# Patient Record
Sex: FEMALE | Race: WHITE | Employment: UNEMPLOYED | URBAN - METROPOLITAN AREA
[De-identification: names, ages, dates, MRNs, and addresses within clinical notes are randomized per-mention and may not be internally consistent; named-entity substitution may affect disease eponyms.]

---

## 2017-02-08 ENCOUNTER — TELEPHONE (OUTPATIENT)
Dept: ENDOCRINOLOGY | Age: 47
End: 2017-02-08

## 2017-02-08 NOTE — TELEPHONE ENCOUNTER
----- Message from Jen Burgos sent at 2/8/2017  2:38 PM EST -----  Regarding: RE: missed appointment  Contact: [phone]  Dr. Dimitri Hayes. Patient accepted the appointment for 02/9/2017 at 11:30am    Thank you. Annelise Leyva  ----- Message -----     From: Triston Ashley MD     Sent: 2/8/2017   2:17 PM       To: Jen Burgos  Subject: RE: missed appointment                           That's fine to put her in either of these spots. ----- Message -----     From: Jen Burgos     Sent: 2/8/2017   2:08 PM       To: Triston Ashley MD  Subject: missed appointment                               2/8/2017      Good afternoon Dr. Dimitri Hayes. Patient is calling in regards to missing  her January 24,2017 appointment due to the passing of her mother. She would like to   reschedule, some one has cancelled for tomorrow at 11:30am and  Friday 02/17/2017 at 2:10pm.  Please advise. Patient contact: [phone]    Thank you.   Annelise Leyva

## 2017-02-14 ENCOUNTER — TELEPHONE (OUTPATIENT)
Dept: ENDOCRINOLOGY | Age: 47
End: 2017-02-14

## 2017-02-14 NOTE — TELEPHONE ENCOUNTER
----- Message from Lennox Citizen sent at 2/14/2017 10:17 AM EST -----  Regarding: RE: Patient Cancelled  Spoke to [place] this morning and rescheduled her for 2/28/17 at 3:30 PM.  (you had an open slot.)   Thank you.      ----- Message -----     From: Vladimir Boggs MD     Sent: 2/9/2017  10:59 AM       To: Lennox Citizen  Subject: RE: Patient Jessica Rivera.  Does she want to reschedule for sometime in the next few weeks. Ok to put her on a Tuesday morning between 8:50-9:30.  ----- Message -----     From: Lennox Citieda     Sent: 2/9/2017  10:53 AM       To: Vladimir Boggs MD  Subject: Patient Brandon Banuelos just called to cancel her appointment. Said she didn't feel like seeing anyone today and she wants to just stay at home in her apartment. ROSIBEL. ............ Laura Bauman

## 2017-02-21 ENCOUNTER — HOSPITAL ENCOUNTER (OUTPATIENT)
Dept: LAB | Age: 47
Discharge: HOME | End: 2017-02-21

## 2017-02-21 PROCEDURE — 83036 HEMOGLOBIN GLYCOSYLATED A1C: CPT | Performed by: NURSE PRACTITIONER

## 2017-02-21 PROCEDURE — 80053 COMPREHEN METABOLIC PANEL: CPT | Performed by: NURSE PRACTITIONER

## 2017-02-21 PROCEDURE — 80061 LIPID PANEL: CPT | Performed by: NURSE PRACTITIONER

## 2017-02-22 LAB
ALBUMIN SERPL BCP-MCNC: 4 G/DL (ref 3.5–5)
ALBUMIN/GLOB SERPL: 1.2 {RATIO} (ref 1.1–2.2)
ALP SERPL-CCNC: 82 U/L (ref 45–117)
ALT SERPL-CCNC: 46 U/L (ref 12–78)
ANION GAP BLD CALC-SCNC: 11 MMOL/L (ref 5–15)
AST SERPL W P-5'-P-CCNC: 76 U/L (ref 15–37)
BILIRUB SERPL-MCNC: 0.7 MG/DL (ref 0.2–1)
BUN SERPL-MCNC: 29 MG/DL (ref 6–20)
BUN/CREAT SERPL: 17 (ref 12–20)
CALCIUM SERPL-MCNC: 9 MG/DL (ref 8.5–10.1)
CHLORIDE SERPL-SCNC: 89 MMOL/L (ref 97–108)
CHOLEST SERPL-MCNC: 154 MG/DL
CO2 SERPL-SCNC: 29 MMOL/L (ref 21–32)
CREAT SERPL-MCNC: 1.66 MG/DL (ref 0.55–1.02)
EST. AVERAGE GLUCOSE BLD GHB EST-MCNC: 223 MG/DL
GLOBULIN SER CALC-MCNC: 3.3 G/DL (ref 2–4)
GLUCOSE SERPL-MCNC: 346 MG/DL (ref 65–100)
HBA1C MFR BLD: 9.4 % (ref 4.2–6.3)
HDLC SERPL-MCNC: 66 MG/DL
HDLC SERPL: 2.3 {RATIO} (ref 0–5)
LDLC SERPL CALC-MCNC: 23.6 MG/DL (ref 0–100)
LIPID PROFILE,FLP: ABNORMAL
POTASSIUM SERPL-SCNC: 5.1 MMOL/L (ref 3.5–5.1)
PROT SERPL-MCNC: 7.3 G/DL (ref 6.4–8.2)
SODIUM SERPL-SCNC: 129 MMOL/L (ref 136–145)
TRIGL SERPL-MCNC: 322 MG/DL (ref ?–150)
VLDLC SERPL CALC-MCNC: 64.4 MG/DL

## 2017-02-28 ENCOUNTER — OFFICE VISIT (OUTPATIENT)
Dept: ENDOCRINOLOGY | Age: 47
End: 2017-02-28

## 2017-02-28 VITALS
HEART RATE: 106 BPM | HEIGHT: 65 IN | DIASTOLIC BLOOD PRESSURE: 85 MMHG | SYSTOLIC BLOOD PRESSURE: 130 MMHG | BODY MASS INDEX: 34.45 KG/M2 | WEIGHT: 206.8 LBS

## 2017-02-28 DIAGNOSIS — E78.5 HYPERLIPIDEMIA LDL GOAL <100: ICD-10-CM

## 2017-02-28 DIAGNOSIS — I10 ESSENTIAL HYPERTENSION, BENIGN: ICD-10-CM

## 2017-02-28 DIAGNOSIS — R79.89 ELEVATED LFTS: ICD-10-CM

## 2017-02-28 NOTE — PROGRESS NOTES
Chief Complaint   Patient presents with    Diabetes     pcp and pharmacy confirmed    Diabetic Foot Exam     due    Other     eye exam is due     History of Present Illness: Cheyanne Johnson is a 40 y.o. female here for follow up of diabetes. Weight down 2 lbs since last visit in 10/16. Currently taking 32 units of lantus in the morning and 6 units of lantus at night for the past month as she was having higher daytime sugars so the higher dose of lantus is helping. Did decrease the evening dose to avoid lows in the morning. May have a reading under 80 in the morning a few times a month. Dosing her novolog 1:8 for carbs at breakfast and lunch and 1:15 for dinner and often will dose after she eats especially after having a recent stomach flu and her stomach has been more unsettled. Has been taking 1:25 for correction for > 150 in the morning and this has worked better to bring her down when she is high. She recently had labs drawn last week that showed her BUN/Cr were elevated and she was called by [facility] and told she needed to see a renal specialist and she was very upset by this. She states she had taken some aleve prior to the lab draw and may have taken a double dose of lasix in the day or 2 before the lab draw as she was having more swelling and also having come off the stomach flu with vomiting and diarrhea, all of these may have contributed to dehydration leading to the abnormal kidney function. She is due to see Dr. Eneida Terrell on Thursday and I told her to have him repeat her labs now that she is feeling better to see if her kidney function has improved. She plans to drink plenty of water today and tomorrow prior to her lab draw to ensure she is not dehydrated. She has been under more stress recently as her mother  of cancer about 3 weeks ago so this has been very hard. She is still going to her counselor every 2 weeks and has found this helpful.   She can no longer get crestor through [facility] now that it's generic but has a 90 day supply of 40 mg tabs at home so I told her to take 1/2 tab daily to use these up over the next 6 months. Current Outpatient Prescriptions   Medication Sig    pantoprazole (PROTONIX) 40 mg tablet Take 40 mg by mouth daily.  venlafaxine-SR (EFFEXOR XR) 150 mg capsule Take  by mouth daily.  gabapentin (NEURONTIN) 300 mg capsule Take 600 mg by mouth three (3) times daily.  furosemide (LASIX) 40 mg tablet Take 1-2 tablets daily as needed for leg swelling    rosuvastatin (CRESTOR) 20 mg tablet Take 20 mg by mouth nightly.  olmesartan-hydrochlorothiazide (BENICAR HCT) 40-12.5 mg per tablet Take 1 Tab by mouth daily.  carvedilol (COREG CR) 10 mg CR capsule Take  by mouth daily (with breakfast).  insulin glargine (LANTUS SOLOSTAR) 100 unit/mL (3 mL) pen by SubCUTAneous route. 32 units before breakfast and 6 units at night    insulin aspart (NOVOLOG FLEXPEN) 100 unit/mL flexpen by SubCUTAneous route. 1:8 for carbs for breakfast and lunch and 1:15 for dinner and 1:50 > 150 for correction--Dose change 11/1/16--updated med list--did not send prescription to the pharmacy    estradiol (ESTRACE) 2 mg tablet Take  by mouth daily.  Insulin Syringe-Needle U-100 0.3 mL 31 x 5/16\" syrg Use up to 4 times daily     No current facility-administered medications for this visit. Allergies   Allergen Reactions    Zocor [Simvastatin] Myalgia    Lisinopril Cough     Review of Systems:  - Eyes: no blurry vision or double vision  - Cardiovascular: no chest pain  - Respiratory: no shortness of breath  - Musculoskeletal: no myalgias  - Neurological: no numbness/tingling in extremities    Physical Examination:  Blood pressure 130/85, pulse (!) 106, height 5' 5\" (1.651 m), weight 206 lb 12.8 oz (93.8 kg).   - General: pleasant, no distress, good eye contact aside from being tearful talking about the death of her mother  - Neck: no carotid bruits  - Cardiovascular: regular, normal rate, nl s1 and s2, no m/r/g,   - Respiratory: clear bilaterally  - Integumentary: no edema,   - Psychiatric: normal mood and affect    Data Reviewed:   Component      Latest Ref Rng & Units 2/21/2017 2/21/2017 2/21/2017          10:35 AM 10:35 AM 10:35 AM   Sodium      136 - 145 mmol/L   129 (L)   Potassium      3.5 - 5.1 mmol/L   5.1   Chloride      97 - 108 mmol/L   89 (L)   CO2      21 - 32 mmol/L   29   Anion gap      5 - 15 mmol/L   11   Glucose      65 - 100 mg/dL   346 (H)   BUN      6 - 20 MG/DL   29 (H)   Creatinine      0.55 - 1.02 MG/DL   1.66 (H)   BUN/Creatinine ratio      12 - 20     17   GFR est AA      >60 ml/min/1.73m2   40 (L)   GFR est non-AA      >60 ml/min/1.73m2   33 (L)   Calcium      8.5 - 10.1 MG/DL   9.0   Bilirubin, total      0.2 - 1.0 MG/DL   0.7   ALT      12 - 78 U/L   46   AST      15 - 37 U/L   76 (H)   Alk. phosphatase      45 - 117 U/L   82   Protein, total      6.4 - 8.2 g/dL   7.3   Albumin      3.5 - 5.0 g/dL   4.0   Globulin      2.0 - 4.0 g/dL   3.3   A-G Ratio      1.1 - 2.2     1.2   Cholesterol, total      <200 MG/DL  154    Triglyceride      <150 MG/DL  322 (H)    HDL Cholesterol      MG/DL  66    LDL, calculated      0 - 100 MG/DL  23.6    VLDL, calculated      MG/DL  64.4    CHOL/HDL Ratio      0 - 5.0    2.3    Hemoglobin A1c, (calculated)      4.2 - 6.3 % 9.4 (H)     Est. 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 As stated in all of my prior notes, she has been unable to work since 8/11/12 due to extreme anxiety leading to widely fluctuating blood sugars. Although previously she had not had any severe end organ damage from her diabetes, she has started to develop worsening neuropathy from her uncontrolled diabetes and this is affecting her fine motor skills in her hands. I still do not think she will be able to return to any full time employment in the future and stand by my decision to support her in pursuit of disability. I will be happy to continue to support the patient with any further appeal of her denial of disability. Overall I think her insulin doses are fine so will keep them the same for now. - cont lantus 32 units in the morning and cont 6 units at night  - cont Novolog 1:8 for carbs for breakfast and lunch and 1:15 for dinner and take 1:50 for correction at lunch and dinner but 1:25 at breakfast > 150  - check bs up to 8 times daily due to fluctuating blood sugars  - foot exam done 2/16  - microalbumin was 69 in 7/10 up to 392 in March 2011 and 621 in May down to 216 in October 2011 and 37.3 in 4/14 and up to 183 in 12/15 and 300 in 9/16  - optho UTD 12/15  - check A1c and cmp at [facility]    2) HTN NOS (401.9): Blood pressure was at goal < 140/90   - cont regimen above     3) Hyperlipidemia (272.4): Given DM, goal LDL is < 100, non-HDL < 130, and TGs < 150. LDL was 146 in January 2010, down to 124 in July and 93 in March 2011 with taking 5 mg of crestor daily. Her crestor was changed to pravastatin by Dr. Babak Joseph because of cost in Feb 2012. LDL 93 in 8/12 but her HDL was high at 138 due to ETOH intake. LDL down to 39 in 11/12 but up to 102 in 3/13. Off pravastatin at this time and previously was on 10 mg daily.  in 1/14. Up to 167 in 4/14 so started lovastatin 20 mg daily but she couldn't afford this.   She has been started on crestor 40 mg daily by [facility] and LDL 64 in 10/14 and 55 in 1/15. Was 37 in 3/16 so dose decreased to 20 mg daily and LDL 23 in 2/17  - cont crestor 20 mg daily (use up 1/2 of 40 mg tabs)  - check lipids at [facility]    4) Elevated LFTs (790.6): I told her previously that her LFTs were elevated likely due to ETOH intake so I advised her to cut back on this and her repeat LFTs were normal in 3/13 and 1/14 and 4/14 and 10/14 and 1/15 and 12/15 and 3/16. AST up to 76 in 2/17 so will follow this. Patient Instructions   1) It's possible your creatinine (kidney test) was elevated due to dehydration from recent stomach flu and also getting over bronchitis and having taken some doses of aleve and also possibly from doubling the lasix prior to the lab draw. Ask Dr. Joe Salter to repeat your test before sending you to a kidney specialist as your levels have been fine in the past.    2) Your Hemoglobin A1c (3 month test of blood sugar) was 9.4% up slightly from 9.1% in 11/16. 3) Use up the 40 mg crestor tabs taking 1/2 tab daily for the next 6 months and then we can determine another plan at that time. 4) Your blood pressure is at goal.        Follow-up Disposition:  Return in about 3 months (around 5/28/2017) for ok to use 9:10 or 9:30 on Tuesday morning.     Copy sent to:  Dr. Nicole Escobar at [facility]

## 2017-02-28 NOTE — PATIENT INSTRUCTIONS
1) It's possible your creatinine (kidney test) was elevated due to dehydration from recent stomach flu and also getting over bronchitis and having taken some doses of aleve and also possibly from doubling the lasix prior to the lab draw. Ask Dr. Sherry Woo to repeat your test before sending you to a kidney specialist as your levels have been fine in the past.    2) Your Hemoglobin A1c (3 month test of blood sugar) was 9.4% up slightly from 9.1% in 11/16. 3) Use up the 40 mg crestor tabs taking 1/2 tab daily for the next 6 months and then we can determine another plan at that time.     4) Your blood pressure is at goal.

## 2017-02-28 NOTE — MR AVS SNAPSHOT
Visit Information Date & Time Provider Department Dept. Phone Encounter #  
 2/28/2017  3:30 PM Reinaldo Pandya, [address] [place] [address] Diabetes and Endocrinology [phone] [id number] Follow-up Instructions Return in about 3 months (around 5/28/2017) for ok to use 9:10 or 9:30 on Tuesday morning. Upcoming Health Maintenance Date Due Pneumococcal 19-64 Medium Risk (1 of 1 - PPSV23) 3/5/1990 DTaP/Tdap/Td series (1 - Tdap) 3/5/1992 INFLUENZA AGE 9 TO ADULT 8/1/2016 EYE EXAM RETINAL OR DILATED Q1 12/9/2016 FOOT EXAM Q1 2/2/2017 HEMOGLOBIN A1C Q6M 8/21/2017 MICROALBUMIN Q1 9/19/2017 LIPID PANEL Q1 2/21/2018 PAP AKA CERVICAL CYTOLOGY 8/15/2019 Allergies as of 2/28/2017  Review Complete On: 2/28/2017 By: Reinaldo Pandya MD  
  
 Severity Noted Reaction Type Reactions Zocor [Simvastatin] Medium 11/20/2009   Intolerance Myalgia Lisinopril  05/26/2011    Cough Current Immunizations  Never Reviewed Name Date Influenza Vaccine Split 10/1/2012 Not reviewed this visit Vitals BP  
  
  
  
  
  
 130/85 Vitals History BMI and BSA Data Body Mass Index Body Surface Area 34.41 kg/m 2 2.07 m 2 Preferred Pharmacy Pharmacy Name Phone [facility] [address] [place] [address], [address] [place], [place] [phone] Your Updated Medication List  
  
   
This list is accurate as of: 2/28/17  4:11 PM.  Always use your most recent med list.  
  
  
  
  
 Stewart Papa HCT 40-12.5 mg per tablet Generic drug:  olmesartan-hydroCHLOROthiazide Take 1 Tab by mouth daily. COREG CR 10 mg CR capsule Generic drug:  carvedilol Take  by mouth daily (with breakfast). EFFEXOR  mg capsule Generic drug:  venlafaxine-SR Take  by mouth daily. estradiol 2 mg tablet Commonly known as:  ESTRACE Take  by mouth daily. furosemide 40 mg tablet Commonly known as:  LASIX Take 1-2 tablets daily as needed for leg swelling  
  
 gabapentin 300 mg capsule Commonly known as:  NEURONTIN Take 600 mg by mouth three (3) times daily. Insulin Syringe-Needle U-100 0.3 mL 31 gauge x 5/16 Syrg Use up to 4 times daily LANTUS SOLOSTAR 100 unit/mL (3 mL) pen Generic drug:  insulin glargine  
by SubCUTAneous route. 32 units before breakfast and 6 units at night NovoLOG Flexpen 100 unit/mL Inpn Generic drug:  insulin aspart  
by SubCUTAneous route. 1:8 for carbs for breakfast and lunch and 1:15 for dinner and 1:50 > 150 for correction--Dose change 11/1/16--updated med list--did not send prescription to the pharmacy PROTONIX 40 mg tablet Generic drug:  pantoprazole Take 40 mg by mouth daily. rosuvastatin 20 mg tablet Commonly known as:  CRESTOR Take 20 mg by mouth nightly. Follow-up Instructions Return in about 3 months (around 5/28/2017) for ok to use 9:10 or 9:30 on Tuesday morning. Patient Instructions 1) It's possible your creatinine (kidney test) was elevated due to dehydration from recent stomach flu and also getting over bronchitis and having taken some doses of aleve and also possibly from doubling the lasix prior to the lab draw. Ask Dr. Clifford Bravo to repeat your test before sending you to a kidney specialist as your levels have been fine in the past. 
 
2) Your Hemoglobin A1c (3 month test of blood sugar) was 9.4% up slightly from 9.1% in 11/16. 3) Use up the 40 mg crestor tabs taking 1/2 tab daily for the next 6 months and then we can determine another plan at that time. 4) Your blood pressure is at goal. 
 
 
 
  
Introducing \[facility]\"" & HEALTH SERVICES! Dear Carole Hackett: 
Thank you for requesting a [employer] account. Our records indicate that you already have an active [employer] account. You can access your account anytime at [email or web address] Did you know that you can access your hospital and ER discharge instructions at any time in Ansible? You can also review all of your test results from your hospital stay or ER visit. Additional Information If you have questions, please visit the Frequently Asked Questions section of the An[employer] website at [email or web address] Remember, Ansible is NOT to be used for urgent needs. For medical emergencies, dial 911. Now available from your iPhone and Android! Please provide this summary of care documentation to your next provider. Your primary care clinician is listed as Landon Medel. If you have any questions after today's visit, please call [phone].

## 2017-03-02 ENCOUNTER — HOSPITAL ENCOUNTER (OUTPATIENT)
Dept: LAB | Age: 47
Discharge: HOME | End: 2017-03-02

## 2017-03-02 LAB
ANION GAP BLD CALC-SCNC: 12 MMOL/L (ref 5–15)
BUN SERPL-MCNC: 19 MG/DL (ref 6–20)
BUN/CREAT SERPL: 12 (ref 12–20)
CALCIUM SERPL-MCNC: 9.2 MG/DL (ref 8.5–10.1)
CHLORIDE SERPL-SCNC: 96 MMOL/L (ref 97–108)
CO2 SERPL-SCNC: 25 MMOL/L (ref 21–32)
CREAT SERPL-MCNC: 1.6 MG/DL (ref 0.55–1.02)
GLUCOSE SERPL-MCNC: 77 MG/DL (ref 65–100)
POTASSIUM SERPL-SCNC: 4.6 MMOL/L (ref 3.5–5.1)
SODIUM SERPL-SCNC: 133 MMOL/L (ref 136–145)

## 2017-03-02 PROCEDURE — 80048 BASIC METABOLIC PNL TOTAL CA: CPT | Performed by: INTERNAL MEDICINE

## 2017-03-13 ENCOUNTER — TELEPHONE (OUTPATIENT)
Dept: ENDOCRINOLOGY | Age: 47
End: 2017-03-13

## 2017-03-13 NOTE — TELEPHONE ENCOUNTER
Patient stated that she had her labs done and she was told that she needs to see a kidney specialist.  She stated that at her last visit with Dr. Rebecca Welch  she was told to call back and let Dr. Rebecca Welch know what her labs showed. She stated that she would like for him to review her labs and give his opinion on what she should do.

## 2017-03-13 NOTE — TELEPHONE ENCOUNTER
----- Message from Mere Alonso sent at 3/13/2017  1:28 PM EDT -----  Regarding: Dr. Max Sebastian  Patient would like a call back at [phone].

## 2017-03-13 NOTE — TELEPHONE ENCOUNTER
Given her creatinine (kidney test) was still high at 1.6 and down only slightly from 1.66, I think it does make sense to see a kidney specialist just to make sure nothing else is going on as I was hopeful her level would have come back down with trying to drink more water but since it's still elevated, for safety, seeing the specialist makes sense at this time.

## 2017-04-06 ENCOUNTER — TELEPHONE (OUTPATIENT)
Dept: ENDOCRINOLOGY | Age: 47
End: 2017-04-06

## 2017-04-06 NOTE — TELEPHONE ENCOUNTER
----- Message from Blanka Coates sent at 4/6/2017  8:32 AM EDT -----  Regarding: Dr. Selene Bruno (P) [phone], advised that pt was admitted into [facility] ICU on 04/05/17 for DKA, pneumonia and other infections. Doctor at hospital stated that she was currently in critical care.

## 2017-04-06 NOTE — TELEPHONE ENCOUNTER
Please call Kye Tavarez and let him know that I appreciate the update and if Jorge Luis Gathers needs anything from me during her hospital stay, please have her or the doctor caring for her call to speak with me.

## 2017-04-12 ENCOUNTER — TELEPHONE (OUTPATIENT)
Dept: ENDOCRINOLOGY | Age: 47
End: 2017-04-12

## 2017-04-12 NOTE — TELEPHONE ENCOUNTER
Claudell Hay called to give an update. He stated that she is still in critical care and still on the ventilator.   (

## 2017-04-25 ENCOUNTER — TELEPHONE (OUTPATIENT)
Dept: ENDOCRINOLOGY | Age: 47
End: 2017-04-25

## 2017-04-25 NOTE — TELEPHONE ENCOUNTER
----- Message from González Rosa MD sent at 4/25/2017  8:53 AM EDT -----  Can you call Michelle Washburn and find out how she is doing?

## 2017-04-25 NOTE — TELEPHONE ENCOUNTER
i spoke with Claudell Hay and he stated that Ms. Arturo Rivera is still in ICU and she is still currently on the ventilator.

## 2017-05-08 ENCOUNTER — TELEPHONE (OUTPATIENT)
Dept: ENDOCRINOLOGY | Age: 47
End: 2017-05-08

## 2017-05-08 NOTE — TELEPHONE ENCOUNTER
----- Message from Tony Jackson sent at 5/8/2017 11:17 AM EDT -----  Regarding: Schedule Question ? Patient called this morning from home (sounding great.)   She already is scheduled to see you on 5/30/17 at 9:10 AM and wants to know if you need to see her sooner than that? Please advise and I will call her. Thank you.

## 2017-05-17 ENCOUNTER — HOSPITAL ENCOUNTER (OUTPATIENT)
Dept: LAB | Age: 47
Discharge: HOME | End: 2017-05-17

## 2017-05-17 LAB
ANION GAP BLD CALC-SCNC: 13 MMOL/L (ref 5–15)
BASOPHILS # BLD AUTO: 0.1 K/UL (ref 0–0.1)
BASOPHILS # BLD: 1 % (ref 0–1)
BUN SERPL-MCNC: 20 MG/DL (ref 6–20)
BUN/CREAT SERPL: 13 (ref 12–20)
CALCIUM SERPL-MCNC: 9.9 MG/DL (ref 8.5–10.1)
CHLORIDE SERPL-SCNC: 92 MMOL/L (ref 97–108)
CO2 SERPL-SCNC: 23 MMOL/L (ref 21–32)
CREAT SERPL-MCNC: 1.49 MG/DL (ref 0.55–1.02)
EOSINOPHIL # BLD: 0.6 K/UL (ref 0–0.4)
EOSINOPHIL NFR BLD: 4 % (ref 0–7)
ERYTHROCYTE [DISTWIDTH] IN BLOOD BY AUTOMATED COUNT: 14.3 % (ref 11.5–14.5)
GLUCOSE SERPL-MCNC: 76 MG/DL (ref 65–100)
HCT VFR BLD AUTO: 35.4 % (ref 35–47)
HGB BLD-MCNC: 11.4 G/DL (ref 11.5–16)
LYMPHOCYTES # BLD AUTO: 35 % (ref 12–49)
LYMPHOCYTES # BLD: 5.2 K/UL (ref 0.8–3.5)
MCH RBC QN AUTO: 31.6 PG (ref 26–34)
MCHC RBC AUTO-ENTMCNC: 32.2 G/DL (ref 30–36.5)
MCV RBC AUTO: 98.1 FL (ref 80–99)
MONOCYTES # BLD: 0.9 K/UL (ref 0–1)
MONOCYTES NFR BLD AUTO: 6 % (ref 5–13)
NEUTS SEG # BLD: 7.8 K/UL (ref 1.8–8)
NEUTS SEG NFR BLD AUTO: 54 % (ref 32–75)
PLATELET # BLD AUTO: 591 K/UL (ref 150–400)
POTASSIUM SERPL-SCNC: 4.9 MMOL/L (ref 3.5–5.1)
RBC # BLD AUTO: 3.61 M/UL (ref 3.8–5.2)
SODIUM SERPL-SCNC: 128 MMOL/L (ref 136–145)
WBC # BLD AUTO: 14.6 K/UL (ref 3.6–11)

## 2017-05-17 PROCEDURE — 80048 BASIC METABOLIC PNL TOTAL CA: CPT | Performed by: INTERNAL MEDICINE

## 2017-05-17 PROCEDURE — 85025 COMPLETE CBC W/AUTO DIFF WBC: CPT | Performed by: INTERNAL MEDICINE

## 2017-05-22 ENCOUNTER — HOSPITAL ENCOUNTER (OUTPATIENT)
Dept: LAB | Age: 47
Discharge: HOME | End: 2017-05-22

## 2017-05-22 LAB
ALBUMIN SERPL BCP-MCNC: 3.7 G/DL (ref 3.5–5)
ALBUMIN/GLOB SERPL: 1.1 {RATIO} (ref 1.1–2.2)
ALP SERPL-CCNC: 87 U/L (ref 45–117)
ALT SERPL-CCNC: 20 U/L (ref 12–78)
ANION GAP BLD CALC-SCNC: 5 MMOL/L (ref 5–15)
AST SERPL W P-5'-P-CCNC: 15 U/L (ref 15–37)
BASOPHILS # BLD AUTO: 0 K/UL (ref 0–0.1)
BASOPHILS # BLD: 0 % (ref 0–1)
BILIRUB SERPL-MCNC: 0.2 MG/DL (ref 0.2–1)
BUN SERPL-MCNC: 10 MG/DL (ref 6–20)
BUN/CREAT SERPL: 11 (ref 12–20)
CALCIUM SERPL-MCNC: 9.6 MG/DL (ref 8.5–10.1)
CHLORIDE SERPL-SCNC: 100 MMOL/L (ref 97–108)
CO2 SERPL-SCNC: 26 MMOL/L (ref 21–32)
CREAT SERPL-MCNC: 0.89 MG/DL (ref 0.55–1.02)
EOSINOPHIL # BLD: 0.5 K/UL (ref 0–0.4)
EOSINOPHIL NFR BLD: 4 % (ref 0–7)
ERYTHROCYTE [DISTWIDTH] IN BLOOD BY AUTOMATED COUNT: 13.9 % (ref 11.5–14.5)
GLOBULIN SER CALC-MCNC: 3.4 G/DL (ref 2–4)
GLUCOSE SERPL-MCNC: 154 MG/DL (ref 65–100)
HCT VFR BLD AUTO: 32.5 % (ref 35–47)
HGB BLD-MCNC: 10.5 G/DL (ref 11.5–16)
LYMPHOCYTES # BLD AUTO: 42 % (ref 12–49)
LYMPHOCYTES # BLD: 5 K/UL (ref 0.8–3.5)
MCH RBC QN AUTO: 31.7 PG (ref 26–34)
MCHC RBC AUTO-ENTMCNC: 32.3 G/DL (ref 30–36.5)
MCV RBC AUTO: 98.2 FL (ref 80–99)
MONOCYTES # BLD: 0.9 K/UL (ref 0–1)
MONOCYTES NFR BLD AUTO: 8 % (ref 5–13)
NEUTS SEG # BLD: 5.6 K/UL (ref 1.8–8)
NEUTS SEG NFR BLD AUTO: 46 % (ref 32–75)
PLATELET # BLD AUTO: 508 K/UL (ref 150–400)
POTASSIUM SERPL-SCNC: 5 MMOL/L (ref 3.5–5.1)
PROT SERPL-MCNC: 7.1 G/DL (ref 6.4–8.2)
RBC # BLD AUTO: 3.31 M/UL (ref 3.8–5.2)
SODIUM SERPL-SCNC: 131 MMOL/L (ref 136–145)
WBC # BLD AUTO: 11.9 K/UL (ref 3.6–11)

## 2017-05-22 PROCEDURE — 80053 COMPREHEN METABOLIC PANEL: CPT | Performed by: NURSE PRACTITIONER

## 2017-05-22 PROCEDURE — 85025 COMPLETE CBC W/AUTO DIFF WBC: CPT | Performed by: NURSE PRACTITIONER

## 2017-05-24 ENCOUNTER — HOSPITAL ENCOUNTER (OUTPATIENT)
Dept: LAB | Age: 47
Discharge: HOME | End: 2017-05-24

## 2017-05-24 PROCEDURE — 87086 URINE CULTURE/COLONY COUNT: CPT | Performed by: NURSE PRACTITIONER

## 2017-05-26 LAB
BACTERIA SPEC CULT: NORMAL
CC UR VC: NORMAL
SERVICE CMNT-IMP: NORMAL

## 2017-05-30 ENCOUNTER — OFFICE VISIT (OUTPATIENT)
Dept: ENDOCRINOLOGY | Age: 47
End: 2017-05-30

## 2017-05-30 VITALS
WEIGHT: 189.1 LBS | HEIGHT: 65 IN | DIASTOLIC BLOOD PRESSURE: 82 MMHG | HEART RATE: 88 BPM | BODY MASS INDEX: 31.5 KG/M2 | SYSTOLIC BLOOD PRESSURE: 150 MMHG

## 2017-05-30 DIAGNOSIS — R79.89 ELEVATED LFTS: ICD-10-CM

## 2017-05-30 DIAGNOSIS — E78.5 HYPERLIPIDEMIA LDL GOAL <100: ICD-10-CM

## 2017-05-30 DIAGNOSIS — I10 ESSENTIAL HYPERTENSION, BENIGN: ICD-10-CM

## 2017-05-30 RX ORDER — METOCLOPRAMIDE 5 MG/1
5 TABLET ORAL
COMMUNITY
End: 2017-09-28

## 2017-05-30 NOTE — PATIENT INSTRUCTIONS
1) Plan on staying at 28 units of lantus and as long as your sugars are staying between  without eating, this is a good dose but if you are starting to rise over 150 without eating, then go up by 2 units every 3-4 days as needed to find the dose that keeps you between  without food. If you are staying over 140 consistently in the morning, then add back 4 units at night. 2) Your blood pressure was up today but you are going to have the PEG tube replaced today. Start monitoring blood pressure about 2-3 times per week at alternating times either in the morning or evening after resting for 5 minutes and sitting upright in a chair with your arm at heart level. Please let Dr. Dmitri Parrish if you are having readings over 140 on the top number or 90 on the bottom number just on the coreg alone as we may need to add back a low dose of benicar.

## 2017-05-30 NOTE — MR AVS SNAPSHOT
Visit Information Date & Time Provider Department Dept. Phone Encounter #  
 5/30/2017  9:10 AM Savanah Wright, [address] [place] [address] Diabetes and Endocrinology [phone] Follow-up Instructions Return for 3 months on a Tuesday morning at 9:10 or 9:30. Upcoming Health Maintenance Date Due Pneumococcal 19-64 Medium Risk (1 of 1 - PPSV23) 3/5/1990 DTaP/Tdap/Td series (1 - Tdap) 3/5/1992 EYE EXAM RETINAL OR DILATED Q1 12/9/2016 FOOT EXAM Q1 2/2/2017 INFLUENZA AGE 9 TO ADULT 8/1/2017 HEMOGLOBIN A1C Q6M 8/21/2017 MICROALBUMIN Q1 9/19/2017 LIPID PANEL Q1 2/21/2018 PAP AKA CERVICAL CYTOLOGY 8/15/2019 Allergies as of 5/30/2017  Review Complete On: 5/30/2017 By: Savanah Wright MD  
  
 Severity Noted Reaction Type Reactions Zocor [Simvastatin] Medium 11/20/2009   Intolerance Myalgia Lisinopril  05/26/2011    Cough Current Immunizations  Never Reviewed Name Date Influenza Vaccine Split 10/1/2012 Not reviewed this visit Vitals BP Pulse Height(growth percentile) Weight(growth percentile) BMI OB Status 150/82 88 5' 5\" (1.651 m) 189 lb 1.6 oz (85.8 kg) 31.47 kg/m2 Having regular periods Smoking Status Current Every Day Smoker Vitals History BMI and BSA Data Body Mass Index Body Surface Area  
 31.47 kg/m 2 1.98 m 2 Preferred Pharmacy Pharmacy Name Phone [address], [address] [place] [address] [phone] Your Updated Medication List  
  
   
This list is accurate as of: 5/30/17  9:58 AM.  Always use your most recent med list.  
  
  
  
  
 COREG CR 10 mg CR capsule Generic drug:  carvedilol Take  by mouth daily (with breakfast). EFFEXOR  mg capsule Generic drug:  venlafaxine-SR Take  by mouth daily. estradiol 2 mg tablet Commonly known as:  ESTRACE Take  by mouth daily. gabapentin 300 mg capsule Commonly known as:  NEURONTIN Take 600 mg by mouth three (3) times daily. Insulin Syringe-Needle U-100 0.3 mL 31 gauge x 5/16 Syrg Use up to 4 times daily LANTUS SOLOSTAR 100 unit/mL (3 mL) Inpn Generic drug:  insulin glargine  
by SubCUTAneous route. 28 units before breakfast and occ 4 units at night NovoLOG Flexpen 100 unit/mL Inpn Generic drug:  insulin aspart  
by SubCUTAneous route. 1:15 for carbs and 1:50 > 150 for correction--Dose change 5/30/17--updated med list--did not send prescription to the pharmacy PROTONIX 40 mg tablet Generic drug:  pantoprazole Take 40 mg by mouth daily. REGLAN 5 mg tablet Generic drug:  metoclopramide HCl Take 5 mg by mouth Before breakfast, lunch, and dinner. rosuvastatin 20 mg tablet Commonly known as:  CRESTOR Take 20 mg by mouth nightly. Follow-up Instructions Return for 3 months on a Tuesday morning at 9:10 or 9:30. Patient Instructions 1) Plan on staying at 28 units of lantus and as long as your sugars are staying between  without eating, this is a good dose but if you are starting to rise over 150 without eating, then go up by 2 units every 3-4 days as needed to find the dose that keeps you between  without food. If you are staying over 140 consistently in the morning, then add back 4 units at night. 2) Your blood pressure was up today but you are going to have the PEG tube replaced today. Start monitoring blood pressure about 2-3 times per week at alternating times either in the morning or evening after resting for 5 minutes and sitting upright in a chair with your arm at heart level. Please let Dr. Milagros Hebert if you are having readings over 140 on the top number or 90 on the bottom number just on the coreg alone as we may need to add back a low dose of benicar. Introducing [facility] & HEALTH SERVICES! Dear Buffy Hurtado: Thank you for requesting a [email or web address] account. Our records indicate that you already have an active [email or web address] account. You can access your account anytime at [email or web address] Did you know that you can access your hospital and ER discharge instructions at any time in [email or web address]? You can also review all of your test results from your hospital stay or ER visit. Additional Information If you have questions, please visit the Frequently Asked Questions section of the [email or web address] website at [email or web address] Remember, [email or web address] is NOT to be used for urgent needs. For medical emergencies, dial 911. Now available from your iPhone and Android! Please provide this summary of care documentation to your next provider. Your primary care clinician is listed as Jef Foster. If you have any questions after today's visit, please call [phone].

## 2017-05-30 NOTE — PROGRESS NOTES
Chief Complaint   Patient presents with    Diabetes     pcp and pharmacy confirmed    Other     consnet form signed to obtain eye report     History of Present Illness: Alley Carroll is a 56 y.o. female here for follow up of diabetes. Weight down 17 lbs since last visit in 2/17. She was admitted to [facility] in the ICU on 4/5/17 for DKA and was very ill for a month and had a PEG tube placed and a trach placed but 2 days after the trach was placed, was able to have this removed and was discharged on 5/4/17. Still has the PEG tube in place but plan is to have this removed today by Dr. Navya Ramos. Was told she had a pocket of fluid in her lungs that became infected and was treated with antibiotics. BP became very low and was taken off lasix and the benicar/hctz. Her BP has been controlled on the coreg alone since discharge but was up today as she is nervous about having the PEG tube removed. Had decreased her lantus down to 20 units in the morning but her sugars started rising and 3 days ago went up to 28 units in the morning only. Occ will take 4 units of lantus at night if her sugar was over 200 in the morning. Has been dosing novolog 1:15 for carbs and sugars are mostly staying under 150. Has still been taking 1/2 tab of crestor daily. Will be following up with a lung and kidney doctor. Has also had more trouble with vaginal yeast infection and this if finally starting to resolve with fluconazole. Has had more trouble with memory after the hospital stay and was told some of this may be permanent due to her prolonged ICU course. Current Outpatient Prescriptions   Medication Sig    metoclopramide HCl (REGLAN) 5 mg tablet Take 5 mg by mouth Before breakfast, lunch, and dinner.  pantoprazole (PROTONIX) 40 mg tablet Take 40 mg by mouth daily.  venlafaxine-SR (EFFEXOR XR) 150 mg capsule Take  by mouth daily.     gabapentin (NEURONTIN) 300 mg capsule Take 600 mg by mouth three (3) times daily.    rosuvastatin (CRESTOR) 20 mg tablet Take 20 mg by mouth nightly.  carvedilol (COREG CR) 10 mg CR capsule Take  by mouth daily (with breakfast).  insulin glargine (LANTUS SOLOSTAR) 100 unit/mL (3 mL) pen by SubCUTAneous route. 28 units before breakfast and occ 4 units at night    insulin aspart (NOVOLOG FLEXPEN) 100 unit/mL flexpen by SubCUTAneous route. 1:15 for carbs and 1:50 > 150 for correction--Dose change 5/30/17--updated med list--did not send prescription to the pharmacy    estradiol (ESTRACE) 2 mg tablet Take  by mouth daily.  Insulin Syringe-Needle U-100 0.3 mL 31 x 5/16\" syrg Use up to 4 times daily     No current facility-administered medications for this visit. Allergies   Allergen Reactions    Zocor [Simvastatin] Myalgia    Lisinopril Cough     Review of Systems:  - Eyes: no blurry vision or double vision  - Cardiovascular: no chest pain  - Respiratory: no shortness of breath  - Musculoskeletal: no myalgias  - Neurological: no numbness/tingling in extremities    Physical Examination:  Blood pressure 150/82, pulse 88, height 5' 5\" (1.651 m), weight 189 lb 1.6 oz (85.8 kg).   - General: pleasant, no distress, good eye contact   - Neck: no carotid bruits  - Cardiovascular: regular, normal rate, nl s1 and s2, no m/r/g,   - Respiratory: clear bilaterally  - Integumentary: no edema,   - Psychiatric: normal mood and affect    Data Reviewed:   Component      Latest Ref Rng & Units 5/22/2017 5/17/2017           1:59 PM 10:09 PM   Sodium      136 - 145 mmol/L 131 (L) 128 (L)   Potassium      3.5 - 5.1 mmol/L 5.0 4.9   Chloride      97 - 108 mmol/L 100 92 (L)   CO2      21 - 32 mmol/L 26 23   Anion gap      5 - 15 mmol/L 5 13   Glucose      65 - 100 mg/dL 154 (H) 76   BUN      6 - 20 MG/DL 10 20   Creatinine      0.55 - 1.02 MG/DL 0.89 1.49 (H)   BUN/Creatinine ratio      12 - 20   11 (L) 13   GFR est AA      >60 ml/min/1.73m2 >60 46 (L)   GFR est non-AA      >60 ml/min/1.73m2 >60 38 (L)   Calcium      8.5 - 10.1 MG/DL 9.6 9.9   Bilirubin, total      0.2 - 1.0 MG/DL 0.2    ALT      12 - 78 U/L 20    AST      15 - 37 U/L 15    Alk. 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 As stated in all of my prior notes, she has been unable to work since 8/11/12 due to extreme anxiety leading to widely fluctuating blood sugars. Although previously she had not had any severe end organ damage from her diabetes, she has started to develop worsening neuropathy from her uncontrolled diabetes and this is affecting her fine motor skills in her hands. I still do not think she will be able to return to any full time employment in the future and stand by my decision to support her in pursuit of disability. I will be happy to continue to support the patient with any further appeal of her denial of disability. Overall I think her insulin doses are fine so will keep them the same for now but gave a titration plan as below.   - cont lantus 28 units in the morning   - cont Novolog 1:15 for carbs and take 1:50 for correction at lunch and dinner but 1:25 at breakfast > 150  - check bs up to 8 times daily due to fluctuating blood sugars  - foot exam done 2/16  - microalbumin was 69 in 7/10 up to 392 in March 2011 and 621 in May down to 216 in October 2011 and 37.3 in 4/14 and up to 183 in 12/15 and 300 in 9/16  - optho UTD 12/15  - check A1c and cmp at [facility]    2) HTN NOS (401.9): Blood pressure was above goal < 140/90 but was anxious about PEG tube removal.  Currently off lasix and benicar/hctz  - cont regimen above     3) Hyperlipidemia (272.4): Given DM, goal LDL is < 100, non-HDL < 130, and TGs < 150. LDL was 146 in January 2010, down to 124 in July and 93 in March 2011 with taking 5 mg of crestor daily. Her crestor was changed to pravastatin by Dr. Michael Chowdary because of cost in Feb 2012. LDL 93 in 8/12 but her HDL was high at 138 due to ETOH intake. LDL down to 39 in 11/12 but up to 102 in 3/13. Off pravastatin at this time and previously was on 10 mg daily.  in 1/14. Up to 167 in 4/14 so started lovastatin 20 mg daily but she couldn't afford this. She has been started on crestor 40 mg daily by [facility] and LDL 64 in 10/14 and 55 in 1/15. Was 37 in 3/16 so dose decreased to 20 mg daily and LDL 23 in 2/17  - cont crestor 20 mg daily (use up 1/2 of 40 mg tabs)  - check lipids at [facility]    4) Elevated LFTs (790.6): I told her previously that her LFTs were elevated likely due to ETOH intake so I advised her to cut back on this and her repeat LFTs were normal in 3/13 and 1/14 and 4/14 and 10/14 and 1/15 and 12/15 and 3/16. AST up to 76 in 2/17 but back to normal in 5/17 so will follow this. Patient Instructions   1) Plan on staying at 28 units of lantus and as long as your sugars are staying between  without eating, this is a good dose but if you are starting to rise over 150 without eating, then go up by 2 units every 3-4 days as needed to find the dose that keeps you between  without food. If you are staying over 140 consistently in the morning, then add back 4 units at night. 2) Your blood pressure was up today but you are going to have the PEG tube replaced today.   Start monitoring blood pressure about 2-3 times per week at alternating times either in the morning or evening after resting for 5 minutes and sitting upright in a chair with your arm at heart level. Please let Dr. Taina Griffin if you are having readings over 140 on the top number or 90 on the bottom number just on the coreg alone as we may need to add back a low dose of benicar. Follow-up Disposition:  Return for 3 months on a Tuesday morning at 9:10 or 9:30.     Copy sent to:  Dr. Clemente Rajan at [facility]  Dr. Nell Kitchen

## 2017-06-12 ENCOUNTER — HOSPITAL ENCOUNTER (OUTPATIENT)
Dept: LAB | Age: 47
Discharge: HOME | End: 2017-06-12

## 2017-06-12 PROCEDURE — 80048 BASIC METABOLIC PNL TOTAL CA: CPT | Performed by: INTERNAL MEDICINE

## 2017-06-13 LAB
ANION GAP BLD CALC-SCNC: 9 MMOL/L (ref 5–15)
BUN SERPL-MCNC: 7 MG/DL (ref 6–20)
BUN/CREAT SERPL: 7 (ref 12–20)
CALCIUM SERPL-MCNC: 8.8 MG/DL (ref 8.5–10.1)
CHLORIDE SERPL-SCNC: 89 MMOL/L (ref 97–108)
CO2 SERPL-SCNC: 28 MMOL/L (ref 21–32)
CREAT SERPL-MCNC: 0.97 MG/DL (ref 0.55–1.02)
GLUCOSE SERPL-MCNC: 369 MG/DL (ref 65–100)
POTASSIUM SERPL-SCNC: 5.3 MMOL/L (ref 3.5–5.1)
SODIUM SERPL-SCNC: 126 MMOL/L (ref 136–145)

## 2017-06-14 ENCOUNTER — TELEPHONE (OUTPATIENT)
Dept: ENDOCRINOLOGY | Age: 47
End: 2017-06-14

## 2017-06-14 NOTE — TELEPHONE ENCOUNTER
Please call her and ask if this letter is to be addressed to the same person as before and if she needs this faxed or if she plans to pick this up.

## 2017-06-14 NOTE — TELEPHONE ENCOUNTER
I received a call on Rekha's desk phone from patient. She stated she needs a letter written to get her food stamps back. She stated Dr. Tomasa Lu writes this letter every 6 months for her. She needs this written ASAP because she is out of food stamps. She can be reached at [phone].

## 2017-06-14 NOTE — TELEPHONE ENCOUNTER
Patient stated the letter goes to the same plance. Patient stated that  she will  the letter and she would like it to be to the attention of :    Mr. Ap Negron

## 2017-07-14 RX ORDER — FUROSEMIDE 20 MG/1
TABLET ORAL DAILY
Status: ON HOLD | COMMUNITY
End: 2017-11-26

## 2017-07-14 RX ORDER — ATORVASTATIN CALCIUM 80 MG/1
40 TABLET, FILM COATED ORAL DAILY
COMMUNITY
End: 2017-09-28 | Stop reason: SDUPTHER

## 2017-08-07 NOTE — TELEPHONE ENCOUNTER
Patient is requesting a refill on her test strips and she also needs a new meter due to hers breaking. She uses [email or web address] [phone].

## 2017-08-08 RX ORDER — BLOOD SUGAR DIAGNOSTIC
STRIP MISCELLANEOUS
Qty: 250 STRIP | Refills: 11 | Status: SHIPPED | OUTPATIENT
Start: 2017-08-08 | End: 2017-11-20

## 2017-08-08 RX ORDER — BLOOD-GLUCOSE METER
EACH MISCELLANEOUS
Qty: 1 EACH | Refills: 0 | Status: SHIPPED | OUTPATIENT
Start: 2017-08-08 | End: 2017-11-20

## 2017-08-08 NOTE — TELEPHONE ENCOUNTER
Pt now has medicaid. She stated that she was told that medicaid will cover  Accu chek.   Patient was informed that it will be sent to [facility] per her request.

## 2017-09-08 RX ORDER — CARVEDILOL PHOSPHATE 10 MG/1
10 CAPSULE, EXTENDED RELEASE ORAL
Qty: 30 CAP | Refills: 11 | Status: SHIPPED | OUTPATIENT
Start: 2017-09-08 | End: 2017-09-28 | Stop reason: ALTCHOICE

## 2017-09-28 ENCOUNTER — OFFICE VISIT (OUTPATIENT)
Dept: ENDOCRINOLOGY | Age: 47
End: 2017-09-28

## 2017-09-28 VITALS
DIASTOLIC BLOOD PRESSURE: 96 MMHG | HEIGHT: 65 IN | BODY MASS INDEX: 30.17 KG/M2 | HEART RATE: 110 BPM | WEIGHT: 181.1 LBS | SYSTOLIC BLOOD PRESSURE: 167 MMHG

## 2017-09-28 DIAGNOSIS — R79.89 ELEVATED LFTS: ICD-10-CM

## 2017-09-28 DIAGNOSIS — E78.5 HYPERLIPIDEMIA LDL GOAL <100: ICD-10-CM

## 2017-09-28 DIAGNOSIS — I10 ESSENTIAL HYPERTENSION, BENIGN: ICD-10-CM

## 2017-09-28 RX ORDER — ATORVASTATIN CALCIUM 40 MG/1
40 TABLET, FILM COATED ORAL DAILY
Qty: 30 TAB | Refills: 11 | Status: SHIPPED | OUTPATIENT
Start: 2017-09-28 | End: 2017-11-26

## 2017-09-28 RX ORDER — CARVEDILOL 12.5 MG/1
12.5 TABLET ORAL 2 TIMES DAILY WITH MEALS
Qty: 60 TAB | Refills: 11 | Status: SHIPPED | OUTPATIENT
Start: 2017-09-28 | End: 2017-11-02 | Stop reason: SDUPTHER

## 2017-09-28 RX ORDER — INSULIN GLARGINE 100 [IU]/ML
INJECTION, SOLUTION SUBCUTANEOUS
Qty: 15 ML | Refills: 11 | Status: ON HOLD | OUTPATIENT
Start: 2017-09-28 | End: 2017-11-26

## 2017-09-28 RX ORDER — GABAPENTIN 300 MG/1
600 CAPSULE ORAL 3 TIMES DAILY
Qty: 180 CAP | Refills: 11 | Status: SHIPPED | OUTPATIENT
Start: 2017-09-28 | End: 2017-11-26

## 2017-09-28 RX ORDER — INSULIN ASPART 100 [IU]/ML
INJECTION, SOLUTION INTRAVENOUS; SUBCUTANEOUS
Qty: 15 ML | Refills: 11 | Status: SHIPPED | OUTPATIENT
Start: 2017-09-28 | End: 2017-12-12 | Stop reason: CLARIF

## 2017-09-28 NOTE — PATIENT INSTRUCTIONS
1) We will use up the extended release coreg by taking one tab with breakfast and dinner and then change to immediate release coreg 12.5 mg twice daily. 2) Decrease the lantus to 18 units in the morning and see if this keeps your morning sugars between . If still dropping overnight more than 2 times in a week, then further decrease to 16 units in the morning. 3) Stay on the same novolog scale for now. 4) Use up the 1/2 tab of 80 mg of lipitor and I sent 40 mg tabs to the pharmacy. 5) I will send you a message through [employer] with your lab results.

## 2017-09-28 NOTE — PROGRESS NOTES
Chief Complaint   Patient presents with    Diabetes     pcp and pharmacy confirmed    Diabetic Foot Exam     due    Other     consent form signed to obtain eye report     History of Present Illness: Alfredo Garcia is a 56 y.o. female here for follow up of diabetes. Weight down 8 lbs since last visit in 5/17. Did have her PEG tube removed in May and hasn't need to use the reglan. Was approved for Medicaid in August and has been using accu-chek for strips. Has been taking 20 units of lantus every morning and dosing novolog as below. Had been taking 28 units of lantus but was having low sugars and has cut all the way down to 20 units as of a week ago but despite this still has evidence of sugars dropping by  points overnight without any novolog and often has been eating a small meal at bedtime to prevent low sugars. Her insurance will not be covering the coreg CR so we'll switch to IR coreg. Will be going back to Dr. Wendy Chung for PCP now that she has insurance. Current Outpatient Prescriptions   Medication Sig    carvedilol (COREG CR) 10 mg CR capsule Take 1 Cap by mouth daily (with breakfast).  ACCU-CHEK NORMA PLUS METER misc Use as directed to test up to 8 times per day. Dx E10.65    ACCU-CHEK NORMA PLUS TEST STRP strip Use as directed to test up to 8 times per day. Dx E10.65    atorvastatin (LIPITOR) 80 mg tablet Take 40 mg by mouth daily.  furosemide (LASIX) 20 mg tablet Take  by mouth daily.  pantoprazole (PROTONIX) 40 mg tablet Take 40 mg by mouth daily.  venlafaxine-SR (EFFEXOR XR) 150 mg capsule Take  by mouth daily.  gabapentin (NEURONTIN) 300 mg capsule Take 600 mg by mouth three (3) times daily.  insulin glargine (LANTUS SOLOSTAR) 100 unit/mL (3 mL) pen by SubCUTAneous route. 20 units before breakfast and occ 4 units at night    insulin aspart (NOVOLOG FLEXPEN) 100 unit/mL flexpen by SubCUTAneous route.  1:15 for carbs and 1:50 > 150 for correction--Dose change 5/30/17--updated med list--did not send prescription to the pharmacy    estradiol (ESTRACE) 2 mg tablet Take  by mouth daily.  Insulin Syringe-Needle U-100 0.3 mL 31 x 5/16\" syrg Use up to 4 times daily     No current facility-administered medications for this visit. Allergies   Allergen Reactions    Zocor [Simvastatin] Myalgia    Lisinopril Cough     Review of Systems:  - Eyes: no blurry vision or double vision  - Cardiovascular: no chest pain  - Respiratory: no shortness of breath  - Musculoskeletal: no myalgias  - Neurological: no numbness/tingling in extremities    Physical Examination:  Blood pressure (!) 167/96, pulse (!) 110, height 5' 5\" (1.651 m), weight 181 lb 1.6 oz (82.1 kg). - General: pleasant, no distress, good eye contact   - Neck: no carotid bruits  - Cardiovascular: regular, normal rate, nl s1 and s2, no m/r/g,   - Respiratory: clear bilaterally  - Integumentary: no edema,   - Psychiatric: normal mood and affect         Diabetic foot exam performed by Micah Akhtar MD     Measurement  Response Nurse Comment Physician Comment   Monofilament  R - reduced sensation with micro filament  L - reduced sensation with micro filament     Pulse DP R - 2+ (normal)  L - 2+ (normal)     Pulse TP R - normal  L - decreased     Structural deformity R - None  L - None     Skin Integrity / Deformity R - Mild - callus  L - Mild - callus        Reviewed by:                 Data Reviewed:   - none new for review    Assessment/Plan:   1) 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 As stated in all of my prior notes, she has been unable to work since 8/11/12 due to extreme anxiety leading to widely fluctuating blood sugars. Although previously she had not had any severe end organ damage from her diabetes, she has started to develop worsening neuropathy from her uncontrolled diabetes and this is affecting her fine motor skills in her hands. I still do not think she will be able to return to any full time employment in the future and stand by my decision to support her in pursuit of disability. I will be happy to continue to support the patient with any further appeal of her denial of disability. Overall I will slightly decrease her lantus to avoid overnight hypoglycemia. - decrease lantus to 18 units in the morning   - cont Novolog 1:15 for carbs and take 1:50 for correction at lunch and dinner but 1:25 at breakfast > 150  - check bs up to 8 times daily due to fluctuating blood sugars  - foot exam done 9/17  - microalbumin was 69 in 7/10 up to 392 in March 2011 and 621 in May down to 216 in October 2011 and 37.3 in 4/14 and up to 183 in 12/15 and 300 in 9/16  - optho UTD 12/15  - check A1c and cmp and microalbumin today    2) HTN NOS (401.9): Blood pressure was above goal < 140/90 so will increase her coreg and change to IR per insurance. Currently off lasix and benicar/hctz  - change to coreg 12.5 mg bid     3) Hyperlipidemia (272.4): Given DM, goal LDL is < 100, non-HDL < 130, and TGs < 150. LDL was 146 in January 2010, down to 124 in July and 93 in March 2011 with taking 5 mg of crestor daily. Her crestor was changed to pravastatin by Dr. Nash Linda because of cost in Feb 2012. LDL 93 in 8/12 but her HDL was high at 138 due to ETOH intake. LDL down to 39 in 11/12 but up to 102 in 3/13. Off pravastatin at this time and previously was on 10 mg daily.  in 1/14.   Up to 167 in 4/14 so started lovastatin 20 mg daily but she couldn't afford this. She has been started on crestor 40 mg daily by [facility] and LDL 64 in 10/14 and 55 in 1/15. Was 37 in 3/16 so dose decreased to 20 mg daily and LDL 23 in 2/17. Was changed to lipitor 1/2 of 80 mg daily when clinic couldn't get crestor any longer  - change to lipitor 40 mg daily  - check lipids today    4) Elevated LFTs (790.6): I told her previously that her LFTs were elevated likely due to ETOH intake so I advised her to cut back on this and her repeat LFTs were normal in 3/13 and 1/14 and 4/14 and 10/14 and 1/15 and 12/15 and 3/16. AST up to 76 in 2/17 but back to normal in 5/17 so will follow this. Patient Instructions   1) We will use up the extended release coreg by taking one tab with breakfast and dinner and then change to immediate release coreg 12.5 mg twice daily. 2) Decrease the lantus to 18 units in the morning and see if this keeps your morning sugars between . If still dropping overnight more than 2 times in a week, then further decrease to 16 units in the morning. 3) Stay on the same novolog scale for now. 4) Use up the 1/2 tab of 80 mg of lipitor and I sent 40 mg tabs to the pharmacy. 5) I will send you a message through [employer] with your lab results. Follow-up Disposition:  Return in about 4 months (around 1/28/2018) for ok to use 12:10.     Copy sent to:  Dr. Lozano Setting  Dr. Aracely Flores    Lab follow up: 10/3/17  Component      Latest Ref Rng & Units 9/28/2017 9/28/2017 9/28/2017 9/28/2017           2:10 PM  2:10 PM  2:10 PM  2:10 PM   Glucose      65 - 99 mg/dL 402 (H)      BUN      6 - 24 mg/dL 10      Creatinine      0.57 - 1.00 mg/dL 0.87      GFR est non-AA      >59 mL/min/1.73 80      GFR est AA      >59 mL/min/1.73 92      BUN/Creatinine ratio      9 - 23 11      Sodium      134 - 144 mmol/L 133 (L)      Potassium      3.5 - 5.2 mmol/L 4.8      Chloride      96 - 106 mmol/L 91 (L)      CO2      18 - 29 mmol/L 21 Calcium      8.7 - 10.2 mg/dL 8.9      Protein, total      6.0 - 8.5 g/dL 6.6      Albumin      3.5 - 5.5 g/dL 4.0      GLOBULIN, TOTAL      1.5 - 4.5 g/dL 2.6      A-G Ratio      1.2 - 2.2 1.5      Bilirubin, total      0.0 - 1.2 mg/dL 0.3      Alk. phosphatase      39 - 117 IU/L 72      AST      0 - 40 IU/L 27      ALT (SGPT)      0 - 32 IU/L 17      Cholesterol, total      100 - 199 mg/dL  133     Triglyceride      0 - 149 mg/dL  122     HDL Cholesterol      >39 mg/dL  72     VLDL, calculated      5 - 40 mg/dL  24     LDL, calculated      0 - 99 mg/dL  37     Creatinine, urine      mg/dL   CANCELED    Microalbumin, urine         CANCELED    Microalbumin/Creat. Ratio         CANCELED    Hemoglobin A1c, (calculated)      4.8 - 5.6 %    7.4 (H)   Estimated average glucose      mg/dL    166     Sent her the following message through [employer]:  Hemoglobin A1c is a 3 month marker of your diabetes control. Goal is less than 7% which means your average blood sugar is less than 150. Your Hemoglobin A1c is 7.4% which means your diabetes is under better control than 9.4% at your last check and is the best A1c you have had in 8 years! Keep up the good work. Continue to work on your diet and exercise and take all your medications as directed.  -------------------------------------------------------------------------------------------------------------------  Total Cholesterol is the total number of cholesterol particles in your blood. Goal is less than 200. Your value is at goal.    Triglycerides are the short term fats in your blood. Goal is less than 150. Your value is at goal.    HDL is the good cholesterol in your blood. Goal is more than 50. Your value is at goal.    LDL is the bad cholesterol in your blood. Goal is less than 100. Your value is at goal.    Continue to follow a low cholesterol diet.   Try to limit the amount of fried foods, fatty foods, butter, gravy, red meat, ice cream, cheese, and eggs in your diet, which are all high in cholesterol. Take all of your medications (lipitor) as directed.  -------------------------------------------------------------------------------------------------------------------  BUN and creatinine are markers of kidney function. Your values are normal.  -------------------------------------------------------------------------------------------------------------------  ALT and AST are markers of liver function. Your values are normal.  -------------------------------------------------------------------------------------------------------------------  It appears your urine specimen was cancelled because there wasn't enough in the container so we'll have to recollect this at your next visit.

## 2017-09-28 NOTE — MR AVS SNAPSHOT
Visit Information Date & Time Provider Department Dept. Phone Encounter #  
 9/28/2017  1:30 PM Sathish Sarabia, [address] [place] [address] Diabetes and Endocrinology [phone] [id number] Follow-up Instructions Return in about 4 months (around 1/28/2018) for ok to use 12:10. Upcoming Health Maintenance Date Due Pneumococcal 19-64 Medium Risk (1 of 1 - PPSV23) 3/5/1990 DTaP/Tdap/Td series (1 - Tdap) 3/5/1992 EYE EXAM RETINAL OR DILATED Q1 12/9/2016 FOOT EXAM Q1 2/2/2017 INFLUENZA AGE 9 TO ADULT 8/1/2017 HEMOGLOBIN A1C Q6M 8/21/2017 MICROALBUMIN Q1 9/19/2017 LIPID PANEL Q1 2/21/2018 PAP AKA CERVICAL CYTOLOGY 8/15/2019 Allergies as of 9/28/2017  Review Complete On: 9/28/2017 By: Sathish Sarabia MD  
  
 Severity Noted Reaction Type Reactions Zocor [Simvastatin] Medium 11/20/2009   Intolerance Myalgia Lisinopril  05/26/2011    Cough Current Immunizations  Never Reviewed Name Date Influenza Vaccine Split 10/1/2012 Not reviewed this visit You Were Diagnosed With   
  
 Codes Comments Uncontrolled type I diabetes mellitus with neuropathy ([facility] [address]    -  Primary ICD-10-CM: E10.40, E10.65 ICD-9-CM: 250.63, 357.2 Hyperlipidemia LDL goal <100     ICD-10-CM: E78.5 ICD-9-CM: 272.4 Essential hypertension, benign     ICD-10-CM: I10 
ICD-9-CM: 401.1 Elevated LFTs     ICD-10-CM: R94.5 ICD-9-CM: 790.6 Vitals BP Pulse Height(growth percentile) Weight(growth percentile) BMI OB Status (!) 167/96 (!) 110 5' 5\" (1.651 m) 181 lb 1.6 oz (82.1 kg) 30.14 kg/m2 Having regular periods Smoking Status Current Every Day Smoker Vitals History BMI and BSA Data Body Mass Index Body Surface Area  
 30.14 kg/m 2 1.94 m 2 Preferred Pharmacy Pharmacy Name Phone Pedro Crispin [address] [place] [address], [address] [place] [address] [phone] Your Updated Medication List  
  
   
 This list is accurate as of: 17  2:09 PM.  Always use your most recent med list.  
  
  
  
  
 [address] Generic drug:  Blood-Glucose Meter Use as directed to test up to 8 times per day. Dx E10.65 ACCU-CHEK NORMA PLUS TEST STRP strip Generic drug:  glucose blood VI test strips Use as directed to test up to 8 times per day. Dx E10.65  
  
 atorvastatin 40 mg tablet Commonly known as:  LIPITOR Take 1 Tab by mouth daily. carvedilol 12.5 mg tablet Commonly known as:  Misty Alstrom Take 1 Tab by mouth two (2) times daily (with meals). EFFEXOR  mg capsule Generic drug:  venlafaxine-SR Take  by mouth daily. gabapentin 300 mg capsule Commonly known as:  NEURONTIN Take 2 Caps by mouth three (3) times daily. glucagon 1 mg injection Commonly known as:  GLUCAGON EMERGENCY KIT (HUMAN) Use as directed  
  
 insulin aspart 100 unit/mL Inpn Commonly known as:  Lissa Guido 1:15 for carbs and 1:50 > 150 for correction. Max 50 units per day--may sub humalog or apidra if preferred  
  
 insulin glargine 100 unit/mL (3 mL) Inpn Commonly known as:  LANTUS SOLOSTAR Inject 18 units before breakfast or as directed up to 50 units per day Insulin Syringe-Needle U-100 0.3 mL 31 gauge x 5/16 Syrg Use up to 4 times daily LASIX 20 mg tablet Generic drug:  furosemide Take  by mouth daily. PROTONIX 40 mg tablet Generic drug:  pantoprazole Take 40 mg by mouth daily. Prescriptions Sent to Pharmacy Refills  
 insulin glargine (LANTUS SOLOSTAR) 100 unit/mL (3 mL) inpn 11 Sig: Inject 18 units before breakfast or as directed up to 50 units per day Class: Normal  
 Pharmacy: [address], [address] [place] [address] Ph #: [phone]  
 insulin aspart (NOVOLOG FLEXPEN) 100 unit/mL inpn 11 Si:15 for carbs and 1:50 > 150 for correction.   Max 50 units per day--may sub humalog or apidra if preferred Class: Normal  
 Pharmacy: [address] Ph #: [phone]  
 glucagon (GLUCAGON EMERGENCY KIT, HUMAN,) 1 mg injection 11 Sig: Use as directed Class: Normal  
 Pharmacy: [address] Ph #: [phone]  
 atorvastatin (LIPITOR) 40 mg tablet 11 Sig: Take 1 Tab by mouth daily. Class: Normal  
 Pharmacy: [address] Ph #: [phone] Route: Oral  
 carvedilol (COREG) 12.5 mg tablet 11 Sig: Take 1 Tab by mouth two (2) times daily (with meals). Class: Normal  
 Pharmacy: [address] Ph #: [phone] Route: Oral  
 gabapentin (NEURONTIN) 300 mg capsule 11 Sig: Take 2 Caps by mouth three (3) times daily. Class: Normal  
 Pharmacy: [address] Ph #: [phone] Route: Oral  
  
We Performed the Following HEMOGLOBIN A1C WITH EAG [84860 CPT(R)] LIPID PANEL [16215 CPT(R)] METABOLIC PANEL, COMPREHENSIVE [97372 CPT(R)] MICROALBUMIN, UR, RAND W/ MICROALBUMIN/CREA RATIO [id number] CPT(R)] DC COLLECTION VENOUS BLOOD,VENIPUNCTURE [id number] CPT(R)] DC HANDLG&/OR CONVEY OF SPEC FOR TR OFFICE TO LAB [36193 CPT(R)] Follow-up Instructions Return in about 4 months (around 1/28/2018) for ok to use 12:10. Patient Instructions 1) We will use up the extended release coreg by taking one tab with breakfast and dinner and then change to immediate release coreg 12.5 mg twice daily. 2) Decrease the lantus to 18 units in the morning and see if this keeps your morning sugars between . If still dropping overnight more than 2 times in a week, then further decrease to 16 units in the morning. 3) Stay on the same novolog scale for now. 4) Use up the 1/2 tab of 80 mg of lipitor and I sent 40 mg tabs to the pharmacy. 5) I will send you a message through Par[employer] with your lab results. Introducing [facility] HE[facility]! Dear Osbaldo Finnegan: 
Thank you for requesting a Par[employer] account. Our records indicate that you already have an active ParAcc[employer] account. You can access your account anytime at [email or web address][email or web address] Did you know that you can access your hospital and ER discharge instructions at any time in ParAccel? You can also review all of your test results from your hospital stay or ER visit. Additional Information If you have questions, please visit the Frequently Asked Questions section of the ParAcc[employer] website at [email or web address] Remember, [employer] is NOT to be used for urgent needs. For medical emergencies, dial 911. Now available from your iPhone and Android! Please provide this summary of care documentation to your next provider. Your primary care clinician is listed as Cornelius Edouard. If you have any questions after today's visit, please call [phone].

## 2017-09-29 ENCOUNTER — TELEPHONE (OUTPATIENT)
Dept: ENDOCRINOLOGY | Age: 47
End: 2017-09-29

## 2017-09-29 RX ORDER — PEN NEEDLE, DIABETIC 30 GX3/16"
NEEDLE, DISPOSABLE MISCELLANEOUS
Qty: 1 PACKAGE | Refills: 11 | Status: SHIPPED | OUTPATIENT
Start: 2017-09-29 | End: 2017-11-20

## 2017-09-29 NOTE — TELEPHONE ENCOUNTER
----- Message from Aneta Blum sent at 9/28/2017  7:11 PM EDT -----  Regarding: Dr. Elizondo Party Telephone  Patient needs the \"needle to put on the top of the pens size 6 and 8\" to be sent to  [facility]. Please call patient back at [phone].

## 2017-09-29 NOTE — TELEPHONE ENCOUNTER
Patient was notified that her pen needles had been sent to her pharmacy. She also stated that Medicaid paid for all her medication.

## 2017-10-02 LAB
ALBUMIN SERPL-MCNC: 4 G/DL (ref 3.5–5.5)
ALBUMIN/CREAT UR: NORMAL
ALBUMIN/GLOB SERPL: 1.5 {RATIO} (ref 1.2–2.2)
ALP SERPL-CCNC: 72 IU/L (ref 39–117)
ALT SERPL-CCNC: 17 IU/L (ref 0–32)
AST SERPL-CCNC: 27 IU/L (ref 0–40)
BILIRUB SERPL-MCNC: 0.3 MG/DL (ref 0–1.2)
BUN SERPL-MCNC: 10 MG/DL (ref 6–24)
BUN/CREAT SERPL: 11 (ref 9–23)
CALCIUM SERPL-MCNC: 8.9 MG/DL (ref 8.7–10.2)
CHLORIDE SERPL-SCNC: 91 MMOL/L (ref 96–106)
CHOLEST SERPL-MCNC: 133 MG/DL (ref 100–199)
CO2 SERPL-SCNC: 21 MMOL/L (ref 18–29)
CREAT SERPL-MCNC: 0.87 MG/DL (ref 0.57–1)
CREAT UR-MCNC: NORMAL MG/DL
EST. AVERAGE GLUCOSE BLD GHB EST-MCNC: 166 MG/DL
GLOBULIN SER CALC-MCNC: 2.6 G/DL (ref 1.5–4.5)
GLUCOSE SERPL-MCNC: 402 MG/DL (ref 65–99)
HBA1C MFR BLD: 7.4 % (ref 4.8–5.6)
HDLC SERPL-MCNC: 72 MG/DL
INTERPRETATION, 910389: NORMAL
LDLC SERPL CALC-MCNC: 37 MG/DL (ref 0–99)
MICROALBUMIN UR-MCNC: NORMAL
POTASSIUM SERPL-SCNC: 4.8 MMOL/L (ref 3.5–5.2)
PROT SERPL-MCNC: 6.6 G/DL (ref 6–8.5)
SODIUM SERPL-SCNC: 133 MMOL/L (ref 134–144)
TRIGL SERPL-MCNC: 122 MG/DL (ref 0–149)
VLDLC SERPL CALC-MCNC: 24 MG/DL (ref 5–40)

## 2017-11-02 ENCOUNTER — TELEPHONE (OUTPATIENT)
Dept: ENDOCRINOLOGY | Age: 47
End: 2017-11-02

## 2017-11-02 RX ORDER — VENLAFAXINE HYDROCHLORIDE 150 MG/1
150 CAPSULE, EXTENDED RELEASE ORAL 2 TIMES DAILY
COMMUNITY
Start: 2017-11-02 | End: 2017-11-26

## 2017-11-02 RX ORDER — ALPRAZOLAM 0.5 MG/1
0.5 TABLET ORAL 4 TIMES DAILY
COMMUNITY
Start: 2017-11-02 | End: 2017-11-26

## 2017-11-02 RX ORDER — CARVEDILOL 12.5 MG/1
12.5 TABLET ORAL DAILY
Qty: 60 TAB | Refills: 11 | Status: ON HOLD | COMMUNITY
Start: 2017-11-02 | End: 2017-11-26

## 2017-11-02 RX ORDER — LOSARTAN POTASSIUM 25 MG/1
25 TABLET ORAL DAILY
COMMUNITY
Start: 2017-11-02 | End: 2017-11-26

## 2017-11-02 NOTE — TELEPHONE ENCOUNTER
Patient said she saw her kidney doctor and she was told she was spilling protein in her urine. Her bp was 143/110 and her pulse was ranging from 103-156. Patient was put on a new blood pressure medication. Losartan 25 mg one tab daily and her Coreg was decreased to one tab in the am only. She stated that she also saw a new psychiatrist and she was put on Xanax . 5 four times a day along with Effexor 150 mg bid. Patient would like to know your opinion on these meds and if you feel its okay for her to take them.

## 2017-11-03 NOTE — TELEPHONE ENCOUNTER
Please let her know I feel all of these change are acceptable and I have updated her med list with these new doses.

## 2017-11-07 NOTE — TELEPHONE ENCOUNTER
Sent her the following message through [address]:    Qiana Swartz is off this week and had tried to call you back on 11/3/17 after you had asked me to review your new meds. I think all of them look appropriate and have updated your med list with the new doses. Let me know if you need anything else.

## 2017-11-08 ENCOUNTER — TELEPHONE (OUTPATIENT)
Dept: ENDOCRINOLOGY | Age: 47
End: 2017-11-08

## 2017-11-08 NOTE — TELEPHONE ENCOUNTER
Spoke with pt to make her aware that per her provider, the change in her medication and dose was appropriate and that he also sent her a Interview message last week in regards to this. Pt then stated that she is concern that she may go into a diabetic coma because she is very depress. Pt's bs is currently 302. Pt started to cry while talking and stated that she will not go to the hospital if her bs  gets too high and she started talking about the death of her mother.

## 2017-11-08 NOTE — TELEPHONE ENCOUNTER
Called and left a vm on pt's cell phone that I received her message today and I'm happy to talk to her more about what's going on. I'm on call this week and if she needs to have the after hours service page me, I'm happy to talk to her tonight but otherwise she can send me a message through [employer] if she needs anything.

## 2017-11-14 ENCOUNTER — OFFICE VISIT (OUTPATIENT)
Dept: INTERNAL MEDICINE CLINIC | Age: 47
End: 2017-11-14

## 2017-11-14 ENCOUNTER — TELEPHONE (OUTPATIENT)
Dept: ENDOCRINOLOGY | Age: 47
End: 2017-11-14

## 2017-11-14 VITALS
BODY MASS INDEX: 28.99 KG/M2 | HEART RATE: 107 BPM | HEIGHT: 65 IN | DIASTOLIC BLOOD PRESSURE: 63 MMHG | WEIGHT: 174 LBS | RESPIRATION RATE: 18 BRPM | SYSTOLIC BLOOD PRESSURE: 100 MMHG | TEMPERATURE: 98.4 F | OXYGEN SATURATION: 99 %

## 2017-11-14 DIAGNOSIS — Z23 ENCOUNTER FOR IMMUNIZATION: ICD-10-CM

## 2017-11-14 DIAGNOSIS — I10 ESSENTIAL HYPERTENSION, BENIGN: ICD-10-CM

## 2017-11-14 DIAGNOSIS — Z72.0 TOBACCO ABUSE: ICD-10-CM

## 2017-11-14 DIAGNOSIS — E78.5 HYPERLIPIDEMIA LDL GOAL <100: ICD-10-CM

## 2017-11-14 DIAGNOSIS — R11.2 NAUSEA AND VOMITING, INTRACTABILITY OF VOMITING NOT SPECIFIED, UNSPECIFIED VOMITING TYPE: ICD-10-CM

## 2017-11-14 DIAGNOSIS — F32.A ANXIETY AND DEPRESSION: Chronic | ICD-10-CM

## 2017-11-14 DIAGNOSIS — E10.69 TYPE 1 DIABETES MELLITUS WITH OTHER SPECIFIED COMPLICATION (HCC): Primary | ICD-10-CM

## 2017-11-14 DIAGNOSIS — F41.9 ANXIETY AND DEPRESSION: Chronic | ICD-10-CM

## 2017-11-14 LAB
LEFT EYE DIABETIC RETINOPATHY: ABNORMAL
LEFT EYE IMAGE QUALITY: ABNORMAL
LEFT EYE MACULAR EDEMA: ABNORMAL
LEFT EYE OTHER RETINOPATHY: ABNORMAL
RESULT: ABNORMAL
RIGHT EYE DIABETIC RETINOPATHY: ABNORMAL
RIGHT EYE IMAGE QUALITY: ABNORMAL
RIGHT EYE MACULAR EDEMA: ABNORMAL
RIGHT EYE OTHER RETINOPATHY: ABNORMAL
SEVERITY: ABNORMAL

## 2017-11-14 RX ORDER — ESOMEPRAZOLE MAGNESIUM 40 MG/1
40 CAPSULE, DELAYED RELEASE ORAL DAILY
Qty: 30 CAP | Refills: 9 | Status: SHIPPED | OUTPATIENT
Start: 2017-11-14 | End: 2017-12-12

## 2017-11-14 RX ORDER — FUROSEMIDE 40 MG/1
40 TABLET ORAL DAILY
COMMUNITY
End: 2017-11-26

## 2017-11-14 RX ORDER — INSULIN LISPRO 100 [IU]/ML
INJECTION, SOLUTION INTRAVENOUS; SUBCUTANEOUS
COMMUNITY
End: 2017-11-26

## 2017-11-14 RX ORDER — IBUPROFEN 200 MG
1 TABLET ORAL EVERY 24 HOURS
Qty: 30 PATCH | Refills: 0 | Status: ON HOLD | OUTPATIENT
Start: 2017-11-14 | End: 2017-11-26

## 2017-11-14 RX ORDER — PROMETHAZINE HYDROCHLORIDE 25 MG/1
25 TABLET ORAL
Qty: 30 TAB | Refills: 3 | Status: ON HOLD | OUTPATIENT
Start: 2017-11-14 | End: 2017-11-26

## 2017-11-14 NOTE — PROGRESS NOTES
Retinal scan results reviewed with patient. Patient verbalized understanding and does not have any questions at this time. Patient given contact information for Dr. Sd Hughes to schedule an appointment.

## 2017-11-14 NOTE — TELEPHONE ENCOUNTER
Patient called to say she took her blood sugar this morning and it was over 600. She checked it later on and it had dropped to 100, which made her feel really bad. She would like a call back to see how she can keep these blood sugars from fluctuating so much. She's not to see Dr. Arabella Ohara again until  1/30/18 at 12:10 PM.      Luis Black can be reached at:   [phone].

## 2017-11-14 NOTE — TELEPHONE ENCOUNTER
When she calls back, ask her if there is some reason she feels her sugars are fluctuating like stress or pain or food or missing doses of medication as her sugars had been running quite well when I saw her recently and I'm trying to figure out what is different.

## 2017-11-14 NOTE — MR AVS SNAPSHOT
Visit Information Date & Time Provider Department Dept. Phone Encounter #  
 11/14/2017  9:30 AM Jacqueline Childs, [address] [id number] Follow-up Instructions Return in about 3 months (around 2/14/2018). Your Appointments 1/30/2018 12:10 PM  
Follow Up with Elfrieda Ahumada, MD [facility] [address]) Appt Note: f/u  dm   ok per provider [phone] [facility] Upcoming Health Maintenance Date Due DTaP/Tdap/Td series (1 - Tdap) 3/5/1992 EYE EXAM RETINAL OR DILATED Q1 12/9/2016 HEMOGLOBIN A1C Q6M 3/28/2018 FOOT EXAM Q1 9/28/2018 MICROALBUMIN Q1 9/28/2018 LIPID PANEL Q1 9/28/2018 PAP AKA CERVICAL CYTOLOGY 8/15/2019 Allergies as of 11/14/2017  Review Complete On: 11/14/2017 By: Arnaldo Sanabria III, DO Severity Noted Reaction Type Reactions Zocor [Simvastatin] Medium 11/20/2009   Intolerance Myalgia Lisinopril  05/26/2011    Cough Current Immunizations  Never Reviewed Name Date Influenza Vaccine Split 10/1/2012 Not reviewed this visit You Were Diagnosed With   
  
 Codes Comments Type 1 diabetes mellitus with other specified complication ([facility])    -  Primary ICD-10-CM: E10.69 ICD-9-CM: 250.81 Tobacco abuse     ICD-10-CM: Z72.0 ICD-9-CM: 305.1 Essential hypertension, benign     ICD-10-CM: I10 
ICD-9-CM: 401.1 Hyperlipidemia LDL goal <100     ICD-10-CM: E78.5 ICD-9-CM: 272.4 Anxiety and depression     ICD-10-CM: F41.8 ICD-9-CM: 300.00, 311 Nausea and vomiting, intractability of vomiting not specified, unspecified vomiting type     ICD-10-CM: R11.2 ICD-9-CM: 787.01 Vitals BP Pulse Temp Resp Height(growth percentile) Weight(growth percentile) 100/63 (BP 1 Location: Right arm, BP Patient Position: Sitting) (!) 107 98.4 °F (36.9 °C) (Oral) 18 5' 4.5\" (1.638 m) 174 lb (78.9 kg) SpO2 BMI OB Status Smoking Status 99% 29.41 kg/m2 Implant Current Every Day Smoker Vitals History BMI and BSA Data Body Mass Index Body Surface Area  
 29.41 kg/m 2 1.89 m 2 Preferred Pharmacy Pharmacy Name Phone [facility] [address] [facility] [phone] Your Updated Medication List  
  
   
This list is accurate as of: 11/14/17 10:40 AM.  Always use your most recent med list.  
  
  
  
  
 [address] Generic drug:  Blood-Glucose Meter Use as directed to test up to 8 times per day. Dx E10.65 ACCU-CHEK NORMA PLUS TEST STRP strip Generic drug:  glucose blood VI test strips Use as directed to test up to 8 times per day. Dx E10.65 ALPRAZolam 0.5 mg tablet Commonly known as:  Laurance Kaska Take 1 Tab by mouth four (4) times daily. Max Daily Amount: 2 mg. atorvastatin 40 mg tablet Commonly known as:  LIPITOR Take 1 Tab by mouth daily. carvedilol 12.5 mg tablet Commonly known as:  Monda Lovings Take 1 Tab by mouth daily. EFFEXOR  mg capsule Generic drug:  venlafaxine-SR Take 1 Cap by mouth two (2) times a day. esomeprazole 40 mg capsule Commonly known as:  NexIUM Take 1 Cap by mouth daily. gabapentin 300 mg capsule Commonly known as:  NEURONTIN Take 2 Caps by mouth three (3) times daily. glucagon 1 mg injection Commonly known as:  GLUCAGON EMERGENCY KIT (HUMAN) Use as directed HumaLOG 100 unit/mL injection Generic drug:  insulin lispro  
by SubCUTAneous route. Sliding scale  
  
 insulin aspart 100 unit/mL Inpn Commonly known as:  Sosa Roberth 1:15 for carbs and 1:50 > 150 for correction. Max 50 units per day--may sub humalog or apidra if preferred  
  
 insulin glargine 100 unit/mL (3 mL) Inpn Commonly known as:  LANTUS SOLOSTAR Inject 18 units before breakfast or as directed up to 50 units per day Insulin Needles (Disposable) 31 gauge x 5/16\" Ndle As directed for insulin pen injections Insulin Syringe-Needle U-100 0.3 mL 31 gauge x 5/16 Syrg Use up to 4 times daily * LASIX 20 mg tablet Generic drug:  furosemide Take  by mouth daily. * LASIX 40 mg tablet Generic drug:  furosemide Take 40 mg by mouth daily. losartan 25 mg tablet Commonly known as:  COZAAR Take 1 Tab by mouth daily. nicotine 21 mg/24 hr  
Commonly known as:  NICODERM CQ  
1 Patch by TransDERmal route every twenty-four (24) hours for 30 days. promethazine 25 mg tablet Commonly known as:  PHENERGAN Take 1 Tab by mouth every six (6) hours as needed for Nausea. PROTONIX 40 mg tablet Generic drug:  pantoprazole Take 40 mg by mouth daily. * Notice: This list has 2 medication(s) that are the same as other medications prescribed for you. Read the directions carefully, and ask your doctor or other care provider to review them with you. Prescriptions Sent to Pharmacy Refills  
 nicotine (NICODERM CQ) 21 mg/24 hr 0 Si Patch by TransDERmal route every twenty-four (24) hours for 30 days. Class: Normal  
 Pharmacy: [address] Ph #: [phone] Route: TransDERmal  
 esomeprazole (NEXIUM) 40 mg capsule 9 Sig: Take 1 Cap by mouth daily. Class: Normal  
 Pharmacy: [address] Ph #: [phone] Route: Oral  
 promethazine (PHENERGAN) 25 mg tablet 3 Sig: Take 1 Tab by mouth every six (6) hours as needed for Nausea. Class: Normal  
 Pharmacy: [address] Ph #: [phone] Route: Oral  
  
We Performed the Following FUNDUS PHOTOGRAPHY [id number] CPT(R)] Follow-up Instructions Return in about 3 months (around 2/14/2018). To-Do List   
 11/14/2017 Imaging:  [facility] Patient Instructions Learning About Diabetes Food Guidelines Your Care Instructions Meal planning is important to manage diabetes. It helps keep your blood sugar at a target level (which you set with your doctor). You don't have to eat special foods. You can eat what your family eats, including sweets once in a while. But you do have to pay attention to how often you eat and how much you eat of certain foods. You may want to work with a dietitian or a certified diabetes educator (CDE) to help you plan meals and snacks. A dietitian or CDE can also help you lose weight if that is one of your goals. What should you know about eating carbs? Managing the amount of carbohydrate (carbs) you eat is an important part of healthy meals when you have diabetes. Carbohydrate is found in many foods. · Learn which foods have carbs. And learn the amounts of carbs in different foods. ¨ Bread, cereal, pasta, and rice have about 15 grams of carbs in a serving. A serving is 1 slice of bread (1 ounce), ½ cup of cooked cereal, or 1/3 cup of cooked pasta or rice. ¨ Fruits have 15 grams of carbs in a serving. A serving is 1 small fresh fruit, such as an apple or orange; ½ of a banana; ½ cup of cooked or canned fruit; ½ cup of fruit juice; 1 cup of melon or raspberries; or 2 tablespoons of dried fruit. ¨ Milk and no-sugar-added yogurt have 15 grams of carbs in a serving. A serving is 1 cup of milk or 2/3 cup of no-sugar-added yogurt. ¨ Starchy vegetables have 15 grams of carbs in a serving. A serving is ½ cup of mashed potatoes or sweet potato; 1 cup winter squash; ½ of a small baked potato; ½ cup of cooked beans; or ½ cup cooked corn or green peas. · Learn how much carbs to eat each day and at each meal. A dietitian or CDE can teach you how to keep track of the amount of carbs you eat.  This is called carbohydrate counting. · If you are not sure how to count carbohydrate grams, use the Plate Method to plan meals. It is a good, quick way to make sure that you have a balanced meal. It also helps you spread carbs throughout the day. ¨ Divide your plate by types of foods. Put non-starchy vegetables on half the plate, meat or other protein food on one-quarter of the plate, and a grain or starchy vegetable in the final quarter of the plate. To this you can add a small piece of fruit and 1 cup of milk or yogurt, depending on how many carbs you are supposed to eat at a meal. 
· Try to eat about the same amount of carbs at each meal. Do not \"save up\" your daily allowance of carbs to eat at one meal. 
· Proteins have very little or no carbs per serving. Examples of proteins are beef, chicken, turkey, fish, eggs, tofu, cheese, cottage cheese, and peanut butter. A serving size of meat is 3 ounces, which is about the size of a deck of cards. Examples of meat substitute serving sizes (equal to 1 ounce of meat) are 1/4 cup of cottage cheese, 1 egg, 1 tablespoon of peanut butter, and ½ cup of tofu. How can you eat out and still eat healthy? · Learn to estimate the serving sizes of foods that have carbohydrate. If you measure food at home, it will be easier to estimate the amount in a serving of restaurant food. · If the meal you order has too much carbohydrate (such as potatoes, corn, or baked beans), ask to have a low-carbohydrate food instead. Ask for a salad or green vegetables. · If you use insulin, check your blood sugar before and after eating out to help you plan how much to eat in the future. · If you eat more carbohydrate at a meal than you had planned, take a walk or do other exercise. This will help lower your blood sugar. What else should you know? · Limit saturated fat, such as the fat from meat and dairy products.  This is a healthy choice because people who have diabetes are at higher risk of heart disease. So choose lean cuts of meat and nonfat or low-fat dairy products. Use olive or canola oil instead of butter or shortening when cooking. · Don't skip meals. Your blood sugar may drop too low if you skip meals and take insulin or certain medicines for diabetes. · Check with your doctor before you drink alcohol. Alcohol can cause your blood sugar to drop too low. Alcohol can also cause a bad reaction if you take certain diabetes medicines. Follow-up care is a key part of your treatment and safety. Be sure to make and go to all appointments, and call your doctor if you are having problems. It's also a good idea to know your test results and keep a list of the medicines you take. Where can you learn more? Go to http://mac-rufus.info/. Enter G411 in the search box to learn more about \"Learning About Diabetes Food Guidelines. \" Current as of: March 13, 2017 Content Version: 11.4 © 7343-6596 HealthiNation. Care instructions adapted under license by [employer] (which disclaims liability or warranty for this information). If you have questions about a medical condition or this instruction, always ask your healthcare professional. [address] any warranty or liability for your use of this information. Gastroparesis: Care Instructions Your Care Instructions When you have gastroparesis, your stomach takes a lot longer to empty. This delay can cause belly pain, bloating, and belching. It also can cause hiccups, heartburn, nausea or vomiting. You may not feel like eating. These symptoms may come and go. They most often occur during and after meals. You may feel full after only a few bites of food. This condition occurs when the nerves to the stomach don't work properly. Diabetes is the most common cause of this nerve damage. Gastroparesis can make it harder to control your blood sugar levels.  But keeping your blood sugar levels under control may help with your symptoms. Parkinson's disease, stroke, and some medicines can also cause this condition. Home treatment can often help. Follow-up care is a key part of your treatment and safety. Be sure to make and go to all appointments, and call your doctor if you are having problems. It's also a good idea to know your test results and keep a list of the medicines you take. How can you care for yourself at home? · Eat several small meals each day rather than three large meals. · Eat foods that are low in fiber and fat. · If your doctor suggests it, take medicines that help the stomach empty more quickly. These are called motility agents. When should you call for help? Call your doctor now or seek immediate medical care if: 
? · You are vomiting. ? · You have new or worse belly pain. ? · You have a fever. ? · You cannot pass stools or gas. ? Watch closely for changes in your health, and be sure to contact your doctor if you have any problems. Where can you learn more? Go to http://mac-rufus.info/. Enter M106 in the search box to learn more about \"Gastroparesis: Care Instructions. \" Current as of: May 12, 2017 Content Version: 11.4 © 9896-0406 Healthwise, Incorporated. Care instructions adapted under license by [employer] (which disclaims liability or warranty for this information). If you have questions about a medical condition or this instruction, always ask your healthcare professional. [place] [address] any warranty or liability for your use of this information. Introducing [facility] & [facility]! Dear Darleen Yi: 
Thank you for requesting a Webflow account. Our records indicate that you already have an active Webflow account. You can access your account anytime at [email or web address] Did you know that you can access your hospital and ER discharge instructions at any time in [email or web address]? You can also review all of your test results from your hospital stay or ER visit. Additional Information If you have questions, please visit the Frequently Asked Questions section of the [email or web address] website at [email or web address] Remember, [email or web address] is NOT to be used for urgent needs. For medical emergencies, dial 911. Now available from your iPhone and Android! Please provide this summary of care documentation to your next provider. Your primary care clinician is listed as Andrew Hill If you have any questions after today's visit, please call [phone].

## 2017-11-14 NOTE — PROGRESS NOTES
Retinal scan done today suggests both eyes have some retinal damage, would recommend having her see eye specialist soon. Referral to dr Tnoi Guajardo.

## 2017-11-14 NOTE — PATIENT INSTRUCTIONS
Learning About Diabetes Food Guidelines  Your Care Instructions    Meal planning is important to manage diabetes. It helps keep your blood sugar at a target level (which you set with your doctor). You don't have to eat special foods. You can eat what your family eats, including sweets once in a while. But you do have to pay attention to how often you eat and how much you eat of certain foods. You may want to work with a dietitian or a certified diabetes educator (CDE) to help you plan meals and snacks. A dietitian or CDE can also help you lose weight if that is one of your goals. What should you know about eating carbs? Managing the amount of carbohydrate (carbs) you eat is an important part of healthy meals when you have diabetes. Carbohydrate is found in many foods. · Learn which foods have carbs. And learn the amounts of carbs in different foods. ¨ Bread, cereal, pasta, and rice have about 15 grams of carbs in a serving. A serving is 1 slice of bread (1 ounce), ½ cup of cooked cereal, or 1/3 cup of cooked pasta or rice. ¨ Fruits have 15 grams of carbs in a serving. A serving is 1 small fresh fruit, such as an apple or orange; ½ of a banana; ½ cup of cooked or canned fruit; ½ cup of fruit juice; 1 cup of melon or raspberries; or 2 tablespoons of dried fruit. ¨ Milk and no-sugar-added yogurt have 15 grams of carbs in a serving. A serving is 1 cup of milk or 2/3 cup of no-sugar-added yogurt. ¨ Starchy vegetables have 15 grams of carbs in a serving. A serving is ½ cup of mashed potatoes or sweet potato; 1 cup winter squash; ½ of a small baked potato; ½ cup of cooked beans; or ½ cup cooked corn or green peas. · Learn how much carbs to eat each day and at each meal. A dietitian or CDE can teach you how to keep track of the amount of carbs you eat. This is called carbohydrate counting. · If you are not sure how to count carbohydrate grams, use the Plate Method to plan meals.  It is a good, quick way to make sure that you have a balanced meal. It also helps you spread carbs throughout the day. ¨ Divide your plate by types of foods. Put non-starchy vegetables on half the plate, meat or other protein food on one-quarter of the plate, and a grain or starchy vegetable in the final quarter of the plate. To this you can add a small piece of fruit and 1 cup of milk or yogurt, depending on how many carbs you are supposed to eat at a meal.  · Try to eat about the same amount of carbs at each meal. Do not \"save up\" your daily allowance of carbs to eat at one meal.  · Proteins have very little or no carbs per serving. Examples of proteins are beef, chicken, turkey, fish, eggs, tofu, cheese, cottage cheese, and peanut butter. A serving size of meat is 3 ounces, which is about the size of a deck of cards. Examples of meat substitute serving sizes (equal to 1 ounce of meat) are 1/4 cup of cottage cheese, 1 egg, 1 tablespoon of peanut butter, and ½ cup of tofu. How can you eat out and still eat healthy? · Learn to estimate the serving sizes of foods that have carbohydrate. If you measure food at home, it will be easier to estimate the amount in a serving of restaurant food. · If the meal you order has too much carbohydrate (such as potatoes, corn, or baked beans), ask to have a low-carbohydrate food instead. Ask for a salad or green vegetables. · If you use insulin, check your blood sugar before and after eating out to help you plan how much to eat in the future. · If you eat more carbohydrate at a meal than you had planned, take a walk or do other exercise. This will help lower your blood sugar. What else should you know? · Limit saturated fat, such as the fat from meat and dairy products. This is a healthy choice because people who have diabetes are at higher risk of heart disease. So choose lean cuts of meat and nonfat or low-fat dairy products.  Use olive or canola oil instead of butter or shortening when cooking. · Don't skip meals. Your blood sugar may drop too low if you skip meals and take insulin or certain medicines for diabetes. · Check with your doctor before you drink alcohol. Alcohol can cause your blood sugar to drop too low. Alcohol can also cause a bad reaction if you take certain diabetes medicines. Follow-up care is a key part of your treatment and safety. Be sure to make and go to all appointments, and call your doctor if you are having problems. It's also a good idea to know your test results and keep a list of the medicines you take. Where can you learn more? Go to http://mac-rufus.info/. Enter L431 in the search box to learn more about \"Learning About Diabetes Food Guidelines. \"  Current as of: March 13, 2017  Content Version: 11.4  © 1331-0110 [employer]. Care instructions adapted under license by [employer] (which disclaims liability or warranty for this information). If you have questions about a medical condition or this instruction, always ask your healthcare professional. [place] [address] any warranty or liability for your use of this information. Gastroparesis: Care Instructions  Your Care Instructions    When you have gastroparesis, your stomach takes a lot longer to empty. This delay can cause belly pain, bloating, and belching. It also can cause hiccups, heartburn, nausea or vomiting. You may not feel like eating. These symptoms may come and go. They most often occur during and after meals. You may feel full after only a few bites of food. This condition occurs when the nerves to the stomach don't work properly. Diabetes is the most common cause of this nerve damage. Gastroparesis can make it harder to control your blood sugar levels. But keeping your blood sugar levels under control may help with your symptoms. Parkinson's disease, stroke, and some medicines can also cause this condition.   Home treatment can often help. Follow-up care is a key part of your treatment and safety. Be sure to make and go to all appointments, and call your doctor if you are having problems. It's also a good idea to know your test results and keep a list of the medicines you take. How can you care for yourself at home? · Eat several small meals each day rather than three large meals. · Eat foods that are low in fiber and fat. · If your doctor suggests it, take medicines that help the stomach empty more quickly. These are called motility agents. When should you call for help? Call your doctor now or seek immediate medical care if:  ? · You are vomiting. ? · You have new or worse belly pain. ? · You have a fever. ? · You cannot pass stools or gas. ? Watch closely for changes in your health, and be sure to contact your doctor if you have any problems. Where can you learn more? Go to http://mac-rufus.info/. Enter M106 in the search box to learn more about \"Gastroparesis: Care Instructions. \"  Current as of: May 12, 2017  Content Version: 11.4  © 8948-4549 [employer]. Care instructions adapted under license by [employer] (which disclaims liability or warranty for this information). If you have questions about a medical condition or this instruction, always ask your healthcare professional. [address] any warranty or liability for your use of this information.

## 2017-11-14 NOTE — PROGRESS NOTES
Renea Aguiar is a 56 y.o. female who presents for evaluation of new pt visit. [facility] so[facility], [facility], dka. Had trach and peg for about 6 weeks. Doing much better now, and has [employer].       ROS:  Constitutional: negative for fevers, chills, anorexia and weight loss  Eyes:   negative for visual disturbance and irritation  ENT:   negative for tinnitus,sore throat,nasal congestion,ear pain,hoarseness  Respiratory:  negative for cough, hemoptysis, dyspnea,wheezing  CV:   negative for chest pain, palpitations, lower extremity edema  GI:   negative for nausea, vomiting, diarrhea, abdominal pain,melena  Genitourinary: negative for frequency, dysuria and hematuria  Musculoskel: negative for myalgias, arthralgias, back pain, muscle weakness, joint pain  Neurological:  negative for headaches, dizziness, focal weakness, numbness  Psychiatric:     ++ for depression or anxiety      Past Medical History:   Diagnosis Date    Achilles tendon rupture     along with torn right patellar/femoral ligament    Arthritis     rt. foot    Chronic pain     abdominal pain    Diabetes (HCC)     IDDM on insulin pump and sensor    DM type 1 (diabetes mellitus, type 1) ([facility] [address]     age 25    Endometriosis     s/p ex-lap 4x    Gastric ulcer     GERD (gastroesophageal reflux disease)     Herpes     Other and unspecified hyperlipidemia     Panic attacks     Psychiatric disorder     anxiety, panic attacks    PTSD (post-traumatic stress disorder)     Unspecified essential hypertension        Past Surgical History:   Procedure Laterality Date    HX GYN      2 d&c's    HX GYN      laparoscopies x3    HX ORTHOPAEDIC  2002    achiles tendon repair,talus repair    HX ORTHOPAEDIC      injections x2 to right shoulder       Family History   Problem Relation Age of Onset    Diabetes Mother      diet controlled    Diabetes Father    [address] [facility] Heart Disease Paternal Uncle      MI in his 46s    Stroke Neg Hx        Social History     Social History    Marital status: SINGLE     Spouse name: N/A    Number of children: N/A    Years of education: N/A     Occupational History    Not on file. Social History Main Topics    Smoking status: Current Every Day Smoker     Packs/day: 1.50     Years: 17.00    Smokeless tobacco: Never Used    Alcohol use Yes      Comment: a beer every few months    Drug use: No    Sexual activity: Yes     Partners: Male     Other Topics Concern    Not on file     Social History Narrative            Visit Vitals    /63 (BP 1 Location: Right arm, BP Patient Position: Sitting)    Pulse (!) 107    Temp 98.4 °F (36.9 °C) (Oral)    Resp 18    Ht 5' 4.5\" (1.638 m)    Wt 174 lb (78.9 kg)    SpO2 99%    BMI 29.41 kg/m2       Physical Examination:   General - Well appearing female  HEENT - PERRL, TM no erythema/opacification, normal nasal turbinates, no oropharyngeal erythema or exudate, MMM  Neck - supple, no bruits, no thyroidomegaly, no lymphadenopathy  Pulm - clear to auscultation bilaterally  Cardio - RRR, normal S1 S2, no murmur  Abd - soft, nontender, no masses, no HSM  Extrem - no edema, +2 distal pulses  Neuro-  No focal deficits, CN intact     Assessment/Plan:    1. N/v--check gastric emptying study for gastroparesis. rx given for phenergan  2. Tobacco abuse--rx given for patches  3.  htn--continue coreg, cozaar  4.  hyperlipids--last LDL 37, on lipitor  5. Uncontrolled dm, type 1--last a1c 7.4. On lantus. Follows with dr Michaelle Nayak  6. Anxiety and depression--follows with psych, dr Robbie Madera. On xanax and effexor thru him  7. Routine adult health maintenance--had mamm with dr Chet Glass. Flu shot, pneumovax 23 and retinal exam all done today.     rtc 3 months        Jose Angel Forman III, DO

## 2017-11-14 NOTE — PROGRESS NOTES
Reviewed record in preparation for visit and have obtained necessary documentation. Identified pt with two pt identifiers(name and ). Chief Complaint   Patient presents with   [address] Maintenance Due   Topic Date Due    Pneumococcal 19-64 Medium Risk (1 of 1 - PPSV23) 1990    DTaP/Tdap/Td series (1 - Tdap) 1992    EYE EXAM RETINAL OR DILATED Q1  2016    Influenza Age 5 to Adult  2017       Ms. Irene Renee has a reminder for a \"due or due soon\" health maintenance. I have asked that she discuss health maintenance topic(s) due with Her  primary care provider. Coordination of Care Questionnaire:  :     1) Have you been to an emergency room, urgent care clinic since your last visit? no   Hospitalized since your last visit? no             2) Have you seen or consulted any other health care providers outside of [address] [place] [address] since your last visit? yes  (Include any pap smears or colon screenings in this section.)    3) Do you have an Advance Directive on file? no    4) Are you interested in receiving information on Advance Directives? NO    Patient is accompanied by self I have received verbal consent from Juliet White to discuss any/all medical information while they are present in the room.

## 2017-11-15 ENCOUNTER — TELEPHONE (OUTPATIENT)
Dept: INTERNAL MEDICINE CLINIC | Age: 47
End: 2017-11-15

## 2017-11-15 NOTE — TELEPHONE ENCOUNTER
----- Message from Naz Boswell sent at 11/14/2017  5:14 PM EST -----  Regarding: Dr. Joanna Butterfield  Please send report from visit pertaining to Retina damage to [facility] to Dr. Rasheeda Dickerson; pt has appointment on Friday, Nov. 17, 2017  Telephone [phone]  Aditya Lloyd [phone]    Best contact  [phone]      Message copied/pasted from [facility]

## 2017-11-20 ENCOUNTER — APPOINTMENT (OUTPATIENT)
Dept: CT IMAGING | Age: 47
DRG: 420 | End: 2017-11-20
Attending: EMERGENCY MEDICINE
Payer: MEDICAID

## 2017-11-20 ENCOUNTER — HOSPITAL ENCOUNTER (INPATIENT)
Age: 47
LOS: 6 days | Discharge: HOME HEALTH | DRG: 420 | End: 2017-11-26
Attending: EMERGENCY MEDICINE | Admitting: INTERNAL MEDICINE
Payer: MEDICAID

## 2017-11-20 ENCOUNTER — APPOINTMENT (OUTPATIENT)
Dept: GENERAL RADIOLOGY | Age: 47
DRG: 420 | End: 2017-11-20
Attending: EMERGENCY MEDICINE
Payer: MEDICAID

## 2017-11-20 DIAGNOSIS — F32.A ANXIETY AND DEPRESSION: Chronic | ICD-10-CM

## 2017-11-20 DIAGNOSIS — N17.9 ACUTE RENAL FAILURE, UNSPECIFIED ACUTE RENAL FAILURE TYPE (HCC): ICD-10-CM

## 2017-11-20 DIAGNOSIS — E78.5 HYPERLIPIDEMIA LDL GOAL <100: ICD-10-CM

## 2017-11-20 DIAGNOSIS — R40.1 STUPOR: ICD-10-CM

## 2017-11-20 DIAGNOSIS — G93.40 ENCEPHALOPATHY ACUTE: ICD-10-CM

## 2017-11-20 DIAGNOSIS — E10.10 DIABETIC KETOACIDOSIS WITHOUT COMA ASSOCIATED WITH TYPE 1 DIABETES MELLITUS (HCC): Primary | ICD-10-CM

## 2017-11-20 DIAGNOSIS — F41.9 ANXIETY AND DEPRESSION: Chronic | ICD-10-CM

## 2017-11-20 DIAGNOSIS — Z72.0 TOBACCO ABUSE: ICD-10-CM

## 2017-11-20 PROBLEM — E11.10 DKA (DIABETIC KETOACIDOSES): Status: ACTIVE | Noted: 2017-11-20

## 2017-11-20 LAB
ADMINISTERED INITIALS, ADMINIT: NORMAL
ALBUMIN SERPL-MCNC: 2.5 G/DL (ref 3.5–5)
ALBUMIN/GLOB SERPL: 0.6 {RATIO} (ref 1.1–2.2)
ALP SERPL-CCNC: 151 U/L (ref 45–117)
ALT SERPL-CCNC: 152 U/L (ref 12–78)
AMMONIA PLAS-SCNC: 22 UMOL/L
AMORPH CRY URNS QL MICRO: ABNORMAL
AMPHET UR QL SCN: NEGATIVE
ANION GAP SERPL CALC-SCNC: 16 MMOL/L (ref 5–15)
ANION GAP SERPL CALC-SCNC: 29 MMOL/L (ref 5–15)
APAP SERPL-MCNC: <2 UG/ML (ref 10–30)
APPEARANCE UR: ABNORMAL
AST SERPL-CCNC: 392 U/L (ref 15–37)
BACTERIA URNS QL MICRO: NEGATIVE /HPF
BARBITURATES UR QL SCN: NEGATIVE
BASE DEFICIT BLDV-SCNC: 21.5 MMOL/L
BASOPHILS # BLD: 0 K/UL
BASOPHILS NFR BLD: 0 %
BDY SITE: ABNORMAL
BENZODIAZ UR QL: POSITIVE
BILIRUB SERPL-MCNC: 0.7 MG/DL (ref 0.2–1)
BILIRUB UR QL CFM: NEGATIVE
BNP SERPL-MCNC: 4924 PG/ML (ref 0–125)
BUN SERPL-MCNC: 48 MG/DL (ref 6–20)
BUN SERPL-MCNC: 63 MG/DL (ref 6–20)
BUN/CREAT SERPL: 29 (ref 12–20)
BUN/CREAT SERPL: 29 (ref 12–20)
CALCIUM SERPL-MCNC: 7.5 MG/DL (ref 8.5–10.1)
CALCIUM SERPL-MCNC: 8.5 MG/DL (ref 8.5–10.1)
CANNABINOIDS UR QL SCN: NEGATIVE
CHLORIDE SERPL-SCNC: 103 MMOL/L (ref 97–108)
CHLORIDE SERPL-SCNC: 85 MMOL/L (ref 97–108)
CO2 SERPL-SCNC: 10 MMOL/L (ref 21–32)
CO2 SERPL-SCNC: 17 MMOL/L (ref 21–32)
COCAINE UR QL SCN: NEGATIVE
COLOR UR: ABNORMAL
CREAT SERPL-MCNC: 1.64 MG/DL (ref 0.55–1.02)
CREAT SERPL-MCNC: 2.16 MG/DL (ref 0.55–1.02)
D50 ADMINISTERED, D50ADM: 0 ML
D50 ORDER, D50ORD: 0 ML
DIFFERENTIAL METHOD BLD: ABNORMAL
DRUG SCRN COMMENT,DRGCM: ABNORMAL
EOSINOPHIL # BLD: 0 K/UL
EOSINOPHIL NFR BLD: 0 %
EPITH CASTS URNS QL MICRO: ABNORMAL /LPF
ERYTHROCYTE [DISTWIDTH] IN BLOOD BY AUTOMATED COUNT: 20.1 % (ref 11.5–14.5)
EST. AVERAGE GLUCOSE BLD GHB EST-MCNC: 209 MG/DL
ETHANOL SERPL-MCNC: <10 MG/DL
GLOBULIN SER CALC-MCNC: 4.3 G/DL (ref 2–4)
GLSCOM COMMENTS: NORMAL
GLUCOSE BLD STRIP.AUTO-MCNC: 106 MG/DL (ref 65–100)
GLUCOSE BLD STRIP.AUTO-MCNC: 117 MG/DL (ref 65–100)
GLUCOSE BLD STRIP.AUTO-MCNC: 141 MG/DL (ref 65–100)
GLUCOSE BLD STRIP.AUTO-MCNC: 147 MG/DL (ref 65–100)
GLUCOSE BLD STRIP.AUTO-MCNC: 172 MG/DL (ref 65–100)
GLUCOSE BLD STRIP.AUTO-MCNC: 186 MG/DL (ref 65–100)
GLUCOSE BLD STRIP.AUTO-MCNC: 265 MG/DL (ref 65–100)
GLUCOSE BLD STRIP.AUTO-MCNC: 459 MG/DL (ref 65–100)
GLUCOSE BLD STRIP.AUTO-MCNC: 463 MG/DL (ref 65–100)
GLUCOSE BLD STRIP.AUTO-MCNC: 471 MG/DL (ref 65–100)
GLUCOSE SERPL-MCNC: 435 MG/DL (ref 65–100)
GLUCOSE SERPL-MCNC: 92 MG/DL (ref 65–100)
GLUCOSE UR STRIP.AUTO-MCNC: 250 MG/DL
GLUCOSE, GLC: 106 MG/DL
GLUCOSE, GLC: 117 MG/DL
GLUCOSE, GLC: 141 MG/DL
GLUCOSE, GLC: 147 MG/DL
GLUCOSE, GLC: 172 MG/DL
GLUCOSE, GLC: 186 MG/DL
GLUCOSE, GLC: 265 MG/DL
GLUCOSE, GLC: 463 MG/DL
GLUCOSE, GLC: 471 MG/DL
HBA1C MFR BLD: 8.9 % (ref 4.2–6.3)
HCO3 BLDV-SCNC: 8 MMOL/L (ref 23–28)
HCT VFR BLD AUTO: 32.3 % (ref 35–47)
HGB BLD-MCNC: 10.5 G/DL (ref 11.5–16)
HGB UR QL STRIP: ABNORMAL
HIGH TARGET, HITG: 250 MG/DL
HYALINE CASTS URNS QL MICRO: ABNORMAL /LPF (ref 0–5)
INSULIN ADMINSTERED, INSADM: 0 UNITS/HOUR
INSULIN ADMINSTERED, INSADM: 0 UNITS/HOUR
INSULIN ADMINSTERED, INSADM: 0.1 UNITS/HOUR
INSULIN ADMINSTERED, INSADM: 0.5 UNITS/HOUR
INSULIN ADMINSTERED, INSADM: 1.6 UNITS/HOUR
INSULIN ADMINSTERED, INSADM: 12.1 UNITS/HOUR
INSULIN ADMINSTERED, INSADM: 3.4 UNITS/HOUR
INSULIN ADMINSTERED, INSADM: 6.2 UNITS/HOUR
INSULIN ADMINSTERED, INSADM: 8.2 UNITS/HOUR
INSULIN ORDER, INSORD: 0 UNITS/HOUR
INSULIN ORDER, INSORD: 0 UNITS/HOUR
INSULIN ORDER, INSORD: 0.1 UNITS/HOUR
INSULIN ORDER, INSORD: 0.5 UNITS/HOUR
INSULIN ORDER, INSORD: 1.6 UNITS/HOUR
INSULIN ORDER, INSORD: 12.1 UNITS/HOUR
INSULIN ORDER, INSORD: 3.4 UNITS/HOUR
INSULIN ORDER, INSORD: 6.2 UNITS/HOUR
INSULIN ORDER, INSORD: 8.2 UNITS/HOUR
KETONES UR QL STRIP.AUTO: 40 MG/DL
LACTATE SERPL-SCNC: 1.8 MMOL/L (ref 0.4–2)
LEUKOCYTE ESTERASE UR QL STRIP.AUTO: NEGATIVE
LOW TARGET, LOT: 150 MG/DL
LYMPHOCYTES # BLD: 2 K/UL
LYMPHOCYTES NFR BLD: 16 %
MAGNESIUM SERPL-MCNC: 2.8 MG/DL (ref 1.6–2.4)
MCH RBC QN AUTO: 32.5 PG (ref 26–34)
MCHC RBC AUTO-ENTMCNC: 32.5 G/DL (ref 30–36.5)
MCV RBC AUTO: 100 FL (ref 80–99)
METAMYELOCYTES NFR BLD MANUAL: 1 %
METHADONE UR QL: NEGATIVE
MINUTES UNTIL NEXT BG, NBG: 60 MIN
MONOCYTES # BLD: 1.1 K/UL
MONOCYTES NFR BLD: 9 %
MULTIPLIER, MUL: 0
MULTIPLIER, MUL: 0.01
MULTIPLIER, MUL: 0.02
MULTIPLIER, MUL: 0.02
MULTIPLIER, MUL: 0.03
MYELOCYTES NFR BLD MANUAL: 2 %
NEUTS BAND NFR BLD MANUAL: 5 %
NEUTS SEG # BLD: 8.7 K/UL
NEUTS SEG NFR BLD: 66 %
NITRITE UR QL STRIP.AUTO: NEGATIVE
NRBC # BLD: 0 K/UL (ref 0–0.01)
NRBC BLD-RTO: 0 PER 100 WBC
OPIATES UR QL: POSITIVE
ORDER INITIALS, ORDINIT: NORMAL
OSMOLALITY SERPL: 312 MOSM/KG H2O
PCO2 BLDV: 29 MMHG (ref 41–51)
PCP UR QL: NEGATIVE
PH BLDV: 7.05 [PH] (ref 7.32–7.42)
PH UR STRIP: 5 [PH] (ref 5–8)
PHOSPHATE SERPL-MCNC: 2.4 MG/DL (ref 2.6–4.7)
PLATELET # BLD AUTO: 296 K/UL (ref 150–400)
PO2 BLDV: 50 MMHG (ref 25–40)
POTASSIUM SERPL-SCNC: 4 MMOL/L (ref 3.5–5.1)
POTASSIUM SERPL-SCNC: 4.7 MMOL/L (ref 3.5–5.1)
PROMYELOCYTES NFR BLD MANUAL: 1 %
PROT SERPL-MCNC: 6.8 G/DL (ref 6.4–8.2)
PROT UR STRIP-MCNC: 30 MG/DL
RBC # BLD AUTO: 3.23 M/UL (ref 3.8–5.2)
RBC #/AREA URNS HPF: ABNORMAL /HPF (ref 0–5)
RBC MORPH BLD: ABNORMAL
RBC MORPH BLD: ABNORMAL
SALICYLATES SERPL-MCNC: 6.4 MG/DL (ref 2.8–20)
SAO2 % BLDV: 69 % (ref 65–88)
SAO2% DEVICE SAO2% SENSOR NAME: ABNORMAL
SERVICE CMNT-IMP: ABNORMAL
SODIUM SERPL-SCNC: 124 MMOL/L (ref 136–145)
SODIUM SERPL-SCNC: 136 MMOL/L (ref 136–145)
SP GR UR REFRACTOMETRY: 1.02 (ref 1–1.03)
SPECIMEN SITE: ABNORMAL
TROPONIN I SERPL-MCNC: <0.04 NG/ML
UROBILINOGEN UR QL STRIP.AUTO: 0.2 EU/DL (ref 0.2–1)
WBC # BLD AUTO: 12.2 K/UL (ref 3.6–11)
WBC MORPH BLD: ABNORMAL
WBC URNS QL MICRO: ABNORMAL /HPF (ref 0–4)

## 2017-11-20 PROCEDURE — 80307 DRUG TEST PRSMV CHEM ANLYZR: CPT | Performed by: INTERNAL MEDICINE

## 2017-11-20 PROCEDURE — 80307 DRUG TEST PRSMV CHEM ANLYZR: CPT | Performed by: EMERGENCY MEDICINE

## 2017-11-20 PROCEDURE — 80048 BASIC METABOLIC PNL TOTAL CA: CPT | Performed by: INTERNAL MEDICINE

## 2017-11-20 PROCEDURE — 77030011943

## 2017-11-20 PROCEDURE — 84100 ASSAY OF PHOSPHORUS: CPT | Performed by: INTERNAL MEDICINE

## 2017-11-20 PROCEDURE — 82140 ASSAY OF AMMONIA: CPT | Performed by: EMERGENCY MEDICINE

## 2017-11-20 PROCEDURE — 87040 BLOOD CULTURE FOR BACTERIA: CPT | Performed by: EMERGENCY MEDICINE

## 2017-11-20 PROCEDURE — 96365 THER/PROPH/DIAG IV INF INIT: CPT

## 2017-11-20 PROCEDURE — 85025 COMPLETE CBC W/AUTO DIFF WBC: CPT | Performed by: EMERGENCY MEDICINE

## 2017-11-20 PROCEDURE — 99285 EMERGENCY DEPT VISIT HI MDM: CPT

## 2017-11-20 PROCEDURE — 83880 ASSAY OF NATRIURETIC PEPTIDE: CPT | Performed by: EMERGENCY MEDICINE

## 2017-11-20 PROCEDURE — 70450 CT HEAD/BRAIN W/O DYE: CPT

## 2017-11-20 PROCEDURE — 83735 ASSAY OF MAGNESIUM: CPT | Performed by: INTERNAL MEDICINE

## 2017-11-20 PROCEDURE — 96375 TX/PRO/DX INJ NEW DRUG ADDON: CPT

## 2017-11-20 PROCEDURE — 83605 ASSAY OF LACTIC ACID: CPT | Performed by: EMERGENCY MEDICINE

## 2017-11-20 PROCEDURE — 82803 BLOOD GASES ANY COMBINATION: CPT | Performed by: EMERGENCY MEDICINE

## 2017-11-20 PROCEDURE — 82962 GLUCOSE BLOOD TEST: CPT

## 2017-11-20 PROCEDURE — 36415 COLL VENOUS BLD VENIPUNCTURE: CPT | Performed by: INTERNAL MEDICINE

## 2017-11-20 PROCEDURE — 74011250636 HC RX REV CODE- 250/636: Performed by: EMERGENCY MEDICINE

## 2017-11-20 PROCEDURE — 84484 ASSAY OF TROPONIN QUANT: CPT | Performed by: EMERGENCY MEDICINE

## 2017-11-20 PROCEDURE — 74011636637 HC RX REV CODE- 636/637: Performed by: EMERGENCY MEDICINE

## 2017-11-20 PROCEDURE — 74011000258 HC RX REV CODE- 258: Performed by: INTERNAL MEDICINE

## 2017-11-20 PROCEDURE — 80053 COMPREHEN METABOLIC PANEL: CPT | Performed by: EMERGENCY MEDICINE

## 2017-11-20 PROCEDURE — 96361 HYDRATE IV INFUSION ADD-ON: CPT

## 2017-11-20 PROCEDURE — 93005 ELECTROCARDIOGRAM TRACING: CPT

## 2017-11-20 PROCEDURE — 83930 ASSAY OF BLOOD OSMOLALITY: CPT | Performed by: EMERGENCY MEDICINE

## 2017-11-20 PROCEDURE — 74011250636 HC RX REV CODE- 250/636: Performed by: INTERNAL MEDICINE

## 2017-11-20 PROCEDURE — 83036 HEMOGLOBIN GLYCOSYLATED A1C: CPT | Performed by: EMERGENCY MEDICINE

## 2017-11-20 PROCEDURE — 96366 THER/PROPH/DIAG IV INF ADDON: CPT

## 2017-11-20 PROCEDURE — 74011000258 HC RX REV CODE- 258: Performed by: EMERGENCY MEDICINE

## 2017-11-20 PROCEDURE — 65610000006 HC RM INTENSIVE CARE

## 2017-11-20 PROCEDURE — 51798 US URINE CAPACITY MEASURE: CPT

## 2017-11-20 PROCEDURE — 71010 XR CHEST PORT: CPT

## 2017-11-20 PROCEDURE — 77030037878 HC DRSG MEPILEX >48IN BORD MOLN -B

## 2017-11-20 PROCEDURE — 81001 URINALYSIS AUTO W/SCOPE: CPT | Performed by: EMERGENCY MEDICINE

## 2017-11-20 RX ORDER — LORAZEPAM 2 MG/ML
1 INJECTION INTRAMUSCULAR ONCE
Status: COMPLETED | OUTPATIENT
Start: 2017-11-20 | End: 2017-11-20

## 2017-11-20 RX ORDER — SODIUM CHLORIDE 9 MG/ML
150 INJECTION, SOLUTION INTRAVENOUS CONTINUOUS
Status: DISCONTINUED | OUTPATIENT
Start: 2017-11-20 | End: 2017-11-20

## 2017-11-20 RX ORDER — VANCOMYCIN 1.75 GRAM/500 ML IN 0.9 % SODIUM CHLORIDE INTRAVENOUS
1750 ONCE
Status: COMPLETED | OUTPATIENT
Start: 2017-11-20 | End: 2017-11-21

## 2017-11-20 RX ORDER — LORAZEPAM 2 MG/ML
1 INJECTION INTRAMUSCULAR
Status: DISCONTINUED | OUTPATIENT
Start: 2017-11-20 | End: 2017-11-20

## 2017-11-20 RX ORDER — MAGNESIUM SULFATE 100 %
4 CRYSTALS MISCELLANEOUS AS NEEDED
Status: DISCONTINUED | OUTPATIENT
Start: 2017-11-20 | End: 2017-11-23 | Stop reason: SDUPTHER

## 2017-11-20 RX ORDER — DEXTROSE 50 % IN WATER (D50W) INTRAVENOUS SYRINGE
12.5-25 AS NEEDED
Status: DISCONTINUED | OUTPATIENT
Start: 2017-11-20 | End: 2017-11-23 | Stop reason: SDUPTHER

## 2017-11-20 RX ORDER — DEXTROSE 50 % IN WATER (D50W) INTRAVENOUS SYRINGE
12.5-25 AS NEEDED
Status: DISCONTINUED | OUTPATIENT
Start: 2017-11-20 | End: 2017-11-20

## 2017-11-20 RX ORDER — ACETAMINOPHEN 650 MG/1
650 SUPPOSITORY RECTAL
Status: DISCONTINUED | OUTPATIENT
Start: 2017-11-20 | End: 2017-11-24

## 2017-11-20 RX ORDER — SODIUM CHLORIDE AND POTASSIUM CHLORIDE .9; .15 G/100ML; G/100ML
SOLUTION INTRAVENOUS ONCE
Status: COMPLETED | OUTPATIENT
Start: 2017-11-20 | End: 2017-11-21

## 2017-11-20 RX ORDER — MUPIROCIN 20 MG/G
OINTMENT TOPICAL 2 TIMES DAILY
Status: DISPENSED | OUTPATIENT
Start: 2017-11-21 | End: 2017-11-25

## 2017-11-20 RX ORDER — MAGNESIUM SULFATE 100 %
4 CRYSTALS MISCELLANEOUS AS NEEDED
Status: DISCONTINUED | OUTPATIENT
Start: 2017-11-20 | End: 2017-11-20

## 2017-11-20 RX ORDER — SODIUM CHLORIDE 0.9 % (FLUSH) 0.9 %
5-10 SYRINGE (ML) INJECTION AS NEEDED
Status: DISCONTINUED | OUTPATIENT
Start: 2017-11-20 | End: 2017-11-26 | Stop reason: HOSPADM

## 2017-11-20 RX ORDER — DEXTROSE MONOHYDRATE AND SODIUM CHLORIDE 5; .9 G/100ML; G/100ML
100 INJECTION, SOLUTION INTRAVENOUS CONTINUOUS
Status: DISCONTINUED | OUTPATIENT
Start: 2017-11-20 | End: 2017-11-22

## 2017-11-20 RX ORDER — ONDANSETRON 2 MG/ML
4 INJECTION INTRAMUSCULAR; INTRAVENOUS
Status: DISCONTINUED | OUTPATIENT
Start: 2017-11-20 | End: 2017-11-26 | Stop reason: HOSPADM

## 2017-11-20 RX ADMIN — SODIUM CHLORIDE 2328 ML: 900 INJECTION, SOLUTION INTRAVENOUS at 12:39

## 2017-11-20 RX ADMIN — PIPERACILLIN SODIUM,TAZOBACTAM SODIUM 3.38 G: 3; .375 INJECTION, POWDER, FOR SOLUTION INTRAVENOUS at 15:26

## 2017-11-20 RX ADMIN — SODIUM CHLORIDE AND POTASSIUM CHLORIDE: 9; 1.49 INJECTION, SOLUTION INTRAVENOUS at 16:40

## 2017-11-20 RX ADMIN — SODIUM CHLORIDE 150 ML/HR: 900 INJECTION, SOLUTION INTRAVENOUS at 17:41

## 2017-11-20 RX ADMIN — SODIUM CHLORIDE 8.2 UNITS/HR: 900 INJECTION, SOLUTION INTRAVENOUS at 14:19

## 2017-11-20 RX ADMIN — DEXTROSE MONOHYDRATE AND SODIUM CHLORIDE 75 ML/HR: 5; .9 INJECTION, SOLUTION INTRAVENOUS at 21:00

## 2017-11-20 RX ADMIN — LORAZEPAM 1 MG: 2 INJECTION INTRAMUSCULAR; INTRAVENOUS at 15:15

## 2017-11-20 RX ADMIN — VANCOMYCIN HYDROCHLORIDE 1750 MG: 10 INJECTION, POWDER, LYOPHILIZED, FOR SOLUTION INTRAVENOUS at 16:12

## 2017-11-20 NOTE — PROGRESS NOTES
[facility] Consult Service Progress NOTE  Pulmonary, Critical Care, and Sleep Medicine    Name: Edson Olsen MRN: [id number]   : 1970 Hospital: [facility]   Date: 2017  Admission Date: 2017     Chart and notes reviewed. Data reviewed. Pt seen on rounds earlier today. I have evaluated and examined the patient. Pt is acutely ill. Reviewed the evaluation and will assist in implementing the plan as outlined by Dr. Fatimah Tracey and team    Hospital Day: 1    I was asked by Cornelius Modi MD to see Edson Olsen in consultation for a chief complaint of Acute encephalopathy    Shahana Garza is a 56 y.o.  female with Type 1 DM who presents with DKA. Patient was dropped off the ER by someone who said that they will just \"park their vehicle\" but they never came back. Patient found with multiple bruises on exam and labs consistent with DKA, DEBORAH and hyponatremia. Patient is not able to provide a history. Stat CT head negative. Pt was very agitated in the ER. Given dose of IV ativan and now somnolent in the CCU, unable to give meaningful ROS. IMPRESSION:   1. Acute metabolic encephalopathy. CT head negative. Likely metabolic- hyperosmolar state  2. Multiple bruises -forensic nurse eval on hold until patient's mental status improves and she can consent to exam.    3. Severe DKA in Type 1, uncontrolled  4. Possible sepsis due to PNA (? Aspiration)  5. DEBORAH  6. Hypotension- if pt does not respond to IVF , will need pressors and look for other etiologies  7. Volume depletion  8. Tobacco use  9. Anxiety and depression  10. Established problem of diabetes is worsening with threat/risk of bodily function  11. Pt is requiring Drug therapy requiring intensive monitoring for toxicity  12. Pt is critically ill and at high risk of end organ dysfunction and failure      RECOMMENDATIONS/PLAN:   1. ICU monitoring  2. continue volume resuscitation.   Follow UOP and Cr  3. IV insulin infusion. 4. Follow K and Phos closely-  5. Zosyn for possible aspiration PNA. 6. Follow up on cultures  7. Urine drug screen- had positive screen in 2015 but no details  8. Pt needs IV fluids with additives and Drug therapy requiring intensive monitoring for toxicity  9. Prescription drug management with home med reconciliation done  10. DVT, SUP prophylaxis. 11. Will be available to assist in medical management while in the CCU pending disposition     My assessment/management was discussed with:  Nursing XX    respiratory therapy Dr. ko      I have provided   30    minutes of critical care time rendering care exclusive of any procedures. During this entire length of time I was immediately available to the patient.      The reason for providing this level of medical care was due to a critical illness that impaired one or more vital organ systems, such that there was a high probability of imminent or life threatening deterioration in the patient's condition. Pt's condition is unstable and unpredictable. This care involved high complexity decision making which includes independently reviewing the patient's past medical records, current laboratory results, medication profiles that were immediately available to me and actual Xray images at the bedside in order to assess, support vital system function, and to treat this degree of vital organ system failure, and to prevent further life threatening deterioration of the patients condition. I was in direct communication with the nursing staff throughout this time. I have personally and independently reviewed the patients interval lab, diagnostic data, radiographs and the reports. I have ordered additional labs to follow the current medical conditions and to monitor treatment responses over the next 24 hours or sooner if needed. I will order additional imaging to follow longitudinal changes found on the most current imaging.      Risk of deterioration: high   [x] High complexity decision making was performed  [x] See my orders for details  Tubes:   Rankin  ICU disposition :Unchanged. Code: Full Code        Hemodynamics:    CO:    CI:    CVP:    SVR:   PAP Systolic:    PAP Diastolic:    PVR:    MB13:        Ventilator Settings:      Mode Rate TV Press PEEP FiO2 PIP Min. Vent                            Vital Signs: Intake/Output: Intake/Output:   Temp (24hrs), Av.6 °F (36.4 °C), Min:97.6 °F (36.4 °C), Max:97.6 °F (36.4 °C)   Visit Vitals    /63    Pulse (!) 113    Temp 97.6 °F (36.4 °C)    Resp 22    Wt 77.6 kg (171 lb)    SpO2 94%    BMI 28.9 kg/m2        Telemetry:    normal sinus rhythm   O2 Device: Room air     Wt Readings from Last 4 Encounters:   17 77.6 kg (171 lb)   17 78.9 kg (174 lb)   17 82.1 kg (181 lb 1.6 oz)   17 85.8 kg (189 lb 1.6 oz)        No intake or output data in the 24 hours ending 17 1710  Last shift:         Last 3 shifts:       Physical Exam:    General: severely ill   HEAD: Normocephalic, without obvious abnormality, atraumatic   EYES: conjuctivae clear. PERRL,  AN Icteric sclerae   NOSE: Nares normal;    THROAT:  mucous membranes dry , poor Oral hygiene; No Thrush; class 3 airway; Tongue midline   Neck: Supple, symmetrical, trachea midline, No accessory mm use, No Stridor/ cuff leak   LYMPH: No abnormally enlarged lymph nodes. Chest: normal   Lungs: normal air entry   Heart: Regular rate and rhythm   Abdomen: soft, non-tender, without masses or organomegaly   :      Rankin   Extremity: negative, clubbing    Neuro: lethargic   Psych: lethargic   Skin: Turgor absent;    Pulses:Bilateral, Radial, 2+   Capillary refill: abnormal:  sluggish capillary refill,    Tubes:   Rankin    DATA:    Interval lab and diagnostic data was reviewed. Interval radiology images were independently viewed and available reports were reviewed.         All lab results for the last 24 hours reviewed. MAR reviewed and pertinent medications noted or modified as needed  MEDS:   Current Facility-Administered Medications   Medication    sodium chloride (NS) flush 5-10 mL    insulin regular (NOVOLIN R, HUMULIN R) 100 Units in 0.9% sodium chloride 100 mL infusion    vancomycin (VANCOCIN) 1750 mg in  ml infusion    0.9% sodium chloride infusion    acetaminophen (TYLENOL) suppository 650 mg    ondansetron (ZOFRAN) injection 4 mg    glucose chewable tablet 16 g    dextrose (D50W) injection syrg 12.5-25 g    glucagon (GLUCAGEN) injection 1 mg    [START ON 11/21/2017] piperacillin-tazobactam (ZOSYN) 3.375 g in 0.9% sodium chloride (MBP/ADV) 100 mL        Labs:    Recent Labs      11/20/17   1206   WBC  12.2*   HGB  10.5*   PLT  296     Recent Labs      11/20/17   1206   NA  124*   K  4.7   CL  85*   CO2  10*   GLU  435*   BUN  63*   CREA  2.16*   CA  8.5   LAC  1.8   ALB  2.5*   SGOT  392*   ALT  152*     No results for input(s): PH, PCO2, PO2, HCO3, FIO2 in the last 72 hours.   Recent Labs      11/20/17   1206   TROIQ  <0.04     No results found for: BNPP, BNP   Lab Results   Component Value Date/Time    Culture result: NO GROWTH 2 DAYS 05/24/2017 04:30 PM    Culture result: NO GROWTH 1 DAY 11/07/2012 06:59 AM    Culture result: NO GROWTH 5 DAYS 11/06/2012 10:10 PM     No results found for: VANCT, CPK  Lab Results   Component Value Date/Time    Color YELLOW/STRAW 11/20/2017 12:34 PM    Appearance CLOUDY 11/20/2017 12:34 PM    pH (UA) 5.0 11/20/2017 12:34 PM    Protein 30 11/20/2017 12:34 PM    Glucose 250 11/20/2017 12:34 PM    Ketone 40 11/20/2017 12:34 PM    Bilirubin NEGATIVE  05/22/2010 05:15 PM    Blood LARGE 11/20/2017 12:34 PM    Urobilinogen 0.2 11/20/2017 12:34 PM    Nitrites NEGATIVE  11/20/2017 12:34 PM    Leukocyte Esterase NEGATIVE  11/20/2017 12:34 PM    WBC 0-4 11/20/2017 12:34 PM    RBC 0-5 11/20/2017 12:34 PM    Bacteria NEGATIVE  11/20/2017 12:34 PM       No results found for: IRON, FE, TIBC, IBCT, PSAT, FERR  Lab Results   Component Value Date/Time    TSH 4.01 11/08/2012 03:20 AM      Lab Results   Component Value Date/Time    Hepatitis B surface Ag <0.10 10/22/2014 11:30 AM       Imaging:      Results from [place] encounter on 11/20/17   XR CHEST PORT   Narrative EXAM:  XR CHEST PORT    INDICATION:  Sepsis    COMPARISON:  11/7/2012    FINDINGS:    A portable AP radiograph of the chest was obtained at 13:56 hours. The patient  is on a cardiac monitor. There is patchy bibasilar airspace disease and mild  vascular congestion. The pattern is more suggestive of pulmonary edema than of  pneumonia, although bilateral pneumonia cannot be excluded. There is no pleural  fluid. The cardiac silhouette is normal in size. The bones and soft tissues  are unremarkable. Impression IMPRESSION:     Bibasilar airspace disease and vascular congestion suggesting pulmonary edema. Different diagnosis of pneumonia. Results from [place] encounter on 11/20/17   CT HEAD WO CONT   Narrative EXAM:  CT HEAD WITHOUT CONTRAST  INDICATION: Trauma. COMPARISON: None. CONTRAST: None. TECHNIQUE: Unenhanced CT of the head was performed using 5 mm images. Brain and  bone windows were generated. Sagittal and coronal reformations were generated. CT dose reduction was achieved through use of a standardized protocol tailored  for this examination and automatic exposure control for dose modulation. CT dose  reduction was achieved through use of a standardized protocol tailored for this  examination and automatic exposure control for dose modulation. FINDINGS:  The ventricles and sulci are normal in size, shape and configuration and  midline. There is no significant white matter disease. There is no  intracranial hemorrhage. There is no extra-axial collection, mass, mass effect  or midline shift. The basilar cisterns are open. No acute infarct is  identified.  The bone windows demonstrate no abnormalities. The visualized  portions of the paranasal sinuses and mastoid air cells are clear. Impression IMPRESSION: Normal unenhanced CT examination of the head.             Brenana Reece MD

## 2017-11-20 NOTE — FORENSIC NURSE
FNE spoke to Rocio Espinosa RN who reports the patient presents in DKA and upon assessment RN noted multiple areas of skin discoloration, the patient unable to answer questions at this time. Unknown how skin discolorations occurred, RN will call FNE back when pt is coherent and able to answer safety questions.

## 2017-11-20 NOTE — ED NOTES
Shannon Emerson from forensics returned called and will follow pt. And asked to contact her after she becomes coherent or after admission.

## 2017-11-20 NOTE — IP AVS SNAPSHOT
[address] [facility] 
[phone] Patient: Derik Rosa MRN: [id number] You are allergic to the following Allergen Reactions Zocor (Simvastatin) Myalgia Lisinopril Cough Recent Documentation Weight Breastfeeding? BMI OB Status Smoking Status 76.6 kg No 28.99 kg/m2 Implant Current Every Day Smoker Unresulted Labs-Please follow up with your PCP about these lab tests Order Current Status CULTURE, BLOOD Preliminary result Emergency Contacts  (Rel.) Home Phone Work Phone Mobile Phone Chelsea Nayak (Friend) [phone] -- -- About your hospitalization You were admitted on:  November 20, 2017 You last received care in the:  [address] You were discharged on:  November 26, 2017 Why you were hospitalized Your primary diagnosis was:  Not on File Your diagnoses also included:  Dka (Diabetic Ketoacidoses) (Hcc), Anxiety And Depression, Essential Hypertension, Benign, Aspiration Pneumonia (Hcc) Providers Seen During Your Hospitalization Provider Specialty Primary office phone Wilton Ruiz DO Emergency Medicine [phone] Vadim Ramirez MD Hospitalist [phone] Your Primary Care Physician (PCP) Primary Care Physician Office Phone Office Fax Angelica CARMEN [phone] Follow-up Information Follow up With Details Comments Contact Info Toni Jackson DO   [address] [facility] [address] [facility] 
[phone] Royal Mckeon MD   [address] [place] [address] 2 [address] [facility] 
[phone] My Medications STOP taking these medications   
 atorvastatin 40 mg tablet Commonly known as:  LIPITOR HumaLOG 100 unit/mL injection Generic drug:  insulin lispro losartan 25 mg tablet Commonly known as:  COZAAR  
   
  
 PROTONIX 40 mg tablet Generic drug:  pantoprazole TAKE these medications as instructed Instructions Each Dose to Equal  
 Morning Noon Evening Bedtime  
 acetaminophen 325 mg tablet Commonly known as:  TYLENOL Your last dose was: Your next dose is: Take 2 Tabs by mouth every six (6) hours as needed. 650 mg  
    
   
   
   
  
 ALPRAZolam 0.5 mg tablet Commonly known as:  Eulalia Hearing Your last dose was: Your next dose is: Take 1 Tab by mouth three (3) times daily as needed for Anxiety. Max Daily Amount: 1.5 mg.  
 0.5 mg  
    
   
   
   
  
 amoxicillin-clavulanate 875-125 mg per tablet Commonly known as:  AUGMENTIN Your last dose was: Your next dose is: Take 1 Tab by mouth every twelve (12) hours for 5 days. 1 Tab  
    
   
   
   
  
 carvedilol 12.5 mg tablet Commonly known as:  Tita Evangeline Your last dose was: Your next dose is: Take 1 Tab by mouth two (2) times daily (with meals). 12.5 mg  
    
   
   
   
  
 chlorpheniramine-HYDROcodone 10-8 mg/5 mL suspension Commonly known as:  Aleks Mikaela Your last dose was: Your next dose is: Take 5 mL by mouth every twelve (12) hours as needed for Cough. Max Daily Amount: 10 mL. 5 mL  
    
   
   
   
  
 esomeprazole 40 mg capsule Commonly known as:  NexIUM Your last dose was: Your next dose is: Take 1 Cap by mouth daily. 40 mg  
    
   
   
   
  
 furosemide 20 mg tablet Commonly known as:  LASIX Your last dose was: Your next dose is: Take 1 Tab by mouth daily. 20 mg  
    
   
   
   
  
 gabapentin 300 mg capsule Commonly known as:  NEURONTIN Your last dose was: Your next dose is: Take 1 Cap by mouth three (3) times daily.   
 300 mg  
    
   
   
 glucagon 1 mg injection Commonly known as:  GLUCAGON EMERGENCY KIT (HUMAN) Your last dose was: Your next dose is:    
   
   
 Use as directed  
     
   
   
   
  
 guaiFENesin 100 mg/5 mL liquid Commonly known as:  ROBITUSSIN Your last dose was: Your next dose is: Take 20 mL by mouth three (3) times daily. 400 mg HYDROcodone-acetaminophen 7.5-325 mg per tablet Commonly known as:  Jose Robert Your last dose was: Your next dose is: Take 1 Tab by mouth every six (6) hours as needed. Max Daily Amount: 4 Tabs. 1 Tab  
    
   
   
   
  
 hydrocortisone 1 % topical cream  
Commonly known as:  CORTAID Your last dose was: Your next dose is:    
   
   
 Apply  to affected area two (2) times a day. use thin layer as directed  
     
   
   
   
  
 insulin aspart 100 unit/mL Inpn Commonly known as:  Yao Ahr Your last dose was: Your next dose is:    
   
   
 1:15 for carbs and 1:50 > 150 for correction. Max 50 units per day--may sub humalog or apidra if preferred  
     
   
   
   
  
 insulin glargine 100 unit/mL (3 mL) Inpn Commonly known as:  LANTUS SOLOSTAR Your last dose was: Your next dose is:    
   
   
 40 Units by SubCUTAneous route daily. 40 Units Insulin Needles (Disposable) 29 gauge x 1/2\" Ndle Commonly known as:  PEN NEEDLE Your last dose was: Your next dose is:    
   
   
 1 Each by Does Not Apply route Multiple. As directed 1 Each  
    
   
   
   
  
 magnesium oxide 400 mg tablet Commonly known as:  MAG-OX Your last dose was: Your next dose is: Take 1 Tab by mouth two (2) times a day. 400 mg  
    
   
   
   
  
 nicotine 21 mg/24 hr  
Commonly known as:  Jose Moreno Your last dose was: Your next dose is: 1 Patch by TransDERmal route every twenty-four (24) hours for 30 days. 1 Patch  
    
   
   
   
  
 potassium chloride 20 mEq tablet Commonly known as:  K-DUR, KLOR-CON Your last dose was: Your next dose is: Take 1 Tab by mouth daily. 20 mEq  
    
   
   
   
  
 promethazine 25 mg tablet Commonly known as:  PHENERGAN Your last dose was: Your next dose is: Take 1 Tab by mouth every six (6) hours as needed for Nausea. 25 mg  
    
   
   
   
  
 venlafaxine-SR 75 mg capsule Commonly known as:  EFFEXOR-XR Your last dose was: Your next dose is: Take 1 Cap by mouth daily (with breakfast). 75 mg Where to Get Your Medications These medications were sent to [address], [address] [facility], [address] [place] [address] Phone:  [phone]  
  insulin glargine 100 unit/mL (3 mL) In Information on where to get these meds will be given to you by the nurse or doctor. ! Ask your nurse or doctor about these medications  
  acetaminophen 325 mg tablet ALPRAZolam 0.5 mg tablet  
 amoxicillin-clavulanate 875-125 mg per tablet  
 carvedilol 12.5 mg tablet  
 chlorpheniramine-HYDROcodone 10-8 mg/5 mL suspension  
 furosemide 20 mg tablet  
 gabapentin 300 mg capsule  
 guaiFENesin 100 mg/5 mL liquid HYDROcodone-acetaminophen 7.5-325 mg per tablet  
 hydrocortisone 1 % topical cream  
 Insulin Needles (Disposable) 29 gauge x 1/2\" Ndle  
 magnesium oxide 400 mg tablet  
 nicotine 21 mg/24 hr  
 potassium chloride 20 mEq tablet  
 promethazine 25 mg tablet  
 venlafaxine-SR 75 mg capsule Discharge Instructions HOSPITALIST DISCHARGE INSTRUCTIONS 
NAME: Marjan Ankit :  1970 MRN:  [id number] Date/Time:  2017 8:20 AM 
 
ADMIT DATE: 2017 DISCHARGE DATE: 2017 DIAGNOSIS:  DKA, DM, HTN, Sepsis, Aspiration Pneumonia, DEBORAH, Hyponatremia, Encephalopathy, Hypokalemia, Hypomagnesemia MEDICATIONS: 
  
 
         As per medication reconciliation · It is important that you take the medication exactly as they are prescribed. · Keep your medication in the bottles provided by the pharmacist and keep a list of the medication names, dosages, and times to be taken in your wallet. · Do not take other medications without consulting your doctor. Pain Management: per above medications What to do at [facility] Recommended diet:  Cardiac Diet and Diabetic Diet Recommended activity: Activity as tolerated and No driving while on analgesics If you experience any of the following symptoms then please call your primary care physician or return to the emergency room if you cannot get hold of your doctor: 
Fever, chills, nausea, vomiting, diarrhea, change in mentation, falling, bleeding, shortness of breath. Follow Up: 
 PCP you are to call and set up an appointment to see them in 2 week. F/U Endocrinology Information obtained by : 
I understand that if any problems occur once I am at home I am to contact my physician. I understand and acknowledge receipt of the instructions indicated above. Physician's or R.N.'s Signature                                                                  Date/Time Patient or Representative Signature                                                          Date/Time Discharge Orders None [facility] Announcement  We are excited to announce that we are making your provider's discharge notes available to you in "[employer]". You will see these notes when they are completed and signed by the physician that discharged you from your recent hospital stay. If you have any questions or concerns about any information you see in "[employer]", please call the Health Information Department where you were seen or reach out to your Primary Care Provider for more information about your plan of care. Introducing \[facility]\"" & HEALTH SERVICES! Dear Solange Levin: 
Thank you for requesting a "[employer]" account. Our records indicate that you already have an active "[employer]" account. You can access your account anytime at [email or web address] Did you know that you can access your hospital and ER discharge instructions at any time in "[employer]"? You can also review all of your test results from your hospital stay or ER visit. Additional Information If you have questions, please visit the Frequently Asked Questions section of the "[employer]" website at [email or web address] Remember, "[employer]" is NOT to be used for urgent needs. For medical emergencies, dial 911. Now available from your iPhone and Android! General Information Please provide this summary of care documentation to your next provider. Patient Signature:  ____________________________________________________________ Date:  ____________________________________________________________  
  
Melissa Hester Provider Signature:  ____________________________________________________________ Date:  ____________________________________________________________

## 2017-11-20 NOTE — H&P
Hospitalist Admission Note    NAME: Juliet White   :  1970   MRN:  [id number]     Date/Time:  2017 4:20 PM    Patient PCP: Singh Kapoor III, DO  ________________________________________________________________________    My assessment of this patient's clinical condition and my plan of care is as follows. Assessment / Plan:    Severe DKA in Type 1, uncontrolled  Possible sepsis due to PNA (? Aspiration)  DEBORAH  Hypotension  Volume depletion  -appropriate for ICU (severe acidosis with AMS)  -continue volume resuscitation. Follow UOP and Cr  -start IV insulin infusion. Follow K and Phos closely  -continue Zosyn for possible aspiration PNA. Follow up on cultures  -check HgA1c    Acute encephalopathy  -due to above. CT head negative. Likely metabolic, re eval in AM    Multiple bruises  -forensic nurse eval on hold until patient's mental status improves and she can consent to exam.          Code Status:  Unable to discuss  Surrogate Decision Maker:  Unknown    DVT Prophylaxis:  SCD. Hold lovenox due to multiple bruising. GI Prophylaxis: not indicated    Baseline:  Unknown     I have provided    65    minutes of critical care time. During this entire length of time I was immediately available to the patient. The reason for providing this level of medical care was due to a critical illness that impaired one or more vital organ systems, such that there was a high probability of imminent or life threatening deterioration in the patient's condition. This care involved high complexity decision making which includes reviewing the patient's past medical records, current laboratory results, and actual Xray films in order to assess, support vital system function, and to treat this degree of vital organ system failure, and to prevent further life threatening deterioration of the patients condition.       I have also discussed this case with the involved ED physician Subjective:   CHIEF COMPLAINT:    Altered mental status    HISTORY OF PRESENT ILLNESS:     Adithya Aldana is a 56 y.o.  female with Type 1 DM who presents with DKA. Patient was dropped off the ER by someone who said that they will just \"park their vehicle\" but they never came back. Patient found with multiple bruises on exam and labs consistent with DKA, DEBORAH and hyponatremia. Patient is not able to provide a history. Stat CT head negative. We were asked to admit for work up and evaluation of the above problems.      Past Medical History:   Diagnosis Date    Achilles tendon rupture     along with torn right patellar/femoral ligament    Arthritis     rt. foot    Chronic pain     abdominal pain    Diabetes (HCC)     IDDM on insulin pump and sensor    DM type 1 (diabetes mellitus, type 1) (HCC)     age 25    Endometriosis     s/p ex-lap 4x    Gastric ulcer     GERD (gastroesophageal reflux disease)     Herpes     Other and unspecified hyperlipidemia     Panic attacks     Psychiatric disorder     anxiety, panic attacks    PTSD (post-traumatic stress disorder)     Unspecified essential hypertension         Past Surgical History:   Procedure Laterality Date    HX GYN      2 d&c's    HX GYN      laparoscopies x3    HX ORTHOPAEDIC  2002    achiles tendon repair,talus repair    HX ORTHOPAEDIC      injections x2 to right shoulder       Social History   Substance Use Topics    Smoking status: Current Every Day Smoker     Packs/day: 1.50     Years: 17.00    Smokeless tobacco: Never Used    Alcohol use Yes      Comment: a beer every few months        Family History   Problem Relation Age of Onset    Diabetes Mother      diet controlled    Diabetes Father     Heart Disease Paternal Uncle      MI in his 46s    Stroke Neg Hx      Allergies   Allergen Reactions    Zocor [Simvastatin] Myalgia    Lisinopril Cough        Prior to Admission medications    Medication Sig Start Date End Date Taking? Authorizing Provider   furosemide (LASIX) 40 mg tablet Take 40 mg by mouth daily. Historical Provider   insulin lispro (HUMALOG) 100 unit/mL injection by SubCUTAneous route. Sliding scale    Historical Provider   nicotine (NICODERM CQ) 21 mg/24 hr 1 Patch by TransDERmal route every twenty-four (24) hours for 30 days. 11/14/17 12/14/17  Lino Gonsalez III, DO   esomeprazole (NEXIUM) 40 mg capsule Take 1 Cap by mouth daily. 11/14/17   Champ Blum III, DO   promethazine (PHENERGAN) 25 mg tablet Take 1 Tab by mouth every six (6) hours as needed for Nausea. 11/14/17   Lino Gonsalez III, DO   venlafaxine-SR (EFFEXOR XR) 150 mg capsule Take 1 Cap by mouth two (2) times a day. 11/2/17   Corey Ashley MD   losartan (COZAAR) 25 mg tablet Take 1 Tab by mouth daily. 11/2/17   Corey Ashley MD   carvedilol (COREG) 12.5 mg tablet Take 1 Tab by mouth daily. 11/2/17   Corey Ashley MD   ALPRAZolam [place]) 0.5 mg tablet Take 1 Tab by mouth four (4) times daily. Max Daily Amount: 2 mg. 11/2/17   Corey Ashley MD   insulin glargine (LANTUS SOLOSTAR) 100 unit/mL (3 mL) inpn Inject 18 units before breakfast or as directed up to 50 units per day  Patient taking differently: Inject 14 units before breakfast or as directed up to 50 units per day 9/28/17   Corey Ashley MD   insulin aspart (NOVOLOG FLEXPEN) 100 unit/mL inpn 1:15 for carbs and 1:50 > 150 for correction. Max 50 units per day--may sub humalog or apidra if preferred 9/28/17   Corey Ashley MD   glucagon (GLUCAGON EMERGENCY KIT, HUMAN,) 1 mg injection Use as directed 9/28/17   Corey Ashley MD   atorvastatin (LIPITOR) 40 mg tablet Take 1 Tab by mouth daily. 9/28/17   Corey Ashley MD   gabapentin (NEURONTIN) 300 mg capsule Take 2 Caps by mouth three (3) times daily. 9/28/17   Corey Ashley MD   furosemide (LASIX) 20 mg tablet Take  by mouth daily.     Historical Provider   pantoprazole (PROTONIX) 40 mg tablet Take 40 mg by mouth daily. Historical Provider       REVIEW OF SYSTEMS:     I am not able to complete the review of systems because: The patient is intubated and sedated   x The patient has altered mental status due to his acute medical problems    The patient has baseline aphasia from prior stroke(s)    The patient has baseline dementia and is not reliable historian    The patient is in acute medical distress and unable to provide information           Objective:   VITALS:    Visit Vitals    BP 96/64    Pulse (!) 130    Temp 97.6 °F (36.4 °C)    Resp 22    Wt 77.6 kg (171 lb)    SpO2 (!) 74%    BMI 28.9 kg/m2       PHYSICAL EXAM:    General:    Lethargic, disoriented, appears stated age. HEENT: Anicteric sclerae, pink conjunctivae  Neck:  Supple, symmetrical,  thyroid: non tender  Lungs:   Diminished BS bases  Chest wall:  No tenderness  No Accessory muscle use. Heart:   Regular  rhythm,  Tachycardic, No  murmur   No edema  Abdomen:   Soft, non-tender. Not distended. Bowel sounds normal  Extremities: No cyanosis. No clubbing,  Skin turgor normal, Capillary refill normal, Radial dial pulse 2+  Skin:     Not pale. Not Jaundiced  (+) ecchymosis over LE (ankles to just above knee), (+) bruises over both buttocks, left lateral thigh, both knees. (+) abrasion / small skin tear right elbow area. Psych:  POOR insight.  + mildly agitated  Neurologic: EOMs intact. No facial asymmetry. Non verbal.  Symmetrical strength, Sensation grossly intact. Awake, does not follow commands.      _______________________________________________________________________  Care Plan discussed with:    Comments   Patient   AMS   Family      RN     Care Manager                    Consultant:      _______________________________________________________________________  Expected  Disposition:   Home with Family x   HH/PT/OT/RN    SNF/LTC    [facility] ________________________________________________________________________  TOTAL TIME:    Minutes    Critical Care Provided   72   Minutes non procedure based      Comments    x Reviewed previous records   >50% of visit spent in counseling and coordination of care  Discussion with patient and/or family and questions answered       Given the patient's current clinical presentation, I have a high level of concern for decompensation if discharged from the ED. Complex decision making was performed which includes reviewing the patient's available past medical records, laboratory results, and Xray films. I have also directly communicated my plan and discussed this case with the involved ED physician.     ____________________________________________________________________  Iesha Kendall MD    Procedures: see electronic medical records for all procedures/Xrays and details which were not copied into this note but were reviewed prior to creation of Plan.     LAB DATA REVIEWED:    Recent Results (from the past 24 hour(s))   GLUCOSE, POC    Collection Time: 11/20/17 11:36 AM   Result Value Ref Range    Glucose (POC) 459 (H) 65 - 100 mg/dL    Performed by David Gonzalez    EKG, 12 LEAD, INITIAL    Collection Time: 11/20/17 12:05 PM   Result Value Ref Range    Ventricular Rate 117 BPM    Atrial Rate 117 BPM    P-R Interval 186 ms    QRS Duration 78 ms    Q-T Interval 324 ms    QTC Calculation (Bezet) 451 ms    Calculated P Axis 49 degrees    Calculated R Axis 20 degrees    Calculated T Axis 50 degrees    Diagnosis       Sinus tachycardia  Possible Left atrial enlargement  Septal infarct , age undetermined  Abnormal ECG  No previous ECGs available     LACTIC ACID    Collection Time: 11/20/17 12:06 PM   Result Value Ref Range    Lactic acid 1.8 0.4 - 2.0 MMOL/L   METABOLIC PANEL, COMPREHENSIVE    Collection Time: 11/20/17 12:06 PM   Result Value Ref Range    Sodium 124 (L) 136 - 145 mmol/L    Potassium 4.7 3.5 - 5.1 mmol/L Chloride 85 (L) 97 - 108 mmol/L    CO2 10 (LL) 21 - 32 mmol/L    Anion gap 29 (H) 5 - 15 mmol/L    Glucose 435 (H) 65 - 100 mg/dL    BUN 63 (H) 6 - 20 MG/DL    Creatinine 2.16 (H) 0.55 - 1.02 MG/DL    BUN/Creatinine ratio 29 (H) 12 - 20      GFR est AA 30 (L) >60 ml/min/1.73m2    GFR est non-AA 25 (L) >60 ml/min/1.73m2    Calcium 8.5 8.5 - 10.1 MG/DL    Bilirubin, total 0.7 0.2 - 1.0 MG/DL    ALT (SGPT) 152 (H) 12 - 78 U/L    AST (SGOT) 392 (H) 15 - 37 U/L    Alk. phosphatase 151 (H) 45 - 117 U/L    Protein, total 6.8 6.4 - 8.2 g/dL    Albumin 2.5 (L) 3.5 - 5.0 g/dL    Globulin 4.3 (H) 2.0 - 4.0 g/dL    A-G Ratio 0.6 (L) 1.1 - 2.2     CBC WITH AUTOMATED DIFF    Collection Time: 11/20/17 12:06 PM   Result Value Ref Range    WBC 12.2 (H) 3.6 - 11.0 K/uL    RBC 3.23 (L) 3.80 - 5.20 M/uL    HGB 10.5 (L) 11.5 - 16.0 g/dL    HCT 32.3 (L) 35.0 - 47.0 %    .0 (H) 80.0 - 99.0 FL    MCH 32.5 26.0 - 34.0 PG    MCHC 32.5 30.0 - 36.5 g/dL    RDW 20.1 (H) 11.5 - 14.5 %    PLATELET 586 429 - 069 K/uL    NEUTROPHILS 66 %    BAND NEUTROPHILS 5 %    LYMPHOCYTES 16 %    MONOCYTES 9 %    EOSINOPHILS 0 %    BASOPHILS 0 %    METAMYELOCYTES 1 %    MYELOCYTES 2 %    PROMYELOCYTES 1 %    ABS. NEUTROPHILS 8.7 K/UL    ABS. LYMPHOCYTES 2.0 K/UL    ABS. MONOCYTES 1.1 K/UL    ABS. EOSINOPHILS 0.0 K/UL    ABS.  BASOPHILS 0.0 K/UL    RBC COMMENTS MACROCYTOSIS  PRESENT        RBC COMMENTS POLYCHROMASIA  PRESENT        WBC COMMENTS TOXIC GRANULATION      DF MANUAL     VENOUS BLOOD GAS    Collection Time: 11/20/17 12:06 PM   Result Value Ref Range    VENOUS PH 7.05 (LL) 7.32 - 7.42      VENOUS PCO2 29 (L) 41 - 51 mmHg    VENOUS PO2 50 (H) 25 - 40 mmHg    VENOUS O2 SATURATION 69 65 - 88 %    VENOUS BICARBONATE 8 (L) 23 - 28 mmol/L    VENOUS BASE DEFICIT 21.5 mmol/L    O2 METHOD ROOM AIR      Sample source VENOUS      SITE OTHER      Critical value read back lady peters RN    ETHYL ALCOHOL    Collection Time: 11/20/17 12:06 PM   Result Value Ref Range    ALCOHOL(ETHYL),SERUM <10 <10 MG/DL   ACETAMINOPHEN    Collection Time: 11/20/17 12:06 PM   Result Value Ref Range    Acetaminophen level <2 (L) 10 - 30 ug/mL   SALICYLATE    Collection Time: 11/20/17 12:06 PM   Result Value Ref Range    Salicylate level 6.4 2.8 - 20.0 MG/DL   TROPONIN I    Collection Time: 11/20/17 12:06 PM   Result Value Ref Range    Troponin-I, Qt. <0.04 <0.05 ng/mL   NUCLEATED RBC    Collection Time: 11/20/17 12:06 PM   Result Value Ref Range    NRBC 0.0 0  WBC    ABSOLUTE NRBC 0.00 0.00 - 0.01 K/uL   URINALYSIS W/ RFLX MICROSCOPIC    Collection Time: 11/20/17 12:34 PM   Result Value Ref Range    Color YELLOW/STRAW      Appearance CLOUDY (A) CLEAR      Specific gravity 1.019 1.003 - 1.030      pH (UA) 5.0 5.0 - 8.0      Protein 30 (A) NEG mg/dL    Glucose 250 (A) NEG mg/dL    Ketone 40 (A) NEG mg/dL    Blood LARGE (A) NEG      Urobilinogen 0.2 0.2 - 1.0 EU/dL    Nitrites NEGATIVE  NEG      Leukocyte Esterase NEGATIVE  NEG      WBC 0-4 0 - 4 /hpf    RBC 0-5 0 - 5 /hpf    Epithelial cells FEW FEW /lpf    Bacteria NEGATIVE  NEG /hpf    Amorphous Crystals FEW (A) NEG      Hyaline cast 0-2 0 - 5 /lpf   BILIRUBIN, CONFIRM    Collection Time: 11/20/17 12:34 PM   Result Value Ref Range    Bilirubin UA, confirm NEGATIVE  NEG     AMMONIA    Collection Time: 11/20/17 12:48 PM   Result Value Ref Range    Ammonia 22 <32 UMOL/L   GLUCOSE, POC    Collection Time: 11/20/17  1:53 PM   Result Value Ref Range    Glucose (POC) 471 (H) 65 - 100 mg/dL    Performed by Miguel Dominguez    Collection Time: 11/20/17  2:18 PM   Result Value Ref Range    Glucose 471 mg/dL    Insulin order 8.2 units/hour    Insulin adminstered 8.2 units/hour    Multiplier 0.020     Low target 150 mg/dL    High target 250 mg/dL    D50 order 0.0 ml    D50 administered 0.00 ml    Minutes until next BG 60 min    Order initials sb     Administered initials RE     GLSCOM Comments     GLUCOSE, POC    Collection Time: 11/20/17  3:21 PM   Result Value Ref Range    Glucose (POC) 463 (H) 65 - 100 mg/dL    Performed by Andrea Dickey    Collection Time: 11/20/17  3:29 PM   Result Value Ref Range    Glucose 463 mg/dL    Insulin order 12.1 units/hour    Insulin adminstered 12.1 units/hour    Multiplier 0.030     Low target 150 mg/dL    High target 250 mg/dL    D50 order 0.0 ml    D50 administered 0.00 ml    Minutes until next BG 60 min    Order initials sb     Administered initials sw     GLSCOM Comments

## 2017-11-20 NOTE — PROGRESS NOTES
Pharmacy Clarification of Prior to Admission Medication Regimen-Follow Up Needed    The patient was unable to participate in interview regarding clarification of the prior to admission medication regimen, due to AMS. Pharmacy attempted to clarify the prior to admission medication regimen for the patient, but was unsuccessful after multiple attempts. The following resources were used to facilitate this attempt:   Patient was unable to answer questions at the time MHT attempted to interview the patient.  Patient did not have any family members in the room at the time MHT attempted to interview the patient.  Patient does not have RX Query. The medication history will need to be re-evaluated at a later time during admission when patient is willing/able to participate or if more information is provided.     Thank you,  Sherice Fernandez CPhT  Medication History Pharmacy Technician

## 2017-11-20 NOTE — ED PROVIDER NOTES
[facility]  EMERGENCY DEPARTMENT HISTORY AND PHYSICAL EXAM       Date of Service: 11/20/2017   Patient Name: Poly Warren   YOB: 1970  Medical Record Number: [id number]    History of Presenting Illness     Chief Complaint   Patient presents with    Back Pain    High Blood Sugar     459        History Provided By:  family    Additional History:   Poly Warren is a 56 y.o. female with PMhx significant for DM, HTN, GERD, anxiety, and PTSD who presents ambulatory to the ED for evaluation of altered mental status and elevated blood glucose. Per nursing, family states pt fell recently and is currently taking pain medication. During hx family is not present in the room and unable to provide further details. Given the pt is altered, she is unable to specify any sx's with applicable location, quality, duration, severity, timing, context, associated sxs, additional context, or modifying factors. Social Hx: + Tobacco, + EtOH, - Illicit Drugs    History is limited at this time secondary to altered mental status.      Primary Care Provider: Sotero Braden III,      Past History     Past Medical History:   Past Medical History:   Diagnosis Date    Achilles tendon rupture     along with torn right patellar/femoral ligament    Arthritis     rt. foot    Chronic pain     abdominal pain    Diabetes (HCC)     IDDM on insulin pump and sensor    DM type 1 (diabetes mellitus, type 1) (HCC)     age 25    Endometriosis     s/p ex-lap 4x    Gastric ulcer     GERD (gastroesophageal reflux disease)     Herpes     Other and unspecified hyperlipidemia     Panic attacks     Psychiatric disorder     anxiety, panic attacks    PTSD (post-traumatic stress disorder)     Unspecified essential hypertension         Past Surgical History:   Past Surgical History:   Procedure Laterality Date    HX GYN      2 d&c's    HX GYN      laparoscopies x3    HX ORTHOPAEDIC  2002 achiles tendon repair,talus repair    HX ORTHOPAEDIC      injections x2 to right shoulder        Family History:   Family History   Problem Relation Age of Onset    Diabetes Mother      diet controlled    Diabetes Father     Heart Disease Paternal Uncle      MI in his 46s    Stroke Neg Hx         Social History:   Social History   Substance Use Topics    Smoking status: Current Every Day Smoker     Packs/day: 1.50     Years: 17.00    Smokeless tobacco: Never Used    Alcohol use Yes      Comment: a beer every few months        Allergies: Allergies   Allergen Reactions    Zocor [Simvastatin] Myalgia    Lisinopril Cough         Review of Systems   Review of Systems   Unable to perform ROS: Mental status change       Physical Exam  Physical Exam   Constitutional:   Pt disheveled and unkempt. Pt having troubling following commands trying to get out of exam bed. HENT:   Head: Normocephalic. Moist mucous membranes  No hematoma  Multiple abrasions to the face   Eyes: Conjunctivae are normal. Right eye exhibits no discharge. Left eye exhibits no discharge. Left eye deviated laterally. Gaze dysconjugate. Neck: Normal range of motion. Neck supple. No tracheal deviation present. Cardiovascular: Regular rhythm and normal heart sounds. Tachycardia present. No murmur heard. Pulmonary/Chest: Effort normal and breath sounds normal. No respiratory distress. She has no wheezes. She has no rales. Abdominal: Soft. Bowel sounds are normal. There is no tenderness. There is no rebound and no guarding. Musculoskeletal: Normal range of motion. She exhibits no edema or tenderness. Bruising to the back and right posterior chest wall   Neurological:   Alert to self   Skin: Skin is warm and dry. No rash noted. No erythema. Bruising to legs   Nursing note and vitals reviewed. Medical Decision Making   I am the first provider for this patient.      I reviewed the vital signs, available nursing notes, past medical history, past surgical history, family history and social history. Old Medical Records: Old medical records. Nursing notes. Provider Notes:     DDx: DKA, intracranial bleed, EtOH, substance abuse    Patient found to be in DKA. She is a febrile, with acute encephalopathy. Her CT head is negative. Her potassium was wnl, and insulin administration and IVF was started. Cxr read as chf vs pna. AP of the chest with sub optimal positioning makes interpretation difficult. Clinical picture is not consistent with CHF. PNA possible but again, this is not completely consistent with pna. Will cover with ABX fof broad infection. Disposition to the hospitalist for further work up and management. At this time will hold off on LP, as cause of encephalopathy likely due to DKA. Obtaining LP at this time would likely be dangerous to patient due to her inability to follow commands, and inability to lay still, we could pose a significant risk to the patient by trying to obtain LP. Critical Care Time:     CRITICAL CARE NOTE :    1:35 PM      IMPENDING DETERIORATION -CNS,Setabolic and Renal  ASSOCIATED RISK FACTORS - Dysrhythmia, Metabolic changes, Dehydration and CNS Decompensation  MANAGEMENT- Bedside Assessment and Supervision of Care  INTERPRETATION -  Blood Gases, ECG and Blood Pressure  INTERVENTIONS - hemodynamic mngmt, Neurologic interventions  and Metobolic interventions  CASE REVIEW - Hospitalist and Nursing  TREATMENT RESPONSE -Stable  PERFORMED BY - Self    NOTES   :    I have spent 55 minutes of critical care time involved in lab review, consultations with specialist, family decision- making, bedside attention and documentation. During this entire length of time I was immediately available to the patient . Nicolás Calabrese DO    ED Course:  11:44 AM  Initial assessment performed. Unable to discuss their presenting problems and plan of care due to altered mental status and no family in the room.  Will continue to monitor pt and will update family if they present. Progress Notes:     CONSULT NOTE:   1:52 PM  Melonie Gaucher, DO spoke with Rachid Pantoja MD   Specialty: Hospitalist  Discussed pt's hx, disposition, and available diagnostic and imaging results. Reviewed care plans. Consultant will evaluate pt for admission. Written by Mirlande Rodriguez ED Scribe, as dictated by Melonie Gaucher, DO.     Diagnostic Study Results     Labs -      Recent Results (from the past 12 hour(s))   GLUCOSE, POC    Collection Time: 11/20/17  7:46 PM   Result Value Ref Range    Glucose (POC) 106 (H) 65 - 100 mg/dL    Performed by Bailee Trotter    Collection Time: 11/20/17  7:46 PM   Result Value Ref Range    Glucose 106 mg/dL    Insulin order 0.5 units/hour    Insulin adminstered 0.5 units/hour    Multiplier 0.010     Low target 150 mg/dL    High target 250 mg/dL    D50 order 0.0 ml    D50 administered 0.00 ml    Minutes until next BG 60 min    Order initials wb     Administered initials wb     GLSCOM Comments     METABOLIC PANEL, BASIC    Collection Time: 11/20/17  8:25 PM   Result Value Ref Range    Sodium 136 136 - 145 mmol/L    Potassium 4.0 3.5 - 5.1 mmol/L    Chloride 103 97 - 108 mmol/L    CO2 17 (L) 21 - 32 mmol/L    Anion gap 16 (H) 5 - 15 mmol/L    Glucose 92 65 - 100 mg/dL    BUN 48 (H) 6 - 20 MG/DL    Creatinine 1.64 (H) 0.55 - 1.02 MG/DL    BUN/Creatinine ratio 29 (H) 12 - 20      GFR est AA 41 (L) >60 ml/min/1.73m2    GFR est non-AA 34 (L) >60 ml/min/1.73m2    Calcium 7.5 (L) 8.5 - 10.1 MG/DL   MAGNESIUM    Collection Time: 11/20/17  8:25 PM   Result Value Ref Range    Magnesium 2.8 (H) 1.6 - 2.4 mg/dL   PHOSPHORUS    Collection Time: 11/20/17  8:25 PM   Result Value Ref Range    Phosphorus 2.4 (L) 2.6 - 4.7 MG/DL   DRUG SCREEN, URINE    Collection Time: 11/20/17  9:06 PM   Result Value Ref Range    AMPHETAMINES NEGATIVE  NEG      BARBITURATES NEGATIVE  NEG      BENZODIAZEPINES POSITIVE (A) NEG      COCAINE NEGATIVE  NEG      METHADONE NEGATIVE  NEG      OPIATES POSITIVE (A) NEG      PCP(PHENCYCLIDINE) NEGATIVE  NEG      THC (TH-CANNABINOL) NEGATIVE  NEG      Drug screen comment (NOTE)    GLUCOSE, POC    Collection Time: 11/20/17  9:09 PM   Result Value Ref Range    Glucose (POC) 117 (H) 65 - 100 mg/dL    Performed by Duong Pineda    Collection Time: 11/20/17  9:10 PM   Result Value Ref Range    Glucose 117 mg/dL    Insulin order 0.0 units/hour    Insulin adminstered 0.0 units/hour    Multiplier 0.000     Low target 150 mg/dL    High target 250 mg/dL    D50 order 0.0 ml    D50 administered 0.00 ml    Minutes until next BG 60 min    Order initials wb     Administered initials wb     GLSCOM Comments     GLUCOSE, POC    Collection Time: 11/20/17 10:15 PM   Result Value Ref Range    Glucose (POC) 147 (H) 65 - 100 mg/dL    Performed by Duong Pineda    Collection Time: 11/20/17 10:16 PM   Result Value Ref Range    Glucose 147 mg/dL    Insulin order 0.0 units/hour    Insulin adminstered 0.0 units/hour    Multiplier 0.000     Low target 150 mg/dL    High target 250 mg/dL    D50 order 0.0 ml    D50 administered 0.00 ml    Minutes until next BG 60 min    Order initials wb     Administered initials wb     GLSCOM Comments     GLUCOSE, POC    Collection Time: 11/20/17 11:20 PM   Result Value Ref Range    Glucose (POC) 186 (H) 65 - 100 mg/dL    Performed by Jesus Alberto Boggs    Collection Time: 11/20/17 11:21 PM   Result Value Ref Range    Glucose 186 mg/dL    Insulin order 0.1 units/hour    Insulin adminstered 0.1 units/hour    Multiplier 0.000     Low target 150 mg/dL    High target 250 mg/dL    D50 order 0.0 ml    D50 administered 0.00 ml    Minutes until next BG 60 min    Order initials wb     Administered initials wb     GLSCOM Comments     GLUCOSE, POC    Collection Time: 11/21/17 12:28 AM   Result Value Ref Range    Glucose (POC) 196 (H) 65 - 100 mg/dL    Performed by Kip Spine    GLUCOSTABILIZER    Collection Time: 11/21/17 12:29 AM   Result Value Ref Range    Glucose 196 mg/dL    Insulin order 0.1 units/hour    Insulin adminstered 0.1 units/hour    Multiplier 0.000     Low target 150 mg/dL    High target 250 mg/dL    D50 order 0.0 ml    D50 administered 0.00 ml    Minutes until next BG 60 min    Order initials wb     Administered initials wb     GLSCOM Comments     GLUCOSE, POC    Collection Time: 11/21/17  1:52 AM   Result Value Ref Range    Glucose (POC) 225 (H) 65 - 100 mg/dL    Performed by Alex Lindo    Collection Time: 11/21/17  1:53 AM   Result Value Ref Range    Glucose 225 mg/dL    Insulin order 0.1 units/hour    Insulin adminstered 0.1 units/hour    Multiplier 0.000     Low target 150 mg/dL    High target 250 mg/dL    D50 order 0.0 ml    D50 administered 0.00 ml    Minutes until next BG 60 min    Order initials wb     Administered initials wb     GLSCOM Comments     GLUCOSE, POC    Collection Time: 11/21/17  3:04 AM   Result Value Ref Range    Glucose (POC) 247 (H) 65 - 100 mg/dL    Performed by Alex Lindo    Collection Time: 11/21/17  3:05 AM   Result Value Ref Range    Glucose 247 mg/dL    Insulin order 0.1 units/hour    Insulin adminstered 0.1 units/hour    Multiplier 0.000     Low target 150 mg/dL    High target 250 mg/dL    D50 order 0.0 ml    D50 administered 0.00 ml    Minutes until next BG 60 min    Order initials wb     Administered initials wb     GLSCOM Comments     GLUCOSE, POC    Collection Time: 11/21/17  5:04 AM   Result Value Ref Range    Glucose (POC) 324 (H) 65 - 100 mg/dL    Performed by Alex Lindo    Collection Time: 11/21/17  5:05 AM   Result Value Ref Range    Glucose 324 mg/dL    Insulin order 2.6 units/hour    Insulin adminstered 2.6 units/hour    Multiplier 0.010     Low target 150 mg/dL    High target 250 mg/dL    D50 order 0.0 ml    D50 administered 0.00 ml Minutes until next BG 60 min    Order initials wb     Administered initials wb     GLSCOM Comments     METABOLIC PANEL, BASIC    Collection Time: 11/21/17  5:18 AM   Result Value Ref Range    Sodium 141 136 - 145 mmol/L    Potassium 3.9 3.5 - 5.1 mmol/L    Chloride 108 97 - 108 mmol/L    CO2 12 (LL) 21 - 32 mmol/L    Anion gap 21 (H) 5 - 15 mmol/L    Glucose 288 (H) 65 - 100 mg/dL    BUN 30 (H) 6 - 20 MG/DL    Creatinine 1.49 (H) 0.55 - 1.02 MG/DL    BUN/Creatinine ratio 20 12 - 20      GFR est AA 46 (L) >60 ml/min/1.73m2    GFR est non-AA 38 (L) >60 ml/min/1.73m2    Calcium 7.9 (L) 8.5 - 10.1 MG/DL   MAGNESIUM    Collection Time: 11/21/17  5:18 AM   Result Value Ref Range    Magnesium 2.2 1.6 - 2.4 mg/dL   PHOSPHORUS    Collection Time: 11/21/17  5:18 AM   Result Value Ref Range    Phosphorus 2.5 (L) 2.6 - 4.7 MG/DL   GLUCOSE, POC    Collection Time: 11/21/17  6:30 AM   Result Value Ref Range    Glucose (POC) 345 (H) 65 - 100 mg/dL    Performed by Chanda Montero    Collection Time: 11/21/17  6:30 AM   Result Value Ref Range    Glucose 345 mg/dL    Insulin order 5.7 units/hour    Insulin adminstered 5.7 units/hour    Multiplier 0.020     Low target 150 mg/dL    High target 250 mg/dL    D50 order 0.0 ml    D50 administered 0.00 ml    Minutes until next BG 60 min    Order initials wb     Administered initials wb     GLSCOM Comments         Radiologic Studies -  The following have been ordered and reviewed:  XR CHEST PORT   Final Result      CT HEAD WO CONT   Final Result        CT Results  (Last 48 hours)               11/20/17 1306  CT HEAD WO CONT Final result    Impression:  IMPRESSION: Normal unenhanced CT examination of the head. Narrative:  EXAM:  CT HEAD WITHOUT CONTRAST   INDICATION: Trauma. COMPARISON: None. CONTRAST: None. TECHNIQUE: Unenhanced CT of the head was performed using 5 mm images. Brain and   bone windows were generated.  Sagittal and coronal reformations were generated. CT dose reduction was achieved through use of a standardized protocol tailored   for this examination and automatic exposure control for dose modulation. CT dose   reduction was achieved through use of a standardized protocol tailored for this   examination and automatic exposure control for dose modulation. FINDINGS:   The ventricles and sulci are normal in size, shape and configuration and   midline. There is no significant white matter disease. There is no   intracranial hemorrhage. There is no extra-axial collection, mass, mass effect   or midline shift. The basilar cisterns are open. No acute infarct is   identified. The bone windows demonstrate no abnormalities. The visualized   portions of the paranasal sinuses and mastoid air cells are clear. Vital Signs-Reviewed the patient's vital signs.    Patient Vitals for the past 12 hrs:   Temp Pulse Resp BP SpO2   11/21/17 0500 - (!) 120 26 145/63 99 %   11/21/17 0400 99.2 °F (37.3 °C) (!) 119 23 134/61 95 %   11/21/17 0300 - (!) 111 20 109/50 -   11/21/17 0100 - (!) 120 22 130/53 97 %   11/21/17 0001 - (!) 129 18 146/62 92 %   11/20/17 2300 - (!) 132 22 152/59 91 %   11/20/17 2200 - (!) 125 25 140/64 (!) 89 %   11/20/17 2100 - (!) 130 24 160/67 96 %   11/20/17 2000 99 °F (37.2 °C) (!) 130 21 145/52 96 %       Medications Given in the ED:  Medications   sodium chloride (NS) flush 5-10 mL (not administered)   insulin regular (NOVOLIN R, HUMULIN R) 100 Units in 0.9% sodium chloride 100 mL infusion (5.7 Units/hr IntraVENous Rate Change 11/21/17 0631)   acetaminophen (TYLENOL) suppository 650 mg (not administered)   ondansetron (ZOFRAN) injection 4 mg (not administered)   glucose chewable tablet 16 g (not administered)   dextrose (D50W) injection syrg 12.5-25 g (not administered)   glucagon (GLUCAGEN) injection 1 mg (not administered)   piperacillin-tazobactam (ZOSYN) 3.375 g in 0.9% sodium chloride (MBP/ADV) 100 mL (3.375 g IntraVENous New Bag 11/21/17 0000)   dextrose 5% and 0.9% NaCl infusion (75 mL/hr IntraVENous New Bag 11/20/17 2100)   mupirocin (BACTROBAN) 2 % ointment ( Both Nostrils Given 11/21/17 0000)   sodium chloride 0.9 % bolus infusion 2,328 mL (0 mL/kg × 77.6 kg IntraVENous IV Completed 11/20/17 1644)   0.9% sodium chloride with KCl 20 mEq/L infusion ( IntraVENous New Bag 11/20/17 1640)   piperacillin-tazobactam (ZOSYN) 3.375 g in 0.9% sodium chloride (MBP/ADV) 100 mL (0 g IntraVENous IV Completed 11/20/17 1610)   vancomycin (VANCOCIN) 1750 mg in  ml infusion (1,750 mg IntraVENous New Bag 11/20/17 1612)   LORazepam (ATIVAN) injection 1 mg (1 mg IntraVENous Given 11/20/17 1515)       Pulse Oximetry Analysis - Normal 99% on RA     Cardiac Monitor:   Rate: 116  Rhythm: Sinus Tachycardia     EKG interpretation: (Preliminary)  1205  Rhythm: sinus tachycardia; and regular . Rate (approx.): 117; Axis: normal; OR interval: normal; QRS interval: 78; QTc: 451; ST/T wave: normal; Other findings: no acute ischemic changes. Written by Keyana Chan ED Scribe, as dictated by Mery Kunz DO    Diagnosis   Clinical Impression:   1. Diabetic ketoacidosis without coma associated with type 1 diabetes mellitus ([facility] [address]    2. Encephalopathy acute    3. Acute renal failure, unspecified acute renal failure type ([facility] [address]         Plan:  1: Admit to hospitalist     Disposition Note:    ADMIT NOTE:  1:52 PM  The patient is being admitted to the hospital by Irene Mcdonald MD.  The results of their tests and reasons for their admission have been discussed with the patient and/or available family. They convey agreement and understanding for the need to be admitted and for their admission diagnosis. _______________________________   Attestations: This note is prepared by Keyana Chan, acting as Scribe for Mery Kunz DO.     Mery Kunz DO: The scribe's documentation has been prepared under my direction and personally reviewed by me in its entirety.  I confirm that the note above accurately reflects all work, treatment, procedures, and medical decision making performed by me.      _______________________________

## 2017-11-20 NOTE — PROGRESS NOTES
1700: Received verbal report from Alex Ro, [address] [place] [address].  8180: Patient arrived on unit, vitals stable. 1800: Assessment complete; altered mental status, drowsy, does not wake up to be able to assess any further, sinus tach, pulses palpable in all 4 extremities, blood pressures stable, lungs course, vitals stable, temp 99.1, does not appear to be in pain, skin intact, bruises noted to legs bilateral and buttocks. Labs sent. Primary Nurse Rm Anderson and ALVARO Hansen performed a dual skin assessment on this patient Impairment noted- see wound doc flow sheet  Joey score is 13  1845: Lab called with abnormal potassium and calcium levels, will re-draw labs. 1900: Gave bedside and verbal shift report to Elizabeth Platt RN.

## 2017-11-20 NOTE — PROGRESS NOTES
Pharmacy Automatic Renal Dosing Protocol - Antimicrobials    Indication for Antimicrobials: PNA     Current Regimen of Each Antimicrobial:  Zosyn 3.375g IV q6h (Start Date ; Day # 1)    Previous Antimicrobial Therapy:  Vanc 1750mg x 1 in ED     Significant Cultures:    Blood pending    Paralysis, amputations, malnutrition: none    Labs:  Recent Labs      17   1206   CREA  2.16*   BUN  63*   WBC  12.2*     Temp (24hrs), Av.6 °F (36.4 °C), Min:97.6 °F (36.4 °C), Max:97.6 °F (36.4 °C)      Creatinine Clearance (mL/min) or Dialysis: ~28  No results found for: PCT    Impression/Plan: Zosyn adjusted to 3.375g IV q8h extended dosing interval per hospital protocol     Pharmacy will follow daily and adjust medications as appropriate for renal function and/or serum levels. Thank you,  Kate Weber, ROYAD          Recommended duration of therapy  [email or web address]/Pharmacy/Clinical%[email or web address]%[email or web address]. docx    Renal Dosing  [email or web address]/Pharmacy/Clinical%20Companion/Renal%[id number]. pdf

## 2017-11-20 NOTE — ED NOTES
TRANSFER - OUT REPORT:    Verbal report given to ALVARO Brooks(name) on Pj Campos  being transferred to CCU(unit) for routine progression of care       Report consisted of patients Situation, Background, Assessment and   Recommendations(SBAR). Information from the following report(s) SBAR, Kardex, ED Summary, [facility] and Recent Results was reviewed with the receiving nurse. Lines:   Peripheral IV 11/20/17 Left Hand (Active)   Site Assessment Clean, dry, & intact 11/20/2017 12:23 PM   Phlebitis Assessment 0 11/20/2017 12:23 PM   Infiltration Assessment 0 11/20/2017 12:23 PM   Dressing Status Clean, dry, & intact 11/20/2017 12:23 PM       Peripheral IV 11/20/17 Left Antecubital (Active)   Site Assessment Clean, dry, & intact 11/20/2017 12:23 PM   Phlebitis Assessment 0 11/20/2017 12:23 PM   Infiltration Assessment 0 11/20/2017 12:23 PM   Dressing Status Clean, dry, & intact 11/20/2017 12:23 PM       Peripheral IV 11/20/17 Right Hand (Active)   Site Assessment Clean, dry, & intact 11/20/2017  3:36 PM   Phlebitis Assessment 0 11/20/2017  3:36 PM   Infiltration Assessment 0 11/20/2017  3:36 PM   Dressing Status Clean, dry, & intact 11/20/2017  3:36 PM   Dressing Type Transparent 11/20/2017  3:36 PM   Hub Color/Line Status Pink;Flushed 11/20/2017  3:36 PM   Action Taken Dressing reinforced 11/20/2017  3:36 PM        Opportunity for questions and clarification was provided.       Patient transported with:   Registered Nurse

## 2017-11-21 LAB
ADMINISTERED INITIALS, ADMINIT: NORMAL
AMMONIA PLAS-SCNC: 35 UMOL/L
ANION GAP SERPL CALC-SCNC: 13 MMOL/L (ref 5–15)
ANION GAP SERPL CALC-SCNC: 13 MMOL/L (ref 5–15)
ANION GAP SERPL CALC-SCNC: 21 MMOL/L (ref 5–15)
BUN SERPL-MCNC: 10 MG/DL (ref 6–20)
BUN SERPL-MCNC: 24 MG/DL (ref 6–20)
BUN SERPL-MCNC: 30 MG/DL (ref 6–20)
BUN/CREAT SERPL: 18 (ref 12–20)
BUN/CREAT SERPL: 20 (ref 12–20)
BUN/CREAT SERPL: 9 (ref 12–20)
CALCIUM SERPL-MCNC: 7.9 MG/DL (ref 8.5–10.1)
CALCIUM SERPL-MCNC: 7.9 MG/DL (ref 8.5–10.1)
CALCIUM SERPL-MCNC: 8.1 MG/DL (ref 8.5–10.1)
CHLORIDE SERPL-SCNC: 107 MMOL/L (ref 97–108)
CHLORIDE SERPL-SCNC: 108 MMOL/L (ref 97–108)
CHLORIDE SERPL-SCNC: 110 MMOL/L (ref 97–108)
CO2 SERPL-SCNC: 12 MMOL/L (ref 21–32)
CO2 SERPL-SCNC: 20 MMOL/L (ref 21–32)
CO2 SERPL-SCNC: 23 MMOL/L (ref 21–32)
CREAT SERPL-MCNC: 1.12 MG/DL (ref 0.55–1.02)
CREAT SERPL-MCNC: 1.33 MG/DL (ref 0.55–1.02)
CREAT SERPL-MCNC: 1.49 MG/DL (ref 0.55–1.02)
D50 ADMINISTERED, D50ADM: 0 ML
D50 ORDER, D50ORD: 0 ML
EST. AVERAGE GLUCOSE BLD GHB EST-MCNC: 206 MG/DL
GLSCOM COMMENTS: NORMAL
GLUCOSE BLD STRIP.AUTO-MCNC: 123 MG/DL (ref 65–100)
GLUCOSE BLD STRIP.AUTO-MCNC: 146 MG/DL (ref 65–100)
GLUCOSE BLD STRIP.AUTO-MCNC: 151 MG/DL (ref 65–100)
GLUCOSE BLD STRIP.AUTO-MCNC: 170 MG/DL (ref 65–100)
GLUCOSE BLD STRIP.AUTO-MCNC: 177 MG/DL (ref 65–100)
GLUCOSE BLD STRIP.AUTO-MCNC: 189 MG/DL (ref 65–100)
GLUCOSE BLD STRIP.AUTO-MCNC: 196 MG/DL (ref 65–100)
GLUCOSE BLD STRIP.AUTO-MCNC: 199 MG/DL (ref 65–100)
GLUCOSE BLD STRIP.AUTO-MCNC: 209 MG/DL (ref 65–100)
GLUCOSE BLD STRIP.AUTO-MCNC: 210 MG/DL (ref 65–100)
GLUCOSE BLD STRIP.AUTO-MCNC: 225 MG/DL (ref 65–100)
GLUCOSE BLD STRIP.AUTO-MCNC: 227 MG/DL (ref 65–100)
GLUCOSE BLD STRIP.AUTO-MCNC: 229 MG/DL (ref 65–100)
GLUCOSE BLD STRIP.AUTO-MCNC: 247 MG/DL (ref 65–100)
GLUCOSE BLD STRIP.AUTO-MCNC: 251 MG/DL (ref 65–100)
GLUCOSE BLD STRIP.AUTO-MCNC: 260 MG/DL (ref 65–100)
GLUCOSE BLD STRIP.AUTO-MCNC: 265 MG/DL (ref 65–100)
GLUCOSE BLD STRIP.AUTO-MCNC: 324 MG/DL (ref 65–100)
GLUCOSE BLD STRIP.AUTO-MCNC: 345 MG/DL (ref 65–100)
GLUCOSE SERPL-MCNC: 172 MG/DL (ref 65–100)
GLUCOSE SERPL-MCNC: 214 MG/DL (ref 65–100)
GLUCOSE SERPL-MCNC: 288 MG/DL (ref 65–100)
GLUCOSE, GLC: 123 MG/DL
GLUCOSE, GLC: 146 MG/DL
GLUCOSE, GLC: 151 MG/DL
GLUCOSE, GLC: 170 MG/DL
GLUCOSE, GLC: 177 MG/DL
GLUCOSE, GLC: 189 MG/DL
GLUCOSE, GLC: 196 MG/DL
GLUCOSE, GLC: 199 MG/DL
GLUCOSE, GLC: 209 MG/DL
GLUCOSE, GLC: 210 MG/DL
GLUCOSE, GLC: 225 MG/DL
GLUCOSE, GLC: 227 MG/DL
GLUCOSE, GLC: 229 MG/DL
GLUCOSE, GLC: 247 MG/DL
GLUCOSE, GLC: 251 MG/DL
GLUCOSE, GLC: 260 MG/DL
GLUCOSE, GLC: 265 MG/DL
GLUCOSE, GLC: 324 MG/DL
GLUCOSE, GLC: 345 MG/DL
HBA1C MFR BLD: 8.8 % (ref 4.2–6.3)
HIGH TARGET, HITG: 250 MG/DL
INSULIN ADMINSTERED, INSADM: 0.1 UNITS/HOUR
INSULIN ADMINSTERED, INSADM: 0.6 UNITS/HOUR
INSULIN ADMINSTERED, INSADM: 0.9 UNITS/HOUR
INSULIN ADMINSTERED, INSADM: 1.2 UNITS/HOUR
INSULIN ADMINSTERED, INSADM: 1.3 UNITS/HOUR
INSULIN ADMINSTERED, INSADM: 1.4 UNITS/HOUR
INSULIN ADMINSTERED, INSADM: 1.5 UNITS/HOUR
INSULIN ADMINSTERED, INSADM: 1.5 UNITS/HOUR
INSULIN ADMINSTERED, INSADM: 1.7 UNITS/HOUR
INSULIN ADMINSTERED, INSADM: 1.7 UNITS/HOUR
INSULIN ADMINSTERED, INSADM: 2 UNITS/HOUR
INSULIN ADMINSTERED, INSADM: 2.6 UNITS/HOUR
INSULIN ADMINSTERED, INSADM: 3.3 UNITS/HOUR
INSULIN ADMINSTERED, INSADM: 3.8 UNITS/HOUR
INSULIN ADMINSTERED, INSADM: 5 UNITS/HOUR
INSULIN ADMINSTERED, INSADM: 5.7 UNITS/HOUR
INSULIN ADMINSTERED, INSADM: 6.2 UNITS/HOUR
INSULIN ORDER, INSORD: 0.1 UNITS/HOUR
INSULIN ORDER, INSORD: 0.6 UNITS/HOUR
INSULIN ORDER, INSORD: 0.9 UNITS/HOUR
INSULIN ORDER, INSORD: 1.2 UNITS/HOUR
INSULIN ORDER, INSORD: 1.3 UNITS/HOUR
INSULIN ORDER, INSORD: 1.4 UNITS/HOUR
INSULIN ORDER, INSORD: 1.5 UNITS/HOUR
INSULIN ORDER, INSORD: 1.5 UNITS/HOUR
INSULIN ORDER, INSORD: 1.7 UNITS/HOUR
INSULIN ORDER, INSORD: 1.7 UNITS/HOUR
INSULIN ORDER, INSORD: 2 UNITS/HOUR
INSULIN ORDER, INSORD: 2.6 UNITS/HOUR
INSULIN ORDER, INSORD: 3.3 UNITS/HOUR
INSULIN ORDER, INSORD: 3.8 UNITS/HOUR
INSULIN ORDER, INSORD: 5 UNITS/HOUR
INSULIN ORDER, INSORD: 5.7 UNITS/HOUR
INSULIN ORDER, INSORD: 6.2 UNITS/HOUR
LOW TARGET, LOT: 150 MG/DL
MAGNESIUM SERPL-MCNC: 1.3 MG/DL (ref 1.6–2.4)
MAGNESIUM SERPL-MCNC: 1.9 MG/DL (ref 1.6–2.4)
MAGNESIUM SERPL-MCNC: 2.2 MG/DL (ref 1.6–2.4)
MINUTES UNTIL NEXT BG, NBG: 120 MIN
MINUTES UNTIL NEXT BG, NBG: 120 MIN
MINUTES UNTIL NEXT BG, NBG: 60 MIN
MULTIPLIER, MUL: 0
MULTIPLIER, MUL: 0.01
MULTIPLIER, MUL: 0.02
MULTIPLIER, MUL: 0.03
ORDER INITIALS, ORDINIT: NORMAL
PHOSPHATE SERPL-MCNC: 0.8 MG/DL (ref 2.6–4.7)
PHOSPHATE SERPL-MCNC: 1.8 MG/DL (ref 2.6–4.7)
PHOSPHATE SERPL-MCNC: 2.5 MG/DL (ref 2.6–4.7)
POTASSIUM SERPL-SCNC: 3.1 MMOL/L (ref 3.5–5.1)
POTASSIUM SERPL-SCNC: 3.5 MMOL/L (ref 3.5–5.1)
POTASSIUM SERPL-SCNC: 3.9 MMOL/L (ref 3.5–5.1)
SERVICE CMNT-IMP: ABNORMAL
SODIUM SERPL-SCNC: 141 MMOL/L (ref 136–145)
SODIUM SERPL-SCNC: 143 MMOL/L (ref 136–145)
SODIUM SERPL-SCNC: 143 MMOL/L (ref 136–145)

## 2017-11-21 PROCEDURE — 83036 HEMOGLOBIN GLYCOSYLATED A1C: CPT | Performed by: INTERNAL MEDICINE

## 2017-11-21 PROCEDURE — 74011250636 HC RX REV CODE- 250/636: Performed by: INTERNAL MEDICINE

## 2017-11-21 PROCEDURE — 83735 ASSAY OF MAGNESIUM: CPT | Performed by: INTERNAL MEDICINE

## 2017-11-21 PROCEDURE — 74011250637 HC RX REV CODE- 250/637: Performed by: INTERNAL MEDICINE

## 2017-11-21 PROCEDURE — 65610000006 HC RM INTENSIVE CARE

## 2017-11-21 PROCEDURE — 74011000258 HC RX REV CODE- 258: Performed by: EMERGENCY MEDICINE

## 2017-11-21 PROCEDURE — 36415 COLL VENOUS BLD VENIPUNCTURE: CPT | Performed by: INTERNAL MEDICINE

## 2017-11-21 PROCEDURE — 80048 BASIC METABOLIC PNL TOTAL CA: CPT | Performed by: INTERNAL MEDICINE

## 2017-11-21 PROCEDURE — 74011000258 HC RX REV CODE- 258: Performed by: INTERNAL MEDICINE

## 2017-11-21 PROCEDURE — 82140 ASSAY OF AMMONIA: CPT | Performed by: INTERNAL MEDICINE

## 2017-11-21 PROCEDURE — 77030032490 HC SLV COMPR SCD KNE COVD -B

## 2017-11-21 PROCEDURE — 74011636637 HC RX REV CODE- 636/637: Performed by: EMERGENCY MEDICINE

## 2017-11-21 PROCEDURE — 84100 ASSAY OF PHOSPHORUS: CPT | Performed by: INTERNAL MEDICINE

## 2017-11-21 PROCEDURE — 74011000250 HC RX REV CODE- 250: Performed by: INTERNAL MEDICINE

## 2017-11-21 PROCEDURE — 82962 GLUCOSE BLOOD TEST: CPT

## 2017-11-21 RX ORDER — IPRATROPIUM BROMIDE AND ALBUTEROL SULFATE 2.5; .5 MG/3ML; MG/3ML
3 SOLUTION RESPIRATORY (INHALATION)
Status: DISCONTINUED | OUTPATIENT
Start: 2017-11-21 | End: 2017-11-22

## 2017-11-21 RX ORDER — IPRATROPIUM BROMIDE AND ALBUTEROL SULFATE 2.5; .5 MG/3ML; MG/3ML
3 SOLUTION RESPIRATORY (INHALATION)
Status: DISCONTINUED | OUTPATIENT
Start: 2017-11-21 | End: 2017-11-21

## 2017-11-21 RX ORDER — POTASSIUM CHLORIDE 7.45 MG/ML
10 INJECTION INTRAVENOUS
Status: DISCONTINUED | OUTPATIENT
Start: 2017-11-21 | End: 2017-11-21

## 2017-11-21 RX ORDER — LABETALOL HYDROCHLORIDE 5 MG/ML
10 INJECTION, SOLUTION INTRAVENOUS
Status: DISCONTINUED | OUTPATIENT
Start: 2017-11-21 | End: 2017-11-26 | Stop reason: HOSPADM

## 2017-11-21 RX ORDER — VANCOMYCIN HYDROCHLORIDE
1250
Status: DISCONTINUED | OUTPATIENT
Start: 2017-11-21 | End: 2017-11-22

## 2017-11-21 RX ORDER — LABETALOL HCL 20 MG/4 ML
10 SYRINGE (ML) INTRAVENOUS
Status: DISCONTINUED | OUTPATIENT
Start: 2017-11-21 | End: 2017-11-21

## 2017-11-21 RX ORDER — POTASSIUM CHLORIDE 7.45 MG/ML
10 INJECTION INTRAVENOUS
Status: COMPLETED | OUTPATIENT
Start: 2017-11-21 | End: 2017-11-21

## 2017-11-21 RX ORDER — LORAZEPAM 2 MG/ML
0.5 INJECTION INTRAMUSCULAR
Status: DISCONTINUED | OUTPATIENT
Start: 2017-11-21 | End: 2017-11-24

## 2017-11-21 RX ORDER — ENOXAPARIN SODIUM 100 MG/ML
40 INJECTION SUBCUTANEOUS EVERY 24 HOURS
Status: DISCONTINUED | OUTPATIENT
Start: 2017-11-21 | End: 2017-11-26 | Stop reason: HOSPADM

## 2017-11-21 RX ADMIN — POTASSIUM CHLORIDE 10 MEQ: 10 INJECTION, SOLUTION INTRAVENOUS at 13:24

## 2017-11-21 RX ADMIN — MUPIROCIN: 20 OINTMENT TOPICAL at 00:00

## 2017-11-21 RX ADMIN — POTASSIUM CHLORIDE 10 MEQ: 10 INJECTION, SOLUTION INTRAVENOUS at 14:32

## 2017-11-21 RX ADMIN — MUPIROCIN: 20 OINTMENT TOPICAL at 10:36

## 2017-11-21 RX ADMIN — POTASSIUM CHLORIDE 10 MEQ: 10 INJECTION, SOLUTION INTRAVENOUS at 12:23

## 2017-11-21 RX ADMIN — PIPERACILLIN SODIUM,TAZOBACTAM SODIUM 3.38 G: 3; .375 INJECTION, POWDER, FOR SOLUTION INTRAVENOUS at 15:33

## 2017-11-21 RX ADMIN — DEXTROSE MONOHYDRATE AND SODIUM CHLORIDE 75 ML/HR: 5; .9 INJECTION, SOLUTION INTRAVENOUS at 11:16

## 2017-11-21 RX ADMIN — VANCOMYCIN HYDROCHLORIDE 1250 MG: 10 INJECTION, POWDER, LYOPHILIZED, FOR SOLUTION INTRAVENOUS at 15:37

## 2017-11-21 RX ADMIN — POTASSIUM CHLORIDE 10 MEQ: 10 INJECTION, SOLUTION INTRAVENOUS at 11:13

## 2017-11-21 RX ADMIN — SODIUM CHLORIDE 1.4 UNITS/HR: 900 INJECTION, SOLUTION INTRAVENOUS at 16:33

## 2017-11-21 RX ADMIN — ENOXAPARIN SODIUM 40 MG: 40 INJECTION SUBCUTANEOUS at 10:56

## 2017-11-21 RX ADMIN — POTASSIUM PHOSPHATE, MONOBASIC AND POTASSIUM PHOSPHATE, DIBASIC: 224; 236 INJECTION, SOLUTION INTRAVENOUS at 12:22

## 2017-11-21 RX ADMIN — SODIUM CHLORIDE 1.7 UNITS/HR: 900 INJECTION, SOLUTION INTRAVENOUS at 11:11

## 2017-11-21 RX ADMIN — PIPERACILLIN SODIUM,TAZOBACTAM SODIUM 3.38 G: 3; .375 INJECTION, POWDER, FOR SOLUTION INTRAVENOUS at 00:00

## 2017-11-21 RX ADMIN — POTASSIUM PHOSPHATE, MONOBASIC AND POTASSIUM PHOSPHATE, DIBASIC: 224; 236 INJECTION, SOLUTION INTRAVENOUS at 17:01

## 2017-11-21 RX ADMIN — PIPERACILLIN SODIUM,TAZOBACTAM SODIUM 3.38 G: 3; .375 INJECTION, POWDER, FOR SOLUTION INTRAVENOUS at 07:47

## 2017-11-21 RX ADMIN — MUPIROCIN: 20 OINTMENT TOPICAL at 17:26

## 2017-11-21 RX ADMIN — LORAZEPAM 0.5 MG: 2 INJECTION INTRAMUSCULAR; INTRAVENOUS at 21:45

## 2017-11-21 NOTE — PROGRESS NOTES
Interdisciplinary team rounds were held on 11/21/2017  with the following team members:Care Management, Diabetes Treatment Specialist, Nursing, Nutrition, Pharmacy, Physical Therapy and Physician. Plan of care discussed. Goal: See MD orders and progress notes for further  interventions and desired outcomes.

## 2017-11-21 NOTE — PROGRESS NOTES
Pharmacy Automatic Renal Dosing Protocol - Antimicrobials    Indication for Antimicrobials: Aspiration PNA     Current Regimen of Each Antimicrobial:  Piperacillin-tazobactam 3.375 gm IV every hours (Started 17; Day #2)  Vancomycin dosed by pharmacy (Started 17; Day #2)    Previous Antimicrobial Therapy:  Vanc 1750mg x 1 in ED     Goal Vancomycin Trough: 15-20 mcg/mL    Significant Cultures:   17 Blood culture = GPC in pairs and clusters in 1/2 (Results pending)  17 Blood culture = No growth x 19 hours (Results pending)    Labs:  Recent Labs      17   0922  17   0518  17   1206   CREA  1.33*  1.49*  1.64*  2.16*   BUN  24*  30*  48*  63*   WBC   --    --    --   12.2*     Temp (24hrs), Av.6 °F (37 °C), Min:97.6 °F (36.4 °C), Max:99.2 °F (37.3 °C)    Creatinine Clearance (mL/min) or Dialysis: Greater than 50 mL/min    No results found for: PCT    Impression/Plan:   - Piperacillin-tazobactam dosed appropriately based on indication and renal function. Continue current regimen. - Vancomycin loading dose of 1750 mg IV once administered on 17 at 1612. Vancomycin maintenance dose of 1250 mg IV every 18 hours ordered to start now. Pharmacy will follow daily and adjust medications as appropriate for renal function and/or serum levels.     Thank you,  Lauren Muñoz, PHARMD

## 2017-11-21 NOTE — PROGRESS NOTES
Hospitalist Progress Note    NAME: Arianna Marrero   :  1970   MRN:  [id number]       Assessment / Plan:  Severe DKA in Type 1, uncontrolled: c/w Insulin drip, anion gap has not closed yet, HbA1C 8.8. C/w IVF. Possible sepsis due to PNA (? Aspiration) c/w IVF and ABX, BC shows GPC, repeat BC on aM. Aspiration PNA: c/w Zosyn, Vancomycin, bronchodilators, check sputum. DEBORAH: improving, monitor, c/w IVF. Hypotension: BP had improved, monitor. Volume depletion: still behind in fluids, c/w IVF and monitor. Acute encephalopathy due to above. CT head negative. Likely metabolic, still confused, monitor, check ammonia. Multiple bruises forensic nurse eval on hold until patient's mental status improves and she can consent to exam.    Hypokalemia: replace and monitor. Surrogate Decision Maker:  Unknown  Baseline:  Unknown   Body mass index is 28.65 kg/(m^2). Code status: Full for now  Prophylaxis: lovenox  Recommended Disposition: SNF/LTC and  PT, OT, RN     Subjective:     Chief Complaint / Reason for Physician Visit  Confused unable to provide a meaningful history. Discussed with RN events overnight. Review of Systems:  Symptom Y/N Comments  Symptom Y/N Comments   Fever/Chills    Chest Pain     Poor Appetite    Edema     Cough    Abdominal Pain     Sputum    Joint Pain     SOB/HERNÁNDEZ    Pruritis/Rash     Nausea/vomit    Tolerating PT/OT     Diarrhea    Tolerating Diet     Constipation    Other       Could NOT obtain due to: Confused     Objective:     VITALS:   Last 24hrs VS reviewed since prior progress note.  Most recent are:  Patient Vitals for the past 24 hrs:   Temp Pulse Resp BP SpO2   17 1400 - (!) 127 26 (!) 147/98 100 %   17 1300 - (!) 121 28 131/84 94 %   17 1200 98.1 °F (36.7 °C) (!) 114 24 169/75 100 %   17 1100 - (!) 125 22 159/63 95 %   17 1000 - (!) 135 23 162/87 96 %   17 0900 - (!) 122 27 153/76 96 %   17 0800 98.5 °F (36.9 °C) (!) 108 25 139/64 95 %   11/21/17 0747 - - - - 94 %   11/21/17 0700 - (!) 111 24 117/59 96 %   11/21/17 0500 - (!) 120 26 145/63 99 %   11/21/17 0400 99.2 °F (37.3 °C) (!) 119 23 134/61 95 %   11/21/17 0300 - (!) 111 20 109/50 -   11/21/17 0100 - (!) 120 22 130/53 97 %   11/21/17 0001 - (!) 129 18 146/62 92 %   11/20/17 2300 - (!) 132 22 152/59 91 %   11/20/17 2200 - (!) 125 25 140/64 (!) 89 %   11/20/17 2100 - (!) 130 24 160/67 96 %   11/20/17 2000 99 °F (37.2 °C) (!) 130 21 145/52 96 %   11/20/17 1800 - (!) 107 22 - 97 %   11/20/17 1728 99.1 °F (37.3 °C) (!) 116 22 118/58 96 %   11/20/17 1645 - (!) 113 22 117/63 94 %   11/20/17 1630 - (!) 122 24 139/76 91 %   11/20/17 1628 97.6 °F (36.4 °C) - - - -   11/20/17 1600 - (!) 119 21 124/63 96 %   11/20/17 1530 - (!) 144 21 (!) 130/97 (!) 69 %   11/20/17 1500 - (!) 135 25 120/63 96 %       Intake/Output Summary (Last 24 hours) at 11/21/17 1447  Last data filed at 11/21/17 1422   Gross per 24 hour   Intake           2220.5 ml   Output             5125 ml   Net          -2904.5 ml        PHYSICAL EXAM:  General: WD, WN. Alert, confused, no acute distress    EENT:  EOMI. Anicteric sclerae. MMM  Resp:  Coarse BS, mild crackles in bases  CV:  Regular  rhythm,  No edema  GI:  Soft, Non distended, Non tender.  +Bowel sounds  Neurologic:  Alert and oriented X 1, normal speech,   Psych:   Poor  insight. Not anxious nor agitated  Skin:  No rashes.   No jaundice    Reviewed most current lab test results and cultures  YES  Reviewed most current radiology test results   YES  Review and summation of old records today    NO  Reviewed patient's current orders and MAR    YES  PMH/SH reviewed - no change compared to H&P  ________________________________________________________________________  Care Plan discussed with:    Comments   Patient y    Family      RN y    Care Manager     Consultant                        Multidiciplinary team rounds were held today with , nursing, pharmacist and clinical coordinator. Patient's plan of care was discussed; medications were reviewed and discharge planning was addressed. ________________________________________________________________________  Total NON critical care TIME:     Minutes    Total CRITICAL CARE TIME Spent: 60  Minutes non procedure based      Comments   >50% of visit spent in counseling and coordination of care y    ________________________________________________________________________  Venkatesh Grajeda MD     Procedures: see electronic medical records for all procedures/Xrays and details which were not copied into this note but were reviewed prior to creation of Plan. LABS:  I reviewed today's most current labs and imaging studies.   Pertinent labs include:  Recent Labs      11/20/17   1206   WBC  12.2*   HGB  10.5*   HCT  32.3*   PLT  296     Recent Labs      11/21/17   0922  11/21/17 0518  11/20/17 2025 11/20/17   1206   NA  143  141  136  124*   K  3.1*  3.9  4.0  4.7   CL  110*  108  103  85*   CO2  20*  12*  17*  10*   GLU  172*  288*  92  435*   BUN  24*  30*  48*  63*   CREA  1.33*  1.49*  1.64*  2.16*   CA  8.1*  7.9*  7.5*  8.5   MG  1.9  2.2  2.8*   --    PHOS  0.8*  2.5*  2.4*   --    ALB   --    --    --   2.5*   TBILI   --    --    --   0.7   SGOT   --    --    --   392*   ALT   --    --    --   152*       Signed: Venkatesh Grajeda MD

## 2017-11-21 NOTE — PROGRESS NOTES
[facility] Consult Service Progress NOTE  Pulmonary, Critical Care, and Sleep Medicine    Name: Corrie Wu MRN: [id number]   : 1970 Hospital: [facility]   Date: 2017  Admission Date: 2017     Chart and notes reviewed. Data reviewed. Pt seen on rounds earlier today. I have evaluated and examined the patient. Pt is acutely ill. Reviewed the evaluation and will assist in implementing the plan as outlined by Dr. Tani Parham and team    Hospital Day: 2    I was asked by Celia Renteria MD to see Corrie Wu in consultation for a chief complaint of Acute encephalopathy    Kamala Leal is a 56 y.o.  female with Type 1 DM who presents with DKA. Patient was dropped off the ER by someone who said that they will just \"park their vehicle\" but they never came back. Patient found with multiple bruises on exam and labs consistent with DKA, DEBORAH and hyponatremia. Patient is not able to provide a history. Stat CT head negative. Pt was very agitated in the ER. Given dose of IV ativan and now somnolent in the CCU, unable to give meaningful ROS.  still hypersonolent but no distress. Patient is drowsy but responds to voice. Does not always follow commands      IMPRESSION:   1. Acute metabolic encephalopathy. CT head negative. Likely metabolic- hyperosmolar state  2. Multiple bruises -forensic nurse eval on hold until patient's mental status improves and she can consent to exam.    3. Urine drug screen noted- had benzos; chronic pain  4. Severe DKA in Type 1, uncontrolled- on insulin drip. GAP has NOT closed yet  5. Possible sepsis due to PNA (? Aspiration)  6. DEBORAH  7. Hypotension- if pt does not respond to IVF , will need pressors and look for other etiologies  8. Volume depletion  9. Tobacco use  10. Anxiety and depression  11. Established problem of diabetes is worsening with threat/risk of bodily function  12.  Pt is requiring Drug therapy requiring intensive monitoring for toxicity  13. Pt is critically ill and at high risk of end organ dysfunction and failure      RECOMMENDATIONS/PLAN:   1. ICU monitoring  2. continue volume resuscitation. Follow UOP and Cr  3. IV insulin infusion until gap closes x 2 BMP  4. Follow K and Phos closely-  5. Zosyn for possible aspiration PNA. 6. Follow up on cultures  7. Urine drug screen- had positive screen in 2015 but no details  8. Pt needs IV fluids with additives and Drug therapy requiring intensive monitoring for toxicity  9. Prescription drug management with home med reconciliation done  10. DVT, SUP prophylaxis. 11. Will be available to assist in medical management while in the CCU pending disposition     My assessment/management was discussed with:  Nursing XX    respiratory therapy Dr. ko      I have provided   30    minutes of critical care time rendering care exclusive of any procedures. During this entire length of time I was immediately available to the patient.      The reason for providing this level of medical care was due to a critical illness that impaired one or more vital organ systems, such that there was a high probability of imminent or life threatening deterioration in the patient's condition. Pt's condition is unstable and unpredictable. This care involved high complexity decision making which includes independently reviewing the patient's past medical records, current laboratory results, medication profiles that were immediately available to me and actual Xray images at the bedside in order to assess, support vital system function, and to treat this degree of vital organ system failure, and to prevent further life threatening deterioration of the patients condition. I was in direct communication with the nursing staff throughout this time. I have personally and independently reviewed the patients interval lab, diagnostic data, radiographs and the reports.     I have ordered additional labs to follow the current medical conditions and to monitor treatment responses over the next 24 hours or sooner if needed. I will order additional imaging to follow longitudinal changes found on the most current imaging. Risk of deterioration: high   [x] High complexity decision making was performed  [x] See my orders for details  Tubes:   Rankin  ICU disposition :Unchanged. Code: Full Code        Hemodynamics:    CO:    CI:    CVP:    SVR:   PAP Systolic:    PAP Diastolic:    PVR:    SH66:        Ventilator Settings:      Mode Rate TV Press PEEP FiO2 PIP Min. Vent                            Vital Signs: Intake/Output: Intake/Output:   Temp (24hrs), Av.5 °F (36.9 °C), Min:97.6 °F (36.4 °C), Max:99.2 °F (37.3 °C)     Visit Vitals    /64    Pulse (!) 108    Temp 98.5 °F (36.9 °C)    Resp 25    Wt 77.6 kg (171 lb)    SpO2 95%    BMI 28.9 kg/m2        Telemetry:    normal sinus rhythm   O2 Device: Nasal cannula  O2 Flow Rate (L/min): 2 l/min  Wt Readings from Last 4 Encounters:   17 77.6 kg (171 lb)   17 78.9 kg (174 lb)   17 82.1 kg (181 lb 1.6 oz)   17 85.8 kg (189 lb 1.6 oz)          Intake/Output Summary (Last 24 hours) at 17 0831  Last data filed at 17 0800   Gross per 24 hour   Intake          1401.05 ml   Output             3100 ml   Net         -1698.95 ml     Last shift:      701 - 1900  In: 180.9 [I.V.:180.9]  Out: -   Last 3 shifts: 1901 -  07  In: 1220.1 [I.V.:1220.1]  Out: 3100 [Urine:3100]     Physical Exam:    General: severely ill WF   HEAD: Normocephalic, without obvious abnormality, atraumatic; ear piercings   EYES: conjuctivae clear. PERRL,  AN Icteric sclerae   NOSE: Nares normal;    THROAT:  mucous membranes dry , poor Oral hygiene; No Thrush; class 3 airway;  Tongue midline   Neck: Supple, symmetrical, trachea midline, No accessory mm use, No Stridor/ cuff leak   LYMPH: No abnormally enlarged lymph nodes.    Chest: normal   Lungs: normal air entry   Heart: Regular rate and rhythm   Abdomen: soft, non-tender, without masses or organomegaly   :      Rankin   Extremity: negative, clubbing    Neuro: lethargic   Psych: lethargic   Skin: Turgor absent;    Pulses:Bilateral, Radial, 2+   Capillary refill: abnormal:  sluggish capillary refill,    Tubes:   Rankin    DATA:    Interval lab and diagnostic data was reviewed. Interval radiology images were independently viewed and available reports were reviewed. All lab results for the last 24 hours reviewed. MAR reviewed and pertinent medications noted or modified as needed  MEDS:   Current Facility-Administered Medications   Medication    sodium chloride (NS) flush 5-10 mL    insulin regular (NOVOLIN R, HUMULIN R) 100 Units in 0.9% sodium chloride 100 mL infusion    acetaminophen (TYLENOL) suppository 650 mg    ondansetron (ZOFRAN) injection 4 mg    glucose chewable tablet 16 g    dextrose (D50W) injection syrg 12.5-25 g    glucagon (GLUCAGEN) injection 1 mg    piperacillin-tazobactam (ZOSYN) 3.375 g in 0.9% sodium chloride (MBP/ADV) 100 mL    dextrose 5% and 0.9% NaCl infusion    mupirocin (BACTROBAN) 2 % ointment        Labs:    Recent Labs      11/20/17   1206   WBC  12.2*   HGB  10.5*   PLT  296     Recent Labs      11/21/17   0518  11/20/17 2025 11/20/17   1206   NA  141  136  124*   K  3.9  4.0  4.7   CL  108  103  85*   CO2  12*  17*  10*   GLU  288*  92  435*   BUN  30*  48*  63*   CREA  1.49*  1.64*  2.16*   CA  7.9*  7.5*  8.5   MG  2.2  2.8*   --    PHOS  2.5*  2.4*   --    LAC   --    --   1.8   ALB   --    --   2.5*   SGOT   --    --   392*   ALT   --    --   152*     No results for input(s): PH, PCO2, PO2, HCO3, FIO2 in the last 72 hours.   Recent Labs      11/20/17   1206   TROIQ  <0.04     No results found for: BNPP, BNP   Lab Results   Component Value Date/Time    Culture result: NO GROWTH AFTER 18 HOURS 11/20/2017 12:06 PM Culture result: NO GROWTH AFTER 19 HOURS 11/20/2017 12:06 PM    Culture result: NO GROWTH 2 DAYS 05/24/2017 04:30 PM     No results found for: VANCT, CPK  Lab Results   Component Value Date/Time    Color YELLOW/STRAW 11/20/2017 12:34 PM    Appearance CLOUDY 11/20/2017 12:34 PM    pH (UA) 5.0 11/20/2017 12:34 PM    Protein 30 11/20/2017 12:34 PM    Glucose 250 11/20/2017 12:34 PM    Ketone 40 11/20/2017 12:34 PM    Bilirubin NEGATIVE  05/22/2010 05:15 PM    Blood LARGE 11/20/2017 12:34 PM    Urobilinogen 0.2 11/20/2017 12:34 PM    Nitrites NEGATIVE  11/20/2017 12:34 PM    Leukocyte Esterase NEGATIVE  11/20/2017 12:34 PM    WBC 0-4 11/20/2017 12:34 PM    RBC 0-5 11/20/2017 12:34 PM    Bacteria NEGATIVE  11/20/2017 12:34 PM       No results found for: IRON, FE, TIBC, IBCT, PSAT, FERR  Lab Results   Component Value Date/Time    TSH 4.01 11/08/2012 03:20 AM      Lab Results   Component Value Date/Time    Hepatitis B surface Ag <0.10 10/22/2014 11:30 AM       Imaging:      Results from Hospital Encounter encounter on 11/20/17   XR CHEST PORT   Narrative EXAM:  XR CHEST PORT    INDICATION:  Sepsis    COMPARISON:  11/7/2012    FINDINGS:    A portable AP radiograph of the chest was obtained at 13:56 hours. The patient  is on a cardiac monitor. There is patchy bibasilar airspace disease and mild  vascular congestion. The pattern is more suggestive of pulmonary edema than of  pneumonia, although bilateral pneumonia cannot be excluded. There is no pleural  fluid. The cardiac silhouette is normal in size. The bones and soft tissues  are unremarkable. Impression IMPRESSION:     Bibasilar airspace disease and vascular congestion suggesting pulmonary edema. Different diagnosis of pneumonia. Results from [place] encounter on 11/20/17   CT HEAD WO CONT   Narrative EXAM:  CT HEAD WITHOUT CONTRAST  INDICATION: Trauma. COMPARISON: None. CONTRAST: None.     TECHNIQUE: Unenhanced CT of the head was performed using 5 mm images. Brain and  bone windows were generated. Sagittal and coronal reformations were generated. CT dose reduction was achieved through use of a standardized protocol tailored  for this examination and automatic exposure control for dose modulation. CT dose  reduction was achieved through use of a standardized protocol tailored for this  examination and automatic exposure control for dose modulation. FINDINGS:  The ventricles and sulci are normal in size, shape and configuration and  midline. There is no significant white matter disease. There is no  intracranial hemorrhage. There is no extra-axial collection, mass, mass effect  or midline shift. The basilar cisterns are open. No acute infarct is  identified. The bone windows demonstrate no abnormalities. The visualized  portions of the paranasal sinuses and mastoid air cells are clear. Impression IMPRESSION: Normal unenhanced CT examination of the head.             Terese Rai MD

## 2017-11-21 NOTE — DIABETES MGMT
DTC Progress Note    Recommendations/ Comments: Pt discussed with rounding team and Dr. Debbi Martins. Plan to continue insulin gtt at this time- anion gap remains elevated. Pt remains encephalopathic and not appropriate for consult. Chart reviewed on Rudi Orozco during Multidisciplinary Rounds. A1c:   Lab Results   Component Value Date/Time    Hemoglobin A1c 8.9 11/20/2017 01:54 PM           Recent Glucose Results:   Lab Results   Component Value Date/Time     (H) 11/21/2017 05:18 AM    GLU 92 11/20/2017 08:25 PM     (H) 11/20/2017 12:06 PM    GLUCPOC 170 (H) 11/21/2017 09:52 AM    GLUCPOC 227 (H) 11/21/2017 08:35 AM    GLUCPOC 265 (H) 11/21/2017 07:30 AM        Lab Results   Component Value Date/Time    Creatinine 1.49 11/21/2017 05:18 AM     Estimated Creatinine Clearance: 48.1 mL/min (based on Cr of 1.49). Active Orders   Diet    DIET NPO        PO intake: No data found. Will continue to follow as needed. Thank you.   Clara Watson, JAIMEN, RN, [address]

## 2017-11-21 NOTE — TELEPHONE ENCOUNTER
Will check in on her tomorrow if still in hospital. Forwarding to Dr Margarette Coles so he'll be aware of hospitalization

## 2017-11-21 NOTE — PROGRESS NOTES
Patient resting at this time. Patient restless off and on and sitter remains at bedside. 1046 Potassium level 3.1. Dr. Mica Guido notified and orders placed for KCL replacement. 1127 Lab called and reported blood culture gram positive cocci in 1 of 2 bottles. Dr. Mica Guido notified and no change in orders at this time. 1146 Lab called to report phosphorus level 0.8.   1147 Dr. Pranav Alcazar notified of phosphorus level of 0.8. Orders placed by Dr. Pranav Alcazar. 1304 JEY Green in to see patient. 1513 Patient restless off and on. Sitter remains at bedside. 1710 Patient restless and trying to get out of bed. Patient re-positioned in bed. Sitter at bedside. 1800 Patient off and on restless. Trying to get up and out of bed. Sitter at bedside. Visitor in to see patient. 1900 report to Marty Merino RN. Sitter remains in with patient.

## 2017-11-21 NOTE — PROGRESS NOTES
Pressure Ulcer Prevention Alert Received for Joey < 14 (moderate risk).        Care Plan/Interventions for Nursin. Complete Joey Pressure Ulcer Risk Scale and use sub scores to identify appropriate interventions. 2. Perform Assessment: skin, changes in LOC, visual cues for pain, monitor skin under medical devices  3. Respond to Reduced Sensory Perception: changes in LOC, check visual cues for pain, float heels, suspension boots, pressure redistribution bed/mattress/chair cushion, turning and reposition approximately every 2 hours (pillows & wedges), pad between skin to skin, turn & reposition  4. Manage Moisture: absorbent under pads, internal / external urinary device, internal /  external fecal device, minimize layers, contain wound drainage, access need for specialty bed, limit adult briefs, maintain skin hydration (lotion/cream), moisture barrier, offer toileting every hour  5. Promote Activity: increase time out of bed, chair cushion, PT/OT evaluation, trapeze to reposition, pressure redistribution bed/mattress/chair  6. Address Reduced Mobility: float heels / suspension boot, HOB 30 degrees or less, pressure redistribution bed/mattress/cushion, PT / OT evaluation, turn and reposition approximately every 2 hours (pillows & wedges)  7. Promote Nutrition: document food / fluid / supplement intake, encourage/assist with meals as needed  8. Reduce Friction and Shear: transferring/repositioning devices (lift/draw sheet), lift team/ patient mobility team, feet elevated on foot rest, minimize layers, foam dressing / transparent film / skin sealants, protective barrier creams and emollients, transfer aides (board, Debi lift, ceiling lift, stand assist), HOB 30 degrees or less, trapeze to reposition.   Wound Care Team

## 2017-11-21 NOTE — PROGRESS NOTES
1900 Verbal report received from [facility] Paged hospitalist concerning changing IV fluids to D5    2000 Assessment complete. Patient is drowsy but responds to voice. Does not always follow commands. Lung sounds are coarse. Bowel sounds are active. Pulses are palpable. PIVx3 Insulin gtt and Normal Saline infusing @ 150mL/hr.    2130 Patient extremely restless. Trying to climb out of bed, unable to reorient. Bladder scanned for >1000. Patient placed on bed pan multiple times, unable to void. Rankin catheter placed. 2215 patient appears to be resting more comfortably now. Urine output of 1450mL    0000 Reassessment complete. No changes from previous. 0400 Reassessment complete. No changes from previous.     0700 bedside verbal report given to Daniel Lee RN

## 2017-11-21 NOTE — TELEPHONE ENCOUNTER
Patient friend Vkias Espinoza called to let Dr. Joseph Lainez know that [facility] was in the [facility]) with dka.

## 2017-11-22 LAB
ADMINISTERED INITIALS, ADMINIT: NORMAL
ANION GAP SERPL CALC-SCNC: 10 MMOL/L (ref 5–15)
ANION GAP SERPL CALC-SCNC: 11 MMOL/L (ref 5–15)
ANION GAP SERPL CALC-SCNC: 13 MMOL/L (ref 5–15)
ATRIAL RATE: 117 BPM
BUN SERPL-MCNC: 6 MG/DL (ref 6–20)
BUN SERPL-MCNC: 6 MG/DL (ref 6–20)
BUN SERPL-MCNC: 8 MG/DL (ref 6–20)
BUN/CREAT SERPL: 6 (ref 12–20)
BUN/CREAT SERPL: 6 (ref 12–20)
BUN/CREAT SERPL: 7 (ref 12–20)
CALCIUM SERPL-MCNC: 7.4 MG/DL (ref 8.5–10.1)
CALCIUM SERPL-MCNC: 7.7 MG/DL (ref 8.5–10.1)
CALCIUM SERPL-MCNC: 7.8 MG/DL (ref 8.5–10.1)
CALCULATED P AXIS, ECG09: 49 DEGREES
CALCULATED R AXIS, ECG10: 20 DEGREES
CALCULATED T AXIS, ECG11: 50 DEGREES
CHLORIDE SERPL-SCNC: 104 MMOL/L (ref 97–108)
CHLORIDE SERPL-SCNC: 107 MMOL/L (ref 97–108)
CHLORIDE SERPL-SCNC: 107 MMOL/L (ref 97–108)
CO2 SERPL-SCNC: 26 MMOL/L (ref 21–32)
CO2 SERPL-SCNC: 26 MMOL/L (ref 21–32)
CO2 SERPL-SCNC: 27 MMOL/L (ref 21–32)
CREAT SERPL-MCNC: 0.99 MG/DL (ref 0.55–1.02)
CREAT SERPL-MCNC: 1.04 MG/DL (ref 0.55–1.02)
CREAT SERPL-MCNC: 1.12 MG/DL (ref 0.55–1.02)
D50 ADMINISTERED, D50ADM: 0 ML
D50 ORDER, D50ORD: 0 ML
DIAGNOSIS, 93000: NORMAL
GLSCOM COMMENTS: NORMAL
GLUCOSE BLD STRIP.AUTO-MCNC: 147 MG/DL (ref 65–100)
GLUCOSE BLD STRIP.AUTO-MCNC: 155 MG/DL (ref 65–100)
GLUCOSE BLD STRIP.AUTO-MCNC: 155 MG/DL (ref 65–100)
GLUCOSE BLD STRIP.AUTO-MCNC: 156 MG/DL (ref 65–100)
GLUCOSE BLD STRIP.AUTO-MCNC: 157 MG/DL (ref 65–100)
GLUCOSE BLD STRIP.AUTO-MCNC: 159 MG/DL (ref 65–100)
GLUCOSE BLD STRIP.AUTO-MCNC: 162 MG/DL (ref 65–100)
GLUCOSE BLD STRIP.AUTO-MCNC: 189 MG/DL (ref 65–100)
GLUCOSE BLD STRIP.AUTO-MCNC: 221 MG/DL (ref 65–100)
GLUCOSE BLD STRIP.AUTO-MCNC: 226 MG/DL (ref 65–100)
GLUCOSE BLD STRIP.AUTO-MCNC: 251 MG/DL (ref 65–100)
GLUCOSE BLD STRIP.AUTO-MCNC: 251 MG/DL (ref 65–100)
GLUCOSE BLD STRIP.AUTO-MCNC: 299 MG/DL (ref 65–100)
GLUCOSE SERPL-MCNC: 137 MG/DL (ref 65–100)
GLUCOSE SERPL-MCNC: 148 MG/DL (ref 65–100)
GLUCOSE SERPL-MCNC: 205 MG/DL (ref 65–100)
GLUCOSE, GLC: 147 MG/DL
GLUCOSE, GLC: 155 MG/DL
GLUCOSE, GLC: 156 MG/DL
GLUCOSE, GLC: 157 MG/DL
GLUCOSE, GLC: 159 MG/DL
GLUCOSE, GLC: 162 MG/DL
GLUCOSE, GLC: 189 MG/DL
GLUCOSE, GLC: 221 MG/DL
GLUCOSE, GLC: 226 MG/DL
GLUCOSE, GLC: 251 MG/DL
HIGH TARGET, HITG: 250 MG/DL
INSULIN ADMINSTERED, INSADM: 1.7 UNITS/HOUR
INSULIN ADMINSTERED, INSADM: 1.9 UNITS/HOUR
INSULIN ADMINSTERED, INSADM: 1.9 UNITS/HOUR
INSULIN ADMINSTERED, INSADM: 2 UNITS/HOUR
INSULIN ADMINSTERED, INSADM: 2 UNITS/HOUR
INSULIN ADMINSTERED, INSADM: 2.9 UNITS/HOUR
INSULIN ADMINSTERED, INSADM: 3.2 UNITS/HOUR
INSULIN ADMINSTERED, INSADM: 3.3 UNITS/HOUR
INSULIN ADMINSTERED, INSADM: 3.9 UNITS/HOUR
INSULIN ADMINSTERED, INSADM: 5.7 UNITS/HOUR
INSULIN ORDER, INSORD: 1.7 UNITS/HOUR
INSULIN ORDER, INSORD: 1.9 UNITS/HOUR
INSULIN ORDER, INSORD: 1.9 UNITS/HOUR
INSULIN ORDER, INSORD: 2 UNITS/HOUR
INSULIN ORDER, INSORD: 2 UNITS/HOUR
INSULIN ORDER, INSORD: 2.9 UNITS/HOUR
INSULIN ORDER, INSORD: 3.2 UNITS/HOUR
INSULIN ORDER, INSORD: 3.3 UNITS/HOUR
INSULIN ORDER, INSORD: 3.9 UNITS/HOUR
INSULIN ORDER, INSORD: 5.7 UNITS/HOUR
LOW TARGET, LOT: 150 MG/DL
MAGNESIUM SERPL-MCNC: 1 MG/DL (ref 1.6–2.4)
MAGNESIUM SERPL-MCNC: 1 MG/DL (ref 1.6–2.4)
MINUTES UNTIL NEXT BG, NBG: 120 MIN
MINUTES UNTIL NEXT BG, NBG: 60 MIN
MULTIPLIER, MUL: 0.02
MULTIPLIER, MUL: 0.03
ORDER INITIALS, ORDINIT: NORMAL
P-R INTERVAL, ECG05: 186 MS
PHOSPHATE SERPL-MCNC: 1.2 MG/DL (ref 2.6–4.7)
PHOSPHATE SERPL-MCNC: 1.2 MG/DL (ref 2.6–4.7)
POTASSIUM SERPL-SCNC: 2.8 MMOL/L (ref 3.5–5.1)
POTASSIUM SERPL-SCNC: 2.9 MMOL/L (ref 3.5–5.1)
POTASSIUM SERPL-SCNC: 3.4 MMOL/L (ref 3.5–5.1)
Q-T INTERVAL, ECG07: 324 MS
QRS DURATION, ECG06: 78 MS
QTC CALCULATION (BEZET), ECG08: 451 MS
SERVICE CMNT-IMP: ABNORMAL
SODIUM SERPL-SCNC: 143 MMOL/L (ref 136–145)
SODIUM SERPL-SCNC: 144 MMOL/L (ref 136–145)
SODIUM SERPL-SCNC: 144 MMOL/L (ref 136–145)
VENTRICULAR RATE, ECG03: 117 BPM

## 2017-11-22 PROCEDURE — 74011636637 HC RX REV CODE- 636/637: Performed by: EMERGENCY MEDICINE

## 2017-11-22 PROCEDURE — 74011636637 HC RX REV CODE- 636/637: Performed by: INTERNAL MEDICINE

## 2017-11-22 PROCEDURE — 80048 BASIC METABOLIC PNL TOTAL CA: CPT | Performed by: INTERNAL MEDICINE

## 2017-11-22 PROCEDURE — 74011250636 HC RX REV CODE- 250/636: Performed by: INTERNAL MEDICINE

## 2017-11-22 PROCEDURE — 36415 COLL VENOUS BLD VENIPUNCTURE: CPT | Performed by: INTERNAL MEDICINE

## 2017-11-22 PROCEDURE — 84100 ASSAY OF PHOSPHORUS: CPT | Performed by: INTERNAL MEDICINE

## 2017-11-22 PROCEDURE — 74011000258 HC RX REV CODE- 258: Performed by: EMERGENCY MEDICINE

## 2017-11-22 PROCEDURE — 87040 BLOOD CULTURE FOR BACTERIA: CPT | Performed by: INTERNAL MEDICINE

## 2017-11-22 PROCEDURE — 83735 ASSAY OF MAGNESIUM: CPT | Performed by: INTERNAL MEDICINE

## 2017-11-22 PROCEDURE — 74011000250 HC RX REV CODE- 250: Performed by: INTERNAL MEDICINE

## 2017-11-22 PROCEDURE — 74011000258 HC RX REV CODE- 258: Performed by: INTERNAL MEDICINE

## 2017-11-22 PROCEDURE — 65610000006 HC RM INTENSIVE CARE

## 2017-11-22 PROCEDURE — 82962 GLUCOSE BLOOD TEST: CPT

## 2017-11-22 RX ORDER — FENTANYL CITRATE 50 UG/ML
25-50 INJECTION, SOLUTION INTRAMUSCULAR; INTRAVENOUS
Status: DISCONTINUED | OUTPATIENT
Start: 2017-11-22 | End: 2017-11-23

## 2017-11-22 RX ORDER — MAGNESIUM SULFATE 100 %
4 CRYSTALS MISCELLANEOUS AS NEEDED
Status: DISCONTINUED | OUTPATIENT
Start: 2017-11-22 | End: 2017-11-26 | Stop reason: HOSPADM

## 2017-11-22 RX ORDER — INSULIN LISPRO 100 [IU]/ML
INJECTION, SOLUTION INTRAVENOUS; SUBCUTANEOUS EVERY 6 HOURS
Status: DISCONTINUED | OUTPATIENT
Start: 2017-11-22 | End: 2017-11-23

## 2017-11-22 RX ORDER — MAGNESIUM SULFATE HEPTAHYDRATE 40 MG/ML
2 INJECTION, SOLUTION INTRAVENOUS ONCE
Status: COMPLETED | OUTPATIENT
Start: 2017-11-22 | End: 2017-11-22

## 2017-11-22 RX ORDER — FENTANYL CITRATE 50 UG/ML
50 INJECTION, SOLUTION INTRAMUSCULAR; INTRAVENOUS ONCE
Status: COMPLETED | OUTPATIENT
Start: 2017-11-22 | End: 2017-11-22

## 2017-11-22 RX ORDER — POTASSIUM CHLORIDE 14.9 MG/ML
10 INJECTION INTRAVENOUS
Status: DISPENSED | OUTPATIENT
Start: 2017-11-22 | End: 2017-11-22

## 2017-11-22 RX ORDER — INSULIN GLARGINE 100 [IU]/ML
18 INJECTION, SOLUTION SUBCUTANEOUS DAILY
Status: DISCONTINUED | OUTPATIENT
Start: 2017-11-22 | End: 2017-11-23

## 2017-11-22 RX ORDER — IPRATROPIUM BROMIDE AND ALBUTEROL SULFATE 2.5; .5 MG/3ML; MG/3ML
3 SOLUTION RESPIRATORY (INHALATION)
Status: DISCONTINUED | OUTPATIENT
Start: 2017-11-22 | End: 2017-11-26 | Stop reason: HOSPADM

## 2017-11-22 RX ORDER — DEXTROSE 50 % IN WATER (D50W) INTRAVENOUS SYRINGE
12.5-25 AS NEEDED
Status: DISCONTINUED | OUTPATIENT
Start: 2017-11-22 | End: 2017-11-26 | Stop reason: HOSPADM

## 2017-11-22 RX ADMIN — POTASSIUM CHLORIDE 10 MEQ: 200 INJECTION, SOLUTION INTRAVENOUS at 14:43

## 2017-11-22 RX ADMIN — SODIUM CHLORIDE 0.5 MCG/KG/HR: 900 INJECTION, SOLUTION INTRAVENOUS at 20:43

## 2017-11-22 RX ADMIN — PIPERACILLIN SODIUM,TAZOBACTAM SODIUM 3.38 G: 3; .375 INJECTION, POWDER, FOR SOLUTION INTRAVENOUS at 01:18

## 2017-11-22 RX ADMIN — SODIUM CHLORIDE 0.2 MCG/KG/HR: 900 INJECTION, SOLUTION INTRAVENOUS at 12:12

## 2017-11-22 RX ADMIN — POTASSIUM CHLORIDE 10 MEQ: 200 INJECTION, SOLUTION INTRAVENOUS at 11:42

## 2017-11-22 RX ADMIN — MAGNESIUM SULFATE HEPTAHYDRATE 2 G: 40 INJECTION, SOLUTION INTRAVENOUS at 11:43

## 2017-11-22 RX ADMIN — MUPIROCIN: 20 OINTMENT TOPICAL at 09:01

## 2017-11-22 RX ADMIN — MAGNESIUM SULFATE HEPTAHYDRATE: 500 INJECTION, SOLUTION INTRAMUSCULAR; INTRAVENOUS at 11:59

## 2017-11-22 RX ADMIN — PIPERACILLIN SODIUM,TAZOBACTAM SODIUM 3.38 G: 3; .375 INJECTION, POWDER, FOR SOLUTION INTRAVENOUS at 07:45

## 2017-11-22 RX ADMIN — FENTANYL CITRATE 50 MCG: 50 INJECTION, SOLUTION INTRAMUSCULAR; INTRAVENOUS at 08:35

## 2017-11-22 RX ADMIN — ONDANSETRON HYDROCHLORIDE 4 MG: 2 INJECTION, SOLUTION INTRAMUSCULAR; INTRAVENOUS at 11:57

## 2017-11-22 RX ADMIN — FENTANYL CITRATE 50 MCG: 50 INJECTION, SOLUTION INTRAMUSCULAR; INTRAVENOUS at 09:39

## 2017-11-22 RX ADMIN — LORAZEPAM 0.5 MG: 2 INJECTION INTRAMUSCULAR; INTRAVENOUS at 03:07

## 2017-11-22 RX ADMIN — ENOXAPARIN SODIUM 40 MG: 40 INJECTION SUBCUTANEOUS at 11:48

## 2017-11-22 RX ADMIN — POTASSIUM CHLORIDE 10 MEQ: 200 INJECTION, SOLUTION INTRAVENOUS at 15:37

## 2017-11-22 RX ADMIN — SODIUM CHLORIDE 2 UNITS/HR: 900 INJECTION, SOLUTION INTRAVENOUS at 10:05

## 2017-11-22 RX ADMIN — PIPERACILLIN SODIUM,TAZOBACTAM SODIUM 3.38 G: 3; .375 INJECTION, POWDER, FOR SOLUTION INTRAVENOUS at 16:49

## 2017-11-22 RX ADMIN — INSULIN GLARGINE 18 UNITS: 100 INJECTION, SOLUTION SUBCUTANEOUS at 11:00

## 2017-11-22 RX ADMIN — FENTANYL CITRATE 50 MCG: 50 INJECTION, SOLUTION INTRAMUSCULAR; INTRAVENOUS at 13:21

## 2017-11-22 RX ADMIN — POTASSIUM CHLORIDE 10 MEQ: 200 INJECTION, SOLUTION INTRAVENOUS at 15:00

## 2017-11-22 RX ADMIN — VANCOMYCIN HYDROCHLORIDE 1250 MG: 10 INJECTION, POWDER, LYOPHILIZED, FOR SOLUTION INTRAVENOUS at 09:01

## 2017-11-22 RX ADMIN — INSULIN LISPRO 5 UNITS: 100 INJECTION, SOLUTION INTRAVENOUS; SUBCUTANEOUS at 19:52

## 2017-11-22 RX ADMIN — POTASSIUM CHLORIDE 10 MEQ: 200 INJECTION, SOLUTION INTRAVENOUS at 13:21

## 2017-11-22 RX ADMIN — SODIUM CHLORIDE 0.4 MCG/KG/HR: 900 INJECTION, SOLUTION INTRAVENOUS at 13:34
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
## 2017-11-22 NOTE — INTERDISCIPLINARY ROUNDS
Interdisciplinary team rounds were held 11/22/2017   with the following team members:Care Management, Diabetes Treatment Specialist, Nursing, Occupational Therapy, Pharmacy, Physical Therapy, Physician and Clinical Coordinator. Plan of care discussed. Goal: See MD orders and progress notes for further  interventions and desired outcomes.
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
## 2017-11-22 NOTE — DIABETES MGMT
DTC Progress Note    Recommendations/ Comments: Pt discussed with rounding team and Dr. Amirah Gray- will discontinue insulin drip per protocol, using Lantus 18 units (last dose from Dr. Gualberto Montgomery her endocrinologist)  Will give Lantus and discontinue insulin drip and dextrose IVF after 2 hours. Patient remains inappropriate for education at this time due to confusion. Chart reviewed on [facility] during Multidisciplinary Rounds. A1c:   Lab Results   Component Value Date/Time    Hemoglobin A1c 8.8 11/21/2017 05:18 AM           Recent Glucose Results:   Lab Results   Component Value Date/Time     (H) 11/22/2017 05:28 AM     (H) 11/22/2017 01:17 AM     (H) 11/21/2017 08:58 PM    GLUCPOC 162 (H) 11/22/2017 10:02 AM    GLUCPOC 156 (H) 11/22/2017 09:13 AM    GLUCPOC 226 (H) 11/22/2017 07:59 AM        Lab Results   Component Value Date/Time    Creatinine 0.99 11/22/2017 05:28 AM     Estimated Creatinine Clearance: 72.1 mL/min (based on Cr of 0.99). Active Orders   Diet    DIET NPO        PO intake: No data found. Will continue to follow as needed. Thank you.         Edu Jiang, [address] [place] [address], [address]  Office:  [phone]
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
## 2017-11-23 LAB
ALBUMIN SERPL-MCNC: 2.2 G/DL (ref 3.5–5)
ALBUMIN/GLOB SERPL: 0.6 {RATIO} (ref 1.1–2.2)
ALP SERPL-CCNC: 103 U/L (ref 45–117)
ALT SERPL-CCNC: 87 U/L (ref 12–78)
ANION GAP SERPL CALC-SCNC: 10 MMOL/L (ref 5–15)
ANION GAP SERPL CALC-SCNC: 12 MMOL/L (ref 5–15)
ANION GAP SERPL CALC-SCNC: 12 MMOL/L (ref 5–15)
ANION GAP SERPL CALC-SCNC: 14 MMOL/L (ref 5–15)
AST SERPL-CCNC: 74 U/L (ref 15–37)
BASOPHILS # BLD: 0 K/UL (ref 0–0.1)
BASOPHILS NFR BLD: 0 % (ref 0–1)
BILIRUB SERPL-MCNC: 0.5 MG/DL (ref 0.2–1)
BUN SERPL-MCNC: 10 MG/DL (ref 6–20)
BUN SERPL-MCNC: 10 MG/DL (ref 6–20)
BUN SERPL-MCNC: 8 MG/DL (ref 6–20)
BUN SERPL-MCNC: 8 MG/DL (ref 6–20)
BUN/CREAT SERPL: 7 (ref 12–20)
BUN/CREAT SERPL: 7 (ref 12–20)
BUN/CREAT SERPL: 8 (ref 12–20)
BUN/CREAT SERPL: 8 (ref 12–20)
CALCIUM SERPL-MCNC: 6.7 MG/DL (ref 8.5–10.1)
CALCIUM SERPL-MCNC: 7.2 MG/DL (ref 8.5–10.1)
CALCIUM SERPL-MCNC: 7.5 MG/DL (ref 8.5–10.1)
CALCIUM SERPL-MCNC: 7.6 MG/DL (ref 8.5–10.1)
CHLORIDE SERPL-SCNC: 105 MMOL/L (ref 97–108)
CHLORIDE SERPL-SCNC: 87 MMOL/L (ref 97–108)
CHLORIDE SERPL-SCNC: 90 MMOL/L (ref 97–108)
CHLORIDE SERPL-SCNC: 95 MMOL/L (ref 97–108)
CO2 SERPL-SCNC: 22 MMOL/L (ref 21–32)
CO2 SERPL-SCNC: 24 MMOL/L (ref 21–32)
CO2 SERPL-SCNC: 25 MMOL/L (ref 21–32)
CO2 SERPL-SCNC: 25 MMOL/L (ref 21–32)
CREAT SERPL-MCNC: 1.05 MG/DL (ref 0.55–1.02)
CREAT SERPL-MCNC: 1.1 MG/DL (ref 0.55–1.02)
CREAT SERPL-MCNC: 1.19 MG/DL (ref 0.55–1.02)
CREAT SERPL-MCNC: 1.42 MG/DL (ref 0.55–1.02)
DIFFERENTIAL METHOD BLD: ABNORMAL
EOSINOPHIL # BLD: 0 K/UL (ref 0–0.4)
EOSINOPHIL NFR BLD: 0 % (ref 0–7)
ERYTHROCYTE [DISTWIDTH] IN BLOOD BY AUTOMATED COUNT: 21 % (ref 11.5–14.5)
GGT SERPL-CCNC: 168 U/L (ref 5–55)
GLOBULIN SER CALC-MCNC: 4 G/DL (ref 2–4)
GLUCOSE BLD STRIP.AUTO-MCNC: 182 MG/DL (ref 65–100)
GLUCOSE BLD STRIP.AUTO-MCNC: 277 MG/DL (ref 65–100)
GLUCOSE BLD STRIP.AUTO-MCNC: 375 MG/DL (ref 65–100)
GLUCOSE BLD STRIP.AUTO-MCNC: 386 MG/DL (ref 65–100)
GLUCOSE BLD STRIP.AUTO-MCNC: 415 MG/DL (ref 65–100)
GLUCOSE BLD STRIP.AUTO-MCNC: 421 MG/DL (ref 65–100)
GLUCOSE BLD STRIP.AUTO-MCNC: 474 MG/DL (ref 65–100)
GLUCOSE SERPL-MCNC: 297 MG/DL (ref 65–100)
GLUCOSE SERPL-MCNC: 346 MG/DL (ref 65–100)
GLUCOSE SERPL-MCNC: 371 MG/DL (ref 65–100)
GLUCOSE SERPL-MCNC: 396 MG/DL (ref 65–100)
HCT VFR BLD AUTO: 30.9 % (ref 35–47)
HGB BLD-MCNC: 10.1 G/DL (ref 11.5–16)
LYMPHOCYTES # BLD: 2.4 K/UL (ref 0.8–3.5)
LYMPHOCYTES NFR BLD: 28 % (ref 12–49)
MAGNESIUM SERPL-MCNC: 1.8 MG/DL (ref 1.6–2.4)
MCH RBC QN AUTO: 32.9 PG (ref 26–34)
MCHC RBC AUTO-ENTMCNC: 32.7 G/DL (ref 30–36.5)
MCV RBC AUTO: 100.7 FL (ref 80–99)
METAMYELOCYTES NFR BLD MANUAL: 7 %
MONOCYTES # BLD: 0.7 K/UL (ref 0–1)
MONOCYTES NFR BLD: 8 % (ref 5–13)
MYELOCYTES NFR BLD MANUAL: 1 %
NEUTS BAND NFR BLD MANUAL: 5 % (ref 0–6)
NEUTS SEG # BLD: 4.8 K/UL (ref 1.8–8)
NEUTS SEG NFR BLD: 51 % (ref 32–75)
PHOSPHATE SERPL-MCNC: 3.2 MG/DL (ref 2.6–4.7)
PLATELET # BLD AUTO: 259 K/UL (ref 150–400)
POTASSIUM SERPL-SCNC: 2.9 MMOL/L (ref 3.5–5.1)
POTASSIUM SERPL-SCNC: 3.1 MMOL/L (ref 3.5–5.1)
POTASSIUM SERPL-SCNC: 3.6 MMOL/L (ref 3.5–5.1)
POTASSIUM SERPL-SCNC: 3.7 MMOL/L (ref 3.5–5.1)
PROT SERPL-MCNC: 6.2 G/DL (ref 6.4–8.2)
RBC # BLD AUTO: 3.07 M/UL (ref 3.8–5.2)
RBC MORPH BLD: ABNORMAL
SERVICE CMNT-IMP: ABNORMAL
SODIUM SERPL-SCNC: 124 MMOL/L (ref 136–145)
SODIUM SERPL-SCNC: 124 MMOL/L (ref 136–145)
SODIUM SERPL-SCNC: 131 MMOL/L (ref 136–145)
SODIUM SERPL-SCNC: 142 MMOL/L (ref 136–145)
TSH SERPL DL<=0.05 MIU/L-ACNC: 4.75 UIU/ML (ref 0.36–3.74)
WBC # BLD AUTO: 8.6 K/UL (ref 3.6–11)

## 2017-11-23 PROCEDURE — 74011250636 HC RX REV CODE- 250/636: Performed by: INTERNAL MEDICINE

## 2017-11-23 PROCEDURE — 74011636637 HC RX REV CODE- 636/637: Performed by: INTERNAL MEDICINE

## 2017-11-23 PROCEDURE — 74011000258 HC RX REV CODE- 258: Performed by: INTERNAL MEDICINE

## 2017-11-23 PROCEDURE — 83735 ASSAY OF MAGNESIUM: CPT | Performed by: INTERNAL MEDICINE

## 2017-11-23 PROCEDURE — 80048 BASIC METABOLIC PNL TOTAL CA: CPT | Performed by: INTERNAL MEDICINE

## 2017-11-23 PROCEDURE — 77030011256 HC DRSG MEPILEX <16IN NO BORD MOLN -A

## 2017-11-23 PROCEDURE — 65610000006 HC RM INTENSIVE CARE

## 2017-11-23 PROCEDURE — 84443 ASSAY THYROID STIM HORMONE: CPT | Performed by: INTERNAL MEDICINE

## 2017-11-23 PROCEDURE — 74011636637 HC RX REV CODE- 636/637: Performed by: EMERGENCY MEDICINE

## 2017-11-23 PROCEDURE — 82962 GLUCOSE BLOOD TEST: CPT

## 2017-11-23 PROCEDURE — 80053 COMPREHEN METABOLIC PANEL: CPT | Performed by: INTERNAL MEDICINE

## 2017-11-23 PROCEDURE — 84100 ASSAY OF PHOSPHORUS: CPT | Performed by: INTERNAL MEDICINE

## 2017-11-23 PROCEDURE — 74011250637 HC RX REV CODE- 250/637: Performed by: EMERGENCY MEDICINE

## 2017-11-23 PROCEDURE — 95816 EEG AWAKE AND DROWSY: CPT | Performed by: PSYCHIATRY & NEUROLOGY

## 2017-11-23 PROCEDURE — 74011250637 HC RX REV CODE- 250/637: Performed by: INTERNAL MEDICINE

## 2017-11-23 PROCEDURE — 36415 COLL VENOUS BLD VENIPUNCTURE: CPT | Performed by: INTERNAL MEDICINE

## 2017-11-23 PROCEDURE — 82977 ASSAY OF GGT: CPT | Performed by: INTERNAL MEDICINE

## 2017-11-23 PROCEDURE — 85025 COMPLETE CBC W/AUTO DIFF WBC: CPT | Performed by: INTERNAL MEDICINE

## 2017-11-23 PROCEDURE — 74011000250 HC RX REV CODE- 250: Performed by: INTERNAL MEDICINE

## 2017-11-23 RX ORDER — PANTOPRAZOLE SODIUM 40 MG/1
40 TABLET, DELAYED RELEASE ORAL
Status: DISCONTINUED | OUTPATIENT
Start: 2017-11-23 | End: 2017-11-26 | Stop reason: HOSPADM

## 2017-11-23 RX ORDER — INSULIN LISPRO 100 [IU]/ML
INJECTION, SOLUTION INTRAVENOUS; SUBCUTANEOUS
Status: DISCONTINUED | OUTPATIENT
Start: 2017-11-23 | End: 2017-11-26 | Stop reason: HOSPADM

## 2017-11-23 RX ORDER — HYDROCODONE BITARTRATE AND ACETAMINOPHEN 5; 325 MG/1; MG/1
1 TABLET ORAL
Status: DISCONTINUED | OUTPATIENT
Start: 2017-11-23 | End: 2017-11-25

## 2017-11-23 RX ORDER — ALPRAZOLAM 0.5 MG/1
0.5 TABLET ORAL 3 TIMES DAILY
Status: DISCONTINUED | OUTPATIENT
Start: 2017-11-23 | End: 2017-11-26 | Stop reason: HOSPADM

## 2017-11-23 RX ORDER — INSULIN LISPRO 100 [IU]/ML
10 INJECTION, SOLUTION INTRAVENOUS; SUBCUTANEOUS ONCE
Status: COMPLETED | OUTPATIENT
Start: 2017-11-23 | End: 2017-11-23

## 2017-11-23 RX ORDER — MAGNESIUM SULFATE HEPTAHYDRATE 40 MG/ML
2 INJECTION, SOLUTION INTRAVENOUS ONCE
Status: DISCONTINUED | OUTPATIENT
Start: 2017-11-23 | End: 2017-11-23

## 2017-11-23 RX ORDER — INSULIN LISPRO 100 [IU]/ML
INJECTION, SOLUTION INTRAVENOUS; SUBCUTANEOUS EVERY 6 HOURS
Status: DISCONTINUED | OUTPATIENT
Start: 2017-11-23 | End: 2017-11-23

## 2017-11-23 RX ORDER — POTASSIUM CHLORIDE 7.45 MG/ML
10 INJECTION INTRAVENOUS ONCE
Status: DISCONTINUED | OUTPATIENT
Start: 2017-11-23 | End: 2017-11-23

## 2017-11-23 RX ORDER — LANOLIN ALCOHOL/MO/W.PET/CERES
400 CREAM (GRAM) TOPICAL DAILY
Status: DISCONTINUED | OUTPATIENT
Start: 2017-11-23 | End: 2017-11-24

## 2017-11-23 RX ORDER — GABAPENTIN 300 MG/1
300 CAPSULE ORAL 3 TIMES DAILY
Status: DISCONTINUED | OUTPATIENT
Start: 2017-11-23 | End: 2017-11-26 | Stop reason: HOSPADM

## 2017-11-23 RX ORDER — MAGNESIUM SULFATE 1 G/100ML
1 INJECTION INTRAVENOUS ONCE
Status: DISCONTINUED | OUTPATIENT
Start: 2017-11-23 | End: 2017-11-23

## 2017-11-23 RX ORDER — MAGNESIUM SULFATE 100 %
4 CRYSTALS MISCELLANEOUS AS NEEDED
Status: DISCONTINUED | OUTPATIENT
Start: 2017-11-23 | End: 2017-11-23 | Stop reason: SDUPTHER

## 2017-11-23 RX ORDER — VENLAFAXINE HYDROCHLORIDE 37.5 MG/1
75 CAPSULE, EXTENDED RELEASE ORAL
Status: DISCONTINUED | OUTPATIENT
Start: 2017-11-23 | End: 2017-11-26 | Stop reason: HOSPADM

## 2017-11-23 RX ORDER — POTASSIUM CHLORIDE 20 MEQ/1
20 TABLET, EXTENDED RELEASE ORAL 2 TIMES DAILY
Status: COMPLETED | OUTPATIENT
Start: 2017-11-23 | End: 2017-11-23

## 2017-11-23 RX ORDER — DEXTROSE 50 % IN WATER (D50W) INTRAVENOUS SYRINGE
12.5-25 AS NEEDED
Status: DISCONTINUED | OUTPATIENT
Start: 2017-11-23 | End: 2017-11-23 | Stop reason: SDUPTHER

## 2017-11-23 RX ORDER — INSULIN GLARGINE 100 [IU]/ML
25 INJECTION, SOLUTION SUBCUTANEOUS DAILY
Status: DISCONTINUED | OUTPATIENT
Start: 2017-11-24 | End: 2017-11-24

## 2017-11-23 RX ORDER — POTASSIUM CHLORIDE 20 MEQ/1
40 TABLET, EXTENDED RELEASE ORAL
Status: COMPLETED | OUTPATIENT
Start: 2017-11-23 | End: 2017-11-23

## 2017-11-23 RX ORDER — INSULIN GLARGINE 100 [IU]/ML
22 INJECTION, SOLUTION SUBCUTANEOUS DAILY
Status: DISCONTINUED | OUTPATIENT
Start: 2017-11-23 | End: 2017-11-23

## 2017-11-23 RX ORDER — FENTANYL CITRATE 50 UG/ML
25 INJECTION, SOLUTION INTRAMUSCULAR; INTRAVENOUS
Status: DISCONTINUED | OUTPATIENT
Start: 2017-11-23 | End: 2017-11-26 | Stop reason: HOSPADM

## 2017-11-23 RX ORDER — INSULIN LISPRO 100 [IU]/ML
5 INJECTION, SOLUTION INTRAVENOUS; SUBCUTANEOUS ONCE
Status: COMPLETED | OUTPATIENT
Start: 2017-11-23 | End: 2017-11-23

## 2017-11-23 RX ADMIN — SODIUM CHLORIDE 0.3 MCG/KG/HR: 900 INJECTION, SOLUTION INTRAVENOUS at 04:15

## 2017-11-23 RX ADMIN — ALPRAZOLAM 0.5 MG: 0.5 TABLET ORAL at 16:55

## 2017-11-23 RX ADMIN — PIPERACILLIN SODIUM,TAZOBACTAM SODIUM 3.38 G: 3; .375 INJECTION, POWDER, FOR SOLUTION INTRAVENOUS at 00:03

## 2017-11-23 RX ADMIN — ALPRAZOLAM 0.5 MG: 0.5 TABLET ORAL at 10:47

## 2017-11-23 RX ADMIN — FENTANYL CITRATE 25 MCG: 50 INJECTION, SOLUTION INTRAMUSCULAR; INTRAVENOUS at 22:22

## 2017-11-23 RX ADMIN — MUPIROCIN: 20 OINTMENT TOPICAL at 18:00

## 2017-11-23 RX ADMIN — INSULIN LISPRO 5 UNITS: 100 INJECTION, SOLUTION INTRAVENOUS; SUBCUTANEOUS at 22:45

## 2017-11-23 RX ADMIN — HYDROCODONE BITARTRATE AND ACETAMINOPHEN 1 TABLET: 5; 325 TABLET ORAL at 15:05

## 2017-11-23 RX ADMIN — MAGNESIUM SULFATE HEPTAHYDRATE: 500 INJECTION, SOLUTION INTRAMUSCULAR; INTRAVENOUS at 06:48

## 2017-11-23 RX ADMIN — MUPIROCIN: 20 OINTMENT TOPICAL at 08:47

## 2017-11-23 RX ADMIN — PIPERACILLIN SODIUM,TAZOBACTAM SODIUM 3.38 G: 3; .375 INJECTION, POWDER, FOR SOLUTION INTRAVENOUS at 23:57

## 2017-11-23 RX ADMIN — HYDROCODONE BITARTRATE AND ACETAMINOPHEN 1 TABLET: 5; 325 TABLET ORAL at 08:44

## 2017-11-23 RX ADMIN — INSULIN LISPRO 9 UNITS: 100 INJECTION, SOLUTION INTRAVENOUS; SUBCUTANEOUS at 10:53

## 2017-11-23 RX ADMIN — INSULIN GLARGINE 22 UNITS: 100 INJECTION, SOLUTION SUBCUTANEOUS at 09:42

## 2017-11-23 RX ADMIN — ENOXAPARIN SODIUM 40 MG: 40 INJECTION SUBCUTANEOUS at 10:47

## 2017-11-23 RX ADMIN — FENTANYL CITRATE 25 MCG: 50 INJECTION, SOLUTION INTRAMUSCULAR; INTRAVENOUS at 13:56

## 2017-11-23 RX ADMIN — HYDROCODONE BITARTRATE AND ACETAMINOPHEN 1 TABLET: 5; 325 TABLET ORAL at 21:09

## 2017-11-23 RX ADMIN — POTASSIUM CHLORIDE 20 MEQ: 20 TABLET, EXTENDED RELEASE ORAL at 18:01

## 2017-11-23 RX ADMIN — GABAPENTIN 300 MG: 300 CAPSULE ORAL at 09:55

## 2017-11-23 RX ADMIN — INSULIN LISPRO 2 UNITS: 100 INJECTION, SOLUTION INTRAVENOUS; SUBCUTANEOUS at 00:03

## 2017-11-23 RX ADMIN — INSULIN LISPRO 5 UNITS: 100 INJECTION, SOLUTION INTRAVENOUS; SUBCUTANEOUS at 06:31

## 2017-11-23 RX ADMIN — POTASSIUM CHLORIDE 20 MEQ: 20 TABLET, EXTENDED RELEASE ORAL at 09:43

## 2017-11-23 RX ADMIN — FENTANYL CITRATE 25 MCG: 50 INJECTION, SOLUTION INTRAMUSCULAR; INTRAVENOUS at 18:01

## 2017-11-23 RX ADMIN — FENTANYL CITRATE 25 MCG: 50 INJECTION, SOLUTION INTRAMUSCULAR; INTRAVENOUS at 09:55

## 2017-11-23 RX ADMIN — INSULIN LISPRO 10 UNITS: 100 INJECTION, SOLUTION INTRAVENOUS; SUBCUTANEOUS at 20:29

## 2017-11-23 RX ADMIN — PIPERACILLIN SODIUM,TAZOBACTAM SODIUM 3.38 G: 3; .375 INJECTION, POWDER, FOR SOLUTION INTRAVENOUS at 08:44

## 2017-11-23 RX ADMIN — PANTOPRAZOLE SODIUM 40 MG: 40 TABLET, DELAYED RELEASE ORAL at 10:47

## 2017-11-23 RX ADMIN — GABAPENTIN 300 MG: 300 CAPSULE ORAL at 22:21

## 2017-11-23 RX ADMIN — PIPERACILLIN SODIUM,TAZOBACTAM SODIUM 3.38 G: 3; .375 INJECTION, POWDER, FOR SOLUTION INTRAVENOUS at 16:54

## 2017-11-23 RX ADMIN — POTASSIUM CHLORIDE 40 MEQ: 20 TABLET, EXTENDED RELEASE ORAL at 22:52

## 2017-11-23 RX ADMIN — VENLAFAXINE HYDROCHLORIDE 75 MG: 37.5 CAPSULE, EXTENDED RELEASE ORAL at 10:47

## 2017-11-23 RX ADMIN — ALPRAZOLAM 0.5 MG: 0.5 TABLET ORAL at 22:21

## 2017-11-23 RX ADMIN — GABAPENTIN 300 MG: 300 CAPSULE ORAL at 16:55

## 2017-11-23 RX ADMIN — Medication 400 MG: at 09:43

## 2017-11-23 NOTE — PROGRESS NOTES
[facility] Consult Service Progress NOTE  Pulmonary, Critical Care, and Sleep Medicine    Name: Kee Dominguez MRN: [id number]   : 1970 Hospital: [facility]   Date: 2017  Admission Date: 2017     Chart and notes reviewed. Data reviewed. Pt seen on rounds earlier today. I have evaluated and examined the patient. Pt is acutely ill. Reviewed the evaluation and will assist in implementing the plan as outlined by Dr. Erica Ahumada and team    Hospital Day: 4    I was asked by Gaudencio Valdovinos MD to see Kee Dominguez in consultation for a chief complaint of Acute encephalopathy    Ofelia Daniel is a 56 y.o.  female with Type 1 DM who presents with DKA. Patient was dropped off the ER by someone who said that they will just \"park their vehicle\" but they never came back. Patient found with multiple bruises on exam and labs consistent with DKA, DEBORAH and hyponatremia. Patient is not able to provide a history. Stat CT head negative. Pt was very agitated in the ER. Given dose of IV ativan and now somnolent in the CCU, unable to give meaningful ROS.  still hypersonolent but no distress. Patient is drowsy but responds to voice. Does not always follow commands. Later in AM agitated, restless and not easily redirected. Resists staff physically. IVprecedex started     off insulin drip. BS still labile. No chest pain. No HA. Claims she fell at home. Has painful legs. Saw Dr. Darek Silva recently? On low dose IV precedex and now conversant. Very weak    IMPRESSION:   1. Acute metabolic encephalopathy. CT head negative. Likely metabolic- hyperosmolar state  2. Multiple bruises -forensic nurse eval on hold until patient's mental status improves and she can consent to exam.  unusual pattern of bruising  3. Urine drug screen noted- had benzos; chronic pain  4. Severe DKA in Type 1, uncontrolled- still labile off insulin drip.  Has peripheral neuropathy- painful  5. Possible sepsis due to PNA (? Aspiration)  6. DEBORAH  7. Hypokalemia  8. hypophosphatemia  9. hypomagnesemia  10. Hypotension- better  11. Volume depletion  12. Tobacco use  13. Anxiety and depression  14. Established problem of diabetes is worsening with threat/risk of bodily function  15. Pt is requiring Drug therapy requiring intensive monitoring for toxicity  16. Pt is critically ill and at high risk of end organ dysfunction and failure      RECOMMENDATIONS/PLAN:   1. ICU monitoring for another day  2. Wean off IV precedex for safety  3. Follow K and Phos closely-  4. Zosyn for possible aspiration PNA. 5. Follow up on cultures  6. PT, OT  7. Pt will need HH for home safety assessment- falls, etc  8. Pt education  9. Pt needs IV fluids with additives and Drug therapy requiring intensive monitoring for toxicity  10. Prescription drug management with home med reconciliation done  11. DVT, SUP prophylaxis. 12. Will be available to assist in medical management while in the CCU pending disposition     My assessment/management was discussed with:  Nursing XX    respiratory therapy Dr.   family      Pt's condition is unstable and unpredictable. This care involved high complexity decision making which includes independently reviewing the patient's past medical records, current laboratory results, medication profiles that were immediately available to me and actual Xray images at the bedside in order to assess, support vital system function, and to treat this degree of vital organ system failure, and to prevent further life threatening deterioration of the patients condition. I was in direct communication with the nursing staff throughout this time. I have personally and independently reviewed the patients interval lab, diagnostic data, radiographs and the reports.     I have ordered additional labs to follow the current medical conditions and to monitor treatment responses over the next 24 hours or sooner if needed. I will order additional imaging to follow longitudinal changes found on the most current imaging. Risk of deterioration: high   [x] High complexity decision making was performed  [x] See my orders for details  Tubes:     ICU disposition :Unchanged. Code: Full Code        Hemodynamics:    CO:    CI:    CVP:    SVR:   PAP Systolic:    PAP Diastolic:    PVR:    HA10:        Ventilator Settings:      Mode Rate TV Press PEEP FiO2 PIP Min. Vent                            Vital Signs: Intake/Output: Intake/Output:   Temp (24hrs), Av.3 °F (36.8 °C), Min:98 °F (36.7 °C), Max:98.5 °F (36.9 °C)     Visit Vitals    /61 (BP 1 Location: Right arm, BP Patient Position: At rest)    Pulse (!) 56    Temp 98.3 °F (36.8 °C)    Resp (!) 31    Wt 75.3 kg (166 lb 0.1 oz)    SpO2 97%    BMI 28.05 kg/m2        Telemetry:    normal sinus rhythm   O2 Device: Room air  O2 Flow Rate (L/min): 2 l/min  Wt Readings from Last 4 Encounters:   17 75.3 kg (166 lb 0.1 oz)   17 78.9 kg (174 lb)   17 82.1 kg (181 lb 1.6 oz)   17 85.8 kg (189 lb 1.6 oz)          Intake/Output Summary (Last 24 hours) at 17 0923  Last data filed at 17 0908   Gross per 24 hour   Intake             3466 ml   Output             2525 ml   Net              941 ml     Last shift:       07 - 1900  In: 1635 [P.O.:1320]  Out: -   Last 3 shifts: 1901 -  07  In: 3338.3 [P.O.:390; I.V.:2948.3]  Out: 4850 [Urine:4850]     Physical Exam:    General: severely ill WF   HEAD: Normocephalic, without obvious abnormality, atraumatic; ear piercings   EYES: conjuctivae clear. AN Icteric sclerae   NOSE: Nares normal;    THROAT:  mucous membranes dry , poor Oral hygiene; No Thrush; class 3 airway; Tongue midline   Neck: Supple, symmetrical, trachea midline, No accessory mm use, No Stridor/ cuff leak   LYMPH: No abnormally enlarged lymph nodes.     Chest: normal   Lungs: normal air entry   Heart: Regular rate and rhythm   Abdomen: soft, non-tender, without masses or organomegaly   :      Rankin   Extremity: negative, clubbing    Neuro: lethargic but easily agitated and resistant to staff, disoriented   Psych: unable to assess   Skin: Turgor absent;    Pulses:Bilateral, Radial, 2+   Capillary refill: abnormal:  sluggish capillary refill,    Tubes:   Rankin    DATA:    Interval lab and diagnostic data was reviewed. Interval radiology images were independently viewed and available reports were reviewed. All lab results for the last 24 hours reviewed.     MAR reviewed and pertinent medications noted or modified as needed  MEDS:   Current Facility-Administered Medications   Medication    insulin glargine (LANTUS) injection 22 Units    HYDROcodone-acetaminophen (NORCO) 5-325 mg per tablet 1 Tab    potassium chloride SR (KLOR-CON 10) tablet 20 mEq    magnesium oxide (MAG-OX) tablet 400 mg    insulin lispro (HUMALOG) injection    albuterol-ipratropium (DUO-NEB) 2.5 MG-0.5 MG/3 ML    dexmedeTOMidine (PRECEDEX) 400 mcg in 0.9% sodium chloride 100 mL infusion    glucose chewable tablet 16 g    dextrose (D50W) injection syrg 12.5-25 g    glucagon (GLUCAGEN) injection 1 mg    enoxaparin (LOVENOX) injection 40 mg    labetalol (NORMODYNE;TRANDATE) injection 10 mg    LORazepam (ATIVAN) injection 0.5 mg    sodium chloride (NS) flush 5-10 mL    acetaminophen (TYLENOL) suppository 650 mg    ondansetron (ZOFRAN) injection 4 mg    piperacillin-tazobactam (ZOSYN) 3.375 g in 0.9% sodium chloride (MBP/ADV) 100 mL    mupirocin (BACTROBAN) 2 % ointment        Labs:    Recent Labs      11/23/17   0505  11/20/17   1206   WBC  8.6  12.2*   HGB  10.1*  10.5*   PLT  259  296     Recent Labs      11/23/17   0645  11/22/17   1055  11/22/17   0528   11/20/17   1206   NA  142  143  144   < >  124*   K  3.6  3.4*  2.8*   < >  4.7   CL  105  104  107   < >  85*   CO2  25  26  27   < >  10*   GLU  297* 148*  137*   < >  435*   BUN  8  6  6   < >  63*   CREA  1.10*  1.04*  0.99   < >  2.16*   CA  7.6*  7.7*  7.4*   < >  8.5   MG  1.8  1.0*  1.0*   < >   --    PHOS  3.2  1.2*  1.2*   < >   --    LAC   --    --    --    --   1.8   ALB  2.2*   --    --    --   2.5*   SGOT  74*   --    --    --   392*   ALT  87*   --    --    --   152*    < > = values in this interval not displayed. No results for input(s): PH, PCO2, PO2, HCO3, FIO2 in the last 72 hours. Recent Labs      11/20/17   1206   TROIQ  <0.04     No results found for: BNPP, BNP   Lab Results   Component Value Date/Time    Culture result: NO GROWTH AFTER 20 HOURS 11/22/2017 09:58 AM    Culture result:  11/20/2017 12:06 PM     STAPHYLOCOCCUS SPECIES, COAGULASE NEGATIVE GROWING IN 1 OF 2 BOTTLES DRAWN ( SITE = L AC) PLATES HELD 3 DAYS. CALL MICROBIOLOGY LAB IF SENSITIVITIES ARE NEEDED.     Culture result: REMAINING BOTTLE(S) HAS/HAVE NO GROWTH SO FAR 11/20/2017 12:06 PM    Culture result: NO GROWTH 3 DAYS 11/20/2017 12:06 PM     No results found for: VANCT, CPK  Lab Results   Component Value Date/Time    Color YELLOW/STRAW 11/20/2017 12:34 PM    Appearance CLOUDY 11/20/2017 12:34 PM    pH (UA) 5.0 11/20/2017 12:34 PM    Protein 30 11/20/2017 12:34 PM    Glucose 250 11/20/2017 12:34 PM    Ketone 40 11/20/2017 12:34 PM    Bilirubin NEGATIVE  05/22/2010 05:15 PM    Blood LARGE 11/20/2017 12:34 PM    Urobilinogen 0.2 11/20/2017 12:34 PM    Nitrites NEGATIVE  11/20/2017 12:34 PM    Leukocyte Esterase NEGATIVE  11/20/2017 12:34 PM    WBC 0-4 11/20/2017 12:34 PM    RBC 0-5 11/20/2017 12:34 PM    Bacteria NEGATIVE  11/20/2017 12:34 PM       No results found for: IRON, FE, TIBC, IBCT, PSAT, FERR  Lab Results   Component Value Date/Time    TSH 4.75 11/23/2017 06:45 AM      Lab Results   Component Value Date/Time    Hepatitis B surface Ag <0.10 10/22/2014 11:30 AM       Imaging:      Results from Hospital Encounter encounter on 11/20/17   XR CHEST PORT   Narrative EXAM: XR CHEST PORT    INDICATION:  Sepsis    COMPARISON:  11/7/2012    FINDINGS:    A portable AP radiograph of the chest was obtained at 13:56 hours. The patient  is on a cardiac monitor. There is patchy bibasilar airspace disease and mild  vascular congestion. The pattern is more suggestive of pulmonary edema than of  pneumonia, although bilateral pneumonia cannot be excluded. There is no pleural  fluid. The cardiac silhouette is normal in size. The bones and soft tissues  are unremarkable. Impression IMPRESSION:     Bibasilar airspace disease and vascular congestion suggesting pulmonary edema. Different diagnosis of pneumonia. Results from [place] encounter on 11/20/17   CT HEAD WO CONT   Narrative EXAM:  CT HEAD WITHOUT CONTRAST  INDICATION: Trauma. COMPARISON: None. CONTRAST: None. TECHNIQUE: Unenhanced CT of the head was performed using 5 mm images. Brain and  bone windows were generated. Sagittal and coronal reformations were generated. CT dose reduction was achieved through use of a standardized protocol tailored  for this examination and automatic exposure control for dose modulation. CT dose  reduction was achieved through use of a standardized protocol tailored for this  examination and automatic exposure control for dose modulation. FINDINGS:  The ventricles and sulci are normal in size, shape and configuration and  midline. There is no significant white matter disease. There is no  intracranial hemorrhage. There is no extra-axial collection, mass, mass effect  or midline shift. The basilar cisterns are open. No acute infarct is  identified. The bone windows demonstrate no abnormalities. The visualized  portions of the paranasal sinuses and mastoid air cells are clear. Impression IMPRESSION: Normal unenhanced CT examination of the head.             Radha Zhao MD

## 2017-11-23 NOTE — PROGRESS NOTES
Chief Complaint: altered mental status    Back to baseline. Does not have a recollection of what happened to her. Denies drinking and other activities that may have led to her altered mentation. She does not know what may have happened. Assesment and Plan    1. Encephalopathy acute   Non focal neuro exam. May have had a seizure she has no meaningful input to what could have happened. MRI, EEG      2.  Diabetic ketoacidosis without coma associated with type 1 diabetes mellitus   Continue hydration/insulin      3. Acute renal failure, unspecified acute renal failure type ([facility] [address]  On fluids      4. PTSD   On Effexor and xanax     5. Tobacco abuse  Advised on the dangers of tobacco abuse  Advised on the benefits of cessation    6.  Diabetic neuropathy  On neurontin      Allergies  Zocor [simvastatin] and Lisinopril     Medications  Current Facility-Administered Medications   Medication Dose Route Frequency    insulin glargine (LANTUS) injection 22 Units  22 Units SubCUTAneous DAILY    HYDROcodone-acetaminophen (NORCO) 5-325 mg per tablet 1 Tab  1 Tab Oral Q6H PRN    potassium chloride (K-DUR, KLOR-CON) SR tablet 20 mEq  20 mEq Oral BID    magnesium oxide (MAG-OX) tablet 400 mg  400 mg Oral DAILY    insulin lispro (HUMALOG) injection   SubCUTAneous AC&HS    fentaNYL citrate (PF) injection 25 mcg  25 mcg IntraVENous Q4H PRN    gabapentin (NEURONTIN) capsule 300 mg  300 mg Oral TID    ALPRAZolam (XANAX) tablet 0.5 mg  0.5 mg Oral TID    pantoprazole (PROTONIX) tablet 40 mg  40 mg Oral ACB    venlafaxine-SR (EFFEXOR-XR) capsule 75 mg  75 mg Oral DAILY WITH BREAKFAST    albuterol-ipratropium (DUO-NEB) 2.5 MG-0.5 MG/3 ML  3 mL Nebulization Q6H PRN    dexmedeTOMidine (PRECEDEX) 400 mcg in 0.9% sodium chloride 100 mL infusion  0.2-0.7 mcg/kg/hr IntraVENous TITRATE    glucose chewable tablet 16 g  4 Tab Oral PRN    dextrose (D50W) injection syrg 12.5-25 g  12.5-25 g IntraVENous PRN    glucagon (GLUCAGEN) injection 1 mg  1 mg IntraMUSCular PRN    enoxaparin (LOVENOX) injection 40 mg  40 mg SubCUTAneous Q24H    labetalol (NORMODYNE;TRANDATE) injection 10 mg  10 mg IntraVENous Q6H PRN    LORazepam (ATIVAN) injection 0.5 mg  0.5 mg IntraVENous Q6H PRN    sodium chloride (NS) flush 5-10 mL  5-10 mL IntraVENous PRN    acetaminophen (TYLENOL) suppository 650 mg  650 mg Rectal Q4H PRN    ondansetron (ZOFRAN) injection 4 mg  4 mg IntraVENous Q4H PRN    piperacillin-tazobactam (ZOSYN) 3.375 g in 0.9% sodium chloride (MBP/ADV) 100 mL  3.375 g IntraVENous Q8H    mupirocin (BACTROBAN) 2 % ointment   Both Nostrils BID        Medical History  Past Medical History:   Diagnosis Date    Achilles tendon rupture     along with torn right patellar/femoral ligament    Arthritis     rt. foot    Chronic pain     abdominal pain    Diabetes (HCC)     IDDM on insulin pump and sensor    DM type 1 (diabetes mellitus, type 1) (HCC)     age 25    Endometriosis     s/p ex-lap 4x    Gastric ulcer     GERD (gastroesophageal reflux disease)     Herpes     Other and unspecified hyperlipidemia     Panic attacks     Psychiatric disorder     anxiety, panic attacks    PTSD (post-traumatic stress disorder)     Unspecified essential hypertension      Review of Systems   Constitutional: Negative for chills and fever. HENT: Negative for ear pain. Eyes: Negative for pain and discharge. Respiratory: Negative for cough and hemoptysis. Cardiovascular: Negative for chest pain and claudication. Gastrointestinal: Negative for constipation and diarrhea. Genitourinary: Negative for flank pain and hematuria. Musculoskeletal: Positive for joint pain and myalgias. Negative for back pain. Skin: Negative for itching and rash. Neurological: Positive for sensory change. Endo/Heme/Allergies: Negative for environmental allergies. Does not bruise/bleed easily. Psychiatric/Behavioral: Negative for depression and hallucinations. Exam:    Visit Vitals    /75    Pulse 71    Temp 98.3 °F (36.8 °C)    Resp 23    Wt 166 lb 0.1 oz (75.3 kg)    SpO2 97%    BMI 28.05 kg/m2      General: Well developed, well nourished. Patient in no apparent distress   Head: Normocephalic, atraumatic, anicteric sclera   Neck Normal ROM, No thyromegally   Lungs:  Clear to auscultation bilaterally, No wheezes or rubs   Cardiac: Regular rate and rhythm with no murmurs. Abd: Bowel sounds were audible. No tenderness on palpation   Ext: No pedal edema   Skin: Supple no rash     NeurologicExam:  Mental Status: Alert and oriented to person place and time   Speech: Fluent no aphasia or dysarthria. Cranial Nerves:  II - XII Inact   Motor:  Full and symmetric strength of upper and lower proximal and distal muscles. Normal bulk and tone. Reflexes:   Deep tendon reflexes 0+/4 and symmetric. Sensory:   Symmetric and intact with no perceived deficits modalities involving small or large fibers. Tremor:   No tremor noted. Cerebellar:  Coordination intact. Neurovascular: No carotid bruits. No JVD           Imaging    CT Results (most recent):    Results from Hospital Encounter encounter on 11/20/17   CT HEAD WO CONT   Narrative EXAM:  CT HEAD WITHOUT CONTRAST  INDICATION: Trauma. COMPARISON: None. CONTRAST: None. TECHNIQUE: Unenhanced CT of the head was performed using 5 mm images. Brain and  bone windows were generated. Sagittal and coronal reformations were generated. CT dose reduction was achieved through use of a standardized protocol tailored  for this examination and automatic exposure control for dose modulation. CT dose  reduction was achieved through use of a standardized protocol tailored for this  examination and automatic exposure control for dose modulation. FINDINGS:  The ventricles and sulci are normal in size, shape and configuration and  midline. There is no significant white matter disease.   There is no  intracranial hemorrhage. There is no extra-axial collection, mass, mass effect  or midline shift. The basilar cisterns are open. No acute infarct is  identified. The bone windows demonstrate no abnormalities. The visualized  portions of the paranasal sinuses and mastoid air cells are clear. Impression IMPRESSION: Normal unenhanced CT examination of the head.             Lab Review    Lab Results   Component Value Date/Time    WBC 8.6 11/23/2017 05:05 AM    HCT 30.9 11/23/2017 05:05 AM    HGB 10.1 11/23/2017 05:05 AM    PLATELET 376 30/46/7720 05:05 AM       Lab Results   Component Value Date/Time    Sodium 131 11/23/2017 09:27 AM    Potassium 3.7 11/23/2017 09:27 AM    Chloride 95 11/23/2017 09:27 AM    CO2 22 11/23/2017 09:27 AM    Glucose 371 11/23/2017 09:27 AM    BUN 8 11/23/2017 09:27 AM    Creatinine 1.05 11/23/2017 09:27 AM    Calcium 6.7 11/23/2017 09:27 AM       Lab Results   Component Value Date/Time    Hemoglobin A1c 8.8 11/21/2017 05:18 AM        Lab Results   Component Value Date/Time    Vitamin B12 461 06/25/2015 09:15 AM    Folate 32.0 06/25/2015 09:15 AM       Lab Results   Component Value Date/Time    Cholesterol, total 133 09/28/2017 02:10 PM    HDL Cholesterol 72 09/28/2017 02:10 PM    LDL,Direct 64 10/22/2014 11:30 AM    LDL, calculated 37 09/28/2017 02:10 PM    VLDL, calculated 24 09/28/2017 02:10 PM    Triglyceride 122 09/28/2017 02:10 PM    CHOL/HDL Ratio 2.3 02/21/2017 10:35 AM

## 2017-11-23 NOTE — CONSULTS
Consults         NEUROLOGY CONSULT    NAME Deep Ba AGE 56 y.o. MRN [id number]  1970     REQUESTING PHYSICIAN: Jeffery Parker MD      CHIEF COMPLAINT:  Altered mental status     This is a 56 y.o. female with a history of uncontrolled type I diabetes. She had been feeling ill for a few days. Her boyfriend dropped her off at the ER at the urging of a long time fatherly figure (ex boyfriend's father). He gives most of the history as she is sedated. She is a long time compliant brittle diabetic and was on an insulin pump at one time.       ASSESSMENT AND PLAN      1. Encephalopathy acute   Non focal neuro exam.   MRI when cooperative and if she does not regain baseline  EEG      2. Diabetic ketoacidosis without coma associated with type 1 diabetes mellitus   Continue hydration/insulin     3. Acute renal failure, unspecified acute renal failure type ([facility] [address]  On fluids     4. PTSD   On precedex    5. Tobacco abuse    6. Diabetic neuropathy    7.  History of alcohol dependence    ALLERGIES:  Zocor [simvastatin] and Lisinopril     Current Facility-Administered Medications   Medication Dose Route Frequency    albuterol-ipratropium (DUO-NEB) 2.5 MG-0.5 MG/3 ML  3 mL Nebulization Q6H PRN    fentaNYL citrate (PF) injection 25-50 mcg  25-50 mcg IntraVENous Q1H PRN    0.45% sodium chloride 1,000 mL with magnesium sulfate 1 g, potassium phosphate 20.01 mmol infusion   IntraVENous CONTINUOUS    insulin glargine (LANTUS) injection 18 Units  18 Units SubCUTAneous DAILY    dexmedeTOMidine (PRECEDEX) 400 mcg in 0.9% sodium chloride 100 mL infusion  0.2-0.7 mcg/kg/hr IntraVENous TITRATE    insulin lispro (HUMALOG) injection   SubCUTAneous Q6H    glucose chewable tablet 16 g  4 Tab Oral PRN    dextrose (D50W) injection syrg 12.5-25 g  12.5-25 g IntraVENous PRN    glucagon (GLUCAGEN) injection 1 mg  1 mg IntraMUSCular PRN    enoxaparin (LOVENOX) injection 40 mg  40 mg SubCUTAneous Q24H    labetalol (NORMODYNE;TRANDATE) injection 10 mg  10 mg IntraVENous Q6H PRN    LORazepam (ATIVAN) injection 0.5 mg  0.5 mg IntraVENous Q6H PRN    sodium chloride (NS) flush 5-10 mL  5-10 mL IntraVENous PRN    insulin regular (NOVOLIN R, HUMULIN R) 100 Units in 0.9% sodium chloride 100 mL infusion  0-50 Units/hr IntraVENous TITRATE    acetaminophen (TYLENOL) suppository 650 mg  650 mg Rectal Q4H PRN    ondansetron (ZOFRAN) injection 4 mg  4 mg IntraVENous Q4H PRN    glucose chewable tablet 16 g  4 Tab Oral PRN    dextrose (D50W) injection syrg 12.5-25 g  12.5-25 g IntraVENous PRN    glucagon (GLUCAGEN) injection 1 mg  1 mg IntraMUSCular PRN    piperacillin-tazobactam (ZOSYN) 3.375 g in 0.9% sodium chloride (MBP/ADV) 100 mL  3.375 g IntraVENous Q8H    mupirocin (BACTROBAN) 2 % ointment   Both Nostrils BID       Past Medical History:   Diagnosis Date    Achilles tendon rupture     along with torn right patellar/femoral ligament    Arthritis     rt. foot    Chronic pain     abdominal pain    Diabetes (HCC)     IDDM on insulin pump and sensor    DM type 1 (diabetes mellitus, type 1) (HCC)     age 25    Endometriosis     s/p ex-lap 4x    Gastric ulcer     GERD (gastroesophageal reflux disease)     Herpes     Other and unspecified hyperlipidemia     Panic attacks     Psychiatric disorder     anxiety, panic attacks    PTSD (post-traumatic stress disorder)     Unspecified essential hypertension        Social History   Substance Use Topics    Smoking status: Current Every Day Smoker     Packs/day: 1.50     Years: 17.00    Smokeless tobacco: Never Used    Alcohol use Yes      Comment: a beer every few months       Family History   Problem Relation Age of Onset    Diabetes Mother      diet controlled    Diabetes Father     Heart Disease Paternal Uncle      MI in his 46s    Stroke Neg Hx      ROS  Unable to obtain because of her mental state        Visit Vitals    /83    Pulse 71    Temp 98.4 °F (36.9 °C)    Resp 25    Wt 166 lb 0.1 oz (75.3 kg)    SpO2 100%    BMI 28.05 kg/m2      General: Looks rough    Head: Normocephalic poor dentition, anicteric sclera   Neck Normal ROM,  No thyromegally   Lungs:  Clear to auscultation bilaterally, No wheezes or rubs   Cardiac: Regular rate and rhythm with no murmurs. Abd: Bowel sounds were audible. No tenderness on palpation   Ext: No pedal edema   Skin: Supple no rash multiple abrasions and tatoos      NeurologicExam:  Mental Status:     Speech: Non verbal follows simple commands. Knows hand but can't identify fingers or orient correctly to left and right sides   Cranial Nerves:  II-XII intact   Motor:  Full and symmetric strength of upper and lower proximal and distal muscles. Normal bulk and tone. Reflexes:   Deep tendon reflexes 0+/4 and symmetric. Sensory:   Responds to touch . Tremor:   No tremor noted. Cerebellar:  Coordination grossly intact. Neurovascular: No carotid bruits. No JVD          REVIEWED IMAGING:        CT:    Results from Hospital Encounter encounter on 11/20/17   CT HEAD WO CONT   Narrative EXAM:  CT HEAD WITHOUT CONTRAST  INDICATION: Trauma. COMPARISON: None. CONTRAST: None. TECHNIQUE: Unenhanced CT of the head was performed using 5 mm images. Brain and  bone windows were generated. Sagittal and coronal reformations were generated. CT dose reduction was achieved through use of a standardized protocol tailored  for this examination and automatic exposure control for dose modulation. CT dose  reduction was achieved through use of a standardized protocol tailored for this  examination and automatic exposure control for dose modulation. FINDINGS:  The ventricles and sulci are normal in size, shape and configuration and  midline. There is no significant white matter disease. There is no  intracranial hemorrhage. There is no extra-axial collection, mass, mass effect  or midline shift. The basilar cisterns are open.   No acute infarct is  identified. The bone windows demonstrate no abnormalities. The visualized  portions of the paranasal sinuses and mastoid air cells are clear. Impression IMPRESSION: Normal unenhanced CT examination of the head.           REVIEWED LABS:  Lab Results   Component Value Date/Time    WBC 12.2 11/20/2017 12:06 PM    HCT 32.3 11/20/2017 12:06 PM    HGB 10.5 11/20/2017 12:06 PM    PLATELET 928 03/65/2005 12:06 PM     Lab Results   Component Value Date/Time    Sodium 143 11/22/2017 10:55 AM    Potassium 3.4 11/22/2017 10:55 AM    Chloride 104 11/22/2017 10:55 AM    CO2 26 11/22/2017 10:55 AM    Glucose 148 11/22/2017 10:55 AM    BUN 6 11/22/2017 10:55 AM    Creatinine 1.04 11/22/2017 10:55 AM    Calcium 7.7 11/22/2017 10:55 AM     Lab Results   Component Value Date/Time    Vitamin B12 461 06/25/2015 09:15 AM    Folate 32.0 06/25/2015 09:15 AM     Lab Results   Component Value Date/Time    LDL,Direct 64 10/22/2014 11:30 AM    LDL, calculated 37 09/28/2017 02:10 PM     Lab Results   Component Value Date/Time    Hemoglobin A1c 8.8 11/21/2017 05:18 AM     No components found for: TROPQUANT  No results found for: ETHEL

## 2017-11-23 NOTE — PROGRESS NOTES
Spiritual Care Assessment/Progress Notes    Evelyne Cisneros [id number]  [id number]    1970  56 y.o.  female    Patient Telephone Number: [phone] (home)   [employer] Affiliation: Temple   Language: English   Extended Emergency Contact Information  Primary Emergency Contact: [address] [place] [address] Phone: [phone]  Relation: Friend   Patient Active Problem List    Diagnosis Date Noted    [facility] 11/20/2017    Anxiety and depression 11/14/2017    Positive blood culture 11/07/2012    Tobacco abuse 11/07/2012    Elevated LFTs 09/06/2012    Abnormal weight gain 12/22/2011    Endometriosis 03/22/2011    Hyperlipidemia LDL goal <100     Uncontrolled type I diabetes mellitus with neuropathy ([facility]     Essential hypertension, benign         Date: 11/23/2017       Level of Methodist/Spiritual Activity:  [x]         Involved in kristy tradition/spiritual practice    []         Not involved in kristy tradition/spiritual practice  []         Spiritually oriented    []         Claims no spiritual orientation    []         seeking spiritual identity  []         Feels alienated from Lutheran practice/tradition  []         Feels angry about Lutheran practice/tradition  [x]         Spirituality/Lutheran tradition may be a resource for coping at this time.   []         Not able to assess due to medical condition    Services Provided Today:  []         crisis intervention    []         reading Scriptures  [x]         spiritual assessment    []         prayer  [x]         empathic listening/emotional support  []         rites and rituals (cite in comments)  []         life review     []         Lutheran support  []         theological development    []         advocacy  []         ethical dialog     []         blessing  []         bereavement support    []         support to family  []         anticipatory grief support   []         help with AMD  []         spiritual guidance    []         meditation      Spiritual Care Needs  []         Emotional Support  []         Spiritual/Restorationist Care  []         Loss/Adjustment  []         Advocacy/Referral                /Ethics  [x]         No needs expressed at               this time  []         Other: (note in               comments)  [address]  []         Follow up visits with               pt/family  []         Provide materials  []         Schedule sacraments  []         Contact Community               Clergy  [x]         Follow up as needed  []         Other: (note in               comments)     Comments: Initial visit with patient in [phone]. Introduced self and explained role of chaplains in the hospital. Provided empathetic listening and supportive presence as patient provided insight in to family dynamics and shared her sadness at not being able to be with her family on Thanksgiving. Spent time discussing how the holidays are not the same without the presence of family and that she has not seen her brother in four years but did not reveal any reasons as to the lack of connection. Patient has no specific spiritual care needs at this time but appreciates  visits and reiterated her loneliness. Advised of  availability and assured of continued support as needed or desired.  LARRY De SouzaDiv.    Paging Service [phone])

## 2017-11-23 NOTE — PROGRESS NOTES
1900 Verbal report received from Dina Oliva RN    2000 Assessment complete. Patient is alert to self and situation. Patient is drowsy but arouses to voice. Pupils are equal and reactive to light. Lung sounds are coarse. Bowel sounds are active. Pulses are palpable. Multiple bruises and abrasions all over. Bed alarm is on and working. Sitter at bedside. PIV x2 with Precedex infusing @ 0.5mcg and 1/2 Normal Saline with 1gm Magnesium and 20mmol of Potassium phosphate infusing @ 75mL/hr. Significant other is at bedside. 0000 Reassessment complete. No changes from prevous    0400 Reassessment complete. Patient is more alert now and able to answer questions appropriately. Is asking for something to drink. When asked patient did remember coming into the hospital.    0500 Precedex gtt decreased to 0.3 mcg. Will continue to monitor.     0700 Verbal report given to Dina Oliva RN

## 2017-11-23 NOTE — PROGRESS NOTES
Hospitalist Progress Note    NAME: Corrie Wu   :  1970   MRN:  [id number]       Assessment / Plan:  Severe DKA in Type 1, uncontrolled: off Insulin drip, anion gap has close, increase dose of Lantus,  HbA1C 8.8. C/w IVF. C/w SSI  Possible sepsis due to PNA (? Aspiration) c/w IVF and ABX, BC shows Staph coagulase negative, most likely contaminant, repeat BC so far negative  Aspiration PNA: c/w Zosyn, D/C Vancomycin, c/w  bronchodilators, check sputum. DEBORAH: monitor, c/w IVF. So far resolving  Hypotension: BP had improved, monitor. Use labetalol prn  Volume depletion: so far resolved, monitor. Acute encephalopathy due to above. CT head negative. Likely metabolic, still confused, monitor,  Ammonia level is OK, Neurology help appreciated, more awake today, sedation been tapered down  Multiple bruises forensic nurse eval on hold until patient's mental status improves and she can consent to exam.    Hypokalemia: replace and monitor. Surrogate Decision Maker:  Unknown  Baseline:  Unknown   Body mass index is 28.05 kg/(m^2). Code status: Full for now  Prophylaxis: lovenox  Recommended Disposition: SNF/LTC and  PT, OT, RN     Subjective:     Chief Complaint / Reason for Physician Visit  \"I feel better\". Discussed with RN events overnight. Review of Systems:  Symptom Y/N Comments  Symptom Y/N Comments   Fever/Chills    Chest Pain     Poor Appetite    Edema     Cough    Abdominal Pain     Sputum    Joint Pain     SOB/HERNÁNDEZ y   Pruritis/Rash     Nausea/vomit    Tolerating PT/OT     Diarrhea    Tolerating Diet y    Constipation    Other       Could NOT obtain due to:      Objective:     VITALS:   Last 24hrs VS reviewed since prior progress note.  Most recent are:  Patient Vitals for the past 24 hrs:   Temp Pulse Resp BP SpO2   17 1000 - 71 23 132/75 -   17 0915 - 73 19 - -   17 0900 - 75 16 131/73 -   17 0800 98.3 °F (36.8 °C) 87 18 154/82 -   17 0400 98.3 °F (36.8 °C) (!) 56 (!) 31 145/61 97 %   11/23/17 0300 - 76 16 152/75 94 %   11/23/17 0200 - 60 (!) 31 140/57 100 %   11/23/17 0100 - 60 25 134/62 98 %   11/23/17 0002 98.5 °F (36.9 °C) (!) 55 24 148/67 100 %   11/22/17 2300 - (!) 57 20 150/71 100 %   11/22/17 2200 - 60 29 140/66 98 %   11/22/17 2100 - 71 25 140/83 100 %   11/22/17 2000 - 61 28 137/61 100 %   11/22/17 1900 - 62 28 134/66 97 %   11/22/17 1800 - 69 23 131/78 99 %   11/22/17 1600 98.4 °F (36.9 °C) 96 15 137/68 (!) 44 %   11/22/17 1500 - 64 24 134/73 (!) 86 %   11/22/17 1400 - 65 30 115/55 97 %   11/22/17 1300 - 99 23 (!) 172/108 98 %       Intake/Output Summary (Last 24 hours) at 11/23/17 1228  Last data filed at 11/23/17 0908   Gross per 24 hour   Intake             3466 ml   Output             2200 ml   Net             1266 ml        PHYSICAL EXAM:  General: WD, WN. Alert, confused, no acute distress    EENT:  EOMI. Anicteric sclerae. MMM  Resp:  Coarse BS, rhonchi  CV:  Regular  rhythm,  No edema  GI:  Soft, Non distended, Non tender.  +Bowel sounds  Neurologic:  Alert and oriented X 3, normal speech,   Psych:   Fair   insight. Not anxious nor agitated  Skin:  No rashes. No jaundice    Reviewed most current lab test results and cultures  YES  Reviewed most current radiology test results   YES  Review and summation of old records today    NO  Reviewed patient's current orders and MAR    YES  PMH/SH reviewed - no change compared to H&P  ________________________________________________________________________  Care Plan discussed with:    Comments   Patient y    Family      RN y    Care Manager     Consultant                        Multidiciplinary team rounds were held today with , nursing, pharmacist and clinical coordinator. Patient's plan of care was discussed; medications were reviewed and discharge planning was addressed.      ________________________________________________________________________  Total NON critical care TIME:  30 Minutes    Total CRITICAL CARE TIME Spent:   Minutes non procedure based      Comments   >50% of visit spent in counseling and coordination of care y    ________________________________________________________________________  Naye Haley MD     Procedures: see electronic medical records for all procedures/Xrays and details which were not copied into this note but were reviewed prior to creation of Plan. LABS:  I reviewed today's most current labs and imaging studies.   Pertinent labs include:  Recent Labs      11/23/17   0505   WBC  8.6   HGB  10.1*   HCT  30.9*   PLT  259     Recent Labs      11/23/17   0927  11/23/17   0645  11/22/17   1055  11/22/17   0528   NA  131*  142  143  144   K  3.7  3.6  3.4*  2.8*   CL  95*  105  104  107   CO2  22  25  26  27   GLU  371*  297*  148*  137*   BUN  8  8  6  6   CREA  1.05*  1.10*  1.04*  0.99   CA  6.7*  7.6*  7.7*  7.4*   MG   --   1.8  1.0*  1.0*   PHOS   --   3.2  1.2*  1.2*   ALB   --   2.2*   --    --    TBILI   --   0.5   --    --    SGOT   --   74*   --    --    ALT   --   87*   --    --        Signed: Naye Haley MD

## 2017-11-24 ENCOUNTER — APPOINTMENT (OUTPATIENT)
Dept: MRI IMAGING | Age: 47
DRG: 420 | End: 2017-11-24
Attending: PSYCHIATRY & NEUROLOGY
Payer: MEDICAID

## 2017-11-24 LAB
ALBUMIN SERPL-MCNC: 2.4 G/DL (ref 3.5–5)
ALBUMIN/GLOB SERPL: 0.7 {RATIO} (ref 1.1–2.2)
ALP SERPL-CCNC: 91 U/L (ref 45–117)
ALT SERPL-CCNC: 74 U/L (ref 12–78)
ANION GAP SERPL CALC-SCNC: 8 MMOL/L (ref 5–15)
AST SERPL-CCNC: 40 U/L (ref 15–37)
BASOPHILS # BLD: 0 K/UL
BASOPHILS NFR BLD: 0 %
BILIRUB SERPL-MCNC: 0.5 MG/DL (ref 0.2–1)
BUN SERPL-MCNC: 9 MG/DL (ref 6–20)
BUN/CREAT SERPL: 8 (ref 12–20)
CALCIUM SERPL-MCNC: 7.3 MG/DL (ref 8.5–10.1)
CHLORIDE SERPL-SCNC: 93 MMOL/L (ref 97–108)
CO2 SERPL-SCNC: 28 MMOL/L (ref 21–32)
CREAT SERPL-MCNC: 1.1 MG/DL (ref 0.55–1.02)
DIFFERENTIAL METHOD BLD: ABNORMAL
EOSINOPHIL # BLD: 0 K/UL
EOSINOPHIL NFR BLD: 0 %
ERYTHROCYTE [DISTWIDTH] IN BLOOD BY AUTOMATED COUNT: 20 % (ref 11.5–14.5)
GLOBULIN SER CALC-MCNC: 3.6 G/DL (ref 2–4)
GLUCOSE BLD STRIP.AUTO-MCNC: 175 MG/DL (ref 65–100)
GLUCOSE BLD STRIP.AUTO-MCNC: 236 MG/DL (ref 65–100)
GLUCOSE BLD STRIP.AUTO-MCNC: 245 MG/DL (ref 65–100)
GLUCOSE BLD STRIP.AUTO-MCNC: 351 MG/DL (ref 65–100)
GLUCOSE SERPL-MCNC: 239 MG/DL (ref 65–100)
HCT VFR BLD AUTO: 32.7 % (ref 35–47)
HGB BLD-MCNC: 11.1 G/DL (ref 11.5–16)
LYMPHOCYTES # BLD: 4 K/UL
LYMPHOCYTES NFR BLD: 33 %
MAGNESIUM SERPL-MCNC: 1.4 MG/DL (ref 1.6–2.4)
MCH RBC QN AUTO: 32.8 PG (ref 26–34)
MCHC RBC AUTO-ENTMCNC: 33.9 G/DL (ref 30–36.5)
MCV RBC AUTO: 96.7 FL (ref 80–99)
METAMYELOCYTES NFR BLD MANUAL: 3 %
MONOCYTES # BLD: 1 K/UL
MONOCYTES NFR BLD: 8 %
MYELOCYTES NFR BLD MANUAL: 3 %
NEUTS BAND NFR BLD MANUAL: 5 %
NEUTS SEG # BLD: 6.4 K/UL
NEUTS SEG NFR BLD: 48 %
PLATELET # BLD AUTO: 264 K/UL (ref 150–400)
POTASSIUM SERPL-SCNC: 3.4 MMOL/L (ref 3.5–5.1)
PROT SERPL-MCNC: 6 G/DL (ref 6.4–8.2)
RBC # BLD AUTO: 3.38 M/UL (ref 3.8–5.2)
RBC MORPH BLD: ABNORMAL
SERVICE CMNT-IMP: ABNORMAL
SODIUM SERPL-SCNC: 129 MMOL/L (ref 136–145)
WBC # BLD AUTO: 12 K/UL (ref 3.6–11)
WBC MORPH BLD: ABNORMAL

## 2017-11-24 PROCEDURE — 70551 MRI BRAIN STEM W/O DYE: CPT

## 2017-11-24 PROCEDURE — 85025 COMPLETE CBC W/AUTO DIFF WBC: CPT | Performed by: INTERNAL MEDICINE

## 2017-11-24 PROCEDURE — 83735 ASSAY OF MAGNESIUM: CPT | Performed by: INTERNAL MEDICINE

## 2017-11-24 PROCEDURE — 74011250636 HC RX REV CODE- 250/636: Performed by: INTERNAL MEDICINE

## 2017-11-24 PROCEDURE — 97116 GAIT TRAINING THERAPY: CPT

## 2017-11-24 PROCEDURE — 93306 TTE W/DOPPLER COMPLETE: CPT

## 2017-11-24 PROCEDURE — 97161 PT EVAL LOW COMPLEX 20 MIN: CPT

## 2017-11-24 PROCEDURE — 36415 COLL VENOUS BLD VENIPUNCTURE: CPT | Performed by: INTERNAL MEDICINE

## 2017-11-24 PROCEDURE — 80053 COMPREHEN METABOLIC PANEL: CPT | Performed by: INTERNAL MEDICINE

## 2017-11-24 PROCEDURE — 74011636637 HC RX REV CODE- 636/637: Performed by: INTERNAL MEDICINE

## 2017-11-24 PROCEDURE — 74011250637 HC RX REV CODE- 250/637: Performed by: INTERNAL MEDICINE

## 2017-11-24 PROCEDURE — 82962 GLUCOSE BLOOD TEST: CPT

## 2017-11-24 PROCEDURE — 74011000258 HC RX REV CODE- 258: Performed by: INTERNAL MEDICINE

## 2017-11-24 PROCEDURE — 65270000015 HC RM PRIVATE ONCOLOGY

## 2017-11-24 RX ORDER — POTASSIUM CHLORIDE 20 MEQ/1
40 TABLET, EXTENDED RELEASE ORAL 2 TIMES DAILY
Status: COMPLETED | OUTPATIENT
Start: 2017-11-24 | End: 2017-11-24

## 2017-11-24 RX ORDER — LANOLIN ALCOHOL/MO/W.PET/CERES
400 CREAM (GRAM) TOPICAL 2 TIMES DAILY
Status: DISCONTINUED | OUTPATIENT
Start: 2017-11-24 | End: 2017-11-26 | Stop reason: HOSPADM

## 2017-11-24 RX ORDER — ACETAMINOPHEN 325 MG/1
650 TABLET ORAL
Status: DISCONTINUED | OUTPATIENT
Start: 2017-11-24 | End: 2017-11-26 | Stop reason: HOSPADM

## 2017-11-24 RX ORDER — INSULIN GLARGINE 100 [IU]/ML
5 INJECTION, SOLUTION SUBCUTANEOUS ONCE
Status: COMPLETED | OUTPATIENT
Start: 2017-11-24 | End: 2017-11-24

## 2017-11-24 RX ORDER — LORAZEPAM 2 MG/ML
1 INJECTION INTRAMUSCULAR
Status: COMPLETED | OUTPATIENT
Start: 2017-11-24 | End: 2017-11-24

## 2017-11-24 RX ORDER — POTASSIUM CHLORIDE 7.45 MG/ML
10 INJECTION INTRAVENOUS ONCE
Status: COMPLETED | OUTPATIENT
Start: 2017-11-24 | End: 2017-11-24

## 2017-11-24 RX ORDER — MAGNESIUM SULFATE HEPTAHYDRATE 40 MG/ML
2 INJECTION, SOLUTION INTRAVENOUS ONCE
Status: COMPLETED | OUTPATIENT
Start: 2017-11-24 | End: 2017-11-24

## 2017-11-24 RX ORDER — INSULIN GLARGINE 100 [IU]/ML
30 INJECTION, SOLUTION SUBCUTANEOUS DAILY
Status: DISCONTINUED | OUTPATIENT
Start: 2017-11-25 | End: 2017-11-25

## 2017-11-24 RX ADMIN — Medication 400 MG: at 09:13

## 2017-11-24 RX ADMIN — INSULIN GLARGINE 25 UNITS: 100 INJECTION, SOLUTION SUBCUTANEOUS at 08:05

## 2017-11-24 RX ADMIN — Medication 400 MG: at 17:32

## 2017-11-24 RX ADMIN — PIPERACILLIN SODIUM,TAZOBACTAM SODIUM 3.38 G: 3; .375 INJECTION, POWDER, FOR SOLUTION INTRAVENOUS at 16:09

## 2017-11-24 RX ADMIN — INSULIN LISPRO 2 UNITS: 100 INJECTION, SOLUTION INTRAVENOUS; SUBCUTANEOUS at 21:10

## 2017-11-24 RX ADMIN — PANTOPRAZOLE SODIUM 40 MG: 40 TABLET, DELAYED RELEASE ORAL at 07:45

## 2017-11-24 RX ADMIN — INSULIN GLARGINE 5 UNITS: 100 INJECTION, SOLUTION SUBCUTANEOUS at 09:57

## 2017-11-24 RX ADMIN — GABAPENTIN 300 MG: 300 CAPSULE ORAL at 16:08

## 2017-11-24 RX ADMIN — VENLAFAXINE HYDROCHLORIDE 75 MG: 37.5 CAPSULE, EXTENDED RELEASE ORAL at 09:12

## 2017-11-24 RX ADMIN — MUPIROCIN: 20 OINTMENT TOPICAL at 09:22

## 2017-11-24 RX ADMIN — HYDROCODONE BITARTRATE AND ACETAMINOPHEN 1 TABLET: 5; 325 TABLET ORAL at 22:19

## 2017-11-24 RX ADMIN — ALPRAZOLAM 0.5 MG: 0.5 TABLET ORAL at 09:12

## 2017-11-24 RX ADMIN — LORAZEPAM 1 MG: 2 INJECTION INTRAMUSCULAR at 14:04

## 2017-11-24 RX ADMIN — FENTANYL CITRATE 25 MCG: 50 INJECTION, SOLUTION INTRAMUSCULAR; INTRAVENOUS at 07:40

## 2017-11-24 RX ADMIN — FENTANYL CITRATE 25 MCG: 50 INJECTION, SOLUTION INTRAMUSCULAR; INTRAVENOUS at 20:21

## 2017-11-24 RX ADMIN — ENOXAPARIN SODIUM 40 MG: 40 INJECTION SUBCUTANEOUS at 12:14

## 2017-11-24 RX ADMIN — PIPERACILLIN SODIUM,TAZOBACTAM SODIUM 3.38 G: 3; .375 INJECTION, POWDER, FOR SOLUTION INTRAVENOUS at 21:10

## 2017-11-24 RX ADMIN — POTASSIUM CHLORIDE 40 MEQ: 20 TABLET, EXTENDED RELEASE ORAL at 17:31

## 2017-11-24 RX ADMIN — FENTANYL CITRATE 25 MCG: 50 INJECTION, SOLUTION INTRAMUSCULAR; INTRAVENOUS at 03:20

## 2017-11-24 RX ADMIN — FENTANYL CITRATE 25 MCG: 50 INJECTION, SOLUTION INTRAMUSCULAR; INTRAVENOUS at 16:43

## 2017-11-24 RX ADMIN — ALPRAZOLAM 0.5 MG: 0.5 TABLET ORAL at 16:08

## 2017-11-24 RX ADMIN — HYDROCODONE BITARTRATE AND ACETAMINOPHEN 1 TABLET: 5; 325 TABLET ORAL at 09:57

## 2017-11-24 RX ADMIN — ALPRAZOLAM 0.5 MG: 0.5 TABLET ORAL at 21:10

## 2017-11-24 RX ADMIN — FENTANYL CITRATE 25 MCG: 50 INJECTION, SOLUTION INTRAMUSCULAR; INTRAVENOUS at 12:12

## 2017-11-24 RX ADMIN — MAGNESIUM SULFATE HEPTAHYDRATE 2 G: 40 INJECTION, SOLUTION INTRAVENOUS at 07:43

## 2017-11-24 RX ADMIN — INSULIN LISPRO 2 UNITS: 100 INJECTION, SOLUTION INTRAVENOUS; SUBCUTANEOUS at 17:31

## 2017-11-24 RX ADMIN — GABAPENTIN 300 MG: 300 CAPSULE ORAL at 09:13

## 2017-11-24 RX ADMIN — INSULIN LISPRO 9 UNITS: 100 INJECTION, SOLUTION INTRAVENOUS; SUBCUTANEOUS at 12:14

## 2017-11-24 RX ADMIN — GABAPENTIN 300 MG: 300 CAPSULE ORAL at 21:10

## 2017-11-24 RX ADMIN — PIPERACILLIN SODIUM,TAZOBACTAM SODIUM 3.38 G: 3; .375 INJECTION, POWDER, FOR SOLUTION INTRAVENOUS at 09:15

## 2017-11-24 RX ADMIN — HYDROCODONE BITARTRATE AND ACETAMINOPHEN 1 TABLET: 5; 325 TABLET ORAL at 16:08

## 2017-11-24 RX ADMIN — INSULIN LISPRO 3 UNITS: 100 INJECTION, SOLUTION INTRAVENOUS; SUBCUTANEOUS at 08:05

## 2017-11-24 RX ADMIN — POTASSIUM CHLORIDE 10 MEQ: 10 INJECTION, SOLUTION INTRAVENOUS at 09:22

## 2017-11-24 RX ADMIN — POTASSIUM CHLORIDE 40 MEQ: 20 TABLET, EXTENDED RELEASE ORAL at 09:12

## 2017-11-24 NOTE — PROGRESS NOTES
Oncology Nursing Communication Tool  6:53 PM  11/24/2017     Bedside shift change report given to ALVARO Penn (incoming nurse) by Elisabet Weir RN (outgoing nurse) on Royal Felty. Report included the following information SBAR and Kardex. Significant changes during shift: dinner blood sugar 175      Issues for physician to address: none         Oncology Shift Note   Admission Date 11/20/2017   Admission Diagnosis DKA (diabetic ketoacidoses) ([facility] [address]   Code Status Full Code   Consults IP CONSULT TO NEUROLOGY      Cardiac Monitoring [] Yes [] No      Purposeful Hourly Rounding [] Yes    Deborah Score Total Score: 4   Deborah score 3 or > [] Bed Alarm [] Avasys [] 1:1 sitter [] Patient refused (Place signed refusal form in chart)      Pain Managed [] Yes [] No    Key Pain Meds     The patient is on no pain meds. Influenza Vaccine Received Flu Vaccine for Current Season (usually Sept-March): Yes           Oxygen needs? [] Room air Oxygen @  []1L    []2L    []3L   []4L    []5L   []6L     Use home O2? [] Yes [] No  Perform O2 challenge test using  smartphrase (.Homeoxygen)      Last bowel movement Last Bowel Movement Date: 11/23/17      Urinary Catheter [REMOVED] Urinary Catheter 11/20/17 2- way-Criteria for Appropriate Use: Strict I/Os     [REMOVED] Urinary Catheter 11/20/17 2- way-Urine Output (mL): 300 ml     LDAs               Peripheral IV 11/22/17 Left Forearm (Active)   Site Assessment Clean, dry, & intact 11/24/2017 10:55 AM   Phlebitis Assessment 0 11/24/2017 10:55 AM   Infiltration Assessment 0 11/24/2017 10:55 AM   Dressing Status Clean, dry, & intact 11/24/2017 10:55 AM   Dressing Type Transparent 11/24/2017 10:55 AM   Hub Color/Line Status Pink; Infusing 11/24/2017 10:55 AM   Action Taken Open ports on tubing capped 11/24/2017  8:00 AM   Alcohol Cap Used No 11/24/2017  8:00 AM                         Readmission Risk Assessment Tool Score Medium Risk            14       Total Score        3 Has Seen PCP in Last 6 Months (Yes=3, No=0)    2 . Living with Significant Other. Assisted Living. LTAC. SNF. or   Rehab    4 Pt. Coverage (Medicare=5 , Medicaid, or Self-Pay=4)    5 Charlson Comorbidity Score (Age + Comorbid Conditions)        Criteria that do not apply:    Patient Length of Stay (>5 days = 3)    IP Visits Last 12 Months (1-3=4, 4=9, >4=11)       Expected Length of Stay 4d 21h   Actual Length of Stay 4          Opportunity for questions and clarifications were given to the incoming nurse. Patient's bed is in low position, side rails x2, door open PRN, call bell within reach and patient not in distress.       Diya Morelos RN

## 2017-11-24 NOTE — PROGRESS NOTES
TRANSFER - OUT REPORT:    Verbal report given to [facility] RN(name) on Juliet White  being transferred to [facility](unit) for routine progression of care       Report consisted of patients Situation, Background, Assessment and   Recommendations(SBAR). Information from the following report(s) SBAR, Kardex, Intake/Output and MAR was reviewed with the receiving nurse. Lines:   Peripheral IV 11/22/17 Left Forearm (Active)   Site Assessment Clean, dry, & intact 11/24/2017  8:00 AM   Phlebitis Assessment 0 11/24/2017  8:00 AM   Infiltration Assessment 0 11/24/2017  8:00 AM   Dressing Status Clean, dry, & intact 11/24/2017  8:00 AM   Dressing Type Tape;Transparent 11/24/2017  8:00 AM   Hub Color/Line Status Pink;Capped 11/24/2017  8:00 AM   Action Taken Open ports on tubing capped 11/24/2017  8:00 AM   Alcohol Cap Used No 11/24/2017  8:00 AM        Opportunity for questions and clarification was provided.       Patient transported with:   Patient-specific medications from Pharmacy  Registered Nurse

## 2017-11-24 NOTE — PROGRESS NOTES
Problem: Falls - Risk of  Goal: *Absence of Falls  Document Deborah Fall Risk and appropriate interventions in the flowsheet.    Outcome: Progressing Towards Goal  Fall Risk Interventions:  Mobility Interventions: Bed/chair exit alarm    Mentation Interventions: Bed/chair exit alarm    Medication Interventions: Patient to call before getting OOB    Elimination Interventions: Bed/chair exit alarm    History of Falls Interventions: Bed/chair exit alarm

## 2017-11-24 NOTE — ROUTINE PROCESS

## 2017-11-24 NOTE — PROGRESS NOTES
physical Therapy EVALUATION/DISCHARGE  Patient: Pj Campos (71 y.o. female)  Date: 11/24/2017  Primary Diagnosis: DKA (diabetic ketoacidoses) ([facility]        Precautions: none    ASSESSMENT :  Based on the objective data described below, the patient presents with good strength and endurance, and fair balance. Pt received safely standing in room. Ambulated in the madsen with CGA and no AD. Pt exhibited some mild LOB during gait, but able to self-correct. She reported recently coming out of MRI and receiving Ativan, so the medication may have affected her balance. Ambulated to the stairs, and she ascended/descended 12 stairs with CGA with rail on R. Exhibited no LOB, no SOB. Noted that pt had decreased stance time on R foot, and she reported that was due to previous R ankle fracture. Ambulated back to room with SBA for grand total 300 feet, and had pt  object from floor with supervision. Able to complete task successfully without LOB. Pt reported that her mobility is consistent with that of her baseline level. Due to this, and her ability to demonstrate mobility with independence, pt is not indicated for therapy at this time. Will complete order. Skilled physical therapy is not indicated at this time. PLAN :  Discharge Recommendations: Outpatient (if balance still needs improvement)  Further Equipment Recommendations for Discharge: none     SUBJECTIVE:   Patient stated I feel tired from the meds.     OBJECTIVE DATA SUMMARY:   HISTORY:    Past Medical History:   Diagnosis Date    Achilles tendon rupture     along with torn right patellar/femoral ligament    Arthritis     rt. foot    Chronic pain     abdominal pain    Diabetes (HCC)     IDDM on insulin pump and sensor    DM type 1 (diabetes mellitus, type 1) ([facility]     age 25    Endometriosis     s/p ex-lap 4x    Gastric ulcer     GERD (gastroesophageal reflux disease)     Herpes     Other and unspecified hyperlipidemia     Panic attacks     Psychiatric disorder     anxiety, panic attacks    PTSD (post-traumatic stress disorder)     Unspecified essential hypertension      Past Surgical History:   Procedure Laterality Date    HX GYN      2 d&c's    HX GYN      laparoscopies x3    HX ORTHOPAEDIC  2002    achiles tendon repair,talus repair    HX ORTHOPAEDIC      injections x2 to right shoulder     Prior Level of Function/Home Situation: independent without AD living with boyfriend in single story house with 3 steps to enter and B rails. Pt still drives, but does not work at this time. Personal factors and/or comorbidities impacting plan of care:     Home Situation  Home Environment: Private residence  # Steps to Enter: 3 (6 in the back)  Rails to Enter: Yes  Hand Rails : Bilateral  One/Two Story Residence: One story  Living Alone: No  Support Systems: Spouse/Significant Other/Partner  Patient Expects to be Discharged to[de-identified] Private residence  Current DME Used/Available at Home: None  Tub or Shower Type: Tub/Shower combination    EXAMINATION/PRESENTATION/DECISION MAKING:   Critical Behavior:  Neurologic State: Alert, Eyes open spontaneously  Orientation Level: Oriented X4  Cognition: Follows commands, Impulsive     Hearing: Auditory  Auditory Impairment: None  Skin:  WDL  Edema: WDL  Range Of Motion:  AROM: Within functional limits                       Strength:    Strength: Within functional limits                    Tone & Sensation:                                  Coordination:     Vision:      Functional Mobility:  Bed Mobility:              Transfers:                             Balance:   Sitting: Intact  Standing: Impaired  Standing - Static: Good; Unsupported  Standing - Dynamic : Fair ( )  Ambulation/Gait Training:  Distance (ft): 300 Feet (ft)  Assistive Device:  (none)  Ambulation - Level of Assistance: Contact guard assistance;Stand-by asssistance (some mild LOB, but pt self-corrected)        Gait Abnormalities: Trunk sway increased Speed/Sheba: Slow                            Stairs:  Number of Stairs Trained: 12  Stairs - Level of Assistance: Contact guard assistance   Rail Use: Right      Therapeutic Exercises:       Functional Measure:  Barthel Index:    Bathin  Bladder: 10  Bowels: 10  Groomin  Dressing: 10  Feeding: 10  Mobility: 15  Stairs: 10  Toilet Use: 10  Transfer (Bed to Chair and Back): 15  Total: 100       Barthel and G-code impairment scale:  Percentage of impairment CH  0% CI  1-19% CJ  20-39% CK  40-59% CL  60-79% CM  80-99% CN  100%   Barthel Score 0-100 100 99-80 79-60 59-40 20-39 1-19   0   Barthel Score 0-20 20 17-19 13-16 9-12 5-8 1-4 0      The Barthel ADL Index: Guidelines  1. The index should be used as a record of what a patient does, not as a record of what a patient could do. 2. The main aim is to establish degree of independence from any help, physical or verbal, however minor and for whatever reason. 3. The need for supervision renders the patient not independent. 4. A patient's performance should be established using the best available evidence. Asking the patient, friends/relatives and nurses are the usual sources, but direct observation and common sense are also important. However direct testing is not needed. 5. Usually the patient's performance over the preceding 24-48 hours is important, but occasionally longer periods will be relevant. 6. Middle categories imply that the patient supplies over 50 per cent of the effort. 7. Use of aids to be independent is allowed. Trista Tomlin., Barthel, D.W. (1221). Functional evaluation: the Barthel Index. [facility] (14)2. FRANTZ Cha, Cl Phelps.Mitchell., Pramod, [address] [place] [address] (). Measuring the change indisability after inpatient rehabilitation; comparison of the responsiveness of the Barthel Index and Functional [place] Measure. Journal of Neurology, Neurosurgery, and Psychiatry, 66(4), 513-679.   LOUISA Pritchard, Gurdeep Acevedo M.A. (2004.) Assessment of post-stroke quality of life in cost-effectiveness studies: The usefulness of the Barthel Index and the EuroQoL-5D. Quality of Life Research, 13, 241-84       G codes: In compliance with CMSs Claims Based Outcome Reporting, the following G-code set was chosen for this patient based on their primary functional limitation being treated: The outcome measure chosen to determine the severity of the functional limitation was the Barthel with a score of 100/100 which was correlated with the impairment scale. ? Mobility - Walking and Moving Around:     - CURRENT STATUS: CH - 0% impaired, limited or restricted    - GOAL STATUS: CH - 0% impaired, limited or restricted    - D/C STATUS:  CH - 0% impaired, limited or restricted          Physical Therapy Evaluation Charge Determination   History Examination Presentation Decision-Making   MEDIUM  Complexity : 1-2 comorbidities / personal factors will impact the outcome/ POC  LOW Complexity : 1-2 Standardized tests and measures addressing body structure, function, activity limitation and / or participation in recreation  LOW Complexity : Stable, uncomplicated  LOW Complexity : FOTO score of       Based on the above components, the patient evaluation is determined to be of the following complexity level: LOW     Pain:Pain Scale 1: Numeric (0 - 10)  Pain Intensity 1: 4  Pain Location 1: Back  Activity Tolerance:   Pt tolerated session well, no adverse effects    Please refer to the flowsheet for vital signs taken during this treatment.   After treatment:   [x]   Patient left in no apparent distress sitting up in chair  []   Patient left in no apparent distress in bed  [x]   Call bell left within reach  [x]   Nursing notified  []   Caregiver present  []   Bed alarm activated    COMMUNICATION/EDUCATION:   Communication/Collaboration:  [x]   Fall prevention education was provided and the patient/caregiver indicated understanding. [x]   Patient/family have participated as able and agree with findings and recommendations. []   Patient is unable to participate in plan of care at this time. Findings and recommendations were discussed with: Occupational Therapist and Registered Nurse    Thank you for this referral.  Delphine Hooper, [facility], [place]   Time Calculation: 23 mins      Regarding student involvement in patient care:  A student participated in this treatment session. Per CMS Medicare statements and APTA guidelines I certify that the following was true:  1. I was present and directly observed the entire session. 2. I made all skilled judgments and clinical decisions regarding care. 3. I am the practitioner responsible for assessment, treatment, and documentation.

## 2017-11-24 NOTE — PROGRESS NOTES
[facility] Consult Service Progress NOTE  Pulmonary, Critical Care, and Sleep Medicine    Name: Cesia Cardoza MRN: [id number]   : 1970 Hospital: Counts include 234 beds at the [facility]   Date: 2017  Admission Date: 2017     Chart and notes reviewed. Data reviewed. Pt seen on rounds earlier today. I have evaluated and examined the patient. Pt is acutely ill. Reviewed the evaluation and will assist in implementing the plan as outlined by Dr. Yeimy Brand and team    Hospital Day: 5    I was asked by Phoenix Barnett MD to see Cesia Cardoza in consultation for a chief complaint of Acute encephalopathy    Nat Robert is a 56 y.o.  female with Type 1 DM who presents with DKA. Patient was dropped off the ER by someone who said that they will just \"park their vehicle\" but they never came back. Patient found with multiple bruises on exam and labs consistent with DKA, DEBORAH and hyponatremia. Patient is not able to provide a history. Stat CT head negative. Pt was very agitated in the ER. Given dose of IV ativan and now somnolent in the CCU, unable to give meaningful ROS.  still hypersonolent but no distress. Patient is drowsy but responds to voice. Does not always follow commands. Later in AM agitated, restless and not easily redirected. Resists staff physically. IVprecedex started     off insulin drip. BS still labile. No chest pain. No HA. Claims she fell at home. Has painful legs. Saw Dr. Eva Bernard recently? On low dose IV precedex and now conversant. Very weak     BS stll labile. No anion gap. Low lytes. Asking for fentanyl prn. IMPRESSION:   1. Acute metabolic encephalopathy. CT head negative. Likely metabolic- hyperosmolar state  2. Multiple bruises -forensic nurse eval on hold until patient's mental status improves and she can consent to exam.  unusual pattern of bruising  3. Urine drug screen noted- had benzos; chronic pain  4.  Severe DKA in Type 1, uncontrolled- still labile off insulin drip. Has peripheral neuropathy- painful; Only on lantus 14 units at home and now will need > 25 units. Pt checks her sugars at least 10 x a day per pt  5. Possible sepsis due to PNA (? Aspiration)  6. DEBORAH  7. Hypokalemia  8. hypophosphatemia  9. hypomagnesemia  10. Hypotension- better  11. Volume depletion  12. Tobacco use  13. Anxiety and depression  14. Established problem of diabetes is worsening with threat/risk of bodily function  15. Pt is requiring Drug therapy requiring intensive monitoring for toxicity  16. Pt is critically ill and at high risk of end organ dysfunction and failure      RECOMMENDATIONS/PLAN:   1. stabel for transfer to floor  2. Asked pt to bring her glucometer to endocriniologist visit and make sure her strips and machine are working  3. Finish course of for possible aspiration PNA. 4. Follow up on cultures  5. PT, OT  6. Pt will need HH for home safety assessment- falls, etc  7. Pt education  8. Pt needs IV fluids with additives and Drug therapy requiring intensive monitoring for toxicity  9. Prescription drug management with home med reconciliation done  10. DVT, SUP prophylaxis. 11. Will be available to assist in medical management while in the CCU pending disposition     My assessment/management was discussed with:  Nursing XX    respiratory therapy Dr.   family      Pt's condition is unstable and unpredictable. This care involved high complexity decision making which includes independently reviewing the patient's past medical records, current laboratory results, medication profiles that were immediately available to me and actual Xray images at the bedside in order to assess, support vital system function, and to treat this degree of vital organ system failure, and to prevent further life threatening deterioration of the patients condition. I was in direct communication with the nursing staff throughout this time.     I have personally and independently reviewed the patients interval lab, diagnostic data, radiographs and the reports. I have ordered additional labs to follow the current medical conditions and to monitor treatment responses over the next 24 hours or sooner if needed. I will order additional imaging to follow longitudinal changes found on the most current imaging. Risk of deterioration: high   [x] High complexity decision making was performed  [x] See my orders for details  Tubes:     ICU disposition :Unchanged. Code: Full Code        Hemodynamics:    CO:    CI:    CVP:    SVR:   PAP Systolic:    PAP Diastolic:    PVR:    OJ61:        Ventilator Settings:      Mode Rate TV Press PEEP FiO2 PIP Min. Vent                            Vital Signs: Intake/Output: Intake/Output:   Temp (24hrs), Av.3 °F (36.8 °C), Min:98 °F (36.7 °C), Max:98.5 °F (36.9 °C)     Visit Vitals    /69 (BP 1 Location: Right arm, BP Patient Position: At rest)    Pulse 87    Temp 98.4 °F (36.9 °C)    Resp 18    Wt 76.6 kg (168 lb 14 oz)    SpO2 97%    Breastfeeding No    BMI 28.54 kg/m2        Telemetry:    normal sinus rhythm   O2 Device: Room air  O2 Flow Rate (L/min): 2 l/min  Wt Readings from Last 4 Encounters:   17 76.6 kg (168 lb 14 oz)   17 76.6 kg (168 lb 14 oz)   17 78.9 kg (174 lb)   17 82.1 kg (181 lb 1.6 oz)          Intake/Output Summary (Last 24 hours) at 17 0901  Last data filed at 17 0800   Gross per 24 hour   Intake          8372.35 ml   Output             2900 ml   Net          5472.35 ml     Last shift:      701 - 1900  In: 50 [I.V.:50]  Out: -   Last 3 shifts: 1901 -  07  In: 63380.7 [P.O.:2910; I.V.:7234.7]  Out: 3450 [Urine:3450]     Physical Exam:    General: severely ill WF   HEAD: Normocephalic, without obvious abnormality, atraumatic; ear piercings   EYES: conjuctivae clear.   AN Icteric sclerae   NOSE: Nares normal;    THROAT:  mucous membranes dry , poor Oral hygiene; No Thrush; class 3 airway; Tongue midline   Neck: Supple, symmetrical, trachea midline, No accessory mm use, No Stridor/ cuff leak   LYMPH: No abnormally enlarged lymph nodes. Chest: normal   Lungs: normal air entry   Heart: Regular rate and rhythm   Abdomen: soft, non-tender, without masses or organomegaly   :      Rankin   Extremity: negative, clubbing    Neuro: lethargic but easily agitated and resistant to staff, disoriented   Psych: unable to assess   Skin: Turgor absent;    Pulses:Bilateral, Radial, 2+   Capillary refill: abnormal:  sluggish capillary refill,    Tubes:   Rankin    DATA:    Interval lab and diagnostic data was reviewed. Interval radiology images were independently viewed and available reports were reviewed. All lab results for the last 24 hours reviewed.     MAR reviewed and pertinent medications noted or modified as needed  MEDS:   Current Facility-Administered Medications   Medication    potassium chloride 10 mEq in 100 ml IVPB    insulin glargine (LANTUS) injection 5 Units    potassium chloride SR (KLOR-CON 10) tablet 40 mEq    magnesium oxide (MAG-OX) tablet 400 mg    [START ON 11/25/2017] insulin glargine (LANTUS) injection 30 Units    acetaminophen (TYLENOL) tablet 650 mg    HYDROcodone-acetaminophen (NORCO) 5-325 mg per tablet 1 Tab    insulin lispro (HUMALOG) injection    fentaNYL citrate (PF) injection 25 mcg    gabapentin (NEURONTIN) capsule 300 mg    ALPRAZolam (XANAX) tablet 0.5 mg    pantoprazole (PROTONIX) tablet 40 mg    venlafaxine-SR (EFFEXOR-XR) capsule 75 mg    albuterol-ipratropium (DUO-NEB) 2.5 MG-0.5 MG/3 ML    glucose chewable tablet 16 g    dextrose (D50W) injection syrg 12.5-25 g    glucagon (GLUCAGEN) injection 1 mg    enoxaparin (LOVENOX) injection 40 mg    labetalol (NORMODYNE;TRANDATE) injection 10 mg    sodium chloride (NS) flush 5-10 mL    ondansetron (ZOFRAN) injection 4 mg    piperacillin-tazobactam (ZOSYN) 3.375 g in 0.9% sodium chloride (MBP/ADV) 100 mL    mupirocin (BACTROBAN) 2 % ointment        Labs:    Recent Labs      11/24/17   0330  11/23/17   0505   WBC  12.0*  8.6   HGB  11.1*  10.1*   PLT  264  259     Recent Labs      11/24/17   0330  11/23/17   2116  11/23/17   1847   11/23/17   0645  11/22/17   1055  11/22/17   0528   NA  129*  124*  124*   < >  142  143  144   K  3.4*  2.9*  3.1*   < >  3.6  3.4*  2.8*   CL  93*  90*  87*   < >  105  104  107   CO2  28  24  25   < >  25  26  27   GLU  239*  346*  396*   < >  297*  148*  137*   BUN  9  10  10   < >  8  6  6   CREA  1.10*  1.42*  1.19*   < >  1.10*  1.04*  0.99   CA  7.3*  7.2*  7.5*   < >  7.6*  7.7*  7.4*   MG  1.4*   --    --    --   1.8  1.0*  1.0*   PHOS   --    --    --    --   3.2  1.2*  1.2*   ALB  2.4*   --    --    --   2.2*   --    --    SGOT  40*   --    --    --   74*   --    --    ALT  74   --    --    --   87*   --    --     < > = values in this interval not displayed. No results for input(s): PH, PCO2, PO2, HCO3, FIO2 in the last 72 hours. No results for input(s): CPK, CKNDX, TROIQ in the last 72 hours. No lab exists for component: CPKMB  No results found for: BNPP, BNP   Lab Results   Component Value Date/Time    Culture result: NO GROWTH 2 DAYS 11/22/2017 09:58 AM    Culture result:  11/20/2017 12:06 PM     STAPHYLOCOCCUS SPECIES, COAGULASE NEGATIVE GROWING IN 1 OF 2 BOTTLES DRAWN ( SITE = L AC) PLATES HELD 3 DAYS. CALL MICROBIOLOGY LAB IF SENSITIVITIES ARE NEEDED.     Culture result: REMAINING BOTTLE(S) HAS/HAVE NO GROWTH SO FAR 11/20/2017 12:06 PM    Culture result: NO GROWTH 4 DAYS 11/20/2017 12:06 PM     No results found for: VANCT, CPK  Lab Results   Component Value Date/Time    Color YELLOW/STRAW 11/20/2017 12:34 PM    Appearance CLOUDY 11/20/2017 12:34 PM    pH (UA) 5.0 11/20/2017 12:34 PM    Protein 30 11/20/2017 12:34 PM    Glucose 250 11/20/2017 12:34 PM    Ketone 40 11/20/2017 12:34 PM    Bilirubin NEGATIVE  05/22/2010 05:15 PM    Blood LARGE 11/20/2017 12:34 PM    Urobilinogen 0.2 11/20/2017 12:34 PM    Nitrites NEGATIVE  11/20/2017 12:34 PM    Leukocyte Esterase NEGATIVE  11/20/2017 12:34 PM    WBC 0-4 11/20/2017 12:34 PM    RBC 0-5 11/20/2017 12:34 PM    Bacteria NEGATIVE  11/20/2017 12:34 PM       No results found for: IRON, FE, TIBC, IBCT, PSAT, FERR  Lab Results   Component Value Date/Time    TSH 4.75 11/23/2017 06:45 AM      Lab Results   Component Value Date/Time    Hepatitis B surface Ag <0.10 10/22/2014 11:30 AM       Imaging:      Results from [place] encounter on 11/20/17   XR CHEST PORT   Narrative EXAM:  XR CHEST PORT    INDICATION:  Sepsis    COMPARISON:  11/7/2012    FINDINGS:    A portable AP radiograph of the chest was obtained at 13:56 hours. The patient  is on a cardiac monitor. There is patchy bibasilar airspace disease and mild  vascular congestion. The pattern is more suggestive of pulmonary edema than of  pneumonia, although bilateral pneumonia cannot be excluded. There is no pleural  fluid. The cardiac silhouette is normal in size. The bones and soft tissues  are unremarkable. Impression IMPRESSION:     Bibasilar airspace disease and vascular congestion suggesting pulmonary edema. Different diagnosis of pneumonia. Results from [place] encounter on 11/20/17   CT HEAD WO CONT   Narrative EXAM:  CT HEAD WITHOUT CONTRAST  INDICATION: Trauma. COMPARISON: None. CONTRAST: None. TECHNIQUE: Unenhanced CT of the head was performed using 5 mm images. Brain and  bone windows were generated. Sagittal and coronal reformations were generated. CT dose reduction was achieved through use of a standardized protocol tailored  for this examination and automatic exposure control for dose modulation. CT dose  reduction was achieved through use of a standardized protocol tailored for this  examination and automatic exposure control for dose modulation.     FINDINGS:  The ventricles and sulci are normal in size, shape and configuration and  midline. There is no significant white matter disease. There is no  intracranial hemorrhage. There is no extra-axial collection, mass, mass effect  or midline shift. The basilar cisterns are open. No acute infarct is  identified. The bone windows demonstrate no abnormalities. The visualized  portions of the paranasal sinuses and mastoid air cells are clear. Impression IMPRESSION: Normal unenhanced CT examination of the head.             Sharri Amado MD

## 2017-11-24 NOTE — PROGRESS NOTES
Bedside and Verbal shift change report given to NIMISHA hBagat RN (oncoming nurse) by Madiha Hall. Gaye Cohen RN (offgoing nurse). Report included the following information SBAR, Kardex, Intake/Output, MAR, Recent Results and Cardiac Rhythm NSR-ST.   2000: Assessment completed. 2054: BMP resulted, Na 124, K 3.1. Patient received 20meq kcl po bid today. Shift report included patient drinking copious amounts of water today. Paged hospitalist to notify. 2104: Notified Dr. Carole Camarillo of above. Orders received to fluid restrict patient to 1200ml/day. Redraw BMP now. 2112: Blood for BMP drawn and sent to lab. 2235: Notified Dr. Florentino Leblanc of redrawn lab results: Na 124, K 2.9. Orders received for fluid restriction of 1200ml FREE WATER. KCL 40meq po now. Also advised hs BG is 386. Received 10 units lispro at 1900 for BG >400. Orders received to give 5units Lispro to cover hs BG.   2350: Patient calls to use restroom. Assisted up to commode to void, then back to bed. No distress noted. VSS. Assessment unchanged. Continues to c/o back pain despite medicating with Fentanyl and Norco as ordered. 0315: Patient assisted up to bathroom to void/have BM. Assisted with chlorhexidine bath, linens changed. Assessment unchanged. C/o back pain, rates 8/10. Medicated with Fentanyl 25mcg IV.   0173: Bedside and Verbal shift change report given to TERI Can RN (oncoming nurse) by NIMISHA Bhagat RN (offgoing nurse).  Report included the following information SBAR, Kardex, Intake/Output, MAR, Recent Results and Cardiac Rhythm NSR-ST.

## 2017-11-24 NOTE — PROGRESS NOTES
Problem: Falls - Risk of  Goal: *Absence of Falls  Document Deborah Fall Risk and appropriate interventions in the flowsheet.    Outcome: Progressing Towards Goal  Fall Risk Interventions:  Mobility Interventions: Assess mobility with egress test, Bed/chair exit alarm, Communicate number of staff needed for ambulation/transfer, OT consult for ADLs, PT Consult for mobility concerns, Patient to call before getting OOB    Mentation Interventions: Increase mobility, Reorient patient, Bed/chair exit alarm, Door open when patient unattended, Evaluate medications/consider consulting pharmacy, Family/sitter at bedside    Medication Interventions: Patient to call before getting OOB, Bed/chair exit alarm, Teach patient to arise slowly    Elimination Interventions: Bed/chair exit alarm, Call light in reach, Patient to call for help with toileting needs, Toileting schedule/hourly rounds    History of Falls Interventions: Bed/chair exit alarm, Door open when patient unattended, Investigate reason for fall, Room close to nurse's station, Consult care management for discharge planning

## 2017-11-24 NOTE — PROGRESS NOTES
Attempted to see pt for PT eval. Noted pt transferred out of ICU to [phone], will continue to follow.

## 2017-11-24 NOTE — PROGRESS NOTES
OT referral received, chart reviewed, and patient screened for OT needs. Spoke with patient who reports being at her independent baseline for ADLs and functional mobility. PT reported that patient was up independently, making her bed up their arrival. PT further reports patient is demonstrating good overall balance. No OT needs indicated at this time. Will complete OT order.

## 2017-11-24 NOTE — PROCEDURES
[address]   EEG       Name:  Jaret Bojorquez   MR#:  [id number]   :  1970   Account #:  [de-identified]    Date of Procedure:  2017   Date of Adm:  2017       EXAM DATE: 2017. EXAM NUMBER: [id number]. INDICATION: Altered mental status    DESCRIPTION OF PROCEDURE: Electrodes were applied in   accordance with the International 10-20 system of electrode   placement. The EEG was reviewed in both bipolar and referential   Montages. DESCRIPTION OF FINDINGS: Background consists of 9-10 Hz   activity. This activity attenuates with eye opening. Photic stimulation   produced a symmetric occipital drive. No seizures or interictal   hallmarks of epilepsy were noted on the study. No sleep was recorded   during this study. IMPRESSION: This is a normal electroencephalogram. No seizures   were noted on this study. Absence of seizures on this study does not exclude the diagnosis of   epilepsy. Clinical correlation is advised.         Burke Martinez MD      [facility] / Cristian Jimenes   D:  2017   11:33   T:  2017   11:56   Job #:  [id number]

## 2017-11-24 NOTE — PROGRESS NOTES
Chief Complaint: altered mental status    Back to baseline. MRI and EEG completed. Assesment and Plan    1. Encephalopathy acute   Non focal neuro exam. May have had a seizure she has no meaningful input to what could have happened. MRI normal   EEG normal   Np further treatment recommendations  Signing off.       2.  Diabetic ketoacidosis without coma associated with type 1 diabetes mellitus   Continue hydration/insulin      3. Acute renal failure, unspecified acute renal failure type ([address]  On fluids      4. PTSD   On Effexor and xanax     5. Tobacco abuse  Advised on the dangers of tobacco abuse  Advised on the benefits of cessation    6.  Diabetic neuropathy  On neurontin      Allergies  Zocor [simvastatin] and Lisinopril     Medications  Current Facility-Administered Medications   Medication Dose Route Frequency    potassium chloride (K-DUR, KLOR-CON) SR tablet 40 mEq  40 mEq Oral BID    magnesium oxide (MAG-OX) tablet 400 mg  400 mg Oral BID    [START ON 11/25/2017] insulin glargine (LANTUS) injection 30 Units  30 Units SubCUTAneous DAILY    acetaminophen (TYLENOL) tablet 650 mg  650 mg Oral Q6H PRN    HYDROcodone-acetaminophen (NORCO) 5-325 mg per tablet 1 Tab  1 Tab Oral Q6H PRN    insulin lispro (HUMALOG) injection   SubCUTAneous AC&HS    fentaNYL citrate (PF) injection 25 mcg  25 mcg IntraVENous Q4H PRN    gabapentin (NEURONTIN) capsule 300 mg  300 mg Oral TID    ALPRAZolam (XANAX) tablet 0.5 mg  0.5 mg Oral TID    pantoprazole (PROTONIX) tablet 40 mg  40 mg Oral ACB    venlafaxine-SR (EFFEXOR-XR) capsule 75 mg  75 mg Oral DAILY WITH BREAKFAST    albuterol-ipratropium (DUO-NEB) 2.5 MG-0.5 MG/3 ML  3 mL Nebulization Q6H PRN    glucose chewable tablet 16 g  4 Tab Oral PRN    dextrose (D50W) injection syrg 12.5-25 g  12.5-25 g IntraVENous PRN    glucagon (GLUCAGEN) injection 1 mg  1 mg IntraMUSCular PRN    enoxaparin (LOVENOX) injection 40 mg  40 mg SubCUTAneous Q24H    labetalol (NORMODYNE;TRANDATE) injection 10 mg  10 mg IntraVENous Q6H PRN    sodium chloride (NS) flush 5-10 mL  5-10 mL IntraVENous PRN    ondansetron (ZOFRAN) injection 4 mg  4 mg IntraVENous Q4H PRN    piperacillin-tazobactam (ZOSYN) 3.375 g in 0.9% sodium chloride (MBP/ADV) 100 mL  3.375 g IntraVENous Q8H    mupirocin (BACTROBAN) 2 % ointment   Both Nostrils BID        Medical History  Past Medical History:   Diagnosis Date    Achilles tendon rupture     along with torn right patellar/femoral ligament    Arthritis     rt. foot    Chronic pain     abdominal pain    Diabetes (HCC)     IDDM on insulin pump and sensor    DM type 1 (diabetes mellitus, type [facility]) [facility]     age 25    Endometriosis     s/p ex-lap 4x    Gastric ulcer     GERD (gastroesophageal reflux disease)     Herpes     Other and unspecified hyperlipidemia     Panic attacks     Psychiatric disorder     anxiety, panic attacks    PTSD (post-traumatic stress disorder)     Unspecified essential hypertension      Review of Systems   Constitutional: Negative for chills and fever. HENT: Negative for ear pain. Eyes: Negative for pain and discharge. Respiratory: Negative for cough and hemoptysis. Cardiovascular: Negative for chest pain and claudication. Gastrointestinal: Negative for constipation and diarrhea. Genitourinary: Negative for flank pain and hematuria. Musculoskeletal: Positive for joint pain and myalgias. Negative for back pain. Skin: Negative for itching and rash. Neurological: Positive for sensory change. Endo/Heme/Allergies: Negative for environmental allergies. Does not bruise/bleed easily. Psychiatric/Behavioral: Negative for depression and hallucinations.          Exam:    Visit Vitals    /68 (BP 1 Location: Right arm, BP Patient Position: At rest)    Pulse 96    Temp 98.8 °F (37.1 °C)    Resp 18    Wt 168 lb 14 oz (76.6 kg)    SpO2 96%    Breastfeeding No    BMI 28.54 kg/m2      General: Well developed, well nourished. Patient in no apparent distress   Head: Normocephalic, atraumatic, anicteric sclera   Neck Normal ROM, No thyromegally   Lungs:  Clear to auscultation bilaterally, No wheezes or rubs   Cardiac: Regular rate and rhythm with no murmurs. Abd: Bowel sounds were audible. No tenderness on palpation   Ext: No pedal edema   Skin: Supple no rash     NeurologicExam:  Mental Status: Alert and oriented to person place and time   Speech: Fluent no aphasia or dysarthria. Cranial Nerves:  II - XII Inact   Motor:  Full and symmetric strength of upper and lower proximal and distal muscles. Normal bulk and tone. Reflexes:   Deep tendon reflexes 0+/4 and symmetric. Sensory:   Symmetric and intact with no perceived deficits modalities involving small or large fibers. Tremor:   No tremor noted. Cerebellar:  Coordination intact. Neurovascular: No carotid bruits. No JVD           Imaging    CT Results (most recent):    Results from Hospital Encounter encounter on 11/20/17   CT HEAD WO CONT   Narrative EXAM:  CT HEAD WITHOUT CONTRAST  INDICATION: Trauma. COMPARISON: None. CONTRAST: None. TECHNIQUE: Unenhanced CT of the head was performed using 5 mm images. Brain and  bone windows were generated. Sagittal and coronal reformations were generated. CT dose reduction was achieved through use of a standardized protocol tailored  for this examination and automatic exposure control for dose modulation. CT dose  reduction was achieved through use of a standardized protocol tailored for this  examination and automatic exposure control for dose modulation. FINDINGS:  The ventricles and sulci are normal in size, shape and configuration and  midline. There is no significant white matter disease. There is no  intracranial hemorrhage. There is no extra-axial collection, mass, mass effect  or midline shift. The basilar cisterns are open. No acute infarct is  identified.  The bone windows demonstrate no abnormalities. The visualized  portions of the paranasal sinuses and mastoid air cells are clear. Impression IMPRESSION: Normal unenhanced CT examination of the head.             Lab Review    Lab Results   Component Value Date/Time    WBC 12.0 11/24/2017 03:30 AM    HCT 32.7 11/24/2017 03:30 AM    HGB 11.1 11/24/2017 03:30 AM    PLATELET 585 96/81/9214 03:30 AM       Lab Results   Component Value Date/Time    Sodium 129 11/24/2017 03:30 AM    Potassium 3.4 11/24/2017 03:30 AM    Chloride 93 11/24/2017 03:30 AM    CO2 28 11/24/2017 03:30 AM    Glucose 239 11/24/2017 03:30 AM    BUN 9 11/24/2017 03:30 AM    Creatinine 1.10 11/24/2017 03:30 AM    Calcium 7.3 11/24/2017 03:30 AM       Lab Results   Component Value Date/Time    Hemoglobin A1c 8.8 11/21/2017 05:18 AM        Lab Results   Component Value Date/Time    Vitamin B12 461 06/25/2015 09:15 AM    Folate 32.0 06/25/2015 09:15 AM       Lab Results   Component Value Date/Time    Cholesterol, total 133 09/28/2017 02:10 PM    HDL Cholesterol 72 09/28/2017 02:10 PM    LDL,Direct 64 10/22/2014 11:30 AM    LDL, calculated 37 09/28/2017 02:10 PM    VLDL, calculated 24 09/28/2017 02:10 PM    Triglyceride 122 09/28/2017 02:10 PM    CHOL/HDL Ratio 2.3 02/21/2017 10:35 AM

## 2017-11-24 NOTE — PROGRESS NOTES
Initial Nutrition Assessment:    INTERVENTIONS/RECOMMENDATIONS:   · Continue consistent carb diet  · Will provide consistent carb diet education before d/c    ASSESSMENT:   Chart reviewed, medically noted for DKA w/ S0BI, Acute metabolic encephalopathy and PMH shown below. Pt reports severe weight loss of 45 lbs (21%) x 6 months. She said it started when she was admitted to [facility] in the ICU on 17 for DKA and was very ill for a month, had a PEG placed (now removed). Recently she has been eating well but still loosing weight, likely due to uncontrolled BG. She notes BG can be in the 400's then drop to the 30's. She would like to consistent carb education asked if I could return later due to a busy morning. DTC is following for insulin management. Past Medical History:   Diagnosis Date    Achilles tendon rupture     along with torn right patellar/femoral ligament    Arthritis     rt. foot    Chronic pain     abdominal pain    Diabetes (HCC)     IDDM on insulin pump and sensor    DM type 1 (diabetes mellitus, type 1) [facility]     age 25    Endometriosis     s/p ex-lap 4x    Gastric ulcer     GERD (gastroesophageal reflux disease)     Herpes     Other and unspecified hyperlipidemia     Panic attacks     Psychiatric disorder     anxiety, panic attacks    PTSD (post-traumatic stress disorder)     Unspecified essential hypertension        Diet Order: Consistent carb  % Eaten:  No data found.     Pertinent Medications: [x]Reviewed: SSI, mag ox, PPI, KCl,   Pertinent Labs: [x]Reviewed: B, K+ 3.4, Mg 1.4  Food Allergies: [x]NKFA  []Other   Last BM:   Edema:        []RUE   []LUE   []RLE   []LLE      Pressure Injury:      [] Stage I   [] Stage II   [] Stage III   [] Stage IV      Wt Readings from Last 30 Encounters:   17 76.6 kg (168 lb 14 oz)   17 76.6 kg (168 lb 14 oz)   17 78.9 kg (174 lb)   17 82.1 kg (181 lb 1.6 oz)   17 85.8 kg (189 lb 1.6 oz)   17 93.8 kg (206 lb 12.8 oz)   10/04/16 94.7 kg (208 lb 12.8 oz)   06/28/16 95.2 kg (209 lb 12.8 oz)   04/12/16 95.3 kg (210 lb 3.2 oz)   02/02/16 96.8 kg (213 lb 8 oz)   12/15/15 103.2 kg (227 lb 9.6 oz)   11/05/15 98.6 kg (217 lb 4.8 oz)   08/27/15 94 kg (207 lb 3.2 oz)   07/30/15 95.9 kg (211 lb 6.4 oz)   06/25/15 92.7 kg (204 lb 6.4 oz)   05/28/15 91 kg (200 lb 9.6 oz)   05/07/15 91 kg (200 lb 9.6 oz)   04/10/15 95.3 kg (210 lb 3.2 oz)   12/23/14 93.8 kg (206 lb 14.4 oz)   10/09/14 98.6 kg (217 lb 4.8 oz)   06/17/14 91.3 kg (201 lb 3.2 oz)   04/09/14 94.4 kg (208 lb 3.2 oz)   01/21/14 106.5 kg (234 lb 14.4 oz)   09/27/13 97.3 kg (214 lb 6.4 oz)   05/08/13 97.1 kg (214 lb)   04/11/13 91.9 kg (202 lb 8 oz)   03/07/13 92.1 kg (203 lb 1.6 oz)   01/10/13 93.1 kg (205 lb 3.2 oz)   12/12/12 93.7 kg (206 lb 9.6 oz)   11/12/12 93.6 kg (206 lb 6.4 oz)       Anthropometrics:   Height:   Weight: 76.6 kg (168 lb 14 oz)   IBW (%IBW):   ( ) UBW (%UBW):   (  %)   Last Weight Metrics:  Weight Loss Metrics 11/23/2017 11/14/2017 9/28/2017 5/30/2017 2/28/2017 10/4/2016 6/28/2016   Today's Wt 168 lb 14 oz 174 lb 181 lb 1.6 oz 189 lb 1.6 oz 206 lb 12.8 oz 208 lb 12.8 oz 209 lb 12.8 oz   BMI 28.54 kg/m2 29.41 kg/m2 30.14 kg/m2 31.47 kg/m2 34.41 kg/m2 34.75 kg/m2 34.91 kg/m2       BMI: Body mass index is 28.54 kg/(m^2). This BMI is indicative of:   []Underweight    []Normal    [x]Overweight    [] Obesity   [] Extreme Obesity (BMI>40)     Estimated Nutrition Needs (Based on):   1800 Kcals/day (BMR: 1390 x 1.3) , 75 g (1 g/kg) Protein  Carbohydrate:  At Least 130 g/day  Fluids: 1800 mL/day (1ml/kcal) or per primary team    NUTRITION DIAGNOSES:   Problem:  Unintended weight loss      Etiology: related to uncontrolled DM     Signs/Symptoms: as evidenced by 11% weight loss in 6 months      NUTRITION INTERVENTIONS:  Meals/Snacks: General/healthful diet         Initial/Brief Nutrition Education: Purpose of nutrition education        GOAL: stabilize BG <180 mg/dL in 2-4 days    LEARNING NEEDS (Diet, Food/Nutrient-Drug Interaction):    [x] None Identified   [] Identified and Education Provided/Documented   [] Identified and Pt declined/was not appropriate     Cultureal, Taoism, OR Ethnic Dietary Needs:    [x] None Identified   [] Identified and Addressed     [x] Interdisciplinary Care Plan Reviewed/Documented    [x] Discharge Planning: Consistent carb diet      MONITORING /EVALUATION:   Behavioral-Environmental Outcomes: Food/nutrition knowledge  Food/Nutrient Intake Outcomes:  Total energy intake, Carbohydrate intake  Physical Signs/Symptoms Outcomes: Weight/weight change, Electrolyte and renal profile, Glucose profile, GI    NUTRITION RISK:    [x] High              [] Moderate           []  Low  []  Minimal/Uncompromised    PT SEEN FOR:    []  MD Consult: []Calorie Count      []Diabetic Diet Education        []Diet Education     []Electrolyte Management     []General Nutrition Management and Supplements     []Management of Tube Feeding     []TPN Recommendations    [x]  RN Referral:  [x]MST score >=2     []Enteral/Parenteral Nutrition PTA     []Pregnant: Gestational DM or Multigestation     []Pressure Ulcer/Wound Care needs        []  Low BMI  []  LOS Referral       Porter Castaneda RDN  Pager [phone]  Weekend Pager [phone]

## 2017-11-24 NOTE — PROGRESS NOTES
0740 patient reporting back pain 7/10 medicated with 25mcg fenanyl IV.     0800  assessment completed patient alert and oriented, moves all extremities. Reports pain is improved but still rates it 6/7 out of 10. States \"it will start to work soon\"     6593 Patient up to chair with standby assist tolerated well. 5693 Patient back to bed for EEG.     0957 Patient medicated with 1 tab norco 5/325 for pain 7/10 in back. Report called to oncology.

## 2017-11-24 NOTE — DIABETES MGMT
DTC Progress Note    Recommendations/ Comments: Consult received for assessment of home management- per MD, patient remains confused- will defer education at this time. Note Lantus being titrated, recommend resuming prandial insulin at this time. Chart reviewed on [facility]  with known Type 1 Diabetes on intensive insulin regimen at home. Note RD to follow for diet education. A1c:   Lab Results   Component Value Date/Time    Hemoglobin A1c 8.8 11/21/2017 05:18 AM           Recent Glucose Results:   Lab Results   Component Value Date/Time     (H) 11/24/2017 03:30 AM     (H) 11/23/2017 09:16 PM     (H) 11/23/2017 06:47 PM    GLUCPOC 351 (H) 11/24/2017 11:49 AM    GLUCPOC 245 (H) 11/24/2017 07:47 AM    GLUCPOC 386 (H) 11/23/2017 10:26 PM        Lab Results   Component Value Date/Time    Creatinine 1.10 11/24/2017 03:30 AM     Estimated Creatinine Clearance: 64.1 mL/min (based on Cr of 1.1). Active Orders   Diet    DIET DIABETIC CONSISTENT CARB Regular        PO intake:   Patient Vitals for the past 72 hrs:   % Diet Eaten   11/24/17 0900 80 %       Will continue to follow as needed. Thank you.         Agapito Chan, [address], [address] [place] [address]  Office:  [phone]
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
## 2017-11-25 ENCOUNTER — APPOINTMENT (OUTPATIENT)
Dept: GENERAL RADIOLOGY | Age: 47
DRG: 420 | End: 2017-11-25
Attending: INTERNAL MEDICINE
Payer: MEDICAID

## 2017-11-25 LAB
ALBUMIN SERPL-MCNC: 2.5 G/DL (ref 3.5–5)
ALBUMIN/GLOB SERPL: 0.7 {RATIO} (ref 1.1–2.2)
ALP SERPL-CCNC: 80 U/L (ref 45–117)
ALT SERPL-CCNC: 60 U/L (ref 12–78)
ANION GAP SERPL CALC-SCNC: 8 MMOL/L (ref 5–15)
AST SERPL-CCNC: 27 U/L (ref 15–37)
BASOPHILS # BLD: 0.1 K/UL
BASOPHILS NFR BLD: 1 %
BILIRUB SERPL-MCNC: 0.4 MG/DL (ref 0.2–1)
BUN SERPL-MCNC: 6 MG/DL (ref 6–20)
BUN/CREAT SERPL: 6 (ref 12–20)
CALCIUM SERPL-MCNC: 8 MG/DL (ref 8.5–10.1)
CHLORIDE SERPL-SCNC: 94 MMOL/L (ref 97–108)
CO2 SERPL-SCNC: 27 MMOL/L (ref 21–32)
CREAT SERPL-MCNC: 1.01 MG/DL (ref 0.55–1.02)
DIFFERENTIAL METHOD BLD: ABNORMAL
EOSINOPHIL # BLD: 0 K/UL
EOSINOPHIL NFR BLD: 0 %
ERYTHROCYTE [DISTWIDTH] IN BLOOD BY AUTOMATED COUNT: 21.1 % (ref 11.5–14.5)
GLOBULIN SER CALC-MCNC: 3.6 G/DL (ref 2–4)
GLUCOSE BLD STRIP.AUTO-MCNC: 106 MG/DL (ref 65–100)
GLUCOSE BLD STRIP.AUTO-MCNC: 227 MG/DL (ref 65–100)
GLUCOSE BLD STRIP.AUTO-MCNC: 312 MG/DL (ref 65–100)
GLUCOSE BLD STRIP.AUTO-MCNC: 342 MG/DL (ref 65–100)
GLUCOSE BLD STRIP.AUTO-MCNC: 344 MG/DL (ref 65–100)
GLUCOSE BLD STRIP.AUTO-MCNC: 347 MG/DL (ref 65–100)
GLUCOSE SERPL-MCNC: 310 MG/DL (ref 65–100)
HCT VFR BLD AUTO: 33.6 % (ref 35–47)
HGB BLD-MCNC: 10.9 G/DL (ref 11.5–16)
LYMPHOCYTES # BLD: 4.1 K/UL
LYMPHOCYTES NFR BLD: 32 %
MAGNESIUM SERPL-MCNC: 1.9 MG/DL (ref 1.6–2.4)
MCH RBC QN AUTO: 32.2 PG (ref 26–34)
MCHC RBC AUTO-ENTMCNC: 32.4 G/DL (ref 30–36.5)
MCV RBC AUTO: 99.1 FL (ref 80–99)
METAMYELOCYTES NFR BLD MANUAL: 1 %
MONOCYTES # BLD: 0.4 K/UL
MONOCYTES NFR BLD: 3 %
MYELOCYTES NFR BLD MANUAL: 1 %
NEUTS BAND NFR BLD MANUAL: 4 %
NEUTS SEG # BLD: 8 K/UL
NEUTS SEG NFR BLD: 58 %
PLATELET # BLD AUTO: 317 K/UL (ref 150–400)
POTASSIUM SERPL-SCNC: 4.2 MMOL/L (ref 3.5–5.1)
PROT SERPL-MCNC: 6.1 G/DL (ref 6.4–8.2)
RBC # BLD AUTO: 3.39 M/UL (ref 3.8–5.2)
RBC MORPH BLD: ABNORMAL
SERVICE CMNT-IMP: ABNORMAL
SODIUM SERPL-SCNC: 129 MMOL/L (ref 136–145)
WBC # BLD AUTO: 12.9 K/UL (ref 3.6–11)
WBC MORPH BLD: ABNORMAL

## 2017-11-25 PROCEDURE — 74011250637 HC RX REV CODE- 250/637: Performed by: INTERNAL MEDICINE

## 2017-11-25 PROCEDURE — 74011000258 HC RX REV CODE- 258: Performed by: INTERNAL MEDICINE

## 2017-11-25 PROCEDURE — 36415 COLL VENOUS BLD VENIPUNCTURE: CPT | Performed by: INTERNAL MEDICINE

## 2017-11-25 PROCEDURE — 74011250636 HC RX REV CODE- 250/636: Performed by: INTERNAL MEDICINE

## 2017-11-25 PROCEDURE — 74011636637 HC RX REV CODE- 636/637: Performed by: INTERNAL MEDICINE

## 2017-11-25 PROCEDURE — 80053 COMPREHEN METABOLIC PANEL: CPT | Performed by: INTERNAL MEDICINE

## 2017-11-25 PROCEDURE — 71010 XR CHEST PORT: CPT

## 2017-11-25 PROCEDURE — 85025 COMPLETE CBC W/AUTO DIFF WBC: CPT | Performed by: INTERNAL MEDICINE

## 2017-11-25 PROCEDURE — 82962 GLUCOSE BLOOD TEST: CPT

## 2017-11-25 PROCEDURE — 83735 ASSAY OF MAGNESIUM: CPT | Performed by: INTERNAL MEDICINE

## 2017-11-25 PROCEDURE — 65270000015 HC RM PRIVATE ONCOLOGY

## 2017-11-25 RX ORDER — INSULIN GLARGINE 100 [IU]/ML
40 INJECTION, SOLUTION SUBCUTANEOUS DAILY
Status: DISCONTINUED | OUTPATIENT
Start: 2017-11-26 | End: 2017-11-26 | Stop reason: HOSPADM

## 2017-11-25 RX ORDER — HYDROCODONE POLISTIREX AND CHLORPHENIRAMINE POLISTIREX 10; 8 MG/5ML; MG/5ML
5 SUSPENSION, EXTENDED RELEASE ORAL
Status: DISCONTINUED | OUTPATIENT
Start: 2017-11-25 | End: 2017-11-26 | Stop reason: HOSPADM

## 2017-11-25 RX ORDER — HYDROCORTISONE 1 %
CREAM (GRAM) TOPICAL 2 TIMES DAILY
Status: DISCONTINUED | OUTPATIENT
Start: 2017-11-25 | End: 2017-11-26 | Stop reason: HOSPADM

## 2017-11-25 RX ORDER — INSULIN GLARGINE 100 [IU]/ML
35 INJECTION, SOLUTION SUBCUTANEOUS DAILY
Status: DISCONTINUED | OUTPATIENT
Start: 2017-11-25 | End: 2017-11-25

## 2017-11-25 RX ORDER — INSULIN GLARGINE 100 [IU]/ML
5 INJECTION, SOLUTION SUBCUTANEOUS ONCE
Status: COMPLETED | OUTPATIENT
Start: 2017-11-25 | End: 2017-11-25

## 2017-11-25 RX ORDER — DIPHENHYDRAMINE HCL 12.5MG/5ML
25 ELIXIR ORAL
Status: DISCONTINUED | OUTPATIENT
Start: 2017-11-25 | End: 2017-11-26 | Stop reason: HOSPADM

## 2017-11-25 RX ORDER — HYDROCODONE BITARTRATE AND ACETAMINOPHEN 7.5; 325 MG/1; MG/1
1 TABLET ORAL
Status: DISCONTINUED | OUTPATIENT
Start: 2017-11-25 | End: 2017-11-26 | Stop reason: HOSPADM

## 2017-11-25 RX ORDER — GUAIFENESIN 100 MG/5ML
100 SOLUTION ORAL
Status: DISCONTINUED | OUTPATIENT
Start: 2017-11-25 | End: 2017-11-26 | Stop reason: HOSPADM

## 2017-11-25 RX ORDER — CARVEDILOL 12.5 MG/1
12.5 TABLET ORAL DAILY
Status: DISCONTINUED | OUTPATIENT
Start: 2017-11-25 | End: 2017-11-26 | Stop reason: HOSPADM

## 2017-11-25 RX ADMIN — ALPRAZOLAM 0.5 MG: 0.5 TABLET ORAL at 15:11

## 2017-11-25 RX ADMIN — ENOXAPARIN SODIUM 40 MG: 40 INJECTION SUBCUTANEOUS at 12:08

## 2017-11-25 RX ADMIN — Medication 400 MG: at 08:05

## 2017-11-25 RX ADMIN — GABAPENTIN 300 MG: 300 CAPSULE ORAL at 08:05

## 2017-11-25 RX ADMIN — CARVEDILOL 12.5 MG: 12.5 TABLET, FILM COATED ORAL at 12:16

## 2017-11-25 RX ADMIN — PIPERACILLIN SODIUM,TAZOBACTAM SODIUM 3.38 G: 3; .375 INJECTION, POWDER, FOR SOLUTION INTRAVENOUS at 15:44

## 2017-11-25 RX ADMIN — ALPRAZOLAM 0.5 MG: 0.5 TABLET ORAL at 21:36

## 2017-11-25 RX ADMIN — GABAPENTIN 300 MG: 300 CAPSULE ORAL at 21:36

## 2017-11-25 RX ADMIN — ALPRAZOLAM 0.5 MG: 0.5 TABLET ORAL at 05:25

## 2017-11-25 RX ADMIN — INSULIN LISPRO 7 UNITS: 100 INJECTION, SOLUTION INTRAVENOUS; SUBCUTANEOUS at 08:05

## 2017-11-25 RX ADMIN — Medication 400 MG: at 18:57

## 2017-11-25 RX ADMIN — HYDROCODONE BITARTRATE AND ACETAMINOPHEN 1 TABLET: 5; 325 TABLET ORAL at 07:45

## 2017-11-25 RX ADMIN — PIPERACILLIN SODIUM,TAZOBACTAM SODIUM 3.38 G: 3; .375 INJECTION, POWDER, FOR SOLUTION INTRAVENOUS at 07:47

## 2017-11-25 RX ADMIN — INSULIN GLARGINE 5 UNITS: 100 INJECTION, SOLUTION SUBCUTANEOUS at 12:10

## 2017-11-25 RX ADMIN — HYDROCODONE POLISTIREX AND CHLORPHENIRAMINE POLISTIREX 5 ML: 10; 8 SUSPENSION, EXTENDED RELEASE ORAL at 15:44

## 2017-11-25 RX ADMIN — GUAIFENESIN 100 MG: 200 SOLUTION ORAL at 00:41

## 2017-11-25 RX ADMIN — FENTANYL CITRATE 25 MCG: 50 INJECTION, SOLUTION INTRAMUSCULAR; INTRAVENOUS at 05:40

## 2017-11-25 RX ADMIN — HYDROCODONE BITARTRATE AND ACETAMINOPHEN 1 TABLET: 7.5; 325 TABLET ORAL at 12:16

## 2017-11-25 RX ADMIN — PANTOPRAZOLE SODIUM 40 MG: 40 TABLET, DELAYED RELEASE ORAL at 07:45

## 2017-11-25 RX ADMIN — FENTANYL CITRATE 25 MCG: 50 INJECTION, SOLUTION INTRAMUSCULAR; INTRAVENOUS at 15:11

## 2017-11-25 RX ADMIN — FENTANYL CITRATE 25 MCG: 50 INJECTION, SOLUTION INTRAMUSCULAR; INTRAVENOUS at 09:28

## 2017-11-25 RX ADMIN — FENTANYL CITRATE 25 MCG: 50 INJECTION, SOLUTION INTRAMUSCULAR; INTRAVENOUS at 18:57

## 2017-11-25 RX ADMIN — INSULIN LISPRO 4 UNITS: 100 INJECTION, SOLUTION INTRAVENOUS; SUBCUTANEOUS at 21:37

## 2017-11-25 RX ADMIN — FENTANYL CITRATE 25 MCG: 50 INJECTION, SOLUTION INTRAMUSCULAR; INTRAVENOUS at 00:41

## 2017-11-25 RX ADMIN — VENLAFAXINE HYDROCHLORIDE 75 MG: 37.5 CAPSULE, EXTENDED RELEASE ORAL at 07:45

## 2017-11-25 RX ADMIN — INSULIN LISPRO 7 UNITS: 100 INJECTION, SOLUTION INTRAVENOUS; SUBCUTANEOUS at 12:09

## 2017-11-25 RX ADMIN — INSULIN GLARGINE 35 UNITS: 100 INJECTION, SOLUTION SUBCUTANEOUS at 08:05

## 2017-11-25 RX ADMIN — GABAPENTIN 300 MG: 300 CAPSULE ORAL at 15:11

## 2017-11-25 RX ADMIN — FENTANYL CITRATE 25 MCG: 50 INJECTION, SOLUTION INTRAMUSCULAR; INTRAVENOUS at 20:36
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
## 2017-11-25 NOTE — PROGRESS NOTES
1900--bedside report received from alfredo Marmolejo family at bedside. Assessment as noted    2100--pt requiring pan medication throughout shift see MAR    2300--dr. malin notified pt requesting cough syrup persistent dry cough (MAR)    0400--pt unable to sleep, hyperactive,even requesting \"theraputc coloring book\", after talking with pt pt takes xanax a total of 4 times a day with bedtime dose being 1mg. Currently receiving 0.5mg TID. There are other discrepancies with home medication versus scheduled hospital medication, pt made list and will discuss with MD this AM.    0630--after pt cleaned room with wipes, RN compelled to give 0900 0.5mg xanax at 0600. And scheduled xanax was cancelled. Pt needs 1mg xanax at bedtime. the above information was also passed on to alfredo beach oncoming rn.    0700--bedside report given to alfredo beach who is assuming care of pt

## 2017-11-25 NOTE — PROGRESS NOTES
Hospitalist Progress Note    NAME: Rudi Orozco   :  1970   MRN:  [id number]       Assessment / Plan:  Severe DKA in Type 1, uncontrolled: off Insulin drip, anion gap has close, increased dose of Lantus to 40 units,  HbA1C 8.8. Off  IVF. C/w SSI  Sepsis POA: due to PNA, no fever, still has leukocytosis, off  IVF and c/w ABX, BC shows Staph coagulase negative, most likely contaminant, repeat BC so far negative, repeat CXR today  Aspiration PNA: c/w Zosyn (last day tomorrow) , D/C Vancomycin, c/w  bronchodilators, check sputum. Repeat CXR today  DEBORAH: monitor, c/w IVF. So far resolved  Hypotension: BP had improved, monitor resume Coreg. Use labetalol prn  Hyponatremia: drinking to much free water, will do fluid restriction  Volume depletion: so far resolved, monitor. Acute encephalopathy due to above. CT head negative. Likely metabolic, still confused, monitor,  Ammonia level is OK, Neurology help appreciated,  MRI is negative, patient seems back to her baseline. Multiple bruises forensic nurse eval on hold until patient's mental status improves and she can consent to exam.    Hypokalemia: replace and monitor. Hypomagnesemia: replace and monitor. Surrogate Decision Maker:  Unknown  Baseline:  Unknown   Body mass index is 28.54 kg/(m^2). Code status: Full   Prophylaxis: lovenox  Recommended Disposition: Home with family    D/c plan likely for tomorrow     Subjective:     Chief Complaint / Reason for Physician Visit  \"I feel better\". Discussed with RN events overnight. Review of Systems:  Symptom Y/N Comments  Symptom Y/N Comments   Fever/Chills    Chest Pain     Poor Appetite    Edema     Cough    Abdominal Pain     Sputum    Joint Pain     SOB/HERNÁNDEZ n   Pruritis/Rash     Nausea/vomit    Tolerating PT/OT     Diarrhea    Tolerating Diet y    Constipation    Other       Could NOT obtain due to:      Objective:     VITALS:   Last 24hrs VS reviewed since prior progress note.  Most recent are:  Patient Vitals for the past 24 hrs:   Temp Pulse Resp BP SpO2   11/25/17 0748 98.6 °F (37 °C) 90 18 135/82 100 %   11/25/17 0700 98.3 °F (36.8 °C) 99 18 (!) 132/93 99 %   11/24/17 2334 98.6 °F (37 °C) 96 18 150/80 97 %   11/24/17 1055 98.8 °F (37.1 °C) 96 18 127/68 96 %       Intake/Output Summary (Last 24 hours) at 11/25/17 1047  Last data filed at 11/25/17 0609   Gross per 24 hour   Intake              640 ml   Output                0 ml   Net              640 ml        PHYSICAL EXAM:  General: WD, WN. Alert, cooperative, no acute distress    EENT:  EOMI. Anicteric sclerae. MMM  Resp:  Coarse BS, rhonchi  CV:  Regular  rhythm,  No edema  GI:  Soft, Non distended, Non tender.  +Bowel sounds  Neurologic:  Alert and oriented X 3, normal speech,   Psych:   Fair   insight. Not anxious nor agitated  Skin:  No rashes. No jaundice    Reviewed most current lab test results and cultures  YES  Reviewed most current radiology test results   YES  Review and summation of old records today    NO  Reviewed patient's current orders and MAR    YES  PMH/SH reviewed - no change compared to H&P  ________________________________________________________________________  Care Plan discussed with:    Comments   Patient y    Family      RN y    Care Manager     Consultant                        Multidiciplinary team rounds were held today with , nursing, pharmacist and clinical coordinator. Patient's plan of care was discussed; medications were reviewed and discharge planning was addressed.      ________________________________________________________________________  Total NON critical care TIME:  30   Minutes    Total CRITICAL CARE TIME Spent:   Minutes non procedure based      Comments   >50% of visit spent in counseling and coordination of care y    ________________________________________________________________________  Diamond Rapp MD     Procedures: see electronic medical records for all procedures/Xrays and details which were not copied into this note but were reviewed prior to creation of Plan. LABS:  I reviewed today's most current labs and imaging studies. Pertinent labs include:  Recent Labs      11/25/17 0534 11/24/17 0330 11/23/17   0505   WBC  12.9*  12.0*  8.6   HGB  10.9*  11.1*  10.1*   HCT  33.6*  32.7*  30.9*   PLT  317  264  259     Recent Labs      11/25/17 0534 11/24/17 0330  11/23/17   2116   11/23/17   0645  11/22/17   1055   NA  129*  129*  124*   < >  142  143   K  4.2  3.4*  2.9*   < >  3.6  3.4*   CL  94*  93*  90*   < >  105  104   CO2  27  28  24   < >  25  26   GLU  310*  239*  346*   < >  297*  148*   BUN  6  9  10   < >  8  6   CREA  1.01  1.10*  1.42*   < >  1.10*  1.04*   CA  8.0*  7.3*  7.2*   < >  7.6*  7.7*   MG  1.9  1.4*   --    --   1.8  1.0*   PHOS   --    --    --    --   3.2  1.2*   ALB  2.5*  2.4*   --    --   2.2*   --    TBILI  0.4  0.5   --    --   0.5   --    SGOT  27  40*   --    --   74*   --    ALT  60  74   --    --   87*   --     < > = values in this interval not displayed.        Signed: Boo Farrar MD

## 2017-11-25 NOTE — PROGRESS NOTES
Problem: Falls - Risk of  Goal: *Absence of Falls  Document Deborah Fall Risk and appropriate interventions in the flowsheet.    Outcome: Progressing Towards Goal  Fall Risk Interventions:  Mobility Interventions: Bed/chair exit alarm    Mentation Interventions: Adequate sleep, hydration, pain control    Medication Interventions: Patient to call before getting OOB    Elimination Interventions: Call light in reach, Patient to call for help with toileting needs    History of Falls Interventions: Door open when patient unattended

## 2017-11-26 ENCOUNTER — HOME HEALTH ADMISSION (OUTPATIENT)
Dept: HOME HEALTH SERVICES | Facility: HOME HEALTH | Age: 47
End: 2017-11-26
Payer: MEDICAID

## 2017-11-26 VITALS
WEIGHT: 168.87 LBS | RESPIRATION RATE: 18 BRPM | DIASTOLIC BLOOD PRESSURE: 86 MMHG | BODY MASS INDEX: 28.99 KG/M2 | SYSTOLIC BLOOD PRESSURE: 140 MMHG | TEMPERATURE: 97.7 F | HEART RATE: 87 BPM | OXYGEN SATURATION: 97 %

## 2017-11-26 PROBLEM — J69.0 ASPIRATION PNEUMONIA (HCC): Status: ACTIVE | Noted: 2017-11-26

## 2017-11-26 LAB
ALBUMIN SERPL-MCNC: 2.5 G/DL (ref 3.5–5)
ALBUMIN/GLOB SERPL: 0.7 {RATIO} (ref 1.1–2.2)
ALP SERPL-CCNC: 79 U/L (ref 45–117)
ALT SERPL-CCNC: 51 U/L (ref 12–78)
ANION GAP SERPL CALC-SCNC: 8 MMOL/L (ref 5–15)
AST SERPL-CCNC: 23 U/L (ref 15–37)
BACTERIA SPEC CULT: ABNORMAL
BACTERIA SPEC CULT: ABNORMAL
BACTERIA SPEC CULT: NORMAL
BASOPHILS # BLD: 0 K/UL (ref 0–0.1)
BASOPHILS NFR BLD: 0 % (ref 0–1)
BILIRUB SERPL-MCNC: 0.2 MG/DL (ref 0.2–1)
BUN SERPL-MCNC: 9 MG/DL (ref 6–20)
BUN/CREAT SERPL: 9 (ref 12–20)
CALCIUM SERPL-MCNC: 8.1 MG/DL (ref 8.5–10.1)
CHLORIDE SERPL-SCNC: 96 MMOL/L (ref 97–108)
CO2 SERPL-SCNC: 26 MMOL/L (ref 21–32)
CREAT SERPL-MCNC: 0.99 MG/DL (ref 0.55–1.02)
DIFFERENTIAL METHOD BLD: ABNORMAL
EOSINOPHIL # BLD: 0 K/UL (ref 0–0.4)
EOSINOPHIL NFR BLD: 0 % (ref 0–7)
ERYTHROCYTE [DISTWIDTH] IN BLOOD BY AUTOMATED COUNT: 21.1 % (ref 11.5–14.5)
GLOBULIN SER CALC-MCNC: 3.6 G/DL (ref 2–4)
GLUCOSE BLD STRIP.AUTO-MCNC: 271 MG/DL (ref 65–100)
GLUCOSE SERPL-MCNC: 274 MG/DL (ref 65–100)
HCT VFR BLD AUTO: 31.1 % (ref 35–47)
HGB BLD-MCNC: 10.1 G/DL (ref 11.5–16)
LYMPHOCYTES # BLD: 4.3 K/UL (ref 0.8–3.5)
LYMPHOCYTES NFR BLD: 33 % (ref 12–49)
MAGNESIUM SERPL-MCNC: 1.8 MG/DL (ref 1.6–2.4)
MCH RBC QN AUTO: 33.2 PG (ref 26–34)
MCHC RBC AUTO-ENTMCNC: 32.5 G/DL (ref 30–36.5)
MCV RBC AUTO: 102.3 FL (ref 80–99)
METAMYELOCYTES NFR BLD MANUAL: 1 %
MONOCYTES # BLD: 0.4 K/UL (ref 0–1)
MONOCYTES NFR BLD: 3 % (ref 5–13)
NEUTS BAND NFR BLD MANUAL: 7 % (ref 0–6)
NEUTS SEG # BLD: 8.1 K/UL (ref 1.8–8)
NEUTS SEG NFR BLD: 56 % (ref 32–75)
PLATELET # BLD AUTO: 197 K/UL (ref 150–400)
POTASSIUM SERPL-SCNC: 4.7 MMOL/L (ref 3.5–5.1)
PROT SERPL-MCNC: 6.1 G/DL (ref 6.4–8.2)
RBC # BLD AUTO: 3.04 M/UL (ref 3.8–5.2)
RBC MORPH BLD: ABNORMAL
RBC MORPH BLD: ABNORMAL
SERVICE CMNT-IMP: ABNORMAL
SERVICE CMNT-IMP: ABNORMAL
SERVICE CMNT-IMP: NORMAL
SODIUM SERPL-SCNC: 130 MMOL/L (ref 136–145)
WBC # BLD AUTO: 12.9 K/UL (ref 3.6–11)
WBC MORPH BLD: ABNORMAL

## 2017-11-26 PROCEDURE — 74011250636 HC RX REV CODE- 250/636: Performed by: INTERNAL MEDICINE

## 2017-11-26 PROCEDURE — 82962 GLUCOSE BLOOD TEST: CPT

## 2017-11-26 PROCEDURE — 83735 ASSAY OF MAGNESIUM: CPT | Performed by: INTERNAL MEDICINE

## 2017-11-26 PROCEDURE — 74011000258 HC RX REV CODE- 258: Performed by: INTERNAL MEDICINE

## 2017-11-26 PROCEDURE — 74011250637 HC RX REV CODE- 250/637: Performed by: INTERNAL MEDICINE

## 2017-11-26 PROCEDURE — 74011636637 HC RX REV CODE- 636/637: Performed by: INTERNAL MEDICINE

## 2017-11-26 PROCEDURE — 36415 COLL VENOUS BLD VENIPUNCTURE: CPT | Performed by: INTERNAL MEDICINE

## 2017-11-26 PROCEDURE — 85025 COMPLETE CBC W/AUTO DIFF WBC: CPT | Performed by: INTERNAL MEDICINE

## 2017-11-26 PROCEDURE — 80053 COMPREHEN METABOLIC PANEL: CPT | Performed by: INTERNAL MEDICINE

## 2017-11-26 RX ORDER — LANOLIN ALCOHOL/MO/W.PET/CERES
400 CREAM (GRAM) TOPICAL 2 TIMES DAILY
Qty: 60 TAB | Refills: 1 | Status: SHIPPED | OUTPATIENT
Start: 2017-11-26 | End: 2018-01-30 | Stop reason: SDUPTHER

## 2017-11-26 RX ORDER — GUAIFENESIN 100 MG/5ML
400 SOLUTION ORAL 3 TIMES DAILY
Qty: 473 ML | Refills: 0 | Status: SHIPPED | OUTPATIENT
Start: 2017-11-26 | End: 2018-01-30

## 2017-11-26 RX ORDER — ALPRAZOLAM 0.5 MG/1
0.5 TABLET ORAL
Qty: 40 TAB | Refills: 0 | Status: SHIPPED | OUTPATIENT
Start: 2017-11-26 | End: 2018-07-24

## 2017-11-26 RX ORDER — HYDROCODONE BITARTRATE AND ACETAMINOPHEN 7.5; 325 MG/1; MG/1
1 TABLET ORAL
Qty: 30 TAB | Refills: 0 | Status: SHIPPED | OUTPATIENT
Start: 2017-11-26 | End: 2017-11-28

## 2017-11-26 RX ORDER — ACETAMINOPHEN 325 MG/1
650 TABLET ORAL
Qty: 40 TAB | Refills: 0 | Status: SHIPPED | OUTPATIENT
Start: 2017-11-26 | End: 2017-12-12

## 2017-11-26 RX ORDER — PROMETHAZINE HYDROCHLORIDE 25 MG/1
25 TABLET ORAL
Qty: 30 TAB | Refills: 0 | Status: SHIPPED | OUTPATIENT
Start: 2017-11-26 | End: 2018-05-11 | Stop reason: ALTCHOICE

## 2017-11-26 RX ORDER — CARVEDILOL 12.5 MG/1
12.5 TABLET ORAL 2 TIMES DAILY WITH MEALS
Qty: 60 TAB | Refills: 1 | Status: SHIPPED | OUTPATIENT
Start: 2017-11-26 | End: 2017-12-12 | Stop reason: SDUPTHER

## 2017-11-26 RX ORDER — INSULIN GLARGINE 100 [IU]/ML
40 INJECTION, SOLUTION SUBCUTANEOUS DAILY
Qty: 15 ML | Refills: 1 | Status: SHIPPED | OUTPATIENT
Start: 2017-11-26 | End: 2017-12-12 | Stop reason: SDUPTHER

## 2017-11-26 RX ORDER — AMOXICILLIN AND CLAVULANATE POTASSIUM 875; 125 MG/1; MG/1
1 TABLET, FILM COATED ORAL EVERY 12 HOURS
Qty: 10 TAB | Refills: 0 | Status: SHIPPED | OUTPATIENT
Start: 2017-11-26 | End: 2017-12-01

## 2017-11-26 RX ORDER — IBUPROFEN 200 MG
1 TABLET ORAL EVERY 24 HOURS
Qty: 30 PATCH | Refills: 0 | Status: SHIPPED | OUTPATIENT
Start: 2017-11-26 | End: 2017-12-26

## 2017-11-26 RX ORDER — INSULIN GLARGINE 100 [IU]/ML
40 INJECTION, SOLUTION SUBCUTANEOUS DAILY
Qty: 15 ML | Refills: 1 | Status: SHIPPED | OUTPATIENT
Start: 2017-11-26 | End: 2017-11-26

## 2017-11-26 RX ORDER — HYDROCODONE POLISTIREX AND CHLORPHENIRAMINE POLISTIREX 10; 8 MG/5ML; MG/5ML
5 SUSPENSION, EXTENDED RELEASE ORAL
Qty: 115 ML | Refills: 0 | Status: SHIPPED | OUTPATIENT
Start: 2017-11-26 | End: 2017-11-28 | Stop reason: ALTCHOICE

## 2017-11-26 RX ORDER — HYDROCORTISONE 1 %
CREAM (GRAM) TOPICAL 2 TIMES DAILY
Qty: 30 G | Refills: 0 | Status: SHIPPED | OUTPATIENT
Start: 2017-11-26 | End: 2018-01-30

## 2017-11-26 RX ORDER — FUROSEMIDE 20 MG/1
20 TABLET ORAL DAILY
Qty: 30 TAB | Refills: 1 | Status: SHIPPED | OUTPATIENT
Start: 2017-11-26 | End: 2017-12-12

## 2017-11-26 RX ORDER — VENLAFAXINE HYDROCHLORIDE 75 MG/1
75 CAPSULE, EXTENDED RELEASE ORAL
Qty: 30 CAP | Refills: 1 | Status: SHIPPED | OUTPATIENT
Start: 2017-11-26 | End: 2017-12-12

## 2017-11-26 RX ORDER — GABAPENTIN 300 MG/1
300 CAPSULE ORAL 3 TIMES DAILY
Qty: 90 CAP | Refills: 1 | Status: SHIPPED | OUTPATIENT
Start: 2017-11-26 | End: 2018-04-24 | Stop reason: SDUPTHER

## 2017-11-26 RX ORDER — POTASSIUM CHLORIDE 20 MEQ/1
20 TABLET, EXTENDED RELEASE ORAL DAILY
Qty: 30 TAB | Refills: 1 | Status: SHIPPED | OUTPATIENT
Start: 2017-11-26 | End: 2018-02-19 | Stop reason: SDUPTHER

## 2017-11-26 RX ORDER — PEN NEEDLE, DIABETIC 29 G X1/2"
1 NEEDLE, DISPOSABLE MISCELLANEOUS
Qty: 100 PEN NEEDLE | Refills: 1 | Status: SHIPPED | OUTPATIENT
Start: 2017-11-26 | End: 2018-01-26 | Stop reason: SDUPTHER

## 2017-11-26 RX ADMIN — HYDROCODONE BITARTRATE AND ACETAMINOPHEN 1 TABLET: 7.5; 325 TABLET ORAL at 07:23

## 2017-11-26 RX ADMIN — INSULIN LISPRO 5 UNITS: 100 INJECTION, SOLUTION INTRAVENOUS; SUBCUTANEOUS at 08:19

## 2017-11-26 RX ADMIN — HYDROCODONE BITARTRATE AND ACETAMINOPHEN 1 TABLET: 7.5; 325 TABLET ORAL at 00:00

## 2017-11-26 RX ADMIN — CARVEDILOL 12.5 MG: 12.5 TABLET, FILM COATED ORAL at 08:18

## 2017-11-26 RX ADMIN — Medication 400 MG: at 08:18

## 2017-11-26 RX ADMIN — INSULIN GLARGINE 40 UNITS: 100 INJECTION, SOLUTION SUBCUTANEOUS at 08:19

## 2017-11-26 RX ADMIN — ALPRAZOLAM 0.5 MG: 0.5 TABLET ORAL at 08:18

## 2017-11-26 RX ADMIN — FENTANYL CITRATE 25 MCG: 50 INJECTION, SOLUTION INTRAMUSCULAR; INTRAVENOUS at 00:43

## 2017-11-26 RX ADMIN — VENLAFAXINE HYDROCHLORIDE 75 MG: 37.5 CAPSULE, EXTENDED RELEASE ORAL at 08:18

## 2017-11-26 RX ADMIN — GABAPENTIN 300 MG: 300 CAPSULE ORAL at 08:17

## 2017-11-26 RX ADMIN — PANTOPRAZOLE SODIUM 40 MG: 40 TABLET, DELAYED RELEASE ORAL at 08:18

## 2017-11-26 RX ADMIN — FENTANYL CITRATE 25 MCG: 50 INJECTION, SOLUTION INTRAMUSCULAR; INTRAVENOUS at 04:49

## 2017-11-26 RX ADMIN — PIPERACILLIN SODIUM,TAZOBACTAM SODIUM 3.38 G: 3; .375 INJECTION, POWDER, FOR SOLUTION INTRAVENOUS at 00:00

## 2017-11-26 NOTE — DISCHARGE INSTRUCTIONS
HOSPITALIST DISCHARGE INSTRUCTIONS  NAME: Noman Bustos   :  1970   MRN:  [id number]     Date/Time:  2017 8:20 AM    ADMIT DATE: 2017     DISCHARGE DATE: 2017     DIAGNOSIS:  DKA, DM, HTN, Sepsis, Aspiration Pneumonia, DEBORAH, Hyponatremia, Encephalopathy, Hypokalemia, Hypomagnesemia    MEDICATIONS:                As per medication reconciliation    · It is important that you take the medication exactly as they are prescribed. · Keep your medication in the bottles provided by the pharmacist and keep a list of the medication names, dosages, and times to be taken in your wallet. · Do not take other medications without consulting your doctor. Pain Management: per above medications    What to do at Home    Recommended diet:  Cardiac Diet and Diabetic Diet    Recommended activity: Activity as tolerated and No driving while on analgesics    If you experience any of the following symptoms then please call your primary care physician or return to the emergency room if you cannot get hold of your doctor:  Fever, chills, nausea, vomiting, diarrhea, change in mentation, falling, bleeding, shortness of breath. Follow Up:   PCP you are to call and set up an appointment to see them in 2 week. F/U Endocrinology      Information obtained by :  I understand that if any problems occur once I am at home I am to contact my physician. I understand and acknowledge receipt of the instructions indicated above.                                                                                                                                            Physician's or R.N.'s Signature                                                                  Date/Time                                                                                                                                              Patient or Representative Signature                                                          Date/Time

## 2017-11-26 NOTE — PROGRESS NOTES
CM sent request to [address] [place] [address] for [facility] services for pt. CM will continue to follow to determine if pt has been accepted for services.      Aranza Hood, MSW, [address]   [phone]

## 2017-11-26 NOTE — FACE TO FACE
Home Health Care Discharge Planning: [facility]  Face to Face Encounter      NAME: Elisabeth Alaniz   :  1970   MRN:  [id number]     Primary Diagnosis: DKA, DM, HTN, Sepsis, Aspiration Pneumonia, DEBORAH, Hyponatremia, Encephalopathy, Hypokalemia, Hypomagnesemia    Date of Face to Face:  2017 8:19 AM                                  Face to Face Encounter findings are related to primary reason for home care:   YES    1. I certify that the patient needs intermittent skilled nursing care, physical therapy and/or speech therapy. I will not be following this patient in the Community and Dr. Carol Pate DO will be responsible for signing the [address] 2. Initial Orders for Care: Physical Therapy and Occupational Therapy    3. I certify that this patient is homebound because of illness or injury, need the aid of supportive devices such as crutches, canes, wheelchairs, and walkers; the use of special transportation; or the assistance of another person in order to leave their place of residence. There exists a normal inability to leave home and leaving home requires a considerable and taxing effort. 4. I certify that this patient is under my care and that I had a Face-to-Face Encounter that meets the physician Face-to-Face Encounter requirements. Document the physical findings from the Face-to-Face Encounter that support the need for skilled services: Has new diagnosis that requires skilled nursing teaching and intervention , Has new medications that requires skilled nursing teaching and monitoring for understanding and compliance , Needs skilled safety assessment and interventions  and Has new finding of weakness and altered mobility that requires skilled physical/occupational and/or speech therapy services for evaluation and interventions.      Cris Pham MD  Discharging Physician  Office: [phone]  Fax:   [phone]

## 2017-11-26 NOTE — DISCHARGE SUMMARY
Hospitalist Discharge Summary     Patient ID:  Kee Dominguez  [id number]  32 y.o.  1970    PCP on record: Fred Meadows III, DO    Admit date: 11/20/2017  Discharge date and time: 11/26/2017      DISCHARGE DIAGNOSIS:    DKA, DM, HTN, Sepsis, Aspiration Pneumonia, DEBORAH, Hyponatremia, Encephalopathy, Hypokalemia, Hypomagnesemia      CONSULTATIONS:  IP CONSULT TO NEUROLOGY    Excerpted HPI from H&P of Gaudencio Valdovinos MD:  Ofelia Daniel is a 56 y.o.  female with Type 1 DM who presents with DKA. Patient was dropped off the ER by someone who said that they will just \"park their vehicle\" but they never came back. Patient found with multiple bruises on exam and labs consistent with DKA, DEBORAH and hyponatremia. Patient is not able to provide a history. Stat CT head negative. We were asked to admit for work up and evaluation of the above problems. ______________________________________________________________________  DISCHARGE SUMMARY/HOSPITAL COURSE:  for full details see H&P, daily progress notes, labs, consult notes. Severe DKA in Type 1, uncontrolled: off Insulin drip, anion gap has close, increased dose of Lantus to 40 units,  HbA1C 8.8. Off  IVF. C/w SSI  Sepsis POA: due to PNA, no fever, still has leukocytosis, off  IVF and c/w ABX, BC shows Staph coagulase negative, most likely contaminant, repeat BC so far negative, repeat CXR today  Aspiration PNA: c/w Zosyn (last day tomorrow) , D/C Vancomycin, c/w  bronchodilators, check sputum. Repeat CXR today  DEBORAH: monitor, c/w IVF. So far resolved  Hypotension: BP had improved, monitor resume Coreg. Use labetalol prn  Hyponatremia: drinking to much free water, will do fluid restriction  Volume depletion: so far resolved, monitor.   Acute encephalopathy due to above.  CT head negative.  Likely metabolic, still confused, monitor,  Ammonia level is OK, Neurology help appreciated,  MRI is negative, patient seems back to her baseline. Multiple bruises forensic nurse eval on hold until patient's mental status improves and she can consent to exam.    Hypokalemia: replace and monitor. Hypomagnesemia: replace and monitor. Surrogate Decision Maker:  Unknown  Baseline:  Unknown   Body mass index is 28.54 kg/(m^2). Code status: Full   Prophylaxis: lovenox  Recommended Disposition: Home with family     D/c Home today and F/U with PCP and Endocrinology as outpatient  _______________________________________________________________________  Patient seen and examined by me on discharge day. Pertinent Findings:  Gen:    Not in distress  Chest: Coarse BS  CVS:   Regular rhythm. No edema  Abd:  Soft, not distended, not tender  Neuro:  Alert, GCS 15    Home Health Care Discharge Planning: [facility]  Face to Face Encounter      NAME: Kee Dominguez   :  1970   MRN:  [id number]     Primary Diagnosis: DKA, DM, HTN, Sepsis, Aspiration Pneumonia, DEBORAH, Hyponatremia, Encephalopathy, Hypokalemia, Hypomagnesemia    Date of Face to Face:  2017 8:19 AM                                  Face to Face Encounter findings are related to primary reason for home care:   YES    1. I certify that the patient needs intermittent skilled nursing care, physical therapy and/or speech therapy. I will not be following this patient in the Community and Dr. Reyes Liberty, DO will be responsible for signing the [address] 2. Initial Orders for Care: Physical Therapy and Occupational Therapy    3. I certify that this patient is homebound because of illness or injury, need the aid of supportive devices such as crutches, canes, wheelchairs, and walkers; the use of special transportation; or the assistance of another person in order to leave their place of residence. There exists a normal inability to leave home and leaving home requires a considerable and taxing effort.      4. I certify that this patient is under my care and that I had a Face-to-Face Encounter that meets the physician Face-to-Face Encounter requirements. Document the physical findings from the Face-to-Face Encounter that support the need for skilled services: Has new diagnosis that requires skilled nursing teaching and intervention , Has new medications that requires skilled nursing teaching and monitoring for understanding and compliance , Needs skilled safety assessment and interventions  and Has new finding of weakness and altered mobility that requires skilled physical/occupational and/or speech therapy services for evaluation and interventions. Jose Wilkes MD  Discharging Physician  Office: [phone]  Fax:   [phone]    _______________________________________________________________________  DISCHARGE MEDICATIONS:   Current Discharge Medication List      START taking these medications    Details   acetaminophen (TYLENOL) 325 mg tablet Take 2 Tabs by mouth every six (6) hours as needed. Qty: 40 Tab, Refills: 0      chlorpheniramine-HYDROcodone (TUSSIONEX) 10-8 mg/5 mL suspension Take 5 mL by mouth every twelve (12) hours as needed for Cough. Max Daily Amount: 10 mL. Qty: 115 mL, Refills: 0      guaiFENesin (ROBITUSSIN) 100 mg/5 mL liquid Take 20 mL by mouth three (3) times daily. Qty: 473 mL, Refills: 0      HYDROcodone-acetaminophen (NORCO) 7.5-325 mg per tablet Take 1 Tab by mouth every six (6) hours as needed. Max Daily Amount: 4 Tabs. Qty: 30 Tab, Refills: 0      hydrocortisone (CORTAID) 1 % topical cream Apply  to affected area two (2) times a day. use thin layer as directed  Qty: 30 g, Refills: 0      magnesium oxide (MAG-OX) 400 mg tablet Take 1 Tab by mouth two (2) times a day. Qty: 60 Tab, Refills: 1      potassium chloride (K-DUR, KLOR-CON) 20 mEq tablet Take 1 Tab by mouth daily.   Qty: 30 Tab, Refills: 1      amoxicillin-clavulanate (AUGMENTIN) 875-125 mg per tablet Take 1 Tab by mouth every twelve (12) hours for 5 days.  Qty: 10 Tab, Refills: 0         CONTINUE these medications which have CHANGED    Details   ALPRAZolam (XANAX) 0.5 mg tablet Take 1 Tab by mouth three (3) times daily as needed for Anxiety. Max Daily Amount: 1.5 mg.  Qty: 40 Tab, Refills: 0      gabapentin (NEURONTIN) 300 mg capsule Take 1 Cap by mouth three (3) times daily. Qty: 90 Cap, Refills: 1      venlafaxine-SR (EFFEXOR-XR) 75 mg capsule Take 1 Cap by mouth daily (with breakfast). Qty: 30 Cap, Refills: 1      promethazine (PHENERGAN) 25 mg tablet Take 1 Tab by mouth every six (6) hours as needed for Nausea. Qty: 30 Tab, Refills: 0      nicotine (NICODERM CQ) 21 mg/24 hr 1 Patch by TransDERmal route every twenty-four (24) hours for 30 days. Qty: 30 Patch, Refills: 0      carvedilol (COREG) 12.5 mg tablet Take 1 Tab by mouth two (2) times daily (with meals). Qty: 60 Tab, Refills: 1      furosemide (LASIX) 20 mg tablet Take 1 Tab by mouth daily. Qty: 30 Tab, Refills: 1      insulin glargine (LANTUS SOLOSTAR) 100 unit/mL (3 mL) inpn 40 Units by SubCUTAneous route daily. Qty: 15 mL, Refills: 1         CONTINUE these medications which have NOT CHANGED    Details   esomeprazole (NEXIUM) 40 mg capsule Take 1 Cap by mouth daily. Qty: 30 Cap, Refills: 9      insulin aspart (NOVOLOG FLEXPEN) 100 unit/mL inpn 1:15 for carbs and 1:50 > 150 for correction.   Max 50 units per day--may sub humalog or apidra if preferred  Qty: 15 mL, Refills: 11      glucagon (GLUCAGON EMERGENCY KIT, HUMAN,) 1 mg injection Use as directed  Qty: 2 Kit, Refills: 11         STOP taking these medications       insulin lispro (HUMALOG) 100 unit/mL injection Comments:   Reason for Stopping:         losartan (COZAAR) 25 mg tablet Comments:   Reason for Stopping:         atorvastatin (LIPITOR) 40 mg tablet Comments:   Reason for Stopping:         pantoprazole (PROTONIX) 40 mg tablet Comments:   Reason for Stopping:               My Recommended Diet, Activity, Wound Care, and follow-up labs are listed in the patient's Discharge Insturctions which I have personally completed and reviewed.     _______________________________________________________________________  DISPOSITION:    Home with Family:    Home with HH/PT/OT/RN: y   SNF/LTC:    CHIDI:    OTHER:        Condition at Discharge:  Stable  _______________________________________________________________________  Follow up with:   PCP : Denis Carnes III, DO  Follow-up Information     Follow up With Details Comments DO Jasmina   [address]  [facility] [place] [facility] [address]  [address] [facility]  [phone]          F/U PCP  F/U Endocrinology      Total time in minutes spent coordinating this discharge (includes going over instructions, follow-up, prescriptions, and preparing report for sign off to her PCP) :  35 minutes    Signed:  Blayne Mccarty MD

## 2017-11-26 NOTE — PROGRESS NOTES
Oncology Nursing Communication Tool  8:06 AM  11/26/2017     Bedside shift change report given to ALVARO Bazan (incoming nurse) by Von Collins (outgoing nurse) on [facility]. Report included the following information SBAR, Kardex, Intake/Output, MAR and Recent Results. Significant changes during shift: none      Issues for physician to address: discharge        Oncology Shift Note   Admission Date 11/20/2017   Admission Diagnosis DKA (diabetic ketoacidoses) ([facility] [address]   Code Status Full Code   Consults IP CONSULT TO NEUROLOGY      Cardiac Monitoring [] Yes [x] No      Purposeful Hourly Rounding [x] Yes    Deborah Score Total Score: 2   Deborah score 3 or > [] Bed Alarm [] Avasys [] 1:1 sitter [] Patient refused (Place signed refusal form in chart)      Pain Managed [] Yes [x] No    Key Pain Meds     The patient is on no pain meds. Influenza Vaccine Received Flu Vaccine for Current Season (usually Sept-March): Yes           Oxygen needs?  [x] Room air Oxygen @  []1L    []2L    []3L   []4L    []5L   []6L     Use home O2? [] Yes [] No  Perform O2 challenge test using  smartphrase (.Homeoxygen)      Last bowel movement Last Bowel Movement Date: 11/24/17      Urinary Catheter [REMOVED] Urinary Catheter 11/20/17 2- way-Criteria for Appropriate Use: Strict I/Os     [REMOVED] Urinary Catheter 11/20/17 2- way-Urine Output (mL): 300 ml     LDAs               Peripheral IV 11/22/17 Left Forearm (Active)   Site Assessment Clean, dry, & intact 11/26/2017  2:30 AM   Phlebitis Assessment 0 11/26/2017  2:30 AM   Infiltration Assessment 0 11/26/2017  2:30 AM   Dressing Status Clean, dry, & intact 11/26/2017  2:30 AM   Dressing Type Tape;Transparent 11/26/2017  2:30 AM   Hub Color/Line Status Pink;Flushed;Patent 11/26/2017  2:30 AM   Action Taken Other (comment) 11/26/2017  2:30 AM   Alcohol Cap Used No 11/24/2017  8:00 AM                         Readmission Risk Assessment Tool Score Medium Risk 17       Total Score        3 Has Seen PCP in Last 6 Months (Yes=3, No=0)    2 . Living with Significant Other. Assisted Living. LTAC. SNF. or   Rehab    3 Patient Length of Stay (>5 days = 3)    4 Pt. Coverage (Medicare=5 , Medicaid, or Self-Pay=4)    5 Charlson Comorbidity Score (Age + Comorbid Conditions)        Criteria that do not apply:    IP Visits Last 12 Months (1-3=4, 4=9, >4=11)       Expected Length of Stay 4d 21h   Actual Length of Stay 6          Opportunity for questions and clarifications were given to the incoming nurse. Patient's bed is in low position, side rails x2, door open PRN, call bell within reach and patient not in distress.       Jose Gloria

## 2017-11-26 NOTE — PROGRESS NOTES
Patient being discharged home. Medications reviewed at bedside. Opportunity for questions and clarification was provided.

## 2017-11-26 NOTE — PROGRESS NOTES
Oncology Nursing Communication Tool  7:55 PM  11/25/2017     Bedside shift change report given to Eryn Jimenes RN (incoming nurse) by Wendy Patton RN (outgoing nurse) on [facility]. Report included the following information SBAR and Kardex. Significant changes during shift: chest xray today. Discharge tomorrow? Issues for physician to address: none         Oncology Shift Note   Admission Date 11/20/2017   Admission Diagnosis DKA (diabetic ketoacidoses) ([facility] [address]   Code Status Full Code   Consults IP CONSULT TO NEUROLOGY      Cardiac Monitoring [] Yes [] No      Purposeful Hourly Rounding [] Yes    Deborah Score Total Score: 2   Deborah score 3 or > [] Bed Alarm [] Avasys [] 1:1 sitter [] Patient refused (Place signed refusal form in chart)      Pain Managed [] Yes [] No    Key Pain Meds     The patient is on no pain meds. Influenza Vaccine Received Flu Vaccine for Current Season (usually Sept-March): Yes           Oxygen needs?  [] Room air Oxygen @  []1L    []2L    []3L   []4L    []5L   []6L     Use home O2? [] Yes [] No  Perform O2 challenge test using  smartphrase (.Homeoxygen)      Last bowel movement Last Bowel Movement Date: 11/23/17      Urinary Catheter [REMOVED] Urinary Catheter 11/20/17 2- way-Criteria for Appropriate Use: Strict I/Os     [REMOVED] Urinary Catheter 11/20/17 2- way-Urine Output (mL): 300 ml     LDAs               Peripheral IV 11/22/17 Left Forearm (Active)   Site Assessment Clean, dry, & intact 11/25/2017  3:11 PM   Phlebitis Assessment 0 11/25/2017  3:11 PM   Infiltration Assessment 0 11/25/2017  3:11 PM   Dressing Status Clean, dry, & intact 11/25/2017  3:11 PM   Dressing Type Transparent 11/25/2017  3:11 PM   Hub Color/Line Status Pink;Capped 11/25/2017  3:11 PM   Action Taken Open ports on tubing capped 11/24/2017  8:00 AM   Alcohol Cap Used No 11/24/2017  8:00 AM                         Readmission Risk Assessment Tool Score Medium Risk            17 Total Score        3 Has Seen PCP in Last 6 Months (Yes=3, No=0)    2 . Living with Significant Other. Assisted Living. LTAC. SNF. or   Rehab    3 Patient Length of Stay (>5 days = 3)    4 Pt. Coverage (Medicare=5 , Medicaid, or Self-Pay=4)    5 Charlson Comorbidity Score (Age + Comorbid Conditions)        Criteria that do not apply:    IP Visits Last 12 Months (1-3=4, 4=9, >4=11)       Expected Length of Stay 4d 21h   Actual Length of Stay 5          Opportunity for questions and clarifications were given to the incoming nurse. Patient's bed is in low position, side rails x2, door open PRN, call bell within reach and patient not in distress.       Charlene Dickerson RN

## 2017-11-27 NOTE — TELEPHONE ENCOUNTER
Called and left her a voicemail letting her know I was away on vacation last week and saw that she was in the hospital and wanted to check on her. Asked her to either call the office tomorrow or send me a message through [employer] if she needs anything.

## 2017-11-28 ENCOUNTER — HOME CARE VISIT (OUTPATIENT)
Dept: HOME HEALTH SERVICES | Facility: HOME HEALTH | Age: 47
End: 2017-11-28
Payer: MEDICAID

## 2017-11-28 ENCOUNTER — HOSPITAL ENCOUNTER (EMERGENCY)
Age: 47
Discharge: HOME | End: 2017-11-28
Attending: EMERGENCY MEDICINE
Payer: MEDICAID

## 2017-11-28 ENCOUNTER — APPOINTMENT (OUTPATIENT)
Dept: GENERAL RADIOLOGY | Age: 47
End: 2017-11-28
Attending: EMERGENCY MEDICINE
Payer: MEDICAID

## 2017-11-28 ENCOUNTER — APPOINTMENT (OUTPATIENT)
Dept: CT IMAGING | Age: 47
End: 2017-11-28
Attending: EMERGENCY MEDICINE
Payer: MEDICAID

## 2017-11-28 VITALS
WEIGHT: 170 LBS | BODY MASS INDEX: 28.32 KG/M2 | HEIGHT: 65 IN | SYSTOLIC BLOOD PRESSURE: 120 MMHG | DIASTOLIC BLOOD PRESSURE: 81 MMHG | RESPIRATION RATE: 14 BRPM | HEART RATE: 93 BPM | TEMPERATURE: 97.7 F | OXYGEN SATURATION: 99 %

## 2017-11-28 DIAGNOSIS — R10.9 LEFT FLANK PAIN: ICD-10-CM

## 2017-11-28 DIAGNOSIS — S93.401A SPRAIN OF RIGHT ANKLE, UNSPECIFIED LIGAMENT, INITIAL ENCOUNTER: ICD-10-CM

## 2017-11-28 DIAGNOSIS — E86.0 DEHYDRATION: ICD-10-CM

## 2017-11-28 DIAGNOSIS — S09.90XA CLOSED HEAD INJURY, INITIAL ENCOUNTER: ICD-10-CM
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
## 2017-11-28 LAB
ALBUMIN SERPL-MCNC: 2.6 G/DL (ref 3.5–5)
ALBUMIN/GLOB SERPL: 0.7 {RATIO} (ref 1.1–2.2)
ALP SERPL-CCNC: 81 U/L (ref 45–117)
ALT SERPL-CCNC: 40 U/L (ref 12–78)
ANION GAP SERPL CALC-SCNC: 6 MMOL/L (ref 5–15)
APPEARANCE UR: CLEAR
AST SERPL-CCNC: 25 U/L (ref 15–37)
BACTERIA SPEC CULT: NORMAL
BASOPHILS # BLD: 0.1 K/UL
BASOPHILS NFR BLD: 1 %
BILIRUB SERPL-MCNC: 0.2 MG/DL (ref 0.2–1)
BILIRUB UR QL: NEGATIVE
BUN SERPL-MCNC: 15 MG/DL (ref 6–20)
BUN/CREAT SERPL: 11 (ref 12–20)
CALCIUM SERPL-MCNC: 8.5 MG/DL (ref 8.5–10.1)
CHLORIDE SERPL-SCNC: 102 MMOL/L (ref 97–108)
CK SERPL-CCNC: 326 U/L (ref 26–192)
CO2 SERPL-SCNC: 26 MMOL/L (ref 21–32)
COLOR UR: NORMAL
CREAT SERPL-MCNC: 1.34 MG/DL (ref 0.55–1.02)
EOSINOPHIL # BLD: 0.3 K/UL
EOSINOPHIL NFR BLD: 2 %
ERYTHROCYTE [DISTWIDTH] IN BLOOD BY AUTOMATED COUNT: 20.5 % (ref 11.5–14.5)
GLOBULIN SER CALC-MCNC: 3.5 G/DL (ref 2–4)
GLUCOSE BLD STRIP.AUTO-MCNC: 126 MG/DL (ref 65–100)
GLUCOSE SERPL-MCNC: 85 MG/DL (ref 65–100)
GLUCOSE UR STRIP.AUTO-MCNC: NEGATIVE MG/DL
HCT VFR BLD AUTO: 30.7 % (ref 35–47)
HGB BLD-MCNC: 10.2 G/DL (ref 11.5–16)
HGB UR QL STRIP: NEGATIVE
INR PPP: 1.1 (ref 0.9–1.1)
KETONES UR QL STRIP.AUTO: NEGATIVE MG/DL
LEUKOCYTE ESTERASE UR QL STRIP.AUTO: NEGATIVE
LIPASE SERPL-CCNC: 70 U/L (ref 73–393)
LYMPHOCYTES # BLD: 3 K/UL
LYMPHOCYTES NFR BLD: 24 %
MAGNESIUM SERPL-MCNC: 1.8 MG/DL (ref 1.6–2.4)
MCH RBC QN AUTO: 33.8 PG (ref 26–34)
MCHC RBC AUTO-ENTMCNC: 33.2 G/DL (ref 30–36.5)
MCV RBC AUTO: 101.7 FL (ref 80–99)
METAMYELOCYTES NFR BLD MANUAL: 1 %
MONOCYTES # BLD: 0.8 K/UL
MONOCYTES NFR BLD: 6 %
NEUTS BAND NFR BLD MANUAL: 6 %
NEUTS SEG # BLD: 8.4 K/UL
NEUTS SEG NFR BLD: 60 %
NITRITE UR QL STRIP.AUTO: NEGATIVE
PH UR STRIP: 5 [PH] (ref 5–8)
PLATELET # BLD AUTO: 436 K/UL (ref 150–400)
POTASSIUM SERPL-SCNC: 4.4 MMOL/L (ref 3.5–5.1)
PROT SERPL-MCNC: 6.1 G/DL (ref 6.4–8.2)
PROT UR STRIP-MCNC: NEGATIVE MG/DL
PROTHROMBIN TIME: 10.6 SEC (ref 9–11.1)
RBC # BLD AUTO: 3.02 M/UL (ref 3.8–5.2)
RBC MORPH BLD: ABNORMAL
SERVICE CMNT-IMP: ABNORMAL
SERVICE CMNT-IMP: NORMAL
SODIUM SERPL-SCNC: 134 MMOL/L (ref 136–145)
SP GR UR REFRACTOMETRY: 1.01 (ref 1–1.03)
TROPONIN I SERPL-MCNC: <0.04 NG/ML
UROBILINOGEN UR QL STRIP.AUTO: 0.2 EU/DL (ref 0.2–1)
WBC # BLD AUTO: 12.7 K/UL (ref 3.6–11)
WBC MORPH BLD: ABNORMAL

## 2017-11-28 PROCEDURE — 83690 ASSAY OF LIPASE: CPT | Performed by: EMERGENCY MEDICINE

## 2017-11-28 PROCEDURE — 85025 COMPLETE CBC W/AUTO DIFF WBC: CPT | Performed by: EMERGENCY MEDICINE

## 2017-11-28 PROCEDURE — 85610 PROTHROMBIN TIME: CPT | Performed by: EMERGENCY MEDICINE

## 2017-11-28 PROCEDURE — 80053 COMPREHEN METABOLIC PANEL: CPT | Performed by: EMERGENCY MEDICINE

## 2017-11-28 PROCEDURE — 36415 COLL VENOUS BLD VENIPUNCTURE: CPT | Performed by: EMERGENCY MEDICINE

## 2017-11-28 PROCEDURE — 82550 ASSAY OF CK (CPK): CPT | Performed by: EMERGENCY MEDICINE

## 2017-11-28 PROCEDURE — 99284 EMERGENCY DEPT VISIT MOD MDM: CPT

## 2017-11-28 PROCEDURE — 96374 THER/PROPH/DIAG INJ IV PUSH: CPT

## 2017-11-28 PROCEDURE — 72100 X-RAY EXAM L-S SPINE 2/3 VWS: CPT

## 2017-11-28 PROCEDURE — 81003 URINALYSIS AUTO W/O SCOPE: CPT | Performed by: EMERGENCY MEDICINE

## 2017-11-28 PROCEDURE — 93005 ELECTROCARDIOGRAM TRACING: CPT

## 2017-11-28 PROCEDURE — 73610 X-RAY EXAM OF ANKLE: CPT

## 2017-11-28 PROCEDURE — 83735 ASSAY OF MAGNESIUM: CPT | Performed by: EMERGENCY MEDICINE

## 2017-11-28 PROCEDURE — 74011250636 HC RX REV CODE- 250/636: Performed by: EMERGENCY MEDICINE

## 2017-11-28 PROCEDURE — 82962 GLUCOSE BLOOD TEST: CPT

## 2017-11-28 PROCEDURE — 96376 TX/PRO/DX INJ SAME DRUG ADON: CPT

## 2017-11-28 PROCEDURE — 96361 HYDRATE IV INFUSION ADD-ON: CPT

## 2017-11-28 PROCEDURE — 73502 X-RAY EXAM HIP UNI 2-3 VIEWS: CPT

## 2017-11-28 PROCEDURE — 96375 TX/PRO/DX INJ NEW DRUG ADDON: CPT

## 2017-11-28 PROCEDURE — 84484 ASSAY OF TROPONIN QUANT: CPT | Performed by: EMERGENCY MEDICINE

## 2017-11-28 PROCEDURE — 70450 CT HEAD/BRAIN W/O DYE: CPT

## 2017-11-28 RX ORDER — MORPHINE SULFATE 2 MG/ML
4 INJECTION, SOLUTION INTRAMUSCULAR; INTRAVENOUS
Status: COMPLETED | OUTPATIENT
Start: 2017-11-28 | End: 2017-11-28

## 2017-11-28 RX ORDER — OXYCODONE AND ACETAMINOPHEN 5; 325 MG/1; MG/1
1 TABLET ORAL
Qty: 20 TAB | Refills: 0 | Status: SHIPPED | OUTPATIENT
Start: 2017-11-28 | End: 2017-12-12

## 2017-11-28 RX ORDER — ONDANSETRON 2 MG/ML
4 INJECTION INTRAMUSCULAR; INTRAVENOUS
Status: COMPLETED | OUTPATIENT
Start: 2017-11-28 | End: 2017-11-28

## 2017-11-28 RX ORDER — HYDROCODONE BITARTRATE AND ACETAMINOPHEN 5; 325 MG/1; MG/1
1 TABLET ORAL
Qty: 20 TAB | Refills: 0 | Status: SHIPPED | OUTPATIENT
Start: 2017-11-28 | End: 2017-11-28

## 2017-11-28 RX ORDER — HYDROMORPHONE HYDROCHLORIDE 2 MG/ML
0.5 INJECTION, SOLUTION INTRAMUSCULAR; INTRAVENOUS; SUBCUTANEOUS
Status: COMPLETED | OUTPATIENT
Start: 2017-11-28 | End: 2017-11-28

## 2017-11-28 RX ADMIN — HYDROMORPHONE HYDROCHLORIDE 0.5 MG: 2 INJECTION, SOLUTION INTRAMUSCULAR; INTRAVENOUS; SUBCUTANEOUS at 21:02

## 2017-11-28 RX ADMIN — ONDANSETRON 4 MG: 2 INJECTION INTRAMUSCULAR; INTRAVENOUS at 18:57

## 2017-11-28 RX ADMIN — SODIUM CHLORIDE 500 ML: 900 INJECTION, SOLUTION INTRAVENOUS at 19:30

## 2017-11-28 RX ADMIN — SODIUM CHLORIDE 500 ML: 900 INJECTION, SOLUTION INTRAVENOUS at 20:36

## 2017-11-28 RX ADMIN — MORPHINE SULFATE 4 MG: 2 INJECTION, SOLUTION INTRAMUSCULAR; INTRAVENOUS at 18:58

## 2017-11-28 RX ADMIN — ONDANSETRON 4 MG: 2 INJECTION INTRAMUSCULAR; INTRAVENOUS at 21:02

## 2017-11-28 NOTE — ED TRIAGE NOTES
Assumed care of pt via wheelchair from triage. Pt reports bilat ankle pain, right shoulder pain, back pain, facial pain, and neck pain following a fall. Pt reports no LOC and felt she just missed the step due to being tired. Pt was just discharged from [facility] ICU on Sunday due to DKA. Pt resting in POC on stretcher with family at bedside. No acute distress noted at this time.

## 2017-11-29 ENCOUNTER — HOME CARE VISIT (OUTPATIENT)
Dept: SCHEDULING | Facility: HOME HEALTH | Age: 47
End: 2017-11-29
Payer: MEDICAID

## 2017-11-29 ENCOUNTER — HOME CARE VISIT (OUTPATIENT)
Dept: HOME HEALTH SERVICES | Facility: HOME HEALTH | Age: 47
End: 2017-11-29
Payer: MEDICAID

## 2017-11-29 LAB
ATRIAL RATE: 88 BPM
CALCULATED P AXIS, ECG09: 51 DEGREES
CALCULATED R AXIS, ECG10: -4 DEGREES
CALCULATED T AXIS, ECG11: 59 DEGREES
DIAGNOSIS, 93000: NORMAL
P-R INTERVAL, ECG05: 162 MS
Q-T INTERVAL, ECG07: 362 MS
QRS DURATION, ECG06: 84 MS
QTC CALCULATION (BEZET), ECG08: 438 MS
VENTRICULAR RATE, ECG03: 88 BPM

## 2017-11-29 NOTE — ED PROVIDER NOTES
EMERGENCY DEPARTMENT HISTORY AND PHYSICAL EXAM      Date: 11/28/2017  Patient Name: Kee Dominguez    History of Presenting Illness     Chief Complaint   Patient presents with   [facility] Fall     GLF on the sidewalk. Just got out ICU Sunday       History Provided By: Patient    HPI: Kee Dominguez, 56 y.o. female with PMHx significant for IDDM, HLD, panic attacks, PTSD, DKA, presents via wheelchair to the ED with cc of sudden onset 8.5/10 L lumbar back pain and 7/10 R ankle pain with swelling with associated 5/10 HA s/p falling up 1 step PTA. Pt reports that she was d/c from the hospital on 11/26/17 for DKA and notes that she has been feeling general weak and fatigued since. She was attempting to walk up a stair into her house today and after placing 1 foot on the stair, she felt weak and fell forward onto her face, causing a small, non-painful abrasion to her nose and her ankle and back pain. Of note, pt had a fall before her admission, causing some mild back pain prior to her fall today. Additionally of note, pt states that she has been SOB today, but she believes it is due to anxiety. She specifically denies increased BLE swelling from baseline, incontinence, abd pain, CP, cough, diarrhea, fever or LOC. PCP: Fred Meadows III, DO    There are no other complaints, changes, or physical findings at this time. Current Outpatient Prescriptions   Medication Sig Dispense Refill    oxyCODONE-acetaminophen (PERCOCET) 5-325 mg per tablet Take 1 Tab by mouth every four (4) hours as needed for Pain. Max Daily Amount: 6 Tabs. 20 Tab 0    acetaminophen (TYLENOL) 325 mg tablet Take 2 Tabs by mouth every six (6) hours as needed. 40 Tab 0    ALPRAZolam (XANAX) 0.5 mg tablet Take 1 Tab by mouth three (3) times daily as needed for Anxiety. Max Daily Amount: 1.5 mg. 40 Tab 0    gabapentin (NEURONTIN) 300 mg capsule Take 1 Cap by mouth three (3) times daily.  90 Cap 1    guaiFENesin (ROBITUSSIN) 100 mg/5 mL liquid Take 20 mL by mouth three (3) times daily. 473 mL 0    hydrocortisone (CORTAID) 1 % topical cream Apply  to affected area two (2) times a day. use thin layer as directed 30 g 0    magnesium oxide (MAG-OX) 400 mg tablet Take 1 Tab by mouth two (2) times a day. 60 Tab 1    venlafaxine-SR (EFFEXOR-XR) 75 mg capsule Take 1 Cap by mouth daily (with breakfast). 30 Cap 1    promethazine (PHENERGAN) 25 mg tablet Take 1 Tab by mouth every six (6) hours as needed for Nausea. 30 Tab 0    nicotine (NICODERM CQ) 21 mg/24 hr 1 Patch by TransDERmal route every twenty-four (24) hours for 30 days. 30 Patch 0    carvedilol (COREG) 12.5 mg tablet Take 1 Tab by mouth two (2) times daily (with meals). 60 Tab 1    furosemide (LASIX) 20 mg tablet Take 1 Tab by mouth daily. 30 Tab 1    potassium chloride (K-DUR, KLOR-CON) 20 mEq tablet Take 1 Tab by mouth daily. 30 Tab 1    amoxicillin-clavulanate (AUGMENTIN) 875-125 mg per tablet Take 1 Tab by mouth every twelve (12) hours for 5 days. 10 Tab 0    insulin glargine (LANTUS SOLOSTAR) 100 unit/mL (3 mL) inpn 40 Units by SubCUTAneous route daily. 15 mL 1    Insulin Needles, Disposable, (PEN NEEDLE) 29 gauge x 1/2\" ndle 1 Each by Does Not Apply route Multiple. As directed 100 Pen Needle 1    esomeprazole (NEXIUM) 40 mg capsule Take 1 Cap by mouth daily. 30 Cap 9    insulin aspart (NOVOLOG FLEXPEN) 100 unit/mL inpn 1:15 for carbs and 1:50 > 150 for correction.   Max 50 units per day--may sub humalog or apidra if preferred 15 mL 11    glucagon (GLUCAGON EMERGENCY KIT, HUMAN,) 1 mg injection Use as directed 2 Kit 11       Past History     Past Medical History:  Past Medical History:   Diagnosis Date    Achilles tendon rupture     along with torn right patellar/femoral ligament    Arthritis     rt. foot    Chronic pain     abdominal pain    Diabetes (HCC)     IDDM on insulin pump and sensor    DM type 1 (diabetes mellitus, type 1) ([facility] [address]     age 25    Endometriosis     s/p ex-lap 4x  Gastric ulcer     GERD (gastroesophageal reflux disease)     Herpes     Other and unspecified hyperlipidemia     Panic attacks     Psychiatric disorder     anxiety, panic attacks    PTSD (post-traumatic stress disorder)     Unspecified essential hypertension        Past Surgical History:  Past Surgical History:   Procedure Laterality Date    HX GYN      2 d&c's    HX GYN      laparoscopies x3    HX ORTHOPAEDIC  2002    achiles tendon repair,talus repair    HX ORTHOPAEDIC      injections x2 to right shoulder       Family History:  Family History   Problem Relation Age of Onset    Diabetes Mother      diet controlled    Diabetes Father     Heart Disease Paternal Uncle      MI in his 46s    Stroke Neg Hx        Social History:  Social History   Substance Use Topics    Smoking status: Former Smoker     Packs/day: 1.50     Years: 17.00     Quit date: 11/28/2017    Smokeless tobacco: Never Used      Comment: Trying to quit    Alcohol use No      Comment: a beer every few months       Allergies: Allergies   Allergen Reactions    Zocor [Simvastatin] Myalgia    Lisinopril Cough         Review of Systems   Review of Systems   Constitutional: Positive for fatigue. Negative for fever. HENT: Negative for congestion. Eyes: Negative for visual disturbance. Respiratory: Positive for shortness of breath. Negative for cough. Cardiovascular: Negative for chest pain and leg swelling (no increase from baseline). Gastrointestinal: Negative for abdominal pain and diarrhea. Endocrine: Negative for heat intolerance. Genitourinary: Negative. No urinary incontinence    Musculoskeletal: Positive for arthralgias (R ankle), back pain (L lumbar) and joint swelling (R ankle). Skin: Positive for wound. Negative for rash. Healing wound, right elbow   Allergic/Immunologic: Negative for immunocompromised state. Neurological: Positive for weakness (generalized) and headaches.  Negative for syncope. Hematological: Does not bruise/bleed easily. Psychiatric/Behavioral: Negative. All other systems reviewed and are negative. Physical Exam   Physical Exam   Constitutional: She is oriented to person, place, and time. She appears well-developed and well-nourished. She appears distressed (mild). HENT:   Head: Normocephalic and atraumatic. Eyes: EOM are normal. Pupils are equal, round, and reactive to light. Neck: Normal range of motion. Neck non-tender    Cardiovascular: Normal rate, regular rhythm and normal heart sounds. Pulmonary/Chest: Effort normal and breath sounds normal. No respiratory distress. Abdominal: Soft. Bowel sounds are normal. She exhibits no mass. There is tenderness (epigastric). Musculoskeletal: She exhibits edema (2+ edema BL feet and ankles). No lumbar tenderness   L lower back tenderness   R ankle ttp  Decreased ROM RLE   Neurological: She is alert and oriented to person, place, and time. Coordination normal.   Decreased sensation to R face and RLE, baseline    Skin: Skin is warm and dry. Abrasion to nose, non-tender    Psychiatric: She has a normal mood and affect. Her behavior is normal.   Nursing note and vitals reviewed.         Diagnostic Study Results     Labs -     Recent Results (from the past 12 hour(s))   GLUCOSE, POC    Collection Time: 11/28/17  3:15 PM   Result Value Ref Range    Glucose (POC) 126 (H) 65 - 100 mg/dL    Performed by Unkown     CBC WITH AUTOMATED DIFF    Collection Time: 11/28/17  6:51 PM   Result Value Ref Range    WBC 12.7 (H) 3.6 - 11.0 K/uL    RBC 3.02 (L) 3.80 - 5.20 M/uL    HGB 10.2 (L) 11.5 - 16.0 g/dL    HCT 30.7 (L) 35.0 - 47.0 %    .7 (H) 80.0 - 99.0 FL    MCH 33.8 26.0 - 34.0 PG    MCHC 33.2 30.0 - 36.5 g/dL    RDW 20.5 (H) 11.5 - 14.5 %    PLATELET 456 (H) 878 - 400 K/uL    NEUTROPHILS 60 %    BAND NEUTROPHILS 6 %    LYMPHOCYTES 24 %    MONOCYTES 6 %    EOSINOPHILS 2 %    BASOPHILS 1 %    METAMYELOCYTES 1 %    ABS. NEUTROPHILS 8.4 K/UL    ABS. LYMPHOCYTES 3.0 K/UL    ABS. MONOCYTES 0.8 K/UL    ABS. EOSINOPHILS 0.3 K/UL    ABS. BASOPHILS 0.1 K/UL    RBC COMMENTS ANISOCYTOSIS  1+        WBC COMMENTS TOXIC GRANULATION     PROTHROMBIN TIME + INR    Collection Time: 11/28/17  6:51 PM   Result Value Ref Range    INR 1.1 0.9 - 1.1      Prothrombin time 10.6 9.0 - 94.7 sec   METABOLIC PANEL, COMPREHENSIVE    Collection Time: 11/28/17  6:51 PM   Result Value Ref Range    Sodium 134 (L) 136 - 145 mmol/L    Potassium 4.4 3.5 - 5.1 mmol/L    Chloride 102 97 - 108 mmol/L    CO2 26 21 - 32 mmol/L    Anion gap 6 5 - 15 mmol/L    Glucose 85 65 - 100 mg/dL    BUN 15 6 - 20 MG/DL    Creatinine 1.34 (H) 0.55 - 1.02 MG/DL    BUN/Creatinine ratio 11 (L) 12 - 20      GFR est AA 52 (L) >60 ml/min/1.73m2    GFR est non-AA 43 (L) >60 ml/min/1.73m2    Calcium 8.5 8.5 - 10.1 MG/DL    Bilirubin, total 0.2 0.2 - 1.0 MG/DL    ALT (SGPT) 40 12 - 78 U/L    AST (SGOT) 25 15 - 37 U/L    Alk.  phosphatase 81 45 - 117 U/L    Protein, total 6.1 (L) 6.4 - 8.2 g/dL    Albumin 2.6 (L) 3.5 - 5.0 g/dL    Globulin 3.5 2.0 - 4.0 g/dL    A-G Ratio 0.7 (L) 1.1 - 2.2     MAGNESIUM    Collection Time: 11/28/17  6:51 PM   Result Value Ref Range    Magnesium 1.8 1.6 - 2.4 mg/dL   URINALYSIS W/ RFLX MICROSCOPIC    Collection Time: 11/28/17  6:51 PM   Result Value Ref Range    Color YELLOW/STRAW      Appearance CLEAR CLEAR      Specific gravity 1.009 1.003 - 1.030      pH (UA) 5.0 5.0 - 8.0      Protein NEGATIVE  NEG mg/dL    Glucose NEGATIVE  NEG mg/dL    Ketone NEGATIVE  NEG mg/dL    Bilirubin NEGATIVE  NEG      Blood NEGATIVE  NEG      Urobilinogen 0.2 0.2 - 1.0 EU/dL    Nitrites NEGATIVE  NEG      Leukocyte Esterase NEGATIVE  NEG     LIPASE    Collection Time: 11/28/17  6:51 PM   Result Value Ref Range    Lipase 70 (L) 73 - 393 U/L   CK    Collection Time: 11/28/17  6:51 PM   Result Value Ref Range     (H) 26 - 192 U/L   TROPONIN I    Collection Time: 11/28/17  6:51 PM   Result Value Ref Range    Troponin-I, Qt. <0.04 <0.05 ng/mL   EKG, 12 LEAD, INITIAL    Collection Time: 11/28/17  7:04 PM   Result Value Ref Range    Ventricular Rate 88 BPM    Atrial Rate 88 BPM    P-R Interval 162 ms    QRS Duration 84 ms    Q-T Interval 362 ms    QTC Calculation (Bezet) 438 ms    Calculated P Axis 51 degrees    Calculated R Axis -4 degrees    Calculated T Axis 59 degrees    Diagnosis       Normal sinus rhythm  Nonspecific T wave abnormality  When compared with ECG of 20-NOV-2017 12:05,  No significant change was found         Radiologic Studies -     INDICATION:  trauma ground-level fall with lower back pain     EXAM: 3 views lumbar spine. No comparisons.     FINDINGS: Alignment is normal. Disc spaces are preserved. No bony destructive  lesions or fractures. There are vascular calcifications. T-shaped IUD overlies  the pelvis     IMPRESSION  IMPRESSION:  1. No acute abnormality       CT Results  (Last 48 hours)               11/28/17 2006  CT HEAD WO CONT Final result    Impression:  IMPRESSION:       No acute intracranial abnormality       Narrative:  EXAM:  CT HEAD WO CONT       INDICATION:   trauma       Ground-level fall today       COMPARISON: November 24, 2017. CONTRAST:  None. TECHNIQUE: Unenhanced CT of the head was performed using 5 mm images. Brain and   bone windows were generated. CT dose reduction was achieved through use of a   standardized protocol tailored for this examination and automatic exposure   control for dose modulation. FINDINGS:   Ventricles and cisterns mildly prominent compatible with the overall degree of   volume loss. There is no significant white matter disease. There is no   intracranial hemorrhage, extra-axial collection, mass, mass effect or midline   shift. The basilar cisterns are open. No acute infarct is identified. The bone   windows demonstrate no abnormalities.  The visualized portions of the paranasal   sinuses and mastoid air cells are clear. Medical Decision Making   I am the first provider for this patient. I reviewed the vital signs, available nursing notes, past medical history, past surgical history, family history and social history. Vital Signs-Reviewed the patient's vital signs. Patient Vitals for the past 12 hrs:   Temp Pulse Resp BP SpO2   11/28/17 2101 97.7 °F (36.5 °C) 93 14 120/81 99 %   11/28/17 1644 - 92 18 104/77 97 %   11/28/17 1516 98 °F (36.7 °C) 100 18 116/55 98 %       EKG interpretation: (Preliminary) 1904  Rhythm: normal sinus rhythm; and regular . Rate (approx.): 88  bpm; Axis: normal; AR interval: normal; QRS interval: normal ; ST/T wave: non specific T wave abnormality; Other findings: no significant change. Written by Jaymemileah Woods, ED Scribe, as dictated by Jocelyn Mcmillan MD.    Records Reviewed: Old Medical Records    Provider Notes (Medical Decision Making):   DDx: sprain, strain, contusion, ICH, UTI, DKA, electrolyte abnormality, dehydration, arrhythmia     ED Course:   Initial assessment performed. The patients presenting problems have been discussed, and they are in agreement with the care plan formulated and outlined with them. I have encouraged them to ask questions as they arise throughout their visit. Disposition:  DISCHARGE NOTE:  9:15 PM  Pt has been reexamined. Pt has no new complaints, changes, or physical findings. Care plan outlined and precautions discussed. All available results reviewed with pt. All medications reviewed with pt. All of pts questions and concerns addressed. Pt agrees to f/u as instructed and agrees to return to ED upon further deterioration. Pt is ready to go home. Written by Rommie Shoulders ED Scribe as dictated by Jocelyn Mcmillan MD    PLAN:  1.    Discharge Medication List as of 11/28/2017  9:10 PM      START taking these medications    Details   HYDROcodone-acetaminophen (NORCO) 5-325 mg per tablet Take 1 Tab by mouth every four (4) hours as needed for Pain. Max Daily Amount: 6 Tabs., Print, Disp-20 Tab, R-0         CONTINUE these medications which have NOT CHANGED    Details   acetaminophen (TYLENOL) 325 mg tablet Take 2 Tabs by mouth every six (6) hours as needed. , Print, Disp-40 Tab, R-0      ALPRAZolam (XANAX) 0.5 mg tablet Take 1 Tab by mouth three (3) times daily as needed for Anxiety. Max Daily Amount: 1.5 mg., Print, Disp-40 Tab, R-0      gabapentin (NEURONTIN) 300 mg capsule Take 1 Cap by mouth three (3) times daily. , Print, Disp-90 Cap, R-1      guaiFENesin (ROBITUSSIN) 100 mg/5 mL liquid Take 20 mL by mouth three (3) times daily. , Print, Disp-473 mL, R-0      hydrocortisone (CORTAID) 1 % topical cream Apply  to affected area two (2) times a day. use thin layer as directed, Print, Disp-30 g, R-0      magnesium oxide (MAG-OX) 400 mg tablet Take 1 Tab by mouth two (2) times a day., Print, Disp-60 Tab, R-1      venlafaxine-SR (EFFEXOR-XR) 75 mg capsule Take 1 Cap by mouth daily (with breakfast). , Print, Disp-30 Cap, R-1      promethazine (PHENERGAN) 25 mg tablet Take 1 Tab by mouth every six (6) hours as needed for Nausea. , Print, Disp-30 Tab, R-0      nicotine (NICODERM CQ) 21 mg/24 hr 1 Patch by TransDERmal route every twenty-four (24) hours for 30 days. , Print, Disp-30 Patch, R-0      carvedilol (COREG) 12.5 mg tablet Take 1 Tab by mouth two (2) times daily (with meals). , Print, Disp-60 Tab, R-1      furosemide (LASIX) 20 mg tablet Take 1 Tab by mouth daily. , Print, Disp-30 Tab, R-1      potassium chloride (K-DUR, KLOR-CON) 20 mEq tablet Take 1 Tab by mouth daily. , Print, Disp-30 Tab, R-1      amoxicillin-clavulanate (AUGMENTIN) 875-125 mg per tablet Take 1 Tab by mouth every twelve (12) hours for 5 days. , Print, Disp-10 Tab, R-0      insulin glargine (LANTUS SOLOSTAR) 100 unit/mL (3 mL) inpn 40 Units by SubCUTAneous route daily. , Normal, Disp-15 mL, R-1      Insulin Needles, Disposable, (PEN NEEDLE) 29 gauge x 1/2\" ndle 1 Each by Does Not Apply route Multiple. As directed, Print, Disp-100 Pen Needle, R-1      esomeprazole (NEXIUM) 40 mg capsule Take 1 Cap by mouth daily. , Normal, Disp-30 Cap, R-9      insulin aspart (NOVOLOG FLEXPEN) 100 unit/mL inpn 1:15 for carbs and 1:50 > 150 for correction. Max 50 units per day--may sub humalog or apidra if preferred, Normal, Disp-15 mL, R-11      glucagon (GLUCAGON EMERGENCY KIT, HUMAN,) 1 mg injection Use as directed, Normal, Disp-2 Kit, R-11         STOP taking these medications       chlorpheniramine-HYDROcodone (TUSSIONEX) 10-8 mg/5 mL suspension Comments:   Reason for Stopping:         HYDROcodone-acetaminophen (Jamil Dolphin) 7.5-325 mg per tablet Comments:   Reason for Stoppin.   Follow-up Information     Follow up With Details Comments [address] III, DO In 2 days As needed [address] [facility]  [phone]      [facility] EMERGENCY DEPT  If symptoms worsen [address] [facility] [address]  [phone]        Return to ED if worse     Diagnosis     Clinical Impression:   1. Fall, initial encounter    2. Dehydration    3. Closed head injury, initial encounter    4. Left flank pain    5. Sprain of right ankle, unspecified ligament, initial encounter        Attestations: This note is prepared by Alma Moyer acting as scribe for MD Jacqueline Perales MD : The scribe's documentation has been prepared under my direction and personally reviewed by me in its entirety. I confirm that the note above accurately reflects all work, treatment, procedures, and medical decision making performed by me.

## 2017-11-29 NOTE — DISCHARGE INSTRUCTIONS
Ankle Sprain: Care Instructions  Your Care Instructions    An ankle sprain can happen when you twist your ankle. The ligaments that support the ankle can get stretched and torn. Often the ankle is swollen and painful. Ankle sprains may take from several weeks to several months to heal. Usually, the more pain and swelling you have, the more severe your ankle sprain is and the longer it will take to heal. You can heal faster and regain strength in your ankle with good home treatment. It is very important to give your ankle time to heal completely, so that you do not easily hurt your ankle again. Follow-up care is a key part of your treatment and safety. Be sure to make and go to all appointments, and call your doctor if you are having problems. It's also a good idea to know your test results and keep a list of the medicines you take. How can you care for yourself at home? · Prop up your foot on pillows as much as possible for the next 3 days. Try to keep your ankle above the level of your heart. This will help reduce the swelling. · Follow your doctor's directions for wearing a splint or elastic bandage. Wrapping the ankle may help reduce or prevent swelling. · Your doctor may give you a splint, a brace, an air stirrup, or another form of ankle support to protect your ankle until it is healed. Wear it as directed while your ankle is healing. Do not remove it unless your doctor tells you to. After your ankle has healed, ask your doctor whether you should wear the brace when you exercise. · Put ice or cold packs on your injured ankle for 10 to 20 minutes at a time. Try to do this every 1 to 2 hours for the next 3 days (when you are awake) or until the swelling goes down. Put a thin cloth between the ice and your skin. · You may need to use crutches until you can walk without pain. If you do use crutches, try to bear some weight on your injured ankle if you can do so without pain.  This helps the ankle heal.  · Take pain medicines exactly as directed. ¨ If the doctor gave you a prescription medicine for pain, take it as prescribed. ¨ If you are not taking a prescription pain medicine, ask your doctor if you can take an over-the-counter medicine. · If you have been given ankle exercises to do at home, do them exactly as instructed. These can promote healing and help prevent lasting weakness. When should you call for help? Call your doctor now or seek immediate medical care if:  ? · Your pain is getting worse. ? · Your swelling is getting worse. ? · Your splint feels too tight or you are unable to loosen it. ? Watch closely for changes in your health, and be sure to contact your doctor if:  ? · You are not getting better after 1 week. Where can you learn more? Go to http://mac-rufus.info/. Enter Z849 in the search box to learn more about \"Ankle Sprain: Care Instructions. \"  Current as of: March 21, 2017  Content Version: 11.4  © 4584-2569 [employer]. Care instructions adapted under license by [employer] (which disclaims liability or warranty for this information). If you have questions about a medical condition or this instruction, always ask your healthcare professional. [place] [address] any warranty or liability for your use of this information. Dehydration: Care Instructions  Your Care Instructions  Dehydration happens when your body loses too much fluid. This might happen when you do not drink enough water or you lose large amounts of fluids from your body because of diarrhea, vomiting, or sweating. Severe dehydration can be life-threatening. Water and minerals called electrolytes help put your body fluids back in balance. Learn the early signs of fluid loss, and drink more fluids to prevent dehydration. Follow-up care is a key part of your treatment and safety.  Be sure to make and go to all appointments, and call your doctor if you are having problems. It's also a good idea to know your test results and keep a list of the medicines you take. How can you care for yourself at home? · To prevent dehydration, drink plenty of fluids, enough so that your urine is light yellow or clear like water. Choose water and other caffeine-free clear liquids until you feel better. If you have kidney, heart, or liver disease and have to limit fluids, talk with your doctor before you increase the amount of fluids you drink. · If you do not feel like eating or drinking, try taking small sips of water, sports drinks, or other rehydration drinks. · Get plenty of rest.  To prevent dehydration  · Add more fluids to your diet and daily routine, unless your doctor has told you not to. · During hot weather, drink more fluids. Drink even more fluids if you exercise a lot. Stay away from drinks with alcohol or caffeine. · Watch for the symptoms of dehydration. These include:  ¨ A dry, sticky mouth. ¨ Dark yellow urine, and not much of it. ¨ Dry and sunken eyes. ¨ Feeling very tired. · Learn what problems can lead to dehydration. These include:  ¨ Diarrhea, fever, and vomiting. ¨ Any illness with a fever, such as pneumonia or the flu. ¨ Activities that cause heavy sweating, such as endurance races and heavy outdoor work in hot or humid weather. ¨ Alcohol or drug abuse or withdrawal.  ¨ Certain medicines, such as cold and allergy pills (antihistamines), diet pills (diuretics), and laxatives. ¨ Certain diseases, such as diabetes, cancer, and heart or kidney disease. When should you call for help? Call 911 anytime you think you may need emergency care. For example, call if:  ? · You passed out (lost consciousness). ?Call your doctor now or seek immediate medical care if:  ? · You are confused and cannot think clearly. ? · You are dizzy or lightheaded, or you feel like you may faint. ? · You have signs of needing more fluids.  You have sunken eyes and a dry mouth, and you pass only a little dark urine. ? · You cannot keep fluids down. ? Watch closely for changes in your health, and be sure to contact your doctor if:  ? · You are not making tears. ? · Your skin is very dry and sags slowly back into place after you pinch it. ? · Your mouth and eyes are very dry. Where can you learn more? Go to http://mac-rufus.info/. Enter X925 in the search box to learn more about \"Dehydration: Care Instructions. \"  Current as of: March 20, 2017  Content Version: 11.4  © 3196-1681 [employer]. Care instructions adapted under license by [employer] (which disclaims liability or warranty for this information). If you have questions about a medical condition or this instruction, always ask your healthcare professional. [address] any warranty or liability for your use of this information. Learning About a Closed Head Injury  What is a closed head injury? A closed head injury happens when your head gets hit hard. The strong force of the blow causes your brain to shake in your skull. This movement can cause the brain to bruise, swell, or tear. Sometimes nerves or blood vessels also get damaged. This can cause bleeding in or around the brain. A concussion is a type of closed head injury. What are the symptoms? If you have a mild concussion, you may have a mild headache or feel \"not quite right. \" These symptoms are common. They usually go away over a few days to 4 weeks. But sometimes after a concussion, you feel like you can't function as well as before the injury. And you have new symptoms. This is called postconcussive syndrome. You may:  · Find it harder to solve problems, think, concentrate, or remember. · Have headaches. · Have changes in your sleep patterns, such as not being able to sleep or sleeping all the time. · Have changes in your personality.   · Not be interested in your usual activities. · Feel angry or anxious without a clear reason. · Lose your sense of taste or smell. · Be dizzy, lightheaded, or unsteady. It may be hard to stand or walk. How is a closed head injury treated? Any person who may have a concussion needs to see a doctor. Some people have to stay in the hospital to be watched. Others can go home safely. If you go home, follow your doctor's instructions. He or she will tell you if you need someone to watch you closely for the next 24 hours or longer. Rest is the best treatment. Get plenty of sleep at night. And try to rest during the day. · Avoid activities that are physically or mentally demanding. These include housework, exercise, and schoolwork. And don't play video games, send text messages, or use the computer. You may need to change your school or work schedule to be able to avoid these activities. · Ask your doctor when it's okay to drive, ride a bike, or operate machinery. · Take an over-the-counter pain medicine, such as acetaminophen (Tylenol), ibuprofen (Advil, Motrin), or naproxen (Aleve). Be safe with medicines. Read and follow all instructions on the label. · Check with your doctor before you use any other medicines for pain. · Do not drink alcohol or use illegal drugs. They can slow recovery. They can also increase your risk of getting a second head injury. Follow-up care is a key part of your treatment and safety. Be sure to make and go to all appointments, and call your doctor if you are having problems. It's also a good idea to know your test results and keep a list of the medicines you take. Where can you learn more? Go to http://mac-rufus.info/. Enter E235 in the search box to learn more about \"Learning About a Closed Head Injury. \"  Current as of: October 14, 2016  Content Version: 11.4  © 5719-9938 [employer].  Care instructions adapted under license by [employer] (which disclaims liability or warranty for this information). If you have questions about a medical condition or this instruction, always ask your healthcare professional. [address] any warranty or liability for your use of this information. Flank Pain: Care Instructions  Your Care Instructions  Flank pain is pain on the side of the back just below the rib cage and above the waist. It can be on one or both sides. Flank pain has many possible causes, including a kidney stone, a urinary tract infection, or back strain. Flank pain may get better on its own. But don't ignore new symptoms, such as fever, nausea and vomiting, urination problems, pain that gets worse, and dizziness. These may be signs of a more serious problem. You may have to have tests or other treatment. Follow-up care is a key part of your treatment and safety. Be sure to make and go to all appointments, and call your doctor if you are having problems. It's also a good idea to know your test results and keep a list of the medicines you take. How can you care for yourself at home? · Rest until you feel better. · Take pain medicines exactly as directed. ¨ If the doctor gave you a prescription medicine for pain, take it as prescribed. ¨ If you are not taking a prescription pain medicine, ask your doctor if you can take an over-the-counter pain medicine, such as acetaminophen (Tylenol), ibuprofen (Advil, Motrin), or naproxen (Aleve). Read and follow all instructions on the label. · If your doctor prescribed antibiotics, take them as directed. Do not stop taking them just because you feel better. You need to take the full course of antibiotics. · To apply heat, put a warm water bottle, a heating pad set on low, or a warm cloth on the painful area. Do not go to sleep with a heating pad on your skin. · To prevent dehydration, drink plenty of fluids, enough so that your urine is light yellow or clear like water.  Choose water and other caffeine-free clear liquids until you feel better. If you have kidney, heart, or liver disease and have to limit fluids, talk with your doctor before you increase the amount of fluids you drink. When should you call for help? Call your doctor now or seek immediate medical care if:  ? · Your flank pain gets worse. ? · You have new symptoms, such as fever, nausea, or vomiting. ? · You have symptoms of a urinary problem. For example:  ¨ You have blood or pus in your urine. ¨ You have chills or body aches. ¨ It hurts to urinate. ¨ You have groin or belly pain. ? Watch closely for changes in your health, and be sure to contact your doctor if you do not get better as expected. Where can you learn more? Go to http://mac-rufus.info/. Enter S191 in the search box to learn more about \"Flank Pain: Care Instructions. \"  Current as of: March 20, 2017  Content Version: 11.4  © 3743-3311 [employer]. Care instructions adapted under license by [employer] (which disclaims liability or warranty for this information). If you have questions about a medical condition or this instruction, always ask your healthcare professional. [address] any warranty or liability for your use of this information.

## 2017-11-29 NOTE — ED NOTES
MD has reviewed discharge instructions with the patient. The patient verbalized understanding. Patient discharged home via wheelchair with family.

## 2017-11-29 NOTE — ED NOTES
Bedside and Verbal shift change report given to Michael Perez (oncoming nurse) by Rafael Saint  (offgoing nurse). Report included the following information SBAR and ED Summary.

## 2017-11-30 ENCOUNTER — HOME CARE VISIT (OUTPATIENT)
Dept: HOME HEALTH SERVICES | Facility: HOME HEALTH | Age: 47
End: 2017-11-30
Payer: MEDICAID

## 2017-12-01 ENCOUNTER — HOME CARE VISIT (OUTPATIENT)
Dept: HOME HEALTH SERVICES | Facility: HOME HEALTH | Age: 47
End: 2017-12-01
Payer: MEDICAID

## 2017-12-05 ENCOUNTER — TELEPHONE (OUTPATIENT)
Dept: INTERNAL MEDICINE CLINIC | Age: 47
End: 2017-12-05

## 2017-12-05 RX ORDER — HYDROCODONE POLISTIREX AND CHLORPHENIRAMINE POLISTIREX 10; 8 MG/5ML; MG/5ML
5 SUSPENSION, EXTENDED RELEASE ORAL
Qty: 120 ML | Refills: 0 | Status: CANCELLED | OUTPATIENT
Start: 2017-12-05

## 2017-12-05 RX ORDER — ACETAMINOPHEN 500 MG
500 TABLET ORAL
Qty: 60 TAB | Refills: 3 | Status: SHIPPED | OUTPATIENT
Start: 2017-12-05 | End: 2018-05-11

## 2017-12-05 RX ORDER — AMOXICILLIN AND CLAVULANATE POTASSIUM 875; 125 MG/1; MG/1
1 TABLET, FILM COATED ORAL 2 TIMES DAILY
Qty: 14 TAB | Refills: 0 | Status: SHIPPED | OUTPATIENT
Start: 2017-12-05 | End: 2017-12-12

## 2017-12-05 NOTE — TELEPHONE ENCOUNTER
Pt request for a call back from the practice. She believes that she may need a to speak with the nurse in regards to her medications. Her best contact umber is [phone].        Message received & copied from [facility]

## 2017-12-06 ENCOUNTER — HOME CARE VISIT (OUTPATIENT)
Dept: SCHEDULING | Facility: HOME HEALTH | Age: 47
End: 2017-12-06
Payer: MEDICAID

## 2017-12-06 VITALS
HEART RATE: 91 BPM | TEMPERATURE: 98.3 F | RESPIRATION RATE: 16 BRPM | DIASTOLIC BLOOD PRESSURE: 69 MMHG | OXYGEN SATURATION: 96 % | SYSTOLIC BLOOD PRESSURE: 117 MMHG

## 2017-12-06 PROCEDURE — 400013 HH SOC

## 2017-12-06 PROCEDURE — G0299 HHS/HOSPICE OF RN EA 15 MIN: HCPCS

## 2017-12-07 NOTE — TELEPHONE ENCOUNTER
[facility] is requesting a callback to verify medication and to get a verbal consent for treatment.  Best contact #: [phone]       Message received & copied from [facility]

## 2017-12-07 NOTE — TELEPHONE ENCOUNTER
Spoke with patient after 2 patient identifiers being note and advised that meds had been sent to pharmacy but mini needed an office visit to be seen. Patient expressed understanding and has no further questions at this time.

## 2017-12-08 ENCOUNTER — HOME CARE VISIT (OUTPATIENT)
Dept: SCHEDULING | Facility: HOME HEALTH | Age: 47
End: 2017-12-08
Payer: MEDICAID

## 2017-12-09 ENCOUNTER — HOSPITAL ENCOUNTER (EMERGENCY)
Age: 47
Discharge: HOME | End: 2017-12-09
Attending: FAMILY MEDICINE

## 2017-12-09 VITALS
RESPIRATION RATE: 20 BRPM | DIASTOLIC BLOOD PRESSURE: 80 MMHG | SYSTOLIC BLOOD PRESSURE: 159 MMHG | WEIGHT: 175 LBS | OXYGEN SATURATION: 96 % | BODY MASS INDEX: 29.16 KG/M2 | HEIGHT: 65 IN | TEMPERATURE: 98 F | HEART RATE: 96 BPM

## 2017-12-09 DIAGNOSIS — S39.012A BACK STRAIN, INITIAL ENCOUNTER: Primary | ICD-10-CM

## 2017-12-09 RX ORDER — METHOCARBAMOL 750 MG/1
750 TABLET, FILM COATED ORAL 4 TIMES DAILY
Qty: 20 TAB | Refills: 0 | Status: SHIPPED | OUTPATIENT
Start: 2017-12-09 | End: 2017-12-12

## 2017-12-09 NOTE — UC PROVIDER NOTE
HPI Comments: Reports she fell onto her right outstretched arm and now has pain on the right side of her upper back. The pain is worse with movement. Patient is a 56 y.o. female presenting with back pain. The history is provided by the patient. Back Pain    The current episode started yesterday. The pain is associated with a fall. The pain is present in the upper back. The quality of the pain is described as stabbing. The pain is moderate. The symptoms are aggravated by twisting. Past Medical History:   Diagnosis Date    Achilles tendon rupture     along with torn right patellar/femoral ligament    Arthritis     rt. foot    Chronic pain     abdominal pain    Diabetes (HCC)     IDDM on insulin pump and sensor    DM type 1 (diabetes mellitus, type 1) ([facility])     age 25    Endometriosis     s/p ex-lap 4x    Gastric ulcer     GERD (gastroesophageal reflux disease)     Herpes     Other and unspecified hyperlipidemia     Panic attacks     Psychiatric disorder     anxiety, panic attacks    PTSD (post-traumatic stress disorder)     Unspecified essential hypertension         Past Surgical History:   Procedure Laterality Date    HX GYN      2 d&c's    HX GYN      laparoscopies x3    HX ORTHOPAEDIC  2002    achiles tendon repair,talus repair    HX ORTHOPAEDIC      injections x2 to right shoulder         Family History   Problem Relation Age of Onset    Diabetes Mother      diet controlled    Diabetes Father     Heart Disease Paternal Uncle      MI in his 46s    Stroke Neg Hx         Social History     Social History    Marital status: SINGLE     Spouse name: N/A    Number of children: N/A    Years of education: N/A     Occupational History    Not on file.      Social History Main Topics    Smoking status: Former Smoker     Packs/day: 1.50     Years: 17.00     Quit date: 11/28/2017    Smokeless tobacco: Never Used      Comment: Trying to quit    Alcohol use No      Comment: a beer every few months    Drug use: No    Sexual activity: Yes     Partners: Male     Other Topics Concern    Not on file     Social History Narrative                ALLERGIES: Zocor [simvastatin] and Lisinopril    Review of Systems   Musculoskeletal: Positive for back pain. Vitals:    12/09/17 1133   BP: 159/80   Pulse: 96   Resp: 20   Temp: 98 °F (36.7 °C)   SpO2: 96%   Weight: 79.4 kg (175 lb)   Height: 5' 5\" (1.651 m)       Physical Exam   Constitutional: She appears well-developed and well-nourished. Musculoskeletal:        Thoracic back: She exhibits tenderness. She exhibits no bony tenderness, no swelling, no edema, no deformity, no laceration and no spasm. Back:    Diffuse tenderness in the circled area. There is no bone tenderness or abnormality of the scapula. Skin: Skin is warm and dry. Psychiatric: She has a normal mood and affect. Her behavior is normal.       MDM     Differential Diagnosis; Clinical Impression; Plan:     Muscle strain. Recommended heat.    Progress:   Patient progress:  Stable      Procedures

## 2017-12-09 NOTE — DISCHARGE INSTRUCTIONS
Back Strain in Children: Care Instructions  Your Care Instructions         Back Strain: Care Instructions  Your Care Instructions    Back strain happens when you overstretch, or pull, a muscle in your back. You may hurt your back in an accident or when you exercise or lift something. Most back pain will get better with rest and time. You can take care of yourself at home to help your back heal.  Follow-up care is a key part of your treatment and safety. Be sure to make and go to all appointments, and call your doctor if you are having problems. It's also a good idea to know your test results and keep a list of the medicines you take. How can you care for yourself at home? · Try to stay as active as you can, but stop or reduce any activity that causes pain. · Put ice or a cold pack on the sore muscle for 10 to 20 minutes at a time to stop swelling. Try this every 1 to 2 hours for 3 days (when you are awake) or until the swelling goes down. Put a thin cloth between the ice pack and your skin. · After 2 or 3 days, apply a heating pad on low or a warm cloth to your back. Some doctors suggest that you go back and forth between hot and cold treatments. · Take pain medicines exactly as directed. ¨ If the doctor gave you a prescription medicine for pain, take it as prescribed. ¨ If you are not taking a prescription pain medicine, ask your doctor if you can take an over-the-counter medicine. · Try sleeping on your side with a pillow between your legs. Or put a pillow under your knees when you lie on your back. These measures can ease pain in your lower back. · Return to your usual level of activity slowly. When should you call for help? Call 911 anytime you think you may need emergency care. For example, call if:  ? · You are unable to move a leg at all. ?Call your doctor now or seek immediate medical care if:  ? · You have new or worse symptoms in your legs, belly, or buttocks.  Symptoms may include:  ¨ Numbness or tingling. ¨ Weakness. ¨ Pain. ? · You lose bladder or bowel control. ? Watch closely for changes in your health, and be sure to contact your doctor if you are not getting better as expected. Where can you learn more? Go to http://mac-rufus.info/. Enter R306 in the search box to learn more about \"Back Strain: Care Instructions. \"  Current as of: March 21, 2017  Content Version: 11.4  © 2812-8320 [employer]. Care instructions adapted under license by [employer] (which disclaims liability or warranty for this information). If you have questions about a medical condition or this instruction, always ask your healthcare professional. [address] any warranty or liability for your use of this information. Back Strain: Care Instructions  Your Care Instructions    Back strain happens when you overstretch, or pull, a muscle in your back. You may hurt your back in an accident or when you exercise or lift something. Most back pain will get better with rest and time. You can take care of yourself at home to help your back heal.  Follow-up care is a key part of your treatment and safety. Be sure to make and go to all appointments, and call your doctor if you are having problems. It's also a good idea to know your test results and keep a list of the medicines you take. How can you care for yourself at home? · Try to stay as active as you can, but stop or reduce any activity that causes pain. · Put ice or a cold pack on the sore muscle for 10 to 20 minutes at a time to stop swelling. Try this every 1 to 2 hours for 3 days (when you are awake) or until the swelling goes down. Put a thin cloth between the ice pack and your skin. · After 2 or 3 days, apply a heating pad on low or a warm cloth to your back. Some doctors suggest that you go back and forth between hot and cold treatments.   · Take pain medicines exactly as directed. ¨ If the doctor gave you a prescription medicine for pain, take it as prescribed. ¨ If you are not taking a prescription pain medicine, ask your doctor if you can take an over-the-counter medicine. · Try sleeping on your side with a pillow between your legs. Or put a pillow under your knees when you lie on your back. These measures can ease pain in your lower back. · Return to your usual level of activity slowly. When should you call for help? Call 911 anytime you think you may need emergency care. For example, call if:  ? · You are unable to move a leg at all. ?Call your doctor now or seek immediate medical care if:  ? · You have new or worse symptoms in your legs, belly, or buttocks. Symptoms may include:  ¨ Numbness or tingling. ¨ Weakness. ¨ Pain. ? · You lose bladder or bowel control. ? Watch closely for changes in your health, and be sure to contact your doctor if you are not getting better as expected. Where can you learn more? Go to http://mac-rufus.info/. Enter M374 in the search box to learn more about \"Back Strain: Care Instructions. \"  Current as of: March 21, 2017  Content Version: 11.4  © 0964-6503 [employer]. Care instructions adapted under license by [employer] (which disclaims liability or warranty for this information). If you have questions about a medical condition or this instruction, always ask your healthcare professional. [place] [address] any warranty or liability for your use of this information. Back strain happens when your child overstretches, or pulls, a muscle in the back. Your child may hurt his or her back in an accident or when he or she exercises or lifts something. Most back pain will get better with rest and time. You can take care of your child at home to help his or her back heal.  Follow-up care is a key part of your child's treatment and safety.  Be sure to make and go to all appointments, and call your doctor if your child is having problems. It's also a good idea to know your child's test results and keep a list of the medicines your child takes. How can you care for your child at home? · Try to keep your child as active as you can, but stop or reduce any activity that causes pain. · Put ice or a cold pack on your child's sore muscle for 10 to 20 minutes at a time to stop swelling. Try this every 1 to 2 hours for 3 days (when your child is awake) or until the swelling goes down. Put a thin cloth between the ice pack and your child's skin. · After 2 or 3 days, apply a warm cloth to your child's back. Some doctors suggest that you go back and forth between hot and cold treatments. · Be safe with medicines. Give pain medicines exactly as directed. ¨ If the doctor gave your child a prescription medicine for pain, give it as prescribed. ¨ If your child is not taking a prescription pain medicine, ask your doctor if your child can take an over-the-counter medicine. · Have your child try sleeping on his or her side with a pillow between the legs. Or put a pillow under your child's knees when your child lies on his or her back. These measures can ease pain in the lower back. · Have your child return to his or her usual level of activity slowly. When should you call for help? Call 911 anytime you think your child may need emergency care. For example, call if:  ? · Your child is unable to move a leg at all. ?Call your doctor now or seek immediate medical care if:  ? · Your child has new or worse symptoms in the legs, belly, or buttocks. Symptoms may include:  ¨ Numbness or tingling. ¨ Weakness. ¨ Pain. ? · Your child loses bladder or bowel control. ? Watch closely for changes in your child's health, and be sure to contact your doctor if:  ? · Your child is not getting better as expected. Where can you learn more? Go to http://faith.info/.   Enter L511 in the search box to learn more about \"Back Strain in Children: Care Instructions. \"  Current as of: March 21, 2017  Content Version: 11.4  © 9115-5242 [employer], [employer]. Care instructions adapted under license by [employer] (which disclaims liability or warranty for this information). If you have questions about a medical condition or this instruction, always ask your healthcare professional. [place] [address] any warranty or liability for your use of this information.

## 2017-12-11 ENCOUNTER — HOSPITAL ENCOUNTER (OUTPATIENT)
Dept: NUCLEAR MEDICINE | Age: 47
Discharge: HOME | End: 2017-12-11
Attending: INTERNAL MEDICINE
Payer: MEDICAID

## 2017-12-11 ENCOUNTER — TELEPHONE (OUTPATIENT)
Dept: ENDOCRINOLOGY | Age: 47
End: 2017-12-11

## 2017-12-11 ENCOUNTER — TELEPHONE (OUTPATIENT)
Dept: INTERNAL MEDICINE CLINIC | Age: 47
End: 2017-12-11

## 2017-12-11 DIAGNOSIS — M25.571 ACUTE RIGHT ANKLE PAIN: ICD-10-CM

## 2017-12-11 DIAGNOSIS — R11.2 NAUSEA AND VOMITING, INTRACTABILITY OF VOMITING NOT SPECIFIED, UNSPECIFIED VOMITING TYPE: ICD-10-CM

## 2017-12-11 DIAGNOSIS — M25.511 ACUTE PAIN OF RIGHT SHOULDER: Primary | ICD-10-CM

## 2017-12-11 PROCEDURE — 78264 GASTRIC EMPTYING IMG STUDY: CPT

## 2017-12-11 RX ORDER — HEATING PAD
EACH MISCELLANEOUS
Qty: 1 EACH | Refills: 0 | Status: SHIPPED | OUTPATIENT
Start: 2017-12-11 | End: 2018-01-30

## 2017-12-11 NOTE — TELEPHONE ENCOUNTER
Patient left a voicemail stating that since she has been home from the hospital she has had episodes of low sugar readings ranging from the 20's to the 50's. She has falling  x3 including yesterday. Patient stated that her insulin was increased to 46 units while in the hospital but she has tapered it down to 42 units. Patient would like an appointment this week because she does not want to go back into DKA and she has been put on several new meds. Patient  feels she needs to be seen by Dr. Eva Bernard for adjustments.
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
## 2017-12-11 NOTE — TELEPHONE ENCOUNTER
See if she can come at 8:50am tomorrow. Have her decrease her lantus to 38 units today until she come tomorrow.

## 2017-12-11 NOTE — TELEPHONE ENCOUNTER
Spoke with patient after 2 patient identifiers being note and advised of referrals that we placed per Dr. Aneudy Valencia and of heating pad being sent to pharmacy. Patient expressed understanding and has no further questions at this time.

## 2017-12-12 ENCOUNTER — TELEPHONE (OUTPATIENT)
Dept: ENDOCRINOLOGY | Age: 47
End: 2017-12-12

## 2017-12-12 ENCOUNTER — OFFICE VISIT (OUTPATIENT)
Dept: ENDOCRINOLOGY | Age: 47
End: 2017-12-12

## 2017-12-12 VITALS
DIASTOLIC BLOOD PRESSURE: 83 MMHG | WEIGHT: 175.2 LBS | SYSTOLIC BLOOD PRESSURE: 151 MMHG | HEART RATE: 99 BPM | HEIGHT: 65 IN | BODY MASS INDEX: 29.19 KG/M2

## 2017-12-12 DIAGNOSIS — E78.5 HYPERLIPIDEMIA LDL GOAL <100: ICD-10-CM

## 2017-12-12 DIAGNOSIS — I10 ESSENTIAL HYPERTENSION, BENIGN: ICD-10-CM

## 2017-12-12 DIAGNOSIS — R79.89 ELEVATED LFTS: ICD-10-CM

## 2017-12-12 RX ORDER — FUROSEMIDE 40 MG/1
TABLET ORAL DAILY
COMMUNITY
End: 2017-12-12

## 2017-12-12 RX ORDER — INSULIN GLARGINE 100 [IU]/ML
30 INJECTION, SOLUTION SUBCUTANEOUS DAILY
Qty: 15 ML | Refills: 1
Start: 2017-12-12 | End: 2018-01-30 | Stop reason: SDUPTHER

## 2017-12-12 RX ORDER — FUROSEMIDE 40 MG/1
TABLET ORAL DAILY
COMMUNITY
End: 2017-12-15 | Stop reason: SDUPTHER

## 2017-12-12 RX ORDER — METOCLOPRAMIDE 5 MG/1
5 TABLET ORAL
COMMUNITY
End: 2017-12-12

## 2017-12-12 RX ORDER — PANTOPRAZOLE SODIUM 40 MG/1
40 TABLET, DELAYED RELEASE ORAL DAILY
COMMUNITY
End: 2018-02-03 | Stop reason: SDUPTHER

## 2017-12-12 RX ORDER — CARVEDILOL 12.5 MG/1
12.5 TABLET ORAL 2 TIMES DAILY
Qty: 60 TAB | Refills: 1 | COMMUNITY
Start: 2017-12-12 | End: 2018-01-30

## 2017-12-12 RX ORDER — VENLAFAXINE HYDROCHLORIDE 150 MG/1
150 CAPSULE, EXTENDED RELEASE ORAL 2 TIMES DAILY
COMMUNITY
End: 2019-05-21

## 2017-12-12 RX ORDER — INSULIN LISPRO 100 [IU]/ML
INJECTION, SOLUTION INTRAVENOUS; SUBCUTANEOUS
Qty: 1 PACKAGE | COMMUNITY
Start: 2017-12-12 | End: 2019-03-13 | Stop reason: ALTCHOICE
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
## 2017-12-12 NOTE — MR AVS SNAPSHOT
Visit Information Date & Time Provider Department Dept. Phone Encounter #  
 12/12/2017  8:50 AM Triston Ashley MD [facility] [id number] Follow-up Instructions Return for as scheduled in January. Your Appointments 12/15/2017 10:30 AM  
HOSPITAL FOLLOW-UP with DO PER [facility]) Appt Note: hospital f/u Diabetic issues-[facility] 0CP 12/04/2017 QDS  
 [facility] [address] [id number]  
[phone]  
  
   
 [facility] [address]  [address] [phone]  
  
    
 1/30/2018 12:10 PM  
Follow Up with Triston Ashley MD  
[address] [facility]) Appt Note: f/u  dm   ok per provider [phone] [address] Upcoming Health Maintenance Date Due DTaP/Tdap/Td series (1 - Tdap) 3/5/1992 HEMOGLOBIN A1C Q6M 5/21/2018 FOOT EXAM Q1 9/28/2018 MICROALBUMIN Q1 9/28/2018 LIPID PANEL Q1 9/28/2018 Pneumococcal 19-64 Highest Risk (2 of 3 - PCV13) 11/14/2018 EYE EXAM RETINAL OR DILATED Q1 11/14/2018 PAP AKA CERVICAL CYTOLOGY 10/9/2020 Allergies as of 12/12/2017  Review Complete On: 12/12/2017 By: Triston Ashley MD  
  
 Severity Noted Reaction Type Reactions Zocor [Simvastatin] Medium 11/20/2009   Intolerance Myalgia Lisinopril  05/26/2011    Cough Current Immunizations  Never Reviewed Name Date Influenza Vaccine (Quad) PF 11/14/2017 Influenza Vaccine Split 10/1/2012 Pneumococcal Polysaccharide (PPSV-23) 11/14/2017 Not reviewed this visit Vitals BP Pulse Height(growth percentile) Weight(growth percentile) BMI OB Status 151/83 99 5' 5\" (1.651 m) 175 lb 3.2 oz (79.5 kg) 29.15 kg/m2 Implant Smoking Status Current Every Day Smoker Vitals History BMI and BSA Data Body Mass Index Body Surface Area  
 29.15 kg/m 2 1.91 m 2 Preferred Pharmacy Pharmacy Name Phone Jocelyne Frye [address] [facility] [phone] Your Updated Medication List  
  
   
This list is accurate as of: 12/12/17  9:28 AM.  Always use your most recent med list.  
  
  
  
  
 acetaminophen 500 mg tablet Commonly known as:  TYLENOL Take 1 Tab by mouth every six (6) hours as needed for Pain. ALPRAZolam 0.5 mg tablet Commonly known as:  Marco Antonio Yuliya Take 1 Tab by mouth three (3) times daily as needed for Anxiety. Max Daily Amount: 1.5 mg.  
  
 carvedilol 12.5 mg tablet Commonly known as:  Guillermo Been Take 12.5 mg by mouth two (2) times a day. EFFEXOR  mg capsule Generic drug:  venlafaxine-SR Take 150 mg by mouth two (2) times a day.  
  
 gabapentin 300 mg capsule Commonly known as:  NEURONTIN Take 1 Cap by mouth three (3) times daily. glucagon 1 mg injection Commonly known as:  GLUCAGON EMERGENCY KIT (HUMAN) Use as directed  
  
 guaiFENesin 100 mg/5 mL liquid Commonly known as:  ROBITUSSIN Take 20 mL by mouth three (3) times daily. Heating Pads Pads Apply to affected area up to 5 times daily no more that 20 minutes each time HumaLOG KwikPen 100 unit/mL kwikpen Generic drug:  insulin lispro 1:15 for carbs and 1:50 > 150 for correction. Max 50 units per day  
  
 hydrocortisone 1 % topical cream  
Commonly known as:  CORTAID Apply  to affected area two (2) times a day. use thin layer as directed  
  
 insulin glargine 100 unit/mL (3 mL) Inpn Commonly known as:  LANTUS SOLOSTAR  
30 Units by SubCUTAneous route daily. Dose change 12/12/17--updated med list--did not send prescription to the pharmacy Insulin Needles (Disposable) 29 gauge x 1/2\" Ndle Commonly known as:  PEN NEEDLE  
 1 Each by Does Not Apply route Multiple. As directed * LASIX 40 mg tablet Generic drug:  furosemide Take  by mouth daily. * furosemide 40 mg tablet Commonly known as:  LASIX Take  by mouth daily. magnesium oxide 400 mg tablet Commonly known as:  MAG-OX Take 1 Tab by mouth two (2) times a day. nicotine 21 mg/24 hr  
Commonly known as:  NICODERM CQ  
1 Patch by TransDERmal route every twenty-four (24) hours for 30 days. potassium chloride 20 mEq tablet Commonly known as:  K-DUR, KLOR-CON Take 1 Tab by mouth daily. promethazine 25 mg tablet Commonly known as:  PHENERGAN Take 1 Tab by mouth every six (6) hours as needed for Nausea. PROTONIX 40 mg tablet Generic drug:  pantoprazole Take 40 mg by mouth daily. * Notice: This list has 2 medication(s) that are the same as other medications prescribed for you. Read the directions carefully, and ask your doctor or other care provider to review them with you. Follow-up Instructions Return for as scheduled in January. To-Do List   
 12/15/2017 To Be Determined Appointment with Eleanor Membreno RN at [place] [address] Patient Instructions 1) Stop the reglan as your gastric empyting study was normal.  
 
2) Increase the coreg back to 1 tab twice daily. 3) Decrease the lantus back to 30 units in the morning starting tomorrow. 4) Take humalog 1 unit for 15 grams of carbs for food. 5) For correction for high sugars over 150, take humalog as below: 
- take 1 unit for every 25 points for breakfast 
- take 1 units for every 50 points for lunch and dinner and bedtime 6) Stop the magnesium after your finish your current supply. 7) Stay off the atorvastatin (lipitor) for now. Introducing [facility]! Dear Rafiq Young: 
Thank you for requesting a [employer] account.   Our records indicate that you already have an active [employer] account. You can access your account anytime at [email or web address] Did you know that you can access your hospital and ER discharge instructions at any time in [employer]? You can also review all of your test results from your hospital stay or ER visit. Additional Information If you have questions, please visit the Frequently Asked Questions section of the [employer] website at [email or web address] Remember, [employer] is NOT to be used for urgent needs. For medical emergencies, dial 911. Now available from your iPhone and Android! Please provide this summary of care documentation to your next provider. Your primary care clinician is listed as Sandra Sanders If you have any questions after today's visit, please call [phone].

## 2017-12-12 NOTE — TELEPHONE ENCOUNTER
Patient stated that she has court Jan 30 at 1:00pm which is the same day she is schedule to see you. She would like to know if she could come at an earlier time.

## 2017-12-12 NOTE — PATIENT INSTRUCTIONS
1) Stop the reglan as your gastric empyting study was normal.     2) Increase the coreg back to 1 tab twice daily. 3) Decrease the lantus back to 30 units in the morning starting tomorrow. 4) Take humalog 1 unit for 15 grams of carbs for food. 5) For correction for high sugars over 150, take humalog as below:  - take 1 unit for every 25 points for breakfast  - take 1 units for every 50 points for lunch and dinner and bedtime    6) Stop the magnesium after your finish your current supply. 7) Stay off the atorvastatin (lipitor) for now.

## 2017-12-13 ENCOUNTER — HOME CARE VISIT (OUTPATIENT)
Dept: SCHEDULING | Facility: HOME HEALTH | Age: 47
End: 2017-12-13
Payer: MEDICAID

## 2017-12-13 VITALS
TEMPERATURE: 97.8 F | DIASTOLIC BLOOD PRESSURE: 83 MMHG | SYSTOLIC BLOOD PRESSURE: 142 MMHG | OXYGEN SATURATION: 95 % | RESPIRATION RATE: 18 BRPM | HEART RATE: 115 BPM

## 2017-12-13 PROCEDURE — G0299 HHS/HOSPICE OF RN EA 15 MIN: HCPCS

## 2017-12-13 NOTE — PROGRESS NOTES
Gastric study results reviewed with patient. Patient verbalized understanding and does not have any questions at this time.

## 2017-12-13 NOTE — TELEPHONE ENCOUNTER
Spoke to patient this morning.    She will come in on 1/30/18 at 8:50 AM.    (Appointment in [place].)

## 2017-12-15 ENCOUNTER — OFFICE VISIT (OUTPATIENT)
Dept: INTERNAL MEDICINE CLINIC | Age: 47
End: 2017-12-15

## 2017-12-15 VITALS
WEIGHT: 188 LBS | HEIGHT: 65 IN | RESPIRATION RATE: 16 BRPM | BODY MASS INDEX: 31.32 KG/M2 | SYSTOLIC BLOOD PRESSURE: 137 MMHG | DIASTOLIC BLOOD PRESSURE: 83 MMHG | TEMPERATURE: 98 F | OXYGEN SATURATION: 97 % | HEART RATE: 100 BPM

## 2017-12-15 DIAGNOSIS — R63.5 ABNORMAL WEIGHT GAIN: ICD-10-CM

## 2017-12-15 DIAGNOSIS — R60.0 BILATERAL LEG EDEMA: ICD-10-CM

## 2017-12-15 DIAGNOSIS — F41.9 ANXIETY AND DEPRESSION: Chronic | ICD-10-CM

## 2017-12-15 DIAGNOSIS — Z72.0 TOBACCO ABUSE: ICD-10-CM

## 2017-12-15 DIAGNOSIS — I10 ESSENTIAL HYPERTENSION: Primary | Chronic | ICD-10-CM

## 2017-12-15 DIAGNOSIS — Z23 ENCOUNTER FOR IMMUNIZATION: ICD-10-CM

## 2017-12-15 DIAGNOSIS — E11.21 TYPE 2 DIABETES MELLITUS WITH NEPHROPATHY (HCC): ICD-10-CM

## 2017-12-15 DIAGNOSIS — F32.A ANXIETY AND DEPRESSION: Chronic | ICD-10-CM

## 2017-12-15 RX ORDER — ATORVASTATIN CALCIUM 40 MG/1
40 TABLET, FILM COATED ORAL DAILY
Qty: 30 TAB | Refills: 11 | Status: SHIPPED | OUTPATIENT
Start: 2017-12-15 | End: 2018-09-26 | Stop reason: SDUPTHER

## 2017-12-15 RX ORDER — FUROSEMIDE 40 MG/1
40 TABLET ORAL DAILY
Qty: 30 TAB | Refills: 11 | Status: SHIPPED | OUTPATIENT
Start: 2017-12-15 | End: 2018-02-19 | Stop reason: SDUPTHER

## 2017-12-15 NOTE — PROGRESS NOTES
Reviewed record in preparation for visit and have obtained necessary documentation. Identified pt with two pt identifiers(name and ). Chief Complaint   Patient presents with   [facility] Follow Up       Health Maintenance Due   Topic Date Due    DTaP/Tdap/Td series (1 - Tdap) 1992       Ms. Josie Saldaña has a reminder for a \"due or due soon\" health maintenance. I have asked that she discuss health maintenance topic(s) due with Her  primary care provider. Coordination of Care Questionnaire:  :     1) Have you been to an emergency room, urgent care clinic since your last visit? yes   Hospitalized since your last visit? no             2) Have you seen or consulted any other health care providers outside of [address] [place] [address] since your last visit? no  (Include any pap smears or colon screenings in this section.)    3) Do you have an Advance Directive on file? no    4) Are you interested in receiving information on Advance Directives? NO    Patient is accompanied by self I have received verbal consent from Cheyanne Johnson to discuss any/all medical information while they are present in the room.

## 2017-12-15 NOTE — PATIENT INSTRUCTIONS
Leg and Ankle Edema: Care Instructions  Your Care Instructions  Swelling in the legs, ankles, and feet is called edema. It is common after you sit or stand for a while. Long plane flights or car rides often cause swelling in the legs and feet. You may also have swelling if you have to stand for long periods of time at your job. Problems with the veins in the legs (varicose veins) and changes in hormones can also cause swelling. Sometimes the swelling in the ankles and feet is caused by a more serious problem, such as heart failure, infection, blood clots, or liver or kidney disease. Follow-up care is a key part of your treatment and safety. Be sure to make and go to all appointments, and call your doctor if you are having problems. It's also a good idea to know your test results and keep a list of the medicines you take. How can you care for yourself at home? · If your doctor gave you medicine, take it as prescribed. Call your doctor if you think you are having a problem with your medicine. · Whenever you are resting, raise your legs up. Try to keep the swollen area higher than the level of your heart. · Take breaks from standing or sitting in one position. ¨ Walk around to increase the blood flow in your lower legs. ¨ Move your feet and ankles often while you stand, or tighten and relax your leg muscles. · Wear support stockings. Put them on in the morning, before swelling gets worse. · Eat a balanced diet. Lose weight if you need to. · Limit the amount of salt (sodium) in your diet. Salt holds fluid in the body and may increase swelling. When should you call for help? Call 911 anytime you think you may need emergency care. For example, call if:  ? · You have symptoms of a blood clot in your lung (called a pulmonary embolism). These may include:  ¨ Sudden chest pain. ¨ Trouble breathing. ¨ Coughing up blood.    ?Call your doctor now or seek immediate medical care if:  ? · You have signs of a blood clot, such as:  ¨ Pain in your calf, back of the knee, thigh, or groin. ¨ Redness and swelling in your leg or groin. ? · You have symptoms of infection, such as:  ¨ Increased pain, swelling, warmth, or redness. ¨ Red streaks or pus. ¨ A fever. ? Watch closely for changes in your health, and be sure to contact your doctor if:  ? · Your swelling is getting worse. ? · You have new or worsening pain in your legs. ? · You do not get better as expected. Where can you learn more? Go to http://mac-rufus.info/. Enter U653 in the search box to learn more about \"Leg and Ankle Edema: Care Instructions. \"  Current as of: March 20, 2017  Content Version: 11.4  © 7004-8659 better.. Care instructions adapted under license by [employer] (which disclaims liability or warranty for this information). If you have questions about a medical condition or this instruction, always ask your healthcare professional. [place] [address] any warranty or liability for your use of this information. Increase lasix to 60 mg every morning. Resume your lipitor each day as well.

## 2017-12-15 NOTE — MR AVS SNAPSHOT
Visit Information Date & Time Provider Department Dept. Phone Encounter #  
 12/15/2017 10:30 AM Ary Hightower, [address] [id number] Follow-up Instructions Return in about 4 weeks (around 1/12/2018). Your Appointments 1/30/2018  8:50 AM  
Follow Up with MD Baljit [facility]) [address] [phone] [address] [place] [address] Upcoming Health Maintenance Date Due DTaP/Tdap/Td series (1 - Tdap) 3/5/1992 HEMOGLOBIN A1C Q6M 5/21/2018 FOOT EXAM Q1 9/28/2018 MICROALBUMIN Q1 9/28/2018 LIPID PANEL Q1 9/28/2018 Pneumococcal 19-64 Highest Risk (2 of 3 - PCV13) 11/14/2018 EYE EXAM RETINAL OR DILATED Q1 11/14/2018 PAP AKA CERVICAL CYTOLOGY 10/9/2020 Allergies as of 12/15/2017  Review Complete On: 12/15/2017 By: Sotero Braden III, DO Severity Noted Reaction Type Reactions Zocor [Simvastatin] Medium 11/20/2009   Intolerance Myalgia Lisinopril  05/26/2011    Cough Current Immunizations  Never Reviewed Name Date Influenza Vaccine (Quad) PF 11/14/2017 Influenza Vaccine Split 10/1/2012 Pneumococcal Polysaccharide (PPSV-23) 11/14/2017 Not reviewed this visit You Were Diagnosed With   
  
 Codes Comments Essential hypertension    -  Primary ICD-10-CM: I10 
ICD-9-CM: 401.9 Tobacco abuse     ICD-10-CM: Z72.0 ICD-9-CM: 305.1 Abnormal weight gain     ICD-10-CM: R63.5 ICD-9-CM: 783.1 Type 2 diabetes mellitus with nephropathy (HCC)     ICD-10-CM: E11.21 
ICD-9-CM: 250.40, 583.81 Anxiety and depression     ICD-10-CM: F41.8 ICD-9-CM: 300.00, 311 Uncontrolled type I diabetes mellitus with neuropathy ([facility] [address]     ICD-10-CM: E10.40, E10.65 ICD-9-CM: 250.63, 357.2 Bilateral leg edema     ICD-10-CM: R60.0 ICD-9-CM: 646. 3 Vitals BP Pulse Temp Resp Height(growth percentile) Weight(growth percentile) 137/83 (BP 1 Location: Left arm, BP Patient Position: Sitting) 100 98 °F (36.7 °C) (Oral) 16 5' 5\" (1.651 m) 188 lb (85.3 kg) SpO2 BMI OB Status Smoking Status 97% 31.28 kg/m2 Implant Current Every Day Smoker Vitals History BMI and BSA Data Body Mass Index Body Surface Area  
 31.28 kg/m 2 1.98 m 2 Preferred Pharmacy Pharmacy Name Phone [facility] [address] [address], [address] [place] [address] [phone] Your Updated Medication List  
  
   
This list is accurate as of: 12/15/17 11:33 AM.  Always use your most recent med list.  
  
  
  
  
 acetaminophen 500 mg tablet Commonly known as:  TYLENOL Take 1 Tab by mouth every six (6) hours as needed for Pain. ALPRAZolam 0.5 mg tablet Commonly known as:  Marjan Sia Take 1 Tab by mouth three (3) times daily as needed for Anxiety. Max Daily Amount: 1.5 mg.  
  
 atorvastatin 40 mg tablet Commonly known as:  LIPITOR Take 1 Tab by mouth daily. carvedilol 12.5 mg tablet Commonly known as:  Camara Servant Take 12.5 mg by mouth two (2) times a day. EFFEXOR  mg capsule Generic drug:  venlafaxine-SR Take 150 mg by mouth two (2) times a day. furosemide 40 mg tablet Commonly known as:  LASIX Take 1 Tab by mouth daily. gabapentin 300 mg capsule Commonly known as:  NEURONTIN Take 1 Cap by mouth three (3) times daily. glucagon 1 mg injection Commonly known as:  GLUCAGON EMERGENCY KIT (HUMAN) Use as directed  
  
 guaiFENesin 100 mg/5 mL liquid Commonly known as:  ROBITUSSIN Take 20 mL by mouth three (3) times daily. Heating Pads Pads Apply to affected area up to 5 times daily no more that 20 minutes each time HumaLOG KwikPen 100 unit/mL kwikpen Generic drug:  insulin lispro 1:15 for carbs and 1:50 > 150 for correction. Max 50 units per day hydrocortisone 1 % topical cream  
Commonly known as:  CORTAID Apply  to affected area two (2) times a day. use thin layer as directed  
  
 insulin glargine 100 unit/mL (3 mL) Inpn Commonly known as:  LANTUS SOLOSTAR  
30 Units by SubCUTAneous route daily. Dose change 12/12/17--updated med list--did not send prescription to the pharmacy Insulin Needles (Disposable) 29 gauge x 1/2\" Ndle Commonly known as:  PEN NEEDLE  
1 Each by Does Not Apply route Multiple. As directed  
  
 magnesium oxide 400 mg tablet Commonly known as:  MAG-OX Take 1 Tab by mouth two (2) times a day. nicotine 21 mg/24 hr  
Commonly known as:  NICODERM CQ  
1 Patch by TransDERmal route every twenty-four (24) hours for 30 days. potassium chloride 20 mEq tablet Commonly known as:  K-DUR, KLOR-CON Take 1 Tab by mouth daily. promethazine 25 mg tablet Commonly known as:  PHENERGAN Take 1 Tab by mouth every six (6) hours as needed for Nausea. PROTONIX 40 mg tablet Generic drug:  pantoprazole Take 40 mg by mouth daily. Prescriptions Sent to Pharmacy Refills  
 atorvastatin (LIPITOR) 40 mg tablet 11 Sig: Take 1 Tab by mouth daily. Class: Normal  
 Pharmacy: [address] Ph #: [phone] Route: Oral  
 furosemide (LASIX) 40 mg tablet 11 Sig: Take 1 Tab by mouth daily. Class: Normal  
 Pharmacy: [address] Ph #: [phone] Route: Oral  
  
Follow-up Instructions Return in about 4 weeks (around 1/12/2018). To-Do List   
 12/16/2017 10:00 AM  
  Appointment with Kamala Momin RN at [facility] 39 Patient Instructions Leg and Ankle Edema: Care Instructions Your Care Instructions Swelling in the legs, ankles, and feet is called edema.  It is common after you sit or stand for a while. Long plane flights or car rides often cause swelling in the legs and feet. You may also have swelling if you have to stand for long periods of time at your job. Problems with the veins in the legs (varicose veins) and changes in hormones can also cause swelling. Sometimes the swelling in the ankles and feet is caused by a more serious problem, such as heart failure, infection, blood clots, or liver or kidney disease. Follow-up care is a key part of your treatment and safety. Be sure to make and go to all appointments, and call your doctor if you are having problems. It's also a good idea to know your test results and keep a list of the medicines you take. How can you care for yourself at home? · If your doctor gave you medicine, take it as prescribed. Call your doctor if you think you are having a problem with your medicine. · Whenever you are resting, raise your legs up. Try to keep the swollen area higher than the level of your heart. · Take breaks from standing or sitting in one position. ¨ Walk around to increase the blood flow in your lower legs. ¨ Move your feet and ankles often while you stand, or tighten and relax your leg muscles. · Wear support stockings. Put them on in the morning, before swelling gets worse. · Eat a balanced diet. Lose weight if you need to. · Limit the amount of salt (sodium) in your diet. Salt holds fluid in the body and may increase swelling. When should you call for help? Call 911 anytime you think you may need emergency care. For example, call if: 
? · You have symptoms of a blood clot in your lung (called a pulmonary embolism). These may include: 
¨ Sudden chest pain. ¨ Trouble breathing. ¨ Coughing up blood. ?Call your doctor now or seek immediate medical care if: 
? · You have signs of a blood clot, such as: 
¨ Pain in your calf, back of the knee, thigh, or groin. ¨ Redness and swelling in your leg or groin. ? · You have symptoms of infection, such as: 
¨ Increased pain, swelling, warmth, or redness. ¨ Red streaks or pus. ¨ A fever. ? Watch closely for changes in your health, and be sure to contact your doctor if: 
? · Your swelling is getting worse. ? · You have new or worsening pain in your legs. ? · You do not get better as expected. Where can you learn more? Go to http://mac-rufus.info/. Enter R095 in the search box to learn more about \"Leg and Ankle Edema: Care Instructions. \" Current as of: March 20, 2017 Content Version: 11.4 © 3436-2572 [employer]. Care instructions adapted under license by [employer] (which disclaims liability or warranty for this information). If you have questions about a medical condition or this instruction, always ask your healthcare professional. [address] any warranty or liability for your use of this information. Increase lasix to 60 mg every morning. Resume your lipitor each day as well. Introducing hospitals & HEALTH SERVICES! Dear Erick Public: 
Thank you for requesting a Pictorious account. Our records indicate that you already have an active Pictorious account. You can access your account anytime at [email or web address] Did you know that you can access your hospital and ER discharge instructions at any time in Pictorious? You can also review all of your test results from your hospital stay or ER visit. Additional Information If you have questions, please visit the Frequently Asked Questions section of the Pictorious website at [email or web address] Remember, Pictorious is NOT to be used for urgent needs. For medical emergencies, dial 911. Now available from your iPhone and Android! Please provide this summary of care documentation to your next provider. Your primary care clinician is listed as Pam Yang  If you have any questions after today's visit, please call [phone].
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
## 2017-12-16 ENCOUNTER — HOME CARE VISIT (OUTPATIENT)
Dept: SCHEDULING | Facility: HOME HEALTH | Age: 47
End: 2017-12-16
Payer: MEDICAID

## 2017-12-18 ENCOUNTER — TELEPHONE (OUTPATIENT)
Dept: INTERNAL MEDICINE CLINIC | Age: 47
End: 2017-12-18

## 2017-12-18 NOTE — TELEPHONE ENCOUNTER
Spoke with patient after 2 patient identifiers being note and advised per Dr. Milton Espinosa to take her xanax for MRI. Patient expressed understanding and has no further questions at this time.

## 2017-12-18 NOTE — TELEPHONE ENCOUNTER
Pt stated she has a MRI on 12/22 and she also stated she is claustrophobic and anxiety.  Pt would like one \"Ativan\" prescribed if possible.  Pt best contact [phone].        Message received & copied from [facility]

## 2017-12-19 ENCOUNTER — TELEPHONE (OUTPATIENT)
Dept: INTERNAL MEDICINE CLINIC | Age: 47
End: 2017-12-19

## 2017-12-19 NOTE — TELEPHONE ENCOUNTER
----- Message from Kasia Vega sent at 12/19/2017  9:58 AM EST -----  Regarding: /telephone  Pt is requesting a call back from Tabitha Whitley in regards to her RX. Best contact [phone].         Message copied/pasted from [facility]

## 2017-12-27 ENCOUNTER — TELEPHONE (OUTPATIENT)
Dept: INTERNAL MEDICINE CLINIC | Age: 47
End: 2017-12-27

## 2017-12-27 NOTE — TELEPHONE ENCOUNTER
Spoke with Rubi Vega and advised and gave verbal [facility]

## 2017-12-27 NOTE — TELEPHONE ENCOUNTER
Margi//[facility] needs a call back to get an Order to [facility] for patient. Please call.  Thank you

## 2018-01-02 ENCOUNTER — HOME CARE VISIT (OUTPATIENT)
Dept: HOME HEALTH SERVICES | Facility: HOME HEALTH | Age: 48
End: 2018-01-02
Payer: MEDICAID

## 2018-01-03 ENCOUNTER — HOME CARE VISIT (OUTPATIENT)
Dept: HOME HEALTH SERVICES | Facility: HOME HEALTH | Age: 48
End: 2018-01-03
Payer: MEDICAID

## 2018-01-10 RX ORDER — LANCETS 33 GAUGE
EACH MISCELLANEOUS
Qty: 250 LANCET | Refills: 11 | Status: SHIPPED | OUTPATIENT
Start: 2018-01-10 | End: 2019-05-21

## 2018-01-10 NOTE — TELEPHONE ENCOUNTER
Patient called to say that Medicaid will now cover her Lancets, so she would like you to contact [facility] for her please. She can be reached at:   [phone].       Jasper [phone]  Aaron Miles

## 2018-01-12 ENCOUNTER — TELEPHONE (OUTPATIENT)
Dept: INTERNAL MEDICINE CLINIC | Age: 48
End: 2018-01-12
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
## 2018-01-18 ENCOUNTER — TELEPHONE (OUTPATIENT)
Dept: ENDOCRINOLOGY | Age: 48
End: 2018-01-18

## 2018-01-18 RX ORDER — ARIPIPRAZOLE 5 MG/1
10 TABLET ORAL DAILY
COMMUNITY
End: 2018-05-11 | Stop reason: DRUGHIGH

## 2018-01-18 RX ORDER — CARVEDILOL 25 MG/1
25 TABLET ORAL 2 TIMES DAILY WITH MEALS
COMMUNITY
End: 2018-02-19 | Stop reason: SDUPTHER

## 2018-01-18 NOTE — TELEPHONE ENCOUNTER
Patient called to give Dr. Manjula Barry an update. She stated that she does not have any protein in her urine , her carvedilol was increased to 25 mg bid and she was put on Abilify 5 mg daily. Patient stated she will bring a copy of her labs to her appt on Jan 30, 2018.

## 2018-01-25 ENCOUNTER — TELEPHONE (OUTPATIENT)
Dept: ENDOCRINOLOGY | Age: 48
End: 2018-01-25

## 2018-01-25 NOTE — TELEPHONE ENCOUNTER
She does not need to do anything different based on one elevated blood sugar and did the right thing to bring her sugar down. She should continue to drink plenty of water and keep taking her insulin as directed and bring her readings to her visit next week.

## 2018-01-25 NOTE — TELEPHONE ENCOUNTER
Patient stated that on yesterday she felt great and her sugar with her sugar being 90 and 120. She stated that she slept a lot yesterday but she still felt good. This morning fasting her sugar ws 92 and she ate a yogurt at 10 am which consisted of 5 gm of carbs. Patient stated that during the course of the day her mouth felt dry so she checked her sugar and it was 432. Patient took 6 units of humalog and currently at 1:30 pm her sugar is 189. Patient would like to know what she should do and if she should have her blood drawn.   She stated she does not want to end of back in the hospital.

## 2018-01-26 ENCOUNTER — TELEPHONE (OUTPATIENT)
Dept: ENDOCRINOLOGY | Age: 48
End: 2018-01-26

## 2018-01-26 RX ORDER — PEN NEEDLE, DIABETIC 29 G X1/2"
1 NEEDLE, DISPOSABLE MISCELLANEOUS
Qty: 100 PEN NEEDLE | Refills: 0 | Status: SHIPPED | OUTPATIENT
Start: 2018-01-26 | End: 2018-03-26 | Stop reason: ALTCHOICE

## 2018-01-26 NOTE — TELEPHONE ENCOUNTER
Patient is requesting a call back. She stated that she knows the reason why her sugars were in the 500s. She can be reached at [phone].

## 2018-01-30 ENCOUNTER — TELEPHONE (OUTPATIENT)
Dept: ENDOCRINOLOGY | Age: 48
End: 2018-01-30

## 2018-01-30 ENCOUNTER — OFFICE VISIT (OUTPATIENT)
Dept: ENDOCRINOLOGY | Age: 48
End: 2018-01-30

## 2018-01-30 VITALS
WEIGHT: 188.4 LBS | DIASTOLIC BLOOD PRESSURE: 78 MMHG | HEART RATE: 103 BPM | SYSTOLIC BLOOD PRESSURE: 138 MMHG | HEIGHT: 65 IN | BODY MASS INDEX: 31.39 KG/M2

## 2018-01-30 DIAGNOSIS — R79.89 ELEVATED LFTS: ICD-10-CM

## 2018-01-30 DIAGNOSIS — E78.5 HYPERLIPIDEMIA LDL GOAL <100: ICD-10-CM

## 2018-01-30 DIAGNOSIS — I10 ESSENTIAL HYPERTENSION: Chronic | ICD-10-CM

## 2018-01-30 RX ORDER — LANOLIN ALCOHOL/MO/W.PET/CERES
800 CREAM (GRAM) TOPICAL 2 TIMES DAILY
COMMUNITY
Start: 2018-01-30 | End: 2019-02-11

## 2018-01-30 RX ORDER — INSULIN GLARGINE 100 [IU]/ML
32 INJECTION, SOLUTION SUBCUTANEOUS DAILY
Qty: 15 ML | Refills: 1
Start: 2018-01-30 | End: 2018-02-19 | Stop reason: SDUPTHER

## 2018-01-30 NOTE — TELEPHONE ENCOUNTER
Patient was just seen in the office today. She is due in court at 1:00pm and is  calling to obtain the \"date that she was taken out of work due to her medical condition. \"     Patient can be reached at: [phone]
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
## 2018-01-30 NOTE — TELEPHONE ENCOUNTER
----- Message from Yuliya Lobato sent at 1/30/2018  3:47 PM EST -----  Regarding: Anna Collado called to let you know that she was put on Disability this afternoon at her court hearing. She just wanted to thank you for all you've done for her all these years. JACQUELINEI. ....... Lizette Hinton
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
## 2018-01-30 NOTE — PATIENT INSTRUCTIONS
1) Stay on lantus 32 units daily for now but if you have another overnight low, I would cut back to 30 units daily. 2) Be sure to take coreg 25 mg twice daily to keep your blood pressure and pulse down. 3) Please go to your local [facility] 3-4 days before your next appointment to have your labs drawn. The order is already in the computer and ask to have your labs drawn under Dr. Maynor Sequeira name.     4) Take your Humalog for correction 1 unit for every 50 points over 150 all day and night   Blood sugar           150-200  1 units      201-250  2 units     251-300  3 units      301-350  4 units      351-400  5 units  401-450  6 units  451-500  7 units  501-550  8 units  Over 550  9 units

## 2018-01-31 NOTE — TELEPHONE ENCOUNTER
From: Tala Cornejo  To: Marjorie Morel MD  Sent: 1/31/2018 9:40 AM EST  Subject: Medication Renewal Request    Original authorizing provider: MD Tala Morales would like a refill of the following medications:  glucagon (GLUCAGON EMERGENCY KIT, HUMAN,) 1 mg injection Marjorie Morel MD]    [facility]: [facility] [address] [place] [address]    Comment:  Arthur HERNANDEZ Please call in protonic 40mg

## 2018-02-01 RX ORDER — PANTOPRAZOLE SODIUM 40 MG/1
40 TABLET, DELAYED RELEASE ORAL DAILY
OUTPATIENT
Start: 2018-02-01

## 2018-02-03 RX ORDER — PANTOPRAZOLE SODIUM 40 MG/1
40 TABLET, DELAYED RELEASE ORAL DAILY
Qty: 30 TAB | Refills: 0 | Status: SHIPPED | OUTPATIENT
Start: 2018-02-03 | End: 2018-02-19 | Stop reason: SDUPTHER

## 2018-02-03 NOTE — TELEPHONE ENCOUNTER
Pt e-mailed me that Dr. Mahendra Isaac would not refill her protonix as she has missed her last 3 appointments. I wrote to her that I would fill this one time for 30 days but she needs to keep her appt with Dr. Mahendra Isaac so he can refill this going froward.

## 2018-02-06 ENCOUNTER — OFFICE VISIT (OUTPATIENT)
Dept: INTERNAL MEDICINE CLINIC | Age: 48
End: 2018-02-06

## 2018-02-06 ENCOUNTER — TELEPHONE (OUTPATIENT)
Dept: INTERNAL MEDICINE CLINIC | Age: 48
End: 2018-02-06

## 2018-02-06 VITALS
RESPIRATION RATE: 16 BRPM | OXYGEN SATURATION: 96 % | BODY MASS INDEX: 32.49 KG/M2 | WEIGHT: 195 LBS | DIASTOLIC BLOOD PRESSURE: 75 MMHG | TEMPERATURE: 98.3 F | HEIGHT: 65 IN | SYSTOLIC BLOOD PRESSURE: 122 MMHG | HEART RATE: 94 BPM

## 2018-02-06 DIAGNOSIS — R05.9 COUGH: Primary | ICD-10-CM

## 2018-02-06 DIAGNOSIS — R06.2 WHEEZE: ICD-10-CM

## 2018-02-06 RX ORDER — ALBUTEROL SULFATE 90 UG/1
1 AEROSOL, METERED RESPIRATORY (INHALATION)
Qty: 1 INHALER | Refills: 1 | Status: SHIPPED | OUTPATIENT
Start: 2018-02-06 | End: 2018-07-06 | Stop reason: SDUPTHER

## 2018-02-06 RX ORDER — CODEINE PHOSPHATE AND GUAIFENESIN 10; 100 MG/5ML; MG/5ML
5 SOLUTION ORAL
Qty: 120 ML | Refills: 0 | Status: SHIPPED | OUTPATIENT
Start: 2018-02-06 | End: 2018-02-19 | Stop reason: ALTCHOICE

## 2018-02-06 RX ORDER — AMOXICILLIN AND CLAVULANATE POTASSIUM 875; 125 MG/1; MG/1
1 TABLET, FILM COATED ORAL EVERY 12 HOURS
Qty: 10 TAB | Refills: 0 | Status: SHIPPED | OUTPATIENT
Start: 2018-02-06 | End: 2018-02-11

## 2018-02-06 NOTE — PROGRESS NOTES
SUBJECTIVE  Ms. Reza Wilson presents today acutely for     Chief Complaint   Patient presents with    URI     pt here today c/o coughing up mucouse (yellow/green), nasal congestion, and rattle in chest; pt states that she cough so much that it causing back pain        Started 6 days ago, with vomiting and diarrhea--frequent watery stools. Now having cough, severe, bringing up green and yellow sputum. Back is hurting from coughing. Has DM and \"my sugar has been running crazy. \"  She has seen it \"320 and above. \"    OBJECTIVE  Visit Vitals    /75 (BP 1 Location: Left arm, BP Patient Position: Sitting)    Pulse 94    Temp 98.3 °F (36.8 °C) (Oral)    Resp 16    Ht 5' 5\" (1.651 m)    Wt 195 lb (88.5 kg)    SpO2 96%    BMI 32.45 kg/m2     Gen: Pleasant, tired appearing 56 y.o.  female in NAD.   HEENT: PERRLA. EOMI. OP moist and pink.  Neck: Supple.  No LAD.  HEART: RRR, No M/G/R.   LUNGS: Some wheezing.   ABDOMEN: S, NT, ND, BS+.   EXTREMITIES: Warm. No C/C/E. SKIN: Warm. Dry. No rashes or other lesions noted. ASSESSMENT   Cough      PLAN  Orders Placed This Encounter    XR CHEST PA LAT     Standing Status:   Future     Standing Expiration Date:   3/6/2019     Order Specific Question:   Reason for Exam     Answer:   cough--rule out pneumonia     Order Specific Question:   Is Patient Pregnant? Answer:   No    amoxicillin-clavulanate (AUGMENTIN) 875-125 mg per tablet     Sig: Take 1 Tab by mouth every twelve (12) hours for 5 days. Dispense:  10 Tab     Refill:  0    guaiFENesin-codeine (ROBITUSSIN AC) 100-10 mg/5 mL solution     Sig: Take 5 mL by mouth three (3) times daily as needed for Cough. Max Daily Amount: 15 mL. Dispense:  120 mL     Refill:  0    guaiFENesin-dextromethorphan SR (MUCINEX DM) 600-30 mg per tablet     Sig: Take 1 Tab by mouth two (2) times a day.      Dispense:  60 Tab     Refill:  0    albuterol (PROVENTIL HFA, VENTOLIN HFA, PROAIR HFA) 90 mcg/actuation inhaler     Sig: Take 1 Puff by inhalation every four (4) hours as needed for Wheezing. Dispense:  1 Inhaler     Refill:  1         I have reviewed with the patient details of the assessment and plan and all questions were answered. Relevant patient education was performed. Follow-up Disposition:  Return if symptoms worsen or fail to improve.
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
## 2018-02-07 NOTE — TELEPHONE ENCOUNTER
Left detailed VM stating the patient and let the patient know that her test result(s) is/are normal per Dr. Mahesh Diaz

## 2018-02-07 NOTE — TELEPHONE ENCOUNTER
Pt returning your call.  Please call at [phone]. Youngrodo Deepikabeny has a 240pm dr appt and will not be available but asks that you leave a detailed message on her VM.        Message received & copied from [facility]

## 2018-02-16 NOTE — PROGRESS NOTES
Per patient, has not scheduled an appointment due to health issues. Patient given contact information again to schedule an appointment with Dr. Jong Kearney.

## 2018-02-19 ENCOUNTER — OFFICE VISIT (OUTPATIENT)
Dept: INTERNAL MEDICINE CLINIC | Age: 48
End: 2018-02-19

## 2018-02-19 ENCOUNTER — TELEPHONE (OUTPATIENT)
Dept: ENDOCRINOLOGY | Age: 48
End: 2018-02-19

## 2018-02-19 VITALS
TEMPERATURE: 98 F | WEIGHT: 197 LBS | HEIGHT: 65 IN | OXYGEN SATURATION: 98 % | RESPIRATION RATE: 16 BRPM | SYSTOLIC BLOOD PRESSURE: 113 MMHG | BODY MASS INDEX: 32.82 KG/M2 | DIASTOLIC BLOOD PRESSURE: 72 MMHG | HEART RATE: 94 BPM

## 2018-02-19 DIAGNOSIS — F32.A ANXIETY AND DEPRESSION: Chronic | ICD-10-CM

## 2018-02-19 DIAGNOSIS — F41.9 ANXIETY AND DEPRESSION: Chronic | ICD-10-CM

## 2018-02-19 DIAGNOSIS — Z72.0 TOBACCO ABUSE: Primary | ICD-10-CM

## 2018-02-19 DIAGNOSIS — R05.9 COUGH: ICD-10-CM

## 2018-02-19 DIAGNOSIS — J40 BRONCHITIS: ICD-10-CM

## 2018-02-19 DIAGNOSIS — I10 ESSENTIAL HYPERTENSION: Chronic | ICD-10-CM

## 2018-02-19 PROBLEM — E11.21 TYPE 2 DIABETES MELLITUS WITH NEPHROPATHY (HCC): Status: RESOLVED | Noted: 2017-12-15 | Resolved: 2018-02-19

## 2018-02-19 RX ORDER — POTASSIUM CHLORIDE 20 MEQ/1
20 TABLET, EXTENDED RELEASE ORAL DAILY
COMMUNITY
Start: 2018-02-19 | End: 2018-10-30

## 2018-02-19 RX ORDER — CARVEDILOL 25 MG/1
12.5 TABLET ORAL 2 TIMES DAILY WITH MEALS
COMMUNITY
Start: 2018-02-19 | End: 2018-07-24

## 2018-02-19 RX ORDER — FUROSEMIDE 40 MG/1
TABLET ORAL
COMMUNITY
Start: 2018-02-19 | End: 2018-08-12 | Stop reason: DRUGHIGH

## 2018-02-19 RX ORDER — METOLAZONE 2.5 MG/1
2.5 TABLET ORAL DAILY
COMMUNITY
Start: 2018-02-19 | End: 2018-08-12

## 2018-02-19 RX ORDER — PANTOPRAZOLE SODIUM 40 MG/1
40 TABLET, DELAYED RELEASE ORAL DAILY
Qty: 30 TAB | Refills: 9 | Status: SHIPPED | OUTPATIENT
Start: 2018-02-19 | End: 2018-04-24

## 2018-02-19 RX ORDER — INSULIN GLARGINE 100 [IU]/ML
34 INJECTION, SOLUTION SUBCUTANEOUS DAILY
Qty: 15 ML | Refills: 1
Start: 2018-02-19 | End: 2018-04-24 | Stop reason: SDUPTHER

## 2018-02-19 RX ORDER — VARENICLINE TARTRATE 25 MG
KIT ORAL
Qty: 1 DOSE PACK | Refills: 0 | Status: SHIPPED | OUTPATIENT
Start: 2018-02-19 | End: 2018-05-11

## 2018-02-19 RX ORDER — CODEINE PHOSPHATE AND GUAIFENESIN 10; 100 MG/5ML; MG/5ML
5 SOLUTION ORAL
Qty: 120 ML | Refills: 0 | Status: CANCELLED | OUTPATIENT
Start: 2018-02-19

## 2018-02-19 NOTE — PATIENT INSTRUCTIONS
Learning About Benefits From Quitting Smoking  How does quitting smoking make you healthier? If you're thinking about quitting smoking, you may have a few reasons to be smoke-free. Your health may be one of them. · When you quit smoking, you lower your risks for cancer, lung disease, heart attack, stroke, blood vessel disease, and blindness from macular degeneration. · When you're smoke-free, you get sick less often, and you heal faster. You are less likely to get colds, flu, bronchitis, and pneumonia. · As a nonsmoker, you may find that your mood is better and you are less stressed. When and how will you feel healthier? Quitting has real health benefits that start from day 1 of being smoke-free. And the longer you stay smoke-free, the healthier you get and the better you feel. The first hours  · After just 20 minutes, your blood pressure and heart rate go down. That means there's less stress on your heart and blood vessels. · Within 12 hours, the level of carbon monoxide in your blood drops back to normal. That makes room for more oxygen. With more oxygen in your body, you may notice that you have more energy than when you smoked. After 2 weeks  · Your lungs start to work better. · Your risk of heart attack starts to drop. After 1 month  · When your lungs are clear, you cough less and breathe deeper, so it's easier to be active. · Your sense of taste and smell return. That means you can enjoy food more than you have since you started smoking. Over the years  · After 1 year, your risk of heart disease is half what it would be if you kept smoking. · After 5 years, your risk of stroke starts to shrink. Within a few years after that, it's about the same as if you'd never smoked. · After 10 years, your risk of dying from lung cancer is cut by about half. And your risk for many other types of cancer is lower too. How would quitting help others in your life?   When you quit smoking, you improve the health of everyone who now breathes in your smoke. · Their heart, lung, and cancer risks drop, much like yours. · They are sick less. For babies and small children, living smoke-free means they're less likely to have ear infections, pneumonia, and bronchitis. · If you're a woman who is or will be pregnant someday, quitting smoking means a healthier . · Children who are close to you are less likely to become adult smokers. Where can you learn more? Go to http://mac-rufus.info/. Enter [id number] [id number] [id number] in the search box to learn more about \"Learning About Benefits From Quitting Smoking. \"  Current as of: 2017  Content Version: 11.4  © 2934-9300 [employer]. Care instructions adapted under license by [employer] (which disclaims liability or warranty for this information). If you have questions about a medical condition or this instruction, always ask your healthcare professional. [place] [address] any warranty or liability for your use of this information. Deciding About Using Medicines To Quit Smoking  Deciding About Using Medicines To Quit Smoking  What are the medicines you can use? Your doctor may prescribe varenicline (Chantix) or bupropion (Zyban). These medicines can help you cope with cravings for tobacco. They are pills that don't contain nicotine. You also can use nicotine replacement products. These do contain nicotine. There are many types. · Gum and lozenges slowly release nicotine into your mouth. · Patches stick to your skin. They slowly release nicotine into your bloodstream.  · An inhaler has a escalera that contains nicotine. You breathe in a puff of nicotine vapor through your mouth and throat. · Nasal spray releases a mist that contains nicotine. What are key points about this decision? · Using medicines can double your chances of quitting smoking. They can ease cravings and withdrawal symptoms.   · Getting counseling along with using medicine can raise your chances of quitting even more. · If you smoke fewer than 5 cigarettes a day, you may not need medicines to help you quit smoking. · These medicines have less nicotine than cigarettes. And by itself, nicotine is not nearly as harmful as smoking. The tars, carbon monoxide, and other toxic chemicals in tobacco cause the harmful effects. · The side effects of nicotine replacement products depend on the type of product. For example, a patch can make your skin red and itchy. Medicines in pill form can make you sick to your stomach. They can also cause dry mouth and trouble sleeping. For most people, the side effects are not bad enough to make them stop using the products. Why might you choose to use medicines to quit smoking? · You have tried on your own to stop smoking, but you were not able to stop. · You smoke more than 5 cigarettes a day. · You want to increase your chances of quitting smoking. · You want to reduce your cravings and withdrawal symptoms. · You feel the benefits of medicine outweigh the side effects. Why might you choose not to use medicine? · You want to try quitting on your own by stopping all at once (\"cold turkey\"). · You want to cut back slowly on the number of cigarettes you smoke. · You smoke fewer than 5 cigarettes a day. · You do not like using medicine. · You feel the side effects of medicines outweigh the benefits. · You are worried about the cost of medicines. Your decision  Thinking about the facts and your feelings can help you make a decision that is right for you. Be sure you understand the benefits and risks of your options, and think about what else you need to do before you make the decision. Where can you learn more? Go to http://mac-rufus.info/. Enter E507 in the search box to learn more about \"Deciding About Using Medicines To Quit Smoking. \"  Current as of: March 20, 2017  Content Version: 11.4  © 0912-9255 [employer]. Care instructions adapted under license by [employer] (which disclaims liability or warranty for this information). If you have questions about a medical condition or this instruction, always ask your healthcare professional. [address] any warranty or liability for your use of this information.

## 2018-02-19 NOTE — TELEPHONE ENCOUNTER
Patient returned Rekha's call and I answered the phone. She states Dr. Celina Holm will be keeping her on lasix 160 mg daily but adding metolazone 2.5 mg as a one time dose and then as needed for fluid. He will have her take KCL 20 meq bid. Also will decrease her coreg to 12.5 mg bid. She states she has been taking 32 units of lantus daily but this morning increased her dose to 34 units. I told her to take a now dose of humalog 4 units and to stay on the 34 units of lantus daily until she loses 5 lbs. she voiced understanding of this plan.

## 2018-02-19 NOTE — TELEPHONE ENCOUNTER
Patient stated that she is returning your call from earlier and that her blood sugar reading at 3pm was 304. She can be reached at [phone].

## 2018-02-19 NOTE — MR AVS SNAPSHOT
[address] [place] [address] 
[phone] Patient: Wes Mcnulty MRN: [id number] Visit Information Date & Time Provider Department Dept. Phone Encounter #  
 2/19/2018  8:00 AM Neli Cheung, [address]  [id number] Follow-up Instructions Return in about 6 months (around 8/19/2018). Your Appointments 3/13/2018  9:10 AM  
Follow Up with MD [facility] [address] [place] [address]) [facility] [phone] [address] [place] [address] Upcoming Health Maintenance Date Due HEMOGLOBIN A1C Q6M 5/21/2018 FOOT EXAM Q1 9/28/2018 MICROALBUMIN Q1 9/28/2018 LIPID PANEL Q1 9/28/2018 Pneumococcal 19-64 Highest Risk (3 of 3 - PCV13) 11/14/2018 EYE EXAM RETINAL OR DILATED Q1 11/14/2018 PAP AKA CERVICAL CYTOLOGY 10/9/2020 DTaP/Tdap/Td series (2 - Td) 12/15/2027 Allergies as of 2/19/2018  Review Complete On: 2/19/2018 By: Jj Sidhu III, DO Severity Noted Reaction Type Reactions Zocor [Simvastatin] Medium 11/20/2009   Intolerance Myalgia Lisinopril  05/26/2011    Cough Current Immunizations  Never Reviewed Name Date Influenza Vaccine (Quad) PF 11/14/2017 Influenza Vaccine Split 10/1/2012 Pneumococcal Polysaccharide (PPSV-23) 11/14/2017 Tdap 12/15/2017 Not reviewed this visit You Were Diagnosed With   
  
 Codes Comments Tobacco abuse    -  Primary ICD-10-CM: Z72.0 ICD-9-CM: 305.1 Cough     ICD-10-CM: R05 ICD-9-CM: 786.2 Essential hypertension     ICD-10-CM: I10 
ICD-9-CM: 401.9 Anxiety and depression     ICD-10-CM: F41.8 ICD-9-CM: 300.00, 311  Uncontrolled type I diabetes mellitus with neuropathy ([facility] [address]     ICD-10-CM: E10.40, E10.65 
 ICD-9-CM: 250.63, 357.2 Bronchitis     ICD-10-CM: J40 ICD-9-CM: 601 Vitals BP Pulse Temp Resp Height(growth percentile) Weight(growth percentile) 113/72 (BP 1 Location: Left arm, BP Patient Position: Sitting) 94 98 °F (36.7 °C) (Oral) 16 5' 5\" (1.651 m) 197 lb (89.4 kg) SpO2 BMI OB Status Smoking Status 98% 32.78 kg/m2 Implant Current Every Day Smoker Vitals History BMI and BSA Data Body Mass Index Body Surface Area 32.78 kg/m 2 2.02 m 2 Preferred Pharmacy Pharmacy Name Phone Kaela Riggins [address] [facility], [address] [place] [address] [phone] Your Updated Medication List  
  
   
This list is accurate as of: 2/19/18  8:34 AM.  Always use your most recent med list.  
  
  
  
  
 ABILIFY 5 mg tablet Generic drug:  ARIPiprazole Take  by mouth daily. acetaminophen 500 mg tablet Commonly known as:  TYLENOL Take 1 Tab by mouth every six (6) hours as needed for Pain. albuterol 90 mcg/actuation inhaler Commonly known as:  PROVENTIL HFA, VENTOLIN HFA, PROAIR HFA Take 1 Puff by inhalation every four (4) hours as needed for Wheezing. ALPRAZolam 0.5 mg tablet Commonly known as:  Olene Shoe Take 1 Tab by mouth three (3) times daily as needed for Anxiety. Max Daily Amount: 1.5 mg.  
  
 atorvastatin 40 mg tablet Commonly known as:  LIPITOR Take 1 Tab by mouth daily. carvedilol 25 mg tablet Commonly known as:  Daryle Rubins Take 25 mg by mouth two (2) times daily (with meals). EFFEXOR  mg capsule Generic drug:  venlafaxine-SR Take 150 mg by mouth two (2) times a day. furosemide 40 mg tablet Commonly known as:  LASIX Take 1 Tab by mouth daily. gabapentin 300 mg capsule Commonly known as:  NEURONTIN Take 1 Cap by mouth three (3) times daily. glucagon 1 mg injection Commonly known as:  GLUCAGON EMERGENCY KIT (HUMAN) Use as directed HumaLOG KwikPen Insulin 100 unit/mL kwikpen Generic drug:  insulin lispro 1:15 for carbs and 1:50 > 150 for correction. Max 50 units per day  
  
 insulin glargine 100 unit/mL (3 mL) Inpn Commonly known as:  LANTUS SOLOSTAR U-100 INSULIN  
32 Units by SubCUTAneous route daily. Dose change 1/30/18--updated med list--did not send prescription to the pharmacy Insulin Needles (Disposable) 29 gauge x 1/2\" Ndle Commonly known as:  PEN NEEDLE  
1 Each by Does Not Apply route Multiple. As directed  
  
 magnesium oxide 400 mg tablet Commonly known as:  MAG-OX Take 1 Tab by mouth daily. One Touch Delica 33 gauge Misc Generic drug:  lancets Test 8 times daily--DX E10.65  
  
 pantoprazole 40 mg tablet Commonly known as:  PROTONIX Take 1 Tab by mouth daily. potassium chloride 20 mEq tablet Commonly known as:  K-DUR, KLOR-CON Take 1 Tab by mouth daily. promethazine 25 mg tablet Commonly known as:  PHENERGAN Take 1 Tab by mouth every six (6) hours as needed for Nausea. varenicline 0.5 mg (11)- 1 mg (42) Dspk Commonly known as:  CHANTIX STARTER DELILAH Take as instructed. Let me know when you need the full dose prescription. Prescriptions Sent to Pharmacy Refills  
 pantoprazole (PROTONIX) 40 mg tablet 9 Sig: Take 1 Tab by mouth daily. Class: Normal  
 Pharmacy: [address] Ph #: [phone] Route: Oral  
 varenicline (CHANTIX STARTER DELILAH) 0.5 mg (11)- 1 mg (42) DsPk 0 Sig: Take as instructed. Let me know when you need the full dose prescription. Class: Normal  
 Pharmacy: [address] Ph #: [phone] Follow-up Instructions Return in about 6 months (around 8/19/2018). To-Do List   
 02/21/2018 8:00 AM  
  Appointment with [address] [place] [address] 3 at [facility] ([phone]) NM GASTRIC STUDY - The patient must not eat or drink anything 4 hours prior to the procedure. FOR [facility] Patients- The patient must not eat or drink anything after midnight prior to the procedure. The Patient will be given eggs and if allergic will be given oatmeal, then scanned to see how the digestive system is working. If patient takes Reglan, it is up to the physician to take or not take on the day of the procedure. [facility] -OATMEAL ONLY  1. Do not schedule an Upper GI or Small Bowel on the same day as this procedure. 2.  Do not schedule an Endoscopy procedure the same day as this procedure. 3.  A patient can only have one Nuclear Medicine test per day (don't schedule Pet Scan and Nuc Med on same day). If patient needs multiple Nuclear Medicine tests, do not schedule on consecutive days - there must be at least one full day in between the tasks. 4.  If U/S requested, should go BEFORE Gastric Emptying Patient Instructions Learning About Benefits From Quitting Smoking How does quitting smoking make you healthier? If you're thinking about quitting smoking, you may have a few reasons to be smoke-free. Your health may be one of them. · When you quit smoking, you lower your risks for cancer, lung disease, heart attack, stroke, blood vessel disease, and blindness from macular degeneration. · When you're smoke-free, you get sick less often, and you heal faster. You are less likely to get colds, flu, bronchitis, and pneumonia. · As a nonsmoker, you may find that your mood is better and you are less stressed. When and how will you feel healthier? Quitting has real health benefits that start from day 1 of being smoke-free. And the longer you stay smoke-free, the healthier you get and the better you feel. The first hours · After just 20 minutes, your blood pressure and heart rate go down. That means there's less stress on your heart and blood vessels. · Within 12 hours, the level of carbon monoxide in your blood drops back to normal. That makes room for more oxygen. With more oxygen in your body, you may notice that you have more energy than when you smoked. After 2 weeks · Your lungs start to work better. · Your risk of heart attack starts to drop. After 1 month · When your lungs are clear, you cough less and breathe deeper, so it's easier to be active. · Your sense of taste and smell return. That means you can enjoy food more than you have since you started smoking. Over the years · After 1 year, your risk of heart disease is half what it would be if you kept smoking. · After 5 years, your risk of stroke starts to shrink. Within a few years after that, it's about the same as if you'd never smoked. · After 10 years, your risk of dying from lung cancer is cut by about half. And your risk for many other types of cancer is lower too. How would quitting help others in your life? When you quit smoking, you improve the health of everyone who now breathes in your smoke. · Their heart, lung, and cancer risks drop, much like yours. · They are sick less. For babies and small children, living smoke-free means they're less likely to have ear infections, pneumonia, and bronchitis. · If you're a woman who is or will be pregnant someday, quitting smoking means a healthier . · Children who are close to you are less likely to become adult smokers. Where can you learn more? Go to http://mac-rufus.info/. Enter [id number] [id number] [id number] 11 in the search box to learn more about \"Learning About Benefits From Quitting Smoking. \" Current as of: 2017 Content Version: 11.4 © 1403-0734 [employer]. Care instructions adapted under license by [employer] (which disclaims liability or warranty for this information).  If you have questions about a medical condition or this instruction, always ask your healthcare professional. [place] [address] any warranty or liability for your use of this information. Deciding About Using Medicines To Quit Smoking Deciding About Using Medicines To Quit Smoking What are the medicines you can use? Your doctor may prescribe varenicline (Chantix) or bupropion (Zyban). These medicines can help you cope with cravings for tobacco. They are pills that don't contain nicotine. You also can use nicotine replacement products. These do contain nicotine. There are many types. · Gum and lozenges slowly release nicotine into your mouth. · Patches stick to your skin. They slowly release nicotine into your bloodstream. 
· An inhaler has a escalera that contains nicotine. You breathe in a puff of nicotine vapor through your mouth and throat. · Nasal spray releases a mist that contains nicotine. What are key points about this decision? · Using medicines can double your chances of quitting smoking. They can ease cravings and withdrawal symptoms. · Getting counseling along with using medicine can raise your chances of quitting even more. · If you smoke fewer than 5 cigarettes a day, you may not need medicines to help you quit smoking. · These medicines have less nicotine than cigarettes. And by itself, nicotine is not nearly as harmful as smoking. The tars, carbon monoxide, and other toxic chemicals in tobacco cause the harmful effects. · The side effects of nicotine replacement products depend on the type of product. For example, a patch can make your skin red and itchy. Medicines in pill form can make you sick to your stomach. They can also cause dry mouth and trouble sleeping. For most people, the side effects are not bad enough to make them stop using the products. Why might you choose to use medicines to quit smoking? · You have tried on your own to stop smoking, but you were not able to stop. · You smoke more than 5 cigarettes a day. · You want to increase your chances of quitting smoking. · You want to reduce your cravings and withdrawal symptoms. · You feel the benefits of medicine outweigh the side effects. Why might you choose not to use medicine? · You want to try quitting on your own by stopping all at once (\"cold turkey\"). · You want to cut back slowly on the number of cigarettes you smoke. · You smoke fewer than 5 cigarettes a day. · You do not like using medicine. · You feel the side effects of medicines outweigh the benefits. · You are worried about the cost of medicines. Your decision Thinking about the facts and your feelings can help you make a decision that is right for you. Be sure you understand the benefits and risks of your options, and think about what else you need to do before you make the decision. Where can you learn more? Go to http://mac-rufus.info/. Enter I491 in the search box to learn more about \"Deciding About Using Medicines To Quit Smoking. \" Current as of: March 20, 2017 Content Version: 11.4 © 1334-0192 Healthwise[employer] Incorporated. Care instructions adapted under license by [employer] (which disclaims liability or warranty for this information). If you have questions about a medical condition or this instruction, always ask your healthcare professional. [address] any warranty or liability for your use of this information. Introducing [address]! Dear Gregoria Milligan: 
Thank you for requesting a [employer] account. Our records indicate that you already have an active [employer] account. You can access your account anytime at [email or web address] Did you know that you can access your hospital and ER discharge instructions at any time in [employer]? You can also review all of your test results from your hospital stay or ER visit. Additional Information If you have questions, please visit the Frequently Asked Questions section of the S[employer] website at [email or web address] Remember, LiquidTalk is NOT to be used for urgent needs. For medical emergencies, dial 911. Now available from your iPhone and Android! Please provide this summary of care documentation to your next provider. Your primary care clinician is listed as Paty Gonzalez If you have any questions after today's visit, please call [phone].

## 2018-02-19 NOTE — TELEPHONE ENCOUNTER
Patient stated that she took 12 units of humalog 30 minutes ago. Patient stated that she ate chicken for breakfast but no lunch. She stated she has not checked her sugar but will call back with the data in 30 minutes. She stated that she took that dose because she was just sick of it. She wanted to know if her diabetes could be causing her fluid retention or if the fluid retention is causing the elevated sugar.

## 2018-02-19 NOTE — TELEPHONE ENCOUNTER
Porter Pak (emily friend) called with Megan Sepulveda in the background to stated that Megan Sepulveda is full of fluid and she was currently on hold with her kidney doctor. He stated that Megan Sepulveda sugar is 440 at this time and she has already took the corrective dose of insulin. He stated that yesterday her sugar was over 500 but dropped to 40 after she had taken the insulin needed to correct it. Porter Pak would like to know if Megan Sepulveda needs to take an extra dose of insujlin.

## 2018-02-19 NOTE — TELEPHONE ENCOUNTER
Please let her know it would be worth waiting a full 2 hours after she took the 12 units so we can see the effect of this dose. It's like the fluid retention is causing the fluctuating sugars and not the other way around.

## 2018-02-19 NOTE — PROGRESS NOTES
Reviewed record in preparation for visit and have obtained necessary documentation. Identified pt with two pt identifiers(name and ). Chief Complaint   Patient presents with    Follow-up       There are no preventive care reminders to display for this patient. Ms. Ta Lane has a reminder for a \"due or due soon\" health maintenance. I have asked that she discuss health maintenance topic(s) due with Her  primary care provider. Coordination of Care Questionnaire:  :     1) Have you been to an emergency room, urgent care clinic since your last visit? no   Hospitalized since your last visit? no             2) Have you seen or consulted any other health care providers outside of [address] [place] [address] since your last visit? yes  (Include any pap smears or colon screenings in this section.)    3) Do you have an Advance Directive on file? no    4) Are you interested in receiving information on Advance Directives? NO    Patient is accompanied by self I have received verbal consent from Phyllis Silver to discuss any/all medical information while they are present in the room.

## 2018-02-19 NOTE — PROGRESS NOTES
Reza Wilson is a 56 y.o. female who presents for evaluation of routine follow up. Last seen by me dec 15, 2017. Since then, missed numerous appts, but did follow up with dr milan here on feb 6 with uri. Got better after augmentin and yary ac. Still smokes, but is interested in starting chantix again. Had taken it before when she lived in [place]. Sugars remain labile. Dad with her today again.       ROS:  Constitutional: negative for fevers, chills, anorexia and weight loss  Eyes:   negative for visual disturbance and irritation  ENT:   negative for tinnitus,sore throat,nasal congestion,ear pain,hoarseness  Respiratory:  negative for  hemoptysis, dyspnea,wheezing.   ++cough  CV:   negative for chest pain, palpitations, lower extremity edema  GI:   negative for nausea, vomiting, diarrhea, abdominal pain,melena  Genitourinary: negative for frequency, dysuria and hematuria  Musculoskel: negative for myalgias, arthralgias, back pain, muscle weakness, joint pain  Neurological:  negative for headaches, dizziness, focal weakness, numbness  Psychiatric:     Negative for depression or anxiety      Past Medical History:   Diagnosis Date    Achilles tendon rupture     along with torn right patellar/femoral ligament    Arthritis     rt. foot    Chronic pain     abdominal pain    Diabetes (HCC)     IDDM on insulin pump and sensor    DM type [facility] mellitus[facility]     age 25    Endometriosis     s/p ex-lap 4x    Gastric ulcer     GERD (gastroesophageal reflux disease)     Herpes     Other and unspecified hyperlipidemia     Panic attacks     Psychiatric disorder     anxiety, panic attacks    PTSD (post-traumatic stress disorder)     Unspecified essential hypertension        Past Surgical History:   Procedure Laterality Date    HX GYN      2 d&c's    HX GYN      laparoscopies x3    HX ORTHOPAEDIC  2002    achiles tendon repair,talus repair    HX ORTHOPAEDIC      injections x2 to right shoulder       Family History   Problem Relation Age of Onset    Diabetes Mother      diet controlled    Diabetes Father     Heart Disease Paternal Uncle      MI in his 46s    Stroke Neg Hx        Social History     Social History    Marital status: SINGLE     Spouse name: N/A    Number of children: N/A    Years of education: N/A     Occupational History    Not on file. Social History Main Topics    Smoking status: Current Every Day Smoker     Packs/day: 1.50     Years: 17.00     Last attempt to quit: 11/28/2017    Smokeless tobacco: Current User      Comment: Trying to quit    Alcohol use No      Comment: a beer every few months    Drug use: No    Sexual activity: Yes     Partners: Male     Other Topics Concern    Not on file     Social History Narrative            Visit Vitals    /72 (BP 1 Location: Left arm, BP Patient Position: Sitting)    Pulse 94    Temp 98 °F (36.7 °C) (Oral)    Resp 16    Ht 5' 5\" (1.651 m)    Wt 197 lb (89.4 kg)    SpO2 98%    BMI 32.78 kg/m2       Physical Examination:   General - Well appearing female  HEENT - PERRL, TM no erythema/opacification, normal nasal turbinates, no oropharyngeal erythema or exudate, MMM  Neck - supple, no bruits, no thyroidomegaly, no lymphadenopathy  Pulm - clear to auscultation bilaterally  Cardio - RRR, normal S1 S2, no murmur  Abd - soft, nontender, no masses, no HSM  Extrem - no edema, +2 distal pulses  Neuro-  No focal deficits, CN intact     Assessment/Plan:    1. Acute bronchitis--resolved, finished augmentin  2. Tobacco abuse--rx given for chantix  3.  gerd--rx for protonix. Has appt upcoming with GI  4. Uncontrolled dm, type 1--on lantus. Follows with dr Lulu Sacks  5.  htn--continue coreg, lasix  6. Anxiety and depression--continue abilify, effexor  7.  hyperlipids--on lipitor    Has eye exam with dr Ansley Leos in march.   rtc 6 months        Nyra Pap III, DO

## 2018-02-19 NOTE — TELEPHONE ENCOUNTER
Based on the scale she should take 6 units of humalog for a sugar of 440, to be safe have her take 4 units of humalog now to help bring down her sugar.

## 2018-02-19 NOTE — TELEPHONE ENCOUNTER
Have her take another dose of humalog 4 units now. Clarify how much lantus she is taking because if her weight is currently higher, she would benefit from going up on her lantus by 2 units from whatever her current dose is and then as her weight come down by at least 5 lbs after losing the fluid weight, she can go back down on the lantus by 2 units.

## 2018-02-21 ENCOUNTER — HOSPITAL ENCOUNTER (OUTPATIENT)
Dept: NUCLEAR MEDICINE | Age: 48
Discharge: HOME | End: 2018-02-21
Attending: INTERNAL MEDICINE
Payer: MEDICAID

## 2018-02-21 DIAGNOSIS — K21.9 GERD (GASTROESOPHAGEAL REFLUX DISEASE): ICD-10-CM

## 2018-02-21 DIAGNOSIS — R68.81 EARLY SATIETY: ICD-10-CM

## 2018-02-21 DIAGNOSIS — R14.0 ABDOMINAL DISTENSION (GASEOUS): ICD-10-CM

## 2018-02-21 PROCEDURE — 78264 GASTRIC EMPTYING IMG STUDY: CPT

## 2018-03-26 RX ORDER — PEN NEEDLE, DIABETIC 31 GX3/16"
NEEDLE, DISPOSABLE MISCELLANEOUS
Qty: 100 PEN NEEDLE | Refills: 11 | Status: SHIPPED | OUTPATIENT
Start: 2018-03-26 | End: 2018-08-16 | Stop reason: SDUPTHER

## 2018-03-26 RX ORDER — PEN NEEDLE, DIABETIC 29 G X1/2"
1 NEEDLE, DISPOSABLE MISCELLANEOUS
Qty: 100 PEN NEEDLE | Refills: 11 | Status: CANCELLED | OUTPATIENT
Start: 2018-03-26

## 2018-04-24 ENCOUNTER — OFFICE VISIT (OUTPATIENT)
Dept: ENDOCRINOLOGY | Age: 48
End: 2018-04-24

## 2018-04-24 VITALS
DIASTOLIC BLOOD PRESSURE: 84 MMHG | HEART RATE: 106 BPM | SYSTOLIC BLOOD PRESSURE: 130 MMHG | BODY MASS INDEX: 31.36 KG/M2 | HEIGHT: 65 IN | WEIGHT: 188.2 LBS

## 2018-04-24 DIAGNOSIS — R79.89 ELEVATED LFTS: ICD-10-CM

## 2018-04-24 DIAGNOSIS — I10 ESSENTIAL HYPERTENSION: Chronic | ICD-10-CM

## 2018-04-24 DIAGNOSIS — E78.5 HYPERLIPIDEMIA LDL GOAL <100: ICD-10-CM

## 2018-04-24 DIAGNOSIS — N17.9 ACUTE RENAL FAILURE, UNSPECIFIED ACUTE RENAL FAILURE TYPE (HCC): ICD-10-CM

## 2018-04-24 PROBLEM — E11.21 TYPE 2 DIABETES WITH NEPHROPATHY (HCC): Status: ACTIVE | Noted: 2018-04-24

## 2018-04-24 LAB — HBA1C MFR BLD HPLC: 8.2 %

## 2018-04-24 RX ORDER — METOCLOPRAMIDE 5 MG/1
5 TABLET ORAL AS NEEDED
COMMUNITY
Start: 2018-04-24 | End: 2018-05-11

## 2018-04-24 RX ORDER — INSULIN GLARGINE 100 [IU]/ML
32 INJECTION, SOLUTION SUBCUTANEOUS DAILY
Qty: 15 ML | Refills: 1
Start: 2018-04-24 | End: 2018-07-24 | Stop reason: SDUPTHER

## 2018-04-24 RX ORDER — ESOMEPRAZOLE MAGNESIUM 40 MG/1
CAPSULE, DELAYED RELEASE ORAL DAILY
COMMUNITY
End: 2018-07-24

## 2018-04-24 RX ORDER — GABAPENTIN 300 MG/1
600 CAPSULE ORAL 3 TIMES DAILY
COMMUNITY
Start: 2018-04-24 | End: 2018-10-08 | Stop reason: SDUPTHER

## 2018-04-24 NOTE — PATIENT INSTRUCTIONS
1) If your sugar is over 150, you can take a dose of humalog based on the scale below:  Blood sugar            151-180  1 units      181-210  2 units     210-240  3 units      241-270  4 units      271-300  5 units  301-330  6 units  331-360  7 units  361-390  8 units  391-420  9 units  421-450  10 units  451-480  11 units  481-510  12 units  Over 510 13 units    At Bedtime, to be safe take 1 unit less than what is on the scale above    2) I will e-mail you with your lab results. 3) If you are having readings under 90 overnight more than 2 times per week, decrease lantus to 30 units. 4) If you notice pitting in your legs, you can take one metolazone 30 minutes before your lasix.

## 2018-04-24 NOTE — MR AVS SNAPSHOT
[address] [facility] [address] [address] [phone] Patient: Víctor Carson MRN: [id number] SAD:[birth year] Visit Information Date & Time Provider Department Dept. Phone Encounter #  
 4/24/2018  9:30 AM Tapan Bolaños, [address] [place] [address] Diabetes and Endocrinology [phone] [id number] Follow-up Instructions Return for 7/24/18 at 9:30am. Upcoming Health Maintenance Date Due  
 MEDICARE YEARLY EXAM 4/12/2018 HEMOGLOBIN A1C Q6M 5/21/2018 FOOT EXAM Q1 9/28/2018 MICROALBUMIN Q1 9/28/2018 LIPID PANEL Q1 9/28/2018 Pneumococcal 19-64 Highest Risk (3 of 3 - PCV13) 11/14/2018 EYE EXAM RETINAL OR DILATED Q1 3/27/2019 PAP AKA CERVICAL CYTOLOGY 10/9/2020 DTaP/Tdap/Td series (2 - Td) 12/15/2027 Allergies as of 4/24/2018  Review Complete On: 4/24/2018 By: Tapan Bolaños MD  
  
 Severity Noted Reaction Type Reactions Zocor [Simvastatin] Medium 11/20/2009   Intolerance Myalgia Lisinopril  05/26/2011    Cough Current Immunizations  Never Reviewed Name Date Influenza Vaccine (Quad) PF 11/14/2017 Influenza Vaccine Split 10/1/2012 Pneumococcal Polysaccharide (PPSV-23) 11/14/2017 Tdap 12/15/2017 Not reviewed this visit You Were Diagnosed With   
  
 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 
ICD-9-CM: 401.9 Hyperlipidemia LDL goal <100     ICD-10-CM: E78.5 ICD-9-CM: 272.4 Acute renal failure, unspecified acute renal failure type ([facility] [address]     ICD-10-CM: N17.9 ICD-9-CM: 079. 9 Vitals BP Pulse Height(growth percentile) Weight(growth percentile) BMI OB Status 130/84 (!) 106 5' 5\" (1.651 m) 188 lb 3.2 oz (85.4 kg) 31.32 kg/m2 Implant Smoking Status Current Every Day Smoker Vitals History BMI and BSA Data Body Mass Index Body Surface Area  
 31.32 kg/m 2 1.98 m 2 Preferred Pharmacy Pharmacy Name Phone [facility] [address], [address] [place] [address] [phone] Your Updated Medication List  
  
   
This list is accurate as of 4/24/18 10:35 AM.  Always use your most recent med list.  
  
  
  
  
 ABILIFY 5 mg tablet Generic drug:  ARIPiprazole Take 10 mg by mouth daily. acetaminophen 500 mg tablet Commonly known as:  TYLENOL Take 1 Tab by mouth every six (6) hours as needed for Pain. albuterol 90 mcg/actuation inhaler Commonly known as:  PROVENTIL HFA, VENTOLIN HFA, PROAIR HFA Take 1 Puff by inhalation every four (4) hours as needed for Wheezing. ALPRAZolam 0.5 mg tablet Commonly known as:  Emani Stai Take 1 Tab by mouth three (3) times daily as needed for Anxiety. Max Daily Amount: 1.5 mg.  
  
 atorvastatin 40 mg tablet Commonly known as:  LIPITOR Take 1 Tab by mouth daily. carvedilol 25 mg tablet Commonly known as:  Sina Guillermo Take 0.5 Tabs by mouth two (2) times daily (with meals). EFFEXOR  mg capsule Generic drug:  venlafaxine-SR Take 150 mg by mouth two (2) times a day. furosemide 40 mg tablet Commonly known as:  LASIX  
2 tablets twice daily--Dose change 2/19/18--updated med list--did not send prescription to the pharmacy  
  
 gabapentin 300 mg capsule Commonly known as:  NEURONTIN Take 2 Caps by mouth three (3) times daily. glucagon 1 mg injection Commonly known as:  GLUCAGON EMERGENCY KIT (HUMAN) Use as directed HumaLOG KwikPen Insulin 100 unit/mL kwikpen Generic drug:  insulin lispro 1:15 for carbs and 1:30 > 150 for correction during the day and > 180 at bedtime. Max 50 units per day  
  
 insulin glargine 100 unit/mL (3 mL) Inpn Commonly known as:  LANTUS SOLOSTAR U-100 INSULIN  
 32 Units by SubCUTAneous route daily. Dose change 4/24/18--updated med list--did not send prescription to the pharmacy Insulin Needles (Disposable) 31 gauge x 3/16\" Ndle Commonly known as:  BD ULTRA-FINE MINI PEN NEEDLE Use as directed up to 4 times daily  
  
 magnesium oxide 400 mg tablet Commonly known as:  MAG-OX Take 1 Tab by mouth daily. metoclopramide HCl 5 mg tablet Commonly known as:  REGLAN Take 1 Tab by mouth as needed. metOLazone 2.5 mg tablet Commonly known as:  Dania Audrey Take 1 Tab by mouth as needed. NexIUM 40 mg capsule Generic drug:  esomeprazole Take  by mouth daily. One Touch Delica 33 gauge Misc Generic drug:  lancets Test 8 times daily--DX E10.65  
  
 potassium chloride 20 mEq tablet Commonly known as:  K-DUR, KLOR-CON Take 1 tab bid  
  
 promethazine 25 mg tablet Commonly known as:  PHENERGAN Take 1 Tab by mouth every six (6) hours as needed for Nausea. varenicline 0.5 mg (11)- 1 mg (42) Dspk Commonly known as:  CHANTIX STARTER DELILAH Take as instructed. Let me know when you need the full dose prescription. We Performed the Following AMB POC HEMOGLOBIN A1C [59702 CPT(R)] CBC W/O DIFF [23990 CPT(R)] LIPID PANEL [30700 CPT(R)] MICROALBUMIN, UR, RAND W/ MICROALB/CREAT RATIO [id number] CPT(R)] RENAL FUNCTION PANEL [90403 CPT(R)] Follow-up Instructions Return for 7/24/18 at 9:30am.  
  
  
Patient Instructions 1) If your sugar is over 150, you can take a dose of humalog based on the scale below: 
Blood sugar 151-180  1 units 181-210  2 units 210-240  3 units 241-270  4 units 271-300  5 units 301-330  6 units 331-360  7 units 361-390  8 units 391-420  9 units 421-450  10 units 451-480  11 units 481-510  12 units Over 510 13 units At Bedtime, to be safe take 1 unit less than what is on the scale above 2) I will e-mail you with your lab results. 3) If you are having readings under 90 overnight more than 2 times per week, decrease lantus to 30 units. 4) If you notice pitting in your legs, you can take one metolazone 30 minutes before your lasix. Introducing [facility]! Dear Mirta Osorio: 
Thank you for requesting a [employer] account. Our records indicate that you already have an active [employer] account. You can access your account anytime at [email or web address] Did you know that you can access your hospital and ER discharge instructions at any time in [employer]? You can also review all of your test results from your hospital stay or ER visit. Additional Information If you have questions, please visit the Frequently Asked Questions section of the [employer] website at [email or web address] Remember, [employer] is NOT to be used for urgent needs. For medical emergencies, dial 911. Now available from your iPhone and Android! Please provide this summary of care documentation to your next provider. Your primary care clinician is listed as Hicks Job If you have any questions after today's visit, please call [phone].

## 2018-04-24 NOTE — PROGRESS NOTES
Chief Complaint   Patient presents with    Diabetes     History of Present Illness: Danyel Farr is a 53 y.o. female here for follow up of diabetes. Weight stable since last visit in 1/18. She was approved for disability and got back pay for 2 years and was able to get section 8 voucher for housing. Her gastric emptying study was normal so she only takes the reglan now as needed. Her protonix was changed to nexium. Has been able to decrease the lantus to 32 units daily for the past 2-3 weeks and prior to this was still on 34 units daily. State she is dosing humalog 1:25 for correction until 3pm and 1:50 after 3 pm and sometimes this is too much during the day and not enough at night. Having a lot of variability in her sugars when checked 8 times per day with some in the 50s and some over 400.  30 day avg is 230. Dr. Deja Betancur asked for a renal panel and CBC and urine microalbumin so will collect these today for him. Was unclear when to take the metolazone so I gave her clarification below based on her edema. Current Outpatient Prescriptions   Medication Sig    esomeprazole (NEXIUM) 40 mg capsule Take  by mouth daily.  gabapentin (NEURONTIN) 300 mg capsule Take 2 Caps by mouth three (3) times daily.  metoclopramide HCl (REGLAN) 5 mg tablet Take 1 Tab by mouth as needed.  insulin glargine (LANTUS SOLOSTAR U-100 INSULIN) 100 unit/mL (3 mL) inpn 32 Units by SubCUTAneous route daily. Dose change 4/24/18--updated med list--did not send prescription to the pharmacy    Insulin Needles, Disposable, (BD INSULIN PEN NEEDLE UF MINI) 31 gauge x 3/16\" ndle Use as directed up to 4 times daily    varenicline (CHANTIX STARTER DELILAH) 0.5 mg (11)- 1 mg (42) DsPk Take as instructed. Let me know when you need the full dose prescription.  carvedilol (COREG) 25 mg tablet Take 0.5 Tabs by mouth two (2) times daily (with meals).     furosemide (LASIX) 40 mg tablet 2 tablets twice daily--Dose change 2/19/18--updated med list--did not send prescription to the pharmacy    potassium chloride (K-DUR, KLOR-CON) 20 mEq tablet Take 1 tab bid    metOLazone (ZAROXOLYN) 2.5 mg tablet Take 1 Tab by mouth as needed.  albuterol (PROVENTIL HFA, VENTOLIN HFA, PROAIR HFA) 90 mcg/actuation inhaler Take 1 Puff by inhalation every four (4) hours as needed for Wheezing.  glucagon (GLUCAGON EMERGENCY KIT, HUMAN,) 1 mg injection Use as directed    magnesium oxide (MAG-OX) 400 mg tablet Take 1 Tab by mouth daily.  ARIPiprazole (ABILIFY) 5 mg tablet Take 10 mg by mouth daily.  ONE TOUCH DELICA 33 gauge misc Test 8 times daily--DX E10.65    atorvastatin (LIPITOR) 40 mg tablet Take 1 Tab by mouth daily.  venlafaxine-SR (EFFEXOR XR) 150 mg capsule Take 150 mg by mouth two (2) times a day.  insulin lispro (HUMALOG KWIKPEN) 100 unit/mL kwikpen 1:15 for carbs and 1:50 > 150 for correction. Max 50 units per day    acetaminophen (TYLENOL) 500 mg tablet Take 1 Tab by mouth every six (6) hours as needed for Pain.  ALPRAZolam (XANAX) 0.5 mg tablet Take 1 Tab by mouth three (3) times daily as needed for Anxiety. Max Daily Amount: 1.5 mg.  promethazine (PHENERGAN) 25 mg tablet Take 1 Tab by mouth every six (6) hours as needed for Nausea. No current facility-administered medications for this visit. Allergies   Allergen Reactions    Zocor [Simvastatin] Myalgia    Lisinopril Cough     Review of Systems:  - Eyes: no blurry vision or double vision  - Cardiovascular: no chest pain  - Respiratory: no shortness of breath  - Musculoskeletal: no myalgias  - Neurological: no numbness/tingling in extremities    Physical Examination:  Blood pressure 130/84, pulse (!) 106, height 5' 5\" (1.651 m), weight 188 lb 3.2 oz (85.4 kg).   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 Her blood sugars are improving but still at risk for hyper and hypoglycemia due to fluctuations in her weight from fluid. Gave her a less aggressive scale to use. - cont lantus 32 units in the morning   - cont humalog 1:15 for carbs and take 1:30 for correction > 150 during the day and > 180 at night  - check bs up to 8 times daily due to fluctuating blood sugars  - foot exam done 9/17  - microalbumin was 69 in 7/10 up to 392 in March 2011 and 621 in May down to 216 in October 2011 and 37.3 in 4/14 and up to 183 in 12/15 and 300 in 9/16  - optho UTD 11/17  - check renal panel and microalbumin today    2) HTN NOS (401.9): Blood pressure was at goal < 140/90. She is currently off losartan since her hospital stay likely due to DEBORAH. - cont coreg back 25 mg bid   - cont lasix 40 mg to 80 mg daily based on fluid status per Dr. Deja Betancur  - cont metolazone 2.5 mg as needed based on edema    3) Hyperlipidemia (272.4): Given DM, goal LDL is < 100, non-HDL < 130, and TGs < 150. LDL was 146 in January 2010, down to 124 in July and 93 in March 2011 with taking 5 mg of crestor daily.   Her crestor was changed to pravastatin by Dr. Austin Srinivasan because of cost in Feb 2012. LDL 93 in 8/12 but her HDL was high at 138 due to ETOH intake. LDL down to 39 in 11/12 but up to 102 in 3/13. Off pravastatin at this time and previously was on 10 mg daily.  in 1/14. Up to 167 in 4/14 so started lovastatin 20 mg daily but she couldn't afford this. She has been started on crestor 40 mg daily by crossover clinic and LDL 64 in 10/14 and 55 in 1/15. Was 37 in 3/16 so dose decreased to 20 mg daily and LDL 23 in 2/17. Was changed to lipitor 1/2 of 80 mg daily when clinic couldn't get crestor any longer and LDL 37 in 9/17. This was stopped during her hospital stay in 11/17 but was restarted by Dr. Ti Garland in 12/17  - cont lipitor 40 mg daily  - check lipids today    4) Elevated LFTs (790.6): I told her previously that her LFTs were elevated likely due to ETOH intake so I advised her to cut back on this and her repeat LFTs were normal in 3/13 and 1/14 and 4/14 and 10/14 and 1/15 and 12/15 and 3/16. AST up to 76 in 2/17 but back to normal in 5/17 and 11/17 so will follow this. 5) Acute renal failure:  - draw renal panel and CBC per Dr. Nicki Lazaro      Patient Instructions   1) If your sugar is over 150, you can take a dose of humalog based on the scale below:  Blood sugar            151-180  1 units      181-210  2 units     210-240  3 units      241-270  4 units      271-300  5 units  301-330  6 units  331-360  7 units  361-390  8 units  391-420  9 units  421-450  10 units  451-480  11 units  481-510  12 units  Over 510 13 units    At Bedtime, to be safe take 1 unit less than what is on the scale above    2) I will e-mail you with your lab results. 3) If you are having readings under 90 overnight more than 2 times per week, decrease lantus to 30 units. 4) If you notice pitting in your legs, you can take one metolazone 30 minutes before your lasix.       Follow-up Disposition:  Return for 7/24/18 at 9:30am.    Copy sent to: Dr. Lilia Romberg Dr. Vila Moors Dr. Claudeen Hung via [facility]  Dr. Obdulio Tolliver    Lab follow up: 4/28/18  Component      Latest Ref Rng & Units 4/24/2018 4/24/2018 4/24/2018 4/24/2018           1:34 PM  1:34 PM  1:34 PM  1:34 PM   Glucose      65 - 99 mg/dL 162 (H)      BUN      6 - 24 mg/dL 22      Creatinine      0.57 - 1.00 mg/dL 1.08 (H)      GFR est non-AA      >59 mL/min/1.73 61      GFR est AA      >59 mL/min/1.73 71      BUN/Creatinine ratio      9 - 23 20      Sodium      134 - 144 mmol/L 133 (L)      Potassium      3.5 - 5.2 mmol/L 4.1      Chloride      96 - 106 mmol/L 86 (L)      CO2      18 - 29 mmol/L 25      Calcium      8.7 - 10.2 mg/dL 9.2      Phosphorus      2.5 - 4.5 mg/dL 5.3 (H)      Albumin      3.5 - 5.5 g/dL 4.4      WBC      3.4 - 10.8 x10E3/uL  8.9     RBC      3.77 - 5.28 x10E6/uL  4.21     HGB      11.1 - 15.9 g/dL  12.8     HCT      34.0 - 46.6 %  40.5     MCV      79 - 97 fL  96     MCH      26.6 - 33.0 pg  30.4     MCHC      31.5 - 35.7 g/dL  31.6     RDW      12.3 - 15.4 %  17.1 (H)     PLATELET      219 - 320 x10E3/uL  333     Cholesterol, total      100 - 199 mg/dL   144    Triglyceride      0 - 149 mg/dL   190 (H)    HDL Cholesterol      >39 mg/dL   64    VLDL, calculated      5 - 40 mg/dL   38    LDL, calculated      0 - 99 mg/dL   42    Creatinine, urine      Not Estab. mg/dL    41.7   Microalbumin, urine      Not Estab. ug/mL    35.7   Microalbumin/Creat. Ratio      0.0 - 30.0 mg/g creat    85.6 (H)     Sent her the following message through [employer]:    Total Cholesterol is the total number of cholesterol particles in your blood. Goal is less than 200. Your value is at goal.    Triglycerides are the short term fats in your blood. Goal is less than 150. Your value is above goal.    HDL is the good cholesterol in your blood. Goal is more than 50. Your value is at goal.    LDL is the bad cholesterol in your blood. Goal is less than 100.   Your value is at goal.    Continue to follow a low cholesterol diet. Try to limit the amount of fried foods, fatty foods, butter, gravy, red meat, ice cream, cheese, and eggs in your diet, which are all high in cholesterol. Take all of your medications (atorvastatin) as directed.  -------------------------------------------------------------------------------------------------------------------  BUN and creatinine are markers of kidney function. Your creatinine is slightly high at 1.08 but improved from 1.34 at last check. -------------------------------------------------------------------------------------------------------------------  Your hemoglobin (red blood cell count) is normal at 12.8 and up from 10.2 in 11/17 when you were in the hospital so you have no evidence of anemia.  -------------------------------------------------------------------------------------------------------------------  Microalbumin/creatinine ratio is a marker of the amount of protein in your urine. Goal is less than 30. Your value is abnormal at 85 but much improved from 300 in 9/16. This indicates that your kidneys are  being being less affected by your diabetes and/or blood pressure than 2 years ago.

## 2018-04-25 ENCOUNTER — TELEPHONE (OUTPATIENT)
Dept: ENDOCRINOLOGY | Age: 48
End: 2018-04-25

## 2018-04-25 LAB
ALBUMIN SERPL-MCNC: 4.4 G/DL (ref 3.5–5.5)
ALBUMIN/CREAT UR: 85.6 MG/G CREAT (ref 0–30)
BUN SERPL-MCNC: 22 MG/DL (ref 6–24)
BUN/CREAT SERPL: 20 (ref 9–23)
CALCIUM SERPL-MCNC: 9.2 MG/DL (ref 8.7–10.2)
CHLORIDE SERPL-SCNC: 86 MMOL/L (ref 96–106)
CHOLEST SERPL-MCNC: 144 MG/DL (ref 100–199)
CO2 SERPL-SCNC: 25 MMOL/L (ref 18–29)
CREAT SERPL-MCNC: 1.08 MG/DL (ref 0.57–1)
CREAT UR-MCNC: 41.7 MG/DL
ERYTHROCYTE [DISTWIDTH] IN BLOOD BY AUTOMATED COUNT: 17.1 % (ref 12.3–15.4)
GFR SERPLBLD CREATININE-BSD FMLA CKD-EPI: 61 ML/MIN/1.73
GFR SERPLBLD CREATININE-BSD FMLA CKD-EPI: 71 ML/MIN/1.73
GLUCOSE SERPL-MCNC: 162 MG/DL (ref 65–99)
HCT VFR BLD AUTO: 40.5 % (ref 34–46.6)
HDLC SERPL-MCNC: 64 MG/DL
HGB BLD-MCNC: 12.8 G/DL (ref 11.1–15.9)
INTERPRETATION, 910389: NORMAL
LDLC SERPL CALC-MCNC: 42 MG/DL (ref 0–99)
MCH RBC QN AUTO: 30.4 PG (ref 26.6–33)
MCHC RBC AUTO-ENTMCNC: 31.6 G/DL (ref 31.5–35.7)
MCV RBC AUTO: 96 FL (ref 79–97)
MICROALBUMIN UR-MCNC: 35.7 UG/ML
PHOSPHATE SERPL-MCNC: 5.3 MG/DL (ref 2.5–4.5)
PLATELET # BLD AUTO: 333 X10E3/UL (ref 150–379)
POTASSIUM SERPL-SCNC: 4.1 MMOL/L (ref 3.5–5.2)
RBC # BLD AUTO: 4.21 X10E6/UL (ref 3.77–5.28)
SODIUM SERPL-SCNC: 133 MMOL/L (ref 134–144)
TRIGL SERPL-MCNC: 190 MG/DL (ref 0–149)
VLDLC SERPL CALC-MCNC: 38 MG/DL (ref 5–40)
WBC # BLD AUTO: 8.9 X10E3/UL (ref 3.4–10.8)

## 2018-04-25 RX ORDER — INSULIN ASPART 100 [IU]/ML
INJECTION, SOLUTION INTRAVENOUS; SUBCUTANEOUS
Qty: 15 ML | Refills: 11 | Status: SHIPPED | OUTPATIENT
Start: 2018-04-25 | End: 2018-07-24

## 2018-04-25 NOTE — TELEPHONE ENCOUNTER
I sent a supply of novolog pens to her pharmacy. Please find out if these went through and let her know that her insurance is no longer covering the humalog without trying novolog first and this will be dosed the same way as the humalog.

## 2018-05-07 ENCOUNTER — TELEPHONE (OUTPATIENT)
Dept: INTERNAL MEDICINE CLINIC | Age: 48
End: 2018-05-07

## 2018-05-07 NOTE — TELEPHONE ENCOUNTER
Pt requesting a call back concerning horse voice, sore throat and stuffiness that has been on going for about a week.  Best contact [phone]       Message received & copied from [facility]

## 2018-05-07 NOTE — TELEPHONE ENCOUNTER
Pt is requesting a call back concerning a sore throat, stated this is the 2nd time trying to get a call back.  Best contact [phone]     Message received & copied from [facility]

## 2018-05-08 NOTE — TELEPHONE ENCOUNTER
----- Message from [employer] sent at 5/7/2018  4:39 PM EDT -----  Regarding: Dr. Sarah Kendall returning a call she received. She stated no message was left so she isn't sure who called but knows it was in regards to her voice. Contact [phone].       Message copied/pasted from [facility]

## 2018-05-08 NOTE — TELEPHONE ENCOUNTER
Spoke with patient after 2 patient identifiers being note and she advised that she has a soar throat, cough, non productive, nasal congestion that seems to be stuck in her head. She has been taking OTC cold and cough meds but they are not working. Wants to know if something can be called in, or if MD needs to see her, I advised I would send to MD for advisement. Patient expressed understanding and has no further questions at this time.

## 2018-05-08 NOTE — TELEPHONE ENCOUNTER
Spoke with patient after 2 patient identifiers being note and advised that she needed to be seen, offered appt on Friday, May 11, 2018 10:30 AM, patient accepted. Patient expressed understanding and has no further questions at this time.

## 2018-05-11 ENCOUNTER — OFFICE VISIT (OUTPATIENT)
Dept: INTERNAL MEDICINE CLINIC | Age: 48
End: 2018-05-11

## 2018-05-11 VITALS
DIASTOLIC BLOOD PRESSURE: 66 MMHG | BODY MASS INDEX: 30.99 KG/M2 | WEIGHT: 186 LBS | OXYGEN SATURATION: 94 % | SYSTOLIC BLOOD PRESSURE: 105 MMHG | HEART RATE: 94 BPM | TEMPERATURE: 97.8 F | RESPIRATION RATE: 16 BRPM | HEIGHT: 65 IN

## 2018-05-11 DIAGNOSIS — J01.10 ACUTE NON-RECURRENT FRONTAL SINUSITIS: Primary | ICD-10-CM

## 2018-05-11 PROBLEM — E11.21 TYPE 2 DIABETES WITH NEPHROPATHY (HCC): Status: RESOLVED | Noted: 2018-04-24 | Resolved: 2018-05-11

## 2018-05-11 PROBLEM — E10.21 DIABETIC NEPHROPATHY ASSOCIATED WITH TYPE 1 DIABETES MELLITUS (HCC): Chronic | Status: ACTIVE | Noted: 2018-05-11

## 2018-05-11 RX ORDER — ACETAMINOPHEN 325 MG/1
650 TABLET ORAL
Qty: 30 TAB | Refills: 0 | Status: SHIPPED | OUTPATIENT
Start: 2018-05-11 | End: 2019-06-20

## 2018-05-11 RX ORDER — TIZANIDINE 4 MG/1
TABLET ORAL
COMMUNITY
Start: 2018-04-23 | End: 2018-07-24

## 2018-05-11 RX ORDER — IBUPROFEN 200 MG
1 TABLET ORAL EVERY 24 HOURS
Qty: 30 PATCH | Refills: 0 | Status: SHIPPED | OUTPATIENT
Start: 2018-05-11 | End: 2018-06-10

## 2018-05-11 RX ORDER — FLUTICASONE PROPIONATE 50 MCG
2 SPRAY, SUSPENSION (ML) NASAL DAILY
Qty: 1 BOTTLE | Refills: 1 | Status: SHIPPED | OUTPATIENT
Start: 2018-05-11 | End: 2018-07-24

## 2018-05-11 RX ORDER — LIDOCAINE 50 MG/G
PATCH TOPICAL
COMMUNITY
Start: 2018-04-25 | End: 2018-07-24

## 2018-05-11 RX ORDER — ARIPIPRAZOLE 10 MG/1
10 TABLET ORAL
COMMUNITY
End: 2018-07-24

## 2018-05-11 RX ORDER — AMOXICILLIN AND CLAVULANATE POTASSIUM 875; 125 MG/1; MG/1
1 TABLET, FILM COATED ORAL EVERY 12 HOURS
Qty: 20 TAB | Refills: 0 | Status: SHIPPED | OUTPATIENT
Start: 2018-05-11 | End: 2018-05-21

## 2018-05-11 NOTE — PROGRESS NOTES
Reviewed record in preparation for visit and have obtained necessary documentation. Identified pt with two pt identifiers(name and ). Chief Complaint   Patient presents with    Hoarse     onset x 1 week    Cough     Non productive cough, when productive thick green mucus    Nasal Congestion     stuffy nose, SOB    Ear Pain     bilateral ear pain       Health Maintenance Due   Topic Date Due    Annual Well Visit  2018       Ms. Lisa Moreno has a reminder for a \"due or due soon\" health maintenance. I have asked that she discuss this further with her primary care provider for follow-up on this health maintenance. Coordination of Care Questionnaire:  :     1) Have you been to an emergency room, urgent care clinic since your last visit? no   Hospitalized since your last visit? no             2) Have you seen or consulted any other health care providers outside of [address] [place] [address] since your last visit? no  (Include any pap smears or colon screenings in this section.)    3) In the event something were to happen to you and you were unable to speak on your behalf, do you have an Advance Directive/ Living Will in place stating your wishes? NO    Do you have an Advance Directive on file? no    4) Are you interested in receiving information on Advance Directives? YES    Patient is accompanied by self I have received verbal consent from Raine Tomlinson to discuss any/all medical information while they are present in the room.

## 2018-05-11 NOTE — PATIENT INSTRUCTIONS
Saline Nasal Washes: Care Instructions  Your Care Instructions  Saline nasal washes help keep the nasal passages open by washing out thick or dried mucus. This simple remedy can help relieve symptoms of allergies, sinusitis, and colds. It also can make the nose feel more comfortable by keeping the mucous membranes moist. You may notice a little burning sensation in your nose the first few times you use the solution, but this usually gets better in a few days. Follow-up care is a key part of your treatment and safety. Be sure to make and go to all appointments, and call your doctor if you are having problems. It's also a good idea to know your test results and keep a list of the medicines you take. How can you care for yourself at home? · You can buy premixed saline solution in a squeeze bottle or other sinus rinse products at a drugstore. Read and follow the instructions on the label. · You also can make your own saline solution by adding 1 teaspoon of salt and 1 teaspoon of baking soda to 2 cups of distilled water. · If you use a homemade solution, pour a small amount into a clean bowl. Using a rubber bulb syringe, squeeze the syringe and place the tip in the salt water. Pull a small amount of the salt water into the syringe by relaxing your hand. · Sit down with your head tilted slightly back. Do not lie down. Put the tip of the bulb syringe or the squeeze bottle a little way into one of your nostrils. Gently drip or squirt a few drops into the nostril. Repeat with the other nostril. Some sneezing and gagging are normal at first.  · Gently blow your nose. · Wipe the syringe or bottle tip clean after each use. · Repeat this 2 or 3 times a day. · Use nasal washes gently if you have nosebleeds often. When should you call for help? Watch closely for changes in your health, and be sure to contact your doctor if:  ? · You often get nosebleeds. ? · You have problems doing the nasal washes.    Where can you learn more? Go to http://mac-rufus.info/. Enter [id number] in the search box to learn more about \"Saline Nasal Washes: Care Instructions. \"  Current as of: May 12, 2017  Content Version: 11.4  © 2047-8739 [employer]. Care instructions adapted under license by [employer] (which disclaims liability or warranty for this information). If you have questions about a medical condition or this instruction, always ask your healthcare professional. [address] any warranty or liability for your use of this information. Sinusitis: Care Instructions  Your Care Instructions    Sinusitis is an infection of the lining of the sinus cavities in your head. Sinusitis often follows a cold. It causes pain and pressure in your head and face. In most cases, sinusitis gets better on its own in 1 to 2 weeks. But some mild symptoms may last for several weeks. Sometimes antibiotics are needed. Follow-up care is a key part of your treatment and safety. Be sure to make and go to all appointments, and call your doctor if you are having problems. It's also a good idea to know your test results and keep a list of the medicines you take. How can you care for yourself at home? · Take an over-the-counter pain medicine, such as acetaminophen (Tylenol), ibuprofen (Advil, Motrin), or naproxen (Aleve). Read and follow all instructions on the label. · If the doctor prescribed antibiotics, take them as directed. Do not stop taking them just because you feel better. You need to take the full course of antibiotics. · Be careful when taking over-the-counter cold or flu medicines and Tylenol at the same time. Many of these medicines have acetaminophen, which is Tylenol. Read the labels to make sure that you are not taking more than the recommended dose. Too much acetaminophen (Tylenol) can be harmful.   · Breathe warm, moist air from a steamy shower, a hot bath, or a sink filled with hot water. Avoid cold, dry air. Using a humidifier in your home may help. Follow the directions for cleaning the machine. · Use saline (saltwater) nasal washes to help keep your nasal passages open and wash out mucus and bacteria. You can buy saline nose drops at a grocery store or drugstore. Or you can make your own at home by adding 1 teaspoon of salt and 1 teaspoon of baking soda to 2 cups of distilled water. If you make your own, fill a bulb syringe with the solution, insert the tip into your nostril, and squeeze gently. [place] Carp your nose. · Put a hot, wet towel or a warm gel pack on your face 3 or 4 times a day for 5 to 10 minutes each time. · Try a decongestant nasal spray like oxymetazoline (Afrin). Do not use it for more than 3 days in a row. Using it for more than 3 days can make your congestion worse. When should you call for help? Call your doctor now or seek immediate medical care if:  ? · You have new or worse swelling or redness in your face or around your eyes. ? · You have a new or higher fever. ? Watch closely for changes in your health, and be sure to contact your doctor if:  ? · You have new or worse facial pain. ? · The mucus from your nose becomes thicker (like pus) or has new blood in it. ? · You are not getting better as expected. Where can you learn more? Go to http://mac-rufus.info/. Enter R042 in the search box to learn more about \"Sinusitis: Care Instructions. \"  Current as of: May 12, 2017  Content Version: 11.4  © 5118-8839 [employer]. Care instructions adapted under license by [employer] (which disclaims liability or warranty for this information). If you have questions about a medical condition or this instruction, always ask your healthcare professional. [address] any warranty or liability for your use of this information.        Stopping Smoking: Care Instructions  Your Care Instructions    Cigarette smokers crave the nicotine in cigarettes. Giving it up is much harder than simply changing a habit. Your body has to stop craving the nicotine. It is hard to quit, but you can do it. There are many tools that people use to quit smoking. You may find that combining tools works best for you. There are several steps to quitting. First you get ready to quit. Then you get support to help you. After that, you learn new skills and behaviors to become a nonsmoker. For many people, a necessary step is getting and using medicine. Your doctor will help you set up the plan that best meets your needs. You may want to attend a smoking cessation program to help you quit smoking. When you choose a program, look for one that has proven success. Ask your doctor for ideas. You will greatly increase your chances of success if you take medicine as well as get counseling or join a cessation program.  Some of the changes you feel when you first quit tobacco are uncomfortable. Your body will miss the nicotine at first, and you may feel short-tempered and grumpy. You may have trouble sleeping or concentrating. Medicine can help you deal with these symptoms. You may struggle with changing your smoking habits and rituals. The last step is the tricky one: Be prepared for the smoking urge to continue for a time. This is a lot to deal with, but keep at it. You will feel better. Follow-up care is a key part of your treatment and safety. Be sure to make and go to all appointments, and call your doctor if you are having problems. It's also a good idea to know your test results and keep a list of the medicines you take. How can you care for yourself at home? · Ask your family, friends, and coworkers for support. You have a better chance of quitting if you have help and support. · Join a support group, such as Nicotine Anonymous, for people who are trying to quit smoking.   · Consider signing up for a smoking cessation program, such as the American [employer] Association's Freedom from Smoking program.  · Set a quit date. Pick your date carefully so that it is not right in the middle of a big deadline or stressful time. Once you quit, do not even take a puff. Get rid of all ashtrays and lighters after your last cigarette. Clean your house and your clothes so that they do not smell of smoke. · Learn how to be a nonsmoker. Think about ways you can avoid those things that make you reach for a cigarette. ¨ Avoid situations that put you at greatest risk for smoking. For some people, it is hard to have a drink with friends without smoking. For others, they might skip a coffee break with coworkers who smoke. ¨ Change your daily routine. Take a different route to work or eat a meal in a different place. · Cut down on stress. Calm yourself or release tension by doing an activity you enjoy, such as reading a book, taking a hot bath, or gardening. · Talk to your doctor or pharmacist about nicotine replacement therapy, which replaces the nicotine in your body. You still get nicotine but you do not use tobacco. Nicotine replacement products help you slowly reduce the amount of nicotine you need. These products come in several forms, many of them available over-the-counter:  ¨ Nicotine patches  ¨ Nicotine gum and lozenges  ¨ Nicotine inhaler  · Ask your doctor about bupropion (Wellbutrin) or varenicline (Chantix), which are prescription medicines. They do not contain nicotine. They help you by reducing withdrawal symptoms, such as stress and anxiety. · Some people find hypnosis, acupuncture, and massage helpful for ending the smoking habit. · Eat a healthy diet and get regular exercise. Having healthy habits will help your body move past its craving for nicotine. · Be prepared to keep trying. Most people are not successful the first few times they try to quit. Do not get mad at yourself if you smoke again.  Make a list of things you learned and think about when you want to try again, such as next week, next month, or next year. Where can you learn more? Go to http://mac-rufus.info/. Enter L457 in the search box to learn more about \"Stopping Smoking: Care Instructions. \"  Current as of: March 20, 2017  Content Version: 11.4  © 7917-7060 [employer]. Care instructions adapted under license by [employer] (which disclaims liability or warranty for this information). If you have questions about a medical condition or this instruction, always ask your healthcare professional. [place] [address] any warranty or liability for your use of this information.

## 2018-05-11 NOTE — MR AVS SNAPSHOT
[address] [place] [address] 
[phone] Patient: Anton Dawn MRN: [id number] PDW:[id number] Visit Information Date & Time Provider Department Dept. Phone Encounter #  
 5/11/2018 10:30 AM Jerry Norman, [address] [facility] [address] [phone] [id number] Follow-up Instructions Return in about 3 months (around 8/11/2018). Your Appointments 7/24/2018  9:30 AM  
Follow Up with MD Tomasz Vazquezmond Diabetes and Endocrinology [facility]) [address] [phone] [address] [place] [address] Upcoming Health Maintenance Date Due  
 MEDICARE YEARLY EXAM 4/12/2018 Influenza Age 5 to Adult 8/1/2018 FOOT EXAM Q1 9/28/2018 HEMOGLOBIN A1C Q6M 10/24/2018 Pneumococcal 19-64 Highest Risk (3 of 3 - PCV13) 11/14/2018 EYE EXAM RETINAL OR DILATED Q1 3/27/2019 MICROALBUMIN Q1 4/24/2019 LIPID PANEL Q1 4/24/2019 PAP AKA CERVICAL CYTOLOGY 10/9/2020 DTaP/Tdap/Td series (2 - Td) 12/15/2027 Allergies as of 5/11/2018  Review Complete On: 5/11/2018 By: Sana Mahoney III, DO Severity Noted Reaction Type Reactions Zocor [Simvastatin] Medium 11/20/2009   Intolerance Myalgia Lisinopril  05/26/2011    Cough Current Immunizations  Never Reviewed Name Date Influenza Vaccine (Quad) PF 11/14/2017 Influenza Vaccine Split 10/1/2012 Pneumococcal Polysaccharide (PPSV-23) 11/14/2017 Tdap 12/15/2017 Not reviewed this visit You Were Diagnosed With   
  
 Codes Comments Acute non-recurrent frontal sinusitis    -  Primary ICD-10-CM: J01.10 ICD-9-CM: 403.2 Vitals BP Pulse Temp Resp Height(growth percentile) Weight(growth percentile)  105/66 (BP 1 Location: Left arm, BP Patient Position: Sitting) 94 97.8 °F (36.6 °C) (Oral) 16 5' 5\" (1.651 m) 186 lb (84.4 kg) SpO2 BMI OB Status Smoking Status 94% 30.95 kg/m2 Implant Current Every Day Smoker BMI and BSA Data Body Mass Index Body Surface Area 30.95 kg/m 2 1.97 m 2 Preferred Pharmacy Pharmacy Name Phone [facility] [address], [address] [facility] [phone] Your Updated Medication List  
  
   
This list is accurate as of 5/11/18 11:01 AM.  Always use your most recent med list.  
  
  
  
  
 acetaminophen 325 mg tablet Commonly known as:  TYLENOL Take 2 Tabs by mouth every six (6) hours as needed for Fever. albuterol 90 mcg/actuation inhaler Commonly known as:  PROVENTIL HFA, VENTOLIN HFA, PROAIR HFA Take 1 Puff by inhalation every four (4) hours as needed for Wheezing. ALPRAZolam 0.5 mg tablet Commonly known as:  Lollie Sumit Take 1 Tab by mouth three (3) times daily as needed for Anxiety. Max Daily Amount: 1.5 mg.  
  
 amoxicillin-clavulanate 875-125 mg per tablet Commonly known as:  AUGMENTIN Take 1 Tab by mouth every twelve (12) hours for 10 days. ARIPiprazole 10 mg tablet Commonly known as:  ABILIFY Take 10 mg by mouth. atorvastatin 40 mg tablet Commonly known as:  LIPITOR Take 1 Tab by mouth daily. carvedilol 25 mg tablet Commonly known as:  Tehama Pennant Take 0.5 Tabs by mouth two (2) times daily (with meals). EFFEXOR  mg capsule Generic drug:  venlafaxine-SR Take 150 mg by mouth two (2) times a day. fluticasone 50 mcg/actuation nasal spray Commonly known as:  Pramod Alejandra 2 Sprays by Both Nostrils route daily. furosemide 40 mg tablet Commonly known as:  LASIX  
2 tablets twice daily--Dose change 2/19/18--updated med list--did not send prescription to the pharmacy  
  
 gabapentin 300 mg capsule Commonly known as:  NEURONTIN Take 2 Caps by mouth three (3) times daily. glucagon 1 mg injection Commonly known as:  GLUCAGON EMERGENCY KIT (HUMAN) Use as directed HumaLOG KwikPen Insulin 100 unit/mL kwikpen Generic drug:  insulin lispro 1:15 for carbs and 1:30 > 150 for correction during the day and > 180 at bedtime. Max 50 units per day  
  
 insulin aspart U-100 100 unit/mL Inpn Commonly known as:  NovoLOG Flexpen U-100 Insulin 1:15 for carbs and 1:30 > 150 for correction during the day and > 180 at bedtime. Max 50 units per day--replaces humalog  
  
 insulin glargine 100 unit/mL (3 mL) Inpn Commonly known as:  LANTUS SOLOSTAR U-100 INSULIN  
32 Units by SubCUTAneous route daily. Dose change 4/24/18--updated med list--did not send prescription to the pharmacy Insulin Needles (Disposable) 31 gauge x 3/16\" Ndle Commonly known as:  BD ULTRA-FINE MINI PEN NEEDLE Use as directed up to 4 times daily LIDODERM 5 % Generic drug:  lidocaine APPLY ONE PATCH DAILY. REMOVE AND DISCARD PATCH WITHIN 12 HOURS OR AS DIRECTED BY MD  
  
 magnesium oxide 400 mg tablet Commonly known as:  MAG-OX Take 1 Tab by mouth daily. metOLazone 2.5 mg tablet Commonly known as:  Iveth Honeoye Take 1 Tab by mouth as needed. NexIUM 40 mg capsule Generic drug:  esomeprazole Take  by mouth daily. nicotine 21 mg/24 hr  
Commonly known as:  NICODERM CQ  
1 Patch by TransDERmal route every twenty-four (24) hours for 30 days. One Touch Delica 33 gauge Misc Generic drug:  lancets Test 8 times daily--DX E10.65  
  
 potassium chloride 20 mEq tablet Commonly known as:  K-DUR, KLOR-CON Take 1 tab bid  
  
 tiZANidine 4 mg tablet Commonly known as:  Yoandy Avilaener TAKE ONE TABLET BY MOUTH EVERY 6 HOURS AS NEEDED FOR MUSCLE SPASM  
  
 varenicline 0.5 mg (11)- 1 mg (42) Dspk Commonly known as:  CHANTIX STARTER DELILAH Take as instructed. Let me know when you need the full dose prescription. Prescriptions Sent to Pharmacy Refills amoxicillin-clavulanate (AUGMENTIN) 875-125 mg per tablet 0 Sig: Take 1 Tab by mouth every twelve (12) hours for 10 days. Class: Normal  
 Pharmacy: [address] Ph #: [phone] Route: Oral  
 nicotine (NICODERM CQ) 21 mg/24 hr 0 Si Patch by TransDERmal route every twenty-four (24) hours for 30 days. Class: Normal  
 Pharmacy: [address] Ph #: [phone] Route: TransDERmal  
 fluticasone (FLONASE) 50 mcg/actuation nasal spray 1 Si Sprays by Both Nostrils route daily. Class: Normal  
 Pharmacy: [address] Ph #: [phone] Route: Both Nostrils  
 acetaminophen (TYLENOL) 325 mg tablet 0 Sig: Take 2 Tabs by mouth every six (6) hours as needed for Fever. Class: Normal  
 Pharmacy: [address] Ph #: [phone] Route: Oral  
  
Follow-up Instructions Return in about 3 months (around 2018). Patient Instructions Saline Nasal Washes: Care Instructions Your Care Instructions Saline nasal washes help keep the nasal passages open by washing out thick or dried mucus. This simple remedy can help relieve symptoms of allergies, sinusitis, and colds. It also can make the nose feel more comfortable by keeping the mucous membranes moist. You may notice a little burning sensation in your nose the first few times you use the solution, but this usually gets better in a few days. Follow-up care is a key part of your treatment and safety. Be sure to make and go to all appointments, and call your doctor if you are having problems. It's also a good idea to know your test results and keep a list of the medicines you take. How can you care for yourself at home?  
· You can buy premixed saline solution in a squeeze bottle or other sinus rinse products at a drugstore. Read and follow the instructions on the label. · You also can make your own saline solution by adding 1 teaspoon of salt and 1 teaspoon of baking soda to 2 cups of distilled water. · If you use a homemade solution, pour a small amount into a clean bowl. Using a rubber bulb syringe, squeeze the syringe and place the tip in the salt water. Pull a small amount of the salt water into the syringe by relaxing your hand. · Sit down with your head tilted slightly back. Do not lie down. Put the tip of the bulb syringe or the squeeze bottle a little way into one of your nostrils. Gently drip or squirt a few drops into the nostril. Repeat with the other nostril. Some sneezing and gagging are normal at first. 
· Gently blow your nose. · Wipe the syringe or bottle tip clean after each use. · Repeat this 2 or 3 times a day. · Use nasal washes gently if you have nosebleeds often. When should you call for help? Watch closely for changes in your health, and be sure to contact your doctor if: 
? · You often get nosebleeds. ? · You have problems doing the nasal washes. Where can you learn more? Go to http://mac-rufus.info/. Enter [id number] in the search box to learn more about \"Saline Nasal Washes: Care Instructions. \" Current as of: May 12, 2017 Content Version: 11.4 © 6914-8318 [employer]. Care instructions adapted under license by [employer] (which disclaims liability or warranty for this information). If you have questions about a medical condition or this instruction, always ask your healthcare professional. [place] [address] any warranty or liability for your use of this information. Sinusitis: Care Instructions Your Care Instructions Sinusitis is an infection of the lining of the sinus cavities in your head. Sinusitis often follows a cold. It causes pain and pressure in your head and face. In most cases, sinusitis gets better on its own in 1 to 2 weeks. But some mild symptoms may last for several weeks. Sometimes antibiotics are needed. Follow-up care is a key part of your treatment and safety. Be sure to make and go to all appointments, and call your doctor if you are having problems. It's also a good idea to know your test results and keep a list of the medicines you take. How can you care for yourself at home? · Take an over-the-counter pain medicine, such as acetaminophen (Tylenol), ibuprofen (Advil, Motrin), or naproxen (Aleve). Read and follow all instructions on the label. · If the doctor prescribed antibiotics, take them as directed. Do not stop taking them just because you feel better. You need to take the full course of antibiotics. · Be careful when taking over-the-counter cold or flu medicines and Tylenol at the same time. Many of these medicines have acetaminophen, which is Tylenol. Read the labels to make sure that you are not taking more than the recommended dose. Too much acetaminophen (Tylenol) can be harmful. · Breathe warm, moist air from a steamy shower, a hot bath, or a sink filled with hot water. Avoid cold, dry air. Using a humidifier in your home may help. Follow the directions for cleaning the machine. · Use saline (saltwater) nasal washes to help keep your nasal passages open and wash out mucus and bacteria. You can buy saline nose drops at a grocery store or drugstore. Or you can make your own at home by adding 1 teaspoon of salt and 1 teaspoon of baking soda to 2 cups of distilled water. If you make your own, fill a bulb syringe with the solution, insert the tip into your nostril, and squeeze gently. Madeline Rummage your nose. · Put a hot, wet towel or a warm gel pack on your face 3 or 4 times a day for 5 to 10 minutes each time. · Try a decongestant nasal spray like oxymetazoline (Afrin).  Do not use it for more than 3 days in a row. Using it for more than 3 days can make your congestion worse. When should you call for help? Call your doctor now or seek immediate medical care if: 
? · You have new or worse swelling or redness in your face or around your eyes. ? · You have a new or higher fever. ? Watch closely for changes in your health, and be sure to contact your doctor if: 
? · You have new or worse facial pain. ? · The mucus from your nose becomes thicker (like pus) or has new blood in it. ? · You are not getting better as expected. Where can you learn more? Go to http://mac-rufus.info/. Enter H068 in the search box to learn more about \"Sinusitis: Care Instructions. \" Current as of: May 12, 2017 Content Version: 11.4 © 1193-8279 [employer]. Care instructions adapted under license by [employer] (which disclaims liability or warranty for this information). If you have questions about a medical condition or this instruction, always ask your healthcare professional. [place] [address] any warranty or liability for your use of this information. Stopping Smoking: Care Instructions Your Care Instructions Cigarette smokers crave the nicotine in cigarettes. Giving it up is much harder than simply changing a habit. Your body has to stop craving the nicotine. It is hard to quit, but you can do it. There are many tools that people use to quit smoking. You may find that combining tools works best for you. There are several steps to quitting. First you get ready to quit. Then you get support to help you. After that, you learn new skills and behaviors to become a nonsmoker. For many people, a necessary step is getting and using medicine. Your doctor will help you set up the plan that best meets your needs. You may want to attend a smoking cessation program to help you quit smoking. When you choose a program, look for one that has proven success. Ask your doctor for ideas. You will greatly increase your chances of success if you take medicine as well as get counseling or join a cessation program. 
Some of the changes you feel when you first quit tobacco are uncomfortable. Your body will miss the nicotine at first, and you may feel short-tempered and grumpy. You may have trouble sleeping or concentrating. Medicine can help you deal with these symptoms. You may struggle with changing your smoking habits and rituals. The last step is the tricky one: Be prepared for the smoking urge to continue for a time. This is a lot to deal with, but keep at it. You will feel better. Follow-up care is a key part of your treatment and safety. Be sure to make and go to all appointments, and call your doctor if you are having problems. It's also a good idea to know your test results and keep a list of the medicines you take. How can you care for yourself at home? · Ask your family, friends, and coworkers for support. You have a better chance of quitting if you have help and support. · Join a support group, such as Nicotine Anonymous, for people who are trying to quit smoking. · Consider signing up for a smoking cessation program, such as the American Lung Association's Freedom from Smoking program. 
· Set a quit date. Pick your date carefully so that it is not right in the middle of a big deadline or stressful time. Once you quit, do not even take a puff. Get rid of all ashtrays and lighters after your last cigarette. Clean your house and your clothes so that they do not smell of smoke. · Learn how to be a nonsmoker. Think about ways you can avoid those things that make you reach for a cigarette. ¨ Avoid situations that put you at greatest risk for smoking. For some people, it is hard to have a drink with friends without smoking. For others, they might skip a coffee break with coworkers who smoke. ¨ Change your daily routine. Take a different route to work or eat a meal in a different place. · Cut down on stress. Calm yourself or release tension by doing an activity you enjoy, such as reading a book, taking a hot bath, or gardening. · Talk to your doctor or pharmacist about nicotine replacement therapy, which replaces the nicotine in your body. You still get nicotine but you do not use tobacco. Nicotine replacement products help you slowly reduce the amount of nicotine you need. These products come in several forms, many of them available over-the-counter: ¨ Nicotine patches ¨ Nicotine gum and lozenges ¨ Nicotine inhaler · Ask your doctor about bupropion (Wellbutrin) or varenicline (Chantix), which are prescription medicines. They do not contain nicotine. They help you by reducing withdrawal symptoms, such as stress and anxiety. · Some people find hypnosis, acupuncture, and massage helpful for ending the smoking habit. · Eat a healthy diet and get regular exercise. Having healthy habits will help your body move past its craving for nicotine. · Be prepared to keep trying. Most people are not successful the first few times they try to quit. Do not get mad at yourself if you smoke again. Make a list of things you learned and think about when you want to try again, such as next week, next month, or next year. Where can you learn more? Go to http://mac-rufus.info/. Enter I475 in the search box to learn more about \"Stopping Smoking: Care Instructions. \" Current as of: March 20, 2017 Content Version: 11.4 © 3995-3419 [employer]. Care instructions adapted under license by [employer] (which disclaims liability or warranty for this information). If you have questions about a medical condition or this instruction, always ask your healthcare professional. [address] any warranty or liability for your use of this information. Introducing [address]! Dear Kathryn Delgado: 
Thank you for requesting a GetSocial account. Our records indicate that you already have an active GetSocial account. You can access your account anytime at [email or web address] Did you know that you can access your hospital and ER discharge instructions at any time in GetSocial? You can also review all of your test results from your hospital stay or ER visit. Additional Information If you have questions, please visit the Frequently Asked Questions section of the GetSocial website at [email or web address] Remember, GetSocial is NOT to be used for urgent needs. For medical emergencies, dial 911. Now available from your iPhone and Android! Please provide this summary of care documentation to your next provider. Your primary care clinician is listed as Huang Valdes If you have any questions after today's visit, please call [phone].

## 2018-05-11 NOTE — PROGRESS NOTES
Rose Arce is a 53 y.o. female who presents for evaluation of sick visit. Last seen by me feb 19, 2018. Been struggling with sinus congestion, cough for past 10 days or so. No f/c. Has photo on her phone of some of her nasal discharge, thick and green. ROS:  Constitutional: negative for fevers, chills, anorexia and weight loss  Eyes:   negative for visual disturbance and irritation  ENT:   negative for tinnitus,sore throat,ear pain,hoarseness.   ++nasal congestion  Respiratory:  negative for  hemoptysis, dyspnea,wheezing.  ++cough  CV:   negative for chest pain, palpitations, lower extremity edema  GI:   negative for nausea, vomiting, diarrhea, abdominal pain,melena  Genitourinary: negative for frequency, dysuria and hematuria  Musculoskel: negative for myalgias, arthralgias, back pain, muscle weakness, joint pain  Neurological:  negative for headaches, dizziness, focal weakness, numbness  Psychiatric:     Negative for depression or anxiety      Past Medical History:   Diagnosis Date    Achilles tendon rupture     along with torn right patellar/femoral ligament    Arthritis     rt. foot    Chronic pain     abdominal pain    Diabetes (HCC)     IDDM on insulin pump and sensor    DM type 1 (diabetes mellitus, type 1) ([facility] [address]     age 25    Endometriosis     s/p ex-lap 4x    Gastric ulcer     GERD (gastroesophageal reflux disease)     Herpes     Other and unspecified hyperlipidemia     Panic attacks     Psychiatric disorder     anxiety, panic attacks    PTSD (post-traumatic stress disorder)     Unspecified essential hypertension        Past Surgical History:   Procedure Laterality Date    HX GYN      2 d&c's    HX GYN      laparoscopies x3    HX ORTHOPAEDIC  2002    achiles tendon repair,talus repair    HX ORTHOPAEDIC      injections x2 to right shoulder       Family History   Problem Relation Age of Onset    Diabetes Mother      diet controlled    Diabetes Father      R foot amupation    Liver Disease Father     Heart Disease Paternal Uncle      MI in his 46s    Stroke Neg Hx        Social History     Social History    Marital status: SINGLE     Spouse name: N/A    Number of children: N/A    Years of education: N/A     Occupational History    Not on file. Social History Main Topics    Smoking status: Current Every Day Smoker     Packs/day: 1.50     Years: 17.00     Types: Cigarettes     Last attempt to quit: 11/28/2017    Smokeless tobacco: Never Used      Comment: Trying to quit    Alcohol use No      Comment: a beer every few months    Drug use: No    Sexual activity: Yes     Partners: Male     Other Topics Concern    Not on file     Social History Narrative            Visit Vitals    /66 (BP 1 Location: Left arm, BP Patient Position: Sitting)    Pulse 94    Temp 97.8 °F (36.6 °C) (Oral)    Resp 16    Ht 5' 5\" (1.651 m)    Wt 186 lb (84.4 kg)    SpO2 94%    BMI 30.95 kg/m2       Physical Examination:   General - Well appearing female  HEENT - PERRL, TM no erythema/opacification, normal nasal turbinates, no oropharyngeal erythema or exudate, MMM  Neck - supple, no bruits, no thyroidomegaly, no lymphadenopathy  Pulm - clear to auscultation bilaterally  Cardio - RRR, normal S1 S2, no murmur  Abd - soft, nontender, no masses, no HSM  Extrem - no edema, +2 distal pulses  Neuro-  No focal deficits, CN intact     Assessment/Plan:    1. Acute sinusitis--rx for augmentin and flonase. 2.  Tobacco abuse--rx for patches  3. Dm, type 1--last a1c down to 8.2, on lantus and novolog  4. Anxiety and depression--improving with abilify, effexor  5.  htn--controlled with coreg.   Not currently on acei/arb  6.  hyperlipids--LDL 42, on lipitor    rtc 3 months        Michael Palacio III, DO

## 2018-05-29 ENCOUNTER — APPOINTMENT (OUTPATIENT)
Dept: GENERAL RADIOLOGY | Age: 48
End: 2018-05-29
Attending: PHYSICIAN ASSISTANT
Payer: MEDICARE

## 2018-05-29 ENCOUNTER — HOSPITAL ENCOUNTER (EMERGENCY)
Age: 48
Discharge: HOME | End: 2018-05-29
Attending: EMERGENCY MEDICINE
Payer: MEDICARE

## 2018-05-29 VITALS
RESPIRATION RATE: 15 BRPM | HEART RATE: 78 BPM | OXYGEN SATURATION: 100 % | TEMPERATURE: 97.4 F | WEIGHT: 186.51 LBS | BODY MASS INDEX: 31.07 KG/M2 | SYSTOLIC BLOOD PRESSURE: 156 MMHG | HEIGHT: 65 IN | DIASTOLIC BLOOD PRESSURE: 83 MMHG

## 2018-05-29 DIAGNOSIS — S89.91XA INJURY OF RIGHT KNEE, INITIAL ENCOUNTER: Primary | ICD-10-CM

## 2018-05-29 DIAGNOSIS — F11.20 NARCOTIC DEPENDENCE (HCC): ICD-10-CM
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
## 2018-05-29 DIAGNOSIS — F13.20 BENZODIAZEPINE DEPENDENCE (HCC): ICD-10-CM

## 2018-05-29 DIAGNOSIS — Z23 NEED FOR TETANUS BOOSTER: ICD-10-CM

## 2018-05-29 DIAGNOSIS — S90.511A ABRASION OF RIGHT ANKLE WITHOUT INFECTION, INITIAL ENCOUNTER: ICD-10-CM

## 2018-05-29 LAB
ANION GAP BLD CALC-SCNC: 14 MMOL/L (ref 10–20)
BUN BLD-MCNC: 24 MG/DL (ref 9–20)
CA-I BLD-MCNC: 1.09 MMOL/L (ref 1.12–1.32)
CHLORIDE BLD-SCNC: 90 MMOL/L (ref 98–107)
CO2 BLD-SCNC: 33 MMOL/L (ref 21–32)
CREAT BLD-MCNC: 1 MG/DL (ref 0.6–1.3)
GLUCOSE BLD-MCNC: 195 MG/DL (ref 65–100)
HCT VFR BLD CALC: 41 % (ref 35–47)
POTASSIUM BLD-SCNC: 4.1 MMOL/L (ref 3.5–5.1)
SERVICE CMNT-IMP: ABNORMAL
SODIUM BLD-SCNC: 132 MMOL/L (ref 136–145)

## 2018-05-29 PROCEDURE — 90471 IMMUNIZATION ADMIN: CPT

## 2018-05-29 PROCEDURE — 74011250636 HC RX REV CODE- 250/636: Performed by: PHYSICIAN ASSISTANT

## 2018-05-29 PROCEDURE — 74011250637 HC RX REV CODE- 250/637: Performed by: PHYSICIAN ASSISTANT

## 2018-05-29 PROCEDURE — L1830 KO IMMOB CANVAS LONG PRE OTS: HCPCS

## 2018-05-29 PROCEDURE — 99284 EMERGENCY DEPT VISIT MOD MDM: CPT

## 2018-05-29 PROCEDURE — 80047 BASIC METABLC PNL IONIZED CA: CPT

## 2018-05-29 PROCEDURE — 90715 TDAP VACCINE 7 YRS/> IM: CPT | Performed by: PHYSICIAN ASSISTANT

## 2018-05-29 PROCEDURE — 73562 X-RAY EXAM OF KNEE 3: CPT

## 2018-05-29 RX ORDER — VARENICLINE TARTRATE 1 MG/1
TABLET, FILM COATED ORAL
Qty: 56 TAB | Refills: 0 | Status: SHIPPED | OUTPATIENT
Start: 2018-05-29 | End: 2018-07-24

## 2018-05-29 RX ORDER — HYDROCODONE BITARTRATE AND ACETAMINOPHEN 10; 325 MG/1; MG/1
1 TABLET ORAL
COMMUNITY
End: 2019-03-01

## 2018-05-29 RX ORDER — HYDROCODONE BITARTRATE AND ACETAMINOPHEN 5; 325 MG/1; MG/1
1 TABLET ORAL
Status: COMPLETED | OUTPATIENT
Start: 2018-05-29 | End: 2018-05-29

## 2018-05-29 RX ADMIN — TETANUS TOXOID, REDUCED DIPHTHERIA TOXOID AND ACELLULAR PERTUSSIS VACCINE, ADSORBED 0.5 ML: 5; 2.5; 8; 8; 2.5 SUSPENSION INTRAMUSCULAR at 10:19

## 2018-05-29 RX ADMIN — HYDROCODONE BITARTRATE AND ACETAMINOPHEN 1 TABLET: 5; 325 TABLET ORAL at 10:19

## 2018-05-29 NOTE — DISCHARGE INSTRUCTIONS
Knee Pain or Injury: Care Instructions  Your Care Instructions    Injuries are a common cause of knee problems. Sudden (acute) injuries may be caused by a direct blow to the knee. They can also be caused by abnormal twisting, bending, or falling on the knee. Pain, bruising, or swelling may be severe, and may start within minutes of the injury. Overuse is another cause of knee pain. Other causes are climbing stairs, kneeling, and other activities that use the knee. Everyday wear and tear, especially as you get older, also can cause knee pain. Rest, along with home treatment, often relieves pain and allows your knee to heal. If you have a serious knee injury, you may need tests and treatment. Follow-up care is a key part of your treatment and safety. Be sure to make and go to all appointments, and call your doctor if you are having problems. It's also a good idea to know your test results and keep a list of the medicines you take. How can you care for yourself at home? · Be safe with medicines. Read and follow all instructions on the label. ¨ If the doctor gave you a prescription medicine for pain, take it as prescribed. ¨ If you are not taking a prescription pain medicine, ask your doctor if you can take an over-the-counter medicine. · Rest and protect your knee. Take a break from any activity that may cause pain. · Put ice or a cold pack on your knee for 10 to 20 minutes at a time. Put a thin cloth between the ice and your skin. · Prop up a sore knee on a pillow when you ice it or anytime you sit or lie down for the next 3 days. Try to keep it above the level of your heart. This will help reduce swelling. · If your knee is not swollen, you can put moist heat, a heating pad, or a warm cloth on your knee. · If your doctor recommends an elastic bandage, sleeve, or other type of support for your knee, wear it as directed.   · Follow your doctor's instructions about how much weight you can put on your leg. Use a cane, crutches, or a walker as instructed. · Follow your doctor's instructions about activity during your healing process. If you can do mild exercise, slowly increase your activity. · Reach and stay at a healthy weight. Extra weight can strain the joints, especially the knees and hips, and make the pain worse. Losing even a few pounds may help. When should you call for help? Call 911 anytime you think you may need emergency care. For example, call if:  ? · You have symptoms of a blood clot in your lung (called a pulmonary embolism). These may include:  ¨ Sudden chest pain. ¨ Trouble breathing. ¨ Coughing up blood. ?Call your doctor now or seek immediate medical care if:  ? · You have severe or increasing pain. ? · Your leg or foot turns cold or changes color. ? · You cannot stand or put weight on your knee. ? · Your knee looks twisted or bent out of shape. ? · You cannot move your knee. ? · You have signs of infection, such as:  ¨ Increased pain, swelling, warmth, or redness. ¨ Red streaks leading from the knee. ¨ Pus draining from a place on your knee. ¨ A fever. ? · You have signs of a blood clot in your leg (called a deep vein thrombosis), such as:  ¨ Pain in your calf, back of the knee, thigh, or groin. ¨ Redness and swelling in your leg or groin. ? Watch closely for changes in your health, and be sure to contact your doctor if:  ? · You have tingling, weakness, or numbness in your knee. ? · You have any new symptoms, such as swelling. ? · You have bruises from a knee injury that last longer than 2 weeks. ? · You do not get better as expected. Where can you learn more? Go to http://mac-rufus.info/. Enter K195 in the search box to learn more about \"Knee Pain or Injury: Care Instructions. \"  Current as of: March 20, 2017  Content Version: 11.4  © 5906-9464 [employer]  Care instructions adapted under license by [employer] (which disclaims liability or warranty for this information). If you have questions about a medical condition or this instruction, always ask your healthcare professional. [address] any warranty or liability for your use of this information. Scrapes (Abrasions): Care Instructions  Your Care Instructions  Scrapes (abrasions) are wounds where your skin has been rubbed or torn off. Most scrapes do not go deep into the skin, but some may remove several layers of skin. Scrapes usually don't bleed much, but they may ooze pinkish fluid. Scrapes on the head or face may appear worse than they are. They may bleed a lot because of the good blood supply to this area. Most scrapes heal well and may not need a bandage. They usually heal within 3 to 7 days. A large, deep scrape may take 1 to 2 weeks or longer to heal. A scab may form on some scrapes. Follow-up care is a key part of your treatment and safety. Be sure to make and go to all appointments, and call your doctor if you are having problems. It's also a good idea to know your test results and keep a list of the medicines you take. How can you care for yourself at home? · If your doctor told you how to care for your wound, follow your doctor's instructions. If you did not get instructions, follow this general advice:  ¨ Wash the scrape with clean water 2 times a day. Don't use hydrogen peroxide or alcohol, which can slow healing. ¨ You may cover the scrape with a thin layer of petroleum jelly, such as Vaseline, and a nonstick bandage. ¨ Apply more petroleum jelly and replace the bandage as needed. · Prop up the injured area on a pillow anytime you sit or lie down during the next 3 days. Try to keep it above the level of your heart. This will help reduce swelling. · Be safe with medicines. Take pain medicines exactly as directed. ¨ If the doctor gave you a prescription medicine for pain, take it as prescribed.   ¨ If you are not taking a prescription pain medicine, ask your doctor if you can take an over-the-counter medicine. When should you call for help? Call your doctor now or seek immediate medical care if:  ? · You have signs of infection, such as:  ¨ Increased pain, swelling, warmth, or redness around the scrape. ¨ Red streaks leading from the scrape. ¨ Pus draining from the scrape. ¨ A fever. ? · The scrape starts to bleed, and blood soaks through the bandage. Oozing small amounts of blood is normal.   ? Watch closely for changes in your health, and be sure to contact your doctor if the scrape is not getting better each day. Where can you learn more? Go to http://mac-rufus.info/. Enter A374 in the search box to learn more about \"Scrapes (Abrasions): Care Instructions. \"  Current as of: March 20, 2017  Content Version: 11.4  © 7789-8767 [employer]. Care instructions adapted under license by [employer] (which disclaims liability or warranty for this information). If you have questions about a medical condition or this instruction, always ask your healthcare professional. [place] [address] any warranty or liability for your use of this information.

## 2018-05-29 NOTE — ED PROVIDER NOTES
EMERGENCY DEPARTMENT HISTORY AND PHYSICAL EXAM      Date: 5/29/2018  Patient Name: Víctor Carson    History of Presenting Illness     Chief Complaint   Patient presents with    Knee Pain     Via w/c w/ father w/ c/o R knee pain after falling on pool steps Sunday morning. Pt denies hitting head/LOC. History Provided By: Patient    HPI: Víctor Carson, 53 y.o. female with PMHx significant for diabetes, presents ambulatory to ED [facility] ED with cc of progressively worsening, severe, medial right knee pain s/p an injury that occurred 3 days ago. She states that her knee pain is exacerbated with ambulation, and she also c/o associated scattered abrasions to her right leg. She states that she was ambulating up a flight of slippery stairs at the pool when she slipped, twisted her right leg, and fell directly onto her right knee. She denies sustaining a head injury or LOC. She reports taking Tylenol with no relief in pain. She states that her tetanus was updated in 2009. She reports a history of a right ankle reconstruction, but she denies prior right knee injuries or having local Orthopedic Surgery follow up. She specifically denies headaches, shoulder pain, back pain, neck pain, or calf pain. Social history significant for: + Tobacco (1.5 PPD), - EtOH, - Illicit Drug Use    There are no other complaints, changes, or physical findings at this time. PCP: Meño Navarro III, DO    Current Outpatient Prescriptions   Medication Sig Dispense Refill    HYDROcodone-acetaminophen (NORCO)  mg tablet Take 1 Tab by mouth.  fluticasone (FLONASE) 50 mcg/actuation nasal spray 2 Sprays by Both Nostrils route daily. 1 Bottle 1    acetaminophen (TYLENOL) 325 mg tablet Take 2 Tabs by mouth every six (6) hours as needed for Fever. 30 Tab 0    insulin aspart U-100 (NOVOLOG FLEXPEN U-100 INSULIN) 100 unit/mL inpn 1:15 for carbs and 1:30 > 150 for correction during the day and > 180 at bedtime.   Max 50 units per day--replaces humalog 15 mL 11    insulin glargine (LANTUS SOLOSTAR U-100 INSULIN) 100 unit/mL (3 mL) inpn 32 Units by SubCUTAneous route daily. Dose change 4/24/18--updated med list--did not send prescription to the pharmacy 15 mL 1    albuterol (PROVENTIL HFA, VENTOLIN HFA, PROAIR HFA) 90 mcg/actuation inhaler Take 1 Puff by inhalation every four (4) hours as needed for Wheezing. 1 Inhaler 1    glucagon (GLUCAGON EMERGENCY KIT, HUMAN,) 1 mg injection Use as directed 2 Kit 11    atorvastatin (LIPITOR) 40 mg tablet Take 1 Tab by mouth daily. 30 Tab 11    ALPRAZolam (XANAX) 0.5 mg tablet Take 1 Tab by mouth three (3) times daily as needed for Anxiety. Max Daily Amount: 1.5 mg. 40 Tab 0    lidocaine (LIDODERM) 5 % APPLY ONE PATCH DAILY. REMOVE AND DISCARD PATCH WITHIN 12 HOURS OR AS DIRECTED BY MD      tiZANidine (ZANAFLEX) 4 mg tablet TAKE ONE TABLET BY MOUTH EVERY 6 HOURS AS NEEDED FOR MUSCLE SPASM      ARIPiprazole (ABILIFY) 10 mg tablet Take 10 mg by mouth.  nicotine (NICODERM CQ) 21 mg/24 hr 1 Patch by TransDERmal route every twenty-four (24) hours for 30 days. 30 Patch 0    esomeprazole (NEXIUM) 40 mg capsule Take  by mouth daily.  gabapentin (NEURONTIN) 300 mg capsule Take 2 Caps by mouth three (3) times daily.  Insulin Needles, Disposable, (BD INSULIN PEN NEEDLE UF MINI) 31 gauge x 3/16\" ndle Use as directed up to 4 times daily 100 Pen Needle 11    carvedilol (COREG) 25 mg tablet Take 0.5 Tabs by mouth two (2) times daily (with meals).  furosemide (LASIX) 40 mg tablet 2 tablets twice daily--Dose change 2/19/18--updated med list--did not send prescription to the pharmacy      potassium chloride (K-DUR, KLOR-CON) 20 mEq tablet Take 1 tab bid      metOLazone (ZAROXOLYN) 2.5 mg tablet Take 1 Tab by mouth as needed.  magnesium oxide (MAG-OX) 400 mg tablet Take 1 Tab by mouth daily.       ONE TOUCH DELICA 33 gauge misc Test 8 times daily--DX E10.65 250 Lancet 11    venlafaxine-SR (EFFEXOR XR) 150 mg capsule Take 150 mg by mouth two (2) times a day.  insulin lispro (HUMALOG KWIKPEN) 100 unit/mL kwikpen 1:15 for carbs and 1:30 > 150 for correction during the day and > 180 at bedtime. Max 50 units per day 1 Package        Past History     Past Medical History:  Past Medical History:   Diagnosis Date    Achilles tendon rupture     along with torn right patellar/femoral ligament    Arthritis     rt. foot    Chronic pain     abdominal pain    Diabetes (HCC)     IDDM on insulin pump and sensor    DM type 1 (diabetes mellitus, type 1) (HCC)     age 25    Endometriosis     s/p ex-lap 4x    Gastric ulcer     GERD (gastroesophageal reflux disease)     Herpes     Other and unspecified hyperlipidemia     Panic attacks     Psychiatric disorder     anxiety, panic attacks    PTSD (post-traumatic stress disorder)     Unspecified essential hypertension        Past Surgical History:  Past Surgical History:   Procedure Laterality Date    HX GYN      2 d&c's    HX GYN      laparoscopies x3    HX ORTHOPAEDIC  2002    achiles tendon repair,talus repair    HX ORTHOPAEDIC      injections x2 to right shoulder       Family History:  Family History   Problem Relation Age of Onset    Diabetes Mother      diet controlled    Diabetes Father      R foot amupation    Liver Disease Father     Heart Disease Paternal Uncle      MI in his 46s    Stroke Neg Hx        Social History:  Social History   Substance Use Topics    Smoking status: Current Every Day Smoker     Packs/day: 1.50     Years: 17.00     Types: Cigarettes     Last attempt to quit: 11/28/2017    Smokeless tobacco: Never Used      Comment: Trying to quit    Alcohol use No      Comment: a beer every few months       Allergies: Allergies   Allergen Reactions    Zocor [Simvastatin] Myalgia    Lisinopril Cough       Review of Systems   Review of Systems   Constitutional: Negative. HENT: Negative. Eyes: Negative.     Respiratory: Negative. Cardiovascular: Negative. Gastrointestinal: Negative. Genitourinary: Negative. Musculoskeletal: Positive for arthralgias (Right knee). Negative for back pain, myalgias and neck pain. Denies shoulder pain   Skin: Positive for wound (Scattered abrasions). Neurological: Negative. Negative for headaches. All other systems reviewed and are negative. Physical Exam   Physical Exam   Constitutional: She is oriented to person, place, and time. She appears well-developed and well-nourished. No distress. 52 yo  female   HENT:   Head: Normocephalic and atraumatic. Eyes: Conjunctivae are normal. Right eye exhibits no discharge. Left eye exhibits no discharge. Neck: Normal range of motion. Neck supple. Cardiovascular: Normal rate, regular rhythm, normal heart sounds and intact distal pulses. No murmur heard. Pulmonary/Chest: Effort normal and breath sounds normal. No respiratory distress. She has no wheezes. Musculoskeletal:   R KNEE: + effusion. Diffuse TTP without point tenderness. No ecchymosis or deformity. Distal NV intact. Cap refill < 2 sec. Pt unable to ambulate. Lymphadenopathy:     She has no cervical adenopathy. Neurological: She is alert and oriented to person, place, and time. Skin: Skin is warm and dry. She is not diaphoretic. Psychiatric: She has a normal mood and affect. Her behavior is normal.   Nursing note and vitals reviewed.       Diagnostic Study Results     Labs -     Recent Results (from the past 12 hour(s))   POC CHEM8    Collection Time: 05/29/18 10:31 AM   Result Value Ref Range    Calcium, ionized (POC) 1.09 (L) 1.12 - 1.32 mmol/L    Sodium (POC) 132 (L) 136 - 145 mmol/L    Potassium (POC) 4.1 3.5 - 5.1 mmol/L    Chloride (POC) 90 (L) 98 - 107 mmol/L    CO2 (POC) 33 (H) 21 - 32 mmol/L    Anion gap (POC) 14 10 - 20 mmol/L    Glucose (POC) 195 (H) 65 - 100 mg/dL    BUN (POC) 24 (H) 9 - 20 mg/dL    Creatinine (POC) 1.0 0.6 - 1.3 mg/dL GFRAA, POC >60 >60 ml/min/1.73m2    GFRNA, POC 59 (L) >60 ml/min/1.73m2    Hematocrit (POC) 41 35.0 - 47.0 %    Comment Comment Not Indicated. Radiologic Studies -    EXAM:  XR KNEE RT 3 V     INDICATION: Right knee pain after twisting injury on Sunday.     COMPARISON: None.     FINDINGS: Three views of the right knee demonstrate no fracture or other acute  osseous or articular abnormality. There is no effusion.     IMPRESSION  IMPRESSION:  No acute abnormality.                    Medical Decision Making   I am the first provider for this patient. I reviewed the vital signs, available nursing notes, past medical history, past surgical history, family history and social history. Vital Signs-Reviewed the patient's vital signs. Patient Vitals for the past 12 hrs:   Temp Pulse Resp BP SpO2   05/29/18 0947 97.4 °F (36.3 °C) 78 15 156/83 100 %       Records Reviewed: Nursing Notes and Old Medical Records    Provider Notes (Medical Decision Making):   DDx: Sprain, strain, fracture, contusion, meniscal injury, tetanus update. ED Course:   Initial assessment performed. The patients presenting problems have been discussed, and they are in agreement with the care plan formulated and outlined with them. I have encouraged them to ask questions as they arise throughout their visit. Progress Note:  10:10 AM  The patient states that one of her medications was recently increased, and she is requesting standard blood work at this time. She states that her Nephrologist wanted blood work ordered. Will order POC blood work. Written by [facility], ED Scribe, as dictated by Mary Li.    Progress Note:  11:22 AM  The patient's  was reviewed. In the past 30 days she has filled:  Norco 10 mg #120 on 5/1; she consistently fills this prescription monthly. Xanax 0.5 mg quantity 150 on 5/23.   Written by [facility], ED Scribe, as dictated by Mary Li.    Progress Note:  11:23 AM  The patient was updated on her negative xray results and blood work. She conveys her understanding. She was recommended to follow up with her PCP for pain control prescription and she was recommended to follow up with her Nephrologist for the accurate blood work that her Nephrologist wanted drawn. Will not be prescribing the patient a controlled substance today. Will order ACE wrap and knee immobilizer. Written by SAUL Clements, as dictated by Kushal Edmond.    Tobacco Counseling  Discussed the risks of smoking and the benefits of smoking cessation as well as the long term sequelae of smoking with the patient. The patient verbalized their understanding. Critical Care Time: 0 minutes    Discharge Note:  11:25 AM  The pt is ready for discharge. The pt's signs, symptoms, diagnosis, and discharge instructions have been discussed and pt has conveyed their understanding. The pt is to follow up as recommended or return to ER should their symptoms worsen. Plan has been discussed and pt is in agreement. PLAN:  1. Current Discharge Medication List      CONTINUE these medications which have NOT CHANGED    Details   HYDROcodone-acetaminophen (NORCO)  mg tablet Take 1 Tab by mouth. fluticasone (FLONASE) 50 mcg/actuation nasal spray 2 Sprays by Both Nostrils route daily. Qty: 1 Bottle, Refills: 1      acetaminophen (TYLENOL) 325 mg tablet Take 2 Tabs by mouth every six (6) hours as needed for Fever. Qty: 30 Tab, Refills: 0      insulin aspart U-100 (NOVOLOG FLEXPEN U-100 INSULIN) 100 unit/mL inpn 1:15 for carbs and 1:30 > 150 for correction during the day and > 180 at bedtime. Max 50 units per day--replaces humalog  Qty: 15 mL, Refills: 11      insulin glargine (LANTUS SOLOSTAR U-100 INSULIN) 100 unit/mL (3 mL) inpn 32 Units by SubCUTAneous route daily.  Dose change 4/24/18--updated med list--did not send prescription to the pharmacy  Qty: 15 mL, Refills: 1      albuterol (PROVENTIL HFA, VENTOLIN HFA, PROAIR HFA) 90 mcg/actuation inhaler Take 1 Puff by inhalation every four (4) hours as needed for Wheezing. Qty: 1 Inhaler, Refills: 1      glucagon (GLUCAGON EMERGENCY KIT, HUMAN,) 1 mg injection Use as directed  Qty: 2 Kit, Refills: 11      atorvastatin (LIPITOR) 40 mg tablet Take 1 Tab by mouth daily. Qty: 30 Tab, Refills: 11      ALPRAZolam (XANAX) 0.5 mg tablet Take 1 Tab by mouth three (3) times daily as needed for Anxiety. Max Daily Amount: 1.5 mg.  Qty: 40 Tab, Refills: 0      lidocaine (LIDODERM) 5 % APPLY ONE PATCH DAILY. REMOVE AND DISCARD PATCH WITHIN 12 HOURS OR AS DIRECTED BY MD      tiZANidine (ZANAFLEX) 4 mg tablet TAKE ONE TABLET BY MOUTH EVERY 6 HOURS AS NEEDED FOR MUSCLE SPASM      ARIPiprazole (ABILIFY) 10 mg tablet Take 10 mg by mouth. nicotine (NICODERM CQ) 21 mg/24 hr 1 Patch by TransDERmal route every twenty-four (24) hours for 30 days. Qty: 30 Patch, Refills: 0      esomeprazole (NEXIUM) 40 mg capsule Take  by mouth daily. gabapentin (NEURONTIN) 300 mg capsule Take 2 Caps by mouth three (3) times daily. Insulin Needles, Disposable, (BD INSULIN PEN NEEDLE UF MINI) 31 gauge x 3/16\" ndle Use as directed up to 4 times daily  Qty: 100 Pen Needle, Refills: 11      carvedilol (COREG) 25 mg tablet Take 0.5 Tabs by mouth two (2) times daily (with meals). furosemide (LASIX) 40 mg tablet 2 tablets twice daily--Dose change 2/19/18--updated med list--did not send prescription to the pharmacy      potassium chloride (K-DUR, KLOR-CON) 20 mEq tablet Take 1 tab bid      metOLazone (ZAROXOLYN) 2.5 mg tablet Take 1 Tab by mouth as needed. magnesium oxide (MAG-OX) 400 mg tablet Take 1 Tab by mouth daily. ONE TOUCH DELICA 33 gauge misc Test 8 times daily--DX E10.65  Qty: 250 Lancet, Refills: 11      venlafaxine-SR (EFFEXOR XR) 150 mg capsule Take 150 mg by mouth two (2) times a day.       insulin lispro (HUMALOG KWIKPEN) 100 unit/mL kwikpen 1:15 for carbs and 1:30 > 150 for correction during the day and > 180 at bedtime. Max 50 units per day  Qty: 1 Package           2. Follow-up Information     Follow up With Details Comments Contact Info    Margi Paredes MD In 2 days  [address]  [address] [facility],[address]  [facility].  [phone]          3. Rest, ice, compress, and elevate  4. Tylenol/Motrin PRN  5. Use crutches and knee immobilizer as discussed      Return to ED if worse     Diagnosis     Clinical Impression:   1. Injury of right knee, initial encounter    2. Abrasion of right ankle without infection, initial encounter    3. Need for tetanus booster    4. Fall, initial encounter    5. Narcotic dependence ([address]    6. Benzodiazepine dependence ([address]        Attestations: This note is prepared by Rosalba Gonzalez, acting as Scribe for [employer] .    The scribe's documentation has been prepared under my direction and personally reviewed by me in its entirety. I confirm that the note above accurately reflects all work, treatment, procedures, and medical decision making performed by me.

## 2018-06-06 NOTE — TELEPHONE ENCOUNTER
RX faxed to [facility]. Confirmation received. Called patient, but did not leave message, unsure of number.

## 2018-06-06 NOTE — TELEPHONE ENCOUNTER
Ptmm requested a call regarding a mouthwash that was prescribed to her. Pt stated she went to  prescription, and it is not available. Pt uses the [facility] on file. Pt best contact [phone].        Message received & copied from [facility]

## 2018-06-06 NOTE — TELEPHONE ENCOUNTER
The patient is requesting a call back in regards to thrush in her mouth.  (W)[phone]       Message received & copied from [facility]

## 2018-06-07 NOTE — TELEPHONE ENCOUNTER
Prudence[facility]. States she needs refill done to them for patient's prescription on \"Magic Mouthwash\" as Vida Lund can not fill the prescription due to be ing a compound prescription. Please call if any questions.  Thank you

## 2018-06-08 RX ORDER — NYSTATIN 100000 [USP'U]/ML
1 SUSPENSION ORAL 4 TIMES DAILY
Qty: 100 ML | Refills: 1 | Status: SHIPPED | OUTPATIENT
Start: 2018-06-08 | End: 2018-07-24

## 2018-07-02 ENCOUNTER — TELEPHONE (OUTPATIENT)
Dept: INTERNAL MEDICINE CLINIC | Age: 48
End: 2018-07-02

## 2018-07-02 NOTE — TELEPHONE ENCOUNTER
Patient has bad cough and congestion.  Has appt Friday, refused the [address] [facility]. Sil Seymour wants sooner if you can do it.  I told her to call and check for cancellations. Rigoberto Bosch wanted you to know what's going on with her. Shauna Levar number is [phone]       Message received & copied from [address] [place] [address]

## 2018-07-03 ENCOUNTER — TELEPHONE (OUTPATIENT)
Dept: INTERNAL MEDICINE CLINIC | Age: 48
End: 2018-07-03

## 2018-07-03 NOTE — TELEPHONE ENCOUNTER
Patient with history of Diabetes states she needs a call back to get an appt sooner than Friday for Bad cough & chest congestion or to see if something can be called in for cough & possibly antibiotic until her Acute appt on Friday, 7/6/18. Please call to advise.  Thank you

## 2018-07-06 ENCOUNTER — OFFICE VISIT (OUTPATIENT)
Dept: INTERNAL MEDICINE CLINIC | Age: 48
End: 2018-07-06

## 2018-07-06 VITALS
DIASTOLIC BLOOD PRESSURE: 67 MMHG | HEIGHT: 65 IN | BODY MASS INDEX: 33.36 KG/M2 | WEIGHT: 200.2 LBS | TEMPERATURE: 99.2 F | RESPIRATION RATE: 16 BRPM | SYSTOLIC BLOOD PRESSURE: 105 MMHG | HEART RATE: 88 BPM | OXYGEN SATURATION: 96 %

## 2018-07-06 DIAGNOSIS — J06.9 ACUTE URI: Primary | ICD-10-CM

## 2018-07-06 DIAGNOSIS — R06.2 WHEEZING: ICD-10-CM

## 2018-07-06 RX ORDER — CODEINE PHOSPHATE AND GUAIFENESIN 10; 100 MG/5ML; MG/5ML
5 SOLUTION ORAL
Qty: 120 ML | Refills: 0 | Status: SHIPPED | OUTPATIENT
Start: 2018-07-06 | End: 2018-07-24

## 2018-07-06 RX ORDER — AMOXICILLIN AND CLAVULANATE POTASSIUM 875; 125 MG/1; MG/1
1 TABLET, FILM COATED ORAL EVERY 12 HOURS
Qty: 14 TAB | Refills: 0 | Status: SHIPPED | OUTPATIENT
Start: 2018-07-06 | End: 2018-07-13

## 2018-07-06 RX ORDER — ALBUTEROL SULFATE 90 UG/1
1 AEROSOL, METERED RESPIRATORY (INHALATION)
Qty: 1 INHALER | Refills: 1 | Status: SHIPPED | OUTPATIENT
Start: 2018-07-06 | End: 2018-10-30

## 2018-07-06 NOTE — MR AVS SNAPSHOT
[address] [facility] 
[phone] Patient: David Robledo MRN: [id number] CNE:[birth year] Visit Information Date & Time Provider Department Dept. Phone Encounter #  
 7/6/2018 10:30 AM Jalyn Martínez, [address] [place] [address],[address] [phone] [id number] Follow-up Instructions Return if symptoms worsen or fail to improve. Follow-up and Disposition History Your Appointments 7/24/2018  9:30 AM  
Follow Up with MD Tomasz [facility] [address] [facility]) [address] [phone] [address] 7/31/2018  9:30 AM  
PHYSICAL PRE OP with Shi Ca III, DO [facility] [address]) Appt Note: my chart physical Sc 07/02/18  
 [facility] [address] [phone]  
[address] [place] [address] [address] [facility] Upcoming Health Maintenance Date Due  
 MEDICARE YEARLY EXAM 4/12/2018 Influenza Age 5 to Adult 8/1/2018 FOOT EXAM Q1 9/28/2018 HEMOGLOBIN A1C Q6M 10/24/2018 Pneumococcal 19-64 Highest Risk (3 of 3 - PCV13) 11/14/2018 EYE EXAM RETINAL OR DILATED Q1 3/27/2019 MICROALBUMIN Q1 4/24/2019 LIPID PANEL Q1 4/24/2019 PAP AKA CERVICAL CYTOLOGY 10/9/2020 DTaP/Tdap/Td series (2 - Td) 5/29/2028 Allergies as of 7/6/2018  Review Complete On: 7/6/2018 By: Jalyn Martínez MD  
  
 Severity Noted Reaction Type Reactions Zocor [Simvastatin] Medium 11/20/2009   Intolerance Myalgia Lisinopril  05/26/2011    Cough Current Immunizations  Never Reviewed Name Date Influenza Vaccine (Quad) PF 11/14/2017 Influenza Vaccine Split 10/1/2012 Pneumococcal Polysaccharide (PPSV-23) 11/14/2017 Tdap 5/29/2018 10:19 AM, 12/15/2017 Not reviewed this visit You Were Diagnosed With   
  
 Codes Comments Acute URI    -  Primary ICD-10-CM: J06.9 ICD-9-CM: 465.9 Wheezing     ICD-10-CM: R06.2 ICD-9-CM: 786.07 Vitals BP Pulse Temp Resp Height(growth percentile) Weight(growth percentile) 105/67 (BP 1 Location: Left arm, BP Patient Position: Sitting) 88 99.2 °F (37.3 °C) (Oral) 16 5' 5\" (1.651 m) 200 lb 3.2 oz (90.8 kg) SpO2 BMI OB Status Smoking Status 96% 33.32 kg/m2 Implant Current Every Day Smoker Vitals History BMI and BSA Data Body Mass Index Body Surface Area  
 33.32 kg/m 2 2.04 m 2 Preferred Pharmacy Pharmacy Name Phone Jenni Cam[facility] [address] , [address] [place] [address] [phone] Your Updated Medication List  
  
   
This list is accurate as of 7/6/18 10:51 AM.  Always use your most recent med list.  
  
  
  
  
 acetaminophen 325 mg tablet Commonly known as:  TYLENOL Take 2 Tabs by mouth every six (6) hours as needed for Fever. albuterol 90 mcg/actuation inhaler Commonly known as:  PROVENTIL HFA, VENTOLIN HFA, PROAIR HFA Take 1 Puff by inhalation every four (4) hours as needed for Wheezing. ALPRAZolam 0.5 mg tablet Commonly known as:  Willaim Selvin Take 1 Tab by mouth three (3) times daily as needed for Anxiety. Max Daily Amount: 1.5 mg.  
  
 amoxicillin-clavulanate 875-125 mg per tablet Commonly known as:  AUGMENTIN Take 1 Tab by mouth every twelve (12) hours for 7 days. ARIPiprazole 10 mg tablet Commonly known as:  ABILIFY Take 10 mg by mouth. atorvastatin 40 mg tablet Commonly known as:  LIPITOR Take 1 Tab by mouth daily. carvedilol 25 mg tablet Commonly known as:  Jeffery Gut Take 0.5 Tabs by mouth two (2) times daily (with meals). CHANTIX CONTINUING MONTH BOX 1 mg tablet Generic drug:  varenicline TAKE ONE TABLET BY MOUTH TWICE A DAY  
  
 EFFEXOR  mg capsule Generic drug:  venlafaxine-SR Take 150 mg by mouth two (2) times a day. fluticasone 50 mcg/actuation nasal spray Commonly known as:  Filbert Chard 2 Sprays by Both Nostrils route daily. furosemide 40 mg tablet Commonly known as:  LASIX  
2 tablets twice daily--Dose change 2/19/18--updated med list--did not send prescription to the pharmacy  
  
 gabapentin 300 mg capsule Commonly known as:  NEURONTIN Take 2 Caps by mouth three (3) times daily. glucagon 1 mg injection Commonly known as:  GLUCAGON EMERGENCY KIT (HUMAN) Use as directed  
  
 guaiFENesin-codeine 100-10 mg/5 mL solution Commonly known as:  ROBAFEN AC Take 5 mL by mouth three (3) times daily as needed for Cough. Max Daily Amount: 15 mL. HumaLOG KwikPen Insulin 100 unit/mL kwikpen Generic drug:  insulin lispro 1:15 for carbs and 1:30 > 150 for correction during the day and > 180 at bedtime. Max 50 units per day  
  
 insulin aspart U-100 100 unit/mL Inpn Commonly known as:  NovoLOG Flexpen U-100 Insulin 1:15 for carbs and 1:30 > 150 for correction during the day and > 180 at bedtime. Max 50 units per day--replaces humalog  
  
 insulin glargine 100 unit/mL (3 mL) Inpn Commonly known as:  LANTUS SOLOSTAR U-100 INSULIN  
32 Units by SubCUTAneous route daily. Dose change 4/24/18--updated med list--did not send prescription to the pharmacy Insulin Needles (Disposable) 31 gauge x 3/16\" Ndle Commonly known as:  BD ULTRA-FINE MINI PEN NEEDLE Use as directed up to 4 times daily LIDODERM 5 % Generic drug:  lidocaine APPLY ONE PATCH DAILY. REMOVE AND DISCARD PATCH WITHIN 12 HOURS OR AS DIRECTED BY MD  
  
 magic mouthwash solution Magic mouth wash  Maalox Lidocaine 2% viscous  Diphenhydramine oral solution   Pharmacy to mix equal portions of ingredients to a total volume as indicated in the dispense amount. 5 ml every 6 hours. magnesium oxide 400 mg tablet Commonly known as:  MAG-OX Take 1 Tab by mouth daily. metOLazone 2.5 mg tablet Commonly known as:  Finas Dirk Take 1 Tab by mouth as needed. NexIUM 40 mg capsule Generic drug:  esomeprazole Take  by mouth daily. NORCO  mg tablet Generic drug:  HYDROcodone-acetaminophen Take 1 Tab by mouth. nystatin 100,000 unit/mL suspension Commonly known as:  MYCOSTATIN Take 5 mL by mouth four (4) times daily. swish and spit One Touch Delica 33 gauge Misc Generic drug:  lancets Test 8 times daily--DX E10.65  
  
 potassium chloride 20 mEq tablet Commonly known as:  K-DUR, KLOR-CON Take 1 tab bid  
  
 tiZANidine 4 mg tablet Commonly known as:  Marekgerda José TAKE ONE TABLET BY MOUTH EVERY 6 HOURS AS NEEDED FOR MUSCLE SPASM Prescriptions Printed Refills  
 amoxicillin-clavulanate (AUGMENTIN) 875-125 mg per tablet 0 Sig: Take 1 Tab by mouth every twelve (12) hours for 7 days. Class: Print Route: Oral  
 guaiFENesin-codeine (ROBAFEN AC) 100-10 mg/5 mL solution 0 Sig: Take 5 mL by mouth three (3) times daily as needed for Cough. Max Daily Amount: 15 mL. Class: Print Route: Oral  
  
Prescriptions Sent to Pharmacy Refills  
 albuterol (PROVENTIL HFA, VENTOLIN HFA, PROAIR HFA) 90 mcg/actuation inhaler 1 Sig: Take 1 Puff by inhalation every four (4) hours as needed for Wheezing. Class: Normal  
 Pharmacy: Lang Carmona [address] [place] [address] #: [phone] Route: Inhalation Follow-up Instructions Return if symptoms worsen or fail to improve. Introducing [facility] & HEALTH SERVICES! Dear [facility] MEDICINE: 
Thank you for requesting a [employer] account. Our records indicate that you already have an active [employer] account. You can access your account anytime at [email or web address] Did you know that you can access your hospital and ER discharge instructions at any time in [email or web address]? You can also review all of your test results from your hospital stay or ER visit. Additional Information If you have questions, please visit the Frequently Asked Questions section of the [email or web address] website at [email or web address] Remember, [email or web address] is NOT to be used for urgent needs. For medical emergencies, dial 911. Now available from your iPhone and Android! Please provide this summary of care documentation to your next provider. Your primary care clinician is listed as Ronald Damon If you have any questions after today's visit, please call [phone].

## 2018-07-06 NOTE — PROGRESS NOTES
SUBJECTIVE  Ms. Edson Olsen presents today acutely for     Chief Complaint   Patient presents with    URI     pt here today c.o coughing up green mucous, congsestion x 1 week       Not taking any OTC cough medicine. She is a smoker, trying to quit. OBJECTIVE  Visit Vitals    /67 (BP 1 Location: Left arm, BP Patient Position: Sitting)    Pulse 88    Temp 99.2 °F (37.3 °C) (Oral)    Resp 16    Ht 5' 5\" (1.651 m)    Wt 200 lb 3.2 oz (90.8 kg)    SpO2 96%    BMI 33.32 kg/m2     Gen: Pleasant 53 y.o.  female in NAD.   HEENT: PERRLA. EOMI. OP moist and pink. +Hoarseness  Neck: Supple.  No LAD.  HEART: RRR, No M/G/R.   LUNGS: CTAB No W/R.   ABDOMEN: S, NT, ND, BS+.   EXTREMITIES: Warm. No C/C/E. SKIN: Warm. Dry. No rashes or other lesions noted. ASSESSMENT / PLAN    ICD-10-CM ICD-9-CM    1. Acute URI J06.9 465.9 amoxicillin-clavulanate (AUGMENTIN) 875-125 mg per tablet      guaiFENesin-codeine (ROBAFEN AC) 100-10 mg/5 mL solution   2. Wheezing R06.2 786.07 albuterol (PROVENTIL HFA, VENTOLIN HFA, PROAIR HFA) 90 mcg/actuation inhaler       I have reviewed with the patient details of the assessment and plan and all questions were answered. Relevant patient education was performed. Follow-up Disposition:  Return if symptoms worsen or fail to improve.

## 2018-07-06 NOTE — PROGRESS NOTES
1. Have you been to the ER, urgent care clinic since your last visit? Hospitalized since your last visit?no    2. Have you seen or consulted any other health care providers outside of the [address] [place] [address] since your last visit? Include any pap smears or colon screening.  no

## 2018-07-24 ENCOUNTER — TELEPHONE (OUTPATIENT)
Dept: ENDOCRINOLOGY | Age: 48
End: 2018-07-24

## 2018-07-24 ENCOUNTER — OFFICE VISIT (OUTPATIENT)
Dept: ENDOCRINOLOGY | Age: 48
End: 2018-07-24

## 2018-07-24 VITALS
WEIGHT: 199.8 LBS | DIASTOLIC BLOOD PRESSURE: 68 MMHG | HEART RATE: 99 BPM | HEIGHT: 65 IN | SYSTOLIC BLOOD PRESSURE: 126 MMHG | BODY MASS INDEX: 33.29 KG/M2

## 2018-07-24 DIAGNOSIS — E78.5 HYPERLIPIDEMIA LDL GOAL <100: ICD-10-CM

## 2018-07-24 DIAGNOSIS — R79.89 ELEVATED LFTS: ICD-10-CM

## 2018-07-24 DIAGNOSIS — I10 ESSENTIAL HYPERTENSION: Chronic | ICD-10-CM

## 2018-07-24 DIAGNOSIS — E10.21 DIABETIC NEPHROPATHY ASSOCIATED WITH TYPE 1 DIABETES MELLITUS (HCC): Primary | Chronic | ICD-10-CM

## 2018-07-24 LAB — HBA1C MFR BLD HPLC: 9 %

## 2018-07-24 RX ORDER — INSULIN GLARGINE 100 [IU]/ML
34 INJECTION, SOLUTION SUBCUTANEOUS DAILY
Qty: 15 ML | Refills: 1
Start: 2018-07-24 | End: 2018-08-16 | Stop reason: SDUPTHER

## 2018-07-24 RX ORDER — CARVEDILOL 12.5 MG/1
12.5 TABLET ORAL DAILY
COMMUNITY
Start: 2018-06-26 | End: 2018-12-17 | Stop reason: SDUPTHER

## 2018-07-24 RX ORDER — FLASH GLUCOSE SENSOR
KIT MISCELLANEOUS
Qty: 3 EACH | Refills: 11 | Status: SHIPPED | OUTPATIENT
Start: 2018-07-24 | End: 2020-03-26

## 2018-07-24 RX ORDER — LORAZEPAM 1 MG/1
1 TABLET ORAL 3 TIMES DAILY
COMMUNITY
Start: 2018-07-18 | End: 2019-06-20

## 2018-07-24 RX ORDER — FENTANYL 100 UG/H
1 PATCH TRANSDERMAL
COMMUNITY
Start: 2018-07-23 | End: 2022-03-10

## 2018-07-24 RX ORDER — ARIPIPRAZOLE 30 MG/1
30 TABLET ORAL DAILY
COMMUNITY
Start: 2018-07-18 | End: 2020-03-26

## 2018-07-24 RX ORDER — INSULIN GLARGINE 100 [IU]/ML
36 INJECTION, SOLUTION SUBCUTANEOUS DAILY
Qty: 15 ML | Refills: 1
Start: 2018-07-24 | End: 2018-07-24 | Stop reason: SDUPTHER

## 2018-07-24 NOTE — MR AVS SNAPSHOT
[address] [facility] [address] [address] [phone] Patient: Arianna Marrero MRN: [id number] HIM:[birth year] Visit Information Date & Time Provider Department Dept. Phone Encounter #  
 7/24/2018  9:30 AM María Han, [address] [facility] [phone] [id number] Follow-up Instructions Return for 10/30/18 at 9:30am.  
  
Your Appointments 7/31/2018  9:30 AM  
PHYSICAL PRE OP with Mat Barr III, DO [facility] [address]) Appt Note: my chart physical Sc 07/02/18  
 [address] [address]  
[address] [place] [address] [address] [place] [address] Upcoming Health Maintenance Date Due  
 MEDICARE YEARLY EXAM 4/12/2018 Influenza Age 5 to Adult 8/1/2018 FOOT EXAM Q1 9/28/2018 HEMOGLOBIN A1C Q6M 10/24/2018 Pneumococcal 19-64 Highest Risk (3 of 3 - PCV13) 11/14/2018 EYE EXAM RETINAL OR DILATED Q1 3/27/2019 MICROALBUMIN Q1 4/24/2019 LIPID PANEL Q1 4/24/2019 PAP AKA CERVICAL CYTOLOGY 10/9/2020 DTaP/Tdap/Td series (2 - Td) 5/29/2028 Allergies as of 7/24/2018  Review Complete On: 7/24/2018 By: María Han MD  
  
 Severity Noted Reaction Type Reactions Zocor [Simvastatin] Medium 11/20/2009   Intolerance Myalgia Lisinopril  05/26/2011    Cough Current Immunizations  Never Reviewed Name Date Influenza Vaccine (Quad) PF 11/14/2017 Influenza Vaccine Split 10/1/2012 Pneumococcal Polysaccharide (PPSV-23) 11/14/2017 Tdap 5/29/2018 10:19 AM, 12/15/2017 Not reviewed this visit You Were Diagnosed With   
  
 Codes Comments Diabetic nephropathy associated with type 1 diabetes mellitus ([facility] [address]    -  Primary ICD-10-CM: E10.21 ICD-9-CM: 250.41, 583.81 Essential hypertension     ICD-10-CM: I10 
ICD-9-CM: 401.9 Hyperlipidemia LDL goal <100     ICD-10-CM: E78.5 ICD-9-CM: 272.4 Elevated LFTs     ICD-10-CM: R79.89 ICD-9-CM: 790.6 Vitals BP Pulse Height(growth percentile) Weight(growth percentile) BMI OB Status 126/68 99 5' 5\" (1.651 m) 199 lb 12.8 oz (90.6 kg) 33.25 kg/m2 Implant Smoking Status Current Every Day Smoker Vitals History BMI and BSA Data Body Mass Index Body Surface Area  
 33.25 kg/m 2 2.04 m 2 Preferred Pharmacy Pharmacy Name Phone [facility] [address] , [address] [facility] [phone] Your Updated Medication List  
  
   
This list is accurate as of 7/24/18 10:27 AM.  Always use your most recent med list.  
  
  
  
  
 acetaminophen 325 mg tablet Commonly known as:  TYLENOL Take 2 Tabs by mouth every six (6) hours as needed for Fever. albuterol 90 mcg/actuation inhaler Commonly known as:  PROVENTIL HFA, VENTOLIN HFA, PROAIR HFA Take 1 Puff by inhalation every four (4) hours as needed for Wheezing. ARIPiprazole 30 mg tablet Commonly known as:  ABILIFY Take 30 mg by mouth daily. atorvastatin 40 mg tablet Commonly known as:  LIPITOR Take 1 Tab by mouth daily. carvedilol 12.5 mg tablet Commonly known as:  Vergia Kapil Take 12.5 mg by mouth daily. EFFEXOR  mg capsule Generic drug:  venlafaxine-SR Take 150 mg by mouth two (2) times a day. fentaNYL 100 mcg/hr PATCH Commonly known as:  DURAGESIC  
1 Patch by TransDERmal route every seventy-two (72) hours. FREESTYLE MARIELA SENSOR Kit Generic drug:  flash glucose sensor Use 1 sensor every 10 days as directed  
  
 furosemide 40 mg tablet Commonly known as:  LASIX  
2 tablets tid daily--Dose change 7/24/18--updated med list--did not send prescription to the pharmacy  
  
 gabapentin 300 mg capsule Commonly known as:  NEURONTIN Take 2 Caps by mouth three (3) times daily. glucagon 1 mg injection Commonly known as:  GLUCAGON EMERGENCY KIT (HUMAN) Use as directed HumaLOG KwikPen Insulin 100 unit/mL kwikpen Generic drug:  insulin lispro 1:15 for carbs and 1:30 > 150 for correction during the day and > 180 at bedtime. Max 50 units per day  
  
 insulin glargine 100 unit/mL (3 mL) Inpn Commonly known as:  LANTUS SOLOSTAR U-100 INSULIN  
36 Units by SubCUTAneous route daily. Dose change 7/24/18--updated med list--did not send prescription to the pharmacy Insulin Needles (Disposable) 31 gauge x 3/16\" Ndle Commonly known as:  BD ULTRA-FINE MINI PEN NEEDLE Use as directed up to 4 times daily LORazepam 1 mg tablet Commonly known as:  ATIVAN Take 1 mg by mouth three (3) times daily. magnesium oxide 400 mg tablet Commonly known as:  MAG-OX Take 800 mg by mouth two (2) times a day. metOLazone 2.5 mg tablet Commonly known as:  Earleen Schlichter Take 2.5 mg by mouth daily. NORCO  mg tablet Generic drug:  HYDROcodone-acetaminophen Take 1 Tab by mouth every eight (8) hours as needed. One Touch Delica 33 gauge Misc Generic drug:  lancets Test 8 times daily--DX E10.65  
  
 potassium chloride 20 mEq tablet Commonly known as:  K-DUR, KLOR-CON Take 20 mEq by mouth daily. Prescriptions Sent to Pharmacy Refills FREESTYLE MARIELA SENSOR kit 11 Sig: Use 1 sensor every 10 days as directed Class: Normal  
 Pharmacy: [address] Ph #: [phone] We Performed the Following AMB POC HEMOGLOBIN A1C [71426 CPT(R)] COLLECTION VENOUS BLOOD,VENIPUNCTURE Q5337440 CPT(R)] METABOLIC PANEL, COMPREHENSIVE [75153 CPT(R)] GA HANDLG&/OR CONVEY OF SPEC FOR TR OFFICE TO LAB [65305 CPT(R)] Follow-up Instructions Return for 10/30/18 at 9:30am.  
  
  
Patient Instructions 1) I learned that with Medicare patients we may need to go through a durable medical equipment (DME) supplier to get the sensors. You can contact Fatemeh Maher Dr at [phone] or Tori Hirsch [phone] to have them fax me a form to get the sensors if they aren't covered at [address]. 2) Your A1c went from 8.2% to 9% so hopefully the sensor will help you have better control with less fluctuation. 3) You can try the freestyle ellen to help monitor your blood sugar more closely. You will apply a new sensor every 10 days and be sure to try and wave the reader over the sensor 3 times a day if possible to know how your sugar is running over the previous 8 hours and the sensor will store up to 90 days of data. Try to check up to 6 times daily before and 2 hours after each meal so you can see the effect of food on your sugar. 4) Decrease the lantus to 34 units starting tomorrow. 5) I will send you a message with the rest of your labs. Introducing [facility] & HEALTH SERVICES! Dear Juliet Morris: 
Thank you for requesting a Ceba[employer] account. Our records indicate that you already have an active [employer] account. You can access your account anytime at [email or web address] Did you know that you can access your hospital and ER discharge instructions at any time in CebaTech? You can also review all of your test results from your hospital stay or ER visit. Additional Information If you have questions, please visit the Frequently Asked Questions section of the Ceba[employer] website at [email or web address] Remember, CebaTech is NOT to be used for urgent needs. For medical emergencies, dial 911. Now available from your iPhone and Android! Please provide this summary of care documentation to your next provider. Your primary care clinician is listed as Fidel Fox If you have any questions after today's visit, please call [phone].

## 2018-07-24 NOTE — TELEPHONE ENCOUNTER
Will Segovia left a voicemail and stated that she hit the wall and knocked her sensor out of her arm. She would like to know if she could obtain a new sensor. .    Dr. Donaldo Guan was notified and patient was informed that she could have a new kit due to we do not have just the sensor. Patient voiced understanding .

## 2018-07-24 NOTE — TELEPHONE ENCOUNTER
Please let her know that when she puts in a new sensor this afternoon, she can still use the original reader and now she will have a back-up reader. She will have to scan the reader over the sensor to start a new 12 hour waiting period before the reader will give her any data for review.

## 2018-07-24 NOTE — PATIENT INSTRUCTIONS
1) I learned that with Medicare patients we may need to go through a durable medical equipment (DME) supplier to get the sensors. You can contact Fatemeh Maher Dr at [phone] or Maya Gonzalez [phone] ext [phone] to have them fax me a form to get the sensors if they aren't covered at [facility]. 2) Your A1c went from 8.2% to 9% so hopefully the sensor will help you have better control with less fluctuation. 3) You can try the freestyle ellen to help monitor your blood sugar more closely. You will apply a new sensor every 10 days and be sure to try and wave the reader over the sensor 3 times a day if possible to know how your sugar is running over the previous 8 hours and the sensor will store up to 90 days of data. Try to check up to 6 times daily before and 2 hours after each meal so you can see the effect of food on your sugar. 4) Decrease the lantus to 34 units starting tomorrow. 5) I will send you a message with the rest of your labs.

## 2018-07-25 LAB
ALBUMIN SERPL-MCNC: 4.6 G/DL (ref 3.5–5.5)
ALBUMIN/GLOB SERPL: 1.8 {RATIO} (ref 1.2–2.2)
ALP SERPL-CCNC: 95 IU/L (ref 39–117)
ALT SERPL-CCNC: 28 IU/L (ref 0–32)
AST SERPL-CCNC: 29 IU/L (ref 0–40)
BILIRUB SERPL-MCNC: <0.2 MG/DL (ref 0–1.2)
BUN SERPL-MCNC: 15 MG/DL (ref 6–24)
BUN/CREAT SERPL: 12 (ref 9–23)
CALCIUM SERPL-MCNC: 9.5 MG/DL (ref 8.7–10.2)
CHLORIDE SERPL-SCNC: 92 MMOL/L (ref 96–106)
CO2 SERPL-SCNC: 24 MMOL/L (ref 20–29)
CREAT SERPL-MCNC: 1.22 MG/DL (ref 0.57–1)
GLOBULIN SER CALC-MCNC: 2.6 G/DL (ref 1.5–4.5)
GLUCOSE SERPL-MCNC: 129 MG/DL (ref 65–99)
INTERPRETATION: NORMAL
POTASSIUM SERPL-SCNC: 4.6 MMOL/L (ref 3.5–5.2)
PROT SERPL-MCNC: 7.2 G/DL (ref 6–8.5)
SODIUM SERPL-SCNC: 135 MMOL/L (ref 134–144)

## 2018-07-30 ENCOUNTER — HOSPITAL ENCOUNTER (EMERGENCY)
Age: 48
Discharge: HOME | End: 2018-07-30
Attending: EMERGENCY MEDICINE
Payer: MEDICARE

## 2018-07-30 ENCOUNTER — APPOINTMENT (OUTPATIENT)
Dept: CT IMAGING | Age: 48
End: 2018-07-30
Attending: EMERGENCY MEDICINE
Payer: MEDICARE

## 2018-07-30 ENCOUNTER — TELEPHONE (OUTPATIENT)
Dept: ENDOCRINOLOGY | Age: 48
End: 2018-07-30

## 2018-07-30 VITALS
RESPIRATION RATE: 16 BRPM | TEMPERATURE: 98.1 F | OXYGEN SATURATION: 90 % | BODY MASS INDEX: 32.65 KG/M2 | SYSTOLIC BLOOD PRESSURE: 96 MMHG | HEIGHT: 65 IN | DIASTOLIC BLOOD PRESSURE: 56 MMHG | WEIGHT: 196 LBS | HEART RATE: 86 BPM

## 2018-07-30 DIAGNOSIS — R73.9 HYPERGLYCEMIA: ICD-10-CM

## 2018-07-30 DIAGNOSIS — R40.0 DROWSINESS: ICD-10-CM

## 2018-07-30 DIAGNOSIS — R47.81 SLURRED SPEECH: ICD-10-CM

## 2018-07-30 DIAGNOSIS — T50.905A MEDICATION SIDE EFFECT, INITIAL ENCOUNTER: Primary | ICD-10-CM

## 2018-07-30 LAB
ALBUMIN SERPL-MCNC: 3.8 G/DL (ref 3.5–5)
ALBUMIN/GLOB SERPL: 1.1 {RATIO} (ref 1.1–2.2)
ALP SERPL-CCNC: 111 U/L (ref 45–117)
ALT SERPL-CCNC: 28 U/L (ref 12–78)
AMMONIA PLAS-SCNC: 13 UMOL/L
AMPHET UR QL SCN: NEGATIVE
ANION GAP SERPL CALC-SCNC: 10 MMOL/L (ref 5–15)
APPEARANCE UR: CLEAR
AST SERPL-CCNC: 49 U/L (ref 15–37)
BACTERIA URNS QL MICRO: NEGATIVE /HPF
BARBITURATES UR QL SCN: NEGATIVE
BASOPHILS # BLD: 0 K/UL (ref 0–0.1)
BASOPHILS NFR BLD: 0 % (ref 0–1)
BENZODIAZ UR QL: NEGATIVE
BILIRUB SERPL-MCNC: 0.5 MG/DL (ref 0.2–1)
BILIRUB UR QL: NEGATIVE
BUN SERPL-MCNC: 29 MG/DL (ref 6–20)
BUN/CREAT SERPL: 19 (ref 12–20)
CALCIUM SERPL-MCNC: 8.7 MG/DL (ref 8.5–10.1)
CANNABINOIDS UR QL SCN: NEGATIVE
CHLORIDE SERPL-SCNC: 80 MMOL/L (ref 97–108)
CO2 SERPL-SCNC: 31 MMOL/L (ref 21–32)
COCAINE UR QL SCN: NEGATIVE
COLOR UR: ABNORMAL
CREAT SERPL-MCNC: 1.53 MG/DL (ref 0.55–1.02)
DIFFERENTIAL METHOD BLD: ABNORMAL
DRUG SCRN COMMENT,DRGCM: NORMAL
EOSINOPHIL # BLD: 0 K/UL (ref 0–0.4)
EOSINOPHIL NFR BLD: 0 % (ref 0–7)
EPITH CASTS URNS QL MICRO: ABNORMAL /LPF
ERYTHROCYTE [DISTWIDTH] IN BLOOD BY AUTOMATED COUNT: 18.8 % (ref 11.5–14.5)
ETHANOL SERPL-MCNC: <10 MG/DL
GLOBULIN SER CALC-MCNC: 3.5 G/DL (ref 2–4)
GLUCOSE BLD STRIP.AUTO-MCNC: 187 MG/DL (ref 65–100)
GLUCOSE BLD STRIP.AUTO-MCNC: 415 MG/DL (ref 65–100)
GLUCOSE SERPL-MCNC: 298 MG/DL (ref 65–100)
GLUCOSE UR STRIP.AUTO-MCNC: 250 MG/DL
HCT VFR BLD AUTO: 33.8 % (ref 35–47)
HGB BLD-MCNC: 11.7 G/DL (ref 11.5–16)
HGB UR QL STRIP: ABNORMAL
HYALINE CASTS URNS QL MICRO: ABNORMAL /LPF (ref 0–5)
IMM GRANULOCYTES # BLD: 0 K/UL (ref 0–0.04)
IMM GRANULOCYTES NFR BLD AUTO: 0 % (ref 0–0.5)
KETONES UR QL STRIP.AUTO: NEGATIVE MG/DL
LEUKOCYTE ESTERASE UR QL STRIP.AUTO: ABNORMAL
LYMPHOCYTES # BLD: 1.4 K/UL (ref 0.8–3.5)
LYMPHOCYTES NFR BLD: 23 % (ref 12–49)
MCH RBC QN AUTO: 29.8 PG (ref 26–34)
MCHC RBC AUTO-ENTMCNC: 34.6 G/DL (ref 30–36.5)
MCV RBC AUTO: 86.2 FL (ref 80–99)
METHADONE UR QL: NEGATIVE
MONOCYTES # BLD: 0.6 K/UL (ref 0–1)
MONOCYTES NFR BLD: 10 % (ref 5–13)
NEUTS SEG # BLD: 4 K/UL (ref 1.8–8)
NEUTS SEG NFR BLD: 67 % (ref 32–75)
NITRITE UR QL STRIP.AUTO: NEGATIVE
NRBC # BLD: 0 K/UL (ref 0–0.01)
NRBC BLD-RTO: 0 PER 100 WBC
OPIATES UR QL: NEGATIVE
PCP UR QL: NEGATIVE
PH UR STRIP: 6 [PH] (ref 5–8)
PLATELET # BLD AUTO: 241 K/UL (ref 150–400)
PMV BLD AUTO: 9.2 FL (ref 8.9–12.9)
POTASSIUM SERPL-SCNC: 3 MMOL/L (ref 3.5–5.1)
PROT SERPL-MCNC: 7.3 G/DL (ref 6.4–8.2)
PROT UR STRIP-MCNC: NEGATIVE MG/DL
RBC # BLD AUTO: 3.92 M/UL (ref 3.8–5.2)
RBC #/AREA URNS HPF: ABNORMAL /HPF (ref 0–5)
SERVICE CMNT-IMP: ABNORMAL
SERVICE CMNT-IMP: ABNORMAL
SODIUM SERPL-SCNC: 121 MMOL/L (ref 136–145)
SP GR UR REFRACTOMETRY: 1.01 (ref 1–1.03)
UA: UC IF INDICATED,UAUC: ABNORMAL
UROBILINOGEN UR QL STRIP.AUTO: 0.2 EU/DL (ref 0.2–1)
WBC # BLD AUTO: 6 K/UL (ref 3.6–11)
WBC URNS QL MICRO: ABNORMAL /HPF (ref 0–4)

## 2018-07-30 PROCEDURE — 80307 DRUG TEST PRSMV CHEM ANLYZR: CPT | Performed by: EMERGENCY MEDICINE

## 2018-07-30 PROCEDURE — 74011636637 HC RX REV CODE- 636/637: Performed by: EMERGENCY MEDICINE

## 2018-07-30 PROCEDURE — 96375 TX/PRO/DX INJ NEW DRUG ADDON: CPT

## 2018-07-30 PROCEDURE — 80053 COMPREHEN METABOLIC PANEL: CPT | Performed by: EMERGENCY MEDICINE

## 2018-07-30 PROCEDURE — 81001 URINALYSIS AUTO W/SCOPE: CPT | Performed by: EMERGENCY MEDICINE

## 2018-07-30 PROCEDURE — 70450 CT HEAD/BRAIN W/O DYE: CPT

## 2018-07-30 PROCEDURE — 82962 GLUCOSE BLOOD TEST: CPT

## 2018-07-30 PROCEDURE — 74011250636 HC RX REV CODE- 250/636: Performed by: EMERGENCY MEDICINE

## 2018-07-30 PROCEDURE — 96374 THER/PROPH/DIAG INJ IV PUSH: CPT

## 2018-07-30 PROCEDURE — 99284 EMERGENCY DEPT VISIT MOD MDM: CPT

## 2018-07-30 PROCEDURE — 82140 ASSAY OF AMMONIA: CPT | Performed by: EMERGENCY MEDICINE

## 2018-07-30 PROCEDURE — 85025 COMPLETE CBC W/AUTO DIFF WBC: CPT | Performed by: EMERGENCY MEDICINE

## 2018-07-30 PROCEDURE — 36415 COLL VENOUS BLD VENIPUNCTURE: CPT | Performed by: EMERGENCY MEDICINE

## 2018-07-30 RX ORDER — METOCLOPRAMIDE 5 MG/1
5 TABLET ORAL
COMMUNITY
End: 2018-10-30

## 2018-07-30 RX ORDER — KETOROLAC TROMETHAMINE 30 MG/ML
15 INJECTION, SOLUTION INTRAMUSCULAR; INTRAVENOUS
Status: COMPLETED | OUTPATIENT
Start: 2018-07-30 | End: 2018-07-30

## 2018-07-30 RX ORDER — LORAZEPAM 1 MG/1
1 TABLET ORAL
Status: DISCONTINUED | OUTPATIENT
Start: 2018-07-30 | End: 2018-07-30

## 2018-07-30 RX ADMIN — INSULIN HUMAN 5 UNITS: 100 INJECTION, SOLUTION PARENTERAL at 10:46

## 2018-07-30 RX ADMIN — KETOROLAC TROMETHAMINE 15 MG: 30 INJECTION, SOLUTION INTRAMUSCULAR at 10:46

## 2018-07-30 NOTE — ED NOTES
MD Lori Lantigua reviewed discharge instructions with the patient. The patient verbalized understanding. Patient discharged home with stable vitals. Patient out of ED via wheelchair with discharge instructions in hand. Opportunity for questions and clarification provided. No further complaints noted at this time.

## 2018-07-30 NOTE — DISCHARGE INSTRUCTIONS
Please take your Ativan TWICE a day instead of 3x a day since that with the gabapentin and depression that is making you drowsy. Side Effects of Medicine: Care Instructions  Your Care Instructions  Medicines are a big part of treatment for many health problems. But all medicines have side effects. Often these are mild problems. They might include a dry mouth or upset stomach. But sometimes medicines can cause dangerous side effects. One example is a bad allergic reaction. The best treatment will depend on what side effects you have. If you have a serious side effect, you may need to stop taking the medicine. You may also need to take another medicine to treat the side effect. If you have a mild side effect, it may go away after you take the medicine for a while. The doctor has checked you carefully, but problems can develop later. If you notice any problems or new symptoms, get medical treatment right away. Follow-up care is a key part of your treatment and safety. Be sure to make and go to all appointments, and call your doctor if you are having problems. It's also a good idea to know your test results and keep a list of the medicines you take. How can you care for yourself at home? · Be safe with medicines. Take your medicines exactly as prescribed. Call your doctor if you think you are having a problem with your medicine. · Call your doctor if side effects bother you and you wonder if you should keep taking a medicine. Your doctor may be able to lower your dose or change your medicine. Do not suddenly quit taking your medicine unless a doctor tells you to. · Make sure your doctor has a list of all the medicines, vitamins, supplements, and herbal remedies you take. Ask about side effects. When should you call for help? Call 911 anytime you think you may need emergency care. For example, call if:  · You have symptoms of a severe allergic reaction.  These may include:  ¨ Sudden raised, red areas (hives) all over your body. ¨ Swelling of the throat, mouth, lips, or tongue. ¨ Trouble breathing. ¨ Passing out (losing consciousness). Or you may feel very lightheaded or suddenly feel weak, confused, or restless. Call your doctor now or seek immediate medical care if:  · You have symptoms of an allergic reaction, such as:  ¨ A rash or hives (raised, red areas on the skin). ¨ Itching. ¨ Swelling. ¨ Belly pain, nausea, or vomiting. Watch closely for changes in your health, and be sure to contact your doctor if:  · You think you are having a new problem with your medicine. · You do not get better as expected. Where can you learn more? Go to [employer]oneDe[employer].be  Enter D152 in the search box to learn more about \"Side Effects of Medicine: Care Instructions. \"   © 1000-4477 [employer], [employer]. Care instructions adapted under license by Sally Ruffin (which disclaims liability or warranty for this information). This care instruction is for use with your licensed healthcare professional. If you have questions about a medical condition or this instruction, always ask your healthcare professional. [place] [address] any warranty or liability for your use of this information. Content Version: 21.5.819757; Current as of: November 20, 2015             Learning About High Blood Sugar  What is high blood sugar? Your body turns the food you eat into glucose (sugar), which it uses for energy. But if your body isn't able to use the sugar right away, it can build up in your blood and lead to high blood sugar. When the amount of sugar in your blood stays too high for too much of the time, you may have diabetes. Diabetes is a disease that can cause serious health problems. The good news is that lifestyle changes may help you get your blood sugar back to normal and avoid or delay diabetes. What causes high blood sugar?   Sugar (glucose) can build up in your blood if you:  · Are overweight. · Have a family history of diabetes. · Take certain medicines, such as steroids. What are the symptoms? Having high blood sugar may not cause any symptoms at all. Or it may make you feel very thirsty or very hungry. You may also urinate more often than usual, have blurry vision, or lose weight without trying. How is high blood sugar treated? You can take steps to lower your blood sugar level if you understand what makes it get higher. Your doctor may want you to learn how to test your blood sugar level at home. Then you can see how illness, stress, or different kinds of food or medicine raise or lower your blood sugar level. Other tests may be needed to see if you have diabetes. How can you prevent high blood sugar? · Watch your weight. If you're overweight, losing just a small amount of weight may help. Reducing fat around your waist is most important. · Limit the amount of calories, sweets, and unhealthy fat you eat. Ask your doctor if a dietitian can help you. A registered dietitian can help you create meal plans that fit your lifestyle. · Get at least 30 minutes of exercise on most days of the week. Exercise helps control your blood sugar. It also helps you maintain a healthy weight. Walking is a good choice. You also may want to do other activities, such as running, swimming, cycling, or playing tennis or team sports. · If your doctor prescribed medicines, take them exactly as prescribed. Call your doctor if you think you are having a problem with your medicine. You will get more details on the specific medicines your doctor prescribes. Follow-up care is a key part of your treatment and safety. Be sure to make and go to all appointments, and call your doctor if you are having problems. It's also a good idea to know your test results and keep a list of the medicines you take. Where can you learn more? Go to http://mac-rufus.info/.   Enter O108 in the search box to learn more about \"Learning About High Blood Sugar. \"  Current as of: December 7, 2017  Content Version: 11.7  © 8877-3691 [employer]. Care instructions adapted under license by [employer] (which disclaims liability or warranty for this information). If you have questions about a medical condition or this instruction, always ask your healthcare professional. [address] any warranty or liability for your use of this information.

## 2018-07-30 NOTE — ED PROVIDER NOTES
EMERGENCY DEPARTMENT HISTORY AND PHYSICAL EXAM      Date: 7/30/2018  Patient Name: Derik Rosa    History of Presenting Illness     Chief Complaint   Patient presents with    High Blood Sugar     Pt arrives via ambulatory c/o slurred speech x Friday with lethargy and increased BS Pt BS today 430 Pt took 32 units Lantus and 10 units corrective PTA     History Provided By: Patient    HPI: Derik Rosa, 53 y.o. female with PMHx significant for DM type I, hyperlipidemia, panic attacks, PTSD, presents ambulatory to the ED with cc of new onset moderate edema of BL extremities, ongoing since 7/26/18. Pt reports slurred speech, diarrhea, HA, lower right sided back pain, elevated blood sugar and decreased urine alongside cc. She notes to have had a GLF in the bathroom on 7/28/18, landing on her back and hitting her head but denies any LOC. Pt endorses to taking 10 units of Humalog and 32 units of Lantus at 4:30 AM today. Per pt, she had DKA in 4/2018 and 11/2017. She states that she is scheduled for an US of her liver due to elevated liver enzymes. Pt denies being on her feet constantly recently. She denies any signs of infection or hx of liver problems. Pt specifically denies any fevers, cough, chills, abdominal pain, CP, or dysuria. Chief Complaint: edema  Duration: 5 Days  Timing:  new onset  Location: BL lower extremities  Quality: N/A  Severity: Moderate  Modifying Factors: denies any modifying factors  Associated Symptoms: slurred speech, diarrhea, HA, lower right side back pain, elevated blood sugar, decreased urine    There are no other complaints, changes, or physical findings at this time. PCP: Craig Calabrese III, DO    Current Outpatient Prescriptions   Medication Sig Dispense Refill    metoclopramide HCl (REGLAN) 5 mg tablet Take 5 mg by mouth Before breakfast, lunch, and dinner.  ARIPiprazole (ABILIFY) 30 mg tablet Take 30 mg by mouth daily.       LORazepam (ATIVAN) 1 mg tablet Take 1 mg by mouth three (3) times daily.  fentaNYL (DURAGESIC) 100 mcg/hr PATCH 1 Patch by TransDERmal route every seventy-two (72) hours.  carvedilol (COREG) 12.5 mg tablet Take 12.5 mg by mouth daily.  FREESTYLE MARIELA SENSOR kit Use 1 sensor every 10 days as directed 3 Each 11    insulin glargine (LANTUS SOLOSTAR U-100 INSULIN) 100 unit/mL (3 mL) inpn 34 Units by SubCUTAneous route daily. Dose change 7/24/18--updated med list--did not send prescription to the pharmacy 15 mL 1    albuterol (PROVENTIL HFA, VENTOLIN HFA, PROAIR HFA) 90 mcg/actuation inhaler Take 1 Puff by inhalation every four (4) hours as needed for Wheezing. 1 Inhaler 1    HYDROcodone-acetaminophen (NORCO)  mg tablet Take 1 Tab by mouth every eight (8) hours as needed.  acetaminophen (TYLENOL) 325 mg tablet Take 2 Tabs by mouth every six (6) hours as needed for Fever. 30 Tab 0    gabapentin (NEURONTIN) 300 mg capsule Take 2 Caps by mouth three (3) times daily.  Insulin Needles, Disposable, (BD INSULIN PEN NEEDLE UF MINI) 31 gauge x 3/16\" ndle Use as directed up to 4 times daily 100 Pen Needle 11    furosemide (LASIX) 40 mg tablet 2 tablets tid daily--Dose change 7/24/18--updated med list--did not send prescription to the pharmacy      potassium chloride (K-DUR, KLOR-CON) 20 mEq tablet Take 20 mEq by mouth daily.  metOLazone (ZAROXOLYN) 2.5 mg tablet Take 2.5 mg by mouth daily.  glucagon (GLUCAGON EMERGENCY KIT, HUMAN,) 1 mg injection Use as directed 2 Kit 11    magnesium oxide (MAG-OX) 400 mg tablet Take 800 mg by mouth two (2) times a day.  atorvastatin (LIPITOR) 40 mg tablet Take 1 Tab by mouth daily. 30 Tab 11    venlafaxine-SR (EFFEXOR XR) 150 mg capsule Take 150 mg by mouth two (2) times a day.  insulin lispro (HUMALOG KWIKPEN) 100 unit/mL kwikpen 1:15 for carbs and 1:30 > 150 for correction during the day and > 180 at bedtime.   Max 50 units per day Gralla 48 33 gauge misc Test 8 times daily--DX E10.65 250 Lancet 11       Past History     Past Medical History:  Past Medical History:   Diagnosis Date    Achilles tendon rupture     along with torn right patellar/femoral ligament    Arthritis     rt. foot    Chronic pain     abdominal pain    Diabetes (HCC)     IDDM on insulin pump and sensor    DM type 1 (diabetes mellitus, type 1) (HCC)     age 25    Endometriosis     s/p ex-lap 4x    Gastric ulcer     GERD (gastroesophageal reflux disease)     Herpes     Other and unspecified hyperlipidemia     Panic attacks     Psychiatric disorder     anxiety, panic attacks    PTSD (post-traumatic stress disorder)     Unspecified essential hypertension        Past Surgical History:  Past Surgical History:   Procedure Laterality Date    HX GYN      2 d&c's    HX GYN      laparoscopies x3    HX ORTHOPAEDIC  2002    achiles tendon repair,talus repair    HX ORTHOPAEDIC      injections x2 to right shoulder       Family History:  Family History   Problem Relation Age of Onset    Diabetes Mother      diet controlled    Diabetes Father      R foot amupation    Liver Disease Father     Heart Disease Paternal Uncle      MI in his 46s    Stroke Neg Hx        Social History:  Social History   Substance Use Topics    Smoking status: Current Every Day Smoker     Packs/day: 1.50     Years: 17.00     Types: Cigarettes     Last attempt to quit: 11/28/2017    Smokeless tobacco: Never Used      Comment: Trying to quit    Alcohol use No      Comment: a beer every few months       Allergies: Allergies   Allergen Reactions    Zocor [Simvastatin] Myalgia    Lisinopril Cough     Review of Systems   Review of Systems   Constitutional: Negative for chills and fever. Respiratory: Negative for cough and shortness of breath. Cardiovascular: Negative for chest pain. Gastrointestinal: Positive for diarrhea. Negative for constipation, nausea and vomiting.    Genitourinary: Positive for difficulty urinating. Musculoskeletal: Positive for back pain (lower R side). (+)BL lower extremities edema   Neurological: Positive for speech difficulty (slurred) and headaches. Negative for weakness and numbness. All other systems reviewed and are negative. Physical Exam   Physical Exam   Constitutional: She is oriented to person, place, and time. She appears well-developed and well-nourished. HENT:   Head: Normocephalic and atraumatic. Eyes: Conjunctivae and EOM are normal.   Neck: Normal range of motion. Neck supple. Cardiovascular: Normal rate and regular rhythm. Pulmonary/Chest: Effort normal and breath sounds normal. No respiratory distress. Abdominal: Soft. She exhibits no distension. There is no tenderness. Musculoskeletal: Normal range of motion. Trace pitting edema BL extremities    Neurological: She is alert and oriented to person, place, and time. Slurred speech  drowsy    Skin: Skin is warm and dry. Psychiatric: She has a normal mood and affect. Nursing note and vitals reviewed. Diagnostic Study Results     Labs -     Recent Results (from the past 12 hour(s))   GLUCOSE, POC    Collection Time: 07/30/18 10:03 AM   Result Value Ref Range    Glucose (POC) 415 (H) 65 - 100 mg/dL    Performed by Tristan Wing (SON)    CBC WITH AUTOMATED DIFF    Collection Time: 07/30/18 10:47 AM   Result Value Ref Range    WBC 6.0 3.6 - 11.0 K/uL    RBC 3.92 3.80 - 5.20 M/uL    HGB 11.7 11.5 - 16.0 g/dL    HCT 33.8 (L) 35.0 - 47.0 %    MCV 86.2 80.0 - 99.0 FL    MCH 29.8 26.0 - 34.0 PG    MCHC 34.6 30.0 - 36.5 g/dL    RDW 18.8 (H) 11.5 - 14.5 %    PLATELET 367 337 - 566 K/uL    MPV 9.2 8.9 - 12.9 FL    NRBC 0.0 0  WBC    ABSOLUTE NRBC 0.00 0.00 - 0.01 K/uL    NEUTROPHILS 67 32 - 75 %    LYMPHOCYTES 23 12 - 49 %    MONOCYTES 10 5 - 13 %    EOSINOPHILS 0 0 - 7 %    BASOPHILS 0 0 - 1 %    IMMATURE GRANULOCYTES 0 0.0 - 0.5 %    ABS. NEUTROPHILS 4.0 1.8 - 8.0 K/UL    ABS. LYMPHOCYTES 1.4 0.8 - 3.5 K/UL    ABS. MONOCYTES 0.6 0.0 - 1.0 K/UL    ABS. EOSINOPHILS 0.0 0.0 - 0.4 K/UL    ABS. BASOPHILS 0.0 0.0 - 0.1 K/UL    ABS. IMM. GRANS. 0.0 0.00 - 0.04 K/UL    DF AUTOMATED     METABOLIC PANEL, COMPREHENSIVE    Collection Time: 07/30/18 10:47 AM   Result Value Ref Range    Sodium 121 (L) 136 - 145 mmol/L    Potassium 3.0 (L) 3.5 - 5.1 mmol/L    Chloride 80 (L) 97 - 108 mmol/L    CO2 31 21 - 32 mmol/L    Anion gap 10 5 - 15 mmol/L    Glucose 298 (H) 65 - 100 mg/dL    BUN 29 (H) 6 - 20 MG/DL    Creatinine 1.53 (H) 0.55 - 1.02 MG/DL    BUN/Creatinine ratio 19 12 - 20      GFR est AA 44 (L) >60 ml/min/1.73m2    GFR est non-AA 36 (L) >60 ml/min/1.73m2    Calcium 8.7 8.5 - 10.1 MG/DL    Bilirubin, total 0.5 0.2 - 1.0 MG/DL    ALT (SGPT) 28 12 - 78 U/L    AST (SGOT) 49 (H) 15 - 37 U/L    Alk.  phosphatase 111 45 - 117 U/L    Protein, total 7.3 6.4 - 8.2 g/dL    Albumin 3.8 3.5 - 5.0 g/dL    Globulin 3.5 2.0 - 4.0 g/dL    A-G Ratio 1.1 1.1 - 2.2     AMMONIA    Collection Time: 07/30/18 10:47 AM   Result Value Ref Range    Ammonia 13 <32 UMOL/L   URINALYSIS W/ REFLEX CULTURE    Collection Time: 07/30/18 10:47 AM   Result Value Ref Range    Color YELLOW/STRAW      Appearance CLEAR CLEAR      Specific gravity 1.008 1.003 - 1.030      pH (UA) 6.0 5.0 - 8.0      Protein NEGATIVE  NEG mg/dL    Glucose 250 (A) NEG mg/dL    Ketone NEGATIVE  NEG mg/dL    Bilirubin NEGATIVE  NEG      Blood TRACE (A) NEG      Urobilinogen 0.2 0.2 - 1.0 EU/dL    Nitrites NEGATIVE  NEG      Leukocyte Esterase TRACE (A) NEG      WBC 0-4 0 - 4 /hpf    RBC 0-5 0 - 5 /hpf    Epithelial cells MODERATE (A) FEW /lpf    Bacteria NEGATIVE  NEG /hpf    UA:UC IF INDICATED CULTURE NOT INDICATED BY UA RESULT CNI      Hyaline cast 0-2 0 - 5 /lpf   DRUG SCREEN, URINE    Collection Time: 07/30/18 10:47 AM   Result Value Ref Range    AMPHETAMINES NEGATIVE  NEG      BARBITURATES NEGATIVE  NEG      BENZODIAZEPINES NEGATIVE  NEG      COCAINE NEGATIVE  NEG      METHADONE NEGATIVE  NEG      OPIATES NEGATIVE  NEG      PCP(PHENCYCLIDINE) NEGATIVE  NEG      THC (TH-CANNABINOL) NEGATIVE  NEG      Drug screen comment (NOTE)    ETHYL ALCOHOL    Collection Time: 07/30/18 10:47 AM   Result Value Ref Range    ALCOHOL(ETHYL),SERUM <10 <10 MG/DL   GLUCOSE, POC    Collection Time: 07/30/18 11:26 AM   Result Value Ref Range    Glucose (POC) 187 (H) 65 - 100 mg/dL    Performed by ASHLI ROBERTS (PCT)        Radiologic Studies -   CT Results  (Last 48 hours)               07/30/18 1027  CT HEAD WO CONT Final result    Impression:  IMPRESSION:       No acute process. Narrative:  INDICATION:   Confusion/delirium, altered LOC, unexplained       EXAM:  HEAD CT WITHOUT CONTRAST       COMPARISON:  November 28, 2017       TECHNIQUE:  Routine noncontrast axial head CT was performed. Sagittal and   coronal reconstructions were generated. CT dose reduction was achieved through use of a standardized protocol tailored   for this examination and automatic exposure control for dose modulation. FINDINGS:       Ventricles:  Midline, no hydrocephalus. Intracranial Hemorrhage:  None. Brain Parenchyma/Brainstem:  Normal for age. Basal Cisterns:  Normal.    Paranasal Sinuses:  Peripheral mucosal thickening/fluid left maxillary sinus   partly visualized. Additional Comments:  N/A. Medical Decision Making   I am the first provider for this patient. I reviewed the vital signs, available nursing notes, past medical history, past surgical history, family history and social history. Vital Signs-Reviewed the patient's vital signs.   Patient Vitals for the past 12 hrs:   Temp Pulse Resp BP SpO2   07/30/18 1146 - 86 16 - 90 %   07/30/18 1130 - 85 15 96/56 98 %   07/30/18 0952 98.1 °F (36.7 °C) 90 14 127/70 94 %     Pulse Oximetry Analysis - 94% on RA    Records Reviewed: Nursing Notes, Old Medical Records and Previous Laboratory Studies    Provider Notes (Medical Decision Making):   Patient presenting with slurred speech, drowsiness and high sugars. DDx: DKA, hyperglycemia, medication toxicity, infection, anemia, electrolyte/metabolic anomoly, hypercapnea, stroke/bleed/mass, dehydration, illicit drug intoxication. Drowsiness likely due to Ativan, gabapentin, Abilify use. Will obtain labwork, UA, and imaging. ED Course:   Initial assessment performed. The patients presenting problems have been discussed, and they are in agreement with the care plan formulated and outlined with them. I have encouraged them to ask questions as they arise throughout their visit. Critical Care Time: 0    Disposition:  Discharge Note:  11:35 PM  The pt is ready for discharge. The pt's signs, symptoms, diagnosis, and discharge instructions have been discussed and pt has conveyed their understanding. The pt is to follow up as recommended or return to ER should their symptoms worsen. Plan has been discussed and pt is in agreement. PLAN:  1. Discharge Medication List as of 7/30/2018 11:35 AM        2. Follow-up Information     Follow up With Details Comments [address], [place] Schedule an appointment as soon as possible for a visit  [address],   About drowsiness [address]  [facility] [address] [place] [address]  [phone]          Return to ED if worse   Diagnosis     Clinical Impression:   1. Medication side effect, initial encounter    2. Drowsiness    3. Slurred speech    4. Hyperglycemia        Attestations: This note is prepared by Dena Alvarez, acting as a Scribe for James Croft MD.    James Croft MD  : The scribe's documentation has been prepared under my direction and personally reviewed by me in its entirety.  I confirm that the notes above accurately reflects all work, treatment, procedures, and medical decision making performed by me.

## 2018-07-30 NOTE — ED NOTES
Patient states Saturday, her boyfriend noticed that she was [place] different. \" including increased thirst, sleep, and slurred speech. Patient states she noticed increased swelling to both legs on Thursday and had a liver ultrasound set for last week, which she was not able to complete and has it rescheduled for next week. She also reports a fall in the bathroom in which she did hit her head, but did not lose consciousness. Patient states her blood sugar was 430 this morning and treated it with 32 units of lantus and 10 units of humalog. Patient on the monitor x3, call bell within reach. Side rails x2. Family at bedside. MD Lori Lantigua at bedside to evaluate patient.

## 2018-08-02 ENCOUNTER — TELEPHONE (OUTPATIENT)
Dept: ENDOCRINOLOGY | Age: 48
End: 2018-08-02

## 2018-08-02 NOTE — TELEPHONE ENCOUNTER
Can you check on her and see how she is feeling since her ER visit on Monday? How are her sugars running? Is she still having any slurred speech?

## 2018-08-02 NOTE — TELEPHONE ENCOUNTER
Patient is currently at [address] [place] [address] since Tuesday due to her calcium and potassium were abnormal.

## 2018-08-03 ENCOUNTER — TELEPHONE (OUTPATIENT)
Dept: ENDOCRINOLOGY | Age: 48
End: 2018-08-03

## 2018-08-03 NOTE — TELEPHONE ENCOUNTER
Patient wanted Dr. Kenneth Fraga to know she is home and she is feeling better. She stated that she will have follow up labs on  tuesday and have the ordering doctor send our office a copy. She stated that decreased her fluid intake.   Patient is aware that Dr. Kenneth Fraga will be out of the office on next week

## 2018-08-12 RX ORDER — FUROSEMIDE 20 MG/1
20 TABLET ORAL DAILY
COMMUNITY
Start: 2018-08-03 | End: 2019-02-27

## 2018-08-16 RX ORDER — PEN NEEDLE, DIABETIC 31 GX3/16"
NEEDLE, DISPOSABLE MISCELLANEOUS
Qty: 200 PEN NEEDLE | Refills: 11 | Status: SHIPPED | OUTPATIENT
Start: 2018-08-16 | End: 2018-10-25 | Stop reason: SDUPTHER

## 2018-08-16 RX ORDER — INSULIN GLARGINE 100 [IU]/ML
30 INJECTION, SOLUTION SUBCUTANEOUS DAILY
Qty: 15 ML | Refills: 1 | COMMUNITY
Start: 2018-08-16 | End: 2018-12-01 | Stop reason: SDUPTHER

## 2018-08-23 ENCOUNTER — OFFICE VISIT (OUTPATIENT)
Dept: INTERNAL MEDICINE CLINIC | Age: 48
End: 2018-08-23

## 2018-08-23 VITALS
DIASTOLIC BLOOD PRESSURE: 78 MMHG | SYSTOLIC BLOOD PRESSURE: 126 MMHG | WEIGHT: 200 LBS | HEIGHT: 65 IN | TEMPERATURE: 98.3 F | OXYGEN SATURATION: 98 % | BODY MASS INDEX: 33.32 KG/M2 | HEART RATE: 103 BPM | RESPIRATION RATE: 18 BRPM

## 2018-08-23 DIAGNOSIS — I10 ESSENTIAL HYPERTENSION: Chronic | ICD-10-CM

## 2018-08-23 DIAGNOSIS — Z00.00 INITIAL MEDICARE ANNUAL WELLNESS VISIT: Primary | ICD-10-CM

## 2018-08-23 DIAGNOSIS — G47.00 INSOMNIA, UNSPECIFIED TYPE: ICD-10-CM

## 2018-08-23 DIAGNOSIS — F32.A ANXIETY AND DEPRESSION: Chronic | ICD-10-CM

## 2018-08-23 DIAGNOSIS — F41.9 ANXIETY AND DEPRESSION: Chronic | ICD-10-CM

## 2018-08-23 DIAGNOSIS — Z72.0 TOBACCO ABUSE: ICD-10-CM

## 2018-08-23 DIAGNOSIS — B35.1 ONYCHOMYCOSIS OF TOENAIL: ICD-10-CM

## 2018-08-23 DIAGNOSIS — E10.21 DIABETIC NEPHROPATHY ASSOCIATED WITH TYPE 1 DIABETES MELLITUS (HCC): Chronic | ICD-10-CM

## 2018-08-23 RX ORDER — ESOMEPRAZOLE MAGNESIUM 40 MG/1
40 CAPSULE, DELAYED RELEASE ORAL DAILY
COMMUNITY
End: 2019-01-25 | Stop reason: SDUPTHER

## 2018-08-23 RX ORDER — GABAPENTIN 600 MG/1
600 TABLET ORAL 3 TIMES DAILY
COMMUNITY
End: 2018-10-08 | Stop reason: SDUPTHER

## 2018-08-23 NOTE — MR AVS SNAPSHOT
[address] [facility] 
[phone] Patient: Cheyanne Johnson MRN: [id number] ZNW:[birth year] Visit Information Date & Time Provider Department Dept. Phone Encounter #  
 8/23/2018  9:30 AM Angela Young, [address] [id number] Follow-up Instructions Return in about 6 months (around 2/23/2019). Your Appointments 10/30/2018  9:30 AM  
Follow Up with MD Tomasz Perezmond [facility] [address]) Appt Note: f/u diabetes ok per Dr. Negin Dang [facility] [address] [phone] [facility] Upcoming Health Maintenance Date Due Influenza Age 5 to Adult 8/1/2018 FOOT EXAM Q1 9/28/2018 Pneumococcal 19-64 Highest Risk (3 of 3 - PCV13) 11/14/2018 HEMOGLOBIN A1C Q6M 1/24/2019 EYE EXAM RETINAL OR DILATED Q1 3/27/2019 MICROALBUMIN Q1 4/24/2019 LIPID PANEL Q1 4/24/2019 MEDICARE YEARLY EXAM 8/24/2019 PAP AKA CERVICAL CYTOLOGY 10/9/2020 DTaP/Tdap/Td series (2 - Td) 5/29/2028 Allergies as of 8/23/2018  Review Complete On: 8/23/2018 By: Arnaldo Sanabria III, DO Severity Noted Reaction Type Reactions Zocor [Simvastatin] Medium 11/20/2009   Intolerance Myalgia Lisinopril  05/26/2011    Cough Current Immunizations  Never Reviewed Name Date Influenza Vaccine (Quad) PF 11/14/2017 Influenza Vaccine Split 10/1/2012 Pneumococcal Polysaccharide (PPSV-23) 11/14/2017 Tdap 5/29/2018 10:19 AM, 12/15/2017 Not reviewed this visit You Were Diagnosed With   
  
 Codes Comments Initial Medicare annual wellness visit    -  Primary ICD-10-CM: Z00.00 ICD-9-CM: V70.0  Uncontrolled type I diabetes mellitus with neuropathy ([facility]     ICD-10-CM: E10.40, E10.65 
 ICD-9-CM: 250.63, 357.2 Essential hypertension     ICD-10-CM: I10 
ICD-9-CM: 401.9 Anxiety and depression     ICD-10-CM: F41.9, F32.9 ICD-9-CM: 300.00, 311 Tobacco abuse     ICD-10-CM: Z72.0 ICD-9-CM: 305.1 Diabetic nephropathy associated with type 1 diabetes mellitus ([facility]     ICD-10-CM: E10.21 ICD-9-CM: 250.41, 583.81 Insomnia, unspecified type     ICD-10-CM: G47.00 ICD-9-CM: 780.52 Onychomycosis of toenail     ICD-10-CM: B35.1 ICD-9-CM: 110.1 Vitals BP Pulse Temp Resp Height(growth percentile) Weight(growth percentile) 126/78 (BP 1 Location: Right arm, BP Patient Position: Sitting) (!) 103 98.3 °F (36.8 °C) (Oral) 18 5' 5\" (1.651 m) 200 lb (90.7 kg) SpO2 BMI OB Status Smoking Status 98% 33.28 kg/m2 Implant Current Some Day Smoker Vitals History BMI and BSA Data Body Mass Index Body Surface Area  
 33.28 kg/m 2 2.04 m 2 Preferred Pharmacy Pharmacy Name Phone [facility] [address] [place] [address], [address] [place] [address] [phone] Your Updated Medication List  
  
   
This list is accurate as of 8/23/18 10:29 AM.  Always use your most recent med list.  
  
  
  
  
 acetaminophen 325 mg tablet Commonly known as:  TYLENOL Take 2 Tabs by mouth every six (6) hours as needed for Fever. albuterol 90 mcg/actuation inhaler Commonly known as:  PROVENTIL HFA, VENTOLIN HFA, PROAIR HFA Take 1 Puff by inhalation every four (4) hours as needed for Wheezing. ARIPiprazole 30 mg tablet Commonly known as:  ABILIFY Take 30 mg by mouth daily. atorvastatin 40 mg tablet Commonly known as:  LIPITOR Take 1 Tab by mouth daily. carvedilol 12.5 mg tablet Commonly known as:  Haskel Scarce Take 12.5 mg by mouth daily. EFFEXOR  mg capsule Generic drug:  venlafaxine-SR Take 150 mg by mouth two (2) times a day. esomeprazole 40 mg capsule Commonly known as:  Vern Baca Take 40 mg by mouth daily. fentaNYL 100 mcg/hr PATCH Commonly known as:  DURAGESIC  
1 Patch by TransDERmal route every seventy-two (72) hours. FREESTYLE MARIELA SENSOR Kit Generic drug:  flash glucose sensor Use 1 sensor every 10 days as directed  
  
 furosemide 20 mg tablet Commonly known as:  LASIX Take 40 mg by mouth two (2) times a day. Take 1 tab daily * gabapentin 600 mg tablet Commonly known as:  NEURONTIN Take 600 mg by mouth three (3) times daily. * gabapentin 300 mg capsule Commonly known as:  NEURONTIN Take 2 Caps by mouth three (3) times daily. glucagon 1 mg injection Commonly known as:  GLUCAGON EMERGENCY KIT (HUMAN) Use as directed HumaLOG KwikPen Insulin 100 unit/mL kwikpen Generic drug:  insulin lispro 1:15 for carbs and 1:30 > 150 for correction during the day and > 180 at bedtime. Max 50 units per day Insulin Needles (Disposable) 31 gauge x 3/16\" Ndle Commonly known as:  BD ULTRA-FINE MINI PEN NEEDLE Use as directed up to 8 times daily LANTUS SOLOSTAR U-100 INSULIN 100 unit/mL (3 mL) Inpn Generic drug:  insulin glargine 32 Units by SubCUTAneous route daily. Dose change 8/16/18--updated med list--did not send prescription to the pharmacy LORazepam 1 mg tablet Commonly known as:  ATIVAN Take 1 mg by mouth three (3) times daily. magnesium oxide 400 mg tablet Commonly known as:  MAG-OX Take 800 mg by mouth two (2) times a day. MIRENA 20 mcg/24 hr (5 years) Iud  
Generic drug:  levonorgestrel 1 Device by IntraUTERine route once. NORCO  mg tablet Generic drug:  HYDROcodone-acetaminophen Take 1 Tab by mouth every eight (8) hours as needed. One Touch Delica 33 gauge Misc Generic drug:  lancets Test 8 times daily--DX E10.65  
  
 potassium chloride 20 mEq tablet Commonly known as:  K-DUR, KLOR-CON Take 20 mEq by mouth two (2) times a day. REGLAN 5 mg tablet Generic drug:  metoclopramide HCl Take 5 mg by mouth Before breakfast, lunch, and dinner. * Notice: This list has 2 medication(s) that are the same as other medications prescribed for you. Read the directions carefully, and ask your doctor or other care provider to review them with you. We Performed the Following CBC WITH AUTOMATED DIFF [17865 CPT(R)] HEMOGLOBIN A1C WITH EAG [72021 CPT(R)] LIPID PANEL [39532 CPT(R)] METABOLIC PANEL, COMPREHENSIVE [40095 CPT(R)] Follow-up Instructions Return in about 6 months (around 2/23/2019). Patient Instructions Medicare Wellness Visit, Female The best way to live healthy is to have a lifestyle where you eat a well-balanced diet, exercise regularly, limit alcohol use, and quit all forms of tobacco/nicotine, if applicable. Regular preventive services are another way to keep healthy. Preventive services (vaccines, screening tests, monitoring & exams) can help personalize your care plan, which helps you manage your own care. Screening tests can find health problems at the earliest stages, when they are easiest to treat. [facility] follows the current, evidence-based guidelines published by the [employer] [place] (USPSTF) when recommending preventive services for our patients. Because we follow these guidelines, sometimes recommendations change over time as research supports it. (For example, mammograms used to be recommended annually. Even though Medicare will still pay for an annual mammogram, the newer guidelines recommend a mammogram every two years for women of average risk.) Of course, you and your doctor may decide to screen more often for some diseases, based on your risk and your health status. Preventive services for you include: - Medicare offers their members a free annual wellness visit, which is time for you and your primary care provider to discuss and plan for your preventive service needs. Take advantage of this benefit every year! 
-All adults over the age of 72 should receive the recommended pneumonia vaccines. Current USPSTF guidelines recommend a series of two vaccines for the best pneumonia protection.  
-All adults should have a flu vaccine yearly and a tetanus vaccine every 10 years. All adults age 61 and older should receive a shingles vaccine once in their lifetime.   
-A bone mass density test is recommended when a woman turns 65 to screen for osteoporosis. This test is only recommended one time, as a screening. Some providers will use this same test as a disease monitoring tool if you already have osteoporosis. -All adults age 38-68 who are overweight should have a diabetes screening test once every three years.  
-Other screening tests and preventive services for persons with diabetes include: an eye exam to screen for diabetic retinopathy, a kidney function test, a foot exam, and stricter control over your cholesterol.  
-Cardiovascular screening for adults with routine risk involves an electrocardiogram (ECG) at intervals determined by your doctor.  
-Colorectal cancer screenings should be done for adults age 54-65 with no increased risk factors for colorectal cancer. There are a number of acceptable methods of screening for this type of cancer. Each test has its own benefits and drawbacks. Discuss with your doctor what is most appropriate for you during your annual wellness visit. The different tests include: colonoscopy (considered the best screening method), a fecal occult blood test, a fecal DNA test, and sigmoidoscopy.  
-Breast cancer screenings are recommended every other year for women of normal risk, age 54-69. 
-Cervical cancer screenings for women over age 72 are only recommended with certain risk factors.  
-All adults born between [facility] should be screened once for Hepatitis C.  
 
 Here is a list of your current Health Maintenance items (your personalized list of preventive services) with a due date: 
Health Maintenance Due Topic Date Due  
 Annual Well Visit  04/12/2018  Flu Vaccine  08/01/2018 Insomnia: Care Instructions Your Care Instructions Insomnia is the inability to sleep well. It is a common problem for most people at some time. Insomnia may make it hard for you to get to sleep, stay asleep, or sleep as long as you need to. This can make you tired and grouchy during the day. It can also make you forgetful, less effective at work, and unhappy. Insomnia can be caused by conditions such as depression or anxiety. Pain can also affect your ability to sleep. When these problems are solved, the insomnia usually clears up. But sometimes bad sleep habits can cause insomnia. If insomnia is affecting your work or your enjoyment of life, you can take steps to improve your sleep. Follow-up care is a key part of your treatment and safety. Be sure to make and go to all appointments, and call your doctor if you are having problems. It's also a good idea to know your test results and keep a list of the medicines you take. How can you care for yourself at home? What to avoid · Do not have drinks with caffeine, such as coffee or black tea, for 8 hours before bed. · Do not smoke or use other types of tobacco near bedtime. Nicotine is a stimulant and can keep you awake. · Avoid drinking alcohol late in the evening, because it can cause you to wake in the middle of the night. · Do not eat a big meal close to bedtime. If you are hungry, eat a light snack. · Do not drink a lot of water close to bedtime, because the need to urinate may wake you up during the night. · Do not read or watch TV in bed. Use the bed only for sleeping and sexual activity. What to try · Go to bed at the same time every night, and wake up at the same time every morning. Do not take naps during the day. · Keep your bedroom quiet, dark, and cool. · Sleep on a comfortable pillow and mattress. · If watching the clock makes you anxious, turn it facing away from you so you cannot see the time. · If you worry when you lie down, start a worry book. Well before bedtime, write down your worries, and then set the book and your concerns aside. · Try meditation or other relaxation techniques before you go to bed. · If you cannot fall asleep, get up and go to another room until you feel sleepy. Do something relaxing. Repeat your bedtime routine before you go to bed again. · Make your house quiet and calm about an hour before bedtime. Turn down the lights, turn off the TV, log off the computer, and turn down the volume on music. This can help you relax after a busy day. When should you call for help? Watch closely for changes in your health, and be sure to contact your doctor if: 
  · Your efforts to improve your sleep do not work.  
  · Your insomnia gets worse.  
  · You have been feeling down, depressed, or hopeless or have lost interest in things that you usually enjoy. Where can you learn more? Go to http://mac-rufus.info/. Enter P513 in the search box to learn more about \"Insomnia: Care Instructions. \" Current as of: October 10, 2017 Content Version: 11.7 © 3056-4011 [employer]. Care instructions adapted under license by Perg[employer] (which disclaims liability or warranty for this information). If you have questions about a medical condition or this instruction, always ask your healthcare professional. [address] any warranty or liability for your use of this information. Learning About Sleeping Well What does sleeping well mean? Sleeping well means getting enough sleep. How much sleep is enough varies among people. The number of hours you sleep is not as important as how you feel when you wake up. If you do not feel refreshed, you probably need more sleep. Another sign of not getting enough sleep is feeling tired during the day. The average total nightly sleep time is 7½ to 8 hours. Healthy adults may need a little more or a little less than this. Why is getting enough sleep important? Getting enough quality sleep is a basic part of good health. When your sleep suffers, your mood and your thoughts can suffer too. You may find yourself feeling more grumpy or stressed. Not getting enough sleep also can lead to serious problems, including injury, accidents, anxiety, and depression. What might cause poor sleeping? Many things can cause sleep problems, including: · Stress. Stress can be caused by fear about a single event, such as giving a speech. Or you may have ongoing stress, such as worry about work or school. · Depression, anxiety, and other mental or emotional conditions. · Changes in your sleep habits or surroundings. This includes changes that happen where you sleep, such as noise, light, or sleeping in a different bed. It also includes changes in your sleep pattern, such as having jet lag or working a late shift. · Health problems, such as pain, breathing problems, and restless legs syndrome. · Lack of regular exercise. How can you help yourself? Here are some tips that may help you sleep more soundly and wake up feeling more refreshed. Your sleeping area · Use your bedroom only for sleeping and sex. A bit of light reading may help you fall asleep. But if it doesn't, do your reading elsewhere in the house. Don't watch TV in bed. · Be sure your bed is big enough to stretch out comfortably, especially if you have a sleep partner. · Keep your bedroom quiet, dark, and cool. Use curtains, blinds, or a sleep mask to block out light. To block out noise, use earplugs, soothing music, or a \"white noise\" machine. Your evening and bedtime routine · Create a relaxing bedtime routine. You might want to take a warm shower or bath, listen to soothing music, or drink a cup of noncaffeinated tea. · Go to bed at the same time every night. And get up at the same time every morning, even if you feel tired. What to avoid · Limit caffeine (coffee, tea, caffeinated sodas) during the day, and don't have any for at least 4 to 6 hours before bedtime. · Don't drink alcohol before bedtime. Alcohol can cause you to wake up more often during the night. · Don't smoke or use tobacco, especially in the evening. Nicotine can keep you awake. · Don't take naps during the day, especially close to bedtime. · Don't lie in bed awake for too long. If you can't fall asleep, or if you wake up in the middle of the night and can't get back to sleep within 15 minutes or so, get out of bed and go to another room until you feel sleepy. · Don't take medicine right before bed that may keep you awake or make you feel hyper or energized. Your doctor can tell you if your medicine may do this and if you can take it earlier in the day. If you can't sleep · Imagine yourself in a peaceful, pleasant scene. Focus on the details and feelings of being in a place that is relaxing. · Get up and do a quiet or boring activity until you feel sleepy. · Don't drink any liquids after 6 p.m. if you wake up often because you have to go to the bathroom. Where can you learn more? Go to http://mac-rufus.info/. Enter U260 in the search box to learn more about \"Learning About Sleeping Well. \" Current as of: December 7, 2017 Content Version: 11.7 © 8953-3083 [employer], Re[employer]. Care instructions adapted under license by Hi[employer] (which disclaims liability or warranty for this information).  If you have questions about a medical condition or this instruction, always ask your healthcare professional. Alondra Alfaro, Incorporated disclaims any warranty or liability for your use of this information. Try melatonin 5 mg at bedtime to help with insomnia. If your liver tests look ok, will send in rx for lamisil. Introducing [facility] SERVICES! Dear Rebeca Poe: 
Thank you for requesting a [employer] account. Our records indicate that you already have an active [employer] account. You can access your account anytime at [email or web address] Did you know that you can access your hospital and ER discharge instructions at any time in [employer]? You can also review all of your test results from your hospital stay or ER visit. Additional Information If you have questions, please visit the Frequently Asked Questions section of the [employer] website at [email or web address] Remember, [employer] is NOT to be used for urgent needs. For medical emergencies, dial 911. Now available from your iPhone and Android! Please provide this summary of care documentation to your next provider. Your primary care clinician is listed as Paty Mercado If you have any questions after today's visit, please call [phone].

## 2018-08-23 NOTE — PROGRESS NOTES
This is an Initial Medicare Annual Wellness Exam (AWV) (Performed 12 months after IPPE or effective date of Medicare Part B enrollment, Once in a lifetime)    I have reviewed the patient's medical history in detail and updated the computerized patient record. History     Past Medical History:   Diagnosis Date    Achilles tendon rupture     along with torn right patellar/femoral ligament    Arthritis     rt. foot    Chronic pain     abdominal pain    Diabetes (HCC)     IDDM on insulin pump and sensor    DM type 1 (diabetes mellitus, type 1) ([facility]     age 25    Endometriosis     s/p ex-lap 4x    Gastric ulcer     GERD (gastroesophageal reflux disease)     Herpes     Other and unspecified hyperlipidemia     Panic attacks     Psychiatric disorder     anxiety, panic attacks    PTSD (post-traumatic stress disorder)     Unspecified essential hypertension       Past Surgical History:   Procedure Laterality Date    HX GYN      2 d&c's    HX GYN      laparoscopies x3    HX ORTHOPAEDIC  2002    achiles tendon repair,talus repair    HX ORTHOPAEDIC      injections x2 to right shoulder     Current Outpatient Prescriptions   Medication Sig Dispense Refill    esomeprazole (NEXIUM) 40 mg capsule Take 40 mg by mouth daily.  gabapentin (NEURONTIN) 600 mg tablet Take 600 mg by mouth three (3) times daily.  levonorgestrel (MIRENA) 20 mcg/24 hr (5 years) IUD 1 Device by IntraUTERine route once.  Insulin Needles, Disposable, (BD ULTRA-FINE MINI PEN NEEDLE) 31 gauge x 3/16\" ndle Use as directed up to 8 times daily 200 Pen Needle 11    insulin glargine (LANTUS SOLOSTAR U-100 INSULIN) 100 unit/mL (3 mL) inpn 32 Units by SubCUTAneous route daily. Dose change 8/16/18--updated med list--did not send prescription to the pharmacy 15 mL 1    furosemide (LASIX) 20 mg tablet Take 40 mg by mouth two (2) times a day.  Take 1 tab daily      metoclopramide HCl (REGLAN) 5 mg tablet Take 5 mg by mouth Before breakfast, lunch, and dinner.  ARIPiprazole (ABILIFY) 30 mg tablet Take 30 mg by mouth daily.  LORazepam (ATIVAN) 1 mg tablet Take 1 mg by mouth three (3) times daily.  fentaNYL (DURAGESIC) 100 mcg/hr PATCH 1 Patch by TransDERmal route every seventy-two (72) hours.  carvedilol (COREG) 12.5 mg tablet Take 12.5 mg by mouth daily.  FREESTYLE MARIELA SENSOR kit Use 1 sensor every 10 days as directed 3 Each 11    albuterol (PROVENTIL HFA, VENTOLIN HFA, PROAIR HFA) 90 mcg/actuation inhaler Take 1 Puff by inhalation every four (4) hours as needed for Wheezing. 1 Inhaler 1    HYDROcodone-acetaminophen (NORCO)  mg tablet Take 1 Tab by mouth every eight (8) hours as needed.  acetaminophen (TYLENOL) 325 mg tablet Take 2 Tabs by mouth every six (6) hours as needed for Fever. 30 Tab 0    potassium chloride (K-DUR, KLOR-CON) 20 mEq tablet Take 20 mEq by mouth two (2) times a day.  magnesium oxide (MAG-OX) 400 mg tablet Take 800 mg by mouth two (2) times a day.  ONE TOUCH DELICA 33 gauge misc Test 8 times daily--DX E10.65 250 Lancet 11    atorvastatin (LIPITOR) 40 mg tablet Take 1 Tab by mouth daily. 30 Tab 11    venlafaxine-SR (EFFEXOR XR) 150 mg capsule Take 150 mg by mouth two (2) times a day.  insulin lispro (HUMALOG KWIKPEN) 100 unit/mL kwikpen 1:15 for carbs and 1:30 > 150 for correction during the day and > 180 at bedtime. Max 50 units per day 1 Package     gabapentin (NEURONTIN) 300 mg capsule Take 2 Caps by mouth three (3) times daily.       glucagon (GLUCAGON EMERGENCY KIT, HUMAN,) 1 mg injection Use as directed 2 Kit 11     Allergies   Allergen Reactions    Zocor [Simvastatin] Myalgia    Lisinopril Cough     Family History   Problem Relation Age of Onset    Diabetes Mother      diet controlled    Diabetes Father      R foot amupation    Liver Disease Father     Heart Disease Paternal Uncle      MI in his 46s    Stroke Neg Hx      Social History   Substance Use Topics    Smoking status: Current Some Day Smoker     Packs/day: 1.50     Years: 17.00     Types: Cigarettes    Smokeless tobacco: Current User      Comment: Trying to quit    Alcohol use No      Comment: a beer every few months     Patient Active Problem List   Diagnosis Code    Hyperlipidemia LDL goal <100 E78.5    Uncontrolled type I diabetes mellitus with neuropathy ([facility] E10.40, E10.65    Endometriosis N80.9    Abnormal weight gain R63.5    Elevated LFTs R79.89    Positive blood culture R78.81    Tobacco abuse Z72.0    Anxiety and depression F41.9, F32.9    D[facility]diabetic k[facility]acidoses) ([facility] E13.10    Aspiration pneumonia ([facility] J69.0    Essential hypertension I10    Diabetic nephropathy associated with type 1 diabetes mellitus ([facility] E10.21       Depression Risk Factor Screening:     PHQ over the last two weeks 11/14/2017   PHQ Not Done Active Diagnosis of Depression or Bipolar Disorder     Alcohol Risk Factor Screening: You do not drink alcohol or very rarely. Functional Ability and Level of Safety:     Hearing Loss  Hearing is good. Activities of Daily Living  The home contains: grab bars  Patient does total self care    Fall Risk  No flowsheet data found. Abuse Screen  Patient is not abused.   Lives with boyfriend of 18 months    Cognitive Screening   Evaluation of Cognitive Function:  Has your family/caregiver stated any concerns about your memory: no  Normal    Patient Care Team   Patient Care Team:  Madelyn Sandoval DO as PCP - General (Internal Medicine)  Edson De Luna MD as Consulting Provider (Endocrinology)  Brian Álvarez MD (Obstetrics & Gynecology)  Woody Ruggiero MD (Sports Medicine)  Favian Loco MD (Nephrology)  Poncho Marques MD (Gastroenterology)    Assessment/Plan   Education and counseling provided:  Are appropriate based on today's review and evaluation  End-of-Life planning (with patient's consent)  Screening Mammography  Screening Pap and pelvic (covered once every 2 years)  Colorectal cancer screening tests    Diagnoses and all orders for this visit:    1.  Initial Medicare annual wellness visit       Health Maintenance Due   Topic Date Due    MEDICARE YEARLY EXAM  04/12/2018    Influenza Age 5 to Adult  08/01/2018

## 2018-08-23 NOTE — PROGRESS NOTES
Rudi Orozco is a 53 y.o. female who presents for evaluation of routine follow up. Last seen by me may 11, 2018. Since then, went to [facility] [place] ed on July 30 with ams, had anish, and hyponatremia. Was d/c to home, but was admitted to j/w by her nephrologist, dr Katia Sutton. Her zaroxolyn was d/c, and she has been doing well since then. Complaints today though of insomnia as well as toenail fungus on both great toes.       ROS:  Constitutional: negative for fevers, chills, anorexia and weight loss  Eyes:   negative for visual disturbance and irritation  ENT:   negative for tinnitus,sore throat,nasal congestion,ear pain,hoarseness  Respiratory:  negative for cough, hemoptysis, dyspnea,wheezing  CV:   negative for chest pain, palpitations, lower extremity edema  GI:   negative for nausea, vomiting, diarrhea, abdominal pain,melena  Genitourinary: negative for frequency, dysuria and hematuria  Musculoskel: negative for myalgias, arthralgias, back pain, muscle weakness, joint pain  Neurological:  negative for headaches, dizziness, focal weakness, numbness  Psychiatric:     Negative for depression or anxiety      Past Medical History:   Diagnosis Date    Achilles tendon rupture     along with torn right patellar/femoral ligament    Arthritis     rt. foot    Chronic pain     abdominal pain    Diabetes (HCC)     IDDM on insulin pump and sensor    DM type 1 (diabetes mellitus, type 1) ([facility] [address]     age 25    Endometriosis     s/p ex-lap 4x    Gastric ulcer     GERD (gastroesophageal reflux disease)     Herpes     Other and unspecified hyperlipidemia     Panic attacks     Psychiatric disorder     anxiety, panic attacks    PTSD (post-traumatic stress disorder)     Unspecified essential hypertension        Past Surgical History:   Procedure Laterality Date    HX GYN      2 d&c's    HX GYN      laparoscopies x3    HX ORTHOPAEDIC  2002    achiles tendon repair,talus repair    HX ORTHOPAEDIC      injections x2 to right shoulder       Family History   Problem Relation Age of Onset    Diabetes Mother      diet controlled    Diabetes Father      R foot amupation    Liver Disease Father     Heart Disease Paternal Uncle      MI in his 46s    Stroke Neg Hx        Social History     Social History    Marital status: SINGLE     Spouse name: N/A    Number of children: N/A    Years of education: N/A     Occupational History    Not on file. Social History Main Topics    Smoking status: Current Some Day Smoker     Packs/day: 1.50     Years: 17.00     Types: Cigarettes    Smokeless tobacco: Current User      Comment: Trying to quit    Alcohol use No      Comment: a beer every few months    Drug use: No    Sexual activity: Yes     Partners: Male     Other Topics Concern    Not on file     Social History Narrative            Visit Vitals    /78 (BP 1 Location: Right arm, BP Patient Position: Sitting)    Pulse (!) 103    Temp 98.3 °F (36.8 °C) (Oral)    Resp 18    Ht 5' 5\" (1.651 m)    Wt 200 lb (90.7 kg)    SpO2 98%    BMI 33.28 kg/m2       Physical Examination:   General - Well appearing female  HEENT - PERRL, TM no erythema/opacification, normal nasal turbinates, no oropharyngeal erythema or exudate, MMM  Neck - supple, no bruits, no thyroidomegaly, no lymphadenopathy  Pulm - clear to auscultation bilaterally  Cardio - RRR, normal S1 S2, no murmur  Abd - soft, nontender, no masses, no HSM  Extrem - no edema, +2 distal pulses  Neuro-  No focal deficits, CN intact     Assessment/Plan:    1. Insomnia--info given on sleep hygiene, suggested melatonin  2. Toenail onychomycosis--if lfts ok, will send in rx for lamisil  3. Anxiety and depression--doing well with abilify, ativan, effexor  4. Chronic pain syndrome--on fentanyl patches  5. Tobacco abuse--encouraged to continue to work on cutting back  6. Uncontrolled dm, type 1--on lantus, check a1c.   Follows with dr Shy Angeles  7.  hyperlipids--check flp, on lipitor  8.  htn--controlled with coreg  9.   Routine adult health maintenance--had mamm with dr Jennifer Dorantes    rtc 6 months        Aviva Ibarra III, DO

## 2018-08-23 NOTE — PROGRESS NOTES
Reviewed record in preparation for visit and have obtained necessary documentation. Identified pt with two pt identifiers(name and ). Chief Complaint   Patient presents with   [facility] Wellness Visit       Health Maintenance Due   Topic Date Due    MEDICARE YEARLY EXAM  2018    Influenza Age 5 to Adult  2018       Ms. Shikha Quigley has a reminder for a \"due or due soon\" health maintenance. I have asked that she discuss health maintenance topic(s) due with Her  primary care provider. Coordination of Care Questionnaire:  :     1) Have you been to an emergency room, urgent care clinic since your last visit? yes  Hospitalized since your last visit? no             2) Have you seen or consulted any other health care providers outside of [address] [place] [address] since your last visit? yes  (Include any pap smears or colon screenings in this section.)    3) Do you have an Advance Directive on file? no    4) Are you interested in receiving information on Advance Directives? NO    Patient is accompanied by self I have received verbal consent from Cecil Mccullough to discuss any/all medical information while they are present in the room.

## 2018-08-23 NOTE — PATIENT INSTRUCTIONS
Medicare Wellness Visit, Female     The best way to live healthy is to have a lifestyle where you eat a well-balanced diet, exercise regularly, limit alcohol use, and quit all forms of tobacco/nicotine, if applicable. Regular preventive services are another way to keep healthy. Preventive services (vaccines, screening tests, monitoring & exams) can help personalize your care plan, which helps you manage your own care. Screening tests can find health problems at the earliest stages, when they are easiest to treat. [facility] follows the current, evidence-based guidelines published by the [facility] ([facility]) when recommending preventive services for our patients. Because we follow these guidelines, sometimes recommendations change over time as research supports it. (For example, mammograms used to be recommended annually. Even though Medicare will still pay for an annual mammogram, the newer guidelines recommend a mammogram every two years for women of average risk.)  Of course, you and your doctor may decide to screen more often for some diseases, based on your risk and your health status. Preventive services for you include:  - Medicare offers their members a free annual wellness visit, which is time for you and your primary care provider to discuss and plan for your preventive service needs. Take advantage of this benefit every year!  -All adults over the age of 72 should receive the recommended pneumonia vaccines. Current USPSTF guidelines recommend a series of two vaccines for the best pneumonia protection.   -All adults should have a flu vaccine yearly and a tetanus vaccine every 10 years. All adults age 61 and older should receive a shingles vaccine once in their lifetime.    -A bone mass density test is recommended when a woman turns 65 to screen for osteoporosis. This test is only recommended one time, as a screening.  Some providers will use this same test as a disease monitoring tool if you already have osteoporosis. -All adults age 38-68 who are overweight should have a diabetes screening test once every three years.   -Other screening tests and preventive services for persons with diabetes include: an eye exam to screen for diabetic retinopathy, a kidney function test, a foot exam, and stricter control over your cholesterol.   -Cardiovascular screening for adults with routine risk involves an electrocardiogram (ECG) at intervals determined by your doctor.   -Colorectal cancer screenings should be done for adults age 54-65 with no increased risk factors for colorectal cancer. There are a number of acceptable methods of screening for this type of cancer. Each test has its own benefits and drawbacks. Discuss with your doctor what is most appropriate for you during your annual wellness visit. The different tests include: colonoscopy (considered the best screening method), a fecal occult blood test, a fecal DNA test, and sigmoidoscopy. -Breast cancer screenings are recommended every other year for women of normal risk, age 54-69.  -Cervical cancer screenings for women over age 72 are only recommended with certain risk factors.   -All adults born between [facility] should be screened once for Hepatitis C. Here is a list of your current Health Maintenance items (your personalized list of preventive services) with a due date:  Health Maintenance Due   Topic Date Due    Annual Well Visit  04/12/2018    Flu Vaccine  08/01/2018            Insomnia: Care Instructions  Your Care Instructions    Insomnia is the inability to sleep well. It is a common problem for most people at some time. Insomnia may make it hard for you to get to sleep, stay asleep, or sleep as long as you need to. This can make you tired and grouchy during the day. It can also make you forgetful, less effective at work, and unhappy. Insomnia can be caused by conditions such as depression or anxiety. Pain can also affect your ability to sleep. When these problems are solved, the insomnia usually clears up. But sometimes bad sleep habits can cause insomnia. If insomnia is affecting your work or your enjoyment of life, you can take steps to improve your sleep. Follow-up care is a key part of your treatment and safety. Be sure to make and go to all appointments, and call your doctor if you are having problems. It's also a good idea to know your test results and keep a list of the medicines you take. How can you care for yourself at home? What to avoid  · Do not have drinks with caffeine, such as coffee or black tea, for 8 hours before bed. · Do not smoke or use other types of tobacco near bedtime. Nicotine is a stimulant and can keep you awake. · Avoid drinking alcohol late in the evening, because it can cause you to wake in the middle of the night. · Do not eat a big meal close to bedtime. If you are hungry, eat a light snack. · Do not drink a lot of water close to bedtime, because the need to urinate may wake you up during the night. · Do not read or watch TV in bed. Use the bed only for sleeping and sexual activity. What to try  · Go to bed at the same time every night, and wake up at the same time every morning. Do not take naps during the day. · Keep your bedroom quiet, dark, and cool. · Sleep on a comfortable pillow and mattress. · If watching the clock makes you anxious, turn it facing away from you so you cannot see the time. · If you worry when you lie down, start a worry book. Well before bedtime, write down your worries, and then set the book and your concerns aside. · Try meditation or other relaxation techniques before you go to bed. · If you cannot fall asleep, get up and go to another room until you feel sleepy. Do something relaxing. Repeat your bedtime routine before you go to bed again. · Make your house quiet and calm about an hour before bedtime.  Turn down the lights, turn off the TV, log off the computer, and turn down the volume on music. This can help you relax after a busy day. When should you call for help? Watch closely for changes in your health, and be sure to contact your doctor if:    · Your efforts to improve your sleep do not work.     · Your insomnia gets worse.     · You have been feeling down, depressed, or hopeless or have lost interest in things that you usually enjoy. Where can you learn more? Go to http://mac-rufus.info/. Enter P513 in the search box to learn more about \"Insomnia: Care Instructions. \"  Current as of: October 10, 2017  Content Version: 11.7  © 7030-1043 [employer]. Care instructions adapted under license by [employer] (which disclaims liability or warranty for this information). If you have questions about a medical condition or this instruction, always ask your healthcare professional. [place] [address] any warranty or liability for your use of this information. Learning About Sleeping Well  What does sleeping well mean? Sleeping well means getting enough sleep. How much sleep is enough varies among people. The number of hours you sleep is not as important as how you feel when you wake up. If you do not feel refreshed, you probably need more sleep. Another sign of not getting enough sleep is feeling tired during the day. The average total nightly sleep time is 7½ to 8 hours. Healthy adults may need a little more or a little less than this. Why is getting enough sleep important? Getting enough quality sleep is a basic part of good health. When your sleep suffers, your mood and your thoughts can suffer too. You may find yourself feeling more grumpy or stressed. Not getting enough sleep also can lead to serious problems, including injury, accidents, anxiety, and depression. What might cause poor sleeping? Many things can cause sleep problems, including:  · Stress.  Stress can be caused by fear about a single event, such as giving a speech. Or you may have ongoing stress, such as worry about work or school. · Depression, anxiety, and other mental or emotional conditions. · Changes in your sleep habits or surroundings. This includes changes that happen where you sleep, such as noise, light, or sleeping in a different bed. It also includes changes in your sleep pattern, such as having jet lag or working a late shift. · Health problems, such as pain, breathing problems, and restless legs syndrome. · Lack of regular exercise. How can you help yourself? Here are some tips that may help you sleep more soundly and wake up feeling more refreshed. Your sleeping area  · Use your bedroom only for sleeping and sex. A bit of light reading may help you fall asleep. But if it doesn't, do your reading elsewhere in the house. Don't watch TV in bed. · Be sure your bed is big enough to stretch out comfortably, especially if you have a sleep partner. · Keep your bedroom quiet, dark, and cool. Use curtains, blinds, or a sleep mask to block out light. To block out noise, use earplugs, soothing music, or a \"white noise\" machine. Your evening and bedtime routine  · Create a relaxing bedtime routine. You might want to take a warm shower or bath, listen to soothing music, or drink a cup of noncaffeinated tea. · Go to bed at the same time every night. And get up at the same time every morning, even if you feel tired. What to avoid  · Limit caffeine (coffee, tea, caffeinated sodas) during the day, and don't have any for at least 4 to 6 hours before bedtime. · Don't drink alcohol before bedtime. Alcohol can cause you to wake up more often during the night. · Don't smoke or use tobacco, especially in the evening. Nicotine can keep you awake. · Don't take naps during the day, especially close to bedtime. · Don't lie in bed awake for too long.  If you can't fall asleep, or if you wake up in the middle of the night and can't get back to sleep within 15 minutes or so, get out of bed and go to another room until you feel sleepy. · Don't take medicine right before bed that may keep you awake or make you feel hyper or energized. Your doctor can tell you if your medicine may do this and if you can take it earlier in the day. If you can't sleep  · Imagine yourself in a peaceful, pleasant scene. Focus on the details and feelings of being in a place that is relaxing. · Get up and do a quiet or boring activity until you feel sleepy. · Don't drink any liquids after 6 p.m. if you wake up often because you have to go to the bathroom. Where can you learn more? Go to http://mac-rufus.info/. Enter H642 in the search box to learn more about \"Learning About Sleeping Well. \"  Current as of: December 7, 2017  Content Version: 11.7  © 8661-8848 [employer]. Care instructions adapted under license by [employer] (which disclaims liability or warranty for this information). If you have questions about a medical condition or this instruction, always ask your healthcare professional. [address] any warranty or liability for your use of this information. Try melatonin 5 mg at bedtime to help with insomnia. If your liver tests look ok, will send in rx for lamisil.

## 2018-08-24 LAB
ALBUMIN SERPL-MCNC: 4.6 G/DL (ref 3.5–5.5)
ALBUMIN/GLOB SERPL: 1.8 {RATIO} (ref 1.2–2.2)
ALP SERPL-CCNC: 96 IU/L (ref 39–117)
ALT SERPL-CCNC: 15 IU/L (ref 0–32)
AST SERPL-CCNC: 19 IU/L (ref 0–40)
BASOPHILS # BLD AUTO: 0 X10E3/UL (ref 0–0.2)
BASOPHILS NFR BLD AUTO: 0 %
BILIRUB SERPL-MCNC: 0.3 MG/DL (ref 0–1.2)
BUN SERPL-MCNC: 15 MG/DL (ref 6–24)
BUN/CREAT SERPL: 13 (ref 9–23)
CALCIUM SERPL-MCNC: 9.7 MG/DL (ref 8.7–10.2)
CHLORIDE SERPL-SCNC: 88 MMOL/L (ref 96–106)
CHOLEST SERPL-MCNC: 166 MG/DL (ref 100–199)
CO2 SERPL-SCNC: 25 MMOL/L (ref 20–29)
CREAT SERPL-MCNC: 1.12 MG/DL (ref 0.57–1)
EOSINOPHIL # BLD AUTO: 0 X10E3/UL (ref 0–0.4)
EOSINOPHIL NFR BLD AUTO: 0 %
ERYTHROCYTE [DISTWIDTH] IN BLOOD BY AUTOMATED COUNT: 18.2 % (ref 12.3–15.4)
EST. AVERAGE GLUCOSE BLD GHB EST-MCNC: 217 MG/DL
GLOBULIN SER CALC-MCNC: 2.5 G/DL (ref 1.5–4.5)
GLUCOSE SERPL-MCNC: 403 MG/DL (ref 65–99)
HBA1C MFR BLD: 9.2 % (ref 4.8–5.6)
HCT VFR BLD AUTO: 39 % (ref 34–46.6)
HDLC SERPL-MCNC: 45 MG/DL
HGB BLD-MCNC: 12.8 G/DL (ref 11.1–15.9)
IMM GRANULOCYTES # BLD: 0 X10E3/UL (ref 0–0.1)
IMM GRANULOCYTES NFR BLD: 0 %
LDLC SERPL CALC-MCNC: 94 MG/DL (ref 0–99)
LYMPHOCYTES # BLD AUTO: 2.5 X10E3/UL (ref 0.7–3.1)
LYMPHOCYTES NFR BLD AUTO: 30 %
MCH RBC QN AUTO: 29.3 PG (ref 26.6–33)
MCHC RBC AUTO-ENTMCNC: 32.8 G/DL (ref 31.5–35.7)
MCV RBC AUTO: 89 FL (ref 79–97)
MONOCYTES # BLD AUTO: 0.6 X10E3/UL (ref 0.1–0.9)
MONOCYTES NFR BLD AUTO: 7 %
NEUTROPHILS # BLD AUTO: 5.4 X10E3/UL (ref 1.4–7)
NEUTROPHILS NFR BLD AUTO: 63 %
PLATELET # BLD AUTO: 383 X10E3/UL (ref 150–379)
POTASSIUM SERPL-SCNC: 5.6 MMOL/L (ref 3.5–5.2)
PROT SERPL-MCNC: 7.1 G/DL (ref 6–8.5)
RBC # BLD AUTO: 4.37 X10E6/UL (ref 3.77–5.28)
SODIUM SERPL-SCNC: 131 MMOL/L (ref 134–144)
TRIGL SERPL-MCNC: 137 MG/DL (ref 0–149)
VLDLC SERPL CALC-MCNC: 27 MG/DL (ref 5–40)
WBC # BLD AUTO: 8.6 X10E3/UL (ref 3.4–10.8)

## 2018-08-24 NOTE — PROGRESS NOTES
Kidney function much improved, but potassium levels are high now. Would stop taking the extra potassium pills. Diabetes remains poorly controlled, a1c 9. 2.

## 2018-08-24 NOTE — PROGRESS NOTES
I have attempted to contact this patient by phone with the following results:  Kidney function much improved, but potassium levels are high now. I recommend stop taking the extra potassium pills. Diabetes remains poorly controlled, a1c 9.2. Results mailed to patient's home.

## 2018-08-29 ENCOUNTER — PATIENT MESSAGE (OUTPATIENT)
Dept: INTERNAL MEDICINE CLINIC | Age: 48
End: 2018-08-29

## 2018-08-29 RX ORDER — NYSTATIN 100000 [USP'U]/ML
1 SUSPENSION ORAL 4 TIMES DAILY
Qty: 1500 ML | Refills: 1 | Status: ON HOLD | OUTPATIENT
Start: 2018-08-29 | End: 2019-07-11

## 2018-08-29 NOTE — TELEPHONE ENCOUNTER
From: Kee Dominguez  To: Fred Meadows III DO  Sent: 8/29/2018 6:01 AM EDT  Subject: Prescription Question    Morning   Can you please call in the Nystatin mouth wash to [facility] on [address].      Thanks  Mago

## 2018-09-26 RX ORDER — ATORVASTATIN CALCIUM 40 MG/1
40 TABLET, FILM COATED ORAL DAILY
Qty: 90 TAB | Refills: 3 | Status: SHIPPED | OUTPATIENT
Start: 2018-09-26 | End: 2019-11-13 | Stop reason: SDUPTHER

## 2018-10-08 RX ORDER — GABAPENTIN 300 MG/1
CAPSULE ORAL
Qty: 180 CAP | Refills: 11 | Status: SHIPPED | OUTPATIENT
Start: 2018-10-08 | End: 2019-10-13 | Stop reason: SDUPTHER

## 2018-10-25 RX ORDER — PEN NEEDLE, DIABETIC 31 GX3/16"
NEEDLE, DISPOSABLE MISCELLANEOUS
Qty: 200 PEN NEEDLE | Refills: 11 | Status: SHIPPED | OUTPATIENT
Start: 2018-10-25 | End: 2020-07-28

## 2018-10-25 RX ORDER — PEN NEEDLE, DIABETIC 29 G X1/2"
NEEDLE, DISPOSABLE MISCELLANEOUS
Refills: 0 | OUTPATIENT
Start: 2018-10-25

## 2018-10-26 RX ORDER — PEN NEEDLE, DIABETIC 29 G X1/2"
NEEDLE, DISPOSABLE MISCELLANEOUS
Refills: 0 | OUTPATIENT
Start: 2018-10-26

## 2018-10-30 ENCOUNTER — OFFICE VISIT (OUTPATIENT)
Dept: ENDOCRINOLOGY | Age: 48
End: 2018-10-30

## 2018-10-30 VITALS
HEIGHT: 65 IN | SYSTOLIC BLOOD PRESSURE: 122 MMHG | BODY MASS INDEX: 32.21 KG/M2 | DIASTOLIC BLOOD PRESSURE: 84 MMHG | HEART RATE: 87 BPM | WEIGHT: 193.3 LBS

## 2018-10-30 DIAGNOSIS — R79.89 ELEVATED LFTS: ICD-10-CM

## 2018-10-30 DIAGNOSIS — I10 ESSENTIAL HYPERTENSION: ICD-10-CM

## 2018-10-30 DIAGNOSIS — E78.5 HYPERLIPIDEMIA LDL GOAL <100: ICD-10-CM

## 2018-10-30 NOTE — PROGRESS NOTES
Chief Complaint Patient presents with  Diabetes  
  pcp and pharmacy confirmed  Diabetic Foot Exam  
  due History of Present Illness: Amy Jamisno is a 53 y.o. female here for follow up of diabetes. Weight down 6 lbs since last visit in 7/18. Did have an admission in 8/18 at [address] [place] [address] for low calcium and potassium and was sent home on K supplements but her K was 5.6 on labs with Dr. Grisel Levy at the end of 8/18 and has been taking off these supplements and her K was back to normal with labs with Dr. Polly Jackson but is still taking some mag supplements. Has had spikes in her blood sugar when her pain is uncontrolled and can be in the 300s or higher. However when her sugar is controlled, it is usually in the 100-200 range. She has enjoyed using the freestyle ellen sensor and this has helped her gain better control of her sugars. Did just decrease her lantus from 36 to 30 units daily as of this morning as she was having more lows the past few days now that her pain is better controlled. she has the following indications to continue treatment with freestyle ellen: 
1) she has type 1 diabetes and is on an intensive insulin regimen with 4 injections of insulin per day 2) she tests her blood sugar up to 8 times per day and makes treatment decisions off her blood sugar readings and her sensor readings 3) she requires adjustments to her insulin doses settings based on her sensor readings 4) she has benefitted from therapeutic continuous glucose monitoring and I recommend that she continue this 5) she is seen in my office every 3 months Current Outpatient Medications Medication Sig  Insulin Needles, Disposable, (BD ULTRA-FINE MINI PEN NEEDLE) 31 gauge x 3/16\" ndle Use as directed up to 8 times daily  gabapentin (NEURONTIN) 300 mg capsule TAKE TWO CAPSULES BY MOUTH THREE TIMES A DAY  atorvastatin (LIPITOR) 40 mg tablet Take 1 Tab by mouth daily.  ONETOUCH ULTRA BLUE TEST STRIP strip USE AS DIRECTED TO TEST UP TO 8 TIMES PER DAY  nystatin (MYCOSTATIN) 100,000 unit/mL suspension Take 5 mL by mouth four (4) times daily. swish and spit  esomeprazole (NEXIUM) 40 mg capsule Take 40 mg by mouth daily.  levonorgestrel (MIRENA) 20 mcg/24 hr (5 years) IUD 1 Device by IntraUTERine route once.  insulin glargine (LANTUS SOLOSTAR U-100 INSULIN) 100 unit/mL (3 mL) inpn 30 Units by SubCUTAneous route daily. Dose change 10/30/18--updated med list--did not send prescription to the pharmacy  furosemide (LASIX) 20 mg tablet Take 40 mg by mouth two (2) times a day.  ARIPiprazole (ABILIFY) 30 mg tablet Take 30 mg by mouth daily.  LORazepam (ATIVAN) 1 mg tablet Take 1 mg by mouth three (3) times daily.  fentaNYL (DURAGESIC) 100 mcg/hr PATCH 1 Patch by TransDERmal route every seventy-two (72) hours.  carvedilol (COREG) 12.5 mg tablet Take 12.5 mg by mouth daily.  FREESTYLE MARIELA SENSOR kit Use 1 sensor every 10 days as directed  HYDROcodone-acetaminophen (NORCO)  mg tablet Take 1 Tab by mouth every eight (8) hours as needed.  acetaminophen (TYLENOL) 325 mg tablet Take 2 Tabs by mouth every six (6) hours as needed for Fever.  glucagon (GLUCAGON EMERGENCY KIT, HUMAN,) 1 mg injection Use as directed  magnesium oxide (MAG-OX) 400 mg tablet Take 800 mg by mouth two (2) times a day.  ONE TOUCH DELICA 33 gauge misc Test 8 times daily--DX E10.65  
 venlafaxine-SR (EFFEXOR XR) 150 mg capsule Take 150 mg by mouth two (2) times a day.  insulin lispro (HUMALOG KWIKPEN) 100 unit/mL kwikpen 1:15 for carbs and 1:30 > 150 for correction during the day and > 180 at bedtime. Max 50 units per day No current facility-administered medications for this visit. Allergies Allergen Reactions  Zocor [Simvastatin] Myalgia  Lisinopril Cough Review of Systems: - Eyes: no blurry vision or double vision - Cardiovascular: no chest pain - Respiratory: no shortness of breath - Musculoskeletal: no myalgias - Neurological: (+) numbness/tingling in extremities Physical Examination: 
Blood pressure 122/84, pulse 87, height 5' 5\" (1.651 m), weight 193 lb 4.8 oz (87.7 kg). - General: pleasant, no distress, good eye contact  
- Neck: no carotid bruits - Cardiovascular: regular, normal rate, nl s1 and s2, no m/r/g,  
- Respiratory: clear bilaterally - Integumentary: 1+ non-pitting edema,  
- Psychiatric: normal mood and affect Diabetic foot exam:  
 
Left Foot: 
 Visual Exam: callous - mild Pulse DP: 2+ (normal) Filament test: reduced sensation Vibratory sensation: diminished Right Foot: 
 Visual Exam: callous - mild Pulse DP: 2+ (normal) Filament test: reduced sensation Vibratory sensation: diminished Data Reviewed:  
Component Latest Ref Rng & Units 8/23/2018 8/23/2018 8/23/2018 11:00 AM 11:00 AM 11:00 AM  
Glucose 65 - 99 mg/dL   403 (H) BUN 
    6 - 24 mg/dL   15 Creatinine 
    0.57 - 1.00 mg/dL   1.12 (H) GFR est non-AA 
    >59 mL/min/1.73   59 (L) GFR est AA 
    >59 mL/min/1.73   68 BUN/Creatinine ratio 9 - 23   13 Sodium 134 - 144 mmol/L   131 (L) Potassium 3.5 - 5.2 mmol/L   5.6 (H) Chloride 96 - 106 mmol/L   88 (L)  
CO2 
    20 - 29 mmol/L   25 Calcium 8.7 - 10.2 mg/dL   9.7 Protein, total 
    6.0 - 8.5 g/dL   7.1 Albumin 3.5 - 5.5 g/dL   4.6 GLOBULIN, TOTAL 
    1.5 - 4.5 g/dL   2.5 A-G Ratio 1.2 - 2.2   1.8 Bilirubin, total 
    0.0 - 1.2 mg/dL   0.3 Alk. phosphatase 39 - 117 IU/L   96 AST 
    0 - 40 IU/L   19  
ALT (SGPT) 0 - 32 IU/L   15 Cholesterol, total 
    100 - 199 mg/dL  166 Triglyceride 0 - 149 mg/dL  137 HDL Cholesterol >39 mg/dL  45 VLDL, calculated 5 - 40 mg/dL  27 LDL, calculated 0 - 99 mg/dL  94 Hemoglobin A1c, (calculated) 4.8 - 5.6 % 9.2 (H) Estimated average glucose 
    mg/dL 217 Assessment/Plan:  
 
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 Her blood sugars are improving with using CGM and hopefully with fine tuning her insulin doses her sugars will continue to improve. - cont lantus 30 units in the morning  
- cont humalog 1:15 for carbs and take 1:30 for correction > 150 during the day and > 180 at night 
- check bs up to 8 times daily due to fluctuating blood sugars 
- cont freestyle ellen CGM 
- foot exam done 10/18 
- microalbumin was 69 in 7/10 up to 392 in March 2011 and 621 in May down to 216 in October 2011 and 37.3 in 4/14 and up to 183 in 12/15 and 300 in 9/16, down to 85 in 4/18 
- optho UTD 11/17 
- check Hgb A1c, cmp, and microalbumin prior to next visit 2) HTN NOS (401.9): Blood pressure was at goal < 140/90. She is currently off losartan since her hospital in 11/17 stay likely due to DEBORAH. - cont coreg 12.5 mg daily 
- cont lasix 20 mg daily per Dr. Dean Kapoor 3) Hyperlipidemia (272.4): Given DM, goal LDL is < 100, non-HDL < 130, and TGs < 150. LDL was 146 in January 2010, down to 124 in July and 93 in March 2011 with taking 5 mg of crestor daily. Her crestor was changed to pravastatin by Dr. Kenisha Leyva because of cost in Feb 2012.   LDL 93 in 8/12 but her HDL was high at 138 due to ETOH intake. LDL down to 39 in 11/12 but up to 102 in 3/13. Off pravastatin at this time and previously was on 10 mg daily.  in 1/14. Up to 167 in 4/14 so started lovastatin 20 mg daily but she couldn't afford this. She has been started on crestor 40 mg daily by crossover clinic and LDL 64 in 10/14 and 55 in 1/15. Was 37 in 3/16 so dose decreased to 20 mg daily and LDL 23 in 2/17. Was changed to lipitor 1/2 of 80 mg daily when clinic couldn't get crestor any longer and LDL 37 in 9/17. This was stopped during her hospital stay in 11/17 but was restarted by Dr. Bhavana Bunn in 12/17 and 42 in 4/18. Up to 94 in 8/18 
- cont lipitor 40 mg daily - check lipids prior to next visit 4) Elevated LFTs (790.6): I told her previously that her LFTs were elevated likely due to ETOH intake so I advised her to cut back on this and her repeat LFTs were normal in 3/13 and 1/14 and 4/14 and 10/14 and 1/15 and 12/15 and 3/16. AST up to 76 in 2/17 but back to normal in 5/17 and 11/17 and 8/18 so will follow this. Patient Instructions 1) Stay on lantus 30 units daily for the next 3-4 days to see if this keeps your sugar closer to 100-140 in the morning. If so, this is the right dose. If you are rising over 140 in the morning without eating at bedtime, then try 32 units in the morning. 2) My hope is with better diabetes control, your nerve pain will improve and I prefer not to change from gabapentin to lyrica at this time. 3) Your blood pressure is well controlled today. Follow-up Disposition: 
Return for 2/7/19 at 1:30pm. 
 
Copy sent to: 
Dr. Al Bunn via [facility] Dr. Dorothea Wayne

## 2018-10-30 NOTE — PATIENT INSTRUCTIONS
1) Stay on lantus 30 units daily for the next 3-4 days to see if this keeps your sugar closer to 100-140 in the morning. If so, this is the right dose. If you are rising over 140 in the morning without eating at bedtime, then try 32 units in the morning. 2) My hope is with better diabetes control, your nerve pain will improve and I prefer not to change from gabapentin to lyrica at this time. 3) Your blood pressure is well controlled today.

## 2018-11-06 ENCOUNTER — OFFICE VISIT (OUTPATIENT)
Dept: INTERNAL MEDICINE CLINIC | Age: 48
End: 2018-11-06

## 2018-11-06 VITALS
BODY MASS INDEX: 31.65 KG/M2 | RESPIRATION RATE: 16 BRPM | TEMPERATURE: 98.8 F | SYSTOLIC BLOOD PRESSURE: 120 MMHG | HEART RATE: 99 BPM | HEIGHT: 65 IN | WEIGHT: 190 LBS | OXYGEN SATURATION: 98 % | DIASTOLIC BLOOD PRESSURE: 77 MMHG

## 2018-11-06 DIAGNOSIS — J06.9 ACUTE URI: ICD-10-CM

## 2018-11-06 RX ORDER — ALBUTEROL SULFATE 90 UG/1
1 AEROSOL, METERED RESPIRATORY (INHALATION)
Qty: 1 INHALER | Refills: 11 | Status: SHIPPED | OUTPATIENT
Start: 2018-11-06 | End: 2019-06-20

## 2018-11-06 RX ORDER — CODEINE PHOSPHATE AND GUAIFENESIN 10; 100 MG/5ML; MG/5ML
5 SOLUTION ORAL
Qty: 120 ML | Refills: 0 | Status: SHIPPED | OUTPATIENT
Start: 2018-11-06 | End: 2019-02-11 | Stop reason: ALTCHOICE

## 2018-11-06 RX ORDER — AMOXICILLIN AND CLAVULANATE POTASSIUM 875; 125 MG/1; MG/1
1 TABLET, FILM COATED ORAL EVERY 12 HOURS
Qty: 14 TAB | Refills: 0 | Status: SHIPPED | OUTPATIENT
Start: 2018-11-06 | End: 2018-11-13

## 2018-11-06 NOTE — PROGRESS NOTES
1. Have you been to the ER, urgent care clinic since your last visit? Hospitalized since your last visit?no  2. Have you seen or consulted any other health care providers outside of the [facility] since your last visit? Include any pap smears or colon screening.  no

## 2018-11-06 NOTE — PROGRESS NOTES
SUBJECTIVE  Ms. Megan cMgee presents today acutely for     Chief Complaint   Patient presents with    URI     pt here today c.o sore throat, coughing up mucous x 4 day        \"The chunks I've been coughing up are just disgusting. \"   Wheezing. Requesting refill of her cough syrup with codeine. OBJECTIVE  Visit Vitals  /77 (BP 1 Location: Left arm, BP Patient Position: Sitting)   Pulse 99   Temp 98.8 °F (37.1 °C) (Oral)   Resp 16   Ht 5' 5\" (1.651 m)   Wt 190 lb (86.2 kg)   SpO2 98%   BMI 31.62 kg/m²     Gen: Pleasant 53 y.o.  female in NAD.   HEENT: PERRLA. EOMI. OP moist and pink.  Neck: Supple.  No LAD.  HEART: RRR, No M/G/R.   LUNGS: Expiratory wheezes noted; coughs frequently.   ABDOMEN: S, NT, ND, BS+.   EXTREMITIES: Warm. ASSESSMENT / PLAN    ICD-10-CM ICD-9-CM   1. Acute URI--Bronchitis, acute J06.9 465.9     Orders Placed This Encounter    guaiFENesin-codeine (ROBAFEN AC) 100-10 mg/5 mL solution     Sig: Take 5 mL by mouth three (3) times daily as needed for Cough. Max Daily Amount: 15 mL. Dispense:  120 mL     Refill:  0    amoxicillin-clavulanate (AUGMENTIN) 875-125 mg per tablet     Sig: Take 1 Tab by mouth every twelve (12) hours for 7 days. Dispense:  14 Tab     Refill:  0    guaiFENesin ER (MUCINEX) 1,200 mg Ta12 ER tablet     Sig: Take 1 Tab by mouth two (2) times a day. Dispense:  60 Tab     Refill:  1    albuterol (PROVENTIL HFA, VENTOLIN HFA, PROAIR HFA) 90 mcg/actuation inhaler     Sig: Take 1 Puff by inhalation every four (4) hours as needed for Wheezing. Dispense:  1 Inhaler     Refill:  11       I have reviewed with the patient details of the assessment and plan and all questions were answered. Relevant patient education was performed. Follow-up Disposition:  Return if symptoms worsen or fail to improve.

## 2018-12-01 RX ORDER — INSULIN GLARGINE 100 [IU]/ML
INJECTION, SOLUTION SUBCUTANEOUS
Qty: 15 ML | Refills: 11 | Status: SHIPPED | OUTPATIENT
Start: 2018-12-01 | End: 2019-05-21

## 2018-12-02 RX ORDER — ESOMEPRAZOLE MAGNESIUM 40 MG/1
CAPSULE, DELAYED RELEASE ORAL
Qty: 30 CAP | Refills: 8 | Status: SHIPPED | OUTPATIENT
Start: 2018-12-02 | End: 2019-10-20 | Stop reason: SDUPTHER

## 2018-12-17 RX ORDER — CARVEDILOL 12.5 MG/1
TABLET ORAL
Qty: 30 TAB | Refills: 11 | Status: SHIPPED | OUTPATIENT
Start: 2018-12-17 | End: 2019-02-15

## 2019-01-23 ENCOUNTER — TELEPHONE (OUTPATIENT)
Dept: ENDOCRINOLOGY | Age: 49
End: 2019-01-23

## 2019-01-23 RX ORDER — LOSARTAN POTASSIUM 25 MG/1
25 TABLET ORAL DAILY
COMMUNITY
Start: 2019-01-23 | End: 2019-05-21

## 2019-01-24 ENCOUNTER — TELEPHONE (OUTPATIENT)
Dept: ENDOCRINOLOGY | Age: 49
End: 2019-01-24

## 2019-01-24 NOTE — TELEPHONE ENCOUNTER
Patient would like for you to call her she stated per VM that she has not been able to get her blood sugar under 300 all day and she feels awful.   She stated if you could call her to see if she needs to go to the ER.  [phone]

## 2019-01-24 NOTE — TELEPHONE ENCOUNTER
Received a page from patient that she has had \"hi\" readings on her meter for the past 8 hours and has taken her normal dose of 34 units of lantus and has taken a total of 53 units of novolog today and last took 18 units about 20 minutes before she called me. She was started on losartan 25 mg daily by Dr. Baron Porter and took her first dose this morning but has not been on any other new meds and has not been in any pain or any stress or have any signs of infection aside from a few sniffles. I asked her if she felt very thirsty or has been urinating a lot and she has not felt too bad and was not slurring her words like she normally does when her sugar is high. I asked her if she could have a bad batch of strips so she checked her sugar on the phone with me with a different meter and her sugar was 215 and then repeated with another meter and it was 218. Therefore I told her that she does not need to go to the ER and get IV insulin and fluid. Instead she should eat some carbs right now and check her sugar over the next 2 hours to avoid having a low sugar given the amount of novolog she has taken. She will eat a bowl of special K now and will give me an update tomorrow over [employer]. she voiced understanding of this plan.

## 2019-01-24 NOTE — TELEPHONE ENCOUNTER
Pt called stated, she just checked her blood sugar and she's getting a reading of under 345. Please give pt when you get a chance @ [phone].

## 2019-01-24 NOTE — TELEPHONE ENCOUNTER
Called and spoke with pt. After she and I spoke on the phone last night she ate the special K and her sugar came down to 100 2 hours later. Then she at 1 more cup of cereal and her sugar was up to 176 at 4 am.  She took her normal dose of lantus 34 units and 1 unit of humalog but then her sugar started climbing back into the 300s and has stayed between 300-400 all day. She has been taking humalog per my scale in 4/18 every 3 hours and has been unable to get her sugar under 300. She states her urine appears orange and is wondering if she could have a UTI. She made an appointment with Dr. Micah Read for tomorrow morning but is wondering if she needs to go to the ER now. I told her to take a one time dose of lantus 6 units in case she does have an infection as she will need more long acting insulin in her system and to try and push plenty of fluids and not eat any carbs today. If her sugars continue to worsen, she will need to go to the ER tonight for evaluation but otherwise she should keep her appointment tomorrow with Dr. Micah Read. If her sugar is over 200 tomorrow morning she can plan on taking 40 units of lantus instead of 34 units. she voiced understanding of this plan.

## 2019-01-25 ENCOUNTER — OFFICE VISIT (OUTPATIENT)
Dept: INTERNAL MEDICINE CLINIC | Age: 49
End: 2019-01-25

## 2019-01-25 ENCOUNTER — HOSPITAL ENCOUNTER (OUTPATIENT)
Dept: LAB | Age: 49
Discharge: HOME | End: 2019-01-25
Payer: MEDICARE

## 2019-01-25 VITALS
WEIGHT: 211 LBS | SYSTOLIC BLOOD PRESSURE: 152 MMHG | BODY MASS INDEX: 35.16 KG/M2 | HEIGHT: 65 IN | OXYGEN SATURATION: 97 % | HEART RATE: 115 BPM | DIASTOLIC BLOOD PRESSURE: 88 MMHG | TEMPERATURE: 97.6 F | RESPIRATION RATE: 16 BRPM

## 2019-01-25 DIAGNOSIS — F32.A ANXIETY AND DEPRESSION: ICD-10-CM

## 2019-01-25 DIAGNOSIS — F11.20 NARCOTIC DEPENDENCE (HCC): ICD-10-CM

## 2019-01-25 DIAGNOSIS — F41.9 ANXIETY AND DEPRESSION: ICD-10-CM

## 2019-01-25 DIAGNOSIS — I10 ESSENTIAL HYPERTENSION: ICD-10-CM

## 2019-01-25 DIAGNOSIS — E10.21 DIABETIC NEPHROPATHY ASSOCIATED WITH TYPE 1 DIABETES MELLITUS (HCC): ICD-10-CM

## 2019-01-25 DIAGNOSIS — Z23 ENCOUNTER FOR IMMUNIZATION: ICD-10-CM

## 2019-01-25 DIAGNOSIS — R30.0 DYSURIA: ICD-10-CM

## 2019-01-25 DIAGNOSIS — Z72.0 TOBACCO ABUSE: ICD-10-CM

## 2019-01-25 PROBLEM — E66.01 SEVERE OBESITY (HCC): Status: ACTIVE | Noted: 2019-01-25

## 2019-01-25 LAB
BILIRUB UR QL STRIP: NEGATIVE
GLUCOSE UR-MCNC: NEGATIVE MG/DL
KETONES P FAST UR STRIP-MCNC: NEGATIVE MG/DL
PH UR STRIP: 5.5 [PH] (ref 4.6–8)
PROT UR QL STRIP: NEGATIVE
SP GR UR STRIP: 1 (ref 1–1.03)
UA UROBILINOGEN AMB POC: NORMAL (ref 0.2–1)
URINALYSIS CLARITY POC: CLEAR
URINALYSIS COLOR POC: YELLOW
URINE BLOOD POC: NEGATIVE
URINE LEUKOCYTES POC: NORMAL
URINE NITRITES POC: NEGATIVE

## 2019-01-25 PROCEDURE — 80061 LIPID PANEL: CPT

## 2019-01-25 PROCEDURE — 87086 URINE CULTURE/COLONY COUNT: CPT

## 2019-01-25 PROCEDURE — 83036 HEMOGLOBIN GLYCOSYLATED A1C: CPT

## 2019-01-25 PROCEDURE — 80053 COMPREHEN METABOLIC PANEL: CPT

## 2019-01-25 PROCEDURE — 36415 COLL VENOUS BLD VENIPUNCTURE: CPT

## 2019-01-25 PROCEDURE — 84443 ASSAY THYROID STIM HORMONE: CPT

## 2019-01-25 RX ORDER — VARENICLINE TARTRATE 25 MG
KIT ORAL
Qty: 1 DOSE PACK | Refills: 0 | Status: SHIPPED | OUTPATIENT
Start: 2019-01-25 | End: 2019-05-21

## 2019-01-25 RX ORDER — VARENICLINE TARTRATE 1 MG/1
1 TABLET, FILM COATED ORAL 2 TIMES DAILY
Qty: 60 TAB | Refills: 3 | Status: SHIPPED | OUTPATIENT
Start: 2019-02-11 | End: 2019-05-21

## 2019-01-25 NOTE — PROGRESS NOTES
Felipe Espinosa is a 53 y.o. female who presents for evaluation of routine follow up. Last seen by me aug 23, 2018. Saw dr milan here nov 6 with uri. Got better with augmentin. Sugars have been elevated past few days, she states ever since she stopped her coreg and started cozaar (done by renal). Complains of some mild chest congestion and dysuria, otherwise doing well. Really likes her new vicki free style glucometer. Also wants to try chantix again to help her stop smoking.       ROS:  Constitutional: negative for fevers, chills, anorexia and weight loss  Eyes:   negative for visual disturbance and irritation  ENT:   negative for tinnitus,sore throat,nasal congestion,ear pain,hoarseness  Respiratory:  negative for cough, hemoptysis, dyspnea,wheezing  CV:   negative for chest pain, palpitations, lower extremity edema  GI:   negative for nausea, vomiting, diarrhea, abdominal pain,melena  Genitourinary: negative for frequency, dysuria and hematuria  Musculoskel: negative for myalgias, arthralgias, back pain, muscle weakness, joint pain  Neurological:  negative for headaches, dizziness, focal weakness, numbness  Psychiatric:     Negative for depression or anxiety      Past Medical History:   Diagnosis Date    Achilles tendon rupture     along with torn right patellar/femoral ligament    Arthritis     rt. foot    Chronic pain     abdominal pain    Diabetes (HCC)     IDDM on insulin pump and sensor    DM type 1 (diabetes mellitus, type 1) ([facility] [address]     age 25    Endometriosis     s/p ex-lap 4x    Gastric ulcer     GERD (gastroesophageal reflux disease)     Herpes     Other and unspecified hyperlipidemia     Panic attacks     Psychiatric disorder     anxiety, panic attacks    PTSD (post-traumatic stress disorder)     Unspecified essential hypertension        Past Surgical History:   Procedure Laterality Date    HX GYN      2 d&c's    HX GYN      laparoscopies x3    HX ORTHOPAEDIC  2002 achiles tendon repair,talus repair    HX ORTHOPAEDIC      injections x2 to right shoulder       Family History   Problem Relation Age of Onset    Diabetes Mother         diet controlled    Diabetes Father         R foot amupation    Liver Disease Father     Heart Disease Paternal Uncle         MI in his 46s    Stroke Neg Hx        Social History     Socioeconomic History    Marital status: SINGLE     Spouse name: Not on file    Number of children: Not on file    Years of education: Not on file    Highest education level: Not on file   Social Needs    Financial resource strain: Not on file    Food insecurity - worry: Not on file    Food insecurity - inability: Not on file   Huan Xiong needs - medical: Not on file   Huan Xiong needs - non-medical: Not on file   Occupational History    Not on file   Tobacco Use    Smoking status: Current Some Day Smoker     Packs/day: 1.50     Years: 17.00     Pack years: 25.50     Types: Cigarettes    Smokeless tobacco: Current User    Tobacco comment: Trying to quit   Substance and Sexual Activity    Alcohol use: No     Comment: a beer every few months    Drug use: No    Sexual activity: Yes     Partners: Male   Other Topics Concern    Not on file   Social History Narrative    Not on file            Visit Vitals  /88 (BP 1 Location: Right arm, BP Patient Position: Sitting)   Pulse (!) 115   Temp 97.6 °F (36.4 °C) (Oral)   Resp 16   Ht 5' 5\" (1.651 m)   Wt 211 lb (95.7 kg)   SpO2 97%   BMI 35.11 kg/m²       Physical Examination:   General - Well appearing female  HEENT - PERRL, TM no erythema/opacification, normal nasal turbinates, no oropharyngeal erythema or exudate, MMM  Neck - supple, no bruits, no thyroidomegaly, no lymphadenopathy  Pulm - clear to auscultation bilaterally  Cardio - RRR, normal S1 S2, no murmur  Abd - soft, nontender, no masses, no HSM  Extrem - no edema, +2 distal pulses  Neuro-  No focal deficits, CN intact Assessment/Plan:    1.  htn and tachycardia--resume coreg. Continue cozaar  2. Tobacco abuse--rx for chantix  3.  hyperlipids--check flp, cmp. On lipitor  4. Anxiety and depression/bipolar--follows with psych, doing well with abilify, effexor, and ativan  5. Chronic pain syndrome--on neurontin, fentanyl patch, norco  6. Dysuria--ua not impressive with trace leuk esterase. Will send for cx  7. Routine adult health maintenance--flu shot given today. Had mamm and pap/pelvic with dr Erika Negron.     rtc 6 months        William Root III, DO

## 2019-01-25 NOTE — PATIENT INSTRUCTIONS

## 2019-01-26 LAB
ALBUMIN SERPL-MCNC: 4.6 G/DL (ref 3.5–5.5)
ALBUMIN/GLOB SERPL: 2.1 {RATIO} (ref 1.2–2.2)
ALP SERPL-CCNC: 105 IU/L (ref 39–117)
ALT SERPL-CCNC: 16 IU/L (ref 0–32)
AST SERPL-CCNC: 25 IU/L (ref 0–40)
BILIRUB SERPL-MCNC: 0.2 MG/DL (ref 0–1.2)
BUN SERPL-MCNC: 28 MG/DL (ref 6–24)
BUN/CREAT SERPL: 24 (ref 9–23)
CALCIUM SERPL-MCNC: 9.4 MG/DL (ref 8.7–10.2)
CHLORIDE SERPL-SCNC: 95 MMOL/L (ref 96–106)
CHOLEST SERPL-MCNC: 155 MG/DL (ref 100–199)
CO2 SERPL-SCNC: 21 MMOL/L (ref 20–29)
CREAT SERPL-MCNC: 1.15 MG/DL (ref 0.57–1)
EST. AVERAGE GLUCOSE BLD GHB EST-MCNC: 240 MG/DL
GLOBULIN SER CALC-MCNC: 2.2 G/DL (ref 1.5–4.5)
GLUCOSE SERPL-MCNC: 123 MG/DL (ref 65–99)
HBA1C MFR BLD: 10 % (ref 4.8–5.6)
HDLC SERPL-MCNC: 45 MG/DL
LDLC SERPL CALC-MCNC: 70 MG/DL (ref 0–99)
POTASSIUM SERPL-SCNC: 4.8 MMOL/L (ref 3.5–5.2)
PROT SERPL-MCNC: 6.8 G/DL (ref 6–8.5)
SODIUM SERPL-SCNC: 136 MMOL/L (ref 134–144)
TRIGL SERPL-MCNC: 199 MG/DL (ref 0–149)
TSH SERPL DL<=0.005 MIU/L-ACNC: 5.45 UIU/ML (ref 0.45–4.5)
VLDLC SERPL CALC-MCNC: 40 MG/DL (ref 5–40)

## 2019-01-27 LAB — BACTERIA UR CULT: NORMAL

## 2019-01-28 NOTE — PROGRESS NOTES
Urine cx with no growth, so no need for any abx. Diabetes needs some more help, a1c up to 10.0 for average sugar of 240. Kidney function improved though, and cholesterol levels look good.

## 2019-01-29 ENCOUNTER — TELEPHONE (OUTPATIENT)
Dept: INTERNAL MEDICINE CLINIC | Age: 49
End: 2019-01-29

## 2019-02-05 NOTE — PERIOP NOTES
[facility]  Ambulatory Surgery Unit  Pre-operative Instructions    Surgery/Procedure Date  Thursday, February 7, 2019            Tentative Arrival Time 0915      1. On the day of your surgery/procedure, please report to the Ambulatory Surgery Unit Registration Desk and sign in at your designated time. The Ambulatory Surgery Unit is located in [facility] ER on the [facility] side of the [facility] across from the [address] [place] [address]. Please have all of your health insurance cards and a photo ID. 2. You must have someone with you to drive you home, as you should not drive a car for 24 hours following anesthesia. Please make arrangements for a responsible adult friend or family member to stay with you for at least the first 24 hours after your surgery. 3. Do not have anything to eat or drink (including water, gum, mints, coffee, juice) after 11:59 PM, Wednesday. This may not apply to medications prescribed by your physician. (Please note below the special instructions with medications to take the morning of surgery, if applicable.)    4. We recommend you do not drink any alcoholic beverages for 24 hours before and after your surgery. 5. Contact your surgeons office for instructions on the following medications: non-steroidal anti-inflammatory drugs (i.e. Advil, Aleve), vitamins, and supplements. (Some surgeons will want you to stop these medications prior to surgery and others may allow you to take them)   **If you are currently taking Plavix, Coumadin, Aspirin and/or other blood-thinning agents, contact your surgeon for instructions. ** Your surgeon will partner with the physician prescribing these medications to determine if it is safe to stop or if you need to continue taking. Please do not stop taking these medications without instructions from your surgeon.     6. In an effort to help prevent surgical site infection, we ask that you shower with an anti-bacterial soap (i.e. Dial/Safeguard, or the soap provided to you at your preadmission testing appointment) for 3 days prior to and on the morning of surgery, using a fresh towel after each shower. (Please begin this process with fresh bed linens.) Do not apply any lotions, powders, or deodorants after the shower on the day of your procedure. If applicable, please do not shave the operative site for 48 hours prior to surgery. 7. Wear comfortable clothes. Wear glasses instead of contacts. Do not bring any jewelry or money (other than copays or fees as instructed). Do not wear make-up, particularly mascara, the morning of your surgery. Do not wear nail polish, particularly if you are having foot /hand surgery. Wear your hair loose or down, no ponytails, buns, walter pins or clips. All body piercings must be removed. 8. You should understand that if you do not follow these instructions your surgery may be cancelled. If your physical condition changes (i.e. fever, cold or flu) please contact your surgeon as soon as possible. 9. It is important that you be on time. If a situation occurs where you may be late, or if you have any questions or problems, please call [phone].    10. Your surgery time may be subject to change. You will receive a phone call the day prior to surgery to confirm your arrival time. 11. Pediatric patients: please bring a change of clothes, diapers, bottle/sippy cup, pacifier, etc.      Special Instructions: Take all medications and inhalers, as prescribed, on the morning of surgery with a sip of water EXCEPT: no diabetic medications day of surgery. Insulin Dependent Diabetic patients: Take your diabetic medications as prescribed the day before surgery. Hold all diabetic medications the day of surgery. If you are scheduled to arrive for surgery after 8:00 AM, and your AM blood sugar is >200, please call Ambulatory Surgery.       I understand a pre-operative phone call will be made to verify my surgery time. In the event that I am not available, I give permission for a message to be left on my answering service and/or with another person?       yes    Preop instructions reviewed  Pt verbalized understanding.      ___________________      ___________________      ________________  (Signature of Patient)          (Witness)                   (Date and Time)

## 2019-02-06 ENCOUNTER — TELEPHONE (OUTPATIENT)
Dept: ENDOCRINOLOGY | Age: 49
End: 2019-02-06

## 2019-02-06 ENCOUNTER — ANESTHESIA EVENT (OUTPATIENT)
Dept: SURGERY | Age: 49
End: 2019-02-06
Payer: MEDICARE

## 2019-02-06 NOTE — TELEPHONE ENCOUNTER
Patient was scheduled to see you tomorrow at 1:30 PM, but had to cancel because she's having surgery. She has trigger finger and the doctor wants to operate tomorrow. She would like to know when she can reschedule? I will be happy to call the patient back with a date and time.

## 2019-02-06 NOTE — PERIOP NOTES
Patient states that friend Joshua Russell then boyfriend once he gets home will be with them for at least 24 hours following today's procedure.

## 2019-02-07 ENCOUNTER — ANESTHESIA (OUTPATIENT)
Dept: SURGERY | Age: 49
End: 2019-02-07
Payer: MEDICARE

## 2019-02-07 ENCOUNTER — HOSPITAL ENCOUNTER (OUTPATIENT)
Age: 49
Setting detail: OUTPATIENT SURGERY
Discharge: HOME | End: 2019-02-07
Attending: ORTHOPAEDIC SURGERY | Admitting: ORTHOPAEDIC SURGERY
Payer: MEDICARE

## 2019-02-07 VITALS
TEMPERATURE: 97.8 F | OXYGEN SATURATION: 99 % | RESPIRATION RATE: 15 BRPM | SYSTOLIC BLOOD PRESSURE: 101 MMHG | HEART RATE: 80 BPM | BODY MASS INDEX: 35.34 KG/M2 | HEIGHT: 64 IN | WEIGHT: 207 LBS | DIASTOLIC BLOOD PRESSURE: 67 MMHG

## 2019-02-07 DIAGNOSIS — M79.609 POSTOPERATIVE PAIN OF EXTREMITY: Primary | ICD-10-CM

## 2019-02-07 DIAGNOSIS — G89.18 POSTOPERATIVE PAIN OF EXTREMITY: Primary | ICD-10-CM

## 2019-02-07 LAB
GLUCOSE BLD STRIP.AUTO-MCNC: 198 MG/DL (ref 65–100)
GLUCOSE BLD STRIP.AUTO-MCNC: 219 MG/DL (ref 65–100)
GLUCOSE BLD STRIP.AUTO-MCNC: 246 MG/DL (ref 65–100)
SERVICE CMNT-IMP: ABNORMAL

## 2019-02-07 PROCEDURE — 76030000000 HC AMB SURG OR TIME 0.5 TO 1: Performed by: ORTHOPAEDIC SURGERY

## 2019-02-07 PROCEDURE — 74011250636 HC RX REV CODE- 250/636: Performed by: ORTHOPAEDIC SURGERY

## 2019-02-07 PROCEDURE — 74011250636 HC RX REV CODE- 250/636

## 2019-02-07 PROCEDURE — 77030000032 HC CUF TRNQT ZIMM -B: Performed by: ORTHOPAEDIC SURGERY

## 2019-02-07 PROCEDURE — 76210000050 HC AMBSU PH II REC 0.5 TO 1 HR: Performed by: ORTHOPAEDIC SURGERY

## 2019-02-07 PROCEDURE — 77030018836 HC SOL IRR NACL ICUM -A: Performed by: ORTHOPAEDIC SURGERY

## 2019-02-07 PROCEDURE — 76060000061 HC AMB SURG ANES 0.5 TO 1 HR: Performed by: ORTHOPAEDIC SURGERY

## 2019-02-07 PROCEDURE — 77030002916 HC SUT ETHLN J&J -A: Performed by: ORTHOPAEDIC SURGERY

## 2019-02-07 PROCEDURE — 74011000250 HC RX REV CODE- 250: Performed by: ORTHOPAEDIC SURGERY

## 2019-02-07 PROCEDURE — 74011250636 HC RX REV CODE- 250/636: Performed by: ANESTHESIOLOGY

## 2019-02-07 PROCEDURE — 82962 GLUCOSE BLOOD TEST: CPT

## 2019-02-07 RX ORDER — MORPHINE SULFATE 10 MG/ML
2 INJECTION, SOLUTION INTRAMUSCULAR; INTRAVENOUS
Status: DISCONTINUED | OUTPATIENT
Start: 2019-02-07 | End: 2019-02-07 | Stop reason: HOSPADM

## 2019-02-07 RX ORDER — DIPHENHYDRAMINE HYDROCHLORIDE 50 MG/ML
12.5 INJECTION, SOLUTION INTRAMUSCULAR; INTRAVENOUS AS NEEDED
Status: DISCONTINUED | OUTPATIENT
Start: 2019-02-07 | End: 2019-02-07 | Stop reason: HOSPADM

## 2019-02-07 RX ORDER — SODIUM CHLORIDE 0.9 % (FLUSH) 0.9 %
5-40 SYRINGE (ML) INJECTION EVERY 8 HOURS
Status: DISCONTINUED | OUTPATIENT
Start: 2019-02-07 | End: 2019-02-07 | Stop reason: HOSPADM

## 2019-02-07 RX ORDER — LIDOCAINE HYDROCHLORIDE 20 MG/ML
INJECTION, SOLUTION EPIDURAL; INFILTRATION; INTRACAUDAL; PERINEURAL AS NEEDED
Status: DISCONTINUED | OUTPATIENT
Start: 2019-02-07 | End: 2019-02-07 | Stop reason: HOSPADM

## 2019-02-07 RX ORDER — HYDROCODONE BITARTRATE AND ACETAMINOPHEN 5; 325 MG/1; MG/1
1 TABLET ORAL
Qty: 20 TAB | Refills: 0 | Status: SHIPPED | OUTPATIENT
Start: 2019-02-07 | End: 2019-03-01

## 2019-02-07 RX ORDER — ONDANSETRON 2 MG/ML
INJECTION INTRAMUSCULAR; INTRAVENOUS AS NEEDED
Status: DISCONTINUED | OUTPATIENT
Start: 2019-02-07 | End: 2019-02-07 | Stop reason: HOSPADM

## 2019-02-07 RX ORDER — FENTANYL CITRATE 50 UG/ML
25 INJECTION, SOLUTION INTRAMUSCULAR; INTRAVENOUS
Status: DISCONTINUED | OUTPATIENT
Start: 2019-02-07 | End: 2019-02-07 | Stop reason: HOSPADM

## 2019-02-07 RX ORDER — LIDOCAINE HYDROCHLORIDE 10 MG/ML
0.1 INJECTION, SOLUTION EPIDURAL; INFILTRATION; INTRACAUDAL; PERINEURAL AS NEEDED
Status: DISCONTINUED | OUTPATIENT
Start: 2019-02-07 | End: 2019-02-07 | Stop reason: HOSPADM

## 2019-02-07 RX ORDER — HYDROMORPHONE HYDROCHLORIDE 1 MG/ML
.2-.5 INJECTION, SOLUTION INTRAMUSCULAR; INTRAVENOUS; SUBCUTANEOUS ONCE
Status: DISCONTINUED | OUTPATIENT
Start: 2019-02-07 | End: 2019-02-07 | Stop reason: HOSPADM

## 2019-02-07 RX ORDER — MIDAZOLAM HYDROCHLORIDE 1 MG/ML
INJECTION, SOLUTION INTRAMUSCULAR; INTRAVENOUS AS NEEDED
Status: DISCONTINUED | OUTPATIENT
Start: 2019-02-07 | End: 2019-02-07 | Stop reason: HOSPADM

## 2019-02-07 RX ORDER — LIDOCAINE HYDROCHLORIDE AND EPINEPHRINE 20; 5 MG/ML; UG/ML
INJECTION, SOLUTION EPIDURAL; INFILTRATION; INTRACAUDAL; PERINEURAL AS NEEDED
Status: DISCONTINUED | OUTPATIENT
Start: 2019-02-07 | End: 2019-02-07 | Stop reason: HOSPADM

## 2019-02-07 RX ORDER — PROPOFOL 10 MG/ML
INJECTION, EMULSION INTRAVENOUS
Status: DISCONTINUED | OUTPATIENT
Start: 2019-02-07 | End: 2019-02-07 | Stop reason: HOSPADM

## 2019-02-07 RX ORDER — SODIUM CHLORIDE, SODIUM LACTATE, POTASSIUM CHLORIDE, CALCIUM CHLORIDE 600; 310; 30; 20 MG/100ML; MG/100ML; MG/100ML; MG/100ML
25 INJECTION, SOLUTION INTRAVENOUS CONTINUOUS
Status: DISCONTINUED | OUTPATIENT
Start: 2019-02-07 | End: 2019-02-07 | Stop reason: HOSPADM

## 2019-02-07 RX ORDER — DEXAMETHASONE SODIUM PHOSPHATE 4 MG/ML
INJECTION, SOLUTION INTRA-ARTICULAR; INTRALESIONAL; INTRAMUSCULAR; INTRAVENOUS; SOFT TISSUE AS NEEDED
Status: DISCONTINUED | OUTPATIENT
Start: 2019-02-07 | End: 2019-02-07 | Stop reason: HOSPADM

## 2019-02-07 RX ORDER — ONDANSETRON 2 MG/ML
4 INJECTION INTRAMUSCULAR; INTRAVENOUS AS NEEDED
Status: DISCONTINUED | OUTPATIENT
Start: 2019-02-07 | End: 2019-02-07 | Stop reason: HOSPADM

## 2019-02-07 RX ORDER — CEFAZOLIN SODIUM/WATER 2 G/20 ML
2 SYRINGE (ML) INTRAVENOUS ONCE
Status: COMPLETED | OUTPATIENT
Start: 2019-02-07 | End: 2019-02-07

## 2019-02-07 RX ORDER — PROPOFOL 10 MG/ML
INJECTION, EMULSION INTRAVENOUS AS NEEDED
Status: DISCONTINUED | OUTPATIENT
Start: 2019-02-07 | End: 2019-02-07 | Stop reason: HOSPADM

## 2019-02-07 RX ORDER — SODIUM CHLORIDE 0.9 % (FLUSH) 0.9 %
5-40 SYRINGE (ML) INJECTION AS NEEDED
Status: DISCONTINUED | OUTPATIENT
Start: 2019-02-07 | End: 2019-02-07 | Stop reason: HOSPADM

## 2019-02-07 RX ORDER — OXYCODONE AND ACETAMINOPHEN 5; 325 MG/1; MG/1
1 TABLET ORAL
Status: DISCONTINUED | OUTPATIENT
Start: 2019-02-07 | End: 2019-02-07 | Stop reason: HOSPADM

## 2019-02-07 RX ORDER — FENTANYL CITRATE 50 UG/ML
INJECTION, SOLUTION INTRAMUSCULAR; INTRAVENOUS AS NEEDED
Status: DISCONTINUED | OUTPATIENT
Start: 2019-02-07 | End: 2019-02-07 | Stop reason: HOSPADM

## 2019-02-07 RX ADMIN — DEXAMETHASONE SODIUM PHOSPHATE 4 MG: 4 INJECTION, SOLUTION INTRA-ARTICULAR; INTRALESIONAL; INTRAMUSCULAR; INTRAVENOUS; SOFT TISSUE at 12:52

## 2019-02-07 RX ADMIN — LIDOCAINE HYDROCHLORIDE 50 MG: 20 INJECTION, SOLUTION EPIDURAL; INFILTRATION; INTRACAUDAL; PERINEURAL at 12:45

## 2019-02-07 RX ADMIN — PROPOFOL 100 MG: 10 INJECTION, EMULSION INTRAVENOUS at 12:46

## 2019-02-07 RX ADMIN — FENTANYL CITRATE 50 MCG: 50 INJECTION, SOLUTION INTRAMUSCULAR; INTRAVENOUS at 12:44

## 2019-02-07 RX ADMIN — ONDANSETRON 4 MG: 2 INJECTION INTRAMUSCULAR; INTRAVENOUS at 12:52

## 2019-02-07 RX ADMIN — MIDAZOLAM HYDROCHLORIDE 2 MG: 1 INJECTION, SOLUTION INTRAMUSCULAR; INTRAVENOUS at 12:38

## 2019-02-07 RX ADMIN — FENTANYL CITRATE 25 MCG: 50 INJECTION, SOLUTION INTRAMUSCULAR; INTRAVENOUS at 13:32

## 2019-02-07 RX ADMIN — SODIUM CHLORIDE, SODIUM LACTATE, POTASSIUM CHLORIDE, AND CALCIUM CHLORIDE 25 ML/HR: 600; 310; 30; 20 INJECTION, SOLUTION INTRAVENOUS at 10:25

## 2019-02-07 RX ADMIN — PROPOFOL 100 MCG/KG/MIN: 10 INJECTION, EMULSION INTRAVENOUS at 12:50

## 2019-02-07 RX ADMIN — MIDAZOLAM HYDROCHLORIDE 2 MG: 1 INJECTION, SOLUTION INTRAMUSCULAR; INTRAVENOUS at 12:41

## 2019-02-07 RX ADMIN — Medication 2 G: at 12:38

## 2019-02-07 NOTE — PERIOP NOTES
Permission received to review discharge instructions and discuss private health information with kimmy Rose. kimmy Rose will be staying with pt for 24 hours.

## 2019-02-07 NOTE — ANESTHESIA PREPROCEDURE EVALUATION
Anesthetic History   No history of anesthetic complications            Review of Systems / Medical History  Patient summary reviewed, nursing notes reviewed and pertinent labs reviewed    Pulmonary          Smoker (1.5 ppd)         Neuro/Psych         Psychiatric history ( PTSD, anxiety, panic attacks)     Cardiovascular    Hypertension                Comments: EF 55-60%   GI/Hepatic/Renal     GERD: well controlled      PUD     Endo/Other    Diabetes    Morbid obesity and arthritis     Other Findings   Comments: Fibromyalgia  Chronic pain/ wears fentanyl patch (not on today)         Physical Exam    Airway  Mallampati: II  TM Distance: 4 - 6 cm  Neck ROM: decreased range of motion   Mouth opening: Normal     Cardiovascular    Rhythm: regular  Rate: normal      Pertinent negatives: No murmur   Dental    Dentition: Upper partial plate, Poor dentition and Bridges     Pulmonary  Breath sounds clear to auscultation               Abdominal  GI exam deferred       Other Findings            Anesthetic Plan    ASA: 3  Anesthesia type: MAC          Induction: Intravenous  Anesthetic plan and risks discussed with: Patient      Pt took insulin for hyperglycemia this am.  Glucose 219 at 1212 pm

## 2019-02-07 NOTE — BRIEF OP NOTE
BRIEF OPERATIVE NOTE    Date of Procedure: 2/7/2019   Preoperative Diagnosis: LEFT MIDDLE FINGER TRIGGER FINGER  Postoperative Diagnosis: LEFT MIDDLE FINGER TRIGGER FINGER    Procedure(s):  RELEASE LEFT MIDDLE FINGER TRIGGER FINGER  Surgeon(s) and Role:     Katherine Rubi MD - Primary         Surgical Assistant: none    Surgical Staff:  Circ-1: Clara Montalvo RN  Scrub Tech-1: Dayanna Vasquez   Event Time In Time Out   Incision Start 1254    Incision Close 1311      Anesthesia: MAC   Estimated Blood Loss: min  Specimens: * No specimens in log *   Findings: tf   Complications: none  Implants: * No implants in log *

## 2019-02-07 NOTE — DISCHARGE INSTRUCTIONS
Discharge Instructions for:    Nessa Carter / [id number] : 1970    Admitted 2019 Discharged: 2019       Postoperative Hand Surgery Instructions      Elevation:  Elevation is one of the most important things to do following your surgery. Not only does it decrease swelling, but it also decreases pain. For hand or wrist surgery, keep the arm in your sling while up and about, with your hand above your elbow. When lying down, keep your arm propped up on a few pillows, so that your fingers are pointing towards the ceiling. Finger Motion:  **It is very important that you begin moving your fingers immediately after surgery, as often as you can, in order to prevent stiffness. Wiggling your fingers is not the same as moving them - moving your fingers involves bending them until they touch the dressing   (if possible). You should use your other hand to gently move the fingers on the operative hand if necessary. Dressings:  Please leave the surgical dressing in place until you are seen in the office for your first post-operative appointment with Dr Keven Molina. The dressing helps to prevent infection and positions the extremity to protect the surgical procedure  that was performed. Bathing and showering are allowed as long as the dressing is kept dry. To keep your dressing dry while bathing, simply place a plastic bag (bread bag, newspaper bag, trash bag or subway sandwich bag) over the dressing and seal it with tape or a rubber band. Medications: You have likely been given a prescription for pain medication. Please follow the instructions closely. It is safe to take up to 600mg of Ibuprofen (Advil or Motrin) along with the pain prescription as long you are not allergic to it, are not on blood thinners, and do not have gastric reflux or an ulcer. However, do not take any additional tylenol/acetaminophen if your prescription contains tylenol.       Note that my policy is to give only one prescription for pain medication at the time of the surgery - if you use it up quickly, then you will have no more pain medication left after only a few days, and I will not give you another prescription. So use your pain medication sparingly, and only when necessary! If you have new or increasing numbness after any numbing medicine wears off (12-24 hours for regional blocks, 3 hours for local), call the office immediately. If you experience nausea, vomiting or itching with the pain medication, please call the office during regular office hours. Refills for pain medication prescriptions ARE NOT given on the weekend or after regular office hours. If antibiotics have been prescribed, please be sure to complete the entire prescription. Post-Operative Appointments:  Dr. Farshad Larios likes to see you back in the office about 10-14 days following your surgery to change the post-operative dressing and remove any necessary sutures or staples. If you have not received a follow up appointment card please contact our office at [phone]. *If you have any questions or concerns or experience any sudden changes in your condition, please call our office at [phone]*    DO NOT TAKE TYLENOL/ACETAMINOPHEN WITH PERCOCET, [address], [address] TAKE NARCOTIC PAIN MEDICATIONS WITH FOOD     Narcotics tend to be constipating, we suggest taking a stool softener such as Colace or Miralax (follow package instructions). DO NOT DRIVE WHILE TAKING NARCOTIC PAIN MEDICATIONS. DO NOT TAKE SLEEPING MEDICATIONS OR ANTIANXIETY MEDICATIONS WHILE TAKING NARCOTIC PAIN MEDICATIONS,  ESPECIALLY THE NIGHT OF ANESTHESIA! CPAP PATIENTS BE SURE TO WEAR MACHINE WHENEVER NAPPING OR SLEEPING! TO PREVENT AN INFECTION      1. [address] YOUR HANDS     To prevent infection, good handwashing is the most important thing you or your caregiver can do.        Wash your hands with soap and water or use the hand  we gave you before you touch any wounds. 2. SHOWER     Use the antibacterial soap we gave you when you take a shower.  Shower with this soap until your wounds are healed.  To reach all areas of your body, you may need someone to help you.  Dont forget to clean your belly button with every shower. 3.  USE CLEAN SHEETS     Use freshly cleaned sheets on your bed after surgery.  To keep the surgery site clean, do not allow pets to sleep with you while your wound is still healing. 4. STOP SMOKING     Stop smoking, or at least cut back on smoking     Smoking slows your healing. 5.  CONTROL YOUR BLOOD SUGAR     High blood sugars slow wound healing.  If you are diabetic, control your blood sugar levels before and after your surgery. DISCHARGE SUMMARY from Nurse    The following personal items collected during your admission are returned to you:   Dental Appliance: Dental Appliances: Uppers, At home  Vision: Visual Aid: None  Hearing Aid:    Jewelry: Jewelry: None  Clothing: Clothing: With patient  Other Valuables: Other Valuables: None  Valuables sent to safe:        PATIENT INSTRUCTIONS:    After General Anesthesia or Intravenous Sedation, for 24 hours or while taking prescription Narcotics:        Someone should be with you for the next 24 hours. For your own safety, a responsible adult must drive you home. · Limit your activities  · Recommended activity: Rest today, up with assistance today. Do not climb stairs or shower unattended for the next 24 hours. · Please start with a soft bland diet and advance as tolerated (no nausea) to regular diet. · If you have a sore throat you should try the following: fluids, warm salt water gargles, or throat lozenges. If it does not improve after several days please follow up with your primary physician.   · Do not drive and operate hazardous machinery  · Do not make important personal or business decisions  · Do  not drink alcoholic beverages  · If you have not urinated within 8 hours after discharge, please contact your surgeon on call. Report the following to your surgeon:  · Excessive pain, swelling, redness or odor of or around the surgical area  · Temperature over 100.5  · Nausea and vomiting lasting longer than 4 hours or if unable to take medications  · Any signs of decreased circulation or nerve impairment to extremity: change in color, persistent  numbness, tingling, coldness or increase pain      · You will receive a Post Operative Call from one of the Recovery Room Nurses on the day after your surgery to check on you. It is very important for us to know how you are recovering after your surgery. If you have an issue or need to speak with someone, please call your surgeon, do not wait for the post operative call. · You may receive an e-mail or letter in the mail from [place] regarding your experience with us in the Ambulatory Surgery Unit. Your feedback is valuable to us and we appreciate your participation in the survey. · If the above instructions are not adequate or you are having problems after your surgery, call the physician at their office number. · We wish you a speedy recovery ? What to do at Home:      *  Please give a list of your current medications to your Primary Care Provider. *  Please update this list whenever your medications are discontinued, doses are      changed, or new medications (including over-the-counter products) are added. *  Please carry medication information at all times in case of emergency situations. These are general instructions for a healthy lifestyle:    No smoking/ No tobacco products/ Avoid exposure to second hand smoke    Surgeon General's Warning:  Quitting smoking now greatly reduces serious risk to your health.     Obesity, smoking, and sedentary lifestyle greatly increases your risk for illness    A healthy diet, regular physical exercise & weight monitoring are important for maintaining a healthy lifestyle    You may be retaining fluid if you have a history of heart failure or if you experience any of the following symptoms:  Weight gain of 3 pounds or more overnight or 5 pounds in a week, increased swelling in our hands or feet or shortness of breath while lying flat in bed. Please call your doctor as soon as you notice any of these symptoms; do not wait until your next office visit. Recognize signs and symptoms of STROKE:    B - Balance  E - Eyes    F-  Face looks uneven  A-  Arms unable to move or move even  S-  Speech slurred or non-existent  T-  Time-call 911 as soon as signs and symptoms begin-DO NOT go       Back to bed or wait to see if you get better-TIME IS BRAIN. If you have not received your influenza and/or pneumococcal vaccine, please follow up with your primary care physician. The discharge information has been reviewed with the patient and caregiver. The patient and caregiver verbalized understanding.

## 2019-02-07 NOTE — PERIOP NOTES
1322 Pt arrived to PACU. Pt c/o back pain and wrist pain. 4/10. Dressing on left wrist CDI. Left fingers warm and pink. 1334 Fentanyl given. 1335 Reviewing discharge instructions with boyfriend. 1345 Pain 3/10. VSS. Sling placed on left arm. Pt meets discharge criteria.

## 2019-02-07 NOTE — ANESTHESIA POSTPROCEDURE EVALUATION
Procedure(s):  RELEASE LEFT MIDDLE FINGER TRIGGER FINGER.     Anesthesia Post Evaluation      Multimodal analgesia: multimodal analgesia used between 6 hours prior to anesthesia start to PACU discharge  Patient location during evaluation: bedside  Patient participation: complete - patient participated  Level of consciousness: awake and alert  Pain score: 3  Airway patency: patent  Anesthetic complications: no  Cardiovascular status: acceptable  Respiratory status: acceptable  Hydration status: acceptable  Post anesthesia nausea and vomiting:  none      Visit Vitals  /67   Pulse 80   Temp 36.6 °C (97.8 °F)   Resp 15   Ht 5' 4\" (1.626 m)   Wt 93.9 kg (207 lb)   SpO2 99%   BMI 35.53 kg/m²

## 2019-02-07 NOTE — OP NOTES
OPERATIVE REPORT       PREOPERATIVE DIAGNOSIS: Left middle finger finger trigger finger  POSTOPERATIVE DIAGNOSIS: Left middle fingerfinger trigger finger  OPERATIVE PROCEDURE: Release Left middle finger trigger finger  SURGEON: Jos Candelaria MD.   ANESTHESIA: Local MAC anesthesia. ESTIMATED BLOOD LOSS: less than 5 ccl. SPECIMENS REMOVED: None. COMPLICATIONS: None. IMPLANTS: None. DESCRIPTION OF PROCEDURE: The patient was brought into the operating room,   placed in supine position and sedation administered. The Left upper   extremity was prepped and draped in the usual manner. After time-out,   approximately 9 ccs of 2% lidocaine with epinephrine were injected in   line with the proposed incision. A longitudinal incision was made in the distal palm, proximal to the appropriate finger. Subcutaneous tissues were carefully divided and the A1 pulley located and divided under direct visualization. The tendons then moved freely   with  flexion and extension of the digit, with no signs of triggering. The wound was   thoroughly irrigated. Closure was performed with 4-0 nylon suture. Xeroform, 4 x 4's, Kerlix and Coban were used as dressing. The patient tolerated the   procedure well, was taken back to the PACU in stable condition.      Lin Guido MD   2/7/2019

## 2019-02-11 ENCOUNTER — OFFICE VISIT (OUTPATIENT)
Dept: ENDOCRINOLOGY | Age: 49
End: 2019-02-11

## 2019-02-11 VITALS
HEART RATE: 92 BPM | DIASTOLIC BLOOD PRESSURE: 80 MMHG | HEIGHT: 64 IN | WEIGHT: 213 LBS | BODY MASS INDEX: 36.37 KG/M2 | SYSTOLIC BLOOD PRESSURE: 133 MMHG

## 2019-02-11 DIAGNOSIS — R79.89 ELEVATED LFTS: ICD-10-CM

## 2019-02-11 DIAGNOSIS — I10 ESSENTIAL HYPERTENSION: ICD-10-CM

## 2019-02-11 DIAGNOSIS — E78.5 HYPERLIPIDEMIA LDL GOAL <100: ICD-10-CM

## 2019-02-11 DIAGNOSIS — R94.6 BORDERLINE ABNORMAL THYROID FUNCTION TEST: ICD-10-CM

## 2019-02-11 RX ORDER — METOCLOPRAMIDE 5 MG/1
TABLET ORAL
COMMUNITY
Start: 2019-02-08 | End: 2019-05-21

## 2019-02-11 RX ORDER — INSULIN GLARGINE 100 [IU]/ML
INJECTION, SOLUTION SUBCUTANEOUS
Qty: 1 PEN | Refills: 0 | Status: SHIPPED | COMMUNITY
Start: 2019-02-11 | End: 2019-05-21

## 2019-02-11 NOTE — PROGRESS NOTES
Chief Complaint   Patient presents with    Diabetes     History of Present Illness: Rose Arce is a 53 y.o. female here for follow up of diabetes. Weight up 20 lbs since last visit in 10/18. She feels a lot of this may be from water weight and has only been taking one of the 20 mg lasix tabs daily but this morning decided to take 2 tabs daily and has already urinated a fair bit this morning including during our office visit. She was seen by Dr. Reshma Ellington on 1/25/19 and was put back on coreg 12.5 mg daily in addition to her losartan as apparently she had come off the coreg some time since last visit. It appears he quintin her TSH and it was slightly high but it doesn't appear this was addressed. From review of her chart, she has had 2 other slightly elevated TSH values over the past few years as listed below. She does have some constipation and fatigue and hair loss and dry skin and cold intolerance along with difficulty managing her weight. She continues to use the freestyle ellen and this is allowing her the ability to better control her blood sugars in addition to checking her sugars 8 times per day over the past 90 days. She is currently taking lantus 32 units daily and novolog for carbs and correction as below. Has many good readings in the 100-180 range but still has many in the 200-400s that are keeping her A1c up. Does have some hypoglycemia down to the 50s at times. She has been in touch with [facility] medical about trying to get back on the Med[employer] pump and I'm happy to support her in this decision. she has the following indications to continue treatment with freestyle ellen:  1) she has type 1 diabetes and is on an intensive insulin regimen with 4 injections of insulin per day for the past 180 days (addended 2/26/19).   2) she tests her blood sugar up to 8 times per day and makes treatment decisions off her blood sugar readings and her sensor readings  3) she requires adjustments to her insulin doses settings based on her sensor readings  4) she has benefitted from therapeutic continuous glucose monitoring and I recommend that she continue this  5) she is seen in my office every 3 months    Current Outpatient Medications   Medication Sig    metoclopramide HCl (REGLAN) 5 mg tablet     HYDROcodone-acetaminophen (NORCO) 5-325 mg per tablet Take 1 Tab by mouth every four (4) hours as needed for Pain. Max Daily Amount: 6 Tabs.  losartan (COZAAR) 25 mg tablet Take 1 Tab by mouth daily.  carvedilol (COREG) 12.5 mg tablet Take 1 tab by mouth daily    esomeprazole (NEXIUM) 40 mg capsule TAKE ONE CAPSULE BY MOUTH DAILY    insulin glargine (LANTUS SOLOSTAR U-100 INSULIN) 100 unit/mL (3 mL) inpn INJECT 30 UNITS BEFORE BREAKFAST OR AS DIRECTED UP TO 50 UNITS PER DAY    Insulin Needles, Disposable, (BD ULTRA-FINE MINI PEN NEEDLE) 31 gauge x 3/16\" ndle Use as directed up to 8 times daily    gabapentin (NEURONTIN) 300 mg capsule TAKE TWO CAPSULES BY MOUTH THREE TIMES A DAY    atorvastatin (LIPITOR) 40 mg tablet Take 1 Tab by mouth daily.  ONETOUCH ULTRA BLUE TEST STRIP strip USE AS DIRECTED TO TEST UP TO 8 TIMES PER DAY    levonorgestrel (MIRENA) 20 mcg/24 hr (5 years) IUD 1 Device by IntraUTERine route once.  furosemide (LASIX) 20 mg tablet Take 20 mg by mouth daily.  ARIPiprazole (ABILIFY) 30 mg tablet Take 30 mg by mouth daily.  LORazepam (ATIVAN) 1 mg tablet Take 1 mg by mouth three (3) times daily.  fentaNYL (DURAGESIC) 100 mcg/hr PATCH 1 Patch by TransDERmal route every seventy-two (72) hours.  FREESTYLE MARIELA SENSOR kit Use 1 sensor every 10 days as directed    HYDROcodone-acetaminophen (NORCO)  mg tablet Take 1 Tab by mouth every eight (8) hours as needed.     glucagon (GLUCAGON EMERGENCY KIT, HUMAN,) 1 mg injection Use as directed    ONE TOUCH DELICA 33 gauge misc Test 8 times daily--DX E10.65    venlafaxine-SR (EFFEXOR XR) 150 mg capsule Take 150 mg by mouth two (2) times a day.  insulin lispro (HUMALOG KWIKPEN) 100 unit/mL kwikpen 1:15 for carbs and 1:30 > 150 for correction during the day and > 180 at bedtime. Max 50 units per day    varenicline (CHANTIX STARTER DELILAH) 0.5 mg (11)- 1 mg (42) DsPk Take as instructed    varenicline (CHANTIX) 1 mg tablet Take 1 Tab by mouth two (2) times a day.  guaiFENesin ER (MUCINEX) 1,200 mg Ta12 ER tablet Take 1 Tab by mouth two (2) times a day.  albuterol (PROVENTIL HFA, VENTOLIN HFA, PROAIR HFA) 90 mcg/actuation inhaler Take 1 Puff by inhalation every four (4) hours as needed for Wheezing.  nystatin (MYCOSTATIN) 100,000 unit/mL suspension Take 5 mL by mouth four (4) times daily. swish and spit    acetaminophen (TYLENOL) 325 mg tablet Take 2 Tabs by mouth every six (6) hours as needed for Fever. No current facility-administered medications for this visit. Allergies   Allergen Reactions    Zocor [Simvastatin] Myalgia    Lisinopril Cough     Review of Systems:  - Eyes: no blurry vision or double vision  - Cardiovascular: no chest pain  - Respiratory: no shortness of breath  - Musculoskeletal: no myalgias  - Neurological: no numbness/tingling in extremities    Physical Examination:  Blood pressure 133/80, pulse 92, height 5' 4\" (1.626 m), weight 213 lb (96.6 kg).   - General: pleasant, no distress, good eye contact   - Neck: no carotid bruits  - Cardiovascular: regular, normal rate, nl s1 and s2, no m/r/g,   - Respiratory: clear bilaterally  - Integumentary: 1+ edema,   - Psychiatric: normal mood and affect    Data Reviewed:   Component      Latest Ref Rng & Units 1/25/2019 1/25/2019 1/25/2019 1/25/2019           9:21 AM  9:21 AM  9:21 AM  9:21 AM   Glucose      65 - 99 mg/dL 123 (H)      BUN      6 - 24 mg/dL 28 (H)      Creatinine      0.57 - 1.00 mg/dL 1.15 (H)      GFR est non-AA      >59 mL/min/1.73 57 (L)      GFR est AA      >59 mL/min/1.73 65      BUN/Creatinine ratio      9 - 23 24 (H)      Sodium      134 - 144 mmol/L 136      Potassium      3.5 - 5.2 mmol/L 4.8      Chloride      96 - 106 mmol/L 95 (L)      CO2      20 - 29 mmol/L 21      Calcium      8.7 - 10.2 mg/dL 9.4      Protein, total      6.0 - 8.5 g/dL 6.8      Albumin      3.5 - 5.5 g/dL 4.6      GLOBULIN, TOTAL      1.5 - 4.5 g/dL 2.2      A-G Ratio      1.2 - 2.2 2.1      Bilirubin, total      0.0 - 1.2 mg/dL 0.2      Alk. phosphatase      39 - 117 IU/L 105      AST      0 - 40 IU/L 25      ALT (SGPT)      0 - 32 IU/L 16      Cholesterol, total      100 - 199 mg/dL   155    Triglyceride      0 - 149 mg/dL   199 (H)    HDL Cholesterol      >39 mg/dL   45    VLDL, calculated      5 - 40 mg/dL   40    LDL, calculated      0 - 99 mg/dL   70    Hemoglobin A1c, (calculated)      4.8 - 5.6 %    10.0 (H)   Estimated average glucose      mg/dL    240   TSH      0.450 - 4.500 uIU/mL  5.450 (H)       Component      Latest Ref Rng & Units 11/23/2017 11/8/2012 12/1/2011 12/1/2011           6:45 AM  3:20 AM  2:07 PM  2:07 PM   T4, Free      0.82 - 1.77 ng/dL    1.00   TSH      0.450 - 4.500 uIU/mL 4.75 (H) 4.01 (H) 2.110        Assessment/Plan:     1) 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): Most recent Hgb A1c was 10% in 1/19 up from 9.2% in 10/18 up from 9% in 7/18 up from 8.2% in 4/18 down from 8.8% in 11/17 up from 7.4% in 9/17 down from 9.4% in 2/17 up from 9.1% in 11/16 down from 10% in 3/16 up from 9.4% in 12/15 up from 9.2% in 9/15 down from 9.7% in 5/15 down from 10.3% in 1/15 up from 8.9% in 10/14 down from 11.1% in 4/14 up from 9.2% in 1/14 up from 8.6% in March 2013 down from 8.9% in November down from 9.7% in Aug 2012 up from 9.2% in October 2011 up from 8.7% in May down from 9.7% in March up from 9.1% in July 2010 down from 10.2% in January down from 13.7% in October 2009. Her blood sugars are still high due to fluctuation in her sugars.   Hopefully with getting back on a pump, we'll be able to improve her control by fine tuning her basal rates. She has benefited from the freestyle [place] and I recommend she continue this. - cont lantus 32 units in the morning   - cont humalog/novolog 1:15 for carbs and take 1:30 for correction > 150 during the day and > 180 at night  - check bs up to 8 times daily due to fluctuating blood sugars  - cont freestyle ellen CGM  - foot exam done 10/18  - microalbumin was 69 in 7/10 up to 392 in March 2011 and 621 in May down to 216 in October 2011 and 37.3 in 4/14 and up to 183 in 12/15 and 300 in 9/16, down to 85 in 4/18  - optho UTD 11/17  - check Hgb A1c, cmp, and microalbumin at next visit  - check fasting bmp and c-peptide tomorrow per Medicare guidelines      2) HTN NOS (401.9): Blood pressure was at goal < 140/90. She is currently back on losartan per Dr. Viola Pérez  - cont coreg 12.5 mg daily  - cont losartan 25 mg daily  - cont lasix 20 mg daily per Dr. Viola Pérez      3) Hyperlipidemia (272.4): Given DM, goal LDL is < 100, non-HDL < 130, and TGs < 150. LDL was 146 in January 2010, down to 124 in July and 93 in March 2011 with taking 5 mg of crestor daily. Her crestor was changed to pravastatin by Dr. Marcello Warner because of cost in Feb 2012. LDL 93 in 8/12 but her HDL was high at 138 due to ETOH intake. LDL down to 39 in 11/12 but up to 102 in 3/13. Off pravastatin at this time and previously was on 10 mg daily.  in 1/14. Up to 167 in 4/14 so started lovastatin 20 mg daily but she couldn't afford this. She has been started on crestor 40 mg daily by crossover clinic and LDL 64 in 10/14 and 55 in 1/15. Was 37 in 3/16 so dose decreased to 20 mg daily and LDL 23 in 2/17. Was changed to lipitor 1/2 of 80 mg daily when clinic couldn't get crestor any longer and LDL 37 in 9/17. This was stopped during her hospital stay in 11/17 but was restarted by Dr. Katheryn Beltran in 12/17 and 42 in 4/18. Up to 94 in 8/18.  Down to 70 in 1/19  - cont lipitor 40 mg daily  - check lipids prior to next visit      4) Elevated LFTs (790.6): I told her previously that her LFTs were elevated likely due to ETOH intake so I advised her to cut back on this and her repeat LFTs were normal in 3/13 and 1/14 and 4/14 and 10/14 and 1/15 and 12/15 and 3/16. AST up to 76 in 2/17 but back to normal in 5/17 and 11/17 and 8/18 and 1/19 so will follow this. 5) Borderline abnormal TFT: TSH 2.1 in 12/11 but up to 4.01 in 11/12 and 4.75 in 11/17 during 2 hospital stays. Most recently up to 5.45 in 1/19 with Dr. Alina Miranda. Does have some mild hypothyroid symptoms so will repeat her labs tomorrow along with thyroid antibody levels to see if she would benefit from starting levothyroxine. - check TSH, Free T4 and thyroid ab level tomorrow    We spent 40 minutes of face to face time together and > 50% of the time was spent in counseling regarding management of all the conditions above. Patient Instructions   1) For questions with the [employer], call Soham N[place]am [place], his number is [phone] ext [phone]   If that does not work try [phone].     2) I will have you get your c-peptide and fasting glucose drawn tomorrow and when I get the results back, I can complete the rest of the pump paperwork to send to [facility]. 3) I will send you a message through [employer] with your lab results. 4) TSH is a thyroid test.  Your level is 5.4 which is high and above goal of 0.5-2.0. This test goes opposite of your thyroid function and suggests you could have mild hypothyroidism (slow metabolism) and therefore may benefit from starting a low dose of levothyroxine. I will repeat this level along with thyroid antibody levels and be in touch if we should start this. If we do start this,  take Levothyroxine either in the morning with just water, at least 30 minutes before breakfast and coffee and all other pills, or take it at bedtime on any empty stomach 2-3 hours after dinner. This must be  from vitamins, calcium, or iron by at least 4 hours.   If you ever do miss a dose of levothyroxine, it's okay to take 2 pills the next day to catch up on your dose. The goal is always to get 7 pills per week and even if you have to take 3-4 pills at a time to get caught up on missed doses, this is better than letting your weekly level fall.                   Follow-up Disposition:  Return for 5/21/19 at 9:30am.    Copy sent to:  Dr. Heydi Brown [place]  Dr. Arianna Dubon via [facility]  Dr. Maykel Cruz    Lab follow up: 2/23/19  Component      Latest Ref Rng & Units 2/18/2019 2/18/2019          11:34 AM 11:34 AM   Glucose      65 - 99 mg/dL 209 (H)    BUN      6 - 24 mg/dL 13    Creatinine      0.57 - 1.00 mg/dL 1.22 (H)    GFR est non-AA      >59 mL/min/1.73 53 (L)    GFR est AA      >59 mL/min/1.73 61    BUN/Creatinine ratio      9 - 23 11    Sodium      134 - 144 mmol/L 133 (L)    Potassium      3.5 - 5.2 mmol/L 4.5    Chloride      96 - 106 mmol/L 94 (L)    CO2      20 - 29 mmol/L 25    Calcium      8.7 - 10.2 mg/dL 9.3    C-Peptide      1.1 - 4.4 ng/mL  0.1 (L)     Component      Latest Ref Rng & Units 2/18/2019 2/18/2019 2/18/2019          11:34 AM 11:34 AM 11:34 AM   Thyroid peroxidase Ab      0 - 34 IU/mL 21     Thyroglobulin Ab      0.0 - 0.9 IU/mL <1.0     TSH      0.450 - 4.500 uIU/mL   1.240   T4, Free      0.82 - 1.77 ng/dL  0.95      Sent her the following message through [employer] (Chapter [employer]):  Your TSH (thyroid test) was back to normal at 1.24 and your thyroid antibody panel was normal so you don't appear to have hypothyroidism for sure and therefore I will hold on starting levothyroxine at this time and instead will follow your level over time to see if you need treatment in the future.  -------------------------------------------------------------------------------------------------------------------  Your c-peptide (marker of insulin production) was low at 0.1 as expected so I will send this value to [facility] medical to try to get you approved for the insulin pump.  -------------------------------------------------------------------------------------------------------------------  BUN and creatinine are markers of kidney function. Your creatinine was slightly higher at 1.22 than 1.15 one month ago but relatively stable.

## 2019-02-11 NOTE — PATIENT INSTRUCTIONS
1) For questions with the Medtr[employer], call Lakhwinder Cagle, his number is [phone] ext [phone]   If that does not work try [phone].     2) I will have you get your c-peptide and fasting glucose drawn tomorrow and when I get the results back, I can complete the rest of the pump paperwork to send to [facility]. 3) I will send you a message through [employer] with your lab results. 4) TSH is a thyroid test.  Your level is 5.4 which is high and above goal of 0.5-2.0. This test goes opposite of your thyroid function and suggests you could have mild hypothyroidism (slow metabolism) and therefore may benefit from starting a low dose of levothyroxine. I will repeat this level along with thyroid antibody levels and be in touch if we should start this. If we do start this,  take Levothyroxine either in the morning with just water, at least 30 minutes before breakfast and coffee and all other pills, or take it at bedtime on any empty stomach 2-3 hours after dinner. This must be  from vitamins, calcium, or iron by at least 4 hours. If you ever do miss a dose of levothyroxine, it's okay to take 2 pills the next day to catch up on your dose. The goal is always to get 7 pills per week and even if you have to take 3-4 pills at a time to get caught up on missed doses, this is better than letting your weekly level fall.

## 2019-02-15 ENCOUNTER — TELEPHONE (OUTPATIENT)
Dept: ENDOCRINOLOGY | Age: 49
End: 2019-02-15

## 2019-02-15 RX ORDER — CARVEDILOL 12.5 MG/1
TABLET ORAL
Qty: 30 TAB | Refills: 11
Start: 2019-02-15 | End: 2019-06-20

## 2019-02-15 NOTE — TELEPHONE ENCOUNTER
Please have her try decreasing the carvedilol (coreg) back to 1/2 tab daily to see if this keeps her blood pressure over 100 on the top number and 60 on the bottom number. I updated her med list but did not send a new prescription to her pharmacy on file that has been filling this med.

## 2019-02-15 NOTE — TELEPHONE ENCOUNTER
I spoke with patient and she stated she purchased a new bp monitor today and her bp today was 90/62. She stated she has had 16 oz of water today and 4 diet drinks. She took her bp meds his morning around 4 am. Should she be concern?

## 2019-02-19 LAB
BUN SERPL-MCNC: 13 MG/DL (ref 6–24)
BUN/CREAT SERPL: 11 (ref 9–23)
C PEPTIDE SERPL-MCNC: 0.1 NG/ML (ref 1.1–4.4)
CALCIUM SERPL-MCNC: 9.3 MG/DL (ref 8.7–10.2)
CHLORIDE SERPL-SCNC: 94 MMOL/L (ref 96–106)
CO2 SERPL-SCNC: 25 MMOL/L (ref 20–29)
CREAT SERPL-MCNC: 1.22 MG/DL (ref 0.57–1)
GLUCOSE SERPL-MCNC: 209 MG/DL (ref 65–99)
INTERPRETATION: NORMAL
POTASSIUM SERPL-SCNC: 4.5 MMOL/L (ref 3.5–5.2)
SODIUM SERPL-SCNC: 133 MMOL/L (ref 134–144)
T4 FREE SERPL-MCNC: 0.95 NG/DL (ref 0.82–1.77)
THYROGLOB AB SERPL-ACNC: <1 IU/ML (ref 0–0.9)
THYROPEROXIDASE AB SERPL-ACNC: 21 IU/ML (ref 0–34)
TSH SERPL DL<=0.005 MIU/L-ACNC: 1.24 UIU/ML (ref 0.45–4.5)

## 2019-02-21 ENCOUNTER — TELEPHONE (OUTPATIENT)
Dept: ENDOCRINOLOGY | Age: 49
End: 2019-02-21

## 2019-02-21 NOTE — TELEPHONE ENCOUNTER
[place] ring rep stopped by the office to see if you had signed the form to see if Ms. Beatrice Cardona  qualifies for the [employer]. He stated that he dropped it off two weeks ago and it only required a signature.

## 2019-02-21 NOTE — TELEPHONE ENCOUNTER
Please call him at [phone] to let him know that patient has decided to pursue the Medtronic pump and this was the reason that I didn't fill out the forms.

## 2019-02-24 ENCOUNTER — HOSPITAL ENCOUNTER (EMERGENCY)
Age: 49
Discharge: HOME | End: 2019-02-24
Attending: EMERGENCY MEDICINE | Admitting: EMERGENCY MEDICINE
Payer: MEDICARE

## 2019-02-24 VITALS
DIASTOLIC BLOOD PRESSURE: 81 MMHG | TEMPERATURE: 98.4 F | RESPIRATION RATE: 17 BRPM | WEIGHT: 214.73 LBS | HEIGHT: 64 IN | HEART RATE: 106 BPM | BODY MASS INDEX: 36.66 KG/M2 | SYSTOLIC BLOOD PRESSURE: 151 MMHG | OXYGEN SATURATION: 97 %

## 2019-02-24 DIAGNOSIS — E10.69 TYPE 1 DIABETES MELLITUS WITH OTHER SPECIFIED COMPLICATION (HCC): ICD-10-CM

## 2019-02-24 DIAGNOSIS — F32.A ANXIETY AND DEPRESSION: Chronic | ICD-10-CM

## 2019-02-24 DIAGNOSIS — S01.311A LACERATION OF RIGHT EAR LOBE, INITIAL ENCOUNTER: Primary | ICD-10-CM

## 2019-02-24 DIAGNOSIS — I10 ESSENTIAL HYPERTENSION: Chronic | ICD-10-CM

## 2019-02-24 DIAGNOSIS — F41.9 ANXIETY AND DEPRESSION: Chronic | ICD-10-CM

## 2019-02-24 DIAGNOSIS — S01.331A COMPLICATION OF RIGHT EAR PIERCING, INITIAL ENCOUNTER: ICD-10-CM

## 2019-02-24 DIAGNOSIS — Z78.9 UP TO DATE WITH TETANUS TOXOID IMMUNIZATION: ICD-10-CM

## 2019-02-24 PROCEDURE — 77030002888 HC SUT CHRMC J&J -A

## 2019-02-24 PROCEDURE — 77030018836 HC SOL IRR NACL ICUM -A

## 2019-02-24 PROCEDURE — 75810000293 HC SIMP/SUPERF WND  RPR

## 2019-02-24 PROCEDURE — 99282 EMERGENCY DEPT VISIT SF MDM: CPT

## 2019-02-24 RX ORDER — LIDOCAINE HYDROCHLORIDE 20 MG/ML
10 INJECTION, SOLUTION EPIDURAL; INFILTRATION; INTRACAUDAL; PERINEURAL
Status: DISCONTINUED | OUTPATIENT
Start: 2019-02-24 | End: 2019-02-24 | Stop reason: HOSPADM

## 2019-02-24 RX ORDER — BUPIVACAINE HYDROCHLORIDE 5 MG/ML
10 INJECTION, SOLUTION EPIDURAL; INTRACAUDAL
Status: DISCONTINUED | OUTPATIENT
Start: 2019-02-24 | End: 2019-02-24 | Stop reason: HOSPADM

## 2019-02-25 ENCOUNTER — TELEPHONE (OUTPATIENT)
Dept: INTERNAL MEDICINE CLINIC | Age: 49
End: 2019-02-25

## 2019-02-25 ENCOUNTER — TELEPHONE (OUTPATIENT)
Dept: ENDOCRINOLOGY | Age: 49
End: 2019-02-25

## 2019-02-25 NOTE — TELEPHONE ENCOUNTER
Spoke with patient after 2 patient identifiers being note and advised per Dr. Lady Briggs The ED did not think she needed any abx. Gave appt with Dr. Jay Bryant on Tuesday, February 26, 2019 11:15 AM, patient accepted  Patient expressed understanding and has no further questions at this time.

## 2019-02-25 NOTE — ED PROVIDER NOTES
EMERGENCY DEPARTMENT HISTORY AND PHYSICAL EXAM      Date: 2/24/2019  Patient Name: Clifford Aguayo    History of Presenting Illness     Chief Complaint   Patient presents with    Laceration     to right ear lobe       History Provided By: Patient    HPI: Clifford Aguayo, 53 y.o. female with PMHx significant for DM, anxiety, panic attacks, and chronic pain, presents ambulatory to the ED with cc of R earlobe tear after her dog got caught on her hoop earrings, and this tore through her lobe. Pt denies any bleeding, but does report 9/10 pain. She states that her ears were pierced \"forever ago when I was a kid. \" She is UTD with tetanus. She denies bleeding, dog bite, fever, chills. PCP: David Eugene,     There are no other complaints, changes, or physical findings at this time. Current Facility-Administered Medications   Medication Dose Route Frequency Provider Last Rate Last Dose    lidocaine (PF) (XYLOCAINE) 20 mg/mL (2 %) injection 200 mg  10 mL IntraDERMal NOW Rosemarie Singh PA        bupivacaine (PF) (MARCAINE) 0.5 % (5 mg/mL) injection 50 mg  10 mL SubCUTAneous NOW Rosemarie Singh, [place]         Current Outpatient Medications   Medication Sig Dispense Refill    carvedilol (COREG) 12.5 mg tablet Take 1/2 tab by mouth daily--Dose change 2/15/19--updated med list--did not send prescription to the pharmacy (Patient taking differently: 6.25 mg. Take 1/2 tab by mouth daily--Dose change 2/15/19--updated med list--did not send prescription to the pharmacy) 30 Tab 11    metoclopramide HCl (REGLAN) 5 mg tablet       insulin glargine (LANTUS SOLOSTAR U-100 INSULIN) 100 unit/mL (3 mL) inpn sample 1 Pen 0    HYDROcodone-acetaminophen (NORCO) 5-325 mg per tablet Take 1 Tab by mouth every four (4) hours as needed for Pain. Max Daily Amount: 6 Tabs.  20 Tab 0    varenicline (CHANTIX STARTER DELILAH) 0.5 mg (11)- 1 mg (42) DsPk Take as instructed 1 Dose Pack 0    varenicline (CHANTIX) 1 mg tablet Take 1 Tab by mouth two (2) times a day. 60 Tab 3    losartan (COZAAR) 25 mg tablet Take 1 Tab by mouth daily.  esomeprazole (NEXIUM) 40 mg capsule TAKE ONE CAPSULE BY MOUTH DAILY 30 Cap 8    insulin glargine (LANTUS SOLOSTAR U-100 INSULIN) 100 unit/mL (3 mL) inpn INJECT 30 UNITS BEFORE BREAKFAST OR AS DIRECTED UP TO 50 UNITS PER DAY 15 mL 11    guaiFENesin ER (MUCINEX) 1,200 mg Ta12 ER tablet Take 1 Tab by mouth two (2) times a day. 60 Tab 1    albuterol (PROVENTIL HFA, VENTOLIN HFA, PROAIR HFA) 90 mcg/actuation inhaler Take 1 Puff by inhalation every four (4) hours as needed for Wheezing. 1 Inhaler 11    Insulin Needles, Disposable, (BD ULTRA-FINE MINI PEN NEEDLE) 31 gauge x 3/16\" ndle Use as directed up to 8 times daily 200 Pen Needle 11    gabapentin (NEURONTIN) 300 mg capsule TAKE TWO CAPSULES BY MOUTH THREE TIMES A  Cap 11    atorvastatin (LIPITOR) 40 mg tablet Take 1 Tab by mouth daily. 90 Tab 3    ONETOUCH ULTRA BLUE TEST STRIP strip USE AS DIRECTED TO TEST UP TO 8 TIMES PER  Strip 11    nystatin (MYCOSTATIN) 100,000 unit/mL suspension Take 5 mL by mouth four (4) times daily. swish and spit 1500 mL 1    levonorgestrel (MIRENA) 20 mcg/24 hr (5 years) IUD 1 Device by IntraUTERine route once.  furosemide (LASIX) 20 mg tablet Take 20 mg by mouth daily.  ARIPiprazole (ABILIFY) 30 mg tablet Take 30 mg by mouth daily.  LORazepam (ATIVAN) 1 mg tablet Take 1 mg by mouth three (3) times daily.  fentaNYL (DURAGESIC) 100 mcg/hr PATCH 1 Patch by TransDERmal route every seventy-two (72) hours.  FREESTYLE MARIELA SENSOR kit Use 1 sensor every 10 days as directed 3 Each 11    HYDROcodone-acetaminophen (NORCO)  mg tablet Take 1 Tab by mouth every eight (8) hours as needed.  acetaminophen (TYLENOL) 325 mg tablet Take 2 Tabs by mouth every six (6) hours as needed for Fever.  30 Tab 0    glucagon (GLUCAGON EMERGENCY KIT, HUMAN,) 1 mg injection Use as directed 2 Kit 11    ONE TOUCH DELICA 33 gauge misc Test 8 times daily--DX E10.65 250 Lancet 11    venlafaxine-SR (EFFEXOR XR) 150 mg capsule Take 150 mg by mouth two (2) times a day.  insulin lispro (HUMALOG KWIKPEN) 100 unit/mL kwikpen 1:15 for carbs and 1:30 > 150 for correction during the day and > 180 at bedtime.   Max 50 units per day 1 Package        Past History     Past Medical History:  Past Medical History:   Diagnosis Date    Achilles tendon rupture     along with torn right patellar/femoral ligament    Arthritis     rt. foot    Chronic pain     abdominal pain    Diabetes (HCC)     IDDM on insulin pump and sensor    DM type 1 (diabetes mellitus, type 1) (HCC)     age 25    Endometriosis     s/p ex-lap 4x    Gastric ulcer     GERD (gastroesophageal reflux disease)     Herpes     Other and unspecified hyperlipidemia     Panic attacks     Psychiatric disorder     anxiety, panic attacks    PTSD (post-traumatic stress disorder)     Unspecified essential hypertension        Past Surgical History:  Past Surgical History:   Procedure Laterality Date    HX COLONOSCOPY      HX GYN      2 d&c's    HX GYN      laparoscopies x5 for endometriosis    HX GYN      mirena in place    HX ORTHOPAEDIC  2002    achiles tendon repair,talus repair    HX ORTHOPAEDIC      injections x2 to right shoulder    HX ORTHOPAEDIC Left 02/07/2019    3rd trigger finger release       Family History:  Family History   Problem Relation Age of Onset    Diabetes Mother         diet controlled    Diabetes Father         R foot amupation    Liver Disease Father     Heart Disease Paternal Uncle         MI in his 46s    Stroke Neg Hx        Social History:  Social History     Tobacco Use    Smoking status: Current Some Day Smoker     Packs/day: 2.00     Years: 17.00     Pack years: 34.00     Types: Cigarettes    Smokeless tobacco: Current User    Tobacco comment: Trying to quit   Substance Use Topics    Alcohol use: No     Comment: a beer every few months    Drug use: No       Allergies: Allergies   Allergen Reactions    Zocor [Simvastatin] Myalgia    Lisinopril Cough         Review of Systems   Review of Systems   Constitutional: Negative. Negative for activity change, appetite change, chills and fever. HENT: Positive for ear pain (R earlobe). Negative for nosebleeds. Respiratory: Negative for cough and shortness of breath. Cardiovascular: Negative for chest pain and palpitations. Gastrointestinal: Negative for nausea and vomiting. Musculoskeletal: Negative for arthralgias and myalgias. Skin: Negative for color change, rash and wound. Neurological: Negative for dizziness and headaches. All other systems reviewed and are negative. Physical Exam   Physical Exam   Constitutional: She is oriented to person, place, and time. She appears well-developed and well-nourished. No distress. 53 y.o.  female in NAD  Communicates appropriately and in full sentences   HENT:   Head: Normocephalic. Left Ear: External ear normal.   Ears:    Small piercing that is torn through to R lobule of ear. No active bleeding. No inner or middle ear trauma. No epistaxis. Eyes: Conjunctivae are normal. Pupils are equal, round, and reactive to light. Right eye exhibits no discharge. Left eye exhibits no discharge. Neck: Normal range of motion. Neck supple. No nuchal rigidity or meningeal signs   Pulmonary/Chest: Effort normal. No respiratory distress. Musculoskeletal: Normal range of motion. She exhibits no deformity. No neurologic, motor, vascular, or compartment embarrassment observed on exam. No focal neurologic deficits. Neurological: She is alert and oriented to person, place, and time. Skin: Skin is warm and dry. No rash noted. She is not diaphoretic. No erythema. Psychiatric: She has a normal mood and affect. Her behavior is normal.   Nursing note and vitals reviewed.         Diagnostic Study Results     No formal testing initiated. Medical Decision Making   I am the first provider for this patient. I reviewed the vital signs, available nursing notes, past medical history, past surgical history, family history and social history. Vital Signs-Reviewed the patient's vital signs. Patient Vitals for the past 12 hrs:   Temp Pulse Resp BP SpO2   02/24/19 1902 98.4 °F (36.9 °C) (!) 106 17 151/81 97 %         Records Reviewed: Nursing Notes and Old Medical Records    Provider Notes (Medical Decision Making):   DDx: laceration, auricle laceration, tetanus update    Pt requested pain medication numerous times. Referred to [facility] and she has a >800 overdose risk score. Will not Rx narcotics. Explained that NSAIDs should suffice for her pain. Urged to follow-up with plastic surgery. ED Course:   Initial assessment performed. The patients presenting problems have been discussed, and they are in agreement with the care plan formulated and outlined with them. I have encouraged them to ask questions as they arise throughout their visit. Progress Note:  7:45 PM  Discussed laceration closure with patient. Offered her plastic surgery referral and follow-up, but she declined and would rather have her wound repaired right now. Will continue to monitor. Procedure Note - Laceration Repair:  Procedure by Ginna Cesar PA-C . Complexity: complex  1.5cm linear laceration to ear  was irrigated copiously with NS under jet lavage, prepped with Chlorprep and draped in a sterile fashion. The area was anesthetized with 2 mLs of  Lidocaine 2% without epinephrine and Marcaine (50/50 mixture)via local infiltration. The wound was explored with the following results: No foreign bodies found. The wound was repaired with One layer suture closure: Skin Layer:  4 sutures placed, stitch type:simple interrupted, suture: 5-0 chromic gut. .  The wound was closed with good hemostasis and approximation.   Sterile dressing applied. Estimated blood loss: none  The procedure took 16-30 minutes, and pt tolerated well. DISCHARGE NOTE:  Karen Brand  results have been reviewed with her. She has been counseled regarding her diagnosis. She verbally conveys understanding and agreement of the signs, symptoms, diagnosis, treatment and prognosis and additionally agrees to follow up as recommended with Dr. Rannie Boeck, DO in 24 - 48 hours. She also agrees with the care-plan and conveys that all of her questions have been answered. I have also put together some discharge instructions for her that include: 1) educational information regarding their diagnosis, 2) how to care for their diagnosis at home, as well a 3) list of reasons why they would want to return to the ED prior to their follow-up appointment, should their condition change. She and/or family's questions have been answered. I have encouraged them to see the official results in [facility]\" or to retrieve the specifics of their results from medical records. PLAN:  1. Return precautions as discussed  2. Follow-up with providers as directed  3. Medications as prescribed    Return to ED if worse     Diagnosis     Clinical Impression:   1. Laceration of right ear lobe, initial encounter    2. Up to date with tetanus toxoid immunization    3. Complication of right ear piercing, initial encounter    4. Essential hypertension    5. Type 1 diabetes mellitus with other specified complication ([facility] [address]    6.  Anxiety and depression        Discharge Medication List as of 2/24/2019  8:09 PM          Follow-up Information     Follow up With Specialties Details Why Silvio Pride DO Internal Medicine Schedule an appointment as soon as possible for a visit in 2 days As needed, If symptoms worsen [address] Surgery, [facility]  Call today  [address]  [address] [facility]              This note will not be viewable in [address]

## 2019-02-25 NOTE — TELEPHONE ENCOUNTER
#[phone]  Pt states she was in ED last night and got 8 stiches in earlobe. She needs an antibiotic as she is diabetic. Her dog ripped her earring right out of her ear. Please call into [facility] on file. Pt asking for a call when the script has been called in so she can go . Thanks.

## 2019-02-25 NOTE — DISCHARGE INSTRUCTIONS
Patient Education   Patient Education     Laceration: After Your Visit to the Emergency Room  Your Care Instructions  A laceration, or cut, is an open wound through the skin. The doctor has cleaned your wound. The care you need depends on the type of cut or wound you have. The doctor may have used stitches, staples, tape (Steri-strips), or skin glue to close the wound. This will stop the bleeding, help the wound heal, and reduce scarring. Take good care of your wound at home to help it heal quickly and reduce your chance of infection. While your wound is healing, avoid any activity that could cause your wound to reopen. Even though you have been released from the emergency room, you still need to watch for any problems. The doctor carefully checked you. But sometimes problems can develop later. If you have new symptoms, or if your symptoms do not get better, return to the emergency room or call your doctor right away. A visit to the emergency room is only one step in your treatment. Even if you feel better, you still need to do what your doctor recommends, such as going to all suggested follow-up appointments and taking medicines exactly as directed. This will help you recover and help prevent future problems. How can you care for yourself at home? · Keep the wound dry for the first 48 hours or as your doctor tells you. · Clean the area with soap and water 2 times a day unless your doctor gives you different instructions. Don't use hydrogen peroxide or alcohol, which can slow healing. ¨ You may cover the wound with a thin layer of antibiotic ointment, such as bacitracin, and a nonstick bandage. ¨ Apply more ointment and replace the bandage as needed. · If your doctor prescribed antibiotics, take them as directed. Do not stop taking them just because you feel better. You need to take the full course of antibiotics. · Take pain medicines exactly as directed.   ¨ If the doctor gave you a prescription medicine for pain, take it as prescribed. ¨ If you are not taking a prescription pain medicine, ask your doctor if you can take an over-the-counter medicine. · Some pain is normal with a wound, but do not ignore pain that is getting worse instead of better. You could have an infection. · Your doctor may have closed your wound with stitches (sutures), staples, Steri-strips, or skin glue. ¨ If you have stitches, your doctor may remove them after several days to 2 weeks. ¨ If you have Steri-strips, leave them on for a week, or until they loosen and come off on their own. ¨ If you have staples, your doctor may remove them after 7 to 14 days. ¨ If your wound was closed with skin glue, the glue will wear off in a few days to 2 weeks. When should you call for help? Return to the emergency room now if:  · You have signs of infection, such as:  ¨ Increased pain, swelling, warmth, or redness around the wound. ¨ Red streaks leading from the wound. ¨ Pus draining from the wound. ¨ Swollen lymph nodes in your neck, armpits, or groin. ¨ A fever. · The wound opens or starts to bleed, and blood soaks through the bandage. A small amount of blood is normal.  Call your doctor today if:  · The wound has not been getting better or looks worse. Where can you learn more? Go to truedash.be  Enter R096 in the search box to learn more about \"Laceration: After Your Visit to the Emergency Room. \"   © 3570-7962 Healthwise, Incorporated. Care instructions adapted under license by [employer] (which disclaims liability or warranty for this information). This care instruction is for use with your licensed healthcare professional. If you have questions about a medical condition or this instruction, always ask your healthcare professional. [place] [address] any warranty or liability for your use of this information. Content Version: 9.4.44192;  Last Revised: September 29, 2010             Wound Care: After Your Visit to the Emergency Room  Your Care Instructions  The care you need depends on the type of wound you have. Taking good care of your wound at home will help it heal quickly and will reduce your chance of infection. Even though you have been released from the emergency room, you still need to watch for any problems. The doctor carefully checked you. But sometimes problems can develop later. If you have new symptoms, or if your symptoms do not get better, return to the emergency room or call your doctor right away. A visit to the emergency room is only one step in your treatment. Even if you feel better, you still need to do what your doctor recommends, such as going to all suggested follow-up appointments and taking medicines exactly as directed. This will help you recover and help prevent future problems. How can you care for yourself at home? · Clean the area with soap and water 2 times a day, or as your doctor tells you. Don't use hydrogen peroxide or alcohol, which can slow healing. ¨ Unless your doctor gives you other directions, cover the wound with a thin layer of antibiotic ointment, such as bacitracin, and a bandage. Do not use an ointment that contains neomycin, because it can irritate the skin. ¨ Apply more ointment and replace the bandage as your doctor tells you. ¨ If the bandage is stuck to a scab, soak it in warm water to soften the scab. This will make the bandage easier to remove. · Ask your doctor if you can take an over-the-counter pain medicine. Do not take two or more pain medicines at the same time unless the doctor told you to. · Some pain is normal with a wound, but do not ignore pain that is getting worse instead of better. You could have an infection. · Your doctor may have closed your wound with stitches (sutures), staples, or skin glue. ¨ If you have stitches, your doctor may remove them after several days to 2 weeks.  Or you may have stitches that dissolve on their kay.  Kacie Mancini If you have staples, your doctor may remove them after 7 to 10 days. ¨ If your wound was closed with skin glue, the glue will wear off in a few days to 2 weeks. When should you call for help? Return to the emergency room now if:  · You have signs of infection, such as:  ¨ Increased pain, swelling, warmth, or redness around the wound. ¨ Red streaks leading from the wound. ¨ Pus draining from the wound. ¨ Swollen lymph nodes in your neck, armpits, or groin. ¨ A fever. · The wound starts to bleed, and blood soaks through the bandage. (Oozing small amounts of blood is normal.)  Call your doctor today if:  · The wound is not getting better each day. Where can you learn more? Go to Roadstruck.be  Enter P907 in the search box to learn more about \"Wound Care: After Your Visit to the Emergency Room. \"   © 6460-9524 Healthwise, Incorporated. Care instructions adapted under license by [employer] (which disclaims liability or warranty for this information). This care instruction is for use with your licensed healthcare professional. If you have questions about a medical condition or this instruction, always ask your healthcare professional. [place] [address] any warranty or liability for your use of this information.   Content Version: 9.3.08562; Last Revised: July 22, 2010

## 2019-02-26 ENCOUNTER — OFFICE VISIT (OUTPATIENT)
Dept: INTERNAL MEDICINE CLINIC | Age: 49
End: 2019-02-26

## 2019-02-26 ENCOUNTER — TELEPHONE (OUTPATIENT)
Dept: ENDOCRINOLOGY | Age: 49
End: 2019-02-26

## 2019-02-26 VITALS
HEIGHT: 64 IN | OXYGEN SATURATION: 97 % | WEIGHT: 213.6 LBS | BODY MASS INDEX: 36.47 KG/M2 | HEART RATE: 86 BPM | RESPIRATION RATE: 20 BRPM | SYSTOLIC BLOOD PRESSURE: 106 MMHG | TEMPERATURE: 98.4 F | DIASTOLIC BLOOD PRESSURE: 59 MMHG

## 2019-02-26 DIAGNOSIS — J06.9 ACUTE URI: ICD-10-CM

## 2019-02-26 DIAGNOSIS — R05.9 COUGH: Primary | ICD-10-CM

## 2019-02-26 RX ORDER — DOXYCYCLINE 100 MG/1
100 CAPSULE ORAL 2 TIMES DAILY
Qty: 14 CAP | Refills: 0 | Status: SHIPPED | OUTPATIENT
Start: 2019-02-26 | End: 2019-03-05

## 2019-02-26 RX ORDER — CODEINE PHOSPHATE AND GUAIFENESIN 10; 100 MG/5ML; MG/5ML
5 SOLUTION ORAL
Qty: 118 ML | Refills: 0 | Status: SHIPPED | OUTPATIENT
Start: 2019-02-26 | End: 2019-03-01

## 2019-02-26 NOTE — TELEPHONE ENCOUNTER
Please call and ask to they just need a letter written with this information or should I addend my last office note that was already faxed to them?

## 2019-02-26 NOTE — TELEPHONE ENCOUNTER
----- Message from [facility] sent at 2/26/2019 10:45 AM EST -----  Regarding: Dr. Cintron [facility] [phone] ext [phone] stated the insurance company needs a note stating pt was  injecting for 3x days for the last 6 months. Needs a duration note?

## 2019-02-27 RX ORDER — FUROSEMIDE 20 MG/1
60 TABLET ORAL DAILY
COMMUNITY
Start: 2019-02-27 | End: 2019-09-17

## 2019-02-27 NOTE — TELEPHONE ENCOUNTER
Faxed addended note to [phone].  Confirmed receipt with Chris desir.

## 2019-03-01 ENCOUNTER — OFFICE VISIT (OUTPATIENT)
Dept: URGENT CARE | Age: 49
End: 2019-03-01

## 2019-03-01 VITALS
WEIGHT: 213 LBS | BODY MASS INDEX: 36.37 KG/M2 | SYSTOLIC BLOOD PRESSURE: 140 MMHG | HEART RATE: 80 BPM | RESPIRATION RATE: 18 BRPM | DIASTOLIC BLOOD PRESSURE: 75 MMHG | TEMPERATURE: 97.8 F | OXYGEN SATURATION: 94 % | HEIGHT: 64 IN

## 2019-03-01 DIAGNOSIS — J98.01 ACUTE BRONCHOSPASM: ICD-10-CM

## 2019-03-01 DIAGNOSIS — R05.9 COUGH: ICD-10-CM

## 2019-03-01 DIAGNOSIS — J40 BRONCHITIS: Primary | ICD-10-CM

## 2019-03-01 RX ORDER — ALBUTEROL SULFATE 0.83 MG/ML
2.5 SOLUTION RESPIRATORY (INHALATION)
Qty: 1 PACKAGE | Refills: 0 | Status: SHIPPED | OUTPATIENT
Start: 2019-03-01 | End: 2019-05-21

## 2019-03-01 RX ORDER — BROMPHENIRAMINE MALEATE, PSEUDOEPHEDRINE HYDROCHLORIDE, AND DEXTROMETHORPHAN HYDROBROMIDE 2; 30; 10 MG/5ML; MG/5ML; MG/5ML
5 SYRUP ORAL
Qty: 118 ML | Refills: 0 | Status: SHIPPED | OUTPATIENT
Start: 2019-03-01 | End: 2019-05-21

## 2019-03-01 RX ORDER — ALBUTEROL SULFATE 0.83 MG/ML
2.5 SOLUTION RESPIRATORY (INHALATION)
Qty: 1 PACKAGE | Refills: 0 | Status: SHIPPED | OUTPATIENT
Start: 2019-03-01 | End: 2019-03-01 | Stop reason: SDUPTHER

## 2019-03-01 RX ORDER — AMOXICILLIN AND CLAVULANATE POTASSIUM 875; 125 MG/1; MG/1
1 TABLET, FILM COATED ORAL 2 TIMES DAILY
Qty: 20 TAB | Refills: 0 | Status: SHIPPED | OUTPATIENT
Start: 2019-03-01 | End: 2019-03-01 | Stop reason: SDUPTHER

## 2019-03-01 RX ORDER — AMOXICILLIN AND CLAVULANATE POTASSIUM 875; 125 MG/1; MG/1
1 TABLET, FILM COATED ORAL 2 TIMES DAILY
Qty: 20 TAB | Refills: 0 | Status: SHIPPED | OUTPATIENT
Start: 2019-03-01 | End: 2019-03-11

## 2019-03-01 RX ORDER — ALBUTEROL SULFATE 0.83 MG/ML
2.5 SOLUTION RESPIRATORY (INHALATION)
Status: COMPLETED | OUTPATIENT
Start: 2019-03-01 | End: 2019-03-01

## 2019-03-01 RX ORDER — NEBULIZER AND COMPRESSOR
1 EACH MISCELLANEOUS
Qty: 1 EACH | Refills: 0
Start: 2019-03-01 | End: 2019-03-01

## 2019-03-01 RX ORDER — BROMPHENIRAMINE MALEATE, PSEUDOEPHEDRINE HYDROCHLORIDE, AND DEXTROMETHORPHAN HYDROBROMIDE 2; 30; 10 MG/5ML; MG/5ML; MG/5ML
5 SYRUP ORAL
Qty: 118 ML | Refills: 0 | Status: SHIPPED | OUTPATIENT
Start: 2019-03-01 | End: 2019-03-01 | Stop reason: SDUPTHER

## 2019-03-01 RX ADMIN — ALBUTEROL SULFATE 2.5 MG: 0.83 SOLUTION RESPIRATORY (INHALATION) at 15:34

## 2019-03-01 NOTE — PROGRESS NOTES
Cough   The history is provided by the patient. This is a new problem. Episode onset: 1 week ago; saw PCP 3 days ago, given doxycycline and robitussin ac without improvement. The problem occurs every few minutes. The problem has been gradually worsening. The cough is non-productive. There has been no fever. Associated symptoms include rhinorrhea, shortness of breath and wheezing. Pertinent negatives include no chest pain, no chills, no sweats, no ear congestion, no ear pain, no headaches, no sore throat, no myalgias, no nausea and no vomiting. She is a smoker. Her past medical history does not include asthma. Past medical history comments: Type 1 DM.         Past Medical History:   Diagnosis Date    Achilles tendon rupture     along with torn right patellar/femoral ligament    Arthritis     rt. foot    Chronic pain     abdominal pain    Diabetes (HCC)     IDDM on insulin pump and sensor    DM type 1 (diabetes mellitus, type 1) ([facility])     age 25    Endometriosis     s/p ex-lap 4x    Gastric ulcer     GERD (gastroesophageal reflux disease)     Herpes     Other and unspecified hyperlipidemia     Panic attacks     Psychiatric disorder     anxiety, panic attacks    PTSD (post-traumatic stress disorder)     Unspecified essential hypertension         Past Surgical History:   Procedure Laterality Date    HX COLONOSCOPY      HX GYN      2 d&c's    HX GYN      laparoscopies x5 for endometriosis    HX GYN      mirena in place    HX ORTHOPAEDIC  2002    achiles tendon repair,talus repair    HX ORTHOPAEDIC      injections x2 to right shoulder    HX ORTHOPAEDIC Left 02/07/2019    3rd trigger finger release         Family History   Problem Relation Age of Onset    Diabetes Mother         diet controlled    Diabetes Father         R foot amupation    Liver Disease Father     Heart Disease Paternal Uncle         MI in his 46s    Stroke Neg Hx         Social History     Socioeconomic History    Marital status: SINGLE     Spouse name: Not on file    Number of children: Not on file    Years of education: Not on file    Highest education level: Not on file   Social Needs    Financial resource strain: Not on file    Food insecurity - worry: Not on file    Food insecurity - inability: Not on file    Transportation needs - medical: Not on file   [employer] needs - non-medical: Not on file   Occupational History    Not on file   Tobacco Use    Smoking status: Current Some Day Smoker     Packs/day: 2.00     Years: 17.00     Pack years: 34.00     Types: Cigarettes    Smokeless tobacco: Current User    Tobacco comment: Trying to quit   Substance and Sexual Activity    Alcohol use: No     Comment: a beer every few months    Drug use: No    Sexual activity: Yes     Partners: Male   Other Topics Concern    Not on file   Social History Narrative    Not on file                ALLERGIES: Zocor [simvastatin] and Lisinopril    Review of Systems   Constitutional: Negative for activity change, appetite change, chills and fever. HENT: Positive for rhinorrhea. Negative for congestion, ear pain, sinus pressure, sinus pain, sore throat and trouble swallowing. Respiratory: Positive for cough, shortness of breath and wheezing. Cardiovascular: Negative for chest pain and palpitations. Gastrointestinal: Negative for nausea and vomiting. Musculoskeletal: Negative for myalgias. Neurological: Negative for dizziness and headaches. Hematological: Negative for adenopathy. Vitals:    03/01/19 1456 03/01/19 1539 03/01/19 1550   BP: 140/75     Pulse: 80     Resp: 18     Temp: 97.8 °F (36.6 °C)     SpO2: 92% 92% 94%   Weight: 213 lb (96.6 kg)     Height: 5' 4\" (1.626 m)         Physical Exam   Constitutional: She appears well-developed and well-nourished. No distress.    HENT:   Right Ear: Tympanic membrane, external ear and ear canal normal.   Left Ear: Tympanic membrane, external ear and ear canal normal. Nose: Nose normal. Right sinus exhibits no maxillary sinus tenderness and no frontal sinus tenderness. Left sinus exhibits no maxillary sinus tenderness and no frontal sinus tenderness. Mouth/Throat: Oropharynx is clear and moist and mucous membranes are normal. No oropharyngeal exudate, posterior oropharyngeal edema, posterior oropharyngeal erythema or tonsillar abscesses. Cardiovascular: Normal rate, regular rhythm and normal heart sounds. Pulmonary/Chest: Effort normal. No respiratory distress. She has no decreased breath sounds. She has wheezes in the right upper field, the right lower field, the left upper field and the left lower field. She has no rhonchi. She has no rales. Lymphadenopathy:     She has no cervical adenopathy. Neurological: She is alert. Skin: She is not diaphoretic. Psychiatric: She has a normal mood and affect. Her behavior is normal. Judgment and thought content normal.   Nursing note and vitals reviewed. MDM    ICD-10-CM ICD-9-CM    1. Bronchitis J40 490    2. Acute bronchospasm J98.01 519.11    3. Cough R05 786.2 XR CHEST PA LAT     Medications Ordered Today   Medications    albuterol (PROVENTIL VENTOLIN) nebulizer solution 2.5 mg     Order Specific Question:   MODE OF DELIVERY     Answer:   Nebulizer    DISCONTD: albuterol (PROVENTIL VENTOLIN) 2.5 mg /3 mL (0.083 %) nebulizer solution     Sig: 3 mL by Nebulization route every four (4) hours as needed for Wheezing or Shortness of Breath. Dispense:  1 Package     Refill:  0    DISCONTD: amoxicillin-clavulanate (AUGMENTIN) 875-125 mg per tablet     Sig: Take 1 Tab by mouth two (2) times a day for 10 days. Dispense:  20 Tab     Refill:  0    DISCONTD: brompheniramine-pseudoeph-DM (BROMFED DM) 2-30-10 mg/5 mL syrup     Sig: Take 5 mL by mouth four (4) times daily as needed for Cough.      Dispense:  118 mL     Refill:  0    DISCONTD: Nebulizer & Compressor machine     Si Each by Does Not Apply route every four (4) hours as needed (cough). Dispense:  1 Each     Refill:  0    albuterol (PROVENTIL VENTOLIN) 2.5 mg /3 mL (0.083 %) nebulizer solution     Sig: 3 mL by Nebulization route every four (4) hours as needed for Wheezing or Shortness of Breath. Dispense:  1 Package     Refill:  0    amoxicillin-clavulanate (AUGMENTIN) 875-125 mg per tablet     Sig: Take 1 Tab by mouth two (2) times a day for 10 days. Dispense:  20 Tab     Refill:  0    brompheniramine-pseudoeph-DM (BROMFED DM) 2-30-10 mg/5 mL syrup     Sig: Take 5 mL by mouth four (4) times daily as needed for Cough. Dispense:  118 mL     Refill:  0     Pt reports improvement s/p neb  Lungs with less wheezing, no decreased BS    Unable to dispense neb machine here due to expense    Pt requested cough syrup with codeine multiple times. Pt on narcotics and BZs. Rx'ed bromfedDM instead    The patient is to follow up with PCP. If signs and symptoms become worse the pt is to go to the ER. The patient is to take medications as prescribed. XR Results (most recent):  Results from Appointment encounter on 03/01/19   XR CHEST PA LAT    Narrative Indication:  cough, low 02 sats     Exam: PA and lateral views of the chest.    Direct comparison is made to prior CXR dated February 2018. Findings: Cardiomediastinal silhouette is within normal limits. Lungs are clear  bilaterally. Pleural spaces are normal. Osseous structures are intact. Impression IMPRESSION: No acute cardiopulmonary disease.              Procedures

## 2019-03-01 NOTE — PATIENT INSTRUCTIONS
Bronchitis: Care Instructions  Your Care Instructions    Bronchitis is inflammation of the bronchial tubes, which carry air to the lungs. The tubes swell and produce mucus, or phlegm. The mucus and inflamed bronchial tubes make you cough. You may have trouble breathing. Most cases of bronchitis are caused by viruses like those that cause colds. Antibiotics usually do not help and they may be harmful. Bronchitis usually develops rapidly and lasts about 2 to 3 weeks in otherwise healthy people. Follow-up care is a key part of your treatment and safety. Be sure to make and go to all appointments, and call your doctor if you are having problems. It's also a good idea to know your test results and keep a list of the medicines you take. How can you care for yourself at home? · Take all medicines exactly as prescribed. Call your doctor if you think you are having a problem with your medicine. · Get some extra rest.  · Take an over-the-counter pain medicine, such as acetaminophen (Tylenol), ibuprofen (Advil, Motrin), or naproxen (Aleve) to reduce fever and relieve body aches. Read and follow all instructions on the label. · Do not take two or more pain medicines at the same time unless the doctor told you to. Many pain medicines have acetaminophen, which is Tylenol. Too much acetaminophen (Tylenol) can be harmful. · Take an over-the-counter cough medicine that contains dextromethorphan to help quiet a dry, hacking cough so that you can sleep. Avoid cough medicines that have more than one active ingredient. Read and follow all instructions on the label. · Breathe moist air from a humidifier, hot shower, or sink filled with hot water. The heat and moisture will thin mucus so you can cough it out. · Do not smoke. Smoking can make bronchitis worse. If you need help quitting, talk to your doctor about stop-smoking programs and medicines. These can increase your chances of quitting for good.   When should you call for help? Call 911 anytime you think you may need emergency care. For example, call if:    · You have severe trouble breathing.    Call your doctor now or seek immediate medical care if:    · You have new or worse trouble breathing.     · You cough up dark brown or bloody mucus (sputum).     · You have a new or higher fever.     · You have a new rash.    Watch closely for changes in your health, and be sure to contact your doctor if:    · You cough more deeply or more often, especially if you notice more mucus or a change in the color of your mucus.     · You are not getting better as expected. Where can you learn more? Go to http://mac-rufus.info/. Enter H333 in the search box to learn more about \"Bronchitis: Care Instructions. \"  Current as of: September 5, 2018  Content Version: 11.9  © 4163-9623 [email or web address]. Care instructions adapted under license by [employer] (which disclaims liability or warranty for this information). If you have questions about a medical condition or this instruction, always ask your healthcare professional. [place] [address] any warranty or liability for your use of this information. Reactive Airway Disease: Care Instructions  Your Care Instructions    Reactive airway disease is a breathing problem that appears as wheezing, a whistling noise in your airways. It may be caused by a viral or bacterial infection, allergies, tobacco smoke, or something else in the environment. When you are around these triggers, your body releases chemicals that make the airways get tight. Reactive airway disease is a lot like asthma. Both can cause wheezing. But asthma is ongoing, while reactive airway disease may occur only now and then. Tests can be done to tell whether you have asthma. You may take the same medicines used to treat asthma. Good home care and follow-up care with your doctor can help you recover.   Follow-up care is a key part of your treatment and safety. Be sure to make and go to all appointments, and call your doctor if you are having problems. It's also a good idea to know your test results and keep a list of the medicines you take. How can you care for yourself at home? · Take your medicines exactly as prescribed. Call your doctor if you think you are having a problem with your medicine. · Do not smoke or allow others to smoke around you. If you need help quitting, talk to your doctor about stop-smoking programs and medicines. These can increase your chances of quitting for good. · If you know what caused your wheezing (such as perfume or the odor of household chemicals), try to avoid it in the future. · Wash your hands several times a day, and consider using hand gels or wipes that contain alcohol. This can prevent colds and other infections. When should you call for help? Call 911 anytime you think you may need emergency care. For example, call if:    · You have severe trouble breathing.    Watch closely for changes in your health, and be sure to contact your doctor if:    · You cough up yellow, dark brown, or bloody mucus.     · You have a fever.     · Your wheezing gets worse. Where can you learn more? Go to http://mac-rufus.info/. Enter O174 in the search box to learn more about \"Reactive Airway Disease: Care Instructions. \"  Current as of: September 5, 2018  Content Version: 11.9  © 7371-1668 [employer]. Care instructions adapted under license by [employer] (which disclaims liability or warranty for this information). If you have questions about a medical condition or this instruction, always ask your healthcare professional. [address] any warranty or liability for your use of this information.

## 2019-03-11 ENCOUNTER — HOSPITAL ENCOUNTER (EMERGENCY)
Age: 49
Discharge: HOME | End: 2019-03-11
Attending: EMERGENCY MEDICINE
Payer: MEDICARE

## 2019-03-11 VITALS
HEART RATE: 84 BPM | BODY MASS INDEX: 35.87 KG/M2 | HEIGHT: 64 IN | WEIGHT: 210.1 LBS | SYSTOLIC BLOOD PRESSURE: 155 MMHG | TEMPERATURE: 98.2 F | OXYGEN SATURATION: 97 % | RESPIRATION RATE: 18 BRPM | DIASTOLIC BLOOD PRESSURE: 95 MMHG

## 2019-03-11 DIAGNOSIS — Z51.89 VISIT FOR WOUND CHECK: Primary | ICD-10-CM

## 2019-03-11 PROCEDURE — 75810000275 HC EMERGENCY DEPT VISIT NO LEVEL OF CARE

## 2019-03-11 NOTE — ED PROVIDER NOTES
EMERGENCY DEPARTMENT HISTORY AND PHYSICAL EXAM      Date: 3/11/2019  Patient Name: Scott William    History of Presenting Illness     Chief Complaint   Patient presents with    Wound Check     Ambulatory c/o healed wound to R ear Pt states her dog ripped her \"hoop earring out\" she came had stitches States stitches are out and states she \"wants it glued back together\"       History Provided By: Patient    HPI: Scott William, 51 y.o. female with PMHx significant for DM, gastric ulcer, GERD, PTSD, presents ambulatory to the ED with cc of wound on right ear. Patient sustained a laceration to her right ear, came into the ED 2 days later and had stitches placed, the stitches subsequently fell out and she still has a defect to the right earlobe. No pain or swelling. No rashes. She was hoping that this deformity could be repaired    There are no other complaints, changes, or physical findings at this time. PCP: Michael King, DO    No current facility-administered medications on file prior to encounter. Current Outpatient Medications on File Prior to Encounter   Medication Sig Dispense Refill    albuterol (PROVENTIL VENTOLIN) 2.5 mg /3 mL (0.083 %) nebulizer solution 3 mL by Nebulization route every four (4) hours as needed for Wheezing or Shortness of Breath. 1 Package 0    amoxicillin-clavulanate (AUGMENTIN) 875-125 mg per tablet Take 1 Tab by mouth two (2) times a day for 10 days. 20 Tab 0    brompheniramine-pseudoeph-DM (BROMFED DM) 2-30-10 mg/5 mL syrup Take 5 mL by mouth four (4) times daily as needed for Cough. 118 mL 0    furosemide (LASIX) 20 mg tablet Take 2 Tabs by mouth two (2) times a day.  Per Dr. Chao Reading change 2/27/19--updated med list--did not send prescription to the pharmacy      glucagon (GLUCAGON EMERGENCY KIT, HUMAN,) 1 mg injection USE A DIRECTED 2 Vial 11    carvedilol (COREG) 12.5 mg tablet Take 1/2 tab by mouth daily--Dose change 2/15/19--updated med list--did not send prescription to the pharmacy (Patient taking differently: 6.25 mg. Take 1/2 tab by mouth daily--Dose change 2/15/19--updated med list--did not send prescription to the pharmacy) 30 Tab 11    metoclopramide HCl (REGLAN) 5 mg tablet       insulin glargine (LANTUS SOLOSTAR U-100 INSULIN) 100 unit/mL (3 mL) inpn sample 1 Pen 0    varenicline (CHANTIX STARTER DELILAH) 0.5 mg (11)- 1 mg (42) DsPk Take as instructed 1 Dose Pack 0    varenicline (CHANTIX) 1 mg tablet Take 1 Tab by mouth two (2) times a day. 60 Tab 3    losartan (COZAAR) 25 mg tablet Take 1 Tab by mouth daily.  esomeprazole (NEXIUM) 40 mg capsule TAKE ONE CAPSULE BY MOUTH DAILY 30 Cap 8    insulin glargine (LANTUS SOLOSTAR U-100 INSULIN) 100 unit/mL (3 mL) inpn INJECT 30 UNITS BEFORE BREAKFAST OR AS DIRECTED UP TO 50 UNITS PER DAY 15 mL 11    albuterol (PROVENTIL HFA, VENTOLIN HFA, PROAIR HFA) 90 mcg/actuation inhaler Take 1 Puff by inhalation every four (4) hours as needed for Wheezing. 1 Inhaler 11    Insulin Needles, Disposable, (BD ULTRA-FINE MINI PEN NEEDLE) 31 gauge x 3/16\" ndle Use as directed up to 8 times daily 200 Pen Needle 11    gabapentin (NEURONTIN) 300 mg capsule TAKE TWO CAPSULES BY MOUTH THREE TIMES A  Cap 11    atorvastatin (LIPITOR) 40 mg tablet Take 1 Tab by mouth daily. 90 Tab 3    ONETOUCH ULTRA BLUE TEST STRIP strip USE AS DIRECTED TO TEST UP TO 8 TIMES PER  Strip 11    nystatin (MYCOSTATIN) 100,000 unit/mL suspension Take 5 mL by mouth four (4) times daily. swish and spit 1500 mL 1    levonorgestrel (MIRENA) 20 mcg/24 hr (5 years) IUD 1 Device by IntraUTERine route once.  ARIPiprazole (ABILIFY) 30 mg tablet Take 30 mg by mouth daily.  LORazepam (ATIVAN) 1 mg tablet Take 1 mg by mouth three (3) times daily.  fentaNYL (DURAGESIC) 100 mcg/hr PATCH 1 Patch by TransDERmal route every seventy-two (72) hours.       FREESTYLE MARIELA SENSOR kit Use 1 sensor every 10 days as directed 3 Each 11    acetaminophen (TYLENOL) 325 mg tablet Take 2 Tabs by mouth every six (6) hours as needed for Fever. 30 Tab 0    ONE TOUCH DELICA 33 gauge misc Test 8 times daily--DX E10.65 250 Lancet 11    venlafaxine-SR (EFFEXOR XR) 150 mg capsule Take 150 mg by mouth two (2) times a day.  insulin lispro (HUMALOG KWIKPEN) 100 unit/mL kwikpen 1:15 for carbs and 1:30 > 150 for correction during the day and > 180 at bedtime.   Max 50 units per day 1 Package        Past History     Past Medical History:  Past Medical History:   Diagnosis Date    Achilles tendon rupture     along with torn right patellar/femoral ligament    Arthritis     rt. foot    Chronic pain     abdominal pain    Diabetes (HCC)     IDDM on insulin pump and sensor    DM type 1 (diabetes mellitus, type 1) ([facility])     age 25    Endometriosis     s/p ex-lap 4x    Gastric ulcer     GERD (gastroesophageal reflux disease)     Herpes     Other and unspecified hyperlipidemia     Panic attacks     Psychiatric disorder     anxiety, panic attacks    PTSD (post-traumatic stress disorder)     Unspecified essential hypertension        Past Surgical History:  Past Surgical History:   Procedure Laterality Date    HX COLONOSCOPY      HX GYN      2 d&c's    HX GYN      laparoscopies x5 for endometriosis    HX GYN      mirena in place    HX ORTHOPAEDIC  2002    achiles tendon repair,talus repair    HX ORTHOPAEDIC      injections x2 to right shoulder    HX ORTHOPAEDIC Left 02/07/2019    3rd trigger finger release       Family History:  Family History   Problem Relation Age of Onset    Diabetes Mother         diet controlled    Diabetes Father         R foot amupation    Liver Disease Father     Heart Disease Paternal Uncle         MI in his 46s    Stroke Neg Hx        Social History:  Social History     Tobacco Use    Smoking status: Current Some Day Smoker     Packs/day: 2.00     Years: 17.00     Pack years: 34.00     Types: Cigarettes    Smokeless tobacco: Current User    Tobacco comment: Trying to quit   Substance Use Topics    Alcohol use: No     Comment: a beer every few months    Drug use: No       Allergies: Allergies   Allergen Reactions    Zocor [Simvastatin] Myalgia    Lisinopril Cough       Review of Systems   Review of Systems   Constitutional: Negative for chills, fatigue and fever. HENT: Negative for congestion, ear pain and rhinorrhea. Eyes: Negative for pain and visual disturbance. Respiratory: Negative for cough and shortness of breath. Cardiovascular: Negative for chest pain and leg swelling. Gastrointestinal: Negative for abdominal pain, diarrhea, nausea and vomiting. Genitourinary: Negative for dysuria and flank pain. Musculoskeletal: Negative for back pain and neck pain. Skin: Negative for rash and wound. Neurological: Negative for dizziness, syncope and headaches. Psychiatric/Behavioral: Negative for self-injury and suicidal ideas. Physical Exam   Physical Exam   Constitutional: She is oriented to person, place, and time. She appears well-developed and well-nourished. HENT:   Normocephalic atraumatic. Right earlobe has at the base, there is no swelling or erythema. The margins of the wound has fully epithelialized. Neck: No tracheal deviation present. Pulmonary/Chest: No respiratory distress. Abdominal: She exhibits no distension. Musculoskeletal: She exhibits no deformity. Neurological: She is alert and oriented to person, place, and time. Skin: Skin is warm. No rash noted. No erythema. GENERAL: alert and oriented, no acute distress  EYES: PEERL, No injection, discharge or icterus. HENT: Mucous membranes pink and moist.  NECK: Supple  LUNGS: Airway patent. Non-labored respirations. Breath sounds clear with good air entry bilaterally. HEART: Regular rate and rhythm. No peripheral edema  ABDOMEN: Non-distended and non-tender, without guarding or rebound.   SKIN:  warm, dry  MSK/ EXTREMITIES: Without swelling, tenderness or deformity, symmetric with normal ROM  NEUROLOGICAL: Alert, oriented    Diagnostic Study Results     Labs - No results found for this or any previous visit (from the past 12 hour(s)). Radiologic Studies -   No orders to display     CT Results  (Last 48 hours)    None        CXR Results  (Last 48 hours)    None        Medical Decision Making   I am the first provider for this patient. I reviewed the vital signs, available nursing notes, past medical history, past surgical history, family history and social history. Vital Signs-Reviewed the patient's vital signs. Patient Vitals for the past 12 hrs:   Temp Pulse Resp BP SpO2   03/11/19 1242 98.2 °F (36.8 °C) 84 18 (!) 155/95 97 %         Records Reviewed: Nursing Notes, Old Medical Records, Previous Radiology Studies and Previous Laboratory Studies    Provider Notes (Medical Decision Making): This wound is not repairable at this time, the wound margins have already fully epithelialized there is no sign of infection. Will refer patient to plastic surgeon. ED Course:   Initial assessment performed. The patients presenting problems have been discussed, and they are in agreement with the care plan formulated and outlined with them. I have encouraged them to ask questions as they arise throughout their visit. Critical Care Time:   none    Disposition:  DISCHARGE NOTE  3:01 PM  The patient has been re-evaluated and is ready for discharge. Reviewed available results with patient. Counseled pt on diagnosis and care plan. Pt has expressed understanding, and all questions have been answered. Pt agrees with plan and agrees to F/U as recommended, or return to the ED if their sxs worsen. Discharge instructions have been provided and explained to the pt, along with reasons to return to the ED. PLAN:  1. Current Discharge Medication List        2.    Follow-up Information     Follow up With Specialties Details Why Contact Info    [facility]  Schedule an appointment as soon as possible for a visit  [address] [facility] [phone]    \[facility] [place] [facility]\"" EMERGENCY DEPT Emergency Medicine  If symptoms worsen [address]  [address] [facility]  [phone]        Return to ED if worse     Diagnosis     Clinical Impression:   1. Visit for wound check        Attestations: This note is prepared by Melissa Cesar, acting as Scribe for [employer]. Olaf Tubbs MD.    [email or web address]. Olaf Tubbs MD: The scribe's documentation has been prepared under my direction and personally reviewed by me in its entirety. I confirm that the note above accurately reflects all work, treatment, procedures, and medical decision making performed by me. This note will not be viewable in [address]

## 2019-03-11 NOTE — ED TRIAGE NOTES
Pt reports she had stitches in right ear where her dog pulled her earring out, reports stitches came out however ear is no together and would like it glued together

## 2019-03-13 ENCOUNTER — TELEPHONE (OUTPATIENT)
Dept: ENDOCRINOLOGY | Age: 49
End: 2019-03-13

## 2019-03-13 RX ORDER — INSULIN LISPRO 100 [IU]/ML
INJECTION, SOLUTION INTRAVENOUS; SUBCUTANEOUS
Qty: 3 VIAL | Refills: 11 | Status: SHIPPED | OUTPATIENT
Start: 2019-03-13 | End: 2019-03-15 | Stop reason: CLARIF

## 2019-03-13 NOTE — TELEPHONE ENCOUNTER
René Briseno,    Can you call [facility] [place] and offer either next Wed afternoon or anytime on Thurs for pump training with Donny Cover and let me know when she's scheduled. Thanks so much!

## 2019-03-13 NOTE — TELEPHONE ENCOUNTER
Spoke to Sophie this afternoon. She would like to come next Wednesday (03-20-19) at 1:30 PM.    Patient is scheduled in [place] with Ricco Ramon for pump training at this time.

## 2019-03-13 NOTE — TELEPHONE ENCOUNTER
----- Message from TONY Cox sent at 3/13/2019 11:45 AM EDT -----  Dear Killian Wheatley,  Yes, I can do the training. Please have the  schedule next Wednesday afternoon or anytime on Thursday. Also, have them confirm day and time with the patient. Thank you for the referral,  Caroline Trevino      ----- Message -----  From: Zo Farmer MD  Sent: 3/11/2019   6:36 PM  To: TONY Cox,    This is one of my long term Type 1 diabetes patients who previously had been on a pump years ago but became homeless and lost her job and insurance and has been on injections the past 9 years. She has been approved for the Medtronic 630G pump and it has now been shipped to her. Would you be able to help her get trained? If so, how should we go about setting up a date and time that works. Thanks for your help with this.     Killian Wheatley

## 2019-03-15 ENCOUNTER — TELEPHONE (OUTPATIENT)
Dept: ENDOCRINOLOGY | Age: 49
End: 2019-03-15

## 2019-03-15 RX ORDER — INSULIN ASPART 100 [IU]/ML
INJECTION, SOLUTION INTRAVENOUS; SUBCUTANEOUS
Qty: 3 VIAL | Refills: 11 | Status: SHIPPED | OUTPATIENT
Start: 2019-03-15 | End: 2019-04-22 | Stop reason: ALTCHOICE

## 2019-03-15 NOTE — TELEPHONE ENCOUNTER
Patient was notified and she would like to know if she can use her novolog. She stated she has 4 vials.

## 2019-03-15 NOTE — TELEPHONE ENCOUNTER
Please call pt to let her know she has an unread message in [address] Sounds like Flavia Richard reached out to you to train next week with Susan Torres is excellent and will be a great resource.  Please let blanquita know if you have questions after you meet with her.  You will need vials of insulin for the pump so I sent humalog vials to your pharmacy to  prior to the visit so bring this next week with your pump supplies.  Take care.

## 2019-03-15 NOTE — TELEPHONE ENCOUNTER
Pt notified of message per Dr. Dannie Parrish and voiced understanding of what was read to her. She stated that the pharmacy informed her that the humalog was not covered. Dr. Dannie Parrish was notified and per him patient was notified that a script for the Novolog will be sent to the pharmacy.

## 2019-03-15 NOTE — TELEPHONE ENCOUNTER
This is fine to use the novolog. I forgot she had these. When they run out, she can then use the humalog vials and if she notices that one insulin works better than the other, then I can try to get her approved for the novolog as I think her insurance prefers humalog.

## 2019-03-20 ENCOUNTER — TELEPHONE (OUTPATIENT)
Dept: ENDOCRINOLOGY | Age: 49
End: 2019-03-20

## 2019-03-20 ENCOUNTER — OFFICE VISIT (OUTPATIENT)
Dept: ENDOCRINOLOGY | Age: 49
End: 2019-03-20

## 2019-03-20 DIAGNOSIS — E10.65 HYPERGLYCEMIA DUE TO TYPE 1 DIABETES MELLITUS (HCC): Primary | ICD-10-CM

## 2019-03-20 RX ORDER — NAPROXEN SODIUM 220 MG
TABLET ORAL
Qty: 100 SYRINGE | Refills: 11 | Status: SHIPPED | OUTPATIENT
Start: 2019-03-20 | End: 2020-07-28

## 2019-03-20 RX ORDER — BLOOD SUGAR DIAGNOSTIC
STRIP MISCELLANEOUS
Qty: 250 STRIP | Refills: 11 | Status: SHIPPED | OUTPATIENT
Start: 2019-03-20 | End: 2020-04-01

## 2019-03-20 NOTE — TELEPHONE ENCOUNTER
Notified by voice mail however I left her a message to call me in regards to the new medicare guidelines.

## 2019-03-20 NOTE — PROGRESS NOTES
Presentation:   Danyel Farr is a 51 y.o. female with Type 1 diabetes who returns for diabetes self-management training related to insulin pump therapy. Treated with intensive insulin injection program more recently; here to switch back to insulin pump therapy. A1c 10%. Last seen by Julian Rogers MD.    Pump settings per progress note:  Basal dose = 32 units daily = 1.3 units/hour  Bolus ratio = 1:15  Correction factor = 1:30  BG targets = 10pm to 6am (170-190) and 6am to 10pm (140-160)    Current Outpatient Medications   Medication Sig    CONTOUR NEXT TEST STRIPS strip Use as directed to test up to 8 x per day,  Uncontrolled Type 1 diabetes with Neuropathy (HCC) E10.40, E10.65    Insulin Syringe-Needle U-100 0.5 mL 31 gauge x 5/16\" syrg Use as directed up to 4 times daily    insulin aspart U-100 (NOVOLOG U-100 INSULIN ASPART) 100 unit/mL injection Use as directed for insulin pump. Max 100 units per day--BILL MEDICARE PART B--DX E10.65--REPLACES HUMALOG    albuterol (PROVENTIL VENTOLIN) 2.5 mg /3 mL (0.083 %) nebulizer solution 3 mL by Nebulization route every four (4) hours as needed for Wheezing or Shortness of Breath.  brompheniramine-pseudoeph-DM (BROMFED DM) 2-30-10 mg/5 mL syrup Take 5 mL by mouth four (4) times daily as needed for Cough.  furosemide (LASIX) 20 mg tablet Take 2 Tabs by mouth two (2) times a day. Per Dr. Brooklyn Mahajan change 2/27/19--updated med list--did not send prescription to the pharmacy    glucagon (GLUCAGON EMERGENCY KIT, HUMAN,) 1 mg injection USE A DIRECTED    carvedilol (COREG) 12.5 mg tablet Take 1/2 tab by mouth daily--Dose change 2/15/19--updated med list--did not send prescription to the pharmacy (Patient taking differently: 6.25 mg.  Take 1/2 tab by mouth daily--Dose change 2/15/19--updated med list--did not send prescription to the pharmacy)    metoclopramide HCl (REGLAN) 5 mg tablet     insulin glargine (LANTUS SOLOSTAR U-100 INSULIN) 100 unit/mL (3 mL) inpn sample    varenicline (CHANTIX STARTER DELILAH) 0.5 mg (11)- 1 mg (42) DsPk Take as instructed    varenicline (CHANTIX) 1 mg tablet Take 1 Tab by mouth two (2) times a day.  losartan (COZAAR) 25 mg tablet Take 1 Tab by mouth daily.  esomeprazole (NEXIUM) 40 mg capsule TAKE ONE CAPSULE BY MOUTH DAILY    insulin glargine (LANTUS SOLOSTAR U-100 INSULIN) 100 unit/mL (3 mL) inpn INJECT 30 UNITS BEFORE BREAKFAST OR AS DIRECTED UP TO 50 UNITS PER DAY    albuterol (PROVENTIL HFA, VENTOLIN HFA, PROAIR HFA) 90 mcg/actuation inhaler Take 1 Puff by inhalation every four (4) hours as needed for Wheezing.  Insulin Needles, Disposable, (BD ULTRA-FINE MINI PEN NEEDLE) 31 gauge x 3/16\" ndle Use as directed up to 8 times daily    gabapentin (NEURONTIN) 300 mg capsule TAKE TWO CAPSULES BY MOUTH THREE TIMES A DAY    atorvastatin (LIPITOR) 40 mg tablet Take 1 Tab by mouth daily.  ONETOUCH ULTRA BLUE TEST STRIP strip USE AS DIRECTED TO TEST UP TO 8 TIMES PER DAY    nystatin (MYCOSTATIN) 100,000 unit/mL suspension Take 5 mL by mouth four (4) times daily. swish and spit    levonorgestrel (MIRENA) 20 mcg/24 hr (5 years) IUD 1 Device by IntraUTERine route once.  ARIPiprazole (ABILIFY) 30 mg tablet Take 30 mg by mouth daily.  LORazepam (ATIVAN) 1 mg tablet Take 1 mg by mouth three (3) times daily.  fentaNYL (DURAGESIC) 100 mcg/hr PATCH 1 Patch by TransDERmal route every seventy-two (72) hours.  FREESTYLE MARIELA SENSOR kit Use 1 sensor every 10 days as directed    acetaminophen (TYLENOL) 325 mg tablet Take 2 Tabs by mouth every six (6) hours as needed for Fever.  ONE TOUCH DELICA 33 gauge misc Test 8 times daily--DX E10.65    venlafaxine-SR (EFFEXOR XR) 150 mg capsule Take 150 mg by mouth two (2) times a day. No current facility-administered medications for this visit. Medication compliance per rooming staff.     Subjective:   Diabetes Self-care Practices  Healthy Eating - Now consuming Moderate carbohydrate meals in multiple small meals. Has forgotten how to carbohydrate count. Being Active - Reports no regular exercise. Has limited exercise recently due to lower extremity edema. Monitoring - She is testing blood glucoses using [place] system. BS at this time is 468. Taking Medications - She is taking prescribed diabetes medications    Objective:   Alert, oriented and in no acute distress     There were no vitals taken for this visit. Diabetes Self-care Practices  Problemsolving - Does use blood glucose readings to make self-management decisions. Participated in constructing diabetes strategy using shared decision making  Healthy Coping - She is not anxious or depressed. Feels stable. Reducing Risks - A1c is not in range. Not on ASA,but is taking BP medications and statin therapy. Barriers to diabetes self-management-Include knowledge regarding new pump (Medtronic 630) and carbohydrate counting    Nursing Care:        Nursing Diagnosis  46947 Deficient Knowledge related to insulin pump therapy via Medtronic 630 pump   Stage of Change  Action   Nursing Intervention Domain  7179 Decision-making Support   Nursing Interventions 1. Basic insulin pump features discussed, including button functions, battery insertion, startup wizard, home screen, main menu, pump unlock, status bar icons, status screens, audio and display options                          2. Basal patterns entered & confirmed                           3. Bolus and sensitivity factor settings entered & confirmed. Delivering bolus using wizard demonstrated. Use of dual/square wave bolus option offered                           4. Blood glucose targets set    5. Reservoir loaded and tubing primed, and infusion set inserted                            6. BG meter connected to insulin pump in order to communicate wirelessly       Evaluation:   Patient is confident in ability to do the followin.    Set insulin pump to deliver ongoing basal insulin requirements  2. Use bolus function at the time of meals address mealtime insulin requirements and correct blood glucoses outside established ranges  3. Test blood glucoses 4-6 times daily  4. Change insulin pump site    Patient has clinic, physician and Clinical Nurse Specialist contact information for follow-up questions & concerns    Plan:   1. Diabetes medication reconciliation per Dennys Isbell MD  2. Patient delivered correction bolus for elevated BS using insulin pump  3. Patient advised to drink water for the next couple hours  4. Patient will change insulin infusion site on Saturday  5. Patient will do a fast this week to confirm basal dosing; send data to Dr. Woody Vasquez  6. Patient will do exercise check (pre- and post-exercise BGs to determine whether she should suspend, snack or neither  7. RTC 14 days for followup training related to dietary practices   8. RTC Dennys Isbell MD per previously scheduled appointment    NOTE: Patient needs tests for new meter system (Contour Next Link) and insulin syringes for use when pump not functioning    Memo Damon DNP, RN, ACNS-BC, BC-ADM, CDE  Adult Health Clinical Nurse Specialist-Diabetes & Endocrine  Phone: [phone]  Fax:  [phone]    3.21.19  Ms Brett Bowman called to share BG data since starting the Medtronic 630 insulin pump:    Wednesday, March 20   630pm   by fingerstick - Ate fajita and bolused 2 units    9pm   by fingerstick - (Qiana system glucose = 20 = panicked so she ate 30 grams of CHO    Thursday, March 21  1am  BG 89 per [place] system = panicked so she ate 30 grams of CHO  330am  BG 90 per [place] system = panicked so she again ate 30 grams of CHO  430am  BG 25 per [place] system = panicked so she ate 30 grams  930am   per [place] system = used pump wizard to correct hyperglycemia  10am  Contacted me.  Shared data and recommendations to John Diaz MD:     A) Use fingerstick BG data to validate settings for now     B) Reduce basal rate to 1 unit per hour; continue daytime fast  1020am Recommendations approved and communicated to physician  630pm  BG came back into range within 2 hours of correction. Patient continued to fast. Every 2 hour BGs in the 140-160     Range. Follow-up data and recommendations communicated to Verna Dash MD who approved the following:     A) Correction factor worked earlier today     B) Break fast for now     C) Validate mealtime bolus ratio of 1:15 using Lean Cuisine meal (clearly defined CHO count)     D) Call with follow-up data    Διαμαντοπούλου Arlene RN, ACNS-BC, [facility]  Clinical Nurse Specialist    3.22.19  Ms Viktoria Nichols called back with data. Thursday, March 21  730pm  BG 94 = Glucose tablets  830pm   - Ate \"Lean Cuisine\" and used bolus wizard (1:15 mealtime bolus)  945pm   but felt low; did NOT treat  1030pm     Friday, March 22  12 mid   = Glucose tablets  330am    6am    8am    11am  BG 79  = Glucose tablets    120pm  Patient has not eaten today. Data and recommendation shared with Dr. Bernard Jacobsen who approves of decrease in basal rate to 0.9 units per hour, and   continuing of 1:15 mealtime dosing and 1:30 correction dosing.     Joyce Hightower DNP, RN, ACNS-BC, BC-[facility]  Clinical Nurse Specialist

## 2019-03-20 NOTE — TELEPHONE ENCOUNTER
Patient has a new meter and needs you to call in a prescription for her test strips and syringes. She is using the Contour Next Meter now. She can be reached at:  [phone].       [place]   [phone]     Syringes & Test Strips for the Contour Next Meter

## 2019-03-20 NOTE — TELEPHONE ENCOUNTER
You can let her know this was taken care of. However, I don't think Medicare will cover her to test 8 times daily due to their new policy but I wrote it this way just to check.

## 2019-04-02 ENCOUNTER — OFFICE VISIT (OUTPATIENT)
Dept: ENDOCRINOLOGY | Age: 49
End: 2019-04-02

## 2019-04-02 DIAGNOSIS — E10.65 HYPERGLYCEMIA DUE TO TYPE 1 DIABETES MELLITUS (HCC): Primary | ICD-10-CM

## 2019-04-02 NOTE — PROGRESS NOTES
Presentation:   Abraham Garcia is a 51 y.o. female with Type 1 diabetes who returns for follow-up of insulin pump training and data interpretation. Patient is specifically interested in renewing her knowledge of carbohydrate foods and portions. Restarted on insulin pump therapy on March 20, 2019 per referral of Omari Ventura MD. Several adjustments in basal settings have been made per approval of Shobha Barbosa MD. Latest adjustment made by patient on Sunday, March 31, 2019; Ms Vinh Nichols increased her basal dosing to 1.2 units/hour at that time. Subjective:   Diabetes Self-care Practices  Healthy Eating - Now consuming Moderate carbohydrate meals 3 times on most days. Trying to use carbohydrate counting principles. Being Active - Reports no regular exercise. Is permitted to exercise again per cardiologist.  Monitoring - She is testing blood glucoses 3-8 times each day; see data download for specifics. Taking Medications - She is taking prescribed diabetes medications    Objective:   Alert, oriented and in no acute distress     There were no vitals taken for this visit. Diabetes Self-care Practices  Problemsolving - Does use blood glucose readings to make self-management decisions. Participated in constructing diabetes strategy using shared decision making  Healthy Coping - She is not anxious or depressed. Feels stable. Reducing Risks - A1c is not in range. Barriers to diabetes self-management-Include knowledge gaps.     Nursing Care:     Nursing Diagnosis  97548 Ineffective Health Management   Stage of Change  Action   Nursing Intervention Domain 4141 Decision-making Support   Nursing Interventions Instructed in carbohydrate foods and portions using food models, with specific focus on vegetables that she has not been using in carbohydrate count  Used examples from usual meal plan to explain information  Encouraged use of measuring cups for near future to confirm usual portions  Examined downloaded insulin pump data for patterns     Evaluation:   Patient is ready, willing, and agrees to renew carb counting knowledge of usual foods. Downloaded data from insulin pump indicate adequacy of current settings. As patient begins to dose for foods that she has not been doing, she may need less basal, as current basal may be covering these foods. Plan:   1. Emply carb counting skills in making decisions regarding mealtime insulin dosing   2. Measure foods for the near future  3. Downloaded pump data shared with Esther Lebron MD for further adjustments in insulin pump settings (none recommended by me)  4. RTC 3 weeks for follow-up with me  5.  RTC previously scheduled appointment with Dr. Baljit Guadalupe, RN, ACNS-BC, BC-ADM, CDE  Adult Health Clinical Nurse Specialist-Diabetes & Endocrine  Phone: [phone]  Fax:  [phone]

## 2019-04-22 ENCOUNTER — TELEPHONE (OUTPATIENT)
Dept: ENDOCRINOLOGY | Age: 49
End: 2019-04-22

## 2019-04-22 RX ORDER — INSULIN LISPRO 100 [IU]/ML
INJECTION, SOLUTION INTRAVENOUS; SUBCUTANEOUS
Qty: 3 VIAL | Refills: 11 | Status: SHIPPED | OUTPATIENT
Start: 2019-04-22 | End: 2020-03-23 | Stop reason: ALTCHOICE

## 2019-04-22 NOTE — TELEPHONE ENCOUNTER
Patient called stating she can't afford to keep paying for her Novolog Insulin, she would like a call back at [phone] to discuss a medication that is more cost efficient for her.

## 2019-04-22 NOTE — TELEPHONE ENCOUNTER
Called patient and got her answering machine with her name on it, so I left her a message letting her know that you sent Humalog vials to her pharmacy and that her insurance should cover it.

## 2019-04-22 NOTE — TELEPHONE ENCOUNTER
Please let her know that I will send humalog vials to her pharmacy as these should work the same as the novolog and should be covered under her Medicare for her pump.

## 2019-05-17 ENCOUNTER — TELEPHONE (OUTPATIENT)
Dept: ENDOCRINOLOGY | Age: 49
End: 2019-05-17

## 2019-05-17 ENCOUNTER — HOSPITAL ENCOUNTER (EMERGENCY)
Age: 49
Discharge: HOME | End: 2019-05-17
Attending: EMERGENCY MEDICINE
Payer: MEDICARE

## 2019-05-17 VITALS
TEMPERATURE: 98.3 F | SYSTOLIC BLOOD PRESSURE: 147 MMHG | HEIGHT: 65 IN | WEIGHT: 197.09 LBS | OXYGEN SATURATION: 100 % | BODY MASS INDEX: 32.84 KG/M2 | HEART RATE: 87 BPM | RESPIRATION RATE: 16 BRPM | DIASTOLIC BLOOD PRESSURE: 84 MMHG

## 2019-05-17 DIAGNOSIS — R73.9 HYPERGLYCEMIA: Primary | ICD-10-CM

## 2019-05-17 LAB
ALBUMIN SERPL-MCNC: 3.8 G/DL (ref 3.5–5)
ALBUMIN/GLOB SERPL: 1 {RATIO} (ref 1.1–2.2)
ALP SERPL-CCNC: 124 U/L (ref 45–117)
ALT SERPL-CCNC: 19 U/L (ref 12–78)
ANION GAP SERPL CALC-SCNC: 8 MMOL/L (ref 5–15)
APPEARANCE UR: CLEAR
AST SERPL-CCNC: 25 U/L (ref 15–37)
BACTERIA URNS QL MICRO: ABNORMAL /HPF
BASOPHILS # BLD: 0 K/UL (ref 0–0.1)
BASOPHILS NFR BLD: 1 % (ref 0–1)
BILIRUB SERPL-MCNC: 0.8 MG/DL (ref 0.2–1)
BILIRUB UR QL CFM: NEGATIVE
BUN SERPL-MCNC: 19 MG/DL (ref 6–20)
BUN/CREAT SERPL: 15 (ref 12–20)
CALCIUM SERPL-MCNC: 8.9 MG/DL (ref 8.5–10.1)
CHLORIDE SERPL-SCNC: 93 MMOL/L (ref 97–108)
CO2 SERPL-SCNC: 28 MMOL/L (ref 21–32)
COLOR UR: ABNORMAL
CREAT SERPL-MCNC: 1.27 MG/DL (ref 0.55–1.02)
DIFFERENTIAL METHOD BLD: ABNORMAL
EOSINOPHIL # BLD: 0 K/UL (ref 0–0.4)
EOSINOPHIL NFR BLD: 0 % (ref 0–7)
EPITH CASTS URNS QL MICRO: ABNORMAL /LPF
ERYTHROCYTE [DISTWIDTH] IN BLOOD BY AUTOMATED COUNT: 15.7 % (ref 11.5–14.5)
GLOBULIN SER CALC-MCNC: 3.9 G/DL (ref 2–4)
GLUCOSE BLD STRIP.AUTO-MCNC: 257 MG/DL (ref 65–100)
GLUCOSE BLD STRIP.AUTO-MCNC: 305 MG/DL (ref 65–100)
GLUCOSE BLD STRIP.AUTO-MCNC: 364 MG/DL (ref 65–100)
GLUCOSE SERPL-MCNC: 280 MG/DL (ref 65–100)
GLUCOSE UR STRIP.AUTO-MCNC: >1000 MG/DL
HCT VFR BLD AUTO: 41.2 % (ref 35–47)
HGB BLD-MCNC: 13.6 G/DL (ref 11.5–16)
HGB UR QL STRIP: NEGATIVE
IMM GRANULOCYTES # BLD AUTO: 0 K/UL (ref 0–0.04)
IMM GRANULOCYTES NFR BLD AUTO: 0 % (ref 0–0.5)
KETONES UR QL STRIP.AUTO: ABNORMAL MG/DL
LEUKOCYTE ESTERASE UR QL STRIP.AUTO: NEGATIVE
LYMPHOCYTES # BLD: 3.1 K/UL (ref 0.8–3.5)
LYMPHOCYTES NFR BLD: 35 % (ref 12–49)
MCH RBC QN AUTO: 28 PG (ref 26–34)
MCHC RBC AUTO-ENTMCNC: 33 G/DL (ref 30–36.5)
MCV RBC AUTO: 84.8 FL (ref 80–99)
MONOCYTES # BLD: 0.7 K/UL (ref 0–1)
MONOCYTES NFR BLD: 7 % (ref 5–13)
NEUTS SEG # BLD: 5.1 K/UL (ref 1.8–8)
NEUTS SEG NFR BLD: 57 % (ref 32–75)
NITRITE UR QL STRIP.AUTO: NEGATIVE
NRBC # BLD: 0 K/UL (ref 0–0.01)
NRBC BLD-RTO: 0 PER 100 WBC
PH UR STRIP: 5.5 [PH] (ref 5–8)
PLATELET # BLD AUTO: 340 K/UL (ref 150–400)
PMV BLD AUTO: 9.5 FL (ref 8.9–12.9)
POTASSIUM SERPL-SCNC: 4.6 MMOL/L (ref 3.5–5.1)
PROT SERPL-MCNC: 7.7 G/DL (ref 6.4–8.2)
PROT UR STRIP-MCNC: NEGATIVE MG/DL
RBC # BLD AUTO: 4.86 M/UL (ref 3.8–5.2)
RBC #/AREA URNS HPF: ABNORMAL /HPF (ref 0–5)
SERVICE CMNT-IMP: ABNORMAL
SODIUM SERPL-SCNC: 129 MMOL/L (ref 136–145)
SP GR UR REFRACTOMETRY: 1.02 (ref 1–1.03)
UA: UC IF INDICATED,UAUC: ABNORMAL
UROBILINOGEN UR QL STRIP.AUTO: 0.2 EU/DL (ref 0.2–1)
WBC # BLD AUTO: 8.9 K/UL (ref 3.6–11)
WBC URNS QL MICRO: ABNORMAL /HPF (ref 0–4)

## 2019-05-17 PROCEDURE — 74011250636 HC RX REV CODE- 250/636: Performed by: EMERGENCY MEDICINE

## 2019-05-17 PROCEDURE — 85025 COMPLETE CBC W/AUTO DIFF WBC: CPT

## 2019-05-17 PROCEDURE — 96361 HYDRATE IV INFUSION ADD-ON: CPT

## 2019-05-17 PROCEDURE — 87086 URINE CULTURE/COLONY COUNT: CPT

## 2019-05-17 PROCEDURE — 82962 GLUCOSE BLOOD TEST: CPT

## 2019-05-17 PROCEDURE — 99284 EMERGENCY DEPT VISIT MOD MDM: CPT

## 2019-05-17 PROCEDURE — 96374 THER/PROPH/DIAG INJ IV PUSH: CPT

## 2019-05-17 PROCEDURE — 81001 URINALYSIS AUTO W/SCOPE: CPT

## 2019-05-17 PROCEDURE — 36415 COLL VENOUS BLD VENIPUNCTURE: CPT

## 2019-05-17 PROCEDURE — 74011636637 HC RX REV CODE- 636/637: Performed by: EMERGENCY MEDICINE

## 2019-05-17 PROCEDURE — 74011250637 HC RX REV CODE- 250/637: Performed by: EMERGENCY MEDICINE

## 2019-05-17 PROCEDURE — 80053 COMPREHEN METABOLIC PANEL: CPT

## 2019-05-17 RX ORDER — ACETAMINOPHEN 325 MG/1
650 TABLET ORAL ONCE
Status: COMPLETED | OUTPATIENT
Start: 2019-05-17 | End: 2019-05-17

## 2019-05-17 RX ADMIN — ACETAMINOPHEN 650 MG: 325 TABLET ORAL at 16:41

## 2019-05-17 RX ADMIN — SODIUM CHLORIDE 1000 ML: 900 INJECTION, SOLUTION INTRAVENOUS at 15:43

## 2019-05-17 RX ADMIN — HUMAN INSULIN 10 UNITS: 100 INJECTION, SOLUTION SUBCUTANEOUS at 16:17

## 2019-05-17 NOTE — ED PROVIDER NOTES
EMERGENCY DEPARTMENT HISTORY AND PHYSICAL EXAM      Date: 5/17/2019  Patient Name: Amy Shaw    History of Presenting Illness     Chief Complaint   Patient presents with    High Blood Sugar     Patient reports blood sugar has been in the 500-600s x 2 days, pt reports her insulin pump fell off 1 hour PTA, states she is followed by Endocrinologist Dr. Oren Sood       History Provided By: Patient    HPI: Amy Shaw, 51 y.o. female with PMHx significant for insulin dependent diabetes, presents to the ED with cc of blood sugar. Patient states that her sugar has been running in the 400-600 range for the last several days. Notes that she changed her insulin pump site multiple times with no relief. States that her pump fell out this morning about an hour before she came to the emergency department and she has not placed in a new site since that time. She notes urinary frequency with some mild dysuria. No cough or cold symptoms. PCP: Parker Moreno, DO    There are no other complaints, changes, or physical findings at this time. Current Outpatient Medications   Medication Sig Dispense Refill    insulin lispro (HUMALOG) 100 unit/mL injection Use as directed for insulin pump. Max 100 units per day--BILL MEDICARE PART B--DX E10.65--REPLACES NOVOLOG 3 Vial 11    CONTOUR NEXT TEST STRIPS strip Use as directed to test up to 8 x per day,  Uncontrolled Type 1 diabetes with Neuropathy (HCC) E10.40, E10.65 250 Strip 11    Insulin Syringe-Needle U-100 0.5 mL 31 gauge x 5/16\" syrg Use as directed up to 4 times daily 100 Syringe 11    albuterol (PROVENTIL VENTOLIN) 2.5 mg /3 mL (0.083 %) nebulizer solution 3 mL by Nebulization route every four (4) hours as needed for Wheezing or Shortness of Breath. 1 Package 0    brompheniramine-pseudoeph-DM (BROMFED DM) 2-30-10 mg/5 mL syrup Take 5 mL by mouth four (4) times daily as needed for Cough.  118 mL 0    furosemide (LASIX) 20 mg tablet Take 2 Tabs by mouth two (2) times a day. Per Dr. Aspen Garcia change 2/27/19--updated med list--did not send prescription to the pharmacy      glucagon (GLUCAGON EMERGENCY KIT, HUMAN,) 1 mg injection USE A DIRECTED 2 Vial 11    carvedilol (COREG) 12.5 mg tablet Take 1/2 tab by mouth daily--Dose change 2/15/19--updated med list--did not send prescription to the pharmacy (Patient taking differently: 6.25 mg. Take 1/2 tab by mouth daily--Dose change 2/15/19--updated med list--did not send prescription to the pharmacy) 30 Tab 11    metoclopramide HCl (REGLAN) 5 mg tablet       insulin glargine (LANTUS SOLOSTAR U-100 INSULIN) 100 unit/mL (3 mL) inpn sample 1 Pen 0    varenicline (CHANTIX STARTER DELILAH) 0.5 mg (11)- 1 mg (42) DsPk Take as instructed 1 Dose Pack 0    varenicline (CHANTIX) 1 mg tablet Take 1 Tab by mouth two (2) times a day. 60 Tab 3    losartan (COZAAR) 25 mg tablet Take 1 Tab by mouth daily.  esomeprazole (NEXIUM) 40 mg capsule TAKE ONE CAPSULE BY MOUTH DAILY 30 Cap 8    insulin glargine (LANTUS SOLOSTAR U-100 INSULIN) 100 unit/mL (3 mL) inpn INJECT 30 UNITS BEFORE BREAKFAST OR AS DIRECTED UP TO 50 UNITS PER DAY 15 mL 11    albuterol (PROVENTIL HFA, VENTOLIN HFA, PROAIR HFA) 90 mcg/actuation inhaler Take 1 Puff by inhalation every four (4) hours as needed for Wheezing. 1 Inhaler 11    Insulin Needles, Disposable, (BD ULTRA-FINE MINI PEN NEEDLE) 31 gauge x 3/16\" ndle Use as directed up to 8 times daily 200 Pen Needle 11    gabapentin (NEURONTIN) 300 mg capsule TAKE TWO CAPSULES BY MOUTH THREE TIMES A  Cap 11    atorvastatin (LIPITOR) 40 mg tablet Take 1 Tab by mouth daily. 90 Tab 3    ONETOUCH ULTRA BLUE TEST STRIP strip USE AS DIRECTED TO TEST UP TO 8 TIMES PER  Strip 11    nystatin (MYCOSTATIN) 100,000 unit/mL suspension Take 5 mL by mouth four (4) times daily. swish and spit 1500 mL 1    levonorgestrel (MIRENA) 20 mcg/24 hr (5 years) IUD 1 Device by IntraUTERine route once.       ARIPiprazole (ABILIFY) 30 mg tablet Take 30 mg by mouth daily.  LORazepam (ATIVAN) 1 mg tablet Take 1 mg by mouth three (3) times daily.  fentaNYL (DURAGESIC) 100 mcg/hr PATCH 1 Patch by TransDERmal route every seventy-two (72) hours.  FREESTYLE MARIELA SENSOR kit Use 1 sensor every 10 days as directed 3 Each 11    acetaminophen (TYLENOL) 325 mg tablet Take 2 Tabs by mouth every six (6) hours as needed for Fever. 30 Tab 0    ONE TOUCH DELICA 33 gauge misc Test 8 times daily--DX E10.65 250 Lancet 11    venlafaxine-SR (EFFEXOR XR) 150 mg capsule Take 150 mg by mouth two (2) times a day.        Past History     Past Medical History:  Past Medical History:   Diagnosis Date    Achilles tendon rupture     along with torn right patellar/femoral ligament    Arthritis     rt. foot    Chronic pain     abdominal pain    Diabetes (HCC)     IDDM on insulin pump and sensor    DM type 1 (diabetes mellitus, type 1) ([facility])     age 25    Endometriosis     s/p ex-lap 4x    Gastric ulcer     GERD (gastroesophageal reflux disease)     Herpes     Other and unspecified hyperlipidemia     Panic attacks     Psychiatric disorder     anxiety, panic attacks    PTSD (post-traumatic stress disorder)     Unspecified essential hypertension      Past Surgical History:  Past Surgical History:   Procedure Laterality Date    HX COLONOSCOPY      HX GYN      2 d&c's    HX GYN      laparoscopies x5 for endometriosis    HX GYN      mirena in place    HX ORTHOPAEDIC  2002    achiles tendon repair,talus repair    HX ORTHOPAEDIC      injections x2 to right shoulder    HX ORTHOPAEDIC Left 02/07/2019    3rd trigger finger release     Family History:  Family History   Problem Relation Age of Onset    Diabetes Mother         diet controlled    Diabetes Father         R foot amupation    Liver Disease Father     Heart Disease Paternal Uncle         MI in his 46s    Stroke Neg Hx      Social History:  Social History Tobacco Use    Smoking status: Current Some Day Smoker     Packs/day: 2.00     Years: 17.00     Pack years: 34.00     Types: Cigarettes    Smokeless tobacco: Current User    Tobacco comment: Trying to quit   Substance Use Topics    Alcohol use: No     Comment: a beer every few months    Drug use: No     Types: OTC, Prescription     Allergies: Allergies   Allergen Reactions    Zocor [Simvastatin] Myalgia    Lisinopril Cough     Review of Systems   Review of Systems   Constitutional: Negative for chills and fever. HENT: Negative for congestion, rhinorrhea and sore throat. Respiratory: Negative for cough and shortness of breath. Cardiovascular: Negative for chest pain. Gastrointestinal: Negative for abdominal pain, nausea and vomiting. Endocrine:        Elevated BG   Genitourinary: Positive for frequency. Negative for dysuria and urgency. Skin: Negative for rash. Neurological: Negative for dizziness, light-headedness and headaches. All other systems reviewed and are negative. Physical Exam   Physical Exam   Constitutional: She is oriented to person, place, and time. She appears well-developed and well-nourished. No distress. HENT:   Head: Normocephalic and atraumatic. Slightly dry mucous membranes   Eyes: Pupils are equal, round, and reactive to light. Conjunctivae and EOM are normal.   Neck: Normal range of motion. Cardiovascular: Normal rate, regular rhythm and intact distal pulses. Pulmonary/Chest: Effort normal and breath sounds normal. No stridor. No respiratory distress. Abdominal: Soft. She exhibits no distension. There is no tenderness. Musculoskeletal: Normal range of motion. Neurological: She is alert and oriented to person, place, and time. Skin: Skin is warm and dry. Psychiatric: Her mood appears anxious. Nursing note and vitals reviewed.     Diagnostic Study Results   Labs -     Recent Results (from the past 12 hour(s))   GLUCOSE, POC    Collection Time: 05/17/19 12:42 PM   Result Value Ref Range    Glucose (POC) 257 (H) 65 - 100 mg/dL    Performed by Heriberto Kyle (PCT)    URINALYSIS W/ REFLEX CULTURE    Collection Time: 05/17/19 12:49 PM   Result Value Ref Range    Color YELLOW/STRAW      Appearance CLEAR CLEAR      Specific gravity 1.020 1.003 - 1.030      pH (UA) 5.5 5.0 - 8.0      Protein NEGATIVE  NEG mg/dL    Glucose >1,000 (A) NEG mg/dL    Ketone TRACE (A) NEG mg/dL    Blood NEGATIVE  NEG      Urobilinogen 0.2 0.2 - 1.0 EU/dL    Nitrites NEGATIVE  NEG      Leukocyte Esterase NEGATIVE  NEG      WBC 5-10 0 - 4 /hpf    RBC 0-5 0 - 5 /hpf    Epithelial cells FEW FEW /lpf    Bacteria 1+ (A) NEG /hpf    UA:UC IF INDICATED URINE CULTURE ORDERED (A) CNI     BILIRUBIN, CONFIRM    Collection Time: 05/17/19 12:49 PM   Result Value Ref Range    Bilirubin UA, confirm NEGATIVE  NEG     CBC WITH AUTOMATED DIFF    Collection Time: 05/17/19  1:02 PM   Result Value Ref Range    WBC 8.9 3.6 - 11.0 K/uL    RBC 4.86 3.80 - 5.20 M/uL    HGB 13.6 11.5 - 16.0 g/dL    HCT 41.2 35.0 - 47.0 %    MCV 84.8 80.0 - 99.0 FL    MCH 28.0 26.0 - 34.0 PG    MCHC 33.0 30.0 - 36.5 g/dL    RDW 15.7 (H) 11.5 - 14.5 %    PLATELET 480 737 - 018 K/uL    MPV 9.5 8.9 - 12.9 FL    NRBC 0.0 0  WBC    ABSOLUTE NRBC 0.00 0.00 - 0.01 K/uL    NEUTROPHILS 57 32 - 75 %    LYMPHOCYTES 35 12 - 49 %    MONOCYTES 7 5 - 13 %    EOSINOPHILS 0 0 - 7 %    BASOPHILS 1 0 - 1 %    IMMATURE GRANULOCYTES 0 0.0 - 0.5 %    ABS. NEUTROPHILS 5.1 1.8 - 8.0 K/UL    ABS. LYMPHOCYTES 3.1 0.8 - 3.5 K/UL    ABS. MONOCYTES 0.7 0.0 - 1.0 K/UL    ABS. EOSINOPHILS 0.0 0.0 - 0.4 K/UL    ABS. BASOPHILS 0.0 0.0 - 0.1 K/UL    ABS. IMM.  GRANS. 0.0 0.00 - 0.04 K/UL    DF AUTOMATED     METABOLIC PANEL, COMPREHENSIVE    Collection Time: 05/17/19  1:02 PM   Result Value Ref Range    Sodium 129 (L) 136 - 145 mmol/L    Potassium 4.6 3.5 - 5.1 mmol/L    Chloride 93 (L) 97 - 108 mmol/L    CO2 28 21 - 32 mmol/L    Anion gap 8 5 - 15 mmol/L Glucose 280 (H) 65 - 100 mg/dL    BUN 19 6 - 20 MG/DL    Creatinine 1.27 (H) 0.55 - 1.02 MG/DL    BUN/Creatinine ratio 15 12 - 20      GFR est AA 54 (L) >60 ml/min/1.73m2    GFR est non-AA 45 (L) >60 ml/min/1.73m2    Calcium 8.9 8.5 - 10.1 MG/DL    Bilirubin, total 0.8 0.2 - 1.0 MG/DL    ALT (SGPT) 19 12 - 78 U/L    AST (SGOT) 25 15 - 37 U/L    Alk. phosphatase 124 (H) 45 - 117 U/L    Protein, total 7.7 6.4 - 8.2 g/dL    Albumin 3.8 3.5 - 5.0 g/dL    Globulin 3.9 2.0 - 4.0 g/dL    A-G Ratio 1.0 (L) 1.1 - 2.2     GLUCOSE, POC    Collection Time: 05/17/19  3:58 PM   Result Value Ref Range    Glucose (POC) 364 (H) 65 - 100 mg/dL    Performed by [address] [place] [address], POC    Collection Time: 05/17/19  5:36 PM   Result Value Ref Range    Glucose (POC) 305 (H) 65 - 100 mg/dL    Performed by Subhash Fontanez (PCT)        Radiologic Studies -   No orders to display     No results found. Medical Decision Making   I am the first provider for this patient. I reviewed the vital signs, available nursing notes, past medical history, past surgical history, family history and social history. Vital Signs-Reviewed the patient's vital signs. Patient Vitals for the past 12 hrs:   Temp Pulse Resp BP SpO2   05/17/19 1240 98.3 °F (36.8 °C) 87 16 147/84 100 %         Records Reviewed: Nursing Notes and Old Medical Records    Provider Notes (Medical Decision Making):   DDX: Hyperglycemia, will rule out DKA with basic lab work and urinalysis. Elevated blood sugar may be secondary to incorrect placement of insulin pump, additionally patient's insulin pump is been off for several hours. Will give fluids and insulin and reevaluate. ED Course:   Initial assessment performed. The patients presenting problems have been discussed, and they are in agreement with the care plan formulated and outlined with them. I have encouraged them to ask questions as they arise throughout their visit. Labs without signs of DKA.   Blood sugar improved with fluids and insulin. Patient advised to follow-up with her endocrinologist.  Return precautions discussed. Critical Care:  None    Disposition:  Discharge Note:  5:54 PM  The patient has been re-evaluated and is ready for discharge. Reviewed available results with patient. Counseled patient on diagnosis and care plan. Patient has expressed understanding, and all questions have been answered. Patient agrees with plan and agrees to follow up as recommended, or to return to the ED if their symptoms worsen. Discharge instructions have been provided and explained to the patient, along with reasons to return to the ED. PLAN:  1. Discharge Medication List as of 5/17/2019  5:55 PM        2. Follow-up Information     Follow up With Specialties Details Why Anjana Laurent, Silvio Harley DO Internal Medicine Schedule an appointment as soon as possible for a visit  [address] [facility]  [address] [place] [address]      Jesica Mccoy MD Endocrinology Schedule an appointment as soon as possible for a visit  [address] [facility] [address] [facility]  [phone]      [facility] EMERGENCY DEPT Emergency Medicine  As needed, If symptoms worsen [address] [place] [address]  [phone]        Return to ED if worse     Diagnosis     Clinical Impression:   1. Hyperglycemia        This note will not be viewable in [employer].

## 2019-05-17 NOTE — ED NOTES
Assumed care of pt from triage. Pt resting in bed, very anxious, upon this nurse walking into room, pt said \" I need insulin\" family member;dad very upset about why we aren't doing insulin right now\".  per pt glucometer.

## 2019-05-17 NOTE — TELEPHONE ENCOUNTER
Patient left a voicemail regarding elevated sugar reading of 500n x2 with leg cramps. I called patient but no answer.       trc x1

## 2019-05-17 NOTE — DISCHARGE INSTRUCTIONS
Patient Education        Learning About High Blood Sugar  What is high blood sugar? Your body turns the food you eat into glucose (sugar), which it uses for energy. But if your body isn't able to use the sugar right away, it can build up in your blood and lead to high blood sugar. When the amount of sugar in your blood stays too high for too much of the time, you may have diabetes. Diabetes is a disease that can cause serious health problems. The good news is that lifestyle changes may help you get your blood sugar back to normal and avoid or delay diabetes. What causes high blood sugar? Sugar (glucose) can build up in your blood if you:  · Are overweight. · Have a family history of diabetes. · Take certain medicines, such as steroids. What are the symptoms? Having high blood sugar may not cause any symptoms at all. Or it may make you feel very thirsty or very hungry. You may also urinate more often than usual, have blurry vision, or lose weight without trying. How is high blood sugar treated? You can take steps to lower your blood sugar level if you understand what makes it get higher. Your doctor may want you to learn how to test your blood sugar level at home. Then you can see how illness, stress, or different kinds of food or medicine raise or lower your blood sugar level. Other tests may be needed to see if you have diabetes. How can you prevent high blood sugar? · Watch your weight. If you're overweight, losing just a small amount of weight may help. Reducing fat around your waist is most important. · Limit the amount of calories, sweets, and unhealthy fat you eat. Ask your doctor if a dietitian can help you. A registered dietitian can help you create meal plans that fit your lifestyle. · Get at least 30 minutes of exercise on most days of the week. Exercise helps control your blood sugar. It also helps you maintain a healthy weight. Walking is a good choice.  You also may want to do other activities, such as running, swimming, cycling, or playing tennis or team sports. · If your doctor prescribed medicines, take them exactly as prescribed. Call your doctor if you think you are having a problem with your medicine. You will get more details on the specific medicines your doctor prescribes. Follow-up care is a key part of your treatment and safety. Be sure to make and go to all appointments, and call your doctor if you are having problems. It's also a good idea to know your test results and keep a list of the medicines you take. Where can you learn more? Go to http://mac-rufus.info/. Enter O108 in the search box to learn more about \"Learning About High Blood Sugar. \"  Current as of: July 25, 2018  Content Version: 11.9  © 9972-5208 [employer]. Care instructions adapted under license by [employer] (which disclaims liability or warranty for this information). If you have questions about a medical condition or this instruction, always ask your healthcare professional. [address] any warranty or liability for your use of this information.

## 2019-05-17 NOTE — TELEPHONE ENCOUNTER
Phil Garcia (friend) called back and stated that Ivan Sanchez sugar was over 300 so he took her to [facility]. I spoke with Ivan Sanchez and she stated that she took extra insulin to bring it down but it was still over 300 so she came to ER.

## 2019-05-17 NOTE — ED NOTES
Pt reports she took 70 units of humalog this morning at 0400  Due to BS was reading high and then 2 hours later it was 496. Last 2 days it has been high. C/O headache now. Denies vomiting but nauseaous.

## 2019-05-19 LAB
BACTERIA SPEC CULT: NORMAL
CC UR VC: NORMAL
SERVICE CMNT-IMP: NORMAL

## 2019-05-20 ENCOUNTER — TELEPHONE (OUTPATIENT)
Dept: ENDOCRINOLOGY | Age: 49
End: 2019-05-20

## 2019-05-20 NOTE — TELEPHONE ENCOUNTER
Patient called to say that insurance is denying her Gabapentin and she would like to ask some questions. She can be reached at:  [phone].

## 2019-05-21 ENCOUNTER — OFFICE VISIT (OUTPATIENT)
Dept: ENDOCRINOLOGY | Age: 49
End: 2019-05-21

## 2019-05-21 VITALS
HEIGHT: 65 IN | SYSTOLIC BLOOD PRESSURE: 132 MMHG | DIASTOLIC BLOOD PRESSURE: 78 MMHG | HEART RATE: 78 BPM | BODY MASS INDEX: 32.69 KG/M2 | WEIGHT: 196.2 LBS

## 2019-05-21 DIAGNOSIS — I10 ESSENTIAL HYPERTENSION: ICD-10-CM

## 2019-05-21 DIAGNOSIS — E78.5 HYPERLIPIDEMIA LDL GOAL <100: ICD-10-CM

## 2019-05-21 DIAGNOSIS — R94.6 BORDERLINE ABNORMAL THYROID FUNCTION TEST: ICD-10-CM

## 2019-05-21 DIAGNOSIS — R79.89 ELEVATED LFTS: ICD-10-CM

## 2019-05-21 LAB — HBA1C MFR BLD HPLC: 10.1 %

## 2019-05-21 RX ORDER — CITALOPRAM 20 MG/1
40 TABLET, FILM COATED ORAL DAILY
COMMUNITY
Start: 2019-05-20 | End: 2022-09-13

## 2019-05-21 RX ORDER — TRAZODONE HYDROCHLORIDE 50 MG/1
100 TABLET ORAL
COMMUNITY
Start: 2019-05-06 | End: 2022-03-10

## 2019-05-21 NOTE — PATIENT INSTRUCTIONS
1) Current settings are as follows:  - basal: 1.5  - Carb ratio: 15  - sensitivity: 20  - target: 12a: 130-150, 6a: 100-130, 10p: 130-150  - active insulin time: 3 hours    2) Give me an update over mychart in 1 week with how this is working as my hope is with more aggressive targets and sensitivity (correction factor) and a lower active insulin time, this will allow you to receive more insulin for high sugars to come back down towards normal.

## 2019-05-21 NOTE — PROGRESS NOTES
Chief Complaint   Patient presents with    Diabetes     pcp and pharmacy confirmed     History of Present Illness: Kee Dominguez is a 51 y.o. female here for follow up of diabetes. Weight down 17 lbs since last visit in 2/19. Current settings are as follows:  - basal: 1.5  - Carb ratio: 15  - sensitivity: 30  - target: 12a: 170-190, 6a: 140-160, 10p: 170-190  - active insulin time: 4 hours    She was started on the Medtronic 630G pump in March and underwent training by Carlos Campbell. She has adjusted up her basal rate from 1.2 in 4/19 to 1.5 as she continues to have a lot of highs in the 300s or higher. She is not able to bring her sugars back down after correcting for highs and sometimes has to give manual injections to bring down her sugars. She went to the ER last week for high sugars and no cause was found and was discharged. She has been trying to exercise 3 times a week. Her swelling is doing much better. States Dr. Rainer Bauman referred her to a cardiologist to make sure nothing is wrong with her heart but she doesn't know whom she'll be seeing. she has the following indications to continue treatment with freestyle ellen:  1) she has type 1 diabetes and is on an intensive insulin regimen with an insulin pump  2) she tests her blood sugar 8 times per day and makes treatment decisions off her blood sugar readings and her sensor readings  3) she requires adjustments to her insulin pump setttings based on her sensor readings  4) she has benefitted from therapeutic continuous glucose monitoring and I recommend that she continue this  5) she is seen in my office every 3 months       Current Outpatient Medications   Medication Sig    citalopram (CELEXA) 20 mg tablet     traZODone (DESYREL) 50 mg tablet     insulin lispro (HUMALOG) 100 unit/mL injection Use as directed for insulin pump.   Max 100 units per day--BILL MEDICARE PART B--DX E10.65--REPLACES NOVOLOG    CONTOUR NEXT TEST STRIPS strip Use as directed to test up to 8 x per day,  Uncontrolled Type 1 diabetes with Neuropathy ([facility] [address] E10.40, E10.65    Insulin Syringe-Needle U-100 0.5 mL 31 gauge x 5/16\" syrg Use as directed up to 4 times daily    furosemide (LASIX) 20 mg tablet Take 2 Tabs by mouth two (2) times a day. Per Dr. Inez Francisco change 2/27/19--updated med list--did not send prescription to the pharmacy    glucagon (GLUCAGON EMERGENCY KIT, HUMAN,) 1 mg injection USE A DIRECTED    carvedilol (COREG) 12.5 mg tablet Take 1/2 tab by mouth daily--Dose change 2/15/19--updated med list--did not send prescription to the pharmacy (Patient taking differently: 6.25 mg. Take 1/2 tab by mouth daily--Dose change 2/15/19--updated med list--did not send prescription to the pharmacy)    esomeprazole (NEXIUM) 40 mg capsule TAKE ONE CAPSULE BY MOUTH DAILY    albuterol (PROVENTIL HFA, VENTOLIN HFA, PROAIR HFA) 90 mcg/actuation inhaler Take 1 Puff by inhalation every four (4) hours as needed for Wheezing.  Insulin Needles, Disposable, (BD ULTRA-FINE MINI PEN NEEDLE) 31 gauge x 3/16\" ndle Use as directed up to 8 times daily    gabapentin (NEURONTIN) 300 mg capsule TAKE TWO CAPSULES BY MOUTH THREE TIMES A DAY    atorvastatin (LIPITOR) 40 mg tablet Take 1 Tab by mouth daily.  nystatin (MYCOSTATIN) 100,000 unit/mL suspension Take 5 mL by mouth four (4) times daily. swish and spit    levonorgestrel (MIRENA) 20 mcg/24 hr (5 years) IUD 1 Device by IntraUTERine route once.  ARIPiprazole (ABILIFY) 30 mg tablet Take 30 mg by mouth daily.  LORazepam (ATIVAN) 1 mg tablet Take 1 mg by mouth three (3) times daily.  fentaNYL (DURAGESIC) 100 mcg/hr PATCH 1 Patch by TransDERmal route every seventy-two (72) hours.  acetaminophen (TYLENOL) 325 mg tablet Take 2 Tabs by mouth every six (6) hours as needed for Fever.  FREESTYLE MARIELA SENSOR kit Use 1 sensor every 10 days as directed     No current facility-administered medications for this visit. Allergies   Allergen Reactions    Zocor [Simvastatin] Myalgia    Lisinopril Cough     Review of Systems:  - Eyes: no blurry vision or double vision  - Cardiovascular: no chest pain  - Respiratory: no shortness of breath  - Musculoskeletal: no myalgias  - Neurological: no numbness/tingling in extremities    Physical Examination:  Blood pressure 132/78, pulse 78, height 5' 5\" (1.651 m), weight 196 lb 3.2 oz (89 kg). - General: pleasant, no distress, good eye contact   - Neck: no carotid bruits  - Cardiovascular: regular, normal rate, nl s1 and s2, no m/r/g,   - Respiratory: clear bilaterally  - Integumentary: no edema,   - Psychiatric: normal mood and affect    Data Reviewed:   Component      Latest Ref Rng & Units 5/21/2019 5/17/2019           9:49 AM  1:02 PM   Sodium      136 - 145 mmol/L  129 (L)   Potassium      3.5 - 5.1 mmol/L  4.6   Chloride      97 - 108 mmol/L  93 (L)   CO2      21 - 32 mmol/L  28   Anion gap      5 - 15 mmol/L  8   Glucose      65 - 100 mg/dL  280 (H)   BUN      6 - 20 MG/DL  19   Creatinine      0.55 - 1.02 MG/DL  1.27 (H)   BUN/Creatinine ratio      12 - 20    15   GFR est AA      >60 ml/min/1.73m2  54 (L)   GFR est non-AA      >60 ml/min/1.73m2  45 (L)   Calcium      8.5 - 10.1 MG/DL  8.9   Bilirubin, total      0.2 - 1.0 MG/DL  0.8   ALT (SGPT)      12 - 78 U/L  19   AST      15 - 37 U/L  25   Alk. 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):  Most recent Hgb A1c was 10.1% in 5/19 up from 10% in 1/19 up from 9.2% in 10/18 up from 9% in 7/18 up from 8.2% in 4/18 down from 8.8% in 11/17 up from 7.4% in 9/17 down from 9.4% in 2/17 up from 9.1% in 11/16 down from 10% in 3/16 up from 9.4% in 12/15 up from 9.2% in 9/15 down from 9.7% in 5/15 down from 10.3% in 1/15 up from 8.9% in 10/14 down from 11.1% in 4/14 up from 9.2% in 1/14 up from 8.6% in March 2013 down from 8.9% in November down from 9.7% in Aug 2012 up from 9.2% in October 2011 up from 8.7% in May down from 9.7% in March up from 9.1% in July 2010 down from 10.2% in January down from 13.7% in October 2009. Her blood sugars are still high due to fluctuation in her sugars so I made adjustments to her target, sensitivity and active insulin time to help bring her sugars down when she is high. She has benefited from the freestyle [place] and I recommend she continue this. - cont pump settings aside from changes below  - check bs 8 times daily due to fluctuating blood sugars  - cont freestyle ellen CGM  - foot exam done 10/18  - microalbumin was 69 in 7/10 up to 392 in March 2011 and 621 in May down to 216 in October 2011 and 37.3 in 4/14 and up to 183 in 12/15 and 300 in 9/16, down to 85 in 4/18  - optho UTD 11/17  - check microalbumin at next visit      2) HTN NOS (401.9): Blood pressure was at goal < 140/90.   - cont coreg 12.5 mg 1/2 tab daily  - cont lasix 20 mg twice daily per Dr. Evy Richards      3) Hyperlipidemia (272.4): Given DM, goal LDL is < 100, non-HDL < 130, and TGs < 150. LDL was 146 in January 2010, down to 124 in July and 93 in March 2011 with taking 5 mg of crestor daily. Her crestor was changed to pravastatin by Dr. Babak Joseph because of cost in Feb 2012. LDL 93 in 8/12 but her HDL was high at 138 due to ETOH intake. LDL down to 39 in 11/12 but up to 102 in 3/13. Off pravastatin at this time and previously was on 10 mg daily.  in 1/14. Up to 167 in 4/14 so started lovastatin 20 mg daily but she couldn't afford this. She has been started on crestor 40 mg daily by crossover clinic and LDL 64 in 10/14 and 55 in 1/15. Was 37 in 3/16 so dose decreased to 20 mg daily and LDL 23 in 2/17.   Was changed to lipitor 1/2 of 80 mg daily when clinic couldn't get crestor any longer and LDL 37 in 9/17. This was stopped during her hospital stay in 11/17 but was restarted by Dr. Jose Bhandari in 12/17 and 42 in 4/18. Up to 94 in 8/18. Down to 79 in 1/19  - cont lipitor 40 mg daily    4) Elevated LFTs (790.6): I told her previously that her LFTs were elevated likely due to ETOH intake so I advised her to cut back on this and her repeat LFTs were normal in 3/13 and 1/14 and 4/14 and 10/14 and 1/15 and 12/15 and 3/16. AST up to 76 in 2/17 but back to normal in 5/17 and 11/17 and 8/18 and 1/19 so will follow this. 5) Borderline abnormal TFT: TSH 2.1 in 12/11 but up to 4.01 in 11/12 and 4.75 in 11/17 during 2 hospital stays. Up to 5.45 in 1/19 with Dr. Jose Bhandari. Repeat TSH 1.24 and FT4 0.95 and TPO ab 21 in 2/19 so held on any treatment.      - check TSH at next visit    Patient Instructions   1) Current settings are as follows:  - basal: 1.5  - Carb ratio: 15  - sensitivity: 20  - target: 12a: 130-150, 6a: 100-130, 10p: 130-150  - active insulin time: 3 hours    2) Give me an update over mychart in 1 week with how this is working as my hope is with more aggressive targets and sensitivity (correction factor) and a lower active insulin time, this will allow you to receive more insulin for high sugars to come back down towards normal.          Follow-up and Dispositions    · Return for 6/20/19 at 2:10pm.               Copy sent to:  Dr. Spence Cheeks Dr. Wallis Epley Dr. Irby Hick via [facility]  Dr. Dedrick Albert

## 2019-05-24 ENCOUNTER — DOCUMENTATION ONLY (OUTPATIENT)
Dept: ENDOCRINOLOGY | Age: 49
End: 2019-05-24

## 2019-05-24 NOTE — TELEPHONE ENCOUNTER
Patient was approved for the gabapentin and has already been notified of the approval by [facility].

## 2019-05-24 NOTE — TELEPHONE ENCOUNTER
I submitted a prior authorization for gabapentin through covermymeds and am currently waiting a response from the system.

## 2019-05-29 ENCOUNTER — TELEPHONE (OUTPATIENT)
Dept: ENDOCRINOLOGY | Age: 49
End: 2019-05-29

## 2019-05-29 NOTE — TELEPHONE ENCOUNTER
Please call pt to let her know she has an unread message in [address] From  Niurka Hazel MD To  Savita Michelle Sent  5/27/2019  9:40 AM   Did you mean to write you were 100 before working out?  If so, have you tried using a temporary basal before workouts to avoid low sugars?  I often recommend using 75-80% of your normal basal rate during the time of workouts to avoid lows.  If you have never done this, you need to go under basal rate setup and make sure the temporary basal type is set for % (not units/hr) and then you can use this to program what % of normal you desire to use and the time for which you want this to run.  I often recommend setting this 30 min before a work out and then cancelling the temporary basal after your workout is done. Emilia Needs me know if this makes sense or not. Previous Messages      ----- Message -----   Martha Pain From: Cody Loya Sent: 5/26/2019  3:16 PM EDT        To: Cisco Bobby MD   Subject: Non-Urgent Medical Question     Dj   I was 100 before working put so i ate. I crashed twice.  Treated then crashed in [place] [employer].  I will let u know how evening goes

## 2019-05-31 NOTE — TELEPHONE ENCOUNTER
I spoke with patient and she stated her sugar crashed to 100 after she worked out. I read the message to her and she stated she will read it again and let Dr. Leonila Mauro know if she has any questions.

## 2019-06-20 ENCOUNTER — OFFICE VISIT (OUTPATIENT)
Dept: ENDOCRINOLOGY | Age: 49
End: 2019-06-20

## 2019-06-20 VITALS
WEIGHT: 209.2 LBS | BODY MASS INDEX: 34.85 KG/M2 | SYSTOLIC BLOOD PRESSURE: 102 MMHG | HEIGHT: 65 IN | DIASTOLIC BLOOD PRESSURE: 62 MMHG | HEART RATE: 77 BPM

## 2019-06-20 DIAGNOSIS — R79.89 ELEVATED LFTS: ICD-10-CM

## 2019-06-20 DIAGNOSIS — I10 ESSENTIAL HYPERTENSION: ICD-10-CM

## 2019-06-20 DIAGNOSIS — E78.5 HYPERLIPIDEMIA LDL GOAL <100: ICD-10-CM

## 2019-06-20 RX ORDER — CARVEDILOL 6.25 MG/1
TABLET ORAL
Status: ON HOLD | COMMUNITY
Start: 2019-06-13 | End: 2019-07-11

## 2019-06-20 RX ORDER — METOLAZONE 5 MG/1
TABLET ORAL
Status: ON HOLD | COMMUNITY
Start: 2019-06-03 | End: 2019-07-11

## 2019-06-20 RX ORDER — LORAZEPAM 2 MG/1
2 TABLET ORAL 3 TIMES DAILY
COMMUNITY
End: 2020-10-06

## 2019-06-20 RX ORDER — LOSARTAN POTASSIUM 25 MG/1
6.25 TABLET ORAL DAILY
COMMUNITY
Start: 2019-05-22 | End: 2019-09-17

## 2019-06-20 NOTE — PATIENT INSTRUCTIONS
1) Current settings are as follows:  - basal: 12a: 1.45, 6a: 1.55, 10p: 1.4  - Carb ratio: 10  - sensitivity: 20  - target: 12a: 130-150, 6a: 100-130, 10p: 130-150  - active insulin time: 3 hours    We made your carb ratio more aggressive going from 15 to 10 to try and help with spikes after eating. Call Kavitha Ahuja today to find out how to connect your meter back to your pump. 2) Keep track of your blood pressure and if you are running under 100 on the top number, let me know.

## 2019-06-20 NOTE — PROGRESS NOTES
Chief Complaint   Patient presents with    Diabetes     pcp and pharmacy confirmed     History of Present Illness: Poly Warren is a 51 y.o. female here for follow up of diabetes. Weight up 13 lbs since last visit in 5/19. Current settings are as follows:  - basal: 12a: 1.45, 6a: 1.55, 10p: 1.4  - Carb ratio: 15   - sensitivity: 20  - target: 12a: 130-150, 6a: 100-130, 10p: 130-150  - active insulin time: 3 hours    she has the following indications to continue treatment with freestyle ellen:  1) she has type 1 diabetes and is on an intensive insulin regimen with an insulin pump  2) she tests her blood sugar 8 times per day and makes treatment decisions off her blood sugar readings and her sensor readings  3) she requires adjustments to her insulin pump setttings based on her sensor readings  4) she has benefitted from therapeutic continuous glucose monitoring and I recommend that she continue this  5) she is seen in my office every 3 months     She was just in [facility] for 5 days and was discharged on 6/13/19 for hyponatremia and fluid overload and her weight was up to 216 lbs. She is now on a 1.5L fluid restriction. She was also given some fluid during her stay due to low blood pressure. She was seen by a heart doctor while in the hospital and he did an ECHO and her heart was pumping normally so the thought is that any fluid shifts are from the kidneys and not from the heart. She is currently on 1/4 of 25 mg tab of losartan and 6.25 mg of coreg per day. She just had labs drawn by Dr. Julian Aleman yesterday that showed her sodium was back to normal at 137 and BUN/Cr were 20/1. 18. Of note, I did explain to her that her labs have showed that she has stage 3 CKD for the past several years but she was not aware of this and became tearful but I told her that if we can stabilize her sugars and blood pressure, my hope is that we can keep her from going onto dialysis in the future.     The only change she made to her pump settings were a new overnight basal of 1.45 at 12a and 1.4 at 10p and her 6a basal is now 1.55. With the changes we made last time, she is now coming down much better after correcting for highs and has multiple times on her meter when her sugar goes from 300 or higher down to 100-130 after correcting. Last night her sugar was 184 at bedtime and she didn't correct and it was 181 this morning which means her overnight basal is likely adequate. She does have spikes after meal likely due to her carb ratio not being correct as she said she often will take 3-5 more units than what the pump recommends. She had to disconnect her pump while in the hospital and ever since then, she can't get it connected to her meter so I spoke with Yuni Nicole who advised me to have patient call her to get this reconnected. She has not been using the freestyle ellen since starting on the pump but I told her that I think she can go back on this but it just doesn't communicate with the pump and she still will need fingersticks to help with boluses. Current Outpatient Medications   Medication Sig    LORazepam (ATIVAN) 2 mg tablet Take 2 mg by mouth three (3) times daily.  citalopram (CELEXA) 20 mg tablet 40 mg.    traZODone (DESYREL) 50 mg tablet     insulin lispro (HUMALOG) 100 unit/mL injection Use as directed for insulin pump. Max 100 units per day--BILL MEDICARE PART B--DX E10.65--REPLACES NOVOLOG    CONTOUR NEXT TEST STRIPS strip Use as directed to test up to 8 x per day,  Uncontrolled Type 1 diabetes with Neuropathy (HCC) E10.40, E10.65    Insulin Syringe-Needle U-100 0.5 mL 31 gauge x 5/16\" syrg Use as directed up to 4 times daily    furosemide (LASIX) 20 mg tablet Take 2 Tabs by mouth two (2) times a day.  Per Dr. Wilbur Haynes change 2/27/19--updated med list--did not send prescription to the pharmacy    glucagon (GLUCAGON EMERGENCY KIT, HUMAN,) 1 mg injection USE A DIRECTED    carvedilol (COREG) 12.5 mg tablet Take 1/2 tab by mouth daily--Dose change 2/15/19--updated med list--did not send prescription to the pharmacy (Patient taking differently: 6.25 mg. Take 1/2 tab by mouth daily--Dose change 2/15/19--updated med list--did not send prescription to the pharmacy)    esomeprazole (NEXIUM) 40 mg capsule TAKE ONE CAPSULE BY MOUTH DAILY    Insulin Needles, Disposable, (BD ULTRA-FINE MINI PEN NEEDLE) 31 gauge x 3/16\" ndle Use as directed up to 8 times daily    atorvastatin (LIPITOR) 40 mg tablet Take 1 Tab by mouth daily.  nystatin (MYCOSTATIN) 100,000 unit/mL suspension Take 5 mL by mouth four (4) times daily. swish and spit    levonorgestrel (MIRENA) 20 mcg/24 hr (5 years) IUD 1 Device by IntraUTERine route once.  ARIPiprazole (ABILIFY) 30 mg tablet Take 30 mg by mouth daily.  fentaNYL (DURAGESIC) 100 mcg/hr PATCH 1 Patch by TransDERmal route every seventy-two (72) hours.  gabapentin (NEURONTIN) 300 mg capsule TAKE TWO CAPSULES BY MOUTH THREE TIMES A DAY    FREESTYLE MARIELA SENSOR kit Use 1 sensor every 10 days as directed     No current facility-administered medications for this visit. Allergies   Allergen Reactions    Zocor [Simvastatin] Myalgia    Lisinopril Cough     Review of Systems:  - Eyes: no blurry vision or double vision  - Cardiovascular: no chest pain  - Respiratory: no shortness of breath  - Musculoskeletal: no myalgias  - Neurological: no numbness/tingling in extremities    Physical Examination:  Blood pressure 102/62, pulse 77, height 5' 5\" (1.651 m), weight 209 lb 3.2 oz (94.9 kg). - General: pleasant, no distress, good eye contact   - Neck: no carotid bruits  - Cardiovascular: regular, normal rate, nl s1 and s2, no m/r/g,   - Respiratory: clear bilaterally  - Integumentary: trace pitting edema,   - Psychiatric: normal mood and affect    Data Reviewed:   - none new for review    Assessment/Plan:     1) 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):  Most recent Hgb A1c was 10.1% in 5/19 up from 10% in 1/19 up from 9.2% in 10/18 up from 9% in 7/18 up from 8.2% in 4/18 down from 8.8% in 11/17 up from 7.4% in 9/17 down from 9.4% in 2/17 up from 9.1% in 11/16 down from 10% in 3/16 up from 9.4% in 12/15 up from 9.2% in 9/15 down from 9.7% in 5/15 down from 10.3% in 1/15 up from 8.9% in 10/14 down from 11.1% in 4/14 up from 9.2% in 1/14 up from 8.6% in March 2013 down from 8.9% in November down from 9.7% in Aug 2012 up from 9.2% in October 2011 up from 8.7% in May down from 9.7% in March up from 9.1% in July 2010 down from 10.2% in January down from 13.7% in October 2009. Her blood sugars are improving but I made her carb ratio more aggressive to help with spikes after eating. She has benefited from the freestyle [place] and I recommend she continue this. - cont pump settings aside from change below  - check bs 8 times daily due to fluctuating blood sugars  - cont freestyle ellen CGM  - foot exam done 10/18  - microalbumin was 69 in 7/10 up to 392 in March 2011 and 621 in May down to 216 in October 2011 and 37.3 in 4/14 and up to 183 in 12/15 and 300 in 9/16, down to 85 in 4/18  - optho UTD 11/17  - check microalbumin at next visit      2) HTN NOS (401.9): Blood pressure was at goal < 140/90 but slightly on the hypotensive side so may end up needing further decrease in coreg.   - cont coreg 6.25 mg daily  - cont losartan 25 mg 1/4 tab daily  - cont lasix 20 mg 2 tabs twice daily per Dr. Palma Restrepo      3) Hyperlipidemia (272.4): Given DM, goal LDL is < 100, non-HDL < 130, and TGs < 150. LDL was 146 in January 2010, down to 124 in July and 93 in March 2011 with taking 5 mg of crestor daily. Her crestor was changed to pravastatin by Dr. Olivia Pretty because of cost in Feb 2012. LDL 93 in 8/12 but her HDL was high at 138 due to ETOH intake. LDL down to 39 in 11/12 but up to 102 in 3/13. Off pravastatin at this time and previously was on 10 mg daily.  in 1/14.   Up to 167 in 4/14 so started lovastatin 20 mg daily but she couldn't afford this. She has been started on crestor 40 mg daily by crossover clinic and LDL 64 in 10/14 and 55 in 1/15. Was 37 in 3/16 so dose decreased to 20 mg daily and LDL 23 in 2/17. Was changed to lipitor 1/2 of 80 mg daily when clinic couldn't get crestor any longer and LDL 37 in 9/17. This was stopped during her hospital stay in 11/17 but was restarted by Dr. Suly Cornelius in 12/17 and 42 in 4/18. Up to 94 in 8/18. Down to 79 in 1/19  - cont lipitor 40 mg daily    4) Elevated LFTs (790.6): I told her previously that her LFTs were elevated likely due to ETOH intake so I advised her to cut back on this and her repeat LFTs were normal in 3/13 and 1/14 and 4/14 and 10/14 and 1/15 and 12/15 and 3/16. AST up to 76 in 2/17 but back to normal in 5/17 and 11/17 and 8/18 and 1/19 so will follow this. 5) Borderline abnormal TFT: TSH 2.1 in 12/11 but up to 4.01 in 11/12 and 4.75 in 11/17 during 2 hospital stays. Up to 5.45 in 1/19 with Dr. Suly Cornelius. Repeat TSH 1.24 and FT4 0.95 and TPO ab 21 in 2/19 so held on any treatment. - check TSH at next visit    We spent 45 minutes of face to face time together and > 50% of the time was spent in counseling regarding management of all the conditions above. Patient Instructions   1) Current settings are as follows:  - basal: 12a: 1.45, 6a: 1.55, 10p: 1.4  - Carb ratio: 10  - sensitivity: 20  - target: 12a: 130-150, 6a: 100-130, 10p: 130-150  - active insulin time: 3 hours    We made your carb ratio more aggressive going from 15 to 10 to try and help with spikes after eating. Call Sonam Garcia today to find out how to connect your meter back to your pump. 2) Keep track of your blood pressure and if you are running under 100 on the top number, let me know.         Follow-up and Dispositions    · Return for 9/17/19 at 9:10am.               Copy sent to:  Eron Cornelius via [facility] care  Dr. Driss Renteria Jose Antonio Hirsch

## 2019-06-28 ENCOUNTER — TELEPHONE (OUTPATIENT)
Dept: ENDOCRINOLOGY | Age: 49
End: 2019-06-28

## 2019-06-28 NOTE — TELEPHONE ENCOUNTER
----- Message from TONY Ye sent at 6/28/2019  4:50 PM EDT -----  Taisha Cardoza,  We couldn't do this successfully over the phone. So I asked Ivan Ok to come by. She is now connected. Namrata Jones    ----- Message -----  From: Ho Edmonds MD  Sent: 6/21/2019   5:26 PM  To: TONY Ye    This is what I read from your note:    Thursday, March 21  1am                   BG 89 per [place] system = panicked so she ate 30 grams of CHO  330am               BG 90 per [place] system = panicked so she again ate 30 grams of CHO  430am               BG 25 per [place] system = panicked so she ate 30 grams  930am                per [place] system = used pump wizard to correct hyperglycemia  10am                 Contacted me. Shared data and recommendations to Kimberly Epperson MD:                                         A) Use fingerstick BG data to validate settings for now                                         B) Reduce basal rate to 1 unit per hour; continue daytime fast      I see where you recommended to use fingerstick BG data to validate settings but not that you told her to stop the [place] too. Is this what you meant when you wrote this? If so, that's fine but I still think she can try to use the ellen to gather data when not checking. Do you think this is reasonable? Were you able to help her get her meter connected back to the pump? Let me know when you have a chance. Thanks,  Taisha Cardoza  ----- Message -----  From: TONY Spring  Sent: 6/21/2019  10:31 AM  To: María Han MD    Dear Taisha Cardoza,  If you would review the follow-up notes to her insulin pump training, you will see that she was \"panicking\" resulting in making rash decisions. I asked her to not do so. Namrata Jones      ----- Message -----  From: Ho Edmonds MD  Sent: 6/20/2019   3:35 PM  To: TONY Ye    To clarify, she told me that you told her to stop using the freestyle ellen when she started her pump. Is that true?   I explained that she can still use the [place] but it doesn't talk to the pump and she would still need to use her contour next meter to sync with her pump to help with boluses and the [place] could give info about what was happening in between fingersticks. I hope she was able to connect with you today about getting her meter connected to her pump. Thanks for all your help with her.     Jay Mead

## 2019-07-10 ENCOUNTER — TELEPHONE (OUTPATIENT)
Dept: ENDOCRINOLOGY | Age: 49
End: 2019-07-10

## 2019-07-10 ENCOUNTER — HOSPITAL ENCOUNTER (INPATIENT)
Age: 49
LOS: 2 days | Discharge: HOME | DRG: 190 | End: 2019-07-12
Attending: EMERGENCY MEDICINE | Admitting: INTERNAL MEDICINE
Payer: MEDICARE

## 2019-07-10 ENCOUNTER — APPOINTMENT (OUTPATIENT)
Dept: CT IMAGING | Age: 49
DRG: 190 | End: 2019-07-10
Attending: EMERGENCY MEDICINE
Payer: MEDICARE

## 2019-07-10 ENCOUNTER — APPOINTMENT (OUTPATIENT)
Dept: GENERAL RADIOLOGY | Age: 49
DRG: 190 | End: 2019-07-10
Attending: EMERGENCY MEDICINE
Payer: MEDICARE

## 2019-07-10 DIAGNOSIS — J01.10 ACUTE NON-RECURRENT FRONTAL SINUSITIS: ICD-10-CM

## 2019-07-10 DIAGNOSIS — J20.9 ACUTE BRONCHITIS, UNSPECIFIED ORGANISM: Primary | ICD-10-CM

## 2019-07-10 DIAGNOSIS — J44.1 ACUTE EXACERBATION OF CHRONIC OBSTRUCTIVE PULMONARY DISEASE (COPD) (HCC): ICD-10-CM

## 2019-07-10 DIAGNOSIS — E87.1 HYPONATREMIA: ICD-10-CM

## 2019-07-10 LAB
ALBUMIN SERPL-MCNC: 4 G/DL (ref 3.5–5)
ALBUMIN/GLOB SERPL: 1.2 {RATIO} (ref 1.1–2.2)
ALP SERPL-CCNC: 104 U/L (ref 45–117)
ALT SERPL-CCNC: 21 U/L (ref 12–78)
ANION GAP SERPL CALC-SCNC: 6 MMOL/L (ref 5–15)
APPEARANCE UR: CLEAR
ARTERIAL PATENCY WRIST A: YES
AST SERPL-CCNC: 16 U/L (ref 15–37)
BACTERIA URNS QL MICRO: NEGATIVE /HPF
BASE EXCESS BLD CALC-SCNC: 3 MMOL/L
BASOPHILS # BLD: 0 K/UL (ref 0–0.1)
BASOPHILS NFR BLD: 0 % (ref 0–1)
BDY SITE: ABNORMAL
BILIRUB SERPL-MCNC: 0.4 MG/DL (ref 0.2–1)
BILIRUB UR QL: NEGATIVE
BUN SERPL-MCNC: 37 MG/DL (ref 6–20)
BUN/CREAT SERPL: 22 (ref 12–20)
CALCIUM SERPL-MCNC: 9 MG/DL (ref 8.5–10.1)
CHLORIDE SERPL-SCNC: 92 MMOL/L (ref 97–108)
CO2 SERPL-SCNC: 32 MMOL/L (ref 21–32)
COLOR UR: NORMAL
CREAT SERPL-MCNC: 1.66 MG/DL (ref 0.55–1.02)
DIFFERENTIAL METHOD BLD: ABNORMAL
EOSINOPHIL # BLD: 0 K/UL (ref 0–0.4)
EOSINOPHIL NFR BLD: 0 % (ref 0–7)
EPITH CASTS URNS QL MICRO: NORMAL /LPF
ERYTHROCYTE [DISTWIDTH] IN BLOOD BY AUTOMATED COUNT: 16.5 % (ref 11.5–14.5)
GAS FLOW.O2 O2 DELIVERY SYS: ABNORMAL L/MIN
GAS FLOW.O2 SETTING OXYMISER: 2 L/M
GLOBULIN SER CALC-MCNC: 3.4 G/DL (ref 2–4)
GLUCOSE BLD STRIP.AUTO-MCNC: 100 MG/DL (ref 65–100)
GLUCOSE BLD STRIP.AUTO-MCNC: 108 MG/DL (ref 65–100)
GLUCOSE BLD STRIP.AUTO-MCNC: 190 MG/DL (ref 65–100)
GLUCOSE BLD STRIP.AUTO-MCNC: 196 MG/DL (ref 65–100)
GLUCOSE BLD STRIP.AUTO-MCNC: 88 MG/DL (ref 65–100)
GLUCOSE SERPL-MCNC: 171 MG/DL (ref 65–100)
GLUCOSE UR STRIP.AUTO-MCNC: NEGATIVE MG/DL
HCO3 BLD-SCNC: 28.6 MMOL/L (ref 22–26)
HCT VFR BLD AUTO: 36.8 % (ref 35–47)
HGB BLD-MCNC: 12.6 G/DL (ref 11.5–16)
HGB UR QL STRIP: NEGATIVE
HYALINE CASTS URNS QL MICRO: NORMAL /LPF (ref 0–5)
IMM GRANULOCYTES # BLD AUTO: 0 K/UL (ref 0–0.04)
IMM GRANULOCYTES NFR BLD AUTO: 0 % (ref 0–0.5)
KETONES UR QL STRIP.AUTO: NEGATIVE MG/DL
LEUKOCYTE ESTERASE UR QL STRIP.AUTO: NEGATIVE
LYMPHOCYTES # BLD: 3.1 K/UL (ref 0.8–3.5)
LYMPHOCYTES NFR BLD: 35 % (ref 12–49)
MCH RBC QN AUTO: 29 PG (ref 26–34)
MCHC RBC AUTO-ENTMCNC: 34.2 G/DL (ref 30–36.5)
MCV RBC AUTO: 84.8 FL (ref 80–99)
MONOCYTES # BLD: 0.7 K/UL (ref 0–1)
MONOCYTES NFR BLD: 8 % (ref 5–13)
NEUTS SEG # BLD: 5 K/UL (ref 1.8–8)
NEUTS SEG NFR BLD: 57 % (ref 32–75)
NITRITE UR QL STRIP.AUTO: NEGATIVE
NRBC # BLD: 0 K/UL (ref 0–0.01)
NRBC BLD-RTO: 0 PER 100 WBC
PCO2 BLD: 49.5 MMHG (ref 35–45)
PH BLD: 7.37 [PH] (ref 7.35–7.45)
PH UR STRIP: 6 [PH] (ref 5–8)
PLATELET # BLD AUTO: 321 K/UL (ref 150–400)
PMV BLD AUTO: 9.2 FL (ref 8.9–12.9)
PO2 BLD: 69 MMHG (ref 80–100)
POTASSIUM SERPL-SCNC: 3.8 MMOL/L (ref 3.5–5.1)
PROT SERPL-MCNC: 7.4 G/DL (ref 6.4–8.2)
PROT UR STRIP-MCNC: NEGATIVE MG/DL
RBC # BLD AUTO: 4.34 M/UL (ref 3.8–5.2)
RBC #/AREA URNS HPF: NORMAL /HPF (ref 0–5)
SAO2 % BLD: 93 % (ref 92–97)
SERVICE CMNT-IMP: ABNORMAL
SERVICE CMNT-IMP: NORMAL
SERVICE CMNT-IMP: NORMAL
SODIUM SERPL-SCNC: 130 MMOL/L (ref 136–145)
SP GR UR REFRACTOMETRY: 1.01 (ref 1–1.03)
SPECIMEN TYPE: ABNORMAL
TOTAL RESP. RATE, ITRR: 18
UA: UC IF INDICATED,UAUC: NORMAL
UROBILINOGEN UR QL STRIP.AUTO: 0.2 EU/DL (ref 0.2–1)
WBC # BLD AUTO: 8.8 K/UL (ref 3.6–11)
WBC URNS QL MICRO: NORMAL /HPF (ref 0–4)

## 2019-07-10 PROCEDURE — 82803 BLOOD GASES ANY COMBINATION: CPT

## 2019-07-10 PROCEDURE — 65270000029 HC RM PRIVATE

## 2019-07-10 PROCEDURE — 71045 X-RAY EXAM CHEST 1 VIEW: CPT

## 2019-07-10 PROCEDURE — 99285 EMERGENCY DEPT VISIT HI MDM: CPT

## 2019-07-10 PROCEDURE — 77030029684 HC NEB SM VOL KT MONA -A

## 2019-07-10 PROCEDURE — 36415 COLL VENOUS BLD VENIPUNCTURE: CPT

## 2019-07-10 PROCEDURE — 82962 GLUCOSE BLOOD TEST: CPT

## 2019-07-10 PROCEDURE — 74011000250 HC RX REV CODE- 250: Performed by: EMERGENCY MEDICINE

## 2019-07-10 PROCEDURE — 65660000000 HC RM CCU STEPDOWN

## 2019-07-10 PROCEDURE — 74011250637 HC RX REV CODE- 250/637: Performed by: EMERGENCY MEDICINE

## 2019-07-10 PROCEDURE — 94640 AIRWAY INHALATION TREATMENT: CPT

## 2019-07-10 PROCEDURE — 96374 THER/PROPH/DIAG INJ IV PUSH: CPT

## 2019-07-10 PROCEDURE — 85025 COMPLETE CBC W/AUTO DIFF WBC: CPT

## 2019-07-10 PROCEDURE — 71250 CT THORAX DX C-: CPT

## 2019-07-10 PROCEDURE — 81001 URINALYSIS AUTO W/SCOPE: CPT

## 2019-07-10 PROCEDURE — 80053 COMPREHEN METABOLIC PANEL: CPT

## 2019-07-10 PROCEDURE — 96361 HYDRATE IV INFUSION ADD-ON: CPT

## 2019-07-10 PROCEDURE — 74011250636 HC RX REV CODE- 250/636: Performed by: EMERGENCY MEDICINE

## 2019-07-10 PROCEDURE — 36600 WITHDRAWAL OF ARTERIAL BLOOD: CPT

## 2019-07-10 RX ORDER — BENZONATATE 100 MG/1
100 CAPSULE ORAL
Qty: 30 CAP | Refills: 0 | Status: SHIPPED | OUTPATIENT
Start: 2019-07-10 | End: 2019-07-11

## 2019-07-10 RX ORDER — ACETAMINOPHEN 325 MG/1
650 TABLET ORAL
Status: DISCONTINUED | OUTPATIENT
Start: 2019-07-10 | End: 2019-07-12 | Stop reason: HOSPADM

## 2019-07-10 RX ORDER — IPRATROPIUM BROMIDE AND ALBUTEROL SULFATE 2.5; .5 MG/3ML; MG/3ML
3 SOLUTION RESPIRATORY (INHALATION)
Status: COMPLETED | OUTPATIENT
Start: 2019-07-10 | End: 2019-07-10

## 2019-07-10 RX ORDER — AMOXICILLIN AND CLAVULANATE POTASSIUM 875; 125 MG/1; MG/1
1 TABLET, FILM COATED ORAL 2 TIMES DAILY
Qty: 20 TAB | Refills: 0 | Status: SHIPPED | OUTPATIENT
Start: 2019-07-10 | End: 2019-07-11 | Stop reason: SDUPTHER

## 2019-07-10 RX ORDER — ONDANSETRON 2 MG/ML
4 INJECTION INTRAMUSCULAR; INTRAVENOUS
Status: DISCONTINUED | OUTPATIENT
Start: 2019-07-10 | End: 2019-07-12 | Stop reason: HOSPADM

## 2019-07-10 RX ORDER — ONDANSETRON 2 MG/ML
4 INJECTION INTRAMUSCULAR; INTRAVENOUS
Status: COMPLETED | OUTPATIENT
Start: 2019-07-10 | End: 2019-07-10

## 2019-07-10 RX ORDER — HYDROCODONE BITARTRATE AND ACETAMINOPHEN 5; 325 MG/1; MG/1
1 TABLET ORAL
Status: COMPLETED | OUTPATIENT
Start: 2019-07-10 | End: 2019-07-10

## 2019-07-10 RX ORDER — OXYCODONE AND ACETAMINOPHEN 5; 325 MG/1; MG/1
1 TABLET ORAL
Qty: 10 TAB | Refills: 0 | Status: SHIPPED | OUTPATIENT
Start: 2019-07-10 | End: 2019-07-13

## 2019-07-10 RX ORDER — CYCLOBENZAPRINE HCL 10 MG
10 TABLET ORAL
Status: COMPLETED | OUTPATIENT
Start: 2019-07-10 | End: 2019-07-10

## 2019-07-10 RX ADMIN — IPRATROPIUM BROMIDE AND ALBUTEROL SULFATE 3 ML: .5; 3 SOLUTION RESPIRATORY (INHALATION) at 19:33

## 2019-07-10 RX ADMIN — CYCLOBENZAPRINE HYDROCHLORIDE 10 MG: 10 TABLET, FILM COATED ORAL at 18:37

## 2019-07-10 RX ADMIN — IPRATROPIUM BROMIDE AND ALBUTEROL SULFATE 3 ML: .5; 3 SOLUTION RESPIRATORY (INHALATION) at 18:37

## 2019-07-10 RX ADMIN — SODIUM CHLORIDE 1000 ML: 900 INJECTION, SOLUTION INTRAVENOUS at 19:33

## 2019-07-10 RX ADMIN — HYDROCODONE BITARTRATE AND ACETAMINOPHEN 1 TABLET: 5; 325 TABLET ORAL at 18:37

## 2019-07-10 RX ADMIN — SODIUM CHLORIDE 500 ML: 900 INJECTION, SOLUTION INTRAVENOUS at 18:37

## 2019-07-10 RX ADMIN — ONDANSETRON 4 MG: 2 INJECTION INTRAMUSCULAR; INTRAVENOUS at 18:37

## 2019-07-10 NOTE — TELEPHONE ENCOUNTER
Called, spoke with Pt. Two pt identifiers confirmed. Pt informed per Dr. Ann Anderson She should change her site immediately and give a manual dose of her humalog 14 units to bring down her sugar and repeat in 2 hours. If her sugar is still over 500, she will need to go to the ER to be evaluated. Pt states she's done that over the course of 2 days and still high blood sugars and sometimes, she's not getting a reading. Pt informed to go to the ER for evaluation as recommended by Dr. Ann Anderson. Pt states she's going to the ER. Pt verbalized understanding of information discussed w/ no further questions at this time.

## 2019-07-10 NOTE — TELEPHONE ENCOUNTER
Call her to find out if she is having any trouble with her pump site. She should change her site immediately and give a manual dose of her humalog 14 units to bring down her sugar and repeat in 2 hours. If her sugar is still over 500, she will need to go to the ER to be evaluated. If it's coming down under 500, then she can treat herself at home with another bolus through the pump.

## 2019-07-11 LAB
ANION GAP SERPL CALC-SCNC: 7 MMOL/L (ref 5–15)
BASOPHILS # BLD: 0 K/UL (ref 0–0.1)
BASOPHILS NFR BLD: 1 % (ref 0–1)
BUN SERPL-MCNC: 30 MG/DL (ref 6–20)
BUN/CREAT SERPL: 23 (ref 12–20)
CALCIUM SERPL-MCNC: 8.5 MG/DL (ref 8.5–10.1)
CHLORIDE SERPL-SCNC: 92 MMOL/L (ref 97–108)
CO2 SERPL-SCNC: 31 MMOL/L (ref 21–32)
CREAT SERPL-MCNC: 1.33 MG/DL (ref 0.55–1.02)
DIFFERENTIAL METHOD BLD: ABNORMAL
EOSINOPHIL # BLD: 0 K/UL (ref 0–0.4)
EOSINOPHIL NFR BLD: 0 % (ref 0–7)
ERYTHROCYTE [DISTWIDTH] IN BLOOD BY AUTOMATED COUNT: 16.4 % (ref 11.5–14.5)
EST. AVERAGE GLUCOSE BLD GHB EST-MCNC: 220 MG/DL
GLUCOSE BLD STRIP.AUTO-MCNC: 121 MG/DL (ref 65–100)
GLUCOSE BLD STRIP.AUTO-MCNC: 388 MG/DL (ref 65–100)
GLUCOSE BLD STRIP.AUTO-MCNC: 433 MG/DL (ref 65–100)
GLUCOSE BLD STRIP.AUTO-MCNC: 477 MG/DL (ref 65–100)
GLUCOSE SERPL-MCNC: 135 MG/DL (ref 65–100)
HBA1C MFR BLD: 9.3 % (ref 4.2–6.3)
HCT VFR BLD AUTO: 35.7 % (ref 35–47)
HGB BLD-MCNC: 12.2 G/DL (ref 11.5–16)
IMM GRANULOCYTES # BLD AUTO: 0 K/UL (ref 0–0.04)
IMM GRANULOCYTES NFR BLD AUTO: 0 % (ref 0–0.5)
LYMPHOCYTES # BLD: 3 K/UL (ref 0.8–3.5)
LYMPHOCYTES NFR BLD: 46 % (ref 12–49)
MCH RBC QN AUTO: 29.1 PG (ref 26–34)
MCHC RBC AUTO-ENTMCNC: 34.2 G/DL (ref 30–36.5)
MCV RBC AUTO: 85.2 FL (ref 80–99)
MONOCYTES # BLD: 0.5 K/UL (ref 0–1)
MONOCYTES NFR BLD: 8 % (ref 5–13)
NEUTS SEG # BLD: 2.9 K/UL (ref 1.8–8)
NEUTS SEG NFR BLD: 45 % (ref 32–75)
NRBC # BLD: 0 K/UL (ref 0–0.01)
NRBC BLD-RTO: 0 PER 100 WBC
PLATELET # BLD AUTO: 260 K/UL (ref 150–400)
PMV BLD AUTO: 9.1 FL (ref 8.9–12.9)
POTASSIUM SERPL-SCNC: 3.8 MMOL/L (ref 3.5–5.1)
RBC # BLD AUTO: 4.19 M/UL (ref 3.8–5.2)
SERVICE CMNT-IMP: ABNORMAL
SODIUM SERPL-SCNC: 130 MMOL/L (ref 136–145)
WBC # BLD AUTO: 6.5 K/UL (ref 3.6–11)

## 2019-07-11 PROCEDURE — 74011636637 HC RX REV CODE- 636/637: Performed by: HOSPITALIST

## 2019-07-11 PROCEDURE — 36415 COLL VENOUS BLD VENIPUNCTURE: CPT

## 2019-07-11 PROCEDURE — 74011250636 HC RX REV CODE- 250/636: Performed by: INTERNAL MEDICINE

## 2019-07-11 PROCEDURE — 77010033678 HC OXYGEN DAILY

## 2019-07-11 PROCEDURE — 65660000000 HC RM CCU STEPDOWN

## 2019-07-11 PROCEDURE — 82962 GLUCOSE BLOOD TEST: CPT

## 2019-07-11 PROCEDURE — 94640 AIRWAY INHALATION TREATMENT: CPT

## 2019-07-11 PROCEDURE — 74011636637 HC RX REV CODE- 636/637: Performed by: INTERNAL MEDICINE

## 2019-07-11 PROCEDURE — 97116 GAIT TRAINING THERAPY: CPT | Performed by: PHYSICAL THERAPIST

## 2019-07-11 PROCEDURE — 74011000250 HC RX REV CODE- 250: Performed by: INTERNAL MEDICINE

## 2019-07-11 PROCEDURE — 80048 BASIC METABOLIC PNL TOTAL CA: CPT

## 2019-07-11 PROCEDURE — 74011000258 HC RX REV CODE- 258: Performed by: INTERNAL MEDICINE

## 2019-07-11 PROCEDURE — 74011000250 HC RX REV CODE- 250: Performed by: HOSPITALIST

## 2019-07-11 PROCEDURE — 94760 N-INVAS EAR/PLS OXIMETRY 1: CPT

## 2019-07-11 PROCEDURE — 74011250637 HC RX REV CODE- 250/637: Performed by: INTERNAL MEDICINE

## 2019-07-11 PROCEDURE — 97161 PT EVAL LOW COMPLEX 20 MIN: CPT | Performed by: PHYSICAL THERAPIST

## 2019-07-11 PROCEDURE — 74011250637 HC RX REV CODE- 250/637: Performed by: HOSPITALIST

## 2019-07-11 PROCEDURE — 85025 COMPLETE CBC W/AUTO DIFF WBC: CPT

## 2019-07-11 PROCEDURE — 83036 HEMOGLOBIN GLYCOSYLATED A1C: CPT

## 2019-07-11 RX ORDER — IPRATROPIUM BROMIDE AND ALBUTEROL SULFATE 2.5; .5 MG/3ML; MG/3ML
3 SOLUTION RESPIRATORY (INHALATION)
Status: DISCONTINUED | OUTPATIENT
Start: 2019-07-11 | End: 2019-07-11

## 2019-07-11 RX ORDER — FLUTICASONE FUROATE AND VILANTEROL 100; 25 UG/1; UG/1
1 POWDER RESPIRATORY (INHALATION) DAILY
Status: DISCONTINUED | OUTPATIENT
Start: 2019-07-12 | End: 2019-07-11

## 2019-07-11 RX ORDER — PANTOPRAZOLE SODIUM 40 MG/1
40 TABLET, DELAYED RELEASE ORAL DAILY
Status: DISCONTINUED | OUTPATIENT
Start: 2019-07-12 | End: 2019-07-12 | Stop reason: HOSPADM

## 2019-07-11 RX ORDER — ALBUTEROL SULFATE 90 UG/1
1-2 AEROSOL, METERED RESPIRATORY (INHALATION)
Qty: 1 INHALER | Refills: 0 | Status: SHIPPED
Start: 2019-07-11 | End: 2019-07-12 | Stop reason: SDUPTHER

## 2019-07-11 RX ORDER — SODIUM CHLORIDE 0.9 % (FLUSH) 0.9 %
5-40 SYRINGE (ML) INJECTION EVERY 8 HOURS
Status: DISCONTINUED | OUTPATIENT
Start: 2019-07-11 | End: 2019-07-12 | Stop reason: HOSPADM

## 2019-07-11 RX ORDER — LORAZEPAM 1 MG/1
2 TABLET ORAL 4 TIMES DAILY
Status: DISCONTINUED | OUTPATIENT
Start: 2019-07-11 | End: 2019-07-12 | Stop reason: HOSPADM

## 2019-07-11 RX ORDER — INSULIN LISPRO 100 [IU]/ML
INJECTION, SOLUTION INTRAVENOUS; SUBCUTANEOUS
Status: DISCONTINUED | OUTPATIENT
Start: 2019-07-11 | End: 2019-07-11

## 2019-07-11 RX ORDER — CEFTRIAXONE 1 G/1
INJECTION, POWDER, FOR SOLUTION INTRAMUSCULAR; INTRAVENOUS
Status: DISPENSED
Start: 2019-07-11 | End: 2019-07-11

## 2019-07-11 RX ORDER — MAGNESIUM SULFATE 100 %
4 CRYSTALS MISCELLANEOUS AS NEEDED
Status: DISCONTINUED | OUTPATIENT
Start: 2019-07-11 | End: 2019-07-12 | Stop reason: HOSPADM

## 2019-07-11 RX ORDER — DEXTROSE MONOHYDRATE 100 MG/ML
125-250 INJECTION, SOLUTION INTRAVENOUS AS NEEDED
Status: DISCONTINUED | OUTPATIENT
Start: 2019-07-11 | End: 2019-07-12 | Stop reason: HOSPADM

## 2019-07-11 RX ORDER — CARVEDILOL 6.25 MG/1
6.25 TABLET ORAL DAILY
Status: DISCONTINUED | OUTPATIENT
Start: 2019-07-11 | End: 2019-07-12 | Stop reason: HOSPADM

## 2019-07-11 RX ORDER — FENTANYL 100 UG/H
1 PATCH TRANSDERMAL
Status: DISCONTINUED | OUTPATIENT
Start: 2019-07-11 | End: 2019-07-12 | Stop reason: HOSPADM

## 2019-07-11 RX ORDER — SODIUM CHLORIDE 0.9 % (FLUSH) 0.9 %
5-40 SYRINGE (ML) INJECTION AS NEEDED
Status: DISCONTINUED | OUTPATIENT
Start: 2019-07-11 | End: 2019-07-12 | Stop reason: HOSPADM

## 2019-07-11 RX ORDER — INSULIN LISPRO 100 [IU]/ML
300 INJECTION, SOLUTION INTRAVENOUS; SUBCUTANEOUS
Status: COMPLETED | OUTPATIENT
Start: 2019-07-11 | End: 2019-07-11

## 2019-07-11 RX ORDER — SODIUM CHLORIDE 900 MG/100ML
INJECTION INTRAVENOUS
Status: DISPENSED
Start: 2019-07-11 | End: 2019-07-11

## 2019-07-11 RX ORDER — ARIPIPRAZOLE 5 MG/1
30 TABLET ORAL DAILY
Status: DISCONTINUED | OUTPATIENT
Start: 2019-07-11 | End: 2019-07-12 | Stop reason: HOSPADM

## 2019-07-11 RX ORDER — IBUPROFEN 200 MG
1 TABLET ORAL DAILY
Status: DISCONTINUED | OUTPATIENT
Start: 2019-07-11 | End: 2019-07-12 | Stop reason: HOSPADM

## 2019-07-11 RX ORDER — AMOXICILLIN AND CLAVULANATE POTASSIUM 875; 125 MG/1; MG/1
1 TABLET, FILM COATED ORAL EVERY 12 HOURS
Qty: 8 TAB | Refills: 0 | Status: SHIPPED | OUTPATIENT
Start: 2019-07-11 | End: 2019-07-15

## 2019-07-11 RX ORDER — ENOXAPARIN SODIUM 100 MG/ML
40 INJECTION SUBCUTANEOUS EVERY 24 HOURS
Status: DISCONTINUED | OUTPATIENT
Start: 2019-07-11 | End: 2019-07-12 | Stop reason: HOSPADM

## 2019-07-11 RX ORDER — IPRATROPIUM BROMIDE AND ALBUTEROL SULFATE 2.5; .5 MG/3ML; MG/3ML
3 SOLUTION RESPIRATORY (INHALATION)
Status: DISCONTINUED | OUTPATIENT
Start: 2019-07-11 | End: 2019-07-12 | Stop reason: HOSPADM

## 2019-07-11 RX ORDER — LOSARTAN POTASSIUM 25 MG/1
12.5 TABLET ORAL DAILY
Status: DISCONTINUED | OUTPATIENT
Start: 2019-07-12 | End: 2019-07-12 | Stop reason: HOSPADM

## 2019-07-11 RX ORDER — HYDROCODONE BITARTRATE AND ACETAMINOPHEN 10; 325 MG/1; MG/1
1 TABLET ORAL
COMMUNITY
End: 2021-01-12

## 2019-07-11 RX ORDER — CITALOPRAM 20 MG/1
40 TABLET, FILM COATED ORAL DAILY
Status: DISCONTINUED | OUTPATIENT
Start: 2019-07-11 | End: 2019-07-12 | Stop reason: HOSPADM

## 2019-07-11 RX ORDER — FUROSEMIDE 20 MG/1
40 TABLET ORAL DAILY
COMMUNITY
End: 2019-08-30 | Stop reason: SDUPTHER

## 2019-07-11 RX ORDER — SODIUM CHLORIDE 9 MG/ML
75 INJECTION, SOLUTION INTRAVENOUS CONTINUOUS
Status: DISCONTINUED | OUTPATIENT
Start: 2019-07-11 | End: 2019-07-12 | Stop reason: HOSPADM

## 2019-07-11 RX ORDER — HYDROCODONE BITARTRATE AND ACETAMINOPHEN 5; 325 MG/1; MG/1
1 TABLET ORAL
Status: DISCONTINUED | OUTPATIENT
Start: 2019-07-11 | End: 2019-07-12 | Stop reason: HOSPADM

## 2019-07-11 RX ORDER — AMOXICILLIN AND CLAVULANATE POTASSIUM 875; 125 MG/1; MG/1
1 TABLET, FILM COATED ORAL EVERY 12 HOURS
Status: DISCONTINUED | OUTPATIENT
Start: 2019-07-11 | End: 2019-07-12 | Stop reason: HOSPADM

## 2019-07-11 RX ORDER — AZITHROMYCIN 100 MG/ML
INJECTION, POWDER, LYOPHILIZED, FOR SOLUTION INTRAVENOUS
Status: DISPENSED
Start: 2019-07-11 | End: 2019-07-11

## 2019-07-11 RX ORDER — TRAZODONE HYDROCHLORIDE 100 MG/1
100 TABLET ORAL
Status: DISCONTINUED | OUTPATIENT
Start: 2019-07-11 | End: 2019-07-12 | Stop reason: HOSPADM

## 2019-07-11 RX ORDER — BISACODYL 5 MG
5 TABLET, DELAYED RELEASE (ENTERIC COATED) ORAL DAILY PRN
Status: DISCONTINUED | OUTPATIENT
Start: 2019-07-11 | End: 2019-07-12 | Stop reason: HOSPADM

## 2019-07-11 RX ORDER — GABAPENTIN 300 MG/1
300 CAPSULE ORAL 3 TIMES DAILY
Status: DISCONTINUED | OUTPATIENT
Start: 2019-07-11 | End: 2019-07-12 | Stop reason: HOSPADM

## 2019-07-11 RX ORDER — CARVEDILOL 25 MG/1
6.25 TABLET ORAL DAILY
COMMUNITY
End: 2019-08-30 | Stop reason: SDUPTHER

## 2019-07-11 RX ADMIN — INSULIN LISPRO 300 UNITS: 100 INJECTION, SOLUTION INTRAVENOUS; SUBCUTANEOUS at 12:26

## 2019-07-11 RX ADMIN — Medication 10 ML: at 02:05

## 2019-07-11 RX ADMIN — TRAZODONE HYDROCHLORIDE 100 MG: 100 TABLET ORAL at 20:57

## 2019-07-11 RX ADMIN — LORAZEPAM 2 MG: 1 TABLET ORAL at 21:40

## 2019-07-11 RX ADMIN — IPRATROPIUM BROMIDE AND ALBUTEROL SULFATE 3 ML: .5; 3 SOLUTION RESPIRATORY (INHALATION) at 02:02

## 2019-07-11 RX ADMIN — SODIUM CHLORIDE 75 ML/HR: 900 INJECTION, SOLUTION INTRAVENOUS at 02:02

## 2019-07-11 RX ADMIN — Medication 10 ML: at 06:29

## 2019-07-11 RX ADMIN — INSULIN LISPRO 10 UNITS: 100 INJECTION, SOLUTION INTRAVENOUS; SUBCUTANEOUS at 09:12

## 2019-07-11 RX ADMIN — IPRATROPIUM BROMIDE AND ALBUTEROL SULFATE 3 ML: .5; 3 SOLUTION RESPIRATORY (INHALATION) at 14:36

## 2019-07-11 RX ADMIN — CARVEDILOL 6.25 MG: 6.25 TABLET, FILM COATED ORAL at 18:00

## 2019-07-11 RX ADMIN — HYDROCODONE BITARTRATE AND ACETAMINOPHEN 1 TABLET: 5; 325 TABLET ORAL at 18:16

## 2019-07-11 RX ADMIN — HYDROCODONE BITARTRATE AND ACETAMINOPHEN 1 TABLET: 5; 325 TABLET ORAL at 14:10

## 2019-07-11 RX ADMIN — AZITHROMYCIN MONOHYDRATE 500 MG: 500 INJECTION, POWDER, LYOPHILIZED, FOR SOLUTION INTRAVENOUS at 02:47

## 2019-07-11 RX ADMIN — GABAPENTIN 300 MG: 300 CAPSULE ORAL at 20:56

## 2019-07-11 RX ADMIN — ENOXAPARIN SODIUM 40 MG: 40 INJECTION SUBCUTANEOUS at 06:28

## 2019-07-11 RX ADMIN — HUMAN INSULIN 14 UNITS: 100 INJECTION, SUSPENSION SUBCUTANEOUS at 14:10

## 2019-07-11 RX ADMIN — HYDROCODONE BITARTRATE AND ACETAMINOPHEN 1 TABLET: 5; 325 TABLET ORAL at 09:43

## 2019-07-11 RX ADMIN — IPRATROPIUM BROMIDE AND ALBUTEROL SULFATE 3 ML: .5; 3 SOLUTION RESPIRATORY (INHALATION) at 05:45

## 2019-07-11 RX ADMIN — Medication 10 ML: at 16:18

## 2019-07-11 RX ADMIN — CEFTRIAXONE 1 G: 1 INJECTION, POWDER, FOR SOLUTION INTRAMUSCULAR; INTRAVENOUS at 02:02

## 2019-07-11 RX ADMIN — HUMAN INSULIN 10 UNITS: 100 INJECTION, SUSPENSION SUBCUTANEOUS at 12:26

## 2019-07-11 RX ADMIN — Medication 10 ML: at 20:58

## 2019-07-11 RX ADMIN — IPRATROPIUM BROMIDE AND ALBUTEROL SULFATE 3 ML: .5; 3 SOLUTION RESPIRATORY (INHALATION) at 20:19

## 2019-07-11 RX ADMIN — ARIPIPRAZOLE 30 MG: 5 TABLET ORAL at 18:16

## 2019-07-11 RX ADMIN — AMOXICILLIN AND CLAVULANATE POTASSIUM 1 TABLET: 875; 125 TABLET, FILM COATED ORAL at 20:36

## 2019-07-11 RX ADMIN — METHYLPREDNISOLONE SODIUM SUCCINATE 20 MG: 40 INJECTION, POWDER, FOR SOLUTION INTRAMUSCULAR; INTRAVENOUS at 06:28

## 2019-07-11 RX ADMIN — CITALOPRAM HYDROBROMIDE 40 MG: 20 TABLET ORAL at 18:00

## 2019-07-11 RX ADMIN — INSULIN LISPRO 10 UNITS: 100 INJECTION, SOLUTION INTRAVENOUS; SUBCUTANEOUS at 11:43

## 2019-07-11 RX ADMIN — SODIUM CHLORIDE 75 ML/HR: 900 INJECTION, SOLUTION INTRAVENOUS at 19:33

## 2019-07-11 RX ADMIN — IPRATROPIUM BROMIDE AND ALBUTEROL SULFATE 3 ML: .5; 3 SOLUTION RESPIRATORY (INHALATION) at 08:35

## 2019-07-11 RX ADMIN — LORAZEPAM 2 MG: 1 TABLET ORAL at 17:59

## 2019-07-11 NOTE — ED NOTES
Patient continues to request medication for pain. States \"now that my oxygen is better can I have more pain medication; ask the magic man if I can have some more medication. \"  Dr. Darell Rawls notified.

## 2019-07-11 NOTE — PROGRESS NOTES
Bedside shift change report GIVEN TO Wilbert Delacruz RN. Report included the following information SBAR. SIGNIFICANT CHANGES DURING SHIFT:    0700  Report received from Rashel Johnson.  Pt connects self to personal insulin pump  1617   Pt blood glucose 388, pt self administered 12.9 units via personal insulin pump         CONCERNS TO ADDRESS WITH MD:             [facility] NURSING NOTE   Admission Date 7/10/2019   Admission Diagnosis COPD exacerbation ([address] [J44.1]   Consults IP CONSULT TO HOSPITALIST      Cardiac Monitoring [x] Yes [] No      Purposeful Hourly Rounding [x] Yes    Deborah Score Total Score: 3   Deborah score 3 or > [x] Bed Alarm [] Avasys [] 1:1 sitter [] Patient refused (Signed refusal form in chart)   Joey Score Joey Score: 20   Joey score 14 or < [] PMT consult [] Wound Care consult    []  Specialty bed  [] Nutrition consult      Influenza Vaccine Received Flu Vaccine for Current Season (usually Sept-March): Not Flu Season           Oxygen needs? [x] Room air Oxygen @  []1L    []2L    []3L   []4L    []5L   []6L via  NC   Chronic home O2 use? [] Yes [x] No  Perform O2 challenge test and document in progress note using [employer])      Last bowel movement        Urinary Catheter             LDAs               Peripheral IV 07/10/19 Left Antecubital (Active)   Site Assessment Clean, dry, & intact 7/11/2019  6:13 AM   Phlebitis Assessment 0 7/11/2019  6:13 AM   Infiltration Assessment 0 7/11/2019  6:13 AM   Dressing Status Clean, dry, & intact 7/11/2019  6:13 AM   Dressing Type Transparent 7/11/2019  6:13 AM   Hub Color/Line Status Pink; Infusing 7/11/2019  6:13 AM   Action Taken Blood drawn 7/10/2019  8:44 PM                         Readmission Risk Assessment Tool Score Medium Risk            18       Total Score        3 Has Seen PCP in Last 6 Months (Yes=3, No=0)    2 . Living with Significant Other. Assisted Living. LTAC. SNF. or   Rehab    5 Pt.  Coverage (Medicare=5 , Medicaid, or Self-Pay=4)    8 Charlson Comorbidity Score (Age + Comorbid Conditions)        Criteria that do not apply:    Patient Length of Stay (>5 days = 3)    IP Visits Last 12 Months (1-3=4, 4=9, >4=11)       Expected Length of Stay - - -   Actual Length of Stay 1

## 2019-07-11 NOTE — ED NOTES
Patient's oxygen saturations noted to drop to 82-85% at rest on room air. Dr. Iveth Daniels notified and patient given another nebulizer treatment. Despite 2nd nebulizer oxygen saturations continue to drop below 87% on room air at times as low as 82% with a good pleth. Ambulated with pulse oximeter per Dr. Hieu Scott request and patient became lightheaded and pulse oximeter dropped to 71% on room air. Patient assisted back to bed and placed on 2 liters per nasal cannula. Dr. Iveth Daniels notified. CTA chest and arterial blood gases ordered. Respiratory therapy notified.

## 2019-07-11 NOTE — PROGRESS NOTES
Problem: Falls - Risk of  Goal: *Absence of Falls  Description  Document Savanah Olivares Fall Risk and appropriate interventions in the flowsheet.   Outcome: Progressing Towards Goal

## 2019-07-11 NOTE — PROGRESS NOTES
Bedside report given to [facility] RN, that included SBAR, recent results, and cardiac rhythm.

## 2019-07-11 NOTE — PROGRESS NOTES
Attempted to call ED for report, called ext [phone] listed for ED bed 22, no answer. Called ED charge phone, line busy.

## 2019-07-11 NOTE — PROGRESS NOTES
Pharmacy Medication Reconciliation      The patient was interviewed regarding current PTA medication list, use and drug allergies. The patient was questioned regarding use of any other inhalers, topical products, over the counter medications, herbal medications, vitamin products or ophthalmic/nasal/otic medication use. Allergy Update: Zocor [simvastatin] and Lisinopril     Source: Patient, [address] [place] [address], Rx Query     Recommendations/Findings: The following amendments were made to the patient's active medication list on file at [facility]:   1) Additions:   Hydrocodone-Acetaminophen  mg     2) Deletions:  Metolazone 5 mg  Nystatin 100,000 unit/mL      3) Changes:   Furosemide 20 mg (Old: 2 tabs BID / New: 3 tabs in the morning and 2 tabs in the afternoon. Trazodone 50 mg (Old: no sig / New: 2 tabs at bedtime)  Lorazepam 2 mg (Old: 1 tab TID / New: 1 tab four times a day)  Citalopram 40 mg (Old: no sig / New: 2 tabs every day)   Losartan 25 mg (Old: 25 mg (1/4) tablet every day / New: Take 6.25 mg every day)     4) Pertinent Pharmacy Findings:   - Patient says she has Carvedilol 25 mg tablets at home but splits them into quarters and takes 6.25 mg once a day. She has another medication, Losartan 25 mg, that she also cuts into quarters. I am concerned that she is getting this confused with her Carvedilol. Rx Query has her receiving 12.5 mg with a quantity dispensed and days supply equal to taking 6.25mg BID (for the latest dispense) and prior dispenses of a similar sig. I confirmed with [address] [place] [address] that she last received 6.25 mg taken twice a day. She has a prescription in the waiting bin for 12.5 mg with directions to take 1 tablet once a day. - Patient is taking Losartan 25 mg. Says she is cutting in quarters. Called the pharmacy to verify with Rx Query and she is supposed to be taking 1 tablet per day. - Patient takes atorvastatin 40 mg in the mornings. - Patient is on an insulin pump.   - Patient is on a Fentanyl 100 mcg/hr patch. She last put a new patch on  and stated that she should have put one on 7/10 in the morning. She is not currently wearing one.        -Clarified PTA med list with the patient and [employer]. PTA medication list was corrected to the following:     Prior to Admission Medications   Prescriptions Last Dose Informant Patient Reported? Taking? ARIPiprazole (ABILIFY) 30 mg tablet 7/10/2019 at 1000 Self Yes Yes   Sig: Take 30 mg by mouth daily. CONTOUR NEXT TEST STRIPS strip  Self No No   Sig: Use as directed to test up to 8 x per day,  Uncontrolled Type 1 diabetes with Neuropathy (HCC) E10.40, E10.65   FREESTYLE MARIELA SENSOR kit  Self No No   Sig: Use 1 sensor every 10 days as directed   HYDROcodone-acetaminophen (NORCO)  mg tablet 7/10/2019 at 1000 Self Yes Yes   Sig: Take 1 Tab by mouth. Insulin Needles, Disposable, (BD ULTRA-FINE MINI PEN NEEDLE) 31 gauge x 3/16\" ndle  Self No No   Sig: Use as directed up to 8 times daily   Insulin Syringe-Needle U-100 0.5 mL 31 gauge x 5/16\" syrg  Self No No   Sig: Use as directed up to 4 times daily   LORazepam (ATIVAN) 2 mg tablet 7/10/2019 at 1000 Self Yes Yes   Sig: Take 2 mg by mouth four (4) times daily. atorvastatin (LIPITOR) 40 mg tablet 7/10/2019 at 1000 Self No Yes   Sig: Take 1 Tab by mouth daily. carvedilol (COREG) 25 mg tablet   Yes Yes   Sig: Take 6.25 mg by mouth daily. citalopram (CELEXA) 20 mg tablet 7/10/2019 at 1000 Self Yes Yes   Sig: Take 40 mg by mouth daily. esomeprazole (NEXIUM) 40 mg capsule 7/10/2019 at 1000 Self No Yes   Sig: TAKE ONE CAPSULE BY MOUTH DAILY   fentaNYL (DURAGESIC) 100 mcg/hr [facility] 2019 Self Yes Yes   Si Patch by TransDERmal route every seventy-two (72) hours. furosemide (LASIX) 20 mg tablet 7/10/2019 at 1000 Self Yes Yes   Sig: Take 60 mg by mouth daily. Patient takes 60 mg in the morning and 40 mg in the afternoon.    furosemide (LASIX) 20 mg tablet  Self Yes Yes   Sig: Take 40 mg by mouth daily. Patient takes 60 mg in the morning and 40 mg in the afternoon. gabapentin (NEURONTIN) 300 mg capsule 7/10/2019 at 1000 Self No Yes   Sig: TAKE TWO CAPSULES BY MOUTH THREE TIMES A DAY   glucagon (GLUCAGON EMERGENCY KIT, HUMAN,) 1 mg injection  Self No Yes   Sig: USE A DIRECTED   insulin lispro (HUMALOG) 100 unit/mL injection  Self No Yes   Sig: Use as directed for insulin pump. Max 100 units per day--BILL MEDICARE PART B--DX E10.65--REPLACES NOVOLOG   insulin pump (PATIENT SUPPLIED) misc  Self Yes Yes   Sig: by SubCUTAneous route as needed. levonorgestrel (MIRENA) 20 mcg/24 hr (5 years) IUD  Self Yes Yes   Si Device by IntraUTERine route once. losartan (COZAAR) 25 mg tablet 7/10/2019 at 1000 Self Yes Yes   Sig: Take 6.25 mg by mouth daily. traZODone (DESYREL) 50 mg tablet 2019 at Bedtime Self Yes Yes   Sig: Take 100 mg by mouth nightly.       Facility-Administered Medications: None          Thank you,  Kim Puga

## 2019-07-11 NOTE — PROGRESS NOTES
Primary Nurse Rosalba Barnett RN and Breana Kennedy RN performed a dual skin assessment on this patient No impairment noted  Joey score is 20

## 2019-07-11 NOTE — PROGRESS NOTES
ADULT PROTOCOL: JET AEROSOL ASSESSMENT    Patient  Samm Salinas     51 y.o.   female     7/11/2019  9:32 AM    Breath Sounds Pre Procedure: Right Breath Sounds: Diminished                               Left Breath Sounds: Diminished    Breath Sounds Post Procedure: Right Breath Sounds: Diminished                                 Left Breath Sounds: Diminished    Breathing pattern: Pre procedure Breathing Pattern: Regular          Post procedure Breathing Pattern: Regular    Heart Rate: Pre procedure Pulse: 93           Post procedure Pulse: 101    Resp Rate: Pre procedure Respirations: 18           Post procedure Respirations: 18      Oxygen: O2 Device: Nasal cannula   2L        SpO2: Pre procedure SpO2: 98 %                 Post procedure SpO2: 91 %      Nebulizer Therapy: Current medications Aerosolized Medications: DuoNeb         Smoking History:  Current    Problem List:   Patient Active Problem List   Diagnosis Code    Hyperlipidemia LDL goal <100 E78.5    Uncontrolled type I diabetes mellitus with neuropathy (HCC) E10.40, E10.65    Endometriosis N80.9    Abnormal weight gain R63.5    Elevated LFTs R94.5    Positive blood culture R78.81    Tobacco abuse Z72.0    Anxiety and depression F41.9, F32.9    DKA (diabetic ketoacidoses) (HCC) E13.10    Aspiration pneumonia (HCC) J69.0    Essential hypertension I10    Diabetic nephropathy associated with type 1 diabetes mellitus (HCC) E10.21    Severe obesity (HCC) E66.01    COPD exacerbation ([facility] [address] [address] J44.1       Respiratory Therapist: RT Tristen

## 2019-07-11 NOTE — CONSULTS
Endocrinology Consult    Asked by Dr. Iliana Tee to see this clinic patient of mine for management of diabetes. She called our office yesterday stating that her sugar was staying over 600 and she couldn't get it to come down despite changing her site twice and using new insulin so I advised her to come to the ER for further evaluation. However, she admits to taking 30 units of insulin via syringe prior to coming to the ER and therefore her blood sugar was down to 196 by the time of arrival in the ER and then dropped to 88. Her pump was stopped while in the ER and she was started on steroid for a presumed COPD exacerbation though she has never been formally diagnosed with COPD. After receiving 20 mg of IV solumedrol last night her sugar was up to 433 this morning as she was not given any long acting insulin while off her pump. She was seen by Corinne Glow from the diabetes treatment center and was restarted on her pump around lunch time and her sugar was 477 at that time. She then had 10 units of NPH ordered every 12 hours to match her steroids and first dose was given at noon and I ordered a one time dose of 14 units of NPH to further cover the steroids as weight based dosing of NPH = 0.24 units/kg for 20 mg of solumedrol. Her sugar was down to 388 by the time I saw her at 4:50pm this afternoon. She was feeling much better and wanted to go home and she has an appointment with Dr. Rebecca El in the morning but Dr. Iliana Tee wanted her to be watched overnight to make sure she was stable. Given she is not currently wheezing, decision was made to stop the steroids and just treat with oral augmentin and nebs as needed so I was in touch with Dr. Iliana Tee about this change and we agreed to stop the additional NPH. Assessment/Plan:  1) Type 1 diabetes uncontrolled: A1c 9.3% on admission.   Unclear exactly what precipitated this episode of severe hyperglycemia but possibly an underlying lung infection such as bronchitis could be to blame. Given she will not be on steroids, will not any additional NPH and can continue her current pump settings as programmed. As long as her sugars are in the 300s or less, I'm comfortable with her being discharged in the morning to see Dr. Aleksandar Weems. I spent 30 minutes of face to face time on her case and > 50% of the time was spent reviewing the chart and coordinating her care with Dr. Andrea Ni and the nurse caring for her. Thanks for the consult. Please don't hesitate to call [phone] with further questions.     Kirill Sharif MD    Copy sent to:  Dr. Blanche Weems

## 2019-07-11 NOTE — DIABETES MGMT
[facility]      DTC Pump and Consult Note    Recommendations/ Comments: Pt is a type 1 DM and must have basal insulin to prevent development of DKA. Pt off insulin pump since approx MN last night. Has received 10 units of correctional insulin this am for . Pt is eating well. Pt was started on steroids this am.  Pt discussed with Dr. Zohreh Fraga and orders received for pt to resume her insulin pump. Humalog insulin for pump ordered from the pharmacy. Insulin pump order set placed as well. Correction scale was discontinued as pt should bolus for all meals and correction doses using her pump. Order also received for NPH 10 units Q12hrs which was linked and timed with her solu-medrol. Discussed above with pt's nurse as well as pt. Discussed with pt that her BG may continue to remain elevated today due to being behind on her basal insulin as well as the addition of the steroids this am.    Current hospital DM medication: humalog correction    If patient needs to be removed from pump for greater than 90 minutes then insulin must be replaced with basal Lantus and Humalog correction scale plus meal bolus or insulin gtt    If patient needs an MRI, CT scan or Xray:   1. Insulin pump must be disconnected at the infusion site and left outside of the         room  2. If using a Sure T or TruSteel infusion set it also must be removed completely. 3. If patient is wearing a continuous glucose monitor (sensor) then it must be             completely removed. Met with pt to discuss admission. Per review of communication with Dr. Sony Valdovinos, pt trouble shooted pump correctly by changing her infusion set and insulin twice, used a new bottle of insulin and gave manual injections. Pt reports she ended up taking a total of 93 units of insulin trying to correct her BG. She used new insulin and denies that the site cannulas were bent.   She is unsure why her BG was so elevated and not responding well to her insulin. She denies illness or sick contacts and reports she was unaware she had COPD (per chart). She also opened a new bottle of test strips. Pt is alternating sites on her abdomen. Denies scar tissue issues. Has had bent cannula issues in the past and reports she returned the infusion sets to [employer]. Provided pt with a new contour next one meter, however, pt declined to set the new meter up with her pump at this time. Consult received for: []          Assessment of home management     []          Outpatient education     [x]          Insulin pump patient     []          Insulin infusion     []          DKA/HHS    Chart reviewed and initial evaluation complete on Andrew Arroyo. Patient is a 51 y.o. female with known TYPE 1 DM on medtronic insulin pump at home. Insulin Pump Settings    Pt on medtronic (Type of Pump) and uses quick set 9mm (Type of infusion set). Date of last site change per patient 7/10/19 . Total 24 hour basal dose = 37.3 units/day    If patient needs to come off of insulin pump therapy Total 24 hour basal dose above should be increased by 20% for multiple daily injections      Basal Rates       0000  = 1.6 units/hr  0600  = 1.55 units/hr  2200  = 1.45 units/hr    Insulin Carb Ratios  1 unit per 10 gram          Insulin Sensitivity Factors  1 unit per 20 mg/dl     Target BG  0000= 130 - 150 mg/dl  0600= 100 - 130 mg/dl  2200= 130 - 150 mg/dl    Active Insulin   3 hours    If no to any of the following the pump should be d/c per hospital policy:  · Patient able to program doctors ordered settings,   yes [x]            No   []                · Patient able to provide pump materials  yes [x]              No   []            · Able to change infusion set and fill a new syringe at least for 3 days ( 2 days if pregnant.   yes [x]              No   []            · Patient will change setting if redness, swelling, blood backing up, pump alerts, \"No Delivery\" alarm, or two or more glucose reading         in a row greater than 250mg/dl    yes [x]              No   []           · Patient able to repeat basal rates [x]              No   []            · Patient able to bolus corrections and meals based on physician orders. yes [x]         No   []                Provided patient with the following: [x]          Diabetes Self Care Guide               []          CHO counting education materials               [x]          Outpatient DTC contact number    A1c:   Lab Results   Component Value Date/Time    Hemoglobin A1c 9.3 (H) 07/11/2019 02:12 AM       Recent Glucose Results:   Lab Results   Component Value Date/Time     (H) 07/11/2019 02:12 AM     (H) 07/10/2019 04:32 PM    GLUCPOC 477 (H) 07/11/2019 11:33 AM    GLUCPOC 433 (H) 07/11/2019 07:20 AM    GLUCPOC 121 (H) 07/11/2019 12:05 AM        Lab Results   Component Value Date/Time    Creatinine 1.33 (H) 07/11/2019 02:12 AM     Estimated Creatinine Clearance: 58.8 mL/min (A) (based on SCr of 1.33 mg/dL (H)). Active Orders   Diet    DIET DIABETIC CONSISTENT CARB Regular        PO intake:   Patient Vitals for the past 72 hrs:   % Diet Eaten   07/11/19 1231 100 %   07/11/19 0943 50 %   07/11/19 0757 0 %       Will continue to follow as needed. Thank you.   Taya Del Toro, JAIMEN, RN, CDE  [facility]        Time spent: 60 min

## 2019-07-11 NOTE — PROGRESS NOTES
Problem: Mobility Impaired (Adult and Pediatric)  Goal: *Acute Goals and Plan of Care (Insert Text)  Note:   PHYSICAL THERAPY EVALUATION/DISCHARGE  Patient: Nelly Pena (37 y.o. female)  Date: 7/11/2019  Primary Diagnosis: COPD exacerbation (HCC) [J44.1]        Precautions: standard     ASSESSMENT :  Based on the objective data described below, the patient presents with good strength, mobility, but slightly decreased activity tolerance. Patient supine in bed upon arrival, agreeable to PT. O2 sats 99% at rest on 1L O2. Independent bed mobility. Good sitting balance EOB. Sit to stand with supervision. Patient ambulated 80' with no assistive device with decreased pace. Good balance observed while ambulating. Patient up in chair after ambulating; sats 91%, ; increase to 95% after 2 minutes. Left patient on 1L O2; RN notified. Prior to admission patient was independent with all mobility and lives with boyfriend. Patient is at baseline for mobility, just somewhat SOB with activity. No further PT recommended at this time. Further skilled acute physical therapy is not indicated at this time.      PLAN :  Discharge Recommendations: None  Further Equipment Recommendations for Discharge: none      SUBJECTIVE:   Patient stated I don't think I need PT.    OBJECTIVE DATA SUMMARY:   HISTORY:    Past Medical History:   Diagnosis Date    Achilles tendon rupture     along with torn right patellar/femoral ligament    Arthritis     rt. foot    Chronic kidney disease     Chronic pain     abdominal pain    Diabetes (HCC)     IDDM on insulin pump and sensor    DM type [facility]di[facility] mellitus[facility]     age 25    Endometriosis     s/p ex-lap 4x    Gastric ulcer     GERD (gastroesophageal reflux disease)     Herpes     Other and unspecified hyperlipidemia     Panic attacks     Psychiatric disorder     anxiety, panic attacks    PTSD (post-traumatic stress disorder)     Unspecified essential hypertension      Past Surgical History:   Procedure Laterality Date    HX COLONOSCOPY      HX GYN      2 d&c's    HX GYN      laparoscopies x5 for endometriosis    HX GYN      mirena in place    HX ORTHOPAEDIC  2002    achiles tendon repair,talus repair    HX ORTHOPAEDIC      injections x2 to right shoulder    HX ORTHOPAEDIC Left 02/07/2019    3rd trigger finger release     Prior Level of Function/Home Situation: Independent with all mobility  Personal factors and/or comorbidities impacting plan of care: none noted    Home Situation  Home Environment: Private residence  One/Two Story Residence: One story  Living Alone: No  Support Systems: Spouse/Significant Other/Partner  Patient Expects to be Discharged to[de-identified] Private residence  Current DME Used/Available at Home: Glucometer    EXAMINATION/PRESENTATION/DECISION MAKING:   Critical Behavior:  Neurologic State: Alert  Orientation Level: Oriented X4  Cognition: Appropriate decision making  Safety/Judgement: Awareness of environment, Insight into deficits  Hearing: Auditory  Auditory Impairment: None  Skin:  intact  Edema: none noted  Range Of Motion:  AROM: Within functional limits           PROM: Within functional limits           Strength:    Strength:  Within functional limits                    Tone & Sensation:   Tone: Normal              Sensation: Intact               Coordination:  Coordination: Within functional limits  Vision:      Functional Mobility:  Bed Mobility:  Rolling: Independent  Supine to Sit: Independent     Scooting: Independent  Transfers:  Sit to Stand: Supervision  Stand to Sit: Independent        Bed to Chair: Supervision              Balance:   Sitting: Intact  Standing: Intact  Ambulation/Gait Training:  Distance (ft): 110 Feet (ft)  Assistive Device: Gait belt  Ambulation - Level of Assistance: Stand-by assistance                       Speed/Sheba: Pace decreased (<100 feet/min)  Step Length: Left shortened;Right shortened Functional Measure:  Barthel Index:    Bathin  Bladder: 10  Bowels: 10  Groomin  Dressing: 10  Feeding: 10  Mobility: 15  Stairs: 5  Toilet Use: 10  Transfer (Bed to Chair and Back): 15  Total: 95/100       Percentage of impairment   0%   1-19%   20-39%   40-59%   60-79%   80-99%   100%   Barthel Score 0-100 100 99-80 79-60 59-40 20-39 1-19   0     The Barthel ADL Index: Guidelines  1. The index should be used as a record of what a patient does, not as a record of what a patient could do. 2. The main aim is to establish degree of independence from any help, physical or verbal, however minor and for whatever reason. 3. The need for supervision renders the patient not independent. 4. A patient's performance should be established using the best available evidence. Asking the patient, friends/relatives and nurses are the usual sources, but direct observation and common sense are also important. However direct testing is not needed. 5. Usually the patient's performance over the preceding 24-48 hours is important, but occasionally longer periods will be relevant. 6. Middle categories imply that the patient supplies over 50 per cent of the effort. 7. Use of aids to be independent is allowed. Theo Ash., Barthel, D.W. (7832). Functional evaluation: the Barthel Index. [address] [facility] (14)2. FRANTZ العلي, Verner Loan., Sharon Oglesby., [place], [address] [place] [address] (). Measuring the change indisability after inpatient rehabilitation; comparison of the responsiveness of the Barthel Index and Functional [place] Measure. Journal of Neurology, Neurosurgery, and Psychiatry, 66(4), 176-435. Jairo Membreno, N.J.A, SCOTT Soares.SULLY, & Diaz Worley MNaseemA. (2004.) Assessment of post-stroke quality of life in cost-effectiveness studies: The usefulness of the Barthel Index and the EuroQoL-5D.  Quality of Life Research, 15, 105-87            Physical Therapy Evaluation Charge Determination   History Examination Presentation Decision-Making   MEDIUM  Complexity : 1-2 comorbidities / personal factors will impact the outcome/ POC  LOW Complexity : 1-2 Standardized tests and measures addressing body structure, function, activity limitation and / or participation in recreation  LOW Complexity : Stable, uncomplicated  LOW Complexity : FOTO score of       Based on the above components, the patient evaluation is determined to be of the following complexity level: LOW     Pain:  Pain Scale 1: Numeric (0 - 10)  Pain Intensity 1: 7  Pain Location 1: Abdomen  Activity Tolerance:   fair  Please refer to the flowsheet for vital signs taken during this treatment. After treatment:   ?   Patient left in no apparent distress sitting up in chair  ? Patient left in no apparent distress in bed  ? Call bell left within reach  ? Nursing notified  ? Caregiver present  ? Bed alarm activated    COMMUNICATION/EDUCATION:   Communication/Collaboration:  ?   Fall prevention education was provided and the patient/caregiver indicated understanding. ? Patient/family have participated as able and agree with findings and recommendations. ?   Patient is unable to participate in plan of care at this time.   Findings and recommendations were discussed with: Registered Nurse    Thank you for this referral.  Baljit Renteria, PT   Time Calculation: 20 mins

## 2019-07-11 NOTE — PROGRESS NOTES
Problem: Falls - Risk of  Goal: *Absence of Falls  Description  Document Mamie Ventura Fall Risk and appropriate interventions in the flowsheet. Outcome: Progressing Towards Goal     Problem: Chronic Obstructive Pulmonary Disease (COPD)  Goal: *Oxygen saturation during activity within specified parameters  Outcome: Progressing Towards Goal  Goal: *Able to remain out of bed as prescribed  Outcome: Progressing Towards Goal  Goal: *Absence of hypoxia  Outcome: Progressing Towards Goal     Problem: Diabetes Self-Management  Goal: *Disease process and treatment process  Description  Define diabetes and identify own type of diabetes; list 3 options for treating diabetes. Outcome: Progressing Towards Goal  Goal: *Incorporating nutritional management into lifestyle  Description  Describe effect of type, amount and timing of food on blood glucose; list 3 methods for planning meals. Outcome: Progressing Towards Goal  Goal: *Incorporating physical activity into lifestyle  Description  State effect of exercise on blood glucose levels. Outcome: Progressing Towards Goal  Goal: *Developing strategies to promote health/change behavior  Description  Define the ABC's of diabetes; identify appropriate screenings, schedule and personal plan for screenings. Outcome: Progressing Towards Goal  Goal: *Using medications safely  Description  State effect of diabetes medications on diabetes; name diabetes medication taking, action and side effects. Outcome: Progressing Towards Goal  Goal: *Monitoring blood glucose, interpreting and using results  Description  Identify recommended blood glucose targets  and personal targets. Outcome: Progressing Towards Goal  Goal: *Prevention, detection, treatment of acute complications  Description  List symptoms of hyper- and hypoglycemia; describe how to treat low blood sugar and actions for lowering  high blood glucose level.   Outcome: Progressing Towards Goal

## 2019-07-11 NOTE — ED NOTES
TRANSFER - OUT REPORT:    Verbal report given to Claudia JOHN(name) on Js Case  being transferred to [address] (unit) for routine progression of care       Report consisted of patients Situation, Background, Assessment and   Recommendations(SBAR). Information from the following report(s) SBAR, Kardex, ED Summary, MAR, Recent Results and Cardiac Rhythm NSR was reviewed with the receiving nurse. Lines:   Peripheral IV 07/10/19 Left Antecubital (Active)   Site Assessment Clean, dry, & intact 7/10/2019  8:44 PM   Phlebitis Assessment 0 7/10/2019  8:44 PM   Infiltration Assessment 0 7/10/2019  8:44 PM   Dressing Status Clean, dry, & intact 7/10/2019  8:44 PM   Dressing Type Transparent;Tape 7/10/2019  8:44 PM   Hub Color/Line Status Pink;Flushed;Patent 7/10/2019  8:44 PM   Action Taken Blood drawn 7/10/2019  8:44 PM        Opportunity for questions and clarification was provided.       Patient transported with:   Oxygen 2 liters per nasal cannula     Tech

## 2019-07-11 NOTE — PROGRESS NOTES
Hospitalist Progress Note    NAME: Alida [facility]   :  1970   MRN:  [id number]       Assessment / Plan:  Acute hypoxemic respiratory failure most likely secondary to COPD exacerbation with acute bronchitis  improved clinically   O2: stable on RA   -stop steroids ( not wheezing)   -cont scheduled jet nebs  -AB: changing IV CTX/zithromax --. Augmentin PO, total 5 days   -start breo elipta for maintenance, refer to pulmonology OP  Need PFTs      DEBORAH POA on CKD stage III / hyponatremia   -baseline Cr ~ 1.2, admission 1.66--> 1.33  -cont fluids overnight  -holding lasix /arb today   -monitor closely. Avoid nephrotoxic drugs, adjust all meds to GFR. -scheduled to see nephrology in am     DM type I   -BS high side due to steroids   -appreciated help of Dr Marine Grimes with diabetes management   Back on insulin pump     Hypertension  -BP improved, starting back on cozaar in am  Holding lasix     Neuropathy, cont Neurontin   Psych disorders/Posttraumatic stress disorder. Continue home medication  History of gastric ulcer. Start patient on Pepcid 20 mg twice daily  Hyperlipidemia, continue Lipitor 40 mg daily  Tobacco abuse, nicotine patch          Code Status: Full   Surrogate Decision Maker:      Baseline: independent   Recommended Disposition: Home w/Family tomorrow in am if stable, she has appointment with nephrology ~ 10 am       Subjective:     Chief Complaint / Reason for Physician Visit: following bronchitis / DM type II   Dyspnea improved   Feeling better  O2: on RA , stable     Discussed with RN events overnight.      Review of Systems:  Symptom Y/N Comments  Symptom Y/N Comments   Fever/Chills n   Chest Pain n    Poor Appetite    Edema     Cough    Abdominal Pain     Sputum    Joint Pain     SOB/HERNÁNDEZ n   Pruritis/Rash     Nausea/vomit    Tolerating PT/OT     Diarrhea    Tolerating Diet     Constipation    Other       Could NOT obtain due to:      Objective:     VITALS:   Last 24hrs VS reviewed since prior progress note. Most recent are:  Patient Vitals for the past 24 hrs:   Temp Pulse Resp BP SpO2   07/11/19 1504 98.2 °F (36.8 °C) 89 17 141/72 95 %   07/11/19 1437     96 %   07/11/19 1135 98.2 °F (36.8 °C) 83 17 132/69 93 %   07/11/19 0835     98 %   07/11/19 0718 98.2 °F (36.8 °C) 94 17 116/50 94 %   07/11/19 0613 97.9 °F (36.6 °C) 92 18 140/82 95 %   07/11/19 0500  74 17 99/47 94 %   07/11/19 0400  75 16 92/58 92 %   07/11/19 0327  77 15  94 %   07/11/19 0324  78 12 91/65 (!) 88 %   07/11/19 0300  79 17 92/54 91 %   07/11/19 0200  63 15  94 %   07/11/19 0100  67   93 %   07/11/19 0000  69   94 %   07/10/19 2300  75   92 %   07/10/19 2130  78  111/66 98 %   07/10/19 2100  76  (!) 112/91 99 %   07/10/19 2030  81  104/61 (!) 86 %   07/10/19 1930    102/62 (!) 88 %   07/10/19 1920     90 %   07/10/19 1900    106/81 99 %   07/10/19 1830    96/63 94 %   07/10/19 1801    108/65 97 %       Intake/Output Summary (Last 24 hours) at 7/11/2019 1724  Last data filed at 7/11/2019 1231  Gross per 24 hour   Intake 3063.75 ml   Output    Net 3063.75 ml        PHYSICAL EXAM:  General: WD, WN. Alert, cooperative, no acute distress    EENT:  EOMI. Anicteric sclerae. MMM  Resp:  CTA bilaterally, no wheezing or rales. No accessory muscle use  CV:  Regular  rhythm,  No edema  GI:  Soft, Non distended, Non tender.  +Bowel sounds  Neurologic:  Alert and oriented X 3, normal speech,   Psych:   Good insight. + very anxious, not agitated  Skin:  No rashes.   No jaundice    Reviewed most current lab test results and cultures  YES  Reviewed most current radiology test results   YES  Review and summation of old records today    NO  Reviewed patient's current orders and MAR    YES  PMH/ reviewed - no change compared to H&P  ________________________________________________________________________  Care Plan discussed with:    Comments   Patient y    Family      RN y    Care Manager Consultant  y Endocrinology                      Multidiciplinary team rounds were held today with , nursing, pharmacist and clinical coordinator. Patient's plan of care was discussed; medications were reviewed and discharge planning was addressed. ________________________________________________________________________  Total NON critical care TIME:  35  Minutes    Total CRITICAL CARE TIME Spent:   Minutes non procedure based      Comments   >50% of visit spent in counseling and coordination of care     ________________________________________________________________________  William Gaspar MD     Procedures: see electronic medical records for all procedures/Xrays and details which were not copied into this note but were reviewed prior to creation of Plan. LABS:  I reviewed today's most current labs and imaging studies.   Pertinent labs include:  Recent Labs     07/11/19  0212 07/10/19  1632   WBC 6.5 8.8   HGB 12.2 12.6   HCT 35.7 36.8    321     Recent Labs     07/11/19  0212 07/10/19  1632   * 130*   K 3.8 3.8   CL 92* 92*   CO2 31 32   * 171*   BUN 30* 37*   CREA 1.33* 1.66*   CA 8.5 9.0   ALB  --  4.0   TBILI  --  0.4   SGOT  --  16   ALT  --  21       Signed: William Gaspar MD

## 2019-07-11 NOTE — ED PROVIDER NOTES
EMERGENCY DEPARTMENT HISTORY AND PHYSICAL EXAM      Date: 7/10/2019  Patient Name: Grey Perez    History of Presenting Illness     Chief Complaint   Patient presents with    High Blood Sugar     BS over 600 for 2 days, patient has insulin pump, in contact with doctor. History Provided By: Patient    HPI: Grey Perez, 51 y.o. female with PMHx significant for type 2 diabetes, chronic smoking history, presents to the ED with cc of severe hyperglycemia, productive cough, sinus congestion, and general malaise malaise over the last 3 to 4 days. Patient reports she is getting blood sugar measurements over 600 over the last 3 days. She called her endocrinologist who told her to come to the ER for further evaluation. The patient reports giving herself 30 units of insulin 2 hours before arrival.  She denies any abdominal pain, nausea, vomiting, diarrhea, or any other associated symptoms. Patient reports her cough is nonproductive worse. No recent change in medications and no recent sick contacts. No other associated symptoms. No other exacerbating or ameliorating factors. There are no other complaints, changes, or physical findings at this time. PCP: Julita Savage, DO    No current facility-administered medications on file prior to encounter. Current Outpatient Medications on File Prior to Encounter   Medication Sig Dispense Refill    LORazepam (ATIVAN) 2 mg tablet Take 2 mg by mouth three (3) times daily.  losartan (COZAAR) 25 mg tablet Take 1/4 tab daily      metOLazone (ZAROXOLYN) 5 mg tablet As needed      carvedilol (COREG) 6.25 mg tablet Take 1 tab daily      citalopram (CELEXA) 20 mg tablet 40 mg.      traZODone (DESYREL) 50 mg tablet       insulin lispro (HUMALOG) 100 unit/mL injection Use as directed for insulin pump.   Max 100 units per day--BILL MEDICARE PART B--DX E10.65--REPLACES NOVOLOG 3 Vial 11    CONTOUR NEXT TEST STRIPS strip Use as directed to test up to 8 x per day,  Uncontrolled Type 1 diabetes with Neuropathy (HCC) E10.40, E10.65 250 Strip 11    Insulin Syringe-Needle U-100 0.5 mL 31 gauge x 5/16\" syrg Use as directed up to 4 times daily 100 Syringe 11    furosemide (LASIX) 20 mg tablet Take 2 Tabs by mouth two (2) times a day. Per Dr. Green Flavors change 2/27/19--updated med list--did not send prescription to the pharmacy      glucagon (GLUCAGON EMERGENCY KIT, HUMAN,) 1 mg injection USE A DIRECTED 2 Vial 11    esomeprazole (NEXIUM) 40 mg capsule TAKE ONE CAPSULE BY MOUTH DAILY 30 Cap 8    Insulin Needles, Disposable, (BD ULTRA-FINE MINI PEN NEEDLE) 31 gauge x 3/16\" ndle Use as directed up to 8 times daily 200 Pen Needle 11    gabapentin (NEURONTIN) 300 mg capsule TAKE TWO CAPSULES BY MOUTH THREE TIMES A  Cap 11    atorvastatin (LIPITOR) 40 mg tablet Take 1 Tab by mouth daily. 90 Tab 3    levonorgestrel (MIRENA) 20 mcg/24 hr (5 years) IUD 1 Device by IntraUTERine route once.  ARIPiprazole (ABILIFY) 30 mg tablet Take 30 mg by mouth daily.  fentaNYL (DURAGESIC) 100 mcg/hr PATCH 1 Patch by TransDERmal route every seventy-two (72) hours.  FREESTYLE MARIELA SENSOR kit Use 1 sensor every 10 days as directed 3 Each 11    nystatin (MYCOSTATIN) 100,000 unit/mL suspension Take 5 mL by mouth four (4) times daily.  swish and spit 1500 mL 1       Past History     Past Medical History:  Past Medical History:   Diagnosis Date    Achilles tendon rupture     along with torn right patellar/femoral ligament    Arthritis     rt. foot    Chronic kidney disease     Chronic pain     abdominal pain    Diabetes (HCC)     IDDM on insulin pump and sensor    DM type 1 (diabetes mellitus, type 1) ([facility] [address]     age 25    Endometriosis     s/p ex-lap 4x    Gastric ulcer     GERD (gastroesophageal reflux disease)     Herpes     Other and unspecified hyperlipidemia     Panic attacks     Psychiatric disorder     anxiety, panic attacks    PTSD (post-traumatic stress disorder)     Unspecified essential hypertension        Past Surgical History:  Past Surgical History:   Procedure Laterality Date    HX COLONOSCOPY      HX GYN      2 d&c's    HX GYN      laparoscopies x5 for endometriosis    HX GYN      mirena in place    HX ORTHOPAEDIC  2002    achiles tendon repair,talus repair    HX ORTHOPAEDIC      injections x2 to right shoulder    HX ORTHOPAEDIC Left 02/07/2019    3rd trigger finger release       Family History:  Family History   Problem Relation Age of Onset    Diabetes Mother         diet controlled    Diabetes Father         R foot amupation    Liver Disease Father     Heart Disease Paternal Uncle         MI in his 46s    Stroke Neg Hx        Social History:  Social History     Tobacco Use    Smoking status: Current Some Day Smoker     Packs/day: 1.00     Years: 17.00     Pack years: 17.00     Types: Cigarettes    Smokeless tobacco: Current User    Tobacco comment: Trying to quit   Substance Use Topics    Alcohol use: No     Comment: a beer every few months    Drug use: No     Types: OTC, Prescription       Allergies: Allergies   Allergen Reactions    Zocor [Simvastatin] Myalgia    Lisinopril Cough         Review of Systems   Review of Systems   Constitutional: Positive for fatigue. Negative for chills, diaphoresis and fever. HENT: Negative for ear pain and sore throat. Eyes: Negative for pain and redness. Respiratory: Positive for cough. Negative for shortness of breath. Cardiovascular: Negative for chest pain and leg swelling. Gastrointestinal: Negative for abdominal pain, diarrhea, nausea and vomiting. Endocrine: Negative for cold intolerance and heat intolerance. Genitourinary: Negative for flank pain and hematuria. Musculoskeletal: Negative for back pain and neck stiffness. Skin: Negative for rash and wound. Neurological: Positive for weakness. Negative for dizziness, syncope and headaches.    All other systems reviewed and are negative. Physical Exam   Physical Exam   Constitutional: She is oriented to person, place, and time. She appears well-developed and well-nourished. HENT:   Head: Normocephalic and atraumatic. Mouth/Throat: Oropharynx is clear and moist. No oropharyngeal exudate. Eyes: Pupils are equal, round, and reactive to light. Conjunctivae and EOM are normal.   Neck: Normal range of motion. Cardiovascular: Normal rate and regular rhythm. No murmur heard. Pulmonary/Chest: Effort normal and breath sounds normal. No respiratory distress. She has no wheezes. Patient has mild end expiratory wheezing in all lung fields. No accessory muscle use and no tachypnea. Patient speaking in full sentences and does not have any apparent shortness of breath. Abdominal: Soft. Bowel sounds are normal. She exhibits no distension. There is no tenderness. Musculoskeletal: Normal range of motion. She exhibits no edema or deformity. Neurological: She is alert and oriented to person, place, and time. Coordination normal.   Skin: Skin is warm and dry. No rash noted. Psychiatric: She has a normal mood and affect. Her behavior is normal.   Nursing note and vitals reviewed.       Diagnostic Study Results     Labs -     Recent Results (from the past 24 hour(s))   GLUCOSE, POC    Collection Time: 07/10/19  4:23 PM   Result Value Ref Range    Glucose (POC) 196 (H) 65 - 100 mg/dL    Performed by [address] [place] [address] Drive, POC    Collection Time: 07/10/19  4:24 PM   Result Value Ref Range    Glucose (POC) 190 (H) 65 - 100 mg/dL    Performed by Umair Teixeira    CBC WITH AUTOMATED DIFF    Collection Time: 07/10/19  4:32 PM   Result Value Ref Range    WBC 8.8 3.6 - 11.0 K/uL    RBC 4.34 3.80 - 5.20 M/uL    HGB 12.6 11.5 - 16.0 g/dL    HCT 36.8 35.0 - 47.0 %    MCV 84.8 80.0 - 99.0 FL    MCH 29.0 26.0 - 34.0 PG    MCHC 34.2 30.0 - 36.5 g/dL    RDW 16.5 (H) 11.5 - 14.5 %    PLATELET 220 533 - 287 K/uL    MPV 9.2 8.9 - 12.9 FL    NRBC 0.0 0  WBC    ABSOLUTE NRBC 0.00 0.00 - 0.01 K/uL    NEUTROPHILS 57 32 - 75 %    LYMPHOCYTES 35 12 - 49 %    MONOCYTES 8 5 - 13 %    EOSINOPHILS 0 0 - 7 %    BASOPHILS 0 0 - 1 %    IMMATURE GRANULOCYTES 0 0.0 - 0.5 %    ABS. NEUTROPHILS 5.0 1.8 - 8.0 K/UL    ABS. LYMPHOCYTES 3.1 0.8 - 3.5 K/UL    ABS. MONOCYTES 0.7 0.0 - 1.0 K/UL    ABS. EOSINOPHILS 0.0 0.0 - 0.4 K/UL    ABS. BASOPHILS 0.0 0.0 - 0.1 K/UL    ABS. IMM. GRANS. 0.0 0.00 - 0.04 K/UL    DF AUTOMATED     METABOLIC PANEL, COMPREHENSIVE    Collection Time: 07/10/19  4:32 PM   Result Value Ref Range    Sodium 130 (L) 136 - 145 mmol/L    Potassium 3.8 3.5 - 5.1 mmol/L    Chloride 92 (L) 97 - 108 mmol/L    CO2 32 21 - 32 mmol/L    Anion gap 6 5 - 15 mmol/L    Glucose 171 (H) 65 - 100 mg/dL    BUN 37 (H) 6 - 20 MG/DL    Creatinine 1.66 (H) 0.55 - 1.02 MG/DL    BUN/Creatinine ratio 22 (H) 12 - 20      GFR est AA 40 (L) >60 ml/min/1.73m2    GFR est non-AA 33 (L) >60 ml/min/1.73m2    Calcium 9.0 8.5 - 10.1 MG/DL    Bilirubin, total 0.4 0.2 - 1.0 MG/DL    ALT (SGPT) 21 12 - 78 U/L    AST (SGOT) 16 15 - 37 U/L    Alk.  phosphatase 104 45 - 117 U/L    Protein, total 7.4 6.4 - 8.2 g/dL    Albumin 4.0 3.5 - 5.0 g/dL    Globulin 3.4 2.0 - 4.0 g/dL    A-G Ratio 1.2 1.1 - 2.2     URINALYSIS W/ REFLEX CULTURE    Collection Time: 07/10/19  4:32 PM   Result Value Ref Range    Color YELLOW/STRAW      Appearance CLEAR CLEAR      Specific gravity 1.013 1.003 - 1.030      pH (UA) 6.0 5.0 - 8.0      Protein NEGATIVE  NEG mg/dL    Glucose NEGATIVE  NEG mg/dL    Ketone NEGATIVE  NEG mg/dL    Bilirubin NEGATIVE  NEG      Blood NEGATIVE  NEG      Urobilinogen 0.2 0.2 - 1.0 EU/dL    Nitrites NEGATIVE  NEG      Leukocyte Esterase NEGATIVE  NEG      WBC 0-4 0 - 4 /hpf    RBC 0-5 0 - 5 /hpf    Epithelial cells FEW FEW /lpf    Bacteria NEGATIVE  NEG /hpf    UA:UC IF INDICATED CULTURE NOT INDICATED BY UA RESULT CNI      Hyaline cast 0-2 0 - 5 /lpf   GLUCOSE, POC Collection Time: 07/10/19  5:38 PM   Result Value Ref Range    Glucose (POC) 88 65 - 100 mg/dL    Performed by [facility], POC    Collection Time: 07/10/19  5:53 PM   Result Value Ref Range    Glucose (POC) 108 (H) 65 - 100 mg/dL    [facility] by [facility], POC    Collection Time: 07/10/19  7:42 PM   Result Value Ref Range    Glucose (POC) 100 65 - 100 mg/dL    Performed by Misty Benson        Radiologic Studies -   XR CHEST PORT   Final Result   Impression:   No acute cardiopulmonary process. CT Results  (Last 48 hours)    None        CXR Results  (Last 48 hours)               07/10/19 1818  XR CHEST PORT Final result    Impression:  Impression:   No acute cardiopulmonary process. Narrative:  Chest AP       History: Cough       Comparison: 3/1/2019       Findings: The lungs are well expanded. No focal consolidation, pleural   effusion, or pneumothorax. The cardiomediastinal silhouette is unremarkable. The visualized osseous structures are unremarkable. Medical Decision Making   I am the first provider for this patient. I reviewed the vital signs, available nursing notes, past medical history, past surgical history, family history and social history. Vital Signs-Reviewed the patient's vital signs. Patient Vitals for the past 24 hrs:   Temp Pulse Resp BP SpO2   07/10/19 1920     90 %   07/10/19 1900    106/81 99 %   07/10/19 1830    96/63 94 %   07/10/19 1801    108/65 97 %   07/10/19 1616 98.4 °F (36.9 °C) 84 18 127/64 95 %       Pulse Oximetry Analysis -95 % on room air    Cardiac Monitor:   Rate: 84 bpm  Rhythm: Normal Sinus Rhythm        Records Reviewed: Nursing Notes and Old Medical Records    Differential Diagnosis:    Patient presents with acute dyspnea. DDx: asthma, copd, pna, pulmonary edema, acute bronchitis, ACS, ptx, pna.  Will obtain EKG, labs, CXR, provide O2 as needed for hypoxia, treat symptomatically and reassess. Will continue to monitor closely in ED. Provider Notes (Medical Decision Making):   Patient is blood sugars are normal and there is no evidence of DKA or HH NK. Patient has a normal chest x-ray. Despite patient feeling better, she has a persistent profound hypoxia during her stay. O2 sats dropped as low as 80% on room air and there were 71% on exertion. This is likely due to her underlying COPD exacerbation that she does not carry this diagnosis. CT scan showed evidence of emphysema but no other findings. Given her hypoxia, hyponatremia, and acute kidney injury will admit her for observation and further management. ED Course:     Initial assessment performed. The patients presenting problems have been discussed, and they are in agreement with the care plan formulated and outlined with them. I have encouraged them to ask questions as they arise throughout their visit. Critical Care Time:     None    Disposition:  Admit Note:  11:05 PM  Pt is being admitted by hospitalist. The results of their tests and reason(s) for their admission have been discussed with pt and/or available family. They convey agreement and understanding for the need to be admitted and for admission diagnosis. PLAN:  1. Current Discharge Medication List      START taking these medications    Details   amoxicillin-clavulanate (AUGMENTIN) 875-125 mg per tablet Take 1 Tab by mouth two (2) times a day. Qty: 20 Tab, Refills: 0      benzonatate (TESSALON PERLES) 100 mg capsule Take 1 Cap by mouth three (3) times daily as needed for Cough for up to 7 days. Qty: 30 Cap, Refills: 0      oxyCODONE-acetaminophen (PERCOCET) 5-325 mg per tablet Take 1 Tab by mouth every eight (8) hours as needed for Pain for up to 3 days. Max Daily Amount: 3 Tabs.  Indications: Pain  Qty: 10 Tab, Refills: 0    Associated Diagnoses: Acute bronchitis, unspecified organism      fluticasone (FLONASE SENSIMIST) 27.5 mcg/actuation nasal spray 2 Sprays by Nasal route daily. Qty: 1 g, Refills: 0           2. Follow-up Information     Follow up With Specialties Details Why Contact Info    Stephen Pepe, DO Internal Medicine In 1 week  [facility]  [phone]      [facility] [address] [facility]  In 1 week  [facility] [address]        Return to ED if worse     Diagnosis     Clinical Impression:   1. Acute bronchitis, unspecified organism    2.  Acute non-recurrent frontal sinusitis

## 2019-07-11 NOTE — PROGRESS NOTES
Endocrinology Progress Note    Had the following staff message exchange with Sary Smith from [facility]:    Thanks so much for your help. I just spoke with Miller Bañuelos the hospitalist and I will see her later this afternoon. I increased her NPH to better match her solumedrol to 24 units every 12 hours and will give a one time dose of 14 units now to make up for only getting 10 units at noon. I spoke with her nurse, Jarvis Pineda, too about this plan and will see her later this afternoon between 3-5pm.    ===View-only below this line===    ----- Message -----  From: Sis Grover, Certified Diabetes Educator  Sent: 7/11/2019   1:35 PM  To: Ricco Alexander MD    Hi Dr. Brita Bence,  Just wanted to let you know that I have seen Ms. Kassandra Damon. No clear reason as to why she was not responding to her insulin. Lots of details in my note if you wish to read it. She was removed from her pump in the ED with no basal insulin administered and therefore was high this am along with due to starting steroids. I have had her resume her pump and also got an order for NPH for the steroids that they ordered and started this am.  Thanks. Please don't hesitate to call [phone] with further questions.     Ricco Alexander MD

## 2019-07-12 VITALS
DIASTOLIC BLOOD PRESSURE: 84 MMHG | TEMPERATURE: 97.8 F | RESPIRATION RATE: 20 BRPM | HEART RATE: 69 BPM | BODY MASS INDEX: 34.75 KG/M2 | HEIGHT: 65 IN | WEIGHT: 208.56 LBS | SYSTOLIC BLOOD PRESSURE: 149 MMHG | OXYGEN SATURATION: 100 %

## 2019-07-12 LAB
ANION GAP SERPL CALC-SCNC: 6 MMOL/L (ref 5–15)
BUN SERPL-MCNC: 22 MG/DL (ref 6–20)
BUN/CREAT SERPL: 21 (ref 12–20)
CALCIUM SERPL-MCNC: 8.4 MG/DL (ref 8.5–10.1)
CHLORIDE SERPL-SCNC: 93 MMOL/L (ref 97–108)
CO2 SERPL-SCNC: 28 MMOL/L (ref 21–32)
CREAT SERPL-MCNC: 1.05 MG/DL (ref 0.55–1.02)
GLUCOSE BLD STRIP.AUTO-MCNC: 330 MG/DL (ref 65–100)
GLUCOSE SERPL-MCNC: 164 MG/DL (ref 65–100)
MAGNESIUM SERPL-MCNC: 1.8 MG/DL (ref 1.6–2.4)
PHOSPHATE SERPL-MCNC: 3.2 MG/DL (ref 2.6–4.7)
POTASSIUM SERPL-SCNC: 3.6 MMOL/L (ref 3.5–5.1)
SERVICE CMNT-IMP: ABNORMAL
SODIUM SERPL-SCNC: 127 MMOL/L (ref 136–145)

## 2019-07-12 PROCEDURE — 84100 ASSAY OF PHOSPHORUS: CPT

## 2019-07-12 PROCEDURE — 74011000250 HC RX REV CODE- 250: Performed by: HOSPITALIST

## 2019-07-12 PROCEDURE — 74011250636 HC RX REV CODE- 250/636: Performed by: INTERNAL MEDICINE

## 2019-07-12 PROCEDURE — 94640 AIRWAY INHALATION TREATMENT: CPT

## 2019-07-12 PROCEDURE — 94760 N-INVAS EAR/PLS OXIMETRY 1: CPT

## 2019-07-12 PROCEDURE — 36415 COLL VENOUS BLD VENIPUNCTURE: CPT

## 2019-07-12 PROCEDURE — 82962 GLUCOSE BLOOD TEST: CPT

## 2019-07-12 PROCEDURE — 83735 ASSAY OF MAGNESIUM: CPT

## 2019-07-12 PROCEDURE — 80048 BASIC METABOLIC PNL TOTAL CA: CPT

## 2019-07-12 PROCEDURE — 74011250637 HC RX REV CODE- 250/637: Performed by: HOSPITALIST

## 2019-07-12 RX ORDER — INSULIN LISPRO 100 [IU]/ML
INJECTION, SOLUTION INTRAVENOUS; SUBCUTANEOUS
Qty: 2 VIAL | Refills: 0 | Status: SHIPPED | COMMUNITY
Start: 2019-07-12 | End: 2019-08-30 | Stop reason: SDUPTHER

## 2019-07-12 RX ORDER — ALBUTEROL SULFATE 90 UG/1
1-2 AEROSOL, METERED RESPIRATORY (INHALATION)
Qty: 1 INHALER | Refills: 0 | Status: SHIPPED | OUTPATIENT
Start: 2019-07-12 | End: 2019-12-09 | Stop reason: SDUPTHER

## 2019-07-12 RX ADMIN — AMOXICILLIN AND CLAVULANATE POTASSIUM 1 TABLET: 875; 125 TABLET, FILM COATED ORAL at 09:02

## 2019-07-12 RX ADMIN — ENOXAPARIN SODIUM 40 MG: 40 INJECTION SUBCUTANEOUS at 06:04

## 2019-07-12 RX ADMIN — Medication 10 ML: at 06:04

## 2019-07-12 RX ADMIN — GABAPENTIN 300 MG: 300 CAPSULE ORAL at 09:02

## 2019-07-12 RX ADMIN — CARVEDILOL 6.25 MG: 6.25 TABLET, FILM COATED ORAL at 09:02

## 2019-07-12 RX ADMIN — LOSARTAN POTASSIUM 12.5 MG: 25 TABLET ORAL at 09:03

## 2019-07-12 RX ADMIN — LORAZEPAM 2 MG: 1 TABLET ORAL at 09:02

## 2019-07-12 RX ADMIN — IPRATROPIUM BROMIDE AND ALBUTEROL SULFATE 3 ML: .5; 3 SOLUTION RESPIRATORY (INHALATION) at 01:35

## 2019-07-12 RX ADMIN — HYDROCODONE BITARTRATE AND ACETAMINOPHEN 1 TABLET: 5; 325 TABLET ORAL at 04:16

## 2019-07-12 RX ADMIN — PANTOPRAZOLE SODIUM 40 MG: 40 TABLET, DELAYED RELEASE ORAL at 09:03

## 2019-07-12 RX ADMIN — HYDROCODONE BITARTRATE AND ACETAMINOPHEN 1 TABLET: 5; 325 TABLET ORAL at 09:02

## 2019-07-12 RX ADMIN — CITALOPRAM HYDROBROMIDE 40 MG: 20 TABLET ORAL at 09:02

## 2019-07-12 NOTE — DISCHARGE INSTRUCTIONS
Patient Discharge Instructions    [facility] / [id number] : 1970    Admitted 7/10/2019 Discharged: 2019         DISCHARGE DIAGNOSIS:      Likely  COPD exacerbation     Dehydration on admission - Lasix was on hold while you were in the hospital                                               Discuss with nephrology how you should use lasix now            Take Home Medications     General drug facts     If you have a very bad allergy, wear an allergy ID at all times. It is important that you take the medication exactly as they are prescribed. Keep your medication in the bottles provided by the pharmacist.  Keep a list of all your drugs (prescription, natural products, vitamins, OTC) with you. Give this list to your doctor. Do not take other medications without consulting your doctor. Do not share your drugs with others and do not take anyone else's drugs. Keep all drugs out of the reach of children and pets. Most drugs may be thrown away in household trash after mixing with coffee grounds or lynette litter and sealing in a plastic bag. Keep a list Call your doctor for help with any side effects. If in the [place] you may also call the FDA at 0-446-IXZ-3786    Talk with the doctor before starting any new drug, including OTC, natural products, or vitamins. What to do at Home    1. Recommended diet: diabetic     2. Recommended activity: Activity as tolerated    3. If you experience any of the following symptoms then please call your primary care physician or return to the emergency room if you cannot get hold of your doctor: fever/chills / dyspnea    4. Stop smocking !!!!!!!!!    5. Bring these papers with you to your follow up appointments.  The papers will help your doctors be sure to continue the care plan from the hospital.      Follow-up with:   PCP: Jessica Reed, DO  Follow-up Information     Follow up With Specialties Details Why Contact Vik Denney III, DO Internal Medicine In 1 week  [address] [facility]  [phone]      [facility]  In 1 week  [facility] [address]           Please call for your own appointment        Information obtained by :  I understand that if any problems occur once I am at home I am to contact my physician. I understand and acknowledge receipt of the instructions indicated above.                                                                                                                                            Physician's or R.N.'s Signature                                                                  Date/Time                                                                                                                                              Patient or Representative Signature                                                          Date/Time

## 2019-07-12 NOTE — DISCHARGE SUMMARY
Hospitalist Discharge Summary     Patient ID:  Liz Holliday  [id number]  12 y.o.  1970  7/10/2019    PCP on record: Ghanshyam Wright DO    Admit date: 7/10/2019  Discharge date and time: 7/12/2019    DISCHARGE DIAGNOSIS:    Acute hypoxemic respiratory failure most likely secondary to COPD exacerbation with acute bronchitis  DEBORAH POA resolved   CKD stage III   hyponatremia   DM type I   Hypertension  Neuropathy  Psych disorders/Posttraumatic stress disorder  History of gastric ulcer  Hyperlipidemia  Tobacco abuse  Code Status: Full       CONSULTATIONS:  IP CONSULT TO HOSPITALIST  IP CONSULT TO ENDOCRINOLOGY    Excerpted HPI from H&P of Todd Powell MD:    HISTORY OF PRESENT ILLNESS:     years old female from home with past medical history significant for DM type II, tobacco abuse, hypertension, hyperlipidemia presents to the hospital for evaluation of worsening shortness of breath associated with dry cough, associated with severe hyperglycemia patient stated she called her endocrinologist and he asked her to come to the ED for further evaluation, patient reports giving herself 30 units of insulin 2-hour before arrival to ED blood work in ED show blood glucose 196, CT chest was done and show no acute abnormality.     We were asked to admit for work up and evaluation of the above problems.            ______________________________________________________________________  DISCHARGE SUMMARY/HOSPITAL COURSE:  for full details see H&P, daily progress notes, labs, consult notes.        Acute hypoxemic respiratory failure most likely secondary to COPD exacerbation with acute bronchitis  doing well, no more dyspnea   O2: stable on RA   -s/p IV  steroids   -DC on albuterol MDI   -AB:  IV CTX/zithromax --> Augmentin PO, total 5 days   - refer to pulmonology OP  Need PFTs       DEBORAH POA on CKD stage III / hyponatremia   -baseline Cr ~ 1.2, admission 1.66--> 1.05 with fluids   Was holding lasix /arb   Staring back on cozaar   -scheduled to see nephrology today     DM type I   -BS ~ 300  -appreciated help of Dr Wiliam Collins with diabetes management   Back on insulin pump     Hypertension  -BP stable  Start back lasix and cozaar   No lasix this am       Neuropathy, cont Neurontin   Psych disorders/Posttraumatic stress disorder. Continue home medication  History of gastric ulcer. Start patient on Pepcid 20 mg twice daily  Hyperlipidemia, continue Lipitor 40 mg daily  Tobacco abuse, nicotine patch            Code Status: Full   Surrogate Decision Maker:       Baseline: independent   Recommended Disposition: Home w/Family       _______________________________________________________________________  Patient seen and examined by me on discharge day. PHYSICAL EXAM:  General:          WD, WN. Alert, cooperative, no acute distress    EENT:              EOMI. Anicteric sclerae. MMM  Resp:               CTA bilaterally, no wheezing or rales. No accessory muscle use  CV:                  Regular  rhythm,  No edema  GI:                   Soft, Non distended, Non tender.  +Bowel sounds  Neurologic:      Alert and oriented X 3, normal speech,   Psych:             Good insight. + very anxious, not agitated  Skin:                No rashes. No jaundice         _______________________________________________________________________  DISCHARGE MEDICATIONS:   Current Discharge Medication List      START taking these medications    Details   albuterol (PROVENTIL HFA, VENTOLIN HFA, PROAIR HFA) 90 mcg/actuation inhaler Take 1-2 Puffs by inhalation every four (4) hours as needed for Wheezing or Shortness of Breath. Qty: 1 Inhaler, Refills: 0      amoxicillin-clavulanate (AUGMENTIN) 875-125 mg per tablet Take 1 Tab by mouth every twelve (12) hours for 4 days. Qty: 8 Tab, Refills: 0      oxyCODONE-acetaminophen (PERCOCET) 5-325 mg per tablet Take 1 Tab by mouth every eight (8) hours as needed for Pain for up to 3 days.  Max Daily Amount: 3 Tabs. Indications: Pain  Qty: 10 Tab, Refills: 0    Associated Diagnoses: Acute bronchitis, unspecified organism         CONTINUE these medications which have NOT CHANGED    Details   insulin pump (PATIENT SUPPLIED) misc by SubCUTAneous route as needed. !! furosemide (LASIX) 20 mg tablet Take 40 mg by mouth daily. Patient takes 60 mg in the morning and 40 mg in the afternoon. HYDROcodone-acetaminophen (NORCO)  mg tablet Take 1 Tab by mouth. carvedilol (COREG) 25 mg tablet Take 6.25 mg by mouth daily. LORazepam (ATIVAN) 2 mg tablet Take 2 mg by mouth four (4) times daily. losartan (COZAAR) 25 mg tablet Take 6.25 mg by mouth daily. citalopram (CELEXA) 20 mg tablet Take 40 mg by mouth daily. traZODone (DESYREL) 50 mg tablet Take 100 mg by mouth nightly. !! insulin lispro (HUMALOG) 100 unit/mL injection Use as directed for insulin pump. Max 100 units per day--BILL MEDICARE PART B--DX E10.65--REPLACES NOVOLOG  Qty: 3 Vial, Refills: 11      !! furosemide (LASIX) 20 mg tablet Take 60 mg by mouth daily. Patient takes 60 mg in the morning and 40 mg in the afternoon. glucagon (GLUCAGON EMERGENCY KIT, HUMAN,) 1 mg injection USE A DIRECTED  Qty: 2 Vial, Refills: 11      esomeprazole (NEXIUM) 40 mg capsule TAKE ONE CAPSULE BY MOUTH DAILY  Qty: 30 Cap, Refills: 8      gabapentin (NEURONTIN) 300 mg capsule TAKE TWO CAPSULES BY MOUTH THREE TIMES A DAY  Qty: 180 Cap, Refills: 11      atorvastatin (LIPITOR) 40 mg tablet Take 1 Tab by mouth daily. Qty: 90 Tab, Refills: 3      levonorgestrel (MIRENA) 20 mcg/24 hr (5 years) IUD 1 Device by IntraUTERine route once. ARIPiprazole (ABILIFY) 30 mg tablet Take 30 mg by mouth daily. fentaNYL (DURAGESIC) 100 mcg/hr PATCH 1 Patch by TransDERmal route every seventy-two (72) hours.       !! insulin lispro (HUMALOG) 100 unit/mL injection sample  Qty: 2 Vial, Refills: 0      CONTOUR NEXT TEST STRIPS strip Use as directed to test up to 8 x per day,  Uncontrolled Type 1 diabetes with Neuropathy (HCC) E10.40, E10.65  Qty: 250 Strip, Refills: 11      Insulin Syringe-Needle U-100 0.5 mL 31 gauge x 5/16\" syrg Use as directed up to 4 times daily  Qty: 100 Syringe, Refills: 11      Insulin Needles, Disposable, (BD ULTRA-FINE MINI PEN NEEDLE) 31 gauge x 3/16\" ndle Use as directed up to 8 times daily  Qty: 200 Pen Needle, Refills: 11      FREESTYLE MARIELA SENSOR kit Use 1 sensor every 10 days as directed  Qty: 3 Each, Refills: 11    Associated Diagnoses: Diabetic nephropathy associated with type 1 diabetes mellitus ([address]       ! ! - Potential duplicate medications found. Please discuss with provider.             Follow-up Information     Follow up With Specialties Details Why Contact Vik Wright, DO Internal Medicine In 1 week  [address] [facility]  [phone]      [facility]  In 1 week  [facility] [id number]        ________________________________________________________________    Risk of deterioration: Low    Condition at Discharge:  Stable  __________________________________________________________________    Disposition  Home with family, no needs    ____________________________________________________________________    Code Status: Full Code  ___________________________________________________________________      Total time in minutes spent coordinating this discharge (includes going over instructions, follow-up, prescriptions, and preparing report for sign off to her PCP) :  > 30 minutes    Signed:  Al Rodríguez MD

## 2019-07-12 NOTE — ROUTINE PROCESS
PCP LANE apt scheduled with Dr. Perry Howell on 8/13/19(earliest avail apt) at 10:30AM. Called practice and left voicemail requesting assistance with receiving earlier appointment.   Apt added to AVS.

## 2019-07-12 NOTE — PROGRESS NOTES
LANE plan: Pt will discharge home today, transported by her boyfriend in a personal vehicle. Pt will receive a visit from [address] [place] [address] on 7/14/19. Pt is scheduled to follow-up with his PCP on 8/13/19. Pt will need to schedule an appt with her Pulmonologist in 1-2 weeks. Pt verbalized understanding of the plan. No further concerns indicated at this time. Care Management Interventions  PCP Verified by CM: Yes  Mode of Transport at Discharge: Other (see comment)(boyfriend)  Transition of Care Consult (CM Consult):  Other(Dispatch Health and f/u appts)  MyChart Signup: No  Discharge Durable Medical Equipment: No  Physical Therapy Consult: Yes  Occupational Therapy Consult: No  Speech Therapy Consult: No  Current Support Network: Lives with Caregiver(Lives with BF @  [phone] [facility] ,[address] [place] [address])  Confirm Follow Up Transport: Self  Plan discussed with Pt/Family/Caregiver: Yes  Discharge Location  Discharge Placement[place] [address] [place] [address], [facility]  Care Manager  [phone]

## 2019-07-12 NOTE — PROGRESS NOTES
Patient discharged. Pt given follow-up instructions, medications list and prescriptions. Pt able to demonstrate understanding of discharge instructions. Pt IV's and Telemonitor removed. Pt left with all personal belongings. Pt escorted off unit via wheelchair by volunteer.

## 2019-07-12 NOTE — PROGRESS NOTES
Bedside and Verbal shift [facility] Report included the following information SBAR, Kardex and Recent Results. Bedside and Verbal shift change report GIVEN TO Constantino Brennan RN. Report included the following information SBAR, Kardex and Recent Results. SIGNIFICANT CHANGES DURING SHIFT:       CONCERNS TO ADDRESS WITH MD:            [facility] NURSING NOTE   Admission Date 7/10/2019   Admission Diagnosis COPD exacerbation ([address] [J44.1]   Consults IP CONSULT TO HOSPITALIST  IP CONSULT TO ENDOCRINOLOGY      Cardiac Monitoring [x] Yes [] No      Purposeful Hourly Rounding [x] Yes    Deborah Score Total Score: 2   Deborah score 3 or > [] Bed Alarm [] Avasys [] 1:1 sitter [] Patient refused (Signed refusal form in chart)   Joey Score Joey Score: 20   Joey score 14 or < [] PMT consult [] Wound Care consult    []  Specialty bed  [] Nutrition consult      Influenza Vaccine Received Flu Vaccine for Current Season (usually Sept-March): Not Flu Season           Oxygen needs? [x] Room air Oxygen @  []1L    []2L    []3L   []4L    []5L   []6L via  NC   Chronic home O2 use?  [] Yes [x] No  Perform O2 challenge test and document in progress note using smartphHuiyuane (.Homeoxygen)      Last bowel movement Last Bowel Movement Date: 07/10/19      Urinary Catheter             LDAs               Peripheral IV 07/10/19 Left Antecubital (Active)   Site Assessment Clean, dry, & intact 7/12/2019  2:09 AM   Phlebitis Assessment 0 7/12/2019  2:09 AM   Infiltration Assessment 0 7/12/2019  2:09 AM   Dressing Status Clean, dry, & intact 7/12/2019  2:09 AM   Dressing Type Transparent 7/12/2019  2:09 AM   Hub Color/Line Status Infusing;Pink 7/12/2019  2:09 AM   Action Taken Blood drawn 7/10/2019  8:44 PM       Subcutaneous Insulin Infusion Device 07/11/19 (Active)   Site Assessment Clean, dry, & intact 7/11/2019  7:59 PM   Date of Last Site Change 07/11/19 7/11/2019  7:59 PM   Date of Last Set Change 07/11/19 7/11/2019 4:00 PM   Dressing Status Clean, dry, & intact 7/11/2019  7:59 PM   Dressing Type Transparent 7/11/2019  7:59 PM   Hub Color/Line Status Other (comment) 7/11/2019  7:59 PM   Patient able to care for pump? Yes 7/11/2019  7:59 PM   Was pump agreement signed? Yes 7/11/2019  7:59 PM   Patient reported basal rate  1.6 7/12/2019  4:29 AM   Usual carb ratio (per pt report) 4.5 units  7/12/2019  4:29 AM   Usual correction factor (per pt report) 3.5 units  7/12/2019  4:29 AM                         Readmission Risk Assessment Tool Score Medium Risk            18       Total Score        3 Has Seen PCP in Last 6 Months (Yes=3, No=0)    2 . Living with Significant Other. Assisted Living. LTAC. SNF. or   Rehab    5 Pt.  Coverage (Medicare=5 , Medicaid, or Self-Pay=4)    8 Charlson Comorbidity Score (Age + Comorbid Conditions)        Criteria that do not apply:    Patient Length of Stay (>5 days = 3)    IP Visits Last 12 Months (1-3=4, 4=9, >4=11)       Expected Length of Stay 3d 19h   Actual Length of Stay 2

## 2019-07-12 NOTE — PROGRESS NOTES
Bedside shift change report GIVEN TO , RN. Report included the following information SBAR. SIGNIFICANT CHANGES DURING SHIFT:    0700   Received report from Dominga Jiang, [address] MD:            [facility] NURSING NOTE   Admission Date 7/10/2019   Admission Diagnosis COPD exacerbation ([address] [J44.1]   Consults IP CONSULT TO HOSPITALIST  IP CONSULT TO ENDOCRINOLOGY      Cardiac Monitoring [x] Yes [] No      Purposeful Hourly Rounding [x] Yes    Deborah Score Total Score: 1   Deborah score 3 or > [] Bed Alarm [] Avasys [] 1:1 sitter [] Patient refused (Signed refusal form in chart)   Joey Score Joey Score: 22   Joey score 14 or < [] PMT consult [] Wound Care consult    []  Specialty bed  [] Nutrition consult      Influenza Vaccine Received Flu Vaccine for Current Season (usually Sept-March): Not Flu Season           Oxygen needs? [x] Room air Oxygen @  []1L    []2L    []3L   []4L    []5L   []6L via  NC   Chronic home O2 use? [] Yes [] No  Perform O2 challenge test and document in progress note using smartphrase (.Homeoxygen)      Last bowel movement Last Bowel Movement Date: 07/10/19      Urinary Catheter             LDAs               Peripheral IV 07/10/19 Left Antecubital (Active)   Site Assessment Clean, dry, & intact 7/12/2019  7:30 AM   Phlebitis Assessment 0 7/12/2019  7:30 AM   Infiltration Assessment 0 7/12/2019  7:30 AM   Dressing Status Clean, dry, & intact 7/12/2019  7:30 AM   Dressing Type Transparent 7/12/2019  7:30 AM   Hub Color/Line Status Pink; Infusing 7/12/2019  7:30 AM   Action Taken Blood drawn 7/10/2019  8:44 PM       Subcutaneous Insulin Infusion Device 07/11/19 (Active)   Site Assessment Clean, dry, & intact 7/12/2019  7:30 AM   Date of Last Site Change 07/11/19 7/12/2019  7:30 AM   Date of Last Set Change 07/11/19 7/11/2019  4:00 PM   Dressing Status Clean, dry, & intact 7/11/2019  7:59 PM   Dressing Type Transparent 7/11/2019  7:59 PM   Hub Color/Line Status Other (comment) 7/11/2019  7:59 PM   Patient able to care for pump? Yes 7/11/2019  7:59 PM   Was pump agreement signed? Yes 7/12/2019  7:30 AM   Patient reported basal rate  1.6 7/12/2019  4:29 AM   Usual carb ratio (per pt report) 4.5 units  7/12/2019  4:29 AM   Usual correction factor (per pt report) 3.5 units  7/12/2019  4:29 AM                         Readmission Risk Assessment Tool Score Medium Risk            18       Total Score        3 Has Seen PCP in Last 6 Months (Yes=3, No=0)    2 . Living with Significant Other. Assisted Living. LTAC. SNF. or   Rehab    5 Pt.  Coverage (Medicare=5 , Medicaid, or Self-Pay=4)    8 Charlson Comorbidity Score (Age + Comorbid Conditions)        Criteria that do not apply:    Patient Length of Stay (>5 days = 3)    IP Visits Last 12 Months (1-3=4, 4=9, >4=11)       Expected Length of Stay 3d 19h   Actual Length of Stay 2

## 2019-07-12 NOTE — PROGRESS NOTES
Endocrinology Progress Note    Came by to see patient this morning and despite not receiving any steroids overnight, her pilo from 164 on her bmp at 1am to 330 this morning and she states she did not eat anything in the middle of the night. She is feeling well this morning and ready for discharge to go see Dr. Gemini Machado at 10am.  I provided her with 2 sample vials of humalog as she had thrown out 2 vials in case they were bad insulin. I showed her how to use a temporary basal of 130% and to run this for 24 hours for the next 5 days while finishing a course of oral augmentin and she feels comfortable resetting this every morning the next few days. She is stable for discharge from my perspective and I have tiger texted Dr. Charles Benton about this. Data reviewed:    Component      Latest Ref Rng & Units 7/12/2019           1:56 AM   Sodium      136 - 145 mmol/L 127 (L)   Potassium      3.5 - 5.1 mmol/L 3.6   Chloride      97 - 108 mmol/L 93 (L)   CO2      21 - 32 mmol/L 28   Anion gap      5 - 15 mmol/L 6   Glucose      65 - 100 mg/dL 164 (H)   BUN      6 - 20 MG/DL 22 (H)   Creatinine      0.55 - 1.02 MG/DL 1.05 (H)   BUN/Creatinine ratio      12 - 20   21 (H)   GFR est AA      >60 ml/min/1.73m2 >60   GFR est non-AA      >60 ml/min/1.73m2 56 (L)   Calcium      8.5 - 10.1 MG/DL 8.4 (L)     Assessment/Plan:  1) Type 1 diabetes uncontrolled: A1c 9.3% on admission. Unclear exactly what precipitated this episode of severe hyperglycemia but possibly an underlying lung infection such as bronchitis could be to blame. The temporary basal of 130% should work well to control hyperglycemia while on antibiotics.   - cont current pump settings aside from temp basal of 130% while on abs     I spent 15 minutes of face to face time on her case and > 50% of the time was spent reviewing the chart and coordinating her care with Dr. Charles Betnon and the nurse caring for her.     Please don't hesitate to call [phone] with further questions.  General MD Zain     Copy sent to:  Dr. Lauren Almeida

## 2019-07-12 NOTE — PROGRESS NOTES
Reason for Admission:   COPD                  RRAT Score:  18                Do you (patient/family) have any concerns for transition/discharge? No              Plan for utilizing home health:  No     Current Advanced Directive/Advance Care Plan:  Full code            Transition of Care Plan:     Home with PCP,nephrology and pulmonology follow up    Care Management Interventions  PCP Verified by CM:  Yes  Mode of Transport at Discharge: Self  Discharge Durable Medical Equipment: No(very independent.)  Physical Therapy Consult: Yes  Current Support Network: Lives with Caregiver(Lives with BF @ [address] [place] [address] ,[address] [place] [address])  Confirm Follow Up Transport: Friends  Plan discussed with Pt/Family/Caregiver: Yes  Discharge Location  Discharge Placement: [address] [place] [address]  ED Case Manager   Fpy [phone]

## 2019-07-12 NOTE — PROGRESS NOTES
Problem: Falls - Risk of  Goal: *Absence of Falls  Description  Document Sendy Mendosa Fall Risk and appropriate interventions in the flowsheet. Outcome: Resolved/Met     Problem: Chronic Obstructive Pulmonary Disease (COPD)  Goal: *Oxygen saturation during activity within specified parameters  Outcome: Resolved/Met  Goal: *Able to remain out of bed as prescribed  Outcome: Resolved/Met  Goal: *Absence of hypoxia  Outcome: Resolved/Met  Goal: *Optimize nutritional status  Outcome: Resolved/Met     Problem: Diabetes Self-Management  Goal: *Disease process and treatment process  Description  Define diabetes and identify own type of diabetes; list 3 options for treating diabetes. Outcome: Resolved/Met  Goal: *Incorporating nutritional management into lifestyle  Description  Describe effect of type, amount and timing of food on blood glucose; list 3 methods for planning meals. Outcome: Resolved/Met  Goal: *Incorporating physical activity into lifestyle  Description  State effect of exercise on blood glucose levels. Outcome: Resolved/Met  Goal: *Developing strategies to promote health/change behavior  Description  Define the ABC's of diabetes; identify appropriate screenings, schedule and personal plan for screenings. Outcome: Resolved/Met  Goal: *Using medications safely  Description  State effect of diabetes medications on diabetes; name diabetes medication taking, action and side effects. Outcome: Resolved/Met  Goal: *Monitoring blood glucose, interpreting and using results  Description  Identify recommended blood glucose targets  and personal targets. Outcome: Resolved/Met  Goal: *Prevention, detection, treatment of acute complications  Description  List symptoms of hyper- and hypoglycemia; describe how to treat low blood sugar and actions for lowering  high blood glucose level.   Outcome: Resolved/Met  Goal: *Prevention, detection and treatment of chronic complications  Description  Define the natural course of diabetes and describe the relationship of blood glucose levels to long term complications of diabetes.   Outcome: Resolved/Met  Goal: *Developing strategies to address psychosocial issues  Description  Describe feelings about living with diabetes; identify support needed and support network  Outcome: Resolved/Met  Goal: *Insulin pump training  Outcome: Resolved/Met  Goal: *Sick day guidelines  Outcome: Resolved/Met  Goal: *Patient Specific Goal (EDIT GOAL, INSERT TEXT)  Outcome: Resolved/Met     Problem: Patient Education: Go to Patient Education Activity  Goal: Patient/Family Education  Outcome: Resolved/Met

## 2019-07-12 NOTE — PROGRESS NOTES
Hospitalist Progress Note    NAME: Fco Schroeder   :  1970   MRN:  [id number]       Assessment / Plan:  Acute hypoxemic respiratory failure most likely secondary to COPD exacerbation with acute bronchitis  doing well, no more dyspnea   O2: stable on RA   -s/p IV  steroids   -DC on albuterol MDI   -AB:  IV CTX/zithromax --> Augmentin PO, total 5 days   - refer to pulmonology OP  Need PFTs      DEBORAH POA on CKD stage III / hyponatremia   -baseline Cr ~ 1.2, admission 1.66--> 1.05 with fluids   Was holding lasix /arb   Staring back on cozaar   -scheduled to see nephrology today     DM type I   -BS ~ 300  -appreciated help of Dr Roselia Sloan with diabetes management   Back on insulin pump     Hypertension  -BP stable  Start back lasix and cozaar   No lasix this am      Neuropathy, cont Neurontin   Psych disorders/Posttraumatic stress disorder. Continue home medication  History of gastric ulcer. Start patient on Pepcid 20 mg twice daily  Hyperlipidemia, continue Lipitor 40 mg daily  Tobacco abuse, nicotine patch          Code Status: Full   Surrogate Decision Maker:      Baseline: independent   Recommended Disposition: Home w/Family      Subjective:     Chief Complaint / Reason for Physician Visit: following bronchitis / DM type II   Doing well   BS 300s   O2: on RA , stable     Discussed with RN events overnight. Review of Systems:  Symptom Y/N Comments  Symptom Y/N Comments   Fever/Chills n   Chest Pain n    Poor Appetite    Edema     Cough    Abdominal Pain     Sputum    Joint Pain     SOB/HERNÁNDEZ n   Pruritis/Rash     Nausea/vomit    Tolerating PT/OT     Diarrhea    Tolerating Diet     Constipation    Other       Could NOT obtain due to:      Objective:     VITALS:   Last 24hrs VS reviewed since prior progress note.  Most recent are:  Patient Vitals for the past 24 hrs:   Temp Pulse Resp BP SpO2   19 0705 97.8 °F (36.6 °C) 69 20 149/84 100 %   19 0315 98.3 °F (36.8 °C) 68 16 130/59 100 % 07/11/19 2254 97.9 °F (36.6 °C) 74 16 119/58 92 %   07/11/19 1944 98.1 °F (36.7 °C) 67 16 159/81 98 %   07/11/19 1504 98.2 °F (36.8 °C) 89 17 141/72 95 %   07/11/19 1437     96 %   07/11/19 1135 98.2 °F (36.8 °C) 83 17 132/69 93 %   07/11/19 0835     98 %       Intake/Output Summary (Last 24 hours) at 7/12/2019 0821  Last data filed at 7/12/2019 0730  Gross per 24 hour   Intake 3066.25 ml   Output    Net 3066.25 ml        PHYSICAL EXAM:  General: WD, WN. Alert, cooperative, no acute distress    EENT:  EOMI. Anicteric sclerae. MMM  Resp:  CTA bilaterally, no wheezing or rales. No accessory muscle use  CV:  Regular  rhythm,  No edema  GI:  Soft, Non distended, Non tender.  +Bowel sounds  Neurologic:  Alert and oriented X 3, normal speech,   Psych:   Good insight. + very anxious, not agitated  Skin:  No rashes. No jaundice    Reviewed most current lab test results and cultures  YES  Reviewed most current radiology test results   YES  Review and summation of old records today    NO  Reviewed patient's current orders and MAR    YES  PMH/ reviewed - no change compared to H&P  ________________________________________________________________________  Care Plan discussed with:    Comments   Patient y    Family      RN y    Care Manager     Consultant  y Endocrinology                      Multidiciplinary team rounds were held today with , nursing, pharmacist and clinical coordinator. Patient's plan of care was discussed; medications were reviewed and discharge planning was addressed.      ________________________________________________________________________  Total NON critical care TIME:  35  Minutes    Total CRITICAL CARE TIME Spent:   Minutes non procedure based      Comments   >50% of visit spent in counseling and coordination of care y Dc coordination    ________________________________________________________________________  Dorina Vargas MD     Procedures: see electronic medical records for all procedures/Xrays and details which were not copied into this note but were reviewed prior to creation of Plan. LABS:  I reviewed today's most current labs and imaging studies.   Pertinent labs include:  Recent Labs     07/11/19  0212 07/10/19  1632   WBC 6.5 8.8   HGB 12.2 12.6   HCT 35.7 36.8    321     Recent Labs     07/12/19  0156 07/11/19  0212 07/10/19  1632   * 130* 130*   K 3.6 3.8 3.8   CL 93* 92* 92*   CO2 28 31 32   * 135* 171*   BUN 22* 30* 37*   CREA 1.05* 1.33* 1.66*   CA 8.4* 8.5 9.0   MG 1.8  --   --    PHOS 3.2  --   --    ALB  --   --  4.0   TBILI  --   --  0.4   SGOT  --   --  16   ALT  --   --  21       Signed: Dionne Rico MD

## 2019-07-15 ENCOUNTER — PATIENT OUTREACH (OUTPATIENT)
Dept: INTERNAL MEDICINE CLINIC | Age: 49
End: 2019-07-15

## 2019-07-15 NOTE — PROGRESS NOTES
Hospital Discharge Follow-Up      Date/Time:  7/15/2019 9:38 AM    Patient was admitted to [facility] on 7/10/19 and discharged on 7/12/19 for c/o worsening shortness of breath with associated dry cough. The physician discharge summary was available at the time of outreach. Patient was contacted within 1 business days of discharge. Top Challenges reviewed with the provider   - sodium level at discharge 127(L)    - per IP CM documentation, patient to schedule f/u appt with her Pulmonologist in 1-2 weeks ([facility]?), needs for PFTs. No Pulmonologist is noted to be listed on Care Team; unclear as to whether patient was previously followed by Pulmonology in Outpatient Setting, will need to clarify this with patient. - Hgb a1c 9.3%. Chart routed to Ambulatory CDE MIQUEL, Socorro Nguyen, as referral.    - Per [employer], multiple unsuccessful patient outreach attempts made over the past 2 days in order to schedule post-acute visit (tel # [phone]); [employer] has not been able to successfully reach patient to schedule/complete post-acute visit. NN provided Gerry Nunez with additional listed contact information (numerous telephone numbers) for patient. - per hospital discharge summary, start patient on Pepcid 20 mg twice daily for history of gastric ulcer; note that pepcid is not listed on hospital discharge medication list.    - Per IP team, patient scheduled to see nephrology 7/12/19 - pt to f/u with nephrology regarding how to take lasix (dehydrated on admission, lasix held during admission and resumed at discharge); no nephrologist listed on current care team, will need to f/u with patient regarding Nephrology Provider and update care team accordingly.      - Tobacco Use    - needs LANE appt with PCP scheduled for earlier date; currently has office visit scheduled for 8/13/19 for hospital follow-up     Method of communication with provider :chart routing    Inpatient RRAT score: 25  Was this a readmission? no   Patient stated reason for the readmission: n/a    Nurse Navigator (NN) attempted to contact the patient by telephone to perform post hospital discharge assessment; lvm requesting a return phone call to this NN. Disease Specific:   COPD    Summary of patient's top problems:  1. Acute hypoxemic respiratory failure most likely secondary to COPD exacerbation with acute bronchitis: portable chest xray completed 7/10, Chest CT completed 7/10 - no acute abnormality. stable on room air. WBC remained within normal limits throughout this admission - result of 8.8 on admit and 6.5 prior to discharge. s/p IV  steroids. Antibiotics - IV rocephin and IV zithromax this admission; discharged on oral Augmentin, total 5 days   Discharged on albuterol MDI     No IP Pulmonology consult noted. To be referred to Pulmonology as outpatient; needs PFTs    Current Active Smoker/ Tobacco Abuse. 2. DEBORAH, CKD Stage 3: baseline Cr ~ 1.2. Creatinine on admission 1.66, improved to 1.05 (result at discharge) with fluids . Sodium level 130(L) on admit, trended down to 127 (L) at discharge. Was holding lasix /arb   Lasix and Cozaar resumed. Per IP team, patient scheduled to see nephrology 7/12/19    3. DM Type 1, uncontrolled: Endocrinology consulted. Patient followed by Dr. Tino Box as outpatient; most recent office visit 6/20/19. Reported blood glucose level >600 mg/dL 7/9; per Dr. Tino Box, cause of this episode of severe hyperglycemia unclear, however is possibly related to underlying acute lung infection. Hgb a1c 9.3%; compare to previous result 5/21/19 of 10.1%(H). On insulin pump. Chart routed to Ambulatory CDE NN (by this NN), Sharifa Miranda, as referral.    Home Health orders at discharge: [address]: n/a  Date of initial visit: [facility] ordered/company: None  Durable Medical Equipment received: n/a    Barriers to care?  NN inability to reach patient today    Advance Care Planning:   Does patient have an Advance Directive:  not on file     Medication(s):   New Medications at Discharge: albuterol hfa, augmentin, percocet  Changed Medications at Discharge: none  Discontinued Medications at Discharge: none      [facility] follow up appointment(s):   Future Appointments   Date Time Provider Wei Jean   8/13/2019 10:30 AM [place], [address]   9/17/2019  9:10 AM Vivi Carter MD RDE [address]      Non-[facility] follow up appointment(s): Pulmonology in 1-2 weeks-pt to schedule  [facility]:  per IP CM documentation, pt to receive visit from [address] [place] [address] on 7/14/19       Goals     None

## 2019-07-16 ENCOUNTER — PATIENT OUTREACH (OUTPATIENT)
Dept: INTERNAL MEDICINE CLINIC | Age: 49
End: 2019-07-16

## 2019-07-16 NOTE — PROGRESS NOTES
Hospital Discharge Follow-Up      Date/Time:  2019 2:20 PM      Method of communication with provider :chart routing    Nurse Navigator (NN) contacted the patient by telephone to perform post hospital discharge assessment. Verified name and  with patient as identifiers. Provided introduction to self, and explanation of the Nurse Navigator role. Patient reports [facility] post-acute visit is scheduled for today. Patient requests that NN contact her in approximately 10-15 minutes due to blood glucose level of 32 mg/dL and reports that she is taking intervention to raise her blood sugar; patient has not contacted EMS and refuses to contact EMS, states, \"No, I don't need to. \"    2019  2:45 PM  NN telephone outreach to patient; two patient identifiers verified. Patient reports blood glucose is now 134 mg/dL; treated hypoglycemia with 8 oz fruit juice, cookies, also ate four peanut butter crackers. Patient reports [facility] Team is currently at her residence to perform post-acute follow-up; patient requests NN outreach on 19. [facility] follow up appointment(s):   Future Appointments   Date Time Provider Wei Jean   2019 10:30 AM Elidia Gonsalezjudd Gallo   2019  9:10 AM Celia Sarah MD RDE [address]  patient reports [facility] visit being completed today, 19.

## 2019-07-16 NOTE — PROGRESS NOTES
Was asked by [facility] nurse to see pt for diabetes. It is noted that [facility]. Pt stated she did not really see the need for diabetes education as she sees Dr. Leticia Mike who goes over meds. Pt stated she eats healthy and exercises. Advised pt that this certified diabetes educator would have more time to spend with her and that before or after her appt with Dr. Rigoberto Atwood would be a good time. Pt stated the nurse she talked to this morning is calling her back in the morning. LANE NN notified to contact this certified diabetes educator of pt's LANE appt date.      Hospital Problem List     COPD exacerbation [facility])     Care Timeline     07/10   Admitted from ED 2346   07/12   Discharged [id number]     Discharge:  Home or Self Care     Message   Received: Yesterday   Message Contents   ALVARO Pennington RN             Referral-a1c 9.3%

## 2019-07-17 ENCOUNTER — PATIENT OUTREACH (OUTPATIENT)
Dept: INTERNAL MEDICINE CLINIC | Age: 49
End: 2019-07-17

## 2019-07-17 NOTE — PROGRESS NOTES
NN attempted patient outreach in order to notify of scheduled LANE appt with PCP 7/18/19 at 3:30 pm; lvm requesting a return phone call to this NN.     Future Appointments:  Future Appointments   Date Time Provider Wei Jean   7/18/2019  3:30 PM Elidia Gonsalez   8/13/2019 10:30 AM Elidia Gonsalez   9/17/2019  9:10 AM Rosie Rice MD RDE [place] [address]        Last Appointment With Me:  Visit date not found     Last Appointment My Department:  2/26/2019

## 2019-07-17 NOTE — PROGRESS NOTES
NN outreach to patient today in order to perform post-hospital discharge assessment and medication reconciliation; two patient identifiers verified. Patient is not currently at home (resides with her boyfriend, however also has her own apartment-she is currently at her apartment and plans to return home to boyfriend's residence this evening); requests NN outreach on 7/18/19. Patient reports [facility] post-acute visit completed on 7/16/19.

## 2019-07-18 ENCOUNTER — PATIENT OUTREACH (OUTPATIENT)
Dept: INTERNAL MEDICINE CLINIC | Age: 49
End: 2019-07-18

## 2019-07-18 ENCOUNTER — OFFICE VISIT (OUTPATIENT)
Dept: INTERNAL MEDICINE CLINIC | Age: 49
End: 2019-07-18

## 2019-07-18 VITALS
OXYGEN SATURATION: 95 % | BODY MASS INDEX: 36.65 KG/M2 | RESPIRATION RATE: 20 BRPM | HEIGHT: 65 IN | TEMPERATURE: 98.7 F | WEIGHT: 220 LBS | SYSTOLIC BLOOD PRESSURE: 146 MMHG | DIASTOLIC BLOOD PRESSURE: 77 MMHG | HEART RATE: 79 BPM

## 2019-07-18 DIAGNOSIS — I10 ESSENTIAL HYPERTENSION: ICD-10-CM

## 2019-07-18 DIAGNOSIS — J44.1 COPD EXACERBATION (HCC): Primary | ICD-10-CM

## 2019-07-18 DIAGNOSIS — E66.01 SEVERE OBESITY (HCC): ICD-10-CM

## 2019-07-18 DIAGNOSIS — F41.9 ANXIETY AND DEPRESSION: ICD-10-CM

## 2019-07-18 DIAGNOSIS — F32.A ANXIETY AND DEPRESSION: ICD-10-CM

## 2019-07-18 DIAGNOSIS — E10.21 DIABETIC NEPHROPATHY ASSOCIATED WITH TYPE 1 DIABETES MELLITUS (HCC): ICD-10-CM

## 2019-07-18 DIAGNOSIS — Z72.0 TOBACCO ABUSE: ICD-10-CM

## 2019-07-18 DIAGNOSIS — F11.20 NARCOTIC DEPENDENCE (HCC): ICD-10-CM

## 2019-07-18 RX ORDER — IBUPROFEN 200 MG
1 TABLET ORAL EVERY 24 HOURS
Qty: 30 PATCH | Refills: 0 | Status: SHIPPED | OUTPATIENT
Start: 2019-07-18 | End: 2019-08-17

## 2019-07-18 NOTE — PROGRESS NOTES
Js Case is a 51 y.o. female who presents for evaluation of transition of care. Last seen by me jan 25, 2019. [facility], as well as anish and uncontrolled dm, type 1. Doing better now, though still smokes. Has appt with pulmonary next week. Friend with her today.       ROS:  Constitutional: negative for fevers, chills, anorexia and weight loss  Eyes:   negative for visual disturbance and irritation  ENT:   negative for tinnitus,sore throat,nasal congestion,ear pain,hoarseness  Respiratory:  negative for cough, hemoptysis, dyspnea,wheezing  CV:   negative for chest pain, palpitations, lower extremity edema  GI:   negative for nausea, vomiting, diarrhea, abdominal pain,melena  Genitourinary: negative for frequency, dysuria and hematuria  Musculoskel: negative for myalgias, arthralgias, back pain, muscle weakness, joint pain  Neurological:  negative for headaches, dizziness, focal weakness, numbness  Psychiatric:     Negative for depression or anxiety      Past Medical History:   Diagnosis Date    Achilles tendon rupture     along with torn right patellar/femoral ligament    Arthritis     rt. foot    Chronic kidney disease     Chronic pain     abdominal pain    Diabetes (HCC)     IDDM on insulin pump and sensor    DM type 1 (diabetes mellitus, type 1) ([facility] [address]     age 25    Endometriosis     s/p ex-lap 4x    Gastric ulcer     GERD (gastroesophageal reflux disease)     Herpes     Other and unspecified hyperlipidemia     Panic attacks     Psychiatric disorder     anxiety, panic attacks    PTSD (post-traumatic stress disorder)     Unspecified essential hypertension        Past Surgical History:   Procedure Laterality Date    HX COLONOSCOPY      HX GYN      2 d&c's    HX GYN      laparoscopies x5 for endometriosis    HX GYN      mirena in place    HX ORTHOPAEDIC  2002    achiles tendon repair,talus repair    HX ORTHOPAEDIC      injections x2 to right shoulder  HX ORTHOPAEDIC Left 02/07/2019    3rd trigger finger release       Family History   Problem Relation Age of Onset    Diabetes Mother         diet controlled    Diabetes Father         R foot amupation    Liver Disease Father     Heart Disease Paternal Uncle         MI in his 46s    Stroke Neg Hx        Social History     Socioeconomic History    Marital status: SINGLE     Spouse name: Not on file    Number of children: Not on file    Years of education: Not on file    Highest education level: Not on file   Occupational History    Not on file   Social Needs    Financial resource strain: Not on file    Food insecurity:     Worry: Not on file     Inability: Not on file    Transportation needs:     Medical: Not on file     Non-medical: Not on file   Tobacco Use    Smoking status: Current Some Day Smoker     Packs/day: 0.75     Years: 17.00     Pack years: 12.75     Types: Cigarettes    Smokeless tobacco: Current User    Tobacco comment: Trying to quit   Substance and Sexual Activity    Alcohol use: Yes     Comment: a beer every few months    Drug use: No     Types: OTC, Prescription    Sexual activity: Yes     Partners: Male   Lifestyle    Physical activity:     Days per week: Not on file     Minutes per session: Not on file    Stress: Not on file   Relationships    Social connections:     Talks on phone: Not on file     Gets together: Not on file     Attends Denominational service: Not on file     Active member of club or organization: Not on file     Attends meetings of clubs or organizations: Not on file     Relationship status: Not on file    Intimate partner violence:     Fear of current or ex partner: Not on file     Emotionally abused: Not on file     Physically abused: Not on file     Forced sexual activity: Not on file   Other Topics Concern    Not on file   Social History Narrative    Not on file            Visit Vitals  /77 (BP 1 Location: Right arm, BP Patient Position: Sitting) Pulse 79   Temp 98.7 °F (37.1 °C) (Oral)   Resp 20   Ht 5' 5\" (1.651 m)   Wt 220 lb (99.8 kg)   SpO2 95%   BMI 36.61 kg/m²       Physical Examination:   General - Well appearing female  HEENT - PERRL, TM no erythema/opacification, normal nasal turbinates, no oropharyngeal erythema or exudate, MMM  Neck - supple, no bruits, no thyroidomegaly, no lymphadenopathy  Pulm - clear to auscultation bilaterally--good air flow  Cardio - RRR, normal S1 S2, no murmur  Abd - soft, nontender, no masses, no HSM  Extrem - no edema, +2 distal pulses  Neuro-  No focal deficits, CN intact     Assessment/Plan:    1. Copd exac--no need for prednisone. Continue augmentin. rx for anoro. Has appt with pulm next week. 2.  htn--continue coreg, cozaar, lasix  3.  hyperlipids--on lipitor  4. Tobacco abuse--rx for nicotine patches  5. Dm, type 1--on insulin pump. Last a1c 9.3. Follows with dr Ansley Bolanos  6. Bipolar--on abilify, celexa, ativan  7.   Chronic pain--on norco, neurontin, fentanyl patch    rtc 6 months        Sd Dinh III, DO

## 2019-07-18 NOTE — PROGRESS NOTES
NN outreach to patient today in order to perform post-hospital discharge assessment and medication reconciliation; two patient identifiers verified. Patient is not currently at home, therefore unable to complete post-discharge assessment and med rec at this time. Notified patient of scheduled LANE appt with PCP for today; patient accepts this appointment. Patient is advised to bring all of her current medications, both prescription and OTC, to scheduled appointment today. NN will f/u with patient next week. Goals Addressed                 This Visit's Progress       Post Hospitalization     Prevent complications post hospitalization. 7/17/19  Patient reports [facility] post-acute visit completed on 7/16/19 7/18/2019  - Notified patient of scheduled LANE appt with PCP for today; patient will attend appointment.             Future Appointments:  Future Appointments   Date Time Provider Wei Jean   7/18/2019  3:30 PM Elidia Gonsalez   8/13/2019 10:30 AM Elidia Gonsalez   9/17/2019  9:10 AM Bruna Rogers MD RDE [place] [address]        Last Appointment With Me:  Visit date not found     Last Appointment My Department:  2/26/2019

## 2019-07-18 NOTE — PROGRESS NOTES
Reviewed record in preparation for visit and have obtained necessary documentation. Identified pt with two pt identifiers(name and ). Chief Complaint   Patient presents with   [facility] Follow Up     DM, SOB       Health Maintenance Due   Topic Date Due    COLONOSCOPY  2019    MICROALBUMIN Q1  2019       Ms. Kassandra Damon has a reminder for a \"due or due soon\" health maintenance. I have asked that she discuss health maintenance topic(s) due with Her  primary care provider. Coordination of Care Questionnaire:  :     1) Have you been to an emergency room, urgent care clinic since your last visit? no   Hospitalized since your last visit? yes             2) Have you seen or consulted any other health care providers outside of [address] [place] [address] since your last visit? no  (Include any pap smears or colon screenings in this section.)    3) Do you have an Advance Directive on file? no    4) Are you interested in receiving information on Advance Directives? NO    Patient is accompanied by self I have received verbal consent from Kiersten Morejon to discuss any/all medical information while they are present in the room.

## 2019-07-18 NOTE — PROGRESS NOTES
Pharmacy Progress Note - Medication/Device Education     S/O: Ms. Fco Schroeder is a 51 y.o., was referred by Cherelle Ramirez DO for medication/device teaching today. Medication/Product:  Gayatri Jacome     - Hx of using albuterol HFA. Past Medical History:   Diagnosis Date    Achilles tendon rupture     along with torn right patellar/femoral ligament    Arthritis     rt. foot    Chronic kidney disease     Chronic pain     abdominal pain    Diabetes (HCC)     IDDM on insulin pump and sensor    DM type 1 ([facility] mellitus, [facility] 1) ([facility]     age 25    Endometriosis     s/p ex-lap 4x    Gastric ulcer     GERD (gastroesophageal reflux disease)     Herpes     Other and unspecified hyperlipidemia     Panic attacks     Psychiatric disorder     anxiety, panic attacks    PTSD (post-traumatic stress disorder)     Unspecified essential hypertension      Allergies   Allergen Reactions    Zocor [Simvastatin] Myalgia    Lisinopril Cough       Current Outpatient Medications   Medication Sig    umeclidinium-vilanterol (ANORO ELLIPTA) 62.5-25 mcg/actuation inhaler Take 1 Puff by inhalation daily.  nicotine (NICODERM CQ) 14 mg/24 hr patch 1 Patch by TransDERmal route every twenty-four (24) hours for 30 days.  umeclidinium-vilanterol (ANORO ELLIPTA) 62.5-25 mcg/actuation inhaler Take 1 Puff by inhalation daily.  albuterol (PROVENTIL HFA, VENTOLIN HFA, PROAIR HFA) 90 mcg/actuation inhaler Take 1-2 Puffs by inhalation every four (4) hours as needed for Wheezing or Shortness of Breath.  insulin pump (PATIENT SUPPLIED) misc by SubCUTAneous route as needed.  HYDROcodone-acetaminophen (NORCO)  mg tablet Take 1 Tab by mouth.  carvedilol (COREG) 25 mg tablet Take 6.25 mg by mouth daily.  LORazepam (ATIVAN) 2 mg tablet Take 2 mg by mouth four (4) times daily.  losartan (COZAAR) 25 mg tablet Take 6.25 mg by mouth daily.     citalopram (CELEXA) 20 mg tablet Take 40 mg by mouth daily.  traZODone (DESYREL) 50 mg tablet Take 100 mg by mouth nightly.  insulin lispro (HUMALOG) 100 unit/mL injection Use as directed for insulin pump. Max 100 units per day--BILL MEDICARE PART B--DX E10.65--REPLACES NOVOLOG    CONTOUR NEXT TEST STRIPS strip Use as directed to test up to 8 x per day,  Uncontrolled Type 1 diabetes with Neuropathy (HCC) E10.40, E10.65    Insulin Syringe-Needle U-100 0.5 mL 31 gauge x 5/16\" syrg Use as directed up to 4 times daily    glucagon (GLUCAGON EMERGENCY KIT, HUMAN,) 1 mg injection USE A DIRECTED    esomeprazole (NEXIUM) 40 mg capsule TAKE ONE CAPSULE BY MOUTH DAILY    Insulin Needles, Disposable, (BD ULTRA-FINE MINI PEN NEEDLE) 31 gauge x 3/16\" ndle Use as directed up to 8 times daily    gabapentin (NEURONTIN) 300 mg capsule TAKE TWO CAPSULES BY MOUTH THREE TIMES A DAY    atorvastatin (LIPITOR) 40 mg tablet Take 1 Tab by mouth daily.  levonorgestrel (MIRENA) 20 mcg/24 hr (5 years) IUD 1 Device by IntraUTERine route once.  ARIPiprazole (ABILIFY) 30 mg tablet Take 30 mg by mouth daily.  fentaNYL (DURAGESIC) 100 mcg/hr PATCH 1 Patch by TransDERmal route every seventy-two (72) hours.  FREESTYLE MARIELA SENSOR kit Use 1 sensor every 10 days as directed    insulin lispro (HUMALOG) 100 unit/mL injection sample    furosemide (LASIX) 20 mg tablet Take 40 mg by mouth daily. Patient takes 60 mg in the morning and 40 mg in the afternoon.  furosemide (LASIX) 20 mg tablet Take 60 mg by mouth daily. Patient takes 60 mg in the morning and 40 mg in the afternoon. No current facility-administered medications for this visit. A/P:  - Reviewed difference between Anoro vs albuterol inhalers. - Reviewed product's side effects.   - Discussed medication's priming, administration techniques,storage, and disposal.   - Patient confirmed understanding using the teach back method. She gave her first dose in the office.      Resources/samples provided:   - Anoro Ellipta x 1    Patient verbalized understanding of the information presented and all of the patients questions were answered. AVS was handed to the patient. Patient advised to call the office with any additional questions or concerns. Notifications of recommendations will be sent to Dr. Catherine Cristobal DO for review.     Thank you for the consult,  Leslie Armendariz, TjD, CDE

## 2019-07-19 ENCOUNTER — DOCUMENTATION ONLY (OUTPATIENT)
Dept: INTERNAL MEDICINE CLINIC | Age: 49
End: 2019-07-19

## 2019-07-19 ENCOUNTER — TELEPHONE (OUTPATIENT)
Dept: INTERNAL MEDICINE CLINIC | Age: 49
End: 2019-07-19

## 2019-07-24 ENCOUNTER — PATIENT OUTREACH (OUTPATIENT)
Dept: INTERNAL MEDICINE CLINIC | Age: 49
End: 2019-07-24

## 2019-07-26 NOTE — PROGRESS NOTES
NN attempted patient outreach today in order to perform LANE follow-up; lvm requesting a return phone call to this NN. Goals Addressed                 This Visit's Progress       Post Hospitalization     Prevent complications post hospitalization. 7/17/19  Patient reports [facility] post-acute visit completed on 7/16/19 7/18/2019  - Notified patient of scheduled LANE appt with PCP for today; patient will attend appointment. 7/24/2019  - attended LANE appt with PCP 7/18/19; new order for anoro ellipta, nicotine patch, f/u with PCP in 6 months for routine care. Was also seen by Ambulatory PharmD during office visit and education on proper use of anoro ellipta. 7/26/2019  - per [facility], patient attended office visit with Dr. Christie Yoder on 7/22/19; no future f/u scheduled at this time. Patient to f/u in 4 months. - need to f/u with patient regarding outpatient nephrology follow-up; per IP Team notes, patient was scheduled to f/u with [facility] on 7/12/19, however NN was unable to find documentation of OP Nephrology Provider that follows patient.           Future Appointments:  Future Appointments   Date Time Provider Wei Jean   8/13/2019 10:30 AM [facility] [address]   9/17/2019  9:10 AM Rohan Mei MD [facility] [address]        Last Appointment With Me:  Visit date not found     Last Appointment My Department:  7/18/2019

## 2019-08-08 ENCOUNTER — PATIENT OUTREACH (OUTPATIENT)
Dept: INTERNAL MEDICINE CLINIC | Age: 49
End: 2019-08-08

## 2019-08-08 NOTE — PROGRESS NOTES
NN attempted patient outreach today in order to perform LANE follow-up; lvm requesting a return phone call to this NN.

## 2019-08-13 ENCOUNTER — PATIENT OUTREACH (OUTPATIENT)
Dept: INTERNAL MEDICINE CLINIC | Age: 49
End: 2019-08-13

## 2019-08-13 NOTE — PROGRESS NOTES
NN attempted patient outreach today in order to perform LANE follow-up; lvm requesting a return phone call to this NN.       No patient response to most recent NN telephonic outreach attempts; NN will perform one additional outreach attempt in approximately two weeks, if no patient response then will resolve Episode. Goals Addressed                 This Visit's Progress       Post Hospitalization     Prevent complications post hospitalization. On track     7/17/19  Patient reports [facility] visit completed on 7/16/19 7/18/2019  - Notified patient of scheduled LANE appt with PCP for today; patient will attend appointment. 7/24/2019  - attended LANE appt with PCP 7/18/19; new order for anoro ellipta, nicotine patch, f/u with PCP in 6 months for routine care. Was also seen by Ambulatory PharmD during office visit and education on proper use of anoro ellipta. 7/26/2019  - per [facility], patient attended office visit with Dr. Cata Reno on 7/22/19; no future f/u scheduled at this time. Patient to f/u in 4 months. - need to f/u with patient regarding outpatient nephrology follow-up; per IP Team notes, patient was scheduled to f/u with [facility] on 7/12/19, however NN was unable to find documentation of OP Nephrology Provider that follows patient. 8/13/2019  - To the best of this NN's knowledge, this patient had no additional Inpatient Hospital Admissions during 30 day LANE period following admission to [facility] 7/10/19-7/12/19.   - LANE period has ended. Goal Completed.                       Future Appointments:  Future Appointments   Date Time Provider Wei Jean   9/17/2019  9:10 AM Erika Diaz MD [facility] [address]        Last Appointment With Me:  Visit date not found     Last Appointment My Department:  7/18/2019

## 2019-08-21 ENCOUNTER — APPOINTMENT (OUTPATIENT)
Dept: GENERAL RADIOLOGY | Age: 49
DRG: 917 | End: 2019-08-21
Attending: EMERGENCY MEDICINE
Payer: MEDICARE

## 2019-08-21 ENCOUNTER — HOSPITAL ENCOUNTER (INPATIENT)
Age: 49
LOS: 2 days | Discharge: HOME | DRG: 917 | End: 2019-08-23
Attending: EMERGENCY MEDICINE | Admitting: INTERNAL MEDICINE
Payer: MEDICARE

## 2019-08-21 DIAGNOSIS — N17.9 AKI (ACUTE KIDNEY INJURY) (HCC): Primary | ICD-10-CM

## 2019-08-21 DIAGNOSIS — T40.601A OPIATE OVERDOSE, ACCIDENTAL OR UNINTENTIONAL, INITIAL ENCOUNTER (HCC): ICD-10-CM

## 2019-08-21 PROBLEM — T50.901A DRUG OVERDOSE: Status: ACTIVE | Noted: 2019-08-21

## 2019-08-21 PROBLEM — R41.82 CHANGE IN MENTAL STATUS: Status: ACTIVE | Noted: 2019-08-21

## 2019-08-21 LAB
ALBUMIN SERPL-MCNC: 4 G/DL (ref 3.5–5)
ALBUMIN/GLOB SERPL: 1.2 {RATIO} (ref 1.1–2.2)
ALP SERPL-CCNC: 103 U/L (ref 45–117)
ALT SERPL-CCNC: 28 U/L (ref 12–78)
AMPHET UR QL SCN: NEGATIVE
ANION GAP SERPL CALC-SCNC: 7 MMOL/L (ref 5–15)
ARTERIAL PATENCY WRIST A: ABNORMAL
AST SERPL-CCNC: 58 U/L (ref 15–37)
BARBITURATES UR QL SCN: NEGATIVE
BASE EXCESS BLDV CALC-SCNC: 5 MMOL/L
BASOPHILS # BLD: 0 K/UL (ref 0–0.1)
BASOPHILS NFR BLD: 0 % (ref 0–1)
BDY SITE: ABNORMAL
BENZODIAZ UR QL: NEGATIVE
BILIRUB SERPL-MCNC: 0.5 MG/DL (ref 0.2–1)
BUN SERPL-MCNC: 53 MG/DL (ref 6–20)
BUN/CREAT SERPL: 23 (ref 12–20)
CALCIUM SERPL-MCNC: 9.2 MG/DL (ref 8.5–10.1)
CANNABINOIDS UR QL SCN: NEGATIVE
CHLORIDE SERPL-SCNC: 96 MMOL/L (ref 97–108)
CO2 SERPL-SCNC: 28 MMOL/L (ref 21–32)
COCAINE UR QL SCN: NEGATIVE
CREAT SERPL-MCNC: 2.34 MG/DL (ref 0.55–1.02)
DIFFERENTIAL METHOD BLD: ABNORMAL
DRUG SCRN COMMENT,DRGCM: ABNORMAL
EOSINOPHIL # BLD: 0.1 K/UL (ref 0–0.4)
EOSINOPHIL NFR BLD: 1 % (ref 0–7)
ERYTHROCYTE [DISTWIDTH] IN BLOOD BY AUTOMATED COUNT: 15.9 % (ref 11.5–14.5)
GAS FLOW.O2 O2 DELIVERY SYS: ABNORMAL L/MIN
GAS FLOW.O2 SETTING OXYMISER: 4 L/M
GLOBULIN SER CALC-MCNC: 3.4 G/DL (ref 2–4)
GLUCOSE BLD STRIP.AUTO-MCNC: 157 MG/DL (ref 65–100)
GLUCOSE SERPL-MCNC: 144 MG/DL (ref 65–100)
HCG UR QL: NEGATIVE
HCO3 BLDV-SCNC: 30.8 MMOL/L (ref 23–28)
HCT VFR BLD AUTO: 35.5 % (ref 35–47)
HGB BLD-MCNC: 11.8 G/DL (ref 11.5–16)
IMM GRANULOCYTES # BLD AUTO: 0 K/UL (ref 0–0.04)
IMM GRANULOCYTES NFR BLD AUTO: 0 % (ref 0–0.5)
LYMPHOCYTES # BLD: 2.8 K/UL (ref 0.8–3.5)
LYMPHOCYTES NFR BLD: 26 % (ref 12–49)
MCH RBC QN AUTO: 28.9 PG (ref 26–34)
MCHC RBC AUTO-ENTMCNC: 33.2 G/DL (ref 30–36.5)
MCV RBC AUTO: 87 FL (ref 80–99)
METHADONE UR QL: NEGATIVE
MONOCYTES # BLD: 1 K/UL (ref 0–1)
MONOCYTES NFR BLD: 9 % (ref 5–13)
NEUTS SEG # BLD: 7 K/UL (ref 1.8–8)
NEUTS SEG NFR BLD: 64 % (ref 32–75)
NRBC # BLD: 0 K/UL (ref 0–0.01)
NRBC BLD-RTO: 0 PER 100 WBC
OPIATES UR QL: POSITIVE
PCO2 BLDV: 55.9 MMHG (ref 41–51)
PCP UR QL: NEGATIVE
PH BLDV: 7.35 [PH] (ref 7.32–7.42)
PLATELET # BLD AUTO: 301 K/UL (ref 150–400)
PMV BLD AUTO: 9.7 FL (ref 8.9–12.9)
PO2 BLDV: 56 MMHG (ref 25–40)
POTASSIUM SERPL-SCNC: 3.9 MMOL/L (ref 3.5–5.1)
PROT SERPL-MCNC: 7.4 G/DL (ref 6.4–8.2)
RBC # BLD AUTO: 4.08 M/UL (ref 3.8–5.2)
SAO2 % BLDV: 86 % (ref 65–88)
SERVICE CMNT-IMP: ABNORMAL
SODIUM SERPL-SCNC: 131 MMOL/L (ref 136–145)
SPECIMEN TYPE: ABNORMAL
TOTAL RESP. RATE, ITRR: 20
WBC # BLD AUTO: 10.9 K/UL (ref 3.6–11)

## 2019-08-21 PROCEDURE — 99285 EMERGENCY DEPT VISIT HI MDM: CPT

## 2019-08-21 PROCEDURE — 82803 BLOOD GASES ANY COMBINATION: CPT

## 2019-08-21 PROCEDURE — 96374 THER/PROPH/DIAG INJ IV PUSH: CPT

## 2019-08-21 PROCEDURE — 65270000029 HC RM PRIVATE

## 2019-08-21 PROCEDURE — 85025 COMPLETE CBC W/AUTO DIFF WBC: CPT

## 2019-08-21 PROCEDURE — 81025 URINE PREGNANCY TEST: CPT

## 2019-08-21 PROCEDURE — 71046 X-RAY EXAM CHEST 2 VIEWS: CPT

## 2019-08-21 PROCEDURE — 93005 ELECTROCARDIOGRAM TRACING: CPT

## 2019-08-21 PROCEDURE — 82962 GLUCOSE BLOOD TEST: CPT

## 2019-08-21 PROCEDURE — 80053 COMPREHEN METABOLIC PANEL: CPT

## 2019-08-21 PROCEDURE — 96375 TX/PRO/DX INJ NEW DRUG ADDON: CPT

## 2019-08-21 PROCEDURE — 80307 DRUG TEST PRSMV CHEM ANLYZR: CPT

## 2019-08-21 PROCEDURE — 74011250636 HC RX REV CODE- 250/636: Performed by: EMERGENCY MEDICINE

## 2019-08-21 PROCEDURE — 36415 COLL VENOUS BLD VENIPUNCTURE: CPT

## 2019-08-21 RX ORDER — NALOXONE HYDROCHLORIDE 1 MG/ML
0.4 INJECTION INTRAMUSCULAR; INTRAVENOUS; SUBCUTANEOUS
Status: COMPLETED | OUTPATIENT
Start: 2019-08-21 | End: 2019-08-21

## 2019-08-21 RX ADMIN — SODIUM CHLORIDE 1000 ML: 900 INJECTION, SOLUTION INTRAVENOUS at 22:38

## 2019-08-21 RX ADMIN — SODIUM CHLORIDE 1000 ML: 900 INJECTION, SOLUTION INTRAVENOUS at 18:02

## 2019-08-21 RX ADMIN — METHYLPREDNISOLONE SODIUM SUCCINATE 125 MG: 125 INJECTION, POWDER, FOR SOLUTION INTRAMUSCULAR; INTRAVENOUS at 18:05

## 2019-08-21 RX ADMIN — NALOXONE HYDROCHLORIDE 0.4 MG: 1 INJECTION PARENTERAL at 22:37

## 2019-08-21 NOTE — ED NOTES
Assumed care of pt - significant other remains at bedside for comfort and care     Pt remains sleepy - easily aroused with voice yet fall quickly falls sleep - pt able to speak in full sentences with eye closed    (L) sided lip swelling noted at this time     Dorothea DICKINSON made aware of persistent mental state - no further orders at this time    Pt remains unable to provide a urine sample at this time - pt remains with pure wick in place     Dorothea DICKINSON reports no need to straight cath the pt at this time     Pt remains on 4 L NC - mild sleep apnea noted at this time;

## 2019-08-21 NOTE — ED TRIAGE NOTES
Pt arrives from home [facility] with c/o lip and leg swelling that began this morning. Reports taking keto supplements 2 days ago. Pt has a fentanyl patch she placed 2 days ago. Pt took a bid \"swig\" of bendadryl prior to arrival and is very lethargic. Room air O2 is 87-89. Placed on 4L O2 via NC, O2 now 93%. Audible wheezing noted.   Pt states she still takes Lisinopril

## 2019-08-21 NOTE — ED PROVIDER NOTES
EMERGENCY DEPARTMENT HISTORY AND PHYSICAL EXAM      Date: 8/21/2019  Patient Name: Terese Nunez  Patient Age and Sex: 51 y.o. female    History of Presenting Illness     Chief Complaint   Patient presents with    Lip Swelling    Ankle swelling    Wheezing       History Provided By: Patient    HPI: Terese Nunez, 51 y.o. female with history of type 1 diabetes complains of upper lip swelling today, unknown cause, also a couple days of bilateral ankle swelling. She thinks that her symptoms are secondary to taking a ketogenic diet supplement. She is also having acute on chronic wheezing and cough. States she stopped smoking a month ago. No fevers or sputum. No chest pain. Of note patient states she \"chugged\" liquid Benadryl prior to ED arrival to treat the swollen lip. Examining her purse it appears that she may have drank approximately 250 mg of Benadryl. She denies other drug use or SI. Location: Wheezing and upper lip swelling  Quality:    Mild  Severity: Mild  Duration: Hours  Timing:    Constant  Context: Ketogenic diet supplement  Modifying factors: Benadryl without relief  Associated symptoms: Wheezing, leg swelling    Pt denies any other alleviating or exacerbating factors. There are no other complaints, changes or physical findings at this time.      Past Medical History:   Diagnosis Date    Achilles tendon rupture     along with torn right patellar/femoral ligament    Arthritis     rt. foot    Chronic kidney disease     Chronic pain     abdominal pain    Diabetes (HCC)     IDDM on insulin pump and sensor    DM type 1 (diabetes mellitus, type 1) ([facility])     age 25    Endometriosis     s/p ex-lap 4x    Gastric ulcer     GERD (gastroesophageal reflux disease)     Herpes     Other and unspecified hyperlipidemia     Panic attacks     Psychiatric disorder     anxiety, panic attacks    PTSD (post-traumatic stress disorder)     Unspecified essential hypertension      Past Surgical History:   Procedure Laterality Date    HX COLONOSCOPY      HX GYN      2 d&c's    HX GYN      laparoscopies x5 for endometriosis    HX GYN      mirena in place    HX ORTHOPAEDIC  2002    achiles tendon repair,talus repair    HX ORTHOPAEDIC      injections x2 to right shoulder    HX ORTHOPAEDIC Left 02/07/2019    3rd trigger finger release       PCP: Arturo Anguiano DO    Past History   Past Medical History:  Past Medical History:   Diagnosis Date    Achilles tendon rupture     along with torn right patellar/femoral ligament    Arthritis     rt. foot    Chronic kidney disease     Chronic pain     abdominal pain    Diabetes (HCC)     IDDM on insulin pump and sensor    DM type 1 (diabetes mellitus, type 1) ([facility] [address]     age 25   25 \[facility]"" Endometriosis     s/p ex-lap 4x    Gastric ulcer     GERD (gastroesophageal reflux disease)     Herpes     Other and unspecified hyperlipidemia     Panic attacks     Psychiatric disorder     anxiety, panic attacks    PTSD (post-traumatic stress disorder)     Unspecified essential hypertension        Past Surgical History:  Past Surgical History:   Procedure Laterality Date    HX COLONOSCOPY      HX GYN      2 d&c's    HX GYN      laparoscopies x5 for endometriosis    HX GYN      mirena in place    HX ORTHOPAEDIC  2002    achiles tendon repair,talus repair    HX ORTHOPAEDIC      injections x2 to right shoulder    HX ORTHOPAEDIC Left 02/07/2019    3rd trigger finger release       Family History:  Family History   Problem Relation Age of Onset    Diabetes Mother         diet controlled    Diabetes Father         R foot amupation    Liver Disease Father     Heart Disease Paternal Uncle         MI in his 46s    Stroke Neg Hx        Social History:  Social History     Tobacco Use    Smoking status: Current Some Day Smoker     Packs/day: 0.75     Years: 17.00     Pack years: 12.75     Types: Cigarettes    Smokeless tobacco: Current User    Tobacco comment: Trying to quit   Substance Use Topics    Alcohol use: Yes     Comment: a beer every few months    Drug use: No     Types: OTC, Prescription       Allergies: Allergies   Allergen Reactions    Zocor [Simvastatin] Myalgia    Lisinopril Cough       Current Medications:  No current facility-administered medications on file prior to encounter. Current Outpatient Medications on File Prior to Encounter   Medication Sig Dispense Refill    umeclidinium-vilanterol (ANORO ELLIPTA) 62.5-25 mcg/actuation inhaler Take 1 Puff by inhalation daily. 1 Inhaler 0    umeclidinium-vilanterol (ANORO ELLIPTA) 62.5-25 mcg/actuation inhaler Take 1 Puff by inhalation daily. 1 Inhaler 11    insulin lispro (HUMALOG) 100 unit/mL injection sample 2 Vial 0    albuterol (PROVENTIL HFA, VENTOLIN HFA, PROAIR HFA) 90 mcg/actuation inhaler Take 1-2 Puffs by inhalation every four (4) hours as needed for Wheezing or Shortness of Breath. 1 Inhaler 0    insulin pump (PATIENT SUPPLIED) misc by SubCUTAneous route as needed.  furosemide (LASIX) 20 mg tablet Take 40 mg by mouth daily. Patient takes 60 mg in the morning and 40 mg in the afternoon.  HYDROcodone-acetaminophen (NORCO)  mg tablet Take 1 Tab by mouth.  carvedilol (COREG) 25 mg tablet Take 6.25 mg by mouth daily.  LORazepam (ATIVAN) 2 mg tablet Take 2 mg by mouth four (4) times daily.  losartan (COZAAR) 25 mg tablet Take 6.25 mg by mouth daily.  citalopram (CELEXA) 20 mg tablet Take 40 mg by mouth daily.  traZODone (DESYREL) 50 mg tablet Take 100 mg by mouth nightly.  insulin lispro (HUMALOG) 100 unit/mL injection Use as directed for insulin pump.   Max 100 units per day--BILL MEDICARE PART B--DX E10.65--REPLACES NOVOLOG 3 Vial 11    CONTOUR NEXT TEST STRIPS strip Use as directed to test up to 8 x per day,  Uncontrolled Type 1 diabetes with Neuropathy (HCC) E10.40, E10.65 250 Strip 11    Insulin Syringe-Needle U-100 0.5 mL 31 gauge x 5/16\" syrg Use as directed up to 4 times daily 100 Syringe 11    furosemide (LASIX) 20 mg tablet Take 60 mg by mouth daily. Patient takes 60 mg in the morning and 40 mg in the afternoon.  glucagon (GLUCAGON EMERGENCY KIT, HUMAN,) 1 mg injection USE A DIRECTED 2 Vial 11    esomeprazole (NEXIUM) 40 mg capsule TAKE ONE CAPSULE BY MOUTH DAILY 30 Cap 8    Insulin Needles, Disposable, (BD ULTRA-FINE MINI PEN NEEDLE) 31 gauge x 3/16\" ndle Use as directed up to 8 times daily 200 Pen Needle 11    gabapentin (NEURONTIN) 300 mg capsule TAKE TWO CAPSULES BY MOUTH THREE TIMES A  Cap 11    atorvastatin (LIPITOR) 40 mg tablet Take 1 Tab by mouth daily. 90 Tab 3    levonorgestrel (MIRENA) 20 mcg/24 hr (5 years) IUD 1 Device by IntraUTERine route once.  ARIPiprazole (ABILIFY) 30 mg tablet Take 30 mg by mouth daily.  fentaNYL (DURAGESIC) 100 mcg/hr PATCH 1 Patch by TransDERmal route every seventy-two (72) hours.  FREESTYLE MARIELA SENSOR kit Use 1 sensor every 10 days as directed 3 Each 11       Review of Systems   Review of Systems   Constitutional: Negative for fever. HENT: Positive for facial swelling. Respiratory: Positive for wheezing. Cardiovascular: Positive for leg swelling. All other systems reviewed and are negative. Physical Exam   Vital Signs  Patient Vitals for the past 24 hrs:   Temp Pulse Resp BP SpO2   08/21/19 2300  92 (!) 37 152/52 95 %   08/21/19 2230 98.4 °F (36.9 °C) 67 8 119/73 93 %   08/21/19 2000 98.4 °F (36.9 °C) 66 9 108/64 93 %   08/21/19 1945  66 12 114/64 94 %   08/21/19 1832     93 %   08/21/19 1815    93/49 95 %   08/21/19 1800    94/52 95 %   08/21/19 1648 98.5 °F (36.9 °C) 89 16 97/49 (!) 89 %       Physical Exam   Constitutional: She is oriented to person, place, and time. She appears well-developed and well-nourished. She appears lethargic. No distress.    Lethargic, very drowsy, arousable to voice with difficulty   HENT:   Head: Normocephalic and atraumatic. Mouth/Throat:       Eyes: Conjunctivae are normal. Right eye exhibits no discharge. Left eye exhibits no discharge. Neck: Normal range of motion. Neck supple. Cardiovascular: Normal rate, regular rhythm and normal heart sounds. No murmur heard. Pulmonary/Chest: Effort normal. No respiratory distress. She has wheezes ( Diffuse). Abdominal: Soft. She exhibits no distension. There is no tenderness. Musculoskeletal: Normal range of motion. She exhibits no deformity. Neurological: She is oriented to person, place, and time. She appears lethargic. Skin: Skin is warm. No rash noted. She is diaphoretic. Psychiatric: Her behavior is normal. Thought content normal. Her affect is blunt. Her speech is slurred. Cognition and memory are impaired. Blunted, slowed, slurred speech, seems intoxicated   Nursing note and vitals reviewed. Diagnostic Study Results   Labs  Recent Results (from the past 24 hour(s))   CBC WITH AUTOMATED DIFF    Collection Time: 08/21/19  5:38 PM   Result Value Ref Range    WBC 10.9 3.6 - 11.0 K/uL    RBC 4.08 3.80 - 5.20 M/uL    HGB 11.8 11.5 - 16.0 g/dL    HCT 35.5 35.0 - 47.0 %    MCV 87.0 80.0 - 99.0 FL    MCH 28.9 26.0 - 34.0 PG    MCHC 33.2 30.0 - 36.5 g/dL    RDW 15.9 (H) 11.5 - 14.5 %    PLATELET 994 780 - 456 K/uL    MPV 9.7 8.9 - 12.9 FL    NRBC 0.0 0  WBC    ABSOLUTE NRBC 0.00 0.00 - 0.01 K/uL    NEUTROPHILS 64 32 - 75 %    LYMPHOCYTES 26 12 - 49 %    MONOCYTES 9 5 - 13 %    EOSINOPHILS 1 0 - 7 %    BASOPHILS 0 0 - 1 %    IMMATURE GRANULOCYTES 0 0.0 - 0.5 %    ABS. NEUTROPHILS 7.0 1.8 - 8.0 K/UL    ABS. LYMPHOCYTES 2.8 0.8 - 3.5 K/UL    ABS. MONOCYTES 1.0 0.0 - 1.0 K/UL    ABS. EOSINOPHILS 0.1 0.0 - 0.4 K/UL    ABS. BASOPHILS 0.0 0.0 - 0.1 K/UL    ABS. IMM.  GRANS. 0.0 0.00 - 0.04 K/UL    DF AUTOMATED     METABOLIC PANEL, COMPREHENSIVE    Collection Time: 08/21/19  5:38 PM   Result Value Ref Range    Sodium 131 (L) 136 - 145 mmol/L    Potassium 3.9 3.5 - 5.1 mmol/L    Chloride 96 (L) 97 - 108 mmol/L    CO2 28 21 - 32 mmol/L    Anion gap 7 5 - 15 mmol/L    Glucose 144 (H) 65 - 100 mg/dL    BUN 53 (H) 6 - 20 MG/DL    Creatinine 2.34 (H) 0.55 - 1.02 MG/DL    BUN/Creatinine ratio 23 (H) 12 - 20      GFR est AA 27 (L) >60 ml/min/1.73m2    GFR est non-AA 22 (L) >60 ml/min/1.73m2    Calcium 9.2 8.5 - 10.1 MG/DL    Bilirubin, total 0.5 0.2 - 1.0 MG/DL    ALT (SGPT) 28 12 - 78 U/L    AST (SGOT) 58 (H) 15 - 37 U/L    Alk. phosphatase 103 45 - 117 U/L    Protein, total 7.4 6.4 - 8.2 g/dL    Albumin 4.0 3.5 - 5.0 g/dL    Globulin 3.4 2.0 - 4.0 g/dL    A-G Ratio 1.2 1.1 - 2.2     POC VENOUS BLOOD GAS    Collection Time: 08/21/19  5:38 PM   Result Value Ref Range    Device: NASAL CANNULA      Flow rate (POC) 4 L/M    pH, venous (POC) 7.349 7.32 - 7.42      pCO2, venous (POC) 55.9 (H) 41 - 51 MMHG    pO2, venous (POC) 56 (H) 25 - 40 mmHg    HCO3, venous (POC) 30.8 (H) 23.0 - 28.0 MMOL/L    sO2, venous (POC) 86 65 - 88 %    Base excess, venous (POC) 5 mmol/L    Allens test (POC) N/A      Total resp.  rate 20      Site OTHER      Specimen type (POC) VENOUS BLOOD     EKG, 12 LEAD, INITIAL    Collection Time: 08/21/19  6:03 PM   Result Value Ref Range    Ventricular Rate 72 BPM    Atrial Rate 72 BPM    P-R Interval 176 ms    QRS Duration 86 ms    Q-T Interval 418 ms    QTC Calculation (Bezet) 457 ms    Calculated P Axis 45 degrees    Calculated R Axis -3 degrees    Calculated T Axis 22 degrees    Diagnosis       Normal sinus rhythm  Normal ECG  When compared with ECG of 28-NOV-2017 19:04,  No significant change was found     GLUCOSE, POC    Collection Time: 08/21/19  6:07 PM   Result Value Ref Range    Glucose (POC) 157 (H) 65 - 100 mg/dL    Performed by Saranya Mancini    HCG URINE, QL    Collection Time: 08/21/19 10:33 PM   Result Value Ref Range    HCG urine, QL NEGATIVE  NEG     DRUG SCREEN, URINE    Collection Time: 08/21/19 10:33 PM   Result Value Ref Range    AMPHETAMINES NEGATIVE  NEG      BARBITURATES NEGATIVE  NEG      BENZODIAZEPINES NEGATIVE  NEG      COCAINE NEGATIVE  NEG      METHADONE NEGATIVE  NEG      OPIATES POSITIVE (A) NEG      PCP(PHENCYCLIDINE) NEGATIVE  NEG      THC (TH-CANNABINOL) NEGATIVE  NEG      Drug screen comment (NOTE)        Radiologic Studies  XR CHEST PA LAT   Final Result   IMPRESSION: No acute process           CT Results  (Last 48 hours)    None        CXR Results  (Last 48 hours)               08/21/19 1842  XR CHEST PA LAT Final result    Impression:  IMPRESSION: No acute process           Narrative:  INDICATION:  wheezing        COMPARISON: July 10       FINDINGS: PA and lateral views of the chest demonstrate a stable   cardiomediastinal silhouette and no focal airspace disease. There is chronic   prominence of the pulmonary interstitium. The visualized osseous structures are   unremarkable. Procedures     EKG  Date/Time: 8/21/2019 6:33 PM  Performed by: Mike Latham MD  Authorized by: Mike Latham MD     ECG reviewed by ED Physician in the absence of a cardiologist: yes    Interpretation:     Interpretation: non-specific    Rate:     ECG rate assessment: normal    Rhythm:     Rhythm: sinus rhythm    Ectopy:     Ectopy: none    QRS:     QRS axis:  Normal  ST segments:     ST segments:  Normal  T waves:     T waves: normal          Medical Decision Making     Provider Notes (Medical Decision Making):   45-year-old female with lethargy in the setting of taking large amount of Benadryl, also presenting diaphoretic, with slurred speech. Diffuse wheezing. The cause of her symptoms is unclear, does not appear to be acute significant allergic reaction. Her presentation is more consistent with intoxication with Benadryl and/or other substances. We will give site Medrol for wheezing, fluids, reevaluate. Patricia Brennan MD  6:29 PM    Patient became increasingly somnolent, difficult to arouse, apneic.   She was administered Narcan 0.4 mg with good effect, became much more alert appeared to have mild acute opioid withdrawal.  She is currently requiring 2 to 3 L of oxygen, requires further monitoring. Admit to medicine. Elijah Leung MD      Consult required? No      Medications Administered During ED Course:  Medications   methylPREDNISolone (PF) (Solu-MEDROL) injection 125 mg (125 mg IntraVENous Given 8/21/19 1805)   sodium chloride 0.9 % bolus infusion 1,000 mL (0 mL IntraVENous IV Completed 8/21/19 1949)   naloxone [facility]) injection 0.4 mg (0.4 mg IntraVENous Given 8/21/19 2237)   sodium chloride 0.9 % bolus infusion 1,000 mL (1,000 mL IntraVENous New Bag 8/21/19 2238)          Diagnosis     Disposition:      Clinical Impression:   1. DEBORAH (acute kidney injury) ([facility] [address]    2. Opiate overdose, accidental or unintentional, initial encounter [facility])        Attestation:  I personally performed the services described in this documentation on this date 8/21/2019 for patient Dinah Orozco. Melissa Wong MD        I was the first provider for this patient on this visit. To the best of my ability I reviewed relevant prior medical records, electrocardiograms, laboratories, and radiologic studies. The patient's presenting problems were discussed, and the patient was in agreement with the care plan formulated and outlined with them. Please note that this dictation was completed with Dragon voice recognition software. Quite often unanticipated grammatical, syntax, homophones, and other interpretive errors are inadvertently transcribed by the computer software. Please disregard these errors and excuse any errors that have escaped final proofreading.

## 2019-08-22 ENCOUNTER — APPOINTMENT (OUTPATIENT)
Dept: ULTRASOUND IMAGING | Age: 49
DRG: 917 | End: 2019-08-22
Attending: INTERNAL MEDICINE
Payer: MEDICARE

## 2019-08-22 PROBLEM — N17.9 AKI (ACUTE KIDNEY INJURY) (HCC): Status: ACTIVE | Noted: 2019-08-22

## 2019-08-22 PROBLEM — E66.01 SEVERE OBESITY (HCC): Chronic | Status: ACTIVE | Noted: 2019-01-25

## 2019-08-22 LAB
ANION GAP SERPL CALC-SCNC: 10 MMOL/L (ref 5–15)
APAP SERPL-MCNC: 2 UG/ML (ref 10–30)
APPEARANCE UR: ABNORMAL
ATRIAL RATE: 72 BPM
BACTERIA URNS QL MICRO: NEGATIVE /HPF
BASOPHILS # BLD: 0 K/UL (ref 0–0.1)
BASOPHILS NFR BLD: 0 % (ref 0–1)
BILIRUB UR QL: NEGATIVE
BUN SERPL-MCNC: 43 MG/DL (ref 6–20)
BUN/CREAT SERPL: 25 (ref 12–20)
CALCIUM SERPL-MCNC: 8.5 MG/DL (ref 8.5–10.1)
CALCULATED P AXIS, ECG09: 45 DEGREES
CALCULATED R AXIS, ECG10: -3 DEGREES
CALCULATED T AXIS, ECG11: 22 DEGREES
CHLORIDE SERPL-SCNC: 100 MMOL/L (ref 97–108)
CO2 SERPL-SCNC: 24 MMOL/L (ref 21–32)
COLOR UR: ABNORMAL
CREAT SERPL-MCNC: 1.71 MG/DL (ref 0.55–1.02)
DIAGNOSIS, 93000: NORMAL
DIFFERENTIAL METHOD BLD: ABNORMAL
EOSINOPHIL # BLD: 0 K/UL (ref 0–0.4)
EOSINOPHIL NFR BLD: 0 % (ref 0–7)
EPITH CASTS URNS QL MICRO: ABNORMAL /LPF
ERYTHROCYTE [DISTWIDTH] IN BLOOD BY AUTOMATED COUNT: 15.8 % (ref 11.5–14.5)
GLUCOSE BLD STRIP.AUTO-MCNC: 190 MG/DL (ref 65–100)
GLUCOSE BLD STRIP.AUTO-MCNC: 283 MG/DL (ref 65–100)
GLUCOSE BLD STRIP.AUTO-MCNC: 291 MG/DL (ref 65–100)
GLUCOSE BLD STRIP.AUTO-MCNC: 319 MG/DL (ref 65–100)
GLUCOSE SERPL-MCNC: 259 MG/DL (ref 65–100)
GLUCOSE UR STRIP.AUTO-MCNC: NEGATIVE MG/DL
HCT VFR BLD AUTO: 35.6 % (ref 35–47)
HGB BLD-MCNC: 11.9 G/DL (ref 11.5–16)
HGB UR QL STRIP: NEGATIVE
IMM GRANULOCYTES # BLD AUTO: 0 K/UL (ref 0–0.04)
IMM GRANULOCYTES NFR BLD AUTO: 1 % (ref 0–0.5)
KETONES UR QL STRIP.AUTO: NEGATIVE MG/DL
LEUKOCYTE ESTERASE UR QL STRIP.AUTO: NEGATIVE
LYMPHOCYTES # BLD: 1.2 K/UL (ref 0.8–3.5)
LYMPHOCYTES NFR BLD: 14 % (ref 12–49)
MCH RBC QN AUTO: 29.5 PG (ref 26–34)
MCHC RBC AUTO-ENTMCNC: 33.4 G/DL (ref 30–36.5)
MCV RBC AUTO: 88.3 FL (ref 80–99)
MONOCYTES # BLD: 0.1 K/UL (ref 0–1)
MONOCYTES NFR BLD: 1 % (ref 5–13)
NEUTS SEG # BLD: 7 K/UL (ref 1.8–8)
NEUTS SEG NFR BLD: 84 % (ref 32–75)
NITRITE UR QL STRIP.AUTO: NEGATIVE
NRBC # BLD: 0 K/UL (ref 0–0.01)
NRBC BLD-RTO: 0 PER 100 WBC
P-R INTERVAL, ECG05: 176 MS
PH UR STRIP: 5.5 [PH] (ref 5–8)
PLATELET # BLD AUTO: 276 K/UL (ref 150–400)
PMV BLD AUTO: 9.6 FL (ref 8.9–12.9)
POTASSIUM SERPL-SCNC: 4.6 MMOL/L (ref 3.5–5.1)
PROT UR STRIP-MCNC: NEGATIVE MG/DL
Q-T INTERVAL, ECG07: 418 MS
QRS DURATION, ECG06: 86 MS
QTC CALCULATION (BEZET), ECG08: 457 MS
RBC # BLD AUTO: 4.03 M/UL (ref 3.8–5.2)
RBC #/AREA URNS HPF: ABNORMAL /HPF (ref 0–5)
SERVICE CMNT-IMP: ABNORMAL
SODIUM SERPL-SCNC: 134 MMOL/L (ref 136–145)
SP GR UR REFRACTOMETRY: 1.01 (ref 1–1.03)
UROBILINOGEN UR QL STRIP.AUTO: 0.2 EU/DL (ref 0.2–1)
VENTRICULAR RATE, ECG03: 72 BPM
WBC # BLD AUTO: 8.3 K/UL (ref 3.6–11)
WBC URNS QL MICRO: ABNORMAL /HPF (ref 0–4)

## 2019-08-22 PROCEDURE — 85025 COMPLETE CBC W/AUTO DIFF WBC: CPT

## 2019-08-22 PROCEDURE — 80048 BASIC METABOLIC PNL TOTAL CA: CPT

## 2019-08-22 PROCEDURE — 65660000000 HC RM CCU STEPDOWN

## 2019-08-22 PROCEDURE — 74011250636 HC RX REV CODE- 250/636: Performed by: INTERNAL MEDICINE

## 2019-08-22 PROCEDURE — 74011000250 HC RX REV CODE- 250: Performed by: INTERNAL MEDICINE

## 2019-08-22 PROCEDURE — 94760 N-INVAS EAR/PLS OXIMETRY 1: CPT

## 2019-08-22 PROCEDURE — 81001 URINALYSIS AUTO W/SCOPE: CPT

## 2019-08-22 PROCEDURE — 94640 AIRWAY INHALATION TREATMENT: CPT

## 2019-08-22 PROCEDURE — 36415 COLL VENOUS BLD VENIPUNCTURE: CPT

## 2019-08-22 PROCEDURE — 76700 US EXAM ABDOM COMPLETE: CPT

## 2019-08-22 PROCEDURE — 80307 DRUG TEST PRSMV CHEM ANLYZR: CPT

## 2019-08-22 PROCEDURE — 74011636637 HC RX REV CODE- 636/637: Performed by: INTERNAL MEDICINE

## 2019-08-22 PROCEDURE — 82962 GLUCOSE BLOOD TEST: CPT

## 2019-08-22 PROCEDURE — 74011250637 HC RX REV CODE- 250/637: Performed by: INTERNAL MEDICINE

## 2019-08-22 PROCEDURE — 77010033678 HC OXYGEN DAILY

## 2019-08-22 RX ORDER — NALOXONE HYDROCHLORIDE 0.4 MG/ML
0.4 INJECTION, SOLUTION INTRAMUSCULAR; INTRAVENOUS; SUBCUTANEOUS AS NEEDED
Status: DISCONTINUED | OUTPATIENT
Start: 2019-08-22 | End: 2019-08-23 | Stop reason: HOSPADM

## 2019-08-22 RX ORDER — HYDROCODONE BITARTRATE AND ACETAMINOPHEN 10; 325 MG/1; MG/1
1 TABLET ORAL
Status: DISCONTINUED | OUTPATIENT
Start: 2019-08-22 | End: 2019-08-23 | Stop reason: HOSPADM

## 2019-08-22 RX ORDER — MAGNESIUM SULFATE 100 %
4 CRYSTALS MISCELLANEOUS AS NEEDED
Status: DISCONTINUED | OUTPATIENT
Start: 2019-08-22 | End: 2019-08-23

## 2019-08-22 RX ORDER — INSULIN LISPRO 100 [IU]/ML
INJECTION, SOLUTION INTRAVENOUS; SUBCUTANEOUS
Status: DISCONTINUED | OUTPATIENT
Start: 2019-08-22 | End: 2019-08-23

## 2019-08-22 RX ORDER — SODIUM CHLORIDE 0.9 % (FLUSH) 0.9 %
5-40 SYRINGE (ML) INJECTION AS NEEDED
Status: DISCONTINUED | OUTPATIENT
Start: 2019-08-22 | End: 2019-08-23 | Stop reason: HOSPADM

## 2019-08-22 RX ORDER — GABAPENTIN 300 MG/1
300 CAPSULE ORAL 3 TIMES DAILY
Status: DISCONTINUED | OUTPATIENT
Start: 2019-08-22 | End: 2019-08-23 | Stop reason: HOSPADM

## 2019-08-22 RX ORDER — INSULIN LISPRO 100 [IU]/ML
INJECTION, SOLUTION INTRAVENOUS; SUBCUTANEOUS
Status: DISCONTINUED | OUTPATIENT
Start: 2019-08-22 | End: 2019-08-22

## 2019-08-22 RX ORDER — ACETAMINOPHEN 325 MG/1
650 TABLET ORAL
Status: DISCONTINUED | OUTPATIENT
Start: 2019-08-22 | End: 2019-08-23 | Stop reason: HOSPADM

## 2019-08-22 RX ORDER — INSULIN GLARGINE 100 [IU]/ML
30 INJECTION, SOLUTION SUBCUTANEOUS DAILY
Status: DISCONTINUED | OUTPATIENT
Start: 2019-08-22 | End: 2019-08-22

## 2019-08-22 RX ORDER — PANTOPRAZOLE SODIUM 40 MG/1
40 TABLET, DELAYED RELEASE ORAL DAILY
Status: DISCONTINUED | OUTPATIENT
Start: 2019-08-22 | End: 2019-08-23 | Stop reason: HOSPADM

## 2019-08-22 RX ORDER — ONDANSETRON 2 MG/ML
4 INJECTION INTRAMUSCULAR; INTRAVENOUS
Status: DISCONTINUED | OUTPATIENT
Start: 2019-08-22 | End: 2019-08-23 | Stop reason: HOSPADM

## 2019-08-22 RX ORDER — BISACODYL 5 MG
5 TABLET, DELAYED RELEASE (ENTERIC COATED) ORAL DAILY PRN
Status: DISCONTINUED | OUTPATIENT
Start: 2019-08-22 | End: 2019-08-23 | Stop reason: HOSPADM

## 2019-08-22 RX ORDER — SODIUM CHLORIDE 9 MG/ML
100 INJECTION, SOLUTION INTRAVENOUS CONTINUOUS
Status: DISCONTINUED | OUTPATIENT
Start: 2019-08-22 | End: 2019-08-23 | Stop reason: HOSPADM

## 2019-08-22 RX ORDER — ATORVASTATIN CALCIUM 40 MG/1
40 TABLET, FILM COATED ORAL DAILY
Status: DISCONTINUED | OUTPATIENT
Start: 2019-08-22 | End: 2019-08-23 | Stop reason: HOSPADM

## 2019-08-22 RX ORDER — ARIPIPRAZOLE 5 MG/1
30 TABLET ORAL DAILY
Status: DISCONTINUED | OUTPATIENT
Start: 2019-08-22 | End: 2019-08-23 | Stop reason: HOSPADM

## 2019-08-22 RX ORDER — INSULIN LISPRO 100 [IU]/ML
10 INJECTION, SOLUTION INTRAVENOUS; SUBCUTANEOUS
Status: DISCONTINUED | OUTPATIENT
Start: 2019-08-22 | End: 2019-08-23

## 2019-08-22 RX ORDER — INSULIN GLARGINE 100 [IU]/ML
40 INJECTION, SOLUTION SUBCUTANEOUS DAILY
Status: DISCONTINUED | OUTPATIENT
Start: 2019-08-23 | End: 2019-08-23

## 2019-08-22 RX ORDER — HEPARIN SODIUM 5000 [USP'U]/ML
5000 INJECTION, SOLUTION INTRAVENOUS; SUBCUTANEOUS EVERY 8 HOURS
Status: DISCONTINUED | OUTPATIENT
Start: 2019-08-22 | End: 2019-08-23 | Stop reason: HOSPADM

## 2019-08-22 RX ORDER — SODIUM CHLORIDE 0.9 % (FLUSH) 0.9 %
5-40 SYRINGE (ML) INJECTION EVERY 8 HOURS
Status: DISCONTINUED | OUTPATIENT
Start: 2019-08-22 | End: 2019-08-23 | Stop reason: HOSPADM

## 2019-08-22 RX ORDER — CITALOPRAM 20 MG/1
40 TABLET, FILM COATED ORAL DAILY
Status: DISCONTINUED | OUTPATIENT
Start: 2019-08-22 | End: 2019-08-23 | Stop reason: HOSPADM

## 2019-08-22 RX ORDER — LORAZEPAM 1 MG/1
2 TABLET ORAL 4 TIMES DAILY
Status: DISCONTINUED | OUTPATIENT
Start: 2019-08-22 | End: 2019-08-23 | Stop reason: HOSPADM

## 2019-08-22 RX ORDER — IPRATROPIUM BROMIDE AND ALBUTEROL SULFATE 2.5; .5 MG/3ML; MG/3ML
3 SOLUTION RESPIRATORY (INHALATION)
Status: DISCONTINUED | OUTPATIENT
Start: 2019-08-22 | End: 2019-08-23

## 2019-08-22 RX ORDER — CARVEDILOL 6.25 MG/1
6.25 TABLET ORAL DAILY
Status: DISCONTINUED | OUTPATIENT
Start: 2019-08-22 | End: 2019-08-23 | Stop reason: HOSPADM

## 2019-08-22 RX ORDER — DEXTROSE MONOHYDRATE 100 MG/ML
125-250 INJECTION, SOLUTION INTRAVENOUS AS NEEDED
Status: DISCONTINUED | OUTPATIENT
Start: 2019-08-22 | End: 2019-08-23 | Stop reason: HOSPADM

## 2019-08-22 RX ORDER — INSULIN GLARGINE 100 [IU]/ML
10 INJECTION, SOLUTION SUBCUTANEOUS ONCE
Status: COMPLETED | OUTPATIENT
Start: 2019-08-22 | End: 2019-08-22

## 2019-08-22 RX ADMIN — LORAZEPAM 2 MG: 1 TABLET ORAL at 11:42

## 2019-08-22 RX ADMIN — SODIUM CHLORIDE 100 ML/HR: 900 INJECTION, SOLUTION INTRAVENOUS at 16:20

## 2019-08-22 RX ADMIN — HUMAN INSULIN 40 UNITS: 100 INJECTION, SUSPENSION SUBCUTANEOUS at 21:45

## 2019-08-22 RX ADMIN — Medication 10 ML: at 13:43

## 2019-08-22 RX ADMIN — GABAPENTIN 300 MG: 300 CAPSULE ORAL at 21:43

## 2019-08-22 RX ADMIN — Medication 10 ML: at 21:37

## 2019-08-22 RX ADMIN — HEPARIN SODIUM 5000 UNITS: 5000 INJECTION INTRAVENOUS; SUBCUTANEOUS at 09:14

## 2019-08-22 RX ADMIN — IPRATROPIUM BROMIDE AND ALBUTEROL SULFATE 3 ML: .5; 3 SOLUTION RESPIRATORY (INHALATION) at 19:15

## 2019-08-22 RX ADMIN — HYDROCODONE BITARTRATE AND ACETAMINOPHEN 1 TABLET: 10; 325 TABLET ORAL at 21:43

## 2019-08-22 RX ADMIN — HYDROCODONE BITARTRATE AND ACETAMINOPHEN 1 TABLET: 10; 325 TABLET ORAL at 16:10

## 2019-08-22 RX ADMIN — HEPARIN SODIUM 5000 UNITS: 5000 INJECTION INTRAVENOUS; SUBCUTANEOUS at 02:55

## 2019-08-22 RX ADMIN — IPRATROPIUM BROMIDE AND ALBUTEROL SULFATE 3 ML: .5; 3 SOLUTION RESPIRATORY (INHALATION) at 07:50

## 2019-08-22 RX ADMIN — INSULIN LISPRO 2 UNITS: 100 INJECTION, SOLUTION INTRAVENOUS; SUBCUTANEOUS at 21:46

## 2019-08-22 RX ADMIN — INSULIN LISPRO 6 UNITS: 100 INJECTION, SOLUTION INTRAVENOUS; SUBCUTANEOUS at 16:39

## 2019-08-22 RX ADMIN — SODIUM CHLORIDE 100 ML/HR: 900 INJECTION, SOLUTION INTRAVENOUS at 04:11

## 2019-08-22 RX ADMIN — INSULIN GLARGINE 10 UNITS: 100 INJECTION, SOLUTION SUBCUTANEOUS at 18:24

## 2019-08-22 RX ADMIN — PANTOPRAZOLE SODIUM 40 MG: 40 TABLET, DELAYED RELEASE ORAL at 12:00

## 2019-08-22 RX ADMIN — Medication 10 ML: at 04:12

## 2019-08-22 RX ADMIN — HEPARIN SODIUM 5000 UNITS: 5000 INJECTION INTRAVENOUS; SUBCUTANEOUS at 16:10

## 2019-08-22 RX ADMIN — INSULIN LISPRO 8 UNITS: 100 INJECTION, SOLUTION INTRAVENOUS; SUBCUTANEOUS at 11:41

## 2019-08-22 RX ADMIN — INSULIN LISPRO 6 UNITS: 100 INJECTION, SOLUTION INTRAVENOUS; SUBCUTANEOUS at 08:06

## 2019-08-22 RX ADMIN — ATORVASTATIN CALCIUM 40 MG: 40 TABLET, FILM COATED ORAL at 10:52

## 2019-08-22 RX ADMIN — METHYLPREDNISOLONE SODIUM SUCCINATE 40 MG: 40 INJECTION, POWDER, FOR SOLUTION INTRAMUSCULAR; INTRAVENOUS at 13:42

## 2019-08-22 RX ADMIN — GABAPENTIN 300 MG: 300 CAPSULE ORAL at 16:10

## 2019-08-22 RX ADMIN — LORAZEPAM 2 MG: 1 TABLET ORAL at 21:43

## 2019-08-22 RX ADMIN — IPRATROPIUM BROMIDE AND ALBUTEROL SULFATE 3 ML: .5; 3 SOLUTION RESPIRATORY (INHALATION) at 15:36

## 2019-08-22 RX ADMIN — INSULIN GLARGINE 30 UNITS: 100 INJECTION, SOLUTION SUBCUTANEOUS at 09:16

## 2019-08-22 RX ADMIN — HYDROCODONE BITARTRATE AND ACETAMINOPHEN 1 TABLET: 10; 325 TABLET ORAL at 11:42

## 2019-08-22 RX ADMIN — ARIPIPRAZOLE 30 MG: 5 TABLET ORAL at 11:45

## 2019-08-22 RX ADMIN — HUMAN INSULIN 40 UNITS: 100 INJECTION, SUSPENSION SUBCUTANEOUS at 13:42

## 2019-08-22 RX ADMIN — CITALOPRAM HYDROBROMIDE 40 MG: 20 TABLET ORAL at 11:42

## 2019-08-22 RX ADMIN — HUMAN INSULIN 40 UNITS: 100 INJECTION, SUSPENSION SUBCUTANEOUS at 09:15

## 2019-08-22 RX ADMIN — METHYLPREDNISOLONE SODIUM SUCCINATE 40 MG: 40 INJECTION, POWDER, FOR SOLUTION INTRAMUSCULAR; INTRAVENOUS at 06:20

## 2019-08-22 RX ADMIN — IPRATROPIUM BROMIDE AND ALBUTEROL SULFATE 3 ML: .5; 3 SOLUTION RESPIRATORY (INHALATION) at 12:28

## 2019-08-22 RX ADMIN — CARVEDILOL 6.25 MG: 6.25 TABLET, FILM COATED ORAL at 10:52

## 2019-08-22 RX ADMIN — INSULIN LISPRO 10 UNITS: 100 INJECTION, SOLUTION INTRAVENOUS; SUBCUTANEOUS at 18:24

## 2019-08-22 RX ADMIN — IPRATROPIUM BROMIDE AND ALBUTEROL SULFATE 3 ML: .5; 3 SOLUTION RESPIRATORY (INHALATION) at 05:41

## 2019-08-22 RX ADMIN — LORAZEPAM 2 MG: 1 TABLET ORAL at 17:35

## 2019-08-22 RX ADMIN — METHYLPREDNISOLONE SODIUM SUCCINATE 40 MG: 40 INJECTION, POWDER, FOR SOLUTION INTRAMUSCULAR; INTRAVENOUS at 21:35

## 2019-08-22 NOTE — ROUTINE PROCESS
* No surgery found *  * No surgery found *  Bedside and Verbal shift change report given to [address] Geovanna Bianchi (oncoming nurse) by [employer] (offgoing nurse). Report included the following information SBAR, Kardex, Intake/Output and MAR. Zone Phone:   [phone]      Significant changes during shift:  Admitted to unit, pt went down for abdominal ultrasound.     Patient Information    Terese Nunez  51 y.o.  8/21/2019  4:34 PM by Jacqueline Phelps MD. Terese Nunez was admitted from Home    Problem List    Patient Active Problem List    Diagnosis Date Noted    Drug overdose 08/21/2019    Change in mental status 08/21/2019    COPD exacerbation ([address] 07/10/2019    Severe obesity ([address] 01/25/2019    Diabetic nephropathy associated with type 1 diabetes mellitus ([address] 05/11/2018    Essential hypertension 12/15/2017    Aspiration pneumonia ([address] 11/26/2017    DKA (diabetic ketoacidoses) ([address] 11/20/2017    Anxiety and depression 11/14/2017    Positive blood culture 11/07/2012    Tobacco abuse 11/07/2012    Elevated LFTs 09/06/2012    Abnormal weight gain 12/22/2011    Endometriosis 03/22/2011    Hyperlipidemia LDL goal <100     Uncontrolled type I diabetes mellitus with neuropathy (HCC)      Past Medical History:   Diagnosis Date    Achilles tendon rupture     along with torn right patellar/femoral ligament    Arthritis     rt. foot    Chronic kidney disease     Chronic pain     abdominal pain    Diabetes (HCC)     IDDM on insulin pump and sensor    DM type 1 (diabetes mellitus, type 1) ([address]     age 25    Endometriosis     s/p ex-lap 4x    Gastric ulcer     GERD (gastroesophageal reflux disease)     Herpes     Other and unspecified hyperlipidemia     Panic attacks     Psychiatric disorder     anxiety, panic attacks    PTSD (post-traumatic stress disorder)     Unspecified essential hypertension          Core Measures:    CVA: No No  CHF:No No  PNA:No No        Activity Status:    OOB to Chair Yes  Ambulated this shift No   Bed Rest No    Supplemental O2: (If Applicable)    On room air        LINES AND DRAINS:    PIV    DVT prophylaxis:    DVT prophylaxis Med- Yes  DVT prophylaxis SCD or KRIS- No     Wounds: (If Applicable)    Wounds- No    Location     Patient Safety:    Falls Score Total Score: 3  Safety Level_______  Bed Alarm On? No  Sitter?  No    Plan for upcoming shift: Nephrogy consult          Discharge Plan: No TBD    Active Consults:  IP CONSULT TO HOSPITALIST  IP CONSULT TO NEPHROLOGY

## 2019-08-22 NOTE — PROGRESS NOTES
Endocrinology Progress Note    Received a notification through [facility] that this clinic patient of mine was admitted for drug overdose and reviewed her chart and saw that she was started on IV solumedrol. She is a type 1 diabetic and has no basal insulin ordered nor any NPH ordered to cover the steroids. As a result, she will likely go into DKA if these are not ordered as her sugar this morning was already 291. I will kindly ask that Dr. Solange Alcantar place an order for an inpatient endocrine consult and I will be happy to manage her diabetes while she is admitted. I have already ordered lantus 30 units for basal insulin and started NPH 40 units every 8 hours to match the solumedrol 40 mg IV every 8 hour dose and made her correctional humalog scale more aggressive. I will be by to see her later today. I spoke with her nurse, Stacey Wolf, about this plan. Please don't hesitate to call [phone] with further questions.     Yvette Freitas MD

## 2019-08-22 NOTE — PROGRESS NOTES
ADULT PROTOCOL: JET AEROSOL  REASSESSMENT    Patient  Andrei Gray     51 y.o.   female     8/22/2019  11:14 AM    Breath Sounds Pre Procedure: Right Breath Sounds: Diminished                               Left Breath Sounds: Diminished    Breath Sounds Post Procedure:                                        Breathing pattern: Pre procedure Breathing Pattern: Regular          Post procedure Breathing Pattern: Regular    Heart Rate: Pre procedure Pulse: 75           Post procedure Pulse: 81    Resp Rate: Pre procedure Respirations: 20           Post procedure Respirations: 17    Oxygen: O2 Device: Nasal cannula   4     Changed: NO    SpO2: Pre procedure SpO2: 98 %   with oxygen              Post procedure SpO2: 95 %  with oxygen    Nebulizer Therapy: Current medications Aerosolized Medications: DuoNeb      Changed: NO    Smoking History: Current Some Day Smoker    Problem List:   Patient Active Problem List   Diagnosis Code    Hyperlipidemia LDL goal <100 E78.5    Uncontrolled type I diabetes mellitus with neuropathy ([facility]) E10.40, E10.65    Endometriosis N80.9    Abnormal weight gain R63.5    Elevated LFTs R94.5    Positive blood culture R78.81    Tobacco abuse Z72.0    Anxiety and depression F41.9, F32.9    DKA (diabetic ketoacidoses) ([facility]) E13.10    Aspiration pneumonia ([facility]) J69.0    Essential hypertension I10    Diabetic nephropathy associated with type 1 diabetes mellitus ([facility]) E10.21    Severe obesity ([facility]) E66.01    COPD exacerbation ([facility]) J44.1    Drug overdose T50.901A    Change in mental status R41.82       Respiratory Therapist: Arsalan Lynn RT

## 2019-08-22 NOTE — PROGRESS NOTES
Problem: Falls - Risk of  Goal: *Absence of Falls  Description  Document Margrett Bernheim Fall Risk and appropriate interventions in the flowsheet.   Note:   Fall Risk Interventions:  Mobility Interventions: Bed/chair exit alarm, Patient to call before getting OOB         Medication Interventions: Bed/chair exit alarm, Patient to call before getting OOB, Teach patient to arise slowly    Elimination Interventions: Bed/chair exit alarm, Call light in reach, Toileting schedule/hourly rounds

## 2019-08-22 NOTE — H&P
Hospitalist Admission Note    NAME: Andrew Arroyo   :  1970   MRN:  [id number]     Date/Time:  2019 23:47 PM    Patient PCP: Shahram Landry, DO  ______________________________________________________________________  Given the patient's current clinical presentation, I have a high level of concern for decompensation if discharged from the emergency department. Complex decision making was performed, which includes reviewing the patient's available past medical records, laboratory results, and x-ray films. My assessment of this patient's clinical condition and my plan of care is as follows. Assessment / Plan:    Change mental status most likely secondary to metabolic encephalopathy secondary to acute renal failure superimposed drug overdose Benadryl and possible opioid  Admit patient to telemetry  Nephrology consultation  Follow-up urine analysis  Renal ultrasound  start patient on IV fluid    Upper lip is swelling and bilateral ankle swelling rule out allergic reaction  Start patient on IV Solu-Medrol  Monitoring closely    DM  Start patient sliding scale of insulin    Hypertension  Continue home antihypertensive medication    GERD  Continue PPI    Psych  disorders  Hold psych medication because of change mental    Hyperlipidemia  Continue Lipitor 40 mg daily    COPD exacerbation   -start Solumedrol   -Duoneb nebulizer       Code Status: Full   Surrogate Decision Maker:    DVT Prophylaxis: Heparin   GI Prophylaxis: not indicated    Baseline:  Independent       Subjective:   CHIEF COMPLAINT: SOB     HISTORY OF PRESENT ILLNESS:     51years old female from home with past medical history significant for DM, hypertension, hyperlipidemia, Crohn's disease, chronic pain presented to the hospital for evaluation of upper lip swelling associated with bilateral ankle swelling started today associated with change mental status, patient stated she took Benadryl because of lip swelling initially she thought she has some allergic reaction, blood work in ED show worsening kidney function creatinine 2.34 BUN 53, drug screen was done it was positive for opioid, patient mental status improved after Narcan was given. We were asked to admit for work up and evaluation of the above problems.      Past Medical History:   Diagnosis Date    Achilles tendon rupture     along with torn right patellar/femoral ligament    Arthritis     rt. foot    Chronic kidney disease     Chronic pain     abdominal pain    Diabetes (HCC)     IDDM on insulin pump and sensor    DM type 1 (diabetes mellitus, type 1) ([facility] [address]     age 25    Endometriosis     s/p ex-lap 4x    Gastric ulcer     GERD (gastroesophageal reflux disease)     Herpes     Other and unspecified hyperlipidemia     Panic attacks     Psychiatric disorder     anxiety, panic attacks    PTSD (post-traumatic stress disorder)     Unspecified essential hypertension         Past Surgical History:   Procedure Laterality Date    HX COLONOSCOPY      HX GYN      2 d&c's    HX GYN      laparoscopies x5 for endometriosis    HX GYN      mirena in place    HX ORTHOPAEDIC  2002    achiles tendon repair,talus repair    HX ORTHOPAEDIC      injections x2 to right shoulder    HX ORTHOPAEDIC Left 02/07/2019    3rd trigger finger release       Social History     Tobacco Use    Smoking status: Current Some Day Smoker     Packs/day: 0.75     Years: 17.00     Pack years: 12.75     Types: Cigarettes    Smokeless tobacco: Current User    Tobacco comment: Trying to quit   Substance Use Topics    Alcohol use: Yes     Comment: a beer every few months        Family History   Problem Relation Age of Onset    Diabetes Mother         diet controlled    Diabetes Father         R foot amupation    Liver Disease Father     Heart Disease Paternal Uncle         MI in his 46s    Stroke Neg Hx      Allergies   Allergen Reactions    Zocor [Simvastatin] Myalgia    Lisinopril Cough        Prior to Admission medications    Medication Sig Start Date End Date Taking? Authorizing Provider   umeclidinium-vilanterol (ANORO ELLIPTA) 62.5-25 mcg/actuation inhaler Take 1 Puff by inhalation daily. 7/18/19   Lino Gonsalez III, DO   umeclidinium-vilanterol (ANORO ELLIPTA) 62.5-25 mcg/actuation inhaler Take 1 Puff by inhalation daily. 7/18/19   Arielle CARMEN III, DO   insulin lispro (HUMALOG) 100 unit/mL injection sample 7/12/19   Pedro Luis Faustin MD   albuterol (PROVENTIL HFA, VENTOLIN HFA, PROAIR HFA) 90 mcg/actuation inhaler Take 1-2 Puffs by inhalation every four (4) hours as needed for Wheezing or Shortness of Breath. 7/12/19   Cooper Randall MD   insulin pump (PATIENT SUPPLIED) misc by SubCUTAneous route as needed. Provider, Historical   furosemide (LASIX) 20 mg tablet Take 40 mg by mouth daily. Patient takes 60 mg in the morning and 40 mg in the afternoon. Provider, Historical   HYDROcodone-acetaminophen (NORCO)  mg tablet Take 1 Tab by mouth. Provider, Historical   carvedilol (COREG) 25 mg tablet Take 6.25 mg by mouth daily. Provider, Historical   LORazepam (ATIVAN) 2 mg tablet Take 2 mg by mouth four (4) times daily. Provider, Historical   losartan (COZAAR) 25 mg tablet Take 6.25 mg by mouth daily. 5/22/19   Provider, Historical   citalopram (CELEXA) 20 mg tablet Take 40 mg by mouth daily. 5/20/19   Provider, Historical   traZODone (DESYREL) 50 mg tablet Take 100 mg by mouth nightly. 5/6/19   Provider, Historical   insulin lispro (HUMALOG) 100 unit/mL injection Use as directed for insulin pump.   Max 100 units per day--BILL MEDICARE PART B--DX E10.65--REPLACES NOVOLOG 4/22/19   Pedro Luis Faustin MD   CONTOUR NEXT TEST STRIPS strip Use as directed to test up to 8 x per day,  Uncontrolled Type 1 diabetes with Neuropathy ([facility]) E10.40, E10.65 3/20/19   Pedro Luis Faustin MD   Insulin Syringe-Needle U-100 0.5 mL 31 gauge x 5/16\" syrg Use as directed up to 4 times daily 3/20/19   Cm Vega MD   furosemide (LASIX) 20 mg tablet Take 60 mg by mouth daily. Patient takes 60 mg in the morning and 40 mg in the afternoon. 2/27/19   Cm Vega MD   glucagon (GLUCAGON EMERGENCY KIT, HUMAN,) 1 mg injection USE A DIRECTED 2/25/19   Cm Vega MD   esomeprazole (NEXIUM) 40 mg capsule TAKE ONE CAPSULE BY MOUTH DAILY 12/2/18   [employer] III, DO   Insulin Needles, Disposable, (BD ULTRA-FINE MINI PEN NEEDLE) 31 gauge x 3/16\" ndle Use as directed up to 8 times daily 10/25/18   Cm Vega MD   gabapentin (NEURONTIN) 300 mg capsule TAKE TWO CAPSULES BY MOUTH THREE TIMES A DAY 10/8/18   Cm Vega MD   atorvastatin (LIPITOR) 40 mg tablet Take 1 Tab by mouth daily. 9/26/18   [employer] III, DO   levonorgestrel (MIRENA) 20 mcg/24 hr (5 years) IUD 1 Device by IntraUTERine route once. Provider, Historical   ARIPiprazole (ABILIFY) 30 mg tablet Take 30 mg by mouth daily. 7/18/18   Provider, Historical   fentaNYL (DURAGESIC) 100 mcg/hr PATCH 1 Patch by TransDERmal route every seventy-two (72) hours. 7/23/18   Provider, Historical   FREESTYLE MARIELA SENSOR kit Use 1 sensor every 10 days as directed 7/24/18   Cm Vega MD       REVIEW OF SYSTEMS:     I am not able to complete the review of systems because:    The patient is intubated and sedated    The patient has altered mental status due to his acute medical problems    The patient has baseline aphasia from prior stroke(s)    The patient has baseline dementia and is not reliable historian    The patient is in acute medical distress and unable to provide information           Total of 12 systems reviewed as follows:       POSITIVE= underlined text  Negative = text not underlined  General:  fever, chills, sweats, generalized weakness, weight loss/gain,      loss of appetite   Eyes:    blurred vision, eye pain, loss of vision, double vision  ENT:    rhinorrhea, pharyngitis   Respiratory:   cough, sputum production, SOB, HERNÁNDEZ, wheezing, pleuritic pain   Cardiology:   chest pain, palpitations, orthopnea, PND, edema, syncope   Gastrointestinal:  abdominal pain , N/V, diarrhea, dysphagia, constipation, bleeding   Genitourinary:  frequency, urgency, dysuria, hematuria, incontinence   Muskuloskeletal :  arthralgia, myalgia, back pain  Hematology:  easy bruising, nose or gum bleeding, lymphadenopathy   Dermatological: rash, ulceration, pruritis, color change / jaundice  Endocrine:   hot flashes or polydipsia   Neurological:  headache, dizziness, confusion, focal weakness, paresthesia,     Speech difficulties, memory loss, gait difficulty  Psychological: Feelings of anxiety, depression, agitation    Objective:   VITALS:    Visit Vitals  /64   Pulse 81   Temp 98.7 °F (37.1 °C)   Resp 18   Ht 5' 5\" (1.651 m)   Wt 98.4 kg (217 lb)   SpO2 96%   BMI 36.11 kg/m²       PHYSICAL EXAM:    General:    Alert, cooperative, no distress, appears stated age. HEENT: Atraumatic, anicteric sclerae, pink conjunctivae     No oral ulcers, mucosa moist, throat clear, dentition fair ,upper lip swelling   Neck:  Supple, symmetrical,  thyroid: non tender  Lungs:   Clear to auscultation bilaterally. No Wheezing or Rhonchi. No rales. Chest wall:  No tenderness  No Accessory muscle use. Heart:   Regular  rhythm,  No  murmur   B/l  edema  Abdomen:   Soft, non-tender. Not distended. Bowel sounds normal  Extremities: No cyanosis. No clubbing,      Skin turgor normal, Capillary refill normal, Radial dial pulse 2+  Skin:     Not pale. Not Jaundiced  No rashes   Psych:  Good insight. Not depressed. Not anxious or agitated. Neurologic: EOMs intact. No facial asymmetry. No aphasia or slurred speech. Symmetrical strength, Sensation grossly intact.  Alert and oriented X 2-3    _______________________________________________________________________  Care Plan discussed with:    Comments   Patient y    Family      RN y    Care Manager                    Consultant:      _______________________________________________________________________  Expected  Disposition:   Home with Family y   HH/PT/OT/RN    SNF/LTC    CHIDI    ________________________________________________________________________  TOTAL TIME:  61  Minutes    Critical Care Provided     Minutes non procedure based      Comments    y Reviewed previous records   >50% of visit spent in counseling and coordination of care y Discussion with patient and/or family and questions answered       ________________________________________________________________________  Signed: Cole Vann MD    Procedures: see electronic medical records for all procedures/Xrays and details which were not copied into this note but were reviewed prior to creation of Plan. LAB DATA REVIEWED:    Recent Results (from the past 24 hour(s))   CBC WITH AUTOMATED DIFF    Collection Time: 08/21/19  5:38 PM   Result Value Ref Range    WBC 10.9 3.6 - 11.0 K/uL    RBC 4.08 3.80 - 5.20 M/uL    HGB 11.8 11.5 - 16.0 g/dL    HCT 35.5 35.0 - 47.0 %    MCV 87.0 80.0 - 99.0 FL    MCH 28.9 26.0 - 34.0 PG    MCHC 33.2 30.0 - 36.5 g/dL    RDW 15.9 (H) 11.5 - 14.5 %    PLATELET 981 635 - 594 K/uL    MPV 9.7 8.9 - 12.9 FL    NRBC 0.0 0  WBC    ABSOLUTE NRBC 0.00 0.00 - 0.01 K/uL    NEUTROPHILS 64 32 - 75 %    LYMPHOCYTES 26 12 - 49 %    MONOCYTES 9 5 - 13 %    EOSINOPHILS 1 0 - 7 %    BASOPHILS 0 0 - 1 %    IMMATURE GRANULOCYTES 0 0.0 - 0.5 %    ABS. NEUTROPHILS 7.0 1.8 - 8.0 K/UL    ABS. LYMPHOCYTES 2.8 0.8 - 3.5 K/UL    ABS. MONOCYTES 1.0 0.0 - 1.0 K/UL    ABS. EOSINOPHILS 0.1 0.0 - 0.4 K/UL    ABS. BASOPHILS 0.0 0.0 - 0.1 K/UL    ABS. IMM.  GRANS. 0.0 0.00 - 0.04 K/UL    DF AUTOMATED     METABOLIC PANEL, COMPREHENSIVE    Collection Time: 08/21/19  5:38 PM   Result Value Ref Range    Sodium 131 (L) 136 - 145 mmol/L    Potassium 3.9 3.5 - 5.1 mmol/L    Chloride 96 (L) 97 - 108 mmol/L    CO2 28 21 - 32 mmol/L    Anion gap 7 5 - 15 mmol/L    Glucose 144 (H) 65 - 100 mg/dL    BUN 53 (H) 6 - 20 MG/DL    Creatinine 2.34 (H) 0.55 - 1.02 MG/DL    BUN/Creatinine ratio 23 (H) 12 - 20      GFR est AA 27 (L) >60 ml/min/1.73m2    GFR est non-AA 22 (L) >60 ml/min/1.73m2    Calcium 9.2 8.5 - 10.1 MG/DL    Bilirubin, total 0.5 0.2 - 1.0 MG/DL    ALT (SGPT) 28 12 - 78 U/L    AST (SGOT) 58 (H) 15 - 37 U/L    Alk. phosphatase 103 45 - 117 U/L    Protein, total 7.4 6.4 - 8.2 g/dL    Albumin 4.0 3.5 - 5.0 g/dL    Globulin 3.4 2.0 - 4.0 g/dL    A-G Ratio 1.2 1.1 - 2.2     POC VENOUS BLOOD GAS    Collection Time: 08/21/19  5:38 PM   Result Value Ref Range    Device: NASAL CANNULA      Flow rate (POC) 4 L/M    pH, venous (POC) 7.349 7.32 - 7.42      pCO2, venous (POC) 55.9 (H) 41 - 51 MMHG    pO2, venous (POC) 56 (H) 25 - 40 mmHg    HCO3, venous (POC) 30.8 (H) 23.0 - 28.0 MMOL/L    sO2, venous (POC) 86 65 - 88 %    Base excess, venous (POC) 5 mmol/L    Allens test (POC) N/A      Total resp.  rate 20      Site OTHER      Specimen type (POC) VENOUS BLOOD     EKG, 12 LEAD, INITIAL    Collection Time: 08/21/19  6:03 PM   Result Value Ref Range    Ventricular Rate 72 BPM    Atrial Rate 72 BPM    P-R Interval 176 ms    QRS Duration 86 ms    Q-T Interval 418 ms    QTC Calculation (Bezet) 457 ms    Calculated P Axis 45 degrees    Calculated R Axis -3 degrees    Calculated T Axis 22 degrees    Diagnosis       Normal sinus rhythm  Normal ECG  When compared with ECG of 28-NOV-2017 19:04,  No significant change was found     GLUCOSE, POC    Collection Time: 08/21/19  6:07 PM   Result Value Ref Range    Glucose (POC) 157 (H) 65 - 100 mg/dL    Performed by Naima Gonsalez    HCG URINE, QL    Collection Time: 08/21/19 10:33 PM   Result Value Ref Range    HCG urine, QL NEGATIVE  NEG     DRUG SCREEN, URINE    Collection Time: 08/21/19 10:33 PM   Result Value Ref Range    AMPHETAMINES NEGATIVE  NEG BARBITURATES NEGATIVE  NEG      BENZODIAZEPINES NEGATIVE  NEG      COCAINE NEGATIVE  NEG      METHADONE NEGATIVE  NEG      OPIATES POSITIVE (A) NEG      PCP(PHENCYCLIDINE) NEGATIVE  NEG      THC (TH-CANNABINOL) NEGATIVE  NEG      Drug screen comment (NOTE)    URINALYSIS W/MICROSCOPIC    Collection Time: 08/22/19 12:29 AM   Result Value Ref Range    Color YELLOW/STRAW      Appearance TURBID (A) CLEAR      Specific gravity 1.014 1.003 - 1.030      pH (UA) 5.5 5.0 - 8.0      Protein NEGATIVE  NEG mg/dL    Glucose NEGATIVE  NEG mg/dL    Ketone NEGATIVE  NEG mg/dL    Bilirubin NEGATIVE  NEG      Blood NEGATIVE  NEG      Urobilinogen 0.2 0.2 - 1.0 EU/dL    Nitrites NEGATIVE  NEG      Leukocyte Esterase NEGATIVE  NEG      WBC 0-4 0 - 4 /hpf    RBC 0-5 0 - 5 /hpf    Epithelial cells FEW FEW /lpf    Bacteria NEGATIVE  NEG /hpf   ACETAMINOPHEN    Collection Time: 08/22/19 12:29 AM   Result Value Ref Range    Acetaminophen level 2 (L) 10 - 30 ug/mL   METABOLIC PANEL, BASIC    Collection Time: 08/22/19  3:04 AM   Result Value Ref Range    Sodium 134 (L) 136 - 145 mmol/L    Potassium 4.6 3.5 - 5.1 mmol/L    Chloride 100 97 - 108 mmol/L    CO2 24 21 - 32 mmol/L    Anion gap 10 5 - 15 mmol/L    Glucose 259 (H) 65 - 100 mg/dL    BUN 43 (H) 6 - 20 MG/DL    Creatinine 1.71 (H) 0.55 - 1.02 MG/DL    BUN/Creatinine ratio 25 (H) 12 - 20      GFR est AA 39 (L) >60 ml/min/1.73m2    GFR est non-AA 32 (L) >60 ml/min/1.73m2    Calcium 8.5 8.5 - 10.1 MG/DL   CBC WITH AUTOMATED DIFF    Collection Time: 08/22/19  3:04 AM   Result Value Ref Range    WBC 8.3 3.6 - 11.0 K/uL    RBC 4.03 3.80 - 5.20 M/uL    HGB 11.9 11.5 - 16.0 g/dL    HCT 35.6 35.0 - 47.0 %    MCV 88.3 80.0 - 99.0 FL    MCH 29.5 26.0 - 34.0 PG    MCHC 33.4 30.0 - 36.5 g/dL    RDW 15.8 (H) 11.5 - 14.5 %    PLATELET 138 589 - 249 K/uL    MPV 9.6 8.9 - 12.9 FL    NRBC 0.0 0  WBC    ABSOLUTE NRBC 0.00 0.00 - 0.01 K/uL    NEUTROPHILS 84 (H) 32 - 75 %    LYMPHOCYTES 14 12 - 49 % MONOCYTES 1 (L) 5 - 13 %    EOSINOPHILS 0 0 - 7 %    BASOPHILS 0 0 - 1 %    IMMATURE GRANULOCYTES 1 (H) 0.0 - 0.5 %    ABS. NEUTROPHILS 7.0 1.8 - 8.0 K/UL    ABS. LYMPHOCYTES 1.2 0.8 - 3.5 K/UL    ABS. MONOCYTES 0.1 0.0 - 1.0 K/UL    ABS. EOSINOPHILS 0.0 0.0 - 0.4 K/UL    ABS. BASOPHILS 0.0 0.0 - 0.1 K/UL    ABS. IMM.  GRANS. 0.0 0.00 - 0.04 K/UL    DF AUTOMATED

## 2019-08-22 NOTE — PROGRESS NOTES
Problem: Falls - Risk of  Goal: *Absence of Falls  Description  Document Margrett Bernheim Fall Risk and appropriate interventions in the flowsheet. 8/22/2019 0512 by Antonella Nash RN  Outcome: Progressing Towards Goal  Note:   Fall Risk Interventions:  Mobility Interventions: Patient to call before getting OOB         Medication Interventions: Teach patient to arise slowly, Patient to call before getting OOB    Elimination Interventions: Call light in reach           8/22/2019 0511 by Antonella Nash RN  Outcome: Progressing Towards Goal  Note:   Fall Risk Interventions:  Mobility Interventions: Patient to call before getting OOB         Medication Interventions: Teach patient to arise slowly, Patient to call before getting OOB    Elimination Interventions: Call light in reach              Problem: Patient Education: Go to Patient Education Activity  Goal: Patient/Family Education  Outcome: Progressing Towards Goal     Problem: Tobacco Use  Goal: *Tobacco use abstinence  8/22/2019 0512 by Antonella Nash RN  Outcome: Progressing Towards Goal  8/22/2019 0511 by Antonella Nash RN  Outcome: Progressing Towards Goal  Goal: *Knowledge of tobacco use cessation methods  8/22/2019 0512 by Antonella Nash RN  Outcome: Progressing Towards Goal  8/22/2019 0511 by Antonella Nash RN  Outcome: Progressing Towards Goal     Problem: General Medical Care Plan  Goal: *Vital signs within specified parameters  8/22/2019 0512 by Antonella Nash RN  Outcome: Progressing Towards Goal  8/22/2019 0511 by Antonella Nash RN  Outcome: Progressing Towards Goal  Goal: *Labs within defined limits  Outcome: Progressing Towards Goal  Goal: *Optimal pain control at patient's stated goal  Outcome: Progressing Towards Goal     Problem: Diabetes Self-Management  Goal: *Disease process and treatment process  Description  Define diabetes and identify own type of diabetes; list 3 options for treating diabetes.   Outcome: Progressing Towards Goal  Goal: *Incorporating nutritional management into lifestyle  Description  Describe effect of type, amount and timing of food on blood glucose; list 3 methods for planning meals.   Outcome: Progressing Towards Goal     Problem: Patient Education: Go to Patient Education Activity  Goal: Patient/Family Education  Outcome: Progressing Towards Goal

## 2019-08-22 NOTE — PROGRESS NOTES
* No surgery found *  * No surgery found *  Bedside and Verbal shift change report given to Cielo ASHTON  (oncoming nurse) by Trent Sepulveda R.N (offgoing nurse).  Report included the following information SBAR, Kardex, Intake/Output and MAR.     Zone Phone:   [phone]        Significant changes during shift:  NONE     Patient Information     Orlin Dalton  51 y.o.  8/21/2019  4:34 PM by Mateo Villalta MD. Orlin Dalton was admitted from Home     Problem List          Patient Active Problem List     Diagnosis Date Noted    Drug overdose 08/21/2019    Change in mental status 08/21/2019    COPD exacerbation ([address] 07/10/2019    Severe obesity ([address] 01/25/2019    Diabetic nephropathy associated with type 1 diabetes mellitus ([address] 05/11/2018    Essential hypertension 12/15/2017    Aspiration pneumonia ([address] 11/26/2017    DKA (diabetic ketoacidoses) ([address] 11/20/2017    Anxiety and depression 11/14/2017    Positive blood culture 11/07/2012    Tobacco abuse 11/07/2012    Elevated LFTs 09/06/2012    Abnormal weight gain 12/22/2011    Endometriosis 03/22/2011    Hyperlipidemia LDL goal <100      Uncontrolled type I diabetes mellitus with neuropathy ([address]             Past Medical History:   Diagnosis Date    Achilles tendon rupture       along with torn right patellar/femoral ligament    Arthritis       rt. foot    Chronic kidney disease      Chronic pain       abdominal pain    Diabetes ([address]       IDDM on insulin pump and sensor    DM type 1 (diabetes mellitus, type 1) ([address]       age 25   24 [facility] Endometriosis       s/p ex-lap 4x    Gastric ulcer      GERD (gastroesophageal reflux disease)      Herpes      Other and unspecified hyperlipidemia      Panic attacks      Psychiatric disorder       anxiety, panic attacks    PTSD (post-traumatic stress disorder)      Unspecified essential hypertension              Core Measures:     CVA: No No  CHF:No No  PNA:No No           Activity Status:     OOB to Chair Yes  Ambulated this shift YES  Bed Rest No     Supplemental O2: (If Applicable)     On room air          LINES AND DRAINS:     PIV     DVT prophylaxis:     DVT prophylaxis Med- Yes  DVT prophylaxis SCD or KRIS- No      Wounds: (If Applicable)     Wounds- No     Location      Patient Safety:     Falls Score Total Score: 3  Safety Level_______  Bed Alarm On? No  Sitter?  No     Plan for upcoming shift: pain assessment              Discharge Plan: No TBD     Active Consults:  IP CONSULT TO HOSPITALIST  IP CONSULT TO NEPHROLOGY

## 2019-08-22 NOTE — CONSULTS
Consultation Note    NAME: Orlin Risk   :  1970   MRN:  [id number]     Date/Time:  2019 7:24 PM    I have been asked to see this patient by Dr. Pavan Van  for advice/opinion re: ANISH. Assessment :    Plan:  ANISH on CKD stage 3  Albuminuria   Mild hyponatremia  Drug overdose (opioid and benadryl)  DM1  AMS Baseline creatinine ~ 1.3, now 2.3 to 1.7; bland urine; Nml kidneys on US    Hypotension on admission    I think prerenal azotemia on underlying DM nephropathy           Subjective:   CHIEF COMPLAINT:  anish    HISTORY OF PRESENT ILLNESS:     [facility] is a 51 y.o.   female who has a history of the below. Admitted with AMS (she had taken a weight loss supplement for a couple of days prior to admission). She then developed swelling and took \"half\" a bottle of benadryl. Her father is on HD from DM. She denies nsaids. Decreased appetite and intake this week. No decreased urination or dysuria. No hematuria. No foamy urine. No DM retinopathy. + DM neuropathy.     Past Medical History:   Diagnosis Date    Achilles tendon rupture     along with torn right patellar/femoral ligament    Arthritis     rt. foot    Chronic kidney disease     Chronic pain     abdominal pain    Diabetes (HCC)     IDDM on insulin pump and sensor    DM type 1 (diabetes mellitus, type 1) ([facility] [place] [address]     age 25    Endometriosis     s/p ex-lap 4x    Gastric ulcer     GERD (gastroesophageal reflux disease)     Herpes     Other and unspecified hyperlipidemia     Panic attacks     Psychiatric disorder     anxiety, panic attacks    PTSD (post-traumatic stress disorder)     Unspecified essential hypertension       Past Surgical History:   Procedure Laterality Date    HX COLONOSCOPY      HX GYN      2 d&c's    HX GYN      laparoscopies x5 for endometriosis    HX GYN      mirena in place    HX ORTHOPAEDIC      achiles tendon repair,talus repair    HX ORTHOPAEDIC      injections x2 to right shoulder    HX ORTHOPAEDIC Left 02/07/2019    3rd trigger finger release     Social History     Tobacco Use    Smoking status: Current Some Day Smoker     Packs/day: 0.75     Years: 17.00     Pack years: 12.75     Types: Cigarettes    Smokeless tobacco: Current User    Tobacco comment: Trying to quit   Substance Use Topics    Alcohol use: Yes     Comment: a beer every few months      Family History   Problem Relation Age of Onset    Diabetes Mother         diet controlled    Diabetes Father         R foot amupation    Liver Disease Father     Heart Disease Paternal Uncle         MI in his 46s    Stroke Neg Hx       Allergies   Allergen Reactions    Zocor [Simvastatin] Myalgia    Lisinopril Cough      Prior to Admission medications    Medication Sig Start Date End Date Taking? Authorizing Provider   umeclidinium-vilanterol (ANORO ELLIPTA) 62.5-25 mcg/actuation inhaler Take 1 Puff by inhalation daily. 7/18/19   Lino Gonsalez III, DO   umeclidinium-vilanterol (ANORO ELLIPTA) 62.5-25 mcg/actuation inhaler Take 1 Puff by inhalation daily. 7/18/19   Saniya CARMEN III, DO   insulin lispro (HUMALOG) 100 unit/mL injection sample 7/12/19   Bianka Bellday, MD   albuterol (PROVENTIL HFA, VENTOLIN HFA, PROAIR HFA) 90 mcg/actuation inhaler Take 1-2 Puffs by inhalation every four (4) hours as needed for Wheezing or Shortness of Breath. 7/12/19   Charo Alonso MD   insulin pump (PATIENT SUPPLIED) misc by SubCUTAneous route as needed. Provider, Historical   furosemide (LASIX) 20 mg tablet Take 40 mg by mouth daily. Patient takes 60 mg in the morning and 40 mg in the afternoon. Provider, Historical   HYDROcodone-acetaminophen (NORCO)  mg tablet Take 1 Tab by mouth. Provider, Historical   carvedilol (COREG) 25 mg tablet Take 6.25 mg by mouth daily. Provider, Historical   LORazepam (ATIVAN) 2 mg tablet Take 2 mg by mouth four (4) times daily.     Provider, Historical   losartan (COZAAR) 25 mg tablet Take 6.25 mg by mouth daily. 5/22/19   Provider, Historical   citalopram (CELEXA) 20 mg tablet Take 40 mg by mouth daily. 5/20/19   Provider, Historical   traZODone (DESYREL) 50 mg tablet Take 100 mg by mouth nightly. 5/6/19   Provider, Historical   insulin lispro (HUMALOG) 100 unit/mL injection Use as directed for insulin pump. Max 100 units per day--BILL MEDICARE PART B--DX E10.65--REPLACES NOVOLOG 4/22/19   Glo Lowe MD   CONTOUR NEXT TEST STRIPS strip Use as directed to test up to 8 x per day,  Uncontrolled Type 1 diabetes with Neuropathy ([facility] [address] E10.40, E10.65 3/20/19   Glo Lowe MD   Insulin Syringe-Needle U-100 0.5 mL 31 gauge x 5/16\" syrg Use as directed up to 4 times daily 3/20/19   Glo Lowe MD   furosemide (LASIX) 20 mg tablet Take 60 mg by mouth daily. Patient takes 60 mg in the morning and 40 mg in the afternoon. 2/27/19   Glo Lowe MD   glucagon (GLUCAGON EMERGENCY KIT, HUMAN,) 1 mg injection USE A DIRECTED 2/25/19   Glo Lowe MD   esomeprazole (NEXIUM) 40 mg capsule TAKE ONE CAPSULE BY MOUTH DAILY 12/2/18   Francine CARMEN III, DO   Insulin Needles, Disposable, (BD ULTRA-FINE MINI PEN NEEDLE) 31 gauge x 3/16\" ndle Use as directed up to 8 times daily 10/25/18   Glo Lowe MD   gabapentin (NEURONTIN) 300 mg capsule TAKE TWO CAPSULES BY MOUTH THREE TIMES A DAY 10/8/18   Glo Lowe MD   atorvastatin (LIPITOR) 40 mg tablet Take 1 Tab by mouth daily. 9/26/18   Francine Huynh III, DO   levonorgestrel (MIRENA) 20 mcg/24 hr (5 years) IUD 1 Device by IntraUTERine route once. Provider, Historical   ARIPiprazole (ABILIFY) 30 mg tablet Take 30 mg by mouth daily. 7/18/18   Provider, Historical   fentaNYL (DURAGESIC) 100 mcg/hr PATCH 1 Patch by TransDERmal route every seventy-two (72) hours.  7/23/18   Provider, Historical   FREESTYLE MARIELA SENSOR kit Use 1 sensor every 10 days as directed 7/24/18   Glo Lowe MD     REVIEW OF SYSTEMS: []  Unable to obtain reliable ROS due to  [] mental status  [] sedated   [] intubated   [x] Total of 12 systems reviewed as follows:  Constitutional: negative fever, negative chills, negative weight loss  Eyes:   negative visual changes  ENT:   negative sore throat, tongue or lip swelling  Respiratory:  negative cough, negative dyspnea  Cards:  negative for chest pain, palpitations,+ lower extremity edema  GI:   negative for nausea, vomiting, diarrhea, and abdominal pain  :  negative for frequency, dysuria  Integument:  negative for rash and pruritus  Heme:  negative for easy bruising and gum/nose bleeding  Musculoskel: negative for myalgias,  back pain and muscle weakness  Neuro:  negative for headaches, dizziness, vertigo  Psych:  negative for feelings of anxiety, depression   Travel?: none    Objective:   VITALS:    Visit Vitals  /72 (BP 1 Location: Left arm, BP Patient Position: Sitting)   Pulse 68   Temp 98.2 °F (36.8 °C)   Resp 20   Ht 5' 5\" (1.651 m)   Wt 98.4 kg (217 lb)   SpO2 94%   Breastfeeding?  No   BMI 36.11 kg/m²     PHYSICAL EXAM:  Gen:  []  WD []  WN  [] cachectic []  thin [x]  obese []  disheveled             []  ill apearing  []   Critical  []   Chronic    [x]  No acute distress    HEENT:   [x] NC/AT/PERRLA/EOMI    [] pink conjunctivae      [] pale conjunctivae                  PERRL  [] yes  [] no      [] moist mucosa    [] dry mucosa    hearing intact to voice [x] yes  [] No                 NECK:   supple [x] yes  [] no        masses [] yes  [] No               thyroid  []  non tender  []  tender    RESP:   [x] CTA bilaterally/no wheezing/rhonchi/rales/crackles    [] rhonchi bilaterally - no dullness  [] wheezing   [] rhonchi   [] crackles     use of accessory muscles [] yes [] no    CARD:   [x]  regular rate and rhythm/No murmurs/rubs/gallops    murmur  [] yes ()  [] no      Rubs  [] yes  [] no       Gallops [] yes  [] no    Rate []  regular  []  irregular        carotid bruits  [] Right []  Left                 LE edema [x] yes  [] no           JVP  []  yes   []  no    ABD:    [x] soft/non distended/non tender/+bowel sounds/no HSM    []  Rigid    tenderness [] yes [] no   Liver enlargement  []  yes []  no                Spleen enlargement  []  yes []  no     distended []  yes [] no     bowel sound  [] hypoactive   [] hyperactive    LYMPH:    Neck []  yes [x]  no       Axillae []  yes []  no    SKIN:   Rashes []  yes   [x]  no    Ulcers []  yes   []  no               [] tight to palpitation    skin turgor []  good  [] poor  [] decreased               Cyanosis/clubbing []  yes []  no    NEUR:   [x] cranial nerves II-XII grossly intact       [] Cranial nerves deficit                 []  facial droop    []  slurred speech   [] aphasic     [] Strength normal     []  weakness  []  LUE  []   RUE/ []  LLE  []   RLE    follows commands  [x]  yes []  no           PSYCH:   insight [] poor [x] good   Alert and Oriented to  [x] person  [x] place  [x]  time                    [] depressed [] anxious [] agitated  [] lethargic [] stuporous  [] sedated     LAB DATA REVIEWED:    Recent Labs     08/22/19  0304 08/21/19  1738   WBC 8.3 10.9   HGB 11.9 11.8   HCT 35.6 35.5    301     Recent Labs     08/22/19  0304 08/21/19  1738   * 131*   K 4.6 3.9    96*   CO2 24 28   BUN 43* 53*   CREA 1.71* 2.34*   * 144*   CA 8.5 9.2     Recent Labs     08/21/19  1738   SGOT 58*   ALT 28      TBILI 0.5   ALB 4.0   GLOB 3.4     No results for input(s): INR, PTP, APTT in the last 72 hours. No lab exists for component: INREXT   No results for input(s): FE, TIBC, PSAT, FERR in the last 72 hours. No results for input(s): PH, PCO2, PO2 in the last 72 hours. No results for input(s): CPK, CKMB in the last 72 hours.     No lab exists for component: TROPONINI  Lab Results   Component Value Date/Time    Glucose (POC) 283 (H) 08/22/2019 04:08 PM    Glucose (POC) 319 (H) 08/22/2019 11:34 AM    Glucose (POC) 291 (H) 08/22/2019 06:19 AM    Glucose (POC) 157 (H) 08/21/2019 06:07 PM    Glucose (POC) 330 (H) 07/12/2019 07:14 AM       Procedures: see electronic medical records for all procedures/Xrays and details which were not copied into this note but were reviewed prior to creation of Plan.    ________________________________________________________________________       ___________________________________________________  Consulting Physician:  Willard Gold MD

## 2019-08-22 NOTE — PROGRESS NOTES
ADULT PROTOCOL: JET AEROSOL ASSESSMENT    Patient  Tiffany Landin     51 y.o.   female     8/22/2019  5:56 AM    Breath Sounds Pre Procedure:  Right breath sounds: Clear; diminshed                                Left breath sounds: Clear; diminshed    Breath Sounds Post Procedure:  Right breath sounds: Clear; diminshed                                  Left breath sounds: Clear; diminished    Breathing pattern: Pre procedure Breathing Pattern: Regular          Post procedure Breathing Pattern: Regular    Heart Rate: Pre procedure Pulse: 80           Post procedure Pulse: 81    Resp Rate: Pre procedure Respirations: 17           Post procedure Respirations: 17      Oxygen: O2 Device: Nasal cannula   4LPM     Changed: NO    SpO2: Pre procedure SpO2: 96 %  WITH oxygen              Post procedure SpO2: 95 % WITH oxygen    Nebulizer Therapy: Current medications Aerosolized Medications: DuoNeb      Changed: NO      Problem List:   Patient Active Problem List   Diagnosis Code    Hyperlipidemia LDL goal <100 E78.5    Uncontrolled type I diabetes mellitus with neuropathy ([facility]) E10.40, E10.65    Endometriosis N80.9    Abnormal weight gain R63.5    Elevated LFTs R94.5    Positive blood culture R78.81    Tobacco abuse Z72.0    Anxiety and depression F41.9, F32.9    DKA (diabetic ketoacidoses) ([facility]) E13.10    Aspiration pneumonia ([facility]) J69.0    Essential hypertension I10    Diabetic nephropathy associated with type 1 diabetes mellitus ([facility]) E10.21    Severe obesity ([facility]) E66.01    COPD exacerbation ([facility]) J44.1    Drug overdose T50.901A    Change in mental status R41.82       Respiratory Therapist: Radha Rothman

## 2019-08-22 NOTE — PROGRESS NOTES
Hospitalist Progress Note    NAME: Fco Schroeder   :  1970   MRN:  [id number]       Assessment / Plan:  Acute Metabolic Encephalopathy POA - resolved today AM  Likely due to Benadryl overuse/overdose + Chronic Pain & Psych meds + DEBORAH POA- Cr down to 1.7 (2.3 POA)  Renal USG= negative for hydro    Cont IVF  BMP in AM  Nephrology consulted in ER  Resume some home Psych & pain meds today    Angioedema POA- ?allergic reaction to OTC Weight loss medication as per pt  Cont IV Solu-Medrol for now- taper quickly with response as pt is Type 1 Dm- will cause hyperglycemia  Monitoring closely    DM type 1 on insulin pump at home  IP endocrine consulted Dr Roselia Sloan for help manage BS on steroids    Hypertension  Continue home antihypertensive medication    GERD  Continue PPI    Psych  disorders  Resume some psych medications now as pt close to baseline MS now    Hyperlipidemia  Continue Lipitor 40 mg daily     COPD exacerbation - mild  Cont on Solumedrol for now  -Duoneb nebulizer         Code Status: Full   Surrogate Decision Maker:     DVT Prophylaxis: Heparin   GI Prophylaxis: not indicated     Baseline: Independent       30.0 - 39.9 Obese / Body mass index is 36.11 kg/m². Weight loss recommended    Recommended Disposition: Home w/Family in 24 - 48 hrs when stable     Subjective:     Chief Complaint / Reason for Physician Visit:F/U AMS/drowsiness, Drug (benadryl) overdose, DEBORAH  \"I am much better, want my Ativan & lortab\". Discussed with RN events overnight. Review of Systems:  Symptom Y/N Comments  Symptom Y/N Comments   Fever/Chills n   Chest Pain n    Poor Appetite n   Edema n    Cough n   Abdominal Pain n    Sputum n   Joint Pain n    SOB/HERNÁNDEZ n   Pruritis/Rash     Nausea/vomit    Tolerating PT/OT y    Diarrhea n   Tolerating Diet y    Constipation    Other       Could NOT obtain due to:      Objective:     VITALS:   Last 24hrs VS reviewed since prior progress note.  Most recent are:  Patient Vitals for the past 24 hrs:   Temp Pulse Resp BP SpO2   08/22/19 1537     93 %   08/22/19 1229     95 %   08/22/19 1132 98.1 °F (36.7 °C) 80 18 158/83 93 %   08/22/19 0811 97.9 °F (36.6 °C) 80 18 137/68 91 %   08/22/19 0750     98 %   08/22/19 0543     96 %   08/22/19 0413 98.5 °F (36.9 °C) 78 20 120/68 95 %   08/22/19 0245 98.7 °F (37.1 °C) 81 18 121/64 96 %   08/22/19 0200  78 11  93 %   08/21/19 2330 98.4 °F (36.9 °C) 97 24 123/82 93 %   08/21/19 2300  92 (!) 37 152/52 95 %   08/21/19 2230 98.4 °F (36.9 °C) 67 8 119/73 93 %   08/21/19 2000 98.4 °F (36.9 °C) 66 9 108/64 93 %   08/21/19 1945  66 12 114/64 94 %   08/21/19 1832     93 %   08/21/19 1815    93/49 95 %   08/21/19 1800    94/52 95 %   08/21/19 1648 98.5 °F (36.9 °C) 89 16 97/49 (!) 89 %       Intake/Output Summary (Last 24 hours) at 8/22/2019 1540  Last data filed at 8/22/2019 0020  Gross per 24 hour   Intake 2000 ml   Output 550 ml   Net 1450 ml        PHYSICAL EXAM:  General: WD, WN. Alert, cooperative, no acute distress, Morbidly obese +    EENT:  EOMI. Anicteric sclerae. MMM, Mild Lip swelling  +  Resp:  CTA bilaterally, no wheezing or rales. No accessory muscle use  CV:  Regular  rhythm,  B/L LE tense edema noted +  GI:  Soft, Non distended, Non tender.  +Bowel sounds  Neurologic:  Alert and oriented X 3, normal speech,   Psych:   Good insight. Not anxious nor agitated  Skin:  No rashes.   No jaundice    Reviewed most current lab test results and cultures  YES  Reviewed most current radiology test results   YES  Review and summation of old records today    NO  Reviewed patient's current orders and MAR    YES  PMH/SH reviewed - no change compared to H&P  ________________________________________________________________________  Care Plan discussed with:    Comments   Patient x    Family      RN x    Care Manager x    Consultant                       x Multidiciplinary team rounds were held today with , nursing, pharmacist and clinical coordinator. Patient's plan of care was discussed; medications were reviewed and discharge planning was addressed. ________________________________________________________________________  Total NON critical care TIME:  36   Minutes    Total CRITICAL CARE TIME Spent:   Minutes non procedure based      Comments   >50% of visit spent in counseling and coordination of care     ________________________________________________________________________  Blank Billingsley MD     Procedures: see electronic medical records for all procedures/Xrays and details which were not copied into this note but were reviewed prior to creation of Plan. LABS:  I reviewed today's most current labs and imaging studies.   Pertinent labs include:  Recent Labs     08/22/19  0304 08/21/19  1738   WBC 8.3 10.9   HGB 11.9 11.8   HCT 35.6 35.5    301     Recent Labs     08/22/19  0304 08/21/19  1738   * 131*   K 4.6 3.9    96*   CO2 24 28   * 144*   BUN 43* 53*   CREA 1.71* 2.34*   CA 8.5 9.2   ALB  --  4.0   TBILI  --  0.5   SGOT  --  58*   ALT  --  28       Signed: Blank Billingsley MD

## 2019-08-22 NOTE — PROGRESS NOTES
Nephrology consult was called earlier today by Davi Huddleston ( the  ), pt  has not been seen yet. Follow up call was done to office but only answering machine.

## 2019-08-22 NOTE — ED NOTES
TRANSFER - OUT REPORT:    Verbal report given to [facility] RN (name) on Kendra Phillips  being transferred to Neuro tele (unit) for routine progression of care       Report consisted of patients Situation, Background, Assessment and   Recommendations(SBAR). Information from the following report(s) SBAR, Kardex, ED Summary, Intake/Output and MAR was reviewed with the receiving nurse. Lines:   Peripheral IV 08/21/19 Right Hand (Active)   Site Assessment Clean, dry, & intact 8/21/2019  6:31 PM   Phlebitis Assessment 0 8/21/2019  6:31 PM   Infiltration Assessment 0 8/21/2019  6:31 PM   Dressing Status Clean, dry, & intact 8/21/2019  6:31 PM        Opportunity for questions and clarification was provided.       Patient transported with:   Monitor

## 2019-08-22 NOTE — ED NOTES
Pt presenting with general tremors and shaking - applied warm blankets     Remains A/Ox4 - remains with 4 L NC at this time    Harleen Ricks MD made aware

## 2019-08-23 ENCOUNTER — TELEPHONE (OUTPATIENT)
Dept: INTERNAL MEDICINE CLINIC | Age: 49
End: 2019-08-23

## 2019-08-23 VITALS
WEIGHT: 217 LBS | HEIGHT: 65 IN | RESPIRATION RATE: 18 BRPM | SYSTOLIC BLOOD PRESSURE: 149 MMHG | BODY MASS INDEX: 36.15 KG/M2 | TEMPERATURE: 98.2 F | OXYGEN SATURATION: 95 % | HEART RATE: 59 BPM | DIASTOLIC BLOOD PRESSURE: 72 MMHG

## 2019-08-23 LAB
ANION GAP SERPL CALC-SCNC: 6 MMOL/L (ref 5–15)
BASOPHILS # BLD: 0 K/UL (ref 0–0.1)
BASOPHILS NFR BLD: 0 % (ref 0–1)
BUN SERPL-MCNC: 29 MG/DL (ref 6–20)
BUN/CREAT SERPL: 23 (ref 12–20)
CALCIUM SERPL-MCNC: 8.2 MG/DL (ref 8.5–10.1)
CHLORIDE SERPL-SCNC: 100 MMOL/L (ref 97–108)
CK SERPL-CCNC: 1604 U/L (ref 26–192)
CO2 SERPL-SCNC: 25 MMOL/L (ref 21–32)
CREAT SERPL-MCNC: 1.25 MG/DL (ref 0.55–1.02)
DIFFERENTIAL METHOD BLD: ABNORMAL
EOSINOPHIL # BLD: 0 K/UL (ref 0–0.4)
EOSINOPHIL NFR BLD: 0 % (ref 0–7)
ERYTHROCYTE [DISTWIDTH] IN BLOOD BY AUTOMATED COUNT: 15.9 % (ref 11.5–14.5)
GLUCOSE BLD STRIP.AUTO-MCNC: 270 MG/DL (ref 65–100)
GLUCOSE BLD STRIP.AUTO-MCNC: 322 MG/DL (ref 65–100)
GLUCOSE SERPL-MCNC: 302 MG/DL (ref 65–100)
HCT VFR BLD AUTO: 33.3 % (ref 35–47)
HGB BLD-MCNC: 11 G/DL (ref 11.5–16)
IMM GRANULOCYTES # BLD AUTO: 0 K/UL (ref 0–0.04)
IMM GRANULOCYTES NFR BLD AUTO: 0 % (ref 0–0.5)
LYMPHOCYTES # BLD: 1.1 K/UL (ref 0.8–3.5)
LYMPHOCYTES NFR BLD: 11 % (ref 12–49)
MCH RBC QN AUTO: 29.4 PG (ref 26–34)
MCHC RBC AUTO-ENTMCNC: 33 G/DL (ref 30–36.5)
MCV RBC AUTO: 89 FL (ref 80–99)
MONOCYTES # BLD: 0.4 K/UL (ref 0–1)
MONOCYTES NFR BLD: 4 % (ref 5–13)
NEUTS SEG # BLD: 8.7 K/UL (ref 1.8–8)
NEUTS SEG NFR BLD: 85 % (ref 32–75)
NRBC # BLD: 0 K/UL (ref 0–0.01)
NRBC BLD-RTO: 0 PER 100 WBC
PLATELET # BLD AUTO: 268 K/UL (ref 150–400)
PMV BLD AUTO: 9.7 FL (ref 8.9–12.9)
POTASSIUM SERPL-SCNC: 4.6 MMOL/L (ref 3.5–5.1)
RBC # BLD AUTO: 3.74 M/UL (ref 3.8–5.2)
SERVICE CMNT-IMP: ABNORMAL
SERVICE CMNT-IMP: ABNORMAL
SODIUM SERPL-SCNC: 131 MMOL/L (ref 136–145)
WBC # BLD AUTO: 10.2 K/UL (ref 3.6–11)

## 2019-08-23 PROCEDURE — 74011250636 HC RX REV CODE- 250/636: Performed by: INTERNAL MEDICINE

## 2019-08-23 PROCEDURE — 74011250637 HC RX REV CODE- 250/637: Performed by: INTERNAL MEDICINE

## 2019-08-23 PROCEDURE — 74011636637 HC RX REV CODE- 636/637: Performed by: INTERNAL MEDICINE

## 2019-08-23 PROCEDURE — 36415 COLL VENOUS BLD VENIPUNCTURE: CPT

## 2019-08-23 PROCEDURE — 94760 N-INVAS EAR/PLS OXIMETRY 1: CPT

## 2019-08-23 PROCEDURE — 94640 AIRWAY INHALATION TREATMENT: CPT

## 2019-08-23 PROCEDURE — 85025 COMPLETE CBC W/AUTO DIFF WBC: CPT

## 2019-08-23 PROCEDURE — 80048 BASIC METABOLIC PNL TOTAL CA: CPT

## 2019-08-23 PROCEDURE — 74011000250 HC RX REV CODE- 250: Performed by: INTERNAL MEDICINE

## 2019-08-23 PROCEDURE — 82550 ASSAY OF CK (CPK): CPT

## 2019-08-23 PROCEDURE — 82962 GLUCOSE BLOOD TEST: CPT

## 2019-08-23 RX ORDER — IPRATROPIUM BROMIDE AND ALBUTEROL SULFATE 2.5; .5 MG/3ML; MG/3ML
3 SOLUTION RESPIRATORY (INHALATION)
Status: DISCONTINUED | OUTPATIENT
Start: 2019-08-23 | End: 2019-08-23 | Stop reason: HOSPADM

## 2019-08-23 RX ORDER — INSULIN LISPRO 100 [IU]/ML
8 INJECTION, SOLUTION INTRAVENOUS; SUBCUTANEOUS ONCE
Status: COMPLETED | OUTPATIENT
Start: 2019-08-23 | End: 2019-08-23

## 2019-08-23 RX ORDER — MAGNESIUM SULFATE 100 %
4 CRYSTALS MISCELLANEOUS AS NEEDED
Status: DISCONTINUED | OUTPATIENT
Start: 2019-08-23 | End: 2019-08-23 | Stop reason: HOSPADM

## 2019-08-23 RX ORDER — DEXTROSE MONOHYDRATE 100 MG/ML
125-250 INJECTION, SOLUTION INTRAVENOUS AS NEEDED
Status: DISCONTINUED | OUTPATIENT
Start: 2019-08-23 | End: 2019-08-23 | Stop reason: HOSPADM

## 2019-08-23 RX ADMIN — LORAZEPAM 2 MG: 1 TABLET ORAL at 08:18

## 2019-08-23 RX ADMIN — PANTOPRAZOLE SODIUM 40 MG: 40 TABLET, DELAYED RELEASE ORAL at 08:17

## 2019-08-23 RX ADMIN — ARIPIPRAZOLE 30 MG: 5 TABLET ORAL at 08:27

## 2019-08-23 RX ADMIN — HYDROCODONE BITARTRATE AND ACETAMINOPHEN 1 TABLET: 10; 325 TABLET ORAL at 06:29

## 2019-08-23 RX ADMIN — HEPARIN SODIUM 5000 UNITS: 5000 INJECTION INTRAVENOUS; SUBCUTANEOUS at 00:23

## 2019-08-23 RX ADMIN — GABAPENTIN 300 MG: 300 CAPSULE ORAL at 08:18

## 2019-08-23 RX ADMIN — IPRATROPIUM BROMIDE AND ALBUTEROL SULFATE 3 ML: .5; 3 SOLUTION RESPIRATORY (INHALATION) at 08:09

## 2019-08-23 RX ADMIN — Medication 10 ML: at 02:45

## 2019-08-23 RX ADMIN — ATORVASTATIN CALCIUM 40 MG: 40 TABLET, FILM COATED ORAL at 08:17

## 2019-08-23 RX ADMIN — CARVEDILOL 6.25 MG: 6.25 TABLET, FILM COATED ORAL at 08:17

## 2019-08-23 RX ADMIN — SODIUM CHLORIDE 100 ML/HR: 900 INJECTION, SOLUTION INTRAVENOUS at 03:33

## 2019-08-23 RX ADMIN — INSULIN LISPRO 8 UNITS: 100 INJECTION, SOLUTION INTRAVENOUS; SUBCUTANEOUS at 04:23

## 2019-08-23 RX ADMIN — CITALOPRAM HYDROBROMIDE 40 MG: 20 TABLET ORAL at 08:18

## 2019-08-23 NOTE — TELEPHONE ENCOUNTER
Called, spoke to pt. Two identifiers confirmed. LANE scheduled for 8/30 @ 330 with Dr. Nando Iqbal.   Pt verbalized understanding of information discussed w/ no further questions at this time.

## 2019-08-23 NOTE — PROGRESS NOTES
CM was unable to meet with patient prior to her discharge. She was seen walking independently in the hallways and conversing with hospital staff - A&O x 3. Patient was picked up by her sister per staff. Patient has follow-up appt w/ Endocrinology and PCP information on AVS.    No 2nd IM needed as first letter received within past 48 hours.     Calderon Hinds RN, BSN, [address] [place] [address]     [phone]

## 2019-08-23 NOTE — CONSULTS
Endocrinology Consult    Asked by Dr. Nivia Muhammad to see this clinic patient of mine for management of diabetes. She states she was doing well from a diabetes standpoint on her insulin pump prior to admission. She decided to try an over the counter weight loss supplement called \"keto diet\" and took a dose on Tuesday. She started to develop swelling in her feet and this progressed to swelling of her lips and face over the next 24 hours and yesterday she took a large dose of liquid benadryl to try and help with the symptoms. She started to develop altered mental status and was taken to the ER yesterday and became more lethargic in the ER and was given narcan which woke her up. She was given a dose of IV solumedrol 125 mg at 6pm and then changed to IV solumedrol 40 mg every 8 hours x 3 doses. I placed an order for lantus 30 units daily along with NPH 40 units every 8 hours to be given along with the solumedrol and despite this her sugars ran 319 by lunch and 283 by dinner. She has had improvement in her swelling of her lips and face and legs with starting the steroids and has been eating better and had a nearly full plate of dinner of potatoes and peas and corn and pudding when I saw her at dinner time. Assessment/Plan:  1) Type 1 diabetes uncontrolled: her last A1c was 9.3% in 7/19. She was stable on her insulin pump prior to admission so will plan on resuming this upon discharge. I gave her a dose of 10 units of humalog to cover her dinner along with an additional 10 units of lantus and her sugar at bedtime is down to 190. Hopefully with coming off the steroids, it should be easier to control her diabetes tomorrow and her order for NPH has been discontinued now that she will no longer be on steroids. - increase lantus to 40 units daily  - cont humalog 10 units before meals  - cont humalog 2:50 > 150 for correction    Thanks for the consult. Please don't hesitate to call [phone] with further questions.     I spent 30 minutes of face to face time on her case and > 50% of the time was spent reviewing the chart and coordinating her care with the nurse caring for her.     Alvin Scott MD    Copy sent to:  Dr. Ansley Lopez [place]

## 2019-08-23 NOTE — PROGRESS NOTES
Patient requested that I call and get an insulin order. She has been checking her blood glucose using personal glucometer through the night, and stated it had been rising steadily and was now 309 per her personal glucometer. Stated she would take her own insulin if necessary. 1) Counseled her to not take her personal insulin without physician order due to risk of overdose  2) rechecked using hospital glucometer:   3) paged Physician on call for endocrinologist.  Dr Asha Barbosa responded. Reviewed HS level (190) and current blood glucose level (322) and sliding scale order. Dr Asha Barbosa ordered a one-time dose of 8 units lispro for current . Repeated order for confirmation and entered.

## 2019-08-23 NOTE — TELEPHONE ENCOUNTER
Fe TIAN Bailee   Phone Number: [phone]             Pt states she was d/s from [facility] on today and would like to make a follow up appt. However, there are no available appts.        Message received & copied from [facility]

## 2019-08-23 NOTE — DISCHARGE INSTRUCTIONS
[employer] Activation    Thank you for requesting access to [employer]. Please follow the instructions below to securely access and download your online medical record. [employer] allows you to send messages to your doctor, view your test results, renew your prescriptions, schedule appointments, and more. How Do I Sign Up? 1. In your internet browser, go to [email or web address]  2. Click on the First Time User? Click Here link in the Sign In box. You will be redirect to the New Member Sign Up page. 3. Enter your [employer] Access Code exactly as it appears below. You will not need to use this code after youve completed the sign-up process. If you do not sign up before the expiration date, you must request a new code. [employer] Access Code: Activation code not generated  Current [employer] Status: Active (This is the date your [employer] access code will )    4. Enter the last four digits of your Social Security Number (xxxx) and Date of Birth (mm/dd/yyyy) as indicated and click Submit. You will be taken to the next sign-up page. 5. Create a [employer] ID. This will be your [employer] login ID and cannot be changed, so think of one that is secure and easy to remember. 6. Create a [employer] password. You can change your password at any time. 7. Enter your Password Reset Question and Answer. This can be used at a later time if you forget your password. 8. Enter your e-mail address. You will receive e-mail notification when new information is available in [address] 9. Click Sign Up. You can now view and download portions of your medical record. 10. Click the Download Summary menu link to download a portable copy of your medical information. Additional Information    If you have questions, please visit the Frequently Asked Questions section of the [employer] website at [email or web address] Remember, [employer] is NOT to be used for urgent needs. For medical emergencies, dial 911. HOSPITALIST DISCHARGE INSTRUCTIONS    NAME: Starr Forte   :  1970   MRN:  [id number]     Date/Time:  2019 9:11 AM    ADMIT DATE: 2019     DISCHARGE DATE: 2019     DISCHARGE DIAGNOSIS:  Acute Metabolic Encephalopathy POA - resolved now, back to baseline  Likely due to Benadryl overuse/overdose + Chronic Pain & Psych meds  + DEBORAH POA- Cr down to 1.2 today, resolved  Angioedema POA- ?allergic reaction to OTC Weight loss medication as per pt, resolved now  DM type 1 on insulin pump at home  Hypertension  Psych  disorders  Chronic Pain syndrome    Active Problems:    Uncontrolled type I diabetes mellitus with neuropathy (HCC) ()      Overview: age 25      Severe obesity ([facility] (2019)      Drug overdose (2019)      Change in mental status (2019)      DEBORAH (acute kidney injury) ([facility] (2019)         MEDICATIONS:  As per medication reconciliation  list  · It is important that you take the medication exactly as they are prescribed. · Keep your medication in the bottles provided by the pharmacist and keep a list of the medication names, dosages, and times to be taken in your wallet. · Do not take other medications without consulting your doctor. Pain Management: per above medications    What to do at Home    Recommended diet:  Cardiac Diet, Diabetic Diet and Low fat, Low cholesterol    Recommended activity: Activity as tolerated    If you have questions regarding the hospital related prescriptions or hospital related issues please call [facility] . If you experience any of the following symptoms then please call your primary care physician or return to the emergency room if you cannot get hold of your doctor:  Fever, chills, nausea, vomiting, diarrhea, change in mentation, falling, bleeding, shortness of breath,     Follow Up:  Dr. Carpio Patient, DO  you are to call and set up an appointment to see them in 7-10 days.   Dr Dominick Rivers (endocrine doctor) in office as arranged    Information obtained by :  I understand that if any problems occur once I am at home I am to contact my physician. I understand and acknowledge receipt of the instructions indicated above.                                                                                                                                            Physician's or R.N.'s Signature                                                                  Date/Time                                                                                                                                              Patient or Representative Signature                                                          Date/Time

## 2019-08-23 NOTE — PROGRESS NOTES
Discharge instruction explained and given to pt. No new meds was ordered  by M.D. Dr. France Licea accessed pt's insulin pump this am.pt. left without complaints.

## 2019-08-23 NOTE — PROGRESS NOTES
ADULT PROTOCOL: JET AEROSOL  REASSESSMENT    Patient  Alix Sandhoff     51 y.o.   female     8/23/2019  9:39 AM    Breath Sounds Pre Procedure: Right Breath Sounds: Clear                               Left Breath Sounds: Clear    Breath Sounds Post Procedure: Right Breath Sounds: Clear                                 Left Breath Sounds: Clear    Breathing pattern: Pre procedure Breathing Pattern: Regular          Post procedure Breathing Pattern: Regular    Heart Rate: Pre procedure Pulse: 71           Post procedure Pulse: 76    Resp Rate: Pre procedure Respirations: 20           Post procedure Respirations: 20            Oxygen: O2 Device: Room air        Changed: NO    SpO2: Pre procedure SpO2: 95 %   without oxygen              Post procedure SpO2: 96 %  without oxygen    Nebulizer Therapy: Current medications Aerosolized Medications: DuoNeb      Changed: YES    Smoking History: current smoker    Problem List:   Patient Active Problem List   Diagnosis Code    Hyperlipidemia LDL goal <100 E78.5    Uncontrolled type I diabetes mellitus with neuropathy (HCC) E10.40, E10.65    Endometriosis N80.9    Abnormal weight gain R63.5    Elevated LFTs R94.5    Positive blood culture R78.81    Tobacco abuse Z72.0    Anxiety and depression F41.9, F32.9    DKA (diabetic ketoacidoses) ([facility]) E13.10    Aspiration pneumonia ([facility]) J69.0    Essential hypertension I10    Diabetic nephropathy associated with type 1 diabetes mellitus ([facility]) E10.21    Severe obesity ([facility]) E66.01    COPD exacerbation ([facility]) J44.1    Drug overdose T50.901A    Change in mental status R41.82    DEB[facility]AH (acute kidney injury) ([facility] N17.9       Respiratory Therapist: Marilyn Fisher, RT

## 2019-08-23 NOTE — DISCHARGE SUMMARY
Hospitalist Discharge Summary     Patient ID:  Js Case  [id number]  59 y.o.  1970  8/21/2019    PCP on record: Juan C Duran DO    Admit date: 8/21/2019  Discharge date and time: 8/23/2019    DISCHARGE DIAGNOSIS:    Acute Metabolic Encephalopathy POA - resolved now, back to baseline  Likely due to Benadryl overuse/overdose + Chronic Pain & Psych meds  + DEBORAH POA- Cr down to 1.2 today, resolved  Angioedema POA- ?allergic reaction to OTC Weight loss medication as per pt, resolved now  DM type 1 on insulin pump at home  Hypertension  Psych  disorders  Chronic Pain syndrome    CONSULTATIONS:  IP CONSULT TO NEPHROLOGY  IP CONSULT TO ENDOCRINOLOGY    Excerpted HPI from H&P of Nadya Starkey MD:  \"52 years old female from home with past medical history significant for DM, hypertension, hyperlipidemia, Crohn's disease, chronic pain presented to the hospital for evaluation of upper lip swelling associated with bilateral ankle swelling started today associated with change mental status, patient stated she took Benadryl because of lip swelling initially she thought she has some allergic reaction, blood work in ED show worsening kidney function creatinine 2.34 BUN 53, drug screen was done it was positive for opioid, patient mental status improved after Narcan was given.     We were asked to admit for work up and evaluation of the above problems. \"    ______________________________________________________________________  DISCHARGE SUMMARY/HOSPITAL COURSE:  for full details see H&P, daily progress notes, labs, consult notes.      Acute Metabolic Encephalopathy POA - resolved today AM  Likely due to Benadryl overuse/overdose + Chronic Pain & Psych meds + DEBORAH POA- Cr down to 1.7 (2.3 POA)  Renal USG= negative for hydro     Cont IVF  BMP in AM  Nephrology consulted in ER  Resume some home Psych & pain meds today     Angioedema POA- ?allergic reaction to OTC Weight loss medication as per pt  Cont IV Solu-Medrol for now- taper quickly with response as pt is Type 1 Dm- will cause hyperglycemia  Monitoring closely    DM type 1 on insulin pump at home  IP endocrine consulted Dr Roselia Sloan for help manage BS on steroids    Hypertension  Continue home antihypertensive medication    GERD  Continue PPI    Psych  disorders  Resume some psych medications now as pt close to baseline MS now    Hyperlipidemia  Continue Lipitor 40 mg daily     COPD exacerbation - mild  Cont on Solumedrol for now  -Duoneb nebulizer         Code Status: Full   Surrogate Decision Maker:     DVT Prophylaxis: Heparin   GI Prophylaxis: not indicated           _______________________________________________________________________  Patient seen and examined by me on discharge day. Pertinent Findings:  Gen:    Not in distress, Obese +  HEENT: Lips not swollen today +  Chest: Clear lungs  CVS:   Regular rhythm. No edema  Abd:  Soft, not distended, not tender  Neuro:  Alert, oriented x 3, MS at baseline  _______________________________________________________________________  DISCHARGE MEDICATIONS:   Current Discharge Medication List      CONTINUE these medications which have NOT CHANGED    Details   !! umeclidinium-vilanterol (ANORO ELLIPTA) 62.5-25 mcg/actuation inhaler Take 1 Puff by inhalation daily. Qty: 1 Inhaler, Refills: 0      !! umeclidinium-vilanterol (ANORO ELLIPTA) 62.5-25 mcg/actuation inhaler Take 1 Puff by inhalation daily. Qty: 1 Inhaler, Refills: 11      !! insulin lispro (HUMALOG) 100 unit/mL injection sample  Qty: 2 Vial, Refills: 0      albuterol (PROVENTIL HFA, VENTOLIN HFA, PROAIR HFA) 90 mcg/actuation inhaler Take 1-2 Puffs by inhalation every four (4) hours as needed for Wheezing or Shortness of Breath. Qty: 1 Inhaler, Refills: 0      insulin pump (PATIENT SUPPLIED) misc by SubCUTAneous route as needed. !! furosemide (LASIX) 20 mg tablet Take 40 mg by mouth daily.  Patient takes 60 mg in the morning and 40 mg in the afternoon. HYDROcodone-acetaminophen (NORCO)  mg tablet Take 1 Tab by mouth. carvedilol (COREG) 25 mg tablet Take 6.25 mg by mouth daily. LORazepam (ATIVAN) 2 mg tablet Take 2 mg by mouth four (4) times daily. losartan (COZAAR) 25 mg tablet Take 6.25 mg by mouth daily. citalopram (CELEXA) 20 mg tablet Take 40 mg by mouth daily. traZODone (DESYREL) 50 mg tablet Take 100 mg by mouth nightly. !! insulin lispro (HUMALOG) 100 unit/mL injection Use as directed for insulin pump. Max 100 units per day--BILL MEDICARE PART B--DX E10.65--REPLACES NOVOLOG  Qty: 3 Vial, Refills: 11      CONTOUR NEXT TEST STRIPS strip Use as directed to test up to 8 x per day,  Uncontrolled Type 1 diabetes with Neuropathy (HCC) E10.40, E10.65  Qty: 250 Strip, Refills: 11      Insulin Syringe-Needle U-100 0.5 mL 31 gauge x 5/16\" syrg Use as directed up to 4 times daily  Qty: 100 Syringe, Refills: 11      !! furosemide (LASIX) 20 mg tablet Take 60 mg by mouth daily. Patient takes 60 mg in the morning and 40 mg in the afternoon. glucagon (GLUCAGON EMERGENCY KIT, HUMAN,) 1 mg injection USE A DIRECTED  Qty: 2 Vial, Refills: 11      esomeprazole (NEXIUM) 40 mg capsule TAKE ONE CAPSULE BY MOUTH DAILY  Qty: 30 Cap, Refills: 8      Insulin Needles, Disposable, (BD ULTRA-FINE MINI PEN NEEDLE) 31 gauge x 3/16\" ndle Use as directed up to 8 times daily  Qty: 200 Pen Needle, Refills: 11      gabapentin (NEURONTIN) 300 mg capsule TAKE TWO CAPSULES BY MOUTH THREE TIMES A DAY  Qty: 180 Cap, Refills: 11      atorvastatin (LIPITOR) 40 mg tablet Take 1 Tab by mouth daily. Qty: 90 Tab, Refills: 3      levonorgestrel (MIRENA) 20 mcg/24 hr (5 years) IUD 1 Device by IntraUTERine route once. ARIPiprazole (ABILIFY) 30 mg tablet Take 30 mg by mouth daily. fentaNYL (DURAGESIC) 100 mcg/hr PATCH 1 Patch by TransDERmal route every seventy-two (72) hours.       FREESTYLE MARIELA SENSOR kit Use 1 sensor every 10 days as directed  Qty: 3 Each, Refills: 11    Associated Diagnoses: Diabetic nephropathy associated with type 1 diabetes mellitus ([address]       ! ! - Potential duplicate medications found. Please discuss with provider. Patient Follow Up Instructions:    Activity: Activity as tolerated  Diet: Cardiac Diet, Diabetic Diet and Low fat, Low cholesterol    Follow-up with Dr Rao Cho (endocrine) as arranged next week  Follow-up Information     Follow up With Specialties Details [address], Silvio Harley DO Internal Medicine   [address]  [address]  [address] [phone]          ________________________________________________________________    Risk of deterioration: Low    Condition at Discharge:  Stable  __________________________________________________________________    Disposition  Home with family, no needs    ____________________________________________________________________    Code Status: Full Code  ___________________________________________________________________      Total time in minutes spent coordinating this discharge (includes going over instructions, follow-up, prescriptions, and preparing report for sign off to her PCP) :  36 minutes    Signed:  Avis Armendariz MD

## 2019-08-23 NOTE — TELEPHONE ENCOUNTER
Pt discharging from [address] today, 8-23-19 pt needs a LANE in 7-10 days. Please call to schedule. Thanks.

## 2019-08-23 NOTE — ROUTINE PROCESS
* No surgery found *  * No surgery found *  Bedside and Verbal shift change report given to [address] (oncoming nurse) by Amado Haynes RN (offgoing nurse). Report included the following information SBAR, Kardex, MAR and Recent Results. Zone Phone:   [phone]      Significant changes during shift:  See note about blood glucose check during night.           Patient Information    Norah Santana  51 y.o.  8/21/2019  4:34 PM by Kevin Strong MD. Norah Santana was admitted from Home    Problem List    Patient Active Problem List    Diagnosis Date Noted    DEBORAH (acute kidney injury) ([facility] 08/22/2019    Drug overdose 08/21/2019    Change in mental status 08/21/2019    COPD exacerbation ([address] 07/10/2019    Severe obesity ([address] 01/25/2019    Diabetic nephropathy associated with type 1 diabetes mellitus ([address] 05/11/2018    Essential hypertension 12/15/2017    Aspiration pneumonia ([address] 11/26/2017    DKA (diabetic ketoacidoses) ([address] 11/20/2017    Anxiety and depression 11/14/2017    Positive blood culture 11/07/2012    Tobacco abuse 11/07/2012    Elevated LFTs 09/06/2012    Abnormal weight gain 12/22/2011    Endometriosis 03/22/2011    Hyperlipidemia LDL goal <100     Uncontrolled type I diabetes mellitus with neuropathy (HCC)      Past Medical History:   Diagnosis Date    Achilles tendon rupture     along with torn right patellar/femoral ligament    Arthritis     rt. foot    Chronic kidney disease     Chronic pain     abdominal pain    Diabetes (HCC)     IDDM on insulin pump and sensor    DM type 1 (diabetes mellitus, type 1) ([address]     age 25    Endometriosis     s/p ex-lap 4x    Gastric ulcer     GERD (gastroesophageal reflux disease)     Herpes     Other and unspecified hyperlipidemia     Panic attacks     Psychiatric disorder     anxiety, panic attacks    PTSD (post-traumatic stress disorder)     Unspecified essential hypertension          Core Measures:    CVA: No Not applicable  CHF:No Not applicable  PNA:No Not applicable    Activity Status:    OOB to Chair Yes  Ambulated this shift Yes   Bed Rest No    Supplemental O2: (If Applicable)    NC No  NRB No  Venti-mask No  On room air Liters/min      LINES AND DRAINS:    PIV    DVT prophylaxis:    DVT prophylaxis Med- Yes  DVT prophylaxis SCD or KRIS- No     Wounds: (If Applicable)    Wounds- No    Location none    Patient Safety:    Falls Score Total Score: 1  Safety Level_______  Bed Alarm On? No  Sitter?  No    Plan for upcoming shift: possible discharge        Discharge Plan: No no cm note    Active Consults:  IP CONSULT TO NEPHROLOGY  IP CONSULT TO ENDOCRINOLOGY

## 2019-08-25 NOTE — PROGRESS NOTES
Spiritual Care Partner Volunteer visited patient in Neuro unit on August 23, 2019.       Documented on August 25 by:    DYLON Morel, [address] [facility],   KIANNA FLORES [facility] Paging Service  [phone])

## 2019-08-26 ENCOUNTER — PATIENT OUTREACH (OUTPATIENT)
Dept: INTERNAL MEDICINE CLINIC | Age: 49
End: 2019-08-26

## 2019-08-26 NOTE — PROGRESS NOTES
Hospital Discharge Follow-Up      Date/Time:  8/26/2019 9:14 AM    Patient was admitted to [facility] on 8/21/19 and discharged on 8/23/19 for c/o upper lip swelling, bilateral ankle edema. The physician discharge summary was available at the time of outreach. Patient was contacted within 1 business days of discharge. Top Challenges reviewed with the provider   Hospital Discharge Diet: Cardiac Diet, Diabetic Diet and Low fat, Low cholesterol      - IP CM was unable to meet with patient prior to hospital discharge    - to f/u with Dr. Cami Catalan (Endocrinology) next week following discharge; currently has appt scheduled for 9/17/19    - has previously scheduled LANE appt scheduled with Dr. Richard Gamble 8/30/19     Method of communication with provider :chart routing    Inpatient RRAT score: 21  Was this a readmission? no   Patient stated reason for the readmission: n/a      Nurse Navigator (NN) attempted to contact the patient by telephone to perform post hospital discharge assessment; lvm requesting a return phone call to this NN. Decalog message sent to patient to notify of NN telephonic outreach attempt and with SHORTY MAC appointment reminder. Disease Specific:   COPD    Summary of patient's top problems:  1. Acute Metabolic Encephalopathy: likely due to benadryl overuse/overdose, chronic pain and psych meds, DEBORAH. Reported to ED Team that she \"chugged\" liquid Benadryl prior to ED arrival for treatment of lip swelling; ED Staff examined patient's purse and estimated that patient drank approximately 250 mg of Benadryl.  resolved, back to baseline    2. DEBORAH :Nephrology consulted. Creatinine 2.34(H) on admission; trended down to 1.25(H) at discharge. resolved    3. Angioedema: Upper Lip Swelling. Possible allergic reaction to OTC Weight loss medication (Ketogenic Diet Supplement) as per pt.  received IV Solu-Medrol this admission. Resolved.     4. COPD exacerbation - mild: received IV solu-medrol this admission, duo-neb nebulizer. Pulmonology not consulted. 5. DM type 1 on insulin pump at home: diagnosed at age 25. Endocrinology consulted this admission for assistance with managing blood glucose while pt receiving steroids. Most recent hemoglobin a1c 7/11/19 was 9.3% which is improved from previous result 5/21/19 of 10.1%. Pt followed by Dr. Jam Wright in outpatient setting for management of Type 1 DM. Pt to f/u with Dr. Jam Wright next week following hospital discharge, however per chart review, appointment is scheduled for 9/17/19.   [facility] Xenapto orders at discharge: [address]: n/a  Date of initial visit: [facility] ordered/company: none  Durable Medical Equipment received: n/a    Barriers to care? NN unable to reach patient today. Advance Care Planning:   Does patient have an Advance Directive:  not on file     Medication(s):   New Medications at Discharge: none  Changed Medications at Discharge: none  Discontinued Medications at Discharge: none        [address] follow up appointment(s):   Future Appointments   Date Time Provider Wei Jean   8/30/2019  3:30 PM Wallace, [address]   9/17/2019  9:10 AM Karina Keller MD RDE [facility]      Non-BS follow up appointment(s): none  [facility]  no indication in IP documentation to suggest that [facility] referral for post-acute visit was made at hospital discharge. NN unable to reach patient today to offer [facility].

## 2019-08-27 ENCOUNTER — PATIENT OUTREACH (OUTPATIENT)
Dept: INTERNAL MEDICINE CLINIC | Age: 49
End: 2019-08-27

## 2019-08-27 NOTE — PROGRESS NOTES
Nurse Navigator (NN) made second attempt to contact the patient by telephone to perform post hospital discharge assessment; lvm requesting a return phone call to this NN.

## 2019-08-28 ENCOUNTER — TELEPHONE (OUTPATIENT)
Dept: ENDOCRINOLOGY | Age: 49
End: 2019-08-28

## 2019-08-28 NOTE — TELEPHONE ENCOUNTER
Patient called and stated that she was going to the gym however she felt dizzy so she stopped at a store to check her blood pressure. The first time it was checked it was 88/52 and the second time it was check 73/48  Patient stated she is taking 1 carvedilol daily and1 losartan daily since her release from the hospital. She stated she did not have the meds with her however she thinks her dosage is 6.25. She stated she has been taking this dose since her discharge from the hospital.  I spoke with Dr. Sony Valdovinos and per him she is suppose to be taking 1/4 of the  25 mg tablet. I spoke with patient again and she stated that she has not been taking 1/4 of the tablet but yet the whole pill  which is 25 mg   She stated she did not realize it was a different dose. I informed her per Dr. Sony Valdovinos to drink plenty of water today   And to be sure she only take a 1/4 of the carvedilol and the losartan tablet on tomorrow. Patient voiced understanding and stated she will purchase a pill cutter.   Patient reminded that the directions are on the pill body and to call us back with an update tomorrow

## 2019-08-29 ENCOUNTER — TELEPHONE (OUTPATIENT)
Dept: ENDOCRINOLOGY | Age: 49
End: 2019-08-29

## 2019-08-29 NOTE — TELEPHONE ENCOUNTER
Patient called, just to let you know her blood pressure is much better. She appreciated everything that was done so quickly to help her out. ROSIBEL. .. Isabel Mcdonald

## 2019-08-30 ENCOUNTER — TELEPHONE (OUTPATIENT)
Dept: INTERNAL MEDICINE CLINIC | Age: 49
End: 2019-08-30

## 2019-08-30 ENCOUNTER — OFFICE VISIT (OUTPATIENT)
Dept: INTERNAL MEDICINE CLINIC | Age: 49
End: 2019-08-30

## 2019-08-30 ENCOUNTER — PATIENT OUTREACH (OUTPATIENT)
Dept: INTERNAL MEDICINE CLINIC | Age: 49
End: 2019-08-30

## 2019-08-30 VITALS
HEART RATE: 81 BPM | DIASTOLIC BLOOD PRESSURE: 72 MMHG | HEIGHT: 65 IN | SYSTOLIC BLOOD PRESSURE: 116 MMHG | RESPIRATION RATE: 18 BRPM | OXYGEN SATURATION: 96 % | BODY MASS INDEX: 36.79 KG/M2 | WEIGHT: 220.8 LBS

## 2019-08-30 DIAGNOSIS — F31.9 BIPOLAR 1 DISORDER (HCC): ICD-10-CM

## 2019-08-30 DIAGNOSIS — Z00.00 MEDICARE ANNUAL WELLNESS VISIT, SUBSEQUENT: ICD-10-CM

## 2019-08-30 DIAGNOSIS — G47.33 OSA (OBSTRUCTIVE SLEEP APNEA): ICD-10-CM

## 2019-08-30 DIAGNOSIS — F11.20 NARCOTIC DEPENDENCE (HCC): ICD-10-CM

## 2019-08-30 DIAGNOSIS — N17.9 AKI (ACUTE KIDNEY INJURY) (HCC): ICD-10-CM

## 2019-08-30 DIAGNOSIS — E10.21 DIABETIC NEPHROPATHY ASSOCIATED WITH TYPE 1 DIABETES MELLITUS (HCC): ICD-10-CM

## 2019-08-30 DIAGNOSIS — Z72.0 TOBACCO ABUSE: ICD-10-CM

## 2019-08-30 DIAGNOSIS — I10 ESSENTIAL HYPERTENSION: ICD-10-CM

## 2019-08-30 DIAGNOSIS — T78.3XXS ANGIOEDEMA, SEQUELA: Primary | ICD-10-CM

## 2019-08-30 RX ORDER — CARVEDILOL 6.25 MG/1
6.25 TABLET ORAL DAILY
Qty: 90 TAB | Refills: 3 | Status: SHIPPED | OUTPATIENT
Start: 2019-08-30 | End: 2019-12-03 | Stop reason: SDUPTHER

## 2019-08-30 RX ORDER — CARVEDILOL 6.25 MG/1
6.25 TABLET ORAL DAILY
COMMUNITY
End: 2019-08-30 | Stop reason: SDUPTHER

## 2019-08-30 NOTE — PATIENT INSTRUCTIONS
Office Policies    Phone calls/patient messages:            Please allow up to 24 hours for someone in the office to contact you about your call or message. Be mindful your provider may be out of the office or your message may require further review. We encourage you to use [employer] for your messages as this is a faster, more efficient way to communicate with our office                         Medication Refills:            Prescription medications require 48-72 business hours to process. We encourage you to use [employer] for your refills. For controlled medications: Please allow 72 business hours to process. Certain medications may require you to  a written prescription at our office. NO narcotic/controlled medications will be prescribed after 4pm Monday through Friday or on weekends              Form/Paperwork Completion:            Please note a $25 fee may incur for all paperwork for completed by our providers. We ask that you allow 7-10 business days. Pre-payment is due prior to picking up/faxing the completed form. You may also download your forms to [employer] to have your doctor print off. Medicare Wellness Visit, Female     The best way to live healthy is to have a lifestyle where you eat a well-balanced diet, exercise regularly, limit alcohol use, and quit all forms of tobacco/nicotine, if applicable. Regular preventive services are another way to keep healthy. Preventive services (vaccines, screening tests, monitoring & exams) can help personalize your care plan, which helps you manage your own care. Screening tests can find health problems at the earliest stages, when they are easiest to treat. [facility] follows the current, evidence-based guidelines published by the [facility] [place] (USPSTF) when recommending preventive services for our patients.  Because we follow these guidelines, sometimes recommendations change over time as research supports it. (For example, mammograms used to be recommended annually. Even though Medicare will still pay for an annual mammogram, the newer guidelines recommend a mammogram every two years for women of average risk.)  Of course, you and your doctor may decide to screen more often for some diseases, based on your risk and your health status. Preventive services for you include:  - Medicare offers their members a free annual wellness visit, which is time for you and your primary care provider to discuss and plan for your preventive service needs. Take advantage of this benefit every year!  -All adults over the age of 72 should receive the recommended pneumonia vaccines. Current USPSTF guidelines recommend a series of two vaccines for the best pneumonia protection.   -All adults should have a flu vaccine yearly and a tetanus vaccine every 10 years. All adults age 61 and older should receive a shingles vaccine once in their lifetime.    -A bone mass density test is recommended when a woman turns 65 to screen for osteoporosis. This test is only recommended one time, as a screening. Some providers will use this same test as a disease monitoring tool if you already have osteoporosis. -All adults age 38-68 who are overweight should have a diabetes screening test once every three years.   -Other screening tests and preventive services for persons with diabetes include: an eye exam to screen for diabetic retinopathy, a kidney function test, a foot exam, and stricter control over your cholesterol.   -Cardiovascular screening for adults with routine risk involves an electrocardiogram (ECG) at intervals determined by your doctor.   -Colorectal cancer screenings should be done for adults age 54-65 with no increased risk factors for colorectal cancer. There are a number of acceptable methods of screening for this type of cancer. Each test has its own benefits and drawbacks.  Discuss with your doctor what is most appropriate for you during your annual wellness visit. The different tests include: colonoscopy (considered the best screening method), a fecal occult blood test, a fecal DNA test, and sigmoidoscopy. -Breast cancer screenings are recommended every other year for women of normal risk, age 54-69.  -Cervical cancer screenings for women over age 72 are only recommended with certain risk factors.   -All adults born between [facility][1] should be screened once for Hepatitis C. Here is a list of your current Health Maintenance items (your personalized list of preventive services) with a due date:  Health Maintenance Due   Topic Date Due    Colonoscopy  02/28/2019    Albumin Urine Test  04/24/2019    Flu Vaccine  08/01/2019    Annual Well Visit  08/24/2019       Don't take any more HydroSlim. Make an appt to see dr Chalo Epps for colonoscopy.

## 2019-08-30 NOTE — PROGRESS NOTES
Fco Schroeder is a 51 y.o. female who presents for evaluation of transition of care and awv. Last seen by me July 18, 2019 in transition of care for copd exac. Was inpt [facility] aug 21-23 with encephalopathy, due to unintentional benadryl overdose. Was taking a weight loss medicine called HydroSlim from [facility], and she developed hives from it, which prompted her to take excess amount of benadryl. Symptoms all resolved by time she was d/c from hospital.  Back to her baseline now. Saw pulmonary at some time, and they suggested sleep study, but did not refer her to anybody nor schedule one for her at their office. Friend is with her again today.       ROS:  Constitutional: negative for fevers, chills, anorexia and weight loss  Eyes:   negative for visual disturbance and irritation  ENT:   negative for tinnitus,sore throat,nasal congestion,ear pain,hoarseness  Respiratory:  negative for cough, hemoptysis, dyspnea,wheezing  CV:   negative for chest pain, palpitations, lower extremity edema  GI:   negative for nausea, vomiting, diarrhea, abdominal pain,melena  Genitourinary: negative for frequency, dysuria and hematuria  Musculoskel: negative for myalgias, arthralgias, back pain, muscle weakness, joint pain  Neurological:  negative for headaches, dizziness, focal weakness, numbness  Psychiatric:     Negative for depression or anxiety      Past Medical History:   Diagnosis Date    Achilles tendon rupture     along with torn right patellar/femoral ligament    Arthritis     rt. foot    Chronic kidney disease     Chronic pain     abdominal pain    Diabetes (HCC)     IDDM on insulin pump and sensor    DM type [facility] mellitus, type [facility]) ([facility]     age 25    Endometriosis     s/p ex-lap 4x    Gastric ulcer     GERD (gastroesophageal reflux disease)     Herpes     Other and unspecified hyperlipidemia     Panic attacks     Psychiatric disorder     anxiety, panic attacks    PTSD (post-traumatic stress disorder)     Unspecified essential hypertension        Past Surgical History:   Procedure Laterality Date    HX COLONOSCOPY      HX GYN      2 d&c's    HX GYN      laparoscopies x5 for endometriosis    HX GYN      mirena in place    HX ORTHOPAEDIC  2002    achiles tendon repair,talus repair    HX ORTHOPAEDIC      injections x2 to right shoulder    HX ORTHOPAEDIC Left 02/07/2019    3rd trigger finger release       Family History   Problem Relation Age of Onset    Diabetes Mother         diet controlled    Diabetes Father         R foot amupation    Liver Disease Father     Heart Disease Paternal Uncle         MI in his 46s    Stroke Neg Hx        Social History     Socioeconomic History    Marital status: SINGLE     Spouse name: Not on file    Number of children: Not on file    Years of education: Not on file    Highest education level: Not on file   Occupational History    Not on file   Social Needs    Financial resource strain: Not on file    Food insecurity:     Worry: Not on file     Inability: Not on file    Transportation needs:     Medical: Not on file     Non-medical: Not on file   Tobacco Use    Smoking status: Current Some Day Smoker     Packs/day: 0.75     Years: 17.00     Pack years: 12.75     Types: Cigarettes    Smokeless tobacco: Current User    Tobacco comment: Trying to quit   Substance and Sexual Activity    Alcohol use: Yes     Comment: a beer every few months    Drug use: No     Types: OTC, Prescription    Sexual activity: Yes     Partners: Male   Lifestyle    Physical activity:     Days per week: Not on file     Minutes per session: Not on file    Stress: Not on file   Relationships    Social connections:     Talks on phone: Not on file     Gets together: Not on file     Attends [employer] service: Not on file     Active member of club or organization: Not on file     Attends meetings of clubs or organizations: Not on file     Relationship status: Not on file   [address] [place] [address] Intimate partner violence:     Fear of current or ex partner: Not on file     Emotionally abused: Not on file     Physically abused: Not on file     Forced sexual activity: Not on file   Other Topics Concern    Not on file   Social History Narrative    Not on file            Visit Vitals  /72 (BP 1 Location: Right arm, BP Patient Position: Sitting)   Pulse 81   Resp 18   Ht 5' 5\" (1.651 m)   Wt 220 lb 12.8 oz (100.2 kg)   SpO2 96%   BMI 36.74 kg/m²       Physical Examination:   General - Well appearing female  HEENT - PERRL, TM no erythema/opacification, normal nasal turbinates, no oropharyngeal erythema or exudate, MMM  Neck - supple, no bruits, no thyroidomegaly, no lymphadenopathy  Pulm - clear to auscultation bilaterally  Cardio - RRR, normal S1 S2, no murmur  Abd - soft, nontender, no masses, no HSM  Extrem - no edema, +2 distal pulses  Neuro-  No focal deficits, CN intact     Assessment/Plan:    1. Metabolic encephalopathy, from unintentional benadryl overdose--resolved  2. Hives--resolved, from allergy to HydroSlim that she got from [employer]  3.  charo--referral to sleep team, dr Derrell Mendoza  4. Copd--doing well with anoro  5.  gerd--continue nexium  6.  htn--on lower doses now of coreg and cozaar. On 6.25 mg daily of both  7.  hyperlipids--on lipitor  8. Uncontrolled dm, type 1--on insulin pump. Follows with dr eJsus Kunz. Last a1c 9.3  9. Bipolar--continue abilify, celexa  10. Chronic pain syndrome--on fentanyl, norco, and neurontin  11. Tobacco abuse--down to 2 cigarettes per day. rtc 6 months. Reminded to schedule colonoscopy with dr Lois Byers, who sent her a certified letter. Red Catinadler III, DO            This is the Subsequent Medicare Annual Wellness Exam, performed 12 months or more after the Initial AWV or the last Subsequent AWV    I have reviewed the patient's medical history in detail and updated the computerized patient record.      History     Past Medical History:   Diagnosis Date    Achilles tendon rupture     along with torn right patellar/femoral ligament    Arthritis     rt. foot    Chronic kidney disease     Chronic pain     abdominal pain    Diabetes (HCC)     IDDM on insulin pump and sensor    DM type 1 (diabetes mellitus, type 1) ([facility]     age 25    Endometriosis     s/p ex-lap 4x    Gastric ulcer     GERD (gastroesophageal reflux disease)     Herpes     Other and unspecified hyperlipidemia     Panic attacks     Psychiatric disorder     anxiety, panic attacks    PTSD (post-traumatic stress disorder)     Unspecified essential hypertension       Past Surgical History:   Procedure Laterality Date    HX COLONOSCOPY      HX GYN      2 d&c's    HX GYN      laparoscopies x5 for endometriosis    HX GYN      mirena in place    HX ORTHOPAEDIC  2002    achiles tendon repair,talus repair    HX ORTHOPAEDIC      injections x2 to right shoulder    HX ORTHOPAEDIC Left 02/07/2019    3rd trigger finger release     Current Outpatient Medications   Medication Sig Dispense Refill    carvedilol (COREG) 6.25 mg tablet Take 6.25 mg by mouth daily.  umeclidinium-vilanterol (ANORO ELLIPTA) 62.5-25 mcg/actuation inhaler Take 1 Puff by inhalation daily. 1 Inhaler 11    albuterol (PROVENTIL HFA, VENTOLIN HFA, PROAIR HFA) 90 mcg/actuation inhaler Take 1-2 Puffs by inhalation every four (4) hours as needed for Wheezing or Shortness of Breath. 1 Inhaler 0    insulin pump (PATIENT SUPPLIED) misc by SubCUTAneous route as needed.  HYDROcodone-acetaminophen (NORCO)  mg tablet Take 1 Tab by mouth.  LORazepam (ATIVAN) 2 mg tablet Take 2 mg by mouth four (4) times daily.  losartan (COZAAR) 25 mg tablet Take 6.25 mg by mouth daily.  citalopram (CELEXA) 20 mg tablet Take 40 mg by mouth daily.  traZODone (DESYREL) 50 mg tablet Take 100 mg by mouth nightly.       insulin lispro (HUMALOG) 100 unit/mL injection Use as directed for insulin pump.  Max 100 units per day--BILL MEDICARE PART B--DX E10.65--REPLACES NOVOLOG 3 Vial 11    CONTOUR NEXT TEST STRIPS strip Use as directed to test up to 8 x per day,  Uncontrolled Type 1 diabetes with Neuropathy (HCC) E10.40, E10.65 250 Strip 11    Insulin Syringe-Needle U-100 0.5 mL 31 gauge x 5/16\" syrg Use as directed up to 4 times daily 100 Syringe 11    glucagon (GLUCAGON EMERGENCY KIT, HUMAN,) 1 mg injection USE A DIRECTED 2 Vial 11    esomeprazole (NEXIUM) 40 mg capsule TAKE ONE CAPSULE BY MOUTH DAILY 30 Cap 8    Insulin Needles, Disposable, (BD ULTRA-FINE MINI PEN NEEDLE) 31 gauge x 3/16\" ndle Use as directed up to 8 times daily 200 Pen Needle 11    gabapentin (NEURONTIN) 300 mg capsule TAKE TWO CAPSULES BY MOUTH THREE TIMES A  Cap 11    atorvastatin (LIPITOR) 40 mg tablet Take 1 Tab by mouth daily. 90 Tab 3    levonorgestrel (MIRENA) 20 mcg/24 hr (5 years) IUD 1 Device by IntraUTERine route once.  ARIPiprazole (ABILIFY) 30 mg tablet Take 30 mg by mouth daily.  fentaNYL (DURAGESIC) 100 mcg/hr PATCH 1 Patch by TransDERmal route every seventy-two (72) hours.  umeclidinium-vilanterol (ANORO ELLIPTA) 62.5-25 mcg/actuation inhaler Take 1 Puff by inhalation daily. 1 Inhaler 0    insulin lispro (HUMALOG) 100 unit/mL injection sample 2 Vial 0    furosemide (LASIX) 20 mg tablet Take 40 mg by mouth daily. Patient takes 60 mg in the morning and 40 mg in the afternoon.  carvedilol (COREG) 25 mg tablet Take 6.25 mg by mouth daily.  furosemide (LASIX) 20 mg tablet Take 60 mg by mouth daily. Patient takes 60 mg in the morning and 40 mg in the afternoon.       FREESTYLE MARIELA SENSOR kit Use 1 sensor every 10 days as directed 3 Each 11     Allergies   Allergen Reactions    Zocor [Simvastatin] Myalgia    Lisinopril Cough     Family History   Problem Relation Age of Onset    Diabetes Mother         diet controlled    Diabetes Father         R foot amupation    Liver Disease Father     Heart Disease Paternal Uncle         MI in his 46s    Stroke Neg Hx      Social History     Tobacco Use    Smoking status: Current Some Day Smoker     Packs/day: 0.75     Years: 17.00     Pack years: 12.75     Types: Cigarettes    Smokeless tobacco: Current User    Tobacco comment: Trying to quit   Substance Use Topics    Alcohol use: Yes     Comment: a beer every few months     Patient Active Problem List   Diagnosis Code    Hyperlipidemia LDL goal <100 E78.5    Uncontrolled type I diabetes mellitus with neuropathy ([facility] E10.40, E10.65    Endometriosis N80.9    Abnormal weight gain R63.5    Elevated LFTs R94.5    Positive blood culture R78.81    Tobacco abuse Z72.0    Anxiety and depression F41.9, F32.9    DKA (diabetic ketoacidoses) ([facility]) E13.10    Aspiration pneumonia ([facility] J69.0    Essential hypertension I10    Diabetic nephropathy associated with type 1 diabetes mellitus ([facility]) E10.21    Severe obesity ([facility]) E66.01    COPD exacerbation ([facility] J44.1    Drug overdose T50.901A    Change in mental status R41.82    DEBORAH (acute kidney injury) ([facility] N17.9       Depression Risk Factor Screening:     3 most recent PHQ Screens 11/14/2017   PHQ Not Done Active Diagnosis of Depression or Bipolar Disorder     Alcohol Risk Factor Screening: You do not drink alcohol or very rarely. Functional Ability and Level of Safety:   Hearing Loss  Hearing is good. Activities of Daily Living  The home contains: no safety equipment. Patient does total self care    Fall Risk  No flowsheet data found. Abuse Screen  Patient is not abused. Lives alone. Cognitive Screening   Evaluation of Cognitive Function:  Has your family/caregiver stated any concerns about your memory: yes. 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   Normal    Patient Care Team   Patient Care Team:  Balaji Frances DO as PCP - General (Internal Medicine)  Tae Trevizo MD as Consulting Provider (Endocrinology)  Emma Sewell MD (Obstetrics & Gynecology)  Melani Florence MD (Sports Medicine)  Liseth Pittman MD (Nephrology)  Deepti Lopez MD (Gastroenterology)  Nadya Lindsay RN as Ambulatory Care Navigator    Assessment/Plan   Education and counseling provided:  Are appropriate based on today's review and evaluation  End-of-Life planning (with patient's consent)  Pneumococcal Vaccine  Colorectal cancer screening tests    Diagnoses and all orders for this visit:    1.  Medicare annual wellness visit, subsequent        Health Maintenance Due   Topic Date Due    COLONOSCOPY  02/28/2019    MICROALBUMIN Q1  04/24/2019    Influenza Age 5 to Adult  08/01/2019    MEDICARE YEARLY EXAM  08/24/2019

## 2019-08-30 NOTE — PROGRESS NOTES
NN attempted patient outreach today in order to perform LANE follow-up; lvm requesting a return phone call to this NN.     Has LANE appt scheduled with PCP today at 3:30 pm.    Future Appointments:  Future Appointments   Date Time Provider Wei Jean   8/30/2019  3:30 PM Wallace, [address]   9/17/2019  9:10 AM Fernando Aguirre MD [facility] [address]        Last Appointment With Me:  Visit date not found     Last Appointment My Department:  7/18/2019

## 2019-08-30 NOTE — PROGRESS NOTES
Reviewed record in preparation for visit and have obtained necessary documentation. Identified pt with two pt identifiers(name and ). Chief Complaint   Patient presents with   [facility] Follow Up       Health Maintenance Due   Topic Date Due    COLONOSCOPY  2019    MICROALBUMIN Q1  2019    Influenza Age 5 to Adult  2019    [address] [place] [address]  2019       Ms. Judy Ulrich has a reminder for a \"due or due soon\" health maintenance. I have asked that she discuss health maintenance topic(s) due with Her  primary care provider. Coordination of Care Questionnaire:  :     1) Have you been to an emergency room, urgent care clinic since your last visit? yes   Hospitalized since your last visit? no             2) Have you seen or consulted any other health care providers outside of [address] [place] [address] since your last visit? no  (Include any pap smears or colon screenings in this section.)    3) Do you have an Advance Directive on file? no    4) Are you interested in receiving information on Advance Directives? NO    Patient is accompanied by self I have received verbal consent from Clifford Bowden to discuss any/all medical information while they are present in the room.

## 2019-08-30 NOTE — TELEPHONE ENCOUNTER
Patient states she's returning your call. Patient states she will be here for her 3:30 appt today.  Thank you

## 2019-09-02 RX ORDER — FLUTICASONE PROPIONATE 50 MCG
SPRAY, SUSPENSION (ML) NASAL
Qty: 16 G | Refills: 3 | Status: SHIPPED | OUTPATIENT
Start: 2019-09-02 | End: 2020-09-10 | Stop reason: SDUPTHER

## 2019-09-05 ENCOUNTER — PATIENT OUTREACH (OUTPATIENT)
Dept: INTERNAL MEDICINE CLINIC | Age: 49
End: 2019-09-05

## 2019-09-05 NOTE — PROGRESS NOTES
Attended LANE appt with PCP 8/30/19 ; referral to sleep medicine ordered,  advised to f/u with PCP in 6 months.

## 2019-09-17 ENCOUNTER — TELEPHONE (OUTPATIENT)
Dept: ENDOCRINOLOGY | Age: 49
End: 2019-09-17

## 2019-09-17 ENCOUNTER — OFFICE VISIT (OUTPATIENT)
Dept: ENDOCRINOLOGY | Age: 49
End: 2019-09-17

## 2019-09-17 VITALS
BODY MASS INDEX: 35.52 KG/M2 | SYSTOLIC BLOOD PRESSURE: 155 MMHG | HEIGHT: 65 IN | DIASTOLIC BLOOD PRESSURE: 72 MMHG | HEART RATE: 69 BPM | WEIGHT: 213.2 LBS

## 2019-09-17 DIAGNOSIS — R79.89 ELEVATED LFTS: ICD-10-CM

## 2019-09-17 DIAGNOSIS — E78.5 HYPERLIPIDEMIA LDL GOAL <100: ICD-10-CM

## 2019-09-17 DIAGNOSIS — I10 ESSENTIAL HYPERTENSION: ICD-10-CM

## 2019-09-17 RX ORDER — LOSARTAN POTASSIUM 25 MG/1
25 TABLET ORAL DAILY
COMMUNITY
Start: 2019-09-17 | End: 2019-12-17

## 2019-09-17 NOTE — PROGRESS NOTES
Chief Complaint   Patient presents with    Diabetes     pcp and pharmacy confirmed    Other     patient checked her sugar on her machine and it was 494     History of Present Illness: Aguila Simmons is a 51 y.o. female here for follow up of diabetes. Weight up 4 lbs since last visit in 6/19. Current settings are as follows:  - basal: 12a: 1.45, 6a: 1.55 (was at 1.25 today), 10p: 1.4 (was at 1.2 today  - Carb ratio: 10  - sensitivity: 20  - target: 12a: 130-150, 6a: 100-130, 10p: 130-150  - active insulin time: 3 hours    It appears her basal rates at 6am and 10p were decreased sometime since last visit as she states she was having more low sugars when she was working out more so I resent them to where they were previously. Over the past week, has not been working out and having sugars consistently in the 300-400s or higher despite bolusing as directed. Has had some new vaginal spotting and contacted Dr. Philipp Dior about this. Due to see Dr. Elizabeth Rangel later today and has not been on any lasix aside from one dose 2 days ago. It's also unclear what her losartan dose is and how much she is taking as she states she hasn't taken any the past few days but is taking 6.25 mg of coreg daily. Still checking 8 times per day over the past 90 days.     she has the following indications to continue treatment with freestyle ellen:  1) she has type 1 diabetes and is on an intensive insulin regimen with an insulin pump  2) she tests her blood sugar 8 times per day and makes treatment decisions off her blood sugar readings and her sensor readings  3) she requires adjustments to her insulin pump setttings based on her sensor readings  4) she has benefitted from therapeutic continuous glucose monitoring and I recommend that she continue this  5) she is seen in my office every 3 months     Current Outpatient Medications   Medication Sig    fluticasone propionate (FLONASE) 50 mcg/actuation nasal spray SPRAY TWO SPRAYS IN EACH NOSTRIL ONCE DAILY    carvedilol (COREG) 6.25 mg tablet Take 1 Tab by mouth daily.  umeclidinium-vilanterol (ANORO ELLIPTA) 62.5-25 mcg/actuation inhaler Take 1 Puff by inhalation daily.  albuterol (PROVENTIL HFA, VENTOLIN HFA, PROAIR HFA) 90 mcg/actuation inhaler Take 1-2 Puffs by inhalation every four (4) hours as needed for Wheezing or Shortness of Breath.  insulin pump (PATIENT SUPPLIED) misc by SubCUTAneous route as needed.  HYDROcodone-acetaminophen (NORCO)  mg tablet Take 1 Tab by mouth.  LORazepam (ATIVAN) 2 mg tablet Take 2 mg by mouth four (4) times daily.  losartan (COZAAR) 25 mg tablet Take 6.25 mg by mouth daily.  citalopram (CELEXA) 20 mg tablet Take 40 mg by mouth daily.  traZODone (DESYREL) 50 mg tablet Take 100 mg by mouth nightly.  insulin lispro (HUMALOG) 100 unit/mL injection Use as directed for insulin pump. Max 100 units per day--BILL MEDICARE PART B--DX E10.65--REPLACES NOVOLOG    CONTOUR NEXT TEST STRIPS strip Use as directed to test up to 8 x per day,  Uncontrolled Type 1 diabetes with Neuropathy (HCC) E10.40, E10.65    Insulin Syringe-Needle U-100 0.5 mL 31 gauge x 5/16\" syrg Use as directed up to 4 times daily    glucagon (GLUCAGON EMERGENCY KIT, HUMAN,) 1 mg injection USE A DIRECTED    esomeprazole (NEXIUM) 40 mg capsule TAKE ONE CAPSULE BY MOUTH DAILY    Insulin Needles, Disposable, (BD ULTRA-FINE MINI PEN NEEDLE) 31 gauge x 3/16\" ndle Use as directed up to 8 times daily    gabapentin (NEURONTIN) 300 mg capsule TAKE TWO CAPSULES BY MOUTH THREE TIMES A DAY    atorvastatin (LIPITOR) 40 mg tablet Take 1 Tab by mouth daily.  levonorgestrel (MIRENA) 20 mcg/24 hr (5 years) IUD 1 Device by IntraUTERine route once.  ARIPiprazole (ABILIFY) 30 mg tablet Take 30 mg by mouth daily.  fentaNYL (DURAGESIC) 100 mcg/hr PATCH 1 Patch by TransDERmal route every seventy-two (72) hours.  furosemide (LASIX) 20 mg tablet Take 60 mg by mouth daily.  Patient takes 61 mg in the morning and 40 mg in the afternoon.  [employer]ingSTYLE MARIELA SENSOR kit Use 1 sensor every 10 days as directed     No current facility-administered medications for this visit. Allergies   Allergen Reactions    Zocor [Simvastatin] Myalgia    Lisinopril Cough     Review of Systems:  - Eyes: no blurry vision or double vision  - Cardiovascular: no chest pain  - Respiratory: no shortness of breath  - Musculoskeletal: no myalgias  - Neurological: no numbness/tingling in extremities    Physical Examination:  Blood pressure 155/72, pulse 69, height 5' 5\" (1.651 m), weight 213 lb 3.2 oz (96.7 kg). - General: pleasant, no distress, good eye contact   - Neck: no carotid bruits  - Cardiovascular: regular, normal rate, nl s1 and s2, no m/r/g,   - Respiratory: clear bilaterally  - Integumentary: 1+ non-pitting edema,   - Psychiatric: normal mood and affect    Diabetic foot exam:     Left Foot:   Visual Exam: callous - mild   Pulse DP: 2+ (normal)   Filament test: reduced sensation    Vibratory sensation: diminished      Right Foot:   Visual Exam: callous - mild   Pulse DP: 2+ (normal)   Filament test: reduced sensation    Vibratory sensation: diminished        Data Reviewed:   - none new for review    Assessment/Plan:     1) 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):  Most recent Hgb A1c was 9.3% in 7/19 down from 10.1% in 5/19 up from 10% in 1/19 up from 9.2% in 10/18 up from 9% in 7/18 up from 8.2% in 4/18 down from 8.8% in 11/17 up from 7.4% in 9/17 down from 9.4% in 2/17 up from 9.1% in 11/16 down from 10% in 3/16 up from 9.4% in 12/15 up from 9.2% in 9/15 down from 9.7% in 5/15 down from 10.3% in 1/15 up from 8.9% in 10/14 down from 11.1% in 4/14 up from 9.2% in 1/14 up from 8.6% in March 2013 down from 8.9% in November down from 9.7% in Aug 2012 up from 9.2% in October 2011 up from 8.7% in May down from 9.7% in March up from 9.1% in July 2010 down from 10.2% in January down from 13.7% in October 2009.  Her blood sugars are higher due to lowering her basal rate too much so reset this today. She has benefited from the freestyle Bennett and I recommend she continue this.  - go back to old pump settings  - check bs 8 times daily due to fluctuating blood sugars  - cont freestyle ellen CGM  - foot exam done 10/18  - microalbumin was 69 in 7/10 up to 392 in March 2011 and 621 in May down to 216 in October 2011 and 37.3 in 4/14 and up to 183 in 12/15 and 300 in 9/16, down to 85 in 4/18  - optho UTD 11/17  - check A1c and cmp and microalbumin today at Dr. Evelin Bourne office      2) HTN NOS (401.9): Blood pressure was above goal < 140/90 but not taking losartan currently and need to clarify her dose. - cont coreg 6.25 mg daily  - was previously on losartan 25 mg 1/4 tab daily  - off lasix as of today      3) Hyperlipidemia (272.4): Given DM, goal LDL is < 100, non-HDL < 130, and TGs < 150. LDL was 146 in January 2010, down to 124 in July and 93 in March 2011 with taking 5 mg of crestor daily. Her crestor was changed to pravastatin by Dr. Shawanda Yeboah because of cost in Feb 2012. LDL 93 in 8/12 but her HDL was high at 138 due to ETOH intake. LDL down to 39 in 11/12 but up to 102 in 3/13. Off pravastatin at this time and previously was on 10 mg daily.  in 1/14. Up to 167 in 4/14 so started lovastatin 20 mg daily but she couldn't afford this. She has been started on crestor 40 mg daily by crossover clinic and LDL 64 in 10/14 and 55 in 1/15. Was 37 in 3/16 so dose decreased to 20 mg daily and LDL 23 in 2/17. Was changed to lipitor 1/2 of 80 mg daily when clinic couldn't get crestor any longer and LDL 37 in 9/17. This was stopped during her hospital stay in 11/17 but was restarted by Dr. Adrian Nolan in 12/17 and 42 in 4/18. Up to 94 in 8/18.  Down to 79 in 1/19  - cont lipitor 40 mg daily  - check lipids today    4) Elevated LFTs (790.6): I told her previously that her LFTs were elevated likely due to ETOH intake so I advised her to cut back on this and her repeat LFTs were normal in 3/13 and 1/14 and 4/14 and 10/14 and 1/15 and 12/15 and 3/16. AST up to 76 in 2/17 but back to normal in 5/17 and 11/17 and 8/18 and 1/19 so will follow this. 5) Borderline abnormal TFT: TSH 2.1 in 12/11 but up to 4.01 in 11/12 and 4.75 in 11/17 during 2 hospital stays. Up to 5.45 in 1/19 with Dr. Tristan Gayle. Repeat TSH 1.24 and FT4 0.95 and TPO ab 21 in 2/19 so held on any treatment. - check TSH today    Patient Instructions   1) I reprogrammed your pump basal rates back to where they were last time to help with highs during the day. 2) Let me know what dose of losartan you have and if Dr. Dorita Medina wants to change any of you meds. 3) It's fine to use the temporary basal for exercise to avoid lows. 4) Take the lab slip I gave you today to Dr. Dorita Medina and she can combine this with whatever she needs.     Follow-up and Dispositions    · Return for 12/17/19 at 9:10am.               Copy sent to:  Dr. Dorita Gayle via [facility]  Dr. Dharmesh Giang    Addendum: 9/28/19  Received labs from Dr. Dorita Medina:  - Hgb A1c 10.6%  - lipids: total 160 ,  HDL 44, , LDL 88  - ALT 25, AST 24  - BUN/Cr 16/1.25  - TSH 3.36  - vitamin D 61  - microalbumin 10.2

## 2019-09-17 NOTE — TELEPHONE ENCOUNTER
Patient stated that the medication she takes is Losartan 25 mg 1 po every day . She stated that currently her sugar is 488 so she just took 20 units of Novolog.

## 2019-09-17 NOTE — TELEPHONE ENCOUNTER
----- Message from Jessica Hill sent at 9/17/2019 10:55 AM EDT -----  Regarding: Dr Berhane Vaz Message/    Caller's first and last name:      Reason for call:  Would like a return call back from Elizabeth Tavares, no further information was given when asked, said Elizabteh Tavares would [place] [facility] she was just seen today at the office.      Callback required yes/no and why:yes      Best contact number(s):[phone]      Details to clarify the request:      Jessica Hill

## 2019-09-17 NOTE — TELEPHONE ENCOUNTER
Let her know that hopefully the longer her basal rate runs, it will bring her sugar down and she should drink at least 32 oz of water today. It was fine to take the extra novolog now. We made a note of the dose of her losartan.

## 2019-09-17 NOTE — PATIENT INSTRUCTIONS
1) I reprogrammed your pump basal rates back to where they were last time to help with highs during the day. 2) Let me know what dose of losartan you have and if Dr. Clint Alcantar wants to change any of you meds. 3) It's fine to use the temporary basal for exercise to avoid lows. 4) Take the lab slip I gave you today to Dr. Clint Alcantar and she can combine this with whatever she needs.

## 2019-09-18 ENCOUNTER — PATIENT OUTREACH (OUTPATIENT)
Dept: INTERNAL MEDICINE CLINIC | Age: 49
End: 2019-09-18

## 2019-09-18 ENCOUNTER — HOSPITAL ENCOUNTER (EMERGENCY)
Age: 49
Discharge: HOME | End: 2019-09-18
Attending: EMERGENCY MEDICINE
Payer: MEDICARE

## 2019-09-18 ENCOUNTER — TELEPHONE (OUTPATIENT)
Dept: ENDOCRINOLOGY | Age: 49
End: 2019-09-18

## 2019-09-18 VITALS
OXYGEN SATURATION: 97 % | HEART RATE: 64 BPM | RESPIRATION RATE: 13 BRPM | DIASTOLIC BLOOD PRESSURE: 67 MMHG | SYSTOLIC BLOOD PRESSURE: 100 MMHG

## 2019-09-18 DIAGNOSIS — R73.9 HYPERGLYCEMIA: Primary | ICD-10-CM

## 2019-09-18 LAB
ALBUMIN SERPL-MCNC: 3.8 G/DL (ref 3.5–5)
ALBUMIN/GLOB SERPL: 1 {RATIO} (ref 1.1–2.2)
ALP SERPL-CCNC: 112 U/L (ref 45–117)
ALT SERPL-CCNC: 33 U/L (ref 12–78)
ANION GAP SERPL CALC-SCNC: 9 MMOL/L (ref 5–15)
APPEARANCE UR: CLEAR
AST SERPL-CCNC: 22 U/L (ref 15–37)
BACTERIA URNS QL MICRO: NEGATIVE /HPF
BASOPHILS # BLD: 0 K/UL (ref 0–0.1)
BASOPHILS NFR BLD: 1 % (ref 0–1)
BILIRUB SERPL-MCNC: 0.4 MG/DL (ref 0.2–1)
BILIRUB UR QL: NEGATIVE
BUN SERPL-MCNC: 26 MG/DL (ref 6–20)
BUN/CREAT SERPL: 16 (ref 12–20)
CALCIUM SERPL-MCNC: 8.6 MG/DL (ref 8.5–10.1)
CHLORIDE SERPL-SCNC: 98 MMOL/L (ref 97–108)
CO2 SERPL-SCNC: 27 MMOL/L (ref 21–32)
COLOR UR: ABNORMAL
CREAT SERPL-MCNC: 1.66 MG/DL (ref 0.55–1.02)
CREATININE, EXTERNAL: 1.25
DIFFERENTIAL METHOD BLD: ABNORMAL
EOSINOPHIL # BLD: 0 K/UL (ref 0–0.4)
EOSINOPHIL NFR BLD: 0 % (ref 0–7)
EPITH CASTS URNS QL MICRO: ABNORMAL /LPF
ERYTHROCYTE [DISTWIDTH] IN BLOOD BY AUTOMATED COUNT: 14.9 % (ref 11.5–14.5)
GLOBULIN SER CALC-MCNC: 3.8 G/DL (ref 2–4)
GLUCOSE BLD STRIP.AUTO-MCNC: 165 MG/DL (ref 65–100)
GLUCOSE BLD STRIP.AUTO-MCNC: 249 MG/DL (ref 65–100)
GLUCOSE BLD STRIP.AUTO-MCNC: 334 MG/DL (ref 65–100)
GLUCOSE SERPL-MCNC: 335 MG/DL (ref 65–100)
GLUCOSE UR STRIP.AUTO-MCNC: >1000 MG/DL
HBA1C MFR BLD HPLC: 10.6 %
HCT VFR BLD AUTO: 37.7 % (ref 35–47)
HGB BLD-MCNC: 12.4 G/DL (ref 11.5–16)
HGB UR QL STRIP: NEGATIVE
HYALINE CASTS URNS QL MICRO: ABNORMAL /LPF (ref 0–5)
IMM GRANULOCYTES # BLD AUTO: 0 K/UL (ref 0–0.04)
IMM GRANULOCYTES NFR BLD AUTO: 0 % (ref 0–0.5)
KETONES UR QL STRIP.AUTO: NEGATIVE MG/DL
LDL-C, EXTERNAL: 88
LEUKOCYTE ESTERASE UR QL STRIP.AUTO: NEGATIVE
LYMPHOCYTES # BLD: 2.5 K/UL (ref 0.8–3.5)
LYMPHOCYTES NFR BLD: 30 % (ref 12–49)
MCH RBC QN AUTO: 29.7 PG (ref 26–34)
MCHC RBC AUTO-ENTMCNC: 32.9 G/DL (ref 30–36.5)
MCV RBC AUTO: 90.4 FL (ref 80–99)
MICROALBUMIN UR TEST STR-MCNC: 10.2 MG/DL
MONOCYTES # BLD: 0.6 K/UL (ref 0–1)
MONOCYTES NFR BLD: 7 % (ref 5–13)
NEUTS SEG # BLD: 5.3 K/UL (ref 1.8–8)
NEUTS SEG NFR BLD: 62 % (ref 32–75)
NITRITE UR QL STRIP.AUTO: NEGATIVE
NRBC # BLD: 0 K/UL (ref 0–0.01)
NRBC BLD-RTO: 0 PER 100 WBC
PH UR STRIP: 5 [PH] (ref 5–8)
PLATELET # BLD AUTO: 347 K/UL (ref 150–400)
PMV BLD AUTO: 9.7 FL (ref 8.9–12.9)
POTASSIUM SERPL-SCNC: 4.3 MMOL/L (ref 3.5–5.1)
PROT SERPL-MCNC: 7.6 G/DL (ref 6.4–8.2)
PROT UR STRIP-MCNC: NEGATIVE MG/DL
RBC # BLD AUTO: 4.17 M/UL (ref 3.8–5.2)
RBC #/AREA URNS HPF: ABNORMAL /HPF (ref 0–5)
SERVICE CMNT-IMP: ABNORMAL
SODIUM SERPL-SCNC: 134 MMOL/L (ref 136–145)
SP GR UR REFRACTOMETRY: 1.01 (ref 1–1.03)
UA: UC IF INDICATED,UAUC: ABNORMAL
UROBILINOGEN UR QL STRIP.AUTO: 0.2 EU/DL (ref 0.2–1)
WBC # BLD AUTO: 8.5 K/UL (ref 3.6–11)
WBC URNS QL MICRO: ABNORMAL /HPF (ref 0–4)

## 2019-09-18 PROCEDURE — 80053 COMPREHEN METABOLIC PANEL: CPT

## 2019-09-18 PROCEDURE — 85025 COMPLETE CBC W/AUTO DIFF WBC: CPT

## 2019-09-18 PROCEDURE — 36415 COLL VENOUS BLD VENIPUNCTURE: CPT

## 2019-09-18 PROCEDURE — 74011250636 HC RX REV CODE- 250/636: Performed by: EMERGENCY MEDICINE

## 2019-09-18 PROCEDURE — 81001 URINALYSIS AUTO W/SCOPE: CPT

## 2019-09-18 PROCEDURE — 82962 GLUCOSE BLOOD TEST: CPT

## 2019-09-18 PROCEDURE — 99284 EMERGENCY DEPT VISIT MOD MDM: CPT

## 2019-09-18 RX ADMIN — SODIUM CHLORIDE 1000 ML: 900 INJECTION, SOLUTION INTRAVENOUS at 13:05

## 2019-09-18 NOTE — DISCHARGE INSTRUCTIONS
Patient Education        Learning About High Blood Sugar  What is high blood sugar? Your body turns the food you eat into glucose (sugar), which it uses for energy. But if your body isn't able to use the sugar right away, it can build up in your blood and lead to high blood sugar. When the amount of sugar in your blood stays too high for too much of the time, you may have diabetes. Diabetes is a disease that can cause serious health problems. The good news is that lifestyle changes may help you get your blood sugar back to normal and avoid or delay diabetes. What causes high blood sugar? Sugar (glucose) can build up in your blood if you:  · Are overweight. · Have a family history of diabetes. · Take certain medicines, such as steroids. What are the symptoms? Having high blood sugar may not cause any symptoms at all. Or it may make you feel very thirsty or very hungry. You may also urinate more often than usual, have blurry vision, or lose weight without trying. How is high blood sugar treated? You can take steps to lower your blood sugar level if you understand what makes it get higher. Your doctor may want you to learn how to test your blood sugar level at home. Then you can see how illness, stress, or different kinds of food or medicine raise or lower your blood sugar level. Other tests may be needed to see if you have diabetes. How can you prevent high blood sugar? · Watch your weight. If you're overweight, losing just a small amount of weight may help. Reducing fat around your waist is most important. · Limit the amount of calories, sweets, and unhealthy fat you eat. Ask your doctor if a dietitian can help you. A registered dietitian can help you create meal plans that fit your lifestyle. · Get at least 30 minutes of exercise on most days of the week. Exercise helps control your blood sugar. It also helps you maintain a healthy weight. Walking is a good choice.  You also may want to do other activities, such as running, swimming, cycling, or playing tennis or team sports. · If your doctor prescribed medicines, take them exactly as prescribed. Call your doctor if you think you are having a problem with your medicine. You will get more details on the specific medicines your doctor prescribes. Follow-up care is a key part of your treatment and safety. Be sure to make and go to all appointments, and call your doctor if you are having problems. It's also a good idea to know your test results and keep a list of the medicines you take. Where can you learn more? Go to http://mac-rufus.info/. Enter O108 in the search box to learn more about \"Learning About High Blood Sugar. \"  Current as of: July 25, 2018  Content Version: 12.1  © 0338-6409 [employer]. Care instructions adapted under license by [employer] (which disclaims liability or warranty for this information). If you have questions about a medical condition or this instruction, always ask your healthcare professional. [address] any warranty or liability for your use of this information.

## 2019-09-18 NOTE — TELEPHONE ENCOUNTER
I called patient x 1 with no answer. I informed Dr. Brita Bence of the elvated sugar and per him patient is to go to ER. I called patient back and informed her per Dr. Brita Bence that she needed to go to.the  ER. Patient voiced understanding and stated she will be going to [facility].

## 2019-09-18 NOTE — PROGRESS NOTES
Goals Addressed                 This Visit's Progress       Post Hospitalization     Attends follow-up appointments as directed. 9/18/2019  - Attended LANE appt with PCP 8/30/19 ; referral to sleep medicine ordered,  advised to f/u with PCP in 6 months. - office visit with Dr. Marine Grimes (Endocrinology) 9/17/19; insulin pump reprogrammed to old setting by Dr. Marine Grimes to help with blood sugar highs during the day, check bs 8 times daily due to fluctuating blood sugars, f/u with Dr. Marine Grimes 12/17/19       Prevent complications post hospitalization. 7/17/19  Patient reports [facility] post-acute visit completed on 7/16/19 7/18/2019  - Notified patient of scheduled LANE appt with PCP for today; patient will attend appointment. 7/24/2019  - attended LANE appt with PCP 7/18/19; new order for anoro ellipta, nicotine patch, f/u with PCP in 6 months for routine care. Was also seen by Ambulatory PharmD during office visit and education on proper use of anoro ellipta. 7/26/2019  - per [facility], patient attended office visit with Dr. Brigido Cheng on 7/22/19; no future f/u scheduled at this time. Patient to f/u in 4 months. - need to f/u with patient regarding outpatient nephrology follow-up; per IP Team notes, patient was scheduled to f/u with OP Nephrology on 7/12/19, however NN was unable to find documentation of OP Nephrology Provider that follows patient. 8/13/2019  - To the best of this NN's knowledge, this patient had no additional Inpatient Hospital Admissions during 30 day LANE period following [id number].   - LANE period has ended. Goal Completed.     9/18/2019  - [id number]  - NN has been unable to reach patient post-discharge despite multiple outreach attempts  - unidentified male individual answered the telephone during NN patient outreach today (individual states, \"I'm just a technician that happened to be here\") and informed NN that patient is currently being evaluated by EMS for transport to ED due to high blood sugar >600 mg/dL, phone call was then disconnected on other end of the line                Future Appointments:  Future Appointments   Date Time Provider Wei Miranda   12/17/2019  9:10 AM Odessa Garcia MD [facility] [address] With Me:  Visit date not found     Last Appointment My Department:  8/30/2019

## 2019-09-18 NOTE — ED NOTES
Dr Mayer Paget reviewed discharge instructions with the patient. The patient verbalized understanding. Patient discharged home in stable condition, ambulatory with family. Patient discharged with discharge papers and prescriptions in hand. Patient awake and alert, stable gait.

## 2019-09-18 NOTE — ED PROVIDER NOTES
EMERGENCY DEPARTMENT HISTORY AND PHYSICAL EXAM      Date: 9/18/2019  Patient Name: Alix Sandhoff    History of Presenting Illness     Chief Complaint   Patient presents with    High Blood Sugar     Arrives via EMS for BS > 600 per home meter EMS got 428 Pt states she gave herself 25 units with new Insulin Pump site and 30 units Novolog SQ prior to EMS arrival Pt states she has been having BS issues x 4 days Saw Endocrinology yesterday who \"tweeked\" her Insulin Pump       History Provided By: Patient    HPI: Alix Sandhoff, 51 y.o. female with PMHx as noted below presents the emergency department with chief complaint of hyperglycemia. Patient notes that she woke up this morning and her glucometer was reading greater than 600. When EMS arrived there fingerstick glucose was 428. Patient does note that since then she changed her insulin pump site and had given herself 25 units through the pump as well as 30 units of NovoLog subq. patient does note that she has had very difficult to control blood sugars have been worse lately. She was seen by her endocrinologist yesterday. Otherwise she has no complaints at this time and feels fine. She has had no chest pain, cough, shortness of breath, fevers, runny nose, sore throat, abdominal pain, dysuria, nausea, vomiting. PCP: Catherine Cristobal, DO    Current Outpatient Medications   Medication Sig Dispense Refill    losartan (COZAAR) 25 mg tablet Take 1 Tab by mouth daily.  fluticasone propionate (FLONASE) 50 mcg/actuation nasal spray SPRAY TWO SPRAYS IN EACH NOSTRIL ONCE DAILY 16 g 3    carvedilol (COREG) 6.25 mg tablet Take 1 Tab by mouth daily. 90 Tab 3    umeclidinium-vilanterol (ANORO ELLIPTA) 62.5-25 mcg/actuation inhaler Take 1 Puff by inhalation daily. 1 Inhaler 11    albuterol (PROVENTIL HFA, VENTOLIN HFA, PROAIR HFA) 90 mcg/actuation inhaler Take 1-2 Puffs by inhalation every four (4) hours as needed for Wheezing or Shortness of Breath. 1 Inhaler 0    insulin pump (PATIENT SUPPLIED) [facility] by SubCUTAneous route as needed.  HYDROcodone-acetaminophen (NORCO)  mg tablet Take 1 Tab by mouth.  LORazepam (ATIVAN) 2 mg tablet Take 2 mg by mouth four (4) times daily.  citalopram (CELEXA) 20 mg tablet Take 40 mg by mouth daily.  traZODone (DESYREL) 50 mg tablet Take 100 mg by mouth nightly.  insulin lispro (HUMALOG) 100 unit/mL injection Use as directed for insulin pump. Max 100 units per day--BILL MEDICARE PART B--DX E10.65--REPLACES NOVOLOG 3 Vial 11    CONTOUR NEXT TEST STRIPS strip Use as directed to test up to 8 x per day,  Uncontrolled Type 1 diabetes with Neuropathy (HCC) E10.40, E10.65 250 Strip 11    Insulin Syringe-Needle U-100 0.5 mL 31 gauge x 5/16\" syrg Use as directed up to 4 times daily 100 Syringe 11    glucagon (GLUCAGON EMERGENCY KIT, HUMAN,) 1 mg injection USE A DIRECTED 2 Vial 11    esomeprazole (NEXIUM) 40 mg capsule TAKE ONE CAPSULE BY MOUTH DAILY 30 Cap 8    Insulin Needles, Disposable, (BD ULTRA-FINE MINI PEN NEEDLE) 31 gauge x 3/16\" ndle Use as directed up to 8 times daily 200 Pen Needle 11    gabapentin (NEURONTIN) 300 mg capsule TAKE TWO CAPSULES BY MOUTH THREE TIMES A  Cap 11    atorvastatin (LIPITOR) 40 mg tablet Take 1 Tab by mouth daily. 90 Tab 3    levonorgestrel (MIRENA) 20 mcg/24 hr (5 years) IUD 1 Device by IntraUTERine route once.  ARIPiprazole (ABILIFY) 30 mg tablet Take 30 mg by mouth daily.  fentaNYL (DURAGESIC) 100 mcg/hr PATCH 1 Patch by TransDERmal route every seventy-two (72) hours.       FREESTYLE MARIELA SENSOR kit Use 1 sensor every 10 days as directed 3 Each 11       Past History     Past Medical History:  Past Medical History:   Diagnosis Date    Achilles tendon rupture     along with torn right patellar/femoral ligament    Arthritis     rt. foot    Chronic kidney disease     Chronic pain     abdominal pain    Diabetes (HCC)     IDDM on insulin pump and sensor    DM type 1 (diabetes mellitus, type 1) ([facility] [address]     age 25    Endometriosis     s/p ex-lap 4x    Gastric ulcer     GERD (gastroesophageal reflux disease)     Herpes     Other and unspecified hyperlipidemia     Panic attacks     Psychiatric disorder     anxiety, panic attacks    PTSD (post-traumatic stress disorder)     Unspecified essential hypertension        Past Surgical History:  Past Surgical History:   Procedure Laterality Date    HX COLONOSCOPY      HX GYN      2 d&c's    HX GYN      laparoscopies x5 for endometriosis    HX GYN      mirena in place    HX ORTHOPAEDIC  2002    achiles tendon repair,talus repair    HX ORTHOPAEDIC      injections x2 to right shoulder    HX ORTHOPAEDIC Left 02/07/2019    3rd trigger finger release       Family History:  Family History   Problem Relation Age of Onset    Diabetes Mother         diet controlled    Diabetes Father         R foot amupation    Liver Disease Father     Heart Disease Paternal Uncle         MI in his 46s    Stroke Neg Hx        Social History:  Social History     Tobacco Use    Smoking status: Current Some Day Smoker     Packs/day: 0.75     Years: 17.00     Pack years: 12.75     Types: Cigarettes    Smokeless tobacco: Current User    Tobacco comment: Trying to quit   Substance Use Topics    Alcohol use: Yes     Comment: a beer every few months    Drug use: No     Types: OTC, Prescription       Allergies: Allergies   Allergen Reactions    Zocor [Simvastatin] Myalgia    Lisinopril Cough         Review of Systems   Review of Systems  Constitutional: Negative for fever, chills, and fatigue. HENT: Negative for congestion, sore throat, rhinorrhea, sneezing and neck stiffness   Eyes: Negative for discharge and redness.    Respiratory: Negative for  shortness of breath, wheezing   Cardiovascular: Negative for chest pain, palpitations   Gastrointestinal: Negative for nausea, vomiting, abdominal pain, constipation, diarrhea and blood in stool. Genitourinary: Negative for dysuria, hematuria, flank pain, decreased urine volume, discharge,   Musculoskeletal: Negative for myalgias or joint pain . Skin: Negative for rash or lesions . Neurological: Negative weakness, light-headedness, numbness and headaches. Physical Exam   Physical Exam    GENERAL: alert and oriented, no acute distress  EYES: PEERL, No injection, discharge or icterus. ENT: Mucous membranes pink and moist.  NECK: Supple  LUNGS: Airway patent. Non-labored respirations. Breath sounds clear with good air entry bilaterally. HEART: Regular rate and rhythm. No peripheral edema  ABDOMEN: Non-distended and non-tender, without guarding or rebound. SKIN:  warm, dry  MSK/EXTREMITIES: Without swelling, tenderness or deformity, symmetric with normal ROM  NEUROLOGICAL: Alert, oriented      Diagnostic Study Results     Labs -  Reviewed    Radiologic Studies -   No orders to display     CT Results  (Last 48 hours)    None        CXR Results  (Last 48 hours)    None            Medical Decision Making   I am the first provider for this patient. I reviewed the vital signs, available nursing notes, past medical history, past surgical history, family history and social history. Vital Signs-Reviewed the patient's vital signs. Records Reviewed: Nursing Notes and Old Medical Records    Provider Notes (Medical Decision Making): On presentation, the patient is well appearing, in no acute distress with normal vital signs. Based on my history and exam the differential diagnosis for this patient includes hyperglycemia, DKA, HHS, dehydration, infection, ACS. Work-up was reassuring in the emergency department and the patient has remained asymptomatic. Blood sugars appear to have normalized on several repeat.   There is nothing in her history or exam that would suggest an etiology for her hyperglycemia, wonder if may be secondary to pump misplacement seeing as her blood glucose seems to have normalized after changing the pump site. Patient is stable for discharge at this time but should discuss this with her endocrinologist before the end of the week. Patient is in agreement with this plan and is ready for discharge. ED Course:   Initial assessment performed. The patients presenting problems have been discussed, and they are in agreement with the care plan formulated and outlined with them. I have encouraged them to ask questions as they arise throughout their visit. PROGRESS NOTE:  The patient has been re-evaluated and is ready for discharge. Reviewed available results with patient and have counseled them on diagnosis and care plan. They have expressed understanding, and all their questions have been answered. They agree with plan and agree to have pt F/U as recommended, or return to the ED if their sxs worsen. Discharge instructions have been provided and explained to them, along with reasons to have pt return to the ED. The patient is amenable to discharge so will discharge pt at this time    Eitan Cobos MD        Disposition:  home    PLAN:  1. Discharge Medication List as of 9/18/2019  2:26 PM        2. Follow-up Information     Follow up With Specialties Details Why Silvio Pride DO Internal Medicine Schedule an appointment as soon as possible for a visit in 2 days  [address] [facility]  [phone]      Call your endocrinologist tomorrow for further recommendations            Return to ED if worse     Diagnosis     Clinical Impression:   1.  Hyperglycemia

## 2019-09-19 ENCOUNTER — TELEPHONE (OUTPATIENT)
Dept: ENDOCRINOLOGY | Age: 49
End: 2019-09-19

## 2019-09-19 NOTE — TELEPHONE ENCOUNTER
Pt notified of message per Dr. Michell Tyson and voiced understanding of what was read to her. She stated that she will check her sugar with a different batch and will let us know.

## 2019-09-19 NOTE — TELEPHONE ENCOUNTER
Pt notified of message per Dr. Mateo Mitchell and voiced understanding of what was read to her.  She wanted Dr Mateo Mitchell to know that she did increease her bas

## 2019-09-19 NOTE — TELEPHONE ENCOUNTER
9/19/2019  11:12 AM        Ms. Anne Ramos called stating that she was released this morning from the hospital , please call when you have a moment.           Thanks

## 2019-09-19 NOTE — TELEPHONE ENCOUNTER
Do you mean 0.1 units/hr as 1 unit/hr is a very large increase? I still think the temp basal of 130% will be a good plan for the next 24 hours and she can give us an update tomorrow if this has not brought her sugars back down under 200.

## 2019-09-19 NOTE — TELEPHONE ENCOUNTER
Pt notified of message per Dr. Mg Fernandez and voiced understanding of what was read to her.  She also stated that at 6 am this morning she did increase each basal rate by 1 unit. (FYI)

## 2019-09-19 NOTE — TELEPHONE ENCOUNTER
Is she sure her strips are accurate as she has this happen one other time when her meter was reading high and it turns out her strips were bad as her sugar was down under 200 by the time she was discharged yesterday?

## 2019-09-19 NOTE — TELEPHONE ENCOUNTER
I spoke with patient and she stated that her sugar this morning fasting was 598. Patient has changed her site x 2 and currently her blood sugar is 392. She has drank 6 bottles of 16 oz  Water. Please advise.

## 2019-09-19 NOTE — TELEPHONE ENCOUNTER
I don't know her sugar is running so high as she was seen in the ER and did not have an infection. I recommend running a temporary basal of 130% for the next 24 hours to see if this brings down her sugar and continue to drink plenty of water.

## 2019-10-10 ENCOUNTER — PATIENT OUTREACH (OUTPATIENT)
Dept: INTERNAL MEDICINE CLINIC | Age: 49
End: 2019-10-10

## 2019-10-10 NOTE — PROGRESS NOTES
Patient has graduated from the Transitions of Care Coordination  program due to NN inability to reach patient. Goals Addressed                 This Visit's Progress       Post Hospitalization     COMPLETED: Attends follow-up appointments as directed. 9/18/2019  - Attended LANE appt with PCP 8/30/19 ; referral to sleep medicine ordered,  advised to f/u with PCP in 6 months. - office visit with Dr. Yahaira Gill (Endocrinology) 9/17/19; insulin pump reprogrammed to old setting by Dr. Yahaira Gill to help with blood sugar highs during the day, check bs 8 times daily due to fluctuating blood sugars, f/u with Dr. Yahaira Gill 12/17/19       COMPLETED: Prevent complications post hospitalization. 7/17/19  Patient reports [facility] post-acute visit completed on 7/16/19 7/18/2019  - Notified patient of scheduled LANE appt with PCP for today; patient will attend appointment. 7/24/2019  - attended LANE appt with PCP 7/18/19; new order for anoro ellipta, nicotine patch, f/u with PCP in 6 months for routine care. Was also seen by Ambulatory PharmD during office visit and education on proper use of anoro ellipta. 7/26/2019  - per [facility], patient attended office visit with Dr. Micki Dickey on 7/22/19; no future f/u scheduled at this time. Patient to f/u in 4 months. - need to f/u with patient regarding outpatient nephrology follow-up; per IP Team notes, patient was scheduled to f/u with OP Nephrology on 7/12/19, however NN was unable to find documentation of OP Nephrology Provider that follows patient. 8/13/2019  - To the best of this NN's knowledge, this patient had no additional Inpatient Hospital Admissions during 30 day LANE period following admission to [facility] 7/10/19-7/12/19.   - LANE period has ended. Goal Completed.     9/18/2019  - pt was admitted to [facility] 8/21-8/23  - NN has been unable to reach patient post-discharge despite multiple outreach attempts  - unidentified male individual answered the telephone during NN patient outreach today (individual states, \"I'm just a technician that happened to be here\") and informed NN that patient is currently being evaluated by EMS for transport to ED due to high blood sugar >600 mg/dL, phone call was then disconnected on other end of the line    10/10/2019  - [facility] Visit 9/18/19 is noted for c/o high blood sugar. ED Diagnosis of hyperglycemia; per ED documentation, patient blood sugar upon EMS arrival 428 mg/dL, blood glucose at ED arrival 334 m/dL and ED discharge 165 mg/dL; insulin pump site changed (? During ED visit), no new medication orders/changes noted. Patient discharged to home and advised to f/u with Endocrinologist and PCP  - no office visit ED f/u with PCP and/or Endocrinologist is noted post-ED visit; endocrinology telephone encounter dated 9/19/19 is noted (see encounter for details)  - NN unable to reach patient today  - Episode resolved at this time due to NN inability to reach patient (see previous NN outreach encounters for pt outreach attempt history)  - To the best of this NN's knowledge, this patient had no additional Inpatient Hospital Admissions during 30 day LANE period following [facility].   - LANE period has ended. Goal Completed.                     Future Appointments:  Future Appointments   Date Time Provider Wei Jean   12/17/2019  9:10 AM Tiana Ashley MD [facility] [address]        Last Appointment With Me:  Visit date not found     Last Appointment My Department:  8/30/2019

## 2019-10-14 RX ORDER — GABAPENTIN 300 MG/1
CAPSULE ORAL
Qty: 180 CAP | Refills: 5 | Status: SHIPPED | OUTPATIENT
Start: 2019-10-14 | End: 2020-01-17

## 2019-10-15 ENCOUNTER — PATIENT OUTREACH (OUTPATIENT)
Dept: INTERNAL MEDICINE CLINIC | Age: 49
End: 2019-10-15

## 2019-10-15 NOTE — PROGRESS NOTES
Pt was referred for diabetes self management education in July and did not schedule an appt. Left message today at [phone] asking pt to call Kay  back at [phone] to schedule an appt for diabetes self management education. Also left message at [phone]. Pt has several phone numbers listed, some of which are not in service or pt not available. It is noted that pt sees Dr. Seferino Perez for diabetes on a regular basis.        Future Appointments   Date Time Provider Wei Jean   12/17/2019  9:10 AM Timothy Painter MD [facility] [address] [facility]      Last Appointment My Department:  8/30/2019

## 2019-10-28 ENCOUNTER — TELEPHONE (OUTPATIENT)
Dept: INTERNAL MEDICINE CLINIC | Age: 49
End: 2019-10-28

## 2019-10-28 NOTE — TELEPHONE ENCOUNTER
----- Message from Natali Garcia sent at 10/28/2019 12:34 PM EDT -----  Regarding: Dr. Conner Comes telephone   Appointment not available    Caller's first and last name and relationship to patient (if not the patient):      Best contact number:  [phone]    Preferred date and time:    asap   Scheduled appointment date and time:      Reason for appointment:  Cold and bronchial     Details to clarify the request:      Zahida Black      Copy/paste envera

## 2019-10-28 NOTE — TELEPHONE ENCOUNTER
Called, spoke to pt. Two identifiers confirmed. Appointment scheduled for 11/1 @ [phone] with Dr. Leo Clemons. Pt verbalized understanding of information discussed w/ no further questions at this time.

## 2019-11-01 ENCOUNTER — OFFICE VISIT (OUTPATIENT)
Dept: INTERNAL MEDICINE CLINIC | Age: 49
End: 2019-11-01

## 2019-11-01 VITALS
SYSTOLIC BLOOD PRESSURE: 139 MMHG | RESPIRATION RATE: 14 BRPM | OXYGEN SATURATION: 95 % | HEIGHT: 65 IN | WEIGHT: 233.2 LBS | HEART RATE: 80 BPM | BODY MASS INDEX: 38.85 KG/M2 | DIASTOLIC BLOOD PRESSURE: 61 MMHG | TEMPERATURE: 99 F

## 2019-11-01 DIAGNOSIS — R06.2 WHEEZING: Primary | ICD-10-CM

## 2019-11-01 DIAGNOSIS — J20.9 ACUTE BRONCHITIS, UNSPECIFIED ORGANISM: Primary | ICD-10-CM

## 2019-11-01 DIAGNOSIS — J20.9 ACUTE BRONCHITIS, UNSPECIFIED ORGANISM: ICD-10-CM

## 2019-11-01 RX ORDER — NEBULIZER AND COMPRESSOR
EACH MISCELLANEOUS
Qty: 1 EACH | Refills: 0 | Status: SHIPPED | OUTPATIENT
Start: 2019-11-01 | End: 2020-10-06

## 2019-11-01 RX ORDER — IPRATROPIUM BROMIDE AND ALBUTEROL SULFATE 2.5; .5 MG/3ML; MG/3ML
3 SOLUTION RESPIRATORY (INHALATION)
Qty: 30 NEBULE | Refills: 1 | Status: SHIPPED | OUTPATIENT
Start: 2019-11-01 | End: 2020-10-06

## 2019-11-01 RX ORDER — CODEINE PHOSPHATE AND GUAIFENESIN 10; 100 MG/5ML; MG/5ML
5 SOLUTION ORAL
Qty: 118 ML | Refills: 1 | Status: SHIPPED | OUTPATIENT
Start: 2019-11-01 | End: 2019-11-04

## 2019-11-01 RX ORDER — DOXYCYCLINE 100 MG/1
100 CAPSULE ORAL 2 TIMES DAILY
Qty: 14 CAP | Refills: 0 | Status: SHIPPED | OUTPATIENT
Start: 2019-11-01 | End: 2019-11-08

## 2019-11-01 NOTE — PROGRESS NOTES
1. Have you been to the ER, urgent care clinic since your last visit? Hospitalized since your last visit?no  2. Have you seen or consulted any other health care providers outside of [facility] since your last visit? Include any pap smears or colon screening.  no

## 2019-11-01 NOTE — PROGRESS NOTES
SUBJECTIVE  Ms. Diya Becker presents today acutely for     Chief Complaint   Patient presents with    URI     pt here today c.o wheezing, productive cough, chest tightness x 3 days      Sometimes shc brings up green \"glue-like\" sputum. Sick for 3-4 days. She has an albuterol inhaler at home. OBJECTIVE  Visit Vitals  /61 (BP 1 Location: Left arm, BP Patient Position: Sitting)   Pulse 80   Temp 99 °F (37.2 °C) (Oral)   Resp 14   Ht 5' 5\" (1.651 m)   Wt 233 lb 3.2 oz (105.8 kg)   SpO2 95%   BMI 38.81 kg/m²     Gen: Pleasant 51 y.o.  female in NAD.   HEENT: PERRLA. EOMI. OP moist and pink.  Neck: Supple.  No LAD.  HEART: RRR, No M/G/R.   LUNGS: Coughs frequently. Exp wheezes noted.   ABDOMEN: S, NT, ND, BS+.   EXTREMITIES: Warm. No C/C/E.     ASSESSMENT / PLAN    ICD-10-CM ICD-9-CM    1. Wheezing R06.2 786.07 Nebulizer & Compressor machine      albuterol-ipratropium (DUO-NEB) 2.5 mg-0.5 mg/3 ml nebu   2. Acute bronchitis, unspecified organism J20.9 466.0 guaiFENesin-codeine (ROBAFEN AC) 100-10 mg/5 mL solution      doxycycline (VIBRAMYCIN) 100 mg capsule      XR CHEST PA LAT       I have reviewed with the patient details of the assessment and plan and all questions were answered. Relevant patient education was performed. Follow-up and Dispositions    · Return if symptoms worsen or fail to improve.

## 2019-11-01 NOTE — PATIENT INSTRUCTIONS
Office Policies    Phone calls/patient messages:            Please allow up to 24 hours for someone in the office to contact you about your call or message. Be mindful your provider may be out of the office or your message may require further review. We encourage you to use [employer] for your messages as this is a faster, more efficient way to communicate with our office                         Medication Refills:            Prescription medications require 48-72 business hours to process. We encourage you to use [employer] for your refills. For controlled medications: Please allow 72 business hours to process. Certain medications may require you to  a written prescription at our office. NO narcotic/controlled medications will be prescribed after 4pm Monday through Friday or on weekends              Form/Paperwork Completion:            Please note a $25 fee may incur for all paperwork for completed by our providers. We ask that you allow 7-10 business days. Pre-payment is due prior to picking up/faxing the completed form. You may also download your forms to [employer] to have your doctor print off.

## 2019-11-11 ENCOUNTER — APPOINTMENT (OUTPATIENT)
Dept: GENERAL RADIOLOGY | Age: 49
End: 2019-11-11
Attending: EMERGENCY MEDICINE
Payer: MEDICARE

## 2019-11-11 ENCOUNTER — HOSPITAL ENCOUNTER (EMERGENCY)
Age: 49
Discharge: HOME | End: 2019-11-11
Attending: EMERGENCY MEDICINE
Payer: MEDICARE

## 2019-11-11 VITALS
WEIGHT: 230 LBS | OXYGEN SATURATION: 96 % | SYSTOLIC BLOOD PRESSURE: 112 MMHG | DIASTOLIC BLOOD PRESSURE: 76 MMHG | HEIGHT: 65 IN | TEMPERATURE: 98.4 F | RESPIRATION RATE: 19 BRPM | BODY MASS INDEX: 38.32 KG/M2 | HEART RATE: 71 BPM

## 2019-11-11 DIAGNOSIS — R06.02 SOB (SHORTNESS OF BREATH): ICD-10-CM

## 2019-11-11 DIAGNOSIS — J81.0 ACUTE PULMONARY EDEMA (HCC): Primary | ICD-10-CM

## 2019-11-11 LAB
ALBUMIN SERPL-MCNC: 3.5 G/DL (ref 3.5–5)
ALBUMIN/GLOB SERPL: 1.1 {RATIO} (ref 1.1–2.2)
ALP SERPL-CCNC: 84 U/L (ref 45–117)
ALT SERPL-CCNC: 21 U/L (ref 12–78)
ANION GAP SERPL CALC-SCNC: 4 MMOL/L (ref 5–15)
AST SERPL-CCNC: 17 U/L (ref 15–37)
BASOPHILS # BLD: 0 K/UL (ref 0–0.1)
BASOPHILS NFR BLD: 0 % (ref 0–1)
BILIRUB SERPL-MCNC: 0.3 MG/DL (ref 0.2–1)
BNP SERPL-MCNC: 70 PG/ML
BUN SERPL-MCNC: 24 MG/DL (ref 6–20)
BUN/CREAT SERPL: 18 (ref 12–20)
CALCIUM SERPL-MCNC: 8.6 MG/DL (ref 8.5–10.1)
CHLORIDE SERPL-SCNC: 99 MMOL/L (ref 97–108)
CO2 SERPL-SCNC: 32 MMOL/L (ref 21–32)
CREAT SERPL-MCNC: 1.3 MG/DL (ref 0.55–1.02)
DIFFERENTIAL METHOD BLD: NORMAL
EOSINOPHIL # BLD: 0.2 K/UL (ref 0–0.4)
EOSINOPHIL NFR BLD: 2 % (ref 0–7)
ERYTHROCYTE [DISTWIDTH] IN BLOOD BY AUTOMATED COUNT: 14 % (ref 11.5–14.5)
GLOBULIN SER CALC-MCNC: 3.3 G/DL (ref 2–4)
GLUCOSE SERPL-MCNC: 130 MG/DL (ref 65–100)
HCT VFR BLD AUTO: 38 % (ref 35–47)
HGB BLD-MCNC: 12.4 G/DL (ref 11.5–16)
IMM GRANULOCYTES # BLD AUTO: 0 K/UL (ref 0–0.04)
IMM GRANULOCYTES NFR BLD AUTO: 0 % (ref 0–0.5)
LYMPHOCYTES # BLD: 3 K/UL (ref 0.8–3.5)
LYMPHOCYTES NFR BLD: 33 % (ref 12–49)
MCH RBC QN AUTO: 30.2 PG (ref 26–34)
MCHC RBC AUTO-ENTMCNC: 32.6 G/DL (ref 30–36.5)
MCV RBC AUTO: 92.5 FL (ref 80–99)
MONOCYTES # BLD: 0.9 K/UL (ref 0–1)
MONOCYTES NFR BLD: 10 % (ref 5–13)
NEUTS SEG # BLD: 4.9 K/UL (ref 1.8–8)
NEUTS SEG NFR BLD: 55 % (ref 32–75)
NRBC # BLD: 0 K/UL (ref 0–0.01)
NRBC BLD-RTO: 0 PER 100 WBC
PLATELET # BLD AUTO: 295 K/UL (ref 150–400)
PMV BLD AUTO: 9.3 FL (ref 8.9–12.9)
POTASSIUM SERPL-SCNC: 4.3 MMOL/L (ref 3.5–5.1)
PROT SERPL-MCNC: 6.8 G/DL (ref 6.4–8.2)
RBC # BLD AUTO: 4.11 M/UL (ref 3.8–5.2)
SODIUM SERPL-SCNC: 135 MMOL/L (ref 136–145)
TROPONIN I SERPL-MCNC: <0.05 NG/ML
WBC # BLD AUTO: 9 K/UL (ref 3.6–11)

## 2019-11-11 PROCEDURE — 93005 ELECTROCARDIOGRAM TRACING: CPT

## 2019-11-11 PROCEDURE — 80053 COMPREHEN METABOLIC PANEL: CPT

## 2019-11-11 PROCEDURE — 74011250637 HC RX REV CODE- 250/637: Performed by: EMERGENCY MEDICINE

## 2019-11-11 PROCEDURE — 36415 COLL VENOUS BLD VENIPUNCTURE: CPT

## 2019-11-11 PROCEDURE — 96374 THER/PROPH/DIAG INJ IV PUSH: CPT

## 2019-11-11 PROCEDURE — 74011250636 HC RX REV CODE- 250/636: Performed by: EMERGENCY MEDICINE

## 2019-11-11 PROCEDURE — 84484 ASSAY OF TROPONIN QUANT: CPT

## 2019-11-11 PROCEDURE — 99285 EMERGENCY DEPT VISIT HI MDM: CPT

## 2019-11-11 PROCEDURE — 96376 TX/PRO/DX INJ SAME DRUG ADON: CPT

## 2019-11-11 PROCEDURE — 83880 ASSAY OF NATRIURETIC PEPTIDE: CPT

## 2019-11-11 PROCEDURE — 85025 COMPLETE CBC W/AUTO DIFF WBC: CPT

## 2019-11-11 PROCEDURE — 71045 X-RAY EXAM CHEST 1 VIEW: CPT

## 2019-11-11 RX ORDER — FUROSEMIDE 10 MG/ML
40 INJECTION INTRAMUSCULAR; INTRAVENOUS
Status: COMPLETED | OUTPATIENT
Start: 2019-11-11 | End: 2019-11-11

## 2019-11-11 RX ORDER — HYDROCODONE BITARTRATE AND ACETAMINOPHEN 7.5; 325 MG/1; MG/1
1 TABLET ORAL
Status: COMPLETED | OUTPATIENT
Start: 2019-11-11 | End: 2019-11-11

## 2019-11-11 RX ADMIN — FUROSEMIDE 40 MG: 10 INJECTION, SOLUTION INTRAMUSCULAR; INTRAVENOUS at 18:24

## 2019-11-11 RX ADMIN — FUROSEMIDE 40 MG: 10 INJECTION, SOLUTION INTRAMUSCULAR; INTRAVENOUS at 17:01

## 2019-11-11 RX ADMIN — HYDROCODONE BITARTRATE AND ACETAMINOPHEN 1 TABLET: 7.5; 325 TABLET ORAL at 16:25

## 2019-11-11 NOTE — ED PROVIDER NOTES
EMERGENCY DEPARTMENT HISTORY AND PHYSICAL EXAM      Date: 11/11/2019  Patient Name: Susan Healy    History of Presenting Illness     Chief Complaint   Patient presents with    Shortness of Breath       History Provided By: Patient    HPI: Susan Healy, 51 y.o. female with PMHx significant for CKD, CHF, diabetes, endometriosis, GERD, who presents with a chief complaint of shortness of breath and weight gain. Patient states that in the last 3 days she has gained 15 pounds of \"fluid. \"  She states for the last 2 days she has doubled her Lasix dose at home but this does not seem to be helping. States she is feeling more short of breath but does admit that she was recently diagnosed with bronchitis and is just recently finished her antibiotics. Has some ongoing cough with some mild chest pain associated with cough. No nausea, vomiting, diarrhea, dysuria. Does have some chronic pain related to her endometriosis which is not changed from her baseline. States that she called her nephrologist who told her to come to the ER. PCP: Ronald Jaimes, DO    There are no other complaints, changes, or physical findings at this time. Current Outpatient Medications   Medication Sig Dispense Refill    Nebulizer & Compressor machine Use q4 hours as needed for wheezing 1 Each 0    albuterol-ipratropium (DUO-NEB) 2.5 mg-0.5 mg/3 ml nebu 3 mL by Nebulization route every four (4) hours as needed (wheezing). 30 Nebule 1    esomeprazole (NEXIUM) 40 mg capsule TAKE ONE CAPSULE BY MOUTH DAILY 30 Cap 11    gabapentin (NEURONTIN) 300 mg capsule TAKE TWO CAPSULES BY MOUTH THREE TIMES A  Cap 5    losartan (COZAAR) 25 mg tablet Take 1 Tab by mouth daily.  fluticasone propionate (FLONASE) 50 mcg/actuation nasal spray SPRAY TWO SPRAYS IN EACH NOSTRIL ONCE DAILY 16 g 3    carvedilol (COREG) 6.25 mg tablet Take 1 Tab by mouth daily.  90 Tab 3    umeclidinium-vilanterol (ANORO ELLIPTA) 62.5-25 mcg/actuation inhaler Take 1 Puff by inhalation daily. 1 Inhaler 11    albuterol (PROVENTIL HFA, VENTOLIN HFA, PROAIR HFA) 90 mcg/actuation inhaler Take 1-2 Puffs by inhalation every four (4) hours as needed for Wheezing or Shortness of Breath. 1 Inhaler 0    insulin pump (PATIENT SUPPLIED) misc by SubCUTAneous route as needed.  HYDROcodone-acetaminophen (NORCO)  mg tablet Take 1 Tab by mouth.  LORazepam (ATIVAN) 2 mg tablet Take 2 mg by mouth four (4) times daily.  citalopram (CELEXA) 20 mg tablet Take 40 mg by mouth daily.  traZODone (DESYREL) 50 mg tablet Take 100 mg by mouth nightly.  insulin lispro (HUMALOG) 100 unit/mL injection Use as directed for insulin pump. Max 100 units per day--BILL MEDICARE PART B--DX E10.65--REPLACES NOVOLOG 3 Vial 11    CONTOUR NEXT TEST STRIPS strip Use as directed to test up to 8 x per day,  Uncontrolled Type 1 diabetes with Neuropathy (HCC) E10.40, E10.65 250 Strip 11    Insulin Syringe-Needle U-100 0.5 mL 31 gauge x 5/16\" syrg Use as directed up to 4 times daily 100 Syringe 11    glucagon (GLUCAGON EMERGENCY KIT, HUMAN,) 1 mg injection USE A DIRECTED 2 Vial 11    Insulin Needles, Disposable, (BD ULTRA-FINE MINI PEN NEEDLE) 31 gauge x 3/16\" ndle Use as directed up to 8 times daily 200 Pen Needle 11    atorvastatin (LIPITOR) 40 mg tablet Take 1 Tab by mouth daily. 90 Tab 3    levonorgestrel (MIRENA) 20 mcg/24 hr (5 years) IUD 1 Device by IntraUTERine route once.  ARIPiprazole (ABILIFY) 30 mg tablet Take 30 mg by mouth daily.  fentaNYL (DURAGESIC) 100 mcg/hr PATCH 1 Patch by TransDERmal route every seventy-two (72) hours.       FREESTYLE MARIELA SENSOR kit Use 1 sensor every 10 days as directed 3 Each 11     Past History     Past Medical History:  Past Medical History:   Diagnosis Date    Achilles tendon rupture     along with torn right patellar/femoral ligament    Arthritis     rt. foot    Chronic kidney disease     Chronic pain abdominal pain    Diabetes (HCC)     IDDM on insulin pump and sensor    DM type 1 (diabetes mellitus, type 1) ([facility] [address]     age 25    Endometriosis     s/p ex-lap 4x    Gastric ulcer     GERD (gastroesophageal reflux disease)     Herpes     Other and unspecified hyperlipidemia     Panic attacks     Psychiatric disorder     anxiety, panic attacks    PTSD (post-traumatic stress disorder)     Unspecified essential hypertension      Past Surgical History:  Past Surgical History:   Procedure Laterality Date    HX COLONOSCOPY      HX GYN      2 d&c's    HX GYN      laparoscopies x5 for endometriosis    HX GYN      mirena in place    HX ORTHOPAEDIC  2002    achiles tendon repair,talus repair    HX ORTHOPAEDIC      injections x2 to right shoulder    HX ORTHOPAEDIC Left 02/07/2019    3rd trigger finger release     Family History:  Family History   Problem Relation Age of Onset    Diabetes Mother         diet controlled    Diabetes Father         R foot amupation    Liver Disease Father     Heart Disease Paternal Uncle         MI in his 46s    Stroke Neg Hx      Social History:  Social History     Tobacco Use    Smoking status: Current Some Day Smoker     Packs/day: 0.75     Years: 28.00     Pack years: 21.00     Types: Cigarettes    Smokeless tobacco: Current User    Tobacco comment: Trying to quit   Substance Use Topics    Alcohol use: Yes     Comment: a beer every few months    Drug use: No     Types: OTC, Prescription     Allergies: Allergies   Allergen Reactions    Zocor [Simvastatin] Myalgia    Lisinopril Cough     Review of Systems   Review of Systems   Constitutional: Positive for unexpected weight change. Negative for chills and fever. HENT: Negative for congestion, rhinorrhea and sore throat. Respiratory: Positive for cough and shortness of breath. Cardiovascular: Negative for chest pain. Gastrointestinal: Negative for abdominal pain, nausea and vomiting.    Genitourinary: Negative for dysuria and urgency. Skin: Negative for rash. Neurological: Negative for dizziness, light-headedness and headaches. All other systems reviewed and are negative. Physical Exam   Physical Exam   Constitutional: She is oriented to person, place, and time. She appears well-developed and well-nourished. No distress. HENT:   Head: Normocephalic and atraumatic. Eyes: Pupils are equal, round, and reactive to light. Conjunctivae and EOM are normal.   Neck: Normal range of motion. Cardiovascular: Normal rate, regular rhythm and intact distal pulses. Pulmonary/Chest: Effort normal and breath sounds normal. No stridor. No respiratory distress. Speaks in complete sentences   Abdominal: Soft. She exhibits no distension. There is no tenderness. Musculoskeletal: Normal range of motion. She exhibits edema (2+ pitting b/l LE). Neurological: She is alert and oriented to person, place, and time. Skin: Skin is warm and dry. Psychiatric: She has a normal mood and affect. Nursing note and vitals reviewed. Diagnostic Study Results   Labs -     Recent Results (from the past 12 hour(s))   CBC WITH AUTOMATED DIFF    Collection Time: 11/11/19  3:52 PM   Result Value Ref Range    WBC 9.0 3.6 - 11.0 K/uL    RBC 4.11 3.80 - 5.20 M/uL    HGB 12.4 11.5 - 16.0 g/dL    HCT 38.0 35.0 - 47.0 %    MCV 92.5 80.0 - 99.0 FL    MCH 30.2 26.0 - 34.0 PG    MCHC 32.6 30.0 - 36.5 g/dL    RDW 14.0 11.5 - 14.5 %    PLATELET 290 619 - 839 K/uL    MPV 9.3 8.9 - 12.9 FL    NRBC 0.0 0  WBC    ABSOLUTE NRBC 0.00 0.00 - 0.01 K/uL    NEUTROPHILS 55 32 - 75 %    LYMPHOCYTES 33 12 - 49 %    MONOCYTES 10 5 - 13 %    EOSINOPHILS 2 0 - 7 %    BASOPHILS 0 0 - 1 %    IMMATURE GRANULOCYTES 0 0.0 - 0.5 %    ABS. NEUTROPHILS 4.9 1.8 - 8.0 K/UL    ABS. LYMPHOCYTES 3.0 0.8 - 3.5 K/UL    ABS. MONOCYTES 0.9 0.0 - 1.0 K/UL    ABS. EOSINOPHILS 0.2 0.0 - 0.4 K/UL    ABS. BASOPHILS 0.0 0.0 - 0.1 K/UL    ABS. IMM.  GRANS. 0.0 0.00 - 0.04 K/UL    DF AUTOMATED     METABOLIC PANEL, COMPREHENSIVE    Collection Time: 11/11/19  3:52 PM   Result Value Ref Range    Sodium 135 (L) 136 - 145 mmol/L    Potassium 4.3 3.5 - 5.1 mmol/L    Chloride 99 97 - 108 mmol/L    CO2 32 21 - 32 mmol/L    Anion gap 4 (L) 5 - 15 mmol/L    Glucose 130 (H) 65 - 100 mg/dL    BUN 24 (H) 6 - 20 MG/DL    Creatinine 1.30 (H) 0.55 - 1.02 MG/DL    BUN/Creatinine ratio 18 12 - 20      GFR est AA 53 (L) >60 ml/min/1.73m2    GFR est non-AA 44 (L) >60 ml/min/1.73m2    Calcium 8.6 8.5 - 10.1 MG/DL    Bilirubin, total 0.3 0.2 - 1.0 MG/DL    ALT (SGPT) 21 12 - 78 U/L    AST (SGOT) 17 15 - 37 U/L    Alk. phosphatase 84 45 - 117 U/L    Protein, total 6.8 6.4 - 8.2 g/dL    Albumin 3.5 3.5 - 5.0 g/dL    Globulin 3.3 2.0 - 4.0 g/dL    A-G Ratio 1.1 1.1 - 2.2     NT-PRO BNP    Collection Time: 11/11/19  3:52 PM   Result Value Ref Range    NT pro-BNP 70 <125 PG/ML   TROPONIN I    Collection Time: 11/11/19  3:52 PM   Result Value Ref Range    Troponin-I, Qt. <0.05 <0.05 ng/mL   EKG, 12 LEAD, INITIAL    Collection Time: 11/11/19  3:58 PM   Result Value Ref Range    Ventricular Rate 76 BPM    Atrial Rate 76 BPM    P-R Interval 182 ms    QRS Duration 74 ms    Q-T Interval 386 ms    QTC Calculation (Bezet) 434 ms    Calculated P Axis 52 degrees    Calculated R Axis 55 degrees    Calculated T Axis 64 degrees    Diagnosis       Normal sinus rhythm  Septal infarct , age undetermined  When compared with ECG of 21-AUG-2019 18:03,  Septal infarct is now present         Radiologic Studies -   XR CHEST PORT   Final Result   IMPRESSION: Diffuse bilateral increased interstitial markings. Xr Chest Port    Result Date: 11/11/2019  IMPRESSION: Diffuse bilateral increased interstitial markings. Medical Decision Making   I am the first provider for this patient. I reviewed the vital signs, available nursing notes, past medical history, past surgical history, family history and social history.     Vital Signs-Reviewed the patient's vital signs. Patient Vitals for the past 12 hrs:   Temp Pulse Resp BP SpO2   11/11/19 1900    112/76 96 %   11/11/19 1830  71  117/74 95 %   11/11/19 1800  74  117/74 94 %   11/11/19 1730    143/74 91 %   11/11/19 1700  75 19 120/82 94 %   11/11/19 1603     93 %   11/11/19 1600  84 19 110/72 91 %   11/11/19 1538 98.4 °F (36.9 °C) 81 19 134/72 96 %       Pulse Oximetry Analysis - 96% on RA    Cardiac Monitor:   Rate: 74 bpm  Rhythm: Normal Sinus Rhythm      ED EKG interpretation:  Rhythm: normal sinus rhythm; and regular . Rate (approx.): 76; Axis: normal; P wave: normal; QRS interval: normal ; ST/T wave: normal; Other findings: normal. This EKG was interpreted by TERI Cage MD,ED Provider. Records Reviewed: Nursing Notes and Old Medical Records    Provider Notes (Medical Decision Making):   Ddx: CHF, pneumonia, URI, worsening renal failure, ACS    Plan to check basic labs, troponin, BNP, chest x-ray, EKG, will give a dose of diuretic. Of note, patient is not hypoxic on room air here and is not conversationally dyspneic. ED Course:   Initial assessment performed. The patients presenting problems have been discussed, and they are in agreement with the care plan formulated and outlined with them. I have encouraged them to ask questions as they arise throughout their visit. ED Course as of Nov 11 2235 Mon Nov 11, 2019   1905 Patient feeling much better after diuresis. Was never hypoxic. Stable for d/c with outpatient follow up with nephrology and PCP    [JUAN FRANCISCO]      ED Course User Index  Sagar Vanessa MD       Critical Care:  none    Disposition:  Discharge Note:  7:08 PM  The patient has been re-evaluated and is ready for discharge. Reviewed available results with patient. Counseled patient on diagnosis and care plan. Patient has expressed understanding, and all questions have been answered.  Patient agrees with plan and agrees to follow up as recommended, or to return to the ED if their symptoms worsen. Discharge instructions have been provided and explained to the patient, along with reasons to return to the ED. PLAN:  1. Discharge Medication List as of 11/11/2019  7:08 PM        2. Follow-up Information     Follow up With Specialties Details Why Silvio Pride, DO Internal Medicine Schedule an appointment as soon as possible for a visit  [address] [facility]  [phone]      your nephrologist  Schedule an appointment as soon as possible for a visit      \[facility]\"" EMERGENCY DEPT Emergency Medicine  As needed, If symptoms worsen [address]  [address] [facility]  [phone]        Return to ED if worse     Diagnosis     Clinical Impression:   1. Acute pulmonary edema (HCC)    2. SOB (shortness of breath)        This note will not be viewable in [employer]. Please note that this dictation was completed with [employer]ek[employer], the computer voice recognition software. Quite often unanticipated grammatical, syntax, homophones, and other interpretive errors are inadvertently transcribed by the computer software. Please disregard these errors.   Please excuse any errors that have escaped final proofreading

## 2019-11-11 NOTE — ED TRIAGE NOTES
Pt BIB EMS for shortness of breath. Pt states that she has been lethargic the last 4 days and retaining fluid. She states she is SOB even at rest. Pt is in no apparent distress, placed on monitor and VSS. Pt changed into gown.  MD at bedside

## 2019-11-12 LAB
ATRIAL RATE: 76 BPM
CALCULATED P AXIS, ECG09: 52 DEGREES
CALCULATED R AXIS, ECG10: 55 DEGREES
CALCULATED T AXIS, ECG11: 64 DEGREES
DIAGNOSIS, 93000: NORMAL
P-R INTERVAL, ECG05: 182 MS
Q-T INTERVAL, ECG07: 386 MS
QRS DURATION, ECG06: 74 MS
QTC CALCULATION (BEZET), ECG08: 434 MS
VENTRICULAR RATE, ECG03: 76 BPM

## 2019-11-12 NOTE — DISCHARGE INSTRUCTIONS

## 2019-11-12 NOTE — ED NOTES
Pt discharged at this time. VSS at time of discharge. PIV removed with no complications. Discharge instructions reviewed with pt, pt verbalized understanding at this time and denies any questions. Pt dressed and ambulated out of department with family.  All care provided within pt best interest.

## 2019-11-22 ENCOUNTER — TELEPHONE (OUTPATIENT)
Dept: ENDOCRINOLOGY | Age: 49
End: 2019-11-22

## 2019-11-22 ENCOUNTER — HOSPITAL ENCOUNTER (EMERGENCY)
Age: 49
Discharge: HOME | End: 2019-11-22
Attending: EMERGENCY MEDICINE
Payer: MEDICARE

## 2019-11-22 ENCOUNTER — APPOINTMENT (OUTPATIENT)
Dept: GENERAL RADIOLOGY | Age: 49
End: 2019-11-22
Attending: EMERGENCY MEDICINE
Payer: MEDICARE

## 2019-11-22 VITALS
HEART RATE: 70 BPM | RESPIRATION RATE: 18 BRPM | SYSTOLIC BLOOD PRESSURE: 138 MMHG | DIASTOLIC BLOOD PRESSURE: 70 MMHG | WEIGHT: 236.11 LBS | OXYGEN SATURATION: 93 % | TEMPERATURE: 98.6 F | HEIGHT: 64 IN | BODY MASS INDEX: 40.31 KG/M2

## 2019-11-22 DIAGNOSIS — J44.1 COPD EXACERBATION (HCC): Primary | ICD-10-CM

## 2019-11-22 DIAGNOSIS — R73.9 HYPERGLYCEMIA: ICD-10-CM

## 2019-11-22 LAB
ALBUMIN SERPL-MCNC: 3.7 G/DL (ref 3.5–5)
ALBUMIN/GLOB SERPL: 1.2 {RATIO} (ref 1.1–2.2)
ALP SERPL-CCNC: 105 U/L (ref 45–117)
ALT SERPL-CCNC: 19 U/L (ref 12–78)
ANION GAP SERPL CALC-SCNC: 6 MMOL/L (ref 5–15)
APPEARANCE UR: CLEAR
AST SERPL-CCNC: 20 U/L (ref 15–37)
ATRIAL RATE: 91 BPM
BACTERIA URNS QL MICRO: NEGATIVE /HPF
BASOPHILS # BLD: 0 K/UL (ref 0–0.1)
BASOPHILS NFR BLD: 0 % (ref 0–1)
BILIRUB SERPL-MCNC: 0.3 MG/DL (ref 0.2–1)
BILIRUB UR QL: NEGATIVE
BNP SERPL-MCNC: 101 PG/ML
BUN SERPL-MCNC: 34 MG/DL (ref 6–20)
BUN/CREAT SERPL: 23 (ref 12–20)
CALCIUM SERPL-MCNC: 8.9 MG/DL (ref 8.5–10.1)
CALCULATED P AXIS, ECG09: 59 DEGREES
CALCULATED R AXIS, ECG10: -12 DEGREES
CALCULATED T AXIS, ECG11: 53 DEGREES
CHLORIDE SERPL-SCNC: 90 MMOL/L (ref 97–108)
CK MB CFR SERPL CALC: 1.1 % (ref 0–2.5)
CK MB SERPL-MCNC: 6.2 NG/ML (ref 5–25)
CK SERPL-CCNC: 576 U/L (ref 26–192)
CO2 SERPL-SCNC: 30 MMOL/L (ref 21–32)
COLOR UR: ABNORMAL
CREAT SERPL-MCNC: 1.48 MG/DL (ref 0.55–1.02)
DIAGNOSIS, 93000: NORMAL
DIFFERENTIAL METHOD BLD: NORMAL
EOSINOPHIL # BLD: 0.1 K/UL (ref 0–0.4)
EOSINOPHIL NFR BLD: 1 % (ref 0–7)
EPITH CASTS URNS QL MICRO: ABNORMAL /LPF
ERYTHROCYTE [DISTWIDTH] IN BLOOD BY AUTOMATED COUNT: 13.7 % (ref 11.5–14.5)
GLOBULIN SER CALC-MCNC: 3.2 G/DL (ref 2–4)
GLUCOSE BLD STRIP.AUTO-MCNC: 295 MG/DL (ref 65–100)
GLUCOSE SERPL-MCNC: 473 MG/DL (ref 65–100)
GLUCOSE UR STRIP.AUTO-MCNC: 500 MG/DL
HCT VFR BLD AUTO: 35.1 % (ref 35–47)
HGB BLD-MCNC: 11.5 G/DL (ref 11.5–16)
HGB UR QL STRIP: NEGATIVE
HYALINE CASTS URNS QL MICRO: ABNORMAL /LPF (ref 0–5)
IMM GRANULOCYTES # BLD AUTO: 0 K/UL (ref 0–0.04)
IMM GRANULOCYTES NFR BLD AUTO: 0 % (ref 0–0.5)
KETONES UR QL STRIP.AUTO: NEGATIVE MG/DL
LEUKOCYTE ESTERASE UR QL STRIP.AUTO: NEGATIVE
LYMPHOCYTES # BLD: 2 K/UL (ref 0.8–3.5)
LYMPHOCYTES NFR BLD: 25 % (ref 12–49)
MCH RBC QN AUTO: 28.8 PG (ref 26–34)
MCHC RBC AUTO-ENTMCNC: 32.8 G/DL (ref 30–36.5)
MCV RBC AUTO: 87.8 FL (ref 80–99)
MONOCYTES # BLD: 0.7 K/UL (ref 0–1)
MONOCYTES NFR BLD: 8 % (ref 5–13)
NEUTS SEG # BLD: 5.3 K/UL (ref 1.8–8)
NEUTS SEG NFR BLD: 66 % (ref 32–75)
NITRITE UR QL STRIP.AUTO: NEGATIVE
NRBC # BLD: 0 K/UL (ref 0–0.01)
NRBC BLD-RTO: 0 PER 100 WBC
P-R INTERVAL, ECG05: 180 MS
PH UR STRIP: 6 [PH] (ref 5–8)
PLATELET # BLD AUTO: 278 K/UL (ref 150–400)
PMV BLD AUTO: 10 FL (ref 8.9–12.9)
POTASSIUM SERPL-SCNC: 3.9 MMOL/L (ref 3.5–5.1)
PROT SERPL-MCNC: 6.9 G/DL (ref 6.4–8.2)
PROT UR STRIP-MCNC: NEGATIVE MG/DL
Q-T INTERVAL, ECG07: 388 MS
QRS DURATION, ECG06: 82 MS
QTC CALCULATION (BEZET), ECG08: 477 MS
RBC # BLD AUTO: 4 M/UL (ref 3.8–5.2)
RBC #/AREA URNS HPF: ABNORMAL /HPF (ref 0–5)
SERVICE CMNT-IMP: ABNORMAL
SODIUM SERPL-SCNC: 126 MMOL/L (ref 136–145)
SP GR UR REFRACTOMETRY: 1.01 (ref 1–1.03)
TROPONIN I SERPL-MCNC: <0.05 NG/ML
UROBILINOGEN UR QL STRIP.AUTO: 0.2 EU/DL (ref 0.2–1)
VENTRICULAR RATE, ECG03: 91 BPM
WBC # BLD AUTO: 8.1 K/UL (ref 3.6–11)
WBC URNS QL MICRO: ABNORMAL /HPF (ref 0–4)

## 2019-11-22 PROCEDURE — 80053 COMPREHEN METABOLIC PANEL: CPT

## 2019-11-22 PROCEDURE — 82550 ASSAY OF CK (CPK): CPT

## 2019-11-22 PROCEDURE — 81001 URINALYSIS AUTO W/SCOPE: CPT

## 2019-11-22 PROCEDURE — 74011250636 HC RX REV CODE- 250/636: Performed by: STUDENT IN AN ORGANIZED HEALTH CARE EDUCATION/TRAINING PROGRAM

## 2019-11-22 PROCEDURE — 82553 CREATINE MB FRACTION: CPT

## 2019-11-22 PROCEDURE — 99284 EMERGENCY DEPT VISIT MOD MDM: CPT

## 2019-11-22 PROCEDURE — 82962 GLUCOSE BLOOD TEST: CPT

## 2019-11-22 PROCEDURE — 77030029684 HC NEB SM VOL KT MONA -A

## 2019-11-22 PROCEDURE — 83880 ASSAY OF NATRIURETIC PEPTIDE: CPT

## 2019-11-22 PROCEDURE — 74011250636 HC RX REV CODE- 250/636: Performed by: EMERGENCY MEDICINE

## 2019-11-22 PROCEDURE — 96374 THER/PROPH/DIAG INJ IV PUSH: CPT

## 2019-11-22 PROCEDURE — 93005 ELECTROCARDIOGRAM TRACING: CPT

## 2019-11-22 PROCEDURE — 94640 AIRWAY INHALATION TREATMENT: CPT

## 2019-11-22 PROCEDURE — 74011000250 HC RX REV CODE- 250: Performed by: STUDENT IN AN ORGANIZED HEALTH CARE EDUCATION/TRAINING PROGRAM

## 2019-11-22 PROCEDURE — 36415 COLL VENOUS BLD VENIPUNCTURE: CPT

## 2019-11-22 PROCEDURE — 77010033678 HC OXYGEN DAILY

## 2019-11-22 PROCEDURE — 96375 TX/PRO/DX INJ NEW DRUG ADDON: CPT

## 2019-11-22 PROCEDURE — 71046 X-RAY EXAM CHEST 2 VIEWS: CPT

## 2019-11-22 PROCEDURE — 84484 ASSAY OF TROPONIN QUANT: CPT

## 2019-11-22 PROCEDURE — 85025 COMPLETE CBC W/AUTO DIFF WBC: CPT

## 2019-11-22 RX ORDER — IPRATROPIUM BROMIDE AND ALBUTEROL SULFATE 2.5; .5 MG/3ML; MG/3ML
3 SOLUTION RESPIRATORY (INHALATION) 3 TIMES DAILY
Status: DISCONTINUED | OUTPATIENT
Start: 2019-11-22 | End: 2019-11-22 | Stop reason: HOSPADM

## 2019-11-22 RX ORDER — FUROSEMIDE 10 MG/ML
60 INJECTION INTRAMUSCULAR; INTRAVENOUS
Status: COMPLETED | OUTPATIENT
Start: 2019-11-22 | End: 2019-11-22

## 2019-11-22 RX ORDER — IPRATROPIUM BROMIDE AND ALBUTEROL SULFATE 2.5; .5 MG/3ML; MG/3ML
3 SOLUTION RESPIRATORY (INHALATION)
Status: COMPLETED | OUTPATIENT
Start: 2019-11-22 | End: 2019-11-22

## 2019-11-22 RX ORDER — PREDNISONE 20 MG/1
40 TABLET ORAL DAILY
Qty: 10 TAB | Refills: 0 | Status: SHIPPED | OUTPATIENT
Start: 2019-11-22 | End: 2019-11-27

## 2019-11-22 RX ADMIN — FUROSEMIDE 60 MG: 10 INJECTION, SOLUTION INTRAMUSCULAR; INTRAVENOUS at 14:33

## 2019-11-22 RX ADMIN — METHYLPREDNISOLONE SODIUM SUCCINATE 125 MG: 125 INJECTION, POWDER, FOR SOLUTION INTRAMUSCULAR; INTRAVENOUS at 14:45

## 2019-11-22 RX ADMIN — IPRATROPIUM BROMIDE AND ALBUTEROL SULFATE 3 ML: .5; 3 SOLUTION RESPIRATORY (INHALATION) at 14:37

## 2019-11-22 RX ADMIN — IPRATROPIUM BROMIDE AND ALBUTEROL SULFATE 3 ML: .5; 3 SOLUTION RESPIRATORY (INHALATION) at 14:27

## 2019-11-22 RX ADMIN — IPRATROPIUM BROMIDE AND ALBUTEROL SULFATE 3 ML: .5; 3 SOLUTION RESPIRATORY (INHALATION) at 14:18

## 2019-11-22 NOTE — ED NOTES
Assumed care of patient from [address] [place] [address]. At this time. Patient on monitor x2. In no apparent distress.

## 2019-11-22 NOTE — DISCHARGE INSTRUCTIONS

## 2019-11-22 NOTE — PROGRESS NOTES
ADULT PROTOCOL: JET AEROSOL ASSESSMENT    Patient  Caroline Moyer     51 y.o.   female     11/22/2019  2:38 PM    Breath Sounds Pre Procedure: Right Breath Sounds: Diminished                               Left Breath Sounds: Diminished    Breath Sounds Post Procedure: Right Breath Sounds: Diminished                                 Left Breath Sounds: Diminished    Breathing pattern: Pre procedure Breathing Pattern: Regular          Post procedure Breathing Pattern: Regular    Heart Rate: Pre procedure Pulse: 78           Post procedure Pulse: 76    Resp Rate: Pre procedure Respirations: 18           Post procedure Respirations: 18      Oxygen: O2 Device: Nasal cannula   3L         SpO2: Pre procedure SpO2: 93 %                 Post procedure SpO2: 96 %      Nebulizer Therapy: Current medications Aerosolized Medications: DuoNeb          Smoking History:  Current    Problem List:   Patient Active Problem List   Diagnosis Code    Hyperlipidemia LDL goal <100 E78.5    Uncontrolled type I diabetes mellitus with neuropathy ([facility]) E10.40, E10.65    Endometriosis N80.9    Abnormal weight gain R63.5    Elevated LFTs R94.5    Positive blood culture R78.81    Tobacco abuse Z72.0    Anxiety and depression F41.9, F32.9    DKA (diabetic ketoacidoses) ([facility]) E11.10    Aspiration pneumonia ([facility]) J69.0    Essential hypertension I10    Diabetic nephropathy associated with type 1 diabetes mellitus ([facility]) E10.21    Severe obesity ([facility]) E66.01    COPD exacerbation ([facility]) J44.1    Drug overdose T50.901A    Change in mental status R41.82    DEBORAH (acute kidney injury) [facility]) N17.9       Respiratory Therapist: Jennifer Bernard V RT

## 2019-11-22 NOTE — ED NOTES
Cherie Riley has reviewed discharge instructions with the patient. The patient verbalized understanding. Patient ambulatory out of ED with papers.

## 2019-11-22 NOTE — ED NOTES
Pt. Requesting pain medication at this time. Spoke with Dr. Iglesia Hutchinson who is aware. No new orders received.

## 2019-11-22 NOTE — TELEPHONE ENCOUNTER
Pt called to let you know that she's on the way to the hospital here at [facility]. She stated shes' holding 20lbs of fluids. She stated Dr. Dorian Beavers sent her.

## 2019-11-22 NOTE — ED NOTES
Pt sitting in wheelchair and O2 provided while pt is waiting for a room assignment. No obvious distress noted. Pt speaking in clear sentences without increased WOB.

## 2019-11-22 NOTE — ED PROVIDER NOTES
EMERGENCY DEPARTMENT HISTORY AND PHYSICAL EXAM          Date: 11/22/2019  Patient Name: Sanjay Greenberg  Attending of Record: Nayeli King    History of Presenting Illness     Chief Complaint   Patient presents with    Shortness of Breath     Ambulatory into the ED with c/o SOB and fluid retention-reports a 20 lb weight gain over the week. History Provided By: Patient    HPI: Sanjay Greenberg is a 51 y.o. female, pmhx COPD, DM1, CKD, who presents to the ED c/o dyspnea. She's been feeling more wheeziness, dyspnea, and chest tightness for several days. She has been using her home inhalers with some, but not full relief. Denies cough, fever, recent illness, and sick contacts. She continues to smoke 1 pack of cigarettes daily. She is also complaining of worsening peripheral edema over the last several days. She has been seen for the same complaint by her nephrologist who started her on lasix, which she has been taking as prescribed. She denies any chest pain, palpitations, or syncope. PCP: Chele Grider,     There are no other complaints, changes, or physical findings at this time. Current Facility-Administered Medications   Medication Dose Route Frequency Provider Last Rate Last Dose    albuterol-ipratropium (DUO-NEB) 2.5 MG-0.5 MG/3 ML  3 mL Nebulization TID Joss Rashid MD   3 mL at 11/22/19 0307     Current Outpatient Medications   Medication Sig Dispense Refill    predniSONE (DELTASONE) 20 mg tablet Take 40 mg by mouth daily for 5 days. With Breakfast  Indications: worsening chronic obstructive pulmonary disease 10 Tab 0    atorvastatin (LIPITOR) 40 mg tablet TAKE ONE TABLET BY MOUTH DAILY 90 Tab 3    Nebulizer & Compressor machine Use q4 hours as needed for wheezing 1 Each 0    albuterol-ipratropium (DUO-NEB) 2.5 mg-0.5 mg/3 ml nebu 3 mL by Nebulization route every four (4) hours as needed (wheezing).  30 Nebule 1    esomeprazole (NEXIUM) 40 mg capsule TAKE ONE CAPSULE BY MOUTH DAILY 30 Cap 11    gabapentin (NEURONTIN) 300 mg capsule TAKE TWO CAPSULES BY MOUTH THREE TIMES A  Cap 5    losartan (COZAAR) 25 mg tablet Take 1 Tab by mouth daily.  fluticasone propionate (FLONASE) 50 mcg/actuation nasal spray SPRAY TWO SPRAYS IN EACH NOSTRIL ONCE DAILY 16 g 3    carvedilol (COREG) 6.25 mg tablet Take 1 Tab by mouth daily. 90 Tab 3    umeclidinium-vilanterol (ANORO ELLIPTA) 62.5-25 mcg/actuation inhaler Take 1 Puff by inhalation daily. 1 Inhaler 11    albuterol (PROVENTIL HFA, VENTOLIN HFA, PROAIR HFA) 90 mcg/actuation inhaler Take 1-2 Puffs by inhalation every four (4) hours as needed for Wheezing or Shortness of Breath. 1 Inhaler 0    insulin pump (PATIENT SUPPLIED) misc by SubCUTAneous route as needed.  HYDROcodone-acetaminophen (NORCO)  mg tablet Take 1 Tab by mouth.  LORazepam (ATIVAN) 2 mg tablet Take 2 mg by mouth four (4) times daily.  citalopram (CELEXA) 20 mg tablet Take 40 mg by mouth daily.  traZODone (DESYREL) 50 mg tablet Take 100 mg by mouth nightly.  insulin lispro (HUMALOG) 100 unit/mL injection Use as directed for insulin pump. Max 100 units per day--BILL MEDICARE PART B--DX E10.65--REPLACES NOVOLOG 3 Vial 11    CONTOUR NEXT TEST STRIPS strip Use as directed to test up to 8 x per day,  Uncontrolled Type 1 diabetes with Neuropathy (HCC) E10.40, E10.65 250 Strip 11    Insulin Syringe-Needle U-100 0.5 mL 31 gauge x 5/16\" syrg Use as directed up to 4 times daily 100 Syringe 11    glucagon (GLUCAGON EMERGENCY KIT, HUMAN,) 1 mg injection USE A DIRECTED 2 Vial 11    Insulin Needles, Disposable, (BD ULTRA-FINE MINI PEN NEEDLE) 31 gauge x 3/16\" ndle Use as directed up to 8 times daily 200 Pen Needle 11    levonorgestrel (MIRENA) 20 mcg/24 hr (5 years) IUD 1 Device by IntraUTERine route once.  ARIPiprazole (ABILIFY) 30 mg tablet Take 30 mg by mouth daily.       fentaNYL (DURAGESIC) 100 mcg/hr PATCH 1 Patch by TransDERmal route every seventy-two (72) hours.  FREESTYLE MARIELA SENSOR kit Use 1 sensor every 10 days as directed 3 Each 11       Past History     Past Medical History:  Past Medical History:   Diagnosis Date    Achilles tendon rupture     along with torn right patellar/femoral ligament    Arthritis     rt. foot    Chronic kidney disease     Chronic pain     abdominal pain    Diabetes (HCC)     IDDM on insulin pump and sensor    DM type 1 (diabetes mellitus, type 1) (HCC)     age 25    Endometriosis     s/p ex-lap 4x    Gastric ulcer     GERD (gastroesophageal reflux disease)     Herpes     Other and unspecified hyperlipidemia     Panic attacks     Psychiatric disorder     anxiety, panic attacks    PTSD (post-traumatic stress disorder)     Unspecified essential hypertension        Past Surgical History:  Past Surgical History:   Procedure Laterality Date    HX COLONOSCOPY      HX GYN      2 d&c's    HX GYN      laparoscopies x5 for endometriosis    HX GYN      mirena in place    HX ORTHOPAEDIC  2002    achiles tendon repair,talus repair    HX ORTHOPAEDIC      injections x2 to right shoulder    HX ORTHOPAEDIC Left 02/07/2019    3rd trigger finger release       Family History:  Family History   Problem Relation Age of Onset    Diabetes Mother         diet controlled    Diabetes Father         R foot amupation    Liver Disease Father     Heart Disease Paternal Uncle         MI in his 46s    Stroke Neg Hx        Social History:  Social History     Tobacco Use    Smoking status: Current Some Day Smoker     Packs/day: 0.75     Years: 28.00     Pack years: 21.00     Types: Cigarettes    Smokeless tobacco: Current User    Tobacco comment: Trying to quit   Substance Use Topics    Alcohol use: Yes     Comment: a beer every few months    Drug use: No     Types: OTC, Prescription       Allergies:   Allergies   Allergen Reactions    Metolazone Other (comments)     Caused hyponatremia    Zocor [Simvastatin] Myalgia    Lisinopril Cough         Review of Systems   Review of Systems   Constitutional: Negative for chills and fever. HENT: Negative for sore throat and trouble swallowing. Eyes: Negative for visual disturbance. Respiratory: Positive for chest tightness, shortness of breath and wheezing. Negative for cough. Cardiovascular: Positive for leg swelling. Negative for chest pain and palpitations. Gastrointestinal: Negative for abdominal distention, abdominal pain, diarrhea, nausea and vomiting. Endocrine: Negative for polydipsia and polyuria. Genitourinary: Negative for dysuria and hematuria. Musculoskeletal: Negative for arthralgias and myalgias. Skin: Negative for rash. Neurological: Negative for dizziness, syncope, weakness and headaches. Physical Exam   Physical Exam  Vitals signs and nursing note reviewed. Constitutional:       Appearance: She is obese. HENT:      Head: Normocephalic. Mouth/Throat:      Mouth: Mucous membranes are moist.   Cardiovascular:      Rate and Rhythm: Normal rate and regular rhythm. Pulses: Normal pulses. Pulmonary:      Effort: No respiratory distress. Breath sounds: Examination of the right-upper field reveals wheezing. Examination of the left-upper field reveals wheezing. Examination of the right-middle field reveals decreased breath sounds and wheezing. Examination of the left-middle field reveals decreased breath sounds and wheezing. Examination of the right-lower field reveals decreased breath sounds and wheezing. Examination of the left-lower field reveals decreased breath sounds and wheezing. Decreased breath sounds and wheezing present. Chest:      Chest wall: No tenderness. Abdominal:      Palpations: Abdomen is soft. Tenderness: There is no tenderness. Musculoskeletal: Normal range of motion. Right lower leg: Edema present. Left lower leg: Edema present.    Skin:     General: Skin is warm and dry.   Neurological:      Mental Status: She is alert and oriented to person, place, and time.    Psychiatric:         Mood and Affect: Mood normal.         Behavior: Behavior normal.         Diagnostic Study Results     Labs -     Recent Results (from the past 12 hour(s))   EKG, 12 LEAD, INITIAL    Collection Time: 11/22/19 12:22 PM   Result Value Ref Range    Ventricular Rate 91 BPM    Atrial Rate 91 BPM    P-R Interval 180 ms    QRS Duration 82 ms    Q-T Interval 388 ms    QTC Calculation (Bezet) 477 ms    Calculated P Axis 59 degrees    Calculated R Axis -12 degrees    Calculated T Axis 53 degrees    Diagnosis       Normal sinus rhythm  Possible Left atrial enlargement  When compared with ECG of 11-NOV-2019 15:58,  Questionable change in QRS axis     URINALYSIS W/MICROSCOPIC    Collection Time: 11/22/19  1:01 PM   Result Value Ref Range    Color YELLOW/STRAW      Appearance CLEAR CLEAR      Specific gravity 1.011 1.003 - 1.030      pH (UA) 6.0 5.0 - 8.0      Protein NEGATIVE  NEG mg/dL    Glucose 500 (A) NEG mg/dL    Ketone NEGATIVE  NEG mg/dL    Bilirubin NEGATIVE  NEG      Blood NEGATIVE  NEG      Urobilinogen 0.2 0.2 - 1.0 EU/dL    Nitrites NEGATIVE  NEG      Leukocyte Esterase NEGATIVE  NEG      WBC 0-4 0 - 4 /hpf    RBC 0-5 0 - 5 /hpf    Epithelial cells FEW FEW /lpf    Bacteria NEGATIVE  NEG /hpf    Hyaline cast 0-2 0 - 5 /lpf   CBC WITH AUTOMATED DIFF    Collection Time: 11/22/19  1:49 PM   Result Value Ref Range    WBC 8.1 3.6 - 11.0 K/uL    RBC 4.00 3.80 - 5.20 M/uL    HGB 11.5 11.5 - 16.0 g/dL    HCT 35.1 35.0 - 47.0 %    MCV 87.8 80.0 - 99.0 FL    MCH 28.8 26.0 - 34.0 PG    MCHC 32.8 30.0 - 36.5 g/dL    RDW 13.7 11.5 - 14.5 %    PLATELET 586 687 - 068 K/uL    MPV 10.0 8.9 - 12.9 FL    NRBC 0.0 0  WBC    ABSOLUTE NRBC 0.00 0.00 - 0.01 K/uL    NEUTROPHILS 66 32 - 75 %    LYMPHOCYTES 25 12 - 49 %    MONOCYTES 8 5 - 13 %    EOSINOPHILS 1 0 - 7 %    BASOPHILS 0 0 - 1 %    IMMATURE GRANULOCYTES 0 0.0 - 0.5 %    ABS. NEUTROPHILS 5.3 1.8 - 8.0 K/UL    ABS. LYMPHOCYTES 2.0 0.8 - 3.5 K/UL    ABS. MONOCYTES 0.7 0.0 - 1.0 K/UL    ABS. EOSINOPHILS 0.1 0.0 - 0.4 K/UL    ABS. BASOPHILS 0.0 0.0 - 0.1 K/UL    ABS. IMM. GRANS. 0.0 0.00 - 0.04 K/UL    DF AUTOMATED     TROPONIN I    Collection Time: 11/22/19  1:53 PM   Result Value Ref Range    Troponin-I, Qt. <0.05 <0.05 ng/mL   NT-PRO BNP    Collection Time: 11/22/19  1:53 PM   Result Value Ref Range    NT pro- <125 PG/ML   CK W/ REFLX CKMB    Collection Time: 11/22/19  1:53 PM   Result Value Ref Range     (H) 26 - 900 U/L   METABOLIC PANEL, COMPREHENSIVE    Collection Time: 11/22/19  1:53 PM   Result Value Ref Range    Sodium 126 (L) 136 - 145 mmol/L    Potassium 3.9 3.5 - 5.1 mmol/L    Chloride 90 (L) 97 - 108 mmol/L    CO2 30 21 - 32 mmol/L    Anion gap 6 5 - 15 mmol/L    Glucose 473 (H) 65 - 100 mg/dL    BUN 34 (H) 6 - 20 MG/DL    Creatinine 1.48 (H) 0.55 - 1.02 MG/DL    BUN/Creatinine ratio 23 (H) 12 - 20      GFR est AA 46 (L) >60 ml/min/1.73m2    GFR est non-AA 38 (L) >60 ml/min/1.73m2    Calcium 8.9 8.5 - 10.1 MG/DL    Bilirubin, total 0.3 0.2 - 1.0 MG/DL    ALT (SGPT) 19 12 - 78 U/L    AST (SGOT) 20 15 - 37 U/L    Alk. phosphatase 105 45 - 117 U/L    Protein, total 6.9 6.4 - 8.2 g/dL    Albumin 3.7 3.5 - 5.0 g/dL    Globulin 3.2 2.0 - 4.0 g/dL    A-G Ratio 1.2 1.1 - 2.2     CK-MB,QUANT. Collection Time: 11/22/19  1:53 PM   Result Value Ref Range    CK - MB 6.2 (H) <3.6 NG/ML    CK-MB Index 1.1 0.0 - 2.5         Radiologic Studies -   XR CHEST PA LAT   Final Result   IMPRESSION: No acute changes. CT Results  (Last 48 hours)    None        CXR Results  (Last 48 hours)               11/22/19 1314  XR CHEST PA LAT Final result    Impression:  IMPRESSION: No acute changes. Narrative:  Clinical indication: Shortness of breath. Frontal and lateral views of the chest obtained, comparison November 11, 2019.    Heart size is stable and normal. Mild interstitial prominence is unchanged. There is no focal consolidation. Next                   Medical Decision Making   I am the first provider for this patient. I reviewed the vital signs, available nursing notes, past medical history, past surgical history, family history and social history. Vital Signs-Reviewed the patient's vital signs. Patient Vitals for the past 12 hrs:   Temp Pulse Resp BP SpO2   11/22/19 1437     93 %   11/22/19 1433  70      11/22/19 1427     96 %   11/22/19 1418     93 %   11/22/19 1300     97 %   11/22/19 1217 98.6 °F (37 °C) 99 18 138/70 (!) 86 %       Records Reviewed: Nursing Notes    Provider Notes (Medical Decision Making):     DDx: COPD exacerbation, lymphedema, hyperglycemia, PNA    Patient w/COPD presents with dyspnea, wheezing, and lower extremity edema. Pulmonary exam and CXR consistent with COPD exacerbation. Will give DuoNebs, methylprednisolone and reassess. Lower extremity edema chronic and likely represents lymphedema. Will treat with diuresis here and recommend soon follow-up with her nephrologist.    ED Course:   Initial assessment performed. The patients presenting problems have been discussed, and they are in agreement with the care plan formulated and outlined with them. I have encouraged them to ask questions as they arise throughout their visit. 3:40 PM  Reassessed, has been doing well off supplemental O2, sats in the mid 90's with markedly increased breath sounds and less wheezing. 60 mg IV lasix given for peripheral edema. Hyponatremia likely pseudohyponatremia given hyperglycemia - corrects to 135. Patient dosed herself 22 units insulin via her pump, recheck was 295. Will d/c home with prednisone. EKG interpretation: (Preliminary)  Rhythm: normal sinus rhythm.  Rate (approx.): 91 bpm; Axis: normal; Normal TX, QRS, QTc intervals; ST/T wave: normal Other findings: normal.    Progress note:    Pt noted to be feeling better, ready for discharge. Updated pt and/or family on all final lab and imaging findings. Will follow up as instructed. All questions have been answered, pt voiced understanding and agreement with plan. Specific return precautions provided as well as instructions to return to the ED should sx worsen at any time. Vital signs stable for discharge. Diagnosis     Clinical Impression:   1. COPD exacerbation ([address]    2. Hyperglycemia          Disposition:  home    PLAN:  1. Current Discharge Medication List      START taking these medications    Details   predniSONE (DELTASONE) 20 mg tablet Take 40 mg by mouth daily for 5 days. With Breakfast  Indications: worsening chronic obstructive pulmonary disease  Qty: 10 Tab, Refills: 0           2.    Follow-up Information     Follow up With Specialties Details Why Contact Info    Amanda Sullivan MD Nephrology In 1 week  [address] [place] [address] [address]      Adithya Suero DO Internal Medicine In 2 weeks  [address]  [facility] [address]  [address] [id number]          Return to ED if worse         Afshan Bosch MD  PGY-2 Emergency Medicine

## 2019-11-22 NOTE — TELEPHONE ENCOUNTER
Called and spoke with pt. I logged into the computer and saw that she was discharged after receiving IV solumedrol 125 mg and nebs and IV 60 mg of lasix. She said her breathing is doing better but she is still having a lot of swelling. She changed her infusion site while in the ER and gave a dose of 22 units for her sugar of 473 on her cmp and her sugar was down to 295 by the time she was discharged and was down to 79 while on the phone with me. She plans on filling the prednisone 20 mg tabs to take 2 tabs daily x 5 days starting tomorrow that was given by the ER. I advised her to run a temporary basal for 15 hours after taking her dose of prednisone of 130% each day while on the prednisone and if her sugar is over 300, she can use 140%. I told her my partner, Dr. Geraldene Hamman, is covering for me while I'm away until Friday 11/29/19. she voiced understanding of this plan.

## 2019-11-22 NOTE — ED NOTES
Pt. Presents to ED today for complaints of increasing SOB and BLE swelling over the past week. Pt. Placed on 2L nasal cannula in triage. Pt. Alert and oriented x4. Pt. Placed in position of comfort with call bell in reach.

## 2019-12-03 RX ORDER — CARVEDILOL 6.25 MG/1
TABLET ORAL
Qty: 90 TAB | Refills: 3 | Status: SHIPPED | OUTPATIENT
Start: 2019-12-03 | End: 2019-12-09 | Stop reason: SDUPTHER

## 2019-12-09 RX ORDER — CARVEDILOL 6.25 MG/1
6.25 TABLET ORAL DAILY
Qty: 90 TAB | Refills: 3 | Status: SHIPPED | OUTPATIENT
Start: 2019-12-09 | End: 2019-12-12 | Stop reason: SDUPTHER

## 2019-12-12 RX ORDER — CARVEDILOL 12.5 MG/1
TABLET ORAL
Qty: 13 TAB | Refills: 10 | OUTPATIENT
Start: 2019-12-12

## 2019-12-12 RX ORDER — CARVEDILOL 6.25 MG/1
6.25 TABLET ORAL DAILY
Qty: 90 TAB | Refills: 3 | Status: SHIPPED | OUTPATIENT
Start: 2019-12-12 | End: 2021-02-27 | Stop reason: SDUPTHER

## 2019-12-17 ENCOUNTER — OFFICE VISIT (OUTPATIENT)
Dept: ENDOCRINOLOGY | Age: 49
End: 2019-12-17

## 2019-12-17 VITALS
HEIGHT: 64 IN | OXYGEN SATURATION: 96 % | HEART RATE: 62 BPM | RESPIRATION RATE: 16 BRPM | SYSTOLIC BLOOD PRESSURE: 102 MMHG | WEIGHT: 220 LBS | DIASTOLIC BLOOD PRESSURE: 55 MMHG | BODY MASS INDEX: 37.56 KG/M2

## 2019-12-17 DIAGNOSIS — R79.89 ELEVATED LFTS: ICD-10-CM

## 2019-12-17 DIAGNOSIS — E78.5 HYPERLIPIDEMIA LDL GOAL <100: ICD-10-CM

## 2019-12-17 DIAGNOSIS — I10 ESSENTIAL HYPERTENSION: ICD-10-CM

## 2019-12-17 PROBLEM — N18.30 CKD (CHRONIC KIDNEY DISEASE) STAGE 3, GFR 30-59 ML/MIN (HCC): Status: ACTIVE | Noted: 2019-12-17

## 2019-12-17 LAB — HBA1C MFR BLD HPLC: 9.5 %

## 2019-12-17 RX ORDER — FUROSEMIDE 40 MG/1
80 TABLET ORAL 2 TIMES DAILY
COMMUNITY
End: 2020-07-26 | Stop reason: ALTCHOICE

## 2019-12-17 NOTE — PROGRESS NOTES
Lab Results   Component Value Date/Time    Hemoglobin A1c 9.3 (H) 07/11/2019 02:12 AM    Hemoglobin A1c 10.0 (H) 01/25/2019 09:21 AM    Hemoglobin A1c 9.2 (H) 08/23/2018 11:00 AM    Hemoglobin A1c, External 10.6 09/18/2019

## 2019-12-17 NOTE — PATIENT INSTRUCTIONS
1) Please call to make an appointment with the eye doctor sometime in the next few months. You last saw Dr. Cota Median [phone]. 
 
2) Your A1c is 9.5% down from 10.8%. 3) Current settings are as follows: 
- basal: 12a: 1.55, 4:30a: 1.65, 10p: 1.55 
- Carb ratio: 10 
- sensitivity: 20 
- target: 12a: 130-150, 6a: 100-130, 10p: 130-150 
- active insulin time: 3 hours I started your morning basal at 4:30a to see if this will bring your fasting sugar between . If after a week, you are still waking up over 140, then change the 4:30a basal to 1.7. 
 
4) I will send you a message through [employer] with your lab results.

## 2019-12-17 NOTE — PROGRESS NOTES
Chief Complaint   Patient presents with    Diabetes     Pharamcy verified: [facility]     History of Present Illness: Saida Garland is a 51 y.o. female here for follow up of diabetes. Weight up 7 lbs since last visit in 9/19 but is down 16 lbs from ER visit on 11/22/19 when she received IV lasix and steroids. Took the prednisone for about 3 days and even though she ran a temp basal of 130% was still having sugars in the 400-500s so she stopped and sugars came down. Has stopped using the freestyle ellen several months ago as it got to be too expensive to pay for this and the pump supplies. Will be starting a Medicare Part D plan in January. Testing 8-10 times per day. Current settings are as follows:  - basal: 12a: 1.55, 6a: 1.65, 10p: 1.5  - Carb ratio: 10  - sensitivity: 20  - target: 12a: 130-150, 6a: 100-130, 10p: 130-150  - active insulin time: 3 hours    Fasting sugar was 140 this morning but most are over 130 and usually in the 150-170s with some up to the 190s but not usually over 200. Has wide fluctuations from the 40s to 400s. At times has had lows where she bolused and then did not end up eating the whole meal so has started trying to bolus during the meal or after the meal.  She did go back to Dr. Terrilyn Saint for kidney care. She is not currently on losartan and is just on coreg for blood pressure. Was put back on lasix and is taking 80 mg bid and her swelling has been better on this and her ankles are less swollen and has lost weight on this. Has been wearing compression hose everyday to help with swelling. States she has not been taking the atorvastatin as she felt she was on too many pills and just restarted yesterday prior to this visit. Current Outpatient Medications   Medication Sig    furosemide (LASIX) 40 mg tablet Take 80 mg by mouth two (2) times a day.  carvedilol (COREG) 6.25 mg tablet Take 1 Tab by mouth daily.  Delete the 12.5 mg dose from profile    albuterol (PROVENTIL HFA, VENTOLIN HFA, PROAIR HFA) 90 mcg/actuation inhaler Take 1-2 Puffs by inhalation every four (4) hours as needed for Wheezing or Shortness of Breath.  atorvastatin (LIPITOR) 40 mg tablet TAKE ONE TABLET BY MOUTH DAILY    Nebulizer & Compressor machine Use q4 hours as needed for wheezing    albuterol-ipratropium (DUO-NEB) 2.5 mg-0.5 mg/3 ml nebu 3 mL by Nebulization route every four (4) hours as needed (wheezing).  esomeprazole (NEXIUM) 40 mg capsule TAKE ONE CAPSULE BY MOUTH DAILY    gabapentin (NEURONTIN) 300 mg capsule TAKE TWO CAPSULES BY MOUTH THREE TIMES A DAY    fluticasone propionate (FLONASE) 50 mcg/actuation nasal spray SPRAY TWO SPRAYS IN EACH NOSTRIL ONCE DAILY    umeclidinium-vilanterol (ANORO ELLIPTA) 62.5-25 mcg/actuation inhaler Take 1 Puff by inhalation daily.  insulin pump (PATIENT SUPPLIED) misc by SubCUTAneous route as needed.  HYDROcodone-acetaminophen (NORCO)  mg tablet Take 1 Tab by mouth.  LORazepam (ATIVAN) 2 mg tablet Take 2 mg by mouth four (4) times daily.  citalopram (CELEXA) 20 mg tablet Take 40 mg by mouth daily.  traZODone (DESYREL) 50 mg tablet Take 100 mg by mouth nightly.  insulin lispro (HUMALOG) 100 unit/mL injection Use as directed for insulin pump. Max 100 units per day--BILL MEDICARE PART B--DX E10.65--REPLACES NOVOLOG    CONTOUR NEXT TEST STRIPS strip Use as directed to test up to 8 x per day,  Uncontrolled Type 1 diabetes with Neuropathy (HCC) E10.40, E10.65    Insulin Syringe-Needle U-100 0.5 mL 31 gauge x 5/16\" syrg Use as directed up to 4 times daily    glucagon (GLUCAGON EMERGENCY KIT, HUMAN,) 1 mg injection USE A DIRECTED    levonorgestrel (MIRENA) 20 mcg/24 hr (5 years) IUD 1 Device by IntraUTERine route once.  ARIPiprazole (ABILIFY) 30 mg tablet Take 30 mg by mouth daily.  fentaNYL (DURAGESIC) 100 mcg/hr PATCH 1 Patch by TransDERmal route every seventy-two (72) hours.     Insulin Needles, Disposable, (BD ULTRA-FINE MINI PEN NEEDLE) 31 gauge x 3/16\" ndle Use as directed up to 8 times daily    FREESTYLE MARIELA SENSOR kit Use 1 sensor every 10 days as directed     No current facility-administered medications for this visit. Allergies   Allergen Reactions    Metolazone Other (comments)     Caused hyponatremia    Zocor [Simvastatin] Myalgia    Lisinopril Cough     Review of Systems:  - Eyes: no blurry vision or double vision  - Cardiovascular: no chest pain  - Respiratory: no shortness of breath  - Musculoskeletal: no myalgias  - Neurological: no numbness/tingling in extremities    Physical Examination:  Blood pressure 102/55, pulse 62, resp. rate 16, height 5' 4\" (1.626 m), weight 220 lb (99.8 kg), SpO2 96 %. 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): Most recent Hgb A1c was 9.5% in 12/19 down from 10.6% in 9/19 up from 9.3% in 7/19 down from 10.1% in 5/19 up from 10% in 1/19 up from 9.2% in 10/18 up from 9% in 7/18 up from 8.2% in 4/18 down from 8.8% in 11/17 up from 7.4% in 9/17 down from 9.4% in 2/17 up from 9.1% in 11/16 down from 10% in 3/16 up from 9.4% in 12/15 up from 9.2% in 9/15 down from 9.7% in 5/15 down from 10.3% in 1/15 up from 8.9% in 10/14 down from 11.1% in 4/14 up from 9.2% in 1/14 up from 8.6% in March 2013 down from 8.9% in November down from 9.7% in Aug 2012 up from 9.2% in October 2011 up from 8.7% in May down from 9.7% in March up from 9.1% in July 2010 down from 10.2% in January down from 13.7% in October 2009.   Her blood sugars are improving but started her morning basal rate earlier to help keep her fasting sugars down. - cont current pump settings aside from change below  - check bs 8 times daily due to fluctuating blood sugars  - foot exam done 9/19  - microalbumin was 69 in 7/10 up to 392 in March 2011 and 621 in May down to 216 in October 2011 and 37.3 in 4/14 and up to 183 in 12/15 and 300 in 9/16, down to 85 in 4/18 and 10.2 in 9/19  - optho UTD 3/18--due now  - check bmp and microalbumin today       2) HTN NOS (401.9): Blood pressure was at goal < 140/90 but not taking losartan currently so will repeat microalbumin to see if she should go back on this in place of coreg   - cont coreg 6.25 mg daily  - was previously on losartan 25 mg daily  - cont lasix 80 mg bid      3) Hyperlipidemia (272.4): Given DM, goal LDL is < 100, non-HDL < 130, and TGs < 150. LDL was 146 in January 2010, down to 124 in July and 93 in March 2011 with taking 5 mg of crestor daily. Her crestor was changed to pravastatin by Dr. Floridalma Chavez because of cost in Feb 2012. LDL 93 in 8/12 but her HDL was high at 138 due to ETOH intake. LDL down to 39 in 11/12 but up to 102 in 3/13. Off pravastatin at this time and previously was on 10 mg daily.  in 1/14. Up to 167 in 4/14 so started lovastatin 20 mg daily but she couldn't afford this. She has been started on crestor 40 mg daily by crossover clinic and LDL 64 in 10/14 and 55 in 1/15. Was 37 in 3/16 so dose decreased to 20 mg daily and LDL 23 in 2/17. Was changed to lipitor 1/2 of 80 mg daily when clinic couldn't get crestor any longer and LDL 37 in 9/17. This was stopped during her hospital stay in 11/17 but was restarted by Dr. Kayley Reyna in 12/17 and 42 in 4/18. Up to 94 in 8/18.  Down to 79 in 1/19 and 88 in 9/19  - cont lipitor 40 mg daily  - check lipids today    4) Elevated LFTs (790.6): I told her previously that her LFTs were elevated likely due to ETOH intake so I advised her to cut back on this and her repeat LFTs were normal in 3/13 and 1/14 and 4/14 and 10/14 and 1/15 and 12/15 and 3/16. AST up to 76 in 2/17 but back to normal in 5/17 and 11/17 and 8/18 and 1/19 so will follow this. 5) Borderline abnormal TFT: TSH 2.1 in 12/11 but up to 4.01 in 11/12 and 4.75 in 11/17 during 2 hospital stays. Up to 5.45 in 1/19 with Dr. Darling Haynes. Repeat TSH 1.24 and FT4 0.95 and TPO ab 21 in 2/19 so held on any treatment. TSH up to 3.36 in 9/19  - check TSH and free T4 today    We spent 40 minutes of face to face time together and > 50% of the time was spent in counseling regarding management of all the conditions above. Patient Instructions   1) Please call to make an appointment with the eye doctor sometime in the next few months. You last saw Dr. Branden Yuan [phone].    2) Your A1c is 9.5% down from 10.8%. 3) Current settings are as follows:  - basal: 12a: 1.55, 4:30a: 1.65, 10p: 1.55  - Carb ratio: 10  - sensitivity: 20  - target: 12a: 130-150, 6a: 100-130, 10p: 130-150  - active insulin time: 3 hours    I started your morning basal at 4:30a to see if this will bring your fasting sugar between . If after a week, you are still waking up over 140, then change the 4:30a basal to 1.7.    4) I will send you a message through [employer] with your lab results.             Follow-up and Dispositions    · Return for 3/24/20 at 9:30am.               Copy sent to:  Dr. Joseph Haynes via [facility]    Lab follow up: 12/21/19  Component      Latest Ref Rng & Units 12/17/2019 12/17/2019 12/17/2019 12/17/2019          10:05 AM 10:05 AM 10:05 AM 10:05 AM   Glucose      65 - 99 mg/dL  202 (H)     BUN      6 - 24 mg/dL  22     Creatinine      0.57 - 1.00 mg/dL  1.49 (H)     GFR est non-AA      >59 mL/min/1.73  41 (L)     GFR est AA      >59 mL/min/1.73  48 (L)     BUN/Creatinine ratio      9 - 23  15     Sodium      134 - 144 mmol/L  137     Potassium      3.5 - 5.2 mmol/L  4.2     Chloride      96 - 106 mmol/L  95 (L) CO2      20 - 29 mmol/L  26     Calcium      8.7 - 10.2 mg/dL  9.0     Cholesterol, total      100 - 199 mg/dL 143      Triglyceride      0 - 149 mg/dL 143      HDL Cholesterol      >39 mg/dL 36 (L)      VLDL, calculated      5 - 40 mg/dL 29      LDL, calculated      0 - 99 mg/dL 78      TSH      0.450 - 4.500 uIU/mL    2.610   T4, Free      0.82 - 1.77 ng/dL   1.18      Component      Latest Ref Rng & Units 12/17/2019          10:05 AM   Creatinine, urine      Not Estab. mg/dL 218.6   Microalbumin, urine      Not Estab. ug/mL 8.4   Microalbumin/Creat. Ratio      0.0 - 30.0 mg/g creat 3.8     Sent her the following message through [employer]:    Total Cholesterol is the total number of cholesterol particles in your blood. Goal is less than 200. Triglycerides are the short term fats in your blood. Goal is less than 150. HDL is the good cholesterol in your blood. Goal is more than 50 if you are a woman and 40 if you are a man. LDL is the bad cholesterol in your blood. Goal is less than 100 unless you have heart disease and then goal is under 70. Continue to follow a low cholesterol diet. Try to limit the amount of fried foods, fatty foods, butter, gravy, red meat, ice cream, cheese, and eggs in your diet, which are all high in cholesterol.  -------------------------------------------------------------------------------------------------------------------  BUN and creatinine are markers of kidney function. Your creatinine is abnormal but stable  -------------------------------------------------------------------------------------------------------------------  Microalbumin/creatinine ratio is a marker of the amount of protein in your urine. Goal is less than 30. Your value is normal and down from 85 last year.  This indicates that your kidneys are being less affected by your uncontrolled diabetes and/or blood pressure.  -------------------------------------------------------------------------------------------------------------------  TSH is a thyroid test.  Your level is normal and improved from 3.36 in 9/19 so you don't have any problem with your thyroid function at this time that needs further evaluation or treatment.

## 2019-12-18 LAB
ALBUMIN/CREAT UR: 3.8 MG/G CREAT (ref 0–30)
BUN SERPL-MCNC: 22 MG/DL (ref 6–24)
BUN/CREAT SERPL: 15 (ref 9–23)
CALCIUM SERPL-MCNC: 9 MG/DL (ref 8.7–10.2)
CHLORIDE SERPL-SCNC: 95 MMOL/L (ref 96–106)
CHOLEST SERPL-MCNC: 143 MG/DL (ref 100–199)
CO2 SERPL-SCNC: 26 MMOL/L (ref 20–29)
CREAT SERPL-MCNC: 1.49 MG/DL (ref 0.57–1)
CREAT UR-MCNC: 218.6 MG/DL
GLUCOSE SERPL-MCNC: 202 MG/DL (ref 65–99)
HDLC SERPL-MCNC: 36 MG/DL
INTERPRETATION, 910389: NORMAL
INTERPRETATION: NORMAL
LDLC SERPL CALC-MCNC: 78 MG/DL (ref 0–99)
MICROALBUMIN UR-MCNC: 8.4 UG/ML
PDF IMAGE, 910387: NORMAL
POTASSIUM SERPL-SCNC: 4.2 MMOL/L (ref 3.5–5.2)
SODIUM SERPL-SCNC: 137 MMOL/L (ref 134–144)
T4 FREE SERPL-MCNC: 1.18 NG/DL (ref 0.82–1.77)
TRIGL SERPL-MCNC: 143 MG/DL (ref 0–149)
TSH SERPL DL<=0.005 MIU/L-ACNC: 2.61 UIU/ML (ref 0.45–4.5)
VLDLC SERPL CALC-MCNC: 29 MG/DL (ref 5–40)

## 2019-12-29 ENCOUNTER — TELEPHONE (OUTPATIENT)
Dept: ENDOCRINOLOGY | Age: 49
End: 2019-12-29

## 2019-12-29 RX ORDER — CARVEDILOL 12.5 MG/1
TABLET ORAL
Qty: 13 TAB | Refills: 10 | OUTPATIENT
Start: 2019-12-29

## 2019-12-30 NOTE — TELEPHONE ENCOUNTER
Please call [facility] to have them stop sending refill requests for the carvedilol 12.5 mg tabs. I sent the 6.25 mg tabs on 12/12/19 and wrote to delete the 12.5 mg dose from her profile.

## 2019-12-30 NOTE — TELEPHONE ENCOUNTER
Robert Ambrosio with [facility] was notified and voiced understanding. The Carvedilol 12.5 will be deleted from her profile per Dr. Mumtaz Asencio.

## 2020-01-16 ENCOUNTER — TELEPHONE (OUTPATIENT)
Dept: ENDOCRINOLOGY | Age: 50
End: 2020-01-16

## 2020-01-16 NOTE — TELEPHONE ENCOUNTER
----- Message from Nathalie Apgar sent at 2020  2:27 PM EST -----  Regarding: FW: Dr Eneida Spring    ----- Message -----  From: Shari Muller  Sent: 2020   1:56 PM EST  To: [facility]  Subject: Dr Eneida Spring                             General Message/Vendor Calls    Caller's first and last name: [facility] [employer]      Reason for call: [facility] is indicating that the order for insulin pump and testing supplies have been rejected by Medicare, due to the pt's  and Name being hand written on the fax that was returned. They are requesting that to be re-faxed with the  and Name dated and initialed individually.       Callback required yes/no and why:      Best contact number(s):[phone]      Details to clarify the request:      Amirah Bullard

## 2020-01-16 NOTE — TELEPHONE ENCOUNTER
I was not aware that [facility] had sent a request for a new form to be filled out on 12/20/19 as this was scanned to her chart but never given to me. [facility] refaxed the request today so I completed the updated order and will fax back today.

## 2020-01-17 RX ORDER — GABAPENTIN 300 MG/1
CAPSULE ORAL
Qty: 180 CAP | Refills: 5 | Status: SHIPPED | OUTPATIENT
Start: 2020-01-17 | End: 2020-07-18

## 2020-01-30 ENCOUNTER — TELEPHONE (OUTPATIENT)
Dept: ENDOCRINOLOGY | Age: 50
End: 2020-01-30

## 2020-01-30 RX ORDER — CARVEDILOL 12.5 MG/1
TABLET ORAL
Qty: 13 TAB | Refills: 10 | OUTPATIENT
Start: 2020-01-30

## 2020-01-30 NOTE — TELEPHONE ENCOUNTER
Please call the pharmacy and have them stop sending refill requests for the coreg 12.5 mg tabs. I have already discontinued this and changed her to the 6.25 mg tabs and wrote \"delete the 12.5 mg dose from profile\" on the prescription that was sent on 12/12/19.

## 2020-01-30 NOTE — TELEPHONE ENCOUNTER
Aiden Dent pharmacist was notified of the message noted per Dr. Deidre Schaffer and voiced understanding.

## 2020-02-03 ENCOUNTER — TELEPHONE (OUTPATIENT)
Dept: ENDOCRINOLOGY | Age: 50
End: 2020-02-03

## 2020-02-03 NOTE — TELEPHONE ENCOUNTER
Per Dr. Negin Dang patient was notified that he responded to her message through [address] [place] [address] [address] regarding her pump. Patient voiced understanding.

## 2020-02-03 NOTE — TELEPHONE ENCOUNTER
----- Message from Karla Hobbs sent at 2/3/2020  3:52 PM EST -----  Regarding: Dr. Latia Ambrosio Message/Vendor Calls    Caller's first and last name:      Reason for call: Pt is requesting for the nurse to give her a call to about her insulin intake       Callback required yes/no and why:Y      Best contact number(s):[phone]      Details to clarify the request:      Karla Hobbs

## 2020-02-06 ENCOUNTER — TELEPHONE (OUTPATIENT)
Dept: ENDOCRINOLOGY | Age: 50
End: 2020-02-06

## 2020-02-06 NOTE — TELEPHONE ENCOUNTER
Called to help patient set up her pump over the phone but she was unable to do this. I asked if she can come tomorrow at 11:30am and she will do so. She will give herself 35 units of Lantus tonight. Juju,  Please put her on th books for 11:30am on Friday 2/7/20. Thanks.

## 2020-02-06 NOTE — TELEPHONE ENCOUNTER
----- Message from Harper Meehan sent at 2020  5:37 PM EST -----  Regarding: RE: Non-Urgent Medical Question  Contact: [phone]  I need help  ----- Message -----  From: Mariangel Magdaleno MD  Sent: 2020  8:38 AM EST  To: Harper Meehan  Subject: RE: Non-Urgent Medical Question  These are your current pump settings:    - basal: 12a: 1.55, 4:30a: 1.65, 10p: 1.55  - Carb ratio: 10  - sensitivity: 20  - target: 12a: 130-150, 6a: 100-130, 10p: 130-150  - active insulin time: 3 hours    I don't have the ability to walk through all the steps over the portal.  Just go into the basal set up to plug in these basal rates and bolus set up to plug in the rest of the information. If you need me to sit down with you personally, I can try to do this tomorrow. Just be in touch if this is needed. ----- Message -----     From: Harper Meehan     Sent: 2020  5:09 AM EST       To: Mariangel Magdaleno MD  Subject: RE: Non-Urgent Medical Question    Horace HERNANDEZ  My dad  from complications of diabetes. Can you please help me set my pump up. Im a bit scatter brain n just sad. If you cld just reply with steps to set up everything again i sure wld appreciate it.   Priti Weinstein  Thanks

## 2020-02-07 ENCOUNTER — OFFICE VISIT (OUTPATIENT)
Dept: ENDOCRINOLOGY | Age: 50
End: 2020-02-07

## 2020-02-07 VITALS
HEART RATE: 83 BPM | WEIGHT: 226.2 LBS | BODY MASS INDEX: 38.62 KG/M2 | DIASTOLIC BLOOD PRESSURE: 68 MMHG | SYSTOLIC BLOOD PRESSURE: 132 MMHG | HEIGHT: 64 IN

## 2020-02-07 DIAGNOSIS — R94.6 BORDERLINE ABNORMAL THYROID FUNCTION TEST: ICD-10-CM

## 2020-02-07 DIAGNOSIS — E78.5 HYPERLIPIDEMIA LDL GOAL <100: ICD-10-CM

## 2020-02-07 DIAGNOSIS — I10 ESSENTIAL HYPERTENSION: ICD-10-CM

## 2020-02-07 DIAGNOSIS — R79.89 ELEVATED LFTS: ICD-10-CM

## 2020-02-07 RX ORDER — INSULIN ASPART 100 [IU]/ML
INJECTION, SOLUTION INTRAVENOUS; SUBCUTANEOUS
COMMUNITY
Start: 2020-02-02 | End: 2020-03-23

## 2020-02-07 NOTE — PROGRESS NOTES
Chief Complaint   Patient presents with    Diabetes     PCP and Pharmacy verified     History of Present Illness: Mike Bunn is a 51 y.o. female here for follow up of diabetes. Weight up 6 lbs since last visit in 12/19. Current settings are as follows:  - basal: 12a: 1.55, 4:30a: 1.65, 10p: 1.55  - Carb ratio: 10  - sensitivity: 20  - target: 12a: 130-150, 6a: 100-130, 10p: 130-150  - active insulin time: 3 hours    Her medtronic pump broke this week so she has been using lantus and novolog and was unable to program her settings over the phone so I had her come in today to set up her pump. Her readings have been doing better with fasting sugars in the  range. Current Outpatient Medications   Medication Sig    NOVOLOG U-100 INSULIN ASPART 100 unit/mL injection     gabapentin (NEURONTIN) 300 mg capsule TAKE TWO CAPSULES BY MOUTH THREE TIMES A DAY    furosemide (LASIX) 40 mg tablet Take 80 mg by mouth two (2) times a day.  carvedilol (COREG) 6.25 mg tablet Take 1 Tab by mouth daily. Delete the 12.5 mg dose from profile    atorvastatin (LIPITOR) 40 mg tablet TAKE ONE TABLET BY MOUTH DAILY    esomeprazole (NEXIUM) 40 mg capsule TAKE ONE CAPSULE BY MOUTH DAILY    fluticasone propionate (FLONASE) 50 mcg/actuation nasal spray SPRAY TWO SPRAYS IN EACH NOSTRIL ONCE DAILY    umeclidinium-vilanterol (ANORO ELLIPTA) 62.5-25 mcg/actuation inhaler Take 1 Puff by inhalation daily.  insulin pump (PATIENT SUPPLIED) misc by SubCUTAneous route as needed.  HYDROcodone-acetaminophen (NORCO)  mg tablet Take 1 Tab by mouth.  LORazepam (ATIVAN) 2 mg tablet Take 2 mg by mouth four (4) times daily.  citalopram (CELEXA) 20 mg tablet Take 40 mg by mouth daily.  traZODone (DESYREL) 50 mg tablet Take 100 mg by mouth nightly.     CONTOUR NEXT TEST STRIPS strip Use as directed to test up to 8 x per day,  Uncontrolled Type 1 diabetes with Neuropathy (HCC) E10.40, E10.65    Insulin Syringe-Needle U-100 0.5 mL 31 gauge x 5/16\" syrg Use as directed up to 4 times daily    Insulin Needles, Disposable, (BD ULTRA-FINE MINI PEN NEEDLE) 31 gauge x 3/16\" ndle Use as directed up to 8 times daily    levonorgestrel (MIRENA) 20 mcg/24 hr (5 years) IUD 1 Device by IntraUTERine route once.  ARIPiprazole (ABILIFY) 30 mg tablet Take 30 mg by mouth daily.  fentaNYL (DURAGESIC) 100 mcg/hr PATCH 1 Patch by TransDERmal route every seventy-two (72) hours.  albuterol (PROVENTIL HFA, VENTOLIN HFA, PROAIR HFA) 90 mcg/actuation inhaler Take 1-2 Puffs by inhalation every four (4) hours as needed for Wheezing or Shortness of Breath.  Nebulizer & Compressor machine Use q4 hours as needed for wheezing    albuterol-ipratropium (DUO-NEB) 2.5 mg-0.5 mg/3 ml nebu 3 mL by Nebulization route every four (4) hours as needed (wheezing).  insulin lispro (HUMALOG) 100 unit/mL injection Use as directed for insulin pump. Max 100 units per day--BILL MEDICARE PART B--DX E10.65--REPLACES NOVOLOG    glucagon (GLUCAGON EMERGENCY KIT, HUMAN,) 1 mg injection USE A DIRECTED    FREESTYLE MARIELA SENSOR kit Use 1 sensor every 10 days as directed     No current facility-administered medications for this visit. Allergies   Allergen Reactions    Metolazone Other (comments)     Caused hyponatremia    Zocor [Simvastatin] Myalgia    Lisinopril Cough     Review of Systems: Not asked today    Physical Examination:  Blood pressure 132/68, pulse 83, height 5' 4\" (1.626 m), weight 226 lb 3.2 oz (102.6 kg). 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):  Most recent Hgb A1c was 9.5% in 12/19 down from 10.6% in 9/19 up from 9.3% in 7/19 down from 10.1% in 5/19 up from 10% in 1/19 up from 9.2% in 10/18 up from 9% in 7/18 up from 8.2% in 4/18 down from 8.8% in 11/17 up from 7.4% in 9/17 down from 9.4% in 2/17 up from 9.1% in 11/16 down from 10% in 3/16 up from 9.4% in 12/15 up from 9.2% in 9/15 down from 9.7% in 5/15 down from 10.3% in 1/15 up from 8.9% in 10/14 down from 11.1% in 4/14 up from 9.2% in 1/14 up from 8.6% in March 2013 down from 8.9% in November down from 9.7% in Aug 2012 up from 9.2% in October 2011 up from 8.7% in May down from 9.7% in March up from 9.1% in July 2010 down from 10.2% in January down from 13.7% in October 2009. Her blood sugars are doing better on the higher fasting basal rate so did not make any changes today and simply reprogrammed her new pump with her old settings. We spent 15 minutes of face to face time together and > 50% of the time was spent in counseling regarding management of her diabetes and reprogramming her pump. - cont current pump settings aside from change below  - check bs 8 times daily due to fluctuating blood sugars  - foot exam done 9/19  - microalbumin was 69 in 7/10 up to 392 in March 2011 and 621 in May down to 216 in October 2011 and 37.3 in 4/14 and up to 183 in 12/15 and 300 in 9/16, down to 85 in 4/18 and 10.2 in 9/19 and 3.8 in 12/19  - optho UTD 3/18--due now  - check POC A1c at next visit       2) HTN NOS (401.9): Blood pressure was at goal < 140/90  - cont coreg 6.25 mg daily  - was previously on losartan 25 mg daily  - cont lasix 80 mg bid      3) Hyperlipidemia (272.4): Given DM, goal LDL is < 100, non-HDL < 130, and TGs < 150. LDL was 146 in January 2010, down to 124 in July and 93 in March 2011 with taking 5 mg of crestor daily. Her crestor was changed to pravastatin by Dr. Sonja oBrden because of cost in Feb 2012. LDL 93 in 8/12 but her HDL was high at 138 due to ETOH intake. LDL down to 39 in 11/12 but up to 102 in 3/13. Off pravastatin at this time and previously was on 10 mg daily.  in 1/14. Up to 167 in 4/14 so started lovastatin 20 mg daily but she couldn't afford this.   She has been started on crestor 40 mg daily by crossover clinic and LDL 64 in 10/14 and 55 in 1/15. Was 37 in 3/16 so dose decreased to 20 mg daily and LDL 23 in 2/17. Was changed to lipitor 1/2 of 80 mg daily when clinic couldn't get crestor any longer and LDL 37 in 9/17. This was stopped during her hospital stay in 11/17 but was restarted by Dr. Braeden Martinez in 12/17 and 42 in 4/18. Up to 94 in 8/18. Down to 79 in 1/19 and 88 in 9/19 and 78 in 12/19  - cont lipitor 40 mg daily      4) Elevated LFTs (790.6): I told her previously that her LFTs were elevated likely due to ETOH intake so I advised her to cut back on this and her repeat LFTs were normal in 3/13 and 1/14 and 4/14 and 10/14 and 1/15 and 12/15 and 3/16. AST up to 76 in 2/17 but back to normal in 5/17 and has been normal ever since so will follow this. 5) Borderline abnormal TFT: TSH 2.1 in 12/11 but up to 4.01 in 11/12 and 4.75 in 11/17 during 2 hospital stays. Up to 5.45 in 1/19 with Dr. Braeden Martinez. Repeat TSH 1.24 and FT4 0.95 and TPO ab 21 in 2/19 so held on any treatment. TSH up to 3.36 in 9/19 but down to 2.61 in 12/19  - check TSH in the fall 2020    Patient Instructions   1)  Current settings are as follows:  - basal: 12a: 1.55, 4:30a: 1.65, 10p: 1.55  - Carb ratio: 10  - sensitivity: 20  - target: 12a: 130-150, 6a: 100-130, 10p: 130-150  - active insulin time: 3 hours      Follow-up and Dispositions    · Return for as scheduled in March.                Copy sent to:  Dr. Marimar Martinez via [facility]

## 2020-02-07 NOTE — PATIENT INSTRUCTIONS
1)  Current settings are as follows:  - basal: 12a: 1.55, 4:30a: 1.65, 10p: 1.55  - Carb ratio: 10  - sensitivity: 20  - target: 12a: 130-150, 6a: 100-130, 10p: 130-150  - active insulin time: 3 hours

## 2020-02-09 ENCOUNTER — TELEPHONE (OUTPATIENT)
Dept: ENDOCRINOLOGY | Age: 50
End: 2020-02-09

## 2020-02-13 NOTE — PROGRESS NOTES
Chief Complaint   Patient presents with    Diabetes     pcp and pharmacy confirmed     History of Present Illness: Marjan Pham is a 53 y.o. female here for follow up of diabetes. Weight up 11 lbs since last visit in 4/18. I had sent a supply of novolog to the pharmacy but her pharmacy keeps filling the humalog at $3 so she has remained on this. She increased her lantus to 36 units daily about 2-3 weeks ago and has been following the correction scale but sometimes will go under 90 when correcting for a high suggesting this dose of lantus is too much. Dr. Ti Weber has been adjusting her diuretic regimen and currently is taking metolazone daily and lasix 2 tabs tid. Had repeat labs in his office last week that showed BUN/Cr of 29/1.35 and Mag 2.3 and Phos 4.7 and potassium 4.6. Dr. Tigre Arthur changed her to fentanyl patch yesterday for pelvic pain and this morning her pain was much better. His plan is to eventually do a hysterectomy but wants her medical conditions more stable. She has been checking her sugar 8 times per day and still has a lot of fluctuation in her levels between 54-\"hi\" with the average of 244 over the past 30 days. she has the following indications to begin treatment with freestyle ellen:  1) she has type 1 diabetes and is on an intensive insulin regimen with 4 injections of insulin per day  2) she tests her blood sugar up to 8 times per day and makes treatment decisions off her blood sugar readings and will make decisions off her sensor readings  3) she requires adjustments to her insulin doses settings based on her sensor readings  4) she will benefit from therapeutic continuous glucose monitoring and I recommend that she begin this  5) she is seen in my office every 3 months      Current Outpatient Prescriptions   Medication Sig    ARIPiprazole (ABILIFY) 30 mg tablet Take 30 mg by mouth daily.  LORazepam (ATIVAN) 1 mg tablet Take 1 mg by mouth three (3) times daily.     fentaNYL (DURAGESIC) 100 mcg/hr PATCH 1 Patch by TransDERmal route every seventy-two (72) hours.  carvedilol (COREG) 12.5 mg tablet Take 12.5 mg by mouth daily.  insulin glargine (LANTUS SOLOSTAR U-100 INSULIN) 100 unit/mL (3 mL) inpn 36 Units by SubCUTAneous route daily. Dose change 7/24/18--updated med list--did not send prescription to the pharmacy    albuterol (PROVENTIL HFA, VENTOLIN HFA, PROAIR HFA) 90 mcg/actuation inhaler Take 1 Puff by inhalation every four (4) hours as needed for Wheezing.  HYDROcodone-acetaminophen (NORCO)  mg tablet Take 1 Tab by mouth every eight (8) hours as needed.  acetaminophen (TYLENOL) 325 mg tablet Take 2 Tabs by mouth every six (6) hours as needed for Fever.  gabapentin (NEURONTIN) 300 mg capsule Take 2 Caps by mouth three (3) times daily.  Insulin Needles, Disposable, (BD INSULIN PEN NEEDLE UF MINI) 31 gauge x 3/16\" ndle Use as directed up to 4 times daily    furosemide (LASIX) 40 mg tablet 2 tablets tid daily--Dose change 7/24/18--updated med list--did not send prescription to the pharmacy    potassium chloride (K-DUR, KLOR-CON) 20 mEq tablet Take 20 mEq by mouth daily.  metOLazone (ZAROXOLYN) 2.5 mg tablet Take 2.5 mg by mouth daily.  glucagon (GLUCAGON EMERGENCY KIT, HUMAN,) 1 mg injection Use as directed    magnesium oxide (MAG-OX) 400 mg tablet Take 800 mg by mouth two (2) times a day.  ONE TOUCH DELICA 33 gauge misc Test 8 times daily--DX E10.65    atorvastatin (LIPITOR) 40 mg tablet Take 1 Tab by mouth daily.  venlafaxine-SR (EFFEXOR XR) 150 mg capsule Take 150 mg by mouth two (2) times a day.  insulin lispro (HUMALOG KWIKPEN) 100 unit/mL kwikpen 1:15 for carbs and 1:30 > 150 for correction during the day and > 180 at bedtime. Max 50 units per day     No current facility-administered medications for this visit.       Allergies   Allergen Reactions    Zocor [Simvastatin] Myalgia    Lisinopril Cough     Review of Systems:  - Eyes: no blurry vision or double vision  - Cardiovascular: no chest pain  - Respiratory: no shortness of breath  - Musculoskeletal: no myalgias  - Neurological: no numbness/tingling in extremities    Physical Examination:  Blood pressure 126/68, pulse 99, height 5' 5\" (1.651 m), weight 199 lb 12.8 oz (90.6 kg). 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 Her blood sugars are improving but still at risk for hyper and hypoglycemia due to fluctuations in her weight from fluid. I feel she will benefit from CGM and started her on the freestyle ellen in the office today. We spent 40 minutes of face to face time together and > 50% of the time was spent in counseling regarding management of her diabetes and starting CGM.   - decrease lantus to 34 units in the morning   - cont humalog 1:15 for carbs and take 1:30 for correction > 150 during the day and > 180 at night  - check bs up to 8 times daily due to fluctuating blood sugars  - foot exam done 9/17  - microalbumin was 69 in 7/10 up to 392 in March 2011 and 621 in May down to 216 in October 2011 and 37.3 in 4/14 and up to 183 in 12/15 and 300 in 9/16, down to 85 in 4/18  - optho UTD 11/17  - check cmp today    2) HTN NOS (401.9): Blood pressure was at goal < 140/90. She is currently off losartan since her hospital in 11/17 stay likely due to DEBORAH. - cont coreg 12.5 mg daily  - cont lasix 80 tid per Dr. Sterling Mcpherson  - cont metolazone 2.5 mg daily    3) Hyperlipidemia (272.4): Given DM, goal LDL is < 100, non-HDL < 130, and TGs < 150. LDL was 146 in January 2010, down to 124 in July and 93 in March 2011 with taking 5 mg of crestor daily. Her crestor was changed to pravastatin by Dr. Luis Willard because of cost in Feb 2012. LDL 93 in 8/12 but her HDL was high at 138 due to ETOH intake. LDL down to 39 in 11/12 but up to 102 in 3/13. Off pravastatin at this time and previously was on 10 mg daily.  in 1/14. Up to 167 in 4/14 so started lovastatin 20 mg daily but she couldn't afford this. She has been started on crestor 40 mg daily by crossover clinic and LDL 64 in 10/14 and 55 in 1/15. Was 37 in 3/16 so dose decreased to 20 mg daily and LDL 23 in 2/17. Was changed to lipitor 1/2 of 80 mg daily when clinic couldn't get crestor any longer and LDL 37 in 9/17. This was stopped during her hospital stay in 11/17 but was restarted by Dr. Irina Yan in 12/17 and 42 in 4/18  - cont lipitor 40 mg daily  - check lipids in the future    4) Elevated LFTs (790.6): I told her previously that her LFTs were elevated likely due to ETOH intake so I advised her to cut back on this and her repeat LFTs were normal in 3/13 and 1/14 and 4/14 and 10/14 and 1/15 and 12/15 and 3/16. AST up to 76 in 2/17 but back to normal in 5/17 and 11/17 so will follow this. Patient Instructions   1) I learned that with Medicare patients we may need to go through a durable medical equipment (DME) supplier to get the sensors. You can contact Fatemeh Maher Dr at [phone] or Ghazala Campo [phone] ext [phone] to have them fax me a form to get the sensors if they aren't covered at [facility].     2) Your A1c went from 8.2% to 9% so hopefully the sensor will help you have better control with less fluctuation. 3) You can try the freestyle ellen to help monitor your blood sugar more closely. You will apply a new sensor every 10 days and be sure to try and wave the reader over the sensor 3 times a day if possible to know how your sugar is running over the previous 8 hours and the sensor will store up to 90 days of data. Try to check up to 6 times daily before and 2 hours after each meal so you can see the effect of food on your sugar. 4) Decrease the lantus to 34 units starting tomorrow. 5) I will send you a message with the rest of your labs. Follow-up Disposition:  Return for 10/30/18 at 9:30am.    Copy sent to:  Dr. Jony Dong via [facility]  Dr. [facility]    Lab follow up: 7/25/18  Component      Latest Ref Rng & Units 7/24/2018          10:28 AM   Glucose      65 - 99 mg/dL 129 (H)   BUN      6 - 24 mg/dL 15   Creatinine      0.57 - 1.00 mg/dL 1.22 (H)   GFR est non-AA      >59 mL/min/1.73 53 (L)   GFR est AA      >59 mL/min/1.73 61   BUN/Creatinine ratio      9 - 23 12   Sodium      134 - 144 mmol/L 135   Potassium      3.5 - 5.2 mmol/L 4.6   Chloride      96 - 106 mmol/L 92 (L)   CO2      20 - 29 mmol/L 24   Calcium      8.7 - 10.2 mg/dL 9.5   Protein, total      6.0 - 8.5 g/dL 7.2   Albumin      3.5 - 5.5 g/dL 4.6   GLOBULIN, TOTAL      1.5 - 4.5 g/dL 2.6   A-G Ratio      1.2 - 2.2 1.8   Bilirubin, total      0.0 - 1.2 mg/dL <0.2   Alk. phosphatase      39 - 117 IU/L 95   AST      0 - 40 IU/L 29   ALT (SGPT)      0 - 32 IU/L 28     Sent her the following message through [email or web address]:  BUN and creatinine are markers of kidney function. Your BUN is back to normal at 15 and down from 29 with Dr. Ronel Bee.   Your creatinine is abnormal at 1.22 but down from 1.35 so it appears you are less dehydrated this week than last and can stay on your current doses of fluid pills for now.  -------------------------------------------------------------------------------------------------------------------  ALT and AST are markers of liver function.   Your values are normal. Billing Type: Third-Party Bill

## 2020-02-17 RX ORDER — CARVEDILOL 12.5 MG/1
TABLET ORAL
Qty: 13 TAB | Refills: 10 | OUTPATIENT
Start: 2020-02-17

## 2020-03-17 ENCOUNTER — HOSPITAL ENCOUNTER (OUTPATIENT)
Dept: NON INVASIVE DIAGNOSTICS | Age: 50
Discharge: HOME | End: 2020-03-17
Attending: INTERNAL MEDICINE
Payer: MEDICARE

## 2020-03-17 ENCOUNTER — TELEPHONE (OUTPATIENT)
Dept: ENDOCRINOLOGY | Age: 50
End: 2020-03-17

## 2020-03-17 ENCOUNTER — HOSPITAL ENCOUNTER (OUTPATIENT)
Dept: CT IMAGING | Age: 50
Discharge: HOME | End: 2020-03-17
Attending: INTERNAL MEDICINE
Payer: MEDICARE

## 2020-03-17 ENCOUNTER — HOSPITAL ENCOUNTER (EMERGENCY)
Age: 50
Discharge: HOME | End: 2020-03-17
Attending: EMERGENCY MEDICINE
Payer: MEDICARE

## 2020-03-17 VITALS
HEIGHT: 65 IN | TEMPERATURE: 98 F | DIASTOLIC BLOOD PRESSURE: 79 MMHG | BODY MASS INDEX: 33.32 KG/M2 | OXYGEN SATURATION: 93 % | RESPIRATION RATE: 20 BRPM | SYSTOLIC BLOOD PRESSURE: 143 MMHG | WEIGHT: 200 LBS | HEART RATE: 92 BPM

## 2020-03-17 DIAGNOSIS — R09.02 HYPOXIA: ICD-10-CM

## 2020-03-17 DIAGNOSIS — R05.9 COUGH: ICD-10-CM

## 2020-03-17 DIAGNOSIS — F17.200 SMOKER: ICD-10-CM

## 2020-03-17 DIAGNOSIS — J41.1 MUCOPURULENT CHRONIC BRONCHITIS (HCC): Primary | ICD-10-CM

## 2020-03-17 DIAGNOSIS — R06.02 SHORTNESS OF BREATH: ICD-10-CM

## 2020-03-17 DIAGNOSIS — R06.00 DYSPNEA, UNSPECIFIED TYPE: ICD-10-CM

## 2020-03-17 LAB
AV VELOCITY RATIO: 0.63
CREAT BLD-MCNC: 1.1 MG/DL (ref 0.6–1.3)
ECHO AV AREA PEAK VELOCITY: 2.1 CM2
ECHO AV AREA/BSA PEAK VELOCITY: 1 CM2/M2
ECHO AV CUSP MM: 1.91 CM
ECHO AV PEAK GRADIENT: 12.2 MMHG
ECHO AV PEAK VELOCITY: 174.58 CM/S
ECHO EST RA PRESSURE: 10 MMHG
ECHO LA AREA 4C: 24.7 CM2
ECHO LA MAJOR AXIS: 3.99 CM
ECHO LA VOL 2C: 100.29 ML (ref 22–52)
ECHO LA VOL 4C: 77.57 ML (ref 22–52)
ECHO LA VOL BP: 91.56 ML (ref 22–52)
ECHO LV E' LATERAL VELOCITY: 11.23 CM/S
ECHO LV E' SEPTAL VELOCITY: 8.94 CM/S
ECHO LV EDV TEICHHOLZ: 0.61 ML
ECHO LV ESV TEICHHOLZ: 0.19 ML
ECHO LV INTERNAL DIMENSION DIASTOLIC: 4.63 CM (ref 3.9–5.3)
ECHO LV INTERNAL DIMENSION SYSTOLIC: 2.82 CM
ECHO LV IVSD: 1.39 CM (ref 0.6–0.9)
ECHO LV IVSS: 2.09 CM
ECHO LV MASS 2D: 307.6 G (ref 67–162)
ECHO LV POSTERIOR WALL DIASTOLIC: 1.39 CM (ref 0.6–0.9)
ECHO LV POSTERIOR WALL SYSTOLIC: 1.94 CM
ECHO LVOT DIAM: 2.07 CM
ECHO LVOT PEAK GRADIENT: 4.8 MMHG
ECHO LVOT PEAK VELOCITY: 109.94 CM/S
ECHO MV A VELOCITY: 101.85 CM/S
ECHO MV E DECELERATION TIME (DT): 206 MS
ECHO MV E VELOCITY: 126.63 CM/S
ECHO MV E/A RATIO: 1.24
ECHO MV E/E' LATERAL: 11.28
ECHO MV E/E' RATIO (AVERAGED): 12.72
ECHO MV E/E' SEPTAL: 14.16
ECHO MV REGURGITANT PEAK GRADIENT: 54.3 MMHG
ECHO MV REGURGITANT PEAK VELOCITY: 368.39 CM/S
ECHO PULMONARY ARTERY SYSTOLIC PRESSURE (PASP): 41.3 MMHG
ECHO PV MAX VELOCITY: 119.15 CM/S
ECHO PV PEAK GRADIENT: 5.7 MMHG
ECHO RA AREA 4C: 16.11 CM2
ECHO RIGHT VENTRICULAR SYSTOLIC PRESSURE (RVSP): 41.3 MMHG
ECHO RV INTERNAL DIMENSION: 4.43 CM
ECHO TV REGURGITANT MAX VELOCITY: 279.6 CM/S
ECHO TV REGURGITANT PEAK GRADIENT: 31.3 MMHG
GLUCOSE BLD STRIP.AUTO-MCNC: 141 MG/DL (ref 65–100)
LVFS 2D: 39.1 %
LVSV (TEICH): 32.02 ML
MV DEC SLOPE: 6.21
SERVICE CMNT-IMP: ABNORMAL

## 2020-03-17 PROCEDURE — 74011636320 HC RX REV CODE- 636/320: Performed by: INTERNAL MEDICINE

## 2020-03-17 PROCEDURE — 71275 CT ANGIOGRAPHY CHEST: CPT

## 2020-03-17 PROCEDURE — 99284 EMERGENCY DEPT VISIT MOD MDM: CPT

## 2020-03-17 PROCEDURE — 82565 ASSAY OF CREATININE: CPT

## 2020-03-17 PROCEDURE — 82962 GLUCOSE BLOOD TEST: CPT

## 2020-03-17 PROCEDURE — 96374 THER/PROPH/DIAG INJ IV PUSH: CPT

## 2020-03-17 PROCEDURE — 93005 ELECTROCARDIOGRAM TRACING: CPT

## 2020-03-17 RX ORDER — SODIUM CHLORIDE 0.9 % (FLUSH) 0.9 %
10 SYRINGE (ML) INJECTION
Status: COMPLETED | OUTPATIENT
Start: 2020-03-17 | End: 2020-03-17

## 2020-03-17 RX ORDER — HYDROCODONE POLISTIREX AND CHLORPHENIRAMINE POLISTIREX 10; 8 MG/5ML; MG/5ML
5 SUSPENSION, EXTENDED RELEASE ORAL
Qty: 60 ML | Refills: 0 | Status: SHIPPED | OUTPATIENT
Start: 2020-03-17 | End: 2020-03-20

## 2020-03-17 RX ORDER — DOXYCYCLINE HYCLATE 100 MG
100 TABLET ORAL 2 TIMES DAILY
Qty: 14 TAB | Refills: 0 | Status: SHIPPED | OUTPATIENT
Start: 2020-03-17 | End: 2020-03-24

## 2020-03-17 RX ORDER — PREDNISONE 10 MG/1
TABLET ORAL
Qty: 21 TAB | Refills: 0 | Status: SHIPPED | OUTPATIENT
Start: 2020-03-17 | End: 2020-03-26

## 2020-03-17 RX ADMIN — IOPAMIDOL 100 ML: 755 INJECTION, SOLUTION INTRAVENOUS at 09:47

## 2020-03-17 RX ADMIN — Medication 10 ML: at 09:47

## 2020-03-17 NOTE — ED PROVIDER NOTES
EMERGENCY DEPARTMENT HISTORY AND PHYSICAL EXAM      Date: 3/17/2020  Patient Name: Derik Rosa    History of Presenting Illness     Chief Complaint   Patient presents with    Shortness of Breath     chronic SOB, increase in sx's last night, reports that she has recently seen Pulmonology and was sent for CTA chest and ECHO this morning - no PE noted; EMS state's that pt's sat's were at 68% when they arrived;    Low Blood Sugar     pt also states that she was having issues with her insulin pump - BG read at 585 - was told by MD to give herself 22 units followed by 13 units of regular insulin; lat BG check en route by EMS at 157       History Provided By: Patient    HPI: Derik Rosa, 53 y.o. female  With past medical history of chronic shortness of breath being evaluated as an outpatient by pulmonology, diabetes type 1, and panic attacks presenting today with shortness of breath. The patient actually had a CT scan of her chest this morning and this showed no evidence of pulmonary embolus or pneumonia. She had a echo as well which showed no significant abnormality. She reports that she continues having shortness of breath. She also notes that her pump was having issues and her blood glucose was around 585. She called her primary doctor who told her to take 22 units followed by 13 units of regular insulin. Her blood glucose has responded well to this. The patient denies any severe shortness of breath, no fevers, and her cough is nonproductive. No exacerbating or alleviating factors. There are no other complaints, changes, or physical findings at this time.     PCP: Conrado Gonzáles, DO    Current Facility-Administered Medications on File Prior to Encounter   Medication Dose Route Frequency Provider Last Rate Last Dose    [COMPLETED] iopamidoL (ISOVUE-370) 76 % injection 100 mL  100 mL IntraVENous RAD ONCE Lacho Rajan MD   100 mL at 03/17/20 7107    [COMPLETED] sodium chloride (NS) flush 10 mL  10 mL IntraVENous RAD ONCE Cuco Pisano MD   10 mL at 03/17/20 1181     Current Outpatient Medications on File Prior to Encounter   Medication Sig Dispense Refill    NOVOLOG U-100 INSULIN ASPART 100 unit/mL injection       gabapentin (NEURONTIN) 300 mg capsule TAKE TWO CAPSULES BY MOUTH THREE TIMES A  Cap 5    furosemide (LASIX) 40 mg tablet Take 80 mg by mouth two (2) times a day.  carvedilol (COREG) 6.25 mg tablet Take 1 Tab by mouth daily. Delete the 12.5 mg dose from profile 90 Tab 3    albuterol (PROVENTIL HFA, VENTOLIN HFA, PROAIR HFA) 90 mcg/actuation inhaler Take 1-2 Puffs by inhalation every four (4) hours as needed for Wheezing or Shortness of Breath. 1 Inhaler 3    atorvastatin (LIPITOR) 40 mg tablet TAKE ONE TABLET BY MOUTH DAILY 90 Tab 3    Nebulizer & Compressor machine Use q4 hours as needed for wheezing 1 Each 0    albuterol-ipratropium (DUO-NEB) 2.5 mg-0.5 mg/3 ml nebu 3 mL by Nebulization route every four (4) hours as needed (wheezing). 30 Nebule 1    esomeprazole (NEXIUM) 40 mg capsule TAKE ONE CAPSULE BY MOUTH DAILY 30 Cap 11    fluticasone propionate (FLONASE) 50 mcg/actuation nasal spray SPRAY TWO SPRAYS IN EACH NOSTRIL ONCE DAILY 16 g 3    insulin pump (PATIENT SUPPLIED) misc by SubCUTAneous route as needed.  HYDROcodone-acetaminophen (NORCO)  mg tablet Take 1 Tab by mouth.  LORazepam (ATIVAN) 2 mg tablet Take 2 mg by mouth four (4) times daily.  citalopram (CELEXA) 20 mg tablet Take 40 mg by mouth daily.  traZODone (DESYREL) 50 mg tablet Take 100 mg by mouth nightly.  insulin lispro (HUMALOG) 100 unit/mL injection Use as directed for insulin pump.   Max 100 units per day--BILL MEDICARE PART B--DX E10.65--REPLACES NOVOLOG 3 Vial 11    CONTOUR NEXT TEST STRIPS strip Use as directed to test up to 8 x per day,  Uncontrolled Type 1 diabetes with Neuropathy (HCC) E10.40, E10.65 250 Strip 11    Insulin Syringe-Needle U-100 0.5 mL 31 gauge x 5/16\" syrg Use as directed up to 4 times daily 100 Syringe 11    Insulin Needles, Disposable, (BD ULTRA-FINE MINI PEN NEEDLE) 31 gauge x 3/16\" ndle Use as directed up to 8 times daily 200 Pen Needle 11    levonorgestrel (MIRENA) 20 mcg/24 hr (5 years) IUD 1 Device by IntraUTERine route once.  ARIPiprazole (ABILIFY) 30 mg tablet Take 30 mg by mouth daily.  fentaNYL (DURAGESIC) 100 mcg/hr PATCH 1 Patch by TransDERmal route every seventy-two (72) hours.  FREESTYLE MARIELA SENSOR kit Use 1 sensor every 10 days as directed 3 Each 11    [DISCONTINUED] umeclidinium-vilanterol (ANORO ELLIPTA) 62.5-25 mcg/actuation inhaler Take 1 Puff by inhalation daily.  1 Inhaler 11    glucagon (GLUCAGON EMERGENCY KIT, HUMAN,) 1 mg injection USE A DIRECTED 2 Vial 11       Past History     Past Medical History:  Past Medical History:   Diagnosis Date    Achilles tendon rupture     along with torn right patellar/femoral ligament    Arthritis     rt. foot    Chronic kidney disease     Chronic pain     abdominal pain    Diabetes (HCC)     IDDM on insulin pump and sensor    DM type 1 (diabetes mellitus, type 1) ([address]     age 25    Endometriosis     s/p ex-lap 4x    Gastric ulcer     GERD (gastroesophageal reflux disease)     Herpes     Other and unspecified hyperlipidemia     Panic attacks     Psychiatric disorder     anxiety, panic attacks    PTSD (post-traumatic stress disorder)     Unspecified essential hypertension        Past Surgical History:  Past Surgical History:   Procedure Laterality Date    HX COLONOSCOPY      HX GYN      2 d&c's    HX GYN      laparoscopies x5 for endometriosis    HX GYN      mirena in place    HX ORTHOPAEDIC  2002    achiles tendon repair,talus repair    HX ORTHOPAEDIC      injections x2 to right shoulder    HX ORTHOPAEDIC Left 02/07/2019    3rd trigger finger release       Family History:  Family History   Problem Relation Age of Onset    Diabetes Mother diet controlled    Diabetes Father         R foot amupation    Liver Disease Father     Heart Disease Paternal Uncle         MI in his 46s    Stroke Neg Hx        Social History:  Social History     Tobacco Use    Smoking status: Current Every Day Smoker     Packs/day: 1.00     Years: 28.00     Pack years: 28.00     Types: Cigarettes    Smokeless tobacco: Current User    Tobacco comment: Trying to quit   Substance Use Topics    Alcohol use: Not Currently     Comment: a beer every few months    Drug use: No     Types: OTC, Prescription       Allergies: Allergies   Allergen Reactions    Metolazone Other (comments)     Caused hyponatremia    Zocor [Simvastatin] Myalgia    Lisinopril Cough         Review of Systems   Constitutional: No  fever  Skin: No  rash  HEENT: No  nasal congestion  Resp: + cough  CV: No chest pain  GI: No vomiting  : No dysuria  MSK: No joint pain  Neuro: No numbness  Psych: No suicidal      Physical Exam     Patient Vitals for the past 12 hrs:   Temp Pulse Resp BP SpO2   03/17/20 1833 98 °F (36.7 °C) 92 20 143/79 93 %     General: alert, No acute distress  Eyes: EOMI, normal conjunctiva  ENT: moist mucous membranes. Neck: Active, full ROM of neck. Skin: No rashes. no jaundice              Lungs: Equal chest expansion. no respiratory distress. clear to auscultation bilaterally No accessory muscle usage  Heart: regular rate     no peripheral edema   2+ radial pulses and DPs bilaterally  Abd:  non distended soft, nontender. No rebound tenderness. No guarding  Back: Full ROM  MSK: Full, active ROM in all 4 extremities.    Neuro: Person, Place, Time and Situation; normal speech;   Psych: Cooperative with exam; Appropriate mood and affect             Diagnostic Study Results     Labs -     Recent Results (from the past 12 hour(s))   CREATININE, POC    Collection Time: 03/17/20  9:30 AM   Result Value Ref Range    Creatinine (POC) 1.1 0.6 - 1.3 mg/dL    GFRAA, POC >60 >60 ml/min/1.73m2 GFRNA, POC 53 (L) >60 ml/min/1.73m2   ECHO ADULT COMPLETE    Collection Time: 03/17/20 11:05 AM   Result Value Ref Range    LA Volume 91.56 22 - 52 mL    Right Atrial Area 4C 16.11 cm2    LV E' Lateral Velocity 11.23 cm/s    LV E' Septal Velocity 8.94 cm/s    Aortic Valve Systolic Peak Velocity 327.70 cm/s    Aortic Valve Area by Continuity of Peak Velocity 2.1 cm2    AoV PG 12.2 mmHg    LVIDd 4.63 3.9 - 5.3 cm    LVPWd 1.39 (A) 0.6 - 0.9 cm    LVIDs 2.82 cm    IVSd 1.39 (A) 0.6 - 0.9 cm    IVSs 2.09 cm    LVOT d 2.07 cm    LVOT Peak Velocity 109.94 cm/s    LVOT Peak Gradient 4.8 mmHg    MV A Young 101.85 cm/s    MV E Young 126.63 cm/s    MV E/A 1.24     RVIDd 4.43 cm    LA Vol 4C 77.57 (A) 22 - 52 mL    LA Vol 2C 100.29 (A) 22 - 52 mL    LA Area 4C 24.7 cm2    LV Mass .6 (A) 67 - 162 g    LVPWs 1.94 cm    E/E' lateral 11.28     E/E' septal 14.16     RVSP 41.3 mmHg    E/E' ratio (averaged) 12.72     Est. RA Pressure 10.0 mmHg    Mitral Regurgitant Peak Velocity 368.39 cm/s    Mitral Valve E Wave Deceleration Time 206.0 ms    Left Atrium Major Axis 3.99 cm    Triscuspid Valve Regurgitation Peak Gradient 31.3 mmHg    Pulmonic Valve Max Velocity 119.15 cm/s    TR Max Velocity 279.60 cm/s    PASP 41.3 mmHg    MR Peak Gradient 54.3 mmHg    BHAVIK/BSA Pk Young 1.0 cm2/m2    AV Cusp 1.91 cm    Left Ventricular Fractional Shortening by 2D 36.32154354 %    Mitral Valve Deceleration [place] 4.632063550401     AV Velocity Ratio 0.63     Left Ventricular End Diastolic Volume by Teichholz Method 7.90256724059 mL    Left Ventricular End Systolic Volume by Teichholz Method 4.75340323274 mL    Left Ventricular Stroke Volume by Teichholz Method 49.645745402 mL    PV peak gradient 5.7 mmHg   GLUCOSE, POC    Collection Time: 03/17/20  6:42 PM   Result Value Ref Range    Glucose (POC) 141 (H) 65 - 100 mg/dL    Performed by [facility]        Radiologic Studies -   No orders to display     CT Results  (Last 48 hours) 03/17/20 0947  CTA [facility] CONT Final result    Impression:  IMPRESSION:    1. No definite pulmonary emboli identified. 2. There is scarring in the right middle lobe. 3. There are small subpleural cystic changes predominantly upper lobes   consistent with emphysema. 4. There is borderline enlargement of the main pulmonary artery. 5. There is a small amount of mucus in the trachea and right mainstem bronchus. Narrative:  INDICATION:   Hypoxia       COMPARISON: None. TECHNIQUE:    Precontrast  images were obtained to localize the volume for acquisition. Multislice helical CT arteriography was performed from the diaphragm to the   thoracic inlet during uneventful rapid bolus intravenous administration of 100   mL of Isovue-370. Lung and soft tissue windows were generated. Post processing   was performed and coronal reformatted images were also generated. 3 D imaging   was performed. CT dose reduction was achieved through use of a standardized   protocol tailored for this examination and automatic exposure control for dose   modulation. FINDINGS:   Lungs are clear of an acute process. There are small subpleural cystic changes   predominantly in the upper lobes consistent with emphysema. There is scarring in   the right middle lobe. A small amount of mucus noted in the trachea and right   bronchus. No definite pulmonary embolus is identified. The main pulmonary artery is   borderline enlarged measuring 3.3 cm. There is no mediastinal or hilar adenopathy or mass. The aorta enhances normally   without evidence of aneurysm or dissection. The visualized portions of the upper abdominal organs are normal.               CXR Results  (Last 48 hours)    None          Medical Decision Making   I am the first provider for this patient.     I reviewed the vital signs, available nursing notes, past medical history, past surgical history, family history and social history. Records Reviewed: She had a CT angiography of the chest this morning that showed no evidence of pulmonary embolus    Provider Notes (Medical Decision Making):     Differential Diagnosis: Bronchitis, COPD exacerbation, PE, pneumonia hyperglycemia    I reviewed the vital signs, available nursing notes, past medical history, past surgical history, family history and social history and old medical records. On my interpretation, Laboratory workup is significant for glucose of 141  On my interpretation of the radiology studies of the chest reveals no evidence of pulmonary embolus, main pulmonary artery is slightly enlarged, no evidence of infiltrative pneumonia    Management/ED course: Patient presents today with continued shortness of breath. She is undergoing outpatient evaluation, and actually already had a CT scan this morning. On exam she is in no acute distress, has no significant abnormal lung sounds, and normal oxygen saturations on room air. I reviewed the that she had earlier today, and shows no evidence of pneumonia or pulmonary embolus. I do not feel that she needs further aggressive evaluation. Afebrile. Patient will be treated for bronchitis with steroids, antibiotics, and she is attempting to quit smoking with patches at home. We discussed the importance of this and the fact that this is likely causing worsening of her symptoms. Ultimately patient is discharged with return precautions. Cherelle Chavez MD, am the attending of record for this patient encounter. TOBACCO COUNSELING:  Upon evaluation, pt expressed that they are a current tobacco user. For approximately 3 minutes, pt has been counseled on the dangers of smoking and was encouraged to quit as soon as possible in order to decrease further risks to their health. Pt has conveyed their understanding of the risks involved should they continue to use tobacco products. Dispo: Discharged.  The patient has been re-evaluated and is ready for discharge. Reviewed available results with patient. Counseled patient on diagnosis and care plan. Patient has expressed understanding, and all questions have been answered. Patient agrees with plan and agrees to follow up as recommended, or to return to the ED if their symptoms worsen. Discharge instructions have been provided and explained to the patient, along with reasons to return to the ED. PLAN:  Current Discharge Medication List      START taking these medications    Details   chlorpheniramine-HYDROcodone (Tussionex Pennkinetic ER) 10-8 mg/5 mL suspension Take 5 mL by mouth every twelve (12) hours as needed for Cough for up to 3 days. Max Daily Amount: 10 mL. Qty: 60 mL, Refills: 0    Associated Diagnoses: Mucopurulent chronic bronchitis (HCC)      doxycycline (VIBRA-TABS) 100 mg tablet Take 1 Tab by mouth two (2) times a day for 7 days. Qty: 14 Tab, Refills: 0      predniSONE (STERAPRED DS) 10 mg dose pack As per instructions on dosepack  Qty: 21 Tab, Refills: 0           2. Follow-up Information     Follow up With Specialties Details Why Anjana Laurent, Silvio Harley DO Internal Medicine Schedule an appointment as soon as possible for a visit in 2 days As needed [address] [facility] [address]. [phone]          3. Return to ED if worse       Diagnosis     Clinical Impression:   1. Mucopurulent chronic bronchitis ([address]    2. Smoker    3. Cough    4. Dyspnea, unspecified type        Attestations:    Melany Padilla MD    Please note that this dictation was completed with [employer] the [employer] voice recognition software. Quite often unanticipated grammatical, syntax, homophones, and other interpretive errors are inadvertently transcribed by the computer software. Please disregard these errors. Please excuse any errors that have escaped final proofreading. Thank you.

## 2020-03-17 NOTE — TELEPHONE ENCOUNTER
Patient called and stated that she had an xray,eeg and her pulse ox was 86 when she was there. She stated that since she has been home her sugar is currently 558. She stated she gave her self a bolus and changed the site and it is still over 500. Dr. Dimitri Hayes was notified and per him she is to give her self a now bolus of 12 units with a syringe and to make sure she drinks at least 32 oz of water today. Patient was notified and voiced understanding.

## 2020-03-17 NOTE — TELEPHONE ENCOUNTER
Patient called back and stated that she checked her pulse SpO2 and it was 86. Per Dr. Deidre Schaffer if her sugar does not go down under 350 within the next two hours and her pulse SpO2 remain at 86 then she is to go to the ER. Craig Villalpando Patient voiced understanding.

## 2020-03-17 NOTE — TELEPHONE ENCOUNTER
----- Message from Corie Gosselin sent at 3/17/2020  5:03 PM EDT -----  Regarding: Dr Emanuel Croft Message/Vendor Calls    Caller's first and last name:      Reason for call: pt is requesting a call from [facility].  Pt indicates that she just got off the phone with her      Callback required yes/no and why:      Best contact number(s):[phone]      Details to clarify the request:      Corie Gosselin

## 2020-03-18 ENCOUNTER — TELEPHONE (OUTPATIENT)
Dept: ENDOCRINOLOGY | Age: 50
End: 2020-03-18

## 2020-03-18 LAB
ATRIAL RATE: 84 BPM
CALCULATED P AXIS, ECG09: 58 DEGREES
CALCULATED R AXIS, ECG10: -9 DEGREES
CALCULATED T AXIS, ECG11: 50 DEGREES
DIAGNOSIS, 93000: NORMAL
P-R INTERVAL, ECG05: 166 MS
Q-T INTERVAL, ECG07: 376 MS
QRS DURATION, ECG06: 72 MS
QTC CALCULATION (BEZET), ECG08: 444 MS
VENTRICULAR RATE, ECG03: 84 BPM

## 2020-03-18 NOTE — TELEPHONE ENCOUNTER
Patient stated that she was seen in the ER last night and was dx with bronchitis. She stated she was prescribed Prednisone 10 mg dose pack,amoxicillin and cough syrup. She stated this morning her sugar was 350 when she called not 450 and currently it is 253. Patient stated she will be picking up the dose pack to day and she would like to know how to do her bolus while on the steroid?

## 2020-03-18 NOTE — TELEPHONE ENCOUNTER
Please have her run a temporary basal of 140% while on the steroid dose back for the duration of the time she is on this to keep her sugars down.

## 2020-03-18 NOTE — TELEPHONE ENCOUNTER
Pt notified of message per Dr. Margi Mauro and voiced understanding of what was read to her. She stated she took the steroid and currently her sugar is 444 so she will increase her basal now.

## 2020-03-18 NOTE — TELEPHONE ENCOUNTER
----- Message from Suellen Lyle sent at 3/18/2020  8:42 AM EDT -----  Regarding: Dr. Nadine Meyer: [phone]  Caller's first and last name: n/a  Reason for call: Pt wanted to inform her pcp of her readings. Callback required yes/no and why: Yes, if needed. Best contact number(s): [phone]  Details to clarify the request:  Pt wanted to inform that her sugar levels were at 450, her pulse ox is 86 and her hear rate is 102.

## 2020-03-19 ENCOUNTER — PATIENT OUTREACH (OUTPATIENT)
Dept: CARDIOLOGY CLINIC | Age: 50
End: 2020-03-19

## 2020-03-19 NOTE — PROGRESS NOTES
COVID-19 Screening Initial Follow-up Note    Patient contacted regarding COVID-19  risk. Care Transition Nurse/ Ambulatory Care Manager contacted the patient by telephone to perform post discharge assessment. Verified name and  with patient as identifiers. Provided introduction to self, and explanation of the CTN/ACM role, and reason for call due to risk factors for infection and/or exposure to COVID-19. Symptoms reviewed with patient who verbalized the following symptoms:   Fever no    Fatigue no   Pain or aching joints no  Cough no  Shortness of breath no    Confusion or unusual change in mental status no    Chills or shaking no    Sweating no    Fast heart rate no    Fast breathing no    Dizziness/lightheadedness no    Less urine output no    Cold, clammy, and pale skin no  Low body temperature no         Patient has following risk factors of: DM and ED visit. CTN/ACM reviewed discharge instructions, medical action plan and red flags such as increased shortness of breath, increasing fever and signs of decompensation with patient who verbalized understanding. Discussed exposure protocols and quarantine with CDC Guidelines What to do if you are sick with coronavirus disease 2019 Patient who was given an opportunity for questions and concerns. The patient agrees to contact the [facility] exposure line, local health department and PCP office for questions related to their healthcare. CTN provided contact information for future reference. Reviewed and educated patient on any new and changed medications related to discharge diagnosis     Pt reports that she lives across the [place] for [facility] and practices frequent hand washing and states understanding to limit outside exposure. Pt also states that she practices social distancing. I provided the access line number to patient as well as DH. Pt states appreciation for call and that I may call in 1 week.     Plan for follow-up call in 5-7 days based on severity of symptoms and risk factors

## 2020-03-20 ENCOUNTER — TELEPHONE (OUTPATIENT)
Dept: ENDOCRINOLOGY | Age: 50
End: 2020-03-20

## 2020-03-20 NOTE — TELEPHONE ENCOUNTER
Form from [address] [place] [address] was faxed on 3/19/20, confirmation received and put in urgent tray to scan.

## 2020-03-20 NOTE — TELEPHONE ENCOUNTER
Have her take a manual dose of 20 units of her insulin via a syringe and then increase the temp basal to 160% over the next few days until her steroid pack is done.

## 2020-03-20 NOTE — TELEPHONE ENCOUNTER
Patient called and stated that she increased her basal to 150 this morning and currenly her sugar is 511. Patient stated she is drinking a lot of water as directed. and would like to know what she should do?

## 2020-03-23 ENCOUNTER — TELEPHONE (OUTPATIENT)
Dept: ENDOCRINOLOGY | Age: 50
End: 2020-03-23

## 2020-03-23 RX ORDER — INSULIN ASPART 100 [IU]/ML
INJECTION, SOLUTION INTRAVENOUS; SUBCUTANEOUS
Qty: 30 ML | Refills: 11 | Status: SHIPPED | OUTPATIENT
Start: 2020-03-23 | End: 2020-05-05 | Stop reason: SDUPTHER

## 2020-03-23 NOTE — TELEPHONE ENCOUNTER
----- Message from Rodriog Mcrae sent at 3/23/2020 11:06 AM EDT -----  Regarding: Prescription Question  Contact: [phone]  Horace Vora out of [place].   Please call asap to pharm

## 2020-03-26 ENCOUNTER — VIRTUAL VISIT (OUTPATIENT)
Dept: ENDOCRINOLOGY | Age: 50
End: 2020-03-26

## 2020-03-26 DIAGNOSIS — R94.6 BORDERLINE ABNORMAL THYROID FUNCTION TEST: ICD-10-CM

## 2020-03-26 DIAGNOSIS — R79.89 ELEVATED LFTS: ICD-10-CM

## 2020-03-26 DIAGNOSIS — E78.5 HYPERLIPIDEMIA LDL GOAL <100: ICD-10-CM

## 2020-03-26 DIAGNOSIS — I10 ESSENTIAL HYPERTENSION: ICD-10-CM

## 2020-03-26 NOTE — PATIENT INSTRUCTIONS
1) We will keep your pump settings the same for now. 2) Please come for a follow up visit on 6/30/20 at 9:10am in our [place] office. 3) Please try to see if you can have Roovyn josé miguel ready for our next visit if we are still doing virtual visits. Thanks.

## 2020-03-26 NOTE — PROGRESS NOTES
Chief Complaint   Patient presents with    Diabetes     pcp and pharmacy confirmed    Other     PATIENT COULD NOT GET  [address] TO WORK. ... PLEASE CALL. .. [phone]    Other     patient tried to open the doxy but the phone sent her a messaage that stated the browser is not supported     **THIS IS A VIRTUAL VISIT VIA A TELEPHONE ENCOUNTER. PATIENT AGREED TO HAVE THEIR CARE DELIVERED OVER THE PHONE IN PLACE OF THEIR REGULARLY SCHEDULED OFFICE VISIT**      History of Present Illness: Evin Padilla is a 53 y.o. female here for follow up of diabetes. Current settings are as follows:  - basal: 12a: 1.55, 4:30a: 1.65, 10p: 1.55  - Carb ratio: 10  - sensitivity: 20  - target: 12a: 130-150, 6a: 100-130, 10p: 130-150  - active insulin time: 3 hours    She was seen in the ER on 3/17/20 for bronchitis and was given abx and a steroid pack and her sugars went to the 400-500s. I had her run a temp basal on her pump and she needed as much as 160% on her temp basal.  With this rate, it came down to the 200-300s. She is now off these meds and sugars are back in the 80-130s. Compliant with rest of meds below. Current Outpatient Medications   Medication Sig    insulin aspart U-100 (NovoLOG U-100 Insulin aspart) 100 unit/mL injection USE AS DIRECTED FOR INSULIN PUMP.  UNITS PER DAY. DX E11.65    carvedilol (COREG) 6.25 mg tablet Take 1 Tab by mouth daily. Delete the 12.5 mg dose from profile    albuterol (PROVENTIL HFA, VENTOLIN HFA, PROAIR HFA) 90 mcg/actuation inhaler Take 1-2 Puffs by inhalation every four (4) hours as needed for Wheezing or Shortness of Breath.  atorvastatin (LIPITOR) 40 mg tablet TAKE ONE TABLET BY MOUTH DAILY    Nebulizer & Compressor machine Use q4 hours as needed for wheezing    albuterol-ipratropium (DUO-NEB) 2.5 mg-0.5 mg/3 ml nebu 3 mL by Nebulization route every four (4) hours as needed (wheezing).     esomeprazole (NEXIUM) 40 mg capsule TAKE ONE CAPSULE BY MOUTH DAILY  fluticasone propionate (FLONASE) 50 mcg/actuation nasal spray SPRAY TWO SPRAYS IN EACH NOSTRIL ONCE DAILY    insulin pump (PATIENT SUPPLIED) misc by SubCUTAneous route as needed.  HYDROcodone-acetaminophen (NORCO)  mg tablet Take 1 Tab by mouth.  LORazepam (ATIVAN) 2 mg tablet Take 2 mg by mouth four (4) times daily.  citalopram (CELEXA) 20 mg tablet Take 40 mg by mouth daily.  traZODone (DESYREL) 50 mg tablet Take 100 mg by mouth nightly.  CONTOUR NEXT TEST STRIPS strip Use as directed to test up to 8 x per day,  Uncontrolled Type 1 diabetes with Neuropathy (HCC) E10.40, E10.65    Insulin Syringe-Needle U-100 0.5 mL 31 gauge x 5/16\" syrg Use as directed up to 4 times daily    glucagon (GLUCAGON EMERGENCY KIT, HUMAN,) 1 mg injection USE A DIRECTED    Insulin Needles, Disposable, (BD ULTRA-FINE MINI PEN NEEDLE) 31 gauge x 3/16\" ndle Use as directed up to 8 times daily    levonorgestrel (MIRENA) 20 mcg/24 hr (5 years) IUD 1 Device by IntraUTERine route once.  fentaNYL (DURAGESIC) 100 mcg/hr PATCH 1 Patch by TransDERmal route every seventy-two (72) hours.  gabapentin (NEURONTIN) 300 mg capsule TAKE TWO CAPSULES BY MOUTH THREE TIMES A DAY    furosemide (LASIX) 40 mg tablet Take 80 mg by mouth two (2) times a day. No current facility-administered medications for this visit. Allergies   Allergen Reactions    Metolazone Other (comments)     Caused hyponatremia    Zocor [Simvastatin] Myalgia    Lisinopril Cough     Review of Systems: Not asked today    Physical Examination: NOT PERFORMED DUE TO THIS BEING A VIRTUAL VISIT    Data Reviewed:   - none new for review    Assessment/Plan:     1) 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):  Most recent Hgb A1c was 9.5% in 12/19 down from 10.6% in 9/19 up from 9.3% in 7/19 down from 10.1% in 5/19 up from 10% in 1/19 up from 9.2% in 10/18 up from 9% in 7/18 up from 8.2% in 4/18 down from 8.8% in 11/17 up from 7.4% in 9/17 down from 9.4% in 2/17 up from 9.1% in 11/16 down from 10% in 3/16 up from 9.4% in 12/15 up from 9.2% in 9/15 down from 9.7% in 5/15 down from 10.3% in 1/15 up from 8.9% in 10/14 down from 11.1% in 4/14 up from 9.2% in 1/14 up from 8.6% in March 2013 down from 8.9% in November down from 9.7% in Aug 2012 up from 9.2% in October 2011 up from 8.7% in May down from 9.7% in March up from 9.1% in July 2010 down from 10.2% in January down from 13.7% in October 2009. Her blood sugars were much higher on the abx and steroids but have come down off these so won't make any changes at this time. - cont current pump settings   - check bs 8 times daily due to fluctuating blood sugars  - foot exam done 9/19  - microalbumin was 69 in 7/10 up to 392 in March 2011 and 621 in May down to 216 in October 2011 and 37.3 in 4/14 and up to 183 in 12/15 and 300 in 9/16, down to 85 in 4/18 and 10.2 in 9/19 and 3.8 in 12/19  - optho UTD 3/18--due now  - check POC A1c at next visit       2) HTN NOS (401.9): Blood pressure was at goal < 140/90 at last visit  - cont coreg 6.25 mg daily  - was previously on losartan 25 mg daily  - cont lasix 80 mg bid      3) Hyperlipidemia (272.4): Given DM, goal LDL is < 100, non-HDL < 130, and TGs < 150. LDL was 146 in January 2010, down to 124 in July and 93 in March 2011 with taking 5 mg of crestor daily. Her crestor was changed to pravastatin by Dr. Olivia Pretty because of cost in Feb 2012. LDL 93 in 8/12 but her HDL was high at 138 due to ETOH intake. LDL down to 39 in 11/12 but up to 102 in 3/13. Off pravastatin at this time and previously was on 10 mg daily.  in 1/14. Up to 167 in 4/14 so started lovastatin 20 mg daily but she couldn't afford this. She has been started on crestor 40 mg daily by crossover clinic and LDL 64 in 10/14 and 55 in 1/15. Was 37 in 3/16 so dose decreased to 20 mg daily and LDL 23 in 2/17.   Was changed to lipitor 1/2 of 80 mg daily when clinic couldn't get crestor any longer and LDL 37 in 9/17. This was stopped during her hospital stay in 11/17 but was restarted by Dr. Forman Him in 12/17 and 42 in 4/18. Up to 94 in 8/18. Down to 79 in 1/19 and 88 in 9/19 and 78 in 12/19  - cont lipitor 40 mg daily      4) Elevated LFTs (790.6): I told her previously that her LFTs were elevated likely due to ETOH intake so I advised her to cut back on this and her repeat LFTs were normal in 3/13 and 1/14 and 4/14 and 10/14 and 1/15 and 12/15 and 3/16. AST up to 76 in 2/17 but back to normal in 5/17 and has been normal ever since so will follow this. 5) Borderline abnormal TFT: TSH 2.1 in 12/11 but up to 4.01 in 11/12 and 4.75 in 11/17 during 2 hospital stays. Up to 5.45 in 1/19 with Dr. Dasia Tomlin. Repeat TSH 1.24 and FT4 0.95 and TPO ab 21 in 2/19 so held on any treatment. TSH up to 3.36 in 9/19 but down to 2.61 in 12/19  - check TSH in the fall 2020    Patient Instructions   1) We will keep your pump settings the same for now. 2) Please come for a follow up visit on 6/30/20 at 9:10am in our [place] office. 3) Please try to see if you can have [employer] josé miguel ready for our next visit if we are still doing virtual visits. Thanks. Follow-up and Dispositions    · Return for 6/30/20 at 9:10am.       We spent 10 minutes of total time together during this virtual visit with a telephone encounter.   All questions were answered and a copy of the instructions were sent to her via [employer]    Copy sent to:  Dr. Megan Forman Him via [email or web address]

## 2020-04-01 RX ORDER — BLOOD SUGAR DIAGNOSTIC
STRIP MISCELLANEOUS
Qty: 250 STRIP | Refills: 11 | Status: SHIPPED | OUTPATIENT
Start: 2020-04-01 | End: 2020-10-06 | Stop reason: SDUPTHER

## 2020-04-02 ENCOUNTER — PATIENT OUTREACH (OUTPATIENT)
Dept: CARDIOLOGY CLINIC | Age: 50
End: 2020-04-02

## 2020-05-06 RX ORDER — INSULIN ASPART 100 [IU]/ML
INJECTION, SOLUTION INTRAVENOUS; SUBCUTANEOUS
Qty: 30 ML | Refills: 11 | Status: SHIPPED | OUTPATIENT
Start: 2020-05-06 | End: 2020-09-11 | Stop reason: CLARIF

## 2020-06-02 LAB — CREATININE, EXTERNAL: 1.09

## 2020-06-25 RX ORDER — SPIRONOLACTONE 25 MG/1
TABLET ORAL DAILY
COMMUNITY
End: 2021-01-12

## 2020-07-01 LAB — EF %, EXTERNAL: NORMAL

## 2020-07-07 ENCOUNTER — VIRTUAL VISIT (OUTPATIENT)
Dept: ENDOCRINOLOGY | Age: 50
End: 2020-07-07

## 2020-07-07 DIAGNOSIS — I10 ESSENTIAL HYPERTENSION: ICD-10-CM

## 2020-07-07 DIAGNOSIS — R94.6 BORDERLINE ABNORMAL THYROID FUNCTION TEST: ICD-10-CM

## 2020-07-07 DIAGNOSIS — E78.5 HYPERLIPIDEMIA LDL GOAL <100: ICD-10-CM

## 2020-07-07 DIAGNOSIS — R79.89 ELEVATED LFTS: ICD-10-CM

## 2020-07-07 NOTE — PROGRESS NOTES
Chief Complaint   Patient presents with    Diabetes     pcp and pharmacy confirmed       **THIS IS A VIRTUAL VISIT VIA A VIDEO SYNCHRONOUS DISCUSSION. PATIENT AGREED TO HAVE THEIR CARE DELIVERED OVER A DOXY. ME VIDEO VISIT IN PLACE OF THEIR REGULARLY SCHEDULED OFFICE VISIT**    History of Present Illness: Chula Kirby is a 53 y.o. female here for follow up of diabetes. Current settings are as follows:  - basal: 12a: 1.55, 4:30a: 1.65, 10p: 1.55  - Carb ratio: 10  - sensitivity: 20  - target: 12a: 130-150, 6a: 100-130, 10p: 130-150  - active insulin time: 3 hours    Still checking 8 times per day over the past 90 days. Fasting sugars are in the 130-160s and during the day can be in the 170s to low 200s. The only time it spikes is with pizza. No severe lows under 50 and just a few in the 60-70s but no pattern to them. Her BP has been controlled. Had scotty added by Dr. Ying Davis to help with her edema. This is in addition to her lasix. Just had a bubble study that shows a PFO and will be sent to a structural heart specialist.  Also will be having a doppler tomorrow to rule out DVT. Compliant with rest of meds below. she has the following indications to restart treatment with freestyle ellen:  1) she has type 1 diabetes and is on an intensive insulin regimen with an insulin pump  2) she tests her blood sugar 8 times per day and makes treatment decisions off her blood sugar readings and her sensor readings  3) she requires adjustments to her insulin pump setttings based on her sensor readings  4) she has benefitted from therapeutic continuous glucose monitoring in the past and I recommend that she restart this  5) she is seen in my office every 3 months         Current Outpatient Medications   Medication Sig    spironolactone (ALDACTONE) 25 mg tablet Take  by mouth daily.  insulin aspart U-100 (NovoLOG U-100 Insulin aspart) 100 unit/mL injection USE AS DIRECTED FOR INSULIN PUMP.  UNITS PER DAY. DX E11.65    glucagon (Glucagon Emergency Kit, human,) 1 mg injection USE AS DIRECTED    Contour Next Test Strips strip USE AS DIRECTED TO TEST UP TO 8 TIMES PER DAY--DX E10.65    gabapentin (NEURONTIN) 300 mg capsule TAKE TWO CAPSULES BY MOUTH THREE TIMES A DAY    furosemide (LASIX) 40 mg tablet Take 80 mg by mouth two (2) times a day.  carvedilol (COREG) 6.25 mg tablet Take 1 Tab by mouth daily. Delete the 12.5 mg dose from profile    albuterol (PROVENTIL HFA, VENTOLIN HFA, PROAIR HFA) 90 mcg/actuation inhaler Take 1-2 Puffs by inhalation every four (4) hours as needed for Wheezing or Shortness of Breath.  atorvastatin (LIPITOR) 40 mg tablet TAKE ONE TABLET BY MOUTH DAILY    Nebulizer & Compressor machine Use q4 hours as needed for wheezing    albuterol-ipratropium (DUO-NEB) 2.5 mg-0.5 mg/3 ml nebu 3 mL by Nebulization route every four (4) hours as needed (wheezing).  esomeprazole (NEXIUM) 40 mg capsule TAKE ONE CAPSULE BY MOUTH DAILY    fluticasone propionate (FLONASE) 50 mcg/actuation nasal spray SPRAY TWO SPRAYS IN EACH NOSTRIL ONCE DAILY    insulin pump (PATIENT SUPPLIED) misc by SubCUTAneous route as needed.  HYDROcodone-acetaminophen (NORCO)  mg tablet Take 1 Tab by mouth.  LORazepam (ATIVAN) 2 mg tablet Take 2 mg by mouth three (3) times daily.  citalopram (CELEXA) 20 mg tablet Take 40 mg by mouth daily.  traZODone (DESYREL) 50 mg tablet Take 100 mg by mouth nightly.  Insulin Syringe-Needle U-100 0.5 mL 31 gauge x 5/16\" syrg Use as directed up to 4 times daily    Insulin Needles, Disposable, (BD ULTRA-FINE MINI PEN NEEDLE) 31 gauge x 3/16\" ndle Use as directed up to 8 times daily    levonorgestrel (MIRENA) 20 mcg/24 hr (5 years) IUD 1 Device by IntraUTERine route once.  fentaNYL (DURAGESIC) 100 mcg/hr PATCH 1 Patch by TransDERmal route every seventy-two (72) hours. No current facility-administered medications for this visit.       Allergies   Allergen Reactions  Metolazone Other (comments)     Caused hyponatremia    Zocor [Simvastatin] Myalgia    Lisinopril Cough     Review of Systems: PER HPI    Physical Examination:  - GENERAL: NCAT, Appears well nourished   - EYES: EOMI, non-icteric, no proptosis   - Ear/Nose/Throat: NCAT, no visible inflammation or masses   - CARDIOVASCULAR: no cyanosis, no visible JVD   - RESPIRATORY: respiratory effort normal without any distress or labored breathing   - MUSCULOSKELETAL: Normal ROM of neck and upper extremities observed   - SKIN: No rash on face  - NEUROLOGIC:  No facial asymmetry (Cranial nerve 7 motor function), No gaze palsy   - PSYCHIATRIC: Normal affect, Normal insight and judgement       Data Reviewed:   - none new for review    Assessment/Plan:     1) 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): Most recent Hgb A1c was 9.5% in 12/19 down from 10.6% in 9/19 up from 9.3% in 7/19 down from 10.1% in 5/19 up from 10% in 1/19 up from 9.2% in 10/18 up from 9% in 7/18 up from 8.2% in 4/18 down from 8.8% in 11/17 up from 7.4% in 9/17 down from 9.4% in 2/17 up from 9.1% in 11/16 down from 10% in 3/16 up from 9.4% in 12/15 up from 9.2% in 9/15 down from 9.7% in 5/15 down from 10.3% in 1/15 up from 8.9% in 10/14 down from 11.1% in 4/14 up from 9.2% in 1/14 up from 8.6% in March 2013 down from 8.9% in November down from 9.7% in Aug 2012 up from 9.2% in October 2011 up from 8.7% in May down from 9.7% in March up from 9.1% in July 2010 down from 10.2% in January down from 13.7% in October 2009. Her blood sugars sound fairly well controlled and don't see any trends that warrant changes to her settings.   - cont current pump settings   - check bs 8 times daily due to fluctuating blood sugars  - foot exam done 9/19  - microalbumin was 69 in 7/10 up to 392 in March 2011 and 621 in May down to 216 in October 2011 and 37.3 in 4/14 and up to 183 in 12/15 and 300 in 9/16, down to 85 in 4/18 and 10.2 in 9/19 and 3.8 in 12/19  - [facility] 3/18--due now  - check A1c and cmp now  - check POC A1c at next visit       2) HTN NOS (401.9): Blood pressure was at goal < 140/90 at last visit  - cont current regimen      3) Hyperlipidemia (272.4): Given DM, goal LDL is < 100, non-HDL < 130, and TGs < 150. LDL was 146 in January 2010, down to 124 in July and 93 in March 2011 with taking 5 mg of crestor daily. Her crestor was changed to pravastatin by Dr. Nalini Avila because of cost in Feb 2012. LDL 93 in 8/12 but her HDL was high at 138 due to ETOH intake. LDL down to 39 in 11/12 but up to 102 in 3/13. Off pravastatin at this time and previously was on 10 mg daily.  in 1/14. Up to 167 in 4/14 so started lovastatin 20 mg daily but she couldn't afford this. She has been started on crestor 40 mg daily by crossover clinic and LDL 64 in 10/14 and 55 in 1/15. Was 37 in 3/16 so dose decreased to 20 mg daily and LDL 23 in 2/17. Was changed to lipitor 1/2 of 80 mg daily when clinic couldn't get crestor any longer and LDL 37 in 9/17. This was stopped during her hospital stay in 11/17 but was restarted by Dr. Mario Sicard in 12/17 and 42 in 4/18. Up to 94 in 8/18. Down to 79 in 1/19 and 88 in 9/19 and 78 in 12/19  - cont lipitor 40 mg daily  - check lipids now      4) Elevated LFTs (790.6): I told her previously that her LFTs were elevated likely due to ETOH intake so I advised her to cut back on this and her repeat LFTs were normal in 3/13 and 1/14 and 4/14 and 10/14 and 1/15 and 12/15 and 3/16. AST up to 76 in 2/17 but back to normal in 5/17 and has been normal ever since so will follow this. 5) Borderline abnormal TFT: TSH 2.1 in 12/11 but up to 4.01 in 11/12 and 4.75 in 11/17 during 2 hospital stays. Up to 5.45 in 1/19 with Dr. Mario Sicard. Repeat TSH 1.24 and FT4 0.95 and TPO ab 21 in 2/19 so held on any treatment.    TSH up to 3.36 in 9/19 but down to 2.61 in 12/19  - check TSH now      Patient Instructions   1) Go and have your labs drawn now using the order I put in the lab [employer] system. I have been recommending making an appointment online or over the phone for Josue Srinivasan and all my patients said this has worked out very well for them. Please fast for your labs. Don't eat anything after midnight. However, drink at least 6-8 oz of water the morning of the lab draw to avoid dehydration and to allow for the tech to find your vein easier. 2)  I will send you a message through [employer] with your lab results. 3) Please come for a follow up visit on 10/6/20 at 9:10am in our [place] office. Follow-up and Dispositions    · Return for 10/6/20 at 9:10am.               Copy sent to:  Dr. Memo Mcqueen via [facility]  Dr. Pamela Dickson    Lab follow up: 7/16/20  Component      Latest Ref Rng & Units 7/14/2020 7/14/2020 7/14/2020 7/14/2020           9:28 AM  9:28 AM  9:28 AM  9:28 AM   Glucose      65 - 99 mg/dL  211 (H)     BUN      6 - 24 mg/dL  14     Creatinine      0.57 - 1.00 mg/dL  0.94     GFR est non-AA      >59 mL/min/1.73  71     GFR est AA      >59 mL/min/1.73  82     BUN/Creatinine ratio      9 - 23  15     Sodium      134 - 144 mmol/L  135     Potassium      3.5 - 5.2 mmol/L  5.0     Chloride      96 - 106 mmol/L  97     CO2      20 - 29 mmol/L  23     Calcium      8.7 - 10.2 mg/dL  9.3     Protein, total      6.0 - 8.5 g/dL  6.6     Albumin      3.8 - 4.8 g/dL  4.2     GLOBULIN, TOTAL      1.5 - 4.5 g/dL  2.4     A-G Ratio      1.2 - 2.2  1.8     Bilirubin, total      0.0 - 1.2 mg/dL  0.3     Alk.  phosphatase      39 - 117 IU/L  105     AST      0 - 40 IU/L  22     ALT      0 - 32 IU/L  20     Cholesterol, total      100 - 199 mg/dL 125      Triglyceride      0 - 149 mg/dL 130      HDL Cholesterol      >39 mg/dL 38 (L)      VLDL, calculated      5 - 40 mg/dL 26      LDL, calculated      0 - 99 mg/dL 61      Hemoglobin A1c, (calculated)      4.8 - 5.6 %   9.4 (H)    Estimated average glucose mg/dL   223    TSH      0.450 - 4.500 uIU/mL    1.590     Sent her the following message through [employer]:  Hemoglobin A1c is a 3 month marker of your diabetes control. Goal is less than 7%. Yours is 9.4% which is slightly better than 9.5% in 12/19. Continue to work on your diet and exercise and take all your medications as directed.  -------------------------------------------------------------------------------------------------------------------  Total Cholesterol is the total number of cholesterol particles in your blood. Goal is less than 200. Triglycerides are the short term fats in your blood. Goal is less than 150. HDL is the good cholesterol in your blood. Goal is more than 50 if you are a woman and 40 if you are a man but I'm not concerned as your total cholesterol is so low. LDL is the bad cholesterol in your blood. Goal is less than 100 unless you have heart disease and then goal is under 70. Continue to follow a low cholesterol diet. Try to limit the amount of fried foods, fatty foods, butter, gravy, red meat, ice cream, cheese, and eggs in your diet, which are all high in cholesterol.  -------------------------------------------------------------------------------------------------------------------  BUN and creatinine are markers of kidney function. Your values are normal.  -------------------------------------------------------------------------------------------------------------------  ALT and AST are markers of liver function. Your values are normal.  -------------------------------------------------------------------------------------------------------------------  TSH is a thyroid test.  Your level is normal so you don't have any problem with your thyroid function at this time that needs further evaluation or treatment.

## 2020-07-07 NOTE — PATIENT INSTRUCTIONS
1) Go and have your labs drawn now using the order I put in the lab [employer] system. I have been recommending making an appointment online or over the phone for Josue Srinivasan and all my patients said this has worked out very well for them. Please fast for your labs. Don't eat anything after midnight. However, drink at least 6-8 oz of water the morning of the lab draw to avoid dehydration and to allow for the tech to find your vein easier. 2)  I will send you a message through [employer] with your lab results. 3) Please come for a follow up visit on 10/6/20 at 9:10am in our [place] office.

## 2020-07-14 LAB — CREATININE, EXTERNAL: 1.13

## 2020-07-15 LAB
ALBUMIN SERPL-MCNC: 4.2 G/DL (ref 3.8–4.8)
ALBUMIN/GLOB SERPL: 1.8 {RATIO} (ref 1.2–2.2)
ALP SERPL-CCNC: 105 IU/L (ref 39–117)
ALT SERPL-CCNC: 20 IU/L (ref 0–32)
AST SERPL-CCNC: 22 IU/L (ref 0–40)
BILIRUB SERPL-MCNC: 0.3 MG/DL (ref 0–1.2)
BUN SERPL-MCNC: 14 MG/DL (ref 6–24)
BUN/CREAT SERPL: 15 (ref 9–23)
CALCIUM SERPL-MCNC: 9.3 MG/DL (ref 8.7–10.2)
CHLORIDE SERPL-SCNC: 97 MMOL/L (ref 96–106)
CHOLEST SERPL-MCNC: 125 MG/DL (ref 100–199)
CO2 SERPL-SCNC: 23 MMOL/L (ref 20–29)
CREAT SERPL-MCNC: 0.94 MG/DL (ref 0.57–1)
EST. AVERAGE GLUCOSE BLD GHB EST-MCNC: 223 MG/DL
GLOBULIN SER CALC-MCNC: 2.4 G/DL (ref 1.5–4.5)
GLUCOSE SERPL-MCNC: 211 MG/DL (ref 65–99)
HBA1C MFR BLD: 9.4 % (ref 4.8–5.6)
HDLC SERPL-MCNC: 38 MG/DL
INTERPRETATION, 910389: NORMAL
LDLC SERPL CALC-MCNC: 61 MG/DL (ref 0–99)
Lab: NORMAL
POTASSIUM SERPL-SCNC: 5 MMOL/L (ref 3.5–5.2)
PROT SERPL-MCNC: 6.6 G/DL (ref 6–8.5)
SODIUM SERPL-SCNC: 135 MMOL/L (ref 134–144)
TRIGL SERPL-MCNC: 130 MG/DL (ref 0–149)
TSH SERPL DL<=0.005 MIU/L-ACNC: 1.59 UIU/ML (ref 0.45–4.5)
VLDLC SERPL CALC-MCNC: 26 MG/DL (ref 5–40)

## 2020-07-18 RX ORDER — GABAPENTIN 300 MG/1
CAPSULE ORAL
Qty: 180 CAP | Refills: 5 | Status: SHIPPED | OUTPATIENT
Start: 2020-07-18 | End: 2021-01-19 | Stop reason: CLARIF

## 2020-07-26 ENCOUNTER — TELEPHONE (OUTPATIENT)
Dept: ENDOCRINOLOGY | Age: 50
End: 2020-07-26

## 2020-07-26 RX ORDER — BUMETANIDE 2 MG/1
2 TABLET ORAL 2 TIMES DAILY
COMMUNITY
Start: 2020-07-26

## 2020-07-26 NOTE — TELEPHONE ENCOUNTER
Received a page from pt. She notes that she saw her cardiologist,  on Friday and he sent her to the ED at [facility] for swelling, redness and \"weaping\" in her leg. She was admitted, diagnosed with cellulitis and discharged home today. She called because she was unclear of what cellulitis was and how to prevent it from occurring again. I explained to her what cellulitis was and that it was very important to keep her BGs under a tight control to help reduce the risk for recurrence. She notes that she will was recently diagnosed with PFO and \"poor veins in my legs\". On 7/31/20 she will be having an evaluation to see if she is a candidate for catheter repair of her PFO of \"if they are going to have to crack open my chest\". She thanked me for the information and ended the call.

## 2020-07-28 RX ORDER — PEN NEEDLE, DIABETIC 31 GX3/16"
NEEDLE, DISPOSABLE MISCELLANEOUS
Qty: 200 PEN NEEDLE | Refills: 11 | Status: SHIPPED | OUTPATIENT
Start: 2020-07-28 | End: 2021-01-19 | Stop reason: CLARIF

## 2020-07-28 RX ORDER — NAPROXEN SODIUM 220 MG
TABLET ORAL
Qty: 100 SYRINGE | Refills: 11 | Status: SHIPPED | OUTPATIENT
Start: 2020-07-28 | End: 2021-01-19 | Stop reason: CLARIF

## 2020-07-30 ENCOUNTER — TELEPHONE (OUTPATIENT)
Dept: ENDOCRINOLOGY | Age: 50
End: 2020-07-30

## 2020-07-30 NOTE — PROGRESS NOTES
Patient paged this Thursday evening noting she will be having an endoscopy tomorrow and wants to notify me she will run her basal at 50% through the procedure. Reviewing her chart, she is noted for an A1c  of 9.4% while she reports sugars mostly over 200 where she is comfortable. In light of this I suggested running at 75% of her typical basal, however she is worried about her sugar getting low enough to make her uncomfortable during th e procedure. This will be ok at her 50% temporarily however she may need to work on getting comfortable with lower average sugars, which will certainly take some time. Welcome to call with concerns.

## 2020-08-15 RX ORDER — CLOPIDOGREL BISULFATE 75 MG/1
75 TABLET ORAL DAILY
COMMUNITY
Start: 2020-08-15 | End: 2021-07-28

## 2020-08-29 NOTE — LETTER
8/31/2020 Juan [address] Mckeon [address] [facility] [address]   [address] [id number] Dear Ms. Kalie Sheldon, Med Refill Appointment Needed: We have received a medication renewal request for you but have been unable to reach you by phone to discuss this further. Without office visits and appropriate monitoring, your healthcare provider cannot be sure that the medication they are prescribing is treating your conditions effectively. Please contact the office at the number above to schedule a follow up visit. Sincerely, Savannah Powers III, DO Be aware that although these visits are important to keeping you well, your visit may be subject to fees such as co-pays, deductibles, etc.  Please contact your insurance carrier for your specific plan details if you are unsure.

## 2020-08-30 RX ORDER — ATORVASTATIN CALCIUM 40 MG/1
TABLET, FILM COATED ORAL
Qty: 90 TAB | Refills: 2 | OUTPATIENT
Start: 2020-08-30

## 2020-09-10 RX ORDER — PROMETHAZINE HYDROCHLORIDE 25 MG/1
TABLET ORAL
Qty: 30 TAB | Refills: 2 | Status: SHIPPED | OUTPATIENT
Start: 2020-09-10 | End: 2022-04-13 | Stop reason: ALTCHOICE

## 2020-09-10 RX ORDER — FLUTICASONE PROPIONATE 50 MCG
2 SPRAY, SUSPENSION (ML) NASAL
Qty: 16 G | Refills: 3 | Status: SHIPPED | OUTPATIENT
Start: 2020-09-10

## 2020-09-10 NOTE — TELEPHONE ENCOUNTER
Future Appointments:  Future Appointments   Date Time Provider Wei Jean   10/6/2020  9:10 AM Praveena Finney MD [facility] [address] [facility]        Last Appointment With Me:  Visit date not found     Requested Prescriptions     Pending Prescriptions Disp Refills    fluticasone propionate (FLONASE) 50 mcg/actuation nasal spray 16 g 3

## 2020-09-11 RX ORDER — INSULIN LISPRO 100 [IU]/ML
INJECTION, SOLUTION INTRAVENOUS; SUBCUTANEOUS
Qty: 3 VIAL | Refills: 11 | Status: SHIPPED | OUTPATIENT
Start: 2020-09-11 | End: 2020-10-06

## 2020-09-14 ENCOUNTER — TELEPHONE (OUTPATIENT)
Dept: ENDOCRINOLOGY | Age: 50
End: 2020-09-14

## 2020-09-14 NOTE — TELEPHONE ENCOUNTER
----- Message from Nichole Burdick sent at 9/14/2020 12:52 PM EDT -----  Regarding: RE: Prescription Question  Contact: [phone]  NELLA ledesma is covering

## 2020-09-14 NOTE — TELEPHONE ENCOUNTER
Called and spoke with the pharmacist at [facility], who told me that novolog does not appear to be covered and she does have my prescription for humalog vials and has the dx code to run through her DME portion under part B and is pretty sure this should go through but is having trouble with her system right now. I gave her my cell phone to call me back if she has any trouble but otherwise if it goes through, she will notify the patient when it's ready for . I spoke with Adryan Love and gave her this update and she is currently using her pump at a 50% basal to make it last longer and knows to expect a call from the pharmacy this afternoon.

## 2020-09-15 ENCOUNTER — TELEPHONE (OUTPATIENT)
Dept: ENDOCRINOLOGY | Age: 50
End: 2020-09-15

## 2020-09-15 NOTE — TELEPHONE ENCOUNTER
Called and spoke with pt at 4:35pm today. She states after her venous procedure yesterday on her legs she started developing more pain and contacted the doctor who did the procedure and he was concerned she could be having an allergic reaction and called in a medrol dose pack and keflex. She took her first dose of medrol last night and then took another dose this morning. She never had any redness or swelling or itching of her legs to suggest an allergic reaction and I advised her to stop the medrol immediately. She had been running a 50% temp basal until she was able to get a bottle of insulin from [facility] today and then increased her temp basal to 190%. I told her that she likely developed such high sugars due to the 2 doses of steroids and having less basal insulin on board and now she had become dehydrated. I advised her to drink as much water as possible tonight to flush out her system and then check her sugar again at 6pm and every 3 hours after that at 9pm and midnight and give another dose of correction. she voiced understanding of this plan.

## 2020-09-15 NOTE — TELEPHONE ENCOUNTER
Pt called stating she was given some steroids and now her BS is running at 646. Pt used 17.5 bolus on her pump but she is still at 600. Pt states pump is running at 190%. Pt states her ketones are >160. Pt states she does not feel well. Please call [phone].

## 2020-09-16 ENCOUNTER — HOSPITAL ENCOUNTER (EMERGENCY)
Age: 50
Discharge: HOME | End: 2020-09-16
Attending: STUDENT IN AN ORGANIZED HEALTH CARE EDUCATION/TRAINING PROGRAM
Payer: MEDICARE

## 2020-09-16 ENCOUNTER — TELEPHONE (OUTPATIENT)
Dept: ENDOCRINOLOGY | Age: 50
End: 2020-09-16

## 2020-09-16 ENCOUNTER — APPOINTMENT (OUTPATIENT)
Dept: GENERAL RADIOLOGY | Age: 50
End: 2020-09-16
Attending: STUDENT IN AN ORGANIZED HEALTH CARE EDUCATION/TRAINING PROGRAM
Payer: MEDICARE

## 2020-09-16 VITALS
DIASTOLIC BLOOD PRESSURE: 53 MMHG | TEMPERATURE: 98.4 F | RESPIRATION RATE: 21 BRPM | WEIGHT: 212 LBS | OXYGEN SATURATION: 91 % | SYSTOLIC BLOOD PRESSURE: 107 MMHG | HEART RATE: 74 BPM | HEIGHT: 64 IN | BODY MASS INDEX: 36.19 KG/M2

## 2020-09-16 DIAGNOSIS — R73.9 HYPERGLYCEMIA: Primary | ICD-10-CM

## 2020-09-16 LAB
ALBUMIN SERPL-MCNC: 3.4 G/DL (ref 3.5–5)
ALBUMIN/GLOB SERPL: 1 {RATIO} (ref 1.1–2.2)
ALP SERPL-CCNC: 114 U/L (ref 45–117)
ALT SERPL-CCNC: 21 U/L (ref 12–78)
ANION GAP SERPL CALC-SCNC: 5 MMOL/L (ref 5–15)
APPEARANCE UR: CLEAR
ARTERIAL PATENCY WRIST A: ABNORMAL
AST SERPL-CCNC: 15 U/L (ref 15–37)
B-OH-BUTYR SERPL-SCNC: 0.13 MMOL/L
BASE EXCESS BLD CALC-SCNC: 3 MMOL/L
BASOPHILS # BLD: 0.1 K/UL (ref 0–0.1)
BASOPHILS NFR BLD: 1 % (ref 0–1)
BDY SITE: ABNORMAL
BILIRUB SERPL-MCNC: 0.4 MG/DL (ref 0.2–1)
BILIRUB UR QL: NEGATIVE
BUN SERPL-MCNC: 33 MG/DL (ref 6–20)
BUN/CREAT SERPL: 17 (ref 12–20)
CA-I BLD-SCNC: 1.14 MMOL/L (ref 1.12–1.32)
CALCIUM SERPL-MCNC: 8.7 MG/DL (ref 8.5–10.1)
CHLORIDE SERPL-SCNC: 91 MMOL/L (ref 97–108)
CO2 SERPL-SCNC: 32 MMOL/L (ref 21–32)
COLOR UR: ABNORMAL
CREAT SERPL-MCNC: 1.95 MG/DL (ref 0.55–1.02)
DIFFERENTIAL METHOD BLD: ABNORMAL
EOSINOPHIL # BLD: 0.1 K/UL (ref 0–0.4)
EOSINOPHIL NFR BLD: 1 % (ref 0–7)
ERYTHROCYTE [DISTWIDTH] IN BLOOD BY AUTOMATED COUNT: 14.8 % (ref 11.5–14.5)
GAS FLOW.O2 O2 DELIVERY SYS: ABNORMAL L/MIN
GLOBULIN SER CALC-MCNC: 3.5 G/DL (ref 2–4)
GLUCOSE BLD STRIP.AUTO-MCNC: 193 MG/DL (ref 65–100)
GLUCOSE BLD STRIP.AUTO-MCNC: 513 MG/DL (ref 65–100)
GLUCOSE SERPL-MCNC: 447 MG/DL (ref 65–100)
GLUCOSE UR STRIP.AUTO-MCNC: >1000 MG/DL
HCO3 BLD-SCNC: 28.2 MMOL/L (ref 22–26)
HCT VFR BLD AUTO: 35.3 % (ref 35–47)
HGB BLD-MCNC: 11.8 G/DL (ref 11.5–16)
HGB UR QL STRIP: NEGATIVE
IMM GRANULOCYTES # BLD AUTO: 0.1 K/UL (ref 0–0.04)
IMM GRANULOCYTES NFR BLD AUTO: 1 % (ref 0–0.5)
KETONES UR QL STRIP.AUTO: NEGATIVE MG/DL
LEUKOCYTE ESTERASE UR QL STRIP.AUTO: NEGATIVE
LYMPHOCYTES # BLD: 3.4 K/UL (ref 0.8–3.5)
LYMPHOCYTES NFR BLD: 30 % (ref 12–49)
MAGNESIUM SERPL-MCNC: 2.1 MG/DL (ref 1.6–2.4)
MCH RBC QN AUTO: 28.6 PG (ref 26–34)
MCHC RBC AUTO-ENTMCNC: 33.4 G/DL (ref 30–36.5)
MCV RBC AUTO: 85.7 FL (ref 80–99)
MONOCYTES # BLD: 0.7 K/UL (ref 0–1)
MONOCYTES NFR BLD: 6 % (ref 5–13)
NEUTS SEG # BLD: 6.9 K/UL (ref 1.8–8)
NEUTS SEG NFR BLD: 61 % (ref 32–75)
NITRITE UR QL STRIP.AUTO: NEGATIVE
NRBC # BLD: 0 K/UL (ref 0–0.01)
NRBC BLD-RTO: 0 PER 100 WBC
PCO2 BLD: 47.4 MMHG (ref 35–45)
PH BLD: 7.38 [PH] (ref 7.35–7.45)
PH UR STRIP: 5 [PH] (ref 5–8)
PHOSPHATE SERPL-MCNC: 2.9 MG/DL (ref 2.6–4.7)
PLATELET # BLD AUTO: 300 K/UL (ref 150–400)
PMV BLD AUTO: 9.8 FL (ref 8.9–12.9)
PO2 BLD: 38 MMHG (ref 80–100)
POTASSIUM SERPL-SCNC: 3.9 MMOL/L (ref 3.5–5.1)
PROT SERPL-MCNC: 6.9 G/DL (ref 6.4–8.2)
PROT UR STRIP-MCNC: NEGATIVE MG/DL
RBC # BLD AUTO: 4.12 M/UL (ref 3.8–5.2)
SAO2 % BLD: 71 % (ref 92–97)
SERVICE CMNT-IMP: ABNORMAL
SERVICE CMNT-IMP: ABNORMAL
SODIUM SERPL-SCNC: 128 MMOL/L (ref 136–145)
SP GR UR REFRACTOMETRY: 1.02 (ref 1–1.03)
SPECIMEN TYPE: ABNORMAL
TOTAL RESP. RATE, ITRR: 23
UROBILINOGEN UR QL STRIP.AUTO: 0.2 EU/DL (ref 0.2–1)
WBC # BLD AUTO: 11.2 K/UL (ref 3.6–11)

## 2020-09-16 PROCEDURE — 82962 GLUCOSE BLOOD TEST: CPT

## 2020-09-16 PROCEDURE — 85025 COMPLETE CBC W/AUTO DIFF WBC: CPT

## 2020-09-16 PROCEDURE — 71045 X-RAY EXAM CHEST 1 VIEW: CPT

## 2020-09-16 PROCEDURE — 83735 ASSAY OF MAGNESIUM: CPT

## 2020-09-16 PROCEDURE — 81003 URINALYSIS AUTO W/O SCOPE: CPT

## 2020-09-16 PROCEDURE — 74011250637 HC RX REV CODE- 250/637: Performed by: STUDENT IN AN ORGANIZED HEALTH CARE EDUCATION/TRAINING PROGRAM

## 2020-09-16 PROCEDURE — 74011250636 HC RX REV CODE- 250/636: Performed by: STUDENT IN AN ORGANIZED HEALTH CARE EDUCATION/TRAINING PROGRAM

## 2020-09-16 PROCEDURE — 93005 ELECTROCARDIOGRAM TRACING: CPT

## 2020-09-16 PROCEDURE — 84100 ASSAY OF PHOSPHORUS: CPT

## 2020-09-16 PROCEDURE — 80053 COMPREHEN METABOLIC PANEL: CPT

## 2020-09-16 PROCEDURE — 74011636637 HC RX REV CODE- 636/637: Performed by: STUDENT IN AN ORGANIZED HEALTH CARE EDUCATION/TRAINING PROGRAM

## 2020-09-16 PROCEDURE — 82803 BLOOD GASES ANY COMBINATION: CPT

## 2020-09-16 PROCEDURE — 36415 COLL VENOUS BLD VENIPUNCTURE: CPT

## 2020-09-16 PROCEDURE — 99285 EMERGENCY DEPT VISIT HI MDM: CPT

## 2020-09-16 PROCEDURE — 82010 KETONE BODYS QUAN: CPT

## 2020-09-16 RX ORDER — HYDROCODONE BITARTRATE AND ACETAMINOPHEN 5; 325 MG/1; MG/1
1 TABLET ORAL
Status: COMPLETED | OUTPATIENT
Start: 2020-09-16 | End: 2020-09-16

## 2020-09-16 RX ADMIN — HYDROCODONE BITARTRATE AND ACETAMINOPHEN 1 TABLET: 5; 325 TABLET ORAL at 18:40

## 2020-09-16 RX ADMIN — SODIUM CHLORIDE 1000 ML: 900 INJECTION, SOLUTION INTRAVENOUS at 18:00

## 2020-09-16 RX ADMIN — HUMAN INSULIN 6 UNITS: 100 INJECTION, SOLUTION SUBCUTANEOUS at 19:12

## 2020-09-16 NOTE — TELEPHONE ENCOUNTER
Called and spoke with and she stated that she spoke with Dr Sudarshan Dorado and was told that if her bs stayed over 600 to go to the ER. Pt stated that she's going go to the Er but she is going to wait until 5. Due to how the pt sounded while on the phone, she was encouraged not to wait until 5, but go to the ER now. Pt agreed and said that she felt horrible. Pt was then encouraged not drive alone and if she did not have anyone to drive her to the ER, to call 911. Pt voiced understanding and said that she was call 911 now.

## 2020-09-16 NOTE — TELEPHONE ENCOUNTER
----- Message from Rob Galicia sent at 9/16/2020  3:59 PM EDT -----  Regarding: Dr Solange Ahuja 1/Escalated Issue      Caller's first and last name and relationship (if not the patient):      Best contact number(s):[phone]      What are the symptoms:sugar levels running over 600, have a foggy feeling and frequent urination      Transfer successful - yes/no (include outcome):no, no answer      Transfer declined - yes/no (include reason):no, no answer       Was caller advised to seek appropriate level of care - yes/no: yes      Details to clarify the request:        Rob Galicia

## 2020-09-17 ENCOUNTER — PATIENT OUTREACH (OUTPATIENT)
Dept: CASE MANAGEMENT | Age: 50
End: 2020-09-17

## 2020-09-17 LAB
ATRIAL RATE: 76 BPM
CALCULATED P AXIS, ECG09: 50 DEGREES
CALCULATED R AXIS, ECG10: -5 DEGREES
CALCULATED T AXIS, ECG11: 38 DEGREES
DIAGNOSIS, 93000: NORMAL
P-R INTERVAL, ECG05: 162 MS
Q-T INTERVAL, ECG07: 376 MS
QRS DURATION, ECG06: 82 MS
QTC CALCULATION (BEZET), ECG08: 423 MS
VENTRICULAR RATE, ECG03: 76 BPM

## 2020-09-17 NOTE — ED PROVIDER NOTES
EMERGENCY DEPARTMENT HISTORY AND PHYSICAL EXAM      Date: 9/16/2020  Patient Name: Carlos Krishna    History of Presenting Illness     Chief Complaint   Patient presents with    High Blood Sugar     per EMS          HPI: Carlos Krishna, 53 y.o. female presents to the ED with cc of hyperglycemia. She has a history of type 1 diabetes, on insulin pump. She was recently put on steroids after a vascular procedure of her right lower extremity, and this has caused her sugars to be she states greater than 600 at home for the past 2 days. She reports nausea, no vomiting, no abdominal pain. No fevers, cough or congestion, no dysuria or hematuria. She denies any chest pain or shortness of breath, otherwise has been feeling well other than being very thirsty and peeing more than usual.          There are no other complaints, changes, or physical findings at this time. PCP: Fredis Montejo, DO    No current facility-administered medications on file prior to encounter. Current Outpatient Medications on File Prior to Encounter   Medication Sig Dispense Refill    insulin lispro (HUMALOG) 100 unit/mL injection USE AS DIRECTED FOR INSULIN PUMP.  UNITS PER DAY. DX E11.65 --replaces novolog 3 Vial 11    promethazine (PHENERGAN) 25 mg tablet TAKE ONE TABLET BY MOUTH EVERY 6 HOURS AS NEEDED FOR NAUSEA 30 Tab 2    fluticasone propionate (FLONASE) 50 mcg/actuation nasal spray 2 Sprays by Both Nostrils route daily as needed for Rhinitis. 16 g 3    clopidogreL (Plavix) 75 mg tab Take 1 Tab by mouth daily.  Insulin Syringe-Needle U-100 (BD Insulin Syringe Ultra-Fine) 0.5 mL 31 gauge x 5/16\" syrg USE AS DIRECTED UP TO 4 TIMES DAILY 100 Syringe 11    Insulin Needles, Disposable, (BD Ultra-Fine Mini Pen Needle) 31 gauge x 3/16\" ndle USE AS DIRECTED UP TO 8 TIMES DAILY 200 Pen Needle 11    bumetanide (BUMEX) 1 mg tablet Take 1 Tab by mouth two (2) times a day.       gabapentin (NEURONTIN) 300 mg capsule TAKE TWO CAPSULES BY MOUTH THREE TIMES A  Cap 5    spironolactone (ALDACTONE) 25 mg tablet Take  by mouth daily.  glucagon (Glucagon Emergency Kit, human,) 1 mg injection USE AS DIRECTED 2 Vial 11    Contour Next Test Strips strip USE AS DIRECTED TO TEST UP TO 8 TIMES PER DAY--DX E10.65 250 Strip 11    carvedilol (COREG) 6.25 mg tablet Take 1 Tab by mouth daily. Delete the 12.5 mg dose from profile 90 Tab 3    albuterol (PROVENTIL HFA, VENTOLIN HFA, PROAIR HFA) 90 mcg/actuation inhaler Take 1-2 Puffs by inhalation every four (4) hours as needed for Wheezing or Shortness of Breath. 1 Inhaler 3    atorvastatin (LIPITOR) 40 mg tablet TAKE ONE TABLET BY MOUTH DAILY 90 Tab 3    Nebulizer & Compressor machine Use q4 hours as needed for wheezing 1 Each 0    albuterol-ipratropium (DUO-NEB) 2.5 mg-0.5 mg/3 ml nebu 3 mL by Nebulization route every four (4) hours as needed (wheezing). 30 Nebule 1    esomeprazole (NEXIUM) 40 mg capsule TAKE ONE CAPSULE BY MOUTH DAILY 30 Cap 11    insulin pump (PATIENT SUPPLIED) misc by SubCUTAneous route as needed.  HYDROcodone-acetaminophen (NORCO)  mg tablet Take 1 Tab by mouth.  LORazepam (ATIVAN) 2 mg tablet Take 2 mg by mouth three (3) times daily.  citalopram (CELEXA) 20 mg tablet Take 40 mg by mouth daily.  traZODone (DESYREL) 50 mg tablet Take 100 mg by mouth nightly.  levonorgestrel (MIRENA) 20 mcg/24 hr (5 years) IUD 1 Device by IntraUTERine route once.  fentaNYL (DURAGESIC) 100 mcg/hr PATCH 1 Patch by TransDERmal route every seventy-two (72) hours.          Past History     Past Medical History:  Past Medical History:   Diagnosis Date    Achilles tendon rupture     along with torn right patellar/femoral ligament    Arthritis     rt. foot    Chronic kidney disease     Chronic pain     abdominal pain    Diabetes (HCC)     IDDM on insulin pump and sensor    DM [facility]     age 26    Endometriosis     s/p ex-lap 4x    Gastric ulcer     GERD (gastroesophageal reflux disease)     Herpes     Other and unspecified hyperlipidemia     Panic attacks     Psychiatric disorder     anxiety, panic attacks    PTSD (post-traumatic stress disorder)     Unspecified essential hypertension        Past Surgical History:  Past Surgical History:   Procedure Laterality Date    HX COLONOSCOPY      HX GYN      2 d&c's    HX GYN      laparoscopies x5 for endometriosis    HX GYN      mirena in place    HX ORTHOPAEDIC  2002    achiles tendon repair,talus repair    HX ORTHOPAEDIC      injections x2 to right shoulder    HX ORTHOPAEDIC Left 02/07/2019    3rd trigger finger release       Family History:  Family History   Problem Relation Age of Onset    Diabetes Mother         diet controlled    Diabetes Father         R foot amupation    Liver Disease Father     Heart Disease Paternal Uncle         MI in his 46s    Stroke Neg Hx        Social History:  Social History     Tobacco Use    Smoking status: Current Every Day Smoker     Packs/day: 1.00     Years: 28.00     Pack years: 28.00     Types: Cigarettes    Smokeless tobacco: Current User    Tobacco comment: Trying to quit   Substance Use Topics    Alcohol use: Not Currently     Comment: a beer every few months    Drug use: No     Types: OTC, Prescription       Allergies: Allergies   Allergen Reactions    Metolazone Other (comments)     Caused hyponatremia    Zocor [Simvastatin] Myalgia    Lisinopril Cough         Review of Systems   no fever  no eye pain  no ear pain  no shortness of breath  no chest pain  no abdominal pain  no dysuria  Reports postsurgical right leg pain  no rash  no lymphadenopathy  no weight loss    Physical Exam   Physical Exam  Constitutional:       Appearance: Normal appearance. She is obese. HENT:      Head: Normocephalic and atraumatic.       Nose: Nose normal.      Mouth/Throat:      Comments: Mucous membranes slightly tacky  Eyes:      Extraocular Movements: Extraocular movements intact. Pupils: Pupils are equal, round, and reactive to light. Neck:      Musculoskeletal: Neck supple. Cardiovascular:      Rate and Rhythm: Normal rate and regular rhythm. Pulses: Normal pulses. Heart sounds: Normal heart sounds. Pulmonary:      Breath sounds: Normal breath sounds. Abdominal:      General: Abdomen is flat. Tenderness: There is no abdominal tenderness. Musculoskeletal:      Comments: Well-healing very small surgical incision of the right lower extremity, no erythema, induration, warmth or significant swelling   Neurological:      General: No focal deficit present. Mental Status: She is alert and oriented to person, place, and time. Psychiatric:         Mood and Affect: Mood normal.         Diagnostic Study Results     Labs -     Recent Results (from the past 24 hour(s))   GLUCOSE, POC    Collection Time: 09/16/20  5:48 PM   Result Value Ref Range    Glucose (POC) 513 (H) 65 - 100 mg/dL    Performed by Yolande Peterson    CBC WITH AUTOMATED DIFF    Collection Time: 09/16/20  5:50 PM   Result Value Ref Range    WBC 11.2 (H) 3.6 - 11.0 K/uL    RBC 4.12 3.80 - 5.20 M/uL    HGB 11.8 11.5 - 16.0 g/dL    HCT 35.3 35.0 - 47.0 %    MCV 85.7 80.0 - 99.0 FL    MCH 28.6 26.0 - 34.0 PG    MCHC 33.4 30.0 - 36.5 g/dL    RDW 14.8 (H) 11.5 - 14.5 %    PLATELET 914 229 - 687 K/uL    MPV 9.8 8.9 - 12.9 FL    NRBC 0.0 0  WBC    ABSOLUTE NRBC 0.00 0.00 - 0.01 K/uL    NEUTROPHILS 61 32 - 75 %    LYMPHOCYTES 30 12 - 49 %    MONOCYTES 6 5 - 13 %    EOSINOPHILS 1 0 - 7 %    BASOPHILS 1 0 - 1 %    IMMATURE GRANULOCYTES 1 (H) 0.0 - 0.5 %    ABS. NEUTROPHILS 6.9 1.8 - 8.0 K/UL    ABS. LYMPHOCYTES 3.4 0.8 - 3.5 K/UL    ABS. MONOCYTES 0.7 0.0 - 1.0 K/UL    ABS. EOSINOPHILS 0.1 0.0 - 0.4 K/UL    ABS. BASOPHILS 0.1 0.0 - 0.1 K/UL    ABS. IMM.  GRANS. 0.1 (H) 0.00 - 0.04 K/UL    DF AUTOMATED     METABOLIC PANEL, COMPREHENSIVE    Collection Time: 09/16/20  5:50 PM   Result Value Ref Range    Sodium 128 (L) 136 - 145 mmol/L    Potassium 3.9 3.5 - 5.1 mmol/L    Chloride 91 (L) 97 - 108 mmol/L    CO2 32 21 - 32 mmol/L    Anion gap 5 5 - 15 mmol/L    Glucose 447 (H) 65 - 100 mg/dL    BUN 33 (H) 6 - 20 MG/DL    Creatinine 1.95 (H) 0.55 - 1.02 MG/DL    BUN/Creatinine ratio 17 12 - 20      GFR est AA 33 (L) >60 ml/min/1.73m2    GFR est non-AA 27 (L) >60 ml/min/1.73m2    Calcium 8.7 8.5 - 10.1 MG/DL    Bilirubin, total 0.4 0.2 - 1.0 MG/DL    ALT (SGPT) 21 12 - 78 U/L    AST (SGOT) 15 15 - 37 U/L    Alk. phosphatase 114 45 - 117 U/L    Protein, total 6.9 6.4 - 8.2 g/dL    Albumin 3.4 (L) 3.5 - 5.0 g/dL    Globulin 3.5 2.0 - 4.0 g/dL    A-G Ratio 1.0 (L) 1.1 - 2.2     BETA-HYDROXYBUTYRATE    Collection Time: 09/16/20  5:50 PM   Result Value Ref Range    B-hydroxybutyrate 0.13 <0.40 mmol/L   MAGNESIUM    Collection Time: 09/16/20  5:50 PM   Result Value Ref Range    Magnesium 2.1 1.6 - 2.4 mg/dL   PHOSPHORUS    Collection Time: 09/16/20  5:50 PM   Result Value Ref Range    Phosphorus 2.9 2.6 - 4.7 MG/DL   POC EG7    Collection Time: 09/16/20  6:15 PM   Result Value Ref Range    Calcium, ionized (POC) 1.14 1.12 - 1.32 mmol/L    pH (POC) 7.38 7.35 - 7.45      pCO2 (POC) 47.4 (H) 35.0 - 45.0 MMHG    pO2 (POC) 38 (LL) 80 - 100 MMHG    HCO3 (POC) 28.2 (H) 22 - 26 MMOL/L    Base excess (POC) 3 mmol/L    sO2 (POC) 71 (L) 92 - 97 %    Site OTHER      Device: ROOM AIR      Allens test (POC) N/A      Specimen type (POC) VENOUS BLOOD      Total resp.  rate 23     URINALYSIS W/ RFLX MICROSCOPIC    Collection Time: 09/16/20  6:37 PM   Result Value Ref Range    Color YELLOW/STRAW      Appearance CLEAR CLEAR      Specific gravity 1.021 1.003 - 1.030      pH (UA) 5.0 5.0 - 8.0      Protein Negative NEG mg/dL    Glucose >1,000 (A) NEG mg/dL    Ketone Negative NEG mg/dL    Bilirubin Negative NEG      Blood Negative NEG      Urobilinogen 0.2 0.2 - 1.0 EU/dL    Nitrites Negative NEG      Leukocyte Esterase Negative NEG     GLUCOSE, POC    Collection Time: 09/16/20  8:40 PM   Result Value Ref Range    Glucose (POC) 193 (H) 65 - 100 mg/dL    Performed by Manuel BUCK        Radiologic Studies -   XR CHEST PORT   Final Result   IMPRESSION: No acute cardiopulmonary disease. CT Results  (Last 48 hours)    None        CXR Results  (Last 48 hours)               09/16/20 1941  XR CHEST PORT Final result    Impression:  IMPRESSION: No acute cardiopulmonary disease. Narrative:  INDICATION: High blood sugar, hypoxia. Portable AP semiupright view of the chest.       Direct comparison made to prior chest x-ray dated November 2019. Cardiomediastinal silhouette is stable. Lungs are clear bilaterally. Pleural   spaces are normal. Osseous structures are intact. Medical Decision Making   I am the first provider for this patient. I reviewed the vital signs, available nursing notes, past medical history, past surgical history, family history and social history. Vital Signs-Reviewed the patient's vital signs. Patient Vitals for the past 24 hrs:   Temp Pulse Resp BP SpO2   09/16/20 2055  74 21 (!) 107/53 91 %   09/16/20 1940  76 17 (!) 108/50 92 %   09/16/20 1745 98.4 °F (36.9 °C) 75 16 (!) 109/55 95 %         Provider Notes (Medical Decision Making):   43-year-old female presenting with hyperglycemia. Known history of type 1 diabetes, this is likely in the setting of steroid use recently. She denies any infectious symptoms, and does seem to be compliant with her insulin. No signs of any infection on exam, she is slightly dehydrated appearing, otherwise nontoxic-appearing. Abdominal exam benign. Concern for possible DKA, electrolyte/metabolic abnormalities in the setting of this. Also likely dehydration. UA is negative for ketones, negative for UTI. VBG does not show any acidosis with pH normal at 7.38.   Bicarb is not low at 28.2. Beta hydroxybutyrate is not elevated. ED Course:     Initial assessment performed. The patients presenting problems have been discussed, and they are in agreement with the care plan formulated and outlined with them. I have encouraged them to ask questions as they arise throughout their visit. No anion gap acidosis on basic metabolic panel, she is hyperglycemic, insulin and fluids are given. On reevaluation, patient is resting comfortably and states that they feel improved. Glucose is now 198. Patient is counseled on supportive care and return precautions. Will return to the ED for any worsening hyperglycemia, any fevers, cough, pain. Will followup with primary care doctor within 3-5 days. Critical Care Time:         Disposition:  Home    PLAN:  1. Discharge Medication List as of 9/16/2020  8:34 PM        2.    Follow-up Information     Follow up With Specialties Details Why Contact Vik Umaña, DO Internal Medicine Call in 2 days  [address] [facility]  [phone]      [facility] EMERGENCY DEPT Emergency Medicine  As needed, If symptoms worsen [address] [place] [address] Drive  [address] [facility]  [phone]        Return to ED if worse     Diagnosis     Clinical Impression: Acute hyperglycemia and type 1 diabetes secondary to steroid use

## 2020-09-17 NOTE — ED NOTES
Patient reported her BG to be 202 per her home glucometer; POC Accucheck 192; notified provider of glucose readings; okay to feed with boxed meal and discharge home.

## 2020-09-17 NOTE — PROGRESS NOTES
9/21/20 Patient resolved from Transition of Care episode on 9/21/20. ACM/CTN was unsuccessful at contacting this patient today. Patient has not had any additional ED or hospital visits. No further outreach scheduled with this CTN/ACM. Episode of Care resolved. Patient has this CTN/ACM contact information if future needs arise. [facility]  9/17/20 ACM attempted to contact patient at numbers provided-left message for call back. FELYB

## 2020-09-17 NOTE — ED NOTES
Patient given printed discharge instructions reviewed by the MD. Patient understands instructions/follow up recommendations. Patient ambulated out of ED in the company of [employer].

## 2020-09-24 RX ORDER — INSULIN ASPART 100 [IU]/ML
INJECTION, SOLUTION INTRAVENOUS; SUBCUTANEOUS
Qty: 30 ML | Refills: 11 | Status: SHIPPED | OUTPATIENT
Start: 2020-09-24 | End: 2021-07-28

## 2020-10-01 ENCOUNTER — TELEPHONE (OUTPATIENT)
Dept: INTERNAL MEDICINE CLINIC | Age: 50
End: 2020-10-01

## 2020-10-01 NOTE — TELEPHONE ENCOUNTER
----- Message from Patrice Grande sent at 10/1/2020  3:11 PM EDT -----  Regarding: Dr. Kvng Whitt first and last name: Edith Grayson  Reason for call: Pt is requesting a call back to get an antibiotic and cough syrup for sinus infection. Callback required yes/no and why: yes  Best contact number(s): [phone]  Details to clarify the request: Pt uses [facility] on [facility]. Phone # is [phone].

## 2020-10-06 ENCOUNTER — VIRTUAL VISIT (OUTPATIENT)
Dept: ENDOCRINOLOGY | Age: 50
End: 2020-10-06
Payer: MEDICARE

## 2020-10-06 DIAGNOSIS — E78.5 HYPERLIPIDEMIA LDL GOAL <100: ICD-10-CM

## 2020-10-06 DIAGNOSIS — I10 ESSENTIAL HYPERTENSION: ICD-10-CM

## 2020-10-06 DIAGNOSIS — R79.89 ELEVATED LFTS: ICD-10-CM

## 2020-10-06 DIAGNOSIS — R94.6 BORDERLINE ABNORMAL THYROID FUNCTION TEST: ICD-10-CM

## 2020-10-06 PROCEDURE — 99214 OFFICE O/P EST MOD 30 MIN: CPT | Performed by: INTERNAL MEDICINE

## 2020-10-06 RX ORDER — BLOOD SUGAR DIAGNOSTIC
STRIP MISCELLANEOUS
Qty: 250 STRIP | Refills: 11 | Status: SHIPPED | OUTPATIENT
Start: 2020-10-06 | End: 2021-01-19 | Stop reason: CLARIF

## 2020-10-06 RX ORDER — ALPRAZOLAM 1 MG/1
1 TABLET ORAL
COMMUNITY
Start: 2020-09-25

## 2020-10-06 NOTE — PROGRESS NOTES
Chief Complaint   Patient presents with    Diabetes     pcp and pharmacy confirmed    Other     [phone] doxy       **THIS IS A VIRTUAL VISIT VIA A VIDEO SYNCHRONOUS DISCUSSION. PATIENT AGREED TO HAVE THEIR CARE DELIVERED OVER A [facility]. ME VIDEO VISIT IN PLACE OF THEIR REGULARLY SCHEDULED OFFICE VISIT**    History of Present Illness: Raymond Lynn is a 53 y.o. female here for follow up of diabetes. Current settings are as follows:  - basal: 12a: 1.55, 4:30a: 1.65, 10p: 1.55  - Carb ratio: 10  - sensitivity: 20  - target: 12a: 130-150, 6a: 100-130, 10p: 130-150  - active insulin time: 3 hours    she has the following indications to continue treatment with freestyle ellen:  1) she has type 1 diabetes and is on an intensive insulin regimen with an insulin pump  2) she tests her blood sugar 8 times per day and makes treatment decisions off her blood sugar readings and her sensor readings  3) she requires adjustments to her insulin pump setttings based on her sensor readings  4) she has benefitted from therapeutic continuous glucose monitoring and I recommend that she continue this  5) she is seen in my office every 3 months     With being back on the freestyle ellen she has been able to keep her sugars under 200 aside from one value over the past few weeks. She did have severe hyperglycemia requiring an ER visit earlier this month after getting a medrol pack from her cardiologist after having pain in her leg after her vein closure procedure but with stopping the steroids, her sugars came back down. Yesterday had another procedure on her leg veins that went well and she ran a temp basal of 80% and this kept her sugar at 120 after the procedure. She is breathing much better after closure of her PFO this summer and no longer needs oxygen nor nebs.       Current Outpatient Medications   Medication Sig    Contour Next Test Strips strip USE AS DIRECTED TO TEST UP TO 8 TIMES PER DAY--DX E10.65    ALPLEORAZozoey Sears) 1 mg tablet three (3) times daily as needed.  insulin aspart U-100 (NovoLOG U-100 Insulin aspart) 100 unit/mL injection USE AS DIRECTED FOR INSULIN PUMP.  UNITS PER DAY. DX E11.65    promethazine (PHENERGAN) 25 mg tablet TAKE ONE TABLET BY MOUTH EVERY 6 HOURS AS NEEDED FOR NAUSEA    fluticasone propionate (FLONASE) 50 mcg/actuation nasal spray 2 Sprays by Both Nostrils route daily as needed for Rhinitis.  clopidogreL (Plavix) 75 mg tab Take 1 Tab by mouth daily.  Insulin Syringe-Needle U-100 (BD Insulin Syringe Ultra-Fine) 0.5 mL 31 gauge x 5/16\" syrg USE AS DIRECTED UP TO 4 TIMES DAILY    Insulin Needles, Disposable, (BD Ultra-Fine Mini Pen Needle) 31 gauge x 3/16\" ndle USE AS DIRECTED UP TO 8 TIMES DAILY    bumetanide (BUMEX) 1 mg tablet Take 1 Tab by mouth two (2) times a day.  gabapentin (NEURONTIN) 300 mg capsule TAKE TWO CAPSULES BY MOUTH THREE TIMES A DAY    spironolactone (ALDACTONE) 25 mg tablet Take  by mouth daily.  glucagon (Glucagon Emergency Kit, human,) 1 mg injection USE AS DIRECTED    carvedilol (COREG) 6.25 mg tablet Take 1 Tab by mouth daily. Delete the 12.5 mg dose from profile    atorvastatin (LIPITOR) 40 mg tablet TAKE ONE TABLET BY MOUTH DAILY    esomeprazole (NEXIUM) 40 mg capsule TAKE ONE CAPSULE BY MOUTH DAILY    insulin pump (PATIENT SUPPLIED) misc by SubCUTAneous route as needed.  citalopram (CELEXA) 20 mg tablet Take 40 mg by mouth daily.  traZODone (DESYREL) 50 mg tablet Take 100 mg by mouth nightly.  levonorgestrel (MIRENA) 20 mcg/24 hr (5 years) IUD 1 Device by IntraUTERine route once.  fentaNYL (DURAGESIC) 100 mcg/hr PATCH 1 Patch by TransDERmal route every seventy-two (72) hours.  HYDROcodone-acetaminophen (NORCO)  mg tablet Take 1 Tab by mouth. No current facility-administered medications for this visit.       Allergies   Allergen Reactions    Metolazone Other (comments)     Caused hyponatremia    Zocor [Simvastatin] Myalgia  Lisinopril Cough     Review of Systems: PER HPI    Physical Examination:  - GENERAL: NCAT, Appears well nourished   - EYES: EOMI, non-icteric, no proptosis   - Ear/Nose/Throat: NCAT, no visible inflammation or masses   - CARDIOVASCULAR: no cyanosis, no visible JVD   - RESPIRATORY: respiratory effort normal without any distress or labored breathing   - MUSCULOSKELETAL: Normal ROM of neck and upper extremities observed   - SKIN: No rash on face  - NEUROLOGIC:  No facial asymmetry (Cranial nerve 7 motor function), No gaze palsy   - PSYCHIATRIC: Normal affect, Normal insight and judgement       Data Reviewed:   - none new for review    Assessment/Plan:     1) 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): Most recent Hgb A1c was 9.4% in 7/20 down from 9.5% in 12/19 down from 10.6% in 9/19 up from 9.3% in 7/19 down from 10.1% in 5/19 up from 10% in 1/19 up from 9.2% in 10/18 up from 9% in 7/18 up from 8.2% in 4/18 down from 8.8% in 11/17 up from 7.4% in 9/17 down from 9.4% in 2/17 up from 9.1% in 11/16 down from 10% in 3/16 up from 9.4% in 12/15 up from 9.2% in 9/15 down from 9.7% in 5/15 down from 10.3% in 1/15 up from 8.9% in 10/14 down from 11.1% in 4/14 up from 9.2% in 1/14 up from 8.6% in March 2013 down from 8.9% in November down from 9.7% in Aug 2012 up from 9.2% in October 2011 up from 8.7% in May down from 9.7% in March up from 9.1% in July 2010 down from 10.2% in January down from 13.7% in October 2009. Her blood sugars sound well controlled and don't see any trends that warrant changes to her settings.   - cont current pump settings   - check bs 8 times daily due to fluctuating blood sugars  - foot exam done 9/19  - microalbumin was 69 in 7/10 up to 392 in March 2011 and 621 in May down to 216 in October 2011 and 37.3 in 4/14 and up to 183 in 12/15 and 300 in 9/16, down to 85 in 4/18 and 10.2 in 9/19 and 3.8 in 12/19  - optho UTD 3/18--due now  - check A1c and cmp and microalbumin prior to next visit      2) HTN NOS (401.9): Blood pressure was at goal < 140/90 at last visit  - cont current regimen      3) Hyperlipidemia (272.4): Given DM, goal LDL is < 100, non-HDL < 130, and TGs < 150. LDL was 146 in January 2010, down to 124 in July and 93 in March 2011 with taking 5 mg of crestor daily. Her crestor was changed to pravastatin by Dr. Eliz Gonzales because of cost in Feb 2012. LDL 93 in 8/12 but her HDL was high at 138 due to ETOH intake. LDL down to 39 in 11/12 but up to 102 in 3/13. Off pravastatin at this time and previously was on 10 mg daily.  in 1/14. Up to 167 in 4/14 so started lovastatin 20 mg daily but she couldn't afford this. She has been started on crestor 40 mg daily by crossover clinic and LDL 64 in 10/14 and 55 in 1/15. Was 37 in 3/16 so dose decreased to 20 mg daily and LDL 23 in 2/17. Was changed to lipitor 1/2 of 80 mg daily when clinic couldn't get crestor any longer and LDL 37 in 9/17. This was stopped during her hospital stay in 11/17 but was restarted by Dr. Marilyn Guevara in 12/17 and 42 in 4/18. Up to 94 in 8/18. Down to 79 in 1/19 and 88 in 9/19 and 78 in 12/19 and 61 in 7/20  - cont lipitor 40 mg daily  - check lipids prior to next visit      4) Elevated LFTs (790.6): I told her previously that her LFTs were elevated likely due to ETOH intake so I advised her to cut back on this and her repeat LFTs were normal in 3/13 and 1/14 and 4/14 and 10/14 and 1/15 and 12/15 and 3/16. AST up to 76 in 2/17 but back to normal in 5/17 and has been normal ever since so will follow this. 5) Borderline abnormal TFT: TSH 2.1 in 12/11 but up to 4.01 in 11/12 and 4.75 in 11/17 during 2 hospital stays. Up to 5.45 in 1/19 with Dr. Marilyn Guevara. Repeat TSH 1.24 and FT4 0.95 and TPO ab 21 in 2/19 so held on any treatment.    TSH up to 3.36 in 9/19 but down to 2.61 in 12/19 and 1.59 in 7/19  - check TSH in 7/21    Follow-up and Dispositions    · Return 1/12/21 at 9:30am.               Copy sent to:  Dr. Victorino Melendez via [facility]  Dr. Enrique Maxwell

## 2020-10-13 ENCOUNTER — PATIENT MESSAGE (OUTPATIENT)
Dept: ENDOCRINOLOGY | Age: 50
End: 2020-10-13

## 2020-10-14 RX ORDER — FLASH GLUCOSE SENSOR
KIT MISCELLANEOUS
Qty: 1 KIT | Refills: 0 | Status: SHIPPED | OUTPATIENT
Start: 2020-10-14 | End: 2021-01-11 | Stop reason: SDUPTHER

## 2020-12-29 DIAGNOSIS — R94.6 BORDERLINE ABNORMAL THYROID FUNCTION TEST: ICD-10-CM

## 2020-12-29 DIAGNOSIS — R79.89 ELEVATED LFTS: ICD-10-CM

## 2020-12-29 DIAGNOSIS — I10 ESSENTIAL HYPERTENSION: ICD-10-CM

## 2020-12-29 DIAGNOSIS — E78.5 HYPERLIPIDEMIA LDL GOAL <100: ICD-10-CM

## 2021-01-07 LAB
ALBUMIN SERPL-MCNC: 4.4 G/DL (ref 3.8–4.8)
ALBUMIN/CREAT UR: <9 MG/G CREAT (ref 0–29)
ALBUMIN/GLOB SERPL: 1.9 {RATIO} (ref 1.2–2.2)
ALP SERPL-CCNC: 126 IU/L (ref 39–117)
ALT SERPL-CCNC: 13 IU/L (ref 0–32)
AST SERPL-CCNC: 19 IU/L (ref 0–40)
BILIRUB SERPL-MCNC: 0.3 MG/DL (ref 0–1.2)
BUN SERPL-MCNC: 27 MG/DL (ref 6–24)
BUN/CREAT SERPL: 17 (ref 9–23)
CALCIUM SERPL-MCNC: 9.3 MG/DL (ref 8.7–10.2)
CHLORIDE SERPL-SCNC: 94 MMOL/L (ref 96–106)
CHOLEST SERPL-MCNC: 123 MG/DL (ref 100–199)
CO2 SERPL-SCNC: 23 MMOL/L (ref 20–29)
CREAT SERPL-MCNC: 1.6 MG/DL (ref 0.57–1)
CREAT UR-MCNC: 32.2 MG/DL
EST. AVERAGE GLUCOSE BLD GHB EST-MCNC: 229 MG/DL
GLOBULIN SER CALC-MCNC: 2.3 G/DL (ref 1.5–4.5)
GLUCOSE SERPL-MCNC: 506 MG/DL (ref 65–99)
HBA1C MFR BLD: 9.6 % (ref 4.8–5.6)
HDLC SERPL-MCNC: 45 MG/DL
INTERPRETATION, 910389: NORMAL
INTERPRETATION: NORMAL
LDLC SERPL CALC-MCNC: 54 MG/DL (ref 0–99)
Lab: NORMAL
MICROALBUMIN UR-MCNC: <3 UG/ML
PDF IMAGE, 910387: NORMAL
POTASSIUM SERPL-SCNC: 5.3 MMOL/L (ref 3.5–5.2)
PROT SERPL-MCNC: 6.7 G/DL (ref 6–8.5)
SODIUM SERPL-SCNC: 132 MMOL/L (ref 134–144)
TRIGL SERPL-MCNC: 139 MG/DL (ref 0–149)
VLDLC SERPL CALC-MCNC: 24 MG/DL (ref 5–40)

## 2021-01-11 RX ORDER — FLASH GLUCOSE SENSOR
KIT MISCELLANEOUS
Qty: 1 KIT | Refills: 0 | Status: SHIPPED | OUTPATIENT
Start: 2021-01-11 | End: 2021-01-19 | Stop reason: CLARIF

## 2021-01-12 ENCOUNTER — VIRTUAL VISIT (OUTPATIENT)
Dept: ENDOCRINOLOGY | Age: 51
End: 2021-01-12
Payer: MEDICARE

## 2021-01-12 DIAGNOSIS — I10 ESSENTIAL HYPERTENSION: ICD-10-CM

## 2021-01-12 DIAGNOSIS — E78.5 HYPERLIPIDEMIA LDL GOAL <100: ICD-10-CM

## 2021-01-12 DIAGNOSIS — R79.89 ELEVATED LFTS: ICD-10-CM

## 2021-01-12 DIAGNOSIS — R94.6 BORDERLINE ABNORMAL THYROID FUNCTION TEST: ICD-10-CM

## 2021-01-12 PROCEDURE — 99214 OFFICE O/P EST MOD 30 MIN: CPT | Performed by: INTERNAL MEDICINE

## 2021-01-12 NOTE — PROGRESS NOTES
Chief Complaint   Patient presents with    Diabetes     doxy [phone]    Other     pcp and pharmacy confirmed       **THIS IS A VIRTUAL VISIT VIA A VIDEO SYNCHRONOUS DISCUSSION. PATIENT AGREED TO HAVE THEIR CARE DELIVERED OVER A [facility]. ME VIDEO VISIT IN PLACE OF THEIR REGULARLY SCHEDULED OFFICE VISIT**    History of Present Illness: Dorina Dennis is a 53 y.o. female here for follow up of diabetes. Current settings are as follows:  - basal: 12a: 1.55, 4:30a: 1.65, 10p: 1.55  - Carb ratio: 10  - sensitivity: 20  - target: 12a: 130-150, 6a: 100-130, 10p: 130-150  - active insulin time: 3 hours    she has the following indications to continue treatment with freestyle qiana:  1) she has type 1 diabetes and is on an intensive insulin regimen with an insulin pump  2) she tests her blood sugar 8 times per day and makes treatment decisions off her blood sugar readings and her sensor readings  3) she requires adjustments to her insulin pump setttings based on her sensor readings  4) she has benefitted from therapeutic continuous glucose monitoring and I recommend that she continue this  5) she is seen in my office every 3 months     Hasn't made any changes to her pump settings. Fasting sugars are around 100. Does still have some spikes into the 200s and occasionally into the 300s. Is finding that she is having more lows after she is eating and has to run a temp basal of 5% for 3-4 hours to prevent this from happening and then can have some rebound highs. Compliant with oral meds. Current Outpatient Medications   Medication Sig    FreeStyle Qiana 14 Day Sensor kit Use as directed every 14 days--dispense 1 sensor to replace one that broke. O#[id number]    Contour Next Test Strips strip USE AS DIRECTED TO TEST UP TO 8 TIMES PER DAY--DX E10.65    ALPRAZolam (XANAX) 1 mg tablet three (3) times daily as needed.     insulin aspart U-100 (NovoLOG U-100 Insulin aspart) 100 unit/mL injection USE AS DIRECTED FOR INSULIN PUMP.  UNITS PER DAY. DX E11.65    promethazine (PHENERGAN) 25 mg tablet TAKE ONE TABLET BY MOUTH EVERY 6 HOURS AS NEEDED FOR NAUSEA    fluticasone propionate (FLONASE) 50 mcg/actuation nasal spray 2 Sprays by Both Nostrils route daily as needed for Rhinitis.  clopidogreL (Plavix) 75 mg tab Take 1 Tab by mouth daily.  Insulin Syringe-Needle U-100 (BD Insulin Syringe Ultra-Fine) 0.5 mL 31 gauge x 5/16\" syrg USE AS DIRECTED UP TO 4 TIMES DAILY    Insulin Needles, Disposable, (BD Ultra-Fine Mini Pen Needle) 31 gauge x 3/16\" ndle USE AS DIRECTED UP TO 8 TIMES DAILY    bumetanide (BUMEX) 1 mg tablet Take 1 Tab by mouth two (2) times a day.  gabapentin (NEURONTIN) 300 mg capsule TAKE TWO CAPSULES BY MOUTH THREE TIMES A DAY    glucagon (Glucagon Emergency Kit, human,) 1 mg injection USE AS DIRECTED    carvedilol (COREG) 6.25 mg tablet Take 1 Tab by mouth daily. Delete the 12.5 mg dose from profile    atorvastatin (LIPITOR) 40 mg tablet TAKE ONE TABLET BY MOUTH DAILY    esomeprazole (NEXIUM) 40 mg capsule TAKE ONE CAPSULE BY MOUTH DAILY    insulin pump (PATIENT SUPPLIED) misc by SubCUTAneous route as needed.  citalopram (CELEXA) 20 mg tablet Take 40 mg by mouth daily.  traZODone (DESYREL) 50 mg tablet Take 100 mg by mouth nightly.  levonorgestrel (MIRENA) 20 mcg/24 hr (5 years) IUD 1 Device by IntraUTERine route once.  fentaNYL (DURAGESIC) 100 mcg/hr PATCH 1 Patch by TransDERmal route every seventy-two (72) hours. No current facility-administered medications for this visit.       Allergies   Allergen Reactions    Metolazone Other (comments)     Caused hyponatremia    Zocor [Simvastatin] Myalgia    Lisinopril Cough    Medrol [Methylprednisolone] Other (comments)     Caused severe hyperglycemia with the medrol pack     Review of Systems: PER HPI    Physical Examination:  - GENERAL: NCAT, Appears well nourished   - EYES: EOMI, non-icteric, no proptosis   - Ear/Nose/Throat: NCAT, no visible inflammation or masses   - CARDIOVASCULAR: no cyanosis, no visible JVD   - RESPIRATORY: respiratory effort normal without any distress or labored breathing   - MUSCULOSKELETAL: Normal ROM of neck and upper extremities observed   - SKIN: No rash on face  - NEUROLOGIC:  No facial asymmetry (Cranial nerve 7 motor function), No gaze palsy   - PSYCHIATRIC: Normal affect, Normal insight and judgement       Data Reviewed:   Component      Latest Ref Rng & Units 1/6/2021 1/6/2021 1/6/2021 1/6/2021          10:29 AM 10:29 AM 10:29 AM 10:29 AM   Glucose      65 - 99 mg/dL 506 (>)      BUN      6 - 24 mg/dL 27 (H)      Creatinine      0.57 - 1.00 mg/dL 1.60 (H)      GFR est non-AA      >59 mL/min/1.73 38 (L)      GFR est AA      >59 mL/min/1.73 43 (L)      BUN/Creatinine ratio      9 - 23 17      Sodium      134 - 144 mmol/L 132 (L)      Potassium      3.5 - 5.2 mmol/L 5.3 (H)      Chloride      96 - 106 mmol/L 94 (L)      CO2      20 - 29 mmol/L 23      Calcium      8.7 - 10.2 mg/dL 9.3      Protein, total      6.0 - 8.5 g/dL 6.7      Albumin      3.8 - 4.8 g/dL 4.4      GLOBULIN, TOTAL      1.5 - 4.5 g/dL 2.3      A-G Ratio      1.2 - 2.2 1.9      Bilirubin, total      0.0 - 1.2 mg/dL 0.3      Alk. phosphatase      39 - 117 IU/L 126 (H)      AST      0 - 40 IU/L 19      ALT      0 - 32 IU/L 13      Cholesterol, total      100 - 199 mg/dL  123     Triglyceride      0 - 149 mg/dL  139     HDL Cholesterol      >39 mg/dL  45     VLDL, calculated      5 - 40 mg/dL  24     LDL, calculated      0 - 99 mg/dL  54     Creatinine, urine      Not Estab. mg/dL    32.2   Microalbumin, urine      Not Estab. ug/mL    <3.0   Microalbumin/Creat. 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):  Most recent Hgb A1c was 9.6% in 1/21 up from 9.4% in 7/20 down from 9.5% in 12/19 down from 10.6% in 9/19 up from 9.3% in 7/19 down from 10.1% in 5/19 up from 10% in 1/19 up from 9.2% in 10/18 up from 9% in 7/18 up from 8.2% in 4/18 down from 8.8% in 11/17 up from 7.4% in 9/17 down from 9.4% in 2/17 up from 9.1% in 11/16 down from 10% in 3/16 up from 9.4% in 12/15 up from 9.2% in 9/15 down from 9.7% in 5/15 down from 10.3% in 1/15 up from 8.9% in 10/14 down from 11.1% in 4/14 up from 9.2% in 1/14 up from 8.6% in March 2013 down from 8.9% in November down from 9.7% in Aug 2012 up from 9.2% in October 2011 up from 8.7% in May down from 9.7% in March up from 9.1% in July 2010 down from 10.2% in January down from 13.7% in October 2009. Made her carb ratio less aggressive to avoid lows after meals. - cont current pump settings aside form change below  - check bs 8 times daily due to fluctuating blood sugars  - foot exam done 9/19  - microalbumin was 69 in 7/10 up to 392 in March 2011 and 621 in May down to 216 in October 2011 and 37.3 in 4/14 and up to 183 in 12/15 and 300 in 9/16, down to 85 in 4/18 and 10.2 in 9/19 and normal ever since, last in 1/21  - optho UTD 3/18--due now  - check A1c and bmp prior to next visit      2) HTN NOS (401.9): Blood pressure was at goal < 140/90 at last visit  - cont current regimen      3) Hyperlipidemia (272.4): Given DM, goal LDL is < 100, non-HDL < 130, and TGs < 150. LDL was 146 in January 2010, down to 124 in July and 93 in March 2011 with taking 5 mg of crestor daily. Her crestor was changed to pravastatin by Dr. Yeison Barnes because of cost in Feb 2012. LDL 93 in 8/12 but her HDL was high at 138 due to ETOH intake. LDL down to 39 in 11/12 but up to 102 in 3/13. Off pravastatin at this time and previously was on 10 mg daily.  in 1/14. Up to 167 in 4/14 so started lovastatin 20 mg daily but she couldn't afford this. She has been started on crestor 40 mg daily by crossover clinic and LDL 64 in 10/14 and 55 in 1/15.   Was 40 in 3/16 so dose decreased to 20 mg daily and LDL 23 in 2/17. Was changed to lipitor 1/2 of 80 mg daily when clinic couldn't get crestor any longer and LDL 37 in 9/17. This was stopped during her hospital stay in 11/17 but was restarted by Dr. Faith Lord in 12/17 and 42 in 4/18. Up to 94 in 8/18. Down to 79 in 1/19 and 88 in 9/19 and 78 in 12/19 and 61 in 7/20 and 54 in 1/21  - cont lipitor 40 mg daily  - check lipids in 1/22      4) Elevated LFTs (790.6): I told her previously that her LFTs were elevated likely due to ETOH intake so I advised her to cut back on this and her repeat LFTs were normal in 3/13 and 1/14 and 4/14 and 10/14 and 1/15 and 12/15 and 3/16. AST up to 76 in 2/17 but back to normal in 5/17 and has been normal ever since so will follow this. 5) Borderline abnormal TFT: TSH 2.1 in 12/11 but up to 4.01 in 11/12 and 4.75 in 11/17 during 2 hospital stays. Up to 5.45 in 1/19 with Dr. Faith Lord. Repeat TSH 1.24 and FT4 0.95 and TPO ab 21 in 2/19 so held on any treatment. TSH up to 3.36 in 9/19 but down to 2.61 in 12/19 and 1.59 in 7/19  - check TSH in 7/21    Patient Instructions   1) Current settings are as follows:  - basal: 12a: 1.55, 4:30a: 1.65, 10p: 1.55  - Carb ratio: 12  - sensitivity: 20  - target: 12a: 130-150, 6a: 100-130, 10p: 130-150  - active insulin time: 3 hours    I made your carb ratio less aggressive at 12 and if you are still having lows after eating, you can change this further to 15.    2) Your BUN and creatinine are markers of kidney function. Your values are improved from 9/20. 3) Your cholesterol is at goal and your liver and urine are normal.    4) Please come for a follow up visit on 4/20/21 at 9:30am in our [address] [place] [address] [place] office.     5) I put an order directly into the [facility] system to repeat your labs in the 1-2 weeks prior to your next visit so just ask for the order under my name and you will receive a courtesy reminder through [employer] to have these drawn.            Follow-up and Dispositions    · Return 4/20/21 at 9:30am.           Copy sent to:  Dr. Chely Delgado via [facility]  Dr. Sandeep Cline

## 2021-01-12 NOTE — PATIENT INSTRUCTIONS
1) Current settings are as follows: 
- basal: 12a: 1.55, 4:30a: 1.65, 10p: 1.55 
- Carb ratio: 12 
- sensitivity: 20 
- target: 12a: 130-150, 6a: 100-130, 10p: 130-150 
- active insulin time: 3 hours I made your carb ratio less aggressive at 12 and if you are still having lows after eating, you can change this further to 15. 
 
2) Your BUN and creatinine are markers of kidney function. Your values are improved from 9/20. 3) Your cholesterol is at goal and your liver and urine are normal. 
 
4) Please come for a follow up visit on 4/20/21 at 9:30am in our [place] office. 5) I put an order directly into the [facility] system to repeat your labs in the 1-2 weeks prior to your next visit so just ask for the order under my name and you will receive a courtesy reminder through [employer] to have these drawn.

## 2021-01-18 ENCOUNTER — APPOINTMENT (OUTPATIENT)
Dept: GENERAL RADIOLOGY | Age: 51
DRG: 177 | End: 2021-01-18
Attending: EMERGENCY MEDICINE
Payer: MEDICARE

## 2021-01-18 ENCOUNTER — HOSPITAL ENCOUNTER (INPATIENT)
Age: 51
LOS: 4 days | Discharge: HOME | DRG: 177 | End: 2021-01-22
Attending: EMERGENCY MEDICINE | Admitting: STUDENT IN AN ORGANIZED HEALTH CARE EDUCATION/TRAINING PROGRAM
Payer: MEDICARE

## 2021-01-18 ENCOUNTER — APPOINTMENT (OUTPATIENT)
Dept: ULTRASOUND IMAGING | Age: 51
DRG: 177 | End: 2021-01-18
Attending: EMERGENCY MEDICINE
Payer: MEDICARE

## 2021-01-18 DIAGNOSIS — J18.9 COMMUNITY ACQUIRED PNEUMONIA OF LEFT LOWER LOBE OF LUNG: ICD-10-CM

## 2021-01-18 DIAGNOSIS — E11.65 UNCONTROLLED TYPE 2 DIABETES MELLITUS WITH HYPERGLYCEMIA (HCC): ICD-10-CM

## 2021-01-18 DIAGNOSIS — J96.01 ACUTE RESPIRATORY FAILURE WITH HYPOXIA (HCC): Primary | ICD-10-CM

## 2021-01-18 DIAGNOSIS — M17.11 ARTHRITIS OF RIGHT KNEE: ICD-10-CM

## 2021-01-18 PROBLEM — A41.9 SEPSIS (HCC): Status: ACTIVE | Noted: 2021-01-18

## 2021-01-18 PROBLEM — J96.00 RESPIRATORY FAILURE, ACUTE (HCC): Status: ACTIVE | Noted: 2021-01-18

## 2021-01-18 LAB
ALBUMIN SERPL-MCNC: 3.6 G/DL (ref 3.5–5)
ALBUMIN/GLOB SERPL: 1.1 {RATIO} (ref 1.1–2.2)
ALP SERPL-CCNC: 108 U/L (ref 45–117)
ALT SERPL-CCNC: 20 U/L (ref 12–78)
ANION GAP SERPL CALC-SCNC: 5 MMOL/L (ref 5–15)
ARTERIAL PATENCY WRIST A: YES
AST SERPL-CCNC: 22 U/L (ref 15–37)
BASE EXCESS BLD CALC-SCNC: 7 MMOL/L
BASOPHILS # BLD: 0 K/UL (ref 0–0.1)
BASOPHILS NFR BLD: 0 % (ref 0–1)
BDY SITE: ABNORMAL
BILIRUB SERPL-MCNC: 0.2 MG/DL (ref 0.2–1)
BNP SERPL-MCNC: 986 PG/ML
BUN SERPL-MCNC: 30 MG/DL (ref 6–20)
BUN/CREAT SERPL: 16 (ref 12–20)
CA-I BLD-SCNC: 1.1 MMOL/L (ref 1.12–1.32)
CALCIUM SERPL-MCNC: 8.1 MG/DL (ref 8.5–10.1)
CHLORIDE SERPL-SCNC: 96 MMOL/L (ref 97–108)
CK SERPL-CCNC: 178 U/L (ref 26–192)
CO2 SERPL-SCNC: 31 MMOL/L (ref 21–32)
COVID-19 RAPID TEST, COVR: NOT DETECTED
CREAT SERPL-MCNC: 1.91 MG/DL (ref 0.55–1.02)
D DIMER PPP FEU-MCNC: 0.39 MG/L FEU (ref 0–0.65)
DIFFERENTIAL METHOD BLD: ABNORMAL
EOSINOPHIL # BLD: 0 K/UL (ref 0–0.4)
EOSINOPHIL NFR BLD: 0 % (ref 0–7)
ERYTHROCYTE [DISTWIDTH] IN BLOOD BY AUTOMATED COUNT: 16.4 % (ref 11.5–14.5)
GAS FLOW.O2 O2 DELIVERY SYS: ABNORMAL L/MIN
GAS FLOW.O2 SETTING OXYMISER: 4 L/M
GLOBULIN SER CALC-MCNC: 3.2 G/DL (ref 2–4)
GLUCOSE BLD STRIP.AUTO-MCNC: 198 MG/DL (ref 65–100)
GLUCOSE BLD STRIP.AUTO-MCNC: 266 MG/DL (ref 65–100)
GLUCOSE BLD STRIP.AUTO-MCNC: 54 MG/DL (ref 65–100)
GLUCOSE BLD STRIP.AUTO-MCNC: 74 MG/DL (ref 65–100)
GLUCOSE SERPL-MCNC: 235 MG/DL (ref 65–100)
HCO3 BLD-SCNC: 33.5 MMOL/L (ref 22–26)
HCT VFR BLD AUTO: 32.5 % (ref 35–47)
HEALTH STATUS, XMCV2T: NORMAL
HGB BLD-MCNC: 10.4 G/DL (ref 11.5–16)
IMM GRANULOCYTES # BLD AUTO: 0 K/UL (ref 0–0.04)
IMM GRANULOCYTES NFR BLD AUTO: 0 % (ref 0–0.5)
LACTATE BLD-SCNC: 0.86 MMOL/L (ref 0.4–2)
LYMPHOCYTES # BLD: 2.3 K/UL (ref 0.8–3.5)
LYMPHOCYTES NFR BLD: 43 % (ref 12–49)
MCH RBC QN AUTO: 27.9 PG (ref 26–34)
MCHC RBC AUTO-ENTMCNC: 32 G/DL (ref 30–36.5)
MCV RBC AUTO: 87.1 FL (ref 80–99)
MONOCYTES # BLD: 0.7 K/UL (ref 0–1)
MONOCYTES NFR BLD: 13 % (ref 5–13)
NEUTS SEG # BLD: 2.2 K/UL (ref 1.8–8)
NEUTS SEG NFR BLD: 44 % (ref 32–75)
NRBC # BLD: 0 K/UL (ref 0–0.01)
NRBC BLD-RTO: 0 PER 100 WBC
PCO2 BLD: 64.7 MMHG (ref 35–45)
PH BLD: 7.32 [PH] (ref 7.35–7.45)
PLATELET # BLD AUTO: 237 K/UL (ref 150–400)
PMV BLD AUTO: 10.2 FL (ref 8.9–12.9)
PO2 BLD: 102 MMHG (ref 80–100)
POTASSIUM SERPL-SCNC: 4.2 MMOL/L (ref 3.5–5.1)
PROT SERPL-MCNC: 6.8 G/DL (ref 6.4–8.2)
RBC # BLD AUTO: 3.73 M/UL (ref 3.8–5.2)
SAO2 % BLD: 97 % (ref 92–97)
SERVICE CMNT-IMP: ABNORMAL
SERVICE CMNT-IMP: NORMAL
SODIUM SERPL-SCNC: 132 MMOL/L (ref 136–145)
SOURCE, COVRS: NORMAL
SPECIMEN SOURCE, FCOV2M: NORMAL
SPECIMEN TYPE, XMCV1T: NORMAL
SPECIMEN TYPE: ABNORMAL
TOTAL RESP. RATE, ITRR: 14
TROPONIN I SERPL-MCNC: <0.05 NG/ML
WBC # BLD AUTO: 5.2 K/UL (ref 3.6–11)

## 2021-01-18 PROCEDURE — 74011636637 HC RX REV CODE- 636/637: Performed by: STUDENT IN AN ORGANIZED HEALTH CARE EDUCATION/TRAINING PROGRAM

## 2021-01-18 PROCEDURE — 85025 COMPLETE CBC W/AUTO DIFF WBC: CPT

## 2021-01-18 PROCEDURE — 96365 THER/PROPH/DIAG IV INF INIT: CPT

## 2021-01-18 PROCEDURE — 77010033678 HC OXYGEN DAILY

## 2021-01-18 PROCEDURE — 36600 WITHDRAWAL OF ARTERIAL BLOOD: CPT

## 2021-01-18 PROCEDURE — 82962 GLUCOSE BLOOD TEST: CPT

## 2021-01-18 PROCEDURE — 82550 ASSAY OF CK (CPK): CPT

## 2021-01-18 PROCEDURE — 96375 TX/PRO/DX INJ NEW DRUG ADDON: CPT

## 2021-01-18 PROCEDURE — 36415 COLL VENOUS BLD VENIPUNCTURE: CPT

## 2021-01-18 PROCEDURE — 74011250636 HC RX REV CODE- 250/636: Performed by: STUDENT IN AN ORGANIZED HEALTH CARE EDUCATION/TRAINING PROGRAM

## 2021-01-18 PROCEDURE — 87635 SARS-COV-2 COVID-19 AMP PRB: CPT

## 2021-01-18 PROCEDURE — 84484 ASSAY OF TROPONIN QUANT: CPT

## 2021-01-18 PROCEDURE — 71045 X-RAY EXAM CHEST 1 VIEW: CPT

## 2021-01-18 PROCEDURE — 74011250636 HC RX REV CODE- 250/636: Performed by: EMERGENCY MEDICINE

## 2021-01-18 PROCEDURE — 74011000258 HC RX REV CODE- 258: Performed by: EMERGENCY MEDICINE

## 2021-01-18 PROCEDURE — 74011250637 HC RX REV CODE- 250/637: Performed by: STUDENT IN AN ORGANIZED HEALTH CARE EDUCATION/TRAINING PROGRAM

## 2021-01-18 PROCEDURE — 96366 THER/PROPH/DIAG IV INF ADDON: CPT

## 2021-01-18 PROCEDURE — 85379 FIBRIN DEGRADATION QUANT: CPT

## 2021-01-18 PROCEDURE — 65660000000 HC RM CCU STEPDOWN

## 2021-01-18 PROCEDURE — 2709999900 HC NON-CHARGEABLE SUPPLY

## 2021-01-18 PROCEDURE — 83880 ASSAY OF NATRIURETIC PEPTIDE: CPT

## 2021-01-18 PROCEDURE — 80053 COMPREHEN METABOLIC PANEL: CPT

## 2021-01-18 PROCEDURE — 93971 EXTREMITY STUDY: CPT

## 2021-01-18 PROCEDURE — 87040 BLOOD CULTURE FOR BACTERIA: CPT

## 2021-01-18 PROCEDURE — 82803 BLOOD GASES ANY COMBINATION: CPT

## 2021-01-18 PROCEDURE — 99285 EMERGENCY DEPT VISIT HI MDM: CPT

## 2021-01-18 PROCEDURE — 74011250637 HC RX REV CODE- 250/637: Performed by: EMERGENCY MEDICINE

## 2021-01-18 PROCEDURE — 83605 ASSAY OF LACTIC ACID: CPT

## 2021-01-18 PROCEDURE — 93005 ELECTROCARDIOGRAM TRACING: CPT

## 2021-01-18 PROCEDURE — 73560 X-RAY EXAM OF KNEE 1 OR 2: CPT

## 2021-01-18 PROCEDURE — 65270000029 HC RM PRIVATE

## 2021-01-18 RX ORDER — SODIUM CHLORIDE 0.9 % (FLUSH) 0.9 %
5-40 SYRINGE (ML) INJECTION EVERY 8 HOURS
Status: DISCONTINUED | OUTPATIENT
Start: 2021-01-18 | End: 2021-01-22 | Stop reason: HOSPADM

## 2021-01-18 RX ORDER — SODIUM CHLORIDE 0.9 % (FLUSH) 0.9 %
5-40 SYRINGE (ML) INJECTION AS NEEDED
Status: DISCONTINUED | OUTPATIENT
Start: 2021-01-18 | End: 2021-01-22 | Stop reason: HOSPADM

## 2021-01-18 RX ORDER — TRAZODONE HYDROCHLORIDE 100 MG/1
100 TABLET ORAL
Status: DISCONTINUED | OUTPATIENT
Start: 2021-01-18 | End: 2021-01-22 | Stop reason: HOSPADM

## 2021-01-18 RX ORDER — INSULIN GLARGINE 100 [IU]/ML
32 INJECTION, SOLUTION SUBCUTANEOUS DAILY
Status: DISCONTINUED | OUTPATIENT
Start: 2021-01-19 | End: 2021-01-19

## 2021-01-18 RX ORDER — BUMETANIDE 1 MG/1
1 TABLET ORAL 2 TIMES DAILY
Status: DISCONTINUED | OUTPATIENT
Start: 2021-01-18 | End: 2021-01-19

## 2021-01-18 RX ORDER — DICLOFENAC SODIUM 10 MG/G
4 GEL TOPICAL 4 TIMES DAILY
Status: DISCONTINUED | OUTPATIENT
Start: 2021-01-18 | End: 2021-01-22 | Stop reason: HOSPADM

## 2021-01-18 RX ORDER — MAGNESIUM SULFATE 100 %
4 CRYSTALS MISCELLANEOUS AS NEEDED
Status: DISCONTINUED | OUTPATIENT
Start: 2021-01-18 | End: 2021-01-22 | Stop reason: HOSPADM

## 2021-01-18 RX ORDER — INSULIN LISPRO 100 [IU]/ML
INJECTION, SOLUTION INTRAVENOUS; SUBCUTANEOUS
Status: DISCONTINUED | OUTPATIENT
Start: 2021-01-18 | End: 2021-01-22 | Stop reason: HOSPADM

## 2021-01-18 RX ORDER — IBUPROFEN 200 MG
1 TABLET ORAL DAILY
Status: DISCONTINUED | OUTPATIENT
Start: 2021-01-19 | End: 2021-01-22 | Stop reason: HOSPADM

## 2021-01-18 RX ORDER — GABAPENTIN 300 MG/1
300 CAPSULE ORAL 2 TIMES DAILY
Status: DISCONTINUED | OUTPATIENT
Start: 2021-01-18 | End: 2021-01-22 | Stop reason: HOSPADM

## 2021-01-18 RX ORDER — ALBUTEROL SULFATE 90 UG/1
2 AEROSOL, METERED RESPIRATORY (INHALATION)
Status: DISCONTINUED | OUTPATIENT
Start: 2021-01-18 | End: 2021-01-20

## 2021-01-18 RX ORDER — PANTOPRAZOLE SODIUM 40 MG/1
40 TABLET, DELAYED RELEASE ORAL
Status: DISCONTINUED | OUTPATIENT
Start: 2021-01-19 | End: 2021-01-22 | Stop reason: HOSPADM

## 2021-01-18 RX ORDER — AZITHROMYCIN 250 MG/1
500 TABLET, FILM COATED ORAL
Status: COMPLETED | OUTPATIENT
Start: 2021-01-18 | End: 2021-01-18

## 2021-01-18 RX ORDER — ONDANSETRON 2 MG/ML
4 INJECTION INTRAMUSCULAR; INTRAVENOUS
Status: COMPLETED | OUTPATIENT
Start: 2021-01-18 | End: 2021-01-18

## 2021-01-18 RX ORDER — PREDNISONE 20 MG/1
40 TABLET ORAL
Status: DISCONTINUED | OUTPATIENT
Start: 2021-01-18 | End: 2021-01-19

## 2021-01-18 RX ORDER — DEXTROSE 50 % IN WATER (D50W) INTRAVENOUS SYRINGE
12.5-25 AS NEEDED
Status: DISCONTINUED | OUTPATIENT
Start: 2021-01-18 | End: 2021-01-22 | Stop reason: HOSPADM

## 2021-01-18 RX ORDER — DEXAMETHASONE SODIUM PHOSPHATE 4 MG/ML
6 INJECTION, SOLUTION INTRA-ARTICULAR; INTRALESIONAL; INTRAMUSCULAR; INTRAVENOUS; SOFT TISSUE EVERY 24 HOURS
Status: DISCONTINUED | OUTPATIENT
Start: 2021-01-18 | End: 2021-01-18

## 2021-01-18 RX ORDER — ONDANSETRON 2 MG/ML
4 INJECTION INTRAMUSCULAR; INTRAVENOUS
Status: DISCONTINUED | OUTPATIENT
Start: 2021-01-18 | End: 2021-01-22 | Stop reason: HOSPADM

## 2021-01-18 RX ORDER — POLYETHYLENE GLYCOL 3350 17 G/17G
17 POWDER, FOR SOLUTION ORAL DAILY PRN
Status: DISCONTINUED | OUTPATIENT
Start: 2021-01-18 | End: 2021-01-22 | Stop reason: HOSPADM

## 2021-01-18 RX ORDER — FLUTICASONE PROPIONATE 50 MCG
2 SPRAY, SUSPENSION (ML) NASAL
Status: DISCONTINUED | OUTPATIENT
Start: 2021-01-18 | End: 2021-01-22 | Stop reason: HOSPADM

## 2021-01-18 RX ORDER — MORPHINE SULFATE 2 MG/ML
2 INJECTION, SOLUTION INTRAMUSCULAR; INTRAVENOUS
Status: COMPLETED | OUTPATIENT
Start: 2021-01-18 | End: 2021-01-18

## 2021-01-18 RX ORDER — ACETAMINOPHEN 325 MG/1
650 TABLET ORAL
Status: DISCONTINUED | OUTPATIENT
Start: 2021-01-18 | End: 2021-01-22 | Stop reason: HOSPADM

## 2021-01-18 RX ORDER — CITALOPRAM 20 MG/1
40 TABLET, FILM COATED ORAL DAILY
Status: DISCONTINUED | OUTPATIENT
Start: 2021-01-19 | End: 2021-01-22 | Stop reason: HOSPADM

## 2021-01-18 RX ORDER — CARVEDILOL 6.25 MG/1
6.25 TABLET ORAL DAILY
Status: DISCONTINUED | OUTPATIENT
Start: 2021-01-19 | End: 2021-01-22 | Stop reason: HOSPADM

## 2021-01-18 RX ORDER — CLOPIDOGREL BISULFATE 75 MG/1
75 TABLET ORAL DAILY
Status: DISCONTINUED | OUTPATIENT
Start: 2021-01-19 | End: 2021-01-22 | Stop reason: HOSPADM

## 2021-01-18 RX ORDER — ACETAMINOPHEN 650 MG/1
650 SUPPOSITORY RECTAL
Status: DISCONTINUED | OUTPATIENT
Start: 2021-01-18 | End: 2021-01-22 | Stop reason: HOSPADM

## 2021-01-18 RX ORDER — PROMETHAZINE HYDROCHLORIDE 25 MG/1
12.5 TABLET ORAL
Status: DISCONTINUED | OUTPATIENT
Start: 2021-01-18 | End: 2021-01-22 | Stop reason: HOSPADM

## 2021-01-18 RX ORDER — ENOXAPARIN SODIUM 100 MG/ML
40 INJECTION SUBCUTANEOUS EVERY 12 HOURS
Status: DISCONTINUED | OUTPATIENT
Start: 2021-01-18 | End: 2021-01-22 | Stop reason: HOSPADM

## 2021-01-18 RX ADMIN — TRAZODONE HYDROCHLORIDE 100 MG: 100 TABLET ORAL at 21:54

## 2021-01-18 RX ADMIN — GABAPENTIN 300 MG: 300 CAPSULE ORAL at 20:10

## 2021-01-18 RX ADMIN — ONDANSETRON 4 MG: 2 INJECTION INTRAMUSCULAR; INTRAVENOUS at 14:26

## 2021-01-18 RX ADMIN — ALBUTEROL SULFATE 2 PUFF: 90 AEROSOL, METERED RESPIRATORY (INHALATION) at 20:11

## 2021-01-18 RX ADMIN — ENOXAPARIN SODIUM 40 MG: 40 INJECTION SUBCUTANEOUS at 20:10

## 2021-01-18 RX ADMIN — Medication 10 ML: at 21:54

## 2021-01-18 RX ADMIN — PREDNISONE 40 MG: 20 TABLET ORAL at 20:10

## 2021-01-18 RX ADMIN — CEFTRIAXONE SODIUM 2 G: 2 INJECTION, POWDER, FOR SOLUTION INTRAMUSCULAR; INTRAVENOUS at 14:58

## 2021-01-18 RX ADMIN — AZITHROMYCIN 500 MG: 250 TABLET, FILM COATED ORAL at 14:58

## 2021-01-18 RX ADMIN — BUMETANIDE 1 MG: 1 TABLET ORAL at 21:53

## 2021-01-18 RX ADMIN — MORPHINE SULFATE 2 MG: 2 INJECTION, SOLUTION INTRAMUSCULAR; INTRAVENOUS at 14:26

## 2021-01-18 NOTE — ED NOTES
Pt arrives to the ED with c/o SOB, pt states she was seen at her PCP for right knee pain and was sent here for possible blood clot and hypoxia on baseline 3L NC    Pt states she has a fentanyl patch in place at this time for right knee pain and endometriosis    Pt placed x3 on monitor, bed in low position, call bell in reach.  Pt a/o x4

## 2021-01-18 NOTE — ED NOTES
Bedside shift change report received from Esha Su Chatmoss nurse) given to ALVARO Cardozo (oncoming nurse). Report included the following information SBAR, Kardex and Recent Results. [id number]- Bedside shift change report given to James Merida RN (oncoming nurse) by ALVARO Cardozo (offgoing nurse). Report included the following information SBAR, Kardex and Recent Results.

## 2021-01-18 NOTE — ED PROVIDER NOTES
EMERGENCY DEPARTMENT HISTORY AND PHYSICAL EXAM      Date: 1/18/2021  Patient Name: Ghassan Cline    History of Presenting Illness     Chief Complaint   Patient presents with    Shortness of Breath     x 3 days    Referral / Consult     sent by Dr. Mireille Tian for low pulse ox in office, concern for blood clot       History Provided By: Patient    HPI: Ghassan Cline, 53 y.o. female presents to the ED with cc of shortness of breath x3 days. Patient has a history of respiratory failure and is on oxygen as needed. She has had increasing shortness of breath for last 3 days, but denies chest pain. She has a cough which is productive of clear sputum. She denies fever or chills. She injured her right knee 1 year ago and was being evaluated by her PCP today for knee pain. Her saturation was noted to be 68% on room air. Her provider sent her in for further work-up. Patient's knee pain is 8 out of 10 in severity currently. She denies any new injury and denies calf pain. There are no other complaints, changes, or physical findings at this time. PCP: Madelyn Holley MD    No current facility-administered medications on file prior to encounter. Current Outpatient Medications on File Prior to Encounter   Medication Sig Dispense Refill    FreeStyle Qiana 14 Day Sensor kit Use as directed every 14 days--dispense 1 sensor to replace one that broke. [facility]#[id number] 1 Kit 0    Contour Next Test Strips strip USE AS DIRECTED TO TEST UP TO 8 TIMES PER DAY--DX E10.65 250 Strip 11    ALPRAZolam (XANAX) 1 mg tablet three (3) times daily as needed.  insulin aspart U-100 (NovoLOG U-100 Insulin aspart) 100 unit/mL injection USE AS DIRECTED FOR INSULIN PUMP.  UNITS PER DAY.   DX E11.65 30 mL 11    promethazine (PHENERGAN) 25 mg tablet TAKE ONE TABLET BY MOUTH EVERY 6 HOURS AS NEEDED FOR NAUSEA 30 Tab 2    fluticasone propionate (FLONASE) 50 mcg/actuation nasal spray 2 Sprays by Both Nostrils route daily as needed for Rhinitis. 16 g 3    clopidogreL (Plavix) 75 mg tab Take 1 Tab by mouth daily.  Insulin Syringe-Needle U-100 (BD Insulin Syringe Ultra-Fine) 0.5 mL 31 gauge x 5/16\" syrg USE AS DIRECTED UP TO 4 TIMES DAILY 100 Syringe 11    Insulin Needles, Disposable, (BD Ultra-Fine Mini Pen Needle) 31 gauge x 3/16\" ndle USE AS DIRECTED UP TO 8 TIMES DAILY 200 Pen Needle 11    bumetanide (BUMEX) 1 mg tablet Take 1 Tab by mouth two (2) times a day.  gabapentin (NEURONTIN) 300 mg capsule TAKE TWO CAPSULES BY MOUTH THREE TIMES A  Cap 5    glucagon (Glucagon Emergency Kit, human,) 1 mg injection USE AS DIRECTED 2 Vial 11    carvedilol (COREG) 6.25 mg tablet Take 1 Tab by mouth daily. Delete the 12.5 mg dose from profile 90 Tab 3    atorvastatin (LIPITOR) 40 mg tablet TAKE ONE TABLET BY MOUTH DAILY 90 Tab 3    esomeprazole (NEXIUM) 40 mg capsule TAKE ONE CAPSULE BY MOUTH DAILY 30 Cap 11    insulin pump (PATIENT SUPPLIED) misc by SubCUTAneous route as needed.  citalopram (CELEXA) 20 mg tablet Take 40 mg by mouth daily.  traZODone (DESYREL) 50 mg tablet Take 100 mg by mouth nightly.  levonorgestrel (MIRENA) 20 mcg/24 hr (5 years) IUD 1 Device by IntraUTERine route once.  fentaNYL (DURAGESIC) 100 mcg/hr PATCH 1 Patch by TransDERmal route every seventy-two (72) hours.          Past History     Past Medical History:  Past Medical History:   Diagnosis Date    Achilles tendon rupture     along with torn right patellar/femoral ligament    Arthritis     rt. foot    Chronic kidney disease     Chronic pain     abdominal pain    Diabetes (HCC)     IDDM on insulin pump and sensor    DM type 1 (diabetes mellitus, type 1) ([facility])     age 25    Endometriosis     s/p ex-lap 4x    Gastric ulcer     GERD (gastroesophageal reflux disease)     Herpes     Other and unspecified hyperlipidemia     Panic attacks     Psychiatric disorder     anxiety, panic attacks    PTSD (post-traumatic stress disorder)     Unspecified essential hypertension        Past Surgical History:  Past Surgical History:   Procedure Laterality Date    HX COLONOSCOPY      HX GYN      2 d&c's    HX GYN      laparoscopies x5 for endometriosis    HX GYN      mirena in place    HX ORTHOPAEDIC  2002    achiles tendon repair,talus repair    HX ORTHOPAEDIC      injections x2 to right shoulder    HX ORTHOPAEDIC Left 02/07/2019    3rd trigger finger release       Family History:  Family History   Problem Relation Age of Onset    Diabetes Mother         diet controlled    Diabetes Father         R foot amupation    Liver Disease Father     Heart Disease Paternal Uncle         MI in his 46s    Stroke Neg Hx        Social History:  Social History     Tobacco Use    Smoking status: Current Every Day Smoker     Packs/day: 1.00     Years: 28.00     Pack years: 28.00     Types: Cigarettes    Smokeless tobacco: Current User   Substance Use Topics    Alcohol use: Not Currently     Comment: a beer every few months    Drug use: No     Types: OTC, Prescription       Allergies: Allergies   Allergen Reactions    Metolazone Other (comments)     Caused hyponatremia    Zocor [Simvastatin] Myalgia    Lisinopril Cough    Medrol [Methylprednisolone] Other (comments)     Caused severe hyperglycemia with the medrol pack         Review of Systems   Review of Systems   Constitutional: Negative for fever. HENT: Negative for congestion. Eyes: Negative. Respiratory: Positive for cough and shortness of breath. Cardiovascular: Negative for chest pain. Gastrointestinal: Negative for abdominal pain. Endocrine: Negative for heat intolerance. Genitourinary: Negative. Musculoskeletal: Negative for back pain. Skin: Negative for rash. Allergic/Immunologic: Negative for immunocompromised state. Neurological: Negative for dizziness. Hematological: Does not bruise/bleed easily. Psychiatric/Behavioral: Negative.     All other systems reviewed and are negative. Physical Exam   Physical Exam  Vitals signs and nursing note reviewed. Constitutional:       General: She is not in acute distress. Appearance: She is well-developed. HENT:      Head: Normocephalic. Neck:      Musculoskeletal: Normal range of motion and neck supple. Cardiovascular:      Rate and Rhythm: Normal rate and regular rhythm. Heart sounds: Normal heart sounds. Pulmonary:      Effort: Pulmonary effort is normal.      Breath sounds: Normal breath sounds. Abdominal:      General: Bowel sounds are normal.      Palpations: Abdomen is soft. Tenderness: There is no abdominal tenderness. Musculoskeletal: Normal range of motion. Right lower leg: She exhibits tenderness. Comments: Right knee tenderness   Skin:     General: Skin is warm and dry. Neurological:      General: No focal deficit present. Mental Status: She is alert and oriented to person, place, and time.    Psychiatric:         Mood and Affect: Mood normal.         Behavior: Behavior normal.         Diagnostic Study Results     Labs -     Recent Results (from the past 12 hour(s))   EKG, 12 LEAD, INITIAL    Collection Time: 01/18/21  1:35 PM   Result Value Ref Range    Ventricular Rate 68 BPM    Atrial Rate 68 BPM    P-R Interval 166 ms    QRS Duration 74 ms    Q-T Interval 376 ms    QTC Calculation (Bezet) 399 ms    Calculated P Axis 31 degrees    Calculated R Axis -21 degrees    Calculated T Axis 5 degrees    Diagnosis       Normal sinus rhythm  Possible Anterior infarct , age undetermined  When compared with ECG of 16-SEP-2020 17:55,  Nonspecific T wave abnormality, worse in Anterior leads     CBC WITH AUTOMATED DIFF    Collection Time: 01/18/21  2:25 PM   Result Value Ref Range    WBC 5.2 3.6 - 11.0 K/uL    RBC 3.73 (L) 3.80 - 5.20 M/uL    HGB 10.4 (L) 11.5 - 16.0 g/dL    HCT 32.5 (L) 35.0 - 47.0 %    MCV 87.1 80.0 - 99.0 FL    MCH 27.9 26.0 - 34.0 PG    MCHC 32.0 30.0 - 36.5 g/dL    RDW 16.4 (H) 11.5 - 14.5 %    PLATELET 961 224 - 864 K/uL    MPV 10.2 8.9 - 12.9 FL    NRBC 0.0 0  WBC    ABSOLUTE NRBC 0.00 0.00 - 0.01 K/uL    NEUTROPHILS 44 32 - 75 %    LYMPHOCYTES 43 12 - 49 %    MONOCYTES 13 5 - 13 %    EOSINOPHILS 0 0 - 7 %    BASOPHILS 0 0 - 1 %    IMMATURE GRANULOCYTES 0 0.0 - 0.5 %    ABS. NEUTROPHILS 2.2 1.8 - 8.0 K/UL    ABS. LYMPHOCYTES 2.3 0.8 - 3.5 K/UL    ABS. MONOCYTES 0.7 0.0 - 1.0 K/UL    ABS. EOSINOPHILS 0.0 0.0 - 0.4 K/UL    ABS. BASOPHILS 0.0 0.0 - 0.1 K/UL    ABS. IMM. GRANS. 0.0 0.00 - 0.04 K/UL    DF AUTOMATED     METABOLIC PANEL, COMPREHENSIVE    Collection Time: 01/18/21  2:25 PM   Result Value Ref Range    Sodium 132 (L) 136 - 145 mmol/L    Potassium 4.2 3.5 - 5.1 mmol/L    Chloride 96 (L) 97 - 108 mmol/L    CO2 31 21 - 32 mmol/L    Anion gap 5 5 - 15 mmol/L    Glucose 235 (H) 65 - 100 mg/dL    BUN 30 (H) 6 - 20 MG/DL    Creatinine 1.91 (H) 0.55 - 1.02 MG/DL    BUN/Creatinine ratio 16 12 - 20      GFR est AA 34 (L) >60 ml/min/1.73m2    GFR est non-AA 28 (L) >60 ml/min/1.73m2    Calcium 8.1 (L) 8.5 - 10.1 MG/DL    Bilirubin, total 0.2 0.2 - 1.0 MG/DL    ALT (SGPT) 20 12 - 78 U/L    AST (SGOT) 22 15 - 37 U/L    Alk.  phosphatase 108 45 - 117 U/L    Protein, total 6.8 6.4 - 8.2 g/dL    Albumin 3.6 3.5 - 5.0 g/dL    Globulin 3.2 2.0 - 4.0 g/dL    A-G Ratio 1.1 1.1 - 2.2     CK W/ REFLX CKMB    Collection Time: 01/18/21  2:25 PM   Result Value Ref Range     26 - 192 U/L   TROPONIN I    Collection Time: 01/18/21  2:25 PM   Result Value Ref Range    Troponin-I, Qt. <0.05 <0.05 ng/mL   D DIMER    Collection Time: 01/18/21  2:25 PM   Result Value Ref Range    D-dimer 0.39 0.00 - 0.65 mg/L FEU   NT-PRO BNP    Collection Time: 01/18/21  2:25 PM   Result Value Ref Range    NT pro- (H) <125 PG/ML   GLUCOSE, POC    Collection Time: 01/18/21  2:32 PM   Result Value Ref Range    Glucose (POC) 266 (H) 65 - 100 mg/dL    Performed by [facility]    [facility] LACTIC ACID    Collection Time: 01/18/21  3:00 PM   Result Value Ref Range    Lactic Acid (POC) 0.86 0.40 - 2.00 mmol/L   SARS-COV-2    Collection Time: 01/18/21  4:44 PM   Result Value Ref Range    Specimen source Nasopharyngeal      Specimen source Nasopharyngeal      COVID-19 rapid test Not detected NOTD      Specimen type NP Swab      Health status Symptomatic Testing     GLUCOSE, POC    Collection Time: 01/18/21  6:07 PM   Result Value Ref Range    Glucose (POC) 54 (L) 65 - 100 mg/dL    Performed by Payal Costa (RN)    GLUCOSE, POC    Collection Time: 01/18/21  6:26 PM   Result Value Ref Range    Glucose (POC) 74 65 - 100 mg/dL    Performed by Payal Costa (RN)        Radiologic Studies -   XR KNEE RT MAX 2 VWS   Final Result   IMPRESSION: Osteoarthritis with small joint effusion. .      XR CHEST PORT   Final Result   IMPRESSION: Suspect left lower lobe airspace disease. CT Results  (Last 48 hours)    None        CXR Results  (Last 48 hours)    None          Medical Decision Making   I am the first provider for this patient. I reviewed the vital signs, available nursing notes, past medical history, past surgical history, family history and social history. Vital Signs-Reviewed the patient's vital signs. Patient Vitals for the past 12 hrs:   Temp Pulse Resp BP SpO2   01/18/21 1313 -- -- -- -- 98 %   01/18/21 1311 98.8 °F (37.1 °C) 79 24 (!) 160/73 (!) 70 %       EKG interpretation: (Preliminary)  Rhythm: normal sinus rhythm; and regular . Rate (approx.): 68; Axis: normal; HI interval: normal; QRS interval: normal ; ST/T wave: non-specific changes; Other findings: .    Records Reviewed: Nursing Notes, Old Medical Records, Previous electrocardiograms, Previous Radiology Studies and Previous Laboratory Studies    Provider Notes (Medical Decision Making):   respiratory failure, COPD exacerbation, pneumonia, pulmonary embolism, DVT, arthritis, COVID-19    ED Course:   Initial assessment performed.  The patients presenting problems have been discussed, and they are in agreement with the care plan formulated and outlined with them. I have encouraged them to ask questions as they arise throughout their visit. Consult note: The patient is being admitted by Dr. Moncho Velez, hospitalist           Critical Care Time:   CRITICAL CARE NOTE :    1:59 PM    IMPENDING DETERIORATION -Respiratory, Cardiovascular, Metabolic and Renal  ASSOCIATED RISK FACTORS - Hypoxia, Metabolic changes and Dehydration  MANAGEMENT- Bedside Assessment and Supervision of Care  INTERPRETATION -  Xrays, ECG and Blood Pressure  INTERVENTIONS - hemodynamic mngmt and Metobolic interventions  CASE REVIEW - Hospitalist and Nursing  TREATMENT RESPONSE -Unchanged   PERFORMED BY - Self    NOTES   :  I have spent 45 minutes of critical care time involved in lab review, consultations with specialist, family decision- making, bedside attention and documentation. This time excludes time spent in any separate billed procedures. During this entire length of time I was immediately available to the patient . Tima Montenegro MD      Disposition:  admit    DISCHARGE PLAN:  1. Current Discharge Medication List        2. Follow-up Information    None       3. Return to ED if worse     Diagnosis     Clinical Impression:   1. Acute respiratory failure with hypoxia ([address]    2. Community acquired pneumonia of left lower lobe of lung    3. Arthritis of right knee    4. Uncontrolled type 2 diabetes mellitus with hyperglycemia (HCC)        Attestations:    Tima Montenegro MD    Please note that this dictation was completed with [employer], the computer voice recognition software. Quite often unanticipated grammatical, syntax, homophones, and other interpretive errors are inadvertently transcribed by the computer software. Please disregard these errors. Please excuse any errors that have escaped final proofreading. Thank you.

## 2021-01-19 LAB
ANION GAP SERPL CALC-SCNC: 5 MMOL/L (ref 5–15)
ATRIAL RATE: 68 BPM
BASOPHILS # BLD: 0 K/UL (ref 0–0.1)
BASOPHILS NFR BLD: 0 % (ref 0–1)
BUN SERPL-MCNC: 34 MG/DL (ref 6–20)
BUN/CREAT SERPL: 19 (ref 12–20)
CALCIUM SERPL-MCNC: 8 MG/DL (ref 8.5–10.1)
CALCULATED P AXIS, ECG09: 31 DEGREES
CALCULATED R AXIS, ECG10: -21 DEGREES
CALCULATED T AXIS, ECG11: 5 DEGREES
CHLORIDE SERPL-SCNC: 93 MMOL/L (ref 97–108)
CO2 SERPL-SCNC: 30 MMOL/L (ref 21–32)
CREAT SERPL-MCNC: 1.83 MG/DL (ref 0.55–1.02)
DIAGNOSIS, 93000: NORMAL
DIFFERENTIAL METHOD BLD: ABNORMAL
EOSINOPHIL # BLD: 0 K/UL (ref 0–0.4)
EOSINOPHIL NFR BLD: 0 % (ref 0–7)
ERYTHROCYTE [DISTWIDTH] IN BLOOD BY AUTOMATED COUNT: 16.5 % (ref 11.5–14.5)
GLUCOSE BLD STRIP.AUTO-MCNC: 300 MG/DL (ref 65–100)
GLUCOSE BLD STRIP.AUTO-MCNC: 366 MG/DL (ref 65–100)
GLUCOSE BLD STRIP.AUTO-MCNC: 386 MG/DL (ref 65–100)
GLUCOSE BLD STRIP.AUTO-MCNC: 391 MG/DL (ref 65–100)
GLUCOSE BLD STRIP.AUTO-MCNC: 438 MG/DL (ref 65–100)
GLUCOSE BLD STRIP.AUTO-MCNC: 439 MG/DL (ref 65–100)
GLUCOSE SERPL-MCNC: 446 MG/DL (ref 65–100)
HCT VFR BLD AUTO: 34.3 % (ref 35–47)
HEALTH STATUS, XMCV2T: ABNORMAL
HGB BLD-MCNC: 10.8 G/DL (ref 11.5–16)
IMM GRANULOCYTES # BLD AUTO: 0 K/UL (ref 0–0.04)
IMM GRANULOCYTES NFR BLD AUTO: 0 % (ref 0–0.5)
LYMPHOCYTES # BLD: 0.6 K/UL (ref 0.8–3.5)
LYMPHOCYTES NFR BLD: 15 % (ref 12–49)
MAGNESIUM SERPL-MCNC: 2 MG/DL (ref 1.6–2.4)
MCH RBC QN AUTO: 27.8 PG (ref 26–34)
MCHC RBC AUTO-ENTMCNC: 31.5 G/DL (ref 30–36.5)
MCV RBC AUTO: 88.4 FL (ref 80–99)
MONOCYTES # BLD: 0.1 K/UL (ref 0–1)
MONOCYTES NFR BLD: 2 % (ref 5–13)
NEUTS SEG # BLD: 3.5 K/UL (ref 1.8–8)
NEUTS SEG NFR BLD: 83 % (ref 32–75)
NRBC # BLD: 0 K/UL (ref 0–0.01)
NRBC BLD-RTO: 0 PER 100 WBC
P-R INTERVAL, ECG05: 166 MS
PLATELET # BLD AUTO: 226 K/UL (ref 150–400)
PMV BLD AUTO: 10.1 FL (ref 8.9–12.9)
POTASSIUM SERPL-SCNC: 5.8 MMOL/L (ref 3.5–5.1)
PROCALCITONIN SERPL-MCNC: <0.05 NG/ML
Q-T INTERVAL, ECG07: 376 MS
QRS DURATION, ECG06: 74 MS
QTC CALCULATION (BEZET), ECG08: 399 MS
RBC # BLD AUTO: 3.88 M/UL (ref 3.8–5.2)
RBC MORPH BLD: ABNORMAL
SARS-COV-2, COV2: DETECTED
SERVICE CMNT-IMP: ABNORMAL
SODIUM SERPL-SCNC: 128 MMOL/L (ref 136–145)
SOURCE, COVRS: ABNORMAL
SPECIMEN SOURCE, FCOV2M: ABNORMAL
SPECIMEN TYPE, XMCV1T: ABNORMAL
VENTRICULAR RATE, ECG03: 68 BPM
WBC # BLD AUTO: 4.2 K/UL (ref 3.6–11)

## 2021-01-19 PROCEDURE — 84145 PROCALCITONIN (PCT): CPT

## 2021-01-19 PROCEDURE — 74011250636 HC RX REV CODE- 250/636: Performed by: STUDENT IN AN ORGANIZED HEALTH CARE EDUCATION/TRAINING PROGRAM

## 2021-01-19 PROCEDURE — 74011000250 HC RX REV CODE- 250: Performed by: INTERNAL MEDICINE

## 2021-01-19 PROCEDURE — 74011250637 HC RX REV CODE- 250/637: Performed by: STUDENT IN AN ORGANIZED HEALTH CARE EDUCATION/TRAINING PROGRAM

## 2021-01-19 PROCEDURE — 36415 COLL VENOUS BLD VENIPUNCTURE: CPT

## 2021-01-19 PROCEDURE — 94640 AIRWAY INHALATION TREATMENT: CPT

## 2021-01-19 PROCEDURE — 80048 BASIC METABOLIC PNL TOTAL CA: CPT

## 2021-01-19 PROCEDURE — 74011250636 HC RX REV CODE- 250/636: Performed by: INTERNAL MEDICINE

## 2021-01-19 PROCEDURE — 74011000258 HC RX REV CODE- 258: Performed by: INTERNAL MEDICINE

## 2021-01-19 PROCEDURE — 83735 ASSAY OF MAGNESIUM: CPT

## 2021-01-19 PROCEDURE — 82962 GLUCOSE BLOOD TEST: CPT

## 2021-01-19 PROCEDURE — 74011636637 HC RX REV CODE- 636/637: Performed by: STUDENT IN AN ORGANIZED HEALTH CARE EDUCATION/TRAINING PROGRAM

## 2021-01-19 PROCEDURE — 74011636637 HC RX REV CODE- 636/637: Performed by: INTERNAL MEDICINE

## 2021-01-19 PROCEDURE — 85025 COMPLETE CBC W/AUTO DIFF WBC: CPT

## 2021-01-19 PROCEDURE — 99233 SBSQ HOSP IP/OBS HIGH 50: CPT | Performed by: CLINICAL NURSE SPECIALIST

## 2021-01-19 PROCEDURE — 99356 PR PROLONGED SVC I/P OR OBS SETTING 1ST HOUR: CPT | Performed by: CLINICAL NURSE SPECIALIST

## 2021-01-19 PROCEDURE — 77010033678 HC OXYGEN DAILY

## 2021-01-19 PROCEDURE — 74011250637 HC RX REV CODE- 250/637: Performed by: INTERNAL MEDICINE

## 2021-01-19 PROCEDURE — 65660000001 HC RM ICU INTERMED STEPDOWN

## 2021-01-19 RX ORDER — DEXAMETHASONE 4 MG/1
6 TABLET ORAL DAILY
Status: DISCONTINUED | OUTPATIENT
Start: 2021-01-20 | End: 2021-01-22 | Stop reason: HOSPADM

## 2021-01-19 RX ORDER — HYDROCODONE BITARTRATE AND ACETAMINOPHEN 7.5; 325 MG/1; MG/1
1 TABLET ORAL
Status: DISCONTINUED | OUTPATIENT
Start: 2021-01-19 | End: 2021-01-22 | Stop reason: HOSPADM

## 2021-01-19 RX ORDER — BUSPIRONE HYDROCHLORIDE 10 MG/1
20 TABLET ORAL 2 TIMES DAILY
COMMUNITY

## 2021-01-19 RX ORDER — GABAPENTIN 300 MG/1
900 CAPSULE ORAL 3 TIMES DAILY
COMMUNITY
End: 2021-02-13

## 2021-01-19 RX ORDER — UMECLIDINIUM BROMIDE AND VILANTEROL TRIFENATATE 62.5; 25 UG/1; UG/1
1 POWDER RESPIRATORY (INHALATION) DAILY
COMMUNITY
End: 2021-04-20

## 2021-01-19 RX ORDER — INSULIN GLARGINE 100 [IU]/ML
45 INJECTION, SOLUTION SUBCUTANEOUS DAILY
Status: DISCONTINUED | OUTPATIENT
Start: 2021-01-20 | End: 2021-01-19

## 2021-01-19 RX ORDER — BUMETANIDE 0.25 MG/ML
1 INJECTION INTRAMUSCULAR; INTRAVENOUS 2 TIMES DAILY
Status: DISCONTINUED | OUTPATIENT
Start: 2021-01-19 | End: 2021-01-22

## 2021-01-19 RX ORDER — HYDROCODONE BITARTRATE AND ACETAMINOPHEN 7.5; 325 MG/1; MG/1
TABLET ORAL
COMMUNITY
End: 2021-04-20

## 2021-01-19 RX ORDER — ARIPIPRAZOLE 30 MG/1
30 TABLET ORAL DAILY
COMMUNITY
End: 2022-09-13

## 2021-01-19 RX ORDER — FENTANYL 100 UG/H
1 PATCH TRANSDERMAL
Status: DISCONTINUED | OUTPATIENT
Start: 2021-01-19 | End: 2021-01-22 | Stop reason: HOSPADM

## 2021-01-19 RX ORDER — FENTANYL 25 UG/1
1 PATCH TRANSDERMAL
Status: DISCONTINUED | OUTPATIENT
Start: 2021-01-19 | End: 2021-01-22 | Stop reason: HOSPADM

## 2021-01-19 RX ORDER — FENTANYL 25 UG/1
1 PATCH TRANSDERMAL
COMMUNITY
End: 2022-03-10

## 2021-01-19 RX ADMIN — Medication 10 ML: at 13:54

## 2021-01-19 RX ADMIN — Medication 10 ML: at 21:55

## 2021-01-19 RX ADMIN — PREDNISONE 40 MG: 20 TABLET ORAL at 10:29

## 2021-01-19 RX ADMIN — AZITHROMYCIN MONOHYDRATE 500 MG: 500 INJECTION, POWDER, LYOPHILIZED, FOR SOLUTION INTRAVENOUS at 10:25

## 2021-01-19 RX ADMIN — GABAPENTIN 300 MG: 300 CAPSULE ORAL at 18:28

## 2021-01-19 RX ADMIN — ALBUTEROL SULFATE 2 PUFF: 90 AEROSOL, METERED RESPIRATORY (INHALATION) at 20:11

## 2021-01-19 RX ADMIN — INSULIN LISPRO 17.8 UNITS: 100 INJECTION, SOLUTION INTRAVENOUS; SUBCUTANEOUS at 07:30

## 2021-01-19 RX ADMIN — HYDROCODONE BITARTRATE AND ACETAMINOPHEN 1 TABLET: 7.5; 325 TABLET ORAL at 21:54

## 2021-01-19 RX ADMIN — ALBUTEROL SULFATE 2 PUFF: 90 AEROSOL, METERED RESPIRATORY (INHALATION) at 16:29

## 2021-01-19 RX ADMIN — CITALOPRAM HYDROBROMIDE 40 MG: 20 TABLET ORAL at 10:30

## 2021-01-19 RX ADMIN — BUMETANIDE 1 MG: 0.25 INJECTION, SOLUTION INTRAMUSCULAR; INTRAVENOUS at 18:28

## 2021-01-19 RX ADMIN — DICLOFENAC SODIUM 4 G: 10 GEL TOPICAL at 18:36

## 2021-01-19 RX ADMIN — CARVEDILOL 6.25 MG: 6.25 TABLET, FILM COATED ORAL at 10:29

## 2021-01-19 RX ADMIN — CEFTRIAXONE 1 G: 1 INJECTION, POWDER, FOR SOLUTION INTRAMUSCULAR; INTRAVENOUS at 12:07

## 2021-01-19 RX ADMIN — HUMAN INSULIN 25 UNITS: 100 INJECTION, SUSPENSION SUBCUTANEOUS at 18:28

## 2021-01-19 RX ADMIN — TRAZODONE HYDROCHLORIDE 100 MG: 100 TABLET ORAL at 21:54

## 2021-01-19 RX ADMIN — DICLOFENAC SODIUM 4 G: 10 GEL TOPICAL at 21:56

## 2021-01-19 RX ADMIN — BUMETANIDE 1 MG: 1 TABLET ORAL at 10:30

## 2021-01-19 RX ADMIN — ENOXAPARIN SODIUM 40 MG: 40 INJECTION SUBCUTANEOUS at 10:30

## 2021-01-19 RX ADMIN — PANTOPRAZOLE SODIUM 40 MG: 40 TABLET, DELAYED RELEASE ORAL at 10:29

## 2021-01-19 RX ADMIN — CLOPIDOGREL BISULFATE 75 MG: 75 TABLET ORAL at 10:29

## 2021-01-19 RX ADMIN — ALBUTEROL SULFATE 2 PUFF: 90 AEROSOL, METERED RESPIRATORY (INHALATION) at 00:40

## 2021-01-19 RX ADMIN — BUMETANIDE 1 MG: 0.25 INJECTION, SOLUTION INTRAMUSCULAR; INTRAVENOUS at 13:54

## 2021-01-19 RX ADMIN — ALBUTEROL SULFATE 2 PUFF: 90 AEROSOL, METERED RESPIRATORY (INHALATION) at 04:06

## 2021-01-19 RX ADMIN — ENOXAPARIN SODIUM 40 MG: 40 INJECTION SUBCUTANEOUS at 21:54

## 2021-01-19 RX ADMIN — ALBUTEROL SULFATE 2 PUFF: 90 AEROSOL, METERED RESPIRATORY (INHALATION) at 23:26

## 2021-01-19 RX ADMIN — GABAPENTIN 300 MG: 300 CAPSULE ORAL at 10:29

## 2021-01-19 RX ADMIN — Medication 10 ML: at 04:08

## 2021-01-19 NOTE — PROGRESS NOTES
Bedside and Verbal shift change report given to Thi Alvares RN (oncoming nurse) by Clifton Lovell RN (offgoing nurse). Report included the following information SBAR, Kardex, MAR, Accordion and Recent Results.

## 2021-01-19 NOTE — PROGRESS NOTES
Transition of Care Plan:  - Home   - Boyfriend to provide transportation home at d/c  - Outpatient follow up: PCP   - 2nd IMM Letter    Reason for Admission: acute on chronic hypoxic respiratory failure, COPD exacerbation, community-acquired pneumonia, diastolic CHF with varicose veins s/p ligation and stripping, etc                  RUR Score: 20% moderate risk for readmission                  PCP: First and Last name: Mateo Melo MD     Name of Practice:    Are you a current patient: Yes/No: Yes   Approximate date of last visit: Yesterday 1/18 per pt   Can you participate in a virtual visit if needed: Yes    Do you (patient/family) have any concerns for transition/discharge? No concerns voiced at this time, pt voices being eager to return home                   Plan for utilizing home health: No home health needs presently identified. Current Advanced Directive/Advance Care Plan: Advance Care Planning     General Advance Care Planning (ACP) Conversation      Date of Conversation: 1/19/2021  Conducted with: Patient with Decision Making Capacity    Healthcare Decision Maker:     Content/Action Overview:   DECLINED ACP conversation - will revisit periodically   Reviewed DNR/DNI and patient elects Full Code (Attempt Resuscitation)    Length of Voluntary ACP Conversation in minutes:  <16 minutes (Non-Billable)    Myla Strange                        Transition of Care Plan: Home with outpatient follow up    CM completed initial assessment with pt via phone due to COVID-19 rule out precautions. Pt states that she lives with her boyfriend in a 1 level home with 2 [facility]. States that she has home oxygen through [place] (baseline is 3L) that she uses for ambulating longer than 5 minutes. Pt's boyfriend to transport her home at time of discharge. Pt states that her friend, Pancho Porter, transports her to appointments. Pt uses [facility] on [address], no issues with obtaining medications voiced.  Pt denies SNF/IRF/HH hx. Pt is also a pt of Dr. Gayr, pulmonologist.    Care management will continue to follow for transition of care planning needs. Care Management Interventions  PCP Verified by CM: Yes  Palliative Care Criteria Met (RRAT>21 & CHF Dx)?: No  Mode of Transport at Discharge: Other (see comment)(Jami, Don)  Transition of Care Consult (CM Consult): Discharge Planning  Discharge Durable Medical Equipment: No(Home oxygen through [employer])  Physical Therapy Consult: Yes  Occupational Therapy Consult: No  Speech Therapy Consult: No  Current Support Network:  Other(Lives with jami, Cara Crain)  Confirm Follow Up Transport: Friends(Friend, Cristian Maciel)  Discharge Location  Discharge Placement: Home with outpatient services(Awaiting PT consult for further recommendations)    [address] [facility] [address] [facility]

## 2021-01-19 NOTE — PROGRESS NOTES
12 - report received from [facility], [facility].   0730 - patient's blood glucose 438. Patient is on her own insulin pump and insisting on using pump. Patient administered her own insulin. Will inform MD.   1083 - perfect served message regarding patient's am lab.   4137 - informed MD. DICKINSON ok with using insulin pump and will consult diabetes management. 1504 - report called to Stefanie Messina RN.   0767 - perfect serve messaged Dr. Mireille Deluca regarding am labs, again. And inquired about fentanyl patch. 4303 - Dr. Elizabeth Fuller aware of labs. Will add order for fentanyl patch.

## 2021-01-19 NOTE — PROGRESS NOTES
1345: TRANSFER - IN REPORT:    Verbal report received from ALVARO OBRIEN(name) on Vy Lower  being received from ED(unit) for routine progression of care      Report consisted of patients Situation, Background, Assessment and   Recommendations(SBAR). Information from the following report(s) SBAR was reviewed with the receiving nurse. Opportunity for questions and clarification was provided. Assessment completed upon patients arrival to unit and care assumed.

## 2021-01-19 NOTE — PROGRESS NOTES
Enoxaparin dose adjusted to 40mg sc q12h per [address] dosing protocol.     BMI > 30  Est Crcl > 30  D dimer < 6.5  (Group C dosing)    DEMAR Jeff [facility]

## 2021-01-19 NOTE — PROGRESS NOTES
Hospitalist Progress Note    NAME: Marta Hastings   :  1970   MRN:  [id number]       Assessment / Plan:  Acute on chronic hypoxic respiratory failure  Due to diastolic congestive heart failure POA  OPD ruled out  Sleep community-acquired pneumonia  Right lower extremity pain and swelling  Hypervolemic hyponatremia  -WBC 5.2, procalcitonin pending.  -Initially patient was on 6 L nasal cannula with saturation around 92-93, slowly improved, currently on room air. Patient use home oxygen as needed specially on ambulation.  -Chest x-ray shows lower lobe airspace disease  -Continue Rocephin and azithromycin and discontinue if procalcitonin level is normal.  -Rapid Covid negative, PCR pending.  -Albuterol every 6 hours.  -Stop prednisone as wheezing is resolved and patient symptoms are likely due to CHF  -Transition Bumex to 1 mg IV twice daily  -I's and O's  -Daily weight  -Recent echocardiogram in August showed EF of 55 to 60%   -Right lower extremity Doppler did not show any evidence of deep vein thrombosis  -Swelling is likely due to edema and fluid retention  -Renal function while being diuresed  -Edema level is 128, patient is hypervolemic  -Diuresis as above, monitor BMP     hypertension  Anxiety disorder  Diabetic neuropathy  CAD  Insomnia  -Continue the home medications.     Diabetes type 2 hyperglycemia  Sliding scale insulin plus Lantus 32 units.   HbA1c 1 week ago was 9.6.     Arthritis of the knees on narcotic treatment  -Continue fentanyl patch, diclofenac gel for the knee.     Tobacco abuse  Smokes 1 and half pack a day, on nicotine patch.     PT consulted for assessing the oxygen requirement at home.     Code Status: Full code  Surrogate Decision Maker:      DVT Prophylaxis: Lovenox  GI Prophylaxis: not indicated     Baseline: Ambulatory with assistance at home       Subjective:     Chief Complaint / Reason for Physician Visit  \" Reports improvement in shortness of breath, continues to report right knee pain. Denies any fever or chills. Currently on room air\". Discussed with RN events overnight. Review of Systems:  Symptom Y/N Comments  Symptom Y/N Comments   Fever/Chills n   Chest Pain n    Poor Appetite n   Edema y    Cough n   Abdominal Pain n    Sputum n   Joint Pain     SOB/HERNÁNDEZ n   Pruritis/Rash     Nausea/vomit n   Tolerating PT/OT     Diarrhea n   Tolerating Diet     Constipation n   Other       Could NOT obtain due to:      Objective:     VITALS:   Last 24hrs VS reviewed since prior progress note. Most recent are:  Patient Vitals for the past 24 hrs:   Temp Pulse Resp BP SpO2   01/19/21 1300 -- 62 15 139/65 96 %   01/19/21 1105 -- 65 16 -- 90 %   01/19/21 1100 98.9 °F (37.2 °C) 68 13 100/60 (!) 89 %   01/19/21 1000 -- 71 15 (!) 111/54 96 %   01/19/21 0900 -- 67 15 125/66 94 %   01/19/21 0800 -- 75 18 (!) 145/53 94 %   01/19/21 0700 98.7 °F (37.1 °C) 67 12 (!) 127/58 (!) 89 %   01/19/21 0300 98.8 °F (37.1 °C) 70 14 (!) 107/53 91 %   01/18/21 2300 98.2 °F (36.8 °C) 64 12 107/61 90 %   01/18/21 2153 -- 70 -- (!) 102/56 --   01/18/21 2035 -- -- -- -- 96 %   01/18/21 1930 -- 74 13 104/64 91 %   01/18/21 1645 -- 74 14 (!) 106/58 90 %   01/18/21 1643 -- 77 18 (!) 99/54 92 %     No intake or output data in the 24 hours ending 01/19/21 1525     PHYSICAL EXAM:  General: WD, WN. Alert, cooperative, no acute distress    EENT:  EOMI. Anicteric sclerae. MMM  Resp:  CTA bilaterally, no wheezing or rales. No accessory muscle use  CV:  Regular  rhythm, 3+ edema bilateral lower extremities  GI:  Soft, Non distended, Non tender.  +Bowel sounds  Neurologic:  Alert and oriented X 3, normal speech,   Psych:   Good insight. Not anxious nor agitated  Skin:  No rashes.   No jaundice    Reviewed most current lab test results and cultures  YES  Reviewed most current radiology test results   YES  Review and summation of old records today    NO  Reviewed patient's current orders and MAR    YES  PMH/SH reviewed - no change compared to H&P  ________________________________________________________________________  Care Plan discussed with:    Comments   Patient x    Family      RN x    Care Manager     Consultant                        Multidiciplinary team rounds were held today with , nursing, pharmacist and clinical coordinator. Patient's plan of care was discussed; medications were reviewed and discharge planning was addressed. ________________________________________________________________________  Total NON critical care TIME:  35   Minutes    Total CRITICAL CARE TIME Spent:   Minutes non procedure based      Comments   >50% of visit spent in counseling and coordination of care     ________________________________________________________________________  Aviva Carbajal MD     Procedures: see electronic medical records for all procedures/Xrays and details which were not copied into this note but were reviewed prior to creation of Plan. LABS:  I reviewed today's most current labs and imaging studies.   Pertinent labs include:  Recent Labs     01/19/21  0325 01/18/21  1425   WBC 4.2 5.2   HGB 10.8* 10.4*   HCT 34.3* 32.5*    237     Recent Labs     01/19/21  0325 01/18/21  1425   * 132*   K 5.8* 4.2   CL 93* 96*   CO2 30 31   * 235*   BUN 34* 30*   CREA 1.83* 1.91*   CA 8.0* 8.1*   MG 2.0  --    ALB  --  3.6   TBILI  --  0.2   ALT  --  20       Signed: Aviva Carbajal MD

## 2021-01-19 NOTE — PROGRESS NOTES
Endocrinology Progress Note    Received a note from TONY Avila, that my clinic patient has been admitted and was diagnosed with COVID. She is mentally stable enough to continue using her insulin pump. She received a dose of 40 mg of prednisone this morning and her sugars have been in the high 300s today. Hyde Elliot wrote to give a now dose of 15 units of NPH at [facility] but I spoke with pt's nurse, Iqra Garcia, and she was not aware of this recommendation and I don't see that anyone else has ordered this dose. Given her weight of 107 kg and usually patients need about 0.4 units/kg of NPH for 40 mg of prednisone, I think she would benefit from 25 units of NPH now instead of 15 units and have placed this order. I agree that she can use her pump now. She will be started on dexamethasone 6 mg daily starting tomorrow, which is the equivalent of 30 mg of prednisone so I will give her a dose of NPH 30 units every morning to cover her steroids. I told Iqra Garcia to let patient know that I tried to call into her room and her phone was busy and her cell phone went straight to voicemail but I will be following along while she is admitted. Please don't hesitate to send me a message through perfect serve with any questions or concerns.     Mike Barr MD

## 2021-01-19 NOTE — PROGRESS NOTES
1600: Pt requesting Norco order, per Dr. Mele An- if order can be confirmed by pharmacy then the order can be placed. [facility] called- [place] order confirmed 7.5/325.    2444: Pt states she uses 125 mcg fentanyl patch at home. Per Dr. Mele An if able to confirm with pharmacy- ok to order.  [address] [place] [address] called and confirmed pt uses 125 mcg fentanyl patch q72 hr.

## 2021-01-19 NOTE — PROGRESS NOTES
Received page from Etowah Fleischer at 18:53 that stated [facility] I'm sorry I'm trying to get a message to Dr. Shay Allen. He sees this pt outpt and she said she needs a refill of Humalog for her pump. She was also wondering if he can prescribe her Xanax while she is in the hospital. The pt is trying to call him on her cell phone but I just wanted to reach out as well. Thank you. \"    To which I replied \"I'll put a humalog refill in but her PCP needs to rx xanax. Please do not call Dr. Shay Allen on his cell phone. Ask her to call the office re xanax which is not a med an Endocrinologist prescribes. \"    Then I checked the chart and saw this       Pt should have plenty of novolog refills left. Alerted Mark Anthony Latif to this fact and that the pt should call [facility] on [address] for a refill. Of note, she is currently admitted to the hospital for [facility].     Pam Quinn, [place] [address] [facility] Endocrinology

## 2021-01-19 NOTE — PROGRESS NOTES
Pharmacy Clarification of the Prior to Admission Medication Regimen Retrospective to the Admission Medication Reconciliation    The patient was interviewed by phone regarding clarification of the prior to admission medication regimen. Patient was questioned regarding use of any other inhalers, topical products, over the counter medications, herbal medications, vitamin products or ophthalmic/nasal/otic medication use. Information Obtained From: query, patient    Recommendations/Findings: The following amendments were made to the patient's active medication list on file at [facility]:     1) Additions:   Abilify  Rexulti  Buspirone  Anoro ellipta  Hydrocodone/acet      2) Removals:   Supplies      3) Changes:  Gabapentin 300 mg (Old regimen: 2 cap BID /New regimen: 3 caps TID)      4) Pertinent Pharmacy Findings:  Updated patients preferred outpatient pharmacy to: [facility]  Identified High Alert Medication Information         PTA medication list was corrected to the following:     Prior to Admission Medications   Prescriptions Last Dose Informant Taking? ALPRAZolam (XANAX) 1 mg tablet 1/17/2021 at Unknown time Self Yes   Sig: Take 1 mg by mouth three (3) times daily as needed. ARIPiprazole (Abilify) 30 mg tablet 1/17/2021 at Unknown time Self Yes   Sig: Take 30 mg by mouth daily. HYDROcodone-acetaminophen (NORCO) 7.5-325 mg per tablet 1/12/2021 at Unknown time Self Yes   Sig: Take  by mouth every six (6) hours as needed for Pain. 1-2 tabs every 6 hours as needed for pain   atorvastatin (LIPITOR) 40 mg tablet 1/17/2021 at Unknown time Self Yes   Sig: TAKE ONE TABLET BY MOUTH DAILY   brexpiprazole (Rexulti) 0.5 mg tab tablet 1/17/2021 at Unknown time Self Yes   Sig: Take 1 mg by mouth daily. bumetanide (BUMEX) 1 mg tablet 1/17/2021 at Unknown time Self Yes   Sig: Take 1 Tab by mouth two (2) times a day.    busPIRone (BUSPAR) 10 mg tablet 1/17/2021 at Unknown time Self Yes   Sig: Take 20 mg by mouth two (2) times a day. carvedilol (COREG) 6.25 mg tablet 2021 at Unknown time Self Yes   Sig: Take 1 Tab by mouth daily. Delete the 12.5 mg dose from profile   citalopram (CELEXA) 20 mg tablet 2021 at Unknown time Self Yes   Sig: Take 40 mg by mouth daily. clopidogreL (Plavix) 75 mg tab 2021 at Unknown time Self Yes   Sig: Take 1 Tab by mouth daily. esomeprazole (NEXIUM) 40 mg capsule 2021 at Unknown time Self Yes   Sig: TAKE ONE CAPSULE BY MOUTH DAILY   fentaNYL (DURAGESIC) 100 mcg/hr PATCH 2021 at Unknown time Self Yes   Si Patch by TransDERmal route every seventy-two (72) hours. fluticasone propionate (FLONASE) 50 mcg/actuation nasal spray 2021 at Unknown time Self Yes   Si Sprays by Both Nostrils route daily as needed for Rhinitis.   gabapentin (NEURONTIN) 300 mg capsule 2021 at Unknown time Self Yes   Sig: Take 900 mg by mouth three (3) times daily. glucagon (Glucagon Emergency Kit, human,) 1 mg injection 2020 at Unknown time Self Yes   Sig: USE AS DIRECTED   insulin aspart U-100 (NovoLOG U-100 Insulin aspart) 100 unit/mL injection 2021 at Unknown time Self Yes   Sig: USE AS DIRECTED FOR INSULIN PUMP.  UNITS PER DAY. DX E11.65   Patient taking differently: No sig reported   insulin pump (PATIENT SUPPLIED) misc 2021 at Unknown time Self Yes   Sig: by SubCUTAneous route as needed. Insulin Type: Novolog  Basal Dose:  midnight-midnight: 3.4 units/hr    Target B- mg/dL  Corrective Dose: 1 unit per 50 mg/dL over 150 mg/dL  Insulin: Carb ratio: 1 units: 15 grams  Last time insulin pump supplies changed: 21  Last time insulin pump refilled: 21   levonorgestrel (MIRENA) 20 mcg/24 hr (5 years) IUD 2021 at Unknown time Self Yes   Si Device by IntraUTERine route once.    promethazine (PHENERGAN) 25 mg tablet 2020 at Unknown time Self Yes   Sig: TAKE ONE TABLET BY MOUTH EVERY 6 HOURS AS NEEDED FOR NAUSEA   traZODone (DESYREL) 50 mg tablet 1/17/2021 at Unknown time Self Yes   Sig: Take 100 mg by mouth nightly. umeclidinium-vilanteroL (Anoro Ellipta) 62.5-25 mcg/actuation inhaler 12/19/2020 at Unknown time Self Yes   Sig: Take 1 Puff by inhalation daily.       Facility-Administered Medications: None        Thank you,  Alessandro Forman CPHT  Medication History Pharmacy Technician

## 2021-01-19 NOTE — DIABETES MGMT
[facility]    CLINICAL NURSE SPECIALIST CONSULT   INITIAL NOTE    Presentation   Rubia Blanco is a 53 y.o. female admitted 1/18/21 from the ER with dyspnea. Afebrile, Normotensive. . AG 5. GFR 28. Normal liver parameters. WBC 5.2. Troponin Negative. D-Dimer 0.39. Lactic acid 0.86. NT pro-. CXR: Lower lobe airspace disease  LE Duplex: Negative for DVT  Knee Xray: Osteoarthritis with small joint effusion. HX:   Past Medical History:   Diagnosis Date    Achilles tendon rupture     along with torn right patellar/femoral ligament    Arthritis     rt. foot    Chronic kidney disease     Chronic pain     abdominal pain    Diabetes (HCC)     IDDM on insulin pump and sensor    DM type 1 (diabetes mellitus, type 1) ([facility] [address]     age 25    Endometriosis     s/p ex-lap 4x    Gastric ulcer     GERD (gastroesophageal reflux disease)     Herpes     Other and unspecified hyperlipidemia     Panic attacks     Psychiatric disorder     anxiety, panic attacks    PTSD (post-traumatic stress disorder)     Unspecified essential hypertension      DX: ARF. COVID-19 +. CAP. RLE pain. TX: ABx. 02NC/Room air. Diuresis. Anti-COVID strategies: Azithromycin. Steroids. Current clinical course has been uncomplicated. Diabetes: Patient has known Type 2 diabetes X28 years, treated with insulin pump therapy PTA. Family history positive for diabetes in mother & father. Admission  and A1c 9.6% indicate poor diabetes control.  Consulted by Provider for advanced diabetes nursing assessment and care, specifically related to   [x] Inpatient management strategy via insulin pump    Medtronic 630G Insulin pump settings  Basal: 12a: 1.55, 4:30a: 1.65, 10p: 1.55  Carb ratio: 12  Sensitivity: 20  BG target: 12a: 130-150, 6a: 100-130, 10p: 130-150    Diabetes-related medical history  Microvascular disease  Nephropathy  Macrovascular disease  CAD    Subjective   I see Dr. Shay Allen for my diabetes. We just had a virtual visit.     Patient reports the following home diabetes self-care practices:  Eating pattern  [x] Lunch  May have tuna or chicken salad. May not eat. [x] Dinner  Grilled chicken with rice or potatoes  Physical activity pattern  [x] Non-Aerobic exercise   Monitoring pattern-Monitors using a [employer] Freestyle Qiana CGM system  [x] Breakfast 200s  [x] Lunch  200s  [x] Dinner  200s  [x] Bedtime 200s  Taking medications pattern  [x] Consistent administration  [x] Affordable     Objective   Physical exam  General Alert, oriented and in no acute distress. Conversant and cooperative. Sitting in lounger with legs elevated. Vital Signs Afebrile. Normotensive. Visit Vitals  BP (!) 144/73   Pulse 70   Temp 98.7 °F (37.1 °C)   Resp 16   Ht 5' 5\" (1.651 m)   Wt 107.9 kg (237 lb 14 oz)   SpO2 90%   BMI 39.58 kg/m²     Skin  Warm and dry. Extremities No foot wounds    Diabetic foot exam:    Left Foot     Visual Exam: callous - heels. Thick nails   Pulse DP: 2+ (normal)    Right Foot   Visual Exam: callous - heels Thick nails   Pulse DP: 2+ (normal)    Laboratory  Tests Today 1/18/21 1/6/21   A1c   9.6%    235    Anion gap 5 5    Serum triglycerides      WBC 4.2 5.2    Serum creatinine 1.83 1.91    GFR 29 28    AST  22    ALT  20    CK  178    Troponin  <0.05    NT pro-BNP  986    1/18/21 Blood culture NO growth    Factors affecting BG management  Factor Dose Comments   Nutrition:  Cardiac Regular meals    Nothing documented. Drugs:  Steroids     Prednisone 40mg D X2 days  (1/18-1/19/21)  Switched to Decadron 6mg D  (1/20/21)          Pain Scheduled Neurontin  Fentanyl patch  [place] prn Rated @8 today. Infection: COVID + Steroids  Azithromycin    Other: CKD  GFR 29.      Blood glucose pattern       Assessment and Plan   Nursing Diagnosis Risk for unstable blood glucose pattern   Nursing Intervention Domain 7189 Decision-making Support   Nursing Interventions Examined current inpatient diabetes control   Explored factors facilitating and impeding inpatient management  Identified self-management practices impeding diabetes control     Evaluation   This  female, with Type 2 diabetes, did not achieve diabetes control prior to admission (PTA), as evidenced by admission BG of 234 and A1c of 9.6%. This patient wears a Medtronic 630G insulin pump for insulin delivery. She also uses a CGM device to monitor her BGs. She is capable of continuing the use of her pump at this time. Should this patient become unable to deliver insulin, recommendations are listed below. Since BG is elevated at this time, may add NPH insulin 15 units now. Recommendations   Recommend:    NPH insulin 15 units NOW    Change diet to Carbohydrate Consistent meals    Patient may use Medtronic 630G insulin pump in delivering basal & bolus insulin    If insulin pump cannot be used:   Basal insulin   Lantus insulin 40 units D   Mealtime insulin  Based on CHO count (1:12 ratio)   Correction factor Insulin-resistant scale    Billing Code(s)   I personally reviewed medical record, including notes, data and current medications, and examined the patient at bedside before making care recommendations.      [x] [id number] IP subsequent hospital care - 35 minutes [x] [id number] Prolonged Services - 65 minutes [] [id number] Prolonged Services - 110 minutes  [] [id number] IP subsequent hospital care - 25 minutes [] [id number] Prolonged Services - 55 minutes [] [id number] Prolonged Services - 100 minutes  [] [id number] IP subsequent hospital care - 15 minutes [] [id number] Prolonged Services - 45 minutes [] [id number] Prolonged Services - 90 minutes    TONY Song  Diabetes Clinical Nurse Specialist  [facility]  Access via [address] [place] [address]

## 2021-01-19 NOTE — PROGRESS NOTES
Problem: Falls - Risk of  Goal: *Absence of Falls  Description: Document Noemi Forman Fall Risk and appropriate interventions in the flowsheet.   Outcome: Progressing Towards Goal  Note: Fall Risk Interventions:

## 2021-01-19 NOTE — H&P
Hospitalist Admission Note    NAME: Brent Moritz   :  1970   MRN:  [id number]     Date/Time:  2021 7:44 PM    Patient PCP: Ruth Gonzalez MD  ______________________________________________________________________  Given the patient's current clinical presentation, I have a high level of concern for decompensation if discharged from the emergency department. Complex decision making was performed, which includes reviewing the patient's available past medical records, laboratory results, and x-ray films. My assessment of this patient's clinical condition and my plan of care is as follows. Assessment / Plan:    Acute on chronic hypoxic respiratory failure  COPD exacerbation  Community-acquired pneumonia  -WBC 5.2, procalcitonin pending.  -Initially patient was on 6 L nasal cannula with saturation around 92-93, slowly improved, currently on 3 L nasal cannula. Patient use home oxygen as needed specially on ambulation.  -Chest x-ray shows lower lobe airspace disease  -Rocephin and azithromycin.  -Rapid Covid negative, PCR pending.  -Albuterol every 6 hours.  -Prednisone 40 mg daily for 5 days  -PT consulted, will assess oxygen requirement on ambulation tomorrow.  -Patient need refill of Symbicort on discharge. Diastolic CHF with varicose veins s/p ligation and stripping  -On Bumex twice daily  -Echo 2020 shows EF 55 to 60%. Hypertension  Anxiety disorder  Diabetic neuropathy  CAD  Insomnia  -Continue the home medications. Diabetes type 2 hyperglycemia  Sliding scale insulin plus Lantus 32 units. HbA1c 1 week ago was 9.6. Arthritis of the knees on narcotic treatment  -Continue fentanyl patch, diclofenac gel for the knee. Tobacco abuse  Smokes 1 and half pack a day, on nicotine patch. PT consulted for assessing the oxygen requirement at home.     Code Status: Full code  Surrogate Decision Maker:     DVT Prophylaxis: Lovenox  GI Prophylaxis: not indicated    Baseline: Ambulatory with assistance at home      Subjective:   CHIEF COMPLAINT: Shortness of breath, low oxygen saturation    HISTORY OF PRESENT ILLNESS:     Maikel Israel is a 53 y.o.  female who presents with loss of breath and low oxygen saturation for 1 day. Patient past medical history of hypertension, diabetes type 2, osteoarthritis of the knee, diastolic CHF, varicose veins, anxiety disorder, CAD, COPD. States that when today she went to see her primary care doctor for the knee pain her oxygen saturation was in low 70s and she was sent to the ER for evaluation. Patient did report of some shortness of breath but not worse. Patient is supposed to use oxygen at home-3 L but she seldom uses it specially only during ambulation. Patient also stopped taking Symbicort because according to her she does not need after the heart surgery that was done and August 2020. Patient is not compliant with the medications, still smokes 1 and half pack a day. Denies any chest pain, no fever and chills, no history of sick contacts, no nausea or vomiting, no abdominal pain, no dizziness, no other complaints. We were asked to admit for work up and evaluation of the above problems.      Past Medical History:   Diagnosis Date    Achilles tendon rupture     along with torn right patellar/femoral ligament    Arthritis     rt. foot    Chronic kidney disease     Chronic pain     abdominal pain    Diabetes (HCC)     IDDM on insulin pump and sensor    DM type 1 (diabetes mellitus, type 1) (HCC)     age 25    Endometriosis     s/p ex-lap 4x    Gastric ulcer     GERD (gastroesophageal reflux disease)     Herpes     Other and unspecified hyperlipidemia     Panic attacks     Psychiatric disorder     anxiety, panic attacks    PTSD (post-traumatic stress disorder)     Unspecified essential hypertension         Past Surgical History:   Procedure Laterality Date    HX COLONOSCOPY      HX GYN      2 d&c's    HX GYN      laparoscopies x5 for endometriosis    HX GYN      mirena in place    HX ORTHOPAEDIC  2002    achiles tendon repair,talus repair    HX ORTHOPAEDIC      injections x2 to right shoulder    HX ORTHOPAEDIC Left 02/07/2019    3rd trigger finger release       Social History     Tobacco Use    Smoking status: Current Every Day Smoker     Packs/day: 1.00     Years: 28.00     Pack years: 28.00     Types: Cigarettes    Smokeless tobacco: Current User   Substance Use Topics    Alcohol use: Not Currently     Comment: a beer every few months        Family History   Problem Relation Age of Onset    Diabetes Mother         diet controlled    Diabetes Father         R foot amupation    Liver Disease Father     Heart Disease Paternal Uncle         MI in his 46s    Stroke Neg Hx      Allergies   Allergen Reactions    Metolazone Other (comments)     Caused hyponatremia    Zocor [Simvastatin] Myalgia    Lisinopril Cough    Medrol [Methylprednisolone] Other (comments)     Caused severe hyperglycemia with the medrol pack        Prior to Admission medications    Medication Sig Start Date End Date Taking? Authorizing Provider   FreeStyle Qiana 14 Day Sensor kit Use as directed every 14 days--dispense 1 sensor to replace one that broke. PRINCE#[id number] 1/11/21  Yes West Cotter MD   Contour Next Test Strips strip USE AS DIRECTED TO TEST UP TO 8 TIMES PER DAY--DX E10.65 10/6/20  Yes West Cotter MD   ALPRAZolam Danney Arben) 1 mg tablet three (3) times daily as needed. 9/25/20  Yes Provider, Historical   insulin aspart U-100 (NovoLOG U-100 Insulin aspart) 100 unit/mL injection USE AS DIRECTED FOR INSULIN PUMP.  UNITS PER DAY.   DX E11.65 9/24/20  Yes West Cotter MD   promethazine (PHENERGAN) 25 mg tablet TAKE ONE TABLET BY MOUTH EVERY 6 HOURS AS NEEDED FOR NAUSEA 9/10/20  Yes Lino Gonsalez III, DO   fluticasone propionate (FLONASE) 50 mcg/actuation nasal spray 2 Sprays by Both Nostrils route daily as needed for Rhinitis. 9/10/20  Yes Lino Gonsalez III, DO   clopidogreL (Plavix) 75 mg tab Take 1 Tab by mouth daily. 8/15/20  Yes Kat Summers MD   Insulin Syringe-Needle U-100 (BD Insulin Syringe Ultra-Fine) 0.5 mL 31 gauge x 5/16\" syrg USE AS DIRECTED UP TO 4 TIMES DAILY 7/28/20  Yes Kat Summers MD   Insulin Needles, Disposable, (BD Ultra-Fine Mini Pen Needle) 31 gauge x 3/16\" ndle USE AS DIRECTED UP TO 8 TIMES DAILY 7/28/20  Yes Kat Summers MD   bumetanide (BUMEX) 1 mg tablet Take 1 Tab by mouth two (2) times a day. 7/26/20  Yes Kat Summers MD   gabapentin (NEURONTIN) 300 mg capsule TAKE TWO CAPSULES BY MOUTH THREE TIMES A DAY 7/18/20  Yes Kat Summers MD   glucagon (Glucagon Emergency Kit, human,) 1 mg injection USE AS DIRECTED 4/20/20  Yes Kat Summers MD   carvedilol (COREG) 6.25 mg tablet Take 1 Tab by mouth daily. Delete the 12.5 mg dose from profile 12/12/19  Yes Kat Summers MD   atorvastatin (LIPITOR) 40 mg tablet TAKE ONE TABLET BY MOUTH DAILY 11/13/19  Yes Lino Gonsalez III, DO   esomeprazole (NEXIUM) 40 mg capsule TAKE ONE CAPSULE BY MOUTH DAILY 10/21/19  Yes Lino Gonsalez III, DO   insulin pump (PATIENT SUPPLIED) misc by SubCUTAneous route as needed. Yes Provider, Historical   citalopram (CELEXA) 20 mg tablet Take 40 mg by mouth daily. 5/20/19  Yes Provider, Historical   traZODone (DESYREL) 50 mg tablet Take 100 mg by mouth nightly. 5/6/19  Yes Provider, Historical   levonorgestrel (MIRENA) 20 mcg/24 hr (5 years) IUD 1 Device by IntraUTERine route once. Yes Provider, Historical   fentaNYL (DURAGESIC) 100 mcg/hr PATCH 1 Patch by TransDERmal route every seventy-two (72) hours. 7/23/18  Yes Provider, Historical       REVIEW OF SYSTEMS:     I am not able to complete the review of systems because:    The patient is intubated and sedated    The patient has altered mental status due to his acute medical problems    The patient has baseline aphasia from prior stroke(s)    The patient has baseline dementia and is not reliable historian    The patient is in acute medical distress and unable to provide information           Total of 12 systems reviewed as follows:       POSITIVE= underlined text  Negative = text not underlined  General:  fever, chills, sweats, generalized weakness, weight loss/gain,      loss of appetite   Eyes:    blurred vision, eye pain, loss of vision, double vision  ENT:    rhinorrhea, pharyngitis   Respiratory:   cough, sputum production, SOB, HERNÁNDEZ, wheezing, pleuritic pain   Cardiology:   chest pain, palpitations, orthopnea, PND, edema, syncope   Gastrointestinal:  abdominal pain , N/V, diarrhea, dysphagia, constipation, bleeding   Genitourinary:  frequency, urgency, dysuria, hematuria, incontinence   Muskuloskeletal :  arthralgia, myalgia, back pain. Knee pain  Hematology:  easy bruising, nose or gum bleeding, lymphadenopathy   Dermatological: rash, ulceration, pruritis, color change / jaundice  Endocrine:   hot flashes or polydipsia   Neurological:  headache, dizziness, confusion, focal weakness, paresthesia,     Speech difficulties, memory loss, gait difficulty  Psychological: Feelings of anxiety, depression, agitation    Objective:   VITALS:    Visit Vitals  /64   Pulse 74   Temp 98.8 °F (37.1 °C)   Resp 13   Ht 5' 5\" (1.651 m)   Wt 107.9 kg (237 lb 14 oz)   SpO2 91%   BMI 39.58 kg/m²       PHYSICAL EXAM:    General:    Alert, cooperative, no distress, appears stated age. HEENT: Atraumatic, anicteric sclerae, pink conjunctivae     No oral ulcers, mucosa moist, throat clear, dentition fair  Neck:  Supple, symmetrical,  thyroid: non tender  Lungs:   Clear to auscultation bilaterally. No Wheezing or Rhonchi. No rales. Chest wall:  No tenderness  No Accessory muscle use. Heart:   Regular  rhythm,  No  murmur   No edema  Abdomen:   Soft, non-tender. Not distended.   Bowel sounds normal  Extremities: No cyanosis. No clubbing,      Skin turgor normal, Capillary refill normal, Radial dial pulse 2+  Skin:     Not pale. Not Jaundiced  No rashes   Psych:  Good insight. Not depressed. Not anxious or agitated. Neurologic: EOMs intact. No facial asymmetry. No aphasia or slurred speech. Symmetrical strength, Sensation grossly intact. Alert and oriented X 4.     _______________________________________________________________________  Care Plan discussed with:    Comments   Patient x    Family  x    RN     Care Manager                    Consultant:  x    _______________________________________________________________________  Expected  Disposition:   Home with Family    HH/PT/OT/RN x   SNF/LTC    [facility]I    ________________________________________________________________________  TOTAL TIME:  64 Minutes    Critical Care Provided     Minutes non procedure based      Comments     Reviewed previous records   >50% of visit spent in counseling and coordination of care  Discussion with patient and/or family and questions answered       ________________________________________________________________________  Signed: Julian Leggett MD    Procedures: see electronic medical records for all procedures/Xrays and details which were not copied into this note but were reviewed prior to creation of Plan.     LAB DATA REVIEWED:    Recent Results (from the past 24 hour(s))   EKG, 12 LEAD, INITIAL    Collection Time: 01/18/21  1:35 PM   Result Value Ref Range    Ventricular Rate 68 BPM    Atrial Rate 68 BPM    P-R Interval 166 ms    QRS Duration 74 ms    Q-T Interval 376 ms    QTC Calculation (Bezet) 399 ms    Calculated P Axis 31 degrees    Calculated R Axis -21 degrees    Calculated T Axis 5 degrees    Diagnosis       Normal sinus rhythm  Possible Anterior infarct , age undetermined  When compared with ECG of 16-SEP-2020 17:55,  Nonspecific T wave abnormality, worse in Anterior leads     CBC WITH AUTOMATED DIFF Collection Time: 01/18/21  2:25 PM   Result Value Ref Range    WBC 5.2 3.6 - 11.0 K/uL    RBC 3.73 (L) 3.80 - 5.20 M/uL    HGB 10.4 (L) 11.5 - 16.0 g/dL    HCT 32.5 (L) 35.0 - 47.0 %    MCV 87.1 80.0 - 99.0 FL    MCH 27.9 26.0 - 34.0 PG    MCHC 32.0 30.0 - 36.5 g/dL    RDW 16.4 (H) 11.5 - 14.5 %    PLATELET 106 199 - 149 K/uL    MPV 10.2 8.9 - 12.9 FL    NRBC 0.0 0  WBC    ABSOLUTE NRBC 0.00 0.00 - 0.01 K/uL    NEUTROPHILS 44 32 - 75 %    LYMPHOCYTES 43 12 - 49 %    MONOCYTES 13 5 - 13 %    EOSINOPHILS 0 0 - 7 %    BASOPHILS 0 0 - 1 %    IMMATURE GRANULOCYTES 0 0.0 - 0.5 %    ABS. NEUTROPHILS 2.2 1.8 - 8.0 K/UL    ABS. LYMPHOCYTES 2.3 0.8 - 3.5 K/UL    ABS. MONOCYTES 0.7 0.0 - 1.0 K/UL    ABS. EOSINOPHILS 0.0 0.0 - 0.4 K/UL    ABS. BASOPHILS 0.0 0.0 - 0.1 K/UL    ABS. IMM. GRANS. 0.0 0.00 - 0.04 K/UL    DF AUTOMATED     METABOLIC PANEL, COMPREHENSIVE    Collection Time: 01/18/21  2:25 PM   Result Value Ref Range    Sodium 132 (L) 136 - 145 mmol/L    Potassium 4.2 3.5 - 5.1 mmol/L    Chloride 96 (L) 97 - 108 mmol/L    CO2 31 21 - 32 mmol/L    Anion gap 5 5 - 15 mmol/L    Glucose 235 (H) 65 - 100 mg/dL    BUN 30 (H) 6 - 20 MG/DL    Creatinine 1.91 (H) 0.55 - 1.02 MG/DL    BUN/Creatinine ratio 16 12 - 20      GFR est AA 34 (L) >60 ml/min/1.73m2    GFR est non-AA 28 (L) >60 ml/min/1.73m2    Calcium 8.1 (L) 8.5 - 10.1 MG/DL    Bilirubin, total 0.2 0.2 - 1.0 MG/DL    ALT (SGPT) 20 12 - 78 U/L    AST (SGOT) 22 15 - 37 U/L    Alk.  phosphatase 108 45 - 117 U/L    Protein, total 6.8 6.4 - 8.2 g/dL    Albumin 3.6 3.5 - 5.0 g/dL    Globulin 3.2 2.0 - 4.0 g/dL    A-G Ratio 1.1 1.1 - 2.2     CK W/ REFLX CKMB    Collection Time: 01/18/21  2:25 PM   Result Value Ref Range     26 - 192 U/L   TROPONIN I    Collection Time: 01/18/21  2:25 PM   Result Value Ref Range    Troponin-I, Qt. <0.05 <0.05 ng/mL   D DIMER    Collection Time: 01/18/21  2:25 PM   Result Value Ref Range    D-dimer 0.39 0.00 - 0.65 mg/L FEU   NT-PRO BNP    Collection Time: 01/18/21  2:25 PM   Result Value Ref Range    NT pro- (H) <125 PG/ML   GLUCOSE, POC    Collection Time: 01/18/21  2:32 PM   Result Value Ref Range    Glucose (POC) 266 (H) 65 - 100 mg/dL    Performed by Leonila Peralta    POC LACTIC ACID    Collection Time: 01/18/21  3:00 PM   Result Value Ref Range    Lactic Acid (POC) 0.86 0.40 - 2.00 mmol/L   SARS-COV-2    Collection Time: 01/18/21  4:44 PM   Result Value Ref Range    Specimen source Nasopharyngeal      Specimen source Nasopharyngeal      COVID-19 rapid test Not detected NOTD      Specimen type NP Swab      Health status Symptomatic Testing     GLUCOSE, POC    Collection Time: 01/18/21  6:07 PM   Result Value Ref Range    Glucose (POC) 54 (L) 65 - 100 mg/dL    Performed by Ana Avitia (RN)    GLUCOSE, POC    Collection Time: 01/18/21  6:26 PM   Result Value Ref Range    Glucose (POC) 74 65 - 100 mg/dL    Performed by Ana Avitia (RN)

## 2021-01-19 NOTE — PROGRESS NOTES
Physical Therapy    Received PT eval order. Pt in the ED but now preparing to transfer to IP bed 2132. Pt also awaiting PCR testing result. Will defer at this time and follow up tomorrow am for eval as pt is available and able to tolerate.     Meng Moreira, PT

## 2021-01-20 ENCOUNTER — TELEPHONE (OUTPATIENT)
Dept: ENDOCRINOLOGY | Age: 51
End: 2021-01-20

## 2021-01-20 LAB
ANION GAP SERPL CALC-SCNC: 2 MMOL/L (ref 5–15)
BUN SERPL-MCNC: 32 MG/DL (ref 6–20)
BUN/CREAT SERPL: 20 (ref 12–20)
CALCIUM SERPL-MCNC: 8.5 MG/DL (ref 8.5–10.1)
CHLORIDE SERPL-SCNC: 93 MMOL/L (ref 97–108)
CO2 SERPL-SCNC: 35 MMOL/L (ref 21–32)
CREAT SERPL-MCNC: 1.64 MG/DL (ref 0.55–1.02)
ERYTHROCYTE [DISTWIDTH] IN BLOOD BY AUTOMATED COUNT: 16.6 % (ref 11.5–14.5)
GLUCOSE BLD STRIP.AUTO-MCNC: 185 MG/DL (ref 65–100)
GLUCOSE BLD STRIP.AUTO-MCNC: 211 MG/DL (ref 65–100)
GLUCOSE BLD STRIP.AUTO-MCNC: 263 MG/DL (ref 65–100)
GLUCOSE BLD STRIP.AUTO-MCNC: 277 MG/DL (ref 65–100)
GLUCOSE SERPL-MCNC: 279 MG/DL (ref 65–100)
HCT VFR BLD AUTO: 34.8 % (ref 35–47)
HGB BLD-MCNC: 11.1 G/DL (ref 11.5–16)
MCH RBC QN AUTO: 28.2 PG (ref 26–34)
MCHC RBC AUTO-ENTMCNC: 31.9 G/DL (ref 30–36.5)
MCV RBC AUTO: 88.5 FL (ref 80–99)
NRBC # BLD: 0 K/UL (ref 0–0.01)
NRBC BLD-RTO: 0 PER 100 WBC
PLATELET # BLD AUTO: 230 K/UL (ref 150–400)
PMV BLD AUTO: 9.9 FL (ref 8.9–12.9)
POTASSIUM SERPL-SCNC: 4.1 MMOL/L (ref 3.5–5.1)
RBC # BLD AUTO: 3.93 M/UL (ref 3.8–5.2)
SERVICE CMNT-IMP: ABNORMAL
SODIUM SERPL-SCNC: 130 MMOL/L (ref 136–145)
WBC # BLD AUTO: 5.5 K/UL (ref 3.6–11)

## 2021-01-20 PROCEDURE — 74011000250 HC RX REV CODE- 250: Performed by: INTERNAL MEDICINE

## 2021-01-20 PROCEDURE — 82962 GLUCOSE BLOOD TEST: CPT

## 2021-01-20 PROCEDURE — 99232 SBSQ HOSP IP/OBS MODERATE 35: CPT | Performed by: CLINICAL NURSE SPECIALIST

## 2021-01-20 PROCEDURE — 74011250637 HC RX REV CODE- 250/637: Performed by: NURSE PRACTITIONER

## 2021-01-20 PROCEDURE — 97116 GAIT TRAINING THERAPY: CPT

## 2021-01-20 PROCEDURE — 74011250636 HC RX REV CODE- 250/636: Performed by: INTERNAL MEDICINE

## 2021-01-20 PROCEDURE — 74011000258 HC RX REV CODE- 258: Performed by: INTERNAL MEDICINE

## 2021-01-20 PROCEDURE — 85027 COMPLETE CBC AUTOMATED: CPT

## 2021-01-20 PROCEDURE — 80048 BASIC METABOLIC PNL TOTAL CA: CPT

## 2021-01-20 PROCEDURE — 74011250637 HC RX REV CODE- 250/637: Performed by: INTERNAL MEDICINE

## 2021-01-20 PROCEDURE — 74011250636 HC RX REV CODE- 250/636: Performed by: STUDENT IN AN ORGANIZED HEALTH CARE EDUCATION/TRAINING PROGRAM

## 2021-01-20 PROCEDURE — 94640 AIRWAY INHALATION TREATMENT: CPT

## 2021-01-20 PROCEDURE — 2709999900 HC NON-CHARGEABLE SUPPLY

## 2021-01-20 PROCEDURE — 77010033678 HC OXYGEN DAILY

## 2021-01-20 PROCEDURE — 74011636637 HC RX REV CODE- 636/637: Performed by: INTERNAL MEDICINE

## 2021-01-20 PROCEDURE — 97161 PT EVAL LOW COMPLEX 20 MIN: CPT

## 2021-01-20 PROCEDURE — 74011250637 HC RX REV CODE- 250/637: Performed by: STUDENT IN AN ORGANIZED HEALTH CARE EDUCATION/TRAINING PROGRAM

## 2021-01-20 PROCEDURE — 65660000001 HC RM ICU INTERMED STEPDOWN

## 2021-01-20 PROCEDURE — 36415 COLL VENOUS BLD VENIPUNCTURE: CPT

## 2021-01-20 PROCEDURE — 97530 THERAPEUTIC ACTIVITIES: CPT

## 2021-01-20 RX ORDER — ALBUTEROL SULFATE 90 UG/1
2 AEROSOL, METERED RESPIRATORY (INHALATION)
Status: DISCONTINUED | OUTPATIENT
Start: 2021-01-20 | End: 2021-01-22 | Stop reason: HOSPADM

## 2021-01-20 RX ORDER — ALPRAZOLAM 0.5 MG/1
1 TABLET ORAL
Status: DISCONTINUED | OUTPATIENT
Start: 2021-01-20 | End: 2021-01-22 | Stop reason: HOSPADM

## 2021-01-20 RX ADMIN — CITALOPRAM HYDROBROMIDE 40 MG: 20 TABLET ORAL at 09:54

## 2021-01-20 RX ADMIN — TRAZODONE HYDROCHLORIDE 100 MG: 100 TABLET ORAL at 22:17

## 2021-01-20 RX ADMIN — DEXAMETHASONE 6 MG: 4 TABLET ORAL at 09:54

## 2021-01-20 RX ADMIN — HYDROCODONE BITARTRATE AND ACETAMINOPHEN 1 TABLET: 7.5; 325 TABLET ORAL at 16:28

## 2021-01-20 RX ADMIN — DICLOFENAC SODIUM 4 G: 10 GEL TOPICAL at 12:28

## 2021-01-20 RX ADMIN — Medication 10 ML: at 22:16

## 2021-01-20 RX ADMIN — BUMETANIDE 1 MG: 0.25 INJECTION, SOLUTION INTRAMUSCULAR; INTRAVENOUS at 18:16

## 2021-01-20 RX ADMIN — HYDROCODONE BITARTRATE AND ACETAMINOPHEN 1 TABLET: 7.5; 325 TABLET ORAL at 22:17

## 2021-01-20 RX ADMIN — DICLOFENAC SODIUM 4 G: 10 GEL TOPICAL at 22:13

## 2021-01-20 RX ADMIN — GABAPENTIN 300 MG: 300 CAPSULE ORAL at 09:54

## 2021-01-20 RX ADMIN — HYDROCODONE BITARTRATE AND ACETAMINOPHEN 1 TABLET: 7.5; 325 TABLET ORAL at 06:48

## 2021-01-20 RX ADMIN — HUMAN INSULIN 10 UNITS: 100 INJECTION, SUSPENSION SUBCUTANEOUS at 22:15

## 2021-01-20 RX ADMIN — CARVEDILOL 6.25 MG: 6.25 TABLET, FILM COATED ORAL at 09:54

## 2021-01-20 RX ADMIN — Medication 10 ML: at 16:30

## 2021-01-20 RX ADMIN — PANTOPRAZOLE SODIUM 40 MG: 40 TABLET, DELAYED RELEASE ORAL at 09:54

## 2021-01-20 RX ADMIN — ENOXAPARIN SODIUM 40 MG: 40 INJECTION SUBCUTANEOUS at 22:13

## 2021-01-20 RX ADMIN — ALBUTEROL SULFATE 2 PUFF: 90 AEROSOL, METERED RESPIRATORY (INHALATION) at 13:33

## 2021-01-20 RX ADMIN — BUMETANIDE 1 MG: 0.25 INJECTION, SOLUTION INTRAMUSCULAR; INTRAVENOUS at 09:53

## 2021-01-20 RX ADMIN — AZITHROMYCIN MONOHYDRATE 500 MG: 500 INJECTION, POWDER, LYOPHILIZED, FOR SOLUTION INTRAVENOUS at 09:53

## 2021-01-20 RX ADMIN — Medication 10 ML: at 02:47

## 2021-01-20 RX ADMIN — CEFTRIAXONE 1 G: 1 INJECTION, POWDER, FOR SOLUTION INTRAMUSCULAR; INTRAVENOUS at 09:54

## 2021-01-20 RX ADMIN — ALPRAZOLAM 1 MG: 0.5 TABLET ORAL at 04:09

## 2021-01-20 RX ADMIN — HUMAN INSULIN 30 UNITS: 100 INJECTION, SUSPENSION SUBCUTANEOUS at 09:52

## 2021-01-20 RX ADMIN — GABAPENTIN 300 MG: 300 CAPSULE ORAL at 18:17

## 2021-01-20 RX ADMIN — CLOPIDOGREL BISULFATE 75 MG: 75 TABLET ORAL at 09:54

## 2021-01-20 RX ADMIN — ENOXAPARIN SODIUM 40 MG: 40 INJECTION SUBCUTANEOUS at 09:53

## 2021-01-20 RX ADMIN — ALBUTEROL SULFATE 2 PUFF: 90 AEROSOL, METERED RESPIRATORY (INHALATION) at 19:32

## 2021-01-20 RX ADMIN — ALPRAZOLAM 1 MG: 0.5 TABLET ORAL at 16:28

## 2021-01-20 RX ADMIN — DICLOFENAC SODIUM 4 G: 10 GEL TOPICAL at 18:18

## 2021-01-20 NOTE — PROGRESS NOTES
Problem: Mobility Impaired (Adult and Pediatric)  Goal: *Acute Goals and Plan of Care (Insert Text)  Description: FUNCTIONAL STATUS PRIOR TO ADMISSION: Patient reports she is on disability but was able to drive and ambulate in community, was not using her 02 but still has concentrator at home. Patient reports she had a fall about one year ago with injury to right knee, and it is still painful, has referral to see Dr. Albert Reed as outpatient. HOME SUPPORT PRIOR TO ADMISSION: Patient lives with her boyfriend, was indep with ADLs. Physical Therapy Goals  Initiated 1/20/2021  1. Patient will move from supine to sit and sit to supine  in bed with independence within 7 day(s). 2.  Patient will transfer from bed to chair and chair to bed with independence using the least restrictive device within 7 day(s). 3.  Patient will perform sit to stand with independence within 7 day(s). 4.  Patient will ambulate with supervision/set-up for 100 feet with the least restrictive device within 7 day(s). Outcome: Not Met   PHYSICAL THERAPY EVALUATION  Patient: Sonam Jeffrey (12 y.o. female)  Date: 1/20/2021  Primary Diagnosis: Respiratory failure, acute ([address] [J96.00]  COPD exacerbation ([address] [J44.1]  Sepsis ([facility] [address] [A41.9]        Precautions:       ASSESSMENT  Based on the objective data described below, the patient presents with decreased tolerance of activity, general weakness, right knee pain and swelling, unsteady gait and impaired functional mobility following admission for Covid related respiratory failure. Patient received in bed and agreeable to participate, on 4 liters 02 with sats above 95% at rest.  Patient able to come to EOB with SBA, can sit unsupported and maintain balance and demonstrates full ROM right knee, mild peripatellar swelling noted. Patient ambulated in room with mild unsteadiness, therapist to provide min assist for safety, sats at 93% post activity.   Patient left sitting up in chair with call bell in reach, expect she will be able to discharge home with no further services if she progresses well as inpatient. Patient has 02 at home and was educated importance of using as directed,  use of pulse oximeter to monitor sats, pacing of activity, and falls prevention. Current Level of Function Impacting Discharge (mobility/balance): CGA to min assist when ambulating    Functional Outcome Measure: The patient scored 65/100 on the Barthel outcome measure which is indicative of moderate functional impairment     Other factors to consider for discharge: below baseline, Covid +     Patient will benefit from skilled therapy intervention to address the above noted impairments. PLAN :  Recommendations and Planned Interventions: bed mobility training, transfer training, gait training, therapeutic exercises, patient and family training/education, and therapeutic activities      Frequency/Duration: Patient will be followed by physical therapy:  3 times a week to address goals. Recommendation for discharge: (in order for the patient to meet his/her long term goals)  To be determined: will likely be able to dc home without further services    This discharge recommendation:  A follow-up discussion with the attending provider and/or case management is planned    IF patient discharges home will need the following DME: patient owns DME required for discharge         SUBJECTIVE:   Patient stated I do sometimes lose my balance backwards.     OBJECTIVE DATA SUMMARY:   HISTORY:    Past Medical History:   Diagnosis Date    Achilles tendon rupture     along with torn right patellar/femoral ligament    Arthritis     rt. foot    Chronic kidney disease     Chronic pain     abdominal pain    Diabetes (HCC)     IDDM on insulin pump and sensor    DM type 1 (diabetes mellitus, type 1) ([facility])     age 25    Endometriosis     s/p ex-lap 4x    Gastric ulcer     GERD (gastroesophageal reflux disease)     Herpes     Other and unspecified hyperlipidemia     Panic attacks     Psychiatric disorder     anxiety, panic attacks    PTSD (post-traumatic stress disorder)     Unspecified essential hypertension      Past Surgical History:   Procedure Laterality Date    HX COLONOSCOPY      HX GYN      2 d&c's    HX GYN      laparoscopies x5 for endometriosis    HX GYN      mirena in place    HX ORTHOPAEDIC  2002    achiles tendon repair,talus repair    HX ORTHOPAEDIC      injections x2 to right shoulder    HX ORTHOPAEDIC Left 02/07/2019    3rd trigger finger release       Personal factors and/or comorbidities impacting plan of care: disabled, psych history, smoker    Home Situation  Home Environment: Private residence  # Steps to Enter: 3  Rails to Enter: Yes  Hand Rails : Right  One/Two Story Residence: One story  Living Alone: No  Support Systems: Spouse/Significant Other/Partner  Patient Expects to be Discharged to[de-identified] Private residence  Current DME Used/Available at Home: Redgie Blocker, straight, Walker  Tub or Shower Type: Tub/Shower combination    EXAMINATION/PRESENTATION/DECISION MAKING:   Critical Behavior:              Hearing: Auditory  Auditory Impairment: None  Range Of Motion:  AROM: Within functional limits                       Strength:    Strength: Generally decreased, functional                    Tone & Sensation:   Tone: Normal                              Coordination:  Coordination: Within functional limits  Vision:      Functional Mobility:  Bed Mobility:  Rolling: Stand-by assistance  Supine to Sit: Stand-by assistance        Transfers:  Sit to Stand: Contact guard assistance  Stand to Sit: Contact guard assistance        Bed to Chair: Contact guard assistance              Balance:   Sitting: Intact  Standing: Impaired  Standing - Static: Good; Unsupported  Standing - Dynamic : Fair;Unsupported  Ambulation/Gait Training:  Distance (ft): 30 Feet (ft)  Assistive Device: Gait belt  Ambulation - Level of Assistance: Contact guard assistance;Minimal assistance        Gait Abnormalities: Decreased step clearance;Trunk sway increased              Speed/Sheba: Pace decreased (<100 feet/min)  Step Length: Left shortened;Right shortened                     Stairs: Therapeutic Exercises: Ankle pumps, quad set, glute set, hip erir    Functional Measure:  Barthel Index:    Bathin  Bladder: 10  Bowels: 10  Groomin  Dressing: 10  Feeding: 10  Mobility: 0  Stairs: 0  Toilet Use: 10  Transfer (Bed to Chair and Back): 10  Total: 65/100       The Barthel ADL Index: Guidelines  1. The index should be used as a record of what a patient does, not as a record of what a patient could do. 2. The main aim is to establish degree of independence from any help, physical or verbal, however minor and for whatever reason. 3. The need for supervision renders the patient not independent. 4. A patient's performance should be established using the best available evidence. Asking the patient, friends/relatives and nurses are the usual sources, but direct observation and common sense are also important. However direct testing is not needed. 5. Usually the patient's performance over the preceding 24-48 hours is important, but occasionally longer periods will be relevant. 6. Middle categories imply that the patient supplies over 50 per cent of the effort. 7. Use of aids to be independent is allowed. Ramo Hutton., Barthel, D.W. (0838). Functional evaluation: the Barthel Index. [address] [facility] (14)2. FRANTZ Shipman, Sharifa Barrera., Nick Ortiz., [place], [address] [place] [address] (). Measuring the change indisability after inpatient rehabilitation; comparison of the responsiveness of the Barthel Index and Functional Marshall Measure. Journal of Neurology, Neurosurgery, and Psychiatry, 66(4), 683-016.   Oral Sergei, NDEE.A, VIRI SoaresJ.M, & Johnathan Blackwood MNaseemA. (2004.) Assessment of post-stroke quality of life in cost-effectiveness studies: The usefulness of the Barthel Index and the EuroQoL-5D. Quality of Life Research, 15, 761-88           Physical Therapy Evaluation Charge Determination   History Examination Presentation Decision-Making   MEDIUM  Complexity : 1-2 comorbidities / personal factors will impact the outcome/ POC  MEDIUM Complexity : 3 Standardized tests and measures addressing body structure, function, activity limitation and / or participation in recreation  MEDIUM Complexity : Evolving with changing characteristics  MEDIUM Complexity : FOTO score of 26-74      Based on the above components, the patient evaluation is determined to be of the following complexity level: MEDIUM    Pain Rating:      Activity Tolerance:   Fair, desaturates with exertion and requires oxygen, and requires rest breaks    After treatment patient left in no apparent distress:   Sitting in chair and Call bell within reach    COMMUNICATION/EDUCATION:   The patients plan of care was discussed with: Registered nurse. Fall prevention education was provided and the patient/caregiver indicated understanding. and Patient/family agree to work toward stated goals and plan of care.     Thank you for this referral.  Jhon Banda, PT   Time Calculation: 37 mins

## 2021-01-20 NOTE — PROGRESS NOTES
1900: End of Shift Note    Bedside shift change report given to Evangelista Boykin RN (oncoming nurse) by Ronal Hsu (offgoing nurse). Report included the following information SBAR    Shift worked:  [id number]     Shift summary and any significant changes:          Concerns for physician to address:       Zone phone for oncoming shift:   [phone]       Activity:  Activity Level: Up with Assistance  Number times ambulated in hallways past shift: 0  Number of times OOB to chair past shift: 1    Cardiac:   Cardiac Monitoring: Yes           Access:   Current line(s): PIV     Genitourinary:   Urinary status: voiding    Respiratory:   O2 Device: Nasal cannula  Chronic home O2 use?: NO  Incentive spirometer at bedside: NO     GI:  Last Bowel Movement Date: 01/18/21  Current diet:  DIET CARDIAC Regular  Passing flatus: YES  Tolerating current diet: YES       Pain Management:   Patient states pain is manageable on current regimen: YES    Skin:  Joey Score: 20  Interventions: increase time out of bed, internal/external urinary devices and nutritional support     Patient Safety:  Fall Score:  Total Score: 2  Interventions: bed/chair alarm, assistive device (walker, cane, etc), gripper socks and pt to call before getting OOB       Length of Stay:  Expected LOS: - - -  Actual LOS: [address]

## 2021-01-20 NOTE — DIABETES MGMT
[facility]     CLINICAL NURSE SPECIALIST CONSULT   FOLLOWUP NOTE     Presentation   Caitlin Adhikari is a 53 y.o. female admitted 1/18/21 from the ER with dyspnea. Afebrile, Normotensive. . AG 5. GFR 28. Normal liver parameters. WBC 5.2. Troponin Negative. D-Dimer 0.39. Lactic acid 0.86. NT pro-. CXR: Lower lobe airspace disease  LE Duplex: Negative for DVT  Knee Xray: Osteoarthritis with small joint effusion. DX: COVID +. Dr. Aide Robledo note acknowledged. The one-time NPH insulin dose was recommended to address hyperglycemia (), not to override steroids. Agree that NPH insulin to override steroids is in order as BGs into the 400s last evening after Prednisone. But now patient switched to dexamethasone, which has a 24 hour impact, so would give NPH insulin 20 units twice daily. Since Dr. Laurita Wharton is seeing patient virtually, will defer to him and sign off.     Yudi Telles DNP, RN, ACNS-BC, BC-ADM, CDE  Clinical Nurse Specialist-Diabetes & Endocrine disorders  [facility] (Ambulatory \"Specialty\" practice)  (L)  [phone]  (CGO)  [phone]

## 2021-01-20 NOTE — PROGRESS NOTES
1900 Received report from hospitals. Assumed care of patient. 2140 Pt's blood sugar is 439. Pt has insulin pump. Pt states she will give herself 15.5 units of insulin. Nurse practitioner on call aware.

## 2021-01-20 NOTE — PROGRESS NOTES
Received notification from bedside RN about patient with regards to: request to resume her home anxiety medication  VS: /58, HR 65, RR 18, O2 sat 93% on NC 4L    Intervention given: ordered Xanax prn

## 2021-01-20 NOTE — PROGRESS NOTES
Endocrinology Progress Note    Called up to the floor after receiving Dr. Naldo Das message and spoke with Freddy Hogan as I wasn't sure if what patient was really asking for was insulin to fill her pump while in the hospital and not that she needed refills as an outpatient and Freddy Hogan said that is what was needed but patient's boyfriend is bring up a vial of novolog to fill the pump reservoir so an order is not needed to use a vial of humalog from the hospital at this time.     Hector Paredes MD

## 2021-01-20 NOTE — PROGRESS NOTES
Endocrinology Progress Note    Reviewed events over the past 24 hours and her sugars came down overnight and today after receiving the 25 units of NPH and getting the 30 units of NPH this morning. I saw that Ruba Mckeon has signed off and will defer to me. She recommended considering NPH 20 units every 12 hours because of decadron's 24 hour effect and since she already received 30 units this morning, I will give a dose of 10 units at bedtime tonight to keep her sugars from spiking overnight. I called and spoke to patient tonight over the phone and updated her on my recommendations and she is agreeable to this plan. Please don't hesitate to send me a message through perfect serve with any questions or concerns.     Shanda Mensah MD

## 2021-01-20 NOTE — PROGRESS NOTES
1900 Received report from [facility]. Assumed care of patient. 2140 Pt's blood sugar is 439. Pt has insulin pump. Pt states she will give herself 15.5 units of insulin. Nurse practitioner on call aware. End of Shift Note Bedside shift change report given to Hudson Cornejo RN (oncoming nurse) by Naomie Purvis RN (offgoing nurse). Report included the following information SBAR Shift worked:  7pm-7am 
  
Shift summary and any significant changes:  
  See above Concerns for physician to address:   
  
Zone phone for oncoming shift:    
  
 
Activity: 
Activity Level: Up with Assistance Number times ambulated in hallways past shift: 0 Number of times OOB to chair past shift: 0 Cardiac:  
Cardiac Monitoring: Yes     
Cardiac Rhythm: Normal sinus rhythm Access:  
Current line(s): PIV Genitourinary:  
Urinary status: voiding Respiratory:  
O2 Device: Nasal cannula Chronic home O2 use?: YES Incentive spirometer at bedside: NO 
  
GI: 
Last Bowel Movement Date: 01/18/21 Current diet:  DIET CARDIAC Regular Passing flatus: YES Tolerating current diet: YES Pain Management:  
Patient states pain is manageable on current regimen: YES Skin: 
Joey Score: 20 Interventions: float heels Patient Safety: 
Fall Score: Total Score: 2 Interventions: pt to call before getting OOB Length of Stay: 
Expected LOS: - - - Actual LOS: 2 Naomie Purvis RN

## 2021-01-20 NOTE — PROGRESS NOTES
Problem: Falls - Risk of  Goal: *Absence of Falls  Description: Document Deborah Fall Risk and appropriate interventions in the flowsheet.  Outcome: Progressing Towards Goal  Note: Fall Risk Interventions:  Mobility Interventions: Bed/chair exit alarm, Communicate number of staff needed for ambulation/transfer, Patient to call before getting OOB, Utilize walker, cane, or other assistive device         Medication Interventions: Bed/chair exit alarm, Patient to call before getting OOB, Teach patient to arise slowly

## 2021-01-20 NOTE — TELEPHONE ENCOUNTER
----- Message from Ramon Ramirez sent at 1/20/2021  4:16 PM EST -----  Regarding: Dr. Jeff Schuler: [phone]  General Message/Vendor Calls    Caller's first and last name: Pt      Reason for call: Patient is in the hospital for [facility]. Callback required yes/no and why: Yes, check up.        Best contact number(s):[phone]      Details to clarify the request:n/a      Ramon Ramirez

## 2021-01-20 NOTE — PROGRESS NOTES
Problem: Falls - Risk of  Goal: *Absence of Falls  Description: Document Noemi Forman Fall Risk and appropriate interventions in the flowsheet.   Outcome: Progressing Towards Goal  Note: Fall Risk Interventions:  Mobility Interventions: Bed/chair exit alarm, Patient to call before getting OOB         Medication Interventions: Bed/chair exit alarm, Patient to call before getting OOB

## 2021-01-20 NOTE — PROGRESS NOTES
Hospitalist Progress Note    NAME: Claudette Springer   :  1970   MRN:  [id number]       Assessment / Plan:  Acute on chronic hypoxic respiratory failure  Due to diastolic congestive heart failure POA  COVID-19 infection  community-acquired pneumonia  Right lower extremity pain and swelling  Hypervolemic hyponatremia  -WBC 5.2, procalcitonin level is less than 0.05  -Initially patient was on 6 L nasal cannula with saturation around 92-93, slowly improved, currently on 3 L   Patient use home oxygen as needed specially on ambulation.  -Chest x-ray shows lower lobe airspace disease  -Continue Rocephin and azithromycin .  -Rapid Covid negative, PCR is positive.  -Albuterol every 6 hours.  -Continue dexamethasone  -Continue Bumex to 1 mg IV twice daily as patient is still hypervolemic  -I's and O's  -Daily weight  -Recent echocardiogram in August showed EF of 55 to 60%   -Right lower extremity Doppler did not show any evidence of deep vein thrombosis  -Swelling is likely due to edema and fluid retention  -Renal function while being diuresed  -Sodium level improved to 130. Patient is still hypervolemic  -Diuresis as above, monitor BMP     hypertension  Anxiety disorder  Diabetic neuropathy  CAD  Insomnia  -Continue the home medications.     Diabetes type 2 hyperglycemia  Patient has insulin pump.   She does not want to take it off in is giving boluses according to the blood sugars  HbA1c 1 week ago was 9.6.     Arthritis of the knees on narcotic treatment  -Continue fentanyl patch, diclofenac gel for the knee.     Tobacco abuse  Smokes 1 and half pack a day, on nicotine patch.     PT consulted for assessing the oxygen requirement at home.     Code Status: Full code  Surrogate Decision Maker:      DVT Prophylaxis: Lovenox  GI Prophylaxis: not indicated     Baseline: Ambulatory with assistance at home       Subjective:     Chief Complaint / Reason for Physician Visit  \" Reports improvement in shortness of breath, continues to report right knee pain. Denies any fever or chills. Currently 3 L\". Discussed with RN events overnight. Review of Systems:  Symptom Y/N Comments  Symptom Y/N Comments   Fever/Chills n   Chest Pain n    Poor Appetite n   Edema y    Cough n   Abdominal Pain n    Sputum n   Joint Pain     SOB/HERNÁNDEZ n   Pruritis/Rash     Nausea/vomit n   Tolerating PT/OT     Diarrhea n   Tolerating Diet     Constipation n   Other       Could NOT obtain due to:      Objective:     VITALS:   Last 24hrs VS reviewed since prior progress note. Most recent are:  Patient Vitals for the past 24 hrs:   Temp Pulse Resp BP SpO2   01/20/21 1333 -- -- -- -- 98 %   01/20/21 1130 98.5 °F (36.9 °C) 64 20 107/74 94 %   01/20/21 0836 98 °F (36.7 °C) 72 19 123/74 93 %   01/20/21 0243 98.3 °F (36.8 °C) 65 18 (!) 129/58 --   01/19/21 2329 -- -- -- -- 93 %   01/19/21 2248 98.7 °F (37.1 °C) 68 20 107/60 93 %   01/19/21 2154 -- -- -- -- 96 %   01/19/21 2006 -- -- -- -- 92 %   01/19/21 1950 98.9 °F (37.2 °C) 65 18 135/78 95 %   01/19/21 1828 -- 66 -- (!) 144/66 --   01/19/21 1629 -- -- -- -- 94 %   01/19/21 1536 98.7 °F (37.1 °C) 70 16 (!) 144/73 90 %       Intake/Output Summary (Last 24 hours) at 1/20/2021 1451  Last data filed at 1/20/2021 0246  Gross per 24 hour   Intake --   Output 300 ml   Net -300 ml   Face-to-face encounter was provided on January 20, 2021 with the following findings    PHYSICAL EXAM:  General: WD, WN. Alert, cooperative, no acute distress    EENT:  EOMI. Anicteric sclerae. MMM  Resp:  CTA bilaterally, no wheezing or rales. No accessory muscle use  CV:  Regular  rhythm, 3+ edema bilateral lower extremities  GI:  Soft, Non distended, Non tender.  +Bowel sounds  Neurologic:  Alert and oriented X 3, normal speech,   Psych:   Good insight. Not anxious nor agitated  Skin:  No rashes.   No jaundice    Reviewed most current lab test results and cultures  YES  Reviewed most current radiology test results   YES  Review and summation of old records today    NO  Reviewed patient's current orders and MAR    YES  PMH/SH reviewed - no change compared to H&P  ________________________________________________________________________  Care Plan discussed with:    Comments   Patient x    Family      RN x    Care Manager     Consultant                        Multidiciplinary team rounds were held today with , nursing, pharmacist and clinical coordinator. Patient's plan of care was discussed; medications were reviewed and discharge planning was addressed. ________________________________________________________________________  Total NON critical care TIME:  35   Minutes    Total CRITICAL CARE TIME Spent:   Minutes non procedure based      Comments   >50% of visit spent in counseling and coordination of care     ________________________________________________________________________  Reinaldo Zaragoza MD     Procedures: see electronic medical records for all procedures/Xrays and details which were not copied into this note but were reviewed prior to creation of Plan. LABS:  I reviewed today's most current labs and imaging studies.   Pertinent labs include:  Recent Labs     01/20/21  0230 01/19/21  0325 01/18/21  1425   WBC 5.5 4.2 5.2   HGB 11.1* 10.8* 10.4*   HCT 34.8* 34.3* 32.5*    226 237     Recent Labs     01/20/21  0230 01/19/21  0325 01/18/21  1425   * 128* 132*   K 4.1 5.8* 4.2   CL 93* 93* 96*   CO2 35* 30 31   * 446* 235*   BUN 32* 34* 30*   CREA 1.64* 1.83* 1.91*   CA 8.5 8.0* 8.1*   MG  --  2.0  --    ALB  --   --  3.6   TBILI  --   --  0.2   ALT  --   --  20       Signed: Reinaldo Zaragoza MD

## 2021-01-21 LAB
ANION GAP SERPL CALC-SCNC: 3 MMOL/L (ref 5–15)
BUN SERPL-MCNC: 27 MG/DL (ref 6–20)
BUN/CREAT SERPL: 20 (ref 12–20)
CALCIUM SERPL-MCNC: 9 MG/DL (ref 8.5–10.1)
CHLORIDE SERPL-SCNC: 93 MMOL/L (ref 97–108)
CO2 SERPL-SCNC: 36 MMOL/L (ref 21–32)
CREAT SERPL-MCNC: 1.33 MG/DL (ref 0.55–1.02)
ERYTHROCYTE [DISTWIDTH] IN BLOOD BY AUTOMATED COUNT: 16.8 % (ref 11.5–14.5)
GLUCOSE BLD STRIP.AUTO-MCNC: 140 MG/DL (ref 65–100)
GLUCOSE BLD STRIP.AUTO-MCNC: 161 MG/DL (ref 65–100)
GLUCOSE BLD STRIP.AUTO-MCNC: 195 MG/DL (ref 65–100)
GLUCOSE BLD STRIP.AUTO-MCNC: 207 MG/DL (ref 65–100)
GLUCOSE BLD STRIP.AUTO-MCNC: 214 MG/DL (ref 65–100)
GLUCOSE BLD STRIP.AUTO-MCNC: 279 MG/DL (ref 65–100)
GLUCOSE SERPL-MCNC: 151 MG/DL (ref 65–100)
HCT VFR BLD AUTO: 39.7 % (ref 35–47)
HGB BLD-MCNC: 12.4 G/DL (ref 11.5–16)
MCH RBC QN AUTO: 27.8 PG (ref 26–34)
MCHC RBC AUTO-ENTMCNC: 31.2 G/DL (ref 30–36.5)
MCV RBC AUTO: 89 FL (ref 80–99)
NRBC # BLD: 0 K/UL (ref 0–0.01)
NRBC BLD-RTO: 0 PER 100 WBC
PLATELET # BLD AUTO: 236 K/UL (ref 150–400)
PMV BLD AUTO: 10.1 FL (ref 8.9–12.9)
POTASSIUM SERPL-SCNC: 3.7 MMOL/L (ref 3.5–5.1)
RBC # BLD AUTO: 4.46 M/UL (ref 3.8–5.2)
SERVICE CMNT-IMP: ABNORMAL
SODIUM SERPL-SCNC: 132 MMOL/L (ref 136–145)
WBC # BLD AUTO: 4.1 K/UL (ref 3.6–11)

## 2021-01-21 PROCEDURE — 36415 COLL VENOUS BLD VENIPUNCTURE: CPT

## 2021-01-21 PROCEDURE — 85027 COMPLETE CBC AUTOMATED: CPT

## 2021-01-21 PROCEDURE — 74011250637 HC RX REV CODE- 250/637: Performed by: STUDENT IN AN ORGANIZED HEALTH CARE EDUCATION/TRAINING PROGRAM

## 2021-01-21 PROCEDURE — 82962 GLUCOSE BLOOD TEST: CPT

## 2021-01-21 PROCEDURE — 65660000001 HC RM ICU INTERMED STEPDOWN

## 2021-01-21 PROCEDURE — 74011000250 HC RX REV CODE- 250: Performed by: INTERNAL MEDICINE

## 2021-01-21 PROCEDURE — 74011250636 HC RX REV CODE- 250/636: Performed by: INTERNAL MEDICINE

## 2021-01-21 PROCEDURE — 74011250637 HC RX REV CODE- 250/637: Performed by: INTERNAL MEDICINE

## 2021-01-21 PROCEDURE — 74011250636 HC RX REV CODE- 250/636: Performed by: STUDENT IN AN ORGANIZED HEALTH CARE EDUCATION/TRAINING PROGRAM

## 2021-01-21 PROCEDURE — 80048 BASIC METABOLIC PNL TOTAL CA: CPT

## 2021-01-21 PROCEDURE — 74011000258 HC RX REV CODE- 258: Performed by: INTERNAL MEDICINE

## 2021-01-21 PROCEDURE — 74011250637 HC RX REV CODE- 250/637: Performed by: NURSE PRACTITIONER

## 2021-01-21 PROCEDURE — 94640 AIRWAY INHALATION TREATMENT: CPT

## 2021-01-21 PROCEDURE — 77010033678 HC OXYGEN DAILY

## 2021-01-21 PROCEDURE — 74011636637 HC RX REV CODE- 636/637: Performed by: INTERNAL MEDICINE

## 2021-01-21 RX ADMIN — ACETAMINOPHEN 650 MG: 325 TABLET ORAL at 22:58

## 2021-01-21 RX ADMIN — DICLOFENAC SODIUM 4 G: 10 GEL TOPICAL at 10:31

## 2021-01-21 RX ADMIN — BUMETANIDE 1 MG: 0.25 INJECTION, SOLUTION INTRAMUSCULAR; INTRAVENOUS at 17:49

## 2021-01-21 RX ADMIN — PANTOPRAZOLE SODIUM 40 MG: 40 TABLET, DELAYED RELEASE ORAL at 10:14

## 2021-01-21 RX ADMIN — CITALOPRAM HYDROBROMIDE 40 MG: 20 TABLET ORAL at 10:14

## 2021-01-21 RX ADMIN — Medication 10 ML: at 13:41

## 2021-01-21 RX ADMIN — ALBUTEROL SULFATE 2 PUFF: 90 AEROSOL, METERED RESPIRATORY (INHALATION) at 13:49

## 2021-01-21 RX ADMIN — CEFTRIAXONE 1 G: 1 INJECTION, POWDER, FOR SOLUTION INTRAMUSCULAR; INTRAVENOUS at 12:20

## 2021-01-21 RX ADMIN — TRAZODONE HYDROCHLORIDE 100 MG: 100 TABLET ORAL at 22:58

## 2021-01-21 RX ADMIN — ALBUTEROL SULFATE 2 PUFF: 90 AEROSOL, METERED RESPIRATORY (INHALATION) at 07:33

## 2021-01-21 RX ADMIN — ALPRAZOLAM 1 MG: 0.5 TABLET ORAL at 22:58

## 2021-01-21 RX ADMIN — Medication 10 ML: at 22:00

## 2021-01-21 RX ADMIN — CLOPIDOGREL BISULFATE 75 MG: 75 TABLET ORAL at 10:13

## 2021-01-21 RX ADMIN — ALBUTEROL SULFATE 2 PUFF: 90 AEROSOL, METERED RESPIRATORY (INHALATION) at 01:24

## 2021-01-21 RX ADMIN — ALPRAZOLAM 1 MG: 0.5 TABLET ORAL at 13:41

## 2021-01-21 RX ADMIN — GABAPENTIN 300 MG: 300 CAPSULE ORAL at 10:14

## 2021-01-21 RX ADMIN — DEXAMETHASONE 6 MG: 4 TABLET ORAL at 10:13

## 2021-01-21 RX ADMIN — DICLOFENAC SODIUM 4 G: 10 GEL TOPICAL at 17:51

## 2021-01-21 RX ADMIN — HUMAN INSULIN 30 UNITS: 100 INJECTION, SUSPENSION SUBCUTANEOUS at 10:14

## 2021-01-21 RX ADMIN — ENOXAPARIN SODIUM 40 MG: 40 INJECTION SUBCUTANEOUS at 22:58

## 2021-01-21 RX ADMIN — BUMETANIDE 1 MG: 0.25 INJECTION, SOLUTION INTRAMUSCULAR; INTRAVENOUS at 10:14

## 2021-01-21 RX ADMIN — HYDROCODONE BITARTRATE AND ACETAMINOPHEN 1 TABLET: 7.5; 325 TABLET ORAL at 10:13

## 2021-01-21 RX ADMIN — HUMAN INSULIN 10 UNITS: 100 INJECTION, SUSPENSION SUBCUTANEOUS at 22:57

## 2021-01-21 RX ADMIN — ENOXAPARIN SODIUM 40 MG: 40 INJECTION SUBCUTANEOUS at 10:14

## 2021-01-21 RX ADMIN — HYDROCODONE BITARTRATE AND ACETAMINOPHEN 1 TABLET: 7.5; 325 TABLET ORAL at 16:30

## 2021-01-21 RX ADMIN — ALPRAZOLAM 1 MG: 0.5 TABLET ORAL at 00:08

## 2021-01-21 RX ADMIN — HYDROCODONE BITARTRATE AND ACETAMINOPHEN 1 TABLET: 7.5; 325 TABLET ORAL at 04:12

## 2021-01-21 RX ADMIN — AZITHROMYCIN MONOHYDRATE 500 MG: 500 INJECTION, POWDER, LYOPHILIZED, FOR SOLUTION INTRAVENOUS at 10:26

## 2021-01-21 RX ADMIN — CARVEDILOL 6.25 MG: 6.25 TABLET, FILM COATED ORAL at 10:14

## 2021-01-21 RX ADMIN — DICLOFENAC SODIUM 4 G: 10 GEL TOPICAL at 12:24

## 2021-01-21 RX ADMIN — GABAPENTIN 300 MG: 300 CAPSULE ORAL at 17:51

## 2021-01-21 RX ADMIN — ACETAMINOPHEN 650 MG: 325 TABLET ORAL at 08:05

## 2021-01-21 NOTE — PROGRESS NOTES
Hospitalist Progress Note    NAME: Claudette Springer   :  1970   MRN:  [id number]       Assessment / Plan:  Acute on chronic hypoxic respiratory failure  Due to diastolic congestive heart failure POA  COVID-19 infection  community-acquired pneumonia  Right lower extremity pain and swelling  Hypervolemic hyponatremia  -WBC 5.2, procalcitonin level is less than 0.05  -Initially patient was on 6 L nasal cannula with saturation around 92-93, slowly improved, currently on 4 L   Patient use home oxygen as needed specially on ambulation.  -Chest x-ray shows lower lobe airspace disease  -We will discontinue Rocephin and azithromycin as procalcitonin levels are low. -Airspace disease noticed on chest x-ray is likely due to congestive heart failure. -Rapid Covid negative, PCR is positive.  -Albuterol every 6 hours.  -Continue dexamethasone  -Continue Bumex to 1 mg IV twice daily as patient is still hypervolemic  -I's and O's  -Daily weight  -Recent echocardiogram in August showed EF of 55 to 60%   -Right lower extremity Doppler did not show any evidence of deep vein thrombosis  -Swelling is likely due to edema and fluid retention  -Renal function while being diuresed  -Sodium level improved to 132. Patient is still hypervolemic  -Diuresis as above, monitor BMP   -Was not a candidate for remdesivir due to renal function    hypertension  Anxiety disorder  Diabetic neuropathy  CAD  Insomnia  -Continue the home medications.     Diabetes type 2 hyperglycemia  Patient has insulin pump.   She does not want to take it off in is giving boluses according to the blood sugars  HbA1c 1 week ago was 9.6.     Arthritis of the knees on narcotic treatment  -Continue fentanyl patch, diclofenac gel for the knee.     Tobacco abuse  Smokes 1 and half pack a day, on nicotine patch.     PT consulted for assessing the oxygen requirement at home.     Code Status: Full code  Surrogate Decision Maker:      DVT Prophylaxis: Lovenox  GI Prophylaxis: not indicated     Baseline: Ambulatory with assistance at home       Subjective:     Chief Complaint / Reason for Physician Visit  \" Reports improvement in shortness of breath, continues to report right knee pain. Denies any fever or chills. Currently 4 L\". Discussed with RN events overnight. Review of Systems:  Symptom Y/N Comments  Symptom Y/N Comments   Fever/Chills n   Chest Pain n    Poor Appetite n   Edema y    Cough n   Abdominal Pain n    Sputum n   Joint Pain     SOB/HERNÁNDEZ n   Pruritis/Rash     Nausea/vomit n   Tolerating PT/OT     Diarrhea n   Tolerating Diet     Constipation n   Other       Could NOT obtain due to:      Objective:     VITALS:   Last 24hrs VS reviewed since prior progress note. Most recent are:  Patient Vitals for the past 24 hrs:   Temp Pulse Resp BP SpO2   01/21/21 1350 -- -- -- -- 96 %   01/21/21 1032 98.6 °F (37 °C) 64 20 116/68 95 %   01/21/21 1014 -- 67 -- -- --   01/21/21 1013 -- 67 -- -- --   01/21/21 0804 98.9 °F (37.2 °C) 71 20 (!) 141/77 97 %   01/21/21 0734 -- -- -- -- 95 %   01/21/21 0527 -- -- -- -- 94 %   01/21/21 0241 98.5 °F (36.9 °C) (!) 58 20 (!) 147/77 (!) 87 %   01/21/21 0126 -- -- -- -- 96 %   01/20/21 2318 98.4 °F (36.9 °C) 61 20 (!) 149/85 91 %   01/20/21 2017 98.6 °F (37 °C) 65 20 (!) 153/69 93 %   01/20/21 1932 -- -- -- -- 92 %   01/20/21 1816 -- 97 -- 135/61 --   01/20/21 1529 99.8 °F (37.7 °C) 64 20 (!) 120/55 92 %       Intake/Output Summary (Last 24 hours) at 1/21/2021 1514  Last data filed at 1/21/2021 1343  Gross per 24 hour   Intake 1140 ml   Output 2250 ml   Net -1110 ml   Face-to-face encounter was provided on January 21, 2021 with the following findings    PHYSICAL EXAM:  General: WD, WN. Alert, cooperative, no acute distress    EENT:  EOMI. Anicteric sclerae. MMM  Resp:  CTA bilaterally, no wheezing or rales. No accessory muscle use  CV:  Regular  rhythm, 2+ edema bilateral lower extremities  GI:  Soft, Non distended, Non tender.  +Bowel sounds  Neurologic:  Alert and oriented X 3, normal speech,   Psych:   Good insight. Not anxious nor agitated  Skin:  No rashes. No jaundice    Reviewed most current lab test results and cultures  YES  Reviewed most current radiology test results   YES  Review and summation of old records today    NO  Reviewed patient's current orders and MAR    YES  PMH/SH reviewed - no change compared to H&P  ________________________________________________________________________  Care Plan discussed with:    Comments   Patient x    Family      RN x    Care Manager     Consultant                        Multidiciplinary team rounds were held today with , nursing, pharmacist and clinical coordinator. Patient's plan of care was discussed; medications were reviewed and discharge planning was addressed. ________________________________________________________________________  Total NON critical care TIME:  35   Minutes    Total CRITICAL CARE TIME Spent:   Minutes non procedure based      Comments   >50% of visit spent in counseling and coordination of care     ________________________________________________________________________  Chris Nino MD     Procedures: see electronic medical records for all procedures/Xrays and details which were not copied into this note but were reviewed prior to creation of Plan. LABS:  I reviewed today's most current labs and imaging studies.   Pertinent labs include:  Recent Labs     01/21/21 0414 01/20/21  0230 01/19/21  0325   WBC 4.1 5.5 4.2   HGB 12.4 11.1* 10.8*   HCT 39.7 34.8* 34.3*    230 226     Recent Labs     01/21/21 0414 01/20/21  0230 01/19/21  0325   * 130* 128*   K 3.7 4.1 5.8*   CL 93* 93* 93*   CO2 36* 35* 30   * 279* 446*   BUN 27* 32* 34*   CREA 1.33* 1.64* 1.83*   CA 9.0 8.5 8.0*   MG  --   --  2.0       Signed: Chris Nino MD

## 2021-01-21 NOTE — PROGRESS NOTES
Transition of Care Plan:  - Home   - Boyfriend to provide transportation home at d/c  - Outpatient follow up: PCP .  1/21 I asked CM specialist for PCP followup josé miguel't  - 2nd IMM Letter  -remind pt to have her transport bring in her portable oxygen   -therapy has no d/c recommendations at present time

## 2021-01-21 NOTE — PROGRESS NOTES
End of Shift Note    Bedside shift change report given to Abby Soulier (oncoming nurse) by Teresa Hurst (offgoing nurse). Report included the following information SBAR and Kardex    Shift worked:  Day     Shift summary and any significant changes:     tolerated treatment well. Gave appropriate doses of insulin via personal pump. Given PRN pain medication and anxiety meds. On baseline 3L NC      Concerns for physician to address:  NA     Zone phone for oncoming shift:          Activity:  Activity Level: Up with Assistance  Number times ambulated in hallways past shift: 0  Number of times OOB to chair past shift: 2    Cardiac:   Cardiac Monitoring: Yes      Cardiac Rhythm: Normal sinus rhythm    Access:   Current line(s): PIV     Genitourinary:   Urinary status: voiding    Respiratory:   O2 Device: Nasal cannula  Chronic home O2 use?: YES  Incentive spirometer at bedside: YES     GI:  Last Bowel Movement Date: 01/18/21  Current diet:  DIET CARDIAC Regular  Passing flatus: YES  Tolerating current diet: YES       Pain Management:   Patient states pain is manageable on current regimen: YES    Skin:  Joey Score: 20  Interventions: turn team and increase time out of bed    Patient Safety:  Fall Score:  Total Score: 2  Interventions: bed/chair alarm, assistive device (walker, cane, etc) and pt to call before getting OOB       Length of Stay:  Expected LOS: 4d 4h  Actual LOS: [address]

## 2021-01-21 NOTE — ROUTINE PROCESS
Attempted to schedule PCP LANE apt with Dr. Alida Payne, unable to receive answer at practice and left a voicemail requesting that nurse contact patient to schedule follow up appointment. [facility] scheduled for 1/23/21. [facility] and to [facility].   Apt added to AVS

## 2021-01-21 NOTE — PROGRESS NOTES
1900 Received report from [place], [facility]. Assumed care of patient.

## 2021-01-21 NOTE — PROGRESS NOTES
Physician Progress Note      Lou Verduzco  CSN #:                  [id number]  :                       1970  ADMIT DATE:       2021 1:15 PM  [address] [place] DATE:  Shelbi Zamora  PROVIDER #:        Cecil Martin MD          QUERY TEXT:    Dr Darryl Constantino:    Pt admitted with Community Acquired pneumonia, SOB, low oxygen saturation. Pt noted to have laboratory test positive for COVID 19 on . If possible, please document in progress notes and discharge summary if if you are evaluating or treating any of the following: The medical record reflects the following:  Risk Factors: 50yoF current smoker, COPD on home O2 prn, HTN , DMT2, CAD  Clinical Indicators: presented to PCP oxygen saturation was in low 70s; on home O2 as needed only for ambulation; c/o 'Loss of breath' ; dyspnea w exertion , +COVID test . reported on . O2 sat 93% on NC 4L up to 6L initially to maintainO2 sats 92-93%. Currently on RA. Treatment:  IV Rocephin, IV Zithromax, PPE, ABG, PPE, given Decadron on . Thank you,  Cornell Malagon, RN, [facility]    thank you  Options provided:  -- Acute lower respiratory infection due to COVID-19  -- Pneumonia due to COVID-19  -- Other - I will add my own diagnosis  -- Disagree - Not applicable / Not valid  -- Disagree - Clinically unable to determine / Unknown  -- Refer to Clinical Documentation Reviewer    PROVIDER RESPONSE TEXT:    This patient has an acute lower respiratory infection due to COVID-19. Query created by: Jaun Severe on 2021 12:00 PM      QUERY TEXT:    Dr Darryl Constantino:    Pt admitted with Community Acquired pneumonia. Pt noted to have laboratory test positive for COVID 19. If possible, please document in progress notes and discharge summary if COVID 19  was present on admission (POA):     The medical record reflects the following:  Risk Factors: 50yoF w/ COPD on home O2 use prn, CHF, HTN & continued tobacco abuse  Clinical Indicators: dyspnea w/ exertion, low O2 sats 70s at PCP, 'loss of breath'  H&P - Initially patient was on 6 L nasal cannula with saturation around 92-93, slowly improved, currently on 3 L nasal cannula  CXR result  questionable airspace disease behind the heart. 1/18 COVID test result + on 1/19  Treatment: IV Zithromax, IV Rocephin, supplemental O2 above baseline, IV decadron given in ED, ABG. Thank you,  Jourdan Huffman, RN, [place] [id number]  Options provided:  -- Yes, COVID-19 was present at the time of the order to admit to the hospital  -- No, COVID-19 was not present on admission and developed during the inpatient stay  -- Other - I will add my own diagnosis  -- Disagree - Not applicable / Not valid  -- Disagree - Clinically unable to determine / Unknown  -- Refer to Clinical Documentation Reviewer    PROVIDER RESPONSE TEXT:    COVID-19 was present at the time of inpatient admission order.     Query created by: Everardo Lafleur on 1/20/2021 12:09 PM      Electronically signed by:  Bert Vincent MD 1/21/2021 1:41 PM

## 2021-01-21 NOTE — PROGRESS NOTES
[address] INTERDISCIPLINARY ROUNDS    Cardiopulmonary Care Interdisciplinary Rounds were held today to discuss patient's plan of care and outcomes. The following members were present: NP/Physician, Pharmacy, Nursing and Case Management. PLAN OF CARE:   Continue current treatment plan, pt remain on 4L NC. Primary RN informed that we would need order in place for pt to continue to use her home insulin pump. Deven Malik, to f/u with Dr. Susan Mcmahan.      Expected Length of Stay:  4d 4h

## 2021-01-22 VITALS
DIASTOLIC BLOOD PRESSURE: 73 MMHG | TEMPERATURE: 97.9 F | HEART RATE: 78 BPM | HEIGHT: 65 IN | WEIGHT: 237.88 LBS | RESPIRATION RATE: 20 BRPM | OXYGEN SATURATION: 93 % | SYSTOLIC BLOOD PRESSURE: 130 MMHG | BODY MASS INDEX: 39.63 KG/M2

## 2021-01-22 LAB
ANION GAP SERPL CALC-SCNC: 4 MMOL/L (ref 5–15)
BUN SERPL-MCNC: 20 MG/DL (ref 6–20)
BUN/CREAT SERPL: 18 (ref 12–20)
CALCIUM SERPL-MCNC: 8.3 MG/DL (ref 8.5–10.1)
CHLORIDE SERPL-SCNC: 94 MMOL/L (ref 97–108)
CO2 SERPL-SCNC: 33 MMOL/L (ref 21–32)
CREAT SERPL-MCNC: 1.1 MG/DL (ref 0.55–1.02)
ERYTHROCYTE [DISTWIDTH] IN BLOOD BY AUTOMATED COUNT: 16.3 % (ref 11.5–14.5)
GLUCOSE BLD STRIP.AUTO-MCNC: 124 MG/DL (ref 65–100)
GLUCOSE BLD STRIP.AUTO-MCNC: 159 MG/DL (ref 65–100)
GLUCOSE SERPL-MCNC: 121 MG/DL (ref 65–100)
HCT VFR BLD AUTO: 34.8 % (ref 35–47)
HGB BLD-MCNC: 11 G/DL (ref 11.5–16)
MCH RBC QN AUTO: 27.6 PG (ref 26–34)
MCHC RBC AUTO-ENTMCNC: 31.6 G/DL (ref 30–36.5)
MCV RBC AUTO: 87.4 FL (ref 80–99)
NRBC # BLD: 0 K/UL (ref 0–0.01)
NRBC BLD-RTO: 0 PER 100 WBC
PLATELET # BLD AUTO: 205 K/UL (ref 150–400)
PMV BLD AUTO: 9.8 FL (ref 8.9–12.9)
POTASSIUM SERPL-SCNC: 3.6 MMOL/L (ref 3.5–5.1)
RBC # BLD AUTO: 3.98 M/UL (ref 3.8–5.2)
SERVICE CMNT-IMP: ABNORMAL
SERVICE CMNT-IMP: ABNORMAL
SODIUM SERPL-SCNC: 131 MMOL/L (ref 136–145)
WBC # BLD AUTO: 3.1 K/UL (ref 3.6–11)

## 2021-01-22 PROCEDURE — 36415 COLL VENOUS BLD VENIPUNCTURE: CPT

## 2021-01-22 PROCEDURE — 74011250637 HC RX REV CODE- 250/637: Performed by: INTERNAL MEDICINE

## 2021-01-22 PROCEDURE — 74011250636 HC RX REV CODE- 250/636: Performed by: STUDENT IN AN ORGANIZED HEALTH CARE EDUCATION/TRAINING PROGRAM

## 2021-01-22 PROCEDURE — 85027 COMPLETE CBC AUTOMATED: CPT

## 2021-01-22 PROCEDURE — 80048 BASIC METABOLIC PNL TOTAL CA: CPT

## 2021-01-22 PROCEDURE — 82962 GLUCOSE BLOOD TEST: CPT

## 2021-01-22 PROCEDURE — 77010033678 HC OXYGEN DAILY

## 2021-01-22 PROCEDURE — 74011250636 HC RX REV CODE- 250/636: Performed by: INTERNAL MEDICINE

## 2021-01-22 PROCEDURE — 74011250637 HC RX REV CODE- 250/637: Performed by: STUDENT IN AN ORGANIZED HEALTH CARE EDUCATION/TRAINING PROGRAM

## 2021-01-22 PROCEDURE — 94640 AIRWAY INHALATION TREATMENT: CPT

## 2021-01-22 PROCEDURE — 74011636637 HC RX REV CODE- 636/637: Performed by: INTERNAL MEDICINE

## 2021-01-22 RX ORDER — BUMETANIDE 1 MG/1
1 TABLET ORAL 2 TIMES DAILY
Status: DISCONTINUED | OUTPATIENT
Start: 2021-01-22 | End: 2021-01-22 | Stop reason: HOSPADM

## 2021-01-22 RX ORDER — ZINC SULFATE 50(220)MG
220 CAPSULE ORAL DAILY
Qty: 7 CAP | Refills: 0 | Status: SHIPPED | OUTPATIENT
Start: 2021-01-22 | End: 2021-07-28

## 2021-01-22 RX ORDER — INSULIN ASPART 100 [IU]/ML
INJECTION, SOLUTION INTRAVENOUS; SUBCUTANEOUS
Qty: 30 ML | Refills: 11 | Status: CANCELLED | OUTPATIENT
Start: 2021-01-22

## 2021-01-22 RX ORDER — DEXAMETHASONE 6 MG/1
6 TABLET ORAL
Qty: 6 TAB | Refills: 0 | Status: SHIPPED | OUTPATIENT
Start: 2021-01-22 | End: 2021-02-03

## 2021-01-22 RX ORDER — LANOLIN ALCOHOL/MO/W.PET/CERES
250 CREAM (GRAM) TOPICAL 2 TIMES DAILY
Qty: 14 TAB | Refills: 0 | Status: SHIPPED | OUTPATIENT
Start: 2021-01-22 | End: 2021-07-28

## 2021-01-22 RX ADMIN — ALBUTEROL SULFATE 2 PUFF: 90 AEROSOL, METERED RESPIRATORY (INHALATION) at 13:58

## 2021-01-22 RX ADMIN — DEXAMETHASONE 6 MG: 4 TABLET ORAL at 10:12

## 2021-01-22 RX ADMIN — CLOPIDOGREL BISULFATE 75 MG: 75 TABLET ORAL at 10:12

## 2021-01-22 RX ADMIN — HYDROCODONE BITARTRATE AND ACETAMINOPHEN 1 TABLET: 7.5; 325 TABLET ORAL at 01:47

## 2021-01-22 RX ADMIN — ALBUTEROL SULFATE 2 PUFF: 90 AEROSOL, METERED RESPIRATORY (INHALATION) at 07:40

## 2021-01-22 RX ADMIN — GABAPENTIN 300 MG: 300 CAPSULE ORAL at 10:12

## 2021-01-22 RX ADMIN — PANTOPRAZOLE SODIUM 40 MG: 40 TABLET, DELAYED RELEASE ORAL at 10:12

## 2021-01-22 RX ADMIN — HUMAN INSULIN 30 UNITS: 100 INJECTION, SUSPENSION SUBCUTANEOUS at 10:15

## 2021-01-22 RX ADMIN — CITALOPRAM HYDROBROMIDE 40 MG: 20 TABLET ORAL at 10:12

## 2021-01-22 RX ADMIN — DICLOFENAC SODIUM 4 G: 10 GEL TOPICAL at 01:47

## 2021-01-22 RX ADMIN — CARVEDILOL 6.25 MG: 6.25 TABLET, FILM COATED ORAL at 10:12

## 2021-01-22 RX ADMIN — ENOXAPARIN SODIUM 40 MG: 40 INJECTION SUBCUTANEOUS at 10:15

## 2021-01-22 RX ADMIN — BUMETANIDE 1 MG: 1 TABLET ORAL at 10:12

## 2021-01-22 RX ADMIN — HYDROCODONE BITARTRATE AND ACETAMINOPHEN 1 TABLET: 7.5; 325 TABLET ORAL at 10:12

## 2021-01-22 NOTE — PROGRESS NOTES
LANE: Home with Follow-up Appointment    11:04am-No further CM needs identified. CM notified pt's nurse of d/c.    11:00am- CM called pt's room via phone due to pt being on respiratory unit to discuss d/c plan. Pt stated that her father will transport at d/c. Pt stated that she did not need her portable tank at d/c. Care Management Interventions  PCP Verified by CM: Yes  Palliative Care Criteria Met (RRAT>21 & CHF Dx)?: No  Mode of Transport at Discharge: Other (see comment)(Pt's father will transport at d/c. )  Transition of Care Consult (CM Consult): Other(Home with Follow-up Apppointments)  Discharge Durable Medical Equipment: No  Physical Therapy Consult: Yes  Occupational Therapy Consult: No  Speech Therapy Consult: No  Current Support Network:  Other  Confirm Follow Up Transport: [address] Provided?: No  Discharge Location  Discharge Placement: Home(Home with Follow-up Apppointments)    Jaspreet Kerr [address]  [phone]

## 2021-01-22 NOTE — PROGRESS NOTES
DISCHARGE SUMMARY FROM [address] Miguel NURSE    The patient is stable for discharge. I have reviewed the discharge instructions with the patient. The patient verbalized understanding. All questions were fully answered. The patient verbalized no complaints. Hard scripts and medication handouts were given and reviewed with the patient. Appropriate educational materials and medication side effects teaching were provided also provided. Cardiac monitor and IV line(s) were removed. The following personal items collected during admission were returned to the patient/family  Home medications: None  Dental Appliance:    Vision: Visual Aid: Glasses, With patient  Hearing Aid:    Jewelry:    Clothing:    Other Valuables:    Valuables sent to safe:      OR _________________________________________________________________________________________    There were no personal belongings, valuables or home medications left at patient's bedside,  or safe.

## 2021-01-22 NOTE — PROGRESS NOTES
0730 Bedside shift change report given to [address] Jim[address] (oncoming nurse) by Parisa Rogers RN (offgoing nurse). Report included the following information SBAR.

## 2021-01-22 NOTE — PROGRESS NOTES
End of Shift Note    Bedside shift change report given to Mouna Woo RN (oncoming nurse) by Ruthie Ignacio RN (offgoing nurse). Report included the following information SBAR, Kardex, ED Summary, MAR, Recent Results and Cardiac Rhythm NSR    Shift worked:  [id number]     Shift summary and any significant changes:     Patient was transferred from Room 2219 to 2210 during the night. She ambulated in the hallway between rooms with me as a standby. Tolerated well. Continues to be on 4L NC. Concerns for physician to address: none     Zone phone for oncoming shift:          Activity:  Activity Level: Up with Assistance  Number times ambulated in hallways past shift: 1  Number of times OOB to chair past shift: 2    Cardiac:   Cardiac Monitoring: Yes      Cardiac Rhythm: Normal sinus rhythm    Access:   Current line(s): PIV     Genitourinary:   Urinary status: voiding    Respiratory:   O2 Device: Nasal cannula  Chronic home O2 use?: YES  Incentive spirometer at bedside: NO     GI:  Last Bowel Movement Date: 01/18/21  Current diet:  DIET DIABETIC WITH OPTIONS Consistent Carb 2200kcal; Regular  Passing flatus: YES  Tolerating current diet: YES  % Diet Eaten: 75 %    Pain Management:   Patient states pain is manageable on current regimen: YES    Skin:  Joey Score: 20  Interventions: increase time out of bed    Patient Safety:  Fall Score:  Total Score: 2  Interventions: bed/chair alarm, gripper socks, pt to call before getting OOB and stay with me (per policy)       Length of Stay:  Expected LOS: 5d 12h  Actual LOS: [address]

## 2021-01-22 NOTE — TELEPHONE ENCOUNTER
----- Message from Rafat Hamilton sent at 1/22/2021  4:20 PM EST -----  Regarding: Dr. Darci Melendez: [phone]  Medication Refill    Caller (if not patient):Pt      Relationship of caller (if not patient): n/a      Best contact number(s):[phone]      Name of medication and dosage if known: \"novolog long acting insulin\"      Is patient out of this medication (yes/no):yes      Pharmacy name:[facility]  Pharmacy listed in chart? (yes/no):yes  Pharmacy phone [phone]      Details to clarify the request: Patient was in [facility] for [place] [facility] and discharged on 1/22/21.          Rafat Hamilton

## 2021-01-22 NOTE — TELEPHONE ENCOUNTER
Called and spoke with pt. Clarified that she was asking for NPH as the \"long acting insulin\" that I had given her in the hospital to cover the hyperglycemic effect of the dexamethasone that she is on for COVID. I sent a vial of NPH to [facility] to dispense either humulin or novolin, whichever is covered and to take 30 units before breakfast and 10 units at bedtime for the next 6 days while finishing off the 6 days of dexamethasone. She should continue to run her current pump settings. she voiced understanding of this plan.

## 2021-01-22 NOTE — PROGRESS NOTES
Pulse oximetry assessment   87% at rest on room air (if 88% or less, skip next steps)  94% at rest on 2 LPM  87% while ambulating on 3 LPM    93% while ambulating on 4 LPM

## 2021-01-22 NOTE — PROGRESS NOTES
0700  Received report from Ge Haywood Bulb. SBAR, Kardex, and MAR were discussed. End of Shift Note    Bedside shift change report given to Rose Mcfarlane (oncoming nurse) by Carroll Kaufman RN (offgoing nurse). Report included the following information SBAR, Kardex, ED Summary, Intake/Output, MAR, Recent Results and Cardiac Rhythm NSR    Shift worked:  [id number]     Shift summary and any significant changes:    Patient administered insulin via insulin pump after sugars were checked by RN/Tech. Administered PRN pain meds and anxiety meds as needed. (Patient requested to be on 3.5L, not 3.)   Concerns for physician to address:        Zone phone for oncoming shift:           Activity:  Activity Level: Up with Assistance  Number times ambulated in hallways past shift: 0  Number of times OOB to chair past shift: 3    Cardiac:   Cardiac Monitoring: Yes      Cardiac Rhythm: Normal sinus rhythm    Access:   Current line(s): PIV     Genitourinary:   Urinary status: voiding    Respiratory:   O2 Device: Nasal cannula  Chronic home O2 use?: YES  Incentive spirometer at bedside: YES     GI:  Last Bowel Movement Date: 01/18/21  Current diet:  DIET DIABETIC WITH OPTIONS Consistent Carb 2200kcal; Regular  Passing flatus: YES  Tolerating current diet: YES  % Diet Eaten: 75 %    Pain Management:   Patient states pain is manageable on current regimen: YES    Skin:  Joey Score: 20  Interventions: increase time out of bed    Patient Safety:  Fall Score:  Total Score: 2  Interventions: bed/chair alarm, gripper socks, pt to call before getting OOB and stay with me (per policy)       Length of Stay:  Expected LOS: 5d 12h  Actual LOS: 9 Kristi Britton RN

## 2021-01-22 NOTE — PROGRESS NOTES
Endocrinology Progress Note    Reviewed blood sugars from yesterday [facility] Component       GLUCOSE,FAST - POC   Latest Ref Rng & Units       65 - 100 mg/dL   1/21/2021      9:02  (H)   1/21/2021      4:32  (H)   1/21/2021      12:16  (H)   1/21/2021      7:26  (H)   1/21/2021      5:29  (H)   1/21/2021      12:56  (H)     Her blood sugars were much better controlled yesterday after addition of NPH to cover her dexamethasone. Will not make any changes to her regimen at this time. Please don't hesitate to send me a message through perfect serve with any questions or concerns.     David Loo MD

## 2021-01-22 NOTE — DISCHARGE SUMMARY
Hospitalist Discharge Summary     Patient ID:  Olayinka Willson  [id number]  53 y.o.  1970  1/18/2021    PCP on record: Karoline Ellington MD    Admit date: 1/18/2021  Discharge date and time: 1/22/2021    DISCHARGE DIAGNOSIS:  Acute on chronic hypoxic respiratory failure  Due to diastolic congestive heart failure POA  COVID-19 infection  community-acquired pneumonia  Right lower extremity pain and swelling  Hypervolemic hyponatremia  hypertension  Anxiety disorder  Diabetic neuropathy  CAD  Insomnia  Diabetes type 2 hyperglycemia  Arthritis of the knees on narcotic treatment  Tobacco abuse    CONSULTATIONS:  None    Excerpted HPI from H&P of Edwige Cheng MD:  Jose Irizarry is a 53 y.o.  female who presents with loss of breath and low oxygen saturation for 1 day. Patient past medical history of hypertension, diabetes type 2, osteoarthritis of the knee, diastolic CHF, varicose veins, anxiety disorder, CAD, COPD. States that when today she went to see her primary care doctor for the knee pain her oxygen saturation was in low 70s and she was sent to the ER for evaluation. Patient did report of some shortness of breath but not worse. Patient is supposed to use oxygen at home-3 L but she seldom uses it specially only during ambulation. Patient also stopped taking Symbicort because according to her she does not need after the heart surgery that was done and August 2020. Patient is not compliant with the medications, still smokes 1 and half pack a day. Denies any chest pain, no fever and chills, no history of sick contacts, no nausea or vomiting, no abdominal pain, no dizziness, no other complaints.     We were asked to admit for work up and evaluation of the above problems.        ______________________________________________________________________  DISCHARGE SUMMARY/HOSPITAL COURSE:  for full details see H&P, daily progress notes, labs, consult notes.        Acute on chronic hypoxic respiratory failure  Due to diastolic congestive heart failure POA  COVID-19 infection  community-acquired pneumonia  Right lower extremity pain and swelling  Hypervolemic hyponatremia  -WBC 5.2, procalcitonin level is less than 0.05  -Initially patient was on 6 L nasal cannula with saturation around 92-93, slowly improved, currently on 4 L   Patient use home oxygen as needed specially on ambulation.  -Chest x-ray shows lower lobe airspace disease  -s/p Rocephin and azithromycin for 3 days and discontinued as procalcitonin levels are low. -Airspace disease noticed on chest x-ray is likely due to congestive heart failure. -Rapid Covid negative, PCR is positive.  -Albuterol every 6 hours.  -Continue dexamethasone for 10 days total  -s/p Bumex to 1 mg IV twice daily , transition to oral as patient is euvolemic now  -I's and O's  -Daily weight  -Recent echocardiogram in August showed EF of 55 to 60%   -Right lower extremity Doppler did not show any evidence of deep vein thrombosis  -Swelling is likely due to edema and fluid retention  -Renal function while being diuresed  -Sodium level improved to 131. Patient is still euvolemic now  -Diuresis as above, monitor BMP   -Was not a candidate for remdesivir due to renal function and EGFR <30 ON ADMISSION     hypertension  Anxiety disorder  Diabetic neuropathy  CAD  Insomnia  -Continue the home medications.     Diabetes type 2 hyperglycemia  Patient has insulin pump. HbA1c 1 week ago was 9.6.     Arthritis of the knees on narcotic treatment  -Continue fentanyl patch, diclofenac gel for the knee.     Tobacco abuse  Smokes 1 and half pack a day, on nicotine patch.         _______________________________________________________________________  Patient seen and examined by me on discharge day. Pertinent Findings:  Gen:    Not in distress  Chest: Clear lungs  CVS:   Regular rhythm.   No edema  Abd:  Soft, not distended, not tender  Neuro:  Alert, ORIENTED X3  _______________________________________________________________________  DISCHARGE MEDICATIONS:   Current Discharge Medication List      START taking these medications    Details   dexAMETHasone (DECADRON) 6 mg tablet Take 1 Tab by mouth Daily (before breakfast). Qty: 6 Tab, Refills: 0      ascorbic acid, vitamin C, (Vitamin C) 250 mg chew Take 250 mg by mouth two (2) times a day. Qty: 14 Tab, Refills: 0      zinc sulfate (Zinc-220) 220 (50) mg capsule Take 1 Cap by mouth daily. Qty: 7 Cap, Refills: 0         CONTINUE these medications which have NOT CHANGED    Details   gabapentin (NEURONTIN) 300 mg capsule Take 900 mg by mouth three (3) times daily. umeclidinium-vilanteroL (Anoro Ellipta) 62.5-25 mcg/actuation inhaler Take 1 Puff by inhalation daily. HYDROcodone-acetaminophen (NORCO) 7.5-325 mg per tablet Take  by mouth every six (6) hours as needed for Pain. 1-2 tabs every 6 hours as needed for pain      ARIPiprazole (Abilify) 30 mg tablet Take 30 mg by mouth daily. brexpiprazole (Rexulti) 0.5 mg tab tablet Take 1 mg by mouth daily. busPIRone (BUSPAR) 10 mg tablet Take 20 mg by mouth two (2) times a day. fentaNYL (DURAGESIC) 25 mcg/hr PATCH 1 Patch by TransDERmal route every seventy-two (72) hours. ALPRAZolam (XANAX) 1 mg tablet Take 1 mg by mouth three (3) times daily as needed. insulin aspart U-100 (NovoLOG U-100 Insulin aspart) 100 unit/mL injection USE AS DIRECTED FOR INSULIN PUMP.  UNITS PER DAY. DX E11.65  Qty: 30 mL, Refills: 11      promethazine (PHENERGAN) 25 mg tablet TAKE ONE TABLET BY MOUTH EVERY 6 HOURS AS NEEDED FOR NAUSEA  Qty: 30 Tab, Refills: 2      fluticasone propionate (FLONASE) 50 mcg/actuation nasal spray 2 Sprays by Both Nostrils route daily as needed for Rhinitis. Qty: 16 g, Refills: 3      clopidogreL (Plavix) 75 mg tab Take 1 Tab by mouth daily. bumetanide (BUMEX) 1 mg tablet Take 1 Tab by mouth two (2) times a day.       glucagon (Glucagon Emergency Kit, human,) 1 mg injection USE AS DIRECTED  Qty: 2 Vial, Refills: 11      carvedilol (COREG) 6.25 mg tablet Take 1 Tab by mouth daily. Delete the 12.5 mg dose from profile  Qty: 90 Tab, Refills: 3      atorvastatin (LIPITOR) 40 mg tablet TAKE ONE TABLET BY MOUTH DAILY  Qty: 90 Tab, Refills: 3      esomeprazole (NEXIUM) 40 mg capsule TAKE ONE CAPSULE BY MOUTH DAILY  Qty: 30 Cap, Refills: 11      insulin pump (PATIENT SUPPLIED) misc by SubCUTAneous route as needed. Insulin Type: Novolog  Basal Dose:  midnight-midnight: 3.4 units/hr    Target B- mg/dL  Corrective Dose: 1 unit per 50 mg/dL over 150 mg/dL  Insulin: Carb ratio: 1 units: 15 grams  Last time insulin pump supplies changed: 21  Last time insulin pump refilled: 21      citalopram (CELEXA) 20 mg tablet Take 40 mg by mouth daily. traZODone (DESYREL) 50 mg tablet Take 100 mg by mouth nightly. levonorgestrel (MIRENA) 20 mcg/24 hr (5 years) IUD 1 Device by IntraUTERine route once. fentaNYL (DURAGESIC) 100 mcg/hr PATCH 1 Patch by TransDERmal route every seventy-two (72) hours. Patient Follow Up Instructions: Activity: Activity as tolerated  Diet: Diabetic Diet  Wound Care: None needed    Follow-up with pcp in one week  Follow-up tests/labs none  Follow-up Information     Follow up With Specialties Details Why Contact Info    Arabella Spears MD Family Medicine  The nurse will contact you with appointment date and time  [address] [facility]  [place] [address] [phone]      [facility], In-Home Clinical Assessments On 2021 [facility] will contact you with appointment date and time  [facility] [address] Shashank Farias MD Internal Medicine In 1 week  [id number] Dept of Emergency Medicine  [facility] [id number]  [phone] ________________________________________________________________    Risk of deterioration: High    Condition at Discharge:  Stable  __________________________________________________________________    Disposition  Home with family, no needs    ____________________________________________________________________    Code Status: Full Code  ___________________________________________________________________      Total time in minutes spent coordinating this discharge (includes going over instructions, follow-up, prescriptions, and preparing report for sign off to her PCP) :  45 minutes    Signed:  Liana Molina MD

## 2021-01-22 NOTE — DISCHARGE INSTRUCTIONS
HOSPITALIST DISCHARGE INSTRUCTIONS    NAME: Murtaza Humphries   :  1970   MRN:  [id number]     Date/Time:  2021 10:34 AM    ADMIT DATE: 2021     DISCHARGE DATE: 2021     DISCHARGE DIAGNOSIS:  Covid 19 Infection  Acute on chronic Diastolic CHF    MEDICATIONS:  · It is important that you take the medication exactly as they are prescribed. · Keep your medication in the bottles provided by the pharmacist and keep a list of the medication names, dosages, and times to be taken in your wallet. · Do not take other medications without consulting your doctor. Pain Management: per above medications    What to do at Home: Home isolation for 2 weeks    Recommended diet:  Cardiac Diet    Recommended activity: Activity as tolerated    If you have questions regarding the hospital related prescriptions or hospital related issues please call [facility] tommie Smiley at [phone]. If you experience any of the following symptoms then please call your primary care physician or return to the emergency room if you cannot get hold of your doctor:  Fever, chills, nausea, vomiting, diarrhea, change in mentation, falling, bleeding, shortness of breath,    Follow Up:  Dr. Jeremy Felipe MD  you are to call and set up an appointment to see them in 7-10 days. Information obtained by :  I understand that if any problems occur once I am at home I am to contact my physician. I understand and acknowledge receipt of the instructions indicated above.                                                                                                                                            Physician's or R.N.'s Signature                                                                  Date/Time                                                                                                                                              Patient or Representative Signature Date/Time

## 2021-01-23 ENCOUNTER — TELEPHONE (OUTPATIENT)
Dept: ENDOCRINOLOGY | Age: 51
End: 2021-01-23

## 2021-01-23 LAB
BACTERIA SPEC CULT: NORMAL
SERVICE CMNT-IMP: NORMAL

## 2021-01-23 NOTE — TELEPHONE ENCOUNTER
Received page from Sd Miller this AM that [facility] on [address] never received the NPH rx William Cantuor put in yesterday. It was sent as normal.  Sent in another rx. Called pharmacy to confirm they received it- they did receive it yesterday but were not able to see the \"dispense novolin or humulin portion\" and did not fill- I verbally asked pharmacist to fill either and he stated \"we'll take care of it\".     Jemal Lund, [place] [facility]

## 2021-01-25 ENCOUNTER — PATIENT OUTREACH (OUTPATIENT)
Dept: CASE MANAGEMENT | Age: 51
End: 2021-01-25

## 2021-01-25 NOTE — PROGRESS NOTES
Patient was admitted to [facility] on 1-18-21 and discharged on 1-22-21 for:    DISCHARGE DIAGNOSIS:  Acute on chronic hypoxic respiratory failure  Due to diastolic congestive heart failure POA  COVID-19 infection  community-acquired pneumonia  Right lower extremity pain and swelling  Hypervolemic hyponatremia  hypertension  Anxiety disorder  Diabetic neuropathy  CAD  Insomnia  Diabetes - hyperglycemia  Arthritis of the knees on narcotic treatment  Tobacco abuse      Outreach made within 2 business days of discharge: Yes    Discharge Challenges to be reviewed by the provider:   Additional needs identified to be addressed with provider:  no  -  Patient denies fever/chills and denies nausea/vomiting since discharge on 1-22-21. Patient complains of ongoing fatigue and states the fatigue is improving. Patient states she is utilizing home oxygen at 3lpm, as ordered. Patient states she will remain on home quarantine through 2-5-21 and states, \"I'm doing alright, feeling better. \"   - Patient states she is utilizing a diabetic diet at home and states her appetite is poor at this time, states poor appetite is due to \"Not feeling well. \"   - Patient states she smoked her last cigarette on 1-18-21.   - Patient reports she has not had any falls in the last 12 months. Discussed COVID-19 related testing which was available at this time. Test results were positive. Patient informed of results, if available? yes   Method of communication with provider : none. Patient has no red flags to report at this time and PCP/Dr. Jose Damon is a [place] [address] provider.         Component      Latest Ref Rng & Units 1/22/2021 1/22/2021 1/21/2021 1/21/2021          12:14 PM  9:45 AM  9:02 PM  4:32 PM   GLUCOSE,FAST - POC      65 - 100 mg/dL 159 (H) 124 (H) 214 (H) 140 (H)     Component      Latest Ref Rng & Units 1/21/2021 1/21/2021          12:16 PM  7:26 AM   GLUCOSE,FAST - POC      65 - 100 mg/dL 195 (H) 207 (H)     Component      Latest Ref Rng & Units 2021           1:53 AM  4:14 AM  2:30 AM   Sodium      136 - 145 mmol/L 131 (L) 132 (L) 130 (L)     Component      Latest Ref Rng & Units 2021           1:53 AM  4:14 AM  2:30 AM   Chloride      97 - 108 mmol/L 94 (L) 93 (L) 93 (L)   CO2      21 - 32 mmol/L 33 (H) 36 (H) 35 (H)   Anion gap      5 - 15 mmol/L 4 (L) 3 (L) 2 (L)     Component      Latest Ref Rng & Units 2021           1:53 AM  4:14 AM  2:30 AM   BUN      6 - 20 MG/DL 20 27 (H) 32 (H)   Creatinine      0.55 - 1.02 MG/DL 1.10 (H) 1.33 (H) 1.64 (H)   BUN/Creatinine ratio      12 - 20   18 20 20   GFR est AA      >60 ml/min/1.73m2 >60 51 (L) 40 (L)   GFR est non-AA      >60 ml/min/1.73m2 53 (L) 42 (L) 33 (L)   Calcium      8.5 - 10.1 MG/DL 8.3 (L) 9.0 8.5     Component      Latest Ref Rng & Units 2021           1:53 AM  4:14 AM  2:30 AM   WBC      3.6 - 11.0 K/uL 3.1 (L) 4.1 5.5     Component      Latest Ref Rng & Units 2021           1:53 AM  4:14 AM  2:30 AM   HGB      11.5 - 16.0 g/dL 11.0 (L) 12.4 11.1 (L)   HCT      35.0 - 47.0 % 34.8 (L) 39.7 34.8 (L)     Component      Latest Ref Rng & Units 2021           1:53 AM  4:14 AM  2:30 AM   RDW      11.5 - 14.5 % 16.3 (H) 16.8 (H) 16.6 (H)     Advance Care Planning:   Does patient have an Advance Directive:  currently not on file; education provided     Was this a readmission? no   Patient stated reason for the admission: \"I had Covid. \"   Patients top risk factors for readmission: medical condition  Interventions to address risk factors: Education provided regarding COPD exacerbation, Covid-19, and pneumonia, patient verbalized an understanding. Care Transition Nurse (CTN) contacted the patient by telephone to perform post hospital discharge assessment.  Verified name and  with patient as identifiers. Provided introduction to self, and explanation of the CTN role. CTN reviewed discharge instructions, medical action plan and red flags with patient who verbalized understanding. Patient given an opportunity to ask questions and does not have any further questions or concerns at this time. The patient agrees to contact the PCP office for questions related to their healthcare. Medication reconciliation was performed with patient, who verbalizes understanding of administration of home medications. Advised obtaining a 90-day supply of all daily and as-needed medications. Referral to Pharm D needed: no     Home Health/Outpatient orders at discharge: [address]: n/a  Date of initial visit: [facility] ordered at discharge: none  [facility]  received: n/a    Covid Risk Education    Patient has following risk factors of: COPD, pneumonia, acute respiratory failure, diabetes, chronic kidney disease and HF and CAD per chart. Education provided regarding infection prevention, and signs and symptoms of COVID-19 and when to seek medical attention with patient who verbalized understanding. Discussed exposure protocols and quarantine From CDC: Are you at higher risk for severe illness?  and given an opportunity for questions and concerns. The patient agrees to contact the COVID-19 hotline [phone] or PCP office for questions related to COVID-19. For more information on steps you can take to protect yourself, see CDC's How to Protect Yourself     Patient/family/caregiver given information for Sheila Lundy and agrees to enroll no  Patient's preferred e-mail: declines  Patient's preferred phone number: declines    Discussed follow-up appointments.  If no appointment was previously scheduled, appointment scheduling offered: yes  [facility]):   Future Appointments   Date Time Provider Department Center   4/20/2021  9:30 AM Austin Culver MD R[facility]     Non-BS follow up appointment(s): Please see below for appointments. Plan for follow-up call in 10-14 days based on severity of symptoms and risk factors. CTN provided contact information for future needs. Goals Addressed                 This Visit's Progress     Attends follow-up appointments as directed. 1-25-21: Patient states she will call the office of Dr. Carey Galeas/PCP with [facility] this afternoon to schedule a virtual LANE appointment. Patient states she has virtual pulmonology follow-up appointment scheduled with Dr. Terry Aguilar on 1-26-21, and cardio follow-up appointment scheduled with a [facility] provider (patient is unable to provide physician's name) in February of 2021 (patient is unable to provide exact date of appointment). Patient states [facility] did not visit her on 1-23-21, as stated on her discharge instructions, and patient declines [facility] update at this time. Patient reports he significant other is currently providing transportation. Deepti Sanderson Understands red flags post discharge. 1-25-21: Red flags of COPD exacerbation, Covid-19, and pneumonia reviewed with patient, patient verbalized an understanding. Patient denies fever/chills and denies nausea/vomiting since discharge on 1-22-21. Patient complains of ongoing fatigue and states the fatigue is improving. Patient states she is utilizing home oxygen at 3lpm, as ordered. Patient states she will remain on home quarantine through 2-5-21 and states, \"I'm doing alright, feeling better. \" Care Transitions Nurse will review red flags again on next phone conversation with patient.  Alisha Oh

## 2021-02-03 ENCOUNTER — TELEPHONE (OUTPATIENT)
Dept: ENDOCRINOLOGY | Age: 51
End: 2021-02-03

## 2021-02-03 NOTE — TELEPHONE ENCOUNTER
Patient wants to speak with Dr. Bhavna Kolb regarding her sugar. She stated her sugar continues to stay over 400 and currently after giving her self and extra 25 units of insulin at 12 noon and at 2:30 pm her sugar is currently 410.   Please call at [phone]

## 2021-02-03 NOTE — TELEPHONE ENCOUNTER
Called and spoke with pt at 4:10pm.  She states she finished off the course of dexamethasone and NPH that I gave her to cover the steroids last week and was feeling fine off the steroids but over the past 2-3 days had had an increase in headaches and body aches. She has not had any fever, nausea/vomiting/diarrhea or dysuria to suggest underlying infection. She has changed her site 3 times today and gave a manual injection of novolog of 25 units with a syringe. I asked if she still had some of the NPH and she does and advised her to take a dose of 20 units now and to drink plenty of water. I told her to check her sugar in 3 hours at 7pm and if her sugar is coming under 300 to just continue her current pump settings but if still over 300, to take an additional 10 units of NPH at that time and to give me an update tomorrow through [employer]. she voiced understanding of this plan.

## 2021-02-13 RX ORDER — GABAPENTIN 300 MG/1
CAPSULE ORAL
Qty: 180 CAP | Refills: 5 | Status: SHIPPED | OUTPATIENT
Start: 2021-02-13 | End: 2021-10-13

## 2021-02-27 RX ORDER — CARVEDILOL 6.25 MG/1
TABLET ORAL
Qty: 90 TAB | Refills: 2 | Status: SHIPPED | OUTPATIENT
Start: 2021-02-27 | End: 2021-10-06

## 2021-03-05 RX ORDER — FLASH GLUCOSE SENSOR
KIT MISCELLANEOUS
Qty: 2 KIT | Refills: 11 | Status: SHIPPED | OUTPATIENT
Start: 2021-03-05 | End: 2021-07-28

## 2021-03-23 ENCOUNTER — TELEPHONE (OUTPATIENT)
Dept: ENDOCRINOLOGY | Age: 51
End: 2021-03-23

## 2021-03-23 NOTE — TELEPHONE ENCOUNTER
Called and spoke with pt after she paged me that her insulin pump broke about an hour ago. She has already spoken to [employer] and they will overnight her pump to arrive tomorrow. She last took a bolus of 6 units about an hour ago before the pump broke. Her sugar is currently 257. I advised her to give herself 6 units of novolog every 4 hours to act as her basal and to dose 1:10 for carbs and 1:30 > 150 for correction until the pump arrives. she voiced understanding of this plan.

## 2021-03-26 ENCOUNTER — TELEPHONE (OUTPATIENT)
Dept: ENDOCRINOLOGY | Age: 51
End: 2021-03-26

## 2021-03-26 NOTE — TELEPHONE ENCOUNTER
Please call pt to let her know she has an unread message in [employer]:    I'm so glad your sugars were controlled overnight.  Were you able to reprogram your pump?      These were the settings I had based on your visit on 1/12/21:     Current settings are as follows:   - basal: 12a: 1.55, 4:30a: 1.65, 10p: 1.55   - Carb ratio: 12   - sensitivity: 20   - target: 12a: 130-150, 6a: 100-130, 10p: 130-150   - active insulin time: 3 hours

## 2021-04-06 DIAGNOSIS — R79.89 ELEVATED LFTS: ICD-10-CM

## 2021-04-06 DIAGNOSIS — E78.5 HYPERLIPIDEMIA LDL GOAL <100: ICD-10-CM

## 2021-04-06 DIAGNOSIS — R94.6 BORDERLINE ABNORMAL THYROID FUNCTION TEST: ICD-10-CM

## 2021-04-06 DIAGNOSIS — I10 ESSENTIAL HYPERTENSION: ICD-10-CM

## 2021-04-08 LAB
BUN SERPL-MCNC: 17 MG/DL (ref 6–24)
BUN/CREAT SERPL: 15 (ref 9–23)
CALCIUM SERPL-MCNC: 9.3 MG/DL (ref 8.7–10.2)
CHLORIDE SERPL-SCNC: 99 MMOL/L (ref 96–106)
CO2 SERPL-SCNC: 28 MMOL/L (ref 20–29)
CREAT SERPL-MCNC: 1.15 MG/DL (ref 0.57–1)
EST. AVERAGE GLUCOSE BLD GHB EST-MCNC: 229 MG/DL
GLUCOSE SERPL-MCNC: 112 MG/DL (ref 65–99)
HBA1C MFR BLD: 9.6 % (ref 4.8–5.6)
INTERPRETATION: NORMAL
Lab: NORMAL
POTASSIUM SERPL-SCNC: 4.5 MMOL/L (ref 3.5–5.2)
SODIUM SERPL-SCNC: 144 MMOL/L (ref 134–144)

## 2021-04-16 ENCOUNTER — TELEPHONE (OUTPATIENT)
Dept: ENDOCRINOLOGY | Age: 51
End: 2021-04-16

## 2021-04-16 NOTE — TELEPHONE ENCOUNTER
I called [facility] [address] and spoke with Lazarus Pitter regarding receiving a Prior auth request for Ms. Ivan Michelle. She stated that it was a Tier 3 and it was not on the formulary. She processed it to see if another pen would be recommended and it did not. She stated that a prior auth would need to be submitted. PA was initiated through covermymeds.

## 2021-04-17 NOTE — TELEPHONE ENCOUNTER
I submitted a prior authorization for glucagon through covermymeds and am currently waiting a response from the system.

## 2021-04-18 NOTE — TELEPHONE ENCOUNTER
Sent her the following message through [employer]    I submitted an authorization for the glucagon through our electronic system called covermymeds and received notification that this med has been approved so you should be able to get this from the local or mail pharmacy that normally dispenses this for you. Please let me know if you have any trouble getting this filled.

## 2021-04-20 ENCOUNTER — VIRTUAL VISIT (OUTPATIENT)
Dept: ENDOCRINOLOGY | Age: 51
End: 2021-04-20
Payer: MEDICARE

## 2021-04-20 DIAGNOSIS — E78.5 HYPERLIPIDEMIA LDL GOAL <100: ICD-10-CM

## 2021-04-20 DIAGNOSIS — I10 ESSENTIAL HYPERTENSION: ICD-10-CM

## 2021-04-20 DIAGNOSIS — R94.6 BORDERLINE ABNORMAL THYROID FUNCTION TEST: ICD-10-CM

## 2021-04-20 DIAGNOSIS — R79.89 ELEVATED LFTS: ICD-10-CM

## 2021-04-20 PROCEDURE — G9717 DOC PT DX DEP/BP F/U NT REQ: HCPCS | Performed by: INTERNAL MEDICINE

## 2021-04-20 PROCEDURE — G8427 DOCREV CUR MEDS BY ELIG CLIN: HCPCS | Performed by: INTERNAL MEDICINE

## 2021-04-20 PROCEDURE — G8756 NO BP MEASURE DOC: HCPCS | Performed by: INTERNAL MEDICINE

## 2021-04-20 PROCEDURE — 2022F DILAT RTA XM EVC RTNOPTHY: CPT | Performed by: INTERNAL MEDICINE

## 2021-04-20 PROCEDURE — 3017F COLORECTAL CA SCREEN DOC REV: CPT | Performed by: INTERNAL MEDICINE

## 2021-04-20 PROCEDURE — 3046F HEMOGLOBIN A1C LEVEL >9.0%: CPT | Performed by: INTERNAL MEDICINE

## 2021-04-20 PROCEDURE — 99214 OFFICE O/P EST MOD 30 MIN: CPT | Performed by: INTERNAL MEDICINE

## 2021-04-20 NOTE — PROGRESS NOTES
Chief Complaint   Patient presents with   Elenita Silver Diabetes     [phone] doxy    Other     pcp and pharmacy confirmed       **THIS IS A VIRTUAL VISIT VIA A VIDEO SYNCHRONOUS DISCUSSION. PATIENT AGREED TO HAVE THEIR CARE DELIVERED OVER A DOXY. ME VIDEO VISIT IN PLACE OF THEIR REGULARLY SCHEDULED OFFICE VISIT**    History of Present Illness: Susan Healy is a 49 y.o. female here for follow up of diabetes. Current settings are as follows:  - basal: 12a: 1.55, 4:30a: 1.65, 10p: 1.55  - Carb ratio: 12  - sensitivity: 20  - target: 12a: 130-150, 6a: 100-130, 10p: 130-150  - active insulin time: 3 hours    she has the following indications to continue treatment with freestyle qiana:  1) she has type 1 diabetes and is on an intensive insulin regimen with an insulin pump  2) she tests her blood sugar 8 times per day and makes treatment decisions off her blood sugar readings and her sensor readings  3) she requires adjustments to her insulin pump setttings based on her sensor readings  4) she has benefitted from therapeutic continuous glucose monitoring and I recommend that she continue this    She is still having lows after eating and didn't make any other changes to her settings and is still using the carb ratio of 12. Weight is currently around 220 up from 220 earlier this winter. Has had some pain in her knee and just had a silicone injection and this has helped her pain. Will be having 9 teeth removed next month and will have dentures on the top and bottom. Current Outpatient Medications   Medication Sig    OXYGEN-AIR DELIVERY SYSTEMS by Does Not Apply route.  FreeStyle Qiana 14 Day Sensor kit USE AS DIRECTED EVERY 14 DAYS    carvediloL (COREG) 6.25 mg tablet TAKE ONE TABLET BY MOUTH DAILY    gabapentin (NEURONTIN) 300 mg capsule TAKE TWO CAPSULES BY MOUTH THREE TIMES A DAY    ascorbic acid, vitamin C, (Vitamin C) 250 mg chew Take 250 mg by mouth two (2) times a day.     ARIPiprazole (Abilify) 30 mg tablet Take 30 mg by mouth daily.  brexpiprazole (Rexulti) 0.5 mg tab tablet Take 1 mg by mouth two (2) times a day.  busPIRone (BUSPAR) 10 mg tablet Take 20 mg by mouth two (2) times a day.  fentaNYL (DURAGESIC) 25 mcg/hr PATCH 1 Patch by TransDERmal route every seventy-two (72) hours.  ALPRAZolam (XANAX) 1 mg tablet Take 1 mg by mouth three (3) times daily as needed.  insulin aspart U-100 (NovoLOG U-100 Insulin aspart) 100 unit/mL injection USE AS DIRECTED FOR INSULIN PUMP.  UNITS PER DAY. DX E11.65 (Patient taking differently: No sig reported)    promethazine (PHENERGAN) 25 mg tablet TAKE ONE TABLET BY MOUTH EVERY 6 HOURS AS NEEDED FOR NAUSEA    fluticasone propionate (FLONASE) 50 mcg/actuation nasal spray 2 Sprays by Both Nostrils route daily as needed for Rhinitis.  clopidogreL (Plavix) 75 mg tab Take 1 Tab by mouth daily.  bumetanide (BUMEX) 1 mg tablet Take 1 Tab by mouth two (2) times a day.  glucagon (Glucagon Emergency Kit, human,) 1 mg injection USE AS DIRECTED    atorvastatin (LIPITOR) 40 mg tablet TAKE ONE TABLET BY MOUTH DAILY    esomeprazole (NEXIUM) 40 mg capsule TAKE ONE CAPSULE BY MOUTH DAILY    insulin pump (PATIENT SUPPLIED) misc by SubCUTAneous route as needed. Insulin Type: Novolog  Basal Dose:  midnight-midnight: 3.4 units/hr    Target B- mg/dL  Corrective Dose: 1 unit per 50 mg/dL over 150 mg/dL  Insulin: Carb ratio: 1 units: 15 grams  Last time insulin pump supplies changed: 21  Last time insulin pump refilled: 21    citalopram (CELEXA) 20 mg tablet Take 40 mg by mouth daily.  traZODone (DESYREL) 50 mg tablet Take 100 mg by mouth nightly.  levonorgestrel (MIRENA) 20 mcg/24 hr (5 years) IUD 1 Device by IntraUTERine route once.  fentaNYL (DURAGESIC) 100 mcg/hr PATCH 1 Patch by TransDERmal route every seventy-two (72) hours.  zinc sulfate (Zinc-220) 220 (50) mg capsule Take 1 Cap by mouth daily.      No current facility-administered medications for this visit. Allergies   Allergen Reactions    Metolazone Other (comments)     Caused hyponatremia    Zocor [Simvastatin] Myalgia    Lisinopril Cough    Medrol [Methylprednisolone] Other (comments)     Caused severe hyperglycemia with the medrol pack     Review of Systems: PER HPI    Physical Examination:  - GENERAL: NCAT, Appears well nourished   - EYES: EOMI, non-icteric, no proptosis   - Ear/Nose/Throat: NCAT, no visible inflammation or masses   - CARDIOVASCULAR: no cyanosis, no visible JVD   - RESPIRATORY: respiratory effort normal without any distress or labored breathing   - MUSCULOSKELETAL: Normal ROM of neck and upper extremities observed   - SKIN: No rash on face  - NEUROLOGIC:  No facial asymmetry (Cranial nerve 7 motor function), No gaze palsy   - PSYCHIATRIC: Normal affect, Normal insight and judgement       Data Reviewed:   Component      Latest Ref Rng & Units 4/7/2021 4/7/2021          11:30 AM 11:30 AM   Glucose      65 - 99 mg/dL  112 (H)   BUN      6 - 24 mg/dL  17   Creatinine      0.57 - 1.00 mg/dL  1.15 (H)   GFR est non-AA      >59 mL/min/1.73  56 (L)   GFR est AA      >59 mL/min/1.73  64   BUN/Creatinine ratio      9 - 23  15   Sodium      134 - 144 mmol/L  144   Potassium      3.5 - 5.2 mmol/L  4.5   Chloride      96 - 106 mmol/L  99   CO2      20 - 29 mmol/L  28   Calcium      8.7 - 10.2 mg/dL  9.3   Hemoglobin A1c, (calculated)      4.8 - 5.6 % 9.6 (H)    Estimated average glucose      mg/dL 229      Assessment/Plan:     1) 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):  Most recent Hgb A1c was 9.6% in 4/21 stable from 1/21 up from 9.4% in 7/20 down from 9.5% in 12/19 down from 10.6% in 9/19 up from 9.3% in 7/19 down from 10.1% in 5/19 up from 10% in 1/19 up from 9.2% in 10/18 up from 9% in 7/18 up from 8.2% in 4/18 down from 8.8% in 11/17 up from 7.4% in 9/17 down from 9.4% in 2/17 up from 9.1% in 11/16 down from 10% in 3/16 up from 9.4% in 12/15 up from 9.2% in 9/15 down from 9.7% in 5/15 down from 10.3% in 1/15 up from 8.9% in 10/14 down from 11.1% in 4/14 up from 9.2% in 1/14 up from 8.6% in March 2013 down from 8.9% in November down from 9.7% in Aug 2012 up from 9.2% in October 2011 up from 8.7% in May down from 9.7% in March up from 9.1% in July 2010 down from 10.2% in January down from 13.7% in October 2009. Made her carb ratio less aggressive to avoid lows after meals. - cont current pump settings aside form change below  - check bs 8 times daily due to fluctuating blood sugars  - foot exam done 9/19  - microalbumin was 69 in 7/10 up to 392 in March 2011 and 621 in May down to 216 in October 2011 and 37.3 in 4/14 and up to 183 in 12/15 and 300 in 9/16, down to 85 in 4/18 and 10.2 in 9/19 and normal ever since, last in 1/21  - optho UTD 3/18--due now  - check A1c and cmp prior to next visit      2) HTN NOS (401.9): Blood pressure was at goal < 140/90 at last visit  - cont current regimen      3) Hyperlipidemia (272.4): Given DM, goal LDL is < 100, non-HDL < 130, and TGs < 150. LDL was 146 in January 2010, down to 124 in July and 93 in March 2011 with taking 5 mg of crestor daily. Her crestor was changed to pravastatin by Dr. Clay Hahn because of cost in Feb 2012. LDL 93 in 8/12 but her HDL was high at 138 due to ETOH intake. LDL down to 39 in 11/12 but up to 102 in 3/13. Off pravastatin at this time and previously was on 10 mg daily.  in 1/14. Up to 167 in 4/14 so started lovastatin 20 mg daily but she couldn't afford this. She has been started on crestor 40 mg daily by crossover clinic and LDL 64 in 10/14 and 55 in 1/15. Was 37 in 3/16 so dose decreased to 20 mg daily and LDL 23 in 2/17. Was changed to lipitor 1/2 of 80 mg daily when clinic couldn't get crestor any longer and LDL 37 in 9/17. This was stopped during her hospital stay in 11/17 but was restarted by Dr. Nicole Vargas in 12/17 and 42 in 4/18. Up to 94 in 8/18. Down to 79 in 1/19 and 88 in 9/19 and 78 in 12/19 and 61 in 7/20 and 54 in 1/21  - cont lipitor 40 mg daily  - check lipids in 1/22      4) Elevated LFTs (790.6): I told her previously that her LFTs were elevated likely due to ETOH intake so I advised her to cut back on this and her repeat LFTs were normal in 3/13 and 1/14 and 4/14 and 10/14 and 1/15 and 12/15 and 3/16. AST up to 76 in 2/17 but back to normal in 5/17 and has been normal ever since so will follow this. 5) Borderline abnormal TFT: TSH 2.1 in 12/11 but up to 4.01 in 11/12 and 4.75 in 11/17 during 2 hospital stays. Up to 5.45 in 1/19 with Dr. Kristi Jose. Repeat TSH 1.24 and FT4 0.95 and TPO ab 21 in 2/19 so held on any treatment. TSH up to 3.36 in 9/19 but down to 2.61 in 12/19 and 1.59 in 7/19  - check TSH prior to next visit      Patient Instructions   1) Current settings are as follows:  - basal: 12a: 1.55, 4:30a: 1.65, 10p: 1.55  - Carb ratio: 15  - sensitivity: 20  - target: 12a: 130-150, 6a: 100-130, 10p: 130-150  - active insulin time: 3 hours    I made your carb ratio less aggressive at 15 to help with lows after eating. 2) Your BUN and creatinine are markers of kidney function. Your creatinine was 1.15 up slightly from 1.0 at last check. 3) Please come for a follow up visit on 7/20/21 at 12/10pm in our [place] office. 4) I put an order directly into the [employer] system to repeat your labs in the 1-2 weeks prior to your next visit so just ask for the order under my name and you will receive a courtesy reminder through [employer] to have these drawn.       Follow-up and Dispositions    · Return 7/20/21 at 12/10pm.               Copy sent to:  Dr. Walter Jose via [facility]  Dr. James Zaidi

## 2021-05-18 ENCOUNTER — TELEPHONE (OUTPATIENT)
Dept: ENDOCRINOLOGY | Age: 51
End: 2021-05-18

## 2021-05-18 NOTE — TELEPHONE ENCOUNTER
----- Message from Irwin Painter sent at 5/18/2021 10:39 AM EDT -----  Regarding: Dr. Arsenio Lucia: [phone]  General Message/Vendor Calls    Caller's first and last name:Pt      Reason for call:Pt is getting 9 teeth pulled out tomorrow and would like a to know what to set her insulin pump at. Callback required yes/no and why:Yes, discuss insulin pump.        Best contact number(s):[phone]      Details to clarify the request:n/a      Irwin Painter

## 2021-05-18 NOTE — TELEPHONE ENCOUNTER
Have her set a temporary basal of 80% starting 30 minutes prior to the procedure and can run this until 2 hours after the procedure is over.

## 2021-07-06 DIAGNOSIS — R79.89 ELEVATED LFTS: ICD-10-CM

## 2021-07-06 DIAGNOSIS — I10 ESSENTIAL HYPERTENSION: ICD-10-CM

## 2021-07-06 DIAGNOSIS — R94.6 BORDERLINE ABNORMAL THYROID FUNCTION TEST: ICD-10-CM

## 2021-07-06 DIAGNOSIS — E78.5 HYPERLIPIDEMIA LDL GOAL <100: ICD-10-CM

## 2021-07-26 ENCOUNTER — TELEPHONE (OUTPATIENT)
Dept: ENDOCRINOLOGY | Age: 51
End: 2021-07-26

## 2021-07-26 NOTE — PROGRESS NOTES
Pt called noting that at 3pm today she had a bout of diarrhea and her BG had increased to 500+. She changed her infusion site and took 25 units of her humalog insulin. An hour later her BG was still 500 so she took an additional 25 units of humalog. She denied fever, chills, SOB,CP. I recommend she sip water to stay well hydrated and repeat her BG in 30-45 min and if her BR was still >500 to call me back.

## 2021-07-28 ENCOUNTER — APPOINTMENT (OUTPATIENT)
Dept: GENERAL RADIOLOGY | Age: 51
DRG: 638 | End: 2021-07-28
Attending: EMERGENCY MEDICINE
Payer: MEDICARE

## 2021-07-28 ENCOUNTER — HOSPITAL ENCOUNTER (INPATIENT)
Age: 51
LOS: 2 days | Discharge: HOME | DRG: 638 | End: 2021-07-30
Attending: EMERGENCY MEDICINE | Admitting: INTERNAL MEDICINE
Payer: MEDICARE

## 2021-07-28 ENCOUNTER — TELEPHONE (OUTPATIENT)
Dept: ENDOCRINOLOGY | Age: 51
End: 2021-07-28

## 2021-07-28 DIAGNOSIS — R73.9 HYPERGLYCEMIA: Primary | ICD-10-CM

## 2021-07-28 DIAGNOSIS — N17.9 AKI (ACUTE KIDNEY INJURY) (HCC): ICD-10-CM

## 2021-07-28 DIAGNOSIS — N30.00 ACUTE CYSTITIS WITHOUT HEMATURIA: ICD-10-CM

## 2021-07-28 LAB
ADMINISTERED INITIALS, ADMINIT: NORMAL
ALBUMIN SERPL-MCNC: 4.1 G/DL (ref 3.5–5)
ALBUMIN/GLOB SERPL: 1.1 {RATIO} (ref 1.1–2.2)
ALP SERPL-CCNC: 107 U/L (ref 45–117)
ALT SERPL-CCNC: 20 U/L (ref 12–78)
ANION GAP SERPL CALC-SCNC: 10 MMOL/L (ref 5–15)
ANION GAP SERPL CALC-SCNC: 15 MMOL/L (ref 5–15)
ANION GAP SERPL CALC-SCNC: 7 MMOL/L (ref 5–15)
APPEARANCE UR: ABNORMAL
AST SERPL-CCNC: 15 U/L (ref 15–37)
ATRIAL RATE: 93 BPM
BACTERIA URNS QL MICRO: ABNORMAL /HPF
BASE DEFICIT BLD-SCNC: 0.9 MMOL/L
BASOPHILS # BLD: 0 K/UL (ref 0–0.1)
BASOPHILS NFR BLD: 0 % (ref 0–1)
BILIRUB SERPL-MCNC: 0.7 MG/DL (ref 0.2–1)
BILIRUB UR QL: NEGATIVE
BUN SERPL-MCNC: 27 MG/DL (ref 6–20)
BUN SERPL-MCNC: 28 MG/DL (ref 6–20)
BUN SERPL-MCNC: 32 MG/DL (ref 6–20)
BUN/CREAT SERPL: 16 (ref 12–20)
BUN/CREAT SERPL: 18 (ref 12–20)
BUN/CREAT SERPL: 19 (ref 12–20)
CA-I BLD-MCNC: 1.08 MMOL/L (ref 1.12–1.32)
CALCIUM SERPL-MCNC: 8 MG/DL (ref 8.5–10.1)
CALCIUM SERPL-MCNC: 8.5 MG/DL (ref 8.5–10.1)
CALCIUM SERPL-MCNC: 9 MG/DL (ref 8.5–10.1)
CALCULATED P AXIS, ECG09: 73 DEGREES
CALCULATED R AXIS, ECG10: -10 DEGREES
CALCULATED T AXIS, ECG11: 52 DEGREES
CHLORIDE BLD-SCNC: 93 MMOL/L (ref 100–108)
CHLORIDE SERPL-SCNC: 100 MMOL/L (ref 97–108)
CHLORIDE SERPL-SCNC: 104 MMOL/L (ref 97–108)
CHLORIDE SERPL-SCNC: 90 MMOL/L (ref 97–108)
CO2 BLD-SCNC: 26 MMOL/L (ref 19–24)
CO2 SERPL-SCNC: 23 MMOL/L (ref 21–32)
CO2 SERPL-SCNC: 24 MMOL/L (ref 21–32)
CO2 SERPL-SCNC: 25 MMOL/L (ref 21–32)
COLOR UR: ABNORMAL
CREAT SERPL-MCNC: 1.39 MG/DL (ref 0.55–1.02)
CREAT SERPL-MCNC: 1.72 MG/DL (ref 0.55–1.02)
CREAT SERPL-MCNC: 1.73 MG/DL (ref 0.55–1.02)
CREAT UR-MCNC: 1.5 MG/DL (ref 0.6–1.3)
D50 ADMINISTERED, D50ADM: 0 ML
D50 ORDER, D50ORD: 0 ML
DIAGNOSIS, 93000: NORMAL
DIFFERENTIAL METHOD BLD: ABNORMAL
EOSINOPHIL # BLD: 0 K/UL (ref 0–0.4)
EOSINOPHIL NFR BLD: 0 % (ref 0–7)
EPITH CASTS URNS QL MICRO: ABNORMAL /LPF
ERYTHROCYTE [DISTWIDTH] IN BLOOD BY AUTOMATED COUNT: 15.8 % (ref 11.5–14.5)
GLOBULIN SER CALC-MCNC: 3.6 G/DL (ref 2–4)
GLSCOM COMMENTS: NORMAL
GLUCOSE BLD STRIP.AUTO-MCNC: 114 MG/DL (ref 65–117)
GLUCOSE BLD STRIP.AUTO-MCNC: 125 MG/DL (ref 65–117)
GLUCOSE BLD STRIP.AUTO-MCNC: 126 MG/DL (ref 65–117)
GLUCOSE BLD STRIP.AUTO-MCNC: 214 MG/DL (ref 65–117)
GLUCOSE BLD STRIP.AUTO-MCNC: 289 MG/DL (ref 65–117)
GLUCOSE BLD STRIP.AUTO-MCNC: 294 MG/DL (ref 65–117)
GLUCOSE BLD STRIP.AUTO-MCNC: 391 MG/DL (ref 65–117)
GLUCOSE BLD STRIP.AUTO-MCNC: 503 MG/DL (ref 65–117)
GLUCOSE BLD STRIP.AUTO-MCNC: 573 MG/DL (ref 65–117)
GLUCOSE BLD STRIP.AUTO-MCNC: 595 MG/DL (ref 65–117)
GLUCOSE BLD STRIP.AUTO-MCNC: 648 MG/DL (ref 74–106)
GLUCOSE BLD STRIP.AUTO-MCNC: 95 MG/DL (ref 65–117)
GLUCOSE SERPL-MCNC: 122 MG/DL (ref 65–100)
GLUCOSE SERPL-MCNC: 342 MG/DL (ref 65–100)
GLUCOSE SERPL-MCNC: 568 MG/DL (ref 65–100)
GLUCOSE UR STRIP.AUTO-MCNC: 500 MG/DL
GLUCOSE, GLC: 114 MG/DL
GLUCOSE, GLC: 125 MG/DL
GLUCOSE, GLC: 126 MG/DL
GLUCOSE, GLC: 214 MG/DL
GLUCOSE, GLC: 289 MG/DL
GLUCOSE, GLC: 294 MG/DL
GLUCOSE, GLC: 391 MG/DL
GLUCOSE, GLC: 503 MG/DL
GLUCOSE, GLC: 573 MG/DL
GLUCOSE, GLC: 95 MG/DL
HCG UR QL: NEGATIVE
HCO3 BLDA-SCNC: 26 MMOL/L
HCT VFR BLD AUTO: 41.1 % (ref 35–47)
HGB BLD-MCNC: 10.7 G/DL (ref 11.5–16)
HGB BLD-MCNC: 13.3 G/DL (ref 11.5–16)
HGB UR QL STRIP: NEGATIVE
HIGH TARGET, HITG: 250 MG/DL
HYALINE CASTS URNS QL MICRO: ABNORMAL /LPF (ref 0–5)
IMM GRANULOCYTES # BLD AUTO: 0 K/UL (ref 0–0.04)
IMM GRANULOCYTES NFR BLD AUTO: 0 % (ref 0–0.5)
INSULIN ADMINSTERED, INSADM: 0.7 UNITS/HOUR
INSULIN ADMINSTERED, INSADM: 1.1 UNITS/HOUR
INSULIN ADMINSTERED, INSADM: 1.1 UNITS/HOUR
INSULIN ADMINSTERED, INSADM: 10.3 UNITS/HOUR
INSULIN ADMINSTERED, INSADM: 11.5 UNITS/HOUR
INSULIN ADMINSTERED, INSADM: 13.3 UNITS/HOUR
INSULIN ADMINSTERED, INSADM: 2.6 UNITS/HOUR
INSULIN ADMINSTERED, INSADM: 7.7 UNITS/HOUR
INSULIN ADMINSTERED, INSADM: 9.4 UNITS/HOUR
INSULIN ADMINSTERED, INSADM: 9.9 UNITS/HOUR
INSULIN ORDER, INSORD: 0.7 UNITS/HOUR
INSULIN ORDER, INSORD: 1.1 UNITS/HOUR
INSULIN ORDER, INSORD: 1.1 UNITS/HOUR
INSULIN ORDER, INSORD: 10.3 UNITS/HOUR
INSULIN ORDER, INSORD: 11.5 UNITS/HOUR
INSULIN ORDER, INSORD: 13.3 UNITS/HOUR
INSULIN ORDER, INSORD: 2.6 UNITS/HOUR
INSULIN ORDER, INSORD: 7.7 UNITS/HOUR
INSULIN ORDER, INSORD: 9.4 UNITS/HOUR
INSULIN ORDER, INSORD: 9.9 UNITS/HOUR
KETONES UR QL STRIP.AUTO: ABNORMAL MG/DL
LEUKOCYTE ESTERASE UR QL STRIP.AUTO: ABNORMAL
LOW TARGET, LOT: 150 MG/DL
LYMPHOCYTES # BLD: 1.8 K/UL (ref 0.8–3.5)
LYMPHOCYTES NFR BLD: 22 % (ref 12–49)
MAGNESIUM SERPL-MCNC: 1.5 MG/DL (ref 1.6–2.4)
MAGNESIUM SERPL-MCNC: 1.7 MG/DL (ref 1.6–2.4)
MCH RBC QN AUTO: 29.2 PG (ref 26–34)
MCHC RBC AUTO-ENTMCNC: 32.4 G/DL (ref 30–36.5)
MCV RBC AUTO: 90.1 FL (ref 80–99)
MINUTES UNTIL NEXT BG, NBG: 60 MIN
MONOCYTES # BLD: 0.4 K/UL (ref 0–1)
MONOCYTES NFR BLD: 4 % (ref 5–13)
MULTIPLIER, MUL: 0.01
MULTIPLIER, MUL: 0.02
MULTIPLIER, MUL: 0.02
MULTIPLIER, MUL: 0.03
MULTIPLIER, MUL: 0.04
MULTIPLIER, MUL: 0.04
MULTIPLIER, MUL: 0.05
MULTIPLIER, MUL: 0.05
NEUTS SEG # BLD: 6.2 K/UL (ref 1.8–8)
NEUTS SEG NFR BLD: 74 % (ref 32–75)
NITRITE UR QL STRIP.AUTO: NEGATIVE
NRBC # BLD: 0 K/UL (ref 0–0.01)
NRBC BLD-RTO: 0 PER 100 WBC
ORDER INITIALS, ORDINIT: NORMAL
P-R INTERVAL, ECG05: 176 MS
PCO2 BLDV: 47.7 MMHG (ref 41–51)
PH BLDV: 7.34 [PH] (ref 7.32–7.42)
PH UR STRIP: 5.5 [PH] (ref 5–8)
PHOSPHATE SERPL-MCNC: 2 MG/DL (ref 2.6–4.7)
PLATELET # BLD AUTO: 227 K/UL (ref 150–400)
PMV BLD AUTO: 10.4 FL (ref 8.9–12.9)
PO2 BLDV: 21 MMHG (ref 25–40)
POTASSIUM BLD-SCNC: 4.9 MMOL/L (ref 3.5–5.5)
POTASSIUM SERPL-SCNC: 3.5 MMOL/L (ref 3.5–5.1)
POTASSIUM SERPL-SCNC: 3.7 MMOL/L (ref 3.5–5.1)
POTASSIUM SERPL-SCNC: 4.7 MMOL/L (ref 3.5–5.1)
PROT SERPL-MCNC: 7.7 G/DL (ref 6.4–8.2)
PROT UR STRIP-MCNC: NEGATIVE MG/DL
Q-T INTERVAL, ECG07: 352 MS
QRS DURATION, ECG06: 70 MS
QTC CALCULATION (BEZET), ECG08: 437 MS
RBC # BLD AUTO: 4.56 M/UL (ref 3.8–5.2)
RBC #/AREA URNS HPF: ABNORMAL /HPF (ref 0–5)
SERVICE CMNT-IMP: ABNORMAL
SERVICE CMNT-IMP: NORMAL
SERVICE CMNT-IMP: NORMAL
SODIUM BLD-SCNC: 128 MMOL/L (ref 136–145)
SODIUM SERPL-SCNC: 128 MMOL/L (ref 136–145)
SODIUM SERPL-SCNC: 134 MMOL/L (ref 136–145)
SODIUM SERPL-SCNC: 136 MMOL/L (ref 136–145)
SP GR UR REFRACTOMETRY: 1.01 (ref 1–1.03)
SPECIMEN SITE: ABNORMAL
TROPONIN I SERPL-MCNC: <0.05 NG/ML
UA: UC IF INDICATED,UAUC: ABNORMAL
UROBILINOGEN UR QL STRIP.AUTO: 0.2 EU/DL (ref 0.2–1)
VENTRICULAR RATE, ECG03: 93 BPM
WBC # BLD AUTO: 8.5 K/UL (ref 3.6–11)
WBC URNS QL MICRO: ABNORMAL /HPF (ref 0–4)

## 2021-07-28 PROCEDURE — 74011000258 HC RX REV CODE- 258: Performed by: EMERGENCY MEDICINE

## 2021-07-28 PROCEDURE — 85018 HEMOGLOBIN: CPT

## 2021-07-28 PROCEDURE — 65660000001 HC RM ICU INTERMED STEPDOWN

## 2021-07-28 PROCEDURE — 96374 THER/PROPH/DIAG INJ IV PUSH: CPT

## 2021-07-28 PROCEDURE — 82962 GLUCOSE BLOOD TEST: CPT

## 2021-07-28 PROCEDURE — 71045 X-RAY EXAM CHEST 1 VIEW: CPT

## 2021-07-28 PROCEDURE — 74011250637 HC RX REV CODE- 250/637: Performed by: EMERGENCY MEDICINE

## 2021-07-28 PROCEDURE — 84295 ASSAY OF SERUM SODIUM: CPT

## 2021-07-28 PROCEDURE — 74011250637 HC RX REV CODE- 250/637: Performed by: INTERNAL MEDICINE

## 2021-07-28 PROCEDURE — 74011636637 HC RX REV CODE- 636/637: Performed by: EMERGENCY MEDICINE

## 2021-07-28 PROCEDURE — 80053 COMPREHEN METABOLIC PANEL: CPT

## 2021-07-28 PROCEDURE — 84100 ASSAY OF PHOSPHORUS: CPT

## 2021-07-28 PROCEDURE — 81025 URINE PREGNANCY TEST: CPT

## 2021-07-28 PROCEDURE — 93005 ELECTROCARDIOGRAM TRACING: CPT

## 2021-07-28 PROCEDURE — 74011250636 HC RX REV CODE- 250/636: Performed by: NURSE PRACTITIONER

## 2021-07-28 PROCEDURE — 85025 COMPLETE CBC W/AUTO DIFF WBC: CPT

## 2021-07-28 PROCEDURE — 87086 URINE CULTURE/COLONY COUNT: CPT

## 2021-07-28 PROCEDURE — 83735 ASSAY OF MAGNESIUM: CPT

## 2021-07-28 PROCEDURE — 99285 EMERGENCY DEPT VISIT HI MDM: CPT

## 2021-07-28 PROCEDURE — 74011250636 HC RX REV CODE- 250/636: Performed by: EMERGENCY MEDICINE

## 2021-07-28 PROCEDURE — 80048 BASIC METABOLIC PNL TOTAL CA: CPT

## 2021-07-28 PROCEDURE — 84484 ASSAY OF TROPONIN QUANT: CPT

## 2021-07-28 PROCEDURE — 81001 URINALYSIS AUTO W/SCOPE: CPT

## 2021-07-28 PROCEDURE — 36415 COLL VENOUS BLD VENIPUNCTURE: CPT

## 2021-07-28 RX ORDER — ASPIRIN 81 MG/1
81 TABLET ORAL DAILY
COMMUNITY

## 2021-07-28 RX ORDER — BUSPIRONE HYDROCHLORIDE 10 MG/1
20 TABLET ORAL 2 TIMES DAILY
Status: DISCONTINUED | OUTPATIENT
Start: 2021-07-28 | End: 2021-07-30 | Stop reason: HOSPADM

## 2021-07-28 RX ORDER — INSULIN LISPRO 100 [IU]/ML
INJECTION, SOLUTION INTRAVENOUS; SUBCUTANEOUS
Status: DISCONTINUED | OUTPATIENT
Start: 2021-07-28 | End: 2021-07-29

## 2021-07-28 RX ORDER — ARIPIPRAZOLE 5 MG/1
30 TABLET ORAL DAILY
Status: DISCONTINUED | OUTPATIENT
Start: 2021-07-29 | End: 2021-07-30 | Stop reason: HOSPADM

## 2021-07-28 RX ORDER — POLYETHYLENE GLYCOL 3350 17 G/17G
17 POWDER, FOR SOLUTION ORAL DAILY PRN
Status: DISCONTINUED | OUTPATIENT
Start: 2021-07-28 | End: 2021-07-30 | Stop reason: HOSPADM

## 2021-07-28 RX ORDER — ATORVASTATIN CALCIUM 40 MG/1
40 TABLET, FILM COATED ORAL DAILY
Status: DISCONTINUED | OUTPATIENT
Start: 2021-07-29 | End: 2021-07-30 | Stop reason: HOSPADM

## 2021-07-28 RX ORDER — OXYCODONE HYDROCHLORIDE 5 MG/1
5 TABLET ORAL
Status: COMPLETED | OUTPATIENT
Start: 2021-07-28 | End: 2021-07-28

## 2021-07-28 RX ORDER — CITALOPRAM 20 MG/1
40 TABLET, FILM COATED ORAL DAILY
Status: DISCONTINUED | OUTPATIENT
Start: 2021-07-29 | End: 2021-07-30 | Stop reason: HOSPADM

## 2021-07-28 RX ORDER — ASCORBIC ACID 500 MG
250 TABLET ORAL 2 TIMES DAILY
Status: DISCONTINUED | OUTPATIENT
Start: 2021-07-28 | End: 2021-07-30 | Stop reason: HOSPADM

## 2021-07-28 RX ORDER — OXYCODONE HYDROCHLORIDE 5 MG/1
5 TABLET ORAL
Status: DISCONTINUED | OUTPATIENT
Start: 2021-07-28 | End: 2021-07-30 | Stop reason: HOSPADM

## 2021-07-28 RX ORDER — INSULIN ASPART 100 [IU]/ML
INJECTION, SOLUTION INTRAVENOUS; SUBCUTANEOUS
COMMUNITY
End: 2021-10-29

## 2021-07-28 RX ORDER — ONDANSETRON 4 MG/1
4 TABLET, ORALLY DISINTEGRATING ORAL
Status: DISCONTINUED | OUTPATIENT
Start: 2021-07-28 | End: 2021-07-30 | Stop reason: HOSPADM

## 2021-07-28 RX ORDER — ALPRAZOLAM 0.5 MG/1
1 TABLET ORAL 3 TIMES DAILY
Status: DISCONTINUED | OUTPATIENT
Start: 2021-07-28 | End: 2021-07-30 | Stop reason: HOSPADM

## 2021-07-28 RX ORDER — ACETAMINOPHEN 650 MG/1
650 SUPPOSITORY RECTAL
Status: DISCONTINUED | OUTPATIENT
Start: 2021-07-28 | End: 2021-07-30 | Stop reason: HOSPADM

## 2021-07-28 RX ORDER — ROSUVASTATIN CALCIUM 40 MG/1
40 TABLET, COATED ORAL DAILY
COMMUNITY

## 2021-07-28 RX ORDER — OXYCODONE HYDROCHLORIDE 5 MG/1
5 TABLET ORAL
COMMUNITY

## 2021-07-28 RX ORDER — ELAGOLIX 150 MG/1
150 TABLET, FILM COATED ORAL DAILY
COMMUNITY
End: 2022-09-13

## 2021-07-28 RX ORDER — ACETAMINOPHEN 325 MG/1
650 TABLET ORAL
Status: DISCONTINUED | OUTPATIENT
Start: 2021-07-28 | End: 2021-07-30 | Stop reason: HOSPADM

## 2021-07-28 RX ORDER — DICLOFENAC SODIUM 10 MG/G
2 GEL TOPICAL
COMMUNITY

## 2021-07-28 RX ORDER — SODIUM CHLORIDE 0.9 % (FLUSH) 0.9 %
5-40 SYRINGE (ML) INJECTION EVERY 8 HOURS
Status: DISCONTINUED | OUTPATIENT
Start: 2021-07-28 | End: 2021-07-30 | Stop reason: HOSPADM

## 2021-07-28 RX ORDER — DEXTROSE, SODIUM CHLORIDE, AND POTASSIUM CHLORIDE 5; .45; .15 G/100ML; G/100ML; G/100ML
250 INJECTION INTRAVENOUS CONTINUOUS
Status: DISCONTINUED | OUTPATIENT
Start: 2021-07-28 | End: 2021-07-29

## 2021-07-28 RX ORDER — MAGNESIUM SULFATE 100 %
4 CRYSTALS MISCELLANEOUS AS NEEDED
Status: DISCONTINUED | OUTPATIENT
Start: 2021-07-28 | End: 2021-07-29

## 2021-07-28 RX ORDER — BUMETANIDE 1 MG/1
1 TABLET ORAL 2 TIMES DAILY
Status: DISCONTINUED | OUTPATIENT
Start: 2021-07-28 | End: 2021-07-30 | Stop reason: HOSPADM

## 2021-07-28 RX ORDER — ONDANSETRON 2 MG/ML
4 INJECTION INTRAMUSCULAR; INTRAVENOUS
Status: DISCONTINUED | OUTPATIENT
Start: 2021-07-28 | End: 2021-07-30 | Stop reason: HOSPADM

## 2021-07-28 RX ORDER — CARVEDILOL 6.25 MG/1
6.25 TABLET ORAL 2 TIMES DAILY WITH MEALS
Status: DISCONTINUED | OUTPATIENT
Start: 2021-07-28 | End: 2021-07-30 | Stop reason: HOSPADM

## 2021-07-28 RX ORDER — SODIUM CHLORIDE 0.9 % (FLUSH) 0.9 %
5-40 SYRINGE (ML) INJECTION AS NEEDED
Status: DISCONTINUED | OUTPATIENT
Start: 2021-07-28 | End: 2021-07-30 | Stop reason: HOSPADM

## 2021-07-28 RX ORDER — POTASSIUM CHLORIDE AND SODIUM CHLORIDE 450; 150 MG/100ML; MG/100ML
INJECTION, SOLUTION INTRAVENOUS CONTINUOUS
Status: DISCONTINUED | OUTPATIENT
Start: 2021-07-28 | End: 2021-07-28

## 2021-07-28 RX ORDER — ENOXAPARIN SODIUM 100 MG/ML
40 INJECTION SUBCUTANEOUS DAILY
Status: DISCONTINUED | OUTPATIENT
Start: 2021-07-29 | End: 2021-07-30 | Stop reason: HOSPADM

## 2021-07-28 RX ORDER — DEXTROSE 50 % IN WATER (D50W) INTRAVENOUS SYRINGE
25-50 AS NEEDED
Status: DISCONTINUED | OUTPATIENT
Start: 2021-07-28 | End: 2021-07-29

## 2021-07-28 RX ORDER — CLOPIDOGREL BISULFATE 75 MG/1
75 TABLET ORAL DAILY
Status: DISCONTINUED | OUTPATIENT
Start: 2021-07-29 | End: 2021-07-30 | Stop reason: HOSPADM

## 2021-07-28 RX ADMIN — BUSPIRONE HYDROCHLORIDE 20 MG: 10 TABLET ORAL at 17:11

## 2021-07-28 RX ADMIN — BUMETANIDE 1 MG: 1 TABLET ORAL at 17:11

## 2021-07-28 RX ADMIN — Medication 10 ML: at 17:09

## 2021-07-28 RX ADMIN — Medication 10 ML: at 22:44

## 2021-07-28 RX ADMIN — POTASSIUM CHLORIDE, DEXTROSE MONOHYDRATE AND SODIUM CHLORIDE 250 ML/HR: 150; 5; 450 INJECTION, SOLUTION INTRAVENOUS at 20:40

## 2021-07-28 RX ADMIN — ALPRAZOLAM 1 MG: 0.5 TABLET ORAL at 22:43

## 2021-07-28 RX ADMIN — CEFTRIAXONE 1 G: 1 INJECTION, POWDER, FOR SOLUTION INTRAMUSCULAR; INTRAVENOUS at 13:57

## 2021-07-28 RX ADMIN — POTASSIUM CHLORIDE AND SODIUM CHLORIDE: 450; 150 INJECTION, SOLUTION INTRAVENOUS at 19:55

## 2021-07-28 RX ADMIN — SODIUM CHLORIDE 10.3 UNITS/HR: 9 INJECTION, SOLUTION INTRAVENOUS at 13:59

## 2021-07-28 RX ADMIN — OXYCODONE 5 MG: 5 TABLET ORAL at 17:53

## 2021-07-28 RX ADMIN — ALPRAZOLAM 1 MG: 0.5 TABLET ORAL at 16:11

## 2021-07-28 RX ADMIN — SODIUM CHLORIDE 1000 ML: 9 INJECTION, SOLUTION INTRAVENOUS at 13:58

## 2021-07-28 RX ADMIN — OXYCODONE 5 MG: 5 TABLET ORAL at 12:37

## 2021-07-28 RX ADMIN — SODIUM CHLORIDE 1000 ML: 9 INJECTION, SOLUTION INTRAVENOUS at 12:34

## 2021-07-28 RX ADMIN — POTASSIUM CHLORIDE AND SODIUM CHLORIDE: 450; 150 INJECTION, SOLUTION INTRAVENOUS at 15:07

## 2021-07-28 RX ADMIN — OXYCODONE HYDROCHLORIDE AND ACETAMINOPHEN 250 MG: 500 TABLET ORAL at 17:11

## 2021-07-28 NOTE — ED NOTES
1115:  Assumed care of patient from triage, patient placed on monitor x 3, SR x 2, call light in reach. Patient reports High Blood Glucose and feeling dehydrated, her sugar was 600-800 yesterday. 1125: Doctor at bedside    [phone]: Hospitalist bedside    [phone]: Patient ambulating to bathroom at this time    1618: Patient is being transferred to [address], Room # 2212. Report given to Luis Philip RN on [facility] for routine progression of care. Report consisted of the following information SBAR, Kardex, ED Summary, MAR and Recent Results. Patient transferred to receiving unit by: Magdalene RN (RN or tech name). Outstanding consults needed: No     Next labs due: No     The following personal items will be sent with the patient during transfer to the floor:     All valuables:    Cardiac monitoring ordered: Yes, No     The following CURRENT information was reported to the receiving RN:    Code status: Full Code at time of transfer    Last set of vital signs:  Vital Signs  Level of Consciousness: Alert (0) (07/28/21 1054)  Temp: 98.7 °F (37.1 °C) (07/28/21 1054)  Temp Source: Oral (07/28/21 1054)  Pulse (Heart Rate): 83 (07/28/21 1600)  Cardiac Rhythm: Sinus Rhythm (07/28/21 1119)  Resp Rate: 30 (07/28/21 1600)  BP: 119/63 (07/28/21 1600)  MAP (Monitor): 77 (07/28/21 1600)  MAP (Calculated): 82 (07/28/21 1600)  BP 1 Location: Left upper arm (07/28/21 1054)  BP 1 Method: Automatic (07/28/21 1054)  BP Patient Position: At rest (07/28/21 1054)  MEWS Score: 1 (07/28/21 1054)         Oxygen Therapy  O2 Sat (%): 97 % (07/28/21 1600)  Pulse via Oximetry: 83 beats per minute (07/28/21 1600)  O2 Device: None (Room air) (07/28/21 1116)      Last pain assessment:  Pain 1  Pain Scale 1: Numeric (0 - 10)  Pain Intensity 1: 5  Pain Location 1: Generalized  Pain Description 1: Aching      Wounds: No     Urinary catheter: voiding  Is there a chew order: No     LDAs:       Peripheral IV 07/28/21 Left Antecubital (Active)   Site Assessment Clean, dry, & intact 07/28/21 1218   Phlebitis Assessment 0 07/28/21 1218   Infiltration Assessment 0 07/28/21 1218   Dressing Status Clean, dry, & intact 07/28/21 1218   Dressing Type Tape;Transparent 07/28/21 1218       Peripheral IV 07/28/21 Left Antecubital (Active)         Opportunity for questions and clarification was provided.     Savannah Hsu RN

## 2021-07-28 NOTE — TELEPHONE ENCOUNTER
Patient walked into the [address] [facility] office stating that she is out of insulin and her blood sugar is in the 500s. Per , go to ER. Patient has a gentleman with her who will take her. She expressed understanding. Per , he will call her [place].

## 2021-07-28 NOTE — TELEPHONE ENCOUNTER
I received a call from Ms. Gricelda Ceballos, saying that her blood sugar has been running in the 500's for the past couple of days. I asked about any new medications and she said no new meds. She did her lab work this AM. She is asking for a vial of Fast Acting Insulin and syringes. I told her I would pass this information on to Dr. Edu Posada.   Oley Dubin

## 2021-07-28 NOTE — TELEPHONE ENCOUNTER
----- Message from Melany Curtis sent at 7/28/2021  3:29 PM EDT -----  Regarding: Dr. Vela Channing: [phone]  General Message/Vendor Calls    Caller's first and last name: Moreno Gallo      Reason for call:Please call back to discuss insulin pump directions/information as the pt is in the ED right now. Callback required yes/no and why:Yes discuss.        Best contact number(s):[phone]      Details to clarify the request:n/a      Melany Curtis

## 2021-07-28 NOTE — TELEPHONE ENCOUNTER
Tried to call this number but it rang and rang and nobody picked up at this extension. I'm aware patient has been admitted at this time.

## 2021-07-28 NOTE — PROGRESS NOTES
1618: TRANSFER - IN REPORT:    Verbal report received from Beryl Fernandez RN(name) on Yaniv Martin  being received from ED(unit) for routine progression of care      Report consisted of patients Situation, Background, Assessment and   Recommendations(SBAR). Information from the following report(s) SBAR and Kardex was reviewed with the receiving nurse. Opportunity for questions and clarification was provided. Assessment completed upon patients arrival to unit and care assumed.

## 2021-07-28 NOTE — ED PROVIDER NOTES
EMERGENCY DEPARTMENT HISTORY AND PHYSICAL EXAM      Date: 7/28/2021  Patient Name: Babak Calderon    History of Presenting Illness     Chief Complaint   Patient presents with    High Blood Sugar     was reading greater than 600 at home, is 595 in triage. Pt reports she has an insulin pump that she believes is working   Zoya De Leon Fatigue     pt reports she feels very fatigued    Shortness of Breath     onset one hour ago       History Provided By: Patient    HPI: Babak Calderon, 49 y.o. female presents to the ED with cc of fatigue. 73-year-old female with history of type 1 diabetes on insulin pump, chronic respiratory failure on oxygen at home as needed, CAD who presents emergency department with a chief complaint of fatigue and high blood glucose. Patient has insulin pump in place, states she has been using and trying to alternate sides. Patient reports despite this, she has noted her blood glucose has been running high in the 5 and 600s. She reports that she began attempting to give herself subcutaneous insulin but states she continues to have high blood glucose readings. She saw her endocrinologist today who referred her to the ED. Patient reports she feels \"awful\" she took her pulse ox which was normal she does use oxygen as needed. Patient reports feeling fatigued, with dry mouth and increased urinary frequency. No fevers, no abdominal pain, nausea, vomiting or diarrhea. There are no other complaints, changes, or physical findings at this time. PCP: Sol Apodaca MD    No current facility-administered medications on file prior to encounter. Current Outpatient Medications on File Prior to Encounter   Medication Sig Dispense Refill    elagolix (Orilissa) 150 mg tab Take 150 mg by mouth daily.  aspirin delayed-release 81 mg tablet Take 81 mg by mouth daily.  diclofenac (Voltaren) 1 % gel Apply 2 g to affected area four (4) times daily as needed for Pain.  RT KNEE      oxyCODONE IR (ROXICODONE) 5 mg immediate release tablet Take 5 mg by mouth every four (4) hours as needed for Pain.  rosuvastatin (Crestor) 40 mg tablet Take 40 mg by mouth daily.  insulin aspart U-100 (NovoLOG U-100 Insulin aspart) 100 unit/mL injection by SubCUTAneous route. USE AS INSTRUCTED PER INSULIN PUMP      glucagon (Glucagon Emergency Kit, human,) 1 mg injection USE AS DIRECTED 2 Vial 11    carvediloL (COREG) 6.25 mg tablet TAKE ONE TABLET BY MOUTH DAILY 90 Tab 2    gabapentin (NEURONTIN) 300 mg capsule TAKE TWO CAPSULES BY MOUTH THREE TIMES A  Cap 5    ARIPiprazole (Abilify) 30 mg tablet Take 30 mg by mouth daily.  brexpiprazole (Rexulti) 0.5 mg tab tablet Take 1 mg by mouth two (2) times a day.  busPIRone (BUSPAR) 10 mg tablet Take 20 mg by mouth two (2) times a day.  fentaNYL (DURAGESIC) 25 mcg/hr PATCH 1 Patch by TransDERmal route every seventy-two (72) hours.  ALPRAZolam (XANAX) 1 mg tablet Take 1 mg by mouth three (3) times daily as needed.  promethazine (PHENERGAN) 25 mg tablet TAKE ONE TABLET BY MOUTH EVERY 6 HOURS AS NEEDED FOR NAUSEA 30 Tab 2    fluticasone propionate (FLONASE) 50 mcg/actuation nasal spray 2 Sprays by Both Nostrils route daily as needed for Rhinitis. 16 g 3    bumetanide (BUMEX) 2 mg tablet Take 2 mg by mouth two (2) times a day.  atorvastatin (LIPITOR) 40 mg tablet TAKE ONE TABLET BY MOUTH DAILY 90 Tab 3    esomeprazole (NEXIUM) 40 mg capsule TAKE ONE CAPSULE BY MOUTH DAILY 30 Cap 11    citalopram (CELEXA) 20 mg tablet Take 40 mg by mouth daily.  traZODone (DESYREL) 50 mg tablet Take 100 mg by mouth nightly.  levonorgestrel (MIRENA) 20 mcg/24 hr (5 years) IUD 1 Device by IntraUTERine route once.  fentaNYL (DURAGESIC) 100 mcg/hr PATCH 1 Patch by TransDERmal route every seventy-two (72) hours.  [DISCONTINUED] OXYGEN-AIR DELIVERY SYSTEMS by Does Not Apply route.       [DISCONTINUED] "[employer] Qiana 14 Day Sensor kit USE AS DIRECTED EVERY 14 DAYS 2 Kit 11    [DISCONTINUED] ascorbic acid, vitamin C, (Vitamin C) 250 mg chew Take 250 mg by mouth two (2) times a day. 14 Tab 0    [DISCONTINUED] zinc sulfate (Zinc-220) 220 (50) mg capsule Take 1 Cap by mouth daily. 7 Cap 0    [DISCONTINUED] insulin aspart U-100 (NovoLOG U-100 Insulin aspart) 100 unit/mL injection USE AS DIRECTED FOR INSULIN PUMP.  UNITS PER DAY. DX E11.65 (Patient taking differently: No sig reported) 30 mL 11    [DISCONTINUED] clopidogreL (Plavix) 75 mg tab Take 1 Tab by mouth daily.  [DISCONTINUED] carvedilol (COREG) 6.25 mg tablet Take 1 Tab by mouth daily. Delete the 12.5 mg dose from profile 90 Tab 3    insulin pump (PATIENT SUPPLIED) misc by SubCUTAneous route as needed.  Insulin Type: NOVOLOG  Basal Dose:  0206-8511: 1.55 units/hr  4541-5347: 1.65 units/hr    Target -4152: 100-130 mg/dL: 3913-8521: 130-150 mg/dL  Corrective Dose:  unit per  mg/dL over  mg/dL  Insulin: Carb ratio: 1 units: 12 grams  Last time insulin pump supplies changed: 21  Last time insulin pump refilled: 21         Past History     Past Medical History:  Past Medical History:   Diagnosis Date    Achilles tendon rupture     along with torn right patellar/femoral ligament    Arthritis     rt. foot    Chronic kidney disease     Chronic pain     abdominal pain    Diabetes (HCC)     IDDM on insulin pump and sensor    [facility]     age 25    Endometriosis     s/p ex-lap 4x    Gastric ulcer     GERD (gastroesophageal reflux disease)     Herpes     Other and unspecified hyperlipidemia     Panic attacks     Psychiatric disorder     anxiety, panic attacks    PTSD (post-traumatic stress disorder)     Unspecified essential hypertension        Past Surgical History:  Past Surgical History:   Procedure Laterality Date    HX COLONOSCOPY      HX GYN      2 d&c's    HX GYN      laparoscopies x5 for endometriosis    HX GYN      mirena in place    HX ORTHOPAEDIC  2002    achiles tendon repair,talus repair    HX ORTHOPAEDIC      injections x2 to right shoulder    HX ORTHOPAEDIC Left 02/07/2019    3rd trigger finger release    [place] CARDIAC SURG PROCEDURE UNLIST         Family History:  Family History   Problem Relation Age of Onset    Diabetes Mother         diet controlled    Diabetes Father         R foot amupation    Liver Disease Father     Heart Disease Paternal Uncle         MI in his 46s    Stroke Neg Hx        Social History:  Social History     Tobacco Use    Smoking status: Current Every Day Smoker     Packs/day: 1.00     Years: 28.00     Pack years: 28.00     Types: Cigarettes    Smokeless tobacco: Current User   Substance Use Topics    Alcohol use: Not Currently     Comment: a beer every few months    Drug use: No     Types: OTC, Prescription       Allergies: Allergies   Allergen Reactions    Metolazone Other (comments)     Caused hyponatremia    Zocor [Simvastatin] Myalgia    Lisinopril Cough    Medrol [Methylprednisolone] Other (comments)     Caused severe hyperglycemia with the medrol pack         Review of Systems   Review of Systems   Constitutional: Positive for fatigue. Negative for activity change, chills and fever. HENT: Negative for facial swelling and voice change. Eyes: Negative for redness. Respiratory: Negative for cough, shortness of breath and wheezing. Cardiovascular: Negative for chest pain and leg swelling. Gastrointestinal: Negative for abdominal pain, diarrhea, nausea and vomiting. Endocrine: Positive for polydipsia and polyuria. Genitourinary: Negative for decreased urine volume. Musculoskeletal: Negative for gait problem. Skin: Negative for pallor and rash. Neurological: Negative for tremors, facial asymmetry and headaches. Psychiatric/Behavioral: Negative for agitation. All other systems reviewed and are negative.       Physical Exam   Physical Exam  Vitals and nursing note reviewed. Constitutional:       Comments: 60-year-old female, resting in bed, no distress   HENT:      Head: Normocephalic and atraumatic. Cardiovascular:      Rate and Rhythm: Normal rate and regular rhythm. Heart sounds: No murmur heard. No friction rub. No gallop. Pulmonary:      Effort: Pulmonary effort is normal.      Breath sounds: Examination of the right-middle field reveals wheezing. Examination of the left-middle field reveals wheezing. Examination of the right-lower field reveals wheezing. Examination of the left-lower field reveals wheezing. Wheezing present. Comments: Not hypoxic on room air  Abdominal:      Palpations: Abdomen is soft. Tenderness: There is no abdominal tenderness. Musculoskeletal:         General: Normal range of motion. Cervical back: Normal range of motion. Skin:     General: Skin is warm. Capillary Refill: Capillary refill takes less than 2 seconds. Neurological:      General: No focal deficit present. Mental Status: She is alert.    Psychiatric:         Mood and Affect: Mood normal.         Diagnostic Study Results     Labs -     Recent Results (from the past 12 hour(s))   GLUCOSE, POC    Collection Time: 07/28/21 10:52 AM   Result Value Ref Range    Glucose (POC) 595 (H) 65 - 117 mg/dL    Performed by Gerardo Dixon    EKG, 12 LEAD, INITIAL    Collection Time: 07/28/21 11:04 AM   Result Value Ref Range    Ventricular Rate 93 BPM    Atrial Rate 93 BPM    P-R Interval 176 ms    QRS Duration 70 ms    Q-T Interval 352 ms    QTC Calculation (Bezet) 437 ms    Calculated P Axis 73 degrees    Calculated R Axis -10 degrees    Calculated T Axis 52 degrees    Diagnosis       Normal sinus rhythm    Nonspecific ST abnormality  Confirmed by Thu Jackson ([id number]) on 7/28/2021 3:47:38 PM     BLOOD GAS,CHEM8,LACTIC ACID POC    Collection Time: 07/28/21 11:35 AM   Result Value Ref Range    Calcium, ionized (POC) 1.08 (L) 1.12 - 1.32 mmol/L BICARBONATE 26 mmol/L    Base deficit (POC) 0.9 mmol/L    Sample source VENOUS BLOOD      CO2, POC 26 (H) 19 - 24 MMOL/L    Sodium,  (L) 136 - 145 MMOL/L    Potassium, POC 4.9 3.5 - 5.5 MMOL/L    Chloride, POC 93 (L) 100 - 108 MMOL/L    Glucose,  (HH) 74 - 106 MG/DL    Creatinine, POC 1.5 (H) 0.6 - 1.3 MG/DL    Critical value read back BALTZ     pH, venous (POC) 7.34 7.32 - 7.42      pCO2, venous (POC) 47.7 41 - 51 MMHG    pO2, venous (POC) 21 (L) 25 - 40 mmHg   CBC WITH AUTOMATED DIFF    Collection Time: 07/28/21 11:36 AM   Result Value Ref Range    WBC 8.5 3.6 - 11.0 K/uL    RBC 4.56 3.80 - 5.20 M/uL    HGB 13.3 11.5 - 16.0 g/dL    HCT 41.1 35.0 - 47.0 %    MCV 90.1 80.0 - 99.0 FL    MCH 29.2 26.0 - 34.0 PG    MCHC 32.4 30.0 - 36.5 g/dL    RDW 15.8 (H) 11.5 - 14.5 %    PLATELET 819 598 - 076 K/uL    MPV 10.4 8.9 - 12.9 FL    NRBC 0.0 0  WBC    ABSOLUTE NRBC 0.00 0.00 - 0.01 K/uL    NEUTROPHILS 74 32 - 75 %    LYMPHOCYTES 22 12 - 49 %    MONOCYTES 4 (L) 5 - 13 %    EOSINOPHILS 0 0 - 7 %    BASOPHILS 0 0 - 1 %    IMMATURE GRANULOCYTES 0 0.0 - 0.5 %    ABS. NEUTROPHILS 6.2 1.8 - 8.0 K/UL    ABS. LYMPHOCYTES 1.8 0.8 - 3.5 K/UL    ABS. MONOCYTES 0.4 0.0 - 1.0 K/UL    ABS. EOSINOPHILS 0.0 0.0 - 0.4 K/UL    ABS. BASOPHILS 0.0 0.0 - 0.1 K/UL    ABS. IMM.  GRANS. 0.0 0.00 - 0.04 K/UL    DF AUTOMATED     METABOLIC PANEL, COMPREHENSIVE    Collection Time: 07/28/21 11:36 AM   Result Value Ref Range    Sodium 128 (L) 136 - 145 mmol/L    Potassium 4.7 3.5 - 5.1 mmol/L    Chloride 90 (L) 97 - 108 mmol/L    CO2 23 21 - 32 mmol/L    Anion gap 15 5 - 15 mmol/L    Glucose 568 (H) 65 - 100 mg/dL    BUN 32 (H) 6 - 20 MG/DL    Creatinine 1.73 (H) 0.55 - 1.02 MG/DL    BUN/Creatinine ratio 18 12 - 20      GFR est AA 38 (L) >60 ml/min/1.73m2    GFR est non-AA 31 (L) >60 ml/min/1.73m2    Calcium 9.0 8.5 - 10.1 MG/DL    Bilirubin, total 0.7 0.2 - 1.0 MG/DL    ALT (SGPT) 20 12 - 78 U/L    AST (SGOT) 15 15 - 37 U/L Alk. phosphatase 107 45 - 117 U/L    Protein, total 7.7 6.4 - 8.2 g/dL    Albumin 4.1 3.5 - 5.0 g/dL    Globulin 3.6 2.0 - 4.0 g/dL    A-G Ratio 1.1 1.1 - 2.2     MAGNESIUM    Collection Time: 07/28/21 11:36 AM   Result Value Ref Range    Magnesium 1.7 1.6 - 2.4 mg/dL   TROPONIN I    Collection Time: 07/28/21 11:36 AM   Result Value Ref Range    Troponin-I, Qt. <0.05 <0.05 ng/mL   URINALYSIS W/ REFLEX CULTURE    Collection Time: 07/28/21 11:54 AM    Specimen: Urine   Result Value Ref Range    Color YELLOW/STRAW      Appearance CLOUDY (A) CLEAR      Specific gravity 1.014 1.003 - 1.030      pH (UA) 5.5 5.0 - 8.0      Protein Negative NEG mg/dL    Glucose 500 (A) NEG mg/dL    Ketone TRACE (A) NEG mg/dL    Bilirubin Negative NEG      Blood Negative NEG      Urobilinogen 0.2 0.2 - 1.0 EU/dL    Nitrites Negative NEG      Leukocyte Esterase MODERATE (A) NEG      WBC 10-20 0 - 4 /hpf    RBC 5-10 0 - 5 /hpf    Epithelial cells MODERATE (A) FEW /lpf    Bacteria 1+ (A) NEG /hpf    UA:UC IF INDICATED URINE CULTURE ORDERED (A) CNI      Hyaline cast 2-5 0 - 5 /lpf   HCG URINE, QL. - POC    Collection Time: 07/28/21 12:02 PM   Result Value Ref Range    Pregnancy test,urine (POC) Negative NEG     GLUCOSE, POC    Collection Time: 07/28/21  2:01 PM   Result Value Ref Range    Glucose (POC) 573 (H) 65 - 117 mg/dL    Performed by Darling Singer    Collection Time: 07/28/21  2:04 PM   Result Value Ref Range    Glucose 573 mg/dL    Insulin order 10.3 units/hour    Insulin adminstered 10.3 units/hour    Multiplier 0.020     Low target 150 mg/dL    High target 250 mg/dL    D50 order 0.0 ml    D50 administered 0.00 ml    Minutes until next BG 60 min    Order initials ME     Administered initials ED     GLSCOM Comments     GLUCOSE, POC    Collection Time: 07/28/21  2:59 PM   Result Value Ref Range    Glucose (POC) 503 (H) 65 - 117 mg/dL    Performed by Alisa Shaffer RN    GLUCOSTABILIZER    Collection Time: 07/28/21 3:02 PM   Result Value Ref Range    Glucose 503 mg/dL    Insulin order 13.3 units/hour    Insulin adminstered 13.3 units/hour    Multiplier 0.030     Low target 150 mg/dL    High target 250 mg/dL    D50 order 0.0 ml    D50 administered 0.00 ml    Minutes until next BG 60 min    Order initials ME     Administered initials ED     GLSCOM Comments     GLUCOSE, POC    Collection Time: 07/28/21  4:02 PM   Result Value Ref Range    Glucose (POC) 391 (H) 65 - 117 mg/dL    Performed by Chacho Soto RN    GLUCOSTABILIZER    Collection Time: 07/28/21  4:04 PM   Result Value Ref Range    Glucose 391 mg/dL    Insulin order 9.9 units/hour    Insulin adminstered 9.9 units/hour    Multiplier 0.030     Low target 150 mg/dL    High target 250 mg/dL    D50 order 0.0 ml    D50 administered 0.00 ml    Minutes until next BG 60 min    Order initials ME     Administered initials ED     GLSCOM Comments     MAGNESIUM    Collection Time: 07/28/21  4:23 PM   Result Value Ref Range    Magnesium 1.5 (L) 1.6 - 2.4 mg/dL   PHOSPHORUS    Collection Time: 07/28/21  4:23 PM   Result Value Ref Range    Phosphorus 2.0 (L) 2.6 - 4.7 MG/DL   METABOLIC PANEL, BASIC    Collection Time: 07/28/21  4:23 PM   Result Value Ref Range    Sodium 134 (L) 136 - 145 mmol/L    Potassium 3.5 3.5 - 5.1 mmol/L    Chloride 100 97 - 108 mmol/L    CO2 24 21 - 32 mmol/L    Anion gap 10 5 - 15 mmol/L    Glucose 342 (H) 65 - 100 mg/dL    BUN 28 (H) 6 - 20 MG/DL    Creatinine 1.72 (H) 0.55 - 1.02 MG/DL    BUN/Creatinine ratio 16 12 - 20      GFR est AA 38 (L) >60 ml/min/1.73m2    GFR est non-AA 31 (L) >60 ml/min/1.73m2    Calcium 8.0 (L) 8.5 - 10.1 MG/DL   HEMOGLOBIN    Collection Time: 07/28/21  4:26 PM   Result Value Ref Range    HGB 10.7 (L) 11.5 - 16.0 g/dL   GLUCOSE, POC    Collection Time: 07/28/21  5:07 PM   Result Value Ref Range    Glucose (POC) 294 (H) 65 - 117 mg/dL    Performed by Cornelio    Collection Time: 07/28/21  5:07 PM   Result Value Ref Range    Glucose 294 mg/dL    Insulin order 9.4 units/hour    Insulin adminstered 9.4 units/hour    Multiplier 0.040     Low target 150 mg/dL    High target 250 mg/dL    D50 order 0.0 ml    D50 administered 0.00 ml    Minutes until next BG 60 min    Order initials SPH     Administered initials [facility]     GLSCOM Comments         Radiologic Studies -   XR CHEST PORT   Final Result   Normal chest.        CT Results  (Last 48 hours)    None        CXR Results  (Last 48 hours)               07/28/21 1205  XR CHEST PORT Final result    Impression:  Normal chest.       Narrative:  EXAM: XR CHEST PORT       INDICATION: shortness of breath       COMPARISON: 1/18/2021       FINDINGS: A portable AP radiograph of the chest was obtained at 1144 hours. The   patient is on a cardiac monitor. The lungs are clear. The cardiac and   mediastinal contours and pulmonary vascularity are normal.  The bones and soft   tissues are grossly within normal limits. Medical Decision Making   I am the first provider for this patient. I reviewed the vital signs, available nursing notes, past medical history, past surgical history, family history and social history. Vital Signs-Reviewed the patient's vital signs. Patient Vitals for the past 12 hrs:   Temp Pulse Resp BP SpO2   07/28/21 1642 98.2 °F (36.8 °C) 82 16 121/70 95 %   07/28/21 1615 -- 87 18 (!) 118/53 96 %   07/28/21 1600 -- 83 30 119/63 97 %   07/28/21 1116 -- -- -- -- 100 %   07/28/21 1054 98.7 °F (37.1 °C) 91 20 131/75 100 %     Records Reviewed: Nursing Notes and Old Medical Records    Provider Notes (Medical Decision Making):     60-year-old female presents emergency department with hyperglycemia and \"not feeling well. \"  Vitals are stable, she is afebrile. Will check broad labs including VBG and Chem-8, rule out DKA with urine. Suspect patient's polydipsia and polyuria are secondary to hyperglycemia. We will check chest x-ray and urinalysis. Reassess. ED Course:   Initial assessment performed. The patients presenting problems have been discussed, and they are in agreement with the care plan formulated and outlined with them. I have encouraged them to ask questions as they arise throughout their visit. ED Course as of Jul 28 1732 Wed Jul 28, 2021   1114 Preliminary EKG interpreted by me. Shows normal sinus rhythm with a HR of 93. No ST elevations or depressions concerning for ischemia. Normal intervals. [MB]   [address] [facility] show no leukocytosis. CMP shows mild DEBORAH likely prerenal, sodium appropriate for patient's glucose. There is no DKA but anion gap is borderline elevated with trace ketones in the urine. Urinalysis does support infection with polyuria will treat. Overall suspect this is early DKA less likely HH NK, will start insulin drip and continue to supplement fluids. Will require admission for endocrinology and discharge planning.    [MB]      ED Course User Index  [MB] Anaya Chavarria MD     Critical Care Time:   CRITICAL CARE NOTE :    11:28 AM    IMPENDING DETERIORATION -Metabolic  ASSOCIATED RISK FACTORS - Metabolic changes and Dehydration  MANAGEMENT- Bedside Assessment  INTERPRETATION -  Xrays and labs  INTERVENTIONS - hemodynamic mngmt and Metobolic interventions  CASE REVIEW - Hospitalist/Intensivist and Nursing  TREATMENT RESPONSE -Stable  PERFORMED BY - Self    NOTES   :  I have spent 35 minutes of critical care time involved in lab review, consultations with specialist, family decision- making, bedside attention and documentation. This time excludes time spent in any separate billed procedures. During this entire length of time I was immediately available to the patient . Dank Du MD      Disposition:    Admitted    Diagnosis     Clinical Impression:   1. Hyperglycemia    2. DEBORAH (acute kidney injury) ([facility]    3.  Acute cystitis without hematuria        Attestations:    Dank Du MD    Please note that this dictation was completed with [employer], the computer voice recognition software. Quite often unanticipated grammatical, syntax, homophones, and other interpretive errors are inadvertently transcribed by the computer software. Please disregard these errors. Please excuse any errors that have escaped final proofreading. Thank you.

## 2021-07-28 NOTE — H&P
Hospitalist Admission Note    NAME: Tung Saba   :  1970   MRN:  [id number]     Date/Time:  2021 2:22 PM    Patient PCP: Peter Edmond MD  ________________________________________________________________________    My assessment of this patient's clinical condition and my plan of care is as follows. Assessment / Plan:  Diabetes type 2 hyperglycemia came in with DKA   Anion Gap of 15 , mild metabolic acidosis   DEBORAH likely from dehydration and DKA   Admit to stepdown   Recent A1c is  9.6 in April , will send new   Currently on insuline drip , IVF NS if BG above 250 , otherwise switch to 1/2NSD5  To be switched to long acting insuline and SSI soon   NPO for now   BMP Q 4 hours   Watch K and Phos and replace   COPD   Patient use home oxygen as needed specially on ambulation. Diastolic CHF with varicose veins s/p ligation and stripping  On Bumex twice daily  Echo 2020 shows EF 55 to 60%. Hypertension  Anxiety disorder  Diabetic neuropathy  CAD  Insomnia  Continue the home medications. Arthritis of the knees on narcotic treatment  Continue fentanyl patch, diclofenac gel for the knee. Tobacco abuse  Smokes 1 and half pack a day, on nicotine patch  Hx of endometriosis , some times has pain   Code Status: Full code  Surrogate Decision Maker:   DVT Prophylaxis: Lovenox  GI Prophylaxis: not indicated  Baseline: Ambulatory with assistance at home        Subjective:   CHIEF COMPLAINT: hyperglycemia and dehydration     HISTORY OF PRESENT ILLNESS:     Brinda Lester is a 49 y.o.  female who presents with dehydration and hyperglycemia . Patient said that her insuline pump has not been working since yesterday and she has to inject insuline herself feeling dehydrated and looking at high glu level. Blood sugar level ranged from 600 to 800 prior to admission   Patient takes up to 34 U insuline thru the pulp in 24 hours. came in to hospital when she cant manage it more and feels dehydrated. Otherwise denied urinary complaints   Denied fever or chills   Denied abdominal  Pain   She said she has endometriosis pain here and there     We were asked to admit for work up and evaluation of the above problems.      Past Medical History:   Diagnosis Date    Achilles tendon rupture     along with torn right patellar/femoral ligament    Arthritis     rt. foot    Chronic kidney disease     Chronic pain     abdominal pain    Diabetes (HCC)     IDDM on insulin pump and sensor    DM type 1 (diabetes mellitus, type 1) ([facility] [address]     age 25    Endometriosis     s/p ex-lap 4x    Gastric ulcer     GERD (gastroesophageal reflux disease)     Herpes     Other and unspecified hyperlipidemia     Panic attacks     Psychiatric disorder     anxiety, panic attacks    PTSD (post-traumatic stress disorder)     Unspecified essential hypertension         Past Surgical History:   Procedure Laterality Date    HX COLONOSCOPY      HX GYN      2 d&c's    HX GYN      laparoscopies x5 for endometriosis    HX GYN      mirena in place    HX ORTHOPAEDIC  2002    achiles tendon repair,talus repair    HX ORTHOPAEDIC      injections x2 to right shoulder    HX ORTHOPAEDIC Left 02/07/2019    3rd trigger finger release    DC CARDIAC SURG PROCEDURE UNLIST         Social History     Tobacco Use    Smoking status: Current Every Day Smoker     Packs/day: 1.00     Years: 28.00     Pack years: 28.00     Types: Cigarettes    Smokeless tobacco: Current User   Substance Use Topics    Alcohol use: Not Currently     Comment: a beer every few months        Family History   Problem Relation Age of Onset    Diabetes Mother         diet controlled    Diabetes Father         R foot amupation    Liver Disease Father     Heart Disease Paternal Uncle         MI in his 46s    Stroke Neg Hx      Allergies   Allergen Reactions    Metolazone Other (comments)     Caused hyponatremia    Zocor [Simvastatin] Myalgia    Lisinopril Cough    Medrol [Methylprednisolone] Other (comments)     Caused severe hyperglycemia with the medrol pack        Prior to Admission medications    Medication Sig Start Date End Date Taking? Authorizing Provider   glucagon (Glucagon Emergency Kit, human,) 1 mg injection USE AS DIRECTED 6/2/21   David Vasquez MD   OXYGEN-AIR DELIVERY SYSTEMS by Does Not Apply route. Provider, Historical   FreeStyle Qiana 14 Day Sensor kit USE AS DIRECTED EVERY 14 DAYS 3/5/21   David Vasquez MD   carvediloL (COREG) 6.25 mg tablet TAKE ONE TABLET BY MOUTH DAILY 2/27/21   David Vasquez MD   gabapentin (NEURONTIN) 300 mg capsule TAKE TWO CAPSULES BY MOUTH THREE TIMES A DAY 2/13/21   David Vasquez MD   ascorbic acid, vitamin C, (Vitamin C) 250 mg chew Take 250 mg by mouth two (2) times a day. 1/22/21   Varun Buckley MD   zinc sulfate (Zinc-220) 220 (50) mg capsule Take 1 Cap by mouth daily. 1/22/21   Varun Buckley MD   ARIPiprazole (Abilify) 30 mg tablet Take 30 mg by mouth daily. Provider, Historical   brexpiprazole (Rexulti) 0.5 mg tab tablet Take 1 mg by mouth two (2) times a day. Provider, Historical   busPIRone (BUSPAR) 10 mg tablet Take 20 mg by mouth two (2) times a day. Provider, Historical   fentaNYL (DURAGESIC) 25 mcg/hr PATCH 1 Patch by TransDERmal route every seventy-two (72) hours. Provider, Historical   ALPRAZolam (XANAX) 1 mg tablet Take 1 mg by mouth three (3) times daily as needed. 9/25/20   Provider, Historical   insulin aspart U-100 (NovoLOG U-100 Insulin aspart) 100 unit/mL injection USE AS DIRECTED FOR INSULIN PUMP.  UNITS PER DAY.   DX E11.65  Patient taking differently: No sig reported 9/24/20   David Vasquez MD   promethazine (PHENERGAN) 25 mg tablet TAKE ONE TABLET BY MOUTH EVERY 6 HOURS AS NEEDED FOR NAUSEA 9/10/20   Lino Gonsalez III, DO   fluticasone propionate (FLONASE) 50 mcg/actuation nasal spray 2 Sprays by Both Nostrils route daily as needed for Rhinitis. 9/10/20   Emani CARMEN III, DO   clopidogreL (Plavix) 75 mg tab Take 1 Tab by mouth daily. 8/15/20   Claudine Dejesus MD   bumetanide (BUMEX) 1 mg tablet Take 1 Tab by mouth two (2) times a day. 20   Claudine Dejesus MD   carvedilol (COREG) 6.25 mg tablet Take 1 Tab by mouth daily. Delete the 12.5 mg dose from profile 19   Claudine Dejesus MD   atorvastatin (LIPITOR) 40 mg tablet TAKE ONE TABLET BY MOUTH DAILY 19   Emani CARMEN III, DO   esomeprazole (NEXIUM) 40 mg capsule TAKE ONE CAPSULE BY MOUTH DAILY 10/21/19   Emani CARMEN III, DO   insulin pump (PATIENT SUPPLIED) misc by SubCUTAneous route as needed. Insulin Type: Novolog  Basal Dose:  midnight-midnight: 3.4 units/hr    Target B- mg/dL  Corrective Dose: 1 unit per 50 mg/dL over 150 mg/dL  Insulin: Carb ratio: 1 units: 15 grams  Last time insulin pump supplies changed: 21  Last time insulin pump refilled: 21    Provider, Historical   citalopram (CELEXA) 20 mg tablet Take 40 mg by mouth daily. 19   Provider, Historical   traZODone (DESYREL) 50 mg tablet Take 100 mg by mouth nightly. 19   Provider, Historical   levonorgestrel (MIRENA) 20 mcg/24 hr (5 years) IUD 1 Device by IntraUTERine route once. Provider, Historical   fentaNYL (DURAGESIC) 100 mcg/hr PATCH 1 Patch by TransDERmal route every seventy-two (72) hours. 18   Provider, Historical       REVIEW OF SYSTEMS:     I am not able to complete the review of systems because:    The patient is intubated and sedated    The patient has altered mental status due to his acute medical problems    The patient has baseline aphasia from prior stroke(s)    The patient has baseline dementia and is not reliable historian    The patient is in acute medical distress and unable to provide information           Total of 12 systems reviewed as follows:       POSITIVE= underlined text  Negative = text not underlined  General:  fever, chills, sweats, generalized weakness, weight loss/gain,      loss of appetite   Eyes:    blurred vision, eye pain, loss of vision, double vision  ENT:    rhinorrhea, pharyngitis   Respiratory:   cough, sputum production, SOB, HERNÁNDEZ, wheezing, pleuritic pain   Cardiology:   chest pain, palpitations, orthopnea, PND, edema, syncope   Gastrointestinal:  abdominal pain , N/V, diarrhea, dysphagia, constipation, bleeding   Genitourinary:  frequency, urgency, dysuria, hematuria, incontinence   Muskuloskeletal :  arthralgia, myalgia, back pain  Hematology:  easy bruising, nose or gum bleeding, lymphadenopathy   Dermatological: rash, ulceration, pruritis, color change / jaundice  Endocrine:   hot flashes or polydipsia   Neurological:  headache, dizziness, confusion, focal weakness, paresthesia,     Speech difficulties, memory loss, gait difficulty  Psychological: Feelings of anxiety, depression, agitation    Objective:   VITALS:    Visit Vitals  /75 (BP 1 Location: Left upper arm, BP Patient Position: At rest)   Pulse 91   Temp 98.7 °F (37.1 °C)   Resp 20   Ht 5' 4\" (1.626 m)   Wt 92.5 kg (203 lb 14.8 oz)   SpO2 100%   BMI 35.00 kg/m²       PHYSICAL EXAM:    General:    Alert, cooperative, no distress, appears stated age. HEENT: Atraumatic, anicteric sclerae, pink conjunctivae     No oral ulcers, mucosa moist, throat clear, dentition fair  Neck:  Supple, symmetrical,  thyroid: non tender  Lungs:   Clear to auscultation bilaterally. No Wheezing or Rhonchi. No rales. Chest wall:  No tenderness  No Accessory muscle use. Heart:   Regular  rhythm,  No  murmur   No edema  Abdomen:   Soft, non-tender. Not distended. Bowel sounds normal insuline pump in place   Extremities: No cyanosis. No clubbing,      Skin turgor normal, Capillary refill normal, Radial dial pulse 2+  Skin:     Not pale. Not Jaundiced  No rashes   Psych:  Good insight. Not depressed.   Not anxious or agitated. Neurologic: EOMs intact. No facial asymmetry. No aphasia or slurred speech. Symmetrical strength, Sensation grossly intact. Alert and oriented X 4.     _______________________________________________________________________  Care Plan discussed with:    Comments   Patient y    Family      RN y    Care Manager                    Consultant:      _______________________________________________________________________  Expected  Disposition:   Home with Family y   HH/PT/OT/RN    SNF/LTC    CHIDI    ________________________________________________________________________  TOTAL TIME: 64  Minutes    Critical Care Provided     Minutes non procedure based      Comments    y Reviewed previous records   >50% of visit spent in counseling and coordination of care  Discussion with patient and/or family and questions answered       ________________________________________________________________________  Signed: Cullen Renner MD    Procedures: see electronic medical records for all procedures/Xrays and details which were not copied into this note but were reviewed prior to creation of Plan.     LAB DATA REVIEWED:    Recent Results (from the past 24 hour(s))   GLUCOSE, POC    Collection Time: 07/28/21 10:52 AM   Result Value Ref Range    Glucose (POC) 595 (H) 65 - 117 mg/dL    Performed by Shayan VYAS, 12 LEAD, INITIAL    Collection Time: 07/28/21 11:04 AM   Result Value Ref Range    Ventricular Rate 93 BPM    Atrial Rate 93 BPM    P-R Interval 176 ms    QRS Duration 70 ms    Q-T Interval 352 ms    QTC Calculation (Bezet) 437 ms    Calculated P Axis 73 degrees    Calculated R Axis -10 degrees    Calculated T Axis 52 degrees    Diagnosis       Normal sinus rhythm  Possible Left atrial enlargement  Septal infarct (cited on or before 18-JAN-2021)  When compared with ECG of 18-JAN-2021 13:35,  Nonspecific T wave abnormality no longer evident in Inferior leads  Nonspecific T wave abnormality no longer evident in Anterior leads     BLOOD GAS,CHEM8,LACTIC ACID POC    Collection Time: 07/28/21 11:35 AM   Result Value Ref Range    Calcium, ionized (POC) 1.08 (L) 1.12 - 1.32 mmol/L    BICARBONATE 26 mmol/L    Base deficit (POC) 0.9 mmol/L    Sample source VENOUS BLOOD      CO2, POC 26 (H) 19 - 24 MMOL/L    Sodium,  (L) 136 - 145 MMOL/L    Potassium, POC 4.9 3.5 - 5.5 MMOL/L    Chloride, POC 93 (L) 100 - 108 MMOL/L    Glucose,  (HH) 74 - 106 MG/DL    Creatinine, POC 1.5 (H) 0.6 - 1.3 MG/DL    Critical value read back BALTZ     pH, venous (POC) 7.34 7.32 - 7.42      pCO2, venous (POC) 47.7 41 - 51 MMHG    pO2, venous (POC) 21 (L) 25 - 40 mmHg   CBC WITH AUTOMATED DIFF    Collection Time: 07/28/21 11:36 AM   Result Value Ref Range    WBC 8.5 3.6 - 11.0 K/uL    RBC 4.56 3.80 - 5.20 M/uL    HGB 13.3 11.5 - 16.0 g/dL    HCT 41.1 35.0 - 47.0 %    MCV 90.1 80.0 - 99.0 FL    MCH 29.2 26.0 - 34.0 PG    MCHC 32.4 30.0 - 36.5 g/dL    RDW 15.8 (H) 11.5 - 14.5 %    PLATELET 787 957 - 794 K/uL    MPV 10.4 8.9 - 12.9 FL    NRBC 0.0 0  WBC    ABSOLUTE NRBC 0.00 0.00 - 0.01 K/uL    NEUTROPHILS 74 32 - 75 %    LYMPHOCYTES 22 12 - 49 %    MONOCYTES 4 (L) 5 - 13 %    EOSINOPHILS 0 0 - 7 %    BASOPHILS 0 0 - 1 %    IMMATURE GRANULOCYTES 0 0.0 - 0.5 %    ABS. NEUTROPHILS 6.2 1.8 - 8.0 K/UL    ABS. LYMPHOCYTES 1.8 0.8 - 3.5 K/UL    ABS. MONOCYTES 0.4 0.0 - 1.0 K/UL    ABS. EOSINOPHILS 0.0 0.0 - 0.4 K/UL    ABS. BASOPHILS 0.0 0.0 - 0.1 K/UL    ABS. IMM.  GRANS. 0.0 0.00 - 0.04 K/UL    DF AUTOMATED     METABOLIC PANEL, COMPREHENSIVE    Collection Time: 07/28/21 11:36 AM   Result Value Ref Range    Sodium 128 (L) 136 - 145 mmol/L    Potassium 4.7 3.5 - 5.1 mmol/L    Chloride 90 (L) 97 - 108 mmol/L    CO2 23 21 - 32 mmol/L    Anion gap 15 5 - 15 mmol/L    Glucose 568 (H) 65 - 100 mg/dL    BUN 32 (H) 6 - 20 MG/DL    Creatinine 1.73 (H) 0.55 - 1.02 MG/DL    BUN/Creatinine ratio 18 12 - 20      GFR est AA 38 (L) >60 ml/min/1.73m2    GFR est non-AA 31 (L) >60 ml/min/1.73m2    Calcium 9.0 8.5 - 10.1 MG/DL    Bilirubin, total 0.7 0.2 - 1.0 MG/DL    ALT (SGPT) 20 12 - 78 U/L    AST (SGOT) 15 15 - 37 U/L    Alk.  phosphatase 107 45 - 117 U/L    Protein, total 7.7 6.4 - 8.2 g/dL    Albumin 4.1 3.5 - 5.0 g/dL    Globulin 3.6 2.0 - 4.0 g/dL    A-G Ratio 1.1 1.1 - 2.2     MAGNESIUM    Collection Time: 07/28/21 11:36 AM   Result Value Ref Range    Magnesium 1.7 1.6 - 2.4 mg/dL   TROPONIN I    Collection Time: 07/28/21 11:36 AM   Result Value Ref Range    Troponin-I, Qt. <0.05 <0.05 ng/mL   URINALYSIS W/ REFLEX CULTURE    Collection Time: 07/28/21 11:54 AM    Specimen: Urine   Result Value Ref Range    Color YELLOW/STRAW      Appearance CLOUDY (A) CLEAR      Specific gravity 1.014 1.003 - 1.030      pH (UA) 5.5 5.0 - 8.0      Protein Negative NEG mg/dL    Glucose 500 (A) NEG mg/dL    Ketone TRACE (A) NEG mg/dL    Bilirubin Negative NEG      Blood Negative NEG      Urobilinogen 0.2 0.2 - 1.0 EU/dL    Nitrites Negative NEG      Leukocyte Esterase MODERATE (A) NEG      WBC 10-20 0 - 4 /hpf    RBC 5-10 0 - 5 /hpf    Epithelial cells MODERATE (A) FEW /lpf    Bacteria 1+ (A) NEG /hpf    UA:UC IF INDICATED URINE CULTURE ORDERED (A) CNI      Hyaline cast 2-5 0 - 5 /lpf   HCG URINE, QL. - POC    Collection Time: 07/28/21 12:02 PM   Result Value Ref Range    Pregnancy test,urine (POC) Negative NEG     GLUCOSE, POC    Collection Time: 07/28/21  2:01 PM   Result Value Ref Range    Glucose (POC) 573 (H) 65 - 117 mg/dL    Performed by James Vogel    Collection Time: 07/28/21  2:04 PM   Result Value Ref Range    Glucose 573 mg/dL    Insulin order 10.3 units/hour    Insulin adminstered 10.3 units/hour    Multiplier 0.020     Low target 150 mg/dL    High target 250 mg/dL    D50 order 0.0 ml    D50 administered 0.00 ml    Minutes until next BG 60 min    Order initials ME     Administered initials ED     GLSCOM Comments

## 2021-07-28 NOTE — PROGRESS NOTES
Problem: DKA: Day 1  Goal: Diagnostic Tests/Procedures, if Ordered  Outcome: Progressing Towards Goal  Goal: Nutrition/Diet  Outcome: Progressing Towards Goal  Goal: Medications  Outcome: Progressing Towards Goal  Goal: Respiratory  Outcome: Progressing Towards Goal  Goal: Treatments/Interventions/Procedures  Outcome: Progressing Towards Goal  Goal: Psychosocial  Outcome: Progressing Towards Goal 8

## 2021-07-28 NOTE — PROGRESS NOTES
Pharmacy Clarification of the Prior to Admission Medication Regimen Retrospective to the Admission Medication Reconciliation    The patient was interviewed regarding clarification of the prior to admission medication regimen. She was questioned regarding use of any other inhalers, topical products, over the counter medications, herbal medications, vitamin products or ophthalmic/nasal/otic medication use. Information Obtained From: query, patient, notes    Recommendations/Findings: The following amendments were made to the patient's active medication list on file at [facility]:     1) Additions:   Lysbeth Milligan   Aspirin   Voltaren  Oxycodone  Crestor      2) Removals:   Zinc  Vit C  Plavix    3) Changes:  Bumex  (Old regimen: 1 mg twice a day /New regimen: 2 mg twice a day)        4) Pertinent Pharmacy Findings:  PATIENT HAS INSULIN PUMP    Updated patients preferred outpatient pharmacy to: [facility] medication list was corrected to the following:     Prior to Admission Medications   Prescriptions Last Dose Informant Taking? ALPRAZolam (XANAX) 1 mg tablet 7/28/2021 at Unknown time Self Yes   Sig: Take 1 mg by mouth three (3) times daily as needed. ARIPiprazole (Abilify) 30 mg tablet 7/28/2021 at Unknown time Self Yes   Sig: Take 30 mg by mouth daily. aspirin delayed-release 81 mg tablet 7/28/2021 at Unknown time Self Yes   Sig: Take 81 mg by mouth daily. atorvastatin (LIPITOR) 40 mg tablet 7/28/2021 at Unknown time Self Yes   Sig: TAKE ONE TABLET BY MOUTH DAILY   brexpiprazole (Rexulti) 0.5 mg tab tablet 7/28/2021 at Unknown time Self Yes   Sig: Take 1 mg by mouth two (2) times a day. bumetanide (BUMEX) 2 mg tablet 7/27/2021 at Unknown time Self Yes   Sig: Take 2 mg by mouth two (2) times a day. busPIRone (BUSPAR) 10 mg tablet 7/28/2021 at Unknown time Self Yes   Sig: Take 20 mg by mouth two (2) times a day.    carvediloL (COREG) 6.25 mg tablet 7/28/2021 at Unknown time Self Yes   Sig: TAKE ONE TABLET BY MOUTH DAILY   citalopram (CELEXA) 20 mg tablet 2021 at Unknown time Self Yes   Sig: Take 40 mg by mouth daily. diclofenac (Voltaren) 1 % gel 2021 at Unknown time Self Yes   Sig: Apply 2 g to affected area four (4) times daily as needed for Pain. RT KNEE   elagolix (Orilissa) 150 mg tab 2021 at Unknown time Self Yes   Sig: Take 150 mg by mouth daily. esomeprazole (NEXIUM) 40 mg capsule 2021 at Unknown time Self Yes   Sig: TAKE ONE CAPSULE BY MOUTH DAILY   fentaNYL (DURAGESIC) 100 mcg/hr PATCH 2021 at Unknown time Self Yes   Si Patch by TransDERmal route every seventy-two (72) hours. fentaNYL (DURAGESIC) 25 mcg/hr PATCH 2021 at Unknown time Self Yes   Si Patch by TransDERmal route every seventy-two (72) hours. fluticasone propionate (FLONASE) 50 mcg/actuation nasal spray 2021 at Unknown time Self Yes   Si Sprays by Both Nostrils route daily as needed for Rhinitis.   gabapentin (NEURONTIN) 300 mg capsule 2021 at Unknown time Self Yes   Sig: TAKE TWO CAPSULES BY MOUTH THREE TIMES A DAY   glucagon (Glucagon Emergency Kit, human,) 1 mg injection 2021 at Unknown time Self Yes   Sig: USE AS DIRECTED   insulin aspart U-100 (NovoLOG U-100 Insulin aspart) 100 unit/mL injection  Self Yes   Sig: by SubCUTAneous route. USE AS INSTRUCTED PER INSULIN PUMP   insulin pump (PATIENT SUPPLIED) misc  Other No   Sig: by SubCUTAneous route as needed. Insulin Type: NOVOLOG  Basal Dose:  3423-8751: 1.55 units/hr  7753-8500: 1.65 units/hr    Target -8799: 100-130 mg/dL: 2933-6428: 130-150 mg/dL  Corrective Dose:  unit per  mg/dL over  mg/dL  Insulin: Carb ratio: 1 units: 12 grams  Last time insulin pump supplies changed: 21  Last time insulin pump refilled: 21   levonorgestrel (MIRENA) 20 mcg/24 hr (5 years) IUD  Self Yes   Si Device by IntraUTERine route once.    oxyCODONE IR (ROXICODONE) 5 mg immediate release tablet 2021 at Unknown time Self Yes   Sig: Take 5 mg by mouth every four (4) hours as needed for Pain. promethazine (PHENERGAN) 25 mg tablet  Self Yes   Sig: TAKE ONE TABLET BY MOUTH EVERY 6 HOURS AS NEEDED FOR NAUSEA   rosuvastatin (Crestor) 40 mg tablet 7/28/2021 at Unknown time Self Yes   Sig: Take 40 mg by mouth daily. traZODone (DESYREL) 50 mg tablet 7/27/2021 at Unknown time Self Yes   Sig: Take 100 mg by mouth nightly.       Facility-Administered Medications: None        Thank you,  Ro Tomlin, HT  Medication History Pharmacy Technician

## 2021-07-29 ENCOUNTER — TELEPHONE (OUTPATIENT)
Dept: ENDOCRINOLOGY | Age: 51
End: 2021-07-29

## 2021-07-29 LAB
ADMINISTERED INITIALS, ADMINIT: NORMAL
ALBUMIN SERPL-MCNC: 4.4 G/DL (ref 3.8–4.8)
ALBUMIN/GLOB SERPL: 1.6 {RATIO} (ref 1.2–2.2)
ALP SERPL-CCNC: 109 IU/L (ref 48–121)
ALT SERPL-CCNC: 12 IU/L (ref 0–32)
ANION GAP SERPL CALC-SCNC: 6 MMOL/L (ref 5–15)
ANION GAP SERPL CALC-SCNC: 9 MMOL/L (ref 5–15)
AST SERPL-CCNC: 19 IU/L (ref 0–40)
BACTERIA SPEC CULT: NORMAL
BILIRUB SERPL-MCNC: 0.5 MG/DL (ref 0–1.2)
BUN SERPL-MCNC: 23 MG/DL (ref 6–20)
BUN SERPL-MCNC: 25 MG/DL (ref 6–20)
BUN SERPL-MCNC: 28 MG/DL (ref 6–24)
BUN/CREAT SERPL: 18 (ref 9–23)
BUN/CREAT SERPL: 19 (ref 12–20)
BUN/CREAT SERPL: 21 (ref 12–20)
CALCIUM SERPL-MCNC: 7.8 MG/DL (ref 8.5–10.1)
CALCIUM SERPL-MCNC: 8.1 MG/DL (ref 8.5–10.1)
CALCIUM SERPL-MCNC: 9.6 MG/DL (ref 8.7–10.2)
CC UR VC: NORMAL
CHLORIDE SERPL-SCNC: 101 MMOL/L (ref 97–108)
CHLORIDE SERPL-SCNC: 104 MMOL/L (ref 97–108)
CHLORIDE SERPL-SCNC: 89 MMOL/L (ref 96–106)
CO2 SERPL-SCNC: 22 MMOL/L (ref 20–29)
CO2 SERPL-SCNC: 24 MMOL/L (ref 21–32)
CO2 SERPL-SCNC: 26 MMOL/L (ref 21–32)
CREAT SERPL-MCNC: 1.19 MG/DL (ref 0.55–1.02)
CREAT SERPL-MCNC: 1.24 MG/DL (ref 0.55–1.02)
CREAT SERPL-MCNC: 1.57 MG/DL (ref 0.57–1)
D50 ADMINISTERED, D50ADM: 0 ML
D50 ORDER, D50ORD: 0 ML
EST. AVERAGE GLUCOSE BLD GHB EST-MCNC: 214 MG/DL
GLOBULIN SER CALC-MCNC: 2.7 G/DL (ref 1.5–4.5)
GLSCOM COMMENTS: NORMAL
GLUCOSE BLD STRIP.AUTO-MCNC: 122 MG/DL (ref 65–117)
GLUCOSE BLD STRIP.AUTO-MCNC: 137 MG/DL (ref 65–117)
GLUCOSE BLD STRIP.AUTO-MCNC: 165 MG/DL (ref 65–117)
GLUCOSE BLD STRIP.AUTO-MCNC: 173 MG/DL (ref 65–117)
GLUCOSE BLD STRIP.AUTO-MCNC: 184 MG/DL (ref 65–117)
GLUCOSE BLD STRIP.AUTO-MCNC: 218 MG/DL (ref 65–117)
GLUCOSE BLD STRIP.AUTO-MCNC: 222 MG/DL (ref 65–117)
GLUCOSE BLD STRIP.AUTO-MCNC: 256 MG/DL (ref 65–117)
GLUCOSE BLD STRIP.AUTO-MCNC: 258 MG/DL (ref 65–117)
GLUCOSE BLD STRIP.AUTO-MCNC: 263 MG/DL (ref 65–117)
GLUCOSE BLD STRIP.AUTO-MCNC: 296 MG/DL (ref 65–117)
GLUCOSE BLD STRIP.AUTO-MCNC: 310 MG/DL (ref 65–117)
GLUCOSE SERPL-MCNC: 129 MG/DL (ref 65–100)
GLUCOSE SERPL-MCNC: 285 MG/DL (ref 65–100)
GLUCOSE SERPL-MCNC: 466 MG/DL (ref 65–99)
GLUCOSE, GLC: 137 MG/DL
GLUCOSE, GLC: 173 MG/DL
GLUCOSE, GLC: 184 MG/DL
GLUCOSE, GLC: 218 MG/DL
GLUCOSE, GLC: 222 MG/DL
GLUCOSE, GLC: 256 MG/DL
GLUCOSE, GLC: 258 MG/DL
GLUCOSE, GLC: 296 MG/DL
GLUCOSE, GLC: 310 MG/DL
HBA1C MFR BLD: 9.1 % (ref 4.8–5.6)
HIGH TARGET, HITG: 250 MG/DL
INSULIN ADMINSTERED, INSADM: 0 UNITS/HOUR
INSULIN ADMINSTERED, INSADM: 0.1 UNITS/HOUR
INSULIN ADMINSTERED, INSADM: 0.1 UNITS/HOUR
INSULIN ADMINSTERED, INSADM: 2 UNITS/HOUR
INSULIN ADMINSTERED, INSADM: 4.5 UNITS/HOUR
INSULIN ADMINSTERED, INSADM: 4.7 UNITS/HOUR
INSULIN ADMINSTERED, INSADM: 6.5 UNITS/HOUR
INSULIN ADMINSTERED, INSADM: 7.5 UNITS/HOUR
INSULIN ADMINSTERED, INSADM: 7.8 UNITS/HOUR
INSULIN ORDER, INSORD: 0 UNITS/HOUR
INSULIN ORDER, INSORD: 0.1 UNITS/HOUR
INSULIN ORDER, INSORD: 0.1 UNITS/HOUR
INSULIN ORDER, INSORD: 2 UNITS/HOUR
INSULIN ORDER, INSORD: 4.5 UNITS/HOUR
INSULIN ORDER, INSORD: 4.7 UNITS/HOUR
INSULIN ORDER, INSORD: 6.5 UNITS/HOUR
INSULIN ORDER, INSORD: 7.5 UNITS/HOUR
INSULIN ORDER, INSORD: 7.8 UNITS/HOUR
INTERPRETATION: NORMAL
LOW TARGET, LOT: 150 MG/DL
MAGNESIUM SERPL-MCNC: 1.4 MG/DL (ref 1.6–2.4)
MAGNESIUM SERPL-MCNC: 1.5 MG/DL (ref 1.6–2.4)
MINUTES UNTIL NEXT BG, NBG: 60 MIN
MULTIPLIER, MUL: 0
MULTIPLIER, MUL: 0.01
MULTIPLIER, MUL: 0.02
MULTIPLIER, MUL: 0.03
MULTIPLIER, MUL: 0.04
ORDER INITIALS, ORDINIT: NORMAL
POTASSIUM SERPL-SCNC: 3.9 MMOL/L (ref 3.5–5.1)
POTASSIUM SERPL-SCNC: 4.3 MMOL/L (ref 3.5–5.1)
POTASSIUM SERPL-SCNC: 5.2 MMOL/L (ref 3.5–5.2)
PROT SERPL-MCNC: 7.1 G/DL (ref 6–8.5)
SERVICE CMNT-IMP: ABNORMAL
SERVICE CMNT-IMP: NORMAL
SODIUM SERPL-SCNC: 132 MMOL/L (ref 134–144)
SODIUM SERPL-SCNC: 134 MMOL/L (ref 136–145)
SODIUM SERPL-SCNC: 136 MMOL/L (ref 136–145)
TSH SERPL DL<=0.005 MIU/L-ACNC: 1.48 UIU/ML (ref 0.45–4.5)

## 2021-07-29 PROCEDURE — 74011250637 HC RX REV CODE- 250/637: Performed by: NURSE PRACTITIONER

## 2021-07-29 PROCEDURE — 36415 COLL VENOUS BLD VENIPUNCTURE: CPT

## 2021-07-29 PROCEDURE — 82962 GLUCOSE BLOOD TEST: CPT

## 2021-07-29 PROCEDURE — 99221 1ST HOSP IP/OBS SF/LOW 40: CPT | Performed by: INTERNAL MEDICINE

## 2021-07-29 PROCEDURE — 83735 ASSAY OF MAGNESIUM: CPT

## 2021-07-29 PROCEDURE — 77010033678 HC OXYGEN DAILY

## 2021-07-29 PROCEDURE — 74011250637 HC RX REV CODE- 250/637: Performed by: INTERNAL MEDICINE

## 2021-07-29 PROCEDURE — 74011636637 HC RX REV CODE- 636/637: Performed by: INTERNAL MEDICINE

## 2021-07-29 PROCEDURE — 80048 BASIC METABOLIC PNL TOTAL CA: CPT

## 2021-07-29 PROCEDURE — 74011250636 HC RX REV CODE- 250/636: Performed by: INTERNAL MEDICINE

## 2021-07-29 PROCEDURE — 74011250636 HC RX REV CODE- 250/636: Performed by: NURSE PRACTITIONER

## 2021-07-29 PROCEDURE — 65660000001 HC RM ICU INTERMED STEPDOWN

## 2021-07-29 RX ORDER — FENTANYL 25 UG/1
1 PATCH TRANSDERMAL
Status: DISCONTINUED | OUTPATIENT
Start: 2021-07-29 | End: 2021-07-30 | Stop reason: HOSPADM

## 2021-07-29 RX ORDER — DEXTROSE 50 % IN WATER (D50W) INTRAVENOUS SYRINGE
12.5-25 AS NEEDED
Status: DISCONTINUED | OUTPATIENT
Start: 2021-07-29 | End: 2021-07-30 | Stop reason: HOSPADM

## 2021-07-29 RX ORDER — MAGNESIUM SULFATE 100 %
4 CRYSTALS MISCELLANEOUS AS NEEDED
Status: DISCONTINUED | OUTPATIENT
Start: 2021-07-29 | End: 2021-07-30 | Stop reason: HOSPADM

## 2021-07-29 RX ORDER — TRAZODONE HYDROCHLORIDE 100 MG/1
100 TABLET ORAL
Status: DISCONTINUED | OUTPATIENT
Start: 2021-07-29 | End: 2021-07-30 | Stop reason: HOSPADM

## 2021-07-29 RX ORDER — INSULIN LISPRO 100 [IU]/ML
INJECTION, SOLUTION INTRAVENOUS; SUBCUTANEOUS
Status: DISCONTINUED | OUTPATIENT
Start: 2021-07-29 | End: 2021-07-30 | Stop reason: HOSPADM

## 2021-07-29 RX ORDER — SODIUM CHLORIDE 9 MG/ML
75 INJECTION, SOLUTION INTRAVENOUS CONTINUOUS
Status: DISCONTINUED | OUTPATIENT
Start: 2021-07-29 | End: 2021-07-30 | Stop reason: HOSPADM

## 2021-07-29 RX ORDER — MAGNESIUM SULFATE 100 %
4 CRYSTALS MISCELLANEOUS AS NEEDED
Status: DISCONTINUED | OUTPATIENT
Start: 2021-07-29 | End: 2021-07-29

## 2021-07-29 RX ORDER — FENTANYL 100 UG/H
1 PATCH TRANSDERMAL
Status: DISCONTINUED | OUTPATIENT
Start: 2021-07-29 | End: 2021-07-30 | Stop reason: HOSPADM

## 2021-07-29 RX ORDER — INSULIN GLARGINE 100 [IU]/ML
20 INJECTION, SOLUTION SUBCUTANEOUS DAILY
Status: DISCONTINUED | OUTPATIENT
Start: 2021-07-29 | End: 2021-07-30 | Stop reason: HOSPADM

## 2021-07-29 RX ADMIN — ARIPIPRAZOLE 30 MG: 5 TABLET ORAL at 08:53

## 2021-07-29 RX ADMIN — INSULIN LISPRO 3 UNITS: 100 INJECTION, SOLUTION INTRAVENOUS; SUBCUTANEOUS at 11:01

## 2021-07-29 RX ADMIN — POTASSIUM CHLORIDE, DEXTROSE MONOHYDRATE AND SODIUM CHLORIDE 250 ML/HR: 150; 5; 450 INJECTION, SOLUTION INTRAVENOUS at 00:24

## 2021-07-29 RX ADMIN — OXYCODONE HYDROCHLORIDE AND ACETAMINOPHEN 250 MG: 500 TABLET ORAL at 17:34

## 2021-07-29 RX ADMIN — OXYCODONE HYDROCHLORIDE AND ACETAMINOPHEN 250 MG: 500 TABLET ORAL at 08:53

## 2021-07-29 RX ADMIN — CARVEDILOL 6.25 MG: 6.25 TABLET, FILM COATED ORAL at 08:53

## 2021-07-29 RX ADMIN — BUMETANIDE 1 MG: 1 TABLET ORAL at 08:52

## 2021-07-29 RX ADMIN — OXYCODONE 5 MG: 5 TABLET ORAL at 19:20

## 2021-07-29 RX ADMIN — ENOXAPARIN SODIUM 40 MG: 40 INJECTION SUBCUTANEOUS at 08:52

## 2021-07-29 RX ADMIN — ALPRAZOLAM 1 MG: 0.5 TABLET ORAL at 08:52

## 2021-07-29 RX ADMIN — Medication 10 ML: at 05:21

## 2021-07-29 RX ADMIN — CITALOPRAM HYDROBROMIDE 40 MG: 20 TABLET ORAL at 08:53

## 2021-07-29 RX ADMIN — ALPRAZOLAM 1 MG: 0.5 TABLET ORAL at 17:34

## 2021-07-29 RX ADMIN — OXYCODONE 5 MG: 5 TABLET ORAL at 06:26

## 2021-07-29 RX ADMIN — ATORVASTATIN CALCIUM 40 MG: 40 TABLET, FILM COATED ORAL at 08:53

## 2021-07-29 RX ADMIN — ALPRAZOLAM 1 MG: 0.5 TABLET ORAL at 21:45

## 2021-07-29 RX ADMIN — BUSPIRONE HYDROCHLORIDE 20 MG: 10 TABLET ORAL at 17:34

## 2021-07-29 RX ADMIN — TRAZODONE HYDROCHLORIDE 100 MG: 100 TABLET ORAL at 23:07

## 2021-07-29 RX ADMIN — SODIUM CHLORIDE 75 ML/HR: 9 INJECTION, SOLUTION INTRAVENOUS at 08:59

## 2021-07-29 RX ADMIN — SODIUM CHLORIDE 75 ML/HR: 9 INJECTION, SOLUTION INTRAVENOUS at 21:48

## 2021-07-29 RX ADMIN — OXYCODONE 5 MG: 5 TABLET ORAL at 15:08

## 2021-07-29 RX ADMIN — OXYCODONE 5 MG: 5 TABLET ORAL at 11:01

## 2021-07-29 RX ADMIN — Medication 10 ML: at 13:36

## 2021-07-29 RX ADMIN — BUMETANIDE 1 MG: 1 TABLET ORAL at 17:34

## 2021-07-29 RX ADMIN — POTASSIUM CHLORIDE, DEXTROSE MONOHYDRATE AND SODIUM CHLORIDE 250 ML/HR: 150; 5; 450 INJECTION, SOLUTION INTRAVENOUS at 04:46

## 2021-07-29 RX ADMIN — Medication 10 ML: at 21:53

## 2021-07-29 RX ADMIN — INSULIN GLARGINE 20 UNITS: 100 INJECTION, SOLUTION SUBCUTANEOUS at 08:51

## 2021-07-29 RX ADMIN — BUSPIRONE HYDROCHLORIDE 20 MG: 10 TABLET ORAL at 08:53

## 2021-07-29 NOTE — DIABETES MGMT
Patient earlier this morning. She reports elevated blood sugars that prompted her to insert a fresh insulin pump site. In fact, she reports changing the site 3 times within a 2 hour period without impact. Hence, she gave 50 units of Novolog insulin by injection; that did result in a positive impact on her BGs. Upon questioning, she notes that she is using a fresh vial of insulin and that she is using insulin pump supplies from an already-opened box. Hence, this may well constitute a pump site issue. She does not feel an lumps or bumps in the area she is injecting (abdomen). Recommended she move her sites to hips and thighs for now. Patient has transitioned off [address] today with 20 units of Lantus insulin. Her usual basal insulin dose is 38.5 units/D. BG @11am 165. OF note, her A1c was 9.1%. indicating there is room for improvement. I have contacted her endocrinologist, Vivian Solitario MD, who plans on seeing her after clinic today.      Anton Mazariegos DNP, RN, ACNS-BC, BC-ADM, [facility]  Clinical Nurse Specialist-Diabetes & Endocrine disorders    [facility] Diabetes [facility] (In-patient CNS consult service)  (g) [phone] (cig) [phone]

## 2021-07-29 NOTE — PROGRESS NOTES
07/28/21 2248   Vital Signs   Temp 97.5 °F (36.4 °C)   Temp Source Oral   Pulse (Heart Rate) 74   Heart Rate Source Monitor   Cardiac Rhythm Sinus Rhythm   Resp Rate 25   O2 Sat (%) 94 %   Level of Consciousness Alert (0)   BP (!) 99/55   MAP (Monitor) 69   MAP (Calculated) 70   BP 1 Method Automatic   BP 1 Location Left upper arm   BP Patient Position At rest   MEWS Score 3   Box Number Hardwired   Electrodes Replaced No   Pain 1   Pain Scale 1 Numeric (0 - 10)   Pain Intensity 1 0   Patient Stated Pain Goal 0   POSS Scale   Opioid Sedation Scale 1   Oxygen Therapy   Pulse via Oximetry 72 beats per minute   O2 Device Nasal cannula   Skin Assessment Clean, dry, & intact   O2 Flow Rate (L/min) 2 l/min   Patient Observation   Repositioned Head of bed elevated (degrees)   Patient Turned Turns self   Ambulate Ambulate to bathroom   Activity In bed;Resting quietly   Mode of Transportation Wheelchair;IV equipment; Oxygen   MEWS is a 3. Charge nurse notified. Will continue to monitor.

## 2021-07-29 NOTE — CONSULTS
Endocrinology Consult    Asked by Dr. Sheela Theodore to see this clinic patient mine for management of type 1 diabetes on an insulin pump. She missed her most recently f/u appt with me on 7/20/21. Earlier this week she started having higher sugars and spoke to my partner, Dr. Kwadwo Hernandez, after having given herself several large boluses of insulin and was able to bring her sugar down. Yesterday morning she had gone to the lab to have labs drawn for me and was also visiting a friend who was having surgery at the hospital and started feeling her sugar go up again and was getting fatigued and dry mouth and stopped by our office to see if we could give her a dose of insulin via syringe but I recommend she go to the ER to be evaluated as I was concerned she was getting dehydrated and going into to DKA. She was evaluated and found to be in mild DKA and started on an insulin drip and IV fluids and was transitioned off this morning at 9 am to Lantus 20 units. She did not appear to have any other reason for DKA such as an infection so the question is whether she was having poor absorption at her insulin infusion sites on the right side of her abdomen. Hu Dick from the program for diabetes [facility] saw her this morning and thought she could be developing scar tissue as she has exclusively been using the right side of her abdomen the past 6 months to try and keep her sites away from her dog that has previously pulled out her sites and recommended using the left side of her abdomen or her thighs for alternate sites. She had her insulin pump and insulin at the bedside and was able to put a fresh site in the left side of her abdomen.   She signed the insulin pump permission slip to use her pump in the hospital and I put in the order to have her use her pump while in the hospital.  I was able to set up a temporary basal for the next 19 hours for 50% as her home basal rate is 1.7 units/hr which give her 40 units/day and since she already received 20 units of lantus, this will allow her pump to give her the remaining amount of basal insulin over the next 19 hours until her lantus injection runs out tomorrow at 9am.  She understands that she needs to bolus for her food and sugar before meals and bedtime and will record this on the flowsheet that was provided to her by the nurse, Víctor, in my presence. Current settings are as follows:  - basal: 12a: 1.7  - Carb ratio: 12  - sensitivity: 20  - target: 12a: 130-150, 6a: 100-130, 10p: 130-150  - active insulin time: 3 hours    Data reviewed:  Component      Latest Ref Rng & Units 7/28/2021           8:57 AM   Hemoglobin A1c, (calculated)      4.8 - 5.6 % 9.1 (H)   Estimated average glucose      mg/dL 214     Assessment/Plan:  1) Type 1 diabetes uncontrolled: She likely went into DKA due to an issue with her site and now is using a new site. I will observe her overnight and provided everything is stable, she should be ok for discharge tomorrow. Thanks for the consult. Please don't hesitate to send me a message through perfect serve with any questions or concerns. I spent 30 minutes of face to face time on her case and > 50% of the time was spent reviewing the chart and coordinating her care with the nurse caring for her.         Sukumar Deutsch MD

## 2021-07-29 NOTE — PROGRESS NOTES
Transition of Care Plan:    RUR: 26% - \"moderate risk\"  Disposition: TBD pending progress - anticipate home with f/u apt & resumed home O2 use (3L provided by [employer])  Follow up appointments: PCP & specialist as indicated  DME needed: Pt reported having access to home O2 (3L provided by [employer]). No current DME needs identified for d/c  Transportation at Discharge: Pt's father to provide transport at d/c  [place] or means to access home: Pt has access to her home       IM Medicare Letter: 2nd IM to be provided prior to d/c  BCPI-A Bundle Letter: N/A  Caregiver Contact: Pt's boyfriend (Suad Moses: [phone])  Discharge Caregiver contacted prior to discharge? To be contacted per pt's request              Initial note: CM reviewed pt's chart prior to moving forward with d/c planning. CM met with pt at bedside to introduce role, complete initial assessment, and address any immediate questions/concerns related to the d/c plan. Upon conducting room visit, pt presented as A&O x4. CM confirmed pt's demographic information is up-to-date in the chart. Pt reported her preferred pharmacy as [facility]; no issues identified accessing/paying for medication. Pt reported no immediate questions or concerns related to the d/c plan. Full assessment detailed below:    Reason for Admission:  DKA              RUR Score: 26% - \"moderate risk\"                 PCP: First and Last name:   Imelda Mazariegos MD   Name of Practice: [facility] 1946   Are you a current patient: Yes/No: Yes   Approximate date of last visit: Pt reported last visit with PCP was in [facility] of 2021\"   Can you participate in a virtual visit if needed: Yes    Do you (patient/family) have any concerns for transition/discharge? CM did not identify any barriers throughout the initial assessment that present an immediate need for concern.                  Plan for utilizing home health: No PT/OT consults in place currently; CM to continue to follow to determine pt's post-acute therapy needs for d/c. Pt reported being independent with ADL's at baseline & identified DME use in the form of home O2 (3L provided by [employer]). Pt reported no prior hx of utilizing HH/SNF/IPR interventions. Current Advanced Directive/Advance Care Plan:  Full Code    Advance Care Planning     General Advance Care Planning (ACP) Conversation      Date of Conversation: 7/28/2021  Conducted with: Patient with Decision Making Capacity    Healthcare Decision Maker:     Primary Decision Maker: Radha Farrell - [phone]    Secondary Decision Maker: Bernice Neff - [phone]    Content/Action Overview:   Has NO ACP documents/care preferences - information provided, considering goals and options  Reviewed DNR/DNI and patient elects Full Code (Attempt Resuscitation)  Additional Comments: Pt requested blank copy of AMD; CM intended to provide, however, pastoral care completed AMD after CM initial encounter     Length of Voluntary ACP Conversation in minutes:  <16 minutes (Non-Billable)  . Care Management Interventions  PCP Verified by CM: Yes  Palliative Care Criteria Met (RRAT>21 & CHF Dx)?: Yes  Mode of Transport at Discharge:  Other (see comment) (Pt's father to provide transport at d/c)  Transition of Care Consult (CM Consult): Discharge Planning  Discharge Durable Medical Equipment: No  Physical Therapy Consult: No  Occupational Therapy Consult: No  Speech Therapy Consult: No  Current Support Network: Own Home, Other (Pt lives with her boyfriend in [facility] with 3 REN[address])  Confirm Follow Up Transport: [address] Provided?: No  Discharge Location  Discharge Placement: LOUISE Orozco, RN Care Manager   [phone]

## 2021-07-29 NOTE — PROGRESS NOTES
Spiritual Care Assessment/Progress Note  [facility]      NAME: Kurt Humphrey      MRN: [id number]  AGE: 49 y.o. SEX: female  Judaism Affiliation: Latter-day   Language: English     7/29/2021     Total Time (in minutes): 14     Spiritual Assessment begun in [facility] through conversation with:         [x]Patient        [] Family    [] Friend(s)        Reason for Consult: Initial/Spiritual assessment, patient floor     Spiritual beliefs: (Please include comment if needed)     [] Identifies with a kristy tradition:         [] Supported by a kristy community:            [] Claims no spiritual orientation:           [] Seeking spiritual identity:                [] Adheres to an individual form of spirituality:           [x] Not able to assess:                           Identified resources for coping:      [] Prayer                               [] Music                  [] Guided Imagery     [] Family/friends                 [] Pet visits     [] Devotional reading                         [x] Unknown     [] Other:                                               Interventions offered during this visit: (See comments for more details)    Patient Interventions: Initial/Spiritual assessment, patient floor           Plan of Care:     [] Support spiritual and/or cultural needs    [] Support AMD and/or advance care planning process      [] Support grieving process   [] Coordinate Rites and/or Rituals    [] Coordination with community clergy   [x] No spiritual needs identified at this time   [] Detailed Plan of Care below (See Comments)  [] Make referral to Music Therapy  [] Make referral to Pet Therapy     [] Make referral to Addiction services  [] Make referral to [facility]  [] Make referral to Spiritual Care Partner  [] No future visits requested        [x] Follow up upon further referrals     Comments: Provided [facility] for [facility] [address].   Reviewed pt's chart prior to this visit to augment any observed or expressed support needs this pt may have and assess possible need for AMD.  Pt was having lunch as asked if [address] could return later. [address] consented. Assured pt of prayers and affirmed ongoing availability of support. Madison Poole MDiv.  Staff   Request  Support/Spiritual Care Services via [facility]

## 2021-07-29 NOTE — PROGRESS NOTES
Nutrition: MST referral received for EN/TPN PTA. Chart reviewed; no mention/evidence of feeding tube/nutrition support. Patient is currently receiving a PO diet. MST filed in error. RD available by consult if needs arise. Otherwise, patient will be rescreened per policy. Thanks.   Hang Ospina RD

## 2021-07-29 NOTE — PROGRESS NOTES
Received message from patient's nurse stating:    Patient is on insulin drip with NS. Glucose is below 250. Requesting order to add dextrose to IV fluid per protocol         Discussion / orders:    Patient currently on half-normal saline with 20 of KCl at 250 mL/hour  Change IV fluid to D5 and half-normal saline with 20 of KCl at 250 mL/h           Please note that this note was dictated using Dragon computer voice recognition software. Quite often unanticipated grammatical, syntax, homophones, and other interpretive errors are inadvertently transcribed by the computer software. Please disregard these errors. Please excuse any errors that have escaped final proofreading.

## 2021-07-29 NOTE — PROGRESS NOTES
0700: Bedside shift change report given to Víctor RN (oncoming nurse) by Marika Bowles RN (offgoing nurse). Report included the following information SBAR and Kardex.

## 2021-07-29 NOTE — PROGRESS NOTES
End of Shift Note    Bedside shift change report given to Víctor (oncoming nurse) by Carmen Milligan (offgoing nurse). Report included the following information SBAR and Kardex    Shift worked:  [id number]     Shift summary and any significant changes:     Patient states that she normally takes Trazodone at bedtime. Concerns for physician to address:       Zone phone for oncoming shift:          Activity:  Activity Level: Up with Assistance  Number times ambulated in hallways past shift: 0  Number of times OOB to chair past shift: 2    Cardiac:   Cardiac Monitoring: Yes      Cardiac Rhythm: Sinus Rhythm    Access:   Current line(s): PIV     Genitourinary:   Urinary status: voiding    Respiratory:   O2 Device: Nasal cannula  Chronic home O2 use?: YES  Incentive spirometer at bedside: YES     GI:  Last Bowel Movement Date: 07/27/21  Current diet:  DIET NPO  Passing flatus: YES  Tolerating current diet: YES       Pain Management:   Patient states pain is manageable on current regimen: YES    Skin:  Joey Score: 20  Interventions: increase time out of bed and PT/OT consult    Patient Safety:  Fall Score:  Total Score: 3  Interventions: bed/chair alarm, assistive device (walker, cane, etc), gripper socks, pt to call before getting OOB and stay with me (per policy)  High Fall Risk: Yes    Length of Stay:  Expected LOS: - - -  Actual LOS: [facility]

## 2021-07-29 NOTE — TELEPHONE ENCOUNTER
----- Message from Memory Fess sent at 7/29/2021 10:29 AM EDT -----  Regarding: Ru CHAPARRO Rekha/ telephone   #pt in hospital  Caller's first and last name and relationship (if not the patient):Best contact number(s):[phone] are the symptoms: pt in hospitalTransfer successful - yes/no (include outcome): no/ no answerTransfer declined - yes/no (include reason): no answerWas caller advised to seek appropriate level of care - yes/no: pt in hospitalDetails to clarify the request: pt stated that she is in the hospital because Dr. Meño Powell told her to go there.   She stated that she needs to talk to [facility]

## 2021-07-29 NOTE — PROGRESS NOTES
Hospitalist Progress Note    NAME: Jose Cruz Soliman   :  1970   MRN:  [id number]       Assessment / Plan:  Diabetes type 2 hyperglycemia came in with DKA   Anion Gap of 15   DEBORAH likely from dehydration and DKA   Patient anion  gap is closed. Switch her to Lantus and sliding scale insulin. Continue to check electrolytes replace accordingly. Diabetic team management consulted. Endocrinologist also is coming to see her. Hopefully her insulin pump gets fixed or get another way out. COPD   Patient use home oxygen as needed specially on ambulation. Diastolic CHF with varicose veins s/p ligation and stripping  On Bumex twice daily  Echo 2020 shows EF 55 to 60%. Hypertension  Anxiety disorder  Diabetic neuropathy  CAD  Insomnia  Continue the home medications. Arthritis of the knees on narcotic treatment  Continue fentanyl patch, diclofenac gel for the knee. Tobacco abuse  Smokes 1 and half pack a day, on nicotine patch  Hx of endometriosis , some times has pain   Code Status: Full code  Surrogate Decision Maker:   DVT Prophylaxis: Lovenox  GI Prophylaxis: not indicated  Baseline: Ambulatory with assistance at home                  Subjective:     Chief Complaint / Reason for Physician Visit  Patient does not have any complaints. She is happy that her glucose is now controlled. Had uneventful night. \". Discussed with RN events overnight. Review of Systems:  Symptom Y/N Comments  Symptom Y/N Comments   Fever/Chills n   Chest Pain n    Poor Appetite    Edema     Cough    Abdominal Pain n    Sputum    Joint Pain     SOB/HERNÁNDEZ n   Pruritis/Rash     Nausea/vomit    Tolerating PT/OT     Diarrhea    Tolerating Diet     Constipation    Other       Could NOT obtain due to:      Objective:     VITALS:   Last 24hrs VS reviewed since prior progress note.  Most recent are:  Patient Vitals for the past 24 hrs:   Temp Pulse Resp BP SpO2   21 0730 98.3 °F (36.8 °C) 68 15 117/67 99 %   21 0321 97.8 °F (36.6 °C) 82 23 117/62 95 %   07/28/21 2248 97.5 °F (36.4 °C) 74 25 (!) 99/55 94 %   07/28/21 2008 98.1 °F (36.7 °C) 74 14 119/61 95 %   07/28/21 1642 98.2 °F (36.8 °C) 82 16 121/70 95 %   07/28/21 1615 -- 87 18 (!) 118/53 96 %   07/28/21 1600 -- 83 30 119/63 97 %   07/28/21 1116 -- -- -- -- 100 %   07/28/21 1054 98.7 °F (37.1 °C) 91 20 131/75 100 %       Intake/Output Summary (Last 24 hours) at 7/29/2021 0756  Last data filed at 7/29/2021 0321  Gross per 24 hour   Intake 1720.83 ml   Output 1350 ml   Net 370.83 ml        PHYSICAL EXAM:  General: WD, WN. Alert, cooperative, no acute distress    EENT:  EOMI. Anicteric sclerae. MMM  Resp:  CTA bilaterally, no wheezing or rales. No accessory muscle use  CV:  Regular  rhythm,  No edema  GI:  Soft, Non distended, Non tender.  +Bowel sounds  Neurologic:  Alert and oriented X 3, normal speech,   Psych:   Good insight. Not anxious nor agitated  Skin:  No rashes. No jaundice    Reviewed most current lab test results and cultures  YES  Reviewed most current radiology test results   YES  Review and summation of old records today    NO  Reviewed patient's current orders and MAR    YES  PMH/ reviewed - no change compared to H&P  ________________________________________________________________________  Care Plan discussed with:    Comments   Patient y    Family      RN y    Care Manager     Consultant                        Multidiciplinary team rounds were held today with , nursing, pharmacist and clinical coordinator. Patient's plan of care was discussed; medications were reviewed and discharge planning was addressed.      ________________________________________________________________________  Total NON critical care TIME:  35   Minutes    Total CRITICAL CARE TIME Spent:   Minutes non procedure based      Comments   >50% of visit spent in counseling and coordination of care y ________________________________________________________________________  Adithya Ortiz MD     Procedures: see electronic medical records for all procedures/Xrays and details which were not copied into this note but were reviewed prior to creation of Plan. LABS:  I reviewed today's most current labs and imaging studies. Pertinent labs include:  Recent Labs     07/28/21  1626 07/28/21  1136   WBC  --  8.5   HGB 10.7* 13.3   HCT  --  41.1   PLT  --  227     Recent Labs     07/29/21  0416 07/29/21  0010 07/28/21 2010 07/28/21  1623 07/28/21  1623 07/28/21  1136 07/28/21  1136 07/28/21  0857 07/28/21  0857   * 136 136   < > 134*   < > 128*   < > 132*   K 4.3 3.9 3.7   < > 3.5   < > 4.7   < > 5.2    104 104   < > 100   < > 90*   < > 89*   CO2 24 26 25   < > 24   < > 23   < > 22   * 129* 122*   < > 342*   < > 568*   < > 466*   BUN 23* 25* 27*   < > 28*   < > 32*   < > 28*   CREA 1.24* 1.19* 1.39*   < > 1.72*   < > 1.73*   < > 1.57*   CA 8.1* 7.8* 8.5   < > 8.0*   < > 9.0   < > 9.6   MG 1.4* 1.5*  --   --  1.5*   < > 1.7  --   --    PHOS  --   --   --   --  2.0*  --   --   --   --    ALB  --   --   --   --   --   --  4.1  --  4.4   TBILI  --   --   --   --   --   --  0.7  --  0.5   ALT  --   --   --   --   --   --  20  --  12    < > = values in this interval not displayed. Signed:  Adithya Ortiz MD

## 2021-07-29 NOTE — PROGRESS NOTES
Received request from [place]. Upon review of chart and communication with care team, patient's decision making abilities are not in question. Patient was/were present in the room during visit.     Goals of ACP Conversation:  [address] documents     Health Care Decision Makers:       Click here to complete [address] including selection of the [address] Relationship (ie \"Primary\")      Today we:  Documented Next of Kin, per patient report     Advance Care Planning Documents (Patient Wishes) on file:  Living Will/ Advance Directive      Assessment:    Provided support to this pt in [address] Reviewed pt's chart prior to this visit to augment any observed or expressed support needs this pt may have and to better assist pt with ACP consultation. Offered assistance to pt as a result of AMD consult. Explained purpose of AMD and when it becomes active to pt. Explained Hiearchy of state guidelines concerning decision making with pt. Assisted pt in completing AMD, answering questions for clarity as pt progressed through the completion of AMD.  Pt named her boyfriend, Krish Mccray as her MPOA and Marland Nose, who is like a father to her, as her secondary decision maker. Returned original and made copies for pt and added a copy to pt's physical chart to be scanned. [address] engaged pt in life review and offered listening presence.   [address] assured pt of prayer and concluded by affirming ongoing availability of support.           Interventions:  Provided education on documents for clarity and greater understanding  Discussed and provided education on state decision maker hierarchy  Assisted in the completion of documents according to patient's wishes at this time  Encouraged ongoing ACP conversation with future decision makers and loved ones  Returned original document(s) to patient, as well as copies for distribution to appointed agents      Outcomes/Plan:  ACP Discussion Completed  New Advance Directive completed  Original Advance Directive form and copies given to patient  Copy of Advance Directive given to staff to scan into medical record      Electronically signed by Chaplain Karol on 7/29/2021 at 4:10 PM

## 2021-07-29 NOTE — ACP (ADVANCE CARE PLANNING)
Advance Care Planning   Advance Care Planning Inpatient Note  [address]  Spiritual Care Department    Today's Date: 7/29/2021  Unit: [facility]    Received request from [place]. Upon review of chart and communication with care team, patient's decision making abilities are not in question. Patient was/were present in the room during visit. Goals of ACP Conversation:  Discuss Advance Care planning documents    Health Care Decision Makers:      Click here to complete [address] including selection of the Healthcare Decision Maker Relationship (ie \"Primary\")     Today we:  Documented Next of Kin, per patient report    Advance Care Planning Documents (Patient Wishes) on file:  Living Will/ Advance Directive     Assessment:    Provided support to this pt in [address] Reviewed pt's chart prior to this visit to augment any observed or expressed support needs this pt may have and to better assist pt with ACP consultation. Offered assistance to pt as a result of AMD consult. Explained purpose of AMD and when it becomes active to pt. Explained Hiearchy of state guidelines concerning decision making with pt. Assisted pt in completing AMD, answering questions for clarity as pt progressed through the completion of AMD.  Pt named her boyfriend, Latoya Chavez as her MPOA and Dyllan Deewy, who is like a father to her, as her secondary decision maker. Returned original and made copies for pt and added a copy to pt's physical chart to be scanned. [address] engaged pt in life review and offered listening presence. [address] assured pt of prayer and concluded by affirming ongoing availability of support.         Interventions:  Provided education on documents for clarity and greater understanding  Discussed and provided education on state decision maker hierarchy  Assisted in the completion of documents according to patient's wishes at this time  Encouraged ongoing ACP conversation with future decision makers and loved ones  Returned original document(s) to patient, as well as copies for distribution to appointed agents     Outcomes/Plan:  ACP Discussion Completed  New Advance Directive completed  Original Advance Directive form and copies given to patient  Copy of Advance Directive given to staff to scan into medical record     Electronically signed by Chaplain Bunny on 7/29/2021 at 4:10 PM

## 2021-07-30 VITALS
HEART RATE: 70 BPM | BODY MASS INDEX: 34.5 KG/M2 | HEIGHT: 64 IN | DIASTOLIC BLOOD PRESSURE: 69 MMHG | WEIGHT: 202.1 LBS | RESPIRATION RATE: 23 BRPM | SYSTOLIC BLOOD PRESSURE: 126 MMHG | OXYGEN SATURATION: 97 % | TEMPERATURE: 97.7 F

## 2021-07-30 LAB
ALBUMIN SERPL-MCNC: 3.1 G/DL (ref 3.5–5)
ALBUMIN/GLOB SERPL: 1 {RATIO} (ref 1.1–2.2)
ALP SERPL-CCNC: 79 U/L (ref 45–117)
ALT SERPL-CCNC: 17 U/L (ref 12–78)
ANION GAP SERPL CALC-SCNC: 4 MMOL/L (ref 5–15)
AST SERPL-CCNC: 14 U/L (ref 15–37)
BASOPHILS # BLD: 0 K/UL (ref 0–0.1)
BASOPHILS NFR BLD: 0 % (ref 0–1)
BILIRUB SERPL-MCNC: 0.3 MG/DL (ref 0.2–1)
BUN SERPL-MCNC: 15 MG/DL (ref 6–20)
BUN/CREAT SERPL: 15 (ref 12–20)
CALCIUM SERPL-MCNC: 8.2 MG/DL (ref 8.5–10.1)
CHLORIDE SERPL-SCNC: 107 MMOL/L (ref 97–108)
CO2 SERPL-SCNC: 28 MMOL/L (ref 21–32)
CREAT SERPL-MCNC: 1 MG/DL (ref 0.55–1.02)
DIFFERENTIAL METHOD BLD: ABNORMAL
EOSINOPHIL # BLD: 0 K/UL (ref 0–0.4)
EOSINOPHIL NFR BLD: 1 % (ref 0–7)
ERYTHROCYTE [DISTWIDTH] IN BLOOD BY AUTOMATED COUNT: 15.9 % (ref 11.5–14.5)
GLOBULIN SER CALC-MCNC: 3 G/DL (ref 2–4)
GLUCOSE BLD STRIP.AUTO-MCNC: 146 MG/DL (ref 65–117)
GLUCOSE SERPL-MCNC: 94 MG/DL (ref 65–100)
HCT VFR BLD AUTO: 34.9 % (ref 35–47)
HGB BLD-MCNC: 11.4 G/DL (ref 11.5–16)
IMM GRANULOCYTES # BLD AUTO: 0 K/UL (ref 0–0.04)
IMM GRANULOCYTES NFR BLD AUTO: 0 % (ref 0–0.5)
LYMPHOCYTES # BLD: 3.3 K/UL (ref 0.8–3.5)
LYMPHOCYTES NFR BLD: 52 % (ref 12–49)
MCH RBC QN AUTO: 29.5 PG (ref 26–34)
MCHC RBC AUTO-ENTMCNC: 32.7 G/DL (ref 30–36.5)
MCV RBC AUTO: 90.2 FL (ref 80–99)
MONOCYTES # BLD: 0.5 K/UL (ref 0–1)
MONOCYTES NFR BLD: 8 % (ref 5–13)
NEUTS SEG # BLD: 2.4 K/UL (ref 1.8–8)
NEUTS SEG NFR BLD: 39 % (ref 32–75)
NRBC # BLD: 0 K/UL (ref 0–0.01)
NRBC BLD-RTO: 0 PER 100 WBC
PLATELET # BLD AUTO: 189 K/UL (ref 150–400)
PMV BLD AUTO: 10.4 FL (ref 8.9–12.9)
POTASSIUM SERPL-SCNC: 3.6 MMOL/L (ref 3.5–5.1)
PROT SERPL-MCNC: 6.1 G/DL (ref 6.4–8.2)
RBC # BLD AUTO: 3.87 M/UL (ref 3.8–5.2)
SERVICE CMNT-IMP: ABNORMAL
SODIUM SERPL-SCNC: 139 MMOL/L (ref 136–145)
WBC # BLD AUTO: 6.3 K/UL (ref 3.6–11)

## 2021-07-30 PROCEDURE — 77010033678 HC OXYGEN DAILY

## 2021-07-30 PROCEDURE — 99231 SBSQ HOSP IP/OBS SF/LOW 25: CPT | Performed by: INTERNAL MEDICINE

## 2021-07-30 PROCEDURE — 80053 COMPREHEN METABOLIC PANEL: CPT

## 2021-07-30 PROCEDURE — 74011636637 HC RX REV CODE- 636/637: Performed by: INTERNAL MEDICINE

## 2021-07-30 PROCEDURE — 74011250637 HC RX REV CODE- 250/637: Performed by: INTERNAL MEDICINE

## 2021-07-30 PROCEDURE — 82962 GLUCOSE BLOOD TEST: CPT

## 2021-07-30 PROCEDURE — 85025 COMPLETE CBC W/AUTO DIFF WBC: CPT

## 2021-07-30 PROCEDURE — 74011250636 HC RX REV CODE- 250/636: Performed by: INTERNAL MEDICINE

## 2021-07-30 PROCEDURE — 36415 COLL VENOUS BLD VENIPUNCTURE: CPT

## 2021-07-30 RX ADMIN — OXYCODONE 5 MG: 5 TABLET ORAL at 03:31

## 2021-07-30 RX ADMIN — CARVEDILOL 6.25 MG: 6.25 TABLET, FILM COATED ORAL at 08:39

## 2021-07-30 RX ADMIN — BUSPIRONE HYDROCHLORIDE 20 MG: 10 TABLET ORAL at 08:39

## 2021-07-30 RX ADMIN — CITALOPRAM HYDROBROMIDE 40 MG: 20 TABLET ORAL at 08:40

## 2021-07-30 RX ADMIN — ENOXAPARIN SODIUM 40 MG: 40 INJECTION SUBCUTANEOUS at 08:38

## 2021-07-30 RX ADMIN — CLOPIDOGREL BISULFATE 75 MG: 75 TABLET ORAL at 08:40

## 2021-07-30 RX ADMIN — ATORVASTATIN CALCIUM 40 MG: 40 TABLET, FILM COATED ORAL at 08:39

## 2021-07-30 RX ADMIN — OXYCODONE 5 MG: 5 TABLET ORAL at 07:34

## 2021-07-30 RX ADMIN — ALPRAZOLAM 1 MG: 0.5 TABLET ORAL at 08:41

## 2021-07-30 RX ADMIN — BUMETANIDE 1 MG: 1 TABLET ORAL at 08:40

## 2021-07-30 RX ADMIN — OXYCODONE HYDROCHLORIDE AND ACETAMINOPHEN 250 MG: 500 TABLET ORAL at 08:40

## 2021-07-30 RX ADMIN — Medication 10 ML: at 05:23

## 2021-07-30 RX ADMIN — INSULIN LISPRO 1.5 UNITS: 100 INJECTION, SOLUTION INTRAVENOUS; SUBCUTANEOUS at 07:30

## 2021-07-30 NOTE — PROGRESS NOTES
0700: Bedside and Verbal shift change report given to Marvin Lee (oncoming nurse) by Gali Boyd (offgoing nurse). Report included the following information SBAR, Kardex, Intake/Output, MAR and Recent Results.

## 2021-07-30 NOTE — PROGRESS NOTES
Bedside and Verbal shift change report received from [facility]. Report included the following information SBAR, Kardex and Recent Results.

## 2021-07-30 NOTE — PROGRESS NOTES
Endocrinology Progress Note    Problem: Type 1 diabetes uncontrolled    Came by to see patient this morning and she is feeling much better after being back on the pump and her sugars came down as follows:    Component      Latest Ref Rng & Units 2021           7:43 AM  9:11 PM  4:12 PM   GLUCOSE,FAST - POC      65 - 117 mg/dL 146 (H) 122 (H) 263 (H)   Performed by       Chet Brice PCT Lizzy Zamudio PCT John Thompson     I reviewed her pump settings more closely and it turns out her current settings are as follows:    Current settings are as follows:  - basal: 12a: 1.55, 12p: 1.45, 2p: 1.7  - Carb ratio: 15  - sensitivity: 20  - target: 12a: 130-151, 6a: 100-130, 6p: 130-149  - active insulin time: 3 hours    Her temporary basal will  at 9am this morning and then she will resume her settings above. She was encourage to avoid using the right side of her abdomen for sites for her pump for the next 1-2 months and just use her left side and upper/outer thighs. She is stable for discharge from my perspective and I have arranged for a hospital follow up on 21 at 9:10am    I spent 15 minutes of face to face time on her case and > 50% of the time was spent reviewing the chart and coordinating her care with the nurse caring for her. Please don't hesitate to send me a message through perfect serve with any questions or concerns.     Keron Hyman MD

## 2021-07-30 NOTE — PROGRESS NOTES
Problem: Patient Education: Go to Patient Education Activity  Goal: Patient/Family Education  Outcome: Resolved/Met     Problem: DKA: Day 1  Goal: Off Pathway (Use only if patient is Off Pathway)  Outcome: Resolved/Met  Goal: Activity/Safety  Outcome: Resolved/Met  Goal: Consults, if ordered  Outcome: Resolved/Met  Goal: Diagnostic Tests/Procedures, if Ordered  Outcome: Resolved/Met  Goal: Nutrition/Diet  Outcome: Resolved/Met  Goal: Discharge Planning  Outcome: Resolved/Met  Goal: Medications  Outcome: Resolved/Met  Goal: Respiratory  Outcome: Resolved/Met  Goal: Treatments/Interventions/Procedures  Outcome: Resolved/Met  Goal: Psychosocial  Outcome: Resolved/Met  Goal: *Hemodynamically stable  Outcome: Resolved/Met  Goal: *Blood glucose falling 50 to 100 mg/dl/hr  Outcome: Resolved/Met  Goal: *Potassium normalizing  Outcome: Resolved/Met     Problem: DKA: Day 2  Goal: Off Pathway (Use only if patient is Off Pathway)  Outcome: Resolved/Met  Goal: Activity/Safety  Outcome: Resolved/Met  Goal: Consults, if ordered  Outcome: Resolved/Met  Goal: Diagnostic Test/Procedures  Outcome: Resolved/Met  Goal: Nutrition/Diet  Outcome: Resolved/Met  Goal: Discharge Planning  Outcome: Resolved/Met  Goal: Medications  Outcome: Resolved/Met  Goal: Respiratory  Outcome: Resolved/Met  Goal: Treatments/Interventions/Procedures  Outcome: Resolved/Met  Goal: Psychosocial  Outcome: Resolved/Met  Goal: *Acidosis resolved  Outcome: Resolved/Met  Goal: *Tolerating diet  Outcome: Resolved/Met  Goal: *Demonstrates progressive activity  Outcome: Resolved/Met  Goal: *Blood glucose 80 to 180 mg/dl  Outcome: Resolved/Met     Problem: DKA: Day 3  Goal: Off Pathway (Use only if patient is Off Pathway)  Outcome: Resolved/Met  Goal: Activity/Safety  Outcome: Resolved/Met  Goal: Diagnostic Test/Procedures  Outcome: Resolved/Met  Goal: Nutrition/Diet  Outcome: Resolved/Met  Goal: Discharge Planning  Outcome: Resolved/Met  Goal: Medications  Outcome: Resolved/Met  Goal: Treatments/Interventions/Procedures  Outcome: Resolved/Met  Goal: Psychosocial  Outcome: Resolved/Met     Problem: DKA: Discharge Outcomes  Goal: *Ambulates and performs ADL's  Outcome: Resolved/Met  Goal: *Describes follow-up/return visits to physicians, diabetes treatment coordinator and other resources  Outcome: Resolved/Met  Goal: *Blood glucose at patient's target range  Outcome: Resolved/Met  Goal: *Acidosis resolved  Outcome: Resolved/Met  Goal: *Tolerating diet  Outcome: Resolved/Met  Goal: *Verbalizes understanding and describes prescribed diet  Outcome: Resolved/Met  Goal: *Describes blood glucose goals, monitoring, sick day rules, hypo/hyperglycemia  Outcome: Resolved/Met  Goal: *Describes available resources and support systems  Outcome: Resolved/Met  Goal: *Verbalizes name, dosage, time, side effects, and number of days to continue medications  Outcome: Resolved/Met  Goal: *Demonstrates ability to self-administer insulin  Outcome: Resolved/Met     Problem: Falls - Risk of  Goal: *Absence of Falls  Description: Document Deborah Fall Risk and appropriate interventions in the flowsheet.   Outcome: Resolved/Met  Note: Fall Risk Interventions:  Mobility Interventions: Assess mobility with egress test, Bed/chair exit alarm, Communicate number of staff needed for ambulation/transfer, Patient to call before getting OOB         Medication Interventions: Assess postural VS orthostatic hypotension, Bed/chair exit alarm, Patient to call before getting OOB, Teach patient to arise slowly    Elimination Interventions: Bed/chair exit alarm, Call light in reach, Toilet paper/wipes in reach, Toileting schedule/hourly rounds              Problem: Patient Education: Go to Patient Education Activity  Goal: Patient/Family Education  Outcome: Resolved/Met     Problem: Diabetes Self-Management  Goal: *Disease process and treatment process  Description: Define diabetes and identify own type of diabetes; list 3 options for treating diabetes. Outcome: Resolved/Met  Goal: *Incorporating nutritional management into lifestyle  Description: Describe effect of type, amount and timing of food on blood glucose; list 3 methods for planning meals. Outcome: Resolved/Met  Goal: *Incorporating physical activity into lifestyle  Description: State effect of exercise on blood glucose levels. Outcome: Resolved/Met  Goal: *Developing strategies to promote health/change behavior  Description: Define the ABC's of diabetes; identify appropriate screenings, schedule and personal plan for screenings. Outcome: Resolved/Met  Goal: *Using medications safely  Description: State effect of diabetes medications on diabetes; name diabetes medication taking, action and side effects. Outcome: Resolved/Met  Goal: *Monitoring blood glucose, interpreting and using results  Description: Identify recommended blood glucose targets  and personal targets. Outcome: Resolved/Met  Goal: *Prevention, detection, treatment of acute complications  Description: List symptoms of hyper- and hypoglycemia; describe how to treat low blood sugar and actions for lowering  high blood glucose level. Outcome: Resolved/Met  Goal: *Prevention, detection and treatment of chronic complications  Description: Define the natural course of diabetes and describe the relationship of blood glucose levels to long term complications of diabetes.   Outcome: Resolved/Met  Goal: *Developing strategies to address psychosocial issues  Description: Describe feelings about living with diabetes; identify support needed and support network  Outcome: Resolved/Met  Goal: *Insulin pump training  Outcome: Resolved/Met  Goal: *Sick day guidelines  Outcome: Resolved/Met  Goal: *Patient Specific Goal (EDIT GOAL, INSERT TEXT)  Outcome: Resolved/Met     Problem: Patient Education: Go to Patient Education Activity  Goal: Patient/Family Education  Outcome: Resolved/Met     Problem: Anxiety  Goal: *Alleviation of anxiety  Outcome: Resolved/Met  Goal: *Alleviation of anxiety (Palliative Care)  Outcome: Resolved/Met     Problem: Patient Education: Go to Patient Education Activity  Goal: Patient/Family Education  Outcome: Resolved/Met

## 2021-07-30 NOTE — DISCHARGE SUMMARY
Hospitalist Discharge Summary     Patient ID:  Butch Mo  [id number]  22 y.o.  1970    PCP on record: Mana Barrera MD    Admit date: 7/28/2021  Discharge date and time: 7/30/2021      DISCHARGE DIAGNOSIS:    dka      CONSULTATIONS:  IP CONSULT TO ENDOCRINOLOGY    Excerpted HPI from H&P of Irene Watters MD:  Devendra Jones is a 49 y.o.  female who presents with dehydration and hyperglycemia . Patient said that her insuline pump has not been working since yesterday and she has to inject insuline herself feeling dehydrated and looking at high glu level. Blood sugar level ranged from 600 to 800 prior to admission   Patient takes up to 34 U insuline thru the pulp in 24 hours. came in to hospital when she cant manage it more and feels dehydrated. Otherwise denied urinary complaints   Denied fever or chills   Denied abdominal  Pain   She said she has endometriosis pain here and there        ______________________________________________________________________  DISCHARGE SUMMARY/HOSPITAL COURSE:  for full details see H&P, daily progress notes, labs, consult notes. Diabetes type 2 hyperglycemia came in with DKA   Anion Gap of 15   DEBORAH likely from dehydration and DKA   Patient's creatinine came down to 1.0 after hydration. Blood sugar down below 200 as well. Patient placed back on her insulin pump. Appreciated endocrinologist help on the input. Patient ready to be discharged. The insulin pump site switched from one side of the the abdomen where it forms fibrosis for insulin absorption to the other side. It seems to be working now. Patient to follow-up with endocrinologist and primary care as an outpatient. COPD   Patient use home oxygen as needed specially on ambulation. Diastolic CHF with varicose veins s/p ligation and stripping  On Bumex twice daily  Echo 08/20/2020 shows EF 55 to 60%.   Hypertension  Anxiety disorder  Diabetic neuropathy  CAD  Insomnia  Continue the home medications. Arthritis of the knees on narcotic treatment  Continue fentanyl patch, diclofenac gel for the knee. Tobacco abuse  Smokes 1 and half pack a day, on nicotine patch  Hx of endometriosis , some times has pain           _______________________________________________________________________  Patient seen and examined by me on discharge day. Pertinent Findings:  Gen:    Not in distress  Chest: Clear lungs  CVS:   Regular rhythm. No edema  Abd:  Soft, not distended, not tender  Neuro:  Alert oriented times 4   _______________________________________________________________________  DISCHARGE MEDICATIONS:   Discharge Medication List as of 7/30/2021  9:41 AM      CONTINUE these medications which have NOT CHANGED    Details   elagolix (Orilissa) 150 mg tab Take 150 mg by mouth daily. , Historical Med      aspirin delayed-release 81 mg tablet Take 81 mg by mouth daily. , Historical Med      diclofenac (Voltaren) 1 % gel Apply 2 g to affected area four (4) times daily as needed for Pain. RT KNEE, Historical Med      oxyCODONE IR (ROXICODONE) 5 mg immediate release tablet Take 5 mg by mouth every four (4) hours as needed for Pain., Historical Med      rosuvastatin (Crestor) 40 mg tablet Take 40 mg by mouth daily. , Historical Med      insulin aspart U-100 (NovoLOG U-100 Insulin aspart) 100 unit/mL injection by SubCUTAneous route. USE AS INSTRUCTED PER INSULIN PUMP, Historical Med      glucagon (Glucagon Emergency Kit, human,) 1 mg injection USE AS DIRECTED, Normal, Disp-2 Vial, R-11      carvediloL (COREG) 6.25 mg tablet TAKE ONE TABLET BY MOUTH DAILY, Normal, Disp-90 Tab, R-2      gabapentin (NEURONTIN) 300 mg capsule TAKE TWO CAPSULES BY MOUTH THREE TIMES A DAY, Normal, Disp-180 Cap, R-5      ARIPiprazole (Abilify) 30 mg tablet Take 30 mg by mouth daily. , Historical Med      brexpiprazole (Rexulti) 0.5 mg tab tablet Take 1 mg by mouth two (2) times a day., Historical Med      busPIRone (BUSPAR) 10 mg tablet Take 20 mg by mouth two (2) times a day., Historical Med      fentaNYL (DURAGESIC) 25 mcg/hr PATCH 1 Patch by TransDERmal route every seventy-two (72) hours. , Historical Med      ALPRAZolam (XANAX) 1 mg tablet Take 1 mg by mouth three (3) times daily as needed., Historical Med      promethazine (PHENERGAN) 25 mg tablet TAKE ONE TABLET BY MOUTH EVERY 6 HOURS AS NEEDED FOR NAUSEA, Normal, Disp-30 Tab, R-2      fluticasone propionate (FLONASE) 50 mcg/actuation nasal spray 2 Sprays by Both Nostrils route daily as needed for Rhinitis., Normal, Disp-16 g, R-3      bumetanide (BUMEX) 2 mg tablet Take 2 mg by mouth two (2) times a day., Historical Med      atorvastatin (LIPITOR) 40 mg tablet TAKE ONE TABLET BY MOUTH DAILY, Normal, Disp-90 Tab, R-3      esomeprazole (NEXIUM) 40 mg capsule TAKE ONE CAPSULE BY MOUTH DAILY, Normal, Disp-30 Cap, R-11      insulin pump (PATIENT SUPPLIED) misc by SubCUTAneous route as needed. Insulin Type: NOVOLOG  Basal Dose:  3898-4171: 1.55 units/hr  4162-6897: 1.65 units/hr    Target -7227: 100-130 mg/dL: 7395-7643: 130-150 mg/dL  Corrective Dose:  unit per  mg/dL over  mg/dL  Insulin: Carb ratio:  1 units: 12 grams  Last time insulin pump supplies changed: 21  Last time insulin pump refilled: 21, Historical Med      citalopram (CELEXA) 20 mg tablet Take 40 mg by mouth daily. , Historical Med      traZODone (DESYREL) 50 mg tablet Take 100 mg by mouth nightly., Historical Med      levonorgestrel (MIRENA) 20 mcg/24 hr (5 years) IUD 1 Device by IntraUTERine route once., Historical Med      fentaNYL (DURAGESIC) 100 mcg/hr PATCH 1 Patch by TransDERmal route every seventy-two (72) hours. , Historical Med         STOP taking these medications       ascorbic acid, vitamin C, (Vitamin C) 250 mg chew Comments:   Reason for Stopping:         clopidogreL (Plavix) 75 mg tab Comments:   Reason for Stopping:               My Recommended Diet, Activity, Wound Care, and follow-up labs are listed in the patient's Discharge Insturctions which I have personally completed and reviewed.     ______________________________________________________________________    Risk of deterioration: Moderate    Condition at Discharge:  Stable  ______________________________________________________________________    Disposition  Home with family, no needs  _____________________________    Care Plan discussed with:   Patient, Family, RN, Care Manager, Consultant    Comment:   ______________________________________________________________________  Total NON critical care TIME:  35   Minutes    Code Status: Full Code  ___

## 2021-07-30 NOTE — PROGRESS NOTES
Received message from patient's nurse stating:    Pt requesting Trazadone 100mg. She takes at night at home. She does have it noted on her PTA med list         Discussion / orders:    Ordered trazodone 100 mg by mouth daily at bedtime per PTA home medication list           Please note that this note was dictated using Dragon computer voice recognition software. Quite often unanticipated grammatical, syntax, homophones, and other interpretive errors are inadvertently transcribed by the computer software. Please disregard these errors. Please excuse any errors that have escaped final proofreading.

## 2021-07-30 NOTE — PROGRESS NOTES
DISCHARGE SUMMARY FROM [facility] NURSE    The patient is stable for discharge. I have reviewed the discharge instructions with the patient. The patient verbalized understanding. All questions were fully answered. The patient verbalized no complaints. Hard scripts and medication handouts were given and reviewed with the patient. Appropriate educational materials and medication side effects teaching were provided also provided. The Cardiac monitor and IV line(s) were removed. The following personal items collected during admission were returned to the patient/family  Home medications:    Dental Appliance: Dental Appliances: Uppers, Lowers  Vision: Visual Aid: None  Hearing Aid:    Jewelry: Jewelry: Bracelet, Ring  Clothing: Clothing: Shorts, Shirt, Footwear  Other Valuables: Other Valuables: Cell Phone, Purse  Valuables sent to safe: There were no personal belongings, valuables or home medications left at patient's bedside,  or safe.

## 2021-07-30 NOTE — PROGRESS NOTES
Transition of Care Plan:     RUR: 22% - \"moderate risk\"  Disposition:  Home with f/u apt & resumed home O2 use (3L provided by [employer]ane Monks)    * CM requested for pt to bring portable O2 tank for transport home; pt declined reporting she doesn't need it for transport home, as she only uses it \"with activity\"  Follow up appointments: PCP & specialist as indicated  DME needed: Pt reported having access to home O2 (3L provided by Ariane Brennan). No current DME needs identified for d/c  Transportation at Discharge: Pt's father to provide transport at d/c; father en route  [place] or means to access home: Pt has access to her home       IM Medicare Letter: 2nd IM to be provided prior to d/c  BCPI-A Bundle Letter: N/A  Caregiver Contact: Pt's boyfriend (Neil Shen: [phone])  Discharge Caregiver contacted prior to discharge? To be contacted per pt's request               Initial note: CM ackowledged d/c. CM reviewed pts chart prior to moving forward with d/c planning. CM contacted pt via bedside phone to review plan for anticipated d/c today re: home with f/u apts and resume home O2 use 3l provided by Ariane Brennan. CM confirmed pt is agreeable to the d/c plan; no additional questions, concerns, or needs reported. CM requested for pt to bring portable O2 tank for transport home; pt declined reporting she doesn't need it for transport home, as she only uses it [place] activity. \" Pt reported her father will provide transportation at d/c; father en route. CM contacted pts PCP office ([phone]) to secure LANE apt for d/c; apt secured, details reflected in AVS for reference. CM also placed pts endocrinology f/u apt in pts AVS for reference. Pts AVS is up to date and ready to print. CM has completed the needs of the pt at this time. Care Management Interventions  PCP Verified by CM:  Yes  Palliative Care Criteria Met (RRAT>21 & CHF Dx)?: Yes  Palliative Consult Recommended?: No  Reason Palliative Care Not Recommended?: Palliative Care not utilized by provider  Mode of Transport at Discharge: Other (see comment) (Pts father to provide transport.   Currently en route.)  Transition of Care Consult (CM Consult): Discharge Planning  Discharge Durable Medical Equipment: No  Physical Therapy Consult: No  Occupational Therapy Consult: No  Speech Therapy Consult: No  Current Support Network: Own Home, Other, Lives with Spouse (Pt lives with boyfriend in single story home with 3 RENETTA)  Confirm Follow Up Transport: Self  [place] Resource Information Provided?: No  Discharge Location  Discharge Placement: Home (Home with f/u apt & resumed home O2 use (3L provided by Remus Gallito)  )     Ralph Zambrano, MSN, RN Care Manager   [phone]

## 2021-08-11 RX ORDER — BLOOD SUGAR DIAGNOSTIC
STRIP MISCELLANEOUS
Qty: 250 STRIP | Refills: 11 | Status: SHIPPED
Start: 2021-08-11 | End: 2021-12-09

## 2021-08-11 NOTE — PROGRESS NOTES
Physician Progress Note      PATIENTHonore Host  CSN #:                  [id number]  :                       1970  ADMIT DATE:       2021 11:08 AM  Saira Byrd DATE:        2021 10:25 AM  RESPONDING  PROVIDER #:        Jose Carlson MD          QUERY TEXT:    Dr James Gamez  Pt admitted with DKA and has CHF documented. If possible, please document in progress notes and discharge summary further specificity regarding the type and acuity of CHF:    The medical record reflects the following:  Risk Factors: presents with DKA,  Clinical Indicators: history of CHF noted, not in exacerbation  Treatment: continue Home Bumex  Options provided:  -- Chronic Systolic CHF/HFrEF  -- Chronic Diastolic CHF/HFpEF  -- Chronic Systolic and Diastolic CHF  -- Other - I will add my own diagnosis  -- Disagree - Not applicable / Not valid  -- Disagree - Clinically unable to determine / Unknown  -- Refer to Clinical Documentation Reviewer    PROVIDER RESPONSE TEXT:    This patient has chronic diastolic CHF/HFpEF. Query created by:  Charlene Avilez on 2021 4:04 PM      Electronically signed by:  Jose Carlson MD 2021 5:49 AM

## 2021-09-02 RX ORDER — NAPROXEN SODIUM 220 MG
TABLET ORAL
Qty: 100 EACH | Refills: 11 | Status: SHIPPED
Start: 2021-09-02 | End: 2022-03-10

## 2021-09-14 ENCOUNTER — OFFICE VISIT (OUTPATIENT)
Dept: ENDOCRINOLOGY | Age: 51
End: 2021-09-14
Payer: MEDICARE

## 2021-09-14 VITALS
DIASTOLIC BLOOD PRESSURE: 68 MMHG | SYSTOLIC BLOOD PRESSURE: 137 MMHG | BODY MASS INDEX: 36.57 KG/M2 | HEART RATE: 80 BPM | WEIGHT: 214.2 LBS | HEIGHT: 64 IN

## 2021-09-14 DIAGNOSIS — R79.89 ELEVATED LFTS: ICD-10-CM

## 2021-09-14 DIAGNOSIS — I10 ESSENTIAL HYPERTENSION: ICD-10-CM

## 2021-09-14 DIAGNOSIS — R94.6 BORDERLINE ABNORMAL THYROID FUNCTION TEST: ICD-10-CM

## 2021-09-14 DIAGNOSIS — E78.5 HYPERLIPIDEMIA LDL GOAL <100: ICD-10-CM

## 2021-09-14 PROCEDURE — 3017F COLORECTAL CA SCREEN DOC REV: CPT | Performed by: INTERNAL MEDICINE

## 2021-09-14 PROCEDURE — 2022F DILAT RTA XM EVC RTNOPTHY: CPT | Performed by: INTERNAL MEDICINE

## 2021-09-14 PROCEDURE — G8752 SYS BP LESS 140: HCPCS | Performed by: INTERNAL MEDICINE

## 2021-09-14 PROCEDURE — G9717 DOC PT DX DEP/BP F/U NT REQ: HCPCS | Performed by: INTERNAL MEDICINE

## 2021-09-14 PROCEDURE — 99214 OFFICE O/P EST MOD 30 MIN: CPT | Performed by: INTERNAL MEDICINE

## 2021-09-14 PROCEDURE — G8427 DOCREV CUR MEDS BY ELIG CLIN: HCPCS | Performed by: INTERNAL MEDICINE

## 2021-09-14 PROCEDURE — 3046F HEMOGLOBIN A1C LEVEL >9.0%: CPT | Performed by: INTERNAL MEDICINE

## 2021-09-14 PROCEDURE — G8754 DIAS BP LESS 90: HCPCS | Performed by: INTERNAL MEDICINE

## 2021-09-14 PROCEDURE — G8417 CALC BMI ABV UP PARAM F/U: HCPCS | Performed by: INTERNAL MEDICINE

## 2021-09-14 NOTE — PROGRESS NOTES
Chief Complaint   Patient presents with    Diabetes     pcp and pharmacy confirmed    Other     consent form signed for eye report     History of Present Illness: Timothy Aguilar is a 49 y.o. female here for follow up of diabetes. Current settings are as follows:  - basal: 12a: 1.55, 12p: 1.45, 2p: 1.7  - Carb ratio: 12a: 15  - sensitivity: 15  - target: 12a: 130-150, 6:30a: 100-130, 6p: 130-149  - active insulin time: 3 hours    she has the following indications to continue treatment with freestyle ellen:  1) she has type 1 diabetes (E10.65) and is on an intensive insulin regimen with an insulin pump  2) she tests her blood sugar 8 times per day and makes treatment decisions off her blood sugar readings and her sensor readings  3) she requires frequent adjustments to her insulin pump setttings based on her sensor readings  4) she has benefitted from therapeutic continuous glucose monitoring and I recommend that she continue this  5) she is seen in my office every 3 months     Since being out of the hospital, her pump cracked in August and she got a new pump but when she set up the settings it appears she put the sensitivity at 15 instead of 20 and states when she is correcting for high sugars especially overnight, it's causing her to go low. She just got the new Medtronic 770G pump last week and needs to be trained on this. Her sugars are staying in the 150 range during the day. Her swelling has been controlled on the bumex. Current Outpatient Medications   Medication Sig    Insulin Syringe-Needle U-100 (BD Insulin Syringe Ultra-Fine) 0.5 mL 31 gauge x 5/16\" syrg USE ONE SYRINGE - FOUR TIMES A DAY    elagolix (Orilissa) 150 mg tab Take 150 mg by mouth daily.  aspirin delayed-release 81 mg tablet Take 81 mg by mouth daily.  diclofenac (Voltaren) 1 % gel Apply 2 g to affected area four (4) times daily as needed for Pain.  RT KNEE    oxyCODONE IR (ROXICODONE) 5 mg immediate release tablet Take 5 mg by mouth every four (4) hours as needed for Pain.  rosuvastatin (Crestor) 40 mg tablet Take 40 mg by mouth daily.  insulin aspart U-100 (NovoLOG U-100 Insulin aspart) 100 unit/mL injection by SubCUTAneous route. USE AS INSTRUCTED PER INSULIN PUMP    glucagon (Glucagon Emergency Kit, human,) 1 mg injection USE AS DIRECTED    carvediloL (COREG) 6.25 mg tablet TAKE ONE TABLET BY MOUTH DAILY    gabapentin (NEURONTIN) 300 mg capsule TAKE TWO CAPSULES BY MOUTH THREE TIMES A DAY    ARIPiprazole (Abilify) 30 mg tablet Take 30 mg by mouth daily.  brexpiprazole (Rexulti) 0.5 mg tab tablet Take 1 mg by mouth two (2) times a day.  busPIRone (BUSPAR) 10 mg tablet Take 20 mg by mouth two (2) times a day.  fentaNYL (DURAGESIC) 25 mcg/hr PATCH 1 Patch by TransDERmal route every seventy-two (72) hours.  ALPRAZolam (XANAX) 1 mg tablet Take 1 mg by mouth three (3) times daily as needed.  promethazine (PHENERGAN) 25 mg tablet TAKE ONE TABLET BY MOUTH EVERY 6 HOURS AS NEEDED FOR NAUSEA    fluticasone propionate (FLONASE) 50 mcg/actuation nasal spray 2 Sprays by Both Nostrils route daily as needed for Rhinitis.  bumetanide (BUMEX) 2 mg tablet Take 2 mg by mouth two (2) times a day.  atorvastatin (LIPITOR) 40 mg tablet TAKE ONE TABLET BY MOUTH DAILY    esomeprazole (NEXIUM) 40 mg capsule TAKE ONE CAPSULE BY MOUTH DAILY    insulin pump (PATIENT SUPPLIED) misc by SubCUTAneous route as needed. Insulin Type: NOVOLOG  Basal Dose:  6519-5313: 1.55 units/hr  7965-8787: 1.65 units/hr    Target -7327: 100-130 mg/dL: 0420-7060: 130-150 mg/dL  Corrective Dose:  unit per  mg/dL over  mg/dL  Insulin: Carb ratio: 1 units: 12 grams  Last time insulin pump supplies changed: 21  Last time insulin pump refilled: 21    citalopram (CELEXA) 20 mg tablet Take 40 mg by mouth daily.  traZODone (DESYREL) 50 mg tablet Take 100 mg by mouth nightly.     levonorgestrel (MIRENA) 20 mcg/24 hr (5 years) IUD 1 Device by IntraUTERine route once.  fentaNYL (DURAGESIC) 100 mcg/hr PATCH 1 Patch by TransDERmal route every seventy-two (72) hours.  Accu-Chek Leti Plus test strp strip Use as directed to test up to 8 times per day. Dx E10.65 (Patient not taking: Reported on 9/14/2021)     No current facility-administered medications for this visit. Allergies   Allergen Reactions    Metolazone Other (comments)     Caused hyponatremia    Zocor [Simvastatin] Myalgia    Lisinopril Cough    Medrol [Methylprednisolone] Other (comments)     Caused severe hyperglycemia with the medrol pack     Review of Systems: PER HPI    Physical Examination:  Blood pressure 137/68, pulse 80, height 5' 4\" (1.626 m), weight 214 lb 3.2 oz (97.2 kg). - General: pleasant, no distress, good eye contact   - Neck: no carotid bruits  - Cardiovascular: regular, normal rate, nl s1 and s2, no m/r/g,   - Respiratory: clear bilaterally  - Integumentary: no edema,   - Psychiatric: normal mood and affect    Data Reviewed:   Component      Latest Ref Rng & Units 7/28/2021 7/28/2021 7/28/2021           8:57 AM  8:57 AM  8:57 AM   Glucose      65 - 99 mg/dL 466 (H)     BUN      6 - 24 mg/dL 28 (H)     Creatinine      0.57 - 1.00 mg/dL 1.57 (H)     GFR est non-AA      >59 mL/min/1.73 38 (L)     GFR est AA      >59 mL/min/1.73 44 (L)     BUN/Creatinine ratio      9 - 23 18     Sodium      134 - 144 mmol/L 132 (L)     Potassium      3.5 - 5.2 mmol/L 5.2     Chloride      96 - 106 mmol/L 89 (L)     CO2      20 - 29 mmol/L 22     Calcium      8.7 - 10.2 mg/dL 9.6     Protein, total      6.0 - 8.5 g/dL 7.1     Albumin      3.8 - 4.8 g/dL 4.4     GLOBULIN, TOTAL      1.5 - 4.5 g/dL 2.7     A-G Ratio      1.2 - 2.2 1.6     Bilirubin, total      0.0 - 1.2 mg/dL 0.5     Alk.  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): Most recent Hgb A1c was 9.1% in 7/21 down from 9.6% in 4/21 stable from 1/21 up from 9.4% in 7/20 down from 9.5% in 12/19 down from 10.6% in 9/19 up from 9.3% in 7/19 down from 10.1% in 5/19 up from 10% in 1/19 up from 9.2% in 10/18 up from 9% in 7/18 up from 8.2% in 4/18 down from 8.8% in 11/17 up from 7.4% in 9/17 down from 9.4% in 2/17 up from 9.1% in 11/16 down from 10% in 3/16 up from 9.4% in 12/15 up from 9.2% in 9/15 down from 9.7% in 5/15 down from 10.3% in 1/15 up from 8.9% in 10/14 down from 11.1% in 4/14 up from 9.2% in 1/14 up from 8.6% in March 2013 down from 8.9% in November down from 9.7% in Aug 2012 up from 9.2% in October 2011 up from 8.7% in May down from 9.7% in March up from 9.1% in July 2010 down from 10.2% in January down from 13.7% in October 2009. Made her sensitivity less aggressive to avoid lows after correcting for highs.  - cont current pump settings aside from change below  - check bs 8 times daily due to fluctuating blood sugars  - foot exam done 9/19  - microalbumin was 69 in 7/10 up to 392 in March 2011 and 621 in May down to 216 in October 2011 and 37.3 in 4/14 and up to 183 in 12/15 and 300 in 9/16, down to 85 in 4/18 and 10.2 in 9/19 and normal ever since, last in 1/21  - optho UTD 3/18--due now  - check A1c and cmp and microalbumin prior to next visit      2) HTN NOS (401.9): Blood pressure was at goal < 140/90 at last visit  - cont current regimen      3) Hyperlipidemia (272.4): Given DM, goal LDL is < 100, non-HDL < 130, and TGs < 150. LDL was 146 in January 2010, down to 124 in July and 93 in March 2011 with taking 5 mg of crestor daily. Her crestor was changed to pravastatin by Dr. Manju Anderson because of cost in Feb 2012. LDL 93 in 8/12 but her HDL was high at 138 due to ETOH intake. LDL down to 39 in 11/12 but up to 102 in 3/13.  Off pravastatin at this time and previously was on 10 mg daily.  in 1/14. Up to 167 in 4/14 so started lovastatin 20 mg daily but she couldn't afford this. She has been started on crestor 40 mg daily by crossover clinic and LDL 64 in 10/14 and 55 in 1/15. Was 37 in 3/16 so dose decreased to 20 mg daily and LDL 23 in 2/17. Was changed to lipitor 1/2 of 80 mg daily when clinic couldn't get crestor any longer and LDL 37 in 9/17. This was stopped during her hospital stay in 11/17 but was restarted by Dr. Vladimir Monroe in 12/17 and 42 in 4/18. Up to 94 in 8/18. Down to 79 in 1/19 and 88 in 9/19 and 78 in 12/19 and 61 in 7/20 and 54 in 1/21  - cont lipitor 40 mg daily  - check lipids prior to next visit        4) Elevated LFTs (790.6): I told her previously that her LFTs were elevated likely due to ETOH intake so I advised her to cut back on this and her repeat LFTs were normal in 3/13 and 1/14 and 4/14 and 10/14 and 1/15 and 12/15 and 3/16. AST up to 76 in 2/17 but back to normal in 5/17 and has been normal ever since so will follow this. 5) Borderline abnormal TFT: TSH 2.1 in 12/11 but up to 4.01 in 11/12 and 4.75 in 11/17 during 2 hospital stays. Up to 5.45 in 1/19 with Dr. Vladimir Monroe. Repeat TSH 1.24 and FT4 0.95 and TPO ab 21 in 2/19 so held on any treatment. TSH up to 3.36 in 9/19 but down to 2.61 in 12/19 and 1.59 in 7/19 and 1.48 in 7/21  - check TSH prior to next visit    Patient Instructions   1) Current settings are as follows:  - basal: 12a: 1.55, 12p: 1.45, 2p: 1.7  - Carb ratio: 12a: 15  - sensitivity: 20  - target: 12a: 130-150, 6:30a: 100-130, 6p: 130-149  - active insulin time: 3 hours    I changed your sensitivity from 15 back to 20 (the higher the number, the less insulin is given to correct for highs). 2) I will place an order for training for your new pump and they should reach out to you in the next few days to get this set up.         Follow-up and Dispositions    · Return 11/18/21 at 10:30am. Copy sent to:  Dr. Shaan Knox

## 2021-09-14 NOTE — PATIENT INSTRUCTIONS
1) Current settings are as follows:  - basal: 12a: 1.55, 12p: 1.45, 2p: 1.7  - Carb ratio: 12a: 15  - sensitivity: 20  - target: 12a: 130-150, 6:30a: 100-130, 6p: 130-149  - active insulin time: 3 hours    I changed your sensitivity from 15 back to 20 (the higher the number, the less insulin is given to correct for highs). 2) I will place an order for training for your new pump and they should reach out to you in the next few days to get this set up.

## 2021-09-21 RX ORDER — BLOOD SUGAR DIAGNOSTIC
STRIP MISCELLANEOUS
Qty: 250 STRIP | Refills: 11 | Status: SHIPPED | OUTPATIENT
Start: 2021-09-21

## 2021-09-27 RX ORDER — LANCETS
EACH MISCELLANEOUS
Qty: 800 EACH | Refills: 3 | Status: SHIPPED
Start: 2021-09-27 | End: 2021-09-27 | Stop reason: CLARIF

## 2021-09-27 RX ORDER — LANCETS
EACH MISCELLANEOUS
Qty: 800 EACH | Refills: 3 | Status: SHIPPED | OUTPATIENT
Start: 2021-09-27

## 2021-09-30 ENCOUNTER — OFFICE VISIT (OUTPATIENT)
Dept: DIABETES SERVICES | Age: 51
End: 2021-09-30

## 2021-09-30 NOTE — PROGRESS NOTES
[facility]  Diabetes Self-Management Education & Support Program  Encounter note    SUMMARY  Diabetes self-care management training was completed related to Monitoring and Taking medications. The participant will return on October 6 to continue DSMES related to Monitoring and Taking medications. The participant did not identify SMART Goal(s): - no goal set at this sesson, and will practice knowledge and skills related to healthy eating and monitoring and medications to improve diabetes self-management. EVALUATION:  Pt is here for her pump upgrade from a GZSRNHNEI 109 G to a 770 G. She has brought all supplies and she was placed on her new pump as well as the sensor. She is not using the [facility] due to the scars it was leaving on her arm for the past week. Her father is present with her and she is excited about the new system. Will spend more time next session to more comfortable with button pushing. RECOMMENDATIONS:  1. Download Carelink software at home-pt reports no wi fi at home currently-will download when she returns next week  2. Calibrate 2-3 times a day for optimal sensor therapy  3. Enter carbs accurately     Next provider visit is scheduled for 11/10/21               DATE DSMES TOPIC EVALUATION     9/30/2021 HOW CAN BLOOD GLUCOSE MONITORING HELP ME?   a. Value of blood glucose monitoring   b. Realistic expectations   c. Differences between sensor and blood glucose values. d. Setting alerts on sensor for high/low/out of range  e. Using sensor glucose levels for treatment decisions. f. Using glucometer for treatment decisions   g. Use of sensor trend arrows when dosing insulin   h. Sensor sites on body and relative to infusion sets  i. Tips for improving adhesion    j. Downloading and using smartphone apps  k. Data sharing with provider      Pt placed the sensor without issues and needed assistance with the taping.   Transmitter is paired with her pump as well as her new meter. She is in warm up and is aware she will need to calibrate when warm up ends, again within 6 hours and then every 12 hours. Discussed ideally just check before each meal and at bedtime but no more than 4 calibrations a day is needed. She is used to checking 8 times a day and there is no benefit to calibrating that often. DATE DSMES TOPIC EVALUATION     9/30/2021 HOW DO MY DIABETES MEDICATIONS WORK? Insulin pump therapy medications & methods    Insulin pump benefits and limitations   Understanding basal and bolus insulin   Navigating menus    Insulin pump settings   Infusion sets and site selection   Sensor and pump integration if applicable   Pump and smartphone apps   Troubleshooting hypo and hyperglycemia   Glucagon emergency kits   Medical procedures and warnings/warranty   Traveling with insulin pump and medications.  Barriers to medication adherence. Pt is using Medtronic 770 G insulin pump with/ Guardian sensor /without Automode. Pt is using the Quickset  infusion set with cannula length 6 mm mm and 0.3 units fill amount.  Current settings for insulin pump are as follows:       Basal Time Segment (start - end time)   Units/hour    12:00 am to 12:00 pm  1.55    12:00 pm  to 2: 00 pm  1.45    2:00 pm  to 12:00 am  1.70           Bolus Wizard Settings:  Carb Ratio start-end time   1 unit: grams carb   12 am to 12 am  15           Insulin Sensitivity Factor start-end time   1 unit: mg/dl   12 am to 12 am   20      Target BG start-end time   1 unit: mg/dl    12 am to 6:30 am    130-150    6:30 am to 6pm   100-130   6 pm to midnight 130-150          Active insulin time: 3 hours Max Basal Rate: 2 units/hour   Max Bolus: 25 units    Sensor settings:  Low alert: 90 mg/dl and suspend before low is on; High alert is 300 mg/dl           Celso Cogan, RD , [facility] on 9/30/2021 at 2:30 PM      Encounter date: 9/30/2021  Time in appointment: 100 minutes billed to pump company per pump contract

## 2021-10-01 ENCOUNTER — TELEPHONE (OUTPATIENT)
Dept: DIABETES SERVICES | Age: 51
End: 2021-10-01

## 2021-10-01 NOTE — TELEPHONE ENCOUNTER
Pt LVM last night at 5;32pm that she was excited that the pump had alerted her her BS was low. Per pt she gave a bolus for a BS of 243 mg/dl earlier in evening and BS went to  70 mg/dl treated with 30 gms of juice and BS went to 48 mg/dl and treated again when she finally got it to 180 mg/dl. She feels the ISF may need adjusted. Call today was to say her sensor signal was lost so helped her trouble shoot this issue but by time call was done sensor signal had not been reestablished. Shared she may have to replace the sensor and to call [employer] if she has to do this so they will replace the sensor. She can also call technical support as well to see if they can help her get signal reestablished. Also she has just given a bolus for her morning elevated BS and will let me know if this causes a low BS again. If so will adjust the ISF per protocol on pump orders. Pt verbalized understanding of troubleshooting and will call if low BS again due to correction.      Renae Merino RD, [facility]

## 2021-10-04 ENCOUNTER — OFFICE VISIT (OUTPATIENT)
Dept: DIABETES SERVICES | Age: 51
End: 2021-10-04

## 2021-10-04 NOTE — PROGRESS NOTES
[address] [place] [address] for Diabetes Health  Diabetes Self-Management Education & Support Program  Encounter note    SUMMARY  Diabetes self-care management training was completed related to Monitoring. The participant will return on October 13 to continue DSMES related to Monitoring and Taking medications. EVALUATION:  Pt's Medtronic Guardian 3 transmitter failed on Friday; she received the new one on Saturday and is here today to resume sensor therapy. She had the transmitter fully charged prior to the visit. Transmitter was paired and she demonstrated understanding of sensor placement with some assistance. Showed her the Getting started books in her kit and showed her the quick reference sheets to help her in the future especially with the taping. She was in warm up when she left. She could verbalize the need to check BS and calibrate. Due to needing data for auto mode to start her Wednesday appt was postponed to next week to get auto mode started. RECOMMENDATIONS:  1. Get auto mode started next week  2. Adjusted ISF to 22 from 20  per pump start orders allowing 10-20 % adjustment due to low BS after covering elevated BS. Pt very worried about numbers in the 80's                     DATE DSMES TOPIC EVALUATION     10/4/2021 HOW CAN BLOOD GLUCOSE MONITORING HELP ME?   a. Value of blood glucose monitoring   b. Realistic expectations   c. Differences between sensor and blood glucose values. d. Setting alerts on sensor for high/low/out of range  e. Using sensor glucose levels for treatment decisions. f. Using glucometer for treatment decisions   g. Use of sensor trend arrows when dosing insulin   h. Sensor sites on body and relative to infusion sets  i. Tips for improving adhesion    j. Downloading and using smartphone apps  k.  Data sharing with provider          The participant    Can demonstrate placement of her sensor Yes    Applies tape correctly Yes    The participant would benefit from None    Will download at next visit to see trends for low BS and high BS.              Raine Mccoy RD , [facility][note 3] on 10/4/2021 at 1:43 PM    Encounter date: 10/4/2021  Time in appointment: 35 minutes no billing to insurance; reimbursed by pump company per contract

## 2021-10-05 ENCOUNTER — TELEPHONE (OUTPATIENT)
Dept: DIABETES SERVICES | Age: 51
End: 2021-10-05

## 2021-10-05 NOTE — TELEPHONE ENCOUNTER
Pt called this am to report she noticed her pump was suspended a lot over night and feels her settings need changed. Since she just resumed sensor therapy will not see a download till next week. She does not have Carelink set up at home due to no wifi. She may need to be more comfortable with numbers around 12 mg/dl  then she has in the past at that is where the pump is going to target in auto mode once it is turned on. She reports she had dinner and gave the bolus and was down to 48 mg/dl at 10 pm.  She is treating with 30 gms of juice.      Asked her to give it one more day and see if she continues to be suspended a lot and if so will see about making adjustments to her settings

## 2021-10-06 ENCOUNTER — TELEPHONE (OUTPATIENT)
Dept: DIABETES SERVICES | Age: 51
End: 2021-10-06

## 2021-10-06 RX ORDER — CARVEDILOL 6.25 MG/1
TABLET ORAL
Qty: 90 TABLET | Refills: 3 | Status: SHIPPED | OUTPATIENT
Start: 2021-10-06 | End: 2022-09-15

## 2021-10-06 NOTE — TELEPHONE ENCOUNTER
Pt LVM that she had more low BS yesterday. Called her back and had her make the following setting changes:    Basal rate changes:   mn from 1.55 to 1.40  Noon from 1.45 t 1.3  2pm from 1.7 to 1.5     ISF from 22 to 24    Completed per allowed 10% reduction based on pump orders    Pt reports BS was 38 mg/dl  yesterday morning and went from 98 to 32 mg/dl around 3:30pm (suspend before low was working) and she took a glucagon injection because she could not get it above 60 mg/dl. Checked carb counting skills briefly and suspect she may be reading the wgt next to the serving size as the carbs sometimes. Will check this with actual labels at session next week. She assures me she always uses labels for carb counting. She still loves the pump and is not discouraged with the low BS at this time. She is eager to try auto mode.

## 2021-10-07 ENCOUNTER — TELEPHONE (OUTPATIENT)
Dept: DIABETES SERVICES | Age: 51
End: 2021-10-07

## 2021-10-07 NOTE — TELEPHONE ENCOUNTER
Pt LM having low BS after meals. Called her back and made change to her carb ratio from 1:15 to 1:18 per insulin pump orders allowing 10-20 % change.

## 2021-10-11 ENCOUNTER — TELEPHONE (OUTPATIENT)
Dept: DIABETES SERVICES | Age: 51
End: 2021-10-11

## 2021-10-11 NOTE — TELEPHONE ENCOUNTER
Pt LVM she wants to change her target to 130-150 all day long instead of 100-130 mg/dl as she was low all weekend long. I called her and assisted her through this change and also made a ISF change to 28 from 24  based on pump orders allowing 10-20 % change. .     She is due in Wednesday for a download and will also check carb counting accuracy.

## 2021-10-13 ENCOUNTER — OFFICE VISIT (OUTPATIENT)
Dept: DIABETES SERVICES | Age: 51
End: 2021-10-13

## 2021-10-13 RX ORDER — GABAPENTIN 300 MG/1
CAPSULE ORAL
Qty: 180 CAPSULE | Refills: 5 | Status: SHIPPED | OUTPATIENT
Start: 2021-10-13 | End: 2021-11-04 | Stop reason: SDUPTHER

## 2021-10-13 NOTE — PROGRESS NOTES
[facility]  Diabetes Self-Management Education & Support Program  Encounter note    SUMMARY  Diabetes self-care management training was completed related to Monitoring and Taking medications. The participant will return on TBD once Carelink is back up so we can download   to continue DSMES related to Taking medications/ insulin pump therapy. EVALUATION:  Pt is here today for her auto mode start. Attempted to download but Carelink is down so we sent up her account and once she gets it set up at home she will accept the invite to link to [employer] account. She has checked with [employer] and her current smart phone is not compatible with the josé miguel. She called [employer] this morning and they are sending her the blue download adapter she needs. She reports since we adjusted the target range and the ISF earlier this week she has not had many low BS and her pump has been suspending before low appropriately. She is very happy with the pump and feels like she will be seeing better BS soon with less low BS. Pt was in Auto Mode when she left the office today. She practiced suspending the pump for when she is disconnected and she practiced putting in a temp target for when she is exercising or more active    RECOMMENDATIONS:  Set up [address] on her computer and download at her friends home where the wifi is good  Accept the Carelink invite to link to our professional acct  Download at home once Carelink is back up  Will call when able to download and educator will review and make setting changes as needed     Next provider visit is scheduled for 11/18/21               DATE DSMES TOPIC EVALUATION     10/13/2021 HOW CAN BLOOD GLUCOSE MONITORING HELP ME?   a. Value of blood glucose monitoring   b. Realistic expectations   c. Differences between sensor and blood glucose values. d. Setting alerts on sensor for high/low/out of range  e.  Using sensor glucose levels for treatment decisions. f. Using glucometer for treatment decisions   g. Use of sensor trend arrows when dosing insulin   h. Sensor sites on body and relative to infusion sets  i. Tips for improving adhesion    j. Downloading and using smartphone apps  k. Data sharing with provider        The participant    Can demonstrate their glucometer procedure Yes   Logs their BG readings per download of pump when able  . DATE DSMES TOPIC EVALUATION     10/13/2021 HOW DO MY DIABETES MEDICATIONS WORK?    a. How does auto basal work   b. How do I know I am in auto mode   c. Troubleshooting in auto mode   d. Setting up Carelink for downloads   The participant    Can describe the expected action of insulin pumpYes. Used License Bud[employer] and power point presentation to complete training             Ioana Menchaca RD , [facility] n 10/13/2021 at 10:50 AM    I have personally reviewed the health record, including provider notes, laboratory data and current medications before making these care and education recommendations. The time spent in this effort is included in the total time.   Total minutes: 60 minutes no charges dropped; billed in to insulin pump company per pump contract

## 2021-10-14 ENCOUNTER — TELEPHONE (OUTPATIENT)
Dept: DIABETES SERVICES | Age: 51
End: 2021-10-14

## 2021-10-14 NOTE — TELEPHONE ENCOUNTER
Pt called to say she is currently 52 mg/dl. She went to the dentist and had a yogurt this morning for 24 gms carb that she bolused for it. She is treating it and it is coming up. She felt the auto mode had not shut off so had her look at the graph and she could see that the pump had stopped delivering insulin when she got to 130 mg/dl but I suspect her carb ratio is still not right but until we can download would like to hold on changing.      She was very excited that she stayed 130 mg/dl all night long as well up to breakfast this am. Reports that this  is as first in 20 years

## 2021-10-18 ENCOUNTER — TELEPHONE (OUTPATIENT)
Dept: DIABETES SERVICES | Age: 51
End: 2021-10-18

## 2021-10-18 NOTE — TELEPHONE ENCOUNTER
Pt LVM on Saturday that \"we are going to need to set up a temp bolus\" because she went shopping to [employer] and [employer] and BS got down to 86 mg/dl. She treated with 60 gms carb and it went to 82 mg/dl and she treated with juice and 2 glucose tabs and it went to 120 mg/dl. Pt called a second time to report she had slept through the night for the first time in 10 years and was excited to share this news    LVM reminding her she has a temp target feature she needs to turn on when she goes out to shop. She can set it for whatever duration of time she thinks she will be active.   This will increase her auto mode target to 150 mg/dl from 120 mg/dl

## 2021-10-21 ENCOUNTER — TELEPHONE (OUTPATIENT)
Dept: DIABETES SERVICES | Age: 51
End: 2021-10-21

## 2021-10-21 NOTE — TELEPHONE ENCOUNTER
Pt has completed her first Care[facility] download for auto mode and I reviewed it and shared the results with the patient. (Scanned to media tab)    She realizes that she is over treating her low BS and is still learning to trust the pump. The download shows no issues with low BS since auto mode was started. Meal bolus shows she is not getting enough insulin with meals and per orders a carb ratio adjustment was made from 1:18 to 1:15. Pt will also work on less carb when she gets to 100 mg/dl and not panic. Download again on Tuesday October 25th and will review for 2nd auto mode download follow up.

## 2021-10-27 ENCOUNTER — TELEPHONE (OUTPATIENT)
Dept: DIABETES SERVICES | Age: 51
End: 2021-10-27

## 2021-10-27 NOTE — TELEPHONE ENCOUNTER
Pt completed download into [employer] yesterday as requested. Reviewed new report and shows large improvement in time in range from last week with change to 1:15 carb ratio and has not been having to treat low BS. Only one low BS seen when she needed to change the sensor in the middle of the night and fell back a sleep and did not calibrate so was out of auto mode. Time in Range : 81 %  Goal >80 % up from 56% last week    No low BS pattern this past week    Meal bolus report shows there is room for improvement in the lunch hour so had her add a 1:14 carb ratio between 11 am and 3 pm.     Her Medtronic 770 G insulin pump training is complete at this time. Pt will call if she has any issues.      See media tab for training paperwork on Auto mode download for today    Daniel Ryan RD, [facility], CPT

## 2021-10-29 RX ORDER — INSULIN ASPART 100 [IU]/ML
INJECTION, SOLUTION INTRAVENOUS; SUBCUTANEOUS
Qty: 30 ML | Refills: 11 | Status: SHIPPED | OUTPATIENT
Start: 2021-10-29 | End: 2022-04-28 | Stop reason: CLARIF

## 2021-11-03 ENCOUNTER — TELEPHONE (OUTPATIENT)
Dept: DIABETES SERVICES | Age: 51
End: 2021-11-03

## 2021-11-03 NOTE — TELEPHONE ENCOUNTER
Pt's father called me on her cell to let me know she fell this morning and the pump fell off and she could not find it so her BS was in the 500's. She is at [facility] ED right now and the pump is back on and so is the sensor but pt is a bit confused so not sure if pump and sensor working together currently. They will keep me posted but prior to this her BS were running \"perfectly\"  Her father or patient will update me and let me know if any help is needed with the pump post discharge. She is being worked up for the falls at this time.      Lyly Funk RD, [facility]

## 2021-11-04 ENCOUNTER — TELEPHONE (OUTPATIENT)
Dept: ENDOCRINOLOGY | Age: 51
End: 2021-11-04

## 2021-11-04 DIAGNOSIS — R79.89 ELEVATED LFTS: ICD-10-CM

## 2021-11-04 DIAGNOSIS — E78.5 HYPERLIPIDEMIA LDL GOAL <100: ICD-10-CM

## 2021-11-04 DIAGNOSIS — R94.6 BORDERLINE ABNORMAL THYROID FUNCTION TEST: ICD-10-CM

## 2021-11-04 DIAGNOSIS — I10 ESSENTIAL HYPERTENSION: ICD-10-CM

## 2021-11-04 RX ORDER — GABAPENTIN 300 MG/1
600 CAPSULE ORAL 3 TIMES DAILY
Qty: 540 CAPSULE | Refills: 1 | Status: SHIPPED | OUTPATIENT
Start: 2021-11-04 | End: 2022-03-10

## 2021-11-04 NOTE — TELEPHONE ENCOUNTER
Called and spoke with pt. She states she fell 7 times yesterday and broke her pump and her accu-check guide meter. She was admitted to [facility] overnight and treated with an insulin drip and then given 10 units of lantus at noon today and discharged home. She does not have a way to check her sugars since her meter broke so I told her she can come to the office this afternoon and  a sample accu-check guide meter. She is due to have a new pump shipped to her tomorrow and I told her I would provide instructions of how much novolog to take overnight until her new pump arrives tomorrow and can be programmed.   Wrote her the following instructions in a letter:    Once your new pump arrives, please program your settings as follows:  - basal: 12a: 1.4, 12p: 1.3, 2p: 1.5  - Carb ratio: 12a: 15, 11a: 14, 3p: 15  - sensitivity: 12a-12a: 24  - target: 12a-12a: 130-150  - active insulin time: 3 hours  - max basal rate: 2 units/hr  - max bolus: 25 units    Until your pump arrives, you can dose your novolog as follows:  - give 6 units every 4 hours to use as basal (you will need to set an alarm in the middle of the night to give a dose)  - dose your novolog 1 unit for 15 grams of carbs  - if your sugar is over 150, take 1 unit for every 25 points over 150 for correction

## 2021-11-04 NOTE — TELEPHONE ENCOUNTER
Patient called this morning concerned she was in DKA. She states that she has taken her insulin but her glucose is greater than 400 and she is slurring her words. I strongly advised her to call 911 and get to the closest ER. She suggested that she could drive but I advised her that that was not a good idea. She will call 911.
no

## 2021-11-04 NOTE — LETTER
11/4/2021 4:04 PM 
 
Ms. Sonam Jeffrey [address] [facility] [address] Once your new pump arrives, please program your settings as follows: 
- basal: 12a: 1.4, 12p: 1.3, 2p: 1.5 
- Carb ratio: 12a: 15, 11a: 14, 3p: 15 
- sensitivity: 12a-12a: 24 
- target: 12a-12a: 130-150 
- active insulin time: 3 hours 
- max basal rate: 2 units/hr 
- max bolus: 25 units Until your pump arrives, you can dose your novolog as follows: 
- give 6 units every 4 hours to use as basal (you will need to set an alarm in the middle of the night to give a dose) - dose your novolog 1 unit for 15 grams of carbs - if your sugar is over 150, take 1 unit for every 25 points over 150 for correction Please call me tonight at [phone] with any questions.  
 
 
Sincerely, 
 
 
Yenni Branch MD

## 2021-11-04 NOTE — TELEPHONE ENCOUNTER
----- Message from Ochoa Paredes sent at 11/4/2021  1:19 PM EDT -----  Regarding: /Telephone  General Message/Vendor Calls         Caller's first and last name: Self               Reason for call: Pt just was discharged from hospital & needs returned call from Dr or nurse about how much fast acting insulin to be given to her until tomorrow because pt pump does not come in until tomorrow                 Callback required yes/no and why: Yes               Best contact number(s): [phone]              Details to clarify the request: Pt just was discharged from hospital & needs returned call from Dr or nurse about how much fast acting insulin to be given to her until tomorrow because pt pump does not come in until tomorrow                      Ochoa Paredes

## 2021-11-07 ENCOUNTER — TELEPHONE (OUTPATIENT)
Dept: ENDOCRINOLOGY | Age: 51
End: 2021-11-07

## 2021-11-07 NOTE — TELEPHONE ENCOUNTER
**LATE ENTRY--PHONE CALL TOOK PLACE ON 11/6/21 AT 12:05PM**    Patient paged me stating that she was able to get her new pump and had programmed her settings appropriately but she didn't think she was in automode as she didn't see the shield. I told her that I didn't know how to help her ensure she is in automode and advised her to call [employer] to help troubleshoot and she said she would do so. She did say that her sugar was in the 120s at the time of the call. It appears she has an appointment with Marc Pandya with the [facility] tomorrow so I will forward her this message to ensure she is able to assist patient getting back into automode.

## 2021-11-18 ENCOUNTER — TELEPHONE (OUTPATIENT)
Dept: ENDOCRINOLOGY | Age: 51
End: 2021-11-18

## 2021-11-18 NOTE — TELEPHONE ENCOUNTER
Dr. Janet Batista,  When do you want to see her?     [facility]  ----- Message from Radha Rachelljack sent at 11/18/2021  1:28 PM EST -----  Regarding: \telephone  Contact: [phone]  Appointment not available    Caller's first and last name and relationship to patient (if not the patient):self      Best contact [phone]      Preferred date and time:any      Scheduled appointment date and time:any      Reason for appointment:reschedule appointment that was missed today       Details to clarify the request:had a spike of [address] [facility]

## 2021-11-19 ENCOUNTER — TELEPHONE (OUTPATIENT)
Dept: DIABETES SERVICES | Age: 51
End: 2021-11-19

## 2021-11-19 NOTE — TELEPHONE ENCOUNTER
She accepted over [employer] so you don't need to call her. Please put her down for 12/9/21 at 10:30am for DM.

## 2021-11-19 NOTE — TELEPHONE ENCOUNTER
Pt called because she downloaded her pump finally and I checked to be sure all the settings were correct from when she had to get a new one after she fell. She entered all settings correctly. She also is being frequently kicked out due to max delivery and reports show she is only eating 47 gms carb+/- 30 gms daily so not sure why she is running so high unless she is not putting in all carbs or not accurate counting. She denies this is an issue. She would benefit from a carb ratio change to help improve post meal BS readings so all ratios decreased by one. She overslept and missed her appt with Dr. Aubrey Michelle [address] Tuesday so rescheduled for 12/9/21.      MN 1:14  11 am 1:13  5pm 1:14   Changes made per insulin pump orders    Tierra Stanford RD, [facility]

## 2021-11-25 LAB
ALBUMIN SERPL-MCNC: 4.5 G/DL (ref 3.8–4.8)
ALBUMIN/CREAT UR: 16 MG/G CREAT (ref 0–29)
ALBUMIN/GLOB SERPL: 1.7 {RATIO} (ref 1.2–2.2)
ALP SERPL-CCNC: 118 IU/L (ref 44–121)
ALT SERPL-CCNC: 10 IU/L (ref 0–32)
AST SERPL-CCNC: 12 IU/L (ref 0–40)
BILIRUB SERPL-MCNC: 0.3 MG/DL (ref 0–1.2)
BUN SERPL-MCNC: 14 MG/DL (ref 6–24)
BUN/CREAT SERPL: 9 (ref 9–23)
CALCIUM SERPL-MCNC: 9.4 MG/DL (ref 8.7–10.2)
CHLORIDE SERPL-SCNC: 92 MMOL/L (ref 96–106)
CHOLEST SERPL-MCNC: 157 MG/DL (ref 100–199)
CO2 SERPL-SCNC: 27 MMOL/L (ref 20–29)
CREAT SERPL-MCNC: 1.58 MG/DL (ref 0.57–1)
CREAT UR-MCNC: 82.6 MG/DL
EST. AVERAGE GLUCOSE BLD GHB EST-MCNC: 226 MG/DL
GLOBULIN SER CALC-MCNC: 2.7 G/DL (ref 1.5–4.5)
GLUCOSE SERPL-MCNC: 470 MG/DL (ref 65–99)
HBA1C MFR BLD: 9.5 % (ref 4.8–5.6)
HDLC SERPL-MCNC: 42 MG/DL
IMP & REVIEW OF LAB RESULTS: NORMAL
INTERPRETATION: NORMAL
LDLC SERPL CALC-MCNC: 86 MG/DL (ref 0–99)
MICROALBUMIN UR-MCNC: 13.2 UG/ML
POTASSIUM SERPL-SCNC: 4.8 MMOL/L (ref 3.5–5.2)
PROT SERPL-MCNC: 7.2 G/DL (ref 6–8.5)
SODIUM SERPL-SCNC: 134 MMOL/L (ref 134–144)
TRIGL SERPL-MCNC: 171 MG/DL (ref 0–149)
TSH SERPL DL<=0.005 MIU/L-ACNC: 2.88 UIU/ML (ref 0.45–4.5)
VLDLC SERPL CALC-MCNC: 29 MG/DL (ref 5–40)

## 2021-12-03 ENCOUNTER — TELEPHONE (OUTPATIENT)
Dept: DIABETES SERVICES | Age: 51
End: 2021-12-03

## 2021-12-03 NOTE — TELEPHONE ENCOUNTER
Pt called to report her BS were elevated in the 300's and was not coming down. Discussion reveals she has been having issues with the infusion set coming off in the shower and when she goes to put on a new infusion set she is putting more insulin into the cartridge as well. She is not replacing the cartridge but topping it off so she has a mix of insulin from different days in there. Samaria Alfred she was not taught this protocol. Reviewed set change includes a full change of insulin cartridge as well as infusion set. She should only be filling cartridge with what is needed for 3 days so she does not waste insulin and needs to find a product to help hold infusion sets on. She reports she just bought some mastisol product. She called a second time to report she downloaded into [employer] and will see Dr. Janet Batista next Thursday and will download for him next Wednesday. Review of reports show she is being kicked out of auto mode frequently due to sensor updating x7 and high BS auto mode exit x4. Pt reports she will no longer add insulin to her cartridge and since she changed the insulin completely when she got home she reports BS steadily coming down.      Misty Rahman RD, [facility]

## 2021-12-09 ENCOUNTER — OFFICE VISIT (OUTPATIENT)
Dept: ENDOCRINOLOGY | Age: 51
End: 2021-12-09
Payer: MEDICARE

## 2021-12-09 VITALS
HEIGHT: 65 IN | WEIGHT: 223.8 LBS | SYSTOLIC BLOOD PRESSURE: 143 MMHG | HEART RATE: 72 BPM | DIASTOLIC BLOOD PRESSURE: 68 MMHG | BODY MASS INDEX: 37.29 KG/M2

## 2021-12-09 DIAGNOSIS — R79.89 ELEVATED LFTS: ICD-10-CM

## 2021-12-09 DIAGNOSIS — I10 ESSENTIAL HYPERTENSION: ICD-10-CM

## 2021-12-09 DIAGNOSIS — E78.5 HYPERLIPIDEMIA LDL GOAL <100: ICD-10-CM

## 2021-12-09 DIAGNOSIS — R94.6 BORDERLINE ABNORMAL THYROID FUNCTION TEST: ICD-10-CM

## 2021-12-09 PROCEDURE — 3046F HEMOGLOBIN A1C LEVEL >9.0%: CPT | Performed by: INTERNAL MEDICINE

## 2021-12-09 PROCEDURE — 2022F DILAT RTA XM EVC RTNOPTHY: CPT | Performed by: INTERNAL MEDICINE

## 2021-12-09 PROCEDURE — 3017F COLORECTAL CA SCREEN DOC REV: CPT | Performed by: INTERNAL MEDICINE

## 2021-12-09 PROCEDURE — G8427 DOCREV CUR MEDS BY ELIG CLIN: HCPCS | Performed by: INTERNAL MEDICINE

## 2021-12-09 PROCEDURE — G9717 DOC PT DX DEP/BP F/U NT REQ: HCPCS | Performed by: INTERNAL MEDICINE

## 2021-12-09 PROCEDURE — G8754 DIAS BP LESS 90: HCPCS | Performed by: INTERNAL MEDICINE

## 2021-12-09 PROCEDURE — G8417 CALC BMI ABV UP PARAM F/U: HCPCS | Performed by: INTERNAL MEDICINE

## 2021-12-09 PROCEDURE — G8753 SYS BP > OR = 140: HCPCS | Performed by: INTERNAL MEDICINE

## 2021-12-09 PROCEDURE — 99214 OFFICE O/P EST MOD 30 MIN: CPT | Performed by: INTERNAL MEDICINE

## 2021-12-09 NOTE — PROGRESS NOTES
Chief Complaint   Patient presents with    Diabetes     pcp and pharmacy confirmed     History of Present Illness: Sonam Jeffrey is a 49 y.o. female here for follow up of diabetes. Weight up 9 lbs since last visit in 9/21. Current settings are as follows:  - basal: 12a: 1.4  - Carb ratio: 12a: 14, 11a: 13. 5p: 14  - sensitivity: 28  - target: 12a: 130-150  - active insulin time: 3 hours    She has been in touch with Georges Ang from the program for diabetes health to help fine tune her settings and have settled on the ones above. She was unable to provide the correct [employer] username and password so I couldn't review her data directly but did go under sensor review on her pump and saw that she is able to keep her sugars in the 100-180 range while in automode but does have some spikes after meals over 180 despite accurate carb counting so we'll make her carb ratios slightly more aggressive. She has had several issues with sites and wasn't always putting new insulin into her cartridge when she did a site change which was leading to a mix of old and new insulin in the cartridge but since speaking with Georges Ang on 12/3/21, she is no longer doing this. Current Outpatient Medications   Medication Sig    gabapentin (NEURONTIN) 300 mg capsule Take 2 Capsules by mouth three (3) times daily. Max Daily Amount: 1,800 mg.    NovoLOG U-100 Insulin aspart 100 unit/mL injection USE AS DIRECTED FOR INSULIN PUMP.  UNITS PER DAY. DX E11.65    carvediloL (COREG) 6.25 mg tablet TAKE ONE TABLET BY MOUTH DAILY    Accu-Chek Fastclix Lancet Drum misc Use to test up to 8 times per day. DX Code: 10.65--delete prescription for softclix sent in error    Accu-Chek Guide test strips strip Use as directed to test up to 8 times per day.  Dx E10.65    Insulin Syringe-Needle U-100 (BD Insulin Syringe Ultra-Fine) 0.5 mL 31 gauge x 5/16\" syrg USE ONE SYRINGE - FOUR TIMES A DAY    Accu-Chek Leti Plus test strp strip Use as directed to test up to 8 times per day. Dx E10.65 (Patient not taking: Reported on 2021)    elagolix Iven Rubins) 150 mg tab Take 150 mg by mouth daily.  aspirin delayed-release 81 mg tablet Take 81 mg by mouth daily.  diclofenac (Voltaren) 1 % gel Apply 2 g to affected area four (4) times daily as needed for Pain. RT KNEE    oxyCODONE IR (ROXICODONE) 5 mg immediate release tablet Take 5 mg by mouth every four (4) hours as needed for Pain.  rosuvastatin (Crestor) 40 mg tablet Take 40 mg by mouth daily.  glucagon (Glucagon Emergency Kit, human,) 1 mg injection USE AS DIRECTED    ARIPiprazole (Abilify) 30 mg tablet Take 30 mg by mouth daily.  brexpiprazole (Rexulti) 0.5 mg tab tablet Take 1 mg by mouth two (2) times a day.  busPIRone (BUSPAR) 10 mg tablet Take 20 mg by mouth two (2) times a day.  fentaNYL (DURAGESIC) 25 mcg/hr PATCH 1 Patch by TransDERmal route every seventy-two (72) hours.  ALPRAZolam (XANAX) 1 mg tablet Take 1 mg by mouth three (3) times daily as needed.  promethazine (PHENERGAN) 25 mg tablet TAKE ONE TABLET BY MOUTH EVERY 6 HOURS AS NEEDED FOR NAUSEA    fluticasone propionate (FLONASE) 50 mcg/actuation nasal spray 2 Sprays by Both Nostrils route daily as needed for Rhinitis.  bumetanide (BUMEX) 2 mg tablet Take 2 mg by mouth two (2) times a day.  atorvastatin (LIPITOR) 40 mg tablet TAKE ONE TABLET BY MOUTH DAILY    esomeprazole (NEXIUM) 40 mg capsule TAKE ONE CAPSULE BY MOUTH DAILY    insulin pump (PATIENT SUPPLIED) misc by SubCUTAneous route as needed. Insulin Type: NOVOLOG  Basal Dose:  5287-4181: 1.55 units/hr  1779-7713: 1.65 units/hr    Target -2821: 100-130 mg/dL: 1991-6051: 130-150 mg/dL  Corrective Dose:  unit per  mg/dL over  mg/dL  Insulin: Carb ratio: 1 units: 12 grams  Last time insulin pump supplies changed: 21  Last time insulin pump refilled: 21    citalopram (CELEXA) 20 mg tablet Take 40 mg by mouth daily.     traZODone (DESYREL) 50 mg tablet Take 100 mg by mouth nightly.  levonorgestrel (MIRENA) 20 mcg/24 hr (5 years) IUD 1 Device by IntraUTERine route once.  fentaNYL (DURAGESIC) 100 mcg/hr PATCH 1 Patch by TransDERmal route every seventy-two (72) hours. No current facility-administered medications for this visit. Allergies   Allergen Reactions    Metolazone Other (comments)     Caused hyponatremia    Zocor [Simvastatin] Myalgia    Lisinopril Cough    Medrol [Methylprednisolone] Other (comments)     Caused severe hyperglycemia with the medrol pack     Review of Systems: PER HPI    Physical Examination:  Blood pressure (!) 143/68, pulse 72, height 5' 5\" (1.651 m), weight 223 lb 12.8 oz (101.5 kg). - General: pleasant, no distress, good eye contact   - Neck: no carotid bruits  - Cardiovascular: regular, normal rate, nl s1 and s2, no m/r/g,   - Respiratory: clear bilaterally  - Integumentary: 1+ edema,   - Psychiatric: normal mood and affect    Data Reviewed:   Component      Latest Ref Rng & Units 11/24/2021   Glucose      65 - 99 mg/dL 470 (H)   BUN      6 - 24 mg/dL 14   Creatinine      0.57 - 1.00 mg/dL 1.58 (H)   GFR est non-AA      >59 mL/min/1.73 38 (L)   GFR est AA      >59 mL/min/1.73 44 (L)   BUN/Creatinine ratio      9 - 23 9   Sodium      134 - 144 mmol/L 134   Potassium      3.5 - 5.2 mmol/L 4.8   Chloride      96 - 106 mmol/L 92 (L)   CO2      20 - 29 mmol/L 27   Calcium      8.7 - 10.2 mg/dL 9.4   Protein, total      6.0 - 8.5 g/dL 7.2   Albumin      3.8 - 4.8 g/dL 4.5   GLOBULIN, TOTAL      1.5 - 4.5 g/dL 2.7   A-G Ratio      1.2 - 2.2 1.7   Bilirubin, total      0.0 - 1.2 mg/dL 0.3   Alk. phosphatase      44 - 121 IU/L 118   AST      0 - 40 IU/L 12   ALT      0 - 32 IU/L 10   Cholesterol, total      100 - 199 mg/dL 157   Triglyceride      0 - 149 mg/dL 171 (H)   HDL Cholesterol      >39 mg/dL 42   VLDL, calculated      5 - 40 mg/dL 29   LDL, calculated      0 - 99 mg/dL 86   Creatinine, urine      Not Estab. mg/dL 82.6   Microalbumin, urine      Not Estab. ug/mL 13.2   Microalbumin/Creat. 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): Most recent Hgb A1c was 9.5% in 11/21 up from 9.1% in 7/21 down from 9.6% in 4/21 stable from 1/21 up from 9.4% in 7/20 down from 9.5% in 12/19 down from 10.6% in 9/19 up from 9.3% in 7/19 down from 10.1% in 5/19 up from 10% in 1/19 up from 9.2% in 10/18 up from 9% in 7/18 up from 8.2% in 4/18 down from 8.8% in 11/17 up from 7.4% in 9/17 down from 9.4% in 2/17 up from 9.1% in 11/16 down from 10% in 3/16 up from 9.4% in 12/15 up from 9.2% in 9/15 down from 9.7% in 5/15 down from 10.3% in 1/15 up from 8.9% in 10/14 down from 11.1% in 4/14 up from 9.2% in 1/14 up from 8.6% in March 2013 down from 8.9% in November down from 9.7% in Aug 2012 up from 9.2% in October 2011 up from 8.7% in May down from 9.7% in March up from 9.1% in July 2010 down from 10.2% in January down from 13.7% in October 2009. Made her carb ratios aggressive to help with post meal spikes  - cont current pump settings aside from change below  - check bs 8 times daily due to fluctuating blood sugars  - foot exam done 9/19  - microalbumin was 69 in 7/10 up to 392 in March 2011 and 621 in May down to 216 in October 2011 and 37.3 in 4/14 and up to 183 in 12/15 and 300 in 9/16, down to 85 in 4/18 and 10.2 in 9/19 and normal ever since, last in 11/21  - optho UTD 3/18--due now  - check A1c and bmp prior to next visit      2) HTN NOS (401.9): Blood pressure was just above goal < 140/90 but didn't repeat manually  - cont current regimen      3) Hyperlipidemia (272.4): Given DM, goal LDL is < 100, non-HDL < 130, and TGs < 150.   LDL was 146 in January 2010, down to 124 in July and 93 in March 2011 with taking 5 mg of crestor daily.  Her crestor was changed to pravastatin by Dr. Allen Coffey because of cost in Feb 2012. LDL 93 in 8/12 but her HDL was high at 138 due to ETOH intake. LDL down to 39 in 11/12 but up to 102 in 3/13. Off pravastatin at this time and previously was on 10 mg daily.  in 1/14. Up to 167 in 4/14 so started lovastatin 20 mg daily but she couldn't afford this. She has been started on crestor 40 mg daily by crossover clinic and LDL 64 in 10/14 and 55 in 1/15. Was 37 in 3/16 so dose decreased to 20 mg daily and LDL 23 in 2/17. Was changed to lipitor 1/2 of 80 mg daily when clinic couldn't get crestor any longer and LDL 37 in 9/17. This was stopped during her hospital stay in 11/17 but was restarted by Dr. Nu Dior in 12/17 and 42 in 4/18. Up to 94 in 8/18. Down to 79 in 1/19 and 88 in 9/19 and 78 in 12/19 and 61 in 7/20 and 54 in 1/21 and 86 in 11/21  - cont lipitor 40 mg daily  - check lipids prior to next visit        4) Elevated LFTs (790.6): I told her previously that her LFTs were elevated likely due to ETOH intake so I advised her to cut back on this and her repeat LFTs were normal in 3/13 and 1/14 and 4/14 and 10/14 and 1/15 and 12/15 and 3/16. AST up to 76 in 2/17 but back to normal in 5/17 and has been normal ever since so will follow this. 5) Borderline abnormal TFT: TSH 2.1 in 12/11 but up to 4.01 in 11/12 and 4.75 in 11/17 during 2 hospital stays. Up to 5.45 in 1/19 with Dr. Nu Dior. Repeat TSH 1.24 and FT4 0.95 and TPO ab 21 in 2/19 so held on any treatment. TSH up to 3.36 in 9/19 but down to 2.61 in 12/19 and 1.59 in 7/19 and 1.48 in 7/21 and 2.88 in 11/21  - check TSH in 11/22    Patient Instructions   1) Current settings are as follows:  - basal: 12a: 1.4  - Carb ratio: 12a: 13, 11a: 12. 5p: 13  - sensitivity: 28  - target: 12a: 130-150  - active insulin time: 3 hours    I made your carb ratio slightly more aggressive by decreasing the number by 1 point.   See if this helps with spikes after meals to stay under 180 after eating. If you are still spiking over 180 despite counting the carbs correctly let me know and I can help you adjust further. Let me know your username and password for [facility] so I can access this in the future. 2) Your TSH is a thyroid test.  Your level is normal so you don't have any problem with your thyroid function at this time that needs further evaluation or treatment. 3) Your creatinine (kidney test) was slightly high due to mild dehydration from having a sugar of 470 the day of the lab draw. Drink at least 4 8oz glasses of water everyday to stay hydrated to prevent your creatinine level from going higher. 4) ALT and AST are markers of liver function. Your values are normal.    5) Your triglycerides (short term fats) were high. This value can be quite elevated when your blood sugars are uncontrolled so hopefully with better diabetes control, this value will return to normal.    6) I expect your next A1c to be down as when you have not had issues with your sites, your sugars are looking much better.             Follow-up and Dispositions    · Return 3/10/22 at 10:30am.               Copy sent to:  Dr. Latia Nelson

## 2021-12-09 NOTE — PATIENT INSTRUCTIONS
1) Current settings are as follows:  - basal: 12a: 1.4  - Carb ratio: 12a: 13, 11a: 12. 5p: 13  - sensitivity: 28  - target: 12a: 130-150  - active insulin time: 3 hours    I made your carb ratio slightly more aggressive by decreasing the number by 1 point. See if this helps with spikes after meals to stay under 180 after eating. If you are still spiking over 180 despite counting the carbs correctly let me know and I can help you adjust further. Let me know your username and password for [employer] so I can access this in the future. 2) Your TSH is a thyroid test.  Your level is normal so you don't have any problem with your thyroid function at this time that needs further evaluation or treatment. 3) Your creatinine (kidney test) was slightly high due to mild dehydration from having a sugar of 470 the day of the lab draw. Drink at least 4 8oz glasses of water everyday to stay hydrated to prevent your creatinine level from going higher. 4) ALT and AST are markers of liver function. Your values are normal.    5) Your triglycerides (short term fats) were high. This value can be quite elevated when your blood sugars are uncontrolled so hopefully with better diabetes control, this value will return to normal.    6) I expect your next A1c to be down as when you have not had issues with your sites, your sugars are looking much better.

## 2022-02-01 ENCOUNTER — TELEPHONE (OUTPATIENT)
Dept: ENDOCRINOLOGY | Age: 52
End: 2022-02-01

## 2022-02-02 NOTE — TELEPHONE ENCOUNTER
----- Message from Malcom Spence RD sent at 12/10/2021  8:04 AM EST -----  Regarding: [facility]  You have the correct website but you don't need their user name and password. She is already linked to the account for your office. User name: Govind  PW: diabetes 1    Should bring you into the practice account and then scroll to find her name. I am free till [phone] today if you need any help    Rodolfo Marc   ----- Message -----  From: Rosita Tyler MD  Sent: 12/9/2021   6:36 PM EST  To: Malcom Spence RD    I saw her today and I can't get into carelink to view her data. I thought if you used this website:    [email or web address]    And then put in the patient's username and password that you could access it. I'm trying to do this through [employer]. Am I doing something wrong? Let me know when you can.     Thanks,  [employer]

## 2022-02-22 DIAGNOSIS — I10 ESSENTIAL HYPERTENSION: ICD-10-CM

## 2022-02-22 DIAGNOSIS — R94.6 BORDERLINE ABNORMAL THYROID FUNCTION TEST: ICD-10-CM

## 2022-02-22 DIAGNOSIS — E78.5 HYPERLIPIDEMIA LDL GOAL <100: ICD-10-CM

## 2022-02-22 DIAGNOSIS — R79.89 ELEVATED LFTS: ICD-10-CM

## 2022-03-10 ENCOUNTER — OFFICE VISIT (OUTPATIENT)
Dept: ENDOCRINOLOGY | Age: 52
End: 2022-03-10
Payer: MEDICARE

## 2022-03-10 VITALS
BODY MASS INDEX: 36.65 KG/M2 | HEIGHT: 65 IN | DIASTOLIC BLOOD PRESSURE: 73 MMHG | HEART RATE: 78 BPM | SYSTOLIC BLOOD PRESSURE: 130 MMHG | WEIGHT: 220 LBS

## 2022-03-10 DIAGNOSIS — R94.6 BORDERLINE ABNORMAL THYROID FUNCTION TEST: ICD-10-CM

## 2022-03-10 DIAGNOSIS — R79.89 ELEVATED LFTS: ICD-10-CM

## 2022-03-10 DIAGNOSIS — E78.5 HYPERLIPIDEMIA LDL GOAL <100: ICD-10-CM

## 2022-03-10 DIAGNOSIS — I10 ESSENTIAL HYPERTENSION: ICD-10-CM

## 2022-03-10 PROCEDURE — 95251 CONT GLUC MNTR ANALYSIS I&R: CPT | Performed by: INTERNAL MEDICINE

## 2022-03-10 PROCEDURE — 3046F HEMOGLOBIN A1C LEVEL >9.0%: CPT | Performed by: INTERNAL MEDICINE

## 2022-03-10 PROCEDURE — G8427 DOCREV CUR MEDS BY ELIG CLIN: HCPCS | Performed by: INTERNAL MEDICINE

## 2022-03-10 PROCEDURE — G8417 CALC BMI ABV UP PARAM F/U: HCPCS | Performed by: INTERNAL MEDICINE

## 2022-03-10 PROCEDURE — G9717 DOC PT DX DEP/BP F/U NT REQ: HCPCS | Performed by: INTERNAL MEDICINE

## 2022-03-10 PROCEDURE — 3017F COLORECTAL CA SCREEN DOC REV: CPT | Performed by: INTERNAL MEDICINE

## 2022-03-10 PROCEDURE — 99214 OFFICE O/P EST MOD 30 MIN: CPT | Performed by: INTERNAL MEDICINE

## 2022-03-10 PROCEDURE — G8752 SYS BP LESS 140: HCPCS | Performed by: INTERNAL MEDICINE

## 2022-03-10 PROCEDURE — G8754 DIAS BP LESS 90: HCPCS | Performed by: INTERNAL MEDICINE

## 2022-03-10 PROCEDURE — 2022F DILAT RTA XM EVC RTNOPTHY: CPT | Performed by: INTERNAL MEDICINE

## 2022-03-10 RX ORDER — MORPHINE SULFATE 100 MG/1
1 TABLET, FILM COATED, EXTENDED RELEASE ORAL 2 TIMES DAILY
COMMUNITY
Start: 2022-03-07

## 2022-03-10 RX ORDER — MIRTAZAPINE 7.5 MG/1
1 TABLET, FILM COATED ORAL DAILY
COMMUNITY
Start: 2022-03-04

## 2022-03-10 RX ORDER — GABAPENTIN 300 MG/1
600 CAPSULE ORAL 4 TIMES DAILY
Qty: 540 CAPSULE | Refills: 1
Start: 2022-03-10 | End: 2022-06-16 | Stop reason: DRUGHIGH

## 2022-03-10 NOTE — PROGRESS NOTES
Chief Complaint   Patient presents with    Diabetes     PCP and Pharmacy verified    Other     Pt stated that she had her labs done last Tuesday at [facility]. Results are not in the pt's chart     History of Present Illness: Carly Lee is a 47 y.o. female here for follow up of diabetes. Weight down 3 lbs since last visit in 12/21. Current settings are as follows:  - basal: 12a: 1.4  - Carb ratio: 12a: 13, 11a: 12. 5p: 13  - sensitivity: 28  - target: 12a: 130-150  - active insulin time: 3 hours    Review of her sensor on her pump shows that she stays very steady in the 100-150 range overnight and between meals but can still spike up easily over 200 after eating despite counting carbs correctly, especially between 12-4pm.  She does state that she will often bolus 10 min after eating as she doesn't always have a good appetite and doesn't know what she'll be eating. She was changed from lipitor to crestor sometime in the spring of 2021. Compliant with BP regimen. Dr. Linda Oliva wants to do a hysterectomy on her but needs clearance from her cardiologist and pulmonologist first and I would like her A1c closer to 8% or less. Current Outpatient Medications   Medication Sig    mirtazapine (REMERON) 7.5 mg tablet Take 1 Tablet by mouth daily.  morphine CR (MS CONTIN) 100 mg CR tablet Take 1 Tablet by mouth two (2) times a day.  gabapentin (NEURONTIN) 300 mg capsule Take 2 Capsules by mouth three (3) times daily. Max Daily Amount: 1,800 mg.    NovoLOG U-100 Insulin aspart 100 unit/mL injection USE AS DIRECTED FOR INSULIN PUMP.  UNITS PER DAY. DX E11.65    carvediloL (COREG) 6.25 mg tablet TAKE ONE TABLET BY MOUTH DAILY    Accu-Chek Fastclix Lancet Drum misc Use to test up to 8 times per day. DX Code: 10.65--delete prescription for softclix sent in error    Accu-Chek Guide test strips strip Use as directed to test up to 8 times per day.  Dx E10.65    elagolix (Orilissa) 150 mg tab Take 150 mg by mouth daily.  aspirin delayed-release 81 mg tablet Take 81 mg by mouth daily.  diclofenac (Voltaren) 1 % gel Apply 2 g to affected area four (4) times daily as needed for Pain. RT KNEE    oxyCODONE IR (ROXICODONE) 5 mg immediate release tablet Take 5 mg by mouth every four (4) hours as needed for Pain.  rosuvastatin (Crestor) 40 mg tablet Take 40 mg by mouth daily.  glucagon (Glucagon Emergency Kit, human,) 1 mg injection USE AS DIRECTED    ARIPiprazole (Abilify) 30 mg tablet Take 30 mg by mouth daily.  brexpiprazole (Rexulti) 0.5 mg tab tablet Take 1 mg by mouth two (2) times a day.  busPIRone (BUSPAR) 10 mg tablet Take 20 mg by mouth two (2) times a day.  ALPRAZolam (XANAX) 1 mg tablet Take 1 mg by mouth three (3) times daily as needed.  promethazine (PHENERGAN) 25 mg tablet TAKE ONE TABLET BY MOUTH EVERY 6 HOURS AS NEEDED FOR NAUSEA    fluticasone propionate (FLONASE) 50 mcg/actuation nasal spray 2 Sprays by Both Nostrils route daily as needed for Rhinitis.  bumetanide (BUMEX) 2 mg tablet Take 2 mg by mouth two (2) times a day.  esomeprazole (NEXIUM) 40 mg capsule TAKE ONE CAPSULE BY MOUTH DAILY    insulin pump (PATIENT SUPPLIED) misc by SubCUTAneous route as needed. Insulin Type: NOVOLOG  Basal Dose:  5304-9760: 1.55 units/hr  9183-5655: 1.65 units/hr    Target -4876: 100-130 mg/dL: 1471-7879: 130-150 mg/dL  Corrective Dose:  unit per  mg/dL over  mg/dL  Insulin: Carb ratio: 1 units: 12 grams  Last time insulin pump supplies changed: 21  Last time insulin pump refilled: 21    citalopram (CELEXA) 20 mg tablet Take 40 mg by mouth daily.  levonorgestrel (MIRENA) 20 mcg/24 hr (5 years) IUD 1 Device by IntraUTERine route once. No current facility-administered medications for this visit.      Allergies   Allergen Reactions    Metolazone Other (comments)     Caused hyponatremia    Zocor [Simvastatin] Myalgia    Lisinopril Cough    Medrol [Methylprednisolone] Other (comments)     Caused severe hyperglycemia with the medrol pack     Review of Systems: PER HPI    Physical Examination:  Blood pressure 130/73, pulse 78, height 5' 5\" (1.651 m), weight 220 lb (99.8 kg). - General: pleasant, no distress, good eye contact   - Neck: no carotid bruits  - Cardiovascular: regular, normal rate, nl s1 and s2, no m/r/g,   - Respiratory: clear bilaterally  - Integumentary: trace edema,   - Psychiatric: normal mood and affect    Data Reviewed:   - none new for review    Assessment/Plan:     1) 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): Most recent Hgb A1c was 9.5% in 11/21 up from 9.1% in 7/21 down from 9.6% in 4/21 stable from 1/21 up from 9.4% in 7/20 down from 9.5% in 12/19 down from 10.6% in 9/19 up from 9.3% in 7/19 down from 10.1% in 5/19 up from 10% in 1/19 up from 9.2% in 10/18 up from 9% in 7/18 up from 8.2% in 4/18 down from 8.8% in 11/17 up from 7.4% in 9/17 down from 9.4% in 2/17 up from 9.1% in 11/16 down from 10% in 3/16 up from 9.4% in 12/15 up from 9.2% in 9/15 down from 9.7% in 5/15 down from 10.3% in 1/15 up from 8.9% in 10/14 down from 11.1% in 4/14 up from 9.2% in 1/14 up from 8.6% in March 2013 down from 8.9% in November down from 9.7% in Aug 2012 up from 9.2% in October 2011 up from 8.7% in May down from 9.7% in March up from 9.1% in July 2010 down from 10.2% in January down from 13.7% in October 2009.   Made her carb ratios aggressive to help with post meal spikes  - cont current pump settings aside from change below  - check bs 8 times daily due to fluctuating blood sugars  - foot exam done 9/19  - microalbumin was 69 in 7/10 up to 392 in March 2011 and 621 in May down to 216 in October 2011 and 37.3 in 4/14 and up to 183 in 12/15 and 300 in 9/16, down to 85 in 4/18 and 10.2 in 9/19 and normal ever since, last in 11/21  - optho UTD 3/18--due now  - check A1c and cmp today      2) HTN NOS (401.9): Blood pressure was at goal < 140/90   - cont current regimen      3) Hyperlipidemia (272.4): Given DM, goal LDL is < 100, non-HDL < 130, and TGs < 150. LDL was 146 in January 2010, down to 124 in July and 93 in March 2011 with taking 5 mg of crestor daily. Her crestor was changed to pravastatin by Dr. Ramón Cheng because of cost in Feb 2012. LDL 93 in 8/12 but her HDL was high at 138 due to ETOH intake. LDL down to 39 in 11/12 but up to 102 in 3/13. Off pravastatin at this time and previously was on 10 mg daily.  in 1/14. Up to 167 in 4/14 so started lovastatin 20 mg daily but she couldn't afford this. She has been started on crestor 40 mg daily by crossover clinic and LDL 64 in 10/14 and 55 in 1/15. Was 37 in 3/16 so dose decreased to 20 mg daily and LDL 23 in 2/17. Was changed to lipitor 1/2 of 80 mg daily when clinic couldn't get crestor any longer and LDL 37 in 9/17. This was stopped during her hospital stay in 11/17 but was restarted by Dr. Julia Worley in 12/17 and 42 in 4/18. Up to 94 in 8/18. Down to 79 in 1/19 and 88 in 9/19 and 78 in 12/19 and 61 in 7/20 and 54 in 1/21. Changed to crestor 40 mg sometime in the spring of 2021 and LDL 86 in 11/21  - cont crestor 40 mg daily  - check lipids today        4) Elevated LFTs (790.6): I told her previously that her LFTs were elevated likely due to ETOH intake so I advised her to cut back on this and her repeat LFTs were normal in 3/13 and 1/14 and 4/14 and 10/14 and 1/15 and 12/15 and 3/16. AST up to 76 in 2/17 but back to normal in 5/17 and has been normal ever since so will follow this. 5) Borderline abnormal TFT: TSH 2.1 in 12/11 but up to 4.01 in 11/12 and 4.75 in 11/17 during 2 hospital stays. Up to 5.45 in 1/19 with Dr. Julia Worley. Repeat TSH 1.24 and FT4 0.95 and TPO ab 21 in 2/19 so held on any treatment.    TSH up to 3.36 in 9/19 but down to 2.61 in 12/19 and 1.59 in 7/19 and 1.48 in 7/21 and 2.88 in 11/21  - check TSH in 11/22    Patient Instructions   1) Use up the 300 mg caps taking 2 caps 4 times daily at breakfast, lunch, dinner and bedtime. Then we will change to 600 mg tabs to take 1 tab 4 times daily. Let me know when you are down to your last 30 capsules and I'll send in the new supply. 2) Current settings are as follows:  - basal: 12a: 1.4  - Carb ratio: 12a: 12, 11a: 10. 5p: 12  - sensitivity: 28  - target: 12a: 130-150  - active insulin time: 3 hours    I made your carb ratios more aggressive again to help with spikes after meals but I think your basal rate is still good because your sugars are staying steady when you are not eating. 3)  I will send you a message through [employer] with your lab results. Next time we'll just check your A1c in the office and you won't need additional labs. Follow-up and Dispositions    · Return 6/16/22 at 10:30am.               Copy sent to:  Dr. Marylen Sergeant Dr. Cullen Hoof Dr. Salvadore Solomon Dr. Zeke Gasmen    Lab follow up: 03/13/22  Component      Latest Ref Rng & Units 3/10/2022 3/10/2022 3/10/2022          12:00 AM 12:00 AM 12:00 AM   Glucose      65 - 99 mg/dL   191 (H)   BUN      6 - 24 mg/dL   21   Creatinine      0.57 - 1.00 mg/dL   1.63 (H)   eGFR      >59 mL/min/1.73   38 (L)   BUN/Creatinine ratio      9 - 23   13   Sodium      134 - 144 mmol/L   141   Potassium      3.5 - 5.2 mmol/L   5.2   Chloride      96 - 106 mmol/L   97   CO2      20 - 29 mmol/L   25   Calcium      8.7 - 10.2 mg/dL   9.0   Protein, total      6.0 - 8.5 g/dL   7.2   Albumin      3.8 - 4.9 g/dL   4.5   GLOBULIN, TOTAL      1.5 - 4.5 g/dL   2.7   A-G Ratio      1.2 - 2.2   1.7   Bilirubin, total      0.0 - 1.2 mg/dL   <0.2   Alk.  phosphatase      44 - 121 IU/L   119   AST      0 - 40 IU/L   16   ALT      0 - 32 IU/L   10   Cholesterol, total      100 - 199 mg/dL  117    Triglyceride      0 - 149 mg/dL  100    HDL Cholesterol      >39 mg/dL  44    VLDL, calculated      5 - 40 mg/dL  19    LDL, calculated      0 - 99 mg/dL  54    Hemoglobin A1c, (calculated)      4.8 - 5.6 % 11.4 (H)     Estimated average glucose      mg/dL 280       Sent her the following message through [email or web address]:    Hemoglobin A1c is a 3 month marker of your diabetes control. Goal is less than 7%. Your value was 11.4% up from 9.5% at last check. I was surprised to see your value this high but clearly all of the spikes you have had after eating are keeping your sugars up. We need your A1c to be closer to 8% or less to have your hysterectomy so hopefully with the changes we made to your carb ratios, this will help with these spikes. Continue to work on your diet and exercise and take all your medications as directed  -------------------------------------------------------------------------------------------------------------------  Total Cholesterol is the total number of cholesterol particles in your blood. Goal is less than 200. Triglycerides are the short term fats in your blood. Goal is less than 150. HDL is the good cholesterol in your blood. Goal is more than 50 if you are a woman and 40 if you are a man. LDL is the bad cholesterol in your blood. Goal is less than 100 unless you have a history of heart disease or stroke and then goal is under 70. Your cholesterol is at goal.    Continue to follow a low cholesterol diet. Try to limit the amount of fried foods, fatty foods, butter, gravy, red meat, ice cream, cheese, and eggs in your diet, which are all high in cholesterol.  -------------------------------------------------------------------------------------------------------------------  BUN and creatinine are markers of kidney function. Your creatinine is abnormal at 1.63 but stable from 1.58 at last check. -------------------------------------------------------------------------------------------------------------------  ALT and AST are markers of liver function.   Your values are normal.

## 2022-03-10 NOTE — PATIENT INSTRUCTIONS
1) Use up the 300 mg caps taking 2 caps 4 times daily at breakfast, lunch, dinner and bedtime. Then we will change to 600 mg tabs to take 1 tab 4 times daily. Let me know when you are down to your last 30 capsules and I'll send in the new supply. 2) Current settings are as follows:  - basal: 12a: 1.4  - Carb ratio: 12a: 12, 11a: 10. 5p: 12  - sensitivity: 28  - target: 12a: 130-150  - active insulin time: 3 hours    I made your carb ratios more aggressive again to help with spikes after meals but I think your basal rate is still good because your sugars are staying steady when you are not eating. 3)  I will send you a message through [employer] with your lab results. Next time we'll just check your A1c in the office and you won't need additional labs.

## 2022-03-11 LAB
ALBUMIN SERPL-MCNC: 4.5 G/DL (ref 3.8–4.9)
ALBUMIN/GLOB SERPL: 1.7 {RATIO} (ref 1.2–2.2)
ALP SERPL-CCNC: 119 IU/L (ref 44–121)
ALT SERPL-CCNC: 10 IU/L (ref 0–32)
AST SERPL-CCNC: 16 IU/L (ref 0–40)
BILIRUB SERPL-MCNC: <0.2 MG/DL (ref 0–1.2)
BUN SERPL-MCNC: 21 MG/DL (ref 6–24)
BUN/CREAT SERPL: 13 (ref 9–23)
CALCIUM SERPL-MCNC: 9 MG/DL (ref 8.7–10.2)
CHLORIDE SERPL-SCNC: 97 MMOL/L (ref 96–106)
CHOLEST SERPL-MCNC: 117 MG/DL (ref 100–199)
CO2 SERPL-SCNC: 25 MMOL/L (ref 20–29)
CREAT SERPL-MCNC: 1.63 MG/DL (ref 0.57–1)
EGFR: 38 ML/MIN/1.73
EST. AVERAGE GLUCOSE BLD GHB EST-MCNC: 280 MG/DL
GLOBULIN SER CALC-MCNC: 2.7 G/DL (ref 1.5–4.5)
GLUCOSE SERPL-MCNC: 191 MG/DL (ref 65–99)
HBA1C MFR BLD: 11.4 % (ref 4.8–5.6)
HDLC SERPL-MCNC: 44 MG/DL
IMP & REVIEW OF LAB RESULTS: NORMAL
LDLC SERPL CALC-MCNC: 54 MG/DL (ref 0–99)
POTASSIUM SERPL-SCNC: 5.2 MMOL/L (ref 3.5–5.2)
PROT SERPL-MCNC: 7.2 G/DL (ref 6–8.5)
SODIUM SERPL-SCNC: 141 MMOL/L (ref 134–144)
TRIGL SERPL-MCNC: 100 MG/DL (ref 0–149)
VLDLC SERPL CALC-MCNC: 19 MG/DL (ref 5–40)

## 2022-03-18 PROBLEM — J44.1 COPD EXACERBATION (HCC): Status: ACTIVE | Noted: 2019-07-10

## 2022-03-18 PROBLEM — J69.0 ASPIRATION PNEUMONIA (HCC): Status: ACTIVE | Noted: 2017-11-26

## 2022-03-18 PROBLEM — N18.30 CKD (CHRONIC KIDNEY DISEASE) STAGE 3, GFR 30-59 ML/MIN (HCC): Status: ACTIVE | Noted: 2019-12-17

## 2022-03-19 PROBLEM — T50.901A DRUG OVERDOSE: Status: ACTIVE | Noted: 2019-08-21

## 2022-03-19 PROBLEM — E10.21 DIABETIC NEPHROPATHY ASSOCIATED WITH TYPE 1 DIABETES MELLITUS (HCC): Status: ACTIVE | Noted: 2018-05-11

## 2022-03-19 PROBLEM — E66.01 SEVERE OBESITY (HCC): Status: ACTIVE | Noted: 2019-01-25

## 2022-03-19 PROBLEM — R41.82 CHANGE IN MENTAL STATUS: Status: ACTIVE | Noted: 2019-08-21

## 2022-03-19 PROBLEM — I10 ESSENTIAL HYPERTENSION: Status: ACTIVE | Noted: 2017-12-15

## 2022-03-19 PROBLEM — J96.00 RESPIRATORY FAILURE, ACUTE (HCC): Status: ACTIVE | Noted: 2021-01-18

## 2022-03-19 PROBLEM — F32.A ANXIETY AND DEPRESSION: Status: ACTIVE | Noted: 2017-11-14

## 2022-03-19 PROBLEM — A41.9 SEPSIS (HCC): Status: ACTIVE | Noted: 2021-01-18

## 2022-03-19 PROBLEM — N17.9 AKI (ACUTE KIDNEY INJURY) (HCC): Status: ACTIVE | Noted: 2019-08-22

## 2022-03-19 PROBLEM — F41.9 ANXIETY AND DEPRESSION: Status: ACTIVE | Noted: 2017-11-14

## 2022-04-02 RX ORDER — PEN NEEDLE, DIABETIC 29 G X1/2"
NEEDLE, DISPOSABLE MISCELLANEOUS
Qty: 100 EACH | Refills: 11 | Status: SHIPPED | OUTPATIENT
Start: 2022-04-02

## 2022-04-07 ENCOUNTER — TELEPHONE (OUTPATIENT)
Dept: ENDOCRINOLOGY | Age: 52
End: 2022-04-07

## 2022-04-07 NOTE — TELEPHONE ENCOUNTER
I submitted a prior authorization for accu-chek guide strips through [employer] and am currently waiting a response from the system.

## 2022-04-07 NOTE — TELEPHONE ENCOUNTER
Sent her the following message through [employer]:    I submitted an authorization for the accu-chek guide test strips through our electronic system called [employer] and received notification that this med has been approved so you should be able to get this from the local or mail pharmacy that normally dispenses this for you. Please let me know if you have any trouble getting this filled.

## 2022-04-13 ENCOUNTER — HOSPITAL ENCOUNTER (EMERGENCY)
Age: 52
Discharge: HOME | End: 2022-04-13
Attending: STUDENT IN AN ORGANIZED HEALTH CARE EDUCATION/TRAINING PROGRAM
Payer: MEDICARE

## 2022-04-13 ENCOUNTER — APPOINTMENT (OUTPATIENT)
Dept: GENERAL RADIOLOGY | Age: 52
End: 2022-04-13
Attending: STUDENT IN AN ORGANIZED HEALTH CARE EDUCATION/TRAINING PROGRAM
Payer: MEDICARE

## 2022-04-13 VITALS
HEIGHT: 65 IN | RESPIRATION RATE: 24 BRPM | WEIGHT: 220 LBS | SYSTOLIC BLOOD PRESSURE: 99 MMHG | HEART RATE: 85 BPM | DIASTOLIC BLOOD PRESSURE: 67 MMHG | BODY MASS INDEX: 36.65 KG/M2 | OXYGEN SATURATION: 97 % | TEMPERATURE: 98.1 F

## 2022-04-13 DIAGNOSIS — J96.12 CHRONIC RESPIRATORY FAILURE WITH HYPOXIA AND HYPERCAPNIA (HCC): ICD-10-CM

## 2022-04-13 DIAGNOSIS — F17.200 SMOKER: ICD-10-CM

## 2022-04-13 DIAGNOSIS — J44.1 ACUTE EXACERBATION OF CHRONIC OBSTRUCTIVE PULMONARY DISEASE (COPD) (HCC): Primary | ICD-10-CM

## 2022-04-13 DIAGNOSIS — J96.11 CHRONIC RESPIRATORY FAILURE WITH HYPOXIA AND HYPERCAPNIA (HCC): ICD-10-CM

## 2022-04-13 DIAGNOSIS — E87.6 HYPOKALEMIA: ICD-10-CM

## 2022-04-13 DIAGNOSIS — N17.9 AKI (ACUTE KIDNEY INJURY) (HCC): ICD-10-CM

## 2022-04-13 DIAGNOSIS — R42 VERTIGO: ICD-10-CM

## 2022-04-13 LAB
ALBUMIN SERPL-MCNC: 3.6 G/DL (ref 3.5–5)
ALBUMIN/GLOB SERPL: 1 {RATIO} (ref 1.1–2.2)
ALP SERPL-CCNC: 108 U/L (ref 45–117)
ALT SERPL-CCNC: 19 U/L (ref 12–78)
ANION GAP SERPL CALC-SCNC: 7 MMOL/L (ref 5–15)
APPEARANCE UR: ABNORMAL
AST SERPL-CCNC: 21 U/L (ref 15–37)
BACTERIA URNS QL MICRO: NEGATIVE /HPF
BASE EXCESS BLDV CALC-SCNC: 8.8 MMOL/L
BASOPHILS # BLD: 0 K/UL (ref 0–0.1)
BASOPHILS NFR BLD: 0 % (ref 0–1)
BILIRUB SERPL-MCNC: 0.3 MG/DL (ref 0.2–1)
BILIRUB UR QL: NEGATIVE
BNP SERPL-MCNC: 142 PG/ML
BUN SERPL-MCNC: 27 MG/DL (ref 6–20)
BUN/CREAT SERPL: 13 (ref 12–20)
CALCIUM SERPL-MCNC: 8.5 MG/DL (ref 8.5–10.1)
CHLORIDE SERPL-SCNC: 95 MMOL/L (ref 97–108)
CO2 SERPL-SCNC: 35 MMOL/L (ref 21–32)
COLOR UR: ABNORMAL
CREAT SERPL-MCNC: 2.13 MG/DL (ref 0.55–1.02)
DIFFERENTIAL METHOD BLD: ABNORMAL
EOSINOPHIL # BLD: 0.2 K/UL (ref 0–0.4)
EOSINOPHIL NFR BLD: 3 % (ref 0–7)
EPITH CASTS URNS QL MICRO: ABNORMAL /LPF
ERYTHROCYTE [DISTWIDTH] IN BLOOD BY AUTOMATED COUNT: 15.5 % (ref 11.5–14.5)
GLOBULIN SER CALC-MCNC: 3.7 G/DL (ref 2–4)
GLUCOSE SERPL-MCNC: 156 MG/DL (ref 65–100)
GLUCOSE UR STRIP.AUTO-MCNC: 250 MG/DL
GRAN CASTS URNS QL MICRO: ABNORMAL /LPF
HCO3 BLDV-SCNC: 36.4 MMOL/L (ref 23–28)
HCT VFR BLD AUTO: 35.8 % (ref 35–47)
HGB BLD-MCNC: 12 G/DL (ref 11.5–16)
HGB UR QL STRIP: ABNORMAL
HYALINE CASTS URNS QL MICRO: ABNORMAL /LPF (ref 0–5)
IMM GRANULOCYTES # BLD AUTO: 0 K/UL (ref 0–0.04)
IMM GRANULOCYTES NFR BLD AUTO: 0 % (ref 0–0.5)
KETONES UR QL STRIP.AUTO: NEGATIVE MG/DL
LEUKOCYTE ESTERASE UR QL STRIP.AUTO: ABNORMAL
LYMPHOCYTES # BLD: 3.2 K/UL (ref 0.8–3.5)
LYMPHOCYTES NFR BLD: 37 % (ref 12–49)
MCH RBC QN AUTO: 29.4 PG (ref 26–34)
MCHC RBC AUTO-ENTMCNC: 33.5 G/DL (ref 30–36.5)
MCV RBC AUTO: 87.7 FL (ref 80–99)
MONOCYTES # BLD: 0.7 K/UL (ref 0–1)
MONOCYTES NFR BLD: 8 % (ref 5–13)
NEUTS SEG # BLD: 4.5 K/UL (ref 1.8–8)
NEUTS SEG NFR BLD: 52 % (ref 32–75)
NITRITE UR QL STRIP.AUTO: NEGATIVE
NRBC # BLD: 0.03 K/UL (ref 0–0.01)
NRBC BLD-RTO: 0.3 PER 100 WBC
PCO2 BLDV: 61.5 MMHG (ref 41–51)
PH BLDV: 7.38 [PH] (ref 7.32–7.42)
PH UR STRIP: 5 [PH] (ref 5–8)
PLATELET # BLD AUTO: 278 K/UL (ref 150–400)
PO2 BLDV: 52 MMHG (ref 25–40)
POTASSIUM SERPL-SCNC: 3.2 MMOL/L (ref 3.5–5.1)
PROT SERPL-MCNC: 7.3 G/DL (ref 6.4–8.2)
PROT UR STRIP-MCNC: 30 MG/DL
RBC # BLD AUTO: 4.08 M/UL (ref 3.8–5.2)
RBC #/AREA URNS HPF: ABNORMAL /HPF (ref 0–5)
SAO2 % BLDV: 84.4 % (ref 65–88)
SERVICE CMNT-IMP: ABNORMAL
SODIUM SERPL-SCNC: 137 MMOL/L (ref 136–145)
SP GR UR REFRACTOMETRY: 1.01 (ref 1–1.03)
SPECIMEN TYPE: ABNORMAL
TROPONIN-HIGH SENSITIVITY: 11 NG/L (ref 0–51)
UA: UC IF INDICATED,UAUC: ABNORMAL
UROBILINOGEN UR QL STRIP.AUTO: 1 EU/DL (ref 0.2–1)
WBC # BLD AUTO: 8.7 K/UL (ref 3.6–11)
WBC URNS QL MICRO: ABNORMAL /HPF (ref 0–4)

## 2022-04-13 PROCEDURE — 81001 URINALYSIS AUTO W/SCOPE: CPT

## 2022-04-13 PROCEDURE — 83880 ASSAY OF NATRIURETIC PEPTIDE: CPT

## 2022-04-13 PROCEDURE — 85025 COMPLETE CBC W/AUTO DIFF WBC: CPT

## 2022-04-13 PROCEDURE — 82803 BLOOD GASES ANY COMBINATION: CPT

## 2022-04-13 PROCEDURE — 74011250636 HC RX REV CODE- 250/636: Performed by: STUDENT IN AN ORGANIZED HEALTH CARE EDUCATION/TRAINING PROGRAM

## 2022-04-13 PROCEDURE — 36415 COLL VENOUS BLD VENIPUNCTURE: CPT

## 2022-04-13 PROCEDURE — 84484 ASSAY OF TROPONIN QUANT: CPT

## 2022-04-13 PROCEDURE — 99285 EMERGENCY DEPT VISIT HI MDM: CPT

## 2022-04-13 PROCEDURE — 94640 AIRWAY INHALATION TREATMENT: CPT

## 2022-04-13 PROCEDURE — 71045 X-RAY EXAM CHEST 1 VIEW: CPT

## 2022-04-13 PROCEDURE — 74011000250 HC RX REV CODE- 250: Performed by: STUDENT IN AN ORGANIZED HEALTH CARE EDUCATION/TRAINING PROGRAM

## 2022-04-13 PROCEDURE — 74011250637 HC RX REV CODE- 250/637: Performed by: STUDENT IN AN ORGANIZED HEALTH CARE EDUCATION/TRAINING PROGRAM

## 2022-04-13 PROCEDURE — 93005 ELECTROCARDIOGRAM TRACING: CPT

## 2022-04-13 PROCEDURE — 80053 COMPREHEN METABOLIC PANEL: CPT

## 2022-04-13 RX ORDER — MECLIZINE HYDROCHLORIDE 25 MG/1
25 TABLET ORAL
Qty: 20 TABLET | Refills: 0 | Status: SHIPPED | OUTPATIENT
Start: 2022-04-13

## 2022-04-13 RX ORDER — IPRATROPIUM BROMIDE AND ALBUTEROL SULFATE 2.5; .5 MG/3ML; MG/3ML
3 SOLUTION RESPIRATORY (INHALATION)
Status: COMPLETED | OUTPATIENT
Start: 2022-04-13 | End: 2022-04-13

## 2022-04-13 RX ORDER — POTASSIUM CHLORIDE 750 MG/1
20 TABLET, FILM COATED, EXTENDED RELEASE ORAL
Status: DISCONTINUED | OUTPATIENT
Start: 2022-04-13 | End: 2022-04-14 | Stop reason: HOSPADM

## 2022-04-13 RX ORDER — OXYCODONE HYDROCHLORIDE 5 MG/1
5 TABLET ORAL
Status: COMPLETED | OUTPATIENT
Start: 2022-04-13 | End: 2022-04-13

## 2022-04-13 RX ORDER — MECLIZINE HCL 12.5 MG 12.5 MG/1
25 TABLET ORAL
Status: COMPLETED | OUTPATIENT
Start: 2022-04-13 | End: 2022-04-13

## 2022-04-13 RX ADMIN — OXYCODONE 5 MG: 5 TABLET ORAL at 17:55

## 2022-04-13 RX ADMIN — IPRATROPIUM BROMIDE AND ALBUTEROL SULFATE 3 ML: .5; 3 SOLUTION RESPIRATORY (INHALATION) at 21:28

## 2022-04-13 RX ADMIN — MECLIZINE 25 MG: 12.5 TABLET ORAL at 17:56

## 2022-04-13 NOTE — ED TRIAGE NOTES
Received pt via ems-pt stated she was coming home from MD appt and did not have 02 on which she normally wears due to what she stated as residual covid symptoms. Diabetic FSBS 383 wears a insulin pump.

## 2022-04-14 LAB
ATRIAL RATE: 78 BPM
CALCULATED P AXIS, ECG09: -18 DEGREES
CALCULATED R AXIS, ECG10: -8 DEGREES
CALCULATED T AXIS, ECG11: 15 DEGREES
DIAGNOSIS, 93000: NORMAL
P-R INTERVAL, ECG05: 154 MS
Q-T INTERVAL, ECG07: 400 MS
QRS DURATION, ECG06: 90 MS
QTC CALCULATION (BEZET), ECG08: 456 MS
VENTRICULAR RATE, ECG03: 78 BPM

## 2022-04-14 NOTE — DISCHARGE INSTRUCTIONS
Please follow up with your pulmonologist and your PCP. Use nebulizer treatments every 4 hours as needed for shortness of breath symptoms. Stop smoking. Please stop using more Bumex than you are supposed to as your kidney function is being affected by volume loss.

## 2022-04-28 ENCOUNTER — TELEPHONE (OUTPATIENT)
Dept: ENDOCRINOLOGY | Age: 52
End: 2022-04-28

## 2022-04-28 RX ORDER — INSULIN LISPRO 100 [IU]/ML
INJECTION, SOLUTION INTRAVENOUS; SUBCUTANEOUS
Qty: 30 ML | Refills: 11 | Status: SHIPPED | OUTPATIENT
Start: 2022-04-28 | End: 2022-10-26 | Stop reason: SDUPTHER

## 2022-05-02 ENCOUNTER — TELEPHONE (OUTPATIENT)
Dept: ENDOCRINOLOGY | Age: 52
End: 2022-05-02

## 2022-05-02 NOTE — TELEPHONE ENCOUNTER
Please call pt to let her know she has an unread message in MyChart:    I received a fax from [facility] that novolog is no longer covered under your plan so I changed to Humalog to use in your pump.

## 2022-05-11 ENCOUNTER — TELEPHONE (OUTPATIENT)
Dept: ENDOCRINOLOGY | Age: 52
End: 2022-05-11

## 2022-05-11 NOTE — TELEPHONE ENCOUNTER
Patient stated she was diagnosed with bronchitis and prescribe a Z-sebastián, Medrol dose pack 4mg  and cough medicine on yesterday by her pcp. She stated she started the Z-sebastián but she would like to know what to do regarding her sugar before she starts the steroids. She stated that it is okay to send a message through [address] [place] [address] [phone].

## 2022-05-11 NOTE — TELEPHONE ENCOUNTER
Called and spoke with pt. She took 2 tabs of z-pack yesterday and 1 dose today and has not yet started the steroids. She is already feeling much better so I recommended she NOT take the medrol pack as last time steroids put her in the hospital due to severe hyperglycemia and dehydration and she is agreeable and will let me know if her symptoms worsen. she voiced understanding of this plan.

## 2022-05-11 NOTE — TELEPHONE ENCOUNTER
Pt called 5/11 @ 3:15pm. Stated she needs the nurse or dr to call her. She was recently diagnosed with bronchitis and was put on steroids. She would like to talk to someone in regards to the medications.     Pt# [phone]

## 2022-05-14 ENCOUNTER — HOSPITAL ENCOUNTER (EMERGENCY)
Age: 52
Discharge: HOME | End: 2022-05-14
Attending: EMERGENCY MEDICINE
Payer: MEDICARE

## 2022-05-14 VITALS
BODY MASS INDEX: 37.56 KG/M2 | HEIGHT: 64 IN | HEART RATE: 87 BPM | SYSTOLIC BLOOD PRESSURE: 144 MMHG | TEMPERATURE: 99 F | WEIGHT: 220 LBS | DIASTOLIC BLOOD PRESSURE: 75 MMHG | OXYGEN SATURATION: 95 % | RESPIRATION RATE: 14 BRPM

## 2022-05-14 DIAGNOSIS — N18.9 CHRONIC KIDNEY DISEASE, UNSPECIFIED CKD STAGE: ICD-10-CM

## 2022-05-14 DIAGNOSIS — R42 LIGHTHEADEDNESS: Primary | ICD-10-CM

## 2022-05-14 DIAGNOSIS — Z79.899 POLYPHARMACY: ICD-10-CM

## 2022-05-14 LAB
ALBUMIN SERPL-MCNC: 3.6 G/DL (ref 3.5–5)
ALBUMIN/GLOB SERPL: 1 {RATIO} (ref 1.1–2.2)
ALP SERPL-CCNC: 115 U/L (ref 45–117)
ALT SERPL-CCNC: 30 U/L (ref 12–78)
AMPHET UR QL SCN: NEGATIVE
ANION GAP SERPL CALC-SCNC: 4 MMOL/L (ref 5–15)
AST SERPL-CCNC: 60 U/L (ref 15–37)
BARBITURATES UR QL SCN: NEGATIVE
BASOPHILS # BLD: 0.1 K/UL (ref 0–0.1)
BASOPHILS NFR BLD: 1 % (ref 0–1)
BENZODIAZ UR QL: POSITIVE
BILIRUB SERPL-MCNC: 0.5 MG/DL (ref 0.2–1)
BUN SERPL-MCNC: 39 MG/DL (ref 6–20)
BUN/CREAT SERPL: 19 (ref 12–20)
CALCIUM SERPL-MCNC: 8.9 MG/DL (ref 8.5–10.1)
CANNABINOIDS UR QL SCN: NEGATIVE
CHLORIDE SERPL-SCNC: 97 MMOL/L (ref 97–108)
CO2 SERPL-SCNC: 34 MMOL/L (ref 21–32)
COCAINE UR QL SCN: NEGATIVE
CREAT SERPL-MCNC: 2.07 MG/DL (ref 0.55–1.02)
DIFFERENTIAL METHOD BLD: ABNORMAL
DRUG SCRN COMMENT,DRGCM: ABNORMAL
EOSINOPHIL # BLD: 0.2 K/UL (ref 0–0.4)
EOSINOPHIL NFR BLD: 2 % (ref 0–7)
ERYTHROCYTE [DISTWIDTH] IN BLOOD BY AUTOMATED COUNT: 15.1 % (ref 11.5–14.5)
GLOBULIN SER CALC-MCNC: 3.7 G/DL (ref 2–4)
GLUCOSE SERPL-MCNC: 125 MG/DL (ref 65–100)
HCT VFR BLD AUTO: 37 % (ref 35–47)
HGB BLD-MCNC: 12.2 G/DL (ref 11.5–16)
IMM GRANULOCYTES # BLD AUTO: 0 K/UL (ref 0–0.04)
IMM GRANULOCYTES NFR BLD AUTO: 0 % (ref 0–0.5)
LYMPHOCYTES # BLD: 2.5 K/UL (ref 0.8–3.5)
LYMPHOCYTES NFR BLD: 25 % (ref 12–49)
MAGNESIUM SERPL-MCNC: 2 MG/DL (ref 1.6–2.4)
MCH RBC QN AUTO: 29 PG (ref 26–34)
MCHC RBC AUTO-ENTMCNC: 33 G/DL (ref 30–36.5)
MCV RBC AUTO: 88.1 FL (ref 80–99)
METHADONE UR QL: NEGATIVE
MONOCYTES # BLD: 0.8 K/UL (ref 0–1)
MONOCYTES NFR BLD: 8 % (ref 5–13)
NEUTS SEG # BLD: 6.5 K/UL (ref 1.8–8)
NEUTS SEG NFR BLD: 64 % (ref 32–75)
NRBC # BLD: 0 K/UL (ref 0–0.01)
NRBC BLD-RTO: 0 PER 100 WBC
OPIATES UR QL: POSITIVE
PCP UR QL: NEGATIVE
PLATELET # BLD AUTO: 261 K/UL (ref 150–400)
PMV BLD AUTO: 10.4 FL (ref 8.9–12.9)
POTASSIUM SERPL-SCNC: 4.1 MMOL/L (ref 3.5–5.1)
PROT SERPL-MCNC: 7.3 G/DL (ref 6.4–8.2)
RBC # BLD AUTO: 4.2 M/UL (ref 3.8–5.2)
SODIUM SERPL-SCNC: 135 MMOL/L (ref 136–145)
WBC # BLD AUTO: 10.1 K/UL (ref 3.6–11)

## 2022-05-14 PROCEDURE — 80307 DRUG TEST PRSMV CHEM ANLYZR: CPT

## 2022-05-14 PROCEDURE — 80053 COMPREHEN METABOLIC PANEL: CPT

## 2022-05-14 PROCEDURE — 83735 ASSAY OF MAGNESIUM: CPT

## 2022-05-14 PROCEDURE — 99284 EMERGENCY DEPT VISIT MOD MDM: CPT

## 2022-05-14 PROCEDURE — 36415 COLL VENOUS BLD VENIPUNCTURE: CPT

## 2022-05-14 PROCEDURE — 85025 COMPLETE CBC W/AUTO DIFF WBC: CPT

## 2022-05-14 RX ORDER — BUPROPION HYDROCHLORIDE 100 MG/1
TABLET, EXTENDED RELEASE ORAL
COMMUNITY
Start: 2022-04-20

## 2022-05-14 RX ORDER — NALOXONE HYDROCHLORIDE 4 MG/.1ML
SPRAY NASAL
Qty: 1 EACH | Refills: 0 | Status: SHIPPED | OUTPATIENT
Start: 2022-05-14

## 2022-05-14 RX ORDER — SPIRONOLACTONE 25 MG/1
50 TABLET ORAL DAILY
COMMUNITY
Start: 2022-05-10

## 2022-05-14 RX ORDER — AZITHROMYCIN 250 MG/1
TABLET, FILM COATED ORAL
COMMUNITY
Start: 2022-05-10 | End: 2022-06-16

## 2022-05-14 RX ORDER — FLUCONAZOLE 150 MG/1
TABLET ORAL
COMMUNITY
Start: 2022-05-02 | End: 2022-10-03 | Stop reason: ALTCHOICE

## 2022-05-14 NOTE — ED PROVIDER NOTES
EMERGENCY DEPARTMENT HISTORY AND PHYSICAL EXAM      Date: 5/14/2022  Patient Name: Shamika Bar  Patient Age and Sex: 47 y.o. female     History of Presenting Illness     Chief Complaint   Patient presents with    Dizziness     x 1 day       History Provided By: Patient, ems    HPI: Shamika Bar is a 56-year-old female presenting for dizziness. According to patient patient has been having dizziness like lightheaded like she is going to pass out for about 1 week. Was prescribed some meclizine out of the ER. Currently on MS Contin, oxycodone, alprazolam, gabapentin for chronic pain issues as well as anxiety. EMS reported that she her voice sounded slurred but no unilateral deficits and patient answering all questions appropriately. She sounded like she was overmedicated. Glucose was 97 for them and 93 currently on her insulin pump which she states is low. States she has been eating well and drinking water but has not eaten lunch yet. Patient states that she does have bilateral leg swelling but no history of congestive heart failure. Is on 3 L nasal cannula at baseline and was saturating well on that. Denies any chest pain, shortness of breath, abdominal pain, diarrhea, dysuria or fevers. There are no other complaints, changes, or physical findings at this time. PCP: Amara Lundy MD    No current facility-administered medications on file prior to encounter. Current Outpatient Medications on File Prior to Encounter   Medication Sig Dispense Refill    azithromycin (ZITHROMAX) 250 mg tablet       buPROPion SR (WELLBUTRIN SR) 100 mg SR tablet       fluconazole (DIFLUCAN) 150 mg tablet       spironolactone (ALDACTONE) 25 mg tablet       HumaLOG U-100 Insulin 100 unit/mL injection USE AS DIRECTED FOR INSULIN PUMP.  UNITS PER DAY. DX E10.65--replaces novolog 30 mL 11    meclizine (ANTIVERT) 25 mg tablet Take 1 Tablet by mouth three (3) times daily as needed for Dizziness.  21 Tablet 0    BD Insulin Syringe Ultra-Fine 0.5 mL 31 gauge x 5/16\" syrg USE ONE SYRINGE - FOUR TIMES A  Each 11    mirtazapine (REMERON) 7.5 mg tablet Take 1 Tablet by mouth daily.  morphine CR (MS CONTIN) 100 mg CR tablet Take 1 Tablet by mouth two (2) times a day.  gabapentin (NEURONTIN) 300 mg capsule Take 2 Capsules by mouth four (4) times daily. Max Daily Amount: 2,400 mg. Dose change 03/10/22--updated med list--did not send prescription to the pharmacy 540 Capsule 1    carvediloL (COREG) 6.25 mg tablet TAKE ONE TABLET BY MOUTH DAILY 90 Tablet 3    Accu-Chek Fastclix Lancet Drum misc Use to test up to 8 times per day. DX Code: 10.65--delete prescription for softclix sent in error 800 Each 3    Accu-Chek Guide test strips strip Use as directed to test up to 8 times per day. Dx E10.65 250 Strip 11    elagolix (Orilissa) 150 mg tab Take 150 mg by mouth daily.  aspirin delayed-release 81 mg tablet Take 81 mg by mouth daily.  diclofenac (Voltaren) 1 % gel Apply 2 g to affected area four (4) times daily as needed for Pain. RT KNEE      oxyCODONE IR (ROXICODONE) 5 mg immediate release tablet Take 5 mg by mouth every four (4) hours as needed for Pain.  rosuvastatin (Crestor) 40 mg tablet Take 40 mg by mouth daily.  glucagon (Glucagon Emergency Kit, human,) 1 mg injection USE AS DIRECTED 2 Vial 11    ARIPiprazole (Abilify) 30 mg tablet Take 30 mg by mouth daily.  brexpiprazole (Rexulti) 0.5 mg tab tablet Take 1 mg by mouth two (2) times a day.  busPIRone (BUSPAR) 10 mg tablet Take 20 mg by mouth two (2) times a day.  ALPRAZolam (XANAX) 1 mg tablet Take 1 mg by mouth three (3) times daily as needed.  fluticasone propionate (FLONASE) 50 mcg/actuation nasal spray 2 Sprays by Both Nostrils route daily as needed for Rhinitis. 16 g 3    bumetanide (BUMEX) 2 mg tablet Take 2 mg by mouth two (2) times a day.       esomeprazole (NEXIUM) 40 mg capsule TAKE ONE CAPSULE BY MOUTH DAILY 30 Cap 11    insulin pump (PATIENT SUPPLIED) misc by SubCUTAneous route as needed. Insulin Type: NOVOLOG  Basal Dose:  2839-8741: 1.55 units/hr  0068-2246: 1.65 units/hr    Target -7249: 100-130 mg/dL: 6095-1870: 130-150 mg/dL  Corrective Dose:  unit per  mg/dL over  mg/dL  Insulin: Carb ratio: 1 units: 12 grams  Last time insulin pump supplies changed: 21  Last time insulin pump refilled: 21      citalopram (CELEXA) 20 mg tablet Take 40 mg by mouth daily.  levonorgestrel (MIRENA) 20 mcg/24 hr (5 years) IUD 1 Device by IntraUTERine route once.          Past History     Past Medical History:  Past Medical History:   Diagnosis Date    Achilles tendon rupture     along with torn right patellar/femoral ligament    Arthritis     rt. foot    Chronic kidney disease     Chronic pain     abdominal pain    Diabetes (HCC)     IDDM on insulin pump and sensor    DM type 1 (diabetes mellitus, type 1) (HCC)     age 25    Endometriosis     s/p ex-lap 4x    Gastric ulcer     GERD (gastroesophageal reflux disease)     Herpes     Other and unspecified hyperlipidemia     Panic attacks     Psychiatric disorder     anxiety, panic attacks    PTSD (post-traumatic stress disorder)     Unspecified essential hypertension        Past Surgical History:  Past Surgical History:   Procedure Laterality Date    HX COLONOSCOPY      HX GYN      2 d&c's    HX GYN      laparoscopies x5 for endometriosis    HX GYN      mirena in place    HX ORTHOPAEDIC      achiles tendon repair,talus repair    HX ORTHOPAEDIC      injections x2 to right shoulder    HX ORTHOPAEDIC Left 2019    3rd trigger finger release    HI CARDIAC SURG PROCEDURE UNLIST         Family History:  Family History   Problem Relation Age of Onset    Diabetes Mother         diet controlled    Diabetes Father         R foot amupation    Liver Disease Father     Heart Disease Paternal Uncle         MI in his 46s    Stroke Neg Hx        Social History:  Social History     Tobacco Use    Smoking status: Current Every Day Smoker     Packs/day: 1.00     Years: 28.00     Pack years: 28.00     Types: Cigarettes    Smokeless tobacco: Current User   Substance Use Topics    Alcohol use: Not Currently     Comment: a beer every few months    Drug use: No     Types: OTC, Prescription       Allergies: Allergies   Allergen Reactions    Metolazone Other (comments)     Caused hyponatremia    Zocor [Simvastatin] Myalgia    Lisinopril Cough    Medrol [Methylprednisolone] Other (comments)     Caused severe hyperglycemia with the medrol pack         Review of Systems   Review of Systems   Constitutional: Negative for chills and fever. Respiratory: Negative for cough and shortness of breath. Cardiovascular: Negative for chest pain. Gastrointestinal: Negative for abdominal pain, constipation, diarrhea, nausea and vomiting. Genitourinary: Negative for dysuria, frequency and hematuria. Neurological: Positive for light-headedness. Negative for weakness and numbness. All other systems reviewed and are negative. Physical Exam   Physical Exam  Vitals and nursing note reviewed. Constitutional:       Appearance: She is well-developed. HENT:      Head: Normocephalic and atraumatic. Nose: Nose normal.      Mouth/Throat:      Mouth: Mucous membranes are moist.   Eyes:      Extraocular Movements: Extraocular movements intact. Conjunctiva/sclera: Conjunctivae normal.   Cardiovascular:      Rate and Rhythm: Normal rate and regular rhythm. Pulmonary:      Effort: Pulmonary effort is normal. No respiratory distress. Breath sounds: Normal breath sounds. Abdominal:      General: There is no distension. Palpations: Abdomen is soft. Tenderness: There is no abdominal tenderness. Musculoskeletal:         General: Normal range of motion. Cervical back: Normal range of motion and neck supple. Right lower leg: Edema present. Left lower leg: Edema present. Comments: Has bilateral pitting edema. Skin:     General: Skin is warm and dry. Neurological:      General: No focal deficit present. Mental Status: She is alert and oriented to person, place, and time. Mental status is at baseline. Comments: Patient's voice is slightly slurred but she states that this is baseline for her. Patient also states that she always has an intention tremor and not worse than baseline. Patient answering all my questions appropriately and following commands. 5 out of 5 strength in all 4 extremities. Psychiatric:         Mood and Affect: Mood normal.          Diagnostic Study Results     Labs -   No results found for this or any previous visit (from the past 12 hour(s)). Radiologic Studies -   No orders to display     CT Results  (Last 48 hours)    None        CXR Results  (Last 48 hours)    None            Medical Decision Making   I am the first provider for this patient. I reviewed the vital signs, available nursing notes, past medical history, past surgical history, family history and social history. Vital Signs-Reviewed the patient's vital signs. No data found. Records Reviewed: Nursing Notes and Old Medical Records    Provider Notes (Medical Decision Making):   Pt presents with lightheadedness. DDx: dehydration, anemia, electrolyte abnormality, infection, orthostatic hypotension, medication side effect. Will get orthostatics, labs and EKG PRN. She could also be lightheaded secondary to all the medications that she is currently on. We will also get UDS. We will feed her to bring her sugar up and see if there is any improvement in her symptoms. ED Course:   Initial assessment performed. The patients presenting problems have been discussed, and they are in agreement with the care plan formulated and outlined with them.   I have encouraged them to ask questions as they arise throughout their visit. ED Course as of 05/15/22 1425   Sat May 14, 2022   1536 It appears that patient gets MS Contin as well as oxycodone from OB/GYN, possibly due to endometriosis. [JS]   1536 PDMP  Filled  Written  Sold  ID  Drug  QTY  Days  Prescriber  RX #  Dispenser  Refill  Daily Dose*  Pymt Type      05/10/2022  05/10/2022   1  Alprazolam 1 Mg Tablet   90.00  30  Hugh Raine  [id number]  [facility] ([id number])  0  6.00 LME  [place] [facility]  [place]    05/06/2022 04/20/2022   1  Alprazolam 0.5 Mg Tablet   90.00  30  De Vas  [id number]  [facility] (3325)  0  3.00 LME  [facility]  [place]    05/03/2022 04/19/2022   1  Morphine Sulf Er 100 Mg Tablet   60.00  30  Ke Remy  [id number]  [facility] (9077)  0  200.00 MME  [place] [facility]  [place]    04/19/2022 04/19/2022   1  Oxycodone Hcl (Ir) 5 Mg Tablet   90.00  15  Ke Remy  [id number]  [facility] (9077)  0  45.00 MME  [facility]  [place]    04/08/2022 02/09/2022   1  Alprazolam 1 Mg Tablet   90.00  30  De Vas  [id number]  [facility] ([id number])  2  6.00 LME  [facility]    04/05/2022 03/23/2022   1  Morphine Sulf Er 100 Mg Tablet   60.00  30  Ke Remy  [id number]  [facility] (9077)  0  200.00 MME       [JS]      ED Course User Index  [JS] Chelsea Stevenson MD     Critical Care Time:   0    Disposition:  Discharge Note:  The patient has been re-evaluated and is ready for discharge. Reviewed available results with patient. Counseled patient on diagnosis and care plan. Patient has expressed understanding, and all questions have been answered. Patient agrees with plan and agrees to follow up as recommended, or to return to the ED if their symptoms worsen. Discharge instructions have been provided and explained to the patient, along with reasons to return to the ED. PLAN:  Discharge Medication List as of 5/14/2022  5:06 PM      START taking these medications    Details   naloxone (Narcan) 4 mg/actuation nasal spray Use 1 spray intranasally, then discard. Repeat with new spray every 2 min as needed for opioid overdose symptoms, alternating nostrils. , Normal, Disp-1 Each, R-0         CONTINUE these medications which have NOT CHANGED    Details   azithromycin (ZITHROMAX) 250 mg tablet Historical Med      buPROPion SR (WELLBUTRIN SR) 100 mg SR tablet Historical Med      fluconazole (DIFLUCAN) 150 mg tablet Historical Med      spironolactone (ALDACTONE) 25 mg tablet Historical Med      HumaLOG U-100 Insulin 100 unit/mL injection USE AS DIRECTED FOR INSULIN PUMP.  UNITS PER DAY. DX E10.65--replaces novolog, Normal, Disp-30 mL, R-11, RUFINO      meclizine (ANTIVERT) 25 mg tablet Take 1 Tablet by mouth three (3) times daily as needed for Dizziness., Normal, Disp-20 Tablet, R-0      BD Insulin Syringe Ultra-Fine 0.5 mL 31 gauge x 5/16\" syrg USE ONE SYRINGE - FOUR TIMES A DAY, Normal, Disp-100 Each, R-11      mirtazapine (REMERON) 7.5 mg tablet Take 1 Tablet by mouth daily. , Historical Med      morphine CR (MS CONTIN) 100 mg CR tablet Take 1 Tablet by mouth two (2) times a day., Historical Med      gabapentin (NEURONTIN) 300 mg capsule Take 2 Capsules by mouth four (4) times daily. Max Daily Amount: 2,400 mg. Dose change 03/10/22--updated med list--did not send prescription to the pharmacy, No Print, Disp-540 Capsule, R-1      carvediloL (COREG) 6.25 mg tablet TAKE ONE TABLET BY MOUTH DAILY, Normal, Disp-90 Tablet, R-3      Accu-Chek Fastclix Lancet Drum misc Use to test up to 8 times per day. DX Code: 10.65--delete prescription for softclix sent in error, Normal, Disp-800 Each, R-3, RUFINO      Accu-Chek Guide test strips strip Use as directed to test up to 8 times per day. Dx E10.65, Normal, Disp-250 Strip, R-11, RUFINO      elagolix (Orilissa) 150 mg tab Take 150 mg by mouth daily. , Historical Med      aspirin delayed-release 81 mg tablet Take 81 mg by mouth daily. , Historical Med      diclofenac (Voltaren) 1 % gel Apply 2 g to affected area four (4) times daily as needed for Pain.  RT KNEE, Historical Med      oxyCODONE IR (ROXICODONE) 5 mg immediate release tablet Take 5 mg by mouth every four (4) hours as needed for Pain., Historical Med      rosuvastatin (Crestor) 40 mg tablet Take 40 mg by mouth daily. , Historical Med      glucagon (Glucagon Emergency Kit, human,) 1 mg injection USE AS DIRECTED, Normal, Disp-2 Vial, R-11      ARIPiprazole (Abilify) 30 mg tablet Take 30 mg by mouth daily. , Historical Med      brexpiprazole (Rexulti) 0.5 mg tab tablet Take 1 mg by mouth two (2) times a day., Historical Med      busPIRone (BUSPAR) 10 mg tablet Take 20 mg by mouth two (2) times a day., Historical Med      ALPRAZolam (XANAX) 1 mg tablet Take 1 mg by mouth three (3) times daily as needed., Historical Med      fluticasone propionate (FLONASE) 50 mcg/actuation nasal spray 2 Sprays by Both Nostrils route daily as needed for Rhinitis., Normal, Disp-16 g, R-3      bumetanide (BUMEX) 2 mg tablet Take 2 mg by mouth two (2) times a day., Historical Med      esomeprazole (NEXIUM) 40 mg capsule TAKE ONE CAPSULE BY MOUTH DAILY, Normal, Disp-30 Cap, R-11      insulin pump (PATIENT SUPPLIED) misc by SubCUTAneous route as needed. Insulin Type: NOVOLOG  Basal Dose:  5346-7320: 1.55 units/hr  3560-7851: 1.65 units/hr    Target -4584: 100-130 mg/dL: 1290-9159: 130-150 mg/dL  Corrective Dose:  unit per  mg/dL over  mg/dL  Insulin: Carb ratio:  1 units: 12 grams  Last time insulin pump supplies changed: 21  Last time insulin pump refilled: 21, Historical Med      citalopram (CELEXA) 20 mg tablet Take 40 mg by mouth daily. , Historical Med      levonorgestrel (MIRENA) 20 mcg/24 hr (5 years) IUD 1 Device by IntraUTERine route once., Historical Med           2. Follow-up Information     Follow up With Specialties Details Why Contact Info    June Cuba MD Family Medicine Schedule an appointment as soon as possible for a visit   [address] [facility]  [phone]          3. Return to ED if worse     Diagnosis     Clinical Impression:   1. Lightheadedness    2.  Polypharmacy 3. Chronic kidney disease, unspecified CKD stage        Attestations:    Nikc Gooden M.D. Please note that this dictation was completed with sciencebite, the computer voice recognition software. Quite often unanticipated grammatical, syntax, homophones, and other interpretive errors are inadvertently transcribed by the computer software. Please disregard these errors. Please excuse any errors that have escaped final proofreading. Thank you.

## 2022-06-15 LAB
BUN SERPL-MCNC: 18 MG/DL (ref 6–24)
BUN/CREAT SERPL: 14 (ref 9–23)
CALCIUM SERPL-MCNC: 8.9 MG/DL (ref 8.7–10.2)
CHLORIDE SERPL-SCNC: 99 MMOL/L (ref 96–106)
CHOLEST SERPL-MCNC: 114 MG/DL (ref 100–199)
CO2 SERPL-SCNC: 25 MMOL/L (ref 20–29)
CREAT SERPL-MCNC: 1.28 MG/DL (ref 0.57–1)
EGFR: 51 ML/MIN/1.73
EST. AVERAGE GLUCOSE BLD GHB EST-MCNC: 223 MG/DL
GLUCOSE SERPL-MCNC: 117 MG/DL (ref 65–99)
HBA1C MFR BLD: 9.4 % (ref 4.8–5.6)
HDLC SERPL-MCNC: 40 MG/DL
IMP & REVIEW OF LAB RESULTS: NORMAL
INTERPRETATION: NORMAL
LDLC SERPL CALC-MCNC: 52 MG/DL (ref 0–99)
POTASSIUM SERPL-SCNC: 4.5 MMOL/L (ref 3.5–5.2)
SODIUM SERPL-SCNC: 137 MMOL/L (ref 134–144)
TRIGL SERPL-MCNC: 124 MG/DL (ref 0–149)
VLDLC SERPL CALC-MCNC: 22 MG/DL (ref 5–40)

## 2022-06-16 ENCOUNTER — OFFICE VISIT (OUTPATIENT)
Dept: ENDOCRINOLOGY | Age: 52
End: 2022-06-16
Payer: MEDICARE

## 2022-06-16 VITALS
HEART RATE: 75 BPM | WEIGHT: 224.2 LBS | SYSTOLIC BLOOD PRESSURE: 130 MMHG | BODY MASS INDEX: 38.28 KG/M2 | HEIGHT: 64 IN | DIASTOLIC BLOOD PRESSURE: 70 MMHG

## 2022-06-16 DIAGNOSIS — E78.5 HYPERLIPIDEMIA LDL GOAL <100: ICD-10-CM

## 2022-06-16 DIAGNOSIS — R79.89 ELEVATED LFTS: ICD-10-CM

## 2022-06-16 DIAGNOSIS — R94.6 BORDERLINE ABNORMAL THYROID FUNCTION TEST: ICD-10-CM

## 2022-06-16 DIAGNOSIS — I10 ESSENTIAL HYPERTENSION: ICD-10-CM

## 2022-06-16 DIAGNOSIS — E10.21 TYPE 1 DIABETES MELLITUS WITH NEPHROPATHY (HCC): Primary | ICD-10-CM

## 2022-06-16 PROCEDURE — G8417 CALC BMI ABV UP PARAM F/U: HCPCS | Performed by: INTERNAL MEDICINE

## 2022-06-16 PROCEDURE — 2022F DILAT RTA XM EVC RTNOPTHY: CPT | Performed by: INTERNAL MEDICINE

## 2022-06-16 PROCEDURE — 3046F HEMOGLOBIN A1C LEVEL >9.0%: CPT | Performed by: INTERNAL MEDICINE

## 2022-06-16 PROCEDURE — G8427 DOCREV CUR MEDS BY ELIG CLIN: HCPCS | Performed by: INTERNAL MEDICINE

## 2022-06-16 PROCEDURE — G8754 DIAS BP LESS 90: HCPCS | Performed by: INTERNAL MEDICINE

## 2022-06-16 PROCEDURE — G8752 SYS BP LESS 140: HCPCS | Performed by: INTERNAL MEDICINE

## 2022-06-16 PROCEDURE — G9717 DOC PT DX DEP/BP F/U NT REQ: HCPCS | Performed by: INTERNAL MEDICINE

## 2022-06-16 PROCEDURE — 3017F COLORECTAL CA SCREEN DOC REV: CPT | Performed by: INTERNAL MEDICINE

## 2022-06-16 PROCEDURE — 99214 OFFICE O/P EST MOD 30 MIN: CPT | Performed by: INTERNAL MEDICINE

## 2022-06-16 RX ORDER — GABAPENTIN 600 MG/1
600 TABLET ORAL 4 TIMES DAILY
Qty: 360 TABLET | Refills: 1 | Status: SHIPPED | OUTPATIENT
Start: 2022-06-16

## 2022-06-16 NOTE — PATIENT INSTRUCTIONS
1) Try to take 1 tab of bumex three times a week on Mon, Wed, and Fri morning to see if this helps keep your swelling down without causing too much dehydration. 2) Your electrolytes (sodium, potassium, and calcium) are all normal.      3) Your Hemoglobin A1c (3 month test of blood sugar) has improved from 11.4% to 9.4%. 4) Current settings are as follows:  - basal: 12a: 1.4  - Carb ratio: 12a: 10, 11a: 10. 5p: 10  - sensitivity: 28  - target: 12a: 130-150  - active insulin time: 3 hours    I made your carb ratio more aggressive at breakfast and dinner to help with spikes at these 2 meals. If we can try to keep your sugar under 180 2 hours after eating then we know the bolus dose is correct for the food you ate. Use the [employer] josé miguel to help with carb counting and labels. 5) Your LDL (bad cholesterol) is excellent on the crestor and your blood pressure is controlled.

## 2022-06-16 NOTE — PROGRESS NOTES
Chief Complaint   Patient presents with    Diabetes     pcp and pharmacy confirmed     History of Present Illness: Erick Coronel is a 47 y.o. female here for follow up of diabetes. Weight up 4 lbs since last visit in 3/22. Current settings are as follows:  - basal: 12a: 1.4  - Carb ratio: 12a: 12, 11a: 10. 5p: 12  - sensitivity: 28  - target: 12a: 130-150  - active insulin time: 3 hours    Has not made any other changes to her settings since last visit. Review of her CGM data on her pump over the past 14 days shows that she is able to stay between  pretty easily overnight and during the day when not eating and not in pain and sleeping well. If her sleep is interrupted then can have spikes over 200. If she doesn't count carbs correctly, can have spikes over 200. Tends to spike most after breakfast and dinner so we'll make these settings more aggressive. She still needs to have hysterectomy with Dr. Maury Suh and although her A1c has improved, it's still over 8% so I told her we will give this another 3 months to see if her diabetes is better controlled before moving forward with surgery. She was in the ER last month for dehydration and dizziness and her creatinine was up to 2.07 but down from 2.13 in 4/22 when she was in the ER also for SOB and dizziness. She decided to stop her bumex over the past week and her creatinine was much better on her lab draw with me 2 days ago but her swelling has worsened so she took a dose yesterday and this morning. Compliant with BP and lipid regimen. The higher dose of gabapentin has helped her neuropathy and we will change to 600 mg tabs today. Current Outpatient Medications   Medication Sig    buPROPion SR (WELLBUTRIN SR) 100 mg SR tablet     fluconazole (DIFLUCAN) 150 mg tablet     spironolactone (ALDACTONE) 25 mg tablet daily.  naloxone (Narcan) 4 mg/actuation nasal spray Use 1 spray intranasally, then discard.  Repeat with new spray every 2 min as needed for opioid overdose symptoms, alternating nostrils.  HumaLOG U-100 Insulin 100 unit/mL injection USE AS DIRECTED FOR INSULIN PUMP.  UNITS PER DAY. DX E10.65--replaces novolog    meclizine (ANTIVERT) 25 mg tablet Take 1 Tablet by mouth three (3) times daily as needed for Dizziness.  BD Insulin Syringe Ultra-Fine 0.5 mL 31 gauge x 5/16\" syrg USE ONE SYRINGE - FOUR TIMES A DAY    mirtazapine (REMERON) 7.5 mg tablet Take 1 Tablet by mouth daily.  morphine CR (MS CONTIN) 100 mg CR tablet Take 1 Tablet by mouth two (2) times a day.  gabapentin (NEURONTIN) 300 mg capsule Take 2 Capsules by mouth four (4) times daily. Max Daily Amount: 2,400 mg. Dose change 03/10/22--updated med list--did not send prescription to the pharmacy    carvediloL (COREG) 6.25 mg tablet TAKE ONE TABLET BY MOUTH DAILY    Accu-Chek Fastclix Lancet Drum misc Use to test up to 8 times per day. DX Code: 10.65--delete prescription for softclix sent in error    Accu-Chek Guide test strips strip Use as directed to test up to 8 times per day. Dx E10.65    elagolix (Orilissa) 150 mg tab Take 150 mg by mouth daily.  aspirin delayed-release 81 mg tablet Take 81 mg by mouth daily.  diclofenac (Voltaren) 1 % gel Apply 2 g to affected area four (4) times daily as needed for Pain. RT KNEE    oxyCODONE IR (ROXICODONE) 5 mg immediate release tablet Take 5 mg by mouth every four (4) hours as needed for Pain.  rosuvastatin (Crestor) 40 mg tablet Take 40 mg by mouth daily.  glucagon (Glucagon Emergency Kit, human,) 1 mg injection USE AS DIRECTED    ARIPiprazole (Abilify) 30 mg tablet Take 30 mg by mouth daily.  brexpiprazole (Rexulti) 0.5 mg tab tablet Take 1 mg by mouth two (2) times a day.  busPIRone (BUSPAR) 10 mg tablet Take 20 mg by mouth two (2) times a day.  ALPRAZolam (XANAX) 1 mg tablet Take 1 mg by mouth three (3) times daily as needed.     fluticasone propionate (FLONASE) 50 mcg/actuation nasal spray 2 Sprays by Both Nostrils route daily as needed for Rhinitis.  bumetanide (BUMEX) 2 mg tablet Take 2 mg by mouth two (2) times a day.  esomeprazole (NEXIUM) 40 mg capsule TAKE ONE CAPSULE BY MOUTH DAILY    insulin pump (PATIENT SUPPLIED) misc by SubCUTAneous route as needed. Insulin Type: NOVOLOG  Basal Dose:  0510-3227: 1.55 units/hr  5211-6837: 1.65 units/hr    Target -1793: 100-130 mg/dL: 8982-7118: 130-150 mg/dL  Corrective Dose:  unit per  mg/dL over  mg/dL  Insulin: Carb ratio: 1 units: 12 grams  Last time insulin pump supplies changed: 21  Last time insulin pump refilled: 21    citalopram (CELEXA) 20 mg tablet Take 40 mg by mouth daily.  levonorgestrel (MIRENA) 20 mcg/24 hr (5 years) IUD 1 Device by IntraUTERine route once. No current facility-administered medications for this visit. Allergies   Allergen Reactions    Metolazone Other (comments)     Caused hyponatremia    Zocor [Simvastatin] Myalgia    Lisinopril Cough    Medrol [Methylprednisolone] Other (comments)     Caused severe hyperglycemia with the medrol pack     Review of Systems: PER HPI    Physical Examination:  Blood pressure 130/70, pulse 75, height 5' 4\" (1.626 m), weight 224 lb 3.2 oz (101.7 kg).   - General: pleasant, no distress, good eye contact   - Neck: no carotid bruits  - Cardiovascular: regular, normal rate, nl s1 and s2, no m/r/g,   - Respiratory: clear bilaterally  - Integumentary: 1+ edema,   - Psychiatric: normal mood and affect    Diabetic foot exam:     Left Foot:   Visual Exam: callous - mild   Pulse DP: 2+ (normal)   Filament test: reduced sensation    Vibratory sensation: diminished      Right Foot:   Visual Exam: callous - mild   Pulse DP: 2+ (normal)   Filament test: reduced sensation    Vibratory sensation: diminished        Data Reviewed:   Component      Latest Ref Rng & Units 2022          11:34 AM 11:34 AM 11:34 AM   Glucose      65 - 99 mg/dL  117 (H) BUN      6 - 24 mg/dL  18    Creatinine      0.57 - 1.00 mg/dL  1.28 (H)    eGFR      >59 mL/min/1.73  51 (L)    BUN/Creatinine ratio      9 - 23  14    Sodium      134 - 144 mmol/L  137    Potassium      3.5 - 5.2 mmol/L  4.5    Chloride      96 - 106 mmol/L  99    CO2      20 - 29 mmol/L  25    Calcium      8.7 - 10.2 mg/dL  8.9    Cholesterol, total      100 - 199 mg/dL   114   Triglyceride      0 - 149 mg/dL   124   HDL Cholesterol      >39 mg/dL   40   VLDL, calculated      5 - 40 mg/dL   22   LDL, calculated      0 - 99 mg/dL   52   Hemoglobin A1c, (calculated)      4.8 - 5.6 % 9.4 (H)     Estimated average glucose      mg/dL 223       Assessment/Plan:     1) 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): Most recent Hgb A1c was 9.4% in 6/22 down from 11.4% in 3/22 up from 9.5% in 11/21 up from 9.1% in 7/21 down from 9.6% in 4/21 stable from 1/21 up from 9.4% in 7/20 down from 9.5% in 12/19 down from 10.6% in 9/19 up from 9.3% in 7/19 down from 10.1% in 5/19 up from 10% in 1/19 up from 9.2% in 10/18 up from 9% in 7/18 up from 8.2% in 4/18 down from 8.8% in 11/17 up from 7.4% in 9/17 down from 9.4% in 2/17 up from 9.1% in 11/16 down from 10% in 3/16 up from 9.4% in 12/15 up from 9.2% in 9/15 down from 9.7% in 5/15 down from 10.3% in 1/15 up from 8.9% in 10/14 down from 11.1% in 4/14 up from 9.2% in 1/14 up from 8.6% in March 2013 down from 8.9% in November down from 9.7% in Aug 2012 up from 9.2% in October 2011 up from 8.7% in May down from 9.7% in March up from 9.1% in July 2010 down from 10.2% in January down from 13.7% in October 2009. Made her carb ratios more aggressive at breakfast and dinner to help with post meal spikes. Want her A1c under 8% to have hysterectomy.   - cont current pump settings aside from change below  - check bs 8 times daily due to fluctuating blood sugars  - foot exam done 6/22  - microalbumin was 69 in 7/10 up to 392 in March 2011 and 621 in May down to 216 in October 2011 and 37.3 in 4/14 and up to 183 in 12/15 and 300 in 9/16, down to 85 in 4/18 and 10.2 in 9/19 and normal ever since, last in 11/21  - optho UTD 1/21  - check A1c and cmp and microalbumin prior to next visit        2) HTN NOS (401.9): Blood pressure was at goal < 140/90   - cont current regimen  - just take bumex 2 mg 3x/week to avoid dehydration      3) Hyperlipidemia (272.4): Given DM, goal LDL is < 100, non-HDL < 130, and TGs < 150. LDL was 146 in January 2010, down to 124 in July and 93 in March 2011 with taking 5 mg of crestor daily. Her crestor was changed to pravastatin by Dr. Any Doyle because of cost in Feb 2012. LDL 93 in 8/12 but her HDL was high at 138 due to ETOH intake. LDL down to 39 in 11/12 but up to 102 in 3/13. Off pravastatin at this time and previously was on 10 mg daily.  in 1/14. Up to 167 in 4/14 so started lovastatin 20 mg daily but she couldn't afford this. She has been started on crestor 40 mg daily by crossover clinic and LDL 64 in 10/14 and 55 in 1/15. Was 37 in 3/16 so dose decreased to 20 mg daily and LDL 23 in 2/17. Was changed to lipitor 1/2 of 80 mg daily when clinic couldn't get crestor any longer and LDL 37 in 9/17. This was stopped during her hospital stay in 11/17 but was restarted by Dr. Yajaira Rievro in 12/17 and 42 in 4/18. Up to 94 in 8/18. Down to 79 in 1/19 and 88 in 9/19 and 78 in 12/19 and 61 in 7/20 and 54 in 1/21. Changed to crestor 40 mg sometime in the spring of 2021 and LDL 86 in 11/21 and 54 in 3/22 and 52 in 6/22  - cont crestor 40 mg daily  - check lipids in 2023      4) Elevated LFTs (790.6): I told her previously that her LFTs were elevated likely due to ETOH intake so I advised her to cut back on this and her repeat LFTs were normal in 3/13 and 1/14 and 4/14 and 10/14 and 1/15 and 12/15 and 3/16. AST up to 76 in 2/17 but back to normal in 5/17 and has been normal ever since so will follow this.       5) Borderline abnormal TFT: TSH 2.1 in 12/11 but up to 4.01 in 11/12 and 4.75 in 11/17 during 2 hospital stays. Up to 5.45 in 1/19 with Dr. Elizabeth Parry. Repeat TSH 1.24 and FT4 0.95 and TPO ab 21 in 2/19 so held on any treatment. TSH up to 3.36 in 9/19 but down to 2.61 in 12/19 and 1.59 in 7/19 and 1.48 in 7/21 and 2.88 in 11/21  - check TSH prior to next visit      Patient Instructions   1) Try to take 1 tab of bumex three times a week on Mon, Wed, and Fri morning to see if this helps keep your swelling down without causing too much dehydration. 2) Your electrolytes (sodium, potassium, and calcium) are all normal.      3) Your Hemoglobin A1c (3 month test of blood sugar) has improved from 11.4% to 9.4%. 4) Current settings are as follows:  - basal: 12a: 1.4  - Carb ratio: 12a: 10, 11a: 10. 5p: 10  - sensitivity: 28  - target: 12a: 130-150  - active insulin time: 3 hours    I made your carb ratio more aggressive at breakfast and dinner to help with spikes at these 2 meals. If we can try to keep your sugar under 180 2 hours after eating then we know the bolus dose is correct for the food you ate. Use the [employer] josé miguel to help with carb counting and labels. 5) Your LDL (bad cholesterol) is excellent on the crestor and your blood pressure is controlled.             Follow-up and Dispositions    · Return 9/15/22 at 10:30am.               Copy sent to:  Dr. Karla Davies

## 2022-06-24 RX ORDER — GLUCAGON 1 MG
VIAL (EA) INJECTION
Qty: 2 EACH | Refills: 11 | Status: SHIPPED | OUTPATIENT
Start: 2022-06-24

## 2022-06-29 ENCOUNTER — TELEPHONE (OUTPATIENT)
Dept: ENDOCRINOLOGY | Age: 52
End: 2022-06-29

## 2022-06-30 ENCOUNTER — APPOINTMENT (OUTPATIENT)
Dept: GENERAL RADIOLOGY | Age: 52
End: 2022-06-30
Attending: EMERGENCY MEDICINE
Payer: MEDICARE

## 2022-06-30 ENCOUNTER — HOSPITAL ENCOUNTER (EMERGENCY)
Age: 52
Discharge: HOME | End: 2022-06-30
Attending: EMERGENCY MEDICINE
Payer: MEDICARE

## 2022-06-30 ENCOUNTER — TELEPHONE (OUTPATIENT)
Dept: ENDOCRINOLOGY | Age: 52
End: 2022-06-30

## 2022-06-30 VITALS
TEMPERATURE: 98.8 F | BODY MASS INDEX: 39.19 KG/M2 | DIASTOLIC BLOOD PRESSURE: 69 MMHG | OXYGEN SATURATION: 97 % | WEIGHT: 235.23 LBS | HEART RATE: 95 BPM | SYSTOLIC BLOOD PRESSURE: 104 MMHG | HEIGHT: 65 IN | RESPIRATION RATE: 20 BRPM

## 2022-06-30 DIAGNOSIS — R73.9 HYPERGLYCEMIA: Primary | ICD-10-CM

## 2022-06-30 DIAGNOSIS — N17.9 AKI (ACUTE KIDNEY INJURY) (HCC): ICD-10-CM

## 2022-06-30 LAB
ALBUMIN SERPL-MCNC: 3.2 G/DL (ref 3.5–5)
ALBUMIN/GLOB SERPL: 1.1 {RATIO} (ref 1.1–2.2)
ALP SERPL-CCNC: 98 U/L (ref 45–117)
ALT SERPL-CCNC: 22 U/L (ref 12–78)
AMORPH CRY URNS QL MICRO: ABNORMAL
ANION GAP SERPL CALC-SCNC: 5 MMOL/L (ref 5–15)
APPEARANCE UR: CLEAR
AST SERPL-CCNC: 30 U/L (ref 15–37)
BACTERIA URNS QL MICRO: NEGATIVE /HPF
BASE EXCESS BLD CALC-SCNC: 3.5 MMOL/L
BASOPHILS # BLD: 0 K/UL (ref 0–0.1)
BASOPHILS NFR BLD: 0 % (ref 0–1)
BILIRUB SERPL-MCNC: 0.4 MG/DL (ref 0.2–1)
BILIRUB UR QL: NEGATIVE
BUN SERPL-MCNC: 37 MG/DL (ref 6–20)
BUN/CREAT SERPL: 17 (ref 12–20)
CA-I BLD-MCNC: 1.05 MMOL/L (ref 1.12–1.32)
CALCIUM SERPL-MCNC: 8.1 MG/DL (ref 8.5–10.1)
CHLORIDE BLD-SCNC: 92 MMOL/L (ref 100–108)
CHLORIDE SERPL-SCNC: 93 MMOL/L (ref 97–108)
CO2 BLD-SCNC: 30 MMOL/L (ref 19–24)
CO2 SERPL-SCNC: 34 MMOL/L (ref 21–32)
COLOR UR: ABNORMAL
CREAT SERPL-MCNC: 2.19 MG/DL (ref 0.55–1.02)
CREAT UR-MCNC: 1.9 MG/DL (ref 0.6–1.3)
DIFFERENTIAL METHOD BLD: ABNORMAL
EOSINOPHIL # BLD: 0.1 K/UL (ref 0–0.4)
EOSINOPHIL NFR BLD: 2 % (ref 0–7)
EPITH CASTS URNS QL MICRO: ABNORMAL /LPF
ERYTHROCYTE [DISTWIDTH] IN BLOOD BY AUTOMATED COUNT: 14.8 % (ref 11.5–14.5)
GLOBULIN SER CALC-MCNC: 3 G/DL (ref 2–4)
GLUCOSE BLD STRIP.AUTO-MCNC: 216 MG/DL (ref 65–117)
GLUCOSE BLD STRIP.AUTO-MCNC: 436 MG/DL (ref 74–106)
GLUCOSE BLD STRIP.AUTO-MCNC: 460 MG/DL (ref 65–117)
GLUCOSE BLD STRIP.AUTO-MCNC: 63 MG/DL (ref 65–117)
GLUCOSE BLD STRIP.AUTO-MCNC: 64 MG/DL (ref 65–117)
GLUCOSE BLD STRIP.AUTO-MCNC: 75 MG/DL (ref 65–117)
GLUCOSE BLD STRIP.AUTO-MCNC: 75 MG/DL (ref 65–117)
GLUCOSE BLD STRIP.AUTO-MCNC: 84 MG/DL (ref 65–117)
GLUCOSE SERPL-MCNC: 429 MG/DL (ref 65–100)
GLUCOSE UR STRIP.AUTO-MCNC: >1000 MG/DL
GRAN CASTS URNS QL MICRO: ABNORMAL /LPF
HCO3 BLDA-SCNC: 30 MMOL/L
HCT VFR BLD AUTO: 32.9 % (ref 35–47)
HGB BLD-MCNC: 10.7 G/DL (ref 11.5–16)
HGB UR QL STRIP: NEGATIVE
IMM GRANULOCYTES # BLD AUTO: 0 K/UL (ref 0–0.04)
IMM GRANULOCYTES NFR BLD AUTO: 0 % (ref 0–0.5)
KETONES UR QL STRIP.AUTO: NEGATIVE MG/DL
LACTATE BLD-SCNC: 2.23 MMOL/L (ref 0.4–2)
LEUKOCYTE ESTERASE UR QL STRIP.AUTO: NEGATIVE
LYMPHOCYTES # BLD: 2 K/UL (ref 0.8–3.5)
LYMPHOCYTES NFR BLD: 24 % (ref 12–49)
MCH RBC QN AUTO: 28.9 PG (ref 26–34)
MCHC RBC AUTO-ENTMCNC: 32.5 G/DL (ref 30–36.5)
MCV RBC AUTO: 88.9 FL (ref 80–99)
MONOCYTES # BLD: 0.7 K/UL (ref 0–1)
MONOCYTES NFR BLD: 9 % (ref 5–13)
MUCOUS THREADS URNS QL MICRO: ABNORMAL /LPF
NEUTS SEG # BLD: 5.4 K/UL (ref 1.8–8)
NEUTS SEG NFR BLD: 65 % (ref 32–75)
NITRITE UR QL STRIP.AUTO: NEGATIVE
NRBC # BLD: 0 K/UL (ref 0–0.01)
NRBC BLD-RTO: 0 PER 100 WBC
PCO2 BLDV: 49.6 MMHG (ref 41–51)
PH BLDV: 7.38 [PH] (ref 7.32–7.42)
PH UR STRIP: 5.5 [PH] (ref 5–8)
PLATELET # BLD AUTO: 245 K/UL (ref 150–400)
PMV BLD AUTO: 10.6 FL (ref 8.9–12.9)
PO2 BLDV: 45 MMHG (ref 25–40)
POTASSIUM BLD-SCNC: 4.3 MMOL/L (ref 3.5–5.5)
POTASSIUM SERPL-SCNC: 4.7 MMOL/L (ref 3.5–5.1)
PROT SERPL-MCNC: 6.2 G/DL (ref 6.4–8.2)
PROT UR STRIP-MCNC: ABNORMAL MG/DL
RBC # BLD AUTO: 3.7 M/UL (ref 3.8–5.2)
RBC #/AREA URNS HPF: ABNORMAL /HPF (ref 0–5)
SERVICE CMNT-IMP: ABNORMAL
SERVICE CMNT-IMP: NORMAL
SODIUM BLD-SCNC: 132 MMOL/L (ref 136–145)
SODIUM SERPL-SCNC: 132 MMOL/L (ref 136–145)
SP GR UR REFRACTOMETRY: 1.02
SPECIMEN SITE: ABNORMAL
TROPONIN-HIGH SENSITIVITY: 13 NG/L (ref 0–51)
UA: UC IF INDICATED,UAUC: ABNORMAL
UROBILINOGEN UR QL STRIP.AUTO: 0.2 EU/DL (ref 0.2–1)
WBC # BLD AUTO: 8.3 K/UL (ref 3.6–11)
WBC URNS QL MICRO: ABNORMAL /HPF (ref 0–4)

## 2022-06-30 PROCEDURE — 82962 GLUCOSE BLOOD TEST: CPT

## 2022-06-30 PROCEDURE — 36415 COLL VENOUS BLD VENIPUNCTURE: CPT

## 2022-06-30 PROCEDURE — 85025 COMPLETE CBC W/AUTO DIFF WBC: CPT

## 2022-06-30 PROCEDURE — 74011636637 HC RX REV CODE- 636/637: Performed by: EMERGENCY MEDICINE

## 2022-06-30 PROCEDURE — 99284 EMERGENCY DEPT VISIT MOD MDM: CPT

## 2022-06-30 PROCEDURE — 71045 X-RAY EXAM CHEST 1 VIEW: CPT

## 2022-06-30 PROCEDURE — 96375 TX/PRO/DX INJ NEW DRUG ADDON: CPT

## 2022-06-30 PROCEDURE — 81001 URINALYSIS AUTO W/SCOPE: CPT

## 2022-06-30 PROCEDURE — 84132 ASSAY OF SERUM POTASSIUM: CPT

## 2022-06-30 PROCEDURE — 80053 COMPREHEN METABOLIC PANEL: CPT

## 2022-06-30 PROCEDURE — 74011250636 HC RX REV CODE- 250/636: Performed by: EMERGENCY MEDICINE

## 2022-06-30 PROCEDURE — 96374 THER/PROPH/DIAG INJ IV PUSH: CPT

## 2022-06-30 PROCEDURE — 84484 ASSAY OF TROPONIN QUANT: CPT

## 2022-06-30 RX ORDER — ONDANSETRON 2 MG/ML
4 INJECTION INTRAMUSCULAR; INTRAVENOUS
Status: COMPLETED | OUTPATIENT
Start: 2022-06-30 | End: 2022-06-30

## 2022-06-30 RX ORDER — MORPHINE SULFATE 2 MG/ML
4 INJECTION, SOLUTION INTRAMUSCULAR; INTRAVENOUS
Status: COMPLETED | OUTPATIENT
Start: 2022-06-30 | End: 2022-06-30

## 2022-06-30 RX ADMIN — ONDANSETRON 4 MG: 2 INJECTION INTRAMUSCULAR; INTRAVENOUS at 16:49

## 2022-06-30 RX ADMIN — MORPHINE SULFATE 4 MG: 2 INJECTION, SOLUTION INTRAMUSCULAR; INTRAVENOUS at 16:49

## 2022-06-30 RX ADMIN — Medication 10 UNITS: at 15:55

## 2022-06-30 RX ADMIN — SODIUM CHLORIDE 1000 ML: 9 INJECTION, SOLUTION INTRAVENOUS at 15:55

## 2022-06-30 NOTE — TELEPHONE ENCOUNTER
Received a page at 7:50pm from patient that her blood sugars were running in the 400s and her transmitter stopped working. She is due to have a new one shipped to her tomorrow from [employer]. She has changed her pump site yesterday and again today and thinks her pump is still working and delivering insulin and is getting over bronchitis a week ago. She had already taken a manual bolus via syringe of 14 units about an hour before her call with me. I advised her to keep an eye on her sugars and hopefully they will come down overnight and with getting the new transmitter, she'll be able to get back in automode to give her better control. she voiced understanding of this plan.

## 2022-06-30 NOTE — ED TRIAGE NOTES
Pt comes in from home via ems w/ cc hyperglycemia. Reports the transmitter from her insulin pump broke yesterday. Today. home glucose read at 758. Pt self-administered 15 units R insuline prior to ems arrival. EMS reports pt's spo2 was 85-89% on room air while en route. Pt states she wears 3L nc at baseline at home. Denies SOB in ED.

## 2022-06-30 NOTE — TELEPHONE ENCOUNTER
Ms Luther Beasley states her transmitter for her insulin pump broke and she is awaiting a new one. Pt states her blood glucose has been in the 500's since yesterday and states her blood glucose level was 686 when last checked earlier this afternoon. Pt says she has been using a sliding scale while waiting for her transmitter. Per Dr Darren Guevara, asked pt to go directly to the ER. Pt verbalized understanding and stated she will change her pump site then head to the emergency room.

## 2022-06-30 NOTE — ED NOTES
Bedside shift change report given to Blane (oncoming nurse) by Jah Ruiz RN (offgoing nurse). Report included the following information SBAR, ED Summary, Intake/Output, MAR and Recent Results.

## 2022-06-30 NOTE — TELEPHONE ENCOUNTER
6/30/2022    Pt called and left a vm at 1:38 pm stating she needs to speak with Dr. Melissa Smith her blood sugar is 686. No additonal information was disclosed. Pt can be reached at [phone].     Thanks,  Clara Cook

## 2022-07-01 NOTE — ED PROVIDER NOTES
EMERGENCY DEPARTMENT HISTORY AND PHYSICAL EXAM      Date: 6/30/2022  Patient Name: Marta Hastings    History of Presenting Illness     Chief Complaint   Patient presents with    Shortness of Breath     denies at this time. States she wears 3L nc at baseline.  High Blood Sugar       History Provided By: Patient    HPI: Marta Hastings, 47 y.o. female with history of chronic respiratory failure requiring 3 L nasal cannula baseline, type 1 insulin-dependent diabetes mellitus, CKD,, presents to the ED with cc of hyperglycemia. Patient uses an insulin pump and states that her transmitter has not been working for the past few days, her glucose was noted to be as high as 700 and she was referred to the ED by her endocrinologist, Dr. Magaly Winn. She uses her constant insulin pump which has been still functioning despite the transmitter, however she has been guessing as to her rapid acting sliding scale insulin given that her transmitter is not working. She denies any fevers, chills, or recent illness. Paramedics transported her without her oxygen and she felt short of breath upon arrival with low oxygen levels, she was placed on her baseline home O2 3 L upon arrival in the ED with resolution of her O2 sats. No shortness of breath upon my evaluation. She denies any abdominal pain, fevers, or recent illness. There are no other complaints, changes, or physical findings at this time. PCP: Jelani Hutchins MD    No current facility-administered medications on file prior to encounter. Current Outpatient Medications on File Prior to Encounter   Medication Sig Dispense Refill    glucagon (Glucagon Emergency Kit, human,) 1 mg solr USE AS DIRECTED IN KIT INSTRUCTIONS 2 Each 11    gabapentin (NEURONTIN) 600 mg tablet Take 1 Tablet by mouth four (4) times daily. Max Daily Amount: 2,400 mg.  Replaces the 300 mg caps 360 Tablet 1    buPROPion SR (WELLBUTRIN SR) 100 mg SR tablet       fluconazole (DIFLUCAN) 150 mg tablet       spironolactone (ALDACTONE) 25 mg tablet daily.  naloxone (Narcan) 4 mg/actuation nasal spray Use 1 spray intranasally, then discard. Repeat with new spray every 2 min as needed for opioid overdose symptoms, alternating nostrils. 1 Each 0    HumaLOG U-100 Insulin 100 unit/mL injection USE AS DIRECTED FOR INSULIN PUMP.  UNITS PER DAY. DX E10.65--replaces novolog 30 mL 11    meclizine (ANTIVERT) 25 mg tablet Take 1 Tablet by mouth three (3) times daily as needed for Dizziness. 20 Tablet 0    BD Insulin Syringe Ultra-Fine 0.5 mL 31 gauge x 5/16\" syrg USE ONE SYRINGE - FOUR TIMES A  Each 11    mirtazapine (REMERON) 7.5 mg tablet Take 1 Tablet by mouth daily.  morphine CR (MS CONTIN) 100 mg CR tablet Take 1 Tablet by mouth two (2) times a day.  carvediloL (COREG) 6.25 mg tablet TAKE ONE TABLET BY MOUTH DAILY 90 Tablet 3    Accu-Chek Fastclix Lancet Drum misc Use to test up to 8 times per day. DX Code: 10.65--delete prescription for softclix sent in error 800 Each 3    Accu-Chek Guide test strips strip Use as directed to test up to 8 times per day. Dx E10.65 250 Strip 11    elagolix (Orilissa) 150 mg tab Take 150 mg by mouth daily.  aspirin delayed-release 81 mg tablet Take 81 mg by mouth daily.  diclofenac (Voltaren) 1 % gel Apply 2 g to affected area four (4) times daily as needed for Pain. RT KNEE      oxyCODONE IR (ROXICODONE) 5 mg immediate release tablet Take 5 mg by mouth every four (4) hours as needed for Pain.  rosuvastatin (Crestor) 40 mg tablet Take 40 mg by mouth daily.  ARIPiprazole (Abilify) 30 mg tablet Take 30 mg by mouth daily.  brexpiprazole (Rexulti) 0.5 mg tab tablet Take 1 mg by mouth two (2) times a day.  busPIRone (BUSPAR) 10 mg tablet Take 20 mg by mouth two (2) times a day.  ALPRAZolam (XANAX) 1 mg tablet Take 1 mg by mouth three (3) times daily as needed.       fluticasone propionate (FLONASE) 50 mcg/actuation nasal spray 2 Sprays by Both Nostrils route daily as needed for Rhinitis. 16 g 3    bumetanide (BUMEX) 2 mg tablet Take 2 mg by mouth three (3) days a week.  esomeprazole (NEXIUM) 40 mg capsule TAKE ONE CAPSULE BY MOUTH DAILY 30 Cap 11    insulin pump (PATIENT SUPPLIED) misc by SubCUTAneous route as needed. Insulin Type: NOVOLOG  Basal Dose:  9534-3825: 1.55 units/hr  2962-5281: 1.65 units/hr    Target -2541: 100-130 mg/dL: 9738-3632: 130-150 mg/dL  Corrective Dose:  unit per  mg/dL over  mg/dL  Insulin: Carb ratio: 1 units: 12 grams  Last time insulin pump supplies changed: 21  Last time insulin pump refilled: 21      citalopram (CELEXA) 20 mg tablet Take 40 mg by mouth daily.  levonorgestrel (MIRENA) 20 mcg/24 hr (5 years) IUD 1 Device by IntraUTERine route once.          Past History     Past Medical History:  Past Medical History:   Diagnosis Date    Achilles tendon rupture     along with torn right patellar/femoral ligament    Arthritis     rt. foot    Chronic kidney disease     Chronic pain     abdominal pain    Diabetes (HCC)     IDDM on insulin pump and sensor    DM type 1 (diabetes mellitus, type 1) ([facility])     age 25    Endometriosis     s/p ex-lap 4x    Gastric ulcer     GERD (gastroesophageal reflux disease)     Herpes     Other and unspecified hyperlipidemia     Panic attacks     Psychiatric disorder     anxiety, panic attacks    PTSD (post-traumatic stress disorder)     Unspecified essential hypertension        Past Surgical History:  Past Surgical History:   Procedure Laterality Date    HX COLONOSCOPY      HX GYN      2 d&c's    HX GYN      laparoscopies x5 for endometriosis    HX GYN      mirena in place    HX ORTHOPAEDIC  2002    achiles tendon repair,talus repair    HX ORTHOPAEDIC      injections x2 to right shoulder    HX ORTHOPAEDIC Left 2019    3rd trigger finger release    LA CARDIAC SURG PROCEDURE UNLIST         Family History:  Family History   Problem Relation Age of Onset    Diabetes Mother         diet controlled    Diabetes Father         R foot amupation    Liver Disease Father     Heart Disease Paternal Uncle         MI in his 46s    Stroke Neg Hx        Social History:  Social History     Tobacco Use    Smoking status: Current Every Day Smoker     Packs/day: 1.00     Years: 28.00     Pack years: 28.00     Types: Cigarettes    Smokeless tobacco: Current User   Substance Use Topics    Alcohol use: Not Currently     Comment: a beer every few months    Drug use: No     Types: OTC, Prescription       Allergies: Allergies   Allergen Reactions    Metolazone Other (comments)     Caused hyponatremia    Zocor [Simvastatin] Myalgia    Lisinopril Cough    Medrol [Methylprednisolone] Other (comments)     Caused severe hyperglycemia with the medrol pack         Review of Systems   Review of Systems   Constitutional: Positive for fatigue. Negative for chills and fever. Respiratory: Negative for cough. Cardiovascular: Negative for chest pain and leg swelling. Gastrointestinal: Negative for abdominal pain, nausea and vomiting. Endocrine: Positive for polydipsia and polyuria. Genitourinary: Negative. Musculoskeletal: Negative for back pain and gait problem. Skin: Negative for color change and rash. Neurological: Positive for light-headedness. Negative for dizziness, weakness and headaches. Hematological: Does not bruise/bleed easily. All other systems reviewed and are negative. Physical Exam   Physical Exam  Vitals and nursing note reviewed. Constitutional:       General: She is not in acute distress. Appearance: Normal appearance. She is not ill-appearing or toxic-appearing. HENT:      Head: Normocephalic and atraumatic. Nose: Nose normal.      Mouth/Throat:      Comments: Dry mucous membranes  Eyes:      Extraocular Movements: Extraocular movements intact.       Pupils: Pupils are equal, round, and reactive to light. Cardiovascular:      Rate and Rhythm: Normal rate and regular rhythm. Heart sounds: No murmur heard. Pulmonary:      Effort: Pulmonary effort is normal. No respiratory distress. Breath sounds: Normal breath sounds. No wheezing. Abdominal:      General: There is no distension. Palpations: Abdomen is soft. Tenderness: There is no abdominal tenderness. There is no guarding or rebound. Musculoskeletal:         General: No swelling or tenderness. Normal range of motion. Cervical back: Normal range of motion and neck supple. Right lower leg: No edema. Left lower leg: No edema. Skin:     General: Skin is warm and dry. Coloration: Skin is not pale. Findings: No erythema. Neurological:      General: No focal deficit present. Mental Status: She is alert and oriented to person, place, and time. Diagnostic Study Results     Labs -   No results found for this or any previous visit (from the past 12 hour(s)). Radiologic Studies -   XR CHEST PORT   Final Result   1. Bibasilar opacities likely represent atelectasis, but may represent   aspiration/infection. CT Results  (Last 48 hours)    None        CXR Results  (Last 48 hours)               06/30/22 1556  XR CHEST PORT Final result    Impression:  1. Bibasilar opacities likely represent atelectasis, but may represent   aspiration/infection. Narrative:  EXAM:  XR CHEST PORT       INDICATION:   SOB       COMPARISON: Chest radiograph 4/13/2022. FINDINGS: AP radiograph of the chest was obtained. Mild bibasilar opacities. No pleural effusion or pneumothorax. Metallic   surgical device overlying the right heart noted. Heart size is within normal   limits. Tortuous thoracic aorta. No acute osseous abnormalities. Medical Decision Making   I am the first provider for this patient.     I reviewed the vital signs, available nursing notes, past medical history, past surgical history, family history and social history. Vital Signs-Reviewed the patient's vital signs. No data found. Records Reviewed: Nursing Notes    Provider Notes (Medical Decision Making):   78-year-old female with history of type 1 diabetes and chronic hypoxic respiratory failure presenting with hyperglycemia. She is afebrile and vital signs stable. Patient believes her hyperglycemia is due to her insulin pump not transmitting and she had to guess on her sign scale insulin. She appears clinically well and nontoxic without vital sign instability upon arrival in the ED. She does appear dehydrated and she endorses polyuria and polydipsia over the past few days. Laboratory evaluation demonstrating hyperglycemia with blood glucose level approximately 450, no sign of DKA. No signs of infection. Patient treated with IV fluids and IV insulin and she had resolution of her hyperglycemia. She is tolerating p.o. intake and feels well. She states that she has a new transmitter coming that should be delivered tomorrow. I encouraged close follow-up with her endocrinologist and patient feels comfortable plan for discharge home at this time. All questions answered. ED Course:   Initial assessment performed. The patients presenting problems have been discussed, and they are in agreement with the care plan formulated and outlined with them. I have encouraged them to ask questions as they arise throughout their visit. Discharge Note:  The patient has been re-evaluated and is ready for discharge. Reviewed available results with patient. Counseled patient on diagnosis and care plan. Patient has expressed understanding, and all questions have been answered. Patient agrees with plan and agrees to follow up as recommended, or to return to the ED if their symptoms worsen. Discharge instructions have been provided and explained to the patient, along with reasons to return to the ED. Disposition:  Discharge    DISCHARGE PLAN:  1. Discharge Medication List as of 6/30/2022  9:44 PM        2. Follow-up Information     Follow up With Specialties Details Why Contact Info    Quita Carrion MD Endocrinology Physician Schedule an appointment as soon as possible for a visit   [address] [facility]  [address]  [place]  [phone]      Mitra Baird MD Family Medicine Schedule an appointment as soon as possible for a visit   [address] [place] [address]  [phone]      [facility] EMERGENCY DEPT Emergency Medicine Go to  As needed, If symptoms worsen [address] [place] [address]  [address] [facility]  [phone]        3. Return to ED if worse     Diagnosis     Clinical Impression:   1. Hyperglycemia    2. DEBORAH (acute kidney injury) ([facility] [address]        Attestations:  I am the first and primary provider of record for this patient's ED encounter. I personally performed the services described above in this documentation. Mateo Galvez MD    Please note that this dictation was completed with Contrib, the computer voice recognition software. Quite often unanticipated grammatical, syntax, homophones, and other interpretive errors are inadvertently transcribed by the computer software. Please disregard these errors. Please excuse any errors that have escaped final proofreading. Thank you.

## 2022-07-01 NOTE — DISCHARGE INSTRUCTIONS
You were evaluated in the emergency department for high glucose levels. Your examination was reassuring as was your work-up including blood work, urinalysis, and chest x-ray. It will be important for you to follow-up with your primary care physician and endocrinologist in 3-7 days. If you develop worsening symptoms such as either too high or too low of blood glucose levels, please return to the emergency department immediately.

## 2022-07-01 NOTE — ED NOTES
Pt requesting to check own BG, Pt allowed to do so and told that staff will also need to repeat with hospital glucometer. Pts BG found to be 63, repeat POC obtained and value found to be 75. MD Levar Jessica made aware and pt provided 4 oz juice at this time. Will continue to monitor.

## 2022-07-02 ENCOUNTER — TELEPHONE (OUTPATIENT)
Dept: ENDOCRINOLOGY | Age: 52
End: 2022-07-02

## 2022-07-02 RX ORDER — METOCLOPRAMIDE 5 MG/1
5 TABLET ORAL 2 TIMES DAILY
Qty: 60 TABLET | Refills: 3 | OUTPATIENT
Start: 2022-07-02

## 2022-07-02 NOTE — TELEPHONE ENCOUNTER
**LATE ENTRY--PHONE CALL TOOK PLACE ON 7/2/22 AT 7:40PM**    Barbara Beckford called to let me she had been in the ER on 7/1/22 for hyperglycemia and was discharged after her sugar came down. She continued having nausea and vomiting and contacted Dr. Ta Sky, her GI doctor, and isn't able to get an appointment until August and was advised to go the ER so she went to [facility] and was there for about 8 hours and discharged with phenergan and was told her symptoms were likely from diabetic gastroparesis. I told her that I would call in reglan 5 mg tabs to take 1 tab bid to her local [facility] until she can get in with Dr. Ta Sky. she voiced understanding of this plan. I spoke with the pharmacist, Ronda Camp, who verbally took my prescription. Of note she did have a normal gastric emptying scan in Jan 2018 but this may not be the case currently. I will let her PCP, Dr. Sneha Baird, and Dr. Ta Sky know about this call.

## 2022-07-28 ENCOUNTER — TELEPHONE (OUTPATIENT)
Dept: ENDOCRINOLOGY | Age: 52
End: 2022-07-28

## 2022-07-28 NOTE — TELEPHONE ENCOUNTER
Pt notified that Dr Rita Dunham is out of the office until 08/01/2022 via [address] Informed her that message will be forwarded to Dr Rita Dunham.

## 2022-07-28 NOTE — TELEPHONE ENCOUNTER
7/28/2022  3:03 PM        Pt called today and left a voice mail at 2:13 pm.Pt would like a referral to go and see a neurologist.Pt stated her hands have been shaking for about a year now. XR#[phone]      Thanks,  Georgia Forte

## 2022-08-02 ENCOUNTER — TELEPHONE (OUTPATIENT)
Dept: ENDOCRINOLOGY | Age: 52
End: 2022-08-02

## 2022-08-03 NOTE — TELEPHONE ENCOUNTER
LVM informing pt that Dr. May Ro has sent a [employer] message that you have not yet read. Please read this as soon as possible and if you are unable to get into [employer] to read the message then please call me back and I'm happy to read the message to you.

## 2022-08-03 NOTE — TELEPHONE ENCOUNTER
Please call pt to let her know she has an unread message in [employer]:    I just returned from vacation and saw your message about wanting to see a neurologist for shaking in your hands. You can see Dr. David Carreno in neurology. [phone].

## 2022-09-01 DIAGNOSIS — R94.6 BORDERLINE ABNORMAL THYROID FUNCTION TEST: ICD-10-CM

## 2022-09-01 DIAGNOSIS — E78.5 HYPERLIPIDEMIA LDL GOAL <100: ICD-10-CM

## 2022-09-01 DIAGNOSIS — E10.21 TYPE 1 DIABETES MELLITUS WITH NEPHROPATHY (HCC): ICD-10-CM

## 2022-09-01 DIAGNOSIS — I10 ESSENTIAL HYPERTENSION: ICD-10-CM

## 2022-09-01 DIAGNOSIS — R79.89 ELEVATED LFTS: ICD-10-CM

## 2022-09-13 ENCOUNTER — OFFICE VISIT (OUTPATIENT)
Dept: ENDOCRINOLOGY | Age: 52
End: 2022-09-13
Payer: MEDICARE

## 2022-09-13 VITALS
SYSTOLIC BLOOD PRESSURE: 116 MMHG | BODY MASS INDEX: 38.55 KG/M2 | WEIGHT: 231.4 LBS | HEIGHT: 65 IN | DIASTOLIC BLOOD PRESSURE: 60 MMHG | HEART RATE: 77 BPM

## 2022-09-13 DIAGNOSIS — E10.21 TYPE 1 DIABETES MELLITUS WITH NEPHROPATHY (HCC): Primary | ICD-10-CM

## 2022-09-13 DIAGNOSIS — I10 ESSENTIAL HYPERTENSION: ICD-10-CM

## 2022-09-13 DIAGNOSIS — R94.6 BORDERLINE ABNORMAL THYROID FUNCTION TEST: ICD-10-CM

## 2022-09-13 DIAGNOSIS — E78.5 HYPERLIPIDEMIA LDL GOAL <100: ICD-10-CM

## 2022-09-13 DIAGNOSIS — R79.89 ELEVATED LFTS: ICD-10-CM

## 2022-09-13 PROCEDURE — G8427 DOCREV CUR MEDS BY ELIG CLIN: HCPCS | Performed by: INTERNAL MEDICINE

## 2022-09-13 PROCEDURE — G8752 SYS BP LESS 140: HCPCS | Performed by: INTERNAL MEDICINE

## 2022-09-13 PROCEDURE — 95251 CONT GLUC MNTR ANALYSIS I&R: CPT | Performed by: INTERNAL MEDICINE

## 2022-09-13 PROCEDURE — 3017F COLORECTAL CA SCREEN DOC REV: CPT | Performed by: INTERNAL MEDICINE

## 2022-09-13 PROCEDURE — 99214 OFFICE O/P EST MOD 30 MIN: CPT | Performed by: INTERNAL MEDICINE

## 2022-09-13 PROCEDURE — 2022F DILAT RTA XM EVC RTNOPTHY: CPT | Performed by: INTERNAL MEDICINE

## 2022-09-13 PROCEDURE — G8754 DIAS BP LESS 90: HCPCS | Performed by: INTERNAL MEDICINE

## 2022-09-13 PROCEDURE — 3046F HEMOGLOBIN A1C LEVEL >9.0%: CPT | Performed by: INTERNAL MEDICINE

## 2022-09-13 PROCEDURE — G9717 DOC PT DX DEP/BP F/U NT REQ: HCPCS | Performed by: INTERNAL MEDICINE

## 2022-09-13 PROCEDURE — G8417 CALC BMI ABV UP PARAM F/U: HCPCS | Performed by: INTERNAL MEDICINE

## 2022-09-13 RX ORDER — CEFPODOXIME PROXETIL 100 MG/1
TABLET, FILM COATED ORAL
COMMUNITY
Start: 2022-09-11

## 2022-09-13 RX ORDER — DOXYCYCLINE HYCLATE 100 MG
TABLET ORAL
COMMUNITY
Start: 2022-09-11 | End: 2022-10-03 | Stop reason: ALTCHOICE

## 2022-09-13 NOTE — PATIENT INSTRUCTIONS
1) I will send you a message through [employer] with your lab results. Please call [phone] to reset your [employer] password. 2) Your blood pressure looks great. 3) I don't see any trends that warrant changes in your regimen.

## 2022-09-13 NOTE — PROGRESS NOTES
Chief Complaint   Patient presents with    Diabetes     PCP and pharmacy confirmed      History of Present Illness: Vy Gutierrez is a 47 y.o. female here for follow up of diabetes. Weight up 7 lbs since last visit in 6/22. Current settings are as follows:  - basal: 12a: 1.4  - Carb ratio: 12a: 10, 11a: 10. 5p: 10  - sensitivity: 28  - target: 12a: 130-150  - active insulin time: 3 hours    Was admitted for 3 days from 9/8-9/11/22 for right lower extremity cellulitis and was treated with IV abx and was discharged on 2 oral abx for another 7-10 days. Did not have a DVT on ultrasound. There was question from [facility] about whether the gabapentin could be worsening her swelling but she doesn't think it's any worse on the current dose and this is controlling her neuropathy. Had labs drawn today. Down to 2 cigs/day from 2 packs/day at last visit. Will need a sleep study to evaluate for JAZ. Review of her sensor data on her pump over the past month shows she is spending a lot more time between 100-180 and is only spiking over 200 when she is in more pain or when dealing with infection. Current Outpatient Medications   Medication Sig    cefpodoxime (VANTIN) 100 mg tablet     doxycycline (VIBRA-TABS) 100 mg tablet     metoclopramide HCl (REGLAN) 5 mg tablet Take 1 Tablet by mouth two (2) times a day. (Patient taking differently: Take 5 mg by mouth as needed.)    glucagon (Glucagon Emergency Kit, human,) 1 mg solr USE AS DIRECTED IN KIT INSTRUCTIONS    gabapentin (NEURONTIN) 600 mg tablet Take 1 Tablet by mouth four (4) times daily. Max Daily Amount: 2,400 mg. Replaces the 300 mg caps    fluconazole (DIFLUCAN) 150 mg tablet     spironolactone (ALDACTONE) 25 mg tablet daily. naloxone (Narcan) 4 mg/actuation nasal spray Use 1 spray intranasally, then discard. Repeat with new spray every 2 min as needed for opioid overdose symptoms, alternating nostrils.     HumaLOG U-100 Insulin 100 unit/mL injection USE AS DIRECTED FOR INSULIN PUMP.  UNITS PER DAY. DX E10.65--replaces novolog    meclizine (ANTIVERT) 25 mg tablet Take 1 Tablet by mouth three (3) times daily as needed for Dizziness. BD Insulin Syringe Ultra-Fine 0.5 mL 31 gauge x 5/16\" syrg USE ONE SYRINGE - FOUR TIMES A DAY    mirtazapine (REMERON) 7.5 mg tablet Take 1 Tablet by mouth daily. morphine CR (MS CONTIN) 100 mg CR tablet Take 1 Tablet by mouth two (2) times a day. carvediloL (COREG) 6.25 mg tablet TAKE ONE TABLET BY MOUTH DAILY    Accu-Chek Fastclix Lancet Drum misc Use to test up to 8 times per day. DX Code: 10.65--delete prescription for softclix sent in error    Accu-Chek Guide test strips strip Use as directed to test up to 8 times per day. Dx E10.65    aspirin delayed-release 81 mg tablet Take 81 mg by mouth daily. diclofenac (VOLTAREN) 1 % gel Apply 2 g to affected area four (4) times daily as needed for Pain. RT KNEE    oxyCODONE IR (ROXICODONE) 5 mg immediate release tablet Take 5 mg by mouth every four (4) hours as needed for Pain. rosuvastatin (CRESTOR) 40 mg tablet Take 40 mg by mouth daily. brexpiprazole (REXULTI) 0.5 mg tab tablet Take 1 mg by mouth two (2) times a day. busPIRone (BUSPAR) 10 mg tablet Take 20 mg by mouth two (2) times a day. ALPRAZolam (XANAX) 1 mg tablet Take 1 mg by mouth three (3) times daily as needed. 1.5 tabs TID as needed    fluticasone propionate (FLONASE) 50 mcg/actuation nasal spray 2 Sprays by Both Nostrils route daily as needed for Rhinitis. bumetanide (BUMEX) 2 mg tablet Take 2 mg by mouth two (2) times a day. esomeprazole (NEXIUM) 40 mg capsule TAKE ONE CAPSULE BY MOUTH DAILY (Patient taking differently: two (2) times a day.)    insulin pump (PATIENT SUPPLIED) misc by SubCUTAneous route as needed. levonorgestreL (MIRENA) 20 mcg/24 hours (7 yrs) 52 mg IUD 1 Device by IntraUTERine route once.     buPROPion SR (WELLBUTRIN SR) 100 mg SR tablet      No current facility-administered medications for this visit. Allergies   Allergen Reactions    Metolazone Other (comments)     Caused hyponatremia    Zocor [Simvastatin] Myalgia    Lisinopril Cough    Medrol [Methylprednisolone] Other (comments)     Caused severe hyperglycemia with the medrol pack     Review of Systems: PER HPI    Physical Examination:  Blood pressure 116/60, pulse 77, height 5' 5\" (1.651 m), weight 231 lb 6.4 oz (105 kg). 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): Most recent Hgb A1c was 9.4% in 6/22 down from 11.4% in 3/22 up from 9.5% in 11/21 up from 9.1% in 7/21 down from 9.6% in 4/21 stable from 1/21 up from 9.4% in 7/20 down from 9.5% in 12/19 down from 10.6% in 9/19 up from 9.3% in 7/19 down from 10.1% in 5/19 up from 10% in 1/19 up from 9.2% in 10/18 up from 9% in 7/18 up from 8.2% in 4/18 down from 8.8% in 11/17 up from 7.4% in 9/17 down from 9.4% in 2/17 up from 9.1% in 11/16 down from 10% in 3/16 up from 9.4% in 12/15 up from 9.2% in 9/15 down from 9.7% in 5/15 down from 10.3% in 1/15 up from 8.9% in 10/14 down from 11.1% in 4/14 up from 9.2% in 1/14 up from 8.6% in March 2013 down from 8.9% in November down from 9.7% in Aug 2012 up from 9.2% in October 2011 up from 8.7% in May down from 9.7% in March up from 9.1% in July 2010 down from 10.2% in January down from 13.7% in October 2009. Want her A1c under 8% to have hysterectomy. No trends to warrant changes.   - cont current pump settings   - check bs 8 times daily due to fluctuating blood sugars  - foot exam done 6/22  - microalbumin was 69 in 7/10 up to 392 in March 2011 and 621 in May down to 216 in October 2011 and 37.3 in 4/14 and up to 183 in 12/15 and 300 in 9/16, down to 85 in 4/18 and 10.2 in 9/19 and normal ever since, last in 11/21  - optho UTD 1/21  - check A1c and cmp prior to next visit        2) HTN NOS (401.9): Blood pressure was at goal < 140/90   - cont current regimen  - just take bumex 2 mg 3x/week to avoid dehydration      3) Hyperlipidemia (272.4): Given DM, goal LDL is < 100, non-HDL < 130, and TGs < 150. LDL was 146 in January 2010, down to 124 in July and 93 in March 2011 with taking 5 mg of crestor daily. Her crestor was changed to pravastatin by Dr. Florencio Maldonado because of cost in Feb 2012. LDL 93 in 8/12 but her HDL was high at 138 due to ETOH intake. LDL down to 39 in 11/12 but up to 102 in 3/13. Off pravastatin at this time and previously was on 10 mg daily.  in 1/14. Up to 167 in 4/14 so started lovastatin 20 mg daily but she couldn't afford this. She has been started on crestor 40 mg daily by crossover clinic and LDL 64 in 10/14 and 55 in 1/15. Was 37 in 3/16 so dose decreased to 20 mg daily and LDL 23 in 2/17. Was changed to lipitor 1/2 of 80 mg daily when clinic couldn't get crestor any longer and LDL 37 in 9/17. This was stopped during her hospital stay in 11/17 but was restarted by Dr. Елена Garsia in 12/17 and 42 in 4/18. Up to 94 in 8/18. Down to 79 in 1/19 and 88 in 9/19 and 78 in 12/19 and 61 in 7/20 and 54 in 1/21. Changed to crestor 40 mg sometime in the spring of 2021 and LDL 86 in 11/21 and 54 in 3/22 and 52 in 6/22  - cont crestor 40 mg daily  - check lipids in 2023      4) Elevated LFTs (790.6): I told her previously that her LFTs were elevated likely due to ETOH intake so I advised her to cut back on this and her repeat LFTs were normal in 3/13 and 1/14 and 4/14 and 10/14 and 1/15 and 12/15 and 3/16. AST up to 76 in 2/17 but back to normal in 5/17 and has been normal ever since so will follow this.       5) Borderline abnormal TFT: TSH 2.1 in 12/11 but up to 4.01 in 11/12 and 4.75 in 11/17 during 2 hospital stays. Up to 5.45 in 1/19 with Dr. No Tomlin. Repeat TSH 1.24 and FT4 0.95 and TPO ab 21 in 2/19 so held on any treatment. TSH up to 3.36 in 9/19 but down to 2.61 in 12/19 and 1.59 in 7/19 and 1.48 in 7/21 and 2.88 in 11/21  - check TSH in 9/23      Patient Instructions   1) I will send you a message through [facility] with your lab results. Please call [phone] to reset your [employer] password. 2) Your blood pressure looks great. 3) I don't see any trends that warrant changes in your regimen. Follow-up and Dispositions    Return 12/13/22 at 9:30am.               Copy sent to:  Dr. Mookie Finley      Lab follow up: 09/17/22  Component      Latest Ref Rng & Units 9/13/2022 9/13/2022 9/13/2022 9/13/2022          10:22 AM 10:22 AM 10:22 AM 10:22 AM   Glucose      65 - 99 mg/dL    473 (H)   BUN      6 - 24 mg/dL    21   Creatinine      0.57 - 1.00 mg/dL    1.31 (H)   eGFR      >59 mL/min/1.73    49 (L)   BUN/Creatinine ratio      9 - 23    16   Sodium      134 - 144 mmol/L    130 (L)   Potassium      3.5 - 5.2 mmol/L    4.7   Chloride      96 - 106 mmol/L    94 (L)   CO2      20 - 29 mmol/L    22   Calcium      8.7 - 10.2 mg/dL    8.7   Protein, total      6.0 - 8.5 g/dL    6.3   Albumin      3.8 - 4.9 g/dL    3.8   GLOBULIN, TOTAL      1.5 - 4.5 g/dL    2.5   A-G Ratio      1.2 - 2.2    1.5   Bilirubin, total      0.0 - 1.2 mg/dL    0.2   Alk. phosphatase      44 - 121 IU/L    119   AST      0 - 40 IU/L    16   ALT      0 - 32 IU/L    10   Creatinine, urine      Not Estab. mg/dL   42.7    Microalbumin, urine      Not Estab. ug/mL   <3.0    Microalbumin/Creat.  Ratio      0 - 29 mg/g creat   <7    Hemoglobin A1c, (calculated)      4.8 - 5.6 %  10.8 (H)     Estimated average glucose      mg/dL  263     TSH      0.450 - 4.500 uIU/mL 4.110        Sent her the following message through [facility]:    Hemoglobin A1c is a 3 month marker of your diabetes control. Goal is less than 7%. Yours pilo from 9.4% to 10.8%. hopefully with getting over the infection, your next one will be better. Continue to work on your diet and exercise and take all your medications as directed.  -------------------------------------------------------------------------------------------------------------------  BUN and creatinine are markers of kidney function. Your creatinine was abnormal at 1.31 but down from 2.19 two months ago.  -------------------------------------------------------------------------------------------------------------------  ALT and AST are markers of liver function. Your values are normal.  -------------------------------------------------------------------------------------------------------------------  Microalbumin/creatinine ratio is a marker of the amount of protein in your urine. Goal is less than 30. Your value is normal. This indicates that your kidneys are not being affected by your uncontrolled diabetes and/or blood pressure.  -------------------------------------------------------------------------------------------------------------------  TSH is a thyroid test.  Your level is normal so you don't have any problem with your thyroid function at this time that needs further evaluation or treatment.

## 2022-09-14 LAB
ALBUMIN SERPL-MCNC: 3.8 G/DL (ref 3.8–4.9)
ALBUMIN/CREAT UR: <7 MG/G CREAT (ref 0–29)
ALBUMIN/GLOB SERPL: 1.5 {RATIO} (ref 1.2–2.2)
ALP SERPL-CCNC: 119 IU/L (ref 44–121)
ALT SERPL-CCNC: 10 IU/L (ref 0–32)
AST SERPL-CCNC: 16 IU/L (ref 0–40)
BILIRUB SERPL-MCNC: 0.2 MG/DL (ref 0–1.2)
BUN SERPL-MCNC: 21 MG/DL (ref 6–24)
BUN/CREAT SERPL: 16 (ref 9–23)
CALCIUM SERPL-MCNC: 8.7 MG/DL (ref 8.7–10.2)
CHLORIDE SERPL-SCNC: 94 MMOL/L (ref 96–106)
CO2 SERPL-SCNC: 22 MMOL/L (ref 20–29)
CREAT SERPL-MCNC: 1.31 MG/DL (ref 0.57–1)
CREAT UR-MCNC: 42.7 MG/DL
EGFR: 49 ML/MIN/1.73
EST. AVERAGE GLUCOSE BLD GHB EST-MCNC: 263 MG/DL
GLOBULIN SER CALC-MCNC: 2.5 G/DL (ref 1.5–4.5)
GLUCOSE SERPL-MCNC: 473 MG/DL (ref 65–99)
HBA1C MFR BLD: 10.8 % (ref 4.8–5.6)
INTERPRETATION: NORMAL
MICROALBUMIN UR-MCNC: <3 UG/ML
POTASSIUM SERPL-SCNC: 4.7 MMOL/L (ref 3.5–5.2)
PROT SERPL-MCNC: 6.3 G/DL (ref 6–8.5)
SODIUM SERPL-SCNC: 130 MMOL/L (ref 134–144)
TSH SERPL DL<=0.005 MIU/L-ACNC: 4.11 UIU/ML (ref 0.45–4.5)

## 2022-09-15 RX ORDER — CARVEDILOL 6.25 MG/1
TABLET ORAL
Qty: 90 TABLET | Refills: 3 | Status: SHIPPED | OUTPATIENT
Start: 2022-09-15

## 2022-09-19 ENCOUNTER — HOSPITAL ENCOUNTER (OUTPATIENT)
Dept: WOUND CARE | Age: 52
Discharge: HOME | End: 2022-09-19
Payer: MEDICARE

## 2022-09-19 VITALS
RESPIRATION RATE: 18 BRPM | TEMPERATURE: 97.8 F | DIASTOLIC BLOOD PRESSURE: 60 MMHG | HEART RATE: 85 BPM | SYSTOLIC BLOOD PRESSURE: 122 MMHG

## 2022-09-19 DIAGNOSIS — Z72.0 TOBACCO ABUSE: ICD-10-CM

## 2022-09-19 DIAGNOSIS — L97.911 NON-PRESSURE CHRONIC ULCER OF RIGHT LOWER LEG, LIMITED TO BREAKDOWN OF SKIN (HCC): Primary | ICD-10-CM

## 2022-09-19 DIAGNOSIS — R60.0 BILATERAL LEG EDEMA: ICD-10-CM

## 2022-09-19 DIAGNOSIS — L97.911 NON-PRESSURE CHRONIC ULCER OF RIGHT LOWER LEG, LIMITED TO BREAKDOWN OF SKIN (HCC): ICD-10-CM

## 2022-09-19 PROBLEM — L97.912 NON-PRESSURE CHRONIC ULCER OF RIGHT LOWER LEG WITH FAT LAYER EXPOSED (HCC): Status: ACTIVE | Noted: 2022-09-19

## 2022-09-19 PROCEDURE — 29581 APPL MULTLAYER CMPRN SYS LEG: CPT

## 2022-09-19 PROCEDURE — 99203 OFFICE O/P NEW LOW 30 MIN: CPT | Performed by: SURGERY

## 2022-09-19 PROCEDURE — 99213 OFFICE O/P EST LOW 20 MIN: CPT

## 2022-09-19 RX ORDER — LIDOCAINE HYDROCHLORIDE 20 MG/ML
JELLY TOPICAL ONCE
Status: CANCELLED | OUTPATIENT
Start: 2022-09-19 | End: 2022-09-19

## 2022-09-19 RX ORDER — LIDOCAINE 40 MG/G
CREAM TOPICAL ONCE
Status: CANCELLED | OUTPATIENT
Start: 2022-09-19 | End: 2022-09-19

## 2022-09-19 RX ORDER — BACITRACIN ZINC AND POLYMYXIN B SULFATE 500; 1000 [USP'U]/G; [USP'U]/G
OINTMENT TOPICAL ONCE
Status: CANCELLED | OUTPATIENT
Start: 2022-09-19 | End: 2022-09-19

## 2022-09-19 RX ORDER — SILVER SULFADIAZINE 10 G/1000G
CREAM TOPICAL ONCE
Status: CANCELLED | OUTPATIENT
Start: 2022-09-19 | End: 2022-09-19

## 2022-09-19 RX ORDER — BACITRACIN 500 [USP'U]/G
OINTMENT TOPICAL ONCE
Status: CANCELLED | OUTPATIENT
Start: 2022-09-19 | End: 2022-09-19

## 2022-09-19 RX ORDER — GENTAMICIN SULFATE 1 MG/G
OINTMENT TOPICAL ONCE
Status: CANCELLED | OUTPATIENT
Start: 2022-09-19 | End: 2022-09-19

## 2022-09-19 RX ORDER — LIDOCAINE HYDROCHLORIDE 40 MG/ML
SOLUTION TOPICAL ONCE
Status: CANCELLED | OUTPATIENT
Start: 2022-09-19 | End: 2022-09-19

## 2022-09-19 RX ORDER — MUPIROCIN 20 MG/G
OINTMENT TOPICAL ONCE
Status: CANCELLED | OUTPATIENT
Start: 2022-09-19 | End: 2022-09-19

## 2022-09-19 NOTE — WOUND CARE
09/19/22 1012   Right Leg Edema Point of Measurement   Leg circumference 46.5 cm   Ankle circumference 32 cm   Compression Therapy Other  (elevation and compression socks)   Left Leg Edema Point of Measurement   Leg circumference 45.6 cm   Ankle circumference 32 cm   Compression Therapy Other   LLE Peripheral Vascular    Capillary Refill Less than/equal to 3 seconds   Color Appropriate for race   Temperature Warm   Sensation Decreased   Pedal Pulse Doppler   RLE Peripheral Vascular    Capillary Refill Less than/equal to 3 seconds   Color Appropriate for race   Temperature Warm   Sensation Decreased   Pedal Pulse Doppler   Wound Leg lower Right;Medial   Date First Assessed/Time First Assessed: 09/19/22 1018   Wound Approximate Age at First Assessment (Weeks): 2 weeks  Primary Wound Type: Venous  Location: Leg lower  Wound Location Orientation: Right;Medial   Wound Image    Wound Etiology Venous  (Swelling patient stated she gain 40 lbs of fluid)   Dressing Status   (Open to air)   Cleansed Soap and water   Dressing/Treatment Open to air   Wound Length (cm) 5 cm  (scabbed over)   Wound Width (cm) 3 cm   Wound Depth (cm) 0.1 cm   Wound Surface Area (cm^2) 15 cm^2   Wound Volume (cm^3) 1.5 cm^3   Wound Assessment   (scabbed over)   Drainage Amount   (unable to assess wound was open to air.)   Wound Odor None   Fiona-Wound/Incision Assessment Edematous   Edges Attached edges  (scabbed over)   Wound Thickness Description   (Unable to assess scabbed over)   Pain 1   Pain Scale 1 Numeric (0 - 10)   Pain Intensity 1 6   Pain Onset 1 Chronic   Pain Location 1 Groin;Leg   Pain Orientation 1 Right   Pain Description 1 Aching   Pain Intervention(s) 1 Medication (see MAR)         Visit Vitals  /60   Pulse 85   Temp 97.8 °F (36.6 °C)   Resp 18

## 2022-09-19 NOTE — DISCHARGE INSTRUCTIONS
Discharge Instructions/Wound Orders  [address], [address] [place] [address] [place], [place]  Telephone: [phone]    NAME:  Shelly Ruffin OF BIRTH:  1970  MEDICAL RECORD NUMBER:  [id number]  DATE:  09/19/22  Wound Care Orders:  Right lower leg wound :Cleanse with Soap and water , apply primary dressing xeroform cover with secondary dressing   abd pad . Apply 2 layers with Calamine  Pt./pcg/HH nurse to change (freq) once a week in clinic and as needed for compromise. F/U in 1 week. Cast cover will be needed for showering to prevent getting the compression wrap wet. Called your Cardiologist about possible increase of dieretic due to the increase in swelling. Dietary:  [] Diet as tolerated: [] Diabetic Diet:Low carb and no Sugar   [] No Added Salt:[] Increase Protein:   [x] Other:Limit the amount of liquid you are drinking and avoid drinking in between meals   Activity:  [] Activity as tolerated:     Return Appointment:  [] Return Appointment: With DR Wagner [facility]  in  [address] [place] [address])  [] Ordered tests:   Electronically signed Ta Pires RN on 9/19/2022 at 10:29 AM   Kristi Cunha 281: Should you experience any significant changes in your wound(s) or have questions about your wound care, please contact the [address] [place] [address] at [address] [place] [address] 8:00 am - 4:30. If you need help with your wound outside these hours and cannot wait until we are again available, contact your PCP or go to the hospital emergency room. PLEASE NOTE: IF YOU ARE UNABLE TO OBTAIN WOUND SUPPLIES, CONTINUE TO USE THE SUPPLIES YOU HAVE AVAILABLE UNTIL YOU ARE ABLE TO REACH US. IT IS MOST IMPORTANT TO KEEP THE WOUND COVERED AT ALL TIMES.     Physician Signature:_______________________    Date: ___________ Time:  ____________

## 2022-09-19 NOTE — WOUND CARE
09/19/22 1040   Wound Leg lower Right;Medial   Date First Assessed/Time First Assessed: 09/19/22 1018   Wound Approximate Age at First Assessment (Weeks): 2 weeks  Primary Wound Type: Venous  Location: Leg lower  Wound Location Orientation: Right;Medial   Dressing Status New dressing applied   Dressing/Treatment Xeroform;ABD pad  (2 layer wrap with calamine)     Multilayer Compression Wrap   (Not Unna) Below the Knee    NAME:  Afshan Larsen OF BIRTH:  1970  MEDICAL RECORD NUMBER:  [id number]  DATE:  9/19/2022    Removed old Multilayer wrap if indicated and wash leg with mild soap/water. Applied moisturizing agent to dry skin as needed. Applied primary and secondary dressing as ordered. Applied multilayered dressing below the knee to right lower leg. Instructed patient/caregiver not to remove dressing and to keep it clean and dry. Instructed patient/caregiver on complications to report to provider, such as pain, numbness in toes, heavy drainage, and slippage of dressing. Instructed patient on purpose of compression dressing and on activity and exercise recommendations.     Response to treatment: Well tolerated by patient       Electronically signed by Reshma Summers RN on 9/19/2022 at 10:42 AM

## 2022-09-19 NOTE — H&P
[facility]  HISTORY AND PHYSICAL    Name:  Holly Beauchamp  MR#:  [id number]  :  1970  ACCOUNT #:  [de-identified]  ADMIT DATE:  2022    HISTORY OF PRESENT ILLNESS:  The patient is a 60-year-old woman who is referred to [address] [place] [address] regarding nonhealing wounds on the right lower extremity. The patient reports that she was discharged about a week ago from [facility]. She had been admitted with severe edema and had diuresis of many pounds of fluid. She had developed blisters on the right lower extremity. Ulcers  resulted, which have been improving with diuresis. The patient presently is taking bumetanide 2 mg twice per day and spironolactone 25 mg daily. The patient reports that her bumetanide dose produce a large diuresis. She says that she is complying with a total per day fluid intake limit of 60 ounces. The patient's diuretics are managed by her cardiologist, Dr. Devin Mcclelland. The patient reportedly sleeps in a recliner. The patient has diabetes mellitus, which has in the past been poorly controlled. She is followed by Dr. Arturo Fernandez as her endocrinologist.  She has had an insulin pump and glucose monitor and says that her recent hemoglobin A1c was approximately 8.5, which is a significant improvement. She does follow a diabetic diet. The patient does not describe anginal symptoms. She has no history of MI or coronary intervention. She reports that she had \"a hole in her heart\" which was treated by catheter-based technique successfully. The patient had COVID in 10/2021 and was on home oxygen for a time since then. She is presently no longer on home oxygen. She does have a pulmonologist.    Past medical history includes CKD III, anxiety and depression, hypertension, tobacco abuse, obesity, chronic pain, gastric ulcer, and gastroesophageal reflux. The patient is reported to smoke half pack per day.           Reported weight 231 pounds, height 5 feet 5 inches. PHYSICAL EXAMINATION:  GENERAL:  The patient is an alert woman in no acute distress. VITAL SIGNS:  Blood pressure 122/60, pulse 85, respirations 18, temperature 97.8. HEAD AND NECK:  No jaundice. LUNGS:  Clear bilaterally without rales, rhonchi, or wheezes. HEART:  Regular without murmur, gallop or rub. NEURO:  The patient is alert and oriented. She moves all extremities equally. Facial movement is symmetrical.  Speech is normal.    WOUND EXAMINATION:  Examination of the left lower extremity revealed 2+ dorsalis pedis pulse. There was 1+ pitting edema in the left lower leg and trace pitting edema in the lower third of the left thigh. There was no ulcer on the left. Examination of the right lower extremity revealed 2+ dorsalis pedis pulse. There was 1 to 2+ pitting edema on the right lower leg. There was trace to 1+ pitting edema in the lower third of the right thigh. There were multiple dry scabbed ulcers on the right lower leg. Medially, there was a cluster 5.0 x 3.0 x 0.1 cm in size. Laterally, there were scabbed areas. The patient appears to have lower extremity ulcers on the right secondary to edema. By history, edema and ulcers have improved significantly with diuresis. I recommended the patient discuss with her cardiologist the potential for an increase in her diuretic dosing. The patient will continue her current fluid restriction of 60 ounces per day. She will continue to follow with Dr. Caralyn Riedel regarding her diabetes. Dressing ordered:  Apply Xeroform over ulcers and two-layer compression wrap with calamine from base of toes to upper calf. The patient will need to use a cast cover for showering. The patient will follow up in the [address] [place] [address] in one week.           FINAL DIAGNOSES:  Nonpressure ulcer in right lower leg limited to skin breakdown, bilateral leg edema, diabetes mellitus poorly controlled, tobacco abuse.        MD DINESH [email or web address]  D:  09/19/2022 10:50  T:  09/19/2022 13:39  JOB #:  [id number]

## 2022-09-26 ENCOUNTER — HOSPITAL ENCOUNTER (OUTPATIENT)
Dept: WOUND CARE | Age: 52
Discharge: HOME | End: 2022-09-26
Payer: MEDICARE

## 2022-09-26 VITALS
RESPIRATION RATE: 18 BRPM | HEART RATE: 79 BPM | DIASTOLIC BLOOD PRESSURE: 63 MMHG | TEMPERATURE: 98.2 F | SYSTOLIC BLOOD PRESSURE: 130 MMHG

## 2022-09-26 DIAGNOSIS — L97.911 NON-PRESSURE CHRONIC ULCER OF RIGHT LOWER LEG, LIMITED TO BREAKDOWN OF SKIN (HCC): Primary | ICD-10-CM

## 2022-09-26 DIAGNOSIS — R60.0 BILATERAL LEG EDEMA: ICD-10-CM

## 2022-09-26 PROCEDURE — 99213 OFFICE O/P EST LOW 20 MIN: CPT | Performed by: SURGERY

## 2022-09-26 PROCEDURE — 29581 APPL MULTLAYER CMPRN SYS LEG: CPT

## 2022-09-26 RX ORDER — LIDOCAINE HYDROCHLORIDE 20 MG/ML
JELLY TOPICAL ONCE
Status: CANCELLED | OUTPATIENT
Start: 2022-09-26 | End: 2022-09-26

## 2022-09-26 RX ORDER — BACITRACIN ZINC AND POLYMYXIN B SULFATE 500; 1000 [USP'U]/G; [USP'U]/G
OINTMENT TOPICAL ONCE
Status: CANCELLED | OUTPATIENT
Start: 2022-09-26 | End: 2022-09-26

## 2022-09-26 RX ORDER — LIDOCAINE 40 MG/G
CREAM TOPICAL ONCE
Status: CANCELLED | OUTPATIENT
Start: 2022-09-26 | End: 2022-09-26

## 2022-09-26 RX ORDER — GENTAMICIN SULFATE 1 MG/G
OINTMENT TOPICAL ONCE
Status: CANCELLED | OUTPATIENT
Start: 2022-09-26 | End: 2022-09-26

## 2022-09-26 RX ORDER — BACITRACIN 500 [USP'U]/G
OINTMENT TOPICAL ONCE
Status: CANCELLED | OUTPATIENT
Start: 2022-09-26 | End: 2022-09-26

## 2022-09-26 RX ORDER — SILVER SULFADIAZINE 10 G/1000G
CREAM TOPICAL ONCE
Status: CANCELLED | OUTPATIENT
Start: 2022-09-26 | End: 2022-09-26

## 2022-09-26 RX ORDER — MUPIROCIN 20 MG/G
OINTMENT TOPICAL ONCE
Status: CANCELLED | OUTPATIENT
Start: 2022-09-26 | End: 2022-09-26

## 2022-09-26 RX ORDER — LIDOCAINE HYDROCHLORIDE 40 MG/ML
SOLUTION TOPICAL ONCE
Status: CANCELLED | OUTPATIENT
Start: 2022-09-26 | End: 2022-09-26

## 2022-09-26 NOTE — WOUND CARE
09/26/22 0907   Right Leg Edema Point of Measurement   Leg circumference 42 cm   Ankle circumference 28.2 cm   Compression Therapy 2 layer compression wrap   RLE Peripheral Vascular    Capillary Refill Less than/equal to 3 seconds   Color Appropriate for race   Temperature Warm   Sensation Decreased   Pedal Pulse Palpable   Wound Leg lower Right;Medial   Date First Assessed/Time First Assessed: 09/19/22 1018   Wound Approximate Age at First Assessment (Weeks): 2 weeks  Primary Wound Type: Venous  Location: Leg lower  Wound Location Orientation: Right;Medial   Wound Etiology Venous   Dressing Status Intact   Cleansed Soap and water   Dressing/Treatment   (removed)   Wound Length (cm) 2.9 cm   Wound Width (cm) 1.8 cm   Wound Depth (cm) 0.1 cm   Wound Surface Area (cm^2) 5.22 cm^2   Change in Wound Size % 65.2   Wound Volume (cm^3) 0.522 cm^3   Wound Healing % 65   Wound Assessment   (scab)   Drainage Amount None   Wound Odor None   Fiona-Wound/Incision Assessment Blanchable erythema   Edges Attached edges   Wound Thickness Description Full thickness   Visit Vitals  /63   Pulse 79   Temp 98.2 °F (36.8 °C)   Resp 18

## 2022-09-26 NOTE — PROGRESS NOTES
HISTORY OF PRESENT ILLNESS:  The patient is a 60-year-old woman who is referred to [address] [place] [address] regarding nonhealing wounds on the right lower extremity. The patient was first seen at the [address] [place] [address] on 9/19/2022. The patient reports that she was discharged about 9/12/2022 from [facility]. She had been admitted with severe edema and had diuresis of many pounds of fluid. She had developed blisters on the right lower extremity. Ulcers  resulted, which have been improving with diuresis. The patient presently was taking bumetanide 2 mg twice per day and spironolactone 25 mg daily. The patient reports that her bumetanide dose produce a large diuresis. She says that she is complying with a total per day fluid intake limit of 60 ounces. The patient's diuretics are managed by her cardiologist, Dr. Sandy Oconnor. He increased her spironolactone to 50 mg bid on 9/20/2022. The patient reportedly sleeps in a recliner. The patient has diabetes mellitus, which has in the past been poorly controlled. She is followed by Dr. Komal Faith as her endocrinologist.  She has had an insulin pump and glucose monitor and says that her recent hemoglobin A1c was approximately 8.5, which is a significant improvement. She does follow a diabetic diet. The patient does not describe anginal symptoms. She has no history of MI or coronary intervention. She reports that she had \"a hole in her heart\" which was treated by catheter-based technique successfully. The patient had COVID in 10/2021 and was on home oxygen for a time since then. She is presently no longer on home oxygen. She does have a pulmonologist.     Past medical history includes CKD III, anxiety and depression, hypertension, tobacco abuse, obesity, chronic pain, gastric ulcer, and gastroesophageal reflux. The patient is reported to smoke half pack per day. Reported weight 231 pounds, height 5 feet 5 inches. PHYSICAL EXAMINATION:    GENERAL:  The patient is an alert woman in no acute distress. She is sleepy but awakens. (Says she was up all night with there sick dog.)     WOUND EXAMINATION:  Examination of the left lower extremity revealed 2+ dorsalis pedis pulse. There was no edema in the left thigh. Trace edema in left lower leg. There was no ulcer on the left. Examination of the right lower extremity revealed 2+ dorsalis pedis pulse. There was trace to 1 + pitting edema on the right lower leg. There was no edema in the right thigh. There were multiple small dry scabbed ulcers on the right lower leg. Medially, there was a hard dry crust 2.9 x 1.8 x 0.1 cm in size. Laterally, there were scabbed areas. The patient appears to have lower extremity ulcers on the right secondary to edema. By history, edema and ulcers have improved significantly with diuresis. She has had increase in spironolactone dose. Ulcers all improved with compression wrap. The patient will continue her current fluid restriction of 60 ounces per day. She will continue to follow with Dr. Jam Troncoso regarding her diabetes. Dressing ordered:  Apply thin Duoderm over right medial crust and two-layer compression wrap with calamine from base of toes to upper calf. The patient will need to use a cast cover for showering. Bring elastic stockings. The patient will follow up in the [address] [place] [address] in one week. FINAL DIAGNOSES:  Nonpressure ulcer in right lower leg limited to skin breakdown, bilateral leg edema, diabetes mellitus poorly controlled, tobacco abuse.            Arianna Cleveland MD

## 2022-09-26 NOTE — WOUND CARE
Multilayer Compression Wrap   (Not Unna) Below the Knee    NAME:  Lena Serve OF BIRTH:  1970  MEDICAL RECORD NUMBER:  [id number]  DATE:  9/26/2022 09/26/22 1004   Right Leg Edema Point of Measurement   Compression Therapy 2 layer compression wrap   Wound Leg lower Right;Medial   Date First Assessed/Time First Assessed: 09/19/22 1018   Wound Approximate Age at First Assessment (Weeks): 2 weeks  Primary Wound Type: Venous  Location: Leg lower  Wound Location Orientation: Right;Medial   Dressing Status New dressing applied   Dressing/Treatment Hydrocolloid  (2 layers with calamine(thin duoderm))     Removed old Multilayer wrap if indicated and wash leg with mild soap/water. Applied moisturizing agent to dry skin as needed. Applied primary and secondary dressing as ordered. Applied multilayered dressing below the knee to right lower leg. Instructed patient/caregiver not to remove dressing and to keep it clean and dry. Instructed patient/caregiver on complications to report to provider, such as pain, numbness in toes, heavy drainage, and slippage of dressing. Instructed patient on purpose of compression dressing and on activity and exercise recommendations.       Electronically signed by Ta Moscoso RN on 9/26/2022 at 10:06 AM

## 2022-09-26 NOTE — DISCHARGE INSTRUCTIONS
Discharge Instructions [place] [address], [address] [place] [address] [place], [id number]  Telephone: [phone]    NAME:  Danielle Dickey OF BIRTH:  1970  MEDICAL RECORD NUMBER:  [id number]  DATE:  9/26/2022  WOUND CARE ORDERS:  Right lower leg :Cleanse with Soap and water , apply primary dressing thin Hydrocolloid cover with secondary dressing   gauze . Apply 2 layers with Calamine  Pt./pcg/HH nurse to change (freq) once a week in clinic and as needed for compromise. F/U in 1 week. TREATMENT ORDERS:    Elevate leg(s) above the level of the heart when sitting. Avoid prolonged standing in one place. Do no get dressing/wrap wet. Follow Diet as prescribed:   [x] Diet as tolerated: [] Calorie Diabetic Diet: Low carb and no Sugar [x] No Added Salt:          Return Appointment:  [x] Return Appointment: With DR Lola Melendez  in  [address] [place] [address])     Electronically signed Jacquie Lawrence RN on 9/26/2022 at 9:57 AM     Kristi Cunha 281: Should you experience any significant changes in your wound(s) or have questions about your wound care, please contact the [address] [place] [address] at [address] [place] [address] 8:00 am - 4:30. If you need help with your wound outside these hours and cannot wait until we are again available, contact your PCP or go to the hospital emergency room. PLEASE NOTE: IF YOU ARE UNABLE TO OBTAIN WOUND SUPPLIES, CONTINUE TO USE THE SUPPLIES YOU HAVE AVAILABLE UNTIL YOU ARE ABLE TO REACH US. IT IS MOST IMPORTANT TO KEEP THE WOUND COVERED AT ALL TIMES.      Physician Signature:_______________________    Date: ___________ Time:  ____________

## 2022-10-03 ENCOUNTER — HOSPITAL ENCOUNTER (OUTPATIENT)
Dept: WOUND CARE | Age: 52
Discharge: HOME | End: 2022-10-03
Payer: MEDICARE

## 2022-10-03 VITALS
RESPIRATION RATE: 18 BRPM | SYSTOLIC BLOOD PRESSURE: 128 MMHG | TEMPERATURE: 97.8 F | DIASTOLIC BLOOD PRESSURE: 61 MMHG | HEART RATE: 87 BPM

## 2022-10-03 DIAGNOSIS — L97.911 NON-PRESSURE CHRONIC ULCER OF RIGHT LOWER LEG, LIMITED TO BREAKDOWN OF SKIN (HCC): Primary | ICD-10-CM

## 2022-10-03 PROCEDURE — 99213 OFFICE O/P EST LOW 20 MIN: CPT | Performed by: SURGERY

## 2022-10-03 PROCEDURE — 99212 OFFICE O/P EST SF 10 MIN: CPT

## 2022-10-03 RX ORDER — LIDOCAINE 40 MG/G
CREAM TOPICAL ONCE
Status: CANCELLED | OUTPATIENT
Start: 2022-10-03 | End: 2022-10-03

## 2022-10-03 RX ORDER — SILVER SULFADIAZINE 10 G/1000G
CREAM TOPICAL ONCE
Status: CANCELLED | OUTPATIENT
Start: 2022-10-03 | End: 2022-10-03

## 2022-10-03 RX ORDER — BACITRACIN 500 [USP'U]/G
OINTMENT TOPICAL ONCE
Status: CANCELLED | OUTPATIENT
Start: 2022-10-03 | End: 2022-10-03

## 2022-10-03 RX ORDER — LIDOCAINE HYDROCHLORIDE 20 MG/ML
JELLY TOPICAL ONCE
Status: CANCELLED | OUTPATIENT
Start: 2022-10-03 | End: 2022-10-03

## 2022-10-03 RX ORDER — LIDOCAINE HYDROCHLORIDE 40 MG/ML
SOLUTION TOPICAL ONCE
Status: CANCELLED | OUTPATIENT
Start: 2022-10-03 | End: 2022-10-03

## 2022-10-03 RX ORDER — BACITRACIN ZINC AND POLYMYXIN B SULFATE 500; 1000 [USP'U]/G; [USP'U]/G
OINTMENT TOPICAL ONCE
Status: CANCELLED | OUTPATIENT
Start: 2022-10-03 | End: 2022-10-03

## 2022-10-03 RX ORDER — MUPIROCIN 20 MG/G
OINTMENT TOPICAL ONCE
Status: CANCELLED | OUTPATIENT
Start: 2022-10-03 | End: 2022-10-03

## 2022-10-03 RX ORDER — GENTAMICIN SULFATE 1 MG/G
OINTMENT TOPICAL ONCE
Status: CANCELLED | OUTPATIENT
Start: 2022-10-03 | End: 2022-10-03

## 2022-10-03 NOTE — DISCHARGE INSTRUCTIONS
Discharge Instructions [address], [address] [facility] [place], [id number]  Telephone: [phone]    NAME:  Billie New OF BIRTH:  1970  MEDICAL RECORD NUMBER:  [id number]  DATE:  10/3/2022  WOUND CARE ORDERS:      Wound has healed. Moisture dry, intact skin with vaseline (petroleum jelly) and apply patient's compression stocking or Farrow wrap  Patient instructed to wear compression stockings (knee high, Strength as prescribed by MD) every day, applying first thing, every morning & removing at bedtime. No follow up appointment needed. TREATMENT ORDERS:    Elevate leg(s) above the level of the heart when sitting. Avoid prolonged standing in one place. Do no get dressing/wrap wet. Follow Diet as prescribed:   [] Diet as tolerated: [] Calorie Diabetic Diet: Low carb and no Sugar [] No Added Salt:  [] Increase Protein: [] Limit the amount of liquid you are drinking and avoid drinking in between meals           Return Appointment:  [] Return Appointment:  no follow up appointment needed  [] Nurse Visit :    [] Ordered tests:    Electronically signed Jag Anaya RN on 10/3/2022 at 8:58 AM     Kristi Cunha 281: Should you experience any significant changes in your wound(s) or have questions about your wound care, please contact the [address] [place] [address] at [address] [place] [address] 8:00 am - 4:30. If you need help with your wound outside these hours and cannot wait until we are again available, contact your PCP or go to the hospital emergency room. PLEASE NOTE: IF YOU ARE UNABLE TO OBTAIN WOUND SUPPLIES, CONTINUE TO USE THE SUPPLIES YOU HAVE AVAILABLE UNTIL YOU ARE ABLE TO REACH US. IT IS MOST IMPORTANT TO KEEP THE WOUND COVERED AT ALL TIMES.      Physician Signature:_______________________    Date: ___________ Time:  ____________   Low Sodium Diet (2,000 Milligram): Care Instructions  Overview     Limiting sodium can be an important part of managing some health problems. The most common source of sodium is salt. People get most of the salt in their diet from canned, prepared, and packaged foods. Fast food and restaurant meals also are very high in sodium. Your doctor will probably limit your sodium to less than 2,000 milligrams (mg) a day. This limit counts all the sodium in prepared and packaged foods and any salt you add to your food. Follow-up care is a key part of your treatment and safety. Be sure to make and go to all appointments, and call your doctor if you are having problems. It's also a good idea to know your test results and keep a list of the medicines you take. How can you care for yourself at home? Read food labels  Read labels on cans and food packages. The labels tell you how much sodium is in each serving. Make sure that you look at the serving size. If you eat more than the serving size, you have eaten more sodium. Food labels also tell you the Percent Daily Value for sodium. Choose products with low Percent Daily Values for sodium. Be aware that sodium can come in forms other than salt, including monosodium glutamate (MSG), sodium citrate, and sodium bicarbonate (baking soda). MSG is often added to Asian food. When you eat out, you can sometimes ask for food without MSG or added salt. Buy low-sodium foods  Buy foods that are labeled \"unsalted\" (no salt added), \"sodium-free\" (less than 5 mg of sodium per serving), or \"low-sodium\" (140 mg or less of sodium per serving). Foods labeled \"reduced-sodium\" and \"light sodium\" may still have too much sodium. Be sure to read the label to see how much sodium you are getting. Buy fresh vegetables, or frozen vegetables without added sauces. Buy low-sodium versions of canned vegetables, soups, and other canned goods. Prepare low-sodium meals  Cut back on the amount of salt you use in cooking. This will help you adjust to the taste.  Do not add salt after cooking. One teaspoon of salt has about 2,300 mg of sodium. Take the salt shaker off the table. Flavor your food with garlic, lemon juice, onion, vinegar, herbs, and spices. Do not use soy sauce, lite soy sauce, steak sauce, onion salt, garlic salt, celery salt, or ketchup on your food. Use low-sodium salad dressings, sauces, and ketchup. Or make your own salad dressings and sauces without adding salt. Use less salt (or none) when recipes call for it. You can often use half the salt a recipe calls for without losing flavor. Other foods such as rice, pasta, and grains do not need added salt. Rinse canned vegetables, and cook them in fresh water. This removes some--but not all--of the salt. Avoid water that is naturally high in sodium or that has been treated with water softeners, which add sodium. If you buy bottled water, read the label and choose a sodium-free brand. Avoid high-sodium foods  Avoid eating:  Smoked, cured, salted, and canned meat, fish, and poultry. Ham, godoy, hot dogs, and luncheon meats. Regular, hard, and processed cheese and regular peanut butter. Crackers with salted tops, and other salted snack foods such as pretzels, chips, and salted popcorn. Frozen prepared meals, unless labeled low-sodium. Canned and dried soups, broths, and bouillon, unless labeled sodium-free or low-sodium. Canned vegetables, unless labeled sodium-free or low-sodium. [place] fries, pizza, tacos, and other fast foods. Pickles, olives, ketchup, and other condiments, especially soy sauce, unless labeled sodium-free or low-sodium. Where can you learn more? Go to http://www.gray.com/  Enter V843 in the search box to learn more about \"Low Sodium Diet (2,000 Milligram): Care Instructions. \"  Current as of: September 8, 2021               Content Version: 13.2  © 5181-2521 [employer].    Care instructions adapted under license by [employer] (which disclaims liability or warranty for this information). If you have questions about a medical condition or this instruction, always ask your healthcare professional. [place] [address] any warranty or liability for your use of this information. Leg and Ankle Edema: Care Instructions  Your Care Instructions  Swelling in the legs, ankles, and feet is called edema. It is common after you sit or stand for a while. Long plane flights or car rides often cause swelling in the legs and feet. You may also have swelling if you have to stand for long periods of time at your job. Problems with the veins in the legs (varicose veins) and changes in hormones can also cause swelling. Sometimes the swelling in the ankles and feet is caused by a more serious problem, such as heart failure, infection, blood clots, or liver or kidney disease. Follow-up care is a key part of your treatment and safety. Be sure to make and go to all appointments, and call your doctor if you are having problems. It's also a good idea to know your test results and keep a list of the medicines you take. How can you care for yourself at home? If your doctor gave you medicine, take it as prescribed. Call your doctor if you think you are having a problem with your medicine. Whenever you are resting, raise your legs up. Try to keep the swollen area higher than the level of your heart. Take breaks from standing or sitting in one position. Walk around to increase the blood flow in your lower legs. Move your feet and ankles often while you stand, or tighten and relax your leg muscles. Wear support stockings. Put them on in the morning, before swelling gets worse. Eat a balanced diet. Lose weight if you need to. Limit the amount of salt (sodium) in your diet. Salt holds fluid in the body and may increase swelling. When should you call for help? Call 911 anytime you think you may need emergency care.  For example, call if:    You have symptoms of a blood clot in your lung (called a pulmonary embolism). These may include:  Sudden chest pain. Trouble breathing. Coughing up blood. Call your doctor now or seek immediate medical care if:    You have signs of a blood clot, such as:  Pain in your calf, back of the knee, thigh, or groin. Redness and swelling in your leg or groin. You have symptoms of infection, such as: Increased pain, swelling, warmth, or redness. Red streaks or pus. A fever. Watch closely for changes in your health, and be sure to contact your doctor if:    Your swelling is getting worse. You have new or worsening pain in your legs. You do not get better as expected. Where can you learn more? Go to http://www.gray.com/  Enter W153 in the search box to learn more about \"Leg and Ankle Edema: Care Instructions. \"  Current as of: July 1, 2021               Content Version: 13.2  © 2006-2022 [employer]. Care instructions adapted under license by [employer] (which disclaims liability or warranty for this information). If you have questions about a medical condition or this instruction, always ask your healthcare professional. [place] [address] any warranty or liability for your use of this information.

## 2022-10-03 NOTE — PROGRESS NOTES
HISTORY OF PRESENT ILLNESS:  The patient is a 46-year-old woman who is referred to [address] [place] [address] regarding nonhealing wounds on the right lower extremity. The patient was first seen at the [address] [place] [address] on 9/19/2022. The patient reports that she was discharged about 9/12/2022 from [facility]. She had been admitted with severe edema and had diuresis of many pounds of fluid. She had developed blisters on the right lower extremity. Ulcers  resulted, which have been improving with diuresis. The patient presently was taking bumetanide 2 mg twice per day and spironolactone 25 mg daily. The patient reports that her bumetanide dose produce a large diuresis. She says that she is complying with a total per day fluid intake limit of 60 ounces. The patient's diuretics are managed by her cardiologist, Dr. Bhargav Burgos. He increased her spironolactone to 50 mg bid on 9/20/2022. The patient reportedly sleeps in a recliner. The patient has diabetes mellitus, which has in the past been poorly controlled. She is followed by Dr. Benjie Noonan as her endocrinologist.  She has had an insulin pump and glucose monitor and says that her recent hemoglobin A1c was approximately 8.5, which is a significant improvement. She does follow a diabetic diet. The patient does not describe anginal symptoms. She has no history of MI or coronary intervention. She reports that she had \"a hole in her heart\" which was treated by catheter-based technique successfully. The patient had COVID in 10/2021 and was on home oxygen for a time since then. She is presently no longer on home oxygen. She does have a pulmonologist.     Past medical history includes CKD III, anxiety and depression, hypertension, tobacco abuse, obesity, chronic pain, gastric ulcer, and gastroesophageal reflux. The patient is reported to smoke half pack per day.       Dressing:  Apply thin Duoderm over right medial crust and two-layer compression wrap with calamine from base of toes to upper calf. Reported weight 231 pounds, height 5 feet 5 inches. PHYSICAL EXAMINATION:     GENERAL:  The patient is an alert woman in no acute distress. WOUND EXAMINATION:  Examination of the left lower extremity revealed 2+ dorsalis pedis pulse. There was no edema in the left thigh. Trace edema in left lower leg. There was no ulcer on the left. Examination of the right lower extremity revealed 2+ dorsalis pedis pulse. There was trace to 1 + pitting edema on the right lower leg. There was no edema in the right thigh. Right leg sites all healed. Ulcers healed. The patient will continue her current fluid restriction of 60 ounces per day. She will continue to follow with Dr. Lara Carrillo regarding her diabetes. Use elastic stockings when out of bed. No follow up needed. FINAL DIAGNOSES:  Nonpressure ulcer in right lower leg limited to skin breakdown (healed), bilateral leg edema, diabetes mellitus poorly controlled, tobacco abuse.            Natali Tapia MD

## 2022-10-03 NOTE — WOUND CARE
10/03/22 0842   Wound Leg lower Right;Medial   Date First Assessed/Time First Assessed: 09/19/22 1018   Wound Approximate Age at First Assessment (Weeks): 2 weeks  Primary Wound Type: Venous  Location: Leg lower  Wound Location Orientation: Right;Medial   Wound Image    Wound Etiology Venous   Dressing Status   (open to air)   Cleansed Cleansed with saline   Wound Length (cm) 0 cm   Wound Width (cm) 0 cm   Wound Depth (cm) 0 cm   Wound Surface Area (cm^2) 0 cm^2   Change in Wound Size % 100   Wound Volume (cm^3) 0 cm^3   Wound Healing % 100   Drainage Amount None   Visit Vitals  /61 (BP 1 Location: Left upper arm, BP Patient Position: At rest)   Temp 97.8 °F (36.6 °C)   Resp 18   HR 87

## 2022-10-26 RX ORDER — INSULIN LISPRO 100 [IU]/ML
INJECTION, SOLUTION INTRAVENOUS; SUBCUTANEOUS
Qty: 90 ML | Refills: 3 | Status: SHIPPED | OUTPATIENT
Start: 2022-10-26

## 2022-11-28 DIAGNOSIS — I10 ESSENTIAL HYPERTENSION: ICD-10-CM

## 2022-11-28 DIAGNOSIS — E78.5 HYPERLIPIDEMIA LDL GOAL <100: ICD-10-CM

## 2022-11-28 DIAGNOSIS — R94.6 BORDERLINE ABNORMAL THYROID FUNCTION TEST: ICD-10-CM

## 2022-11-28 DIAGNOSIS — E10.21 TYPE 1 DIABETES MELLITUS WITH NEPHROPATHY (HCC): ICD-10-CM

## 2022-11-28 DIAGNOSIS — R79.89 ELEVATED LFTS: ICD-10-CM

## 2022-12-06 ENCOUNTER — TELEPHONE (OUTPATIENT)
Dept: ENDOCRINOLOGY | Age: 52
End: 2022-12-06

## 2022-12-06 NOTE — TELEPHONE ENCOUNTER
12/6/2022  10:57 AM    Pt called and left message at 10.53 stating that she would like a call back from dr Araseli Manzano.   Please call her back at [phone]

## 2022-12-06 NOTE — TELEPHONE ENCOUNTER
Pt called wanting to know date of next appt. Informed pt that next appt is on 12/13/2022 @ 9:30AM. Pt verbalized understanding and stated she didn't need a call from Dr Danyel Peralta.

## 2022-12-12 ENCOUNTER — OFFICE VISIT (OUTPATIENT)
Dept: ENDOCRINOLOGY | Age: 52
End: 2022-12-12
Payer: MEDICARE

## 2022-12-12 VITALS
HEART RATE: 96 BPM | HEIGHT: 65 IN | DIASTOLIC BLOOD PRESSURE: 73 MMHG | WEIGHT: 226 LBS | BODY MASS INDEX: 37.65 KG/M2 | SYSTOLIC BLOOD PRESSURE: 117 MMHG

## 2022-12-12 DIAGNOSIS — I10 ESSENTIAL HYPERTENSION: ICD-10-CM

## 2022-12-12 DIAGNOSIS — E78.5 HYPERLIPIDEMIA LDL GOAL <100: ICD-10-CM

## 2022-12-12 DIAGNOSIS — R94.6 BORDERLINE ABNORMAL THYROID FUNCTION TEST: ICD-10-CM

## 2022-12-12 DIAGNOSIS — R79.89 ELEVATED LFTS: ICD-10-CM

## 2022-12-12 DIAGNOSIS — E10.21 TYPE 1 DIABETES MELLITUS WITH NEPHROPATHY (HCC): Primary | ICD-10-CM

## 2022-12-12 LAB — HBA1C MFR BLD HPLC: 10.7 %

## 2022-12-12 PROCEDURE — 3074F SYST BP LT 130 MM HG: CPT | Performed by: INTERNAL MEDICINE

## 2022-12-12 PROCEDURE — 3046F HEMOGLOBIN A1C LEVEL >9.0%: CPT | Performed by: INTERNAL MEDICINE

## 2022-12-12 PROCEDURE — G9717 DOC PT DX DEP/BP F/U NT REQ: HCPCS | Performed by: INTERNAL MEDICINE

## 2022-12-12 PROCEDURE — 3017F COLORECTAL CA SCREEN DOC REV: CPT | Performed by: INTERNAL MEDICINE

## 2022-12-12 PROCEDURE — 83036 HEMOGLOBIN GLYCOSYLATED A1C: CPT | Performed by: INTERNAL MEDICINE

## 2022-12-12 PROCEDURE — 99214 OFFICE O/P EST MOD 30 MIN: CPT | Performed by: INTERNAL MEDICINE

## 2022-12-12 PROCEDURE — G8427 DOCREV CUR MEDS BY ELIG CLIN: HCPCS | Performed by: INTERNAL MEDICINE

## 2022-12-12 PROCEDURE — 3078F DIAST BP <80 MM HG: CPT | Performed by: INTERNAL MEDICINE

## 2022-12-12 PROCEDURE — G8754 DIAS BP LESS 90: HCPCS | Performed by: INTERNAL MEDICINE

## 2022-12-12 PROCEDURE — G8752 SYS BP LESS 140: HCPCS | Performed by: INTERNAL MEDICINE

## 2022-12-12 PROCEDURE — G8417 CALC BMI ABV UP PARAM F/U: HCPCS | Performed by: INTERNAL MEDICINE

## 2022-12-12 PROCEDURE — 2022F DILAT RTA XM EVC RTNOPTHY: CPT | Performed by: INTERNAL MEDICINE

## 2022-12-12 RX ORDER — METOCLOPRAMIDE 5 MG/1
5 TABLET ORAL 2 TIMES DAILY
Qty: 60 TABLET | Refills: 11 | Status: SHIPPED | OUTPATIENT
Start: 2022-12-12

## 2022-12-12 RX ORDER — PHENOL/SODIUM PHENOLATE
20 AEROSOL, SPRAY (ML) MUCOUS MEMBRANE DAILY
COMMUNITY

## 2022-12-12 NOTE — PROGRESS NOTES
Chief Complaint   Patient presents with    Diabetes     History of Present Illness: Malachi Cardozo is a 47 y.o. female here for follow up of diabetes. Current settings are as follows:  - basal: 12a: 1.4  - Carb ratio: 12a: 10, 11a: 10. 5p: 10  - sensitivity: 28  - target: 12a: 130-150  - active insulin time: 3 hours    She had another admission to [facility] last month for cellulitis and needed steroids and this shot her sugar up. Has been under more stress. Review of her sensor data on her pump over the past 30 days shows that she has days where she can be between 100-180 and other times where she can spike over 200-300s after meals when not bolusing properly. No trends to warrant changes. She did find the reglan helped her n/v but ran out of this and didn't refill it so sent more to the pharmacy today. Current Outpatient Medications   Medication Sig    Omeprazole delayed release (PRILOSEC D/R) 20 mg tablet Take 20 mg by mouth daily. HumaLOG U-100 Insulin 100 unit/mL injection USE AS DIRECTED FOR INSULIN PUMP.  UNITS PER DAY. DX E10.65--replaces novolog    carvediloL (COREG) 6.25 mg tablet TAKE ONE TABLET BY MOUTH DAILY    cefpodoxime (VANTIN) 100 mg tablet daily. glucagon (Glucagon Emergency Kit, human,) 1 mg solr USE AS DIRECTED IN KIT INSTRUCTIONS    gabapentin (NEURONTIN) 600 mg tablet Take 1 Tablet by mouth four (4) times daily. Max Daily Amount: 2,400 mg. Replaces the 300 mg caps    buPROPion SR (WELLBUTRIN SR) 100 mg SR tablet daily. spironolactone (ALDACTONE) 25 mg tablet 50 mg daily. naloxone (Narcan) 4 mg/actuation nasal spray Use 1 spray intranasally, then discard. Repeat with new spray every 2 min as needed for opioid overdose symptoms, alternating nostrils. BD Insulin Syringe Ultra-Fine 0.5 mL 31 gauge x 5/16\" syrg USE ONE SYRINGE - FOUR TIMES A DAY    mirtazapine (REMERON) 7.5 mg tablet Take 15 mg by mouth daily.     morphine CR (MS CONTIN) 100 mg CR tablet Take 1 Tablet by mouth two (2) times a day. aspirin delayed-release 81 mg tablet Take 81 mg by mouth daily. diclofenac (VOLTAREN) 1 % gel Apply 2 g to affected area four (4) times daily as needed for Pain. RT KNEE    oxyCODONE IR (ROXICODONE) 5 mg immediate release tablet Take 5 mg by mouth every four (4) hours as needed for Pain. rosuvastatin (CRESTOR) 40 mg tablet Take 40 mg by mouth daily. brexpiprazole (REXULTI) 0.5 mg tab tablet Take 1 mg by mouth two (2) times a day. busPIRone (BUSPAR) 10 mg tablet Take 20 mg by mouth two (2) times a day. ALPRAZolam (XANAX) 1 mg tablet Take 1 mg by mouth three (3) times daily as needed. 1.5 tabs TID as needed    fluticasone propionate (FLONASE) 50 mcg/actuation nasal spray 2 Sprays by Both Nostrils route daily as needed for Rhinitis. bumetanide (BUMEX) 2 mg tablet Take 2 mg by mouth two (2) times a day. esomeprazole (NEXIUM) 40 mg capsule TAKE ONE CAPSULE BY MOUTH DAILY (Patient taking differently: 80 mg daily.)    insulin pump (PATIENT SUPPLIED) misc by SubCUTAneous route as needed. levonorgestreL (MIRENA) 20 mcg/24 hours (7 yrs) 52 mg IUD 1 Device by IntraUTERine route once. metoclopramide HCl (REGLAN) 5 mg tablet Take 1 Tablet by mouth two (2) times a day. (Patient not taking: No sig reported)    Accu-Chek Fastclix Lancet Drum misc Use to test up to 8 times per day. DX Code: 10.65--delete prescription for softclix sent in error (Patient not taking: Reported on 12/12/2022)    Accu-Chek Guide test strips strip Use as directed to test up to 8 times per day. Dx E10.65 (Patient not taking: Reported on 12/12/2022)     No current facility-administered medications for this visit.      Allergies   Allergen Reactions    Metolazone Other (comments)     Caused hyponatremia    Zocor [Simvastatin] Myalgia    Lisinopril Cough    Medrol [Methylprednisolone] Other (comments)     Caused severe hyperglycemia with the medrol pack     Review of Systems: PER HPI    Physical Examination:  Blood pressure 117/73, pulse 96, height 5' 5\" (1.651 m), weight 226 lb (102.5 kg). 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): Most recent Hgb A1c was 10.7% in 12/22 down from 10.8% in 9/22 up from 9.4% in 6/22 down from 11.4% in 3/22 up from 9.5% in 11/21 up from 9.1% in 7/21 down from 9.6% in 4/21 stable from 1/21 up from 9.4% in 7/20 down from 9.5% in 12/19 down from 10.6% in 9/19 up from 9.3% in 7/19 down from 10.1% in 5/19 up from 10% in 1/19 up from 9.2% in 10/18 up from 9% in 7/18 up from 8.2% in 4/18 down from 8.8% in 11/17 up from 7.4% in 9/17 down from 9.4% in 2/17 up from 9.1% in 11/16 down from 10% in 3/16 up from 9.4% in 12/15 up from 9.2% in 9/15 down from 9.7% in 5/15 down from 10.3% in 1/15 up from 8.9% in 10/14 down from 11.1% in 4/14 up from 9.2% in 1/14 up from 8.6% in March 2013 down from 8.9% in November down from 9.7% in Aug 2012 up from 9.2% in October 2011 up from 8.7% in May down from 9.7% in March up from 9.1% in July 2010 down from 10.2% in January down from 13.7% in October 2009. Want her A1c under 8% to have hysterectomy. No trends to warrant changes.   - cont current pump settings   - check bs 8 times daily due to fluctuating blood sugars  - foot exam done 6/22  - microalbumin was 69 in 7/10 up to 392 in March 2011 and 621 in May down to 216 in October 2011 and 37.3 in 4/14 and up to 183 in 12/15 and 300 in 9/16, down to 85 in 4/18 and 10.2 in 9/19 and normal ever since, last in 11/21  - optho UTD 1/21  - check A1c and cmp prior to next visit        2) HTN NOS (401.9): Blood pressure was at goal < 140/90   - cont current regimen  - just take bumex 2 mg 3x/week to avoid dehydration  - check bmp today      3) Hyperlipidemia (272.4): Given DM, goal LDL is < 100, non-HDL < 130, and TGs < 150. LDL was 146 in 2010, down to 124 in July and 93 in 2011 with taking 5 mg of crestor daily. Her crestor was changed to pravastatin by Dr. Romelle Apgar because of cost in 2012. LDL 93 in  but her HDL was high at 138 due to ETOH intake. LDL down to 39 in  but up to 102 in 3/13. Off pravastatin at this time and previously was on 10 mg daily.  in . Up to 167 in  so started lovastatin 20 mg daily but she couldn't afford this. She has been started on crestor 40 mg daily by crossover clinic and LDL 64 in 10/14 and 55 in 1/15. Was 37 in 3/16 so dose decreased to 20 mg daily and LDL 23 in . Was changed to lipitor  of 80 mg daily when clinic couldn't get crestor any longer and LDL 37 in . This was stopped during her hospital stay in  but was restarted by Dr. Rojas Miranda in  and 42 in . Up to 94 in . Down to 79 in  and 88 in  and 78 in  and 61 in  and 54 in . Changed to crestor 40 mg sometime in the spring of  and LDL 86 in  and 54 in 3/22 and 52 in   - cont crestor 40 mg daily  - check lipids in       4) Elevated LFTs (790.6): I told her previously that her LFTs were elevated likely due to ETOH intake so I advised her to cut back on this and her repeat LFTs were normal in 3/13 and  and  and 10/14 and 1/15 and 12/15 and 3/16. AST up to 76 in  but back to normal in  and has been normal ever since so will follow this. 5) Borderline abnormal TFT: TSH 2.1 in 12/11 but up to 4.01 in  and 4.75 in  during 2 hospital stays. Up to 5.45 in  with Dr. Rojas Miranda. Repeat TSH 1.24 and FT4 0.95 and TPO ab 21 in  so held on any treatment.    TSH up to 3.36 in  but down to 2.61 in 12/19 and 1.59 in 7/19 and 1.48 in 7/21 and 2.88 in 11/21. Up to 4.1 in 9/22.  - check TSH, free T4 and Thyroid antibody panel today      Patient Instructions   1) Please call [phone] to reset your [employer] password. 2) Your Hemoglobin A1c (3 month test of blood sugar) was 10.8% up from 10.7% likely due to spikes over 200 from stress and not always bolusing correctly but your overall setting still look good.         Follow-up and Dispositions    Return 3/14/23 at 9:10am.               Copy sent to:  Dr. Jaron Lock follow up: 12/16/22    Sent her the following message through [address] and in a letter:    METABOLIC PANEL, BASIC   Result Value Ref Range    Glucose 212 (H) 70 - 99 mg/dL    BUN 19 6 - 24 mg/dL    Creatinine 1.45 (H) 0.57 - 1.00 mg/dL    eGFR 44 (L) >59 mL/min/1.73    BUN/Creatinine ratio 13 9 - 23    Sodium 140 134 - 144 mmol/L    Potassium 4.5 3.5 - 5.2 mmol/L    Chloride 97 96 - 106 mmol/L    CO2 27 20 - 29 mmol/L    Calcium 9.2 8.7 - 10.2 mg/dL    Narrative    Performed at:  [facility]  [address]  [id number]  : Maris Juan MD, Phone:  [phone]   CKD REPORT   Result Value Ref Range    Interpretation Note     Narrative    Performed at:  [place], [place]  [id number]  : Raman Aviles PhD, Phone:  [phone]   T4, FREE   Result Value Ref Range    T4, Free 1.02 0.82 - 1.77 ng/dL    Narrative    Performed at:  [address] [employer]  [id number]  : Maris Juan MD, Phone:  [phone]   TSH 3RD GENERATION   Result Value Ref Range    TSH 2.970 0.450 - 4.500 uIU/mL    Narrative    Performed at:  [facility] [address]  [id number]  : Maris Juan MD, Phone:  [phone]   THYROID ANTIBODY PANEL   Result Value Ref Range    Thyroid peroxidase Ab <9 0 - 34 IU/mL    Thyroglobulin Ab <1.0 0.0 - 0.9 IU/mL    Narrative    Performed at:  [facility] [employer]  [address]  [id number]  : Sebastián Agustin MD, Phone:  [phone]       BUN and creatinine are markers of kidney function. Your creatinine is elevated at 1.45 but stable in the 1.3-2.0 range over the past year.  -------------------------------------------------------------------------------------------------------------------  TSH is a thyroid test.  Your level is normal so you don't have any problem with your thyroid function at this time that needs further evaluation or treatment so we'll continue to follow this over time.   -------------------------------------------------------------------------------------------------------------------  Your thyroid peroxidase (TPO) antibody and thyroglobulin antibody levels were also normal which indicates that you do not have an autoimmune thyroid disease called Hashimoto's disease

## 2022-12-12 NOTE — PATIENT INSTRUCTIONS
1) Please call [phone] to reset your [employer] password. 2) Your Hemoglobin A1c (3 month test of blood sugar) was 10.8% up from 10.7% likely due to spikes over 200 from stress and not always bolusing correctly but your overall setting still look good.

## 2022-12-14 LAB
BUN SERPL-MCNC: 19 MG/DL (ref 6–24)
BUN/CREAT SERPL: 13 (ref 9–23)
CALCIUM SERPL-MCNC: 9.2 MG/DL (ref 8.7–10.2)
CHLORIDE SERPL-SCNC: 97 MMOL/L (ref 96–106)
CO2 SERPL-SCNC: 27 MMOL/L (ref 20–29)
CREAT SERPL-MCNC: 1.45 MG/DL (ref 0.57–1)
EGFR: 44 ML/MIN/1.73
GLUCOSE SERPL-MCNC: 212 MG/DL (ref 70–99)
INTERPRETATION: NORMAL
POTASSIUM SERPL-SCNC: 4.5 MMOL/L (ref 3.5–5.2)
SODIUM SERPL-SCNC: 140 MMOL/L (ref 134–144)
T4 FREE SERPL-MCNC: 1.02 NG/DL (ref 0.82–1.77)
THYROGLOB AB SERPL-ACNC: <1 IU/ML (ref 0–0.9)
THYROPEROXIDASE AB SERPL-ACNC: <9 IU/ML (ref 0–34)
TSH SERPL DL<=0.005 MIU/L-ACNC: 2.97 UIU/ML (ref 0.45–4.5)

## 2022-12-21 NOTE — PROGRESS NOTES
Endocrinology Progress Note    Came by to see patient this morning and her swelling in her face, lips, and legs is much improved. She is not having any difficulty swallowing or breathing. She received her last dose of IV solumedrol last night and despite getting 40 units of NPH, her sugar climbed to 322 at 4am and I gave her a one time dose of 8 units of humalog and it was down to 270 this morning. She is feeling well and ready for discharge. She has her home insulin pump with her and it has insulin in the reservoir. She currently has a site in place that she placed 2 days ago that hasn't been used so I reconnected her pump and she bolused a correction dose of 7.8 units in my presence. I contacted her nurse, Timothy Power, about holding the lantus and humalog and she will now just bolus for her food and correction via her pump. She is stable for discharge from my perspective and I contacted Dr. Martha Aviles about this over tiger text and recommended that she NOT be discharged on prednisone as her lip swelling is much better and this will only make her blood sugars more difficult to manage. Current settings are as follows:  - basal: 12a: 1.45, 6a: 1.55, 10p: 1.4  - Carb ratio: 10  - sensitivity: 20  - target: 12a: 130-150, 6a: 100-130, 10p: 130-150  - active insulin time: 3 hours    Assessment/Plan:  1) Type 1 diabetes uncontrolled: her last A1c was 9.3% in 7/19. She was stable on her insulin pump prior to admission so have restarted this today and she has already bolused for her high sugar. She is stable for discharge from my perspective to resume her previous pump settings.     - stop lantus and humalog  - bolus per insulin pump  - she has f/u with me already scheduled for 9/17/19 at 9:10 am  - I told her I am on call this weekend if she has any questions or concerns     Please don't hesitate to call [phone] with further questions.     I spent 15 minutes of face to face time on her case and > 50% of the time was Patient : Cirstal Laurent Age: 57 year old Sex: female   MRN: [id number] Encounter Date: 12/21/2022      History     Chief Complaint   Patient presents with   • Chest Pain Adult   • Arm Pain     HPI  History is reported by the patient.      57-year-old female presents emergency department for evaluation of chest pain.  Patient states she has been having intermittent chest pain for the past 5 days, went to urgent care on Monday for this as well as sinus symptoms and was told that her chest pain was likely due to her other symptoms.  She was started on prednisone and antibiotics which she has been taking.      Reason for today's ED visit  is that she started this morning at 6:30 a.m. she started noticing a sharp and stabbing left-sided chest pain which radiates to her left shoulder.  She states that she has never had this before.  She reports the pain is worse with exertion.  She does report that pain comes and goes however does not completely resolved.  Nothing seems to make the pain better or worse, she does report sometimes deep breathing makes the pain worse.  left-sided chest pain radiating to left shoulder for the past 5 days, gradually worsening.  She states that she does have shortness of breath as well, mostly with exertion.     No personal history of cardiac disease, +familial hx. Denies alcohol/drug use. No tobacco use.    The patient denies any fever/chills, cough, congestion, difficulty breathing or shortness of breath, difficulty swallowing or drooling, nausea/vomiting, abdominal pain, neck pain/stiffness, lightheadedness, dizziness, headaches, vision changes, changes in bowel or urinary habits. The patient also denies any numbness, tingling or weakness of extremities. No recent illnesses or abx. No known ill contacts. No recent travel.     No Known Allergies    Current Discharge Medication List      Prior to Admission Medications    Details   albuterol 108 (90 Base) MCG/ACT inhaler Inhale 2 puffs into  spent reviewing the chart and coordinating her care with Dr. Lisandro Castillo and the nurse caring for her.  Nanci Laguna MD the lungs every 4 hours as needed for Shortness of Breath or Wheezing.  Qty: 8.5 g, Refills: 0      predniSONE (DELTASONE) 20 MG tablet Take 2 tablets by mouth daily for 3 days.  Qty: 6 tablet, Refills: 0      amoxicillin-clavulanate (Augmentin) 875-125 MG per tablet Take 1 tablet by mouth every 12 hours for 10 days.  Qty: 20 tablet, Refills: 0      azithromycin (ZITHROMAX) 250 MG tablet Take 2 tablets today, then 1 tablet daily for 4 days.  Qty: 6 tablet, Refills: 0      predniSONE (DELTASONE) 10 MG tablet Take 6 tablets orally at once daily, then decrease by one tablet every other day. (7-4-6-8-3-5-3-3-2-2-1-1). Take with food.  Qty: 42 tablet, Refills: 0      albuterol 108 (90 Base) MCG/ACT inhaler Inhale 2 puffs into the lungs every 4 hours as needed for Shortness of Breath or Wheezing.  Qty: 6.7 g, Refills: 0      Probiotic Product (PROBIOTIC PO) Take 1 capsule by mouth. Floragen           Past Medical History:   Diagnosis Date   • Abnormal Pap smear of cervix    • Nephrolithiasis     2014   • Ovarian cyst, left        Past Surgical History:   Procedure Laterality Date   • HYSTERECTOMY      without oophorectomy; regarding repeated abnormal Paps   • INNER EAR SURGERY      right ear       Family History   Problem Relation Age of Onset   • Diabetes Father    • High blood pressure Mother    • Heart Paternal Aunt         CAD       Social History     Tobacco Use   • Smoking status: Former     Types: Cigarettes     Quit date: 2005     Years since quittin.4   • Smokeless tobacco: Never   Substance Use Topics   • Alcohol use: Yes     Comment: occassional   • Drug use: No       E-cigarette/Vaping     E-Cigarette/Vaping Substances & Devices       Review of Systems   Constitutional: Positive for fatigue. Negative for activity change, appetite change, chills and fever.   HENT: Positive for sinus pressure.    Eyes: Negative.    Respiratory: Positive for chest tightness. Negative for cough, shortness of breath  and wheezing.    Cardiovascular: Positive for chest pain. Negative for leg swelling.   Gastrointestinal: Negative.    Genitourinary: Negative.    Musculoskeletal: Negative.    Neurological: Negative.      Physical Exam     Vitals:    12/21/22 1315 12/21/22 1330 12/21/22 1345 12/21/22 1400   BP: (!) 144/87 136/85 (!) 143/90 132/86   Pulse: 79 83 79 78   Resp: 17 (!) 38 14 19   Temp:       TempSrc:       SpO2: 97% 97% 98% 98%   Weight:       Height:          Physical Exam  Vitals and nursing note reviewed.   Constitutional:       General: She is not in acute distress.     Appearance: Normal appearance. She is well-developed. She is not ill-appearing, toxic-appearing or diaphoretic.   HENT:      Head: Normocephalic and atraumatic.      Right Ear: Hearing, tympanic membrane, ear canal and external ear normal.      Left Ear: Hearing, tympanic membrane, ear canal and external ear normal.      Nose: Nose normal.      Mouth/Throat:      Pharynx: Uvula midline.   Eyes:      General: Lids are normal.      Conjunctiva/sclera: Conjunctivae normal.      Pupils: Pupils are equal, round, and reactive to light.   Neck:      Trachea: Phonation normal.   Cardiovascular:      Rate and Rhythm: Normal rate and regular rhythm.      Pulses:           Radial pulses are 2+ on the right side and 2+ on the left side.   Pulmonary:      Effort: Pulmonary effort is normal. No accessory muscle usage or respiratory distress.      Breath sounds: Normal breath sounds. No stridor. No decreased breath sounds, wheezing, rhonchi or rales.   Chest:      Chest wall: No deformity, swelling, tenderness or edema.   Abdominal:      General: Bowel sounds are normal. There is no distension or abdominal bruit.      Palpations: Abdomen is soft. Abdomen is not rigid. There is no pulsatile mass.      Tenderness: There is no abdominal tenderness. There is no guarding or rebound.   Musculoskeletal:      Cervical back: Normal range of motion.   Skin:     General: Skin  is warm and dry.      Coloration: Skin is not pale.      Findings: No bruising or rash.   Neurological:      Mental Status: She is alert and oriented to person, place, and time.      GCS: GCS eye subscore is 4. GCS verbal subscore is 5. GCS motor subscore is 6.      Sensory: No sensory deficit.   Psychiatric:         Speech: Speech normal.         Behavior: Behavior normal.       ED Course     Procedures    Lab Results     Results for orders placed or performed during the hospital encounter of 12/21/22   COVID/Flu/RSV panel   Result Value Ref Range    Rapid SARS-COV-2 by PCR Not Detected Not Detected / Detected / Presumptive Positive / Inhibitors present    Influenza A by PCR Not Detected Not Detected    Influenza B by PCR Not Detected Not Detected    RSV BY PCR Not Detected Not Detected    Isolation Guidelines      Procedural Comment     Comprehensive Metabolic Panel   Result Value Ref Range    Fasting Status      Sodium 140 135 - 145 mmol/L    Potassium 3.5 3.4 - 5.1 mmol/L    Chloride 103 97 - 110 mmol/L    Carbon Dioxide 25 21 - 32 mmol/L    Anion Gap 16 7 - 19 mmol/L    Glucose 87 70 - 99 mg/dL    BUN 21 (H) 6 - 20 mg/dL    Creatinine 0.73 0.51 - 0.95 mg/dL    Glomerular Filtration Rate >90 >=60    BUN/ Creatinine Ratio 29 (H) 7 - 25    Calcium 9.2 8.4 - 10.2 mg/dL    Bilirubin, Total 1.0 0.2 - 1.0 mg/dL    GOT/AST 17 <=37 Units/L    GPT/ALT 28 <64 Units/L    Alkaline Phosphatase 87 45 - 117 Units/L    Albumin 4.3 3.6 - 5.1 g/dL    Protein, Total 7.8 6.4 - 8.2 g/dL    Globulin 3.5 2.0 - 4.0 g/dL    A/G Ratio 1.2 1.0 - 2.4   TROPONIN I, HIGH SENSITIVITY   Result Value Ref Range    Troponin I, High Sensitivity 4 <52 ng/L   NT proBNP   Result Value Ref Range    NT-proBNP 48 <=125 pg/mL   Magnesium   Result Value Ref Range    Magnesium 2.0 1.7 - 2.4 mg/dL   Lipase   Result Value Ref Range    Lipase 104 73 - 393 Units/L   D Dimer, Quantitative   Result Value Ref Range    D Dimer, Quantitative 0.41 <0.57 mg/L (FEU)    Prothrombin Time   Result Value Ref Range    Prothrombin Time 10.0 9.7 - 11.8 sec    INR 1.0     Lactic Acid Venous With Reflex   Result Value Ref Range    Lactate, Venous 0.9 0.0 - 2.0 mmol/L   Procalcitonin   Result Value Ref Range    Procalcitonin <0.05 <=0.09 ng/mL   CBC with Automated Differential (performable only)   Result Value Ref Range    WBC 7.1 4.2 - 11.0 K/mcL    RBC 4.67 4.00 - 5.20 mil/mcL    HGB 13.5 12.0 - 15.5 g/dL    HCT 41.0 36.0 - 46.5 %    MCV 87.8 78.0 - 100.0 fl    MCH 28.9 26.0 - 34.0 pg    MCHC 32.9 32.0 - 36.5 g/dL    RDW-CV 13.0 11.0 - 15.0 %    RDW-SD 41.9 39.0 - 50.0 fL     140 - 450 K/mcL    NRBC 0 <=0 /100 WBC    Neutrophil, Percent 60 %    Lymphocytes, Percent 24 %    Mono, Percent 9 %    Eosinophils, Percent 6 %    Basophils, Percent 1 %    Immature Granulocytes 0 %    Absolute Neutrophils 4.3 1.8 - 7.7 K/mcL    Absolute Lymphocytes 1.7 1.0 - 4.0 K/mcL    Absolute Monocytes 0.6 0.3 - 0.9 K/mcL    Absolute Eosinophils  0.5 0.0 - 0.5 K/mcL    Absolute Basophils 0.1 0.0 - 0.3 K/mcL    Absolute Immmature Granulocytes 0.0 0.0 - 0.2 K/mcL   Lipid Panel With Reflex   Result Value Ref Range    Fasting Status      Cholesterol 308 (H) <=199 mg/dL    Triglycerides 161 (H) <=149 mg/dL    HDL 68 >=50 mg/dL     (H) <=129 mg/dL    Non-HDL Cholesterol 240 mg/dL    Cholesterol/ HDL Ratio 4.5 (H) <=4.4   TROPONIN I, HIGH SENSITIVITY   Result Value Ref Range    Troponin I, High Sensitivity 5 <52 ng/L       EKG Results     EKG Interpretation  Rate: 78  Rhythm: normal sinus rhythm   Abnormality: no    EKG tracing interpreted by ED physician, Dr. Reid    Radiology Results     Imaging Results          XR CHEST AP OR PA - PORTABLE (Final result)  Result time 12/21/22 12:56:44    Final result                 Impression:    IMPRESSION:   No acute intrathoracic abnormalities.    Signed by: Higinio Marin             Narrative:    INTERPRETATION LOCATION: [facility]  Area    PROCEDURE: XR CHEST AP OR PA    DATE: 12/21/2022 12:39    COMPARISONS: 12/19/2022    CLINICAL INDICATION/ORDERING DIAGNOSES: chest pain No Clinical  Info      FINDINGS: Cardiac size is normal. Calcific aortic  atherosclerosis. Mediastinal and hilar contours are normal.  Trachea is not abnormally deviated. The lungs and pleural spaces  are clear.                                  ED Medication Orders (From admission, onward)    None        MDM    This patient is a 57 year old female with a chief complaint of chest pain radiating to left arm, sob. Vitals and physical exam as above. EPIC records were reviewed. Multiple DDx were considered but not limited to  cardiac event, cardiac arrhythmia, CHF, PE, pulmonary infection, electrolyte abnormality, dehydration, malignancy/mass, pulmonary disease, musculoskeletal pain, etc..    Lab work was ordered and reviewed, please see results above.  Labs reassuring, troponin x2 negative.  Cholesterol panel elevated.    A CXR was also ordered and reviewed by radiology. Imaging shows no acute abnormalities; full report above.    An EKG was taken and showed a NSR, reviewed by supervising physician.     A cardiac monitor and oximeter were placed during the patient's Emergency Department stay.     The patient was provided with p.o. aspirin 325 mg.     Given diagnosis of chest pain, as well as abnormal labs/imaging, admission to the hospital is considered medically necessary. Hospitalist, Dr. James calderon, was contacted and case was discussed. Dr. James calderon agreed with admission. The patient was  updated throughout visit on labs, imaging, and benefits of hospital admission. The patient is in agreement with plan. No additional questions or concerns.      Clinical Impression     ED Diagnosis   1. Chest pain, unspecified type          Disposition        Admit 12/21/2022  2:46 PM  Telemetry Bed?: Yes  Admitting Physician: EDDIE BHARDWAJ [[id number]]  Is this a telephone or verbal  order?: This is a telephone order from the admitting physician    The supervising physician for services performed today is Dr. Reid.    ANGEL Reyes PA-C  12/21/22 [id number]

## 2023-01-19 ENCOUNTER — TELEPHONE (OUTPATIENT)
Dept: ENDOCRINOLOGY | Age: 53
End: 2023-01-19

## 2023-01-19 NOTE — TELEPHONE ENCOUNTER
----- Message from Suzi Preston sent at 1/19/2023 11:56 AM EST -----  Regarding: RE: appt  Patient stated she was okay and would see you for her 3/14 appt.  ----- Message -----  From: Branden Leo MD  Sent: 1/19/2023  11:41 AM EST  To: Suzi Preston  Subject: RE: appt                                         Ok.  Just let me know if you end up putting her on for tomorrow or not. Thanks.  ----- Message -----  From: Deonte Liu  Sent: 1/19/2023  11:36 AM EST  To: Jesse Prakash MD  Subject: RE: appt                                         Okay I will give her a call back. I informed her of her march appt but was insisting she was supposed to be seen today as a squeeze in. Thank you for clarifying.  ----- Message -----  From: Branden Leo MD  Sent: 1/19/2023  11:31 AM EST  To: Suzi Preston  Subject: RE: appt                                         This is not true. She is supposed to see me on 3/14/23 at 9:10am.  If she feels she needs to be seen sooner, I can't see her today but I have several openings tomorrow and you are welcome to give one of them to her. Thanks.  ----- Message -----  From: Deonte Liu  Sent: 1/19/2023  10:53 AM EST  To: Jesse Prakash MD  Subject: appt                                             Hi Dr Miles Coyle,    This patient is insisting she was supposed to see you today and was told she would be squeezed into your schedule. Is this true?  Thank you

## 2023-03-05 ENCOUNTER — HOSPITAL ENCOUNTER (INPATIENT)
Age: 53
LOS: 4 days | Discharge: HOME HEALTH | End: 2023-03-10
Attending: EMERGENCY MEDICINE | Admitting: ANESTHESIOLOGY
Payer: MEDICARE

## 2023-03-05 ENCOUNTER — APPOINTMENT (OUTPATIENT)
Dept: GENERAL RADIOLOGY | Age: 53
End: 2023-03-05
Attending: EMERGENCY MEDICINE
Payer: MEDICARE

## 2023-03-05 DIAGNOSIS — I21.4 NSTEMI (NON-ST ELEVATED MYOCARDIAL INFARCTION) (HCC): ICD-10-CM

## 2023-03-05 DIAGNOSIS — E10.21 TYPE 1 DIABETES MELLITUS WITH NEPHROPATHY (HCC): ICD-10-CM

## 2023-03-05 DIAGNOSIS — G93.41 ACUTE METABOLIC ENCEPHALOPATHY: ICD-10-CM

## 2023-03-05 DIAGNOSIS — A41.9 SEPSIS, DUE TO UNSPECIFIED ORGANISM, UNSPECIFIED WHETHER ACUTE ORGAN DYSFUNCTION PRESENT (HCC): ICD-10-CM

## 2023-03-05 DIAGNOSIS — J96.00 ACUTE RESPIRATORY FAILURE, UNSPECIFIED WHETHER WITH HYPOXIA OR HYPERCAPNIA (HCC): ICD-10-CM

## 2023-03-05 DIAGNOSIS — N17.9 AKI (ACUTE KIDNEY INJURY) (HCC): ICD-10-CM

## 2023-03-05 DIAGNOSIS — E10.11 TYPE 1 DIABETES MELLITUS WITH KETOACIDOTIC COMA (HCC): Primary | ICD-10-CM

## 2023-03-05 DIAGNOSIS — J18.9 MULTIFOCAL PNEUMONIA: ICD-10-CM

## 2023-03-05 DIAGNOSIS — Z91.14 NONCOMPLIANCE WITH MEDICATIONS: ICD-10-CM

## 2023-03-05 DIAGNOSIS — E87.6 ACUTE HYPOKALEMIA: ICD-10-CM

## 2023-03-05 LAB
GLUCOSE BLD STRIP.AUTO-MCNC: >600 MG/DL (ref 65–117)
GLUCOSE BLD STRIP.AUTO-MCNC: >600 MG/DL (ref 65–117)
LACTATE BLD-SCNC: 6.04 MMOL/L (ref 0.4–2)
SERVICE CMNT-IMP: ABNORMAL
SERVICE CMNT-IMP: ABNORMAL
TROPONIN I SERPL HS-MCNC: 111 NG/L (ref 0–51)

## 2023-03-05 PROCEDURE — 36415 COLL VENOUS BLD VENIPUNCTURE: CPT

## 2023-03-05 PROCEDURE — 80053 COMPREHEN METABOLIC PANEL: CPT

## 2023-03-05 PROCEDURE — 74011250636 HC RX REV CODE- 250/636: Performed by: EMERGENCY MEDICINE

## 2023-03-05 PROCEDURE — 99285 EMERGENCY DEPT VISIT HI MDM: CPT

## 2023-03-05 PROCEDURE — 83605 ASSAY OF LACTIC ACID: CPT

## 2023-03-05 PROCEDURE — 84484 ASSAY OF TROPONIN QUANT: CPT

## 2023-03-05 PROCEDURE — 75810000455 HC PLCMT CENT VENOUS CATH LVL 2 5182

## 2023-03-05 PROCEDURE — 82962 GLUCOSE BLOOD TEST: CPT

## 2023-03-05 PROCEDURE — 85025 COMPLETE CBC W/AUTO DIFF WBC: CPT

## 2023-03-05 PROCEDURE — 87804 INFLUENZA ASSAY W/OPTIC: CPT

## 2023-03-05 PROCEDURE — 71045 X-RAY EXAM CHEST 1 VIEW: CPT

## 2023-03-05 PROCEDURE — 87635 SARS-COV-2 COVID-19 AMP PRB: CPT

## 2023-03-05 PROCEDURE — 82010 KETONE BODYS QUAN: CPT

## 2023-03-05 RX ORDER — SODIUM CHLORIDE 0.9 % (FLUSH) 0.9 %
5-10 SYRINGE (ML) INJECTION AS NEEDED
Status: DISCONTINUED | OUTPATIENT
Start: 2023-03-05 | End: 2023-03-10 | Stop reason: HOSPADM

## 2023-03-05 RX ADMIN — SODIUM CHLORIDE 1000 ML: 9 INJECTION, SOLUTION INTRAVENOUS at 22:34

## 2023-03-06 ENCOUNTER — APPOINTMENT (OUTPATIENT)
Dept: GENERAL RADIOLOGY | Age: 53
End: 2023-03-06
Attending: INTERNAL MEDICINE
Payer: MEDICARE

## 2023-03-06 ENCOUNTER — APPOINTMENT (OUTPATIENT)
Dept: GENERAL RADIOLOGY | Age: 53
End: 2023-03-06
Attending: EMERGENCY MEDICINE
Payer: MEDICARE

## 2023-03-06 PROBLEM — E11.10 DKA (DIABETIC KETOACIDOSIS) (HCC): Status: ACTIVE | Noted: 2023-03-06

## 2023-03-06 LAB
ADMINISTERED INITIALS, ADMINIT: NORMAL
ALBUMIN SERPL-MCNC: 3.4 G/DL (ref 3.5–5)
ALBUMIN/GLOB SERPL: 0.9 (ref 1.1–2.2)
ALP SERPL-CCNC: 107 U/L (ref 45–117)
ALT SERPL-CCNC: 42 U/L (ref 12–78)
AMMONIA PLAS-SCNC: 51 UMOL/L
AMORPH CRY URNS QL MICRO: ABNORMAL
AMORPH CRY URNS QL MICRO: ABNORMAL
ANION GAP BLD CALC-SCNC: 22 (ref 10–20)
ANION GAP SERPL CALC-SCNC: 13 MMOL/L (ref 5–15)
ANION GAP SERPL CALC-SCNC: 23 MMOL/L (ref 5–15)
ANION GAP SERPL CALC-SCNC: 24 MMOL/L (ref 5–15)
ANION GAP SERPL CALC-SCNC: 24 MMOL/L (ref 5–15)
ANION GAP SERPL CALC-SCNC: 31 MMOL/L (ref 5–15)
ANION GAP SERPL CALC-SCNC: 36 MMOL/L (ref 5–15)
ANION GAP SERPL CALC-SCNC: 7 MMOL/L (ref 5–15)
ANION GAP SERPL CALC-SCNC: 7 MMOL/L (ref 5–15)
APPEARANCE UR: ABNORMAL
APPEARANCE UR: CLEAR
AST SERPL-CCNC: 38 U/L (ref 15–37)
B-OH-BUTYR SERPL-SCNC: >4.42 MMOL/L
BACTERIA URNS QL MICRO: NEGATIVE /HPF
BACTERIA URNS QL MICRO: NEGATIVE /HPF
BASE DEFICIT BLD-SCNC: 21.1 MMOL/L
BASE DEFICIT BLDV-SCNC: 13 MMOL/L
BASOPHILS # BLD: 0 K/UL (ref 0–0.1)
BASOPHILS # BLD: 0.1 K/UL (ref 0–0.1)
BASOPHILS NFR BLD: 0 % (ref 0–1)
BASOPHILS NFR BLD: 0 % (ref 0–1)
BDY SITE: ABNORMAL
BILIRUB SERPL-MCNC: 0.5 MG/DL (ref 0.2–1)
BILIRUB UR QL: NEGATIVE
BILIRUB UR QL: NEGATIVE
BUN SERPL-MCNC: 41 MG/DL (ref 6–20)
BUN SERPL-MCNC: 46 MG/DL (ref 6–20)
BUN SERPL-MCNC: 48 MG/DL (ref 6–20)
BUN SERPL-MCNC: 49 MG/DL (ref 6–20)
BUN SERPL-MCNC: 50 MG/DL (ref 6–20)
BUN SERPL-MCNC: 52 MG/DL (ref 6–20)
BUN SERPL-MCNC: 52 MG/DL (ref 6–20)
BUN SERPL-MCNC: 53 MG/DL (ref 6–20)
BUN/CREAT SERPL: 17 (ref 12–20)
BUN/CREAT SERPL: 19 (ref 12–20)
BUN/CREAT SERPL: 21 (ref 12–20)
CA-I BLD-MCNC: 0.9 MMOL/L (ref 1.12–1.32)
CALCIUM SERPL-MCNC: 7.6 MG/DL (ref 8.5–10.1)
CALCIUM SERPL-MCNC: 7.8 MG/DL (ref 8.5–10.1)
CALCIUM SERPL-MCNC: 8.1 MG/DL (ref 8.5–10.1)
CALCIUM SERPL-MCNC: 8.2 MG/DL (ref 8.5–10.1)
CALCIUM SERPL-MCNC: 8.3 MG/DL (ref 8.5–10.1)
CALCIUM SERPL-MCNC: 8.7 MG/DL (ref 8.5–10.1)
CHLORIDE BLD-SCNC: 98 MMOL/L (ref 100–108)
CHLORIDE SERPL-SCNC: 103 MMOL/L (ref 97–108)
CHLORIDE SERPL-SCNC: 103 MMOL/L (ref 97–108)
CHLORIDE SERPL-SCNC: 108 MMOL/L (ref 97–108)
CHLORIDE SERPL-SCNC: 115 MMOL/L (ref 97–108)
CHLORIDE SERPL-SCNC: 117 MMOL/L (ref 97–108)
CHLORIDE SERPL-SCNC: 120 MMOL/L (ref 97–108)
CHLORIDE SERPL-SCNC: 88 MMOL/L (ref 97–108)
CHLORIDE SERPL-SCNC: 99 MMOL/L (ref 97–108)
CO2 BLD-SCNC: 6 MMOL/L (ref 19–24)
CO2 SERPL-SCNC: 11 MMOL/L (ref 21–32)
CO2 SERPL-SCNC: 13 MMOL/L (ref 21–32)
CO2 SERPL-SCNC: 14 MMOL/L (ref 21–32)
CO2 SERPL-SCNC: 19 MMOL/L (ref 21–32)
CO2 SERPL-SCNC: 22 MMOL/L (ref 21–32)
CO2 SERPL-SCNC: 24 MMOL/L (ref 21–32)
CO2 SERPL-SCNC: 6 MMOL/L (ref 21–32)
CO2 SERPL-SCNC: 8 MMOL/L (ref 21–32)
COLOR UR: ABNORMAL
COLOR UR: ABNORMAL
CREAT SERPL-MCNC: 2.28 MG/DL (ref 0.55–1.02)
CREAT SERPL-MCNC: 2.48 MG/DL (ref 0.55–1.02)
CREAT SERPL-MCNC: 2.57 MG/DL (ref 0.55–1.02)
CREAT SERPL-MCNC: 2.73 MG/DL (ref 0.55–1.02)
CREAT SERPL-MCNC: 2.77 MG/DL (ref 0.55–1.02)
CREAT SERPL-MCNC: 2.77 MG/DL (ref 0.55–1.02)
CREAT SERPL-MCNC: 2.78 MG/DL (ref 0.55–1.02)
CREAT SERPL-MCNC: 2.98 MG/DL (ref 0.55–1.02)
CREAT UR-MCNC: 2.3 MG/DL (ref 0.6–1.3)
D50 ADMINISTERED, D50ADM: 0 ML
D50 ORDER, D50ORD: 0 ML
DIFFERENTIAL METHOD BLD: ABNORMAL
DIFFERENTIAL METHOD BLD: ABNORMAL
EOSINOPHIL # BLD: 0 K/UL (ref 0–0.4)
EOSINOPHIL # BLD: 0 K/UL (ref 0–0.4)
EOSINOPHIL NFR BLD: 0 % (ref 0–7)
EOSINOPHIL NFR BLD: 0 % (ref 0–7)
EPITH CASTS URNS QL MICRO: ABNORMAL /LPF
EPITH CASTS URNS QL MICRO: ABNORMAL /LPF
ERYTHROCYTE [DISTWIDTH] IN BLOOD BY AUTOMATED COUNT: 16.7 % (ref 11.5–14.5)
ERYTHROCYTE [DISTWIDTH] IN BLOOD BY AUTOMATED COUNT: 17.8 % (ref 11.5–14.5)
FLUAV AG NPH QL IA: NEGATIVE
FLUBV AG NOSE QL IA: NEGATIVE
GAS FLOW.O2 O2 DELIVERY SYS: 4 L/MIN
GLOBULIN SER CALC-MCNC: 3.6 G/DL (ref 2–4)
GLUCOSE BLD STRIP.AUTO-MCNC: 120 MG/DL (ref 65–117)
GLUCOSE BLD STRIP.AUTO-MCNC: 128 MG/DL (ref 65–117)
GLUCOSE BLD STRIP.AUTO-MCNC: 130 MG/DL (ref 65–117)
GLUCOSE BLD STRIP.AUTO-MCNC: 136 MG/DL (ref 65–117)
GLUCOSE BLD STRIP.AUTO-MCNC: 141 MG/DL (ref 65–117)
GLUCOSE BLD STRIP.AUTO-MCNC: 160 MG/DL (ref 65–117)
GLUCOSE BLD STRIP.AUTO-MCNC: 164 MG/DL (ref 65–117)
GLUCOSE BLD STRIP.AUTO-MCNC: 168 MG/DL (ref 65–117)
GLUCOSE BLD STRIP.AUTO-MCNC: 205 MG/DL (ref 65–117)
GLUCOSE BLD STRIP.AUTO-MCNC: 380 MG/DL (ref 65–117)
GLUCOSE BLD STRIP.AUTO-MCNC: 521 MG/DL (ref 65–117)
GLUCOSE BLD STRIP.AUTO-MCNC: 591 MG/DL (ref 65–117)
GLUCOSE BLD STRIP.AUTO-MCNC: >600 MG/DL (ref 65–117)
GLUCOSE BLD STRIP.AUTO-MCNC: >700 MG/DL (ref 74–106)
GLUCOSE SERPL-MCNC: 1076 MG/DL (ref 65–100)
GLUCOSE SERPL-MCNC: 130 MG/DL (ref 65–100)
GLUCOSE SERPL-MCNC: 1341 MG/DL (ref 65–100)
GLUCOSE SERPL-MCNC: 183 MG/DL (ref 65–100)
GLUCOSE SERPL-MCNC: 384 MG/DL (ref 65–100)
GLUCOSE SERPL-MCNC: 670 MG/DL (ref 65–100)
GLUCOSE SERPL-MCNC: 795 MG/DL (ref 65–100)
GLUCOSE SERPL-MCNC: 815 MG/DL (ref 65–100)
GLUCOSE UR STRIP.AUTO-MCNC: >1000 MG/DL
GLUCOSE UR STRIP.AUTO-MCNC: >1000 MG/DL
GLUCOSE, GLC: 120 MG/DL
GLUCOSE, GLC: 128 MG/DL
GLUCOSE, GLC: 130 MG/DL
GLUCOSE, GLC: 136 MG/DL
GLUCOSE, GLC: 141 MG/DL
GLUCOSE, GLC: 160 MG/DL
GLUCOSE, GLC: 164 MG/DL
GLUCOSE, GLC: 168 MG/DL
GLUCOSE, GLC: 205 MG/DL
GLUCOSE, GLC: 380 MG/DL
GLUCOSE, GLC: 521 MG/DL
GLUCOSE, GLC: 591 MG/DL
GLUCOSE, GLC: 600 MG/DL
GLUCOSE, GLC: 601 MG/DL
GRAN CASTS URNS QL MICRO: ABNORMAL /LPF
GRAN CASTS URNS QL MICRO: ABNORMAL /LPF
HCO3 BLDA-SCNC: 7 MMOL/L
HCO3 BLDV-SCNC: 12 MMOL/L (ref 23–28)
HCT VFR BLD AUTO: 33.2 % (ref 35–47)
HCT VFR BLD AUTO: 40.5 % (ref 35–47)
HGB BLD-MCNC: 10.3 G/DL (ref 11.5–16)
HGB BLD-MCNC: 11.2 G/DL (ref 11.5–16)
HGB UR QL STRIP: ABNORMAL
HGB UR QL STRIP: ABNORMAL
HIGH TARGET, HITG: 200 MG/DL
HYALINE CASTS URNS QL MICRO: ABNORMAL /LPF (ref 0–5)
IMM GRANULOCYTES # BLD AUTO: 0.2 K/UL (ref 0–0.04)
IMM GRANULOCYTES # BLD AUTO: 0.4 K/UL (ref 0–0.04)
IMM GRANULOCYTES NFR BLD AUTO: 1 % (ref 0–0.5)
IMM GRANULOCYTES NFR BLD AUTO: 2 % (ref 0–0.5)
INSULIN ADMINSTERED, INSADM: 1.7 UNITS/HOUR
INSULIN ADMINSTERED, INSADM: 10.8 UNITS/HOUR
INSULIN ADMINSTERED, INSADM: 11.6 UNITS/HOUR
INSULIN ADMINSTERED, INSADM: 16.2 UNITS/HOUR
INSULIN ADMINSTERED, INSADM: 2.2 UNITS/HOUR
INSULIN ADMINSTERED, INSADM: 2.2 UNITS/HOUR
INSULIN ADMINSTERED, INSADM: 2.3 UNITS/HOUR
INSULIN ADMINSTERED, INSADM: 2.5 UNITS/HOUR
INSULIN ADMINSTERED, INSADM: 21.6 UNITS/HOUR
INSULIN ADMINSTERED, INSADM: 25.6 UNITS/HOUR
INSULIN ADMINSTERED, INSADM: 27.1 UNITS/HOUR
INSULIN ADMINSTERED, INSADM: 27.1 UNITS/HOUR
INSULIN ADMINSTERED, INSADM: 3.5 UNITS/HOUR
INSULIN ADMINSTERED, INSADM: 32.5 UNITS/HOUR
INSULIN ADMINSTERED, INSADM: 36.9 UNITS/HOUR
INSULIN ADMINSTERED, INSADM: 37.2 UNITS/HOUR
INSULIN ADMINSTERED, INSADM: 4.9 UNITS/HOUR
INSULIN ADMINSTERED, INSADM: 8 UNITS/HOUR
INSULIN ORDER, INSORD: 1.7 UNITS/HOUR
INSULIN ORDER, INSORD: 10.8 UNITS/HOUR
INSULIN ORDER, INSORD: 11.6 UNITS/HOUR
INSULIN ORDER, INSORD: 16.2 UNITS/HOUR
INSULIN ORDER, INSORD: 2.2 UNITS/HOUR
INSULIN ORDER, INSORD: 2.2 UNITS/HOUR
INSULIN ORDER, INSORD: 2.3 UNITS/HOUR
INSULIN ORDER, INSORD: 2.5 UNITS/HOUR
INSULIN ORDER, INSORD: 21.6 UNITS/HOUR
INSULIN ORDER, INSORD: 25.6 UNITS/HOUR
INSULIN ORDER, INSORD: 27.1 UNITS/HOUR
INSULIN ORDER, INSORD: 27.1 UNITS/HOUR
INSULIN ORDER, INSORD: 3.5 UNITS/HOUR
INSULIN ORDER, INSORD: 32.5 UNITS/HOUR
INSULIN ORDER, INSORD: 36.9 UNITS/HOUR
INSULIN ORDER, INSORD: 37.2 UNITS/HOUR
INSULIN ORDER, INSORD: 4.9 UNITS/HOUR
INSULIN ORDER, INSORD: 8 UNITS/HOUR
KETONES UR QL STRIP.AUTO: 40 MG/DL
KETONES UR QL STRIP.AUTO: 80 MG/DL
LACTATE BLD-SCNC: 3.85 MMOL/L (ref 0.4–2)
LEUKOCYTE ESTERASE UR QL STRIP.AUTO: NEGATIVE
LEUKOCYTE ESTERASE UR QL STRIP.AUTO: NEGATIVE
LOW TARGET, LOT: 150 MG/DL
LYMPHOCYTES # BLD: 1.1 K/UL (ref 0.8–3.5)
LYMPHOCYTES # BLD: 2.4 K/UL (ref 0.8–3.5)
LYMPHOCYTES NFR BLD: 12 % (ref 12–49)
LYMPHOCYTES NFR BLD: 5 % (ref 12–49)
MAGNESIUM SERPL-MCNC: 1.9 MG/DL (ref 1.6–2.4)
MAGNESIUM SERPL-MCNC: 2 MG/DL (ref 1.6–2.4)
MAGNESIUM SERPL-MCNC: 2.3 MG/DL (ref 1.6–2.4)
MAGNESIUM SERPL-MCNC: 2.4 MG/DL (ref 1.6–2.4)
MAGNESIUM SERPL-MCNC: 2.5 MG/DL (ref 1.6–2.4)
MCH RBC QN AUTO: 26.7 PG (ref 26–34)
MCH RBC QN AUTO: 26.8 PG (ref 26–34)
MCHC RBC AUTO-ENTMCNC: 27.7 G/DL (ref 30–36.5)
MCHC RBC AUTO-ENTMCNC: 31 G/DL (ref 30–36.5)
MCV RBC AUTO: 86 FL (ref 80–99)
MCV RBC AUTO: 96.9 FL (ref 80–99)
MINUTES UNTIL NEXT BG, NBG: 60 MIN
MONOCYTES # BLD: 1.4 K/UL (ref 0–1)
MONOCYTES # BLD: 2.3 K/UL (ref 0–1)
MONOCYTES NFR BLD: 11 % (ref 5–13)
MONOCYTES NFR BLD: 7 % (ref 5–13)
MULTIPLIER, MUL: 0.02
MULTIPLIER, MUL: 0.03
MULTIPLIER, MUL: 0.03
MULTIPLIER, MUL: 0.04
MULTIPLIER, MUL: 0.04
MULTIPLIER, MUL: 0.05
MULTIPLIER, MUL: 0.06
MULTIPLIER, MUL: 0.06
MULTIPLIER, MUL: 0.07
MULTIPLIER, MUL: 0.08
NEUTS SEG # BLD: 16.2 K/UL (ref 1.8–8)
NEUTS SEG # BLD: 17.2 K/UL (ref 1.8–8)
NEUTS SEG NFR BLD: 80 % (ref 32–75)
NEUTS SEG NFR BLD: 82 % (ref 32–75)
NITRITE UR QL STRIP.AUTO: NEGATIVE
NITRITE UR QL STRIP.AUTO: NEGATIVE
NRBC # BLD: 0 K/UL (ref 0–0.01)
NRBC # BLD: 0 K/UL (ref 0–0.01)
NRBC BLD-RTO: 0 PER 100 WBC
NRBC BLD-RTO: 0 PER 100 WBC
ORDER INITIALS, ORDINIT: NORMAL
OTHER,OTHU: ABNORMAL
PCO2 BLDV: 20.8 MMHG (ref 41–51)
PCO2 BLDV: 28 MMHG (ref 41–51)
PH BLDV: 7.11 (ref 7.32–7.42)
PH BLDV: 7.26 (ref 7.32–7.42)
PH UR STRIP: 5 (ref 5–8)
PH UR STRIP: 5 (ref 5–8)
PHOSPHATE SERPL-MCNC: 1.6 MG/DL (ref 2.6–4.7)
PHOSPHATE SERPL-MCNC: 4.7 MG/DL (ref 2.6–4.7)
PLATELET # BLD AUTO: 278 K/UL (ref 150–400)
PLATELET # BLD AUTO: 359 K/UL (ref 150–400)
PMV BLD AUTO: 10.6 FL (ref 8.9–12.9)
PMV BLD AUTO: 12.1 FL (ref 8.9–12.9)
PO2 BLDV: 44 MMHG (ref 25–40)
PO2 BLDV: 84 MMHG (ref 25–40)
POTASSIUM BLD-SCNC: 4.5 MMOL/L (ref 3.5–5.5)
POTASSIUM SERPL-SCNC: 2.8 MMOL/L (ref 3.5–5.1)
POTASSIUM SERPL-SCNC: 2.9 MMOL/L (ref 3.5–5.1)
POTASSIUM SERPL-SCNC: 3.1 MMOL/L (ref 3.5–5.1)
POTASSIUM SERPL-SCNC: 3.3 MMOL/L (ref 3.5–5.1)
POTASSIUM SERPL-SCNC: 3.3 MMOL/L (ref 3.5–5.1)
POTASSIUM SERPL-SCNC: 3.6 MMOL/L (ref 3.5–5.1)
POTASSIUM SERPL-SCNC: 3.8 MMOL/L (ref 3.5–5.1)
POTASSIUM SERPL-SCNC: 4.6 MMOL/L (ref 3.5–5.1)
PROCALCITONIN SERPL-MCNC: 2.8 NG/ML
PROT SERPL-MCNC: 7 G/DL (ref 6.4–8.2)
PROT UR STRIP-MCNC: 100 MG/DL
PROT UR STRIP-MCNC: ABNORMAL MG/DL
RBC # BLD AUTO: 3.86 M/UL (ref 3.8–5.2)
RBC # BLD AUTO: 4.18 M/UL (ref 3.8–5.2)
RBC #/AREA URNS HPF: ABNORMAL /HPF (ref 0–5)
RBC #/AREA URNS HPF: ABNORMAL /HPF (ref 0–5)
RBC MORPH BLD: ABNORMAL
RBC MORPH BLD: ABNORMAL
SAO2 % BLD: 92 %
SAO2 % BLDV: 74 % (ref 65–88)
SAO2% DEVICE SAO2% SENSOR NAME: ABNORMAL
SARS-COV-2 RDRP RESP QL NAA+PROBE: NOT DETECTED
SERVICE CMNT-IMP: ABNORMAL
SODIUM BLD-SCNC: 126 MMOL/L (ref 136–145)
SODIUM SERPL-SCNC: 130 MMOL/L (ref 136–145)
SODIUM SERPL-SCNC: 138 MMOL/L (ref 136–145)
SODIUM SERPL-SCNC: 138 MMOL/L (ref 136–145)
SODIUM SERPL-SCNC: 140 MMOL/L (ref 136–145)
SODIUM SERPL-SCNC: 145 MMOL/L (ref 136–145)
SODIUM SERPL-SCNC: 147 MMOL/L (ref 136–145)
SODIUM SERPL-SCNC: 148 MMOL/L (ref 136–145)
SODIUM SERPL-SCNC: 149 MMOL/L (ref 136–145)
SODIUM UR-SCNC: 26 MMOL/L
SOURCE, COVRS: NORMAL
SP GR UR REFRACTOMETRY: 1.02
SP GR UR REFRACTOMETRY: 1.03
SPECIMEN SITE: ABNORMAL
SPECIMEN SITE: ABNORMAL
UA: UC IF INDICATED,UAUC: ABNORMAL
UA: UC IF INDICATED,UAUC: ABNORMAL
UROBILINOGEN UR QL STRIP.AUTO: 0.2 EU/DL (ref 0.2–1)
UROBILINOGEN UR QL STRIP.AUTO: 0.2 EU/DL (ref 0.2–1)
WBC # BLD AUTO: 20.2 K/UL (ref 3.6–11)
WBC # BLD AUTO: 21 K/UL (ref 3.6–11)
WBC MORPH BLD: ABNORMAL
WBC URNS QL MICRO: ABNORMAL /HPF (ref 0–4)
WBC URNS QL MICRO: ABNORMAL /HPF (ref 0–4)

## 2023-03-06 PROCEDURE — 74011000250 HC RX REV CODE- 250: Performed by: INTERNAL MEDICINE

## 2023-03-06 PROCEDURE — 84100 ASSAY OF PHOSPHORUS: CPT

## 2023-03-06 PROCEDURE — 87899 AGENT NOS ASSAY W/OPTIC: CPT

## 2023-03-06 PROCEDURE — 87449 NOS EACH ORGANISM AG IA: CPT

## 2023-03-06 PROCEDURE — 82947 ASSAY GLUCOSE BLOOD QUANT: CPT

## 2023-03-06 PROCEDURE — 82140 ASSAY OF AMMONIA: CPT

## 2023-03-06 PROCEDURE — 80048 BASIC METABOLIC PNL TOTAL CA: CPT

## 2023-03-06 PROCEDURE — 74011636637 HC RX REV CODE- 636/637: Performed by: EMERGENCY MEDICINE

## 2023-03-06 PROCEDURE — 95706 EEG WO VID 2-12HR INTMT MNTR: CPT | Performed by: INTERNAL MEDICINE

## 2023-03-06 PROCEDURE — 74011250637 HC RX REV CODE- 250/637: Performed by: INTERNAL MEDICINE

## 2023-03-06 PROCEDURE — 81001 URINALYSIS AUTO W/SCOPE: CPT

## 2023-03-06 PROCEDURE — 96374 THER/PROPH/DIAG INJ IV PUSH: CPT

## 2023-03-06 PROCEDURE — 96375 TX/PRO/DX INJ NEW DRUG ADDON: CPT

## 2023-03-06 PROCEDURE — 82962 GLUCOSE BLOOD TEST: CPT

## 2023-03-06 PROCEDURE — 74011250637 HC RX REV CODE- 250/637: Performed by: NURSE PRACTITIONER

## 2023-03-06 PROCEDURE — 74011250636 HC RX REV CODE- 250/636: Performed by: INTERNAL MEDICINE

## 2023-03-06 PROCEDURE — 83735 ASSAY OF MAGNESIUM: CPT

## 2023-03-06 PROCEDURE — 74011000250 HC RX REV CODE- 250: Performed by: EMERGENCY MEDICINE

## 2023-03-06 PROCEDURE — 36415 COLL VENOUS BLD VENIPUNCTURE: CPT

## 2023-03-06 PROCEDURE — 74011250636 HC RX REV CODE- 250/636: Performed by: EMERGENCY MEDICINE

## 2023-03-06 PROCEDURE — 99231 SBSQ HOSP IP/OBS SF/LOW 25: CPT

## 2023-03-06 PROCEDURE — 74011250636 HC RX REV CODE- 250/636: Performed by: NURSE PRACTITIONER

## 2023-03-06 PROCEDURE — 74011000250 HC RX REV CODE- 250: Performed by: NURSE PRACTITIONER

## 2023-03-06 PROCEDURE — 99291 CRITICAL CARE FIRST HOUR: CPT | Performed by: INTERNAL MEDICINE

## 2023-03-06 PROCEDURE — 84300 ASSAY OF URINE SODIUM: CPT

## 2023-03-06 PROCEDURE — 74011000258 HC RX REV CODE- 258: Performed by: EMERGENCY MEDICINE

## 2023-03-06 PROCEDURE — 65610000006 HC RM INTENSIVE CARE

## 2023-03-06 PROCEDURE — 84145 PROCALCITONIN (PCT): CPT

## 2023-03-06 PROCEDURE — 74011000258 HC RX REV CODE- 258: Performed by: INTERNAL MEDICINE

## 2023-03-06 PROCEDURE — 71045 X-RAY EXAM CHEST 1 VIEW: CPT

## 2023-03-06 PROCEDURE — 82803 BLOOD GASES ANY COMBINATION: CPT

## 2023-03-06 PROCEDURE — 87040 BLOOD CULTURE FOR BACTERIA: CPT

## 2023-03-06 PROCEDURE — 95816 EEG AWAKE AND DROWSY: CPT | Performed by: INTERNAL MEDICINE

## 2023-03-06 PROCEDURE — 77010033678 HC OXYGEN DAILY

## 2023-03-06 PROCEDURE — 74011000258 HC RX REV CODE- 258: Performed by: NURSE PRACTITIONER

## 2023-03-06 PROCEDURE — 85025 COMPLETE CBC W/AUTO DIFF WBC: CPT

## 2023-03-06 RX ORDER — ACETAMINOPHEN 650 MG/1
650 SUPPOSITORY RECTAL
Status: DISCONTINUED | OUTPATIENT
Start: 2023-03-06 | End: 2023-03-10 | Stop reason: HOSPADM

## 2023-03-06 RX ORDER — NYSTATIN 100000 [USP'U]/G
POWDER TOPICAL
COMMUNITY
Start: 2023-02-13

## 2023-03-06 RX ORDER — SODIUM CHLORIDE 0.9 % (FLUSH) 0.9 %
5-40 SYRINGE (ML) INJECTION EVERY 8 HOURS
Status: DISCONTINUED | OUTPATIENT
Start: 2023-03-06 | End: 2023-03-10 | Stop reason: HOSPADM

## 2023-03-06 RX ORDER — ACETAMINOPHEN 650 MG/1
650 SUPPOSITORY RECTAL
Status: DISCONTINUED | OUTPATIENT
Start: 2023-03-06 | End: 2023-03-06

## 2023-03-06 RX ORDER — CITALOPRAM 40 MG/1
40 TABLET, FILM COATED ORAL DAILY
COMMUNITY
Start: 2023-02-07

## 2023-03-06 RX ORDER — LORAZEPAM 2 MG/ML
0.5 INJECTION INTRAMUSCULAR
Status: DISCONTINUED | OUTPATIENT
Start: 2023-03-06 | End: 2023-03-07

## 2023-03-06 RX ORDER — DEXTROSE MONOHYDRATE 50 MG/ML
75 INJECTION, SOLUTION INTRAVENOUS CONTINUOUS
Status: DISCONTINUED | OUTPATIENT
Start: 2023-03-06 | End: 2023-03-07

## 2023-03-06 RX ORDER — ACETAMINOPHEN 325 MG/1
650 TABLET ORAL
Status: DISCONTINUED | OUTPATIENT
Start: 2023-03-06 | End: 2023-03-06

## 2023-03-06 RX ORDER — VANCOMYCIN 2 GRAM/500 ML IN 0.9 % SODIUM CHLORIDE INTRAVENOUS
2 ONCE
Status: COMPLETED | OUTPATIENT
Start: 2023-03-06 | End: 2023-03-06

## 2023-03-06 RX ORDER — DEXTROSE MONOHYDRATE AND SODIUM CHLORIDE 5; .45 G/100ML; G/100ML
75 INJECTION, SOLUTION INTRAVENOUS CONTINUOUS
Status: DISCONTINUED | OUTPATIENT
Start: 2023-03-06 | End: 2023-03-06

## 2023-03-06 RX ORDER — ALBUTEROL SULFATE 90 UG/1
2 AEROSOL, METERED RESPIRATORY (INHALATION)
COMMUNITY
Start: 2023-02-28

## 2023-03-06 RX ORDER — POTASSIUM CHLORIDE 29.8 MG/ML
20 INJECTION INTRAVENOUS
Status: COMPLETED | OUTPATIENT
Start: 2023-03-06 | End: 2023-03-06

## 2023-03-06 RX ORDER — POTASSIUM CHLORIDE 7.45 MG/ML
10 INJECTION INTRAVENOUS ONCE
Status: COMPLETED | OUTPATIENT
Start: 2023-03-06 | End: 2023-03-07

## 2023-03-06 RX ORDER — NYSTATIN 100000 [USP'U]/G
POWDER TOPICAL EVERY 12 HOURS
Status: DISCONTINUED | OUTPATIENT
Start: 2023-03-06 | End: 2023-03-10 | Stop reason: HOSPADM

## 2023-03-06 RX ORDER — POLYETHYLENE GLYCOL 3350 17 G/17G
17 POWDER, FOR SOLUTION ORAL DAILY PRN
Status: DISCONTINUED | OUTPATIENT
Start: 2023-03-06 | End: 2023-03-10 | Stop reason: HOSPADM

## 2023-03-06 RX ORDER — ENOXAPARIN SODIUM 100 MG/ML
30 INJECTION SUBCUTANEOUS DAILY
Status: DISCONTINUED | OUTPATIENT
Start: 2023-03-06 | End: 2023-03-07

## 2023-03-06 RX ORDER — SODIUM CHLORIDE 450 MG/100ML
125 INJECTION, SOLUTION INTRAVENOUS CONTINUOUS
Status: DISCONTINUED | OUTPATIENT
Start: 2023-03-06 | End: 2023-03-06

## 2023-03-06 RX ORDER — DEXTROSE MONOHYDRATE 100 MG/ML
0-250 INJECTION, SOLUTION INTRAVENOUS AS NEEDED
Status: DISCONTINUED | OUTPATIENT
Start: 2023-03-06 | End: 2023-03-07

## 2023-03-06 RX ORDER — BALSAM PERU/CASTOR OIL
OINTMENT (GRAM) TOPICAL 2 TIMES DAILY
Status: DISCONTINUED | OUTPATIENT
Start: 2023-03-06 | End: 2023-03-10 | Stop reason: HOSPADM

## 2023-03-06 RX ORDER — POTASSIUM CHLORIDE 29.8 MG/ML
20 INJECTION INTRAVENOUS ONCE
Status: COMPLETED | OUTPATIENT
Start: 2023-03-06 | End: 2023-03-07

## 2023-03-06 RX ORDER — METOCLOPRAMIDE HYDROCHLORIDE 5 MG/ML
5 INJECTION INTRAMUSCULAR; INTRAVENOUS 2 TIMES DAILY
Status: DISCONTINUED | OUTPATIENT
Start: 2023-03-06 | End: 2023-03-07

## 2023-03-06 RX ORDER — POTASSIUM CHLORIDE 7.45 MG/ML
10 INJECTION INTRAVENOUS
Status: DISCONTINUED | OUTPATIENT
Start: 2023-03-06 | End: 2023-03-06

## 2023-03-06 RX ORDER — FAMOTIDINE 40 MG/1
40 TABLET, FILM COATED ORAL DAILY
COMMUNITY
Start: 2023-01-27

## 2023-03-06 RX ORDER — IBUPROFEN 200 MG
4 TABLET ORAL AS NEEDED
Status: DISCONTINUED | OUTPATIENT
Start: 2023-03-06 | End: 2023-03-07 | Stop reason: SDUPTHER

## 2023-03-06 RX ORDER — INSULIN LISPRO 100 [IU]/ML
INJECTION, SOLUTION INTRAVENOUS; SUBCUTANEOUS
Status: DISCONTINUED | OUTPATIENT
Start: 2023-03-06 | End: 2023-03-07

## 2023-03-06 RX ORDER — SODIUM BICARBONATE 1 MEQ/ML
50 SYRINGE (ML) INTRAVENOUS ONCE
Status: COMPLETED | OUTPATIENT
Start: 2023-03-06 | End: 2023-03-06

## 2023-03-06 RX ORDER — BUTALBITAL, ACETAMINOPHEN AND CAFFEINE 50; 325; 40 MG/1; MG/1; MG/1
1 TABLET ORAL
COMMUNITY
Start: 2023-02-13

## 2023-03-06 RX ORDER — ACETAMINOPHEN 325 MG/1
650 TABLET ORAL
Status: DISCONTINUED | OUTPATIENT
Start: 2023-03-06 | End: 2023-03-10 | Stop reason: HOSPADM

## 2023-03-06 RX ORDER — SODIUM CHLORIDE 0.9 % (FLUSH) 0.9 %
5-40 SYRINGE (ML) INJECTION AS NEEDED
Status: DISCONTINUED | OUTPATIENT
Start: 2023-03-06 | End: 2023-03-10 | Stop reason: HOSPADM

## 2023-03-06 RX ADMIN — POTASSIUM CHLORIDE 20 MEQ: 29.8 INJECTION, SOLUTION INTRAVENOUS at 15:23

## 2023-03-06 RX ADMIN — CASTOR OIL AND BALSAM, PERU: 788; 87 OINTMENT TOPICAL at 12:58

## 2023-03-06 RX ADMIN — SODIUM CHLORIDE, PRESERVATIVE FREE 10 ML: 5 INJECTION INTRAVENOUS at 06:09

## 2023-03-06 RX ADMIN — DEXTROSE AND SODIUM CHLORIDE 50 ML/HR: 5; 450 INJECTION, SOLUTION INTRAVENOUS at 14:48

## 2023-03-06 RX ADMIN — Medication 10 UNITS: at 00:32

## 2023-03-06 RX ADMIN — LORAZEPAM 0.5 MG: 2 INJECTION INTRAMUSCULAR; INTRAVENOUS at 15:18

## 2023-03-06 RX ADMIN — PIPERACILLIN AND TAZOBACTAM 3.38 G: 3; .375 INJECTION, POWDER, LYOPHILIZED, FOR SOLUTION INTRAVENOUS at 09:31

## 2023-03-06 RX ADMIN — VANCOMYCIN HYDROCHLORIDE 2000 MG: 10 INJECTION, POWDER, LYOPHILIZED, FOR SOLUTION INTRAVENOUS at 03:17

## 2023-03-06 RX ADMIN — DOXYCYCLINE 100 MG: 100 INJECTION, POWDER, LYOPHILIZED, FOR SOLUTION INTRAVENOUS at 20:24

## 2023-03-06 RX ADMIN — SODIUM CHLORIDE, POTASSIUM CHLORIDE, SODIUM LACTATE AND CALCIUM CHLORIDE 500 ML: 600; 310; 30; 20 INJECTION, SOLUTION INTRAVENOUS at 11:18

## 2023-03-06 RX ADMIN — DOXYCYCLINE 100 MG: 100 INJECTION, POWDER, LYOPHILIZED, FOR SOLUTION INTRAVENOUS at 12:43

## 2023-03-06 RX ADMIN — METOCLOPRAMIDE 5 MG: 5 INJECTION, SOLUTION INTRAMUSCULAR; INTRAVENOUS at 17:05

## 2023-03-06 RX ADMIN — POTASSIUM CHLORIDE 20 MEQ: 29.8 INJECTION, SOLUTION INTRAVENOUS at 08:20

## 2023-03-06 RX ADMIN — NYSTATIN: 100000 POWDER TOPICAL at 20:28

## 2023-03-06 RX ADMIN — PIPERACILLIN AND TAZOBACTAM 3.38 G: 3; .375 INJECTION, POWDER, FOR SOLUTION INTRAVENOUS at 01:23

## 2023-03-06 RX ADMIN — POTASSIUM CHLORIDE 20 MEQ: 29.8 INJECTION, SOLUTION INTRAVENOUS at 06:08

## 2023-03-06 RX ADMIN — Medication 1 AMPULE: at 08:35

## 2023-03-06 RX ADMIN — SODIUM CHLORIDE 36.9 UNITS/HR: 9 INJECTION, SOLUTION INTRAVENOUS at 13:26

## 2023-03-06 RX ADMIN — Medication 1 AMPULE: at 20:18

## 2023-03-06 RX ADMIN — POTASSIUM CHLORIDE 10 MEQ: 7.46 INJECTION, SOLUTION INTRAVENOUS at 13:34

## 2023-03-06 RX ADMIN — SODIUM CHLORIDE 11.6 UNITS/HR: 9 INJECTION, SOLUTION INTRAVENOUS at 14:43

## 2023-03-06 RX ADMIN — ENOXAPARIN SODIUM 30 MG: 100 INJECTION SUBCUTANEOUS at 08:36

## 2023-03-06 RX ADMIN — SODIUM CHLORIDE, PRESERVATIVE FREE 10 ML: 5 INJECTION INTRAVENOUS at 21:59

## 2023-03-06 RX ADMIN — SODIUM CHLORIDE 125 ML/HR: 4.5 INJECTION, SOLUTION INTRAVENOUS at 01:35

## 2023-03-06 RX ADMIN — DEXTROSE MONOHYDRATE 50 ML/HR: 50 INJECTION, SOLUTION INTRAVENOUS at 19:31

## 2023-03-06 RX ADMIN — POTASSIUM CHLORIDE 10 MEQ: 7.46 INJECTION, SOLUTION INTRAVENOUS at 17:49

## 2023-03-06 RX ADMIN — SODIUM BICARBONATE 50 MEQ: 84 INJECTION, SOLUTION INTRAVENOUS at 00:47

## 2023-03-06 RX ADMIN — ACETAMINOPHEN 650 MG: 650 SUPPOSITORY RECTAL at 18:16

## 2023-03-06 RX ADMIN — ACETAMINOPHEN 650 MG: 650 SUPPOSITORY RECTAL at 22:27

## 2023-03-06 RX ADMIN — LORAZEPAM 0.5 MG: 2 INJECTION INTRAMUSCULAR; INTRAVENOUS at 22:50

## 2023-03-06 RX ADMIN — SODIUM CHLORIDE, POTASSIUM CHLORIDE, SODIUM LACTATE AND CALCIUM CHLORIDE 500 ML: 600; 310; 30; 20 INJECTION, SOLUTION INTRAVENOUS at 09:37

## 2023-03-06 RX ADMIN — SODIUM CHLORIDE 16.2 UNITS/HR: 9 INJECTION, SOLUTION INTRAVENOUS at 02:28

## 2023-03-06 RX ADMIN — SODIUM CHLORIDE 125 ML/HR: 4.5 INJECTION, SOLUTION INTRAVENOUS at 11:48

## 2023-03-06 RX ADMIN — POTASSIUM PHOSPHATE, MONOBASIC AND POTASSIUM PHOSPHATE, DIBASIC: 224; 236 INJECTION, SOLUTION, CONCENTRATE INTRAVENOUS at 07:51

## 2023-03-06 RX ADMIN — NYSTATIN: 100000 POWDER TOPICAL at 16:51

## 2023-03-06 RX ADMIN — SODIUM CHLORIDE 10.8 UNITS/HR: 9 INJECTION, SOLUTION INTRAVENOUS at 01:10

## 2023-03-06 RX ADMIN — PIPERACILLIN AND TAZOBACTAM 3.38 G: 3; .375 INJECTION, POWDER, LYOPHILIZED, FOR SOLUTION INTRAVENOUS at 17:30

## 2023-03-06 RX ADMIN — CASTOR OIL AND BALSAM, PERU: 788; 87 OINTMENT TOPICAL at 20:19

## 2023-03-06 RX ADMIN — SODIUM CHLORIDE, PRESERVATIVE FREE 10 ML: 5 INJECTION INTRAVENOUS at 13:20

## 2023-03-06 RX ADMIN — POTASSIUM CHLORIDE 20 MEQ: 29.8 INJECTION, SOLUTION INTRAVENOUS at 05:05

## 2023-03-06 RX ADMIN — SODIUM CHLORIDE 27.1 UNITS/HR: 9 INJECTION, SOLUTION INTRAVENOUS at 10:14

## 2023-03-06 RX ADMIN — SODIUM CHLORIDE 1000 ML: 9 INJECTION, SOLUTION INTRAVENOUS at 01:39

## 2023-03-06 NOTE — ED PROVIDER NOTES
EMERGENCY DEPARTMENT HISTORY AND PHYSICAL EXAM            Please note that this dictation was completed with the assistance of \"Dragon\", the computer voice recognition software. Quite often unanticipated grammatical, syntax, homophones, and other interpretive errors are inadvertently transcribed by the computer software. Please disregard these errors and any errors that have escaped final proofreading. Thank you. Date of Evaluation: 03/06/23  Patient: Renee Montalvo  Patient Age and Sex: 47 y.o. female   MRN: [id number]  CSN: [id number]  PCP: Mohamud Montes MD    History of Present Illness     Chief Complaint   Patient presents with    Diabetes     Patient's family states he believes that she is having a problem with diabetes; pt is confused in triage, pt wears an insulin pump and removed it this morning per family. Pt's BG in triage reports HI on glucometer. Altered mental status     History Provided By: Patient/family/EMS (if available)    HPI Limitations : Altered Mental Status    HPI: Renee Montalvo, 47 y.o. female with past medical history as documented below presents to the ED with c/o of AMS. Per significant other, pt has been confused all day. Last known normal last evening. Pt does wear an insulin pump and removed it this morning. BS > 600 in triage. Pt does have hx of xanax and MS contin usage daily. No concerns for overdose. Pt denies any other exacerbating or ameliorating factors. There are no other complaints, changes or physical findings pertinent to the HPI at this time. Nursing Notes were all reviewed and agreed with or any disagreements were addressed in the HPI.     Past History   Past Medical History:  Past Medical History:   Diagnosis Date    Achilles tendon rupture     along with torn right patellar/femoral ligament    Arthritis     rt. foot    Chronic kidney disease     Chronic pain     abdominal pain    Diabetes (HCC)     IDDM on insulin pump and sensor    DM type 1 (diabetes mellitus, type 1) [facility])     age 25    Endometriosis     s/p ex-lap 4x    Gastric ulcer     GERD (gastroesophageal reflux disease)     Herpes     Other and unspecified hyperlipidemia     Panic attacks     Psychiatric disorder     anxiety, panic attacks    PTSD (post-traumatic stress disorder)     Unspecified essential hypertension        Past Surgical History:  Past Surgical History:   Procedure Laterality Date    HX COLONOSCOPY      HX GYN      2 d&c's    HX GYN      laparoscopies x5 for endometriosis    HX GYN      mirena in place    HX ORTHOPAEDIC  2002    achiles tendon repair,talus repair    HX ORTHOPAEDIC      injections x2 to right shoulder    HX ORTHOPAEDIC Left 02/07/2019    3rd trigger finger release    LA CARDIAC SURG PROCEDURE UNLIST         Family History:   Family history reviewed and was non-contributory, unless specified below:  Family History   Problem Relation Age of Onset    Diabetes Mother         diet controlled    Diabetes Father         R foot amupation    Liver Disease Father     Heart Disease Paternal Uncle         MI in his 46s    Stroke Neg Hx        Social History:  Social History     Tobacco Use    Smoking status: Every Day     Packs/day: 0.50     Years: 28.00     Pack years: 14.00     Types: Cigarettes    Smokeless tobacco: Current    Tobacco comments:     Decreased to a half a pack    Substance Use Topics    Alcohol use: Not Currently     Comment: a beer every few months    Drug use: No     Types: OTC, Prescription       Allergies: Allergies   Allergen Reactions    Metolazone Other (comments)     Caused hyponatremia    Zocor [Simvastatin] Myalgia    Lisinopril Cough    Medrol [Methylprednisolone] Other (comments)     Caused severe hyperglycemia with the medrol pack       Current Medications:  No current facility-administered medications on file prior to encounter.      Current Outpatient Medications on File Prior to Encounter   Medication Sig Dispense Refill    Omeprazole delayed release (PRILOSEC D/R) 20 mg tablet Take 20 mg by mouth daily. metoclopramide HCl (REGLAN) 5 mg tablet Take 1 Tablet by mouth two (2) times a day. 60 Tablet 11    HumaLOG U-100 Insulin 100 unit/mL injection USE AS DIRECTED FOR INSULIN PUMP.  UNITS PER DAY. DX E10.65--replaces novolog 90 mL 3    carvediloL (COREG) 6.25 mg tablet TAKE ONE TABLET BY MOUTH DAILY 90 Tablet 3    cefpodoxime (VANTIN) 100 mg tablet daily. glucagon (Glucagon Emergency Kit, human,) 1 mg solr USE AS DIRECTED IN KIT INSTRUCTIONS 2 Each 11    gabapentin (NEURONTIN) 600 mg tablet Take 1 Tablet by mouth four (4) times daily. Max Daily Amount: 2,400 mg. Replaces the 300 mg caps 360 Tablet 1    buPROPion SR (WELLBUTRIN SR) 100 mg SR tablet daily. spironolactone (ALDACTONE) 25 mg tablet 50 mg daily. naloxone (Narcan) 4 mg/actuation nasal spray Use 1 spray intranasally, then discard. Repeat with new spray every 2 min as needed for opioid overdose symptoms, alternating nostrils. 1 Each 0    BD Insulin Syringe Ultra-Fine 0.5 mL 31 gauge x 5/16\" syrg USE ONE SYRINGE - FOUR TIMES A  Each 11    mirtazapine (REMERON) 7.5 mg tablet Take 15 mg by mouth daily. morphine CR (MS CONTIN) 100 mg CR tablet Take 1 Tablet by mouth two (2) times a day. [employer] Use to test up to 8 times per day. DX Code: 10.65--delete prescription for softclix sent in error (Patient not taking: Reported on 12/12/2022) 800 Each 3    [employer]-Acronis Guide test strips strip Use as directed to test up to 8 times per day. Dx E10.65 (Patient not taking: Reported on 12/12/2022) 250 Strip 11    aspirin delayed-release 81 mg tablet Take 81 mg by mouth daily. diclofenac (VOLTAREN) 1 % gel Apply 2 g to affected area four (4) times daily as needed for Pain. RT KNEE      oxyCODONE IR (ROXICODONE) 5 mg immediate release tablet Take 5 mg by mouth every four (4) hours as needed for Pain.       rosuvastatin (CRESTOR) 40 mg tablet Take 40 mg by mouth daily. brexpiprazole (REXULTI) 0.5 mg tab tablet Take 1 mg by mouth two (2) times a day. busPIRone (BUSPAR) 10 mg tablet Take 20 mg by mouth two (2) times a day. ALPRAZolam (XANAX) 1 mg tablet Take 1 mg by mouth three (3) times daily as needed. 1.5 tabs TID as needed      fluticasone propionate (FLONASE) 50 mcg/actuation nasal spray 2 Sprays by Both Nostrils route daily as needed for Rhinitis. 16 g 3    bumetanide (BUMEX) 2 mg tablet Take 2 mg by mouth two (2) times a day. esomeprazole (NEXIUM) 40 mg capsule TAKE ONE CAPSULE BY MOUTH DAILY (Patient taking differently: 80 mg daily.) 30 Cap 11    insulin pump (PATIENT SUPPLIED) misc by SubCUTAneous route as needed. levonorgestreL (MIRENA) 20 mcg/24 hours (7 yrs) 52 mg IUD 1 Device by IntraUTERine route once. Review of Systems   A complete ROS was reviewed by me today and all other systems negative, unless otherwise specified below:  Review of Systems   Unable to perform ROS: Mental status change   Physical Exam   Patient Vitals for the past 24 hrs:   Temp Pulse Resp BP SpO2   03/06/23 0421 98.8 °F (37.1 °C) (!) 121 (!) 35 (!) 145/55 91 %   03/06/23 0400 -- (!) 121 -- -- --   03/06/23 0328 -- (!) 118 (!) 38 120/76 97 %   03/06/23 0313 -- (!) 112 (!) 35 (!) 131/56 98 %   03/06/23 0240 98.1 °F (36.7 °C) -- -- -- --   03/06/23 0238 -- (!) 109 (!) 33 (!) 147/58 96 %   03/06/23 0153 -- (!) 125 (!) 35 124/62 94 %   03/06/23 0108 -- (!) 118 (!) 35 (!) 113/92 96 %   03/06/23 0038 -- (!) 129 (!) 38 (!) 98/56 99 %   03/05/23 2338 -- (!) 127 26 (!) 104/51 91 %   03/05/23 2201 -- (!) 128 (!) 38 (!) 125/50 98 %   03/05/23 2130 98.1 °F (36.7 °C) (!) 134 26 (!) 135/54 97 %       Physical Exam  Vitals and nursing note reviewed. Constitutional:       General: She is in acute distress. Appearance: She is ill-appearing, toxic-appearing and diaphoretic. HENT:      Head: Normocephalic and atraumatic.       Mouth/Throat:      Pharynx: No oropharyngeal exudate. Eyes:      Conjunctiva/sclera: Conjunctivae normal.      Pupils: Pupils are equal, round, and reactive to light. Cardiovascular:      Rate and Rhythm: Regular rhythm. Tachycardia present. Heart sounds: Normal heart sounds. No murmur heard. No friction rub. No gallop. Pulmonary:      Effort: Respiratory distress present. Breath sounds: Normal breath sounds. No wheezing or rales. Chest:      Chest wall: No tenderness. Abdominal:      General: Bowel sounds are normal. There is no distension. Palpations: Abdomen is soft. There is no mass. Tenderness: There is no abdominal tenderness. There is no guarding or rebound. Musculoskeletal:         General: No tenderness or deformity. Normal range of motion. Cervical back: Normal range of motion. Skin:     General: Skin is warm. Findings: No rash. Neurological:      Mental Status: She is lethargic, disoriented and confused. Cranial Nerves: No cranial nerve deficit. Motor: No abnormal muscle tone. Coordination: Coordination normal.      Deep Tendon Reflexes: Reflexes normal.     Diagnostic Studies     LABORATORY RESULTS:  I have personally reviewed and interpreted all available laboratory results.    Recent Results (from the past 24 hour(s))   GLUCOSE, POC    Collection Time: 03/05/23  9:30 PM   Result Value Ref Range    Glucose (POC) >600 (HH) 65 - 117 mg/dL    Performed by Tess Sexton [facility]    GLUCOSE, POC    Collection Time: 03/05/23  9:51 PM   Result Value Ref Range    Glucose (POC) >600 (HH) 65 - 117 mg/dL    Performed by AVELINA Dee [facility]    CBC WITH AUTOMATED DIFF    Collection Time: 03/05/23 10:38 PM   Result Value Ref Range    WBC 21.0 (H) 3.6 - 11.0 K/uL    RBC 4.18 3.80 - 5.20 M/uL    HGB 11.2 (L) 11.5 - 16.0 g/dL    HCT 40.5 35.0 - 47.0 %    MCV 96.9 80.0 - 99.0 FL    MCH 26.8 26.0 - 34.0 PG    MCHC 27.7 (L) 30.0 - 36.5 g/dL    RDW 17.8 (H) 11.5 - 14.5 %    PLATELET 993 611 - 400 K/uL    MPV 12.1 8.9 - 12.9 FL    NRBC 0.0 0  WBC    ABSOLUTE NRBC 0.00 0.00 - 0.01 K/uL    NEUTROPHILS 82 (H) 32 - 75 %    LYMPHOCYTES 5 (L) 12 - 49 %    MONOCYTES 11 5 - 13 %    EOSINOPHILS 0 0 - 7 %    BASOPHILS 0 0 - 1 %    IMMATURE GRANULOCYTES 2 (H) 0.0 - 0.5 %    ABS. NEUTROPHILS 17.2 (H) 1.8 - 8.0 K/UL    ABS. LYMPHOCYTES 1.1 0.8 - 3.5 K/UL    ABS. MONOCYTES 2.3 (H) 0.0 - 1.0 K/UL    ABS. EOSINOPHILS 0.0 0.0 - 0.4 K/UL    ABS. BASOPHILS 0.0 0.0 - 0.1 K/UL    ABS. IMM. GRANS. 0.4 (H) 0.00 - 0.04 K/UL    DF SMEAR SCANNED      RBC COMMENTS AMY CELLS  PRESENT        RBC COMMENTS ANISOCYTOSIS  1+        WBC COMMENTS VACUOLATED POLYS     METABOLIC PANEL, COMPREHENSIVE    Collection Time: 03/05/23 10:38 PM   Result Value Ref Range    Sodium 130 (L) 136 - 145 mmol/L    Potassium 4.6 3.5 - 5.1 mmol/L    Chloride 88 (L) 97 - 108 mmol/L    CO2 6 (LL) 21 - 32 mmol/L    Anion gap 36 (H) 5 - 15 mmol/L    Glucose 1,341 (HH) 65 - 100 mg/dL    BUN 41 (H) 6 - 20 MG/DL    Creatinine 2.48 (H) 0.55 - 1.02 MG/DL    BUN/Creatinine ratio 17 12 - 20      eGFR 23 (L) >60 ml/min/1.73m2    Calcium 8.7 8.5 - 10.1 MG/DL    Bilirubin, total 0.5 0.2 - 1.0 MG/DL    ALT (SGPT) 42 12 - 78 U/L    AST (SGOT) 38 (H) 15 - 37 U/L    Alk.  phosphatase 107 45 - 117 U/L    Protein, total 7.0 6.4 - 8.2 g/dL    Albumin 3.4 (L) 3.5 - 5.0 g/dL    Globulin 3.6 2.0 - 4.0 g/dL    A-G Ratio 0.9 (L) 1.1 - 2.2     TROPONIN-HIGH SENSITIVITY    Collection Time: 03/05/23 10:38 PM   Result Value Ref Range    Troponin-High Sensitivity 111 (H) 0 - 51 ng/L   COVID-19 RAPID TEST    Collection Time: 03/05/23 10:38 PM   Result Value Ref Range    Specimen source Nasopharyngeal      COVID-19 rapid test Not detected NOTD     INFLUENZA A+B VIRAL AGS    Collection Time: 03/05/23 10:38 PM   Result Value Ref Range    Influenza A Antigen Negative NEG      Influenza B Antigen Negative NEG     POC LACTIC ACID    Collection Time: 03/05/23 11:10 PM   Result Value Ref Range    Lactic Acid (POC) 6.04 (HH) 0.40 - 2.00 mmol/L   BLOOD GAS,CHEM8,LACTIC ACID POC    Collection Time: 03/06/23 12:16 AM   Result Value Ref Range    pH, venous (POC) 7.11 (LL) 7.32 - 7.42      pCO2, venous (POC) 20.8 (L) 41 - 51 MMHG    pO2, venous (POC) 84 (H) 25 - 40 mmHg    BICARBONATE 7 mmol/L    Base deficit (POC) 21.1 mmol/L    O2 SAT 92 %    Sodium,  (L) 136 - 145 MMOL/L    Potassium, POC 4.5 3.5 - 5.5 MMOL/L    Chloride, POC 98 (L) 100 - 108 MMOL/L    CO2, POC 6 (LL) 19 - 24 MMOL/L    Anion gap, POC 22 (H) 10 - 20      Glucose, POC >700 (HH) 74 - 106 MG/DL    Creatinine, POC 2.3 (H) 0.6 - 1.3 MG/DL    eGFR (POC) 25 (L) >60 ml/min/1.73m2    Calcium, ionized (POC) 0.90 (L) 1.12 - 1.32 mmol/L    Lactic Acid (POC) 3.85 (HH) 0.40 - 2.00 mmol/L    Sample source VENOUS BLOOD      Critical value read back [facility]    GLUCOSTABILIZER    Collection Time: 03/06/23  1:19 AM   Result Value Ref Range    Glucose 600 mg/dL    Insulin order 10.8 units/hour    Insulin adminstered 10.8 units/hour    Multiplier 0.020     Low target 150 mg/dL    High target 200 mg/dL    D50 order 0.0 ml    D50 administered 0.00 ml    Minutes until next BG 60 min    Order initials EL     Administered initials EL    GLUCOSE, POC    Collection Time: 03/06/23  2:24 AM   Result Value Ref Range    Glucose (POC) >600 (HH) 65 - 117 mg/dL    Performed by Ken Camara (KATERINA RN)    GLUCOSTABILIZER    Collection Time: 03/06/23  2:26 AM   Result Value Ref Range    Glucose 601 mg/dL    Insulin order 16.2 units/hour    Insulin adminstered 16.2 units/hour    Multiplier 0.030     Low target 150 mg/dL    High target 200 mg/dL    D50 order 0.0 ml    D50 administered 0.00 ml    Minutes until next BG 60 min    Order initials klk     Administered initials klk    METABOLIC PANEL, BASIC    Collection Time: 03/06/23  2:41 AM   Result Value Ref Range    Sodium 138 136 - 145 mmol/L    Potassium 2.9 (L) 3.5 - 5.1 mmol/L    Chloride 99 97 - 108 mmol/L    CO2 8 (LL) 21 - 32 mmol/L    Anion gap 31 (H) 5 - 15 mmol/L    Glucose 1,076 (HH) 65 - 100 mg/dL    BUN 46 (H) 6 - 20 MG/DL    Creatinine 2.77 (H) 0.55 - 1.02 MG/DL    BUN/Creatinine ratio 17 12 - 20      eGFR 20 (L) >60 ml/min/1.73m2    Calcium 7.6 (L) 8.5 - 10.1 MG/DL   MAGNESIUM    Collection Time: 03/06/23  2:41 AM   Result Value Ref Range    Magnesium 2.5 (H) 1.6 - 2.4 mg/dL   PHOSPHORUS    Collection Time: 03/06/23  2:41 AM   Result Value Ref Range    Phosphorus 4.7 2.6 - 4.7 MG/DL   AMMONIA    Collection Time: 03/06/23  2:41 AM   Result Value Ref Range    Ammonia, plasma 51 (H) <32 UMOL/L   GLUCOSE, POC    Collection Time: 03/06/23  3:35 AM   Result Value Ref Range    Glucose (POC) >600 (HH) 65 - 117 mg/dL    Performed by Og Mendez (KATERINA RN)    GLUCOSTABILIZER    Collection Time: 03/06/23  3:37 AM   Result Value Ref Range    Glucose 601 mg/dL    Insulin order 21.6 units/hour    Insulin adminstered 21.6 units/hour    Multiplier 0.040     Low target 150 mg/dL    High target 200 mg/dL    D50 order 0.0 ml    D50 administered 0.00 ml    Minutes until next BG 60 min    Order initials EML     Administered initials EML    GLUCOSE, POC    Collection Time: 03/06/23  4:41 AM   Result Value Ref Range    Glucose (POC) >600 (HH) 65 - 117 mg/dL    Performed by Blanca Thomas RN    GLUCOSTABILIZER    Collection Time: 03/06/23  4:43 AM   Result Value Ref Range    Glucose 601 mg/dL    Insulin order 27.1 units/hour    Insulin adminstered 27.1 units/hour    Multiplier 0.050     Low target 150 mg/dL    High target 200 mg/dL    D50 order 0.0 ml    D50 administered 0.00 ml    Minutes until next BG 60 min    Order initials AF     Administered initials AF    CBC WITH AUTOMATED DIFF    Collection Time: 03/06/23  5:02 AM   Result Value Ref Range    WBC 20.2 (H) 3.6 - 11.0 K/uL    RBC 3.86 3.80 - 5.20 M/uL    HGB 10.3 (L) 11.5 - 16.0 g/dL    HCT 33.2 (L) 35.0 - 47.0 %    MCV 86.0 80.0 - 99.0 FL    MCH 26.7 26.0 - 34.0 PG    MCHC 31.0 30.0 - 36.5 g/dL    RDW 16.7 (H) 11.5 - 14.5 %    PLATELET 303 278 - 247 K/uL    MPV 10.6 8.9 - 12.9 FL    NRBC 0.0 0  WBC    ABSOLUTE NRBC 0.00 0.00 - 0.01 K/uL    NEUTROPHILS 80 (H) 32 - 75 %    LYMPHOCYTES 12 12 - 49 %    MONOCYTES 7 5 - 13 %    EOSINOPHILS 0 0 - 7 %    BASOPHILS 0 0 - 1 %    IMMATURE GRANULOCYTES 1 (H) 0.0 - 0.5 %    ABS. NEUTROPHILS 16.2 (H) 1.8 - 8.0 K/UL    ABS. LYMPHOCYTES 2.4 0.8 - 3.5 K/UL    ABS. MONOCYTES 1.4 (H) 0.0 - 1.0 K/UL    ABS. EOSINOPHILS 0.0 0.0 - 0.4 K/UL    ABS. BASOPHILS 0.1 0.0 - 0.1 K/UL    ABS. IMM.  GRANS. 0.2 (H) 0.00 - 0.04 K/UL    DF AUTOMATED     MAGNESIUM    Collection Time: 03/06/23  5:02 AM   Result Value Ref Range    Magnesium 2.3 1.6 - 2.4 mg/dL   PHOSPHORUS    Collection Time: 03/06/23  5:02 AM   Result Value Ref Range    Phosphorus 1.6 (L) 2.6 - 4.7 MG/DL   URINALYSIS W/ REFLEX CULTURE    Collection Time: 03/06/23  5:02 AM    Specimen: Urine   Result Value Ref Range    Color YELLOW/STRAW      Appearance CLEAR CLEAR      Specific gravity 1.026      pH (UA) 5.0 5.0 - 8.0      Protein TRACE (A) NEG mg/dL    Glucose >1,000 (A) NEG mg/dL    Ketone 80 (A) NEG mg/dL    Bilirubin Negative NEG      Blood MODERATE (A) NEG      Urobilinogen 0.2 0.2 - 1.0 EU/dL    Nitrites Negative NEG      Leukocyte Esterase Negative NEG      WBC 0-4 0 - 4 /hpf    RBC 0-5 0 - 5 /hpf    Epithelial cells MODERATE (A) FEW /lpf    Bacteria Negative NEG /hpf    UA:UC IF INDICATED CULTURE NOT INDICATED BY UA RESULT CNI      Amorphous Crystals FEW (A) NEG      Hyaline cast 0-2 0 - 5 /lpf    Granular cast 0-2 (A) NEG /lpf   METABOLIC PANEL, BASIC    Collection Time: 03/06/23  5:02 AM   Result Value Ref Range    Sodium 138 136 - 145 mmol/L    Potassium 2.8 (L) 3.5 - 5.1 mmol/L    Chloride 103 97 - 108 mmol/L    CO2 11 (LL) 21 - 32 mmol/L    Anion gap 24 (H) 5 - 15 mmol/L    Glucose 815 (HH) 65 - 100 mg/dL    BUN 48 (H) 6 - 20 MG/DL    Creatinine 2.77 (H) 0.55 - 1.02 MG/DL    BUN/Creatinine ratio 17 12 - 20      eGFR 20 (L) >60 ml/min/1.73m2    Calcium 7.8 (L) 8.5 - 10.1 MG/DL       RADIOLOGY RESULTS:  I have personally reviewed and interpreted all available imaging studies and agree with radiology interpretation. XR CHEST PORT   Final Result      No pneumothorax following left IJ catheter placement. Decreased perihilar and   bibasilar opacities. XR CHEST PORT   Final Result      Increased bilateral perihilar and decreased bibasilar opacities compared to   6/30/2022 may represent atelectasis, edema, infection, or aspiration. CT Results  (Last 48 hours)      None          CXR Results  (Last 48 hours)                 03/06/23 0014  XR CHEST PORT Final result    Impression:      No pneumothorax following left IJ catheter placement. Decreased perihilar and   bibasilar opacities. Narrative:  EXAM:  XR CHEST PORT       INDICATION: Left IJ catheter placement. COMPARISON: 3/5/2023 at 2152 hours       TECHNIQUE: Portable AP semiupright chest view at 0009 hours       FINDINGS: A left IJ catheter terminates in profile with the SVC. The   cardiomediastinal and hilar contours are within normal limits. Perihilar and bibasilar opacities are decreased. There is no pleural effusion   There is no pneumothorax. A skin fold overlies the left lung apex. The bones and   upper abdomen are stable. 03/05/23 2202  XR CHEST PORT Final result    Impression:      Increased bilateral perihilar and decreased bibasilar opacities compared to   6/30/2022 may represent atelectasis, edema, infection, or aspiration. Narrative:  EXAM:  XR CHEST PORT       INDICATION: Altered mental status. COMPARISON: 6/30/2022       TECHNIQUE: Portable AP chest view at 2155 hours       FINDINGS: The cardiomediastinal contours are stable. The pulmonary vasculature   is within normal limits.         There are increased bilateral perihilar and decreased bibasilar opacities   compared to 6/30/2022. There is no pleural effusion or pneumothorax. The bones   and upper abdomen are stable. XR CHEST PORT   Final Result      No pneumothorax following left IJ catheter placement. Decreased perihilar and   bibasilar opacities. XR CHEST PORT   Final Result      Increased bilateral perihilar and decreased bibasilar opacities compared to   6/30/2022 may represent atelectasis, edema, infection, or aspiration. MEDICAL DECISION MAKING   I am the first and primary ED physician for this patient's ED visit today. I reviewed our EMR for any past records that may contribute to the patient's current condition, including their past medical, surgical, social and family history. This also includes their most recent ED visits, previous hospitalizations and prior diagnostic data. I have reviewed and summarized the most pertinent findings in my HPI and MDM.     Vital Signs Reviewed:  Patient Vitals for the past 24 hrs:   Temp Pulse Resp BP SpO2   03/06/23 0421 98.8 °F (37.1 °C) (!) 121 (!) 35 (!) 145/55 91 %   03/06/23 0400 -- (!) 121 -- -- --   03/06/23 0328 -- (!) 118 (!) 38 120/76 97 %   03/06/23 0313 -- (!) 112 (!) 35 (!) 131/56 98 %   03/06/23 0240 98.1 °F (36.7 °C) -- -- -- --   03/06/23 0238 -- (!) 109 (!) 33 (!) 147/58 96 %   03/06/23 0153 -- (!) 125 (!) 35 124/62 94 %   03/06/23 0108 -- (!) 118 (!) 35 (!) 113/92 96 %   03/06/23 0038 -- (!) 129 (!) 38 (!) 98/56 99 %   03/05/23 2338 -- (!) 127 26 (!) 104/51 91 %   03/05/23 2201 -- (!) 128 (!) 38 (!) 125/50 98 %   03/05/23 2130 98.1 °F (36.7 °C) (!) 134 26 (!) 135/54 97 %     Pulse Oximetry Analysis: 97% on RA with good pleth    Cardiac Monitor:   Rate: 134 bpm  The cardiac monitor revealed the following rhythm as interpreted by me: sinus tachycardia  Cardiac monitoring was ordered to monitor patient for signs of cardiac dysrhythmia, which they are at risk for based on their history and/or risk for cardiovascular disease and/or metabolic abnormalities. Records Reviewed: Nursing Notes, Old Medical Records, Previous electrocardiograms, Previous Radiology Studies and Previous Laboratory Studies, EMS reports    DIFFERENTIAL DIAGNOSIS AND MDM:  1) Patient presenting with altered mental status. Broad differential; please see resuscitation and critical care below. POC glucose was checked immediately upon arrival. DDx: medication toxicity, infection, anemia, electrolyte/metabolic anomaly, hypercapnea, stroke/bleed/mass, dehydration, illicit drug intoxication. Will obtain labwork, UA, EKG and CT imaging of the head, chest xray. Will consider adding toxicologic workup if history unclear or warrants further investigation of toxic source. Will continue to monitor and reassess for admission. 2) Patient presents with severe hyperglycemia. DDx: uncontrolled diabetes 2/2 noncompliance, infection, stressors. Will obtain labs to r/o DKA or other metabolic anomalies, treat with insulin and fluids.     Pertinent Chronic Medical Conditions or Prior Surgeries: Listed under PMH above and includes:  Past Medical History:   Diagnosis Date    Achilles tendon rupture     along with torn right patellar/femoral ligament    Arthritis     rt. foot    Chronic kidney disease     Chronic pain     abdominal pain    Diabetes (HCC)     IDDM on insulin pump and sensor    [facility]     age 25    Endometriosis     s/p ex-lap 4x    Gastric ulcer     GERD (gastroesophageal reflux disease)     Herpes     Other and unspecified hyperlipidemia     Panic attacks     Psychiatric disorder     anxiety, panic attacks    PTSD (post-traumatic stress disorder)     Unspecified essential hypertension        Review of Prior Records and External Documents:   reviewed: YES - I have reviewed the patient's controlled substance prescription history thru the Prescription Monitoring Program so that the prescription(s) for a controlled substance can be given.    Prior hospital discharge summaries and clinic notes reviewed: Reviewed most recent Endocrine clinic note 12/12/22. Pt seen by Dr. Monda Gaucher. Last admission [facility]/2022 at [facility] for cellulitis and received IV steroids. Most recent A1c was 10.7% in 12/2022    Independent History: An independent clinically history was obtained from family. Pt's significant other states pt pulled out her insulin pump this morning. Blood sugar has been > 600. Social Determinants of Health:  Patients evaluation and management were significantly impacted by social determinants of health. Social Determinants affecting Dx or Tx: None    TOBACCO COUNSELING:  Upon evaluation, pt expressed that they are a current tobacco user. For approximately 5 mins, pt has been counseled on the dangers of smoking and was encouraged to quit as soon as possible in order to decrease further risks to their health. Pt has conveyed their understanding of the risks involved should they continue to use tobacco products. Social History     Socioeconomic History    Marital status: SINGLE   Tobacco Use    Smoking status: Every Day     Packs/day: 0.50     Years: 28.00     Pack years: 14.00     Types: Cigarettes    Smokeless tobacco: Current    Tobacco comments:     Decreased to a half a pack    Substance and Sexual Activity    Alcohol use: Not Currently     Comment: a beer every few months    Drug use: No     Types: OTC, Prescription    Sexual activity: Yes     Partners: Male     ED Course: Progress Notes, Reevaluation, and Consults:  Initial assessment performed. I discussed presenting problems and concerns, and my formulated plan for today's visit with the patient and any available family members. I have encouraged them to ask questions as they arise throughout the visit.      ED Physician Orders:   Orders Placed This Encounter    COVID-19 RAPID TEST    CULTURE, BLOOD, PAIRED    XR CHEST PORT    XR CHEST PORT    CBC WITH AUTOMATED DIFF METABOLIC PANEL, COMPREHENSIVE    TROPONIN-HIGH SENSITIVITY    URINALYSIS W/ REFLEX CULTURE    VENOUS BLOOD GAS    INFLUENZA A+B VIRAL AGS    BETA-HYDROXYBUTYRATE    METABOLIC PANEL, BASIC    GLUCOSE, RANDOM    MAGNESIUM    PHOSPHORUS    METABOLIC PANEL, BASIC    VENOUS BLOOD GAS    CBC WITH AUTOMATED DIFF    MAGNESIUM    PHOSPHORUS    AMMONIA    VANCOMYCIN, RANDOM    URINALYSIS W/ REFLEX CULTURE    PROCALCITONIN    SODIUM, UR, RANDOM    METABOLIC PANEL, BASIC    DIET NPO    POC GLUCOSE    POC LACTIC ACID (If Available)    POC GLUCOSE    VITAL SIGNS - PER UNIT ROUTINE    STRICT I & O    WEIGH PATIENT    MEASURE HEIGHT    NOTIFY PROVIDER: SPECIFY Notify provider within one hour to start vasopressors if patient is unable to maintain a MAP of greater than or equal to 65 mmHg despite fluid resuscitation  ONE TIME STAT    NEUROLOGIC STATUS ASSESSMENT - PER UNIT ROUTINE    POC LACTIC ACID    INITIATE NURSING DYSPHAGIA SCREENING    NIH STROKE SCALE ASSESSMENT    NEUROLOGIC STATUS ASSESSMENT - PER UNIT ROUTINE    NOTIFY PROVIDER: VITAL SIGNS CHANGES    NOTIFY PROVIDER: SPECIFY *Serum pH less than 6.9 to obtain orders for intravenous bicarbonate *Serum potassium less than 3.5 mEq/L or greater than 5.2 mEq/L *Serum magnesium less than 1.5 mEq/L *Serum phosphorus less than 1.5 mEq/L *If insulin dri. .. NOTIFY PROVIDER: SPECIFY Notify provider: FOR DKA: When BG is within target range for at least 4 hours AND Anion gap less than 12 mEq/L on two consecutive basic metabolic panels (BMP), for orders to transition off the drip to subcutaneous basal in. .. NOTIFY PROVIDER: SPECIFY Notify provider: (Only For DKA and HHS Patients) If patient in DKA or HHS when BG is less than or equal to 250 mg/dL, for order to add 5% dextrose to existing fluids.  ONE TIME STAT    NOTIFY PROVIDER: SPECIFY NOTIFY PROVIDER: IF PATIENT IN HHS AND DEVELOPS SEVERE HEADACHE OR ALTERED MENTAL STATUS: NOTIFY PHYSICIAN STAT, RAISE HEAD OF BED 30 DEGREES, AND DECREASE IVF TO 20 ML PER HOUR ONE TIME STAT    POC GLUCOSE - Every 1 hour    POC GLUCOSE    VITAL SIGNS    CARDIAC MONITORING    WEIGH PATIENT    INTAKE AND OUTPUT    APPLY/MAINTAIN SEQUENTIAL COMPRESSION DEVICE    ACTIVITY AS TOLERATED W/ASSIST    GLUCOSTABILIZER    GLUCOSTABILIZER    GLUCOSTABILIZER    GLUCOSTABILIZER    FULL CODE    IP CONSULT TO RESPIRATORY CARE    GLUCOSE, POC    POC CHEM8    GLUCOSE, POC    POC LACTIC ACID    BLOOD GAS,CHEM8,LACTIC ACID POC    GLUCOSE, POC    GLUCOSE, POC    GLUCOSE, POC    EKG, 12 LEAD, INITIAL    INSERT PERIPHERAL IV ONE TIME STAT    SALINE LOCK IV ONE TIME STAT    sodium chloride (NS) flush 5-10 mL    sodium chloride 0.9 % bolus infusion 1,000 mL    insulin regular (NOVOLIN R, HUMULIN R) injection 10 Units    DISCONTD: cefTRIAXone (ROCEPHIN) 2 g in 0.9% sodium chloride (MBP/ADV) 50 mL MBP    sodium chloride 0.9 % bolus infusion 1,000 mL    insulin regular (NOVOLIN R, HUMULIN R) 100 Units in 0.9% sodium chloride 100 mL infusion    insulin lispro (HUMALOG) injection    glucose chewable tablet 16 g    glucagon (GLUCAGEN) injection 1 mg    dextrose 10% infusion 0-250 mL    sodium bicarbonate 8.4 % (1 mEq/mL) injection 50 mEq    vancomycin (VANCOCIN) 2000 mg in  ml infusion    piperacillin-tazobactam (ZOSYN) 3.375 g in 0.9% sodium chloride (MBP/ADV) 100 mL MBP    sodium chloride 0.9 % bolus infusion 1,000 mL    0.45% sodium chloride infusion    piperacillin-tazobactam (ZOSYN) 3.375 g in 0.9% sodium chloride (MBP/ADV) 100 mL MBP    sodium chloride (NS) flush 5-40 mL    sodium chloride (NS) flush 5-40 mL    OR Linked Order Group     acetaminophen (TYLENOL) tablet 650 mg     acetaminophen (TYLENOL) suppository 650 mg    polyethylene glycol (MIRALAX) packet 17 g    enoxaparin (LOVENOX) injection 30 mg    Vancomycin - pharmacy dosing    Vancomycin trough    DISCONTD: potassium chloride 10 mEq in 100 ml IVPB    potassium chloride 20 mEq in 50 ml IVPB    phosphorus (K PHOS NEUTRAL) 250 mg tablet 1 Tablet    INITIAL PHYSICIAN ORDER: INPATIENT Intensive Care; 4.  Patient requires ICU level of care interventions (further clarification in H&P documentation)    IP CONSULT TO PHARMACY - VANCOMYCIN DOSING    IP CONSULT TO PHARMACY - VANCOMYCIN DOSING    IP CONSULT TO DIABETES MANAGEMENT     ED Medications Given:   Medications   sodium chloride (NS) flush 5-10 mL (has no administration in time range)   insulin regular (NOVOLIN R, HUMULIN R) 100 Units in 0.9% sodium chloride 100 mL infusion ( IntraVENous Held by provider 3/6/23 4058)   insulin lispro (HUMALOG) injection (has no administration in time range)   glucose chewable tablet 16 g (has no administration in time range)   glucagon (GLUCAGEN) injection 1 mg (has no administration in time range)   dextrose 10% infusion 0-250 mL (has no administration in time range)   0.45% sodium chloride infusion (125 mL/hr IntraVENous New Bag 3/6/23 0135)   piperacillin-tazobactam (ZOSYN) 3.375 g in 0.9% sodium chloride (MBP/ADV) 100 mL MBP (has no administration in time range)   sodium chloride (NS) flush 5-40 mL (has no administration in time range)   sodium chloride (NS) flush 5-40 mL (has no administration in time range)   acetaminophen (TYLENOL) tablet 650 mg (has no administration in time range)     Or   acetaminophen (TYLENOL) suppository 650 mg (has no administration in time range)   polyethylene glycol (MIRALAX) packet 17 g (has no administration in time range)   enoxaparin (LOVENOX) injection 30 mg (has no administration in time range)   Vancomycin - pharmacy dosing (has no administration in time range)   Vancomycin trough (has no administration in time range)   potassium chloride 20 mEq in 50 ml IVPB (20 mEq IntraVENous New Bag 3/6/23 8745)   phosphorus (K PHOS NEUTRAL) 250 mg tablet 1 Tablet (has no administration in time range)   sodium chloride 0.9 % bolus infusion 1,000 mL (0 mL IntraVENous IV Completed 3/6/23 0131)   insulin regular (NOVOLIN R, HUMULIN R) injection 10 Units (10 Units IntraVENous Given 3/6/23 0032)   sodium chloride 0.9 % bolus infusion 1,000 mL (0 mL IntraVENous IV Completed 3/6/23 0131)   sodium bicarbonate 8.4 % (1 mEq/mL) injection 50 mEq (50 mEq IntraVENous Given 3/6/23 0047)   vancomycin (VANCOCIN) 2000 mg in  ml infusion (2,000 mg IntraVENous New Bag 3/6/23 0317)   piperacillin-tazobactam (ZOSYN) 3.375 g in 0.9% sodium chloride (MBP/ADV) 100 mL MBP (0 g IntraVENous IV Completed 3/6/23 0200)   sodium chloride 0.9 % bolus infusion 1,000 mL (0 mL IntraVENous IV Completed 3/6/23 0316)     Pt received IV/IM medications per above and placed on appropriate cardiac/respiratory monitoring due to drug toxicity. ED Physician Interpretation of Test Results: All results were independently reviewed and interpreted by myself, notably showing:     RADIOLOGY:  Non-plain film images such as CT, ultrasound and MRI are read by the radiologist. Lyell Enter radiographic images are visualized and preliminarily interpreted by the ED Provider with the below findings:     Imaging interpreted by me: CXR with bilateral perihilar opacities, left IJ catheter in place, no PTX    Interpretation per the Radiologist below, if available at the time of this note:     XR CHEST PORT   Final Result      No pneumothorax following left IJ catheter placement. Decreased perihilar and   bibasilar opacities. XR CHEST PORT   Final Result      Increased bilateral perihilar and decreased bibasilar opacities compared to   6/30/2022 may represent atelectasis, edema, infection, or aspiration. CT Results  (Last 48 hours)      None          CXR Results  (Last 48 hours)                 03/06/23 0014  XR CHEST PORT Final result    Impression:      No pneumothorax following left IJ catheter placement. Decreased perihilar and   bibasilar opacities. Narrative:  EXAM:  XR CHEST PORT       INDICATION: Left IJ catheter placement.        COMPARISON: 3/5/2023 at 2152 hours       TECHNIQUE: Portable AP semiupright chest view at 0009 hours       FINDINGS: A left IJ catheter terminates in profile with the SVC. The   cardiomediastinal and hilar contours are within normal limits. Perihilar and bibasilar opacities are decreased. There is no pleural effusion   There is no pneumothorax. A skin fold overlies the left lung apex. The bones and   upper abdomen are stable. 03/05/23 2202  XR CHEST PORT Final result    Impression:      Increased bilateral perihilar and decreased bibasilar opacities compared to   6/30/2022 may represent atelectasis, edema, infection, or aspiration. Narrative:  EXAM:  XR CHEST PORT       INDICATION: Altered mental status. COMPARISON: 6/30/2022       TECHNIQUE: Portable AP chest view at 2155 hours       FINDINGS: The cardiomediastinal contours are stable. The pulmonary vasculature   is within normal limits. There are increased bilateral perihilar and decreased bibasilar opacities   compared to 6/30/2022. There is no pleural effusion or pneumothorax. The bones   and upper abdomen are stable. XR CHEST PORT   Final Result      No pneumothorax following left IJ catheter placement. Decreased perihilar and   bibasilar opacities. XR CHEST PORT   Final Result      Increased bilateral perihilar and decreased bibasilar opacities compared to   6/30/2022 may represent atelectasis, edema, infection, or aspiration. My interpretation of EKG: No STEMI. See my EKG interpretation in ED course above.     My interpretation of laboratory results:   CBC showing WBC elevated > 21K, Hgb stable  CMP showing elevated anion gap, bicarb 6, blood sugar > 1,341  Cr elevated > 2.4 and in acute renal failure  Initial lactic acid 6.04 improved after fluid resuscitation  Venous blood gas showing pH 7.11, bicarb 6  Trop elevated > 100    Progress Note:  ED Course as of 03/08/23 2044   Sun Mar 05, 2023   0030 Blood gas showing severe met acidosis, pH 7.11, bicarb 6. Will give amp of bicarb. [HW]   0100 Pt with need for additional IV access given critical nature and need for multiple medications including IV antibiotics, IV fluids, bicarb and insulin drip. Will consent from significant other for central line placement. [HW]   0130 Anion gap elevated 36, c/w type 1 DKA, insulin bolus and insulin drip infusing [HW]   2130 Vital Signs  Level of Consciousness: Alert (0)  Temp: 98.1 °F (36.7 °C)  Pulse (Heart Rate): 134 Abnormal   Resp Rate: 26  BP: 135/54 Abnormal   MAP (Calculated): 81  MEWS Score: 5   [HW]   2135 Blood glucose > 600, high concern for DKA contributing to AMS [HW]   2200 Multiple attempts by RN and techs for IV access without success. [HW]   2020 Procedure Note- Peripheral IV Access  10:27 PM  Performed by: MD Dr. Rere Bishop gained IV access using  18 gauge needle because the patient had no vascular access. After cleaning the site with alcohol prep, the Left EJ vein was localized with ultrasound guidance in an anterior approach. Line confirmation was obtained by direct visualization and good blood return. No anaesthetic was used. The line was successfully flushed with normal saline and was secured with transparent tape. Estimated blood loss: scant  The procedure took 1-15 minutes, and pt tolerated well   [HW]   2330 Lactic acid elevated 6.04, more concern for DKA than acute infection; will send blood cultures, procalcitonin, CXR and urine studies pending [HW]   2350 Trop elevated 111, likely Type 2 NSTEMI, no EKG changes, presentation not c/w ACS [HW]   Mon Mar 06, 2023   0010 Labs returned, elevated WBC noted with left shift [HW]   0028 pH, venous (POC)(!!): 7.11 [HW]   0028 pCO2, venous (POC)(!): 20.8 [HW]   0028 CO2, POC(!!): 6 [HW]   0028 Lactic Acid (POC)(!!): 3.85 [HW]   0030 Lactic acid down to 3.85 with fluid resuscitation.  Pt has received 2L fluids, will order another liter, hemodynamic improving, HR improving [HW]   0145 Given CXR findings, possible aspiration PNA vs multifocal PNA, will cover with broad spectrum IV abx including IV vancomycin and IV zosyn [HW]      ED Course User Index  [HW] Stuart Cherry MD      Procedure Note - Central Line Placement:   1:50 AM    Performed by: Tracy Villatoro MD    Immediately prior to the procedure, the patient was reevaluated and found suitable for the planned procedure and any planned medications. Immediately prior to the procedure a time out was called to verify the correct patient, procedure, equipment, staff, and marking as appropriate. Area was cleansed with Chlorprep and anesthetized with 3 mLs of 1% lidocaine. Prepped and draped in sterile fashion. Landmarks identified. 16G needle with triple lumen catheter was inserted into pt's Left, Internal Jugular Vein with ultrasound guidance. Line sutured in place; sterile dressing applied. Position: Trendelenburg  Number of attempts: 1  Estimated blood loss: mild  Ultrasound guidance was used for real-time guidance in which the vessel was patent. (insert Haiku picture)  The procedure took 16-30 minutes, and pt tolerated well. Amount and/or Complexity of Data Reviewed  HIGH complexity decision making performed   Presentation: ACUTE and SEVERE  Clinical lab tests: ordered as appropriate & reviewed  Tests in the radiology section of CPT®: ordered as appropriate & reviewed  Tests in the medicine section of CPT®: ordered as appropriate & reviewed  Obtain history from someone other than the patient: yes  Review and summarize past medical records: yes  Independent visualization of images, tracings, or specimens: yes    Risks  OTC drugs. Prescription drug management. Parenteral controlled substances. Drug therapy requiring intensive monitoring for toxicity. Decision regarding hospitalization.      Progress Note:  Pt in acute severe Type 1 DKA requiring insulin bolus, insulin drip and required up to 3L of IVF bolus for fluid resuscitation, pt also required amp of bicarb for profound metabolic acidosis. Progress Note:  Pt also high risk for benzo withdrawal and opioid withdrawal; Per chart review, pt takes xanax at home (2 mg TID) and MS contin (100 mg BID). Progress Note:  Pt to receive another amp of bicarb, will check stat VBG, check repeat BMP to ensure AG closing    Progress Note:  Lactic acid 6.04-->3.85     [facility] ED SEPSIS NOTE:     The patient now meets criteria for: Severe Sepsis    Fluid resuscitation with: 30 mL/kg crystalloid bolus  Due to concern for rapidly advancing infection and deterioration of patient's condition, antibiotics are started STAT and cultures ordered. I've performed a sepsis reassessment of the patient's clinical volume status and tissue perfusion at time. 2:00 AM    ED Consult Note:   Maurine Gilford, MD spoke with Jamey Valverde NP  Specialty: ICU  Discussed pt's hx, presentation, current and ongoing workup. Reviewed available diagnostic data and care plans. Agree with management and plan thus far. Consultant will admit patient to the ICU. CRITICAL CARE NOTE :  IMPENDING DETERIORATION -Airway, Respiratory, Cardiovascular, CNS, Metabolic, Renal, and Hepatic  ASSOCIATED RISK FACTORS - Hypotension, Shock, Hypoxia, Dysrhythmia, Metabolic changes, Dehydration, Vascular Compromise, and CNS Decompensation  MANAGEMENT- Bedside Assessment and Supervision of Care  INTERPRETATION -  Xrays, CT Scan, Blood Gases, ECG, Blood Pressure, Cardiac Output Measures , and Screening Ultrasound  INTERVENTIONS - hemodynamic mngmt, vascular control, Neurologic interventions , and Metabolic interventions  CASE REVIEW - Hospitalist/Intensivist, Nursing, and Family  TREATMENT RESPONSE -Stable  PERFORMED BY - Self    NOTES   :  I personally spent 150 minutes of critical care time with this patient.  This is time spent at this critically ill patient's bedside actively involved in patient care as well as the coordination of care and discussions with the patient's family. This includes time involved in ordering and reviewing of laboratory studies, pulse oximetry, re-evaluation of patient's condition, examination of patient, evaluation of patient's response to treatment, ordering and performing treatments and interventions, review of old charts, consultations with specialist, discussions with family regarding pertinent collateral history and plan of care, bedside attention and documentation. During this entire length of time I was immediately available to the patient. This does not include time spent on separately reported billable procedures. Critical Care: The reason for providing this level of medical care for this critically-ill patient was due to a critical illness that impaired one or more vital organ systems, such that there was a high probability of imminent or life-threatening deterioration in the patient's condition. This care involved the highest level of preparedness to intervene urgently. This care involved high complexity decision making to assess, manipulate, and support vital system functions, to treat this degree of vital organ system failure, and to prevent further life threatening deterioration of the patients condition requiring frequent assessments and interventions. Beata Bernal MD    ADMIT to ICU  Given the patient's current clinical presentation, I have a high level of concern for decompensation if discharged from the emergency department. Patient is being admitted to the hospital.  Complex decision making was performed, which includes reviewing the patient's available past medical records, laboratory results, and radiographic imaging. The results of their tests and reasons for their admission have been discussed with them and/or available family. They convey agreement and understanding for the need to be admitted and for their admission diagnosis.   Consultation has been made with the inpatient physician specialist for hospitalization. FINAL DIAGNOSIS   Clinical Impression:  1. Type 1 diabetes mellitus with ketoacidotic coma ([facility] [address]    2. Sepsis, due to unspecified organism, unspecified whether acute organ dysfunction present ([facility] [address]    3. Multifocal pneumonia    4. Acute metabolic encephalopathy    5. DEBORAH (acute kidney injury) ([facility] [address]    6. NSTEMI (non-ST elevated myocardial infarction) ([facility] [address]    7. Noncompliance with medications    8. Acute hypokalemia    9. Acute respiratory failure, unspecified whether with hypoxia or hypercapnia (HCC)      Attestation:  I am the attending of record for this patient. I personally performed the services described in this documentation on this date, 3/5/2023 for patient, Pricilla Motta. I have reviewed the chart and verified that the record is accurate and complete.       Jacqueline Sanchez MD (Electronic Signature)

## 2023-03-06 NOTE — PROGRESS NOTES
Patient remains tachypneic with respiratory rate frequently in the 40's. Discussed with Dr. Symone Bartlett. Order placed for chest xray.

## 2023-03-06 NOTE — PROGRESS NOTES
0330: TRANSFER - IN REPORT:    Verbal report received from Lorna Cooper RN (name) on Simón Lilly  being received from ED(unit) for routine progression of care      Report consisted of patients Situation, Background, Assessment and   Recommendations(SBAR). Information from the following report(s) SBAR, Kardex, Intake/Output, MAR, Recent Results, Med Rec Status, and Cardiac Rhythm Sinus Tach  was reviewed with the receiving nurse. Opportunity for questions and clarification was provided. 3733: Assessment completed upon patients arrival to unit and care assumed. Assessment completed- see flowsheet for details. Patient is drowsy, but easily arouses and opens eyes spontaneously. Patient is oriented to self and place, but is disoriented to situation and time. No complaints of pain. Patient moves all extremities spontaneously and purposefully and follows all commands. No edema present. All pulses palpable. O2 sats 91% on room air. Lung sounds diminished bilaterally. Patient turns self well in bed.    0508: Insulin gtt held per NP because potassium level is < 3.2. Will resume insulin gtt in 2 hours after patient has received two doses of IV potassium replacement. Jonna Gess also held at this time. 1827: Patient's CO2 level critical at 11. NP notified, no new orders received. Patient's glucose level also critical at 815. NP notified, no new orders received.    0620: Patient attempted to take PO potassium phosphates replacement but was unable to take pill. Notified NP, received orders for IV repletion. 9720: Patient continuously trying to get out of bed despite verbal redirection and is able to get legs all of the way out of the bed. Spoke with NP, received orders for sitter. 0710: Bedside shift change report given to Nii Bae RN/Maximino JUAREZ RN (oncoming nurse) by Beverly Ch RN (offgoing nurse).  Report included the following information SBAR, Kardex, Intake/Output, MAR, Recent Results, Med Rec Status, and Cardiac Rhythm Sinus Tach .

## 2023-03-06 NOTE — H&P
[facility] Daily Progress Note. Name: David Whitfield   : 1970   MRN: [id number]   Date: 3/6/2023        Chief Complaint   Patient presents with    Diabetes     Patient's family states he believes that she is having a problem with diabetes; pt is confused in triage, pt wears an insulin pump and removed it this morning per family. Pt's BG in triage reports HI on glucometer. Altered mental status         HPI:     46 y/o with pmh of DM1, managed on insulin pump and anxiety [facility] [id number] [facility]. Per chart review pt discontinued her insulin pump earlier in the day. Upon arrival to ED, pt altered and tachypneic. Labs notable for WBC 21, lactic acid of 6.04, biacrb of 6, glucose of 1341, Cr. 2.48, VBG showed a pH of 7.11. Given 3 L IVF, insulin gtt, and broad spectrum antibiotics for possible aspiration PNA. ICU consulted for admission. Upon my arrival, pt HDS but tachycardic, SPO2 98% but tachypneic. Answering yes/no questions but altered. Will be admitted to the ICU for further management.     Active Problems Being Managed:     DKA   Sepsis   DEBORAH      Assessment/Plan:     DKA  -glucose 1341  -AG 36  -bicarb 6  -pH 7.1  -send HgA1c  -s/p 2 amps of bicarb and 3 L of IVF in ED  -titrate insulin gtt as per DKA protocol  -NS 1/2 125cc/hr   -BMP Q 4 hr  -once BS<250 will switch fluids to D51/2NS  -diabetes management consult to assess home management and help with insulin pump once AG closes     Sepsis of unknown etiology  -send UA  -CXR showed Increased bilateral perihilar and decreased bibasilar opacities  -WBC 21  -lactic acid 6.04-->3.85  -follow up blood cultures   -send procalitonin  -cont zosyn/vanc for now    DEBORAH  -Cr 2.48 upon admission (up from 1.54), repeat 2.77 after IVF  -no Urine output after 3 L IVF, c/f urinary retention/obstruction, place chew  -avoid nephrotoxic agents  -if not improving after chew palcement will get renal ultrasound  -send urine lytes  -monitor electrolytes    DVT prophylaxis: Lovenox  SUP: n/a  Code status: Full    Next of kin: no family NOK listed    I personally spent 60 minutes of critical care time. This is time spent at this critically ill patient's bedside actively involved in patient care as well as the coordination of care and discussions with the patient's family. This does not include any procedural time which has been billed separately. Review of systems:     Review of Systems   Unable to perform ROS: Mental acuity        Objective:     Vital Signs:  Visit Vitals  /76   Pulse (!) 118   Temp 98.1 °F (36.7 °C)   Resp (!) 38   Ht 5' 4\" (1.626 m)   Wt 95.3 kg (210 lb)   SpO2 97%   BMI 36.05 kg/m²        Temp (24hrs), Av.1 °F (36.7 °C), Min:98.1 °F (36.7 °C), Max:98.1 °F (36.7 °C)           Intake/Output:     Intake/Output Summary (Last 24 hours) at 3/6/2023 0350  Last data filed at 3/6/2023 0316  Gross per 24 hour   Intake 3100 ml   Output --   Net 3100 ml       Physical Exam:  Physical Exam  Constitutional:       Appearance: She is ill-appearing. HENT:      Nose: No congestion. Mouth/Throat:      Mouth: Mucous membranes are dry. Eyes:      Conjunctiva/sclera: Conjunctivae normal.      Pupils: Pupils are equal, round, and reactive to light. Cardiovascular:      Rate and Rhythm: Regular rhythm. Tachycardia present. Pulses: Normal pulses. Heart sounds: Normal heart sounds. Pulmonary:      Breath sounds: Rales present. Comments: tachypnea  Abdominal:      General: There is no distension. Palpations: Abdomen is soft. Tenderness: There is no abdominal tenderness. Musculoskeletal:         General: Normal range of motion. Cervical back: Normal range of motion. Skin:     General: Skin is warm. Capillary Refill: Capillary refill takes less than 2 seconds. Neurological:      General: No focal deficit present.        Past Medical History:        has a past medical history of Achilles tendon rupture, Arthritis, Chronic kidney disease, Chronic pain, Diabetes ([facility] [address] DM type 1 (diabetes mellitus, type 1) ([facility] [address] Endometriosis, Gastric ulcer, GERD (gastroesophageal reflux disease), Herpes, Other and unspecified hyperlipidemia, Panic attacks, Psychiatric disorder, PTSD (post-traumatic stress disorder), and Unspecified essential hypertension. Past Surgical History:      has a past surgical history that includes hx orthopaedic (2002); hx orthopaedic; hx colonoscopy; hx gyn; hx gyn; hx gyn; hx orthopaedic (Left, 02/07/2019); and pr unlisted procedure cardiac surgery. Home Medications:     Prior to Admission medications    Medication Sig Start Date End Date Taking? Authorizing Provider   Omeprazole delayed release (PRILOSEC D/R) 20 mg tablet Take 20 mg by mouth daily. Provider, Historical   metoclopramide HCl (REGLAN) 5 mg tablet Take 1 Tablet by mouth two (2) times a day. 12/12/22   Laura Moore MD   HumaLOG U-100 Insulin 100 unit/mL injection USE AS DIRECTED FOR INSULIN PUMP.  UNITS PER DAY. DX E10.65--replaces novolog 10/26/22   Laura Moore MD   carvediloL (COREG) 6.25 mg tablet TAKE ONE TABLET BY MOUTH DAILY 9/15/22   Laura Moore MD   cefpodoxime Maretta Hook) 100 mg tablet daily. 9/11/22   Provider, Historical   glucagon (Glucagon Emergency Kit, human,) 1 mg solr USE AS DIRECTED IN KIT INSTRUCTIONS 6/24/22   Laura Moore MD   gabapentin (NEURONTIN) 600 mg tablet Take 1 Tablet by mouth four (4) times daily. Max Daily Amount: 2,400 mg. Replaces the 300 mg caps 6/16/22   Laura Moore MD   buPROPion SR [facility] SR) 100 mg SR tablet daily. 4/20/22   Alisa Marin MD   spironolactone (ALDACTONE) 25 mg tablet 50 mg daily. 5/10/22   Alisa Marin MD   naloxone (Narcan) 4 mg/actuation nasal spray Use 1 spray intranasally, then discard. Repeat with new spray every 2 min as needed for opioid overdose symptoms, alternating nostrils.  5/14/22 Susan Miller MD   BD Insulin Syringe Ultra-Fine 0.5 mL 31 gauge x 5/16\" syrg USE ONE SYRINGE - FOUR TIMES A DAY 4/2/22   Ann Marie Dsouza MD   mirtazapine (REMERON) 7.5 mg tablet Take 15 mg by mouth daily. 3/4/22   Provider, Historical   morphine CR (MS CONTIN) 100 mg CR tablet Take 1 Tablet by mouth two (2) times a day. 3/7/22   Provider, Historical   Accu-Chek [employer] Use to test up to 8 times per day. DX Code: 10.65--delete prescription for softclix sent in error  Patient not taking: Reported on 12/12/2022 9/27/21   Ann Marie Dsouza MD   Accu-[place] Guide test strips strip Use as directed to test up to 8 times per day. Dx E10.65  Patient not taking: Reported on 12/12/2022 9/21/21   Ann Marie Dsouza MD   aspirin delayed-release 81 mg tablet Take 81 mg by mouth daily. Provider, Historical   diclofenac (VOLTAREN) 1 % gel Apply 2 g to affected area four (4) times daily as needed for Pain. RT KNEE    Provider, Historical   oxyCODONE IR (ROXICODONE) 5 mg immediate release tablet Take 5 mg by mouth every four (4) hours as needed for Pain. Provider, Historical   rosuvastatin (CRESTOR) 40 mg tablet Take 40 mg by mouth daily. Provider, Historical   brexpiprazole (REXULTI) 0.5 mg tab tablet Take 1 mg by mouth two (2) times a day. Provider, Historical   busPIRone (BUSPAR) 10 mg tablet Take 20 mg by mouth two (2) times a day. Provider, Historical   ALPRAZolam (XANAX) 1 mg tablet Take 1 mg by mouth three (3) times daily as needed. 1.5 tabs TID as needed 9/25/20   Provider, Historical   fluticasone propionate (FLONASE) 50 mcg/actuation nasal spray 2 Sprays by Both Nostrils route daily as needed for Rhinitis. 9/10/20   Lino Gonsalez III, DO   bumetanide (BUMEX) 2 mg tablet Take 2 mg by mouth two (2) times a day. 7/26/20   Ann Marie Dsouza MD   esomeprazole (NEXIUM) 40 mg capsule TAKE ONE CAPSULE BY MOUTH DAILY  Patient taking differently: 80 mg daily.  10/21/19   Wallace, Lino CARMEN III, DO   insulin pump (PATIENT SUPPLIED) misc by SubCUTAneous route as needed. Provider, Historical   levonorgestreL (MIRENA) 20 mcg/24 hours (7 yrs) 52 mg IUD 1 Device by IntraUTERine route once. Provider, Historical         Allergies/Social/Family History: Allergies   Allergen Reactions    Metolazone Other (comments)     Caused hyponatremia    Zocor [Simvastatin] Myalgia    Lisinopril Cough    Medrol [Methylprednisolone] Other (comments)     Caused severe hyperglycemia with the medrol pack      Social History     Tobacco Use    Smoking status: Every Day     Packs/day: 0.50     Years: 28.00     Pack years: 14.00     Types: Cigarettes    Smokeless tobacco: Current    Tobacco comments:     Decreased to a half a pack    Substance Use Topics    Alcohol use: Not Currently     Comment: a beer every few months      Family History   Problem Relation Age of Onset    Diabetes Mother         diet controlled    Diabetes Father         R foot amupation    Liver Disease Father     Heart Disease Paternal Uncle         MI in his 46s    Stroke Neg Hx          LABS AND  DATA:   Reviewed      Peak airway pressure:      Minute ventilation:        CRITICAL CARE CONSULTANT NOTE  I had a face to face encounter with the patient, reviewed and interpreted patient data including clinical events, labs, images, vital signs, I/O's, and examined patient. I have discussed the case and the plan and management of the patient's care with the consulting services, the bedside nurses and the respiratory therapist.      NOTE OF PERSONAL INVOLVEMENT IN CARE   This patient has a high probability of imminent, clinically significant deterioration, which requires the highest level of preparedness to intervene urgently.  I participated in the decision-making and personally managed or directed the management of the following life and organ supporting interventions that required my frequent assessment to treat or prevent imminent deterioration.         Hernan Byrnes NP   [facility]  [phone]  3/6/2023

## 2023-03-06 NOTE — ED NOTES
Since arrival IV access unable to be obtained. Dr. Shavonne Gale placed EJ and it stopped working and was removed. He later placed a central line in the left and during the pts SpO2 dropped into the 60s. She was placed on NRB and then BMV used. Xray immedaitely obtained and showed no penumo and physician gave the go ahead to use the line.   SpO2 now maintained on [address]

## 2023-03-06 NOTE — PROGRESS NOTES
Pharmacy Antimicrobial Kinetic Dosing    Indication for Antimicrobials: Aspiration Pneumonia     Current Regimen of Each Antimicrobial:  Vancomycin - pharmacy dosing; Start Date 3/6; Day # 1  Zosyn 3.375 g IV Q8H; Start Date 3/6; Day # 1    Previous Antimicrobial Therapy:  NA     Goal Level: VANCOMYCIN TROUGH GOAL RANGE    Vancomycin Trough: 15 - 20 mcg/mL    Date Dose & Interval Measured (mcg/mL) Predicted AUC/SANDY                       Date & time of next level: 3/7 am labs    Dosing calculator used: DEBORAH protocol    Significant Cultures:   NA    Conditions for Dosing Consideration: None    Labs:  Recent Labs     238   CREA 2.48*   BUN 41*     Recent Labs     23   WBC 21.0*     Temp (24hrs), Av.1 °F (36.7 °C), Min:98.1 °F (36.7 °C), Max:98.1 °F (36.7 °C)    Creatinine Clearance (mL/min):   CrCl (Adjusted Body Weight): 29.7 mL/min   If actual weight < IBW: CrCl (Actual Body Weight) 39.9    Impression/Plan:   Vancomycin 2500 mg IV once then check random vanc level ~24 hours post dose. Re dose with 10-20 mg/kg when level <15-20  Zosyn dose okay  BMP daily  Antimicrobial stop date TBD     Pharmacy will follow daily and adjust medications as appropriate for renal function and/or serum levels.     Thank you,  Tracie Hedrick, PHARMD

## 2023-03-06 NOTE — CONSULTS
Date of Consultation:  March 6, 2023    Referring Physician: Marshall Lagos MD    Reason for Consultation: ? Seizure, altered mental status    Chief Complaint   Patient presents with    Diabetes     Patient's family states he believes that she is having a problem with diabetes; pt is confused in triage, pt wears an insulin pump and removed it this morning per family. Pt's BG in triage reports HI on glucometer. Altered mental status       History of Present Illness:   Luis Smith is a 47 y.o. female with history of CKD, type 1 diabetes on insulin pump, anxiety with panic attacks, PTSD, hypertension presenting with blood sugar of 1341 found to be in diabetic ketoacidosis. Neurology was consulted for altered mental status and rapid EEG showing 50% seizure burden. Unfortunately the patient is unable to provide any history due to mental status being altered. Per chart review, patient discontinued her insulin pump earlier in the day for unclear reasons. On arrival to the ED she was altered and tachypneic. WBC 21, lactic acid of 6.04, bicarb of 6, glucose of 1341, creatinine of 2.48. She was started on an insulin drip and broad-spectrum antibiotics for possible aspiration pneumonia. The patient does take Xanax for anxiety/panic attacks. She has never had any history of seizures however she has had an admission in 2017 where she had possibly a seizure in the setting of hyperglycemia and DKA.       Past Medical History:   Diagnosis Date    Achilles tendon rupture     along with torn right patellar/femoral ligament    Arthritis     rt. foot    Chronic kidney disease     Chronic pain     abdominal pain    Diabetes (HCC)     IDDM on insulin pump and sensor    DM type 1 (diabetes mellitus, type 1) ([facility]     age 25    Endometriosis     s/p ex-lap 4x    Gastric ulcer     GERD (gastroesophageal reflux disease)     Herpes     Other and unspecified hyperlipidemia     Panic attacks     Psychiatric disorder     anxiety, panic attacks    PTSD (post-traumatic stress disorder)     Unspecified essential hypertension         Past Surgical History:   Procedure Laterality Date    HX COLONOSCOPY      HX GYN      2 d&c's    HX GYN      laparoscopies x5 for endometriosis    HX GYN      mirena in place    HX ORTHOPAEDIC  2002    achiles tendon repair,talus repair    HX ORTHOPAEDIC      injections x2 to right shoulder    HX ORTHOPAEDIC Left 02/07/2019    3rd trigger finger release    [place] CARDIAC SURG PROCEDURE UNLIST          Family History   Problem Relation Age of Onset    Diabetes Mother         diet controlled    Diabetes Father         R foot amupation    Liver Disease Father     Heart Disease Paternal Uncle         MI in his 46s    Stroke Neg Hx         Social History     Tobacco Use    Smoking status: Every Day     Packs/day: 0.50     Years: 28.00     Pack years: 14.00     Types: Cigarettes    Smokeless tobacco: Current    Tobacco comments:     Decreased to a half a pack    Substance Use Topics    Alcohol use: Not Currently     Comment: a beer every few months        Allergies   Allergen Reactions    Metolazone Other (comments)     Caused hyponatremia    Zocor [Simvastatin] Myalgia    Lisinopril Cough    Medrol [Methylprednisolone] Other (comments)     Caused severe hyperglycemia with the medrol pack        Prior to Admission medications    Medication Sig Start Date End Date Taking? Authorizing Provider   butalbital-acetaminophen-caffeine (FIORICET, ESGIC) -40 mg per tablet  2/13/23   Provider, Historical   albuterol (PROVENTIL HFA, VENTOLIN HFA, PROAIR HFA) 90 mcg/actuation inhaler  2/28/23   Provider, Historical   citalopram (CELEXA) 40 mg tablet  2/7/23   Provider, Historical   Nyamyc powder  2/13/23   Provider, Historical   famotidine (PEPCID) 40 mg tablet  1/27/23   Provider, Historical   metoclopramide HCl (REGLAN) 5 mg tablet Take 1 Tablet by mouth two (2) times a day.  12/12/22   Suad Bell MD   HumaLOG U-100 Insulin 100 unit/mL injection USE AS DIRECTED FOR INSULIN PUMP.  UNITS PER DAY. DX E10.65--replaces novolog 10/26/22   Jensen Shore MD   carvediloL (COREG) 6.25 mg tablet TAKE ONE TABLET BY MOUTH DAILY 9/15/22   Jensen Shore MD   cefpodoxime Mancel Baba) 100 mg tablet daily. 9/11/22   Provider, Historical   glucagon (Glucagon Emergency Kit, human,) 1 mg solr USE AS DIRECTED IN KIT INSTRUCTIONS 6/24/22   Jensen Shore MD   gabapentin (NEURONTIN) 600 mg tablet Take 1 Tablet by mouth four (4) times daily. Max Daily Amount: 2,400 mg. Replaces the 300 mg caps 6/16/22   Jensen Shore MD   buPROPion [facility]) 100 mg SR tablet daily. 4/20/22   Other, MD Alisa   spironolactone (ALDACTONE) 25 mg tablet 50 mg daily. 5/10/22   Other, MD Alisa   naloxone (Narcan) 4 mg/actuation nasal spray Use 1 spray intranasally, then discard. Repeat with new spray every 2 min as needed for opioid overdose symptoms, alternating nostrils. 5/14/22   Paty Enrique MD   BD Insulin Syringe Ultra-Fine 0.5 mL 31 gauge x 5/16\" syrg USE ONE SYRINGE - FOUR TIMES A DAY 4/2/22   Jensen Shore MD   mirtazapine (REMERON) 7.5 mg tablet Take 15 mg by mouth daily. 3/4/22   Provider, Historical   morphine CR (MS CONTIN) 100 mg CR tablet Take 1 Tablet by mouth two (2) times a day. 3/7/22   Provider, Historical   Accu-Chek [employer] Use to test up to 8 times per day. DX Code: 10.65--delete prescription for softclix sent in error  Patient not taking: Reported on 12/12/2022 9/27/21   Jensen Shore MD   AccDavid Guide test strips strip Use as directed to test up to 8 times per day. Dx E10.65  Patient not taking: Reported on 12/12/2022 9/21/21   Jensen Shore MD   aspirin delayed-release 81 mg tablet Take 81 mg by mouth daily. Provider, Historical   diclofenac (VOLTAREN) 1 % gel Apply 2 g to affected area four (4) times daily as needed for Pain.  RT KNEE    Provider, Historical   oxyCODONE IR (ROXICODONE) 5 mg immediate release tablet Take 5 mg by mouth every four (4) hours as needed for Pain. Provider, Historical   rosuvastatin (CRESTOR) 40 mg tablet Take 40 mg by mouth daily. Provider, Historical   brexpiprazole (REXULTI) 0.5 mg tab tablet Take 1 mg by mouth two (2) times a day. Provider, Historical   busPIRone (BUSPAR) 10 mg tablet Take 20 mg by mouth two (2) times a day. Provider, Historical   ALPRAZolam (XANAX) 1 mg tablet Take 1 mg by mouth three (3) times daily as needed. 1.5 tabs TID as needed 9/25/20   Provider, Historical   fluticasone propionate (FLONASE) 50 mcg/actuation nasal spray 2 Sprays by Both Nostrils route daily as needed for Rhinitis. 9/10/20   Lino Gonsalez III, DO   bumetanide (BUMEX) 2 mg tablet Take 2 mg by mouth two (2) times a day. 7/26/20   Em Fleming MD   insulin pump (PATIENT SUPPLIED) misc by SubCUTAneous route as needed. Provider, Historical   levonorgestreL (MIRENA) 20 mcg/24 hours (7 yrs) 52 mg IUD 1 Device by IntraUTERine route once.     Provider, Historical       Review of Systems:    General, constitutional: negative  Eyes, vision: negative  Ears, nose, throat: negative  Cardiovascular, heart: negative  Respiratory: negative  Gastrointestinal: negative  Genitourinary: negative  Musculoskeletal: negative  Skin and integumentary: negative  Psychiatric: negative  Endocrine: negative  Neurological: negative, except for HPI  Hematologic/lymphatic: negative  Allergy/immunology: negative    []Unable to obtain  ROS due to  []mental status change  []sedated   []intubated      PHYSICAL EXAMINATION:   Visit Vitals  /62 (BP 1 Location: Right upper arm, BP Patient Position: At rest)   Pulse (!) 121   Temp (!) 102 °F (38.9 °C)   Resp (!) 46   Ht 5' 4\" (1.626 m)   Wt 204 lb 9.4 oz (92.8 kg)   SpO2 91%   BMI 35.12 kg/m²       Physical Exam:  General: Anxious, tremulous  Neck: supple no mass   Lungs: Comfortable on room air  Heart: Well-perfused  Lower extremity: no edema    Neurological exam:  Mental Status: Awake, alert to examiner, does not follow any commands. Says \"Hey\" when examiner walks them however does not answer any other questions. Unable to test or assess language. No notable dysarthria. Cranial nerves: II-XII:  Pupils equal and reactive, visual fields intact by confrontation   Extraocular movements intact, no evidence of nystagmus or ptosis   Facial movements symmetric; no facial droop  Tongue midline    Motor:   Normal tone and Bulk   Tremulousness throughout primarily in the upper extremities and torso      Strength testing:  Unable to participate in confrontational strength testing, antigravity in all 4 limbs    Sensory: Withdraws to painful stim    Reflexes:   Biceps Triceps  Brachiorad Patellar Achilles Plantar Hoffmans   Right  2 2 2 1 absent Down NT   Left  2 2 2 1 absent Down NT     Cerebellar testing: Unable to participate in testing      Data:     Lab Results   Component Value Date/Time    Hemoglobin A1c 10.9 (H) 01/18/2023 12:00 AM    Sodium 147 (H) 03/06/2023 01:31 PM    Potassium 3.1 (L) 03/06/2023 01:31 PM    Chloride 115 (H) 03/06/2023 01:31 PM    Glucose 384 (H) 03/06/2023 01:31 PM    BUN 53 (H) 03/06/2023 01:31 PM    Creatinine 2.78 (H) 03/06/2023 01:31 PM    Calcium 8.1 (L) 03/06/2023 01:31 PM    WBC 20.2 (H) 03/06/2023 05:02 AM    HCT 33.2 (L) 03/06/2023 05:02 AM    HGB 10.3 (L) 03/06/2023 05:02 AM    PLATELET 213 93/35/8178 05:02 AM    LDL,Direct 64 10/22/2014 11:30 AM    Hemoglobin A1c, External 10.6 09/18/2019 12:00 AM       Imaging:    Results from Hospital Encounter encounter on 11/20/17    MRI BRAIN WO CONT    Narrative  INDICATION:  seizure    COMPARISON:  Head CT of 11/20/2017    TECHNIQUE:  MR imaging of the brain was performed with sagittal T1, axial T1,  T2, FLAIR, GRE, DWI/ADC; coronal T2.  Coronal FLAIR images of the brain, thin  section high-resolution coronal T2-weighted images of the temporal lobes. FINDINGS:    Study mildly compromised by patient motion. DIFFUSION: No acute infarction. VENTRICLES: Midline, no hydrocephalus. Mild prominence of the ventricles and  cortical sulci, consistent with cerebral volume loss. Gabriel Watters BRAIN PARENCHYMA/BRAINSTEM: No focal lesions. . No imaging evidence of mesial  temporal sclerosis. INTRACRANIAL HEMORRHAGE:  None. BASAL CISTERNS:  Patent. FLOW VOIDS: Normal.  PARANASAL SINUSES: Clear  CRANIOCERVICAL JUNCTION: Normal  ADDITIONAL COMMENTS: N/A. Impression  IMPRESSION:  No acute findings or focal structural lesions. Mild generalized volume loss. .      Results from [place] encounter on 03/17/20    CTA CHEST W OR W WO CONT    Narrative  INDICATION:   Hypoxia    COMPARISON: None. TECHNIQUE:  Precontrast  images were obtained to localize the volume for acquisition. Multislice helical CT arteriography was performed from the diaphragm to the  thoracic inlet during uneventful rapid bolus intravenous administration of 100  mL of Isovue-370. Lung and soft tissue windows were generated. Post processing  was performed and coronal reformatted images were also generated. 3 D imaging  was performed. CT dose reduction was achieved through use of a standardized  protocol tailored for this examination and automatic exposure control for dose  modulation. FINDINGS:  Lungs are clear of an acute process. There are small subpleural cystic changes  predominantly in the upper lobes consistent with emphysema. There is scarring in  the right middle lobe. A small amount of mucus noted in the trachea and right  bronchus. No definite pulmonary embolus is identified. The main pulmonary artery is  borderline enlarged measuring 3.3 cm. There is no mediastinal or hilar adenopathy or mass. The aorta enhances normally  without evidence of aneurysm or dissection.     The visualized portions of the upper abdominal organs are normal.    Impression  IMPRESSION:  1. No definite pulmonary emboli identified. 2. There is scarring in the right middle lobe. 3. There are small subpleural cystic changes predominantly upper lobes  consistent with emphysema. 4. There is borderline enlargement of the main pulmonary artery. 5. There is a small amount of mucus in the trachea and right mainstem bronchus. IMPRESSION/RECOMMENDATIONS:  Joanna Rockwell is a 47 y.o. female who presents with altered mental status in the setting of diabetic ketoacidosis with tremulousness on exam concerning for possible seizure, rapid EEG showing 50% seizure burden, with routine EEG significant for encephalopathy and no seizures seen. Patient's altered mental status is due to hyperglycemia. She is of course at risk for seizures however with correction of metabolic derangements, I do expect her to improve. Recommendations:  - Would recommend to continue seizure precautions, IV Ativan for seizure lasting more than 2 minutes or IM Versed  - Continue to correct metabolic derangements and underlying source of sepsis  - Head CT noncontrast when stable  - For tremulousness, may consider 0.25 mg twice daily as needed of clonazepam      Thank you very much for this consultation. Neurology will continue to follow closely. I spent 60 minutes providing critical care to this acutely ill inpatient with > 50% of the time counseling as well as reviewing the patient's chart, notes, labs, medications and preparing documentation along with assisting in the coordination of care of the patient on the patient's hospital floor/unit.        Kalie Brannon,

## 2023-03-06 NOTE — DIABETES MGMT
[facility] [facility]    CLINICAL NURSE SPECIALIST CONSULT     Initial Presentation   Lucero Gill is a 47 y.o. female admitted 3/6/2023 after experiencing AMS and hyperglycemia. The patient reportedly stopped using her insulin pump a couple days ago. LAB: Glucose 1341. Creatine 1.54. GFR 23. Anion gap 36. A1c 0.9% (1/18/2023). CO2 6  CXR:Study Result    Narrative & Impression   EXAM:  XR CHEST PORT     INDICATION: Altered mental status. COMPARISON: 6/30/2022     TECHNIQUE: Portable AP chest view at 2155 hours     FINDINGS: The cardiomediastinal contours are stable. The pulmonary vasculature  is within normal limits. There are increased bilateral perihilar and decreased bibasilar opacities  compared to 6/30/2022. There is no pleural effusion or pneumothorax. The bones  and upper abdomen are stable. IMPRESSION     Increased bilateral perihilar and decreased bibasilar opacities compared to  6/30/2022 may represent atelectasis, edema, infection, or aspiration     CT/MRI:    HX:   Past Medical History:   Diagnosis Date    Achilles tendon rupture     along with torn right patellar/femoral ligament    Arthritis     rt. foot    Chronic kidney disease     Chronic pain     abdominal pain    Diabetes (HCC)     IDDM on insulin pump and sensor    DM type 1 (diabetes mellitus, type 1) ([facility]     age 25    Endometriosis     s/p ex-lap 4x    Gastric ulcer     GERD (gastroesophageal reflux disease)     Herpes     Other and unspecified hyperlipidemia     Panic attacks     Psychiatric disorder     anxiety, panic attacks    PTSD (post-traumatic stress disorder)     Unspecified essential hypertension         INITIAL DX:   DKA (diabetic ketoacidosis) ([facility] [E11.10]     Current Treatment     TX: ABX. Clot prevention.      Consulted by Provider for advanced diabetes nursing assessment and care for:   [x] Transitioning off Paola Kishan   [x] Inpatient management strategy  [x] Home management assessment  [] Survival skill education    Hospital Course   Clinical progress has been complicated by DKA. Diabetes History   The patient has a hx of Type 1 diabetes with nephropathy and follows with endocrinologist Bonifacio Krishna M.D. Current settings are as follows:  - basal: 12a: 1.4  - Carb ratio: 12a: 10, 11a: 10. 5p: 10  - sensitivity: 28  - target: 12a: 130-150  - active insulin time: 3 hours    Diabetes-related Medical History  Acute complications  DKA  Microvascular disease  Nephropathy  Other associated conditions     Anxiety, PTSD    Diabetes Medication History  Key Antihyperglycemic Medications               HumaLOG U-100 Insulin 100 unit/mL injection USE AS DIRECTED FOR INSULIN PUMP.  UNITS PER DAY. DX E10.65--replaces novolog    insulin pump (PATIENT SUPPLIED) misc by SubCUTAneous route as needed. Diabetes self-management practices:   Eating pattern Deferred     Physical activity pattern Deferred    Monitoring pattern   [x] Using CGM     Taking medications pattern  Using insulin pump PTA  Social determinants of health impacting diabetes self-management practices   Struggling with anxiety and/or depression  Overall evaluation:    [x] Not achieving A1c target with drug therapy & self-care practices    Subjective   Un responsive to verbal cues\"     Objective   Physical exam  General Obese female; ill-appearing. Neuro  Not alert   Vital Signs Visit Vitals  BP (!) 130/53   Pulse (!) 121   Temp 99.8 °F (37.7 °C)   Resp (!) 46   Ht 5' 4\" (1.626 m)   Wt 92.8 kg (204 lb 9.4 oz)   SpO2 93%   BMI 35.12 kg/m²     s +2. Laboratory  Recent Labs     03/06/23  1120 03/06/23  0818 03/06/23  0502 03/06/23  0241 03/05/23  2238   * 795* 815*   < > 1,341*   AGAP 23* 24* 24*   < > 36*   WBC  --   --  20.2*  --  21.0*   CREA 2.98* 2.57* 2.77*   < > 2.48*   AST  --   --   --   --  38*   ALT  --   --   --   --  42    < > = values in this interval not displayed.        Factors impacting BG management  Factor Dose Comments   Nutrition:     NPO    Pain PRN acetaminophen suppository    Infection Doxycycline  Zosyn    Other:   Kidney function  Liver function     GFR 18  AST 38      Blood glucose pattern      Significant diabetes-related events over the past 24-72 hours  Admission glucose 1341; Anion gap 36 at admission; started on glucostabilizer    Assessment and Plan   Nursing Diagnosis Risk for unstable blood glucose pattern   Nursing Intervention Domain 5253 Decision-making Support   Nursing Interventions Examined current inpatient diabetes/blood glucose control   Explored factors facilitating and impeding inpatient management  Explored corrective strategies with patient and responsible inpatient provider   Informed patient of rational for insulin strategy while hospitalized       Evaluation   This 47year old female patient with Type 1 diabetes did not achieve Bg control prior to admission as evidenced by an A1c of 10.9%. The patient reportedly uses an insulin pump at home but had turned it off for a couple days. The patient was brought to the hospital with AMS and was found to be in DKA. She is on glucostabilizer and is requiring a significant amount of insulin per hour. Her most recent rate change is 25.6 units per hour on glucostabilizer. Her gap remains open. The patient was unresponsive to name and touch. I suspect the patient will need to remain on glucostabilizer today. Diabetes management will continue to follow with the patient, monitoring BG trends and chemistry labs.      .    Orders/Plan   Continue on glucostabilizer per DKA protocol    Billing Code(s)   [] [id number] IP subsequent hospital care - 50 minutes   [] 46577 IP subsequent hospital care - 35 minutes   [x] 55617 IP subsequent hospital care - 25 minutes     Before making these care recommendations, I personally reviewed the hospitalization record, including notes, laboratory & diagnostic data and current medications, and examined the patient at the bedside (circumstances permitting) before making care recommendations. More than fifty (50) percent of the time was spent in patient counseling and/or care coordination.   Total minutes: 25 minutes    TONY Banks  Diabetes Clinical Nurse Specialist  [facility]  Access via [employer]

## 2023-03-06 NOTE — PROCEDURES
RAPID [facility] ELECTROENCEPHALOGRAM REPORT    EEG start date/time: 3/6/2023 at 11:25 AM  EEG end date/time: 3/6/2023 at 12:07 PM    Diagnosis: Altered mental status    Patient consent: Correct patient identified    Description of procedure: This EEG was obtained using a 10 lead, 8 channel system positioned circumferentially without any parasagittal coverage (rapid EEG). Computer selected EEG is reviewed as well as background features and all clinically significant events. Clarity algorithm utilized and implemented to provide analysis of underlying activity and seizure detection used to facilitate reading. Description of recording: The background activity of this EEG consists of a mixed frequency in the alpha, theta, delta, beta range. Amplitudes appear medium to high in voltage and symmetric in both hemispheres. There is background symmetry, although there are intermittent periods where there is some slowing in the right hemisphere. The background is reactive, continuous, variable. There is evidence of intermittent but frequent high amplitude 1 to 2 Hz sharps seen occasionally in both hemispheres with frontal predominance that may be concerning for possible epileptiform discharges. Within the limitations of the study, there are no clear or well-formed electrographic seizures. Seizure burden 50%. Impression: This rapid EEG does show evidence of 50% seizure burden with amplitudes that tend to be in high voltage and possible intermittent but frequent high amplitude 1 to 2 Hz sharp seen with frontal predominance in both hemispheres concerning for possible epileptiform discharges. There are no clear or well-formed electrographic seizures however would recommend routine EEG with a 10-20 international electrode system for improved spatial resolution and parasagittal coverage.       Cristina Childers, DO   Neurophysiology

## 2023-03-06 NOTE — PROGRESS NOTES
Participated in CCU IDR where pt was discussed.   Jerald Tolentino M.Div, [facility] Service [phone] ([phone])

## 2023-03-06 NOTE — DISCHARGE INSTRUCTIONS
Patient Discharge Instructions    Gwen Healy / [id number] : 1970    Admitted 3/5/2023 Discharged: 3/10/2023         DISCHARGE DIAGNOSIS:   DKA  Sepsis of unknown etiology  ? CAP  COPD  Chronic diastolic HF  Acute on chronic respiratory failure , on 3 L/M at home  DEBORAH  AMS - Metabolic encephalopathy   GERD  Anxiety/panic attacks   PTSD       Take Home Medications     {Medication reconciliation information is now added to the patient's AVS automatically when it is printed. There is no need to use this SmartLink in discharge instructions. Highlight this text and delete it to clear this message}      General drug facts     If you have a very bad allergy, wear an allergy ID at all times. It is important that you take the medication exactly as they are prescribed. Keep your medication in the bottles provided by the pharmacist.  Keep a list of all your drugs (prescription, natural products, vitamins, OTC) with you. Give this list to your doctor. Do not take other medications without consulting your doctor. Do not share your drugs with others and do not take anyone else's drugs. Keep all drugs out of the reach of children and pets. Most drugs may be thrown away in household trash after mixing with coffee grounds or lynette litter and sealing in a plastic bag. Keep a list Call your doctor for help with any side effects. If in the [place] you may also call the FDA at 0-582-FDA-0827    Talk with the doctor before starting any new drug, including OTC, natural products, or vitamins. What to do at Home    1. Recommended diet: Diabetic     2. Recommended activity: Activity as tolerated    3. If you experience any of the following symptoms then please call your primary care physician or return to the emergency room if you cannot get hold of your doctor:    4. Wound Care: Cleanse the buttocks / gluteal cleft gently with soap and water and dry the skin well.  Apply the Desitin cream to the skin and allow it to absorb some into the skin. Leave open to air and turn patient off of the buttocks every 2 hours. Float the heels. 5. Lab work: Check blood sugar 4 times a day and take it to PCP. CBC and BMP in 1 week    6. Stop smocking    7. Please note that you are being placed on Lantus and also insulin lispro in place of insulin pump. Once you follow-up with your endocrinologist and are back on insulin pump, please stop Lantus and lispro    8. Bring these papers with you to your follow up appointments. The papers will help your doctors be sure to continue the care plan from the hospital.    9.  Please note your home medication of MS Contin, Xanax are being stopped due to altered mental status. Your home dose of gabapentin is also being reduced due to confusion. These can be restarted at a later time once your confusion is improved. If you have questions regarding the hospital related prescriptions or hospital related issues please call [facility] at [phone]. You can always direct your questions to your primary care doctor if you are unable to reach your hospital physician; your PCP works as an extension of your hospital doctor just like your hospital doctor is an extension of your PCP for your time at the hospital [facility], [facility])      Follow-up with:   PCP: @PCP@  [facility], [address]  [phone]             Please call for your own appointment        Information obtained by :  I understand that if any problems occur once I am at home I am to contact my physician. I understand and acknowledge receipt of the instructions indicated above.                                                                                                                                            Physician's or R.N.'s Signature Date/Time                                                                                                                                              Patient or Representative Signature                                                          Date/Time               Smoking Cessation Program:   [facility] is now offering a proven, interactive, text-based smoking cessation program for FREE! To register, please text Yovany Chick [phone] or visit [employer]  For more information, please call: [phone]

## 2023-03-06 NOTE — PROGRESS NOTES
0700- Bedside and Verbal shift change report given to Julien Caceres RN (oncoming nurse) by Yuriy Khan RN (offgoing nurse). Report included the following information SBAR, Kardex, Intake/Output, MAR, and Cardiac Rhythm Sinus Tach . 0735-  O2 saturation 85, started on 2L NC  O2 saturation up to 93.     0800- Shift assessment completed, patient knows name but does not respond to place or time. Needs constant redirecting to stay in bed. Lung sounds are clear and bowl sounds are hypo active. O2 increased to 4L due to decrease in O2 saturation. 0815- O2 saturation now 90 after increasing O2 to 4L.    1022- Dr. Annabella Lofton notified about patients increase in temp to 102.4 and urine output of 11ml for the hour. Wants to continue to monitor and collect urine specimen. 1030- Interdisciplinary team rounds were held 3/6/2023 with the following team members:Care Management, Diabetes Treatment Specialist, Nursing, Nutrition, Pharmacy, and Physician and the patient. Plan of care discussed. See clinical pathway and/or care plan for interventions and desired outcomes. Goals of the Day: Get urine cultures, and another bolus of LR. Continue to lower glucose. 1035-Headband: applied  Date/Time: 4\6\5967  Recorder: recording started  Skin: Intact  Highest Seizure [place] Percentage past hour: 53      General info regarding Seizure [place] %:  Minimum duration of study is 2 hours. If Seizure [place] has remained 0% throughout the entire 2-hour duration, communicate with provider to stop the recording. Seizure [place] 0-10% - Continue to monitor and complete 2-hour study. Seizure [place] 11-89% - Epileptiform activity present. Notify provider for next steps. Seizure [place] >/= 90% - Epileptiform activity consistent with Status Epilepticus. Immediately notify provider. *Patients with Seizure [place] above 10% that persists may require a study longer than 2 hours. Maximum recording duration is 24 hours.  Please update provider with a persistent increase in Seizure [place] above 10%. 1150- Neuro consult ordered as well as stat EEG by Dr. Los Whittington  for seizure burden of 50 on the ceribell. 1200- Reassessment completed, patient now on 3L NC saturation at 92 and tolerating well. Only noted change is patient is now diaphoretic.    1209- EEG being done at the bedside. 1340- Dr. Martin Griffin in to see patient at the bedside. 1448-Blood glucose 205, discussed  with Dr. Alejandra Jorgensen order placed for IV D5 1/2 NS.    1600- Reassessment completed no changes noted at this time. 1816-  Tylenol suppository given for temp of 102.    1900- Bedside and Verbal shift change report given to Janice JOHN (oncoming nurse) by Mat Roberts and Glenn Schroeder RN (offgoing nurse). Report included the following information SBAR, Kardex, Intake/Output, MAR, and Cardiac Rhythm Sinus Tachy .

## 2023-03-07 LAB
ADMINISTERED INITIALS, ADMINIT: NORMAL
ANION GAP SERPL CALC-SCNC: 5 MMOL/L (ref 5–15)
ANION GAP SERPL CALC-SCNC: 7 MMOL/L (ref 5–15)
ANION GAP SERPL CALC-SCNC: 7 MMOL/L (ref 5–15)
BASOPHILS # BLD: 0 K/UL (ref 0–0.1)
BASOPHILS NFR BLD: 0 % (ref 0–1)
BUN SERPL-MCNC: 33 MG/DL (ref 6–20)
BUN SERPL-MCNC: 41 MG/DL (ref 6–20)
BUN SERPL-MCNC: 44 MG/DL (ref 6–20)
BUN/CREAT SERPL: 18 (ref 12–20)
BUN/CREAT SERPL: 20 (ref 12–20)
BUN/CREAT SERPL: 21 (ref 12–20)
CALCIUM SERPL-MCNC: 7.9 MG/DL (ref 8.5–10.1)
CALCIUM SERPL-MCNC: 8 MG/DL (ref 8.5–10.1)
CALCIUM SERPL-MCNC: 8.2 MG/DL (ref 8.5–10.1)
CHLORIDE SERPL-SCNC: 122 MMOL/L (ref 97–108)
CHLORIDE SERPL-SCNC: 122 MMOL/L (ref 97–108)
CHLORIDE SERPL-SCNC: 124 MMOL/L (ref 97–108)
CO2 SERPL-SCNC: 21 MMOL/L (ref 21–32)
CO2 SERPL-SCNC: 22 MMOL/L (ref 21–32)
CO2 SERPL-SCNC: 22 MMOL/L (ref 21–32)
CREAT SERPL-MCNC: 1.85 MG/DL (ref 0.55–1.02)
CREAT SERPL-MCNC: 1.95 MG/DL (ref 0.55–1.02)
CREAT SERPL-MCNC: 2.2 MG/DL (ref 0.55–1.02)
D50 ADMINISTERED, D50ADM: 0 ML
D50 ORDER, D50ORD: 0 ML
DIFFERENTIAL METHOD BLD: ABNORMAL
EOSINOPHIL # BLD: 0 K/UL (ref 0–0.4)
EOSINOPHIL NFR BLD: 0 % (ref 0–7)
ERYTHROCYTE [DISTWIDTH] IN BLOOD BY AUTOMATED COUNT: 16.9 % (ref 11.5–14.5)
FLUID CULTURE, SPNG2: NORMAL
GLUCOSE BLD STRIP.AUTO-MCNC: 171 MG/DL (ref 65–117)
GLUCOSE BLD STRIP.AUTO-MCNC: 177 MG/DL (ref 65–117)
GLUCOSE BLD STRIP.AUTO-MCNC: 189 MG/DL (ref 65–117)
GLUCOSE BLD STRIP.AUTO-MCNC: 190 MG/DL (ref 65–117)
GLUCOSE BLD STRIP.AUTO-MCNC: 196 MG/DL (ref 65–117)
GLUCOSE BLD STRIP.AUTO-MCNC: 197 MG/DL (ref 65–117)
GLUCOSE BLD STRIP.AUTO-MCNC: 210 MG/DL (ref 65–117)
GLUCOSE BLD STRIP.AUTO-MCNC: 216 MG/DL (ref 65–117)
GLUCOSE BLD STRIP.AUTO-MCNC: 230 MG/DL (ref 65–117)
GLUCOSE BLD STRIP.AUTO-MCNC: 235 MG/DL (ref 65–117)
GLUCOSE BLD STRIP.AUTO-MCNC: 255 MG/DL (ref 65–117)
GLUCOSE BLD STRIP.AUTO-MCNC: 321 MG/DL (ref 65–117)
GLUCOSE SERPL-MCNC: 203 MG/DL (ref 65–100)
GLUCOSE SERPL-MCNC: 216 MG/DL (ref 65–100)
GLUCOSE SERPL-MCNC: 238 MG/DL (ref 65–100)
GLUCOSE, GLC: 171 MG/DL
GLUCOSE, GLC: 177 MG/DL
GLUCOSE, GLC: 189 MG/DL
GLUCOSE, GLC: 190 MG/DL
GLUCOSE, GLC: 196 MG/DL
GLUCOSE, GLC: 197 MG/DL
GLUCOSE, GLC: 216 MG/DL
GLUCOSE, GLC: 230 MG/DL
GLUCOSE, GLC: 235 MG/DL
GLUCOSE, GLC: 255 MG/DL
HCT VFR BLD AUTO: 31.9 % (ref 35–47)
HGB BLD-MCNC: 10.1 G/DL (ref 11.5–16)
HIGH TARGET, HITG: 200 MG/DL
IMM GRANULOCYTES # BLD AUTO: 0.1 K/UL (ref 0–0.04)
IMM GRANULOCYTES NFR BLD AUTO: 1 % (ref 0–0.5)
INSULIN ADMINSTERED, INSADM: 2.3 UNITS/HOUR
INSULIN ADMINSTERED, INSADM: 2.5 UNITS/HOUR
INSULIN ADMINSTERED, INSADM: 2.7 UNITS/HOUR
INSULIN ADMINSTERED, INSADM: 2.7 UNITS/HOUR
INSULIN ADMINSTERED, INSADM: 2.9 UNITS/HOUR
INSULIN ADMINSTERED, INSADM: 2.9 UNITS/HOUR
INSULIN ADMINSTERED, INSADM: 3.7 UNITS/HOUR
INSULIN ADMINSTERED, INSADM: 5.3 UNITS/HOUR
INSULIN ADMINSTERED, INSADM: 8 UNITS/HOUR
INSULIN ADMINSTERED, INSADM: 8 UNITS/HOUR
INSULIN ORDER, INSORD: 2.3 UNITS/HOUR
INSULIN ORDER, INSORD: 2.5 UNITS/HOUR
INSULIN ORDER, INSORD: 2.7 UNITS/HOUR
INSULIN ORDER, INSORD: 2.7 UNITS/HOUR
INSULIN ORDER, INSORD: 2.9 UNITS/HOUR
INSULIN ORDER, INSORD: 2.9 UNITS/HOUR
INSULIN ORDER, INSORD: 3.7 UNITS/HOUR
INSULIN ORDER, INSORD: 5.3 UNITS/HOUR
INSULIN ORDER, INSORD: 8 UNITS/HOUR
INSULIN ORDER, INSORD: 8 UNITS/HOUR
L PNEUMO1 AG UR QL IA: NEGATIVE
LOW TARGET, LOT: 150 MG/DL
LYMPHOCYTES # BLD: 2.6 K/UL (ref 0.8–3.5)
LYMPHOCYTES NFR BLD: 17 % (ref 12–49)
MAGNESIUM SERPL-MCNC: 1.9 MG/DL (ref 1.6–2.4)
MAGNESIUM SERPL-MCNC: 1.9 MG/DL (ref 1.6–2.4)
MAGNESIUM SERPL-MCNC: 2 MG/DL (ref 1.6–2.4)
MCH RBC QN AUTO: 26.5 PG (ref 26–34)
MCHC RBC AUTO-ENTMCNC: 31.7 G/DL (ref 30–36.5)
MCV RBC AUTO: 83.7 FL (ref 80–99)
MINUTES UNTIL NEXT BG, NBG: 120 MIN
MINUTES UNTIL NEXT BG, NBG: 60 MIN
MONOCYTES # BLD: 1.2 K/UL (ref 0–1)
MONOCYTES NFR BLD: 8 % (ref 5–13)
MULTIPLIER, MUL: 0.02
MULTIPLIER, MUL: 0.03
MULTIPLIER, MUL: 0.04
MULTIPLIER, MUL: 0.05
NEUTS SEG # BLD: 11.8 K/UL (ref 1.8–8)
NEUTS SEG NFR BLD: 74 % (ref 32–75)
NRBC # BLD: 0 K/UL (ref 0–0.01)
NRBC BLD-RTO: 0 PER 100 WBC
ORDER INITIALS, ORDINIT: NORMAL
ORGANISM ID, SPNG3: NORMAL
PHOSPHATE SERPL-MCNC: 2.3 MG/DL (ref 2.6–4.7)
PLATELET # BLD AUTO: 234 K/UL (ref 150–400)
PLEASE NOTE, SPNG4: NORMAL
PMV BLD AUTO: 10.5 FL (ref 8.9–12.9)
POTASSIUM SERPL-SCNC: 3.5 MMOL/L (ref 3.5–5.1)
POTASSIUM SERPL-SCNC: 3.6 MMOL/L (ref 3.5–5.1)
POTASSIUM SERPL-SCNC: 3.6 MMOL/L (ref 3.5–5.1)
PROCALCITONIN SERPL-MCNC: 3.26 NG/ML
RBC # BLD AUTO: 3.81 M/UL (ref 3.8–5.2)
S PNEUM AG SPEC QL LA: NEGATIVE
SERVICE CMNT-IMP: ABNORMAL
SODIUM SERPL-SCNC: 149 MMOL/L (ref 136–145)
SODIUM SERPL-SCNC: 150 MMOL/L (ref 136–145)
SODIUM SERPL-SCNC: 153 MMOL/L (ref 136–145)
SPECIMEN SOURCE: NORMAL
SPECIMEN SOURCE: NORMAL
SPECIMEN, SPNG1: NORMAL
VANCOMYCIN SERPL-MCNC: 18.7 UG/ML
VANCOMYCIN SERPL-MCNC: 8.8 UG/ML
WBC # BLD AUTO: 15.7 K/UL (ref 3.6–11)

## 2023-03-07 PROCEDURE — 74011636637 HC RX REV CODE- 636/637: Performed by: INTERNAL MEDICINE

## 2023-03-07 PROCEDURE — 74011000258 HC RX REV CODE- 258: Performed by: NURSE PRACTITIONER

## 2023-03-07 PROCEDURE — 74011250636 HC RX REV CODE- 250/636: Performed by: INTERNAL MEDICINE

## 2023-03-07 PROCEDURE — 85025 COMPLETE CBC W/AUTO DIFF WBC: CPT

## 2023-03-07 PROCEDURE — 99231 SBSQ HOSP IP/OBS SF/LOW 25: CPT

## 2023-03-07 PROCEDURE — 74011250636 HC RX REV CODE- 250/636: Performed by: NURSE PRACTITIONER

## 2023-03-07 PROCEDURE — 77010033678 HC OXYGEN DAILY

## 2023-03-07 PROCEDURE — 65610000006 HC RM INTENSIVE CARE

## 2023-03-07 PROCEDURE — 80202 ASSAY OF VANCOMYCIN: CPT

## 2023-03-07 PROCEDURE — 84145 PROCALCITONIN (PCT): CPT

## 2023-03-07 PROCEDURE — 80048 BASIC METABOLIC PNL TOTAL CA: CPT

## 2023-03-07 PROCEDURE — 83735 ASSAY OF MAGNESIUM: CPT

## 2023-03-07 PROCEDURE — 74011636637 HC RX REV CODE- 636/637: Performed by: EMERGENCY MEDICINE

## 2023-03-07 PROCEDURE — 82962 GLUCOSE BLOOD TEST: CPT

## 2023-03-07 PROCEDURE — 99291 CRITICAL CARE FIRST HOUR: CPT | Performed by: INTERNAL MEDICINE

## 2023-03-07 PROCEDURE — 74011000250 HC RX REV CODE- 250: Performed by: INTERNAL MEDICINE

## 2023-03-07 PROCEDURE — 74011000250 HC RX REV CODE- 250: Performed by: NURSE PRACTITIONER

## 2023-03-07 PROCEDURE — 74011250637 HC RX REV CODE- 250/637: Performed by: INTERNAL MEDICINE

## 2023-03-07 PROCEDURE — 36415 COLL VENOUS BLD VENIPUNCTURE: CPT

## 2023-03-07 PROCEDURE — 74011000258 HC RX REV CODE- 258: Performed by: EMERGENCY MEDICINE

## 2023-03-07 PROCEDURE — 74011250637 HC RX REV CODE- 250/637: Performed by: NURSE PRACTITIONER

## 2023-03-07 PROCEDURE — 84100 ASSAY OF PHOSPHORUS: CPT

## 2023-03-07 PROCEDURE — 74011636637 HC RX REV CODE- 636/637

## 2023-03-07 PROCEDURE — 74011000258 HC RX REV CODE- 258: Performed by: INTERNAL MEDICINE

## 2023-03-07 RX ORDER — MIRTAZAPINE 15 MG/1
15 TABLET, FILM COATED ORAL
Status: DISCONTINUED | OUTPATIENT
Start: 2023-03-07 | End: 2023-03-10 | Stop reason: HOSPADM

## 2023-03-07 RX ORDER — METOCLOPRAMIDE 10 MG/1
5 TABLET ORAL 2 TIMES DAILY
Status: DISCONTINUED | OUTPATIENT
Start: 2023-03-07 | End: 2023-03-10 | Stop reason: HOSPADM

## 2023-03-07 RX ORDER — MORPHINE SULFATE 30 MG/1
30 TABLET, FILM COATED, EXTENDED RELEASE ORAL
Status: DISCONTINUED | OUTPATIENT
Start: 2023-03-07 | End: 2023-03-10 | Stop reason: HOSPADM

## 2023-03-07 RX ORDER — ALPRAZOLAM 2 MG/1
2 TABLET ORAL
COMMUNITY
End: 2023-03-10

## 2023-03-07 RX ORDER — IBUPROFEN 200 MG
4 TABLET ORAL AS NEEDED
Status: DISCONTINUED | OUTPATIENT
Start: 2023-03-07 | End: 2023-03-08

## 2023-03-07 RX ORDER — BUMETANIDE 1 MG/1
2 TABLET ORAL 2 TIMES DAILY
Status: DISCONTINUED | OUTPATIENT
Start: 2023-03-07 | End: 2023-03-10 | Stop reason: HOSPADM

## 2023-03-07 RX ORDER — INSULIN LISPRO 100 [IU]/ML
INJECTION, SOLUTION INTRAVENOUS; SUBCUTANEOUS
Status: DISCONTINUED | OUTPATIENT
Start: 2023-03-07 | End: 2023-03-08

## 2023-03-07 RX ORDER — ALPRAZOLAM 0.5 MG/1
0.5 TABLET ORAL
Status: DISCONTINUED | OUTPATIENT
Start: 2023-03-07 | End: 2023-03-10 | Stop reason: HOSPADM

## 2023-03-07 RX ORDER — BUPROPION HYDROCHLORIDE 150 MG/1
150 TABLET, EXTENDED RELEASE ORAL DAILY
Status: DISCONTINUED | OUTPATIENT
Start: 2023-03-07 | End: 2023-03-10 | Stop reason: HOSPADM

## 2023-03-07 RX ORDER — POTASSIUM CHLORIDE 7.45 MG/ML
10 INJECTION INTRAVENOUS ONCE
Status: COMPLETED | OUTPATIENT
Start: 2023-03-07 | End: 2023-03-07

## 2023-03-07 RX ORDER — VANCOMYCIN/0.9 % SOD CHLORIDE 1.5G/250ML
1500 PLASTIC BAG, INJECTION (ML) INTRAVENOUS ONCE
Status: COMPLETED | OUTPATIENT
Start: 2023-03-07 | End: 2023-03-07

## 2023-03-07 RX ORDER — ENOXAPARIN SODIUM 100 MG/ML
40 INJECTION SUBCUTANEOUS DAILY
Status: DISCONTINUED | OUTPATIENT
Start: 2023-03-08 | End: 2023-03-10 | Stop reason: HOSPADM

## 2023-03-07 RX ORDER — FAMOTIDINE 20 MG/1
20 TABLET, FILM COATED ORAL DAILY
Status: DISCONTINUED | OUTPATIENT
Start: 2023-03-07 | End: 2023-03-10 | Stop reason: HOSPADM

## 2023-03-07 RX ORDER — POTASSIUM CHLORIDE 7.45 MG/ML
10 INJECTION INTRAVENOUS
Status: COMPLETED | OUTPATIENT
Start: 2023-03-07 | End: 2023-03-07

## 2023-03-07 RX ORDER — DEXTROSE MONOHYDRATE 100 MG/ML
0-250 INJECTION, SOLUTION INTRAVENOUS AS NEEDED
Status: DISCONTINUED | OUTPATIENT
Start: 2023-03-07 | End: 2023-03-10 | Stop reason: HOSPADM

## 2023-03-07 RX ORDER — BUSPIRONE HYDROCHLORIDE 5 MG/1
10 TABLET ORAL 2 TIMES DAILY
Status: DISCONTINUED | OUTPATIENT
Start: 2023-03-07 | End: 2023-03-10 | Stop reason: HOSPADM

## 2023-03-07 RX ORDER — ROSUVASTATIN CALCIUM 40 MG/1
40 TABLET, COATED ORAL DAILY
Status: DISCONTINUED | OUTPATIENT
Start: 2023-03-07 | End: 2023-03-10 | Stop reason: HOSPADM

## 2023-03-07 RX ORDER — VANCOMYCIN HYDROCHLORIDE
1250 EVERY 24 HOURS
Status: DISCONTINUED | OUTPATIENT
Start: 2023-03-08 | End: 2023-03-08

## 2023-03-07 RX ORDER — HYDROMORPHONE HYDROCHLORIDE 1 MG/ML
0.2 INJECTION, SOLUTION INTRAMUSCULAR; INTRAVENOUS; SUBCUTANEOUS ONCE
Status: COMPLETED | OUTPATIENT
Start: 2023-03-07 | End: 2023-03-07

## 2023-03-07 RX ORDER — GABAPENTIN 100 MG/1
100 CAPSULE ORAL 3 TIMES DAILY
Status: DISCONTINUED | OUTPATIENT
Start: 2023-03-07 | End: 2023-03-10 | Stop reason: HOSPADM

## 2023-03-07 RX ORDER — INSULIN GLARGINE 100 [IU]/ML
18 INJECTION, SOLUTION SUBCUTANEOUS
Status: COMPLETED | OUTPATIENT
Start: 2023-03-07 | End: 2023-03-07

## 2023-03-07 RX ADMIN — SODIUM CHLORIDE 5.3 UNITS/HR: 9 INJECTION, SOLUTION INTRAVENOUS at 10:04

## 2023-03-07 RX ADMIN — HYDROMORPHONE HYDROCHLORIDE 0.2 MG: 1 INJECTION, SOLUTION INTRAMUSCULAR; INTRAVENOUS; SUBCUTANEOUS at 01:41

## 2023-03-07 RX ADMIN — ACETAMINOPHEN 325MG 650 MG: 325 TABLET ORAL at 23:11

## 2023-03-07 RX ADMIN — PIPERACILLIN AND TAZOBACTAM 3.38 G: 3; .375 INJECTION, POWDER, LYOPHILIZED, FOR SOLUTION INTRAVENOUS at 17:49

## 2023-03-07 RX ADMIN — DOXYCYCLINE 100 MG: 100 INJECTION, POWDER, LYOPHILIZED, FOR SOLUTION INTRAVENOUS at 20:25

## 2023-03-07 RX ADMIN — SODIUM CHLORIDE, PRESERVATIVE FREE 10 ML: 5 INJECTION INTRAVENOUS at 13:47

## 2023-03-07 RX ADMIN — POTASSIUM PHOSPHATE, MONOBASIC AND POTASSIUM PHOSPHATE, DIBASIC: 224; 236 INJECTION, SOLUTION, CONCENTRATE INTRAVENOUS at 11:27

## 2023-03-07 RX ADMIN — SODIUM CHLORIDE, PRESERVATIVE FREE 10 ML: 5 INJECTION INTRAVENOUS at 05:51

## 2023-03-07 RX ADMIN — FAMOTIDINE 20 MG: 20 TABLET, FILM COATED ORAL at 08:38

## 2023-03-07 RX ADMIN — CASTOR OIL AND BALSAM, PERU: 788; 87 OINTMENT TOPICAL at 08:49

## 2023-03-07 RX ADMIN — PIPERACILLIN AND TAZOBACTAM 3.38 G: 3; .375 INJECTION, POWDER, LYOPHILIZED, FOR SOLUTION INTRAVENOUS at 01:13

## 2023-03-07 RX ADMIN — INSULIN GLARGINE 18 UNITS: 100 INJECTION, SOLUTION SUBCUTANEOUS at 09:33

## 2023-03-07 RX ADMIN — Medication 1 AMPULE: at 20:22

## 2023-03-07 RX ADMIN — Medication 1 AMPULE: at 08:47

## 2023-03-07 RX ADMIN — POTASSIUM CHLORIDE 10 MEQ: 7.46 INJECTION, SOLUTION INTRAVENOUS at 01:39

## 2023-03-07 RX ADMIN — POTASSIUM CHLORIDE 10 MEQ: 7.46 INJECTION, SOLUTION INTRAVENOUS at 05:55

## 2023-03-07 RX ADMIN — Medication 7 UNITS: at 15:53

## 2023-03-07 RX ADMIN — POTASSIUM CHLORIDE 10 MEQ: 7.46 INJECTION, SOLUTION INTRAVENOUS at 06:55

## 2023-03-07 RX ADMIN — METOCLOPRAMIDE 5 MG: 10 TABLET ORAL at 17:51

## 2023-03-07 RX ADMIN — NYSTATIN: 100000 POWDER TOPICAL at 20:27

## 2023-03-07 RX ADMIN — MORPHINE SULFATE 30 MG: 30 TABLET, FILM COATED, EXTENDED RELEASE ORAL at 12:25

## 2023-03-07 RX ADMIN — PIPERACILLIN AND TAZOBACTAM 3.38 G: 3; .375 INJECTION, POWDER, LYOPHILIZED, FOR SOLUTION INTRAVENOUS at 09:36

## 2023-03-07 RX ADMIN — BUPROPION HYDROCHLORIDE 150 MG: 150 TABLET, EXTENDED RELEASE ORAL at 08:40

## 2023-03-07 RX ADMIN — GABAPENTIN 100 MG: 100 CAPSULE ORAL at 22:15

## 2023-03-07 RX ADMIN — GABAPENTIN 100 MG: 100 CAPSULE ORAL at 08:40

## 2023-03-07 RX ADMIN — ENOXAPARIN SODIUM 30 MG: 100 INJECTION SUBCUTANEOUS at 08:43

## 2023-03-07 RX ADMIN — DOXYCYCLINE 100 MG: 100 INJECTION, POWDER, LYOPHILIZED, FOR SOLUTION INTRAVENOUS at 08:51

## 2023-03-07 RX ADMIN — MIRTAZAPINE 15 MG: 15 TABLET, FILM COATED ORAL at 22:15

## 2023-03-07 RX ADMIN — ACETAMINOPHEN 650 MG: 650 SUPPOSITORY RECTAL at 04:13

## 2023-03-07 RX ADMIN — ROSUVASTATIN CALCIUM 40 MG: 40 TABLET, FILM COATED ORAL at 10:24

## 2023-03-07 RX ADMIN — GABAPENTIN 100 MG: 100 CAPSULE ORAL at 15:53

## 2023-03-07 RX ADMIN — Medication 3 UNITS: at 12:04

## 2023-03-07 RX ADMIN — CASTOR OIL AND BALSAM, PERU: 788; 87 OINTMENT TOPICAL at 20:23

## 2023-03-07 RX ADMIN — BUMETANIDE 2 MG: 1 TABLET ORAL at 17:53

## 2023-03-07 RX ADMIN — SODIUM CHLORIDE, PRESERVATIVE FREE 10 ML: 5 INJECTION INTRAVENOUS at 22:15

## 2023-03-07 RX ADMIN — METOCLOPRAMIDE 5 MG: 10 TABLET ORAL at 08:35

## 2023-03-07 RX ADMIN — BUSPIRONE HYDROCHLORIDE 10 MG: 5 TABLET ORAL at 08:37

## 2023-03-07 RX ADMIN — NYSTATIN: 100000 POWDER TOPICAL at 08:52

## 2023-03-07 RX ADMIN — BUSPIRONE HYDROCHLORIDE 10 MG: 5 TABLET ORAL at 17:53

## 2023-03-07 RX ADMIN — VANCOMYCIN HYDROCHLORIDE 1500 MG: 10 INJECTION, POWDER, LYOPHILIZED, FOR SOLUTION INTRAVENOUS at 05:56

## 2023-03-07 RX ADMIN — BUMETANIDE 2 MG: 1 TABLET ORAL at 08:42

## 2023-03-07 NOTE — PROGRESS NOTES
Pharmacy Antimicrobial Kinetic Dosing    Indication for Antimicrobials: Aspiration Pneumonia     Current Regimen of Each Antimicrobial:  Vancomycin - pharmacy dosing; Start Date 3/; Day # 2  Zosyn 3.375 g IV Q8H; Start Date 3/6; Day # 2    Previous Antimicrobial Therapy:  NA     Goal Level: VANCOMYCIN TROUGH GOAL RANGE    Vancomycin Trough: 15 - 20 mcg/mL    Date Dose & Interval Measured (mcg/mL) Predicted AUC/SANDY   3/ 2000 mg load 8.8 --                 Date & time of next level: TBD    Dosing calculator used: DEBORAH protocol    Significant Cultures:   NA    Conditions for Dosing Consideration: None    Labs:  Recent Labs     23  0352 23  0027 23  1846 23  0818 23  0502   CREA 1.95* 2.20* 2.28*   < > 2.77*   BUN 41* 44* 49*   < > 48*   PCT 3.26  --   --   --  2.80    < > = values in this interval not displayed. Recent Labs     23  0352 23  0502 23  2238   WBC 15.7* 20.2* 21.0*     Temp (24hrs), Av.8 °F (38.2 °C), Min:99.8 °F (37.7 °C), Max:102.4 °F (39.1 °C)    Creatinine Clearance (mL/min):   CrCl (Adjusted Body Weight): 37.3 mL/min   If actual weight < IBW: CrCl (Actual Body Weight) 49.4    Impression/Plan:   Vancomycin level resulted, 8.8, SCr improving. Will redose at 1500 mg.  IF SCr improves further can schedule vancomycin regimen, until then will dose by levels  CrCl 29  Vancomycin random in 12 hours  Zosyn dose okay  BMP daily  Antimicrobial stop date TBD     Pharmacy will follow daily and adjust medications as appropriate for renal function and/or serum levels.     Thank you,  Doreen Samayoa, PHARMD

## 2023-03-07 NOTE — PROCEDURES
EEG REPORT    Patient Name: Donn Banks  : 1970  Age: 47 y.o. Ordering physician: No ref. provider found  Date of EEG start time: 3/6/2023 at 12:29 PM  Date of EEG end time: 3/6/2023 at 12:52 PM  EEG procedure number: [id number]  Diagnosis: tremors, AMS   Interpreting physician: Kelly Bryant. Tasia Bradshaw. Procedure: EEG    CLINICAL INDICATION: The patient is a 47 y.o. female who is being evaluated for baseline electro cerebral activities and to rule out seizure focus.       Current Facility-Administered Medications   Medication Dose Route Frequency    insulin regular (NOVOLIN R, HUMULIN R) 100 Units in 0.9% sodium chloride 100 mL infusion  0-50 Units/hr IntraVENous TITRATE    insulin lispro (HUMALOG) injection   SubCUTAneous TIDAC    glucose chewable tablet 16 g  4 Tablet Oral PRN    glucagon (GLUCAGEN) injection 1 mg  1 mg IntraMUSCular PRN    dextrose 10% infusion 0-250 mL  0-250 mL IntraVENous PRN    piperacillin-tazobactam (ZOSYN) 3.375 g in 0.9% sodium chloride (MBP/ADV) 100 mL MBP  3.375 g IntraVENous Q8H    sodium chloride (NS) flush 5-40 mL  5-40 mL IntraVENous Q8H    sodium chloride (NS) flush 5-40 mL  5-40 mL IntraVENous PRN    acetaminophen (TYLENOL) tablet 650 mg  650 mg Oral Q6H PRN    Or    acetaminophen (TYLENOL) suppository 650 mg  650 mg Rectal Q6H PRN    polyethylene glycol (MIRALAX) packet 17 g  17 g Oral DAILY PRN    enoxaparin (LOVENOX) injection 30 mg  30 mg SubCUTAneous DAILY    Vancomycin - pharmacy dosing   Other Rx Dosing/Monitoring    [START ON 3/7/2023] Vancomycin trough  1 Each Other ONCE    alcohol 62% (NOZIN) nasal  1 Ampule  1 Ampule Topical Q12H    doxycycline (VIBRAMYCIN) 100 mg in 0.9% sodium chloride (MBP/ADV) 100 mL MBP  100 mg IntraVENous Q12H    balsam peru-castor oiL (VENELEX) ointment   Topical BID    nystatin (MYCOSTATIN) 100,000 unit/gram powder   Topical Q12H    LORazepam (ATIVAN) injection 0.5 mg  0.5 mg IntraVENous Q8H PRN    metoclopramide HCl (REGLAN) injection 5 mg  5 mg IntraVENous BID    dextrose 5% infusion  50 mL/hr IntraVENous CONTINUOUS    sodium chloride (NS) flush 5-10 mL  5-10 mL IntraVENous PRN           DESCRIPTION OF PROCEDURE:   This is a digitally recorded electroencephalogram.  Electrodes were applied in accordance with the international 10-20 system of electrode placement. 18 channels of scalp EEG are recorded. A channel was used for EoG. Another channel was used for ECG. The data is stored digitally and reviewed in reformatted montages for optimal display. EEG was reviewed in both bipolar and referential montages. Description of Activity: The background of this recording contains mixed frequencies in the delta to theta range. There is an asymmetry in the background with intermittent focal polymorphic delta slowing in the right hemisphere. The posterior dominant rhythm reaches 7 hz in the left hemisphere and 4-1/2 Hz in the right hemisphere. Amplitudes are equal and symmetric in both hemispheres. They are normal in voltage. The anterior to posterior gradient is poorly organized. There is variability, continuity, reactivity. Throughout the recording we see abundant evidence of 1 Hz generalized discharges with triphasic morphology. At times these appear more well pronounced in the right hemisphere but have a fluctuating laterality. These do not appear epileptiform in nature. Hyperventilation was not performed. Photic stimulation produced no response in the posterior head regions. During the recording, the patient did not achieve stage II sleep. There are no epileptiform discharges, electrographic seizures. Clinical events of tremulousness reveal diffuse muscle artifact and are not based in seizure. Single channel EKG is uninterpretable. Clinical Interpretation:   This EEG, performed during wakefulness and drowsiness/sleep is abnormal due to the following reasons:   Mild diffuse slowing which is consistent with global cerebral dysfunction which can be suggestive of a toxic-metabolic encephalopathies of nonspecific etiology. Disorganization of the background with evidence of intermittent focal polymorphic delta slowing in the right hemisphere, which can be seen in focal cerebral dysfunction such as structural disease. Please correlate with neuroimaging. Muscle artifact consistent with tremors and not based in seizure. Abundant 1 Hz generalized discharges with triphasic morphology which is seen in encephalopathy, most classically, in hepatic and/or renal dysfunction. There are no epileptiform discharges or electrographic seizures. If further suspicion persists, would recommend obtaining a continuous EEG study. Clinical correlation recommended. Oliverio Graves.

## 2023-03-07 NOTE — DIABETES MGMT
[address] [facility]    CLINICAL NURSE SPECIALIST CONSULT     Initial Presentation   Kaci Palma is a 47 y.o. female admitted 3/6/2023 after experiencing AMS and hyperglycemia. The patient reportedly stopped using her insulin pump a couple days ago. LAB: Glucose 1341. Creatine 1.54. GFR 23. Anion gap 36. A1c 0.9% (1/18/2023). CO2 6  CXR:Study Result    Narrative & Impression   EXAM:  XR CHEST PORT     INDICATION: Altered mental status. COMPARISON: 6/30/2022     TECHNIQUE: Portable AP chest view at 2155 hours     FINDINGS: The cardiomediastinal contours are stable. The pulmonary vasculature  is within normal limits. There are increased bilateral perihilar and decreased bibasilar opacities  compared to 6/30/2022. There is no pleural effusion or pneumothorax. The bones  and upper abdomen are stable. IMPRESSION     Increased bilateral perihilar and decreased bibasilar opacities compared to  6/30/2022 may represent atelectasis, edema, infection, or aspiration     CT/MRI:    HX:   Past Medical History:   Diagnosis Date    Achilles tendon rupture     along with torn right patellar/femoral ligament    Arthritis     rt. foot    Chronic kidney disease     Chronic pain     abdominal pain    Diabetes (HCC)     IDDM on insulin pump and sensor    DM type 1 (diabetes mellitus, type 1) ([address]     age 25    Endometriosis     s/p ex-lap 4x    Gastric ulcer     GERD (gastroesophageal reflux disease)     Herpes     Other and unspecified hyperlipidemia     Panic attacks     Psychiatric disorder     anxiety, panic attacks    PTSD (post-traumatic stress disorder)     Unspecified essential hypertension         INITIAL DX:   DKA (diabetic ketoacidosis) ([address] [E11.10]     Current Treatment     TX: ABX. Clot prevention.      Consulted by Provider for advanced diabetes nursing assessment and care for:   [x] Transitioning off [place]   [x] Inpatient management strategy  [x] Home management assessment  [] Survival skill education    Hospital Course   Clinical progress has been complicated by DKA.   2/0/3763 The patient is awake and alert and awake but remains unresponsive to questions or other verbal cues. Diabetes History   The patient has a hx of Type 1 diabetes with nephropathy and follows with endocrinologist Montez Guzman M.D. Current settings are as follows:  - basal: 12a: 1.4  - Carb ratio: 12a: 10, 11a: 10. 5p: 10  - sensitivity: 28  - target: 12a: 130-150  - active insulin time: 3 hours    Diabetes-related Medical History  Acute complications  DKA  Microvascular disease  Nephropathy  Other associated conditions     Anxiety, PTSD    Diabetes Medication History  Key Antihyperglycemic Medications               HumaLOG U-100 Insulin 100 unit/mL injection USE AS DIRECTED FOR INSULIN PUMP.  UNITS PER DAY. DX E10.65--replaces novolog    insulin pump (PATIENT SUPPLIED) misc by SubCUTAneous route as needed. Diabetes self-management practices:   Eating pattern Deferred     Physical activity pattern Deferred    Monitoring pattern   [x] Using CGM     Taking medications pattern  Using insulin pump PTA  Social determinants of health impacting diabetes self-management practices   Struggling with anxiety and/or depression  Overall evaluation:    [x] Not achieving A1c target with drug therapy & self-care practices    Subjective   Unresponsive to verbal cues\"     Objective   Physical exam  General Obese female; ill-appearing. Neuro  Not alert   Vital Signs Visit Vitals  BP (!) 156/76   Pulse (!) 109   Temp 99.6 °F (37.6 °C)   Resp (!) 46   Ht 5' 4\" (1.626 m)   Wt 97.4 kg (214 lb 11.7 oz)   SpO2 96%   Breastfeeding No   BMI 36.86 kg/m²     s +2.      Laboratory  Recent Labs     03/07/23  0802 03/07/23  0352 03/07/23  0027 03/06/23  0818 03/06/23  0502 03/06/23  0241 03/05/23  2238   * 216* 203*   < > 815*   < > 1,341*   AGAP 5 7 7   < > 24*   < > 36*   WBC  --  15.7*  -- --  20.2*  --  21.0*   CREA 1.85* 1.95* 2.20*   < > 2.77*   < > 2.48*   AST  --   --   --   --   --   --  38*   ALT  --   --   --   --   --   --  42    < > = values in this interval not displayed. Factors impacting BG management  Factor Dose Comments   Nutrition:     60 gm/dimas   FL; eating some   Pain PRN acetaminophen suppository    Infection Doxycycline  Zosyn    Other:   Kidney function  Liver function     GFR 32  AST 38      Blood glucose pattern      Significant diabetes-related events over the past 24-72 hours  Admission glucose 1341; Anion gap 36 at admission; started on glucostabilizer    Assessment and Plan   Nursing Diagnosis Risk for unstable blood glucose pattern   Nursing Intervention Domain 525 Decision-making Support   Nursing Interventions Examined current inpatient diabetes/blood glucose control   Explored factors facilitating and impeding inpatient management  Explored corrective strategies with patient and responsible inpatient provider   Informed patient of rational for insulin strategy while hospitalized       Evaluation   This 47year old female patient with Type 1 diabetes did not achieve Bg control prior to admission as evidenced by an A1c of 10.9%. The patient reportedly uses an insulin pump at home but had turned it off for a couple days. The patient was brought to the hospital with AMS and was found to be in DKA. She is on glucostabilizer and is requiring a significant amount of insulin per hour. Her most recent rate change is 25.6 units per hour on glucostabilizer. Her gap remains open. The patient was unresponsive to name and touch. I suspect the patient will need to remain on glucostabilizer today. Diabetes management will continue to follow with the patient, monitoring BG trends and chemistry labs. The patient gap is closed this morning. She is using <3 units insulin per on glucostabilizer. I will order a transition dose of 18 units Lantus ( 0.2xkg).  The patient may require more and once eating consistently she will  likely need scheduled meal time . The patient was using about 1.4 units insulin per hour on her insulin pump according to her most recent endocrinology office visit, therefore it is likely she will require an increase in the basal insulin dose. I will monitir overnight BG trends and make further recommendation/changes in the morning. .    Orders/Plan   Transition off glucostabilizer with 18 units Lantus    Billing Code(s)   [] 76126 IP subsequent hospital care - 50 minutes   [] 40945 IP subsequent hospital care - 35 minutes   [x] 17910 IP subsequent hospital care - 25 minutes     Before making these care recommendations, I personally reviewed the hospitalization record, including notes, laboratory & diagnostic data and current medications, and examined the patient at the bedside (circumstances permitting) before making care recommendations. More than fifty (50) percent of the time was spent in patient counseling and/or care coordination.   Total minutes: 25 minutes    TONY Moreno  Diabetes Clinical Nurse Specialist  [facility]  Access via [employer]

## 2023-03-07 NOTE — PROGRESS NOTES
Date of Consultation:  March 7, 2023      Chief Complaint   Patient presents with    Diabetes     Patient's family states he believes that she is having a problem with diabetes; pt is confused in triage, pt wears an insulin pump and removed it this morning per family. Pt's BG in triage reports HI on glucometer. Altered mental status       History of Present Illness:   Ratna Randall is a 47 y.o. female with history of CKD, type 1 diabetes on insulin pump, anxiety with panic attacks, PTSD, hypertension presenting with blood sugar of 1341 found to be in diabetic ketoacidosis. Neurology was consulted for altered mental status and rapid EEG showing 50% seizure burden. Interval events: Routine EEG unremarkable for seizures. This morning the patient appears significantly improved in terms of cognition and wakefulness. She is able to answer questions and maintain attention on examiner. She is still mildly confused but is significantly improved compared to yesterday.       Past Medical History:   Diagnosis Date    Achilles tendon rupture     along with torn right patellar/femoral ligament    Arthritis     rt. foot    Chronic kidney disease     Chronic pain     abdominal pain    Diabetes (HCC)     IDDM on insulin pump and sensor    DM type 1 (diabetes mellitus, type 1) ([facility] [address]     age 25    Endometriosis     s/p ex-lap 4x    Gastric ulcer     GERD (gastroesophageal reflux disease)     Herpes     Other and unspecified hyperlipidemia     Panic attacks     Psychiatric disorder     anxiety, panic attacks    PTSD (post-traumatic stress disorder)     Unspecified essential hypertension         Past Surgical History:   Procedure Laterality Date    HX COLONOSCOPY      HX GYN      2 d&c's    HX GYN      laparoscopies x5 for endometriosis    HX GYN      mirena in place    HX ORTHOPAEDIC  2002    achiles tendon repair,talus repair    HX ORTHOPAEDIC      injections x2 to right shoulder    HX ORTHOPAEDIC Left 02/07/2019    3rd trigger finger release    SC CARDIAC SURG PROCEDURE UNLIST          Family History   Problem Relation Age of Onset    Diabetes Mother         diet controlled    Diabetes Father         R foot amupation    Liver Disease Father     Heart Disease Paternal Uncle         MI in his 46s    Stroke Neg Hx         Social History     Tobacco Use    Smoking status: Every Day     Packs/day: 0.50     Years: 28.00     Pack years: 14.00     Types: Cigarettes    Smokeless tobacco: Current    Tobacco comments:     Decreased to a half a pack    Substance Use Topics    Alcohol use: Not Currently     Comment: a beer every few months        Allergies   Allergen Reactions    Metolazone Other (comments)     Caused hyponatremia    Zocor [Simvastatin] Myalgia    Lisinopril Cough    Medrol [Methylprednisolone] Other (comments)     Caused severe hyperglycemia with the medrol pack        Prior to Admission medications    Medication Sig Start Date End Date Taking? Authorizing Provider   ALPRAZolam (Xanax) 2 mg tablet Take 2 mg by mouth every eight (8) hours. Provider, Historical   butalbital-acetaminophen-caffeine (FIORICET, ESGIC) -40 mg per tablet  2/13/23   Provider, Historical   albuterol (PROVENTIL HFA, VENTOLIN HFA, PROAIR HFA) 90 mcg/actuation inhaler  2/28/23   Provider, Historical   citalopram (CELEXA) 40 mg tablet  2/7/23   Provider, Historical   Nyamyc powder  2/13/23   Provider, Historical   famotidine (PEPCID) 40 mg tablet  1/27/23   Provider, Historical   metoclopramide HCl (REGLAN) 5 mg tablet Take 1 Tablet by mouth two (2) times a day. 12/12/22   Alba Persaud MD   HumaLOG U-100 Insulin 100 unit/mL injection USE AS DIRECTED FOR INSULIN PUMP.  UNITS PER DAY. DX E10.65--replaces novolog 10/26/22   Alba Persaud MD   carvediloL (COREG) 6.25 mg tablet TAKE ONE TABLET BY MOUTH DAILY 9/15/22   Alba Persaud MD   cefpodoxime [place]) 100 mg tablet daily.  9/11/22   Provider, Historical   glucagon (Glucagon Emergency Kit, human,) 1 mg solr USE AS DIRECTED IN KIT INSTRUCTIONS 6/24/22   Laura Moore MD   gabapentin (NEURONTIN) 600 mg tablet Take 1 Tablet by mouth four (4) times daily. Max Daily Amount: 2,400 mg. Replaces the 300 mg caps 6/16/22   Laura Moore MD   buPROPion SR [facility] SR) 100 mg SR tablet daily. 4/20/22   Alisa Marin MD   spironolactone (ALDACTONE) 25 mg tablet 50 mg daily. 5/10/22   Alisa Marin MD   naloxone (Narcan) 4 mg/actuation nasal spray Use 1 spray intranasally, then discard. Repeat with new spray every 2 min as needed for opioid overdose symptoms, alternating nostrils. 5/14/22   Cody Garza MD   BD Insulin Syringe Ultra-Fine 0.5 mL 31 gauge x 5/16\" syrg USE ONE SYRINGE - FOUR TIMES A DAY 4/2/22   Laura Moore MD   mirtazapine (REMERON) 7.5 mg tablet Take 15 mg by mouth daily. 3/4/22   Provider, Historical   morphine CR (MS CONTIN) 100 mg CR tablet Take 1 Tablet by mouth two (2) times a day. 3/7/22   Provider, Historical   [email or web address] Use to test up to 8 times per day. DX Code: 10.65--delete prescription for softclix sent in error  Patient not taking: Reported on 12/12/2022 9/27/21   Laura Moore MD   [employer] test strips strip Use as directed to test up to 8 times per day. Dx E10.65  Patient not taking: Reported on 12/12/2022 9/21/21   Laura Moore MD   aspirin delayed-release 81 mg tablet Take 81 mg by mouth daily. Provider, Historical   diclofenac (VOLTAREN) 1 % gel Apply 2 g to affected area four (4) times daily as needed for Pain. RT KNEE    Provider, Historical   oxyCODONE IR (ROXICODONE) 5 mg immediate release tablet Take 5 mg by mouth every four (4) hours as needed for Pain. Provider, Historical   rosuvastatin (CRESTOR) 40 mg tablet Take 40 mg by mouth daily. Provider, Historical   brexpiprazole (REXULTI) 0.5 mg tab tablet Take 1 mg by mouth two (2) times a day.     Provider, Historical busPIRone (BUSPAR) 10 mg tablet Take 20 mg by mouth two (2) times a day. Provider, Historical   fluticasone propionate (FLONASE) 50 mcg/actuation nasal spray 2 Sprays by Both Nostrils route daily as needed for Rhinitis. 9/10/20   Lino Gonsalez III, DO   bumetanide (BUMEX) 2 mg tablet Take 2 mg by mouth two (2) times a day. 7/26/20   Michael Collins MD   insulin pump (PATIENT SUPPLIED) misc by SubCUTAneous route as needed. Provider, Historical   levonorgestreL (MIRENA) 20 mcg/24 hours (7 yrs) 52 mg IUD 1 Device by IntraUTERine route once. Provider, Historical       Review of Systems:    General, constitutional: negative  Eyes, vision: negative  Ears, nose, throat: negative  Cardiovascular, heart: negative  Respiratory: negative  Gastrointestinal: negative  Genitourinary: negative  Musculoskeletal: negative  Skin and integumentary: negative  Psychiatric: negative  Endocrine: negative  Neurological: negative, except for HPI  Hematologic/lymphatic: negative  Allergy/immunology: negative    []Unable to obtain  ROS due to  []mental status change  []sedated   []intubated      PHYSICAL EXAMINATION:   Visit Vitals  BP (!) 151/75 (BP 1 Location: Right upper arm, BP Patient Position: At rest)   Pulse (!) 104   Temp 98.3 °F (36.8 °C)   Resp (!) 45   Ht 5' 4\" (1.626 m)   Wt 214 lb 11.7 oz (97.4 kg)   SpO2 96%   Breastfeeding No   BMI 36.86 kg/m²       Physical Exam:  General:  no acute distress  Neck: supple no mass   Lungs: Comfortable on room air  Heart: Well-perfused  Lower extremity: no edema    Neurological exam:  Mental Status: Awake, alert, oriented to person, thinks that she is in [facility], unable to tell me the month or year. Attention and Concentration intermittently impaired  Speech and Language: No dysarthria.  Able to name, repeat and follow commands     Cranial nerves: II-XII:  Pupils equal and reactive, visual fields intact by confrontation   Extraocular movements intact, no evidence of nystagmus or ptosis   Facial sensation intact to light touch  Facial movements symmetric; no facial droop  Hearing intact to soft rub bilaterally   Shoulder shrug symmetric and strong   Tongue protrusion full and midline without fasciculation or atrophy    Motor:   Normal tone and Bulk   Drift: No evidence of pronator drift   Finger tapping equal and symmetric  Mild postural tremor in bilateral upper extremities. Strength testing:   deltoid triceps biceps Wrist ext. Wrist flex. intrinsics   Right 5 5 5 5 5 5   Left 5 5 5 5 5 5      Hip flex. Hip ext. Knee ext. Knee flex Dorsi flex Plantar flex   Right  5 NT 5 5 5 5   Left  5 NT 5 5 5 5       Sensory:  Intact to light touch throughout     Reflexes:   Biceps Triceps  Brachiorad Patellar Achilles Plantar Hoffmans   Right  2 2 2 2 2 Down NT   Left  2 2 2 2 2 Down NT     Cerebellar testing:  No dysmetria. Normal rapid alternating movements; finger-to-nose and heel-to- shin testing are within normal limits. Data:     Lab Results   Component Value Date/Time    Hemoglobin A1c 10.9 (H) 01/18/2023 12:00 AM    Sodium 149 (H) 03/07/2023 08:02 AM    Potassium 3.6 03/07/2023 08:02 AM    Chloride 122 (H) 03/07/2023 08:02 AM    Glucose 238 (H) 03/07/2023 08:02 AM    BUN 33 (H) 03/07/2023 08:02 AM    Creatinine 1.85 (H) 03/07/2023 08:02 AM    Calcium 8.0 (L) 03/07/2023 08:02 AM    WBC 15.7 (H) 03/07/2023 03:52 AM    HCT 31.9 (L) 03/07/2023 03:52 AM    HGB 10.1 (L) 03/07/2023 03:52 AM    PLATELET 521 54/04/7438 03:52 AM    LDL,Direct 64 10/22/2014 11:30 AM    Hemoglobin A1c, External 10.6 09/18/2019 12:00 AM       Imaging:    Results from Hospital Encounter encounter on 11/20/17    MRI BRAIN WO CONT    Narrative  INDICATION:  seizure    COMPARISON:  Head CT of 11/20/2017    TECHNIQUE:  MR imaging of the brain was performed with sagittal T1, axial T1,  T2, FLAIR, GRE, DWI/ADC; coronal T2.  Coronal FLAIR images of the brain, thin  section high-resolution coronal T2-weighted images of the temporal lobes. FINDINGS:    Study mildly compromised by patient motion. DIFFUSION: No acute infarction. VENTRICLES: Midline, no hydrocephalus. Mild prominence of the ventricles and  cortical sulci, consistent with cerebral volume loss. Philbert Mustapha BRAIN PARENCHYMA/BRAINSTEM: No focal lesions. . No imaging evidence of mesial  temporal sclerosis. INTRACRANIAL HEMORRHAGE:  None. BASAL CISTERNS:  Patent. FLOW VOIDS: Normal.  PARANASAL SINUSES: Clear  CRANIOCERVICAL JUNCTION: Normal  ADDITIONAL COMMENTS: N/A. Impression  IMPRESSION:  No acute findings or focal structural lesions. Mild generalized volume loss. .      Results from [place] encounter on 03/17/20    CTA CHEST W OR W WO CONT    Narrative  INDICATION:   Hypoxia    COMPARISON: None. TECHNIQUE:  Precontrast  images were obtained to localize the volume for acquisition. Multislice helical CT arteriography was performed from the diaphragm to the  thoracic inlet during uneventful rapid bolus intravenous administration of 100  mL of Isovue-370. Lung and soft tissue windows were generated. Post processing  was performed and coronal reformatted images were also generated. 3 D imaging  was performed. CT dose reduction was achieved through use of a standardized  protocol tailored for this examination and automatic exposure control for dose  modulation. FINDINGS:  Lungs are clear of an acute process. There are small subpleural cystic changes  predominantly in the upper lobes consistent with emphysema. There is scarring in  the right middle lobe. A small amount of mucus noted in the trachea and right  bronchus. No definite pulmonary embolus is identified. The main pulmonary artery is  borderline enlarged measuring 3.3 cm. There is no mediastinal or hilar adenopathy or mass. The aorta enhances normally  without evidence of aneurysm or dissection.     The visualized portions of the upper abdominal organs are normal.    Impression  IMPRESSION:  1. No definite pulmonary emboli identified. 2. There is scarring in the right middle lobe. 3. There are small subpleural cystic changes predominantly upper lobes  consistent with emphysema. 4. There is borderline enlargement of the main pulmonary artery. 5. There is a small amount of mucus in the trachea and right mainstem bronchus. IMPRESSION/RECOMMENDATIONS:  Lucero Gill is a 47 y.o. female who presents with altered mental status in the setting of diabetic ketoacidosis with tremulousness on exam concerning for possible seizure, rapid EEG showing 50% seizure burden, with routine EEG significant for encephalopathy and no seizures seen. Patient's altered mental status is due to hyperglycemia. She is of course at risk for seizures however with correction of metabolic derangements, I do expect her to improve. 3/7 patient reevaluated with significant improvement. Still has some mild confusion however I do expect her to make a favorable recovery. Recommendations:  - Would recommend to continue seizure precautions, IV Ativan for seizure lasting more than 2 minutes or IM Versed  - Continue to correct metabolic derangements and underlying source of sepsis      Thank you very much for this consultation. Neurology will sign off. I spent 60 minutes providing critical care to this acutely ill inpatient with > 50% of the time counseling as well as reviewing the patient's chart, notes, labs, medications and preparing documentation along with assisting in the coordination of care of the patient on the patient's hospital floor/unit.        Amaya Crocker,

## 2023-03-07 NOTE — PROGRESS NOTES
Critical Care Progress Note  Chelle Bernal MD          Date of Service:  3/7/2023  NAME:  Shayna Larry  :  1970  MRN:  [id number]      Subjective/Hospital course:      48 y/o with pmh of DM1, managed on insulin pump and anxiety presents to ED [facility] for cc of AMS. Per chart review pt discontinued her insulin pump earlier in the day. Upon arrival to ED, pt altered and tachypneic. Labs notable for WBC 21, lactic acid of 6.04, biacrb of 6, glucose of 1341, Cr. 2.48, VBG showed a pH of 7.11. Given 3 L IVF, insulin gtt, and broad spectrum antibiotics for possible aspiration PNA. ICU consulted for admission.  - concern for benzo withdrawal.  Started on ativan IV. Mental status improved this a.m. Assessment/Plan:   DKA  Gap now closed  Plan to transition off ggt with lantus and sliding scale   DM mangement following      Sepsis of unknown etiology  Procal elevated  Source unclear  Cultures pending  Continue abx     DEBORAH  -Cr 2.48 upon admission (up from 1.54), repeat 2.77 after IVF  - Cr now improving with good UOP  - electrolytes appropriate    AMS  - secondary to polypharmacy / opioid and benzo withdrawal  - slowly reintroduce home meds at low dose      I personally spent 35 minutes of critical care time. This is time spent at this critically ill patient's bedside actively involved in patient care as well as the coordination of care and discussions with the patient's family. This does not include any procedural time which has been billed separately.       Review of Systems:   ROS   No new complaints     Vital Signs:   Patient Vitals for the past 4 hrs:   BP Temp Pulse Resp SpO2   23 1100 (!) 160/78 -- (!) 108 (!) 49 97 %   23 1000 (!) 159/76 -- (!) 102 (!) 49 95 %   23 0900 (!) 169/81 -- (!) 106 (!) 50 95 %   23 0800 (!) 166/74 (!) 100.5 °F (38.1 °C) (!) 101 (!) 43 95 %        Intake/Output Summary (Last 24 hours) at 3/7/2023 1106  Last data filed at 3/7/2023 1100  Gross per 24 hour   Intake 4357.95 ml   Output 2217 ml   Net 2140.95 ml        Physical Examination:    Physical Exam  Awake, oriented to self  Mild tachycardia  CTA no wheeze       Labs and Imaging:   Reviewed.       Medications:     Current Facility-Administered Medications   Medication Dose Route Frequency    Vanc level  1 Each Other ONCE    ALPRAZolam (XANAX) tablet 0.5 mg  0.5 mg Oral Q8H PRN    bumetanide (BUMEX) tablet 2 mg  2 mg Oral BID    buPROPion ER (ZYBAN,BUPROBAN) tablet 150 mg  150 mg Oral DAILY    busPIRone (BUSPAR) tablet 10 mg  10 mg Oral BID    famotidine (PEPCID) tablet 20 mg  20 mg Oral DAILY    gabapentin (NEURONTIN) capsule 100 mg  100 mg Oral TID    metoclopramide HCl (REGLAN) tablet 5 mg  5 mg Oral BID    mirtazapine (REMERON) tablet 15 mg  15 mg Oral QHS    morphine ER (MS CONTIN) tablet 30 mg  30 mg Oral BID PRN    rosuvastatin (CRESTOR) tablet 40 mg  40 mg Oral DAILY    [START ON 3/8/2023] enoxaparin (LOVENOX) injection 40 mg  40 mg SubCUTAneous DAILY    insulin regular (NOVOLIN R, HUMULIN R) 100 Units in 0.9% sodium chloride 100 mL infusion  0-50 Units/hr IntraVENous TITRATE    insulin lispro (HUMALOG) injection   SubCUTAneous TIDAC    glucose chewable tablet 16 g  4 Tablet Oral PRN    glucagon (GLUCAGEN) injection 1 mg  1 mg IntraMUSCular PRN    dextrose 10% infusion 0-250 mL  0-250 mL IntraVENous PRN    piperacillin-tazobactam (ZOSYN) 3.375 g in 0.9% sodium chloride (MBP/ADV) 100 mL MBP  3.375 g IntraVENous Q8H    sodium chloride (NS) flush 5-40 mL  5-40 mL IntraVENous Q8H    sodium chloride (NS) flush 5-40 mL  5-40 mL IntraVENous PRN    polyethylene glycol (MIRALAX) packet 17 g  17 g Oral DAILY PRN    Vancomycin - pharmacy dosing   Other Rx Dosing/Monitoring    alcohol 62% (NOZIN) nasal  1 Ampule  1 Ampule Topical Q12H    doxycycline (VIBRAMYCIN) 100 mg in 0.9% sodium chloride (MBP/ADV) 100 mL MBP  100 mg IntraVENous Q12H    balsam peru-castor oiL (VENELEX) ointment   Topical BID    nystatin (MYCOSTATIN) 100,000 unit/gram powder   Topical Q12H    dextrose 5% infusion  75 mL/hr IntraVENous CONTINUOUS    acetaminophen (TYLENOL) suppository 650 mg  650 mg Rectal Q4H PRN    Or    acetaminophen (TYLENOL) tablet 650 mg  650 mg Oral Q4H PRN    sodium chloride (NS) flush 5-10 mL  5-10 mL IntraVENous PRN     ______________________________________________________________________  EXPECTED LENGTH OF STAY: - - -  ACTUAL LENGTH OF STAY:          1                 Rachelle Cho MD   Pulmonary/[facility]  [phone]

## 2023-03-07 NOTE — PROGRESS NOTES
1900: Bedside shift change report given to ALVARO Camacho (oncoming nurse) by Daymon Lefort, RN and Elizabeth Puente RN (offgoing nurse). Report included the following information SBAR.      2000: Assessment complete. Pt's significant other at bedside. PCT sitter at bedside. Pt very impulsive, restless, and attempting to exit the bed. Pt A&O x 1. Pt oriented to person. Pt disoriented to time, place, and situation. Pt opens eyes and moves all extremities, spontaneously, weakly, and to command. Pt has decreased command following. Pt's pupils are round, equal, and large. Pt has tremors on BUE. Pt febrile at 100.5F. Ice packs applied to pt, since it is too soon to administer PRN Tylenol. Pt is tachycardic and hypertensive. Radial and pedal pulses are palpable bilaterally. Pt has trace generalized edema. Pt is on 3.5L NC with scattered wheezing bilaterally. Pt is tachypneic with respirations in the 30s-50s. Pt has an obese, semi-soft abdomen with active bowel sounds. Pt voiding clear, yellow urine via [place]. Pt's skin is dry, warm with tattoos and excoriation around the groin/ thighs. Pt has excoriation in gluteal cleft. Wound consult placed. Pt has scattered bruising. Pt's toenails are thick. 2115Carokobi Palumbo, RT in room assessing pt. No wheezing heard. Pt's lung sounds are coarse bilaterally. 2215: Pt's temperature is continuing to increase. Andrew Macdonald NP notified. Order received for PRN Tylenol Q4H.     2230: PRN Rectal Tylenol administered. 0000: Reassessment complete. Pt A&O x 2. Pt oriented to person and place. Pt disoriented to time and situation. When asked to provide the year, pt replies \"hospital\" repeatedly. BMP and Mg labs drawn. Glucometer strip accidentally touched an ice pack near pt. Glucometer read based on condensation from ice pack. Inaccurate LO reading received. -- Glucometer strip replaced and blood glucose obtained. 0130: Pt's Na resulted as 153 and K remained the same at 3.6.  Pt continues to have tremors and dilated pupils. Eun Sandoval NP notified; new orders received to increase D5 gtt to 75 mL/hr, 10 mEq K, and a one-time dose of Dilaudid. 0400: Reassessment complete. Pt A&O x 1. Pt oriented to person. Pt disoriented to time, place, and situation. When provided a list of places (Home, [address] [place] [address]) Pt said \"[employer]\" repeatedly. PRN Rectal Tylenol administered for fever. 0530: Pt's K resulted as 3.5. Orders received for 2 runs of 10 mEq K per Eun Sandoval NP     0700: Bedside shift change report given to Anaya Browning RN and Mihaela Moreno RN (oncoming nurse) by Juanpablo Dimas RN (offgoing nurse). Report included the following information SBAR.

## 2023-03-07 NOTE — PROGRESS NOTES
0700- Bedside and Verbal shift change report given to Phill Cherry RN (oncoming nurse) by Melvin Alejandra (offgoing nurse). Report included the following information SBAR, Kardex, Intake/Output, MAR, and Cardiac Rhythm NS/ Sinus Tach . 0800- Shift assessment completed, patient is not trying as much to get out of bed compared to yesterday. See flow sheet for further  information. 1110- Interdisciplinary team rounds were held 3/7/2023 with the following team members:Care Management, Diabetes Treatment Specialist, Nursing, Pharmacy, and Physician and the patient. Plan of care discussed. See clinical pathway and/or care plan for interventions and desired outcomes. Goals of the Day:  Get off the insulin drip and improve nutrition status. 1131- Insulin drip stopped as well as the D5 infusion. 1200- Reassessment completed no changes noted. 1225- Patient received  30mg morphine PO per Dr. Naina Hopson orders for signs of opoid with drawl. Patient had another loose stool. - Dr. Marycarmen Black at bedside. 1900- Bedside and Verbal shift change report given to Janice JOHN (oncoming nurse) by Phill Cherry RN (offgoing nurse). Report included the following information SBAR, Kardex, Intake/Output, MAR, and Cardiac Rhythm NS .

## 2023-03-07 NOTE — PROGRESS NOTES
Transition of Care Plan:    RUR:15%  Disposition:home  Follow up appointments:to be scheduled   DME needed:none  Transportation at [facility] or means to access home:   yes      IM Medicare Letter:to be given prior to discharge   Is patient a [place] and connected with the [facility]?     no           Caregiver Contact: Jami Patel [phone]  Discharge Caregiver contacted prior to discharge? no  Care Conference needed?:    no             Previous HH/SNF/IPR: none    Reason for Admission:   DKA                    RUR Score:     15%             PCP: First and Last name:   Colt Brunson MD     Name of Practice:    Are you a current patient: Yes/No:    Approximate date of last visit:    Can you participate in a virtual visit if needed:     Do you (patient/family) have any concerns for transition/discharge? no                 Plan for utilizing home health:   no recommendations    Current Advanced Directive/Advance Care Plan:  Full Code      Healthcare Decision Maker:   Click here to complete [address] including selection of the Healthcare Decision Maker Relationship (ie \"Primary\")            Primary Decision MakerTateama Smith Krishna Devonhieu - [phone]    Secondary Decision Maker: Timmy Wheeler - [phone]    Transition of Care Plan:           CM confirmed pt's demographic information is up-to-date in the chart. Pt reported her preferred pharmacy as [facility]; no issues identified accessing/paying for medication. Patient has home oxygen through [facility]. Care Management Interventions  PCP Verified by CM: Yes  Palliative Care Criteria Met (RRAT>21 & CHF Dx)?: No  Mode of Transport at Discharge:  Other (see comment) (family)  Transition of Care Consult (CM Consult): Discharge Planning  MyChart Signup: No  Discharge Durable Medical Equipment: No  Health Maintenance Reviewed: Yes  Physical Therapy Consult: No  Occupational Therapy Consult: No  Speech Therapy Consult: No  Support Systems: Spouse/Significant Other, Friend/Neighbor  Confirm Follow Up Transport: Family  [employer] Information Provided?: No  Discharge Location  Patient Expects to be Discharged to[de-identified] Home with family assistance    7:40 PM  Giovany Barillas, CRM

## 2023-03-07 NOTE — WOUND CARE
Wound care consult for \"gluteal cleft and groin\". Chart reviewed and patient assessed. Pt. Is awake and on oxygen, also requiring a sitter. Pt. Was rolled to her right side. Assessment: the skin is pinker than the surrounding skin but it is blanchable, moist. No open wounds present and no blisters. Treatment Recommended: Keep the skin clean and dry. Barrier cream needed to protect from excess moisture. Turn patient every two hours to off load the sacrum. Float the heels.    Charan Dallas RN, BSN, [employer]

## 2023-03-07 NOTE — PROGRESS NOTES
P&T-Approved DVT Prophylaxis Dosing    Scr improved. Per P&T Committee-approved protocol enoxaparin has been adjusted to 40 mg based on weight and renal function as shown in the table below.          GEEG Roy

## 2023-03-08 ENCOUNTER — TELEPHONE (OUTPATIENT)
Dept: ENDOCRINOLOGY | Age: 53
End: 2023-03-08

## 2023-03-08 LAB
ADMINISTERED INITIALS, ADMINIT: NORMAL
ANION GAP SERPL CALC-SCNC: 17 MMOL/L (ref 5–15)
ANION GAP SERPL CALC-SCNC: 5 MMOL/L (ref 5–15)
ANION GAP SERPL CALC-SCNC: 6 MMOL/L (ref 5–15)
BASOPHILS # BLD: 0 K/UL (ref 0–0.1)
BASOPHILS NFR BLD: 0 % (ref 0–1)
BUN SERPL-MCNC: 22 MG/DL (ref 6–20)
BUN SERPL-MCNC: 23 MG/DL (ref 6–20)
BUN SERPL-MCNC: 25 MG/DL (ref 6–20)
BUN/CREAT SERPL: 14 (ref 12–20)
BUN/CREAT SERPL: 15 (ref 12–20)
BUN/CREAT SERPL: 16 (ref 12–20)
CALCIUM SERPL-MCNC: 7.3 MG/DL (ref 8.5–10.1)
CALCIUM SERPL-MCNC: 7.9 MG/DL (ref 8.5–10.1)
CALCIUM SERPL-MCNC: 8.1 MG/DL (ref 8.5–10.1)
CHLORIDE SERPL-SCNC: 107 MMOL/L (ref 97–108)
CHLORIDE SERPL-SCNC: 107 MMOL/L (ref 97–108)
CHLORIDE SERPL-SCNC: 110 MMOL/L (ref 97–108)
CO2 SERPL-SCNC: 18 MMOL/L (ref 21–32)
CO2 SERPL-SCNC: 26 MMOL/L (ref 21–32)
CO2 SERPL-SCNC: 29 MMOL/L (ref 21–32)
CREAT SERPL-MCNC: 1.48 MG/DL (ref 0.55–1.02)
CREAT SERPL-MCNC: 1.59 MG/DL (ref 0.55–1.02)
CREAT SERPL-MCNC: 1.6 MG/DL (ref 0.55–1.02)
D50 ADMINISTERED, D50ADM: 0 ML
D50 ORDER, D50ORD: 0 ML
DIFFERENTIAL METHOD BLD: ABNORMAL
EOSINOPHIL # BLD: 0.1 K/UL (ref 0–0.4)
EOSINOPHIL NFR BLD: 1 % (ref 0–7)
ERYTHROCYTE [DISTWIDTH] IN BLOOD BY AUTOMATED COUNT: 17.5 % (ref 11.5–14.5)
GLUCOSE BLD STRIP.AUTO-MCNC: 108 MG/DL (ref 65–117)
GLUCOSE BLD STRIP.AUTO-MCNC: 122 MG/DL (ref 65–117)
GLUCOSE BLD STRIP.AUTO-MCNC: 161 MG/DL (ref 65–117)
GLUCOSE BLD STRIP.AUTO-MCNC: 172 MG/DL (ref 65–117)
GLUCOSE BLD STRIP.AUTO-MCNC: 245 MG/DL (ref 65–117)
GLUCOSE BLD STRIP.AUTO-MCNC: 263 MG/DL (ref 65–117)
GLUCOSE BLD STRIP.AUTO-MCNC: 270 MG/DL (ref 65–117)
GLUCOSE BLD STRIP.AUTO-MCNC: 351 MG/DL (ref 65–117)
GLUCOSE BLD STRIP.AUTO-MCNC: 440 MG/DL (ref 65–117)
GLUCOSE BLD STRIP.AUTO-MCNC: 505 MG/DL (ref 65–117)
GLUCOSE BLD STRIP.AUTO-MCNC: 506 MG/DL (ref 65–117)
GLUCOSE BLD STRIP.AUTO-MCNC: NORMAL MG/DL (ref 65–117)
GLUCOSE SERPL-MCNC: 117 MG/DL (ref 65–100)
GLUCOSE SERPL-MCNC: 230 MG/DL (ref 65–100)
GLUCOSE SERPL-MCNC: 499 MG/DL (ref 65–100)
GLUCOSE, GLC: 108 MG/DL
GLUCOSE, GLC: 122 MG/DL
GLUCOSE, GLC: 161 MG/DL
GLUCOSE, GLC: 245 MG/DL
GLUCOSE, GLC: 270 MG/DL
GLUCOSE, GLC: 351 MG/DL
GLUCOSE, GLC: 440 MG/DL
GLUCOSE, GLC: 505 MG/DL
GLUCOSE, GLC: 506 MG/DL
HCT VFR BLD AUTO: 29.9 % (ref 35–47)
HGB BLD-MCNC: 9.4 G/DL (ref 11.5–16)
HIGH TARGET, HITG: 200 MG/DL
IMM GRANULOCYTES # BLD AUTO: 0.1 K/UL (ref 0–0.04)
IMM GRANULOCYTES NFR BLD AUTO: 1 % (ref 0–0.5)
INSULIN ADMINSTERED, INSADM: 12.6 UNITS/HOUR
INSULIN ADMINSTERED, INSADM: 13 UNITS/HOUR
INSULIN ADMINSTERED, INSADM: 13.4 UNITS/HOUR
INSULIN ADMINSTERED, INSADM: 14.6 UNITS/HOUR
INSULIN ADMINSTERED, INSADM: 15.2 UNITS/HOUR
INSULIN ADMINSTERED, INSADM: 2.2 UNITS/HOUR
INSULIN ADMINSTERED, INSADM: 3.5 UNITS/HOUR
INSULIN ADMINSTERED, INSADM: 7.1 UNITS/HOUR
INSULIN ADMINSTERED, INSADM: 8.9 UNITS/HOUR
INSULIN ORDER, INSORD: 12.6 UNITS/HOUR
INSULIN ORDER, INSORD: 13 UNITS/HOUR
INSULIN ORDER, INSORD: 13.4 UNITS/HOUR
INSULIN ORDER, INSORD: 14.6 UNITS/HOUR
INSULIN ORDER, INSORD: 15.2 UNITS/HOUR
INSULIN ORDER, INSORD: 2.2 UNITS/HOUR
INSULIN ORDER, INSORD: 3.5 UNITS/HOUR
INSULIN ORDER, INSORD: 7.1 UNITS/HOUR
INSULIN ORDER, INSORD: 8.9 UNITS/HOUR
LOW TARGET, LOT: 150 MG/DL
LYMPHOCYTES # BLD: 1.9 K/UL (ref 0.8–3.5)
LYMPHOCYTES NFR BLD: 20 % (ref 12–49)
MAGNESIUM SERPL-MCNC: 1.3 MG/DL (ref 1.6–2.4)
MAGNESIUM SERPL-MCNC: 2.1 MG/DL (ref 1.6–2.4)
MAGNESIUM SERPL-MCNC: 2.3 MG/DL (ref 1.6–2.4)
MCH RBC QN AUTO: 26.8 PG (ref 26–34)
MCHC RBC AUTO-ENTMCNC: 31.4 G/DL (ref 30–36.5)
MCV RBC AUTO: 85.2 FL (ref 80–99)
MINUTES UNTIL NEXT BG, NBG: 60 MIN
MONOCYTES # BLD: 0.6 K/UL (ref 0–1)
MONOCYTES NFR BLD: 6 % (ref 5–13)
MULTIPLIER, MUL: 0.02
MULTIPLIER, MUL: 0.03
MULTIPLIER, MUL: 0.04
MULTIPLIER, MUL: 0.04
MULTIPLIER, MUL: 0.05
MULTIPLIER, MUL: 0.06
MULTIPLIER, MUL: 0.06
MULTIPLIER, MUL: 0.07
MULTIPLIER, MUL: 0.07
NEUTS SEG # BLD: 6.8 K/UL (ref 1.8–8)
NEUTS SEG NFR BLD: 72 % (ref 32–75)
NRBC # BLD: 0 K/UL (ref 0–0.01)
NRBC BLD-RTO: 0 PER 100 WBC
ORDER INITIALS, ORDINIT: NORMAL
PHOSPHATE SERPL-MCNC: 1.1 MG/DL (ref 2.6–4.7)
PHOSPHATE SERPL-MCNC: 3.1 MG/DL (ref 2.6–4.7)
PLATELET # BLD AUTO: 175 K/UL (ref 150–400)
PMV BLD AUTO: 10.7 FL (ref 8.9–12.9)
POTASSIUM SERPL-SCNC: 3.1 MMOL/L (ref 3.5–5.1)
POTASSIUM SERPL-SCNC: 3.4 MMOL/L (ref 3.5–5.1)
POTASSIUM SERPL-SCNC: 3.5 MMOL/L (ref 3.5–5.1)
RBC # BLD AUTO: 3.51 M/UL (ref 3.8–5.2)
SERVICE CMNT-IMP: ABNORMAL
SERVICE CMNT-IMP: NORMAL
SERVICE CMNT-IMP: NORMAL
SODIUM SERPL-SCNC: 139 MMOL/L (ref 136–145)
SODIUM SERPL-SCNC: 142 MMOL/L (ref 136–145)
SODIUM SERPL-SCNC: 144 MMOL/L (ref 136–145)
WBC # BLD AUTO: 9.4 K/UL (ref 3.6–11)

## 2023-03-08 PROCEDURE — 99231 SBSQ HOSP IP/OBS SF/LOW 25: CPT

## 2023-03-08 PROCEDURE — 85025 COMPLETE CBC W/AUTO DIFF WBC: CPT

## 2023-03-08 PROCEDURE — 74011000258 HC RX REV CODE- 258: Performed by: NURSE PRACTITIONER

## 2023-03-08 PROCEDURE — 74011250637 HC RX REV CODE- 250/637: Performed by: INTERNAL MEDICINE

## 2023-03-08 PROCEDURE — 94640 AIRWAY INHALATION TREATMENT: CPT

## 2023-03-08 PROCEDURE — 84100 ASSAY OF PHOSPHORUS: CPT

## 2023-03-08 PROCEDURE — 80048 BASIC METABOLIC PNL TOTAL CA: CPT

## 2023-03-08 PROCEDURE — 36415 COLL VENOUS BLD VENIPUNCTURE: CPT

## 2023-03-08 PROCEDURE — 74011000258 HC RX REV CODE- 258: Performed by: INTERNAL MEDICINE

## 2023-03-08 PROCEDURE — 74011250636 HC RX REV CODE- 250/636: Performed by: NURSE PRACTITIONER

## 2023-03-08 PROCEDURE — 74011636637 HC RX REV CODE- 636/637: Performed by: NURSE PRACTITIONER

## 2023-03-08 PROCEDURE — 74011250637 HC RX REV CODE- 250/637: Performed by: NURSE PRACTITIONER

## 2023-03-08 PROCEDURE — 77010033678 HC OXYGEN DAILY

## 2023-03-08 PROCEDURE — 74011636637 HC RX REV CODE- 636/637: Performed by: INTERNAL MEDICINE

## 2023-03-08 PROCEDURE — 74011000250 HC RX REV CODE- 250: Performed by: INTERNAL MEDICINE

## 2023-03-08 PROCEDURE — 65610000006 HC RM INTENSIVE CARE

## 2023-03-08 PROCEDURE — 74011250636 HC RX REV CODE- 250/636: Performed by: INTERNAL MEDICINE

## 2023-03-08 PROCEDURE — 83735 ASSAY OF MAGNESIUM: CPT

## 2023-03-08 PROCEDURE — 74011636637 HC RX REV CODE- 636/637

## 2023-03-08 PROCEDURE — 82962 GLUCOSE BLOOD TEST: CPT

## 2023-03-08 PROCEDURE — 74011000250 HC RX REV CODE- 250: Performed by: NURSE PRACTITIONER

## 2023-03-08 RX ORDER — INSULIN GLARGINE 100 [IU]/ML
34 INJECTION, SOLUTION SUBCUTANEOUS DAILY
Status: DISCONTINUED | OUTPATIENT
Start: 2023-03-08 | End: 2023-03-09

## 2023-03-08 RX ORDER — MAGNESIUM SULFATE 1 G/100ML
1 INJECTION INTRAVENOUS ONCE
Status: COMPLETED | OUTPATIENT
Start: 2023-03-08 | End: 2023-03-08

## 2023-03-08 RX ORDER — INSULIN LISPRO 100 [IU]/ML
6 INJECTION, SOLUTION INTRAVENOUS; SUBCUTANEOUS
Status: DISCONTINUED | OUTPATIENT
Start: 2023-03-08 | End: 2023-03-10 | Stop reason: HOSPADM

## 2023-03-08 RX ORDER — IPRATROPIUM BROMIDE AND ALBUTEROL SULFATE 2.5; .5 MG/3ML; MG/3ML
3 SOLUTION RESPIRATORY (INHALATION)
Status: COMPLETED | OUTPATIENT
Start: 2023-03-08 | End: 2023-03-08

## 2023-03-08 RX ORDER — IBUPROFEN 200 MG
4 TABLET ORAL AS NEEDED
Status: DISCONTINUED | OUTPATIENT
Start: 2023-03-08 | End: 2023-03-08

## 2023-03-08 RX ORDER — SODIUM CHLORIDE, SODIUM LACTATE, POTASSIUM CHLORIDE, CALCIUM CHLORIDE 600; 310; 30; 20 MG/100ML; MG/100ML; MG/100ML; MG/100ML
100 INJECTION, SOLUTION INTRAVENOUS CONTINUOUS
Status: DISCONTINUED | OUTPATIENT
Start: 2023-03-08 | End: 2023-03-08

## 2023-03-08 RX ORDER — POTASSIUM CHLORIDE 29.8 MG/ML
20 INJECTION INTRAVENOUS
Status: COMPLETED | OUTPATIENT
Start: 2023-03-08 | End: 2023-03-08

## 2023-03-08 RX ORDER — DEXTROSE MONOHYDRATE 50 MG/ML
50 INJECTION, SOLUTION INTRAVENOUS CONTINUOUS
Status: DISCONTINUED | OUTPATIENT
Start: 2023-03-08 | End: 2023-03-08

## 2023-03-08 RX ORDER — IPRATROPIUM BROMIDE AND ALBUTEROL SULFATE 2.5; .5 MG/3ML; MG/3ML
3 SOLUTION RESPIRATORY (INHALATION)
Status: DISCONTINUED | OUTPATIENT
Start: 2023-03-08 | End: 2023-03-10 | Stop reason: HOSPADM

## 2023-03-08 RX ORDER — INSULIN LISPRO 100 [IU]/ML
INJECTION, SOLUTION INTRAVENOUS; SUBCUTANEOUS
Status: DISCONTINUED | OUTPATIENT
Start: 2023-03-08 | End: 2023-03-10 | Stop reason: HOSPADM

## 2023-03-08 RX ORDER — MAGNESIUM SULFATE HEPTAHYDRATE 40 MG/ML
2 INJECTION, SOLUTION INTRAVENOUS ONCE
Status: COMPLETED | OUTPATIENT
Start: 2023-03-08 | End: 2023-03-08

## 2023-03-08 RX ORDER — DEXTROSE MONOHYDRATE 100 MG/ML
0-250 INJECTION, SOLUTION INTRAVENOUS AS NEEDED
Status: DISCONTINUED | OUTPATIENT
Start: 2023-03-08 | End: 2023-03-08

## 2023-03-08 RX ADMIN — Medication 2 UNITS: at 18:14

## 2023-03-08 RX ADMIN — ACETAMINOPHEN 325MG 650 MG: 325 TABLET ORAL at 14:28

## 2023-03-08 RX ADMIN — MAGNESIUM SULFATE HEPTAHYDRATE 1 G: 1 INJECTION, SOLUTION INTRAVENOUS at 06:42

## 2023-03-08 RX ADMIN — METOCLOPRAMIDE 5 MG: 10 TABLET ORAL at 08:32

## 2023-03-08 RX ADMIN — SODIUM CHLORIDE, POTASSIUM CHLORIDE, SODIUM LACTATE AND CALCIUM CHLORIDE 100 ML/HR: 600; 310; 30; 20 INJECTION, SOLUTION INTRAVENOUS at 06:36

## 2023-03-08 RX ADMIN — MIRTAZAPINE 15 MG: 15 TABLET, FILM COATED ORAL at 21:29

## 2023-03-08 RX ADMIN — NYSTATIN: 100000 POWDER TOPICAL at 08:38

## 2023-03-08 RX ADMIN — CASTOR OIL AND BALSAM, PERU: 788; 87 OINTMENT TOPICAL at 08:37

## 2023-03-08 RX ADMIN — METOCLOPRAMIDE 5 MG: 10 TABLET ORAL at 18:15

## 2023-03-08 RX ADMIN — BUSPIRONE HYDROCHLORIDE 10 MG: 5 TABLET ORAL at 18:15

## 2023-03-08 RX ADMIN — DEXTROSE MONOHYDRATE 50 ML/HR: 50 INJECTION, SOLUTION INTRAVENOUS at 13:08

## 2023-03-08 RX ADMIN — POTASSIUM CHLORIDE 20 MEQ: 29.8 INJECTION, SOLUTION INTRAVENOUS at 08:29

## 2023-03-08 RX ADMIN — DOXYCYCLINE 100 MG: 100 INJECTION, POWDER, LYOPHILIZED, FOR SOLUTION INTRAVENOUS at 21:29

## 2023-03-08 RX ADMIN — SODIUM CHLORIDE, PRESERVATIVE FREE 40 ML: 5 INJECTION INTRAVENOUS at 13:09

## 2023-03-08 RX ADMIN — VANCOMYCIN HYDROCHLORIDE 1250 MG: 10 INJECTION, POWDER, LYOPHILIZED, FOR SOLUTION INTRAVENOUS at 00:29

## 2023-03-08 RX ADMIN — BUMETANIDE 2 MG: 1 TABLET ORAL at 18:15

## 2023-03-08 RX ADMIN — DOXYCYCLINE 100 MG: 100 INJECTION, POWDER, LYOPHILIZED, FOR SOLUTION INTRAVENOUS at 10:10

## 2023-03-08 RX ADMIN — POTASSIUM CHLORIDE 20 MEQ: 29.8 INJECTION, SOLUTION INTRAVENOUS at 06:44

## 2023-03-08 RX ADMIN — SODIUM CHLORIDE, PRESERVATIVE FREE 10 ML: 5 INJECTION INTRAVENOUS at 21:30

## 2023-03-08 RX ADMIN — SODIUM CHLORIDE 8.9 UNITS/HR: 9 INJECTION, SOLUTION INTRAVENOUS at 06:50

## 2023-03-08 RX ADMIN — NYSTATIN: 100000 POWDER TOPICAL at 21:31

## 2023-03-08 RX ADMIN — BUPROPION HYDROCHLORIDE 150 MG: 150 TABLET, EXTENDED RELEASE ORAL at 08:33

## 2023-03-08 RX ADMIN — Medication 1 AMPULE: at 08:31

## 2023-03-08 RX ADMIN — ACETAMINOPHEN 325MG 650 MG: 325 TABLET ORAL at 08:43

## 2023-03-08 RX ADMIN — GABAPENTIN 100 MG: 100 CAPSULE ORAL at 18:15

## 2023-03-08 RX ADMIN — BUSPIRONE HYDROCHLORIDE 10 MG: 5 TABLET ORAL at 08:32

## 2023-03-08 RX ADMIN — IPRATROPIUM BROMIDE AND ALBUTEROL SULFATE 3 ML: 2.5; .5 SOLUTION RESPIRATORY (INHALATION) at 17:19

## 2023-03-08 RX ADMIN — SODIUM CHLORIDE, PRESERVATIVE FREE 10 ML: 5 INJECTION INTRAVENOUS at 06:54

## 2023-03-08 RX ADMIN — PIPERACILLIN AND TAZOBACTAM 3.38 G: 3; .375 INJECTION, POWDER, LYOPHILIZED, FOR SOLUTION INTRAVENOUS at 10:56

## 2023-03-08 RX ADMIN — ENOXAPARIN SODIUM 40 MG: 100 INJECTION SUBCUTANEOUS at 08:32

## 2023-03-08 RX ADMIN — Medication 3 UNITS: at 21:29

## 2023-03-08 RX ADMIN — FAMOTIDINE 20 MG: 20 TABLET, FILM COATED ORAL at 08:33

## 2023-03-08 RX ADMIN — SODIUM CHLORIDE 3.5 UNITS/HR: 9 INJECTION, SOLUTION INTRAVENOUS at 14:20

## 2023-03-08 RX ADMIN — PIPERACILLIN AND TAZOBACTAM 3.38 G: 3; .375 INJECTION, POWDER, LYOPHILIZED, FOR SOLUTION INTRAVENOUS at 02:00

## 2023-03-08 RX ADMIN — ROSUVASTATIN CALCIUM 40 MG: 40 TABLET, FILM COATED ORAL at 08:33

## 2023-03-08 RX ADMIN — CEFTRIAXONE 1 G: 1 INJECTION, POWDER, FOR SOLUTION INTRAMUSCULAR; INTRAVENOUS at 14:31

## 2023-03-08 RX ADMIN — CASTOR OIL AND BALSAM, PERU: 788; 87 OINTMENT TOPICAL at 21:31

## 2023-03-08 RX ADMIN — POTASSIUM PHOSPHATE, MONOBASIC AND POTASSIUM PHOSPHATE, DIBASIC: 224; 236 INJECTION, SOLUTION, CONCENTRATE INTRAVENOUS at 13:09

## 2023-03-08 RX ADMIN — INSULIN GLARGINE 34 UNITS: 100 INJECTION, SOLUTION SUBCUTANEOUS at 14:55

## 2023-03-08 RX ADMIN — Medication 6 UNITS: at 18:15

## 2023-03-08 RX ADMIN — MAGNESIUM SULFATE HEPTAHYDRATE 2 G: 40 INJECTION, SOLUTION INTRAVENOUS at 08:28

## 2023-03-08 RX ADMIN — POTASSIUM BICARBONATE 20 MEQ: 782 TABLET, EFFERVESCENT ORAL at 13:08

## 2023-03-08 RX ADMIN — GABAPENTIN 100 MG: 100 CAPSULE ORAL at 21:29

## 2023-03-08 RX ADMIN — GABAPENTIN 100 MG: 100 CAPSULE ORAL at 08:33

## 2023-03-08 RX ADMIN — Medication 1 AMPULE: at 21:30

## 2023-03-08 RX ADMIN — BUMETANIDE 2 MG: 1 TABLET ORAL at 08:33

## 2023-03-08 NOTE — DIABETES MGMT
[address] [facility]    CLINICAL NURSE SPECIALIST CONSULT     Initial Presentation   Felipe Bowie is a 47 y.o. female admitted 3/6/2023 after experiencing AMS and hyperglycemia. The patient reportedly stopped using her insulin pump a couple days ago. LAB: Glucose 1341. Creatine 1.54. GFR 23. Anion gap 36. A1c 0.9% (1/18/2023). CO2 6  CXR:Study Result    Narrative & Impression   EXAM:  XR CHEST PORT     INDICATION: Altered mental status. COMPARISON: 6/30/2022     TECHNIQUE: Portable AP chest view at 2155 hours     FINDINGS: The cardiomediastinal contours are stable. The pulmonary vasculature  is within normal limits. There are increased bilateral perihilar and decreased bibasilar opacities  compared to 6/30/2022. There is no pleural effusion or pneumothorax. The bones  and upper abdomen are stable. IMPRESSION     Increased bilateral perihilar and decreased bibasilar opacities compared to  6/30/2022 may represent atelectasis, edema, infection, or aspiration     CT/MRI:    HX:   Past Medical History:   Diagnosis Date    Achilles tendon rupture     along with torn right patellar/femoral ligament    Arthritis     rt. foot    Chronic kidney disease     Chronic pain     abdominal pain    Diabetes (HCC)     IDDM on insulin pump and sensor    DM type 1 (diabetes mellitus, type 1) ([facility] [address]     age 25    Endometriosis     s/p ex-lap 4x    Gastric ulcer     GERD (gastroesophageal reflux disease)     Herpes     Other and unspecified hyperlipidemia     Panic attacks     Psychiatric disorder     anxiety, panic attacks    PTSD (post-traumatic stress disorder)     Unspecified essential hypertension         INITIAL DX:   DKA (diabetic ketoacidosis) ([facility] [address] [E11.10]     Current Treatment     TX: ABX. Clot prevention.      Consulted by Provider for advanced diabetes nursing assessment and care for:   [x] Transitioning off Sepulveda Flavors   [x] Inpatient management strategy  [x] Home management assessment  [] Survival skill education    Hospital Course   Clinical progress has been complicated by DKA.   5/9/2313 The patient is awake and alert and awake but remains unresponsive to questions or other verbal cues. 3/8/2023 The patient is sitting up in the chair and speaking on the phone. She is alert and oriented x4. Speech is clear. Diabetes History   The patient has a hx of Type 1 diabetes with nephropathy and follows with endocrinologist Danielito Bryant M.D. Current settings are as follows:  - basal: 12a: 1.4  - Carb ratio: 12a: 10, 11a: 10. 5p: 10  - sensitivity: 28  - target: 12a: 130-150  - active insulin time: 3 hours    Diabetes-related Medical History  Acute complications  DKA  Microvascular disease  Nephropathy  Other associated conditions     Anxiety, PTSD    Diabetes Medication History  Key Antihyperglycemic Medications               HumaLOG U-100 Insulin 100 unit/mL injection USE AS DIRECTED FOR INSULIN PUMP.  UNITS PER DAY. DX E10.65--replaces novolog    insulin pump (PATIENT SUPPLIED) misc by SubCUTAneous route as needed. Diabetes self-management practices:   Eating pattern Deferred     Physical activity pattern Deferred    Monitoring pattern   [x] Using CGM     Taking medications pattern  Using insulin pump PTA  Social determinants of health impacting diabetes self-management practices   Struggling with anxiety and/or depression  Overall evaluation:    [x] Not achieving A1c target with drug therapy & self-care practices    Subjective    \" I should have never taken my pump off\"     Objective   Physical exam  General Obese female; ill-appearing. Neuro  Not alert   Vital Signs Visit Vitals  /80   Pulse (!) 108   Temp 99.3 °F (37.4 °C)   Resp (!) 41   Ht 5' 4\" (1.626 m)   Wt 93.8 kg (206 lb 12.7 oz)   SpO2 97%   Breastfeeding No   BMI 35.50 kg/m²     s +2.      Laboratory  Recent Labs     03/08/23  4900 03/07/23  0802 03/07/23  6545 03/06/23  0818 03/06/23  0502 03/06/23  0241 03/05/23  2238   * 238* 216*   < > 815*   < > 1,341*   AGAP 17* 5 7   < > 24*   < > 36*   WBC 9.4  --  15.7*  --  20.2*  --  21.0*   CREA 1.60* 1.85* 1.95*   < > 2.77*   < > 2.48*   AST  --   --   --   --   --   --  38*   ALT  --   --   --   --   --   --  42    < > = values in this interval not displayed. Factors impacting BG management  Factor Dose Comments   Nutrition:     60 gm/meal NPO   Pain PRN acetaminophen suppository    Infection Doxycycline  Zosyn    Other:   Kidney function  Liver function     GFR 39  AST 38      Blood glucose pattern      Significant diabetes-related events over the past 24-72 hours  Admission glucose 1341; Anion gap 36 at admission; started on glucostabilizer    Assessment and Plan   Nursing Diagnosis Risk for unstable blood glucose pattern   Nursing Intervention Domain 5257 Decision-making Support   Nursing Interventions Examined current inpatient diabetes/blood glucose control   Explored factors facilitating and impeding inpatient management  Explored corrective strategies with patient and responsible inpatient provider   Informed patient of rational for insulin strategy while hospitalized       Evaluation   This 47year old female patient with Type 1 diabetes did not achieve Bg control prior to admission as evidenced by an A1c of 10.9%. The patient reportedly uses an insulin pump at home but had turned it off for a couple days. The patient was brought to the hospital with AMS and was found to be in DKA. She is on glucostabilizer and is requiring a significant amount of insulin per hour. Her most recent rate change is 25.6 units per hour on glucostabilizer. Her gap remains open. The patient was unresponsive to name and touch. I suspect the patient will need to remain on glucostabilizer today. Diabetes management will continue to follow with the patient, monitoring BG trends and chemistry labs. The patient gap is closed this morning.  She is using <3 units insulin per on glucostabilizer. I will order a transition dose of 18 units Lantus ( 0.2xkg). The patient may require more and once eating consistently she will  likely need scheduled meal time . The patient was using about 1.4 units insulin per hour on her insulin pump according to her most recent endocrinology office visit, therefore it is likely she will require an increase in the basal insulin dose. I will monitir overnight BG trends and make further recommendation/changes in the morning. BG's pilo in the early morning. Anion Gap 17 and patient placed back on glucostabilizer. The patient will need to remain on glucostabilzer unril gap is closed. I recommend that once transitioned off to use TDD from insulin pump ( 1.4 units per hour) Lantus 34 units. Once eating the patient should use 1:10 ratio for meals. The patient reports that she had a good control with her insulin pump prior to admission, but took off her pump 2 days prior to admission. The patient admits that she has Tyoe 1 diabetes and that she requires insulin. She states that she doses not know why she took herinsulin pump off and denies tryi g to harm herself. Update 3/8/2023 at 1330 : Spoke with endocrinologist Bonifacio Krishna M.D. regarding patient. Dr. Rubia Evans sees her outpatient. He agrees with transition dose of 34 units Lantus. No additional basal dose recommended to be given this evening per Dr. Rubia Evans via phone call. Scheduled Humalog ordered at 6 units insulin per consumed  meal of 60 gm carbs.      .    Orders/Plan    34 units transition dose  Humalog 6 units with each meal   Normal sensitivity correction insulin     Billing Code(s)   []        32577  IP subsequent hospital care - 50 minutes        []        62209  IP subsequent hospital care - 35 minutes        [x]        86665  IP subsequent hospital care - 25 minutes           Before making these care recommendations, I personally reviewed the hospitalization record, including notes, laboratory & diagnostic data and current medications, and examined the patient at the bedside (circumstances permitting) before making care recommendations. More than fifty (50) percent of the time was spent in patient counseling and/or care coordination.   Total minutes: 25 minutes    TONY Kennedy  Diabetes Clinical Nurse Specialist  [facility]  Access via [employer]

## 2023-03-08 NOTE — TELEPHONE ENCOUNTER
Please call Ryley Escobar to let him know I am aware that Shiela Liriano is in the hospital and I'm happy to stop by tomorrow to see her but I'm at [facility] today. I just spoke to Breanne Tinoco, who is the diabetes nurse specialist, who is helping manage her diabetes and we discussed a plan for taking Shiela Liriano off the insulin drip this afternoon with a higher dose of lantus as yesterday she was not given enough lantus and this caused her sugars to go back up overnight. Byron Dallas will contact me about her management if any questions arise.

## 2023-03-08 NOTE — TELEPHONE ENCOUNTER
Sue NINO at 10:42 AM stating pt is in the critical care unit at [address]. Mr Henry Kunz says they are unable to control the pt's blood sugar.  Mr Henry Kunz asked the Dr Alejo Bolds call him so he can speak with him directly regarding pt's situation, [phone]

## 2023-03-08 NOTE — PROGRESS NOTES
1900: Bedside shift change report given to ALVARO Camacho (oncoming nurse) by Richa Valdes RN and Edwige Noonan RN (offgoing nurse). Report included the following information SBAR.      2000: Assessment complete. Pt A&O x 2. Pt oriented to person and situation. Pt disoriented to place and time. Pt moves all extremities spontaneously, weakly, and to command. Pt's pupils are large, round, and equal. Pt on 3L NC with coarse lung sounds bilaterally. Pt has decreased command following. Pt is impulsive. Pt sinus tach on the monitor. Pt has palpable radial and pedal pulses bilaterally. Scattered bruising and tattoos noted throughout skin. Pt has excoriation in groin and gluteal cleft. Pt has active bowel sounds and an obese semi-soft abdomen. Pt voiding clear, yellow urine via chew. 0000: Reassessment completed; no changes noted. Pt cleaned of one large, incontinent, watery, brown/ black BM.     0400: Reassessment complete. No changes noted. Pt's K resulted as 3.5 and Mg as 1.3. Orders received for 20 mEq K and 3 mg Mg per Nunu Dominique, NP.    Pt's BG resulted as 499; orders received to restart insulin gtt per Nunu Dominique, NP.    0700: Bedside shift change report given to Guerline Amato RN (oncoming nurse) by Matilde Renteria RN (offgoing nurse). Report included the following information SBAR.

## 2023-03-08 NOTE — PROGRESS NOTES
Physician Progress Note      Sabra Rudd  [facility] #:                  [id number]  :                       1970  ADMIT DATE:       3/5/2023 9:33 PM  [address] DATE:  RESPONDING  PROVIDER #:        Erick Gutierrez MD          QUERY TEXT:    Pt admitted with DKA,sepsis. Pt noted to have ams. If possible, please document in the progress notes and discharge summary if you are evaluating and / or treating any of the following: The medical record reflects the following:  Risk Factors: DKA, sepsis,  polypharmacy  Clinical Indicators: 3/7-confused in triage  3/8 neuro-altered mental status is due to hyperglycemia. patient appears significantly improved in terms of cognition and wakefulness. She is able to answer questions and maintain attention on examiner. She is still mildly confused but is significantly improved compared to yesterday. 3/7-AMS,  secondary to polypharmacy / opioid and benzo withdrawal  Treatment: Neuro cx, slowly reintroduce home meds at low dose  Adonis Salomon RN/CDI   Options provided:  -- Drug-induced encephalopathy due to, Please specify substance. -- Metabolic encephalopathy  -- Septic encephalopathy  -- Toxic encephalopathy  -- Toxic metabolic encephalopathy  -- Delirium due to, Please specify cause.   -- Delirium  -- Other - I will add my own diagnosis  -- Disagree - Not applicable / Not valid  -- Disagree - Clinically unable to determine / Unknown  -- Refer to Clinical Documentation Reviewer    PROVIDER RESPONSE TEXT:    ?Please see progress notes AMS - secondary to polypharmacy / opioid and benzo withdrawal - slowly reintroduce home meds at low dose    Query created by: Felisa Salinas on 3/8/2023 4:31 PM      Electronically signed by:  Erick Gutierrez MD 3/8/2023 4:50 PM

## 2023-03-08 NOTE — PROGRESS NOTES
0730: Report received from [facility] ALVARO Hu.    0800: Pt. Assessed. Pt. Alert and oriented X3. Follows all commands. Pupils equal and reactive.  equal. Breath sounds coarse. Pt. Slight dyspneic and tachypneic at rest. RR- 30's. Pt. On 3 liters NC. Bowel sounds active. Stools currently loose. Temp 99.3 orally this AM. Pulses all palpable. VSS. Pt. SR on the monitor. Pt. Has excoriation to BL groin, Fissure to Gluteal crack. Turn team already on board. Zinc applied. Heels and sacrum intact. Sitter at the bedside. Pt. Has a CVC and chew. Insulin and LR currently infusing. 0815: Pt. Now receiving her bath. Incontinence care provided. Pt. Placed in the recliner with two assist. Bed alarm on and sitter at the bedside. 5404: All PO meds given. PRN Tylenol given for low grade fever. 1000: Pt. Remains in the recliner. VSS. Will complete Q4 BMP when Magnesium and Potassium replacements are complete. 1130: IDR's held with Dr. Esperanza Cuba. Care discussed. 1145: Q4 labs now sent to lab. 1200: Pt. Reassessed. No real changes. Pt. Back in the bed per patient request.     0332: Dr. Esperanza Cuba notified of patient lab results. Awaiting orders. Repeat labs in 2 hours. Received order For Dextrose infusion and electrolyte replacement. 1428: PRN Tylenol given for low grade fever. 1431: 34 units of Lantus now given per Dr. Esperanza Cuba. Can turn off insulin infusion two hours from now. 1600: Pt. Reassessed. No real changes. Pt. Slightly more congested. Pt. Placed in recliner with one assist for dinner. 1627: Insulin infusion now stopped. 1649: D5% infusion now stopped per Dr. Esperanza Cuba. Dr. Esperanza Cuba updated on patient condition. Pt. Sounding more congested. Thick sputum coughed on request. RR remains in the 30's, MD aware. Awaiting orders for Breathing treatment. 1800: Pt. Sitting up eating dinner. Blood sugar taken and SSI insulin given. 1900: [facility].

## 2023-03-08 NOTE — TELEPHONE ENCOUNTER
Santiago Mims notified of message per Dr. Naveen Caraballo and voiced understanding of what was read to them.

## 2023-03-08 NOTE — PROGRESS NOTES
Pharmacy Antimicrobial Kinetic Dosing    Indication for Antimicrobials: Aspiration Pneumonia     Current Regimen of Each Antimicrobial:  Vancomycin - pharmacy dosing; Start Date 3/6; Day # 2  Zosyn 3.375 g IV Q8H; Start Date 3/6; Day # 2    Previous Antimicrobial Therapy:  NA     Goal Level: VANCOMYCIN TROUGH GOAL RANGE    Vancomycin Trough: 15 - 20 mcg/mL    Date Dose & Interval Measured (mcg/mL) Predicted AUC/SANDY   3/7 2000 mg load 8.8 --   3/7 1500 mg load             Date & time of next level: TBD    Dosing calculator used: DEBORAH protocol    Significant Cultures:   NA    Conditions for Dosing Consideration: None    Labs:  Recent Labs     23  0802 23  0352 23  0027 23  0818 23  0502   CREA 1.85* 1.95* 2.20*   < > 2.77*   BUN 33* 41* 44*   < > 48*   PCT  --  3.26  --   --  2.80    < > = values in this interval not displayed. Recent Labs     23  0352 23  0502 23  2238   WBC 15.7* 20.2* 21.0*     Temp (24hrs), Av.2 °F (37.9 °C), Min:98.3 °F (36.8 °C), Max:101.6 °F (38.7 °C)    Creatinine Clearance (mL/min):   CrCl (Adjusted Body Weight): 40.3 mL/min   If actual weight < IBW: CrCl (Actual Body Weight) 54.7    Impression/Plan:   Vancomycin level resulted, 18.7. Will order 1250 mg IV Q24H for an expected   Zosyn dose okay  BMP daily  Antimicrobial stop date TBD     Pharmacy will follow daily and adjust medications as appropriate for renal function and/or serum levels.     Thank you,  Sergei Rowland, PHARMD

## 2023-03-08 NOTE — PROGRESS NOTES
Critical Care Progress Note  Koby Beauchamp MD          Date of Service:  3/8/2023  NAME:  Abdirahman Liu  :  1970  MRN:  [id number]      Subjective/Hospital course:      48 y/o with pmh of DM1, managed on insulin pump and anxiety presents to ED [facility] for cc of AMS. Per chart review pt discontinued her insulin pump earlier in the day. Upon arrival to ED, pt altered and tachypneic. Labs notable for WBC 21, lactic acid of 6.04, biacrb of 6, glucose of 1341, Cr. 2.48, VBG showed a pH of 7.11. Given 3 L IVF, insulin gtt, and broad spectrum antibiotics for possible aspiration PNA. ICU consulted for admission.  - concern for benzo withdrawal.  Started on ativan IV. Mental status improved this a.m.    - gap re opened and started back on insulin ggt overnight. Addendum [phone] - Patient with increasing cough / wheezing. Albuterol listed on home meds. Will try duoneb now and send sputum cx     Assessment/Plan:   DKA  Again will plan to transition off ggt once gap closed. Discussed lantus dosing with DM management and adding increased mealtime coverage  Plan to transition off ggt with lantus and sliding scale   DM mangement following      Sepsis of unknown etiology  Procal elevated  Source unclear  Cultures pending  De escalate abx to CAP coverage      DEBORAH  -Cr 2.48 upon admission baseline unclear  - Cr now improving with good UOP  - replace electrolytes / phos    AMS - Metabolic encephalopathy   - secondary to polypharmacy / opioid and benzo withdrawal / DKA  - slowly reintroduce home meds at low dose   - much improved today      I personally spent 35 minutes of critical care time. This is time spent at this critically ill patient's bedside actively involved in patient care as well as the coordination of care and discussions with the patient's family. This does not include any procedural time which has been billed separately.       Review of Systems:   ROS   No new complaints Vital Signs:   Patient Vitals for the past 4 hrs:   BP Pulse Resp SpO2   03/08/23 1400 (!) 143/87 93 (!) 35 95 %   03/08/23 1300 127/61 91 (!) 35 93 %          Intake/Output Summary (Last 24 hours) at 3/8/2023 1649  Last data filed at 3/8/2023 1634  Gross per 24 hour   Intake 2170.28 ml   Output 6045 ml   Net -3874.72 ml        Physical Examination:    Physical Exam  Awake, oriented to self and place  RRR no murmur  CTA no wheeze   Soft NT ND + bs  Warm well perfused      Labs and Imaging:   Reviewed.       Medications:     Current Facility-Administered Medications   Medication Dose Route Frequency    insulin regular (NOVOLIN R, HUMULIN R) 100 Units in 0.9% sodium chloride 100 mL infusion  0-50 Units/hr IntraVENous TITRATE    glucose chewable tablet 16 g  4 Tablet Oral PRN    glucagon (GLUCAGEN) injection 1 mg  1 mg IntraMUSCular PRN    dextrose 10% infusion 0-250 mL  0-250 mL IntraVENous PRN    potassium phosphate 30 mmol in 0.9% sodium chloride 500 mL infusion   IntraVENous ONCE    dextrose 5% infusion  50 mL/hr IntraVENous CONTINUOUS    cefTRIAXone (ROCEPHIN) 1 g in 0.9% sodium chloride (MBP/ADV) 50 mL MBP  1 g IntraVENous Q24H    insulin glargine (LANTUS) injection 34 Units  34 Units SubCUTAneous DAILY    insulin lispro (HUMALOG) injection   SubCUTAneous AC&HS    insulin lispro (HUMALOG) injection 6 Units  6 Units SubCUTAneous TIDAC    ALPRAZolam (XANAX) tablet 0.5 mg  0.5 mg Oral Q8H PRN    bumetanide (BUMEX) tablet 2 mg  2 mg Oral BID    buPROPion ER (ZYBAN,BUPROBAN) tablet 150 mg  150 mg Oral DAILY    busPIRone (BUSPAR) tablet 10 mg  10 mg Oral BID    famotidine (PEPCID) tablet 20 mg  20 mg Oral DAILY    gabapentin (NEURONTIN) capsule 100 mg  100 mg Oral TID    metoclopramide HCl (REGLAN) tablet 5 mg  5 mg Oral BID    mirtazapine (REMERON) tablet 15 mg  15 mg Oral QHS    morphine ER (MS CONTIN) tablet 30 mg  30 mg Oral BID PRN    rosuvastatin (CRESTOR) tablet 40 mg  40 mg Oral DAILY    enoxaparin (LOVENOX) injection 40 mg  40 mg SubCUTAneous DAILY    dextrose 10% infusion 0-250 mL  0-250 mL IntraVENous PRN    sodium chloride (NS) flush 5-40 mL  5-40 mL IntraVENous Q8H    sodium chloride (NS) flush 5-40 mL  5-40 mL IntraVENous PRN    polyethylene glycol (MIRALAX) packet 17 g  17 g Oral DAILY PRN    alcohol 62% (NOZIN) nasal  1 Ampule  1 Ampule Topical Q12H    doxycycline (VIBRAMYCIN) 100 mg in 0.9% sodium chloride (MBP/ADV) 100 mL MBP  100 mg IntraVENous Q12H    balsam peru-castor oiL (VENELEX) ointment   Topical BID    nystatin (MYCOSTATIN) 100,000 unit/gram powder   Topical Q12H    acetaminophen (TYLENOL) suppository 650 mg  650 mg Rectal Q4H PRN    Or    acetaminophen (TYLENOL) tablet 650 mg  650 mg Oral Q4H PRN    sodium chloride (NS) flush 5-10 mL  5-10 mL IntraVENous PRN     ______________________________________________________________________  EXPECTED LENGTH OF STAY: 3d 21h  ACTUAL LENGTH OF STAY:          2                 Yeimy Hull MD   Pulmonary/CCM  [address] [phone]

## 2023-03-09 ENCOUNTER — APPOINTMENT (OUTPATIENT)
Dept: GENERAL RADIOLOGY | Age: 53
End: 2023-03-09
Attending: INTERNAL MEDICINE
Payer: MEDICARE

## 2023-03-09 LAB
ANION GAP SERPL CALC-SCNC: 9 MMOL/L (ref 5–15)
BUN SERPL-MCNC: 20 MG/DL (ref 6–20)
BUN/CREAT SERPL: 16 (ref 12–20)
CALCIUM SERPL-MCNC: 7.3 MG/DL (ref 8.5–10.1)
CHLORIDE SERPL-SCNC: 100 MMOL/L (ref 97–108)
CO2 SERPL-SCNC: 26 MMOL/L (ref 21–32)
CREAT SERPL-MCNC: 1.28 MG/DL (ref 0.55–1.02)
ERYTHROCYTE [DISTWIDTH] IN BLOOD BY AUTOMATED COUNT: 17.3 % (ref 11.5–14.5)
GLUCOSE BLD STRIP.AUTO-MCNC: 131 MG/DL (ref 65–117)
GLUCOSE BLD STRIP.AUTO-MCNC: 139 MG/DL (ref 65–117)
GLUCOSE BLD STRIP.AUTO-MCNC: 167 MG/DL (ref 65–117)
GLUCOSE BLD STRIP.AUTO-MCNC: 241 MG/DL (ref 65–117)
GLUCOSE BLD STRIP.AUTO-MCNC: 357 MG/DL (ref 65–117)
GLUCOSE BLD STRIP.AUTO-MCNC: 382 MG/DL (ref 65–117)
GLUCOSE SERPL-MCNC: 292 MG/DL (ref 65–100)
HCT VFR BLD AUTO: 29.4 % (ref 35–47)
HGB BLD-MCNC: 9.3 G/DL (ref 11.5–16)
MAGNESIUM SERPL-MCNC: 1.5 MG/DL (ref 1.6–2.4)
MCH RBC QN AUTO: 26.6 PG (ref 26–34)
MCHC RBC AUTO-ENTMCNC: 31.6 G/DL (ref 30–36.5)
MCV RBC AUTO: 84 FL (ref 80–99)
NRBC # BLD: 0 K/UL (ref 0–0.01)
NRBC BLD-RTO: 0 PER 100 WBC
PHOSPHATE SERPL-MCNC: 3.1 MG/DL (ref 2.6–4.7)
PLATELET # BLD AUTO: 172 K/UL (ref 150–400)
PMV BLD AUTO: 10.3 FL (ref 8.9–12.9)
POTASSIUM SERPL-SCNC: 3.3 MMOL/L (ref 3.5–5.1)
RBC # BLD AUTO: 3.5 M/UL (ref 3.8–5.2)
SERVICE CMNT-IMP: ABNORMAL
SODIUM SERPL-SCNC: 135 MMOL/L (ref 136–145)
WBC # BLD AUTO: 7.3 K/UL (ref 3.6–11)

## 2023-03-09 PROCEDURE — 82962 GLUCOSE BLOOD TEST: CPT

## 2023-03-09 PROCEDURE — 74011250636 HC RX REV CODE- 250/636: Performed by: INTERNAL MEDICINE

## 2023-03-09 PROCEDURE — 74011250636 HC RX REV CODE- 250/636: Performed by: HOSPITALIST

## 2023-03-09 PROCEDURE — 84100 ASSAY OF PHOSPHORUS: CPT

## 2023-03-09 PROCEDURE — 97165 OT EVAL LOW COMPLEX 30 MIN: CPT

## 2023-03-09 PROCEDURE — 83735 ASSAY OF MAGNESIUM: CPT

## 2023-03-09 PROCEDURE — 71045 X-RAY EXAM CHEST 1 VIEW: CPT

## 2023-03-09 PROCEDURE — 74011636637 HC RX REV CODE- 636/637: Performed by: HOSPITALIST

## 2023-03-09 PROCEDURE — 65270000046 HC RM TELEMETRY

## 2023-03-09 PROCEDURE — 74011000258 HC RX REV CODE- 258: Performed by: INTERNAL MEDICINE

## 2023-03-09 PROCEDURE — 97116 GAIT TRAINING THERAPY: CPT | Performed by: PHYSICAL THERAPIST

## 2023-03-09 PROCEDURE — 97535 SELF CARE MNGMENT TRAINING: CPT

## 2023-03-09 PROCEDURE — 74011636637 HC RX REV CODE- 636/637: Performed by: INTERNAL MEDICINE

## 2023-03-09 PROCEDURE — 74011250636 HC RX REV CODE- 250/636: Performed by: NURSE PRACTITIONER

## 2023-03-09 PROCEDURE — 36415 COLL VENOUS BLD VENIPUNCTURE: CPT

## 2023-03-09 PROCEDURE — 77010033678 HC OXYGEN DAILY

## 2023-03-09 PROCEDURE — 74011250637 HC RX REV CODE- 250/637: Performed by: HOSPITALIST

## 2023-03-09 PROCEDURE — 74011000258 HC RX REV CODE- 258: Performed by: HOSPITALIST

## 2023-03-09 PROCEDURE — 74011000250 HC RX REV CODE- 250: Performed by: NURSE PRACTITIONER

## 2023-03-09 PROCEDURE — 97162 PT EVAL MOD COMPLEX 30 MIN: CPT | Performed by: PHYSICAL THERAPIST

## 2023-03-09 PROCEDURE — 74011250637 HC RX REV CODE- 250/637: Performed by: INTERNAL MEDICINE

## 2023-03-09 PROCEDURE — 74011636637 HC RX REV CODE- 636/637

## 2023-03-09 PROCEDURE — 80048 BASIC METABOLIC PNL TOTAL CA: CPT

## 2023-03-09 PROCEDURE — 85027 COMPLETE CBC AUTOMATED: CPT

## 2023-03-09 PROCEDURE — 74011250637 HC RX REV CODE- 250/637: Performed by: NURSE PRACTITIONER

## 2023-03-09 RX ORDER — INSULIN LISPRO 100 [IU]/ML
7 INJECTION, SOLUTION INTRAVENOUS; SUBCUTANEOUS ONCE
Status: COMPLETED | OUTPATIENT
Start: 2023-03-09 | End: 2023-03-09

## 2023-03-09 RX ORDER — ONDANSETRON 2 MG/ML
4 INJECTION INTRAMUSCULAR; INTRAVENOUS
Status: DISCONTINUED | OUTPATIENT
Start: 2023-03-09 | End: 2023-03-10 | Stop reason: HOSPADM

## 2023-03-09 RX ORDER — INSULIN GLARGINE 100 [IU]/ML
42 INJECTION, SOLUTION SUBCUTANEOUS DAILY
Status: DISCONTINUED | OUTPATIENT
Start: 2023-03-09 | End: 2023-03-10

## 2023-03-09 RX ORDER — MAGNESIUM SULFATE HEPTAHYDRATE 40 MG/ML
2 INJECTION, SOLUTION INTRAVENOUS ONCE
Status: COMPLETED | OUTPATIENT
Start: 2023-03-09 | End: 2023-03-09

## 2023-03-09 RX ORDER — DOXYCYCLINE HYCLATE 100 MG
100 TABLET ORAL EVERY 12 HOURS
Status: DISCONTINUED | OUTPATIENT
Start: 2023-03-09 | End: 2023-03-09

## 2023-03-09 RX ORDER — LANOLIN ALCOHOL/MO/W.PET/CERES
6 CREAM (GRAM) TOPICAL
Status: DISCONTINUED | OUTPATIENT
Start: 2023-03-09 | End: 2023-03-10 | Stop reason: HOSPADM

## 2023-03-09 RX ADMIN — GABAPENTIN 100 MG: 100 CAPSULE ORAL at 16:31

## 2023-03-09 RX ADMIN — BUMETANIDE 2 MG: 1 TABLET ORAL at 08:39

## 2023-03-09 RX ADMIN — Medication 1 AMPULE: at 21:08

## 2023-03-09 RX ADMIN — ONDANSETRON 4 MG: 2 INJECTION INTRAMUSCULAR; INTRAVENOUS at 21:43

## 2023-03-09 RX ADMIN — SODIUM CHLORIDE, PRESERVATIVE FREE 10 ML: 5 INJECTION INTRAVENOUS at 21:09

## 2023-03-09 RX ADMIN — ENOXAPARIN SODIUM 40 MG: 100 INJECTION SUBCUTANEOUS at 08:39

## 2023-03-09 RX ADMIN — METOCLOPRAMIDE 5 MG: 10 TABLET ORAL at 17:45

## 2023-03-09 RX ADMIN — Medication 1 AMPULE: at 08:40

## 2023-03-09 RX ADMIN — GABAPENTIN 100 MG: 100 CAPSULE ORAL at 21:08

## 2023-03-09 RX ADMIN — DOXYCYCLINE 100 MG: 100 INJECTION, POWDER, LYOPHILIZED, FOR SOLUTION INTRAVENOUS at 08:39

## 2023-03-09 RX ADMIN — Medication 7 UNITS: at 12:21

## 2023-03-09 RX ADMIN — METOCLOPRAMIDE 5 MG: 10 TABLET ORAL at 08:39

## 2023-03-09 RX ADMIN — CASTOR OIL AND BALSAM, PERU: 788; 87 OINTMENT TOPICAL at 21:08

## 2023-03-09 RX ADMIN — MAGNESIUM SULFATE HEPTAHYDRATE 2 G: 40 INJECTION, SOLUTION INTRAVENOUS at 05:25

## 2023-03-09 RX ADMIN — Medication 6 UNITS: at 08:42

## 2023-03-09 RX ADMIN — MELATONIN 6 MG: at 21:08

## 2023-03-09 RX ADMIN — CASTOR OIL AND BALSAM, PERU: 788; 87 OINTMENT TOPICAL at 08:40

## 2023-03-09 RX ADMIN — POTASSIUM BICARBONATE 40 MEQ: 782 TABLET, EFFERVESCENT ORAL at 05:24

## 2023-03-09 RX ADMIN — FAMOTIDINE 20 MG: 20 TABLET, FILM COATED ORAL at 08:39

## 2023-03-09 RX ADMIN — NYSTATIN: 100000 POWDER TOPICAL at 21:08

## 2023-03-09 RX ADMIN — Medication 7 UNITS: at 12:20

## 2023-03-09 RX ADMIN — DOXYCYCLINE HYCLATE 100 MG: 100 TABLET, COATED ORAL at 21:08

## 2023-03-09 RX ADMIN — INSULIN GLARGINE 42 UNITS: 100 INJECTION, SOLUTION SUBCUTANEOUS at 08:50

## 2023-03-09 RX ADMIN — SODIUM CHLORIDE, PRESERVATIVE FREE 10 ML: 5 INJECTION INTRAVENOUS at 13:55

## 2023-03-09 RX ADMIN — DOXYCYCLINE 100 MG: 100 INJECTION, POWDER, LYOPHILIZED, FOR SOLUTION INTRAVENOUS at 21:43

## 2023-03-09 RX ADMIN — BUPROPION HYDROCHLORIDE 150 MG: 150 TABLET, EXTENDED RELEASE ORAL at 08:39

## 2023-03-09 RX ADMIN — NYSTATIN: 100000 POWDER TOPICAL at 08:40

## 2023-03-09 RX ADMIN — MIRTAZAPINE 15 MG: 15 TABLET, FILM COATED ORAL at 21:08

## 2023-03-09 RX ADMIN — Medication 7 UNITS: at 08:41

## 2023-03-09 RX ADMIN — Medication 6 UNITS: at 17:43

## 2023-03-09 RX ADMIN — BUSPIRONE HYDROCHLORIDE 10 MG: 5 TABLET ORAL at 08:40

## 2023-03-09 RX ADMIN — ROSUVASTATIN CALCIUM 40 MG: 40 TABLET, FILM COATED ORAL at 08:39

## 2023-03-09 RX ADMIN — SODIUM CHLORIDE, PRESERVATIVE FREE 10 ML: 5 INJECTION INTRAVENOUS at 05:25

## 2023-03-09 RX ADMIN — Medication 6 UNITS: at 12:20

## 2023-03-09 RX ADMIN — BUSPIRONE HYDROCHLORIDE 10 MG: 5 TABLET ORAL at 17:45

## 2023-03-09 RX ADMIN — Medication 7 UNITS: at 08:42

## 2023-03-09 RX ADMIN — GABAPENTIN 100 MG: 100 CAPSULE ORAL at 08:39

## 2023-03-09 RX ADMIN — CEFTRIAXONE 1 G: 1 INJECTION, POWDER, FOR SOLUTION INTRAMUSCULAR; INTRAVENOUS at 13:55

## 2023-03-09 RX ADMIN — BUMETANIDE 2 MG: 1 TABLET ORAL at 17:46

## 2023-03-09 RX ADMIN — POTASSIUM BICARBONATE 40 MEQ: 782 TABLET, EFFERVESCENT ORAL at 09:45

## 2023-03-09 NOTE — PROGRESS NOTES
1930: Bedside shift change report given to ALVARO Tello (oncoming nurse) by Letitia Rebollar RN (offgoing nurse). Report included the following information SBAR, Kardex, Intake/Output, MAR, Recent Results, and Cardiac Rhythm NSR .     2000: Shift assessment complete, see flowsheet for details. Patient is A&Ox3 and follows commands. Patient is NSR on the monitor. Patient's breath sounds are coarse. 0000: Reassessment complete, see flowsheet for details. No changes to previous assessment. 0400: Reassessment complete, see flowsheet for details. No changes to previous assessment. 0500: Patient potassium came back at 3.3 and magnesium came back at 1.5. Notified NP JACOB Richard and received replacement. 0700: Bedside shift change report given to Letitia Rebollar RN (oncoming nurse) by Komal Harris RN (offgoing nurse). Report included the following information SBAR, Kardex, Intake/Output, MAR, Recent Results, and Cardiac Rhythm NSR .

## 2023-03-09 NOTE — PROGRESS NOTES
1530- Bedside handoff report given to Juju Mcclelland RN (oncoming nurse) by Palak Vera RN (offgoing nurse). 1600- Assessment complete; see flowsheets for further details. Pt is alert and oriented x4, follows commands and moves all extremities spontaneously. Pt appears to have appropriate safety awareness. VS stable, pt denies pain at this time. Left UE endurance catheter and right hand PIV in place. 1605- Assisted pt to bedside commode; pt tolerated ambulation with minimal assistance. 1625- Assisted patient to recliner chair. 1700- Called into patient's room by call bell. Pt found to have pulled right hand PIV out, with IV sitting on the bedside table. Pt first stated that she didn't know the IV was there; then states that she pulled out IV because it was sticking up. Pt  remains oriented to self and place, but appears to be more confused, as compared to prior assessment. Blood sugar rechecked at 1718 with a result of 131. Dr. Ramiro Singh notified via perfect serve. New order placed for Sitter. 1845- Called into patient's room by alarm; pt attempting to walk around the room by herself. Assisted pt back to bed; bed alarm in place. 1900- Change of shift report given to ALVARO Tello (oncoming nurse).

## 2023-03-09 NOTE — PROGRESS NOTES
Problem: Self Care Deficits Care Plan (Adult)  Goal: *Acute Goals and Plan of Care (Insert Text)  Description: FUNCTIONAL STATUS PRIOR TO ADMISSION: Patient was independent and active without use of DME.     HOME SUPPORT: The patient lived with boyfriend but did not require assist.    Occupational Therapy Goals  Initiated 3/9/2023  1. Patient will perform grooming with supervision/set-up within 7 day(s). 2.  Patient will perform bathing with supervision/set-up within 7 day(s). 3.  Patient will perform lower body dressing with supervision/set-up within 7 day(s). 4.  Patient will perform toilet transfers with supervision/set-up within 7 day(s). 5.  Patient will perform all aspects of toileting with independence within 7 day(s). 6.  Patient will participate in upper extremity therapeutic exercise/activities with supervision/set-up for 10 minutes within 7 day(s). 7.  Patient will utilize energy conservation techniques during functional activities with verbal cues within 7 day(s). Outcome: Not Met   OCCUPATIONAL THERAPY EVALUATION  Patient: Pricilla Motta (74 y.o. female)  Date: 3/9/2023  Primary Diagnosis: DKA (diabetic ketoacidosis) ([facility] [address] [E11.10]       Precautions: fall       ASSESSMENT  Based on the objective data described below, the patient presents with decreased balance, strength, functional mobility, ADLs and pt is on 2 to 2.5 liters of Nc at home. Pt reports that she was not using AD, and independent with ADLs and ILS and was driving PTA. She was sitting up in chair when OT arrived, and was able to stand from chair with CGA and was able to clean buttocks with CGA for standing but then she turned and stated she was feeling light headed and had a small LOB. OT cleaned buttocks for Pt and pt then was able to walk with cardiac cart in room to door. Her HR increased to 130 with walking and had 2 LOB with walking, O2% was stable on 2 liters. Pt was left sitting up in chair and all vSS . Current Level of Function Impacting Discharge (ADLs/self-care): max to mod for LB ADLs, decreased balance, strength, ADLs    Functional Outcome Measure: The patient scored  40/100 on the Barthel outcome measure which is indicative of max to mod for ADLs . Other factors to consider for discharge: pt reports that she would be alone at times at home and that maybe her father would be able to come and stay with her then     Patient will benefit from skilled therapy intervention to address the above noted impairments. PLAN :  Recommendations and Planned Interventions: self care training, functional mobility training, therapeutic exercise, balance training, therapeutic activities, endurance activities, patient education, home safety training, and family training/education    Frequency/Duration: Patient will be followed by occupational therapy 4 times a week to address goals. Recommendation for discharge: (in order for the patient to meet his/her long term goals)  To be determined: pending progress home VS rehab, will need 24/7 at home     This discharge recommendation:  Has been made in collaboration with the attending provider and/or case management    IF patient discharges home will need the following DME: tbd       SUBJECTIVE:   Patient stated I think my father, that is a , will be able to come and help me.     OBJECTIVE DATA SUMMARY:   HISTORY:   Past Medical History:   Diagnosis Date    Achilles tendon rupture     along with torn right patellar/femoral ligament    Arthritis     rt. foot    Chronic kidney disease     Chronic pain     abdominal pain    Diabetes (HCC)     IDDM on insulin pump and sensor    DM type 1 (diabetes mellitus, type 1) ([facility]     age 25    Endometriosis     s/p ex-lap 4x    Gastric ulcer     GERD (gastroesophageal reflux disease)     Herpes     Other and unspecified hyperlipidemia     Panic attacks     Psychiatric disorder     anxiety, panic attacks    PTSD (post-traumatic stress disorder)     Unspecified essential hypertension      Past Surgical History:   Procedure Laterality Date    HX COLONOSCOPY      HX GYN      2 d&c's    HX GYN      laparoscopies x5 for endometriosis    HX GYN      mirena in place    HX ORTHOPAEDIC  2002    achiles tendon repair,talus repair    HX ORTHOPAEDIC      injections x2 to right shoulder    HX ORTHOPAEDIC Left 02/07/2019    3rd trigger finger release    [facility] CARDIAC SURG PROCEDURE UNLIST         Expanded or extensive additional review of patient history:     Home Situation  Home Environment: Private residence  # Steps to Enter: 3  Rails to Enter: No  One/Two Story Residence: One story  Living Alone: No  Support Systems: Spouse/Significant Other, Friend/Neighbor  Patient Expects to be Discharged to[de-identified] Home with family assistance  Current DME Used/Available at Home: Other (comment) (insulin pump)  Tub or Shower Type: Tub/Shower combination    Hand dominance: Right    EXAMINATION OF PERFORMANCE DEFICITS:  Cognitive/Behavioral Status:  Neurologic State: Alert  Orientation Level: Oriented to person;Oriented to place;Oriented to time  Cognition: Follows commands  Perception: Appears intact  Perseveration: No perseveration noted  Safety/Judgement: Decreased insight into deficits    Skin: in fair health     Edema: none     Hearing: Auditory  Auditory Impairment: None    Vision/Perceptual:                 Intact                     Range of Motion:    AROM: Within functional limits  PROM: Within functional limits                      Strength:    Strength: Generally decreased, functional                Coordination:  Coordination: Within functional limits  Fine Motor Skills-Upper: Left Intact; Right Intact    Gross Motor Skills-Upper: Left Intact; Right Intact    Tone & Sensation:    Tone: Normal  Sensation: Intact                      Balance:  Sitting: Intact  Standing: Impaired; Without support  Standing - Static: Good;Constant support  Standing - Dynamic : Fair;Constant support (2 LOB with walking with AD)    Functional Mobility and Transfers for ADLs:  Bed Mobility:  Rolling: Other (comment) (pt was sitting up  in chair)    Transfers:  Sit to Stand: Contact guard assistance;Assist x1  Stand to Sit: Contact guard assistance;Assist x1  Bed to Chair: Minimum assistance  Toilet Transfer : Contact guard assistance;Assist x1    ADL Assessment:  Feeding: Independent    Oral Facial Hygiene/Grooming: Setup    Bathing: Moderate assistance;Assist x1         Upper Body Dressing: Contact guard assistance;Stand-by assistance    Lower Body Dressing: Moderate assistance    Toileting: Moderate assistance;Assist x1        Cognitive Retraining  Safety/Judgement: Decreased insight into deficits        Functional Measure:    Barthel Index:  Bathin  Bladder: 0  Bowels: 5  Groomin  Dressin  Feedin  Mobility: 5  Stairs: 0  Toilet Use: 5  Transfer (Bed to Chair and Back): 10  Total: 40/100      The Barthel ADL Index: Guidelines  1. The index should be used as a record of what a patient does, not as a record of what a patient could do. 2. The main aim is to establish degree of independence from any help, physical or verbal, however minor and for whatever reason. 3. The need for supervision renders the patient not independent. 4. A patient's performance should be established using the best available evidence. Asking the patient, friends/relatives and nurses are the usual sources, but direct observation and common sense are also important. However direct testing is not needed. 5. Usually the patient's performance over the preceding 24-48 hours is important, but occasionally longer periods will be relevant. 6. Middle categories imply that the patient supplies over 50 per cent of the effort. 7. Use of aids to be independent is allowed.     Score Interpretation (from [facility])    Independent   60-79 Minimally independent   40-59 Partially dependent   20-39 Very dependent   <20 Totally dependent     -Keyla New, Barthel, D.W. (2268). Functional evaluation: the Barthel Index. [address] ([address] (1997). The Barthel activities of daily living index: self-reporting versus actual performance in the old (> or = 75 years). Journal of [address] [place] [address] 45(7), [facility], FRANTZ, Cris Woodruff., Ileana Fong. (1999). Measuring the change in disability after inpatient rehabilitation; comparison of the responsiveness of the Barthel Index and Functional [place] Measure. Journal of Neurology, Neurosurgery, and Psychiatry, 66(4), 143-810. LOUISA Dobbs, JOSÉ LUIS Soares, & Elias Ribera, M.A. (2004) Assessment of post-stroke quality of life in cost-effectiveness studies: The usefulness of the Barthel Index and the EuroQoL-5D. Quality of Life Research, 15, 472-61         Occupational Therapy Evaluation Charge Determination   History Examination Decision-Making   MEDIUM Complexity : Expanded review of history including physical, cognitive and psychosocial  history  LOW Complexity : 1-3 performance deficits relating to physical, cognitive , or psychosocial skils that result in activity limitations and / or participation restrictions  LOW Complexity : No comorbidities that affect functional and no verbal or physical assistance needed to complete eval tasks       Based on the above components, the patient evaluation is determined to be of the following complexity level: LOW   Pain Rating:  none    Activity Tolerance:   Fair    After treatment patient left in no apparent distress:    Sitting in chair and Call bell within reach    COMMUNICATION/EDUCATION:   The patients plan of care was discussed with: Physical therapist and Registered nurse. Patient/family agree to work toward stated goals and plan of care. This patients plan of care is appropriate for delegation to [facility] [place].     Thank you for this referral.  Kamila Cortes, OT  Time Calculation: 19 mins

## 2023-03-09 NOTE — PROGRESS NOTES
0730: Report received from ALVARO Tello.     0800: Pt. Assessed. Pt. Alert and oriented X3. Follows all commands. Pupils equal and reactive.  equal. Breath sounds coarse. Pt. Slight dyspneic and tachypneic at rest. RR- 30's. Pt. On 3 liters NC. Bowel sounds active. Stools currently loose. Pulses all palpable. VSS. Pt. SR/ST on the monitor. Pt. Has excoriation to BL groin, Fissure to Gluteal crack. Turn team already on board. Zinc applied. Heels and sacrum intact. Pt. Has a CVC and chew. Pt. Currently sitting in the recliner. 1 PIV inserted. Hoping to remove CVC and chew today. Pt. Has stepdown TX orders. 2641: Pt.'s blood glucose 382. Dr. Noman Smith at the bedside. Awaiting orders for 20 units total of patient's SSI, lispro. Lantus dose also increased. 1200: Dr. Noman Smith called. Pt.'s blood glucose is 357. Received TOWR for 20 units of lispro in total now. 1427: Chew removed. Pt. Bathed. Pt. Placed back in the bed per patient request.     1430: ER tech at the bedside to place endurance catheter. 1450: Left upper endurance in place. CVC now removed.

## 2023-03-09 NOTE — DIABETES MGMT
[address] [facility]    CLINICAL NURSE SPECIALIST CONSULT     Initial Presentation   Joanna Rockwell is a 47 y.o. female admitted 3/6/2023 after experiencing AMS and hyperglycemia. The patient reportedly stopped using her insulin pump a couple days ago. LAB: Glucose 1341. Creatine 1.54. GFR 23. Anion gap 36. A1c 0.9% (1/18/2023). CO2 6  CXR:Study Result    Narrative & Impression   EXAM:  XR CHEST PORT     INDICATION: Altered mental status. COMPARISON: 6/30/2022     TECHNIQUE: Portable AP chest view at 2155 hours     FINDINGS: The cardiomediastinal contours are stable. The pulmonary vasculature  is within normal limits. There are increased bilateral perihilar and decreased bibasilar opacities  compared to 6/30/2022. There is no pleural effusion or pneumothorax. The bones  and upper abdomen are stable. IMPRESSION     Increased bilateral perihilar and decreased bibasilar opacities compared to  6/30/2022 may represent atelectasis, edema, infection, or aspiration     CT/MRI:    HX:   Past Medical History:   Diagnosis Date    Achilles tendon rupture     along with torn right patellar/femoral ligament    Arthritis     rt. foot    Chronic kidney disease     Chronic pain     abdominal pain    Diabetes (HCC)     IDDM on insulin pump and sensor    DM type 1 (diabetes mellitus, type 1) ([facility] [address]     age 25    Endometriosis     s/p ex-lap 4x    Gastric ulcer     GERD (gastroesophageal reflux disease)     Herpes     Other and unspecified hyperlipidemia     Panic attacks     Psychiatric disorder     anxiety, panic attacks    PTSD (post-traumatic stress disorder)     Unspecified essential hypertension         INITIAL DX:   DKA (diabetic ketoacidosis) ([facility] [address] [E11.10]     Current Treatment     TX: ABX. Clot prevention.      Consulted by Provider for advanced diabetes nursing assessment and care for:   [x] Transitioning off [employer]   [x] Inpatient management strategy  [x] Home management assessment  [] Survival skill education    Hospital Course   Clinical progress has been complicated by DKA.   5/6/5469 The patient is awake and alert and awake but remains unresponsive to questions or other verbal cues. 3/8/2023 The patient is sitting up in the chair and speaking on the phone. She is alert and oriented x4. Speech is clear. 3/9/2023 The patient is alert and oriented and up in the chair. Pleasant mood. Diabetes History   The patient has a hx of Type 1 diabetes with nephropathy and follows with endocrinologist Kaya Christensen M.D. Current settings are as follows:  - basal: 12a: 1.4  - Carb ratio: 12a: 10, 11a: 10. 5p: 10  - sensitivity: 28  - target: 12a: 130-150  - active insulin time: 3 hours    Diabetes-related Medical History  Acute complications  DKA  Microvascular disease  Nephropathy  Other associated conditions     Anxiety, PTSD    Diabetes Medication History  Key Antihyperglycemic Medications               HumaLOG U-100 Insulin 100 unit/mL injection USE AS DIRECTED FOR INSULIN PUMP.  UNITS PER DAY. DX E10.65--replaces novolog    insulin pump (PATIENT SUPPLIED) misc by SubCUTAneous route as needed. Diabetes self-management practices:   Eating pattern Deferred     Physical activity pattern Deferred    Monitoring pattern   [x] Using CGM     Taking medications pattern  Using insulin pump PTA  Social determinants of health impacting diabetes self-management practices   Struggling with anxiety and/or depression  Overall evaluation:    [x] Not achieving A1c target with drug therapy & self-care practices    Subjective    \" Mini-med is suppose to overnight me a new pump\"     Objective   Physical exam  General Obese female; ill-appearing. Neuro  Not alert   Vital Signs Visit Vitals  /76   Pulse 99   Temp 98.8 °F (37.1 °C)   Resp (!) 35   Ht 5' 4\" (1.626 m)   Wt 94.9 kg (209 lb 3.5 oz)   SpO2 95%   Breastfeeding No   BMI 35.91 kg/m²     s +2.      Laboratory  Recent Labs     03/09/23  0402 03/08/23  1400 03/08/23  1145 03/08/23  0435 03/07/23  0802 03/07/23  0352   * 117* 230* 499*   < > 216*   AGAP 9 5 6 17*   < > 7   WBC 7.3  --   --  9.4  --  15.7*   CREA 1.28* 1.48* 1.59* 1.60*   < > 1.95*    < > = values in this interval not displayed. Factors impacting BG management  Factor Dose Comments   Nutrition:     60 gm/meal   eating   Pain PRN acetaminophen suppository    Infection Doxycycline  Zosyn    Other:   Kidney function  Liver function     GFR 50 Improving  AST 38 ( 3/5/23)      Blood glucose pattern      Significant diabetes-related events over the past 24-72 hours  Admission glucose 1341; Anion gap 36 at admission; started on glucostabilizer    Assessment and Plan   Nursing Diagnosis Risk for unstable blood glucose pattern   Nursing Intervention Domain 525 Decision-making Support   Nursing Interventions Examined current inpatient diabetes/blood glucose control   Explored factors facilitating and impeding inpatient management  Explored corrective strategies with patient and responsible inpatient provider   Informed patient of rational for insulin strategy while hospitalized       Evaluation   This 47year old female patient with Type 1 diabetes did not achieve Bg control prior to admission as evidenced by an A1c of 10.9%. The patient reportedly uses an insulin pump at home but had turned it off for a couple days. The patient was brought to the hospital with AMS and was found to be in DKA. She is on glucostabilizer and is requiring a significant amount of insulin per hour. Her most recent rate change is 25.6 units per hour on glucostabilizer. Her gap remains open. The patient was unresponsive to name and touch. I suspect the patient will need to remain on glucostabilizer today. Diabetes management will continue to follow with the patient, monitoring BG trends and chemistry labs. The patient gap is closed this morning.  She is using <3 units insulin per on glucostabilizer. I will order a transition dose of 18 units Lantus ( 0.2xkg). The patient may require more and once eating consistently she will  likely need scheduled meal time . The patient was using about 1.4 units insulin per hour on her insulin pump according to her most recent endocrinology office visit, therefore it is likely she will require an increase in the basal insulin dose. I will monitir overnight BG trends and make further recommendation/changes in the morning. BG's pilo in the early morning. Anion Gap 17 and patient placed back on glucostabilizer. The patient will need to remain on glucostabilzer unril gap is closed. I recommend that once transitioned off to use TDD from insulin pump ( 1.4 units per hour) Lantus 34 units. Once eating the patient should use 1:10 ratio for meals. The patient reports that she had a good control with her insulin pump prior to admission, but took off her pump 2 days prior to admission. The patient admits that she has Tyoe 1 diabetes and that she requires insulin. She states that she doses not know why she took herinsulin pump off and denies tryi g to harm herself. Update 3/8/2023 at 1330 : Spoke with endocrinologist Shakila Gonzalez M.D. regarding patient. Dr. Reji Hallman sees her outpatient. He agrees with transition dose of 34 units Lantus. No additional basal dose recommended to be given this evening per Dr. Reji Hallman via phone call. Scheduled Humalog ordered at 6 units insulin per consumed  meal of 60 gm carbs. BG's are elevated this morning. I have increased the basal dose to start this morning. The patient is eating and scheduled meal time insulin dosing is in place. I will monitor BG trends making adjustments as needed.      .    Orders/Plan   Use 42 units Lantus daily to start this morning  Humalog 6 units with each meal   Normal sensitivity correction insulin         TONY Gillespie  Diabetes Clinical Nurse Specialist  [facility]  Access via Perfect Serve

## 2023-03-09 NOTE — PROGRESS NOTES
Problem: Mobility Impaired (Adult and Pediatric)  Goal: *Acute Goals and Plan of Care (Insert Text)  Description: FUNCTIONAL STATUS PRIOR TO ADMISSION: Patient was independent and active without use of DME. Reports numerous falls at home. States they are \"in process of moving\" and \"things are discombobulated\" causing her to fall. HOME SUPPORT PRIOR TO ADMISSION: The patient lived with boyfriend but did not require assist.    Physical Therapy Goals  Initiated 3/9/2023  1. Patient will move from supine to sit and sit to supine  in bed with modified independence within 7 day(s). 2.  Patient will transfer from bed to chair and chair to bed with supervision/set-up using the least restrictive device within 7 day(s). 3.  Patient will perform sit to stand with supervision/set-up within 7 day(s). 4.  Patient will ambulate with supervision/set-up for 150 feet with the least restrictive device within 7 day(s). Outcome: Progressing Towards Goal   PHYSICAL THERAPY EVALUATION  Patient: Gwen Healy (25 y.o. female)  Date: 3/9/2023  Primary Diagnosis: DKA [facility]diabetic ketoacidosis[facility] [E11.10]       Precautions:   Fall    ASSESSMENT  Based on the objective data described below, the patient presents with decreased functional mobility from baseline level of function. Patient currently limited by impaired balance, gait instability, mild confusion, and decreased activity tolerance. Currently up in chair and requires CGA to come to stand. Able to stand at rollator for clean up with reports of mild dizziness when \"turning head\". Able to amb approx 20 feet x 2 with rollator and CGA-Gail with 2 LOB with some continued dizziness. HR as high as 130 bpm with activity. Patient continues below baseline and hopeful as mobility improves patient will be safe to DC home with [facility] PT.  May need SNF rehab if does not improve.       Other factors to consider for discharge: at risk for falls, below baseline     Patient will benefit from skilled therapy intervention to address the above noted impairments. PLAN :  Recommendations and Planned Interventions: bed mobility training, transfer training, gait training, therapeutic exercises, neuromuscular re-education, patient and family training/education, and therapeutic activities      Frequency/Duration: Patient will be followed by physical therapy:  5 times a week to address goals. Recommendation for discharge: (in order for the patient to meet his/her long term goals)  Physical therapy at least 2 days/week in the home AND ensure assist and/or supervision for safety with mobility vs SNF rehab        IF patient discharges home will need the following DME: to be determined (TBD)         SUBJECTIVE:   Patient stated I'm so glad to see you. Can I make an appointment at a later time for my knee?     OBJECTIVE DATA SUMMARY:   HISTORY:    Past Medical History:   Diagnosis Date    Achilles tendon rupture     along with torn right patellar/femoral ligament    Arthritis     rt. foot    Chronic kidney disease     Chronic pain     abdominal pain    Diabetes (HCC)     IDDM on insulin pump and sensor    DM type 1 (diabetes mellitus, type 1) ([facility]     age 25    Endometriosis     s/p ex-lap 4x    Gastric ulcer     GERD (gastroesophageal reflux disease)     Herpes     Other and unspecified hyperlipidemia     Panic attacks     Psychiatric disorder     anxiety, panic attacks    PTSD (post-traumatic stress disorder)     Unspecified essential hypertension      Past Surgical History:   Procedure Laterality Date    HX COLONOSCOPY      HX GYN      2 d&c's    HX GYN      laparoscopies x5 for endometriosis    HX GYN      mirena in place    HX ORTHOPAEDIC  2002    achiles tendon repair,talus repair    HX ORTHOPAEDIC      injections x2 to right shoulder    HX ORTHOPAEDIC Left 02/07/2019    3rd trigger finger release    MT CARDIAC SURG PROCEDURE UNLIST         Personal factors and/or comorbidities impacting plan of care:     Home Situation  Home Environment: Private residence  # Steps to Enter: 3  Rails to Enter: No  One/Two Story Residence: One story  Living Alone: No  Support Systems: Spouse/Significant Other, Friend/Neighbor  Patient Expects to be Discharged to[de-identified] Home with family assistance  Current DME Used/Available at Home: Other (comment) (insulin pump)  Tub or Shower Type: Tub/Shower combination    EXAMINATION/PRESENTATION/DECISION MAKING:   Critical Behavior:  Neurologic State: Alert  Orientation Level: Oriented to person, Oriented to place, Oriented to time  Cognition: Follows commands  Safety/Judgement: Decreased insight into deficits  Hearing: Auditory  Auditory Impairment: None    Range Of Motion:  AROM: Within functional limits           PROM: Within functional limits           Strength:    Strength: Generally decreased, functional                    Tone & Sensation:   Tone: Normal              Sensation: Intact               Coordination:  Coordination: Within functional limits  Vision:      Functional Mobility:  Bed Mobility:  Rolling: Other (comment) (pt was sitting up  in chair)           Transfers:  Sit to Stand: Contact guard assistance;Assist x1  Stand to Sit: Contact guard assistance;Assist x1        Bed to Chair: Minimum assistance              Balance:   Sitting: Intact  Standing: Impaired; Without support  Standing - Static: Good;Constant support  Standing - Dynamic : Fair;Constant support (2 LOB with walking with AD)  Ambulation/Gait Training:  Distance (ft): 20 Feet (ft) (x 2)  Assistive Device: Gait belt;Walker, rollator  Ambulation - Level of Assistance: Minimal assistance (2 LOB with Gail to recover)     Gait Description (WDL): Exceptions to WDL  Gait Abnormalities: Decreased step clearance;Shuffling gait; Path deviations        Base of Support: Widened     Speed/Sheba: Slow;Pace decreased (<100 feet/min)  Step Length: Left shortened;Right shortened          Pain Rating:  Reports pain but does not rate    Activity Tolerance:   Fair and requires rest breaks    After treatment patient left in no apparent distress:   Sitting in chair, Call bell within reach, and Caregiver / family present    COMMUNICATION/EDUCATION:   The patients plan of care was discussed with: Physical therapist, Occupational therapist, and Registered nurse. Fall prevention education was provided and the patient/caregiver indicated understanding., Patient/family have participated as able in goal setting and plan of care. , and Patient/family agree to work toward stated goals and plan of care.     Thank you for this referral.  Andres Faith, PT, DPT   Time Calculation: 19 mins

## 2023-03-09 NOTE — PROGRESS NOTES
Critical Care Progress Note  Enedina Maxwell MD          Date of Service:  3/9/2023  NAME:  Nereyda Miles  :  1970  MRN:  [id number]      Subjective/Hospital course:      46 y/o with pmh of DM1, managed on insulin pump and anxiety presents to ED [facility] for cc of AMS. Per chart review pt discontinued her insulin pump earlier in the day. Upon arrival to ED, pt altered and tachypneic. Labs notable for WBC 21, lactic acid of 6.04, biacrb of 6, glucose of 1341, Cr. 2.48, VBG showed a pH of 7.11. Given 3 L IVF, insulin gtt, and broad spectrum antibiotics for possible aspiration PNA. ICU consulted for admission.  - concern for benzo withdrawal.  Started on ativan IV. Mental status improved this a.m.    - gap re opened and started back on insulin ggt overnight. Addendum [phone] - Patient with increasing cough / wheezing. Albuterol listed on home meds. Will try duoneb now and send sputum cx   3/9: Patient reports feeling better, no acute events overnight. Assessment/Plan:     DKA  Now transitioned to sub cut insulin. Gave additional 7U of lispro this morning. DM mangement following      Sepsis of unknown etiology  Procal elevated  Source unclear. Cultures pending. De escalated abx to CAP coverage      DEBORAH  - Cr 2.48 upon admission baseline unclear  - Cr now improving with good UOP  - replace electrolytes / phos    AMS - Metabolic encephalopathy   - secondary to polypharmacy / opioid and benzo withdrawal / DKA  - slowly reintroduce home meds at low dose   - much improved today      I personally spent --- minutes of critical care time. This is time spent at this critically ill patient's bedside actively involved in patient care as well as the coordination of care and discussions with the patient's family. This does not include any procedural time which has been billed separately.       Review of Systems:   ROS   No new complaints     Vital Signs:   Patient Vitals for the past 4 hrs:   BP Pulse Resp SpO2 Weight   03/09/23 0656 -- -- -- -- 94.9 kg (209 lb 3.5 oz)   03/09/23 0600 (!) 144/80 84 (!) 41 96 % --          Intake/Output Summary (Last 24 hours) at 3/9/2023 0901  Last data filed at 3/9/2023 8337  Gross per 24 hour   Intake 1613.56 ml   Output 3275 ml   Net -1661.44 ml          Physical Examination:    Physical Exam  Awake, oriented to self and place  RRR no murmur  CTA no wheeze   Soft NT ND + bs  Warm well perfused      Labs and Imaging:   Reviewed.       Medications:     Current Facility-Administered Medications   Medication Dose Route Frequency    insulin glargine (LANTUS) injection 42 Units  42 Units SubCUTAneous DAILY    cefTRIAXone (ROCEPHIN) 1 g in 0.9% sodium chloride (MBP/ADV) 50 mL MBP  1 g IntraVENous Q24H    insulin lispro (HUMALOG) injection   SubCUTAneous AC&HS    insulin lispro (HUMALOG) injection 6 Units  6 Units SubCUTAneous TIDAC    albuterol-ipratropium (DUO-NEB) 2.5 MG-0.5 MG/3 ML  3 mL Nebulization Q6H PRN    ALPRAZolam (XANAX) tablet 0.5 mg  0.5 mg Oral Q8H PRN    bumetanide (BUMEX) tablet 2 mg  2 mg Oral BID    buPROPion ER (ZYBAN,BUPROBAN) tablet 150 mg  150 mg Oral DAILY    busPIRone (BUSPAR) tablet 10 mg  10 mg Oral BID    famotidine (PEPCID) tablet 20 mg  20 mg Oral DAILY    gabapentin (NEURONTIN) capsule 100 mg  100 mg Oral TID    metoclopramide HCl (REGLAN) tablet 5 mg  5 mg Oral BID    mirtazapine (REMERON) tablet 15 mg  15 mg Oral QHS    morphine ER (MS CONTIN) tablet 30 mg  30 mg Oral BID PRN    rosuvastatin (CRESTOR) tablet 40 mg  40 mg Oral DAILY    enoxaparin (LOVENOX) injection 40 mg  40 mg SubCUTAneous DAILY    dextrose 10% infusion 0-250 mL  0-250 mL IntraVENous PRN    sodium chloride (NS) flush 5-40 mL  5-40 mL IntraVENous Q8H    sodium chloride (NS) flush 5-40 mL  5-40 mL IntraVENous PRN    polyethylene glycol (MIRALAX) packet 17 g  17 g Oral DAILY PRN    alcohol 62% (NOZIN) nasal  1 Ampule  1 Ampule Topical Q12H    doxycycline (VIBRAMYCIN) 100 mg in 0.9% sodium chloride (MBP/ADV) 100 mL MBP  100 mg IntraVENous Q12H    balsam peru-castor oiL (VENELEX) ointment   Topical BID    nystatin (MYCOSTATIN) 100,000 unit/gram powder   Topical Q12H    acetaminophen (TYLENOL) suppository 650 mg  650 mg Rectal Q4H PRN    Or    acetaminophen (TYLENOL) tablet 650 mg  650 mg Oral Q4H PRN    sodium chloride (NS) flush 5-10 mL  5-10 mL IntraVENous PRN     ______________________________________________________________________  EXPECTED LENGTH OF STAY: 3d 21h  ACTUAL LENGTH OF STAY:          [address], MD   Pulmonary/[facility]  [address] [phone]

## 2023-03-10 VITALS
HEIGHT: 64 IN | TEMPERATURE: 99.7 F | RESPIRATION RATE: 22 BRPM | HEART RATE: 129 BPM | OXYGEN SATURATION: 91 % | BODY MASS INDEX: 35.72 KG/M2 | WEIGHT: 209.22 LBS | SYSTOLIC BLOOD PRESSURE: 132 MMHG | DIASTOLIC BLOOD PRESSURE: 67 MMHG

## 2023-03-10 LAB
ANION GAP SERPL CALC-SCNC: 6 MMOL/L (ref 5–15)
BNP SERPL-MCNC: 370 PG/ML
BUN SERPL-MCNC: 19 MG/DL (ref 6–20)
BUN/CREAT SERPL: 14 (ref 12–20)
CALCIUM SERPL-MCNC: 7.9 MG/DL (ref 8.5–10.1)
CHLORIDE SERPL-SCNC: 99 MMOL/L (ref 97–108)
CO2 SERPL-SCNC: 31 MMOL/L (ref 21–32)
CREAT SERPL-MCNC: 1.34 MG/DL (ref 0.55–1.02)
GLUCOSE BLD STRIP.AUTO-MCNC: 242 MG/DL (ref 65–117)
GLUCOSE BLD STRIP.AUTO-MCNC: 303 MG/DL (ref 65–117)
GLUCOSE SERPL-MCNC: 170 MG/DL (ref 65–100)
MAGNESIUM SERPL-MCNC: 1.8 MG/DL (ref 1.6–2.4)
PHOSPHATE SERPL-MCNC: 3.3 MG/DL (ref 2.6–4.7)
POTASSIUM SERPL-SCNC: 3.6 MMOL/L (ref 3.5–5.1)
SERVICE CMNT-IMP: ABNORMAL
SERVICE CMNT-IMP: ABNORMAL
SODIUM SERPL-SCNC: 136 MMOL/L (ref 136–145)

## 2023-03-10 PROCEDURE — 83735 ASSAY OF MAGNESIUM: CPT

## 2023-03-10 PROCEDURE — 97530 THERAPEUTIC ACTIVITIES: CPT

## 2023-03-10 PROCEDURE — 74011250636 HC RX REV CODE- 250/636: Performed by: HOSPITALIST

## 2023-03-10 PROCEDURE — 97535 SELF CARE MNGMENT TRAINING: CPT

## 2023-03-10 PROCEDURE — 36415 COLL VENOUS BLD VENIPUNCTURE: CPT

## 2023-03-10 PROCEDURE — 77010033678 HC OXYGEN DAILY

## 2023-03-10 PROCEDURE — 74011636637 HC RX REV CODE- 636/637: Performed by: INTERNAL MEDICINE

## 2023-03-10 PROCEDURE — 82962 GLUCOSE BLOOD TEST: CPT

## 2023-03-10 PROCEDURE — 74011250637 HC RX REV CODE- 250/637: Performed by: INTERNAL MEDICINE

## 2023-03-10 PROCEDURE — 84100 ASSAY OF PHOSPHORUS: CPT

## 2023-03-10 PROCEDURE — 74011250636 HC RX REV CODE- 250/636: Performed by: INTERNAL MEDICINE

## 2023-03-10 PROCEDURE — 80048 BASIC METABOLIC PNL TOTAL CA: CPT

## 2023-03-10 PROCEDURE — 74011000250 HC RX REV CODE- 250: Performed by: NURSE PRACTITIONER

## 2023-03-10 PROCEDURE — 74011000258 HC RX REV CODE- 258: Performed by: HOSPITALIST

## 2023-03-10 PROCEDURE — 97116 GAIT TRAINING THERAPY: CPT

## 2023-03-10 PROCEDURE — 83880 ASSAY OF NATRIURETIC PEPTIDE: CPT

## 2023-03-10 PROCEDURE — 74011636637 HC RX REV CODE- 636/637

## 2023-03-10 RX ORDER — BUPROPION HYDROCHLORIDE 150 MG/1
150 TABLET, EXTENDED RELEASE ORAL DAILY
COMMUNITY

## 2023-03-10 RX ORDER — INSULIN GLARGINE 100 [IU]/ML
10 INJECTION, SOLUTION SUBCUTANEOUS ONCE
Status: COMPLETED | OUTPATIENT
Start: 2023-03-10 | End: 2023-03-10

## 2023-03-10 RX ORDER — DOXYCYCLINE 100 MG/1
100 TABLET ORAL 2 TIMES DAILY
Qty: 8 TABLET | Refills: 0 | Status: SHIPPED | OUTPATIENT
Start: 2023-03-10 | End: 2023-03-14

## 2023-03-10 RX ORDER — INSULIN GLARGINE 100 [IU]/ML
52 INJECTION, SOLUTION SUBCUTANEOUS
Qty: 15.6 ML | Refills: 0 | Status: SHIPPED | OUTPATIENT
Start: 2023-03-10 | End: 2023-04-09

## 2023-03-10 RX ORDER — INSULIN GLARGINE 100 [IU]/ML
52 INJECTION, SOLUTION SUBCUTANEOUS DAILY
Status: DISCONTINUED | OUTPATIENT
Start: 2023-03-11 | End: 2023-03-10 | Stop reason: HOSPADM

## 2023-03-10 RX ORDER — INSULIN LISPRO 100 [IU]/ML
6 INJECTION, SOLUTION INTRAVENOUS; SUBCUTANEOUS
Qty: 5.4 ML | Refills: 0 | Status: SHIPPED | OUTPATIENT
Start: 2023-03-10 | End: 2023-04-09

## 2023-03-10 RX ORDER — GABAPENTIN 600 MG/1
300 TABLET ORAL 3 TIMES DAILY
Qty: 360 TABLET | Refills: 1 | Status: SHIPPED | OUTPATIENT
Start: 2023-03-10

## 2023-03-10 RX ORDER — INSULIN LISPRO 100 [IU]/ML
6 INJECTION, SOLUTION INTRAVENOUS; SUBCUTANEOUS
Qty: 5.4 ML | Refills: 0 | Status: SHIPPED
Start: 2023-03-10 | End: 2023-03-10 | Stop reason: SDUPTHER

## 2023-03-10 RX ADMIN — Medication 3 UNITS: at 08:25

## 2023-03-10 RX ADMIN — FAMOTIDINE 20 MG: 20 TABLET, FILM COATED ORAL at 08:29

## 2023-03-10 RX ADMIN — NYSTATIN: 100000 POWDER TOPICAL at 10:27

## 2023-03-10 RX ADMIN — ROSUVASTATIN CALCIUM 40 MG: 40 TABLET, FILM COATED ORAL at 10:28

## 2023-03-10 RX ADMIN — SODIUM CHLORIDE, PRESERVATIVE FREE 10 ML: 5 INJECTION INTRAVENOUS at 06:42

## 2023-03-10 RX ADMIN — BUMETANIDE 2 MG: 1 TABLET ORAL at 08:28

## 2023-03-10 RX ADMIN — CASTOR OIL AND BALSAM, PERU: 788; 87 OINTMENT TOPICAL at 10:26

## 2023-03-10 RX ADMIN — INSULIN GLARGINE 42 UNITS: 100 INJECTION, SOLUTION SUBCUTANEOUS at 08:22

## 2023-03-10 RX ADMIN — DOXYCYCLINE 100 MG: 100 INJECTION, POWDER, LYOPHILIZED, FOR SOLUTION INTRAVENOUS at 08:22

## 2023-03-10 RX ADMIN — METOCLOPRAMIDE 5 MG: 10 TABLET ORAL at 08:29

## 2023-03-10 RX ADMIN — GABAPENTIN 100 MG: 100 CAPSULE ORAL at 08:29

## 2023-03-10 RX ADMIN — BUSPIRONE HYDROCHLORIDE 10 MG: 5 TABLET ORAL at 08:28

## 2023-03-10 RX ADMIN — BUPROPION HYDROCHLORIDE 150 MG: 150 TABLET, EXTENDED RELEASE ORAL at 08:29

## 2023-03-10 RX ADMIN — Medication 6 UNITS: at 12:27

## 2023-03-10 RX ADMIN — INSULIN GLARGINE 10 UNITS: 100 INJECTION, SOLUTION SUBCUTANEOUS at 10:26

## 2023-03-10 RX ADMIN — Medication 4 UNITS: at 12:27

## 2023-03-10 RX ADMIN — Medication 1 AMPULE: at 08:32

## 2023-03-10 RX ADMIN — ENOXAPARIN SODIUM 40 MG: 100 INJECTION SUBCUTANEOUS at 08:22

## 2023-03-10 RX ADMIN — GABAPENTIN 100 MG: 100 CAPSULE ORAL at 16:10

## 2023-03-10 RX ADMIN — Medication 6 UNITS: at 08:27

## 2023-03-10 NOTE — PROGRESS NOTES
Transition of Care Plan:Patient is being discharged today     RUR:15%    Disposition:home with family and home health services    Follow up appointments:Placed on AVS     DME needed:none    Transportation at [address] or means to access home:   Friend has keys    [employer] Medicare Letter:Given on 3/10/23     Is patient a [place] and connected with the [place]?     no             Caregiver Contact: Boyfriencallie Cueva Calender [phone]    Discharge Caregiver contacted prior to discharge? Caregiver contacted by patient. Care Conference needed?:    no             Patient is being discharged today. Referral sent to several home health companies. ([facility] , [facility], [facility] and [facility] has denied. [address] referral sent and they have accepted and will follow up with the patient. They are aware patient will be discharging today and they have orders. Patient aware and in agreement. Johana Jenkins RN BSN CRM        [phone]

## 2023-03-10 NOTE — PROGRESS NOTES
Spiritual Care Assessment/Progress Note  [facility]      NAME: Shayna Larry      MRN: [id number]  AGE: 47 y.o.  SEX: female  Jew Affiliation: Adventist   Language: English     3/10/2023     Total Time (in minutes): 29     Spiritual Assessment begun in [facility] 2 CRITICAL CARE 3 through conversation with:         [x]Patient        [] Family    [] Friend(s)        Reason for Consult: Initial/Spiritual assessment, critical care     Spiritual beliefs: (Please include comment if needed)     [x] Identifies with a kristy tradition:         [] Supported by a kristy community:            [] Claims no spiritual orientation:           [] Seeking spiritual identity:                [] Adheres to an individual form of spirituality:           [] Not able to assess:                           Identified resources for coping:      [x] Prayer                               [] Music                  [] Guided Imagery     [x] Family/friends                 [] Pet visits     [] Devotional reading                         [] Unknown     [] Other:                                               Interventions offered during this visit: (See comments for more details)    Patient Interventions: Normalization of emotional/spiritual concerns, Prayer (assurance of), Prayer (actual), Initial/Spiritual assessment, Critical care, Affirmation of kristy           Plan of Care:     [x] Support spiritual and/or cultural needs    [] Support AMD and/or advance care planning process      [] Support grieving process   [] Coordinate Rites and/or Rituals    [] Coordination with community clergy   [] No spiritual needs identified at this time   [] Detailed Plan of Care below (See Comments)  [] Make referral to Music Therapy  [] Make referral to Pet Therapy     [] Make referral to Addiction services  [] Make referral to [facility]  [] Make referral to Spiritual Care Partner  [] No future visits requested        [x] Contact Spiritual Care for further referrals     Comments:  reviewed the patient's chart prior to the visit. Ms. Kristi Abad was sitting in her chair and smiled when the  entered her room. She shared her love for animals and family. She stated she is grateful for the assistance she receives from her boyfriend. Bridgette Denney was in the room and shared her love for dogs as well.  provided a presence, empathetic listening, prayer and encouragement.  services are available 24 hours a day as requested. Rev. JAS Richard.  [address] [facility]   Paging Service [phone])

## 2023-03-10 NOTE — PROGRESS NOTES
Pharmacy Medication Reconciliation     The patient was interviewed regarding current PTA medication list, use and drug allergies;  patient present in room and obtained permission from patient to discuss drug regimen with visitor(s) present. The patient was questioned regarding use of any other inhalers, topical products, over the counter medications, herbal medications, vitamin products or ophthalmic/nasal/otic medication use. Allergy Update: Metolazone, Zocor [simvastatin], Lisinopril, and Medrol [methylprednisolone]    Recommendations/Findings: The following amendments were made to the patient's active medication list on file at [facility]:   1) Additions: none    2) Deletions: cefpodoxime    3) Changes: increase dose of bupropion and buspar    Pertinent Findings: patient stated taking metoclopramide once daily however rx is for twice daily    Clarified PTA med list with patient and Rx Query. PTA medication list was corrected to the following:     Prior to Admission Medications   Prescriptions Last Dose Informant Taking? ALPRAZolam (XANAX) 2 mg tablet  Self Yes   Sig: Take 2 mg by mouth three (3) times daily as needed. HumaLOG U-100 Insulin 100 unit/mL injection  Self Yes   Sig: USE AS DIRECTED FOR INSULIN PUMP.  UNITS PER DAY. DX E10.65--replaces novolog   albuterol (PROVENTIL HFA, VENTOLIN HFA, PROAIR HFA) 90 mcg/actuation inhaler  Self Yes   Sig: Take 2 Puffs by inhalation every six (6) hours as needed for Shortness of Breath. aspirin delayed-release 81 mg tablet  Self Yes   Sig: Take 81 mg by mouth daily. brexpiprazole (REXULTI) 0.5 mg tab tablet  Self Yes   Sig: Take 1 mg by mouth two (2) times a day. buPROPion SR (WELLBUTRIN SR) 150 mg SR tablet  Self Yes   Sig: Take 150 mg by mouth daily. bumetanide (BUMEX) 2 mg tablet  Self Yes   Sig: Take 2 mg by mouth two (2) times daily as needed. busPIRone (BUSPAR) 15 mg tablet  Self Yes   Sig: Take 30 mg by mouth two (2) times a day. butalbital-acetaminophen-caffeine (FIORICET, ESGIC) -40 mg per tablet  Self Yes   Sig: Take 1 Tablet by mouth every six (6) hours as needed for Headache.   carvediloL (COREG) 6.25 mg tablet  Self Yes   Sig: TAKE ONE TABLET BY MOUTH DAILY   citalopram (CELEXA) 40 mg tablet  Self Yes   Sig: Take 40 mg by mouth daily. diclofenac (VOLTAREN) 1 % gel  Self Yes   Sig: Apply 2 g to affected area four (4) times daily as needed for Pain. RT KNEE   famotidine (PEPCID) 40 mg tablet  Self Yes   Sig: Take 40 mg by mouth daily. fluticasone propionate (FLONASE) 50 mcg/actuation nasal spray  Self Yes   Si Sprays by Both Nostrils route daily as needed for Rhinitis.   gabapentin (NEURONTIN) 600 mg tablet  Self Yes   Sig: Take 1 Tablet by mouth four (4) times daily. Max Daily Amount: 2,400 mg. Replaces the 300 mg caps   glucagon (Glucagon Emergency Kit, human,) 1 mg solr  Self Yes   Sig: USE AS DIRECTED IN KIT INSTRUCTIONS   insulin pump (PATIENT SUPPLIED) misc  Self Yes   Sig: by SubCUTAneous route as needed. levonorgestreL (MIRENA) 20 mcg/24 hours (7 yrs) 52 mg IUD  Self Yes   Si Device by IntraUTERine route once. metoclopramide HCl (REGLAN) 5 mg tablet  Self Yes   Sig: Take 1 Tablet by mouth two (2) times a day. Patient taking differently: Take 5 mg by mouth daily. mirtazapine (REMERON) 15 mg tablet  Self Yes   Sig: Take 15 mg by mouth daily. morphine CR (MS CONTIN) 100 mg CR tablet  Self Yes   Sig: Take 1 Tablet by mouth two (2) times a day.   naloxone (Narcan) 4 mg/actuation nasal spray  Self Yes   Sig: Use 1 spray intranasally, then discard. Repeat with new spray every 2 min as needed for opioid overdose symptoms, alternating nostrils. nystatin (MYCOSTATIN) powder  Self Yes   Sig: Apply  to affected area. oxyCODONE IR (ROXICODONE) 5 mg immediate release tablet  Self Yes   Sig: Take 5 mg by mouth every four (4) hours as needed for Pain.    rosuvastatin (CRESTOR) 40 mg tablet  Self Yes   Sig: Take 40 mg by mouth daily. spironolactone (ALDACTONE) 50 mg tablet  Self Yes   Sig: Take 50 mg by mouth daily.       Facility-Administered Medications: None        Thank you,  Asa Ba

## 2023-03-10 NOTE — PROGRESS NOTES
1930: Bedside shift change report given to ALVARO Tello (oncoming nurse) by Faith Guido RN (offgoing nurse). Report included the following information SBAR, Kardex, Intake/Output, MAR, Recent Results, and Cardiac Rhythm NSR .     2000: Shift assessment complete, see flowsheet for details. Patient is A&Ox0 but follows commands. Patient is very confused at this time. Patient is NSR on the monitor. Patient's breath sounds are coarse. 2120: Attempted to get patient to sip water to take night time medications and she could not do it due to being confused. Gave her a bite of applesauce which she was able to swallow so gave her pills crushed in applesauce. After this, patient had an episode of emesis because of the taste of the medicine in the applesauce. Messaged LINDSEY Sommer to notify and received orders for zofran. PO doxycycline was switched to IV at this time. 0000: Reassessment complete, see flowsheet for details. No changes to previous assessment. 0400: Reassessment complete, see flowsheet for details. No changes to previous assessment. 0700: Bedside shift change report given to Gisselle Tran RN (oncoming nurse) by Vito Cleary RN (offgoing nurse). Report included the following information SBAR, Kardex, Intake/Output, MAR, Recent Results, and Cardiac Rhythm NSR .

## 2023-03-10 NOTE — PROGRESS NOTES
9592 - Attempted to schedule ENDOCRINOLOGY hospital follow up appointment. Unable to reach anyone, left voicemail. Pending patient discharge. Nithya Neil Care Management Assistant        [facility] hospital follow-up transitional care appointment has been scheduled with Dr. Cornell Fletcher on 3/14/23 at [phone]. Pending patient discharge.   Magdi Guthrie

## 2023-03-10 NOTE — DISCHARGE SUMMARY
Hospitalist Discharge Summary     Patient ID:  Shayna Larry  [id number]  61 y.o.  1970  3/5/2023    PCP on record: Sharmila Whelan MD    Admit date: 3/5/2023  Discharge date and time: 3/10/2023    DISCHARGE DIAGNOSIS:    DKA  Sepsis of unknown etiology  ? CAP  COPD  Chronic diastolic HF  Acute on chronic respiratory failure , on 3 L/M at home  DEBORAH  AMS - Metabolic encephalopathy   GERD  Anxiety/panic attacks   PTSD     CONSULTATIONS:  IP CONSULT TO NEUROLOGY  IP CONSULT TO NEUROLOGY    Excerpted HPI from H&P of Maryann James MD:  48 y/o with pmh of DM1, managed on insulin pump and anxiety presents to ED [facility] for cc of AMS. Per chart review pt discontinued her insulin pump earlier in the day. Upon arrival to ED, pt altered and tachypneic. Labs notable for WBC 21, lactic acid of 6.04, biacrb of 6, glucose of 1341, Cr. 2.48, VBG showed a pH of 7.11. Given 3 L IVF, insulin gtt, and broad spectrum antibiotics for possible aspiration PNA. ICU consulted for admission. Upon my arrival, pt HDS but tachycardic, SPO2 98% but tachypneic. Answering yes/no questions but altered. Will be admitted to the ICU for further management.    ______________________________________________________________________  DISCHARGE SUMMARY/HOSPITAL COURSE:  for full details see H&P, daily progress notes, labs, consult notes. Patient was admitted to hospital and noted to have diabetic ketoacidosis. She was initially placed on DKA protocol and later transitioned to subcutaneous insulin and diabetic team consultation was placed for assistance with home situation. Patient was on insulin pump at home but has discontinued lately. She will be sent home on Lantus and insulin lispro with meals and will follow-up with endocrinologist and get back on insulin pump.   She was also noted to have sepsis most likely related to community-acquired pneumonia for which she was treated with antibiotics and upon improvement were changed to p.o. antibiotics as below with improvement. She was continued on her home Bumex for diastolic congestive heart failure. She also had slight elevation of creatinine which improved. She also had an acute metabolic encephalopathy most likely related to polypharmacy since she was on multiple encephalopathic medications of medications are being adjusted as below. Patient seen and examined today, vital signs stable, lab work is at baseline. She is much better today. She is alert awake oriented x3. I personally spoke to her boyfriend and notified about hospital course and he verbalized understanding. Patient will be discharged today for outpatient follow-up with PCP in about 1 week's time and also primary endocrinologist in about 1 week's time. _______________________________________________________________________  Patient seen and examined by me on discharge day. Pertinent Findings:  Gen:    Not in distress  Chest: Clear lungs  CVS:   Regular rhythm. No edema  Abd:  Soft, not distended, not tender  Neuro:  Alert, awake  _______________________________________________________________________  DISCHARGE MEDICATIONS:   Current Discharge Medication List        START taking these medications    Details   insulin glargine (LANTUS) 100 unit/mL injection 52 Units by SubCUTAneous route nightly for 30 days. Qty: 15.6 mL, Refills: 0  Start date: 3/10/2023, End date: 4/9/2023      doxycycline (ADOXA) 100 mg tablet Take 1 Tablet by mouth two (2) times a day for 4 days. Qty: 8 Tablet, Refills: 0  Start date: 3/10/2023, End date: 3/14/2023           CONTINUE these medications which have CHANGED    Details   gabapentin (NEURONTIN) 600 mg tablet Take 0.5 Tablets by mouth three (3) times daily. Max Daily Amount: 900 mg.  Replaces the 300 mg caps  Qty: 360 Tablet, Refills: 1  Start date: 3/10/2023    Associated Diagnoses: Type 1 diabetes mellitus with nephropathy (HCC)      insulin lispro (HUMALOG) 100 unit/mL injection 6 Units by SubCUTAneous route Before breakfast, lunch, and dinner for 30 days. Qty: 5.4 mL, Refills: 0  Start date: 3/10/2023, End date: 4/9/2023           CONTINUE these medications which have NOT CHANGED    Details   buPROPion SR (WELLBUTRIN SR) 150 mg SR tablet Take 150 mg by mouth daily. butalbital-acetaminophen-caffeine (FIORICET, ESGIC) -40 mg per tablet Take 1 Tablet by mouth every six (6) hours as needed for Headache. albuterol (PROVENTIL HFA, VENTOLIN HFA, PROAIR HFA) 90 mcg/actuation inhaler Take 2 Puffs by inhalation every six (6) hours as needed for Shortness of Breath. citalopram (CELEXA) 40 mg tablet Take 40 mg by mouth daily. nystatin (MYCOSTATIN) powder Apply  to affected area. famotidine (PEPCID) 40 mg tablet Take 40 mg by mouth daily. metoclopramide HCl (REGLAN) 5 mg tablet Take 1 Tablet by mouth two (2) times a day. Qty: 60 Tablet, Refills: 11      carvediloL (COREG) 6.25 mg tablet TAKE ONE TABLET BY MOUTH DAILY  Qty: 90 Tablet, Refills: 3      glucagon (Glucagon Emergency Kit, human,) 1 mg solr USE AS DIRECTED IN KIT INSTRUCTIONS  Qty: 2 Each, Refills: 11      naloxone (Narcan) 4 mg/actuation nasal spray Use 1 spray intranasally, then discard. Repeat with new spray every 2 min as needed for opioid overdose symptoms, alternating nostrils. Qty: 1 Each, Refills: 0      mirtazapine (REMERON) 15 mg tablet Take 15 mg by mouth daily. aspirin delayed-release 81 mg tablet Take 81 mg by mouth daily. diclofenac (VOLTAREN) 1 % gel Apply 2 g to affected area four (4) times daily as needed for Pain. RT KNEE      oxyCODONE IR (ROXICODONE) 5 mg immediate release tablet Take 5 mg by mouth every four (4) hours as needed for Pain. rosuvastatin (CRESTOR) 40 mg tablet Take 40 mg by mouth daily. brexpiprazole (REXULTI) 0.5 mg tab tablet Take 1 mg by mouth two (2) times a day.       busPIRone (BUSPAR) 15 mg tablet Take 30 mg by mouth two (2) times a day. fluticasone propionate (FLONASE) 50 mcg/actuation nasal spray 2 Sprays by Both Nostrils route daily as needed for Rhinitis. Qty: 16 g, Refills: 3      bumetanide (BUMEX) 2 mg tablet Take 2 mg by mouth two (2) times daily as needed. levonorgestreL (MIRENA) 20 mcg/24 hours (7 yrs) 52 mg IUD 1 Device by IntraUTERine route once. STOP taking these medications       ALPRAZolam (XANAX) 2 mg tablet Comments:   Reason for Stopping:         spironolactone (ALDACTONE) 50 mg tablet Comments:   Reason for Stopping:         morphine CR (MS CONTIN) 100 mg CR tablet Comments:   Reason for Stopping:         insulin pump (PATIENT SUPPLIED) misc Comments:   Reason for Stopping:                 Patient Follow Up Instructions:     1. Recommended diet: Diabetic     2. Recommended activity: Activity as tolerated    3. If you experience any of the following symptoms then please call your primary care physician or return to the emergency room if you cannot get hold of your doctor:    4. Wound Care: Cleanse the buttocks / gluteal cleft gently with soap and water and dry the skin well. Apply the Desitin cream to the skin and allow it to absorb some into the skin. Leave open to air and turn patient off of the buttocks every 2 hours. Float the heels. 5. Lab work: Check blood sugar 4 times a day and take it to PCP. CBC and BMP in 1 week    6. Stop smocking    7. Please note that you are being placed on Lantus and also insulin lispro in place of insulin pump. Once you follow-up with your endocrinologist and are back on insulin pump, please stop Lantus and lispro    8. Bring these papers with you to your follow up appointments. The papers will help your doctors be sure to continue the care plan from the hospital.    9.  Please note your home medication of MS Contin, Xanax are being stopped due to altered mental status. Your home dose of gabapentin is also being reduced due to confusion.   These can be restarted at a later time once your confusion is improved. Follow-up Information       Follow up With Specialties Details Why Contact Vik Tim MD Family Medicine Go on 3/14/2023 at 11:15am for your PCP hospital follow up. Please allow 24-48 hours prior to your appt time/date to reschedule if needed. Silvio Leyva MD Endocrinology Physician Schedule an appointment as soon as possible for a visit in 1 week(s) To schedule your ENDOCRINOLOGY hospital follow up. [address] [place] [address]  Follow up This is your home health company and they will follow you when you are discharged.  Any questions call the above number [phone]          ________________________________________________________________    Risk of deterioration: High    Condition at Discharge:  Stable  __________________________________________________________________    Disposition  Home with family and home health services    ____________________________________________________________________    Code Status: Full Code  ___________________________________________________________________      Total time in minutes spent coordinating this discharge (includes going over instructions, follow-up, prescriptions, and preparing report for sign off to her PCP) :  35 minutes    Signed:  Lambert Yen MD

## 2023-03-10 NOTE — PROGRESS NOTES
0700- Bedside shift change report given to Arabella Albert RN (oncoming nurse) by Linda Pacheco RN (offgoing nurse). Report included the following information SBAR, Kardex, ED Summary, MAR, and Cardiac Rhythm NSR .     0800- Bedside shift assessment complete and patient resting quietly    1050- Diabetes management at the bedside. Discussed use of lantus and follow-up with pt endocrinologist following discharge for insulin pump. 36- Dr. Henry Forman at bedside, new orders noted for discharge. PT/OT will see prior to discharge for home health needs. Will contact pt boyfriend Merlin Noss) and pt's father for discharge education    95 20 92- PT/OT at bedside; pt a stand-by and is impulsive     1315- pt pulled leads off to put sweatshirt on and states boyfriend Merlin Noss) should be here now    1545- endurance catheter removed     1645- pt boyfriend at bedside, both pt and boyfriend present for discharge instruction and education. Pt transported to vehicle via wheelchair.

## 2023-03-10 NOTE — PROGRESS NOTES
Hospitalist Progress Note    NAME: Suad Galvez   :  1970   MRN:  [id number]     Hospital course/summary:  46 y/o with pmh of DM1, managed on insulin pump and anxiety presents to [address] [address] [facility] for cc of AMS. Per chart review pt discontinued her insulin pump earlier in the day. Upon arrival to ED, pt altered and tachypneic. Labs notable for WBC 21, lactic acid of 6.04, biacrb of 6, glucose of 1341, Cr. 2.48, VBG showed a pH of 7.11. Given 3 L IVF, insulin gtt, and broad spectrum antibiotics for possible aspiration PNA. ICU consulted for admission.  - concern for benzo withdrawal.  Started on ativan IV. Mental status improved this a.m.    - gap re opened and started back on insulin ggt overnight.  - transfer out of ICU    Assessment / Plan:  DKA  Now transitioned to sub cut insulin. Gave additional 7U of lispro this morning. DM mangement following, today increase lantus to 42 units and cont premeal insulin 5 units  +ISS      Sepsis of unknown etiology  ? CAP  Procal elevated X2  Source unclear. UA clean   Rapid/influenza neg  CXR with interstitial opacities   Cultures neg so far. De escalated abx to CAP coverage     COPD  Chronic diastolic HF  Acute on chronic respiratory failure , on 3 L/M at home  Resume inhalers  Wean off O2  Cont Bumex  Check BNP     DEBORAH  - Cr 2.48 upon admission baseline unclear  - Cr now improving with good UOP  - replace electrolytes / phos     AMS - Metabolic encephalopathy   - secondary to polypharmacy / opioid and benzo withdrawal / DKA  - slowly reintroduce home meds at low dose   - much improved today     GERD  Anxiety/panic attacks   PTSD  - Resume home meds    30.0 - 39.9 Obese / Body mass index is 35.91 kg/m².   Code status: Full  Prophylaxis: Lovenox  Recommended Disposition: Home w/Family with 247 supervision  Anticipated Discharge Date:  24 hours KARTHIK Barriers: blood glucose stabilization        Subjective:     Discussed with RN events overnight. Feels better  As per RN, pt seems a little bit more confused today  Ambulating with PT  Cesia appetite   Loose BMsX2    Review of Systems:  Symptom Y/N Comments  Symptom Y/N Comments   Fever/Chills    Chest Pain     Poor Appetite    Edema     Cough    Abdominal Pain     Sputum    Joint Pain     SOB/HERNÁNDEZ    Pruritis/Rash     Nausea/vomit    Tolerating PT/OT     Diarrhea    Tolerating Diet     Constipation    Other       PO intake: No data found. Wt Readings from Last 10 Encounters:   03/09/23 94.9 kg (209 lb 3.5 oz)   12/12/22 102.5 kg (226 lb)   09/13/22 105 kg (231 lb 6.4 oz)   06/30/22 106.7 kg (235 lb 3.7 oz)   06/16/22 101.7 kg (224 lb 3.2 oz)   05/14/22 99.8 kg (220 lb)   04/13/22 99.8 kg (220 lb)   03/10/22 99.8 kg (220 lb)   12/09/21 101.5 kg (223 lb 12.8 oz)   09/14/21 97.2 kg (214 lb 3.2 oz)       Objective:     VITALS:   Last 24hrs VS reviewed since prior progress note.  Most recent are:  Patient Vitals for the past 24 hrs:   Temp Pulse Resp BP SpO2   03/10/23 0026 98.5 °F (36.9 °C) 89 15 127/78 --   03/09/23 2000 99.1 °F (37.3 °C) 87 (!) 31 (!) 127/59 94 %   03/09/23 1800 -- (!) 110 29 -- 94 %   03/09/23 1700 -- (!) 101 30 -- 93 %   03/09/23 1600 98.2 °F (36.8 °C) 92 24 135/70 96 %   03/09/23 1500 -- (!) 106 22 -- 94 %   03/09/23 1400 -- (!) 101 (!) 40 -- --   03/09/23 1302 -- (!) 106 (!) 40 112/76 100 %   03/09/23 1300 -- (!) 109 20 112/76 98 %   03/09/23 1200 99 °F (37.2 °C) (!) 108 28 128/71 98 %   03/09/23 1100 -- (!) 108 15 (!) 159/82 --   03/09/23 1000 -- 99 (!) 35 138/76 95 %   03/09/23 0900 -- -- (!) 32 (!) 143/75 93 %   03/09/23 0800 98.8 °F (37.1 °C) 88 30 131/73 95 %   03/09/23 0700 -- 91 22 134/69 92 %   03/09/23 0600 -- 84 (!) 41 (!) 144/80 96 %   03/09/23 0500 -- 82 (!) 31 (!) 140/60 90 %       Intake/Output Summary (Last 24 hours) at 3/10/2023 0432  Last data filed at 3/9/2023 1427  Gross per 24 hour   Intake 250 ml   Output 625 ml   Net -375 ml        I had a face to face encounter, and independently examined this patient as outlined below:    PHYSICAL EXAM:  General:    No distress     HEENT: Atraumatic, anicteric sclerae, pink conjunctivae, MMM  Neck:  Supple, symmetrical  Lungs:   CTA. No Wheezing/Rhonchi. No rales. No tenderness. No Accessory muscle use. CVS:   Regular rhythm. No murmur. No JVD   GI/:   Soft. NT. ND. BS normal  Extremities: +2 edema. No cyanosis. No clubbing. Skin:     Not pale. Not Jaundiced. No rashes   Psych:  Good insight. Not depressed. Not anxious or agitated. Neurologic: Alert and oriented X 4. EOMs intact. No facial asymmetry. No slurred speech. Symmetrical strength, Sensation grossly intact. Labs     I reviewed today's most current labs and imaging studies. Pertinent labs include:  Recent Labs     03/09/23  0402 03/08/23  0435   WBC 7.3 9.4   HGB 9.3* 9.4*   HCT 29.4* 29.9*    175     Recent Labs     03/10/23  0052 03/09/23  0402 03/08/23  1400 03/08/23  1145    135* 144 139   K 3.6 3.3* 3.4* 3.1*   CL 99 100 110* 107   CO2 31 26 29 26   * 292* 117* 230*   BUN 19 20 22* 23*   CREA 1.34* 1.28* 1.48* 1.59*   CA 7.9* 7.3* 8.1* 7.9*   MG 1.8 1.5* 2.1 2.3   PHOS 3.3 3.1  --  1.1*     XR CHEST PORT    Result Date: 3/9/2023  Interval improvement in mild interstitial opacities. No focal consolidation. XR CHEST PORT    Result Date: 3/6/2023  No definite changes. XR CHEST PORT    Result Date: 3/6/2023  No pneumothorax following left IJ catheter placement. Decreased perihilar and bibasilar opacities. XR CHEST PORT    Result Date: 3/5/2023  Increased bilateral perihilar and decreased bibasilar opacities compared to 6/30/2022 may represent atelectasis, edema, infection, or aspiration. No results found.   03/17/20    ECHO ADULT COMPLETE 03/17/2020 3/17/2020    Interpretation Summary  · Agitated saline contrast study was performed. Poor acoustic windows are nondiagnostic. · Normal cavity size, wall thickness and systolic function (ejection fraction normal). Estimated left ventricular ejection fraction is 60 - 65%. Visually measured ejection fraction. No regional wall motion abnormality noted. · Trace mitral valve regurgitation is present. · Moderately elevated central venous pressure (10-15 mmHg); IVC diameter is larger than 21 mm and collapses more than 50% with respiration. Signed by: Jerald Raoz III,  on 3/17/2020  1:11 PM     All Micro Results       Procedure Component Value Units Date/Time    CULTURE, BLOOD, PAIRED [[id number]] Collected: 03/06/23 0045    Order Status: Completed Specimen: Blood Updated: 03/09/23 0555     Special Requests: NO SPECIAL REQUESTS        Culture result: NO GROWTH 3 DAYS       CULTURE, RESPIRATORY/SPUTUM/BRONCH Jorja Rubinstein [[id number]]     Order Status: Sent Specimen: Sputum     S. Lucy Otoniel, UR/CSF [[id number]] Collected: 03/06/23 1137    Order Status: Completed Specimen: Miscellaneous sample Updated: 03/07/23 1536     Source URINE        Specimen Urine     Streptococcus pneumoniae Ag Negative        Fluid culture Not indicated. Organism ID Not indicated. Please note Comment        Comment: (NOTE)  College of American Pathologists standards require a culture to be  performed on CSF specimens submitted for bacterial antigen testing. (CAP [id number]) Urine specimens will not be cultured. Performed At: [facility] & [address] [id number]  Edith Garcia MD EK:[id number]         [facility] [[id number]] Collected: 03/06/23 1137    Order Status: Completed Specimen: Urine Updated: 03/07/23 1536     Source URINE        L pneumophila S1 Ag, urine Negative        Comment: (NOTE)  Presumptive negative for L. pneumophila serogroup 1 antigen in urine,  suggesting no recent or current infection.  Legionnaires' disease  cannot be ruled out since other serogroups and species may also  cause disease. Performed At: [address] [id number]  Adina Alcazar MD XQ:[id number]         HQCET-91 RAPID TEST [[id number]] Collected: 03/05/23 2238    Order Status: Completed Specimen: Nasopharyngeal Updated: 03/06/23 0152     Specimen source Nasopharyngeal        COVID-19 rapid test Not detected        Comment: Rapid Abbott ID Now       Rapid NAAT:  The specimen is NEGATIVE for SARS-CoV-2, the novel coronavirus associated with COVID-19. Negative results should be treated as presumptive and, if inconsistent with clinical signs and symptoms or necessary for patient management, should be tested with an alternative molecular assay. Negative results do not preclude SARS-CoV-2 infection and should not be used as the sole basis for patient management decisions. This test has been authorized by the FDA under an Emergency Use Authorization (EUA) for use by authorized laboratories.    Fact sheet for Healthcare Providers:  http://www.robbin.laurence/  Fact sheet for Patients: http://www.natalie-phil.laurence/       Methodology: Isothermal Nucleic Acid Amplification         CULTURE, BLOOD [[id number]] Collected: 03/05/23 2145    Order Status: Canceled Specimen: Blood     CULTURE, BLOOD [[id number]] Collected: 03/05/23 2145    Order Status: Canceled Specimen: Blood               Current Medications:     Current Facility-Administered Medications:     insulin glargine (LANTUS) injection 42 Units, 42 Units, SubCUTAneous, DAILY, TONY Gan, 42 Units at 03/09/23 0850    melatonin tablet 6 mg, 6 mg, Oral, QHS PRN, Salabao, Teresa, NP, 6 mg at 03/09/23 2108    ondansetron (ZOFRAN) injection 4 mg, 4 mg, IntraVENous, Q4H PRN, SalSloan rojaslyn, NP, 4 mg at 03/09/23 2143    doxycycline (VIBRAMYCIN) 100 mg in 0.9% sodium chloride (MBP/ADV) 100 mL MBP, 100 mg, IntraVENous, Q12H, Sandro Hedrick MD, Last Rate: 100 mL/hr at 03/09/23 2143, 100 mg at 03/09/23 2143    cefTRIAXone (ROCEPHIN) 1 g in 0.9% sodium chloride (MBP/ADV) 50 mL MBP, 1 g, IntraVENous, Q24H, Adriel Smith MD, Last Rate: 100 mL/hr at 03/09/23 1355, 1 g at 03/09/23 1355    insulin lispro (HUMALOG) injection, , SubCUTAneous, AC&HS, Adriel Smith MD, 7 Units at 03/09/23 1221    insulin lispro (HUMALOG) injection 6 Units, 6 Units, SubCUTAneous, TIDAC, Luz Elena Comings, Jaclyn, CNS, 6 Units at 03/09/23 1743    albuterol-ipratropium (DUO-NEB) 2.5 MG-0.5 MG/3 ML, 3 mL, Nebulization, Q6H PRN, Adriel Smith MD    ALPRAZolam Ly Hardy) tablet 0.5 mg, 0.5 mg, Oral, Q8H PRN, Ardiel Smith MD    bumetanide (BUMEX) tablet 2 mg, 2 mg, Oral, BID, Adriel Smith MD, 2 mg at 03/09/23 1746    buPROPion ER (ZYBAN,BUPROBAN) tablet 150 mg, 150 mg, Oral, DAILY, Adriel Smith MD, 150 mg at 03/09/23 0839    busPIRone (BUSPAR) tablet 10 mg, 10 mg, Oral, BID, Adriel Smith MD, 10 mg at 03/09/23 1745    famotidine (PEPCID) tablet 20 mg, 20 mg, Oral, DAILY, Adriel Smith MD, 20 mg at 03/09/23 1006    gabapentin (NEURONTIN) capsule 100 mg, 100 mg, Oral, TID, Adriel Smith MD, 100 mg at 03/09/23 2108    metoclopramide HCl (REGLAN) tablet 5 mg, 5 mg, Oral, BID, Adriel Smith MD, 5 mg at 03/09/23 1745    mirtazapine (REMERON) tablet 15 mg, 15 mg, Oral, QHS, Adriel Smith MD, 15 mg at 03/09/23 2108    morphine ER (MS CONTIN) tablet 30 mg, 30 mg, Oral, BID PRN, Adriel Smith MD, 30 mg at 03/07/23 1225    rosuvastatin (CRESTOR) tablet 40 mg, 40 mg, Oral, DAILY, Adriel Smith MD, 40 mg at 03/09/23 0839    enoxaparin (LOVENOX) injection 40 mg, 40 mg, SubCUTAneous, DAILY, Adriel Smith MD, 40 mg at 03/09/23 0839    dextrose 10% infusion 0-250 mL, 0-250 mL, IntraVENous, PRN, Adriel Smith MD    sodium chloride (NS) flush 5-40 mL, 5-40 mL, IntraVENous, Q8H, Blanca Dial, NP, 10 mL at 03/09/23 2109    sodium chloride (NS) flush 5-40 mL, 5-40 mL, IntraVENous, PRN, Jessica Rodriguez NP    polyethylene glycol (MIRALAX) packet 17 g, 17 g, Oral, DAILY PRN, Jessica Rodriguez NP    alcohol 62% (NOZIN) nasal  1 Ampule, 1 Ampule, Topical, Q12H, Mahendra Espinosa MD, 1 Ampule at 03/09/23 2108    balsam peru-castor oiL (VENELEX) ointment, , Topical, BID, Mahendra Espinosa MD, Given at 03/09/23 2108    nystatin (MYCOSTATIN) 100,000 unit/gram powder, , Topical, Q12H, Mahendra Espniosa MD, Given at 03/09/23 2108    acetaminophen (TYLENOL) tablet 650 mg, 650 mg, Oral, Q4H PRN, 650 mg at 03/08/23 1428 **OR** acetaminophen (TYLENOL) suppository 650 mg, 650 mg, Rectal, Q4H PRN, Todd Covarrubias NP, 650 mg at 03/07/23 0413    sodium chloride (NS) flush 5-10 mL, 5-10 mL, IntraVENous, PRN, Jesus MD Paola     Procedures: see electronic medical records for all procedures/Xrays and details which were not copied into this note but were reviewed prior to creation of Plan. Reviewed most current lab test results and cultures  YES  Reviewed most current radiology test results   YES  Review and summation of old records today    NO  Reviewed patient's current orders and MAR    YES  PMH/ reviewed - no change compared to H&P  ________________________________________________________________________  Care Plan discussed with:    Comments   Patient x    Family      RN x    Care Manager     Consultant                        Multidiciplinary team rounds were held today with , nursing, pharmacist and clinical coordinator. Patient's plan of care was discussed; medications were reviewed and discharge planning was addressed.      ________________________________________________________________________  Total NON critical care TIME:   34  Minutes    Total CRITICAL CARE TIME Spent:   Minutes non procedure based      Comments   >50% of visit spent in counseling and coordination of care x     This includes time during multidisciplinary rounds if indicated above   ________________________________________________________________________  Dorice Parveen, MD

## 2023-03-10 NOTE — PROGRESS NOTES
Problem: Self Care Deficits Care Plan (Adult)  Goal: *Acute Goals and Plan of Care (Insert Text)  Description: FUNCTIONAL STATUS PRIOR TO ADMISSION: Patient was independent and active without use of DME.     HOME SUPPORT: The patient lived with boyfriend but did not require assist.    Occupational Therapy Goals  Initiated 3/9/2023  1. Patient will perform grooming with supervision/set-up within 7 day(s). 2.  Patient will perform bathing with supervision/set-up within 7 day(s). 3.  Patient will perform lower body dressing with supervision/set-up within 7 day(s). 4.  Patient will perform toilet transfers with supervision/set-up within 7 day(s). 5.  Patient will perform all aspects of toileting with independence within 7 day(s). 6.  Patient will participate in upper extremity therapeutic exercise/activities with supervision/set-up for 10 minutes within 7 day(s). 7.  Patient will utilize energy conservation techniques during functional activities with verbal cues within 7 day(s). Outcome: Progressing Towards Goal   OCCUPATIONAL THERAPY TREATMENT  Patient: Flaca Chester (20 y.o. female)  Date: 3/10/2023  Diagnosis: D[facility] k[facility]acid[facility] [E11.10] <principal problem not specified>      Precautions: Fall  Chart, occupational therapy assessment, plan of care, and goals were reviewed. ASSESSMENT  Patient continues with skilled OT services and is progressing towards goals. Pt was sitting up in chair and was able to stand with CGA to SBA from chair. Her balance improved and she did not have a LOB with use of RW.  OT worked with pt on reaching down to take off her sock and she was not able to do so but was able to pull up the strap on her crocs. Pt stated that her boyfriend or father would be able to help her while she is not able to reach her feet.   Pt was moving much better today and feel that she is close to her baseline and feel that she will be able to return home with family and 24/7 assist, and home care OT and PT. Pts HR did increase to 129 when walking and O2% dropped to 84% on RA. Pt stated that she only uses O2 at night but had stated yesterday that she uses 2 liters all day at home. Recommended that pt use RW at home and stay on her home O2 when she is moving and performing her ADLs. Current Level of Function Impacting Discharge (ADLs): max for LB ADLs, setup for UB ADLs sitting in chair or standing at the sink,     Other factors to consider for discharge: will need 24/7 assist         PLAN :  Patient continues to benefit from skilled intervention to address the above impairments. Continue treatment per established plan of care to address goals. Recommend with staff: Anh James for meals and walk to bathroom with RW    Recommend next OT session: work on standing at the sink for UB and jose area bathing    Recommendation for discharge: (in order for the patient to meet his/her long term goals)  Occupational therapy at least 2 days/week in the home AND ensure assist and/or supervision for safety with ADLs    This discharge recommendation:  Has been made in collaboration with the attending provider and/or case management    IF patient discharges home will need the following DME: tbd       SUBJECTIVE:   Patient stated Hay Tucker will have my father help me.     OBJECTIVE DATA SUMMARY:   Cognitive/Behavioral Status:  Neurologic State: Alert  Orientation Level: Oriented to person;Oriented to place;Oriented to time  Cognition: Poor safety awareness  Perception: Appears intact  Perseveration: No perseveration noted  Safety/Judgement: Decreased insight into deficits    Functional Mobility and Transfers for ADLs:      Transfers:  Sit to Stand: Contact guard assistance;Assist x1;Stand-by assistance  Functional Transfers  Toilet Transfer : Contact guard assistance;Assist x1  Bed to Chair: Contact guard assistance;Assist x1    Balance:  Sitting: Intact  Standing: Impaired; Without support  Standing - Static: Good;Constant support  Standing - Dynamic : Constant support;Good    ADL Intervention:  Feeding  Feeding Assistance: Independent    Grooming  Grooming Assistance: Set-up    Upper Body Bathing  Bathing Assistance: Set-up  Position Performed: Seated in chair         Lower Body Bathing  Bathing Assistance: Contact guard assistance;Stand-by assistance  Perineal  : Contact guard assistance;Stand-by assistance  Position Performed: Standing              Toileting  Toileting Assistance: Contact guard assistance;Stand-by assistance  Bladder Hygiene: Contact guard assistance;Stand-by assistance  Bowel Hygiene: Contact guard assistance;Stand-by assistance  Clothing Management: Contact guard assistance    Cognitive Retraining  Safety/Judgement: Decreased insight into deficits          Pain:  none    Activity Tolerance:   Fair    After treatment patient left in no apparent distress:   Sitting in chair, Call bell within reach, and Bed / chair alarm activated    COMMUNICATION/COLLABORATION:   The patients plan of care was discussed with: Physical therapist and Registered nurse.      Jacqueline Beavers OT  Time Calculation: 28 mins

## 2023-03-10 NOTE — PROGRESS NOTES
Received notification from bedside RN about patient with regards to: difficulty with sleep, requesting medication to help  VS: /59, HR 87, RR 31, O2 sat 94% on NC 4 L    Intervention given:   - Melatonin PO q hs PRN

## 2023-03-10 NOTE — PROGRESS NOTES
Problem: Mobility Impaired (Adult and Pediatric)  Goal: *Acute Goals and Plan of Care (Insert Text)  Description: FUNCTIONAL STATUS PRIOR TO ADMISSION: Patient was independent and active without use of DME. Reports numerous falls at home. States they are \"in process of moving\" and \"things are discombobulated\" causing her to fall. HOME SUPPORT PRIOR TO ADMISSION: The patient lived with boyfriend but did not require assist.    Physical Therapy Goals  Initiated 3/9/2023  1. Patient will move from supine to sit and sit to supine  in bed with modified independence within 7 day(s). 2.  Patient will transfer from bed to chair and chair to bed with supervision/set-up using the least restrictive device within 7 day(s). 3.  Patient will perform sit to stand with supervision/set-up within 7 day(s). 4.  Patient will ambulate with supervision/set-up for 150 feet with the least restrictive device within 7 day(s). Outcome: Progressing Towards Goal   PHYSICAL THERAPY TREATMENT  Patient: Flaca Chester (2 y.o. female)  Date: 3/10/2023  Diagnosis: DKA (diabetic ketoacidosis) ([email or web address] [E11.10] <principal problem not specified>      Precautions: Fall  Chart, physical therapy assessment, plan of care and goals were reviewed. ASSESSMENT  Patient continues with skilled PT services and is progressing towards goals. Patient resting in bedside chair and agreeable to PT session. Patient on RA at start of session, SpO2 92% at rest. Patient completed multiple sit<>stand with SBA and cuing for hand placement. Ambulation x60ft with RW SBA with patient demonstrating improved stability this session, no LOB noted. Following ambulation bout patient with increased RR and SOB, SpO2 at 84%. Recovers >90% with rest after about 30 seconds without supplemental O2. Patient has O2 at home but reports she only wears it at night, therapist educating patient on the importance of wearing O2 during activity as she clearly needs it.  Patient verbalizes understanding. Recommend patient continue to utilize RW for safety and [facility] discharge with increased family support. Current Level of Function Impacting Discharge (mobility/balance): SBA    Other factors to consider for discharge: desats without O2         PLAN :  Patient continues to benefit from skilled intervention to address the above impairments. Continue treatment per established plan of care. to address goals. Recommendation for discharge: (in order for the patient to meet his/her long term goals)  Physical therapy at least 2 days/week in the home AND ensure assist and/or supervision for safety with functional mobility    This discharge recommendation:  Has been made in collaboration with the attending provider and/or case management    IF patient discharges home will need the following DME: patient owns DME required for discharge     SUBJECTIVE:   Patient stated I don't wear the oxygen when I go upstairs.     OBJECTIVE DATA SUMMARY:   Critical Behavior:  Neurologic State: Alert  Orientation Level: Oriented to person, Oriented to place, Oriented to time  Cognition: Poor safety awareness  Safety/Judgement: Decreased insight into deficits  Functional Mobility Training:  Transfers:  Sit to Stand: Stand-by assistance  Stand to Sit: Stand-by assistance      Balance:  Sitting: Intact  Standing: Impaired; Without support  Standing - Static: Good;Constant support  Standing - Dynamic : Constant support;Good  Ambulation/Gait Training:  Distance (ft): 60 Feet (ft)  Assistive Device: Walker, rolling  Ambulation - Level of Assistance: Stand-by assistance  Gait Abnormalities: Decreased step clearance  Base of Support: Widened  Speed/Sheba: Pace decreased (<100 feet/min)  Step Length: Right shortened;Left shortened    Pain Rating:  Denies pain    Activity Tolerance:   Fair, desaturates with exertion and requires oxygen, and observed SOB with activity    After treatment patient left in no apparent distress:   Sitting in chair, Call bell within reach, and Caregiver / family present    COMMUNICATION/COLLABORATION:   The patients plan of care was discussed with: Occupational therapist and Registered nurse.      Jennifer Long, PT   Time Calculation: 23 mins

## 2023-03-10 NOTE — DIABETES MGMT
[address] [facility]    CLINICAL NURSE SPECIALIST CONSULT     Initial Presentation   Giovani Shannon is a 47 y.o. female admitted 3/6/2023 after experiencing AMS and hyperglycemia. The patient reportedly stopped using her insulin pump a couple days ago. LAB: Glucose 1341. Creatine 1.54. GFR 23. Anion gap 36. A1c 0.9% (1/18/2023). CO2 6  CXR:Study Result    Narrative & Impression   EXAM:  XR CHEST PORT     INDICATION: Altered mental status. COMPARISON: 6/30/2022     TECHNIQUE: Portable AP chest view at 2155 hours     FINDINGS: The cardiomediastinal contours are stable. The pulmonary vasculature  is within normal limits. There are increased bilateral perihilar and decreased bibasilar opacities  compared to 6/30/2022. There is no pleural effusion or pneumothorax. The bones  and upper abdomen are stable. IMPRESSION     Increased bilateral perihilar and decreased bibasilar opacities compared to  6/30/2022 may represent atelectasis, edema, infection, or aspiration     CT/MRI:    HX:   Past Medical History:   Diagnosis Date    Achilles tendon rupture     along with torn right patellar/femoral ligament    Arthritis     rt. foot    Chronic kidney disease     Chronic pain     abdominal pain    Diabetes (HCC)     IDDM on insulin pump and sensor    DM type 1 (diabetes mellitus, type 1) ([facility]     age 25    Endometriosis     s/p ex-lap 4x    Gastric ulcer     GERD (gastroesophageal reflux disease)     Herpes     Other and unspecified hyperlipidemia     Panic attacks     Psychiatric disorder     anxiety, panic attacks    PTSD (post-traumatic stress disorder)     Unspecified essential hypertension         INITIAL DX:   DKA (diabetic ketoacidosis) ([facility] [E11.10]     Current Treatment     TX: ABX. Clot prevention.      Consulted by Provider for advanced diabetes nursing assessment and care for:   [x] Transitioning off Angie Prom   [x] Inpatient management strategy  [x] Home management assessment  [] Survival skill education    Hospital Course   Clinical progress has been complicated by DKA.   4/2/6364 The patient is awake and alert and awake but remains unresponsive to questions or other verbal cues. 3/8/2023 The patient is sitting up in the chair and speaking on the phone. She is alert and oriented x4. Speech is clear. 3/9/2023 The patient is alert and oriented and up in the chair. Pleasant mood. Diabetes History   The patient has a hx of Type 1 diabetes with nephropathy and follows with endocrinologist Shakila Gonzalez M.D. Current settings are as follows:  - basal: 12a: 1.4  - Carb ratio: 12a: 10, 11a: 10. 5p: 10  - sensitivity: 28  - target: 12a: 130-150  - active insulin time: 3 hours    Diabetes-related Medical History  Acute complications  DKA  Microvascular disease  Nephropathy  Other associated conditions     Anxiety, PTSD    Diabetes Medication History  Key Antihyperglycemic Medications               insulin glargine (LANTUS) 100 unit/mL injection (Taking) 52 Units by SubCUTAneous route nightly for 30 days. insulin lispro (HUMALOG) 100 unit/mL injection (Taking) 6 Units by SubCUTAneous route Before breakfast, lunch, and dinner for 30 days. HumaLOG U-100 Insulin 100 unit/mL injection (Taking) USE AS DIRECTED FOR INSULIN PUMP.  UNITS PER DAY. DX E10.65--replaces novolog    insulin pump (PATIENT SUPPLIED) misc (Taking) by SubCUTAneous route as needed.              Diabetes self-management practices:   Eating pattern Deferred     Physical activity pattern Deferred    Monitoring pattern   [x] Using CGM     Taking medications pattern  Using insulin pump PTA  Social determinants of health impacting diabetes self-management practices   Struggling with anxiety and/or depression  Overall evaluation:    [x] Not achieving A1c target with drug therapy & self-care practices    Subjective    \" I'm doing better\"     Objective   Physical exam  General Obese female; ill-appearing. Neuro  Not alert   Vital Signs Visit Vitals  /67 (BP 1 Location: Right upper arm, BP Patient Position: At rest)   Pulse (!) 107   Temp 99.7 °F (37.6 °C)   Resp 9   Ht 5' 4\" (1.626 m)   Wt 94.9 kg (209 lb 3.5 oz)   SpO2 91%   Breastfeeding No   BMI 35.91 kg/m²     s +2. Laboratory  Recent Labs     03/10/23  0052 03/09/23  0402 03/08/23  1400 03/08/23  1145 03/08/23  0435   * 292* 117*   < > 499*   AGAP 6 9 5   < > 17*   WBC  --  7.3  --   --  9.4   CREA 1.34* 1.28* 1.48*   < > 1.60*    < > = values in this interval not displayed. Factors impacting BG management  Factor Dose Comments   Nutrition:     60 gm/meal   eating   Pain PRN acetaminophen suppository    Infection Doxycycline  Rocephin    Other:   Kidney function  Liver function     GFR 48  AST 38 ( 3/5/23)      Blood glucose pattern      Significant diabetes-related events over the past 24-72 hours  Admission glucose 1341; Anion gap 36 at admission; started on glucostabilizer    Assessment and Plan   Nursing Diagnosis Risk for unstable blood glucose pattern   Nursing Intervention Domain 5251 Decision-making Support   Nursing Interventions Examined current inpatient diabetes/blood glucose control   Explored factors facilitating and impeding inpatient management  Explored corrective strategies with patient and responsible inpatient provider   Informed patient of rational for insulin strategy while hospitalized       Evaluation   This 47year old female patient with Type 1 diabetes did not achieve Bg control prior to admission as evidenced by an A1c of 10.9%. The patient reportedly uses an insulin pump at home but had turned it off for a couple days. The patient was brought to the hospital with AMS and was found to be in DKA. She is on glucostabilizer and is requiring a significant amount of insulin per hour. Her most recent rate change is 25.6 units per hour on glucostabilizer. Her gap remains open.  The patient was unresponsive to name and touch. I suspect the patient will need to remain on glucostabilizer today. Diabetes management will continue to follow with the patient, monitoring BG trends and chemistry labs. The patient gap is closed this morning. She is using <3 units insulin per on glucostabilizer. I will order a transition dose of 18 units Lantus ( 0.2xkg). The patient may require more and once eating consistently she will  likely need scheduled meal time . The patient was using about 1.4 units insulin per hour on her insulin pump according to her most recent endocrinology office visit, therefore it is likely she will require an increase in the basal insulin dose. I will monitir overnight BG trends and make further recommendation/changes in the morning. BG's pilo in the early morning. Anion Gap 17 and patient placed back on glucostabilizer. The patient will need to remain on glucostabilzer unril gap is closed. I recommend that once transitioned off to use TDD from insulin pump ( 1.4 units per hour) Lantus 34 units. Once eating the patient should use 1:10 ratio for meals. The patient reports that she had a good control with her insulin pump prior to admission, but took off her pump 2 days prior to admission. The patient admits that she has Tyoe 1 diabetes and that she requires insulin. She states that she doses not know why she took herinsulin pump off and denies tryi g to harm herself. Update 3/8/2023 at 1330 : Spoke with endocrinologist Christine Reyna M.D. regarding patient. Dr. Cayla Cm sees her outpatient. He agrees with transition dose of 34 units Lantus. No additional basal dose recommended to be given this evening per Dr. Cayla Cm via phone call. Scheduled Humalog ordered at 6 units insulin per consumed  meal of 60 gm carbs. BG's are elevated this morning. I have increased the basal dose to start this morning. The patient is eating and scheduled meal time insulin dosing is in place.  I will monitor BG trends making adjustments as needed. Bg's were better controlled yesterday afternoon and evening. However the patient used a significant amount of correction insulin. I have increased the basal insulin dosing today. In anticipation of discharge use 52 units Lantus and 6 units Humalog with meals. The patient should work with her endocrinologist to resume her insulin pump, since will likely need adjustments. This has been discussed with the patient. She is amenable.        .    Orders/Plan   Use 52 units Lantus daily to start this morning  Humalog 6 units with each meal   Normal sensitivity correction insulin         TONY Harmon  Diabetes Clinical Nurse Specialist  [facility]  Access via [address] [place] [address] yes

## 2023-03-12 LAB
BACTERIA SPEC CULT: NORMAL
SERVICE CMNT-IMP: NORMAL

## 2023-03-12 NOTE — PROGRESS NOTES
Received page at 8:27 that pt \"need(s) a new site\"  Returned page, went to . Left message asking her to page back. She paged again at 8:38, I again returned page. Again went to voicemail. Returned call to pt 3rd x, she paged to ask if she could come in to the office Monday morning so we could help her with her pump start. She reported that Dr. Mendez Bridges changed her pump site every 3 days in person. Shiela Liriano states that she has been taking using lantus/humalog pens since being off her pump following discharge. She just needs help with her infusion set insertion, has all the supplies. Called primary Endo who confirmed this was not the case. Returned call to Jane Miles was with her. This time, when I specifically clarified, Shay Thorpe you asking Dr. Mendez Bridges to personally place the pump or to change the settings\" she responded that she was looking for the latter. She confirmed that her BG is currently normal and she is not confused.      [facility] Tuesday [address] [facility] [place] [address] Diabetes & Endocrinology

## 2023-03-13 ENCOUNTER — PATIENT OUTREACH (OUTPATIENT)
Dept: CASE MANAGEMENT | Age: 53
End: 2023-03-13

## 2023-03-13 ENCOUNTER — TELEPHONE (OUTPATIENT)
Dept: ENDOCRINOLOGY | Age: 53
End: 2023-03-13

## 2023-03-13 NOTE — PROGRESS NOTES
Care Transitions Initial Call- Resolving episode, currently admitted to [facility].     Call within 2 business days of discharge: Yes     Patient Current Location: [facility] [address] [facility]. Patient: Tom Galvez Patient : 1970 MRN: [id number]    Last Discharge [address]       Date Complaint Diagnosis Description Type Department Provider    3/5/23 Diabetes; Altered mental status Type 1 diabetes mellitus with ketoacidotic coma ([facility] . .. ED to Hosp-Admission (Discharged) (ADMIT) Loyda Haywood MD; Hoang Beatty, ... Was this an external facility discharge? No     Challenges to be reviewed by the provider   Additional needs identified to be addressed with provider: yes    CTN reached trixie Weir- he states that he called EMS this morning, they took her [facility]. He is there now with her, but not in her room. He said EMS said her BG was 46- [facility] ED said it was above 800. Boyfriend reports doing well over weekend, last night he felt she was breathing heavy- he checked her O2 was on 2L- he increased to 3L- she felt better. Documentation states she wears nocturnal O2 at 2L. Method of communication with provider : phone call to PCP office, chart routing to Endocrine. Discussed COVID-19 related testing which was available at this time. Test results were negative. Patient informed of results, if available? SKYLER     Advance Care Planning:   Does patient have an Advance Directive: yes, reviewed and current, decision makers updated. Inpatient Readmission Risk score: Unplanned Readmit Risk Score: 15.7    Was this a readmission? yes     Care Transition Nurse (CTN) contacted the  patient  by telephone to perform post hospital discharge assessment. Verified name and  with  boyfriend  as identifiers. Provided introduction to self, and explanation of the CTN role.      Home Health/Outpatient orders at discharge: PT, OT, and Susan [facility]: [facility]  Date of initial visit: CTN spoke with office, they had not seen. CTN asked if they had father's and boyfriend's contact information to reach out. Explained she was current at [facility] hospital-admitted. Durable Medical Equipment ordered at discharge: uses O2- nocturnal- therapy recommended continuous use with any activity- 2 L/min. Was patient discharged with a pulse oximeter? SKYLER    Is follow up appointment scheduled within 7 days of discharge? yes. [address] follow up appointment(s): No future appointments. CTN cancelled tomorrow's appointment with Endocrinology. Chart routing to provider. Non-[facility] follow up appointment(s):   PCP- Dr. Andrea Lorenzo- 3/14 at 11:15- CTN contacted office, they have her seeing PCP today- 3:45 and tomorrow- they will cancel both, advised she admitted to [facility]. CTN will monitor for admission to [facility]. Closing episode, currently admitted to [facility] provided contact information for future needs.      Goals Addressed    None

## 2023-03-13 NOTE — TELEPHONE ENCOUNTER
Adri Leak LVM at 11:12 AM stating Ms Álvaro Bynum has an appt tomorrow but she has been admitted to [facility] as that was the only place the rescue squad could transport. Fco Lee stated last night/early AM pt went unconscious per pt's boyfriend Anibal Earing. Pt is currently at [facility] in the critical care unit. Fco Lee stated he was told pt's blood glucose the morning was 367 and her pump may not have been working correctly. Fco Lee states he doesn't think she'll make appt tomorrow but he will keep Dr Milagros Restrepo updated as he finds out more.

## 2023-03-14 NOTE — TELEPHONE ENCOUNTER
Called and left a voicemail with Katie Muhammad letting him know I received his message and am aware that Bolivar Madeline is at [facility]. I asked that he or she please call me when she gets out so I can reschedule her appointment.

## 2023-03-21 ENCOUNTER — TELEPHONE (OUTPATIENT)
Dept: ENDOCRINOLOGY | Age: 53
End: 2023-03-21

## 2023-03-21 RX ORDER — INSULIN GLARGINE 100 [IU]/ML
58 INJECTION, SOLUTION SUBCUTANEOUS
Qty: 15 ML | Refills: 0 | Status: SHIPPED | OUTPATIENT
Start: 2023-03-21 | End: 2023-04-20

## 2023-03-21 RX ORDER — INSULIN LISPRO 100 [IU]/ML
11 INJECTION, SOLUTION INTRAVENOUS; SUBCUTANEOUS
Qty: 15 ML | Refills: 0
Start: 2023-03-21 | End: 2023-04-20

## 2023-03-27 ENCOUNTER — TELEPHONE (OUTPATIENT)
Dept: ENDOCRINOLOGY | Age: 53
End: 2023-03-27

## 2023-03-27 NOTE — TELEPHONE ENCOUNTER
Patient called 3/27 @ 2:15 PM    Pt stated she just got out of the hospital and was looking to rescheduled her last cancelled appt. There is nothing open till may. Where would you like her scheduled? Thank you!

## 2023-03-28 ENCOUNTER — OFFICE VISIT (OUTPATIENT)
Dept: ENDOCRINOLOGY | Age: 53
End: 2023-03-28
Payer: MEDICARE

## 2023-03-28 VITALS
HEART RATE: 106 BPM | DIASTOLIC BLOOD PRESSURE: 64 MMHG | WEIGHT: 211.4 LBS | HEIGHT: 64 IN | SYSTOLIC BLOOD PRESSURE: 121 MMHG | BODY MASS INDEX: 36.09 KG/M2

## 2023-03-28 DIAGNOSIS — E10.21 TYPE 1 DIABETES MELLITUS WITH NEPHROPATHY (HCC): Primary | ICD-10-CM

## 2023-03-28 DIAGNOSIS — R94.6 BORDERLINE ABNORMAL THYROID FUNCTION TEST: ICD-10-CM

## 2023-03-28 DIAGNOSIS — I10 ESSENTIAL HYPERTENSION: ICD-10-CM

## 2023-03-28 DIAGNOSIS — E78.5 HYPERLIPIDEMIA LDL GOAL <100: ICD-10-CM

## 2023-03-28 DIAGNOSIS — R79.89 ELEVATED LFTS: ICD-10-CM

## 2023-03-28 PROCEDURE — 3017F COLORECTAL CA SCREEN DOC REV: CPT | Performed by: INTERNAL MEDICINE

## 2023-03-28 PROCEDURE — G8427 DOCREV CUR MEDS BY ELIG CLIN: HCPCS | Performed by: INTERNAL MEDICINE

## 2023-03-28 PROCEDURE — 99214 OFFICE O/P EST MOD 30 MIN: CPT | Performed by: INTERNAL MEDICINE

## 2023-03-28 PROCEDURE — 3078F DIAST BP <80 MM HG: CPT | Performed by: INTERNAL MEDICINE

## 2023-03-28 PROCEDURE — 3074F SYST BP LT 130 MM HG: CPT | Performed by: INTERNAL MEDICINE

## 2023-03-28 PROCEDURE — 2022F DILAT RTA XM EVC RTNOPTHY: CPT | Performed by: INTERNAL MEDICINE

## 2023-03-28 PROCEDURE — 3046F HEMOGLOBIN A1C LEVEL >9.0%: CPT | Performed by: INTERNAL MEDICINE

## 2023-03-28 PROCEDURE — 1111F DSCHRG MED/CURRENT MED MERGE: CPT | Performed by: INTERNAL MEDICINE

## 2023-03-28 PROCEDURE — G8417 CALC BMI ABV UP PARAM F/U: HCPCS | Performed by: INTERNAL MEDICINE

## 2023-03-28 PROCEDURE — G9717 DOC PT DX DEP/BP F/U NT REQ: HCPCS | Performed by: INTERNAL MEDICINE

## 2023-03-28 RX ORDER — INSULIN LISPRO 100 [IU]/ML
INJECTION, SOLUTION INTRAVENOUS; SUBCUTANEOUS
Qty: 20 ML | Refills: 11 | Status: SHIPPED | OUTPATIENT
Start: 2023-03-28

## 2023-03-28 RX ORDER — INSULIN GLARGINE 100 [IU]/ML
47 INJECTION, SOLUTION SUBCUTANEOUS DAILY
Qty: 15 ML | Refills: 0
Start: 2023-03-28 | End: 2023-03-28

## 2023-03-28 RX ORDER — INSULIN LISPRO 100 [IU]/ML
10 INJECTION, SOLUTION INTRAVENOUS; SUBCUTANEOUS
Qty: 15 ML | Refills: 0
Start: 2023-03-28 | End: 2023-03-28

## 2023-03-28 RX ORDER — INSULIN LISPRO 100 [IU]/ML
INJECTION, SOLUTION INTRAVENOUS; SUBCUTANEOUS
Qty: 15 ML | Refills: 0
Start: 2023-03-28 | End: 2023-03-28

## 2023-03-28 RX ORDER — INSULIN GLARGINE 100 [IU]/ML
47 INJECTION, SOLUTION SUBCUTANEOUS DAILY
Qty: 20 ML | Refills: 11 | Status: SHIPPED | OUTPATIENT
Start: 2023-03-28

## 2023-03-28 RX ORDER — NAPROXEN SODIUM 220 MG
TABLET ORAL
Qty: 100 EACH | Refills: 11 | Status: SHIPPED | OUTPATIENT
Start: 2023-03-28

## 2023-03-28 NOTE — PROGRESS NOTES
Chief Complaint   Patient presents with    Diabetes     PCP and pharmacy confirmed      History of Present Illness: Powell Alpers is a 47 y.o. female here for follow up of diabetes. Weight down 15 lbs since last visit in 12/22. She is here with Geovanni Tubbs today. After that visit something happened with her pump (she states it wasn't working) and she ended up in DKA at [facility] on 3/5/23 after not getting any insulin for 2 days and was treated and switched to lantus and Humalog. She was supposed to come see me on 3/14/23 but she was readmitted to [facility] on 3/13-3/17/23 after another episode of DKA and was treated and discharged. Her coreg and scotty were stopped during her admission. She is currently taking lantus 47 units in the morning and humalog 10 units before meals plus 1:50 > 150 for correction. She states her fasting sugar was 102 this morning. Her mental status has waxed and waned per Art Avila and since her pump is not currently working, Oren's asked to stay off the pump and just use injections and I'm fine with this for now. I gave her a new plan below of how to adjust her humalog and will have her return in a month to see how this is working. Current Outpatient Medications   Medication Sig    insulin glargine (LANTUS) 100 unit/mL injection 47 Units by SubCUTAneous route daily for 30 days. Dose change 03/28/23--updated med list--did not send prescription to the pharmacy    insulin lispro (HUMALOG) 100 unit/mL injection Inject 10 units before meals + 1 units for every 50 mg/dl above 150 mg/dl--Dose change 03/28/23--updated med list--did not send prescription to the pharmacy    buPROPion SR (WELLBUTRIN SR) 150 mg SR tablet Take 150 mg by mouth daily. gabapentin (NEURONTIN) 600 mg tablet Take 0.5 Tablets by mouth three (3) times daily. Max Daily Amount: 900 mg.  Replaces the 300 mg caps    butalbital-acetaminophen-caffeine (FIORICET, ESGIC) -40 mg per tablet Take 1 Tablet by mouth every six (6) hours as needed for Headache. albuterol (PROVENTIL HFA, VENTOLIN HFA, PROAIR HFA) 90 mcg/actuation inhaler Take 2 Puffs by inhalation every six (6) hours as needed for Shortness of Breath. citalopram (CELEXA) 40 mg tablet Take 40 mg by mouth daily. nystatin (MYCOSTATIN) powder Apply  to affected area. famotidine (PEPCID) 40 mg tablet Take 40 mg by mouth daily. metoclopramide HCl (REGLAN) 5 mg tablet Take 1 Tablet by mouth two (2) times a day. glucagon (Glucagon Emergency Kit, human,) 1 mg solr USE AS DIRECTED IN KIT INSTRUCTIONS    naloxone (Narcan) 4 mg/actuation nasal spray Use 1 spray intranasally, then discard. Repeat with new spray every 2 min as needed for opioid overdose symptoms, alternating nostrils. mirtazapine (REMERON) 15 mg tablet Take 15 mg by mouth daily. aspirin delayed-release 81 mg tablet Take 81 mg by mouth daily. diclofenac (VOLTAREN) 1 % gel Apply 2 g to affected area four (4) times daily as needed for Pain. RT KNEE    oxyCODONE IR (ROXICODONE) 5 mg immediate release tablet Take 5 mg by mouth every four (4) hours as needed for Pain. rosuvastatin (CRESTOR) 40 mg tablet Take 40 mg by mouth daily. brexpiprazole (REXULTI) 0.5 mg tab tablet Take 1 mg by mouth two (2) times a day. busPIRone (BUSPAR) 15 mg tablet Take 30 mg by mouth two (2) times a day. fluticasone propionate (FLONASE) 50 mcg/actuation nasal spray 2 Sprays by Both Nostrils route daily as needed for Rhinitis. bumetanide (BUMEX) 2 mg tablet Take 2 mg by mouth two (2) times a day. levonorgestreL (MIRENA) 20 mcg/24 hours (7 yrs) 52 mg IUD 1 Device by IntraUTERine route once. No current facility-administered medications for this visit.      Allergies   Allergen Reactions    Metolazone Other (comments)     Caused hyponatremia    Zocor [Simvastatin] Myalgia    Lisinopril Cough    Medrol [Methylprednisolone] Other (comments)     Caused severe hyperglycemia with the medrol pack     Review of Systems: PER HPI    Physical Examination:  Blood pressure 121/64, pulse (!) 106, height 5' 4\" (1.626 m), weight 211 lb 6.4 oz (95.9 kg). General: pleasant, no distress, good eye contact, but falls asleep easily   Neck: no carotid bruits  Cardiovascular: regular, normal rate, nl s1 and s2, no m/r/g,   Respiratory: clear bilaterally  Integumentary: 2+ edema,   Psychiatric: normal mood and affect    Data Reviewed:   Component      Latest Ref Rng & Units 1/18/2023   Glucose      70 - 99 mg/dL 148 (H)   BUN      6 - 24 mg/dL 25 (H)   Creatinine      0.57 - 1.00 mg/dL 1.54 (H)   eGFR      >59 mL/min/1.73 41 (L)   BUN/Creatinine ratio      9 - 23 16   Sodium      134 - 144 mmol/L 141   Potassium      3.5 - 5.2 mmol/L 4.5   Chloride      96 - 106 mmol/L 97   CO2      20 - 29 mmol/L 27   Calcium      8.7 - 10.2 mg/dL 9.3   Protein, total      6.0 - 8.5 g/dL 6.8   Albumin      3.8 - 4.9 g/dL 4.0   GLOBULIN, TOTAL      1.5 - 4.5 g/dL 2.8   A-G Ratio      1.2 - 2.2 1.4   Bilirubin, total      0.0 - 1.2 mg/dL 0.2   Alk. phosphatase      44 - 121 IU/L 148 (H)   AST      0 - 40 IU/L 13   ALT      0 - 32 IU/L 8   Cholesterol, total      100 - 199 mg/dL 115   Triglyceride      0 - 149 mg/dL 107   HDL Cholesterol      >39 mg/dL 42   VLDL, calculated      5 - 40 mg/dL 20   LDL, calculated      0 - 99 mg/dL 53   Creatinine, urine random      Not Estab. mg/dL 37.4   Microalbumin, urine      Not Estab. ug/mL <3.0   Microalbumin/Creat. 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):  Most recent Hgb A1c was 11.9% in 3/23 up from 10.9% in 1/23 up from 10.7% in 12/22 down from 10.8% in 9/22 up from 9.4% in 6/22 down from 11.4% in 3/22 up from 9.5% in 11/21 up from 9.1% in 7/21 down from 9.6% in 4/21 stable from 1/21 up from 9.4% in 7/20 down from 9.5% in 12/19 down from 10.6% in 9/19 up from 9.3% in 7/19 down from 10.1% in 5/19 up from 10% in 1/19 up from 9.2% in 10/18 up from 9% in 7/18 up from 8.2% in 4/18 down from 8.8% in 11/17 up from 7.4% in 9/17 down from 9.4% in 2/17 up from 9.1% in 11/16 down from 10% in 3/16 up from 9.4% in 12/15 up from 9.2% in 9/15 down from 9.7% in 5/15 down from 10.3% in 1/15 up from 8.9% in 10/14 down from 11.1% in 4/14 up from 9.2% in 1/14 up from 8.6% in March 2013 down from 8.9% in November down from 9.7% in Aug 2012 up from 9.2% in October 2011 up from 8.7% in May down from 9.7% in March up from 9.1% in July 2010 down from 10.2% in January down from 13.7% in October 2009. Want her A1c under 8% to have hysterectomy. Will have her stay off the pump for now and just use injections and gave her a more detailed plan below of how to adjust her doses. - cont lantus 47 units in the morning  - dose humalog 10 units before meals + 2 units for every 50 mg/dl above 150 mg/dl  - check bs 8 times daily due to fluctuating blood sugars  - foot exam done 6/22  - microalbumin was 69 in 7/10 up to 392 in March 2011 and 621 in May down to 216 in October 2011 and 37.3 in 4/14 and up to 183 in 12/15 and 300 in 9/16, down to 85 in 4/18 and 10.2 in 9/19 and normal ever since, last in 1/23  - optho UTD 1/21      2) HTN NOS (401.9): Blood pressure was at goal < 140/90 off coreg and scotty  - cont bumex 2 mg bid      3) Hyperlipidemia (272.4): Given DM, goal LDL is < 100, non-HDL < 130, and TGs < 150. LDL was 146 in January 2010, down to 124 in July and 93 in March 2011 with taking 5 mg of crestor daily. Her crestor was changed to pravastatin by Dr. Juan Soares because of cost in Feb 2012. LDL 93 in 8/12 but her HDL was high at 138 due to ETOH intake. LDL down to 39 in 11/12 but up to 102 in 3/13. Off pravastatin at this time and previously was on 10 mg daily.  in 1/14.   Up to 167 in 4/14 so started lovastatin 20 mg daily but she couldn't afford this. She has been started on crestor 40 mg daily by crossover clinic and LDL 64 in 10/14 and 55 in 1/15. Was 37 in 3/16 so dose decreased to 20 mg daily and LDL 23 in 2/17. Was changed to lipitor 1/2 of 80 mg daily when clinic couldn't get crestor any longer and LDL 37 in 9/17. This was stopped during her hospital stay in 11/17 but was restarted by Dr. Nayeli King in 12/17 and 42 in 4/18. Up to 94 in 8/18. Down to 79 in 1/19 and 88 in 9/19 and 78 in 12/19 and 61 in 7/20 and 54 in 1/21. Changed to crestor 40 mg sometime in the spring of 2021 and LDL 86 in 11/21 and 54 in 3/22 and 52 in 6/22 and 53 in 1/23  - cont crestor 40 mg daily  - check lipids in fall 2023      4) Elevated LFTs (790.6): I told her previously that her LFTs were elevated likely due to ETOH intake so I advised her to cut back on this and her repeat LFTs were normal in 3/13 and 1/14 and 4/14 and 10/14 and 1/15 and 12/15 and 3/16. AST up to 76 in 2/17 but back to normal in 5/17 and has been normal ever since so will follow this. 5) Borderline abnormal TFT: TSH 2.1 in 12/11 but up to 4.01 in 11/12 and 4.75 in 11/17 during 2 hospital stays. Up to 5.45 in 1/19 with Dr. Nayeli King. Repeat TSH 1.24 and FT4 0.95 and TPO ab 21 in 2/19 so held on any treatment. TSH up to 3.36 in 9/19 but down to 2.61 in 12/19 and 1.59 in 7/19 and 1.48 in 7/21 and 2.88 in 11/21. Up to 4.1 in 9/22. TSH 2.97, TPO ab <9 and TG ab < 1.0 in 12/22. TSH 2.7 in 1/23  - check TSH in fall 2023        Patient Instructions   1) Keep taking lantus 47 units in the morning.     2) Dose your humalog based on the scale below if you are eating:  Blood sugar  Insulin dose          Less than 90  Eat first, 8 units       10 units    151-200  12 units      201-250  14 units     251-300  16 units      301-350  18 units      351-400  20 units  401-450  22 units  451-500  24 units  Over 500  26 units    3) If your sugar is over 200 and you are not eating, you can dose humalog on the scale below:  Blood sugar  Insulin dose          201-250  4 units     251-300  6 units      301-350  8 units      351-400  10 units  401-450  12 units  451-500  14 units  Over 500  16 units        Follow-up and Dispositions    Return 4/25/23 at 8:50am.               Copy sent to:  Dr. Sundar Bhagat

## 2023-03-28 NOTE — PATIENT INSTRUCTIONS
1) Keep taking lantus 47 units in the morning.     2) Dose your humalog based on the scale below if you are eating:  Blood sugar  Insulin dose          Less than 90  Eat first, 8 units       10 units    151-200  12 units      201-250  14 units     251-300  16 units      301-350  18 units      351-400  20 units  401-450  22 units  451-500  24 units  Over 500  26 units    3) If your sugar is over 200 and you are not eating, you can dose humalog on the scale below:  Blood sugar  Insulin dose          201-250  4 units     251-300  6 units      301-350  8 units      351-400  10 units  401-450  12 units  451-500  14 units  Over 500  16 units

## 2023-03-30 DIAGNOSIS — E10.21 TYPE 1 DIABETES MELLITUS WITH NEPHROPATHY (HCC): Primary | ICD-10-CM

## 2023-03-30 RX ORDER — BLOOD SUGAR DIAGNOSTIC
STRIP MISCELLANEOUS
Qty: 250 STRIP | Refills: 11 | Status: SHIPPED | OUTPATIENT
Start: 2023-03-30

## 2023-04-19 ENCOUNTER — TELEPHONE (OUTPATIENT)
Dept: ENDOCRINOLOGY | Age: 53
End: 2023-04-19

## 2023-04-19 NOTE — TELEPHONE ENCOUNTER
Pt LVM at 9:15 AM asking that last notes and labs be faxed to her nephrologist at [facility] [phone]. Confirmation received.  Pt notified

## 2023-04-25 ENCOUNTER — APPOINTMENT (OUTPATIENT)
Dept: ULTRASOUND IMAGING | Age: 53
DRG: 637 | End: 2023-04-25
Attending: INTERNAL MEDICINE
Payer: MEDICARE

## 2023-04-25 ENCOUNTER — APPOINTMENT (OUTPATIENT)
Dept: CT IMAGING | Age: 53
DRG: 637 | End: 2023-04-25
Attending: EMERGENCY MEDICINE
Payer: MEDICARE

## 2023-04-25 ENCOUNTER — APPOINTMENT (OUTPATIENT)
Dept: CT IMAGING | Age: 53
DRG: 637 | End: 2023-04-25
Attending: INTERNAL MEDICINE
Payer: MEDICARE

## 2023-04-25 ENCOUNTER — HOSPITAL ENCOUNTER (INPATIENT)
Age: 53
LOS: 3 days | Discharge: HOME HEALTH | DRG: 637 | End: 2023-04-28
Attending: EMERGENCY MEDICINE | Admitting: INTERNAL MEDICINE
Payer: MEDICARE

## 2023-04-25 DIAGNOSIS — E16.2 HYPOGLYCEMIA: ICD-10-CM

## 2023-04-25 DIAGNOSIS — N17.9 ACUTE RENAL FAILURE, UNSPECIFIED ACUTE RENAL FAILURE TYPE (HCC): Primary | ICD-10-CM

## 2023-04-25 LAB
ALBUMIN SERPL-MCNC: 3.3 G/DL (ref 3.5–5)
ALBUMIN/GLOB SERPL: 0.8 (ref 1.1–2.2)
ALP SERPL-CCNC: 115 U/L (ref 45–117)
ALT SERPL-CCNC: 162 U/L (ref 12–78)
AMORPH CRY URNS QL MICRO: ABNORMAL
AMPHET UR QL SCN: NEGATIVE
ANION GAP BLD CALC-SCNC: 9 (ref 10–20)
ANION GAP SERPL CALC-SCNC: 8 MMOL/L (ref 5–15)
APPEARANCE UR: ABNORMAL
AST SERPL-CCNC: 424 U/L (ref 15–37)
BACTERIA URNS QL MICRO: ABNORMAL /HPF
BARBITURATES UR QL SCN: POSITIVE
BASE EXCESS BLD CALC-SCNC: 4.3 MMOL/L
BASOPHILS # BLD: 0 K/UL (ref 0–0.1)
BASOPHILS NFR BLD: 0 % (ref 0–1)
BENZODIAZ UR QL: POSITIVE
BILIRUB SERPL-MCNC: 0.8 MG/DL (ref 0.2–1)
BILIRUB UR QL: NEGATIVE
BUN SERPL-MCNC: 81 MG/DL (ref 6–20)
BUN/CREAT SERPL: 20 (ref 12–20)
CA-I BLD-MCNC: 1.07 MMOL/L (ref 1.12–1.32)
CALCIUM SERPL-MCNC: 8.5 MG/DL (ref 8.5–10.1)
CANNABINOIDS UR QL SCN: NEGATIVE
CHLORIDE BLD-SCNC: 91 MMOL/L (ref 100–108)
CHLORIDE SERPL-SCNC: 92 MMOL/L (ref 97–108)
CO2 BLD-SCNC: 30 MMOL/L (ref 19–24)
CO2 SERPL-SCNC: 29 MMOL/L (ref 21–32)
COCAINE UR QL SCN: NEGATIVE
COLOR UR: ABNORMAL
COMMENT, HOLDF: NORMAL
COMMENT, HOLDF: NORMAL
CREAT SERPL-MCNC: 4.12 MG/DL (ref 0.55–1.02)
CREAT UR-MCNC: 4.1 MG/DL (ref 0.6–1.3)
DIFFERENTIAL METHOD BLD: ABNORMAL
DRUG SCRN COMMENT,DRGCM: ABNORMAL
EOSINOPHIL # BLD: 0.1 K/UL (ref 0–0.4)
EOSINOPHIL NFR BLD: 1 % (ref 0–7)
EPITH CASTS URNS QL MICRO: ABNORMAL /LPF
ERYTHROCYTE [DISTWIDTH] IN BLOOD BY AUTOMATED COUNT: 18.5 % (ref 11.5–14.5)
GLOBULIN SER CALC-MCNC: 4.2 G/DL (ref 2–4)
GLUCOSE BLD STRIP.AUTO-MCNC: 103 MG/DL (ref 65–117)
GLUCOSE BLD STRIP.AUTO-MCNC: 129 MG/DL (ref 65–117)
GLUCOSE BLD STRIP.AUTO-MCNC: 27 MG/DL (ref 65–117)
GLUCOSE BLD STRIP.AUTO-MCNC: 28 MG/DL (ref 74–106)
GLUCOSE BLD STRIP.AUTO-MCNC: 33 MG/DL (ref 65–117)
GLUCOSE BLD STRIP.AUTO-MCNC: 35 MG/DL (ref 65–117)
GLUCOSE BLD STRIP.AUTO-MCNC: 36 MG/DL (ref 65–117)
GLUCOSE BLD STRIP.AUTO-MCNC: 38 MG/DL (ref 65–117)
GLUCOSE BLD STRIP.AUTO-MCNC: 41 MG/DL (ref 65–117)
GLUCOSE BLD STRIP.AUTO-MCNC: 58 MG/DL (ref 65–117)
GLUCOSE BLD STRIP.AUTO-MCNC: 62 MG/DL (ref 65–117)
GLUCOSE BLD STRIP.AUTO-MCNC: 64 MG/DL (ref 65–117)
GLUCOSE BLD STRIP.AUTO-MCNC: 67 MG/DL (ref 65–117)
GLUCOSE BLD STRIP.AUTO-MCNC: 77 MG/DL (ref 65–117)
GLUCOSE BLD STRIP.AUTO-MCNC: 98 MG/DL (ref 65–117)
GLUCOSE SERPL-MCNC: 33 MG/DL (ref 65–100)
GLUCOSE UR STRIP.AUTO-MCNC: NEGATIVE MG/DL
HCO3 BLDA-SCNC: 31 MMOL/L
HCT VFR BLD AUTO: 31.6 % (ref 35–47)
HGB BLD-MCNC: 9.9 G/DL (ref 11.5–16)
HGB UR QL STRIP: ABNORMAL
IMM GRANULOCYTES # BLD AUTO: 0 K/UL (ref 0–0.04)
IMM GRANULOCYTES NFR BLD AUTO: 0 % (ref 0–0.5)
KETONES UR QL STRIP.AUTO: NEGATIVE MG/DL
LACTATE BLD-SCNC: 1.11 MMOL/L (ref 0.4–2)
LEUKOCYTE ESTERASE UR QL STRIP.AUTO: NEGATIVE
LYMPHOCYTES # BLD: 1.7 K/UL (ref 0.8–3.5)
LYMPHOCYTES NFR BLD: 20 % (ref 12–49)
MCH RBC QN AUTO: 24.3 PG (ref 26–34)
MCHC RBC AUTO-ENTMCNC: 31.3 G/DL (ref 30–36.5)
MCV RBC AUTO: 77.6 FL (ref 80–99)
METHADONE UR QL: NEGATIVE
MONOCYTES # BLD: 0.8 K/UL (ref 0–1)
MONOCYTES NFR BLD: 9 % (ref 5–13)
NEUTS SEG # BLD: 6.3 K/UL (ref 1.8–8)
NEUTS SEG NFR BLD: 70 % (ref 32–75)
NITRITE UR QL STRIP.AUTO: NEGATIVE
NRBC # BLD: 0 K/UL (ref 0–0.01)
NRBC BLD-RTO: 0 PER 100 WBC
OPIATES UR QL: POSITIVE
PCO2 BLDV: 55.8 MMHG (ref 41–51)
PCP UR QL: NEGATIVE
PH BLDV: 7.35 (ref 7.32–7.42)
PH UR STRIP: 5 (ref 5–8)
PLATELET # BLD AUTO: 319 K/UL (ref 150–400)
PMV BLD AUTO: 10 FL (ref 8.9–12.9)
PO2 BLDV: 19 MMHG (ref 25–40)
POTASSIUM BLD-SCNC: 4.1 MMOL/L (ref 3.5–5.5)
POTASSIUM SERPL-SCNC: 4 MMOL/L (ref 3.5–5.1)
PROT SERPL-MCNC: 7.5 G/DL (ref 6.4–8.2)
PROT UR STRIP-MCNC: 30 MG/DL
RBC # BLD AUTO: 4.07 M/UL (ref 3.8–5.2)
RBC #/AREA URNS HPF: ABNORMAL /HPF (ref 0–5)
SAMPLES BEING HELD,HOLD: NORMAL
SAMPLES BEING HELD,HOLD: YELLOW
SAO2 % BLD: 25 %
SERVICE CMNT-IMP: ABNORMAL
SERVICE CMNT-IMP: NORMAL
SODIUM BLD-SCNC: 130 MMOL/L (ref 136–145)
SODIUM SERPL-SCNC: 129 MMOL/L (ref 136–145)
SP GR UR REFRACTOMETRY: 1.01
SPECIMEN SITE: ABNORMAL
UA: UC IF INDICATED,UAUC: ABNORMAL
UROBILINOGEN UR QL STRIP.AUTO: 0.2 EU/DL (ref 0.2–1)
WBC # BLD AUTO: 8.9 K/UL (ref 3.6–11)
WBC URNS QL MICRO: ABNORMAL /HPF (ref 0–4)

## 2023-04-25 PROCEDURE — 94760 N-INVAS EAR/PLS OXIMETRY 1: CPT

## 2023-04-25 PROCEDURE — 74011000250 HC RX REV CODE- 250

## 2023-04-25 PROCEDURE — 74011250637 HC RX REV CODE- 250/637: Performed by: PHYSICIAN ASSISTANT

## 2023-04-25 PROCEDURE — 74176 CT ABD & PELVIS W/O CONTRAST: CPT

## 2023-04-25 PROCEDURE — 36415 COLL VENOUS BLD VENIPUNCTURE: CPT

## 2023-04-25 PROCEDURE — 70450 CT HEAD/BRAIN W/O DYE: CPT

## 2023-04-25 PROCEDURE — 65270000029 HC RM PRIVATE

## 2023-04-25 PROCEDURE — 81001 URINALYSIS AUTO W/SCOPE: CPT

## 2023-04-25 PROCEDURE — 74011250636 HC RX REV CODE- 250/636: Performed by: EMERGENCY MEDICINE

## 2023-04-25 PROCEDURE — 74011250637 HC RX REV CODE- 250/637: Performed by: INTERNAL MEDICINE

## 2023-04-25 PROCEDURE — 74011000250 HC RX REV CODE- 250: Performed by: INTERNAL MEDICINE

## 2023-04-25 PROCEDURE — 80307 DRUG TEST PRSMV CHEM ANLYZR: CPT

## 2023-04-25 PROCEDURE — 85025 COMPLETE CBC W/AUTO DIFF WBC: CPT

## 2023-04-25 PROCEDURE — 82962 GLUCOSE BLOOD TEST: CPT

## 2023-04-25 PROCEDURE — 76770 US EXAM ABDO BACK WALL COMP: CPT

## 2023-04-25 PROCEDURE — 74011250636 HC RX REV CODE- 250/636: Performed by: INTERNAL MEDICINE

## 2023-04-25 PROCEDURE — 82947 ASSAY GLUCOSE BLOOD QUANT: CPT

## 2023-04-25 PROCEDURE — 80053 COMPREHEN METABOLIC PANEL: CPT

## 2023-04-25 PROCEDURE — 99222 1ST HOSP IP/OBS MODERATE 55: CPT | Performed by: INTERNAL MEDICINE

## 2023-04-25 PROCEDURE — 99285 EMERGENCY DEPT VISIT HI MDM: CPT

## 2023-04-25 RX ORDER — CITALOPRAM 20 MG/1
40 TABLET, FILM COATED ORAL DAILY
Status: DISCONTINUED | OUTPATIENT
Start: 2023-04-26 | End: 2023-04-28 | Stop reason: HOSPADM

## 2023-04-25 RX ORDER — POLYETHYLENE GLYCOL 3350 17 G/17G
17 POWDER, FOR SOLUTION ORAL DAILY PRN
Status: DISCONTINUED | OUTPATIENT
Start: 2023-04-25 | End: 2023-04-28 | Stop reason: HOSPADM

## 2023-04-25 RX ORDER — BUPROPION HYDROCHLORIDE 150 MG/1
150 TABLET, EXTENDED RELEASE ORAL DAILY
Status: DISCONTINUED | OUTPATIENT
Start: 2023-04-26 | End: 2023-04-28 | Stop reason: HOSPADM

## 2023-04-25 RX ORDER — MIRTAZAPINE 15 MG/1
15 TABLET, FILM COATED ORAL DAILY
Status: DISCONTINUED | OUTPATIENT
Start: 2023-04-26 | End: 2023-04-28 | Stop reason: HOSPADM

## 2023-04-25 RX ORDER — DEXTROSE MONOHYDRATE 100 MG/ML
0-250 INJECTION, SOLUTION INTRAVENOUS AS NEEDED
Status: DISCONTINUED | OUTPATIENT
Start: 2023-04-25 | End: 2023-04-28 | Stop reason: HOSPADM

## 2023-04-25 RX ORDER — ASPIRIN 81 MG/1
81 TABLET ORAL DAILY
Status: DISCONTINUED | OUTPATIENT
Start: 2023-04-26 | End: 2023-04-28 | Stop reason: HOSPADM

## 2023-04-25 RX ORDER — ALPRAZOLAM 2 MG/1
2 TABLET ORAL 3 TIMES DAILY
COMMUNITY
Start: 2023-04-15

## 2023-04-25 RX ORDER — CARVEDILOL 6.25 MG/1
6.25 TABLET ORAL 2 TIMES DAILY WITH MEALS
COMMUNITY

## 2023-04-25 RX ORDER — GABAPENTIN 300 MG/1
300 CAPSULE ORAL 3 TIMES DAILY
Status: ON HOLD | COMMUNITY
End: 2023-04-26

## 2023-04-25 RX ORDER — BUSPIRONE HYDROCHLORIDE 10 MG/1
30 TABLET ORAL 2 TIMES DAILY
Status: DISCONTINUED | OUTPATIENT
Start: 2023-04-25 | End: 2023-04-28 | Stop reason: HOSPADM

## 2023-04-25 RX ORDER — HEPARIN SODIUM 5000 [USP'U]/ML
5000 INJECTION, SOLUTION INTRAVENOUS; SUBCUTANEOUS EVERY 8 HOURS
Status: DISCONTINUED | OUTPATIENT
Start: 2023-04-25 | End: 2023-04-28 | Stop reason: HOSPADM

## 2023-04-25 RX ORDER — METOCLOPRAMIDE 10 MG/1
5 TABLET ORAL 2 TIMES DAILY
Status: DISCONTINUED | OUTPATIENT
Start: 2023-04-25 | End: 2023-04-27

## 2023-04-25 RX ORDER — DEXTROSE MONOHYDRATE 100 MG/ML
100 INJECTION, SOLUTION INTRAVENOUS CONTINUOUS
Status: DISCONTINUED | OUTPATIENT
Start: 2023-04-25 | End: 2023-04-27

## 2023-04-25 RX ORDER — ACETAMINOPHEN 325 MG/1
650 TABLET ORAL
Status: DISCONTINUED | OUTPATIENT
Start: 2023-04-25 | End: 2023-04-28 | Stop reason: HOSPADM

## 2023-04-25 RX ORDER — OMEPRAZOLE 40 MG/1
40 CAPSULE, DELAYED RELEASE ORAL DAILY
Status: ON HOLD | COMMUNITY
End: 2023-04-26

## 2023-04-25 RX ORDER — ONDANSETRON 2 MG/ML
4 INJECTION INTRAMUSCULAR; INTRAVENOUS
Status: DISCONTINUED | OUTPATIENT
Start: 2023-04-25 | End: 2023-04-25

## 2023-04-25 RX ORDER — ATORVASTATIN CALCIUM 40 MG/1
40 TABLET, FILM COATED ORAL DAILY
Status: ON HOLD | COMMUNITY
End: 2023-04-26

## 2023-04-25 RX ORDER — SODIUM CHLORIDE 9 MG/ML
100 INJECTION, SOLUTION INTRAVENOUS CONTINUOUS
Status: DISCONTINUED | OUTPATIENT
Start: 2023-04-25 | End: 2023-04-28 | Stop reason: HOSPADM

## 2023-04-25 RX ORDER — ARIPIPRAZOLE 30 MG/1
30 TABLET ORAL DAILY
COMMUNITY
Start: 2023-04-12

## 2023-04-25 RX ORDER — HYDROXYZINE HYDROCHLORIDE 10 MG/1
25 TABLET, FILM COATED ORAL
Status: DISCONTINUED | OUTPATIENT
Start: 2023-04-25 | End: 2023-04-28 | Stop reason: HOSPADM

## 2023-04-25 RX ORDER — IBUPROFEN 200 MG
4 TABLET ORAL AS NEEDED
Status: DISCONTINUED | OUTPATIENT
Start: 2023-04-25 | End: 2023-04-26

## 2023-04-25 RX ORDER — DEXTROSE MONOHYDRATE 100 MG/ML
INJECTION, SOLUTION INTRAVENOUS
Status: COMPLETED
Start: 2023-04-25 | End: 2023-04-25

## 2023-04-25 RX ORDER — ONDANSETRON 2 MG/ML
4 INJECTION INTRAMUSCULAR; INTRAVENOUS
Status: DISCONTINUED | OUTPATIENT
Start: 2023-04-25 | End: 2023-04-28 | Stop reason: HOSPADM

## 2023-04-25 RX ORDER — ALBUTEROL SULFATE 0.83 MG/ML
2.5 SOLUTION RESPIRATORY (INHALATION)
Status: DISCONTINUED | OUTPATIENT
Start: 2023-04-25 | End: 2023-04-28 | Stop reason: HOSPADM

## 2023-04-25 RX ORDER — DEXTROSE MONOHYDRATE 100 MG/ML
250 INJECTION, SOLUTION INTRAVENOUS ONCE
Status: COMPLETED | OUTPATIENT
Start: 2023-04-25 | End: 2023-04-25

## 2023-04-25 RX ORDER — ONDANSETRON 4 MG/1
4 TABLET, ORALLY DISINTEGRATING ORAL
Status: DISCONTINUED | OUTPATIENT
Start: 2023-04-25 | End: 2023-04-28 | Stop reason: HOSPADM

## 2023-04-25 RX ORDER — ALBUTEROL SULFATE 90 UG/1
2 AEROSOL, METERED RESPIRATORY (INHALATION)
Status: DISCONTINUED | OUTPATIENT
Start: 2023-04-25 | End: 2023-04-25 | Stop reason: CLARIF

## 2023-04-25 RX ORDER — FAMOTIDINE 20 MG/1
40 TABLET, FILM COATED ORAL DAILY
Status: DISCONTINUED | OUTPATIENT
Start: 2023-04-26 | End: 2023-04-25

## 2023-04-25 RX ORDER — SODIUM CHLORIDE 0.9 % (FLUSH) 0.9 %
5-40 SYRINGE (ML) INJECTION AS NEEDED
Status: DISCONTINUED | OUTPATIENT
Start: 2023-04-25 | End: 2023-04-28 | Stop reason: HOSPADM

## 2023-04-25 RX ORDER — INSULIN GLARGINE 100 [IU]/ML
10 INJECTION, SOLUTION SUBCUTANEOUS
Status: COMPLETED | OUTPATIENT
Start: 2023-04-25 | End: 2023-04-26

## 2023-04-25 RX ORDER — FAMOTIDINE 20 MG/1
20 TABLET, FILM COATED ORAL DAILY
Status: DISCONTINUED | OUTPATIENT
Start: 2023-04-26 | End: 2023-04-28 | Stop reason: HOSPADM

## 2023-04-25 RX ORDER — SODIUM CHLORIDE 0.9 % (FLUSH) 0.9 %
5-40 SYRINGE (ML) INJECTION EVERY 8 HOURS
Status: DISCONTINUED | OUTPATIENT
Start: 2023-04-25 | End: 2023-04-28 | Stop reason: HOSPADM

## 2023-04-25 RX ORDER — MORPHINE SULFATE 100 MG/1
100 TABLET, FILM COATED, EXTENDED RELEASE ORAL EVERY 12 HOURS
COMMUNITY

## 2023-04-25 RX ORDER — ACETAMINOPHEN 325 MG/1
650 TABLET ORAL
Status: DISCONTINUED | OUTPATIENT
Start: 2023-04-25 | End: 2023-04-25

## 2023-04-25 RX ORDER — ACETAMINOPHEN 650 MG/1
650 SUPPOSITORY RECTAL
Status: DISCONTINUED | OUTPATIENT
Start: 2023-04-25 | End: 2023-04-28 | Stop reason: HOSPADM

## 2023-04-25 RX ADMIN — BUSPIRONE HYDROCHLORIDE 30 MG: 10 TABLET ORAL at 20:55

## 2023-04-25 RX ADMIN — SODIUM CHLORIDE, PRESERVATIVE FREE 10 ML: 5 INJECTION INTRAVENOUS at 21:04

## 2023-04-25 RX ADMIN — HYDROXYZINE HYDROCHLORIDE 25 MG: 10 TABLET ORAL at 21:53

## 2023-04-25 RX ADMIN — SODIUM CHLORIDE 100 ML/HR: 9 INJECTION, SOLUTION INTRAVENOUS at 15:18

## 2023-04-25 RX ADMIN — SODIUM CHLORIDE, PRESERVATIVE FREE 5 ML: 5 INJECTION INTRAVENOUS at 14:13

## 2023-04-25 RX ADMIN — DEXTROSE MONOHYDRATE 250 ML: 100 INJECTION, SOLUTION INTRAVENOUS at 11:41

## 2023-04-25 RX ADMIN — SODIUM CHLORIDE 1000 ML: 9 INJECTION, SOLUTION INTRAVENOUS at 13:41

## 2023-04-25 RX ADMIN — METOCLOPRAMIDE 5 MG: 10 TABLET ORAL at 20:55

## 2023-04-25 RX ADMIN — DEXTROSE MONOHYDRATE 250 ML: 100 INJECTION, SOLUTION INTRAVENOUS at 17:42

## 2023-04-25 RX ADMIN — DEXTROSE MONOHYDRATE 250 ML: 100 INJECTION, SOLUTION INTRAVENOUS at 21:55

## 2023-04-25 RX ADMIN — DEXTROSE MONOHYDRATE: 10 INJECTION, SOLUTION INTRAVENOUS at 13:55

## 2023-04-25 RX ADMIN — HEPARIN SODIUM 5000 UNITS: 5000 INJECTION INTRAVENOUS; SUBCUTANEOUS at 20:58

## 2023-04-25 RX ADMIN — BREXPIPRAZOLE 1 MG: 1 TABLET ORAL at 20:53

## 2023-04-25 RX ADMIN — HEPARIN SODIUM 5000 UNITS: 5000 INJECTION INTRAVENOUS; SUBCUTANEOUS at 17:02

## 2023-04-25 RX ADMIN — DEXTROSE MONOHYDRATE 40 ML/HR: 100 INJECTION, SOLUTION INTRAVENOUS at 14:13

## 2023-04-25 NOTE — ED NOTES
Pt given 8oz orange juice to drink. She is alert and able to swallow.  She passed out at home, was given by glucagon IM by her father    57 given an additional 8oz orange juice

## 2023-04-25 NOTE — ED NOTES
eTRANSFER SBAR NOTE    IP UNIT CALLED NOTE IS READY: Yes Spoke to Cele (receiving unit sec, tech or Nurse) 8231    IF there are questions Call transferring nurse (your name) Aguilar Blackmon at phone # [phone]    SITUATION/BACKGROUND:    Patient is being transferred to [facility] Tele, Room# [id number]    Patient's Chief Complaint on arrival to ED was low blood sugar and is admitted for hypoglycemia, acute renal failure. CODE STATUS: Full Code    VITAL SIGNS (MUST BE WITHIN 1 HOUR OF TRANSFER TO IP UNIT:    TEMP: 98.2  PULSE: 89  RESP: 12  BP: 105/58  PAIN SCORE: 0  MEWS: 0     ISOLATION/PRECAUTIONS: No   ISOLATION TYPE: n/a    Called outstanding consults: Yes     Are there sign and held orders that need to be released? No   Are all STAT orders completed: Yes    STAT labs collected: Yes  REPEAT LACTIC ACID DUE? No  TIME DUE:   Critical Labs Results? Yes What? BGL    PHARMACY NEEDS:  Are there any titrating drips? No   If so what? Are there STAT meds  that need to be given? No     The following personal items will be sent with the patient during transfer to the floor:   All valuables:   ITEM:    ITEM: Visual Aid: None  ITEM:           ASSESSMENT:    CIWA Assessment: No  Last Score:     NEURO:   NIH SCORE:        MAURO SCREENING:   Swallow Screening  Is the Patient Unable to Remain Alert for Testing?: No (04/25/23 1138)  Is the Patient on a Modified Diet (Thickened Liquids) Due to Pre-existing Dysphagia?: No (04/25/23 1138)  Is There Presence of Existing Enteral Tube Feeding via the Stomach or Nose?: No (04/25/23 1138)  Is There Presence of Head-of-Bed Restrictions (Less than 30 Degrees)?: No (04/25/23 1138)  Is There Presence of Tracheotomy Tube?: No (04/25/23 1138)  Is the Patient Ordered Nothing-by-Mouth Status?: No (04/25/23 1138)  3 oz Water Swallow Screen: Pass (04/25/23 1138)      NEURO ASSESSMENT:   Neuro  Neurologic State: Alert (04/25/23 1129)  Orientation Level: Oriented to person, Oriented to place, Disoriented to time, Disoriented to situation (23)  Cognition: Follows commands, Decreased attention/concentration (23)  Speech: Slurred (23)    If a Stroke Case . .. When are the next V/S, nuero, NIH due? N/a    Is patient impulsive? Yes  Is patient oriented? No    Do they follow commands? Yes  Is the patient ambulatory? No  Device need     FALL RISK? Yes   Is the [place] 1 Assessment completed? Yes  INTERVENTIONS: bed alarm    INTEGUMENTARY:   IS THE PATIENT UNDRESSED? Yes  WOUNDS PRESENT? Yes On admit to ED Yes  ARE THE WOUNDS DOCUMENTED? Yes    RESPIRATORY:   Is patient on Oxygen? Yes   OXYGEN: Oxygen Therapy  O2 Device: Nasal cannula (23)  O2 Flow Rate (L/min): 3 l/min (23)  IS patient on VENT? No      CARDIAC:   Is cardiac monitoring ordered? Yes Last Rhythm: NSR  Patient to transfer with tele box on? Yes  Is patient using a LIFE VEST? No     LINE ACCESS:   Peripheral IV  Left Basilic (Active)        /GI:   CONTINENT BOWEL/BLADDER? Yes  URINARY OUTPUT: chew   Written Order for Chew Cath? Yes  CHRONIC OR ACUTE? ACUTE   If CHRONIC, is it 1days old, was it changed prior to specimen collection? N/A  WAS UA WITH REFLEX SENT TO LAB? Yes IF NO,  COLLECT AND SEND PRIOR TO TRANSPORT TO INPATIENT AREA    RESTRAINTS IN USE: No      IS DOCUMENTATION COMPLETE:   Is there a current Order? When does it ?      Additional details as Needed:

## 2023-04-25 NOTE — ED NOTES
mepelex placed on sacrum    1345 two consecutively low BGLs, pt is A+Ox4, but sluggish. Able to swallow, provided 8oz orange juice.  MD notified, overrode D10 from wards

## 2023-04-25 NOTE — H&P
Hospitalist Admission Note    NAME: Esha Espinosa   :  1970   MRN:  [id number]     Date/Time:  2023 2:14 PM    Patient PCP: Edda Abrams MD  ______________________________________________________________________  Given the patient's current clinical presentation, I have a high level of concern for decompensation if discharged from the emergency department. Complex decision making was performed, which includes reviewing the patient's available past medical records, laboratory results, and x-ray films. My assessment of this patient's clinical condition and my plan of care is as follows. Assessment / Plan:  Estimated discharge date:   Barriers: Improvement of blood sugar and also creatinine    Hyperglycemia  Diabetes mellitus uncontrolled  -Patient is coming in with blood sugars in the low 30s at home  -Recent A1c in January was 10.9  -Received multiple doses of dextrose in the emergency department but still hypoglycemic so we will start D10 drip  -Hold all insulin for now  -Start regular diet if she passes swallow eval  -Given her recurrent admissions with DKA and also hypoglycemia, will consult endocrinology for further evaluation    Acute kidney injury  -Baseline creatinine is around 1.5-2 and currently creatinine is 4.1  -Check urinalysis  -not on nephrotoxic medications at home  -Start IV fluids normal saline and repeat BMP in a.m. tomorrow  -Monitor urine output and consider Rankin  -If creatinine not improving, consider nephrology evaluation  -Check ultrasound renal    Acute transaminitis  -ALT and AST are both elevated 162 on 424. Total bilirubin is within normal limits  -Patient's father does not report any frequent alcohol use  -Check PT/INR  -Check CMP in a.m. tomorrow we will check hepatitis panel.   Follow results of ultrasound abdomen    Acute metabolic encephalopathy likely related to hypoglycemia  -She is currently alert awake but confused  -Exam is nonfocal and is able to move all 4 extremities  -Check CT of head  -Consider further work-up if no improvement in mental status after correction of blood sugars    Mild hyponatremia  -Currently sodium is 129%. Start IV fluids with normal saline repeat BMP in a.m. tomorrow        Congestive heart failure  Chronic respiratory failure due to COPD on 3 L  Depression  PTSD  GERD  Radiculopathy  -Continue home Rexulti, Wellbutrin, BuSpar, Celexa. Mental status is worsening, we may have to hold some of her psych medications.  -Continue home famotidine  -Continue home regimen Remeron  Precautions IV fluids to potentially congestive heart failure                    Medical Decision Making:   I personally reviewed labs: CBC, CMP  I personally reviewed imaging: CT abdomen  Toxic drug monitoring: Monitor blood sugars  Discussed case with: ED provider. After discussion I am in agreement that acuity of patient's medical condition necessitates hospital stay. Code Status: Full code  DVT Prophylaxis: Lovenox  GI Prophylaxis: PT famotidine  Baseline: From home, lives with wife and    Subjective:   CHIEF COMPLAINT: Low blood sugar    HISTORY OF PRESENT ILLNESS:     Pratibha Terrell is a 47 y.o.  female with PMHx significant for diabetes mellitus, depression, gastroesophageal reflux disease, congestive heart failure is coming the hospital chief complaints of low blood sugar. Patient is a poor historian so information is obtained by talking to patient's dad who is at the bedside. According to him, patient had a recent admission to [facility] for diabetic ketoacidosis about 1 month ago. She started having low blood sugars in the last 1 week and often blood pressure blood sugar was dropping into the 30s and 40s for which she received glucagon this morning and EMS was summoned. Patient is currently alert awake, confused and is not able to provide much information so information is limited.   According to dad, patient had some confusion but did not really report any symptoms and lives with boyfriend and may have not been eating well and may even have missed some insulin doses. On arrival to ED, noted to have stable vitals. On labs noted to have low blood sugar, hemoglobin 9.9, BMP shows a sodium of 129, glucose of 33, creatinine of 4.12. ALT of 162 and AST of 424. CT abdomen showed IUD in the uterus but no other acute process. We were asked to admit for work up and evaluation of the above problems.      Past Medical History:   Diagnosis Date    Achilles tendon rupture     along with torn right patellar/femoral ligament    Arthritis     rt. foot    Chronic kidney disease     Chronic pain     abdominal pain    Diabetes (HCC)     IDDM on insulin pump and sensor    DM type 1 (diabetes mellitus, type 1) ([facility] [address]     age 25    Endometriosis     s/p ex-lap 4x    Gastric ulcer     GERD (gastroesophageal reflux disease)     Herpes     Other and unspecified hyperlipidemia     Panic attacks     Psychiatric disorder     anxiety, panic attacks    PTSD (post-traumatic stress disorder)     Unspecified essential hypertension         Past Surgical History:   Procedure Laterality Date    HX COLONOSCOPY      HX GYN      2 d&c's    HX GYN      laparoscopies x5 for endometriosis    HX GYN      mirena in place    HX ORTHOPAEDIC  2002    achiles tendon repair,talus repair    HX ORTHOPAEDIC      injections x2 to right shoulder    HX ORTHOPAEDIC Left 02/07/2019    3rd trigger finger release    [place] UNLISTED PROCEDURE CARDIAC SURGERY         Social History     Tobacco Use    Smoking status: Every Day     Packs/day: 0.50     Years: 28.00     Pack years: 14.00     Types: Cigarettes    Smokeless tobacco: Current    Tobacco comments:     Decreased to a half a pack    Substance Use Topics    Alcohol use: Not Currently     Comment: a beer every few months        Family History   Problem Relation Age of Onset    Diabetes Mother         diet controlled    Diabetes Father         R foot amupation Liver Disease Father     Heart Disease Paternal Uncle         MI in his 46s    Stroke Neg Hx      Allergies   Allergen Reactions    Metolazone Other (comments)     Caused hyponatremia    Zocor [Simvastatin] Myalgia    Lisinopril Cough    Medrol [Methylprednisolone] Other (comments)     Caused severe hyperglycemia with the medrol pack        Prior to Admission medications    Medication Sig Start Date End Date Taking? Authorizing Provider   Insulin Syringe-Needle U-100 1 mL 31 gauge x 5/16 syrg Use as directed 4 times daily 4/12/23   Allison Carnes MD   insulin glargine (LANTUS) 100 unit/mL injection 56 Units by SubCUTAneous route daily. 4/12/23   Allison Carnes MD   Accu-Chek Guide test strips strip USE TO TEST UP TO 8 TIMES DAILY 3/30/23   Allison Carnes MD   insulin lispro (HUMALOG) 100 unit/mL injection Inject 10 units before meals + 2 units for every 50 mg/dl above 150 mg/dl--max 65 units/day 3/28/23   Allison Carnes MD   Insulin Syringe-Needle U-100 0.5 mL 31 gauge x 5/16\" syrg Use as directed 4 times daily 3/28/23   Allison Carnes MD   buPROPion SR [facility] SR) 150 mg SR tablet Take 150 mg by mouth daily. Provider, Historical   gabapentin (NEURONTIN) 600 mg tablet Take 0.5 Tablets by mouth three (3) times daily. Max Daily Amount: 900 mg. Replaces the 300 mg caps 3/10/23   Karrie Bruno MD   butalbital-acetaminophen-caffeine (FIORICET, ESGIC) -40 mg per tablet Take 1 Tablet by mouth every six (6) hours as needed for Headache. 2/13/23   Provider, Historical   albuterol (PROVENTIL HFA, VENTOLIN HFA, PROAIR HFA) 90 mcg/actuation inhaler Take 2 Puffs by inhalation every six (6) hours as needed for Shortness of Breath. 2/28/23   Provider, Historical   citalopram (CELEXA) 40 mg tablet Take 40 mg by mouth daily. 2/7/23   Provider, Historical   nystatin (MYCOSTATIN) powder Apply  to affected area.  2/13/23   Provider, Historical   famotidine (PEPCID) 40 mg tablet Take 40 mg by mouth daily. 23   Provider, Historical   metoclopramide HCl (REGLAN) 5 mg tablet Take 1 Tablet by mouth two (2) times a day. 22   Temi Arce MD   glucagon (Glucagon Emergency Kit, human,) 1 mg solr USE AS DIRECTED IN KIT INSTRUCTIONS 22   Temi Arce MD   naloxone (Narcan) 4 mg/actuation nasal spray Use 1 spray intranasally, then discard. Repeat with new spray every 2 min as needed for opioid overdose symptoms, alternating nostrils. 22   Akosua Gonzáles MD   mirtazapine (REMERON) 15 mg tablet Take 15 mg by mouth daily. 3/4/22   Provider, Historical   aspirin delayed-release 81 mg tablet Take 81 mg by mouth daily. Provider, Historical   diclofenac (VOLTAREN) 1 % gel Apply 2 g to affected area four (4) times daily as needed for Pain. RT KNEE    Provider, Historical   oxyCODONE IR (ROXICODONE) 5 mg immediate release tablet Take 5 mg by mouth every four (4) hours as needed for Pain. Provider, Historical   rosuvastatin (CRESTOR) 40 mg tablet Take 40 mg by mouth daily. Provider, Historical   brexpiprazole (REXULTI) 0.5 mg tab tablet Take 1 mg by mouth two (2) times a day. Provider, Historical   busPIRone (BUSPAR) 15 mg tablet Take 30 mg by mouth two (2) times a day. Provider, Historical   fluticasone propionate (FLONASE) 50 mcg/actuation nasal spray 2 Sprays by Both Nostrils route daily as needed for Rhinitis. 9/10/20   Lino Gonsalez III, DO   bumetanide (BUMEX) 2 mg tablet Take 2 mg by mouth two (2) times a day. 20   Temi Arce MD   levonorgestreL (MIRENA) 20 mcg/24 hours (7 yrs) 52 mg IUD 1 Device by IntraUTERine route once.     Provider, Historical         Objective:   VITALS:    Patient Vitals for the past 24 hrs:   BP Temp Pulse Resp SpO2   23 1120 114/70 98.1 °F (36.7 °C) 94 20 96 %       Temp (24hrs), Av.1 °F (36.7 °C), Min:98.1 °F (36.7 °C), Max:98.1 °F (36.7 °C)           Wt Readings from Last 12 Encounters:   23 95.9 kg (211 lb 6.4 oz)   03/09/23 94.9 kg (209 lb 3.5 oz)   12/12/22 102.5 kg (226 lb)   09/13/22 105 kg (231 lb 6.4 oz)   06/30/22 106.7 kg (235 lb 3.7 oz)   06/16/22 101.7 kg (224 lb 3.2 oz)   05/14/22 99.8 kg (220 lb)   04/13/22 99.8 kg (220 lb)   03/10/22 99.8 kg (220 lb)   12/09/21 101.5 kg (223 lb 12.8 oz)   09/14/21 97.2 kg (214 lb 3.2 oz)   07/29/21 91.7 kg (202 lb 1.6 oz)         PHYSICAL EXAM:  General:    no distress, appears stated age. Lungs:   CTA b/l. No wheezing or Rhonchi. No rales. Chest wall:  No tenderness. No accessory muscle use. Heart:   Regular  rhythm,  No  Murmur. No edema  Abdomen:   Soft, NT. ND  BS+  Extremities: No cyanosis. No clubbing,      Skin turgor normal, Radial dial pulse 2+.  Capillary refill normal  Neurologic: Alert, awake, confused, able to move all 4 extremities        LAB DATA REVIEWED:    Recent Results (from the past 12 hour(s))   GLUCOSE, POC    Collection Time: 04/25/23 11:22 AM   Result Value Ref Range    Glucose (POC) 35 (LL) 65 - 117 mg/dL    Performed by [address], POC    Collection Time: 04/25/23 11:24 AM   Result Value Ref Range    Glucose (POC) 36 (LL) 65 - 117 mg/dL    Performed by [address], POC    Collection Time: 04/25/23 11:40 AM   Result Value Ref Range    Glucose (POC) 27 (LL) 65 - 117 mg/dL    Performed by Brendon Valdovinos RN    BLOOD GAS,CHEM8,LACTIC ACID POC    Collection Time: 04/25/23 11:55 AM   Result Value Ref Range    pH, venous (POC) 7.35 7.32 - 7.42      pCO2, venous (POC) 55.8 (H) 41 - 51 MMHG    pO2, venous (POC) 19 (L) 25 - 40 mmHg    BICARBONATE 31 mmol/L    Base excess (POC) 4.3 mmol/L    O2 SAT 25 %    Sodium,  (L) 136 - 145 MMOL/L    Potassium, POC 4.1 3.5 - 5.5 MMOL/L    Chloride, POC 91 (L) 100 - 108 MMOL/L    CO2, POC 30 (H) 19 - 24 MMOL/L    Anion gap, POC 9 (L) 10 - 20      Glucose, POC 28 (LL) 74 - 106 MG/DL    Creatinine, POC 4.1 (H) 0.6 - 1.3 MG/DL    eGFR (POC) 12 (L) >60 ml/min/1.73m2 Calcium, ionized (POC) 1.07 (L) 1.12 - 1.32 mmol/L    Lactic Acid (POC) 1.11 0.40 - 2.00 mmol/L    Sample source VENOUS BLOOD      Critical value read back [place]    CBC WITH AUTOMATED DIFF    Collection Time: 04/25/23 12:00 PM   Result Value Ref Range    WBC 8.9 3.6 - 11.0 K/uL    RBC 4.07 3.80 - 5.20 M/uL    HGB 9.9 (L) 11.5 - 16.0 g/dL    HCT 31.6 (L) 35.0 - 47.0 %    MCV 77.6 (L) 80.0 - 99.0 FL    MCH 24.3 (L) 26.0 - 34.0 PG    MCHC 31.3 30.0 - 36.5 g/dL    RDW 18.5 (H) 11.5 - 14.5 %    PLATELET 969 691 - 075 K/uL    MPV 10.0 8.9 - 12.9 FL    NRBC 0.0 0  WBC    ABSOLUTE NRBC 0.00 0.00 - 0.01 K/uL    NEUTROPHILS 70 32 - 75 %    LYMPHOCYTES 20 12 - 49 %    MONOCYTES 9 5 - 13 %    EOSINOPHILS 1 0 - 7 %    BASOPHILS 0 0 - 1 %    IMMATURE GRANULOCYTES 0 0.0 - 0.5 %    ABS. NEUTROPHILS 6.3 1.8 - 8.0 K/UL    ABS. LYMPHOCYTES 1.7 0.8 - 3.5 K/UL    ABS. MONOCYTES 0.8 0.0 - 1.0 K/UL    ABS. EOSINOPHILS 0.1 0.0 - 0.4 K/UL    ABS. BASOPHILS 0.0 0.0 - 0.1 K/UL    ABS. IMM. GRANS. 0.0 0.00 - 0.04 K/UL    DF AUTOMATED     METABOLIC PANEL, COMPREHENSIVE    Collection Time: 04/25/23 12:00 PM   Result Value Ref Range    Sodium 129 (L) 136 - 145 mmol/L    Potassium 4.0 3.5 - 5.1 mmol/L    Chloride 92 (L) 97 - 108 mmol/L    CO2 29 21 - 32 mmol/L    Anion gap 8 5 - 15 mmol/L    Glucose 33 (LL) 65 - 100 mg/dL    BUN 81 (H) 6 - 20 MG/DL    Creatinine 4.12 (H) 0.55 - 1.02 MG/DL    BUN/Creatinine ratio 20 12 - 20      eGFR 12 (L) >60 ml/min/1.73m2    Calcium 8.5 8.5 - 10.1 MG/DL    Bilirubin, total 0.8 0.2 - 1.0 MG/DL    ALT (SGPT) 162 (H) 12 - 78 U/L    AST (SGOT) 424 (H) 15 - 37 U/L    Alk.  phosphatase 115 45 - 117 U/L    Protein, total 7.5 6.4 - 8.2 g/dL    Albumin 3.3 (L) 3.5 - 5.0 g/dL    Globulin 4.2 (H) 2.0 - 4.0 g/dL    A-G Ratio 0.8 (L) 1.1 - 2.2     SAMPLES BEING HELD    Collection Time: 04/25/23 12:00 PM   Result Value Ref Range    SAMPLES BEING HELD BLUE, RED, SST, PST     COMMENT        Add-on orders for these samples will be processed based on acceptable specimen integrity and analyte stability, which may vary by analyte. GLUCOSE, POC    Collection Time: 04/25/23 12:11 PM   Result Value Ref Range    Glucose (POC) 129 (H) 65 - 117 mg/dL    Performed by Chaz Rosa RN    GLUCOSE, POC    Collection Time: 04/25/23  1:30 PM   Result Value Ref Range    Glucose (POC) 64 (L) 65 - 117 mg/dL    Performed by Chaz Rosa RN    GLUCOSE, POC    Collection Time: 04/25/23  1:46 PM   Result Value Ref Range    Glucose (POC) 41 (LL) 65 - 117 mg/dL    Performed by Chaz Rosa RN    SAMPLES BEING HELD    Collection Time: 04/25/23  1:47 PM   Result Value Ref Range    SAMPLES BEING HELD YELLOW     COMMENT        Add-on orders for these samples will be processed based on acceptable specimen integrity and analyte stability, which may vary by analyte. EKG as read by me shows      CT Results  (Last 48 hours)                 04/25/23 1307  CT ABD PELV WO CONT Final result    Impression:      1. No hydronephrosis or stone   2. No acute abnormality       Narrative:  EXAM: CT ABD PELV WO CONT       INDICATION: acute renal failure       COMPARISON: None       IV CONTRAST: None. ORAL CONTRAST: None       TECHNIQUE:    Thin axial images were obtained through the abdomen and pelvis. Coronal and   sagittal reformats were generated. CT dose reduction was achieved through use of   a standardized protocol tailored for this examination and automatic exposure   control for dose modulation. The absence of intravenous contrast material reduces the sensitivity for   evaluation of the vasculature and solid organs. FINDINGS:    LOWER THORAX: Heart is enlarged. There is basilar atelectasis   LIVER: No mass. BILIARY TREE: Small stones. No ductal dilatation CBD is not dilated. SPLEEN: within normal limits. PANCREAS: No focal abnormality. ADRENALS: Unremarkable. KIDNEYS/URETERS: No calculus or hydronephrosis.    STOMACH: Unremarkable. SMALL BOWEL: No dilatation or wall thickening. COLON: No dilatation or wall thickening. APPENDIX: Normal   PERITONEUM: No ascites or pneumoperitoneum. RETROPERITONEUM: No lymphadenopathy or aortic aneurysm. There are vascular   calcifications   REPRODUCTIVE ORGANS: T-shaped IUD within the uterus   URINARY BLADDER: No mass or calculus. BONES: No destructive bone lesion. ABDOMINAL WALL: No mass or hernia. ADDITIONAL COMMENTS: N/A                     CT ABD PELV WO CONT    Result Date: 4/25/2023  1. No hydronephrosis or stone 2. No acute abnormality   _______________________________________________________________________    TOTAL TIME:  70 Minutes    Critical Care Provided     Minutes non procedure based    Signed: Jaime Faye MD    Procedures: see electronic medical records for all procedures/Xrays and details which were not copied into this note but were reviewed prior to creation of Plan.

## 2023-04-25 NOTE — CONSULTS
Endocrinology Consult    Asked by Dr. Price Clark to see this patient for hypoglycemia with recent dka, hx of insulin pump. Lindsey Hinton is well known to me from my outpatient clinic where I have been her endocrinologist since the fall of 2009. She was supposed to see me this morning at 8:50 am for her regularly scheduled follow up visit and this was cancelled. I spoke with her father, ana maría Patel and he informed me that Lindsey Hinton had high sugars on Friday and Saturday but then on Sunday they started coming down and he didn't hear from her yesterday. When he went to her house today to pick her up for the visit, she slowly came to the door and appeared very drowsy and he checked her sugar and it was 43 and he gave her OJ and then a dose of glucagon and her sugar came up to 81 but then it proceeded to drop back down so he brought her to the ER for further evaluation. Her blood sugar dropped to 27 and she was found to have a creatinine of 4.1 when it had been 1.04 on 3/17/23 and he was unsure why her kidney function would have worsened. I came by the ER and saw Lindsey Hinton in person and she has been started on IVF with NS at 100 ml/hr and was started on D10 infusion at 40 ml/hr and given 2 boluses of D10 250 ml and her most recent sugar was up to 98 at 6:25pm.  She states she has not been started on any new meds and denies any recent use of NSAIDs. She has not felt well the past 24-48 hours and stated she thought she was going to die she felt so bad. She has not had any n/v/d. She does have an area on her upper buttock where she has some skin breakdown over the past month and this causes her some discomfort. She states she has been taking lantus 56 units daily per my direction on 4/12/23 and this was working to control her sugars so she doesn't know why they have been so erratic the past 2-3 days.     Assessment/Plan:  1) Type 1 diabetes uncontrolled with hypoglycemia: I am concerned that she likely has developed acute kidney injury due to possible dehydration after having higher sugars on Friday and Saturday and this led to her not clearing her insulin as much the past 24-48 hours and likely her lantus clearance has been prolonged leading to her severe hypoglycemia. I agree with using the D10 drip until her blood sugars have stabilized and then will restart a low dose of lantus once her sugars are over 180 to prevent her from going into DKA given she is type 1.  - cont D10 80 ml/hr  - cont accu-checks every 2 hours  - once her bs is > 180, stop the D10 drip and give one time dose of lantus but continue to check her sugars every 2 hours overnight  - follow creatinine on her metabolic panels    Thanks for the consult. Please don't hesitate to send me a message through perfect serve with any questions or concerns. I spent 55 minutes of face to face time on her case and > 50% of the time was spent reviewing the chart and coordinating her care with the nurse caring for her.       Ramo Cotter MD

## 2023-04-25 NOTE — ED NOTES
BGL 38, D10 RATE increased to 80. Attempting to reach provider,.  Pt is Alert, drowsy    1740 veral orders for additional d10 bolus, will re check in 1 hour

## 2023-04-25 NOTE — ED NOTES
Pt placed on bed alarm, was found attempting to get out of bed.  Oriented to self, place, disoriented to situation and time

## 2023-04-25 NOTE — ED PROVIDER NOTES
\[facility]\"" EMERGENCY DEPT  EMERGENCY DEPARTMENT ENCOUNTER       Pt Name: Lola Fletcher  MRN: [id number]  [place] 1970  Date of evaluation: 4/25/2023  Provider: Agnes Douglas MD   PCP: Adeola Schmidt MD  Note Started: 1:35 PM 4/25/23     CHIEF COMPLAINT       Chief Complaint   Patient presents with    Low Blood Sugar     Received IM glucagon after passing out at home. She is alert and oriented in triage         HISTORY OF PRESENT ILLNESS: 1 or more elements      History From: Patient  HPI Limitations : None     Lola Fletcher is a 47 y.o. female who presents present with complaints of low blood sugar while at home over the last 3 days. Patient was admitted about a month ago for diabetic acidosis to [facility]. Since that time, patient's been suffering from low blood sugars. Patient reported feeling weaker last 2 days, no other complaints. Father is present bedside. History limited to patient's altered mental status. Nursing Notes were all reviewed and agreed with or any disagreements were addressed in the HPI. REVIEW OF SYSTEMS      Review of Systems     Positives and Pertinent negatives as per HPI.     PAST HISTORY     Past Medical History:  Past Medical History:   Diagnosis Date    Achilles tendon rupture     along with torn right patellar/femoral ligament    Arthritis     rt. foot    Chronic kidney disease     Chronic pain     abdominal pain    Diabetes (HCC)     IDDM on insulin pump and sensor    DM type 1 [facility]di[facility]     age 25    Endometriosis     s/p ex-lap 4x    Gastric ulcer     GERD (gastroesophageal reflux disease)     Herpes     Other and unspecified hyperlipidemia     Panic attacks     Psychiatric disorder     anxiety, panic attacks    PTSD (post-traumatic stress disorder)     Unspecified essential hypertension        Past Surgical History:  Past Surgical History:   Procedure Laterality Date    HX COLONOSCOPY      HX GYN      2 d&c's    HX GYN      laparoscopies x5 for endometriosis    HX GYN      mirena in place    HX ORTHOPAEDIC  2002    achiles tendon repair,talus repair    HX ORTHOPAEDIC      injections x2 to right shoulder    HX ORTHOPAEDIC Left 02/07/2019    3rd trigger finger release    DC UNLISTED PROCEDURE CARDIAC SURGERY         Family History:  Family History   Problem Relation Age of Onset    Diabetes Mother         diet controlled    Diabetes Father         R foot amupation    Liver Disease Father     Heart Disease Paternal Uncle         MI in his 46s    Stroke Neg Hx        Social History:  Social History     Tobacco Use    Smoking status: Every Day     Packs/day: 0.50     Years: 28.00     Pack years: 14.00     Types: Cigarettes    Smokeless tobacco: Current    Tobacco comments:     Decreased to a half a pack    Substance Use Topics    Alcohol use: Not Currently     Comment: a beer every few months    Drug use: No     Types: OTC, Prescription       Allergies: Allergies   Allergen Reactions    Metolazone Other (comments)     Caused hyponatremia    Zocor [Simvastatin] Myalgia    Lisinopril Cough    Medrol [Methylprednisolone] Other (comments)     Caused severe hyperglycemia with the medrol pack       CURRENT MEDICATIONS      Previous Medications    ACCU-CHEK GUIDE TEST STRIPS STRIP    USE TO TEST UP TO 8 TIMES DAILY    ALBUTEROL (PROVENTIL HFA, VENTOLIN HFA, PROAIR HFA) 90 MCG/ACTUATION INHALER    Take 2 Puffs by inhalation every six (6) hours as needed for Shortness of Breath. ASPIRIN DELAYED-RELEASE 81 MG TABLET    Take 81 mg by mouth daily. BREXPIPRAZOLE (REXULTI) 0.5 MG TAB TABLET    Take 1 mg by mouth two (2) times a day. BUMETANIDE (BUMEX) 2 MG TABLET    Take 2 mg by mouth two (2) times a day. BUPROPION SR (WELLBUTRIN SR) 150 MG SR TABLET    Take 150 mg by mouth daily. BUSPIRONE (BUSPAR) 15 MG TABLET    Take 30 mg by mouth two (2) times a day.     BUTALBITAL-ACETAMINOPHEN-CAFFEINE (FIORICET, ESGIC) -40 MG PER TABLET    Take 1 Tablet by mouth every six (6) hours as needed for Headache. CITALOPRAM (CELEXA) 40 MG TABLET    Take 40 mg by mouth daily. DICLOFENAC (VOLTAREN) 1 % GEL    Apply 2 g to affected area four (4) times daily as needed for Pain. RT KNEE    FAMOTIDINE (PEPCID) 40 MG TABLET    Take 40 mg by mouth daily. FLUTICASONE PROPIONATE (FLONASE) 50 MCG/ACTUATION NASAL SPRAY    2 Sprays by Both Nostrils route daily as needed for Rhinitis. GABAPENTIN (NEURONTIN) 600 MG TABLET    Take 0.5 Tablets by mouth three (3) times daily. Max Daily Amount: 900 mg. Replaces the 300 mg caps    GLUCAGON (GLUCAGON EMERGENCY KIT, HUMAN,) 1 MG SOLR    USE AS DIRECTED IN KIT INSTRUCTIONS    INSULIN GLARGINE (LANTUS) 100 UNIT/ML INJECTION    56 Units by SubCUTAneous route daily. INSULIN LISPRO (HUMALOG) 100 UNIT/ML INJECTION    Inject 10 units before meals + 2 units for every 50 mg/dl above 150 mg/dl--max 65 units/day    INSULIN SYRINGE-NEEDLE U-100 0.5 ML 31 GAUGE X 5/16\" SYRG    Use as directed 4 times daily    INSULIN SYRINGE-NEEDLE U-100 1 ML 31 GAUGE X 5/16 SYRG    Use as directed 4 times daily    LEVONORGESTREL (MIRENA) 20 MCG/24 HOURS (7 YRS) 52 MG IUD    1 Device by IntraUTERine route once. METOCLOPRAMIDE HCL (REGLAN) 5 MG TABLET    Take 1 Tablet by mouth two (2) times a day. MIRTAZAPINE (REMERON) 15 MG TABLET    Take 15 mg by mouth daily. NALOXONE (NARCAN) 4 MG/ACTUATION NASAL SPRAY    Use 1 spray intranasally, then discard. Repeat with new spray every 2 min as needed for opioid overdose symptoms, alternating nostrils. NYSTATIN (MYCOSTATIN) POWDER    Apply  to affected area. OXYCODONE IR (ROXICODONE) 5 MG IMMEDIATE RELEASE TABLET    Take 5 mg by mouth every four (4) hours as needed for Pain. ROSUVASTATIN (CRESTOR) 40 MG TABLET    Take 40 mg by mouth daily.        PHYSICAL EXAM      ED Triage Vitals [04/25/23 1120]   ED Encounter Vitals Group      /70      Pulse (Heart Rate) 94      Resp Rate 20      Temp 98.1 °F (36.7 °C)      Temp src       O2 Sat (%) 96 %      Weight       Height         Physical Exam  Vitals and nursing note reviewed. Constitutional:       General: She is in acute distress. Appearance: She is well-developed. She is ill-appearing. HENT:      Head: Normocephalic and atraumatic. Mouth/Throat:      Pharynx: No oropharyngeal exudate. Eyes:      Conjunctiva/sclera: Conjunctivae normal.      Pupils: Pupils are equal, round, and reactive to light. Cardiovascular:      Rate and Rhythm: Normal rate and regular rhythm. Heart sounds: Normal heart sounds. No murmur heard. Pulmonary:      Effort: Pulmonary effort is normal. No tachypnea or accessory muscle usage. Breath sounds: Normal breath sounds. No wheezing or rales. Abdominal:      General: Bowel sounds are normal. There is no distension. Palpations: Abdomen is soft. Tenderness: There is no abdominal tenderness. Musculoskeletal:         General: No deformity. Normal range of motion. Cervical back: Normal range of motion and neck supple. Skin:     General: Skin is warm. Findings: No rash. Neurological:      Mental Status: She is lethargic. Cranial Nerves: No facial asymmetry. Sensory: Sensation is intact. No sensory deficit. Motor: Motor function is intact. Coordination: Coordination normal.      Comments: Symmetric smile. No facial droop.    Psychiatric:         Behavior: Behavior normal.          DIAGNOSTIC RESULTS   LABS:     Recent Results (from the past 12 hour(s))   GLUCOSE, POC    Collection Time: 04/25/23 11:22 AM   Result Value Ref Range    Glucose (POC) 35 (LL) 65 - 117 mg/dL    Performed by Aivo 15, POC    Collection Time: 04/25/23 11:24 AM   Result Value Ref Range    Glucose (POC) 36 (LL) 65 - 117 mg/dL    Performed by [place] 15, POC    Collection Time: 04/25/23 11:40 AM   Result Value Ref Range    Glucose (POC) 27 (LL) 65 - 117 mg/dL Performed by Byron Barbosa RN    BLOOD GAS,CHEM8,LACTIC ACID POC    Collection Time: 04/25/23 11:55 AM   Result Value Ref Range    pH, venous (POC) 7.35 7.32 - 7.42      pCO2, venous (POC) 55.8 (H) 41 - 51 MMHG    pO2, venous (POC) 19 (L) 25 - 40 mmHg    BICARBONATE 31 mmol/L    Base excess (POC) 4.3 mmol/L    O2 SAT 25 %    Sodium,  (L) 136 - 145 MMOL/L    Potassium, POC 4.1 3.5 - 5.5 MMOL/L    Chloride, POC 91 (L) 100 - 108 MMOL/L    CO2, POC 30 (H) 19 - 24 MMOL/L    Anion gap, POC 9 (L) 10 - 20      Glucose, POC 28 (LL) 74 - 106 MG/DL    Creatinine, POC 4.1 (H) 0.6 - 1.3 MG/DL    eGFR (POC) 12 (L) >60 ml/min/1.73m2    Calcium, ionized (POC) 1.07 (L) 1.12 - 1.32 mmol/L    Lactic Acid (POC) 1.11 0.40 - 2.00 mmol/L    Sample source VENOUS BLOOD      Critical value read back [place]    CBC WITH AUTOMATED DIFF    Collection Time: 04/25/23 12:00 PM   Result Value Ref Range    WBC 8.9 3.6 - 11.0 K/uL    RBC 4.07 3.80 - 5.20 M/uL    HGB 9.9 (L) 11.5 - 16.0 g/dL    HCT 31.6 (L) 35.0 - 47.0 %    MCV 77.6 (L) 80.0 - 99.0 FL    MCH 24.3 (L) 26.0 - 34.0 PG    MCHC 31.3 30.0 - 36.5 g/dL    RDW 18.5 (H) 11.5 - 14.5 %    PLATELET 319 795 - 944 K/uL    MPV 10.0 8.9 - 12.9 FL    NRBC 0.0 0  WBC    ABSOLUTE NRBC 0.00 0.00 - 0.01 K/uL    NEUTROPHILS 70 32 - 75 %    LYMPHOCYTES 20 12 - 49 %    MONOCYTES 9 5 - 13 %    EOSINOPHILS 1 0 - 7 %    BASOPHILS 0 0 - 1 %    IMMATURE GRANULOCYTES 0 0.0 - 0.5 %    ABS. NEUTROPHILS 6.3 1.8 - 8.0 K/UL    ABS. LYMPHOCYTES 1.7 0.8 - 3.5 K/UL    ABS. MONOCYTES 0.8 0.0 - 1.0 K/UL    ABS. EOSINOPHILS 0.1 0.0 - 0.4 K/UL    ABS. BASOPHILS 0.0 0.0 - 0.1 K/UL    ABS. IMM.  GRANS. 0.0 0.00 - 0.04 K/UL    DF AUTOMATED     METABOLIC PANEL, COMPREHENSIVE    Collection Time: 04/25/23 12:00 PM   Result Value Ref Range    Sodium 129 (L) 136 - 145 mmol/L    Potassium 4.0 3.5 - 5.1 mmol/L    Chloride 92 (L) 97 - 108 mmol/L    CO2 29 21 - 32 mmol/L    Anion gap 8 5 - 15 mmol/L    Glucose 33 (LL) 65 - 100 mg/dL    BUN 81 (H) 6 - 20 MG/DL    Creatinine 4.12 (H) 0.55 - 1.02 MG/DL    BUN/Creatinine ratio 20 12 - 20      eGFR 12 (L) >60 ml/min/1.73m2    Calcium 8.5 8.5 - 10.1 MG/DL    Bilirubin, total 0.8 0.2 - 1.0 MG/DL    ALT (SGPT) 162 (H) 12 - 78 U/L    AST (SGOT) 424 (H) 15 - 37 U/L    Alk. phosphatase 115 45 - 117 U/L    Protein, total 7.5 6.4 - 8.2 g/dL    Albumin 3.3 (L) 3.5 - 5.0 g/dL    Globulin 4.2 (H) 2.0 - 4.0 g/dL    A-G Ratio 0.8 (L) 1.1 - 2.2     SAMPLES BEING HELD    Collection Time: 04/25/23 12:00 PM   Result Value Ref Range    SAMPLES BEING HELD BLUE, RED, SST, PST     COMMENT        Add-on orders for these samples will be processed based on acceptable specimen integrity and analyte stability, which may vary by analyte. GLUCOSE, POC    Collection Time: 04/25/23 12:11 PM   Result Value Ref Range    Glucose (POC) 129 (H) 65 - 117 mg/dL    Performed by Brendon Valdovinos RN         EKG: Cardiac monitor: Interpreted by me, normal sinus rhythm, no acute ST elevations or depressions, no ectopy     RADIOLOGY:  Non-plain film images such as CT, Ultrasound and MRI are read by the radiologist. Plain radiographic images are visualized and preliminarily interpreted by the ED Provider with the below findings:          Interpretation per the Radiologist below, if available at the time of this note:     CT ABD PELV WO CONT    Result Date: 4/25/2023  EXAM: CT ABD PELV WO CONT INDICATION: acute renal failure COMPARISON: None IV CONTRAST: None. ORAL CONTRAST: None TECHNIQUE: Thin axial images were obtained through the abdomen and pelvis. Coronal and sagittal reformats were generated. CT dose reduction was achieved through use of a standardized protocol tailored for this examination and automatic exposure control for dose modulation. The absence of intravenous contrast material reduces the sensitivity for evaluation of the vasculature and solid organs. FINDINGS: LOWER THORAX: Heart is enlarged.  There is basilar atelectasis LIVER: No mass. BILIARY TREE: Small stones. No ductal dilatation CBD is not dilated. SPLEEN: within normal limits. PANCREAS: No focal abnormality. ADRENALS: Unremarkable. KIDNEYS/URETERS: No calculus or hydronephrosis. STOMACH: Unremarkable. SMALL BOWEL: No dilatation or wall thickening. COLON: No dilatation or wall thickening. APPENDIX: Normal PERITONEUM: No ascites or pneumoperitoneum. RETROPERITONEUM: No lymphadenopathy or aortic aneurysm. There are vascular calcifications REPRODUCTIVE ORGANS: T-shaped IUD within the uterus URINARY BLADDER: No mass or calculus. BONES: No destructive bone lesion. ABDOMINAL WALL: No mass or hernia. ADDITIONAL COMMENTS: N/A     1. No hydronephrosis or stone 2.  No acute abnormality       PROCEDURES   Unless otherwise noted below, none  Critical Care  Performed by: Salud Lu MD  Authorized by: Salud Lu MD     Critical care provider statement:     Critical care time (minutes):  40    Critical care time was exclusive of:  Separately billable procedures and treating other patients and teaching time    Critical care was necessary to treat or prevent imminent or life-threatening deterioration of the following conditions:  Renal failure, dehydration and metabolic crisis    Critical care was time spent personally by me on the following activities:  Development of treatment plan with patient or surrogate, review of old charts, pulse oximetry, re-evaluation of patient's condition, ordering and performing treatments and interventions, ordering and review of laboratory studies, ordering and review of radiographic studies, evaluation of patient's response to treatment and examination of patient    Care discussed with: admitting provider       CRITICAL CARE TIME   40      SCREENINGS             No data recorded         EMERGENCY DEPARTMENT COURSE and DIFFERENTIAL DIAGNOSIS/MDM   Vitals:    Vitals:    04/25/23 1120   BP: 114/70   Pulse: 94   Resp: 20   Temp: 98.1 °F (36.7 °C)   SpO2: 96%        Patient was given the following medications:  Medications   sodium chloride 0.9 % bolus infusion 1,000 mL (has no administration in time range)   acetaminophen (TYLENOL) tablet 650 mg (has no administration in time range)   ondansetron (ZOFRAN) injection 4 mg (has no administration in time range)   dextrose 10% 10 % infusion (250 mL  New Bag 4/25/23 1141)       CONSULTS: (Who and What was discussed)  IP CONSULT TO HOSPITALIST    Chronic Conditions:   Past Medical History:   Diagnosis Date    Achilles tendon rupture     along with torn right patellar/femoral ligament    Arthritis     rt. foot    Chronic kidney disease     Chronic pain     abdominal pain    Diabetes (HCC)     IDDM on insulin pump and sensor    DM type 1 (diabetes mellitus, type 1) ([facility]     age 25    Endometriosis     s/p ex-lap 4x    Gastric ulcer     GERD (gastroesophageal reflux disease)     Herpes     Other and unspecified hyperlipidemia     Panic attacks     Psychiatric disorder     anxiety, panic attacks    PTSD (post-traumatic stress disorder)     Unspecified essential hypertension          Social Determinants affecting Dx or Tx: None    Records Reviewed (source and summary of external notes): Prior medical records and Nursing notes    CC/HPI Summary, DDx, ED Course, and Reassessment: Patient is a 52-year-old female present with symptoms of hypoglycemia since discharge from the hospital disease. Patient's insulin for diabetic, he was unable to give 28, and he was unable to replace it. Patient placed on D10 infusion, serial every hour blood sugar checks were ordered. Patient was instantly found to be in acute renal failure. We will place Rnakin catheter to monitor urine output and rule out post renal colic patient's renal failure. Perform CT scan abdomen pelvis without contrast which showed no evidence of kidney stone, or any other abnormalities. We will place a consult for nephrology.   We will start IV fluids. Admit for further work-up and management         Disposition Considerations (Tests not done, Shared Decision Making, Pt Expectation of Test or Tx.):      FINAL IMPRESSION     1. Acute renal failure, unspecified acute renal failure type ([facility] [address]    2. Hypoglycemia          DISPOSITION/PLAN   Admitted    Admit Note: Pt is being admitted by hospitalist. The results of their tests and reason(s) for their admission have been discussed with pt and/or available family. They convey agreement and understanding for the need to be admitted and for the admission diagnosis. I am the Primary Clinician of Record. Marimar Ahn MD (electronically signed)    (Please note that parts of this dictation were completed with voice recognition software. Quite often unanticipated grammatical, syntax, homophones, and other interpretive errors are inadvertently transcribed by the computer software. Please disregards these errors.  Please excuse any errors that have escaped final proofreading.)

## 2023-04-26 LAB
ALBUMIN SERPL-MCNC: 2.6 G/DL (ref 3.5–5)
ALBUMIN/GLOB SERPL: 0.7 (ref 1.1–2.2)
ALP SERPL-CCNC: 90 U/L (ref 45–117)
ALT SERPL-CCNC: 120 U/L (ref 12–78)
ANION GAP SERPL CALC-SCNC: 8 MMOL/L (ref 5–15)
AST SERPL-CCNC: 287 U/L (ref 15–37)
BILIRUB SERPL-MCNC: 0.4 MG/DL (ref 0.2–1)
BUN SERPL-MCNC: 64 MG/DL (ref 6–20)
BUN/CREAT SERPL: 24 (ref 12–20)
CALCIUM SERPL-MCNC: 7.6 MG/DL (ref 8.5–10.1)
CHLORIDE SERPL-SCNC: 96 MMOL/L (ref 97–108)
CO2 SERPL-SCNC: 27 MMOL/L (ref 21–32)
CREAT SERPL-MCNC: 2.66 MG/DL (ref 0.55–1.02)
ERYTHROCYTE [DISTWIDTH] IN BLOOD BY AUTOMATED COUNT: 18.7 % (ref 11.5–14.5)
GLOBULIN SER CALC-MCNC: 3.6 G/DL (ref 2–4)
GLUCOSE BLD STRIP.AUTO-MCNC: 123 MG/DL (ref 65–117)
GLUCOSE BLD STRIP.AUTO-MCNC: 156 MG/DL (ref 65–117)
GLUCOSE BLD STRIP.AUTO-MCNC: 169 MG/DL (ref 65–117)
GLUCOSE BLD STRIP.AUTO-MCNC: 174 MG/DL (ref 65–117)
GLUCOSE BLD STRIP.AUTO-MCNC: 181 MG/DL (ref 65–117)
GLUCOSE BLD STRIP.AUTO-MCNC: 200 MG/DL (ref 65–117)
GLUCOSE BLD STRIP.AUTO-MCNC: 213 MG/DL (ref 65–117)
GLUCOSE BLD STRIP.AUTO-MCNC: 224 MG/DL (ref 65–117)
GLUCOSE BLD STRIP.AUTO-MCNC: 43 MG/DL (ref 65–117)
GLUCOSE BLD STRIP.AUTO-MCNC: 47 MG/DL (ref 65–117)
GLUCOSE BLD STRIP.AUTO-MCNC: 55 MG/DL (ref 65–117)
GLUCOSE BLD STRIP.AUTO-MCNC: 85 MG/DL (ref 65–117)
GLUCOSE BLD STRIP.AUTO-MCNC: 90 MG/DL (ref 65–117)
GLUCOSE SERPL-MCNC: 93 MG/DL (ref 65–100)
HAV IGM SER QL: NONREACTIVE
HBV CORE IGM SER QL: NONREACTIVE
HBV SURFACE AG SER QL: 0.52 INDEX
HBV SURFACE AG SER QL: NEGATIVE
HCT VFR BLD AUTO: 28.2 % (ref 35–47)
HCV AB SERPL QL IA: NONREACTIVE
HGB BLD-MCNC: 8.8 G/DL (ref 11.5–16)
MCH RBC QN AUTO: 24.5 PG (ref 26–34)
MCHC RBC AUTO-ENTMCNC: 31.2 G/DL (ref 30–36.5)
MCV RBC AUTO: 78.6 FL (ref 80–99)
NRBC # BLD: 0 K/UL (ref 0–0.01)
NRBC BLD-RTO: 0 PER 100 WBC
PLATELET # BLD AUTO: 242 K/UL (ref 150–400)
PMV BLD AUTO: 9.4 FL (ref 8.9–12.9)
POTASSIUM SERPL-SCNC: 4 MMOL/L (ref 3.5–5.1)
PROT SERPL-MCNC: 6.2 G/DL (ref 6.4–8.2)
RBC # BLD AUTO: 3.59 M/UL (ref 3.8–5.2)
SERVICE CMNT-IMP: ABNORMAL
SERVICE CMNT-IMP: NORMAL
SERVICE CMNT-IMP: NORMAL
SODIUM SERPL-SCNC: 131 MMOL/L (ref 136–145)
SP1: NORMAL
SP2: NORMAL
SP3: NORMAL
WBC # BLD AUTO: 9.2 K/UL (ref 3.6–11)

## 2023-04-26 PROCEDURE — 87040 BLOOD CULTURE FOR BACTERIA: CPT

## 2023-04-26 PROCEDURE — 94760 N-INVAS EAR/PLS OXIMETRY 1: CPT

## 2023-04-26 PROCEDURE — 80074 ACUTE HEPATITIS PANEL: CPT

## 2023-04-26 PROCEDURE — 65660000001 HC RM ICU INTERMED STEPDOWN

## 2023-04-26 PROCEDURE — 74011000258 HC RX REV CODE- 258: Performed by: PHYSICIAN ASSISTANT

## 2023-04-26 PROCEDURE — 74011000250 HC RX REV CODE- 250: Performed by: PHYSICIAN ASSISTANT

## 2023-04-26 PROCEDURE — 82962 GLUCOSE BLOOD TEST: CPT

## 2023-04-26 PROCEDURE — 74011250637 HC RX REV CODE- 250/637: Performed by: INTERNAL MEDICINE

## 2023-04-26 PROCEDURE — 80053 COMPREHEN METABOLIC PANEL: CPT

## 2023-04-26 PROCEDURE — 74011000250 HC RX REV CODE- 250: Performed by: INTERNAL MEDICINE

## 2023-04-26 PROCEDURE — 74011000258 HC RX REV CODE- 258: Performed by: INTERNAL MEDICINE

## 2023-04-26 PROCEDURE — 85027 COMPLETE CBC AUTOMATED: CPT

## 2023-04-26 PROCEDURE — 74011250636 HC RX REV CODE- 250/636: Performed by: INTERNAL MEDICINE

## 2023-04-26 PROCEDURE — 36415 COLL VENOUS BLD VENIPUNCTURE: CPT

## 2023-04-26 PROCEDURE — 77010033678 HC OXYGEN DAILY

## 2023-04-26 PROCEDURE — 74011250637 HC RX REV CODE- 250/637: Performed by: PHYSICIAN ASSISTANT

## 2023-04-26 PROCEDURE — 74011636637 HC RX REV CODE- 636/637: Performed by: INTERNAL MEDICINE

## 2023-04-26 RX ORDER — IBUPROFEN 200 MG
4 TABLET ORAL AS NEEDED
Status: DISCONTINUED | OUTPATIENT
Start: 2023-04-26 | End: 2023-04-28 | Stop reason: HOSPADM

## 2023-04-26 RX ORDER — BUPROPION HYDROCHLORIDE 150 MG/1
150 TABLET ORAL DAILY
COMMUNITY

## 2023-04-26 RX ORDER — SPIRONOLACTONE 25 MG/1
25 TABLET ORAL DAILY
COMMUNITY
End: 2023-04-28

## 2023-04-26 RX ORDER — DEXTROSE MONOHYDRATE 100 MG/ML
0-250 INJECTION, SOLUTION INTRAVENOUS AS NEEDED
Status: DISCONTINUED | OUTPATIENT
Start: 2023-04-26 | End: 2023-04-28 | Stop reason: HOSPADM

## 2023-04-26 RX ORDER — INSULIN LISPRO 100 [IU]/ML
INJECTION, SOLUTION INTRAVENOUS; SUBCUTANEOUS
Status: DISCONTINUED | OUTPATIENT
Start: 2023-04-26 | End: 2023-04-28 | Stop reason: HOSPADM

## 2023-04-26 RX ORDER — ESOMEPRAZOLE MAGNESIUM 40 MG/1
40 CAPSULE, DELAYED RELEASE ORAL 2 TIMES DAILY
COMMUNITY

## 2023-04-26 RX ORDER — DEXTROSE MONOHYDRATE AND SODIUM CHLORIDE 5; .9 G/100ML; G/100ML
100 INJECTION, SOLUTION INTRAVENOUS CONTINUOUS
Status: DISCONTINUED | OUTPATIENT
Start: 2023-04-26 | End: 2023-04-27

## 2023-04-26 RX ORDER — INSULIN GLARGINE 100 [IU]/ML
10 INJECTION, SOLUTION SUBCUTANEOUS ONCE
Status: COMPLETED | OUTPATIENT
Start: 2023-04-26 | End: 2023-04-26

## 2023-04-26 RX ADMIN — BUPROPION HYDROCHLORIDE 150 MG: 150 TABLET, EXTENDED RELEASE ORAL at 09:14

## 2023-04-26 RX ADMIN — SODIUM CHLORIDE, PRESERVATIVE FREE 10 ML: 5 INJECTION INTRAVENOUS at 15:42

## 2023-04-26 RX ADMIN — ASPIRIN 81 MG: 81 TABLET, COATED ORAL at 08:26

## 2023-04-26 RX ADMIN — MIRTAZAPINE 15 MG: 15 TABLET, FILM COATED ORAL at 08:27

## 2023-04-26 RX ADMIN — DEXTROSE MONOHYDRATE 100 ML/HR: 100 INJECTION, SOLUTION INTRAVENOUS at 00:38

## 2023-04-26 RX ADMIN — Medication 2 UNITS: at 17:09

## 2023-04-26 RX ADMIN — HEPARIN SODIUM 5000 UNITS: 5000 INJECTION INTRAVENOUS; SUBCUTANEOUS at 15:40

## 2023-04-26 RX ADMIN — FAMOTIDINE 20 MG: 20 TABLET, FILM COATED ORAL at 08:27

## 2023-04-26 RX ADMIN — SODIUM CHLORIDE, PRESERVATIVE FREE 10 ML: 5 INJECTION INTRAVENOUS at 05:34

## 2023-04-26 RX ADMIN — HEPARIN SODIUM 5000 UNITS: 5000 INJECTION INTRAVENOUS; SUBCUTANEOUS at 21:47

## 2023-04-26 RX ADMIN — DEXTROSE AND SODIUM CHLORIDE 100 ML/HR: 5; 900 INJECTION, SOLUTION INTRAVENOUS at 05:30

## 2023-04-26 RX ADMIN — DEXTROSE AND SODIUM CHLORIDE 100 ML/HR: 5; 900 INJECTION, SOLUTION INTRAVENOUS at 15:35

## 2023-04-26 RX ADMIN — CEFTRIAXONE 1 G: 1 INJECTION, POWDER, FOR SOLUTION INTRAMUSCULAR; INTRAVENOUS at 15:40

## 2023-04-26 RX ADMIN — BREXPIPRAZOLE 1 MG: 1 TABLET ORAL at 09:14

## 2023-04-26 RX ADMIN — CITALOPRAM HYDROBROMIDE 40 MG: 20 TABLET ORAL at 08:27

## 2023-04-26 RX ADMIN — SODIUM CHLORIDE, PRESERVATIVE FREE 10 ML: 5 INJECTION INTRAVENOUS at 21:48

## 2023-04-26 RX ADMIN — Medication 2 UNITS: at 12:18

## 2023-04-26 RX ADMIN — Medication 2 UNITS: at 21:47

## 2023-04-26 RX ADMIN — BUSPIRONE HYDROCHLORIDE 30 MG: 10 TABLET ORAL at 08:26

## 2023-04-26 RX ADMIN — INSULIN GLARGINE 10 UNITS: 100 INJECTION, SOLUTION SUBCUTANEOUS at 21:47

## 2023-04-26 RX ADMIN — BREXPIPRAZOLE 1 MG: 1 TABLET ORAL at 17:19

## 2023-04-26 RX ADMIN — BUSPIRONE HYDROCHLORIDE 30 MG: 10 TABLET ORAL at 17:09

## 2023-04-26 RX ADMIN — HYDROXYZINE HYDROCHLORIDE 25 MG: 10 TABLET ORAL at 21:46

## 2023-04-26 RX ADMIN — HEPARIN SODIUM 5000 UNITS: 5000 INJECTION INTRAVENOUS; SUBCUTANEOUS at 05:34

## 2023-04-26 RX ADMIN — DEXTROSE MONOHYDRATE 250 ML: 100 INJECTION, SOLUTION INTRAVENOUS at 00:51

## 2023-04-26 RX ADMIN — METOCLOPRAMIDE 5 MG: 10 TABLET ORAL at 17:08

## 2023-04-26 RX ADMIN — INSULIN GLARGINE 10 UNITS: 100 INJECTION, SOLUTION SUBCUTANEOUS at 09:42

## 2023-04-26 RX ADMIN — METOCLOPRAMIDE 5 MG: 10 TABLET ORAL at 08:32

## 2023-04-26 NOTE — PROGRESS NOTES
Called and spoke with Asael Gilbert patients boyfriend per patients request. Informed him that patient has been moved to room [phone] for closer monitoring. Asael Gilbert had no further questions.

## 2023-04-26 NOTE — PROGRESS NOTES
Problem: Falls - Risk of  Goal: *Absence of Falls  Description: Document Saulo Carpio Fall Risk and appropriate interventions in the flowsheet.   Outcome: Progressing Towards Goal  Note: Fall Risk Interventions:                                Problem: Non-Violent Restraints  Goal: Removal from restraints as soon as assessed to be safe  Outcome: Progressing Towards Goal  Goal: No harm/injury to patient while restraints in use  Outcome: Progressing Towards Goal  Goal: Patient's dignity will be maintained  Outcome: Progressing Towards Goal  Goal: Patient Interventions  Outcome: Progressing Towards Goal

## 2023-04-26 NOTE — PROGRESS NOTES
2038:  Nurse removed restraints to perform ROM. Patient attempted to pull at chew. 2148: Nurse called MRI and confirmed screening form was received. 5693: Restraints taken off, patient easy to re orient with tele sitter. Problem: Falls - Risk of  Goal: *Absence of Falls  Description: Document Magdiel Ferguson Fall Risk and appropriate interventions in the flowsheet.   Outcome: Not Progressing Towards Goal  Note: Fall Risk Interventions:        Monitor, bed alarm       Problem: Patient Education: Go to Patient Education Activity  Goal: Patient/Family Education  Outcome: Not Progressing Towards Goal     Problem: Non-Violent Restraints  Goal: Removal from restraints as soon as assessed to be safe  Outcome: Not Progressing Towards Goal  Goal: No harm/injury to patient while restraints in use  Outcome: Not Progressing Towards Goal  Goal: Patient's dignity will be maintained  Outcome: Not Progressing Towards Goal  Goal: Patient Interventions  Outcome: Not Progressing Towards Goal

## 2023-04-26 NOTE — PROGRESS NOTES
Hospitalist Progress Note    NAME: Loal Fletcher   :  1970   MRN:  [id number]       Assessment / Plan:    Estimated discharge date:   Barriers: Improvement of blood sugar and also creatinine     Hypoglycemia  Diabetes mellitus uncontrolled  -Patient is coming in with blood sugars in the low 30s at home  -Recent A1c in January was 10.9  -Received multiple doses of dextrose in the emergency department but still hypoglycemic so was started D10 drip initially then it was stopped as per endocrinology recommendations  -Endocrinology input is highly appreciated  - Was given 10 units of Lantus this morning as per endocrinology recommendations  - Blood sugar checks every 2 hours  -Continue feeding as possible     Acute kidney injury  -Baseline creatinine is around 1.5-2 and on admission creatinine is 4.1  -UA with bacteriuria and pyuria  -not on nephrotoxic medications at home  -Continue IV fluid support  - Creatinine is improving to 2.66 now  -Monitor urine output and consider Rankin  = CT abdomen with no hydronephrosis    Acute urinary tract infection  - UA with bacteriuria and pyuria  - Culture sent  - Start ceftriaxone     Acute transaminitis  -ALT and AST are both elevated 162 on 424. Total bilirubin is within normal limits  -Patient's father does not report any frequent alcohol use  -Check PT/INR  -CT abdomen reviewed  - Transaminitis trending down     Acute metabolic encephalopathy likely related to hypoglycemia  -She continues to be alert and awake but mildly confused  -Exam is nonfocal and is able to move all 4 extremities, pupils are reactive bilaterally  -CT head with no acute abnormality reported  -We will obtain MRI to rule out CVA as patient continued to be confused     Mild hyponatremia  -Improving           Congestive heart failure  Chronic respiratory failure due to COPD on 3 L  Depression  PTSD  GERD  Radiculopathy  -Continue home Rexulti, Wellbutrin, BuSpar, Celexa.   Mental status is worsening, we may have to hold some of her psych medications.  -Continue home famotidine  -Continue home regimen Remeron                               Medical Decision Making:   I personally reviewed labs: CBC, CMP, ultrasound renal  I personally reviewed imaging: CT abdomen, ultrasound renal  Toxic drug monitoring: Monitor blood sugars  Discussed case with: ED provider. After discussion I am in agreement that acuity of patient's medical condition necessitates hospital stay. Code Status: Full code  DVT Prophylaxis: Lovenox  GI Prophylaxis: PT famotidine  Baseline: From home, lives with wife and    30.0 - 39.9 Obese / Body mass index is 36.9 kg/m². Estimated discharge date: April 28  Barriers: Clinical improvement     Subjective:     Patient was seen and examined. No acute events overnight. Discussed with RN overnight events. All patient's questions were answered. \"I am very tired\"      Review of Systems:  Symptom Y/N Comments  Symptom Y/N Comments   Fever/Chills n   Chest Pain n    Poor Appetite    Edema     Cough n   Abdominal Pain n    Sputum    Joint Pain     SOB/HERNÁNDEZ n   Pruritis/Rash     Nausea/vomit n   Tolerating PT/OT     Diarrhea    Tolerating Diet y    Constipation    Other       Could NOT obtain due to: Patient will be confused         Objective:     VITALS:   Last 24hrs VS reviewed since prior progress note.  Most recent are:  Patient Vitals for the past 24 hrs:   Temp Pulse Resp BP SpO2   04/26/23 1200 -- 94 -- -- --   04/26/23 1048 97.6 °F (36.4 °C) 83 21 105/62 92 %   04/26/23 0839 -- 91 18 -- 98 %   04/26/23 0838 -- 90 22 -- 98 %   04/26/23 0837 -- 89 17 -- 97 %   04/26/23 0836 -- 91 19 -- 97 %   04/26/23 0835 -- 94 21 -- 92 %   04/26/23 0834 -- (!) 103 18 -- 91 %   04/26/23 0833 -- 99 20 -- 91 %   04/26/23 0832 -- (!) 103 25 -- (!) 84 %   04/26/23 0830 -- 100 22 -- (!) 88 %   04/26/23 0800 -- 90 -- -- --   04/26/23 0737 98.3 °F (36.8 °C) 97 20 109/61 90 %   04/26/23 0642 -- 99 15 -- --   04/26/23 0641 98.3 °F (36.8 °C) 99 15 119/63 97 %   04/26/23 0400 -- (!) 104 -- -- --   04/26/23 0224 99 °F (37.2 °C) 94 18 118/69 97 %   04/26/23 0151 99.3 °F (37.4 °C) (!) 101 -- 112/68 96 %   04/25/23 2357 -- 92 -- -- --   04/25/23 2000 -- 87 -- -- --   04/25/23 1915 98.4 °F (36.9 °C) 93 18 118/70 100 %   04/25/23 1808 -- 89 11 (!) 105/58 97 %   04/25/23 1738 -- 87 12 116/63 --   04/25/23 1723 -- 92 21 111/71 --   04/25/23 1708 -- 94 19 129/69 --   04/25/23 1630 98.2 °F (36.8 °C) 91 15 103/70 98 %   04/25/23 1619 -- 82 15 108/65 --   04/25/23 1609 -- 84 17 117/63 --   04/25/23 1510 -- 80 12 98/72 --       Intake/Output Summary (Last 24 hours) at 4/26/2023 1503  Last data filed at 4/26/2023 0830  Gross per 24 hour   Intake 120 ml   Output 3450 ml   Net -3330 ml        I had a face to face encounter and independently examined this patient on 4/26/2023, as outlined below:  PHYSICAL EXAM:  General: WD, WN. Alert, cooperative, no acute distress    EENT:  EOMI. Anicteric sclerae. MMM  Resp:  CTA bilaterally, no wheezing or rales. No accessory muscle use  CV:  Regular  rhythm,  No edema  GI:  Soft, Non distended, Non tender. +Bowel sounds  Neurologic:  EOMs intact. No facial asymmetry. No aphasia or slurred speech. Symmetrical strength, Sensation grossly intact. Alert but confused when I asked her about orientation questions    Psych:   Good insight. Not anxious nor agitated  Skin:  No rashes.   No jaundice    Reviewed most current lab test results and cultures  YES  Reviewed most current radiology test results   YES  Review and summation of old records today    NO  Reviewed patient's current orders and MAR    YES  PMH/ reviewed - no change compared to H&P  ________________________________________________________________________  Care Plan discussed with:    Comments   Patient X    Family      RN X    Care Manager X    Consultant                       X Multidiciplinary team rounds were held today with case manager, nursing, pharmacist and clinical coordinator. Patient's plan of care was discussed; medications were reviewed and discharge planning was addressed. ________________________________________________________________________        Comments   >50% of visit spent in counseling and coordination of care X    ________________________________________________________________________  Ruby Coronel MD     Procedures: see electronic medical records for all procedures/Xrays and details which were not copied into this note but were reviewed prior to creation of Plan. LABS:  I reviewed today's most current labs and imaging studies.   Pertinent labs include:  Recent Labs     04/26/23  0100 04/25/23  1200   WBC 9.2 8.9   HGB 8.8* 9.9*   HCT 28.2* 31.6*    319     Recent Labs     04/26/23 0100 04/25/23  1200   * 129*   K 4.0 4.0   CL 96* 92*   CO2 27 29   GLU 93 33*   BUN 64* 81*   CREA 2.66* 4.12*   CA 7.6* 8.5   ALB 2.6* 3.3*   TBILI 0.4 0.8   * 162*       Signed: Ruby Coronel MD

## 2023-04-26 NOTE — PROGRESS NOTES
Problem: Falls - Risk of  Goal: *Absence of Falls  Description: Document Saulo Shirin Fall Risk and appropriate interventions in the flowsheet.   Outcome: Progressing Towards Goal  Note: Fall Risk Interventions:                                Problem: Patient Education: Go to Patient Education Activity  Goal: Patient/Family Education  Outcome: Progressing Towards Goal     Problem: Non-Violent Restraints  Goal: Removal from restraints as soon as assessed to be safe  Outcome: Progressing Towards Goal  Goal: No harm/injury to patient while restraints in use  Outcome: Progressing Towards Goal  Goal: Patient's dignity will be maintained  Outcome: Progressing Towards Goal  Goal: Patient Interventions  Outcome: Progressing Towards Goal

## 2023-04-26 NOTE — PROGRESS NOTES
0730: Bedside shift change report given to [address] [facility] [address], RN (oncoming nurse) by Aury Jung RN (offgoing nurse). Report included the following information SBAR and Kardex. 1900: End of Shift Note    Bedside shift change report given to RN (oncoming nurse) by Bonnie Perez RN (offgoing nurse). Report included the following information SBAR, Kardex, and MAR    Shift worked:  7a-7p     Shift summary and any significant changes:     Please see chart and aforementioned notes     Concerns for physician to address:  None at this time     Zone phone for oncoming shift:          Activity:  Activity Level: Bed Rest  Number times ambulated in hallways past shift: 0  Number of times OOB to chair past shift: 0    Cardiac:   Cardiac Monitoring: Yes      Cardiac Rhythm: Sinus Rhythm    Access:  Current line(s): PIV     Genitourinary:   Urinary status: chew    Respiratory:   O2 Device: Nasal cannula  Chronic home O2 use?: YES  Incentive spirometer at bedside: NO       GI:  Last Bowel Movement Date: 04/24/23  Current diet:  ADULT DIET Regular; 3 carb choices (45 gm/meal)  ADULT ORAL NUTRITION SUPPLEMENT Breakfast, Dinner;  Low Calorie/High Protein  Passing flatus: YES  Tolerating current diet: YES       Pain Management:   Patient states pain is manageable on current regimen: YES    Skin:  Joey Score: 15  Interventions: increase time out of bed and PT/OT consult    Patient Safety:  Fall Score:    Interventions: bed/chair alarm, assistive device (walker, cane, etc), gripper socks, and pt to call before getting OOB       Length of Stay:  Expected LOS: 2d 21h  Actual LOS: [address], RN

## 2023-04-26 NOTE — WOUND CARE
Wound care consult for the Buttocks skin \"burn\" from incontinence. Pt. Is 47years old and she was admitted for Hypoglycemia. She has uncontrolled diabetes. Pt. Stated that she was \"sitting in her own poop for hours\" most days. Skin stayed wet and   Buttocks / incontinence skin burns:         Assessment: the skin is red, painful and weeping. Treatment Recommended:  Desitin cream - apply to clean, dry skin.  Apply thickly until this starts to heal.   Gordon Quiroz, RN, BSN, [facility]

## 2023-04-26 NOTE — PROGRESS NOTES
Transition of Care Plan:    RUR: 20% \"high risk\"  Disposition: Home with follow up appts  If SNF or IPR: Date FOC offered: M/A  Date FOC received: N/A  Date authorization started with reference number: N/A  Date authorization received and expires: N/A  Accepting facility: N/A  Follow up appointments: PCP & Specialists as needed  DME needed: None at home  Transportation at Discharge: Pt's boyfriend will provide transport at d/c  [place] or means to access home: Pt has access to home        IM Medicare Letter: 2nd IM letter needed at d/c  Is patient a [place] and connected with the [place]? N/A  Caregiver Contact: Pt's boyfriend Suzette Haynes [phone]  Discharge Caregiver contacted prior to discharge? To be contacted at d/c  Care Conference needed?: Not at this time    Initial note: Chart reviewed for updates. CM met with pt at bedside, introduced role, confirmed demographics wee up-to-date and discussed d/c planning. Pt lives with her boyfriend Suzette Haynes [phone] a 1 level home with no steps  before you get to the front door. Pt's boyfriend is her main support system. Pt is independent with ADL's at home and does not use any DME. Pt denied history of IPR/SNF/HH and OP rehab. Pt uses [facility] in [facility]. No PT/OT consults placed at this time. Pt's boyfriend will provide transport at d/c. CM will continue to follow up with d/c planning. Reason for Admission:  Acute Kidney Injury;  Hypoglcemia              RUR Score:  20% \"high risk\"        PCP: First and Last name:  Mary Mei MD     Name of Practice: [address] [place] [address]   Are you a current patient: Yes/No: Yes   Approximate date of last visit: A week ago   Can you do a virtual visit with your PCP:  In-person             Resources/supports as identified by patient/family:   Pt's boyfriend is her main support system                Top Challenges facing patient (as identified by patient/family and CM): Finances/Medication cost?  Pt has [employer] Medicare                 Transportation? Pt's boyfriend will provide transport at d/c              Support system or lack thereof? Pt has limited support system                     Living arrangements? Pt lives with her boyfriend in a 1 level home with no steps before you get to the front door              Self-care/ADLs/Cognition? Pt is independent with ADL's at home and does not use any DME at home           Current Advanced Directive/Advance Care Plan:  Full Code-ACP on file    Healthcare Decision Maker:       Primary Decision Maker: Yurychaka Segovia - Jami - [phone]    Secondary Decision Maker: Matthew Glover - Father - [phone]    Payor Source Payor: Jerome Olivares / Plan: [employer]ra Golden 8141 HMO / Product Type: Managed Care Medicare /     Care Management Interventions  PCP Verified by CM: Yes  Palliative Care Criteria Met (RRAT>21 & CHF Dx)?: No  Mode of Transport at Discharge:  Other (see comment) (Pt's boyfriend will provide transport at d/c)  Transition of Care Consult (CM Consult): Discharge Planning  Discharge Durable Medical Equipment: No  Physical Therapy Consult: No  Occupational Therapy Consult: No  Speech Therapy Consult: No  Support Systems: Spouse/Significant Other (Pt's boyfriend is her main support system)  Confirm Follow Up Transport: Family (Pt's boyfriend will provide transport at d/c)  The Plan for Transition of Care is Related to the Following Treatment Goals : Pt is d/c Home with follow up appts  Discharge Location  Patient Expects to be Discharged to[de-identified] Home (Pt is d/c Home with follow up appts)     MILENA Espino    [phone]

## 2023-04-26 NOTE — PROGRESS NOTES
Comprehensive Nutrition Assessment    Type and Reason for Visit: Initial, Wound    Nutrition Recommendations/Plan:   Continue 3 carb choice diet  Add ensure high protein BID     Malnutrition Assessment:  Malnutrition Status:  Insufficient data (04/26/23 1354)      Nutrition Assessment:    Patient medically noted for hypoglycemia and DEBORAH. PMH DM, anxiety, depression, HTN, COPD, CHF, and CKD. Referral received for PI; wound care consult pending. Patient sleeping soundly at time of attempted visit; no family present. Some weight loss noted per chart review but nothing significant for timeframes. Poor intake documented this morning. Will add ensure high protein BID. Continue consistent carb diet. Encourage intake of meals. Patient Vitals for the past 168 hrs:   % Diet Eaten   04/26/23 0830 1 - 25%     Wt Readings from Last 5 Encounters:   04/25/23 97.5 kg (215 lb)   03/28/23 95.9 kg (211 lb 6.4 oz)   03/09/23 94.9 kg (209 lb 3.5 oz)   12/12/22 102.5 kg (226 lb)   09/13/22 105 kg (231 lb 6.4 oz)     Nutrition Related Findings:    Na 131, -[phone]  1+ edema BLE  BM 4/24 Wellbutrin, Buspar, Celexa, Famotidine, Reglan, Remeron  Wound Type: Wound consult pending    Current Nutrition Intake & Therapies:  Average Meal Intake: 1-25%     ADULT DIET Regular; 3 carb choices (45 gm/meal)    Anthropometric Measures:  Height: 5' 4\" (162.6 cm)  Ideal Body Weight (IBW): 120 lbs (55 kg)     Current Body Wt:  97.5 kg (214 lb 15.2 oz), 179.1 % IBW. Current BMI (kg/m2): 36.9                          BMI Category: Obese class 2 (BMI 35.0-39. 9)    Estimated Daily Nutrient Needs:  Energy Requirements Based On: Formula  Weight Used for Energy Requirements: Current  Energy (kcal/day): 2042 kcals (BMR x 1. 3AF)  Weight Used for Protein Requirements: Ideal  Protein (g/day): 82-109g (1.5-2.0 g/kg IBW)  Method Used for Fluid Requirements: 1 ml/kcal  Fluid (ml/day): 2050 mL    Nutrition Diagnosis:   Increased nutrient needs related to  (wound healing) as evidenced by  (documented wounds)    Nutrition Interventions:   Food and/or Nutrient Delivery: Continue current diet, Start oral nutrition supplement  Nutrition Education/Counseling: No recommendations at this time  Coordination of Nutrition Care: Continue to monitor while inpatient       Goals:     Goals: PO intake 50% or greater, by next RD assessment       Nutrition Monitoring and Evaluation:   Behavioral-Environmental Outcomes: None identified  Food/Nutrient Intake Outcomes: Food and nutrient intake, Supplement intake  Physical Signs/Symptoms Outcomes: Biochemical data, Weight, Nutrition focused physical findings    Discharge Planning:    Continue current diet    Concha Ruiz RD  Contact: ext [phone]

## 2023-04-26 NOTE — PROGRESS NOTES
Endocrinology Progress Note    Reviewed blood sugars from last night and this morning remotely from my [facility] office via [employer]. Component       GLUCOSE,FAST - POC   Latest Ref Rng & Units       65 - 117 mg/dL   4/26/2023      9:23  (H)   4/26/2023      7:38  (H)   4/26/2023      6:11 AM 90   4/26/2023      4:31 AM 85   4/26/2023      2:28  (H)   4/26/2023      1:28  (H)   4/26/2023      12:50 AM 47 (LL)   4/26/2023      12:48 AM 43 (LL)   4/26/2023      12:28 AM 55 (L)   4/25/2023      10:26    4/25/2023      9:32 PM 67   4/25/2023      8:22 PM 77     She did have further hypoglycemia down to 43 last night and was given another bolus of D10 and her drip was increased too 100 ml/hr. Her blood sugar went to 200 at 9am this morning and her D10 was stopped per my order and she was given her prn dose of 10 units of lantus. Her creatinine is down from 4.12 to 2.66 this morning. I changed her accu-checks from every 2 hours to before meals and bedtime and have changed her diet order to a 45 g carb restriction and added a humalog correction scale of 2:50 >150 before meals and bedtime. Will trend her sugars at lunch and dinner to see if she would benefit from another dose of lantus this evening. I will be at [facility] all day today and won't be able to see her in person but will see her tomorrow when back at [facility]. Please don't hesitate to send me a message through perfect serve with any questions or concerns.     Carl Lawrence MD

## 2023-04-26 NOTE — PROGRESS NOTES
[phone]  Patient arrived from the ER via stretcher on an IV infusion D10 @ 80ml/hr and [email or web address]. Patient agitated, confused, and restless. Constantly pulling at her chew, IV line and taking off her oxygen. Attempting to climb out of bed. Contacted provider Reasoner in regards to patients behaviors and worry for her safety. At 2137 patient had a blood sugar of 67, informed provider Reasoner followed protocol of D10 250 in 20min. Recheck was 103. Explained concerns of pt having withdrawal, symptoms of tremors, confusion, restless and agitated. Reasoner stated \"when hypoglicemia improved we will know\"    1930- order for restraints obtained and bilateral soft wrist restraints placed on patient. informed patient spouse desiree of restraints being placed on patient. Provided education to patient and desiree about the restraints. Blood sugar checks Q2.   2024-77  2133-67(gave D10 250ml) recheck 103  0030-55(8oz apple juice, applesauce)  0051-47(D10 250ml), mac&cheese, gingerale  0128- 123  0230-156  0430-85 (120ml ensure, applesauce)  0630-90    Transferring patient to room 2305 gave report to clay JOHN from [address] [place] [address]    Blood cultures done. Restraint checks done Q2. No complications, no complaints of pain.

## 2023-04-26 NOTE — PROGRESS NOTES
TRANSFER - IN REPORT:    Verbal report received from ALVARO Tubbs(name) on Lola Fletcher  being received from [employer](unit) for routine progression of care      Report consisted of patients Situation, Background, Assessment and   Recommendations(SBAR). Information from the following report(s) SBAR, Kardex, Intake/Output, and Recent Results was reviewed with the receiving nurse. Opportunity for questions and clarification was provided. Assessment completed upon patients arrival to unit and care assumed.

## 2023-04-26 NOTE — PROGRESS NOTES
MRI PENDING    Completion of MRI Screening Sheet    Fax to [phone] when completed    Please call [phone]  When this is done    Thank You

## 2023-04-27 ENCOUNTER — APPOINTMENT (OUTPATIENT)
Dept: MRI IMAGING | Age: 53
DRG: 637 | End: 2023-04-27
Attending: INTERNAL MEDICINE
Payer: MEDICARE

## 2023-04-27 ENCOUNTER — TELEPHONE (OUTPATIENT)
Dept: ENDOCRINOLOGY | Age: 53
End: 2023-04-27

## 2023-04-27 LAB
ALBUMIN SERPL-MCNC: 2.3 G/DL (ref 3.5–5)
ALBUMIN/GLOB SERPL: 0.7 (ref 1.1–2.2)
ALP SERPL-CCNC: 88 U/L (ref 45–117)
ALT SERPL-CCNC: 89 U/L (ref 12–78)
ANION GAP SERPL CALC-SCNC: 5 MMOL/L (ref 5–15)
AST SERPL-CCNC: 137 U/L (ref 15–37)
BASOPHILS # BLD: 0 K/UL (ref 0–0.1)
BASOPHILS NFR BLD: 0 % (ref 0–1)
BILIRUB SERPL-MCNC: 0.6 MG/DL (ref 0.2–1)
BUN SERPL-MCNC: 39 MG/DL (ref 6–20)
BUN/CREAT SERPL: 22 (ref 12–20)
CALCIUM SERPL-MCNC: 7.5 MG/DL (ref 8.5–10.1)
CHLORIDE SERPL-SCNC: 106 MMOL/L (ref 97–108)
CO2 SERPL-SCNC: 28 MMOL/L (ref 21–32)
CREAT SERPL-MCNC: 1.77 MG/DL (ref 0.55–1.02)
DIFFERENTIAL METHOD BLD: ABNORMAL
EOSINOPHIL # BLD: 0 K/UL (ref 0–0.4)
EOSINOPHIL NFR BLD: 0 % (ref 0–7)
ERYTHROCYTE [DISTWIDTH] IN BLOOD BY AUTOMATED COUNT: 19 % (ref 11.5–14.5)
GLOBULIN SER CALC-MCNC: 3.3 G/DL (ref 2–4)
GLUCOSE BLD STRIP.AUTO-MCNC: 122 MG/DL (ref 65–117)
GLUCOSE BLD STRIP.AUTO-MCNC: 159 MG/DL (ref 65–117)
GLUCOSE BLD STRIP.AUTO-MCNC: 170 MG/DL (ref 65–117)
GLUCOSE BLD STRIP.AUTO-MCNC: 181 MG/DL (ref 65–117)
GLUCOSE BLD STRIP.AUTO-MCNC: 194 MG/DL (ref 65–117)
GLUCOSE BLD STRIP.AUTO-MCNC: 222 MG/DL (ref 65–117)
GLUCOSE SERPL-MCNC: 160 MG/DL (ref 65–100)
HCT VFR BLD AUTO: 26.4 % (ref 35–47)
HGB BLD-MCNC: 8 G/DL (ref 11.5–16)
IMM GRANULOCYTES # BLD AUTO: 0 K/UL (ref 0–0.04)
IMM GRANULOCYTES NFR BLD AUTO: 1 % (ref 0–0.5)
LYMPHOCYTES # BLD: 1.6 K/UL (ref 0.8–3.5)
LYMPHOCYTES NFR BLD: 22 % (ref 12–49)
MCH RBC QN AUTO: 24.8 PG (ref 26–34)
MCHC RBC AUTO-ENTMCNC: 30.3 G/DL (ref 30–36.5)
MCV RBC AUTO: 81.7 FL (ref 80–99)
MONOCYTES # BLD: 0.7 K/UL (ref 0–1)
MONOCYTES NFR BLD: 9 % (ref 5–13)
NEUTS SEG # BLD: 4.9 K/UL (ref 1.8–8)
NEUTS SEG NFR BLD: 68 % (ref 32–75)
NRBC # BLD: 0 K/UL (ref 0–0.01)
NRBC BLD-RTO: 0 PER 100 WBC
PLATELET # BLD AUTO: 255 K/UL (ref 150–400)
PMV BLD AUTO: 10.1 FL (ref 8.9–12.9)
POTASSIUM SERPL-SCNC: 4.1 MMOL/L (ref 3.5–5.1)
PROT SERPL-MCNC: 5.6 G/DL (ref 6.4–8.2)
RBC # BLD AUTO: 3.23 M/UL (ref 3.8–5.2)
SERVICE CMNT-IMP: ABNORMAL
SODIUM SERPL-SCNC: 139 MMOL/L (ref 136–145)
WBC # BLD AUTO: 7.3 K/UL (ref 3.6–11)

## 2023-04-27 PROCEDURE — 74011250637 HC RX REV CODE- 250/637: Performed by: INTERNAL MEDICINE

## 2023-04-27 PROCEDURE — 36415 COLL VENOUS BLD VENIPUNCTURE: CPT

## 2023-04-27 PROCEDURE — 70551 MRI BRAIN STEM W/O DYE: CPT

## 2023-04-27 PROCEDURE — 80053 COMPREHEN METABOLIC PANEL: CPT

## 2023-04-27 PROCEDURE — 85025 COMPLETE CBC W/AUTO DIFF WBC: CPT

## 2023-04-27 PROCEDURE — 74011000258 HC RX REV CODE- 258: Performed by: INTERNAL MEDICINE

## 2023-04-27 PROCEDURE — 74011000250 HC RX REV CODE- 250: Performed by: INTERNAL MEDICINE

## 2023-04-27 PROCEDURE — 74011000258 HC RX REV CODE- 258: Performed by: PHYSICIAN ASSISTANT

## 2023-04-27 PROCEDURE — 99231 SBSQ HOSP IP/OBS SF/LOW 25: CPT | Performed by: INTERNAL MEDICINE

## 2023-04-27 PROCEDURE — 74011636637 HC RX REV CODE- 636/637: Performed by: INTERNAL MEDICINE

## 2023-04-27 PROCEDURE — 74011250637 HC RX REV CODE- 250/637: Performed by: PHYSICIAN ASSISTANT

## 2023-04-27 PROCEDURE — 65660000001 HC RM ICU INTERMED STEPDOWN

## 2023-04-27 PROCEDURE — 82962 GLUCOSE BLOOD TEST: CPT

## 2023-04-27 PROCEDURE — 74011250636 HC RX REV CODE- 250/636: Performed by: INTERNAL MEDICINE

## 2023-04-27 PROCEDURE — 77010033678 HC OXYGEN DAILY

## 2023-04-27 RX ORDER — INSULIN GLARGINE 100 [IU]/ML
30 INJECTION, SOLUTION SUBCUTANEOUS DAILY
Status: DISCONTINUED | OUTPATIENT
Start: 2023-04-27 | End: 2023-04-28

## 2023-04-27 RX ORDER — ALPRAZOLAM 0.25 MG/1
0.25 TABLET ORAL ONCE
Status: DISPENSED | OUTPATIENT
Start: 2023-04-27 | End: 2023-04-27

## 2023-04-27 RX ORDER — ALPRAZOLAM 0.5 MG/1
1 TABLET ORAL 3 TIMES DAILY
Status: DISCONTINUED | OUTPATIENT
Start: 2023-04-27 | End: 2023-04-28 | Stop reason: HOSPADM

## 2023-04-27 RX ADMIN — INSULIN GLARGINE 30 UNITS: 100 INJECTION, SOLUTION SUBCUTANEOUS at 09:00

## 2023-04-27 RX ADMIN — Medication 2 UNITS: at 12:09

## 2023-04-27 RX ADMIN — SODIUM CHLORIDE 100 ML/HR: 9 INJECTION, SOLUTION INTRAVENOUS at 17:08

## 2023-04-27 RX ADMIN — CITALOPRAM HYDROBROMIDE 40 MG: 20 TABLET ORAL at 08:47

## 2023-04-27 RX ADMIN — Medication: at 21:26

## 2023-04-27 RX ADMIN — ASPIRIN 81 MG: 81 TABLET, COATED ORAL at 08:47

## 2023-04-27 RX ADMIN — CEFTRIAXONE 1 G: 1 INJECTION, POWDER, FOR SOLUTION INTRAMUSCULAR; INTRAVENOUS at 15:05

## 2023-04-27 RX ADMIN — Medication 2 UNITS: at 21:25

## 2023-04-27 RX ADMIN — HYDROXYZINE HYDROCHLORIDE 25 MG: 10 TABLET ORAL at 21:24

## 2023-04-27 RX ADMIN — Medication: at 02:19

## 2023-04-27 RX ADMIN — HEPARIN SODIUM 5000 UNITS: 5000 INJECTION INTRAVENOUS; SUBCUTANEOUS at 06:14

## 2023-04-27 RX ADMIN — HEPARIN SODIUM 5000 UNITS: 5000 INJECTION INTRAVENOUS; SUBCUTANEOUS at 21:25

## 2023-04-27 RX ADMIN — BUSPIRONE HYDROCHLORIDE 30 MG: 10 TABLET ORAL at 17:02

## 2023-04-27 RX ADMIN — SODIUM CHLORIDE, PRESERVATIVE FREE 10 ML: 5 INJECTION INTRAVENOUS at 15:06

## 2023-04-27 RX ADMIN — DEXTROSE AND SODIUM CHLORIDE 100 ML/HR: 5; 900 INJECTION, SOLUTION INTRAVENOUS at 02:18

## 2023-04-27 RX ADMIN — BUPROPION HYDROCHLORIDE 150 MG: 150 TABLET, EXTENDED RELEASE ORAL at 08:49

## 2023-04-27 RX ADMIN — SODIUM CHLORIDE, PRESERVATIVE FREE 10 ML: 5 INJECTION INTRAVENOUS at 21:24

## 2023-04-27 RX ADMIN — BREXPIPRAZOLE 1 MG: 1 TABLET ORAL at 08:49

## 2023-04-27 RX ADMIN — METOCLOPRAMIDE 5 MG: 10 TABLET ORAL at 08:47

## 2023-04-27 RX ADMIN — ALPRAZOLAM 1 MG: 0.5 TABLET ORAL at 15:05

## 2023-04-27 RX ADMIN — SODIUM CHLORIDE, PRESERVATIVE FREE 10 ML: 5 INJECTION INTRAVENOUS at 06:14

## 2023-04-27 RX ADMIN — FAMOTIDINE 20 MG: 20 TABLET, FILM COATED ORAL at 08:47

## 2023-04-27 RX ADMIN — BUSPIRONE HYDROCHLORIDE 30 MG: 10 TABLET ORAL at 08:47

## 2023-04-27 RX ADMIN — MIRTAZAPINE 15 MG: 15 TABLET, FILM COATED ORAL at 08:47

## 2023-04-27 RX ADMIN — ALPRAZOLAM 1 MG: 0.5 TABLET ORAL at 21:24

## 2023-04-27 RX ADMIN — Medication 4 UNITS: at 08:47

## 2023-04-27 RX ADMIN — BREXPIPRAZOLE 1 MG: 1 TABLET ORAL at 17:03

## 2023-04-27 RX ADMIN — HEPARIN SODIUM 5000 UNITS: 5000 INJECTION INTRAVENOUS; SUBCUTANEOUS at 15:05

## 2023-04-27 NOTE — PROGRESS NOTES
Endocrinology Progress Note    I will be by to see her later today but her sugar is still over 200 this morning and it appears she is on D5NS so I discontinued this and put her back on plain  ml/hr and put in a daily dose of lantus 30 units. Please don't hesitate to send me a message through perfect serve with any questions or concerns.     Chastity Holcomb MD

## 2023-04-27 NOTE — PROGRESS NOTES
Hospitalist Progress Note    NAME: Linda Hyde   :  1970   MRN:  [id number]       Assessment / Plan:    e     Hypoglycemia  Diabetes mellitus uncontrolled  -Patient is coming in with blood sugars in the low 30s at home  -Recent A1c in January was 10.9  -Received multiple doses of dextrose in the emergency department but still hypoglycemic so was started D10 drip initially then it was stopped as per endocrinology recommendations  -Endocrinology input is highly appreciated  - Dextrose IV fluid discontinued  -Started on Lantus 30 by endocrinology  -We will monitor blood sugar  -Patient on diabetic regimen diet    Acute kidney injury  -Baseline creatinine is around 1.5-2 and on admission creatinine is 4.1  -UA with bacteriuria and pyuria  -not on nephrotoxic medications at home  -Continue IV fluid support  - Creatinine is improving 1.7 today  -Monitor urine output    = CT abdomen with no hydronephrosis    Acute urinary tract infection  - UA with bacteriuria and pyuria  - Urine culture? Not sent  - We will do 3 days of ceftriaxone and discontinue  Blood culture negative     Acute transaminitis  -ALT and AST are both elevated 162 on 424. Total bilirubin is within normal limits  -Patient's father does not report any frequent alcohol use  -Check PT/INR  -CT abdomen reviewed  - Transaminitis trending down     Acute metabolic encephalopathy likely related to hypoglycemia  -She continues to be alert oriented x1-2, confused  -Exam is nonfocal and is able to move all 4 extremities, pupils are reactive bilaterally  -CT head with no acute abnormality reported  -MRI pending      Mild hyponatremia  -Improving           Congestive heart failure  Chronic respiratory failure due to COPD on 3 L  Depression  PTSD  GERD  Radiculopathy  -Continue home Rexulti, Wellbutrin, BuSpar, Celexa.     -Resumed on alprazolam at half dose 1 mg 3 times daily, to prevent withdrawals  -Continue home famotidine  -Continue home regimen Romel          Will get PT OT eval     Medical Decision Making:   I personally reviewed labs: CBC, CMP,   Toxic drug monitoring: Monitor blood sugars    Patient mentation is improving, with some confusion. MRI is pending will remain inpatient. Alprazolam resumed at half dose after discussion with pharmacy     Code Status: Full code  DVT Prophylaxis: Lovenox  GI Prophylaxis: PT famotidine      30.0 - 39.9 Obese / Body mass index is 36.9 kg/m². Estimated discharge date: April 28 or 29  Barriers: Clinical improvement-mri, mentation improvement, monitoring blood sugar     Subjective:     Patient was seen and examined. Per documentation soft restraints were used overnight. Patient alert oriented x1-2 with some confusion follow commands. Stated she did not like the bracelet which is used overnight(referring to the restraints) stated she got Atarax overnight. Patient stated she lives with boyfriend I confirmed the same with her boyfriend. Updated boyfriend over phone regarding plan  Review of Systems:  Symptom Y/N Comments  Symptom Y/N Comments   Fever/Chills n   Chest Pain n    Poor Appetite    Edema     Cough n   Abdominal Pain n    Sputum    Joint Pain     SOB/HERNÁNDEZ n   Pruritis/Rash     Nausea/vomit n   Tolerating PT/OT     Diarrhea    Tolerating Diet y    Constipation    Other       Could NOT obtain due to:          Objective:     VITALS:   Last 24hrs VS reviewed since prior progress note.  Most recent are:  Patient Vitals for the past 24 hrs:   Temp Pulse Resp BP SpO2   04/27/23 1107 98.3 °F (36.8 °C) 93 16 116/79 --   04/27/23 0714 98 °F (36.7 °C) (!) 110 18 (!) 140/62 93 %   04/27/23 0244 97.9 °F (36.6 °C) 98 23 134/65 93 %   04/26/23 2247 98.3 °F (36.8 °C) (!) 102 19 132/70 93 %   04/26/23 1919 98.7 °F (37.1 °C) 90 22 127/62 94 %   04/26/23 1600 -- 100 -- -- --   04/26/23 1539 98 °F (36.7 °C) 97 25 124/66 96 %         Intake/Output Summary (Last 24 hours) at 4/27/2023 1347  Last data filed at 4/27/2023 1240  Gross per 24 hour   Intake 2903.33 ml   Output 2825 ml   Net 78.33 ml          I had a face to face encounter and independently examined this patient on 4/27/2023, as outlined below:  PHYSICAL EXAM:  General: WD, WN. Alert, cooperative, no acute distress    EENT:  EOMI. Anicteric sclerae. MMM  Resp:  CTA bilaterally, no wheezing or rales. No accessory muscle use  CV:  Regular  rhythm,  No edema  GI:  Soft, Non distended, Non tender. +Bowel sounds  Neurologic:  AO x1-2, following commands  Psych:    Not anxious nor agitated  Skin:  No rashes. No jaundice    Reviewed most current lab test results and cultures  YES  Reviewed most current radiology test results   YES  Review and summation of old records today    NO  Reviewed patient's current orders and MAR    YES  PMH/ reviewed - no change compared to H&P  ________________________________________________________________________  Care Plan discussed with:    Comments   Patient X    Family   Boyfriend   RN X    Care Manager X    Consultant                       X Multidiciplinary team rounds were held today with , nursing, pharmacist and clinical coordinator. Patient's plan of care was discussed; medications were reviewed and discharge planning was addressed. ________________________________________________________________________        Comments   >50% of visit spent in counseling and coordination of care X    ________________________________________________________________________  Shira Toribio MD     Procedures: see electronic medical records for all procedures/Xrays and details which were not copied into this note but were reviewed prior to creation of Plan. LABS:  I reviewed today's most current labs and imaging studies.   Pertinent labs include:  Recent Labs     04/27/23  0009 04/26/23  0100 04/25/23  1200   WBC 7.3 9.2 8.9   HGB 8.0* 8.8* 9.9*   HCT 26.4* 28.2* 31.6*    242 319       Recent Labs     04/27/23  0009 04/26/23  0100 04/25/23  1200    131* 129*   K 4.1 4.0 4.0    96* 92*   CO2 28 27 29   * 93 33*   BUN 39* 64* 81*   CREA 1.77* 2.66* 4.12*   CA 7.5* 7.6* 8.5   ALB 2.3* 2.6* 3.3*   TBILI 0.6 0.4 0.8   ALT 89* 120* 162*         Signed: Radha Hernandez MD    45 minutes spent

## 2023-04-27 NOTE — PROGRESS NOTES
Problem: Non-Violent Restraints  Goal: Removal from restraints as soon as assessed to be safe  4/27/2023 0628 by Fabricio Starr RN  Outcome: Resolved/Not Met  4/27/2023 0021 by Fabricio Starr RN  Outcome: Progressing Towards Goal  Goal: No harm/injury to patient while restraints in use  4/27/2023 0628 by Fabricio Starr RN  Outcome: Resolved/Not Met  4/27/2023 0021 by Fabricio Starr RN  Outcome: Progressing Towards Goal  Goal: Patient's dignity will be maintained  4/27/2023 0628 by Fabricio Starr RN  Outcome: Resolved/Not Met  4/27/2023 0021 by Fabricio Starr RN  Outcome: Progressing Towards Goal  Goal: Patient Interventions  4/27/2023 0628 by Fabricio Starr RN  Outcome: Resolved/Not Met  4/27/2023 0021 by Fabricio Starr RN  Outcome: Progressing Towards Goal

## 2023-04-27 NOTE — PROGRESS NOTES
Endocrine progress note    Blood sugar back over 200 so ordered a one time dose of lantus 10 units now.

## 2023-04-27 NOTE — TELEPHONE ENCOUNTER
I came by to see patient tonight in her hospital room (see inpatient note) and asked her about this message as she has not had any low blood sugars the past 24 hours and she states she left this message to try and get my attention so I would call her back and apologized for leaving this message that was inaccurate.

## 2023-04-27 NOTE — PROGRESS NOTES
Care plan resolved. I spoke to Meagan Smith. She is continuing to have cramps. She is using her CPAP machine regularly for her sleep apnea. Leg movement disorder was not seen on her sleep study. She will take a vitamin B complex vitamin daily.   She will try tonic water 6 o

## 2023-04-27 NOTE — PROGRESS NOTES
Problem: Falls - Risk of  Goal: *Absence of Falls  Description: Document Ag Bianca Fall Risk and appropriate interventions in the flowsheet.   Outcome: Progressing Towards Goal  Note: Fall Risk Interventions:                                Problem: Patient Education: Go to Patient Education Activity  Goal: Patient/Family Education  Outcome: Progressing Towards Goal

## 2023-04-27 NOTE — PROGRESS NOTES
3502: Bedside shift change report given to Halley Aguilar RN (oncoming nurse) by Aries Juan RN (offgoing nurse). Report included the following information SBAR and Kardex. 1900: End of Shift Note    Bedside shift change report given to Aries Juan RN (oncoming nurse) by Alfred Guerrero RN (offgoing nurse). Report included the following information SBAR, Kardex, and MAR    Shift worked:  7a7p     Shift summary and any significant changes:     Please see chart and aforementioned notes. Concerns for physician to address:  None at this time     Zone phone for oncoming shift:          Activity:  Activity Level: Bed Rest  Number times ambulated in hallways past shift: 0  Number of times OOB to chair past shift: 0    Cardiac:   Cardiac Monitoring: Yes      Cardiac Rhythm: Sinus Rhythm    Access:  Current line(s): PIV     Genitourinary:   Urinary status: chew    Respiratory:   O2 Device: Nasal cannula  Chronic home O2 use?: YES  Incentive spirometer at bedside: NO       GI:  Last Bowel Movement Date: 04/27/23  Current diet:  ADULT DIET Regular; 3 carb choices (45 gm/meal)  ADULT ORAL NUTRITION SUPPLEMENT Breakfast, Dinner;  Low Calorie/High Protein  Passing flatus: YES  Tolerating current diet: YES       Pain Management:   Patient states pain is manageable on current regimen: YES    Skin:  Joey Score: 13  Interventions: increase time out of bed and PT/OT consult    Patient Safety:  Fall Score:    Interventions: bed/chair alarm, assistive device (walker, cane, etc), gripper socks, and pt to call before getting OOB       Length of Stay:  Expected LOS: 2d 21h  Actual LOS: [address], RN

## 2023-04-27 NOTE — TELEPHONE ENCOUNTER
Pt LVM at 2:39 PM asking Dr Tanika Pisano to give her a call regarding her diabetes . Ms Badillo Fairly states since changing her insulin dosing per Dr Tanika Pisano she is having low blood glucose mainly at night. Pt stated she's had readings in the 20's. Pt says she'll have to call back when her nurse returns as she doesn't know exactly what she is taking.

## 2023-04-27 NOTE — PROGRESS NOTES
Endocrinology Progress Note    Came by to see patient this evening. She looks much better than when I saw her in the ER on the evening of 4/25/23 and is speaking more clearly and does not appear as groggy and is moving around well in the bed. She states her appetite is not too good and has not been finishing her trays. I reviewed her sugars since yesterday:    Component       GLUCOSE,FAST - POC   Latest Ref Rng & Units       65 - 117 mg/dL   4/27/2023      4:27  (H)   4/27/2023      11:31  (H)   4/27/2023      7:29  (H)   4/27/2023      2:06  (H)   4/27/2023      12:03  (H)   4/26/2023      10:46  (H)   4/26/2023      8:29  (H)     After receiving her dose of dose of lantus 30 units this morning and humalog 4 units, her sugar was down to 159 at lunch and received 2 units and sugar before dinner was 122 and didn't require any correction humalog. I told her that I will observe her sugars overnight and given her creatinine has improved down to 1.77 this morning which is  to her baseline, I'm hopeful that if she is medically stable, she'll be able to go home tomorrow. Assessment/Plan:  1) Type 1 diabetes uncontrolled with hypoglycemia: I am concerned that she likely has developed acute kidney injury due to possible dehydration and a UTI after having higher sugars on Friday and Saturday and this led to her not clearing her insulin as much the past 24-48 hours and likely her lantus clearance was prolonged leading to her severe hypoglycemia. Her creatinine has improved and she has done well with resumption of lantus at a lower dose (was on 56 units daily at home). Will assess her sugars overnight and tomorrow morning to determine a plan for her insulin if she is medically stable to go home tomorrow.   - cont lantus 30 units daily  - cont correction humalog 2:50 > 150   - cont accu-checks before meals and bedtime  - follow creatinine on her metabolic panels Please don't hesitate to send me a message through perfect serve with any questions or concerns. I spent 25 minutes of face to face time on her case and > 50% of the time was spent reviewing the chart and coordinating her care with the nurse caring for her.         Nereyda Huang MD

## 2023-04-28 VITALS
OXYGEN SATURATION: 91 % | DIASTOLIC BLOOD PRESSURE: 60 MMHG | BODY MASS INDEX: 36.7 KG/M2 | RESPIRATION RATE: 14 BRPM | TEMPERATURE: 97.9 F | HEIGHT: 64 IN | SYSTOLIC BLOOD PRESSURE: 120 MMHG | WEIGHT: 215 LBS | HEART RATE: 74 BPM

## 2023-04-28 LAB
ALBUMIN SERPL-MCNC: 2.4 G/DL (ref 3.5–5)
ALBUMIN/GLOB SERPL: 0.7 (ref 1.1–2.2)
ALP SERPL-CCNC: 92 U/L (ref 45–117)
ALT SERPL-CCNC: 73 U/L (ref 12–78)
ANION GAP SERPL CALC-SCNC: 3 MMOL/L (ref 5–15)
AST SERPL-CCNC: 71 U/L (ref 15–37)
BASOPHILS # BLD: 0 K/UL (ref 0–0.1)
BASOPHILS NFR BLD: 1 % (ref 0–1)
BILIRUB SERPL-MCNC: 0.6 MG/DL (ref 0.2–1)
BUN SERPL-MCNC: 26 MG/DL (ref 6–20)
BUN/CREAT SERPL: 17 (ref 12–20)
CALCIUM SERPL-MCNC: 7.9 MG/DL (ref 8.5–10.1)
CHLORIDE SERPL-SCNC: 107 MMOL/L (ref 97–108)
CO2 SERPL-SCNC: 28 MMOL/L (ref 21–32)
CREAT SERPL-MCNC: 1.49 MG/DL (ref 0.55–1.02)
DIFFERENTIAL METHOD BLD: ABNORMAL
EOSINOPHIL # BLD: 0.1 K/UL (ref 0–0.4)
EOSINOPHIL NFR BLD: 1 % (ref 0–7)
ERYTHROCYTE [DISTWIDTH] IN BLOOD BY AUTOMATED COUNT: 18.9 % (ref 11.5–14.5)
GLOBULIN SER CALC-MCNC: 3.4 G/DL (ref 2–4)
GLUCOSE BLD STRIP.AUTO-MCNC: 203 MG/DL (ref 65–117)
GLUCOSE BLD STRIP.AUTO-MCNC: 247 MG/DL (ref 65–117)
GLUCOSE SERPL-MCNC: 209 MG/DL (ref 65–100)
HCT VFR BLD AUTO: 29.1 % (ref 35–47)
HGB BLD-MCNC: 8.8 G/DL (ref 11.5–16)
IMM GRANULOCYTES # BLD AUTO: 0 K/UL (ref 0–0.04)
IMM GRANULOCYTES NFR BLD AUTO: 0 % (ref 0–0.5)
LYMPHOCYTES # BLD: 2.2 K/UL (ref 0.8–3.5)
LYMPHOCYTES NFR BLD: 34 % (ref 12–49)
MCH RBC QN AUTO: 25.1 PG (ref 26–34)
MCHC RBC AUTO-ENTMCNC: 30.2 G/DL (ref 30–36.5)
MCV RBC AUTO: 82.9 FL (ref 80–99)
MONOCYTES # BLD: 0.8 K/UL (ref 0–1)
MONOCYTES NFR BLD: 12 % (ref 5–13)
NEUTS SEG # BLD: 3.3 K/UL (ref 1.8–8)
NEUTS SEG NFR BLD: 52 % (ref 32–75)
NRBC # BLD: 0 K/UL (ref 0–0.01)
NRBC BLD-RTO: 0 PER 100 WBC
PLATELET # BLD AUTO: 268 K/UL (ref 150–400)
PMV BLD AUTO: 9.7 FL (ref 8.9–12.9)
POTASSIUM SERPL-SCNC: 4.5 MMOL/L (ref 3.5–5.1)
PROT SERPL-MCNC: 5.8 G/DL (ref 6.4–8.2)
RBC # BLD AUTO: 3.51 M/UL (ref 3.8–5.2)
SERVICE CMNT-IMP: ABNORMAL
SERVICE CMNT-IMP: ABNORMAL
SODIUM SERPL-SCNC: 138 MMOL/L (ref 136–145)
WBC # BLD AUTO: 6.5 K/UL (ref 3.6–11)

## 2023-04-28 PROCEDURE — 74011250636 HC RX REV CODE- 250/636: Performed by: INTERNAL MEDICINE

## 2023-04-28 PROCEDURE — 97161 PT EVAL LOW COMPLEX 20 MIN: CPT | Performed by: PHYSICAL THERAPIST

## 2023-04-28 PROCEDURE — 51798 US URINE CAPACITY MEASURE: CPT

## 2023-04-28 PROCEDURE — 74011000250 HC RX REV CODE- 250: Performed by: INTERNAL MEDICINE

## 2023-04-28 PROCEDURE — 82962 GLUCOSE BLOOD TEST: CPT

## 2023-04-28 PROCEDURE — 99231 SBSQ HOSP IP/OBS SF/LOW 25: CPT | Performed by: INTERNAL MEDICINE

## 2023-04-28 PROCEDURE — 85025 COMPLETE CBC W/AUTO DIFF WBC: CPT

## 2023-04-28 PROCEDURE — 77010033678 HC OXYGEN DAILY

## 2023-04-28 PROCEDURE — 74011250637 HC RX REV CODE- 250/637: Performed by: INTERNAL MEDICINE

## 2023-04-28 PROCEDURE — 97165 OT EVAL LOW COMPLEX 30 MIN: CPT

## 2023-04-28 PROCEDURE — 97116 GAIT TRAINING THERAPY: CPT | Performed by: PHYSICAL THERAPIST

## 2023-04-28 PROCEDURE — 74011636637 HC RX REV CODE- 636/637: Performed by: INTERNAL MEDICINE

## 2023-04-28 PROCEDURE — 80053 COMPREHEN METABOLIC PANEL: CPT

## 2023-04-28 PROCEDURE — 36415 COLL VENOUS BLD VENIPUNCTURE: CPT

## 2023-04-28 PROCEDURE — 97535 SELF CARE MNGMENT TRAINING: CPT

## 2023-04-28 RX ORDER — INSULIN GLARGINE 100 [IU]/ML
35 INJECTION, SOLUTION SUBCUTANEOUS DAILY
Status: DISCONTINUED | OUTPATIENT
Start: 2023-04-28 | End: 2023-04-28 | Stop reason: HOSPADM

## 2023-04-28 RX ORDER — INSULIN GLARGINE 100 [IU]/ML
INJECTION, SOLUTION SUBCUTANEOUS
Qty: 1 ML | Refills: 0 | Status: SHIPPED
Start: 2023-04-29

## 2023-04-28 RX ORDER — INSULIN LISPRO 100 [IU]/ML
INJECTION, SOLUTION INTRAVENOUS; SUBCUTANEOUS
Qty: 20 ML | Refills: 11 | Status: SHIPPED
Start: 2023-04-28

## 2023-04-28 RX ORDER — INSULIN GLARGINE 100 [IU]/ML
47 INJECTION, SOLUTION SUBCUTANEOUS DAILY
COMMUNITY
End: 2023-04-28

## 2023-04-28 RX ORDER — INSULIN LISPRO 100 [IU]/ML
4 INJECTION, SOLUTION INTRAVENOUS; SUBCUTANEOUS
Status: DISCONTINUED | OUTPATIENT
Start: 2023-04-28 | End: 2023-04-28 | Stop reason: HOSPADM

## 2023-04-28 RX ORDER — INSULIN GLARGINE 100 [IU]/ML
35 INJECTION, SOLUTION SUBCUTANEOUS DAILY
Qty: 20 ML | Refills: 11 | Status: SHIPPED
Start: 2023-04-28

## 2023-04-28 RX ORDER — INSULIN LISPRO 100 [IU]/ML
INJECTION, SOLUTION INTRAVENOUS; SUBCUTANEOUS
Qty: 1 EACH | Refills: 0 | Status: SHIPPED
Start: 2023-04-28

## 2023-04-28 RX ORDER — INSULIN LISPRO 100 [IU]/ML
10 INJECTION, SOLUTION INTRAVENOUS; SUBCUTANEOUS
COMMUNITY
End: 2023-04-28

## 2023-04-28 RX ADMIN — Medication 4 UNITS: at 12:29

## 2023-04-28 RX ADMIN — BUPROPION HYDROCHLORIDE 150 MG: 150 TABLET, EXTENDED RELEASE ORAL at 08:19

## 2023-04-28 RX ADMIN — Medication 4 UNITS: at 08:34

## 2023-04-28 RX ADMIN — BREXPIPRAZOLE 1 MG: 1 TABLET ORAL at 08:19

## 2023-04-28 RX ADMIN — Medication: at 05:42

## 2023-04-28 RX ADMIN — ALPRAZOLAM 1 MG: 0.5 TABLET ORAL at 08:19

## 2023-04-28 RX ADMIN — HEPARIN SODIUM 5000 UNITS: 5000 INJECTION INTRAVENOUS; SUBCUTANEOUS at 05:41

## 2023-04-28 RX ADMIN — Medication 4 UNITS: at 08:33

## 2023-04-28 RX ADMIN — INSULIN GLARGINE 35 UNITS: 100 INJECTION, SOLUTION SUBCUTANEOUS at 08:33

## 2023-04-28 RX ADMIN — CITALOPRAM HYDROBROMIDE 40 MG: 20 TABLET ORAL at 08:19

## 2023-04-28 RX ADMIN — SODIUM CHLORIDE 100 ML/HR: 9 INJECTION, SOLUTION INTRAVENOUS at 06:09

## 2023-04-28 RX ADMIN — FAMOTIDINE 20 MG: 20 TABLET, FILM COATED ORAL at 08:19

## 2023-04-28 RX ADMIN — MIRTAZAPINE 15 MG: 15 TABLET, FILM COATED ORAL at 08:19

## 2023-04-28 RX ADMIN — BUSPIRONE HYDROCHLORIDE 30 MG: 10 TABLET ORAL at 08:19

## 2023-04-28 RX ADMIN — ASPIRIN 81 MG: 81 TABLET, COATED ORAL at 08:19

## 2023-04-28 RX ADMIN — SODIUM CHLORIDE, PRESERVATIVE FREE 10 ML: 5 INJECTION INTRAVENOUS at 05:42

## 2023-04-28 NOTE — PROGRESS NOTES
Endocrinology Progress Note    Came by to see patient this morning after reviewing her overnight blood sugars:    Component      Latest Ref Rng & Units 4/28/2023 4/27/2023           7:27 AM  8:53 PM   GLUCOSE,FAST - POC      65 - 117 mg/dL 247 (H) 194 (H)   Performed by       Judith Gastelum PCT Angela Orozco     Component      Latest Ref Rng & Units 4/27/2023           4:27 PM   GLUCOSE,FAST - POC      65 - 117 mg/dL 122 (H)   Performed by       Poncho Grace PCT     She states she had some peanut butter crackers last night after dinner that likely caused her blood sugar to rise overnight. Her creatinine is down to 1.49 this morning which is at her baseline. Her MRI of her brain came back negative. She is feeling well this morning and feels her appetite is improving such that I will restart her humalog before meals. From my perspective she is stable for discharge but will defer to the hospitalist team.  I will arrange for outpatient follow up in the next few weeks as she had to miss her last scheduled appointment on Tuesday 4/25/23 as she was admitted that day. Assessment/Plan:  1) Type 1 diabetes uncontrolled with hypoglycemia: I was concerned that she likely developed acute kidney injury due to possible dehydration and a UTI after having higher sugars on Friday and Saturday and this led to her not clearing her insulin as much the next 24-48 hours and likely her lantus clearance was prolonged leading to her severe hypoglycemia. Her creatinine has improved back to baseline and she has done well with resumption of lantus at a lower dose (was on 56 units daily at home) but will increase her dose this morning and add back humalog before meals at a lower dose and feel she is medically stable to go home from my perspective.   - increase lantus to 35 units daily  - begin humalog 4 units before meals  - cont correction humalog 2:50 > 150   - cont accu-checks before meals and bedtime  - follow creatinine on her metabolic panels  - will arrange for outpatient follow up in the next few weeks    I put the following instructions in her discharge paperwork:    1) Decrease Lantus to 35 units every morning. 2) Dose your humalog based on the scale below if you are eating:  Blood sugar                 Insulin dose                                                                                           Less than 90               Eat first, take 3 units                                                  4 units                         151-200                       6 units                                                 201-250                       8 units                                     251-300                       10 units                                                 301-350                       12 units                                                 351-400                       14 units  401-450                       16 units  451-500                       18 units  Over 500                      20 units     3) If your sugar is over 200 and you are not eating, you can dose humalog on the scale below:  Blood sugar                 Insulin dose                                                                                           201-250                       4 units                            251-300                       6 units                                        301-350                       8 units                                        351-400                       10 units  401-450                       12 units  451-500                       14 units  Over 500                     16 units    4) Our office will contact you for a follow up visit in the next few weeks.   Don't hesitate to call [phone] with any questions.  -------------------------------------------------------------------------------------------------------------------       Please don't hesitate to send me a message through perfect serve with any questions or concerns. I spent 25 minutes of face to face time on her case and > 50% of the time was spent reviewing the chart and coordinating her care with the nurse caring for her.         Viraj Crespo MD

## 2023-04-28 NOTE — PROGRESS NOTES
No further CM needs identified at this time. Transition of Care Plan:     RUR: 20% \"high risk\"  Disposition: Home with [facility]  If SNF or IPR: Date FOC offered: M/A  Date FOC received: N/A  Date authorization started with reference number: N/A  Date authorization received and expires: N/A  Accepting facility: N/A  Follow up appointments: PCP & Specialists as needed  DME needed: None at home  Transportation at Discharge: Pt's boyfriend will provide transport at d/c  [place] or means to access home: Pt has access to home        IM Medicare Letter: 2nd IM letter given; signed copy on chart  Is patient a [place] and connected with the [address] [employer]? N/A  Caregiver Contact: Pt's gorgeiencallie Cornejo [phone]  Discharge Caregiver contacted prior to discharge? Per pt's request  Care Conference needed?: Not at this time    Update 4pm: Pt was accepted to [facility]. CM has reached out to pt's via phone and notified her that [facility] will be reaching out to schedule services. No further CM needs identified at this time. Update 1445 pm: CM notified by assigned RN that pt's father is requesting to d/c and not willing to wait on CM to get a [facility] agency. CM has notified the assigned RN that pt is has still not been accepted to any [facility] agency. CM has notified assigned RN that pt can d/c and CM will call pt to give updates once she has gotten [facility] for pt. No additional questions/concerns reported by pt. CM will continue to work on getting pt [facility] services. No further CM needs identified at this time. Update 1410 pm: CM met with pt to get address. Pt has provided address. CM has sent updated address to [facility] agencies pending acceptance. Awaiting acceptance from [address]. CM will continue to follow up with d/c planning. Update 1330 pm: PT is recommending HH. CM has sent referrals pending acceptance. CM will continue to follow up with d/c planning.     Update 11 am: CM met with pt at bedside, introduced role and discussed d/c planning. Pt is agreeable with d/c. 2nd IM letter given; signed copy on chart. Pt reports she has worker with PT/OT however when asked what was recommended pt reported that they told her that she is did well. Pt appeared to be dozing off while talking with CM. Pt did confirm that her boyfriend will provide transport at d/c. No additional questions/concerns reported by pt. CM is awaiting PT/OT recommendations. PCP appointment has been scheduled by the assistant ALEK. CM will continue to follow up with d/c planning,    Initial note: Chart reviewed for updates. It was discussed in IDR's that pt will be discharging today. CM is awaiting PT/OT recommendations. CM will meet with pt at bedside to discuss d/c planning. Assistant CM to schedule a PCP appointment for pt. CM will continue to follow up with d/c planning. Care Management Interventions  PCP Verified by CM: Yes  Palliative Care Criteria Met (RRAT>21 & CHF Dx)?: No  Mode of Transport at Discharge:  Other (see comment) (Pt's boyfriend to provide transport at d/c)  Transition of Care Consult (CM Consult): Discharge Planning  Discharge Durable Medical Equipment: No  Physical Therapy Consult: Yes  Occupational Therapy Consult: Yes  Speech Therapy Consult: Yes  Support Systems: Spouse/Significant Other (Pt's boyfriend is her main support system)  Confirm Follow Up Transport: Family (Pt's boyfriend to provide transport at d/c)  The Plan for Transition of Care is Related to the Following Treatment Goals : Home with follow up appts pending PT/OT recommendations  Discharge Location  Patient Expects to be Discharged to[de-identified] Home (Pt is d/c Home with follow up appts pending PT/OT recommendations)     MILENA Solo    [phone]

## 2023-04-28 NOTE — PROGRESS NOTES
Comprehensive Nutrition Assessment    Type and Reason for Visit: Reassess    Nutrition Recommendations/Plan:   Continue current diet and ONS     Malnutrition Assessment:  Malnutrition Status:  No malnutrition (04/28/23 3242)      Nutrition Assessment:    Chart reviewed; patient continues on a 3 carb choice diet. She reports her appetite is getting better. She likes the ensure high protein shakes and states she drinks them at home. No nutrition questions or concerns reported. She's hopeful to go home today. Nutrition Related Findings:     [phone]   BM 4/28   Xanax, Wellbutrin, Buspar, Celexa, Famotidine, lantus, Humalog, Remeron   Wound Type: Moisture associate skin damage per wound care note    Current Nutrition Intake & Therapies:  Average Meal Intake: 1-25%  Average Supplement Intake: 51-75%  ADULT DIET Regular; 3 carb choices (45 gm/meal)  ADULT ORAL NUTRITION SUPPLEMENT Breakfast, Dinner; Low Calorie/High Protein    Anthropometric Measures:  Height: 5' 4\" (162.6 cm)  Ideal Body Weight (IBW): 120 lbs (55 kg)     Current Body Wt:  97.5 kg (214 lb 15.2 oz), 179.1 % IBW. Current BMI (kg/m2): 36.9                          BMI Category: Obese class 2 (BMI 35.0-39. 9)    Estimated Daily Nutrient Needs:  Energy Requirements Based On: Formula  Weight Used for Energy Requirements: Current  Energy (kcal/day): 2042 kcals (BMR x 1. 3AF)  Weight Used for Protein Requirements: Ideal  Protein (g/day): 82-109g (1.5-2.0 g/kg IBW)  Method Used for Fluid Requirements: 1 ml/kcal  Fluid (ml/day): 2050 mL    Nutrition Diagnosis:   Inadequate protein-energy intake related to cognitive or neurological impairment as evidenced by intake 0-25%    Nutrition Interventions:   Food and/or Nutrient Delivery: Continue current diet, Continue oral nutrition supplement  Nutrition Education/Counseling: No recommendations at this time  Coordination of Nutrition Care: Continue to monitor while inpatient       Goals:  Previous Goal Met: Progressing toward goal(s)  Goals: PO intake 50% or greater, by next RD assessment       Nutrition Monitoring and Evaluation:   Behavioral-Environmental Outcomes: None identified  Food/Nutrient Intake Outcomes: Food and nutrient intake, Supplement intake  Physical Signs/Symptoms Outcomes: Biochemical data, Weight    Discharge Planning:    Continue current diet, Continue oral nutrition supplement    Conda Dakins, RD  Contact: ext [phone]

## 2023-04-28 NOTE — PROGRESS NOTES
PCP hospital follow-up transitional care appointment has been scheduled with Dr. Mandy Kate on 5/2/23 at 1130. Pending patient discharge.   Selam Michel, Care Management Assistant

## 2023-04-28 NOTE — DISCHARGE SUMMARY
Hospitalist Discharge Summary     Patient ID:  Esha Espinosa  [id number]  46 y.o.  1970  4/25/2023    PCP on record: Edda Abrams MD    Admit date: 4/25/2023  Discharge date and time: 4/28/2023    DISCHARGE DIAGNOSIS:    Hypoglycemia  Diabetes mellitus  Acute metabolic encephalopathy from hypoglycemia  DEBORAH  Urine acute UTI  Mild hyponatremia  Acute transaminitis  ongestive heart failure  Chronic respiratory failure due to COPD on 3 L  Depression  PTSD  GERD  Radiculopathy    CONSULTATIONS:  IP CONSULT TO HOSPITALIST  IP CONSULT TO ENDOCRINOLOGY    Excerpted HPI from H&P of Mikel Gilbert MD:  Ubaldo Giraldo is a 47 y.o.  female with PMHx significant for diabetes mellitus, depression, gastroesophageal reflux disease, congestive heart failure is coming the hospital chief complaints of low blood sugar. Patient is a poor historian so information is obtained by talking to patient's dad who is at the bedside. According to him, patient had a recent admission to [address] [place] [address] for diabetic ketoacidosis about 1 month ago. She started having low blood sugars in the last 1 week and often blood pressure blood sugar was dropping into the 30s and 40s for which she received glucagon this morning and EMS was summoned. Patient is currently alert awake, confused and is not able to provide much information so information is limited. According to dad, patient had some confusion but did not really report any symptoms and lives with boyfriend and may have not been eating well and may even have missed some insulin doses. On arrival to ED, noted to have stable vitals. On labs noted to have low blood sugar, hemoglobin 9.9, BMP shows a sodium of 129, glucose of 33, creatinine of 4.12. ALT of 162 and AST of 424. CT abdomen showed IUD in the uterus but no other acute process.   We were asked to admit for work up and evaluation of the above problems    ______________________________________________________________________  DISCHARGE SUMMARY/HOSPITAL COURSE:  for full details see H&P, daily progress notes, labs, consult notes. Patient is admitted with hypoglycemia, DEBORAH and altered mental status from metabolic encephalopathy. Endocrinology was following. Patient's long-acting insulins were held and patient received D10 drip. Discontinued and started on Lantus. Patient was on Lantus 47 at home, endocrine adjusted to Lantus 35 units and Humalog to 4 units twice daily with a sliding scale insulin. Blood sugar mostly controlled currently without any hypoglycemic episodes. Creatinine also improved. Patient completed 3 days of ceftriaxone for possible UTI. No urine culture was sent. LFTs also trended down. Patient on multiple psychiatric medications, reviewed with the patient and he stated she is on all these medications. Asked her to follow-up with her primary which she has an appointment in 1 week to see if she can come off t any of these meds. Mentation improved and patient AO x3. On chronic nasal cannula. Patient ambulatory. Medically stable for discharge all discharge recommendation discussed in detail with the patient and all medications reviewed      Hypoglycemia  Diabetes mellitus uncontrolled  -Patient is coming in with blood sugars in the low 30s at home  -Recent A1c in January was 10.9  -Received multiple doses of dextrose in the emergency department but still hypoglycemic so was started D10 drip initially then it was stopped as per endocrinology recommendations  -Endocrinology input is highly appreciated  - We will discharge on Lantus 35 unit and Humalog 4 unit 3 times daily with sliding scale.   We will follow-up with endocrinologist as outpatient in few weeks     Acute kidney injury  -Baseline creatinine is around 1.5-2 and on admission creatinine is 4.1  -UA with bacteriuria and pyuria  -not on nephrotoxic medications at home  -  - Creatinine improving. Repeat BMP in 5 days   -Held her spironolactone on discharge. May resume as outpatient  Acute urinary tract infection  - UA with bacteriuria and pyuria  - Urine culture? Not sent  - W completed 3 days of ceftriaxone  Blood culture negative     Acute transaminitis  -ALT and AST are both elevated 162 on 424. Total bilirubin is within normal limits  -Patient's father does not report any frequent alcohol use  -Check PT/INR  -CT abdomen reviewed  - Transaminitis trending down     Acute metabolic encephalopathy likely related to hypoglycemia    -CT head with no acute abnormality reported  -MRI no acute stroke  Back to baseline     Mild hyponatremia  Improved           Congestive heart failure  Chronic respiratory failure due to COPD on 3 L  Depression  PTSD  GERD  Radiculopathy  Resumed on home medications. Patient on frexulti and Abilify per her and multiple psych medications. Asked her to follow-up with the primary, to readjust her medications                   _______________________________________________________________________  Patient seen and examined by me on discharge day. Pertinent Findings:  Gen:    Not in distress  Chest: Clear lungs  CVS:   Regular rhythm. No edema  Abd:  Soft, not distended, not tender  Neuro:  Alert,   _______________________________________________________________________  DISCHARGE MEDICATIONS:   Current Discharge Medication List        CONTINUE these medications which have CHANGED    Details   !! insulin glargine (LANTUS) 100 unit/mL injection 35 Units by SubCUTAneous route daily.  Dose change 04/28/23--updated med list--did not send prescription to the pharmacy  Qty: 20 mL, Refills: 11  Start date: 4/28/2023      insulin lispro (HUMALOG) 100 unit/mL injection Inject 4 units before meals + 2 units for every 50 mg/dl above 150 mg/dl--max 65 units/day--Dose change 04/28/23--updated med list--did not send prescription to the pharmacy  Qty: 20 mL, Refills: 11  Start date: 4/28/2023      !! insulin glargine (LANTUS) 100 unit/mL injection Take 35 units  subcutaneous daily  Qty: 1 mL, Refills: 0  Start date: 4/29/2023      insulin lispro (HumaLOG KwikPen Insulin) 100 unit/mL kwikpen 4 unit subcutaneously before meals  Qty: 1 Each, Refills: 0  Start date: 4/28/2023       !! - Potential duplicate medications found. Please discuss with provider. CONTINUE these medications which have NOT CHANGED    Details   esomeprazole (NEXIUM) 40 mg capsule Take 1 Capsule by mouth two (2) times a day. brexpiprazole (REXULTI) 1 mg tab tablet Take 1 Tablet by mouth two (2) times a day. buPROPion XL (WELLBUTRIN XL) 150 mg tablet Take 1 Tablet by mouth daily. ALPRAZolam (XANAX) 2 mg tablet Take 1 Tablet by mouth three (3) times daily. ARIPiprazole (ABILIFY) 30 mg tablet Take 1 Tablet by mouth daily. morphine ER (MS CONTIN) 100 mg CR tablet Take 1 Tablet by mouth every twelve (12) hours. carvediloL (COREG) 6.25 mg tablet Take 1 Tablet by mouth two (2) times daily (with meals). gabapentin (NEURONTIN) 600 mg tablet Take 0.5 Tablets by mouth three (3) times daily. Max Daily Amount: 900 mg. Replaces the 300 mg caps  Qty: 360 Tablet, Refills: 1    Associated Diagnoses: Type 1 diabetes mellitus with nephropathy (HCC)      butalbital-acetaminophen-caffeine (FIORICET, ESGIC) -40 mg per tablet Take 1 Tablet by mouth every six (6) hours as needed for Headache. albuterol (PROVENTIL HFA, VENTOLIN HFA, PROAIR HFA) 90 mcg/actuation inhaler Take 2 Puffs by inhalation every six (6) hours as needed for Shortness of Breath. citalopram (CELEXA) 40 mg tablet Take 1 Tablet by mouth daily. glucagon (Glucagon Emergency Kit, human,) 1 mg solr USE AS DIRECTED IN KIT INSTRUCTIONS  Qty: 2 Each, Refills: 11      naloxone (Narcan) 4 mg/actuation nasal spray Use 1 spray intranasally, then discard.  Repeat with new spray every 2 min as needed for opioid overdose symptoms, alternating nostrils. Qty: 1 Each, Refills: 0      aspirin delayed-release 81 mg tablet Take 1 Tablet by mouth daily. diclofenac (VOLTAREN) 1 % gel Apply 2 g to affected area four (4) times daily as needed for Pain. RT KNEE      oxyCODONE IR (ROXICODONE) 5 mg immediate release tablet Take 1 Tablet by mouth every four (4) hours as needed for Pain. rosuvastatin (CRESTOR) 40 mg tablet Take 1 Tablet by mouth daily. busPIRone (BUSPAR) 15 mg tablet Take 1 Tablet by mouth three (3) times daily. fluticasone propionate (FLONASE) 50 mcg/actuation nasal spray 2 Sprays by Both Nostrils route daily as needed for Rhinitis. Qty: 16 g, Refills: 3      bumetanide (BUMEX) 2 mg tablet Take 1 Tablet by mouth two (2) times a day. levonorgestreL (MIRENA) 20 mcg/24 hours (7 yrs) 52 mg IUD 1 Device by IntraUTERine route once. nystatin (MYCOSTATIN) powder Apply  to affected area. STOP taking these medications       insulin glargine (LANTUS,BASAGLAR) 100 unit/mL (3 mL) inpn Comments:   Reason for Stopping:         spironolactone (ALDACTONE) 25 mg tablet Comments:   Reason for Stopping:         famotidine (PEPCID) 40 mg tablet Comments:   Reason for Stopping:         metoclopramide HCl (REGLAN) 5 mg tablet Comments:   Reason for Stopping:         mirtazapine (REMERON) 15 mg tablet Comments:   Reason for Stopping:                 Patient Follow Up Instructions: Activity: Activity as tolerated  Diet: Diabetic Diet  Wound Care: As directed    Follow-up with PCP in 1 week.   Endocrine in 2 weeks in   Follow-up tests/labs CBC BMP in 1 week monitor blood sugar 3 times daily    Follow-up Information       Follow up With Specialties Details Why Contact Info    Alverto Goodson MD Family Medicine Go on 5/2/2023 at 11:30am for your PCP hospital follow up. [address]  [phone]      Abdirizak Du NP Nurse Practitioner   [address] [place] [address] [facility]  [phone]            ________________________________________________________________    Risk of deterioration: Moderate    Condition at Discharge:  Stable  __________________________________________________________________    Disposition  Home with family, no needs    ____________________________________________________________________    Code Status: Full Code  ___________________________________________________________________      Total time in minutes spent coordinating this discharge (includes going over instructions, follow-up, prescriptions, and preparing report for sign off to her PCP) : 50minutes    Signed:  Matti Duran MD

## 2023-04-28 NOTE — PROGRESS NOTES
Pharmacy Medication Reconciliation     The patient was interviewed regarding current PTA medication list, use and drug allergies. The patient was questioned regarding use of any other inhalers, topical products, over the counter medications, herbal medications, vitamin products or ophthalmic/nasal/otic medication use. Allergy Update: Metolazone, Zocor [simvastatin], Lisinopril, and Medrol [methylprednisolone]    Recommendations/Findings: The following amendments were made to the patient's active medication list on file at [facility]:   1) Additions: esomeprazole and spironolactone    2) Deletions: atorvastatin, gabapentin 300 mg capsules, and omeprazole    3) Changes: bupropion formulation changed (XL instead of SR), insulin glargine reduced to 47 units per last endocrinology office visit note, sig entered for alprazolam and aripiprazole    Pertinent Findings: Patient was unable to answer questions when med rec was conducted on 4/26. Spoke to her father, Bina Espinosa, over the telephone. He promised to bring in her non-controlled home medications for review with this writer as there have been several recent changes. Medications were not brought from home. All information entered based on conversation with patient's father, review of Rx Query, and chart review.    -Clarified PTA med list with patient's father. PTA medication list was corrected to the following:   Prior to Admission Medications   Prescriptions Last Dose Informant Patient Reported? Taking? ALPRAZolam (XANAX) 2 mg tablet 4/25/2023 Other, Parent Yes Yes   Sig: Take 1 Tablet by mouth three (3) times daily. ARIPiprazole (ABILIFY) 30 mg tablet 4/25/2023 Other, Parent Yes Yes   Sig: Take 1 Tablet by mouth daily. albuterol (PROVENTIL HFA, VENTOLIN HFA, PROAIR HFA) 90 mcg/actuation inhaler 3/25/2023 Self, Other Yes Yes   Sig: Take 2 Puffs by inhalation every six (6) hours as needed for Shortness of Breath.    aspirin delayed-release 81 mg tablet 4/24/2023 Self, Other, Parent Yes Yes   Sig: Take 1 Tablet by mouth daily. brexpiprazole (REXULTI) 1 mg tab tablet  Other, Parent Yes Yes   Sig: Take 1 Tablet by mouth two (2) times a day. buPROPion XL (WELLBUTRIN XL) 150 mg tablet  Other, Parent Yes Yes   Sig: Take 1 Tablet by mouth daily. bumetanide (BUMEX) 2 mg tablet 2023 Self, Other, Parent Yes Yes   Sig: Take 1 Tablet by mouth two (2) times a day. busPIRone (BUSPAR) 15 mg tablet 2023 Self, Other, Parent Yes Yes   Sig: Take 1 Tablet by mouth three (3) times daily. butalbital-acetaminophen-caffeine (FIORICET, ESGIC) -40 mg per tablet 3/25/2023 Self, Other Yes Yes   Sig: Take 1 Tablet by mouth every six (6) hours as needed for Headache.   carvediloL (COREG) 6.25 mg tablet 2023 Other Yes Yes   Sig: Take 1 Tablet by mouth two (2) times daily (with meals). citalopram (CELEXA) 40 mg tablet 2023 Self, Other, Parent Yes Yes   Sig: Take 1 Tablet by mouth daily. diclofenac (VOLTAREN) 1 % gel 3/25/2023 Self, Other Yes Yes   Sig: Apply 2 g to affected area four (4) times daily as needed for Pain. RT KNEE   esomeprazole (NEXIUM) 40 mg capsule  Other, Parent Yes Yes   Sig: Take 1 Capsule by mouth two (2) times a day. famotidine (PEPCID) 40 mg tablet 2023 Self, Other, Parent Yes Yes   Sig: Take 1 Tablet by mouth daily. fluticasone propionate (FLONASE) 50 mcg/actuation nasal spray 2023 Self, Other No Yes   Si Sprays by Both Nostrils route daily as needed for Rhinitis.   gabapentin (NEURONTIN) 600 mg tablet 2023 Other, Parent No Yes   Sig: Take 0.5 Tablets by mouth three (3) times daily. Max Daily Amount: 900 mg. Replaces the 300 mg caps   glucagon (Glucagon Emergency Kit, human,) 1 mg solr 2023 Self, Other, Parent No Yes   Sig: USE AS DIRECTED IN KIT INSTRUCTIONS   insulin glargine (LANTUS) 100 unit/mL injection 2023 Other No No   Si Units by SubCUTAneous route daily.    insulin glargine (LANTUS,BASAGLAR) 100 unit/mL (3 mL) inpn  Other Yes Yes   Si Units by SubCUTAneous route daily. insulin lispro (HUMALOG) 100 unit/mL injection 2023 Other No No   Sig: Inject 10 units before meals + 2 units for every 50 mg/dl above 150 mg/dl--max 65 units/day   insulin lispro (HUMALOG) 100 unit/mL kwikpen  Other Yes Yes   Sig: 10 Units Before breakfast, lunch, and dinner. Add 2 units for every BG reading 50 above 150. levonorgestreL (MIRENA) 20 mcg/24 hours (7 yrs) 52 mg IUD 2023 Self, Other, Parent Yes Yes   Si Device by IntraUTERine route once. metoclopramide HCl (REGLAN) 5 mg tablet 2023 Self, Other No Yes   Sig: Take 1 Tablet by mouth two (2) times a day. mirtazapine (REMERON) 15 mg tablet 2023 Self, Other Yes Yes   Sig: Take 1 Tablet by mouth daily. morphine ER (MS CONTIN) 100 mg CR tablet 2023 Other, Parent Yes Yes   Sig: Take 1 Tablet by mouth every twelve (12) hours. naloxone (Narcan) 4 mg/actuation nasal spray 2023 Self, Other No Yes   Sig: Use 1 spray intranasally, then discard. Repeat with new spray every 2 min as needed for opioid overdose symptoms, alternating nostrils. nystatin (MYCOSTATIN) powder Unknown Self, Other Yes No   Sig: Apply  to affected area. oxyCODONE IR (ROXICODONE) 5 mg immediate release tablet 2023 Self, Other, Parent Yes Yes   Sig: Take 1 Tablet by mouth every four (4) hours as needed for Pain. rosuvastatin (CRESTOR) 40 mg tablet 2023 Self, Other, Parent Yes Yes   Sig: Take 1 Tablet by mouth daily. spironolactone (ALDACTONE) 25 mg tablet  Other Yes Yes   Sig: Take 1 Tablet by mouth daily.       Facility-Administered Medications: None        Thank you,  Santiago Alaniz, PHARMD

## 2023-04-28 NOTE — DISCHARGE INSTRUCTIONS
Dr. Elam Hint discharge instructions:    1) Decrease Lantus to 35 units every morning. 2) Dose your humalog based on the scale below if you are eating:  Blood sugar                 Insulin dose                                                                                           Less than 90               Eat first, take 3 units                                                  4 units                         151-200                       6 units                                                 201-250                       8 units                                     251-300                       10 units                                                 301-350                       12 units                                                 351-400                       14 units  401-450                       16 units  451-500                       18 units  Over 500                      20 units     3) If your sugar is over 200 and you are not eating, you can dose humalog on the scale below:  Blood sugar                 Insulin dose                                                                                           201-250                       4 units                            251-300                       6 units                                        301-350                       8 units                                        351-400                       10 units  401-450                       12 units  451-500                       14 units  Over 500                     16 units    4) Our office will contact you for a follow up visit in the next few weeks. Don't hesitate to call [phone] with any questions.

## 2023-04-28 NOTE — PROGRESS NOTES
1900: Bedside and Verbal shift change report given to Daniela Lindo RN (oncoming nurse) by Kenzie Merino RN (offgoing nurse). Report included the following information SBAR, Kardex, MAR, and Recent Results. 7842: chew taken out.      0700: Bedside and Verbal shift change report given to Alicja Peres RN (oncoming nurse) by Daniela Lindo RN (offgoing nurse). Report included the following information SBAR, Kardex, MAR, and Recent Results. Problem: Falls - Risk of  Goal: *Absence of Falls  Description: Document Harjeet Basye Fall Risk and appropriate interventions in the flowsheet.   Outcome: Progressing Towards Goal  Note: Fall Risk Interventions:       Bed alarm  Video monitor              Problem: Patient Education: Go to Patient Education Activity  Goal: Patient/Family Education  Outcome: Progressing Towards Goal

## 2023-04-28 NOTE — PROGRESS NOTES
Problem: Self Care Deficits Care Plan (Adult)  Goal: *Acute Goals and Plan of Care (Insert Text)  Description: FUNCTIONAL STATUS PRIOR TO ADMISSION: Patient was independent and active without use of DME. She does have a RW and did use it after the last admission for a while. HOME SUPPORT: The patient lived with boyfriend but did not require assist.    Occupational Therapy Goals  Initiated 4/28/2023  1. Patient will perform grooming at the sink with independence within 7 day(s). 2.  Patient will perform bathing at the sink with independence within 7 day(s). 3.  Patient will perform lower body dressing with independence within 7 day(s). 4.  Patient will perform toilet transfers with independence within 7 day(s). 5.  Patient will perform all aspects of toileting with independence within 7 day(s). Outcome: Not Met  OCCUPATIONAL THERAPY EVALUATION  Patient: Trenton Smith (18 y.o. female)  Date: 4/28/2023  Primary Diagnosis: DEBORAH (acute kidney injury) ([facility] [address] [N17.9]  Hypoglycemia [E16.2]       Precautions:   Fall (@ L O2 baseline)    ASSESSMENT  Based on the objective data described below, the patient presents with decreased functional mobility, ADLs, endurance and strength. Pt stated that she was living at home with boyfriend, and she was independent ADLs. Pt was supine in bed and able to stand and walk to bathroom with RW due to weakness. Pt is able to reach to pull up her socks, and stated that she will be able to bathe and dress self at home. She is weak but moving well and all VSS. Current Level of Function Impacting Discharge (ADLs/self-care): SBA to CGA for ADLs     Functional Outcome Measure: The patient scored 75/100 on the Barthel outcome measure which is indicative of SBA to CGA . Patient will benefit from skilled therapy intervention to address the above noted impairments.        PLAN :  Recommendations and Planned Interventions: self care training, functional mobility training, therapeutic exercise, balance training, therapeutic activities, endurance activities, patient education, home safety training, and family training/education    Frequency/Duration: Patient will be followed by occupational therapy 3 times a week to address goals. Recommendation for discharge: (in order for the patient to meet his/her long term goals)  No skilled occupational therapy/ follow up rehabilitation needs identified at this time. This discharge recommendation:  Has been made in collaboration with the attending provider and/or case management    IF patient discharges home will need the following DME: none       SUBJECTIVE:   Patient stated I have a rolling walker at home.  Margaret Yee    OBJECTIVE DATA SUMMARY:   HISTORY:   Past Medical History:   Diagnosis Date    Achilles tendon rupture     along with torn right patellar/femoral ligament    Arthritis     rt. foot    Chronic kidney disease     Chronic pain     abdominal pain    Diabetes (HCC)     IDDM on insulin pump and sensor    DM type 1 (diabetes mellitus, type 1) ([facility] [address]     age 25    Endometriosis     s/p ex-lap 4x    Gastric ulcer     GERD (gastroesophageal reflux disease)     Herpes     Other and unspecified hyperlipidemia     Panic attacks     Psychiatric disorder     anxiety, panic attacks    PTSD (post-traumatic stress disorder)     Unspecified essential hypertension      Past Surgical History:   Procedure Laterality Date    HX COLONOSCOPY      HX GYN      2 d&c's    HX GYN      laparoscopies x5 for endometriosis    HX GYN      mirena in place    HX ORTHOPAEDIC  2002    achiles tendon repair,talus repair    HX ORTHOPAEDIC      injections x2 to right shoulder    HX ORTHOPAEDIC Left 02/07/2019    3rd trigger finger release    [place] UNLISTED PROCEDURE CARDIAC SURGERY         Expanded or extensive additional review of patient history:     Home Situation  Home Environment: Private residence  # Steps to Enter: 3  Rails to Enter: Yes  [employer] : Right  One/Two Story Residence: One story  Living Alone: No  Support Systems: Spouse/Significant Other (Pt's boyfriend is her main support system)  Patient Expects to be Discharged to[de-identified] Home (Pt is d/c Home with follow up appts pending PT/OT recommendations)  Current DME Used/Available at Home: Walker, rolling, Oxygen, portable  Tub or Shower Type: Tub/Shower combination    Hand dominance: Right    EXAMINATION OF PERFORMANCE DEFICITS:  Cognitive/Behavioral Status:  Neurologic State: Lethargic (but did wake up)  Orientation Level: Oriented X4  Cognition: Follows commands  Perception: Appears intact  Perseveration: No perseveration noted  Safety/Judgement: Awareness of environment    Skin: in fair health     Edema: none     Hearing: Auditory  Auditory Impairment: None    Vision/Perceptual:                    Intact                  Range of Motion:    AROM: Generally decreased, functional                         Strength:    Strength: Generally decreased, functional                Coordination:     Fine Motor Skills-Upper: Left Intact; Right Intact    Gross Motor Skills-Upper: Left Intact; Right Intact    Tone & Sensation:              intact                Balance:  Sitting: Intact  Standing: Impaired  Standing - Static: Constant support;Good (fair without support)  Standing - Dynamic : Constant support; Fair    Functional Mobility and Transfers for ADLs:  Bed Mobility:  Rolling: Modified independent  Supine to Sit: Supervision  Scooting: Supervision    Transfers:  Sit to Stand: Contact guard assistance  Stand to Sit: Contact guard assistance  Bed to Chair: Contact guard assistance;Stand-by assistance  Bathroom Mobility: Contact guard assistance;Stand-by assistance  Toilet Transfer : Contact guard assistance;Stand-by assistance    ADL Assessment:     Pt is CGA to SBA for ADLs need to work on standing endurance             Type of Bath: Chlorhexidine (CHG); Basin/Soap/Water            Type of Bath: Chlorhexidine (CHG); Basin/Soap/Water         Cognitive Retraining  Safety/Judgement: Awareness of environment      Functional Measure:    Barthel Index:  Bathin  Bladder: 10  Bowels: 10  Groomin  Dressing: 10  Feeding: 10  Mobility: 10  Stairs: 0  Toilet Use: 5  Transfer (Bed to Chair and Back): 15  Total: 75/100      The Barthel ADL Index: Guidelines  1. The index should be used as a record of what a patient does, not as a record of what a patient could do. 2. The main aim is to establish degree of independence from any help, physical or verbal, however minor and for whatever reason. 3. The need for supervision renders the patient not independent. 4. A patient's performance should be established using the best available evidence. Asking the patient, friends/relatives and nurses are the usual sources, but direct observation and common sense are also important. However direct testing is not needed. 5. Usually the patient's performance over the preceding 24-48 hours is important, but occasionally longer periods will be relevant. 6. Middle categories imply that the patient supplies over 50 per cent of the effort. 7. Use of aids to be independent is allowed. Score Interpretation (from [address] [address])    Independent   60-79 Minimally independent   40-59 Partially dependent   20-39 Very dependent   <20 Totally dependent     -Luz Marina [place]., Barthel, D.W. (1965). Functional evaluation: the Barthel Index. [facility] ([address] [facility] (1997). The Barthel activities of daily living index: self-reporting versus actual performance in the old (> or = 75 years). Journal of [address] [place] [address] 45(7), [facility], ..., Mary Casarez., Pantera Herron. (1999). Measuring the change in disability after inpatient rehabilitation; comparison of the responsiveness of the Barthel Index and Functional [place] Measure.  Journal of Neurology, Neurosurgery, and Psychiatry, 66(4), [phone]. LOUISA Ortiz, JOSÉ LUIS Soares, & Shane Yeh M.A. (2004) Assessment of post-stroke quality of life in cost-effectiveness studies: The usefulness of the Barthel Index and the EuroQoL-5D. Quality of Life Research, 15, 793-60         Occupational Therapy Evaluation Charge Determination   History Examination Decision-Making   LOW Complexity : Brief history review  LOW Complexity : 1-3 performance deficits relating to physical, cognitive , or psychosocial skils that result in activity limitations and / or participation restrictions  LOW Complexity : No comorbidities that affect functional and no verbal or physical assistance needed to complete eval tasks       Based on the above components, the patient evaluation is determined to be of the following complexity level: LOW   Pain Rating:  none    Activity Tolerance:   Fair    After treatment patient left in no apparent distress:    Sitting in chair, Call bell within reach, and Bed / chair alarm activated    COMMUNICATION/EDUCATION:   The patients plan of care was discussed with: Physical therapist and Registered nurse. Patient/family agree to work toward stated goals and plan of care. This patients plan of care is appropriate for delegation to [facility].     Thank you for this referral.  Caorlyn Beckman, OT  Time Calculation: 23 mins

## 2023-04-28 NOTE — PROGRESS NOTES
0700. Bedside shift change report given to Hudson (oncoming nurse) by Cindi Vela (offgoing nurse). Report included the following information SBAR, Kardex, ED Summary, Intake/Output, MAR, Recent Results, and Cardiac Rhythm NSR .     1530. Patient discharged, All discharge instructions read to patient, all paperwork signed, all perscriptions including narcotics returned to patient from pharmacy, Narcotics count verified with patient and Will Akhil form signed by RN and patient.  Patient to be contacted after discharge regarding home health services per patient request.

## 2023-04-28 NOTE — PROGRESS NOTES
Problem: Mobility Impaired (Adult and Pediatric)  Goal: *Acute Goals and Plan of Care (Insert Text)  Description: FUNCTIONAL STATUS PRIOR TO ADMISSION: Patient was independent and active without use of DME. States she has a walker and used it some at home after last admission    [facility]: The patient lived with boyfriend but did not require assist.    Physical Therapy Goals  Initiated 4/28/2023  1. Patient will move from supine to sit and sit to supine  in bed with modified independence within 7 day(s). 2.  Patient will transfer from bed to chair and chair to bed with modified independence using the least restrictive device within 7 day(s). 3.  Patient will perform sit to stand with modified independence within 7 day(s). 4.  Patient will ambulate with modified independence for 200 feet with the least restrictive device within 7 day(s). 5.  Patient will ascend/descend 4 stairs with 1 handrail(s) with modified independence within 7 day(s). Outcome: Progressing Towards Goal   PHYSICAL THERAPY EVALUATION  Patient: Ilya Howard (89 y.o. female)  Date: 4/28/2023  Primary Diagnosis: DEBORAH (acute kidney injury) ([facility] [address] [N17.9]  Hypoglycemia [E16.2]       Precautions:   Fall (@ L O2 baseline)    ASSESSMENT  Based on the objective data described below, the patient presents with decreased functional mobility from baseline level of function. Patient currently limited by impaired balance, gait instability, generalized weakness and decreased activity tolerance. Currently needing supervision to come to EOB and and CGA for transfers. Initially amb without RW and needs Gail for balance. Once using RW needs only CGA. Patient will likely be safe to DC home with [facility] PT follow up. Other factors to consider for discharge: at risk for falls, below baseline, requires physical assistance at times     Patient will benefit from skilled therapy intervention to address the above noted impairments. PLAN :  Recommendations and Planned Interventions: bed mobility training, transfer training, gait training, therapeutic exercises, neuromuscular re-education, patient and family training/education, and therapeutic activities      Frequency/Duration: Patient will be followed by physical therapy:  3 times a week to address goals. Recommendation for discharge: (in order for the patient to meet his/her long term goals)  Physical therapy at least 2 days/week in the home         IF patient discharges home will need the following DME: patient owns DME required for discharge         SUBJECTIVE:   Patient stated I have been in the bed for too long. This is my first time up.     OBJECTIVE DATA SUMMARY:   HISTORY:    Past Medical History:   Diagnosis Date    Achilles tendon rupture     along with torn right patellar/femoral ligament    Arthritis     rt. foot    Chronic kidney disease     Chronic pain     abdominal pain    Diabetes (HCC)     IDDM on insulin pump and sensor    DM type 1 (diabetes mellitus, type 1) ([facility] [address]     age 25    Endometriosis     s/p ex-lap 4x    Gastric ulcer     GERD (gastroesophageal reflux disease)     Herpes     Other and unspecified hyperlipidemia     Panic attacks     Psychiatric disorder     anxiety, panic attacks    PTSD (post-traumatic stress disorder)     Unspecified essential hypertension      Past Surgical History:   Procedure Laterality Date    HX COLONOSCOPY      HX GYN      2 d&c's    HX GYN      laparoscopies x5 for endometriosis    HX GYN      mirena in place    HX ORTHOPAEDIC  2002    achiles tendon repair,talus repair    HX ORTHOPAEDIC      injections x2 to right shoulder    HX ORTHOPAEDIC Left 02/07/2019    3rd trigger finger release    [place] UNLISTED PROCEDURE CARDIAC SURGERY         Personal factors and/or comorbidities impacting plan of care:     Home Situation  Home Environment: Private residence  # Steps to Enter: 3  Rails to Enter: Yes  Hand Rails : Right  One/Two Story Residence: One story  Living Alone: No  Support Systems: Spouse/Significant Other (Pt's boyfriend is her main support system)  Patient Expects to be Discharged to[de-identified] Home (Pt is d/c Home with follow up appts pending PT/OT recommendations)  Current DME Used/Available at Home: Walker, rolling, Oxygen, portable  Tub or Shower Type: Tub/Shower combination    EXAMINATION/PRESENTATION/DECISION MAKING:   Critical Behavior:  Neurologic State: Alert  Orientation Level: Oriented X4 (confused occasionally)  Cognition: Decreased attention/concentration, Impulsive     Hearing: Auditory  Auditory Impairment: None    Range Of Motion:  AROM: Generally decreased, functional                       Strength:    Strength: Generally decreased, functional             Functional Mobility:  Bed Mobility:  Rolling: Modified independent  Supine to Sit: Supervision     Scooting: Supervision  Transfers:  Sit to Stand: Contact guard assistance  Stand to Sit: Contact guard assistance                       Balance:   Sitting: Intact  Standing: Impaired  Standing - Static: Constant support;Good (fair without support)  Standing - Dynamic : Constant support; Fair  Ambulation/Gait Training:  Distance (ft): 20 Feet (ft)  Assistive Device: Gait belt;Walker, rolling  Ambulation - Level of Assistance: Minimal assistance (without RW, CGA with RW)     Gait Description (WDL): Exceptions to WDL  Gait Abnormalities: Decreased step clearance;Shuffling gait; Path deviations        Base of Support: Widened;Center of gravity altered     Speed/Sheba: Slow;Shuffled;Pace decreased (<100 feet/min)  Step Length: Left shortened;Right shortened          Pain Rating:  No c/o pain    Activity Tolerance:   Fair and requires rest breaks    After treatment patient left in no apparent distress:   Sitting in chair, Call bell within reach, and Caregiver / family present    COMMUNICATION/EDUCATION:   The patients plan of care was discussed with: Physical therapist, Occupational therapist, and Registered nurse. Fall prevention education was provided and the patient/caregiver indicated understanding., Patient/family have participated as able in goal setting and plan of care. , and Patient/family agree to work toward stated goals and plan of care.     Thank you for this referral.  Bridgette Hunter, PT, DPT   Time Calculation: 23 mins

## 2023-04-30 LAB
BACTERIA SPEC CULT: NORMAL
BACTERIA SPEC CULT: NORMAL
SERVICE CMNT-IMP: NORMAL
SERVICE CMNT-IMP: NORMAL

## 2023-05-01 ENCOUNTER — PATIENT OUTREACH (OUTPATIENT)
Dept: CASE MANAGEMENT | Age: 53
End: 2023-05-01

## 2023-05-01 NOTE — PROGRESS NOTES
Care Transitions Initial Call    Call within 2 business days of discharge: Yes     Patient Current Location: [place]    Patient: Vivienne Smiley Patient : 1970 MRN: [id number]    Last Discharge  [address]       Date Complaint Diagnosis Description Type Department Provider    23 Low Blood Sugar Acute renal failure, unspecified acute renal failure type ([facility] [address] . .. ED to Hosp-Admission (Discharged) (ADMIT) [id number] Tam Manzo MD; Magdi Pollock... Was this an external facility discharge? No [facility]    Challenges to be reviewed by the provider   Additional needs identified to be addressed with provider: no             Method of communication with provider : none    Discussed COVID-19 related testing which was not done at this time. Advance Care Planning:   Does patient have an Advance Directive: yes, reviewed and current. Inpatient Readmission Risk score: Unplanned Readmit Risk Score: 19.8    Was this a readmission? no   Patient stated reason for the admission: \" low blood sugar\"    Patients top risk factors for readmission: medical condition-DM    Interventions to address risk factors: Scheduled appointment with PCP-2023    Care Transition Nurse (CTN) contacted the patient by telephone to perform post hospital discharge assessment. Verified name and  with patient as identifiers. Provided introduction to self, and explanation of the CTN role. CTN reviewed discharge instructions, medical action plan and red flags with patient who verbalized understanding. Were discharge instructions available to patient? yes. Reviewed appropriate site of care based on symptoms and resources available to patient including: PCP, Specialist, and When to call 911. Patient given an opportunity to ask questions and does not have any further questions or concerns at this time. The patient agrees to contact the PCP office for questions related to their healthcare.      Medication reconciliation was performed with patient, who verbalizes understanding of administration of home medications. Advised obtaining a 90-day supply of all daily and as-needed medications. Referral to Pharm D needed: no     Home Health/Outpatient orders at discharge: home health care  [address]: [facility]   Date of initial visit: TBA    Durable Medical Equipment ordered at discharge: None    Was patient discharged with a pulse oximeter? no    Discussed follow-up appointments. If no appointment was previously scheduled, appointment scheduling offered: yes. Is follow up appointment scheduled within 7 days of discharge? yes. [address] follow up appointment(s): No future appointments. [facility] follow up appointment(s): PCP 5/2/2023    Plan for follow-up call in 5-7 days based on severity of symptoms and risk factors. Plan for next call: self management-DM  CTN provided contact information for future needs. Goals Addressed                   This Visit's Progress     Attends follow-up appointments as directed. 5/1/2023  -Will follow up with PCP on 5/2/2023  -CTN to follow up in 1 week. SP       Prevent complications post hospitalization. 5/1/2023  -Will take blood glucose AC/HS.  Will record and take to PCP office.  -Will take :  insulin lispro (HUMALOG) 100 unit/mL injection Inject 4 units before meals + 2 units for every 50 mg/dl above 150 mg/dl--max 65 units/day     insulin glargine (LANTUS) 100 unit/mL injection Take 35 units  subcutaneous daily   -CTN to follow up in 1 week  SP

## 2023-05-03 ENCOUNTER — TELEPHONE (OUTPATIENT)
Dept: ENDOCRINOLOGY | Age: 53
End: 2023-05-03

## 2023-05-03 RX ORDER — INSULIN GLARGINE 100 [IU]/ML
38 INJECTION, SOLUTION SUBCUTANEOUS DAILY
Qty: 20 ML | Refills: 11 | Status: SHIPPED | OUTPATIENT
Start: 2023-05-03

## 2023-05-03 RX ORDER — INSULIN LISPRO 100 [IU]/ML
INJECTION, SOLUTION INTRAVENOUS; SUBCUTANEOUS
Qty: 20 ML | Refills: 11 | Status: SHIPPED | OUTPATIENT
Start: 2023-05-03

## 2023-05-09 ENCOUNTER — OFFICE VISIT (OUTPATIENT)
Age: 53
End: 2023-05-09
Payer: MEDICARE

## 2023-05-09 VITALS
WEIGHT: 204.2 LBS | HEART RATE: 114 BPM | BODY MASS INDEX: 34.86 KG/M2 | DIASTOLIC BLOOD PRESSURE: 58 MMHG | HEIGHT: 64 IN | SYSTOLIC BLOOD PRESSURE: 103 MMHG

## 2023-05-09 DIAGNOSIS — R94.6 ABNORMAL RESULTS OF THYROID FUNCTION STUDIES: ICD-10-CM

## 2023-05-09 DIAGNOSIS — E10.21 TYPE 1 DIABETES MELLITUS WITH DIABETIC NEPHROPATHY (HCC): Primary | ICD-10-CM

## 2023-05-09 DIAGNOSIS — R79.89 ELEVATED LFTS: ICD-10-CM

## 2023-05-09 DIAGNOSIS — E78.2 MIXED HYPERLIPIDEMIA: ICD-10-CM

## 2023-05-09 DIAGNOSIS — I10 ESSENTIAL (PRIMARY) HYPERTENSION: ICD-10-CM

## 2023-05-09 PROCEDURE — 99214 OFFICE O/P EST MOD 30 MIN: CPT | Performed by: INTERNAL MEDICINE

## 2023-05-09 PROCEDURE — 3074F SYST BP LT 130 MM HG: CPT | Performed by: INTERNAL MEDICINE

## 2023-05-09 PROCEDURE — 3078F DIAST BP <80 MM HG: CPT | Performed by: INTERNAL MEDICINE

## 2023-05-09 PROCEDURE — 3046F HEMOGLOBIN A1C LEVEL >9.0%: CPT | Performed by: INTERNAL MEDICINE

## 2023-05-09 RX ORDER — INSULIN GLARGINE 100 [IU]/ML
38 INJECTION, SOLUTION SUBCUTANEOUS DAILY
COMMUNITY

## 2023-05-09 NOTE — PROGRESS NOTES
restarted by Dr. Marciano Adames in 12/17 and 42 in 4/18. Up to 94 in 8/18. Down to 79 in 1/19 and 88 in 9/19 and 78 in 12/19 and 61 in 7/20 and 54 in 1/21. Changed to crestor 40 mg sometime in the spring of 2021 and LDL 86 in 11/21 and 54 in 3/22 and 52 in 6/22 and 53 in 1/23 and 33 in 5/23  - cont crestor 40 mg daily  - check lipids in fall 2023      4) Elevated LFTs (790.6): I told her previously that her LFTs were elevated likely due to ETOH intake so I advised her to cut back on this and her repeat LFTs were normal in 3/13 and 1/14 and 4/14 and 10/14 and 1/15 and 12/15 and 3/16. AST up to 76 in 2/17 but back to normal in 5/17 and has been normal ever since so will follow this. 5) Borderline abnormal TFT: TSH 2.1 in 12/11 but up to 4.01 in 11/12 and 4.75 in 11/17 during 2 hospital stays. Up to 5.45 in 1/19 with Dr. Marciano Adames. Repeat TSH 1.24 and FT4 0.95 and TPO ab 21 in 2/19 so held on any treatment. TSH up to 3.36 in 9/19 but down to 2.61 in 12/19 and 1.59 in 7/19 and 1.48 in 7/21 and 2.88 in 11/21. Up to 4.1 in 9/22. TSH 2.97, TPO ab <9 and TG ab < 1.0 in 12/22. TSH 2.7 in 1/23 and 3.1 in 5/23  - check TSH in fall 2023    Patient Instructions   1) While you are on the augmentin, I recommend increasing your lantus to 42 units so go home and take another dose of 4 units of lantus and then tomorrow take 42 units and stay on this until the antibiotics are done and then go back to 38 units in the morning.     2) Dose your humalog based on the scale below if you are eating:  Blood sugar  Insulin dose          Less than 90  Eat first, 8 units       10 units    151-200  12 units      201-250  14 units     251-300  16 units      301-350  18 units      351-400  20 units  401-450  22 units  451-500  24 units  Over 500  26 units    3) If your sugar is over 200 and you are not eating, you can dose humalog on the scale below:  Blood sugar  Insulin dose          201-250  4 units     251-300  6

## 2023-05-09 NOTE — PATIENT INSTRUCTIONS
1) While you are on the augmentin, I recommend increasing your lantus to 42 units so go home and take another dose of 4 units of lantus and then tomorrow take 42 units and stay on this until the antibiotics are done and then go back to 38 units in the morning.     2) Dose your humalog based on the scale below if you are eating:  Blood sugar  Insulin dose          Less than 90  Eat first, 8 units       10 units    151-200  12 units      201-250  14 units     251-300  16 units      301-350  18 units      351-400  20 units  401-450  22 units  451-500  24 units  Over 500  26 units    3) If your sugar is over 200 and you are not eating, you can dose humalog on the scale below:  Blood sugar  Insulin dose          201-250  4 units     251-300  6 units      301-350  8 units      351-400  10 units  401-450  12 units  451-500  14 units  Over 500  16 units

## 2023-05-17 RX ORDER — FLURBIPROFEN SODIUM 0.3 MG/ML
SOLUTION/ DROPS OPHTHALMIC
Qty: 200 EACH | Refills: 11 | Status: SHIPPED | OUTPATIENT
Start: 2023-05-17

## 2023-05-22 RX ORDER — BLOOD SUGAR DIAGNOSTIC
STRIP MISCELLANEOUS
Qty: 300 EACH | Refills: 3 | Status: SHIPPED | OUTPATIENT
Start: 2023-05-22

## 2023-05-22 NOTE — TELEPHONE ENCOUNTER
Pt LVM at 12:02 PM stating she needs an updated RX for test strips stating she tests ten (10) times daily. Pt says she currently receives 200 test strips per month and she runs out too early. Pt states she uses AccuChek Guide test strips. Explained I may require a prior authorization but will pass the message to Dr Jennifer Catherine.

## 2023-05-26 ENCOUNTER — TELEPHONE (OUTPATIENT)
Age: 53
End: 2023-05-26

## 2023-05-26 NOTE — TELEPHONE ENCOUNTER
PA initiated through [employer] for  [employer]k Guide test strips     AccuChek Guide test strips quantity of 300 strips per month approved 02/24/2023 - 05/25/2024 PA #: [id number].  Pharmacy notified via fax

## 2023-05-26 NOTE — TELEPHONE ENCOUNTER
Pt LVM at 8:47 AM stating she needs a refill of pen needles. Priyank Morel and was told by pharmacist that the pen needles are ready to be picked up at no cost to the pt. LVM letting Ms Travis First know that she can  her prescription.

## 2023-05-26 NOTE — PATIENT INSTRUCTIONS
1) Current settings are as follows: 
- basal: 12a: 1.55, 4:30a: 1.65, 10p: 1.55 
- Carb ratio: 15 
- sensitivity: 20 
- target: 12a: 130-150, 6a: 100-130, 10p: 130-150 
- active insulin time: 3 hours I made your carb ratio less aggressive at 15 to help with lows after eating. 2) Your BUN and creatinine are markers of kidney function. Your creatinine was 1.15 up slightly from 1.0 at last check. 3) Please come for a follow up visit on 7/20/21 at 12/10pm in our [place] office. 4) I put an order directly into the [facility] system to repeat your labs in the 1-2 weeks prior to your next visit so just ask for the order under my name and you will receive a courtesy reminder through [employer] to have these drawn. CC: Prenatal visit    Sophie Ashraf is a 34 y.o.  at 33w0d.  Doing well.  Denies N/V, dysuria, abnormal vaginal d/c, headaches, heartburn, constipation, cramping, regular contractions, LOF, or VB.  Reports good FM.    /70   Wt 88 kg (194 lb)   LMP 10/11/2022 (Within Days)   BMI 31.31 kg/m²   SVE: Deferred     Fetal Heart Rate: 153    Growth Scan for hyperthyroidism: Cephalic. JENNIE 18.75cm. Anterior placenta. EFW 2276 g (5lb), AC 54.7%, BPD 67% HC 46.7%, FL 84.4%, HL 94.1%       Problems (from 22 to present)     Problem Noted Resolved    Supervision of high risk pregnancy in third trimester 2023 by Jeanette Stevens APRN No    Overview Signed 2023  1:50 PM by Jeanette Stevens APRN     Low risk NIPS female,  Normal Carrier screening  Needs postpartum Pap           Hyperthyroidism affecting pregnancy in third trimester 2022 by Angelia Murcia APRN No    Overview Addendum 2023  4:05 PM by Garima Ross MD     Graves disease  - stopped methimazole 2.5mg in October. TSH 0.874  2/3- TSH 1.69  Followed by Migel Faust: TSH q4 wks               A/P: Sophie Ashraf is a 34 y.o.  at 33w0d.  - Reassuring GS today, WNL. Will defer to Dr. Ross if needing additional growth scans for hyperthyroidism. Recent Thyroid labs WNL.   - PTL precautions  - RTC in 2 weeks with appt with Dr. Ross       Diagnosis Plan   1. Hyperthyroidism affecting pregnancy in third trimester        2. Supervision of high risk pregnancy in third trimester        3. 33 weeks gestation of pregnancy          SANJANA Ramsey  2023  09:56 CDT

## 2023-06-05 ENCOUNTER — TELEPHONE (OUTPATIENT)
Age: 53
End: 2023-06-05

## 2023-06-05 NOTE — TELEPHONE ENCOUNTER
Pt called noting that she was out of test strips and her pharmacy told her that Dr. Prudence Saini had to do a PA for her test strips.

## 2023-06-05 NOTE — TELEPHONE ENCOUNTER
Pharmacist with [facility] stated as of June 1, 2023 pt has a new insurance and another PA is needed in order for pt to refill prescription for test strips. PA initiated through [employer] for  Accu-Check Guide strips under [employer]     Accu-Check Guide test strips approved 01/01/2023 - 12/31/2023 PA #: [id number]. Pt notified via phone call.

## 2023-06-05 NOTE — TELEPHONE ENCOUNTER
Please call the pharmacy as you had gotten the PA approved last week and make sure she can  the strips and let her know if this is the case. Thanks.

## 2023-06-06 ENCOUNTER — OFFICE VISIT (OUTPATIENT)
Age: 53
End: 2023-06-06

## 2023-06-06 VITALS
SYSTOLIC BLOOD PRESSURE: 132 MMHG | HEIGHT: 64 IN | BODY MASS INDEX: 34.38 KG/M2 | DIASTOLIC BLOOD PRESSURE: 68 MMHG | WEIGHT: 201.4 LBS | HEART RATE: 111 BPM

## 2023-06-06 DIAGNOSIS — I10 ESSENTIAL (PRIMARY) HYPERTENSION: ICD-10-CM

## 2023-06-06 DIAGNOSIS — R79.89 ELEVATED LFTS: ICD-10-CM

## 2023-06-06 DIAGNOSIS — E10.21 TYPE 1 DIABETES MELLITUS WITH DIABETIC NEPHROPATHY (HCC): Primary | ICD-10-CM

## 2023-06-06 DIAGNOSIS — R94.6 ABNORMAL RESULTS OF THYROID FUNCTION STUDIES: ICD-10-CM

## 2023-06-06 DIAGNOSIS — E78.2 MIXED HYPERLIPIDEMIA: ICD-10-CM

## 2023-06-06 PROCEDURE — 3075F SYST BP GE 130 - 139MM HG: CPT | Performed by: INTERNAL MEDICINE

## 2023-06-06 PROCEDURE — 3046F HEMOGLOBIN A1C LEVEL >9.0%: CPT | Performed by: INTERNAL MEDICINE

## 2023-06-06 PROCEDURE — 3078F DIAST BP <80 MM HG: CPT | Performed by: INTERNAL MEDICINE

## 2023-06-06 PROCEDURE — 99214 OFFICE O/P EST MOD 30 MIN: CPT | Performed by: INTERNAL MEDICINE

## 2023-06-06 RX ORDER — AMOXICILLIN AND CLAVULANATE POTASSIUM 875; 125 MG/1; MG/1
TABLET, FILM COATED ORAL
COMMUNITY

## 2023-06-06 NOTE — PROGRESS NOTES
tablet by mouth every 6 hours as needed    citalopram (CELEXA) 40 MG tablet Take 1 tablet by mouth daily    diclofenac sodium (VOLTAREN) 1 % GEL Apply 2 g topically 4 times daily as needed    famotidine (PEPCID) 40 MG tablet Take 1 tablet by mouth daily    fluticasone (FLONASE) 50 MCG/ACT nasal spray 2 sprays by Nasal route daily as needed    gabapentin (NEURONTIN) 600 MG tablet Take 0.5 tablets by mouth 3 times daily. glucagon 1 MG injection USE AS DIRECTED IN KIT INSTRUCTIONS    levonorgestrel (MIRENA) IUD 52 mg 1 Device by IntraUTERine route once    metoclopramide (REGLAN) 5 MG tablet Take 1 tablet by mouth 2 times daily    mirtazapine (REMERON) 15 MG tablet Take 1 tablet by mouth daily    naloxone 4 MG/0.1ML LIQD nasal spray Use 1 spray intranasally, then discard. Repeat with new spray every 2 min as needed for opioid overdose symptoms, alternating nostrils. nystatin (MYCOSTATIN) 470039 UNIT/GM powder Apply topically    oxyCODONE (ROXICODONE) 5 MG immediate release tablet Take 1 tablet by mouth every 4 hours as needed. rosuvastatin (CRESTOR) 40 MG tablet Take 1 tablet by mouth daily     No current facility-administered medications for this visit. Allergies   Allergen Reactions    Metolazone Other (See Comments)     Caused hyponatremia    Simvastatin Myalgia    Lisinopril Cough    Methylprednisolone Other (See Comments)     Caused severe hyperglycemia with the medrol pack     Review of Systems: PER HPI    Physical Examination:  Blood pressure 132/68, pulse (!) 111, height 5' 4\" (1.626 m), weight 201 lb 6.4 oz (91.4 kg).   General: pleasant, no distress, good eye contact   Neck: no carotid bruits  Cardiovascular: regular, (+) tachycardic, nl s1 and s2, 2/6 systolic murmur  Respiratory: clear bilaterally  Integumentary: 2+ edema with weeping of both legs  Psychiatric: normal mood and affect    Data Reviewed:   - none new for review    Assessment/Plan:     1) Type 1 DM uncontrolled with neuro

## 2023-06-06 NOTE — PATIENT INSTRUCTIONS
1) Your blood sugars look better so stay on lantus 37 units daily and the humalog scale. 2) For the next 3 days take 2 of the 2 mg tabs of bumex in the morning and 1 tab in the afternoon to see if this helps pull a little more fluid of your legs and then go back to 1 tab twice daily.

## 2023-06-23 ENCOUNTER — HOSPITAL ENCOUNTER (INPATIENT)
Facility: HOSPITAL | Age: 53
LOS: 7 days | Discharge: HOME | DRG: 637 | End: 2023-06-30
Attending: EMERGENCY MEDICINE | Admitting: INTERNAL MEDICINE
Payer: MEDICARE

## 2023-06-23 ENCOUNTER — APPOINTMENT (OUTPATIENT)
Facility: HOSPITAL | Age: 53
DRG: 637 | End: 2023-06-23
Payer: MEDICARE

## 2023-06-23 ENCOUNTER — APPOINTMENT (OUTPATIENT)
Facility: HOSPITAL | Age: 53
DRG: 637 | End: 2023-06-23
Attending: INTERNAL MEDICINE
Payer: MEDICARE

## 2023-06-23 DIAGNOSIS — R60.0 BILATERAL LEG EDEMA: Primary | ICD-10-CM

## 2023-06-23 DIAGNOSIS — E10.65 TYPE 1 DIABETES MELLITUS WITH HYPERGLYCEMIA (HCC): ICD-10-CM

## 2023-06-23 PROBLEM — E87.1 HYPONATREMIA: Status: ACTIVE | Noted: 2023-06-23

## 2023-06-23 LAB
ALBUMIN SERPL-MCNC: 3.5 G/DL (ref 3.5–5)
ALBUMIN/GLOB SERPL: 0.9 (ref 1.1–2.2)
ALP SERPL-CCNC: 108 U/L (ref 45–117)
ALT SERPL-CCNC: 29 U/L (ref 12–78)
ANION GAP SERPL CALC-SCNC: 5 MMOL/L (ref 5–15)
ANION GAP SERPL CALC-SCNC: 9 MMOL/L (ref 5–15)
APPEARANCE UR: CLEAR
ARTERIAL PATENCY WRIST A: POSITIVE
AST SERPL-CCNC: 71 U/L (ref 15–37)
BACTERIA URNS QL MICRO: NEGATIVE /HPF
BASE DEFICIT BLD-SCNC: 1.6 MMOL/L
BASOPHILS # BLD: 0 K/UL (ref 0–0.1)
BASOPHILS NFR BLD: 0 % (ref 0–1)
BDY SITE: ABNORMAL
BILIRUB SERPL-MCNC: 0.5 MG/DL (ref 0.2–1)
BILIRUB UR QL: NEGATIVE
BUN SERPL-MCNC: 24 MG/DL (ref 6–20)
BUN SERPL-MCNC: 25 MG/DL (ref 6–20)
BUN/CREAT SERPL: 10 (ref 12–20)
BUN/CREAT SERPL: 9 (ref 12–20)
CALCIUM SERPL-MCNC: 8.4 MG/DL (ref 8.5–10.1)
CALCIUM SERPL-MCNC: 8.6 MG/DL (ref 8.5–10.1)
CHLORIDE SERPL-SCNC: 83 MMOL/L (ref 97–108)
CHLORIDE SERPL-SCNC: 90 MMOL/L (ref 97–108)
CO2 SERPL-SCNC: 24 MMOL/L (ref 21–32)
CO2 SERPL-SCNC: 30 MMOL/L (ref 21–32)
COLOR UR: ABNORMAL
COMMENT:: NORMAL
CREAT SERPL-MCNC: 2.5 MG/DL (ref 0.55–1.02)
CREAT SERPL-MCNC: 2.77 MG/DL (ref 0.55–1.02)
CREAT UR-MCNC: 20.5 MG/DL
DIFFERENTIAL METHOD BLD: ABNORMAL
ECHO AO ROOT DIAM: 2.9 CM
ECHO AO ROOT INDEX: 1.46 CM/M2
ECHO AV AREA PEAK VELOCITY: 2.8 CM2
ECHO AV AREA/BSA PEAK VELOCITY: 1.4 CM2/M2
ECHO AV PEAK GRADIENT: 7 MMHG
ECHO AV PEAK VELOCITY: 1.3 M/S
ECHO AV VELOCITY RATIO: 0.92
ECHO BSA: 2.04 M2
ECHO BSA: 2.04 M2
ECHO LA DIAMETER INDEX: 1.77 CM/M2
ECHO LA DIAMETER: 3.5 CM
ECHO LA TO AORTIC ROOT RATIO: 1.21
ECHO LA VOL 2C: 72 ML (ref 22–52)
ECHO LA VOL 2C: 76 ML (ref 22–52)
ECHO LA VOL 4C: 71 ML (ref 22–52)
ECHO LA VOL 4C: 76 ML (ref 22–52)
ECHO LA VOLUME AREA LENGTH: 80 ML
ECHO LA VOLUME INDEX AREA LENGTH: 40 ML/M2 (ref 16–34)
ECHO LV E' LATERAL VELOCITY: 9 CM/S
ECHO LV E' SEPTAL VELOCITY: 4 CM/S
ECHO LV EDV A4C: 81 ML
ECHO LV EDV INDEX A4C: 41 ML/M2
ECHO LV EJECTION FRACTION A4C: 65 %
ECHO LV ESV A4C: 29 ML
ECHO LV ESV INDEX A4C: 15 ML/M2
ECHO LV FRACTIONAL SHORTENING: 33 % (ref 28–44)
ECHO LV INTERNAL DIMENSION DIASTOLE INDEX: 2.12 CM/M2
ECHO LV INTERNAL DIMENSION DIASTOLIC: 4.2 CM (ref 3.9–5.3)
ECHO LV INTERNAL DIMENSION SYSTOLIC INDEX: 1.41 CM/M2
ECHO LV INTERNAL DIMENSION SYSTOLIC: 2.8 CM
ECHO LV IVSD: 1.3 CM (ref 0.6–0.9)
ECHO LV MASS 2D: 200.6 G (ref 67–162)
ECHO LV MASS INDEX 2D: 101.3 G/M2 (ref 43–95)
ECHO LV POSTERIOR WALL DIASTOLIC: 1.3 CM (ref 0.6–0.9)
ECHO LV RELATIVE WALL THICKNESS RATIO: 0.62
ECHO LVOT AREA: 3.1 CM2
ECHO LVOT DIAM: 2 CM
ECHO LVOT PEAK GRADIENT: 6 MMHG
ECHO LVOT PEAK VELOCITY: 1.2 M/S
ECHO MV A VELOCITY: 0.63 M/S
ECHO MV E DECELERATION TIME (DT): 414.8 MS
ECHO MV E VELOCITY: 0.45 M/S
ECHO MV E/A RATIO: 0.71
ECHO MV E/E' LATERAL: 5
ECHO MV E/E' RATIO (AVERAGED): 8.13
ECHO MV E/E' SEPTAL: 11.25
ECHO RA AREA 4C: 19.4 CM2
ECHO RA VOLUME: 61 ML
ECHO RA VOLUME: 65 ML
ECHO RV TAPSE: 2.1 CM (ref 1.7–?)
EOSINOPHIL # BLD: 0 K/UL (ref 0–0.4)
EOSINOPHIL NFR BLD: 0 % (ref 0–7)
EPITH CASTS URNS QL MICRO: ABNORMAL /LPF
ERYTHROCYTE [DISTWIDTH] IN BLOOD BY AUTOMATED COUNT: 19.3 % (ref 11.5–14.5)
GAS FLOW.O2 O2 DELIVERY SYS: ABNORMAL
GLOBULIN SER CALC-MCNC: 3.8 G/DL (ref 2–4)
GLUCOSE BLD STRIP.AUTO-MCNC: 153 MG/DL (ref 65–117)
GLUCOSE BLD STRIP.AUTO-MCNC: 292 MG/DL (ref 65–117)
GLUCOSE BLD STRIP.AUTO-MCNC: 468 MG/DL (ref 65–117)
GLUCOSE BLD STRIP.AUTO-MCNC: 520 MG/DL (ref 65–117)
GLUCOSE BLD STRIP.AUTO-MCNC: 97 MG/DL (ref 65–117)
GLUCOSE SERPL-MCNC: 444 MG/DL (ref 65–100)
GLUCOSE SERPL-MCNC: 48 MG/DL (ref 65–100)
GLUCOSE UR STRIP.AUTO-MCNC: >1000 MG/DL
HCO3 BLD-SCNC: 25.3 MMOL/L (ref 22–26)
HCT VFR BLD AUTO: 30.2 % (ref 35–47)
HGB BLD-MCNC: 8.9 G/DL (ref 11.5–16)
HGB UR QL STRIP: ABNORMAL
IMM GRANULOCYTES # BLD AUTO: 0 K/UL (ref 0–0.04)
IMM GRANULOCYTES NFR BLD AUTO: 1 % (ref 0–0.5)
KETONES UR QL STRIP.AUTO: NEGATIVE MG/DL
LEUKOCYTE ESTERASE UR QL STRIP.AUTO: NEGATIVE
LYMPHOCYTES # BLD: 1.1 K/UL (ref 0.8–3.5)
LYMPHOCYTES NFR BLD: 13 % (ref 12–49)
MCH RBC QN AUTO: 20.9 PG (ref 26–34)
MCHC RBC AUTO-ENTMCNC: 29.5 G/DL (ref 30–36.5)
MCV RBC AUTO: 70.9 FL (ref 80–99)
MONOCYTES # BLD: 0.4 K/UL (ref 0–1)
MONOCYTES NFR BLD: 5 % (ref 5–13)
NEUTS SEG # BLD: 6.4 K/UL (ref 1.8–8)
NEUTS SEG NFR BLD: 81 % (ref 32–75)
NITRITE UR QL STRIP.AUTO: NEGATIVE
NRBC # BLD: 0 K/UL (ref 0–0.01)
NRBC BLD-RTO: 0 PER 100 WBC
NT PRO BNP: 3370 PG/ML
OSMOLALITY SERPL: 265 MOSM/KG H2O
OSMOLALITY UR: 160 MOSM/KG H2O
PCO2 BLD: 51.8 MMHG (ref 35–45)
PH BLD: 7.3 (ref 7.35–7.45)
PH UR STRIP: 5.5 (ref 5–8)
PLATELET # BLD AUTO: 373 K/UL (ref 150–400)
PMV BLD AUTO: 9.1 FL (ref 8.9–12.9)
PO2 BLD: 97 MMHG (ref 80–100)
POTASSIUM SERPL-SCNC: 4.6 MMOL/L (ref 3.5–5.1)
POTASSIUM SERPL-SCNC: 5 MMOL/L (ref 3.5–5.1)
PROT SERPL-MCNC: 7.3 G/DL (ref 6.4–8.2)
PROT UR STRIP-MCNC: 30 MG/DL
PROT UR-MCNC: 33 MG/DL (ref 0–11.9)
PROT/CREAT UR-RTO: 1.6
RBC # BLD AUTO: 4.26 M/UL (ref 3.8–5.2)
RBC #/AREA URNS HPF: ABNORMAL /HPF (ref 0–5)
SAO2 % BLD: 96.6 % (ref 92–97)
SERVICE CMNT-IMP: ABNORMAL
SERVICE CMNT-IMP: NORMAL
SODIUM SERPL-SCNC: 116 MMOL/L (ref 136–145)
SODIUM SERPL-SCNC: 125 MMOL/L (ref 136–145)
SODIUM UR-SCNC: 24 MMOL/L
SP GR UR REFRACTOMETRY: 1.01
SPECIMEN HOLD: NORMAL
SPECIMEN HOLD: NORMAL
SPECIMEN TYPE: ABNORMAL
TROPONIN I SERPL HS-MCNC: 70 NG/L (ref 0–51)
URATE SERPL-MCNC: 6.1 MG/DL (ref 2.6–6)
UROBILINOGEN UR QL STRIP.AUTO: 0.2 EU/DL (ref 0.2–1)
WBC # BLD AUTO: 7.9 K/UL (ref 3.6–11)
WBC URNS QL MICRO: ABNORMAL /HPF (ref 0–4)

## 2023-06-23 PROCEDURE — 83930 ASSAY OF BLOOD OSMOLALITY: CPT

## 2023-06-23 PROCEDURE — 83935 ASSAY OF URINE OSMOLALITY: CPT

## 2023-06-23 PROCEDURE — 82962 GLUCOSE BLOOD TEST: CPT

## 2023-06-23 PROCEDURE — 93970 EXTREMITY STUDY: CPT

## 2023-06-23 PROCEDURE — 2500000003 HC RX 250 WO HCPCS: Performed by: INTERNAL MEDICINE

## 2023-06-23 PROCEDURE — 6370000000 HC RX 637 (ALT 250 FOR IP): Performed by: INTERNAL MEDICINE

## 2023-06-23 PROCEDURE — 36415 COLL VENOUS BLD VENIPUNCTURE: CPT

## 2023-06-23 PROCEDURE — 6370000000 HC RX 637 (ALT 250 FOR IP): Performed by: STUDENT IN AN ORGANIZED HEALTH CARE EDUCATION/TRAINING PROGRAM

## 2023-06-23 PROCEDURE — 80053 COMPREHEN METABOLIC PANEL: CPT

## 2023-06-23 PROCEDURE — 73590 X-RAY EXAM OF LOWER LEG: CPT

## 2023-06-23 PROCEDURE — 76770 US EXAM ABDO BACK WALL COMP: CPT

## 2023-06-23 PROCEDURE — 81001 URINALYSIS AUTO W/SCOPE: CPT

## 2023-06-23 PROCEDURE — 82803 BLOOD GASES ANY COMBINATION: CPT

## 2023-06-23 PROCEDURE — 82570 ASSAY OF URINE CREATININE: CPT

## 2023-06-23 PROCEDURE — 93306 TTE W/DOPPLER COMPLETE: CPT

## 2023-06-23 PROCEDURE — 2580000003 HC RX 258: Performed by: EMERGENCY MEDICINE

## 2023-06-23 PROCEDURE — 85025 COMPLETE CBC W/AUTO DIFF WBC: CPT

## 2023-06-23 PROCEDURE — 84300 ASSAY OF URINE SODIUM: CPT

## 2023-06-23 PROCEDURE — 71045 X-RAY EXAM CHEST 1 VIEW: CPT

## 2023-06-23 PROCEDURE — 6360000002 HC RX W HCPCS: Performed by: INTERNAL MEDICINE

## 2023-06-23 PROCEDURE — 6370000000 HC RX 637 (ALT 250 FOR IP): Performed by: EMERGENCY MEDICINE

## 2023-06-23 PROCEDURE — 84550 ASSAY OF BLOOD/URIC ACID: CPT

## 2023-06-23 PROCEDURE — 6360000002 HC RX W HCPCS: Performed by: EMERGENCY MEDICINE

## 2023-06-23 PROCEDURE — 2060000000 HC ICU INTERMEDIATE R&B

## 2023-06-23 PROCEDURE — 99285 EMERGENCY DEPT VISIT HI MDM: CPT

## 2023-06-23 PROCEDURE — 84156 ASSAY OF PROTEIN URINE: CPT

## 2023-06-23 PROCEDURE — 84484 ASSAY OF TROPONIN QUANT: CPT

## 2023-06-23 PROCEDURE — 83880 ASSAY OF NATRIURETIC PEPTIDE: CPT

## 2023-06-23 PROCEDURE — 36600 WITHDRAWAL OF ARTERIAL BLOOD: CPT

## 2023-06-23 PROCEDURE — 2580000003 HC RX 258: Performed by: INTERNAL MEDICINE

## 2023-06-23 PROCEDURE — 2500000003 HC RX 250 WO HCPCS: Performed by: EMERGENCY MEDICINE

## 2023-06-23 RX ORDER — IPRATROPIUM BROMIDE AND ALBUTEROL SULFATE 2.5; .5 MG/3ML; MG/3ML
1 SOLUTION RESPIRATORY (INHALATION) EVERY 4 HOURS PRN
Status: DISCONTINUED | OUTPATIENT
Start: 2023-06-23 | End: 2023-06-30 | Stop reason: HOSPADM

## 2023-06-23 RX ORDER — INSULIN GLARGINE 100 [IU]/ML
40 INJECTION, SOLUTION SUBCUTANEOUS DAILY
Status: DISCONTINUED | OUTPATIENT
Start: 2023-06-23 | End: 2023-06-23

## 2023-06-23 RX ORDER — ROSUVASTATIN CALCIUM 10 MG/1
10 TABLET, COATED ORAL DAILY
Status: DISCONTINUED | OUTPATIENT
Start: 2023-06-23 | End: 2023-06-25

## 2023-06-23 RX ORDER — FAMOTIDINE 20 MG/1
10 TABLET, FILM COATED ORAL DAILY
Status: DISCONTINUED | OUTPATIENT
Start: 2023-06-24 | End: 2023-06-30 | Stop reason: HOSPADM

## 2023-06-23 RX ORDER — GABAPENTIN 300 MG/1
300 CAPSULE ORAL 3 TIMES DAILY
Status: DISCONTINUED | OUTPATIENT
Start: 2023-06-23 | End: 2023-06-30 | Stop reason: HOSPADM

## 2023-06-23 RX ORDER — BUMETANIDE 0.25 MG/ML
0.5 INJECTION INTRAMUSCULAR; INTRAVENOUS ONCE
Status: COMPLETED | OUTPATIENT
Start: 2023-06-23 | End: 2023-06-23

## 2023-06-23 RX ORDER — MIRTAZAPINE 15 MG/1
15 TABLET, FILM COATED ORAL NIGHTLY
Status: DISCONTINUED | OUTPATIENT
Start: 2023-06-23 | End: 2023-06-23

## 2023-06-23 RX ORDER — BUTALBITAL, ACETAMINOPHEN AND CAFFEINE 50; 325; 40 MG/1; MG/1; MG/1
1 TABLET ORAL EVERY 6 HOURS PRN
Status: DISCONTINUED | OUTPATIENT
Start: 2023-06-23 | End: 2023-06-30 | Stop reason: HOSPADM

## 2023-06-23 RX ORDER — FLUTICASONE PROPIONATE 50 MCG
2 SPRAY, SUSPENSION (ML) NASAL DAILY PRN
Status: DISCONTINUED | OUTPATIENT
Start: 2023-06-23 | End: 2023-06-30 | Stop reason: HOSPADM

## 2023-06-23 RX ORDER — SODIUM CHLORIDE 0.9 % (FLUSH) 0.9 %
5-40 SYRINGE (ML) INJECTION PRN
Status: DISCONTINUED | OUTPATIENT
Start: 2023-06-23 | End: 2023-06-30 | Stop reason: HOSPADM

## 2023-06-23 RX ORDER — CITALOPRAM 20 MG/1
40 TABLET ORAL DAILY
Status: DISCONTINUED | OUTPATIENT
Start: 2023-06-23 | End: 2023-06-30 | Stop reason: HOSPADM

## 2023-06-23 RX ORDER — ESOMEPRAZOLE MAGNESIUM 40 MG/1
40 FOR SUSPENSION ORAL DAILY
COMMUNITY

## 2023-06-23 RX ORDER — MORPHINE SULFATE 30 MG/1
90 TABLET, FILM COATED, EXTENDED RELEASE ORAL EVERY 12 HOURS SCHEDULED
Status: DISCONTINUED | OUTPATIENT
Start: 2023-06-23 | End: 2023-06-24

## 2023-06-23 RX ORDER — FUROSEMIDE 20 MG/1
20 TABLET ORAL DAILY
Status: ON HOLD | COMMUNITY
End: 2023-06-30 | Stop reason: HOSPADM

## 2023-06-23 RX ORDER — CITALOPRAM 20 MG/1
40 TABLET ORAL DAILY
Status: DISCONTINUED | OUTPATIENT
Start: 2023-06-23 | End: 2023-06-23

## 2023-06-23 RX ORDER — SODIUM CHLORIDE 0.9 % (FLUSH) 0.9 %
5-40 SYRINGE (ML) INJECTION EVERY 12 HOURS SCHEDULED
Status: DISCONTINUED | OUTPATIENT
Start: 2023-06-23 | End: 2023-06-30 | Stop reason: HOSPADM

## 2023-06-23 RX ORDER — OXYCODONE HYDROCHLORIDE 5 MG/1
5 TABLET ORAL EVERY 4 HOURS PRN
Status: DISCONTINUED | OUTPATIENT
Start: 2023-06-23 | End: 2023-06-30 | Stop reason: HOSPADM

## 2023-06-23 RX ORDER — NICOTINE 21 MG/24HR
1 PATCH, TRANSDERMAL 24 HOURS TRANSDERMAL DAILY
Status: DISCONTINUED | OUTPATIENT
Start: 2023-06-23 | End: 2023-06-30 | Stop reason: HOSPADM

## 2023-06-23 RX ORDER — BUPROPION HYDROCHLORIDE 150 MG/1
150 TABLET, EXTENDED RELEASE ORAL DAILY
Status: DISCONTINUED | OUTPATIENT
Start: 2023-06-24 | End: 2023-06-23

## 2023-06-23 RX ORDER — SODIUM CHLORIDE 9 MG/ML
INJECTION, SOLUTION INTRAVENOUS PRN
Status: DISCONTINUED | OUTPATIENT
Start: 2023-06-23 | End: 2023-06-30 | Stop reason: HOSPADM

## 2023-06-23 RX ORDER — METRONIDAZOLE 500 MG/100ML
500 INJECTION, SOLUTION INTRAVENOUS EVERY 12 HOURS
Status: DISCONTINUED | OUTPATIENT
Start: 2023-06-23 | End: 2023-06-25

## 2023-06-23 RX ORDER — FAMOTIDINE 20 MG/1
40 TABLET, FILM COATED ORAL DAILY
Status: DISCONTINUED | OUTPATIENT
Start: 2023-06-23 | End: 2023-06-23

## 2023-06-23 RX ORDER — BUPROPION HYDROCHLORIDE 150 MG/1
150 TABLET, EXTENDED RELEASE ORAL DAILY
Status: DISCONTINUED | OUTPATIENT
Start: 2023-06-23 | End: 2023-06-23

## 2023-06-23 RX ORDER — ACETAMINOPHEN 325 MG/1
650 TABLET ORAL EVERY 6 HOURS PRN
Status: DISCONTINUED | OUTPATIENT
Start: 2023-06-23 | End: 2023-06-25 | Stop reason: SDUPTHER

## 2023-06-23 RX ORDER — ASPIRIN 81 MG/1
81 TABLET ORAL DAILY
Status: DISCONTINUED | OUTPATIENT
Start: 2023-06-23 | End: 2023-06-30 | Stop reason: HOSPADM

## 2023-06-23 RX ORDER — ALBUTEROL SULFATE 2.5 MG/3ML
2.5 SOLUTION RESPIRATORY (INHALATION) EVERY 6 HOURS PRN
Status: DISCONTINUED | OUTPATIENT
Start: 2023-06-23 | End: 2023-06-30 | Stop reason: HOSPADM

## 2023-06-23 RX ORDER — BUMETANIDE 0.25 MG/ML
1 INJECTION INTRAMUSCULAR; INTRAVENOUS 2 TIMES DAILY
Status: DISCONTINUED | OUTPATIENT
Start: 2023-06-23 | End: 2023-06-27

## 2023-06-23 RX ORDER — PANTOPRAZOLE SODIUM 40 MG/1
40 TABLET, DELAYED RELEASE ORAL DAILY
Status: DISCONTINUED | OUTPATIENT
Start: 2023-06-23 | End: 2023-06-30 | Stop reason: HOSPADM

## 2023-06-23 RX ORDER — ENOXAPARIN SODIUM 100 MG/ML
30 INJECTION SUBCUTANEOUS DAILY
Status: DISCONTINUED | OUTPATIENT
Start: 2023-06-23 | End: 2023-06-26

## 2023-06-23 RX ORDER — ACETAMINOPHEN 650 MG/1
650 SUPPOSITORY RECTAL EVERY 6 HOURS PRN
Status: DISCONTINUED | OUTPATIENT
Start: 2023-06-23 | End: 2023-06-25 | Stop reason: SDUPTHER

## 2023-06-23 RX ORDER — INSULIN GLARGINE 100 [IU]/ML
35 INJECTION, SOLUTION SUBCUTANEOUS DAILY
Status: DISCONTINUED | OUTPATIENT
Start: 2023-06-24 | End: 2023-06-23

## 2023-06-23 RX ORDER — ONDANSETRON 4 MG/1
4 TABLET, ORALLY DISINTEGRATING ORAL EVERY 8 HOURS PRN
Status: DISCONTINUED | OUTPATIENT
Start: 2023-06-23 | End: 2023-06-30 | Stop reason: HOSPADM

## 2023-06-23 RX ORDER — ALPRAZOLAM 0.5 MG/1
2 TABLET ORAL 3 TIMES DAILY PRN
Status: DISCONTINUED | OUTPATIENT
Start: 2023-06-23 | End: 2023-06-30 | Stop reason: HOSPADM

## 2023-06-23 RX ORDER — MIRTAZAPINE 15 MG/1
15 TABLET, FILM COATED ORAL DAILY
Status: DISCONTINUED | OUTPATIENT
Start: 2023-06-23 | End: 2023-06-23

## 2023-06-23 RX ORDER — ARIPIPRAZOLE 30 MG/1
30 TABLET ORAL DAILY
COMMUNITY

## 2023-06-23 RX ORDER — INSULIN LISPRO 100 [IU]/ML
10 INJECTION, SOLUTION INTRAVENOUS; SUBCUTANEOUS ONCE
Status: COMPLETED | OUTPATIENT
Start: 2023-06-23 | End: 2023-06-23

## 2023-06-23 RX ORDER — ARIPIPRAZOLE 5 MG/1
30 TABLET ORAL DAILY
Status: DISCONTINUED | OUTPATIENT
Start: 2023-06-23 | End: 2023-06-30 | Stop reason: HOSPADM

## 2023-06-23 RX ORDER — POLYETHYLENE GLYCOL 3350 17 G/17G
17 POWDER, FOR SOLUTION ORAL DAILY PRN
Status: DISCONTINUED | OUTPATIENT
Start: 2023-06-23 | End: 2023-06-30 | Stop reason: HOSPADM

## 2023-06-23 RX ORDER — DEXTROSE MONOHYDRATE 100 MG/ML
INJECTION, SOLUTION INTRAVENOUS CONTINUOUS PRN
Status: DISCONTINUED | OUTPATIENT
Start: 2023-06-23 | End: 2023-06-30 | Stop reason: HOSPADM

## 2023-06-23 RX ORDER — METOCLOPRAMIDE 10 MG/1
5 TABLET ORAL 2 TIMES DAILY
Status: DISCONTINUED | OUTPATIENT
Start: 2023-06-23 | End: 2023-06-23

## 2023-06-23 RX ORDER — BUSPIRONE HYDROCHLORIDE 10 MG/1
30 TABLET ORAL 2 TIMES DAILY
Status: DISCONTINUED | OUTPATIENT
Start: 2023-06-23 | End: 2023-06-23

## 2023-06-23 RX ORDER — ONDANSETRON 2 MG/ML
4 INJECTION INTRAMUSCULAR; INTRAVENOUS EVERY 6 HOURS PRN
Status: DISCONTINUED | OUTPATIENT
Start: 2023-06-23 | End: 2023-06-30 | Stop reason: HOSPADM

## 2023-06-23 RX ORDER — INSULIN LISPRO 100 [IU]/ML
10 INJECTION, SOLUTION INTRAVENOUS; SUBCUTANEOUS
Status: DISCONTINUED | OUTPATIENT
Start: 2023-06-23 | End: 2023-06-24 | Stop reason: SDUPTHER

## 2023-06-23 RX ORDER — ALPRAZOLAM 2 MG/1
2 TABLET ORAL 3 TIMES DAILY PRN
COMMUNITY

## 2023-06-23 RX ORDER — INSULIN GLARGINE 100 [IU]/ML
35 INJECTION, SOLUTION SUBCUTANEOUS NIGHTLY
Status: DISCONTINUED | OUTPATIENT
Start: 2023-06-24 | End: 2023-06-26

## 2023-06-23 RX ORDER — ALBUTEROL SULFATE 90 UG/1
2 AEROSOL, METERED RESPIRATORY (INHALATION) EVERY 6 HOURS PRN
Status: DISCONTINUED | OUTPATIENT
Start: 2023-06-23 | End: 2023-06-23 | Stop reason: CLARIF

## 2023-06-23 RX ADMIN — BUMETANIDE 1 MG: 0.25 INJECTION INTRAMUSCULAR; INTRAVENOUS at 21:04

## 2023-06-23 RX ADMIN — BUSPIRONE HYDROCHLORIDE 15 MG: 10 TABLET ORAL at 21:05

## 2023-06-23 RX ADMIN — METRONIDAZOLE 500 MG: 5 INJECTION, SOLUTION INTRAVENOUS at 21:05

## 2023-06-23 RX ADMIN — INSULIN GLARGINE 40 UNITS: 100 INJECTION, SOLUTION SUBCUTANEOUS at 18:35

## 2023-06-23 RX ADMIN — Medication 5 UNITS: at 10:51

## 2023-06-23 RX ADMIN — BUMETANIDE 0.5 MG: 0.25 INJECTION INTRAMUSCULAR; INTRAVENOUS at 10:52

## 2023-06-23 RX ADMIN — SODIUM CHLORIDE 1000 MG: 900 INJECTION INTRAVENOUS at 10:53

## 2023-06-23 RX ADMIN — GABAPENTIN 300 MG: 300 CAPSULE ORAL at 18:35

## 2023-06-23 RX ADMIN — MORPHINE SULFATE 90 MG: 30 TABLET, FILM COATED, EXTENDED RELEASE ORAL at 21:31

## 2023-06-23 RX ADMIN — ARIPIPRAZOLE 30 MG: 5 TABLET ORAL at 21:03

## 2023-06-23 RX ADMIN — Medication 10 UNITS: at 12:59

## 2023-06-23 RX ADMIN — BUMETANIDE 0.5 MG: 0.25 INJECTION INTRAMUSCULAR; INTRAVENOUS at 16:04

## 2023-06-23 RX ADMIN — SODIUM CHLORIDE, PRESERVATIVE FREE 10 ML: 5 INJECTION INTRAVENOUS at 21:06

## 2023-06-23 RX ADMIN — GABAPENTIN 300 MG: 300 CAPSULE ORAL at 21:05

## 2023-06-23 RX ADMIN — CEFEPIME 2000 MG: 2 INJECTION, POWDER, FOR SOLUTION INTRAVENOUS at 18:36

## 2023-06-23 RX ADMIN — ALPRAZOLAM 2 MG: 0.5 TABLET ORAL at 21:04

## 2023-06-23 RX ADMIN — ASPIRIN 81 MG: 81 TABLET, COATED ORAL at 18:35

## 2023-06-23 RX ADMIN — ENOXAPARIN SODIUM 30 MG: 100 INJECTION SUBCUTANEOUS at 18:35

## 2023-06-23 RX ADMIN — PANTOPRAZOLE SODIUM 40 MG: 40 TABLET, DELAYED RELEASE ORAL at 21:03

## 2023-06-23 ASSESSMENT — PAIN SCALES - GENERAL
PAINLEVEL_OUTOF10: 7
PAINLEVEL_OUTOF10: 0

## 2023-06-23 ASSESSMENT — PAIN DESCRIPTION - LOCATION: LOCATION: BACK

## 2023-06-23 ASSESSMENT — PAIN - FUNCTIONAL ASSESSMENT: PAIN_FUNCTIONAL_ASSESSMENT: NONE - DENIES PAIN

## 2023-06-24 ENCOUNTER — APPOINTMENT (OUTPATIENT)
Facility: HOSPITAL | Age: 53
DRG: 637 | End: 2023-06-24
Payer: MEDICARE

## 2023-06-24 LAB
ALBUMIN SERPL-MCNC: 2.8 G/DL (ref 3.5–5)
ANION GAP SERPL CALC-SCNC: 5 MMOL/L (ref 5–15)
ANION GAP SERPL CALC-SCNC: 8 MMOL/L (ref 5–15)
BASOPHILS # BLD: 0 K/UL (ref 0–0.1)
BASOPHILS NFR BLD: 0 % (ref 0–1)
BUN SERPL-MCNC: 20 MG/DL (ref 6–20)
BUN SERPL-MCNC: 24 MG/DL (ref 6–20)
BUN/CREAT SERPL: 10 (ref 12–20)
BUN/CREAT SERPL: 8 (ref 12–20)
CALCIUM SERPL-MCNC: 8 MG/DL (ref 8.5–10.1)
CALCIUM SERPL-MCNC: 8.4 MG/DL (ref 8.5–10.1)
CHLORIDE SERPL-SCNC: 94 MMOL/L (ref 97–108)
CHLORIDE SERPL-SCNC: 96 MMOL/L (ref 97–108)
CO2 SERPL-SCNC: 26 MMOL/L (ref 21–32)
CO2 SERPL-SCNC: 31 MMOL/L (ref 21–32)
CREAT SERPL-MCNC: 2.39 MG/DL (ref 0.55–1.02)
CREAT SERPL-MCNC: 2.45 MG/DL (ref 0.55–1.02)
DIFFERENTIAL METHOD BLD: ABNORMAL
EOSINOPHIL # BLD: 0.1 K/UL (ref 0–0.4)
EOSINOPHIL NFR BLD: 1 % (ref 0–7)
ERYTHROCYTE [DISTWIDTH] IN BLOOD BY AUTOMATED COUNT: 19.9 % (ref 11.5–14.5)
EST. AVERAGE GLUCOSE BLD GHB EST-MCNC: 192 MG/DL
GLUCOSE BLD STRIP.AUTO-MCNC: 114 MG/DL (ref 65–117)
GLUCOSE BLD STRIP.AUTO-MCNC: 156 MG/DL (ref 65–117)
GLUCOSE BLD STRIP.AUTO-MCNC: 177 MG/DL (ref 65–117)
GLUCOSE BLD STRIP.AUTO-MCNC: 181 MG/DL (ref 65–117)
GLUCOSE BLD STRIP.AUTO-MCNC: 96 MG/DL (ref 65–117)
GLUCOSE SERPL-MCNC: 127 MG/DL (ref 65–100)
GLUCOSE SERPL-MCNC: 147 MG/DL (ref 65–100)
HBA1C MFR BLD: 8.3 % (ref 4–5.6)
HCT VFR BLD AUTO: 29.6 % (ref 35–47)
HGB BLD-MCNC: 8.3 G/DL (ref 11.5–16)
IMM GRANULOCYTES # BLD AUTO: 0.1 K/UL (ref 0–0.04)
IMM GRANULOCYTES NFR BLD AUTO: 1 % (ref 0–0.5)
LACTATE SERPL-SCNC: 1.3 MMOL/L (ref 0.4–2)
LYMPHOCYTES # BLD: 2.6 K/UL (ref 0.8–3.5)
LYMPHOCYTES NFR BLD: 26 % (ref 12–49)
MCH RBC QN AUTO: 20.7 PG (ref 26–34)
MCHC RBC AUTO-ENTMCNC: 28 G/DL (ref 30–36.5)
MCV RBC AUTO: 73.8 FL (ref 80–99)
MONOCYTES # BLD: 3.2 K/UL (ref 0–1)
MONOCYTES NFR BLD: 32 % (ref 5–13)
NEUTS SEG # BLD: 4.1 K/UL (ref 1.8–8)
NEUTS SEG NFR BLD: 40 % (ref 32–75)
NRBC # BLD: 0 K/UL (ref 0–0.01)
NRBC BLD-RTO: 0 PER 100 WBC
PHOSPHATE SERPL-MCNC: 3.7 MG/DL (ref 2.6–4.7)
PLATELET # BLD AUTO: 302 K/UL (ref 150–400)
PMV BLD AUTO: 9.1 FL (ref 8.9–12.9)
POTASSIUM SERPL-SCNC: 3.8 MMOL/L (ref 3.5–5.1)
POTASSIUM SERPL-SCNC: 4.7 MMOL/L (ref 3.5–5.1)
RBC # BLD AUTO: 4.01 M/UL (ref 3.8–5.2)
RBC MORPH BLD: ABNORMAL
SERVICE CMNT-IMP: ABNORMAL
SERVICE CMNT-IMP: NORMAL
SERVICE CMNT-IMP: NORMAL
SODIUM SERPL-SCNC: 128 MMOL/L (ref 136–145)
SODIUM SERPL-SCNC: 132 MMOL/L (ref 136–145)
WBC # BLD AUTO: 10.1 K/UL (ref 3.6–11)

## 2023-06-24 PROCEDURE — 80069 RENAL FUNCTION PANEL: CPT

## 2023-06-24 PROCEDURE — 83605 ASSAY OF LACTIC ACID: CPT

## 2023-06-24 PROCEDURE — 83036 HEMOGLOBIN GLYCOSYLATED A1C: CPT

## 2023-06-24 PROCEDURE — 71045 X-RAY EXAM CHEST 1 VIEW: CPT

## 2023-06-24 PROCEDURE — 82962 GLUCOSE BLOOD TEST: CPT

## 2023-06-24 PROCEDURE — 2060000000 HC ICU INTERMEDIATE R&B

## 2023-06-24 PROCEDURE — 80048 BASIC METABOLIC PNL TOTAL CA: CPT

## 2023-06-24 PROCEDURE — 94640 AIRWAY INHALATION TREATMENT: CPT

## 2023-06-24 PROCEDURE — 85025 COMPLETE CBC W/AUTO DIFF WBC: CPT

## 2023-06-24 PROCEDURE — 36415 COLL VENOUS BLD VENIPUNCTURE: CPT

## 2023-06-24 PROCEDURE — 6370000000 HC RX 637 (ALT 250 FOR IP): Performed by: INTERNAL MEDICINE

## 2023-06-24 PROCEDURE — 6360000002 HC RX W HCPCS: Performed by: INTERNAL MEDICINE

## 2023-06-24 PROCEDURE — 2700000000 HC OXYGEN THERAPY PER DAY

## 2023-06-24 PROCEDURE — 87040 BLOOD CULTURE FOR BACTERIA: CPT

## 2023-06-24 PROCEDURE — 2580000003 HC RX 258: Performed by: INTERNAL MEDICINE

## 2023-06-24 PROCEDURE — 2500000003 HC RX 250 WO HCPCS: Performed by: INTERNAL MEDICINE

## 2023-06-24 RX ORDER — MORPHINE SULFATE 15 MG/1
45 TABLET, FILM COATED, EXTENDED RELEASE ORAL EVERY 12 HOURS SCHEDULED
Status: DISCONTINUED | OUTPATIENT
Start: 2023-06-24 | End: 2023-06-24

## 2023-06-24 RX ORDER — INSULIN LISPRO 100 [IU]/ML
0-4 INJECTION, SOLUTION INTRAVENOUS; SUBCUTANEOUS
Status: DISCONTINUED | OUTPATIENT
Start: 2023-06-24 | End: 2023-06-30 | Stop reason: HOSPADM

## 2023-06-24 RX ORDER — MORPHINE SULFATE 15 MG/1
45 TABLET, FILM COATED, EXTENDED RELEASE ORAL EVERY 12 HOURS SCHEDULED
Status: DISCONTINUED | OUTPATIENT
Start: 2023-06-24 | End: 2023-06-30 | Stop reason: HOSPADM

## 2023-06-24 RX ORDER — INSULIN LISPRO 100 [IU]/ML
0-4 INJECTION, SOLUTION INTRAVENOUS; SUBCUTANEOUS NIGHTLY
Status: DISCONTINUED | OUTPATIENT
Start: 2023-06-24 | End: 2023-06-30 | Stop reason: HOSPADM

## 2023-06-24 RX ADMIN — ACETAMINOPHEN 650 MG: 325 TABLET ORAL at 23:16

## 2023-06-24 RX ADMIN — SODIUM CHLORIDE, PRESERVATIVE FREE 10 ML: 5 INJECTION INTRAVENOUS at 12:48

## 2023-06-24 RX ADMIN — ALBUTEROL SULFATE 2.5 MG: 2.5 SOLUTION RESPIRATORY (INHALATION) at 11:45

## 2023-06-24 RX ADMIN — METRONIDAZOLE 500 MG: 5 INJECTION, SOLUTION INTRAVENOUS at 19:10

## 2023-06-24 RX ADMIN — CEFEPIME 1000 MG: 1 INJECTION, POWDER, FOR SOLUTION INTRAMUSCULAR; INTRAVENOUS at 17:54

## 2023-06-24 RX ADMIN — INSULIN GLARGINE 35 UNITS: 100 INJECTION, SOLUTION SUBCUTANEOUS at 21:07

## 2023-06-24 RX ADMIN — METRONIDAZOLE 500 MG: 5 INJECTION, SOLUTION INTRAVENOUS at 07:22

## 2023-06-24 RX ADMIN — VANCOMYCIN HYDROCHLORIDE 2000 MG: 10 INJECTION, POWDER, LYOPHILIZED, FOR SOLUTION INTRAVENOUS at 15:54

## 2023-06-24 RX ADMIN — ALPRAZOLAM 2 MG: 0.5 TABLET ORAL at 23:16

## 2023-06-24 RX ADMIN — BUMETANIDE 1 MG: 0.25 INJECTION INTRAMUSCULAR; INTRAVENOUS at 20:58

## 2023-06-24 RX ADMIN — SODIUM CHLORIDE, PRESERVATIVE FREE 10 ML: 5 INJECTION INTRAVENOUS at 20:59

## 2023-06-24 RX ADMIN — ENOXAPARIN SODIUM 30 MG: 100 INJECTION SUBCUTANEOUS at 12:39

## 2023-06-24 RX ADMIN — ACETAMINOPHEN 650 MG: 650 SUPPOSITORY RECTAL at 14:43

## 2023-06-24 RX ADMIN — BUMETANIDE 1 MG: 0.25 INJECTION INTRAMUSCULAR; INTRAVENOUS at 12:39

## 2023-06-24 ASSESSMENT — PAIN SCALES - GENERAL
PAINLEVEL_OUTOF10: 0

## 2023-06-24 ASSESSMENT — PAIN DESCRIPTION - LOCATION: LOCATION: HEAD

## 2023-06-25 LAB
ALBUMIN SERPL-MCNC: 2.7 G/DL (ref 3.5–5)
ALBUMIN SERPL-MCNC: 2.7 G/DL (ref 3.5–5)
ALBUMIN SERPL-MCNC: 3.5 G/DL (ref 3.5–5)
ANION GAP SERPL CALC-SCNC: 5 MMOL/L (ref 5–15)
ANION GAP SERPL CALC-SCNC: 6 MMOL/L (ref 5–15)
ANION GAP SERPL CALC-SCNC: 7 MMOL/L (ref 5–15)
B PERT DNA SPEC QL NAA+PROBE: NOT DETECTED
BASOPHILS # BLD: 0 K/UL (ref 0–0.1)
BASOPHILS NFR BLD: 0 % (ref 0–1)
BORDETELLA PARAPERTUSSIS BY PCR: NOT DETECTED
BUN SERPL-MCNC: 20 MG/DL (ref 6–20)
BUN SERPL-MCNC: 21 MG/DL (ref 6–20)
BUN SERPL-MCNC: 22 MG/DL (ref 6–20)
BUN/CREAT SERPL: 9 (ref 12–20)
C PNEUM DNA SPEC QL NAA+PROBE: NOT DETECTED
CALCIUM SERPL-MCNC: 7.6 MG/DL (ref 8.5–10.1)
CALCIUM SERPL-MCNC: 7.6 MG/DL (ref 8.5–10.1)
CALCIUM SERPL-MCNC: 8.4 MG/DL (ref 8.5–10.1)
CHLORIDE SERPL-SCNC: 89 MMOL/L (ref 97–108)
CHLORIDE SERPL-SCNC: 93 MMOL/L (ref 97–108)
CHLORIDE SERPL-SCNC: 97 MMOL/L (ref 97–108)
CO2 SERPL-SCNC: 29 MMOL/L (ref 21–32)
CO2 SERPL-SCNC: 30 MMOL/L (ref 21–32)
CO2 SERPL-SCNC: 32 MMOL/L (ref 21–32)
CREAT SERPL-MCNC: 2.26 MG/DL (ref 0.55–1.02)
CREAT SERPL-MCNC: 2.31 MG/DL (ref 0.55–1.02)
CREAT SERPL-MCNC: 2.32 MG/DL (ref 0.55–1.02)
DIFFERENTIAL METHOD BLD: ABNORMAL
EOSINOPHIL # BLD: 0 K/UL (ref 0–0.4)
EOSINOPHIL NFR BLD: 0 % (ref 0–7)
ERYTHROCYTE [DISTWIDTH] IN BLOOD BY AUTOMATED COUNT: 20.4 % (ref 11.5–14.5)
FLUAV H1 2009 PAND RNA SPEC QL NAA+PROBE: NOT DETECTED
FLUAV H1 RNA SPEC QL NAA+PROBE: NOT DETECTED
FLUAV H3 RNA SPEC QL NAA+PROBE: NOT DETECTED
FLUAV SUBTYP SPEC NAA+PROBE: NOT DETECTED
FLUBV RNA SPEC QL NAA+PROBE: NOT DETECTED
GLUCOSE BLD STRIP.AUTO-MCNC: 139 MG/DL (ref 65–117)
GLUCOSE BLD STRIP.AUTO-MCNC: 161 MG/DL (ref 65–117)
GLUCOSE BLD STRIP.AUTO-MCNC: 215 MG/DL (ref 65–117)
GLUCOSE BLD STRIP.AUTO-MCNC: 232 MG/DL (ref 65–117)
GLUCOSE BLD STRIP.AUTO-MCNC: 288 MG/DL (ref 65–117)
GLUCOSE SERPL-MCNC: 134 MG/DL (ref 65–100)
GLUCOSE SERPL-MCNC: 216 MG/DL (ref 65–100)
GLUCOSE SERPL-MCNC: 88 MG/DL (ref 65–100)
HADV DNA SPEC QL NAA+PROBE: NOT DETECTED
HCOV 229E RNA SPEC QL NAA+PROBE: NOT DETECTED
HCOV HKU1 RNA SPEC QL NAA+PROBE: NOT DETECTED
HCOV NL63 RNA SPEC QL NAA+PROBE: NOT DETECTED
HCOV OC43 RNA SPEC QL NAA+PROBE: NOT DETECTED
HCT VFR BLD AUTO: 26.1 % (ref 35–47)
HGB BLD-MCNC: 7.6 G/DL (ref 11.5–16)
HMPV RNA SPEC QL NAA+PROBE: NOT DETECTED
HPIV1 RNA SPEC QL NAA+PROBE: NOT DETECTED
HPIV2 RNA SPEC QL NAA+PROBE: NOT DETECTED
HPIV3 RNA SPEC QL NAA+PROBE: NOT DETECTED
HPIV4 RNA SPEC QL NAA+PROBE: NOT DETECTED
IMM GRANULOCYTES # BLD AUTO: 0.1 K/UL (ref 0–0.04)
IMM GRANULOCYTES NFR BLD AUTO: 1 % (ref 0–0.5)
LYMPHOCYTES # BLD: 1.2 K/UL (ref 0.8–3.5)
LYMPHOCYTES NFR BLD: 15 % (ref 12–49)
M PNEUMO DNA SPEC QL NAA+PROBE: NOT DETECTED
MCH RBC QN AUTO: 21.1 PG (ref 26–34)
MCHC RBC AUTO-ENTMCNC: 29.1 G/DL (ref 30–36.5)
MCV RBC AUTO: 72.5 FL (ref 80–99)
MONOCYTES # BLD: 0.9 K/UL (ref 0–1)
MONOCYTES NFR BLD: 11 % (ref 5–13)
NEUTS SEG # BLD: 5.6 K/UL (ref 1.8–8)
NEUTS SEG NFR BLD: 73 % (ref 32–75)
NRBC # BLD: 0 K/UL (ref 0–0.01)
NRBC BLD-RTO: 0 PER 100 WBC
PHOSPHATE SERPL-MCNC: 2.8 MG/DL (ref 2.6–4.7)
PHOSPHATE SERPL-MCNC: 3 MG/DL (ref 2.6–4.7)
PHOSPHATE SERPL-MCNC: 3.8 MG/DL (ref 2.6–4.7)
PLATELET # BLD AUTO: 271 K/UL (ref 150–400)
PMV BLD AUTO: 9.4 FL (ref 8.9–12.9)
POTASSIUM SERPL-SCNC: 3 MMOL/L (ref 3.5–5.1)
POTASSIUM SERPL-SCNC: 3.5 MMOL/L (ref 3.5–5.1)
POTASSIUM SERPL-SCNC: 3.9 MMOL/L (ref 3.5–5.1)
PROCALCITONIN SERPL-MCNC: 0.18 NG/ML
RBC # BLD AUTO: 3.6 M/UL (ref 3.8–5.2)
RBC MORPH BLD: ABNORMAL
RSV RNA SPEC QL NAA+PROBE: NOT DETECTED
RV+EV RNA SPEC QL NAA+PROBE: NOT DETECTED
SARS-COV-2 RNA RESP QL NAA+PROBE: DETECTED
SERVICE CMNT-IMP: ABNORMAL
SODIUM SERPL-SCNC: 126 MMOL/L (ref 136–145)
SODIUM SERPL-SCNC: 128 MMOL/L (ref 136–145)
SODIUM SERPL-SCNC: 134 MMOL/L (ref 136–145)
WBC # BLD AUTO: 7.8 K/UL (ref 3.6–11)

## 2023-06-25 PROCEDURE — 82728 ASSAY OF FERRITIN: CPT

## 2023-06-25 PROCEDURE — 2060000000 HC ICU INTERMEDIATE R&B

## 2023-06-25 PROCEDURE — P9047 ALBUMIN (HUMAN), 25%, 50ML: HCPCS | Performed by: INTERNAL MEDICINE

## 2023-06-25 PROCEDURE — 85379 FIBRIN DEGRADATION QUANT: CPT

## 2023-06-25 PROCEDURE — 3E0333Z INTRODUCTION OF ANTI-INFLAMMATORY INTO PERIPHERAL VEIN, PERCUTANEOUS APPROACH: ICD-10-PCS | Performed by: INTERNAL MEDICINE

## 2023-06-25 PROCEDURE — 85025 COMPLETE CBC W/AUTO DIFF WBC: CPT

## 2023-06-25 PROCEDURE — 82962 GLUCOSE BLOOD TEST: CPT

## 2023-06-25 PROCEDURE — 84145 PROCALCITONIN (PCT): CPT

## 2023-06-25 PROCEDURE — 6370000000 HC RX 637 (ALT 250 FOR IP): Performed by: INTERNAL MEDICINE

## 2023-06-25 PROCEDURE — 2580000003 HC RX 258: Performed by: INTERNAL MEDICINE

## 2023-06-25 PROCEDURE — 0202U NFCT DS 22 TRGT SARS-COV-2: CPT

## 2023-06-25 PROCEDURE — 36415 COLL VENOUS BLD VENIPUNCTURE: CPT

## 2023-06-25 PROCEDURE — 2500000003 HC RX 250 WO HCPCS: Performed by: INTERNAL MEDICINE

## 2023-06-25 PROCEDURE — 83615 LACTATE (LD) (LDH) ENZYME: CPT

## 2023-06-25 PROCEDURE — 6360000002 HC RX W HCPCS: Performed by: INTERNAL MEDICINE

## 2023-06-25 PROCEDURE — 86140 C-REACTIVE PROTEIN: CPT

## 2023-06-25 PROCEDURE — 80069 RENAL FUNCTION PANEL: CPT

## 2023-06-25 PROCEDURE — 80202 ASSAY OF VANCOMYCIN: CPT

## 2023-06-25 RX ORDER — ALBUMIN (HUMAN) 12.5 G/50ML
25 SOLUTION INTRAVENOUS ONCE
Status: COMPLETED | OUTPATIENT
Start: 2023-06-25 | End: 2023-06-25

## 2023-06-25 RX ORDER — ROSUVASTATIN CALCIUM 20 MG/1
20 TABLET, COATED ORAL DAILY
Status: DISCONTINUED | OUTPATIENT
Start: 2023-06-26 | End: 2023-06-27

## 2023-06-25 RX ORDER — ACETAMINOPHEN 650 MG/1
650 SUPPOSITORY RECTAL EVERY 6 HOURS PRN
Status: DISCONTINUED | OUTPATIENT
Start: 2023-06-25 | End: 2023-06-30 | Stop reason: HOSPADM

## 2023-06-25 RX ORDER — POTASSIUM CHLORIDE 20 MEQ/1
40 TABLET, EXTENDED RELEASE ORAL ONCE
Status: COMPLETED | OUTPATIENT
Start: 2023-06-25 | End: 2023-06-25

## 2023-06-25 RX ORDER — ACETAMINOPHEN 325 MG/1
650 TABLET ORAL EVERY 6 HOURS PRN
Status: DISCONTINUED | OUTPATIENT
Start: 2023-06-25 | End: 2023-06-30 | Stop reason: HOSPADM

## 2023-06-25 RX ORDER — GUAIFENESIN 600 MG/1
600 TABLET, EXTENDED RELEASE ORAL 2 TIMES DAILY
Status: DISCONTINUED | OUTPATIENT
Start: 2023-06-25 | End: 2023-06-30 | Stop reason: HOSPADM

## 2023-06-25 RX ORDER — METRONIDAZOLE 500 MG/100ML
500 INJECTION, SOLUTION INTRAVENOUS EVERY 8 HOURS
Status: COMPLETED | OUTPATIENT
Start: 2023-06-25 | End: 2023-06-28

## 2023-06-25 RX ORDER — DEXAMETHASONE SODIUM PHOSPHATE 4 MG/ML
4 INJECTION, SOLUTION INTRA-ARTICULAR; INTRALESIONAL; INTRAMUSCULAR; INTRAVENOUS; SOFT TISSUE EVERY 24 HOURS
Status: DISCONTINUED | OUTPATIENT
Start: 2023-06-25 | End: 2023-06-26

## 2023-06-25 RX ADMIN — ASPIRIN 81 MG: 81 TABLET, COATED ORAL at 08:52

## 2023-06-25 RX ADMIN — SODIUM CHLORIDE, PRESERVATIVE FREE 10 ML: 5 INJECTION INTRAVENOUS at 09:52

## 2023-06-25 RX ADMIN — VANCOMYCIN HYDROCHLORIDE 750 MG: 750 INJECTION, POWDER, LYOPHILIZED, FOR SOLUTION INTRAVENOUS at 09:51

## 2023-06-25 RX ADMIN — METRONIDAZOLE 500 MG: 500 INJECTION, SOLUTION INTRAVENOUS at 23:09

## 2023-06-25 RX ADMIN — ENOXAPARIN SODIUM 30 MG: 100 INJECTION SUBCUTANEOUS at 08:52

## 2023-06-25 RX ADMIN — Medication 1 UNITS: at 16:46

## 2023-06-25 RX ADMIN — Medication 10 UNITS: at 18:52

## 2023-06-25 RX ADMIN — DEXAMETHASONE SODIUM PHOSPHATE 4 MG: 4 INJECTION, SOLUTION INTRA-ARTICULAR; INTRALESIONAL; INTRAMUSCULAR; INTRAVENOUS; SOFT TISSUE at 18:52

## 2023-06-25 RX ADMIN — BUMETANIDE 1 MG: 0.25 INJECTION INTRAMUSCULAR; INTRAVENOUS at 09:52

## 2023-06-25 RX ADMIN — MORPHINE SULFATE 45 MG: 15 TABLET, FILM COATED, EXTENDED RELEASE ORAL at 20:56

## 2023-06-25 RX ADMIN — FAMOTIDINE 10 MG: 20 TABLET ORAL at 08:52

## 2023-06-25 RX ADMIN — BUMETANIDE 1 MG: 0.25 INJECTION INTRAMUSCULAR; INTRAVENOUS at 20:56

## 2023-06-25 RX ADMIN — GUAIFENESIN 600 MG: 600 TABLET, EXTENDED RELEASE ORAL at 20:56

## 2023-06-25 RX ADMIN — METRONIDAZOLE 500 MG: 500 INJECTION, SOLUTION INTRAVENOUS at 15:32

## 2023-06-25 RX ADMIN — ALBUMIN (HUMAN) 25 G: 0.25 INJECTION, SOLUTION INTRAVENOUS at 08:53

## 2023-06-25 RX ADMIN — ALPRAZOLAM 2 MG: 0.5 TABLET ORAL at 23:09

## 2023-06-25 RX ADMIN — POTASSIUM CHLORIDE 40 MEQ: 20 TABLET, EXTENDED RELEASE ORAL at 18:53

## 2023-06-25 RX ADMIN — SODIUM CHLORIDE, PRESERVATIVE FREE 10 ML: 5 INJECTION INTRAVENOUS at 20:56

## 2023-06-25 RX ADMIN — INSULIN GLARGINE 35 UNITS: 100 INJECTION, SOLUTION SUBCUTANEOUS at 20:56

## 2023-06-25 RX ADMIN — PANTOPRAZOLE SODIUM 40 MG: 40 TABLET, DELAYED RELEASE ORAL at 09:14

## 2023-06-25 RX ADMIN — CEFEPIME 1000 MG: 1 INJECTION, POWDER, FOR SOLUTION INTRAMUSCULAR; INTRAVENOUS at 15:31

## 2023-06-25 RX ADMIN — Medication 1 UNITS: at 12:48

## 2023-06-25 RX ADMIN — METRONIDAZOLE 500 MG: 5 INJECTION, SOLUTION INTRAVENOUS at 07:39

## 2023-06-25 RX ADMIN — ROSUVASTATIN CALCIUM 10 MG: 10 TABLET, FILM COATED ORAL at 08:52

## 2023-06-26 PROBLEM — E10.65 TYPE 1 DIABETES MELLITUS WITH HYPERGLYCEMIA (HCC): Status: ACTIVE | Noted: 2023-06-26

## 2023-06-26 PROBLEM — U07.1 COVID-19: Status: ACTIVE | Noted: 2023-06-26

## 2023-06-26 LAB
25(OH)D3 SERPL-MCNC: 20.3 NG/ML (ref 30–100)
ALBUMIN SERPL-MCNC: 2.9 G/DL (ref 3.5–5)
ALBUMIN SERPL-MCNC: 3 G/DL (ref 3.5–5)
ALBUMIN SERPL-MCNC: 3.1 G/DL (ref 3.5–5)
ANION GAP SERPL CALC-SCNC: 5 MMOL/L (ref 5–15)
ANION GAP SERPL CALC-SCNC: 6 MMOL/L (ref 5–15)
ANION GAP SERPL CALC-SCNC: 7 MMOL/L (ref 5–15)
ANION GAP SERPL CALC-SCNC: 7 MMOL/L (ref 5–15)
BASOPHILS # BLD: 0 K/UL (ref 0–0.1)
BASOPHILS NFR BLD: 0 % (ref 0–1)
BUN SERPL-MCNC: 22 MG/DL (ref 6–20)
BUN SERPL-MCNC: 23 MG/DL (ref 6–20)
BUN SERPL-MCNC: 24 MG/DL (ref 6–20)
BUN SERPL-MCNC: 24 MG/DL (ref 6–20)
BUN/CREAT SERPL: 10 (ref 12–20)
BUN/CREAT SERPL: 11 (ref 12–20)
CALCIUM SERPL-MCNC: 8 MG/DL (ref 8.5–10.1)
CALCIUM SERPL-MCNC: 8.4 MG/DL (ref 8.5–10.1)
CALCIUM SERPL-MCNC: 8.5 MG/DL (ref 8.5–10.1)
CALCIUM SERPL-MCNC: 8.6 MG/DL (ref 8.5–10.1)
CHLORIDE SERPL-SCNC: 89 MMOL/L (ref 97–108)
CHLORIDE SERPL-SCNC: 90 MMOL/L (ref 97–108)
CO2 SERPL-SCNC: 29 MMOL/L (ref 21–32)
CO2 SERPL-SCNC: 30 MMOL/L (ref 21–32)
CO2 SERPL-SCNC: 31 MMOL/L (ref 21–32)
CO2 SERPL-SCNC: 33 MMOL/L (ref 21–32)
COMMENT:: NORMAL
CREAT SERPL-MCNC: 2.15 MG/DL (ref 0.55–1.02)
CREAT SERPL-MCNC: 2.21 MG/DL (ref 0.55–1.02)
CREAT SERPL-MCNC: 2.22 MG/DL (ref 0.55–1.02)
CREAT SERPL-MCNC: 2.29 MG/DL (ref 0.55–1.02)
CRP SERPL-MCNC: 10.1 MG/DL (ref 0–0.6)
CRP SERPL-MCNC: 9.93 MG/DL (ref 0–0.6)
D DIMER PPP FEU-MCNC: 0.74 MG/L FEU (ref 0–0.65)
D DIMER PPP FEU-MCNC: 0.83 MG/L FEU (ref 0–0.65)
DIFFERENTIAL METHOD BLD: ABNORMAL
EOSINOPHIL # BLD: 0 K/UL (ref 0–0.4)
EOSINOPHIL NFR BLD: 0 % (ref 0–7)
ERYTHROCYTE [DISTWIDTH] IN BLOOD BY AUTOMATED COUNT: 20.8 % (ref 11.5–14.5)
FERRITIN SERPL-MCNC: 34 NG/ML (ref 26–388)
FIBRINOGEN PPP-MCNC: 413 MG/DL (ref 200–475)
FOLATE SERPL-MCNC: 14.4 NG/ML (ref 5–21)
GLUCOSE BLD STRIP.AUTO-MCNC: 232 MG/DL (ref 65–117)
GLUCOSE BLD STRIP.AUTO-MCNC: 384 MG/DL (ref 65–117)
GLUCOSE BLD STRIP.AUTO-MCNC: 397 MG/DL (ref 65–117)
GLUCOSE BLD STRIP.AUTO-MCNC: 418 MG/DL (ref 65–117)
GLUCOSE SERPL-MCNC: 314 MG/DL (ref 65–100)
GLUCOSE SERPL-MCNC: 357 MG/DL (ref 65–100)
GLUCOSE SERPL-MCNC: 357 MG/DL (ref 65–100)
GLUCOSE SERPL-MCNC: 367 MG/DL (ref 65–100)
HCT VFR BLD AUTO: 29.4 % (ref 35–47)
HGB BLD-MCNC: 8.6 G/DL (ref 11.5–16)
IMM GRANULOCYTES # BLD AUTO: 0 K/UL (ref 0–0.04)
IMM GRANULOCYTES NFR BLD AUTO: 0 % (ref 0–0.5)
INR PPP: 1.4 (ref 0.9–1.1)
IRON SATN MFR SERPL: 5 % (ref 20–50)
IRON SERPL-MCNC: 17 UG/DL (ref 35–150)
LDH SERPL L TO P-CCNC: 179 U/L (ref 81–246)
LYMPHOCYTES # BLD: 0.6 K/UL (ref 0.8–3.5)
LYMPHOCYTES NFR BLD: 10 % (ref 12–49)
MAGNESIUM SERPL-MCNC: 2 MG/DL (ref 1.6–2.4)
MCH RBC QN AUTO: 20.9 PG (ref 26–34)
MCHC RBC AUTO-ENTMCNC: 29.3 G/DL (ref 30–36.5)
MCV RBC AUTO: 71.5 FL (ref 80–99)
MONOCYTES # BLD: 0.2 K/UL (ref 0–1)
MONOCYTES NFR BLD: 3 % (ref 5–13)
NEUTS SEG # BLD: 4.9 K/UL (ref 1.8–8)
NEUTS SEG NFR BLD: 87 % (ref 32–75)
NRBC # BLD: 0 K/UL (ref 0–0.01)
NRBC BLD-RTO: 0 PER 100 WBC
PHOSPHATE SERPL-MCNC: 2.2 MG/DL (ref 2.6–4.7)
PHOSPHATE SERPL-MCNC: 2.9 MG/DL (ref 2.6–4.7)
PHOSPHATE SERPL-MCNC: 3 MG/DL (ref 2.6–4.7)
PLATELET # BLD AUTO: 275 K/UL (ref 150–400)
PMV BLD AUTO: 9.7 FL (ref 8.9–12.9)
POTASSIUM SERPL-SCNC: 3.3 MMOL/L (ref 3.5–5.1)
POTASSIUM SERPL-SCNC: 3.4 MMOL/L (ref 3.5–5.1)
POTASSIUM SERPL-SCNC: 3.5 MMOL/L (ref 3.5–5.1)
POTASSIUM SERPL-SCNC: 3.7 MMOL/L (ref 3.5–5.1)
PROTHROMBIN TIME: 14.6 SEC (ref 9–11.1)
RBC # BLD AUTO: 4.11 M/UL (ref 3.8–5.2)
RBC MORPH BLD: ABNORMAL
RBC MORPH BLD: ABNORMAL
SERVICE CMNT-IMP: ABNORMAL
SODIUM SERPL-SCNC: 126 MMOL/L (ref 136–145)
SODIUM SERPL-SCNC: 127 MMOL/L (ref 136–145)
SPECIMEN HOLD: NORMAL
TIBC SERPL-MCNC: 345 UG/DL (ref 250–450)
TIBC SERPL-MCNC: 349 UG/DL (ref 250–450)
VANCOMYCIN SERPL-MCNC: 21.9 UG/ML
VIT B12 SERPL-MCNC: 341 PG/ML (ref 193–986)
WBC # BLD AUTO: 5.7 K/UL (ref 3.6–11)

## 2023-06-26 PROCEDURE — 6370000000 HC RX 637 (ALT 250 FOR IP): Performed by: NURSE PRACTITIONER

## 2023-06-26 PROCEDURE — 82306 VITAMIN D 25 HYDROXY: CPT

## 2023-06-26 PROCEDURE — 85610 PROTHROMBIN TIME: CPT

## 2023-06-26 PROCEDURE — 82962 GLUCOSE BLOOD TEST: CPT

## 2023-06-26 PROCEDURE — 86140 C-REACTIVE PROTEIN: CPT

## 2023-06-26 PROCEDURE — 80069 RENAL FUNCTION PANEL: CPT

## 2023-06-26 PROCEDURE — 83540 ASSAY OF IRON: CPT

## 2023-06-26 PROCEDURE — 2500000003 HC RX 250 WO HCPCS: Performed by: INTERNAL MEDICINE

## 2023-06-26 PROCEDURE — 99221 1ST HOSP IP/OBS SF/LOW 40: CPT | Performed by: CLINICAL NURSE SPECIALIST

## 2023-06-26 PROCEDURE — 83550 IRON BINDING TEST: CPT

## 2023-06-26 PROCEDURE — 6360000002 HC RX W HCPCS: Performed by: INTERNAL MEDICINE

## 2023-06-26 PROCEDURE — 82746 ASSAY OF FOLIC ACID SERUM: CPT

## 2023-06-26 PROCEDURE — 6370000000 HC RX 637 (ALT 250 FOR IP): Performed by: CLINICAL NURSE SPECIALIST

## 2023-06-26 PROCEDURE — 83735 ASSAY OF MAGNESIUM: CPT

## 2023-06-26 PROCEDURE — 2700000000 HC OXYGEN THERAPY PER DAY

## 2023-06-26 PROCEDURE — 36415 COLL VENOUS BLD VENIPUNCTURE: CPT

## 2023-06-26 PROCEDURE — 6370000000 HC RX 637 (ALT 250 FOR IP): Performed by: INTERNAL MEDICINE

## 2023-06-26 PROCEDURE — 85384 FIBRINOGEN ACTIVITY: CPT

## 2023-06-26 PROCEDURE — XW0DXM6 INTRODUCTION OF BARICITINIB INTO MOUTH AND PHARYNX, EXTERNAL APPROACH, NEW TECHNOLOGY GROUP 6: ICD-10-PCS | Performed by: INTERNAL MEDICINE

## 2023-06-26 PROCEDURE — 2060000000 HC ICU INTERMEDIATE R&B

## 2023-06-26 PROCEDURE — 85379 FIBRIN DEGRADATION QUANT: CPT

## 2023-06-26 PROCEDURE — 2580000003 HC RX 258: Performed by: INTERNAL MEDICINE

## 2023-06-26 PROCEDURE — 80048 BASIC METABOLIC PNL TOTAL CA: CPT

## 2023-06-26 PROCEDURE — 85025 COMPLETE CBC W/AUTO DIFF WBC: CPT

## 2023-06-26 PROCEDURE — 82607 VITAMIN B-12: CPT

## 2023-06-26 RX ORDER — INSULIN LISPRO 100 [IU]/ML
8 INJECTION, SOLUTION INTRAVENOUS; SUBCUTANEOUS ONCE
Status: COMPLETED | OUTPATIENT
Start: 2023-06-26 | End: 2023-06-26

## 2023-06-26 RX ORDER — ENOXAPARIN SODIUM 100 MG/ML
40 INJECTION SUBCUTANEOUS DAILY
Status: DISCONTINUED | OUTPATIENT
Start: 2023-06-26 | End: 2023-06-30 | Stop reason: HOSPADM

## 2023-06-26 RX ORDER — INSULIN LISPRO 100 [IU]/ML
10 INJECTION, SOLUTION INTRAVENOUS; SUBCUTANEOUS
Status: DISCONTINUED | OUTPATIENT
Start: 2023-06-26 | End: 2023-06-29

## 2023-06-26 RX ORDER — DEXAMETHASONE SODIUM PHOSPHATE 4 MG/ML
6 INJECTION, SOLUTION INTRA-ARTICULAR; INTRALESIONAL; INTRAMUSCULAR; INTRAVENOUS; SOFT TISSUE EVERY 24 HOURS
Status: DISCONTINUED | OUTPATIENT
Start: 2023-06-27 | End: 2023-06-28

## 2023-06-26 RX ORDER — INSULIN LISPRO 100 [IU]/ML
10 INJECTION, SOLUTION INTRAVENOUS; SUBCUTANEOUS ONCE
Status: COMPLETED | OUTPATIENT
Start: 2023-06-26 | End: 2023-06-26

## 2023-06-26 RX ORDER — DEXAMETHASONE SODIUM PHOSPHATE 4 MG/ML
4 INJECTION, SOLUTION INTRA-ARTICULAR; INTRALESIONAL; INTRAMUSCULAR; INTRAVENOUS; SOFT TISSUE EVERY 24 HOURS
Status: DISCONTINUED | OUTPATIENT
Start: 2023-06-26 | End: 2023-06-26

## 2023-06-26 RX ORDER — INSULIN GLARGINE 100 [IU]/ML
45 INJECTION, SOLUTION SUBCUTANEOUS NIGHTLY
Status: DISCONTINUED | OUTPATIENT
Start: 2023-06-26 | End: 2023-06-26

## 2023-06-26 RX ORDER — POTASSIUM CHLORIDE 20 MEQ/1
40 TABLET, EXTENDED RELEASE ORAL ONCE
Status: COMPLETED | OUTPATIENT
Start: 2023-06-26 | End: 2023-06-26

## 2023-06-26 RX ORDER — DEXAMETHASONE SODIUM PHOSPHATE 4 MG/ML
6 INJECTION, SOLUTION INTRA-ARTICULAR; INTRALESIONAL; INTRAMUSCULAR; INTRAVENOUS; SOFT TISSUE EVERY 24 HOURS
Status: DISCONTINUED | OUTPATIENT
Start: 2023-06-26 | End: 2023-06-26

## 2023-06-26 RX ORDER — INSULIN GLARGINE 100 [IU]/ML
35 INJECTION, SOLUTION SUBCUTANEOUS NIGHTLY
Status: DISCONTINUED | OUTPATIENT
Start: 2023-06-26 | End: 2023-06-29

## 2023-06-26 RX ADMIN — CEFEPIME 2000 MG: 2 INJECTION, POWDER, FOR SOLUTION INTRAVENOUS at 13:03

## 2023-06-26 RX ADMIN — INSULIN GLARGINE 35 UNITS: 100 INJECTION, SOLUTION SUBCUTANEOUS at 21:18

## 2023-06-26 RX ADMIN — Medication 10 UNITS: at 08:53

## 2023-06-26 RX ADMIN — MORPHINE SULFATE 45 MG: 15 TABLET, FILM COATED, EXTENDED RELEASE ORAL at 21:15

## 2023-06-26 RX ADMIN — ASPIRIN 81 MG: 81 TABLET, COATED ORAL at 08:50

## 2023-06-26 RX ADMIN — Medication 8 UNITS: at 16:57

## 2023-06-26 RX ADMIN — METRONIDAZOLE 500 MG: 500 INJECTION, SOLUTION INTRAVENOUS at 23:49

## 2023-06-26 RX ADMIN — GUAIFENESIN 600 MG: 600 TABLET, EXTENDED RELEASE ORAL at 08:52

## 2023-06-26 RX ADMIN — BUMETANIDE 1 MG: 0.25 INJECTION INTRAMUSCULAR; INTRAVENOUS at 08:52

## 2023-06-26 RX ADMIN — SODIUM CHLORIDE, PRESERVATIVE FREE 10 ML: 5 INJECTION INTRAVENOUS at 08:54

## 2023-06-26 RX ADMIN — Medication 15 UNITS: at 21:18

## 2023-06-26 RX ADMIN — BARICITINIB 2 MG: 2 TABLET, FILM COATED ORAL at 13:04

## 2023-06-26 RX ADMIN — Medication 10 UNITS: at 16:58

## 2023-06-26 RX ADMIN — VANCOMYCIN HYDROCHLORIDE 750 MG: 750 INJECTION, POWDER, LYOPHILIZED, FOR SOLUTION INTRAVENOUS at 04:36

## 2023-06-26 RX ADMIN — Medication 10 UNITS: at 13:04

## 2023-06-26 RX ADMIN — MORPHINE SULFATE 45 MG: 15 TABLET, FILM COATED, EXTENDED RELEASE ORAL at 08:50

## 2023-06-26 RX ADMIN — ENOXAPARIN SODIUM 40 MG: 100 INJECTION SUBCUTANEOUS at 08:50

## 2023-06-26 RX ADMIN — ROSUVASTATIN CALCIUM 20 MG: 20 TABLET, FILM COATED ORAL at 08:50

## 2023-06-26 RX ADMIN — CITALOPRAM HYDROBROMIDE 40 MG: 20 TABLET ORAL at 10:22

## 2023-06-26 RX ADMIN — ALPRAZOLAM 2 MG: 0.5 TABLET ORAL at 21:15

## 2023-06-26 RX ADMIN — PANTOPRAZOLE SODIUM 40 MG: 40 TABLET, DELAYED RELEASE ORAL at 08:52

## 2023-06-26 RX ADMIN — BREXPIPRAZOLE 1 MG: 1 TABLET ORAL at 08:50

## 2023-06-26 RX ADMIN — SODIUM CHLORIDE, PRESERVATIVE FREE 10 ML: 5 INJECTION INTRAVENOUS at 21:19

## 2023-06-26 RX ADMIN — POTASSIUM CHLORIDE 40 MEQ: 20 TABLET, EXTENDED RELEASE ORAL at 10:22

## 2023-06-26 RX ADMIN — GUAIFENESIN 600 MG: 600 TABLET, EXTENDED RELEASE ORAL at 21:16

## 2023-06-26 RX ADMIN — FAMOTIDINE 10 MG: 20 TABLET ORAL at 08:52

## 2023-06-26 RX ADMIN — BUMETANIDE 1 MG: 0.25 INJECTION INTRAMUSCULAR; INTRAVENOUS at 21:15

## 2023-06-26 RX ADMIN — METRONIDAZOLE 500 MG: 500 INJECTION, SOLUTION INTRAVENOUS at 16:58

## 2023-06-26 RX ADMIN — METRONIDAZOLE 500 MG: 500 INJECTION, SOLUTION INTRAVENOUS at 08:58

## 2023-06-26 ASSESSMENT — PAIN DESCRIPTION - ONSET: ONSET: ON-GOING

## 2023-06-26 ASSESSMENT — PAIN DESCRIPTION - ORIENTATION
ORIENTATION: LOWER
ORIENTATION: LOWER

## 2023-06-26 ASSESSMENT — PAIN SCALES - GENERAL
PAINLEVEL_OUTOF10: 0
PAINLEVEL_OUTOF10: 2
PAINLEVEL_OUTOF10: 5
PAINLEVEL_OUTOF10: 0
PAINLEVEL_OUTOF10: 7

## 2023-06-26 ASSESSMENT — PAIN DESCRIPTION - LOCATION
LOCATION: PELVIS
LOCATION: PELVIS

## 2023-06-26 ASSESSMENT — PAIN DESCRIPTION - DESCRIPTORS
DESCRIPTORS: CRAMPING
DESCRIPTORS: ACHING

## 2023-06-26 ASSESSMENT — PAIN DESCRIPTION - PAIN TYPE: TYPE: CHRONIC PAIN

## 2023-06-27 ENCOUNTER — TELEPHONE (OUTPATIENT)
Age: 53
End: 2023-06-27

## 2023-06-27 LAB
ALBUMIN SERPL-MCNC: 3.1 G/DL (ref 3.5–5)
ALBUMIN/GLOB SERPL: 0.8 (ref 1.1–2.2)
ALP SERPL-CCNC: 73 U/L (ref 45–117)
ALT SERPL-CCNC: 20 U/L (ref 12–78)
ANION GAP SERPL CALC-SCNC: 7 MMOL/L (ref 5–15)
AST SERPL-CCNC: 21 U/L (ref 15–37)
BASOPHILS # BLD: 0 K/UL (ref 0–0.1)
BASOPHILS NFR BLD: 0 % (ref 0–1)
BILIRUB DIRECT SERPL-MCNC: 0.1 MG/DL (ref 0–0.2)
BILIRUB SERPL-MCNC: 0.4 MG/DL (ref 0.2–1)
BUN SERPL-MCNC: 26 MG/DL (ref 6–20)
BUN/CREAT SERPL: 12 (ref 12–20)
CALCIUM SERPL-MCNC: 9.1 MG/DL (ref 8.5–10.1)
CHLORIDE SERPL-SCNC: 92 MMOL/L (ref 97–108)
CO2 SERPL-SCNC: 33 MMOL/L (ref 21–32)
CREAT SERPL-MCNC: 2.16 MG/DL (ref 0.55–1.02)
CRP SERPL-MCNC: 6.86 MG/DL (ref 0–0.6)
D DIMER PPP FEU-MCNC: 0.77 MG/L FEU (ref 0–0.65)
DIFFERENTIAL METHOD BLD: ABNORMAL
EOSINOPHIL # BLD: 0.1 K/UL (ref 0–0.4)
EOSINOPHIL NFR BLD: 2 % (ref 0–7)
ERYTHROCYTE [DISTWIDTH] IN BLOOD BY AUTOMATED COUNT: 21.4 % (ref 11.5–14.5)
GLOBULIN SER CALC-MCNC: 3.8 G/DL (ref 2–4)
GLUCOSE BLD STRIP.AUTO-MCNC: 114 MG/DL (ref 65–117)
GLUCOSE BLD STRIP.AUTO-MCNC: 117 MG/DL (ref 65–117)
GLUCOSE BLD STRIP.AUTO-MCNC: 179 MG/DL (ref 65–117)
GLUCOSE BLD STRIP.AUTO-MCNC: 360 MG/DL (ref 65–117)
GLUCOSE BLD STRIP.AUTO-MCNC: 43 MG/DL (ref 65–117)
GLUCOSE BLD STRIP.AUTO-MCNC: 44 MG/DL (ref 65–117)
GLUCOSE BLD STRIP.AUTO-MCNC: 454 MG/DL (ref 65–117)
GLUCOSE BLD STRIP.AUTO-MCNC: 47 MG/DL (ref 65–117)
GLUCOSE BLD STRIP.AUTO-MCNC: 48 MG/DL (ref 65–117)
GLUCOSE SERPL-MCNC: 130 MG/DL (ref 65–100)
HCT VFR BLD AUTO: 30.9 % (ref 35–47)
HGB BLD-MCNC: 8.8 G/DL (ref 11.5–16)
IMM GRANULOCYTES # BLD AUTO: 0 K/UL (ref 0–0.04)
IMM GRANULOCYTES NFR BLD AUTO: 0 % (ref 0–0.5)
LYMPHOCYTES # BLD: 1.5 K/UL (ref 0.8–3.5)
LYMPHOCYTES NFR BLD: 25 % (ref 12–49)
MCH RBC QN AUTO: 20.7 PG (ref 26–34)
MCHC RBC AUTO-ENTMCNC: 28.5 G/DL (ref 30–36.5)
MCV RBC AUTO: 72.5 FL (ref 80–99)
MONOCYTES # BLD: 0.6 K/UL (ref 0–1)
MONOCYTES NFR BLD: 10 % (ref 5–13)
NEUTS SEG # BLD: 3.8 K/UL (ref 1.8–8)
NEUTS SEG NFR BLD: 63 % (ref 32–75)
NRBC # BLD: 0 K/UL (ref 0–0.01)
NRBC BLD-RTO: 0 PER 100 WBC
PHOSPHATE SERPL-MCNC: 2.6 MG/DL (ref 2.6–4.7)
PLATELET # BLD AUTO: 302 K/UL (ref 150–400)
PMV BLD AUTO: 9.5 FL (ref 8.9–12.9)
POTASSIUM SERPL-SCNC: 3.2 MMOL/L (ref 3.5–5.1)
PROT SERPL-MCNC: 6.9 G/DL (ref 6.4–8.2)
RBC # BLD AUTO: 4.26 M/UL (ref 3.8–5.2)
RBC MORPH BLD: ABNORMAL
SERVICE CMNT-IMP: ABNORMAL
SERVICE CMNT-IMP: NORMAL
SERVICE CMNT-IMP: NORMAL
SODIUM SERPL-SCNC: 132 MMOL/L (ref 136–145)
WBC # BLD AUTO: 6 K/UL (ref 3.6–11)

## 2023-06-27 PROCEDURE — 2580000003 HC RX 258: Performed by: INTERNAL MEDICINE

## 2023-06-27 PROCEDURE — 85379 FIBRIN DEGRADATION QUANT: CPT

## 2023-06-27 PROCEDURE — 2060000000 HC ICU INTERMEDIATE R&B

## 2023-06-27 PROCEDURE — 80076 HEPATIC FUNCTION PANEL: CPT

## 2023-06-27 PROCEDURE — 6360000002 HC RX W HCPCS: Performed by: INTERNAL MEDICINE

## 2023-06-27 PROCEDURE — 80048 BASIC METABOLIC PNL TOTAL CA: CPT

## 2023-06-27 PROCEDURE — 2700000000 HC OXYGEN THERAPY PER DAY

## 2023-06-27 PROCEDURE — 84100 ASSAY OF PHOSPHORUS: CPT

## 2023-06-27 PROCEDURE — 85025 COMPLETE CBC W/AUTO DIFF WBC: CPT

## 2023-06-27 PROCEDURE — 86140 C-REACTIVE PROTEIN: CPT

## 2023-06-27 PROCEDURE — 6370000000 HC RX 637 (ALT 250 FOR IP): Performed by: CLINICAL NURSE SPECIALIST

## 2023-06-27 PROCEDURE — 2500000003 HC RX 250 WO HCPCS: Performed by: INTERNAL MEDICINE

## 2023-06-27 PROCEDURE — 82962 GLUCOSE BLOOD TEST: CPT

## 2023-06-27 PROCEDURE — 6370000000 HC RX 637 (ALT 250 FOR IP): Performed by: INTERNAL MEDICINE

## 2023-06-27 PROCEDURE — 36415 COLL VENOUS BLD VENIPUNCTURE: CPT

## 2023-06-27 RX ORDER — ROSUVASTATIN CALCIUM 40 MG/1
40 TABLET, COATED ORAL DAILY
Status: DISCONTINUED | OUTPATIENT
Start: 2023-06-28 | End: 2023-06-30 | Stop reason: HOSPADM

## 2023-06-27 RX ORDER — POTASSIUM CHLORIDE 20 MEQ/1
40 TABLET, EXTENDED RELEASE ORAL ONCE
Status: COMPLETED | OUTPATIENT
Start: 2023-06-27 | End: 2023-06-27

## 2023-06-27 RX ORDER — BUMETANIDE 1 MG/1
1 TABLET ORAL 2 TIMES DAILY
Status: DISCONTINUED | OUTPATIENT
Start: 2023-06-27 | End: 2023-06-30 | Stop reason: HOSPADM

## 2023-06-27 RX ADMIN — METRONIDAZOLE 500 MG: 500 INJECTION, SOLUTION INTRAVENOUS at 23:10

## 2023-06-27 RX ADMIN — Medication 15 UNITS: at 08:23

## 2023-06-27 RX ADMIN — BREXPIPRAZOLE 1 MG: 1 TABLET ORAL at 10:03

## 2023-06-27 RX ADMIN — METRONIDAZOLE 500 MG: 500 INJECTION, SOLUTION INTRAVENOUS at 15:21

## 2023-06-27 RX ADMIN — BARICITINIB 2 MG: 2 TABLET, FILM COATED ORAL at 10:13

## 2023-06-27 RX ADMIN — ENOXAPARIN SODIUM 40 MG: 100 INJECTION SUBCUTANEOUS at 08:20

## 2023-06-27 RX ADMIN — GUAIFENESIN 600 MG: 600 TABLET, EXTENDED RELEASE ORAL at 08:21

## 2023-06-27 RX ADMIN — Medication 16 G: at 16:38

## 2023-06-27 RX ADMIN — CITALOPRAM HYDROBROMIDE 40 MG: 20 TABLET ORAL at 08:19

## 2023-06-27 RX ADMIN — BUMETANIDE 1 MG: 1 TABLET ORAL at 21:32

## 2023-06-27 RX ADMIN — GABAPENTIN 300 MG: 300 CAPSULE ORAL at 13:37

## 2023-06-27 RX ADMIN — GABAPENTIN 300 MG: 300 CAPSULE ORAL at 21:31

## 2023-06-27 RX ADMIN — SODIUM CHLORIDE, PRESERVATIVE FREE 10 ML: 5 INJECTION INTRAVENOUS at 21:36

## 2023-06-27 RX ADMIN — ROSUVASTATIN CALCIUM 20 MG: 20 TABLET, FILM COATED ORAL at 08:21

## 2023-06-27 RX ADMIN — ASPIRIN 81 MG: 81 TABLET, COATED ORAL at 08:21

## 2023-06-27 RX ADMIN — MORPHINE SULFATE 45 MG: 15 TABLET, FILM COATED, EXTENDED RELEASE ORAL at 08:20

## 2023-06-27 RX ADMIN — MORPHINE SULFATE 45 MG: 15 TABLET, FILM COATED, EXTENDED RELEASE ORAL at 21:32

## 2023-06-27 RX ADMIN — BUMETANIDE 1 MG: 0.25 INJECTION INTRAMUSCULAR; INTRAVENOUS at 08:21

## 2023-06-27 RX ADMIN — ARIPIPRAZOLE 30 MG: 5 TABLET ORAL at 10:13

## 2023-06-27 RX ADMIN — Medication 15 UNITS: at 21:30

## 2023-06-27 RX ADMIN — BUSPIRONE HYDROCHLORIDE 15 MG: 10 TABLET ORAL at 13:37

## 2023-06-27 RX ADMIN — GUAIFENESIN 600 MG: 600 TABLET, EXTENDED RELEASE ORAL at 21:33

## 2023-06-27 RX ADMIN — ALPRAZOLAM 2 MG: 0.5 TABLET ORAL at 21:31

## 2023-06-27 RX ADMIN — POLYETHYLENE GLYCOL 3350 17 G: 17 POWDER, FOR SOLUTION ORAL at 21:31

## 2023-06-27 RX ADMIN — POTASSIUM CHLORIDE 40 MEQ: 20 TABLET, EXTENDED RELEASE ORAL at 11:16

## 2023-06-27 RX ADMIN — DEXAMETHASONE SODIUM PHOSPHATE 6 MG: 4 INJECTION, SOLUTION INTRA-ARTICULAR; INTRALESIONAL; INTRAMUSCULAR; INTRAVENOUS; SOFT TISSUE at 16:31

## 2023-06-27 RX ADMIN — Medication 10 UNITS: at 08:22

## 2023-06-27 RX ADMIN — BUSPIRONE HYDROCHLORIDE 15 MG: 10 TABLET ORAL at 21:32

## 2023-06-27 RX ADMIN — CEFEPIME 2000 MG: 2 INJECTION, POWDER, FOR SOLUTION INTRAVENOUS at 13:44

## 2023-06-27 RX ADMIN — Medication 10 UNITS: at 11:18

## 2023-06-27 RX ADMIN — INSULIN GLARGINE 35 UNITS: 100 INJECTION, SOLUTION SUBCUTANEOUS at 21:30

## 2023-06-27 RX ADMIN — METRONIDAZOLE 500 MG: 500 INJECTION, SOLUTION INTRAVENOUS at 08:23

## 2023-06-27 RX ADMIN — FAMOTIDINE 10 MG: 20 TABLET ORAL at 08:19

## 2023-06-27 RX ADMIN — FLUTICASONE PROPIONATE 2 SPRAY: 50 SPRAY, METERED NASAL at 08:20

## 2023-06-27 RX ADMIN — SODIUM CHLORIDE, PRESERVATIVE FREE 10 ML: 5 INJECTION INTRAVENOUS at 08:24

## 2023-06-27 ASSESSMENT — PAIN SCALES - GENERAL: PAINLEVEL_OUTOF10: 7

## 2023-06-27 ASSESSMENT — PAIN DESCRIPTION - DESCRIPTORS: DESCRIPTORS: ACHING

## 2023-06-27 ASSESSMENT — PAIN DESCRIPTION - LOCATION: LOCATION: PELVIS

## 2023-06-28 LAB
ALBUMIN SERPL-MCNC: 3.2 G/DL (ref 3.5–5)
ANION GAP SERPL CALC-SCNC: 6 MMOL/L (ref 5–15)
BASOPHILS # BLD: 0 K/UL (ref 0–0.1)
BASOPHILS NFR BLD: 0 % (ref 0–1)
BUN SERPL-MCNC: 23 MG/DL (ref 6–20)
BUN/CREAT SERPL: 12 (ref 12–20)
CALCIUM SERPL-MCNC: 8.9 MG/DL (ref 8.5–10.1)
CHLORIDE SERPL-SCNC: 90 MMOL/L (ref 97–108)
CO2 SERPL-SCNC: 38 MMOL/L (ref 21–32)
CREAT SERPL-MCNC: 1.85 MG/DL (ref 0.55–1.02)
DIFFERENTIAL METHOD BLD: ABNORMAL
EOSINOPHIL # BLD: 0 K/UL (ref 0–0.4)
EOSINOPHIL NFR BLD: 0 % (ref 0–7)
ERYTHROCYTE [DISTWIDTH] IN BLOOD BY AUTOMATED COUNT: 21.7 % (ref 11.5–14.5)
GLUCOSE BLD STRIP.AUTO-MCNC: 145 MG/DL (ref 65–117)
GLUCOSE BLD STRIP.AUTO-MCNC: 277 MG/DL (ref 65–117)
GLUCOSE BLD STRIP.AUTO-MCNC: 300 MG/DL (ref 65–117)
GLUCOSE BLD STRIP.AUTO-MCNC: 321 MG/DL (ref 65–117)
GLUCOSE BLD STRIP.AUTO-MCNC: 323 MG/DL (ref 65–117)
GLUCOSE BLD STRIP.AUTO-MCNC: 63 MG/DL (ref 65–117)
GLUCOSE BLD STRIP.AUTO-MCNC: 71 MG/DL (ref 65–117)
GLUCOSE SERPL-MCNC: 180 MG/DL (ref 65–100)
HCT VFR BLD AUTO: 32.4 % (ref 35–47)
HGB BLD-MCNC: 9.1 G/DL (ref 11.5–16)
IMM GRANULOCYTES # BLD AUTO: 0 K/UL (ref 0–0.04)
IMM GRANULOCYTES NFR BLD AUTO: 0 % (ref 0–0.5)
LYMPHOCYTES # BLD: 1.1 K/UL (ref 0.8–3.5)
LYMPHOCYTES NFR BLD: 25 % (ref 12–49)
MCH RBC QN AUTO: 20.5 PG (ref 26–34)
MCHC RBC AUTO-ENTMCNC: 28.1 G/DL (ref 30–36.5)
MCV RBC AUTO: 73.1 FL (ref 80–99)
MONOCYTES # BLD: 0.5 K/UL (ref 0–1)
MONOCYTES NFR BLD: 12 % (ref 5–13)
NEUTS SEG # BLD: 2.6 K/UL (ref 1.8–8)
NEUTS SEG NFR BLD: 63 % (ref 32–75)
NRBC # BLD: 0 K/UL (ref 0–0.01)
NRBC BLD-RTO: 0 PER 100 WBC
PHOSPHATE SERPL-MCNC: 3 MG/DL (ref 2.6–4.7)
PLATELET # BLD AUTO: 322 K/UL (ref 150–400)
PMV BLD AUTO: 9.4 FL (ref 8.9–12.9)
POTASSIUM SERPL-SCNC: 3.7 MMOL/L (ref 3.5–5.1)
RBC # BLD AUTO: 4.43 M/UL (ref 3.8–5.2)
RBC MORPH BLD: ABNORMAL
SERVICE CMNT-IMP: ABNORMAL
SERVICE CMNT-IMP: NORMAL
SODIUM SERPL-SCNC: 134 MMOL/L (ref 136–145)
WBC # BLD AUTO: 4.2 K/UL (ref 3.6–11)

## 2023-06-28 PROCEDURE — 36415 COLL VENOUS BLD VENIPUNCTURE: CPT

## 2023-06-28 PROCEDURE — 2700000000 HC OXYGEN THERAPY PER DAY

## 2023-06-28 PROCEDURE — 6370000000 HC RX 637 (ALT 250 FOR IP): Performed by: INTERNAL MEDICINE

## 2023-06-28 PROCEDURE — 80069 RENAL FUNCTION PANEL: CPT

## 2023-06-28 PROCEDURE — 2060000000 HC ICU INTERMEDIATE R&B

## 2023-06-28 PROCEDURE — 2580000003 HC RX 258: Performed by: INTERNAL MEDICINE

## 2023-06-28 PROCEDURE — 85025 COMPLETE CBC W/AUTO DIFF WBC: CPT

## 2023-06-28 PROCEDURE — 94760 N-INVAS EAR/PLS OXIMETRY 1: CPT

## 2023-06-28 PROCEDURE — 6370000000 HC RX 637 (ALT 250 FOR IP): Performed by: CLINICAL NURSE SPECIALIST

## 2023-06-28 PROCEDURE — 6360000002 HC RX W HCPCS: Performed by: INTERNAL MEDICINE

## 2023-06-28 PROCEDURE — 82962 GLUCOSE BLOOD TEST: CPT

## 2023-06-28 RX ORDER — DEXAMETHASONE SODIUM PHOSPHATE 4 MG/ML
6 INJECTION, SOLUTION INTRA-ARTICULAR; INTRALESIONAL; INTRAMUSCULAR; INTRAVENOUS; SOFT TISSUE EVERY 24 HOURS
Status: DISCONTINUED | OUTPATIENT
Start: 2023-06-29 | End: 2023-06-28

## 2023-06-28 RX ORDER — DEXAMETHASONE SODIUM PHOSPHATE 4 MG/ML
6 INJECTION, SOLUTION INTRA-ARTICULAR; INTRALESIONAL; INTRAMUSCULAR; INTRAVENOUS; SOFT TISSUE EVERY 24 HOURS
Status: DISCONTINUED | OUTPATIENT
Start: 2023-06-28 | End: 2023-06-29

## 2023-06-28 RX ADMIN — MORPHINE SULFATE 45 MG: 15 TABLET, FILM COATED, EXTENDED RELEASE ORAL at 08:45

## 2023-06-28 RX ADMIN — Medication 3 UNITS: at 08:49

## 2023-06-28 RX ADMIN — BUSPIRONE HYDROCHLORIDE 15 MG: 10 TABLET ORAL at 08:43

## 2023-06-28 RX ADMIN — GABAPENTIN 300 MG: 300 CAPSULE ORAL at 08:44

## 2023-06-28 RX ADMIN — BUMETANIDE 1 MG: 1 TABLET ORAL at 08:44

## 2023-06-28 RX ADMIN — INSULIN GLARGINE 35 UNITS: 100 INJECTION, SOLUTION SUBCUTANEOUS at 21:43

## 2023-06-28 RX ADMIN — BARICITINIB 2 MG: 2 TABLET, FILM COATED ORAL at 16:27

## 2023-06-28 RX ADMIN — GABAPENTIN 300 MG: 300 CAPSULE ORAL at 13:36

## 2023-06-28 RX ADMIN — BREXPIPRAZOLE 1 MG: 1 TABLET ORAL at 08:45

## 2023-06-28 RX ADMIN — DEXAMETHASONE SODIUM PHOSPHATE 6 MG: 4 INJECTION, SOLUTION INTRAMUSCULAR; INTRAVENOUS at 18:04

## 2023-06-28 RX ADMIN — Medication 4 UNITS: at 21:44

## 2023-06-28 RX ADMIN — SODIUM CHLORIDE, PRESERVATIVE FREE 10 ML: 5 INJECTION INTRAVENOUS at 21:47

## 2023-06-28 RX ADMIN — ARIPIPRAZOLE 30 MG: 5 TABLET ORAL at 08:45

## 2023-06-28 RX ADMIN — CEFEPIME 2000 MG: 2 INJECTION, POWDER, FOR SOLUTION INTRAVENOUS at 13:36

## 2023-06-28 RX ADMIN — POLYETHYLENE GLYCOL 3350 17 G: 17 POWDER, FOR SOLUTION ORAL at 12:36

## 2023-06-28 RX ADMIN — BUSPIRONE HYDROCHLORIDE 15 MG: 10 TABLET ORAL at 13:36

## 2023-06-28 RX ADMIN — GABAPENTIN 300 MG: 300 CAPSULE ORAL at 21:45

## 2023-06-28 RX ADMIN — FAMOTIDINE 10 MG: 20 TABLET ORAL at 08:44

## 2023-06-28 RX ADMIN — GUAIFENESIN 600 MG: 600 TABLET, EXTENDED RELEASE ORAL at 21:45

## 2023-06-28 RX ADMIN — BUMETANIDE 1 MG: 1 TABLET ORAL at 21:45

## 2023-06-28 RX ADMIN — PANTOPRAZOLE SODIUM 40 MG: 40 TABLET, DELAYED RELEASE ORAL at 08:46

## 2023-06-28 RX ADMIN — Medication 10 UNITS: at 18:05

## 2023-06-28 RX ADMIN — Medication 3 UNITS: at 12:37

## 2023-06-28 RX ADMIN — CITALOPRAM HYDROBROMIDE 40 MG: 20 TABLET ORAL at 08:44

## 2023-06-28 RX ADMIN — SODIUM CHLORIDE, PRESERVATIVE FREE 10 ML: 5 INJECTION INTRAVENOUS at 08:48

## 2023-06-28 RX ADMIN — BUSPIRONE HYDROCHLORIDE 15 MG: 10 TABLET ORAL at 21:46

## 2023-06-28 RX ADMIN — ASPIRIN 81 MG: 81 TABLET, COATED ORAL at 08:44

## 2023-06-28 RX ADMIN — Medication 10 UNITS: at 12:36

## 2023-06-28 RX ADMIN — ENOXAPARIN SODIUM 40 MG: 100 INJECTION SUBCUTANEOUS at 08:46

## 2023-06-28 RX ADMIN — Medication 10 UNITS: at 08:47

## 2023-06-28 RX ADMIN — GUAIFENESIN 600 MG: 600 TABLET, EXTENDED RELEASE ORAL at 08:45

## 2023-06-28 RX ADMIN — MORPHINE SULFATE 45 MG: 15 TABLET, FILM COATED, EXTENDED RELEASE ORAL at 21:46

## 2023-06-28 RX ADMIN — ROSUVASTATIN CALCIUM 40 MG: 40 TABLET, COATED ORAL at 08:44

## 2023-06-28 RX ADMIN — Medication 15 UNITS: at 08:46

## 2023-06-28 ASSESSMENT — PAIN DESCRIPTION - LOCATION
LOCATION: PELVIS
LOCATION: ABDOMEN
LOCATION: PELVIS
LOCATION: ABDOMEN

## 2023-06-28 ASSESSMENT — PAIN DESCRIPTION - DESCRIPTORS
DESCRIPTORS: ACHING
DESCRIPTORS: ACHING

## 2023-06-28 ASSESSMENT — PAIN SCALES - GENERAL
PAINLEVEL_OUTOF10: 7
PAINLEVEL_OUTOF10: 3
PAINLEVEL_OUTOF10: 7
PAINLEVEL_OUTOF10: 7

## 2023-06-28 ASSESSMENT — PAIN DESCRIPTION - ORIENTATION: ORIENTATION: ANTERIOR;LOWER

## 2023-06-29 ENCOUNTER — TELEPHONE (OUTPATIENT)
Age: 53
End: 2023-06-29

## 2023-06-29 LAB
ALBUMIN SERPL-MCNC: 3.4 G/DL (ref 3.5–5)
ANION GAP SERPL CALC-SCNC: 8 MMOL/L (ref 5–15)
BUN SERPL-MCNC: 26 MG/DL (ref 6–20)
BUN/CREAT SERPL: 16 (ref 12–20)
CALCIUM SERPL-MCNC: 8.8 MG/DL (ref 8.5–10.1)
CHLORIDE SERPL-SCNC: 91 MMOL/L (ref 97–108)
CO2 SERPL-SCNC: 33 MMOL/L (ref 21–32)
CREAT SERPL-MCNC: 1.65 MG/DL (ref 0.55–1.02)
GLUCOSE BLD STRIP.AUTO-MCNC: 222 MG/DL (ref 65–117)
GLUCOSE BLD STRIP.AUTO-MCNC: 227 MG/DL (ref 65–117)
GLUCOSE BLD STRIP.AUTO-MCNC: 284 MG/DL (ref 65–117)
GLUCOSE BLD STRIP.AUTO-MCNC: 296 MG/DL (ref 65–117)
GLUCOSE BLD STRIP.AUTO-MCNC: 315 MG/DL (ref 65–117)
GLUCOSE BLD STRIP.AUTO-MCNC: 388 MG/DL (ref 65–117)
GLUCOSE BLD STRIP.AUTO-MCNC: 419 MG/DL (ref 65–117)
GLUCOSE SERPL-MCNC: 296 MG/DL (ref 65–100)
PHOSPHATE SERPL-MCNC: 4.2 MG/DL (ref 2.6–4.7)
POTASSIUM SERPL-SCNC: 4.7 MMOL/L (ref 3.5–5.1)
SERVICE CMNT-IMP: ABNORMAL
SODIUM SERPL-SCNC: 132 MMOL/L (ref 136–145)

## 2023-06-29 PROCEDURE — 82962 GLUCOSE BLOOD TEST: CPT

## 2023-06-29 PROCEDURE — 6370000000 HC RX 637 (ALT 250 FOR IP): Performed by: INTERNAL MEDICINE

## 2023-06-29 PROCEDURE — 6360000002 HC RX W HCPCS: Performed by: INTERNAL MEDICINE

## 2023-06-29 PROCEDURE — 2700000000 HC OXYGEN THERAPY PER DAY

## 2023-06-29 PROCEDURE — 94760 N-INVAS EAR/PLS OXIMETRY 1: CPT

## 2023-06-29 PROCEDURE — 97166 OT EVAL MOD COMPLEX 45 MIN: CPT | Performed by: OCCUPATIONAL THERAPIST

## 2023-06-29 PROCEDURE — 97116 GAIT TRAINING THERAPY: CPT

## 2023-06-29 PROCEDURE — 2060000000 HC ICU INTERMEDIATE R&B

## 2023-06-29 PROCEDURE — 97161 PT EVAL LOW COMPLEX 20 MIN: CPT

## 2023-06-29 PROCEDURE — 36415 COLL VENOUS BLD VENIPUNCTURE: CPT

## 2023-06-29 PROCEDURE — 2580000003 HC RX 258: Performed by: INTERNAL MEDICINE

## 2023-06-29 PROCEDURE — 80069 RENAL FUNCTION PANEL: CPT

## 2023-06-29 PROCEDURE — 6370000000 HC RX 637 (ALT 250 FOR IP)

## 2023-06-29 PROCEDURE — 97535 SELF CARE MNGMENT TRAINING: CPT | Performed by: OCCUPATIONAL THERAPIST

## 2023-06-29 RX ORDER — INSULIN LISPRO 100 [IU]/ML
6 INJECTION, SOLUTION INTRAVENOUS; SUBCUTANEOUS
Status: DISCONTINUED | OUTPATIENT
Start: 2023-06-29 | End: 2023-06-30 | Stop reason: HOSPADM

## 2023-06-29 RX ORDER — INSULIN GLARGINE 100 [IU]/ML
40 INJECTION, SOLUTION SUBCUTANEOUS NIGHTLY
Status: DISCONTINUED | OUTPATIENT
Start: 2023-06-29 | End: 2023-06-30 | Stop reason: HOSPADM

## 2023-06-29 RX ADMIN — ENOXAPARIN SODIUM 40 MG: 100 INJECTION SUBCUTANEOUS at 08:22

## 2023-06-29 RX ADMIN — CEFEPIME 2000 MG: 2 INJECTION, POWDER, FOR SOLUTION INTRAVENOUS at 15:22

## 2023-06-29 RX ADMIN — Medication 4 UNITS: at 12:06

## 2023-06-29 RX ADMIN — SODIUM CHLORIDE, PRESERVATIVE FREE 10 ML: 5 INJECTION INTRAVENOUS at 21:29

## 2023-06-29 RX ADMIN — BARICITINIB 2 MG: 2 TABLET, FILM COATED ORAL at 12:06

## 2023-06-29 RX ADMIN — GABAPENTIN 300 MG: 300 CAPSULE ORAL at 21:26

## 2023-06-29 RX ADMIN — INSULIN GLARGINE 40 UNITS: 100 INJECTION, SOLUTION SUBCUTANEOUS at 21:27

## 2023-06-29 RX ADMIN — Medication 3 UNITS: at 08:22

## 2023-06-29 RX ADMIN — CITALOPRAM HYDROBROMIDE 40 MG: 20 TABLET ORAL at 08:21

## 2023-06-29 RX ADMIN — GUAIFENESIN 600 MG: 600 TABLET, EXTENDED RELEASE ORAL at 21:27

## 2023-06-29 RX ADMIN — BUSPIRONE HYDROCHLORIDE 15 MG: 10 TABLET ORAL at 08:21

## 2023-06-29 RX ADMIN — BREXPIPRAZOLE 1 MG: 1 TABLET ORAL at 08:20

## 2023-06-29 RX ADMIN — ALPRAZOLAM 2 MG: 0.5 TABLET ORAL at 21:25

## 2023-06-29 RX ADMIN — BUSPIRONE HYDROCHLORIDE 15 MG: 10 TABLET ORAL at 13:59

## 2023-06-29 RX ADMIN — BUTALBITAL, ACETAMINOPHEN, AND CAFFEINE 1 TABLET: 50; 325; 40 TABLET ORAL at 12:42

## 2023-06-29 RX ADMIN — Medication 4 UNITS: at 17:23

## 2023-06-29 RX ADMIN — MORPHINE SULFATE 45 MG: 15 TABLET, FILM COATED, EXTENDED RELEASE ORAL at 08:21

## 2023-06-29 RX ADMIN — ROSUVASTATIN CALCIUM 40 MG: 40 TABLET, COATED ORAL at 08:21

## 2023-06-29 RX ADMIN — GABAPENTIN 300 MG: 300 CAPSULE ORAL at 08:21

## 2023-06-29 RX ADMIN — Medication 10 UNITS: at 08:22

## 2023-06-29 RX ADMIN — ASPIRIN 81 MG: 81 TABLET, COATED ORAL at 08:21

## 2023-06-29 RX ADMIN — BUSPIRONE HYDROCHLORIDE 15 MG: 10 TABLET ORAL at 21:25

## 2023-06-29 RX ADMIN — MORPHINE SULFATE 45 MG: 15 TABLET, FILM COATED, EXTENDED RELEASE ORAL at 21:25

## 2023-06-29 RX ADMIN — GUAIFENESIN 600 MG: 600 TABLET, EXTENDED RELEASE ORAL at 08:23

## 2023-06-29 RX ADMIN — BUMETANIDE 1 MG: 1 TABLET ORAL at 21:27

## 2023-06-29 RX ADMIN — ARIPIPRAZOLE 30 MG: 5 TABLET ORAL at 08:20

## 2023-06-29 RX ADMIN — PANTOPRAZOLE SODIUM 40 MG: 40 TABLET, DELAYED RELEASE ORAL at 08:20

## 2023-06-29 RX ADMIN — Medication 6 UNITS: at 17:23

## 2023-06-29 RX ADMIN — SODIUM CHLORIDE, PRESERVATIVE FREE 10 ML: 5 INJECTION INTRAVENOUS at 08:23

## 2023-06-29 RX ADMIN — BUMETANIDE 1 MG: 1 TABLET ORAL at 08:21

## 2023-06-29 RX ADMIN — ALPRAZOLAM 2 MG: 0.5 TABLET ORAL at 00:22

## 2023-06-29 RX ADMIN — FAMOTIDINE 10 MG: 20 TABLET ORAL at 08:21

## 2023-06-29 RX ADMIN — GABAPENTIN 300 MG: 300 CAPSULE ORAL at 13:59

## 2023-06-29 RX ADMIN — OXYCODONE HYDROCHLORIDE 5 MG: 5 TABLET ORAL at 15:56

## 2023-06-29 ASSESSMENT — PAIN DESCRIPTION - LOCATION
LOCATION: HEAD
LOCATION: ABDOMEN
LOCATION: HEAD

## 2023-06-29 ASSESSMENT — PAIN SCALES - GENERAL
PAINLEVEL_OUTOF10: 0
PAINLEVEL_OUTOF10: 9
PAINLEVEL_OUTOF10: 8
PAINLEVEL_OUTOF10: 0
PAINLEVEL_OUTOF10: 9

## 2023-06-30 ENCOUNTER — TELEPHONE (OUTPATIENT)
Age: 53
End: 2023-06-30

## 2023-06-30 VITALS
HEIGHT: 65 IN | DIASTOLIC BLOOD PRESSURE: 72 MMHG | WEIGHT: 199.96 LBS | OXYGEN SATURATION: 94 % | RESPIRATION RATE: 18 BRPM | HEART RATE: 82 BPM | TEMPERATURE: 98.1 F | BODY MASS INDEX: 33.31 KG/M2 | SYSTOLIC BLOOD PRESSURE: 108 MMHG

## 2023-06-30 DIAGNOSIS — E10.65 TYPE 1 DIABETES MELLITUS WITH HYPERGLYCEMIA (HCC): ICD-10-CM

## 2023-06-30 LAB
ALBUMIN SERPL-MCNC: 3.1 G/DL (ref 3.5–5)
ANION GAP SERPL CALC-SCNC: 3 MMOL/L (ref 5–15)
BACTERIA SPEC CULT: NORMAL
BACTERIA SPEC CULT: NORMAL
BUN SERPL-MCNC: 29 MG/DL (ref 6–20)
BUN/CREAT SERPL: 17 (ref 12–20)
CALCIUM SERPL-MCNC: 8.8 MG/DL (ref 8.5–10.1)
CHLORIDE SERPL-SCNC: 92 MMOL/L (ref 97–108)
CO2 SERPL-SCNC: 40 MMOL/L (ref 21–32)
CREAT SERPL-MCNC: 1.71 MG/DL (ref 0.55–1.02)
GLUCOSE BLD STRIP.AUTO-MCNC: 143 MG/DL (ref 65–117)
GLUCOSE BLD STRIP.AUTO-MCNC: 226 MG/DL (ref 65–117)
GLUCOSE SERPL-MCNC: 160 MG/DL (ref 65–100)
PHOSPHATE SERPL-MCNC: 4.3 MG/DL (ref 2.6–4.7)
POTASSIUM SERPL-SCNC: 3.5 MMOL/L (ref 3.5–5.1)
SERVICE CMNT-IMP: ABNORMAL
SERVICE CMNT-IMP: ABNORMAL
SERVICE CMNT-IMP: NORMAL
SERVICE CMNT-IMP: NORMAL
SODIUM SERPL-SCNC: 135 MMOL/L (ref 136–145)

## 2023-06-30 PROCEDURE — 82962 GLUCOSE BLOOD TEST: CPT

## 2023-06-30 PROCEDURE — 6370000000 HC RX 637 (ALT 250 FOR IP)

## 2023-06-30 PROCEDURE — 6370000000 HC RX 637 (ALT 250 FOR IP): Performed by: INTERNAL MEDICINE

## 2023-06-30 PROCEDURE — 94760 N-INVAS EAR/PLS OXIMETRY 1: CPT

## 2023-06-30 PROCEDURE — 2580000003 HC RX 258: Performed by: INTERNAL MEDICINE

## 2023-06-30 PROCEDURE — 80069 RENAL FUNCTION PANEL: CPT

## 2023-06-30 PROCEDURE — 36415 COLL VENOUS BLD VENIPUNCTURE: CPT

## 2023-06-30 PROCEDURE — 6360000002 HC RX W HCPCS: Performed by: INTERNAL MEDICINE

## 2023-06-30 PROCEDURE — 2700000000 HC OXYGEN THERAPY PER DAY

## 2023-06-30 RX ORDER — NICOTINE 21 MG/24HR
1 PATCH, TRANSDERMAL 24 HOURS TRANSDERMAL DAILY
Qty: 30 PATCH | Refills: 3 | Status: SHIPPED | OUTPATIENT
Start: 2023-07-01

## 2023-06-30 RX ORDER — POTASSIUM CHLORIDE 750 MG/1
20 TABLET, FILM COATED, EXTENDED RELEASE ORAL ONCE
Status: COMPLETED | OUTPATIENT
Start: 2023-06-30 | End: 2023-06-30

## 2023-06-30 RX ORDER — PEN NEEDLE, DIABETIC 31 GX5/16"
1 NEEDLE, DISPOSABLE MISCELLANEOUS 3 TIMES DAILY
Qty: 100 EACH | Refills: 1 | Status: SHIPPED | OUTPATIENT
Start: 2023-06-30 | End: 2023-09-28

## 2023-06-30 RX ORDER — INSULIN LISPRO 100 [IU]/ML
INJECTION, SOLUTION INTRAVENOUS; SUBCUTANEOUS
COMMUNITY
Start: 2023-06-30

## 2023-06-30 RX ORDER — INSULIN LISPRO 100 [IU]/ML
6 INJECTION, SOLUTION INTRAVENOUS; SUBCUTANEOUS 3 TIMES DAILY
Qty: 15 ML | Refills: 0 | Status: SHIPPED
Start: 2023-06-30 | End: 2023-06-30 | Stop reason: ALTCHOICE

## 2023-06-30 RX ORDER — BUMETANIDE 2 MG/1
1 TABLET ORAL 2 TIMES DAILY
Qty: 30 TABLET | Refills: 3 | Status: SHIPPED | OUTPATIENT
Start: 2023-06-30 | End: 2023-10-28

## 2023-06-30 RX ADMIN — GUAIFENESIN 600 MG: 600 TABLET, EXTENDED RELEASE ORAL at 08:55

## 2023-06-30 RX ADMIN — SODIUM CHLORIDE, PRESERVATIVE FREE 10 ML: 5 INJECTION INTRAVENOUS at 08:59

## 2023-06-30 RX ADMIN — Medication 6 UNITS: at 08:57

## 2023-06-30 RX ADMIN — ASPIRIN 81 MG: 81 TABLET, COATED ORAL at 08:56

## 2023-06-30 RX ADMIN — ALPRAZOLAM 2 MG: 0.5 TABLET ORAL at 12:31

## 2023-06-30 RX ADMIN — Medication 6 UNITS: at 12:32

## 2023-06-30 RX ADMIN — BARICITINIB 2 MG: 2 TABLET, FILM COATED ORAL at 12:17

## 2023-06-30 RX ADMIN — ROSUVASTATIN CALCIUM 40 MG: 40 TABLET, COATED ORAL at 08:55

## 2023-06-30 RX ADMIN — BUSPIRONE HYDROCHLORIDE 15 MG: 10 TABLET ORAL at 08:56

## 2023-06-30 RX ADMIN — ENOXAPARIN SODIUM 40 MG: 100 INJECTION SUBCUTANEOUS at 08:57

## 2023-06-30 RX ADMIN — BUSPIRONE HYDROCHLORIDE 15 MG: 10 TABLET ORAL at 14:25

## 2023-06-30 RX ADMIN — FAMOTIDINE 10 MG: 20 TABLET ORAL at 08:54

## 2023-06-30 RX ADMIN — PANTOPRAZOLE SODIUM 40 MG: 40 TABLET, DELAYED RELEASE ORAL at 08:56

## 2023-06-30 RX ADMIN — Medication 1 UNITS: at 12:33

## 2023-06-30 RX ADMIN — GABAPENTIN 300 MG: 300 CAPSULE ORAL at 08:55

## 2023-06-30 RX ADMIN — CITALOPRAM HYDROBROMIDE 40 MG: 20 TABLET ORAL at 08:55

## 2023-06-30 RX ADMIN — OXYCODONE HYDROCHLORIDE 5 MG: 5 TABLET ORAL at 12:30

## 2023-06-30 RX ADMIN — MORPHINE SULFATE 45 MG: 15 TABLET, FILM COATED, EXTENDED RELEASE ORAL at 08:58

## 2023-06-30 RX ADMIN — BUMETANIDE 1 MG: 1 TABLET ORAL at 08:55

## 2023-06-30 RX ADMIN — ARIPIPRAZOLE 30 MG: 5 TABLET ORAL at 08:57

## 2023-06-30 RX ADMIN — GABAPENTIN 300 MG: 300 CAPSULE ORAL at 14:25

## 2023-06-30 RX ADMIN — BREXPIPRAZOLE 1 MG: 1 TABLET ORAL at 08:57

## 2023-06-30 RX ADMIN — POTASSIUM CHLORIDE 20 MEQ: 750 TABLET, FILM COATED, EXTENDED RELEASE ORAL at 09:15

## 2023-06-30 ASSESSMENT — PAIN SCALES - GENERAL
PAINLEVEL_OUTOF10: 9
PAINLEVEL_OUTOF10: 0
PAINLEVEL_OUTOF10: 9

## 2023-06-30 ASSESSMENT — PAIN DESCRIPTION - DESCRIPTORS
DESCRIPTORS: ACHING
DESCRIPTORS: ACHING

## 2023-06-30 ASSESSMENT — PAIN DESCRIPTION - ORIENTATION
ORIENTATION: ANTERIOR
ORIENTATION: ANTERIOR

## 2023-06-30 ASSESSMENT — PAIN DESCRIPTION - LOCATION
LOCATION: ABDOMEN
LOCATION: ABDOMEN

## 2023-07-01 ENCOUNTER — TELEPHONE (OUTPATIENT)
Age: 53
End: 2023-07-01

## 2023-07-01 RX ORDER — IBUPROFEN 600 MG/1
TABLET ORAL
Qty: 2 KIT | Refills: 11 | OUTPATIENT
Start: 2023-07-01

## 2023-07-01 RX ORDER — INSULIN GLARGINE 100 [IU]/ML
INJECTION, SOLUTION SUBCUTANEOUS
COMMUNITY
Start: 2023-07-01

## 2023-07-01 RX ORDER — GLUCAGON INJECTION, SOLUTION 1 MG/.2ML
INJECTION, SOLUTION SUBCUTANEOUS
Qty: 1 EACH | Refills: 11 | Status: SHIPPED | OUTPATIENT
Start: 2023-07-01

## 2023-07-11 ENCOUNTER — APPOINTMENT (OUTPATIENT)
Facility: HOSPITAL | Age: 53
End: 2023-07-11
Payer: MEDICARE

## 2023-07-11 ENCOUNTER — HOSPITAL ENCOUNTER (INPATIENT)
Facility: HOSPITAL | Age: 53
LOS: 3 days | Discharge: HOME | End: 2023-07-14
Attending: STUDENT IN AN ORGANIZED HEALTH CARE EDUCATION/TRAINING PROGRAM | Admitting: INTERNAL MEDICINE
Payer: MEDICARE

## 2023-07-11 DIAGNOSIS — E87.1 HYPONATREMIA: Primary | ICD-10-CM

## 2023-07-11 DIAGNOSIS — N30.00 ACUTE CYSTITIS WITHOUT HEMATURIA: ICD-10-CM

## 2023-07-11 DIAGNOSIS — E16.2 HYPOGLYCEMIA: ICD-10-CM

## 2023-07-11 LAB
ALBUMIN SERPL-MCNC: 3.3 G/DL (ref 3.5–5)
ALBUMIN/GLOB SERPL: 0.9 (ref 1.1–2.2)
ALP SERPL-CCNC: 84 U/L (ref 45–117)
ALT SERPL-CCNC: 22 U/L (ref 12–78)
ANION GAP SERPL CALC-SCNC: 7 MMOL/L (ref 5–15)
APPEARANCE UR: CLEAR
AST SERPL-CCNC: 26 U/L (ref 15–37)
BACTERIA URNS QL MICRO: NEGATIVE /HPF
BASOPHILS # BLD: 0 K/UL (ref 0–0.1)
BASOPHILS NFR BLD: 0 % (ref 0–1)
BILIRUB SERPL-MCNC: 0.6 MG/DL (ref 0.2–1)
BILIRUB UR QL: NEGATIVE
BUN SERPL-MCNC: 19 MG/DL (ref 6–20)
BUN/CREAT SERPL: 15 (ref 12–20)
CALCIUM SERPL-MCNC: 9 MG/DL (ref 8.5–10.1)
CHLORIDE SERPL-SCNC: 82 MMOL/L (ref 97–108)
CO2 SERPL-SCNC: 34 MMOL/L (ref 21–32)
COLOR UR: ABNORMAL
COMMENT:: NORMAL
CREAT SERPL-MCNC: 1.26 MG/DL (ref 0.55–1.02)
DIFFERENTIAL METHOD BLD: ABNORMAL
EKG ATRIAL RATE: 75 BPM
EKG DIAGNOSIS: NORMAL
EKG P AXIS: 58 DEGREES
EKG P-R INTERVAL: 206 MS
EKG Q-T INTERVAL: 368 MS
EKG QRS DURATION: 82 MS
EKG QTC CALCULATION (BAZETT): 410 MS
EKG R AXIS: -6 DEGREES
EKG T AXIS: 53 DEGREES
EKG VENTRICULAR RATE: 75 BPM
EOSINOPHIL # BLD: 0.2 K/UL (ref 0–0.4)
EOSINOPHIL NFR BLD: 2 % (ref 0–7)
EPITH CASTS URNS QL MICRO: ABNORMAL /LPF
ERYTHROCYTE [DISTWIDTH] IN BLOOD BY AUTOMATED COUNT: 26 % (ref 11.5–14.5)
EST. AVERAGE GLUCOSE BLD GHB EST-MCNC: 180 MG/DL
GLOBULIN SER CALC-MCNC: 3.5 G/DL (ref 2–4)
GLUCOSE BLD STRIP.AUTO-MCNC: 104 MG/DL (ref 65–117)
GLUCOSE BLD STRIP.AUTO-MCNC: 122 MG/DL (ref 65–117)
GLUCOSE BLD STRIP.AUTO-MCNC: 154 MG/DL (ref 65–117)
GLUCOSE BLD STRIP.AUTO-MCNC: 155 MG/DL (ref 65–117)
GLUCOSE BLD STRIP.AUTO-MCNC: 217 MG/DL (ref 65–117)
GLUCOSE BLD STRIP.AUTO-MCNC: 69 MG/DL (ref 65–117)
GLUCOSE BLD STRIP.AUTO-MCNC: 84 MG/DL (ref 65–117)
GLUCOSE BLD STRIP.AUTO-MCNC: 85 MG/DL (ref 65–117)
GLUCOSE SERPL-MCNC: 63 MG/DL (ref 65–100)
GLUCOSE UR STRIP.AUTO-MCNC: NEGATIVE MG/DL
HBA1C MFR BLD: 7.9 % (ref 4–5.6)
HCT VFR BLD AUTO: 29.2 % (ref 35–47)
HGB BLD-MCNC: 8.7 G/DL (ref 11.5–16)
HGB UR QL STRIP: NEGATIVE
HYALINE CASTS URNS QL MICRO: ABNORMAL /LPF (ref 0–2)
IMM GRANULOCYTES # BLD AUTO: 0 K/UL (ref 0–0.04)
IMM GRANULOCYTES NFR BLD AUTO: 0 % (ref 0–0.5)
KETONES UR QL STRIP.AUTO: NEGATIVE MG/DL
LEUKOCYTE ESTERASE UR QL STRIP.AUTO: ABNORMAL
LYMPHOCYTES # BLD: 2.5 K/UL (ref 0.8–3.5)
LYMPHOCYTES NFR BLD: 28 % (ref 12–49)
MAGNESIUM SERPL-MCNC: 2.3 MG/DL (ref 1.6–2.4)
MCH RBC QN AUTO: 21.2 PG (ref 26–34)
MCHC RBC AUTO-ENTMCNC: 29.8 G/DL (ref 30–36.5)
MCV RBC AUTO: 71 FL (ref 80–99)
MONOCYTES # BLD: 1.4 K/UL (ref 0–1)
MONOCYTES NFR BLD: 15 % (ref 5–13)
NEUTS SEG # BLD: 4.9 K/UL (ref 1.8–8)
NEUTS SEG NFR BLD: 55 % (ref 32–75)
NITRITE UR QL STRIP.AUTO: NEGATIVE
NRBC # BLD: 0 K/UL (ref 0–0.01)
NRBC BLD-RTO: 0 PER 100 WBC
NT PRO BNP: 115 PG/ML
OSMOLALITY SERPL: 263 MOSM/KG H2O
OSMOLALITY UR: 202 MOSM/KG H2O
PH UR STRIP: 7.5 (ref 5–8)
PHOSPHATE SERPL-MCNC: 4.6 MG/DL (ref 2.6–4.7)
PLATELET # BLD AUTO: 452 K/UL (ref 150–400)
PMV BLD AUTO: 8.7 FL (ref 8.9–12.9)
POTASSIUM SERPL-SCNC: 4.3 MMOL/L (ref 3.5–5.1)
PROT SERPL-MCNC: 6.8 G/DL (ref 6.4–8.2)
PROT UR STRIP-MCNC: NEGATIVE MG/DL
RBC # BLD AUTO: 4.11 M/UL (ref 3.8–5.2)
RBC #/AREA URNS HPF: ABNORMAL /HPF (ref 0–5)
RBC MORPH BLD: ABNORMAL
RBC MORPH BLD: ABNORMAL
SERVICE CMNT-IMP: ABNORMAL
SERVICE CMNT-IMP: NORMAL
SODIUM SERPL-SCNC: 122 MMOL/L (ref 136–145)
SODIUM SERPL-SCNC: 123 MMOL/L (ref 136–145)
SODIUM SERPL-SCNC: 124 MMOL/L (ref 136–145)
SODIUM UR-SCNC: 8 MMOL/L
SP GR UR REFRACTOMETRY: 1.01
SPECIMEN HOLD: NORMAL
URATE SERPL-MCNC: 4 MG/DL (ref 2.6–6)
URINE CULTURE IF INDICATED: ABNORMAL
UROBILINOGEN UR QL STRIP.AUTO: 1 EU/DL (ref 0.2–1)
WBC # BLD AUTO: 9 K/UL (ref 3.6–11)
WBC URNS QL MICRO: ABNORMAL /HPF (ref 0–4)

## 2023-07-11 PROCEDURE — 6360000002 HC RX W HCPCS

## 2023-07-11 PROCEDURE — 83880 ASSAY OF NATRIURETIC PEPTIDE: CPT

## 2023-07-11 PROCEDURE — 87086 URINE CULTURE/COLONY COUNT: CPT

## 2023-07-11 PROCEDURE — 6370000000 HC RX 637 (ALT 250 FOR IP): Performed by: STUDENT IN AN ORGANIZED HEALTH CARE EDUCATION/TRAINING PROGRAM

## 2023-07-11 PROCEDURE — 6370000000 HC RX 637 (ALT 250 FOR IP)

## 2023-07-11 PROCEDURE — 71046 X-RAY EXAM CHEST 2 VIEWS: CPT

## 2023-07-11 PROCEDURE — 1100000003 HC PRIVATE W/ TELEMETRY

## 2023-07-11 PROCEDURE — 99285 EMERGENCY DEPT VISIT HI MDM: CPT

## 2023-07-11 PROCEDURE — 96365 THER/PROPH/DIAG IV INF INIT: CPT

## 2023-07-11 PROCEDURE — 83036 HEMOGLOBIN GLYCOSYLATED A1C: CPT

## 2023-07-11 PROCEDURE — 2580000003 HC RX 258

## 2023-07-11 PROCEDURE — 2500000003 HC RX 250 WO HCPCS: Performed by: INTERNAL MEDICINE

## 2023-07-11 PROCEDURE — 84300 ASSAY OF URINE SODIUM: CPT

## 2023-07-11 PROCEDURE — 83735 ASSAY OF MAGNESIUM: CPT

## 2023-07-11 PROCEDURE — 87186 SC STD MICRODIL/AGAR DIL: CPT

## 2023-07-11 PROCEDURE — 81001 URINALYSIS AUTO W/SCOPE: CPT

## 2023-07-11 PROCEDURE — 83935 ASSAY OF URINE OSMOLALITY: CPT

## 2023-07-11 PROCEDURE — 84100 ASSAY OF PHOSPHORUS: CPT

## 2023-07-11 PROCEDURE — 2580000003 HC RX 258: Performed by: STUDENT IN AN ORGANIZED HEALTH CARE EDUCATION/TRAINING PROGRAM

## 2023-07-11 PROCEDURE — 83930 ASSAY OF BLOOD OSMOLALITY: CPT

## 2023-07-11 PROCEDURE — 85025 COMPLETE CBC W/AUTO DIFF WBC: CPT

## 2023-07-11 PROCEDURE — 87040 BLOOD CULTURE FOR BACTERIA: CPT

## 2023-07-11 PROCEDURE — 82962 GLUCOSE BLOOD TEST: CPT

## 2023-07-11 PROCEDURE — 84295 ASSAY OF SERUM SODIUM: CPT

## 2023-07-11 PROCEDURE — 84550 ASSAY OF BLOOD/URIC ACID: CPT

## 2023-07-11 PROCEDURE — 87077 CULTURE AEROBIC IDENTIFY: CPT

## 2023-07-11 PROCEDURE — 6370000000 HC RX 637 (ALT 250 FOR IP): Performed by: INTERNAL MEDICINE

## 2023-07-11 PROCEDURE — 6360000002 HC RX W HCPCS: Performed by: STUDENT IN AN ORGANIZED HEALTH CARE EDUCATION/TRAINING PROGRAM

## 2023-07-11 PROCEDURE — 80053 COMPREHEN METABOLIC PANEL: CPT

## 2023-07-11 PROCEDURE — 93005 ELECTROCARDIOGRAM TRACING: CPT | Performed by: STUDENT IN AN ORGANIZED HEALTH CARE EDUCATION/TRAINING PROGRAM

## 2023-07-11 PROCEDURE — 36415 COLL VENOUS BLD VENIPUNCTURE: CPT

## 2023-07-11 RX ORDER — SODIUM CHLORIDE 0.9 % (FLUSH) 0.9 %
5-40 SYRINGE (ML) INJECTION EVERY 12 HOURS SCHEDULED
Status: DISCONTINUED | OUTPATIENT
Start: 2023-07-11 | End: 2023-07-14 | Stop reason: HOSPADM

## 2023-07-11 RX ORDER — GABAPENTIN 300 MG/1
300 CAPSULE ORAL 3 TIMES DAILY
Status: DISCONTINUED | OUTPATIENT
Start: 2023-07-11 | End: 2023-07-14 | Stop reason: HOSPADM

## 2023-07-11 RX ORDER — INSULIN LISPRO 100 [IU]/ML
0-4 INJECTION, SOLUTION INTRAVENOUS; SUBCUTANEOUS NIGHTLY
Status: DISCONTINUED | OUTPATIENT
Start: 2023-07-11 | End: 2023-07-14 | Stop reason: HOSPADM

## 2023-07-11 RX ORDER — CITALOPRAM 20 MG/1
40 TABLET ORAL DAILY
Status: DISCONTINUED | OUTPATIENT
Start: 2023-07-11 | End: 2023-07-14 | Stop reason: HOSPADM

## 2023-07-11 RX ORDER — FAMOTIDINE 20 MG/1
40 TABLET, FILM COATED ORAL DAILY
Status: DISCONTINUED | OUTPATIENT
Start: 2023-07-11 | End: 2023-07-14 | Stop reason: HOSPADM

## 2023-07-11 RX ORDER — POLYETHYLENE GLYCOL 3350 17 G/17G
17 POWDER, FOR SOLUTION ORAL DAILY PRN
Status: DISCONTINUED | OUTPATIENT
Start: 2023-07-11 | End: 2023-07-14 | Stop reason: HOSPADM

## 2023-07-11 RX ORDER — PANTOPRAZOLE SODIUM 40 MG/1
40 TABLET, DELAYED RELEASE ORAL
Status: DISCONTINUED | OUTPATIENT
Start: 2023-07-12 | End: 2023-07-11

## 2023-07-11 RX ORDER — MORPHINE SULFATE 60 MG/1
60 TABLET, FILM COATED, EXTENDED RELEASE ORAL 2 TIMES DAILY
Status: DISCONTINUED | OUTPATIENT
Start: 2023-07-11 | End: 2023-07-12

## 2023-07-11 RX ORDER — ACETAMINOPHEN 650 MG/1
650 SUPPOSITORY RECTAL EVERY 6 HOURS PRN
Status: DISCONTINUED | OUTPATIENT
Start: 2023-07-11 | End: 2023-07-14 | Stop reason: HOSPADM

## 2023-07-11 RX ORDER — ASPIRIN 81 MG/1
81 TABLET ORAL DAILY
Status: DISCONTINUED | OUTPATIENT
Start: 2023-07-11 | End: 2023-07-14 | Stop reason: HOSPADM

## 2023-07-11 RX ORDER — INSULIN LISPRO 100 [IU]/ML
0-4 INJECTION, SOLUTION INTRAVENOUS; SUBCUTANEOUS
Status: DISCONTINUED | OUTPATIENT
Start: 2023-07-11 | End: 2023-07-13

## 2023-07-11 RX ORDER — ENOXAPARIN SODIUM 100 MG/ML
40 INJECTION SUBCUTANEOUS DAILY
Status: DISCONTINUED | OUTPATIENT
Start: 2023-07-11 | End: 2023-07-14 | Stop reason: HOSPADM

## 2023-07-11 RX ORDER — OXYCODONE HYDROCHLORIDE 5 MG/1
5 TABLET ORAL ONCE
Status: COMPLETED | OUTPATIENT
Start: 2023-07-11 | End: 2023-07-11

## 2023-07-11 RX ORDER — ACETAMINOPHEN 500 MG
1000 TABLET ORAL
Status: COMPLETED | OUTPATIENT
Start: 2023-07-11 | End: 2023-07-11

## 2023-07-11 RX ORDER — ROSUVASTATIN CALCIUM 40 MG/1
40 TABLET, COATED ORAL DAILY
Status: DISCONTINUED | OUTPATIENT
Start: 2023-07-11 | End: 2023-07-14 | Stop reason: HOSPADM

## 2023-07-11 RX ORDER — OXYCODONE HYDROCHLORIDE 5 MG/1
5 TABLET ORAL EVERY 6 HOURS PRN
Status: DISCONTINUED | OUTPATIENT
Start: 2023-07-11 | End: 2023-07-14 | Stop reason: HOSPADM

## 2023-07-11 RX ORDER — ALBUTEROL SULFATE 2.5 MG/3ML
2.5 SOLUTION RESPIRATORY (INHALATION) EVERY 6 HOURS PRN
Status: DISCONTINUED | OUTPATIENT
Start: 2023-07-11 | End: 2023-07-14 | Stop reason: HOSPADM

## 2023-07-11 RX ORDER — OXYCODONE HYDROCHLORIDE 5 MG/1
5 TABLET ORAL EVERY 6 HOURS PRN
Status: DISCONTINUED | OUTPATIENT
Start: 2023-07-11 | End: 2023-07-11

## 2023-07-11 RX ORDER — ARIPIPRAZOLE 5 MG/1
30 TABLET ORAL DAILY
Status: DISCONTINUED | OUTPATIENT
Start: 2023-07-11 | End: 2023-07-14 | Stop reason: HOSPADM

## 2023-07-11 RX ORDER — SODIUM CHLORIDE 9 MG/ML
INJECTION, SOLUTION INTRAVENOUS PRN
Status: DISCONTINUED | OUTPATIENT
Start: 2023-07-11 | End: 2023-07-14 | Stop reason: HOSPADM

## 2023-07-11 RX ORDER — NALOXONE HYDROCHLORIDE 0.4 MG/ML
0.4 INJECTION, SOLUTION INTRAMUSCULAR; INTRAVENOUS; SUBCUTANEOUS PRN
Status: DISCONTINUED | OUTPATIENT
Start: 2023-07-11 | End: 2023-07-14 | Stop reason: HOSPADM

## 2023-07-11 RX ORDER — BUTALBITAL, ACETAMINOPHEN AND CAFFEINE 50; 325; 40 MG/1; MG/1; MG/1
1 TABLET ORAL EVERY 6 HOURS PRN
Status: DISCONTINUED | OUTPATIENT
Start: 2023-07-11 | End: 2023-07-14 | Stop reason: HOSPADM

## 2023-07-11 RX ORDER — ONDANSETRON 2 MG/ML
4 INJECTION INTRAMUSCULAR; INTRAVENOUS EVERY 4 HOURS PRN
Status: DISCONTINUED | OUTPATIENT
Start: 2023-07-11 | End: 2023-07-14 | Stop reason: HOSPADM

## 2023-07-11 RX ORDER — NALOXONE HYDROCHLORIDE 4 MG/.1ML
1 SPRAY NASAL PRN
Status: DISCONTINUED | OUTPATIENT
Start: 2023-07-11 | End: 2023-07-11

## 2023-07-11 RX ORDER — ACETAMINOPHEN 325 MG/1
650 TABLET ORAL EVERY 6 HOURS PRN
Status: DISCONTINUED | OUTPATIENT
Start: 2023-07-11 | End: 2023-07-14 | Stop reason: HOSPADM

## 2023-07-11 RX ORDER — ALPRAZOLAM 0.5 MG/1
2 TABLET ORAL 3 TIMES DAILY PRN
Status: DISCONTINUED | OUTPATIENT
Start: 2023-07-11 | End: 2023-07-14 | Stop reason: HOSPADM

## 2023-07-11 RX ORDER — SODIUM CHLORIDE 0.9 % (FLUSH) 0.9 %
5-40 SYRINGE (ML) INJECTION PRN
Status: DISCONTINUED | OUTPATIENT
Start: 2023-07-11 | End: 2023-07-14 | Stop reason: HOSPADM

## 2023-07-11 RX ORDER — FLUTICASONE PROPIONATE 50 MCG
2 SPRAY, SUSPENSION (ML) NASAL DAILY PRN
Status: DISCONTINUED | OUTPATIENT
Start: 2023-07-11 | End: 2023-07-14 | Stop reason: HOSPADM

## 2023-07-11 RX ORDER — BUMETANIDE 0.25 MG/ML
1 INJECTION INTRAMUSCULAR; INTRAVENOUS 2 TIMES DAILY
Status: DISCONTINUED | OUTPATIENT
Start: 2023-07-11 | End: 2023-07-14

## 2023-07-11 RX ORDER — DEXTROSE MONOHYDRATE 100 MG/ML
INJECTION, SOLUTION INTRAVENOUS CONTINUOUS PRN
Status: DISCONTINUED | OUTPATIENT
Start: 2023-07-11 | End: 2023-07-14 | Stop reason: HOSPADM

## 2023-07-11 RX ORDER — CASTOR OIL AND BALSAM, PERU 788; 87 MG/G; MG/G
OINTMENT TOPICAL 2 TIMES DAILY
Status: DISCONTINUED | OUTPATIENT
Start: 2023-07-11 | End: 2023-07-14 | Stop reason: HOSPADM

## 2023-07-11 RX ORDER — NICOTINE 21 MG/24HR
1 PATCH, TRANSDERMAL 24 HOURS TRANSDERMAL DAILY
Status: DISCONTINUED | OUTPATIENT
Start: 2023-07-11 | End: 2023-07-14 | Stop reason: HOSPADM

## 2023-07-11 RX ADMIN — ASPIRIN 81 MG: 81 TABLET, COATED ORAL at 18:11

## 2023-07-11 RX ADMIN — OXYCODONE HYDROCHLORIDE 5 MG: 5 TABLET ORAL at 14:06

## 2023-07-11 RX ADMIN — BUSPIRONE HYDROCHLORIDE 15 MG: 10 TABLET ORAL at 22:57

## 2023-07-11 RX ADMIN — ARIPIPRAZOLE 30 MG: 5 TABLET ORAL at 18:11

## 2023-07-11 RX ADMIN — FAMOTIDINE 40 MG: 20 TABLET, FILM COATED ORAL at 18:11

## 2023-07-11 RX ADMIN — BUMETANIDE 1 MG: 0.25 INJECTION INTRAMUSCULAR; INTRAVENOUS at 21:56

## 2023-07-11 RX ADMIN — ROSUVASTATIN CALCIUM 40 MG: 40 TABLET, FILM COATED ORAL at 18:11

## 2023-07-11 RX ADMIN — ACETAMINOPHEN 1000 MG: 500 TABLET ORAL at 06:15

## 2023-07-11 RX ADMIN — CITALOPRAM HYDROBROMIDE 40 MG: 20 TABLET ORAL at 18:11

## 2023-07-11 RX ADMIN — Medication: at 17:26

## 2023-07-11 RX ADMIN — ALPRAZOLAM 2 MG: 0.5 TABLET ORAL at 18:20

## 2023-07-11 RX ADMIN — GABAPENTIN 300 MG: 300 CAPSULE ORAL at 22:56

## 2023-07-11 RX ADMIN — SODIUM CHLORIDE, PRESERVATIVE FREE 10 ML: 5 INJECTION INTRAVENOUS at 10:01

## 2023-07-11 RX ADMIN — OXYCODONE HYDROCHLORIDE 5 MG: 5 TABLET ORAL at 08:18

## 2023-07-11 RX ADMIN — SODIUM CHLORIDE, PRESERVATIVE FREE 10 ML: 5 INJECTION INTRAVENOUS at 21:56

## 2023-07-11 RX ADMIN — SODIUM CHLORIDE 1000 MG: 900 INJECTION INTRAVENOUS at 07:28

## 2023-07-11 RX ADMIN — BUMETANIDE 1 MG: 0.25 INJECTION INTRAMUSCULAR; INTRAVENOUS at 14:06

## 2023-07-11 RX ADMIN — ENOXAPARIN SODIUM 40 MG: 100 INJECTION SUBCUTANEOUS at 10:01

## 2023-07-11 ASSESSMENT — PAIN SCALES - GENERAL
PAINLEVEL_OUTOF10: 0
PAINLEVEL_OUTOF10: 9

## 2023-07-11 ASSESSMENT — PAIN DESCRIPTION - ORIENTATION
ORIENTATION: LEFT;RIGHT
ORIENTATION: LEFT;RIGHT

## 2023-07-11 ASSESSMENT — PAIN DESCRIPTION - DESCRIPTORS: DESCRIPTORS: ACHING

## 2023-07-11 ASSESSMENT — LIFESTYLE VARIABLES
HOW OFTEN DO YOU HAVE A DRINK CONTAINING ALCOHOL: NEVER
HOW MANY STANDARD DRINKS CONTAINING ALCOHOL DO YOU HAVE ON A TYPICAL DAY: PATIENT DOES NOT DRINK

## 2023-07-11 ASSESSMENT — PAIN DESCRIPTION - LOCATION
LOCATION: LEG
LOCATION: LEG
LOCATION: ABDOMEN

## 2023-07-11 ASSESSMENT — PAIN - FUNCTIONAL ASSESSMENT: PAIN_FUNCTIONAL_ASSESSMENT: 0-10

## 2023-07-11 NOTE — ED TRIAGE NOTES
Pt came via EMS from home. Pt checked her BS at home and her sugar was 42. She drank a half a gallon of orange juice ad crawled outside. When EMS arrived her sugar was 107. They gave her 15 grams of oral glucose and rechecked her sugar a second time and her sugar was 99. When pt arrived to hospital sugar was 84. Pt says she didn't loose consciousness. Peripheral vasculature in lower extremities shows pitting edema +2. Pt has bruise on left thigh the size of roughly 6 in long from a fall 2 days ago. Pt is 8 days out from being diagnosed with COVID, no current s/sx.

## 2023-07-11 NOTE — ED NOTES
Assumed care of pt at this time. Pt has multiple healing bruises on body.  Pt states these bruises are from her frequent falls at home due to her nystagmus and tremors            Janene Cardozo RN  07/11/23 2809

## 2023-07-11 NOTE — ED NOTES
Pt BG was 69 when re-checking. I gave her 8oz of orange juice, to help bring up her sugar. MD notified.       Matteo De Jesus RN  07/11/23 0521

## 2023-07-11 NOTE — H&P
COMPARISON: None. TECHNIQUE: Real-time sonography of the kidneys, retroperitoneum and bladder was performed with multiple static images obtained. FINDINGS: The kidneys have increased echogenicity with no mass, stone or hydronephrosis. The right kidney measures 9.9 cm and the left kidney measures 10.2 cm in length. The aorta is atherosclerotic but not aneurysmal.  The proximal iliac arteries are obscured by bowel gas. The IVC is normal. No retroperitoneal mass is identified. The urinary bladder is normal. Bladder volume is 355 mL     Medical renal disease. Transthoracic echocardiogram (TTE) complete with contrast, bubble, strain, and 3D PRN    Result Date: 6/23/2023    Left Ventricle: Normal left ventricular systolic function with a visually estimated EF of 65 - 70%. Left ventricle size is normal. Mildly increased wall thickness. Normal wall motion. Diastolic dysfunction present with normal LV EF. Left Atrium: Left atrium is mildly dilated. Right Atrium: Right atrium is mildly dilated. IVC/SVC: IVC diameter is greater than 21 mm and decreases less than 50% during inspiration; therefore the estimated right atrial pressure is elevated (~15 mmHg). Vascular duplex lower extremity venous bilateral    Result Date: 6/23/2023    No evidence of deep vein thrombosis in the right lower extremity. No evidence of deep vein or superficial vein thrombosis in the right lower extremity. Vessels demonstrate normal compressibility, color filling, and phasic and spontaneous flow. No evidence of deep vein thrombosis in the left lower extremity. No evidence of deep vein or superficial vein thrombosis in the left lower extremity. Vessels demonstrate normal compressibility, color filling, and phasic and spontaneous flow.       _______________________________________________________________________    TOTAL TIME:  76 Minutes    Critical Care Provided     Minutes non procedure based    Signed: Leodan Lomas

## 2023-07-11 NOTE — ED NOTES
SBAR telephone report given to [facility]., RN at this time  Pt still needs bumex given.      Rachel Lott, RN  07/11/23 6924

## 2023-07-11 NOTE — ED NOTES
Pt able to use bedside commode with assistance. Paged Nephrologist on perfect serve at this time.       Sharie Paget, RN  07/11/23 1217

## 2023-07-11 NOTE — ED NOTES
Bedside report given to [facility], [address], RN  07/11/23 3474

## 2023-07-11 NOTE — ED PROVIDER NOTES
[facility] EMERGENCY DEPT  EMERGENCY DEPARTMENT ENCOUNTER       Pt Name: Andrez Peters  MRN: [id number]  [facility] 1970  Date of evaluation: 7/11/2023  Provider: Jessie Chiu MD   PCP: Breezy Costa MD  Note Started: 6:09 AM EDT 7/11/23     CHIEF COMPLAINT       Chief Complaint   Patient presents with    Hypoglycemia     HISTORY OF PRESENT ILLNESS: 1 or more elements      History From: patient, History limited by: none     Andrez Peters is a 47 y.o. female with past medical history of type 1 diabetes, congestive heart failure, endometriosis with chronic pain presents emergency department with hypoglycemia this morning. She was feeling shaky and noticed her blood sugar was in the 40s. She called EMS. She reported she was in her blood sugar improved. Here in the emergency department her initial blood sugar was 84. She denies any other additional symptoms of cough or dysuria although of note she had a COVID infection and was hospitalized about a week and a half ago and continues to have a dry cough on exam.  She has 2+ pitting edema of her lower extremities that she reports is chronic. She also reports that she has some abdominal pain due to endometriosis and leg pain. Please See MDM for Additional Details of the HPI/PMH  Nursing Notes were all reviewed and agreed with or any disagreements were addressed in the HPI. REVIEW OF SYSTEMS      Positives and Pertinent negatives as per HPI.     PAST HISTORY     Past Medical History:  Past Medical History:   Diagnosis Date    Achilles tendon rupture     along with torn right patellar/femoral ligament    Arthritis     rt. foot    Chronic kidney disease     Chronic pain     abdominal pain    Diabetes (HCC)     IDDM on insulin pump and sensor    DM type 1 (diabetes mellitus, type 1) ([address] [facility])     age 25    Endometriosis     s/p ex-lap 4x    Gastric ulcer     GERD (gastroesophageal reflux disease)     Herpes     Other and unspecified hyperlipidemia     Panic

## 2023-07-11 NOTE — ED NOTES
NP with Hospitalist group at bedside. Pt POC BG is 154 at this time. Pt has had a soda and crackers.  NP Horacio Homans states that pt POC BG check can be  q 4 hrs at this time since pt POC BG's have been stable for 4 hrs     Char Srinivasan RN  07/11/23 1122 Otezla Counseling: The side effects of Otezla were discussed with the patient, including but not limited to worsening or new depression, weight loss, diarrhea, nausea, upper respiratory tract infection, and headache. Patient instructed to call the office should any adverse effect occur.  The patient verbalized understanding of the proper use and possible adverse effects of Otezla.  All the patient's questions and concerns were addressed.

## 2023-07-11 NOTE — WOUND CARE
Wound care nurse reviewed and EMR from the ED and examined the photos taken of the wounds. Pt. Has had some frequent dizzy spells at home and has fallen often. She has multiple areas of bruising on the legs and the trunk. Pt. Arrived to the ED with a recent hx of hypoglycemia that was symptomatic. Home treatment was successful but short-lived and she needed help from the ED. Pt. Also has some electrolyte abnormalities that are being treated. Pt. Has type 1 diabetes. Past Medical History:   Diagnosis Date    Achilles tendon rupture     along with torn right patellar/femoral ligament    Arthritis     rt. foot    Chronic kidney disease     Chronic pain     abdominal pain    Diabetes (HCC)     IDDM on insulin pump and sensor    DM type 1 (diabetes mellitus, type 1) ([address])     age 25    Endometriosis     s/p ex-lap 4x    Gastric ulcer     GERD (gastroesophageal reflux disease)     Herpes     Other and unspecified hyperlipidemia     Panic attacks     Psychiatric disorder     anxiety, panic attacks    PTSD (post-traumatic stress disorder)     Unspecified essential hypertension      Past Surgical History:   Procedure Laterality Date    COLONOSCOPY      GYN      mirena in place    GYN      2 d&c's    GYN      laparoscopies x5 for endometriosis    ORTHOPEDIC SURGERY      injections x2 to right shoulder    ORTHOPEDIC SURGERY  2002    achiles tendon repair,talus repair    ORTHOPEDIC SURGERY Left 02/07/2019    3rd trigger finger release    MO UNLISTED PROCEDURE CARDIAC SURGERY         Pt. Appears to have a history of recent history of a GI issue with the denuded skin on the buttocks, anus and the Sacrum has a healing pressure injury. The buttocks and anus healing skin may be from a recent outbreak of herpes. Will ask patient about this tomorrow when I see her in person. Plan: Preliminary orders placed for the Sacrum with Venelex ointment and foam dressing. Sacrum.   Pt. Needs to be turned every two hours and Float the

## 2023-07-11 NOTE — CONSULTS
Reviewed   [x]      Yes     []               No    Thank you for allowing us to participate in the care this patient. We will follow patient with you.   Signed By: Autumn Hankins, [address], Swati Feldman [facility]  [facility]  [address] [place] [address], [address] [place] [address]  Phone - [phone]         Fax - [phone] [facility] Office  [address] , [address] [facility], [address] [facility]  Phone - [phone]        Fax - [phone]

## 2023-07-12 PROBLEM — E10.649 HYPOGLYCEMIA DUE TO TYPE 1 DIABETES MELLITUS (HCC): Status: ACTIVE | Noted: 2023-07-12

## 2023-07-12 LAB
ANION GAP SERPL CALC-SCNC: 6 MMOL/L (ref 5–15)
BASOPHILS # BLD: 0 K/UL (ref 0–0.1)
BASOPHILS NFR BLD: 0 % (ref 0–1)
BUN SERPL-MCNC: 16 MG/DL (ref 6–20)
BUN/CREAT SERPL: 16 (ref 12–20)
CALCIUM SERPL-MCNC: 8 MG/DL (ref 8.5–10.1)
CHLORIDE SERPL-SCNC: 87 MMOL/L (ref 97–108)
CO2 SERPL-SCNC: 33 MMOL/L (ref 21–32)
COMMENT:: NORMAL
CREAT SERPL-MCNC: 1.02 MG/DL (ref 0.55–1.02)
DIFFERENTIAL METHOD BLD: ABNORMAL
EOSINOPHIL # BLD: 0.2 K/UL (ref 0–0.4)
EOSINOPHIL NFR BLD: 4 % (ref 0–7)
ERYTHROCYTE [DISTWIDTH] IN BLOOD BY AUTOMATED COUNT: 25.6 % (ref 11.5–14.5)
GLUCOSE BLD STRIP.AUTO-MCNC: 111 MG/DL (ref 65–117)
GLUCOSE BLD STRIP.AUTO-MCNC: 218 MG/DL (ref 65–117)
GLUCOSE BLD STRIP.AUTO-MCNC: 326 MG/DL (ref 65–117)
GLUCOSE BLD STRIP.AUTO-MCNC: 412 MG/DL (ref 65–117)
GLUCOSE BLD STRIP.AUTO-MCNC: 453 MG/DL (ref 65–117)
GLUCOSE SERPL-MCNC: 119 MG/DL (ref 65–100)
HCT VFR BLD AUTO: 28.1 % (ref 35–47)
HGB BLD-MCNC: 8.3 G/DL (ref 11.5–16)
IMM GRANULOCYTES # BLD AUTO: 0 K/UL (ref 0–0.04)
IMM GRANULOCYTES NFR BLD AUTO: 0 % (ref 0–0.5)
LYMPHOCYTES # BLD: 2.8 K/UL (ref 0.8–3.5)
LYMPHOCYTES NFR BLD: 46 % (ref 12–49)
MCH RBC QN AUTO: 21.5 PG (ref 26–34)
MCHC RBC AUTO-ENTMCNC: 29.5 G/DL (ref 30–36.5)
MCV RBC AUTO: 72.8 FL (ref 80–99)
MONOCYTES # BLD: 0.9 K/UL (ref 0–1)
MONOCYTES NFR BLD: 14 % (ref 5–13)
NEUTS SEG # BLD: 2.2 K/UL (ref 1.8–8)
NEUTS SEG NFR BLD: 36 % (ref 32–75)
NRBC # BLD: 0 K/UL (ref 0–0.01)
NRBC BLD-RTO: 0 PER 100 WBC
PLATELET # BLD AUTO: 352 K/UL (ref 150–400)
PMV BLD AUTO: 8.6 FL (ref 8.9–12.9)
POTASSIUM SERPL-SCNC: 4 MMOL/L (ref 3.5–5.1)
RBC # BLD AUTO: 3.86 M/UL (ref 3.8–5.2)
RBC MORPH BLD: ABNORMAL
SERVICE CMNT-IMP: ABNORMAL
SERVICE CMNT-IMP: NORMAL
SODIUM SERPL-SCNC: 124 MMOL/L (ref 136–145)
SODIUM SERPL-SCNC: 126 MMOL/L (ref 136–145)
SPECIMEN HOLD: NORMAL
WBC # BLD AUTO: 6.1 K/UL (ref 3.6–11)

## 2023-07-12 PROCEDURE — 1100000003 HC PRIVATE W/ TELEMETRY

## 2023-07-12 PROCEDURE — 6370000000 HC RX 637 (ALT 250 FOR IP)

## 2023-07-12 PROCEDURE — 2500000003 HC RX 250 WO HCPCS: Performed by: INTERNAL MEDICINE

## 2023-07-12 PROCEDURE — 82962 GLUCOSE BLOOD TEST: CPT

## 2023-07-12 PROCEDURE — 6360000002 HC RX W HCPCS

## 2023-07-12 PROCEDURE — 84295 ASSAY OF SERUM SODIUM: CPT

## 2023-07-12 PROCEDURE — 2580000003 HC RX 258

## 2023-07-12 PROCEDURE — 80048 BASIC METABOLIC PNL TOTAL CA: CPT

## 2023-07-12 PROCEDURE — 6370000000 HC RX 637 (ALT 250 FOR IP): Performed by: CLINICAL NURSE SPECIALIST

## 2023-07-12 PROCEDURE — 85025 COMPLETE CBC W/AUTO DIFF WBC: CPT

## 2023-07-12 PROCEDURE — 6370000000 HC RX 637 (ALT 250 FOR IP): Performed by: INTERNAL MEDICINE

## 2023-07-12 PROCEDURE — 36415 COLL VENOUS BLD VENIPUNCTURE: CPT

## 2023-07-12 PROCEDURE — 99222 1ST HOSP IP/OBS MODERATE 55: CPT | Performed by: CLINICAL NURSE SPECIALIST

## 2023-07-12 RX ORDER — INSULIN LISPRO 100 [IU]/ML
6 INJECTION, SOLUTION INTRAVENOUS; SUBCUTANEOUS
Status: DISCONTINUED | OUTPATIENT
Start: 2023-07-12 | End: 2023-07-13

## 2023-07-12 RX ORDER — MORPHINE SULFATE 15 MG/1
60 TABLET, FILM COATED, EXTENDED RELEASE ORAL EVERY 12 HOURS SCHEDULED
Status: DISCONTINUED | OUTPATIENT
Start: 2023-07-12 | End: 2023-07-14 | Stop reason: HOSPADM

## 2023-07-12 RX ORDER — INSULIN GLARGINE 100 [IU]/ML
30 INJECTION, SOLUTION SUBCUTANEOUS DAILY
Status: DISCONTINUED | OUTPATIENT
Start: 2023-07-12 | End: 2023-07-13

## 2023-07-12 RX ORDER — BENZONATATE 100 MG/1
100 CAPSULE ORAL 3 TIMES DAILY PRN
Status: DISCONTINUED | OUTPATIENT
Start: 2023-07-12 | End: 2023-07-14 | Stop reason: HOSPADM

## 2023-07-12 RX ORDER — GUAIFENESIN 600 MG/1
600 TABLET, EXTENDED RELEASE ORAL 2 TIMES DAILY
Status: DISCONTINUED | OUTPATIENT
Start: 2023-07-12 | End: 2023-07-14 | Stop reason: HOSPADM

## 2023-07-12 RX ADMIN — ALPRAZOLAM 2 MG: 0.5 TABLET ORAL at 13:17

## 2023-07-12 RX ADMIN — Medication 6 UNITS: at 16:49

## 2023-07-12 RX ADMIN — BENZONATATE 100 MG: 100 CAPSULE ORAL at 16:12

## 2023-07-12 RX ADMIN — FAMOTIDINE 40 MG: 20 TABLET, FILM COATED ORAL at 10:12

## 2023-07-12 RX ADMIN — MORPHINE SULFATE 60 MG: 15 TABLET, FILM COATED, EXTENDED RELEASE ORAL at 12:34

## 2023-07-12 RX ADMIN — BUSPIRONE HYDROCHLORIDE 15 MG: 10 TABLET ORAL at 21:44

## 2023-07-12 RX ADMIN — BUMETANIDE 1 MG: 0.25 INJECTION INTRAMUSCULAR; INTRAVENOUS at 10:04

## 2023-07-12 RX ADMIN — GABAPENTIN 300 MG: 300 CAPSULE ORAL at 21:44

## 2023-07-12 RX ADMIN — ROSUVASTATIN CALCIUM 40 MG: 40 TABLET, FILM COATED ORAL at 10:01

## 2023-07-12 RX ADMIN — SODIUM CHLORIDE, PRESERVATIVE FREE 10 ML: 5 INJECTION INTRAVENOUS at 10:15

## 2023-07-12 RX ADMIN — Medication: at 16:05

## 2023-07-12 RX ADMIN — BUSPIRONE HYDROCHLORIDE 15 MG: 10 TABLET ORAL at 14:40

## 2023-07-12 RX ADMIN — SODIUM CHLORIDE, PRESERVATIVE FREE 10 ML: 5 INJECTION INTRAVENOUS at 21:45

## 2023-07-12 RX ADMIN — ALPRAZOLAM 2 MG: 0.5 TABLET ORAL at 21:45

## 2023-07-12 RX ADMIN — Medication: at 10:15

## 2023-07-12 RX ADMIN — Medication: at 21:49

## 2023-07-12 RX ADMIN — BUSPIRONE HYDROCHLORIDE 15 MG: 10 TABLET ORAL at 10:04

## 2023-07-12 RX ADMIN — CITALOPRAM HYDROBROMIDE 40 MG: 20 TABLET ORAL at 10:03

## 2023-07-12 RX ADMIN — Medication 3 UNITS: at 12:32

## 2023-07-12 RX ADMIN — OXYCODONE HYDROCHLORIDE 5 MG: 5 TABLET ORAL at 10:58

## 2023-07-12 RX ADMIN — Medication 4 UNITS: at 16:49

## 2023-07-12 RX ADMIN — BUMETANIDE 1 MG: 0.25 INJECTION INTRAMUSCULAR; INTRAVENOUS at 21:44

## 2023-07-12 RX ADMIN — ENOXAPARIN SODIUM 40 MG: 100 INJECTION SUBCUTANEOUS at 10:04

## 2023-07-12 RX ADMIN — BREXPIPRAZOLE 2 MG: 1 TABLET ORAL at 14:40

## 2023-07-12 RX ADMIN — ASPIRIN 81 MG: 81 TABLET, COATED ORAL at 10:01

## 2023-07-12 RX ADMIN — INSULIN GLARGINE 30 UNITS: 100 INJECTION, SOLUTION SUBCUTANEOUS at 15:34

## 2023-07-12 RX ADMIN — GUAIFENESIN 600 MG: 600 TABLET, EXTENDED RELEASE ORAL at 21:45

## 2023-07-12 RX ADMIN — SODIUM CHLORIDE 1000 MG: 900 INJECTION INTRAVENOUS at 10:00

## 2023-07-12 RX ADMIN — ARIPIPRAZOLE 30 MG: 5 TABLET ORAL at 10:00

## 2023-07-12 RX ADMIN — GABAPENTIN 300 MG: 300 CAPSULE ORAL at 14:40

## 2023-07-12 RX ADMIN — GABAPENTIN 300 MG: 300 CAPSULE ORAL at 10:03

## 2023-07-12 ASSESSMENT — PAIN DESCRIPTION - DESCRIPTORS
DESCRIPTORS: ACHING;STABBING
DESCRIPTORS: STABBING
DESCRIPTORS: ACHING;STABBING

## 2023-07-12 ASSESSMENT — PAIN DESCRIPTION - LOCATION
LOCATION: ABDOMEN;BACK
LOCATION: ABDOMEN
LOCATION: ABDOMEN;BACK

## 2023-07-12 ASSESSMENT — PAIN SCALES - GENERAL
PAINLEVEL_OUTOF10: 9
PAINLEVEL_OUTOF10: 0
PAINLEVEL_OUTOF10: 8
PAINLEVEL_OUTOF10: 8

## 2023-07-12 NOTE — PROGRESS NOTES
Hospitalist Progress Note    NAME:   Roxanne Swanson   : 1970   MRN: [id number]     Date/Time: 2023 9:44 AM  Patient PCP: Vahe Mantilla MD    Estimated discharge date:   Barriers: Sodium correction, neurology clearance      Assessment / Plan:  Hypoglycemic episode 40s at home, 80s in ED, 150s now- resolved  Type 1 Diabetes mellitus  Insulin dependent  Accuchecks q4 for now  Sliding scale insulin  Monitor for s/s hypo/hyperglycemia  Hypoglycemia treatment per protocol  Diabetes management consulted  Will avoid long acting insulin for today  Regular diet for now, when BS stabilizes will add carb restrictions  : A1c of 7.9. Blood glucose has been sustained in the 110s. Hyponatremia Na 123  Volume overloaded vs SIADH? Hx hyponatremia as low as 118, hypervolemic hyponatremia in the past d/t HF/volume overload  No neurological involvement  Nephrology consulted, appreciate expertise  Resumed bumex  Serial sodium q6  Check urine and serum osm, urine sodium  Nephrology considering use of tolvaptan depending on Na trends  No neurological impairment evident at this time, close neuro monitoring  : Sodium of 126. Appreciate nephrology input. Plan is for fluid restriction, continue Bumex and follow-up with repeat BMP. Pyuria  UA(+) LE, WBCs, (-) bacteria  Started on Rocephin, will continue this for now pending C&S  Infection may explain hypoglycemic event  BC drawn and pending     CKD3 baseline sCr 1.3, is 1.26 today  Monitor BUN/Creat  Avoid known nephrotoxic agents   Replete electrolytes  Nephrology following  : BUN/creatinine of 16/1.0.     HFpEF without apparent exacerbation  proBNP 115  Chronic BLE edema  Not hypoxic, not overtly fluid overload except for LE chronic edema  Bumex resumed per nephrology  Resume ASA, bumex, statin    Anemia of CKD  : H&H of 8.3/28. 1.      Chronic pain 2/2 endometriosis on high dose PO morphine at home  Resumed gabapentin, abilify  Resumed

## 2023-07-12 NOTE — WOUND CARE
Wound Care consult for several wounds that are present on admission. Pt. Has fallen a few times over the past few weeks. She has had issues with hypoglycemia with her DM type 1. She is insulin dependent. She was also found to have hyponatremia without any neurological involvement. Her sodium was 122 yesterday but it has gotten as low as 116 in the past.   When asked about all of her bruises patient stated, \"we are moving and the house is a mess. The thigh bruises occurred when she fell over her exercise machine at home. This is also when the shin abrasions occurred. Pt. Has edema in both lower legs. When asked about the wound in the sacrum / gluteal cleft she stated, \"at [facility] they just let me sit in my own mess (stool). Unknown when this happened. Assessment: Pt. Was able to easily stand from the chair. There is a partial thickness fissure in the gluteal cleft with macerated edges. The skin / wound bed does Leonid well. The legs are edematous and have scabbed wounds on the shins. These are stable. Fissure and healing denuded skin on buttocks:                         Treatment Recommended:  Stop the Venelex for the sacrum. Cleanse the sacrum / gluteal wound with NS and gauze. Dry the skin well and then apply the Desitin Cream and keep the wound open to air. Encourage movement. For the legs: Cleanse with soap and water and dry well. Apply the Povidine-iodine swab and keep open to air. Elevate the legs and keep pillows under the calves to float her heels.      Radha Melendrez RN, BSN, [employer]

## 2023-07-13 LAB
ANION GAP SERPL CALC-SCNC: 8 MMOL/L (ref 5–15)
BACTERIA SPEC CULT: ABNORMAL
BASOPHILS # BLD: 0 K/UL (ref 0–0.1)
BASOPHILS NFR BLD: 0 % (ref 0–1)
BUN SERPL-MCNC: 16 MG/DL (ref 6–20)
BUN/CREAT SERPL: 15 (ref 12–20)
CALCIUM SERPL-MCNC: 8.7 MG/DL (ref 8.5–10.1)
CC UR VC: ABNORMAL
CHLORIDE SERPL-SCNC: 88 MMOL/L (ref 97–108)
CO2 SERPL-SCNC: 30 MMOL/L (ref 21–32)
CREAT SERPL-MCNC: 1.1 MG/DL (ref 0.55–1.02)
DIFFERENTIAL METHOD BLD: ABNORMAL
EOSINOPHIL # BLD: 0.2 K/UL (ref 0–0.4)
EOSINOPHIL NFR BLD: 3 % (ref 0–7)
ERYTHROCYTE [DISTWIDTH] IN BLOOD BY AUTOMATED COUNT: 25.7 % (ref 11.5–14.5)
GLUCOSE BLD STRIP.AUTO-MCNC: 173 MG/DL (ref 65–117)
GLUCOSE BLD STRIP.AUTO-MCNC: 230 MG/DL (ref 65–117)
GLUCOSE BLD STRIP.AUTO-MCNC: 277 MG/DL (ref 65–117)
GLUCOSE BLD STRIP.AUTO-MCNC: 312 MG/DL (ref 65–117)
GLUCOSE SERPL-MCNC: 186 MG/DL (ref 65–100)
HCT VFR BLD AUTO: 29.6 % (ref 35–47)
HGB BLD-MCNC: 8.9 G/DL (ref 11.5–16)
IMM GRANULOCYTES # BLD AUTO: 0 K/UL (ref 0–0.04)
IMM GRANULOCYTES NFR BLD AUTO: 0 % (ref 0–0.5)
LACTATE SERPL-SCNC: 1.1 MMOL/L (ref 0.4–2)
LYMPHOCYTES # BLD: 1.8 K/UL (ref 0.8–3.5)
LYMPHOCYTES NFR BLD: 35 % (ref 12–49)
MAGNESIUM SERPL-MCNC: 2.2 MG/DL (ref 1.6–2.4)
MCH RBC QN AUTO: 21.8 PG (ref 26–34)
MCHC RBC AUTO-ENTMCNC: 30.1 G/DL (ref 30–36.5)
MCV RBC AUTO: 72.4 FL (ref 80–99)
MONOCYTES # BLD: 0.7 K/UL (ref 0–1)
MONOCYTES NFR BLD: 13 % (ref 5–13)
NEUTS SEG # BLD: 2.3 K/UL (ref 1.8–8)
NEUTS SEG NFR BLD: 49 % (ref 32–75)
NRBC # BLD: 0 K/UL (ref 0–0.01)
NRBC BLD-RTO: 0 PER 100 WBC
PLATELET # BLD AUTO: 369 K/UL (ref 150–400)
PMV BLD AUTO: 8.6 FL (ref 8.9–12.9)
POTASSIUM SERPL-SCNC: 4 MMOL/L (ref 3.5–5.1)
RBC # BLD AUTO: 4.09 M/UL (ref 3.8–5.2)
RBC MORPH BLD: ABNORMAL
SERVICE CMNT-IMP: ABNORMAL
SODIUM SERPL-SCNC: 126 MMOL/L (ref 136–145)
WBC # BLD AUTO: 5 K/UL (ref 3.6–11)

## 2023-07-13 PROCEDURE — 6370000000 HC RX 637 (ALT 250 FOR IP): Performed by: INTERNAL MEDICINE

## 2023-07-13 PROCEDURE — 2580000003 HC RX 258

## 2023-07-13 PROCEDURE — 80048 BASIC METABOLIC PNL TOTAL CA: CPT

## 2023-07-13 PROCEDURE — 2500000003 HC RX 250 WO HCPCS: Performed by: INTERNAL MEDICINE

## 2023-07-13 PROCEDURE — 83735 ASSAY OF MAGNESIUM: CPT

## 2023-07-13 PROCEDURE — 85025 COMPLETE CBC W/AUTO DIFF WBC: CPT

## 2023-07-13 PROCEDURE — 6370000000 HC RX 637 (ALT 250 FOR IP): Performed by: CLINICAL NURSE SPECIALIST

## 2023-07-13 PROCEDURE — 6360000002 HC RX W HCPCS

## 2023-07-13 PROCEDURE — 82962 GLUCOSE BLOOD TEST: CPT

## 2023-07-13 PROCEDURE — 1100000003 HC PRIVATE W/ TELEMETRY

## 2023-07-13 PROCEDURE — 36415 COLL VENOUS BLD VENIPUNCTURE: CPT

## 2023-07-13 PROCEDURE — 83605 ASSAY OF LACTIC ACID: CPT

## 2023-07-13 PROCEDURE — 6370000000 HC RX 637 (ALT 250 FOR IP)

## 2023-07-13 RX ORDER — INSULIN LISPRO 100 [IU]/ML
10 INJECTION, SOLUTION INTRAVENOUS; SUBCUTANEOUS
Status: DISCONTINUED | OUTPATIENT
Start: 2023-07-13 | End: 2023-07-14 | Stop reason: HOSPADM

## 2023-07-13 RX ORDER — INSULIN GLARGINE 100 [IU]/ML
6 INJECTION, SOLUTION SUBCUTANEOUS ONCE
Status: COMPLETED | OUTPATIENT
Start: 2023-07-13 | End: 2023-07-13

## 2023-07-13 RX ORDER — INSULIN GLARGINE 100 [IU]/ML
36 INJECTION, SOLUTION SUBCUTANEOUS DAILY
Status: DISCONTINUED | OUTPATIENT
Start: 2023-07-14 | End: 2023-07-14 | Stop reason: HOSPADM

## 2023-07-13 RX ORDER — INSULIN LISPRO 100 [IU]/ML
0-8 INJECTION, SOLUTION INTRAVENOUS; SUBCUTANEOUS
Status: DISCONTINUED | OUTPATIENT
Start: 2023-07-13 | End: 2023-07-14 | Stop reason: HOSPADM

## 2023-07-13 RX ORDER — INSULIN LISPRO 100 [IU]/ML
5 INJECTION, SOLUTION INTRAVENOUS; SUBCUTANEOUS ONCE
Status: COMPLETED | OUTPATIENT
Start: 2023-07-13 | End: 2023-07-13

## 2023-07-13 RX ADMIN — ALPRAZOLAM 2 MG: 0.5 TABLET ORAL at 14:32

## 2023-07-13 RX ADMIN — Medication 5 UNITS: at 14:10

## 2023-07-13 RX ADMIN — GUAIFENESIN 600 MG: 600 TABLET, EXTENDED RELEASE ORAL at 10:30

## 2023-07-13 RX ADMIN — BUSPIRONE HYDROCHLORIDE 15 MG: 10 TABLET ORAL at 10:29

## 2023-07-13 RX ADMIN — MORPHINE SULFATE 60 MG: 15 TABLET, FILM COATED, EXTENDED RELEASE ORAL at 21:43

## 2023-07-13 RX ADMIN — Medication: at 10:45

## 2023-07-13 RX ADMIN — ARIPIPRAZOLE 30 MG: 5 TABLET ORAL at 10:29

## 2023-07-13 RX ADMIN — ASPIRIN 81 MG: 81 TABLET, COATED ORAL at 10:30

## 2023-07-13 RX ADMIN — BUMETANIDE 1 MG: 0.25 INJECTION INTRAMUSCULAR; INTRAVENOUS at 10:40

## 2023-07-13 RX ADMIN — BUMETANIDE 1 MG: 0.25 INJECTION INTRAMUSCULAR; INTRAVENOUS at 21:43

## 2023-07-13 RX ADMIN — GUAIFENESIN 600 MG: 600 TABLET, EXTENDED RELEASE ORAL at 21:43

## 2023-07-13 RX ADMIN — BUSPIRONE HYDROCHLORIDE 15 MG: 10 TABLET ORAL at 21:43

## 2023-07-13 RX ADMIN — Medication 6 UNITS: at 11:44

## 2023-07-13 RX ADMIN — GABAPENTIN 300 MG: 300 CAPSULE ORAL at 21:43

## 2023-07-13 RX ADMIN — FAMOTIDINE 40 MG: 20 TABLET, FILM COATED ORAL at 10:30

## 2023-07-13 RX ADMIN — SODIUM CHLORIDE, PRESERVATIVE FREE 10 ML: 5 INJECTION INTRAVENOUS at 10:44

## 2023-07-13 RX ADMIN — Medication: at 10:44

## 2023-07-13 RX ADMIN — ROSUVASTATIN CALCIUM 40 MG: 40 TABLET, FILM COATED ORAL at 10:30

## 2023-07-13 RX ADMIN — INSULIN GLARGINE 30 UNITS: 100 INJECTION, SOLUTION SUBCUTANEOUS at 10:29

## 2023-07-13 RX ADMIN — INSULIN GLARGINE 6 UNITS: 100 INJECTION, SOLUTION SUBCUTANEOUS at 14:10

## 2023-07-13 RX ADMIN — BUSPIRONE HYDROCHLORIDE 15 MG: 10 TABLET ORAL at 14:10

## 2023-07-13 RX ADMIN — SODIUM CHLORIDE, PRESERVATIVE FREE 10 ML: 5 INJECTION INTRAVENOUS at 21:44

## 2023-07-13 RX ADMIN — Medication: at 21:48

## 2023-07-13 RX ADMIN — MORPHINE SULFATE 60 MG: 15 TABLET, FILM COATED, EXTENDED RELEASE ORAL at 10:30

## 2023-07-13 RX ADMIN — GABAPENTIN 300 MG: 300 CAPSULE ORAL at 14:10

## 2023-07-13 RX ADMIN — Medication 2 UNITS: at 09:14

## 2023-07-13 RX ADMIN — BUTALBITAL, ACETAMINOPHEN, AND CAFFEINE 1 TABLET: 50; 325; 40 TABLET ORAL at 11:36

## 2023-07-13 RX ADMIN — CITALOPRAM HYDROBROMIDE 40 MG: 20 TABLET ORAL at 10:30

## 2023-07-13 RX ADMIN — GABAPENTIN 300 MG: 300 CAPSULE ORAL at 10:30

## 2023-07-13 RX ADMIN — Medication: at 14:11

## 2023-07-13 RX ADMIN — Medication 10 UNITS: at 17:47

## 2023-07-13 RX ADMIN — BREXPIPRAZOLE 2 MG: 1 TABLET ORAL at 10:39

## 2023-07-13 RX ADMIN — ENOXAPARIN SODIUM 40 MG: 100 INJECTION SUBCUTANEOUS at 10:31

## 2023-07-13 RX ADMIN — Medication 6 UNITS: at 09:14

## 2023-07-13 RX ADMIN — Medication 3 UNITS: at 11:43

## 2023-07-13 RX ADMIN — Medication: at 21:44

## 2023-07-13 RX ADMIN — SODIUM CHLORIDE 1000 MG: 900 INJECTION INTRAVENOUS at 09:15

## 2023-07-13 ASSESSMENT — PAIN SCALES - GENERAL
PAINLEVEL_OUTOF10: 0
PAINLEVEL_OUTOF10: 0
PAINLEVEL_OUTOF10: 8
PAINLEVEL_OUTOF10: 9

## 2023-07-13 ASSESSMENT — PAIN DESCRIPTION - DESCRIPTORS: DESCRIPTORS: STABBING

## 2023-07-13 ASSESSMENT — PAIN DESCRIPTION - LOCATION: LOCATION: ABDOMEN;BACK

## 2023-07-13 NOTE — ADT AUTH CERT
Hospitalist Progress Note     NAME:              Augusta Christie   :   1970   MRN:   [id number]      Date/Time: 2023 8:29 AM  Patient PCP: Charles Rodríguez MD     Estimated discharge date:   Barriers: Sodium correction, neurology clearance        Assessment / Plan:  Hypoglycemic episode 40s at home, 80s in ED, 150s now- resolved  Type 1 Diabetes mellitus  Insulin dependent  Accuchecks q4 for now  Sliding scale insulin  Monitor for s/s hypo/hyperglycemia  Hypoglycemia treatment per protocol  Diabetes management consulted  Will avoid long acting insulin for today  Regular diet for now, when BS stabilizes will add carb restrictions  : A1c of 7.9. Blood glucose has been sustained in the 110s. Hyponatremia Na 123  Volume overloaded vs SIADH? Hx hyponatremia as low as 118, hypervolemic hyponatremia in the past d/t HF/volume overload  No neurological involvement  Nephrology consulted, appreciate expertise  Resumed bumex  Serial sodium q6  Check urine and serum osm, urine sodium  Nephrology considering use of tolvaptan depending on Na trends  No neurological impairment evident at this time, close neuro monitoring  : Sodium of 126. Appreciate nephrology input. Plan is for fluid restriction, continue Bumex and follow-up with repeat BMP.  : Sodium is 126. Daily weights. Monitor I's and O's. Pyuria  UA(+) LE, WBCs, (-) bacteria  Started on Rocephin, will continue this for now pending C&S  Infection may explain hypoglycemic event  BC drawn and pending     CKD3 baseline sCr 1.3, is 1.26 today  Monitor BUN/Creat  Avoid known nephrotoxic agents   Replete electrolytes  Nephrology following  : BUN/creatinine of 16/1.0.     HFpEF without apparent exacerbation  proBNP 115  Chronic BLE edema  Not hypoxic, not overtly fluid overload except for LE chronic edema  Bumex resumed per nephrology  Resume ASA, bumex, statin     Anemia of CKD  : H&H of 8.3/28. 1.      Chronic pain 2/2

## 2023-07-13 NOTE — PROGRESS NOTES
Hospitalist Progress Note    NAME:   Kennedi Martines   : 1970   MRN: [id number]     Date/Time: 2023 8:29 AM  Patient PCP: Tamika Mathew MD    Estimated discharge date:   Barriers: Sodium correction, neurology clearance        Assessment / Plan:  Hypoglycemic episode 40s at home, 80s in ED, 150s now- resolved  Type 1 Diabetes mellitus  Insulin dependent  Accuchecks q4 for now  Sliding scale insulin  Monitor for s/s hypo/hyperglycemia  Hypoglycemia treatment per protocol  Diabetes management consulted  Will avoid long acting insulin for today  Regular diet for now, when BS stabilizes will add carb restrictions  : A1c of 7.9. Blood glucose has been sustained in the 110s. Hyponatremia Na 123  Volume overloaded vs SIADH? Hx hyponatremia as low as 118, hypervolemic hyponatremia in the past d/t HF/volume overload  No neurological involvement  Nephrology consulted, appreciate expertise  Resumed bumex  Serial sodium q6  Check urine and serum osm, urine sodium  Nephrology considering use of tolvaptan depending on Na trends  No neurological impairment evident at this time, close neuro monitoring  : Sodium of 126. Appreciate nephrology input. Plan is for fluid restriction, continue Bumex and follow-up with repeat BMP.  : Sodium is 126. Daily weights. Monitor I's and O's. Pyuria  UA(+) LE, WBCs, (-) bacteria  Started on Rocephin, will continue this for now pending C&S  Infection may explain hypoglycemic event  BC drawn and pending     CKD3 baseline sCr 1.3, is 1.26 today  Monitor BUN/Creat  Avoid known nephrotoxic agents   Replete electrolytes  Nephrology following  : BUN/creatinine of 16/1.0.     HFpEF without apparent exacerbation  proBNP 115  Chronic BLE edema  Not hypoxic, not overtly fluid overload except for LE chronic edema  Bumex resumed per nephrology  Resume ASA, bumex, statin     Anemia of CKD  : H&H of 8.3/28. 1.      Chronic pain 2/2 endometriosis on high dose

## 2023-07-14 VITALS
HEART RATE: 77 BPM | SYSTOLIC BLOOD PRESSURE: 123 MMHG | HEIGHT: 65 IN | WEIGHT: 186.29 LBS | TEMPERATURE: 98.6 F | BODY MASS INDEX: 31.04 KG/M2 | DIASTOLIC BLOOD PRESSURE: 58 MMHG | RESPIRATION RATE: 21 BRPM | OXYGEN SATURATION: 90 %

## 2023-07-14 LAB
ANION GAP SERPL CALC-SCNC: 5 MMOL/L (ref 5–15)
BASOPHILS # BLD: 0 K/UL (ref 0–0.1)
BASOPHILS NFR BLD: 0 % (ref 0–1)
BUN SERPL-MCNC: 19 MG/DL (ref 6–20)
BUN/CREAT SERPL: 17 (ref 12–20)
CALCIUM SERPL-MCNC: 8.3 MG/DL (ref 8.5–10.1)
CHLORIDE SERPL-SCNC: 94 MMOL/L (ref 97–108)
CO2 SERPL-SCNC: 30 MMOL/L (ref 21–32)
CREAT SERPL-MCNC: 1.11 MG/DL (ref 0.55–1.02)
DIFFERENTIAL METHOD BLD: ABNORMAL
EOSINOPHIL # BLD: 0.1 K/UL (ref 0–0.4)
EOSINOPHIL NFR BLD: 3 % (ref 0–7)
ERYTHROCYTE [DISTWIDTH] IN BLOOD BY AUTOMATED COUNT: 25.9 % (ref 11.5–14.5)
GLUCOSE BLD STRIP.AUTO-MCNC: 144 MG/DL (ref 65–117)
GLUCOSE BLD STRIP.AUTO-MCNC: 290 MG/DL (ref 65–117)
GLUCOSE BLD STRIP.AUTO-MCNC: 397 MG/DL (ref 65–117)
GLUCOSE BLD STRIP.AUTO-MCNC: 441 MG/DL (ref 65–117)
GLUCOSE SERPL-MCNC: 239 MG/DL (ref 65–100)
HCT VFR BLD AUTO: 26.4 % (ref 35–47)
HGB BLD-MCNC: 8 G/DL (ref 11.5–16)
IMM GRANULOCYTES # BLD AUTO: 0 K/UL (ref 0–0.04)
IMM GRANULOCYTES NFR BLD AUTO: 0 % (ref 0–0.5)
LYMPHOCYTES # BLD: 2.1 K/UL (ref 0.8–3.5)
LYMPHOCYTES NFR BLD: 48 % (ref 12–49)
MAGNESIUM SERPL-MCNC: 2.2 MG/DL (ref 1.6–2.4)
MCH RBC QN AUTO: 21.9 PG (ref 26–34)
MCHC RBC AUTO-ENTMCNC: 30.3 G/DL (ref 30–36.5)
MCV RBC AUTO: 72.1 FL (ref 80–99)
MONOCYTES # BLD: 0.6 K/UL (ref 0–1)
MONOCYTES NFR BLD: 13 % (ref 5–13)
NEUTS SEG # BLD: 1.5 K/UL (ref 1.8–8)
NEUTS SEG NFR BLD: 36 % (ref 32–75)
NRBC # BLD: 0 K/UL (ref 0–0.01)
NRBC BLD-RTO: 0 PER 100 WBC
PLATELET # BLD AUTO: 301 K/UL (ref 150–400)
PMV BLD AUTO: 8.8 FL (ref 8.9–12.9)
POTASSIUM SERPL-SCNC: 3.9 MMOL/L (ref 3.5–5.1)
RBC # BLD AUTO: 3.66 M/UL (ref 3.8–5.2)
RBC MORPH BLD: ABNORMAL
SERVICE CMNT-IMP: ABNORMAL
SODIUM SERPL-SCNC: 129 MMOL/L (ref 136–145)
SODIUM SERPL-SCNC: 130 MMOL/L (ref 136–145)
WBC # BLD AUTO: 4.3 K/UL (ref 3.6–11)

## 2023-07-14 PROCEDURE — 83735 ASSAY OF MAGNESIUM: CPT

## 2023-07-14 PROCEDURE — 2500000003 HC RX 250 WO HCPCS: Performed by: INTERNAL MEDICINE

## 2023-07-14 PROCEDURE — 36415 COLL VENOUS BLD VENIPUNCTURE: CPT

## 2023-07-14 PROCEDURE — 6370000000 HC RX 637 (ALT 250 FOR IP): Performed by: INTERNAL MEDICINE

## 2023-07-14 PROCEDURE — 6370000000 HC RX 637 (ALT 250 FOR IP)

## 2023-07-14 PROCEDURE — 82962 GLUCOSE BLOOD TEST: CPT

## 2023-07-14 PROCEDURE — 6360000002 HC RX W HCPCS

## 2023-07-14 PROCEDURE — 80048 BASIC METABOLIC PNL TOTAL CA: CPT

## 2023-07-14 PROCEDURE — 84295 ASSAY OF SERUM SODIUM: CPT

## 2023-07-14 PROCEDURE — 6370000000 HC RX 637 (ALT 250 FOR IP): Performed by: CLINICAL NURSE SPECIALIST

## 2023-07-14 PROCEDURE — 85025 COMPLETE CBC W/AUTO DIFF WBC: CPT

## 2023-07-14 PROCEDURE — 2580000003 HC RX 258

## 2023-07-14 RX ORDER — BUMETANIDE 1 MG/1
1 TABLET ORAL 2 TIMES DAILY
Status: DISCONTINUED | OUTPATIENT
Start: 2023-07-14 | End: 2023-07-14 | Stop reason: HOSPADM

## 2023-07-14 RX ADMIN — ROSUVASTATIN CALCIUM 40 MG: 40 TABLET, FILM COATED ORAL at 08:55

## 2023-07-14 RX ADMIN — GUAIFENESIN 600 MG: 600 TABLET, EXTENDED RELEASE ORAL at 08:57

## 2023-07-14 RX ADMIN — ALPRAZOLAM 2 MG: 0.5 TABLET ORAL at 00:29

## 2023-07-14 RX ADMIN — INSULIN LISPRO 4 UNITS: 100 INJECTION, SOLUTION INTRAVENOUS; SUBCUTANEOUS at 09:05

## 2023-07-14 RX ADMIN — Medication 10 UNITS: at 10:36

## 2023-07-14 RX ADMIN — GABAPENTIN 300 MG: 300 CAPSULE ORAL at 08:57

## 2023-07-14 RX ADMIN — BREXPIPRAZOLE 2 MG: 1 TABLET ORAL at 10:37

## 2023-07-14 RX ADMIN — Medication 10 UNITS: at 11:32

## 2023-07-14 RX ADMIN — SODIUM CHLORIDE 1000 MG: 900 INJECTION INTRAVENOUS at 08:55

## 2023-07-14 RX ADMIN — ARIPIPRAZOLE 30 MG: 5 TABLET ORAL at 08:54

## 2023-07-14 RX ADMIN — BUSPIRONE HYDROCHLORIDE 15 MG: 10 TABLET ORAL at 08:55

## 2023-07-14 RX ADMIN — INSULIN GLARGINE 36 UNITS: 100 INJECTION, SOLUTION SUBCUTANEOUS at 08:53

## 2023-07-14 RX ADMIN — Medication: at 09:03

## 2023-07-14 RX ADMIN — ALPRAZOLAM 2 MG: 0.5 TABLET ORAL at 12:36

## 2023-07-14 RX ADMIN — BUMETANIDE 1 MG: 0.25 INJECTION INTRAMUSCULAR; INTRAVENOUS at 08:57

## 2023-07-14 RX ADMIN — ASPIRIN 81 MG: 81 TABLET, COATED ORAL at 08:55

## 2023-07-14 RX ADMIN — SODIUM CHLORIDE, PRESERVATIVE FREE 10 ML: 5 INJECTION INTRAVENOUS at 10:37

## 2023-07-14 RX ADMIN — MORPHINE SULFATE 60 MG: 15 TABLET, FILM COATED, EXTENDED RELEASE ORAL at 08:55

## 2023-07-14 RX ADMIN — FAMOTIDINE 40 MG: 20 TABLET, FILM COATED ORAL at 08:57

## 2023-07-14 RX ADMIN — ENOXAPARIN SODIUM 40 MG: 100 INJECTION SUBCUTANEOUS at 08:54

## 2023-07-14 RX ADMIN — CITALOPRAM HYDROBROMIDE 40 MG: 20 TABLET ORAL at 08:56

## 2023-07-14 NOTE — PLAN OF CARE
Problem: Discharge Planning  Goal: Discharge to home or other facility with appropriate resources  7/14/2023 0243 by Kely Miller RN  Outcome: Progressing  7/13/2023 1650 by Duane Manns, RN  Outcome: Progressing     Problem: Pain  Goal: Verbalizes/displays adequate comfort level or baseline comfort level  7/14/2023 0243 by Kely Miller RN  Outcome: Progressing  7/13/2023 1650 by Duane Manns, RN  Outcome: Progressing     Problem: Safety - Adult  Goal: Free from fall injury  7/14/2023 0243 by Kely Miller RN  Outcome: Progressing  7/13/2023 1650 by Duane Manns, RN  Outcome: Progressing     Problem: Skin/Tissue Integrity  Goal: Absence of new skin breakdown  Description: 1. Monitor for areas of redness and/or skin breakdown  2. Assess vascular access sites hourly  3. Every 4-6 hours minimum:  Change oxygen saturation probe site  4. Every 4-6 hours:  If on nasal continuous positive airway pressure, respiratory therapy assess nares and determine need for appliance change or resting period.   7/14/2023 0243 by Kely Miller RN  Outcome: Progressing  7/13/2023 1650 by Duane Manns, RN  Outcome: Progressing     Problem: Chronic Conditions and Co-morbidities  Goal: Patient's chronic conditions and co-morbidity symptoms are monitored and maintained or improved  7/14/2023 0243 by Kely Miller RN  Outcome: Progressing  7/13/2023 1650 by Duane Manns, RN  Outcome: Progressing

## 2023-07-14 NOTE — PROGRESS NOTES
PCP hospital follow-up transitional care appointment has been scheduled with Dr. Hardeep Foreman. Rosetta Perdomo on 7/18/23 1030. Pending patient discharge.   Hamida Mccord, Care Management Assistant

## 2023-07-14 NOTE — DISCHARGE SUMMARY
Discharge Summary    Name: Cruz Roberto  [id number]  YOB: 1970 (Age: 47 y.o.)   Date of Admission: 7/11/2023  Date of Discharge: 7/14/2023  Attending Physician: Lakesha Ayoub MD    Discharge Diagnosis:   Hypoglycemic episode 40s at home, 80s in ED, 150s now- resolved  Type 1 Diabetes mellitus  Insulin dependent  Hyponatremia Na 123 improved to 130  Volume overloaded vs SIADH? Hx hyponatremia as low as 118, hypervolemic hyponatremia in the past d/t HF/volume overload  No neurological involvement  Pyuria  CKD3 baseline sCr 1.3, is 1.26 today  HFpEF without apparent exacerbation  Anemia of CKD  Chronic pain 2/2 endometriosis on high dose PO morphine at home  Recent COVID-19 infection and hospitalization  Sacral skin breakdown present on admission  HTN  HLD  GERD      Consultations:  IP CONSULT TO NEPHROLOGY  IP CONSULT TO HOSPITALIST  IP CONSULT TO DIABETES MANAGEMENT  IP WOUND CARE NURSE CONSULT TO EVAL      Brief Admission History/Reason for Admission Per Krystyna Corrigan MD:   Enriqueta Blanco is a 47 y.o.  female with PMHx significant for type 1 DM, HFpEF, HTN, HLD, chronic hyponatremia, chronic anemia, CKD, GERD, chronic pain due to endometriosis on high dose PO morphine at home, chronic BLE edema, recently hospitalized for COVID-19 on 6/23-6/30. She presented to ED today after hypoglycemic episode at home after taking her AM insulins, BS 40s and improved to 80s by arrival to ED. Pt reports she has been eating and drinking as usual, taking insulins, and in her usual state of health up until this morning. Her blood sugars have improved since arrival but her labwork was also revealing for hyponatremia 123. Her UA demonstrated pyuria although she denies urinary symptoms, is afebrile, and WBC nl. We were asked to admit for work up and evaluation of the above problems.         Brief Hospital Course by Main Problems:   Hypoglycemic episode 40s at home, 80s in ED,

## 2023-07-14 NOTE — CARE COORDINATION
Transition of Care Plan:    RUR: 23%  Prior Level of Functioning: independent   Disposition: home with significant other   If SNF or IPR: Date FOC offered:   Date FOC received:   Accepting facility:   Date authorization started with reference number:   Date authorization received and expires: Follow up appointments: PCP   DME needed: N/A  Transportation at discharge: significant other, ETA 4:30PM  IM/IMM Medicare/ letter given: given  Is patient a  and connected with VA? no   If yes, was [employer] transfer form completed and VA notified? Caregiver Contact: N/A  Discharge Caregiver contacted prior to discharge? no  Care Conference needed? no  Barriers to discharge:  none         07/14/23 Pr[facility] [place] Discharge   Transition of Care Consult (CM Consult) Discharge [facility] None   [employer] Information Provided? No   Mode of Transport at Discharge Other (see comment)  (significant other)   Hospital Transport Time of Discharge 1500   Confirm Follow Up Transport Family   Condition of Participation: Discharge Planning   The Plan for Transition of Care is related to the following treatment goals: N/A- no services indicated   The Patient and/or Patient Representative was provided with a Choice of Provider? Patient   The Patient and/Or Patient Representative agree with the Discharge Plan? Yes   Freedom of Choice list was provided with basic dialogue that supports the patient's individualized plan of care/goals, treatment preferences, and shares the quality data associated with the providers? No  (N/A)       Chart reviewed. CM aware of discharge order. Met with pt at bedside to finalize and review d/c plan. Pt's significant other will transport her home today. 2nd IMM delivered. No further CM needs identified. Pt ready for d/c from CM standpoint.      Matteo Mcneil, BARBRA   Care Manager, [facility]
transition of care planning. Pt resides with her significant other, Duke, in 1 level home with no steps to enter. She has a rolling walker which she uses to ambulate as needed. Pt reports being on oxygen at home, 3L 24/7. Pt is not requiring oxygen in ED at this time, pt reports that this is likely because she is not smoking. Pt's significant other will transport her home. Pharmacy confirmed: [facility] [address]. Pt reports last seeing her PCP 2 weeks ago. She also follows with endocrinologist, Dr. Shannon Melendez. Pt denies concerns with her diabetes medicine regimen, reports that she feels confident in administering. Diabetes management has also been consulted to review as pt presented with hypoglycemia. Care management will continue to be available to assist as transition of care planning needs arise.       Niels Siegel, [address] [facility], [address] [facility]  [phone]/Available on [employer]

## 2023-07-14 NOTE — PROGRESS NOTES
Spiritual Care Partner Volunteer visited patient at [facility] in [facility] 3 MED TELE on 7/14/2023   Documented by:    THIERRY Alvarez  [address]   Paging Service [phone]

## 2023-07-14 NOTE — PROGRESS NOTES
1515: Patients IV removed. AVS printed and reviewed with patient. Patient transported to [facility] via wheelchair. Patient discharged home with family.

## 2023-07-15 RX ORDER — IBUPROFEN 600 MG/1
TABLET ORAL
Qty: 2 KIT | Refills: 11 | Status: SHIPPED | OUTPATIENT
Start: 2023-07-15

## 2023-07-25 ENCOUNTER — TELEPHONE (OUTPATIENT)
Age: 53
End: 2023-07-25

## 2023-07-26 NOTE — PROGRESS NOTES
Pt called noting that she is having an issue of a low BG. She is currently taking Lantau 35 units every morning and Humalog 10 units with each meal. She reports that the last insulin she took was the Lantus 35 units this AM. He BG is currently 45. I recommended she drink 8 oz of apple juice and repeat her BG in 2 hours. Pt instructed to decrease her Lantus from 35 units to 30 units daily, till she sees Dr. Jos Wells.

## 2023-07-27 ENCOUNTER — HOSPITAL ENCOUNTER (INPATIENT)
Facility: HOSPITAL | Age: 53
LOS: 4 days | Discharge: HOME | DRG: 641 | End: 2023-07-31
Attending: STUDENT IN AN ORGANIZED HEALTH CARE EDUCATION/TRAINING PROGRAM | Admitting: INTERNAL MEDICINE
Payer: MEDICARE

## 2023-07-27 DIAGNOSIS — M79.662 PAIN IN BOTH LOWER LEGS: Primary | ICD-10-CM

## 2023-07-27 DIAGNOSIS — E87.1 HYPONATREMIA: ICD-10-CM

## 2023-07-27 DIAGNOSIS — M79.661 PAIN IN BOTH LOWER LEGS: Primary | ICD-10-CM

## 2023-07-27 LAB
ALBUMIN SERPL-MCNC: 3.3 G/DL (ref 3.5–5)
ALBUMIN/GLOB SERPL: 0.9 (ref 1.1–2.2)
ALP SERPL-CCNC: 83 U/L (ref 45–117)
ALT SERPL-CCNC: 29 U/L (ref 12–78)
ANION GAP BLD CALC-SCNC: 6 (ref 10–20)
ANION GAP SERPL CALC-SCNC: 8 MMOL/L (ref 5–15)
APPEARANCE UR: CLEAR
AST SERPL-CCNC: 52 U/L (ref 15–37)
BACTERIA URNS QL MICRO: NEGATIVE /HPF
BASE EXCESS BLD CALC-SCNC: 8.4 MMOL/L
BASOPHILS # BLD: 0 K/UL (ref 0–0.1)
BASOPHILS NFR BLD: 0 % (ref 0–1)
BILIRUB SERPL-MCNC: 0.4 MG/DL (ref 0.2–1)
BILIRUB UR QL: NEGATIVE
BUN SERPL-MCNC: 18 MG/DL (ref 6–20)
BUN/CREAT SERPL: 17 (ref 12–20)
CA-I BLD-MCNC: 1.09 MMOL/L (ref 1.12–1.32)
CALCIUM SERPL-MCNC: 8.5 MG/DL (ref 8.5–10.1)
CHLORIDE BLD-SCNC: 84 MMOL/L (ref 100–108)
CHLORIDE SERPL-SCNC: 85 MMOL/L (ref 97–108)
CO2 BLD-SCNC: 34 MMOL/L (ref 19–24)
CO2 SERPL-SCNC: 31 MMOL/L (ref 21–32)
COLOR UR: ABNORMAL
COMMENT:: NORMAL
CREAT SERPL-MCNC: 1.07 MG/DL (ref 0.55–1.02)
DIFFERENTIAL METHOD BLD: ABNORMAL
EOSINOPHIL # BLD: 0.1 K/UL (ref 0–0.4)
EOSINOPHIL NFR BLD: 1 % (ref 0–7)
EPITH CASTS URNS QL MICRO: ABNORMAL /LPF
ERYTHROCYTE [DISTWIDTH] IN BLOOD BY AUTOMATED COUNT: 25.4 % (ref 11.5–14.5)
GLOBULIN SER CALC-MCNC: 3.8 G/DL (ref 2–4)
GLUCOSE BLD STRIP.AUTO-MCNC: 173 MG/DL (ref 65–117)
GLUCOSE BLD STRIP.AUTO-MCNC: 261 MG/DL (ref 74–106)
GLUCOSE SERPL-MCNC: 182 MG/DL (ref 65–100)
GLUCOSE UR STRIP.AUTO-MCNC: NEGATIVE MG/DL
HCO3 BLDA-SCNC: 35 MMOL/L
HCT VFR BLD AUTO: 29.3 % (ref 35–47)
HGB BLD-MCNC: 9.1 G/DL (ref 11.5–16)
HGB UR QL STRIP: NEGATIVE
HYALINE CASTS URNS QL MICRO: ABNORMAL /LPF (ref 0–2)
IMM GRANULOCYTES # BLD AUTO: 0 K/UL (ref 0–0.04)
IMM GRANULOCYTES NFR BLD AUTO: 0 % (ref 0–0.5)
KETONES UR QL STRIP.AUTO: NEGATIVE MG/DL
LACTATE BLD-SCNC: <0.4 MMOL/L (ref 0.4–2)
LEUKOCYTE ESTERASE UR QL STRIP.AUTO: ABNORMAL
LYMPHOCYTES # BLD: 1.9 K/UL (ref 0.8–3.5)
LYMPHOCYTES NFR BLD: 33 % (ref 12–49)
MAGNESIUM SERPL-MCNC: 2 MG/DL (ref 1.6–2.4)
MCH RBC QN AUTO: 22.1 PG (ref 26–34)
MCHC RBC AUTO-ENTMCNC: 31.1 G/DL (ref 30–36.5)
MCV RBC AUTO: 71.3 FL (ref 80–99)
MONOCYTES # BLD: 0.6 K/UL (ref 0–1)
MONOCYTES NFR BLD: 11 % (ref 5–13)
NEUTS SEG # BLD: 3.1 K/UL (ref 1.8–8)
NEUTS SEG NFR BLD: 55 % (ref 32–75)
NITRITE UR QL STRIP.AUTO: NEGATIVE
NRBC # BLD: 0 K/UL (ref 0–0.01)
NRBC BLD-RTO: 0 PER 100 WBC
PCO2 BLDV: 56 MMHG (ref 41–51)
PH BLDV: 7.4 (ref 7.32–7.42)
PH UR STRIP: 7 (ref 5–8)
PLATELET # BLD AUTO: 364 K/UL (ref 150–400)
PMV BLD AUTO: 9.9 FL (ref 8.9–12.9)
PO2 BLDV: <13 MMHG (ref 25–40)
POTASSIUM BLD-SCNC: 4.3 MMOL/L (ref 3.5–5.5)
POTASSIUM SERPL-SCNC: 3.8 MMOL/L (ref 3.5–5.1)
PROT SERPL-MCNC: 7.1 G/DL (ref 6.4–8.2)
PROT UR STRIP-MCNC: NEGATIVE MG/DL
RBC # BLD AUTO: 4.11 M/UL (ref 3.8–5.2)
RBC #/AREA URNS HPF: ABNORMAL /HPF (ref 0–5)
RBC MORPH BLD: ABNORMAL
SERVICE CMNT-IMP: ABNORMAL
SODIUM BLD-SCNC: 124 MMOL/L (ref 136–145)
SODIUM SERPL-SCNC: 124 MMOL/L (ref 136–145)
SP GR UR REFRACTOMETRY: <1.005
SPECIMEN HOLD: NORMAL
SPECIMEN SITE: ABNORMAL
URINE CULTURE IF INDICATED: ABNORMAL
UROBILINOGEN UR QL STRIP.AUTO: 0.2 EU/DL (ref 0.2–1)
WBC # BLD AUTO: 5.7 K/UL (ref 3.6–11)
WBC URNS QL MICRO: ABNORMAL /HPF (ref 0–4)

## 2023-07-27 PROCEDURE — 2580000003 HC RX 258: Performed by: INTERNAL MEDICINE

## 2023-07-27 PROCEDURE — 81001 URINALYSIS AUTO W/SCOPE: CPT

## 2023-07-27 PROCEDURE — 6360000002 HC RX W HCPCS: Performed by: INTERNAL MEDICINE

## 2023-07-27 PROCEDURE — 2700000000 HC OXYGEN THERAPY PER DAY

## 2023-07-27 PROCEDURE — 84295 ASSAY OF SERUM SODIUM: CPT

## 2023-07-27 PROCEDURE — 82803 BLOOD GASES ANY COMBINATION: CPT

## 2023-07-27 PROCEDURE — 83735 ASSAY OF MAGNESIUM: CPT

## 2023-07-27 PROCEDURE — 2500000003 HC RX 250 WO HCPCS: Performed by: INTERNAL MEDICINE

## 2023-07-27 PROCEDURE — 96374 THER/PROPH/DIAG INJ IV PUSH: CPT

## 2023-07-27 PROCEDURE — 2500000003 HC RX 250 WO HCPCS: Performed by: STUDENT IN AN ORGANIZED HEALTH CARE EDUCATION/TRAINING PROGRAM

## 2023-07-27 PROCEDURE — 85025 COMPLETE CBC W/AUTO DIFF WBC: CPT

## 2023-07-27 PROCEDURE — 84132 ASSAY OF SERUM POTASSIUM: CPT

## 2023-07-27 PROCEDURE — 6360000002 HC RX W HCPCS: Performed by: STUDENT IN AN ORGANIZED HEALTH CARE EDUCATION/TRAINING PROGRAM

## 2023-07-27 PROCEDURE — 82962 GLUCOSE BLOOD TEST: CPT

## 2023-07-27 PROCEDURE — 82947 ASSAY GLUCOSE BLOOD QUANT: CPT

## 2023-07-27 PROCEDURE — 36415 COLL VENOUS BLD VENIPUNCTURE: CPT

## 2023-07-27 PROCEDURE — 83930 ASSAY OF BLOOD OSMOLALITY: CPT

## 2023-07-27 PROCEDURE — 80053 COMPREHEN METABOLIC PANEL: CPT

## 2023-07-27 PROCEDURE — 99285 EMERGENCY DEPT VISIT HI MDM: CPT

## 2023-07-27 PROCEDURE — 82330 ASSAY OF CALCIUM: CPT

## 2023-07-27 PROCEDURE — 1100000003 HC PRIVATE W/ TELEMETRY

## 2023-07-27 RX ORDER — ACETAMINOPHEN 325 MG/1
650 TABLET ORAL EVERY 6 HOURS PRN
Status: DISCONTINUED | OUTPATIENT
Start: 2023-07-27 | End: 2023-07-31 | Stop reason: HOSPADM

## 2023-07-27 RX ORDER — ACETAMINOPHEN 650 MG/1
650 SUPPOSITORY RECTAL EVERY 6 HOURS PRN
Status: DISCONTINUED | OUTPATIENT
Start: 2023-07-27 | End: 2023-07-31 | Stop reason: HOSPADM

## 2023-07-27 RX ORDER — POLYETHYLENE GLYCOL 3350 17 G/17G
17 POWDER, FOR SOLUTION ORAL DAILY PRN
Status: DISCONTINUED | OUTPATIENT
Start: 2023-07-27 | End: 2023-07-31 | Stop reason: HOSPADM

## 2023-07-27 RX ORDER — MORPHINE SULFATE 4 MG/ML
4 INJECTION INTRAVENOUS
Status: COMPLETED | OUTPATIENT
Start: 2023-07-27 | End: 2023-07-27

## 2023-07-27 RX ORDER — HYDROMORPHONE HYDROCHLORIDE 1 MG/ML
0.5 INJECTION, SOLUTION INTRAMUSCULAR; INTRAVENOUS; SUBCUTANEOUS
Status: COMPLETED | OUTPATIENT
Start: 2023-07-27 | End: 2023-07-27

## 2023-07-27 RX ORDER — INSULIN LISPRO 100 [IU]/ML
0-4 INJECTION, SOLUTION INTRAVENOUS; SUBCUTANEOUS NIGHTLY
Status: DISCONTINUED | OUTPATIENT
Start: 2023-07-27 | End: 2023-07-31 | Stop reason: HOSPADM

## 2023-07-27 RX ORDER — SODIUM CHLORIDE 0.9 % (FLUSH) 0.9 %
5-40 SYRINGE (ML) INJECTION EVERY 12 HOURS SCHEDULED
Status: DISCONTINUED | OUTPATIENT
Start: 2023-07-27 | End: 2023-07-31 | Stop reason: HOSPADM

## 2023-07-27 RX ORDER — INSULIN LISPRO 100 [IU]/ML
0-4 INJECTION, SOLUTION INTRAVENOUS; SUBCUTANEOUS
Status: DISCONTINUED | OUTPATIENT
Start: 2023-07-28 | End: 2023-07-31 | Stop reason: HOSPADM

## 2023-07-27 RX ORDER — DEXTROSE MONOHYDRATE 100 MG/ML
INJECTION, SOLUTION INTRAVENOUS CONTINUOUS PRN
Status: DISCONTINUED | OUTPATIENT
Start: 2023-07-27 | End: 2023-07-31 | Stop reason: HOSPADM

## 2023-07-27 RX ORDER — SODIUM CHLORIDE 9 MG/ML
INJECTION, SOLUTION INTRAVENOUS PRN
Status: DISCONTINUED | OUTPATIENT
Start: 2023-07-27 | End: 2023-07-31 | Stop reason: HOSPADM

## 2023-07-27 RX ORDER — SODIUM CHLORIDE 0.9 % (FLUSH) 0.9 %
5-40 SYRINGE (ML) INJECTION PRN
Status: DISCONTINUED | OUTPATIENT
Start: 2023-07-27 | End: 2023-07-31 | Stop reason: HOSPADM

## 2023-07-27 RX ORDER — BUMETANIDE 0.25 MG/ML
1 INJECTION INTRAMUSCULAR; INTRAVENOUS 2 TIMES DAILY
Status: DISCONTINUED | OUTPATIENT
Start: 2023-07-27 | End: 2023-07-29

## 2023-07-27 RX ORDER — ENOXAPARIN SODIUM 100 MG/ML
40 INJECTION SUBCUTANEOUS DAILY
Status: DISCONTINUED | OUTPATIENT
Start: 2023-07-27 | End: 2023-07-31 | Stop reason: HOSPADM

## 2023-07-27 RX ADMIN — MORPHINE SULFATE 4 MG: 4 INJECTION INTRAVENOUS at 20:21

## 2023-07-27 RX ADMIN — HYDROMORPHONE HYDROCHLORIDE 0.5 MG: 1 INJECTION, SOLUTION INTRAMUSCULAR; INTRAVENOUS; SUBCUTANEOUS at 16:44

## 2023-07-27 RX ADMIN — ENOXAPARIN SODIUM 40 MG: 100 INJECTION SUBCUTANEOUS at 22:18

## 2023-07-27 RX ADMIN — BUMETANIDE 1 MG: 0.25 INJECTION INTRAMUSCULAR; INTRAVENOUS at 22:18

## 2023-07-27 RX ADMIN — SODIUM CHLORIDE, PRESERVATIVE FREE 10 ML: 5 INJECTION INTRAVENOUS at 22:19

## 2023-07-27 ASSESSMENT — PAIN DESCRIPTION - LOCATION: LOCATION: LEG

## 2023-07-27 ASSESSMENT — PAIN DESCRIPTION - ORIENTATION: ORIENTATION: LEFT;RIGHT

## 2023-07-27 ASSESSMENT — PAIN SCALES - GENERAL: PAINLEVEL_OUTOF10: 8

## 2023-07-27 ASSESSMENT — PAIN - FUNCTIONAL ASSESSMENT: PAIN_FUNCTIONAL_ASSESSMENT: 0-10

## 2023-07-27 NOTE — ED PROVIDER NOTES
nicotine 14 MG/24HR  Commonly known as: NICODERM CQ  Place 1 patch onto the skin daily     oxyCODONE 5 MG immediate release tablet  Commonly known as: ROXICODONE     rosuvastatin 40 MG tablet  Commonly known as: CRESTOR            2. No follow-up provider specified.   Return to ED if worse     Diagnosis     Clinical Impression: Acute leg cramps, acute on chronic hyponatremia               Tor Bloom MD  07/27/23 2665

## 2023-07-28 ENCOUNTER — TELEPHONE (OUTPATIENT)
Age: 53
End: 2023-07-28

## 2023-07-28 LAB
ALBUMIN SERPL-MCNC: 3.1 G/DL (ref 3.5–5)
ALBUMIN/GLOB SERPL: 0.9 (ref 1.1–2.2)
ALP SERPL-CCNC: 83 U/L (ref 45–117)
ALT SERPL-CCNC: 27 U/L (ref 12–78)
ANION GAP SERPL CALC-SCNC: 3 MMOL/L (ref 5–15)
ANION GAP SERPL CALC-SCNC: 5 MMOL/L (ref 5–15)
ANION GAP SERPL CALC-SCNC: 5 MMOL/L (ref 5–15)
ANION GAP SERPL CALC-SCNC: 9 MMOL/L (ref 5–15)
AST SERPL-CCNC: 42 U/L (ref 15–37)
BASOPHILS # BLD: 0 K/UL (ref 0–0.1)
BASOPHILS NFR BLD: 0 % (ref 0–1)
BILIRUB SERPL-MCNC: 0.5 MG/DL (ref 0.2–1)
BUN SERPL-MCNC: 17 MG/DL (ref 6–20)
BUN SERPL-MCNC: 18 MG/DL (ref 6–20)
BUN SERPL-MCNC: 18 MG/DL (ref 6–20)
BUN SERPL-MCNC: 19 MG/DL (ref 6–20)
BUN/CREAT SERPL: 17 (ref 12–20)
BUN/CREAT SERPL: 17 (ref 12–20)
BUN/CREAT SERPL: 18 (ref 12–20)
BUN/CREAT SERPL: 19 (ref 12–20)
CALCIUM SERPL-MCNC: 7.9 MG/DL (ref 8.5–10.1)
CALCIUM SERPL-MCNC: 8 MG/DL (ref 8.5–10.1)
CALCIUM SERPL-MCNC: 8.1 MG/DL (ref 8.5–10.1)
CALCIUM SERPL-MCNC: 8.2 MG/DL (ref 8.5–10.1)
CHLORIDE SERPL-SCNC: 88 MMOL/L (ref 97–108)
CHLORIDE SERPL-SCNC: 89 MMOL/L (ref 97–108)
CHLORIDE SERPL-SCNC: 89 MMOL/L (ref 97–108)
CHLORIDE SERPL-SCNC: 92 MMOL/L (ref 97–108)
CO2 SERPL-SCNC: 31 MMOL/L (ref 21–32)
CO2 SERPL-SCNC: 32 MMOL/L (ref 21–32)
CREAT SERPL-MCNC: 0.97 MG/DL (ref 0.55–1.02)
CREAT SERPL-MCNC: 1.03 MG/DL (ref 0.55–1.02)
DIFFERENTIAL METHOD BLD: ABNORMAL
EKG ATRIAL RATE: 82 BPM
EKG DIAGNOSIS: NORMAL
EKG P AXIS: 102 DEGREES
EKG P-R INTERVAL: 140 MS
EKG Q-T INTERVAL: 384 MS
EKG QRS DURATION: 76 MS
EKG QTC CALCULATION (BAZETT): 451 MS
EKG R AXIS: -21 DEGREES
EKG T AXIS: 33 DEGREES
EKG VENTRICULAR RATE: 83 BPM
EOSINOPHIL # BLD: 0.1 K/UL (ref 0–0.4)
EOSINOPHIL NFR BLD: 1 % (ref 0–7)
ERYTHROCYTE [DISTWIDTH] IN BLOOD BY AUTOMATED COUNT: 25.7 % (ref 11.5–14.5)
EST. AVERAGE GLUCOSE BLD GHB EST-MCNC: 194 MG/DL
GLOBULIN SER CALC-MCNC: 3.6 G/DL (ref 2–4)
GLUCOSE BLD STRIP.AUTO-MCNC: 132 MG/DL (ref 65–117)
GLUCOSE BLD STRIP.AUTO-MCNC: 255 MG/DL (ref 65–117)
GLUCOSE BLD STRIP.AUTO-MCNC: 330 MG/DL (ref 65–117)
GLUCOSE BLD STRIP.AUTO-MCNC: 414 MG/DL (ref 65–117)
GLUCOSE SERPL-MCNC: 107 MG/DL (ref 65–100)
GLUCOSE SERPL-MCNC: 227 MG/DL (ref 65–100)
GLUCOSE SERPL-MCNC: 295 MG/DL (ref 65–100)
GLUCOSE SERPL-MCNC: 404 MG/DL (ref 65–100)
HBA1C MFR BLD: 8.4 % (ref 4–5.6)
HCT VFR BLD AUTO: 29.2 % (ref 35–47)
HGB BLD-MCNC: 8.9 G/DL (ref 11.5–16)
IMM GRANULOCYTES # BLD AUTO: 0 K/UL (ref 0–0.04)
IMM GRANULOCYTES NFR BLD AUTO: 0 % (ref 0–0.5)
LYMPHOCYTES # BLD: 2.2 K/UL (ref 0.8–3.5)
LYMPHOCYTES NFR BLD: 38 % (ref 12–49)
MCH RBC QN AUTO: 22 PG (ref 26–34)
MCHC RBC AUTO-ENTMCNC: 30.5 G/DL (ref 30–36.5)
MCV RBC AUTO: 72.1 FL (ref 80–99)
MONOCYTES # BLD: 0.6 K/UL (ref 0–1)
MONOCYTES NFR BLD: 11 % (ref 5–13)
NEUTS SEG # BLD: 2.9 K/UL (ref 1.8–8)
NEUTS SEG NFR BLD: 50 % (ref 32–75)
NRBC # BLD: 0 K/UL (ref 0–0.01)
NRBC BLD-RTO: 0 PER 100 WBC
OSMOLALITY SERPL: 273 MOSM/KG H2O
OSMOLALITY SERPL: 283 MOSM/KG H2O
OSMOLALITY UR: 144 MOSM/KG H2O
OSMOLALITY UR: 275 MOSM/KG H2O
PHOSPHATE SERPL-MCNC: 4.9 MG/DL (ref 2.6–4.7)
PLATELET # BLD AUTO: 359 K/UL (ref 150–400)
PMV BLD AUTO: 9.6 FL (ref 8.9–12.9)
POTASSIUM SERPL-SCNC: 3.7 MMOL/L (ref 3.5–5.1)
POTASSIUM SERPL-SCNC: 3.9 MMOL/L (ref 3.5–5.1)
POTASSIUM SERPL-SCNC: 4.6 MMOL/L (ref 3.5–5.1)
POTASSIUM SERPL-SCNC: 5.1 MMOL/L (ref 3.5–5.1)
PROT SERPL-MCNC: 6.7 G/DL (ref 6.4–8.2)
RBC # BLD AUTO: 4.05 M/UL (ref 3.8–5.2)
RBC MORPH BLD: ABNORMAL
RBC MORPH BLD: ABNORMAL
SERVICE CMNT-IMP: ABNORMAL
SODIUM SERPL-SCNC: 125 MMOL/L (ref 136–145)
SODIUM SERPL-SCNC: 125 MMOL/L (ref 136–145)
SODIUM SERPL-SCNC: 126 MMOL/L (ref 136–145)
SODIUM SERPL-SCNC: 129 MMOL/L (ref 136–145)
SODIUM UR-SCNC: 27 MMOL/L
SODIUM UR-SCNC: 9 MMOL/L
WBC # BLD AUTO: 5.8 K/UL (ref 3.6–11)

## 2023-07-28 PROCEDURE — 80053 COMPREHEN METABOLIC PANEL: CPT

## 2023-07-28 PROCEDURE — 36415 COLL VENOUS BLD VENIPUNCTURE: CPT

## 2023-07-28 PROCEDURE — 82962 GLUCOSE BLOOD TEST: CPT

## 2023-07-28 PROCEDURE — 6370000000 HC RX 637 (ALT 250 FOR IP): Performed by: INTERNAL MEDICINE

## 2023-07-28 PROCEDURE — 84300 ASSAY OF URINE SODIUM: CPT

## 2023-07-28 PROCEDURE — 85025 COMPLETE CBC W/AUTO DIFF WBC: CPT

## 2023-07-28 PROCEDURE — 83036 HEMOGLOBIN GLYCOSYLATED A1C: CPT

## 2023-07-28 PROCEDURE — 2700000000 HC OXYGEN THERAPY PER DAY

## 2023-07-28 PROCEDURE — 2580000003 HC RX 258: Performed by: INTERNAL MEDICINE

## 2023-07-28 PROCEDURE — 84100 ASSAY OF PHOSPHORUS: CPT

## 2023-07-28 PROCEDURE — 94760 N-INVAS EAR/PLS OXIMETRY 1: CPT

## 2023-07-28 PROCEDURE — 80048 BASIC METABOLIC PNL TOTAL CA: CPT

## 2023-07-28 PROCEDURE — 83935 ASSAY OF URINE OSMOLALITY: CPT

## 2023-07-28 PROCEDURE — 83930 ASSAY OF BLOOD OSMOLALITY: CPT

## 2023-07-28 PROCEDURE — 1100000003 HC PRIVATE W/ TELEMETRY

## 2023-07-28 PROCEDURE — 6370000000 HC RX 637 (ALT 250 FOR IP)

## 2023-07-28 PROCEDURE — 6360000002 HC RX W HCPCS: Performed by: INTERNAL MEDICINE

## 2023-07-28 RX ORDER — PANTOPRAZOLE SODIUM 40 MG/1
40 TABLET, DELAYED RELEASE ORAL
Status: DISCONTINUED | OUTPATIENT
Start: 2023-07-29 | End: 2023-07-31 | Stop reason: HOSPADM

## 2023-07-28 RX ORDER — CITALOPRAM 20 MG/1
40 TABLET ORAL DAILY
Status: DISCONTINUED | OUTPATIENT
Start: 2023-07-29 | End: 2023-07-31 | Stop reason: HOSPADM

## 2023-07-28 RX ORDER — BUSPIRONE HYDROCHLORIDE 5 MG/1
15 TABLET ORAL 2 TIMES DAILY
Status: DISCONTINUED | OUTPATIENT
Start: 2023-07-28 | End: 2023-07-28

## 2023-07-28 RX ORDER — ESOMEPRAZOLE MAGNESIUM 40 MG/1
40 FOR SUSPENSION ORAL DAILY
Status: DISCONTINUED | OUTPATIENT
Start: 2023-07-28 | End: 2023-07-28

## 2023-07-28 RX ORDER — NALOXONE HYDROCHLORIDE 4 MG/.1ML
1 SPRAY NASAL PRN
Status: DISCONTINUED | OUTPATIENT
Start: 2023-07-28 | End: 2023-07-28

## 2023-07-28 RX ORDER — ARIPIPRAZOLE 5 MG/1
30 TABLET ORAL DAILY
Status: DISCONTINUED | OUTPATIENT
Start: 2023-07-28 | End: 2023-07-31 | Stop reason: HOSPADM

## 2023-07-28 RX ORDER — ALPRAZOLAM 0.5 MG/1
2 TABLET ORAL 3 TIMES DAILY PRN
Status: DISCONTINUED | OUTPATIENT
Start: 2023-07-28 | End: 2023-07-31 | Stop reason: HOSPADM

## 2023-07-28 RX ORDER — BUTALBITAL, ACETAMINOPHEN AND CAFFEINE 50; 325; 40 MG/1; MG/1; MG/1
1 TABLET ORAL EVERY 6 HOURS PRN
Status: DISCONTINUED | OUTPATIENT
Start: 2023-07-28 | End: 2023-07-31 | Stop reason: HOSPADM

## 2023-07-28 RX ORDER — INSULIN GLARGINE 100 [IU]/ML
20 INJECTION, SOLUTION SUBCUTANEOUS NIGHTLY
Status: DISCONTINUED | OUTPATIENT
Start: 2023-07-28 | End: 2023-07-29

## 2023-07-28 RX ORDER — FAMOTIDINE 20 MG/1
40 TABLET, FILM COATED ORAL DAILY
Status: DISCONTINUED | OUTPATIENT
Start: 2023-07-29 | End: 2023-07-31 | Stop reason: HOSPADM

## 2023-07-28 RX ORDER — MORPHINE SULFATE 30 MG/1
90 TABLET, FILM COATED, EXTENDED RELEASE ORAL 2 TIMES DAILY
Status: DISCONTINUED | OUTPATIENT
Start: 2023-07-28 | End: 2023-07-31 | Stop reason: HOSPADM

## 2023-07-28 RX ORDER — SODIUM CHLORIDE 9 MG/ML
INJECTION, SOLUTION INTRAVENOUS CONTINUOUS
Status: DISCONTINUED | OUTPATIENT
Start: 2023-07-28 | End: 2023-07-28

## 2023-07-28 RX ORDER — NICOTINE 21 MG/24HR
1 PATCH, TRANSDERMAL 24 HOURS TRANSDERMAL DAILY
Status: DISCONTINUED | OUTPATIENT
Start: 2023-07-29 | End: 2023-07-31 | Stop reason: HOSPADM

## 2023-07-28 RX ORDER — 0.9 % SODIUM CHLORIDE 0.9 %
1000 INTRAVENOUS SOLUTION INTRAVENOUS ONCE
Status: COMPLETED | OUTPATIENT
Start: 2023-07-28 | End: 2023-07-28

## 2023-07-28 RX ORDER — ALPRAZOLAM 0.5 MG/1
2 TABLET ORAL ONCE
Status: COMPLETED | OUTPATIENT
Start: 2023-07-28 | End: 2023-07-28

## 2023-07-28 RX ORDER — ASPIRIN 81 MG/1
81 TABLET ORAL DAILY
Status: DISCONTINUED | OUTPATIENT
Start: 2023-07-28 | End: 2023-07-31 | Stop reason: HOSPADM

## 2023-07-28 RX ORDER — NALOXONE HYDROCHLORIDE 0.4 MG/ML
0.4 INJECTION, SOLUTION INTRAMUSCULAR; INTRAVENOUS; SUBCUTANEOUS PRN
Status: DISCONTINUED | OUTPATIENT
Start: 2023-07-28 | End: 2023-07-31 | Stop reason: HOSPADM

## 2023-07-28 RX ORDER — INSULIN GLARGINE 100 [IU]/ML
10 INJECTION, SOLUTION SUBCUTANEOUS NIGHTLY
Status: DISCONTINUED | OUTPATIENT
Start: 2023-07-28 | End: 2023-07-28

## 2023-07-28 RX ORDER — POTASSIUM CHLORIDE 20 MEQ/1
40 TABLET, EXTENDED RELEASE ORAL ONCE
Status: COMPLETED | OUTPATIENT
Start: 2023-07-28 | End: 2023-07-28

## 2023-07-28 RX ORDER — GABAPENTIN 300 MG/1
300 CAPSULE ORAL 3 TIMES DAILY
Status: DISCONTINUED | OUTPATIENT
Start: 2023-07-28 | End: 2023-07-31 | Stop reason: HOSPADM

## 2023-07-28 RX ORDER — CASTOR OIL AND BALSAM, PERU 788; 87 MG/G; MG/G
OINTMENT TOPICAL 2 TIMES DAILY
Status: DISCONTINUED | OUTPATIENT
Start: 2023-07-28 | End: 2023-07-31 | Stop reason: HOSPADM

## 2023-07-28 RX ORDER — ALBUTEROL SULFATE 90 UG/1
2 AEROSOL, METERED RESPIRATORY (INHALATION) EVERY 6 HOURS PRN
Status: DISCONTINUED | OUTPATIENT
Start: 2023-07-28 | End: 2023-07-28

## 2023-07-28 RX ORDER — ROSUVASTATIN CALCIUM 40 MG/1
40 TABLET, COATED ORAL DAILY
Status: DISCONTINUED | OUTPATIENT
Start: 2023-07-29 | End: 2023-07-31 | Stop reason: HOSPADM

## 2023-07-28 RX ORDER — BUSPIRONE HYDROCHLORIDE 5 MG/1
15 TABLET ORAL 3 TIMES DAILY
Status: DISCONTINUED | OUTPATIENT
Start: 2023-07-28 | End: 2023-07-31 | Stop reason: HOSPADM

## 2023-07-28 RX ORDER — ALBUTEROL SULFATE 2.5 MG/3ML
2.5 SOLUTION RESPIRATORY (INHALATION) EVERY 6 HOURS PRN
Status: DISCONTINUED | OUTPATIENT
Start: 2023-07-28 | End: 2023-07-31 | Stop reason: HOSPADM

## 2023-07-28 RX ADMIN — ENOXAPARIN SODIUM 40 MG: 100 INJECTION SUBCUTANEOUS at 08:35

## 2023-07-28 RX ADMIN — ALPRAZOLAM 2 MG: 0.5 TABLET ORAL at 15:03

## 2023-07-28 RX ADMIN — ALPRAZOLAM 2 MG: 0.5 TABLET ORAL at 21:33

## 2023-07-28 RX ADMIN — ARIPIPRAZOLE 30 MG: 5 TABLET ORAL at 17:02

## 2023-07-28 RX ADMIN — ALPRAZOLAM 2 MG: 0.5 TABLET ORAL at 01:27

## 2023-07-28 RX ADMIN — SODIUM CHLORIDE 1000 ML: 9 INJECTION, SOLUTION INTRAVENOUS at 09:34

## 2023-07-28 RX ADMIN — Medication: at 21:36

## 2023-07-28 RX ADMIN — Medication 4 UNITS: at 20:45

## 2023-07-28 RX ADMIN — Medication 2 UNITS: at 11:54

## 2023-07-28 RX ADMIN — MORPHINE SULFATE 90 MG: 30 TABLET, FILM COATED, EXTENDED RELEASE ORAL at 20:45

## 2023-07-28 RX ADMIN — ASPIRIN 81 MG: 81 TABLET, COATED ORAL at 15:04

## 2023-07-28 RX ADMIN — BUSPIRONE HYDROCHLORIDE 15 MG: 5 TABLET ORAL at 20:45

## 2023-07-28 RX ADMIN — SODIUM CHLORIDE, PRESERVATIVE FREE 10 ML: 5 INJECTION INTRAVENOUS at 08:36

## 2023-07-28 RX ADMIN — ACETAMINOPHEN 650 MG: 325 TABLET ORAL at 06:26

## 2023-07-28 RX ADMIN — GABAPENTIN 300 MG: 300 CAPSULE ORAL at 20:45

## 2023-07-28 RX ADMIN — INSULIN GLARGINE 20 UNITS: 100 INJECTION, SOLUTION SUBCUTANEOUS at 20:44

## 2023-07-28 RX ADMIN — Medication 3 UNITS: at 17:01

## 2023-07-28 RX ADMIN — POTASSIUM CHLORIDE 40 MEQ: 1500 TABLET, EXTENDED RELEASE ORAL at 09:34

## 2023-07-28 RX ADMIN — SODIUM CHLORIDE, PRESERVATIVE FREE 10 ML: 5 INJECTION INTRAVENOUS at 20:47

## 2023-07-28 RX ADMIN — Medication: at 12:51

## 2023-07-28 ASSESSMENT — PAIN DESCRIPTION - ORIENTATION
ORIENTATION: ANTERIOR
ORIENTATION: LOWER

## 2023-07-28 ASSESSMENT — PAIN DESCRIPTION - LOCATION
LOCATION: HEAD
LOCATION: ABDOMEN

## 2023-07-28 ASSESSMENT — PAIN SCALES - GENERAL
PAINLEVEL_OUTOF10: 8
PAINLEVEL_OUTOF10: 5

## 2023-07-28 ASSESSMENT — PAIN DESCRIPTION - DESCRIPTORS: DESCRIPTORS: ACHING

## 2023-07-28 NOTE — H&P
Result Value Ref Range    Ventricular Rate 83 BPM    Atrial Rate 82 BPM    P-R Interval 140 ms    QRS Duration 76 ms    Q-T Interval 384 ms    QTc Calculation (Bazett) 451 ms    P Axis 102 degrees    R Axis -21 degrees    T Axis 33 degrees    Diagnosis       Undetermined rhythm  Low voltage QRS  Septal infarct (cited on or before 11-JUL-2023)  When compared with ECG of 11-JUL-2023 05:53,  Current undetermined rhythm precludes rhythm comparison, needs review  Questionable change in initial forces of Septal leads  ST now depressed in Inferior leads     Urinalysis with Reflex to Culture    Collection Time: 07/27/23  6:08 PM    Specimen: Urine   Result Value Ref Range    Color, UA YELLOW/STRAW      Appearance CLEAR CLEAR      Specific Gravity, UA <1.005     pH, Urine 7.0 5.0 - 8.0      Protein, UA Negative NEG mg/dL    Glucose, UA Negative NEG mg/dL    Ketones, Urine Negative NEG mg/dL    Bilirubin Urine Negative NEG      Blood, Urine Negative NEG      Urobilinogen, Urine 0.2 0.2 - 1.0 EU/dL    Nitrite, Urine Negative NEG      Leukocyte Esterase, Urine TRACE (A) NEG      Urine Culture if Indicated CULTURE NOT INDICATED BY UA RESULT CNI      WBC, UA 0-4 0 - 4 /hpf    RBC, UA 0-5 0 - 5 /hpf    Epithelial Cells UA FEW FEW /lpf    BACTERIA, URINE Negative NEG /hpf    Hyaline Casts, UA 0-2 0 - 2 /lpf         CT Result (most recent):  CT HEAD WO CONTRAST 04/25/2023    Narrative  CLINICAL HISTORY: ams  INDICATION: ams  COMPARISON: 2018. CT dose reduction was achieved through use of a standardized protocol tailored  for this examination and automatic exposure control for dose modulation. TECHNIQUE: Serial axial images with a collimation of 5 mm were obtained from the  skull base through the vertex  FINDINGS:  The sulci and ventricles are within normal limits for patient age. There is no  evidence of an acute infarction, hemorrhage, or mass-effect. There is no  evidence of midline shift or hydrocephalus.  Posterior fossa

## 2023-07-28 NOTE — CARE COORDINATION
RUR: 26%  Readmission? Yes -7/26/23  1st IM letter given? Yes - 7/28/23  1st  letter given: No              07/28/23 6038   Service Assessment   Patient Orientation Alert and Oriented   Cognition Alert   History Provided By Patient   Primary Caregiver Self   Support Systems Spouse/Significant Other;Friends/Neighbors; Family Members   Patient's [address] is: Named in [address]   PCP Verified by CM Yes   Last Visit to PCP Within last 3 months   Prior Functional Level Independent in ADLs/IADLs   Current Functional Level Independent in ADLs/IADLs   Can patient return to prior living arrangement Yes   Family able to assist with home care needs: Yes   Would you like for me to discuss the discharge plan with any other family members/significant others, and if so, who? Yes  (Pt's significant other-Duke Muniz if needed)   Financial Resources Medicare   Community Resources None   Social/Functional History   Lives With Significant other   Type of Home House   Home Equipment Oxygen; Walker, rolling   Active  No   Patient's  Info Pt's family transport   Discharge Planning   Type of [address] Prior To Admission Oxygen Therapy   [facility]/

## 2023-07-28 NOTE — TELEPHONE ENCOUNTER
I reviewed the chart this evening to see that lantus had been ordered, albeit 10U, with 37U being regular dose. Send note to primary team she is type 1 not 2 and that she needs more basal than 10U.     Skyler Loving, [address], [facility]

## 2023-07-28 NOTE — TELEPHONE ENCOUNTER
Informed pt that Dr Dustin Huber is currently out of the office until Monday 07/31/2023. Pt verbalized understanding and stated she wanted to let him know that her sodium was 125 and she may still be in the hospital on Monday.

## 2023-07-28 NOTE — TELEPHONE ENCOUNTER
7/28/2023  2:30 PM    Pt called and stated she is being seen at [facility] for Low Sodium. She wanted to let Dr. Aimee Umanzor know the hospital provider/nurse did not give her the lantus yet and she takes it every morning. Pt can be reached at [phone].     Thanks,   Laverne Peoples

## 2023-07-28 NOTE — PLAN OF CARE
Problem: Discharge Planning  Goal: Discharge to home or other facility with appropriate resources  Outcome: Progressing  Flowsheets (Taken 7/27/2023 2331 by Mira Pruitt RN)  Discharge to home or other facility with appropriate resources: Identify barriers to discharge with patient and caregiver     Problem: Pain  Goal: Verbalizes/displays adequate comfort level or baseline comfort level  Outcome: Progressing     Problem: Safety - Adult  Goal: Free from fall injury  Outcome: Progressing

## 2023-07-28 NOTE — CARE COORDINATION
Care Management Initial Assessment       RUR: 26%  Readmission? Yes -7/26/23  1st IM letter given? Yes - 7/28/23  1st  letter given: No     07/28/23 1042   Readmission Assessment   Number of Days since last admission? 8-30 days   Previous Disposition Home with Family   Who is being Interviewed Patient   What was the patient's/caregiver's perception as to why they think they needed to return back to the hospital? Other (Comment)  (Low Sodium)   Did you visit your Primary Care Physician after you left the hospital, before you returned this time? Yes   Did you see a specialist, such as Cardiac, Pulmonary, Orthopedic Physician, etc. after you left the hospital? Yes   Who advised the patient to return to the hospital? Physician   Does the patient report anything that got in the way of taking their medications? No   In our efforts to provide the best possible care to you and others like you, can you think of anything that we could have done to help you after you left the hospital the first time, so that you might not have needed to return so soon? Other (Comment)  (Pt could not think of anything that we could have done to help after she left the hospital the first time)     CM introduce self, explain role and confirm demographics with pt. Pt was d/c from [facility] on 7/14/2023. Pt lives with her significant other in a one story home with no steps to enter. Pt is on home oxygen at 3L and has a rolling walker. Pt uses [employer] on [address]. At the time of d/c pt's significant other will transport. CM will follow and assist with d/c planning.      Addie Jo  /

## 2023-07-28 NOTE — WOUND CARE
Wound care consult for the \"sacrum wound and the left heel wound\" present on admission. Chart reviewed and patient assessed. Pt. Is sitting up in the chair and she is alert. Stated, \"My sodium is all messed up\"   Assessment: pt. Was able to stand and her sacrum was examined and found to have no open wounds at this time. She has some scarring from old pressure injuries she had when she was in another hospital a few years ago. The left heel is a scabbed area of her skin from where she scratched her skin and made it bleed and now it is healing well and dry. No pressure injuries noted. Recommend off loading heels as patient does have decreased sensation in the feet. Pt. Junius Pickup herself when in the bed but rarely wants to sit in the bed. Staff to remind her to stand every two hours. Venelex per protocol already ordered. Continue as ordered.    Shefali Ernandez RN, BSN, [employer]

## 2023-07-29 ENCOUNTER — APPOINTMENT (OUTPATIENT)
Facility: HOSPITAL | Age: 53
DRG: 641 | End: 2023-07-29
Payer: MEDICARE

## 2023-07-29 LAB
ALBUMIN SERPL-MCNC: 2.7 G/DL (ref 3.5–5)
ANION GAP SERPL CALC-SCNC: 4 MMOL/L (ref 5–15)
ANION GAP SERPL CALC-SCNC: 5 MMOL/L (ref 5–15)
BUN SERPL-MCNC: 16 MG/DL (ref 6–20)
BUN SERPL-MCNC: 18 MG/DL (ref 6–20)
BUN/CREAT SERPL: 14 (ref 12–20)
BUN/CREAT SERPL: 19 (ref 12–20)
CALCIUM SERPL-MCNC: 8 MG/DL (ref 8.5–10.1)
CALCIUM SERPL-MCNC: 8 MG/DL (ref 8.5–10.1)
CHLORIDE SERPL-SCNC: 93 MMOL/L (ref 97–108)
CHLORIDE SERPL-SCNC: 94 MMOL/L (ref 97–108)
CO2 SERPL-SCNC: 30 MMOL/L (ref 21–32)
CO2 SERPL-SCNC: 31 MMOL/L (ref 21–32)
COMMENT:: NORMAL
CREAT SERPL-MCNC: 0.93 MG/DL (ref 0.55–1.02)
CREAT SERPL-MCNC: 1.18 MG/DL (ref 0.55–1.02)
ERYTHROCYTE [DISTWIDTH] IN BLOOD BY AUTOMATED COUNT: 25.3 % (ref 11.5–14.5)
GLUCOSE BLD STRIP.AUTO-MCNC: 142 MG/DL (ref 65–117)
GLUCOSE BLD STRIP.AUTO-MCNC: 173 MG/DL (ref 65–117)
GLUCOSE BLD STRIP.AUTO-MCNC: 264 MG/DL (ref 65–117)
GLUCOSE BLD STRIP.AUTO-MCNC: 266 MG/DL (ref 65–117)
GLUCOSE BLD STRIP.AUTO-MCNC: 408 MG/DL (ref 65–117)
GLUCOSE BLD STRIP.AUTO-MCNC: 411 MG/DL (ref 65–117)
GLUCOSE BLD STRIP.AUTO-MCNC: 452 MG/DL (ref 65–117)
GLUCOSE SERPL-MCNC: 264 MG/DL (ref 65–100)
GLUCOSE SERPL-MCNC: 367 MG/DL (ref 65–100)
HCT VFR BLD AUTO: 26.8 % (ref 35–47)
HGB BLD-MCNC: 8 G/DL (ref 11.5–16)
MAGNESIUM SERPL-MCNC: 2 MG/DL (ref 1.6–2.4)
MCH RBC QN AUTO: 21.7 PG (ref 26–34)
MCHC RBC AUTO-ENTMCNC: 29.9 G/DL (ref 30–36.5)
MCV RBC AUTO: 72.6 FL (ref 80–99)
NRBC # BLD: 0 K/UL (ref 0–0.01)
NRBC BLD-RTO: 0 PER 100 WBC
PHOSPHATE SERPL-MCNC: 4 MG/DL (ref 2.6–4.7)
PLATELET # BLD AUTO: 336 K/UL (ref 150–400)
PMV BLD AUTO: 9.5 FL (ref 8.9–12.9)
POTASSIUM SERPL-SCNC: 4.3 MMOL/L (ref 3.5–5.1)
POTASSIUM SERPL-SCNC: 4.4 MMOL/L (ref 3.5–5.1)
RBC # BLD AUTO: 3.69 M/UL (ref 3.8–5.2)
SERVICE CMNT-IMP: ABNORMAL
SODIUM SERPL-SCNC: 128 MMOL/L (ref 136–145)
SODIUM SERPL-SCNC: 129 MMOL/L (ref 136–145)
SPECIMEN HOLD: NORMAL
TSH SERPL DL<=0.05 MIU/L-ACNC: 1.66 UIU/ML (ref 0.36–3.74)
WBC # BLD AUTO: 4.3 K/UL (ref 3.6–11)

## 2023-07-29 PROCEDURE — 82962 GLUCOSE BLOOD TEST: CPT

## 2023-07-29 PROCEDURE — 76937 US GUIDE VASCULAR ACCESS: CPT

## 2023-07-29 PROCEDURE — 80048 BASIC METABOLIC PNL TOTAL CA: CPT

## 2023-07-29 PROCEDURE — 2580000003 HC RX 258: Performed by: INTERNAL MEDICINE

## 2023-07-29 PROCEDURE — 84443 ASSAY THYROID STIM HORMONE: CPT

## 2023-07-29 PROCEDURE — 94760 N-INVAS EAR/PLS OXIMETRY 1: CPT

## 2023-07-29 PROCEDURE — 6370000000 HC RX 637 (ALT 250 FOR IP): Performed by: INTERNAL MEDICINE

## 2023-07-29 PROCEDURE — 36415 COLL VENOUS BLD VENIPUNCTURE: CPT

## 2023-07-29 PROCEDURE — 83735 ASSAY OF MAGNESIUM: CPT

## 2023-07-29 PROCEDURE — 99222 1ST HOSP IP/OBS MODERATE 55: CPT | Performed by: INTERNAL MEDICINE

## 2023-07-29 PROCEDURE — 1100000003 HC PRIVATE W/ TELEMETRY

## 2023-07-29 PROCEDURE — 85027 COMPLETE CBC AUTOMATED: CPT

## 2023-07-29 PROCEDURE — 70551 MRI BRAIN STEM W/O DYE: CPT

## 2023-07-29 PROCEDURE — 80069 RENAL FUNCTION PANEL: CPT

## 2023-07-29 PROCEDURE — 6360000002 HC RX W HCPCS: Performed by: INTERNAL MEDICINE

## 2023-07-29 PROCEDURE — 2500000003 HC RX 250 WO HCPCS: Performed by: INTERNAL MEDICINE

## 2023-07-29 PROCEDURE — 2700000000 HC OXYGEN THERAPY PER DAY

## 2023-07-29 RX ORDER — BUMETANIDE 0.25 MG/ML
0.5 INJECTION INTRAMUSCULAR; INTRAVENOUS EVERY 12 HOURS
Status: DISCONTINUED | OUTPATIENT
Start: 2023-07-29 | End: 2023-07-31 | Stop reason: HOSPADM

## 2023-07-29 RX ORDER — INSULIN GLARGINE 100 [IU]/ML
10 INJECTION, SOLUTION SUBCUTANEOUS ONCE
Status: COMPLETED | OUTPATIENT
Start: 2023-07-29 | End: 2023-07-29

## 2023-07-29 RX ORDER — OXYCODONE HYDROCHLORIDE 5 MG/1
5 TABLET ORAL EVERY 4 HOURS PRN
Status: DISCONTINUED | OUTPATIENT
Start: 2023-07-29 | End: 2023-07-31 | Stop reason: HOSPADM

## 2023-07-29 RX ORDER — INSULIN LISPRO 100 [IU]/ML
10 INJECTION, SOLUTION INTRAVENOUS; SUBCUTANEOUS
Status: DISCONTINUED | OUTPATIENT
Start: 2023-07-29 | End: 2023-07-31 | Stop reason: HOSPADM

## 2023-07-29 RX ORDER — INSULIN GLARGINE 100 [IU]/ML
37 INJECTION, SOLUTION SUBCUTANEOUS NIGHTLY
Status: DISCONTINUED | OUTPATIENT
Start: 2023-07-29 | End: 2023-07-31 | Stop reason: HOSPADM

## 2023-07-29 RX ADMIN — BUMETANIDE 0.5 MG: 0.25 INJECTION, SOLUTION INTRAMUSCULAR; INTRAVENOUS at 08:51

## 2023-07-29 RX ADMIN — OXYCODONE HYDROCHLORIDE 5 MG: 5 TABLET ORAL at 18:11

## 2023-07-29 RX ADMIN — GABAPENTIN 300 MG: 300 CAPSULE ORAL at 20:53

## 2023-07-29 RX ADMIN — ASPIRIN 81 MG: 81 TABLET, COATED ORAL at 08:50

## 2023-07-29 RX ADMIN — INSULIN GLARGINE 10 UNITS: 100 INJECTION, SOLUTION SUBCUTANEOUS at 12:49

## 2023-07-29 RX ADMIN — BUSPIRONE HYDROCHLORIDE 15 MG: 5 TABLET ORAL at 20:53

## 2023-07-29 RX ADMIN — MORPHINE SULFATE 90 MG: 30 TABLET, FILM COATED, EXTENDED RELEASE ORAL at 08:50

## 2023-07-29 RX ADMIN — CITALOPRAM HYDROBROMIDE 40 MG: 20 TABLET ORAL at 08:50

## 2023-07-29 RX ADMIN — INSULIN GLARGINE 37 UNITS: 100 INJECTION, SOLUTION SUBCUTANEOUS at 20:53

## 2023-07-29 RX ADMIN — Medication 2 UNITS: at 08:51

## 2023-07-29 RX ADMIN — BUSPIRONE HYDROCHLORIDE 15 MG: 5 TABLET ORAL at 08:50

## 2023-07-29 RX ADMIN — BUMETANIDE 0.5 MG: 0.25 INJECTION, SOLUTION INTRAMUSCULAR; INTRAVENOUS at 20:52

## 2023-07-29 RX ADMIN — SODIUM CHLORIDE, PRESERVATIVE FREE 10 ML: 5 INJECTION INTRAVENOUS at 08:51

## 2023-07-29 RX ADMIN — Medication: at 08:52

## 2023-07-29 RX ADMIN — ALPRAZOLAM 2 MG: 0.5 TABLET ORAL at 16:19

## 2023-07-29 RX ADMIN — ALPRAZOLAM 2 MG: 0.5 TABLET ORAL at 06:43

## 2023-07-29 RX ADMIN — ROSUVASTATIN CALCIUM 40 MG: 40 TABLET, COATED ORAL at 08:51

## 2023-07-29 RX ADMIN — ARIPIPRAZOLE 30 MG: 5 TABLET ORAL at 08:57

## 2023-07-29 RX ADMIN — FAMOTIDINE 40 MG: 20 TABLET, FILM COATED ORAL at 08:51

## 2023-07-29 RX ADMIN — PANTOPRAZOLE SODIUM 40 MG: 40 TABLET, DELAYED RELEASE ORAL at 06:32

## 2023-07-29 RX ADMIN — MORPHINE SULFATE 90 MG: 30 TABLET, FILM COATED, EXTENDED RELEASE ORAL at 20:53

## 2023-07-29 RX ADMIN — GABAPENTIN 300 MG: 300 CAPSULE ORAL at 12:50

## 2023-07-29 RX ADMIN — Medication 10 UNITS: at 17:12

## 2023-07-29 RX ADMIN — Medication 8 UNITS: at 12:48

## 2023-07-29 RX ADMIN — ALPRAZOLAM 2 MG: 0.5 TABLET ORAL at 21:44

## 2023-07-29 RX ADMIN — BUSPIRONE HYDROCHLORIDE 15 MG: 5 TABLET ORAL at 13:00

## 2023-07-29 RX ADMIN — ENOXAPARIN SODIUM 40 MG: 100 INJECTION SUBCUTANEOUS at 08:50

## 2023-07-29 RX ADMIN — Medication: at 20:54

## 2023-07-29 RX ADMIN — GABAPENTIN 300 MG: 300 CAPSULE ORAL at 08:51

## 2023-07-29 RX ADMIN — SODIUM CHLORIDE, PRESERVATIVE FREE 10 ML: 5 INJECTION INTRAVENOUS at 20:55

## 2023-07-29 ASSESSMENT — PAIN SCALES - GENERAL: PAINLEVEL_OUTOF10: 9

## 2023-07-29 ASSESSMENT — PAIN DESCRIPTION - DESCRIPTORS: DESCRIPTORS: CRAMPING

## 2023-07-29 ASSESSMENT — PAIN DESCRIPTION - LOCATION: LOCATION: ABDOMEN

## 2023-07-29 NOTE — CONSULTS
Date of Consultation:  July 29, 2023    Referring Physician: Sammy Moore MD     Reason for Consultation:  tremor     Chief Complaint   Patient presents with    Leg Pain     Bilateral leg pain/cramping starting today; pcp referred for hyponatremia;; no c/o pain, N/V. +dizziness with standing       History of Present Illness:   Sarah Molina is a 47 y.o. female who initially presented yesterday with complaint of leg cramps found to be hyponatremic with sodium of 590, metabolic alkalosis, and PERLA on CKD. Neurology was consulted this morning for patient's tremors. Patient reports that approximately 1 month ago she started having left upper extremity tremors primarily with action and posture not at rest.  She also notices that occasionally in her left leg but she is able to walk and has not had any falls. She denies any numbness or weakness on that side of the body. She also does notice that she has some head and voice tremor as well. She is unsure if the tremors persist during sleep. She denies any slowing of movements or shuffling gait. She denies any rigidity in the left upper extremity. She has never had any history of tremors before in the past.  She feels that in the last month it is slowly worsened. She does not have any tremors in the right side. She has been evaluated by neurology in the past as a consultation for hyperglycemic induced seizures. She is not on any medications for seizures. She is on Abilify for mood disorder. But it appears that this is new since March.      Past Medical History:   Diagnosis Date    Achilles tendon rupture     along with torn right patellar/femoral ligament    Arthritis     rt. foot    Chronic kidney disease     Chronic pain     abdominal pain    Diabetes (HCC)     IDDM on insulin pump and sensor    DM type 1 (diabetes mellitus, type 1) ([address] [facility])     age 25    Endometriosis     s/p ex-lap 4x    Gastric ulcer     GERD (gastroesophageal reflux disease)     Herpes
[id number] [facility]  [address], [address] [facility], [address]  Phone - [phone]        Fax - [phone]

## 2023-07-30 ENCOUNTER — TELEPHONE (OUTPATIENT)
Age: 53
End: 2023-07-30

## 2023-07-30 ENCOUNTER — APPOINTMENT (OUTPATIENT)
Facility: HOSPITAL | Age: 53
DRG: 641 | End: 2023-07-30
Payer: MEDICARE

## 2023-07-30 LAB
ANION GAP SERPL CALC-SCNC: 6 MMOL/L (ref 5–15)
BUN SERPL-MCNC: 20 MG/DL (ref 6–20)
BUN/CREAT SERPL: 19 (ref 12–20)
CALCIUM SERPL-MCNC: 8.5 MG/DL (ref 8.5–10.1)
CHLORIDE SERPL-SCNC: 94 MMOL/L (ref 97–108)
CO2 SERPL-SCNC: 30 MMOL/L (ref 21–32)
CREAT SERPL-MCNC: 1.03 MG/DL (ref 0.55–1.02)
ERYTHROCYTE [DISTWIDTH] IN BLOOD BY AUTOMATED COUNT: 25.2 % (ref 11.5–14.5)
GLUCOSE BLD STRIP.AUTO-MCNC: 114 MG/DL (ref 65–117)
GLUCOSE BLD STRIP.AUTO-MCNC: 220 MG/DL (ref 65–117)
GLUCOSE BLD STRIP.AUTO-MCNC: 290 MG/DL (ref 65–117)
GLUCOSE BLD STRIP.AUTO-MCNC: 292 MG/DL (ref 65–117)
GLUCOSE BLD STRIP.AUTO-MCNC: 359 MG/DL (ref 65–117)
GLUCOSE SERPL-MCNC: 205 MG/DL (ref 65–100)
HCT VFR BLD AUTO: 26.1 % (ref 35–47)
HGB BLD-MCNC: 7.7 G/DL (ref 11.5–16)
MAGNESIUM SERPL-MCNC: 1.9 MG/DL (ref 1.6–2.4)
MCH RBC QN AUTO: 21.4 PG (ref 26–34)
MCHC RBC AUTO-ENTMCNC: 29.5 G/DL (ref 30–36.5)
MCV RBC AUTO: 72.5 FL (ref 80–99)
NRBC # BLD: 0 K/UL (ref 0–0.01)
NRBC BLD-RTO: 0 PER 100 WBC
PLATELET # BLD AUTO: 345 K/UL (ref 150–400)
PMV BLD AUTO: 9.2 FL (ref 8.9–12.9)
POTASSIUM SERPL-SCNC: 4.3 MMOL/L (ref 3.5–5.1)
RBC # BLD AUTO: 3.6 M/UL (ref 3.8–5.2)
SERVICE CMNT-IMP: ABNORMAL
SERVICE CMNT-IMP: NORMAL
SODIUM SERPL-SCNC: 130 MMOL/L (ref 136–145)
WBC # BLD AUTO: 4.4 K/UL (ref 3.6–11)

## 2023-07-30 PROCEDURE — P9047 ALBUMIN (HUMAN), 25%, 50ML: HCPCS | Performed by: INTERNAL MEDICINE

## 2023-07-30 PROCEDURE — 82962 GLUCOSE BLOOD TEST: CPT

## 2023-07-30 PROCEDURE — 6360000002 HC RX W HCPCS: Performed by: INTERNAL MEDICINE

## 2023-07-30 PROCEDURE — 2580000003 HC RX 258: Performed by: INTERNAL MEDICINE

## 2023-07-30 PROCEDURE — 85027 COMPLETE CBC AUTOMATED: CPT

## 2023-07-30 PROCEDURE — 1100000003 HC PRIVATE W/ TELEMETRY

## 2023-07-30 PROCEDURE — 80048 BASIC METABOLIC PNL TOTAL CA: CPT

## 2023-07-30 PROCEDURE — 2500000003 HC RX 250 WO HCPCS: Performed by: INTERNAL MEDICINE

## 2023-07-30 PROCEDURE — 6370000000 HC RX 637 (ALT 250 FOR IP): Performed by: NURSE PRACTITIONER

## 2023-07-30 PROCEDURE — 76700 US EXAM ABDOM COMPLETE: CPT

## 2023-07-30 PROCEDURE — 2700000000 HC OXYGEN THERAPY PER DAY

## 2023-07-30 PROCEDURE — 36415 COLL VENOUS BLD VENIPUNCTURE: CPT

## 2023-07-30 PROCEDURE — 83735 ASSAY OF MAGNESIUM: CPT

## 2023-07-30 PROCEDURE — 94760 N-INVAS EAR/PLS OXIMETRY 1: CPT

## 2023-07-30 PROCEDURE — 6370000000 HC RX 637 (ALT 250 FOR IP): Performed by: INTERNAL MEDICINE

## 2023-07-30 RX ORDER — ALBUMIN (HUMAN) 12.5 G/50ML
25 SOLUTION INTRAVENOUS ONCE
Status: COMPLETED | OUTPATIENT
Start: 2023-07-30 | End: 2023-07-30

## 2023-07-30 RX ORDER — LANOLIN ALCOHOL/MO/W.PET/CERES
3 CREAM (GRAM) TOPICAL
Status: COMPLETED | OUTPATIENT
Start: 2023-07-30 | End: 2023-07-30

## 2023-07-30 RX ORDER — BUMETANIDE 0.25 MG/ML
2 INJECTION INTRAMUSCULAR; INTRAVENOUS ONCE
Status: COMPLETED | OUTPATIENT
Start: 2023-07-30 | End: 2023-07-30

## 2023-07-30 RX ADMIN — INSULIN GLARGINE 37 UNITS: 100 INJECTION, SOLUTION SUBCUTANEOUS at 21:30

## 2023-07-30 RX ADMIN — SODIUM CHLORIDE, PRESERVATIVE FREE 10 ML: 5 INJECTION INTRAVENOUS at 09:13

## 2023-07-30 RX ADMIN — Medication: at 21:42

## 2023-07-30 RX ADMIN — Medication 10 UNITS: at 12:21

## 2023-07-30 RX ADMIN — PANTOPRAZOLE SODIUM 40 MG: 40 TABLET, DELAYED RELEASE ORAL at 06:31

## 2023-07-30 RX ADMIN — BUMETANIDE 0.5 MG: 0.25 INJECTION, SOLUTION INTRAMUSCULAR; INTRAVENOUS at 21:30

## 2023-07-30 RX ADMIN — CITALOPRAM HYDROBROMIDE 40 MG: 20 TABLET ORAL at 09:12

## 2023-07-30 RX ADMIN — ALPRAZOLAM 2 MG: 0.5 TABLET ORAL at 17:33

## 2023-07-30 RX ADMIN — SODIUM CHLORIDE, PRESERVATIVE FREE 10 ML: 5 INJECTION INTRAVENOUS at 21:41

## 2023-07-30 RX ADMIN — BUMETANIDE 0.5 MG: 0.25 INJECTION, SOLUTION INTRAMUSCULAR; INTRAVENOUS at 09:12

## 2023-07-30 RX ADMIN — MORPHINE SULFATE 90 MG: 30 TABLET, FILM COATED, EXTENDED RELEASE ORAL at 09:12

## 2023-07-30 RX ADMIN — MELATONIN 3 MG: at 22:20

## 2023-07-30 RX ADMIN — ENOXAPARIN SODIUM 40 MG: 100 INJECTION SUBCUTANEOUS at 09:11

## 2023-07-30 RX ADMIN — GABAPENTIN 300 MG: 300 CAPSULE ORAL at 14:00

## 2023-07-30 RX ADMIN — OXYCODONE HYDROCHLORIDE 5 MG: 5 TABLET ORAL at 15:47

## 2023-07-30 RX ADMIN — FAMOTIDINE 40 MG: 20 TABLET, FILM COATED ORAL at 09:12

## 2023-07-30 RX ADMIN — ASPIRIN 81 MG: 81 TABLET, COATED ORAL at 09:12

## 2023-07-30 RX ADMIN — ROSUVASTATIN CALCIUM 40 MG: 40 TABLET, COATED ORAL at 09:13

## 2023-07-30 RX ADMIN — BUSPIRONE HYDROCHLORIDE 15 MG: 5 TABLET ORAL at 14:00

## 2023-07-30 RX ADMIN — Medication 10 UNITS: at 17:32

## 2023-07-30 RX ADMIN — BUSPIRONE HYDROCHLORIDE 15 MG: 5 TABLET ORAL at 21:38

## 2023-07-30 RX ADMIN — GABAPENTIN 300 MG: 300 CAPSULE ORAL at 09:12

## 2023-07-30 RX ADMIN — Medication 10 UNITS: at 09:11

## 2023-07-30 RX ADMIN — BUSPIRONE HYDROCHLORIDE 15 MG: 5 TABLET ORAL at 09:12

## 2023-07-30 RX ADMIN — Medication 5 UNITS: at 12:20

## 2023-07-30 RX ADMIN — ALBUMIN (HUMAN) 25 G: 0.25 INJECTION, SOLUTION INTRAVENOUS at 15:58

## 2023-07-30 RX ADMIN — GABAPENTIN 300 MG: 300 CAPSULE ORAL at 21:00

## 2023-07-30 RX ADMIN — Medication: at 09:13

## 2023-07-30 RX ADMIN — Medication 2 UNITS: at 09:11

## 2023-07-30 RX ADMIN — OXYCODONE HYDROCHLORIDE 5 MG: 5 TABLET ORAL at 11:18

## 2023-07-30 RX ADMIN — ARIPIPRAZOLE 30 MG: 5 TABLET ORAL at 09:09

## 2023-07-30 RX ADMIN — BUMETANIDE 2 MG: 0.25 INJECTION, SOLUTION INTRAMUSCULAR; INTRAVENOUS at 15:47

## 2023-07-30 RX ADMIN — MORPHINE SULFATE 90 MG: 30 TABLET, FILM COATED, EXTENDED RELEASE ORAL at 21:38

## 2023-07-30 NOTE — BSMART NOTE
Initial BSMART Liaison Assessment Form     Section I - Integrated Summary    Chief Complaint is to consider stopping SSRI celexa and replacing it with a different class [of medication]. Is it causing persistent hyponatremia? LOS:  3 days     Presenting problem/Summary:  Liaison met with pt f/f in her room on the medical floor at [facility]. Pt sitting up watching tv upon arrival. Pt has arm and mouth tremors. Pt appears alert, calm, cooperative, pleasant, and easily engaged with interview. Pt's mood euthymic. She does smile often, although her affect is somewhat blunted. Pt says that she came to the hospital b/c she keeps having problems with low sodium. She is a Type 1 diabetic. She explains that her attending physician advised that her psychotropic medication may be contributing to her low sodium. She says that her legs are swollen to twice their normal size and she needs help to reduce the swelling. She has tremors for the past 2 months b/c she had 2 diabetic comas, \"back to back,\" 2 months ago. Pt says that her boyfriend takes care of her. She talks to her  daily. She denies SI/HI/AH/VH. She denies hx of suicide attempts. Her sleep \"sucks,\" and so she takes xanax to sleep when she awakens. She says that last night she slept for 2 hours and thought is was morning. Her anxiety has increased over the past 2 months. Increased panic attacks, including one 2-3 days ago. Liaison did talk with pt of the importance for her to continue with outpatient psychiatric and therapeutic tx. Provided her with the contact information for the [facility] \"Same Day Access\" contact information. Discussed potential services provided there. Also provided pt with list of other outpatient [address] [place] [address] referrals. Liaison will continue to monitor and to support. [employer] are medical.    The information is given by the spouse/SO. Current Psychiatrist and/or  is her  and her PCP.   Previous

## 2023-07-30 NOTE — TELEPHONE ENCOUNTER
This note was inadvertently documented in the incorrect chart and was cut and pasted into the correct chart as the patient has the same last name and same  but different first name.

## 2023-07-30 NOTE — PLAN OF CARE
Problem: Discharge Planning  Goal: Discharge to home or other facility with appropriate resources  7/30/2023 1126 by Michael Cazares RN  Outcome: Progressing  7/30/2023 0506 by Klaus Peña RN  Outcome: Progressing     Problem: Pain  Goal: Verbalizes/displays adequate comfort level or baseline comfort level  7/30/2023 1126 by Michael Cazares RN  Outcome: Progressing  7/30/2023 0506 by Klaus Peña RN  Outcome: Progressing     Problem: Safety - Adult  Goal: Free from fall injury  7/30/2023 1126 by Michael Cazares RN  Outcome: Progressing  7/30/2023 0506 by Klaus Peña RN  Outcome: Progressing     Problem: Chronic Conditions and Co-morbidities  Goal: Patient's chronic conditions and co-morbidity symptoms are monitored and maintained or improved  7/30/2023 1126 by Michael Cazares RN  Outcome: Progressing  7/30/2023 0506 by Klaus Peña RN  Outcome: Progressing

## 2023-07-30 NOTE — TELEPHONE ENCOUNTER
Dr. Olivia Herrera inadvertently documented the following note in a different chart with the same last name but first name listed as [address] with same :    2023  Ramy Mcgovern MD     RS     9:18 PM  Note      Pt called noting that she is having an issue of a low BG. She is currently taking Lantau 35 units every morning and Humalog 10 units with each meal. She reports that the last insulin she took was the Lantus 35 units this AM. He BG is currently 45. I recommended she drink 8 oz of apple juice and repeat her BG in 2 hours. Pt instructed to decrease her Lantus from 35 units to 30 units daily, till she sees Dr. Carlyon Gosselin.

## 2023-07-31 ENCOUNTER — APPOINTMENT (OUTPATIENT)
Facility: HOSPITAL | Age: 53
DRG: 641 | End: 2023-07-31
Payer: MEDICARE

## 2023-07-31 VITALS
TEMPERATURE: 98.2 F | BODY MASS INDEX: 31.15 KG/M2 | HEART RATE: 75 BPM | DIASTOLIC BLOOD PRESSURE: 80 MMHG | RESPIRATION RATE: 16 BRPM | SYSTOLIC BLOOD PRESSURE: 108 MMHG | HEIGHT: 65 IN | WEIGHT: 186.95 LBS | OXYGEN SATURATION: 99 %

## 2023-07-31 LAB
ALBUMIN SERPL-MCNC: 2.9 G/DL (ref 3.5–5)
ALBUMIN/GLOB SERPL: 1 (ref 1.1–2.2)
ALP SERPL-CCNC: 62 U/L (ref 45–117)
ALT SERPL-CCNC: 18 U/L (ref 12–78)
ANION GAP SERPL CALC-SCNC: 3 MMOL/L (ref 5–15)
AST SERPL-CCNC: 17 U/L (ref 15–37)
BILIRUB SERPL-MCNC: 0.3 MG/DL (ref 0.2–1)
BUN SERPL-MCNC: 23 MG/DL (ref 6–20)
BUN/CREAT SERPL: 22 (ref 12–20)
CALCIUM SERPL-MCNC: 8.5 MG/DL (ref 8.5–10.1)
CHLORIDE SERPL-SCNC: 95 MMOL/L (ref 97–108)
CO2 SERPL-SCNC: 31 MMOL/L (ref 21–32)
CREAT SERPL-MCNC: 1.05 MG/DL (ref 0.55–1.02)
ERYTHROCYTE [DISTWIDTH] IN BLOOD BY AUTOMATED COUNT: 25 % (ref 11.5–14.5)
GLOBULIN SER CALC-MCNC: 2.8 G/DL (ref 2–4)
GLUCOSE BLD STRIP.AUTO-MCNC: 317 MG/DL (ref 65–117)
GLUCOSE BLD STRIP.AUTO-MCNC: 369 MG/DL (ref 65–117)
GLUCOSE SERPL-MCNC: 288 MG/DL (ref 65–100)
HCT VFR BLD AUTO: 24.8 % (ref 35–47)
HGB BLD-MCNC: 7.4 G/DL (ref 11.5–16)
MAGNESIUM SERPL-MCNC: 1.8 MG/DL (ref 1.6–2.4)
MCH RBC QN AUTO: 21.4 PG (ref 26–34)
MCHC RBC AUTO-ENTMCNC: 29.8 G/DL (ref 30–36.5)
MCV RBC AUTO: 71.7 FL (ref 80–99)
NRBC # BLD: 0 K/UL (ref 0–0.01)
NRBC BLD-RTO: 0 PER 100 WBC
PLATELET # BLD AUTO: 364 K/UL (ref 150–400)
PMV BLD AUTO: 9.4 FL (ref 8.9–12.9)
POTASSIUM SERPL-SCNC: 4.1 MMOL/L (ref 3.5–5.1)
PROT SERPL-MCNC: 5.7 G/DL (ref 6.4–8.2)
RBC # BLD AUTO: 3.46 M/UL (ref 3.8–5.2)
SERVICE CMNT-IMP: ABNORMAL
SERVICE CMNT-IMP: ABNORMAL
SODIUM SERPL-SCNC: 129 MMOL/L (ref 136–145)
WBC # BLD AUTO: 5.2 K/UL (ref 3.6–11)

## 2023-07-31 PROCEDURE — 36415 COLL VENOUS BLD VENIPUNCTURE: CPT

## 2023-07-31 PROCEDURE — 85027 COMPLETE CBC AUTOMATED: CPT

## 2023-07-31 PROCEDURE — 6370000000 HC RX 637 (ALT 250 FOR IP): Performed by: INTERNAL MEDICINE

## 2023-07-31 PROCEDURE — 6360000002 HC RX W HCPCS: Performed by: INTERNAL MEDICINE

## 2023-07-31 PROCEDURE — 2580000003 HC RX 258: Performed by: INTERNAL MEDICINE

## 2023-07-31 PROCEDURE — 83735 ASSAY OF MAGNESIUM: CPT

## 2023-07-31 PROCEDURE — 82962 GLUCOSE BLOOD TEST: CPT

## 2023-07-31 PROCEDURE — 94760 N-INVAS EAR/PLS OXIMETRY 1: CPT

## 2023-07-31 PROCEDURE — 80053 COMPREHEN METABOLIC PANEL: CPT

## 2023-07-31 PROCEDURE — 2500000003 HC RX 250 WO HCPCS: Performed by: INTERNAL MEDICINE

## 2023-07-31 PROCEDURE — 71045 X-RAY EXAM CHEST 1 VIEW: CPT

## 2023-07-31 RX ORDER — BACITRACIN ZINC 500 [USP'U]/G
OINTMENT TOPICAL 2 TIMES DAILY
Status: DISCONTINUED | OUTPATIENT
Start: 2023-07-31 | End: 2023-07-31 | Stop reason: HOSPADM

## 2023-07-31 RX ORDER — MAGNESIUM SULFATE 1 G/100ML
1000 INJECTION INTRAVENOUS ONCE
Status: COMPLETED | OUTPATIENT
Start: 2023-07-31 | End: 2023-07-31

## 2023-07-31 RX ORDER — BACITRACIN ZINC 500 [USP'U]/G
OINTMENT TOPICAL
Qty: 30 G | Refills: 1 | Status: ON HOLD | OUTPATIENT
Start: 2023-07-31 | End: 2023-08-10

## 2023-07-31 RX ADMIN — CITALOPRAM HYDROBROMIDE 40 MG: 20 TABLET ORAL at 09:27

## 2023-07-31 RX ADMIN — Medication 10 UNITS: at 11:38

## 2023-07-31 RX ADMIN — MORPHINE SULFATE 90 MG: 30 TABLET, FILM COATED, EXTENDED RELEASE ORAL at 09:25

## 2023-07-31 RX ADMIN — ENOXAPARIN SODIUM 40 MG: 100 INJECTION SUBCUTANEOUS at 09:27

## 2023-07-31 RX ADMIN — ALPRAZOLAM 2 MG: 0.5 TABLET ORAL at 10:29

## 2023-07-31 RX ADMIN — Medication: at 09:28

## 2023-07-31 RX ADMIN — ARIPIPRAZOLE 30 MG: 5 TABLET ORAL at 09:27

## 2023-07-31 RX ADMIN — ALPRAZOLAM 2 MG: 0.5 TABLET ORAL at 05:06

## 2023-07-31 RX ADMIN — OXYCODONE HYDROCHLORIDE 5 MG: 5 TABLET ORAL at 11:35

## 2023-07-31 RX ADMIN — FAMOTIDINE 40 MG: 20 TABLET, FILM COATED ORAL at 09:27

## 2023-07-31 RX ADMIN — BUSPIRONE HYDROCHLORIDE 15 MG: 5 TABLET ORAL at 09:27

## 2023-07-31 RX ADMIN — BUSPIRONE HYDROCHLORIDE 15 MG: 5 TABLET ORAL at 13:17

## 2023-07-31 RX ADMIN — Medication 4 UNITS: at 11:38

## 2023-07-31 RX ADMIN — ROSUVASTATIN CALCIUM 40 MG: 40 TABLET, COATED ORAL at 09:26

## 2023-07-31 RX ADMIN — MAGNESIUM SULFATE HEPTAHYDRATE 1000 MG: 1 INJECTION, SOLUTION INTRAVENOUS at 11:18

## 2023-07-31 RX ADMIN — Medication 10 UNITS: at 09:24

## 2023-07-31 RX ADMIN — SODIUM CHLORIDE, PRESERVATIVE FREE 10 ML: 5 INJECTION INTRAVENOUS at 09:27

## 2023-07-31 RX ADMIN — Medication 3 UNITS: at 09:28

## 2023-07-31 RX ADMIN — PANTOPRAZOLE SODIUM 40 MG: 40 TABLET, DELAYED RELEASE ORAL at 06:07

## 2023-07-31 RX ADMIN — GABAPENTIN 300 MG: 300 CAPSULE ORAL at 13:17

## 2023-07-31 RX ADMIN — GABAPENTIN 300 MG: 300 CAPSULE ORAL at 09:27

## 2023-07-31 RX ADMIN — BUMETANIDE 0.5 MG: 0.25 INJECTION, SOLUTION INTRAMUSCULAR; INTRAVENOUS at 09:25

## 2023-07-31 RX ADMIN — BACITRACIN ZINC: 500 OINTMENT TOPICAL at 13:20

## 2023-07-31 RX ADMIN — ASPIRIN 81 MG: 81 TABLET, COATED ORAL at 09:27

## 2023-07-31 ASSESSMENT — PAIN DESCRIPTION - DESCRIPTORS
DESCRIPTORS: ACHING
DESCRIPTORS: ACHING

## 2023-07-31 ASSESSMENT — PAIN SCALES - GENERAL
PAINLEVEL_OUTOF10: 8

## 2023-07-31 ASSESSMENT — PAIN DESCRIPTION - ORIENTATION
ORIENTATION: MID
ORIENTATION: RIGHT;LEFT

## 2023-07-31 ASSESSMENT — PAIN DESCRIPTION - LOCATION
LOCATION: HEAD
LOCATION: HEAD

## 2023-07-31 NOTE — CARE COORDINATION
Transition of Care Plan:    RUR: 28%  Prior Level of Functioning: independent   Disposition: home  If SNF or IPR: Date FOC offered:   Date FOC received:   Accepting facility:   Date authorization started with reference number:   Date authorization received and expires: Follow up appointments: PCP, specialists as indicated   DME needed: N/A, has home O2  Transportation at discharge: father, ETA 3PM  IM/IMM Medicare/ letter given: given   Is patient a  and connected with VA? No   If yes, was [employer] transfer form completed and VA notified? Caregiver Contact: Duke (significant other)   Discharge Caregiver contacted prior to discharge? Pt to contact   Care Conference needed? No  Barriers to discharge:  none       07/31/23 1425   Services At/After Discharge   Transition of Care Consult (CM Consult) Discharge [facility] None   [place] Resource Information Provided? No   Mode of Transport at Discharge Other (see comment)  (father)   Hospital Transport Time of Discharge 1500   Confirm Follow Up Transport Family   Condition of Participation: Discharge Planning   The Plan for Transition of Care is related to the following treatment goals: N/A- no services indicated   The Patient and/or Patient Representative was provided with a Choice of Provider? Patient   The Patient and/Or Patient Representative agree with the Discharge Plan? Yes   Freedom of Choice list was provided with basic dialogue that supports the patient's individualized plan of care/goals, treatment preferences, and shares the quality data associated with the providers? No  (N/A)       Chart reviewed. CM aware of discharge order. Met with pt at bedside to finalize and review d/c plan. 2nd IMM provided. Pt's father to transport home. Pt has home O2 already in place. No further CM needs identified. Ready for d/c from CM standpoint.     BARBRA Parham   Care Manager, 5 [facility]

## 2023-07-31 NOTE — DISCHARGE SUMMARY
sounds  Neurologic:       Alert and oriented X 3, normal speech,   Psych:   Good insight. Not anxious nor agitated  Skin:                No rashes. No jaundice    Discharge/Recent Laboratory Results:  Recent Labs     07/29/23  0321 07/29/23  1431 07/31/23  0402   *   < > 129*   K 4.3   < > 4.1   CL 93*   < > 95*   CO2 31   < > 31   BUN 18   < > 23*   CREATININE 0.93   < > 1.05*   GLUCOSE 264*   < > 288*   CALCIUM 8.0*   < > 8.5   PHOS 4.0  --   --    MG 2.0   < > 1.8    < > = values in this interval not displayed. Recent Labs     07/31/23  0402   HGB 7.4*   HCT 24.8*   WBC 5.2          Discharge Medications:     Medication List        START taking these medications      bacitracin zinc 500 UNIT/GM ointment  Apply topically 2 times daily. CONTINUE taking these medications      albuterol sulfate  (90 Base) MCG/ACT inhaler  Commonly known as: PROVENTIL;VENTOLIN;PROAIR     ALPRAZolam 2 MG tablet  Commonly known as: XANAX     ARIPiprazole 30 MG tablet  Commonly known as: ABILIFY     aspirin 81 MG EC tablet     B-D ULTRAFINE III SHORT PEN 31G X 8 MM Misc  Generic drug: Insulin Pen Needle  1 each by Does not apply route 3 times daily Pharmacist to identify & substitute with preferred needle for insulin pen device.      brexpiprazole 0.5 MG Tabs tablet  Commonly known as: REXULTI     bumetanide 2 MG tablet  Commonly known as: BUMEX  Take 0.5 tablets by mouth 2 times daily     busPIRone 15 MG tablet  Commonly known as: BUSPAR     butalbital-acetaminophen-caffeine -40 MG per tablet  Commonly known as: FIORICET, ESGIC     diclofenac sodium 1 % Gel  Commonly known as: VOLTAREN     esomeprazole Magnesium 40 MG Pack  Commonly known as: NEXIUM     famotidine 40 MG tablet  Commonly known as: PEPCID     fluticasone 50 MCG/ACT nasal spray  Commonly known as: FLONASE     gabapentin 600 MG tablet  Commonly known as: NEURONTIN     Glucagon Emergency 1 MG Kit  USE AS DIRECTED IN KIT INSTRUCTIONS

## 2023-07-31 NOTE — PLAN OF CARE
Problem: Discharge Planning  Goal: Discharge to home or other facility with appropriate resources  Outcome: Adequate for Discharge  Flowsheets (Taken 7/31/2023 0900)  Discharge to home or other facility with appropriate resources: Identify barriers to discharge with patient and caregiver     Problem: Pain  Goal: Verbalizes/displays adequate comfort level or baseline comfort level  Outcome: Adequate for Discharge     Problem: Safety - Adult  Goal: Free from fall injury  Outcome: Adequate for Discharge     Problem: Chronic Conditions and Co-morbidities  Goal: Patient's chronic conditions and co-morbidity symptoms are monitored and maintained or improved  Outcome: Adequate for Discharge  Flowsheets (Taken 7/31/2023 0900)  Care Plan - Patient's Chronic Conditions and Co-Morbidity Symptoms are Monitored and Maintained or Improved: Monitor and assess patient's chronic conditions and comorbid symptoms for stability, deterioration, or improvement

## 2023-07-31 NOTE — BH NOTE
Psychiatry consult update:  NP called to complete consult via telehealth and was informed that patient is being discharged and consult was no longer needed. Nurse verified w/ patient that consult was okay to be discontinued.

## 2023-07-31 NOTE — PLAN OF CARE
Problem: Discharge Planning  Goal: Discharge to home or other facility with appropriate resources  7/30/2023 2217 by Rolando Nayak RN  Outcome: Progressing  7/30/2023 1126 by Umm Farias RN  Outcome: Progressing     Problem: Pain  Goal: Verbalizes/displays adequate comfort level or baseline comfort level  7/30/2023 2217 by Rolando Nayak RN  Outcome: Progressing  7/30/2023 1126 by Umm Farias RN  Outcome: Progressing     Problem: Safety - Adult  Goal: Free from fall injury  7/30/2023 2217 by Rolando Nayak RN  Outcome: Progressing  7/30/2023 1126 by Umm Farias RN  Outcome: Progressing     Problem: Chronic Conditions and Co-morbidities  Goal: Patient's chronic conditions and co-morbidity symptoms are monitored and maintained or improved  7/30/2023 2217 by Rolando Nayak RN  Outcome: Progressing  7/30/2023 1126 by Umm Farias RN  Outcome: Progressing

## 2023-08-03 ENCOUNTER — HOSPITAL ENCOUNTER (INPATIENT)
Facility: HOSPITAL | Age: 53
LOS: 10 days | Discharge: HOME | DRG: 640 | End: 2023-08-13
Attending: STUDENT IN AN ORGANIZED HEALTH CARE EDUCATION/TRAINING PROGRAM | Admitting: STUDENT IN AN ORGANIZED HEALTH CARE EDUCATION/TRAINING PROGRAM
Payer: MEDICARE

## 2023-08-03 ENCOUNTER — APPOINTMENT (OUTPATIENT)
Facility: HOSPITAL | Age: 53
DRG: 640 | End: 2023-08-03
Payer: MEDICARE

## 2023-08-03 DIAGNOSIS — E87.1 HYPONATREMIA: Primary | ICD-10-CM

## 2023-08-03 DIAGNOSIS — R51.9 NONINTRACTABLE HEADACHE, UNSPECIFIED CHRONICITY PATTERN, UNSPECIFIED HEADACHE TYPE: ICD-10-CM

## 2023-08-03 DIAGNOSIS — R07.9 CHEST PAIN: ICD-10-CM

## 2023-08-03 DIAGNOSIS — I50.30 DIASTOLIC HEART FAILURE, UNSPECIFIED HF CHRONICITY (HCC): ICD-10-CM

## 2023-08-03 LAB
ALBUMIN SERPL-MCNC: 3.4 G/DL (ref 3.5–5)
ALBUMIN/GLOB SERPL: 1 (ref 1.1–2.2)
ALP SERPL-CCNC: 77 U/L (ref 45–117)
ALT SERPL-CCNC: 21 U/L (ref 12–78)
ANION GAP SERPL CALC-SCNC: 7 MMOL/L (ref 5–15)
AST SERPL-CCNC: 21 U/L (ref 15–37)
BASOPHILS # BLD: 0 K/UL (ref 0–0.1)
BASOPHILS NFR BLD: 0 % (ref 0–1)
BILIRUB SERPL-MCNC: 0.3 MG/DL (ref 0.2–1)
BUN SERPL-MCNC: 31 MG/DL (ref 6–20)
BUN/CREAT SERPL: 21 (ref 12–20)
CALCIUM SERPL-MCNC: 9.1 MG/DL (ref 8.5–10.1)
CHLORIDE SERPL-SCNC: 88 MMOL/L (ref 97–108)
CO2 SERPL-SCNC: 30 MMOL/L (ref 21–32)
CREAT SERPL-MCNC: 1.47 MG/DL (ref 0.55–1.02)
DIFFERENTIAL METHOD BLD: ABNORMAL
EOSINOPHIL # BLD: 0.1 K/UL (ref 0–0.4)
EOSINOPHIL NFR BLD: 1 % (ref 0–7)
ERYTHROCYTE [DISTWIDTH] IN BLOOD BY AUTOMATED COUNT: 25 % (ref 11.5–14.5)
GLOBULIN SER CALC-MCNC: 3.3 G/DL (ref 2–4)
GLUCOSE BLD STRIP.AUTO-MCNC: 102 MG/DL (ref 65–117)
GLUCOSE BLD STRIP.AUTO-MCNC: 45 MG/DL (ref 65–117)
GLUCOSE BLD STRIP.AUTO-MCNC: 55 MG/DL (ref 65–117)
GLUCOSE BLD STRIP.AUTO-MCNC: 88 MG/DL (ref 65–117)
GLUCOSE BLD STRIP.AUTO-MCNC: 98 MG/DL (ref 65–117)
GLUCOSE SERPL-MCNC: 52 MG/DL (ref 65–100)
HCT VFR BLD AUTO: 25.8 % (ref 35–47)
HGB BLD-MCNC: 8.1 G/DL (ref 11.5–16)
IMM GRANULOCYTES # BLD AUTO: 0.1 K/UL (ref 0–0.04)
IMM GRANULOCYTES NFR BLD AUTO: 1 % (ref 0–0.5)
LYMPHOCYTES # BLD: 2.1 K/UL (ref 0.8–3.5)
LYMPHOCYTES NFR BLD: 25 % (ref 12–49)
MCH RBC QN AUTO: 21.5 PG (ref 26–34)
MCHC RBC AUTO-ENTMCNC: 31.4 G/DL (ref 30–36.5)
MCV RBC AUTO: 68.4 FL (ref 80–99)
MONOCYTES # BLD: 1 K/UL (ref 0–1)
MONOCYTES NFR BLD: 12 % (ref 5–13)
NEUTS SEG # BLD: 5.1 K/UL (ref 1.8–8)
NEUTS SEG NFR BLD: 61 % (ref 32–75)
NRBC # BLD: 0 K/UL (ref 0–0.01)
NRBC BLD-RTO: 0 PER 100 WBC
PLATELET # BLD AUTO: 448 K/UL (ref 150–400)
PMV BLD AUTO: 9 FL (ref 8.9–12.9)
POTASSIUM SERPL-SCNC: 3.7 MMOL/L (ref 3.5–5.1)
PROT SERPL-MCNC: 6.7 G/DL (ref 6.4–8.2)
RBC # BLD AUTO: 3.77 M/UL (ref 3.8–5.2)
RBC MORPH BLD: ABNORMAL
SERVICE CMNT-IMP: ABNORMAL
SERVICE CMNT-IMP: ABNORMAL
SERVICE CMNT-IMP: NORMAL
SODIUM SERPL-SCNC: 125 MMOL/L (ref 136–145)
WBC # BLD AUTO: 8.4 K/UL (ref 3.6–11)

## 2023-08-03 PROCEDURE — 83735 ASSAY OF MAGNESIUM: CPT

## 2023-08-03 PROCEDURE — 2580000003 HC RX 258: Performed by: STUDENT IN AN ORGANIZED HEALTH CARE EDUCATION/TRAINING PROGRAM

## 2023-08-03 PROCEDURE — 1100000003 HC PRIVATE W/ TELEMETRY

## 2023-08-03 PROCEDURE — 6370000000 HC RX 637 (ALT 250 FOR IP): Performed by: STUDENT IN AN ORGANIZED HEALTH CARE EDUCATION/TRAINING PROGRAM

## 2023-08-03 PROCEDURE — 36415 COLL VENOUS BLD VENIPUNCTURE: CPT

## 2023-08-03 PROCEDURE — 80053 COMPREHEN METABOLIC PANEL: CPT

## 2023-08-03 PROCEDURE — 99285 EMERGENCY DEPT VISIT HI MDM: CPT

## 2023-08-03 PROCEDURE — 85025 COMPLETE CBC W/AUTO DIFF WBC: CPT

## 2023-08-03 PROCEDURE — 93005 ELECTROCARDIOGRAM TRACING: CPT | Performed by: STUDENT IN AN ORGANIZED HEALTH CARE EDUCATION/TRAINING PROGRAM

## 2023-08-03 PROCEDURE — 82962 GLUCOSE BLOOD TEST: CPT

## 2023-08-03 PROCEDURE — 70450 CT HEAD/BRAIN W/O DYE: CPT

## 2023-08-03 RX ORDER — FAMOTIDINE 20 MG/1
40 TABLET, FILM COATED ORAL DAILY
Status: DISCONTINUED | OUTPATIENT
Start: 2023-08-04 | End: 2023-08-13 | Stop reason: HOSPADM

## 2023-08-03 RX ORDER — DEXTROSE MONOHYDRATE 100 MG/ML
INJECTION, SOLUTION INTRAVENOUS CONTINUOUS
Status: DISCONTINUED | OUTPATIENT
Start: 2023-08-03 | End: 2023-08-03

## 2023-08-03 RX ORDER — INSULIN LISPRO 100 [IU]/ML
0-8 INJECTION, SOLUTION INTRAVENOUS; SUBCUTANEOUS
Status: DISCONTINUED | OUTPATIENT
Start: 2023-08-04 | End: 2023-08-13 | Stop reason: HOSPADM

## 2023-08-03 RX ORDER — MORPHINE SULFATE 30 MG/1
90 TABLET, FILM COATED, EXTENDED RELEASE ORAL EVERY 12 HOURS SCHEDULED
Status: DISCONTINUED | OUTPATIENT
Start: 2023-08-03 | End: 2023-08-12

## 2023-08-03 RX ORDER — SODIUM CHLORIDE 9 MG/ML
INJECTION, SOLUTION INTRAVENOUS PRN
Status: DISCONTINUED | OUTPATIENT
Start: 2023-08-03 | End: 2023-08-13 | Stop reason: HOSPADM

## 2023-08-03 RX ORDER — ACETAMINOPHEN 650 MG/1
650 SUPPOSITORY RECTAL EVERY 6 HOURS PRN
Status: DISCONTINUED | OUTPATIENT
Start: 2023-08-03 | End: 2023-08-13 | Stop reason: HOSPADM

## 2023-08-03 RX ORDER — GABAPENTIN 300 MG/1
300 CAPSULE ORAL 3 TIMES DAILY
Status: DISCONTINUED | OUTPATIENT
Start: 2023-08-03 | End: 2023-08-13 | Stop reason: HOSPADM

## 2023-08-03 RX ORDER — SODIUM CHLORIDE 1 G/1
1 TABLET ORAL
Status: DISCONTINUED | OUTPATIENT
Start: 2023-08-03 | End: 2023-08-11

## 2023-08-03 RX ORDER — ESOMEPRAZOLE MAGNESIUM 40 MG/1
40 FOR SUSPENSION ORAL DAILY
Status: DISCONTINUED | OUTPATIENT
Start: 2023-08-04 | End: 2023-08-03

## 2023-08-03 RX ORDER — BUSPIRONE HYDROCHLORIDE 7.5 MG/1
15 TABLET ORAL 2 TIMES DAILY
Status: DISCONTINUED | OUTPATIENT
Start: 2023-08-03 | End: 2023-08-13 | Stop reason: HOSPADM

## 2023-08-03 RX ORDER — ALBUTEROL SULFATE 90 UG/1
2 AEROSOL, METERED RESPIRATORY (INHALATION) EVERY 6 HOURS PRN
Status: DISCONTINUED | OUTPATIENT
Start: 2023-08-03 | End: 2023-08-13 | Stop reason: HOSPADM

## 2023-08-03 RX ORDER — ASPIRIN 81 MG/1
81 TABLET ORAL DAILY
Status: DISCONTINUED | OUTPATIENT
Start: 2023-08-04 | End: 2023-08-13 | Stop reason: HOSPADM

## 2023-08-03 RX ORDER — SODIUM CHLORIDE 0.9 % (FLUSH) 0.9 %
5-40 SYRINGE (ML) INJECTION EVERY 12 HOURS SCHEDULED
Status: DISCONTINUED | OUTPATIENT
Start: 2023-08-03 | End: 2023-08-13 | Stop reason: HOSPADM

## 2023-08-03 RX ORDER — INSULIN LISPRO 100 [IU]/ML
0-4 INJECTION, SOLUTION INTRAVENOUS; SUBCUTANEOUS NIGHTLY
Status: DISCONTINUED | OUTPATIENT
Start: 2023-08-03 | End: 2023-08-13 | Stop reason: HOSPADM

## 2023-08-03 RX ORDER — ALPRAZOLAM 0.5 MG/1
1 TABLET ORAL 3 TIMES DAILY PRN
Status: DISCONTINUED | OUTPATIENT
Start: 2023-08-03 | End: 2023-08-05

## 2023-08-03 RX ORDER — ENOXAPARIN SODIUM 100 MG/ML
40 INJECTION SUBCUTANEOUS DAILY
Status: DISCONTINUED | OUTPATIENT
Start: 2023-08-04 | End: 2023-08-13 | Stop reason: HOSPADM

## 2023-08-03 RX ORDER — OXYCODONE HYDROCHLORIDE 5 MG/1
5 TABLET ORAL
Status: COMPLETED | OUTPATIENT
Start: 2023-08-03 | End: 2023-08-03

## 2023-08-03 RX ORDER — SODIUM CHLORIDE 0.9 % (FLUSH) 0.9 %
5-40 SYRINGE (ML) INJECTION PRN
Status: DISCONTINUED | OUTPATIENT
Start: 2023-08-03 | End: 2023-08-13 | Stop reason: HOSPADM

## 2023-08-03 RX ORDER — DEXTROSE MONOHYDRATE 25 G/50ML
25 INJECTION, SOLUTION INTRAVENOUS PRN
Status: DISCONTINUED | OUTPATIENT
Start: 2023-08-03 | End: 2023-08-03

## 2023-08-03 RX ORDER — BUMETANIDE 1 MG/1
1 TABLET ORAL 2 TIMES DAILY
Status: DISCONTINUED | OUTPATIENT
Start: 2023-08-03 | End: 2023-08-03

## 2023-08-03 RX ORDER — ACETAMINOPHEN 325 MG/1
650 TABLET ORAL EVERY 6 HOURS PRN
Status: DISCONTINUED | OUTPATIENT
Start: 2023-08-03 | End: 2023-08-13 | Stop reason: HOSPADM

## 2023-08-03 RX ORDER — FERROUS SULFATE 325(65) MG
325 TABLET ORAL
Status: DISCONTINUED | OUTPATIENT
Start: 2023-08-03 | End: 2023-08-13 | Stop reason: HOSPADM

## 2023-08-03 RX ORDER — DEXTROSE MONOHYDRATE 100 MG/ML
INJECTION, SOLUTION INTRAVENOUS CONTINUOUS PRN
Status: DISCONTINUED | OUTPATIENT
Start: 2023-08-03 | End: 2023-08-13 | Stop reason: HOSPADM

## 2023-08-03 RX ORDER — ONDANSETRON 2 MG/ML
4 INJECTION INTRAMUSCULAR; INTRAVENOUS EVERY 6 HOURS PRN
Status: DISCONTINUED | OUTPATIENT
Start: 2023-08-03 | End: 2023-08-13 | Stop reason: HOSPADM

## 2023-08-03 RX ORDER — ROSUVASTATIN CALCIUM 40 MG/1
40 TABLET, COATED ORAL DAILY
Status: DISCONTINUED | OUTPATIENT
Start: 2023-08-04 | End: 2023-08-13 | Stop reason: HOSPADM

## 2023-08-03 RX ORDER — ARIPIPRAZOLE 5 MG/1
30 TABLET ORAL DAILY
Status: DISCONTINUED | OUTPATIENT
Start: 2023-08-04 | End: 2023-08-13 | Stop reason: HOSPADM

## 2023-08-03 RX ORDER — POLYETHYLENE GLYCOL 3350 17 G/17G
17 POWDER, FOR SOLUTION ORAL DAILY PRN
Status: DISCONTINUED | OUTPATIENT
Start: 2023-08-03 | End: 2023-08-13 | Stop reason: HOSPADM

## 2023-08-03 RX ORDER — ONDANSETRON 4 MG/1
4 TABLET, ORALLY DISINTEGRATING ORAL EVERY 8 HOURS PRN
Status: DISCONTINUED | OUTPATIENT
Start: 2023-08-03 | End: 2023-08-13 | Stop reason: HOSPADM

## 2023-08-03 RX ADMIN — DEXTROSE MONOHYDRATE: 100 INJECTION, SOLUTION INTRAVENOUS at 19:14

## 2023-08-03 RX ADMIN — OXYCODONE HYDROCHLORIDE 5 MG: 5 TABLET ORAL at 20:11

## 2023-08-03 RX ADMIN — GABAPENTIN 300 MG: 300 CAPSULE ORAL at 23:52

## 2023-08-03 RX ADMIN — FERROUS SULFATE TAB 325 MG (65 MG ELEMENTAL FE) 325 MG: 325 (65 FE) TAB at 23:53

## 2023-08-03 ASSESSMENT — PAIN SCALES - GENERAL: PAINLEVEL_OUTOF10: 8

## 2023-08-03 ASSESSMENT — PAIN DESCRIPTION - ORIENTATION: ORIENTATION: LEFT;RIGHT

## 2023-08-03 ASSESSMENT — PAIN DESCRIPTION - DESCRIPTORS: DESCRIPTORS: STABBING

## 2023-08-03 ASSESSMENT — PAIN DESCRIPTION - LOCATION: LOCATION: LEG

## 2023-08-03 NOTE — ED TRIAGE NOTES
Pt arrives via EMS from home cc of headache that started 2 hours ago. Pt was informed by her PCP that her sodium is 120. Pt has had tremors x2 months. Pt reports taking Bumex x1 month. Edema bilaterally in lower extremities. Per EMS, BGL was 67 upon arrival, EMS administered 15g oral glucose which brought her BGL up to 95. Pt is on 3lpm via NC at baseline.

## 2023-08-03 NOTE — ED NOTES
Verbal shift change report given to Ludy (oncoming nurse) by Ramon Nageotte RN (offgoing nurse). Report included the following information Nurse Handoff Report, ED SBAR, MAR, and Recent Results.         Cher He RN  08/03/23 3666

## 2023-08-04 DIAGNOSIS — I10 ESSENTIAL (PRIMARY) HYPERTENSION: ICD-10-CM

## 2023-08-04 DIAGNOSIS — R79.89 ELEVATED LFTS: ICD-10-CM

## 2023-08-04 DIAGNOSIS — E10.21 TYPE 1 DIABETES MELLITUS WITH DIABETIC NEPHROPATHY (HCC): ICD-10-CM

## 2023-08-04 DIAGNOSIS — E78.2 MIXED HYPERLIPIDEMIA: ICD-10-CM

## 2023-08-04 LAB
ANION GAP SERPL CALC-SCNC: 5 MMOL/L (ref 5–15)
ANION GAP SERPL CALC-SCNC: 6 MMOL/L (ref 5–15)
ANION GAP SERPL CALC-SCNC: 8 MMOL/L (ref 5–15)
ANION GAP SERPL CALC-SCNC: 8 MMOL/L (ref 5–15)
BASOPHILS # BLD: 0 K/UL (ref 0–0.1)
BASOPHILS NFR BLD: 0 % (ref 0–1)
BUN SERPL-MCNC: 27 MG/DL (ref 6–20)
BUN SERPL-MCNC: 28 MG/DL (ref 6–20)
BUN SERPL-MCNC: 30 MG/DL (ref 6–20)
BUN SERPL-MCNC: 31 MG/DL (ref 6–20)
BUN/CREAT SERPL: 18 (ref 12–20)
BUN/CREAT SERPL: 19 (ref 12–20)
BUN/CREAT SERPL: 20 (ref 12–20)
BUN/CREAT SERPL: 22 (ref 12–20)
CALCIUM SERPL-MCNC: 8.2 MG/DL (ref 8.5–10.1)
CALCIUM SERPL-MCNC: 8.4 MG/DL (ref 8.5–10.1)
CALCIUM SERPL-MCNC: 8.5 MG/DL (ref 8.5–10.1)
CALCIUM SERPL-MCNC: 8.6 MG/DL (ref 8.5–10.1)
CHLORIDE SERPL-SCNC: 87 MMOL/L (ref 97–108)
CHLORIDE SERPL-SCNC: 89 MMOL/L (ref 97–108)
CHLORIDE SERPL-SCNC: 89 MMOL/L (ref 97–108)
CHLORIDE SERPL-SCNC: 90 MMOL/L (ref 97–108)
CO2 SERPL-SCNC: 31 MMOL/L (ref 21–32)
CO2 SERPL-SCNC: 31 MMOL/L (ref 21–32)
CO2 SERPL-SCNC: 33 MMOL/L (ref 21–32)
CO2 SERPL-SCNC: 34 MMOL/L (ref 21–32)
CREAT SERPL-MCNC: 1.44 MG/DL (ref 0.55–1.02)
CREAT SERPL-MCNC: 1.44 MG/DL (ref 0.55–1.02)
CREAT SERPL-MCNC: 1.47 MG/DL (ref 0.55–1.02)
CREAT SERPL-MCNC: 1.5 MG/DL (ref 0.55–1.02)
DIFFERENTIAL METHOD BLD: ABNORMAL
EKG ATRIAL RATE: 107 BPM
EKG DIAGNOSIS: NORMAL
EKG P AXIS: 57 DEGREES
EKG P-R INTERVAL: 158 MS
EKG Q-T INTERVAL: 340 MS
EKG QRS DURATION: 76 MS
EKG QTC CALCULATION (BAZETT): 453 MS
EKG R AXIS: -3 DEGREES
EKG T AXIS: 51 DEGREES
EKG VENTRICULAR RATE: 107 BPM
EOSINOPHIL # BLD: 0.1 K/UL (ref 0–0.4)
EOSINOPHIL NFR BLD: 1 % (ref 0–7)
ERYTHROCYTE [DISTWIDTH] IN BLOOD BY AUTOMATED COUNT: 24.8 % (ref 11.5–14.5)
GLUCOSE BLD STRIP.AUTO-MCNC: 102 MG/DL (ref 65–117)
GLUCOSE BLD STRIP.AUTO-MCNC: 109 MG/DL (ref 65–117)
GLUCOSE BLD STRIP.AUTO-MCNC: 109 MG/DL (ref 65–117)
GLUCOSE BLD STRIP.AUTO-MCNC: 116 MG/DL (ref 65–117)
GLUCOSE BLD STRIP.AUTO-MCNC: 128 MG/DL (ref 65–117)
GLUCOSE BLD STRIP.AUTO-MCNC: 129 MG/DL (ref 65–117)
GLUCOSE BLD STRIP.AUTO-MCNC: 167 MG/DL (ref 65–117)
GLUCOSE BLD STRIP.AUTO-MCNC: 174 MG/DL (ref 65–117)
GLUCOSE BLD STRIP.AUTO-MCNC: 227 MG/DL (ref 65–117)
GLUCOSE BLD STRIP.AUTO-MCNC: 269 MG/DL (ref 65–117)
GLUCOSE BLD STRIP.AUTO-MCNC: 45 MG/DL (ref 65–117)
GLUCOSE BLD STRIP.AUTO-MCNC: 50 MG/DL (ref 65–117)
GLUCOSE BLD STRIP.AUTO-MCNC: 57 MG/DL (ref 65–117)
GLUCOSE BLD STRIP.AUTO-MCNC: 61 MG/DL (ref 65–117)
GLUCOSE BLD STRIP.AUTO-MCNC: 62 MG/DL (ref 65–117)
GLUCOSE SERPL-MCNC: 117 MG/DL (ref 65–100)
GLUCOSE SERPL-MCNC: 118 MG/DL (ref 65–100)
GLUCOSE SERPL-MCNC: 66 MG/DL (ref 65–100)
GLUCOSE SERPL-MCNC: 84 MG/DL (ref 65–100)
HCT VFR BLD AUTO: 23.5 % (ref 35–47)
HGB BLD-MCNC: 7.3 G/DL (ref 11.5–16)
IMM GRANULOCYTES # BLD AUTO: 0 K/UL (ref 0–0.04)
IMM GRANULOCYTES NFR BLD AUTO: 1 % (ref 0–0.5)
LYMPHOCYTES # BLD: 1.6 K/UL (ref 0.8–3.5)
LYMPHOCYTES NFR BLD: 26 % (ref 12–49)
MAGNESIUM SERPL-MCNC: 2.1 MG/DL (ref 1.6–2.4)
MAGNESIUM SERPL-MCNC: 2.2 MG/DL (ref 1.6–2.4)
MCH RBC QN AUTO: 21.7 PG (ref 26–34)
MCHC RBC AUTO-ENTMCNC: 31.1 G/DL (ref 30–36.5)
MCV RBC AUTO: 69.7 FL (ref 80–99)
MONOCYTES # BLD: 0.8 K/UL (ref 0–1)
MONOCYTES NFR BLD: 13 % (ref 5–13)
NEUTS SEG # BLD: 3.7 K/UL (ref 1.8–8)
NEUTS SEG NFR BLD: 60 % (ref 32–75)
NRBC # BLD: 0 K/UL (ref 0–0.01)
NRBC BLD-RTO: 0 PER 100 WBC
PLATELET # BLD AUTO: 397 K/UL (ref 150–400)
PMV BLD AUTO: 9.4 FL (ref 8.9–12.9)
POTASSIUM SERPL-SCNC: 3.4 MMOL/L (ref 3.5–5.1)
POTASSIUM SERPL-SCNC: 3.5 MMOL/L (ref 3.5–5.1)
POTASSIUM SERPL-SCNC: 3.5 MMOL/L (ref 3.5–5.1)
POTASSIUM SERPL-SCNC: 3.8 MMOL/L (ref 3.5–5.1)
RBC # BLD AUTO: 3.37 M/UL (ref 3.8–5.2)
SERVICE CMNT-IMP: ABNORMAL
SERVICE CMNT-IMP: NORMAL
SODIUM SERPL-SCNC: 126 MMOL/L (ref 136–145)
SODIUM SERPL-SCNC: 126 MMOL/L (ref 136–145)
SODIUM SERPL-SCNC: 129 MMOL/L (ref 136–145)
SODIUM SERPL-SCNC: 130 MMOL/L (ref 136–145)
WBC # BLD AUTO: 6.3 K/UL (ref 3.6–11)

## 2023-08-04 PROCEDURE — 6370000000 HC RX 637 (ALT 250 FOR IP): Performed by: STUDENT IN AN ORGANIZED HEALTH CARE EDUCATION/TRAINING PROGRAM

## 2023-08-04 PROCEDURE — 36415 COLL VENOUS BLD VENIPUNCTURE: CPT

## 2023-08-04 PROCEDURE — 1100000003 HC PRIVATE W/ TELEMETRY

## 2023-08-04 PROCEDURE — 85025 COMPLETE CBC W/AUTO DIFF WBC: CPT

## 2023-08-04 PROCEDURE — 83735 ASSAY OF MAGNESIUM: CPT

## 2023-08-04 PROCEDURE — 80048 BASIC METABOLIC PNL TOTAL CA: CPT

## 2023-08-04 PROCEDURE — 82962 GLUCOSE BLOOD TEST: CPT

## 2023-08-04 PROCEDURE — 6360000002 HC RX W HCPCS: Performed by: STUDENT IN AN ORGANIZED HEALTH CARE EDUCATION/TRAINING PROGRAM

## 2023-08-04 PROCEDURE — 2580000003 HC RX 258: Performed by: STUDENT IN AN ORGANIZED HEALTH CARE EDUCATION/TRAINING PROGRAM

## 2023-08-04 PROCEDURE — 6370000000 HC RX 637 (ALT 250 FOR IP): Performed by: INTERNAL MEDICINE

## 2023-08-04 RX ORDER — BUMETANIDE 1 MG/1
1 TABLET ORAL DAILY
Status: DISCONTINUED | OUTPATIENT
Start: 2023-08-04 | End: 2023-08-06

## 2023-08-04 RX ORDER — DEXTROSE MONOHYDRATE 100 MG/ML
INJECTION, SOLUTION INTRAVENOUS CONTINUOUS
Status: ACTIVE | OUTPATIENT
Start: 2023-08-04 | End: 2023-08-04

## 2023-08-04 RX ADMIN — Medication 16 G: at 03:25

## 2023-08-04 RX ADMIN — FAMOTIDINE 40 MG: 20 TABLET, FILM COATED ORAL at 09:10

## 2023-08-04 RX ADMIN — DEXTROSE MONOHYDRATE 125 ML: 100 INJECTION, SOLUTION INTRAVENOUS at 05:17

## 2023-08-04 RX ADMIN — BUSPIRONE HYDROCHLORIDE 15 MG: 7.5 TABLET ORAL at 09:12

## 2023-08-04 RX ADMIN — BUSPIRONE HYDROCHLORIDE 15 MG: 7.5 TABLET ORAL at 00:21

## 2023-08-04 RX ADMIN — SODIUM CHLORIDE, PRESERVATIVE FREE 10 ML: 5 INJECTION INTRAVENOUS at 21:03

## 2023-08-04 RX ADMIN — Medication 1 G: at 16:30

## 2023-08-04 RX ADMIN — ARIPIPRAZOLE 30 MG: 5 TABLET ORAL at 09:24

## 2023-08-04 RX ADMIN — ACETAMINOPHEN 650 MG: 325 TABLET ORAL at 17:44

## 2023-08-04 RX ADMIN — Medication 16 G: at 01:16

## 2023-08-04 RX ADMIN — BUMETANIDE 1 MG: 1 TABLET ORAL at 10:00

## 2023-08-04 RX ADMIN — MORPHINE SULFATE 90 MG: 30 TABLET, FILM COATED, EXTENDED RELEASE ORAL at 00:23

## 2023-08-04 RX ADMIN — BUSPIRONE HYDROCHLORIDE 15 MG: 7.5 TABLET ORAL at 20:33

## 2023-08-04 RX ADMIN — GABAPENTIN 300 MG: 300 CAPSULE ORAL at 20:33

## 2023-08-04 RX ADMIN — BREXPIPRAZOLE 1 MG: 1 TABLET ORAL at 09:24

## 2023-08-04 RX ADMIN — GABAPENTIN 300 MG: 300 CAPSULE ORAL at 09:10

## 2023-08-04 RX ADMIN — MORPHINE SULFATE 90 MG: 30 TABLET, FILM COATED, EXTENDED RELEASE ORAL at 20:34

## 2023-08-04 RX ADMIN — SODIUM CHLORIDE, PRESERVATIVE FREE 10 ML: 5 INJECTION INTRAVENOUS at 09:14

## 2023-08-04 RX ADMIN — Medication 1 G: at 09:12

## 2023-08-04 RX ADMIN — GABAPENTIN 300 MG: 300 CAPSULE ORAL at 14:39

## 2023-08-04 RX ADMIN — ROSUVASTATIN CALCIUM 40 MG: 40 TABLET, COATED ORAL at 09:10

## 2023-08-04 RX ADMIN — Medication 1 G: at 11:22

## 2023-08-04 RX ADMIN — MORPHINE SULFATE 90 MG: 30 TABLET, FILM COATED, EXTENDED RELEASE ORAL at 09:12

## 2023-08-04 RX ADMIN — ENOXAPARIN SODIUM 40 MG: 100 INJECTION SUBCUTANEOUS at 09:13

## 2023-08-04 RX ADMIN — Medication 1 G: at 00:20

## 2023-08-04 RX ADMIN — ALPRAZOLAM 1 MG: 0.5 TABLET ORAL at 21:01

## 2023-08-04 RX ADMIN — DEXTROSE MONOHYDRATE: 100 INJECTION, SOLUTION INTRAVENOUS at 02:15

## 2023-08-04 RX ADMIN — ASPIRIN 81 MG: 81 TABLET, COATED ORAL at 09:10

## 2023-08-04 ASSESSMENT — PAIN SCALES - GENERAL
PAINLEVEL_OUTOF10: 3
PAINLEVEL_OUTOF10: 0

## 2023-08-04 ASSESSMENT — PAIN DESCRIPTION - ORIENTATION: ORIENTATION: MID

## 2023-08-04 ASSESSMENT — PAIN DESCRIPTION - DESCRIPTORS: DESCRIPTORS: ACHING

## 2023-08-04 ASSESSMENT — PAIN DESCRIPTION - LOCATION: LOCATION: HEAD

## 2023-08-04 NOTE — ED PROVIDER NOTES
MPV 9.0 8.9 - 12.9 FL    Nucleated RBCs 0.0 0  WBC    nRBC 0.00 0.00 - 0.01 K/uL    Neutrophils % 61 32 - 75 %    Lymphocytes % 25 12 - 49 %    Monocytes % 12 5 - 13 %    Eosinophils % 1 0 - 7 %    Basophils % 0 0 - 1 %    Immature Granulocytes 1 (H) 0.0 - 0.5 %    Neutrophils Absolute 5.1 1.8 - 8.0 K/UL    Lymphocytes Absolute 2.1 0.8 - 3.5 K/UL    Monocytes Absolute 1.0 0.0 - 1.0 K/UL    Eosinophils Absolute 0.1 0.0 - 0.4 K/UL    Basophils Absolute 0.0 0.0 - 0.1 K/UL    Absolute Immature Granulocyte 0.1 (H) 0.00 - 0.04 K/UL    Differential Type SMEAR SCANNED      RBC Comment ANISOCYTOSIS  2+        RBC Comment OVALOCYTES  PRESENT        RBC Comment MICROCYTOSIS  PRESENT       Comprehensive Metabolic Panel    Collection Time: 08/03/23  6:31 PM   Result Value Ref Range    Sodium 125 (L) 136 - 145 mmol/L    Potassium 3.7 3.5 - 5.1 mmol/L    Chloride 88 (L) 97 - 108 mmol/L    CO2 30 21 - 32 mmol/L    Anion Gap 7 5 - 15 mmol/L    Glucose 52 (LL) 65 - 100 mg/dL    BUN 31 (H) 6 - 20 MG/DL    Creatinine 1.47 (H) 0.55 - 1.02 MG/DL    Bun/Cre Ratio 21 (H) 12 - 20      Est, Glom Filt Rate 43 (L) >60 ml/min/1.73m2    Calcium 9.1 8.5 - 10.1 MG/DL    Total Bilirubin 0.3 0.2 - 1.0 MG/DL    ALT 21 12 - 78 U/L    AST 21 15 - 37 U/L    Alk Phosphatase 77 45 - 117 U/L    Total Protein 6.7 6.4 - 8.2 g/dL    Albumin 3.4 (L) 3.5 - 5.0 g/dL    Globulin 3.3 2.0 - 4.0 g/dL    Albumin/Globulin Ratio 1.0 (L) 1.1 - 2.2     POCT Glucose    Collection Time: 08/03/23  6:43 PM   Result Value Ref Range    POC Glucose 45 (LL) 65 - 117 mg/dL    Performed by: Kang CARRASCO    POCT Glucose    Collection Time: 08/03/23  7:01 PM   Result Value Ref Range    POC Glucose 55 (L) 65 - 117 mg/dL    Performed by: Kang CARRASCO    POCT Glucose    Collection Time: 08/03/23  7:21 PM   Result Value Ref Range    POC Glucose 88 65 - 117 mg/dL    Performed by:  Mireille Jiang RN    POCT Glucose    Collection Time: 08/03/23  8:20 PM   Result 34 (H) 21 - 32 mmol/L    Anion Gap 5 5 - 15 mmol/L    Glucose 84 65 - 100 mg/dL    BUN 30 (H) 6 - 20 MG/DL    Creatinine 1.47 (H) 0.55 - 1.02 MG/DL    Bun/Cre Ratio 20 12 - 20      Est, Glom Filt Rate 43 (L) >60 ml/min/1.73m2    Calcium 8.2 (L) 8.5 - 10.1 MG/DL   CBC with Auto Differential    Collection Time: 08/04/23  6:18 AM   Result Value Ref Range    WBC 6.3 3.6 - 11.0 K/uL    RBC 3.37 (L) 3.80 - 5.20 M/uL    Hemoglobin 7.3 (L) 11.5 - 16.0 g/dL    Hematocrit 23.5 (L) 35.0 - 47.0 %    MCV 69.7 (L) 80.0 - 99.0 FL    MCH 21.7 (L) 26.0 - 34.0 PG    MCHC 31.1 30.0 - 36.5 g/dL    RDW 24.8 (H) 11.5 - 14.5 %    Platelets 153 496 - 402 K/uL    MPV 9.4 8.9 - 12.9 FL    Nucleated RBCs 0.0 0  WBC    nRBC 0.00 0.00 - 0.01 K/uL    Neutrophils % 60 32 - 75 %    Lymphocytes % 26 12 - 49 %    Monocytes % 13 5 - 13 %    Eosinophils % 1 0 - 7 %    Basophils % 0 0 - 1 %    Immature Granulocytes 1 (H) 0.0 - 0.5 %    Neutrophils Absolute 3.7 1.8 - 8.0 K/UL    Lymphocytes Absolute 1.6 0.8 - 3.5 K/UL    Monocytes Absolute 0.8 0.0 - 1.0 K/UL    Eosinophils Absolute 0.1 0.0 - 0.4 K/UL    Basophils Absolute 0.0 0.0 - 0.1 K/UL    Absolute Immature Granulocyte 0.0 0.00 - 0.04 K/UL    Differential Type AUTOMATED     Magnesium    Collection Time: 08/04/23  6:18 AM   Result Value Ref Range    Magnesium 2.2 1.6 - 2.4 mg/dL   POCT Glucose    Collection Time: 08/04/23  8:38 AM   Result Value Ref Range    POC Glucose 109 65 - 117 mg/dL    Performed by: Anamika Arguello PCT    POCT Glucose    Collection Time: 08/04/23 10:03 AM   Result Value Ref Range    POC Glucose 128 (H) 65 - 117 mg/dL    Performed by: Char Wu RN    POCT Glucose    Collection Time: 08/04/23 10:30 AM   Result Value Ref Range    POC Glucose 116 65 - 117 mg/dL    Performed by:  Pratibha Jackson PCT         RADIOLOGY:  Non-plain film images such as CT, Ultrasound and MRI are read by the radiologist. Plain radiographic images are visualized and preliminarily interpreted by the

## 2023-08-04 NOTE — CONSULTS
Needle (B-D ULTRAFINE III SHORT PEN) 31G X 8 MM MISC 1 each by Does not apply route 3 times daily Pharmacist to identify & substitute with preferred needle for insulin pen device. 6/30/23 9/28/23  SANTANA Horne - CNS   bumetanide (BUMEX) 2 MG tablet Take 0.5 tablets by mouth 2 times daily 6/30/23 10/28/23  Franky Deleon MD   nicotine (NICODERM CQ) 14 MG/24HR Place 1 patch onto the skin daily 7/1/23   Franky Deleon MD   insulin lispro (HUMALOG) 100 UNIT/ML SOLN injection vial Inject 10 units before meals + 2 units for every 50 mg/dl above 150 mg/dl--put back on list 06/30/23 6/30/23   Cindy Nevraez MD   ALPRAZolam Herman Pencil) 2 MG tablet Take 1 tablet by mouth 3 times daily as needed for Anxiety. Historical Provider, MD   ARIPiprazole (ABILIFY) 30 MG tablet Take 1 tablet by mouth daily    Historical Provider, MD   esomeprazole Magnesium (NEXIUM) 40 MG PACK Take 1 packet by mouth daily    Historical Provider, MD   Morphine Sulfate  MG T12A Take 100 mg by mouth in the morning and at bedtime.  Max Daily Amount: 200 mg    Historical Provider, MD   albuterol sulfate HFA (PROVENTIL;VENTOLIN;PROAIR) 108 (90 Base) MCG/ACT inhaler Inhale 2 puffs into the lungs every 6 hours as needed 2/28/23   Ar Automatic Reconciliation   aspirin 81 MG EC tablet Take 1 tablet by mouth daily    Ar Automatic Reconciliation   brexpiprazole (REXULTI) 0.5 MG TABS tablet Take 2 tablets by mouth daily    Ar Automatic Reconciliation   busPIRone (BUSPAR) 15 MG tablet Take 15 mg by mouth 2 times daily    Ar Automatic Reconciliation   butalbital-acetaminophen-caffeine (FIORICET, ESGIC) -40 MG per tablet Take 1 tablet by mouth every 6 hours as needed 2/13/23   Ar Automatic Reconciliation   diclofenac sodium (VOLTAREN) 1 % GEL Apply 2 g topically 4 times daily as needed    Ar Automatic Reconciliation   famotidine (PEPCID) 40 MG tablet Take 1 tablet by mouth daily 1/27/23   Ar Automatic Reconciliation   fluticasone (FLONASE) 50 Amount: 200 mg   albuterol sulfate HFA (PROVENTIL;VENTOLIN;PROAIR) 108 (90 Base) MCG/ACT inhaler  Self Yes No   Sig: Inhale 2 puffs into the lungs every 6 hours as needed   aspirin 81 MG EC tablet  Self Yes No   Sig: Take 1 tablet by mouth daily   bacitracin zinc 500 UNIT/GM ointment   No No   Sig: Apply topically 2 times daily. brexpiprazole (REXULTI) 0.5 MG TABS tablet  Self Yes No   Sig: Take 2 tablets by mouth daily   bumetanide (BUMEX) 2 MG tablet   No No   Sig: Take 0.5 tablets by mouth 2 times daily   busPIRone (BUSPAR) 15 MG tablet  Self Yes No   Sig: Take 15 mg by mouth 2 times daily   butalbital-acetaminophen-caffeine (FIORICET, ESGIC) -40 MG per tablet  Self Yes No   Sig: Take 1 tablet by mouth every 6 hours as needed   diclofenac sodium (VOLTAREN) 1 % GEL  Self Yes No   Sig: Apply 2 g topically 4 times daily as needed   esomeprazole Magnesium (NEXIUM) 40 MG PACK  Self Yes No   Sig: Take 1 packet by mouth daily   famotidine (PEPCID) 40 MG tablet  Self Yes No   Sig: Take 1 tablet by mouth daily   fluticasone (FLONASE) 50 MCG/ACT nasal spray  Self Yes No   Si sprays by Nasal route daily as needed   gabapentin (NEURONTIN) 600 MG tablet  Self Yes No   Sig: Take 0.5 tablets by mouth 3 times daily. insulin glargine (LANTUS) 100 UNIT/ML injection vial   Yes No   Sig: Inject 36 units at night--Dose change 23--updated med list--did not send prescription to the pharmacy   insulin lispro (HUMALOG) 100 UNIT/ML SOLN injection vial   Yes No   Sig: Inject 10 units before meals + 2 units for every 50 mg/dl above 150 mg/dl--put back on list 23   levonorgestrel (MIRENA) IUD 52 mg  Self Yes No   Si Device by IntraUTERine route once   naloxone 4 MG/0.1ML LIQD nasal spray  Self Yes No   Sig: Use 1 spray intranasally, then discard. Repeat with new spray every 2 min as needed for opioid overdose symptoms, alternating nostrils.    nicotine (NICODERM CQ) 14 MG/24HR   No No   Sig: Place 1 patch onto

## 2023-08-04 NOTE — CARE COORDINATION
Care Management Initial Assessment       RUR: 29% (high RUR, readmission)  Readmission? Yes - last discharged on 7/31/2023  1st IM letter given? Yes - 8/4/2023  1st  letter given: No      08/04/23 1106   Service Assessment   Patient Orientation Alert and Oriented   Cognition Alert   History Provided By Patient   Primary Caregiver Self   Accompanied By/Relationship N/A   Support Systems Spouse/Significant Other   Patient's Healthcare Decision Maker is: Named in [address]   PCP Verified by CM Yes   Last Visit to PCP Within last 3 months  (8/2/2023)   Prior Functional Level Independent in ADLs/IADLs   Current Functional Level Independent in ADLs/IADLs   Can patient return to prior living arrangement Yes   Ability to make needs known: Good   Family able to assist with home care needs: Yes   Would you like for me to discuss the discharge plan with any other family members/significant others, and if so, who? No   Financial Resources None   [employer] None   Social/Functional History   Lives With Significant other   Type of [facility]  One level  (4 LARRY)   Home Equipment Oxygen; Walker, rolling  (3L O2 concentrator and tanks through [facility])    [address]   Ambulation Assistance Independent   Active  No   Patient's  Info Pt's family transport   Discharge Planning   Type of Residence [employer]   Living Arrangements Spouse/Significant Other   Current Services Prior To Admission Oxygen Therapy   [facility]; O[facility] Therapy (Comment)   Potential Assistance Needed Home Care   DME Ordered?  No   Potential Assistance Purchasing Medications No   Type of Home Care Services None   Services At/After Discharge   Transition of Care Consult (CM Consult) N/A   Services At/After Discharge None   Mode of Transport at Discharge Other (see comment)  Sadaf Marion (CAM))   Confirm Follow Up Transport Self     STEVE Max, How Severe Is It?: moderate Is This A New Presentation, Or A Follow-Up?: Rash

## 2023-08-04 NOTE — PROGRESS NOTES
Hospitalist Progress Note    NAME:   Ivania Garcia   : 1970   MRN: [id number]     Date/Time: 2023 4:43 PM  Patient PCP: Lamont Odell MD    Estimated discharge date:  Barriers: Sodium improvement, nephrology clearance      Assessment / Plan:    Acute on chronic hyponatremia  Headaches  Diastolic heart failure without acute exacerbation  PERLA on CKD 3  Anemia of chronic disease    Nephrology consulted,  Resumed Bumex tomorrow  Continue monitoring sodium level  Sodium today 126       Hypoglycemia in setting of insulin-dependent diabetes mellitus  Corrective coverage insulin  Hold basal insulin in setting of hypoglycemia  -Resume as indicated  Accu-Cheks  Hypoglycemia protocol in place  Diabetic diet     Psychiatric disturbance  Bilateral upper extremity tremors  Continue PTA Abilify, Rexulti, BuSpar  Continue PTA as needed Xanax-decrease to 1 mg given oversedation, monitor for signs of withdrawal  Tremor evaluated by neurology at prior encounter with MRI brain demonstrating chronic small vessel ischemic disease without acute stroke-plan for outpatient follow-up in 6-8 weeks     Chronic pain secondary to endometriosis with opioid dependence  Continue PTA morphine  Hold PTA oxycodone-oversedated at time of my exam and opiates are known to contribute to hyponatremia  Recommend attempts be made to wean opioids in outpatient setting particularly in setting of multiple psychoactive medications  Continue PTA gabapentin          Medical Decision Making:   I personally reviewed labs: CBC, BMP  I personally reviewed imaging: CT head  I personally reviewed EKG: Yes  Toxic drug monitoring: Monitor sodium level  Discussed case with: IDR, nurse        Code Status: Full  DVT Prophylaxis: Lovenox  GI Prophylaxis:    Subjective:     Chief Complaint / Reason for Physician Visit  \"\". Discussed with RN events overnight. Objective:     VITALS:   Last 24hrs VS reviewed since prior progress note.  Most recent

## 2023-08-04 NOTE — ED NOTES
Pt used [facility] x1 assist. Back in bed now. Tolerated well.      Peter Coffman RN  08/03/23 2026

## 2023-08-04 NOTE — PROGRESS NOTES
0700: Bedside and Verbal shift change report given to Sandra Díaz RN (oncoming nurse) by Luciana Nguyen RN (offgoing nurse). Report included the following information Nurse Handoff Report, Index, Intake/Output, MAR, and Recent Results. 0900: [employer] Program  Patient Mobility Score 7. Patient repositioned into chair before Breakfast with stand-by assist.   Chair alarm in place and activated. Bedside table, call bell and personal belongings left within reach of the patient. 1145: Lab drawn and sent to lab via tube station     End of Shift Note    Bedside shift change report given to RN (oncoming nurse) by Lili Link RN (offgoing nurse). Report included the following information SBAR, Kardex, Intake/Output, MAR, and Recent Results    Shift worked:  5911-5142     Shift summary and any significant changes:     See above     Concerns for physician to address:  N/a     Zone phone for oncoming shift:          Activity:     Number times ambulated in hallways past shift: 0  Number of times OOB to chair past shift: 3    Cardiac:   Cardiac Monitoring: Yes           Access:  Current line(s): PIV     Genitourinary:   Urinary status: voiding    Respiratory:      Chronic home O2 use?: YES  Incentive spirometer at bedside: NO       GI:     Current diet:  ADULT DIET; Regular; 4 carb choices (60 gm/meal); 1500 ml  Passing flatus: YES  Tolerating current diet: YES       Pain Management:   Patient states pain is manageable on current regimen: YES    Skin:     Interventions: turn team, specialty bed, float heels, increase time out of bed, limit briefs, and nutritional support    Patient Safety:  Fall Score:    Interventions: bed/chair alarm, assistive device (walker, cane.  etc), gripper socks, pt to call before getting OOB, and stay with me (per policy)       Length of Stay:  Expected LOS: 4  Actual LOS: 1      Lili Link RN

## 2023-08-04 NOTE — PROGRESS NOTES
TRANSFER - IN REPORT:    0020 Verbal report received from [address] [place] [address],[address] on [facility]  being received from ED for routine progression of patient care      Report consisted of patient's Situation, Background, Assessment and   Recommendations(SBAR). Information from the following report(s) Nurse Handoff Report, ED Encounter Summary, ED SBAR, MAR, Recent Results, and Cardiac Rhythm NSR  was reviewed with the receiving nurse. Opportunity for questions and clarification was provided. 4425  Assessment completed upon patient's arrival to unit and care assumed. 0115 POC BG 45.     0130 Glucose tablets given, had to be crushed d/t patient being unable to chew with no lower dentures. 0145 Repeat POC BG 50.     0200 Noted new order for D10 @ 50 mL/hr @0215am.   0215 Started D10 @ 50 mL/hr.    0300 POC BG 61    0325 Glucose tablet given     0400 POC BG 57    0428 Notified PA of BG trend and inability to remain awake. PA placed order to increase D10 infusion rate to 100 mL/hr.    0448 Order verified and increased D10 infusion to 100 mL/hr.     0510 BG 62    0517  D10 Bolus 125 mL @ 937mL/hr given d/t BG 57 and patient unable to remain awake enough to take glucose tablets again per PA.     0554           End of Shift Note    Bedside shift change report given to Lisa Soares (oncoming nurse) by Jose Jimenes, ADELIA (offgoing nurse). Report included the following information SBAR, Kardex, Intake/Output, MAR, Recent Results, and Cardiac Rhythm NSR    Shift worked:  7p-7a     Shift summary and any significant changes:     See above  Unable to complete admission database d/t patient not remaining awake to complete.      Concerns for physician to address:       Zone phone for oncoming shift:          Jose Jimenes, RN

## 2023-08-04 NOTE — H&P
Hospitalist Admission Note    NAME:  Salvador Brown   :  1970   MRN:  [id number]     Date/Time:  8/3/2023 10:51 PM    Patient PCP: Dayna Mello MD    ______________________________________________________________________  Given the patient's current clinical presentation, I have a high level of concern for decompensation if discharged from the emergency department. Complex decision making was performed, which includes reviewing the patient's available past medical records, laboratory results, and x-ray films. My assessment of this patient's clinical condition and my plan of care is as follows. Assessment / Plan:     Active Problems:  Acute on chronic hyponatremia  Headaches  Diastolic heart failure without acute exacerbation  PERLA on CKD 3  Hypoglycemia in setting of insulin-dependent diabetes mellitus  Anemia of chronic disease  Psychiatric disturbance  Bilateral upper extremity tremors  Chronic pain secondary to endometriosis with opioid dependence    Plan:  Acute on chronic hyponatremia  Headaches  Diastolic heart failure without acute exacerbation  PERLA on CKD 3  Anemia of chronic disease  Admit to telemetry monitoring  Nephrology consulted, greatly appreciate their expertise  Continue PTA Bumex  Work-up recently completed with nephrology-defer need for repeat labs  Start salt tablets  Recommend weaning from opiates as these can lead to hyponatremia  Consider antipsychotic adjustment as these are associated with hyponatremia  Discontinue PPI in absence of GIB and continue PTA Pepcid as PPIs are associated with hyponatremia  Continue 1.5 L fluid restriction  Hold PTA Bumex-patient appears intravascularly depleted  Start iron supplementation    Hypoglycemia in setting of insulin-dependent diabetes mellitus  Corrective coverage insulin  Hold basal insulin in setting of hypoglycemia  -Resume as indicated  Accu-Cheks  Hypoglycemia protocol in place  Diabetic diet    Psychiatric WBC 8.4 3.6 - 11.0 K/uL    RBC 3.77 (L) 3.80 - 5.20 M/uL    Hemoglobin 8.1 (L) 11.5 - 16.0 g/dL    Hematocrit 25.8 (L) 35.0 - 47.0 %    MCV 68.4 (L) 80.0 - 99.0 FL    MCH 21.5 (L) 26.0 - 34.0 PG    MCHC 31.4 30.0 - 36.5 g/dL    RDW 25.0 (H) 11.5 - 14.5 %    Platelets 771 (H) 214 - 400 K/uL    MPV 9.0 8.9 - 12.9 FL    Nucleated RBCs 0.0 0  WBC    nRBC 0.00 0.00 - 0.01 K/uL    Neutrophils % 61 32 - 75 %    Lymphocytes % 25 12 - 49 %    Monocytes % 12 5 - 13 %    Eosinophils % 1 0 - 7 %    Basophils % 0 0 - 1 %    Immature Granulocytes 1 (H) 0.0 - 0.5 %    Neutrophils Absolute 5.1 1.8 - 8.0 K/UL    Lymphocytes Absolute 2.1 0.8 - 3.5 K/UL    Monocytes Absolute 1.0 0.0 - 1.0 K/UL    Eosinophils Absolute 0.1 0.0 - 0.4 K/UL    Basophils Absolute 0.0 0.0 - 0.1 K/UL    Absolute Immature Granulocyte 0.1 (H) 0.00 - 0.04 K/UL    Differential Type SMEAR SCANNED      RBC Comment ANISOCYTOSIS  2+        RBC Comment OVALOCYTES  PRESENT        RBC Comment MICROCYTOSIS  PRESENT       Comprehensive Metabolic Panel    Collection Time: 08/03/23  6:31 PM   Result Value Ref Range    Sodium 125 (L) 136 - 145 mmol/L    Potassium 3.7 3.5 - 5.1 mmol/L    Chloride 88 (L) 97 - 108 mmol/L    CO2 30 21 - 32 mmol/L    Anion Gap 7 5 - 15 mmol/L    Glucose 52 (LL) 65 - 100 mg/dL    BUN 31 (H) 6 - 20 MG/DL    Creatinine 1.47 (H) 0.55 - 1.02 MG/DL    Bun/Cre Ratio 21 (H) 12 - 20      Est, Glom Filt Rate 43 (L) >60 ml/min/1.73m2    Calcium 9.1 8.5 - 10.1 MG/DL    Total Bilirubin 0.3 0.2 - 1.0 MG/DL    ALT 21 12 - 78 U/L    AST 21 15 - 37 U/L    Alk Phosphatase 77 45 - 117 U/L    Total Protein 6.7 6.4 - 8.2 g/dL    Albumin 3.4 (L) 3.5 - 5.0 g/dL    Globulin 3.3 2.0 - 4.0 g/dL    Albumin/Globulin Ratio 1.0 (L) 1.1 - 2.2     POCT Glucose    Collection Time: 08/03/23  6:43 PM   Result Value Ref Range    POC Glucose 45 (LL) 65 - 117 mg/dL    Performed by: Mony CARRASCO    POCT Glucose    Collection Time: 08/03/23  7:01 PM   Result Value Ref

## 2023-08-05 LAB
ALBUMIN SERPL-MCNC: 3 G/DL (ref 3.5–5)
ALBUMIN SERPL-MCNC: 3.2 G/DL (ref 3.5–5)
ANION GAP SERPL CALC-SCNC: 3 MMOL/L (ref 5–15)
ANION GAP SERPL CALC-SCNC: 4 MMOL/L (ref 5–15)
ANION GAP SERPL CALC-SCNC: 5 MMOL/L (ref 5–15)
ANION GAP SERPL CALC-SCNC: 6 MMOL/L (ref 5–15)
ANION GAP SERPL CALC-SCNC: 8 MMOL/L (ref 5–15)
ANION GAP SERPL CALC-SCNC: 9 MMOL/L (ref 5–15)
BASOPHILS # BLD: 0 K/UL (ref 0–0.1)
BASOPHILS NFR BLD: 0 % (ref 0–1)
BUN SERPL-MCNC: 22 MG/DL (ref 6–20)
BUN SERPL-MCNC: 23 MG/DL (ref 6–20)
BUN SERPL-MCNC: 23 MG/DL (ref 6–20)
BUN SERPL-MCNC: 26 MG/DL (ref 6–20)
BUN SERPL-MCNC: 27 MG/DL (ref 6–20)
BUN SERPL-MCNC: 28 MG/DL (ref 6–20)
BUN/CREAT SERPL: 17 (ref 12–20)
BUN/CREAT SERPL: 18 (ref 12–20)
BUN/CREAT SERPL: 19 (ref 12–20)
BUN/CREAT SERPL: 19 (ref 12–20)
BUN/CREAT SERPL: 20 (ref 12–20)
BUN/CREAT SERPL: 20 (ref 12–20)
CALCIUM SERPL-MCNC: 8.2 MG/DL (ref 8.5–10.1)
CALCIUM SERPL-MCNC: 8.2 MG/DL (ref 8.5–10.1)
CALCIUM SERPL-MCNC: 8.3 MG/DL (ref 8.5–10.1)
CALCIUM SERPL-MCNC: 8.3 MG/DL (ref 8.5–10.1)
CALCIUM SERPL-MCNC: 8.4 MG/DL (ref 8.5–10.1)
CALCIUM SERPL-MCNC: 8.4 MG/DL (ref 8.5–10.1)
CHLORIDE SERPL-SCNC: 87 MMOL/L (ref 97–108)
CHLORIDE SERPL-SCNC: 88 MMOL/L (ref 97–108)
CHLORIDE SERPL-SCNC: 89 MMOL/L (ref 97–108)
CO2 SERPL-SCNC: 27 MMOL/L (ref 21–32)
CO2 SERPL-SCNC: 28 MMOL/L (ref 21–32)
CO2 SERPL-SCNC: 30 MMOL/L (ref 21–32)
CO2 SERPL-SCNC: 30 MMOL/L (ref 21–32)
CO2 SERPL-SCNC: 32 MMOL/L (ref 21–32)
CO2 SERPL-SCNC: 33 MMOL/L (ref 21–32)
CREAT SERPL-MCNC: 1.14 MG/DL (ref 0.55–1.02)
CREAT SERPL-MCNC: 1.21 MG/DL (ref 0.55–1.02)
CREAT SERPL-MCNC: 1.3 MG/DL (ref 0.55–1.02)
CREAT SERPL-MCNC: 1.41 MG/DL (ref 0.55–1.02)
DIFFERENTIAL METHOD BLD: ABNORMAL
EOSINOPHIL # BLD: 0.1 K/UL (ref 0–0.4)
EOSINOPHIL NFR BLD: 1 % (ref 0–7)
ERYTHROCYTE [DISTWIDTH] IN BLOOD BY AUTOMATED COUNT: 25 % (ref 11.5–14.5)
GLUCOSE BLD STRIP.AUTO-MCNC: 180 MG/DL (ref 65–117)
GLUCOSE BLD STRIP.AUTO-MCNC: 191 MG/DL (ref 65–117)
GLUCOSE BLD STRIP.AUTO-MCNC: 194 MG/DL (ref 65–117)
GLUCOSE BLD STRIP.AUTO-MCNC: 202 MG/DL (ref 65–117)
GLUCOSE BLD STRIP.AUTO-MCNC: 203 MG/DL (ref 65–117)
GLUCOSE BLD STRIP.AUTO-MCNC: 214 MG/DL (ref 65–117)
GLUCOSE BLD STRIP.AUTO-MCNC: 257 MG/DL (ref 65–117)
GLUCOSE BLD STRIP.AUTO-MCNC: 265 MG/DL (ref 65–117)
GLUCOSE BLD STRIP.AUTO-MCNC: 276 MG/DL (ref 65–117)
GLUCOSE BLD STRIP.AUTO-MCNC: 281 MG/DL (ref 65–117)
GLUCOSE BLD STRIP.AUTO-MCNC: 290 MG/DL (ref 65–117)
GLUCOSE SERPL-MCNC: 162 MG/DL (ref 65–100)
GLUCOSE SERPL-MCNC: 188 MG/DL (ref 65–100)
GLUCOSE SERPL-MCNC: 190 MG/DL (ref 65–100)
GLUCOSE SERPL-MCNC: 199 MG/DL (ref 65–100)
GLUCOSE SERPL-MCNC: 200 MG/DL (ref 65–100)
GLUCOSE SERPL-MCNC: 254 MG/DL (ref 65–100)
HCT VFR BLD AUTO: 27.4 % (ref 35–47)
HGB BLD-MCNC: 8.1 G/DL (ref 11.5–16)
IMM GRANULOCYTES # BLD AUTO: 0 K/UL (ref 0–0.04)
IMM GRANULOCYTES NFR BLD AUTO: 0 % (ref 0–0.5)
LYMPHOCYTES # BLD: 2.3 K/UL (ref 0.8–3.5)
LYMPHOCYTES NFR BLD: 37 % (ref 12–49)
MCH RBC QN AUTO: 21.3 PG (ref 26–34)
MCHC RBC AUTO-ENTMCNC: 29.6 G/DL (ref 30–36.5)
MCV RBC AUTO: 72.1 FL (ref 80–99)
MONOCYTES # BLD: 0.8 K/UL (ref 0–1)
MONOCYTES NFR BLD: 13 % (ref 5–13)
NEUTS SEG # BLD: 3 K/UL (ref 1.8–8)
NEUTS SEG NFR BLD: 49 % (ref 32–75)
NRBC # BLD: 0 K/UL (ref 0–0.01)
NRBC BLD-RTO: 0 PER 100 WBC
PHOSPHATE SERPL-MCNC: 4.5 MG/DL (ref 2.6–4.7)
PHOSPHATE SERPL-MCNC: 5.7 MG/DL (ref 2.6–4.7)
PLATELET # BLD AUTO: 421 K/UL (ref 150–400)
PMV BLD AUTO: 9.2 FL (ref 8.9–12.9)
POTASSIUM SERPL-SCNC: 3.6 MMOL/L (ref 3.5–5.1)
POTASSIUM SERPL-SCNC: 3.7 MMOL/L (ref 3.5–5.1)
POTASSIUM SERPL-SCNC: 3.7 MMOL/L (ref 3.5–5.1)
POTASSIUM SERPL-SCNC: 3.9 MMOL/L (ref 3.5–5.1)
POTASSIUM SERPL-SCNC: 3.9 MMOL/L (ref 3.5–5.1)
POTASSIUM SERPL-SCNC: 4 MMOL/L (ref 3.5–5.1)
RBC # BLD AUTO: 3.8 M/UL (ref 3.8–5.2)
RBC MORPH BLD: ABNORMAL
SERVICE CMNT-IMP: ABNORMAL
SODIUM SERPL-SCNC: 122 MMOL/L (ref 136–145)
SODIUM SERPL-SCNC: 122 MMOL/L (ref 136–145)
SODIUM SERPL-SCNC: 123 MMOL/L (ref 136–145)
SODIUM SERPL-SCNC: 123 MMOL/L (ref 136–145)
SODIUM SERPL-SCNC: 124 MMOL/L (ref 136–145)
SODIUM SERPL-SCNC: 126 MMOL/L (ref 136–145)
WBC # BLD AUTO: 6.2 K/UL (ref 3.6–11)

## 2023-08-05 PROCEDURE — 2700000000 HC OXYGEN THERAPY PER DAY

## 2023-08-05 PROCEDURE — 80069 RENAL FUNCTION PANEL: CPT

## 2023-08-05 PROCEDURE — 82962 GLUCOSE BLOOD TEST: CPT

## 2023-08-05 PROCEDURE — 1100000003 HC PRIVATE W/ TELEMETRY

## 2023-08-05 PROCEDURE — 2580000003 HC RX 258: Performed by: STUDENT IN AN ORGANIZED HEALTH CARE EDUCATION/TRAINING PROGRAM

## 2023-08-05 PROCEDURE — 85025 COMPLETE CBC W/AUTO DIFF WBC: CPT

## 2023-08-05 PROCEDURE — 6370000000 HC RX 637 (ALT 250 FOR IP): Performed by: STUDENT IN AN ORGANIZED HEALTH CARE EDUCATION/TRAINING PROGRAM

## 2023-08-05 PROCEDURE — 6370000000 HC RX 637 (ALT 250 FOR IP): Performed by: INTERNAL MEDICINE

## 2023-08-05 PROCEDURE — 36415 COLL VENOUS BLD VENIPUNCTURE: CPT

## 2023-08-05 PROCEDURE — 80048 BASIC METABOLIC PNL TOTAL CA: CPT

## 2023-08-05 PROCEDURE — 6360000002 HC RX W HCPCS: Performed by: STUDENT IN AN ORGANIZED HEALTH CARE EDUCATION/TRAINING PROGRAM

## 2023-08-05 RX ORDER — ALPRAZOLAM 0.5 MG/1
2 TABLET ORAL 3 TIMES DAILY PRN
Status: DISCONTINUED | OUTPATIENT
Start: 2023-08-05 | End: 2023-08-13 | Stop reason: HOSPADM

## 2023-08-05 RX ADMIN — MORPHINE SULFATE 90 MG: 30 TABLET, FILM COATED, EXTENDED RELEASE ORAL at 09:01

## 2023-08-05 RX ADMIN — SODIUM CHLORIDE, PRESERVATIVE FREE 10 ML: 5 INJECTION INTRAVENOUS at 09:03

## 2023-08-05 RX ADMIN — GABAPENTIN 300 MG: 300 CAPSULE ORAL at 14:52

## 2023-08-05 RX ADMIN — BUMETANIDE 1 MG: 1 TABLET ORAL at 09:01

## 2023-08-05 RX ADMIN — ROSUVASTATIN CALCIUM 40 MG: 40 TABLET, COATED ORAL at 09:02

## 2023-08-05 RX ADMIN — Medication 1 G: at 16:59

## 2023-08-05 RX ADMIN — GABAPENTIN 300 MG: 300 CAPSULE ORAL at 20:53

## 2023-08-05 RX ADMIN — ARIPIPRAZOLE 30 MG: 5 TABLET ORAL at 09:02

## 2023-08-05 RX ADMIN — ALPRAZOLAM 1 MG: 0.5 TABLET ORAL at 01:48

## 2023-08-05 RX ADMIN — SODIUM CHLORIDE, PRESERVATIVE FREE 10 ML: 5 INJECTION INTRAVENOUS at 22:15

## 2023-08-05 RX ADMIN — BREXPIPRAZOLE 1 MG: 1 TABLET ORAL at 09:02

## 2023-08-05 RX ADMIN — ENOXAPARIN SODIUM 40 MG: 100 INJECTION SUBCUTANEOUS at 09:02

## 2023-08-05 RX ADMIN — ASPIRIN 81 MG: 81 TABLET, COATED ORAL at 09:01

## 2023-08-05 RX ADMIN — MORPHINE SULFATE 90 MG: 30 TABLET, FILM COATED, EXTENDED RELEASE ORAL at 22:12

## 2023-08-05 RX ADMIN — Medication 1 G: at 11:54

## 2023-08-05 RX ADMIN — ALPRAZOLAM 2 MG: 0.5 TABLET ORAL at 20:53

## 2023-08-05 RX ADMIN — Medication 4 UNITS: at 16:59

## 2023-08-05 RX ADMIN — FAMOTIDINE 40 MG: 20 TABLET, FILM COATED ORAL at 10:23

## 2023-08-05 RX ADMIN — FERROUS SULFATE TAB 325 MG (65 MG ELEMENTAL FE) 325 MG: 325 (65 FE) TAB at 22:12

## 2023-08-05 RX ADMIN — Medication 1 G: at 09:01

## 2023-08-05 RX ADMIN — BUSPIRONE HYDROCHLORIDE 15 MG: 7.5 TABLET ORAL at 09:01

## 2023-08-05 RX ADMIN — BUSPIRONE HYDROCHLORIDE 15 MG: 7.5 TABLET ORAL at 20:53

## 2023-08-05 RX ADMIN — GABAPENTIN 300 MG: 300 CAPSULE ORAL at 09:01

## 2023-08-05 ASSESSMENT — PAIN SCALES - GENERAL
PAINLEVEL_OUTOF10: 0
PAINLEVEL_OUTOF10: 0
PAINLEVEL_OUTOF10: 9
PAINLEVEL_OUTOF10: 9
PAINLEVEL_OUTOF10: 0
PAINLEVEL_OUTOF10: 0

## 2023-08-05 ASSESSMENT — PAIN DESCRIPTION - DESCRIPTORS
DESCRIPTORS: CRAMPING
DESCRIPTORS: ACHING

## 2023-08-05 ASSESSMENT — PAIN - FUNCTIONAL ASSESSMENT: PAIN_FUNCTIONAL_ASSESSMENT: PREVENTS OR INTERFERES SOME ACTIVE ACTIVITIES AND ADLS

## 2023-08-05 NOTE — PLAN OF CARE
Problem: Discharge Planning  Goal: Discharge to home or other facility with appropriate resources  8/4/2023 2355 by Angelica Barrios RN  Outcome: Progressing  8/4/2023 1333 by Marimar Guerrero RN  Outcome: Progressing     Problem: Pain  Goal: Verbalizes/displays adequate comfort level or baseline comfort level  8/4/2023 2355 by Angelica Barrios RN  Outcome: Progressing  8/4/2023 1333 by Marimar Guerrero RN  Outcome: Progressing     Problem: Safety - Adult  Goal: Free from fall injury  8/4/2023 2355 by Angelica Barrios RN  Outcome: Progressing  8/4/2023 1333 by Marimar Guerrero RN  Outcome: Progressing     Problem: Skin/Tissue Integrity  Goal: Absence of new skin breakdown  Description: 1. Monitor for areas of redness and/or skin breakdown  2. Assess vascular access sites hourly  3. Every 4-6 hours minimum:  Change oxygen saturation probe site  4. Every 4-6 hours:  If on nasal continuous positive airway pressure, respiratory therapy assess nares and determine need for appliance change or resting period.   8/4/2023 2355 by Angelica Barrios RN  Outcome: Progressing  8/4/2023 1333 by Marimar Guerrero RN  Outcome: Progressing     Problem: ABCDS Injury Assessment  Goal: Absence of physical injury  8/4/2023 2355 by Angelica Barrios RN  Outcome: Progressing  8/4/2023 1333 by Marimar Guerrero RN  Outcome: Progressing     Problem: Chronic Conditions and Co-morbidities  Goal: Patient's chronic conditions and co-morbidity symptoms are monitored and maintained or improved  Outcome: Progressing

## 2023-08-05 NOTE — PROGRESS NOTES
Hospitalist Progress Note    NAME:   Akosua Barnhart   : 1970   MRN: [id number]     Date/Time: 2023 11:05 AM  Patient PCP: Sole Cortez MD    Estimated discharge date:  > 48 hours  Barriers: Sodium improvement    Assessment / Plan:    Acute on chronic hyponatremia  Chronic Diastolic heart failure  PERLA on CKD 3  Anemia of chronic disease  Patient still with significant LE edema. Hypervolemia  Continue bumex  Fluid restriction  Nephrology following. Appreciate help. Hypoglycemia in setting of insulin-dependent diabetes mellitus  Off D10 infusion  Continue holding basal lantus today. Re assess in AM  Hypoglycemia protocol in place  Diabetic diet     Psychiatric disturbance  Bilateral upper extremity tremors  Continue PTA Abilify, Rexulti, BuSpar  Continue PTA as needed Xanax-decreased to 1 mg on admission - oversedation   Tremor evaluated by neurology at prior encounter with MRI brain demonstrating chronic small vessel ischemic disease without acute stroke-plan for outpatient follow-up in 6-8 weeks  -patient requesting home dose xanax, 2mg at HS     Chronic pain secondary to endometriosis with opioid dependence  Continue PTA morphine  Hold PTA oxycodone-oversedated at time of my exam and opiates are known to contribute to hyponatremia  Recommend attempts be made to wean opioids in outpatient setting particularly in setting of multiple psychoactive medications  Continue PTA gabapentin      Medical Decision Making:   I personally reviewed labs: CBC, BMP  I personally reviewed imaging: CT head  I personally reviewed EKG: Yes  Toxic drug monitoring: Monitor sodium level  Discussed case with: IDR, nurse        Code Status: Full  DVT Prophylaxis: Lovenox  GI Prophylaxis:    Subjective:     Chief Complaint / Reason for Physician Visit  Follow up hyponatremia, hypoglycemia, DM  Discussed with RN events overnight. Objective:     VITALS:   Last 24hrs VS reviewed since prior progress note.  Most

## 2023-08-05 NOTE — PROGRESS NOTES
0700: Bedside and Verbal shift change report given to Lisa Soares RN (oncoming nurse) by Gabriel Rowland RN (offgoing nurse). Report included the following information Nurse Handoff Report, Index, Intake/Output, MAR, and Recent Results. 1400: Patient requesting oxycodone to be added to her scheduled medications. RN update patient home medication list and notified MD. No new orders received. Patients blood sugar checked throughout shift. RN encouraged patient to turn and reposition q2 throughout shift. End of Shift Note    Bedside shift change report given to RN (oncoming nurse) by Silas Courtney RN (offgoing nurse). Report included the following information SBAR, Kardex, Intake/Output, MAR, and Recent Results    Shift worked:  8210-1819     Shift summary and any significant changes:     See above     Concerns for physician to address:  N/a     Zone phone for oncoming shift:          Activity:     Number times ambulated in hallways past shift: 0  Number of times OOB to chair past shift: 5    Cardiac:   Cardiac Monitoring: Yes           Access:  Current line(s): PIV     Genitourinary:   Urinary status: voiding    Respiratory:      Chronic home O2 use?: YES  Incentive spirometer at bedside: NO       GI:     Current diet:  ADULT DIET; Regular; 4 carb choices (60 gm/meal); 1500 ml  Passing flatus: YES  Tolerating current diet: YES       Pain Management:   Patient states pain is manageable on current regimen: NO    Skin:     Interventions: float heels, increase time out of bed, limit briefs, and nutritional support    Patient Safety:  Fall Score:    Interventions: bed/chair alarm, assistive device (walker, cane.  etc), gripper socks, pt to call before getting OOB, and stay with me (per policy)       Length of Stay:  Expected LOS: 4  Actual LOS: 2      Silas Courtney RN

## 2023-08-06 LAB
ANION GAP SERPL CALC-SCNC: 4 MMOL/L (ref 5–15)
ANION GAP SERPL CALC-SCNC: 5 MMOL/L (ref 5–15)
ANION GAP SERPL CALC-SCNC: 5 MMOL/L (ref 5–15)
BUN SERPL-MCNC: 19 MG/DL (ref 6–20)
BUN SERPL-MCNC: 19 MG/DL (ref 6–20)
BUN SERPL-MCNC: 21 MG/DL (ref 6–20)
BUN/CREAT SERPL: 17 (ref 12–20)
BUN/CREAT SERPL: 17 (ref 12–20)
BUN/CREAT SERPL: 18 (ref 12–20)
CALCIUM SERPL-MCNC: 8.4 MG/DL (ref 8.5–10.1)
CALCIUM SERPL-MCNC: 8.4 MG/DL (ref 8.5–10.1)
CALCIUM SERPL-MCNC: 8.5 MG/DL (ref 8.5–10.1)
CHLORIDE SERPL-SCNC: 87 MMOL/L (ref 97–108)
CHLORIDE SERPL-SCNC: 89 MMOL/L (ref 97–108)
CHLORIDE SERPL-SCNC: 90 MMOL/L (ref 97–108)
CO2 SERPL-SCNC: 31 MMOL/L (ref 21–32)
CO2 SERPL-SCNC: 31 MMOL/L (ref 21–32)
CO2 SERPL-SCNC: 32 MMOL/L (ref 21–32)
CREAT SERPL-MCNC: 1.12 MG/DL (ref 0.55–1.02)
CREAT SERPL-MCNC: 1.15 MG/DL (ref 0.55–1.02)
CREAT SERPL-MCNC: 1.18 MG/DL (ref 0.55–1.02)
GLUCOSE BLD STRIP.AUTO-MCNC: 206 MG/DL (ref 65–117)
GLUCOSE BLD STRIP.AUTO-MCNC: 261 MG/DL (ref 65–117)
GLUCOSE BLD STRIP.AUTO-MCNC: 297 MG/DL (ref 65–117)
GLUCOSE BLD STRIP.AUTO-MCNC: 305 MG/DL (ref 65–117)
GLUCOSE BLD STRIP.AUTO-MCNC: 355 MG/DL (ref 65–117)
GLUCOSE BLD STRIP.AUTO-MCNC: 359 MG/DL (ref 65–117)
GLUCOSE BLD STRIP.AUTO-MCNC: 361 MG/DL (ref 65–117)
GLUCOSE BLD STRIP.AUTO-MCNC: 361 MG/DL (ref 65–117)
GLUCOSE BLD STRIP.AUTO-MCNC: 369 MG/DL (ref 65–117)
GLUCOSE SERPL-MCNC: 240 MG/DL (ref 65–100)
GLUCOSE SERPL-MCNC: 287 MG/DL (ref 65–100)
GLUCOSE SERPL-MCNC: 318 MG/DL (ref 65–100)
POTASSIUM SERPL-SCNC: 3.7 MMOL/L (ref 3.5–5.1)
POTASSIUM SERPL-SCNC: 3.8 MMOL/L (ref 3.5–5.1)
POTASSIUM SERPL-SCNC: 3.9 MMOL/L (ref 3.5–5.1)
SERVICE CMNT-IMP: ABNORMAL
SODIUM SERPL-SCNC: 123 MMOL/L (ref 136–145)
SODIUM SERPL-SCNC: 125 MMOL/L (ref 136–145)
SODIUM SERPL-SCNC: 126 MMOL/L (ref 136–145)

## 2023-08-06 PROCEDURE — 82962 GLUCOSE BLOOD TEST: CPT

## 2023-08-06 PROCEDURE — 6370000000 HC RX 637 (ALT 250 FOR IP): Performed by: INTERNAL MEDICINE

## 2023-08-06 PROCEDURE — 6360000002 HC RX W HCPCS: Performed by: STUDENT IN AN ORGANIZED HEALTH CARE EDUCATION/TRAINING PROGRAM

## 2023-08-06 PROCEDURE — 2580000003 HC RX 258: Performed by: STUDENT IN AN ORGANIZED HEALTH CARE EDUCATION/TRAINING PROGRAM

## 2023-08-06 PROCEDURE — 2700000000 HC OXYGEN THERAPY PER DAY

## 2023-08-06 PROCEDURE — 1100000003 HC PRIVATE W/ TELEMETRY

## 2023-08-06 PROCEDURE — 36415 COLL VENOUS BLD VENIPUNCTURE: CPT

## 2023-08-06 PROCEDURE — 80048 BASIC METABOLIC PNL TOTAL CA: CPT

## 2023-08-06 PROCEDURE — 6370000000 HC RX 637 (ALT 250 FOR IP): Performed by: STUDENT IN AN ORGANIZED HEALTH CARE EDUCATION/TRAINING PROGRAM

## 2023-08-06 PROCEDURE — 2500000003 HC RX 250 WO HCPCS: Performed by: INTERNAL MEDICINE

## 2023-08-06 RX ORDER — INSULIN GLARGINE 100 [IU]/ML
20 INJECTION, SOLUTION SUBCUTANEOUS DAILY
Status: DISCONTINUED | OUTPATIENT
Start: 2023-08-06 | End: 2023-08-06

## 2023-08-06 RX ORDER — FUROSEMIDE 20 MG/1
1 TABLET ORAL DAILY
Status: ON HOLD | COMMUNITY
Start: 2023-07-03 | End: 2023-08-06 | Stop reason: ALTCHOICE

## 2023-08-06 RX ORDER — BUMETANIDE 0.25 MG/ML
1 INJECTION INTRAMUSCULAR; INTRAVENOUS 2 TIMES DAILY
Status: DISCONTINUED | OUTPATIENT
Start: 2023-08-06 | End: 2023-08-07

## 2023-08-06 RX ORDER — TORSEMIDE 10 MG/1
1 TABLET ORAL DAILY
Status: ON HOLD | COMMUNITY
Start: 2023-08-01 | End: 2023-08-13 | Stop reason: HOSPADM

## 2023-08-06 RX ORDER — INSULIN GLARGINE 100 [IU]/ML
25 INJECTION, SOLUTION SUBCUTANEOUS DAILY
Status: DISCONTINUED | OUTPATIENT
Start: 2023-08-06 | End: 2023-08-08

## 2023-08-06 RX ORDER — SPIRONOLACTONE 25 MG/1
1 TABLET ORAL DAILY
COMMUNITY
Start: 2023-07-14 | End: 2023-08-17

## 2023-08-06 RX ADMIN — BUSPIRONE HYDROCHLORIDE 15 MG: 7.5 TABLET ORAL at 08:46

## 2023-08-06 RX ADMIN — Medication 1 G: at 08:47

## 2023-08-06 RX ADMIN — BUMETANIDE 1 MG: 0.25 INJECTION, SOLUTION INTRAMUSCULAR; INTRAVENOUS at 20:42

## 2023-08-06 RX ADMIN — GABAPENTIN 300 MG: 300 CAPSULE ORAL at 15:22

## 2023-08-06 RX ADMIN — Medication 4 UNITS: at 22:13

## 2023-08-06 RX ADMIN — BUMETANIDE 1 MG: 0.25 INJECTION, SOLUTION INTRAMUSCULAR; INTRAVENOUS at 15:22

## 2023-08-06 RX ADMIN — BUSPIRONE HYDROCHLORIDE 15 MG: 7.5 TABLET ORAL at 20:42

## 2023-08-06 RX ADMIN — Medication 1 G: at 11:50

## 2023-08-06 RX ADMIN — ALPRAZOLAM 2 MG: 0.5 TABLET ORAL at 09:52

## 2023-08-06 RX ADMIN — MORPHINE SULFATE 90 MG: 30 TABLET, FILM COATED, EXTENDED RELEASE ORAL at 08:46

## 2023-08-06 RX ADMIN — ROSUVASTATIN CALCIUM 40 MG: 40 TABLET, COATED ORAL at 08:47

## 2023-08-06 RX ADMIN — Medication 8 UNITS: at 08:47

## 2023-08-06 RX ADMIN — ENOXAPARIN SODIUM 40 MG: 100 INJECTION SUBCUTANEOUS at 08:47

## 2023-08-06 RX ADMIN — ARIPIPRAZOLE 30 MG: 5 TABLET ORAL at 08:46

## 2023-08-06 RX ADMIN — INSULIN GLARGINE 25 UNITS: 100 INJECTION, SOLUTION SUBCUTANEOUS at 09:52

## 2023-08-06 RX ADMIN — MORPHINE SULFATE 90 MG: 30 TABLET, FILM COATED, EXTENDED RELEASE ORAL at 20:42

## 2023-08-06 RX ADMIN — FAMOTIDINE 40 MG: 20 TABLET, FILM COATED ORAL at 08:46

## 2023-08-06 RX ADMIN — ASPIRIN 81 MG: 81 TABLET, COATED ORAL at 08:47

## 2023-08-06 RX ADMIN — SODIUM CHLORIDE, PRESERVATIVE FREE 10 ML: 5 INJECTION INTRAVENOUS at 08:52

## 2023-08-06 RX ADMIN — GABAPENTIN 300 MG: 300 CAPSULE ORAL at 20:43

## 2023-08-06 RX ADMIN — BUMETANIDE 1 MG: 1 TABLET ORAL at 08:47

## 2023-08-06 RX ADMIN — Medication 2 UNITS: at 16:40

## 2023-08-06 RX ADMIN — SODIUM CHLORIDE, PRESERVATIVE FREE 10 ML: 5 INJECTION INTRAVENOUS at 20:43

## 2023-08-06 RX ADMIN — Medication 8 UNITS: at 11:50

## 2023-08-06 RX ADMIN — ALPRAZOLAM 2 MG: 0.5 TABLET ORAL at 20:42

## 2023-08-06 RX ADMIN — BREXPIPRAZOLE 1 MG: 1 TABLET ORAL at 08:46

## 2023-08-06 RX ADMIN — Medication 1 G: at 16:40

## 2023-08-06 RX ADMIN — GABAPENTIN 300 MG: 300 CAPSULE ORAL at 08:46

## 2023-08-06 ASSESSMENT — PAIN SCALES - GENERAL
PAINLEVEL_OUTOF10: 0
PAINLEVEL_OUTOF10: 0
PAINLEVEL_OUTOF10: 7
PAINLEVEL_OUTOF10: 0
PAINLEVEL_OUTOF10: 8
PAINLEVEL_OUTOF10: 5

## 2023-08-06 ASSESSMENT — PAIN DESCRIPTION - DESCRIPTORS
DESCRIPTORS: CRAMPING

## 2023-08-06 ASSESSMENT — PAIN DESCRIPTION - LOCATION
LOCATION: VAGINA;PELVIS;ABDOMEN
LOCATION: VAGINA;PELVIS;ABDOMEN
LOCATION: VAGINA

## 2023-08-06 ASSESSMENT — PAIN - FUNCTIONAL ASSESSMENT: PAIN_FUNCTIONAL_ASSESSMENT: ACTIVITIES ARE NOT PREVENTED

## 2023-08-06 NOTE — PROGRESS NOTES
..                            [id number] [facility] ADELIA Cannon  YOB: 1970          Assessment & Plan:     Hyponatremia (from poor solute intake, hypervolemia), acute on chronic  Critical hypoglycemia  Hypotension  Type 2 diabetes  CKD stage III(baseline creatinine 1.1-4.0)  Diastolic heart failure (EF 65% from 6/23/23)  Anemia of CKD        PLAN-  -hypervolemic hypo Na  -change bumex to 1mg IV BID  -strict I/Os  -daily Na levels       Subjective:   CC: Hyponatremia, PERLA/CKD3    HPI: seen and examined. Na 125 now. Massive LE edema present. SOB stable. On NC.   No cp, n/v/d reported    ROS: see above   Current Facility-Administered Medications   Medication Dose Route Frequency    insulin glargine (LANTUS) injection vial 25 Units  25 Units SubCUTAneous Daily    ALPRAZolam (XANAX) tablet 2 mg  2 mg Oral TID PRN    bumetanide (BUMEX) tablet 1 mg  1 mg Oral Daily    insulin lispro (HUMALOG) injection vial 0-8 Units  0-8 Units SubCUTAneous TID WC    insulin lispro (HUMALOG) injection vial 0-4 Units  0-4 Units SubCUTAneous Nightly    glucose chewable tablet 16 g  4 tablet Oral PRN    dextrose bolus 10% 125 mL  125 mL IntraVENous PRN    Or    dextrose bolus 10% 250 mL  250 mL IntraVENous PRN    glucagon injection 1 mg  1 mg SubCUTAneous PRN    dextrose 10 % infusion   IntraVENous Continuous PRN    albuterol sulfate HFA (PROVENTIL;VENTOLIN;PROAIR) 108 (90 Base) MCG/ACT inhaler 2 puff  2 puff Inhalation Q6H PRN    ARIPiprazole (ABILIFY) tablet 30 mg  30 mg Oral Daily    aspirin EC tablet 81 mg  81 mg Oral Daily    brexpiprazole (REXULTI) tablet 1 mg  1 mg Oral Daily    busPIRone (BUSPAR) tablet 15 mg  15 mg Oral BID    famotidine (PEPCID) tablet 40 mg  40 mg Oral Daily    gabapentin (NEURONTIN) capsule 300 mg  300 mg Oral TID    rosuvastatin (CRESTOR) tablet 40 mg  40 mg Oral Daily    sodium chloride flush 0.9 % injection 5-40 mL  5-40 mL IntraVENous 2 times per day    sodium 23* 22* 21* 19   PHOS  --   --   --   --  5.7* 4.5  --   --   --    MG  --  2.1 2.2  --   --   --   --   --   --    WBC 8.4  --  6.3  --  6.2  --   --   --   --    HGB 8.1*  --  7.3*  --  8.1*  --   --   --   --    HCT 25.8*  --  23.5*  --  27.4*  --   --   --   --    *  --  397  --  421*  --   --   --   --     < > = values in this interval not displayed. No results for input(s): INR, APTT in the last 72 hours. Invalid input(s): PTP  Needs: urine analysis, urine sodium, protein and creatinine  Lab Results   Component Value Date/Time    KWAME 26 03/06/2023 05:02 AM         Discussed with:  pt    : Forest Mc MD  8/6/2023        [facility]:  [email or web address]  [facility] office:  [address], [address] [place] [address]  Phone - [phone]  Fax - [phone]

## 2023-08-06 NOTE — PROGRESS NOTES
0700: Bedside and Verbal shift change report given to Buster Lord RN (oncoming nurse) by Tsering Melchor RN (offgoing nurse). Report included the following information Nurse Handoff Report, Index, Intake/Output, MAR, and Recent Results. 0830: Patients . MD notified. Patient now ACHS checks instead of q2 per MD.     2779: PRN alprazolam given for anxiety     1200: Blood drawn and sent to lab    1520: RN has educated patient on strict 1500 mL fluid restriction, strict I&Os, and daily weights. Patients current fluid intake is at 1200 mL for since 7am and has verbal stated she understands she only has 300 ml left until 7am tomorrow     1615: BED ALARM REFUSAL    The patient refused the bed alarm. Education regarding measures to prevent fall and risk for serious injury related to fall were provided to the patient by Jassi Hernandez, The  patient verbalized understanding. Informed refusal form was signed by the patient. (See signed informed refusal form in chart). End of Shift Note    Bedside shift change report given to RN (oncoming nurse) by Leah Martinez RN (offgoing nurse). Report included the following information SBAR, Kardex, Intake/Output, MAR, and Recent Results    Shift worked:  1698-6370     Shift summary and any significant changes:     See above     Concerns for physician to address:  N/a     Zone phone for oncoming shift:          Activity:     Number times ambulated in hallways past shift: 0  Number of times OOB to chair past shift: 5    Cardiac:   Cardiac Monitoring: Yes           Access:  Current line(s): PIV     Genitourinary:   Urinary status: voiding    Respiratory:      Chronic home O2 use?: YES  Incentive spirometer at bedside: NO       GI:     Current diet:  ADULT DIET;  Regular; 4 carb choices (60 gm/meal); 1500 ml  Passing flatus: YES  Tolerating current diet: YES       Pain Management:   Patient states pain is manageable on current regimen: YES    Skin:     Interventions: specialty bed, float heels,

## 2023-08-06 NOTE — PROGRESS NOTES
Hospitalist Progress Note    NAME:   Jessica Will   : 1970   MRN: [id number]     Date/Time: 2023 9:59 AM  Patient PCP: Brian Jacques MD    Estimated discharge date:  > 48 hours  Barriers: Sodium improvement    Assessment / Plan:    Acute on chronic hyponatremia  Chronic Diastolic heart failure  PERLA on CKD 3  Anemia of chronic disease  Patient still with significant LE edema. Hypervolemia  Continue bumex   Fluid restriction  Nephrology following. Appreciate help. Na 123 today. Leg edema looks better. Adjusting bumex per renal     Hypoglycemia in setting of insulin-dependent diabetes mellitus  Off D10 infusion  Hypoglycemia protocol in place  Diabetic diet  Restart lantus 25 daily. Titrate slowly     Psychiatric disturbance  Bilateral upper extremity tremors  Continue PTA Abilify, Rexulti, BuSpar  Continue PTA as needed Xanax-decreased to 1 mg on admission - oversedation   Tremor evaluated by neurology at prior encounter with MRI brain demonstrating chronic small vessel ischemic disease without acute stroke-plan for outpatient follow-up in 6-8 weeks  Continue xanax prn (PTA dose)     Chronic pain secondary to endometriosis with opioid dependence  Continue PTA morphine  Hold PTA oxycodone-oversedated at time of my exam and opiates are known to contribute to hyponatremia  Recommend attempts be made to wean opioids in outpatient setting particularly in setting of multiple psychoactive medications  Continue PTA gabapentin      Medical Decision Making:   I personally reviewed labs: CBC, BMP  I personally reviewed imaging:   I personally reviewed EKG:   Toxic drug monitoring: Monitor sodium level  Discussed case with: IDR, nurse        Code Status: Full  DVT Prophylaxis: Lovenox  GI Prophylaxis:    Subjective:     Chief Complaint / Reason for Physician Visit  Follow up hyponatremia, hypoglycemia, DM  Discussed with RN events overnight.    Leg swelling better    Objective:     VITALS:   Last 24hrs

## 2023-08-07 ENCOUNTER — TELEPHONE (OUTPATIENT)
Age: 53
End: 2023-08-07

## 2023-08-07 LAB
ALBUMIN SERPL-MCNC: 3.2 G/DL (ref 3.5–5)
ANION GAP SERPL CALC-SCNC: 5 MMOL/L (ref 5–15)
BUN SERPL-MCNC: 20 MG/DL (ref 6–20)
BUN/CREAT SERPL: 18 (ref 12–20)
CALCIUM SERPL-MCNC: 8.6 MG/DL (ref 8.5–10.1)
CHLORIDE SERPL-SCNC: 89 MMOL/L (ref 97–108)
CO2 SERPL-SCNC: 31 MMOL/L (ref 21–32)
CREAT SERPL-MCNC: 1.11 MG/DL (ref 0.55–1.02)
GLUCOSE BLD STRIP.AUTO-MCNC: 118 MG/DL (ref 65–117)
GLUCOSE BLD STRIP.AUTO-MCNC: 176 MG/DL (ref 65–117)
GLUCOSE BLD STRIP.AUTO-MCNC: 187 MG/DL (ref 65–117)
GLUCOSE BLD STRIP.AUTO-MCNC: 306 MG/DL (ref 65–117)
GLUCOSE BLD STRIP.AUTO-MCNC: 308 MG/DL (ref 65–117)
GLUCOSE BLD STRIP.AUTO-MCNC: 316 MG/DL (ref 65–117)
GLUCOSE BLD STRIP.AUTO-MCNC: 322 MG/DL (ref 65–117)
GLUCOSE BLD STRIP.AUTO-MCNC: 341 MG/DL (ref 65–117)
GLUCOSE BLD STRIP.AUTO-MCNC: 468 MG/DL (ref 65–117)
GLUCOSE BLD STRIP.AUTO-MCNC: 468 MG/DL (ref 65–117)
GLUCOSE SERPL-MCNC: 136 MG/DL (ref 65–100)
MAGNESIUM SERPL-MCNC: 1.8 MG/DL (ref 1.6–2.4)
PHOSPHATE SERPL-MCNC: 3.8 MG/DL (ref 2.6–4.7)
POTASSIUM SERPL-SCNC: 3.6 MMOL/L (ref 3.5–5.1)
SERVICE CMNT-IMP: ABNORMAL
SODIUM SERPL-SCNC: 125 MMOL/L (ref 136–145)

## 2023-08-07 PROCEDURE — 2500000003 HC RX 250 WO HCPCS: Performed by: INTERNAL MEDICINE

## 2023-08-07 PROCEDURE — 82962 GLUCOSE BLOOD TEST: CPT

## 2023-08-07 PROCEDURE — 2580000003 HC RX 258: Performed by: STUDENT IN AN ORGANIZED HEALTH CARE EDUCATION/TRAINING PROGRAM

## 2023-08-07 PROCEDURE — 6360000002 HC RX W HCPCS: Performed by: STUDENT IN AN ORGANIZED HEALTH CARE EDUCATION/TRAINING PROGRAM

## 2023-08-07 PROCEDURE — 6370000000 HC RX 637 (ALT 250 FOR IP): Performed by: INTERNAL MEDICINE

## 2023-08-07 PROCEDURE — 6370000000 HC RX 637 (ALT 250 FOR IP): Performed by: STUDENT IN AN ORGANIZED HEALTH CARE EDUCATION/TRAINING PROGRAM

## 2023-08-07 PROCEDURE — 2700000000 HC OXYGEN THERAPY PER DAY

## 2023-08-07 PROCEDURE — 1100000003 HC PRIVATE W/ TELEMETRY

## 2023-08-07 PROCEDURE — 80069 RENAL FUNCTION PANEL: CPT

## 2023-08-07 PROCEDURE — 36415 COLL VENOUS BLD VENIPUNCTURE: CPT

## 2023-08-07 PROCEDURE — 83735 ASSAY OF MAGNESIUM: CPT

## 2023-08-07 RX ORDER — POTASSIUM CHLORIDE 20 MEQ/1
40 TABLET, EXTENDED RELEASE ORAL ONCE
Status: COMPLETED | OUTPATIENT
Start: 2023-08-07 | End: 2023-08-07

## 2023-08-07 RX ORDER — INSULIN LISPRO 100 [IU]/ML
1 INJECTION, SOLUTION INTRAVENOUS; SUBCUTANEOUS
Status: DISPENSED | OUTPATIENT
Start: 2023-08-07 | End: 2023-08-08

## 2023-08-07 RX ORDER — BUMETANIDE 0.25 MG/ML
2 INJECTION INTRAMUSCULAR; INTRAVENOUS 2 TIMES DAILY
Status: DISCONTINUED | OUTPATIENT
Start: 2023-08-07 | End: 2023-08-08

## 2023-08-07 RX ORDER — INSULIN GLARGINE 100 [IU]/ML
18 INJECTION, SOLUTION SUBCUTANEOUS NIGHTLY
Status: DISCONTINUED | OUTPATIENT
Start: 2023-08-07 | End: 2023-08-07

## 2023-08-07 RX ORDER — BUMETANIDE 0.25 MG/ML
2 INJECTION INTRAMUSCULAR; INTRAVENOUS ONCE
Status: COMPLETED | OUTPATIENT
Start: 2023-08-07 | End: 2023-08-07

## 2023-08-07 RX ORDER — OXYCODONE HYDROCHLORIDE 5 MG/1
5 TABLET ORAL EVERY 4 HOURS PRN
Status: DISCONTINUED | OUTPATIENT
Start: 2023-08-07 | End: 2023-08-13 | Stop reason: HOSPADM

## 2023-08-07 RX ADMIN — ARIPIPRAZOLE 30 MG: 5 TABLET ORAL at 08:38

## 2023-08-07 RX ADMIN — Medication 6 UNITS: at 12:34

## 2023-08-07 RX ADMIN — MORPHINE SULFATE 90 MG: 30 TABLET, FILM COATED, EXTENDED RELEASE ORAL at 08:38

## 2023-08-07 RX ADMIN — Medication 6 UNITS: at 08:37

## 2023-08-07 RX ADMIN — ROSUVASTATIN CALCIUM 40 MG: 40 TABLET, COATED ORAL at 08:39

## 2023-08-07 RX ADMIN — FAMOTIDINE 40 MG: 20 TABLET, FILM COATED ORAL at 08:41

## 2023-08-07 RX ADMIN — ASPIRIN 81 MG: 81 TABLET, COATED ORAL at 08:38

## 2023-08-07 RX ADMIN — BUSPIRONE HYDROCHLORIDE 15 MG: 7.5 TABLET ORAL at 08:38

## 2023-08-07 RX ADMIN — ENOXAPARIN SODIUM 40 MG: 100 INJECTION SUBCUTANEOUS at 08:37

## 2023-08-07 RX ADMIN — GABAPENTIN 300 MG: 300 CAPSULE ORAL at 21:11

## 2023-08-07 RX ADMIN — BUSPIRONE HYDROCHLORIDE 15 MG: 7.5 TABLET ORAL at 21:11

## 2023-08-07 RX ADMIN — Medication 4 UNITS: at 21:12

## 2023-08-07 RX ADMIN — Medication 6 UNITS: at 17:30

## 2023-08-07 RX ADMIN — GABAPENTIN 300 MG: 300 CAPSULE ORAL at 08:38

## 2023-08-07 RX ADMIN — INSULIN GLARGINE 25 UNITS: 100 INJECTION, SOLUTION SUBCUTANEOUS at 08:37

## 2023-08-07 RX ADMIN — MORPHINE SULFATE 90 MG: 30 TABLET, FILM COATED, EXTENDED RELEASE ORAL at 21:11

## 2023-08-07 RX ADMIN — POLYETHYLENE GLYCOL 3350 17 G: 17 POWDER, FOR SOLUTION ORAL at 17:09

## 2023-08-07 RX ADMIN — ALPRAZOLAM 2 MG: 0.5 TABLET ORAL at 08:38

## 2023-08-07 RX ADMIN — SODIUM CHLORIDE, PRESERVATIVE FREE 10 ML: 5 INJECTION INTRAVENOUS at 08:36

## 2023-08-07 RX ADMIN — Medication 1 G: at 12:34

## 2023-08-07 RX ADMIN — BREXPIPRAZOLE 1 MG: 1 TABLET ORAL at 08:38

## 2023-08-07 RX ADMIN — ALPRAZOLAM 2 MG: 0.5 TABLET ORAL at 21:12

## 2023-08-07 RX ADMIN — Medication 1 G: at 17:09

## 2023-08-07 RX ADMIN — POTASSIUM CHLORIDE 40 MEQ: 1500 TABLET, EXTENDED RELEASE ORAL at 09:57

## 2023-08-07 RX ADMIN — GABAPENTIN 300 MG: 300 CAPSULE ORAL at 13:07

## 2023-08-07 RX ADMIN — BUMETANIDE 2 MG: 0.25 INJECTION, SOLUTION INTRAMUSCULAR; INTRAVENOUS at 13:06

## 2023-08-07 RX ADMIN — OXYCODONE HYDROCHLORIDE 5 MG: 5 TABLET ORAL at 16:16

## 2023-08-07 RX ADMIN — BUMETANIDE 2 MG: 0.25 INJECTION, SOLUTION INTRAMUSCULAR; INTRAVENOUS at 21:12

## 2023-08-07 RX ADMIN — Medication 1 G: at 08:38

## 2023-08-07 ASSESSMENT — PAIN DESCRIPTION - LOCATION: LOCATION: ABDOMEN

## 2023-08-07 ASSESSMENT — PAIN SCALES - GENERAL
PAINLEVEL_OUTOF10: 4
PAINLEVEL_OUTOF10: 8

## 2023-08-07 ASSESSMENT — PAIN DESCRIPTION - DESCRIPTORS: DESCRIPTORS: ACHING

## 2023-08-07 ASSESSMENT — PAIN DESCRIPTION - ORIENTATION: ORIENTATION: LEFT;RIGHT;LOWER

## 2023-08-07 ASSESSMENT — PAIN DESCRIPTION - FREQUENCY: FREQUENCY: INTERMITTENT

## 2023-08-07 ASSESSMENT — PAIN DESCRIPTION - PAIN TYPE: TYPE: CHRONIC PAIN

## 2023-08-07 NOTE — PROGRESS NOTES
Hospitalist Progress Note    NAME:   Angelita Fox   : 1970   MRN: [id number]     Date/Time: 2023 8:46 AM  Patient PCP: Dustin Zamora MD    Estimated discharge date:1-2 days  Barriers: Cardiology and nephrology clearance, will likely need RHC to assess volume status      Assessment / Plan:  Acute on chronic hyponatremia  Chronic Diastolic heart failure  PERLA on CKD 3  Anemia of chronic disease  Patient still with significant LE edema. Hypervolemia  Continue bumex   Fluid restriction  Nephrology following. Appreciate help. Na 123 today. Leg edema looks better. Adjusting bumex per renal  : Patient has a very delicate fluid and sodium balance. Appreciate nephrology input. Patient started on 2 mg of Bumex twice daily. Also involving cardiology to assess fluid/volume status and patient may likely need right heart cath for that. Hypoglycemia in setting of insulin-dependent diabetes mellitus  Off D10 infusion  Hypoglycemia protocol in place  Diabetic diet  Restart lantus 25 daily. Titrate slowly  : Glucose is ranging between 187 and 341. She is on 25 units of Lantus daily. We will titrate up slowly.      Psychiatric disturbance  Bilateral upper extremity tremors  Continue PTA Abilify, Rexulti, BuSpar  Continue PTA as needed Xanax-decreased to 1 mg on admission - oversedation   Tremor evaluated by neurology at prior encounter with MRI brain demonstrating chronic small vessel ischemic disease without acute stroke-plan for outpatient follow-up in 6-8 weeks  Continue xanax prn (PTA dose)     Chronic pain secondary to endometriosis with opioid dependence  Continue PTA morphine  Hold PTA oxycodone-oversedated at time of my exam and opiates are known to contribute to hyponatremia  Recommend attempts be made to wean opioids in outpatient setting particularly in setting of multiple psychoactive medications  Continue PTA gabapentin        Medical Decision Making:   I personally reviewed labs:

## 2023-08-07 NOTE — CONSULTS
IP Cardiology Consult       Date of consult:  08/07/23  Date of admission: 8/3/2023  Primary Cardiologist: Dr Frances Antoine, Dr Kendall Reno   Physician Requesting consult: Dr Tia Pinedo:    Hyponatremia   Hypervolemia  Pedal edema (also contributed by venous insufficiency)   CKD  Diastolic HF, Echo in 5/8115 showed normal LVEF, Normal RV function       Problem list:      Echo 7/20 showed EF 60-65%, no significant valvular abnormality and PFO with shunting w/ Valsalva. S/P PFO closure      Venous insufficiency/s/p venous ablation of both GSV and the R anterior accessory vein     HTN   DM2   Hyperlipidemia   Chronic back pain   COPD? Continued tobacco use   Depression/anxiety   Hx of hyponatremia with SSRI's and metolazone in the past   CKD   Obesity       Recommendations:    Cont bumex per renal. Some of the pedal edema which is chronic also probably contributed by venous insufficiency. RHC for assessment of volume status, discussed risk and benefit, she agreed to proceed   Keep NPO post midnight         [x]        High complexity decision making was performed      CC / Reason for consult: RHC    History of the presenting illness:  Jesus Neves is a 47 y.o. female who is admitted for hyponatremia with hypervolemia, chronic pedal edema, chronic Dyspnea unchanged, reports drinking lot of fluids. Renal following. Cardiology consulted for consideration of RHC.      Past Medical History:   Diagnosis Date    Achilles tendon rupture     along with torn right patellar/femoral ligament    Arthritis     rt. foot    Chronic kidney disease     Chronic pain     abdominal pain    Diabetes (HCC)     IDDM on insulin pump and sensor    DM type 1 (diabetes mellitus, type 1) ([address])     age 25    Endometriosis     s/p ex-lap 4x    Gastric ulcer     GERD (gastroesophageal reflux disease)     Herpes     Other and unspecified hyperlipidemia     Panic attacks     Psychiatric disorder     anxiety, panic attacks    PTSD Jade PCT    POCT Glucose    Collection Time: 08/05/23  7:43 AM   Result Value Ref Range    POC Glucose 194 (H) 65 - 117 mg/dL    Performed by: Cornelius He RN    POCT Glucose    Collection Time: 08/05/23 10:26 AM   Result Value Ref Range    POC Glucose 191 (H) 65 - 117 mg/dL    Performed by: Cornelius He RN    Basic Metabolic Panel    Collection Time: 08/05/23 11:55 AM   Result Value Ref Range    Sodium 122 (L) 136 - 145 mmol/L    Potassium 3.7 3.5 - 5.1 mmol/L    Chloride 87 (L) 97 - 108 mmol/L    CO2 30 21 - 32 mmol/L    Anion Gap 5 5 - 15 mmol/L    Glucose 200 (H) 65 - 100 mg/dL    BUN 23 (H) 6 - 20 MG/DL    Creatinine 1.14 (H) 0.55 - 1.02 MG/DL    Bun/Cre Ratio 20 12 - 20      Est, Glom Filt Rate 58 (L) >60 ml/min/1.73m2    Calcium 8.4 (L) 8.5 - 10.1 MG/DL   Renal Function Panel    Collection Time: 08/05/23 11:55 AM   Result Value Ref Range    Sodium 123 (L) 136 - 145 mmol/L    Potassium 3.7 3.5 - 5.1 mmol/L    Chloride 87 (L) 97 - 108 mmol/L    CO2 30 21 - 32 mmol/L    Anion Gap 6 5 - 15 mmol/L    Glucose 199 (H) 65 - 100 mg/dL    BUN 23 (H) 6 - 20 MG/DL    Creatinine 1.21 (H) 0.55 - 1.02 MG/DL    Bun/Cre Ratio 19 12 - 20      Est, Glom Filt Rate 54 (L) >60 ml/min/1.73m2    Calcium 8.2 (L) 8.5 - 10.1 MG/DL    Phosphorus 4.5 2.6 - 4.7 MG/DL    Albumin 3.0 (L) 3.5 - 5.0 g/dL   POCT Glucose    Collection Time: 08/05/23  2:54 PM   Result Value Ref Range    POC Glucose 290 (H) 65 - 117 mg/dL    Performed by: Cornelius He RN    POCT Glucose    Collection Time: 08/05/23  3:57 PM   Result Value Ref Range    POC Glucose 281 (H) 65 - 117 mg/dL    Performed by: Cornelius He RN    POCT Glucose    Collection Time: 08/05/23  4:44 PM   Result Value Ref Range    POC Glucose 265 (H) 65 - 117 mg/dL    Performed by: Cornelius He RN    Basic Metabolic Panel    Collection Time: 08/05/23  5:49 PM   Result Value Ref Range    Sodium 122 (L) 136 - 145 mmol/L    Potassium 3.9 3.5 - 5.1 mmol/L    Chloride 87 (L) 97 - 108 mmol/L

## 2023-08-07 NOTE — TELEPHONE ENCOUNTER
Pt LVM stating she is currently in the hospital for low sodium and low blood glucose. Pt states her sugar dropped to 40 and had to be taking to [facility]. She states she is upset because she is not being given Lantus and her sugars are running in the 400's. Called and informed pt that Dr DEVINE [facility] is out of the office this week returning on 08/14/2023. Pt states she would like for the message to be passed to Dr Sunni Mccallum as she is familiar with him. She is asking can Dr Sunni Mccallum call her to discuss the hospital giving her the 30 units of Lantus she is supposed to receive. Informed pt message will be forwarded to Dr Sunni Mccallum.

## 2023-08-07 NOTE — TELEPHONE ENCOUNTER
I spoke with pt and I recommended she ask the RN to call the hospitalitis and as that they restart her home Lantus since her BG are in the 300s. Pt voices understanding and agreement with the plan.

## 2023-08-07 NOTE — PROGRESS NOTES
Bedside shift report received from Roxie Fernandez, [address]. Report included the following information SBAR, Kardex, MAR, and Recent Results. This RN verbalized understanding of plan of care with opportunity for clarification and questions. Care of patient assumed at this time. Dual skin check performed at this time. 0397: Nephrology MD at bedside; Patient updated on plan of care including need for cardiac consult and increase to Bumex dose from 1mg to 2mg. Patient agreeable at this time. [phone]: MD notified of patient reporting 8/10 abdominal cramping, related to endometriosis. Patient states that she takes 5mg oxycodone PRN at home. Orders received to continue home regimen at this time, see MAR. Bedside shift report given to  ____, RN. Report included the following information SBAR, Kardex, MAR, and Recent Results. Oncoming RN verbalized understanding of plan of care with opportunity for clarification and questions. Dual skin check performed at this time.

## 2023-08-07 NOTE — CARE COORDINATION
Transition of Care Plan:    RUR: 29% (high RUR, readmission)  Prior Level of Functioning: Independent  Disposition: Home with follow ups. If SNF or IPR: Date FOC offered: N/A  Date FOC received: N/A  Accepting facility: N/A  Date authorization started with reference number: N/A  Date authorization received and expires: N/A  Follow up appointments: PCP/specialists if needed. DME needed: None. Patient has home oxygen 3L O2 NC tanks and concentrator through [employer]. Transportation at discharge: Family. IM/IMM Medicare/ letter given: 2nd IM needed prior to discharge. Is patient a  and connected with [facility]? No.   If yes, was [employer] transfer form completed and [facility] notified? N/A  Caregiver Contact: Lisa Chandler  bf - [phone]  Discharge Caregiver contacted prior to discharge? Patient to contact. Care Conference needed?  No.  Barriers to discharge: BG/Na+ stability, nephro clearance, cardiac consult and possible heart cath      GUTIERREZ CallahanN, RN    Care Management  [phone]

## 2023-08-08 LAB
ANION GAP SERPL CALC-SCNC: 8 MMOL/L (ref 5–15)
BUN SERPL-MCNC: 19 MG/DL (ref 6–20)
BUN/CREAT SERPL: 17 (ref 12–20)
CALCIUM SERPL-MCNC: 6.9 MG/DL (ref 8.5–10.1)
CHLORIDE SERPL-SCNC: 101 MMOL/L (ref 97–108)
CO2 SERPL-SCNC: 23 MMOL/L (ref 21–32)
CREAT SERPL-MCNC: 1.15 MG/DL (ref 0.55–1.02)
ERYTHROCYTE [DISTWIDTH] IN BLOOD BY AUTOMATED COUNT: 24.3 % (ref 11.5–14.5)
GLUCOSE BLD STRIP.AUTO-MCNC: 354 MG/DL (ref 65–117)
GLUCOSE BLD STRIP.AUTO-MCNC: 363 MG/DL (ref 65–117)
GLUCOSE BLD STRIP.AUTO-MCNC: 492 MG/DL (ref 65–117)
GLUCOSE BLD STRIP.AUTO-MCNC: 511 MG/DL (ref 65–117)
GLUCOSE SERPL-MCNC: 355 MG/DL (ref 65–100)
HCT VFR BLD AUTO: 24.6 % (ref 35–47)
HGB BLD-MCNC: 7.4 G/DL (ref 11.5–16)
MAGNESIUM SERPL-MCNC: 1.4 MG/DL (ref 1.6–2.4)
MCH RBC QN AUTO: 21.4 PG (ref 26–34)
MCHC RBC AUTO-ENTMCNC: 30.1 G/DL (ref 30–36.5)
MCV RBC AUTO: 71.3 FL (ref 80–99)
NRBC # BLD: 0 K/UL (ref 0–0.01)
NRBC BLD-RTO: 0 PER 100 WBC
PLATELET # BLD AUTO: 367 K/UL (ref 150–400)
PMV BLD AUTO: 9.4 FL (ref 8.9–12.9)
POTASSIUM SERPL-SCNC: 3.4 MMOL/L (ref 3.5–5.1)
RBC # BLD AUTO: 3.45 M/UL (ref 3.8–5.2)
SERVICE CMNT-IMP: ABNORMAL
SODIUM SERPL-SCNC: 132 MMOL/L (ref 136–145)
WBC # BLD AUTO: 6.8 K/UL (ref 3.6–11)

## 2023-08-08 PROCEDURE — 2500000003 HC RX 250 WO HCPCS: Performed by: INTERNAL MEDICINE

## 2023-08-08 PROCEDURE — 4A023N6 MEASUREMENT OF CARDIAC SAMPLING AND PRESSURE, RIGHT HEART, PERCUTANEOUS APPROACH: ICD-10-PCS | Performed by: STUDENT IN AN ORGANIZED HEALTH CARE EDUCATION/TRAINING PROGRAM

## 2023-08-08 PROCEDURE — C1751 CATH, INF, PER/CENT/MIDLINE: HCPCS | Performed by: STUDENT IN AN ORGANIZED HEALTH CARE EDUCATION/TRAINING PROGRAM

## 2023-08-08 PROCEDURE — 2580000003 HC RX 258: Performed by: STUDENT IN AN ORGANIZED HEALTH CARE EDUCATION/TRAINING PROGRAM

## 2023-08-08 PROCEDURE — 85027 COMPLETE CBC AUTOMATED: CPT

## 2023-08-08 PROCEDURE — 6370000000 HC RX 637 (ALT 250 FOR IP): Performed by: STUDENT IN AN ORGANIZED HEALTH CARE EDUCATION/TRAINING PROGRAM

## 2023-08-08 PROCEDURE — 6370000000 HC RX 637 (ALT 250 FOR IP): Performed by: INTERNAL MEDICINE

## 2023-08-08 PROCEDURE — 80048 BASIC METABOLIC PNL TOTAL CA: CPT

## 2023-08-08 PROCEDURE — C1894 INTRO/SHEATH, NON-LASER: HCPCS | Performed by: STUDENT IN AN ORGANIZED HEALTH CARE EDUCATION/TRAINING PROGRAM

## 2023-08-08 PROCEDURE — 1100000003 HC PRIVATE W/ TELEMETRY

## 2023-08-08 PROCEDURE — 2700000000 HC OXYGEN THERAPY PER DAY

## 2023-08-08 PROCEDURE — 93451 RIGHT HEART CATH: CPT | Performed by: STUDENT IN AN ORGANIZED HEALTH CARE EDUCATION/TRAINING PROGRAM

## 2023-08-08 PROCEDURE — 36415 COLL VENOUS BLD VENIPUNCTURE: CPT

## 2023-08-08 PROCEDURE — 6360000002 HC RX W HCPCS: Performed by: STUDENT IN AN ORGANIZED HEALTH CARE EDUCATION/TRAINING PROGRAM

## 2023-08-08 PROCEDURE — 99152 MOD SED SAME PHYS/QHP 5/>YRS: CPT | Performed by: STUDENT IN AN ORGANIZED HEALTH CARE EDUCATION/TRAINING PROGRAM

## 2023-08-08 PROCEDURE — 2709999900 HC NON-CHARGEABLE SUPPLY: Performed by: STUDENT IN AN ORGANIZED HEALTH CARE EDUCATION/TRAINING PROGRAM

## 2023-08-08 PROCEDURE — 83735 ASSAY OF MAGNESIUM: CPT

## 2023-08-08 PROCEDURE — 82962 GLUCOSE BLOOD TEST: CPT

## 2023-08-08 RX ORDER — INSULIN LISPRO 100 [IU]/ML
5 INJECTION, SOLUTION INTRAVENOUS; SUBCUTANEOUS
Status: DISCONTINUED | OUTPATIENT
Start: 2023-08-08 | End: 2023-08-10

## 2023-08-08 RX ORDER — HEPARIN SODIUM 10000 [USP'U]/ML
INJECTION, SOLUTION INTRAVENOUS; SUBCUTANEOUS PRN
Status: DISCONTINUED | OUTPATIENT
Start: 2023-08-08 | End: 2023-08-08 | Stop reason: HOSPADM

## 2023-08-08 RX ORDER — INSULIN GLARGINE 100 [IU]/ML
30 INJECTION, SOLUTION SUBCUTANEOUS DAILY
Status: DISCONTINUED | OUTPATIENT
Start: 2023-08-09 | End: 2023-08-09

## 2023-08-08 RX ORDER — BUMETANIDE 0.25 MG/ML
2 INJECTION INTRAMUSCULAR; INTRAVENOUS EVERY 8 HOURS
Status: DISCONTINUED | OUTPATIENT
Start: 2023-08-08 | End: 2023-08-08

## 2023-08-08 RX ADMIN — ASPIRIN 81 MG: 81 TABLET, COATED ORAL at 08:56

## 2023-08-08 RX ADMIN — BREXPIPRAZOLE 1 MG: 1 TABLET ORAL at 09:10

## 2023-08-08 RX ADMIN — Medication 1 G: at 17:48

## 2023-08-08 RX ADMIN — Medication 5 UNITS: at 17:49

## 2023-08-08 RX ADMIN — OXYCODONE HYDROCHLORIDE 5 MG: 5 TABLET ORAL at 19:35

## 2023-08-08 RX ADMIN — BUMETANIDE 0.75 MG/HR: 0.25 INJECTION INTRAMUSCULAR; INTRAVENOUS at 20:59

## 2023-08-08 RX ADMIN — ARIPIPRAZOLE 30 MG: 5 TABLET ORAL at 09:10

## 2023-08-08 RX ADMIN — BUSPIRONE HYDROCHLORIDE 15 MG: 7.5 TABLET ORAL at 21:01

## 2023-08-08 RX ADMIN — GABAPENTIN 300 MG: 300 CAPSULE ORAL at 08:57

## 2023-08-08 RX ADMIN — GABAPENTIN 300 MG: 300 CAPSULE ORAL at 14:33

## 2023-08-08 RX ADMIN — BUSPIRONE HYDROCHLORIDE 15 MG: 7.5 TABLET ORAL at 08:57

## 2023-08-08 RX ADMIN — Medication 1 G: at 11:45

## 2023-08-08 RX ADMIN — MORPHINE SULFATE 90 MG: 30 TABLET, FILM COATED, EXTENDED RELEASE ORAL at 21:01

## 2023-08-08 RX ADMIN — SODIUM CHLORIDE, PRESERVATIVE FREE 10 ML: 5 INJECTION INTRAVENOUS at 08:59

## 2023-08-08 RX ADMIN — Medication 4 UNITS: at 23:10

## 2023-08-08 RX ADMIN — Medication 8 UNITS: at 08:58

## 2023-08-08 RX ADMIN — Medication 8 UNITS: at 17:48

## 2023-08-08 RX ADMIN — Medication 8 UNITS: at 11:46

## 2023-08-08 RX ADMIN — ALPRAZOLAM 2 MG: 0.5 TABLET ORAL at 09:10

## 2023-08-08 RX ADMIN — Medication 1 G: at 08:57

## 2023-08-08 RX ADMIN — BUMETANIDE 2 MG: 0.25 INJECTION, SOLUTION INTRAMUSCULAR; INTRAVENOUS at 08:56

## 2023-08-08 RX ADMIN — FAMOTIDINE 40 MG: 20 TABLET, FILM COATED ORAL at 08:56

## 2023-08-08 RX ADMIN — INSULIN GLARGINE 25 UNITS: 100 INJECTION, SOLUTION SUBCUTANEOUS at 08:59

## 2023-08-08 RX ADMIN — ROSUVASTATIN CALCIUM 40 MG: 40 TABLET, COATED ORAL at 08:56

## 2023-08-08 RX ADMIN — ALPRAZOLAM 2 MG: 0.5 TABLET ORAL at 21:05

## 2023-08-08 RX ADMIN — Medication 5 UNITS: at 11:46

## 2023-08-08 RX ADMIN — OXYCODONE HYDROCHLORIDE 5 MG: 5 TABLET ORAL at 14:33

## 2023-08-08 RX ADMIN — GABAPENTIN 300 MG: 300 CAPSULE ORAL at 21:02

## 2023-08-08 RX ADMIN — MORPHINE SULFATE 90 MG: 30 TABLET, FILM COATED, EXTENDED RELEASE ORAL at 08:57

## 2023-08-08 RX ADMIN — BUMETANIDE 0.75 MG/HR: 0.25 INJECTION INTRAMUSCULAR; INTRAVENOUS at 12:32

## 2023-08-08 RX ADMIN — ENOXAPARIN SODIUM 40 MG: 100 INJECTION SUBCUTANEOUS at 08:57

## 2023-08-08 ASSESSMENT — PAIN SCALES - GENERAL
PAINLEVEL_OUTOF10: 0
PAINLEVEL_OUTOF10: 7
PAINLEVEL_OUTOF10: 7
PAINLEVEL_OUTOF10: 0
PAINLEVEL_OUTOF10: 7
PAINLEVEL_OUTOF10: 7
PAINLEVEL_OUTOF10: 0

## 2023-08-08 ASSESSMENT — PAIN DESCRIPTION - LOCATION
LOCATION: ABDOMEN

## 2023-08-08 ASSESSMENT — PAIN DESCRIPTION - DESCRIPTORS
DESCRIPTORS: ACHING
DESCRIPTORS: ACHING

## 2023-08-08 ASSESSMENT — PAIN DESCRIPTION - ORIENTATION
ORIENTATION: MID
ORIENTATION: MID

## 2023-08-08 NOTE — PROGRESS NOTES
Hospitalist Progress Note    NAME:   Oliverio Mariscal   : 1970   MRN: [id number]     Date/Time: 2023 2:31 PM  Patient PCP: Luan Arroyo MD    Estimated discharge date:> 48 hours  Barriers:  bumex drip      Assessment / Plan:  Acute on chronic hyponatremia  Chronic Diastolic heart failure  PERLA on CKD 3  Anemia of chronic disease  Patient still with significant LE edema. Hypervolemia  S/P RHC :  Elevated left and right sided filling pressures   Elevated pulmonary pressures     Switched to bumex infusion . Legs still very swollen but Na improved to 132  Continue Fluid restriction     Hypoglycemia in setting of insulin-dependent diabetes mellitus  Off D10 infusion  Increase lantus 30 units daily (PTA 35)  Add scheduled premeal 5 units TID (PTA 10). Follow closely     Psychiatric disturbance  Bilateral upper extremity tremors  Continue PTA Abilify, Rexulti, BuSpar  Continue PTA as needed Xanax-decreased to 1 mg on admission - oversedation   Tremor evaluated by neurology at prior encounter with MRI brain demonstrating chronic small vessel ischemic disease without acute stroke-plan for outpatient follow-up in 6-8 weeks  Continue xanax prn (PTA dose)     Chronic pain secondary to endometriosis with opioid dependence  Continue PTA morphine  Hold PTA oxycodone-oversedated at time of my exam and opiates are known to contribute to hyponatremia  Recommend attempts be made to wean opioids in outpatient setting particularly in setting of multiple psychoactive medications  Continue PTA gabapentin        Medical Decision Making:   I personally reviewed labs: CBC, BMP  I personally reviewed imaging:   I personally reviewed EKG:   Toxic drug monitoring:   Discussed case with: Patient, RN, nephrology           Code Status: Full code  DVT Prophylaxis: Lovenox    Subjective:     Chief Complaint / Reason for Physician Visit  Follow-up for hyponatremia, hyperglycemia. Legs remain swollen.   Back from Multidiciplinary team rounds were held today with , nursing, pharmacist and clinical coordinator. Patient's plan of care was discussed; medications were reviewed and discharge planning was addressed. ________________________________________________________________________  Total NON critical care TIME:  35  Minutes    Total CRITICAL CARE TIME Spent:   Minutes non procedure based      Comments   >50% of visit spent in counseling and coordination of care     ________________________________________________________________________  Jayla Peterson MD     Procedures: see electronic medical records for all procedures/Xrays and details which were not copied into this note but were reviewed prior to creation of Plan. LABS:  I reviewed today's most current labs and imaging studies.   Pertinent labs include:  Recent Labs     08/08/23  0628   WBC 6.8   HGB 7.4*   HCT 24.6*          Recent Labs     08/06/23  1923 08/07/23  0423 08/08/23  0628   * 125* 132*   K 3.7 3.6 3.4*   CL 90* 89* 101   CO2 32 31 23   GLUCOSE 240* 136* 355*   BUN 19 20 19   CREATININE 1.12* 1.11* 1.15*   CALCIUM 8.5 8.6 6.9*   MG  --  1.8 1.4*   PHOS  --  3.8  --    LABALBU  --  3.2*  --          Signed: Jayla Peterson MD

## 2023-08-08 NOTE — PROGRESS NOTES
0700: Bedside and Verbal shift change report given to ADELIA Padron (oncoming nurse) by Kassy Alvarez RN (offgoing nurse). Report included the following information Nurse Handoff Report, Index, Intake/Output, and MAR.      0715: Patient off floor to Cath lab.     0815: RN received telephone report from Cath lab. No complications,16 gauge IV placed in R forearm     0820: Patient returned to floor. 0900: POC MD Don notified. 2025: New IV placed from cath does not flush, bleeding under dressing and site infiltrated. Rn removed    1400: Increase in periodic hand tremors and jaw tremors noted. RN noted multiple occurrence of patient staring off into space while drooling and having tremors. MD notified, no new orders received. Patient encourage to turn and reposition q2 throughout shift. Patient up to chair periodically throughout the day. 1,005 mL of 1,500 fluid restriction intake obtained. End of Shift Note    Bedside shift change report given to RN (oncoming nurse) by Suzie Castro RN (offgoing nurse). Report included the following information SBAR, Kardex, Intake/Output, MAR, and Recent Results    Shift worked:  [id number]     Shift summary and any significant changes:     See above     Concerns for physician to address:  See above      Zone phone for oncoming shift:          Activity:     Number times ambulated in hallways past shift: 0  Number of times OOB to chair past shift: 3    Cardiac:   Cardiac Monitoring: Yes           Access:  Current line(s): PIV     Genitourinary:   Urinary status: voiding    Respiratory:      Chronic home O2 use?: YES  Incentive spirometer at bedside: NO       GI:     Current diet:  ADULT DIET;  Regular; 4 carb choices (60 gm/meal); 1500 ml  Passing flatus: YES  Tolerating current diet: YES       Pain Management:   Patient states pain is manageable on current regimen: YES    Skin:     Interventions: turn team, increase time out of bed, limit briefs, and nutritional support    Patient Safety:  Fall Score:    Interventions: bed/chair alarm, assistive device (walker, cane.  etc), gripper socks, pt to call before getting OOB, and stay with me (per policy)       Length of Stay:  Expected LOS: 7  Actual LOS: 5      Javier Pressley RN

## 2023-08-08 NOTE — PROGRESS NOTES
Brief Procedure Note:         Indication:   CKD, Volume overload, CHF    Procedure:   RHC    Complications: None   Blood loss: Minimal   Condition: Stable     Brief procedure Result:     Hemodynamics: Ao: 144/67    RA: 15  RV:  51/15  PA:  52/26/ 34  PCWP: 21  TP  PVR: 2.2 PADILLA    Jose:   CO:  6.01 L/min  CI:  3.24 L/min/m2      Elevated left and right sided filling pressures   Elevated pulmonary pressures     Recommendations:   Continue bumex, renal following.      Full note and recommendations to follow. '

## 2023-08-08 NOTE — CARDIO/PULMONARY
Chart reviewed: Patient is 47 y.o. 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

## 2023-08-09 LAB
ANION GAP SERPL CALC-SCNC: 7 MMOL/L (ref 5–15)
BUN SERPL-MCNC: 21 MG/DL (ref 6–20)
BUN/CREAT SERPL: 14 (ref 12–20)
CALCIUM SERPL-MCNC: 9.1 MG/DL (ref 8.5–10.1)
CHLORIDE SERPL-SCNC: 88 MMOL/L (ref 97–108)
CO2 SERPL-SCNC: 33 MMOL/L (ref 21–32)
CREAT SERPL-MCNC: 1.48 MG/DL (ref 0.55–1.02)
ECHO BSA: 1.89 M2
GLUCOSE BLD STRIP.AUTO-MCNC: 207 MG/DL (ref 65–117)
GLUCOSE BLD STRIP.AUTO-MCNC: 261 MG/DL (ref 65–117)
GLUCOSE BLD STRIP.AUTO-MCNC: 318 MG/DL (ref 65–117)
GLUCOSE BLD STRIP.AUTO-MCNC: 418 MG/DL (ref 65–117)
GLUCOSE SERPL-MCNC: 391 MG/DL (ref 65–100)
POTASSIUM SERPL-SCNC: 3.5 MMOL/L (ref 3.5–5.1)
SERVICE CMNT-IMP: ABNORMAL
SODIUM SERPL-SCNC: 128 MMOL/L (ref 136–145)

## 2023-08-09 PROCEDURE — 2700000000 HC OXYGEN THERAPY PER DAY

## 2023-08-09 PROCEDURE — 6370000000 HC RX 637 (ALT 250 FOR IP): Performed by: STUDENT IN AN ORGANIZED HEALTH CARE EDUCATION/TRAINING PROGRAM

## 2023-08-09 PROCEDURE — 80048 BASIC METABOLIC PNL TOTAL CA: CPT

## 2023-08-09 PROCEDURE — 6370000000 HC RX 637 (ALT 250 FOR IP): Performed by: INTERNAL MEDICINE

## 2023-08-09 PROCEDURE — 2500000003 HC RX 250 WO HCPCS: Performed by: INTERNAL MEDICINE

## 2023-08-09 PROCEDURE — 94640 AIRWAY INHALATION TREATMENT: CPT

## 2023-08-09 PROCEDURE — 6360000002 HC RX W HCPCS: Performed by: INTERNAL MEDICINE

## 2023-08-09 PROCEDURE — 2580000003 HC RX 258: Performed by: STUDENT IN AN ORGANIZED HEALTH CARE EDUCATION/TRAINING PROGRAM

## 2023-08-09 PROCEDURE — 94664 DEMO&/EVAL PT USE INHALER: CPT

## 2023-08-09 PROCEDURE — 82962 GLUCOSE BLOOD TEST: CPT

## 2023-08-09 PROCEDURE — 36415 COLL VENOUS BLD VENIPUNCTURE: CPT

## 2023-08-09 PROCEDURE — 2060000000 HC ICU INTERMEDIATE R&B

## 2023-08-09 PROCEDURE — 6360000002 HC RX W HCPCS: Performed by: STUDENT IN AN ORGANIZED HEALTH CARE EDUCATION/TRAINING PROGRAM

## 2023-08-09 RX ORDER — ALBUTEROL SULFATE 2.5 MG/3ML
2.5 SOLUTION RESPIRATORY (INHALATION) EVERY 6 HOURS PRN
Status: DISCONTINUED | OUTPATIENT
Start: 2023-08-09 | End: 2023-08-13 | Stop reason: HOSPADM

## 2023-08-09 RX ORDER — ACETAZOLAMIDE 250 MG/1
250 TABLET ORAL 2 TIMES DAILY
Status: COMPLETED | OUTPATIENT
Start: 2023-08-09 | End: 2023-08-09

## 2023-08-09 RX ORDER — GUAIFENESIN 200 MG/10ML
200 LIQUID ORAL EVERY 4 HOURS PRN
Status: DISCONTINUED | OUTPATIENT
Start: 2023-08-09 | End: 2023-08-13 | Stop reason: HOSPADM

## 2023-08-09 RX ORDER — INSULIN GLARGINE 100 [IU]/ML
5 INJECTION, SOLUTION SUBCUTANEOUS ONCE
Status: COMPLETED | OUTPATIENT
Start: 2023-08-09 | End: 2023-08-09

## 2023-08-09 RX ORDER — INSULIN GLARGINE 100 [IU]/ML
35 INJECTION, SOLUTION SUBCUTANEOUS DAILY
Status: DISCONTINUED | OUTPATIENT
Start: 2023-08-10 | End: 2023-08-10

## 2023-08-09 RX ADMIN — INSULIN GLARGINE 30 UNITS: 100 INJECTION, SOLUTION SUBCUTANEOUS at 08:41

## 2023-08-09 RX ADMIN — SODIUM CHLORIDE, PRESERVATIVE FREE 10 ML: 5 INJECTION INTRAVENOUS at 21:35

## 2023-08-09 RX ADMIN — ASPIRIN 81 MG: 81 TABLET, COATED ORAL at 08:42

## 2023-08-09 RX ADMIN — SODIUM CHLORIDE, PRESERVATIVE FREE 10 ML: 5 INJECTION INTRAVENOUS at 08:49

## 2023-08-09 RX ADMIN — POLYETHYLENE GLYCOL 3350 17 G: 17 POWDER, FOR SOLUTION ORAL at 08:53

## 2023-08-09 RX ADMIN — FAMOTIDINE 40 MG: 20 TABLET, FILM COATED ORAL at 08:42

## 2023-08-09 RX ADMIN — ACETAZOLAMIDE 250 MG: 250 TABLET ORAL at 21:32

## 2023-08-09 RX ADMIN — MORPHINE SULFATE 90 MG: 30 TABLET, FILM COATED, EXTENDED RELEASE ORAL at 08:42

## 2023-08-09 RX ADMIN — BREXPIPRAZOLE 1 MG: 1 TABLET ORAL at 08:48

## 2023-08-09 RX ADMIN — BUMETANIDE 0.75 MG/HR: 0.25 INJECTION INTRAMUSCULAR; INTRAVENOUS at 14:06

## 2023-08-09 RX ADMIN — ALBUTEROL SULFATE 2.5 MG: 2.5 SOLUTION RESPIRATORY (INHALATION) at 09:46

## 2023-08-09 RX ADMIN — Medication 8 UNITS: at 12:20

## 2023-08-09 RX ADMIN — ALPRAZOLAM 2 MG: 0.5 TABLET ORAL at 21:32

## 2023-08-09 RX ADMIN — Medication 1 G: at 17:24

## 2023-08-09 RX ADMIN — GABAPENTIN 300 MG: 300 CAPSULE ORAL at 13:34

## 2023-08-09 RX ADMIN — ACETAZOLAMIDE 250 MG: 250 TABLET ORAL at 12:21

## 2023-08-09 RX ADMIN — FERROUS SULFATE TAB 325 MG (65 MG ELEMENTAL FE) 325 MG: 325 (65 FE) TAB at 21:32

## 2023-08-09 RX ADMIN — OXYCODONE HYDROCHLORIDE 5 MG: 5 TABLET ORAL at 14:20

## 2023-08-09 RX ADMIN — BUSPIRONE HYDROCHLORIDE 15 MG: 7.5 TABLET ORAL at 08:42

## 2023-08-09 RX ADMIN — Medication 1 G: at 08:43

## 2023-08-09 RX ADMIN — Medication 5 UNITS: at 08:41

## 2023-08-09 RX ADMIN — Medication 4 UNITS: at 17:23

## 2023-08-09 RX ADMIN — Medication 5 UNITS: at 17:24

## 2023-08-09 RX ADMIN — Medication 6 UNITS: at 08:40

## 2023-08-09 RX ADMIN — INSULIN GLARGINE 5 UNITS: 100 INJECTION, SOLUTION SUBCUTANEOUS at 12:19

## 2023-08-09 RX ADMIN — GABAPENTIN 300 MG: 300 CAPSULE ORAL at 21:32

## 2023-08-09 RX ADMIN — BUMETANIDE 0.75 MG/HR: 0.25 INJECTION INTRAMUSCULAR; INTRAVENOUS at 23:40

## 2023-08-09 RX ADMIN — ENOXAPARIN SODIUM 40 MG: 100 INJECTION SUBCUTANEOUS at 08:41

## 2023-08-09 RX ADMIN — ROSUVASTATIN CALCIUM 40 MG: 40 TABLET, COATED ORAL at 08:42

## 2023-08-09 RX ADMIN — ALPRAZOLAM 2 MG: 0.5 TABLET ORAL at 08:53

## 2023-08-09 RX ADMIN — Medication 5 UNITS: at 12:20

## 2023-08-09 RX ADMIN — ARIPIPRAZOLE 30 MG: 5 TABLET ORAL at 08:48

## 2023-08-09 RX ADMIN — OXYCODONE HYDROCHLORIDE 5 MG: 5 TABLET ORAL at 18:33

## 2023-08-09 RX ADMIN — Medication 1 G: at 12:21

## 2023-08-09 RX ADMIN — GABAPENTIN 300 MG: 300 CAPSULE ORAL at 08:42

## 2023-08-09 RX ADMIN — BUSPIRONE HYDROCHLORIDE 15 MG: 7.5 TABLET ORAL at 21:31

## 2023-08-09 RX ADMIN — MORPHINE SULFATE 90 MG: 30 TABLET, FILM COATED, EXTENDED RELEASE ORAL at 21:32

## 2023-08-09 RX ADMIN — GUAIFENESIN 200 MG: 200 SOLUTION ORAL at 21:44

## 2023-08-09 ASSESSMENT — PAIN DESCRIPTION - ORIENTATION
ORIENTATION: MID
ORIENTATION: ANTERIOR;MID;LOWER
ORIENTATION: MID

## 2023-08-09 ASSESSMENT — PAIN DESCRIPTION - LOCATION
LOCATION: ABDOMEN

## 2023-08-09 ASSESSMENT — PAIN SCALES - GENERAL
PAINLEVEL_OUTOF10: 9
PAINLEVEL_OUTOF10: 8
PAINLEVEL_OUTOF10: 8
PAINLEVEL_OUTOF10: 0
PAINLEVEL_OUTOF10: 7
PAINLEVEL_OUTOF10: 5
PAINLEVEL_OUTOF10: 8

## 2023-08-09 ASSESSMENT — PAIN DESCRIPTION - DESCRIPTORS
DESCRIPTORS: ACHING;CRAMPING
DESCRIPTORS: CRAMPING
DESCRIPTORS: ACHING
DESCRIPTORS: CRAMPING
DESCRIPTORS: CRAMPING
DESCRIPTORS: ACHING;CRAMPING

## 2023-08-09 NOTE — PLAN OF CARE
Problem: Discharge Planning  Goal: Discharge to home or other facility with appropriate resources  Outcome: Progressing     Problem: Pain  Goal: Verbalizes/displays adequate comfort level or baseline comfort level  Outcome: Progressing     Problem: Safety - Adult  Goal: Free from fall injury  Outcome: Progressing     Problem: Skin/Tissue Integrity  Goal: Absence of new skin breakdown  Description: 1. Monitor for areas of redness and/or skin breakdown  2. Assess vascular access sites hourly  3. Every 4-6 hours minimum:  Change oxygen saturation probe site  4. Every 4-6 hours:  If on nasal continuous positive airway pressure, respiratory therapy assess nares and determine need for appliance change or resting period.   Outcome: Progressing     Problem: ABCDS Injury Assessment  Goal: Absence of physical injury  Outcome: Progressing     Problem: Chronic Conditions and Co-morbidities  Goal: Patient's chronic conditions and co-morbidity symptoms are monitored and maintained or improved  Outcome: Progressing  Flowsheets (Taken 8/9/2023 1326)  Care Plan - Patient's Chronic Conditions and Co-Morbidity Symptoms are Monitored and Maintained or Improved: Collaborate with multidisciplinary team to address chronic and comorbid conditions and prevent exacerbation or deterioration     Problem: Respiratory - Adult  Goal: Achieves optimal ventilation and oxygenation  8/9/2023 1326 by Ayala Salcido, RN  Outcome: Progressing  8/9/2023 0953 by Blair Brumfield, RT  Outcome: Progressing

## 2023-08-09 NOTE — PROGRESS NOTES
0730: Bedside and Verbal shift change report given to 74-03 [facility] and Bernarda RN (oncoming nurse) by [employer] (offgoing nurse). Report included the following information Nurse Handoff Report, Adult Overview, MAR, Recent Results, and Cardiac Rhythm NSR .     1208: Patients . MD aware. 1400: Patient presents with tremors of the arms. Patient requested neurology consult. Joanne HAAS notified and referred her outpatient. End of Shift Note    Bedside shift change report given to [address] [facility] (oncoming nurse) by Marielos Pascual RN (offgoing nurse). Report included the following information SBAR, Intake/Output, MAR, Recent Results, and Cardiac Rhythm NSR    Shift worked:  7am-7pm     Shift summary and any significant changes:     See above      Concerns for physician to address:  N/A     Zone phone for oncoming shift:          Activity:     Number times ambulated in hallways past shift: 0  Number of times OOB to chair past shift: 5    Cardiac:   Cardiac Monitoring: Yes           Access:  Current line(s): PIV     Genitourinary:   Urinary status: voiding    Respiratory:      Chronic home O2 use?: YES  Incentive spirometer at bedside: YES       GI:     Current diet:  ADULT DIET;  Regular; 4 carb choices (60 gm/meal); 1500 ml  Passing flatus: YES  Tolerating current diet: YES       Pain Management:   Patient states pain is manageable on current regimen: YES    Skin:     Interventions: increase time out of bed    Patient Safety:  Fall Score:    Interventions: gripper socks and pt to call before getting OOB       Length of Stay:  Expected LOS: 7  Actual LOS: 1099 [facility] Fine, RN

## 2023-08-09 NOTE — PROGRESS NOTES
I have reviewed and am in agreement with the assessment and documentation as performed by my Fransisco Acosta RN. Please refer to Federica's progress note for update on pt's daily events.

## 2023-08-10 LAB
ALBUMIN SERPL-MCNC: 3.6 G/DL (ref 3.5–5)
ANION GAP SERPL CALC-SCNC: 8 MMOL/L (ref 5–15)
BUN SERPL-MCNC: 25 MG/DL (ref 6–20)
BUN/CREAT SERPL: 16 (ref 12–20)
CALCIUM SERPL-MCNC: 9.4 MG/DL (ref 8.5–10.1)
CHLORIDE SERPL-SCNC: 90 MMOL/L (ref 97–108)
CO2 SERPL-SCNC: 30 MMOL/L (ref 21–32)
CREAT SERPL-MCNC: 1.58 MG/DL (ref 0.55–1.02)
GLUCOSE BLD STRIP.AUTO-MCNC: 139 MG/DL (ref 65–117)
GLUCOSE BLD STRIP.AUTO-MCNC: 343 MG/DL (ref 65–117)
GLUCOSE BLD STRIP.AUTO-MCNC: 370 MG/DL (ref 65–117)
GLUCOSE BLD STRIP.AUTO-MCNC: 383 MG/DL (ref 65–117)
GLUCOSE BLD STRIP.AUTO-MCNC: 96 MG/DL (ref 65–117)
GLUCOSE SERPL-MCNC: 327 MG/DL (ref 65–100)
PHOSPHATE SERPL-MCNC: 6.3 MG/DL (ref 2.6–4.7)
POTASSIUM SERPL-SCNC: 3.7 MMOL/L (ref 3.5–5.1)
SERVICE CMNT-IMP: ABNORMAL
SERVICE CMNT-IMP: NORMAL
SODIUM SERPL-SCNC: 128 MMOL/L (ref 136–145)

## 2023-08-10 PROCEDURE — 2700000000 HC OXYGEN THERAPY PER DAY

## 2023-08-10 PROCEDURE — 2060000000 HC ICU INTERMEDIATE R&B

## 2023-08-10 PROCEDURE — 80069 RENAL FUNCTION PANEL: CPT

## 2023-08-10 PROCEDURE — 6370000000 HC RX 637 (ALT 250 FOR IP): Performed by: INTERNAL MEDICINE

## 2023-08-10 PROCEDURE — 2500000003 HC RX 250 WO HCPCS: Performed by: INTERNAL MEDICINE

## 2023-08-10 PROCEDURE — 36415 COLL VENOUS BLD VENIPUNCTURE: CPT

## 2023-08-10 PROCEDURE — 94760 N-INVAS EAR/PLS OXIMETRY 1: CPT

## 2023-08-10 PROCEDURE — 2580000003 HC RX 258: Performed by: STUDENT IN AN ORGANIZED HEALTH CARE EDUCATION/TRAINING PROGRAM

## 2023-08-10 PROCEDURE — 6360000002 HC RX W HCPCS: Performed by: STUDENT IN AN ORGANIZED HEALTH CARE EDUCATION/TRAINING PROGRAM

## 2023-08-10 PROCEDURE — 6370000000 HC RX 637 (ALT 250 FOR IP): Performed by: STUDENT IN AN ORGANIZED HEALTH CARE EDUCATION/TRAINING PROGRAM

## 2023-08-10 PROCEDURE — 82962 GLUCOSE BLOOD TEST: CPT

## 2023-08-10 RX ORDER — BUMETANIDE 0.25 MG/ML
2 INJECTION INTRAMUSCULAR; INTRAVENOUS 3 TIMES DAILY
Status: DISCONTINUED | OUTPATIENT
Start: 2023-08-10 | End: 2023-08-12

## 2023-08-10 RX ORDER — INSULIN GLARGINE 100 [IU]/ML
30 INJECTION, SOLUTION SUBCUTANEOUS DAILY
Status: DISCONTINUED | OUTPATIENT
Start: 2023-08-11 | End: 2023-08-11

## 2023-08-10 RX ORDER — INSULIN LISPRO 100 [IU]/ML
8 INJECTION, SOLUTION INTRAVENOUS; SUBCUTANEOUS
Status: DISCONTINUED | OUTPATIENT
Start: 2023-08-10 | End: 2023-08-13 | Stop reason: HOSPADM

## 2023-08-10 RX ORDER — INSULIN GLARGINE 100 [IU]/ML
40 INJECTION, SOLUTION SUBCUTANEOUS DAILY
Status: DISCONTINUED | OUTPATIENT
Start: 2023-08-10 | End: 2023-08-10

## 2023-08-10 RX ORDER — BUMETANIDE 0.25 MG/ML
2 INJECTION INTRAMUSCULAR; INTRAVENOUS 2 TIMES DAILY
Status: DISCONTINUED | OUTPATIENT
Start: 2023-08-10 | End: 2023-08-10

## 2023-08-10 RX ADMIN — SODIUM CHLORIDE, PRESERVATIVE FREE 10 ML: 5 INJECTION INTRAVENOUS at 09:30

## 2023-08-10 RX ADMIN — GABAPENTIN 300 MG: 300 CAPSULE ORAL at 09:28

## 2023-08-10 RX ADMIN — Medication 1 G: at 11:44

## 2023-08-10 RX ADMIN — BUMETANIDE 2 MG: 0.25 INJECTION, SOLUTION INTRAMUSCULAR; INTRAVENOUS at 11:44

## 2023-08-10 RX ADMIN — MORPHINE SULFATE 90 MG: 30 TABLET, FILM COATED, EXTENDED RELEASE ORAL at 20:41

## 2023-08-10 RX ADMIN — MORPHINE SULFATE 90 MG: 30 TABLET, FILM COATED, EXTENDED RELEASE ORAL at 09:28

## 2023-08-10 RX ADMIN — Medication 5 UNITS: at 09:29

## 2023-08-10 RX ADMIN — BREXPIPRAZOLE 1 MG: 1 TABLET ORAL at 09:43

## 2023-08-10 RX ADMIN — Medication 4 UNITS: at 22:02

## 2023-08-10 RX ADMIN — SODIUM CHLORIDE, PRESERVATIVE FREE 10 ML: 5 INJECTION INTRAVENOUS at 20:45

## 2023-08-10 RX ADMIN — Medication 1 G: at 18:33

## 2023-08-10 RX ADMIN — ROSUVASTATIN CALCIUM 40 MG: 40 TABLET, COATED ORAL at 09:28

## 2023-08-10 RX ADMIN — GABAPENTIN 300 MG: 300 CAPSULE ORAL at 15:33

## 2023-08-10 RX ADMIN — Medication 5 UNITS: at 12:35

## 2023-08-10 RX ADMIN — Medication 1 G: at 09:28

## 2023-08-10 RX ADMIN — ENOXAPARIN SODIUM 40 MG: 100 INJECTION SUBCUTANEOUS at 09:28

## 2023-08-10 RX ADMIN — BUSPIRONE HYDROCHLORIDE 15 MG: 7.5 TABLET ORAL at 09:28

## 2023-08-10 RX ADMIN — Medication 8 UNITS: at 09:29

## 2023-08-10 RX ADMIN — BUMETANIDE 2 MG: 0.25 INJECTION, SOLUTION INTRAMUSCULAR; INTRAVENOUS at 15:33

## 2023-08-10 RX ADMIN — INSULIN GLARGINE 40 UNITS: 100 INJECTION, SOLUTION SUBCUTANEOUS at 09:29

## 2023-08-10 RX ADMIN — Medication 8 UNITS: at 12:36

## 2023-08-10 RX ADMIN — ASPIRIN 81 MG: 81 TABLET, COATED ORAL at 09:28

## 2023-08-10 RX ADMIN — ALPRAZOLAM 2 MG: 0.5 TABLET ORAL at 11:44

## 2023-08-10 RX ADMIN — GABAPENTIN 300 MG: 300 CAPSULE ORAL at 20:40

## 2023-08-10 RX ADMIN — ARIPIPRAZOLE 30 MG: 5 TABLET ORAL at 09:43

## 2023-08-10 RX ADMIN — BUSPIRONE HYDROCHLORIDE 15 MG: 7.5 TABLET ORAL at 20:40

## 2023-08-10 RX ADMIN — ALPRAZOLAM 2 MG: 0.5 TABLET ORAL at 20:40

## 2023-08-10 RX ADMIN — FAMOTIDINE 40 MG: 20 TABLET, FILM COATED ORAL at 09:28

## 2023-08-10 ASSESSMENT — PAIN SCALES - GENERAL
PAINLEVEL_OUTOF10: 7
PAINLEVEL_OUTOF10: 3
PAINLEVEL_OUTOF10: 0

## 2023-08-10 NOTE — PROGRESS NOTES
Bedside and Verbal shift change report given to [facility] (oncoming nurse) by Mati Jenkins (offgoing nurse). Report included the following information Nurse Handoff Report, Index, Intake/Output, MAR, Recent Results, and Cardiac Rhythm NSR . End of Shift Note    Bedside shift change report given to Gricel Aceves (oncoming nurse) by Linda Luna RN (offgoing nurse). Report included the following information SBAR, Kardex, Intake/Output, MAR, Recent Results, and Cardiac Rhythm NSR    Shift worked:  7p-7a     Shift summary and any significant changes:     PRN xanax given.     Concerns for physician to address:       Zone phone for oncoming shift:          Linda Luna RN

## 2023-08-10 NOTE — PROGRESS NOTES
Bedside and Verbal shift change report given to Tushar Adam RN  (oncoming nurse) by Yonas Quigley (offgoing nurse). Report included the following information Nurse Handoff Report, Index, ED Encounter Summary, Intake/Output, MAR, Recent Results, and Cardiac Rhythm NSR/Sinus tach . [id number] Lee MD notified of elevated BG level per sliding scale order. [id number] Lee MD notified of pts decrease in POC BG level from 370 at 1130 to 96 at 1610. MD request levels be closely monitored. End of Shift Note    Bedside shift change report given to RN (oncoming nurse) by Tushar Adam RN (offgoing nurse). Report included the following information SBAR, Kardex, Intake/Output, MAR, Recent Results, and Cardiac Rhythm NSR    Shift worked:  1654-9430     Shift summary and any significant changes:     See note above    Output for shift:      Concerns for physician to address:  None     Zone phone for oncoming shift:          Activity:     Number times ambulated in hallways past shift: 2  Number of times OOB to chair past shift: 3    Cardiac:   Cardiac Monitoring: Yes           Access:  Current line(s): PIV     Genitourinary:   Urinary status: voiding    Respiratory:      Chronic home O2 use?: YES  Incentive spirometer at bedside: NO       GI:     Current diet:  ADULT DIET; Regular; 4 carb choices (60 gm/meal); 1500 ml  Passing flatus: YES  Tolerating current diet: YES       Pain Management:   Patient states pain is manageable on current regimen: YES    Skin:     Interventions: float heels, increase time out of bed, PT/OT consult, and limit briefs    Patient Safety:  Fall Score:    Interventions: assistive device (walker, cane.  etc), gripper socks, pt to call before getting OOB, and stay with me (per policy)       Length of Stay:  Expected LOS: 8  Actual LOS: 7      Tushar Adam RN

## 2023-08-10 NOTE — PLAN OF CARE
Problem: Discharge Planning  Goal: Discharge to home or other facility with appropriate resources  8/10/2023 0244 by Lubna Salmon RN  Outcome: Progressing  8/9/2023 1326 by Randi Dumont RN  Outcome: Progressing     Problem: Pain  Goal: Verbalizes/displays adequate comfort level or baseline comfort level  8/10/2023 0244 by Lubna Salmon RN  Outcome: Progressing  8/9/2023 1326 by Randi Dumont RN  Outcome: Progressing     Problem: Safety - Adult  Goal: Free from fall injury  8/10/2023 0244 by Lubna Salmon RN  Outcome: Progressing  8/9/2023 1326 by Randi Dumont RN  Outcome: Progressing     Problem: ABCDS Injury Assessment  Goal: Absence of physical injury  8/10/2023 0244 by Lubna Salmon RN  Outcome: Progressing  8/9/2023 1326 by Randi Dumont RN  Outcome: Progressing     Problem: Respiratory - Adult  Goal: Achieves optimal ventilation and oxygenation  8/10/2023 0244 by Lubna Salmon RN  Outcome: Progressing  8/9/2023 1326 by Randi Dumont RN  Outcome: Progressing

## 2023-08-10 NOTE — CARE COORDINATION
Transition of Care Plan:     RUR: 29% (high RUR, readmission)  Prior Level of Functioning: Independent  Disposition: Home with follow ups. If SNF or IPR: Date FOC offered: N/A  Date FOC received: N/A  Accepting facility: N/A  Date authorization started with reference number: N/A  Date authorization received and expires: N/A  Follow up appointments: PCP/specialists if needed. DME needed: None. Patient has home oxygen 3L O2 NC tanks and concentrator through [employer]. Transportation at discharge: Family. IM/IMM Medicare/ letter given: 2nd IM needed prior to discharge. Is patient a  and connected with VA? No.              If yes, was [employer] transfer form completed and VA notified? N/A  Caregiver Contact: Kourtney Potter [facility] - [phone]  Discharge Caregiver contacted prior to discharge? Patient to contact. Care Conference needed?  No.  Barriers to discharge: nephro and cardiology clearance, GUTIERREZ PierceN, RN    Care Management  [phone]

## 2023-08-10 NOTE — PLAN OF CARE
Problem: Discharge Planning  Goal: Discharge to home or other facility with appropriate resources  8/10/2023 0836 by Hiwot Zamora RN  Outcome: Progressing  8/10/2023 0244 by Lubna Salmon RN  Outcome: Progressing     Problem: Pain  Goal: Verbalizes/displays adequate comfort level or baseline comfort level  Recent Flowsheet Documentation  Taken 8/10/2023 0755 by Hiwot Zamora RN  Verbalizes/displays adequate comfort level or baseline comfort level: Encourage patient to monitor pain and request assistance  8/10/2023 0244 by Lubna Salmon RN  Outcome: Progressing     Problem: Safety - Adult  Goal: Free from fall injury  8/10/2023 0836 by Hiwot Zamora RN  Outcome: Progressing  8/10/2023 0244 by Lubna Salmon RN  Outcome: Progressing     Problem: ABCDS Injury Assessment  Goal: Absence of physical injury  8/10/2023 0836 by Hiwot Zamora RN  Outcome: Progressing  8/10/2023 0244 by Lubna Salmon RN  Outcome: Progressing     Problem: Respiratory - Adult  Goal: Achieves optimal ventilation and oxygenation  8/10/2023 0244 by Lubna Salmon RN  Outcome: Progressing

## 2023-08-11 LAB
ALBUMIN SERPL-MCNC: 3.2 G/DL (ref 3.5–5)
ANION GAP SERPL CALC-SCNC: 6 MMOL/L (ref 5–15)
BUN SERPL-MCNC: 34 MG/DL (ref 6–20)
BUN/CREAT SERPL: 18 (ref 12–20)
CALCIUM SERPL-MCNC: 9.1 MG/DL (ref 8.5–10.1)
CHLORIDE SERPL-SCNC: 90 MMOL/L (ref 97–108)
CO2 SERPL-SCNC: 31 MMOL/L (ref 21–32)
CREAT SERPL-MCNC: 1.87 MG/DL (ref 0.55–1.02)
GLUCOSE BLD STRIP.AUTO-MCNC: 271 MG/DL (ref 65–117)
GLUCOSE BLD STRIP.AUTO-MCNC: 286 MG/DL (ref 65–117)
GLUCOSE BLD STRIP.AUTO-MCNC: 453 MG/DL (ref 65–117)
GLUCOSE BLD STRIP.AUTO-MCNC: 481 MG/DL (ref 65–117)
GLUCOSE SERPL-MCNC: 432 MG/DL (ref 65–100)
PHOSPHATE SERPL-MCNC: 7.1 MG/DL (ref 2.6–4.7)
POTASSIUM SERPL-SCNC: 3.7 MMOL/L (ref 3.5–5.1)
SERVICE CMNT-IMP: ABNORMAL
SODIUM SERPL-SCNC: 127 MMOL/L (ref 136–145)

## 2023-08-11 PROCEDURE — 2700000000 HC OXYGEN THERAPY PER DAY

## 2023-08-11 PROCEDURE — 2500000003 HC RX 250 WO HCPCS: Performed by: INTERNAL MEDICINE

## 2023-08-11 PROCEDURE — 6360000002 HC RX W HCPCS: Performed by: STUDENT IN AN ORGANIZED HEALTH CARE EDUCATION/TRAINING PROGRAM

## 2023-08-11 PROCEDURE — 80069 RENAL FUNCTION PANEL: CPT

## 2023-08-11 PROCEDURE — 6370000000 HC RX 637 (ALT 250 FOR IP): Performed by: STUDENT IN AN ORGANIZED HEALTH CARE EDUCATION/TRAINING PROGRAM

## 2023-08-11 PROCEDURE — 94760 N-INVAS EAR/PLS OXIMETRY 1: CPT

## 2023-08-11 PROCEDURE — 2580000003 HC RX 258: Performed by: STUDENT IN AN ORGANIZED HEALTH CARE EDUCATION/TRAINING PROGRAM

## 2023-08-11 PROCEDURE — 2060000000 HC ICU INTERMEDIATE R&B

## 2023-08-11 PROCEDURE — 6370000000 HC RX 637 (ALT 250 FOR IP): Performed by: INTERNAL MEDICINE

## 2023-08-11 PROCEDURE — 36415 COLL VENOUS BLD VENIPUNCTURE: CPT

## 2023-08-11 PROCEDURE — 82962 GLUCOSE BLOOD TEST: CPT

## 2023-08-11 RX ORDER — INSULIN GLARGINE 100 [IU]/ML
40 INJECTION, SOLUTION SUBCUTANEOUS DAILY
Status: DISCONTINUED | OUTPATIENT
Start: 2023-08-12 | End: 2023-08-13 | Stop reason: HOSPADM

## 2023-08-11 RX ORDER — INSULIN GLARGINE 100 [IU]/ML
35 INJECTION, SOLUTION SUBCUTANEOUS DAILY
Status: DISCONTINUED | OUTPATIENT
Start: 2023-08-11 | End: 2023-08-11

## 2023-08-11 RX ADMIN — ALPRAZOLAM 2 MG: 0.5 TABLET ORAL at 22:52

## 2023-08-11 RX ADMIN — ROSUVASTATIN CALCIUM 40 MG: 40 TABLET, COATED ORAL at 09:09

## 2023-08-11 RX ADMIN — GABAPENTIN 300 MG: 300 CAPSULE ORAL at 22:08

## 2023-08-11 RX ADMIN — BUMETANIDE 2 MG: 0.25 INJECTION, SOLUTION INTRAMUSCULAR; INTRAVENOUS at 09:09

## 2023-08-11 RX ADMIN — Medication 4 UNITS: at 16:46

## 2023-08-11 RX ADMIN — ALPRAZOLAM 2 MG: 0.5 TABLET ORAL at 16:46

## 2023-08-11 RX ADMIN — FERROUS SULFATE TAB 325 MG (65 MG ELEMENTAL FE) 325 MG: 325 (65 FE) TAB at 22:09

## 2023-08-11 RX ADMIN — INSULIN GLARGINE 35 UNITS: 100 INJECTION, SOLUTION SUBCUTANEOUS at 09:10

## 2023-08-11 RX ADMIN — ENOXAPARIN SODIUM 40 MG: 100 INJECTION SUBCUTANEOUS at 09:10

## 2023-08-11 RX ADMIN — GUAIFENESIN 200 MG: 200 SOLUTION ORAL at 13:08

## 2023-08-11 RX ADMIN — Medication 8 UNITS: at 09:11

## 2023-08-11 RX ADMIN — BUSPIRONE HYDROCHLORIDE 15 MG: 7.5 TABLET ORAL at 22:07

## 2023-08-11 RX ADMIN — GABAPENTIN 300 MG: 300 CAPSULE ORAL at 13:08

## 2023-08-11 RX ADMIN — GUAIFENESIN 200 MG: 200 SOLUTION ORAL at 06:31

## 2023-08-11 RX ADMIN — BUSPIRONE HYDROCHLORIDE 15 MG: 7.5 TABLET ORAL at 09:09

## 2023-08-11 RX ADMIN — Medication 1 G: at 09:09

## 2023-08-11 RX ADMIN — Medication 8 UNITS: at 12:30

## 2023-08-11 RX ADMIN — ASPIRIN 81 MG: 81 TABLET, COATED ORAL at 09:10

## 2023-08-11 RX ADMIN — Medication 8 UNITS: at 16:46

## 2023-08-11 RX ADMIN — SODIUM CHLORIDE, PRESERVATIVE FREE 10 ML: 5 INJECTION INTRAVENOUS at 09:12

## 2023-08-11 RX ADMIN — MORPHINE SULFATE 90 MG: 30 TABLET, FILM COATED, EXTENDED RELEASE ORAL at 09:09

## 2023-08-11 RX ADMIN — ARIPIPRAZOLE 30 MG: 5 TABLET ORAL at 09:10

## 2023-08-11 RX ADMIN — POLYETHYLENE GLYCOL 3350 17 G: 17 POWDER, FOR SOLUTION ORAL at 09:33

## 2023-08-11 RX ADMIN — OXYCODONE HYDROCHLORIDE 5 MG: 5 TABLET ORAL at 13:36

## 2023-08-11 RX ADMIN — Medication 8 UNITS: at 12:31

## 2023-08-11 RX ADMIN — OXYCODONE HYDROCHLORIDE 5 MG: 5 TABLET ORAL at 20:01

## 2023-08-11 RX ADMIN — GABAPENTIN 300 MG: 300 CAPSULE ORAL at 09:09

## 2023-08-11 RX ADMIN — MORPHINE SULFATE 90 MG: 30 TABLET, FILM COATED, EXTENDED RELEASE ORAL at 22:10

## 2023-08-11 RX ADMIN — FAMOTIDINE 40 MG: 20 TABLET, FILM COATED ORAL at 09:09

## 2023-08-11 RX ADMIN — BREXPIPRAZOLE 1 MG: 1 TABLET ORAL at 09:10

## 2023-08-11 ASSESSMENT — PAIN SCALES - GENERAL
PAINLEVEL_OUTOF10: 9
PAINLEVEL_OUTOF10: 8
PAINLEVEL_OUTOF10: 0

## 2023-08-11 ASSESSMENT — PAIN DESCRIPTION - LOCATION
LOCATION: ABDOMEN
LOCATION: ABDOMEN

## 2023-08-11 ASSESSMENT — PAIN DESCRIPTION - DESCRIPTORS: DESCRIPTORS: SHARP

## 2023-08-11 NOTE — PROGRESS NOTES
1900 Bedside and Verbal shift change report given to [address] [facility] and Bear Baez RN (oncoming nurse) by Lissa Rome RN (offgoing nurse). Report included the following information Nurse Handoff Report, ED SBAR, MAR, Recent Results, and Cardiac Rhythm NSR .     2050 BP was 120/52 and repeat was 100/48. Bumex 2mg not yet given and Mathew Reinoso NP notified and informed to hold Bumex (    End of Shift Note    Bedside shift change report given to [facility] [facility] (oncoming nurse) by Young Mclaughlin RN and Alexandra Hylton RN (offgoing nurse). Report included the following information SBAR, ED Summary, MAR, Recent Results, and Cardiac Rhythm NSR    Shift worked:  7p-7a     Shift summary and any significant changes:    Bloods collected and sent to lab. See above       Concerns for physician to address:        Zone phone for oncoming shift:           Activity:     Number times ambulated in hallways past shift: 0  Number of times OOB to chair past shift: 0    Cardiac:   Cardiac Monitoring: Yes           Access:  Current line(s): PIV     Genitourinary:   Urinary status: voiding    Respiratory:      Chronic home O2 use?: NO  Incentive spirometer at bedside: NO       GI:     Current diet:  ADULT DIET; Regular; 4 carb choices (60 gm/meal); 1500 ml  Passing flatus: YES  Tolerating current diet: YES       Pain Management:   Patient states pain is manageable on current regimen: YES    Skin:     Interventions: specialty bed, float heels, increase time out of bed, foam dressing, PT/OT consult, limit briefs, internal/external urinary devices, and nutritional support    Patient Safety:  Fall Score:    Interventions: bed/chair alarm, assistive device (walker, cane.  etc), gripper socks, pt to call before getting OOB, stay with me (per policy), and gait belt       Length of Stay:  Expected LOS: 8  Actual LOS: 8      Alexandra Hylton, ADELIA

## 2023-08-11 NOTE — PROGRESS NOTES
Comprehensive Nutrition Assessment    Type and Reason for Visit:  Initial, RD Nutrition Re-Screen/LOS    Nutrition Recommendations/Plan:   Continue current diet  Please document % meals and supplements consumed in flowsheet I/O's under intake   Recommend consult to Diabetes CNS to improve BG control     Malnutrition Assessment:  Malnutrition Status:  Insufficient data (08/11/23 1639)        Nutrition Assessment:     Chart reviewed for LOS. Pt medically noted for acute on chronic diastolic heart failure, PERLA on CKD3, pseudohyponatremia, HTN, anemia, chronic pain 2/2 endometriosis. MST negative for malnutrition risk factors. Good PO intake documented. Recommend consult to Diabetes CNS for improving BG control. Patient Vitals for the past 120 hrs:   PO Meals Eaten (%)   08/08/23 1430 76 - 100%   08/08/23 1045 76 - 100%     Wt Readings from Last 5 Encounters:   08/10/23 84.8 kg (187 lb)   07/31/23 84.8 kg (186 lb 15.2 oz)   07/14/23 84.5 kg (186 lb 4.6 oz)   06/23/23 90.7 kg (199 lb 15.3 oz)   06/06/23 91.4 kg (201 lb 6.4 oz)   ]    Nutrition Related Findings:    Labs: Na 127, Cr 1.87, -212-618. Meds: pepcid, iron, lantus, humalog, crestor, morphine, oxycodone, miralax. Edema: 1+ generalized and BUE, 4+ BLE. BM 8/1. Wound Type: Partial Thickness, Moisture Associate Skin Damage       Current Nutrition Intake & Therapies:    Average Meal Intake: %  Average Supplements Intake: None Ordered  ADULT DIET; Regular; 4 carb choices (60 gm/meal); 1200 ml  DIET ONE TIME MESSAGE;  DIET ONE TIME MESSAGE; Anthropometric Measures:  Height: 165.1 cm (5' 5\")  Ideal Body Weight (IBW): 125 lbs (57 kg)       Current Body Weight: 187 lb (84.8 kg), 149.6 % IBW. Weight Source: Stated  Current BMI (kg/m2): 31.1        Weight Adjustment For: No Adjustment                 BMI Categories: Obese Class 1 (BMI 30.0-34. 9)    Estimated Daily Nutrient Needs:  Energy Requirements Based On: Formula  Weight Used for Energy

## 2023-08-11 NOTE — PLAN OF CARE
Problem: Discharge Planning  Goal: Discharge to home or other facility with appropriate resources  Outcome: Progressing     Problem: Pain  Goal: Verbalizes/displays adequate comfort level or baseline comfort level  Outcome: Progressing  Flowsheets  Taken 8/10/2023 1524 by Marcell Isabel RN  Verbalizes/displays adequate comfort level or baseline comfort level: Encourage patient to monitor pain and request assistance  Taken 8/10/2023 1053 by Marcell Isabel RN  Verbalizes/displays adequate comfort level or baseline comfort level: Encourage patient to monitor pain and request assistance     Problem: Safety - Adult  Goal: Free from fall injury  Outcome: Progressing     Problem: Skin/Tissue Integrity  Goal: Absence of new skin breakdown  Description: 1. Monitor for areas of redness and/or skin breakdown  2. Assess vascular access sites hourly  3. Every 4-6 hours minimum:  Change oxygen saturation probe site  4. Every 4-6 hours:  If on nasal continuous positive airway pressure, respiratory therapy assess nares and determine need for appliance change or resting period.   Outcome: Progressing     Problem: ABCDS Injury Assessment  Goal: Absence of physical injury  Outcome: Progressing     Problem: Chronic Conditions and Co-morbidities  Goal: Patient's chronic conditions and co-morbidity symptoms are monitored and maintained or improved  Outcome: Progressing     Problem: Respiratory - Adult  Goal: Achieves optimal ventilation and oxygenation  Outcome: Progressing

## 2023-08-11 NOTE — CARE COORDINATION
Transition of Care Plan:     RUR: 29% (high RUR, readmission)  Prior Level of Functioning: Independent  Disposition: Home with follow ups. If SNF or IPR: Date FOC offered: N/A  Date FOC received: N/A  Accepting facility: N/A  Date authorization started with reference number: N/A  Date authorization received and expires: N/A  Follow up appointments: PCP/specialists if needed. DME needed: None. Patient has home oxygen 3L O2 NC tanks and concentrator through [employer]. Transportation at discharge: Family. IM/IMM Medicare/ letter given: 2nd IM needed prior to discharge. Is patient a  and connected with VA? No.              If yes, was [employer] transfer form completed and VA notified? N/A  Caregiver Contact: [facility] - [phone]  Discharge Caregiver contacted prior to discharge? Patient to contact. Care Conference needed?  No.  Barriers to discharge: nephro/cardiology clearance, Cr improvement        GUTIERREZ MirandaN, RN    Care Management  [phone]

## 2023-08-11 NOTE — PROGRESS NOTES
Bedside and Verbal shift change report [facility] (oncoming nurse) by Jaya Meza (offgoing nurse). Report included the following information Nurse Handoff Report, Index, Intake/Output, MAR, Recent Results, and Cardiac Rhythm NSR .        Please refer to Isidra Willard RN for documentation on this patient

## 2023-08-12 LAB
ALBUMIN SERPL-MCNC: 3.2 G/DL (ref 3.5–5)
ANION GAP SERPL CALC-SCNC: 8 MMOL/L (ref 5–15)
BUN SERPL-MCNC: 31 MG/DL (ref 6–20)
BUN/CREAT SERPL: 21 (ref 12–20)
CALCIUM SERPL-MCNC: 8.9 MG/DL (ref 8.5–10.1)
CHLORIDE SERPL-SCNC: 92 MMOL/L (ref 97–108)
CO2 SERPL-SCNC: 27 MMOL/L (ref 21–32)
CREAT SERPL-MCNC: 1.48 MG/DL (ref 0.55–1.02)
GLUCOSE BLD STRIP.AUTO-MCNC: 122 MG/DL (ref 65–117)
GLUCOSE BLD STRIP.AUTO-MCNC: 169 MG/DL (ref 65–117)
GLUCOSE BLD STRIP.AUTO-MCNC: 281 MG/DL (ref 65–117)
GLUCOSE BLD STRIP.AUTO-MCNC: 325 MG/DL (ref 65–117)
GLUCOSE SERPL-MCNC: 215 MG/DL (ref 65–100)
PHOSPHATE SERPL-MCNC: 5.3 MG/DL (ref 2.6–4.7)
POTASSIUM SERPL-SCNC: 3.1 MMOL/L (ref 3.5–5.1)
SERVICE CMNT-IMP: ABNORMAL
SODIUM SERPL-SCNC: 127 MMOL/L (ref 136–145)

## 2023-08-12 PROCEDURE — 2500000003 HC RX 250 WO HCPCS: Performed by: INTERNAL MEDICINE

## 2023-08-12 PROCEDURE — 6370000000 HC RX 637 (ALT 250 FOR IP): Performed by: INTERNAL MEDICINE

## 2023-08-12 PROCEDURE — 86334 IMMUNOFIX E-PHORESIS SERUM: CPT

## 2023-08-12 PROCEDURE — 84155 ASSAY OF PROTEIN SERUM: CPT

## 2023-08-12 PROCEDURE — 6370000000 HC RX 637 (ALT 250 FOR IP): Performed by: STUDENT IN AN ORGANIZED HEALTH CARE EDUCATION/TRAINING PROGRAM

## 2023-08-12 PROCEDURE — 2580000003 HC RX 258: Performed by: STUDENT IN AN ORGANIZED HEALTH CARE EDUCATION/TRAINING PROGRAM

## 2023-08-12 PROCEDURE — 84165 PROTEIN E-PHORESIS SERUM: CPT

## 2023-08-12 PROCEDURE — 36415 COLL VENOUS BLD VENIPUNCTURE: CPT

## 2023-08-12 PROCEDURE — 80069 RENAL FUNCTION PANEL: CPT

## 2023-08-12 PROCEDURE — 82962 GLUCOSE BLOOD TEST: CPT

## 2023-08-12 PROCEDURE — 6360000002 HC RX W HCPCS: Performed by: STUDENT IN AN ORGANIZED HEALTH CARE EDUCATION/TRAINING PROGRAM

## 2023-08-12 PROCEDURE — 82784 ASSAY IGA/IGD/IGG/IGM EACH: CPT

## 2023-08-12 PROCEDURE — 1100000003 HC PRIVATE W/ TELEMETRY

## 2023-08-12 RX ORDER — MORPHINE SULFATE 30 MG/1
30 TABLET, FILM COATED, EXTENDED RELEASE ORAL EVERY 12 HOURS SCHEDULED
Status: DISCONTINUED | OUTPATIENT
Start: 2023-08-12 | End: 2023-08-13 | Stop reason: HOSPADM

## 2023-08-12 RX ORDER — BUMETANIDE 0.25 MG/ML
2 INJECTION INTRAMUSCULAR; INTRAVENOUS EVERY 12 HOURS
Status: DISCONTINUED | OUTPATIENT
Start: 2023-08-12 | End: 2023-08-13 | Stop reason: HOSPADM

## 2023-08-12 RX ORDER — POTASSIUM CHLORIDE 20 MEQ/1
40 TABLET, EXTENDED RELEASE ORAL ONCE
Status: COMPLETED | OUTPATIENT
Start: 2023-08-12 | End: 2023-08-12

## 2023-08-12 RX ADMIN — SODIUM CHLORIDE, PRESERVATIVE FREE 10 ML: 5 INJECTION INTRAVENOUS at 09:55

## 2023-08-12 RX ADMIN — Medication 8 UNITS: at 13:46

## 2023-08-12 RX ADMIN — MORPHINE SULFATE 30 MG: 30 TABLET, FILM COATED, EXTENDED RELEASE ORAL at 10:15

## 2023-08-12 RX ADMIN — GABAPENTIN 300 MG: 300 CAPSULE ORAL at 21:29

## 2023-08-12 RX ADMIN — BUSPIRONE HYDROCHLORIDE 15 MG: 7.5 TABLET ORAL at 21:29

## 2023-08-12 RX ADMIN — SODIUM CHLORIDE, PRESERVATIVE FREE 5 ML: 5 INJECTION INTRAVENOUS at 21:30

## 2023-08-12 RX ADMIN — ROSUVASTATIN CALCIUM 40 MG: 40 TABLET, COATED ORAL at 09:55

## 2023-08-12 RX ADMIN — ARIPIPRAZOLE 30 MG: 5 TABLET ORAL at 09:51

## 2023-08-12 RX ADMIN — BUSPIRONE HYDROCHLORIDE 15 MG: 7.5 TABLET ORAL at 09:55

## 2023-08-12 RX ADMIN — Medication 4 UNITS: at 08:57

## 2023-08-12 RX ADMIN — ENOXAPARIN SODIUM 40 MG: 100 INJECTION SUBCUTANEOUS at 09:49

## 2023-08-12 RX ADMIN — GABAPENTIN 300 MG: 300 CAPSULE ORAL at 13:45

## 2023-08-12 RX ADMIN — INSULIN GLARGINE 40 UNITS: 100 INJECTION, SOLUTION SUBCUTANEOUS at 09:47

## 2023-08-12 RX ADMIN — Medication 6 UNITS: at 13:46

## 2023-08-12 RX ADMIN — BREXPIPRAZOLE 1 MG: 1 TABLET ORAL at 09:50

## 2023-08-12 RX ADMIN — ASPIRIN 81 MG: 81 TABLET, COATED ORAL at 09:47

## 2023-08-12 RX ADMIN — BUMETANIDE 2 MG: 0.25 INJECTION, SOLUTION INTRAMUSCULAR; INTRAVENOUS at 09:51

## 2023-08-12 RX ADMIN — POTASSIUM CHLORIDE 40 MEQ: 1500 TABLET, EXTENDED RELEASE ORAL at 09:55

## 2023-08-12 RX ADMIN — BUMETANIDE 2 MG: 0.25 INJECTION, SOLUTION INTRAMUSCULAR; INTRAVENOUS at 21:29

## 2023-08-12 RX ADMIN — Medication 8 UNITS: at 08:57

## 2023-08-12 RX ADMIN — FAMOTIDINE 40 MG: 20 TABLET, FILM COATED ORAL at 09:49

## 2023-08-12 RX ADMIN — GABAPENTIN 300 MG: 300 CAPSULE ORAL at 09:49

## 2023-08-12 RX ADMIN — Medication 8 UNITS: at 18:30

## 2023-08-12 RX ADMIN — MORPHINE SULFATE 30 MG: 30 TABLET, FILM COATED, EXTENDED RELEASE ORAL at 21:29

## 2023-08-12 ASSESSMENT — PAIN - FUNCTIONAL ASSESSMENT: PAIN_FUNCTIONAL_ASSESSMENT: NONE - DENIES PAIN

## 2023-08-12 ASSESSMENT — PAIN SCALES - GENERAL
PAINLEVEL_OUTOF10: 0
PAINLEVEL_OUTOF10: 4

## 2023-08-12 ASSESSMENT — PAIN DESCRIPTION - ORIENTATION: ORIENTATION: MID

## 2023-08-12 ASSESSMENT — PAIN DESCRIPTION - DESCRIPTORS: DESCRIPTORS: ACHING

## 2023-08-12 ASSESSMENT — PAIN DESCRIPTION - LOCATION: LOCATION: ABDOMEN

## 2023-08-12 NOTE — PROGRESS NOTES
NON critical care TIME:  37  Minutes    Total CRITICAL CARE TIME Spent:   Minutes non procedure based      Comments   >50% of visit spent in counseling and coordination of care     ________________________________________________________________________  Douglas Lemon MD     Procedures: see electronic medical records for all procedures/Xrays and details which were not copied into this note but were reviewed prior to creation of Plan. LABS:  I reviewed today's most current labs and imaging studies. Pertinent labs include:  No results for input(s): WBC, HGB, HCT, PLT in the last 72 hours.   Recent Labs     08/10/23  0554 08/11/23  0531 08/12/23  0251   * 127* 127*   K 3.7 3.7 3.1*   CL 90* 90* 92*   CO2 30 31 27   GLUCOSE 327* 432* 215*   BUN 25* 34* 31*   CREATININE 1.58* 1.87* 1.48*   CALCIUM 9.4 9.1 8.9   PHOS 6.3* 7.1* 5.3*   LABALBU 3.6 3.2* 3.2*         Signed: Douglas Lemon MD

## 2023-08-12 NOTE — PROGRESS NOTES
2030 Patient stated she felt like she had to pee and was having trouble peeing. Bladder scan showed 239/246 ml.

## 2023-08-12 NOTE — PROGRESS NOTES
Spiritual Care Assessment/Progress Note  Ashley    Name: Darwin Ortiz MRN: [id number]    Age: 47 y.o. Sex: female   Language: English     Date: 8/12/2023            Total Time Calculated: 35 min              Spiritual Assessment begun in [facility] [address]  Service Provided For[de-identified] Patient  Referral/Consult From[de-identified] Rounding  Encounter Overview/Reason : Initial Encounter    Spiritual beliefs:      [] Involved in a tee tradition/spiritual practice:      [] Supported by a tee community:      [] Claims no spiritual orientation:      [] Seeking spiritual identity:           [x] Adheres to an individual form of spirituality:      [] Not able to assess:                Identified resources for coping and support system:   Support System: Significant other, Friends/neighbors       [x] Prayer                  [] Devotional reading               [] Music                  [] Guided Imagery     [] Pet visits                                        [] Other: (COMMENT)     Specific area/focus of visit   Encounter:    Crisis:    Spiritual/Emotional needs: Type: Spiritual Support  Ritual, Rites and Sacraments: Type: Sacrament of Sick  Grief, Loss, and Adjustments:    Ethics/Mediation:    Behavioral Health:    Palliative Care: Advance Care Planning:      Plan/Referrals: Continue Support (comment), Referred to (Comment) (Music Therapy)    Narrative:    Reviewed chart prior to visit on [facility] unit for spiritual assessment. No family/friends present. Patient was seated in recliner and shared she is feeling tired this afternoon. Provided supportive listening presence as she spoke about events that led to hospitalization. Her support system seems rather limited; her boyfriend, her friend Socrates Finch, and a  who she met some years ago. She added she does not go to the 's Spiritism-it seems he checks in on her periodically and she calls him if there is a need.   She shared her

## 2023-08-13 VITALS
TEMPERATURE: 98.5 F | SYSTOLIC BLOOD PRESSURE: 123 MMHG | WEIGHT: 187 LBS | RESPIRATION RATE: 18 BRPM | OXYGEN SATURATION: 89 % | BODY MASS INDEX: 31.16 KG/M2 | HEART RATE: 109 BPM | DIASTOLIC BLOOD PRESSURE: 67 MMHG | HEIGHT: 65 IN

## 2023-08-13 LAB
ANION GAP SERPL CALC-SCNC: 7 MMOL/L (ref 5–15)
BUN SERPL-MCNC: 31 MG/DL (ref 6–20)
BUN/CREAT SERPL: 19 (ref 12–20)
CALCIUM SERPL-MCNC: 8.5 MG/DL (ref 8.5–10.1)
CHLORIDE SERPL-SCNC: 94 MMOL/L (ref 97–108)
CO2 SERPL-SCNC: 23 MMOL/L (ref 21–32)
CREAT SERPL-MCNC: 1.61 MG/DL (ref 0.55–1.02)
GLUCOSE BLD STRIP.AUTO-MCNC: 260 MG/DL (ref 65–117)
GLUCOSE BLD STRIP.AUTO-MCNC: 359 MG/DL (ref 65–117)
GLUCOSE SERPL-MCNC: 351 MG/DL (ref 65–100)
POTASSIUM SERPL-SCNC: 4 MMOL/L (ref 3.5–5.1)
SERVICE CMNT-IMP: ABNORMAL
SERVICE CMNT-IMP: ABNORMAL
SODIUM SERPL-SCNC: 124 MMOL/L (ref 136–145)

## 2023-08-13 PROCEDURE — 80048 BASIC METABOLIC PNL TOTAL CA: CPT

## 2023-08-13 PROCEDURE — 6370000000 HC RX 637 (ALT 250 FOR IP): Performed by: INTERNAL MEDICINE

## 2023-08-13 PROCEDURE — 2500000003 HC RX 250 WO HCPCS: Performed by: INTERNAL MEDICINE

## 2023-08-13 PROCEDURE — 6360000002 HC RX W HCPCS: Performed by: STUDENT IN AN ORGANIZED HEALTH CARE EDUCATION/TRAINING PROGRAM

## 2023-08-13 PROCEDURE — 2580000003 HC RX 258: Performed by: STUDENT IN AN ORGANIZED HEALTH CARE EDUCATION/TRAINING PROGRAM

## 2023-08-13 PROCEDURE — 82962 GLUCOSE BLOOD TEST: CPT

## 2023-08-13 PROCEDURE — 36415 COLL VENOUS BLD VENIPUNCTURE: CPT

## 2023-08-13 PROCEDURE — 6370000000 HC RX 637 (ALT 250 FOR IP): Performed by: STUDENT IN AN ORGANIZED HEALTH CARE EDUCATION/TRAINING PROGRAM

## 2023-08-13 RX ORDER — INSULIN GLARGINE 100 [IU]/ML
INJECTION, SOLUTION SUBCUTANEOUS
Qty: 10 ML | Refills: 3 | Status: SHIPPED
Start: 2023-08-13 | End: 2023-08-17

## 2023-08-13 RX ORDER — BUMETANIDE 2 MG/1
2 TABLET ORAL 2 TIMES DAILY
Qty: 60 TABLET | Refills: 0 | Status: SHIPPED | OUTPATIENT
Start: 2023-08-13

## 2023-08-13 RX ADMIN — Medication 8 UNITS: at 12:40

## 2023-08-13 RX ADMIN — BUSPIRONE HYDROCHLORIDE 15 MG: 7.5 TABLET ORAL at 09:18

## 2023-08-13 RX ADMIN — Medication 8 UNITS: at 09:16

## 2023-08-13 RX ADMIN — Medication 4 UNITS: at 09:16

## 2023-08-13 RX ADMIN — ASPIRIN 81 MG: 81 TABLET, COATED ORAL at 09:18

## 2023-08-13 RX ADMIN — BREXPIPRAZOLE 1 MG: 1 TABLET ORAL at 09:17

## 2023-08-13 RX ADMIN — FAMOTIDINE 40 MG: 20 TABLET, FILM COATED ORAL at 09:18

## 2023-08-13 RX ADMIN — ARIPIPRAZOLE 30 MG: 5 TABLET ORAL at 09:17

## 2023-08-13 RX ADMIN — ROSUVASTATIN CALCIUM 40 MG: 40 TABLET, COATED ORAL at 09:19

## 2023-08-13 RX ADMIN — SODIUM CHLORIDE, PRESERVATIVE FREE 5 ML: 5 INJECTION INTRAVENOUS at 11:34

## 2023-08-13 RX ADMIN — BUMETANIDE 2 MG: 0.25 INJECTION, SOLUTION INTRAMUSCULAR; INTRAVENOUS at 09:18

## 2023-08-13 RX ADMIN — MORPHINE SULFATE 30 MG: 30 TABLET, FILM COATED, EXTENDED RELEASE ORAL at 09:29

## 2023-08-13 RX ADMIN — GABAPENTIN 300 MG: 300 CAPSULE ORAL at 09:18

## 2023-08-13 RX ADMIN — Medication 6 UNITS: at 12:39

## 2023-08-13 RX ADMIN — ENOXAPARIN SODIUM 40 MG: 100 INJECTION SUBCUTANEOUS at 09:17

## 2023-08-13 RX ADMIN — INSULIN GLARGINE 40 UNITS: 100 INJECTION, SOLUTION SUBCUTANEOUS at 09:15

## 2023-08-13 ASSESSMENT — PAIN SCALES - GENERAL
PAINLEVEL_OUTOF10: 0

## 2023-08-13 NOTE — PROGRESS NOTES
1930- Pt refuses bed alarm. Ambulating in room. MS decreased as pt sleepy with psych meds. Awake and amb then slouched and sleeping in bed, diff to arouse. Elio lwr ext edema +4, tight, elevated while in bed. 02 applied while sleeping as sats drop to mid 80's on RA. Pt noncompliant with fluid intake. Dr Sofia Allen aware.

## 2023-08-13 NOTE — CARE COORDINATION
08/13/23 [facility] Discharge   Transition of Care Consult (CM Consult) Discharge [facility] Discharge None   [place] Resource Information Provided? No   Mode of Transport at Discharge Other (see comment)  (Round Trip)   Confirm Follow Up Transport Family   Condition of Participation: Discharge Planning   The Plan for Transition of Care is related to the following treatment goals: N/A no services indicated   The Patient and/or Patient Representative was provided with a Choice of Provider? Patient   The Patient and/Or Patient Representative agree with the Discharge Plan? Yes   Freedom of Choice list was provided with basic dialogue that supports the patient's individualized plan of care/goals, treatment preferences, and shares the quality data associated with the providers? No       CM aware of discharge order. 2nd IMM given. Pt unable to secure own ride home today and will need transportation arranged by nursing supervisor (Round Trip). Informed assigned RN. Cleared for d/c from CM standpoint.     BARBRA Calderon   Care Manager, [address] [place] [address]

## 2023-08-13 NOTE — PROGRESS NOTES
0700: Bedside shift change report given to Norma RN (oncoming nurse) by Teralexx Pichardo RN (offgoing nurse). Report included the following information Nurse Handoff Report and Index. 1202: Patient Na+ level 124. Notified Dr. Chris Artis to clarify discharge orders. 1340: Spoke with Dr Nimo Michel who cleared patient for discharge today. Aware of recent bg & na level this morning of 124.

## 2023-08-13 NOTE — DISCHARGE INSTRUCTIONS
Please follow up with endocrinology- your blood sugar are still uncontrolled. Please discuss with your doctor regarding titrating your pain medications- you are on high dose of pain medications.    Please follow up with PCP and nephrology

## 2023-08-15 ENCOUNTER — HOSPITAL ENCOUNTER (EMERGENCY)
Facility: HOSPITAL | Age: 53
Discharge: HOME | End: 2023-08-15
Attending: EMERGENCY MEDICINE
Payer: MEDICARE

## 2023-08-15 VITALS
HEART RATE: 91 BPM | DIASTOLIC BLOOD PRESSURE: 70 MMHG | SYSTOLIC BLOOD PRESSURE: 156 MMHG | RESPIRATION RATE: 18 BRPM | HEIGHT: 65 IN | TEMPERATURE: 97.8 F | OXYGEN SATURATION: 93 % | BODY MASS INDEX: 31.12 KG/M2

## 2023-08-15 DIAGNOSIS — E11.65 HYPERGLYCEMIA DUE TO DIABETES MELLITUS (HCC): Primary | ICD-10-CM

## 2023-08-15 LAB
ALBUMIN SERPL ELPH-MCNC: 3.7 G/DL (ref 2.9–4.4)
ALBUMIN SERPL ELPH-MCNC: 3.7 G/DL (ref 2.9–4.4)
ALBUMIN SERPL-MCNC: 3.3 G/DL (ref 3.5–5)
ALBUMIN/GLOB SERPL: 1.1 (ref 1.1–2.2)
ALBUMIN/GLOB SERPL: 1.3 (ref 0.7–1.7)
ALBUMIN/GLOB SERPL: 1.3 (ref 0.7–1.7)
ALP SERPL-CCNC: 87 U/L (ref 45–117)
ALPHA1 GLOB SERPL ELPH-MCNC: 0.3 G/DL (ref 0–0.4)
ALPHA1 GLOB SERPL ELPH-MCNC: 0.3 G/DL (ref 0–0.4)
ALPHA2 GLOB SERPL ELPH-MCNC: 0.7 G/DL (ref 0.4–1)
ALPHA2 GLOB SERPL ELPH-MCNC: 0.7 G/DL (ref 0.4–1)
ALT SERPL-CCNC: 21 U/L (ref 12–78)
ANION GAP SERPL CALC-SCNC: 7 MMOL/L (ref 5–15)
AST SERPL-CCNC: 19 U/L (ref 15–37)
B-GLOBULIN SERPL ELPH-MCNC: 1 G/DL (ref 0.7–1.3)
B-GLOBULIN SERPL ELPH-MCNC: 1 G/DL (ref 0.7–1.3)
BASOPHILS # BLD: 0 K/UL (ref 0–0.1)
BASOPHILS NFR BLD: 0 % (ref 0–1)
BILIRUB SERPL-MCNC: 0.4 MG/DL (ref 0.2–1)
BUN SERPL-MCNC: 34 MG/DL (ref 6–20)
BUN/CREAT SERPL: 20 (ref 12–20)
CALCIUM SERPL-MCNC: 9 MG/DL (ref 8.5–10.1)
CHLORIDE SERPL-SCNC: 91 MMOL/L (ref 97–108)
CO2 SERPL-SCNC: 30 MMOL/L (ref 21–32)
COMMENT:: NORMAL
CREAT SERPL-MCNC: 1.69 MG/DL (ref 0.55–1.02)
DIFFERENTIAL METHOD BLD: ABNORMAL
EOSINOPHIL # BLD: 0 K/UL (ref 0–0.4)
EOSINOPHIL NFR BLD: 1 % (ref 0–7)
ERYTHROCYTE [DISTWIDTH] IN BLOOD BY AUTOMATED COUNT: 25.2 % (ref 11.5–14.5)
GAMMA GLOB SERPL ELPH-MCNC: 0.9 G/DL (ref 0.4–1.8)
GAMMA GLOB SERPL ELPH-MCNC: 1 G/DL (ref 0.4–1.8)
GLOBULIN SER CALC-MCNC: 2.8 G/DL (ref 2.2–3.9)
GLOBULIN SER CALC-MCNC: 3.1 G/DL (ref 2–4)
GLOBULIN SER-MCNC: 2.9 G/DL (ref 2.2–3.9)
GLUCOSE SERPL-MCNC: 208 MG/DL (ref 65–100)
HCT VFR BLD AUTO: 25.9 % (ref 35–47)
HGB BLD-MCNC: 7.9 G/DL (ref 11.5–16)
IGA SERPL-MCNC: 94 MG/DL (ref 87–352)
IGG SERPL-MCNC: 859 MG/DL (ref 586–1602)
IGM SERPL-MCNC: 148 MG/DL (ref 26–217)
IMM GRANULOCYTES # BLD AUTO: 0 K/UL (ref 0–0.04)
IMM GRANULOCYTES NFR BLD AUTO: 0 % (ref 0–0.5)
INTERPRETATION SERPL IEP-IMP: NORMAL
LYMPHOCYTES # BLD: 1.8 K/UL (ref 0.8–3.5)
LYMPHOCYTES NFR BLD: 26 % (ref 12–49)
M PROTEIN SERPL ELPH-MCNC: NORMAL G/DL
M PROTEIN SERPL ELPH-MCNC: NORMAL G/DL
MCH RBC QN AUTO: 21.9 PG (ref 26–34)
MCHC RBC AUTO-ENTMCNC: 30.5 G/DL (ref 30–36.5)
MCV RBC AUTO: 71.7 FL (ref 80–99)
MONOCYTES # BLD: 0.7 K/UL (ref 0–1)
MONOCYTES NFR BLD: 10 % (ref 5–13)
NEUTS SEG # BLD: 4.5 K/UL (ref 1.8–8)
NEUTS SEG NFR BLD: 63 % (ref 32–75)
NRBC # BLD: 0 K/UL (ref 0–0.01)
NRBC BLD-RTO: 0 PER 100 WBC
PLATELET # BLD AUTO: 349 K/UL (ref 150–400)
PMV BLD AUTO: 9 FL (ref 8.9–12.9)
POTASSIUM SERPL-SCNC: 3.8 MMOL/L (ref 3.5–5.1)
PROT SERPL-MCNC: 6.4 G/DL (ref 6.4–8.2)
PROT SERPL-MCNC: 6.5 G/DL (ref 6–8.5)
PROT SERPL-MCNC: 6.6 G/DL (ref 6–8.5)
RBC # BLD AUTO: 3.61 M/UL (ref 3.8–5.2)
SODIUM SERPL-SCNC: 128 MMOL/L (ref 136–145)
SPECIMEN HOLD: NORMAL
WBC # BLD AUTO: 7.1 K/UL (ref 3.6–11)

## 2023-08-15 PROCEDURE — 99284 EMERGENCY DEPT VISIT MOD MDM: CPT

## 2023-08-15 PROCEDURE — 80053 COMPREHEN METABOLIC PANEL: CPT

## 2023-08-15 PROCEDURE — 85025 COMPLETE CBC W/AUTO DIFF WBC: CPT

## 2023-08-15 PROCEDURE — 36415 COLL VENOUS BLD VENIPUNCTURE: CPT

## 2023-08-15 NOTE — DISCHARGE INSTRUCTIONS
Your evaluated today with concern that he took too much insulin. After reviewing your recent discharge notes that this is what your current insulin regimen should be:   - 10 units of insulin lispro before meals with an additional 2 units for every 50 mg/dL that glucose is above 150 mg/dL. - 40 units of insulin glargine at night   Your glucose on arrival to the emergency department, after you had given yourself the 20 units of insulin at lunch, returned at 208. You are not found to be hypoglycemic. Please continue to monitor your sugar with every meal, please follow-up with your endocrinologist as instructed at your most recent hospital discharge. Please make an appointment with your primary care physician for follow-up from this emergency department visit for continued glucose monitoring. Please return to the emergency department if you develop any new or worsening symptoms such as lightheadedness, feeling weak or woozy, dizziness, numbness in your hands or feet, facial droop, change in vision, chest pain, shortness of breath, confusion, any other concerning symptoms.

## 2023-08-15 NOTE — ED PROVIDER NOTES
hospitals EMERGENCY DEPT  EMERGENCY DEPARTMENT ENCOUNTER       Pt Name: Arnold Mcknight  MRN: [id number]  [address] 1970  Date of evaluation: 8/15/2023  Provider: Nikky Aponte MD   PCP: George Nicholson MD  Note Started: 4:45 PM EDT 8/15/23     CHIEF COMPLAINT       Chief Complaint   Patient presents with    Hyperglycemia     Patient arrives from home with EMS today for hyperglycemia; reporting it to be >300. EMS checked;         HISTORY OF PRESENT ILLNESS: 1 or more elements      History From: Patient, History limited by: none     Arnold Mcknight is a 47 y.o. female with past medical history pertinent for bilateral upper extremity tremors, chronic pain from endometriosis, hypertension, anemia of CKD, chronic diastolic heart failure, type 1 diabetes mellitus who presents today to have her glucose checked after taking 20 units of her short acting insulin because her sugar was 390 prior to lunch. Patient states she is scared that she took too much insulin, did not check her insulin again at home, wanted to come in to make sure that she was okay. Patient otherwise asymptomatic, denying chest pain, shortness of breath, headache, change in vision, difficulty swallowing, ringing in ears, numbness or tingling of hands or feet, weakness of arms or legs, abdominal pain, nausea, vomiting, diaphoresis, chills. When asked about her typical insulin regimen, she states that she was recently admitted to the hospital where there was an argument regarding what insulin she should be taking in the outpatient setting. She wanted to take 20 of her long-acting, they told her to take 30 of her long-acting. She states that she currently takes 20 units of her long-acting insulin in the morning and 10 units of short acting insulin with each meal.  Took the 20 units this afternoon because her blood sugar was 240 mg/dL above where she was told it should be (150 mg/dl).     Per Chart Review:   Patient recently admitted to [facility]

## 2023-08-15 NOTE — ED NOTES
Discharge medications reviewed with the patient and appropriate educational materials and side effects teaching were provided.        Marv Malik RN  08/15/23 2447

## 2023-08-16 ENCOUNTER — TELEPHONE (OUTPATIENT)
Age: 53
End: 2023-08-16

## 2023-08-16 NOTE — TELEPHONE ENCOUNTER
Pt LVM asking for an appt to see Dr Argenis Trujillo as she was in the hospital the day of her previously  scheduled appt.

## 2023-08-17 ENCOUNTER — OFFICE VISIT (OUTPATIENT)
Age: 53
End: 2023-08-17
Payer: MEDICARE

## 2023-08-17 VITALS
WEIGHT: 183 LBS | HEIGHT: 65 IN | BODY MASS INDEX: 30.49 KG/M2 | DIASTOLIC BLOOD PRESSURE: 56 MMHG | HEART RATE: 95 BPM | SYSTOLIC BLOOD PRESSURE: 115 MMHG

## 2023-08-17 DIAGNOSIS — E10.65 TYPE 1 DIABETES MELLITUS WITH HYPERGLYCEMIA (HCC): Primary | ICD-10-CM

## 2023-08-17 DIAGNOSIS — I10 ESSENTIAL (PRIMARY) HYPERTENSION: ICD-10-CM

## 2023-08-17 DIAGNOSIS — E78.2 MIXED HYPERLIPIDEMIA: ICD-10-CM

## 2023-08-17 DIAGNOSIS — R79.89 ELEVATED LFTS: ICD-10-CM

## 2023-08-17 DIAGNOSIS — R94.6 ABNORMAL RESULTS OF THYROID FUNCTION STUDIES: ICD-10-CM

## 2023-08-17 PROCEDURE — 4004F PT TOBACCO SCREEN RCVD TLK: CPT | Performed by: INTERNAL MEDICINE

## 2023-08-17 PROCEDURE — 3017F COLORECTAL CA SCREEN DOC REV: CPT | Performed by: INTERNAL MEDICINE

## 2023-08-17 PROCEDURE — 2022F DILAT RTA XM EVC RTNOPTHY: CPT | Performed by: INTERNAL MEDICINE

## 2023-08-17 PROCEDURE — 3052F HG A1C>EQUAL 8.0%<EQUAL 9.0%: CPT | Performed by: INTERNAL MEDICINE

## 2023-08-17 PROCEDURE — G8427 DOCREV CUR MEDS BY ELIG CLIN: HCPCS | Performed by: INTERNAL MEDICINE

## 2023-08-17 PROCEDURE — 3078F DIAST BP <80 MM HG: CPT | Performed by: INTERNAL MEDICINE

## 2023-08-17 PROCEDURE — 3074F SYST BP LT 130 MM HG: CPT | Performed by: INTERNAL MEDICINE

## 2023-08-17 PROCEDURE — 99214 OFFICE O/P EST MOD 30 MIN: CPT | Performed by: INTERNAL MEDICINE

## 2023-08-17 PROCEDURE — 1111F DSCHRG MED/CURRENT MED MERGE: CPT | Performed by: INTERNAL MEDICINE

## 2023-08-17 PROCEDURE — G8417 CALC BMI ABV UP PARAM F/U: HCPCS | Performed by: INTERNAL MEDICINE

## 2023-08-17 RX ORDER — INSULIN GLARGINE 100 [IU]/ML
INJECTION, SOLUTION SUBCUTANEOUS
Qty: 10 ML | Refills: 3
Start: 2023-08-17

## 2023-08-17 NOTE — PROGRESS NOTES
Chief Complaint   Patient presents with    Diabetes     PCP and pharmacy confirmed      History of Present Illness: Salvador Brown is a 47 y.o. female here for follow up of diabetes. Weight down 18 lbs since last visit in 6/23. She has been admitted 4 times since last visit for hyponatremia and volume overload and has had a lot of fluctuation in her sugars. She doesn't have her meter or any readings with her today so we agreed to have her come back in 12 days to review everything to see what changes need to be made. Still having a lot of edema despite the bumex 2 mg bid. Current Outpatient Medications   Medication Sig    insulin glargine (LANTUS) 100 UNIT/ML injection vial Inject 36 units in the AM--Dose change 08/17/23--updated med list--did not send prescription to the pharmacy    bumetanide (BUMEX) 2 MG tablet Take 1 tablet by mouth 2 times daily    Glucagon, rDNA, (GLUCAGON EMERGENCY) 1 MG KIT USE AS DIRECTED IN KIT INSTRUCTIONS    Insulin Pen Needle (B-D ULTRAFINE III SHORT PEN) 31G X 8 MM MISC 1 each by Does not apply route 3 times daily Pharmacist to identify & substitute with preferred needle for insulin pen device. insulin lispro (HUMALOG) 100 UNIT/ML SOLN injection vial Inject 10 units before meals + 2 units for every 50 mg/dl above 150 mg/dl--put back on list 06/30/23    ALPRAZolam (XANAX) 2 MG tablet Take 1 tablet by mouth 3 times daily as needed for Anxiety. ARIPiprazole (ABILIFY) 30 MG tablet Take 1 tablet by mouth daily    esomeprazole Magnesium (NEXIUM) 40 MG PACK Take 1 packet by mouth daily    Morphine Sulfate  MG T12A Take 100 mg by mouth in the morning and at bedtime.  Max Daily Amount: 200 mg    albuterol sulfate HFA (PROVENTIL;VENTOLIN;PROAIR) 108 (90 Base) MCG/ACT inhaler Inhale 2 puffs into the lungs every 6 hours as needed    aspirin 81 MG EC tablet Take 1 tablet by mouth daily    brexpiprazole (REXULTI) 0.5 MG TABS tablet Take 2 tablets by mouth daily    busPIRone

## 2023-08-17 NOTE — PATIENT INSTRUCTIONS
1) Dose your humalog based on the scale below if you are eating:  Blood sugar  Insulin dose          Less than 90  Eat first, 8 units       10 units    151-200  12 units      201-250  14 units     251-300  16 units      301-350  18 units      351-400  20 units  401-450  22 units  451-500  24 units  Over 500  26 units    2) If your sugar is over 200 and you are not eating, you can dose humalog on the scale below:  Blood sugar  Insulin dose          201-250  4 units     251-300  6 units      301-350  8 units      351-400  10 units  401-450  12 units  451-500  14 units  Over 500  16 units    3) Stay on 36 units of lantus in the morning. 4) Bring your meter and readings to your next visit.

## 2023-08-18 DIAGNOSIS — E10.65 TYPE 1 DIABETES MELLITUS WITH HYPERGLYCEMIA (HCC): Primary | ICD-10-CM

## 2023-08-18 RX ORDER — BLOOD SUGAR DIAGNOSTIC
STRIP MISCELLANEOUS
Qty: 900 EACH | Refills: 3 | Status: CANCELLED | OUTPATIENT
Start: 2023-08-18

## 2023-08-18 NOTE — TELEPHONE ENCOUNTER
Pt states she is using the [employer] brand test strips and would like a RX sent to [facility].

## 2023-08-21 DIAGNOSIS — E10.65 TYPE 1 DIABETES MELLITUS WITH HYPERGLYCEMIA (HCC): ICD-10-CM

## 2023-08-21 NOTE — TELEPHONE ENCOUNTER
Pt asked that her test strips be sent to [address] [place] [address],[address] [facility] be easier for her to pick it up from that location.

## 2023-08-22 ENCOUNTER — TELEPHONE (OUTPATIENT)
Age: 53
End: 2023-08-22

## 2023-08-23 NOTE — TELEPHONE ENCOUNTER
Please call her and ask if she requested Meena to fax us a form to change her humalog from vials to pens. I'm happy to approve this if she prefers to make a change to pens but if she prefers to stay on vials, then I will fax a form letting Rhonda Lai know this.

## 2023-08-23 NOTE — PROGRESS NOTES
Physician Progress Note      PATIENT:               Mono Freedman  CSN #:                  [id number]  :                       1970  ADMIT DATE:       8/3/2023 5:39 PM  [facility] DATE:        2023 4:09 PM  RESPONDING  PROVIDER #:        Ashley Hahn MD          QUERY TEXT:    Patient admitted with headache and cramps. Noted documentation of \"Acute on   chronic hyponatremia\" in H&P dated 8/3 and \"Pseudohyponatremia\" in DC Summary   dated . If possible, please document in progress notes and discharge summary if you   are evaluating and /or treating any of the following: The medical record reflects the following:  Risk Factors: Hx: CKD: IDDM; HTN. ..... C/o Headache/ Cramps  Clinical Indicators: Na+: 125,  130, 129, 126, 123, 122, 123, 125, 126, 125,   132, 128, 127, 124    8/3 AP: Acute on chronic hyponatremia  Headaches  Diastolic heart failure without acute exacerbation  PERLA on CKD 3  Anemia of chronic disease     Neprho PN: Hyponatremia (from poor solute intake, hypervolemia), acute on   chronic     Nephro PN: Hyponatremia (from poor solute intake, hypervolemia), acute on   chronic     IM PN: : Patient has a very delicate fluid and sodium balance. Appreciate nephrology input. Patient started on 2 mg of Bumex twice daily. Also involving cardiology to assess fluid/volume status and patient may likely   need right heart cath for that.  Cards PN: ?  Elevated left and right sided filling pressures  Elevated pulmonary pressures  ?    Cards PN:  Hx of hyponatremia with SSRI's and metolazone in the past    Treatment: Monitor Na+; Tele monitoring; Nephro consult; Start salt tablets;   Continue 1.5 L fluid restriction  Hold PTA Bumex-patient appears intravascularly depleted; Cards consult; RHC    Thank you,    Chris Corado  CDI  Options provided:  -- Acute on chronic hyponatremia confirmed and Pseudohyponatremia ruled out  -- Pseudohyponatremia confirmed and Acute on chronic

## 2023-08-24 NOTE — TELEPHONE ENCOUNTER
[address] [facility].  Please fax the form back to [facility] stating that I will not be changing her from vials to pens. Thanks.

## 2023-08-29 ENCOUNTER — OFFICE VISIT (OUTPATIENT)
Age: 53
End: 2023-08-29
Payer: MEDICARE

## 2023-08-29 VITALS
DIASTOLIC BLOOD PRESSURE: 48 MMHG | WEIGHT: 189 LBS | BODY MASS INDEX: 31.49 KG/M2 | HEART RATE: 94 BPM | SYSTOLIC BLOOD PRESSURE: 123 MMHG | HEIGHT: 65 IN

## 2023-08-29 DIAGNOSIS — E78.2 MIXED HYPERLIPIDEMIA: ICD-10-CM

## 2023-08-29 DIAGNOSIS — R94.6 ABNORMAL RESULTS OF THYROID FUNCTION STUDIES: ICD-10-CM

## 2023-08-29 DIAGNOSIS — R79.89 ELEVATED LFTS: ICD-10-CM

## 2023-08-29 DIAGNOSIS — I10 ESSENTIAL (PRIMARY) HYPERTENSION: ICD-10-CM

## 2023-08-29 DIAGNOSIS — E10.65 TYPE 1 DIABETES MELLITUS WITH HYPERGLYCEMIA (HCC): Primary | ICD-10-CM

## 2023-08-29 PROCEDURE — 4004F PT TOBACCO SCREEN RCVD TLK: CPT | Performed by: INTERNAL MEDICINE

## 2023-08-29 PROCEDURE — 3078F DIAST BP <80 MM HG: CPT | Performed by: INTERNAL MEDICINE

## 2023-08-29 PROCEDURE — G8417 CALC BMI ABV UP PARAM F/U: HCPCS | Performed by: INTERNAL MEDICINE

## 2023-08-29 PROCEDURE — 1111F DSCHRG MED/CURRENT MED MERGE: CPT | Performed by: INTERNAL MEDICINE

## 2023-08-29 PROCEDURE — 2022F DILAT RTA XM EVC RTNOPTHY: CPT | Performed by: INTERNAL MEDICINE

## 2023-08-29 PROCEDURE — 3052F HG A1C>EQUAL 8.0%<EQUAL 9.0%: CPT | Performed by: INTERNAL MEDICINE

## 2023-08-29 PROCEDURE — 99214 OFFICE O/P EST MOD 30 MIN: CPT | Performed by: INTERNAL MEDICINE

## 2023-08-29 PROCEDURE — 3074F SYST BP LT 130 MM HG: CPT | Performed by: INTERNAL MEDICINE

## 2023-08-29 PROCEDURE — G8427 DOCREV CUR MEDS BY ELIG CLIN: HCPCS | Performed by: INTERNAL MEDICINE

## 2023-08-29 PROCEDURE — 3017F COLORECTAL CA SCREEN DOC REV: CPT | Performed by: INTERNAL MEDICINE

## 2023-08-29 RX ORDER — BUMETANIDE 2 MG/1
3 TABLET ORAL 2 TIMES DAILY
Status: ON HOLD | COMMUNITY

## 2023-08-29 RX ORDER — INSULIN GLARGINE 100 [IU]/ML
INJECTION, SOLUTION SUBCUTANEOUS
Qty: 20 ML | Refills: 11 | Status: ON HOLD | OUTPATIENT
Start: 2023-08-29

## 2023-08-29 RX ORDER — INSULIN LISPRO 100 [IU]/ML
INJECTION, SOLUTION INTRAVENOUS; SUBCUTANEOUS
Qty: 20 ML | Refills: 11 | Status: ON HOLD | OUTPATIENT
Start: 2023-08-29

## 2023-08-29 NOTE — PROGRESS NOTES
October 2011 up from 8.7% in May down from 9.7% in March up from 9.1% in July 2010 down from 10.2% in January down from 13.7% in October 2009. Want her A1c under 8% to have hysterectomy. Her sugars are very high recently so increased her lantus and her humalog.  - increase lantus to 42 units in the morning  - dose humalog 12 units before meals + 2 units for every 50 mg/dl above 150 mg/dl  - check bs 10 times daily due to fluctuating blood sugars  - foot exam done 6/22  - microalbumin was 69 in 7/10 up to 392 in March 2011 and 621 in May down to 216 in October 2011 and 37.3 in 4/14 and up to 183 in 12/15 and 300 in 9/16, down to 85 in 4/18 and 10.2 in 9/19 and normal ever since, last in 1/23  - optho UTD 1/21  - check Hgb A1c, cmp, and microalbumin prior to next visit        2) HTN NOS (401.9): Blood pressure was at goal < 140/90 off coreg and damari   - cont bumex 2 mg 1.5 tabs bid      3) Hyperlipidemia (272.4): Given DM, goal LDL is < 100, non-HDL < 130, and TGs < 150. LDL was 146 in January 2010, down to 124 in July and 93 in March 2011 with taking 5 mg of crestor daily. Her crestor was changed to pravastatin by Dr. Jose Thompson because of cost in Feb 2012. LDL 93 in 8/12 but her HDL was high at 138 due to ETOH intake. LDL down to 39 in 11/12 but up to 102 in 3/13. Off pravastatin at this time and previously was on 10 mg daily.  in 1/14. Up to 167 in 4/14 so started lovastatin 20 mg daily but she couldn't afford this. She has been started on crestor 40 mg daily by crossover clinic and LDL 64 in 10/14 and 55 in 1/15. Was 37 in 3/16 so dose decreased to 20 mg daily and LDL 23 in 2/17. Was changed to lipitor 1/2 of 80 mg daily when clinic couldn't get crestor any longer and LDL 37 in 9/17. This was stopped during her hospital stay in 11/17 but was restarted by Dr. Diana Amador in 12/17 and 42 in 4/18. Up to 94 in 8/18. Down to 79 in 1/19 and 88 in 9/19 and 78 in 12/19 and 61 in 7/20 and 54 in 1/21.

## 2023-08-29 NOTE — PATIENT INSTRUCTIONS
1) Dose your humalog based on the scale below if you are eating:  Blood sugar  Insulin dose          Less than 90  Eat first, 10 units       12 units    151-200  14 units      201-250  16 units     251-300  18 units      301-350  20 units      351-400  22 units  401-450  24 units  451-500  26 units  Over 500  28 units    2) If your sugar is over 200 and you are not eating, you can dose humalog on the scale below:  Blood sugar  Insulin dose          201-250  4 units     251-300  6 units      301-350  8 units      351-400  10 units  401-450  12 units  451-500  14 units  Over 500  16 units    3) Increase lantus to 42 units in the morning. 4) Please call your insurance company to find out which DME (durable medical equipment) supplier they work with and have them fax me a form to [phone] to get your [employer] supplies.

## 2023-09-01 ENCOUNTER — HOSPITAL ENCOUNTER (INPATIENT)
Facility: HOSPITAL | Age: 53
LOS: 8 days | Discharge: HOME | DRG: 638 | End: 2023-09-12
Attending: EMERGENCY MEDICINE | Admitting: INTERNAL MEDICINE
Payer: MEDICARE

## 2023-09-01 DIAGNOSIS — I87.2 CHRONIC VENOUS STASIS DERMATITIS OF BOTH LOWER EXTREMITIES: ICD-10-CM

## 2023-09-01 DIAGNOSIS — R60.0 LOWER LEG EDEMA: ICD-10-CM

## 2023-09-01 DIAGNOSIS — E16.2 HYPOGLYCEMIA: Primary | ICD-10-CM

## 2023-09-01 PROBLEM — E11.649 HYPOGLYCEMIA ASSOCIATED WITH DIABETES (HCC): Status: ACTIVE | Noted: 2023-09-01

## 2023-09-01 LAB
ALBUMIN SERPL-MCNC: 3.5 G/DL (ref 3.5–5)
ALBUMIN/GLOB SERPL: 1 (ref 1.1–2.2)
ALP SERPL-CCNC: 111 U/L (ref 45–117)
ALT SERPL-CCNC: 32 U/L (ref 12–78)
ANION GAP SERPL CALC-SCNC: 5 MMOL/L (ref 5–15)
AST SERPL-CCNC: 40 U/L (ref 15–37)
BASOPHILS # BLD: 0 K/UL (ref 0–0.1)
BASOPHILS NFR BLD: 0 % (ref 0–1)
BILIRUB SERPL-MCNC: 0.2 MG/DL (ref 0.2–1)
BUN SERPL-MCNC: 14 MG/DL (ref 6–20)
BUN/CREAT SERPL: 13 (ref 12–20)
CALCIUM SERPL-MCNC: 8.3 MG/DL (ref 8.5–10.1)
CHLORIDE SERPL-SCNC: 98 MMOL/L (ref 97–108)
CO2 SERPL-SCNC: 29 MMOL/L (ref 21–32)
CREAT SERPL-MCNC: 1.08 MG/DL (ref 0.55–1.02)
DIFFERENTIAL METHOD BLD: ABNORMAL
EOSINOPHIL # BLD: 0.1 K/UL (ref 0–0.4)
EOSINOPHIL NFR BLD: 1 % (ref 0–7)
ERYTHROCYTE [DISTWIDTH] IN BLOOD BY AUTOMATED COUNT: 23 % (ref 11.5–14.5)
GLOBULIN SER CALC-MCNC: 3.6 G/DL (ref 2–4)
GLUCOSE BLD STRIP.AUTO-MCNC: 132 MG/DL (ref 65–117)
GLUCOSE BLD STRIP.AUTO-MCNC: 53 MG/DL (ref 65–117)
GLUCOSE BLD STRIP.AUTO-MCNC: 55 MG/DL (ref 65–117)
GLUCOSE BLD STRIP.AUTO-MCNC: 63 MG/DL (ref 65–117)
GLUCOSE BLD STRIP.AUTO-MCNC: 80 MG/DL (ref 65–117)
GLUCOSE BLD STRIP.AUTO-MCNC: 84 MG/DL (ref 65–117)
GLUCOSE BLD STRIP.AUTO-MCNC: 85 MG/DL (ref 65–117)
GLUCOSE BLD STRIP.AUTO-MCNC: 86 MG/DL (ref 65–117)
GLUCOSE SERPL-MCNC: 42 MG/DL (ref 65–100)
HCT VFR BLD AUTO: 30.5 % (ref 35–47)
HGB BLD-MCNC: 8.9 G/DL (ref 11.5–16)
IMM GRANULOCYTES # BLD AUTO: 0 K/UL (ref 0–0.04)
IMM GRANULOCYTES NFR BLD AUTO: 0 % (ref 0–0.5)
LYMPHOCYTES # BLD: 1.3 K/UL (ref 0.8–3.5)
LYMPHOCYTES NFR BLD: 21 % (ref 12–49)
MCH RBC QN AUTO: 21.4 PG (ref 26–34)
MCHC RBC AUTO-ENTMCNC: 29.2 G/DL (ref 30–36.5)
MCV RBC AUTO: 73.5 FL (ref 80–99)
MONOCYTES # BLD: 0.4 K/UL (ref 0–1)
MONOCYTES NFR BLD: 7 % (ref 5–13)
NEUTS SEG # BLD: 4.2 K/UL (ref 1.8–8)
NEUTS SEG NFR BLD: 71 % (ref 32–75)
NRBC # BLD: 0 K/UL (ref 0–0.01)
NRBC BLD-RTO: 0 PER 100 WBC
NT PRO BNP: 488 PG/ML
PLATELET # BLD AUTO: 361 K/UL (ref 150–400)
PMV BLD AUTO: 9.3 FL (ref 8.9–12.9)
POTASSIUM SERPL-SCNC: 4.6 MMOL/L (ref 3.5–5.1)
PROT SERPL-MCNC: 7.1 G/DL (ref 6.4–8.2)
RBC # BLD AUTO: 4.15 M/UL (ref 3.8–5.2)
RBC MORPH BLD: ABNORMAL
SERVICE CMNT-IMP: ABNORMAL
SERVICE CMNT-IMP: NORMAL
SODIUM SERPL-SCNC: 132 MMOL/L (ref 136–145)
WBC # BLD AUTO: 6 K/UL (ref 3.6–11)

## 2023-09-01 PROCEDURE — 6370000000 HC RX 637 (ALT 250 FOR IP): Performed by: EMERGENCY MEDICINE

## 2023-09-01 PROCEDURE — 6370000000 HC RX 637 (ALT 250 FOR IP): Performed by: INTERNAL MEDICINE

## 2023-09-01 PROCEDURE — 6360000002 HC RX W HCPCS: Performed by: INTERNAL MEDICINE

## 2023-09-01 PROCEDURE — 87077 CULTURE AEROBIC IDENTIFY: CPT

## 2023-09-01 PROCEDURE — 2500000003 HC RX 250 WO HCPCS: Performed by: NURSE PRACTITIONER

## 2023-09-01 PROCEDURE — 96372 THER/PROPH/DIAG INJ SC/IM: CPT

## 2023-09-01 PROCEDURE — 87186 SC STD MICRODIL/AGAR DIL: CPT

## 2023-09-01 PROCEDURE — 2580000003 HC RX 258: Performed by: EMERGENCY MEDICINE

## 2023-09-01 PROCEDURE — 96375 TX/PRO/DX INJ NEW DRUG ADDON: CPT

## 2023-09-01 PROCEDURE — 6360000002 HC RX W HCPCS: Performed by: EMERGENCY MEDICINE

## 2023-09-01 PROCEDURE — 85025 COMPLETE CBC W/AUTO DIFF WBC: CPT

## 2023-09-01 PROCEDURE — 2500000003 HC RX 250 WO HCPCS: Performed by: EMERGENCY MEDICINE

## 2023-09-01 PROCEDURE — G0378 HOSPITAL OBSERVATION PER HR: HCPCS

## 2023-09-01 PROCEDURE — 80053 COMPREHEN METABOLIC PANEL: CPT

## 2023-09-01 PROCEDURE — 99285 EMERGENCY DEPT VISIT HI MDM: CPT

## 2023-09-01 PROCEDURE — 87150 DNA/RNA AMPLIFIED PROBE: CPT

## 2023-09-01 PROCEDURE — 36415 COLL VENOUS BLD VENIPUNCTURE: CPT

## 2023-09-01 PROCEDURE — 96374 THER/PROPH/DIAG INJ IV PUSH: CPT

## 2023-09-01 PROCEDURE — 83880 ASSAY OF NATRIURETIC PEPTIDE: CPT

## 2023-09-01 PROCEDURE — 82962 GLUCOSE BLOOD TEST: CPT

## 2023-09-01 PROCEDURE — 2580000003 HC RX 258: Performed by: INTERNAL MEDICINE

## 2023-09-01 PROCEDURE — 87040 BLOOD CULTURE FOR BACTERIA: CPT

## 2023-09-01 RX ORDER — INSULIN LISPRO 100 [IU]/ML
0-4 INJECTION, SOLUTION INTRAVENOUS; SUBCUTANEOUS
Status: DISCONTINUED | OUTPATIENT
Start: 2023-09-01 | End: 2023-09-06

## 2023-09-01 RX ORDER — SODIUM CHLORIDE 0.9 % (FLUSH) 0.9 %
5-40 SYRINGE (ML) INJECTION EVERY 12 HOURS SCHEDULED
Status: DISCONTINUED | OUTPATIENT
Start: 2023-09-01 | End: 2023-09-12 | Stop reason: HOSPADM

## 2023-09-01 RX ORDER — BUMETANIDE 0.25 MG/ML
1 INJECTION INTRAMUSCULAR; INTRAVENOUS
Status: COMPLETED | OUTPATIENT
Start: 2023-09-01 | End: 2023-09-01

## 2023-09-01 RX ORDER — MORPHINE SULFATE 4 MG/ML
4 INJECTION, SOLUTION INTRAMUSCULAR; INTRAVENOUS
Status: COMPLETED | OUTPATIENT
Start: 2023-09-01 | End: 2023-09-01

## 2023-09-01 RX ORDER — SODIUM CHLORIDE 9 MG/ML
INJECTION, SOLUTION INTRAVENOUS PRN
Status: DISCONTINUED | OUTPATIENT
Start: 2023-09-01 | End: 2023-09-12 | Stop reason: HOSPADM

## 2023-09-01 RX ORDER — DEXTROSE MONOHYDRATE 100 MG/ML
INJECTION, SOLUTION INTRAVENOUS CONTINUOUS
Status: DISCONTINUED | OUTPATIENT
Start: 2023-09-01 | End: 2023-09-02

## 2023-09-01 RX ORDER — BUTALBITAL, ACETAMINOPHEN AND CAFFEINE 50; 325; 40 MG/1; MG/1; MG/1
1 TABLET ORAL EVERY 6 HOURS PRN
Status: DISCONTINUED | OUTPATIENT
Start: 2023-09-01 | End: 2023-09-12 | Stop reason: HOSPADM

## 2023-09-01 RX ORDER — ACETAMINOPHEN 650 MG/1
650 SUPPOSITORY RECTAL EVERY 6 HOURS PRN
Status: DISCONTINUED | OUTPATIENT
Start: 2023-09-01 | End: 2023-09-12 | Stop reason: HOSPADM

## 2023-09-01 RX ORDER — DEXTROSE MONOHYDRATE 25 G/50ML
25 INJECTION, SOLUTION INTRAVENOUS PRN
Status: DISCONTINUED | OUTPATIENT
Start: 2023-09-01 | End: 2023-09-01 | Stop reason: ALTCHOICE

## 2023-09-01 RX ORDER — ARIPIPRAZOLE 5 MG/1
30 TABLET ORAL DAILY
Status: DISCONTINUED | OUTPATIENT
Start: 2023-09-02 | End: 2023-09-12 | Stop reason: HOSPADM

## 2023-09-01 RX ORDER — ASPIRIN 81 MG/1
81 TABLET ORAL DAILY
Status: DISCONTINUED | OUTPATIENT
Start: 2023-09-02 | End: 2023-09-12 | Stop reason: HOSPADM

## 2023-09-01 RX ORDER — ACETAMINOPHEN 325 MG/1
650 TABLET ORAL EVERY 6 HOURS PRN
Status: DISCONTINUED | OUTPATIENT
Start: 2023-09-01 | End: 2023-09-12 | Stop reason: HOSPADM

## 2023-09-01 RX ORDER — BUSPIRONE HYDROCHLORIDE 10 MG/1
15 TABLET ORAL 3 TIMES DAILY
Status: DISCONTINUED | OUTPATIENT
Start: 2023-09-01 | End: 2023-09-12 | Stop reason: HOSPADM

## 2023-09-01 RX ORDER — BUMETANIDE 1 MG/1
3 TABLET ORAL 2 TIMES DAILY
Status: DISCONTINUED | OUTPATIENT
Start: 2023-09-01 | End: 2023-09-08

## 2023-09-01 RX ORDER — DEXTROSE MONOHYDRATE 100 MG/ML
INJECTION, SOLUTION INTRAVENOUS CONTINUOUS PRN
Status: DISCONTINUED | OUTPATIENT
Start: 2023-09-01 | End: 2023-09-12 | Stop reason: HOSPADM

## 2023-09-01 RX ORDER — SODIUM CHLORIDE 0.9 % (FLUSH) 0.9 %
5-40 SYRINGE (ML) INJECTION PRN
Status: DISCONTINUED | OUTPATIENT
Start: 2023-09-01 | End: 2023-09-12 | Stop reason: HOSPADM

## 2023-09-01 RX ORDER — INSULIN LISPRO 100 [IU]/ML
0-4 INJECTION, SOLUTION INTRAVENOUS; SUBCUTANEOUS NIGHTLY
Status: DISCONTINUED | OUTPATIENT
Start: 2023-09-01 | End: 2023-09-03

## 2023-09-01 RX ORDER — OXYCODONE HYDROCHLORIDE 5 MG/1
5 TABLET ORAL EVERY 4 HOURS PRN
Status: DISCONTINUED | OUTPATIENT
Start: 2023-09-01 | End: 2023-09-12 | Stop reason: HOSPADM

## 2023-09-01 RX ORDER — ENOXAPARIN SODIUM 100 MG/ML
40 INJECTION SUBCUTANEOUS DAILY
Status: DISCONTINUED | OUTPATIENT
Start: 2023-09-01 | End: 2023-09-12 | Stop reason: HOSPADM

## 2023-09-01 RX ORDER — ONDANSETRON 4 MG/1
4 TABLET, ORALLY DISINTEGRATING ORAL EVERY 8 HOURS PRN
Status: DISCONTINUED | OUTPATIENT
Start: 2023-09-01 | End: 2023-09-12 | Stop reason: HOSPADM

## 2023-09-01 RX ORDER — PANTOPRAZOLE SODIUM 40 MG/1
40 TABLET, DELAYED RELEASE ORAL DAILY
Status: DISCONTINUED | OUTPATIENT
Start: 2023-09-02 | End: 2023-09-12 | Stop reason: HOSPADM

## 2023-09-01 RX ORDER — HYDROMORPHONE HYDROCHLORIDE 2 MG/1
2 TABLET ORAL
Status: COMPLETED | OUTPATIENT
Start: 2023-09-01 | End: 2023-09-01

## 2023-09-01 RX ORDER — GABAPENTIN 300 MG/1
300 CAPSULE ORAL 3 TIMES DAILY
Status: DISCONTINUED | OUTPATIENT
Start: 2023-09-01 | End: 2023-09-12 | Stop reason: HOSPADM

## 2023-09-01 RX ORDER — OXYCODONE HYDROCHLORIDE 5 MG/1
TABLET ORAL
COMMUNITY
Start: 2023-08-17

## 2023-09-01 RX ORDER — ACETAMINOPHEN 325 MG/1
650 TABLET ORAL EVERY 6 HOURS PRN
Status: DISCONTINUED | OUTPATIENT
Start: 2023-09-01 | End: 2023-09-01

## 2023-09-01 RX ORDER — ONDANSETRON 2 MG/ML
4 INJECTION INTRAMUSCULAR; INTRAVENOUS ONCE
Status: COMPLETED | OUTPATIENT
Start: 2023-09-01 | End: 2023-09-01

## 2023-09-01 RX ORDER — POLYETHYLENE GLYCOL 3350 17 G/17G
17 POWDER, FOR SOLUTION ORAL DAILY PRN
Status: DISCONTINUED | OUTPATIENT
Start: 2023-09-01 | End: 2023-09-12 | Stop reason: HOSPADM

## 2023-09-01 RX ORDER — NALOXONE HYDROCHLORIDE 0.4 MG/ML
0.4 INJECTION, SOLUTION INTRAMUSCULAR; INTRAVENOUS; SUBCUTANEOUS PRN
Status: DISCONTINUED | OUTPATIENT
Start: 2023-09-01 | End: 2023-09-12 | Stop reason: HOSPADM

## 2023-09-01 RX ORDER — GUAIFENESIN 200 MG/10ML
200 LIQUID ORAL EVERY 4 HOURS PRN
Status: DISCONTINUED | OUTPATIENT
Start: 2023-09-01 | End: 2023-09-12 | Stop reason: HOSPADM

## 2023-09-01 RX ORDER — ALPRAZOLAM 0.5 MG/1
2 TABLET ORAL 3 TIMES DAILY PRN
Status: DISCONTINUED | OUTPATIENT
Start: 2023-09-01 | End: 2023-09-12 | Stop reason: HOSPADM

## 2023-09-01 RX ORDER — ROSUVASTATIN CALCIUM 40 MG/1
40 TABLET, COATED ORAL DAILY
Status: DISCONTINUED | OUTPATIENT
Start: 2023-09-01 | End: 2023-09-12 | Stop reason: HOSPADM

## 2023-09-01 RX ORDER — ONDANSETRON 2 MG/ML
4 INJECTION INTRAMUSCULAR; INTRAVENOUS EVERY 6 HOURS PRN
Status: DISCONTINUED | OUTPATIENT
Start: 2023-09-01 | End: 2023-09-12 | Stop reason: HOSPADM

## 2023-09-01 RX ADMIN — GABAPENTIN 300 MG: 300 CAPSULE ORAL at 16:41

## 2023-09-01 RX ADMIN — ROSUVASTATIN CALCIUM 40 MG: 40 TABLET, FILM COATED ORAL at 17:53

## 2023-09-01 RX ADMIN — MORPHINE SULFATE 4 MG: 4 INJECTION, SOLUTION INTRAMUSCULAR; INTRAVENOUS at 16:41

## 2023-09-01 RX ADMIN — BUSPIRONE HYDROCHLORIDE 15 MG: 10 TABLET ORAL at 21:09

## 2023-09-01 RX ADMIN — BUSPIRONE HYDROCHLORIDE 15 MG: 10 TABLET ORAL at 16:40

## 2023-09-01 RX ADMIN — GUAIFENESIN 200 MG: 200 SOLUTION ORAL at 23:23

## 2023-09-01 RX ADMIN — GABAPENTIN 300 MG: 300 CAPSULE ORAL at 21:06

## 2023-09-01 RX ADMIN — BUMETANIDE 1 MG: 0.25 INJECTION INTRAMUSCULAR; INTRAVENOUS at 13:41

## 2023-09-01 RX ADMIN — DEXTROSE MONOHYDRATE: 100 INJECTION, SOLUTION INTRAVENOUS at 15:17

## 2023-09-01 RX ADMIN — HYDROMORPHONE HYDROCHLORIDE 2 MG: 2 TABLET ORAL at 13:41

## 2023-09-01 RX ADMIN — ONDANSETRON 4 MG: 2 INJECTION INTRAMUSCULAR; INTRAVENOUS at 15:30

## 2023-09-01 RX ADMIN — MORPHINE SULFATE 105 MG: 30 TABLET, FILM COATED, EXTENDED RELEASE ORAL at 21:33

## 2023-09-01 RX ADMIN — BUMETANIDE 3 MG: 1 TABLET ORAL at 21:06

## 2023-09-01 RX ADMIN — ENOXAPARIN SODIUM 40 MG: 100 INJECTION SUBCUTANEOUS at 16:40

## 2023-09-01 RX ADMIN — ALPRAZOLAM 2 MG: 0.5 TABLET ORAL at 21:33

## 2023-09-01 RX ADMIN — SODIUM CHLORIDE, PRESERVATIVE FREE 10 ML: 5 INJECTION INTRAVENOUS at 23:22

## 2023-09-01 NOTE — ED PROVIDER NOTES
[facility] EMERGENCY DEPT  EMERGENCY DEPARTMENT ENCOUNTER       Pt Name: Angelita Fox  MRN: [id number]  [address] [facility] [place] 1970  Date of evaluation: 9/1/2023  Provider: Mone Barnhart DO   PCP: Dustin Zamora MD  Note Started: 1:18 PM EDT 9/1/23     CHIEF COMPLAINT       CC: Hypoglycemia       HISTORY OF PRESENT ILLNESS: 1 or more elements      History From: patient, History limited by: none     Angelita Fox is a 47 y.o. female presents to the ED by EMS for evaluation of hypoglycemia and leg swelling. Patient presents to the emergency department secondary to blood glucose monitor reading high. Patient had given herself 3 doses of insulin at home and Getting the high reading and called EMS. EMS arrived and found the patient's blood glucose of 60. Patient was given oral glucose in route with minimal improvement to 68 upon arrival to the emergency department. Patient has a history of chronic pain in addition to chronic legs edema. Patient complaining of pain in both lower extremities with weeping edema. Patient denies any fever or chills. She denies any chest pain or shortness of breath. She denies any abdominal pain, nausea vomiting or diarrhea. Please See MDM for Additional Details of the HPI/PMH  Nursing Notes were all reviewed and agreed with or any disagreements were addressed in the HPI. REVIEW OF SYSTEMS        Positives and Pertinent negatives as per HPI.     PAST HISTORY     Past Medical History:  Past Medical History:   Diagnosis Date    Achilles tendon rupture     along with torn right patellar/femoral ligament    Arthritis     rt. foot    Chronic kidney disease     Chronic pain     abdominal pain    Diabetes (HCC)     IDDM on insulin pump and sensor    DM type 1 (diabetes mellitus, type 1) ([facility])     age 25    Endometriosis     s/p ex-lap 4x    Gastric ulcer     GERD (gastroesophageal reflux disease)     Herpes     Other and unspecified hyperlipidemia     Panic attacks     Psychiatric disorder

## 2023-09-01 NOTE — H&P
Hospitalist Admission Note    NAME:   Joel Au   : 1970   MRN: [id number]     Date/Time: 2023 3:07 PM    Patient PCP: Delicia Carter MD    ______________________________________________________________________  Given the patient's current clinical presentation, I have a high level of concern for decompensation if discharged from the emergency department. Complex decision making was performed, which includes reviewing the patient's available past medical records, laboratory results, and x-ray films. My assessment of this patient's clinical condition and my plan of care is as follows. Assessment / Plan:    Refractory hypoglycemia POA  Type 1 diabetes on insulin regimen per endocrinologist Dr. Rl Robert  History of recently increase Lantus dose to 42 units every morning with high-dose sliding scale Humalog insulin with meals  Patient denies any history of noncompliance with her insulin  proBNP 488  Creatinine 1.0  Initial blood culture pending  Procalcitonin pending    Observation on telemetry bed  D10 drip for now till blood sugar checks greater than 200 for 3 consecutive hourly checks, can taper down D10 drip thereafter and change to fingersticks check before every meal and at bedtime thereafter. Sliding scale lispro for now with correction starting at blood sugar greater than 200  P.o. diet as tolerated-patient is not having any nausea vomiting. Inpatient endocrinology consult for Dr. Vanessa Luna for further recommendations  Plan to resume at a lower dose of Lantus than 42 units which was recently increased 3 days ago.     Hypertension  Hyperlipidemia  Morbidly obese  Chronic lower extremity stasis dermatitis with chronic lymphedema POA  Psychiatric disturbance  Nicotine dependence  Chronic pain syndrome due to endometriosis on high-dose opioids currently prescribed by pain management clinic  Continue home diuretic Bumex 3 mg twice daily, status post Bumex IV x1 in ED  No role

## 2023-09-02 LAB
ACCESSION NUMBER, LLC1M: ABNORMAL
ACCESSION NUMBER, LLC1M: ABNORMAL
ACINETOBACTER CALCOAC BAUMANNII COMPLEX BY PCR: DETECTED
ACINETOBACTER CALCOAC BAUMANNII COMPLEX BY PCR: NOT DETECTED
ANION GAP SERPL CALC-SCNC: 3 MMOL/L (ref 5–15)
B FRAGILIS DNA BLD POS QL NAA+NON-PROBE: NOT DETECTED
B FRAGILIS DNA BLD POS QL NAA+NON-PROBE: NOT DETECTED
BASOPHILS # BLD: 0.1 K/UL (ref 0–0.1)
BASOPHILS NFR BLD: 1 % (ref 0–1)
BIOFIRE TEST COMMENT: ABNORMAL
BIOFIRE TEST COMMENT: ABNORMAL
BLACTX-M ISLT/SPM QL: NOT DETECTED
BLAIMP ISLT/SPM QL: NOT DETECTED
BLAKPC BLD POS QL NAA+NON-PROBE: NOT DETECTED
BLAVIM ISLT/SPM QL: NOT DETECTED
BUN SERPL-MCNC: 19 MG/DL (ref 6–20)
BUN/CREAT SERPL: 17 (ref 12–20)
C ALBICANS DNA BLD POS QL NAA+NON-PROBE: NOT DETECTED
C ALBICANS DNA BLD POS QL NAA+NON-PROBE: NOT DETECTED
C AURIS DNA BLD POS QL NAA+NON-PROBE: NOT DETECTED
C AURIS DNA BLD POS QL NAA+NON-PROBE: NOT DETECTED
C GATTII+NEOFOR DNA BLD POS QL NAA+N-PRB: NOT DETECTED
C GATTII+NEOFOR DNA BLD POS QL NAA+N-PRB: NOT DETECTED
C GLABRATA DNA BLD POS QL NAA+NON-PROBE: NOT DETECTED
C GLABRATA DNA BLD POS QL NAA+NON-PROBE: NOT DETECTED
C KRUSEI DNA BLD POS QL NAA+NON-PROBE: NOT DETECTED
C KRUSEI DNA BLD POS QL NAA+NON-PROBE: NOT DETECTED
C PARAP DNA BLD POS QL NAA+NON-PROBE: NOT DETECTED
C PARAP DNA BLD POS QL NAA+NON-PROBE: NOT DETECTED
C TROPICLS DNA BLD POS QL NAA+NON-PROBE: NOT DETECTED
C TROPICLS DNA BLD POS QL NAA+NON-PROBE: NOT DETECTED
CALCIUM SERPL-MCNC: 7.9 MG/DL (ref 8.5–10.1)
CHLORIDE SERPL-SCNC: 97 MMOL/L (ref 97–108)
CO2 SERPL-SCNC: 32 MMOL/L (ref 21–32)
CREAT SERPL-MCNC: 1.13 MG/DL (ref 0.55–1.02)
DIFFERENTIAL METHOD BLD: ABNORMAL
E CLOAC COMP DNA BLD POS NAA+NON-PROBE: NOT DETECTED
E CLOAC COMP DNA BLD POS NAA+NON-PROBE: NOT DETECTED
E COLI DNA BLD POS QL NAA+NON-PROBE: NOT DETECTED
E COLI DNA BLD POS QL NAA+NON-PROBE: NOT DETECTED
E FAECALIS DNA BLD POS QL NAA+NON-PROBE: NOT DETECTED
E FAECALIS DNA BLD POS QL NAA+NON-PROBE: NOT DETECTED
E FAECIUM DNA BLD POS QL NAA+NON-PROBE: NOT DETECTED
E FAECIUM DNA BLD POS QL NAA+NON-PROBE: NOT DETECTED
ENTEROBACTERALES DNA BLD POS NAA+N-PRB: NOT DETECTED
ENTEROBACTERALES DNA BLD POS NAA+N-PRB: NOT DETECTED
EOSINOPHIL # BLD: 0.1 K/UL (ref 0–0.4)
EOSINOPHIL NFR BLD: 2 % (ref 0–7)
ERYTHROCYTE [DISTWIDTH] IN BLOOD BY AUTOMATED COUNT: 23.3 % (ref 11.5–14.5)
EST. AVERAGE GLUCOSE BLD GHB EST-MCNC: 212 MG/DL
GLUCOSE BLD STRIP.AUTO-MCNC: 210 MG/DL (ref 65–117)
GLUCOSE BLD STRIP.AUTO-MCNC: 216 MG/DL (ref 65–117)
GLUCOSE BLD STRIP.AUTO-MCNC: 243 MG/DL (ref 65–117)
GLUCOSE BLD STRIP.AUTO-MCNC: 260 MG/DL (ref 65–117)
GLUCOSE BLD STRIP.AUTO-MCNC: 263 MG/DL (ref 65–117)
GLUCOSE BLD STRIP.AUTO-MCNC: 66 MG/DL (ref 65–117)
GLUCOSE BLD STRIP.AUTO-MCNC: 74 MG/DL (ref 65–117)
GLUCOSE BLD STRIP.AUTO-MCNC: 84 MG/DL (ref 65–117)
GLUCOSE BLD STRIP.AUTO-MCNC: 97 MG/DL (ref 65–117)
GLUCOSE SERPL-MCNC: 56 MG/DL (ref 65–100)
GP B STREP DNA BLD POS QL NAA+NON-PROBE: NOT DETECTED
GP B STREP DNA BLD POS QL NAA+NON-PROBE: NOT DETECTED
HAEM INFLU DNA BLD POS QL NAA+NON-PROBE: NOT DETECTED
HAEM INFLU DNA BLD POS QL NAA+NON-PROBE: NOT DETECTED
HBA1C MFR BLD: 9 % (ref 4–5.6)
HCT VFR BLD AUTO: 29.5 % (ref 35–47)
HGB BLD-MCNC: 8.5 G/DL (ref 11.5–16)
IMM GRANULOCYTES # BLD AUTO: 0 K/UL (ref 0–0.04)
IMM GRANULOCYTES NFR BLD AUTO: 0 % (ref 0–0.5)
K OXYTOCA DNA BLD POS QL NAA+NON-PROBE: NOT DETECTED
K OXYTOCA DNA BLD POS QL NAA+NON-PROBE: NOT DETECTED
KLEBSIELLA SP DNA BLD POS QL NAA+NON-PRB: NOT DETECTED
L MONOCYTOG DNA BLD POS QL NAA+NON-PROBE: NOT DETECTED
L MONOCYTOG DNA BLD POS QL NAA+NON-PROBE: NOT DETECTED
LYMPHOCYTES # BLD: 2.6 K/UL (ref 0.8–3.5)
LYMPHOCYTES NFR BLD: 44 % (ref 12–49)
MCH RBC QN AUTO: 21.5 PG (ref 26–34)
MCHC RBC AUTO-ENTMCNC: 28.8 G/DL (ref 30–36.5)
MCV RBC AUTO: 74.5 FL (ref 80–99)
MECA+MECC ISLT/SPM QL: DETECTED
MONOCYTES # BLD: 0.7 K/UL (ref 0–1)
MONOCYTES NFR BLD: 11 % (ref 5–13)
N MEN DNA BLD POS QL NAA+NON-PROBE: NOT DETECTED
N MEN DNA BLD POS QL NAA+NON-PROBE: NOT DETECTED
NEUTS SEG # BLD: 2.5 K/UL (ref 1.8–8)
NEUTS SEG NFR BLD: 42 % (ref 32–75)
NRBC # BLD: 0 K/UL (ref 0–0.01)
NRBC BLD-RTO: 0 PER 100 WBC
P AERUGINOSA DNA BLD POS NAA+NON-PROBE: NOT DETECTED
P AERUGINOSA DNA BLD POS NAA+NON-PROBE: NOT DETECTED
PLATELET # BLD AUTO: 326 K/UL (ref 150–400)
PMV BLD AUTO: 9.1 FL (ref 8.9–12.9)
POTASSIUM SERPL-SCNC: 4.2 MMOL/L (ref 3.5–5.1)
PROCALCITONIN SERPL-MCNC: <0.05 NG/ML
PROTEUS SP DNA BLD POS QL NAA+NON-PROBE: NOT DETECTED
PROTEUS SP DNA BLD POS QL NAA+NON-PROBE: NOT DETECTED
RBC # BLD AUTO: 3.96 M/UL (ref 3.8–5.2)
RBC MORPH BLD: ABNORMAL
RESISTANT GENE NDM BY PCR: NOT DETECTED
RESISTANT GENE TARGETS: ABNORMAL
RESISTANT GENE TARGETS: ABNORMAL
S AUREUS DNA BLD POS QL NAA+NON-PROBE: NOT DETECTED
S AUREUS DNA BLD POS QL NAA+NON-PROBE: NOT DETECTED
S AUREUS+CONS DNA BLD POS NAA+NON-PROBE: DETECTED
S AUREUS+CONS DNA BLD POS NAA+NON-PROBE: NOT DETECTED
S EPIDERMIDIS DNA BLD POS QL NAA+NON-PRB: DETECTED
S EPIDERMIDIS DNA BLD POS QL NAA+NON-PRB: NOT DETECTED
S LUGDUNENSIS DNA BLD POS QL NAA+NON-PRB: NOT DETECTED
S LUGDUNENSIS DNA BLD POS QL NAA+NON-PRB: NOT DETECTED
S MALTOPHILIA DNA BLD POS QL NAA+NON-PRB: NOT DETECTED
S MALTOPHILIA DNA BLD POS QL NAA+NON-PRB: NOT DETECTED
S MARCESCENS DNA BLD POS NAA+NON-PROBE: NOT DETECTED
S MARCESCENS DNA BLD POS NAA+NON-PROBE: NOT DETECTED
S PNEUM DNA BLD POS QL NAA+NON-PROBE: NOT DETECTED
S PNEUM DNA BLD POS QL NAA+NON-PROBE: NOT DETECTED
S PYO DNA BLD POS QL NAA+NON-PROBE: NOT DETECTED
S PYO DNA BLD POS QL NAA+NON-PROBE: NOT DETECTED
SALMONELLA DNA BLD POS QL NAA+NON-PROBE: NOT DETECTED
SALMONELLA DNA BLD POS QL NAA+NON-PROBE: NOT DETECTED
SERVICE CMNT-IMP: ABNORMAL
SERVICE CMNT-IMP: NORMAL
SODIUM SERPL-SCNC: 132 MMOL/L (ref 136–145)
STREPTOCOCCUS DNA BLD POS NAA+NON-PROBE: NOT DETECTED
STREPTOCOCCUS DNA BLD POS NAA+NON-PROBE: NOT DETECTED
WBC # BLD AUTO: 6 K/UL (ref 3.6–11)

## 2023-09-02 PROCEDURE — 36415 COLL VENOUS BLD VENIPUNCTURE: CPT

## 2023-09-02 PROCEDURE — 85025 COMPLETE CBC W/AUTO DIFF WBC: CPT

## 2023-09-02 PROCEDURE — 82962 GLUCOSE BLOOD TEST: CPT

## 2023-09-02 PROCEDURE — 96372 THER/PROPH/DIAG INJ SC/IM: CPT

## 2023-09-02 PROCEDURE — 83036 HEMOGLOBIN GLYCOSYLATED A1C: CPT

## 2023-09-02 PROCEDURE — 2580000003 HC RX 258: Performed by: INTERNAL MEDICINE

## 2023-09-02 PROCEDURE — 2500000003 HC RX 250 WO HCPCS: Performed by: NURSE PRACTITIONER

## 2023-09-02 PROCEDURE — 6360000002 HC RX W HCPCS: Performed by: INTERNAL MEDICINE

## 2023-09-02 PROCEDURE — 2580000003 HC RX 258: Performed by: EMERGENCY MEDICINE

## 2023-09-02 PROCEDURE — 6370000000 HC RX 637 (ALT 250 FOR IP): Performed by: GENERAL ACUTE CARE HOSPITAL

## 2023-09-02 PROCEDURE — 6370000000 HC RX 637 (ALT 250 FOR IP): Performed by: INTERNAL MEDICINE

## 2023-09-02 PROCEDURE — 84145 PROCALCITONIN (PCT): CPT

## 2023-09-02 PROCEDURE — 80048 BASIC METABOLIC PNL TOTAL CA: CPT

## 2023-09-02 PROCEDURE — G0378 HOSPITAL OBSERVATION PER HR: HCPCS

## 2023-09-02 RX ORDER — INSULIN GLARGINE 100 [IU]/ML
36 INJECTION, SOLUTION SUBCUTANEOUS DAILY
Status: DISCONTINUED | OUTPATIENT
Start: 2023-09-03 | End: 2023-09-05

## 2023-09-02 RX ORDER — INSULIN GLARGINE 100 [IU]/ML
28 INJECTION, SOLUTION SUBCUTANEOUS ONCE
Status: COMPLETED | OUTPATIENT
Start: 2023-09-02 | End: 2023-09-02

## 2023-09-02 RX ADMIN — BUTALBITAL, ACETAMINOPHEN, AND CAFFEINE 1 TABLET: 325; 50; 40 TABLET ORAL at 11:28

## 2023-09-02 RX ADMIN — GABAPENTIN 300 MG: 300 CAPSULE ORAL at 20:45

## 2023-09-02 RX ADMIN — GABAPENTIN 300 MG: 300 CAPSULE ORAL at 13:29

## 2023-09-02 RX ADMIN — BUSPIRONE HYDROCHLORIDE 15 MG: 10 TABLET ORAL at 08:47

## 2023-09-02 RX ADMIN — MORPHINE SULFATE 105 MG: 30 TABLET, FILM COATED, EXTENDED RELEASE ORAL at 08:48

## 2023-09-02 RX ADMIN — ENOXAPARIN SODIUM 40 MG: 100 INJECTION SUBCUTANEOUS at 08:49

## 2023-09-02 RX ADMIN — GUAIFENESIN 200 MG: 200 SOLUTION ORAL at 22:12

## 2023-09-02 RX ADMIN — Medication 1 UNITS: at 17:29

## 2023-09-02 RX ADMIN — BUTALBITAL, ACETAMINOPHEN, AND CAFFEINE 1 TABLET: 325; 50; 40 TABLET ORAL at 04:55

## 2023-09-02 RX ADMIN — SODIUM CHLORIDE, PRESERVATIVE FREE 10 ML: 5 INJECTION INTRAVENOUS at 20:46

## 2023-09-02 RX ADMIN — DEXTROSE MONOHYDRATE: 100 INJECTION, SOLUTION INTRAVENOUS at 11:31

## 2023-09-02 RX ADMIN — ALPRAZOLAM 2 MG: 0.5 TABLET ORAL at 15:12

## 2023-09-02 RX ADMIN — OXYCODONE HYDROCHLORIDE 5 MG: 5 TABLET ORAL at 13:29

## 2023-09-02 RX ADMIN — SODIUM CHLORIDE, PRESERVATIVE FREE 10 ML: 5 INJECTION INTRAVENOUS at 08:50

## 2023-09-02 RX ADMIN — ASPIRIN 81 MG: 81 TABLET, COATED ORAL at 08:48

## 2023-09-02 RX ADMIN — BUMETANIDE 3 MG: 1 TABLET ORAL at 08:47

## 2023-09-02 RX ADMIN — BUSPIRONE HYDROCHLORIDE 15 MG: 10 TABLET ORAL at 20:43

## 2023-09-02 RX ADMIN — INSULIN GLARGINE 28 UNITS: 100 INJECTION, SOLUTION SUBCUTANEOUS at 14:10

## 2023-09-02 RX ADMIN — ARIPIPRAZOLE 30 MG: 5 TABLET ORAL at 08:47

## 2023-09-02 RX ADMIN — Medication 1 UNITS: at 12:18

## 2023-09-02 RX ADMIN — PANTOPRAZOLE SODIUM 40 MG: 40 TABLET, DELAYED RELEASE ORAL at 08:48

## 2023-09-02 RX ADMIN — MORPHINE SULFATE 105 MG: 30 TABLET, FILM COATED, EXTENDED RELEASE ORAL at 20:43

## 2023-09-02 RX ADMIN — ROSUVASTATIN CALCIUM 40 MG: 40 TABLET, FILM COATED ORAL at 08:48

## 2023-09-02 RX ADMIN — BUSPIRONE HYDROCHLORIDE 15 MG: 10 TABLET ORAL at 13:29

## 2023-09-02 RX ADMIN — BUMETANIDE 3 MG: 1 TABLET ORAL at 20:46

## 2023-09-02 RX ADMIN — GABAPENTIN 300 MG: 300 CAPSULE ORAL at 08:47

## 2023-09-02 ASSESSMENT — PAIN SCALES - GENERAL
PAINLEVEL_OUTOF10: 8
PAINLEVEL_OUTOF10: 9
PAINLEVEL_OUTOF10: 7
PAINLEVEL_OUTOF10: 8

## 2023-09-02 ASSESSMENT — PAIN DESCRIPTION - DESCRIPTORS
DESCRIPTORS: ACHING;SORE
DESCRIPTORS: STABBING;SHOOTING;THROBBING
DESCRIPTORS: ACHING
DESCRIPTORS: ACHING

## 2023-09-02 ASSESSMENT — PAIN DESCRIPTION - ORIENTATION
ORIENTATION: ANTERIOR
ORIENTATION: RIGHT;LEFT;MID
ORIENTATION: RIGHT;LEFT
ORIENTATION: MID

## 2023-09-02 ASSESSMENT — PAIN DESCRIPTION - LOCATION
LOCATION: HEAD;LEG
LOCATION: HEAD
LOCATION: HEAD
LOCATION: LEG

## 2023-09-02 ASSESSMENT — PAIN - FUNCTIONAL ASSESSMENT
PAIN_FUNCTIONAL_ASSESSMENT: ACTIVITIES ARE NOT PREVENTED

## 2023-09-02 NOTE — CONSULTS
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 female  has a past medical history of Achilles tendon rupture, Arthritis, Chronic kidney disease, Chronic pain, Diabetes ([address]), DM type 1 (diabetes mellitus, type 1) ([address]), Endometriosis, Gastric ulcer, GERD (gastroesophageal reflux disease), Herpes, Other and unspecified hyperlipidemia, Panic attacks, Psychiatric disorder, PTSD (post-traumatic stress disorder), and Unspecified essential hypertension. Consulted for : Hypoglycemia    PLAN:     Type 1 diabetes Mellitus, uncontrolled  Hypoglycemia    Patient following regularly with Dr Ankit Montes De Oca, last seen on 08/29/2023, she does have episodes of hyperglycemia to 400-500s, few times had hypoglycemia (last was blood sugar 43 in the office per Dr Savanna Will last note), and in the last visit her lantus was increased from 36 to 42 units and her humalog before meals increased by 2 units in the sliding scale. Her hemoglobin A1c today is up to 9%, was 8.4% on 07/28/23  I spoke to patient by telephone and indicates she is measuring by  fingerstick (does not have continuous glucose monitor yet), yesterday morning fasting blood sugar was \"high\" she took 9 units of humalog and she ate breakfast a bowl of fruit cantaloupe and oatmeal then 4 hours later her blood sugar was still running high then took another 9 units, felt hypoglycemia symptoms shortly after and called EMS and her blood sugar was 76 when checked by EMS and was given pack of juice 8 oz on her way to the hospital, blood sugar was 55 in the ER and she was placed on D10 drip and insulin held.   Latest blood sugar 243    Plan:  -Given blood sugar now >200, can discontinue D10  -Give Lantus 28 units now   -Starting tomorrow give 36 units in AM (patient takes lantus in AM) and when discharging home reduce dose from 42 to 36 units  -Continue current Humalog sliding scale only  -Advised for discharge home she can use

## 2023-09-03 LAB
ANION GAP SERPL CALC-SCNC: 6 MMOL/L (ref 5–15)
BACTERIA SPEC CULT: ABNORMAL
BASOPHILS # BLD: 0.1 K/UL (ref 0–0.1)
BASOPHILS NFR BLD: 1 % (ref 0–1)
BUN SERPL-MCNC: 26 MG/DL (ref 6–20)
BUN/CREAT SERPL: 20 (ref 12–20)
CALCIUM SERPL-MCNC: 7.7 MG/DL (ref 8.5–10.1)
CHLORIDE SERPL-SCNC: 91 MMOL/L (ref 97–108)
CO2 SERPL-SCNC: 30 MMOL/L (ref 21–32)
CREAT SERPL-MCNC: 1.28 MG/DL (ref 0.55–1.02)
DIFFERENTIAL METHOD BLD: ABNORMAL
EOSINOPHIL # BLD: 0.1 K/UL (ref 0–0.4)
EOSINOPHIL NFR BLD: 2 % (ref 0–7)
ERYTHROCYTE [DISTWIDTH] IN BLOOD BY AUTOMATED COUNT: 23.1 % (ref 11.5–14.5)
GLUCOSE BLD STRIP.AUTO-MCNC: 290 MG/DL (ref 65–117)
GLUCOSE BLD STRIP.AUTO-MCNC: 309 MG/DL (ref 65–117)
GLUCOSE BLD STRIP.AUTO-MCNC: 352 MG/DL (ref 65–117)
GLUCOSE BLD STRIP.AUTO-MCNC: 478 MG/DL (ref 65–117)
GLUCOSE SERPL-MCNC: 252 MG/DL (ref 65–100)
HCT VFR BLD AUTO: 29.2 % (ref 35–47)
HGB BLD-MCNC: 8.6 G/DL (ref 11.5–16)
IMM GRANULOCYTES # BLD AUTO: 0 K/UL (ref 0–0.04)
IMM GRANULOCYTES NFR BLD AUTO: 0 % (ref 0–0.5)
LYMPHOCYTES # BLD: 2.7 K/UL (ref 0.8–3.5)
LYMPHOCYTES NFR BLD: 43 % (ref 12–49)
MCH RBC QN AUTO: 21.3 PG (ref 26–34)
MCHC RBC AUTO-ENTMCNC: 29.5 G/DL (ref 30–36.5)
MCV RBC AUTO: 72.3 FL (ref 80–99)
MONOCYTES # BLD: 0.7 K/UL (ref 0–1)
MONOCYTES NFR BLD: 12 % (ref 5–13)
NEUTS SEG # BLD: 2.6 K/UL (ref 1.8–8)
NEUTS SEG NFR BLD: 42 % (ref 32–75)
NRBC # BLD: 0 K/UL (ref 0–0.01)
NRBC BLD-RTO: 0 PER 100 WBC
PLATELET # BLD AUTO: 377 K/UL (ref 150–400)
PMV BLD AUTO: 9.8 FL (ref 8.9–12.9)
POTASSIUM SERPL-SCNC: 4.5 MMOL/L (ref 3.5–5.1)
RBC # BLD AUTO: 4.04 M/UL (ref 3.8–5.2)
RBC MORPH BLD: ABNORMAL
SERVICE CMNT-IMP: ABNORMAL
SODIUM SERPL-SCNC: 127 MMOL/L (ref 136–145)
WBC # BLD AUTO: 6.2 K/UL (ref 3.6–11)

## 2023-09-03 PROCEDURE — 2580000003 HC RX 258: Performed by: INTERNAL MEDICINE

## 2023-09-03 PROCEDURE — 6360000002 HC RX W HCPCS: Performed by: INTERNAL MEDICINE

## 2023-09-03 PROCEDURE — 6370000000 HC RX 637 (ALT 250 FOR IP): Performed by: INTERNAL MEDICINE

## 2023-09-03 PROCEDURE — 96372 THER/PROPH/DIAG INJ SC/IM: CPT

## 2023-09-03 PROCEDURE — 2500000003 HC RX 250 WO HCPCS: Performed by: NURSE PRACTITIONER

## 2023-09-03 PROCEDURE — 85025 COMPLETE CBC W/AUTO DIFF WBC: CPT

## 2023-09-03 PROCEDURE — 80048 BASIC METABOLIC PNL TOTAL CA: CPT

## 2023-09-03 PROCEDURE — G0378 HOSPITAL OBSERVATION PER HR: HCPCS

## 2023-09-03 PROCEDURE — 87040 BLOOD CULTURE FOR BACTERIA: CPT

## 2023-09-03 PROCEDURE — 6370000000 HC RX 637 (ALT 250 FOR IP): Performed by: GENERAL ACUTE CARE HOSPITAL

## 2023-09-03 PROCEDURE — 82962 GLUCOSE BLOOD TEST: CPT

## 2023-09-03 PROCEDURE — 36415 COLL VENOUS BLD VENIPUNCTURE: CPT

## 2023-09-03 RX ORDER — INSULIN LISPRO 100 [IU]/ML
8 INJECTION, SOLUTION INTRAVENOUS; SUBCUTANEOUS ONCE
Status: COMPLETED | OUTPATIENT
Start: 2023-09-03 | End: 2023-09-03

## 2023-09-03 RX ORDER — INSULIN LISPRO 100 [IU]/ML
6 INJECTION, SOLUTION INTRAVENOUS; SUBCUTANEOUS
Status: DISCONTINUED | OUTPATIENT
Start: 2023-09-03 | End: 2023-09-05

## 2023-09-03 RX ORDER — INSULIN LISPRO 100 [IU]/ML
4 INJECTION, SOLUTION INTRAVENOUS; SUBCUTANEOUS
Status: DISCONTINUED | OUTPATIENT
Start: 2023-09-04 | End: 2023-09-03

## 2023-09-03 RX ADMIN — OXYCODONE HYDROCHLORIDE 5 MG: 5 TABLET ORAL at 17:58

## 2023-09-03 RX ADMIN — BUTALBITAL, ACETAMINOPHEN, AND CAFFEINE 1 TABLET: 325; 50; 40 TABLET ORAL at 19:35

## 2023-09-03 RX ADMIN — ENOXAPARIN SODIUM 40 MG: 100 INJECTION SUBCUTANEOUS at 08:29

## 2023-09-03 RX ADMIN — SODIUM CHLORIDE, PRESERVATIVE FREE 10 ML: 5 INJECTION INTRAVENOUS at 21:43

## 2023-09-03 RX ADMIN — ASPIRIN 81 MG: 81 TABLET, COATED ORAL at 08:25

## 2023-09-03 RX ADMIN — GABAPENTIN 300 MG: 300 CAPSULE ORAL at 08:25

## 2023-09-03 RX ADMIN — BUSPIRONE HYDROCHLORIDE 15 MG: 10 TABLET ORAL at 21:39

## 2023-09-03 RX ADMIN — ROSUVASTATIN CALCIUM 40 MG: 40 TABLET, FILM COATED ORAL at 08:25

## 2023-09-03 RX ADMIN — SODIUM CHLORIDE, PRESERVATIVE FREE 10 ML: 5 INJECTION INTRAVENOUS at 08:31

## 2023-09-03 RX ADMIN — ALPRAZOLAM 2 MG: 0.5 TABLET ORAL at 21:40

## 2023-09-03 RX ADMIN — Medication 8 UNITS: at 16:57

## 2023-09-03 RX ADMIN — OXYCODONE HYDROCHLORIDE 5 MG: 5 TABLET ORAL at 11:25

## 2023-09-03 RX ADMIN — INSULIN GLARGINE 36 UNITS: 100 INJECTION, SOLUTION SUBCUTANEOUS at 08:30

## 2023-09-03 RX ADMIN — ARIPIPRAZOLE 30 MG: 5 TABLET ORAL at 08:24

## 2023-09-03 RX ADMIN — MORPHINE SULFATE 105 MG: 30 TABLET, FILM COATED, EXTENDED RELEASE ORAL at 21:39

## 2023-09-03 RX ADMIN — MORPHINE SULFATE 105 MG: 30 TABLET, FILM COATED, EXTENDED RELEASE ORAL at 08:53

## 2023-09-03 RX ADMIN — BUMETANIDE 3 MG: 1 TABLET ORAL at 08:26

## 2023-09-03 RX ADMIN — GABAPENTIN 300 MG: 300 CAPSULE ORAL at 14:54

## 2023-09-03 RX ADMIN — PANTOPRAZOLE SODIUM 40 MG: 40 TABLET, DELAYED RELEASE ORAL at 08:25

## 2023-09-03 RX ADMIN — BUTALBITAL, ACETAMINOPHEN, AND CAFFEINE 1 TABLET: 325; 50; 40 TABLET ORAL at 08:28

## 2023-09-03 RX ADMIN — BUMETANIDE 3 MG: 1 TABLET ORAL at 21:38

## 2023-09-03 RX ADMIN — BUSPIRONE HYDROCHLORIDE 15 MG: 10 TABLET ORAL at 14:54

## 2023-09-03 RX ADMIN — Medication 4 UNITS: at 08:30

## 2023-09-03 RX ADMIN — GABAPENTIN 300 MG: 300 CAPSULE ORAL at 21:38

## 2023-09-03 RX ADMIN — GUAIFENESIN 200 MG: 200 SOLUTION ORAL at 21:38

## 2023-09-03 RX ADMIN — ALPRAZOLAM 2 MG: 0.5 TABLET ORAL at 03:11

## 2023-09-03 RX ADMIN — ALPRAZOLAM 2 MG: 0.5 TABLET ORAL at 11:25

## 2023-09-03 RX ADMIN — Medication 6 UNITS: at 16:57

## 2023-09-03 RX ADMIN — BUSPIRONE HYDROCHLORIDE 15 MG: 10 TABLET ORAL at 08:28

## 2023-09-03 RX ADMIN — Medication 3 UNITS: at 11:28

## 2023-09-03 ASSESSMENT — PAIN SCALES - GENERAL
PAINLEVEL_OUTOF10: 9
PAINLEVEL_OUTOF10: 7
PAINLEVEL_OUTOF10: 3
PAINLEVEL_OUTOF10: 9
PAINLEVEL_OUTOF10: 3
PAINLEVEL_OUTOF10: 9
PAINLEVEL_OUTOF10: 9

## 2023-09-03 ASSESSMENT — PAIN DESCRIPTION - ORIENTATION
ORIENTATION: ANTERIOR;MID
ORIENTATION: ANTERIOR;MID
ORIENTATION: MID
ORIENTATION: ANTERIOR
ORIENTATION: ANTERIOR

## 2023-09-03 ASSESSMENT — PAIN DESCRIPTION - DESCRIPTORS
DESCRIPTORS: ACHING
DESCRIPTORS: ACHING;DISCOMFORT;CRAMPING
DESCRIPTORS: ACHING
DESCRIPTORS: DISCOMFORT;THROBBING
DESCRIPTORS: ACHING

## 2023-09-03 ASSESSMENT — PAIN DESCRIPTION - LOCATION
LOCATION: HEAD
LOCATION: PERINEUM

## 2023-09-04 PROBLEM — R73.9 HYPERGLYCEMIA: Status: ACTIVE | Noted: 2023-09-04

## 2023-09-04 LAB
ANION GAP SERPL CALC-SCNC: 6 MMOL/L (ref 5–15)
BACTERIA SPEC CULT: ABNORMAL
BASOPHILS # BLD: 0.1 K/UL (ref 0–0.1)
BASOPHILS NFR BLD: 1 % (ref 0–1)
BUN SERPL-MCNC: 30 MG/DL (ref 6–20)
BUN/CREAT SERPL: 22 (ref 12–20)
CALCIUM SERPL-MCNC: 8 MG/DL (ref 8.5–10.1)
CHLORIDE SERPL-SCNC: 87 MMOL/L (ref 97–108)
CO2 SERPL-SCNC: 31 MMOL/L (ref 21–32)
CREAT SERPL-MCNC: 1.39 MG/DL (ref 0.55–1.02)
DIFFERENTIAL METHOD BLD: ABNORMAL
EOSINOPHIL # BLD: 0.1 K/UL (ref 0–0.4)
EOSINOPHIL NFR BLD: 2 % (ref 0–7)
ERYTHROCYTE [DISTWIDTH] IN BLOOD BY AUTOMATED COUNT: 22.4 % (ref 11.5–14.5)
GLUCOSE BLD STRIP.AUTO-MCNC: 191 MG/DL (ref 65–117)
GLUCOSE BLD STRIP.AUTO-MCNC: 286 MG/DL (ref 65–117)
GLUCOSE BLD STRIP.AUTO-MCNC: 399 MG/DL (ref 65–117)
GLUCOSE BLD STRIP.AUTO-MCNC: 497 MG/DL (ref 65–117)
GLUCOSE SERPL-MCNC: 404 MG/DL (ref 65–100)
HCT VFR BLD AUTO: 29.9 % (ref 35–47)
HGB BLD-MCNC: 9 G/DL (ref 11.5–16)
IMM GRANULOCYTES # BLD AUTO: 0 K/UL (ref 0–0.04)
IMM GRANULOCYTES NFR BLD AUTO: 0 % (ref 0–0.5)
LYMPHOCYTES # BLD: 2.6 K/UL (ref 0.8–3.5)
LYMPHOCYTES NFR BLD: 48 % (ref 12–49)
MCH RBC QN AUTO: 21.8 PG (ref 26–34)
MCHC RBC AUTO-ENTMCNC: 30.1 G/DL (ref 30–36.5)
MCV RBC AUTO: 72.6 FL (ref 80–99)
MONOCYTES # BLD: 0.7 K/UL (ref 0–1)
MONOCYTES NFR BLD: 13 % (ref 5–13)
NEUTS SEG # BLD: 1.9 K/UL (ref 1.8–8)
NEUTS SEG NFR BLD: 36 % (ref 32–75)
NRBC # BLD: 0 K/UL (ref 0–0.01)
NRBC BLD-RTO: 0 PER 100 WBC
PLATELET # BLD AUTO: 325 K/UL (ref 150–400)
PMV BLD AUTO: 9.7 FL (ref 8.9–12.9)
POTASSIUM SERPL-SCNC: 4.3 MMOL/L (ref 3.5–5.1)
RBC # BLD AUTO: 4.12 M/UL (ref 3.8–5.2)
RBC MORPH BLD: ABNORMAL
SERVICE CMNT-IMP: ABNORMAL
SODIUM SERPL-SCNC: 124 MMOL/L (ref 136–145)
WBC # BLD AUTO: 5.4 K/UL (ref 3.6–11)

## 2023-09-04 PROCEDURE — 6360000002 HC RX W HCPCS: Performed by: INTERNAL MEDICINE

## 2023-09-04 PROCEDURE — G0378 HOSPITAL OBSERVATION PER HR: HCPCS

## 2023-09-04 PROCEDURE — 36415 COLL VENOUS BLD VENIPUNCTURE: CPT

## 2023-09-04 PROCEDURE — 82962 GLUCOSE BLOOD TEST: CPT

## 2023-09-04 PROCEDURE — 1100000003 HC PRIVATE W/ TELEMETRY

## 2023-09-04 PROCEDURE — 6370000000 HC RX 637 (ALT 250 FOR IP): Performed by: INTERNAL MEDICINE

## 2023-09-04 PROCEDURE — 6370000000 HC RX 637 (ALT 250 FOR IP): Performed by: GENERAL ACUTE CARE HOSPITAL

## 2023-09-04 PROCEDURE — 2500000003 HC RX 250 WO HCPCS: Performed by: NURSE PRACTITIONER

## 2023-09-04 PROCEDURE — 85025 COMPLETE CBC W/AUTO DIFF WBC: CPT

## 2023-09-04 PROCEDURE — 96372 THER/PROPH/DIAG INJ SC/IM: CPT

## 2023-09-04 PROCEDURE — 80048 BASIC METABOLIC PNL TOTAL CA: CPT

## 2023-09-04 PROCEDURE — 2580000003 HC RX 258: Performed by: INTERNAL MEDICINE

## 2023-09-04 RX ORDER — CASTOR OIL AND BALSAM, PERU 788; 87 MG/G; MG/G
OINTMENT TOPICAL 2 TIMES DAILY
Status: DISCONTINUED | OUTPATIENT
Start: 2023-09-04 | End: 2023-09-12 | Stop reason: HOSPADM

## 2023-09-04 RX ADMIN — GABAPENTIN 300 MG: 300 CAPSULE ORAL at 21:02

## 2023-09-04 RX ADMIN — GUAIFENESIN 200 MG: 200 SOLUTION ORAL at 23:13

## 2023-09-04 RX ADMIN — ALPRAZOLAM 2 MG: 0.5 TABLET ORAL at 21:02

## 2023-09-04 RX ADMIN — BUMETANIDE 3 MG: 1 TABLET ORAL at 08:36

## 2023-09-04 RX ADMIN — BUSPIRONE HYDROCHLORIDE 15 MG: 10 TABLET ORAL at 08:36

## 2023-09-04 RX ADMIN — ASPIRIN 81 MG: 81 TABLET, COATED ORAL at 08:35

## 2023-09-04 RX ADMIN — PANTOPRAZOLE SODIUM 40 MG: 40 TABLET, DELAYED RELEASE ORAL at 08:36

## 2023-09-04 RX ADMIN — SODIUM CHLORIDE, PRESERVATIVE FREE 10 ML: 5 INJECTION INTRAVENOUS at 21:03

## 2023-09-04 RX ADMIN — ALPRAZOLAM 2 MG: 0.5 TABLET ORAL at 08:34

## 2023-09-04 RX ADMIN — INSULIN GLARGINE 36 UNITS: 100 INJECTION, SOLUTION SUBCUTANEOUS at 08:41

## 2023-09-04 RX ADMIN — ROSUVASTATIN CALCIUM 40 MG: 40 TABLET, FILM COATED ORAL at 08:35

## 2023-09-04 RX ADMIN — BUSPIRONE HYDROCHLORIDE 15 MG: 10 TABLET ORAL at 21:04

## 2023-09-04 RX ADMIN — MORPHINE SULFATE 105 MG: 30 TABLET, FILM COATED, EXTENDED RELEASE ORAL at 21:02

## 2023-09-04 RX ADMIN — ARIPIPRAZOLE 30 MG: 5 TABLET ORAL at 08:31

## 2023-09-04 RX ADMIN — Medication 6 UNITS: at 17:07

## 2023-09-04 RX ADMIN — Medication 2 UNITS: at 17:09

## 2023-09-04 RX ADMIN — ENOXAPARIN SODIUM 40 MG: 100 INJECTION SUBCUTANEOUS at 08:37

## 2023-09-04 RX ADMIN — Medication: at 21:02

## 2023-09-04 RX ADMIN — SODIUM CHLORIDE, PRESERVATIVE FREE 10 ML: 5 INJECTION INTRAVENOUS at 08:42

## 2023-09-04 RX ADMIN — GABAPENTIN 300 MG: 300 CAPSULE ORAL at 08:34

## 2023-09-04 RX ADMIN — Medication 6 UNITS: at 12:04

## 2023-09-04 RX ADMIN — Medication 6 UNITS: at 08:43

## 2023-09-04 RX ADMIN — Medication 6 UNITS: at 12:05

## 2023-09-04 RX ADMIN — BUMETANIDE 3 MG: 1 TABLET ORAL at 21:02

## 2023-09-04 RX ADMIN — MORPHINE SULFATE 105 MG: 30 TABLET, FILM COATED, EXTENDED RELEASE ORAL at 08:32

## 2023-09-04 RX ADMIN — BUSPIRONE HYDROCHLORIDE 15 MG: 10 TABLET ORAL at 12:18

## 2023-09-04 RX ADMIN — GABAPENTIN 300 MG: 300 CAPSULE ORAL at 13:50

## 2023-09-04 RX ADMIN — Medication 6 UNITS: at 08:39

## 2023-09-04 ASSESSMENT — PAIN DESCRIPTION - ONSET: ONSET: ON-GOING

## 2023-09-04 ASSESSMENT — PAIN DESCRIPTION - PAIN TYPE: TYPE: CHRONIC PAIN

## 2023-09-04 ASSESSMENT — PAIN - FUNCTIONAL ASSESSMENT: PAIN_FUNCTIONAL_ASSESSMENT: ACTIVITIES ARE NOT PREVENTED

## 2023-09-04 ASSESSMENT — PAIN SCALES - GENERAL
PAINLEVEL_OUTOF10: 0
PAINLEVEL_OUTOF10: 0
PAINLEVEL_OUTOF10: 7

## 2023-09-04 ASSESSMENT — PAIN DESCRIPTION - DESCRIPTORS: DESCRIPTORS: ACHING

## 2023-09-04 ASSESSMENT — PAIN DESCRIPTION - ORIENTATION: ORIENTATION: RIGHT;LEFT

## 2023-09-04 ASSESSMENT — PAIN DESCRIPTION - FREQUENCY: FREQUENCY: INTERMITTENT

## 2023-09-04 ASSESSMENT — PAIN DESCRIPTION - LOCATION: LOCATION: LEG

## 2023-09-04 NOTE — CARE COORDINATION
Care Management Initial Assessment       RUR:n/a  Readmission? Yes d/c home 8/13/23  1st IM letter given? No   1st  letter given: No     09/04/23 7564   Service Assessment   Patient Orientation Alert and Oriented   Cognition Alert   History Provided By Patient   Primary Caregiver Self   Support Systems Spouse/Significant Other   Patient's Healthcare Decision Maker is: Legal Next of Kin   PCP Verified by CM Yes   Last Visit to PCP Within last 3 months   Prior Functional Level Independent in ADLs/IADLs   Current Functional Level Independent in ADLs/IADLs   Can patient return to prior living arrangement Yes   Ability to make needs known: Good   Family able to assist with home care needs: Yes   Would you like for me to discuss the discharge plan with any other family members/significant others, and if so, who? No   Financial Resources [employer] None   Social/Functional History   Lives With Significant other   Type of Home House   Home Equipment Oxygen; kasi Garcia  (02 through [place])   Receives Help From [employer] Responsibilities Yes   Ambulation Assistance Independent   Transfer Assistance Independent   Active  No   Mode of Transportation Car   Occupation On disability   Discharge Planning   Type of Residence Other (Comment)   Living Arrangements Spouse/Significant Other   Current Services Prior To Admission None   Current DME Prior to [employer] Needed N/A   DME Ordered? No   Potential Assistance Purchasing Medications No   Type of Home Care Services None   Patient expects to be discharged to: House   History of falls? 0   Services At/After Discharge   Transition of Care Consult (CM Consult) N/A   Services At/After Discharge None   [employer] Information Provided?  No   Mode of Transport at Discharge Other (see comment)  (family vs roundtrip)   Confirm Follow Up Transport

## 2023-09-05 LAB
ANION GAP SERPL CALC-SCNC: 4 MMOL/L (ref 5–15)
BASOPHILS # BLD: 0 K/UL (ref 0–0.1)
BASOPHILS NFR BLD: 0 % (ref 0–1)
BUN SERPL-MCNC: 29 MG/DL (ref 6–20)
BUN/CREAT SERPL: 21 (ref 12–20)
CALCIUM SERPL-MCNC: 8.4 MG/DL (ref 8.5–10.1)
CHLORIDE SERPL-SCNC: 86 MMOL/L (ref 97–108)
CO2 SERPL-SCNC: 33 MMOL/L (ref 21–32)
CREAT SERPL-MCNC: 1.35 MG/DL (ref 0.55–1.02)
DIFFERENTIAL METHOD BLD: ABNORMAL
EOSINOPHIL # BLD: 0.1 K/UL (ref 0–0.4)
EOSINOPHIL NFR BLD: 2 % (ref 0–7)
ERYTHROCYTE [DISTWIDTH] IN BLOOD BY AUTOMATED COUNT: 21.7 % (ref 11.5–14.5)
GLUCOSE BLD STRIP.AUTO-MCNC: 310 MG/DL (ref 65–117)
GLUCOSE BLD STRIP.AUTO-MCNC: 353 MG/DL (ref 65–117)
GLUCOSE BLD STRIP.AUTO-MCNC: 363 MG/DL (ref 65–117)
GLUCOSE BLD STRIP.AUTO-MCNC: 407 MG/DL (ref 65–117)
GLUCOSE SERPL-MCNC: 348 MG/DL (ref 65–100)
HCT VFR BLD AUTO: 28.4 % (ref 35–47)
HGB BLD-MCNC: 8.5 G/DL (ref 11.5–16)
IMM GRANULOCYTES # BLD AUTO: 0 K/UL (ref 0–0.04)
IMM GRANULOCYTES NFR BLD AUTO: 0 % (ref 0–0.5)
LYMPHOCYTES # BLD: 2.4 K/UL (ref 0.8–3.5)
LYMPHOCYTES NFR BLD: 41 % (ref 12–49)
MCH RBC QN AUTO: 21.4 PG (ref 26–34)
MCHC RBC AUTO-ENTMCNC: 29.9 G/DL (ref 30–36.5)
MCV RBC AUTO: 71.5 FL (ref 80–99)
MONOCYTES # BLD: 0.6 K/UL (ref 0–1)
MONOCYTES NFR BLD: 10 % (ref 5–13)
NEUTS SEG # BLD: 2.7 K/UL (ref 1.8–8)
NEUTS SEG NFR BLD: 47 % (ref 32–75)
NRBC # BLD: 0 K/UL (ref 0–0.01)
NRBC BLD-RTO: 0 PER 100 WBC
PLATELET # BLD AUTO: 325 K/UL (ref 150–400)
PMV BLD AUTO: 9.2 FL (ref 8.9–12.9)
POTASSIUM SERPL-SCNC: 4 MMOL/L (ref 3.5–5.1)
RBC # BLD AUTO: 3.97 M/UL (ref 3.8–5.2)
RBC MORPH BLD: ABNORMAL
SERVICE CMNT-IMP: ABNORMAL
SODIUM SERPL-SCNC: 123 MMOL/L (ref 136–145)
WBC # BLD AUTO: 5.8 K/UL (ref 3.6–11)

## 2023-09-05 PROCEDURE — 85025 COMPLETE CBC W/AUTO DIFF WBC: CPT

## 2023-09-05 PROCEDURE — 2580000003 HC RX 258: Performed by: INTERNAL MEDICINE

## 2023-09-05 PROCEDURE — 6370000000 HC RX 637 (ALT 250 FOR IP): Performed by: INTERNAL MEDICINE

## 2023-09-05 PROCEDURE — 6360000002 HC RX W HCPCS: Performed by: INTERNAL MEDICINE

## 2023-09-05 PROCEDURE — 80048 BASIC METABOLIC PNL TOTAL CA: CPT

## 2023-09-05 PROCEDURE — 2500000003 HC RX 250 WO HCPCS: Performed by: NURSE PRACTITIONER

## 2023-09-05 PROCEDURE — 82962 GLUCOSE BLOOD TEST: CPT

## 2023-09-05 PROCEDURE — 6370000000 HC RX 637 (ALT 250 FOR IP): Performed by: GENERAL ACUTE CARE HOSPITAL

## 2023-09-05 PROCEDURE — 1100000003 HC PRIVATE W/ TELEMETRY

## 2023-09-05 PROCEDURE — 36415 COLL VENOUS BLD VENIPUNCTURE: CPT

## 2023-09-05 RX ORDER — INSULIN LISPRO 100 [IU]/ML
8 INJECTION, SOLUTION INTRAVENOUS; SUBCUTANEOUS
Status: DISCONTINUED | OUTPATIENT
Start: 2023-09-05 | End: 2023-09-06

## 2023-09-05 RX ORDER — INSULIN GLARGINE 100 [IU]/ML
40 INJECTION, SOLUTION SUBCUTANEOUS DAILY
Status: DISCONTINUED | OUTPATIENT
Start: 2023-09-05 | End: 2023-09-06

## 2023-09-05 RX ORDER — AMMONIUM LACTATE 12 G/100G
LOTION TOPICAL 2 TIMES DAILY
Status: DISCONTINUED | OUTPATIENT
Start: 2023-09-05 | End: 2023-09-12 | Stop reason: HOSPADM

## 2023-09-05 RX ADMIN — MORPHINE SULFATE 105 MG: 30 TABLET, FILM COATED, EXTENDED RELEASE ORAL at 20:57

## 2023-09-05 RX ADMIN — Medication 4 UNITS: at 17:40

## 2023-09-05 RX ADMIN — GABAPENTIN 300 MG: 300 CAPSULE ORAL at 13:30

## 2023-09-05 RX ADMIN — BUMETANIDE 3 MG: 1 TABLET ORAL at 20:58

## 2023-09-05 RX ADMIN — Medication 8 UNITS: at 13:29

## 2023-09-05 RX ADMIN — ARIPIPRAZOLE 30 MG: 5 TABLET ORAL at 08:55

## 2023-09-05 RX ADMIN — Medication: at 20:57

## 2023-09-05 RX ADMIN — SODIUM CHLORIDE, PRESERVATIVE FREE 10 ML: 5 INJECTION INTRAVENOUS at 20:57

## 2023-09-05 RX ADMIN — ALPRAZOLAM 2 MG: 0.5 TABLET ORAL at 12:54

## 2023-09-05 RX ADMIN — BUTALBITAL, ACETAMINOPHEN, AND CAFFEINE 1 TABLET: 325; 50; 40 TABLET ORAL at 13:35

## 2023-09-05 RX ADMIN — Medication 8 UNITS: at 07:45

## 2023-09-05 RX ADMIN — Medication: at 20:56

## 2023-09-05 RX ADMIN — Medication 6 UNITS: at 13:26

## 2023-09-05 RX ADMIN — GUAIFENESIN 200 MG: 200 SOLUTION ORAL at 20:57

## 2023-09-05 RX ADMIN — Medication: at 08:55

## 2023-09-05 RX ADMIN — ASPIRIN 81 MG: 81 TABLET, COATED ORAL at 07:45

## 2023-09-05 RX ADMIN — OXYCODONE HYDROCHLORIDE 5 MG: 5 TABLET ORAL at 17:39

## 2023-09-05 RX ADMIN — ROSUVASTATIN CALCIUM 40 MG: 40 TABLET, FILM COATED ORAL at 07:44

## 2023-09-05 RX ADMIN — GABAPENTIN 300 MG: 300 CAPSULE ORAL at 20:58

## 2023-09-05 RX ADMIN — PANTOPRAZOLE SODIUM 40 MG: 40 TABLET, DELAYED RELEASE ORAL at 07:44

## 2023-09-05 RX ADMIN — ENOXAPARIN SODIUM 40 MG: 100 INJECTION SUBCUTANEOUS at 07:43

## 2023-09-05 RX ADMIN — INSULIN GLARGINE 40 UNITS: 100 INJECTION, SOLUTION SUBCUTANEOUS at 07:42

## 2023-09-05 RX ADMIN — BUSPIRONE HYDROCHLORIDE 15 MG: 10 TABLET ORAL at 13:30

## 2023-09-05 RX ADMIN — MORPHINE SULFATE 105 MG: 30 TABLET, FILM COATED, EXTENDED RELEASE ORAL at 07:43

## 2023-09-05 RX ADMIN — BUMETANIDE 3 MG: 1 TABLET ORAL at 07:44

## 2023-09-05 RX ADMIN — ALPRAZOLAM 2 MG: 0.5 TABLET ORAL at 20:58

## 2023-09-05 RX ADMIN — Medication 8 UNITS: at 17:41

## 2023-09-05 RX ADMIN — SODIUM CHLORIDE, PRESERVATIVE FREE 10 ML: 5 INJECTION INTRAVENOUS at 08:55

## 2023-09-05 RX ADMIN — GABAPENTIN 300 MG: 300 CAPSULE ORAL at 07:45

## 2023-09-05 RX ADMIN — BUSPIRONE HYDROCHLORIDE 15 MG: 10 TABLET ORAL at 07:45

## 2023-09-05 RX ADMIN — BUSPIRONE HYDROCHLORIDE 15 MG: 10 TABLET ORAL at 20:58

## 2023-09-05 ASSESSMENT — PAIN DESCRIPTION - LOCATION
LOCATION: HEAD
LOCATION: ABDOMEN
LOCATION: ABDOMEN
LOCATION: HEAD
LOCATION: ABDOMEN;LEG

## 2023-09-05 ASSESSMENT — PAIN DESCRIPTION - ORIENTATION
ORIENTATION: LOWER
ORIENTATION: ANTERIOR;MID
ORIENTATION: LOWER;MID
ORIENTATION: LOWER;MID

## 2023-09-05 ASSESSMENT — PAIN SCALES - GENERAL
PAINLEVEL_OUTOF10: 9
PAINLEVEL_OUTOF10: 0
PAINLEVEL_OUTOF10: 5
PAINLEVEL_OUTOF10: 5
PAINLEVEL_OUTOF10: 7
PAINLEVEL_OUTOF10: 6
PAINLEVEL_OUTOF10: 6

## 2023-09-05 ASSESSMENT — PAIN - FUNCTIONAL ASSESSMENT: PAIN_FUNCTIONAL_ASSESSMENT: ACTIVITIES ARE NOT PREVENTED

## 2023-09-05 ASSESSMENT — PAIN DESCRIPTION - PAIN TYPE
TYPE: CHRONIC PAIN
TYPE: CHRONIC PAIN

## 2023-09-05 ASSESSMENT — PAIN DESCRIPTION - DESCRIPTORS
DESCRIPTORS: ACHING
DESCRIPTORS: ACHING
DESCRIPTORS: ACHING;DISCOMFORT
DESCRIPTORS: ACHING;CRAMPING

## 2023-09-05 ASSESSMENT — PAIN DESCRIPTION - FREQUENCY: FREQUENCY: CONTINUOUS

## 2023-09-05 NOTE — WOUND CARE
Wound Care consult for Bilateral lower leg cellulitis (?), POA. Chart reviewed and patient assessed. Pt. Is very familiar to this wound care nurse from past care provided to her. Pt. Always has edema - sometimes much worse with leaking wounds and other times smaller amount and dry wounds. Assessment today. The wounds are dry with scabbing. No drainage. The skin around the wounds is also dry but the legs have edema. This is the best condition I have ever seen her legs. She just needs leg cleansing and then Lac-Hydrin lotion on both lower legs. The heels are intact. No s/sx of infection in the lower legs. Treatment Recommendation: Cleanse the legs and feet with soap and water and dry well. Apply the Lac-Hydrin lotion 12% to the skin of both legs and feet and let it absorb into the skin well. Leave open to air. Float the heels. Nursing present when the plan was discussed with the patient. Plan: orders written for the above planned wound care. Pressure injury prevention.    Samanta Holguin RN, BSN, [employer]

## 2023-09-06 LAB
ANION GAP SERPL CALC-SCNC: 5 MMOL/L (ref 5–15)
ANION GAP SERPL CALC-SCNC: 6 MMOL/L (ref 5–15)
BASOPHILS # BLD: 0 K/UL (ref 0–0.1)
BASOPHILS NFR BLD: 0 % (ref 0–1)
BUN SERPL-MCNC: 26 MG/DL (ref 6–20)
BUN SERPL-MCNC: 28 MG/DL (ref 6–20)
BUN/CREAT SERPL: 21 (ref 12–20)
BUN/CREAT SERPL: 23 (ref 12–20)
CALCIUM SERPL-MCNC: 8.5 MG/DL (ref 8.5–10.1)
CALCIUM SERPL-MCNC: 8.9 MG/DL (ref 8.5–10.1)
CHLORIDE SERPL-SCNC: 84 MMOL/L (ref 97–108)
CHLORIDE SERPL-SCNC: 84 MMOL/L (ref 97–108)
CO2 SERPL-SCNC: 33 MMOL/L (ref 21–32)
CO2 SERPL-SCNC: 33 MMOL/L (ref 21–32)
CREAT SERPL-MCNC: 1.24 MG/DL (ref 0.55–1.02)
CREAT SERPL-MCNC: 1.25 MG/DL (ref 0.55–1.02)
DIFFERENTIAL METHOD BLD: ABNORMAL
EOSINOPHIL # BLD: 0.1 K/UL (ref 0–0.4)
EOSINOPHIL NFR BLD: 2 % (ref 0–7)
ERYTHROCYTE [DISTWIDTH] IN BLOOD BY AUTOMATED COUNT: 21.8 % (ref 11.5–14.5)
GLUCOSE BLD STRIP.AUTO-MCNC: 213 MG/DL (ref 65–117)
GLUCOSE BLD STRIP.AUTO-MCNC: 251 MG/DL (ref 65–117)
GLUCOSE BLD STRIP.AUTO-MCNC: 254 MG/DL (ref 65–117)
GLUCOSE BLD STRIP.AUTO-MCNC: 269 MG/DL (ref 65–117)
GLUCOSE BLD STRIP.AUTO-MCNC: 303 MG/DL (ref 65–117)
GLUCOSE BLD STRIP.AUTO-MCNC: 311 MG/DL (ref 65–117)
GLUCOSE SERPL-MCNC: 196 MG/DL (ref 65–100)
GLUCOSE SERPL-MCNC: 263 MG/DL (ref 65–100)
HCT VFR BLD AUTO: 28.9 % (ref 35–47)
HGB BLD-MCNC: 8.8 G/DL (ref 11.5–16)
IMM GRANULOCYTES # BLD AUTO: 0 K/UL (ref 0–0.04)
IMM GRANULOCYTES NFR BLD AUTO: 0 % (ref 0–0.5)
LYMPHOCYTES # BLD: 2.3 K/UL (ref 0.8–3.5)
LYMPHOCYTES NFR BLD: 40 % (ref 12–49)
MCH RBC QN AUTO: 21.5 PG (ref 26–34)
MCHC RBC AUTO-ENTMCNC: 30.4 G/DL (ref 30–36.5)
MCV RBC AUTO: 70.5 FL (ref 80–99)
MONOCYTES # BLD: 0.6 K/UL (ref 0–1)
MONOCYTES NFR BLD: 11 % (ref 5–13)
NEUTS SEG # BLD: 2.8 K/UL (ref 1.8–8)
NEUTS SEG NFR BLD: 47 % (ref 32–75)
NRBC # BLD: 0 K/UL (ref 0–0.01)
NRBC BLD-RTO: 0 PER 100 WBC
PLATELET # BLD AUTO: 334 K/UL (ref 150–400)
PMV BLD AUTO: 9.7 FL (ref 8.9–12.9)
POTASSIUM SERPL-SCNC: 4 MMOL/L (ref 3.5–5.1)
POTASSIUM SERPL-SCNC: 4 MMOL/L (ref 3.5–5.1)
RBC # BLD AUTO: 4.1 M/UL (ref 3.8–5.2)
RBC MORPH BLD: ABNORMAL
SERVICE CMNT-IMP: ABNORMAL
SODIUM SERPL-SCNC: 122 MMOL/L (ref 136–145)
SODIUM SERPL-SCNC: 123 MMOL/L (ref 136–145)
SODIUM SERPL-SCNC: 123 MMOL/L (ref 136–145)
WBC # BLD AUTO: 5.8 K/UL (ref 3.6–11)

## 2023-09-06 PROCEDURE — 85025 COMPLETE CBC W/AUTO DIFF WBC: CPT

## 2023-09-06 PROCEDURE — 80048 BASIC METABOLIC PNL TOTAL CA: CPT

## 2023-09-06 PROCEDURE — 6360000002 HC RX W HCPCS: Performed by: INTERNAL MEDICINE

## 2023-09-06 PROCEDURE — 84300 ASSAY OF URINE SODIUM: CPT

## 2023-09-06 PROCEDURE — 2580000003 HC RX 258: Performed by: INTERNAL MEDICINE

## 2023-09-06 PROCEDURE — 36415 COLL VENOUS BLD VENIPUNCTURE: CPT

## 2023-09-06 PROCEDURE — 83935 ASSAY OF URINE OSMOLALITY: CPT

## 2023-09-06 PROCEDURE — 6370000000 HC RX 637 (ALT 250 FOR IP): Performed by: INTERNAL MEDICINE

## 2023-09-06 PROCEDURE — 82962 GLUCOSE BLOOD TEST: CPT

## 2023-09-06 PROCEDURE — 6370000000 HC RX 637 (ALT 250 FOR IP): Performed by: GENERAL ACUTE CARE HOSPITAL

## 2023-09-06 PROCEDURE — 1100000003 HC PRIVATE W/ TELEMETRY

## 2023-09-06 PROCEDURE — 84295 ASSAY OF SERUM SODIUM: CPT

## 2023-09-06 RX ORDER — INSULIN GLARGINE 100 [IU]/ML
42 INJECTION, SOLUTION SUBCUTANEOUS DAILY
Status: DISCONTINUED | OUTPATIENT
Start: 2023-09-06 | End: 2023-09-08

## 2023-09-06 RX ORDER — SODIUM CHLORIDE 9 MG/ML
INJECTION, SOLUTION INTRAVENOUS CONTINUOUS
Status: ACTIVE | OUTPATIENT
Start: 2023-09-06 | End: 2023-09-07

## 2023-09-06 RX ORDER — INSULIN LISPRO 100 [IU]/ML
0-6 INJECTION, SOLUTION INTRAVENOUS; SUBCUTANEOUS
Status: DISCONTINUED | OUTPATIENT
Start: 2023-09-06 | End: 2023-09-10

## 2023-09-06 RX ORDER — INSULIN LISPRO 100 [IU]/ML
12 INJECTION, SOLUTION INTRAVENOUS; SUBCUTANEOUS
Status: DISCONTINUED | OUTPATIENT
Start: 2023-09-06 | End: 2023-09-10

## 2023-09-06 RX ADMIN — OXYCODONE HYDROCHLORIDE 5 MG: 5 TABLET ORAL at 01:05

## 2023-09-06 RX ADMIN — Medication 4 UNITS: at 09:15

## 2023-09-06 RX ADMIN — BUTALBITAL, ACETAMINOPHEN, AND CAFFEINE 1 TABLET: 325; 50; 40 TABLET ORAL at 05:16

## 2023-09-06 RX ADMIN — ASPIRIN 81 MG: 81 TABLET, COATED ORAL at 09:12

## 2023-09-06 RX ADMIN — ARIPIPRAZOLE 30 MG: 5 TABLET ORAL at 09:11

## 2023-09-06 RX ADMIN — Medication: at 20:59

## 2023-09-06 RX ADMIN — MORPHINE SULFATE 105 MG: 30 TABLET, FILM COATED, EXTENDED RELEASE ORAL at 20:57

## 2023-09-06 RX ADMIN — PANTOPRAZOLE SODIUM 40 MG: 40 TABLET, DELAYED RELEASE ORAL at 09:17

## 2023-09-06 RX ADMIN — Medication 2 UNITS: at 17:24

## 2023-09-06 RX ADMIN — GABAPENTIN 300 MG: 300 CAPSULE ORAL at 13:25

## 2023-09-06 RX ADMIN — Medication: at 09:19

## 2023-09-06 RX ADMIN — ALPRAZOLAM 2 MG: 0.5 TABLET ORAL at 20:58

## 2023-09-06 RX ADMIN — BUSPIRONE HYDROCHLORIDE 15 MG: 10 TABLET ORAL at 09:11

## 2023-09-06 RX ADMIN — MORPHINE SULFATE 105 MG: 30 TABLET, FILM COATED, EXTENDED RELEASE ORAL at 09:11

## 2023-09-06 RX ADMIN — BUSPIRONE HYDROCHLORIDE 15 MG: 10 TABLET ORAL at 13:25

## 2023-09-06 RX ADMIN — GABAPENTIN 300 MG: 300 CAPSULE ORAL at 20:58

## 2023-09-06 RX ADMIN — INSULIN GLARGINE 42 UNITS: 100 INJECTION, SOLUTION SUBCUTANEOUS at 09:13

## 2023-09-06 RX ADMIN — GABAPENTIN 300 MG: 300 CAPSULE ORAL at 09:11

## 2023-09-06 RX ADMIN — BUSPIRONE HYDROCHLORIDE 15 MG: 10 TABLET ORAL at 20:56

## 2023-09-06 RX ADMIN — Medication: at 09:20

## 2023-09-06 RX ADMIN — SODIUM CHLORIDE: 9 INJECTION, SOLUTION INTRAVENOUS at 18:35

## 2023-09-06 RX ADMIN — ENOXAPARIN SODIUM 40 MG: 100 INJECTION SUBCUTANEOUS at 09:09

## 2023-09-06 RX ADMIN — Medication 3 UNITS: at 11:43

## 2023-09-06 RX ADMIN — Medication 12 UNITS: at 09:13

## 2023-09-06 RX ADMIN — ROSUVASTATIN CALCIUM 40 MG: 40 TABLET, FILM COATED ORAL at 09:17

## 2023-09-06 RX ADMIN — ALPRAZOLAM 2 MG: 0.5 TABLET ORAL at 13:25

## 2023-09-06 RX ADMIN — SODIUM CHLORIDE, PRESERVATIVE FREE 10 ML: 5 INJECTION INTRAVENOUS at 21:00

## 2023-09-06 RX ADMIN — Medication 12 UNITS: at 11:43

## 2023-09-06 RX ADMIN — SODIUM CHLORIDE, PRESERVATIVE FREE 10 ML: 5 INJECTION INTRAVENOUS at 09:19

## 2023-09-06 RX ADMIN — Medication 12 UNITS: at 17:24

## 2023-09-06 ASSESSMENT — PAIN DESCRIPTION - LOCATION
LOCATION: HEAD
LOCATION: ABDOMEN;PELVIS
LOCATION: ABDOMEN
LOCATION: ABDOMEN;HEAD

## 2023-09-06 ASSESSMENT — PAIN SCALES - GENERAL
PAINLEVEL_OUTOF10: 8
PAINLEVEL_OUTOF10: 6
PAINLEVEL_OUTOF10: 4
PAINLEVEL_OUTOF10: 8
PAINLEVEL_OUTOF10: 7
PAINLEVEL_OUTOF10: 8
PAINLEVEL_OUTOF10: 8
PAINLEVEL_OUTOF10: 5

## 2023-09-06 ASSESSMENT — PAIN - FUNCTIONAL ASSESSMENT
PAIN_FUNCTIONAL_ASSESSMENT: ACTIVITIES ARE NOT PREVENTED
PAIN_FUNCTIONAL_ASSESSMENT: ACTIVITIES ARE NOT PREVENTED

## 2023-09-06 ASSESSMENT — PAIN DESCRIPTION - DESCRIPTORS
DESCRIPTORS: ACHING
DESCRIPTORS: ACHING

## 2023-09-06 ASSESSMENT — PAIN DESCRIPTION - PAIN TYPE: TYPE: CHRONIC PAIN

## 2023-09-06 ASSESSMENT — PAIN DESCRIPTION - FREQUENCY: FREQUENCY: CONTINUOUS

## 2023-09-06 ASSESSMENT — PAIN DESCRIPTION - ORIENTATION
ORIENTATION: LOWER
ORIENTATION: ANTERIOR

## 2023-09-06 ASSESSMENT — PAIN DESCRIPTION - ONSET: ONSET: ON-GOING

## 2023-09-07 ENCOUNTER — TELEPHONE (OUTPATIENT)
Age: 53
End: 2023-09-07

## 2023-09-07 LAB
ANION GAP SERPL CALC-SCNC: 7 MMOL/L (ref 5–15)
BUN SERPL-MCNC: 25 MG/DL (ref 6–20)
BUN/CREAT SERPL: 21 (ref 12–20)
CALCIUM SERPL-MCNC: 8.4 MG/DL (ref 8.5–10.1)
CHLORIDE SERPL-SCNC: 84 MMOL/L (ref 97–108)
CO2 SERPL-SCNC: 31 MMOL/L (ref 21–32)
CREAT SERPL-MCNC: 1.18 MG/DL (ref 0.55–1.02)
GLUCOSE BLD STRIP.AUTO-MCNC: 161 MG/DL (ref 65–117)
GLUCOSE BLD STRIP.AUTO-MCNC: 210 MG/DL (ref 65–117)
GLUCOSE BLD STRIP.AUTO-MCNC: 321 MG/DL (ref 65–117)
GLUCOSE BLD STRIP.AUTO-MCNC: 374 MG/DL (ref 65–117)
GLUCOSE BLD STRIP.AUTO-MCNC: 382 MG/DL (ref 65–117)
GLUCOSE SERPL-MCNC: 289 MG/DL (ref 65–100)
OSMOLALITY UR: 289 MOSM/KG H2O
POTASSIUM SERPL-SCNC: 3.9 MMOL/L (ref 3.5–5.1)
SERVICE CMNT-IMP: ABNORMAL
SODIUM SERPL-SCNC: 122 MMOL/L (ref 136–145)
SODIUM UR-SCNC: 13 MMOL/L

## 2023-09-07 PROCEDURE — 36415 COLL VENOUS BLD VENIPUNCTURE: CPT

## 2023-09-07 PROCEDURE — 2580000003 HC RX 258: Performed by: INTERNAL MEDICINE

## 2023-09-07 PROCEDURE — 82962 GLUCOSE BLOOD TEST: CPT

## 2023-09-07 PROCEDURE — 80048 BASIC METABOLIC PNL TOTAL CA: CPT

## 2023-09-07 PROCEDURE — 6370000000 HC RX 637 (ALT 250 FOR IP): Performed by: INTERNAL MEDICINE

## 2023-09-07 PROCEDURE — 6360000002 HC RX W HCPCS: Performed by: INTERNAL MEDICINE

## 2023-09-07 PROCEDURE — 1100000003 HC PRIVATE W/ TELEMETRY

## 2023-09-07 RX ADMIN — ALPRAZOLAM 2 MG: 0.5 TABLET ORAL at 08:39

## 2023-09-07 RX ADMIN — BUSPIRONE HYDROCHLORIDE 15 MG: 10 TABLET ORAL at 08:40

## 2023-09-07 RX ADMIN — Medication 1 UNITS: at 17:36

## 2023-09-07 RX ADMIN — Medication 12 UNITS: at 12:02

## 2023-09-07 RX ADMIN — Medication: at 08:43

## 2023-09-07 RX ADMIN — MORPHINE SULFATE 105 MG: 30 TABLET, FILM COATED, EXTENDED RELEASE ORAL at 08:39

## 2023-09-07 RX ADMIN — GABAPENTIN 300 MG: 300 CAPSULE ORAL at 14:20

## 2023-09-07 RX ADMIN — GABAPENTIN 300 MG: 300 CAPSULE ORAL at 21:49

## 2023-09-07 RX ADMIN — Medication: at 21:54

## 2023-09-07 RX ADMIN — PANTOPRAZOLE SODIUM 40 MG: 40 TABLET, DELAYED RELEASE ORAL at 08:40

## 2023-09-07 RX ADMIN — Medication 12 UNITS: at 17:36

## 2023-09-07 RX ADMIN — SODIUM CHLORIDE, PRESERVATIVE FREE 10 ML: 5 INJECTION INTRAVENOUS at 08:42

## 2023-09-07 RX ADMIN — BUMETANIDE 3 MG: 1 TABLET ORAL at 21:49

## 2023-09-07 RX ADMIN — ARIPIPRAZOLE 30 MG: 5 TABLET ORAL at 08:53

## 2023-09-07 RX ADMIN — SODIUM CHLORIDE, PRESERVATIVE FREE 10 ML: 5 INJECTION INTRAVENOUS at 21:56

## 2023-09-07 RX ADMIN — OXYCODONE HYDROCHLORIDE 5 MG: 5 TABLET ORAL at 14:20

## 2023-09-07 RX ADMIN — Medication 5 UNITS: at 08:37

## 2023-09-07 RX ADMIN — ASPIRIN 81 MG: 81 TABLET, COATED ORAL at 08:40

## 2023-09-07 RX ADMIN — Medication 6 UNITS: at 12:02

## 2023-09-07 RX ADMIN — ALPRAZOLAM 2 MG: 0.5 TABLET ORAL at 21:49

## 2023-09-07 RX ADMIN — INSULIN GLARGINE 42 UNITS: 100 INJECTION, SOLUTION SUBCUTANEOUS at 08:39

## 2023-09-07 RX ADMIN — BUSPIRONE HYDROCHLORIDE 15 MG: 10 TABLET ORAL at 21:49

## 2023-09-07 RX ADMIN — GABAPENTIN 300 MG: 300 CAPSULE ORAL at 08:41

## 2023-09-07 RX ADMIN — ENOXAPARIN SODIUM 40 MG: 100 INJECTION SUBCUTANEOUS at 08:39

## 2023-09-07 RX ADMIN — ROSUVASTATIN CALCIUM 40 MG: 40 TABLET, FILM COATED ORAL at 08:41

## 2023-09-07 RX ADMIN — MORPHINE SULFATE 105 MG: 30 TABLET, FILM COATED, EXTENDED RELEASE ORAL at 21:47

## 2023-09-07 RX ADMIN — BUTALBITAL, ACETAMINOPHEN, AND CAFFEINE 1 TABLET: 325; 50; 40 TABLET ORAL at 12:07

## 2023-09-07 RX ADMIN — Medication 12 UNITS: at 08:38

## 2023-09-07 RX ADMIN — BUSPIRONE HYDROCHLORIDE 15 MG: 10 TABLET ORAL at 14:20

## 2023-09-07 ASSESSMENT — PAIN DESCRIPTION - DESCRIPTORS
DESCRIPTORS: ACHING

## 2023-09-07 ASSESSMENT — PAIN DESCRIPTION - LOCATION
LOCATION: HEAD
LOCATION: GENERALIZED
LOCATION: GENERALIZED
LOCATION: HEAD
LOCATION: GENERALIZED
LOCATION: OTHER (COMMENT)

## 2023-09-07 ASSESSMENT — PAIN SCALES - GENERAL
PAINLEVEL_OUTOF10: 8
PAINLEVEL_OUTOF10: 3
PAINLEVEL_OUTOF10: 9
PAINLEVEL_OUTOF10: 9
PAINLEVEL_OUTOF10: 8
PAINLEVEL_OUTOF10: 8
PAINLEVEL_OUTOF10: 5

## 2023-09-07 ASSESSMENT — PAIN DESCRIPTION - ORIENTATION
ORIENTATION: ANTERIOR;POSTERIOR

## 2023-09-07 ASSESSMENT — PAIN DESCRIPTION - PAIN TYPE
TYPE: CHRONIC PAIN

## 2023-09-07 NOTE — TELEPHONE ENCOUNTER
Patient notified of message per Dr. Bala Chaudhry and voiced understanding of what was read to them.

## 2023-09-07 NOTE — TELEPHONE ENCOUNTER
Pt LVM stating she would like to be discharged. She states she can monitor her sugars at home. Pt says she has a new meter and her insurance will cover the [place]. She asked that Dr Marcellus Madison call her.

## 2023-09-07 NOTE — TELEPHONE ENCOUNTER
Please let her know I won't be able to call her as I am out of the office the rest of today. Her sodium level has been low and this is preventing her discharge at this time, not her blood sugars so until the hospital team feels this is stable, I don't recommend she be discharged.

## 2023-09-08 LAB
ALBUMIN SERPL-MCNC: 3 G/DL (ref 3.5–5)
ALBUMIN SERPL-MCNC: 3.3 G/DL (ref 3.5–5)
ALBUMIN/GLOB SERPL: 0.8 (ref 1.1–2.2)
ALP SERPL-CCNC: 92 U/L (ref 45–117)
ALT SERPL-CCNC: 28 U/L (ref 12–78)
ANION GAP SERPL CALC-SCNC: 4 MMOL/L (ref 5–15)
ANION GAP SERPL CALC-SCNC: 7 MMOL/L (ref 5–15)
AST SERPL-CCNC: 32 U/L (ref 15–37)
BILIRUB SERPL-MCNC: 0.4 MG/DL (ref 0.2–1)
BUN SERPL-MCNC: 27 MG/DL (ref 6–20)
BUN SERPL-MCNC: 28 MG/DL (ref 6–20)
BUN/CREAT SERPL: 23 (ref 12–20)
BUN/CREAT SERPL: 24 (ref 12–20)
CALCIUM SERPL-MCNC: 8.7 MG/DL (ref 8.5–10.1)
CALCIUM SERPL-MCNC: 8.8 MG/DL (ref 8.5–10.1)
CHLORIDE SERPL-SCNC: 83 MMOL/L (ref 97–108)
CHLORIDE SERPL-SCNC: 87 MMOL/L (ref 97–108)
CO2 SERPL-SCNC: 29 MMOL/L (ref 21–32)
CO2 SERPL-SCNC: 33 MMOL/L (ref 21–32)
COMMENT:: NORMAL
CREAT SERPL-MCNC: 1.13 MG/DL (ref 0.55–1.02)
CREAT SERPL-MCNC: 1.22 MG/DL (ref 0.55–1.02)
GLOBULIN SER CALC-MCNC: 3.7 G/DL (ref 2–4)
GLUCOSE BLD STRIP.AUTO-MCNC: 140 MG/DL (ref 65–117)
GLUCOSE BLD STRIP.AUTO-MCNC: 241 MG/DL (ref 65–117)
GLUCOSE BLD STRIP.AUTO-MCNC: 394 MG/DL (ref 65–117)
GLUCOSE SERPL-MCNC: 132 MG/DL (ref 65–100)
GLUCOSE SERPL-MCNC: 300 MG/DL (ref 65–100)
PHOSPHATE SERPL-MCNC: 5.1 MG/DL (ref 2.6–4.7)
POTASSIUM SERPL-SCNC: 4 MMOL/L (ref 3.5–5.1)
POTASSIUM SERPL-SCNC: 4 MMOL/L (ref 3.5–5.1)
POTASSIUM UR-SCNC: 20 MMOL/L
PROT SERPL-MCNC: 6.7 G/DL (ref 6.4–8.2)
SERVICE CMNT-IMP: ABNORMAL
SODIUM SERPL-SCNC: 120 MMOL/L (ref 136–145)
SODIUM SERPL-SCNC: 122 MMOL/L (ref 136–145)
SODIUM SERPL-SCNC: 123 MMOL/L (ref 136–145)
SODIUM SERPL-SCNC: 124 MMOL/L (ref 136–145)
SODIUM UR-SCNC: 31 MMOL/L
SPECIMEN HOLD: NORMAL

## 2023-09-08 PROCEDURE — 83935 ASSAY OF URINE OSMOLALITY: CPT

## 2023-09-08 PROCEDURE — 80053 COMPREHEN METABOLIC PANEL: CPT

## 2023-09-08 PROCEDURE — 2580000003 HC RX 258: Performed by: INTERNAL MEDICINE

## 2023-09-08 PROCEDURE — 82962 GLUCOSE BLOOD TEST: CPT

## 2023-09-08 PROCEDURE — 1100000003 HC PRIVATE W/ TELEMETRY

## 2023-09-08 PROCEDURE — 6370000000 HC RX 637 (ALT 250 FOR IP): Performed by: INTERNAL MEDICINE

## 2023-09-08 PROCEDURE — 84133 ASSAY OF URINE POTASSIUM: CPT

## 2023-09-08 PROCEDURE — 6360000002 HC RX W HCPCS: Performed by: INTERNAL MEDICINE

## 2023-09-08 PROCEDURE — 80069 RENAL FUNCTION PANEL: CPT

## 2023-09-08 PROCEDURE — 84295 ASSAY OF SERUM SODIUM: CPT

## 2023-09-08 PROCEDURE — 84300 ASSAY OF URINE SODIUM: CPT

## 2023-09-08 PROCEDURE — 36415 COLL VENOUS BLD VENIPUNCTURE: CPT

## 2023-09-08 RX ORDER — BUMETANIDE 0.25 MG/ML
2 INJECTION INTRAMUSCULAR; INTRAVENOUS 2 TIMES DAILY
Status: DISCONTINUED | OUTPATIENT
Start: 2023-09-09 | End: 2023-09-11

## 2023-09-08 RX ORDER — TOLVAPTAN 15 MG/1
15 TABLET ORAL ONCE
Status: COMPLETED | OUTPATIENT
Start: 2023-09-08 | End: 2023-09-08

## 2023-09-08 RX ORDER — INSULIN GLARGINE 100 [IU]/ML
44 INJECTION, SOLUTION SUBCUTANEOUS DAILY
Status: DISCONTINUED | OUTPATIENT
Start: 2023-09-09 | End: 2023-09-10

## 2023-09-08 RX ADMIN — PANTOPRAZOLE SODIUM 40 MG: 40 TABLET, DELAYED RELEASE ORAL at 08:06

## 2023-09-08 RX ADMIN — Medication: at 21:46

## 2023-09-08 RX ADMIN — GABAPENTIN 300 MG: 300 CAPSULE ORAL at 21:45

## 2023-09-08 RX ADMIN — Medication: at 08:15

## 2023-09-08 RX ADMIN — ARIPIPRAZOLE 30 MG: 5 TABLET ORAL at 08:16

## 2023-09-08 RX ADMIN — Medication 15 MG: at 12:26

## 2023-09-08 RX ADMIN — ALPRAZOLAM 2 MG: 0.5 TABLET ORAL at 05:47

## 2023-09-08 RX ADMIN — BUSPIRONE HYDROCHLORIDE 15 MG: 10 TABLET ORAL at 13:47

## 2023-09-08 RX ADMIN — GABAPENTIN 300 MG: 300 CAPSULE ORAL at 08:05

## 2023-09-08 RX ADMIN — Medication 6 UNITS: at 08:07

## 2023-09-08 RX ADMIN — ROSUVASTATIN CALCIUM 40 MG: 40 TABLET, FILM COATED ORAL at 08:06

## 2023-09-08 RX ADMIN — BUMETANIDE 3 MG: 1 TABLET ORAL at 08:05

## 2023-09-08 RX ADMIN — OXYCODONE HYDROCHLORIDE 5 MG: 5 TABLET ORAL at 13:50

## 2023-09-08 RX ADMIN — Medication 12 UNITS: at 17:03

## 2023-09-08 RX ADMIN — BUSPIRONE HYDROCHLORIDE 15 MG: 10 TABLET ORAL at 21:46

## 2023-09-08 RX ADMIN — MORPHINE SULFATE 105 MG: 30 TABLET, FILM COATED, EXTENDED RELEASE ORAL at 21:45

## 2023-09-08 RX ADMIN — Medication 12 UNITS: at 12:32

## 2023-09-08 RX ADMIN — Medication: at 08:14

## 2023-09-08 RX ADMIN — SODIUM CHLORIDE, PRESERVATIVE FREE 10 ML: 5 INJECTION INTRAVENOUS at 08:15

## 2023-09-08 RX ADMIN — INSULIN GLARGINE 42 UNITS: 100 INJECTION, SOLUTION SUBCUTANEOUS at 08:06

## 2023-09-08 RX ADMIN — MORPHINE SULFATE 105 MG: 30 TABLET, FILM COATED, EXTENDED RELEASE ORAL at 08:06

## 2023-09-08 RX ADMIN — ALPRAZOLAM 2 MG: 0.5 TABLET ORAL at 19:27

## 2023-09-08 RX ADMIN — Medication 12 UNITS: at 08:06

## 2023-09-08 RX ADMIN — GABAPENTIN 300 MG: 300 CAPSULE ORAL at 13:47

## 2023-09-08 RX ADMIN — BUSPIRONE HYDROCHLORIDE 15 MG: 10 TABLET ORAL at 08:06

## 2023-09-08 RX ADMIN — OXYCODONE HYDROCHLORIDE 5 MG: 5 TABLET ORAL at 18:24

## 2023-09-08 RX ADMIN — Medication 4 UNITS: at 12:27

## 2023-09-08 RX ADMIN — ASPIRIN 81 MG: 81 TABLET, COATED ORAL at 08:06

## 2023-09-08 RX ADMIN — BUTALBITAL, ACETAMINOPHEN, AND CAFFEINE 1 TABLET: 325; 50; 40 TABLET ORAL at 02:31

## 2023-09-08 RX ADMIN — ENOXAPARIN SODIUM 40 MG: 100 INJECTION SUBCUTANEOUS at 08:05

## 2023-09-08 ASSESSMENT — PAIN SCALES - GENERAL
PAINLEVEL_OUTOF10: 0
PAINLEVEL_OUTOF10: 5
PAINLEVEL_OUTOF10: 9
PAINLEVEL_OUTOF10: 9
PAINLEVEL_OUTOF10: 8
PAINLEVEL_OUTOF10: 0
PAINLEVEL_OUTOF10: 6

## 2023-09-08 ASSESSMENT — PAIN DESCRIPTION - DESCRIPTORS
DESCRIPTORS: ACHING
DESCRIPTORS: ACHING

## 2023-09-08 ASSESSMENT — PAIN DESCRIPTION - ONSET: ONSET: PROGRESSIVE

## 2023-09-08 ASSESSMENT — PAIN DESCRIPTION - ORIENTATION: ORIENTATION: ANTERIOR;POSTERIOR

## 2023-09-08 ASSESSMENT — PAIN DESCRIPTION - LOCATION
LOCATION: HEAD
LOCATION: GENERALIZED
LOCATION: HEAD

## 2023-09-08 NOTE — CONSULTS
[address] [place] [address] Drive         NAME:Anette Ward  DARRICK:[id number]   :1970       Patient seen  Detailed noted to follow  Give iv lasix    She may need samsca if na stops  Fluid restriction     SHE IS NON COMPLIANT TO DIET AND FLUID RESTRICTION-       Hypoglycemia [E16.2]  Hypoglycemia associated with diabetes ([address]) [E11.649]  Lower leg edema [R60.0]  Chronic venous stasis dermatitis of both lower extremities [I87.2]  Hyperglycemia [R73.9]     Gus Aviles MD  [facility]  [facility]  [address] [place] [address], [address] [place] [address]  Phone - [phone]         Fax - [phone] [facility] Office  [address], [address] [place] [address], [address] [place] [address]  Phone - [phone]        Fax - [phone]

## 2023-09-08 NOTE — CARE COORDINATION
Transition of Care Plan:    RUR: 32%   Prior Level of Functioning: independent   Disposition: home   Follow up appointments: PCP and specialist if indicated   DME needed: Pt is on 2L of O2 at baseline (apria) Pt owns a rolling walker   Transportation at discharge: Irish Dickson)   [phone]  IM/[facility] Medicare/ letter given:  to be provided   Caregiver Contact: Irish Dickson)   [phone]  Discharge Caregiver contacted prior to discharge? If Pt desires   Care Conference needed? No   Barriers to discharge: medical stability                    Per chart review, Pt to discharge home once medically stable Pt is on 2L of O2 (apria) . No anticipated CM needs. CM will continue to follow for discharge needs if indicated.        Jossy Mosquera, MICHAEL Sánchez

## 2023-09-09 LAB
BACTERIA SPEC CULT: NORMAL
BACTERIA SPEC CULT: NORMAL
GLUCOSE BLD STRIP.AUTO-MCNC: 215 MG/DL (ref 65–117)
GLUCOSE BLD STRIP.AUTO-MCNC: 287 MG/DL (ref 65–117)
GLUCOSE BLD STRIP.AUTO-MCNC: 298 MG/DL (ref 65–117)
GLUCOSE BLD STRIP.AUTO-MCNC: 356 MG/DL (ref 65–117)
OSMOLALITY UR: 171 MOSM/KG H2O
SERVICE CMNT-IMP: ABNORMAL
SERVICE CMNT-IMP: NORMAL
SERVICE CMNT-IMP: NORMAL
SODIUM SERPL-SCNC: 125 MMOL/L (ref 136–145)
SODIUM SERPL-SCNC: 128 MMOL/L (ref 136–145)
SODIUM SERPL-SCNC: 129 MMOL/L (ref 136–145)

## 2023-09-09 PROCEDURE — 1100000003 HC PRIVATE W/ TELEMETRY

## 2023-09-09 PROCEDURE — 84295 ASSAY OF SERUM SODIUM: CPT

## 2023-09-09 PROCEDURE — 6360000002 HC RX W HCPCS: Performed by: INTERNAL MEDICINE

## 2023-09-09 PROCEDURE — 6370000000 HC RX 637 (ALT 250 FOR IP): Performed by: INTERNAL MEDICINE

## 2023-09-09 PROCEDURE — 36415 COLL VENOUS BLD VENIPUNCTURE: CPT

## 2023-09-09 PROCEDURE — 2580000003 HC RX 258: Performed by: INTERNAL MEDICINE

## 2023-09-09 PROCEDURE — 2500000003 HC RX 250 WO HCPCS: Performed by: INTERNAL MEDICINE

## 2023-09-09 PROCEDURE — 82962 GLUCOSE BLOOD TEST: CPT

## 2023-09-09 RX ORDER — TOLVAPTAN 15 MG/1
15 TABLET ORAL ONCE
Status: COMPLETED | OUTPATIENT
Start: 2023-09-09 | End: 2023-09-09

## 2023-09-09 RX ADMIN — MORPHINE SULFATE 105 MG: 30 TABLET, FILM COATED, EXTENDED RELEASE ORAL at 21:14

## 2023-09-09 RX ADMIN — MORPHINE SULFATE 105 MG: 30 TABLET, FILM COATED, EXTENDED RELEASE ORAL at 08:24

## 2023-09-09 RX ADMIN — Medication 12 UNITS: at 12:11

## 2023-09-09 RX ADMIN — ALPRAZOLAM 2 MG: 0.5 TABLET ORAL at 10:06

## 2023-09-09 RX ADMIN — SODIUM CHLORIDE, PRESERVATIVE FREE 10 ML: 5 INJECTION INTRAVENOUS at 08:26

## 2023-09-09 RX ADMIN — Medication: at 20:59

## 2023-09-09 RX ADMIN — Medication 12 UNITS: at 08:22

## 2023-09-09 RX ADMIN — Medication: at 08:34

## 2023-09-09 RX ADMIN — Medication: at 21:00

## 2023-09-09 RX ADMIN — ENOXAPARIN SODIUM 40 MG: 100 INJECTION SUBCUTANEOUS at 08:22

## 2023-09-09 RX ADMIN — Medication 12 UNITS: at 17:16

## 2023-09-09 RX ADMIN — GABAPENTIN 300 MG: 300 CAPSULE ORAL at 08:24

## 2023-09-09 RX ADMIN — SODIUM CHLORIDE, PRESERVATIVE FREE 10 ML: 5 INJECTION INTRAVENOUS at 21:16

## 2023-09-09 RX ADMIN — Medication: at 08:38

## 2023-09-09 RX ADMIN — Medication 15 MG: at 10:06

## 2023-09-09 RX ADMIN — ALPRAZOLAM 2 MG: 0.5 TABLET ORAL at 19:42

## 2023-09-09 RX ADMIN — Medication 6 UNITS: at 12:11

## 2023-09-09 RX ADMIN — ROSUVASTATIN CALCIUM 40 MG: 40 TABLET, FILM COATED ORAL at 08:24

## 2023-09-09 RX ADMIN — OXYCODONE HYDROCHLORIDE 5 MG: 5 TABLET ORAL at 16:09

## 2023-09-09 RX ADMIN — INSULIN GLARGINE 44 UNITS: 100 INJECTION, SOLUTION SUBCUTANEOUS at 08:22

## 2023-09-09 RX ADMIN — GABAPENTIN 300 MG: 300 CAPSULE ORAL at 15:28

## 2023-09-09 RX ADMIN — BUSPIRONE HYDROCHLORIDE 15 MG: 10 TABLET ORAL at 15:28

## 2023-09-09 RX ADMIN — BUSPIRONE HYDROCHLORIDE 15 MG: 10 TABLET ORAL at 21:13

## 2023-09-09 RX ADMIN — PANTOPRAZOLE SODIUM 40 MG: 40 TABLET, DELAYED RELEASE ORAL at 08:24

## 2023-09-09 RX ADMIN — BUSPIRONE HYDROCHLORIDE 15 MG: 10 TABLET ORAL at 08:24

## 2023-09-09 RX ADMIN — Medication 2 UNITS: at 08:23

## 2023-09-09 RX ADMIN — GABAPENTIN 300 MG: 300 CAPSULE ORAL at 21:13

## 2023-09-09 RX ADMIN — Medication 4 UNITS: at 17:15

## 2023-09-09 RX ADMIN — ASPIRIN 81 MG: 81 TABLET, COATED ORAL at 08:23

## 2023-09-09 RX ADMIN — ARIPIPRAZOLE 30 MG: 5 TABLET ORAL at 08:23

## 2023-09-09 RX ADMIN — BUTALBITAL, ACETAMINOPHEN, AND CAFFEINE 1 TABLET: 325; 50; 40 TABLET ORAL at 03:19

## 2023-09-09 ASSESSMENT — PAIN DESCRIPTION - PAIN TYPE
TYPE: CHRONIC PAIN
TYPE: CHRONIC PAIN

## 2023-09-09 ASSESSMENT — PAIN DESCRIPTION - ORIENTATION
ORIENTATION: LEFT;RIGHT
ORIENTATION: LEFT;RIGHT

## 2023-09-09 ASSESSMENT — PAIN DESCRIPTION - ONSET
ONSET: ON-GOING
ONSET: ON-GOING

## 2023-09-09 ASSESSMENT — PAIN DESCRIPTION - FREQUENCY
FREQUENCY: CONTINUOUS
FREQUENCY: CONTINUOUS

## 2023-09-09 ASSESSMENT — PAIN DESCRIPTION - LOCATION
LOCATION: LEG
LOCATION: LEG

## 2023-09-09 ASSESSMENT — PAIN SCALES - GENERAL
PAINLEVEL_OUTOF10: 8
PAINLEVEL_OUTOF10: 5
PAINLEVEL_OUTOF10: 0
PAINLEVEL_OUTOF10: 8
PAINLEVEL_OUTOF10: 0
PAINLEVEL_OUTOF10: 0

## 2023-09-09 ASSESSMENT — PAIN DESCRIPTION - DESCRIPTORS
DESCRIPTORS: ACHING
DESCRIPTORS: ACHING

## 2023-09-10 LAB
ALBUMIN SERPL-MCNC: 3.1 G/DL (ref 3.5–5)
ANION GAP SERPL CALC-SCNC: 7 MMOL/L (ref 5–15)
BUN SERPL-MCNC: 27 MG/DL (ref 6–20)
BUN/CREAT SERPL: 21 (ref 12–20)
CALCIUM SERPL-MCNC: 8.6 MG/DL (ref 8.5–10.1)
CHLORIDE SERPL-SCNC: 92 MMOL/L (ref 97–108)
CO2 SERPL-SCNC: 27 MMOL/L (ref 21–32)
CREAT SERPL-MCNC: 1.28 MG/DL (ref 0.55–1.02)
GLUCOSE BLD STRIP.AUTO-MCNC: 240 MG/DL (ref 65–117)
GLUCOSE BLD STRIP.AUTO-MCNC: 306 MG/DL (ref 65–117)
GLUCOSE BLD STRIP.AUTO-MCNC: 419 MG/DL (ref 65–117)
GLUCOSE BLD STRIP.AUTO-MCNC: 419 MG/DL (ref 65–117)
GLUCOSE BLD STRIP.AUTO-MCNC: 538 MG/DL (ref 65–117)
GLUCOSE BLD STRIP.AUTO-MCNC: 564 MG/DL (ref 65–117)
GLUCOSE BLD STRIP.AUTO-MCNC: 570 MG/DL (ref 65–117)
GLUCOSE BLD STRIP.AUTO-MCNC: 572 MG/DL (ref 65–117)
GLUCOSE BLD STRIP.AUTO-MCNC: 588 MG/DL (ref 65–117)
GLUCOSE SERPL-MCNC: 437 MG/DL (ref 65–100)
PHOSPHATE SERPL-MCNC: 4.9 MG/DL (ref 2.6–4.7)
POTASSIUM SERPL-SCNC: 4.9 MMOL/L (ref 3.5–5.1)
SERVICE CMNT-IMP: ABNORMAL
SODIUM SERPL-SCNC: 126 MMOL/L (ref 136–145)
SODIUM SERPL-SCNC: 126 MMOL/L (ref 136–145)

## 2023-09-10 PROCEDURE — 82962 GLUCOSE BLOOD TEST: CPT

## 2023-09-10 PROCEDURE — 6370000000 HC RX 637 (ALT 250 FOR IP): Performed by: INTERNAL MEDICINE

## 2023-09-10 PROCEDURE — 80069 RENAL FUNCTION PANEL: CPT

## 2023-09-10 PROCEDURE — 1100000003 HC PRIVATE W/ TELEMETRY

## 2023-09-10 PROCEDURE — 2580000003 HC RX 258: Performed by: INTERNAL MEDICINE

## 2023-09-10 PROCEDURE — 6360000002 HC RX W HCPCS: Performed by: INTERNAL MEDICINE

## 2023-09-10 PROCEDURE — 2500000003 HC RX 250 WO HCPCS: Performed by: INTERNAL MEDICINE

## 2023-09-10 PROCEDURE — 84295 ASSAY OF SERUM SODIUM: CPT

## 2023-09-10 PROCEDURE — 36415 COLL VENOUS BLD VENIPUNCTURE: CPT

## 2023-09-10 RX ORDER — INSULIN LISPRO 100 [IU]/ML
14 INJECTION, SOLUTION INTRAVENOUS; SUBCUTANEOUS
Status: DISCONTINUED | OUTPATIENT
Start: 2023-09-11 | End: 2023-09-12 | Stop reason: HOSPADM

## 2023-09-10 RX ORDER — INSULIN GLARGINE 100 [IU]/ML
4 INJECTION, SOLUTION SUBCUTANEOUS ONCE
Status: COMPLETED | OUTPATIENT
Start: 2023-09-10 | End: 2023-09-10

## 2023-09-10 RX ORDER — INSULIN LISPRO 100 [IU]/ML
0-12 INJECTION, SOLUTION INTRAVENOUS; SUBCUTANEOUS
Status: DISCONTINUED | OUTPATIENT
Start: 2023-09-10 | End: 2023-09-12 | Stop reason: HOSPADM

## 2023-09-10 RX ORDER — INSULIN GLARGINE 100 [IU]/ML
48 INJECTION, SOLUTION SUBCUTANEOUS DAILY
Status: DISCONTINUED | OUTPATIENT
Start: 2023-09-11 | End: 2023-09-12 | Stop reason: HOSPADM

## 2023-09-10 RX ADMIN — GABAPENTIN 300 MG: 300 CAPSULE ORAL at 09:03

## 2023-09-10 RX ADMIN — BUSPIRONE HYDROCHLORIDE 15 MG: 10 TABLET ORAL at 09:03

## 2023-09-10 RX ADMIN — BUSPIRONE HYDROCHLORIDE 15 MG: 10 TABLET ORAL at 20:33

## 2023-09-10 RX ADMIN — SODIUM CHLORIDE, PRESERVATIVE FREE 10 ML: 5 INJECTION INTRAVENOUS at 09:06

## 2023-09-10 RX ADMIN — Medication: at 20:34

## 2023-09-10 RX ADMIN — ALPRAZOLAM 2 MG: 0.5 TABLET ORAL at 03:42

## 2023-09-10 RX ADMIN — Medication: at 10:01

## 2023-09-10 RX ADMIN — Medication 6 UNITS: at 12:25

## 2023-09-10 RX ADMIN — GABAPENTIN 300 MG: 300 CAPSULE ORAL at 20:33

## 2023-09-10 RX ADMIN — BUSPIRONE HYDROCHLORIDE 15 MG: 10 TABLET ORAL at 13:42

## 2023-09-10 RX ADMIN — Medication 4 UNITS: at 17:24

## 2023-09-10 RX ADMIN — Medication 12 UNITS: at 09:07

## 2023-09-10 RX ADMIN — ALPRAZOLAM 2 MG: 0.5 TABLET ORAL at 13:42

## 2023-09-10 RX ADMIN — MORPHINE SULFATE 105 MG: 30 TABLET, FILM COATED, EXTENDED RELEASE ORAL at 09:03

## 2023-09-10 RX ADMIN — MORPHINE SULFATE 105 MG: 30 TABLET, FILM COATED, EXTENDED RELEASE ORAL at 20:33

## 2023-09-10 RX ADMIN — GABAPENTIN 300 MG: 300 CAPSULE ORAL at 13:42

## 2023-09-10 RX ADMIN — Medication 12 UNITS: at 12:25

## 2023-09-10 RX ADMIN — Medication 10 UNITS: at 12:25

## 2023-09-10 RX ADMIN — INSULIN GLARGINE 4 UNITS: 100 INJECTION, SOLUTION SUBCUTANEOUS at 20:49

## 2023-09-10 RX ADMIN — ROSUVASTATIN CALCIUM 40 MG: 40 TABLET, FILM COATED ORAL at 09:04

## 2023-09-10 RX ADMIN — Medication 6 UNITS: at 09:07

## 2023-09-10 RX ADMIN — Medication: at 09:09

## 2023-09-10 RX ADMIN — ENOXAPARIN SODIUM 40 MG: 100 INJECTION SUBCUTANEOUS at 09:08

## 2023-09-10 RX ADMIN — SODIUM CHLORIDE, PRESERVATIVE FREE 10 ML: 5 INJECTION INTRAVENOUS at 20:34

## 2023-09-10 RX ADMIN — BUMETANIDE 2 MG: 0.25 INJECTION INTRAMUSCULAR; INTRAVENOUS at 20:36

## 2023-09-10 RX ADMIN — INSULIN GLARGINE 44 UNITS: 100 INJECTION, SOLUTION SUBCUTANEOUS at 09:08

## 2023-09-10 RX ADMIN — Medication 2 UNITS: at 20:50

## 2023-09-10 RX ADMIN — Medication 12 UNITS: at 17:23

## 2023-09-10 RX ADMIN — BUTALBITAL, ACETAMINOPHEN, AND CAFFEINE 1 TABLET: 325; 50; 40 TABLET ORAL at 02:48

## 2023-09-10 RX ADMIN — ARIPIPRAZOLE 30 MG: 5 TABLET ORAL at 09:59

## 2023-09-10 RX ADMIN — ASPIRIN 81 MG: 81 TABLET, COATED ORAL at 09:03

## 2023-09-10 RX ADMIN — BUMETANIDE 2 MG: 0.25 INJECTION INTRAMUSCULAR; INTRAVENOUS at 09:58

## 2023-09-10 RX ADMIN — PANTOPRAZOLE SODIUM 40 MG: 40 TABLET, DELAYED RELEASE ORAL at 09:04

## 2023-09-10 RX ADMIN — ALPRAZOLAM 2 MG: 0.5 TABLET ORAL at 22:13

## 2023-09-10 ASSESSMENT — PAIN SCALES - GENERAL
PAINLEVEL_OUTOF10: 8
PAINLEVEL_OUTOF10: 0
PAINLEVEL_OUTOF10: 0
PAINLEVEL_OUTOF10: 2
PAINLEVEL_OUTOF10: 8
PAINLEVEL_OUTOF10: 6
PAINLEVEL_OUTOF10: 8
PAINLEVEL_OUTOF10: 0

## 2023-09-10 ASSESSMENT — PAIN DESCRIPTION - DESCRIPTORS
DESCRIPTORS: ACHING

## 2023-09-10 ASSESSMENT — PAIN DESCRIPTION - ORIENTATION
ORIENTATION: MID;ANTERIOR
ORIENTATION: ANTERIOR
ORIENTATION: ANTERIOR

## 2023-09-10 ASSESSMENT — PAIN DESCRIPTION - LOCATION
LOCATION: HEAD
LOCATION: HEAD
LOCATION: ABDOMEN

## 2023-09-11 LAB
ALBUMIN SERPL-MCNC: 3.3 G/DL (ref 3.5–5)
ANION GAP SERPL CALC-SCNC: 4 MMOL/L (ref 5–15)
BUN SERPL-MCNC: 31 MG/DL (ref 6–20)
BUN/CREAT SERPL: 26 (ref 12–20)
CALCIUM SERPL-MCNC: 8.9 MG/DL (ref 8.5–10.1)
CHLORIDE SERPL-SCNC: 94 MMOL/L (ref 97–108)
CO2 SERPL-SCNC: 31 MMOL/L (ref 21–32)
CREAT SERPL-MCNC: 1.2 MG/DL (ref 0.55–1.02)
GLUCOSE BLD STRIP.AUTO-MCNC: 155 MG/DL (ref 65–117)
GLUCOSE BLD STRIP.AUTO-MCNC: 189 MG/DL (ref 65–117)
GLUCOSE BLD STRIP.AUTO-MCNC: 291 MG/DL (ref 65–117)
GLUCOSE BLD STRIP.AUTO-MCNC: 313 MG/DL (ref 65–117)
GLUCOSE SERPL-MCNC: 197 MG/DL (ref 65–100)
PHOSPHATE SERPL-MCNC: 5.3 MG/DL (ref 2.6–4.7)
POTASSIUM SERPL-SCNC: 4.2 MMOL/L (ref 3.5–5.1)
SERVICE CMNT-IMP: ABNORMAL
SODIUM SERPL-SCNC: 129 MMOL/L (ref 136–145)

## 2023-09-11 PROCEDURE — 6370000000 HC RX 637 (ALT 250 FOR IP): Performed by: INTERNAL MEDICINE

## 2023-09-11 PROCEDURE — 82962 GLUCOSE BLOOD TEST: CPT

## 2023-09-11 PROCEDURE — 2580000003 HC RX 258: Performed by: INTERNAL MEDICINE

## 2023-09-11 PROCEDURE — 36415 COLL VENOUS BLD VENIPUNCTURE: CPT

## 2023-09-11 PROCEDURE — 1100000003 HC PRIVATE W/ TELEMETRY

## 2023-09-11 PROCEDURE — 80069 RENAL FUNCTION PANEL: CPT

## 2023-09-11 PROCEDURE — 6360000002 HC RX W HCPCS: Performed by: INTERNAL MEDICINE

## 2023-09-11 RX ORDER — BUMETANIDE 1 MG/1
2 TABLET ORAL 2 TIMES DAILY
Status: DISCONTINUED | OUTPATIENT
Start: 2023-09-11 | End: 2023-09-12

## 2023-09-11 RX ADMIN — Medication 2 UNITS: at 17:51

## 2023-09-11 RX ADMIN — Medication 14 UNITS: at 17:51

## 2023-09-11 RX ADMIN — ARIPIPRAZOLE 30 MG: 5 TABLET ORAL at 08:28

## 2023-09-11 RX ADMIN — Medication 14 UNITS: at 08:35

## 2023-09-11 RX ADMIN — Medication: at 20:14

## 2023-09-11 RX ADMIN — Medication: at 08:35

## 2023-09-11 RX ADMIN — Medication 6 UNITS: at 08:36

## 2023-09-11 RX ADMIN — OXYCODONE HYDROCHLORIDE 5 MG: 5 TABLET ORAL at 12:28

## 2023-09-11 RX ADMIN — BUMETANIDE 2 MG: 1 TABLET ORAL at 20:12

## 2023-09-11 RX ADMIN — GABAPENTIN 300 MG: 300 CAPSULE ORAL at 20:12

## 2023-09-11 RX ADMIN — INSULIN GLARGINE 48 UNITS: 100 INJECTION, SOLUTION SUBCUTANEOUS at 08:45

## 2023-09-11 RX ADMIN — GABAPENTIN 300 MG: 300 CAPSULE ORAL at 08:30

## 2023-09-11 RX ADMIN — ROSUVASTATIN CALCIUM 40 MG: 40 TABLET, FILM COATED ORAL at 08:27

## 2023-09-11 RX ADMIN — MORPHINE SULFATE 105 MG: 30 TABLET, FILM COATED, EXTENDED RELEASE ORAL at 08:28

## 2023-09-11 RX ADMIN — ALPRAZOLAM 2 MG: 0.5 TABLET ORAL at 10:27

## 2023-09-11 RX ADMIN — BUSPIRONE HYDROCHLORIDE 15 MG: 10 TABLET ORAL at 14:47

## 2023-09-11 RX ADMIN — BUSPIRONE HYDROCHLORIDE 15 MG: 10 TABLET ORAL at 08:28

## 2023-09-11 RX ADMIN — GABAPENTIN 300 MG: 300 CAPSULE ORAL at 14:47

## 2023-09-11 RX ADMIN — MORPHINE SULFATE 105 MG: 30 TABLET, FILM COATED, EXTENDED RELEASE ORAL at 20:12

## 2023-09-11 RX ADMIN — Medication 8 UNITS: at 12:33

## 2023-09-11 RX ADMIN — SODIUM CHLORIDE, PRESERVATIVE FREE 10 ML: 5 INJECTION INTRAVENOUS at 08:37

## 2023-09-11 RX ADMIN — ASPIRIN 81 MG: 81 TABLET, COATED ORAL at 08:28

## 2023-09-11 RX ADMIN — ENOXAPARIN SODIUM 40 MG: 100 INJECTION SUBCUTANEOUS at 08:35

## 2023-09-11 RX ADMIN — Medication: at 08:34

## 2023-09-11 RX ADMIN — ALPRAZOLAM 2 MG: 0.5 TABLET ORAL at 21:20

## 2023-09-11 RX ADMIN — PANTOPRAZOLE SODIUM 40 MG: 40 TABLET, DELAYED RELEASE ORAL at 08:27

## 2023-09-11 RX ADMIN — Medication: at 20:15

## 2023-09-11 RX ADMIN — BUSPIRONE HYDROCHLORIDE 15 MG: 10 TABLET ORAL at 20:11

## 2023-09-11 RX ADMIN — OXYCODONE HYDROCHLORIDE 5 MG: 5 TABLET ORAL at 17:50

## 2023-09-11 RX ADMIN — BUMETANIDE 2 MG: 1 TABLET ORAL at 08:45

## 2023-09-11 RX ADMIN — Medication 14 UNITS: at 12:32

## 2023-09-11 RX ADMIN — SODIUM CHLORIDE, PRESERVATIVE FREE 10 ML: 5 INJECTION INTRAVENOUS at 20:13

## 2023-09-11 ASSESSMENT — PAIN DESCRIPTION - FREQUENCY
FREQUENCY: CONTINUOUS
FREQUENCY: CONTINUOUS

## 2023-09-11 ASSESSMENT — PAIN DESCRIPTION - PAIN TYPE
TYPE: CHRONIC PAIN
TYPE: CHRONIC PAIN

## 2023-09-11 ASSESSMENT — PAIN SCALES - GENERAL
PAINLEVEL_OUTOF10: 5
PAINLEVEL_OUTOF10: 9
PAINLEVEL_OUTOF10: 5
PAINLEVEL_OUTOF10: 5
PAINLEVEL_OUTOF10: 6
PAINLEVEL_OUTOF10: 0
PAINLEVEL_OUTOF10: 0
PAINLEVEL_OUTOF10: 8
PAINLEVEL_OUTOF10: 5
PAINLEVEL_OUTOF10: 8

## 2023-09-11 ASSESSMENT — PAIN DESCRIPTION - DESCRIPTORS
DESCRIPTORS: ACHING;CRAMPING
DESCRIPTORS: ACHING
DESCRIPTORS: ACHING;CRAMPING
DESCRIPTORS: ACHING

## 2023-09-11 ASSESSMENT — PAIN DESCRIPTION - LOCATION
LOCATION: GROIN
LOCATION: ABDOMEN
LOCATION: PELVIS;GROIN
LOCATION: ABDOMEN
LOCATION: ABDOMEN

## 2023-09-11 ASSESSMENT — PAIN DESCRIPTION - ORIENTATION
ORIENTATION: ANTERIOR
ORIENTATION: ANTERIOR

## 2023-09-12 ENCOUNTER — TELEPHONE (OUTPATIENT)
Age: 53
End: 2023-09-12

## 2023-09-12 VITALS
HEART RATE: 79 BPM | BODY MASS INDEX: 32.36 KG/M2 | HEIGHT: 65 IN | SYSTOLIC BLOOD PRESSURE: 139 MMHG | DIASTOLIC BLOOD PRESSURE: 63 MMHG | RESPIRATION RATE: 18 BRPM | WEIGHT: 194.22 LBS | TEMPERATURE: 97.8 F | OXYGEN SATURATION: 94 %

## 2023-09-12 LAB
ALBUMIN SERPL-MCNC: 3.3 G/DL (ref 3.5–5)
ANION GAP SERPL CALC-SCNC: 5 MMOL/L (ref 5–15)
BUN SERPL-MCNC: 27 MG/DL (ref 6–20)
BUN/CREAT SERPL: 20 (ref 12–20)
CALCIUM SERPL-MCNC: 8.9 MG/DL (ref 8.5–10.1)
CHLORIDE SERPL-SCNC: 94 MMOL/L (ref 97–108)
CO2 SERPL-SCNC: 30 MMOL/L (ref 21–32)
CREAT SERPL-MCNC: 1.35 MG/DL (ref 0.55–1.02)
GLUCOSE BLD STRIP.AUTO-MCNC: 251 MG/DL (ref 65–117)
GLUCOSE BLD STRIP.AUTO-MCNC: 320 MG/DL (ref 65–117)
GLUCOSE SERPL-MCNC: 239 MG/DL (ref 65–100)
PHOSPHATE SERPL-MCNC: 5.9 MG/DL (ref 2.6–4.7)
POTASSIUM SERPL-SCNC: 4.3 MMOL/L (ref 3.5–5.1)
SERVICE CMNT-IMP: ABNORMAL
SERVICE CMNT-IMP: ABNORMAL
SODIUM SERPL-SCNC: 129 MMOL/L (ref 136–145)

## 2023-09-12 PROCEDURE — 6370000000 HC RX 637 (ALT 250 FOR IP): Performed by: INTERNAL MEDICINE

## 2023-09-12 PROCEDURE — 6360000002 HC RX W HCPCS: Performed by: INTERNAL MEDICINE

## 2023-09-12 PROCEDURE — 36415 COLL VENOUS BLD VENIPUNCTURE: CPT

## 2023-09-12 PROCEDURE — 80069 RENAL FUNCTION PANEL: CPT

## 2023-09-12 PROCEDURE — 82962 GLUCOSE BLOOD TEST: CPT

## 2023-09-12 RX ORDER — INSULIN GLARGINE 100 [IU]/ML
INJECTION, SOLUTION SUBCUTANEOUS
Qty: 20 ML | Refills: 11
Start: 2023-09-12

## 2023-09-12 RX ORDER — INSULIN GLARGINE 100 [IU]/ML
INJECTION, SOLUTION SUBCUTANEOUS
Qty: 20 ML | Refills: 11 | Status: SHIPPED
Start: 2023-09-12 | End: 2023-09-12

## 2023-09-12 RX ORDER — INSULIN LISPRO 100 [IU]/ML
INJECTION, SOLUTION INTRAVENOUS; SUBCUTANEOUS
Qty: 20 ML | Refills: 11 | Status: SHIPPED
Start: 2023-09-12

## 2023-09-12 RX ORDER — BUMETANIDE 1 MG/1
1.5 TABLET ORAL 2 TIMES DAILY
Status: DISCONTINUED | OUTPATIENT
Start: 2023-09-12 | End: 2023-09-12 | Stop reason: HOSPADM

## 2023-09-12 RX ADMIN — MORPHINE SULFATE 105 MG: 30 TABLET, FILM COATED, EXTENDED RELEASE ORAL at 08:21

## 2023-09-12 RX ADMIN — ALPRAZOLAM 2 MG: 0.5 TABLET ORAL at 05:28

## 2023-09-12 RX ADMIN — ASPIRIN 81 MG: 81 TABLET, COATED ORAL at 08:21

## 2023-09-12 RX ADMIN — GABAPENTIN 300 MG: 300 CAPSULE ORAL at 08:21

## 2023-09-12 RX ADMIN — PANTOPRAZOLE SODIUM 40 MG: 40 TABLET, DELAYED RELEASE ORAL at 08:21

## 2023-09-12 RX ADMIN — ENOXAPARIN SODIUM 40 MG: 100 INJECTION SUBCUTANEOUS at 08:21

## 2023-09-12 RX ADMIN — BUSPIRONE HYDROCHLORIDE 15 MG: 10 TABLET ORAL at 08:22

## 2023-09-12 RX ADMIN — Medication: at 08:23

## 2023-09-12 RX ADMIN — ROSUVASTATIN CALCIUM 40 MG: 40 TABLET, FILM COATED ORAL at 08:21

## 2023-09-12 RX ADMIN — INSULIN GLARGINE 48 UNITS: 100 INJECTION, SOLUTION SUBCUTANEOUS at 08:22

## 2023-09-12 RX ADMIN — Medication: at 08:24

## 2023-09-12 RX ADMIN — Medication 8 UNITS: at 08:23

## 2023-09-12 RX ADMIN — BUMETANIDE 2 MG: 1 TABLET ORAL at 08:21

## 2023-09-12 RX ADMIN — ARIPIPRAZOLE 30 MG: 5 TABLET ORAL at 08:33

## 2023-09-12 RX ADMIN — Medication 14 UNITS: at 08:22

## 2023-09-12 NOTE — TELEPHONE ENCOUNTER
Ask her when she is being discharged as I can try to stop by her room at lunch around 1pm today to talk with her but if leaving sooner, then I can call her tonight to discuss in more detail.

## 2023-09-12 NOTE — DISCHARGE SUMMARY
Discharge Summary    Name: Deisy Olsen  [id number]  YOB: 1970 (Age: 47 y.o.)   Date of Admission: 9/1/2023  Date of Discharge: 9/12/2023  Attending Physician: Leticia Moreau MD    Discharge Diagnosis:   Refractory hypoglycemia  Uncontrolled Type I DM  Acute hyponatremia  Hypertension  Hyperlipidemia  Morbidly obese  Chronic lower extremity stasis dermatitis  Psychiatry disturbance  Nicotine dependence  Chronic pain syndrome due to endometriosis  Chronic opioid use  Consultations:  IP CONSULT TO HOSPITALIST  IP CONSULT TO ENDOCRINOLOGY  IP WOUND CARE NURSE CONSULT TO EVAL  IP WOUND CARE NURSE CONSULT TO EVAL  IP CONSULT TO NEPHROLOGY      Brief Admission History/Reason for Admission Per Debbie Powell MD:     Alivia Noel is a 47 y.o.  female with PMHx as listed below returning to emergency department with complaint of headache and cramps consistent with prior episodes of hyponatremia. Patient is poor historian and appears over sedated/somnolent. Patient with recent hospitalization from 7/27-7/31 for hypervolemic low solute hyponatremia, hypotension, diastolic heart failure. Unable to obtain reliable ROS given somnolence. In the ED, patient afebrile, hemodynamically stable, saturating mid 90s on room air. ECG demonstrates sinus tachycardia without definitive ischemic change. CT head negative for acute process. Labs demonstrate: Hypoglycemia 45 status post hypoglycemia protocol with improvement to 102, sodium 125 (129 at time of discharge), chloride 88, BUN 31, creatinine 1.47 (baseline 1.0-1.2), UA not reflex to culture, WBC 8.4, hemoglobin 8.1 (chronic)-MCV 68.4, platelets 495     Brief Hospital Course by Main Problems:   Patient received D10 drip with improvement in blood sugar. Blood sugar then started trending up. Endocrine consulted and insulin titrated up. Nephrology consulted for hyponatremia. Blood sugar still elevated.  Patient states that

## 2023-09-12 NOTE — TELEPHONE ENCOUNTER
Pt LVM stating she will be discharged today and would like to be seen by Dr Saravanan Jo this week if possible to discuss blood sugars.

## 2023-09-12 NOTE — TELEPHONE ENCOUNTER
Called and spoke with pt. She was discharged home today and her sugar was 187 tonight while talking to me and she was about to eat dinner. I asked her why her sugar nadiya from 189 at 8:30pm last night to 320 this morning and if she had eaten or drank anything with sugar and she had not and states her endometriosis pain was worse and this was likely driving up her sugar. I advised her to take a dose of 4 units of lantus tonight and then starting tomorrow take 52 units of lantus every morning. I asked her if she had the scale I gave her for humalog from her last office visit and she does have this and advised her to take 2 more units for each part of the scale (example: : instead of 12, take 14). She will call us tomorrow with any update on her sugars. I advised that Dr. Jaylene Brown is on call for me this week if she needs anything tonight. she voiced understanding of this plan.

## 2023-09-12 NOTE — TELEPHONE ENCOUNTER
Patient notified of message per Dr. Marcellus Madison and voiced understanding of what was read to them. Pt states she would rather Dr Marcellus Madison call her later tonight to discuss as they just called a ride for her to leave.

## 2023-09-12 NOTE — PLAN OF CARE
Problem: Discharge Planning  Goal: Discharge to home or other facility with appropriate resources  9/12/2023 1133 by Nga Ayoub RN  Outcome: Adequate for Discharge  9/12/2023 1054 by Nga Ayoub RN  Outcome: Progressing     Problem: Safety - Adult  Goal: Free from fall injury  9/12/2023 1133 by Nga Ayoub RN  Outcome: Adequate for Discharge  9/12/2023 1054 by Nga Ayoub RN  Outcome: Progressing     Problem: Pain  Goal: Verbalizes/displays adequate comfort level or baseline comfort level  9/12/2023 1133 by Nga Ayoub RN  Outcome: Adequate for Discharge  9/12/2023 1054 by Nga Ayoub RN  Outcome: Progressing     Problem: Chronic Conditions and Co-morbidities  Goal: Patient's chronic conditions and co-morbidity symptoms are monitored and maintained or improved  9/12/2023 1133 by Nga Ayoub RN  Outcome: Adequate for Discharge  9/12/2023 1054 by Nga Ayoub RN  Outcome: Progressing     Problem: Skin/Tissue Integrity  Goal: Absence of new skin breakdown  Description: 1. Monitor for areas of redness and/or skin breakdown  2. Assess vascular access sites hourly  3. Every 4-6 hours minimum:  Change oxygen saturation probe site  4. Every 4-6 hours:  If on nasal continuous positive airway pressure, respiratory therapy assess nares and determine need for appliance change or resting period.   9/12/2023 1133 by Nga Ayoub RN  Outcome: Adequate for Discharge  9/12/2023 1054 by Nga Ayoub RN  Outcome: Progressing
Problem: Discharge Planning  Goal: Discharge to home or other facility with appropriate resources  9/9/2023 1436 by Chevy Altman RN  Outcome: Progressing     Problem: Safety - Adult  Goal: Free from fall injury  9/9/2023 2201 by Aaliyah Benedict RN  Outcome: Progressing  9/9/2023 1436 by Chevy Altman RN  Outcome: Progressing     Problem: Pain  Goal: Verbalizes/displays adequate comfort level or baseline comfort level  9/9/2023 1436 by Chevy Altman RN  Outcome: Progressing     Problem: Chronic Conditions and Co-morbidities  Goal: Patient's chronic conditions and co-morbidity symptoms are monitored and maintained or improved  9/9/2023 1436 by Chevy Altman RN  Outcome: Progressing     Problem: Skin/Tissue Integrity  Goal: Absence of new skin breakdown  Description: 1. Monitor for areas of redness and/or skin breakdown  2. Assess vascular access sites hourly  3. Every 4-6 hours minimum:  Change oxygen saturation probe site  4. Every 4-6 hours:  If on nasal continuous positive airway pressure, respiratory therapy assess nares and determine need for appliance change or resting period.   9/9/2023 1436 by Chevy Altman RN  Outcome: Progressing
Problem: Discharge Planning  Goal: Discharge to home or other facility with appropriate resources  Outcome: Progressing     Problem: Safety - Adult  Goal: Free from fall injury  Outcome: Progressing
Problem: Discharge Planning  Goal: Discharge to home or other facility with appropriate resources  Outcome: Progressing     Problem: Safety - Adult  Goal: Free from fall injury  Outcome: Progressing     Problem: Pain  Goal: Verbalizes/displays adequate comfort level or baseline comfort level  Outcome: Progressing     Problem: Chronic Conditions and Co-morbidities  Goal: Patient's chronic conditions and co-morbidity symptoms are monitored and maintained or improved  Outcome: Progressing     Problem: Skin/Tissue Integrity  Goal: Absence of new skin breakdown  Description: 1. Monitor for areas of redness and/or skin breakdown  2. Assess vascular access sites hourly  3. Every 4-6 hours minimum:  Change oxygen saturation probe site  4. Every 4-6 hours:  If on nasal continuous positive airway pressure, respiratory therapy assess nares and determine need for appliance change or resting period.   Outcome: Progressing
Problem: Discharge Planning  Goal: Discharge to home or other facility with appropriate resources  Outcome: Progressing     Problem: Safety - Adult  Goal: Free from fall injury  Outcome: Progressing     Problem: Pain  Goal: Verbalizes/displays adequate comfort level or baseline comfort level  Outcome: Progressing     Problem: Chronic Conditions and Co-morbidities  Goal: Patient's chronic conditions and co-morbidity symptoms are monitored and maintained or improved  Outcome: Progressing     Problem: Skin/Tissue Integrity  Goal: Absence of new skin breakdown  Description: 1. Monitor for areas of redness and/or skin breakdown  2. Assess vascular access sites hourly  3. Every 4-6 hours minimum:  Change oxygen saturation probe site  4. Every 4-6 hours:  If on nasal continuous positive airway pressure, respiratory therapy assess nares and determine need for appliance change or resting period.   Outcome: Progressing
Problem: Discharge Planning  Goal: Discharge to home or other facility with appropriate resources  Outcome: Progressing  Flowsheets (Taken 9/11/2023 1247)  Discharge to home or other facility with appropriate resources:   Identify barriers to discharge with patient and caregiver   Arrange for needed discharge resources and transportation as appropriate   Identify discharge learning needs (meds, wound care, etc)     Problem: Safety - Adult  Goal: Free from fall injury  Outcome: Progressing  Flowsheets (Taken 9/11/2023 1247)  Free From Fall Injury:   Instruct family/caregiver on patient safety   Based on caregiver fall risk screen, instruct family/caregiver to ask for assistance with transferring infant if caregiver noted to have fall risk factors     Problem: Pain  Goal: Verbalizes/displays adequate comfort level or baseline comfort level  Outcome: Progressing  Flowsheets (Taken 9/11/2023 1247)  Verbalizes/displays adequate comfort level or baseline comfort level:   Encourage patient to monitor pain and request assistance   Assess pain using appropriate pain scale   Administer analgesics based on type and severity of pain and evaluate response   Implement non-pharmacological measures as appropriate and evaluate response     Problem: Chronic Conditions and Co-morbidities  Goal: Patient's chronic conditions and co-morbidity symptoms are monitored and maintained or improved  Outcome: Progressing  Flowsheets (Taken 9/11/2023 1247)  Care Plan - Patient's Chronic Conditions and Co-Morbidity Symptoms are Monitored and Maintained or Improved:   Monitor and assess patient's chronic conditions and comorbid symptoms for stability, deterioration, or improvement   Collaborate with multidisciplinary team to address chronic and comorbid conditions and prevent exacerbation or deterioration   Update acute care plan with appropriate goals if chronic or comorbid symptoms are exacerbated and prevent overall improvement and discharge
Problem: Discharge Planning  Goal: Discharge to home or other facility with appropriate resources  Outcome: Progressing  Flowsheets (Taken 9/6/2023 1916)  Discharge to home or other facility with appropriate resources:   Identify barriers to discharge with patient and caregiver   Arrange for needed discharge resources and transportation as appropriate   Identify discharge learning needs (meds, wound care, etc)     Problem: Safety - Adult  Goal: Free from fall injury  Outcome: Progressing     Problem: Pain  Goal: Verbalizes/displays adequate comfort level or baseline comfort level  Outcome: Progressing     Problem: Chronic Conditions and Co-morbidities  Goal: Patient's chronic conditions and co-morbidity symptoms are monitored and maintained or improved  Outcome: Progressing  Flowsheets (Taken 9/6/2023 1916)  Care Plan - Patient's Chronic Conditions and Co-Morbidity Symptoms are Monitored and Maintained or Improved:   Monitor and assess patient's chronic conditions and comorbid symptoms for stability, deterioration, or improvement   Collaborate with multidisciplinary team to address chronic and comorbid conditions and prevent exacerbation or deterioration   Update acute care plan with appropriate goals if chronic or comorbid symptoms are exacerbated and prevent overall improvement and discharge     Problem: Skin/Tissue Integrity  Goal: Absence of new skin breakdown  Description: 1. Monitor for areas of redness and/or skin breakdown  2. Assess vascular access sites hourly  3. Every 4-6 hours minimum:  Change oxygen saturation probe site  4. Every 4-6 hours:  If on nasal continuous positive airway pressure, respiratory therapy assess nares and determine need for appliance change or resting period.   Outcome: Progressing
deterioration   Update acute care plan with appropriate goals if chronic or comorbid symptoms are exacerbated and prevent overall improvement and discharge     Problem: Skin/Tissue Integrity  Goal: Absence of new skin breakdown  Description: 1. Monitor for areas of redness and/or skin breakdown  2. Assess vascular access sites hourly  3. Every 4-6 hours minimum:  Change oxygen saturation probe site  4. Every 4-6 hours:  If on nasal continuous positive airway pressure, respiratory therapy assess nares and determine need for appliance change or resting period.   Outcome: Progressing
improvement   Collaborate with multidisciplinary team to address chronic and comorbid conditions and prevent exacerbation or deterioration   Update acute care plan with appropriate goals if chronic or comorbid symptoms are exacerbated and prevent overall improvement and discharge     Problem: Skin/Tissue Integrity  Goal: Absence of new skin breakdown  Description: 1. Monitor for areas of redness and/or skin breakdown  2. Assess vascular access sites hourly  3. Every 4-6 hours minimum:  Change oxygen saturation probe site  4. Every 4-6 hours:  If on nasal continuous positive airway pressure, respiratory therapy assess nares and determine need for appliance change or resting period.   9/11/2023 1247 by Thalia Cochran RN  Outcome: Progressing
period.   Outcome: Progressing

## 2023-09-19 ENCOUNTER — OFFICE VISIT (OUTPATIENT)
Age: 53
End: 2023-09-19
Payer: MEDICARE

## 2023-09-19 VITALS
WEIGHT: 203.2 LBS | SYSTOLIC BLOOD PRESSURE: 118 MMHG | HEIGHT: 65 IN | DIASTOLIC BLOOD PRESSURE: 60 MMHG | HEART RATE: 90 BPM | BODY MASS INDEX: 33.85 KG/M2

## 2023-09-19 DIAGNOSIS — I10 ESSENTIAL (PRIMARY) HYPERTENSION: ICD-10-CM

## 2023-09-19 DIAGNOSIS — R94.6 ABNORMAL RESULTS OF THYROID FUNCTION STUDIES: ICD-10-CM

## 2023-09-19 DIAGNOSIS — R79.89 ELEVATED LFTS: ICD-10-CM

## 2023-09-19 DIAGNOSIS — E10.65 TYPE 1 DIABETES MELLITUS WITH HYPERGLYCEMIA (HCC): Primary | ICD-10-CM

## 2023-09-19 DIAGNOSIS — E78.2 MIXED HYPERLIPIDEMIA: ICD-10-CM

## 2023-09-19 PROCEDURE — 1111F DSCHRG MED/CURRENT MED MERGE: CPT | Performed by: INTERNAL MEDICINE

## 2023-09-19 PROCEDURE — G8417 CALC BMI ABV UP PARAM F/U: HCPCS | Performed by: INTERNAL MEDICINE

## 2023-09-19 PROCEDURE — 4004F PT TOBACCO SCREEN RCVD TLK: CPT | Performed by: INTERNAL MEDICINE

## 2023-09-19 PROCEDURE — 3017F COLORECTAL CA SCREEN DOC REV: CPT | Performed by: INTERNAL MEDICINE

## 2023-09-19 PROCEDURE — 99214 OFFICE O/P EST MOD 30 MIN: CPT | Performed by: INTERNAL MEDICINE

## 2023-09-19 PROCEDURE — 2022F DILAT RTA XM EVC RTNOPTHY: CPT | Performed by: INTERNAL MEDICINE

## 2023-09-19 PROCEDURE — 3074F SYST BP LT 130 MM HG: CPT | Performed by: INTERNAL MEDICINE

## 2023-09-19 PROCEDURE — 3052F HG A1C>EQUAL 8.0%<EQUAL 9.0%: CPT | Performed by: INTERNAL MEDICINE

## 2023-09-19 PROCEDURE — 3078F DIAST BP <80 MM HG: CPT | Performed by: INTERNAL MEDICINE

## 2023-09-19 PROCEDURE — G8427 DOCREV CUR MEDS BY ELIG CLIN: HCPCS | Performed by: INTERNAL MEDICINE

## 2023-09-19 NOTE — PROGRESS NOTES
in 9/19 and normal ever since, last in 1/23  - optho UTD 1/21        2) HTN NOS (401.9): Blood pressure was at goal < 140/90 off coreg and damari   - cont bumex 2 mg 1.5 tabs bid      3) Hyperlipidemia (272.4): Given DM, goal LDL is < 100, non-HDL < 130, and TGs < 150. LDL was 146 in January 2010, down to 124 in July and 93 in March 2011 with taking 5 mg of crestor daily. Her crestor was changed to pravastatin by Dr. Janeen Golden because of cost in Feb 2012. LDL 93 in 8/12 but her HDL was high at 138 due to ETOH intake. LDL down to 39 in 11/12 but up to 102 in 3/13. Off pravastatin at this time and previously was on 10 mg daily.  in 1/14. Up to 167 in 4/14 so started lovastatin 20 mg daily but she couldn't afford this. She has been started on crestor 40 mg daily by crossover clinic and LDL 64 in 10/14 and 55 in 1/15. Was 37 in 3/16 so dose decreased to 20 mg daily and LDL 23 in 2/17. Was changed to lipitor 1/2 of 80 mg daily when clinic couldn't get crestor any longer and LDL 37 in 9/17. This was stopped during her hospital stay in 11/17 but was restarted by Dr. Checo James in 12/17 and 42 in 4/18. Up to 94 in 8/18. Down to 79 in 1/19 and 88 in 9/19 and 78 in 12/19 and 61 in 7/20 and 54 in 1/21. Changed to crestor 40 mg sometime in the spring of 2021 and LDL 86 in 11/21 and 54 in 3/22 and 52 in 6/22 and 53 in 1/23 and 33 in 5/23  - cont crestor 40 mg daily  - check lipids in the future        4) Elevated LFTs (790.6): I told her previously that her LFTs were elevated likely due to ETOH intake so I advised her to cut back on this and her repeat LFTs were normal in 3/13 and 1/14 and 4/14 and 10/14 and 1/15 and 12/15 and 3/16. AST up to 76 in 2/17 but back to normal in 5/17 and has been normal ever since so will follow this. 5) Borderline abnormal TFT: TSH 2.1 in 12/11 but up to 4.01 in 11/12 and 4.75 in 11/17 during 2 hospital stays. Up to 5.45 in 1/19 with Dr. Checo James.   Repeat TSH 1.24 and FT4

## 2023-09-19 NOTE — PATIENT INSTRUCTIONS
1) Dose your humalog based on the scale below if you are eating:  Blood sugar  Insulin dose          Less than 90  Eat first, 12 units       14 units    151-200  16 units      201-250  18 units     251-300  20 units      301-350  22 units      351-400  24 units  401-450  26 units  451-500  28 units  Over 500  30 units    2) If your sugar is over 200 and you are not eating, you can dose humalog on the scale below:  Blood sugar  Insulin dose          201-250  4 units     251-300  6 units      301-350  8 units      351-400  10 units  401-450  12 units  451-500  14 units  Over 500  16 units    3) Continue lantus 54 units in the morning. 4) Please call your insurance company to find out which DME (durable medical equipment) supplier they work with and have them fax me a form to [phone] to get your freestyle [place] supplies.

## 2023-09-20 DIAGNOSIS — E10.65 TYPE 1 DIABETES MELLITUS WITH HYPERGLYCEMIA (HCC): ICD-10-CM

## 2023-09-20 RX ORDER — PEN NEEDLE, DIABETIC 31 GX5/16"
NEEDLE, DISPOSABLE MISCELLANEOUS
Qty: 400 EACH | Refills: 3 | Status: SHIPPED | OUTPATIENT
Start: 2023-09-20

## 2023-09-24 DIAGNOSIS — E78.2 MIXED HYPERLIPIDEMIA: ICD-10-CM

## 2023-09-24 DIAGNOSIS — I10 ESSENTIAL (PRIMARY) HYPERTENSION: ICD-10-CM

## 2023-09-24 DIAGNOSIS — E10.65 TYPE 1 DIABETES MELLITUS WITH HYPERGLYCEMIA (HCC): ICD-10-CM

## 2023-09-24 DIAGNOSIS — R79.89 ELEVATED LFTS: ICD-10-CM

## 2023-09-25 ENCOUNTER — TELEPHONE (OUTPATIENT)
Age: 53
End: 2023-09-25

## 2023-09-25 DIAGNOSIS — E10.65 TYPE 1 DIABETES MELLITUS WITH HYPERGLYCEMIA (HCC): ICD-10-CM

## 2023-09-25 NOTE — TELEPHONE ENCOUNTER
Pt TSERINGM stating her insurance is requiring a PA for the amount of testing times for test strips. PA initiated through [employer] for  Relion test strips quantity 300 strips per 30 days

## 2023-09-26 RX ORDER — BLOOD-GLUCOSE METER
EACH MISCELLANEOUS
Qty: 1 EACH | Refills: 0 | Status: SHIPPED | OUTPATIENT
Start: 2023-09-26

## 2023-09-26 NOTE — TELEPHONE ENCOUNTER
[facility] called stating per pt's insurance she needs a new prescription for a True Metrix glucometer sent. Informed pharmacist that message will be passed to Dr Tk Gonzalez.

## 2023-10-03 ENCOUNTER — OFFICE VISIT (OUTPATIENT)
Age: 53
End: 2023-10-03
Payer: MEDICARE

## 2023-10-03 VITALS
HEIGHT: 65 IN | DIASTOLIC BLOOD PRESSURE: 62 MMHG | BODY MASS INDEX: 33.12 KG/M2 | HEART RATE: 81 BPM | SYSTOLIC BLOOD PRESSURE: 128 MMHG | WEIGHT: 198.8 LBS

## 2023-10-03 DIAGNOSIS — R79.89 ELEVATED LFTS: ICD-10-CM

## 2023-10-03 DIAGNOSIS — E10.65 TYPE 1 DIABETES MELLITUS WITH HYPERGLYCEMIA (HCC): Primary | ICD-10-CM

## 2023-10-03 DIAGNOSIS — I10 ESSENTIAL (PRIMARY) HYPERTENSION: ICD-10-CM

## 2023-10-03 DIAGNOSIS — R94.6 ABNORMAL RESULTS OF THYROID FUNCTION STUDIES: ICD-10-CM

## 2023-10-03 DIAGNOSIS — E78.2 MIXED HYPERLIPIDEMIA: ICD-10-CM

## 2023-10-03 PROCEDURE — 2022F DILAT RTA XM EVC RTNOPTHY: CPT | Performed by: INTERNAL MEDICINE

## 2023-10-03 PROCEDURE — 99214 OFFICE O/P EST MOD 30 MIN: CPT | Performed by: INTERNAL MEDICINE

## 2023-10-03 PROCEDURE — 1111F DSCHRG MED/CURRENT MED MERGE: CPT | Performed by: INTERNAL MEDICINE

## 2023-10-03 PROCEDURE — 3074F SYST BP LT 130 MM HG: CPT | Performed by: INTERNAL MEDICINE

## 2023-10-03 PROCEDURE — 3017F COLORECTAL CA SCREEN DOC REV: CPT | Performed by: INTERNAL MEDICINE

## 2023-10-03 PROCEDURE — 3052F HG A1C>EQUAL 8.0%<EQUAL 9.0%: CPT | Performed by: INTERNAL MEDICINE

## 2023-10-03 PROCEDURE — G8427 DOCREV CUR MEDS BY ELIG CLIN: HCPCS | Performed by: INTERNAL MEDICINE

## 2023-10-03 PROCEDURE — G8417 CALC BMI ABV UP PARAM F/U: HCPCS | Performed by: INTERNAL MEDICINE

## 2023-10-03 PROCEDURE — G8484 FLU IMMUNIZE NO ADMIN: HCPCS | Performed by: INTERNAL MEDICINE

## 2023-10-03 PROCEDURE — 4004F PT TOBACCO SCREEN RCVD TLK: CPT | Performed by: INTERNAL MEDICINE

## 2023-10-03 PROCEDURE — 3078F DIAST BP <80 MM HG: CPT | Performed by: INTERNAL MEDICINE

## 2023-10-03 RX ORDER — INSULIN GLARGINE 100 [IU]/ML
INJECTION, SOLUTION SUBCUTANEOUS
Qty: 20 ML | Refills: 11
Start: 2023-10-03

## 2023-10-03 NOTE — PROGRESS NOTES
Chief Complaint   Patient presents with    Diabetes     PCP and pharmacy confirmed      History of Present Illness: Troy Bee is a 47 y.o. female here for follow up of diabetes. Weight down 5 lbs since last visit in 9/23. I just submitted an order to [facility] yesterday for her to get the [place] system. Has been using fingersticks for now. Was having some overnight lows despite decreasing her lantus from 54 to 52 units and went to 50 units about a week ago. Review of her meter over the past 10 days shows many readings in the 100-200 range and a few under 90 but not too many over 200 as her pain and stress has been better. Compliant with meds below.     she has the following indications to begin treatment with Freestyle Joanne:  1) she has type 1 diabetes (E10.65) and is on an intensive insulin regimen with 4 injections per day  2) she tests her blood sugar 10 times per day and makes treatment decisions off her blood sugar readings and will do the same off freestyle joanne sensor readings  3) she will require frequent adjustments to her insulin injection doses based on her freestyle joanne sensor readings  4) she will benefit from therapeutic continuous glucose monitoring and I recommend that she begin this  5) she is seen in my office every 1-3 months      Current Outpatient Medications   Medication Sig    insulin glargine (LANTUS) 100 UNIT/ML injection vial Inject 50 units in the AM--Dose change 10/03/23--updated med list--did not send prescription to the pharmacy    Blood Glucose Monitoring Suppl (TRUE METRIX METER) PATRICIA Test 10 times daily Dx Code: E10.65    RELION GLUCOSE TEST STRIPS strip TEST 10 TIMES DAILY DUE TO FLUCTUATING BLOOD SUGARS    Insulin Pen Needle (B-D ULTRAFINE III SHORT PEN) 31G X 8 MM MISC Use to inject insulin QID    insulin lispro (HUMALOG) 100 UNIT/ML SOLN injection vial Inject 14 units before meals + 2 units for every 50 mg/dl above 150 mg/dl--max 50 units/day    oxyCODONE

## 2023-10-03 NOTE — PATIENT INSTRUCTIONS
1) Stay on the current doses of insulin. 2) You should be approved for the [place] sensors and reader through [employer]. Call them at [phone] with any problems getting this shipped to you. 3) Your blood pressure is controlled.

## 2023-10-09 ENCOUNTER — TELEPHONE (OUTPATIENT)
Age: 53
End: 2023-10-09

## 2023-10-09 RX ORDER — INSULIN LISPRO 100 [IU]/ML
INJECTION, SOLUTION INTRAVENOUS; SUBCUTANEOUS
Qty: 90 ML | Refills: 3 | OUTPATIENT
Start: 2023-10-09

## 2023-10-09 RX ORDER — INSULIN ASPART 100 [IU]/ML
INJECTION, SOLUTION INTRAVENOUS; SUBCUTANEOUS
Qty: 20 ML | Refills: 11 | Status: SHIPPED | OUTPATIENT
Start: 2023-10-09

## 2023-10-09 NOTE — TELEPHONE ENCOUNTER
Please let her know that her insurance no longer prefers humalog but does cover novolog so I sent these vials to [facility].

## 2023-10-10 ENCOUNTER — OFFICE VISIT (OUTPATIENT)
Age: 53
End: 2023-10-10
Payer: MEDICARE

## 2023-10-10 VITALS
RESPIRATION RATE: 16 BRPM | HEART RATE: 89 BPM | DIASTOLIC BLOOD PRESSURE: 62 MMHG | TEMPERATURE: 97.8 F | OXYGEN SATURATION: 93 % | HEIGHT: 65 IN | SYSTOLIC BLOOD PRESSURE: 127 MMHG | BODY MASS INDEX: 29.16 KG/M2 | WEIGHT: 175 LBS

## 2023-10-10 DIAGNOSIS — E10.42 DIABETIC PERIPHERAL NEUROPATHY ASSOCIATED WITH TYPE 1 DIABETES MELLITUS (HCC): ICD-10-CM

## 2023-10-10 DIAGNOSIS — G21.19 DRUG-INDUCED PARKINSON'S DISEASE (HCC): Primary | ICD-10-CM

## 2023-10-10 DIAGNOSIS — G25.0 BENIGN ESSENTIAL TREMOR SYNDROME: ICD-10-CM

## 2023-10-10 DIAGNOSIS — R25.1 TREMORS OF NERVOUS SYSTEM: ICD-10-CM

## 2023-10-10 PROBLEM — E05.90 HYPERTHYROIDISM: Status: ACTIVE | Noted: 2023-10-10

## 2023-10-10 PROCEDURE — 2022F DILAT RTA XM EVC RTNOPTHY: CPT | Performed by: PSYCHIATRY & NEUROLOGY

## 2023-10-10 PROCEDURE — 3078F DIAST BP <80 MM HG: CPT | Performed by: PSYCHIATRY & NEUROLOGY

## 2023-10-10 PROCEDURE — 4004F PT TOBACCO SCREEN RCVD TLK: CPT | Performed by: PSYCHIATRY & NEUROLOGY

## 2023-10-10 PROCEDURE — G8417 CALC BMI ABV UP PARAM F/U: HCPCS | Performed by: PSYCHIATRY & NEUROLOGY

## 2023-10-10 PROCEDURE — 3074F SYST BP LT 130 MM HG: CPT | Performed by: PSYCHIATRY & NEUROLOGY

## 2023-10-10 PROCEDURE — 3017F COLORECTAL CA SCREEN DOC REV: CPT | Performed by: PSYCHIATRY & NEUROLOGY

## 2023-10-10 PROCEDURE — 99204 OFFICE O/P NEW MOD 45 MIN: CPT | Performed by: PSYCHIATRY & NEUROLOGY

## 2023-10-10 PROCEDURE — 3052F HG A1C>EQUAL 8.0%<EQUAL 9.0%: CPT | Performed by: PSYCHIATRY & NEUROLOGY

## 2023-10-10 PROCEDURE — G8427 DOCREV CUR MEDS BY ELIG CLIN: HCPCS | Performed by: PSYCHIATRY & NEUROLOGY

## 2023-10-10 PROCEDURE — G8484 FLU IMMUNIZE NO ADMIN: HCPCS | Performed by: PSYCHIATRY & NEUROLOGY

## 2023-10-10 PROCEDURE — 1111F DSCHRG MED/CURRENT MED MERGE: CPT | Performed by: PSYCHIATRY & NEUROLOGY

## 2023-10-10 RX ORDER — VITAMIN B COMPLEX
1 CAPSULE ORAL DAILY
COMMUNITY

## 2023-10-10 ASSESSMENT — PATIENT HEALTH QUESTIONNAIRE - PHQ9
SUM OF ALL RESPONSES TO PHQ QUESTIONS 1-9: 0
2. FEELING DOWN, DEPRESSED OR HOPELESS: 0
1. LITTLE INTEREST OR PLEASURE IN DOING THINGS: 0
SUM OF ALL RESPONSES TO PHQ9 QUESTIONS 1 & 2: 0

## 2023-10-10 NOTE — PROGRESS NOTES
4/5 strength in upper and lower proximal and distal muscles. Normal bulk and tone, except for increased cogwheeling and rigidity in both upper extremities, left side greater than right. No fasciculations. Rapid alternating movement is symmetric and slow bilaterally   Reflexes:   Deep tendon reflexes 1+/4 and symmetrical.  No babinski or clonus present   Sensory:   Abnormal to touch, pinprick and vibration and temperature in both feet to ankle level. DSS is intact   Gait:  Abnormal gait for patient's age, as she moves slowly, and walks with a limp because of arthritis in her knees, and has decreased arm swing on the left side in addition. .   Tremor:   Patient has moderate resting tremor in the left upper extremity with minimal intention component, and mild resting tremor in the right side. Slight head tremor noted. Cerebellar:  Mildly abnormal cerebellar signs present on Romberg and tandem testing and finger-nose-finger exam shows bilateral resting tremors, left greater than right and slight head tremor. Neurovascular:  Normal heart sounds and regular rhythm, peripheral pulses decreased, and no carotid bruits. Assessment:      Diagnosis Orders   1. Drug-induced Parkinson's disease (HCC)  carbidopa-levodopa (SINEMET)  MG per tablet      2. Benign essential tremor syndrome  carbidopa-levodopa (SINEMET)  MG per tablet      3. Tremors of nervous system  carbidopa-levodopa (SINEMET)  MG per tablet      4. Diabetic peripheral neuropathy associated with type 1 diabetes mellitus (HCC)  carbidopa-levodopa (SINEMET)  MG per tablet        Active Problems:    * No active hospital problems. *  Resolved Problems:    * No resolved hospital problems. *      Plan:     Patient with dominant parkinsonian symptoms, most likely secondary to her Abilify and probably has drug-induced Parkinson's disease.   We encouraged her to see a psychiatrist in the near future, to consider getting her off the

## 2023-11-13 RX ORDER — METOCLOPRAMIDE 5 MG/1
TABLET ORAL
Qty: 60 TABLET | Refills: 3 | Status: SHIPPED | OUTPATIENT
Start: 2023-11-13

## 2023-11-20 ENCOUNTER — TELEPHONE (OUTPATIENT)
Age: 53
End: 2023-11-20

## 2023-11-27 ENCOUNTER — TELEPHONE (OUTPATIENT)
Age: 53
End: 2023-11-27

## 2023-11-27 NOTE — TELEPHONE ENCOUNTER
Pt LVM stating she has been discharged from the hospital and would like to discuss insulin. Pt states she was sent to the ER after a visit with another physician for her pulse ox being 82. Pt states she has an appt with a pulmonologist on 12/07/2023. She states she is now down to 164 lbs. Pt states she is now taking 31 units of Lantus and she asked that her sliding scale be changed as well. She stated she had a low blood glucose of 41 after taking only 3 units of short acting. Please advise.

## 2023-11-27 NOTE — TELEPHONE ENCOUNTER
Can you see if she can come at 2:30pm tomorrow for a sooner visit than the 12/12/23 where she is currently scheduled so we can determine a plan?

## 2023-11-27 NOTE — TELEPHONE ENCOUNTER
LVM asking pt to contact office regarding being seen tomorrow at 2:30 P per Dr Sukh Ramirez. Callback number left.

## 2023-11-28 NOTE — TELEPHONE ENCOUNTER
LVM x 2 asking pt if she can be seen today by Dr Argenis Trujillo at the [place] office at 2:30 PM.

## 2023-11-29 ENCOUNTER — TELEPHONE (OUTPATIENT)
Age: 53
End: 2023-11-29

## 2023-11-29 NOTE — TELEPHONE ENCOUNTER
Pt returned call regarding appt. Pt states she has an appt at another physician's office tomorrow at 11:30 AM but can come to the office to be seen in the afternoon if possible. Please advise.

## 2023-11-30 ENCOUNTER — OFFICE VISIT (OUTPATIENT)
Age: 53
End: 2023-11-30
Payer: MEDICARE

## 2023-11-30 VITALS
HEART RATE: 94 BPM | DIASTOLIC BLOOD PRESSURE: 69 MMHG | BODY MASS INDEX: 28.39 KG/M2 | WEIGHT: 170.4 LBS | HEIGHT: 65 IN | SYSTOLIC BLOOD PRESSURE: 132 MMHG

## 2023-11-30 DIAGNOSIS — I10 ESSENTIAL (PRIMARY) HYPERTENSION: ICD-10-CM

## 2023-11-30 DIAGNOSIS — R79.89 ELEVATED LFTS: ICD-10-CM

## 2023-11-30 DIAGNOSIS — E10.65 TYPE 1 DIABETES MELLITUS WITH HYPERGLYCEMIA (HCC): Primary | ICD-10-CM

## 2023-11-30 DIAGNOSIS — R94.6 ABNORMAL RESULTS OF THYROID FUNCTION STUDIES: ICD-10-CM

## 2023-11-30 DIAGNOSIS — E78.2 MIXED HYPERLIPIDEMIA: ICD-10-CM

## 2023-11-30 PROCEDURE — 3017F COLORECTAL CA SCREEN DOC REV: CPT | Performed by: INTERNAL MEDICINE

## 2023-11-30 PROCEDURE — 99214 OFFICE O/P EST MOD 30 MIN: CPT | Performed by: INTERNAL MEDICINE

## 2023-11-30 PROCEDURE — G8417 CALC BMI ABV UP PARAM F/U: HCPCS | Performed by: INTERNAL MEDICINE

## 2023-11-30 PROCEDURE — 4004F PT TOBACCO SCREEN RCVD TLK: CPT | Performed by: INTERNAL MEDICINE

## 2023-11-30 PROCEDURE — G8427 DOCREV CUR MEDS BY ELIG CLIN: HCPCS | Performed by: INTERNAL MEDICINE

## 2023-11-30 PROCEDURE — 2022F DILAT RTA XM EVC RTNOPTHY: CPT | Performed by: INTERNAL MEDICINE

## 2023-11-30 PROCEDURE — 3078F DIAST BP <80 MM HG: CPT | Performed by: INTERNAL MEDICINE

## 2023-11-30 PROCEDURE — G8484 FLU IMMUNIZE NO ADMIN: HCPCS | Performed by: INTERNAL MEDICINE

## 2023-11-30 PROCEDURE — 3052F HG A1C>EQUAL 8.0%<EQUAL 9.0%: CPT | Performed by: INTERNAL MEDICINE

## 2023-11-30 PROCEDURE — 3075F SYST BP GE 130 - 139MM HG: CPT | Performed by: INTERNAL MEDICINE

## 2023-11-30 RX ORDER — CALCIUM CARBONATE 300MG(750)
1 TABLET,CHEWABLE ORAL DAILY
COMMUNITY

## 2023-11-30 RX ORDER — INSULIN GLARGINE 100 [IU]/ML
INJECTION, SOLUTION SUBCUTANEOUS
Qty: 10 ML | Refills: 11 | Status: SHIPPED | OUTPATIENT
Start: 2023-11-30

## 2023-11-30 RX ORDER — BUMETANIDE 1 MG/1
1 TABLET ORAL 2 TIMES DAILY
COMMUNITY

## 2023-11-30 RX ORDER — INSULIN ASPART 100 [IU]/ML
INJECTION, SOLUTION INTRAVENOUS; SUBCUTANEOUS
Qty: 20 ML | Refills: 11
Start: 2023-11-30 | End: 2023-11-30 | Stop reason: SDUPTHER

## 2023-11-30 RX ORDER — LEVONORGESTREL 52 MG/1
1 INTRAUTERINE DEVICE INTRAUTERINE ONCE
COMMUNITY

## 2023-11-30 RX ORDER — MULTIVITAMIN
1 TABLET ORAL DAILY
COMMUNITY

## 2023-11-30 RX ORDER — INSULIN ASPART 100 [IU]/ML
INJECTION, SOLUTION INTRAVENOUS; SUBCUTANEOUS
Qty: 20 ML | Refills: 11 | Status: SHIPPED | OUTPATIENT
Start: 2023-11-30

## 2023-11-30 RX ORDER — INSULIN GLARGINE 100 [IU]/ML
INJECTION, SOLUTION SUBCUTANEOUS
Qty: 20 ML | Refills: 11 | Status: SHIPPED
Start: 2023-11-30 | End: 2023-11-30 | Stop reason: SDUPTHER

## 2023-11-30 RX ORDER — DULOXETIN HYDROCHLORIDE 20 MG/1
CAPSULE, DELAYED RELEASE ORAL
Qty: 30 CAPSULE | Refills: 11 | Status: SHIPPED | OUTPATIENT
Start: 2023-11-30

## 2023-11-30 RX ORDER — FAMOTIDINE 40 MG/1
40 TABLET, FILM COATED ORAL
COMMUNITY

## 2023-11-30 RX ORDER — CITALOPRAM 40 MG/1
40 TABLET ORAL DAILY
COMMUNITY

## 2023-11-30 NOTE — TELEPHONE ENCOUNTER
Pt states she could come this afternoon. She says her other appt is at 11:30 AM and usually lasts an hour. She says she will be here after that appt.

## 2023-11-30 NOTE — PROGRESS NOTES
tracking your sugars and bring the reader to the visit on the 12th. 4) We will try 20 mg of duloxetine (cymbalta) 1 cap at bedtime to help with nerve pain and depression. This will be ready for  at the pharmacy today.         Return 12/12/23 at 9:30am.        Copy sent to:  MD Dr. Fidel Olivera Dr., Dr.

## 2023-11-30 NOTE — PATIENT INSTRUCTIONS
1) Starting tomorrow, take 24 units of lantus in the morning. 2) Dose your novolog based on the scales below:    Eating  Blood sugar  Insulin dose          Less than 90  Eat first, take 5 units       6 units    151-200  7 units      201-250  8 units     251-300  9 units      301-350  10 units      351-400  11 units  401-450  12 units  451-500  13 units  Over 500  14 units    Not Eating (to correct for high sugars at least 3 hours after last dose of novolog)  Blood sugar  Insulin dose          Less than 180  None    181-230  2 units    231-280  3 units      281-330  4 units     331-380  5 units      381-430  6 units      431-480  7 units  Over 480  8 units    3) Please apply the michele sensor today so you can start tracking your sugars and bring the reader to the visit on the 12th. 4) We will try 20 mg of duloxetine (cymbalta) 1 cap at bedtime to help with nerve pain and depression. This will be ready for  at the pharmacy today.

## 2023-12-12 ENCOUNTER — OFFICE VISIT (OUTPATIENT)
Age: 53
End: 2023-12-12
Payer: MEDICARE

## 2023-12-12 VITALS
HEART RATE: 82 BPM | HEIGHT: 65 IN | SYSTOLIC BLOOD PRESSURE: 131 MMHG | WEIGHT: 173 LBS | BODY MASS INDEX: 28.82 KG/M2 | DIASTOLIC BLOOD PRESSURE: 73 MMHG

## 2023-12-12 DIAGNOSIS — R79.89 ELEVATED LFTS: ICD-10-CM

## 2023-12-12 DIAGNOSIS — E78.2 MIXED HYPERLIPIDEMIA: ICD-10-CM

## 2023-12-12 DIAGNOSIS — I10 ESSENTIAL (PRIMARY) HYPERTENSION: ICD-10-CM

## 2023-12-12 DIAGNOSIS — R94.6 ABNORMAL RESULTS OF THYROID FUNCTION STUDIES: ICD-10-CM

## 2023-12-12 DIAGNOSIS — E10.65 TYPE 1 DIABETES MELLITUS WITH HYPERGLYCEMIA (HCC): Primary | ICD-10-CM

## 2023-12-12 LAB — HBA1C MFR BLD: 8 %

## 2023-12-12 PROCEDURE — 99214 OFFICE O/P EST MOD 30 MIN: CPT | Performed by: INTERNAL MEDICINE

## 2023-12-12 PROCEDURE — 3075F SYST BP GE 130 - 139MM HG: CPT | Performed by: INTERNAL MEDICINE

## 2023-12-12 PROCEDURE — 3078F DIAST BP <80 MM HG: CPT | Performed by: INTERNAL MEDICINE

## 2023-12-12 PROCEDURE — 2022F DILAT RTA XM EVC RTNOPTHY: CPT | Performed by: INTERNAL MEDICINE

## 2023-12-12 PROCEDURE — 3017F COLORECTAL CA SCREEN DOC REV: CPT | Performed by: INTERNAL MEDICINE

## 2023-12-12 PROCEDURE — 83036 HEMOGLOBIN GLYCOSYLATED A1C: CPT | Performed by: INTERNAL MEDICINE

## 2023-12-12 PROCEDURE — 4004F PT TOBACCO SCREEN RCVD TLK: CPT | Performed by: INTERNAL MEDICINE

## 2023-12-12 PROCEDURE — G8427 DOCREV CUR MEDS BY ELIG CLIN: HCPCS | Performed by: INTERNAL MEDICINE

## 2023-12-12 PROCEDURE — G8484 FLU IMMUNIZE NO ADMIN: HCPCS | Performed by: INTERNAL MEDICINE

## 2023-12-12 PROCEDURE — G8417 CALC BMI ABV UP PARAM F/U: HCPCS | Performed by: INTERNAL MEDICINE

## 2023-12-12 PROCEDURE — 95251 CONT GLUC MNTR ANALYSIS I&R: CPT | Performed by: INTERNAL MEDICINE

## 2023-12-12 PROCEDURE — 3052F HG A1C>EQUAL 8.0%<EQUAL 9.0%: CPT | Performed by: INTERNAL MEDICINE

## 2023-12-12 RX ORDER — SPIRONOLACTONE 50 MG/1
TABLET, FILM COATED ORAL
COMMUNITY
Start: 2023-11-09

## 2023-12-12 RX ORDER — INSULIN ASPART 100 [IU]/ML
INJECTION, SOLUTION INTRAVENOUS; SUBCUTANEOUS
Qty: 20 ML | Refills: 11
Start: 2023-12-12

## 2023-12-12 RX ORDER — BUMETANIDE 2 MG/1
2 TABLET ORAL 2 TIMES DAILY
COMMUNITY

## 2023-12-12 RX ORDER — INSULIN GLARGINE 100 [IU]/ML
INJECTION, SOLUTION SUBCUTANEOUS
Qty: 10 ML | Refills: 11
Start: 2023-12-12

## 2023-12-12 NOTE — PATIENT INSTRUCTIONS
1) Starting tomorrow, take 20 units of lantus in the morning.     2) Dose your novolog based on the scales below:    Eating  Blood sugar  Insulin dose          Less than 90  Eat first, take 4 units       5 units    151-200  6 units      201-250  7 units     251-300  8 units      301-350  9 units      351-400  10 units  401-450  11 units  451-500  12 units  Over 500  13 units    Not Eating (to correct for high sugars at least 3 hours after last dose of novolog)  Blood sugar  Insulin dose          Less than 200  None    200-275  2 units    276-350  3 units      351-425  4 units     Over 425  5 units

## 2023-12-12 NOTE — PROGRESS NOTES
morning.     2) Dose your novolog based on the scales below:    Eating  Blood sugar  Insulin dose          Less than 90  Eat first, take 4 units       5 units    151-200  6 units      201-250  7 units     251-300  8 units      301-350  9 units      351-400  10 units  401-450  11 units  451-500  12 units  Over 500  13 units    Not Eating (to correct for high sugars at least 3 hours after last dose of novolog)  Blood sugar  Insulin dose          Less than 200  None    200-275  2 units    276-350  3 units      351-425  4 units     Over 425  5 units            Return 1/25/24 at 10:30am.        Copy sent to:  MD Dr. Racquel Contreras Dr., Dr., Dr.

## 2023-12-13 ENCOUNTER — TELEPHONE (OUTPATIENT)
Age: 53
End: 2023-12-13

## 2023-12-13 NOTE — TELEPHONE ENCOUNTER
Pt LVM asking for her A1C result from yesterday. LVM on pt's voicemail stating name, informing her her A1C result from 12/12/23 was 8.0%.

## 2023-12-26 ENCOUNTER — TELEPHONE (OUTPATIENT)
Age: 53
End: 2023-12-26

## 2023-12-26 ENCOUNTER — HOSPITAL ENCOUNTER (INPATIENT)
Facility: HOSPITAL | Age: 53
LOS: 3 days | Discharge: HOME | End: 2023-12-29
Attending: EMERGENCY MEDICINE | Admitting: STUDENT IN AN ORGANIZED HEALTH CARE EDUCATION/TRAINING PROGRAM
Payer: MEDICARE

## 2023-12-26 ENCOUNTER — APPOINTMENT (OUTPATIENT)
Facility: HOSPITAL | Age: 53
End: 2023-12-26
Payer: MEDICARE

## 2023-12-26 DIAGNOSIS — R73.9 HYPERGLYCEMIA: ICD-10-CM

## 2023-12-26 DIAGNOSIS — N17.9 AKI (ACUTE KIDNEY INJURY) (HCC): Primary | ICD-10-CM

## 2023-12-26 DIAGNOSIS — R19.7 DIARRHEA, UNSPECIFIED TYPE: ICD-10-CM

## 2023-12-26 DIAGNOSIS — R11.2 NAUSEA AND VOMITING, UNSPECIFIED VOMITING TYPE: ICD-10-CM

## 2023-12-26 LAB
ALBUMIN SERPL-MCNC: 3.7 G/DL (ref 3.5–5)
ALBUMIN/GLOB SERPL: 0.8 (ref 1.1–2.2)
ALP SERPL-CCNC: 119 U/L (ref 45–117)
ALT SERPL-CCNC: 24 U/L (ref 12–78)
ANION GAP SERPL CALC-SCNC: 4 MMOL/L (ref 5–15)
AST SERPL-CCNC: 23 U/L (ref 15–37)
BASOPHILS # BLD: 0 K/UL (ref 0–0.1)
BASOPHILS NFR BLD: 0 % (ref 0–1)
BILIRUB SERPL-MCNC: 0.2 MG/DL (ref 0.2–1)
BUN SERPL-MCNC: 32 MG/DL (ref 6–20)
BUN/CREAT SERPL: 16 (ref 12–20)
CALCIUM SERPL-MCNC: 9.7 MG/DL (ref 8.5–10.1)
CHLORIDE SERPL-SCNC: 90 MMOL/L (ref 97–108)
CO2 SERPL-SCNC: 34 MMOL/L (ref 21–32)
CREAT SERPL-MCNC: 1.98 MG/DL (ref 0.55–1.02)
DIFFERENTIAL METHOD BLD: ABNORMAL
EOSINOPHIL # BLD: 0.1 K/UL (ref 0–0.4)
EOSINOPHIL NFR BLD: 1 % (ref 0–7)
ERYTHROCYTE [DISTWIDTH] IN BLOOD BY AUTOMATED COUNT: 29 % (ref 11.5–14.5)
GLOBULIN SER CALC-MCNC: 4.4 G/DL (ref 2–4)
GLUCOSE BLD STRIP.AUTO-MCNC: 102 MG/DL (ref 65–117)
GLUCOSE BLD STRIP.AUTO-MCNC: 157 MG/DL (ref 65–117)
GLUCOSE BLD STRIP.AUTO-MCNC: 403 MG/DL (ref 65–117)
GLUCOSE BLD STRIP.AUTO-MCNC: 98 MG/DL (ref 65–117)
GLUCOSE SERPL-MCNC: 153 MG/DL (ref 65–100)
HCT VFR BLD AUTO: 37.5 % (ref 35–47)
HGB BLD-MCNC: 12 G/DL (ref 11.5–16)
IMM GRANULOCYTES # BLD AUTO: 0 K/UL (ref 0–0.04)
IMM GRANULOCYTES NFR BLD AUTO: 0 % (ref 0–0.5)
LIPASE SERPL-CCNC: 9 U/L (ref 13–75)
LYMPHOCYTES # BLD: 2.8 K/UL (ref 0.8–3.5)
LYMPHOCYTES NFR BLD: 32 % (ref 12–49)
MCH RBC QN AUTO: 23.5 PG (ref 26–34)
MCHC RBC AUTO-ENTMCNC: 32 G/DL (ref 30–36.5)
MCV RBC AUTO: 73.4 FL (ref 80–99)
MONOCYTES # BLD: 0.9 K/UL (ref 0–1)
MONOCYTES NFR BLD: 10 % (ref 5–13)
NEUTS SEG # BLD: 5.1 K/UL (ref 1.8–8)
NEUTS SEG NFR BLD: 57 % (ref 32–75)
NRBC # BLD: 0 K/UL (ref 0–0.01)
NRBC BLD-RTO: 0 PER 100 WBC
PLATELET # BLD AUTO: 394 K/UL (ref 150–400)
PMV BLD AUTO: 9.4 FL (ref 8.9–12.9)
POTASSIUM SERPL-SCNC: 3.9 MMOL/L (ref 3.5–5.1)
PROT SERPL-MCNC: 8.1 G/DL (ref 6.4–8.2)
RBC # BLD AUTO: 5.11 M/UL (ref 3.8–5.2)
RBC MORPH BLD: ABNORMAL
RBC MORPH BLD: ABNORMAL
SERVICE CMNT-IMP: ABNORMAL
SERVICE CMNT-IMP: ABNORMAL
SERVICE CMNT-IMP: NORMAL
SERVICE CMNT-IMP: NORMAL
SODIUM SERPL-SCNC: 128 MMOL/L (ref 136–145)
WBC # BLD AUTO: 8.9 K/UL (ref 3.6–11)

## 2023-12-26 PROCEDURE — 83690 ASSAY OF LIPASE: CPT

## 2023-12-26 PROCEDURE — 80053 COMPREHEN METABOLIC PANEL: CPT

## 2023-12-26 PROCEDURE — 96375 TX/PRO/DX INJ NEW DRUG ADDON: CPT

## 2023-12-26 PROCEDURE — 96374 THER/PROPH/DIAG INJ IV PUSH: CPT

## 2023-12-26 PROCEDURE — 1100000003 HC PRIVATE W/ TELEMETRY

## 2023-12-26 PROCEDURE — 82962 GLUCOSE BLOOD TEST: CPT

## 2023-12-26 PROCEDURE — 2500000003 HC RX 250 WO HCPCS: Performed by: EMERGENCY MEDICINE

## 2023-12-26 PROCEDURE — 74176 CT ABD & PELVIS W/O CONTRAST: CPT

## 2023-12-26 PROCEDURE — 36415 COLL VENOUS BLD VENIPUNCTURE: CPT

## 2023-12-26 PROCEDURE — 99285 EMERGENCY DEPT VISIT HI MDM: CPT

## 2023-12-26 PROCEDURE — 2580000003 HC RX 258: Performed by: EMERGENCY MEDICINE

## 2023-12-26 PROCEDURE — 6360000002 HC RX W HCPCS: Performed by: EMERGENCY MEDICINE

## 2023-12-26 PROCEDURE — 85025 COMPLETE CBC W/AUTO DIFF WBC: CPT

## 2023-12-26 RX ORDER — SPIRONOLACTONE 25 MG/1
50 TABLET ORAL DAILY
Status: DISCONTINUED | OUTPATIENT
Start: 2023-12-26 | End: 2023-12-29 | Stop reason: HOSPADM

## 2023-12-26 RX ORDER — SODIUM CHLORIDE 0.9 % (FLUSH) 0.9 %
5-40 SYRINGE (ML) INJECTION PRN
Status: DISCONTINUED | OUTPATIENT
Start: 2023-12-26 | End: 2023-12-29 | Stop reason: HOSPADM

## 2023-12-26 RX ORDER — BUMETANIDE 1 MG/1
2 TABLET ORAL 2 TIMES DAILY
Status: DISCONTINUED | OUTPATIENT
Start: 2023-12-26 | End: 2023-12-29 | Stop reason: HOSPADM

## 2023-12-26 RX ORDER — ONDANSETRON 4 MG/1
4 TABLET, ORALLY DISINTEGRATING ORAL EVERY 8 HOURS PRN
Status: DISCONTINUED | OUTPATIENT
Start: 2023-12-26 | End: 2023-12-29 | Stop reason: HOSPADM

## 2023-12-26 RX ORDER — POLYETHYLENE GLYCOL 3350 17 G/17G
17 POWDER, FOR SOLUTION ORAL DAILY PRN
Status: DISCONTINUED | OUTPATIENT
Start: 2023-12-26 | End: 2023-12-29 | Stop reason: HOSPADM

## 2023-12-26 RX ORDER — HYDROMORPHONE HYDROCHLORIDE 1 MG/ML
0.25 INJECTION, SOLUTION INTRAMUSCULAR; INTRAVENOUS; SUBCUTANEOUS
Status: COMPLETED | OUTPATIENT
Start: 2023-12-26 | End: 2023-12-26

## 2023-12-26 RX ORDER — ENOXAPARIN SODIUM 100 MG/ML
40 INJECTION SUBCUTANEOUS DAILY
Status: DISCONTINUED | OUTPATIENT
Start: 2023-12-27 | End: 2023-12-29 | Stop reason: HOSPADM

## 2023-12-26 RX ORDER — INSULIN LISPRO 100 [IU]/ML
0-8 INJECTION, SOLUTION INTRAVENOUS; SUBCUTANEOUS
Status: DISCONTINUED | OUTPATIENT
Start: 2023-12-27 | End: 2023-12-28

## 2023-12-26 RX ORDER — HYDROMORPHONE HYDROCHLORIDE 1 MG/ML
0.5 INJECTION, SOLUTION INTRAMUSCULAR; INTRAVENOUS; SUBCUTANEOUS
Status: DISCONTINUED | OUTPATIENT
Start: 2023-12-26 | End: 2023-12-26

## 2023-12-26 RX ORDER — 0.9 % SODIUM CHLORIDE 0.9 %
500 INTRAVENOUS SOLUTION INTRAVENOUS ONCE
Status: COMPLETED | OUTPATIENT
Start: 2023-12-26 | End: 2023-12-26

## 2023-12-26 RX ORDER — INSULIN LISPRO 100 [IU]/ML
0-4 INJECTION, SOLUTION INTRAVENOUS; SUBCUTANEOUS NIGHTLY
Status: DISCONTINUED | OUTPATIENT
Start: 2023-12-26 | End: 2023-12-28

## 2023-12-26 RX ORDER — FENTANYL CITRATE 50 UG/ML
50 INJECTION, SOLUTION INTRAMUSCULAR; INTRAVENOUS
Status: COMPLETED | OUTPATIENT
Start: 2023-12-26 | End: 2023-12-26

## 2023-12-26 RX ORDER — CITALOPRAM 20 MG/1
40 TABLET ORAL DAILY
Status: DISCONTINUED | OUTPATIENT
Start: 2023-12-27 | End: 2023-12-29 | Stop reason: HOSPADM

## 2023-12-26 RX ORDER — OXYCODONE HYDROCHLORIDE 5 MG/1
5 TABLET ORAL EVERY 4 HOURS PRN
Status: DISCONTINUED | OUTPATIENT
Start: 2023-12-26 | End: 2023-12-29 | Stop reason: HOSPADM

## 2023-12-26 RX ORDER — ACETAMINOPHEN 325 MG/1
650 TABLET ORAL EVERY 6 HOURS PRN
Status: DISCONTINUED | OUTPATIENT
Start: 2023-12-26 | End: 2023-12-29 | Stop reason: HOSPADM

## 2023-12-26 RX ORDER — SODIUM CHLORIDE 9 MG/ML
INJECTION, SOLUTION INTRAVENOUS CONTINUOUS
Status: ACTIVE | OUTPATIENT
Start: 2023-12-26 | End: 2023-12-27

## 2023-12-26 RX ORDER — ONDANSETRON 2 MG/ML
4 INJECTION INTRAMUSCULAR; INTRAVENOUS ONCE
Status: COMPLETED | OUTPATIENT
Start: 2023-12-26 | End: 2023-12-26

## 2023-12-26 RX ORDER — SODIUM CHLORIDE 0.9 % (FLUSH) 0.9 %
5-40 SYRINGE (ML) INJECTION EVERY 12 HOURS SCHEDULED
Status: DISCONTINUED | OUTPATIENT
Start: 2023-12-26 | End: 2023-12-29 | Stop reason: HOSPADM

## 2023-12-26 RX ORDER — ASPIRIN 81 MG/1
81 TABLET ORAL DAILY
Status: DISCONTINUED | OUTPATIENT
Start: 2023-12-27 | End: 2023-12-29 | Stop reason: HOSPADM

## 2023-12-26 RX ORDER — PANTOPRAZOLE SODIUM 40 MG/1
40 TABLET, DELAYED RELEASE ORAL 2 TIMES DAILY
Status: DISCONTINUED | OUTPATIENT
Start: 2023-12-26 | End: 2023-12-29

## 2023-12-26 RX ORDER — ACETAMINOPHEN 650 MG/1
650 SUPPOSITORY RECTAL EVERY 6 HOURS PRN
Status: DISCONTINUED | OUTPATIENT
Start: 2023-12-26 | End: 2023-12-29 | Stop reason: HOSPADM

## 2023-12-26 RX ORDER — SODIUM CHLORIDE 9 MG/ML
INJECTION, SOLUTION INTRAVENOUS PRN
Status: DISCONTINUED | OUTPATIENT
Start: 2023-12-26 | End: 2023-12-29 | Stop reason: HOSPADM

## 2023-12-26 RX ORDER — ROSUVASTATIN CALCIUM 20 MG/1
40 TABLET, COATED ORAL DAILY
Status: DISCONTINUED | OUTPATIENT
Start: 2023-12-27 | End: 2023-12-29 | Stop reason: HOSPADM

## 2023-12-26 RX ORDER — ONDANSETRON 2 MG/ML
4 INJECTION INTRAMUSCULAR; INTRAVENOUS EVERY 6 HOURS PRN
Status: DISCONTINUED | OUTPATIENT
Start: 2023-12-26 | End: 2023-12-29 | Stop reason: HOSPADM

## 2023-12-26 RX ADMIN — SODIUM CHLORIDE 500 ML: 9 INJECTION, SOLUTION INTRAVENOUS at 20:07

## 2023-12-26 RX ADMIN — SODIUM CHLORIDE 500 ML: 9 INJECTION, SOLUTION INTRAVENOUS at 17:20

## 2023-12-26 RX ADMIN — HYDROMORPHONE HYDROCHLORIDE 0.25 MG: 1 INJECTION, SOLUTION INTRAMUSCULAR; INTRAVENOUS; SUBCUTANEOUS at 17:23

## 2023-12-26 RX ADMIN — FENTANYL CITRATE 50 MCG: 50 INJECTION INTRAMUSCULAR; INTRAVENOUS at 20:08

## 2023-12-26 RX ADMIN — ONDANSETRON 4 MG: 2 INJECTION INTRAMUSCULAR; INTRAVENOUS at 17:23

## 2023-12-26 ASSESSMENT — PAIN DESCRIPTION - LOCATION
LOCATION: ABDOMEN
LOCATION: HEAD;ABDOMEN
LOCATION: HEAD;ABDOMEN

## 2023-12-26 ASSESSMENT — PAIN SCALES - GENERAL
PAINLEVEL_OUTOF10: 7
PAINLEVEL_OUTOF10: 8

## 2023-12-26 ASSESSMENT — PAIN DESCRIPTION - ORIENTATION
ORIENTATION: LOWER

## 2023-12-26 ASSESSMENT — PAIN - FUNCTIONAL ASSESSMENT: PAIN_FUNCTIONAL_ASSESSMENT: 0-10

## 2023-12-26 NOTE — ED PROVIDER NOTES
[facility] EMERGENCY DEPT  EMERGENCY DEPARTMENT ENCOUNTER       Pt Name: Roxanne Swanson  MRN: [id number]  [address] 1970  Date of evaluation: 12/26/2023  Provider: Sujata Marcus MD   PCP: Dakota Rodriguez MD  Note Started: 4:08 PM EST 12/26/23     CHIEF COMPLAINT       Chief Complaint   Patient presents with    Hyperglycemia     Pt c/o elevated BG >400 today at home. pt takes lantus at home. Pt states she has not been consistent with taking her insulin this last week due to N/V. Pt POC Bg in TR is 403        HISTORY OF PRESENT ILLNESS: 1 or more elements      History From: Patient, History limited by: none     Roxanne Swanson is a 47 y.o. female patient with history of type 1 diabetes, chronic kidney disease, endometriosis, here for hyperglycemia. She says that she has had nausea and vomiting for several days. Her glucose has been running greater than 400 during that time. She switched to a newer vial of her insulin today, and now her monitor says her glucose is 312. She has chronic lower abdominal pain due to endometriosis. Says she takes morphine and oxycodone for that. Her abdominal pain is an 8 out of 10 in severity currently. She denies dysuria, but states she has had 4 episodes of diarrhea since yesterday. She was on antibiotics last month for bronchitis. She also has a 7 out of 10 headache which started today. She denies trauma, fever, chest pain or shortness of breath       Please See MDM for Additional Details of the HPI/PMH  Nursing Notes were all reviewed and agreed with or any disagreements were addressed in the HPI. REVIEW OF SYSTEMS        Positives and Pertinent negatives as per HPI.     PAST HISTORY     Past Medical History:  Past Medical History:   Diagnosis Date    Achilles tendon rupture     along with torn right patellar/femoral ligament    Arthritis     rt. foot    Chronic kidney disease     Chronic pain     abdominal pain    Diabetes (HCC)     IDDM on insulin pump and sensor

## 2023-12-26 NOTE — TELEPHONE ENCOUNTER
Jillian,    Please let her know that if her sugar is staying over 500, she needs to go to the ER for IV fluids as I'm concerned she could have an infection and this is making her dehydrated. Thanks!

## 2023-12-26 NOTE — TELEPHONE ENCOUNTER
I called and spoke with Faye Barreto (friend) and he stated thath he dropped Tristen Saenz off 45 minutes ago at home and that she has been having high sugar readings. I informed him that I could not reach her and that I have a message for her from Dr. Bekah Rosales. Faye Barreto confirmed her phone number with me and I informed him that I will reach out to her again. I called patient again and gave her the information noted per Dr. Bekah Rosales. Patient stated that she was in route to the ER by way of ambulance and she asked me to call Faye Barreto back and to notify him and to let Nanci Stanley know what was going on. I called Faye Barreto and gave him this information per Tristen Saenz  and he voiced okay.

## 2023-12-26 NOTE — TELEPHONE ENCOUNTER
12/26/2023  4:06 PM        Pt called and wanted to let Isaíasbety Sylvester know that she is still in the ER and that she would like him to drop off a michele sensor if he could to [facility]. Please advise, Thank you,  [facility]

## 2023-12-26 NOTE — TELEPHONE ENCOUNTER
12/26/2023  1:44 PM        This pt called saying she feels sick to her stomach, she said her metor for her blood sugar is reading 650 for the past couple days now and she would like to know what she should do. Pt stated she doesn't have access to her my chart she says please give her a call.

## 2023-12-27 LAB
ANION GAP SERPL CALC-SCNC: 4 MMOL/L (ref 5–15)
ANION GAP SERPL CALC-SCNC: 6 MMOL/L (ref 5–15)
BASOPHILS # BLD: 0 K/UL (ref 0–0.1)
BASOPHILS NFR BLD: 0 % (ref 0–1)
BUN SERPL-MCNC: 23 MG/DL (ref 6–20)
BUN SERPL-MCNC: 30 MG/DL (ref 6–20)
BUN/CREAT SERPL: 16 (ref 12–20)
BUN/CREAT SERPL: 21 (ref 12–20)
CALCIUM SERPL-MCNC: 8 MG/DL (ref 8.5–10.1)
CALCIUM SERPL-MCNC: 8.7 MG/DL (ref 8.5–10.1)
CHLORIDE SERPL-SCNC: 96 MMOL/L (ref 97–108)
CHLORIDE SERPL-SCNC: 97 MMOL/L (ref 97–108)
CO2 SERPL-SCNC: 28 MMOL/L (ref 21–32)
CO2 SERPL-SCNC: 30 MMOL/L (ref 21–32)
CREAT SERPL-MCNC: 1.41 MG/DL (ref 0.55–1.02)
CREAT SERPL-MCNC: 1.46 MG/DL (ref 0.55–1.02)
DIFFERENTIAL METHOD BLD: ABNORMAL
EOSINOPHIL # BLD: 0.1 K/UL (ref 0–0.4)
EOSINOPHIL NFR BLD: 2 % (ref 0–7)
ERYTHROCYTE [DISTWIDTH] IN BLOOD BY AUTOMATED COUNT: 28.6 % (ref 11.5–14.5)
GLUCOSE BLD STRIP.AUTO-MCNC: 101 MG/DL (ref 65–117)
GLUCOSE BLD STRIP.AUTO-MCNC: 151 MG/DL (ref 65–117)
GLUCOSE BLD STRIP.AUTO-MCNC: 173 MG/DL (ref 65–117)
GLUCOSE BLD STRIP.AUTO-MCNC: 213 MG/DL (ref 65–117)
GLUCOSE BLD STRIP.AUTO-MCNC: 328 MG/DL (ref 65–117)
GLUCOSE BLD STRIP.AUTO-MCNC: 86 MG/DL (ref 65–117)
GLUCOSE SERPL-MCNC: 210 MG/DL (ref 65–100)
GLUCOSE SERPL-MCNC: 350 MG/DL (ref 65–100)
HCT VFR BLD AUTO: 31.9 % (ref 35–47)
HGB BLD-MCNC: 10.5 G/DL (ref 11.5–16)
IMM GRANULOCYTES # BLD AUTO: 0 K/UL (ref 0–0.04)
IMM GRANULOCYTES NFR BLD AUTO: 0 % (ref 0–0.5)
LYMPHOCYTES # BLD: 2.7 K/UL (ref 0.8–3.5)
LYMPHOCYTES NFR BLD: 37 % (ref 12–49)
MCH RBC QN AUTO: 24 PG (ref 26–34)
MCHC RBC AUTO-ENTMCNC: 32.9 G/DL (ref 30–36.5)
MCV RBC AUTO: 73 FL (ref 80–99)
MONOCYTES # BLD: 0.8 K/UL (ref 0–1)
MONOCYTES NFR BLD: 10 % (ref 5–13)
NEUTS SEG # BLD: 3.8 K/UL (ref 1.8–8)
NEUTS SEG NFR BLD: 51 % (ref 32–75)
NRBC # BLD: 0 K/UL (ref 0–0.01)
NRBC BLD-RTO: 0 PER 100 WBC
PLATELET # BLD AUTO: 313 K/UL (ref 150–400)
PMV BLD AUTO: 9.7 FL (ref 8.9–12.9)
POTASSIUM SERPL-SCNC: 4.5 MMOL/L (ref 3.5–5.1)
POTASSIUM SERPL-SCNC: 4.7 MMOL/L (ref 3.5–5.1)
RBC # BLD AUTO: 4.37 M/UL (ref 3.8–5.2)
SERVICE CMNT-IMP: ABNORMAL
SERVICE CMNT-IMP: NORMAL
SERVICE CMNT-IMP: NORMAL
SODIUM SERPL-SCNC: 128 MMOL/L (ref 136–145)
SODIUM SERPL-SCNC: 133 MMOL/L (ref 136–145)
WBC # BLD AUTO: 7.4 K/UL (ref 3.6–11)

## 2023-12-27 PROCEDURE — 85025 COMPLETE CBC W/AUTO DIFF WBC: CPT

## 2023-12-27 PROCEDURE — 6360000002 HC RX W HCPCS: Performed by: STUDENT IN AN ORGANIZED HEALTH CARE EDUCATION/TRAINING PROGRAM

## 2023-12-27 PROCEDURE — 36415 COLL VENOUS BLD VENIPUNCTURE: CPT

## 2023-12-27 PROCEDURE — 6370000000 HC RX 637 (ALT 250 FOR IP): Performed by: STUDENT IN AN ORGANIZED HEALTH CARE EDUCATION/TRAINING PROGRAM

## 2023-12-27 PROCEDURE — 2580000003 HC RX 258: Performed by: STUDENT IN AN ORGANIZED HEALTH CARE EDUCATION/TRAINING PROGRAM

## 2023-12-27 PROCEDURE — 1100000003 HC PRIVATE W/ TELEMETRY

## 2023-12-27 PROCEDURE — 80048 BASIC METABOLIC PNL TOTAL CA: CPT

## 2023-12-27 PROCEDURE — 82962 GLUCOSE BLOOD TEST: CPT

## 2023-12-27 RX ORDER — 0.9 % SODIUM CHLORIDE 0.9 %
500 INTRAVENOUS SOLUTION INTRAVENOUS ONCE
Status: COMPLETED | OUTPATIENT
Start: 2023-12-27 | End: 2023-12-27

## 2023-12-27 RX ORDER — SODIUM CHLORIDE 9 MG/ML
INJECTION, SOLUTION INTRAVENOUS CONTINUOUS
Status: ACTIVE | OUTPATIENT
Start: 2023-12-27 | End: 2023-12-28

## 2023-12-27 RX ADMIN — SODIUM CHLORIDE: 9 INJECTION, SOLUTION INTRAVENOUS at 09:36

## 2023-12-27 RX ADMIN — ENOXAPARIN SODIUM 40 MG: 100 INJECTION SUBCUTANEOUS at 08:30

## 2023-12-27 RX ADMIN — INSULIN LISPRO 2 UNITS: 100 INJECTION, SOLUTION INTRAVENOUS; SUBCUTANEOUS at 08:30

## 2023-12-27 RX ADMIN — OXYCODONE HYDROCHLORIDE 5 MG: 5 TABLET ORAL at 18:33

## 2023-12-27 RX ADMIN — BUSPIRONE HYDROCHLORIDE 15 MG: 5 TABLET ORAL at 13:15

## 2023-12-27 RX ADMIN — OXYCODONE HYDROCHLORIDE 5 MG: 5 TABLET ORAL at 13:15

## 2023-12-27 RX ADMIN — SODIUM CHLORIDE: 9 INJECTION, SOLUTION INTRAVENOUS at 18:33

## 2023-12-27 RX ADMIN — INSULIN LISPRO 6 UNITS: 100 INJECTION, SOLUTION INTRAVENOUS; SUBCUTANEOUS at 13:03

## 2023-12-27 RX ADMIN — BUSPIRONE HYDROCHLORIDE 15 MG: 5 TABLET ORAL at 20:52

## 2023-12-27 RX ADMIN — SODIUM CHLORIDE, PRESERVATIVE FREE 10 ML: 5 INJECTION INTRAVENOUS at 08:15

## 2023-12-27 RX ADMIN — SODIUM CHLORIDE: 9 INJECTION, SOLUTION INTRAVENOUS at 00:40

## 2023-12-27 RX ADMIN — BUSPIRONE HYDROCHLORIDE 15 MG: 5 TABLET ORAL at 07:59

## 2023-12-27 RX ADMIN — ROSUVASTATIN 40 MG: 20 TABLET, FILM COATED ORAL at 08:30

## 2023-12-27 RX ADMIN — SODIUM CHLORIDE, PRESERVATIVE FREE 10 ML: 5 INJECTION INTRAVENOUS at 20:53

## 2023-12-27 RX ADMIN — SODIUM CHLORIDE 500 ML: 9 INJECTION, SOLUTION INTRAVENOUS at 13:00

## 2023-12-27 RX ADMIN — ASPIRIN 81 MG: 81 TABLET, COATED ORAL at 08:14

## 2023-12-27 RX ADMIN — INSULIN LISPRO 4 UNITS: 100 INJECTION, SOLUTION INTRAVENOUS; SUBCUTANEOUS at 20:52

## 2023-12-27 RX ADMIN — PANTOPRAZOLE SODIUM 40 MG: 40 TABLET, DELAYED RELEASE ORAL at 09:40

## 2023-12-27 RX ADMIN — PANTOPRAZOLE SODIUM 40 MG: 40 TABLET, DELAYED RELEASE ORAL at 20:52

## 2023-12-27 RX ADMIN — CITALOPRAM 40 MG: 20 TABLET, FILM COATED ORAL at 13:01

## 2023-12-27 RX ADMIN — OXYCODONE HYDROCHLORIDE 5 MG: 5 TABLET ORAL at 07:58

## 2023-12-27 RX ADMIN — ONDANSETRON 4 MG: 2 INJECTION INTRAMUSCULAR; INTRAVENOUS at 07:59

## 2023-12-27 RX ADMIN — POTASSIUM BICARBONATE 40 MEQ: 782 TABLET, EFFERVESCENT ORAL at 09:40

## 2023-12-27 ASSESSMENT — PAIN - FUNCTIONAL ASSESSMENT: PAIN_FUNCTIONAL_ASSESSMENT: PREVENTS OR INTERFERES SOME ACTIVE ACTIVITIES AND ADLS

## 2023-12-27 ASSESSMENT — PAIN SCALES - GENERAL
PAINLEVEL_OUTOF10: 8
PAINLEVEL_OUTOF10: 0
PAINLEVEL_OUTOF10: 7
PAINLEVEL_OUTOF10: 6
PAINLEVEL_OUTOF10: 8
PAINLEVEL_OUTOF10: 8

## 2023-12-27 ASSESSMENT — PAIN DESCRIPTION - DESCRIPTORS: DESCRIPTORS: ACHING;DISCOMFORT

## 2023-12-27 ASSESSMENT — PAIN DESCRIPTION - ORIENTATION: ORIENTATION: RIGHT

## 2023-12-27 ASSESSMENT — PAIN DESCRIPTION - LOCATION
LOCATION: ABDOMEN
LOCATION: ABDOMEN

## 2023-12-27 NOTE — ED NOTES
Assumed care of pt. White board updated. Plan of care discussed. Call bell in reach. Will continue to monitor. [id number] Report given to [facility].

## 2023-12-27 NOTE — TELEPHONE ENCOUNTER
Called and spoke with pt. She states she will be admitted overnight to give her more fluid and watch her gallbladder. She is feeling a lot better at this time. Erin Ying was able to find more sensors so she doesn't need one brought to the ER. She states she did have the flu a week ago and was given antibiotics and thinks this caused her diarrhea. She was having high sugars over 400-600 the past few days and it's possible she had a bad vial of novolog and switched this out today and her sugars had gone down to the 150s this afternoon in the ER. I told her I am on call this week if anything is needed. she voiced understanding of this plan.

## 2023-12-27 NOTE — ED NOTES
Patient activated call bell, she wanted to let us know she had dropped her saltine crackers on the floor. Patient was educated on a clear liquid diet. Patient states she is starving and does not want to be on a clear liquid diet.

## 2023-12-27 NOTE — H&P
Hospitalist Admission Note    NAME:   Shari Jolley   : 1970   MRN: [id number]     Date/Time: 2023 8:37 PM    Patient PCP: Starla Lyons MD    ______________________________________________________________________  Given the patient's current clinical presentation, I have a high level of concern for decompensation if discharged from the emergency department. Complex decision making was performed, which includes reviewing the patient's available past medical records, laboratory results, and x-ray films. My assessment of this patient's clinical condition and my plan of care is as follows. Assessment / Plan:    Diarrhea  No tenderness on abdominal auscultation  CT abdomen without contrast showed cholelithiasis without acute cholecystitis  Check enteric panel, C. difficile  IV fluids  Monitor electrolytes    Acute on chronic kidney disease  Hyponatremia  Creatinine 1.9 baseline 1.3  Likely prerenal in the setting of diarrhea  IV fluids overnight  Repeat in a.m. Diabetes mellitus  Sliding scale plus Lantus  Monitor blood sugars    GERD  Continue pantoprazole    Anxiety  PTSD  Continue buspirone, Celexa, Rexulti    Hypertension  Hold Bumex, spironolactone with PERLA and volume depletion      Medical Decision Making:   I personally reviewed labs: Yes  I personally reviewed imaging: CT abdomen  I personally reviewed EKG:  Toxic drug monitoring: Monitor for fluid overload with IV fluids  Discussed case with: ED provider. After discussion I am in agreement that acuity of patient's medical condition necessitates hospital stay. Code Status: Full  DVT Prophylaxis: Lovenox  Baseline: Independent from home    Subjective:   CHIEF COMPLAINT: Diarrhea    HISTORY OF PRESENT ILLNESS:     Jocelyn Calhoun is a 47 y.o.  female with PMHx significant as below presented with diarrhea and vomiting for several days. She had several episodes of vomiting 2 days back, which seems to have resolved.   She has 26.0 - 34.0 PG    MCHC 32.0 30.0 - 36.5 g/dL    RDW 29.0 (H) 11.5 - 14.5 %    Platelets 394 150 - 400 K/uL    MPV 9.4 8.9 - 12.9 FL    Nucleated RBCs 0.0 0  WBC    nRBC 0.00 0.00 - 0.01 K/uL    Neutrophils % 57 32 - 75 %    Lymphocytes % 32 12 - 49 %    Monocytes % 10 5 - 13 %    Eosinophils % 1 0 - 7 %    Basophils % 0 0 - 1 %    Immature Granulocytes 0 0.0 - 0.5 %    Neutrophils Absolute 5.1 1.8 - 8.0 K/UL    Lymphocytes Absolute 2.8 0.8 - 3.5 K/UL    Monocytes Absolute 0.9 0.0 - 1.0 K/UL    Eosinophils Absolute 0.1 0.0 - 0.4 K/UL    Basophils Absolute 0.0 0.0 - 0.1 K/UL    Absolute Immature Granulocyte 0.0 0.00 - 0.04 K/UL    Differential Type SMEAR SCANNED      RBC Comment ANISOCYTOSIS  PRESENT        RBC Comment MICROCYTOSIS  PRESENT       Comprehensive Metabolic Panel    Collection Time: 12/26/23  5:29 PM   Result Value Ref Range    Sodium 128 (L) 136 - 145 mmol/L    Potassium 3.9 3.5 - 5.1 mmol/L    Chloride 90 (L) 97 - 108 mmol/L    CO2 34 (H) 21 - 32 mmol/L    Anion Gap 4 (L) 5 - 15 mmol/L    Glucose 153 (H) 65 - 100 mg/dL    BUN 32 (H) 6 - 20 MG/DL    Creatinine 1.98 (H) 0.55 - 1.02 MG/DL    Bun/Cre Ratio 16 12 - 20      Est, Glom Filt Rate 30 (L) >60 ml/min/1.73m2    Calcium 9.7 8.5 - 10.1 MG/DL    Total Bilirubin 0.2 0.2 - 1.0 MG/DL    ALT 24 12 - 78 U/L    AST 23 15 - 37 U/L    Alk Phosphatase 119 (H) 45 - 117 U/L    Total Protein 8.1 6.4 - 8.2 g/dL    Albumin 3.7 3.5 - 5.0 g/dL    Globulin 4.4 (H) 2.0 - 4.0 g/dL    Albumin/Globulin Ratio 0.8 (L) 1.1 - 2.2     Lipase    Collection Time: 12/26/23  5:29 PM   Result Value Ref Range    Lipase 9 (L) 13 - 75 U/L         CT ABDOMEN PELVIS WO CONTRAST Additional Contrast? None    Result Date: 12/26/2023  EXAM: CT ABDOMEN PELVIS WO CONTRAST INDICATION: pain/vomiting/diarrhea COMPARISON: CT abdomen pelvis April 25, 2023 IV CONTRAST: None. ORAL CONTRAST: None TECHNIQUE: Thin axial images were obtained through the abdomen and pelvis. Coronal and sagittal

## 2023-12-28 LAB
ANION GAP SERPL CALC-SCNC: 3 MMOL/L (ref 5–15)
BUN SERPL-MCNC: 21 MG/DL (ref 6–20)
BUN/CREAT SERPL: 18 (ref 12–20)
CALCIUM SERPL-MCNC: 8.2 MG/DL (ref 8.5–10.1)
CHLORIDE SERPL-SCNC: 99 MMOL/L (ref 97–108)
CO2 SERPL-SCNC: 28 MMOL/L (ref 21–32)
CREAT SERPL-MCNC: 1.16 MG/DL (ref 0.55–1.02)
ERYTHROCYTE [DISTWIDTH] IN BLOOD BY AUTOMATED COUNT: 28.9 % (ref 11.5–14.5)
GLUCOSE BLD STRIP.AUTO-MCNC: 110 MG/DL (ref 65–117)
GLUCOSE BLD STRIP.AUTO-MCNC: 318 MG/DL (ref 65–117)
GLUCOSE BLD STRIP.AUTO-MCNC: 329 MG/DL (ref 65–117)
GLUCOSE BLD STRIP.AUTO-MCNC: 520 MG/DL (ref 65–117)
GLUCOSE BLD STRIP.AUTO-MCNC: 573 MG/DL (ref 65–117)
GLUCOSE SERPL-MCNC: 235 MG/DL (ref 65–100)
HCT VFR BLD AUTO: 31.6 % (ref 35–47)
HGB BLD-MCNC: 10 G/DL (ref 11.5–16)
MCH RBC QN AUTO: 23.6 PG (ref 26–34)
MCHC RBC AUTO-ENTMCNC: 31.6 G/DL (ref 30–36.5)
MCV RBC AUTO: 74.7 FL (ref 80–99)
NRBC # BLD: 0 K/UL (ref 0–0.01)
NRBC BLD-RTO: 0 PER 100 WBC
PLATELET # BLD AUTO: 302 K/UL (ref 150–400)
PMV BLD AUTO: 9.5 FL (ref 8.9–12.9)
POTASSIUM SERPL-SCNC: 4.4 MMOL/L (ref 3.5–5.1)
RBC # BLD AUTO: 4.23 M/UL (ref 3.8–5.2)
SERVICE CMNT-IMP: ABNORMAL
SERVICE CMNT-IMP: NORMAL
SODIUM SERPL-SCNC: 130 MMOL/L (ref 136–145)
WBC # BLD AUTO: 6.7 K/UL (ref 3.6–11)

## 2023-12-28 PROCEDURE — 6370000000 HC RX 637 (ALT 250 FOR IP): Performed by: STUDENT IN AN ORGANIZED HEALTH CARE EDUCATION/TRAINING PROGRAM

## 2023-12-28 PROCEDURE — 2580000003 HC RX 258: Performed by: INTERNAL MEDICINE

## 2023-12-28 PROCEDURE — 6370000000 HC RX 637 (ALT 250 FOR IP): Performed by: INTERNAL MEDICINE

## 2023-12-28 PROCEDURE — 82962 GLUCOSE BLOOD TEST: CPT

## 2023-12-28 PROCEDURE — 36415 COLL VENOUS BLD VENIPUNCTURE: CPT

## 2023-12-28 PROCEDURE — 1100000003 HC PRIVATE W/ TELEMETRY

## 2023-12-28 PROCEDURE — 2580000003 HC RX 258: Performed by: STUDENT IN AN ORGANIZED HEALTH CARE EDUCATION/TRAINING PROGRAM

## 2023-12-28 PROCEDURE — 85027 COMPLETE CBC AUTOMATED: CPT

## 2023-12-28 PROCEDURE — 6360000002 HC RX W HCPCS: Performed by: STUDENT IN AN ORGANIZED HEALTH CARE EDUCATION/TRAINING PROGRAM

## 2023-12-28 PROCEDURE — 80048 BASIC METABOLIC PNL TOTAL CA: CPT

## 2023-12-28 RX ORDER — ALPRAZOLAM 0.5 MG/1
1 TABLET ORAL 4 TIMES DAILY
Status: DISCONTINUED | OUTPATIENT
Start: 2023-12-28 | End: 2023-12-29 | Stop reason: HOSPADM

## 2023-12-28 RX ORDER — SODIUM CHLORIDE 9 MG/ML
INJECTION, SOLUTION INTRAVENOUS CONTINUOUS
Status: ACTIVE | OUTPATIENT
Start: 2023-12-28 | End: 2023-12-28

## 2023-12-28 RX ORDER — INSULIN LISPRO 100 [IU]/ML
0-16 INJECTION, SOLUTION INTRAVENOUS; SUBCUTANEOUS
Status: DISCONTINUED | OUTPATIENT
Start: 2023-12-28 | End: 2023-12-29 | Stop reason: HOSPADM

## 2023-12-28 RX ORDER — MORPHINE SULFATE 30 MG/1
90 TABLET, FILM COATED, EXTENDED RELEASE ORAL 2 TIMES DAILY
Status: DISCONTINUED | OUTPATIENT
Start: 2023-12-28 | End: 2023-12-29 | Stop reason: HOSPADM

## 2023-12-28 RX ORDER — INSULIN LISPRO 100 [IU]/ML
0-4 INJECTION, SOLUTION INTRAVENOUS; SUBCUTANEOUS NIGHTLY
Status: DISCONTINUED | OUTPATIENT
Start: 2023-12-28 | End: 2023-12-29 | Stop reason: HOSPADM

## 2023-12-28 RX ORDER — UMECLIDINIUM BROMIDE AND VILANTEROL TRIFENATATE 62.5; 25 UG/1; UG/1
1 POWDER RESPIRATORY (INHALATION) DAILY
COMMUNITY

## 2023-12-28 RX ORDER — ESOMEPRAZOLE MAGNESIUM 40 MG/1
40 CAPSULE, DELAYED RELEASE ORAL
COMMUNITY

## 2023-12-28 RX ORDER — DEXTROSE MONOHYDRATE 100 MG/ML
INJECTION, SOLUTION INTRAVENOUS CONTINUOUS PRN
Status: DISCONTINUED | OUTPATIENT
Start: 2023-12-28 | End: 2023-12-29 | Stop reason: HOSPADM

## 2023-12-28 RX ORDER — INSULIN GLARGINE 100 [IU]/ML
20 INJECTION, SOLUTION SUBCUTANEOUS DAILY
Status: DISCONTINUED | OUTPATIENT
Start: 2023-12-28 | End: 2023-12-29 | Stop reason: HOSPADM

## 2023-12-28 RX ADMIN — INSULIN LISPRO 11 UNITS: 100 INJECTION, SOLUTION INTRAVENOUS; SUBCUTANEOUS at 11:31

## 2023-12-28 RX ADMIN — SODIUM CHLORIDE: 9 INJECTION, SOLUTION INTRAVENOUS at 14:34

## 2023-12-28 RX ADMIN — ALPRAZOLAM 1 MG: 0.5 TABLET ORAL at 11:30

## 2023-12-28 RX ADMIN — BREXPIPRAZOLE 2 MG: 1 TABLET ORAL at 09:02

## 2023-12-28 RX ADMIN — ASPIRIN 81 MG: 81 TABLET, COATED ORAL at 08:32

## 2023-12-28 RX ADMIN — PANTOPRAZOLE SODIUM 40 MG: 40 TABLET, DELAYED RELEASE ORAL at 20:44

## 2023-12-28 RX ADMIN — MORPHINE SULFATE 90 MG: 30 TABLET, FILM COATED, EXTENDED RELEASE ORAL at 22:07

## 2023-12-28 RX ADMIN — INSULIN GLARGINE 20 UNITS: 100 INJECTION, SOLUTION SUBCUTANEOUS at 08:34

## 2023-12-28 RX ADMIN — BUSPIRONE HYDROCHLORIDE 15 MG: 5 TABLET ORAL at 20:44

## 2023-12-28 RX ADMIN — ALPRAZOLAM 1 MG: 0.5 TABLET ORAL at 20:45

## 2023-12-28 RX ADMIN — CITALOPRAM 40 MG: 20 TABLET, FILM COATED ORAL at 08:31

## 2023-12-28 RX ADMIN — INSULIN LISPRO 4 UNITS: 100 INJECTION, SOLUTION INTRAVENOUS; SUBCUTANEOUS at 20:45

## 2023-12-28 RX ADMIN — MORPHINE SULFATE 90 MG: 30 TABLET, FILM COATED, EXTENDED RELEASE ORAL at 14:30

## 2023-12-28 RX ADMIN — ALPRAZOLAM 1 MG: 0.5 TABLET ORAL at 17:24

## 2023-12-28 RX ADMIN — SODIUM CHLORIDE, PRESERVATIVE FREE 10 ML: 5 INJECTION INTRAVENOUS at 20:46

## 2023-12-28 RX ADMIN — BUSPIRONE HYDROCHLORIDE 15 MG: 5 TABLET ORAL at 08:32

## 2023-12-28 RX ADMIN — INSULIN LISPRO 2 UNITS: 100 INJECTION, SOLUTION INTRAVENOUS; SUBCUTANEOUS at 08:33

## 2023-12-28 RX ADMIN — OXYCODONE HYDROCHLORIDE 5 MG: 5 TABLET ORAL at 08:32

## 2023-12-28 RX ADMIN — SODIUM CHLORIDE, PRESERVATIVE FREE 10 ML: 5 INJECTION INTRAVENOUS at 08:35

## 2023-12-28 RX ADMIN — ROSUVASTATIN 40 MG: 20 TABLET, FILM COATED ORAL at 08:32

## 2023-12-28 RX ADMIN — PANTOPRAZOLE SODIUM 40 MG: 40 TABLET, DELAYED RELEASE ORAL at 08:32

## 2023-12-28 RX ADMIN — ENOXAPARIN SODIUM 40 MG: 100 INJECTION SUBCUTANEOUS at 08:34

## 2023-12-28 RX ADMIN — BUSPIRONE HYDROCHLORIDE 15 MG: 5 TABLET ORAL at 14:29

## 2023-12-28 ASSESSMENT — PAIN DESCRIPTION - LOCATION: LOCATION: ABDOMEN

## 2023-12-28 ASSESSMENT — PAIN DESCRIPTION - DESCRIPTORS: DESCRIPTORS: ACHING;CRAMPING

## 2023-12-28 ASSESSMENT — PAIN DESCRIPTION - PAIN TYPE: TYPE: CHRONIC PAIN

## 2023-12-28 ASSESSMENT — PAIN SCALES - GENERAL
PAINLEVEL_OUTOF10: 8
PAINLEVEL_OUTOF10: 0

## 2023-12-28 ASSESSMENT — PAIN - FUNCTIONAL ASSESSMENT: PAIN_FUNCTIONAL_ASSESSMENT: PREVENTS OR INTERFERES SOME ACTIVE ACTIVITIES AND ADLS

## 2023-12-28 ASSESSMENT — PAIN DESCRIPTION - ORIENTATION: ORIENTATION: MID

## 2023-12-28 NOTE — PROGRESS NOTES
Hospitalist Progress Note    NAME:   Teresa Hu   : 1970   MRN: [id number]     Date/Time: 2023 1:42 PM  Patient PCP: Shalom Delcid MD    Estimated discharge date:    Barriers: Improvement of blood sugars      Assessment / Plan:    Diarrhea resolved  No tenderness on abdominal auscultation  CT abdomen without contrast showed cholelithiasis without acute cholecystitis  Diarrhea has completely resolved so we will discontinue stool studies  Electrolytes are normal       Acute on chronic kidney disease  -Presented with a creatinine of 1.98  -Creatinine is trending down and is 1.1    Hyponatremia  -Sodium is improving and is 130. Continue IV fluids. Check BMP in a.m. Diabetes mellitus type II uncontrolled with hyperglycemia  -Noted information from Dr. Daily Smith note  -Hemoglobin A1c on  was 8.0  -Continue home Lantus 20 units daily and she got a dose this morning. Changed to high-dose sliding scale insulin.  -Will start diabetic diet  -Blood sugars are not controlled with above regimen, will reach out to Dr. Juan F Chao       GERD  Continue pantoprazole     Anxiety  PTSD  Continue buspirone, Celexa, Rexulti and also Xanax     Hypertension  Hold Bumex, spironolactone with PERLA and volume depletion    Chronic pain  -Continue PTA morphine with close monitoring of respiratory status      Medical Decision Making:   I personally reviewed labs: CBC, BMP  I personally reviewed imaging:  I personally reviewed EKG:yes  Toxic drug monitoring: yes  Discussed case with: Pharmacy,         Code Status: Full code  DVT Prophylaxis: Lovenox  GI Prophylaxis: Protonix    Subjective:     Chief Complaint / Reason for Physician Visit  She does not report any diarrhea, abdominal pain, nausea or vomiting  Blood sugars are high this morning  Overnight events noted      Objective:     VITALS:   Last 24hrs VS reviewed since prior progress note.  Most recent are:  Patient Vitals for the past 24 hrs:   BP Temp Temp src Pulse Resp SpO2 Height Weight   12/28/23 1100 -- -- -- -- -- -- 1.651 m (5' 5\") 74.4 kg (164 lb 0.4 oz)   12/28/23 0907 131/75 98.6 °F (37 °C) -- 69 16 95 % -- --   12/28/23 0342 126/74 97.9 °F (36.6 °C) Oral 73 16 98 % -- --   12/27/23 2050 -- -- -- -- -- 92 % -- --   12/27/23 2035 119/69 98.2 °F (36.8 °C) Oral 69 18 (!) 89 % -- --   12/27/23 1833 -- -- -- -- 16 -- -- --   12/27/23 1414 129/83 98 °F (36.7 °C) Oral 68 17 99 % -- --         No intake or output data in the 24 hours ending 12/28/23 1342       I had a face to face encounter and independently examined this patient on 12/28/2023, as outlined below:  PHYSICAL EXAM:  General: Alert, cooperative  EENT:  EOMI. Anicteric sclerae.  Resp:  CTA bilaterally, no wheezing or rales.  No accessory muscle use  CV:  Regular  rhythm,  No edema  GI:  Soft, Non distended, Non tender.  +Bowel sounds  Neurologic:  Alert and awake, normal speech,   Psych:   Good insight. Not anxious nor agitated  Skin:  No rashes.  No jaundice    Reviewed most current lab test results and cultures  YES  Reviewed most current radiology test results   YES  Review and summation of old records today    NO  Reviewed patient's current orders and MAR    YES  PMH/SH reviewed - no change compared to H&P    Procedures: see electronic medical records for all procedures/Xrays and details which were not copied into this note but were reviewed prior to creation of Plan.      LABS:  I reviewed today's most current labs and imaging studies.  Pertinent labs include:  Recent Labs     12/26/23  1729 12/27/23  0821 12/28/23  0404   WBC 8.9 7.4 6.7   HGB 12.0 10.5* 10.0*   HCT 37.5 31.9* 31.6*    313 302       Recent Labs     12/26/23  1729 12/27/23  0821 12/27/23  2221 12/28/23  0404   * 133* 128* 130*   K 3.9 4.5 4.7 4.4   CL 90* 97 96* 99   CO2 34* 30 28 28   GLUCOSE 153* 210* 350* 235*   BUN 32* 30* 23* 21*   CREATININE 1.98* 1.46* 1.41* 1.16*   CALCIUM 9.7 8.7 8.0* 8.2*

## 2023-12-28 NOTE — PROGRESS NOTES
Pharmacy Medication Reconciliation     The patient was interviewed regarding current PTA medication list, use and drug allergies. The patient was questioned regarding use of any other inhalers, topical products, over the counter medications, herbal medications, vitamin products or ophthalmic/nasal/otic medication use. Allergy Update: Metolazone, Simvastatin, Lisinopril, and Methylprednisolone    Recommendations/Findings: The following amendments were made to the patient's active medication list on file at [facility]:   1) Additions:   Anoro ellipta inhaler     2) Deletions: none    3) Changes: none    Pertinent Findings:   The patient stated she takes a potassium supplement but does not know the dosage. No recent fills for potassium were found on RxQuery. The patient stated she takes morphine ER along with oxycodone for breakthrough pain caused by endometriosis. Clarified PTA med list with the patient and RxQuery. The patient also provided a medication list from [address] [place] [address] that was printed on 11/21/23; however, there were several differences between the patient's PTA med list on file and this list. Discussed these differences with the patient and verified all prescription medications on the list below with RxQuery. PTA medication list was corrected to the following:     Prior to Admission Medications   Prescriptions   ALPRAZolam (XANAX) 2 MG tablet   Sig: Take 1 tablet by mouth.  4 times a daily   Blood Glucose Monitoring Suppl (TRUE METRIX METER) PATRICIA   Sig: Test 10 times daily Dx Code: E10.65   Cholecalciferol (VITAMIN D3) 125 MCG (5000 UT) TABS   Sig: Take 1 tablet by mouth daily   DULoxetine (CYMBALTA) 20 MG extended release capsule   Sig: Take 1 cap at bedtime for nerve pain and depression--replaces gabapentin (please take off her list)   Glucagon, rDNA, (GLUCAGON EMERGENCY) 1 MG KIT   Sig: USE AS DIRECTED IN KIT INSTRUCTIONS   Insulin Pen Needle (B-D ULTRAFINE III SHORT PEN) 31G X 8 MM MISC   Sig: Use symptoms, alternating nostrils.   oxyCODONE (ROXICODONE) 5 MG immediate release tablet   Sig: Take 1 tablet by mouth every 4 hours as needed.   rosuvastatin (CRESTOR) 40 MG tablet   Sig: Take 1 tablet by mouth daily   spironolactone (ALDACTONE) 50 MG tablet   Sig: Take 1 tab daily   umeclidinium-vilanterol (ANORO ELLIPTA) 62.5-25 MCG/ACT inhaler   Sig: Inhale 1 puff into the lungs daily      Facility-Administered Medications: None        Thank you,  Isabell Westbrook, [facility]

## 2023-12-28 NOTE — PROGRESS NOTES
Patient with blood glucose of 328 informed the NP no changes. Given 4 units. Bedside, Verbal, and Recorded shift change report given to [facility] (oncoming nurse) by aNdeen Chapa (offgoing nurse). Report included the following information MAR, Recent Results, and Med Rec Status.

## 2023-12-28 NOTE — PROGRESS NOTES
Pt wants to leave AMA. I discussed with her the risks including death and requested her to stay. I also called her boy friend and requested him to talk to the pt.

## 2023-12-28 NOTE — PROGRESS NOTES
Endocrinology note    This is just a courtesy note as I had recommended patient come to the ER on Tuesday and it does not appear she had any lantus ordered yesterday even though the hospitalist note states she should be receiving lantus as part of the plan. Without basal insulin she can go into DKA as she has type 1 diabetes so I ordered her home dose of 20 units in the morning as listed in my plan from my outpatient note two weeks  ago. Thanks for your help with her. Send me a message through perfect serve with any questions.      Dr Marcellus Madison

## 2023-12-28 NOTE — PROGRESS NOTES
Patient was observed self medicating self in her room. Patient was administering insulin to self. Patient education was provided on safety and reasons can not use home medications in hospital. She became agitated and began verbally abusing nurse. Redirection was not effective. Nurse confiscated insulin needles, 2 bottles humalog, Zanax, cigarettes, and 4 lighters. Property was placed in Safety closet and medications are in patient medication BIN MD was notified. Toro Brar

## 2023-12-28 NOTE — ADT AUTH CERT
12/28  by Shanae Narvaez, RN  Last Updated by Shanae Narvaez, RN on 12/28/2023 1453     Review Status Created By   In Primary Shanae Narvaez RN       Review Type   --      Criteria Review   12/28      PERTINENT UPDATES:  Pt wants to leave AMA. I discussed with her the risks including death and requested her to stay. I also called her boy friend and requested him to talk to the pt.        Estimated discharge date: 12/30   Barriers: Improvement of blood sugars  She does not report any diarrhea, abdominal pain, nausea or vomiting  Blood sugars are high this morning  Overnight events noted     VITALS:  98.6 (37) SPO2: 95  RR: 16 HR: 69 BP: 131/75         ABNL/PERTINENT LABS/RADIOLOGY/DIAGNOSTIC STUDIES:    Latest Reference Range & Units 12/28/23 04:04 12/28/23 07:44 12/28/23 10:12 12/28/23 11:12   Sodium 136 - 145 mmol/L 130 (L)         Potassium 3.5 - 5.1 mmol/L 4.4         Chloride 97 - 108 mmol/L 99         CO2 21 - 32 mmol/L 28         BUN,BUNPL 6 - 20 MG/DL 21 (H)         Creatinine 0.55 - 1.02 MG/DL 1.16 (H)         Bun/Cre Ratio 12 - 20   18         Anion Gap 5 - 15 mmol/L 3 (L)         Est, Glom Filt Rate >60 ml/min/1.73m2 57 (L)         Glucose, Random 65 - 100 mg/dL 235 (H)         POC Glucose 65 - 117 mg/dL   329 (H) 520 (H) 573 (H)   CALCIUM, SERUM, 153545 8.5 - 10.1 MG/DL 8.2 (L)         WBC 3.6 - 11.0 K/uL 6.7         RBC 3.80 - 5.20 M/uL 4.23         Hemoglobin Quant 11.5 - 16.0 g/dL 10.0 (L)         Hematocrit 35.0 - 47.0 % 31.6 (L)         MCV 80.0 - 99.0 FL 74.7 (L)         MCH 26.0 - 34.0 PG 23.6 (L)         MCHC 30.0 - 36.5 g/dL 31.6         MPV 8.9 - 12.9 FL 9.5         RDW 11.5 - 14.5 % 28.9 (H)         Platelet Count 150 - 400 K/uL 302         (L): Data is abnormally low  (H): Data is abnormally high     PHYSICAL EXAM:  General:          Alert, cooperative  EENT:              EOMI. Anicteric sclerae.  Resp:               CTA bilaterally, no wheezing or rales.  No accessory muscle  cholecystitis  F/u enteric panel, C. difficile  IV fluids  Monitor electrolytes     Acute on chronic kidney disease  Hyponatremia  Creatinine 1.9>2   baseline 1.3  Likely prerenal in the setting of diarrhea  Continue with IV fluid  Repeat in a.m.   If worsen consider nephrology consult     Diabetes mellitus  Sliding scale plus Lantus  Monitor blood sugars     GERD  Continue pantoprazole     Anxiety  PTSD  Continue buspirone, Celexa, Rexulti     Hypertension  Hold Bumex, spironolactone with PERLA and volume depletion        Medical Decision Making:   I personally reviewed labs:yes  I personally reviewed imaging:yes  I personally reviewed EKG:yes  Toxic drug monitoring: yes  Discussed case with: MICHAEL, RN, IDR           Code Status: Full code  DVT Prophylaxis: Lovenox  GI Prophylaxis: Protonix     MEDICATIONS:  aspirin EC tablet 81 mg  Dose: 81 mg  Freq: DAILY Route: PO  Start: 12/27/23 0900  busPIRone (BUSPAR) tablet 15 mg  Dose: 15 mg  Freq: 3 TIMES DAILY Route: PO  Start: 12/26/23 2100  citalopram (CELEXA) tablet 40 mg  Dose: 40 mg  Freq: DAILY Route: PO  Start: 12/27/23 0900  enoxaparin (LOVENOX) injection 40 mg  Dose: 40 mg  Freq: DAILY Route: SC  Start: 12/27/23 0900  pantoprazole (PROTONIX) tablet 40 mg  Dose: 40 mg  Freq: 2 TIMES DAILY Route: PO  Start: 12/26/23 2100  potassium bicarb-citric acid (EFFER-K) effervescent tablet 40 mEq  Dose: 40 mEq  Freq: ONCE Route: PO  Start: 12/27/23 0855 End: 12/27/23 0940  rosuvastatin (CRESTOR) tablet 40 mg  Dose: 40 mg  Freq: DAILY Route: PO  Start: 12/27/23 0900  sodium chloride 0.9 % bolus 500 mL  Dose: 500 mL  Freq: ONCE Route: IV  Last Dose: Stopped (12/27/23 1447)  Start: 12/27/23 1248 End: 12/27/23 1447  0.9 % sodium chloride infusion  Rate: 75 mL/hr Freq: CONTINUOUS Route: IV  Start: 12/27/23 0854 End: 12/28/23 0853  oxyCODONE (ROXICODONE) immediate release tablet 5 mg  Dose: 5 mg  Freq: EVERY 4 HOURS PRN Route: PO  PRN Reason: Pain Severe (7-10)  Start: 12/26/23 2037 x3

## 2023-12-29 ENCOUNTER — TELEPHONE (OUTPATIENT)
Age: 53
End: 2023-12-29

## 2023-12-29 VITALS
SYSTOLIC BLOOD PRESSURE: 136 MMHG | HEART RATE: 202 BPM | BODY MASS INDEX: 27.33 KG/M2 | TEMPERATURE: 97.7 F | HEIGHT: 65 IN | RESPIRATION RATE: 18 BRPM | OXYGEN SATURATION: 99 % | WEIGHT: 164.02 LBS | DIASTOLIC BLOOD PRESSURE: 67 MMHG

## 2023-12-29 LAB
ANION GAP SERPL CALC-SCNC: 5 MMOL/L (ref 5–15)
BUN SERPL-MCNC: 17 MG/DL (ref 6–20)
BUN/CREAT SERPL: 15 (ref 12–20)
CALCIUM SERPL-MCNC: 8.6 MG/DL (ref 8.5–10.1)
CHLORIDE SERPL-SCNC: 96 MMOL/L (ref 97–108)
CO2 SERPL-SCNC: 26 MMOL/L (ref 21–32)
CREAT SERPL-MCNC: 1.17 MG/DL (ref 0.55–1.02)
ERYTHROCYTE [DISTWIDTH] IN BLOOD BY AUTOMATED COUNT: 27.9 % (ref 11.5–14.5)
GLUCOSE BLD STRIP.AUTO-MCNC: 222 MG/DL (ref 65–117)
GLUCOSE BLD STRIP.AUTO-MCNC: 376 MG/DL (ref 65–117)
GLUCOSE SERPL-MCNC: 314 MG/DL (ref 65–100)
HCT VFR BLD AUTO: 33 % (ref 35–47)
HGB BLD-MCNC: 10.5 G/DL (ref 11.5–16)
MCH RBC QN AUTO: 23.4 PG (ref 26–34)
MCHC RBC AUTO-ENTMCNC: 31.8 G/DL (ref 30–36.5)
MCV RBC AUTO: 73.5 FL (ref 80–99)
NRBC # BLD: 0 K/UL (ref 0–0.01)
NRBC BLD-RTO: 0 PER 100 WBC
PLATELET # BLD AUTO: 287 K/UL (ref 150–400)
PMV BLD AUTO: 9.4 FL (ref 8.9–12.9)
POTASSIUM SERPL-SCNC: 4.6 MMOL/L (ref 3.5–5.1)
RBC # BLD AUTO: 4.49 M/UL (ref 3.8–5.2)
SERVICE CMNT-IMP: ABNORMAL
SERVICE CMNT-IMP: ABNORMAL
SODIUM SERPL-SCNC: 127 MMOL/L (ref 136–145)
WBC # BLD AUTO: 6.5 K/UL (ref 3.6–11)

## 2023-12-29 PROCEDURE — 2580000003 HC RX 258: Performed by: STUDENT IN AN ORGANIZED HEALTH CARE EDUCATION/TRAINING PROGRAM

## 2023-12-29 PROCEDURE — 82962 GLUCOSE BLOOD TEST: CPT

## 2023-12-29 PROCEDURE — 36415 COLL VENOUS BLD VENIPUNCTURE: CPT

## 2023-12-29 PROCEDURE — 80048 BASIC METABOLIC PNL TOTAL CA: CPT

## 2023-12-29 PROCEDURE — 85027 COMPLETE CBC AUTOMATED: CPT

## 2023-12-29 PROCEDURE — 6370000000 HC RX 637 (ALT 250 FOR IP): Performed by: STUDENT IN AN ORGANIZED HEALTH CARE EDUCATION/TRAINING PROGRAM

## 2023-12-29 PROCEDURE — 6370000000 HC RX 637 (ALT 250 FOR IP): Performed by: INTERNAL MEDICINE

## 2023-12-29 PROCEDURE — 6360000002 HC RX W HCPCS: Performed by: STUDENT IN AN ORGANIZED HEALTH CARE EDUCATION/TRAINING PROGRAM

## 2023-12-29 RX ORDER — LANOLIN ALCOHOL/MO/W.PET/CERES
400 CREAM (GRAM) TOPICAL DAILY
Status: DISCONTINUED | OUTPATIENT
Start: 2023-12-29 | End: 2023-12-29 | Stop reason: HOSPADM

## 2023-12-29 RX ORDER — DULOXETIN HYDROCHLORIDE 20 MG/1
20 CAPSULE, DELAYED RELEASE ORAL
Status: DISCONTINUED | OUTPATIENT
Start: 2023-12-29 | End: 2023-12-29 | Stop reason: HOSPADM

## 2023-12-29 RX ORDER — INSULIN LISPRO 100 [IU]/ML
5 INJECTION, SOLUTION INTRAVENOUS; SUBCUTANEOUS
Status: DISCONTINUED | OUTPATIENT
Start: 2023-12-29 | End: 2023-12-29 | Stop reason: HOSPADM

## 2023-12-29 RX ORDER — VITAMIN B COMPLEX
5000 TABLET ORAL DAILY
Status: DISCONTINUED | OUTPATIENT
Start: 2023-12-29 | End: 2023-12-29 | Stop reason: HOSPADM

## 2023-12-29 RX ORDER — BUTALBITAL, ACETAMINOPHEN AND CAFFEINE 50; 325; 40 MG/1; MG/1; MG/1
1 TABLET ORAL EVERY 6 HOURS PRN
Status: DISCONTINUED | OUTPATIENT
Start: 2023-12-29 | End: 2023-12-29 | Stop reason: HOSPADM

## 2023-12-29 RX ORDER — METOCLOPRAMIDE 10 MG/1
5 TABLET ORAL
Status: DISCONTINUED | OUTPATIENT
Start: 2023-12-29 | End: 2023-12-29 | Stop reason: HOSPADM

## 2023-12-29 RX ORDER — ARFORMOTEROL TARTRATE 15 UG/2ML
15 SOLUTION RESPIRATORY (INHALATION)
Status: DISCONTINUED | OUTPATIENT
Start: 2023-12-29 | End: 2023-12-29 | Stop reason: HOSPADM

## 2023-12-29 RX ORDER — PANTOPRAZOLE SODIUM 40 MG/1
40 TABLET, DELAYED RELEASE ORAL
Status: DISCONTINUED | OUTPATIENT
Start: 2023-12-30 | End: 2023-12-29 | Stop reason: HOSPADM

## 2023-12-29 RX ADMIN — OXYCODONE HYDROCHLORIDE 5 MG: 5 TABLET ORAL at 12:04

## 2023-12-29 RX ADMIN — ASPIRIN 81 MG: 81 TABLET, COATED ORAL at 09:00

## 2023-12-29 RX ADMIN — Medication 400 MG: at 09:12

## 2023-12-29 RX ADMIN — METOCLOPRAMIDE 5 MG: 10 TABLET ORAL at 14:59

## 2023-12-29 RX ADMIN — ROSUVASTATIN 40 MG: 20 TABLET, FILM COATED ORAL at 09:00

## 2023-12-29 RX ADMIN — BUSPIRONE HYDROCHLORIDE 15 MG: 5 TABLET ORAL at 09:00

## 2023-12-29 RX ADMIN — BUSPIRONE HYDROCHLORIDE 15 MG: 5 TABLET ORAL at 14:59

## 2023-12-29 RX ADMIN — ALPRAZOLAM 1 MG: 0.5 TABLET ORAL at 12:04

## 2023-12-29 RX ADMIN — CITALOPRAM 40 MG: 20 TABLET, FILM COATED ORAL at 08:59

## 2023-12-29 RX ADMIN — SODIUM CHLORIDE, PRESERVATIVE FREE 10 ML: 5 INJECTION INTRAVENOUS at 09:01

## 2023-12-29 RX ADMIN — ALPRAZOLAM 1 MG: 0.5 TABLET ORAL at 09:00

## 2023-12-29 RX ADMIN — OXYCODONE HYDROCHLORIDE 5 MG: 5 TABLET ORAL at 06:01

## 2023-12-29 RX ADMIN — BREXPIPRAZOLE 2 MG: 1 TABLET ORAL at 09:20

## 2023-12-29 RX ADMIN — MORPHINE SULFATE 90 MG: 30 TABLET, FILM COATED, EXTENDED RELEASE ORAL at 09:00

## 2023-12-29 RX ADMIN — BUMETANIDE 2 MG: 1 TABLET ORAL at 09:12

## 2023-12-29 RX ADMIN — ENOXAPARIN SODIUM 40 MG: 100 INJECTION SUBCUTANEOUS at 08:59

## 2023-12-29 RX ADMIN — INSULIN LISPRO 5 UNITS: 100 INJECTION, SOLUTION INTRAVENOUS; SUBCUTANEOUS at 09:00

## 2023-12-29 RX ADMIN — INSULIN GLARGINE 20 UNITS: 100 INJECTION, SOLUTION SUBCUTANEOUS at 08:59

## 2023-12-29 RX ADMIN — INSULIN LISPRO 16 UNITS: 100 INJECTION, SOLUTION INTRAVENOUS; SUBCUTANEOUS at 08:58

## 2023-12-29 RX ADMIN — METOCLOPRAMIDE 5 MG: 10 TABLET ORAL at 09:13

## 2023-12-29 RX ADMIN — Medication 5000 UNITS: at 09:12

## 2023-12-29 ASSESSMENT — PAIN DESCRIPTION - LOCATION: LOCATION: ABDOMEN

## 2023-12-29 ASSESSMENT — PAIN DESCRIPTION - DESCRIPTORS: DESCRIPTORS: ACHING;DISCOMFORT

## 2023-12-29 ASSESSMENT — PAIN SCALES - GENERAL
PAINLEVEL_OUTOF10: 8
PAINLEVEL_OUTOF10: 0

## 2023-12-29 NOTE — CARE COORDINATION
12/29/23 1458   Discharge Planning   Patient expects to be discharged to: [facility]   Transition of Care Consult (CM Consult) Other  (Follow up with PCP on 1/11/2023 at 2:45 pm per patient)   Confirm Follow Up Transport Friends     Patients friend to transport to home - patient states she has made her follow up appts with PCP and specialist.  Discharge today.   BARBRA Garcia

## 2023-12-29 NOTE — DISCHARGE SUMMARY
Discharge Summary    Name: Lupe Arevalo  [id number]  YOB: 1970 (Age: 47 y.o.)   Date of Admission: 12/26/2023  Date of Discharge: 12/29/2023  Attending Physician: Lisa Pena MD    Discharge Diagnosis:     Diarrhea resolved  Acute on chronic kidney disease  Hyponatremia  Diabetes mellitus type II uncontrolled with hyperglycemia  GERD  Anxiety  PTSD  Hypertension  Chronic pain    Consultations:  IP CONSULT TO PHARMACY      Brief Admission History/Reason for Admission Per Aaron Montes De Oca MD:   Annie Cabrera is a 47 y.o.  female with PMHx significant as below presented with diarrhea and vomiting for several days. She had several episodes of vomiting 2 days back, which seems to have resolved. She has been having loose bowel movements, watery for last 2 days. She mentions having some antibiotics 1 month back for acute bronchitis. She denies any fever, chills, abdominal pain, chest pain, change in urinary habits, lower extremity edema or tenderness. Brief Hospital Course by Main Problems:     Diarrhea resolved  No tenderness on abdominal auscultation  CT abdomen without contrast showed cholelithiasis without acute cholecystitis  Diarrhea has completely resolved so we will discontinue stool studies  Electrolytes are normal        Acute on chronic kidney disease  -Presented with a creatinine of 1.98  -Creatinine is trending down and is 1.1     Hyponatremia  -Corrected sodium is within normal limits        Diabetes mellitus type II uncontrolled with hyperglycemia  -Noted information from Dr. James Willams note.   Will continue on her home regimen of Lantus 20 units along with insulin sliding scale 5 units 3 times daily plus sliding scale per Dr. James Willams recommendation        GERD  Continue pantoprazole     Anxiety  PTSD  Continue buspirone, Celexa, Rexulti and also Xanax     Hypertension  Hold Bumex, spironolactone with PERLA and volume depletion     Chronic 20 units in the AM--Dose change 12/12/23--updated med list--did not send prescription to the pharmacy     Magnesium 400 MG Tabs     metoclopramide 5 MG tablet  Commonly known as: REGLAN  TAKE 1 TABLET BY MOUTH EVERY DAY BEFORE BREAKFAST AND DINNER     Mirena (52 MG) IUD 52 mg  Generic drug: levonorgestrel     Morphine Sulfate  MG T12a     Multivitamin Tabs     naloxone 4 MG/0.1ML Liqd nasal spray     NovoLOG 100 UNIT/ML injection vial  Generic drug: insulin aspart  Inject 5 units before meals + 1 units for every 50 mg/dl above 150 mg/dl--max 33 units/day--Dose change 12/12/23--updated med list--did not send prescription to the pharmacy     oxyCODONE 5 MG immediate release tablet  Commonly known as: ROXICODONE     RELION GLUCOSE TEST STRIPS strip  Generic drug: blood glucose test strips  TEST 10 TIMES DAILY DUE TO FLUCTUATING BLOOD SUGARS     Rexulti 1 MG Tabs tablet  Generic drug: brexpiprazole     rosuvastatin 40 MG tablet  Commonly known as: CRESTOR     spironolactone 50 MG tablet  Commonly known as: ALDACTONE     True Metrix Meter Liz  Test 10 times daily Dx Code: E10.65     Vitamin D3 125 MCG (5000 UT) Tabs                  DISPOSITION:    Home with Family: y      Home with HH/PT/OT/RN:    SNF/LTC:    TARAN:    OTHER:            Code status: Full    1. Recommended diet: Diabetic     2. Recommended activity: activity as tolerated    3. If you experience any of the following symptoms then please call your primary care physician or return to the emergency room if you cannot get hold of your doctor:    4. Wound Care: none     5. Lab work: cbc and bmp on 1/2/24, check blood sugar 3 times per day     6. Stop smocking    7.Bring these papers with you to your follow up appointments. The papers will help your doctors be sure to continue the care plan from the hospital.        Follow up with:   PCP : Jair Vieira MD Kowalski, John P, MD  [address] [address] [phone]    Schedule an

## 2023-12-29 NOTE — TELEPHONE ENCOUNTER
I spoke with pt's attending Dr. Tomas Stanley at 1:30pm and told him that I am comfortable with Naeem Quevedo being discharged today and also fine with her next appointment with me being on 1/25/24 and to keep her insulin the same as per my last note and he said he would do so. I just spoke with Naeem Emy and she confirmed she has been discharged and her blood sugar is doing much better and currently is under 200.   She knows I am on call until the morning of 1/2/24 if anything is needed over the weekend and I'm fine with her f/u appt being kept where it is on 1/25/24 at 10:30am.

## 2023-12-29 NOTE — PROGRESS NOTES
Attempted to schedule PCP hospital follow up appointment. Unable to reach anyone, unable to leave voicemail. PCP office is closed at this time per voice recording. Pending patient discharge.  Judd Perez, Care Management Assistant

## 2023-12-29 NOTE — TELEPHONE ENCOUNTER
Pt called stating she just got discharged from the hospital and would like a sooner appt. Patient states her blood sugar taken with freestyle michele yesterday was 400 and she states hospital staff did not give her Lantus insulin all day yesterday. Patient states the hospital recommends that patient is seen for a sooner appt. Would this be okay?

## 2023-12-29 NOTE — ACP (ADVANCE CARE PLANNING)
Advance Care Planning     General Advance Care Planning (ACP) Conversation    Date of Conversation: 12/26/2023  Conducted with: Patient with Decision Making Capacity   Healthcare Decision Maker: Named in Advance Directive or Healthcare Power of  (name) Jordan Mabry Decision Maker:    Primary Decision Maker: Taylorbrigette Tobias - Boyfriend - [phone]    Secondary Decision Maker: Francois Herrera - Parent - [phone]  Click here to complete Healthcare Decision Makers including selection of the Healthcare Decision Maker Relationship (ie \"Primary\"). Today we documented Decision Maker(s) consistent with ACP documents on file. Content/Action Overview:   Has ACP document(s) on file - reflects the patient's care preferences  Reviewed DNR/DNI and patient elects Full Code (Attempt Resuscitation)         Length of Voluntary ACP Conversation in minutes:  <16 minutes (Non-Billable)    Nadia Toribio RN

## 2023-12-29 NOTE — PROGRESS NOTES
Endocrinology progress note    Her blood sugars remain elevated despite starting lantus as she needs prandial humalog so I have ordered her home dose of 5 units before meals. Will continue lantus 20 units daily for now. Thanks. Dr Azael Moreno.

## 2023-12-29 NOTE — DISCHARGE INSTRUCTIONS
Patient Discharge Instructions    Lavonne Rincon / [id number] : 1970    Admitted 2023 Discharged: 2023         DISCHARGE DIAGNOSIS:   Diarrhea resolved  Acute on chronic kidney disease  Hyponatremia  Diabetes mellitus type II uncontrolled with hyperglycemia  GERD  Anxiety  PTSD  Hypertension  Chronic pain      Take Home Medications     {Medication reconciliation information is now added to the patient's AVS automatically when it is printed. There is no need to use this SmartLink in discharge instructions. Highlight this text and delete it to clear this message}      General drug facts     If you have a very bad allergy, wear an allergy ID at all times. It is important that you take the medication exactly as they are prescribed. Keep your medication in the bottles provided by the pharmacist.  Keep a list of all your drugs (prescription, natural products, vitamins, OTC) with you. Give this list to your doctor. Do not take other medications without consulting your doctor. Do not share your drugs with others and do not take anyone else's drugs. Keep all drugs out of the reach of children and pets. Most drugs may be thrown away in household trash after mixing with coffee grounds or arcadio litter and sealing in a plastic bag. Keep a list Call your doctor for help with any side effects. If in the [place] you may also call the FDA at 3-236-NSW-8295    Talk with the doctor before starting any new drug, including OTC, natural products, or vitamins. What to do at Home    1. Recommended diet: Diabetic     2. Recommended activity: activity as tolerated    3. If you experience any of the following symptoms then please call your primary care physician or return to the emergency room if you cannot get hold of your doctor:    4. Wound Care: none     5. Lab work: cbc and bmp on 24, check blood sugar 3 times per day     6. Stop smocking    7. Bring these papers with you to your follow up

## 2023-12-29 NOTE — TELEPHONE ENCOUNTER
12/29/2023  11:13 AM        Pt called stating she just got discharged from the hospital and would like a sooner appt.  Please advise, Thank you,  [facility]
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
## 2024-01-02 ENCOUNTER — TELEPHONE (OUTPATIENT)
Age: 54
End: 2024-01-02

## 2024-01-02 NOTE — TELEPHONE ENCOUNTER
1/2/2024  1:29 PM      Patient called and stated her fasting blood sugar was 620 this morning and she took it just now and it is 312.    Pt#[phone]    Thanks,  Josie Lo

## 2024-01-02 NOTE — TELEPHONE ENCOUNTER
Pt states yesterday she woke up with a blood sugar of 640. She increased her Lantus to 22 units and stated she was fine. Pt states this AM her blood glucose was 650 so she increased her Lantus to 24 units. Pt states she checked an hour ago and her glucose was 312. She states she is taking all meds as prescribed and would like to know if she should increase her Lantus.   Informed pt that Dr Schmitt is out of the office until 01/08/2024 and the message will be passed to his partner Dr العراقي. Pt verbalized understanding and stated she will wait for instructions.

## 2024-01-02 NOTE — TELEPHONE ENCOUNTER
Please have patient take additional Novolog 5 units now and to check her blood sugar again in 2 hours. If blood sugar continues to be above 300 have her take another 3 units of Novolog and repeat her blood sugar in another 2 hours. She can continue with her previous insulin doses as per Dr Schmitt's instructions and to call us back if she continues to have elevated blood sugars.

## 2024-01-05 ENCOUNTER — TELEPHONE (OUTPATIENT)
Age: 54
End: 2024-01-05

## 2024-01-05 RX ORDER — INSULIN GLARGINE 100 [IU]/ML
INJECTION, SOLUTION SUBCUTANEOUS
Qty: 10 ML | Refills: 11
Start: 2024-01-05

## 2024-01-05 NOTE — TELEPHONE ENCOUNTER
Pt LVM stating she had a bad batch of Novolog. She states she was able to  new vials from her pharmacy and her blood sugar went from 680 to 108. She called back today and stated Lantus 26 units is the magic dose. She states her blood glucose this AM was 96. She stated she wanted to let Dr Schmitt know.

## 2024-01-10 DIAGNOSIS — E10.65 TYPE 1 DIABETES MELLITUS WITH HYPERGLYCEMIA (HCC): ICD-10-CM

## 2024-01-10 RX ORDER — PEN NEEDLE, DIABETIC 31 GX5/16"
NEEDLE, DISPOSABLE MISCELLANEOUS
Qty: 400 EACH | Refills: 3 | Status: SHIPPED | OUTPATIENT
Start: 2024-01-10

## 2024-01-10 NOTE — TELEPHONE ENCOUNTER
Pt LVM stating she needs a refill on pen needles and syringes. She also wanted to let Dr Schmitt know that she is currently up to 30 units of Lantus. She states she has an appt to see her PCP tomorrow to see if she can have labs drawn. Pt states she may have an infection due to her sugars running high again. She says this AM her blood glucose was 258 and had been as high as the 500's during the day.

## 2024-01-29 ENCOUNTER — APPOINTMENT (OUTPATIENT)
Facility: HOSPITAL | Age: 54
End: 2024-01-29
Payer: MEDICARE

## 2024-01-29 ENCOUNTER — OFFICE VISIT (OUTPATIENT)
Age: 54
End: 2024-01-29
Payer: MEDICARE

## 2024-01-29 ENCOUNTER — HOSPITAL ENCOUNTER (OUTPATIENT)
Facility: HOSPITAL | Age: 54
Setting detail: OBSERVATION
Discharge: HOME | End: 2024-01-29
Attending: STUDENT IN AN ORGANIZED HEALTH CARE EDUCATION/TRAINING PROGRAM | Admitting: STUDENT IN AN ORGANIZED HEALTH CARE EDUCATION/TRAINING PROGRAM
Payer: MEDICARE

## 2024-01-29 VITALS
OXYGEN SATURATION: 82 % | WEIGHT: 171 LBS | SYSTOLIC BLOOD PRESSURE: 111 MMHG | BODY MASS INDEX: 28.49 KG/M2 | HEART RATE: 104 BPM | HEIGHT: 65 IN | DIASTOLIC BLOOD PRESSURE: 57 MMHG

## 2024-01-29 VITALS
HEART RATE: 75 BPM | DIASTOLIC BLOOD PRESSURE: 57 MMHG | SYSTOLIC BLOOD PRESSURE: 102 MMHG | TEMPERATURE: 99.6 F | OXYGEN SATURATION: 98 % | RESPIRATION RATE: 20 BRPM

## 2024-01-29 DIAGNOSIS — I10 ESSENTIAL (PRIMARY) HYPERTENSION: ICD-10-CM

## 2024-01-29 DIAGNOSIS — R94.6 ABNORMAL RESULTS OF THYROID FUNCTION STUDIES: ICD-10-CM

## 2024-01-29 DIAGNOSIS — E10.65 TYPE 1 DIABETES MELLITUS WITH HYPERGLYCEMIA (HCC): Primary | ICD-10-CM

## 2024-01-29 DIAGNOSIS — R79.89 ELEVATED LFTS: ICD-10-CM

## 2024-01-29 DIAGNOSIS — E78.2 MIXED HYPERLIPIDEMIA: ICD-10-CM
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
## 2024-01-29 DIAGNOSIS — Z99.81 REQUIRES OXYGEN THERAPY: Primary | ICD-10-CM

## 2024-01-29 PROBLEM — J96.11 CHRONIC HYPOXEMIC RESPIRATORY FAILURE (HCC): Status: ACTIVE | Noted: 2024-01-29

## 2024-01-29 PROBLEM — J96.11 CHRONIC HYPOXIC RESPIRATORY FAILURE (HCC): Status: ACTIVE | Noted: 2024-01-29

## 2024-01-29 LAB
GLUCOSE BLD STRIP.AUTO-MCNC: 80 MG/DL (ref 65–117)
SERVICE CMNT-IMP: NORMAL

## 2024-01-29 PROCEDURE — 3046F HEMOGLOBIN A1C LEVEL >9.0%: CPT | Performed by: INTERNAL MEDICINE

## 2024-01-29 PROCEDURE — 94640 AIRWAY INHALATION TREATMENT: CPT

## 2024-01-29 PROCEDURE — G8417 CALC BMI ABV UP PARAM F/U: HCPCS | Performed by: INTERNAL MEDICINE

## 2024-01-29 PROCEDURE — G8484 FLU IMMUNIZE NO ADMIN: HCPCS | Performed by: INTERNAL MEDICINE

## 2024-01-29 PROCEDURE — 2700000000 HC OXYGEN THERAPY PER DAY

## 2024-01-29 PROCEDURE — 3078F DIAST BP <80 MM HG: CPT | Performed by: INTERNAL MEDICINE

## 2024-01-29 PROCEDURE — 6360000002 HC RX W HCPCS: Performed by: STUDENT IN AN ORGANIZED HEALTH CARE EDUCATION/TRAINING PROGRAM

## 2024-01-29 PROCEDURE — 99214 OFFICE O/P EST MOD 30 MIN: CPT | Performed by: INTERNAL MEDICINE

## 2024-01-29 PROCEDURE — 70450 CT HEAD/BRAIN W/O DYE: CPT

## 2024-01-29 PROCEDURE — G0378 HOSPITAL OBSERVATION PER HR: HCPCS

## 2024-01-29 PROCEDURE — 6370000000 HC RX 637 (ALT 250 FOR IP): Performed by: STUDENT IN AN ORGANIZED HEALTH CARE EDUCATION/TRAINING PROGRAM

## 2024-01-29 PROCEDURE — 3074F SYST BP LT 130 MM HG: CPT | Performed by: INTERNAL MEDICINE

## 2024-01-29 PROCEDURE — 96372 THER/PROPH/DIAG INJ SC/IM: CPT

## 2024-01-29 PROCEDURE — 3017F COLORECTAL CA SCREEN DOC REV: CPT | Performed by: INTERNAL MEDICINE

## 2024-01-29 PROCEDURE — 73562 X-RAY EXAM OF KNEE 3: CPT

## 2024-01-29 PROCEDURE — G8427 DOCREV CUR MEDS BY ELIG CLIN: HCPCS | Performed by: INTERNAL MEDICINE

## 2024-01-29 PROCEDURE — G2211 COMPLEX E/M VISIT ADD ON: HCPCS | Performed by: INTERNAL MEDICINE

## 2024-01-29 PROCEDURE — 2022F DILAT RTA XM EVC RTNOPTHY: CPT | Performed by: INTERNAL MEDICINE

## 2024-01-29 PROCEDURE — 99285 EMERGENCY DEPT VISIT HI MDM: CPT

## 2024-01-29 PROCEDURE — 95251 CONT GLUC MNTR ANALYSIS I&R: CPT | Performed by: INTERNAL MEDICINE

## 2024-01-29 PROCEDURE — 4004F PT TOBACCO SCREEN RCVD TLK: CPT | Performed by: INTERNAL MEDICINE

## 2024-01-29 PROCEDURE — 82962 GLUCOSE BLOOD TEST: CPT

## 2024-01-29 PROCEDURE — 72125 CT NECK SPINE W/O DYE: CPT

## 2024-01-29 RX ORDER — CITALOPRAM 20 MG/1
40 TABLET ORAL DAILY
Status: DISCONTINUED | OUTPATIENT
Start: 2024-01-30 | End: 2024-01-30 | Stop reason: HOSPADM

## 2024-01-29 RX ORDER — INSULIN LISPRO 100 [IU]/ML
0-8 INJECTION, SOLUTION INTRAVENOUS; SUBCUTANEOUS
Status: DISCONTINUED | OUTPATIENT
Start: 2024-01-29 | End: 2024-01-30 | Stop reason: HOSPADM

## 2024-01-29 RX ORDER — SODIUM CHLORIDE 0.9 % (FLUSH) 0.9 %
5-40 SYRINGE (ML) INJECTION EVERY 12 HOURS SCHEDULED
Status: DISCONTINUED | OUTPATIENT
Start: 2024-01-29 | End: 2024-01-30 | Stop reason: HOSPADM

## 2024-01-29 RX ORDER — BUTALBITAL, ACETAMINOPHEN AND CAFFEINE 50; 325; 40 MG/1; MG/1; MG/1
1 TABLET ORAL EVERY 6 HOURS PRN
Status: DISCONTINUED | OUTPATIENT
Start: 2024-01-29 | End: 2024-01-30 | Stop reason: HOSPADM

## 2024-01-29 RX ORDER — POLYETHYLENE GLYCOL 3350 17 G/17G
17 POWDER, FOR SOLUTION ORAL DAILY PRN
Status: DISCONTINUED | OUTPATIENT
Start: 2024-01-29 | End: 2024-01-30 | Stop reason: HOSPADM

## 2024-01-29 RX ORDER — ROSUVASTATIN CALCIUM 20 MG/1
40 TABLET, COATED ORAL DAILY
Status: DISCONTINUED | OUTPATIENT
Start: 2024-01-29 | End: 2024-01-30 | Stop reason: HOSPADM

## 2024-01-29 RX ORDER — ACETAMINOPHEN 325 MG/1
650 TABLET ORAL
Status: COMPLETED | OUTPATIENT
Start: 2024-01-29 | End: 2024-01-29

## 2024-01-29 RX ORDER — PANTOPRAZOLE SODIUM 40 MG/1
40 TABLET, DELAYED RELEASE ORAL
Status: DISCONTINUED | OUTPATIENT
Start: 2024-01-30 | End: 2024-01-29

## 2024-01-29 RX ORDER — ONDANSETRON 4 MG/1
4 TABLET, ORALLY DISINTEGRATING ORAL EVERY 8 HOURS PRN
Status: DISCONTINUED | OUTPATIENT
Start: 2024-01-29 | End: 2024-01-30 | Stop reason: HOSPADM

## 2024-01-29 RX ORDER — MORPHINE SULFATE 15 MG/1
100 TABLET, FILM COATED, EXTENDED RELEASE ORAL 2 TIMES DAILY
Status: DISCONTINUED | OUTPATIENT
Start: 2024-01-29 | End: 2024-01-30 | Stop reason: HOSPADM

## 2024-01-29 RX ORDER — INSULIN GLARGINE 100 [IU]/ML
10 INJECTION, SOLUTION SUBCUTANEOUS DAILY
Status: DISCONTINUED | OUTPATIENT
Start: 2024-01-30 | End: 2024-01-30 | Stop reason: HOSPADM

## 2024-01-29 RX ORDER — INSULIN LISPRO 100 [IU]/ML
0-4 INJECTION, SOLUTION INTRAVENOUS; SUBCUTANEOUS NIGHTLY
Status: DISCONTINUED | OUTPATIENT
Start: 2024-01-29 | End: 2024-01-30 | Stop reason: HOSPADM

## 2024-01-29 RX ORDER — IBUPROFEN 400 MG/1
400 TABLET ORAL
Status: DISCONTINUED | OUTPATIENT
Start: 2024-01-29 | End: 2024-01-29

## 2024-01-29 RX ORDER — ACETAMINOPHEN 325 MG/1
650 TABLET ORAL EVERY 6 HOURS PRN
Status: DISCONTINUED | OUTPATIENT
Start: 2024-01-29 | End: 2024-01-30 | Stop reason: HOSPADM

## 2024-01-29 RX ORDER — ARFORMOTEROL TARTRATE 15 UG/2ML
15 SOLUTION RESPIRATORY (INHALATION)
Status: DISCONTINUED | OUTPATIENT
Start: 2024-01-29 | End: 2024-01-30 | Stop reason: HOSPADM

## 2024-01-29 RX ORDER — SODIUM CHLORIDE 0.9 % (FLUSH) 0.9 %
5-40 SYRINGE (ML) INJECTION PRN
Status: DISCONTINUED | OUTPATIENT
Start: 2024-01-29 | End: 2024-01-30 | Stop reason: HOSPADM

## 2024-01-29 RX ORDER — ALPRAZOLAM 0.5 MG/1
2 TABLET ORAL 4 TIMES DAILY PRN
Status: DISCONTINUED | OUTPATIENT
Start: 2024-01-29 | End: 2024-01-30 | Stop reason: HOSPADM

## 2024-01-29 RX ORDER — OXYCODONE HYDROCHLORIDE 5 MG/1
5 TABLET ORAL
Status: COMPLETED | OUTPATIENT
Start: 2024-01-29 | End: 2024-01-29

## 2024-01-29 RX ORDER — ACETAMINOPHEN 650 MG/1
650 SUPPOSITORY RECTAL EVERY 6 HOURS PRN
Status: DISCONTINUED | OUTPATIENT
Start: 2024-01-29 | End: 2024-01-30 | Stop reason: HOSPADM

## 2024-01-29 RX ORDER — OXYCODONE HYDROCHLORIDE 5 MG/1
5 TABLET ORAL EVERY 4 HOURS PRN
Status: DISCONTINUED | OUTPATIENT
Start: 2024-01-29 | End: 2024-01-30 | Stop reason: HOSPADM

## 2024-01-29 RX ORDER — DEXTROSE MONOHYDRATE 100 MG/ML
INJECTION, SOLUTION INTRAVENOUS CONTINUOUS PRN
Status: DISCONTINUED | OUTPATIENT
Start: 2024-01-29 | End: 2024-01-30 | Stop reason: HOSPADM

## 2024-01-29 RX ORDER — FAMOTIDINE 20 MG/1
40 TABLET, FILM COATED ORAL
Status: DISCONTINUED | OUTPATIENT
Start: 2024-01-29 | End: 2024-01-30 | Stop reason: HOSPADM

## 2024-01-29 RX ORDER — SODIUM CHLORIDE 9 MG/ML
INJECTION, SOLUTION INTRAVENOUS PRN
Status: DISCONTINUED | OUTPATIENT
Start: 2024-01-29 | End: 2024-01-30 | Stop reason: HOSPADM

## 2024-01-29 RX ORDER — ENOXAPARIN SODIUM 100 MG/ML
40 INJECTION SUBCUTANEOUS EVERY 24 HOURS
Status: DISCONTINUED | OUTPATIENT
Start: 2024-01-29 | End: 2024-01-30 | Stop reason: HOSPADM

## 2024-01-29 RX ORDER — BUSPIRONE HYDROCHLORIDE 10 MG/1
15 TABLET ORAL 3 TIMES DAILY
Status: DISCONTINUED | OUTPATIENT
Start: 2024-01-29 | End: 2024-01-30 | Stop reason: HOSPADM

## 2024-01-29 RX ORDER — ASPIRIN 81 MG/1
81 TABLET ORAL DAILY
Status: DISCONTINUED | OUTPATIENT
Start: 2024-01-29 | End: 2024-01-30 | Stop reason: HOSPADM

## 2024-01-29 RX ORDER — ONDANSETRON 2 MG/ML
4 INJECTION INTRAMUSCULAR; INTRAVENOUS EVERY 6 HOURS PRN
Status: DISCONTINUED | OUTPATIENT
Start: 2024-01-29 | End: 2024-01-30 | Stop reason: HOSPADM

## 2024-01-29 RX ORDER — LANOLIN ALCOHOL/MO/W.PET/CERES
3 CREAM (GRAM) TOPICAL NIGHTLY PRN
Status: DISCONTINUED | OUTPATIENT
Start: 2024-01-29 | End: 2024-01-30 | Stop reason: HOSPADM

## 2024-01-29 RX ADMIN — ACETAMINOPHEN 650 MG: 325 TABLET ORAL at 16:09

## 2024-01-29 RX ADMIN — FAMOTIDINE 40 MG: 20 TABLET, FILM COATED ORAL at 20:35

## 2024-01-29 RX ADMIN — BUSPIRONE HYDROCHLORIDE 15 MG: 10 TABLET ORAL at 20:35

## 2024-01-29 RX ADMIN — ASPIRIN 81 MG: 81 TABLET, COATED ORAL at 19:52

## 2024-01-29 RX ADMIN — OXYCODONE 5 MG: 5 TABLET ORAL at 16:47

## 2024-01-29 RX ADMIN — ROSUVASTATIN CALCIUM 40 MG: 20 TABLET, FILM COATED ORAL at 19:52

## 2024-01-29 RX ADMIN — ENOXAPARIN SODIUM 40 MG: 100 INJECTION SUBCUTANEOUS at 19:52

## 2024-01-29 RX ADMIN — ARFORMOTEROL TARTRATE 15 MCG: 15 SOLUTION RESPIRATORY (INHALATION) at 21:06

## 2024-01-29 RX ADMIN — MORPHINE SULFATE 105 MG: 15 TABLET, FILM COATED, EXTENDED RELEASE ORAL at 20:35

## 2024-01-29 NOTE — ED PROVIDER NOTES
[facility] EMERGENCY DEPT  EMERGENCY DEPARTMENT ENCOUNTER       Pt Name: Anette Cannon  MRN: [id number]  Birthdate 1970  Date of evaluation: 1/29/2024  Provider: Marcos Stinson MD   PCP: Jair Vieira MD  Note Started: 3:26 PM EST 1/29/24     CHIEF COMPLAINT       Chief Complaint   Patient presents with    Fall     Patient arrives via EMS from home. Per EMS, patient's home oxygen ran out, oxygen saturation 60% upon EMS arrival. Pt wears 3L at baseline for COPD. Patient also reports falling twice today, reports neck pain. C-collar in place upon arrival by EMS. Pt also has a hx of neuropathy. Oxygen saturation 96% on 3L during triage        HISTORY OF PRESENT ILLNESS: 1 or more elements      History From: Patient  HPI Limitations: None     Anette Cannon is a 53 y.o. female who presents after ground-level fall at home.  Patient wears oxygen at all times and states that her oxygen concentrator failed and left her without oxygen.  She has no backup oxygen.  She states that while she is waiting for EMS, she fell and hit her head and her knee.  No recent illnesses.  She reports some neck pain and knee pain but denies pain to her arms, chest, low back, abdomen.  No medications taken prior to arrival.  She is requesting 1 for pain.  Patient placed on oxygen by EMS, states that she wears 2 to 3 L at all times.         Nursing Notes were all reviewed and agreed with or any disagreements were addressed in the HPI.     REVIEW OF SYSTEMS      Review of Systems     Positives and Pertinent negatives as per HPI.    PAST HISTORY     Past Medical History:  Past Medical History:   Diagnosis Date    Achilles tendon rupture     along with torn right patellar/femoral ligament    Arthritis     rt. foot    Chronic kidney disease     Chronic pain     abdominal pain    Diabetes (HCC)     IDDM on insulin pump and sensor    DM type 1 (diabetes mellitus, type 1) ([facility])     age 22    Endometriosis     s/p ex-lap 4x    Gastric ulcer

## 2024-01-29 NOTE — PATIENT INSTRUCTIONS
1) Stay on the lantus 20 units daily for now while active to avoid lows but when your activity is less, I would take 24 units daily while not active.    2) Stay on the same scale for novolog for now.

## 2024-01-29 NOTE — PROGRESS NOTES
pravastatin at this time and previously was on 10 mg daily.   in 1/14.  Up to 167 in 4/14 so started lovastatin 20 mg daily but she couldn't afford this.  She has been started on crestor 40 mg daily by crossover clinic and LDL 64 in 10/14 and 55 in 1/15.  Was 37 in 3/16 so dose decreased to 20 mg daily and LDL 23 in 2/17.  Was changed to lipitor 1/2 of 80 mg daily when clinic couldn't get crestor any longer and LDL 37 in 9/17.  This was stopped during her hospital stay in 11/17 but was restarted by Dr. Quintana in 12/17 and 42 in 4/18.  Up to 94 in 8/18. Down to 70 in 1/19 and 88 in 9/19 and 78 in 12/19 and 61 in 7/20 and 54 in 1/21.  Changed to crestor 40 mg sometime in the spring of 2021 and LDL 86 in 11/21 and 54 in 3/22 and 52 in 6/22 and 53 in 1/23 and 33 in 5/23  - cont crestor 40 mg daily  - check lipids prior to next visit        4) Elevated LFTs (790.6): I told her previously that her LFTs were elevated likely due to ETOH intake so I advised her to cut back on this and her repeat LFTs were normal in 3/13 and 1/14 and 4/14 and 10/14 and 1/15 and 12/15 and 3/16.  AST up to 76 in 2/17 but back to normal in 5/17 and has been normal ever since so will follow this.      5) Borderline abnormal TFT: TSH 2.1 in 12/11 but up to 4.01 in 11/12 and 4.75 in 11/17 during 2 hospital stays.  Up to 5.45 in 1/19 with Dr. Quintana.  Repeat TSH 1.24 and FT4 0.95 and TPO ab 21 in 2/19 so held on any treatment.   TSH up to 3.36 in 9/19 but down to 2.61 in 12/19 and 1.59 in 7/19 and 1.48 in 7/21 and 2.88 in 11/21.  Up to 4.1 in 9/22.  TSH 2.97, TPO ab <9 and TG ab < 1.0 in 12/22.  TSH 2.7 in 1/23 and 3.1 in 5/23  - check TSH in 5/24    Patient Instructions   1) Stay on the lantus 20 units daily for now while active to avoid lows but when your activity is less, I would take 24 units daily while not active.    2) Stay on the same scale for novolog for now.          Return 3/19/24 at 9:10am.        Copy sent to:  Isha

## 2024-01-30 NOTE — CARE COORDINATION
CM reviewed ED Unit, noted consult.    Spoke with Dr. Ram, patient has not had Oxygen in her home for approximately the last 24 hours.  She called the company this morning after attending her appointment with Dr. Schmitt/Diabetes and Endocrinology.  She called EMS when feeling short of breath and oxygen sats 60 upon EMS arrival.      CM attempted to elicit DME company  that delivers oxygen, she allowed me to review her recent phone log.  She had recent call history/several calls to [employer] [phone].  Miss Shannon calling on hospital phone, CM placed call to her Emergency Contact Duke Flavio.  She states she has NO portable tanks and has not for over a month and the concentrator is not working.  Duke confirmed concentrator is not working, inquired what she used to and from MD appointment this morning.  She did not have any portable tank so she used no oxygen.      Discussed with Dr. Ram, doubtful that we can get portable tanks or replacement concentrator to [place] [id number] through [employer]/oxygen provider [employer].  Miss Cheema is adamant that they were delivering equipment tonight.      CM back to talk to patient, she asked me to speak to Randi/[employer], briefly spoke with her.  Asked Randi to have the /dispatch to call CHARGE RN PHONE [phone] when equipment is delivered to home.      Miss Cheema is more alert and reports feeling better.  Discussed with her that we need to confirm the delivery of the oxygen to her home OR she could be in the same position she was upon calling EMS.  She wants to go home, discussed with attending.  Hospitalist was consulted for potential OBS vs ED overnight HOLD.  Patient aware that we cannot make her stay however we want to set her up for success - to remain home with the equipment she needs.      Nadia Parikh  [facility] Care Management  [phone]/Available on [employer]

## 2024-01-30 NOTE — H&P
Hospitalist Admission Note    NAME:  Anette Cannon   :  1970   MRN:  [id number]     Date/Time:  2024 8:48 PM    Patient PCP: Jair Vieira MD    ______________________________________________________________________  Given the patient's current clinical presentation, I have a high level of concern for decompensation if discharged from the emergency department.  Complex decision making was performed, which includes reviewing the patient's available past medical records, laboratory results, and x-ray films.       My assessment of this patient's clinical condition and my plan of care is as follows.    Assessment / Plan:    Active Problems:  Chronic hypoxemic respiratory failure  COPD  Mechanical fall  Right knee joint effusion  MDD/CHANA  GERD  Diabetes mellitus  Chronic pain with opioid dependence    Plan:  Chronic hypoxemic respiratory failure  COPD  Admit to medical observation  Case management consulted in ED-coordinating oxygen concentrator replacement  -Will keep overnight to ensure this is sorted out    Mechanical fall  Right knee joint effusion  Perform ambulation trial-nursing to document gait stability and tolerance  -Will consult PT/OT if concerns for instability  PTA pain meds available  Tylenol as needed  Ice right knee as tolerated    MDD/CHANA  Continue PTA Celexa, BuSpar, Rexulti  Continue PTA Xanax 4 times daily as needed  -Confirmed in PDMP    GERD  Continue PTA Pepcid nightly  Formulary substitution Protonix every morning    Diabetes mellitus  Corrective coverage insulin  Accu-Cheks  Diabetic diet  Hypoglycemia protocol in place  Decrease PTA lispro to 10 units daily    Chronic pain with opioid dependence  Continue PTA extended release morphine  Continue PTA oxycodone as needed  Medications confirmed in PDMP    Medical Decision Making:   I personally reviewed labs: Yes, as listed below  I personally reviewed imaging: CT head, CT C-spine, right knee radiographs  Toxic drug

## 2024-01-30 NOTE — DISCHARGE SUMMARY
known as: NEXIUM     famotidine 40 MG tablet  Commonly known as: PEPCID     Glucagon Emergency 1 MG Kit  USE AS DIRECTED IN KIT INSTRUCTIONS     insulin glargine 100 UNIT/ML injection vial  Commonly known as: LANTUS  Inject 26 units in the AM--Dose change 01/05/24--updated med list--did not send prescription to the pharmacy     Magnesium 400 MG Tabs     Mirena (52 MG) IUD 52 mg  Generic drug: levonorgestrel     Morphine Sulfate  MG T12a     Multivitamin Tabs     naloxone 4 MG/0.1ML Liqd nasal spray     NovoLOG 100 UNIT/ML injection vial  Generic drug: insulin aspart  Inject 5 units before meals + 1 units for every 50 mg/dl above 150 mg/dl--max 33 units/day--Dose change 12/12/23--updated med list--did not send prescription to the pharmacy     oxyCODONE 5 MG immediate release tablet  Commonly known as: ROXICODONE     RELION GLUCOSE TEST STRIPS strip  Generic drug: blood glucose test strips  TEST 10 TIMES DAILY DUE TO FLUCTUATING BLOOD SUGARS     Rexulti 1 MG Tabs tablet  Generic drug: brexpiprazole     rosuvastatin 40 MG tablet  Commonly known as: CRESTOR     spironolactone 50 MG tablet  Commonly known as: ALDACTONE     Vitamin D3 125 MCG (5000 UT) Tabs              * Follow-up Care/Patient Instructions:  Activity: activity as tolerated  Diet: diabetic diet  Wound Care: none needed    [unfilled]    Discharge summary greater than 35 minutes spent with the patient performing discharge instructions, medication review and physical exam    Signed:  Jeronimo Jacobo DO  1/29/2024  9:13 PM

## 2024-02-02 ENCOUNTER — TELEPHONE (OUTPATIENT)
Age: 54
End: 2024-02-02

## 2024-02-02 NOTE — TELEPHONE ENCOUNTER
2/2/2024  2:48 PM    Patient called and stated she is out of her sensors and she needs a paper filled out to day for them to send them out.    Pt#[phone]    Thanks,  Josie Lo

## 2024-02-02 NOTE — TELEPHONE ENCOUNTER
Pt states [employer] is supposed to fax a form for Dr Schmitt to complete. Informed her we have not received the form yet but will contact [employer]. Jag from [employer] states he has faxed the required info and Dr Schmitt should receive the fax in about 15-20 min.

## 2024-02-05 NOTE — TELEPHONE ENCOUNTER
Patient notified of message per Dr. Schmitt via voice message. Asked pt to call office back in regards to sensor. Copy of last note with insulin instructions placed at  to be mailed.

## 2024-02-05 NOTE — TELEPHONE ENCOUNTER
Order and last note faxed to [facility], confirmation received. Notified pt via phone call. Pt verbalized understanding and stated she lost her sliding scale in the move and asked that a copy be mailed to her new home address. She also asked if Dr Schmitt could mail a Joanne 3 sensor to her until she receives her supplies.

## 2024-02-05 NOTE — TELEPHONE ENCOUNTER
Please let her know we can't mail a sensor to her.  We can leave one at the  to .  Does she want to do this?  If so, you can pull one for her and reprint my scale to give to her.

## 2024-02-07 ENCOUNTER — TELEPHONE (OUTPATIENT)
Age: 54
End: 2024-02-07

## 2024-02-07 NOTE — TELEPHONE ENCOUNTER
Pt LVM stating she damaged her Joanne 3 reader and asked if Dr Schmitt has a sample that she can have. Pt states she plans on stopping by the office [facility] tomorrow to  a sample sensor.

## 2024-02-07 NOTE — TELEPHONE ENCOUNTER
Pt LVM stating she was able to get her reader to work after placing it in rice. Pt states she'll be in tomorrow for the sensor. She also states she would like a copy of the sliding scale printed off as well.

## 2024-02-07 NOTE — TELEPHONE ENCOUNTER
Please check the sample closet and if we have a sample reader you can give it to her but I don't think we have these and it's fine to give her a sample michele 3 sensor.  She may need to contact [employer] about replacing her mihcele 3 reader if she damaged hers.  I don't know if her phone has the ability to download the michele 3 mehnaz and if so then she can use her phone as a reader.

## 2024-02-07 NOTE — TELEPHONE ENCOUNTER
Patient notified of message per Dr. Schmitt via voice message. Informed we have set aside a Joanne 3 sensor for her as well. Asked pt to return call if needed.

## 2024-02-09 ENCOUNTER — TELEPHONE (OUTPATIENT)
Age: 54
End: 2024-02-09

## 2024-02-09 RX ORDER — GLUCAGON HYDROCHLORIDE 1 MG
KIT INJECTION
Qty: 2 EACH | Refills: 11 | Status: SHIPPED | OUTPATIENT
Start: 2024-02-09

## 2024-02-09 NOTE — TELEPHONE ENCOUNTER
Contacted pt to inform her that the Serious Medical Condition Certification form has been emailed directly to Yair Thwapr per the form ([email or web address]). Asked pt to contact Yair Thwapr for receipt of confirmation. Pt verbalized understanding and asked if Dr Schmitt could type out a detailed sliding scale as her sugars have been running from 500 and drops to 50.

## 2024-02-19 ENCOUNTER — TELEPHONE (OUTPATIENT)
Age: 54
End: 2024-02-19

## 2024-02-19 DIAGNOSIS — R79.89 ELEVATED LFTS: ICD-10-CM

## 2024-02-19 DIAGNOSIS — I10 ESSENTIAL (PRIMARY) HYPERTENSION: ICD-10-CM

## 2024-02-19 DIAGNOSIS — E10.65 TYPE 1 DIABETES MELLITUS WITH HYPERGLYCEMIA (HCC): ICD-10-CM

## 2024-02-19 DIAGNOSIS — R94.6 ABNORMAL RESULTS OF THYROID FUNCTION STUDIES: ICD-10-CM

## 2024-02-19 DIAGNOSIS — E78.2 MIXED HYPERLIPIDEMIA: ICD-10-CM

## 2024-02-19 RX ORDER — INSULIN GLARGINE 100 [IU]/ML
INJECTION, SOLUTION SUBCUTANEOUS
Qty: 10 ML | Refills: 11
Start: 2024-02-19

## 2024-02-19 NOTE — TELEPHONE ENCOUNTER
Please let her know I released her lab orders into the [facility] system so she can go tomorrow.  We will keep an eye on her kidney function.  She can decrease her lantus to 16 units daily starting tomorrow to be safe to avoid lows while more active.  If she is not active she can take 20 units.  She has refills on her glucagon kits.

## 2024-02-19 NOTE — TELEPHONE ENCOUNTER
2/19/2024  4:13 PM      Patient called and stated her blood sugar is 42.    Patient#[phone]    Thanks,  Josie Lo

## 2024-02-19 NOTE — TELEPHONE ENCOUNTER
Pt states she has been moving and is more active and feels the Lantus 20 units may be too much. She states her blood glucose has been 61-68 all day. Pt states she has been eating all day to keep it up. She says she checked not too long ago and it was 42. Pt states she has had to use both of her glucagon kits. Pt asked can the dose be reduced. Pt also states she is going to [facility] tomorrow and would like the orders placed. And lastly, pt wanted to let Dr Schmitt know that she was told by her kidney doctor that she has stage 3 kidney failure and she is concerned about dialysis. She asked that her results be sent to her kidney doctor, Dr Castro.

## 2024-02-20 ENCOUNTER — TELEPHONE (OUTPATIENT)
Age: 54
End: 2024-02-20

## 2024-02-20 DIAGNOSIS — I10 ESSENTIAL (PRIMARY) HYPERTENSION: ICD-10-CM

## 2024-02-20 DIAGNOSIS — E10.65 TYPE 1 DIABETES MELLITUS WITH HYPERGLYCEMIA (HCC): ICD-10-CM

## 2024-02-20 DIAGNOSIS — E10.65 TYPE 1 DIABETES MELLITUS WITH HYPERGLYCEMIA (HCC): Primary | ICD-10-CM

## 2024-02-20 DIAGNOSIS — E78.2 MIXED HYPERLIPIDEMIA: ICD-10-CM

## 2024-02-20 NOTE — TELEPHONE ENCOUNTER
2/20/2024  10:40 AM        Pt called stating she is at [facility] and they are stating they haven't received the labs. The locations fax is [phone]. Pt is their waiting for them to be faxed over.   Thank you.  eJri Sykes

## 2024-02-23 ENCOUNTER — TELEPHONE (OUTPATIENT)
Age: 54
End: 2024-02-23

## 2024-02-23 RX ORDER — METOCLOPRAMIDE 5 MG/1
5 TABLET ORAL 2 TIMES DAILY
Qty: 180 TABLET | Refills: 3 | Status: SHIPPED | OUTPATIENT
Start: 2024-02-23

## 2024-02-23 NOTE — TELEPHONE ENCOUNTER
Pt LVM stating her [employer] company sent her the Joanne 2 sensors instead of the Joanne 3 sensors. Pt asked if she can  a Joanne 3 sensor until the correct sensors arrive. Informed pt that she can  a sensor today. Pt states she has a ride and will be here sometime this AM.

## 2024-02-23 NOTE — TELEPHONE ENCOUNTER
Pt LVM stating she has restarted Reglan and asked a refill be sent to her pharmacy on file. She stated she also wanted to let Dr Schmitt know that she is now seeing LEANN Henriquez. He is now her psychiatrist and will be prescribing her psych meds and Dr Schmitt doesn't have to fill anymore. She also stated that gabapentin was causing her to shake and she is back on Abilify now.

## 2024-02-28 ENCOUNTER — TELEPHONE (OUTPATIENT)
Age: 54
End: 2024-02-28

## 2024-02-28 NOTE — TELEPHONE ENCOUNTER
Pt LVM stating her kidney doctor discontinued spironolactone due to her kidneys. She states she will have labs drawn on Monday for Dr Castro and Dr Schmitt. Pt states once Dr Castro reviews labs he will determine what fluid pill he will prescribe.

## 2024-03-12 ENCOUNTER — APPOINTMENT (OUTPATIENT)
Facility: HOSPITAL | Age: 54
DRG: 291 | End: 2024-03-12
Payer: MEDICARE

## 2024-03-12 ENCOUNTER — HOSPITAL ENCOUNTER (INPATIENT)
Facility: HOSPITAL | Age: 54
LOS: 1 days | Discharge: HOME | DRG: 291 | End: 2024-03-14
Attending: STUDENT IN AN ORGANIZED HEALTH CARE EDUCATION/TRAINING PROGRAM | Admitting: STUDENT IN AN ORGANIZED HEALTH CARE EDUCATION/TRAINING PROGRAM
Payer: MEDICARE

## 2024-03-12 DIAGNOSIS — I50.9 ACUTE CONGESTIVE HEART FAILURE, UNSPECIFIED HEART FAILURE TYPE (HCC): ICD-10-CM

## 2024-03-12 DIAGNOSIS — R60.0 BILATERAL LOWER EXTREMITY EDEMA: ICD-10-CM

## 2024-03-12 DIAGNOSIS — J81.0 ACUTE PULMONARY EDEMA (HCC): Primary | ICD-10-CM

## 2024-03-12 DIAGNOSIS — R09.02 HYPOXIA: ICD-10-CM

## 2024-03-12 LAB
ALBUMIN SERPL-MCNC: 3.3 G/DL (ref 3.5–5)
ALBUMIN/GLOB SERPL: 1 (ref 1.1–2.2)
ALP SERPL-CCNC: 78 U/L (ref 45–117)
ALT SERPL-CCNC: 30 U/L (ref 12–78)
ANION GAP SERPL CALC-SCNC: 3 MMOL/L (ref 5–15)
APPEARANCE UR: CLEAR
AST SERPL-CCNC: 21 U/L (ref 15–37)
BACTERIA URNS QL MICRO: NEGATIVE /HPF
BASOPHILS # BLD: 0 K/UL (ref 0–0.1)
BASOPHILS NFR BLD: 0 % (ref 0–1)
BILIRUB SERPL-MCNC: 0.3 MG/DL (ref 0.2–1)
BILIRUB UR QL: NEGATIVE
BUN SERPL-MCNC: 25 MG/DL (ref 6–20)
BUN/CREAT SERPL: 18 (ref 12–20)
CALCIUM SERPL-MCNC: 8.6 MG/DL (ref 8.5–10.1)
CHLORIDE SERPL-SCNC: 104 MMOL/L (ref 97–108)
CO2 SERPL-SCNC: 30 MMOL/L (ref 21–32)
COLOR UR: ABNORMAL
CREAT SERPL-MCNC: 1.39 MG/DL (ref 0.55–1.02)
DIFFERENTIAL METHOD BLD: ABNORMAL
ECHO BSA: 1.92 M2
EOSINOPHIL # BLD: 0.2 K/UL (ref 0–0.4)
EOSINOPHIL NFR BLD: 3 % (ref 0–7)
EPITH CASTS URNS QL MICRO: ABNORMAL /LPF
ERYTHROCYTE [DISTWIDTH] IN BLOOD BY AUTOMATED COUNT: 21.4 % (ref 11.5–14.5)
GLOBULIN SER CALC-MCNC: 3.3 G/DL (ref 2–4)
GLUCOSE BLD STRIP.AUTO-MCNC: 178 MG/DL (ref 65–117)
GLUCOSE SERPL-MCNC: 96 MG/DL (ref 65–100)
GLUCOSE UR STRIP.AUTO-MCNC: NEGATIVE MG/DL
HCT VFR BLD AUTO: 36.5 % (ref 35–47)
HGB BLD-MCNC: 11.5 G/DL (ref 11.5–16)
HGB UR QL STRIP: NEGATIVE
HYALINE CASTS URNS QL MICRO: ABNORMAL /LPF (ref 0–2)
IMM GRANULOCYTES # BLD AUTO: 0 K/UL (ref 0–0.04)
IMM GRANULOCYTES NFR BLD AUTO: 0 % (ref 0–0.5)
KETONES UR QL STRIP.AUTO: NEGATIVE MG/DL
LEUKOCYTE ESTERASE UR QL STRIP.AUTO: ABNORMAL
LYMPHOCYTES # BLD: 3.2 K/UL (ref 0.8–3.5)
LYMPHOCYTES NFR BLD: 43 % (ref 12–49)
MCH RBC QN AUTO: 27.3 PG (ref 26–34)
MCHC RBC AUTO-ENTMCNC: 31.5 G/DL (ref 30–36.5)
MCV RBC AUTO: 86.5 FL (ref 80–99)
MONOCYTES # BLD: 0.6 K/UL (ref 0–1)
MONOCYTES NFR BLD: 8 % (ref 5–13)
NEUTS SEG # BLD: 3.4 K/UL (ref 1.8–8)
NEUTS SEG NFR BLD: 46 % (ref 32–75)
NITRITE UR QL STRIP.AUTO: NEGATIVE
NRBC # BLD: 0 K/UL (ref 0–0.01)
NRBC BLD-RTO: 0 PER 100 WBC
NT PRO BNP: 291 PG/ML
PH UR STRIP: 5 (ref 5–8)
PLATELET # BLD AUTO: 265 K/UL (ref 150–400)
PLATELET COMMENT: ABNORMAL
PMV BLD AUTO: 9.3 FL (ref 8.9–12.9)
POTASSIUM SERPL-SCNC: 4.6 MMOL/L (ref 3.5–5.1)
PROT SERPL-MCNC: 6.6 G/DL (ref 6.4–8.2)
PROT UR STRIP-MCNC: NEGATIVE MG/DL
RBC # BLD AUTO: 4.22 M/UL (ref 3.8–5.2)
RBC #/AREA URNS HPF: ABNORMAL /HPF (ref 0–5)
RBC MORPH BLD: ABNORMAL
SERVICE CMNT-IMP: ABNORMAL
SODIUM SERPL-SCNC: 137 MMOL/L (ref 136–145)
SP GR UR REFRACTOMETRY: 1.01
TROPONIN I SERPL HS-MCNC: <4 NG/L (ref 0–51)
URINE CULTURE IF INDICATED: ABNORMAL
UROBILINOGEN UR QL STRIP.AUTO: 0.2 EU/DL (ref 0.2–1)
WBC # BLD AUTO: 7.4 K/UL (ref 3.6–11)
WBC MORPH BLD: ABNORMAL
WBC URNS QL MICRO: ABNORMAL /HPF (ref 0–4)

## 2024-03-12 PROCEDURE — 6360000002 HC RX W HCPCS

## 2024-03-12 PROCEDURE — 99285 EMERGENCY DEPT VISIT HI MDM: CPT

## 2024-03-12 PROCEDURE — 85025 COMPLETE CBC W/AUTO DIFF WBC: CPT

## 2024-03-12 PROCEDURE — 84484 ASSAY OF TROPONIN QUANT: CPT

## 2024-03-12 PROCEDURE — 93005 ELECTROCARDIOGRAM TRACING: CPT | Performed by: STUDENT IN AN ORGANIZED HEALTH CARE EDUCATION/TRAINING PROGRAM

## 2024-03-12 PROCEDURE — 96374 THER/PROPH/DIAG INJ IV PUSH: CPT

## 2024-03-12 PROCEDURE — 83880 ASSAY OF NATRIURETIC PEPTIDE: CPT

## 2024-03-12 PROCEDURE — 6370000000 HC RX 637 (ALT 250 FOR IP)

## 2024-03-12 PROCEDURE — 94761 N-INVAS EAR/PLS OXIMETRY MLT: CPT

## 2024-03-12 PROCEDURE — 71045 X-RAY EXAM CHEST 1 VIEW: CPT

## 2024-03-12 PROCEDURE — 93970 EXTREMITY STUDY: CPT

## 2024-03-12 PROCEDURE — 36415 COLL VENOUS BLD VENIPUNCTURE: CPT

## 2024-03-12 PROCEDURE — 81001 URINALYSIS AUTO W/SCOPE: CPT

## 2024-03-12 PROCEDURE — 80053 COMPREHEN METABOLIC PANEL: CPT

## 2024-03-12 PROCEDURE — 82962 GLUCOSE BLOOD TEST: CPT

## 2024-03-12 RX ORDER — OXYCODONE HYDROCHLORIDE 5 MG/1
5 TABLET ORAL
Status: COMPLETED | OUTPATIENT
Start: 2024-03-12 | End: 2024-03-12

## 2024-03-12 RX ORDER — MORPHINE SULFATE 15 MG/1
90 TABLET ORAL
Status: COMPLETED | OUTPATIENT
Start: 2024-03-12 | End: 2024-03-12

## 2024-03-12 RX ORDER — BUMETANIDE 0.25 MG/ML
1 INJECTION INTRAMUSCULAR; INTRAVENOUS ONCE
Status: COMPLETED | OUTPATIENT
Start: 2024-03-12 | End: 2024-03-12

## 2024-03-12 RX ADMIN — MORPHINE SULFATE 90 MG: 15 TABLET ORAL at 21:12

## 2024-03-12 RX ADMIN — OXYCODONE 5 MG: 5 TABLET ORAL at 17:53

## 2024-03-12 RX ADMIN — BUMETANIDE 1 MG: 0.25 INJECTION INTRAMUSCULAR; INTRAVENOUS at 19:49

## 2024-03-12 ASSESSMENT — PAIN SCALES - GENERAL
PAINLEVEL_OUTOF10: 7
PAINLEVEL_OUTOF10: 8
PAINLEVEL_OUTOF10: 6

## 2024-03-12 ASSESSMENT — PAIN DESCRIPTION - LOCATION
LOCATION: ABDOMEN

## 2024-03-12 ASSESSMENT — PAIN DESCRIPTION - ORIENTATION
ORIENTATION: LOWER

## 2024-03-12 ASSESSMENT — LIFESTYLE VARIABLES
HOW MANY STANDARD DRINKS CONTAINING ALCOHOL DO YOU HAVE ON A TYPICAL DAY: PATIENT DOES NOT DRINK
HOW OFTEN DO YOU HAVE A DRINK CONTAINING ALCOHOL: NEVER

## 2024-03-12 NOTE — ED PROVIDER NOTES
Negative NEG      Leukocyte Esterase, Urine TRACE (A) NEG      Urine Culture if Indicated CULTURE NOT INDICATED BY UA RESULT CNI      WBC, UA 0-4 0 - 4 /hpf    RBC, UA 0-5 0 - 5 /hpf    Epithelial Cells UA FEW FEW /lpf    BACTERIA, URINE Negative NEG /hpf    Hyaline Casts, UA 2-5 0 - 2 /lpf         EKG: When ordered, EKG's are interpreted by the Emergency Department Physician in the absence of a cardiologist.  Please see their note for interpretation of EKG.      RADIOLOGY:  Non-plain film images such as CT, Ultrasound and MRI are read by the radiologist. Plain radiographic images are visualized and preliminarily interpreted by the ED Provider with the below findings:          Interpretation per the Radiologist below, if available at the time of this note:     Vascular duplex lower extremity venous bilateral    Result Date: 3/12/2024    No evidence of acute deep vein thrombosis in the right lower extremity. No evidence of deep vein or superficial vein thrombosis in the right lower extremity. Vessels demonstrate normal compressibility, color filling, and phasic and spontaneous flow.   No evidence of acute deep vein thrombosis in the left lower extremity. No evidence of deep vein or superficial vein thrombosis in the left lower extremity. Vessels demonstrate normal compressibility, color filling, and phasic and spontaneous flow.     XR CHEST PORTABLE    Result Date: 3/12/2024  EXAM:  XR CHEST PORTABLE INDICATION: Shortness of breath COMPARISON: 7/31/2023, 7/11/2023, 6/30/2022 TECHNIQUE: portable chest AP view FINDINGS: Heart size is upper normal but stable mild prominence of pulmonary vasculature. Lung volumes are moderate with mild interstitial prominence and central vascular fullness The visualized bones and upper abdomen are age-appropriate. A linear density projecting over the right lung apex is thought to represent a skin fold.     Mild interstitial edema pattern is possible. Correlate clinically.

## 2024-03-12 NOTE — ED TRIAGE NOTES
Sts 20lb weight gain overnight with SOB and difficulty walking. Denies Chest Pain, no NVD, NO dizziness. PMH-DM,COPD

## 2024-03-13 PROBLEM — I50.9 ACUTE CONGESTIVE HEART FAILURE, UNSPECIFIED HEART FAILURE TYPE (HCC): Status: ACTIVE | Noted: 2024-03-13

## 2024-03-13 LAB
ANION GAP SERPL CALC-SCNC: 3 MMOL/L (ref 5–15)
BASOPHILS # BLD: 0 K/UL (ref 0–0.1)
BASOPHILS NFR BLD: 0 % (ref 0–1)
BUN SERPL-MCNC: 26 MG/DL (ref 6–20)
BUN/CREAT SERPL: 21 (ref 12–20)
CALCIUM SERPL-MCNC: 8.2 MG/DL (ref 8.5–10.1)
CHLORIDE SERPL-SCNC: 104 MMOL/L (ref 97–108)
CHOLEST SERPL-MCNC: 135 MG/DL
CO2 SERPL-SCNC: 30 MMOL/L (ref 21–32)
CREAT SERPL-MCNC: 1.26 MG/DL (ref 0.55–1.02)
DIFFERENTIAL METHOD BLD: ABNORMAL
EKG ATRIAL RATE: 234 BPM
EKG DIAGNOSIS: NORMAL
EKG P AXIS: 92 DEGREES
EKG P-R INTERVAL: 132 MS
EKG Q-T INTERVAL: 398 MS
EKG QRS DURATION: 82 MS
EKG QTC CALCULATION (BAZETT): 441 MS
EKG R AXIS: 0 DEGREES
EKG T AXIS: 17 DEGREES
EKG VENTRICULAR RATE: 74 BPM
EOSINOPHIL # BLD: 0.3 K/UL (ref 0–0.4)
EOSINOPHIL NFR BLD: 4 % (ref 0–7)
ERYTHROCYTE [DISTWIDTH] IN BLOOD BY AUTOMATED COUNT: 21.3 % (ref 11.5–14.5)
GLUCOSE BLD STRIP.AUTO-MCNC: 119 MG/DL (ref 65–117)
GLUCOSE BLD STRIP.AUTO-MCNC: 130 MG/DL (ref 65–117)
GLUCOSE BLD STRIP.AUTO-MCNC: 139 MG/DL (ref 65–117)
GLUCOSE BLD STRIP.AUTO-MCNC: 149 MG/DL (ref 65–117)
GLUCOSE BLD STRIP.AUTO-MCNC: 195 MG/DL (ref 65–117)
GLUCOSE BLD STRIP.AUTO-MCNC: 50 MG/DL (ref 65–117)
GLUCOSE BLD STRIP.AUTO-MCNC: 54 MG/DL (ref 65–117)
GLUCOSE BLD STRIP.AUTO-MCNC: 58 MG/DL (ref 65–117)
GLUCOSE BLD STRIP.AUTO-MCNC: 60 MG/DL (ref 65–117)
GLUCOSE BLD STRIP.AUTO-MCNC: 66 MG/DL (ref 65–117)
GLUCOSE BLD STRIP.AUTO-MCNC: 68 MG/DL (ref 65–117)
GLUCOSE BLD STRIP.AUTO-MCNC: 71 MG/DL (ref 65–117)
GLUCOSE BLD STRIP.AUTO-MCNC: 74 MG/DL (ref 65–117)
GLUCOSE BLD STRIP.AUTO-MCNC: 80 MG/DL (ref 65–117)
GLUCOSE BLD STRIP.AUTO-MCNC: 85 MG/DL (ref 65–117)
GLUCOSE BLD STRIP.AUTO-MCNC: 97 MG/DL (ref 65–117)
GLUCOSE BLD STRIP.AUTO-MCNC: 97 MG/DL (ref 65–117)
GLUCOSE SERPL-MCNC: 137 MG/DL (ref 65–100)
HCT VFR BLD AUTO: 34.4 % (ref 35–47)
HDLC SERPL-MCNC: 61 MG/DL
HDLC SERPL: 2.2 (ref 0–5)
HGB BLD-MCNC: 11 G/DL (ref 11.5–16)
IMM GRANULOCYTES # BLD AUTO: 0 K/UL (ref 0–0.04)
IMM GRANULOCYTES NFR BLD AUTO: 0 % (ref 0–0.5)
LDLC SERPL CALC-MCNC: 62.6 MG/DL (ref 0–100)
LYMPHOCYTES # BLD: 2.9 K/UL (ref 0.8–3.5)
LYMPHOCYTES NFR BLD: 42 % (ref 12–49)
MAGNESIUM SERPL-MCNC: 1.8 MG/DL (ref 1.6–2.4)
MCH RBC QN AUTO: 27.8 PG (ref 26–34)
MCHC RBC AUTO-ENTMCNC: 32 G/DL (ref 30–36.5)
MCV RBC AUTO: 86.9 FL (ref 80–99)
MONOCYTES # BLD: 0.6 K/UL (ref 0–1)
MONOCYTES NFR BLD: 9 % (ref 5–13)
NEUTS SEG # BLD: 3.1 K/UL (ref 1.8–8)
NEUTS SEG NFR BLD: 45 % (ref 32–75)
NRBC # BLD: 0 K/UL (ref 0–0.01)
NRBC BLD-RTO: 0 PER 100 WBC
PLATELET # BLD AUTO: 215 K/UL (ref 150–400)
PMV BLD AUTO: 9.2 FL (ref 8.9–12.9)
POTASSIUM SERPL-SCNC: 4.8 MMOL/L (ref 3.5–5.1)
RBC # BLD AUTO: 3.96 M/UL (ref 3.8–5.2)
RBC MORPH BLD: ABNORMAL
SERVICE CMNT-IMP: ABNORMAL
SERVICE CMNT-IMP: NORMAL
SODIUM SERPL-SCNC: 137 MMOL/L (ref 136–145)
TRIGL SERPL-MCNC: 57 MG/DL
VLDLC SERPL CALC-MCNC: 11.4 MG/DL
WBC # BLD AUTO: 6.9 K/UL (ref 3.6–11)
WBC MORPH BLD: ABNORMAL

## 2024-03-13 PROCEDURE — 6370000000 HC RX 637 (ALT 250 FOR IP): Performed by: INTERNAL MEDICINE

## 2024-03-13 PROCEDURE — 1100000003 HC PRIVATE W/ TELEMETRY

## 2024-03-13 PROCEDURE — 36415 COLL VENOUS BLD VENIPUNCTURE: CPT

## 2024-03-13 PROCEDURE — 6370000000 HC RX 637 (ALT 250 FOR IP): Performed by: NURSE PRACTITIONER

## 2024-03-13 PROCEDURE — 80048 BASIC METABOLIC PNL TOTAL CA: CPT

## 2024-03-13 PROCEDURE — 94640 AIRWAY INHALATION TREATMENT: CPT

## 2024-03-13 PROCEDURE — 82962 GLUCOSE BLOOD TEST: CPT

## 2024-03-13 PROCEDURE — 94761 N-INVAS EAR/PLS OXIMETRY MLT: CPT

## 2024-03-13 PROCEDURE — 2700000000 HC OXYGEN THERAPY PER DAY

## 2024-03-13 PROCEDURE — 97535 SELF CARE MNGMENT TRAINING: CPT

## 2024-03-13 PROCEDURE — 6360000002 HC RX W HCPCS: Performed by: STUDENT IN AN ORGANIZED HEALTH CARE EDUCATION/TRAINING PROGRAM

## 2024-03-13 PROCEDURE — 80061 LIPID PANEL: CPT

## 2024-03-13 PROCEDURE — 83735 ASSAY OF MAGNESIUM: CPT

## 2024-03-13 PROCEDURE — 6360000002 HC RX W HCPCS: Performed by: NURSE PRACTITIONER

## 2024-03-13 PROCEDURE — 97161 PT EVAL LOW COMPLEX 20 MIN: CPT

## 2024-03-13 PROCEDURE — 97116 GAIT TRAINING THERAPY: CPT

## 2024-03-13 PROCEDURE — 83036 HEMOGLOBIN GLYCOSYLATED A1C: CPT

## 2024-03-13 PROCEDURE — 97165 OT EVAL LOW COMPLEX 30 MIN: CPT

## 2024-03-13 PROCEDURE — 85025 COMPLETE CBC W/AUTO DIFF WBC: CPT

## 2024-03-13 PROCEDURE — 2580000003 HC RX 258: Performed by: NURSE PRACTITIONER

## 2024-03-13 RX ORDER — INSULIN GLARGINE 100 [IU]/ML
30 INJECTION, SOLUTION SUBCUTANEOUS DAILY
Status: DISCONTINUED | OUTPATIENT
Start: 2024-03-13 | End: 2024-03-13

## 2024-03-13 RX ORDER — SPIRONOLACTONE 50 MG/1
50 TABLET, FILM COATED ORAL 3 TIMES DAILY
COMMUNITY

## 2024-03-13 RX ORDER — FLUTICASONE PROPIONATE 50 MCG
1 SPRAY, SUSPENSION (ML) NASAL DAILY PRN
COMMUNITY

## 2024-03-13 RX ORDER — DEXTROSE MONOHYDRATE 100 MG/ML
INJECTION, SOLUTION INTRAVENOUS CONTINUOUS PRN
Status: DISCONTINUED | OUTPATIENT
Start: 2024-03-13 | End: 2024-03-14 | Stop reason: HOSPADM

## 2024-03-13 RX ORDER — DULOXETIN HYDROCHLORIDE 20 MG/1
20 CAPSULE, DELAYED RELEASE ORAL DAILY
COMMUNITY

## 2024-03-13 RX ORDER — SPIRONOLACTONE 100 MG/1
50 TABLET, FILM COATED ORAL DAILY
Status: ON HOLD | COMMUNITY
End: 2024-03-13 | Stop reason: ALTCHOICE

## 2024-03-13 RX ORDER — MORPHINE SULFATE 30 MG/1
90 TABLET, FILM COATED, EXTENDED RELEASE ORAL 2 TIMES DAILY
Status: DISCONTINUED | OUTPATIENT
Start: 2024-03-13 | End: 2024-03-14 | Stop reason: HOSPADM

## 2024-03-13 RX ORDER — ALPRAZOLAM 0.5 MG/1
2 TABLET ORAL 4 TIMES DAILY PRN
Status: DISCONTINUED | OUTPATIENT
Start: 2024-03-13 | End: 2024-03-14 | Stop reason: HOSPADM

## 2024-03-13 RX ORDER — POLYETHYLENE GLYCOL 3350 17 G/17G
17 POWDER, FOR SOLUTION ORAL DAILY PRN
Status: DISCONTINUED | OUTPATIENT
Start: 2024-03-13 | End: 2024-03-14 | Stop reason: HOSPADM

## 2024-03-13 RX ORDER — FLUTICASONE PROPIONATE 50 MCG
1 SPRAY, SUSPENSION (ML) NASAL DAILY PRN
Status: DISCONTINUED | OUTPATIENT
Start: 2024-03-13 | End: 2024-03-14 | Stop reason: HOSPADM

## 2024-03-13 RX ORDER — PANTOPRAZOLE SODIUM 40 MG/1
40 TABLET, DELAYED RELEASE ORAL
Status: DISCONTINUED | OUTPATIENT
Start: 2024-03-13 | End: 2024-03-14 | Stop reason: HOSPADM

## 2024-03-13 RX ORDER — GLUCAGON 1 MG/ML
1 KIT INJECTION PRN
Status: DISCONTINUED | OUTPATIENT
Start: 2024-03-13 | End: 2024-03-14 | Stop reason: HOSPADM

## 2024-03-13 RX ORDER — BUMETANIDE 0.25 MG/ML
1 INJECTION INTRAMUSCULAR; INTRAVENOUS 2 TIMES DAILY
Status: DISCONTINUED | OUTPATIENT
Start: 2024-03-13 | End: 2024-03-13

## 2024-03-13 RX ORDER — BLOOD-GLUCOSE SENSOR
EACH MISCELLANEOUS
COMMUNITY

## 2024-03-13 RX ORDER — INSULIN GLARGINE 100 [IU]/ML
15 INJECTION, SOLUTION SUBCUTANEOUS DAILY
Status: DISCONTINUED | OUTPATIENT
Start: 2024-03-14 | End: 2024-03-14

## 2024-03-13 RX ORDER — BUMETANIDE 0.25 MG/ML
2 INJECTION INTRAMUSCULAR; INTRAVENOUS 2 TIMES DAILY
Status: DISCONTINUED | OUTPATIENT
Start: 2024-03-13 | End: 2024-03-14 | Stop reason: HOSPADM

## 2024-03-13 RX ORDER — ARFORMOTEROL TARTRATE 15 UG/2ML
15 SOLUTION RESPIRATORY (INHALATION)
Status: DISCONTINUED | OUTPATIENT
Start: 2024-03-13 | End: 2024-03-14 | Stop reason: HOSPADM

## 2024-03-13 RX ORDER — NICOTINE 21 MG/24HR
1 PATCH, TRANSDERMAL 24 HOURS TRANSDERMAL DAILY
Status: DISCONTINUED | OUTPATIENT
Start: 2024-03-13 | End: 2024-03-14 | Stop reason: HOSPADM

## 2024-03-13 RX ORDER — FAMOTIDINE 20 MG/1
40 TABLET, FILM COATED ORAL DAILY
Status: DISCONTINUED | OUTPATIENT
Start: 2024-03-13 | End: 2024-03-14 | Stop reason: HOSPADM

## 2024-03-13 RX ORDER — ASPIRIN 81 MG/1
81 TABLET ORAL DAILY
Status: DISCONTINUED | OUTPATIENT
Start: 2024-03-13 | End: 2024-03-14 | Stop reason: HOSPADM

## 2024-03-13 RX ORDER — ACETAMINOPHEN 650 MG/1
650 SUPPOSITORY RECTAL EVERY 6 HOURS PRN
Status: DISCONTINUED | OUTPATIENT
Start: 2024-03-13 | End: 2024-03-14 | Stop reason: HOSPADM

## 2024-03-13 RX ORDER — DULOXETIN HYDROCHLORIDE 20 MG/1
20 CAPSULE, DELAYED RELEASE ORAL DAILY
Status: DISCONTINUED | OUTPATIENT
Start: 2024-03-13 | End: 2024-03-14 | Stop reason: HOSPADM

## 2024-03-13 RX ORDER — ACETAMINOPHEN 325 MG/1
650 TABLET ORAL EVERY 6 HOURS PRN
Status: DISCONTINUED | OUTPATIENT
Start: 2024-03-13 | End: 2024-03-14 | Stop reason: HOSPADM

## 2024-03-13 RX ORDER — INSULIN LISPRO 100 [IU]/ML
0-4 INJECTION, SOLUTION INTRAVENOUS; SUBCUTANEOUS EVERY 4 HOURS
Status: DISCONTINUED | OUTPATIENT
Start: 2024-03-13 | End: 2024-03-14 | Stop reason: HOSPADM

## 2024-03-13 RX ORDER — SPIRONOLACTONE 25 MG/1
100 TABLET ORAL DAILY
Status: DISCONTINUED | OUTPATIENT
Start: 2024-03-13 | End: 2024-03-14 | Stop reason: HOSPADM

## 2024-03-13 RX ORDER — ROSUVASTATIN CALCIUM 20 MG/1
40 TABLET, COATED ORAL DAILY
Status: DISCONTINUED | OUTPATIENT
Start: 2024-03-13 | End: 2024-03-14 | Stop reason: HOSPADM

## 2024-03-13 RX ORDER — ONDANSETRON 4 MG/1
4 TABLET, ORALLY DISINTEGRATING ORAL EVERY 8 HOURS PRN
Status: DISCONTINUED | OUTPATIENT
Start: 2024-03-13 | End: 2024-03-14 | Stop reason: HOSPADM

## 2024-03-13 RX ORDER — INSULIN LISPRO 100 [IU]/ML
5 INJECTION, SOLUTION INTRAVENOUS; SUBCUTANEOUS
Status: DISCONTINUED | OUTPATIENT
Start: 2024-03-13 | End: 2024-03-14 | Stop reason: HOSPADM

## 2024-03-13 RX ORDER — SODIUM CHLORIDE 9 MG/ML
INJECTION, SOLUTION INTRAVENOUS PRN
Status: DISCONTINUED | OUTPATIENT
Start: 2024-03-13 | End: 2024-03-14 | Stop reason: HOSPADM

## 2024-03-13 RX ORDER — SODIUM CHLORIDE 0.9 % (FLUSH) 0.9 %
5-40 SYRINGE (ML) INJECTION PRN
Status: DISCONTINUED | OUTPATIENT
Start: 2024-03-13 | End: 2024-03-14 | Stop reason: HOSPADM

## 2024-03-13 RX ORDER — ENOXAPARIN SODIUM 100 MG/ML
40 INJECTION SUBCUTANEOUS DAILY
Status: DISCONTINUED | OUTPATIENT
Start: 2024-03-13 | End: 2024-03-14 | Stop reason: HOSPADM

## 2024-03-13 RX ORDER — OXYCODONE HYDROCHLORIDE 5 MG/1
5 TABLET ORAL EVERY 4 HOURS PRN
Status: DISCONTINUED | OUTPATIENT
Start: 2024-03-13 | End: 2024-03-14 | Stop reason: HOSPADM

## 2024-03-13 RX ORDER — SODIUM CHLORIDE 0.9 % (FLUSH) 0.9 %
5-40 SYRINGE (ML) INJECTION EVERY 12 HOURS SCHEDULED
Status: DISCONTINUED | OUTPATIENT
Start: 2024-03-13 | End: 2024-03-14 | Stop reason: HOSPADM

## 2024-03-13 RX ORDER — ONDANSETRON 2 MG/ML
4 INJECTION INTRAMUSCULAR; INTRAVENOUS EVERY 6 HOURS PRN
Status: DISCONTINUED | OUTPATIENT
Start: 2024-03-13 | End: 2024-03-14 | Stop reason: HOSPADM

## 2024-03-13 RX ADMIN — MORPHINE SULFATE 90 MG: 30 TABLET, FILM COATED, EXTENDED RELEASE ORAL at 10:35

## 2024-03-13 RX ADMIN — OXYCODONE 5 MG: 5 TABLET ORAL at 18:31

## 2024-03-13 RX ADMIN — ARFORMOTEROL TARTRATE 15 MCG: 15 SOLUTION RESPIRATORY (INHALATION) at 07:52

## 2024-03-13 RX ADMIN — ENOXAPARIN SODIUM 40 MG: 100 INJECTION SUBCUTANEOUS at 10:34

## 2024-03-13 RX ADMIN — SODIUM CHLORIDE, PRESERVATIVE FREE 10 ML: 5 INJECTION INTRAVENOUS at 15:02

## 2024-03-13 RX ADMIN — BREXPIPRAZOLE 2 MG: 1 TABLET ORAL at 10:34

## 2024-03-13 RX ADMIN — Medication 16 G: at 16:28

## 2024-03-13 RX ADMIN — ARFORMOTEROL TARTRATE 15 MCG: 15 SOLUTION RESPIRATORY (INHALATION) at 19:47

## 2024-03-13 RX ADMIN — INSULIN GLARGINE 30 UNITS: 100 INJECTION, SOLUTION SUBCUTANEOUS at 10:32

## 2024-03-13 RX ADMIN — BUMETANIDE 2 MG: 0.25 INJECTION INTRAMUSCULAR; INTRAVENOUS at 18:48

## 2024-03-13 RX ADMIN — OXYCODONE 5 MG: 5 TABLET ORAL at 12:36

## 2024-03-13 RX ADMIN — DEXTROSE MONOHYDRATE 250 ML: 100 INJECTION, SOLUTION INTRAVENOUS at 15:13

## 2024-03-13 RX ADMIN — DULOXETINE HYDROCHLORIDE 20 MG: 20 CAPSULE, DELAYED RELEASE ORAL at 10:35

## 2024-03-13 RX ADMIN — FAMOTIDINE 40 MG: 20 TABLET, FILM COATED ORAL at 10:34

## 2024-03-13 RX ADMIN — MORPHINE SULFATE 90 MG: 30 TABLET, FILM COATED, EXTENDED RELEASE ORAL at 21:01

## 2024-03-13 RX ADMIN — SODIUM CHLORIDE, PRESERVATIVE FREE 10 ML: 5 INJECTION INTRAVENOUS at 21:03

## 2024-03-13 RX ADMIN — PANTOPRAZOLE SODIUM 40 MG: 40 TABLET, DELAYED RELEASE ORAL at 05:25

## 2024-03-13 RX ADMIN — ASPIRIN 81 MG: 81 TABLET, COATED ORAL at 10:35

## 2024-03-13 RX ADMIN — OXYCODONE 5 MG: 5 TABLET ORAL at 05:20

## 2024-03-13 RX ADMIN — BUMETANIDE 2 MG: 0.25 INJECTION INTRAMUSCULAR; INTRAVENOUS at 10:35

## 2024-03-13 RX ADMIN — ALPRAZOLAM 2 MG: 0.5 TABLET ORAL at 13:42

## 2024-03-13 RX ADMIN — ROSUVASTATIN CALCIUM 40 MG: 20 TABLET, COATED ORAL at 10:35

## 2024-03-13 RX ADMIN — PANTOPRAZOLE SODIUM 40 MG: 40 TABLET, DELAYED RELEASE ORAL at 18:31

## 2024-03-13 RX ADMIN — ALPRAZOLAM 2 MG: 0.5 TABLET ORAL at 21:48

## 2024-03-13 RX ADMIN — ALPRAZOLAM 2 MG: 0.5 TABLET ORAL at 04:24

## 2024-03-13 RX ADMIN — Medication 16 G: at 22:48

## 2024-03-13 RX ADMIN — INSULIN LISPRO 5 UNITS: 100 INJECTION, SOLUTION INTRAVENOUS; SUBCUTANEOUS at 13:19

## 2024-03-13 ASSESSMENT — PAIN SCALES - GENERAL
PAINLEVEL_OUTOF10: 7
PAINLEVEL_OUTOF10: 6
PAINLEVEL_OUTOF10: 0
PAINLEVEL_OUTOF10: 8
PAINLEVEL_OUTOF10: 7
PAINLEVEL_OUTOF10: 8
PAINLEVEL_OUTOF10: 8

## 2024-03-13 ASSESSMENT — PAIN DESCRIPTION - LOCATION
LOCATION: LEG;ABDOMEN
LOCATION: LEG;FOOT
LOCATION: LEG
LOCATION: LEG

## 2024-03-13 ASSESSMENT — PAIN DESCRIPTION - ORIENTATION
ORIENTATION: LEFT
ORIENTATION: LEFT
ORIENTATION: RIGHT;LEFT

## 2024-03-13 ASSESSMENT — PAIN - FUNCTIONAL ASSESSMENT
PAIN_FUNCTIONAL_ASSESSMENT: ACTIVITIES ARE NOT PREVENTED

## 2024-03-13 ASSESSMENT — PAIN DESCRIPTION - DESCRIPTORS
DESCRIPTORS: DISCOMFORT
DESCRIPTORS: CRAMPING
DESCRIPTORS: ACHING

## 2024-03-13 NOTE — ED NOTES
Report given to ADELIA Hernandez. Nurse was informed of reason for arrival, vitals, labs, medications, orders, procedures, results, anything left pending and current plan of action. Questions were asked and received prior to departure from the patient.

## 2024-03-13 NOTE — CONSULTS
[facility]         NAME:Anette Cannon  MRN:[id number]   :1970   Ckd   Edema    She is f/b  as out pt      Plan  Continue bumex 2 mg bid  Follow bmp            Blood pressure 136/68, pulse 70, temperature 98.4 °F (36.9 °C), temperature source Oral, resp. rate 15, height 1.651 m (5' 5\"), weight 78.9 kg (173 lb 15.1 oz), SpO2 99 %.    Patient Active Problem List   Diagnosis    COPD exacerbation (HCC)    Endometriosis    Tobacco abuse    Aspiration pneumonia (HCC)    CKD (chronic kidney disease) stage 3, GFR 30-59 ml/min (HCC)    Essential hypertension    Severe obesity (HCC)    Change in mental status    Abnormal weight gain    Drug overdose    PERLA (acute kidney injury) (HCC)    Respiratory failure, acute (HCC)    Elevated LFTs    Positive blood culture    Hyperlipidemia LDL goal <100    Sepsis (HCC)    Anxiety and depression    Diabetic nephropathy associated with type 1 diabetes mellitus (HCC)    Uncontrolled type I diabetes mellitus with neuropathy    Bilateral leg edema    DKA (diabetic ketoacidosis) (HCC)    Hypoglycemia    Hyponatremia    COVID-19    Type 1 diabetes mellitus with hyperglycemia (HCC)    Hypoglycemia due to type 1 diabetes mellitus (HCC)    Hypoglycemia associated with diabetes (HCC)    Hyperglycemia    Drug-induced Parkinson's disease (HCC)    Benign essential tremor syndrome    Tremors of nervous system    Hyperthyroidism    Diabetic peripheral neuropathy associated with type 1 diabetes mellitus (HCC)    Chronic hypoxic respiratory failure (HCC)    Chronic hypoxemic respiratory failure (HCC)    Acute congestive heart failure, unspecified heart failure type (HCC)                  Acute pulmonary edema (HCC) [J81.0]  Hypoxia [R09.02]  Bilateral lower extremity edema [R60.0]  Acute congestive heart failure, unspecified heart failure type (HCC) [I50.9]     Robert Bennett MD  [facility]  Formerly [facility]  [address], 
0.8 - 3.5 K/UL    Monocytes Absolute 0.6 0.0 - 1.0 K/UL    Eosinophils Absolute 0.3 0.0 - 0.4 K/UL    Basophils Absolute 0.0 0.0 - 0.1 K/UL    Absolute Immature Granulocyte 0.0 0.00 - 0.04 K/UL    Differential Type SMEAR SCANNED      RBC Comment ANISOCYTOSIS  2+        WBC Comment REACTIVE LYMPHS     Magnesium    Collection Time: 03/13/24  3:33 AM   Result Value Ref Range    Magnesium 1.8 1.6 - 2.4 mg/dL   Lipid Panel    Collection Time: 03/13/24  3:33 AM   Result Value Ref Range    Cholesterol, Total 135 <200 MG/DL    Triglycerides 57 <150 MG/DL    HDL 61 MG/DL    LDL Calculated 62.6 0 - 100 MG/DL    VLDL Cholesterol Calculated 11.4 MG/DL    Chol/HDL Ratio 2.2 0.0 - 5.0     POCT Glucose    Collection Time: 03/13/24  3:47 AM   Result Value Ref Range    POC Glucose 130 (H) 65 - 117 mg/dL    Performed by: Stacy Hollins RN    POCT Glucose    Collection Time: 03/13/24  5:22 AM   Result Value Ref Range    POC Glucose 139 (H) 65 - 117 mg/dL    Performed by: Stacy Hollins RN    POCT Glucose    Collection Time: 03/13/24  8:09 AM   Result Value Ref Range    POC Glucose 68 65 - 117 mg/dL    Performed by: Willem SMILEY    POCT Glucose    Collection Time: 03/13/24  8:36 AM   Result Value Ref Range    POC Glucose 97 65 - 117 mg/dL    Performed by: Willem SMILEY    POCT Glucose    Collection Time: 03/13/24 12:06 PM   Result Value Ref Range    POC Glucose 149 (H) 65 - 117 mg/dL    Performed by: Willem Medley DO

## 2024-03-14 ENCOUNTER — APPOINTMENT (OUTPATIENT)
Facility: HOSPITAL | Age: 54
DRG: 291 | End: 2024-03-14
Payer: MEDICARE

## 2024-03-14 VITALS
BODY MASS INDEX: 29.05 KG/M2 | SYSTOLIC BLOOD PRESSURE: 131 MMHG | TEMPERATURE: 97.9 F | HEART RATE: 67 BPM | RESPIRATION RATE: 20 BRPM | HEIGHT: 65 IN | OXYGEN SATURATION: 96 % | WEIGHT: 174.38 LBS | DIASTOLIC BLOOD PRESSURE: 61 MMHG

## 2024-03-14 LAB
ALBUMIN SERPL-MCNC: 2.9 G/DL (ref 3.5–5)
ANION GAP SERPL CALC-SCNC: 3 MMOL/L (ref 5–15)
BASOPHILS # BLD: 0 K/UL (ref 0–0.1)
BASOPHILS NFR BLD: 0 % (ref 0–1)
BUN SERPL-MCNC: 26 MG/DL (ref 6–20)
BUN/CREAT SERPL: 19 (ref 12–20)
CALCIUM SERPL-MCNC: 8.3 MG/DL (ref 8.5–10.1)
CHLORIDE SERPL-SCNC: 97 MMOL/L (ref 97–108)
CO2 SERPL-SCNC: 31 MMOL/L (ref 21–32)
CREAT SERPL-MCNC: 1.38 MG/DL (ref 0.55–1.02)
DIFFERENTIAL METHOD BLD: ABNORMAL
ECHO AO ASC DIAM: 3.1 CM
ECHO AO ASCENDING AORTA INDEX: 1.66 CM/M2
ECHO AV AREA PEAK VELOCITY: 1.8 CM2
ECHO AV AREA VTI: 1.8 CM2
ECHO AV AREA/BSA PEAK VELOCITY: 1 CM2/M2
ECHO AV AREA/BSA VTI: 1 CM2/M2
ECHO AV MEAN GRADIENT: 7 MMHG
ECHO AV MEAN VELOCITY: 1.3 M/S
ECHO AV PEAK GRADIENT: 11 MMHG
ECHO AV PEAK VELOCITY: 1.7 M/S
ECHO AV VELOCITY RATIO: 0.53
ECHO AV VTI: 36.5 CM
ECHO BSA: 1.9 M2
ECHO LA DIAMETER INDEX: 2.03 CM/M2
ECHO LA DIAMETER: 3.8 CM
ECHO LA VOL A-L A2C: 77 ML (ref 22–52)
ECHO LA VOL A-L A4C: 61 ML (ref 22–52)
ECHO LA VOL MOD A2C: 72 ML (ref 22–52)
ECHO LA VOL MOD A4C: 58 ML (ref 22–52)
ECHO LA VOLUME AREA LENGTH: 70 ML
ECHO LA VOLUME INDEX A-L A2C: 41 ML/M2 (ref 16–34)
ECHO LA VOLUME INDEX A-L A4C: 33 ML/M2 (ref 16–34)
ECHO LA VOLUME INDEX AREA LENGTH: 37 ML/M2 (ref 16–34)
ECHO LA VOLUME INDEX MOD A2C: 39 ML/M2 (ref 16–34)
ECHO LA VOLUME INDEX MOD A4C: 31 ML/M2 (ref 16–34)
ECHO LV E' LATERAL VELOCITY: 12 CM/S
ECHO LV E' SEPTAL VELOCITY: 9 CM/S
ECHO LV EDV A4C: 77 ML
ECHO LV EDV INDEX A4C: 41 ML/M2
ECHO LV EJECTION FRACTION A4C: 63 %
ECHO LV ESV A4C: 28 ML
ECHO LV ESV INDEX A4C: 15 ML/M2
ECHO LV FRACTIONAL SHORTENING: 35 % (ref 28–44)
ECHO LV INTERNAL DIMENSION DIASTOLE INDEX: 2.46 CM/M2
ECHO LV INTERNAL DIMENSION DIASTOLIC: 4.6 CM (ref 3.9–5.3)
ECHO LV INTERNAL DIMENSION SYSTOLIC INDEX: 1.6 CM/M2
ECHO LV INTERNAL DIMENSION SYSTOLIC: 3 CM
ECHO LV IVSD: 1.1 CM (ref 0.6–0.9)
ECHO LV MASS 2D: 169.9 G (ref 67–162)
ECHO LV MASS INDEX 2D: 90.8 G/M2 (ref 43–95)
ECHO LV POSTERIOR WALL DIASTOLIC: 1 CM (ref 0.6–0.9)
ECHO LV RELATIVE WALL THICKNESS RATIO: 0.43
ECHO LVOT AREA: 3.1 CM2
ECHO LVOT AV VTI INDEX: 0.56
ECHO LVOT DIAM: 2 CM
ECHO LVOT MEAN GRADIENT: 2 MMHG
ECHO LVOT PEAK GRADIENT: 3 MMHG
ECHO LVOT PEAK VELOCITY: 0.9 M/S
ECHO LVOT STROKE VOLUME INDEX: 34.6 ML/M2
ECHO LVOT SV: 64.7 ML
ECHO LVOT VTI: 20.6 CM
ECHO MV A VELOCITY: 0.97 M/S
ECHO MV AREA VTI: 2.1 CM2
ECHO MV E DECELERATION TIME (DT): 259.1 MS
ECHO MV E VELOCITY: 0.95 M/S
ECHO MV E/A RATIO: 0.98
ECHO MV E/E' LATERAL: 7.92
ECHO MV E/E' RATIO (AVERAGED): 9.24
ECHO MV LVOT VTI INDEX: 1.53
ECHO MV MAX VELOCITY: 1.1 M/S
ECHO MV MEAN GRADIENT: 2 MMHG
ECHO MV MEAN VELOCITY: 0.6 M/S
ECHO MV PEAK GRADIENT: 5 MMHG
ECHO MV VTI: 31.5 CM
ECHO RV FREE WALL PEAK S': 15 CM/S
ECHO RV TAPSE: 2.6 CM (ref 1.7–?)
EOSINOPHIL # BLD: 0.2 K/UL (ref 0–0.4)
EOSINOPHIL NFR BLD: 3 % (ref 0–7)
ERYTHROCYTE [DISTWIDTH] IN BLOOD BY AUTOMATED COUNT: 21.2 % (ref 11.5–14.5)
EST. AVERAGE GLUCOSE BLD GHB EST-MCNC: 217 MG/DL
GLUCOSE BLD STRIP.AUTO-MCNC: 123 MG/DL (ref 65–117)
GLUCOSE BLD STRIP.AUTO-MCNC: 144 MG/DL (ref 65–117)
GLUCOSE BLD STRIP.AUTO-MCNC: 166 MG/DL (ref 65–117)
GLUCOSE BLD STRIP.AUTO-MCNC: 187 MG/DL (ref 65–117)
GLUCOSE BLD STRIP.AUTO-MCNC: 194 MG/DL (ref 65–117)
GLUCOSE BLD STRIP.AUTO-MCNC: 211 MG/DL (ref 65–117)
GLUCOSE BLD STRIP.AUTO-MCNC: 52 MG/DL (ref 65–117)
GLUCOSE BLD STRIP.AUTO-MCNC: 542 MG/DL (ref 65–117)
GLUCOSE BLD STRIP.AUTO-MCNC: 55 MG/DL (ref 65–117)
GLUCOSE BLD STRIP.AUTO-MCNC: 66 MG/DL (ref 65–117)
GLUCOSE BLD STRIP.AUTO-MCNC: 78 MG/DL (ref 65–117)
GLUCOSE SERPL-MCNC: 107 MG/DL (ref 65–100)
HBA1C MFR BLD: 9.2 % (ref 4–5.6)
HCT VFR BLD AUTO: 33.7 % (ref 35–47)
HGB BLD-MCNC: 10.8 G/DL (ref 11.5–16)
IMM GRANULOCYTES # BLD AUTO: 0 K/UL (ref 0–0.04)
IMM GRANULOCYTES NFR BLD AUTO: 0 % (ref 0–0.5)
LYMPHOCYTES # BLD: 3.2 K/UL (ref 0.8–3.5)
LYMPHOCYTES NFR BLD: 45 % (ref 12–49)
MAGNESIUM SERPL-MCNC: 1.9 MG/DL (ref 1.6–2.4)
MCH RBC QN AUTO: 27.5 PG (ref 26–34)
MCHC RBC AUTO-ENTMCNC: 32 G/DL (ref 30–36.5)
MCV RBC AUTO: 85.8 FL (ref 80–99)
MONOCYTES # BLD: 0.7 K/UL (ref 0–1)
MONOCYTES NFR BLD: 10 % (ref 5–13)
NEUTS SEG # BLD: 3 K/UL (ref 1.8–8)
NEUTS SEG NFR BLD: 42 % (ref 32–75)
NRBC # BLD: 0 K/UL (ref 0–0.01)
NRBC BLD-RTO: 0 PER 100 WBC
PHOSPHATE SERPL-MCNC: 5.1 MG/DL (ref 2.6–4.7)
PLATELET # BLD AUTO: 227 K/UL (ref 150–400)
PMV BLD AUTO: 9.3 FL (ref 8.9–12.9)
POTASSIUM SERPL-SCNC: 5.1 MMOL/L (ref 3.5–5.1)
RBC # BLD AUTO: 3.93 M/UL (ref 3.8–5.2)
SERVICE CMNT-IMP: ABNORMAL
SERVICE CMNT-IMP: NORMAL
SERVICE CMNT-IMP: NORMAL
SODIUM SERPL-SCNC: 131 MMOL/L (ref 136–145)
WBC # BLD AUTO: 7.1 K/UL (ref 3.6–11)

## 2024-03-14 PROCEDURE — 6370000000 HC RX 637 (ALT 250 FOR IP): Performed by: INTERNAL MEDICINE

## 2024-03-14 PROCEDURE — 80069 RENAL FUNCTION PANEL: CPT

## 2024-03-14 PROCEDURE — 6370000000 HC RX 637 (ALT 250 FOR IP): Performed by: NURSE PRACTITIONER

## 2024-03-14 PROCEDURE — 2580000003 HC RX 258: Performed by: NURSE PRACTITIONER

## 2024-03-14 PROCEDURE — 93306 TTE W/DOPPLER COMPLETE: CPT

## 2024-03-14 PROCEDURE — 83735 ASSAY OF MAGNESIUM: CPT

## 2024-03-14 PROCEDURE — 36415 COLL VENOUS BLD VENIPUNCTURE: CPT

## 2024-03-14 PROCEDURE — 97116 GAIT TRAINING THERAPY: CPT

## 2024-03-14 PROCEDURE — 85025 COMPLETE CBC W/AUTO DIFF WBC: CPT

## 2024-03-14 PROCEDURE — 6360000002 HC RX W HCPCS: Performed by: NURSE PRACTITIONER

## 2024-03-14 PROCEDURE — 82962 GLUCOSE BLOOD TEST: CPT

## 2024-03-14 RX ORDER — INSULIN GLARGINE 100 [IU]/ML
20 INJECTION, SOLUTION SUBCUTANEOUS DAILY
Status: DISCONTINUED | OUTPATIENT
Start: 2024-03-14 | End: 2024-03-14 | Stop reason: HOSPADM

## 2024-03-14 RX ADMIN — BREXPIPRAZOLE 2 MG: 1 TABLET ORAL at 09:53

## 2024-03-14 RX ADMIN — DEXTROSE MONOHYDRATE 250 ML: 100 INJECTION, SOLUTION INTRAVENOUS at 06:33

## 2024-03-14 RX ADMIN — DULOXETINE HYDROCHLORIDE 20 MG: 20 CAPSULE, DELAYED RELEASE ORAL at 09:55

## 2024-03-14 RX ADMIN — BUMETANIDE 2 MG: 0.25 INJECTION INTRAMUSCULAR; INTRAVENOUS at 09:53

## 2024-03-14 RX ADMIN — ROSUVASTATIN CALCIUM 40 MG: 20 TABLET, COATED ORAL at 09:53

## 2024-03-14 RX ADMIN — PANTOPRAZOLE SODIUM 40 MG: 40 TABLET, DELAYED RELEASE ORAL at 07:18

## 2024-03-14 RX ADMIN — OXYCODONE 5 MG: 5 TABLET ORAL at 12:36

## 2024-03-14 RX ADMIN — ENOXAPARIN SODIUM 40 MG: 100 INJECTION SUBCUTANEOUS at 09:53

## 2024-03-14 RX ADMIN — INSULIN GLARGINE 20 UNITS: 100 INJECTION, SOLUTION SUBCUTANEOUS at 10:42

## 2024-03-14 RX ADMIN — MORPHINE SULFATE 90 MG: 30 TABLET, FILM COATED, EXTENDED RELEASE ORAL at 09:53

## 2024-03-14 RX ADMIN — FAMOTIDINE 40 MG: 20 TABLET, FILM COATED ORAL at 09:54

## 2024-03-14 RX ADMIN — ASPIRIN 81 MG: 81 TABLET, COATED ORAL at 09:55

## 2024-03-14 RX ADMIN — SODIUM CHLORIDE, PRESERVATIVE FREE 10 ML: 5 INJECTION INTRAVENOUS at 09:55

## 2024-03-14 ASSESSMENT — PAIN SCALES - GENERAL
PAINLEVEL_OUTOF10: 10
PAINLEVEL_OUTOF10: 10

## 2024-03-14 NOTE — PLAN OF CARE
Problem: Pain  Goal: Verbalizes/displays adequate comfort level or baseline comfort level  3/14/2024 1305 by Mariah Leiva LPN  Outcome: Adequate for Discharge     Problem: Discharge Planning  Goal: Discharge to home or other facility with appropriate resources  3/14/2024 1305 by Mariah Leiva LPN  Outcome: Adequate for Discharge     Problem: Safety - Adult  Goal: Free from fall injury  3/14/2024 1305 by Mariah Leiva LPN  Outcome: Adequate for Discharge     Problem: Skin/Tissue Integrity  Goal: Absence of new skin breakdown  Description: 1.  Monitor for areas of redness and/or skin breakdown  2.  Assess vascular access sites hourly  3.  Every 4-6 hours minimum:  Change oxygen saturation probe site  4.  Every 4-6 hours:  If on nasal continuous positive airway pressure, respiratory therapy assess nares and determine need for appliance change or resting period.  3/14/2024 1305 by Mariah Leiva LPN  Outcome: Adequate for Discharge     Problem: Respiratory - Adult  Goal: Achieves optimal ventilation and oxygenation  3/14/2024 1305 by Mariah Leiva LPN  Outcome: Adequate for Discharge     Problem: Chronic Conditions and Co-morbidities  Goal: Patient's chronic conditions and co-morbidity symptoms are monitored and maintained or improved  3/14/2024 1305 by Mariah Leiva LPN  Outcome: Adequate for Discharge     Problem: Physical Therapy - Adult  Goal: By Discharge: Performs mobility at highest level of function for planned discharge setting.  See evaluation for individualized goals.  Description: FUNCTIONAL STATUS PRIOR TO ADMISSION: Patient was independent and active without use of DME.  History of one fall last month.  Does not drive but able to shop with SO to transport.    HOME SUPPORT PRIOR TO ADMISSION: The patient lived with SO but did not require assistance.    Physical Therapy Goals  Initiated 3/13/2024  1.  Patient will move from supine to sit and sit to supine in bed with independence within 7 day(s).  
  Problem: Pain  Goal: Verbalizes/displays adequate comfort level or baseline comfort level  Outcome: Progressing     Problem: Discharge Planning  Goal: Discharge to home or other facility with appropriate resources  Outcome: Progressing     Problem: Safety - Adult  Goal: Free from fall injury  Outcome: Progressing     Problem: Skin/Tissue Integrity  Goal: Absence of new skin breakdown  Description: 1.  Monitor for areas of redness and/or skin breakdown  2.  Assess vascular access sites hourly  3.  Every 4-6 hours minimum:  Change oxygen saturation probe site  4.  Every 4-6 hours:  If on nasal continuous positive airway pressure, respiratory therapy assess nares and determine need for appliance change or resting period.  Outcome: Progressing     
  Problem: Physical Therapy - Adult  Goal: By Discharge: Performs mobility at highest level of function for planned discharge setting.  See evaluation for individualized goals.  Description: FUNCTIONAL STATUS PRIOR TO ADMISSION: Patient was independent and active without use of DME.  History of one fall last month.  Does not drive but able to shop with SO to transport.    HOME SUPPORT PRIOR TO ADMISSION: The patient lived with SO but did not require assistance.    Physical Therapy Goals  Initiated 3/13/2024  1.  Patient will move from supine to sit and sit to supine in bed with independence within 7 day(s).    2.  Patient will perform sit to stand with independence within 7 day(s).  3.  Patient will transfer from bed to chair and chair to bed with supervision/set-up using the least restrictive device within 7 day(s).  4.  Patient will ambulate with supervision/set-up for 200 feet with the least restrictive device within 7 day(s).   5.  Patient will ascend/descend 4 stairs with  handrail(s) with supervision/set-up within 7 day(s).    3/13/2024 1035 by Shelly Cano, PT  Outcome: Not Progressing     Problem: Physical Therapy - Adult  Goal: By Discharge: Performs mobility at highest level of function for planned discharge setting.  See evaluation for individualized goals.  Description: FUNCTIONAL STATUS PRIOR TO ADMISSION: Patient was independent and active without use of DME.  History of one fall last month.  Does not drive but able to shop with SO to transport.    HOME SUPPORT PRIOR TO ADMISSION: The patient lived with SO but did not require assistance.    Physical Therapy Goals  Initiated 3/13/2024  1.  Patient will move from supine to sit and sit to supine in bed with independence within 7 day(s).    2.  Patient will perform sit to stand with independence within 7 day(s).  3.  Patient will transfer from bed to chair and chair to bed with supervision/set-up using the least restrictive device within 7 day(s).  4.  
  Problem: Physical Therapy - Adult  Goal: By Discharge: Performs mobility at highest level of function for planned discharge setting.  See evaluation for individualized goals.  Description: FUNCTIONAL STATUS PRIOR TO ADMISSION: Patient was independent and active without use of DME.  History of one fall last month.  Does not drive but able to shop with SO to transport.    HOME SUPPORT PRIOR TO ADMISSION: The patient lived with SO but did not require assistance.    Physical Therapy Goals  Initiated 3/13/2024  1.  Patient will move from supine to sit and sit to supine in bed with independence within 7 day(s).    2.  Patient will perform sit to stand with independence within 7 day(s).  3.  Patient will transfer from bed to chair and chair to bed with supervision/set-up using the least restrictive device within 7 day(s).  4.  Patient will ambulate with supervision/set-up for 200 feet with the least restrictive device within 7 day(s).   5.  Patient will ascend/descend 4 stairs with  handrail(s) with supervision/set-up within 7 day(s).    Outcome: Progressing   PHYSICAL THERAPY TREATMENT    Patient: Anette Cannon (54 y.o. female)  Date: 3/14/2024  Diagnosis: Acute pulmonary edema (HCC) [J81.0]  Hypoxia [R09.02]  Bilateral lower extremity edema [R60.0]  Acute congestive heart failure, unspecified heart failure type (HCC) [I50.9] Acute congestive heart failure, unspecified heart failure type (HCC)      Precautions: Fall Risk                      ASSESSMENT:  Patient continues to benefit from skilled PT services and is progressing towards goals. Patient received up in chair and agreeable to participate, able to come to stand at mod indep level and ambulated x approx 150 feet in hallway with supervision.  Gait steady with mild increased trunk sway and patient reports she feels at her baseline.  Able to march in place, turn and reach without LOB.  Educated on pacing, progression of activity and falls prevention.  Left back 
Patient will ambulate with supervision/set-up for 200 feet with the least restrictive device within 7 day(s).   5.  Patient will ascend/descend 4 stairs with  handrail(s) with supervision/set-up within 7 day(s).    3/13/2024 1035 by Shelly Cano, PT  Outcome: Not Progressing     
addressing body structure, function, activity limitation and / or participation in recreation  LOW Complexity : Stable, uncomplicated  AM-PAC  LOW    Based on the above components, the patient evaluation is determined to be of the following complexity level: Low

## 2024-03-14 NOTE — PROGRESS NOTES
Physician Progress Note      PATIENT:               CLAUDE SPANGLER  CSN #:                  [id number]  :                       1970  ADMIT DATE:       3/12/2024 4:30 PM  DISCH DATE:  RESPONDING  PROVIDER #:        Jenny Rubio MD          QUERY TEXT:    Dr Rubio  Patient admitted with acute/chronic CHF.   Noted documentation of PERLA and CKD   3.  If possible, please document in progress notes and discharge summary if you   are evaluating and /or treating any of the following:      The medical record reflects the following:  Risk Factors: HTN, DM  Clinical Indicators: PERLA noted in HP with History of CKD 3, CRT   1.39-1.26-1.38; GFR 45-51-46  Treatment: serial labs, strict I/Os, Bumex to diurese  Options provided:  -- CKD3 confirmed and PERLA ruled out  -- PERLA confirmed and CKD3 ruled out  -- Other - I will add my own diagnosis  -- Disagree - Not applicable / Not valid  -- Disagree - Clinically unable to determine / Unknown  -- Refer to Clinical Documentation Reviewer    PROVIDER RESPONSE TEXT:    After study, CKD3 confirmed and PERLA ruled out.    Query created by: Gretta Chandler on 3/14/2024 12:20 PM      Electronically signed by:  Jenny Rubio MD 3/14/2024 1:56 PM          
1136 - CARDIOLOGY transitional care appointment has been scheduled with Dr. OBDULIA La on 4/4/24 1015. This is a previously scheduled appt. Pending patient discharge.  Jayashree Islas Care Management Assistant     Hospital follow-up PCP transitional care appointment has been scheduled with LEANN Barker on 3/20/24 1400. This is the first available appt due to limited provider availability. PCP office does not offer alternate provider option for hospital follow up. [facility] placed [facility] information AVS for patient resource. Pending patient discharge. Jayashree Islas Care Management Assistant  
Bedside and Verbal shift change report given to Glenys RN (oncoming nurse) by Karmen RN (offgoing nurse). Report included the following information Nurse Handoff Report, Cardiac Rhythm NSR, Quality Measures, and Neuro Assessment.     
Bedside and Verbal shift change report given to Glenys RN (oncoming nurse) by Karmen RN (offgoing nurse). Report included the following information Nurse Handoff Report, Intake/Output, Recent Results, Cardiac Rhythm NSR, Quality Measures, and Neuro Assessment.     
Bedside shift change report given to  (oncoming nurse) by Mary DELVALLE (offgoing nurse). Report included the following information Nurse Handoff Report, Intake/Output, MAR, Recent Results, and Cardiac Rhythm NSR .    -Pt had a calm night  -Good urine output  -Lower extremities kept elevated  -PRN xanax given  -Labs drawn    At handoff report, pt is awake in bed. No complaints at this time      
Bedside shift change report given to Mariah RICARDO (oncoming nurse) by Glenys DELVALLE (offgoing nurse). Report included the following information Nurse Handoff Report, Index, ED Encounter Summary, ED SBAR, Adult Overview, Surgery Report, Intake/Output, MAR, Recent Results, Med Rec Status, and Cardiac Rhythm (NSR) .      Blood sugar was 52 and a recheck was 55. 10% dextrose iv bolus was given. Recheck after 15 mins was 144    Pt a bedside shift report was awake in bed. No complaints made at this time.  
I saw and examined patient during morning rounds.  Agree with assessment and plan as per H&P  Blood sugar low- decrease lantus from 30 units to 15 units.discontinue pre meal insulin. Continue insulin sliding scale.   
IV removed, AVS reviewed, questions answered. Pt d/c home  
Nephrology Progress Note  [facility] / [place] Office  [address], [address]  [place] [address]  Phone - [phone]  Fax - [phone]                 Patient: Anette Cannon                     YOB: 1970        Date- 3/13/2024                                     Admit Date: 3/12/2024   CC: Follow up for ckd          IMPRESSION & PLAN:   CKD 3 a(baseline creatinine 1.1-1.3)  Diastolic heart failure (EF 65% from 6/23/23)  Anemia  SOB  EDEMA  HYPONATREMIA     PLAN-  CONTINUE BUMEX 2 MG BID  Hold aldactone due to low bp  No acei or arb for now     Subjective:  Interval History:   -  Kassandra is a 54 y.o. female with past medical history CKD, diabetes, endometriosis, GERD, anxiety, PTSD, hypertension who presents c/o bilateral lower extremity swelling and shortness of breath x 1 day.  Patient tells me that she gained 20 pounds - she is drinking GATORADE ZERO at home      Objective:   Vitals:    03/13/24 0520 03/13/24 0550 03/13/24 0752 03/13/24 0911   BP:    114/64   Pulse:    71   Resp: 15 15     Temp:       TempSrc:       SpO2:   99% (!) 87%   Weight:  78.9 kg (173 lb 15.1 oz)     Height:          I/O last 3 completed shifts:  In: 300 [P.O.:300]  Out: 1600 [Urine:1600]  No intake/output data recorded.      Physical exam:    GEN: NAD  NECK- no mass  RESP: No wheezing, decreased BS b/l  CVS: S1,S2  RRR  NEURO: Normal speech, Non focal  EXT: +++ Edema   PSYCH: Normal Mood    Chart reviewed.         Pertinent Notes reviewed.     Data Review :  Lab Results   Component Value Date/Time     03/13/2024 03:33 AM    K 4.8 03/13/2024 03:33 AM     03/13/2024 03:33 AM    CO2 30 03/13/2024 03:33 AM    BUN 26 03/13/2024 03:33 AM    CREATININE 1.26 03/13/2024 03:33 AM    GLUCOSE 137 03/13/2024 03:33 AM    CALCIUM 8.2 03/13/2024 03:33 AM       Lab Results   Component Value Date    WBC 6.9 03/13/2024    HGB 11.0 (L) 03/13/2024    HCT 34.4 
Nephrology Progress Note  [facility] / [place] Office  [address], [address]  [place] [address]  Phone - [phone]  Fax - [phone]                 Patient: Anette Cannon                     YOB: 1970        Date- 3/14/2024                                     Admit Date: 3/12/2024   CC: Follow up for ckd      IMPRESSION & PLAN:   CKD 3 a(baseline creatinine 1.1-1.3)  Diastolic heart failure (EF 65% from 6/23/23)  Anemia  SOB  EDEMA  HYPONATREMIA     PLAN-  CONTINUE BUMEX  2 MG BID  Hold aldactone due to low bp  No acei or arb for now  OKAY TO  D/C RENAL STAND POINT     Subjective:  Interval History:   - 3-14-24-- BP LOW-- She is on room air-- good urine output    3-13-24-- Kassandra is a 54 y.o. female with past medical history CKD, diabetes, endometriosis, GERD, anxiety, PTSD, hypertension who presents c/o bilateral lower extremity swelling and shortness of breath x 1 day.  Patient tells me that she gained 20 pounds - she is drinking GATORADE ZERO at home      Objective:   Vitals:    03/13/24 2101 03/14/24 0144 03/14/24 0224 03/14/24 0600   BP:  (!) 91/51 114/61    Pulse:  61 64    Resp: 18 16 16    Temp:   98.2 °F (36.8 °C)    TempSrc:   Oral    SpO2:  99% 95%    Weight:    79.1 kg (174 lb 6.1 oz)   Height:          I/O last 3 completed shifts:  In: 500 [P.O.:500]  Out: 3350 [Urine:3350]  No intake/output data recorded.      Physical exam:    GEN: NAD  NECK- no mass  RESP: No wheezing, decreased BS b/l  CVS: S1,S2  RRR  NEURO: Normal speech, Non focal  EXT: +++ Edema   PSYCH: Normal Mood    Chart reviewed.         Pertinent Notes reviewed.     Data Review :  Lab Results   Component Value Date/Time     03/14/2024 01:29 AM    K 5.1 03/14/2024 01:29 AM    CL 97 03/14/2024 01:29 AM    CO2 31 03/14/2024 01:29 AM    BUN 26 03/14/2024 01:29 AM    CREATININE 1.38 03/14/2024 01:29 AM    GLUCOSE 107 03/14/2024 01:29 AM    CALCIUM 
OCCUPATIONAL THERAPY EVALUATION/DISCHARGE  Patient: Anette Cannon (54 y.o. female)  Date: 3/13/2024  Primary Diagnosis: Acute pulmonary edema (HCC) [J81.0]  Hypoxia [R09.02]  Bilateral lower extremity edema [R60.0]  Acute congestive heart failure, unspecified heart failure type (HCC) [I50.9]         Precautions: Fall Risk                  ASSESSMENT :  Based on the objective data below, the patient is functioning at her baseline for ADLs and close to her baseline for functional mobility. Patient received sitting in recliner chair on 3L O2 and cleared for therapy by nursing. She completed all ADLs including LB dressing and bathroom ADLs. Patient took off O2 to walk into bathroom with SpO2 remaining >90% with activity. Educated on DME for home safety with patient indicating understanding. Patient was left sitting in recliner chair with all needs met, VSS. She has no additional acute OT needs and does not require OT services at discharge.     Functional Outcome Measure:  The patient scored 24/24 on the [facility] outcome measure which is indicative of patient functioning at her baseline for ADLs.      Further skilled acute occupational therapy is not indicated at this time.     PLAN :    Recommendation for discharge: (in order for the patient to meet his/her long term goals): No skilled occupational therapy    Other factors to consider for discharge: no additional factors    IF patient discharges home will need the following DME:  Discussed w/patient about looking into shower chair vs tub transfer bench     SUBJECTIVE:   Patient stated, “I hope I get out of here soon.”    OBJECTIVE DATA SUMMARY:     Past Medical History:   Diagnosis Date    Achilles tendon rupture     along with torn right patellar/femoral ligament    Arthritis     rt. foot    Chronic kidney disease     Chronic pain     abdominal pain    Diabetes (HCC)     IDDM on insulin pump and sensor    DM type 1 (diabetes mellitus, type 1) ([facility])     age 22    
Patient discharged home via (transportation). IV and telemetry removed. Discharge paperwork provided and patient had no questions. Education provided about daily weights, weight increase of 2 pounds in a day or 3-5 pounds in a week. Education provided about spirolactone and education printed off.   
Pharmacy Medication Reconciliation     The patient was interviewed regarding current PTA medication list, use and drug allergies;  No visitors present in room. The patient was questioned regarding use of any other inhalers, topical products, over the counter medications, herbal medications, vitamin products or ophthalmic/nasal/otic medication use.     Allergy Update: Metolazone, Simvastatin, Abilify [aripiprazole], Gabapentin, Lisinopril, Methylprednisolone, and Reglan [metoclopramide]    Recommendations/Findings:   The following amendments were made to the patient's active medication list on file at [facility]:   1) Additions:   Tiotropium AERS inhaler  Freestyle Joanne 3 sensor    2) Deletions:   Anoro inhaler    3) Changes:   Spironolactone 100 mg 1 PO QD --> spironolactone 50 mg 1 PO TID  Insulin glargine 16-20 units SC QD --> Insulin glargine 30 units SC QD      Pertinent Findings:   No longer on aripiprazole due to worsening tremors  Uses Freestyle Joanne 3 to monitor BG at home and uses meter once in awhile to double check when the BG numbers do not seem right  Not using Anoro inhaler   Was on spironolactone 100 mg 1 PO QD and now on Spironolactone 50 mg TID  Increased dose of glargine (16-20 units to 30 units QD)    Clarified PTA med list with patient. PTA medication list was corrected to the following:     Prior to Admission Medications   Prescriptions Last Dose Informant   ALPRAZolam (XANAX) 2 MG tablet More than a month Self   Sig: Take 1 tablet by mouth. 4 times a daily   Blood Glucose Monitoring Suppl (TRUE METRIX METER) PATRICIA Unknown Self   Sig: Test 10 times daily Dx Code: E10.65   Cholecalciferol (VITAMIN D3) 125 MCG (5000 UT) TABS 3/12/2024 Self   Sig: Take 1 tablet by mouth daily   Continuous Blood Gluc Sensor (FREESTYLE JOANNE 3 SENSOR) MISC 3/13/2024 Self   Sig: by Does not apply route every 14 days   DULoxetine (CYMBALTA) 20 MG extended release capsule 3/12/2024 Self   Sig: Take 1 capsule by mouth daily 
Pt Blood sugar is being monitored over the night.    At 2245 Pt stated that her owned blood sugar kit showed 101. How ever she was jerky and her blood sugar per finger stick was 74 mg/dl, so some glucose tablet 16 g was given. Recheck after 15 mins was 97 mg/dl.     Orange juice was given and she took peanut butter with crackers. Another check was done at 0044 and it was 542 mg/dl. An immediate recheck was done and pt's blood sugar was 187 mg/dl.      Her morning labs were drawn and results of the random blood sugar is pending.    Pt has no symptoms of hyperglycemia.   
Random blood sugar is 107 mg/dl and Vital signs are normal.  
IntraVENous Q6H PRN    polyethylene glycol (GLYCOLAX) packet 17 g  17 g Oral Daily PRN    acetaminophen (TYLENOL) tablet 650 mg  650 mg Oral Q6H PRN    Or    acetaminophen (TYLENOL) suppository 650 mg  650 mg Rectal Q6H PRN    glucose chewable tablet 16 g  4 tablet Oral PRN    dextrose bolus 10% 125 mL  125 mL IntraVENous PRN    Or    dextrose bolus 10% 250 mL  250 mL IntraVENous PRN    glucagon injection 1 mg  1 mg SubCUTAneous PRN    dextrose 10 % infusion   IntraVENous Continuous PRN    insulin lispro (HUMALOG) injection vial 0-4 Units  0-4 Units SubCUTAneous Q4H    bumetanide (BUMEX) injection 2 mg  2 mg IntraVENous BID    brexpiprazole (REXULTI) tablet 2 mg  2 mg Oral Daily    aspirin EC tablet 81 mg  81 mg Oral Daily    DULoxetine (CYMBALTA) extended release capsule 20 mg  20 mg Oral Daily    pantoprazole (PROTONIX) tablet 40 mg  40 mg Oral BID AC    famotidine (PEPCID) tablet 40 mg  40 mg Oral Daily    fluticasone (FLONASE) 50 MCG/ACT nasal spray 1 spray  1 spray Each Nostril Daily PRN    oxyCODONE (ROXICODONE) immediate release tablet 5 mg  5 mg Oral Q4H PRN    rosuvastatin (CRESTOR) tablet 40 mg  40 mg Oral Daily    [Held by provider] spironolactone (ALDACTONE) tablet 100 mg  100 mg Oral Daily    arformoterol tartrate (BROVANA) nebulizer solution 15 mcg  15 mcg Nebulization BID RT    [Held by provider] insulin lispro (HUMALOG) injection vial 5 Units  5 Units SubCUTAneous TID WC    nicotine (NICODERM CQ) 21 MG/24HR 1 patch  1 patch TransDERmal Daily    ALPRAZolam (XANAX) tablet 2 mg  2 mg Oral 4x Daily PRN    morphine (MS CONTIN) extended release tablet 90 mg  90 mg Oral BID    insulin glargine (LANTUS) injection vial 15 Units  15 Units SubCUTAneous Daily         Tevin Medley DO      
99.0 FL    MCH 27.3 26.0 - 34.0 PG    MCHC 31.5 30.0 - 36.5 g/dL    RDW 21.4 (H) 11.5 - 14.5 %    Platelets 265 150 - 400 K/uL    MPV 9.3 8.9 - 12.9 FL    Nucleated RBCs 0.0 0  WBC    nRBC 0.00 0.00 - 0.01 K/uL    Neutrophils % 46 32 - 75 %    Lymphocytes % 43 12 - 49 %    Monocytes % 8 5 - 13 %    Eosinophils % 3 0 - 7 %    Basophils % 0 0 - 1 %    Immature Granulocytes 0 0.0 - 0.5 %    Neutrophils Absolute 3.4 1.8 - 8.0 K/UL    Lymphocytes Absolute 3.2 0.8 - 3.5 K/UL    Monocytes Absolute 0.6 0.0 - 1.0 K/UL    Eosinophils Absolute 0.2 0.0 - 0.4 K/UL    Basophils Absolute 0.0 0.0 - 0.1 K/UL    Absolute Immature Granulocyte 0.0 0.00 - 0.04 K/UL    Differential Type SMEAR SCANNED      Platelet Comment CLUMPED PLATELETS      RBC Comment ANISOCYTOSIS  1+        WBC Comment ATYPICAL LYMPHOCYTES PRESENT     Comprehensive Metabolic Panel    Collection Time: 03/12/24  4:29 PM   Result Value Ref Range    Sodium 137 136 - 145 mmol/L    Potassium 4.6 3.5 - 5.1 mmol/L    Chloride 104 97 - 108 mmol/L    CO2 30 21 - 32 mmol/L    Anion Gap 3 (L) 5 - 15 mmol/L    Glucose 96 65 - 100 mg/dL    BUN 25 (H) 6 - 20 MG/DL    Creatinine 1.39 (H) 0.55 - 1.02 MG/DL    Bun/Cre Ratio 18 12 - 20      Est, Glom Filt Rate 45 (L) >60 ml/min/1.73m2    Calcium 8.6 8.5 - 10.1 MG/DL    Total Bilirubin 0.3 0.2 - 1.0 MG/DL    ALT 30 12 - 78 U/L    AST 21 15 - 37 U/L    Alk Phosphatase 78 45 - 117 U/L    Total Protein 6.6 6.4 - 8.2 g/dL    Albumin 3.3 (L) 3.5 - 5.0 g/dL    Globulin 3.3 2.0 - 4.0 g/dL    Albumin/Globulin Ratio 1.0 (L) 1.1 - 2.2     Troponin    Collection Time: 03/12/24  4:29 PM   Result Value Ref Range    Troponin, High Sensitivity <4 0 - 51 ng/L   Brain Natriuretic Peptide    Collection Time: 03/12/24  4:29 PM   Result Value Ref Range    NT Pro- (H) <125 PG/ML   POCT Glucose    Collection Time: 03/12/24  5:28 PM   Result Value Ref Range    POC Glucose 178 (H) 65 - 117 mg/dL    Performed by: Stephen Frost

## 2024-03-14 NOTE — H&P
Hospitalist Admission Note    NAME:   Anette Cannon   : 1970   MRN: [id number]     Date/Time: 3/13/2024 1:18 AM    Patient PCP: Jair Vieira MD    ______________________________________________________________________  Given the patient's current clinical presentation, I have a high level of concern for decompensation if discharged from the emergency department.  Complex decision making was performed, which includes reviewing the patient's available past medical records, laboratory results, and x-ray films.       My assessment of this patient's clinical condition and my plan of care is as follows.    Assessment / Plan:    Acute on chronic chf exacerbation, w/pulmonary edema and hypoxia- EF 23 65-70% HFpEF,   -admit hospital  -cxr showed mild interstitial edema in er  -s/p bumex 1mg iv in er, continued 2mg bid  -I/o, daily wts  -o2 per protocol, uses o2 prn at home  -cardiology consult ordered- appreciate expertise  -am echo ordered- pending  -am labs    Mild PERLA on CKD baseline GFR 43-59, cret baseline 1.07-1.47  -trend kidney fx  -nephrology consult ordered- appreciate expertise  -hold home spironolactone until seen by nephrology, continue bumex 2mg iv bid    Hypoglycemia with tremors noted on exam, known h/o T1DM, glucose 50 on exam  -nurse to give food/juice and recheck  -home lantus 30 units am- continued  -ssi, hypoglycemia protocol  -accuchecks q4h  -tid w/meals 5 units home NF sub lispro    Chronic Conditions:  T1DM- continuous glucometer sq, see above plan  GERD, h/o gastric ulcer- home pepcid cont, esomeprazole NF- sub protonix  Unspec psychiatric d/o- home duloxetine, rexulti- continued  Htn/chfpEF- home bumex 4mg po bid, spironolactone 100mg daily, asa- plan as above  Chronic pain/Endometriosis/OA (pending hysterectomy once A1c 7.0)-- home morphine, oxy- continued  H/o herpes  HLD- home meds continued  Ckd- follows nephrology outpt  H/o achilles tendon rupture      Medical

## 2024-03-14 NOTE — CARE COORDINATION
CM reviewed chart. PT felt that she may benefit from home therapy or outpatient.     CM met with patient to discuss discharge planning. Discussed PT as an outpatient or home health. Patient declined, stated she will walk more once she gets home. States her boyfriend will provide transportation home. He will help her  any medications she may need. No dc needs identified.    Okay to dc from CM standpoint.     03/14/24 1151   Services At/After Discharge   Transition of Care Consult (CM Consult) Discharge Planning   Services At/After Discharge None   [place] Resource Information Provided? No   Mode of Transport at Discharge Self  (boyfriend to transport after work)   Hospital Transport Time of Discharge 1630   Condition of Participation: Discharge Planning   The Plan for Transition of Care is related to the following treatment goals: Continue to recuperate at home. Will follow up with physicians.     Kathy Coreas RN  Care Manager  [phone]  
including selection of the Healthcare Decision Maker Relationship (ie \"Primary\").   Today we documented Decision Maker(s) consistent with ACP documents on file.    Content/Action Overview:  Has ACP document(s) on file - reflects the patient's care preferences  Reviewed DNR/DNI and patient elects Full Code (Attempt Resuscitation)      Length of Voluntary ACP Conversation in minutes:  <16 minutes (Non-Billable)    Manjula Mares LMSW  Supervisee in Social Work  Care Management, [facility]  [phone]

## 2024-03-14 NOTE — TELEPHONE ENCOUNTER
Pt called again and reports [employer] support  feels her transmitter is bad so over nighting her a new transmitter and 3 new sensors. She will go this weekend with just checking her BS and will get her restarted on Monday with sensor therapy.  She was asked to fully charge her transmitter once it is received for the Monday start    Currently her BS is steady at 190 mg/dl so has not dropped after last bolus but will let me know if it happens and will adjust her ISF
Home

## 2024-03-15 LAB
GLUCOSE BLD STRIP.AUTO-MCNC: NORMAL MG/DL (ref 65–117)
SERVICE CMNT-IMP: NORMAL

## 2024-03-20 ENCOUNTER — TELEPHONE (OUTPATIENT)
Age: 54
End: 2024-03-20

## 2024-03-20 ENCOUNTER — HOSPITAL ENCOUNTER (INPATIENT)
Facility: HOSPITAL | Age: 54
LOS: 7 days | Discharge: HOME | DRG: 644 | End: 2024-03-27
Attending: STUDENT IN AN ORGANIZED HEALTH CARE EDUCATION/TRAINING PROGRAM | Admitting: INTERNAL MEDICINE
Payer: MEDICARE

## 2024-03-20 ENCOUNTER — APPOINTMENT (OUTPATIENT)
Facility: HOSPITAL | Age: 54
DRG: 644 | End: 2024-03-20
Payer: MEDICARE

## 2024-03-20 DIAGNOSIS — F13.20 BENZODIAZEPINE DEPENDENCE (HCC): ICD-10-CM

## 2024-03-20 DIAGNOSIS — F41.1 ANXIETY STATE: ICD-10-CM

## 2024-03-20 DIAGNOSIS — E87.1 HYPONATREMIA: Primary | ICD-10-CM

## 2024-03-20 DIAGNOSIS — I50.9 HEART FAILURE (HCC): ICD-10-CM

## 2024-03-20 DIAGNOSIS — M79.10 MYALGIA: ICD-10-CM

## 2024-03-20 DIAGNOSIS — I50.20 SYSTOLIC CONGESTIVE HEART FAILURE, UNSPECIFIED HF CHRONICITY (HCC): ICD-10-CM

## 2024-03-20 DIAGNOSIS — I50.9 HEART FAILURE, UNSPECIFIED HF CHRONICITY, UNSPECIFIED HEART FAILURE TYPE (HCC): ICD-10-CM

## 2024-03-20 DIAGNOSIS — E11.65 HYPERGLYCEMIA DUE TO DIABETES MELLITUS (HCC): ICD-10-CM

## 2024-03-20 LAB
ALBUMIN SERPL-MCNC: 4.1 G/DL (ref 3.5–5)
ALBUMIN SERPL-MCNC: 4.9 G/DL (ref 3.8–4.9)
ALBUMIN/GLOB SERPL: 1 (ref 1.1–2.2)
ALBUMIN/GLOB SERPL: 1.9 {RATIO} (ref 1.2–2.2)
ALP SERPL-CCNC: 113 IU/L (ref 44–121)
ALP SERPL-CCNC: 120 U/L (ref 45–117)
ALT SERPL-CCNC: 19 IU/L (ref 0–32)
ALT SERPL-CCNC: 30 U/L (ref 12–78)
ANION GAP BLD CALC-SCNC: 4 (ref 10–20)
ANION GAP SERPL CALC-SCNC: 8 MMOL/L (ref 5–15)
APPEARANCE UR: CLEAR
AST SERPL-CCNC: 36 U/L (ref 15–37)
AST SERPL-CCNC: 37 IU/L (ref 0–40)
BACTERIA URNS QL MICRO: NEGATIVE /HPF
BASE EXCESS BLD CALC-SCNC: 11.4 MMOL/L
BASOPHILS # BLD: 0.1 K/UL (ref 0–0.1)
BASOPHILS NFR BLD: 1 % (ref 0–1)
BILIRUB SERPL-MCNC: 0.3 MG/DL (ref 0–1.2)
BILIRUB SERPL-MCNC: 0.4 MG/DL (ref 0.2–1)
BILIRUB UR QL: NEGATIVE
BUN SERPL-MCNC: 55 MG/DL (ref 6–24)
BUN SERPL-MCNC: 58 MG/DL (ref 6–20)
BUN/CREAT SERPL: 23 (ref 12–20)
BUN/CREAT SERPL: 24 (ref 9–23)
CA-I BLD-MCNC: 1.02 MMOL/L (ref 1.12–1.32)
CALCIUM SERPL-MCNC: 9.3 MG/DL (ref 8.5–10.1)
CALCIUM SERPL-MCNC: 9.8 MG/DL (ref 8.7–10.2)
CHLORIDE BLD-SCNC: 76 MMOL/L (ref 100–108)
CHLORIDE SERPL-SCNC: 75 MMOL/L (ref 96–106)
CHLORIDE SERPL-SCNC: 76 MMOL/L (ref 97–108)
CHOLEST SERPL-MCNC: 145 MG/DL (ref 100–199)
CO2 BLD-SCNC: 38 MMOL/L (ref 19–24)
CO2 SERPL-SCNC: 28 MMOL/L (ref 20–29)
CO2 SERPL-SCNC: 33 MMOL/L (ref 21–32)
COLOR UR: NORMAL
CREAT SERPL-MCNC: 2.26 MG/DL (ref 0.57–1)
CREAT SERPL-MCNC: 2.51 MG/DL (ref 0.55–1.02)
CREAT UR-MCNC: 2.4 MG/DL (ref 0.6–1.3)
DIFFERENTIAL METHOD BLD: ABNORMAL
EGFRCR SERPLBLD CKD-EPI 2021: 25 ML/MIN/1.73
EOSINOPHIL # BLD: 0.2 K/UL (ref 0–0.4)
EOSINOPHIL NFR BLD: 2 % (ref 0–7)
EPITH CASTS URNS QL MICRO: NORMAL /LPF
ERYTHROCYTE [DISTWIDTH] IN BLOOD BY AUTOMATED COUNT: 20.7 % (ref 11.5–14.5)
GLOBULIN SER CALC-MCNC: 2.6 G/DL (ref 1.5–4.5)
GLOBULIN SER CALC-MCNC: 4.2 G/DL (ref 2–4)
GLUCOSE BLD STRIP.AUTO-MCNC: 205 MG/DL (ref 65–117)
GLUCOSE BLD STRIP.AUTO-MCNC: 300 MG/DL (ref 74–106)
GLUCOSE BLD STRIP.AUTO-MCNC: 57 MG/DL (ref 65–117)
GLUCOSE BLD STRIP.AUTO-MCNC: 84 MG/DL (ref 65–117)
GLUCOSE SERPL-MCNC: 258 MG/DL (ref 65–100)
GLUCOSE SERPL-MCNC: 274 MG/DL (ref 70–99)
GLUCOSE UR STRIP.AUTO-MCNC: NEGATIVE MG/DL
HBA1C MFR BLD: 9.8 % (ref 4.8–5.6)
HCO3 BLDA-SCNC: 39 MMOL/L
HCT VFR BLD AUTO: 39.6 % (ref 35–47)
HDLC SERPL-MCNC: 78 MG/DL
HGB BLD-MCNC: 13 G/DL (ref 11.5–16)
HGB UR QL STRIP: NEGATIVE
HYALINE CASTS URNS QL MICRO: NORMAL /LPF (ref 0–2)
IMM GRANULOCYTES # BLD AUTO: 0 K/UL (ref 0–0.04)
IMM GRANULOCYTES NFR BLD AUTO: 0 % (ref 0–0.5)
KETONES UR QL STRIP.AUTO: NEGATIVE MG/DL
LACTATE BLD-SCNC: 0.47 MMOL/L (ref 0.4–2)
LDLC SERPL CALC-MCNC: 55 MG/DL (ref 0–99)
LEUKOCYTE ESTERASE UR QL STRIP.AUTO: NEGATIVE
LYMPHOCYTES # BLD: 1.7 K/UL (ref 0.8–3.5)
LYMPHOCYTES NFR BLD: 21 % (ref 12–49)
MAGNESIUM SERPL-MCNC: 2.3 MG/DL (ref 1.6–2.4)
MCH RBC QN AUTO: 27.4 PG (ref 26–34)
MCHC RBC AUTO-ENTMCNC: 32.8 G/DL (ref 30–36.5)
MCV RBC AUTO: 83.4 FL (ref 80–99)
MONOCYTES # BLD: 0.7 K/UL (ref 0–1)
MONOCYTES NFR BLD: 8 % (ref 5–13)
NEUTS SEG # BLD: 5.6 K/UL (ref 1.8–8)
NEUTS SEG NFR BLD: 68 % (ref 32–75)
NITRITE UR QL STRIP.AUTO: NEGATIVE
NRBC # BLD: 0 K/UL (ref 0–0.01)
NRBC BLD-RTO: 0 PER 100 WBC
NT PRO BNP: 193 PG/ML
OSMOLALITY UR: 267 MOSM/KG H2O
PCO2 BLDV: 59.3 MMHG (ref 41–51)
PH BLDV: 7.43 (ref 7.32–7.42)
PH UR STRIP: 6.5 (ref 5–8)
PLATELET # BLD AUTO: 347 K/UL (ref 150–400)
PMV BLD AUTO: 9.4 FL (ref 8.9–12.9)
PO2 BLDV: <27 MMHG (ref 25–40)
POTASSIUM BLD-SCNC: 6.3 MMOL/L (ref 3.5–5.5)
POTASSIUM SERPL-SCNC: 5.4 MMOL/L (ref 3.5–5.1)
POTASSIUM SERPL-SCNC: 5.9 MMOL/L (ref 3.5–5.2)
PROT SERPL-MCNC: 7.5 G/DL (ref 6–8.5)
PROT SERPL-MCNC: 8.3 G/DL (ref 6.4–8.2)
PROT UR STRIP-MCNC: NEGATIVE MG/DL
RBC # BLD AUTO: 4.75 M/UL (ref 3.8–5.2)
RBC #/AREA URNS HPF: NORMAL /HPF (ref 0–5)
RBC MORPH BLD: ABNORMAL
RBC MORPH BLD: ABNORMAL
SERVICE CMNT-IMP: ABNORMAL
SERVICE CMNT-IMP: NORMAL
SODIUM BLD-SCNC: 118 MMOL/L (ref 136–145)
SODIUM SERPL-SCNC: 117 MMOL/L (ref 136–145)
SODIUM SERPL-SCNC: 120 MMOL/L (ref 136–145)
SODIUM SERPL-SCNC: 122 MMOL/L (ref 134–144)
SODIUM UR-SCNC: 40 MMOL/L
SP GR UR REFRACTOMETRY: 1.01
SPECIMEN SITE: ABNORMAL
TRIGL SERPL-MCNC: 60 MG/DL (ref 0–149)
TROPONIN I SERPL HS-MCNC: 4 NG/L (ref 0–51)
TSH SERPL DL<=0.05 MIU/L-ACNC: 8.03 UIU/ML (ref 0.36–3.74)
URINE CULTURE IF INDICATED: NORMAL
UROBILINOGEN UR QL STRIP.AUTO: 0.2 EU/DL (ref 0.2–1)
VLDLC SERPL CALC-MCNC: 12 MG/DL (ref 5–40)
WBC # BLD AUTO: 8.3 K/UL (ref 3.6–11)
WBC MORPH BLD: ABNORMAL
WBC URNS QL MICRO: NORMAL /HPF (ref 0–4)

## 2024-03-20 PROCEDURE — 6360000002 HC RX W HCPCS: Performed by: INTERNAL MEDICINE

## 2024-03-20 PROCEDURE — 84443 ASSAY THYROID STIM HORMONE: CPT

## 2024-03-20 PROCEDURE — 83735 ASSAY OF MAGNESIUM: CPT

## 2024-03-20 PROCEDURE — 2580000003 HC RX 258: Performed by: INTERNAL MEDICINE

## 2024-03-20 PROCEDURE — 82962 GLUCOSE BLOOD TEST: CPT

## 2024-03-20 PROCEDURE — 6370000000 HC RX 637 (ALT 250 FOR IP): Performed by: INTERNAL MEDICINE

## 2024-03-20 PROCEDURE — 80053 COMPREHEN METABOLIC PANEL: CPT

## 2024-03-20 PROCEDURE — 83880 ASSAY OF NATRIURETIC PEPTIDE: CPT

## 2024-03-20 PROCEDURE — 84295 ASSAY OF SERUM SODIUM: CPT

## 2024-03-20 PROCEDURE — 36415 COLL VENOUS BLD VENIPUNCTURE: CPT

## 2024-03-20 PROCEDURE — 83935 ASSAY OF URINE OSMOLALITY: CPT

## 2024-03-20 PROCEDURE — 82947 ASSAY GLUCOSE BLOOD QUANT: CPT

## 2024-03-20 PROCEDURE — 2060000000 HC ICU INTERMEDIATE R&B

## 2024-03-20 PROCEDURE — 99285 EMERGENCY DEPT VISIT HI MDM: CPT

## 2024-03-20 PROCEDURE — 81001 URINALYSIS AUTO W/SCOPE: CPT

## 2024-03-20 PROCEDURE — 94664 DEMO&/EVAL PT USE INHALER: CPT

## 2024-03-20 PROCEDURE — 6370000000 HC RX 637 (ALT 250 FOR IP): Performed by: STUDENT IN AN ORGANIZED HEALTH CARE EDUCATION/TRAINING PROGRAM

## 2024-03-20 PROCEDURE — 94640 AIRWAY INHALATION TREATMENT: CPT

## 2024-03-20 PROCEDURE — 84300 ASSAY OF URINE SODIUM: CPT

## 2024-03-20 PROCEDURE — 85025 COMPLETE CBC W/AUTO DIFF WBC: CPT

## 2024-03-20 PROCEDURE — 84484 ASSAY OF TROPONIN QUANT: CPT

## 2024-03-20 PROCEDURE — 82803 BLOOD GASES ANY COMBINATION: CPT

## 2024-03-20 PROCEDURE — 84132 ASSAY OF SERUM POTASSIUM: CPT

## 2024-03-20 PROCEDURE — 71045 X-RAY EXAM CHEST 1 VIEW: CPT

## 2024-03-20 PROCEDURE — 82330 ASSAY OF CALCIUM: CPT

## 2024-03-20 RX ORDER — SODIUM CHLORIDE 0.9 % (FLUSH) 0.9 %
5-40 SYRINGE (ML) INJECTION PRN
Status: DISCONTINUED | OUTPATIENT
Start: 2024-03-20 | End: 2024-03-27 | Stop reason: HOSPADM

## 2024-03-20 RX ORDER — ONDANSETRON 2 MG/ML
4 INJECTION INTRAMUSCULAR; INTRAVENOUS EVERY 6 HOURS PRN
Status: DISCONTINUED | OUTPATIENT
Start: 2024-03-20 | End: 2024-03-27 | Stop reason: HOSPADM

## 2024-03-20 RX ORDER — ENOXAPARIN SODIUM 100 MG/ML
30 INJECTION SUBCUTANEOUS DAILY
Status: DISCONTINUED | OUTPATIENT
Start: 2024-03-20 | End: 2024-03-23

## 2024-03-20 RX ORDER — BUMETANIDE 1 MG/1
2 TABLET ORAL 2 TIMES DAILY
Status: DISCONTINUED | OUTPATIENT
Start: 2024-03-20 | End: 2024-03-25

## 2024-03-20 RX ORDER — INSULIN GLARGINE 100 [IU]/ML
20 INJECTION, SOLUTION SUBCUTANEOUS DAILY
Status: DISCONTINUED | OUTPATIENT
Start: 2024-03-21 | End: 2024-03-23

## 2024-03-20 RX ORDER — CITALOPRAM 20 MG/1
40 TABLET ORAL DAILY
Status: DISCONTINUED | OUTPATIENT
Start: 2024-03-21 | End: 2024-03-27 | Stop reason: HOSPADM

## 2024-03-20 RX ORDER — ACETAMINOPHEN 650 MG/1
650 SUPPOSITORY RECTAL EVERY 6 HOURS PRN
Status: DISCONTINUED | OUTPATIENT
Start: 2024-03-20 | End: 2024-03-27 | Stop reason: HOSPADM

## 2024-03-20 RX ORDER — ALPRAZOLAM 0.5 MG/1
0.5 TABLET ORAL
Status: DISCONTINUED | OUTPATIENT
Start: 2024-03-20 | End: 2024-03-20

## 2024-03-20 RX ORDER — NEPHROCAP 1 MG
1 CAP ORAL DAILY
Status: DISCONTINUED | OUTPATIENT
Start: 2024-03-21 | End: 2024-03-27 | Stop reason: HOSPADM

## 2024-03-20 RX ORDER — ALPRAZOLAM 0.5 MG/1
2 TABLET ORAL 4 TIMES DAILY PRN
Status: DISCONTINUED | OUTPATIENT
Start: 2024-03-20 | End: 2024-03-27 | Stop reason: HOSPADM

## 2024-03-20 RX ORDER — SODIUM CHLORIDE 9 MG/ML
INJECTION, SOLUTION INTRAVENOUS CONTINUOUS
Status: ACTIVE | OUTPATIENT
Start: 2024-03-20 | End: 2024-03-20

## 2024-03-20 RX ORDER — ACETAMINOPHEN 325 MG/1
650 TABLET ORAL EVERY 6 HOURS PRN
Status: DISCONTINUED | OUTPATIENT
Start: 2024-03-20 | End: 2024-03-27 | Stop reason: HOSPADM

## 2024-03-20 RX ORDER — METOCLOPRAMIDE 10 MG/1
5 TABLET ORAL 3 TIMES DAILY PRN
Status: DISCONTINUED | OUTPATIENT
Start: 2024-03-20 | End: 2024-03-27 | Stop reason: HOSPADM

## 2024-03-20 RX ORDER — ROSUVASTATIN CALCIUM 40 MG/1
40 TABLET, COATED ORAL DAILY
Status: DISCONTINUED | OUTPATIENT
Start: 2024-03-21 | End: 2024-03-27 | Stop reason: HOSPADM

## 2024-03-20 RX ORDER — ALPRAZOLAM 0.5 MG/1
2 TABLET ORAL
Status: COMPLETED | OUTPATIENT
Start: 2024-03-20 | End: 2024-03-20

## 2024-03-20 RX ORDER — OXYCODONE HYDROCHLORIDE 5 MG/1
5 TABLET ORAL EVERY 4 HOURS PRN
Status: DISCONTINUED | OUTPATIENT
Start: 2024-03-20 | End: 2024-03-27 | Stop reason: HOSPADM

## 2024-03-20 RX ORDER — ONDANSETRON 4 MG/1
4 TABLET, ORALLY DISINTEGRATING ORAL EVERY 8 HOURS PRN
Status: DISCONTINUED | OUTPATIENT
Start: 2024-03-20 | End: 2024-03-27 | Stop reason: HOSPADM

## 2024-03-20 RX ORDER — POLYETHYLENE GLYCOL 3350 17 G/17G
17 POWDER, FOR SOLUTION ORAL DAILY PRN
Status: DISCONTINUED | OUTPATIENT
Start: 2024-03-20 | End: 2024-03-27 | Stop reason: HOSPADM

## 2024-03-20 RX ORDER — ASPIRIN 81 MG/1
81 TABLET ORAL DAILY
Status: DISCONTINUED | OUTPATIENT
Start: 2024-03-21 | End: 2024-03-27 | Stop reason: HOSPADM

## 2024-03-20 RX ORDER — SODIUM CHLORIDE 9 MG/ML
INJECTION, SOLUTION INTRAVENOUS PRN
Status: DISCONTINUED | OUTPATIENT
Start: 2024-03-20 | End: 2024-03-27 | Stop reason: HOSPADM

## 2024-03-20 RX ORDER — SODIUM CHLORIDE 0.9 % (FLUSH) 0.9 %
5-40 SYRINGE (ML) INJECTION EVERY 12 HOURS SCHEDULED
Status: DISCONTINUED | OUTPATIENT
Start: 2024-03-20 | End: 2024-03-27 | Stop reason: HOSPADM

## 2024-03-20 RX ADMIN — OXYCODONE 5 MG: 5 TABLET ORAL at 16:55

## 2024-03-20 RX ADMIN — SODIUM CHLORIDE, PRESERVATIVE FREE 10 ML: 5 INJECTION INTRAVENOUS at 20:41

## 2024-03-20 RX ADMIN — IPRATROPIUM BROMIDE 0.5 MG: 0.5 SOLUTION RESPIRATORY (INHALATION) at 19:42

## 2024-03-20 RX ADMIN — MORPHINE SULFATE 105 MG: 30 TABLET, FILM COATED, EXTENDED RELEASE ORAL at 20:38

## 2024-03-20 RX ADMIN — INSULIN HUMAN 10 UNITS: 100 INJECTION, SOLUTION PARENTERAL at 12:09

## 2024-03-20 RX ADMIN — ALPRAZOLAM 2 MG: 0.5 TABLET ORAL at 10:48

## 2024-03-20 RX ADMIN — SODIUM ZIRCONIUM CYCLOSILICATE 10 G: 10 POWDER, FOR SUSPENSION ORAL at 16:51

## 2024-03-20 RX ADMIN — ENOXAPARIN SODIUM 30 MG: 100 INJECTION SUBCUTANEOUS at 16:55

## 2024-03-20 RX ADMIN — SODIUM CHLORIDE: 9 INJECTION, SOLUTION INTRAVENOUS at 16:43

## 2024-03-20 RX ADMIN — BUSPIRONE HYDROCHLORIDE 15 MG: 10 TABLET ORAL at 19:47

## 2024-03-20 RX ADMIN — ALPRAZOLAM 2 MG: 0.5 TABLET ORAL at 19:47

## 2024-03-20 ASSESSMENT — PAIN DESCRIPTION - ORIENTATION: ORIENTATION: LEFT

## 2024-03-20 ASSESSMENT — PAIN DESCRIPTION - DESCRIPTORS
DESCRIPTORS: ACHING

## 2024-03-20 ASSESSMENT — PAIN - FUNCTIONAL ASSESSMENT
PAIN_FUNCTIONAL_ASSESSMENT: 0-10
PAIN_FUNCTIONAL_ASSESSMENT: ACTIVITIES ARE NOT PREVENTED

## 2024-03-20 ASSESSMENT — PAIN SCALES - GENERAL
PAINLEVEL_OUTOF10: 7
PAINLEVEL_OUTOF10: 4
PAINLEVEL_OUTOF10: 5
PAINLEVEL_OUTOF10: 0

## 2024-03-20 ASSESSMENT — PAIN DESCRIPTION - LOCATION
LOCATION: CHEST
LOCATION: VAGINA

## 2024-03-20 ASSESSMENT — PAIN DESCRIPTION - PAIN TYPE: TYPE: CHRONIC PAIN

## 2024-03-20 NOTE — TELEPHONE ENCOUNTER
Informed Mr Maco Lagunas of the message from Dr Schmitt for pt. Mr Lagunas stated she was in the hospital for a few days recwlt due to retaining fluid. Pt had fluid drained and was released on 03/18/2024. He stated she had to call her kidney doctor the next day because she gained 5 lbs of fluid over night. Mr Lagunas states he will try to contact pt and have her go to the ER.

## 2024-03-20 NOTE — CONSULTS
(VOLTAREN) 1 % GEL  Self Yes No   Sig: Apply 2 g topically 4 times daily as needed   esomeprazole (NEXIUM) 40 MG delayed release capsule   Yes No   Sig: Take 1 capsule by mouth in the morning and at bedtime   fluticasone (FLONASE) 50 MCG/ACT nasal spray   Yes No   Si spray by Each Nostril route daily as needed for Rhinitis   glucagon, rDNA, (GLUCAGEN HYPOKIT) 1 MG SOLR injection   No No   Sig: USE AS DIRECTED IN KIT INSTRUCTION   insulin glargine (LANTUS) 100 UNIT/ML injection vial   No No   Sig: Inject 16 units in the AM when active and 20 units when not active--Dose change 24--updated med list--did not send prescription to the pharmacy   Patient taking differently: Inject 30 Units into the skin daily   levonorgestrel (MIRENA, 52 MG,) IUD 52 mg   Yes No   Si each by IntraUTERine route once   metoclopramide (REGLAN) 5 MG tablet   No No   Sig: TAKE ONE TABLET BY MOUTH TWICE A DAY   naloxone 4 MG/0.1ML LIQD nasal spray  Self Yes No   Sig: Use 1 spray intranasally, then discard. Repeat with new spray every 2 min as needed for opioid overdose symptoms, alternating nostrils.   oxyCODONE (ROXICODONE) 5 MG immediate release tablet   Yes No   Sig: Take 1 tablet by mouth every 4 hours as needed.   rosuvastatin (CRESTOR) 40 MG tablet  Self Yes No   Sig: Take 1 tablet by mouth daily   spironolactone (ALDACTONE) 50 MG tablet   Yes No   Sig: Take 1 tablet by mouth in the morning, at noon, and at bedtime   tiotropium (SPIRIVA RESPIMAT) 2.5 MCG/ACT AERS inhaler   Yes No   Sig: Inhale 2 puffs into the lungs daily      Facility-Administered Medications: None         Imaging:    Medications list Personally Reviewed   [x]      Yes     []               No    Thank you for allowing us to participate in the care this patient.   We will follow patient with you.  Signed By: Hector Horta MD  [facility]  [facility] Office  [address], [address]  [place] [address]  Phone - [phone]  Phone - [phone]         Fax - [phone] [address] [address]  Phone - [phone]        Fax - [phone]

## 2024-03-20 NOTE — ED NOTES
Patient states she is hurting from her endometriosis. Patient cannot say where but is asking for pain meds. She states she has dilaudid and oxy ordered. Patient is somnolent and easily awakened. Will inform MD.

## 2024-03-20 NOTE — PROGRESS NOTES
End of Shift Note    Bedside shift change report given to ADELIA Victoria (oncoming nurse) by Corina Santamaria RN (offgoing nurse).  Report included the following information SBAR, Kardex, and MAR    Shift worked:  Days     Shift summary and any significant changes:     Pt transferred from ED for hyponatremia. Pt on 1200 ml fl restriction. Pt AOx4, ambulatory to the room, Ra, NSR on monitor. Pt has bilateral Joanne sensors on her upper extremities, pt is afraid to remove either because she's not sure which one is tracking her BG. Admission database completed. Nephrology and Endocrinology consulted. Pt has RLE brace. NS IVF imitated at 100 mL/hr. Can be stopped at 2144. Q6 Na ordered and drawn at 1718, next Na due around 2300. PRN Roxicodone given x1. Urine sodium & osmolality ordered.      Concerns for physician to address:  -     Zone phone for oncoming shift:   -       Length of Stay:  Expected LOS: 3  Actual LOS: 0      Corina Santamaria RN

## 2024-03-20 NOTE — TELEPHONE ENCOUNTER
Maco Lagunas [facility] stating he was able to reach pt and she has called the ambulance.  Anette also left a voice message stating she was in the ambulance on her way to [facility].

## 2024-03-20 NOTE — H&P
Hospitalist Admission Note    NAME:   Anette Cannon   : 1970   MRN: [id number]     Date/Time: 3/20/2024 3:17 PM    Patient PCP: Jair Vieira MD    ______________________________________________________________________  Given the patient's current clinical presentation, I have a high level of concern for decompensation if discharged from the emergency department.  Complex decision making was performed, which includes reviewing the patient's available past medical records, laboratory results, and x-ray films.       My assessment of this patient's clinical condition and my plan of care is as follows.    Assessment / Plan:      Hyponatremia, sodium 117  PERLA, CKD stage III  Diastolic heart failure    -Reported 10 pounds weight gain, currently weight 164 LB, which seems to be close to her baseline  -Baseline creatinine around 1.2-1.4  -Presented with sodium 117, creatinine 2.51  -K5.4  Admit to stepdown  -Consult nephrology to assist with this complex case  -Get urine sodium/urine osmolality  -Bumex 2 mg p.o. twice daily, strict I's and O's, daily weight  -Hold Aldactone  -Give Lokelma once  -Avoid nephrotoxic medication      Type 1 diabetes mellitus, SSI  Lantus 20 units daily  Consult endocrine      Depression  Chronic pain, chronic opioid use  -Takes morphine 100 mg long-acting twice daily  -Takes Xanax 2 mg 4 times a day      Medical Decision Making:   I personally reviewed labs: CBC/BMP  I personally reviewed imaging:  I personally reviewed EKG:  Toxic drug monitoring:   Discussed case with: ED provider. After discussion I am in agreement that acuity of patient's medical condition necessitates hospital stay.      Code Status: Full code  DVT Prophylaxis: Lovenox  Baseline:     Subjective:   CHIEF COMPLAINT: Leg swelling    HISTORY OF PRESENT ILLNESS:     Anette Cannon is a 54 y.o.  female with PMHx significant for diabetes mellitus, CKD, GERD, hypertension, chronic pain, HLD who presented to ED with  Negative NEG      Urobilinogen, Urine 0.2 0.2 - 1.0 EU/dL    Nitrite, Urine Negative NEG      Leukocyte Esterase, Urine Negative NEG      Urine Culture if Indicated CULTURE NOT INDICATED BY UA RESULT      WBC, UA 0-4 0 - 4 /hpf    RBC, UA 0-5 0 - 5 /hpf    Epithelial Cells UA FEW FEW /lpf    BACTERIA, URINE Negative NEG /hpf    Hyaline Casts, UA 0-2 0 - 2 /lpf   POCT Glucose    Collection Time: 03/20/24  1:00 PM   Result Value Ref Range    POC Glucose 205 (H) 65 - 117 mg/dL    Performed by: Lamin Fallon RN          XR CHEST PORTABLE    Result Date: 3/20/2024  EXAM:  XR CHEST PORTABLE INDICATION: Chest pain and shortness of breath COMPARISON: 3/12/2024 TECHNIQUE: Portable AP chest view at 1109 hours FINDINGS: The cardiac silhouette is within normal limits. The pulmonary vasculature is within normal limits. The lungs and pleural spaces are clear. There is no pneumothorax. The visualized bones and upper abdomen are age-appropriate.     No acute process.     Echo (TTE) complete (PRN contrast/bubble/strain/3D)    Result Date: 3/14/2024    Left Ventricle: Normal left ventricular systolic function with a visually estimated EF of 55 - 60%. Left ventricle size is normal. Mildly increased wall thickness. Normal wall motion.   Right Ventricle: Right ventricle size is normal. Normal systolic function.   Interatrial Septum: Probable closure device without shunting with doppler     Vascular duplex lower extremity venous bilateral    Result Date: 3/12/2024    No evidence of acute deep vein thrombosis in the right lower extremity. No evidence of deep vein or superficial vein thrombosis in the right lower extremity. Vessels demonstrate normal compressibility, color filling, and phasic and spontaneous flow.   No evidence of acute deep vein thrombosis in the left lower extremity. No evidence of deep vein or superficial vein thrombosis in the left lower extremity. Vessels demonstrate normal compressibility, color filling, and

## 2024-03-20 NOTE — TELEPHONE ENCOUNTER
Left voice message asking pt to go to the ER immediately as Dr Schmitt reviewed her labs and her sodium is dangerously low. Asked pt to call office back to let us know when she receives the message.

## 2024-03-20 NOTE — TELEPHONE ENCOUNTER
Please call to let her know I just got her labs back from yesterday and her sodium is dangerously low at 122 and she needs to go to the ER right away to get this treated as she has had this happen intermittently over the past year and it's very low again.  Thanks!

## 2024-03-21 ENCOUNTER — APPOINTMENT (OUTPATIENT)
Facility: HOSPITAL | Age: 54
DRG: 644 | End: 2024-03-21
Payer: MEDICARE

## 2024-03-21 LAB
ALBUMIN SERPL-MCNC: 3.1 G/DL (ref 3.5–5)
ANION GAP SERPL CALC-SCNC: 9 MMOL/L (ref 5–15)
BUN SERPL-MCNC: 56 MG/DL (ref 6–20)
BUN/CREAT SERPL: 28 (ref 12–20)
CALCIUM SERPL-MCNC: 8.4 MG/DL (ref 8.5–10.1)
CHLORIDE SERPL-SCNC: 83 MMOL/L (ref 97–108)
CO2 SERPL-SCNC: 30 MMOL/L (ref 21–32)
CREAT SERPL-MCNC: 2.02 MG/DL (ref 0.55–1.02)
ERYTHROCYTE [DISTWIDTH] IN BLOOD BY AUTOMATED COUNT: 20.3 % (ref 11.5–14.5)
GLUCOSE BLD STRIP.AUTO-MCNC: 109 MG/DL (ref 65–117)
GLUCOSE BLD STRIP.AUTO-MCNC: 132 MG/DL (ref 65–117)
GLUCOSE BLD STRIP.AUTO-MCNC: 191 MG/DL (ref 65–117)
GLUCOSE BLD STRIP.AUTO-MCNC: 295 MG/DL (ref 65–117)
GLUCOSE BLD STRIP.AUTO-MCNC: 305 MG/DL (ref 65–117)
GLUCOSE BLD STRIP.AUTO-MCNC: 310 MG/DL (ref 65–117)
GLUCOSE SERPL-MCNC: 123 MG/DL (ref 65–100)
HCT VFR BLD AUTO: 33.8 % (ref 35–47)
HGB BLD-MCNC: 11 G/DL (ref 11.5–16)
MCH RBC QN AUTO: 27.2 PG (ref 26–34)
MCHC RBC AUTO-ENTMCNC: 32.5 G/DL (ref 30–36.5)
MCV RBC AUTO: 83.5 FL (ref 80–99)
NRBC # BLD: 0 K/UL (ref 0–0.01)
NRBC BLD-RTO: 0 PER 100 WBC
PHOSPHATE SERPL-MCNC: 4.6 MG/DL (ref 2.6–4.7)
PLATELET # BLD AUTO: 301 K/UL (ref 150–400)
PMV BLD AUTO: 9.2 FL (ref 8.9–12.9)
POTASSIUM SERPL-SCNC: 4.3 MMOL/L (ref 3.5–5.1)
RBC # BLD AUTO: 4.05 M/UL (ref 3.8–5.2)
SERVICE CMNT-IMP: ABNORMAL
SERVICE CMNT-IMP: NORMAL
SODIUM SERPL-SCNC: 118 MMOL/L (ref 136–145)
SODIUM SERPL-SCNC: 120 MMOL/L (ref 136–145)
SODIUM SERPL-SCNC: 122 MMOL/L (ref 136–145)
URATE SERPL-MCNC: 7 MG/DL (ref 2.6–6)
WBC # BLD AUTO: 7.2 K/UL (ref 3.6–11)

## 2024-03-21 PROCEDURE — 2580000003 HC RX 258: Performed by: INTERNAL MEDICINE

## 2024-03-21 PROCEDURE — 2700000000 HC OXYGEN THERAPY PER DAY

## 2024-03-21 PROCEDURE — 6360000002 HC RX W HCPCS: Performed by: INTERNAL MEDICINE

## 2024-03-21 PROCEDURE — 94640 AIRWAY INHALATION TREATMENT: CPT

## 2024-03-21 PROCEDURE — 84295 ASSAY OF SERUM SODIUM: CPT

## 2024-03-21 PROCEDURE — 82962 GLUCOSE BLOOD TEST: CPT

## 2024-03-21 PROCEDURE — 2060000000 HC ICU INTERMEDIATE R&B

## 2024-03-21 PROCEDURE — 99221 1ST HOSP IP/OBS SF/LOW 40: CPT | Performed by: INTERNAL MEDICINE

## 2024-03-21 PROCEDURE — 84550 ASSAY OF BLOOD/URIC ACID: CPT

## 2024-03-21 PROCEDURE — 80069 RENAL FUNCTION PANEL: CPT

## 2024-03-21 PROCEDURE — 71045 X-RAY EXAM CHEST 1 VIEW: CPT

## 2024-03-21 PROCEDURE — 2580000003 HC RX 258: Performed by: NURSE PRACTITIONER

## 2024-03-21 PROCEDURE — 6370000000 HC RX 637 (ALT 250 FOR IP): Performed by: INTERNAL MEDICINE

## 2024-03-21 PROCEDURE — 36415 COLL VENOUS BLD VENIPUNCTURE: CPT

## 2024-03-21 PROCEDURE — 85027 COMPLETE CBC AUTOMATED: CPT

## 2024-03-21 RX ORDER — INSULIN LISPRO 100 [IU]/ML
0-4 INJECTION, SOLUTION INTRAVENOUS; SUBCUTANEOUS NIGHTLY
Status: DISCONTINUED | OUTPATIENT
Start: 2024-03-21 | End: 2024-03-21

## 2024-03-21 RX ORDER — GLUCAGON 1 MG/ML
1 KIT INJECTION PRN
Status: DISCONTINUED | OUTPATIENT
Start: 2024-03-21 | End: 2024-03-27 | Stop reason: HOSPADM

## 2024-03-21 RX ORDER — INSULIN LISPRO 100 [IU]/ML
0-8 INJECTION, SOLUTION INTRAVENOUS; SUBCUTANEOUS
Status: DISCONTINUED | OUTPATIENT
Start: 2024-03-21 | End: 2024-03-21

## 2024-03-21 RX ORDER — 3% SODIUM CHLORIDE 3 G/100ML
40 INJECTION, SOLUTION INTRAVENOUS CONTINUOUS
Status: DISCONTINUED | OUTPATIENT
Start: 2024-03-21 | End: 2024-03-21

## 2024-03-21 RX ORDER — INSULIN LISPRO 100 [IU]/ML
0-8 INJECTION, SOLUTION INTRAVENOUS; SUBCUTANEOUS
Status: DISCONTINUED | OUTPATIENT
Start: 2024-03-22 | End: 2024-03-25

## 2024-03-21 RX ORDER — 3% SODIUM CHLORIDE 3 G/100ML
160 INJECTION, SOLUTION INTRAVENOUS CONTINUOUS
Status: DISPENSED | OUTPATIENT
Start: 2024-03-21 | End: 2024-03-22

## 2024-03-21 RX ORDER — INSULIN LISPRO 100 [IU]/ML
4 INJECTION, SOLUTION INTRAVENOUS; SUBCUTANEOUS
Status: DISCONTINUED | OUTPATIENT
Start: 2024-03-21 | End: 2024-03-22

## 2024-03-21 RX ORDER — 3% SODIUM CHLORIDE 3 G/100ML
160 INJECTION, SOLUTION INTRAVENOUS CONTINUOUS
Status: DISCONTINUED | OUTPATIENT
Start: 2024-03-21 | End: 2024-03-21

## 2024-03-21 RX ORDER — DEXTROSE MONOHYDRATE 100 MG/ML
INJECTION, SOLUTION INTRAVENOUS CONTINUOUS PRN
Status: DISCONTINUED | OUTPATIENT
Start: 2024-03-21 | End: 2024-03-27 | Stop reason: HOSPADM

## 2024-03-21 RX ORDER — INSULIN LISPRO 100 [IU]/ML
0-5 INJECTION, SOLUTION INTRAVENOUS; SUBCUTANEOUS NIGHTLY
Status: DISCONTINUED | OUTPATIENT
Start: 2024-03-21 | End: 2024-03-27 | Stop reason: HOSPADM

## 2024-03-21 RX ORDER — BUMETANIDE 0.25 MG/ML
2 INJECTION INTRAMUSCULAR; INTRAVENOUS 2 TIMES DAILY
Status: DISCONTINUED | OUTPATIENT
Start: 2024-03-21 | End: 2024-03-24

## 2024-03-21 RX ADMIN — ALPRAZOLAM 2 MG: 0.5 TABLET ORAL at 08:25

## 2024-03-21 RX ADMIN — ASPIRIN 81 MG: 81 TABLET, COATED ORAL at 08:25

## 2024-03-21 RX ADMIN — SODIUM CHLORIDE 160 ML: 3 INJECTION, SOLUTION INTRAVENOUS at 20:35

## 2024-03-21 RX ADMIN — ROSUVASTATIN CALCIUM 40 MG: 40 TABLET, FILM COATED ORAL at 08:22

## 2024-03-21 RX ADMIN — IPRATROPIUM BROMIDE 0.5 MG: 0.5 SOLUTION RESPIRATORY (INHALATION) at 18:11

## 2024-03-21 RX ADMIN — ACETAMINOPHEN 650 MG: 325 TABLET ORAL at 19:02

## 2024-03-21 RX ADMIN — BREXPIPRAZOLE 2 MG: 1 TABLET ORAL at 08:45

## 2024-03-21 RX ADMIN — OXYCODONE 5 MG: 5 TABLET ORAL at 04:03

## 2024-03-21 RX ADMIN — INSULIN LISPRO 6 UNITS: 100 INJECTION, SOLUTION INTRAVENOUS; SUBCUTANEOUS at 10:32

## 2024-03-21 RX ADMIN — BUSPIRONE HYDROCHLORIDE 15 MG: 10 TABLET ORAL at 20:18

## 2024-03-21 RX ADMIN — INSULIN LISPRO 4 UNITS: 100 INJECTION, SOLUTION INTRAVENOUS; SUBCUTANEOUS at 13:00

## 2024-03-21 RX ADMIN — ALPRAZOLAM 2 MG: 0.5 TABLET ORAL at 19:02

## 2024-03-21 RX ADMIN — SODIUM CHLORIDE, PRESERVATIVE FREE 10 ML: 5 INJECTION INTRAVENOUS at 20:26

## 2024-03-21 RX ADMIN — NEPHROCAP 1 MG: 1 CAP ORAL at 08:25

## 2024-03-21 RX ADMIN — BUSPIRONE HYDROCHLORIDE 15 MG: 10 TABLET ORAL at 13:00

## 2024-03-21 RX ADMIN — Medication 30 G: at 10:29

## 2024-03-21 RX ADMIN — INSULIN GLARGINE 20 UNITS: 100 INJECTION, SOLUTION SUBCUTANEOUS at 08:22

## 2024-03-21 RX ADMIN — BUMETANIDE 2 MG: 0.25 INJECTION INTRAMUSCULAR; INTRAVENOUS at 20:18

## 2024-03-21 RX ADMIN — INSULIN LISPRO 4 UNITS: 100 INJECTION, SOLUTION INTRAVENOUS; SUBCUTANEOUS at 16:38

## 2024-03-21 RX ADMIN — OXYCODONE 5 MG: 5 TABLET ORAL at 12:52

## 2024-03-21 RX ADMIN — OXYCODONE 5 MG: 5 TABLET ORAL at 19:02

## 2024-03-21 RX ADMIN — IPRATROPIUM BROMIDE 0.5 MG: 0.5 SOLUTION RESPIRATORY (INHALATION) at 08:57

## 2024-03-21 RX ADMIN — CITALOPRAM HYDROBROMIDE 40 MG: 20 TABLET ORAL at 08:24

## 2024-03-21 RX ADMIN — ACETAMINOPHEN 650 MG: 325 TABLET ORAL at 08:22

## 2024-03-21 RX ADMIN — OXYCODONE 5 MG: 5 TABLET ORAL at 08:25

## 2024-03-21 RX ADMIN — BUSPIRONE HYDROCHLORIDE 15 MG: 10 TABLET ORAL at 08:27

## 2024-03-21 RX ADMIN — MORPHINE SULFATE 105 MG: 30 TABLET, FILM COATED, EXTENDED RELEASE ORAL at 20:18

## 2024-03-21 RX ADMIN — SODIUM CHLORIDE, PRESERVATIVE FREE 10 ML: 5 INJECTION INTRAVENOUS at 08:26

## 2024-03-21 RX ADMIN — MORPHINE SULFATE 105 MG: 30 TABLET, FILM COATED, EXTENDED RELEASE ORAL at 08:24

## 2024-03-21 RX ADMIN — ENOXAPARIN SODIUM 30 MG: 100 INJECTION SUBCUTANEOUS at 08:23

## 2024-03-21 RX ADMIN — ALPRAZOLAM 2 MG: 0.5 TABLET ORAL at 13:37

## 2024-03-21 ASSESSMENT — PAIN SCALES - GENERAL
PAINLEVEL_OUTOF10: 7
PAINLEVEL_OUTOF10: 8
PAINLEVEL_OUTOF10: 0
PAINLEVEL_OUTOF10: 7
PAINLEVEL_OUTOF10: 6
PAINLEVEL_OUTOF10: 0
PAINLEVEL_OUTOF10: 6
PAINLEVEL_OUTOF10: 0
PAINLEVEL_OUTOF10: 0
PAINLEVEL_OUTOF10: 6

## 2024-03-21 ASSESSMENT — PAIN DESCRIPTION - DESCRIPTORS
DESCRIPTORS: CRAMPING
DESCRIPTORS: CRAMPING
DESCRIPTORS: ACHING

## 2024-03-21 ASSESSMENT — PAIN SCALES - WONG BAKER: WONGBAKER_NUMERICALRESPONSE: NO HURT

## 2024-03-21 ASSESSMENT — PAIN DESCRIPTION - ORIENTATION
ORIENTATION: LEFT
ORIENTATION: RIGHT;LEFT
ORIENTATION: RIGHT;LEFT

## 2024-03-21 ASSESSMENT — PAIN DESCRIPTION - LOCATION: LOCATION: OTHER (COMMENT)

## 2024-03-21 NOTE — PROGRESS NOTES
Nephrology Progress Note  [facility] / [place] Office  [address], [address]  [place] [address]  Phone - [phone]  Fax - [phone]                 Patient: Anette Cannon                     YOB: 1970        Date- 3/21/2024                                     Admit Date: 3/20/2024   CC: Follow up for hyponatremia          IMPRESSION & PLAN:   Hyponatremia(suspect overdiuresis, SIADH, IVVD)  PERLA stage II(suspect secondary to overdiuresis, volume depletion)  CKD stage III(baseline 1.1-1.2)  Hypovolemia  Hypokalemia  Metabolic alkalosis  Type 1 diabetes  Chronic pain syndrome      PLAN-  Consulted cardiology, requesting right heart cath to assess volume status  Add urea powder daily  Continue to check sodium every 6 hours  Renal indicis and electrolytes much improved after IV fluids yesterday.   I will encourage p.o. intake  Spoke to medicine team     Subjective:  Interval History:   -Seen and examined today  -Feels much better this morning  -Chest x-ray is clear without any pulmonary edema    Objective:   Vitals:    03/21/24 0854 03/21/24 0855 03/21/24 0857 03/21/24 1038   BP:    124/76   Pulse:    69   Resp: 14 14  11   Temp:    98.4 °F (36.9 °C)   TempSrc:    Oral   SpO2:   95% 95%   Weight:       Height:          I/O last 3 completed shifts:  In: 1486 [P.O.:986; I.V.:500]  Out: 1700 [Urine:1700]  I/O this shift:  In: 10 [I.V.:10]  Out: -       Physical exam:    GEN: NAD  NECK- no mass  RESP: No wheezing, decreased BS b/l  CVS: S1,S2  RRR  NEURO: Normal speech, Non focal  EXT: No Edema   PSYCH: Normal Mood    Chart reviewed.         Pertinent Notes reviewed.     Data Review :  Lab Results   Component Value Date/Time     03/21/2024 05:35 AM    K 4.3 03/21/2024 12:09 AM    CL 83 03/21/2024 12:09 AM    CO2 30 03/21/2024 12:09 AM    BUN 56 03/21/2024 12:09 AM    CREATININE 2.02 03/21/2024 12:09 AM    GLUCOSE 123 03/21/2024  12:09 AM    GLUCOSE 274 03/19/2024 08:35 AM    CALCIUM 8.4 03/21/2024 12:09 AM       Lab Results   Component Value Date    WBC 7.2 03/21/2024    HGB 11.0 (L) 03/21/2024    HCT 33.8 (L) 03/21/2024    MCV 83.5 03/21/2024     03/21/2024      Recent Labs     03/19/24  0835 03/20/24  1053 03/20/24  1057 03/20/24  1718 03/21/24  0009 03/21/24  0535   *  --  117* 120* 122* 120*   K 5.9*  --  5.4*  --  4.3  --    CL 75*  --  76*  --  83*  --    CO2 28  --  33*  --  30  --    GLUCOSE 274*  --  258*  --  123*  --    BUN 55*  --  58*  --  56*  --    CREATININE 2.26* 2.4* 2.51*  --  2.02*  --    CALCIUM 9.8  --  9.3  --  8.4*  --        Recent Labs     03/20/24  1057 03/21/24  0009   WBC 8.3 7.2   RBC 4.75 4.05   HGB 13.0 11.0*   HCT 39.6 33.8*   MCV 83.4 83.5   MCH 27.4 27.2   MCHC 32.8 32.5   RDW 20.7* 20.3*    301   MPV 9.4 9.2     Lab Results   Component Value Date/Time    IRON 17 (L) 06/26/2023 05:13 PM    TIBC 345 06/26/2023 05:13 PM     No results found for: \"PTH\"  Lab Results   Component Value Date/Time    LABA1C 9.8 (H) 03/19/2024 08:35 AM     Lab Results   Component Value Date/Time    COLORU YELLOW/STRAW 03/20/2024 11:46 AM    CLARITYU CLOUDY (A) 04/25/2023 01:47 PM    GLUCOSEU Negative 03/20/2024 11:46 AM    BILIRUBINUR Negative 03/20/2024 11:46 AM    BILIRUBINUR Negative 04/25/2023 01:47 PM    KETUA Negative 03/20/2024 11:46 AM    SPECGRAV 1.007 03/20/2024 11:46 AM    BLOODU Negative 03/20/2024 11:46 AM    PHUR 5.0 04/25/2023 01:47 PM    PROTEINU Negative 03/20/2024 11:46 AM    NITRU Negative 03/20/2024 11:46 AM    LEUKOCYTESUR Negative 03/20/2024 11:46 AM     US Results (most recent):  Medication list  reviewed  Current Facility-Administered Medications   Medication Dose Route Frequency    insulin lispro (HUMALOG) injection vial 0-8 Units  0-8 Units SubCUTAneous TID     insulin lispro (HUMALOG) injection vial 0-4 Units  0-4 Units SubCUTAneous Nightly    glucose chewable tablet 16 g  4 tablet

## 2024-03-21 NOTE — CONSULTS
Endocrinology Consult    Asked by Dr. Bennett to see this clinic patient of mine for type 1 DM, hyperglycemia.    I came by to see Anette at lunch today around 1:10pm.  She was due to see me in the office on Tuesday 3/19/24 but I had to cancel due to a family emergency and have rescheduled her for Thurs 3/28/24 at 10:10am.  She went and had pre-labs drawn for this visit on 3/19/24 and her sodium came back very low at 122 so we called her yesterday to come to the ER to be admitted and her sodium was down to 117 in the ER.  She has been seen by nephrology who thought she had been overdiuresed and has given her fluid back and sodium up to 122 in midnight but down to 120 at 5:35am today.  She was given 10 units of regular insulin yesterday at noon for a blood sugar of 300 at 11am and her sugar went down to 57 by 5pm and then was up to 84 20 minutes later.  It was up to 191 this morning and she was given 20 units of lantus but no insulin for her breakfast and her sugar was up to 310 at 11am and was given 4 units at that time and her sugar was down to 132 by dinner tonight.  She states her appetite is not as good at this time and normally takes 5-6 units for meal time insulin but we agreed to just give her 4 units for now.    Assessment/Plan:  1) Type 1 diabetes with hyperglycemia: her most recent Hgb A1c was 9.8% in 3/24.  I think her current dose of lantus is reasonable but she needs to have insulin to cover her food and will benefit from a less aggressive correction scale for humalog to avoid hypoglycemia.  - cont lantus 20 units daily  - dose humalog 4 units before meals  - dose humalog 1:50 > 200 for correction during the day  - dose humalog 2 units for 201-250 + 1:50 >250 for correction at bedtime    Thanks for the consult.  Please don't hesitate to send me a message through perfect serve with any questions or concerns.    I spent 40 minutes of face to face time on her case and > 50% of the time was spent reviewing

## 2024-03-21 NOTE — PLAN OF CARE
Problem: Discharge Planning  Goal: Discharge to home or other facility with appropriate resources  Outcome: Progressing  Flowsheets  Taken 3/20/2024 1908 by Esme Abarca RN  Discharge to home or other facility with appropriate resources:   Identify barriers to discharge with patient and caregiver   Arrange for needed discharge resources and transportation as appropriate   Identify discharge learning needs (meds, wound care, etc)   Arrange for interpreters to assist at discharge as needed   Refer to discharge planning if patient needs post-hospital services based on physician order or complex needs related to functional status, cognitive ability or social support system  Taken 3/20/2024 1610 by Corina Santamaria RN  Discharge to home or other facility with appropriate resources:   Identify barriers to discharge with patient and caregiver   Arrange for needed discharge resources and transportation as appropriate   Identify discharge learning needs (meds, wound care, etc)     Problem: Pain  Goal: Verbalizes/displays adequate comfort level or baseline comfort level  Outcome: Progressing  Flowsheets  Taken 3/20/2024 1908 by Esme Abarca RN  Verbalizes/displays adequate comfort level or baseline comfort level:   Encourage patient to monitor pain and request assistance   Assess pain using appropriate pain scale   Administer analgesics based on type and severity of pain and evaluate response   Implement non-pharmacological measures as appropriate and evaluate response   Consider cultural and social influences on pain and pain management   Notify Licensed Independent Practitioner if interventions unsuccessful or patient reports new pain  Taken 3/20/2024 1610 by Corina Santamaria RN  Verbalizes/displays adequate comfort level or baseline comfort level:   Encourage patient to monitor pain and request assistance   Assess pain using appropriate pain scale   Administer analgesics based on type and severity of pain and

## 2024-03-21 NOTE — PROGRESS NOTES
Hospitalist Progress Note    NAME:   Anette Cannon   : 1970   MRN: [id number]     Date/Time: 3/21/2024 2:55 PM  Patient PCP: Jair Vieira MD    Estimated discharge date: 3/23  Barriers: Improvement of sodium, right heart cath, clinical improvement      Assessment / Plan:    Hyponatremia likely due to overdiuresis, SIADH  -Presented with a sodium of 117 and sodium is improving and is currently around 120.  -Discussed with nephrology today.  Hold off on IV fluids.  Continue urea.  Check serial sodium.  -Cardiology consulted for right heart cath.  -Check TSH level    Acute kidney injury  -Baseline creatinine is around 1.4.  Presented with a creatinine of 2.5.  Creatinine now trending down and is 2  -Minimize nephrotoxic medications.  No IV fluids due to hyponatremia.  Check BMP in a.m. tomorrow.  -Discussed with nephrology today    Diabetes mellitus type 2  -Continue Lantus 20 units at night.  Add insulin lispro 4 units 3 times daily.  Continue sliding scale insulin.  -Currently blood sugars are elevated more than 250    High TSH concerning for hypothyroidism  -TSH is elevated 8.  Check free T4.    Depression  Chronic narcotic use  Hypertension  GERD  Dyslipidemia  -Continue PTA BuSpar, Celexa  -Continue PTA oxycodone and Xanax  -Holding home Aldactone due to acute kidney injury  -Continue PTA PPI and Crestor    Medical Decision Making:   I personally reviewed labs: CBC, BMP  I personally reviewed imaging: Chest x-ray  I personally reviewed EKG:  Toxic drug monitoring:   Discussed case with: Nephrology regarding hyponatremia        Code Status: Full code  DVT Prophylaxis: Lovenox  GI Prophylaxis:    Subjective:     Chief Complaint / Reason for Physician Visit  She reports that shortness of breath is not any worse.  She claims that she gained 10 pounds.  Does not report any swelling of the legs.  No cough or phlegm  No chest pain  Overnight events noted      Objective:     VITALS:   Last 24hrs VS

## 2024-03-21 NOTE — CARE COORDINATION
Care Management Initial Assessment       RUR: 32 % High  Readmission? Yes - 3/20/2024  1st IM letter given? Yes - 3/20/24  1st  letter given: No    Chart reviewed. CM met with patient at bedside. Patient is a readmission who had been previously admitted for the CHF exacerbation and now hyponatremia. The patient reports having a 10 lb gain in one day and having been instructed by her provider to take additional diuretic which resulted in hyponatremia. The patient was instructed by her endocrinologist to return to the hospital.  The patient lives in a 1 level home with 4 stairs her boyfriend. The patient uses supplemental oxygen via [employer] but owns no other DME. She denies SNF/IPR/HH. CM confirmed the patient's PCP.  The patient will be transported by her boyfriend at discharge and uses the [facility] on [address]. She has ACP docs on file. CM will continue to follow.     03/21/24 4560   Service Assessment   Patient Orientation Alert and Oriented   Cognition Alert   History Provided By Patient   Primary Caregiver Self   Support Systems Spouse/Significant Other  (Duke Muniz [phone] boyfriend)   Patient's Healthcare Decision Maker is: Named in Scanned ACP Document   PCP Verified by CM Yes   Last Visit to PCP Within last 3 months   Prior Functional Level Independent in ADLs/IADLs   Current Functional Level Independent in ADLs/IADLs   Ability to make needs known: Good   Family able to assist with home care needs: Other (comment)  (Duke Muniz [phone] boyfriend)   Financial Resources Medicare;Medicaid   Community Resources None   CM/SW Referral ADLs/IADLs;Activity/Exercise   Social/Functional History   Lives With Significant other   Type of Home House   Home Layout One level   Home Access Stairs to enter with rails   Entrance Stairs - Number of Steps 4   Entrance Stairs - Rails Both   Bathroom Toilet Standard   Bathroom Equipment None   Home Equipment Oxygen  (via [employer])   Receives Help From Other  (comment)  (Duke Muniz [phone] boyfriend)   ADL Assistance Independent   Homemaking Assistance Independent   Ambulation Assistance Independent   Transfer Assistance Independent   Active  No   Patient's  Info Duke Muniz [phone] boyfriend   Mode of Transportation Car   Occupation On disability   Discharge Planning   Living Arrangements Spouse/Significant Other   Current Services Prior To Admission Oxygen Therapy   Potential Assistance Needed Durable Medical Equipment   Potential DME Needed   (per therapy)   DME Ordered? No   Type of Home Care Services PT;RT;OT        03/21/24 1710   Readmission Assessment   Number of Days since last admission? 1-7 days   Previous Disposition Outpatient Rehab   Who is being Interviewed Patient   What was the patient's/caregiver's perception as to why they think they needed to return back to the hospital? Other (Comment)  (patient had 10 lb weight gain in one day)   Did you visit your Primary Care Physician after you left the hospital, before you returned this time? Yes   Did you see a specialist, such as Cardiac, Pulmonary, Orthopedic Physician, etc. after you left the hospital? Yes  (cardiology and nephrology)   Who advised the patient to return to the hospital? Physician  (Endocrinologist)   Does the patient report anything that got in the way of taking their medications? No   In our efforts to provide the best possible care to you and others like you, can you think of anything that we could have done to help you after you left the hospital the first time, so that you might not have needed to return so soon? Teaching during hospitalization regarding your illness  (strategies to stay out of the hospital; medication adjustment)       STEVE Mccrary, RN, CM  [facility]  X [id number]

## 2024-03-21 NOTE — PROGRESS NOTES
Notified provider, SANDRA Bennett, of critical Na+ level of 118.   Dr. Bennett gave instruction to put in an order for   3% sodium (Hypertonic solution) at 40ml/hr for 11hrs. STOP solution when Na+ = or > 124.    BMP lab Q4H.   Report results Q4H to overnight provider Dr. Putnam.      Orders taken via Telephone w/ readback.     Annabel Jack RN

## 2024-03-22 LAB
ANION GAP SERPL CALC-SCNC: 4 MMOL/L (ref 5–15)
ANION GAP SERPL CALC-SCNC: 4 MMOL/L (ref 5–15)
ANION GAP SERPL CALC-SCNC: 5 MMOL/L (ref 5–15)
ANION GAP SERPL CALC-SCNC: 5 MMOL/L (ref 5–15)
BASOPHILS # BLD: 0.1 K/UL (ref 0–0.1)
BASOPHILS NFR BLD: 1 % (ref 0–1)
BUN SERPL-MCNC: 61 MG/DL (ref 6–20)
BUN SERPL-MCNC: 69 MG/DL (ref 6–20)
BUN SERPL-MCNC: 76 MG/DL (ref 6–20)
BUN SERPL-MCNC: 81 MG/DL (ref 6–20)
BUN/CREAT SERPL: 37 (ref 12–20)
BUN/CREAT SERPL: 38 (ref 12–20)
BUN/CREAT SERPL: 47 (ref 12–20)
BUN/CREAT SERPL: 50 (ref 12–20)
CALCIUM SERPL-MCNC: 8.2 MG/DL (ref 8.5–10.1)
CALCIUM SERPL-MCNC: 8.4 MG/DL (ref 8.5–10.1)
CALCIUM SERPL-MCNC: 9 MG/DL (ref 8.5–10.1)
CALCIUM SERPL-MCNC: 9.1 MG/DL (ref 8.5–10.1)
CHLORIDE SERPL-SCNC: 85 MMOL/L (ref 97–108)
CHLORIDE SERPL-SCNC: 86 MMOL/L (ref 97–108)
CHLORIDE SERPL-SCNC: 88 MMOL/L (ref 97–108)
CHLORIDE SERPL-SCNC: 88 MMOL/L (ref 97–108)
CO2 SERPL-SCNC: 29 MMOL/L (ref 21–32)
CO2 SERPL-SCNC: 29 MMOL/L (ref 21–32)
CO2 SERPL-SCNC: 30 MMOL/L (ref 21–32)
CO2 SERPL-SCNC: 30 MMOL/L (ref 21–32)
CREAT SERPL-MCNC: 1.61 MG/DL (ref 0.55–1.02)
CREAT SERPL-MCNC: 1.63 MG/DL (ref 0.55–1.02)
CREAT SERPL-MCNC: 1.65 MG/DL (ref 0.55–1.02)
CREAT SERPL-MCNC: 1.83 MG/DL (ref 0.55–1.02)
DIFFERENTIAL METHOD BLD: ABNORMAL
ECHO BSA: 1.85 M2
EOSINOPHIL # BLD: 0.1 K/UL (ref 0–0.4)
EOSINOPHIL NFR BLD: 2 % (ref 0–7)
ERYTHROCYTE [DISTWIDTH] IN BLOOD BY AUTOMATED COUNT: 20.3 % (ref 11.5–14.5)
GLUCOSE BLD STRIP.AUTO-MCNC: 118 MG/DL (ref 65–117)
GLUCOSE BLD STRIP.AUTO-MCNC: 145 MG/DL (ref 65–117)
GLUCOSE BLD STRIP.AUTO-MCNC: 200 MG/DL (ref 65–117)
GLUCOSE BLD STRIP.AUTO-MCNC: 235 MG/DL (ref 65–117)
GLUCOSE BLD STRIP.AUTO-MCNC: 310 MG/DL (ref 65–117)
GLUCOSE BLD STRIP.AUTO-MCNC: 426 MG/DL (ref 65–117)
GLUCOSE SERPL-MCNC: 151 MG/DL (ref 65–100)
GLUCOSE SERPL-MCNC: 208 MG/DL (ref 65–100)
GLUCOSE SERPL-MCNC: 209 MG/DL (ref 65–100)
GLUCOSE SERPL-MCNC: 452 MG/DL (ref 65–100)
HCT VFR BLD AUTO: 35.1 % (ref 35–47)
HGB BLD-MCNC: 11.7 G/DL (ref 11.5–16)
IMM GRANULOCYTES # BLD AUTO: 0 K/UL (ref 0–0.04)
IMM GRANULOCYTES NFR BLD AUTO: 0 % (ref 0–0.5)
LYMPHOCYTES # BLD: 2.7 K/UL (ref 0.8–3.5)
LYMPHOCYTES NFR BLD: 48 % (ref 12–49)
MCH RBC QN AUTO: 28.3 PG (ref 26–34)
MCHC RBC AUTO-ENTMCNC: 33.3 G/DL (ref 30–36.5)
MCV RBC AUTO: 84.8 FL (ref 80–99)
MONOCYTES # BLD: 0.6 K/UL (ref 0–1)
MONOCYTES NFR BLD: 11 % (ref 5–13)
NEUTS SEG # BLD: 2.1 K/UL (ref 1.8–8)
NEUTS SEG NFR BLD: 38 % (ref 32–75)
NRBC # BLD: 0 K/UL (ref 0–0.01)
NRBC BLD-RTO: 0 PER 100 WBC
PLATELET # BLD AUTO: 293 K/UL (ref 150–400)
PMV BLD AUTO: 9.4 FL (ref 8.9–12.9)
POTASSIUM SERPL-SCNC: 4.5 MMOL/L (ref 3.5–5.1)
POTASSIUM SERPL-SCNC: 4.6 MMOL/L (ref 3.5–5.1)
POTASSIUM SERPL-SCNC: 4.8 MMOL/L (ref 3.5–5.1)
POTASSIUM SERPL-SCNC: 5.3 MMOL/L (ref 3.5–5.1)
RBC # BLD AUTO: 4.14 M/UL (ref 3.8–5.2)
RBC MORPH BLD: ABNORMAL
RBC MORPH BLD: ABNORMAL
SERVICE CMNT-IMP: ABNORMAL
SODIUM SERPL-SCNC: 120 MMOL/L (ref 136–145)
SODIUM SERPL-SCNC: 120 MMOL/L (ref 136–145)
SODIUM SERPL-SCNC: 121 MMOL/L (ref 136–145)
SODIUM SERPL-SCNC: 122 MMOL/L (ref 136–145)
WBC # BLD AUTO: 5.6 K/UL (ref 3.6–11)

## 2024-03-22 PROCEDURE — 6360000002 HC RX W HCPCS: Performed by: INTERNAL MEDICINE

## 2024-03-22 PROCEDURE — 4A023N6 MEASUREMENT OF CARDIAC SAMPLING AND PRESSURE, RIGHT HEART, PERCUTANEOUS APPROACH: ICD-10-PCS | Performed by: STUDENT IN AN ORGANIZED HEALTH CARE EDUCATION/TRAINING PROGRAM

## 2024-03-22 PROCEDURE — 6370000000 HC RX 637 (ALT 250 FOR IP): Performed by: INTERNAL MEDICINE

## 2024-03-22 PROCEDURE — 99231 SBSQ HOSP IP/OBS SF/LOW 25: CPT | Performed by: INTERNAL MEDICINE

## 2024-03-22 PROCEDURE — 2580000003 HC RX 258: Performed by: NURSE PRACTITIONER

## 2024-03-22 PROCEDURE — 85025 COMPLETE CBC W/AUTO DIFF WBC: CPT

## 2024-03-22 PROCEDURE — 2580000003 HC RX 258: Performed by: INTERNAL MEDICINE

## 2024-03-22 PROCEDURE — 2709999900 HC NON-CHARGEABLE SUPPLY: Performed by: STUDENT IN AN ORGANIZED HEALTH CARE EDUCATION/TRAINING PROGRAM

## 2024-03-22 PROCEDURE — 80048 BASIC METABOLIC PNL TOTAL CA: CPT

## 2024-03-22 PROCEDURE — 99153 MOD SED SAME PHYS/QHP EA: CPT | Performed by: STUDENT IN AN ORGANIZED HEALTH CARE EDUCATION/TRAINING PROGRAM

## 2024-03-22 PROCEDURE — 36415 COLL VENOUS BLD VENIPUNCTURE: CPT

## 2024-03-22 PROCEDURE — B2141ZZ FLUOROSCOPY OF RIGHT HEART USING LOW OSMOLAR CONTRAST: ICD-10-PCS | Performed by: STUDENT IN AN ORGANIZED HEALTH CARE EDUCATION/TRAINING PROGRAM

## 2024-03-22 PROCEDURE — 6360000002 HC RX W HCPCS: Performed by: STUDENT IN AN ORGANIZED HEALTH CARE EDUCATION/TRAINING PROGRAM

## 2024-03-22 PROCEDURE — 2700000000 HC OXYGEN THERAPY PER DAY

## 2024-03-22 PROCEDURE — 94640 AIRWAY INHALATION TREATMENT: CPT

## 2024-03-22 PROCEDURE — C1894 INTRO/SHEATH, NON-LASER: HCPCS | Performed by: STUDENT IN AN ORGANIZED HEALTH CARE EDUCATION/TRAINING PROGRAM

## 2024-03-22 PROCEDURE — 82962 GLUCOSE BLOOD TEST: CPT

## 2024-03-22 PROCEDURE — 2060000000 HC ICU INTERMEDIATE R&B

## 2024-03-22 PROCEDURE — C1725 CATH, TRANSLUMIN NON-LASER: HCPCS | Performed by: STUDENT IN AN ORGANIZED HEALTH CARE EDUCATION/TRAINING PROGRAM

## 2024-03-22 PROCEDURE — 84439 ASSAY OF FREE THYROXINE: CPT

## 2024-03-22 PROCEDURE — 93451 RIGHT HEART CATH: CPT | Performed by: STUDENT IN AN ORGANIZED HEALTH CARE EDUCATION/TRAINING PROGRAM

## 2024-03-22 PROCEDURE — 2500000003 HC RX 250 WO HCPCS: Performed by: STUDENT IN AN ORGANIZED HEALTH CARE EDUCATION/TRAINING PROGRAM

## 2024-03-22 PROCEDURE — B2111ZZ FLUOROSCOPY OF MULTIPLE CORONARY ARTERIES USING LOW OSMOLAR CONTRAST: ICD-10-PCS | Performed by: STUDENT IN AN ORGANIZED HEALTH CARE EDUCATION/TRAINING PROGRAM

## 2024-03-22 PROCEDURE — 99152 MOD SED SAME PHYS/QHP 5/>YRS: CPT | Performed by: STUDENT IN AN ORGANIZED HEALTH CARE EDUCATION/TRAINING PROGRAM

## 2024-03-22 RX ORDER — 3% SODIUM CHLORIDE 3 G/100ML
40 INJECTION, SOLUTION INTRAVENOUS ONCE
Status: DISCONTINUED | OUTPATIENT
Start: 2024-03-22 | End: 2024-03-22

## 2024-03-22 RX ORDER — INSULIN LISPRO 100 [IU]/ML
5 INJECTION, SOLUTION INTRAVENOUS; SUBCUTANEOUS
Status: DISCONTINUED | OUTPATIENT
Start: 2024-03-23 | End: 2024-03-25

## 2024-03-22 RX ORDER — FENTANYL CITRATE 50 UG/ML
INJECTION, SOLUTION INTRAMUSCULAR; INTRAVENOUS PRN
Status: DISCONTINUED | OUTPATIENT
Start: 2024-03-22 | End: 2024-03-22 | Stop reason: HOSPADM

## 2024-03-22 RX ORDER — INSULIN LISPRO 100 [IU]/ML
6 INJECTION, SOLUTION INTRAVENOUS; SUBCUTANEOUS
Status: DISCONTINUED | OUTPATIENT
Start: 2024-03-22 | End: 2024-03-22

## 2024-03-22 RX ORDER — 3% SODIUM CHLORIDE 3 G/100ML
40 INJECTION, SOLUTION INTRAVENOUS CONTINUOUS
Status: DISPENSED | OUTPATIENT
Start: 2024-03-22 | End: 2024-03-22

## 2024-03-22 RX ORDER — HEPARIN SODIUM 10000 [USP'U]/ML
INJECTION, SOLUTION INTRAVENOUS; SUBCUTANEOUS PRN
Status: DISCONTINUED | OUTPATIENT
Start: 2024-03-22 | End: 2024-03-22 | Stop reason: HOSPADM

## 2024-03-22 RX ORDER — INSULIN LISPRO 100 [IU]/ML
4 INJECTION, SOLUTION INTRAVENOUS; SUBCUTANEOUS ONCE
Status: COMPLETED | OUTPATIENT
Start: 2024-03-22 | End: 2024-03-22

## 2024-03-22 RX ORDER — INSULIN LISPRO 100 [IU]/ML
8 INJECTION, SOLUTION INTRAVENOUS; SUBCUTANEOUS ONCE
Status: COMPLETED | OUTPATIENT
Start: 2024-03-22 | End: 2024-03-22

## 2024-03-22 RX ORDER — LIDOCAINE HYDROCHLORIDE 10 MG/ML
INJECTION, SOLUTION INFILTRATION; PERINEURAL PRN
Status: DISCONTINUED | OUTPATIENT
Start: 2024-03-22 | End: 2024-03-22 | Stop reason: HOSPADM

## 2024-03-22 RX ADMIN — MORPHINE SULFATE 105 MG: 30 TABLET, FILM COATED, EXTENDED RELEASE ORAL at 21:33

## 2024-03-22 RX ADMIN — OXYCODONE 5 MG: 5 TABLET ORAL at 19:59

## 2024-03-22 RX ADMIN — SODIUM CHLORIDE 40 ML/HR: 3 INJECTION, SOLUTION INTRAVENOUS at 10:49

## 2024-03-22 RX ADMIN — MORPHINE SULFATE 105 MG: 30 TABLET, FILM COATED, EXTENDED RELEASE ORAL at 10:25

## 2024-03-22 RX ADMIN — ROSUVASTATIN CALCIUM 40 MG: 40 TABLET, FILM COATED ORAL at 10:26

## 2024-03-22 RX ADMIN — SODIUM CHLORIDE, PRESERVATIVE FREE 10 ML: 5 INJECTION INTRAVENOUS at 21:33

## 2024-03-22 RX ADMIN — BUSPIRONE HYDROCHLORIDE 15 MG: 10 TABLET ORAL at 21:33

## 2024-03-22 RX ADMIN — BUSPIRONE HYDROCHLORIDE 15 MG: 10 TABLET ORAL at 10:26

## 2024-03-22 RX ADMIN — INSULIN LISPRO 3 UNITS: 100 INJECTION, SOLUTION INTRAVENOUS; SUBCUTANEOUS at 12:24

## 2024-03-22 RX ADMIN — BUMETANIDE 2 MG: 0.25 INJECTION INTRAMUSCULAR; INTRAVENOUS at 21:32

## 2024-03-22 RX ADMIN — IPRATROPIUM BROMIDE 0.5 MG: 0.5 SOLUTION RESPIRATORY (INHALATION) at 07:16

## 2024-03-22 RX ADMIN — NEPHROCAP 1 MG: 1 CAP ORAL at 10:26

## 2024-03-22 RX ADMIN — IPRATROPIUM BROMIDE 0.5 MG: 0.5 SOLUTION RESPIRATORY (INHALATION) at 20:08

## 2024-03-22 RX ADMIN — BREXPIPRAZOLE 2 MG: 1 TABLET ORAL at 09:51

## 2024-03-22 RX ADMIN — SODIUM CHLORIDE 40 ML/HR: 3 INJECTION, SOLUTION INTRAVENOUS at 03:08

## 2024-03-22 RX ADMIN — CITALOPRAM HYDROBROMIDE 40 MG: 20 TABLET ORAL at 10:26

## 2024-03-22 RX ADMIN — INSULIN LISPRO 8 UNITS: 100 INJECTION, SOLUTION INTRAVENOUS; SUBCUTANEOUS at 15:22

## 2024-03-22 RX ADMIN — INSULIN LISPRO 4 UNITS: 100 INJECTION, SOLUTION INTRAVENOUS; SUBCUTANEOUS at 19:48

## 2024-03-22 RX ADMIN — BUMETANIDE 2 MG: 0.25 INJECTION INTRAMUSCULAR; INTRAVENOUS at 10:25

## 2024-03-22 RX ADMIN — BUSPIRONE HYDROCHLORIDE 15 MG: 10 TABLET ORAL at 14:21

## 2024-03-22 RX ADMIN — OXYCODONE 5 MG: 5 TABLET ORAL at 15:00

## 2024-03-22 RX ADMIN — INSULIN GLARGINE 20 UNITS: 100 INJECTION, SOLUTION SUBCUTANEOUS at 10:22

## 2024-03-22 RX ADMIN — Medication 30 G: at 10:40

## 2024-03-22 RX ADMIN — INSULIN LISPRO 4 UNITS: 100 INJECTION, SOLUTION INTRAVENOUS; SUBCUTANEOUS at 10:22

## 2024-03-22 RX ADMIN — ALPRAZOLAM 2 MG: 0.5 TABLET ORAL at 18:35

## 2024-03-22 RX ADMIN — ASPIRIN 81 MG: 81 TABLET, COATED ORAL at 10:26

## 2024-03-22 RX ADMIN — ALPRAZOLAM 2 MG: 0.5 TABLET ORAL at 10:40

## 2024-03-22 RX ADMIN — INSULIN LISPRO 4 UNITS: 100 INJECTION, SOLUTION INTRAVENOUS; SUBCUTANEOUS at 12:24

## 2024-03-22 RX ADMIN — SODIUM CHLORIDE, PRESERVATIVE FREE 10 ML: 5 INJECTION INTRAVENOUS at 10:26

## 2024-03-22 ASSESSMENT — PAIN SCALES - GENERAL
PAINLEVEL_OUTOF10: 8
PAINLEVEL_OUTOF10: 5
PAINLEVEL_OUTOF10: 0
PAINLEVEL_OUTOF10: 3
PAINLEVEL_OUTOF10: 4

## 2024-03-22 ASSESSMENT — PAIN DESCRIPTION - LOCATION
LOCATION: ABDOMEN

## 2024-03-22 ASSESSMENT — PAIN DESCRIPTION - ORIENTATION
ORIENTATION: ANTERIOR
ORIENTATION: ANTERIOR
ORIENTATION: LOWER;MID

## 2024-03-22 ASSESSMENT — PAIN DESCRIPTION - DESCRIPTORS
DESCRIPTORS: ACHING
DESCRIPTORS: ACHING
DESCRIPTORS: ACHING;DISCOMFORT;CRAMPING

## 2024-03-22 ASSESSMENT — PAIN DESCRIPTION - PAIN TYPE
TYPE: CHRONIC PAIN
TYPE: CHRONIC PAIN

## 2024-03-22 NOTE — CARDIO/PULMONARY
Chart reviewed: Patient is 54 y.o. female admitted with Hyponatremia [E87.1]    Education: MI education folder, with catheterization brochure, to bedside of Anette Cannon.                                                S/p [facility] 3/21, \"Borderline Left and right sided filling pressures. Mild pulmonary hypertension , Mildly reduced cardiac output and index by Jose\" Per cath report    CP Rehab is not indicated at this time    Demarcus Roe RN

## 2024-03-22 NOTE — PROGRESS NOTES
0100 Na is 120 post 3%  Ordered 3%, 40cc/hr for 4 hrs with total volume of 160mL to be infused.   Check BMP after infusion

## 2024-03-22 NOTE — PROGRESS NOTES
Brief Procedure Note:         Indication:   CMP, PERLA    Procedure:   RHC    Complications: None   Blood loss: Minimal   Condition: Stable     Brief procedure Result:     RA 9   RV 46/6/11  PA 44/ 20/ 28   PCWP 15     PA sat 61.2%  Ao sat 92%        3.8 L/min  CI 2.1 L/min/m2       Borderline Left and right sided filling pressures   Mild pulmonary hypertension   Mildly reduced cardiac output and index by Jose     Recommendations:     Cont mx of hyponatremia per Dr Mychal Lugo per renal       Full note and recommendations to follow. '

## 2024-03-22 NOTE — PROGRESS NOTES
Nephrology Progress Note  [facility] / [place] Office  [address], [address]  [place] [address]  Phone - [phone]  Fax - [phone]                 Patient: Anette Cannon                     YOB: 1970        Date- 3/22/2024                                     Admit Date: 3/20/2024   CC: Follow up for hyponatremia          IMPRESSION & PLAN:   Hyponatremia(suspect overdiuresis, SIADH, IVVD)  PERLA stage II(suspect secondary to overdiuresis, volume depletion)  CKD stage III(baseline 1.1-1.2)  Hypovolemia  Hypokalemia  Metabolic alkalosis  Type 1 diabetes  Chronic pain syndrome      PLAN-  Right heart cath shows borderline elevated filling pressures, appreciate cardiology recommendations and support.  Reordered hypertonic saline today, goal for correction is 128 meq by tomorrow  Continue urea powder daily  Continue to check sodium every 6 hours  Creatinine much improved  I will encourage p.o. intake  Spoke to medicine team and cardiology     Subjective:  Interval History:   -Seen and examined today  -Feels much better this morning      Objective:   Vitals:    03/22/24 0310 03/22/24 0502 03/22/24 0717 03/22/24 1055   BP: (!) 100/58      Pulse: 63  68    Resp: 16  14 16   Temp: 98.1 °F (36.7 °C)      TempSrc: Oral      SpO2: 93%  93%    Weight:  78.6 kg (173 lb 4.5 oz)     Height:          I/O last 3 completed shifts:  In: 2570 [P.O.:1740; I.V.:510; IV Piggyback:320]  Out: 2700 [Urine:2700]  No intake/output data recorded.      Physical exam:    GEN: NAD  NECK- no mass  RESP: No wheezing, decreased BS b/l  CVS: S1,S2  RRR  NEURO: Normal speech, Non focal  EXT: No Edema   PSYCH: Normal Mood    Chart reviewed.         Pertinent Notes reviewed.     Data Review :  Lab Results   Component Value Date/Time     03/22/2024 06:35 AM    K 4.8 03/22/2024 06:35 AM    CL 88 03/22/2024 06:35 AM    CO2 30 03/22/2024 06:35 AM    BUN 61  03/22/2024 06:35 AM    CREATININE 1.65 03/22/2024 06:35 AM    GLUCOSE 209 03/22/2024 06:35 AM    GLUCOSE 274 03/19/2024 08:35 AM    CALCIUM 9.1 03/22/2024 06:35 AM       Lab Results   Component Value Date    WBC 5.6 03/22/2024    HGB 11.7 03/22/2024    HCT 35.1 03/22/2024    MCV 84.8 03/22/2024     03/22/2024      Recent Labs     03/21/24  0009 03/21/24  0535 03/21/24  1628 03/22/24  0106 03/22/24  0635   *   < > 118* 120* 122*   K 4.3  --   --  4.5 4.8   CL 83*  --   --  85* 88*   CO2 30  --   --  30 30   GLUCOSE 123*  --   --  208* 209*   BUN 56*  --   --  69* 61*   CREATININE 2.02*  --   --  1.83* 1.65*   CALCIUM 8.4*  --   --  8.2* 9.1    < > = values in this interval not displayed.         Recent Labs     03/20/24  1057 03/21/24  0009 03/22/24  0635   WBC 8.3 7.2 5.6   RBC 4.75 4.05 4.14   HGB 13.0 11.0* 11.7   HCT 39.6 33.8* 35.1   MCV 83.4 83.5 84.8   MCH 27.4 27.2 28.3   MCHC 32.8 32.5 33.3   RDW 20.7* 20.3* 20.3*    301 293   MPV 9.4 9.2 9.4       Lab Results   Component Value Date/Time    IRON 17 (L) 06/26/2023 05:13 PM    TIBC 345 06/26/2023 05:13 PM     No results found for: \"PTH\"  Lab Results   Component Value Date/Time    LABA1C 9.8 (H) 03/19/2024 08:35 AM     Lab Results   Component Value Date/Time    COLORU YELLOW/STRAW 03/20/2024 11:46 AM    CLARITYU CLOUDY (A) 04/25/2023 01:47 PM    GLUCOSEU Negative 03/20/2024 11:46 AM    BILIRUBINUR Negative 03/20/2024 11:46 AM    BILIRUBINUR Negative 04/25/2023 01:47 PM    KETUA Negative 03/20/2024 11:46 AM    SPECGRAV 1.007 03/20/2024 11:46 AM    BLOODU Negative 03/20/2024 11:46 AM    PHUR 5.0 04/25/2023 01:47 PM    PROTEINU Negative 03/20/2024 11:46 AM    NITRU Negative 03/20/2024 11:46 AM    LEUKOCYTESUR Negative 03/20/2024 11:46 AM     US Results (most recent):  Medication list  reviewed  Current Facility-Administered Medications   Medication Dose Route Frequency    3% sodium chloride (HYPERTONIC) IV infusion  40 mL/hr IntraVENous

## 2024-03-22 NOTE — PLAN OF CARE
Problem: Discharge Planning  Goal: Discharge to home or other facility with appropriate resources  Outcome: Progressing     Problem: Pain  Goal: Verbalizes/displays adequate comfort level or baseline comfort level  Outcome: Progressing     Problem: Safety - Adult  Goal: Free from fall injury  Outcome: Progressing     Problem: ABCDS Injury Assessment  Goal: Absence of physical injury  Outcome: Progressing     Problem: Respiratory - Adult  Goal: Achieves optimal ventilation and oxygenation  Outcome: Progressing  Flowsheets (Taken 3/21/2024 0800 by Annabel Jack RN)  Achieves optimal ventilation and oxygenation: Assess for changes in respiratory status     Problem: Neurosensory - Adult  Goal: Achieves stable or improved neurological status  Outcome: Progressing  Flowsheets (Taken 3/21/2024 0800 by Annabel Jack RN)  Achieves stable or improved neurological status:   Assess for and report changes in neurological status   Initiate measures to prevent increased intracranial pressure     Problem: Cardiovascular - Adult  Goal: Maintains optimal cardiac output and hemodynamic stability  Outcome: Progressing     Problem: Skin/Tissue Integrity - Adult  Goal: Skin integrity remains intact  Outcome: Progressing  Flowsheets (Taken 3/21/2024 0800 by Annabel Jack, RN)  Skin Integrity Remains Intact: Monitor for areas of redness and/or skin breakdown     Problem: Musculoskeletal - Adult  Goal: Return mobility to safest level of function  Outcome: Progressing  Flowsheets (Taken 3/21/2024 0800 by Annabel Jack, RN)  Return Mobility to Safest Level of Function: Assess patient stability and activity tolerance for standing, transferring and ambulating with or without assistive devices     Problem: Gastrointestinal - Adult  Goal: Minimal or absence of nausea and vomiting  Outcome: Progressing     Problem: Genitourinary - Adult  Goal: Absence of urinary retention  Outcome: Progressing     Problem: Infection - Adult  Goal:

## 2024-03-22 NOTE — CONSULTS
- 1 %    Immature Granulocytes 0 0.0 - 0.5 %    Neutrophils Absolute 5.6 1.8 - 8.0 K/UL    Lymphocytes Absolute 1.7 0.8 - 3.5 K/UL    Monocytes Absolute 0.7 0.0 - 1.0 K/UL    Eosinophils Absolute 0.2 0.0 - 0.4 K/UL    Basophils Absolute 0.1 0.0 - 0.1 K/UL    Absolute Immature Granulocyte 0.0 0.00 - 0.04 K/UL    Differential Type AUTOMATED      RBC Comment ANISOCYTOSIS  2+        RBC Comment TEARDROP CELLS  PRESENT        WBC Comment ACANTHOCYTES     Magnesium    Collection Time: 03/20/24 10:57 AM   Result Value Ref Range    Magnesium 2.3 1.6 - 2.4 mg/dL   Troponin    Collection Time: 03/20/24 10:57 AM   Result Value Ref Range    Troponin, High Sensitivity 4 0 - 51 ng/L   TSH    Collection Time: 03/20/24 10:57 AM   Result Value Ref Range    TSH, 3RD GENERATION 8.03 (H) 0.36 - 3.74 uIU/mL   Brain Natriuretic Peptide    Collection Time: 03/20/24 10:57 AM   Result Value Ref Range    NT Pro- (H) <125 PG/ML   Urinalysis with Reflex to Culture    Collection Time: 03/20/24 11:46 AM    Specimen: Urine   Result Value Ref Range    Color, UA YELLOW/STRAW      Appearance CLEAR CLEAR      Specific Gravity, UA 1.007      pH, Urine 6.5 5.0 - 8.0      Protein, UA Negative NEG mg/dL    Glucose, UA Negative NEG mg/dL    Ketones, Urine Negative NEG mg/dL    Bilirubin Urine Negative NEG      Blood, Urine Negative NEG      Urobilinogen, Urine 0.2 0.2 - 1.0 EU/dL    Nitrite, Urine Negative NEG      Leukocyte Esterase, Urine Negative NEG      Urine Culture if Indicated CULTURE NOT INDICATED BY UA RESULT      WBC, UA 0-4 0 - 4 /hpf    RBC, UA 0-5 0 - 5 /hpf    Epithelial Cells UA FEW FEW /lpf    BACTERIA, URINE Negative NEG /hpf    Hyaline Casts, UA 0-2 0 - 2 /lpf   POCT Glucose    Collection Time: 03/20/24  1:00 PM   Result Value Ref Range    POC Glucose 205 (H) 65 - 117 mg/dL    Performed by: Lamin Fallon RN    POCT Glucose    Collection Time: 03/20/24  4:49 PM   Result Value Ref Range    POC Glucose 57 (L) 65 - 117 mg/dL

## 2024-03-22 NOTE — PROGRESS NOTES
1945 Informed by primary RN Annabel of difficulty verifying orders with pharmacy from nephrologist. Discussed with pharmacy at length but pharmacy remains with uncertainty regarding length of order. This RN will discuss with on-call nephrologist to clarify orders.    2000 Paged on call nephrology BELL Grady regarding clarification of hypertonic saline orders previously given by SANDRA Bennett via TORB to Annabel Jack RN.    2010 BELL Grady clarified orders, now 3% saline for 4 hours at 40mL/hour with q4hour BMP ordered to be drawn after completion of infusion.    2015 Updated pharmacy Daniel of orders per BELL Grady. Pharmacy in agreement and comfortably with these orders. Pharmacy to resume correct orders and prepare medication for delivery.    2320 Received call from BELL Grady inquiring of update on patient condition and to confirm resolution of previous communications. Informed NP that patient is received 3% saline to be stopped at 0035 and BMP will be drawn thereafter. This RN will call NP with results of sodium recheck.    0150 Received call from BELL Grady inquiring about repeat BMP result. Infusion completed and lab still pending, will notify of results.    0230 Update BELL Grady of new resulted Na 120. Orders entered and TORB for subsequent administration of 3% saline 40mL/hour for 4 hours with recheck of sodium upon completion of infusion. Discussed with [facility] pharmacy and order modified to reflect proper end time. Pharmacy to verify and prepare infusion.

## 2024-03-23 LAB
ALBUMIN SERPL-MCNC: 3.2 G/DL (ref 3.5–5)
ANION GAP SERPL CALC-SCNC: 4 MMOL/L (ref 5–15)
ANION GAP SERPL CALC-SCNC: 4 MMOL/L (ref 5–15)
ANION GAP SERPL CALC-SCNC: 5 MMOL/L (ref 5–15)
BASOPHILS # BLD: 0 K/UL (ref 0–0.1)
BASOPHILS NFR BLD: 1 % (ref 0–1)
BUN SERPL-MCNC: 64 MG/DL (ref 6–20)
BUN SERPL-MCNC: 74 MG/DL (ref 6–20)
BUN SERPL-MCNC: 79 MG/DL (ref 6–20)
BUN/CREAT SERPL: 42 (ref 12–20)
BUN/CREAT SERPL: 50 (ref 12–20)
BUN/CREAT SERPL: 52 (ref 12–20)
CALCIUM SERPL-MCNC: 8.4 MG/DL (ref 8.5–10.1)
CALCIUM SERPL-MCNC: 8.8 MG/DL (ref 8.5–10.1)
CALCIUM SERPL-MCNC: 9.2 MG/DL (ref 8.5–10.1)
CHLORIDE SERPL-SCNC: 90 MMOL/L (ref 97–108)
CHLORIDE SERPL-SCNC: 91 MMOL/L (ref 97–108)
CHLORIDE SERPL-SCNC: 91 MMOL/L (ref 97–108)
CO2 SERPL-SCNC: 28 MMOL/L (ref 21–32)
CO2 SERPL-SCNC: 30 MMOL/L (ref 21–32)
CO2 SERPL-SCNC: 30 MMOL/L (ref 21–32)
CREAT SERPL-MCNC: 1.49 MG/DL (ref 0.55–1.02)
CREAT SERPL-MCNC: 1.51 MG/DL (ref 0.55–1.02)
CREAT SERPL-MCNC: 1.53 MG/DL (ref 0.55–1.02)
DIFFERENTIAL METHOD BLD: ABNORMAL
EOSINOPHIL # BLD: 0.2 K/UL (ref 0–0.4)
EOSINOPHIL NFR BLD: 3 % (ref 0–7)
ERYTHROCYTE [DISTWIDTH] IN BLOOD BY AUTOMATED COUNT: 20.1 % (ref 11.5–14.5)
GLUCOSE BLD STRIP.AUTO-MCNC: 116 MG/DL (ref 65–117)
GLUCOSE BLD STRIP.AUTO-MCNC: 206 MG/DL (ref 65–117)
GLUCOSE BLD STRIP.AUTO-MCNC: 342 MG/DL (ref 65–117)
GLUCOSE BLD STRIP.AUTO-MCNC: 358 MG/DL (ref 65–117)
GLUCOSE BLD STRIP.AUTO-MCNC: 94 MG/DL (ref 65–117)
GLUCOSE BLD STRIP.AUTO-MCNC: 94 MG/DL (ref 65–117)
GLUCOSE SERPL-MCNC: 116 MG/DL (ref 65–100)
GLUCOSE SERPL-MCNC: 136 MG/DL (ref 65–100)
GLUCOSE SERPL-MCNC: 148 MG/DL (ref 65–100)
HCT VFR BLD AUTO: 34.1 % (ref 35–47)
HGB BLD-MCNC: 11.3 G/DL (ref 11.5–16)
IMM GRANULOCYTES # BLD AUTO: 0 K/UL (ref 0–0.04)
IMM GRANULOCYTES NFR BLD AUTO: 0 % (ref 0–0.5)
LYMPHOCYTES # BLD: 2.8 K/UL (ref 0.8–3.5)
LYMPHOCYTES NFR BLD: 50 % (ref 12–49)
MAGNESIUM SERPL-MCNC: 1.9 MG/DL (ref 1.6–2.4)
MCH RBC QN AUTO: 28 PG (ref 26–34)
MCHC RBC AUTO-ENTMCNC: 33.1 G/DL (ref 30–36.5)
MCV RBC AUTO: 84.4 FL (ref 80–99)
MONOCYTES # BLD: 0.6 K/UL (ref 0–1)
MONOCYTES NFR BLD: 11 % (ref 5–13)
NEUTS SEG # BLD: 2.1 K/UL (ref 1.8–8)
NEUTS SEG NFR BLD: 36 % (ref 32–75)
NRBC # BLD: 0 K/UL (ref 0–0.01)
NRBC BLD-RTO: 0 PER 100 WBC
PHOSPHATE SERPL-MCNC: 4.5 MG/DL (ref 2.6–4.7)
PLATELET # BLD AUTO: 290 K/UL (ref 150–400)
PMV BLD AUTO: 9.1 FL (ref 8.9–12.9)
POTASSIUM SERPL-SCNC: 4.3 MMOL/L (ref 3.5–5.1)
POTASSIUM SERPL-SCNC: 4.4 MMOL/L (ref 3.5–5.1)
POTASSIUM SERPL-SCNC: 4.7 MMOL/L (ref 3.5–5.1)
RBC # BLD AUTO: 4.04 M/UL (ref 3.8–5.2)
SERVICE CMNT-IMP: ABNORMAL
SERVICE CMNT-IMP: NORMAL
SODIUM SERPL-SCNC: 124 MMOL/L (ref 136–145)
SODIUM SERPL-SCNC: 124 MMOL/L (ref 136–145)
SODIUM SERPL-SCNC: 125 MMOL/L (ref 136–145)
SODIUM SERPL-SCNC: 127 MMOL/L (ref 136–145)
T4 FREE SERPL-MCNC: 1.09 NG/DL (ref 0.82–1.77)
WBC # BLD AUTO: 5.7 K/UL (ref 3.6–11)

## 2024-03-23 PROCEDURE — 82962 GLUCOSE BLOOD TEST: CPT

## 2024-03-23 PROCEDURE — 83735 ASSAY OF MAGNESIUM: CPT

## 2024-03-23 PROCEDURE — 6370000000 HC RX 637 (ALT 250 FOR IP): Performed by: INTERNAL MEDICINE

## 2024-03-23 PROCEDURE — 6360000002 HC RX W HCPCS: Performed by: INTERNAL MEDICINE

## 2024-03-23 PROCEDURE — 36415 COLL VENOUS BLD VENIPUNCTURE: CPT

## 2024-03-23 PROCEDURE — 82040 ASSAY OF SERUM ALBUMIN: CPT

## 2024-03-23 PROCEDURE — 2060000000 HC ICU INTERMEDIATE R&B

## 2024-03-23 PROCEDURE — 80048 BASIC METABOLIC PNL TOTAL CA: CPT

## 2024-03-23 PROCEDURE — 94640 AIRWAY INHALATION TREATMENT: CPT

## 2024-03-23 PROCEDURE — 85025 COMPLETE CBC W/AUTO DIFF WBC: CPT

## 2024-03-23 PROCEDURE — 84100 ASSAY OF PHOSPHORUS: CPT

## 2024-03-23 PROCEDURE — 2580000003 HC RX 258: Performed by: INTERNAL MEDICINE

## 2024-03-23 PROCEDURE — 84295 ASSAY OF SERUM SODIUM: CPT

## 2024-03-23 RX ORDER — ENOXAPARIN SODIUM 100 MG/ML
40 INJECTION SUBCUTANEOUS DAILY
Status: DISCONTINUED | OUTPATIENT
Start: 2024-03-24 | End: 2024-03-27 | Stop reason: HOSPADM

## 2024-03-23 RX ORDER — INSULIN GLARGINE 100 [IU]/ML
18 INJECTION, SOLUTION SUBCUTANEOUS DAILY
Status: DISCONTINUED | OUTPATIENT
Start: 2024-03-23 | End: 2024-03-24

## 2024-03-23 RX ORDER — 3% SODIUM CHLORIDE 3 G/100ML
40 INJECTION, SOLUTION INTRAVENOUS CONTINUOUS
Status: DISCONTINUED | OUTPATIENT
Start: 2024-03-23 | End: 2024-03-23

## 2024-03-23 RX ADMIN — ALPRAZOLAM 2 MG: 0.5 TABLET ORAL at 08:51

## 2024-03-23 RX ADMIN — OXYCODONE 5 MG: 5 TABLET ORAL at 05:23

## 2024-03-23 RX ADMIN — Medication 30 G: at 08:51

## 2024-03-23 RX ADMIN — MORPHINE SULFATE 105 MG: 30 TABLET, FILM COATED, EXTENDED RELEASE ORAL at 08:50

## 2024-03-23 RX ADMIN — OXYCODONE 5 MG: 5 TABLET ORAL at 00:09

## 2024-03-23 RX ADMIN — INSULIN LISPRO 5 UNITS: 100 INJECTION, SOLUTION INTRAVENOUS; SUBCUTANEOUS at 18:40

## 2024-03-23 RX ADMIN — BUSPIRONE HYDROCHLORIDE 15 MG: 10 TABLET ORAL at 12:27

## 2024-03-23 RX ADMIN — BUSPIRONE HYDROCHLORIDE 15 MG: 10 TABLET ORAL at 21:31

## 2024-03-23 RX ADMIN — ALPRAZOLAM 2 MG: 0.5 TABLET ORAL at 21:38

## 2024-03-23 RX ADMIN — ALPRAZOLAM 2 MG: 0.5 TABLET ORAL at 15:55

## 2024-03-23 RX ADMIN — IPRATROPIUM BROMIDE 0.5 MG: 0.5 SOLUTION RESPIRATORY (INHALATION) at 08:38

## 2024-03-23 RX ADMIN — SODIUM CHLORIDE, PRESERVATIVE FREE 10 ML: 5 INJECTION INTRAVENOUS at 21:40

## 2024-03-23 RX ADMIN — ROSUVASTATIN CALCIUM 40 MG: 40 TABLET, FILM COATED ORAL at 08:50

## 2024-03-23 RX ADMIN — ASPIRIN 81 MG: 81 TABLET, COATED ORAL at 08:49

## 2024-03-23 RX ADMIN — INSULIN LISPRO 1 UNITS: 100 INJECTION, SOLUTION INTRAVENOUS; SUBCUTANEOUS at 09:14

## 2024-03-23 RX ADMIN — BUMETANIDE 2 MG: 0.25 INJECTION INTRAMUSCULAR; INTRAVENOUS at 08:52

## 2024-03-23 RX ADMIN — SODIUM CHLORIDE 40 ML/HR: 3 INJECTION, SOLUTION INTRAVENOUS at 02:39

## 2024-03-23 RX ADMIN — INSULIN GLARGINE 18 UNITS: 100 INJECTION, SOLUTION SUBCUTANEOUS at 08:52

## 2024-03-23 RX ADMIN — INSULIN LISPRO 3 UNITS: 100 INJECTION, SOLUTION INTRAVENOUS; SUBCUTANEOUS at 12:19

## 2024-03-23 RX ADMIN — OXYCODONE 5 MG: 5 TABLET ORAL at 12:24

## 2024-03-23 RX ADMIN — SODIUM CHLORIDE, PRESERVATIVE FREE 10 ML: 5 INJECTION INTRAVENOUS at 09:15

## 2024-03-23 RX ADMIN — INSULIN LISPRO 5 UNITS: 100 INJECTION, SOLUTION INTRAVENOUS; SUBCUTANEOUS at 09:14

## 2024-03-23 RX ADMIN — ENOXAPARIN SODIUM 30 MG: 100 INJECTION SUBCUTANEOUS at 08:52

## 2024-03-23 RX ADMIN — INSULIN LISPRO 5 UNITS: 100 INJECTION, SOLUTION INTRAVENOUS; SUBCUTANEOUS at 12:18

## 2024-03-23 RX ADMIN — IPRATROPIUM BROMIDE 0.5 MG: 0.5 SOLUTION RESPIRATORY (INHALATION) at 21:09

## 2024-03-23 RX ADMIN — Medication 30 G: at 21:40

## 2024-03-23 RX ADMIN — OXYCODONE 5 MG: 5 TABLET ORAL at 15:55

## 2024-03-23 RX ADMIN — MORPHINE SULFATE 105 MG: 30 TABLET, FILM COATED, EXTENDED RELEASE ORAL at 21:31

## 2024-03-23 RX ADMIN — BUSPIRONE HYDROCHLORIDE 15 MG: 10 TABLET ORAL at 08:49

## 2024-03-23 RX ADMIN — BUMETANIDE 2 MG: 0.25 INJECTION INTRAMUSCULAR; INTRAVENOUS at 18:20

## 2024-03-23 RX ADMIN — NEPHROCAP 1 MG: 1 CAP ORAL at 09:30

## 2024-03-23 ASSESSMENT — PAIN SCALES - GENERAL
PAINLEVEL_OUTOF10: 7
PAINLEVEL_OUTOF10: 4
PAINLEVEL_OUTOF10: 9
PAINLEVEL_OUTOF10: 6
PAINLEVEL_OUTOF10: 7
PAINLEVEL_OUTOF10: 5
PAINLEVEL_OUTOF10: 0
PAINLEVEL_OUTOF10: 8

## 2024-03-23 ASSESSMENT — PAIN DESCRIPTION - LOCATION
LOCATION: ABDOMEN
LOCATION: KNEE
LOCATION: KNEE
LOCATION: ABDOMEN
LOCATION: ABDOMEN

## 2024-03-23 ASSESSMENT — PAIN DESCRIPTION - DESCRIPTORS
DESCRIPTORS: ACHING

## 2024-03-23 ASSESSMENT — PAIN DESCRIPTION - ORIENTATION
ORIENTATION: ANTERIOR
ORIENTATION: RIGHT
ORIENTATION: ANTERIOR
ORIENTATION: RIGHT

## 2024-03-23 NOTE — PROGRESS NOTES
End of Shift Note    Bedside shift change report given to RN (oncoming nurse) by Kellen Mcfarland RN (offgoing nurse).  Report included the following information SBAR, MAR, Recent Results, Cardiac Rhythm NSR, and Dual Neuro Assessment    Shift worked:  7A-7:30P     Shift summary and any significant changes:     Na checks q6,      Concerns for physician to address:       Zone phone for oncoming shift:          Activity:     Number times ambulated in hallways past shift: 4  Number of times OOB to chair past shift: 10    Cardiac:   Cardiac Monitoring: Yes           Access:  Current line(s): PIV     Genitourinary:   Urinary status: voiding, BR    Respiratory:      NC 2L       GI:     Current diet:  ADULT DIET; Regular; 4 carb choices (60 gm/meal)  Passing flatus: YES  Tolerating current diet: YES       Pain Management:   Patient states pain is manageable on current regimen: YES    Skin:     Interventions: increase time out of bed    Patient Safety:  Fall Score:    Interventions: bed/chair alarm, gripper socks, pt to call before getting OOB, and stay with me (per policy)       Length of Stay:  Expected LOS: 5  Actual LOS: 3      Kellen Mcfarland RN

## 2024-03-23 NOTE — PROGRESS NOTES
Endocrinology Progress Note    Her blood sugar went from 200 at bedtime down to 94 overnight without any humalog so I'm concerned her lantus dose is a little more than she needs and decreased it to 18 units to start this morning.  Will leave her humalog doses the same per my dose last night.    Steven Schmitt MD

## 2024-03-23 NOTE — PROGRESS NOTES
Endocrinology Progress Note    Came by to see patient at 6:45pm tonight.  She underwent left heart cath this morning and is still receiving hypertonic saline to help correct her hyponatremia.      Reviewed her blood sugars since last night:     POC Glucose   Latest Ref Rng 65 - 117 mg/dL   3/21/2024  8:11     3/22/2024  2:58  (H)    3/22/2024  4:37  (H)    3/22/2024  5:24  (H)    3/22/2024  7:47  (H)       She received 7 units of humalog at 12:24pm and then another 8 units at 3:22pm and his sugar was down to 118 by 5:24pm.  When I saw her, her blood sugar was up to the 160s on her michele 3 and she was about to eat some fried chicken and mashed potatoes brought in by her boyfriend and I ordered a dose of 4 units of humalog to be given to cover this food.  Her appetite is improving so we agreed to increase her dose of humalog with food and I'll order 5 units to start tomorrow rather than the 6 units that Dr. Minor ordered just to be safe.    Assessment/Plan:  1) Type 1 diabetes with hyperglycemia: her most recent Hgb A1c was 9.8% in 3/24.  I think her current dose of lantus is reasonable but she needs to have insulin to cover her food and will benefit from a less aggressive correction scale for humalog to avoid hypoglycemia.  - cont lantus 20 units daily  - dose humalog 5 units before meals  - dose humalog 1:50 > 200 for correction during the day  - dose humalog 2 units for 201-250 + 1:50 >250 for correction at bedtime     Please don't hesitate to send me a message through perfect serve with any questions or concerns.  Dr. Ken is on call for our group this weekend.     I spent 25 minutes of face to face time on her case and > 50% of the time was spent reviewing the chart and coordinating her care with the nurse caring for her.        Steven Schmitt MD

## 2024-03-23 NOTE — PROGRESS NOTES
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  On 3% saline and nephrology is managing.  -Continue urea powder.  Check serial sodium.      Acute kidney injury  -Baseline creatinine is around 1.4.  Presented with a creatinine of 2.5.  Creatinine now trending down and is 1.5.  -Minimize nephrotoxic medications.  No IV fluids due to hyponatremia.  Check BMP in a.m. tomorrow.  -Discussed with nephrology today    Diabetes mellitus type 2  -Appreciate assistance from Dr. Schmitt.  Will defer insulin adjustments to him.  -Continue Lantus 18 units at bedtime along with insulin lispro 5 units 3 times daily and continued continue insulin sliding scale.      High TSH concerning for hypothyroidism  -TSH is elevated 8.  Pending free T4.    Depression  Chronic narcotic use  Hypertension  GERD  Dyslipidemia  -Continue PTA BuSpar, Celexa  -Continue PTA oxycodone and Xanax  -Holding home Aldactone due to acute kidney injury  -Continue PTA PPI and Crestor    Medical Decision Making:   I personally reviewed labs: CBC, BMP  I personally reviewed imaging: Chest x-ray  I personally reviewed EKG:  Toxic drug monitoring:   Discussed case with: Nephrology regarding hyponatremia        Code Status: Full code  DVT Prophylaxis: Lovenox  GI Prophylaxis:    Subjective:     Chief Complaint / Reason for Physician Visit  Does not report any shortness of breath, cough or phlegm  Blood sugars labile  Overnight events noted      Objective:     VITALS:   Last 24hrs VS reviewed since prior progress note. Most recent are:  Patient Vitals for the past 24 hrs:   BP Temp Temp src Pulse Resp SpO2   24 1434 (!) 124/98 98 °F (36.7 °C) Oral 80 16 95 %   24 1224 --

## 2024-03-23 NOTE — PROGRESS NOTES
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  -Discussed with nephrology today.  Hold off on IV fluids.  Continue urea.  Check serial sodium.  -s/p right heart cath. Getting 3 % saline today  -Check TSH level    Acute kidney injury  -Baseline creatinine is around 1.4.  Presented with a creatinine of 2.5.  Creatinine now trending down and is 2  -Minimize nephrotoxic medications.  No IV fluids due to hyponatremia.  Check BMP in a.m. tomorrow.  -Discussed with nephrology today    Diabetes mellitus type 2  -Continue Lantus 20 units at night.  Add insulin lispro 5 units 3 times daily.  Continue sliding scale insulin.  -Currently blood sugars are elevated more than 250    High TSH concerning for hypothyroidism  -TSH is elevated 8.  Check free T4.    Depression  Chronic narcotic use  Hypertension  GERD  Dyslipidemia  -Continue PTA BuSpar, Celexa  -Continue PTA oxycodone and Xanax  -Holding home Aldactone due to acute kidney injury  -Continue PTA PPI and Crestor    Medical Decision Making:   I personally reviewed labs: CBC, BMP  I personally reviewed imaging: Chest x-ray  I personally reviewed EKG:  Toxic drug monitoring:   Discussed case with: Nephrology regarding hyponatremia        Code Status: Full code  DVT Prophylaxis: Lovenox  GI Prophylaxis:    Subjective:     Chief Complaint / Reason for Physician Visit  She reports that shortness of breath is not any worse.  She claims that she gained 10 pounds.  Does not report any swelling of the legs.  No cough or phlegm  No chest pain  Overnight events noted      Objective:     VITALS:   Last 24hrs  VS reviewed since prior progress note. Most recent are:  Patient Vitals for the past 24 hrs:   BP Temp Temp src Pulse Resp SpO2   03/23/24 0850 -- -- -- -- 20 --   03/23/24 0800 111/77 97.8 °F (36.6 °C) Oral 73 15 93 %   03/23/24 0745 (!) 131/43 -- -- 69 13 (!) 75 %   03/23/24 0730 (!) 126/95 -- -- 76 16 93 %   03/22/24 2008 -- -- -- -- -- 92 %   03/22/24 1919 139/78 97.8 °F (36.6 °C) Oral 80 23 92 %   03/22/24 1530 -- -- -- -- 18 --   03/22/24 1500 -- -- -- -- 18 --   03/22/24 1215 (!) 144/77 -- -- 92 25 --   03/22/24 1200 (!) 102/47 -- -- 72 15 --   03/22/24 1145 (!) 104/56 -- -- 71 12 --   03/22/24 1130 (!) 128/58 -- -- 80 19 --   03/22/24 1115 110/73 -- -- 80 22 --   03/22/24 1055 -- -- -- -- 16 --   03/22/24 1030 (!) 162/102 -- -- 72 14 --           Intake/Output Summary (Last 24 hours) at 3/23/2024 1021  Last data filed at 3/23/2024 1020  Gross per 24 hour   Intake 1050 ml   Output 3600 ml   Net -2550 ml          I had a face to face encounter and independently examined this patient on 3/23/2024, as outlined below:  PHYSICAL EXAM:  General: Alert, cooperative  EENT:  EOMI. Anicteric sclerae.  Resp:  Bilateral air entry present, no crackles or wheezing  CV:  Regular  rhythm, no edema  GI:  Soft, Non distended, Non tender.  +Bowel sounds  Neurologic:  Alert and oriented X 3, normal speech,   Psych:   Good insight. Not anxious nor agitated  Skin:  No rashes.  No jaundice    Reviewed most current lab test results and cultures  YES  Reviewed most current radiology test results   YES  Review and summation of old records today    NO  Reviewed patient's current orders and MAR    YES  PMH/SH reviewed - no change compared to H&P    Procedures: see electronic medical records for all procedures/Xrays and details which were not copied into this note but were reviewed prior to creation of Plan.      LABS:  I reviewed today's most current labs and imaging studies.  Pertinent labs include:  Recent Labs     03/21/24  0009

## 2024-03-23 NOTE — PROGRESS NOTES
P&T-Approved DVT Prophylaxis Dosing    Per P[employer]T Committee-approved protocol enoxaparin 30mg daily has been adjusted to enoxaparin 40mg daily based on weight and renal function as shown in the table below.         Radu Atkinson RPH

## 2024-03-23 NOTE — PROGRESS NOTES
Nephrology Progress Note  [facility] [place] [facility]  [address], [address]  [place] [address]  Phone - [phone]  Fax - [phone]                 Patient: Anette Cannon                     YOB: 1970        Date- 3/23/2024                                     Admit Date: 3/20/2024   CC: Follow up for hyponatremia          IMPRESSION & PLAN:   Hyponatremia(suspect overdiuresis, SIADH, IVVD)  PERLA stage II(suspect secondary to overdiuresis, volume depletion)  CKD stage III(baseline 1.1-1.2)  Hypovolemia  Hypokalemia  Metabolic alkalosis  Type 1 diabetes  Chronic pain syndrome      PLAN-  Right heart cath shows borderline elevated filling pressures, appreciate cardiology recommendations and support.  Reordered hypertonic saline today, goal for correction is 133 meq by tomorrow\  Currently at 125   Continue urea powder daily  Continue to check sodium every 6 hours  Creatinine much improved  I will encourage p.o. intake  Spoke to medicine team and cardiology     Subjective:  Interval History:   -Seen and examined today  -Feels much better this morning  - complains of increased UO over night from late bumex   - advised she needs bumex and should get it earlier       Objective:   Vitals:    03/22/24 2008 03/23/24 0730 03/23/24 0745 03/23/24 0800   BP:  (!) 126/95 (!) 131/43 111/77   Pulse:  76 69 73   Resp:  16 13 15   Temp:       TempSrc:       SpO2: 92% 93% (!) 75% 93%   Weight:       Height:          I/O last 3 completed shifts:  In: 1260 [P.O.:940; IV Piggyback:320]  Out: 3200 [Urine:3200]  No intake/output data recorded.      Physical exam:    GEN: NAD  NECK- no mass  RESP: No wheezing, decreased BS b/l  CVS: S1,S2  RRR  NEURO: Normal speech, Non focal  EXT: No Edema   PSYCH: Normal Mood    Chart reviewed.         Pertinent Notes reviewed.     Data Review :  Lab Results   Component Value Date/Time     03/23/2024 05:30 AM

## 2024-03-23 NOTE — PLAN OF CARE
Problem: Discharge Planning  Goal: Discharge to home or other facility with appropriate resources  Outcome: Progressing     Problem: Respiratory - Adult  Goal: Achieves optimal ventilation and oxygenation  3/22/2024 2010 by Jennifer Baltazar, RT  Outcome: Progressing  Flowsheets (Taken 3/22/2024 0930 by Roseann Paiz, RN)  Achieves optimal ventilation and oxygenation:   Assess for changes in respiratory status   Assess and instruct to report shortness of breath or any respiratory difficulty

## 2024-03-23 NOTE — PROGRESS NOTES
Patient states that her DM scanner is reading BG at 86, which is low for her.  Check BG via hospital meter and got 94.  Gave patient 2 apple juice per her request    FLO DE LA VEGA RN

## 2024-03-24 LAB
ALBUMIN SERPL-MCNC: 3.1 G/DL (ref 3.5–5)
ALBUMIN SERPL-MCNC: 3.5 G/DL (ref 3.5–5)
ANION GAP SERPL CALC-SCNC: 6 MMOL/L (ref 5–15)
ANION GAP SERPL CALC-SCNC: 6 MMOL/L (ref 5–15)
BASOPHILS # BLD: 0 K/UL (ref 0–0.1)
BASOPHILS NFR BLD: 0 % (ref 0–1)
BUN SERPL-MCNC: 95 MG/DL (ref 6–20)
BUN SERPL-MCNC: 97 MG/DL (ref 6–20)
BUN/CREAT SERPL: 57 (ref 12–20)
BUN/CREAT SERPL: 66 (ref 12–20)
CALCIUM SERPL-MCNC: 8.6 MG/DL (ref 8.5–10.1)
CALCIUM SERPL-MCNC: 8.9 MG/DL (ref 8.5–10.1)
CHLORIDE SERPL-SCNC: 84 MMOL/L (ref 97–108)
CHLORIDE SERPL-SCNC: 89 MMOL/L (ref 97–108)
CO2 SERPL-SCNC: 30 MMOL/L (ref 21–32)
CO2 SERPL-SCNC: 34 MMOL/L (ref 21–32)
CREAT SERPL-MCNC: 1.48 MG/DL (ref 0.55–1.02)
CREAT SERPL-MCNC: 1.68 MG/DL (ref 0.55–1.02)
DIFFERENTIAL METHOD BLD: ABNORMAL
EOSINOPHIL # BLD: 0.2 K/UL (ref 0–0.4)
EOSINOPHIL NFR BLD: 4 % (ref 0–7)
ERYTHROCYTE [DISTWIDTH] IN BLOOD BY AUTOMATED COUNT: 20 % (ref 11.5–14.5)
GLUCOSE BLD STRIP.AUTO-MCNC: 188 MG/DL (ref 65–117)
GLUCOSE BLD STRIP.AUTO-MCNC: 298 MG/DL (ref 65–117)
GLUCOSE BLD STRIP.AUTO-MCNC: 308 MG/DL (ref 65–117)
GLUCOSE BLD STRIP.AUTO-MCNC: 317 MG/DL (ref 65–117)
GLUCOSE BLD STRIP.AUTO-MCNC: 332 MG/DL (ref 65–117)
GLUCOSE BLD STRIP.AUTO-MCNC: 348 MG/DL (ref 65–117)
GLUCOSE BLD STRIP.AUTO-MCNC: 352 MG/DL (ref 65–117)
GLUCOSE BLD STRIP.AUTO-MCNC: 401 MG/DL (ref 65–117)
GLUCOSE BLD STRIP.AUTO-MCNC: 414 MG/DL (ref 65–117)
GLUCOSE BLD STRIP.AUTO-MCNC: 455 MG/DL (ref 65–117)
GLUCOSE SERPL-MCNC: 358 MG/DL (ref 65–100)
GLUCOSE SERPL-MCNC: 451 MG/DL (ref 65–100)
HCT VFR BLD AUTO: 32.7 % (ref 35–47)
HGB BLD-MCNC: 10.7 G/DL (ref 11.5–16)
IMM GRANULOCYTES # BLD AUTO: 0 K/UL (ref 0–0.04)
IMM GRANULOCYTES NFR BLD AUTO: 0 % (ref 0–0.5)
LYMPHOCYTES # BLD: 2.5 K/UL (ref 0.8–3.5)
LYMPHOCYTES NFR BLD: 48 % (ref 12–49)
MCH RBC QN AUTO: 28 PG (ref 26–34)
MCHC RBC AUTO-ENTMCNC: 32.7 G/DL (ref 30–36.5)
MCV RBC AUTO: 85.6 FL (ref 80–99)
MONOCYTES # BLD: 0.6 K/UL (ref 0–1)
MONOCYTES NFR BLD: 11 % (ref 5–13)
NEUTS SEG # BLD: 2 K/UL (ref 1.8–8)
NEUTS SEG NFR BLD: 37 % (ref 32–75)
NRBC # BLD: 0 K/UL (ref 0–0.01)
NRBC BLD-RTO: 0 PER 100 WBC
PHOSPHATE SERPL-MCNC: 4.7 MG/DL (ref 2.6–4.7)
PHOSPHATE SERPL-MCNC: 5.5 MG/DL (ref 2.6–4.7)
PLATELET # BLD AUTO: 270 K/UL (ref 150–400)
PMV BLD AUTO: 9.2 FL (ref 8.9–12.9)
POTASSIUM SERPL-SCNC: 3.9 MMOL/L (ref 3.5–5.1)
POTASSIUM SERPL-SCNC: 4.2 MMOL/L (ref 3.5–5.1)
RBC # BLD AUTO: 3.82 M/UL (ref 3.8–5.2)
SERVICE CMNT-IMP: ABNORMAL
SODIUM SERPL-SCNC: 124 MMOL/L (ref 136–145)
SODIUM SERPL-SCNC: 124 MMOL/L (ref 136–145)
SODIUM SERPL-SCNC: 125 MMOL/L (ref 136–145)
SODIUM SERPL-SCNC: 125 MMOL/L (ref 136–145)
WBC # BLD AUTO: 5.3 K/UL (ref 3.6–11)

## 2024-03-24 PROCEDURE — 6370000000 HC RX 637 (ALT 250 FOR IP): Performed by: INTERNAL MEDICINE

## 2024-03-24 PROCEDURE — 6360000002 HC RX W HCPCS: Performed by: INTERNAL MEDICINE

## 2024-03-24 PROCEDURE — 2060000000 HC ICU INTERMEDIATE R&B

## 2024-03-24 PROCEDURE — 36415 COLL VENOUS BLD VENIPUNCTURE: CPT

## 2024-03-24 PROCEDURE — 94640 AIRWAY INHALATION TREATMENT: CPT

## 2024-03-24 PROCEDURE — 84295 ASSAY OF SERUM SODIUM: CPT

## 2024-03-24 PROCEDURE — 85025 COMPLETE CBC W/AUTO DIFF WBC: CPT

## 2024-03-24 PROCEDURE — 80069 RENAL FUNCTION PANEL: CPT

## 2024-03-24 PROCEDURE — 82962 GLUCOSE BLOOD TEST: CPT

## 2024-03-24 PROCEDURE — 84439 ASSAY OF FREE THYROXINE: CPT

## 2024-03-24 PROCEDURE — 2580000003 HC RX 258: Performed by: INTERNAL MEDICINE

## 2024-03-24 RX ORDER — INSULIN GLARGINE 100 [IU]/ML
4 INJECTION, SOLUTION SUBCUTANEOUS ONCE
Status: COMPLETED | OUTPATIENT
Start: 2024-03-24 | End: 2024-03-24

## 2024-03-24 RX ORDER — INSULIN GLARGINE 100 [IU]/ML
22 INJECTION, SOLUTION SUBCUTANEOUS DAILY
Status: DISCONTINUED | OUTPATIENT
Start: 2024-03-25 | End: 2024-03-25

## 2024-03-24 RX ADMIN — INSULIN LISPRO 5 UNITS: 100 INJECTION, SOLUTION INTRAVENOUS; SUBCUTANEOUS at 08:48

## 2024-03-24 RX ADMIN — OXYCODONE 5 MG: 5 TABLET ORAL at 04:42

## 2024-03-24 RX ADMIN — INSULIN LISPRO 5 UNITS: 100 INJECTION, SOLUTION INTRAVENOUS; SUBCUTANEOUS at 17:10

## 2024-03-24 RX ADMIN — INSULIN LISPRO 4 UNITS: 100 INJECTION, SOLUTION INTRAVENOUS; SUBCUTANEOUS at 08:48

## 2024-03-24 RX ADMIN — ALPRAZOLAM 2 MG: 0.5 TABLET ORAL at 22:15

## 2024-03-24 RX ADMIN — IPRATROPIUM BROMIDE 0.5 MG: 0.5 SOLUTION RESPIRATORY (INHALATION) at 07:33

## 2024-03-24 RX ADMIN — INSULIN GLARGINE 4 UNITS: 100 INJECTION, SOLUTION SUBCUTANEOUS at 13:09

## 2024-03-24 RX ADMIN — ALPRAZOLAM 2 MG: 0.5 TABLET ORAL at 15:21

## 2024-03-24 RX ADMIN — INSULIN LISPRO 3 UNITS: 100 INJECTION, SOLUTION INTRAVENOUS; SUBCUTANEOUS at 17:11

## 2024-03-24 RX ADMIN — IPRATROPIUM BROMIDE 0.5 MG: 0.5 SOLUTION RESPIRATORY (INHALATION) at 20:08

## 2024-03-24 RX ADMIN — ALPRAZOLAM 2 MG: 0.5 TABLET ORAL at 08:54

## 2024-03-24 RX ADMIN — ASPIRIN 81 MG: 81 TABLET, COATED ORAL at 08:54

## 2024-03-24 RX ADMIN — BUMETANIDE 2 MG: 0.25 INJECTION INTRAMUSCULAR; INTRAVENOUS at 08:54

## 2024-03-24 RX ADMIN — OXYCODONE 5 MG: 5 TABLET ORAL at 08:54

## 2024-03-24 RX ADMIN — INSULIN LISPRO 5 UNITS: 100 INJECTION, SOLUTION INTRAVENOUS; SUBCUTANEOUS at 11:16

## 2024-03-24 RX ADMIN — MORPHINE SULFATE 105 MG: 30 TABLET, FILM COATED, EXTENDED RELEASE ORAL at 08:52

## 2024-03-24 RX ADMIN — INSULIN GLARGINE 18 UNITS: 100 INJECTION, SOLUTION SUBCUTANEOUS at 08:48

## 2024-03-24 RX ADMIN — NEPHROCAP 1 MG: 1 CAP ORAL at 08:54

## 2024-03-24 RX ADMIN — ENOXAPARIN SODIUM 40 MG: 100 INJECTION SUBCUTANEOUS at 08:49

## 2024-03-24 RX ADMIN — OXYCODONE 5 MG: 5 TABLET ORAL at 13:08

## 2024-03-24 RX ADMIN — SODIUM CHLORIDE, PRESERVATIVE FREE 10 ML: 5 INJECTION INTRAVENOUS at 08:55

## 2024-03-24 RX ADMIN — BUSPIRONE HYDROCHLORIDE 15 MG: 10 TABLET ORAL at 08:52

## 2024-03-24 RX ADMIN — BUSPIRONE HYDROCHLORIDE 15 MG: 10 TABLET ORAL at 13:08

## 2024-03-24 RX ADMIN — OXYCODONE 5 MG: 5 TABLET ORAL at 18:00

## 2024-03-24 RX ADMIN — BUSPIRONE HYDROCHLORIDE 15 MG: 10 TABLET ORAL at 22:15

## 2024-03-24 RX ADMIN — INSULIN LISPRO 4 UNITS: 100 INJECTION, SOLUTION INTRAVENOUS; SUBCUTANEOUS at 11:15

## 2024-03-24 RX ADMIN — Medication 30 G: at 08:55

## 2024-03-24 RX ADMIN — SODIUM CHLORIDE, PRESERVATIVE FREE 10 ML: 5 INJECTION INTRAVENOUS at 22:21

## 2024-03-24 RX ADMIN — INSULIN LISPRO 3 UNITS: 100 INJECTION, SOLUTION INTRAVENOUS; SUBCUTANEOUS at 22:17

## 2024-03-24 RX ADMIN — MORPHINE SULFATE 105 MG: 30 TABLET, FILM COATED, EXTENDED RELEASE ORAL at 22:15

## 2024-03-24 RX ADMIN — ROSUVASTATIN CALCIUM 40 MG: 40 TABLET, FILM COATED ORAL at 08:52

## 2024-03-24 ASSESSMENT — PAIN SCALES - GENERAL
PAINLEVEL_OUTOF10: 8
PAINLEVEL_OUTOF10: 7
PAINLEVEL_OUTOF10: 7
PAINLEVEL_OUTOF10: 8
PAINLEVEL_OUTOF10: 4
PAINLEVEL_OUTOF10: 4
PAINLEVEL_OUTOF10: 6

## 2024-03-24 ASSESSMENT — PAIN DESCRIPTION - LOCATION
LOCATION: ABDOMEN
LOCATION: KNEE
LOCATION: ABDOMEN
LOCATION: ABDOMEN

## 2024-03-24 ASSESSMENT — PAIN DESCRIPTION - ORIENTATION
ORIENTATION: LOWER
ORIENTATION: RIGHT
ORIENTATION: ANTERIOR
ORIENTATION: ANTERIOR

## 2024-03-24 ASSESSMENT — PAIN SCALES - WONG BAKER: WONGBAKER_NUMERICALRESPONSE: HURTS LITTLE MORE

## 2024-03-24 ASSESSMENT — PAIN DESCRIPTION - DESCRIPTORS
DESCRIPTORS: ACHING

## 2024-03-24 NOTE — PROGRESS NOTES
Endocrinology progress note    Her blood sugar was up to 455 by lunch today. Called and spoke with her. She did eat Chinese last night and this morning has been in more pain. I had decreased her lantus yesterday to avoid overnight hypoglycemia but clearly needs more basal insulin at this time. She was just given 9 units of humalog and I ordered a one time dose of 4 units of lantus and increased her dose to 22 units to start tomorrow  and kept her humalog the same for now.     Steven Schmitt MD

## 2024-03-24 NOTE — PROGRESS NOTES
End of Shift Note    Bedside shift change report given to RN (oncoming nurse) by Kellen Mcfarland RN (offgoing nurse).  Report included the following information SBAR, Intake/Output, MAR, Recent Results, and Cardiac Rhythm NSR    Shift worked:  7a-8p     Shift summary and any significant changes:     Pt on fluid restriction 1200ml, non-compliant with the restriction despite being educated by the nephrologist and the nurse, interprets it at 1200 of H2O and unlimited diet soda and other drinks.   Asks for PRN Oxicodone Q4H and Xanax Q6H for abdominal pain d/t endometriosis and anxiety.  BG before breakfast 352 and at lunch 455. Endocrinologist contacted and orders received (see MAR)  Na+ at 124 at 17:43   Concerns for physician to address:       Zone phone for oncoming shift:          Activity:     Number times ambulated in hallways past shift: 10  Number of times OOB to chair past shift: continuously           Kellen Mcfarland RN

## 2024-03-24 NOTE — PROGRESS NOTES
BED ALARM REFUSAL    The patient refused the bed alarm.  Education regarding measures to prevent fall and risk for serious injury related to fall were provided to the patient  by Kylah SANTIAGO RN,.  The  patient verbalized understanding.  Informed refusal form was signed by the patient (See signed informed refusal form in chart).

## 2024-03-24 NOTE — PLAN OF CARE
Problem: Metabolic/Fluid and Electrolytes - Adult  Goal: Electrolytes maintained within normal limits  3/24/2024 1628 by Kellen Mcfarland RN  Outcome: Not Progressing      Problem: Discharge Planning  Goal: Discharge to home or other facility with appropriate resources  3/24/2024 1628 by Kellen Mcfarland RN  Outcome: Progressing       Problem: Pain  Goal: Verbalizes/displays adequate comfort level or baseline comfort level  3/24/2024 1628 by Kellen Mcfarland RN  Outcome: Progressing     Problem: Safety - Adult  Goal: Free from fall injury  3/24/2024 1628 by Kellen Mcfarland RN  Outcome: Progressing       Problem: ABCDS Injury Assessment  Goal: Absence of physical injury  3/24/2024 1628 by Kellen Mcfarland RN  Outcome: Progressing       Problem: Respiratory - Adult  Goal: Achieves optimal ventilation and oxygenation  3/24/2024 1628 by Kellen Mcfarland RN  Outcome: Progressing    Problem: Cardiovascular - Adult  Goal: Maintains optimal cardiac output and hemodynamic stability  3/24/2024 1628 by Kellen Mcfarland RN  Outcome: Progressing  Problem: Genitourinary - Adult    Problem: Hematologic - Adult  Goal: Maintains hematologic stability  3/24/2024 1628 by Kellen Mcfarland RN  Outcome: Progressing    Problem: Skin/Tissue Integrity - Adult  Goal: Skin integrity remains intact  3/24/2024 1628 by Kellen Mcfarland RN  Outcome: Adequate for Discharge    Problem: Musculoskeletal - Adult  Goal: Return mobility to safest level of function  3/24/2024 1628 by Kellen Mcfarland RN  Outcome: Adequate for Discharge    Problem: Chronic Conditions and Co-morbidities  Goal: Patient's chronic conditions and co-morbidity symptoms are monitored and maintained or improved  3/24/2024 1628 by Kellen Mcfarland RN  Outcome: Progressing

## 2024-03-24 NOTE — PROGRESS NOTES
Hospitalist Progress Note    NAME:   Anette Cannon   : 1970   MRN: [id number]     Date/Time: 3/24/2024 4:53 PM  Patient PCP: Jair Vieira MD    Estimated discharge date: 3/25  Barriers: Improvement of sodium, nephrology clearance, blood sugar control      Assessment / Plan:    Hyponatremia likely due to overdiuresis, SIADH  -Sodium currently is 125.  Nephrology is managing.  S/p 3% saline.  Nephrology is discontinuing urea powder.  -Continue fluid restriction.  -Check serial sodium  -Appreciate recommendations from nephrology      Acute kidney injury  -Baseline creatinine is around 1.4.  Presented with a creatinine of 2.5.  Creatinine now trending down and is 1.5.  -Minimize nephrotoxic medications.  No IV fluids due to hyponatremia.  Check BMP in a.m. tomorrow.  -    Diabetes mellitus type 2 uncontrolled  -Appreciate assistance from Dr. Schmitt.  Will defer insulin adjustments to him.  -Blood sugars this morning were high and Dr. Schmitt adjusted insulin dosages  -Lantus increased to 22 units daily.  Continue insulin lispro 5 units 3 times daily.  Continue insulin sliding scale.  -She was emphasized about the importance of low-carb diet        High TSH concerning for hypothyroidism  -TSH is elevated 8.  T4 is normal.  Likely subclinical hypothyroidism.  Will need repeat thyroid function testing in 6 weeks and if TSH is trending up, she may need levothyroxine    Depression  Chronic narcotic use  Hypertension  GERD  Dyslipidemia  -Continue PTA BuSpar, Celexa  -Continue PTA oxycodone and Xanax  -Holding home Aldactone due to acute kidney injury  -Continue PTA PPI and Crestor    Medical Decision Making:   I personally reviewed labs: CBC, BMP  I personally reviewed imaging:  I personally reviewed EKG:  Toxic drug monitoring:   Discussed case with: Nephrology regarding hyponatremia, pharmacy,         Code Status: Full code  DVT Prophylaxis: Lovenox  GI Prophylaxis:    Subjective:     Chief  Complaint / Reason for Physician Visit  She reports eating a Chinese meal last night.  She is currently alert awake oriented x 3  Does not report any confusion      Objective:     VITALS:   Last 24hrs VS reviewed since prior progress note. Most recent are:  Patient Vitals for the past 24 hrs:   BP Temp Temp src Pulse Resp SpO2   03/24/24 1439 (!) 118/56 98.1 °F (36.7 °C) Oral 78 13 (!) 89 %   03/24/24 1032 128/70 98 °F (36.7 °C) Oral 73 13 91 %   03/24/24 0852 -- -- -- -- 16 --   03/24/24 0733 -- -- -- -- -- 93 %   03/24/24 0233 (!) 102/56 97.4 °F (36.3 °C) Oral 63 18 99 %   03/23/24 2355 106/64 97.8 °F (36.6 °C) Oral 62 18 91 %   03/23/24 2110 -- -- -- 73 16 91 %   03/23/24 1945 114/78 98.6 °F (37 °C) Oral 82 20 --           Intake/Output Summary (Last 24 hours) at 3/24/2024 1653  Last data filed at 3/24/2024 1439  Gross per 24 hour   Intake 2200 ml   Output 5900 ml   Net -3700 ml          I had a face to face encounter and independently examined this patient on 3/24/2024, as outlined below:  PHYSICAL EXAM:  General: Alert, cooperative  EENT:  EOMI. Anicteric sclerae.  Resp:  Bilateral air entry present, no crackles or wheezing  CV:  Regular  rhythm, no edema  GI:  Soft, Non distended, Non tender.  +Bowel sounds  Neurologic:  Alert and oriented X 3, normal speech,   Psych:   Good insight. Not anxious nor agitated  Skin:  No rashes.  No jaundice    Reviewed most current lab test results and cultures  YES  Reviewed most current radiology test results   YES  Review and summation of old records today    NO  Reviewed patient's current orders and MAR    YES  PMH/SH reviewed - no change compared to H&P    Procedures: see electronic medical records for all procedures/Xrays and details which were not copied into this note but were reviewed prior to creation of Plan.      LABS:  I reviewed today's most current labs and imaging studies.  Pertinent labs include:  Recent Labs     03/22/24  0635 03/23/24  0112 03/24/24  0045

## 2024-03-25 LAB
ALBUMIN SERPL-MCNC: 3.1 G/DL (ref 3.5–5)
ANION GAP SERPL CALC-SCNC: 6 MMOL/L (ref 5–15)
BASOPHILS # BLD: 0 K/UL (ref 0–0.1)
BASOPHILS NFR BLD: 1 % (ref 0–1)
BUN SERPL-MCNC: 74 MG/DL (ref 6–20)
BUN/CREAT SERPL: 56 (ref 12–20)
CALCIUM SERPL-MCNC: 8.6 MG/DL (ref 8.5–10.1)
CHLORIDE SERPL-SCNC: 89 MMOL/L (ref 97–108)
CO2 SERPL-SCNC: 31 MMOL/L (ref 21–32)
CREAT SERPL-MCNC: 1.32 MG/DL (ref 0.55–1.02)
DIFFERENTIAL METHOD BLD: ABNORMAL
EOSINOPHIL # BLD: 0.2 K/UL (ref 0–0.4)
EOSINOPHIL NFR BLD: 5 % (ref 0–7)
ERYTHROCYTE [DISTWIDTH] IN BLOOD BY AUTOMATED COUNT: 19.9 % (ref 11.5–14.5)
GLUCOSE BLD STRIP.AUTO-MCNC: 166 MG/DL (ref 65–117)
GLUCOSE BLD STRIP.AUTO-MCNC: 194 MG/DL (ref 65–117)
GLUCOSE BLD STRIP.AUTO-MCNC: 212 MG/DL (ref 65–117)
GLUCOSE BLD STRIP.AUTO-MCNC: 291 MG/DL (ref 65–117)
GLUCOSE BLD STRIP.AUTO-MCNC: 291 MG/DL (ref 65–117)
GLUCOSE BLD STRIP.AUTO-MCNC: 363 MG/DL (ref 65–117)
GLUCOSE BLD STRIP.AUTO-MCNC: 416 MG/DL (ref 65–117)
GLUCOSE BLD STRIP.AUTO-MCNC: 72 MG/DL (ref 65–117)
GLUCOSE SERPL-MCNC: 376 MG/DL (ref 65–100)
HCT VFR BLD AUTO: 31.3 % (ref 35–47)
HGB BLD-MCNC: 10 G/DL (ref 11.5–16)
IMM GRANULOCYTES # BLD AUTO: 0 K/UL (ref 0–0.04)
IMM GRANULOCYTES NFR BLD AUTO: 0 % (ref 0–0.5)
LYMPHOCYTES # BLD: 2.2 K/UL (ref 0.8–3.5)
LYMPHOCYTES NFR BLD: 42 % (ref 12–49)
MCH RBC QN AUTO: 27.7 PG (ref 26–34)
MCHC RBC AUTO-ENTMCNC: 31.9 G/DL (ref 30–36.5)
MCV RBC AUTO: 86.7 FL (ref 80–99)
MONOCYTES # BLD: 0.6 K/UL (ref 0–1)
MONOCYTES NFR BLD: 11 % (ref 5–13)
NEUTS SEG # BLD: 2.2 K/UL (ref 1.8–8)
NEUTS SEG NFR BLD: 41 % (ref 32–75)
NRBC # BLD: 0 K/UL (ref 0–0.01)
NRBC BLD-RTO: 0 PER 100 WBC
PHOSPHATE SERPL-MCNC: 4 MG/DL (ref 2.6–4.7)
PLATELET # BLD AUTO: 289 K/UL (ref 150–400)
PMV BLD AUTO: 9.7 FL (ref 8.9–12.9)
POTASSIUM SERPL-SCNC: 3.8 MMOL/L (ref 3.5–5.1)
RBC # BLD AUTO: 3.61 M/UL (ref 3.8–5.2)
SERVICE CMNT-IMP: ABNORMAL
SERVICE CMNT-IMP: NORMAL
SODIUM SERPL-SCNC: 126 MMOL/L (ref 136–145)
SODIUM SERPL-SCNC: 128 MMOL/L (ref 136–145)
SODIUM SERPL-SCNC: 130 MMOL/L (ref 136–145)
WBC # BLD AUTO: 5.3 K/UL (ref 3.6–11)

## 2024-03-25 PROCEDURE — 99231 SBSQ HOSP IP/OBS SF/LOW 25: CPT | Performed by: INTERNAL MEDICINE

## 2024-03-25 PROCEDURE — 85025 COMPLETE CBC W/AUTO DIFF WBC: CPT

## 2024-03-25 PROCEDURE — 2580000003 HC RX 258: Performed by: INTERNAL MEDICINE

## 2024-03-25 PROCEDURE — 6360000002 HC RX W HCPCS: Performed by: INTERNAL MEDICINE

## 2024-03-25 PROCEDURE — 80069 RENAL FUNCTION PANEL: CPT

## 2024-03-25 PROCEDURE — 6370000000 HC RX 637 (ALT 250 FOR IP): Performed by: INTERNAL MEDICINE

## 2024-03-25 PROCEDURE — 2060000000 HC ICU INTERMEDIATE R&B

## 2024-03-25 PROCEDURE — 36415 COLL VENOUS BLD VENIPUNCTURE: CPT

## 2024-03-25 PROCEDURE — 84295 ASSAY OF SERUM SODIUM: CPT

## 2024-03-25 PROCEDURE — 82962 GLUCOSE BLOOD TEST: CPT

## 2024-03-25 PROCEDURE — 94640 AIRWAY INHALATION TREATMENT: CPT

## 2024-03-25 RX ORDER — INSULIN GLARGINE 100 [IU]/ML
24 INJECTION, SOLUTION SUBCUTANEOUS DAILY
Status: DISCONTINUED | OUTPATIENT
Start: 2024-03-25 | End: 2024-03-27 | Stop reason: HOSPADM

## 2024-03-25 RX ORDER — TOLVAPTAN 15 MG/1
15 TABLET ORAL ONCE
Status: DISCONTINUED | OUTPATIENT
Start: 2024-03-25 | End: 2024-03-25

## 2024-03-25 RX ORDER — INSULIN LISPRO 100 [IU]/ML
6 INJECTION, SOLUTION INTRAVENOUS; SUBCUTANEOUS
Status: DISCONTINUED | OUTPATIENT
Start: 2024-03-25 | End: 2024-03-27 | Stop reason: HOSPADM

## 2024-03-25 RX ORDER — INSULIN LISPRO 100 [IU]/ML
0-8 INJECTION, SOLUTION INTRAVENOUS; SUBCUTANEOUS
Status: DISCONTINUED | OUTPATIENT
Start: 2024-03-25 | End: 2024-03-27 | Stop reason: HOSPADM

## 2024-03-25 RX ORDER — BUMETANIDE 0.25 MG/ML
1 INJECTION INTRAMUSCULAR; INTRAVENOUS ONCE
Status: COMPLETED | OUTPATIENT
Start: 2024-03-25 | End: 2024-03-25

## 2024-03-25 RX ADMIN — MORPHINE SULFATE 105 MG: 30 TABLET, FILM COATED, EXTENDED RELEASE ORAL at 21:48

## 2024-03-25 RX ADMIN — ACETAMINOPHEN 650 MG: 325 TABLET ORAL at 08:16

## 2024-03-25 RX ADMIN — NEPHROCAP 1 MG: 1 CAP ORAL at 08:16

## 2024-03-25 RX ADMIN — ALPRAZOLAM 2 MG: 0.5 TABLET ORAL at 08:16

## 2024-03-25 RX ADMIN — ACETAMINOPHEN 650 MG: 325 TABLET ORAL at 16:49

## 2024-03-25 RX ADMIN — SODIUM CHLORIDE, PRESERVATIVE FREE 10 ML: 5 INJECTION INTRAVENOUS at 21:50

## 2024-03-25 RX ADMIN — BUSPIRONE HYDROCHLORIDE 15 MG: 10 TABLET ORAL at 14:04

## 2024-03-25 RX ADMIN — IPRATROPIUM BROMIDE 0.5 MG: 0.5 SOLUTION RESPIRATORY (INHALATION) at 21:36

## 2024-03-25 RX ADMIN — INSULIN GLARGINE 24 UNITS: 100 INJECTION, SOLUTION SUBCUTANEOUS at 08:15

## 2024-03-25 RX ADMIN — INSULIN LISPRO 5 UNITS: 100 INJECTION, SOLUTION INTRAVENOUS; SUBCUTANEOUS at 07:20

## 2024-03-25 RX ADMIN — BUMETANIDE 1 MG: 0.25 INJECTION INTRAMUSCULAR; INTRAVENOUS at 10:29

## 2024-03-25 RX ADMIN — ALPRAZOLAM 2 MG: 0.5 TABLET ORAL at 14:04

## 2024-03-25 RX ADMIN — INSULIN LISPRO 6 UNITS: 100 INJECTION, SOLUTION INTRAVENOUS; SUBCUTANEOUS at 07:21

## 2024-03-25 RX ADMIN — OXYCODONE 5 MG: 5 TABLET ORAL at 11:30

## 2024-03-25 RX ADMIN — IPRATROPIUM BROMIDE 0.5 MG: 0.5 SOLUTION RESPIRATORY (INHALATION) at 08:47

## 2024-03-25 RX ADMIN — ROSUVASTATIN CALCIUM 40 MG: 40 TABLET, FILM COATED ORAL at 08:16

## 2024-03-25 RX ADMIN — INSULIN LISPRO 6 UNITS: 100 INJECTION, SOLUTION INTRAVENOUS; SUBCUTANEOUS at 11:32

## 2024-03-25 RX ADMIN — MORPHINE SULFATE 105 MG: 30 TABLET, FILM COATED, EXTENDED RELEASE ORAL at 08:15

## 2024-03-25 RX ADMIN — INSULIN LISPRO 1 UNITS: 100 INJECTION, SOLUTION INTRAVENOUS; SUBCUTANEOUS at 11:35

## 2024-03-25 RX ADMIN — OXYCODONE 5 MG: 5 TABLET ORAL at 16:50

## 2024-03-25 RX ADMIN — OXYCODONE 5 MG: 5 TABLET ORAL at 04:57

## 2024-03-25 RX ADMIN — BUSPIRONE HYDROCHLORIDE 15 MG: 10 TABLET ORAL at 08:16

## 2024-03-25 RX ADMIN — BUSPIRONE HYDROCHLORIDE 15 MG: 10 TABLET ORAL at 21:48

## 2024-03-25 RX ADMIN — ENOXAPARIN SODIUM 40 MG: 100 INJECTION SUBCUTANEOUS at 08:15

## 2024-03-25 RX ADMIN — INSULIN LISPRO 6 UNITS: 100 INJECTION, SOLUTION INTRAVENOUS; SUBCUTANEOUS at 16:48

## 2024-03-25 RX ADMIN — ASPIRIN 81 MG: 81 TABLET, COATED ORAL at 08:16

## 2024-03-25 RX ADMIN — ALPRAZOLAM 2 MG: 0.5 TABLET ORAL at 21:48

## 2024-03-25 RX ADMIN — SODIUM CHLORIDE, PRESERVATIVE FREE 10 ML: 5 INJECTION INTRAVENOUS at 08:26

## 2024-03-25 ASSESSMENT — PAIN SCALES - GENERAL
PAINLEVEL_OUTOF10: 8
PAINLEVEL_OUTOF10: 8
PAINLEVEL_OUTOF10: 4
PAINLEVEL_OUTOF10: 5
PAINLEVEL_OUTOF10: 8
PAINLEVEL_OUTOF10: 6
PAINLEVEL_OUTOF10: 7
PAINLEVEL_OUTOF10: 8
PAINLEVEL_OUTOF10: 5

## 2024-03-25 ASSESSMENT — PAIN DESCRIPTION - LOCATION
LOCATION: OTHER (COMMENT)
LOCATION: OTHER (COMMENT)
LOCATION: KNEE
LOCATION: KNEE

## 2024-03-25 ASSESSMENT — PAIN DESCRIPTION - ORIENTATION
ORIENTATION: RIGHT
ORIENTATION: RIGHT
ORIENTATION: RIGHT;LEFT

## 2024-03-25 ASSESSMENT — PAIN DESCRIPTION - DESCRIPTORS
DESCRIPTORS: ACHING

## 2024-03-25 NOTE — PROGRESS NOTES
Hospitalist Progress Note    NAME:   Anette Cannon   : 1970   MRN: [id number]     Date/Time: 3/25/2024 2:27 PM  Patient PCP: Jair Vieira MD    Estimated discharge date: 3/27  Barriers: Improvement of sodium, blood sugar      Assessment / Plan:    Hyponatremia likely due to overdiuresis, SIADH  -Sodium currently is improving and is 128.  S/p 3% saline.  -Greatly appreciate assistance from nephrology  -Continue fluid restriction.  Will give Bumex 1 mg IV x 1.  No more urea powder.  -Discussed with nephrology today.  -I spent 10 minutes discussing with her about importance of fluid restriction and how noncompliance she has been.      Acute kidney injury  -Baseline creatinine is around 1.4.  Presented with a creatinine of 2.5.  Creatinine now trending down and is 1.3.  -Minimize nephrotoxic medications.  No IV fluids due to hyponatremia.  Check BMP in a.m. tomorrow.      Diabetes mellitus type 2 uncontrolled  -Appreciate assistance from Dr. Schmitt.  Will defer insulin adjustments to him.  -Blood sugars this morning were high and Dr. Schmitt adjusted insulin dosages  -Lantus increased to 24 units daily.  Insulin lispro increased to 6 units 3 times daily.  Continue sliding scale insulin.  -She was emphasized about the importance of low-carb diet      High TSH concerning for hypothyroidism  -TSH is elevated 8.  T4 is normal.  Likely subclinical hypothyroidism.  Will need repeat thyroid function testing in 6 weeks and if TSH is trending up, she may need levothyroxine    Depression  Chronic narcotic use  Hypertension  GERD  Dyslipidemia  -Continue PTA BuSpar, Celexa  -Continue PTA oxycodone and Xanax  -Holding home Aldactone due to acute kidney injury  -Continue PTA PPI and Crestor    Medical Decision Making:   I personally reviewed labs: CBC, BMP  I personally reviewed imaging:  I personally reviewed EKG:  Toxic drug monitoring:   Discussed case with: Nephrology regarding hyponatremia, pharmacy, case  NO  Reviewed patient's current orders and MAR    YES  PMH/SH reviewed - no change compared to H&P    Procedures: see electronic medical records for all procedures/Xrays and details which were not copied into this note but were reviewed prior to creation of Plan.      LABS:  I reviewed today's most current labs and imaging studies.  Pertinent labs include:  Recent Labs     03/23/24  0112 03/24/24  0045 03/25/24  0500   WBC 5.7 5.3 5.3   HGB 11.3* 10.7* 10.0*   HCT 34.1* 32.7* 31.3*    270 289       Recent Labs     03/23/24  0112 03/23/24  0530 03/24/24  0045 03/24/24  0621 03/24/24  1743 03/25/24  0500 03/25/24  1243   *   < > 125*   < > 124* 126* 128*   K 4.3   < > 3.9  --  4.2 3.8  --    CL 90*   < > 89*  --  84* 89*  --    CO2 30   < > 30  --  34* 31  --    GLUCOSE 148*   < > 358*  --  451* 376*  --    BUN 74*   < > 97*  --  95* 74*  --    CREATININE 1.49*   < > 1.48*  --  1.68* 1.32*  --    CALCIUM 8.4*   < > 8.6  --  8.9 8.6  --    MG 1.9  --   --   --   --   --   --    PHOS 4.5  --  4.7  --  5.5* 4.0  --    LABALBU 3.2*  --  3.1*  --  3.5 3.1*  --     < > = values in this interval not displayed.         Signed: Rolando Minor MD

## 2024-03-25 NOTE — PLAN OF CARE
Problem: Discharge Planning  Goal: Discharge to home or other facility with appropriate resources  3/24/2024 2107 by Lita Moran RN  Problem: Pain  Goal: Verbalizes/displays adequate comfort level or baseline comfort level  3/24/2024 2107 by Lita Moran RN  Outcome: Progressing  Flowsheets (Taken 3/24/2024 1930)  Verbalizes/displays adequate comfort level or baseline comfort level:   Encourage patient to monitor pain and request assistance   Assess pain using appropriate pain scale  3/24/2024 1628 by Kellen Mcfarland RN  Problem: Musculoskeletal - Adult  Goal: Return mobility to safest level of function  3/24/2024 2107 by Lita Moran RN  Outcome: Progressing  Flowsheets (Taken 3/24/2024 2000)  Return Mobility to Safest Level of Function: Assess patient stability and activity tolerance for standing, transferring and ambulating with or without assistive devices     Problem: Metabolic/Fluid and Electrolytes - Adult  Goal: Electrolytes maintained within normal limits  3/24/2024 2107 by Lita Moran RN  Outcome: Progressing  Flowsheets (Taken 3/24/2024 2000)  Electrolytes maintained within normal limits: Monitor labs and assess patient for signs and symptoms of electrolyte imbalances  Problem: Chronic Conditions and Co-morbidities  Goal: Patient's chronic conditions and co-morbidity symptoms are monitored and maintained or improved  3/24/2024 2107 by Lita Moran RN  Outcome: Progressing  Flowsheets (Taken 3/24/2024 2000)  Care Plan - Patient's Chronic Conditions and Co-Morbidity Symptoms are Monitored and Maintained or Improved:   Monitor and assess patient's chronic conditions and comorbid symptoms for stability, deterioration, or improvement   Collaborate with multidisciplinary team to address chronic and comorbid conditions and prevent exacerbation or deterioration    Problem: Metabolic/Fluid and Electrolytes - Adult  Goal: Electrolytes maintained within  normal limits  3/24/2024 2107 by Lita Moran, RN  Outcome: Progressing  Flowsheets (Taken 3/24/2024 2000)  Electrolytes maintained within normal limits: Monitor labs and assess patient for signs and symptoms of electrolyte imbalances

## 2024-03-25 NOTE — PROGRESS NOTES
Endocrinology Progress Note    Reviewed blood sugars from yesterday and overnight:     POC Glucose   Latest Ref Rng 65 - 117 mg/dL   3/24/2024  7:20  (H)    3/24/2024  7:21  (H)    3/24/2024  11:00  (H)    3/24/2024  11:01  (H)    3/24/2024  11:02  (H)    3/24/2024  2:15  (H)    3/24/2024  4:01  (H)     291 (H)    3/24/2024  4:02  (H)    3/24/2024  7:46  (H)    3/25/2024  6:52  (H)       She received the additional 4 units of lantus yesterday and received her normal doses of humalog per my orders and sugar did come down from 455 to 188 in the afternoon but then was back up to around 300 by dinner and bedtime and was 416 this morning.  She states she did not drink anything with sugar overnight but did have some sugar free jolly ranchers.  She was in pain overnight that may have increased her sugar.      Assessment/Plan:  1) Type 1 diabetes with hyperglycemia: her most recent Hgb A1c was 9.8% in 3/24.  Since her sugars are remaining over 200, I increased her lantus again today and made her humalog more aggressive with food.  - increase lantus to 24 units daily  - dose humalog 6 units before meals  - dose humalog 1:50 > 200 for correction during the day  - dose humalog 2 units for 201-250 + 1:50 >250 for correction at bedtime     Please don't hesitate to send me a message through perfect serve with any questions or concerns.       I spent 25 minutes of face to face time on her case and > 50% of the time was spent reviewing the chart and coordinating her care with the nurse caring for her.        Steven Schmitt MD

## 2024-03-25 NOTE — PROGRESS NOTES
Progress Note      3/25/2024 7:52 AM  NAME: Anette Cannon   MRN:  [id number]   Admit Diagnosis: Hyponatremia [E87.1]       Primary Cardiologist: Dr. La  Physician Requesting consult: Dr. Dukes       Problem List:      Hyponatremia, renal following-possible overdiuresis, SIADH  Renal insufficiency     Problem list  Chronic diastolic heart failure.  Echo 6/23 w/ EF 65-70%.  HTN  DM  XOL  Chronic back pain  Venous insufficiency s/p venous ablation of bilat GSV and right anterior accessory vein  PFO s/p closure  COPD  Tobacco abuse  CKD, Stg II  Hyponatremia with metolazone/SSRI in the past       RHC: RA 9   RV 46/6/11  PA 44/ 20/ 28   PCWP 15   PA sat 61.2%  Ao sat 92%   3.8 L/min  CI 2.1 L/min/m2          Assessment/Plan:          Na improving slowly   Free water restriction   Bumex per renal   Some degree of pedal edema is contributed by venous insufficiency   Continue aspirin  Continue Crestor     Will see as needed, call if any questions                         [x]       High complexity decision making was performed in this patient at high risk for decompensation with multiple organ involvement.       Subjective:     HPI:   No CP or sob  Wants to go home soon     ROS: No CP, SOB, Abd pain, nausea, vomiting, syncope, palpitations, new focal neurological symptoms     Objective:      Physical Exam:    Last 24hrs VS reviewed since prior progress note. Most recent are:    /76   Pulse 71   Temp 98.2 °F (36.8 °C) (Oral)   Resp 16   Ht 1.651 m (5' 5\")   Wt 73 kg (161 lb)   SpO2 95%   BMI 26.79 kg/m²     Intake/Output Summary (Last 24 hours) at 3/25/2024 0752  Last data filed at 3/25/2024 0652  Gross per 24 hour   Intake 2550 ml   Output 4600 ml   Net -2050 ml         General: Alert and oriented x3, no acute distress   Neck: Supple   Respiratory: No respiratory distress, clear lung sound   Cardiovascular: Regular rate rhythm, S1S2, no murmur   Abdomen: soft, non tender, non distended   Neuro:

## 2024-03-25 NOTE — CARE COORDINATION
Transition of Care Plan:    RUR: 31%  Prior Level of Functioning: independant  Disposition: home  If SNF or IPR: Date FOC offered:   Date FOC received:   Accepting facility:   Date authorization started with reference number:   Date authorization received and expires:   Follow up appointments: PCP and Specialist  DME needed: Has Home 02  Transportation at discharge: family  IM/IMM Medicare/ letter given: sign at DC  Is patient a  and connected with [facility]?    If yes, was [place] transfer form completed and [facility] notified?   Caregiver Contact:  [phone]   Discharge Caregiver contacted prior to discharge?   Care Conference needed?   Barriers to discharge: clinical improvement  CM to follow up on DC planning. She has home 02 . Possible DC Wednesday.    English [place] DELVALLE CM   0054

## 2024-03-26 ENCOUNTER — TELEPHONE (OUTPATIENT)
Age: 54
End: 2024-03-26

## 2024-03-26 LAB
ALBUMIN SERPL-MCNC: 3 G/DL (ref 3.5–5)
ANION GAP SERPL CALC-SCNC: 5 MMOL/L (ref 5–15)
BASOPHILS # BLD: 0 K/UL (ref 0–0.1)
BASOPHILS NFR BLD: 1 % (ref 0–1)
BUN SERPL-MCNC: 48 MG/DL (ref 6–20)
BUN/CREAT SERPL: 36 (ref 12–20)
CALCIUM SERPL-MCNC: 8.5 MG/DL (ref 8.5–10.1)
CHLORIDE SERPL-SCNC: 93 MMOL/L (ref 97–108)
CO2 SERPL-SCNC: 30 MMOL/L (ref 21–32)
CREAT SERPL-MCNC: 1.32 MG/DL (ref 0.55–1.02)
DIFFERENTIAL METHOD BLD: ABNORMAL
EOSINOPHIL # BLD: 0.2 K/UL (ref 0–0.4)
EOSINOPHIL NFR BLD: 4 % (ref 0–7)
ERYTHROCYTE [DISTWIDTH] IN BLOOD BY AUTOMATED COUNT: 19.9 % (ref 11.5–14.5)
GLUCOSE BLD STRIP.AUTO-MCNC: 134 MG/DL (ref 65–117)
GLUCOSE BLD STRIP.AUTO-MCNC: 246 MG/DL (ref 65–117)
GLUCOSE BLD STRIP.AUTO-MCNC: 292 MG/DL (ref 65–117)
GLUCOSE BLD STRIP.AUTO-MCNC: 294 MG/DL (ref 65–117)
GLUCOSE BLD STRIP.AUTO-MCNC: 380 MG/DL (ref 65–117)
GLUCOSE BLD STRIP.AUTO-MCNC: 55 MG/DL (ref 65–117)
GLUCOSE BLD STRIP.AUTO-MCNC: 71 MG/DL (ref 65–117)
GLUCOSE BLD STRIP.AUTO-MCNC: 75 MG/DL (ref 65–117)
GLUCOSE BLD STRIP.AUTO-MCNC: 78 MG/DL (ref 65–117)
GLUCOSE BLD STRIP.AUTO-MCNC: 80 MG/DL (ref 65–117)
GLUCOSE SERPL-MCNC: 350 MG/DL (ref 65–100)
HCT VFR BLD AUTO: 33.8 % (ref 35–47)
HGB BLD-MCNC: 10.9 G/DL (ref 11.5–16)
IMM GRANULOCYTES # BLD AUTO: 0 K/UL (ref 0–0.04)
IMM GRANULOCYTES NFR BLD AUTO: 0 % (ref 0–0.5)
LYMPHOCYTES # BLD: 2.6 K/UL (ref 0.8–3.5)
LYMPHOCYTES NFR BLD: 47 % (ref 12–49)
MCH RBC QN AUTO: 28.5 PG (ref 26–34)
MCHC RBC AUTO-ENTMCNC: 32.2 G/DL (ref 30–36.5)
MCV RBC AUTO: 88.3 FL (ref 80–99)
MONOCYTES # BLD: 0.6 K/UL (ref 0–1)
MONOCYTES NFR BLD: 10 % (ref 5–13)
NEUTS SEG # BLD: 2.1 K/UL (ref 1.8–8)
NEUTS SEG NFR BLD: 38 % (ref 32–75)
NRBC # BLD: 0 K/UL (ref 0–0.01)
NRBC BLD-RTO: 0 PER 100 WBC
PHOSPHATE SERPL-MCNC: 3.5 MG/DL (ref 2.6–4.7)
PLATELET # BLD AUTO: 286 K/UL (ref 150–400)
PMV BLD AUTO: 9.5 FL (ref 8.9–12.9)
POTASSIUM SERPL-SCNC: 3.8 MMOL/L (ref 3.5–5.1)
RBC # BLD AUTO: 3.83 M/UL (ref 3.8–5.2)
SERVICE CMNT-IMP: ABNORMAL
SERVICE CMNT-IMP: NORMAL
SODIUM SERPL-SCNC: 128 MMOL/L (ref 136–145)
T4 FREE SERPL-MCNC: 0.99 NG/DL (ref 0.82–1.77)
WBC # BLD AUTO: 5.5 K/UL (ref 3.6–11)

## 2024-03-26 PROCEDURE — 6360000002 HC RX W HCPCS: Performed by: INTERNAL MEDICINE

## 2024-03-26 PROCEDURE — 82962 GLUCOSE BLOOD TEST: CPT

## 2024-03-26 PROCEDURE — 2580000003 HC RX 258: Performed by: INTERNAL MEDICINE

## 2024-03-26 PROCEDURE — 6370000000 HC RX 637 (ALT 250 FOR IP): Performed by: INTERNAL MEDICINE

## 2024-03-26 PROCEDURE — 85025 COMPLETE CBC W/AUTO DIFF WBC: CPT

## 2024-03-26 PROCEDURE — 94640 AIRWAY INHALATION TREATMENT: CPT

## 2024-03-26 PROCEDURE — 36415 COLL VENOUS BLD VENIPUNCTURE: CPT

## 2024-03-26 PROCEDURE — 80069 RENAL FUNCTION PANEL: CPT

## 2024-03-26 PROCEDURE — 2060000000 HC ICU INTERMEDIATE R&B

## 2024-03-26 PROCEDURE — 99231 SBSQ HOSP IP/OBS SF/LOW 25: CPT | Performed by: INTERNAL MEDICINE

## 2024-03-26 RX ORDER — INSULIN GLARGINE 100 [IU]/ML
4 INJECTION, SOLUTION SUBCUTANEOUS
Status: DISCONTINUED | OUTPATIENT
Start: 2024-03-26 | End: 2024-03-27 | Stop reason: HOSPADM

## 2024-03-26 RX ORDER — BUMETANIDE 0.25 MG/ML
0.5 INJECTION INTRAMUSCULAR; INTRAVENOUS ONCE
Status: COMPLETED | OUTPATIENT
Start: 2024-03-26 | End: 2024-03-26

## 2024-03-26 RX ADMIN — SODIUM CHLORIDE, PRESERVATIVE FREE 10 ML: 5 INJECTION INTRAVENOUS at 21:45

## 2024-03-26 RX ADMIN — NEPHROCAP 1 MG: 1 CAP ORAL at 08:43

## 2024-03-26 RX ADMIN — INSULIN LISPRO 2 UNITS: 100 INJECTION, SOLUTION INTRAVENOUS; SUBCUTANEOUS at 11:56

## 2024-03-26 RX ADMIN — BUSPIRONE HYDROCHLORIDE 15 MG: 10 TABLET ORAL at 13:29

## 2024-03-26 RX ADMIN — ALPRAZOLAM 2 MG: 0.5 TABLET ORAL at 15:46

## 2024-03-26 RX ADMIN — ENOXAPARIN SODIUM 40 MG: 100 INJECTION SUBCUTANEOUS at 08:44

## 2024-03-26 RX ADMIN — BUSPIRONE HYDROCHLORIDE 15 MG: 10 TABLET ORAL at 21:42

## 2024-03-26 RX ADMIN — OXYCODONE 5 MG: 5 TABLET ORAL at 12:00

## 2024-03-26 RX ADMIN — MORPHINE SULFATE 105 MG: 30 TABLET, FILM COATED, EXTENDED RELEASE ORAL at 21:41

## 2024-03-26 RX ADMIN — BUMETANIDE 0.5 MG: 0.25 INJECTION INTRAMUSCULAR; INTRAVENOUS at 11:58

## 2024-03-26 RX ADMIN — SODIUM CHLORIDE, PRESERVATIVE FREE 10 ML: 5 INJECTION INTRAVENOUS at 08:47

## 2024-03-26 RX ADMIN — INSULIN LISPRO 2 UNITS: 100 INJECTION, SOLUTION INTRAVENOUS; SUBCUTANEOUS at 21:59

## 2024-03-26 RX ADMIN — OXYCODONE 5 MG: 5 TABLET ORAL at 04:35

## 2024-03-26 RX ADMIN — OXYCODONE 5 MG: 5 TABLET ORAL at 17:49

## 2024-03-26 RX ADMIN — ASPIRIN 81 MG: 81 TABLET, COATED ORAL at 08:46

## 2024-03-26 RX ADMIN — INSULIN GLARGINE 24 UNITS: 100 INJECTION, SOLUTION SUBCUTANEOUS at 08:45

## 2024-03-26 RX ADMIN — BUSPIRONE HYDROCHLORIDE 15 MG: 10 TABLET ORAL at 08:46

## 2024-03-26 RX ADMIN — INSULIN LISPRO 6 UNITS: 100 INJECTION, SOLUTION INTRAVENOUS; SUBCUTANEOUS at 11:57

## 2024-03-26 RX ADMIN — DEXTROSE MONOHYDRATE 125 ML: 100 INJECTION, SOLUTION INTRAVENOUS at 14:02

## 2024-03-26 RX ADMIN — ROSUVASTATIN CALCIUM 40 MG: 40 TABLET, FILM COATED ORAL at 08:46

## 2024-03-26 RX ADMIN — CITALOPRAM HYDROBROMIDE 40 MG: 20 TABLET ORAL at 09:39

## 2024-03-26 RX ADMIN — INSULIN LISPRO 6 UNITS: 100 INJECTION, SOLUTION INTRAVENOUS; SUBCUTANEOUS at 07:14

## 2024-03-26 RX ADMIN — IPRATROPIUM BROMIDE 0.5 MG: 0.5 SOLUTION RESPIRATORY (INHALATION) at 20:33

## 2024-03-26 RX ADMIN — MORPHINE SULFATE 105 MG: 30 TABLET, FILM COATED, EXTENDED RELEASE ORAL at 08:43

## 2024-03-26 RX ADMIN — ALPRAZOLAM 2 MG: 0.5 TABLET ORAL at 21:42

## 2024-03-26 RX ADMIN — INSULIN LISPRO 4 UNITS: 100 INJECTION, SOLUTION INTRAVENOUS; SUBCUTANEOUS at 07:15

## 2024-03-26 RX ADMIN — INSULIN LISPRO 6 UNITS: 100 INJECTION, SOLUTION INTRAVENOUS; SUBCUTANEOUS at 16:49

## 2024-03-26 RX ADMIN — ALPRAZOLAM 2 MG: 0.5 TABLET ORAL at 08:44

## 2024-03-26 RX ADMIN — BREXPIPRAZOLE 2 MG: 1 TABLET ORAL at 11:58

## 2024-03-26 ASSESSMENT — PAIN SCALES - GENERAL
PAINLEVEL_OUTOF10: 0
PAINLEVEL_OUTOF10: 8
PAINLEVEL_OUTOF10: 0
PAINLEVEL_OUTOF10: 8
PAINLEVEL_OUTOF10: 8
PAINLEVEL_OUTOF10: 0
PAINLEVEL_OUTOF10: 7
PAINLEVEL_OUTOF10: 7
PAINLEVEL_OUTOF10: 0

## 2024-03-26 ASSESSMENT — PAIN DESCRIPTION - DESCRIPTORS
DESCRIPTORS: CRAMPING
DESCRIPTORS: ACHING
DESCRIPTORS: ACHING
DESCRIPTORS: CRAMPING

## 2024-03-26 ASSESSMENT — PAIN DESCRIPTION - LOCATION
LOCATION: ABDOMEN
LOCATION: OTHER (COMMENT)
LOCATION: KNEE
LOCATION: KNEE
LOCATION: ABDOMEN
LOCATION: ABDOMEN

## 2024-03-26 ASSESSMENT — PAIN SCALES - WONG BAKER
WONGBAKER_NUMERICALRESPONSE: NO HURT

## 2024-03-26 ASSESSMENT — PAIN - FUNCTIONAL ASSESSMENT
PAIN_FUNCTIONAL_ASSESSMENT: ACTIVITIES ARE NOT PREVENTED

## 2024-03-26 ASSESSMENT — PAIN DESCRIPTION - ORIENTATION
ORIENTATION: RIGHT
ORIENTATION: RIGHT

## 2024-03-26 NOTE — PROGRESS NOTES
Endocrinology Progress Note    Reviewed blood sugars from yesterday.     POC Glucose   Latest Ref Rng 65 - 117 mg/dL   3/25/2024  6:52  (H)    3/25/2024  11:08  (H)    3/25/2024  4:01  (H)    3/25/2024  8:09 PM 73    3/25/2024  9:39  (H)    3/26/2024  6:59  (H)       Came by to see her this morning and she states she had 2 apple juices last night when her sugar was 72 and this brought her up to 194 this morning.  She did not have anything else to eat or drink last night to cause her sugar to go to 380 this morning but was in pain last night and didn't sleep well and this may have caused her sugar to go higher overnight and we agreed to try a prn dose of lantus 4 units at bedtime if she is in more pain before bedtime to see if this helps keep her blood sugars down but otherwise will keep her doses the same.  Reached out to Dr. Mnior over perfect serve to see if she was stable for discharge but he will be keeping her at least one more day due to her low sodium.    Assessment/Plan:  1) Type 1 diabetes with hyperglycemia: her most recent Hgb A1c was 9.8% in 3/24.  I will keep her daytime doses the same and add a prn dose of lantus 4 units at bedtime if she is in more pain at bedtime.  - cont lantus 24 units daily (4 units at bedtime prn if in more pain)  - cont humalog 6 units before meals  - dose humalog 1:50 > 200 for correction during the day  - dose humalog 2 units for 201-250 + 1:50 >250 for correction at bedtime     Please don't hesitate to send me a message through perfect serve with any questions or concerns.       I spent 25 minutes of face to face time on her case and > 50% of the time was spent reviewing the chart and coordinating her care with Dr. Minor and the nurse caring for her.        Steven Schmitt MD

## 2024-03-26 NOTE — ED PROVIDER NOTES
[facility] CARDIOPULMONARY CARE  EMERGENCY DEPARTMENT ENCOUNTER       Pt Name: Anette aCnnon  MRN: [id number]  Birthdate 1970  Date of evaluation: 3/20/2024  Provider: Marcos Stinson MD   PCP: Jair Vieira MD  Note Started: 8:44 PM EDT 3/25/24     CHIEF COMPLAINT       Chief Complaint   Patient presents with    Chest Pain    Shortness of Breath        HISTORY OF PRESENT ILLNESS: 1 or more elements      History From: Patient and EMS  HPI Limitations: Mental Health Disorder     Anette Cannon is a 54 y.o. female who presents for hyponatremia elevated blood work that was drawn yesterday.  She has numerous comorbid conditions listed below.  She denies change to her medications recently.  She reports body aches, generalized fatigue and weakness.  She states she has had hyponatremia previously.  She denies vomiting, diarrhea, shortness of breath, chest pain, fever, chills.  No change to her diuretics or other medications recently.  She reports feeling anxious, tremulous and would like Xanax as she has not taken it yet.  Presents via EMS.         Nursing Notes were all reviewed and agreed with or any disagreements were addressed in the HPI.     REVIEW OF SYSTEMS      Review of Systems     Positives and Pertinent negatives as per HPI.    PAST HISTORY     Past Medical History:  Past Medical History:   Diagnosis Date    Achilles tendon rupture     along with torn right patellar/femoral ligament    Arthritis     rt. foot    Chronic kidney disease     Chronic pain     abdominal pain    Diabetes (HCC)     IDDM on insulin pump and sensor    DM type 1 (diabetes mellitus, type 1) ([facility])     age 22    Endometriosis     s/p ex-lap 4x    Gastric ulcer     GERD (gastroesophageal reflux disease)     Herpes     Other and unspecified hyperlipidemia     Panic attacks     Psychiatric disorder     anxiety, panic attacks    PTSD (post-traumatic stress disorder)     Unspecified essential hypertension          Past Surgical

## 2024-03-26 NOTE — PROGRESS NOTES
Hospitalist Progress Note    NAME:   Anette Cannon   : 1970   MRN: [id number]     Date/Time: 3/26/2024 12:59 PM  Patient PCP: Jair Vieira MD    Estimated discharge date: 3/27  Barriers: Improvement of sodium, blood sugar control      Assessment / Plan:    Hyponatremia likely due to overdiuresis, SIADH  -Sodium currently is improving and is 130.  S/p 3% saline.  -Greatly appreciate assistance from nephrology  -Continue strict 1200 ml fluid restriction.  Will get another 0.5 mg of Bumex IV x 1 today.  No more urea powder.  -If sodium is stable and above 130 tomorrow, she will be able to be released to      Acute kidney injury  -Baseline creatinine is around 1.4.  Presented with a creatinine of 2.5.  Creatinine now trending down and is 1.3.  -Minimize nephrotoxic medications.  No IV fluids due to hyponatremia.  Check BMP in a.m. tomorrow.      Diabetes mellitus type 2 uncontrolled  -Appreciate assistance from Dr. Schmitt.  Will defer insulin adjustments to him.  -Blood sugars this morning were high and Dr. Schmitt adjusted insulin dosages  -Lantus increased to 24 units daily.  Insulin lispro increased to 6 units 3 times daily.  Continue sliding scale insulin.  -She was emphasized about the importance of low-carb diet      High TSH concerning for hypothyroidism  -TSH is elevated 8.  T4 is normal.  Likely subclinical hypothyroidism.  Will need repeat thyroid function testing in 6 weeks and if TSH is trending up, she may need levothyroxine    Depression  Chronic narcotic use  Hypertension  GERD  Dyslipidemia  -Continue PTA BuSpar, Celexa  -Continue PTA oxycodone and Xanax  -Holding home Aldactone due to acute kidney injury  -Continue PTA PPI and Crestor    Medical Decision Making:   I personally reviewed labs: CBC, BMP  I personally reviewed imaging:  I personally reviewed EKG:  Toxic drug monitoring:   Discussed case with: Nephrology regarding hyponatremia, pharmacy,         Code Status:  YES  Review and summation of old records today    NO  Reviewed patient's current orders and MAR    YES  PMH/SH reviewed - no change compared to H&P    Procedures: see electronic medical records for all procedures/Xrays and details which were not copied into this note but were reviewed prior to creation of Plan.      LABS:  I reviewed today's most current labs and imaging studies.  Pertinent labs include:  Recent Labs     03/24/24  0045 03/25/24  0500 03/26/24  0437   WBC 5.3 5.3 5.5   HGB 10.7* 10.0* 10.9*   HCT 32.7* 31.3* 33.8*    289 286       Recent Labs     03/24/24  1743 03/25/24  0500 03/25/24  1243 03/25/24  1851 03/26/24  0437   * 126* 128* 130* 128*   K 4.2 3.8  --   --  3.8   CL 84* 89*  --   --  93*   CO2 34* 31  --   --  30   GLUCOSE 451* 376*  --   --  350*   BUN 95* 74*  --   --  48*   CREATININE 1.68* 1.32*  --   --  1.32*   CALCIUM 8.9 8.6  --   --  8.5   PHOS 5.5* 4.0  --   --  3.5   LABALBU 3.5 3.1*  --   --  3.0*         Signed: Rolando Minor MD

## 2024-03-26 NOTE — TELEPHONE ENCOUNTER
Pt LVM stating she would like to speak to nurse regarding her blood glucose.  Pt states her blood glucose was 80 and she is on a fluid restriction. She says her sugar dropped down to 70 and she became anxious. Pt states she was able to get a cheese sandwich but they will not give her juice. She states she should be discharged tomorrow. She just wanted to let Dr Schmitt know.

## 2024-03-26 NOTE — PLAN OF CARE
Problem: Discharge Planning  Goal: Discharge to home or other facility with appropriate resources  3/25/2024 2039 by Lita Moran RN  Outcome: Progressing  Flowsheets (Taken 3/25/2024 2000)  Discharge to home or other facility with appropriate resources: Arrange for needed discharge resources and transportation as appropriate  Problem: Pain  Goal: Verbalizes/displays adequate comfort level or baseline comfort level  3/25/2024 2039 by Lita Moran RN  Outcome: Progressing  Flowsheets (Taken 3/25/2024 2000)  Verbalizes/displays adequate comfort level or baseline comfort level:   Encourage patient to monitor pain and request assistance   Assess pain using appropriate pain scale  3  Problem: Safety - Adult  Goal: Free from fall injury  3/25/2024 2039 by Lita Moran RN  Outcome: Progressing    Problem: Chronic Conditions and Co-morbidities  Goal: Patient's chronic conditions and co-morbidity symptoms are monitored and maintained or improved  3/25/2024 2039 by Lita Moran RN  Outcome: Progressing  Flowsheets (Taken 3/25/2024 2000)  Care Plan - Patient's Chronic Conditions and Co-Morbidity Symptoms are Monitored and Maintained or Improved:   Monitor and assess patient's chronic conditions and comorbid symptoms for stability, deterioration, or improvement   Collaborate with multidisciplinary team to address chronic and comorbid conditions and prevent exacerbation or deterioration     Problem: Metabolic/Fluid and Electrolytes - Adult  Goal: Electrolytes maintained within normal limits  3/25/2024 2039 by Lita Moran RN  Outcome: Progressing  Flowsheets (Taken 3/25/2024 2000)  Electrolytes maintained within normal limits:   Monitor labs and assess patient for signs and symptoms of electrolyte imbalances   Administer electrolyte replacement as ordered     Problem: Neurosensory - Adult  Goal: Achieves stable or improved neurological status  3/25/2024 2039 by

## 2024-03-26 NOTE — PROGRESS NOTES
Nephrology Progress Note  [facility] / [place] Office  [address], [address]  [place] [address]  Phone - [phone]  Fax - [phone]                 Patient: Anette Cannon                     YOB: 1970        Date- 3/26/2024                                     Admit Date: 3/20/2024   CC: Follow up for hyponatremia    IMPRESSION & PLAN:   hyponatremia  due to non compliance to fluid restriction + chf   PERLA stage II(suspect secondary to overdiuresis, volume depletion)  CKD stage III(baseline 1.1-1.2)  Hypovolemia  Hypokalemia  Metabolic alkalosis  Type 1 diabetes  Chronic pain syndrome      PLAN-  Give bumex 0.5 mg iv now  No more urea powder   No SAMSCA needed  She needs to follow fluid restriction 1200 ml/day  Check bmp this pm  D/w patient-- we can't let her go home today. Hopefully na will improve and she can go home tomorrow     Subjective:  Interval History:   She remained non compliant to fluid restriction. She was walking in lobby to get ice.  Na stable- low 128  Cr stable  Urine out put 2200 ml    Objective:   Vitals:    03/26/24 0453 03/26/24 0454 03/26/24 0755 03/26/24 1032   BP: (!) 114/59  117/63 137/65   Pulse: 60 60 63 70   Resp: 18  16 16   Temp: 98.1 °F (36.7 °C)  97.8 °F (36.6 °C) 98.3 °F (36.8 °C)   TempSrc:   Oral Oral   SpO2:       Weight:       Height:          I/O last 3 completed shifts:  In: 1850 [P.O.:1850]  Out: 2200 [Urine:2200]  I/O this shift:  In: 400 [P.O.:400]  Out: -       Physical exam:    GEN:  NAD  NECK:  Supple, no thyromegaly  RESP:  no  wheezing,   NEURO: non focal, normal speech  EXT: Edema +nt         Chart reviewed.         Pertinent Notes reviewed.     Data Review :  Lab Results   Component Value Date/Time     03/26/2024 04:37 AM    K 3.8 03/26/2024 04:37 AM    CL 93 03/26/2024 04:37 AM    CO2 30 03/26/2024 04:37 AM    BUN 48 03/26/2024 04:37 AM    CREATININE 1.32 03/26/2024  04:37 AM    GLUCOSE 350 03/26/2024 04:37 AM    GLUCOSE 274 03/19/2024 08:35 AM    CALCIUM 8.5 03/26/2024 04:37 AM       Lab Results   Component Value Date    WBC 5.5 03/26/2024    HGB 10.9 (L) 03/26/2024    HCT 33.8 (L) 03/26/2024    MCV 88.3 03/26/2024     03/26/2024      Recent Labs     03/24/24  1743 03/25/24  0500 03/25/24  1243 03/25/24  1851 03/26/24  0437   * 126* 128* 130* 128*   K 4.2 3.8  --   --  3.8   CL 84* 89*  --   --  93*   CO2 34* 31  --   --  30   GLUCOSE 451* 376*  --   --  350*   BUN 95* 74*  --   --  48*   CREATININE 1.68* 1.32*  --   --  1.32*   CALCIUM 8.9 8.6  --   --  8.5         Recent Labs     03/24/24  0045 03/25/24  0500 03/26/24  0437   WBC 5.3 5.3 5.5   RBC 3.82 3.61* 3.83   HGB 10.7* 10.0* 10.9*   HCT 32.7* 31.3* 33.8*   MCV 85.6 86.7 88.3   MCH 28.0 27.7 28.5   MCHC 32.7 31.9 32.2   RDW 20.0* 19.9* 19.9*    289 286   MPV 9.2 9.7 9.5       Lab Results   Component Value Date/Time    IRON 17 (L) 06/26/2023 05:13 PM    TIBC 345 06/26/2023 05:13 PM     No results found for: \"PTH\"  Lab Results   Component Value Date/Time    LABA1C 9.8 (H) 03/19/2024 08:35 AM     Lab Results   Component Value Date/Time    COLORU YELLOW/STRAW 03/20/2024 11:46 AM    CLARITYU CLOUDY (A) 04/25/2023 01:47 PM    GLUCOSEU Negative 03/20/2024 11:46 AM    BILIRUBINUR Negative 03/20/2024 11:46 AM    BILIRUBINUR Negative 04/25/2023 01:47 PM    KETUA Negative 03/20/2024 11:46 AM    SPECGRAV 1.007 03/20/2024 11:46 AM    BLOODU Negative 03/20/2024 11:46 AM    PHUR 5.0 04/25/2023 01:47 PM    PROTEINU Negative 03/20/2024 11:46 AM    NITRU Negative 03/20/2024 11:46 AM    LEUKOCYTESUR Negative 03/20/2024 11:46 AM     US Results (most recent):  Medication list  reviewed  Current Facility-Administered Medications   Medication Dose Route Frequency    insulin glargine (LANTUS) injection vial 4 Units  4 Units SubCUTAneous QHS PRN    insulin lispro (HUMALOG) injection vial 6 Units  6 Units SubCUTAneous TID AC

## 2024-03-26 NOTE — PLAN OF CARE
Initiate measures to prevent increased intracranial pressure     Problem: Cardiovascular - Adult  Goal: Maintains optimal cardiac output and hemodynamic stability  Recent Flowsheet Documentation  Taken 3/26/2024 0755 by Lu Kingston RN  Maintains optimal cardiac output and hemodynamic stability:   Monitor blood pressure and heart rate   Monitor urine output and notify Licensed Independent Practitioner for values outside of normal range  3/25/2024 2039 by Lita Moran RN  Outcome: Progressing  Flowsheets (Taken 3/25/2024 2000)  Maintains optimal cardiac output and hemodynamic stability: Monitor blood pressure and heart rate     Problem: Skin/Tissue Integrity - Adult  Goal: Skin integrity remains intact  Recent Flowsheet Documentation  Taken 3/26/2024 0755 by Lu Kingston RN  Skin Integrity Remains Intact: Monitor for areas of redness and/or skin breakdown  3/25/2024 2039 by Lita Moran RN  Outcome: Progressing  Flowsheets (Taken 3/25/2024 2000)  Skin Integrity Remains Intact:   Monitor for areas of redness and/or skin breakdown   Assess vascular access sites hourly     Problem: Musculoskeletal - Adult  Goal: Return mobility to safest level of function  Recent Flowsheet Documentation  Taken 3/26/2024 0755 by Lu Kingston RN  Return Mobility to Safest Level of Function: Assist with transfers and ambulation using safe patient handling equipment as needed  3/25/2024 2039 by Lita Moran RN  Outcome: Progressing     Problem: Gastrointestinal - Adult  Goal: Minimal or absence of nausea and vomiting  Recent Flowsheet Documentation  Taken 3/26/2024 0755 by Lu Kingston RN  Minimal or absence of nausea and vomiting: Administer IV fluids as ordered to ensure adequate hydration  3/25/2024 2039 by Lita Moran RN  Outcome: Progressing     Problem: Genitourinary - Adult  Goal: Absence of urinary retention  Recent Flowsheet Documentation  Taken

## 2024-03-26 NOTE — PROGRESS NOTES
End of Shift Note    Bedside shift change report given to Lita DELVALLE  (oncoming nurse) by PELON INGRAM, ADELIA (offgoing nurse).  Report included the following information SBAR    Shift worked:  7am-7pm     Shift summary and any significant changes:     No significant change noted . patient FSBS was 54 low D10 IV given recheck with in 15 min FSBS 107. Cont. Given PRN pain med thought  the shift .     Concerns for physician to address:  None      Zone phone for oncoming shift:          Activity:     Number times ambulated in hallways past shift: 0  Number of times OOB to chair past shift: 1    Cardiac:   Cardiac Monitoring: Yes           Access:  Current line(s): PIV     Genitourinary:   Urinary status: voiding    Respiratory:      Chronic home O2 use?: NO  Incentive spirometer at bedside: NO       GI:     Current diet:  ADULT DIET; Regular; 4 carb choices (60 gm/meal); 1200 ml  Passing flatus: YES  Tolerating current diet: YES       Pain Management:   Patient states pain is manageable on current regimen: YES    Skin:     Interventions: increase time out of bed    Patient Safety:  Fall Score:    Interventions: gripper socks       Length of Stay:  Expected LOS: 7  Actual LOS: 6      PELON INGRAM, RN

## 2024-03-27 ENCOUNTER — TELEPHONE (OUTPATIENT)
Age: 54
End: 2024-03-27

## 2024-03-27 VITALS
SYSTOLIC BLOOD PRESSURE: 123 MMHG | OXYGEN SATURATION: 98 % | HEART RATE: 79 BPM | HEIGHT: 65 IN | RESPIRATION RATE: 18 BRPM | DIASTOLIC BLOOD PRESSURE: 62 MMHG | BODY MASS INDEX: 29.57 KG/M2 | WEIGHT: 177.47 LBS | TEMPERATURE: 97.8 F

## 2024-03-27 LAB
ALBUMIN SERPL-MCNC: 3.3 G/DL (ref 3.5–5)
ANION GAP SERPL CALC-SCNC: 7 MMOL/L (ref 5–15)
BASOPHILS # BLD: 0 K/UL (ref 0–0.1)
BASOPHILS NFR BLD: 0 % (ref 0–1)
BUN SERPL-MCNC: 33 MG/DL (ref 6–20)
BUN/CREAT SERPL: 28 (ref 12–20)
CALCIUM SERPL-MCNC: 8.6 MG/DL (ref 8.5–10.1)
CHLORIDE SERPL-SCNC: 95 MMOL/L (ref 97–108)
CO2 SERPL-SCNC: 29 MMOL/L (ref 21–32)
CREAT SERPL-MCNC: 1.2 MG/DL (ref 0.55–1.02)
DIFFERENTIAL METHOD BLD: ABNORMAL
EOSINOPHIL # BLD: 0.2 K/UL (ref 0–0.4)
EOSINOPHIL NFR BLD: 4 % (ref 0–7)
ERYTHROCYTE [DISTWIDTH] IN BLOOD BY AUTOMATED COUNT: 20.1 % (ref 11.5–14.5)
GLUCOSE BLD STRIP.AUTO-MCNC: 108 MG/DL (ref 65–117)
GLUCOSE BLD STRIP.AUTO-MCNC: 162 MG/DL (ref 65–117)
GLUCOSE BLD STRIP.AUTO-MCNC: 71 MG/DL (ref 65–117)
GLUCOSE BLD STRIP.AUTO-MCNC: 79 MG/DL (ref 65–117)
GLUCOSE BLD STRIP.AUTO-MCNC: NORMAL MG/DL (ref 65–117)
GLUCOSE SERPL-MCNC: 224 MG/DL (ref 65–100)
HCT VFR BLD AUTO: 34.5 % (ref 35–47)
HGB BLD-MCNC: 11.1 G/DL (ref 11.5–16)
IMM GRANULOCYTES # BLD AUTO: 0 K/UL (ref 0–0.04)
IMM GRANULOCYTES NFR BLD AUTO: 0 % (ref 0–0.5)
LYMPHOCYTES # BLD: 2.2 K/UL (ref 0.8–3.5)
LYMPHOCYTES NFR BLD: 41 % (ref 12–49)
MCH RBC QN AUTO: 28.1 PG (ref 26–34)
MCHC RBC AUTO-ENTMCNC: 32.2 G/DL (ref 30–36.5)
MCV RBC AUTO: 87.3 FL (ref 80–99)
MONOCYTES # BLD: 0.6 K/UL (ref 0–1)
MONOCYTES NFR BLD: 11 % (ref 5–13)
NEUTS SEG # BLD: 2.3 K/UL (ref 1.8–8)
NEUTS SEG NFR BLD: 44 % (ref 32–75)
NRBC # BLD: 0 K/UL (ref 0–0.01)
NRBC BLD-RTO: 0 PER 100 WBC
PHOSPHATE SERPL-MCNC: 3.8 MG/DL (ref 2.6–4.7)
PLATELET # BLD AUTO: 289 K/UL (ref 150–400)
PMV BLD AUTO: 9.1 FL (ref 8.9–12.9)
POTASSIUM SERPL-SCNC: 3.9 MMOL/L (ref 3.5–5.1)
RBC # BLD AUTO: 3.95 M/UL (ref 3.8–5.2)
RBC MORPH BLD: ABNORMAL
SERVICE CMNT-IMP: ABNORMAL
SERVICE CMNT-IMP: NORMAL
SODIUM SERPL-SCNC: 131 MMOL/L (ref 136–145)
WBC # BLD AUTO: 5.3 K/UL (ref 3.6–11)

## 2024-03-27 PROCEDURE — 2580000003 HC RX 258: Performed by: INTERNAL MEDICINE

## 2024-03-27 PROCEDURE — 85025 COMPLETE CBC W/AUTO DIFF WBC: CPT

## 2024-03-27 PROCEDURE — 6360000002 HC RX W HCPCS: Performed by: INTERNAL MEDICINE

## 2024-03-27 PROCEDURE — 36415 COLL VENOUS BLD VENIPUNCTURE: CPT

## 2024-03-27 PROCEDURE — 6370000000 HC RX 637 (ALT 250 FOR IP): Performed by: INTERNAL MEDICINE

## 2024-03-27 PROCEDURE — 80069 RENAL FUNCTION PANEL: CPT

## 2024-03-27 PROCEDURE — 94640 AIRWAY INHALATION TREATMENT: CPT

## 2024-03-27 PROCEDURE — 82962 GLUCOSE BLOOD TEST: CPT

## 2024-03-27 RX ORDER — INSULIN GLARGINE 100 [IU]/ML
24 INJECTION, SOLUTION SUBCUTANEOUS DAILY
Qty: 10 ML | Refills: 1 | Status: SHIPPED | OUTPATIENT
Start: 2024-03-28

## 2024-03-27 RX ORDER — INSULIN ASPART 100 [IU]/ML
INJECTION, SOLUTION INTRAVENOUS; SUBCUTANEOUS
Qty: 20 ML | Refills: 11 | Status: SHIPPED | OUTPATIENT
Start: 2024-03-27

## 2024-03-27 RX ORDER — INSULIN GLARGINE 100 [IU]/ML
24 INJECTION, SOLUTION SUBCUTANEOUS DAILY
Qty: 10 ML | Refills: 1 | Status: SHIPPED | OUTPATIENT
Start: 2024-03-28 | End: 2024-03-27

## 2024-03-27 RX ORDER — INSULIN ASPART 100 [IU]/ML
INJECTION, SOLUTION INTRAVENOUS; SUBCUTANEOUS
Qty: 20 ML | Refills: 11 | Status: SHIPPED | OUTPATIENT
Start: 2024-03-27 | End: 2024-03-27

## 2024-03-27 RX ADMIN — BUSPIRONE HYDROCHLORIDE 15 MG: 10 TABLET ORAL at 08:46

## 2024-03-27 RX ADMIN — MORPHINE SULFATE 105 MG: 30 TABLET, FILM COATED, EXTENDED RELEASE ORAL at 08:46

## 2024-03-27 RX ADMIN — Medication 16 G: at 10:36

## 2024-03-27 RX ADMIN — ROSUVASTATIN CALCIUM 40 MG: 40 TABLET, FILM COATED ORAL at 08:46

## 2024-03-27 RX ADMIN — INSULIN GLARGINE 24 UNITS: 100 INJECTION, SOLUTION SUBCUTANEOUS at 08:47

## 2024-03-27 RX ADMIN — INSULIN LISPRO 4 UNITS: 100 INJECTION, SOLUTION INTRAVENOUS; SUBCUTANEOUS at 08:49

## 2024-03-27 RX ADMIN — ALPRAZOLAM 2 MG: 0.5 TABLET ORAL at 08:49

## 2024-03-27 RX ADMIN — ASPIRIN 81 MG: 81 TABLET, COATED ORAL at 08:46

## 2024-03-27 RX ADMIN — IPRATROPIUM BROMIDE 0.5 MG: 0.5 SOLUTION RESPIRATORY (INHALATION) at 07:19

## 2024-03-27 RX ADMIN — NEPHROCAP 1 MG: 1 CAP ORAL at 08:45

## 2024-03-27 RX ADMIN — CITALOPRAM HYDROBROMIDE 40 MG: 20 TABLET ORAL at 08:46

## 2024-03-27 RX ADMIN — BREXPIPRAZOLE 2 MG: 1 TABLET ORAL at 08:47

## 2024-03-27 RX ADMIN — ENOXAPARIN SODIUM 40 MG: 100 INJECTION SUBCUTANEOUS at 08:45

## 2024-03-27 RX ADMIN — SODIUM CHLORIDE, PRESERVATIVE FREE 10 ML: 5 INJECTION INTRAVENOUS at 09:00

## 2024-03-27 ASSESSMENT — PAIN DESCRIPTION - ORIENTATION: ORIENTATION: LOWER

## 2024-03-27 ASSESSMENT — PAIN SCALES - GENERAL
PAINLEVEL_OUTOF10: 8
PAINLEVEL_OUTOF10: 0

## 2024-03-27 ASSESSMENT — PAIN SCALES - WONG BAKER: WONGBAKER_NUMERICALRESPONSE: NO HURT

## 2024-03-27 ASSESSMENT — PAIN DESCRIPTION - LOCATION: LOCATION: ABDOMEN

## 2024-03-27 NOTE — DISCHARGE INSTRUCTIONS
Patient Discharge Instructions    Anette Cannon / [id number] : 1970    Admitted 3/20/2024 Discharged: 3/27/2024         DISCHARGE DIAGNOSIS:   Hyponatremia likely due to overdiuresis, SIADH  Acute kidney injury  Diabetes mellitus type 2 uncontrolled  Subclinical hypothyroidism  Depression  Chronic narcotic use  Hypertension  GERD  Dyslipidemia      Take Home Medications     {Medication reconciliation information is now added to the patient's AVS automatically when it is printed.  There is no need to use this SmartLink in discharge instructions.  Highlight this text and delete it to clear this message}      General drug facts     If you have a very bad allergy, wear an allergy ID at all times.   It is important that you take the medication exactly as they are prescribed.   Keep your medication in the bottles provided by the pharmacist.  Keep a list of all your drugs (prescription, natural products, vitamins, OTC) with you. Give this list to your doctor.  Do not take other medications without consulting your doctor.    Do not share your drugs with others and do not take anyone else's drugs.   Keep all drugs out of the reach of children and pets.    Most drugs may be thrown away in household trash after mixing with coffee grounds or arcadio litter and sealing in a plastic bag.    Keep a list Call your doctor for help with any side effects. If in the [place] you may also call the FDA at 7-622-RUA-5643    Talk with the doctor before starting any new drug, including OTC, natural products, or vitamins.        What to do at Home    1. Recommended diet: 1200 ML Fluid restriction and low carb     2. Recommended activity: activity as tolerated    3. If you experience any of the following symptoms then please call your primary care physician or return to the emergency room if you cannot get hold of your doctor:    4. Wound Care: None    5. Lab work: cbc and bmp in 1 week, check your blood sugar at least 4 times per day

## 2024-03-27 NOTE — PROGRESS NOTES
Spiritual Care Partner Volunteer visited patient at [facility] in [facility] CARDIOPULMONARY CARE on 3/27/2024   Documented by: Chaplain Trent Garcia M.Div., [facility].   Paging Service: [phone])

## 2024-03-27 NOTE — PLAN OF CARE
Problem: Discharge Planning  Goal: Discharge to home or other facility with appropriate resources  Outcome: Progressing  Flowsheets (Taken 3/26/2024 2000)  Discharge to home or other facility with appropriate resources: Identify barriers to discharge with patient and caregiver     Problem: Pain  Goal: Verbalizes/displays adequate comfort level or baseline comfort level  Outcome: Progressing  Flowsheets (Taken 3/26/2024 1940)  Verbalizes/displays adequate comfort level or baseline comfort level: Encourage patient to monitor pain and request assistance     Problem: Safety - Adult  Goal: Free from fall injury  Outcome: Progressing     Problem: Neurosensory - Adult  Goal: Achieves stable or improved neurological status  Outcome: Progressing  Flowsheets (Taken 3/26/2024 2000)  Achieves stable or improved neurological status: Assess for and report changes in neurological status     Problem: Cardiovascular - Adult  Goal: Maintains optimal cardiac output and hemodynamic stability  Outcome: Progressing  Flowsheets (Taken 3/26/2024 2000)  Maintains optimal cardiac output and hemodynamic stability:   Monitor blood pressure and heart rate   Monitor urine output and notify Licensed Independent Practitioner for values outside of normal range   Assess for signs of decreased cardiac output     Problem: Musculoskeletal - Adult  Goal: Return mobility to safest level of function  Outcome: Progressing  Flowsheets (Taken 3/26/2024 2000)  Return Mobility to Safest Level of Function:   Assist with transfers and ambulation using safe patient handling equipment as needed   Assess patient stability and activity tolerance for standing, transferring and ambulating with or without assistive devices   Ensure adequate protection for wounds/incisions during mobilization     Problem: Metabolic/Fluid and Electrolytes - Adult  Goal: Electrolytes maintained within normal limits  Outcome: Progressing  Flowsheets (Taken 3/26/2024 2000)  Electrolytes  maintained within normal limits:   Monitor labs and assess patient for signs and symptoms of electrolyte imbalances   Administer electrolyte replacement as ordered     Problem: Chronic Conditions and Co-morbidities  Goal: Patient's chronic conditions and co-morbidity symptoms are monitored and maintained or improved  Outcome: Progressing  Flowsheets (Taken 3/26/2024 2000)  Care Plan - Patient's Chronic Conditions and Co-Morbidity Symptoms are Monitored and Maintained or Improved: Monitor and assess patient's chronic conditions and comorbid symptoms for stability, deterioration, or improvement

## 2024-03-27 NOTE — DISCHARGE SUMMARY
Discharge Summary    Name: Anette Cannon  [id number]  YOB: 1970 (Age: 54 y.o.)   Date of Admission: 3/20/2024  Date of Discharge: 3/27/2024  Attending Physician: Rolando Minor MD    Discharge Diagnosis:     Hyponatremia likely due to overdiuresis, SIADH  Acute kidney injury  Diabetes mellitus type 2 uncontrolled  Subclinical hypothyroidism  Depression  Chronic narcotic use  Hypertension  GERD  Dyslipidemia    Consultations:  IP CONSULT TO HOSPITALIST  IP CONSULT TO NEPHROLOGY  IP CONSULT TO ENDOCRINOLOGY  IP CONSULT TO CARDIOLOGY      Brief Admission History/Reason for Admission Per Aron Bennett MD:   Anette Cannon is a 54 y.o.  female with PMHx significant for diabetes mellitus, CKD, GERD, hypertension, chronic pain, HLD who presented to ED with a chief complaint of low sodium.  Patient was recently discharged on March 14 when she was admitted for acute on chronic diastolic heart failure.  She reports she gained 10 pounds in 1 day, so she had labs done.  She discussed with her nephrologist, and he advised to increase her diuretics.  Due to her sodium of 117, she was advised to come to ED.  She reports she takes total 8 mg of Bumex and she takes Aldactone 3 times a day  We were asked to admit for work up and evaluation of the above problems.        Brief Hospital Course by Main Problems:     Hyponatremia likely due to overdiuresis, SIADH  -Sodium currently is improving and is 130.  S/p 3% saline.  Resume PTA Bumex.  Continue fluid restriction at home.  Patient was cleared by renal for discharge for outpatient follow-up in 1 week.          Acute kidney injury  -Baseline creatinine is around 1.4.  Presented with a creatinine of 2.5.  Creatinine now trending down and is 1.3.  Stop her home Aldactone.  Check BMP in 1 week.  Follow-up with nephrologist Dr. Bennett on outpatient basis in 1 week.          Diabetes mellitus type 2 uncontrolled  -Appreciate assistance

## 2024-03-27 NOTE — PROGRESS NOTES
Hospital Medicine TWICE WEEKLY Progress Note    Date of Service  3/14/2022    Chief Complaint  Zully Lin is a 71 y.o. female admitted 6/11/2021 with delusions    Hospital Course  This is a 71 year old female with PMHx of bipolar disorder, schizoaffective disorder, hypothyroidism, and dementia who was transferred from [facility] on 06/11/2021 due to worsening agitation and delusions.    Patient was evaluated by speech for cognitive evaluation, she did rather well in all domains, It is unclear if she has underlying dementia or if her presentation is psychiatric. Patient was evaluated by psychiatry, and her medications were adjusted. Patient refusing to take risperidone 0.5 every morning and risperidone 1 mg every night, as recommended by psychiatry.    Patient was deemed incapacitated to make medical decisions. She was cleared for discharge 6/2021.  Patient currently has guardian, and pursuing placement in group home in [place] ([facility]). Arranging for care flight to [place].    Pt has received 2 doses of COVID vaccine 1and is now fully vaccinated. Quant negative 12/9/2021.     Interval Problem Update  03/14/22    I evaluated and examined her at the bedside.  She is excited that she is going to be discharged tomorrow.  I discussed plan of care with her.  I discussed plan of care with bedside RN and .    patient, bedside RN and .    Consultants/Specialty  psychiatry    Code Status  Full Code    Disposition  Patient is medically cleared.   Anticipate discharge to Memory Care/ .  Secure group home/skilled nursing facility.  I have placed the appropriate orders for post-discharge needs.    Review of Systems  Review of Systems   Constitutional: Negative for chills, fever and weight loss.   HENT: Negative for hearing loss and tinnitus.    Eyes: Negative for blurred vision, double vision, photophobia and pain.   Respiratory: Negative for cough,  Hospital follow-up PCP transitional care appointment has been scheduled with Dr. Vieira on 4/2/24 at 1100. This is a previously scheduled appt. Pending patient discharge. Amy Crystal, Care Management Assistant    sputum production and shortness of breath.    Cardiovascular: Negative for chest pain, palpitations, orthopnea and leg swelling.   Gastrointestinal: Negative for abdominal pain, constipation, diarrhea, nausea and vomiting.   Genitourinary: Negative for dysuria, frequency and urgency.   Musculoskeletal: Negative for back pain, joint pain, myalgias and neck pain.   Skin: Negative for rash.   Neurological: Negative for dizziness, tingling, tremors, sensory change, speech change, focal weakness and headaches.   Psychiatric/Behavioral: Negative for hallucinations and substance abuse.        Delusion   All other systems reviewed and are negative.     No acute complaints.      Physical Exam  Temp:  [36.4 °C (97.5 °F)-36.7 °C (98.1 °F)] 36.4 °C (97.5 °F)  Pulse:  [68-81] 79  Resp:  [16-20] 18  BP: (108-139)/(62-76) 124/73  SpO2:  [94 %-98 %] 96 %    Physical Exam  Vitals and nursing note reviewed.   Constitutional:       General: She is not in acute distress.     Appearance: Normal appearance.   HENT:      Head: Normocephalic and atraumatic.      Mouth/Throat:      Mouth: Mucous membranes are moist.      Pharynx: No oropharyngeal exudate.   Eyes:      Extraocular Movements: Extraocular movements intact.      Pupils: Pupils are equal, round, and reactive to light.   Cardiovascular:      Rate and Rhythm: Normal rate and regular rhythm.      Pulses: Normal pulses.      Heart sounds: No murmur heard.    No friction rub. No gallop.   Pulmonary:      Effort: Pulmonary effort is normal. No respiratory distress.      Breath sounds: No wheezing, rhonchi or rales.   Abdominal:      General: Bowel sounds are normal. There is no distension.      Palpations: Abdomen is soft. There is no mass.      Tenderness: There is no abdominal tenderness.   Musculoskeletal:         General: No swelling or tenderness. Normal range of motion.      Cervical back: Normal range of motion. No rigidity. No muscular tenderness.      Right lower leg: No  edema.      Left lower leg: No edema.   Skin:     General: Skin is warm and dry.      Capillary Refill: Capillary refill takes less than 2 seconds.      Findings: No erythema or rash.   Neurological:      General: No focal deficit present.      Mental Status: She is alert and oriented to person, place, and time.   Psychiatric:         Thought Content: Thought content is delusional.      Comments: Continue to have delusion          I performed physical exam on March 15, 2022 remain similar without any acute changes.      Fluids    Intake/Output Summary (Last 24 hours) at 3/14/2022 2251  Last data filed at 3/14/2022 1800  Gross per 24 hour   Intake 1380 ml   Output --   Net 1380 ml       Laboratory                        Imaging  DX-CHEST-LIMITED (1 VIEW)   Final Result      Left basilar atelectasis. No focal consolidation. No effusions.           Assessment/Plan  * Delusional disorder- (present on admission)  Assessment & Plan  -Patient was evaluated by psychiatry, her medications were adjusted, patient was deemed incapacitated to make medical decisions. Patient cleared for discharge 6/2021.    -Patient currently has guardian, has an accepting group home. Plan to D/C on Tues 3/15.  -Patient compliant with PO Abilify, and now taking medications with mixed with food/drinks. Continue abilify to 5mg daily, may need to further increase depending on persistence of delusions. Can transition to Abilify ER IM once stable on oral medications for 2 weeks.     History of dementia- (present on admission)  Assessment & Plan  -At baseline  -Patient currently has guardian, has an accepting group home. Plan to D/C on Tues 3/15.  -Now fully vaccinated.   -Has guardian.    Schizoaffective disorder, bipolar type (HCC)- (present on admission)  Assessment & Plan  -Patient has been refusing medications and blood draw due to paranoia.    -Actively delusional  -Patient was evaluated by psychiatry, her medications were adjusted, patient was  deemed incapacitated to make medical decisions. Patient cleared for discharge 6/2021.  Patient currently has guardian, has an accepting group home. Plan to D/C on Tues 3/15.  -Patient taking Abilify. With increased/persistent delusions increased abilify to 5mg daily, may need to further adjust based on improvement. Plan to transition to Abilify ER IM once stable and oral medications for 2 weeks.     Hypothyroidism- (present on admission)  Assessment & Plan  -Patient refuses to take her medications.  She refused blood draw on multiple tries     Noncompliance with treatment plan- (present on admission)  Assessment & Plan  -Patient is now taking abilify with foods/drinks.  Transition to long-acting medications once stable on orals for 2 weeks.         I reviewed above assessment and plan today on  March 14, 2022.  No acute changes.  Plan is to discharge her tomorrow.    VTE prophylaxis: SCDs/TEDs    She is mobilizin not on pharmacological DVT prophylaxis.

## 2024-03-27 NOTE — TELEPHONE ENCOUNTER
3/27/2024  12:46 PM    Pt called and stated she is in the hospital at [facility] waiting to be discharged. She will like a call back. Pt can be reached at [phone].    Thank you, Evelia James

## 2024-03-27 NOTE — TELEPHONE ENCOUNTER
Pt LVM stating she is waiting to be discharged and is waiting until Dr Schmitt gives the \"okay.\"   done

## 2024-03-27 NOTE — PROGRESS NOTES
Endocrinology Progress Note    I'm at [facility] today and reviewed her chart remotely and discussed her case over the phone with Dr. Minor.  Her blood sugars are much better controlled and she did not need any lantus last night as her pain was better controlled.  Her fasting sugar was 162 and she did drop to the 70s before lunch today but felt fine.  Her Na is 131 and she is stable for d/c from renal perspective and I'm fine with her going today.  I spoke to her over the phone and let her know I will be cancelling her f/u appt with me that was scheduled for tomorrow at 10:10 am since I just saw her in the hospital and will reach out to reschedule for sometime later in April as I will be away on Spring Break next week.  I told her I will put the following plan in her discharge paperwork and she voiced understanding of this plan.    Dr. Schmitt's discharge instructions:    1) Take lantus 24 units every morning    2) Check your sugar before you eat and dose your humalog based on the scale below:  Blood sugar  Insulin dose          Less than 90  Eat first, take 5 units       6 units    201-250  7 units     251-300  8 units      301-350  9 units      351-400  10 units  401-450  11 units  451-500  12 units  Over 500  13 units    3) If you have a lot of pain during the day and your sugar is running over 250 at bedtime, then take a dose of 4 units of lantus at bedtime.    4) I will cancel your follow up appointment for Thurs 3/28/24 at 10:10am and our office will call you to reschedule sometime after 4/8/24 when I return from spring break.    5) Please don't hesitate to call [phone] with further questions.      Please don't hesitate to send me a message through perfect serve with any questions or concerns.    Steven Schmitt MD

## 2024-03-27 NOTE — CARE COORDINATION
03/27/24 1337   Services At/After Discharge   Transition of Care Consult (CM Consult) N/A   Services At/After Discharge None   [facility] Resource Information Provided? No   Mode of Transport at Discharge Other (see comment)   Confirm Follow Up Transport Family   Condition of Participation: Discharge Planning   The Plan for Transition of Care is related to the following treatment goals: PCP and Specialist     No further needs from CM for DC  Family to transport patient home    English [place] DELVALLE     [phone]

## 2024-03-27 NOTE — PROGRESS NOTES
Nephrology Progress Note  [facility] [place] [facility]  [address], [address]  [place] [address]  Phone - [phone]  Fax - [phone]                 Patient: Anette Cannon                     YOB: 1970        Date- 3/27/2024                                     Admit Date: 3/20/2024   CC: Follow up for hyponatremia  IMPRESSION & PLAN:   hyponatremia  due to non compliance to fluid restriction + chf   PERLA stage II(suspect secondary to overdiuresis, volume depletion)  CKD stage III(baseline 1.1-1.2)  Hypovolemia  Hypokalemia  Metabolic alkalosis  Type 1 diabetes  Chronic pain syndrome      PLAN-  Resume bumex  No more urea powder   No SAMSCA needed  She needs to follow fluid restriction 1200 ml/day  Check bmp this pm  OKAY TO D/C HOME TODAY- renal stand point      Subjective:  Interval History:   3-27-24---na improved to 131-- no sob-- she is on room air  3-26-24--She remained non compliant to fluid restriction. She was walking in lobby to get ice.  Na stable- low 128  Cr stable  Urine out put 2200 ml    Objective:   Vitals:    03/27/24 0347 03/27/24 0516 03/27/24 0719 03/27/24 0800   BP:  (!) 125/55  114/79   Pulse:  72  71   Resp:  19  18   Temp:  97.4 °F (36.3 °C)  97.6 °F (36.4 °C)   TempSrc:  Oral  Oral   SpO2:  93% 95% 92%   Weight: 80.5 kg (177 lb 7.5 oz)      Height:          I/O last 3 completed shifts:  In: 1590 [P.O.:1590]  Out: 2900 [Urine:2900]  I/O this shift:  In: 422 [P.O.:422]  Out: 400 [Urine:400]      Physical exam:    GEN:  NAD  NECK:  Supple, no thyromegaly  RESP: Clear  b/l, no  wheezing,   CVS: RRR,S1,S2   NEURO: non focal, normal speech  EXT: Edema +nt           Chart reviewed.         Pertinent Notes reviewed.     Data Review :  Lab Results   Component Value Date/Time     03/27/2024 06:15 AM    K 3.9 03/27/2024 06:15 AM    CL 95 03/27/2024 06:15 AM    CO2 29 03/27/2024 06:15 AM    BUN 33 03/27/2024

## 2024-03-29 ENCOUNTER — TELEPHONE (OUTPATIENT)
Age: 54
End: 2024-03-29

## 2024-03-29 NOTE — TELEPHONE ENCOUNTER
Laurel,  Please let her know I will be checking my schedule when I get back from spring break the week of 4/8/24 and will try to see her sometime in the 1-2 weeks after the 8th.  Thanks!

## 2024-03-29 NOTE — TELEPHONE ENCOUNTER
3/29/2024  10:53 AM  Pt calling; pt states that she was in the hospital and states that  was to make her a F/U appt; pt askedf to be transferred to 's nurse VM to also leave a message; Pt transferred to nurses's VM; please advise     Thank You,  Rosario Maldonado

## 2024-03-29 NOTE — TELEPHONE ENCOUNTER
Patient notified of message per Dr. Schmitt and voiced understanding of what was read to them.   Pt also states she has an appt coming up with Dr La. She asked if Dr Schmitt could order potassium and sodium to be drawn so they can have the results. Pt also wanted to let Dr Schmitt know that she has been taken off of spironolactone and vit B complex.

## 2024-04-02 LAB
ALBUMIN SERPL-MCNC: 4.3 G/DL (ref 3.8–4.9)
ALBUMIN/CREAT UR: 8 MG/G CREAT (ref 0–29)
ALBUMIN/GLOB SERPL: 1.8 {RATIO} (ref 1.2–2.2)
ALP SERPL-CCNC: 89 IU/L (ref 44–121)
ALT SERPL-CCNC: 30 IU/L (ref 0–32)
AST SERPL-CCNC: 37 IU/L (ref 0–40)
BILIRUB SERPL-MCNC: <0.2 MG/DL (ref 0–1.2)
BUN SERPL-MCNC: 33 MG/DL (ref 6–24)
BUN/CREAT SERPL: 24 (ref 9–23)
CALCIUM SERPL-MCNC: 9.1 MG/DL (ref 8.7–10.2)
CHLORIDE SERPL-SCNC: 94 MMOL/L (ref 96–106)
CHOLEST SERPL-MCNC: 126 MG/DL (ref 100–199)
CO2 SERPL-SCNC: 29 MMOL/L (ref 20–29)
CREAT SERPL-MCNC: 1.38 MG/DL (ref 0.57–1)
CREAT UR-MCNC: 56.1 MG/DL
EGFRCR SERPLBLD CKD-EPI 2021: 46 ML/MIN/1.73
GLOBULIN SER CALC-MCNC: 2.4 G/DL (ref 1.5–4.5)
GLUCOSE SERPL-MCNC: 154 MG/DL (ref 70–99)
HBA1C MFR BLD: 9.3 % (ref 4.8–5.6)
HDLC SERPL-MCNC: 63 MG/DL
IMP & REVIEW OF LAB RESULTS: NORMAL
LDLC SERPL CALC-MCNC: 46 MG/DL (ref 0–99)
Lab: NORMAL
MICROALBUMIN UR-MCNC: 4.6 UG/ML
POTASSIUM SERPL-SCNC: 5 MMOL/L (ref 3.5–5.2)
PROT SERPL-MCNC: 6.7 G/DL (ref 6–8.5)
REPORT: NORMAL
REPORT: NORMAL
SODIUM SERPL-SCNC: 138 MMOL/L (ref 134–144)
TRIGL SERPL-MCNC: 87 MG/DL (ref 0–149)
VLDLC SERPL CALC-MCNC: 17 MG/DL (ref 5–40)

## 2024-04-02 NOTE — TELEPHONE ENCOUNTER
Please let her know I saw her labs were drawn under my name and her sodium is currently normal. Please print off a copy of her labs and fax to this doctor. I will still plan on checking my schedule for an appointment with me when I return from vacation next week.

## 2024-04-03 NOTE — TELEPHONE ENCOUNTER
Patient notified of message per Dr. Schmitt and voiced understanding of what was read to them.   Last labs faxed to Dr La [phone], confirmation received.

## 2024-04-08 ENCOUNTER — APPOINTMENT (OUTPATIENT)
Facility: HOSPITAL | Age: 54
DRG: 291 | End: 2024-04-08
Payer: MEDICARE

## 2024-04-08 ENCOUNTER — HOSPITAL ENCOUNTER (INPATIENT)
Facility: HOSPITAL | Age: 54
LOS: 3 days | Discharge: HOME | DRG: 291 | End: 2024-04-11
Attending: STUDENT IN AN ORGANIZED HEALTH CARE EDUCATION/TRAINING PROGRAM | Admitting: STUDENT IN AN ORGANIZED HEALTH CARE EDUCATION/TRAINING PROGRAM
Payer: MEDICARE

## 2024-04-08 DIAGNOSIS — E87.1 HYPONATREMIA: ICD-10-CM

## 2024-04-08 DIAGNOSIS — J18.9 PNEUMONIA DUE TO INFECTIOUS ORGANISM, UNSPECIFIED LATERALITY, UNSPECIFIED PART OF LUNG: Primary | ICD-10-CM

## 2024-04-08 DIAGNOSIS — I50.9 ACUTE CONGESTIVE HEART FAILURE, UNSPECIFIED HEART FAILURE TYPE (HCC): ICD-10-CM

## 2024-04-08 DIAGNOSIS — R09.02 HYPOXIA: ICD-10-CM

## 2024-04-08 LAB
ALBUMIN SERPL-MCNC: 3.5 G/DL (ref 3.5–5)
ALBUMIN/GLOB SERPL: 1.2 (ref 1.1–2.2)
ALP SERPL-CCNC: 88 U/L (ref 45–117)
ALT SERPL-CCNC: 32 U/L (ref 12–78)
ANION GAP SERPL CALC-SCNC: 2 MMOL/L (ref 5–15)
APPEARANCE UR: CLEAR
AST SERPL-CCNC: 24 U/L (ref 15–37)
BACTERIA URNS QL MICRO: NEGATIVE /HPF
BASOPHILS # BLD: 0 K/UL (ref 0–0.1)
BASOPHILS NFR BLD: 0 % (ref 0–1)
BILIRUB SERPL-MCNC: 0.5 MG/DL (ref 0.2–1)
BILIRUB UR QL: NEGATIVE
BUN SERPL-MCNC: 25 MG/DL (ref 6–20)
BUN/CREAT SERPL: 18 (ref 12–20)
CALCIUM SERPL-MCNC: 9.1 MG/DL (ref 8.5–10.1)
CHLORIDE SERPL-SCNC: 91 MMOL/L (ref 97–108)
CO2 SERPL-SCNC: 37 MMOL/L (ref 21–32)
COLOR UR: ABNORMAL
CREAT SERPL-MCNC: 1.41 MG/DL (ref 0.55–1.02)
DIFFERENTIAL METHOD BLD: ABNORMAL
EOSINOPHIL # BLD: 0.1 K/UL (ref 0–0.4)
EOSINOPHIL NFR BLD: 2 % (ref 0–7)
EPITH CASTS URNS QL MICRO: ABNORMAL /LPF
ERYTHROCYTE [DISTWIDTH] IN BLOOD BY AUTOMATED COUNT: 19.6 % (ref 11.5–14.5)
GLOBULIN SER CALC-MCNC: 2.9 G/DL (ref 2–4)
GLUCOSE BLD STRIP.AUTO-MCNC: 157 MG/DL (ref 65–117)
GLUCOSE BLD STRIP.AUTO-MCNC: 290 MG/DL (ref 65–117)
GLUCOSE SERPL-MCNC: 359 MG/DL (ref 65–100)
GLUCOSE UR STRIP.AUTO-MCNC: 500 MG/DL
HCT VFR BLD AUTO: 37.8 % (ref 35–47)
HGB BLD-MCNC: 11.6 G/DL (ref 11.5–16)
HGB UR QL STRIP: NEGATIVE
IMM GRANULOCYTES # BLD AUTO: 0 K/UL (ref 0–0.04)
IMM GRANULOCYTES NFR BLD AUTO: 0 % (ref 0–0.5)
KETONES UR QL STRIP.AUTO: NEGATIVE MG/DL
LACTATE BLD-SCNC: 1.4 MMOL/L (ref 0.4–2)
LEUKOCYTE ESTERASE UR QL STRIP.AUTO: ABNORMAL
LYMPHOCYTES # BLD: 1.6 K/UL (ref 0.8–3.5)
LYMPHOCYTES NFR BLD: 30 % (ref 12–49)
MAGNESIUM SERPL-MCNC: 1.9 MG/DL (ref 1.6–2.4)
MCH RBC QN AUTO: 28 PG (ref 26–34)
MCHC RBC AUTO-ENTMCNC: 30.7 G/DL (ref 30–36.5)
MCV RBC AUTO: 91.3 FL (ref 80–99)
MONOCYTES # BLD: 0.4 K/UL (ref 0–1)
MONOCYTES NFR BLD: 7 % (ref 5–13)
NEUTS SEG # BLD: 3.2 K/UL (ref 1.8–8)
NEUTS SEG NFR BLD: 61 % (ref 32–75)
NITRITE UR QL STRIP.AUTO: NEGATIVE
NRBC # BLD: 0 K/UL (ref 0–0.01)
NRBC BLD-RTO: 0 PER 100 WBC
NT PRO BNP: 438 PG/ML
PH UR STRIP: 7.5 (ref 5–8)
PLATELET # BLD AUTO: 229 K/UL (ref 150–400)
PMV BLD AUTO: 9.1 FL (ref 8.9–12.9)
POTASSIUM SERPL-SCNC: 4.5 MMOL/L (ref 3.5–5.1)
PROT SERPL-MCNC: 6.4 G/DL (ref 6.4–8.2)
PROT UR STRIP-MCNC: NEGATIVE MG/DL
RBC # BLD AUTO: 4.14 M/UL (ref 3.8–5.2)
RBC #/AREA URNS HPF: ABNORMAL /HPF (ref 0–5)
SERVICE CMNT-IMP: ABNORMAL
SERVICE CMNT-IMP: ABNORMAL
SODIUM SERPL-SCNC: 130 MMOL/L (ref 136–145)
SP GR UR REFRACTOMETRY: 1.01
TROPONIN I SERPL HS-MCNC: 4 NG/L (ref 0–51)
URINE CULTURE IF INDICATED: ABNORMAL
UROBILINOGEN UR QL STRIP.AUTO: 0.2 EU/DL (ref 0.2–1)
WBC # BLD AUTO: 5.3 K/UL (ref 3.6–11)
WBC URNS QL MICRO: ABNORMAL /HPF (ref 0–4)

## 2024-04-08 PROCEDURE — 71045 X-RAY EXAM CHEST 1 VIEW: CPT

## 2024-04-08 PROCEDURE — 6370000000 HC RX 637 (ALT 250 FOR IP): Performed by: STUDENT IN AN ORGANIZED HEALTH CARE EDUCATION/TRAINING PROGRAM

## 2024-04-08 PROCEDURE — 80053 COMPREHEN METABOLIC PANEL: CPT

## 2024-04-08 PROCEDURE — 2580000003 HC RX 258: Performed by: STUDENT IN AN ORGANIZED HEALTH CARE EDUCATION/TRAINING PROGRAM

## 2024-04-08 PROCEDURE — 36415 COLL VENOUS BLD VENIPUNCTURE: CPT

## 2024-04-08 PROCEDURE — 93005 ELECTROCARDIOGRAM TRACING: CPT | Performed by: STUDENT IN AN ORGANIZED HEALTH CARE EDUCATION/TRAINING PROGRAM

## 2024-04-08 PROCEDURE — 6360000002 HC RX W HCPCS: Performed by: STUDENT IN AN ORGANIZED HEALTH CARE EDUCATION/TRAINING PROGRAM

## 2024-04-08 PROCEDURE — 82962 GLUCOSE BLOOD TEST: CPT

## 2024-04-08 PROCEDURE — 87040 BLOOD CULTURE FOR BACTERIA: CPT

## 2024-04-08 PROCEDURE — 1100000003 HC PRIVATE W/ TELEMETRY

## 2024-04-08 PROCEDURE — 85025 COMPLETE CBC W/AUTO DIFF WBC: CPT

## 2024-04-08 PROCEDURE — 83880 ASSAY OF NATRIURETIC PEPTIDE: CPT

## 2024-04-08 PROCEDURE — 81001 URINALYSIS AUTO W/SCOPE: CPT

## 2024-04-08 PROCEDURE — 6370000000 HC RX 637 (ALT 250 FOR IP): Performed by: NURSE PRACTITIONER

## 2024-04-08 PROCEDURE — 96374 THER/PROPH/DIAG INJ IV PUSH: CPT

## 2024-04-08 PROCEDURE — 83605 ASSAY OF LACTIC ACID: CPT

## 2024-04-08 PROCEDURE — 84484 ASSAY OF TROPONIN QUANT: CPT

## 2024-04-08 PROCEDURE — 99285 EMERGENCY DEPT VISIT HI MDM: CPT

## 2024-04-08 PROCEDURE — 83735 ASSAY OF MAGNESIUM: CPT

## 2024-04-08 RX ORDER — INSULIN GLARGINE 100 [IU]/ML
15 INJECTION, SOLUTION SUBCUTANEOUS NIGHTLY
Status: DISCONTINUED | OUTPATIENT
Start: 2024-04-08 | End: 2024-04-09

## 2024-04-08 RX ORDER — SODIUM CHLORIDE 9 MG/ML
INJECTION, SOLUTION INTRAVENOUS PRN
Status: DISCONTINUED | OUTPATIENT
Start: 2024-04-08 | End: 2024-04-11 | Stop reason: HOSPADM

## 2024-04-08 RX ORDER — SODIUM CHLORIDE 0.9 % (FLUSH) 0.9 %
5-40 SYRINGE (ML) INJECTION EVERY 12 HOURS SCHEDULED
Status: DISCONTINUED | OUTPATIENT
Start: 2024-04-08 | End: 2024-04-11 | Stop reason: HOSPADM

## 2024-04-08 RX ORDER — INSULIN LISPRO 100 [IU]/ML
0-4 INJECTION, SOLUTION INTRAVENOUS; SUBCUTANEOUS NIGHTLY
Status: DISCONTINUED | OUTPATIENT
Start: 2024-04-08 | End: 2024-04-11 | Stop reason: HOSPADM

## 2024-04-08 RX ORDER — SODIUM CHLORIDE 0.9 % (FLUSH) 0.9 %
5-40 SYRINGE (ML) INJECTION PRN
Status: DISCONTINUED | OUTPATIENT
Start: 2024-04-08 | End: 2024-04-11 | Stop reason: HOSPADM

## 2024-04-08 RX ORDER — POLYETHYLENE GLYCOL 3350 17 G/17G
17 POWDER, FOR SOLUTION ORAL DAILY PRN
Status: DISCONTINUED | OUTPATIENT
Start: 2024-04-08 | End: 2024-04-11 | Stop reason: HOSPADM

## 2024-04-08 RX ORDER — ALPRAZOLAM 0.5 MG/1
2 TABLET ORAL 4 TIMES DAILY PRN
Status: DISCONTINUED | OUTPATIENT
Start: 2024-04-08 | End: 2024-04-11 | Stop reason: HOSPADM

## 2024-04-08 RX ORDER — POTASSIUM CHLORIDE 20 MEQ/1
40 TABLET, EXTENDED RELEASE ORAL PRN
Status: DISCONTINUED | OUTPATIENT
Start: 2024-04-08 | End: 2024-04-09

## 2024-04-08 RX ORDER — ACETAMINOPHEN 650 MG/1
650 SUPPOSITORY RECTAL EVERY 6 HOURS PRN
Status: DISCONTINUED | OUTPATIENT
Start: 2024-04-08 | End: 2024-04-11 | Stop reason: HOSPADM

## 2024-04-08 RX ORDER — ACETAMINOPHEN 325 MG/1
650 TABLET ORAL EVERY 6 HOURS PRN
Status: DISCONTINUED | OUTPATIENT
Start: 2024-04-08 | End: 2024-04-11 | Stop reason: HOSPADM

## 2024-04-08 RX ORDER — MAGNESIUM SULFATE IN WATER 40 MG/ML
2000 INJECTION, SOLUTION INTRAVENOUS PRN
Status: DISCONTINUED | OUTPATIENT
Start: 2024-04-08 | End: 2024-04-09

## 2024-04-08 RX ORDER — OXYCODONE HYDROCHLORIDE 5 MG/1
5 TABLET ORAL EVERY 4 HOURS PRN
Status: DISCONTINUED | OUTPATIENT
Start: 2024-04-08 | End: 2024-04-11 | Stop reason: HOSPADM

## 2024-04-08 RX ORDER — OXYCODONE HYDROCHLORIDE 5 MG/1
5 TABLET ORAL EVERY 4 HOURS PRN
Status: CANCELLED | OUTPATIENT
Start: 2024-04-08

## 2024-04-08 RX ORDER — POTASSIUM CHLORIDE 7.45 MG/ML
10 INJECTION INTRAVENOUS PRN
Status: DISCONTINUED | OUTPATIENT
Start: 2024-04-08 | End: 2024-04-09

## 2024-04-08 RX ORDER — CITALOPRAM 20 MG/1
40 TABLET ORAL DAILY
Status: DISCONTINUED | OUTPATIENT
Start: 2024-04-08 | End: 2024-04-11 | Stop reason: HOSPADM

## 2024-04-08 RX ORDER — DULOXETIN HYDROCHLORIDE 20 MG/1
20 CAPSULE, DELAYED RELEASE ORAL DAILY
Status: DISCONTINUED | OUTPATIENT
Start: 2024-04-08 | End: 2024-04-11 | Stop reason: HOSPADM

## 2024-04-08 RX ORDER — OXYCODONE HYDROCHLORIDE 5 MG/1
5 TABLET ORAL
Status: COMPLETED | OUTPATIENT
Start: 2024-04-08 | End: 2024-04-08

## 2024-04-08 RX ORDER — GLUCAGON 1 MG/ML
1 KIT INJECTION PRN
Status: DISCONTINUED | OUTPATIENT
Start: 2024-04-08 | End: 2024-04-11 | Stop reason: HOSPADM

## 2024-04-08 RX ORDER — BUMETANIDE 0.25 MG/ML
2 INJECTION INTRAMUSCULAR; INTRAVENOUS ONCE
Status: DISCONTINUED | OUTPATIENT
Start: 2024-04-08 | End: 2024-04-09

## 2024-04-08 RX ORDER — BUMETANIDE 0.25 MG/ML
2 INJECTION INTRAMUSCULAR; INTRAVENOUS ONCE
Status: COMPLETED | OUTPATIENT
Start: 2024-04-08 | End: 2024-04-08

## 2024-04-08 RX ORDER — ONDANSETRON 4 MG/1
4 TABLET, ORALLY DISINTEGRATING ORAL EVERY 8 HOURS PRN
Status: DISCONTINUED | OUTPATIENT
Start: 2024-04-08 | End: 2024-04-11 | Stop reason: HOSPADM

## 2024-04-08 RX ORDER — MORPHINE SULFATE 30 MG/1
90 TABLET, FILM COATED, EXTENDED RELEASE ORAL 2 TIMES DAILY
Status: DISCONTINUED | OUTPATIENT
Start: 2024-04-08 | End: 2024-04-11 | Stop reason: HOSPADM

## 2024-04-08 RX ORDER — ASPIRIN 81 MG/1
81 TABLET ORAL DAILY
Status: DISCONTINUED | OUTPATIENT
Start: 2024-04-08 | End: 2024-04-11 | Stop reason: HOSPADM

## 2024-04-08 RX ORDER — INSULIN LISPRO 100 [IU]/ML
0-8 INJECTION, SOLUTION INTRAVENOUS; SUBCUTANEOUS
Status: DISCONTINUED | OUTPATIENT
Start: 2024-04-08 | End: 2024-04-11 | Stop reason: HOSPADM

## 2024-04-08 RX ORDER — ENOXAPARIN SODIUM 100 MG/ML
40 INJECTION SUBCUTANEOUS DAILY
Status: DISCONTINUED | OUTPATIENT
Start: 2024-04-08 | End: 2024-04-11 | Stop reason: HOSPADM

## 2024-04-08 RX ORDER — PANTOPRAZOLE SODIUM 40 MG/1
40 TABLET, DELAYED RELEASE ORAL
Status: DISCONTINUED | OUTPATIENT
Start: 2024-04-09 | End: 2024-04-11 | Stop reason: HOSPADM

## 2024-04-08 RX ORDER — DEXTROSE MONOHYDRATE 100 MG/ML
INJECTION, SOLUTION INTRAVENOUS CONTINUOUS PRN
Status: DISCONTINUED | OUTPATIENT
Start: 2024-04-08 | End: 2024-04-11 | Stop reason: HOSPADM

## 2024-04-08 RX ORDER — ONDANSETRON 2 MG/ML
4 INJECTION INTRAMUSCULAR; INTRAVENOUS EVERY 6 HOURS PRN
Status: DISCONTINUED | OUTPATIENT
Start: 2024-04-08 | End: 2024-04-11 | Stop reason: HOSPADM

## 2024-04-08 RX ADMIN — ENOXAPARIN SODIUM 40 MG: 100 INJECTION SUBCUTANEOUS at 16:00

## 2024-04-08 RX ADMIN — MORPHINE SULFATE 90 MG: 30 TABLET, FILM COATED, EXTENDED RELEASE ORAL at 23:11

## 2024-04-08 RX ADMIN — SODIUM CHLORIDE, PRESERVATIVE FREE 10 ML: 5 INJECTION INTRAVENOUS at 22:07

## 2024-04-08 RX ADMIN — BUMETANIDE 2 MG: 0.25 INJECTION INTRAMUSCULAR; INTRAVENOUS at 12:04

## 2024-04-08 RX ADMIN — SODIUM CHLORIDE 1000 MG: 900 INJECTION INTRAVENOUS at 13:27

## 2024-04-08 RX ADMIN — CITALOPRAM HYDROBROMIDE 40 MG: 20 TABLET ORAL at 16:00

## 2024-04-08 RX ADMIN — OXYCODONE 5 MG: 5 TABLET ORAL at 12:01

## 2024-04-08 RX ADMIN — ALPRAZOLAM 2 MG: 0.5 TABLET ORAL at 23:11

## 2024-04-08 RX ADMIN — BUSPIRONE HYDROCHLORIDE 15 MG: 10 TABLET ORAL at 16:00

## 2024-04-08 RX ADMIN — AZITHROMYCIN MONOHYDRATE 500 MG: 500 INJECTION, POWDER, LYOPHILIZED, FOR SOLUTION INTRAVENOUS at 14:07

## 2024-04-08 RX ADMIN — OXYCODONE 5 MG: 5 TABLET ORAL at 17:18

## 2024-04-08 RX ADMIN — INSULIN LISPRO 4 UNITS: 100 INJECTION, SOLUTION INTRAVENOUS; SUBCUTANEOUS at 16:41

## 2024-04-08 RX ADMIN — BUSPIRONE HYDROCHLORIDE 15 MG: 10 TABLET ORAL at 22:02

## 2024-04-08 RX ADMIN — ASPIRIN 81 MG: 81 TABLET, COATED ORAL at 16:00

## 2024-04-08 RX ADMIN — INSULIN GLARGINE 15 UNITS: 100 INJECTION, SOLUTION SUBCUTANEOUS at 22:02

## 2024-04-08 RX ADMIN — DULOXETINE HYDROCHLORIDE 20 MG: 20 CAPSULE, DELAYED RELEASE ORAL at 16:00

## 2024-04-08 ASSESSMENT — PAIN DESCRIPTION - ORIENTATION
ORIENTATION: RIGHT
ORIENTATION: LEFT;RIGHT
ORIENTATION: LEFT;RIGHT
ORIENTATION: RIGHT

## 2024-04-08 ASSESSMENT — PAIN SCALES - GENERAL
PAINLEVEL_OUTOF10: 0
PAINLEVEL_OUTOF10: 9
PAINLEVEL_OUTOF10: 5
PAINLEVEL_OUTOF10: 8
PAINLEVEL_OUTOF10: 7
PAINLEVEL_OUTOF10: 8
PAINLEVEL_OUTOF10: 4

## 2024-04-08 ASSESSMENT — PAIN DESCRIPTION - LOCATION
LOCATION: ABDOMEN
LOCATION: FOOT
LOCATION: FOOT
LOCATION: KNEE
LOCATION: KNEE

## 2024-04-08 ASSESSMENT — PAIN DESCRIPTION - DESCRIPTORS
DESCRIPTORS: ACHING
DESCRIPTORS: ACHING
DESCRIPTORS: SHARP
DESCRIPTORS: ACHING;BURNING;CRAMPING
DESCRIPTORS: ACHING;BURNING

## 2024-04-08 ASSESSMENT — LIFESTYLE VARIABLES
HOW OFTEN DO YOU HAVE A DRINK CONTAINING ALCOHOL: NEVER
HOW MANY STANDARD DRINKS CONTAINING ALCOHOL DO YOU HAVE ON A TYPICAL DAY: PATIENT DOES NOT DRINK
HOW OFTEN DO YOU HAVE A DRINK CONTAINING ALCOHOL: NEVER

## 2024-04-08 ASSESSMENT — PAIN DESCRIPTION - PAIN TYPE: TYPE: CHRONIC PAIN

## 2024-04-08 NOTE — H&P
evidence of acute deep vein thrombosis in the left lower extremity. No evidence of deep vein or superficial vein thrombosis in the left lower extremity. Vessels demonstrate normal compressibility, color filling, and phasic and spontaneous flow.     XR CHEST PORTABLE    Result Date: 3/12/2024  EXAM:  XR CHEST PORTABLE INDICATION: Shortness of breath COMPARISON: 7/31/2023, 7/11/2023, 6/30/2022 TECHNIQUE: portable chest AP view FINDINGS: Heart size is upper normal but stable mild prominence of pulmonary vasculature. Lung volumes are moderate with mild interstitial prominence and central vascular fullness The visualized bones and upper abdomen are age-appropriate. A linear density projecting over the right lung apex is thought to represent a skin fold.     Mild interstitial edema pattern is possible. Correlate clinically.      _______________________________________________________________________    TOTAL TIME:  76 Minutes    Critical Care Provided     Minutes non procedure based    Signed: Vianey Olson MD    Procedures: see electronic medical records for all procedures/Xrays and details which were not copied into this note but were reviewed prior to creation of Plan.

## 2024-04-08 NOTE — ED PROVIDER NOTES
EMERGENCY DEPARTMENT HISTORY AND PHYSICAL EXAM      Date: 4/8/2024  Patient Name: Anette Cannon    History of Presenting Illness     Chief Complaint   Patient presents with    Shortness of Breath     EMS reports call for weight gain:  20# in one day.  Oxygen at home at times, initial sat 88% on RA, then 97% on 3L NC.  FSBS elevated to 503.         HPI: History From: patient, History limited by: none  Anette Cannon, 54 y.o. female presents to the ED with cc of shortness of breath and swelling.  She states that she has gained 20 pounds in the last few days, and feels like she needs to be diuresed.  She has not noted increasing swelling and subsequent discomfort in both of her legs.  She has felt more short of breath on exertion.  Also yesterday, she reports an increase in her cough, productive of brown sputum.  She denies chest pain or fevers, no vomiting, diarrhea or dysuria.  She has been compliant with all her medications including her Bumex twice a day, she states that her nephrologist recently increased the dose.  She is on baseline 3 L of oxygen as needed, she says she usually only uses it at night.          There are no other complaints, changes, or physical findings at this time.    PCP: Jair Vieira MD    No current facility-administered medications on file prior to encounter.     Current Outpatient Medications on File Prior to Encounter   Medication Sig Dispense Refill    NOVOLOG 100 UNIT/ML injection vial Inject 6 units before meals + 1 units for every 50 mg/dl above 150 mg/dl--max 33 units/day--Dose change 12/12/23--updated med list--did not send prescription to the pharmacy 20 mL 11    insulin glargine (LANTUS) 100 UNIT/ML injection vial Inject 24 Units into the skin daily 10 mL 1    fluticasone (FLONASE) 50 MCG/ACT nasal spray 1 spray by Each Nostril route daily as needed for Rhinitis      DULoxetine (CYMBALTA) 20 MG extended release capsule Take 1 capsule by mouth daily      Continuous Blood

## 2024-04-09 LAB
ANION GAP SERPL CALC-SCNC: 3 MMOL/L (ref 5–15)
BASOPHILS # BLD: 0 K/UL (ref 0–0.1)
BASOPHILS NFR BLD: 1 % (ref 0–1)
BUN SERPL-MCNC: 26 MG/DL (ref 6–20)
BUN/CREAT SERPL: 17 (ref 12–20)
CALCIUM SERPL-MCNC: 8.2 MG/DL (ref 8.5–10.1)
CHLORIDE SERPL-SCNC: 97 MMOL/L (ref 97–108)
CO2 SERPL-SCNC: 36 MMOL/L (ref 21–32)
CREAT SERPL-MCNC: 1.5 MG/DL (ref 0.55–1.02)
DIFFERENTIAL METHOD BLD: ABNORMAL
EKG ATRIAL RATE: 62 BPM
EKG DIAGNOSIS: NORMAL
EKG P AXIS: 66 DEGREES
EKG P-R INTERVAL: 180 MS
EKG Q-T INTERVAL: 444 MS
EKG QRS DURATION: 84 MS
EKG QTC CALCULATION (BAZETT): 450 MS
EKG R AXIS: -21 DEGREES
EKG T AXIS: 43 DEGREES
EKG VENTRICULAR RATE: 62 BPM
EOSINOPHIL # BLD: 0.2 K/UL (ref 0–0.4)
EOSINOPHIL NFR BLD: 4 % (ref 0–7)
ERYTHROCYTE [DISTWIDTH] IN BLOOD BY AUTOMATED COUNT: 19.5 % (ref 11.5–14.5)
GLUCOSE BLD STRIP.AUTO-MCNC: 115 MG/DL (ref 65–117)
GLUCOSE BLD STRIP.AUTO-MCNC: 120 MG/DL (ref 65–117)
GLUCOSE BLD STRIP.AUTO-MCNC: 163 MG/DL (ref 65–117)
GLUCOSE BLD STRIP.AUTO-MCNC: 180 MG/DL (ref 65–117)
GLUCOSE BLD STRIP.AUTO-MCNC: 223 MG/DL (ref 65–117)
GLUCOSE SERPL-MCNC: 91 MG/DL (ref 65–100)
HCT VFR BLD AUTO: 33.1 % (ref 35–47)
HGB BLD-MCNC: 10.5 G/DL (ref 11.5–16)
IMM GRANULOCYTES # BLD AUTO: 0 K/UL (ref 0–0.04)
IMM GRANULOCYTES NFR BLD AUTO: 0 % (ref 0–0.5)
LYMPHOCYTES # BLD: 2.5 K/UL (ref 0.8–3.5)
LYMPHOCYTES NFR BLD: 50 % (ref 12–49)
MCH RBC QN AUTO: 29.2 PG (ref 26–34)
MCHC RBC AUTO-ENTMCNC: 31.7 G/DL (ref 30–36.5)
MCV RBC AUTO: 91.9 FL (ref 80–99)
MONOCYTES # BLD: 0.5 K/UL (ref 0–1)
MONOCYTES NFR BLD: 10 % (ref 5–13)
NEUTS SEG # BLD: 1.7 K/UL (ref 1.8–8)
NEUTS SEG NFR BLD: 35 % (ref 32–75)
NRBC # BLD: 0 K/UL (ref 0–0.01)
NRBC BLD-RTO: 0 PER 100 WBC
PLATELET # BLD AUTO: 190 K/UL (ref 150–400)
PMV BLD AUTO: 9.3 FL (ref 8.9–12.9)
POTASSIUM SERPL-SCNC: 4.1 MMOL/L (ref 3.5–5.1)
RBC # BLD AUTO: 3.6 M/UL (ref 3.8–5.2)
SERVICE CMNT-IMP: ABNORMAL
SERVICE CMNT-IMP: NORMAL
SODIUM SERPL-SCNC: 136 MMOL/L (ref 136–145)
WBC # BLD AUTO: 5 K/UL (ref 3.6–11)

## 2024-04-09 PROCEDURE — 6360000002 HC RX W HCPCS: Performed by: INTERNAL MEDICINE

## 2024-04-09 PROCEDURE — 6370000000 HC RX 637 (ALT 250 FOR IP): Performed by: STUDENT IN AN ORGANIZED HEALTH CARE EDUCATION/TRAINING PROGRAM

## 2024-04-09 PROCEDURE — 80048 BASIC METABOLIC PNL TOTAL CA: CPT

## 2024-04-09 PROCEDURE — 85025 COMPLETE CBC W/AUTO DIFF WBC: CPT

## 2024-04-09 PROCEDURE — 94640 AIRWAY INHALATION TREATMENT: CPT

## 2024-04-09 PROCEDURE — 1100000003 HC PRIVATE W/ TELEMETRY

## 2024-04-09 PROCEDURE — 6370000000 HC RX 637 (ALT 250 FOR IP): Performed by: NURSE PRACTITIONER

## 2024-04-09 PROCEDURE — 2580000003 HC RX 258: Performed by: STUDENT IN AN ORGANIZED HEALTH CARE EDUCATION/TRAINING PROGRAM

## 2024-04-09 PROCEDURE — 6370000000 HC RX 637 (ALT 250 FOR IP): Performed by: INTERNAL MEDICINE

## 2024-04-09 PROCEDURE — 2700000000 HC OXYGEN THERAPY PER DAY

## 2024-04-09 PROCEDURE — 36415 COLL VENOUS BLD VENIPUNCTURE: CPT

## 2024-04-09 PROCEDURE — 82962 GLUCOSE BLOOD TEST: CPT

## 2024-04-09 PROCEDURE — 6360000002 HC RX W HCPCS: Performed by: STUDENT IN AN ORGANIZED HEALTH CARE EDUCATION/TRAINING PROGRAM

## 2024-04-09 RX ORDER — BUMETANIDE 0.25 MG/ML
2 INJECTION INTRAMUSCULAR; INTRAVENOUS EVERY 12 HOURS
Status: DISCONTINUED | OUTPATIENT
Start: 2024-04-09 | End: 2024-04-11 | Stop reason: HOSPADM

## 2024-04-09 RX ORDER — INSULIN GLARGINE 100 [IU]/ML
15 INJECTION, SOLUTION SUBCUTANEOUS DAILY
Status: DISCONTINUED | OUTPATIENT
Start: 2024-04-09 | End: 2024-04-11 | Stop reason: HOSPADM

## 2024-04-09 RX ORDER — BUMETANIDE 0.25 MG/ML
2 INJECTION INTRAMUSCULAR; INTRAVENOUS EVERY 12 HOURS
Status: DISCONTINUED | OUTPATIENT
Start: 2024-04-10 | End: 2024-04-09

## 2024-04-09 RX ORDER — BUMETANIDE 0.25 MG/ML
2 INJECTION INTRAMUSCULAR; INTRAVENOUS 2 TIMES DAILY
Status: DISCONTINUED | OUTPATIENT
Start: 2024-04-09 | End: 2024-04-09

## 2024-04-09 RX ADMIN — SODIUM CHLORIDE 500 MG: 9 INJECTION, SOLUTION INTRAVENOUS at 02:12

## 2024-04-09 RX ADMIN — SODIUM CHLORIDE 1000 MG: 900 INJECTION INTRAVENOUS at 01:27

## 2024-04-09 RX ADMIN — BUMETANIDE 2 MG: 0.25 INJECTION INTRAMUSCULAR; INTRAVENOUS at 17:55

## 2024-04-09 RX ADMIN — DULOXETINE HYDROCHLORIDE 20 MG: 20 CAPSULE, DELAYED RELEASE ORAL at 08:37

## 2024-04-09 RX ADMIN — ASPIRIN 81 MG: 81 TABLET, COATED ORAL at 08:37

## 2024-04-09 RX ADMIN — ENOXAPARIN SODIUM 40 MG: 100 INJECTION SUBCUTANEOUS at 08:37

## 2024-04-09 RX ADMIN — BUSPIRONE HYDROCHLORIDE 15 MG: 10 TABLET ORAL at 21:29

## 2024-04-09 RX ADMIN — OXYCODONE 5 MG: 5 TABLET ORAL at 14:56

## 2024-04-09 RX ADMIN — CITALOPRAM HYDROBROMIDE 40 MG: 20 TABLET ORAL at 08:37

## 2024-04-09 RX ADMIN — MORPHINE SULFATE 90 MG: 30 TABLET, FILM COATED, EXTENDED RELEASE ORAL at 21:28

## 2024-04-09 RX ADMIN — ALPRAZOLAM 2 MG: 0.5 TABLET ORAL at 12:11

## 2024-04-09 RX ADMIN — TIOTROPIUM BROMIDE INHALATION SPRAY 2 PUFF: 3.12 SPRAY, METERED RESPIRATORY (INHALATION) at 07:27

## 2024-04-09 RX ADMIN — MORPHINE SULFATE 90 MG: 30 TABLET, FILM COATED, EXTENDED RELEASE ORAL at 08:38

## 2024-04-09 RX ADMIN — PANTOPRAZOLE SODIUM 40 MG: 40 TABLET, DELAYED RELEASE ORAL at 06:28

## 2024-04-09 RX ADMIN — INSULIN LISPRO 2 UNITS: 100 INJECTION, SOLUTION INTRAVENOUS; SUBCUTANEOUS at 12:10

## 2024-04-09 RX ADMIN — BUMETANIDE 2 MG: 0.25 INJECTION INTRAMUSCULAR; INTRAVENOUS at 12:10

## 2024-04-09 RX ADMIN — SODIUM CHLORIDE, PRESERVATIVE FREE 10 ML: 5 INJECTION INTRAVENOUS at 21:29

## 2024-04-09 RX ADMIN — BUSPIRONE HYDROCHLORIDE 15 MG: 10 TABLET ORAL at 08:37

## 2024-04-09 RX ADMIN — SODIUM CHLORIDE, PRESERVATIVE FREE 10 ML: 5 INJECTION INTRAVENOUS at 08:41

## 2024-04-09 RX ADMIN — BUSPIRONE HYDROCHLORIDE 15 MG: 10 TABLET ORAL at 14:55

## 2024-04-09 RX ADMIN — INSULIN GLARGINE 15 UNITS: 100 INJECTION, SOLUTION SUBCUTANEOUS at 14:55

## 2024-04-09 ASSESSMENT — PAIN DESCRIPTION - LOCATION
LOCATION: ABDOMEN
LOCATION: OTHER (COMMENT);ABDOMEN
LOCATION: ABDOMEN

## 2024-04-09 ASSESSMENT — PAIN SCALES - GENERAL
PAINLEVEL_OUTOF10: 0
PAINLEVEL_OUTOF10: 8
PAINLEVEL_OUTOF10: 5

## 2024-04-09 ASSESSMENT — PAIN DESCRIPTION - ORIENTATION
ORIENTATION: LOWER
ORIENTATION: ANTERIOR

## 2024-04-09 ASSESSMENT — PAIN DESCRIPTION - DESCRIPTORS
DESCRIPTORS: DISCOMFORT;ACHING;CRAMPING
DESCRIPTORS: ACHING

## 2024-04-09 NOTE — CONSULTS
[facility]         NAME:Anette Cannon  MRN:[id number]   :1970     Pt seen  Ckd  Edema  Hyponatremia    Plan   Bumex 2 mg bid  Fluid restriction 1500 ml/day        Hyponatremia [E87.1]  Hypoxia [R09.02]  Pneumonia due to infectious organism, unspecified laterality, unspecified part of lung [J18.9]  Acute congestive heart failure, unspecified heart failure type (HCC) [I50.9]      has a past medical history of Achilles tendon rupture, Arthritis, Chronic kidney disease, Chronic pain, Diabetes (HCC), DM type 1 (diabetes mellitus, type 1) ([facility]), Endometriosis, Gastric ulcer, GERD (gastroesophageal reflux disease), Herpes, Other and unspecified hyperlipidemia, Panic attacks, Psychiatric disorder, PTSD (post-traumatic stress disorder), and Unspecified essential hypertension.    has a past surgical history that includes gyn; gyn; Colonoscopy; orthopedic surgery; orthopedic surgery (); pr unlisted procedure cardiac surgery; orthopedic surgery (Left, 2019); gyn; Cardiac procedure (N/A, 2023); invasive vascular (N/A, 2023); Cardiac procedure (N/A, 3/22/2024); and invasive vascular (N/A, 3/22/2024).   Prior to Admission Medications   Prescriptions Last Dose Informant Patient Reported? Taking?   ALPRAZolam (XANAX) 2 MG tablet  at 0700 Self Yes No   Sig: Take 1 tablet by mouth. 4 times a daily   Blood Glucose Monitoring Suppl (TRUE METRIX METER) PATRICIA   No No   Sig: Test 10 times daily Dx Code: E10.65   Cholecalciferol (VITAMIN D3) 125 MCG (5000 UT) TABS   Yes No   Sig: Take 1 tablet by mouth daily   Continuous Blood Gluc Sensor (FREESTYLE JOSE 3 SENSOR) MISC   Yes No   Sig: by Does not apply route every 14 days   DULoxetine (CYMBALTA) 20 MG extended release capsule   Yes No   Sig: Take 1 capsule by mouth daily   Insulin Pen Needle (B-D ULTRAFINE III SHORT PEN) 31G X 8 MM MISC   No No   Sig: Use to inject insulin QID   Insulin Syringe-Needle U-100 31G X 15/64\" 1 ML

## 2024-04-09 NOTE — CARE COORDINATION
Care Management Initial Assessment       RUR: 38% (high RUR, readmission)  Readmission? Yes - patient was last at [facility] from 3/20/2024 - 3/27/2024   1st IM letter given? Yes - 4/8/2024  1st  letter given: No    Patient [place]; Patient Access notified via email to verify this.     04/09/24 1147   Service Assessment   Patient Orientation Alert and Oriented   Cognition Alert   History Provided By Patient   Primary Caregiver Self   Accompanied By/Relationship N/A   Support Systems Spouse/Significant Other   Patient's Healthcare Decision Maker is: Named in Scanned ACP Document   PCP Verified by CM Yes   Last Visit to PCP Within last 3 months  (4/2/2024)   Prior Functional Level Independent in ADLs/IADLs   Current Functional Level Independent in ADLs/IADLs   Can patient return to prior living arrangement Yes   Ability to make needs known: Good   Family able to assist with home care needs: Yes   Would you like for me to discuss the discharge plan with any other family members/significant others, and if so, who? No   Financial Resources Medicare;Medicaid   Community Resources None   Social/Functional History   Lives With Significant other   Type of Home House   Home Layout One level   Entrance Stairs - Number of Steps 4   Home Equipment Oxygen  (2L O2 NC through [employer], tanks, concentrator and portable ? at home)   ADL Assistance Independent   Homemaking Assistance Independent   Ambulation Assistance Independent   Transfer Assistance Independent   Active  No  (Patient uses [employer] transportation)   Discharge Planning   Type of Residence House   Living Arrangements Spouse/Significant Other   Current Services Prior To Admission Oxygen Therapy   Potential Assistance Needed N/A   Potential DME Needed Oxygen Therapy (Comment)   DME Ordered? No   Potential Assistance Purchasing Medications No   Type of Home Care Services None   Patient expects to be discharged to: House   Services At/After

## 2024-04-09 NOTE — PLAN OF CARE
Problem: Pain  Goal: Verbalizes/displays adequate comfort level or baseline comfort level  4/9/2024 1127 by Jenn Zelaya RN  Outcome: Progressing  Flowsheets (Taken 4/9/2024 0726)  Verbalizes/displays adequate comfort level or baseline comfort level:   Encourage patient to monitor pain and request assistance   Assess pain using appropriate pain scale  4/8/2024 2157 by Argenis Bernstein RN  Outcome: Progressing     Problem: Respiratory - Adult  Goal: Achieves optimal ventilation and oxygenation  4/9/2024 1127 by Jenn Zelaya RN  Outcome: Progressing  4/9/2024 1012 by Josie Vanegas, RT  Outcome: Progressing  Flowsheets (Taken 4/9/2024 0726 by Jenn Zelaya RN)  Achieves optimal ventilation and oxygenation:   Assess for changes in respiratory status   Assess for changes in mentation and behavior     Problem: Safety - Adult  Goal: Free from fall injury  4/9/2024 1127 by Jenn Zelaya RN  Outcome: Progressing  Flowsheets (Taken 4/9/2024 0726)  Free From Fall Injury: Instruct family/caregiver on patient safety  4/8/2024 2157 by Argenis Bernstein RN  Outcome: Progressing

## 2024-04-10 LAB
ALBUMIN SERPL-MCNC: 2.9 G/DL (ref 3.5–5)
ANION GAP SERPL CALC-SCNC: 6 MMOL/L (ref 5–15)
BUN SERPL-MCNC: 33 MG/DL (ref 6–20)
BUN/CREAT SERPL: 24 (ref 12–20)
CALCIUM SERPL-MCNC: 8.3 MG/DL (ref 8.5–10.1)
CHLORIDE SERPL-SCNC: 96 MMOL/L (ref 97–108)
CO2 SERPL-SCNC: 33 MMOL/L (ref 21–32)
CREAT SERPL-MCNC: 1.38 MG/DL (ref 0.55–1.02)
GLUCOSE BLD STRIP.AUTO-MCNC: 106 MG/DL (ref 65–117)
GLUCOSE BLD STRIP.AUTO-MCNC: 139 MG/DL (ref 65–117)
GLUCOSE BLD STRIP.AUTO-MCNC: 206 MG/DL (ref 65–117)
GLUCOSE BLD STRIP.AUTO-MCNC: 241 MG/DL (ref 65–117)
GLUCOSE BLD STRIP.AUTO-MCNC: 75 MG/DL (ref 65–117)
GLUCOSE BLD STRIP.AUTO-MCNC: 92 MG/DL (ref 65–117)
GLUCOSE SERPL-MCNC: 222 MG/DL (ref 65–100)
PHOSPHATE SERPL-MCNC: 5.3 MG/DL (ref 2.6–4.7)
POTASSIUM SERPL-SCNC: 4.3 MMOL/L (ref 3.5–5.1)
SERVICE CMNT-IMP: ABNORMAL
SERVICE CMNT-IMP: NORMAL
SODIUM SERPL-SCNC: 135 MMOL/L (ref 136–145)

## 2024-04-10 PROCEDURE — 84100 ASSAY OF PHOSPHORUS: CPT

## 2024-04-10 PROCEDURE — 2580000003 HC RX 258: Performed by: INTERNAL MEDICINE

## 2024-04-10 PROCEDURE — 94640 AIRWAY INHALATION TREATMENT: CPT

## 2024-04-10 PROCEDURE — 6360000002 HC RX W HCPCS: Performed by: STUDENT IN AN ORGANIZED HEALTH CARE EDUCATION/TRAINING PROGRAM

## 2024-04-10 PROCEDURE — 82962 GLUCOSE BLOOD TEST: CPT

## 2024-04-10 PROCEDURE — 36415 COLL VENOUS BLD VENIPUNCTURE: CPT

## 2024-04-10 PROCEDURE — 2700000000 HC OXYGEN THERAPY PER DAY

## 2024-04-10 PROCEDURE — 6370000000 HC RX 637 (ALT 250 FOR IP): Performed by: STUDENT IN AN ORGANIZED HEALTH CARE EDUCATION/TRAINING PROGRAM

## 2024-04-10 PROCEDURE — 6360000002 HC RX W HCPCS: Performed by: INTERNAL MEDICINE

## 2024-04-10 PROCEDURE — 1100000003 HC PRIVATE W/ TELEMETRY

## 2024-04-10 PROCEDURE — 82040 ASSAY OF SERUM ALBUMIN: CPT

## 2024-04-10 PROCEDURE — 80048 BASIC METABOLIC PNL TOTAL CA: CPT

## 2024-04-10 PROCEDURE — 6370000000 HC RX 637 (ALT 250 FOR IP): Performed by: NURSE PRACTITIONER

## 2024-04-10 PROCEDURE — 6370000000 HC RX 637 (ALT 250 FOR IP): Performed by: INTERNAL MEDICINE

## 2024-04-10 PROCEDURE — 2580000003 HC RX 258: Performed by: STUDENT IN AN ORGANIZED HEALTH CARE EDUCATION/TRAINING PROGRAM

## 2024-04-10 RX ORDER — BUMETANIDE 0.25 MG/ML
2 INJECTION INTRAMUSCULAR; INTRAVENOUS ONCE
Status: COMPLETED | OUTPATIENT
Start: 2024-04-10 | End: 2024-04-10

## 2024-04-10 RX ADMIN — ALPRAZOLAM 2 MG: 0.5 TABLET ORAL at 22:22

## 2024-04-10 RX ADMIN — BUMETANIDE 2 MG: 0.25 INJECTION INTRAMUSCULAR; INTRAVENOUS at 18:05

## 2024-04-10 RX ADMIN — ACETAMINOPHEN 650 MG: 325 TABLET ORAL at 14:28

## 2024-04-10 RX ADMIN — OXYCODONE 5 MG: 5 TABLET ORAL at 15:57

## 2024-04-10 RX ADMIN — BUMETANIDE 2 MG: 0.25 INJECTION INTRAMUSCULAR; INTRAVENOUS at 12:26

## 2024-04-10 RX ADMIN — MORPHINE SULFATE 90 MG: 30 TABLET, FILM COATED, EXTENDED RELEASE ORAL at 08:28

## 2024-04-10 RX ADMIN — AZITHROMYCIN MONOHYDRATE 500 MG: 500 INJECTION, POWDER, LYOPHILIZED, FOR SOLUTION INTRAVENOUS at 02:15

## 2024-04-10 RX ADMIN — SODIUM CHLORIDE: 9 INJECTION, SOLUTION INTRAVENOUS at 01:24

## 2024-04-10 RX ADMIN — SODIUM CHLORIDE, PRESERVATIVE FREE 10 ML: 5 INJECTION INTRAVENOUS at 21:15

## 2024-04-10 RX ADMIN — BUMETANIDE 2 MG: 0.25 INJECTION INTRAMUSCULAR; INTRAVENOUS at 05:27

## 2024-04-10 RX ADMIN — ALPRAZOLAM 2 MG: 0.5 TABLET ORAL at 07:15

## 2024-04-10 RX ADMIN — ALPRAZOLAM 2 MG: 0.5 TABLET ORAL at 01:44

## 2024-04-10 RX ADMIN — INSULIN GLARGINE 15 UNITS: 100 INJECTION, SOLUTION SUBCUTANEOUS at 08:29

## 2024-04-10 RX ADMIN — PANTOPRAZOLE SODIUM 40 MG: 40 TABLET, DELAYED RELEASE ORAL at 05:23

## 2024-04-10 RX ADMIN — OXYCODONE 5 MG: 5 TABLET ORAL at 23:55

## 2024-04-10 RX ADMIN — INSULIN LISPRO 2 UNITS: 100 INJECTION, SOLUTION INTRAVENOUS; SUBCUTANEOUS at 12:25

## 2024-04-10 RX ADMIN — CITALOPRAM HYDROBROMIDE 40 MG: 20 TABLET ORAL at 08:29

## 2024-04-10 RX ADMIN — MORPHINE SULFATE 90 MG: 30 TABLET, FILM COATED, EXTENDED RELEASE ORAL at 21:14

## 2024-04-10 RX ADMIN — ENOXAPARIN SODIUM 40 MG: 100 INJECTION SUBCUTANEOUS at 08:29

## 2024-04-10 RX ADMIN — SODIUM CHLORIDE 1000 MG: 900 INJECTION INTRAVENOUS at 01:25

## 2024-04-10 RX ADMIN — OXYCODONE 5 MG: 5 TABLET ORAL at 05:22

## 2024-04-10 RX ADMIN — OXYCODONE 5 MG: 5 TABLET ORAL at 11:18

## 2024-04-10 RX ADMIN — BUSPIRONE HYDROCHLORIDE 15 MG: 10 TABLET ORAL at 08:28

## 2024-04-10 RX ADMIN — SODIUM CHLORIDE, PRESERVATIVE FREE 10 ML: 5 INJECTION INTRAVENOUS at 08:30

## 2024-04-10 RX ADMIN — BUSPIRONE HYDROCHLORIDE 15 MG: 10 TABLET ORAL at 14:27

## 2024-04-10 RX ADMIN — BUSPIRONE HYDROCHLORIDE 15 MG: 10 TABLET ORAL at 21:14

## 2024-04-10 RX ADMIN — ASPIRIN 81 MG: 81 TABLET, COATED ORAL at 08:28

## 2024-04-10 RX ADMIN — DULOXETINE HYDROCHLORIDE 20 MG: 20 CAPSULE, DELAYED RELEASE ORAL at 08:28

## 2024-04-10 RX ADMIN — TIOTROPIUM BROMIDE INHALATION SPRAY 2 PUFF: 3.12 SPRAY, METERED RESPIRATORY (INHALATION) at 07:56

## 2024-04-10 ASSESSMENT — PAIN DESCRIPTION - ORIENTATION
ORIENTATION: MID
ORIENTATION: RIGHT
ORIENTATION: RIGHT
ORIENTATION: MID

## 2024-04-10 ASSESSMENT — PAIN SCALES - GENERAL
PAINLEVEL_OUTOF10: 8
PAINLEVEL_OUTOF10: 6
PAINLEVEL_OUTOF10: 9
PAINLEVEL_OUTOF10: 8
PAINLEVEL_OUTOF10: 3

## 2024-04-10 ASSESSMENT — PAIN - FUNCTIONAL ASSESSMENT
PAIN_FUNCTIONAL_ASSESSMENT: ACTIVITIES ARE NOT PREVENTED

## 2024-04-10 ASSESSMENT — PAIN DESCRIPTION - DESCRIPTORS
DESCRIPTORS: ACHING;THROBBING
DESCRIPTORS: ACHING;THROBBING
DESCRIPTORS: CRAMPING

## 2024-04-10 ASSESSMENT — PAIN DESCRIPTION - LOCATION
LOCATION: KNEE
LOCATION: ABDOMEN;BACK
LOCATION: ABDOMEN
LOCATION: ABDOMEN;BACK
LOCATION: KNEE

## 2024-04-10 ASSESSMENT — PAIN DESCRIPTION - PAIN TYPE
TYPE: CHRONIC PAIN
TYPE: CHRONIC PAIN

## 2024-04-10 NOTE — PLAN OF CARE
Problem: Safety - Adult  Goal: Free from fall injury  Outcome: Progressing  Flowsheets (Taken 4/10/2024 0728)  Free From Fall Injury: Instruct family/caregiver on patient safety     Problem: Pain  Goal: Verbalizes/displays adequate comfort level or baseline comfort level  Outcome: Progressing  Flowsheets (Taken 4/10/2024 0728)  Verbalizes/displays adequate comfort level or baseline comfort level:   Encourage patient to monitor pain and request assistance   Assess pain using appropriate pain scale     Problem: Respiratory - Adult  Goal: Achieves optimal ventilation and oxygenation  Outcome: Progressing  Flowsheets (Taken 4/10/2024 0728)  Achieves optimal ventilation and oxygenation:   Assess for changes in respiratory status   Assess for changes in mentation and behavior     Problem: Respiratory - Adult  Goal: Achieves optimal ventilation and oxygenation  Outcome: Progressing  Flowsheets (Taken 4/10/2024 0728)  Achieves optimal ventilation and oxygenation:   Assess for changes in respiratory status   Assess for changes in mentation and behavior     Problem: Chronic Conditions and Co-morbidities  Goal: Patient's chronic conditions and co-morbidity symptoms are monitored and maintained or improved  Outcome: Progressing  Flowsheets (Taken 4/10/2024 0728)  Care Plan - Patient's Chronic Conditions and Co-Morbidity Symptoms are Monitored and Maintained or Improved: Monitor and assess patient's chronic conditions and comorbid symptoms for stability, deterioration, or improvement

## 2024-04-11 VITALS
SYSTOLIC BLOOD PRESSURE: 136 MMHG | HEART RATE: 67 BPM | HEIGHT: 65 IN | DIASTOLIC BLOOD PRESSURE: 75 MMHG | OXYGEN SATURATION: 95 % | RESPIRATION RATE: 16 BRPM | BODY MASS INDEX: 29.66 KG/M2 | TEMPERATURE: 98.5 F | WEIGHT: 178.02 LBS

## 2024-04-11 LAB
ANION GAP SERPL CALC-SCNC: 4 MMOL/L (ref 5–15)
BUN SERPL-MCNC: 36 MG/DL (ref 6–20)
BUN/CREAT SERPL: 25 (ref 12–20)
CALCIUM SERPL-MCNC: 8.9 MG/DL (ref 8.5–10.1)
CHLORIDE SERPL-SCNC: 96 MMOL/L (ref 97–108)
CO2 SERPL-SCNC: 36 MMOL/L (ref 21–32)
CREAT SERPL-MCNC: 1.42 MG/DL (ref 0.55–1.02)
GLUCOSE BLD STRIP.AUTO-MCNC: 101 MG/DL (ref 65–117)
GLUCOSE BLD STRIP.AUTO-MCNC: 127 MG/DL (ref 65–117)
GLUCOSE BLD STRIP.AUTO-MCNC: 156 MG/DL (ref 65–117)
GLUCOSE BLD STRIP.AUTO-MCNC: 306 MG/DL (ref 65–117)
GLUCOSE SERPL-MCNC: 178 MG/DL (ref 65–100)
POTASSIUM SERPL-SCNC: 4.1 MMOL/L (ref 3.5–5.1)
SERVICE CMNT-IMP: ABNORMAL
SERVICE CMNT-IMP: NORMAL
SODIUM SERPL-SCNC: 136 MMOL/L (ref 136–145)

## 2024-04-11 PROCEDURE — 80048 BASIC METABOLIC PNL TOTAL CA: CPT

## 2024-04-11 PROCEDURE — 6360000002 HC RX W HCPCS: Performed by: STUDENT IN AN ORGANIZED HEALTH CARE EDUCATION/TRAINING PROGRAM

## 2024-04-11 PROCEDURE — 2580000003 HC RX 258: Performed by: STUDENT IN AN ORGANIZED HEALTH CARE EDUCATION/TRAINING PROGRAM

## 2024-04-11 PROCEDURE — 36415 COLL VENOUS BLD VENIPUNCTURE: CPT

## 2024-04-11 PROCEDURE — 94640 AIRWAY INHALATION TREATMENT: CPT

## 2024-04-11 PROCEDURE — 82962 GLUCOSE BLOOD TEST: CPT

## 2024-04-11 PROCEDURE — 6360000002 HC RX W HCPCS: Performed by: INTERNAL MEDICINE

## 2024-04-11 PROCEDURE — 6370000000 HC RX 637 (ALT 250 FOR IP): Performed by: STUDENT IN AN ORGANIZED HEALTH CARE EDUCATION/TRAINING PROGRAM

## 2024-04-11 PROCEDURE — 6370000000 HC RX 637 (ALT 250 FOR IP): Performed by: NURSE PRACTITIONER

## 2024-04-11 RX ORDER — INSULIN GLARGINE 100 [IU]/ML
15 INJECTION, SOLUTION SUBCUTANEOUS DAILY
Qty: 10 ML | Refills: 1 | Status: SHIPPED
Start: 2024-04-11

## 2024-04-11 RX ADMIN — SODIUM CHLORIDE, PRESERVATIVE FREE 10 ML: 5 INJECTION INTRAVENOUS at 08:16

## 2024-04-11 RX ADMIN — SODIUM CHLORIDE 1000 MG: 900 INJECTION INTRAVENOUS at 00:01

## 2024-04-11 RX ADMIN — BUMETANIDE 2 MG: 0.25 INJECTION INTRAMUSCULAR; INTRAVENOUS at 04:27

## 2024-04-11 RX ADMIN — ALPRAZOLAM 2 MG: 0.5 TABLET ORAL at 07:03

## 2024-04-11 RX ADMIN — OXYCODONE 5 MG: 5 TABLET ORAL at 04:27

## 2024-04-11 RX ADMIN — PANTOPRAZOLE SODIUM 40 MG: 40 TABLET, DELAYED RELEASE ORAL at 04:33

## 2024-04-11 RX ADMIN — INSULIN LISPRO 6 UNITS: 100 INJECTION, SOLUTION INTRAVENOUS; SUBCUTANEOUS at 11:52

## 2024-04-11 RX ADMIN — DULOXETINE HYDROCHLORIDE 20 MG: 20 CAPSULE, DELAYED RELEASE ORAL at 08:15

## 2024-04-11 RX ADMIN — ASPIRIN 81 MG: 81 TABLET, COATED ORAL at 08:15

## 2024-04-11 RX ADMIN — CITALOPRAM HYDROBROMIDE 40 MG: 20 TABLET ORAL at 08:15

## 2024-04-11 RX ADMIN — ACETAMINOPHEN 650 MG: 325 TABLET ORAL at 02:52

## 2024-04-11 RX ADMIN — BUSPIRONE HYDROCHLORIDE 15 MG: 10 TABLET ORAL at 08:15

## 2024-04-11 RX ADMIN — MORPHINE SULFATE 90 MG: 30 TABLET, FILM COATED, EXTENDED RELEASE ORAL at 08:15

## 2024-04-11 RX ADMIN — ENOXAPARIN SODIUM 40 MG: 100 INJECTION SUBCUTANEOUS at 08:16

## 2024-04-11 RX ADMIN — TIOTROPIUM BROMIDE INHALATION SPRAY 2 PUFF: 3.12 SPRAY, METERED RESPIRATORY (INHALATION) at 09:05

## 2024-04-11 ASSESSMENT — PAIN DESCRIPTION - ORIENTATION
ORIENTATION: RIGHT
ORIENTATION: RIGHT

## 2024-04-11 ASSESSMENT — PAIN DESCRIPTION - DESCRIPTORS
DESCRIPTORS: ACHING;THROBBING
DESCRIPTORS: ACHING;THROBBING

## 2024-04-11 ASSESSMENT — PAIN DESCRIPTION - LOCATION
LOCATION: KNEE
LOCATION: KNEE;ABDOMEN

## 2024-04-11 ASSESSMENT — PAIN SCALES - GENERAL
PAINLEVEL_OUTOF10: 9
PAINLEVEL_OUTOF10: 8

## 2024-04-11 NOTE — CARE COORDINATION
Patient is clear from a CM standpoint.    Transition of Care Plan: Home with follow ups.    RUR: 38% (high RUR, readmission)  Prior Level of Functioning: Independent  Disposition: Home with follow ups.  If SNF or IPR: Date FOC offered: N/A  Date FOC received: N/A  Accepting facility: N/A  Date authorization started with reference number: N/A  Date authorization received and expires: N/A  Follow up appointments: PCP/specialists if needed.  DME needed: None.  Patient has 2L O2 NC at home through [employer].  Transportation at discharge: Family  IM/IMM Medicare/ letter given: 2nd IM done 4/11/2024.  Patient verbalized understanding and gave permission for possible discharge within 4 hours of receiving IMM.  Is patient a [place] and connected with [facility]? No.   If yes, was [place] transfer form completed and [facility] notified? N/A  Caregiver Contact: Duke Zaratefryue - [phone]  Discharge Caregiver contacted prior to discharge? Patient to contact.  Care Conference needed? No.  Barriers to discharge: None.    RN is notified that patient is clear from a CM standpoint.  SMAART tool left outside door.  Patient confirmed that she needs her medications returned to her on discharge but has no other concerns regarding having her medications at home.     04/11/24 1215   Services At/After Discharge   Transition of Care Consult (CM Consult) N/A   Services At/After Discharge None   Mode of Transport at Discharge Other (see comment)  (BF)   Confirm Follow Up Transport Family   Condition of Participation: Discharge Planning   The Plan for Transition of Care is related to the following treatment goals: Return home   The Patient and/or Patient Representative was provided with a Choice of Provider? Patient   The Patient and/Or Patient Representative agree with the Discharge Plan? Yes         STEVE Elder, RN    Care Management  [phone]

## 2024-04-11 NOTE — PLAN OF CARE
Problem: Safety - Adult  Goal: Free from fall injury  Outcome: Progressing     Problem: Discharge Planning  Goal: Discharge to home or other facility with appropriate resources  Outcome: Progressing  Flowsheets (Taken 4/11/2024 0715)  Discharge to home or other facility with appropriate resources:   Identify barriers to discharge with patient and caregiver   Arrange for needed discharge resources and transportation as appropriate     Problem: Pain  Goal: Verbalizes/displays adequate comfort level or baseline comfort level  Outcome: Progressing  Flowsheets (Taken 4/11/2024 0715)  Verbalizes/displays adequate comfort level or baseline comfort level:   Encourage patient to monitor pain and request assistance   Assess pain using appropriate pain scale

## 2024-04-11 NOTE — DISCHARGE SUMMARY
Misc     GlucaGen HypoKit 1 MG Solr injection  Generic drug: glucagon (rDNA)  USE AS DIRECTED IN KIT INSTRUCTION     Insulin Syringe-Needle U-100 31G X 15/64\" 1 ML Misc  Tyra to inject insulin QID     Magnesium 400 MG Tabs     metoclopramide 5 MG tablet  Commonly known as: REGLAN  TAKE ONE TABLET BY MOUTH TWICE A DAY     Mirena (52 MG) IUD 52 mg  Generic drug: levonorgestrel     Morphine Sulfate  MG T12a     Multivitamin Tabs     naloxone 4 MG/0.1ML Liqd nasal spray     NovoLOG 100 UNIT/ML injection vial  Generic drug: insulin aspart  Inject 6 units before meals + 1 units for every 50 mg/dl above 150 mg/dl--max 33 units/day--Dose change 12/12/23--updated med list--did not send prescription to the pharmacy     oxyCODONE 5 MG immediate release tablet  Commonly known as: ROXICODONE     RELION GLUCOSE TEST STRIPS strip  Generic drug: blood glucose test strips  TEST 10 TIMES DAILY DUE TO FLUCTUATING BLOOD SUGARS     Rexulti 1 MG Tabs tablet  Generic drug: brexpiprazole     rosuvastatin 40 MG tablet  Commonly known as: CRESTOR     Spiriva Respimat 2.5 MCG/ACT Aers inhaler  Generic drug: tiotropium     True Metrix Meter Liz  Test 10 times daily Dx Code: E10.65     Vitamin D3 125 MCG (5000 UT) Tabs tablet               Where to Get Your Medications        Information about where to get these medications is not yet available    Ask your nurse or doctor about these medications  insulin glargine 100 UNIT/ML injection vial             DISPOSITION:    Home with Family: x      Home with HH/PT/OT/RN:    SNF/LTC:    TARAN:    OTHER:            Code status: full  Recommended diet: cardiac diet, fluid restriction to 1500 ml/day  Recommended activity: activity as tolerated  Wound care: None      Follow up with:   PCP : Jair Vieira MD Kowalski, John P, MD  [address] [facility] [address] [phone]    Go on 4/15/2024  at 10:45 a.m. for your PCP hospital follow up.          Total time in minutes spent

## 2024-04-11 NOTE — PROGRESS NOTES
Physician Progress Note      PATIENT:               CLAUDE SPANGLER  CSN #:                  [id number]  :                       1970  ADMIT DATE:       2024 10:39 AM  DISCH DATE:  RESPONDING  PROVIDER #:        Jenny Rubio MD          QUERY TEXT:    Patient admitted, noted documentation of Acute Kidney Injury in H&P on 24.    In order to support the diagnosis of PERLA, please include additional clinical   indicators in your documentation.? Or please document if the diagnosis of PERLA   has been ruled out after further study.    The medical record reflects the following:  Risk Factors: 53 y/o  Hx CKD 3, noted baseline 1.2-1.3    Clinical Indicators:  H&P: PERLA on CKD: Creatinine almost at baseline  24 11:39 Creatinine: 1.41 (H)  24 01:13Creatinine: 1.50 (H)  04/10/24 03:52 Creatinine: 1.38 (H)    Treatment: Admit, Hospitalist consult, BMP Daily, Consult nephrology    Defined by Kidney Disease Improving Global Outcomes (KDIGO) clinical practice   guideline for acute kidney injury:  -Increase in SCr by greater than or equal to 0.3 mg/dl within 48 hours; or  -Increase or decrease in SCr to greater than or equal to 1.5 times baseline,   which is known or presumed to have occurred within the prior 7 days; or  -Urine volume < 0.5ml/kg/h for 6 hours.  Options provided:  -- Acute kidney injury evidenced by, Please document evidence as well as a   numerical baseline creatinine, if known.  -- Acute kidney injury ruled out after study  -- Other - I will add my own diagnosis  -- Disagree - Not applicable / Not valid  -- Disagree - Clinically unable to determine / Unknown  -- Refer to Clinical Documentation Reviewer    PROVIDER RESPONSE TEXT:    Acute kidney injury was ruled out after study.    Query created by: Rilee, Cheryle on 4/10/2024 2:36 PM      Electronically signed by:  Jenny Rubio MD 2024 7:53 AM          
0300. Pt requested bed alarm refusal form. Pt is alert and oriented x4. Pt has been using call bell appropriately. Benefits of bed alarm for decreased risk of falls reviewed with pt. Pt understands she still needs to call for assistance before getting out of bed and is agreeable to this. Alarm refusal signed. Charge RNBre made aware as well.  
At approximately 00:50, pt called out stating that her personal glucometer was reading 68. Pt stated she felt like she was going to crash if she didn't drink soon. Glucose rechecked with hospital glucometer was 75. Pt's skin warm and dry, no increase in tremors. Pt insisted she needed juice, so drank two containers of juice. Pt called RN back shortly and stated her blood glucose on her monitor had only come up to 85 . RN checked again with facility glucometer, and glucose reading was 106. Pt still not displaying hypoglycemia symptoms. Pt was eating peanut butter and ritz crackers. Pt educated on best practice for eating fast acting carbs when she feels blood glucose is low, and not adding in protein, until glucose has returned to acceptable level. Pt asking if I was going to hang a D10 drip, but this RN advised that D10 drip not necessary at this time. We will continue to monitor glucose scheduled and as needed.  
Nephrology Progress Note  [facility] / [place] Office  [address], [address]  [place] [address]  Phone - [phone]  Fax - [phone]                 Patient: Anette Cannon                     YOB: 1970        Date- 4/11/2024                                     Admit Date: 4/8/2024   CC: Follow up for chronic kidney disease, hyponatremia          IMPRESSION & PLAN:   Hyponatremia due to non compliance to fluid restriction + chf   Chronic kidney disease 3 A (baseline 1.2-1.3)  Fluid overload   PERLA  CHF--right-sided  DM  Htn  edema      PLAN-  Continue Bumex 2 mg twice daily  Fluid restriction 1500 ml.day  She has right-sided heart failure it will be challenging to get effective diuresis on her  Okay to discharge home, check renal panel in 1 week     Subjective:  Interval History:   She wants to go home  She reports her leg edema is much better  She reports she has lost 9 pounds weight in the last 24 hours  She is on room air  Objective:   Vitals:    04/10/24 2240 04/11/24 0245 04/11/24 0427 04/11/24 0715   BP:   129/67 (!) 116/99   Pulse:    59   Resp:    16   Temp:    98.2 °F (36.8 °C)   TempSrc:    Oral   SpO2: 94%   96%   Weight:  80.7 kg (178 lb 0.3 oz)     Height:          I/O last 3 completed shifts:  In: 2001 [P.O.:2001]  Out: 5150 [Urine:5150]  I/O this shift:  In: 240 [P.O.:240]  Out: 600 [Urine:600]      Physical exam:  GEN:  NAD  NECK:  Supple, no thyromegaly  RESP:  no  wheezing, decreased bs b/l  CVS- s1,s2,rrr  NEURO: non focal, normal speech  EXT: Edema +nt            Chart reviewed.         Pertinent Notes reviewed.     Data Review :  Lab Results   Component Value Date/Time     04/11/2024 02:54 AM    K 4.1 04/11/2024 02:54 AM    CL 96 04/11/2024 02:54 AM    CO2 36 04/11/2024 02:54 AM    BUN 36 04/11/2024 02:54 AM    CREATININE 1.42 04/11/2024 02:54 AM    GLUCOSE 178 04/11/2024 02:54 AM    GLUCOSE 154 
Nursing contacted Nocturnist/cross cover provider and notified patient asking for morphine and xanax states takes at home. No other concerns reported. Dayshift hospitalist note reviewed, already on oxy which was continued and per note to continue xanax PTA dose. VSS. Ordered xanax 2mg as takes at home, may continue oxy as already ordered prn. Patient denies any further complaints or concerns. No acute distress reported. Nursing to notify Hospitalist for further/continued concerns. Will remain available overnight for further concerns if nursing/patient needs. Will defer further evaluation/management to the day shift primary care team.    Update:  Nurse reported pt concern for withdrawal, states morphine 100mg ER bid, I d/w pharm who may substitute 90mg if available. Will order to start now and can be readdressed by dayshift.     Non-billable note.     
PCP hospital follow-up transitional care appointment has been scheduled with Dr. Vieira on 4/15/24 at 1045.  Pending patient discharge. Amy Crystal, Care Management Assistant   
Patient appeared to be taking own supply of medications per RN. ADELIA Herndon confiscated alprazolam 2 mg #55 tabs and oxycodone 5 mg #1 tab. Counted in front of patient and placed in C2 safe in central pharmacy. Placed note on MAR to return medications to patient before discharge  
Patient appears very drowsy.  Patient was asked if she had any medication in the room.  Patient initially denied, but then produced two bottles of medication from her green purse.  Primary nurse explained the hospital policy to patient in regards to home medication.  Patient was also educated on dangers of combining home medication while also taking hospital prescribed mediation at the same time.  Procedure was followed to account for patient medication and store in central pharmacy.  
Stopped by to do COPD education with patient.  Patient was sleeping and will try again.  
   MCV 91.9 04/09/2024     04/09/2024      Recent Labs     04/08/24  1139 04/09/24  0113 04/10/24  0352   * 136 135*   K 4.5 4.1 4.3   CL 91* 97 96*   CO2 37* 36* 33*   GLUCOSE 359* 91 222*   BUN 25* 26* 33*   CREATININE 1.41* 1.50* 1.38*   CALCIUM 9.1 8.2* 8.3*         Recent Labs     04/08/24  1139 04/09/24  0113   WBC 5.3 5.0   RBC 4.14 3.60*   HGB 11.6 10.5*   HCT 37.8 33.1*   MCV 91.3 91.9   MCH 28.0 29.2   MCHC 30.7 31.7   RDW 19.6* 19.5*    190   MPV 9.1 9.3       Lab Results   Component Value Date/Time    IRON 17 (L) 06/26/2023 05:13 PM    TIBC 345 06/26/2023 05:13 PM     No results found for: \"PTH\"  Lab Results   Component Value Date/Time    LABA1C 9.3 (H) 04/01/2024 12:44 PM     Lab Results   Component Value Date/Time    COLORU YELLOW/STRAW 04/08/2024 12:05 PM    CLARITYU CLOUDY (A) 04/25/2023 01:47 PM    GLUCOSEU 500 (A) 04/08/2024 12:05 PM    BILIRUBINUR Negative 04/08/2024 12:05 PM    BILIRUBINUR Negative 04/25/2023 01:47 PM    KETUA Negative 04/08/2024 12:05 PM    SPECGRAV 1.006 04/08/2024 12:05 PM    BLOODU Negative 04/08/2024 12:05 PM    PHUR 5.0 04/25/2023 01:47 PM    PROTEINU Negative 04/08/2024 12:05 PM    NITRU Negative 04/08/2024 12:05 PM    LEUKOCYTESUR SMALL (A) 04/08/2024 12:05 PM     US Results (most recent):  Medication list  reviewed  Current Facility-Administered Medications   Medication Dose Route Frequency    insulin glargine (LANTUS) injection vial 15 Units  15 Units SubCUTAneous Daily    Patient's medications in central pharmacy- please return to patient before discharge   Other Prior to discharge    bumetanide (BUMEX) injection 2 mg  2 mg IntraVENous Q12H    aspirin EC tablet 81 mg  81 mg Oral Daily    busPIRone (BUSPAR) tablet 15 mg  15 mg Oral TID    citalopram (CELEXA) tablet 40 mg  40 mg Oral Daily    DULoxetine (CYMBALTA) extended release capsule 20 mg  20 mg Oral Daily    pantoprazole (PROTONIX) tablet 40 mg  40 mg Oral QAM AC    tiotropium (SPIRIVA 
   sodium chloride flush 0.9 % injection 5-40 mL  5-40 mL IntraVENous PRN    0.9 % sodium chloride infusion   IntraVENous PRN    enoxaparin (LOVENOX) injection 40 mg  40 mg SubCUTAneous Daily    ondansetron (ZOFRAN-ODT) disintegrating tablet 4 mg  4 mg Oral Q8H PRN    Or    ondansetron (ZOFRAN) injection 4 mg  4 mg IntraVENous Q6H PRN    polyethylene glycol (GLYCOLAX) packet 17 g  17 g Oral Daily PRN    acetaminophen (TYLENOL) tablet 650 mg  650 mg Oral Q6H PRN    Or    acetaminophen (TYLENOL) suppository 650 mg  650 mg Rectal Q6H PRN    insulin lispro (HUMALOG) injection vial 0-8 Units  0-8 Units SubCUTAneous TID WC    insulin lispro (HUMALOG) injection vial 0-4 Units  0-4 Units SubCUTAneous Nightly    insulin glargine (LANTUS) injection vial 15 Units  15 Units SubCUTAneous Nightly    glucose chewable tablet 16 g  4 tablet Oral PRN    dextrose bolus 10% 125 mL  125 mL IntraVENous PRN    Or    dextrose bolus 10% 250 mL  250 mL IntraVENous PRN    glucagon injection 1 mg  1 mg SubCUTAneous PRN    dextrose 10 % infusion   IntraVENous Continuous PRN    oxyCODONE (ROXICODONE) immediate release tablet 5 mg  5 mg Oral Q4H PRN    ALPRAZolam (XANAX) tablet 2 mg  2 mg Oral 4x Daily PRN    morphine (MS CONTIN) extended release tablet 90 mg  90 mg Oral BID        Robert Bennett MD  4/9/2024                                                                                                                 
  ________________________________________________________________________  Total NON critical care TIME:  38 Minutes    Total CRITICAL CARE TIME Spent:   Minutes non procedure based      Comments   >50% of visit spent in counseling and coordination of care     ________________________________________________________________________  Jenny Rubio MD     Procedures: see electronic medical records for all procedures/Xrays and details which were not copied into this note but were reviewed prior to creation of Plan.      LABS:  I reviewed today's most current labs and imaging studies.  Pertinent labs include:  Recent Labs     04/08/24 1139 04/09/24 0113   WBC 5.3 5.0   HGB 11.6 10.5*   HCT 37.8 33.1*    190       Recent Labs     04/08/24  1139 04/09/24  0113 04/10/24  0352   * 136 135*   K 4.5 4.1 4.3   CL 91* 97 96*   CO2 37* 36* 33*   GLUCOSE 359* 91 222*   BUN 25* 26* 33*   CREATININE 1.41* 1.50* 1.38*   CALCIUM 9.1 8.2* 8.3*   MG 1.9  --   --    PHOS  --   --  5.3*   LABALBU 3.5  --  2.9*   BILITOT 0.5  --   --    AST 24  --   --    ALT 32  --   --          Signed: Jenny Rubio MD        
Minutes non procedure based      Comments   >50% of visit spent in counseling and coordination of care     ________________________________________________________________________  Aron Bennett MD     Procedures: see electronic medical records for all procedures/Xrays and details which were not copied into this note but were reviewed prior to creation of Plan.      LABS:  I reviewed today's most current labs and imaging studies.  Pertinent labs include:  Recent Labs     04/08/24  1139 04/09/24  0113   WBC 5.3 5.0   HGB 11.6 10.5*   HCT 37.8 33.1*    190     Recent Labs     04/08/24  1139 04/09/24  0113   * 136   K 4.5 4.1   CL 91* 97   CO2 37* 36*   GLUCOSE 359* 91   BUN 25* 26*   CREATININE 1.41* 1.50*   CALCIUM 9.1 8.2*   MG 1.9  --    LABALBU 3.5  --    BILITOT 0.5  --    AST 24  --    ALT 32  --        Signed: Aron Bennett MD

## 2024-04-17 ENCOUNTER — TELEPHONE (OUTPATIENT)
Age: 54
End: 2024-04-17

## 2024-04-17 NOTE — TELEPHONE ENCOUNTER
----- Message from Jovany Jimenes sent at 4/17/2024  8:49 AM EDT -----  Regarding: FW: rescheduling appt  She accepted the appointment below.    [facility]      ----- Message -----  From: Steven Schmitt MD  Sent: 4/16/2024   7:39 PM EDT  To: #  Subject: rescheduling appt                                See if she can come on Thurs 5/2/24 at 11:30am.  Thanks!

## 2024-04-24 ENCOUNTER — APPOINTMENT (OUTPATIENT)
Facility: HOSPITAL | Age: 54
End: 2024-04-24
Payer: MEDICARE

## 2024-04-24 ENCOUNTER — HOSPITAL ENCOUNTER (INPATIENT)
Facility: HOSPITAL | Age: 54
LOS: 1 days | Discharge: HOME | End: 2024-04-26
Attending: EMERGENCY MEDICINE | Admitting: STUDENT IN AN ORGANIZED HEALTH CARE EDUCATION/TRAINING PROGRAM
Payer: MEDICARE

## 2024-04-24 DIAGNOSIS — E87.1 HYPONATREMIA: ICD-10-CM

## 2024-04-24 DIAGNOSIS — E87.1 ACUTE HYPONATREMIA: Primary | ICD-10-CM

## 2024-04-24 DIAGNOSIS — I50.23 ACUTE ON CHRONIC SYSTOLIC CHF (CONGESTIVE HEART FAILURE) (HCC): ICD-10-CM

## 2024-04-24 DIAGNOSIS — R26.2 UNABLE TO AMBULATE: ICD-10-CM
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
## 2024-04-24 DIAGNOSIS — J81.0 ACUTE PULMONARY EDEMA (HCC): ICD-10-CM

## 2024-04-24 DIAGNOSIS — N30.00 ACUTE CYSTITIS WITHOUT HEMATURIA: ICD-10-CM

## 2024-04-24 DIAGNOSIS — E87.6 ACUTE HYPOKALEMIA: ICD-10-CM

## 2024-04-24 DIAGNOSIS — N17.9 AKI (ACUTE KIDNEY INJURY) (HCC): ICD-10-CM

## 2024-04-24 DIAGNOSIS — Z79.899 POLYPHARMACY: ICD-10-CM

## 2024-04-24 LAB
BASOPHILS # BLD: 0 K/UL (ref 0–0.1)
BASOPHILS NFR BLD: 0 % (ref 0–1)
DIFFERENTIAL METHOD BLD: ABNORMAL
EOSINOPHIL # BLD: 0.1 K/UL (ref 0–0.4)
EOSINOPHIL NFR BLD: 2 % (ref 0–7)
ERYTHROCYTE [DISTWIDTH] IN BLOOD BY AUTOMATED COUNT: 17.6 % (ref 11.5–14.5)
GLUCOSE BLD STRIP.AUTO-MCNC: 126 MG/DL (ref 65–117)
HCT VFR BLD AUTO: 31.5 % (ref 35–47)
HGB BLD-MCNC: 10.2 G/DL (ref 11.5–16)
IMM GRANULOCYTES # BLD AUTO: 0 K/UL (ref 0–0.04)
IMM GRANULOCYTES NFR BLD AUTO: 0 % (ref 0–0.5)
LYMPHOCYTES # BLD: 2.7 K/UL (ref 0.8–3.5)
LYMPHOCYTES NFR BLD: 35 % (ref 12–49)
MCH RBC QN AUTO: 28.7 PG (ref 26–34)
MCHC RBC AUTO-ENTMCNC: 32.4 G/DL (ref 30–36.5)
MCV RBC AUTO: 88.7 FL (ref 80–99)
MONOCYTES # BLD: 0.9 K/UL (ref 0–1)
MONOCYTES NFR BLD: 11 % (ref 5–13)
NEUTS SEG # BLD: 3.9 K/UL (ref 1.8–8)
NEUTS SEG NFR BLD: 52 % (ref 32–75)
NRBC # BLD: 0 K/UL (ref 0–0.01)
NRBC BLD-RTO: 0 PER 100 WBC
PLATELET # BLD AUTO: 283 K/UL (ref 150–400)
PMV BLD AUTO: 9.5 FL (ref 8.9–12.9)
RBC # BLD AUTO: 3.55 M/UL (ref 3.8–5.2)
SERVICE CMNT-IMP: ABNORMAL
WBC # BLD AUTO: 7.6 K/UL (ref 3.6–11)

## 2024-04-24 PROCEDURE — 70450 CT HEAD/BRAIN W/O DYE: CPT

## 2024-04-24 PROCEDURE — 85025 COMPLETE CBC W/AUTO DIFF WBC: CPT

## 2024-04-24 PROCEDURE — 36415 COLL VENOUS BLD VENIPUNCTURE: CPT

## 2024-04-24 PROCEDURE — 72125 CT NECK SPINE W/O DYE: CPT

## 2024-04-24 PROCEDURE — 93005 ELECTROCARDIOGRAM TRACING: CPT | Performed by: EMERGENCY MEDICINE

## 2024-04-24 PROCEDURE — 87086 URINE CULTURE/COLONY COUNT: CPT

## 2024-04-24 PROCEDURE — 80053 COMPREHEN METABOLIC PANEL: CPT

## 2024-04-24 PROCEDURE — 80307 DRUG TEST PRSMV CHEM ANLYZR: CPT

## 2024-04-24 PROCEDURE — 81001 URINALYSIS AUTO W/SCOPE: CPT

## 2024-04-24 PROCEDURE — 82962 GLUCOSE BLOOD TEST: CPT

## 2024-04-24 PROCEDURE — 99285 EMERGENCY DEPT VISIT HI MDM: CPT

## 2024-04-24 PROCEDURE — 83880 ASSAY OF NATRIURETIC PEPTIDE: CPT

## 2024-04-24 PROCEDURE — 84484 ASSAY OF TROPONIN QUANT: CPT

## 2024-04-24 PROCEDURE — 83735 ASSAY OF MAGNESIUM: CPT

## 2024-04-24 ASSESSMENT — PAIN SCALES - GENERAL: PAINLEVEL_OUTOF10: 6

## 2024-04-25 ENCOUNTER — APPOINTMENT (OUTPATIENT)
Facility: HOSPITAL | Age: 54
End: 2024-04-25
Payer: MEDICARE

## 2024-04-25 LAB
ALBUMIN SERPL-MCNC: 3.3 G/DL (ref 3.5–5)
ALBUMIN/GLOB SERPL: 1.1 (ref 1.1–2.2)
ALP SERPL-CCNC: 80 U/L (ref 45–117)
ALT SERPL-CCNC: 20 U/L (ref 12–78)
AMPHET UR QL SCN: NEGATIVE
ANION GAP SERPL CALC-SCNC: 3 MMOL/L (ref 5–15)
ANION GAP SERPL CALC-SCNC: 4 MMOL/L (ref 5–15)
APPEARANCE UR: CLEAR
AST SERPL-CCNC: 18 U/L (ref 15–37)
BACTERIA URNS QL MICRO: ABNORMAL /HPF
BARBITURATES UR QL SCN: POSITIVE
BENZODIAZ UR QL: POSITIVE
BILIRUB SERPL-MCNC: 0.5 MG/DL (ref 0.2–1)
BILIRUB UR QL: NEGATIVE
BUN SERPL-MCNC: 20 MG/DL (ref 6–20)
BUN SERPL-MCNC: 22 MG/DL (ref 6–20)
BUN/CREAT SERPL: 11 (ref 12–20)
BUN/CREAT SERPL: 13 (ref 12–20)
CALCIUM SERPL-MCNC: 8.1 MG/DL (ref 8.5–10.1)
CALCIUM SERPL-MCNC: 8.2 MG/DL (ref 8.5–10.1)
CANNABINOIDS UR QL SCN: NEGATIVE
CHLORIDE SERPL-SCNC: 89 MMOL/L (ref 97–108)
CHLORIDE SERPL-SCNC: 90 MMOL/L (ref 97–108)
CO2 SERPL-SCNC: 32 MMOL/L (ref 21–32)
CO2 SERPL-SCNC: 35 MMOL/L (ref 21–32)
COCAINE UR QL SCN: NEGATIVE
COLOR UR: ABNORMAL
COMMENT:: NORMAL
CREAT SERPL-MCNC: 1.53 MG/DL (ref 0.55–1.02)
CREAT SERPL-MCNC: 1.94 MG/DL (ref 0.55–1.02)
EKG ATRIAL RATE: 80 BPM
EKG DIAGNOSIS: NORMAL
EKG P AXIS: 63 DEGREES
EKG P-R INTERVAL: 168 MS
EKG Q-T INTERVAL: 404 MS
EKG QRS DURATION: 94 MS
EKG QTC CALCULATION (BAZETT): 465 MS
EKG R AXIS: 79 DEGREES
EKG T AXIS: 61 DEGREES
EKG VENTRICULAR RATE: 80 BPM
EPITH CASTS URNS QL MICRO: ABNORMAL /LPF
GLOBULIN SER CALC-MCNC: 3.1 G/DL (ref 2–4)
GLUCOSE BLD STRIP.AUTO-MCNC: 117 MG/DL (ref 65–117)
GLUCOSE BLD STRIP.AUTO-MCNC: 120 MG/DL (ref 65–117)
GLUCOSE BLD STRIP.AUTO-MCNC: 231 MG/DL (ref 65–117)
GLUCOSE BLD STRIP.AUTO-MCNC: 248 MG/DL (ref 65–117)
GLUCOSE BLD STRIP.AUTO-MCNC: 354 MG/DL (ref 65–117)
GLUCOSE SERPL-MCNC: 119 MG/DL (ref 65–100)
GLUCOSE SERPL-MCNC: 219 MG/DL (ref 65–100)
GLUCOSE UR STRIP.AUTO-MCNC: NEGATIVE MG/DL
HGB UR QL STRIP: NEGATIVE
KETONES UR QL STRIP.AUTO: NEGATIVE MG/DL
LEUKOCYTE ESTERASE UR QL STRIP.AUTO: ABNORMAL
Lab: ABNORMAL
MAGNESIUM SERPL-MCNC: 1.6 MG/DL (ref 1.6–2.4)
METHADONE UR QL: NEGATIVE
NITRITE UR QL STRIP.AUTO: NEGATIVE
NT PRO BNP: 640 PG/ML
OPIATES UR QL: POSITIVE
PCP UR QL: NEGATIVE
PH UR STRIP: 5 (ref 5–8)
POTASSIUM SERPL-SCNC: 3.3 MMOL/L (ref 3.5–5.1)
POTASSIUM SERPL-SCNC: 3.8 MMOL/L (ref 3.5–5.1)
PROT SERPL-MCNC: 6.4 G/DL (ref 6.4–8.2)
PROT UR STRIP-MCNC: NEGATIVE MG/DL
RBC #/AREA URNS HPF: ABNORMAL /HPF (ref 0–5)
SERVICE CMNT-IMP: ABNORMAL
SERVICE CMNT-IMP: NORMAL
SODIUM SERPL-SCNC: 126 MMOL/L (ref 136–145)
SODIUM SERPL-SCNC: 127 MMOL/L (ref 136–145)
SP GR UR REFRACTOMETRY: 1.01
SPECIMEN HOLD: NORMAL
TROPONIN I SERPL HS-MCNC: 10 NG/L (ref 0–51)
URINE CULTURE IF INDICATED: ABNORMAL
UROBILINOGEN UR QL STRIP.AUTO: 0.2 EU/DL (ref 0.2–1)
WBC URNS QL MICRO: ABNORMAL /HPF (ref 0–4)

## 2024-04-25 PROCEDURE — 1100000003 HC PRIVATE W/ TELEMETRY

## 2024-04-25 PROCEDURE — 6360000002 HC RX W HCPCS: Performed by: GENERAL ACUTE CARE HOSPITAL

## 2024-04-25 PROCEDURE — 2700000000 HC OXYGEN THERAPY PER DAY

## 2024-04-25 PROCEDURE — 82962 GLUCOSE BLOOD TEST: CPT

## 2024-04-25 PROCEDURE — 36415 COLL VENOUS BLD VENIPUNCTURE: CPT

## 2024-04-25 PROCEDURE — 96365 THER/PROPH/DIAG IV INF INIT: CPT

## 2024-04-25 PROCEDURE — 6370000000 HC RX 637 (ALT 250 FOR IP): Performed by: STUDENT IN AN ORGANIZED HEALTH CARE EDUCATION/TRAINING PROGRAM

## 2024-04-25 PROCEDURE — 6360000002 HC RX W HCPCS: Performed by: INTERNAL MEDICINE

## 2024-04-25 PROCEDURE — 2580000003 HC RX 258: Performed by: STUDENT IN AN ORGANIZED HEALTH CARE EDUCATION/TRAINING PROGRAM

## 2024-04-25 PROCEDURE — 96368 THER/DIAG CONCURRENT INF: CPT

## 2024-04-25 PROCEDURE — 94640 AIRWAY INHALATION TREATMENT: CPT

## 2024-04-25 PROCEDURE — 94761 N-INVAS EAR/PLS OXIMETRY MLT: CPT

## 2024-04-25 PROCEDURE — P9047 ALBUMIN (HUMAN), 25%, 50ML: HCPCS | Performed by: INTERNAL MEDICINE

## 2024-04-25 PROCEDURE — 6360000002 HC RX W HCPCS: Performed by: STUDENT IN AN ORGANIZED HEALTH CARE EDUCATION/TRAINING PROGRAM

## 2024-04-25 PROCEDURE — 6360000002 HC RX W HCPCS: Performed by: EMERGENCY MEDICINE

## 2024-04-25 PROCEDURE — 96366 THER/PROPH/DIAG IV INF ADDON: CPT

## 2024-04-25 PROCEDURE — 96375 TX/PRO/DX INJ NEW DRUG ADDON: CPT

## 2024-04-25 PROCEDURE — 2580000003 HC RX 258: Performed by: EMERGENCY MEDICINE

## 2024-04-25 PROCEDURE — 71045 X-RAY EXAM CHEST 1 VIEW: CPT

## 2024-04-25 PROCEDURE — 80048 BASIC METABOLIC PNL TOTAL CA: CPT

## 2024-04-25 RX ORDER — ARFORMOTEROL TARTRATE 15 UG/2ML
15 SOLUTION RESPIRATORY (INHALATION)
Status: DISCONTINUED | OUTPATIENT
Start: 2024-04-25 | End: 2024-04-26 | Stop reason: HOSPADM

## 2024-04-25 RX ORDER — ONDANSETRON 2 MG/ML
4 INJECTION INTRAMUSCULAR; INTRAVENOUS EVERY 6 HOURS PRN
Status: DISCONTINUED | OUTPATIENT
Start: 2024-04-25 | End: 2024-04-26 | Stop reason: HOSPADM

## 2024-04-25 RX ORDER — DULOXETIN HYDROCHLORIDE 20 MG/1
20 CAPSULE, DELAYED RELEASE ORAL DAILY
Status: DISCONTINUED | OUTPATIENT
Start: 2024-04-25 | End: 2024-04-26 | Stop reason: HOSPADM

## 2024-04-25 RX ORDER — ENOXAPARIN SODIUM 100 MG/ML
40 INJECTION SUBCUTANEOUS DAILY
Status: DISCONTINUED | OUTPATIENT
Start: 2024-04-25 | End: 2024-04-26 | Stop reason: HOSPADM

## 2024-04-25 RX ORDER — ONDANSETRON 4 MG/1
4 TABLET, ORALLY DISINTEGRATING ORAL EVERY 8 HOURS PRN
Status: DISCONTINUED | OUTPATIENT
Start: 2024-04-25 | End: 2024-04-26 | Stop reason: HOSPADM

## 2024-04-25 RX ORDER — SODIUM CHLORIDE 0.9 % (FLUSH) 0.9 %
5-40 SYRINGE (ML) INJECTION EVERY 12 HOURS SCHEDULED
Status: DISCONTINUED | OUTPATIENT
Start: 2024-04-25 | End: 2024-04-26 | Stop reason: HOSPADM

## 2024-04-25 RX ORDER — POLYETHYLENE GLYCOL 3350 17 G/17G
17 POWDER, FOR SOLUTION ORAL DAILY PRN
Status: DISCONTINUED | OUTPATIENT
Start: 2024-04-25 | End: 2024-04-26 | Stop reason: HOSPADM

## 2024-04-25 RX ORDER — ACETAMINOPHEN 650 MG/1
650 SUPPOSITORY RECTAL EVERY 6 HOURS PRN
Status: DISCONTINUED | OUTPATIENT
Start: 2024-04-25 | End: 2024-04-26 | Stop reason: HOSPADM

## 2024-04-25 RX ORDER — SODIUM CHLORIDE 9 MG/ML
INJECTION, SOLUTION INTRAVENOUS PRN
Status: DISCONTINUED | OUTPATIENT
Start: 2024-04-25 | End: 2024-04-26 | Stop reason: HOSPADM

## 2024-04-25 RX ORDER — BUMETANIDE 1 MG/1
2 TABLET ORAL 2 TIMES DAILY
Status: DISCONTINUED | OUTPATIENT
Start: 2024-04-25 | End: 2024-04-26 | Stop reason: HOSPADM

## 2024-04-25 RX ORDER — INSULIN GLARGINE 100 [IU]/ML
10 INJECTION, SOLUTION SUBCUTANEOUS DAILY
Status: DISCONTINUED | OUTPATIENT
Start: 2024-04-25 | End: 2024-04-26 | Stop reason: HOSPADM

## 2024-04-25 RX ORDER — OXYCODONE HYDROCHLORIDE 5 MG/1
5 TABLET ORAL EVERY 4 HOURS PRN
Status: DISCONTINUED | OUTPATIENT
Start: 2024-04-25 | End: 2024-04-26 | Stop reason: HOSPADM

## 2024-04-25 RX ORDER — ACETAMINOPHEN 325 MG/1
650 TABLET ORAL EVERY 6 HOURS PRN
Status: DISCONTINUED | OUTPATIENT
Start: 2024-04-25 | End: 2024-04-26 | Stop reason: HOSPADM

## 2024-04-25 RX ORDER — BUMETANIDE 0.25 MG/ML
2 INJECTION INTRAMUSCULAR; INTRAVENOUS ONCE
Status: DISCONTINUED | OUTPATIENT
Start: 2024-04-25 | End: 2024-04-25

## 2024-04-25 RX ORDER — ARFORMOTEROL TARTRATE 15 UG/2ML
15 SOLUTION RESPIRATORY (INHALATION)
Status: DISCONTINUED | OUTPATIENT
Start: 2024-04-25 | End: 2024-04-25

## 2024-04-25 RX ORDER — SODIUM CHLORIDE 0.9 % (FLUSH) 0.9 %
5-40 SYRINGE (ML) INJECTION PRN
Status: DISCONTINUED | OUTPATIENT
Start: 2024-04-25 | End: 2024-04-26 | Stop reason: HOSPADM

## 2024-04-25 RX ORDER — PANTOPRAZOLE SODIUM 40 MG/1
40 TABLET, DELAYED RELEASE ORAL
Status: DISCONTINUED | OUTPATIENT
Start: 2024-04-25 | End: 2024-04-25 | Stop reason: SDUPTHER

## 2024-04-25 RX ORDER — POTASSIUM CHLORIDE 7.45 MG/ML
10 INJECTION INTRAVENOUS ONCE
Status: COMPLETED | OUTPATIENT
Start: 2024-04-25 | End: 2024-04-25

## 2024-04-25 RX ORDER — BUMETANIDE 0.25 MG/ML
2 INJECTION INTRAMUSCULAR; INTRAVENOUS 2 TIMES DAILY
Status: DISCONTINUED | OUTPATIENT
Start: 2024-04-25 | End: 2024-04-26

## 2024-04-25 RX ORDER — ALPRAZOLAM 0.5 MG/1
1 TABLET ORAL 4 TIMES DAILY PRN
Status: DISCONTINUED | OUTPATIENT
Start: 2024-04-25 | End: 2024-04-26 | Stop reason: HOSPADM

## 2024-04-25 RX ORDER — METOCLOPRAMIDE 10 MG/1
5 TABLET ORAL 2 TIMES DAILY
Status: DISCONTINUED | OUTPATIENT
Start: 2024-04-25 | End: 2024-04-25

## 2024-04-25 RX ORDER — INSULIN LISPRO 100 [IU]/ML
0-8 INJECTION, SOLUTION INTRAVENOUS; SUBCUTANEOUS
Status: DISCONTINUED | OUTPATIENT
Start: 2024-04-25 | End: 2024-04-26 | Stop reason: HOSPADM

## 2024-04-25 RX ORDER — ROSUVASTATIN CALCIUM 20 MG/1
40 TABLET, COATED ORAL DAILY
Status: DISCONTINUED | OUTPATIENT
Start: 2024-04-25 | End: 2024-04-26 | Stop reason: HOSPADM

## 2024-04-25 RX ORDER — MORPHINE SULFATE 100 MG/1
100 TABLET ORAL 2 TIMES DAILY
Status: DISCONTINUED | OUTPATIENT
Start: 2024-04-25 | End: 2024-04-26

## 2024-04-25 RX ORDER — ASPIRIN 81 MG/1
81 TABLET ORAL DAILY
Status: DISCONTINUED | OUTPATIENT
Start: 2024-04-25 | End: 2024-04-26 | Stop reason: HOSPADM

## 2024-04-25 RX ORDER — BUMETANIDE 0.25 MG/ML
1 INJECTION INTRAMUSCULAR; INTRAVENOUS ONCE
Status: COMPLETED | OUTPATIENT
Start: 2024-04-25 | End: 2024-04-25

## 2024-04-25 RX ORDER — CITALOPRAM 20 MG/1
40 TABLET ORAL DAILY
Status: DISCONTINUED | OUTPATIENT
Start: 2024-04-25 | End: 2024-04-26 | Stop reason: HOSPADM

## 2024-04-25 RX ORDER — BUMETANIDE 1 MG/1
4 TABLET ORAL 2 TIMES DAILY
Status: DISCONTINUED | OUTPATIENT
Start: 2024-04-25 | End: 2024-04-25

## 2024-04-25 RX ORDER — DEXTROSE MONOHYDRATE 100 MG/ML
INJECTION, SOLUTION INTRAVENOUS CONTINUOUS PRN
Status: DISCONTINUED | OUTPATIENT
Start: 2024-04-25 | End: 2024-04-26 | Stop reason: HOSPADM

## 2024-04-25 RX ORDER — GLUCAGON 1 MG/ML
1 KIT INJECTION PRN
Status: DISCONTINUED | OUTPATIENT
Start: 2024-04-25 | End: 2024-04-26 | Stop reason: HOSPADM

## 2024-04-25 RX ORDER — ALBUMIN (HUMAN) 12.5 G/50ML
25 SOLUTION INTRAVENOUS ONCE
Status: COMPLETED | OUTPATIENT
Start: 2024-04-25 | End: 2024-04-25

## 2024-04-25 RX ORDER — INSULIN LISPRO 100 [IU]/ML
0-4 INJECTION, SOLUTION INTRAVENOUS; SUBCUTANEOUS NIGHTLY
Status: DISCONTINUED | OUTPATIENT
Start: 2024-04-25 | End: 2024-04-26 | Stop reason: HOSPADM

## 2024-04-25 RX ADMIN — IPRATROPIUM BROMIDE 0.5 MG: 0.5 SOLUTION RESPIRATORY (INHALATION) at 08:47

## 2024-04-25 RX ADMIN — SODIUM CHLORIDE: 9 INJECTION, SOLUTION INTRAVENOUS at 14:04

## 2024-04-25 RX ADMIN — ARFORMOTEROL TARTRATE 15 MCG: 15 SOLUTION RESPIRATORY (INHALATION) at 08:52

## 2024-04-25 RX ADMIN — IPRATROPIUM BROMIDE 0.5 MG: 0.5 SOLUTION RESPIRATORY (INHALATION) at 13:20

## 2024-04-25 RX ADMIN — INSULIN LISPRO 2 UNITS: 100 INJECTION, SOLUTION INTRAVENOUS; SUBCUTANEOUS at 13:58

## 2024-04-25 RX ADMIN — BUSPIRONE HYDROCHLORIDE 15 MG: 5 TABLET ORAL at 21:01

## 2024-04-25 RX ADMIN — ALPRAZOLAM 1 MG: 0.5 TABLET ORAL at 17:07

## 2024-04-25 RX ADMIN — BUMETANIDE 2 MG: 0.25 INJECTION INTRAMUSCULAR; INTRAVENOUS at 20:56

## 2024-04-25 RX ADMIN — ENOXAPARIN SODIUM 40 MG: 100 INJECTION SUBCUTANEOUS at 10:37

## 2024-04-25 RX ADMIN — SODIUM CHLORIDE, PRESERVATIVE FREE 10 ML: 5 INJECTION INTRAVENOUS at 21:07

## 2024-04-25 RX ADMIN — CITALOPRAM HYDROBROMIDE 40 MG: 20 TABLET ORAL at 10:34

## 2024-04-25 RX ADMIN — SODIUM CHLORIDE 1000 MG: 900 INJECTION INTRAVENOUS at 03:03

## 2024-04-25 RX ADMIN — ASPIRIN 81 MG: 81 TABLET, COATED ORAL at 10:37

## 2024-04-25 RX ADMIN — BREXPIPRAZOLE 2 MG: 1 TABLET ORAL at 10:34

## 2024-04-25 RX ADMIN — INSULIN GLARGINE 10 UNITS: 100 INJECTION, SOLUTION SUBCUTANEOUS at 10:38

## 2024-04-25 RX ADMIN — SODIUM CHLORIDE, PRESERVATIVE FREE 10 ML: 5 INJECTION INTRAVENOUS at 10:37

## 2024-04-25 RX ADMIN — IPRATROPIUM BROMIDE 0.5 MG: 0.5 SOLUTION RESPIRATORY (INHALATION) at 20:11

## 2024-04-25 RX ADMIN — INSULIN LISPRO 2 UNITS: 100 INJECTION, SOLUTION INTRAVENOUS; SUBCUTANEOUS at 18:15

## 2024-04-25 RX ADMIN — OXYCODONE 5 MG: 5 TABLET ORAL at 21:05

## 2024-04-25 RX ADMIN — ALBUMIN (HUMAN) 25 G: 0.25 INJECTION, SOLUTION INTRAVENOUS at 14:04

## 2024-04-25 RX ADMIN — BUMETANIDE 1 MG: 0.25 INJECTION INTRAMUSCULAR; INTRAVENOUS at 03:00

## 2024-04-25 RX ADMIN — BUSPIRONE HYDROCHLORIDE 15 MG: 5 TABLET ORAL at 10:35

## 2024-04-25 RX ADMIN — ALPRAZOLAM 1 MG: 0.5 TABLET ORAL at 23:27

## 2024-04-25 RX ADMIN — DULOXETINE HYDROCHLORIDE 20 MG: 20 CAPSULE, DELAYED RELEASE ORAL at 10:34

## 2024-04-25 RX ADMIN — POTASSIUM CHLORIDE 10 MEQ: 7.46 INJECTION, SOLUTION INTRAVENOUS at 03:03

## 2024-04-25 RX ADMIN — INSULIN LISPRO 4 UNITS: 100 INJECTION, SOLUTION INTRAVENOUS; SUBCUTANEOUS at 22:34

## 2024-04-25 RX ADMIN — OXYCODONE 5 MG: 5 TABLET ORAL at 16:36

## 2024-04-25 RX ADMIN — POLYETHYLENE GLYCOL 3350 17 G: 17 POWDER, FOR SOLUTION ORAL at 16:36

## 2024-04-25 RX ADMIN — ARFORMOTEROL TARTRATE 15 MCG: 15 SOLUTION RESPIRATORY (INHALATION) at 20:11

## 2024-04-25 RX ADMIN — BUMETANIDE 2 MG: 0.25 INJECTION INTRAMUSCULAR; INTRAVENOUS at 13:57

## 2024-04-25 RX ADMIN — BUSPIRONE HYDROCHLORIDE 15 MG: 5 TABLET ORAL at 15:51

## 2024-04-25 RX ADMIN — ROSUVASTATIN 40 MG: 20 TABLET, FILM COATED ORAL at 10:36

## 2024-04-25 ASSESSMENT — PAIN DESCRIPTION - ORIENTATION
ORIENTATION: RIGHT;LEFT
ORIENTATION: LOWER

## 2024-04-25 ASSESSMENT — PAIN DESCRIPTION - ONSET
ONSET: ON-GOING
ONSET: ON-GOING

## 2024-04-25 ASSESSMENT — PAIN DESCRIPTION - LOCATION
LOCATION: ABDOMEN;KNEE
LOCATION: ABDOMEN
LOCATION: OTHER (COMMENT)
LOCATION: ABDOMEN;KNEE
LOCATION: ABDOMEN;KNEE

## 2024-04-25 ASSESSMENT — PAIN SCALES - WONG BAKER: WONGBAKER_NUMERICALRESPONSE: NO HURT

## 2024-04-25 ASSESSMENT — PAIN DESCRIPTION - FREQUENCY
FREQUENCY: CONTINUOUS
FREQUENCY: CONTINUOUS

## 2024-04-25 ASSESSMENT — PAIN DESCRIPTION - PAIN TYPE
TYPE: ACUTE PAIN;CHRONIC PAIN
TYPE: ACUTE PAIN;CHRONIC PAIN

## 2024-04-25 ASSESSMENT — PAIN SCALES - GENERAL
PAINLEVEL_OUTOF10: 6
PAINLEVEL_OUTOF10: 8
PAINLEVEL_OUTOF10: 9

## 2024-04-25 ASSESSMENT — PAIN DESCRIPTION - DESCRIPTORS
DESCRIPTORS: ACHING
DESCRIPTORS: ACHING
DESCRIPTORS: ACHING;PRESSURE;SHARP
DESCRIPTORS: STABBING;ACHING

## 2024-04-25 NOTE — CARE COORDINATION
not eligible/does not have active current Medicaid coverage)   Social/Functional History   Lives With Significant other   Type of Home House   Home Layout One level   Bathroom Toilet Standard   Bathroom Equipment None   Home Equipment Oxygen  (2L O2 at night, supplied by [employer])   Receives Help From Family   ADL Assistance Independent   Homemaking Assistance Independent   Ambulation Assistance Independent   Transfer Assistance Independent   Active  No   Patient's  Info Father or boyfriend provides transportation needs   Mode of Transportation Car   Occupation On disability   Discharge Planning   Type of Residence House   Living Arrangements Spouse/Significant Other   Current Services Prior To Admission Oxygen Therapy   Potential Assistance Needed N/A   DME Ordered? No   Potential Assistance Purchasing Medications No   Patient expects to be discharged to: [place]        04/25/24 1231   Readmission Assessment   Number of Days since last admission? 8-30 days   Previous Disposition Home with Family   Who is being Interviewed Patient   What was the patient's/caregiver's perception as to why they think they needed to return back to the hospital? Other (Comment)  (Reports swelling in her legs prompted her return to hospital)   Did you visit your Primary Care Physician after you left the hospital, before you returned this time? Yes  (Seen on 4/17/24)   Did you see a specialist, such as Cardiac, Pulmonary, Orthopedic Physician, etc. after you left the hospital? No   Who advised the patient to return to the hospital? Self-referral   Does the patient report anything that got in the way of taking their medications? No   In our efforts to provide the best possible care to you and others like you, can you think of anything that we could have done to help you after you left the hospital the first time, so that you might not have needed to return so soon? Other (Comment)  (None voiced)       Advance Care Planning

## 2024-04-25 NOTE — ED PROVIDER NOTES
diagnosis.     Thank you for resuming the care of this patient.  Please don't hesitate to contact me in the emergency department if you  have any additional questions.    Kathleen Almaraz MD, MSc      I am the Primary Clinician of Record.   Kathleen Almaraz MD (electronically signed)    (Please note that parts of this dictation were completed with voice recognition software. Quite often unanticipated grammatical, syntax, homophones, and other interpretive errors are inadvertently transcribed by the computer software. Please disregards these errors. Please excuse any errors that have escaped final proofreading.)

## 2024-04-25 NOTE — ED NOTES
Bedside and Verbal shift change report given to Nara RN (oncoming nurse) by Velma RN (offgoing nurse). Report included the following information Nurse Handoff Report, ED Encounter Summary, ED SBAR, Adult Overview, MAR, Recent Results, Cardiac Rhythm NSR, and Neuro Assessment. All questions addressed in room.

## 2024-04-25 NOTE — H&P
Hospitalist Admission Note    NAME:  Anette Cannon   :  1970   MRN:  [id number]     Date/Time:  2024 5:42 AM    Patient PCP: Jair Vieira MD    ______________________________________________________________________  Given the patient's current clinical presentation, I have a high level of concern for decompensation if discharged from the emergency department.  Complex decision making was performed, which includes reviewing the patient's available past medical records, laboratory results, and x-ray films.       My assessment of this patient's clinical condition and my plan of care is as follows.    Assessment / Plan:    Active Problems:  Hyponatremia secondary to CHF exacerbation  CHF exacerbation  Essential hypertension  Hyperlipidemia  UTI  Diabetes mellitus  GERD  MDD/CHANA/psychiatric disturbance  Benzodiazepine dependence  Chronic pain with opioid dependence  COPD  PERLA on CKD 3    Plan:  Hyponatremia secondary to CHF exacerbation  CHF exacerbation  Essential hypertension  Hyperlipidemia  Admit to telemetry monitoring  Nephrology consulted, greatly appreciate their expertise  Continue PTA aspirin, rosuvastatin  Continue PTA Bumex  Strict technique and output with daily weight  Fluid restrict to 1500 cc  Trend sodium    UTI  Empiric ceftriaxone  Trend urine culture and adjust antibiotics as indicated    Diabetes mellitus  Decrease glargine to 10 units daily  Corrective coverage insulin  Accu-Cheks  Diabetic diet  Hypoglycemia protocol in place    GERD  Continue PTA Protonix    MDD/CHANA/psychiatric disturbance  Continue PTA Rexulti, BuSpar, citalopram, duloxetine    Benzodiazepine dependence  Continue PTA Xanax-decreased to 1 mg 4 times daily as needed  -Decrease dose due to concerns for oversedation    Chronic pain with opioid dependence  Continue PTA oxycodone as needed  Hold PTA extended release morphine due to concerns for oversedation    COPD  Continue PTA Spiriva    PERLA on CKD 3  Trend  (L) 3.5 - 5.1 mmol/L    Chloride 90 (L) 97 - 108 mmol/L    CO2 32 21 - 32 mmol/L    Anion Gap 4 (L) 5 - 15 mmol/L    Glucose 119 (H) 65 - 100 mg/dL    BUN 22 (H) 6 - 20 MG/DL    Creatinine 1.94 (H) 0.55 - 1.02 MG/DL    Bun/Cre Ratio 11 (L) 12 - 20      Est, Glom Filt Rate 30 (L) >60 ml/min/1.73m2    Calcium 8.1 (L) 8.5 - 10.1 MG/DL    Total Bilirubin 0.5 0.2 - 1.0 MG/DL    ALT 20 12 - 78 U/L    AST 18 15 - 37 U/L    Alk Phosphatase 80 45 - 117 U/L    Total Protein 6.4 6.4 - 8.2 g/dL    Albumin 3.3 (L) 3.5 - 5.0 g/dL    Globulin 3.1 2.0 - 4.0 g/dL    Albumin/Globulin Ratio 1.1 1.1 - 2.2     Magnesium    Collection Time: 04/24/24 11:23 PM   Result Value Ref Range    Magnesium 1.6 1.6 - 2.4 mg/dL   Troponin    Collection Time: 04/24/24 11:23 PM   Result Value Ref Range    Troponin, High Sensitivity 10 0 - 51 ng/L   Brain Natriuretic Peptide    Collection Time: 04/24/24 11:23 PM   Result Value Ref Range    NT Pro- (H) <125 PG/ML   POCT Glucose    Collection Time: 04/24/24 11:25 PM   Result Value Ref Range    POC Glucose 126 (H) 65 - 117 mg/dL    Performed by: Hao Hernandez RN    Urinalysis with Reflex to Culture    Collection Time: 04/24/24 11:39 PM    Specimen: Urine   Result Value Ref Range    Color, UA YELLOW/STRAW      Appearance CLEAR CLEAR      Specific Gravity, UA 1.012      pH, Urine 5.0 5.0 - 8.0      Protein, UA Negative NEG mg/dL    Glucose, UA Negative NEG mg/dL    Ketones, Urine Negative NEG mg/dL    Bilirubin Urine Negative NEG      Blood, Urine Negative NEG      Urobilinogen, Urine 0.2 0.2 - 1.0 EU/dL    Nitrite, Urine Negative NEG      Leukocyte Esterase, Urine MODERATE (A) NEG      WBC, UA 20-50 0 - 4 /hpf    RBC, UA 0-5 0 - 5 /hpf    Epithelial Cells UA MANY (A) FEW /lpf    BACTERIA, URINE 1+ (A) NEG /hpf    Urine Culture if Indicated URINE CULTURE ORDERED (A) CNI     Urine Drug Screen    Collection Time: 04/24/24 11:39 PM   Result Value Ref Range    Amphetamine, Urine Negative NEG

## 2024-04-25 NOTE — CONSULTS
[facility]         NAME:Anette Cannon  MRN:[id number]   :1970     Patient seen    Hyponatremia, chf, sob      Blood pressure 109/63, pulse 76, temperature 98.4 °F (36.9 °C), temperature source Oral, resp. rate 13, height 1.651 m (5' 5\"), weight 81.6 kg (180 lb), last menstrual period 2024, SpO2 96 %.    Patient Active Problem List   Diagnosis    COPD exacerbation (HCC)    Endometriosis    Tobacco abuse    Aspiration pneumonia (HCC)    CKD (chronic kidney disease) stage 3, GFR 30-59 ml/min (HCC)    Essential hypertension    Severe obesity (HCC)    Change in mental status    Abnormal weight gain    Drug overdose    PERLA (acute kidney injury) (HCC)    Respiratory failure, acute (HCC)    Elevated LFTs    Positive blood culture    Hyperlipidemia LDL goal <100    Sepsis (HCC)    Anxiety and depression    Diabetic nephropathy associated with type 1 diabetes mellitus (HCC)    Uncontrolled type I diabetes mellitus with neuropathy    Bilateral leg edema    DKA (diabetic ketoacidosis) (HCC)    Hypoglycemia    Hyponatremia    COVID-19    Type 1 diabetes mellitus with hyperglycemia (HCC)    Hypoglycemia due to type 1 diabetes mellitus (HCC)    Hypoglycemia associated with diabetes (HCC)    Hyperglycemia    Drug-induced Parkinson's disease (HCC)    Benign essential tremor syndrome    Tremors of nervous system    Hyperthyroidism    Diabetic peripheral neuropathy associated with type 1 diabetes mellitus (HCC)    Chronic hypoxic respiratory failure (HCC)    Chronic hypoxemic respiratory failure (HCC)    Acute congestive heart failure, unspecified heart failure type (HCC)                Hyponatremia [E87.1]      has a past medical history of Achilles tendon rupture, Arthritis, Chronic kidney disease, Chronic pain, Diabetes (HCC), DM type 1 (diabetes mellitus, type 1) (HCC), Endometriosis, Gastric ulcer, GERD (gastroesophageal reflux disease), Herpes, Other and unspecified  hyperlipidemia, Panic attacks, Psychiatric disorder, PTSD (post-traumatic stress disorder), and Unspecified essential hypertension.    has a past surgical history that includes gyn; gyn; Colonoscopy; orthopedic surgery; orthopedic surgery (2002); pr unlisted procedure cardiac surgery; orthopedic surgery (Left, 02/07/2019); gyn; Cardiac procedure (N/A, 8/8/2023); invasive vascular (N/A, 8/8/2023); Cardiac procedure (N/A, 3/22/2024); and invasive vascular (N/A, 3/22/2024).   Prior to Admission Medications   Prescriptions Last Dose Informant Patient Reported? Taking?   ALPRAZolam (XANAX) 2 MG tablet  Self Yes No   Sig: Take 1 tablet by mouth. 4 times a daily   Blood Glucose Monitoring Suppl (TRUE METRIX METER) PATRICIA   No No   Sig: Test 10 times daily Dx Code: E10.65   Cholecalciferol (VITAMIN D3) 125 MCG (5000 UT) TABS   Yes No   Sig: Take 1 tablet by mouth daily   Continuous Blood Gluc Sensor (FREESTYLE JOSE 3 SENSOR) MISC   Yes No   Sig: by Does not apply route every 14 days   DULoxetine (CYMBALTA) 20 MG extended release capsule   Yes No   Sig: Take 1 capsule by mouth daily   Insulin Pen Needle (B-D ULTRAFINE III SHORT PEN) 31G X 8 MM MISC   No No   Sig: Use to inject insulin QID   Insulin Syringe-Needle U-100 31G X 15/64\" 1 ML MISC   No No   Sig: Tyra to inject insulin QID   Magnesium 400 MG TABS   Yes No   Sig: Take 1 tablet by mouth daily   Morphine Sulfate  MG T12A  Self Yes No   Sig: Take 100 mg by mouth in the morning and at bedtime. Max Daily Amount: 200 mg   Multiple Vitamin (MULTIVITAMIN) TABS   Yes No   Sig: Take 1 tablet by mouth daily   NOVOLOG 100 UNIT/ML injection vial   No No   Sig: Inject 6 units before meals + 1 units for every 50 mg/dl above 150 mg/dl--max 33 units/day--Dose change 12/12/23--updated med list--did not send prescription to the pharmacy   RELION GLUCOSE TEST STRIPS strip   No No   Sig: TEST 10 TIMES DAILY DUE TO FLUCTUATING BLOOD SUGARS   aspirin 81 MG EC tablet  Self Yes No

## 2024-04-25 NOTE — PROGRESS NOTES
End of Shift Note    Bedside shift change report given to Fatimah DELVALLE (oncoming nurse) by JESSICA CAICEDO RN (offgoing nurse).  Report included the following information SBAR, Kardex, Intake/Output, and MAR    Shift worked:  [id number]     Shift summary and any significant changes:     Patient had complaints of pain, PRN oxycodone given x1. Pt requested PRN xanax which was then given. Pt had no complaints of nausea. Scheduled medications given per MAR. Dual skin assessment completed and patient made comfortable in the room.    Patients home medications and insulin syringes are located in the oncology safety closet until patients boyfriend comes to take them home.      Concerns for physician to address:    Zone phone for oncoming shift:           JESSICA CAICEDO RN

## 2024-04-25 NOTE — PROGRESS NOTES
Nephrology Progress Note  [facility] [place] [facility]  [address], [address]  [place] [address]  Phone - [phone]  Fax - [phone]                 Patient: Anette Cannon                     YOB: 1970        Date- 4/25/2024                                     Admit Date: 4/24/2024   CC: Follow up for ckd, hyponatremia          IMPRESSION & PLAN:   Hyponatremia due to non compliance to fluid restriction + chf ---na 126 on 4-24-24-  Chronic kidney disease 3 A (baseline 1.2-1.4)  Fluid overload   PERLA  CHF--right-sided  DM  Htn  edema      PLAN-  Bumex 2 mg iv bid  Fluid restriction 1200 ml/day  Check bmp in am     Subjective:  Interval History:   -  she presented to er with edema and fall.  She has hyponatremia with na 126  Cr 1.94 on admission    Objective:   Vitals:    04/25/24 0443 04/25/24 0620 04/25/24 0645 04/25/24 0847   BP:  (!) 154/83 109/63    Pulse: 75 79  76   Resp: 16 13  13   Temp:       TempSrc:       SpO2:  92%  96%   Weight:       Height:          I/O last 3 completed shifts:  In: 150 [IV Piggyback:150]  Out: -   I/O this shift:  In: 10 [I.V.:10]  Out: -       Physical exam:    GEN: NAD  NECK- no mass  RESP: No wheezing, decreased BS b/l  CVS: S1,S2  RRR  NEURO: Normal speech, Non focal  EXT: ++ Edema   PSYCH: Normal Mood    Chart reviewed.         Pertinent Notes reviewed.     Data Review :  Lab Results   Component Value Date/Time     04/25/2024 10:32 AM    K 3.8 04/25/2024 10:32 AM    CL 89 04/25/2024 10:32 AM    CO2 35 04/25/2024 10:32 AM    BUN 20 04/25/2024 10:32 AM    CREATININE 1.53 04/25/2024 10:32 AM    GLUCOSE 219 04/25/2024 10:32 AM    GLUCOSE 154 04/01/2024 12:44 PM    CALCIUM 8.2 04/25/2024 10:32 AM       Lab Results   Component Value Date    WBC 7.6 04/24/2024    HGB 10.2 (L) 04/24/2024    HCT 31.5 (L) 04/24/2024    MCV 88.7 04/24/2024     04/24/2024      Recent Labs      apply route every 14 days    tiotropium (SPIRIVA RESPIMAT) 2.5 MCG/ACT AERS inhaler Inhale 2 puffs into the lungs daily    metoclopramide (REGLAN) 5 MG tablet TAKE ONE TABLET BY MOUTH TWICE A DAY    glucagon, rDNA, (GLUCAGEN HYPOKIT) 1 MG SOLR injection USE AS DIRECTED IN KIT INSTRUCTION    Insulin Pen Needle (B-D ULTRAFINE III SHORT PEN) 31G X 8 MM MISC Use to inject insulin QID    Insulin Syringe-Needle U-100 31G X 15/64\" 1 ML MISC Tyra to inject insulin QID    esomeprazole (NEXIUM) 40 MG delayed release capsule Take 1 capsule by mouth in the morning and at bedtime    bumetanide (BUMEX) 2 MG tablet Take 2 tablets by mouth 2 times daily    citalopram (CELEXA) 40 MG tablet Take 1 tablet by mouth daily    Magnesium 400 MG TABS Take 1 tablet by mouth daily    levonorgestrel (MIRENA, 52 MG,) IUD 52 mg 1 each by IntraUTERine route once    Cholecalciferol (VITAMIN D3) 125 MCG (5000 UT) TABS Take 1 tablet by mouth daily    brexpiprazole (REXULTI) 1 MG TABS tablet Take 2 tablets by mouth daily    Multiple Vitamin (MULTIVITAMIN) TABS Take 1 tablet by mouth daily    Blood Glucose Monitoring Suppl (TRUE METRIX METER) PATRICIA Test 10 times daily Dx Code: E10.65    RELION GLUCOSE TEST STRIPS strip TEST 10 TIMES DAILY DUE TO FLUCTUATING BLOOD SUGARS    oxyCODONE (ROXICODONE) 5 MG immediate release tablet Take 1 tablet by mouth every 4 hours as needed.    ALPRAZolam (XANAX) 2 MG tablet Take 1 tablet by mouth. 4 times a daily    Morphine Sulfate  MG T12A Take 100 mg by mouth in the morning and at bedtime. Max Daily Amount: 200 mg    aspirin 81 MG EC tablet Take 1 tablet by mouth daily    busPIRone (BUSPAR) 15 MG tablet Take 15 mg by mouth 3 times daily    butalbital-acetaminophen-caffeine (FIORICET, ESGIC) -40 MG per tablet Take 1 tablet by mouth every 6 hours as needed    diclofenac sodium (VOLTAREN) 1 % GEL Apply 2 g topically 4 times daily as needed    naloxone 4 MG/0.1ML LIQD nasal spray Use 1 spray intranasally, then

## 2024-04-25 NOTE — ED NOTES
This RN tried to call pharmacy to verify what to do with medication bags. Pharmacy left me on hold for 10 minutes. Will let day shift RN call pharmacy to verify what to do.

## 2024-04-25 NOTE — ED NOTES
0606: This RN and EDT Monika walking past the room, EDOLLIE Frank observed the patient holding a needle and syringe in her hand. When this RN walked in the room to see what the patient was doing, patient hid syringe. This RN asked patient asked what the syringe was for and what was in the syringe. Patient states she was not doing anything with them. This RN notified Hospitalist Donnell and Charge ADELIA Smith at this time. Per MD Jacobo, take the patients belongings and turn them into security since we are unable to to verify anything previously in the syringes.This RN, RN Elba, ADELIA Arechiga, EDT Monika and EDT Scott present to strip patient and check belongings.  -15 needle syringes  -1 Glucagon pen   -3 Oral glucose containers  -3 vials of Novalog, 1 vial of humalog  -1 big bottle of 2 mg tablets of xanax- 120 quantity when filled.   -1 inhaler  -1 bottle of motrin creme  MD is notified of the medications collected and placed into a bio-hazard bag. Pharmacy called by this RN and Charge Luis at this time with no answer.

## 2024-04-25 NOTE — PROGRESS NOTES
0730  Bedside shift report received.  Admiited for increased SOB after ran out of home oxygen.  +3 pitting edema bilateral lower extremities.  No bowel movement for 7 days per patient report.  States fell and hit her head at 1400 yesterday.     0745  Shift assessment completed.  Patient falling asleep during conversation.  When awakens patient patient requesting morphine for lower abdominal pain \"Endometriosis\" and chronic knee pain.      1037  AM medications given.    1400  IV Bumex and albumin given as ordered.    1520  Report called. Patient to go to inpatient unit.

## 2024-04-26 VITALS
TEMPERATURE: 97.9 F | DIASTOLIC BLOOD PRESSURE: 85 MMHG | RESPIRATION RATE: 16 BRPM | OXYGEN SATURATION: 99 % | HEIGHT: 65 IN | BODY MASS INDEX: 30.12 KG/M2 | WEIGHT: 180.78 LBS | HEART RATE: 68 BPM | SYSTOLIC BLOOD PRESSURE: 114 MMHG

## 2024-04-26 LAB
ANION GAP SERPL CALC-SCNC: 3 MMOL/L (ref 5–15)
BACTERIA SPEC CULT: NORMAL
BASOPHILS # BLD: 0 K/UL (ref 0–0.1)
BASOPHILS NFR BLD: 0 % (ref 0–1)
BUN SERPL-MCNC: 24 MG/DL (ref 6–20)
BUN/CREAT SERPL: 19 (ref 12–20)
CALCIUM SERPL-MCNC: 8.1 MG/DL (ref 8.5–10.1)
CC UR VC: NORMAL
CHLORIDE SERPL-SCNC: 96 MMOL/L (ref 97–108)
CO2 SERPL-SCNC: 30 MMOL/L (ref 21–32)
CREAT SERPL-MCNC: 1.28 MG/DL (ref 0.55–1.02)
DIFFERENTIAL METHOD BLD: ABNORMAL
EOSINOPHIL # BLD: 0.2 K/UL (ref 0–0.4)
EOSINOPHIL NFR BLD: 4 % (ref 0–7)
ERYTHROCYTE [DISTWIDTH] IN BLOOD BY AUTOMATED COUNT: 17.6 % (ref 11.5–14.5)
GLUCOSE BLD STRIP.AUTO-MCNC: 120 MG/DL (ref 65–117)
GLUCOSE BLD STRIP.AUTO-MCNC: 200 MG/DL (ref 65–117)
GLUCOSE SERPL-MCNC: 199 MG/DL (ref 65–100)
HCT VFR BLD AUTO: 31.1 % (ref 35–47)
HGB BLD-MCNC: 10 G/DL (ref 11.5–16)
IMM GRANULOCYTES # BLD AUTO: 0 K/UL (ref 0–0.04)
IMM GRANULOCYTES NFR BLD AUTO: 0 % (ref 0–0.5)
LYMPHOCYTES # BLD: 2.2 K/UL (ref 0.8–3.5)
LYMPHOCYTES NFR BLD: 48 % (ref 12–49)
MCH RBC QN AUTO: 28.3 PG (ref 26–34)
MCHC RBC AUTO-ENTMCNC: 32.2 G/DL (ref 30–36.5)
MCV RBC AUTO: 88.1 FL (ref 80–99)
MONOCYTES # BLD: 0.5 K/UL (ref 0–1)
MONOCYTES NFR BLD: 12 % (ref 5–13)
NEUTS SEG # BLD: 1.7 K/UL (ref 1.8–8)
NEUTS SEG NFR BLD: 36 % (ref 32–75)
NRBC # BLD: 0 K/UL (ref 0–0.01)
NRBC BLD-RTO: 0 PER 100 WBC
PLATELET # BLD AUTO: 231 K/UL (ref 150–400)
PMV BLD AUTO: 9.5 FL (ref 8.9–12.9)
POTASSIUM SERPL-SCNC: 4 MMOL/L (ref 3.5–5.1)
RBC # BLD AUTO: 3.53 M/UL (ref 3.8–5.2)
SERVICE CMNT-IMP: ABNORMAL
SERVICE CMNT-IMP: ABNORMAL
SERVICE CMNT-IMP: NORMAL
SODIUM SERPL-SCNC: 129 MMOL/L (ref 136–145)
WBC # BLD AUTO: 4.7 K/UL (ref 3.6–11)

## 2024-04-26 PROCEDURE — 6360000002 HC RX W HCPCS: Performed by: GENERAL ACUTE CARE HOSPITAL

## 2024-04-26 PROCEDURE — 2700000000 HC OXYGEN THERAPY PER DAY

## 2024-04-26 PROCEDURE — 6370000000 HC RX 637 (ALT 250 FOR IP): Performed by: GENERAL ACUTE CARE HOSPITAL

## 2024-04-26 PROCEDURE — 6360000002 HC RX W HCPCS: Performed by: INTERNAL MEDICINE

## 2024-04-26 PROCEDURE — 6360000002 HC RX W HCPCS: Performed by: STUDENT IN AN ORGANIZED HEALTH CARE EDUCATION/TRAINING PROGRAM

## 2024-04-26 PROCEDURE — 80048 BASIC METABOLIC PNL TOTAL CA: CPT

## 2024-04-26 PROCEDURE — 82962 GLUCOSE BLOOD TEST: CPT

## 2024-04-26 PROCEDURE — 2580000003 HC RX 258: Performed by: STUDENT IN AN ORGANIZED HEALTH CARE EDUCATION/TRAINING PROGRAM

## 2024-04-26 PROCEDURE — 6370000000 HC RX 637 (ALT 250 FOR IP): Performed by: STUDENT IN AN ORGANIZED HEALTH CARE EDUCATION/TRAINING PROGRAM

## 2024-04-26 PROCEDURE — 85025 COMPLETE CBC W/AUTO DIFF WBC: CPT

## 2024-04-26 PROCEDURE — 36415 COLL VENOUS BLD VENIPUNCTURE: CPT

## 2024-04-26 PROCEDURE — 94640 AIRWAY INHALATION TREATMENT: CPT

## 2024-04-26 RX ORDER — BUMETANIDE 0.25 MG/ML
2 INJECTION INTRAMUSCULAR; INTRAVENOUS 2 TIMES DAILY
Status: DISCONTINUED | OUTPATIENT
Start: 2024-04-26 | End: 2024-04-26 | Stop reason: HOSPADM

## 2024-04-26 RX ORDER — MORPHINE SULFATE 30 MG/1
60 TABLET, FILM COATED, EXTENDED RELEASE ORAL 2 TIMES DAILY
Status: DISCONTINUED | OUTPATIENT
Start: 2024-04-26 | End: 2024-04-26 | Stop reason: HOSPADM

## 2024-04-26 RX ORDER — BUMETANIDE 2 MG/1
2 TABLET ORAL 2 TIMES DAILY
Qty: 60 TABLET | Refills: 3 | Status: SHIPPED | OUTPATIENT
Start: 2024-04-26

## 2024-04-26 RX ADMIN — ROSUVASTATIN 40 MG: 20 TABLET, FILM COATED ORAL at 09:17

## 2024-04-26 RX ADMIN — OXYCODONE 5 MG: 5 TABLET ORAL at 05:24

## 2024-04-26 RX ADMIN — SODIUM CHLORIDE, PRESERVATIVE FREE 10 ML: 5 INJECTION INTRAVENOUS at 09:19

## 2024-04-26 RX ADMIN — CITALOPRAM HYDROBROMIDE 40 MG: 20 TABLET ORAL at 09:17

## 2024-04-26 RX ADMIN — IPRATROPIUM BROMIDE 0.5 MG: 0.5 SOLUTION RESPIRATORY (INHALATION) at 15:04

## 2024-04-26 RX ADMIN — BREXPIPRAZOLE 2 MG: 1 TABLET ORAL at 11:38

## 2024-04-26 RX ADMIN — INSULIN LISPRO 2 UNITS: 100 INJECTION, SOLUTION INTRAVENOUS; SUBCUTANEOUS at 12:34

## 2024-04-26 RX ADMIN — BUMETANIDE 2 MG: 0.25 INJECTION INTRAMUSCULAR; INTRAVENOUS at 09:18

## 2024-04-26 RX ADMIN — ALPRAZOLAM 1 MG: 0.5 TABLET ORAL at 07:51

## 2024-04-26 RX ADMIN — MORPHINE SULFATE 60 MG: 30 TABLET, FILM COATED, EXTENDED RELEASE ORAL at 13:29

## 2024-04-26 RX ADMIN — SODIUM CHLORIDE 1000 MG: 900 INJECTION INTRAVENOUS at 04:26

## 2024-04-26 RX ADMIN — ENOXAPARIN SODIUM 40 MG: 100 INJECTION SUBCUTANEOUS at 09:17

## 2024-04-26 RX ADMIN — DULOXETINE HYDROCHLORIDE 20 MG: 20 CAPSULE, DELAYED RELEASE ORAL at 09:17

## 2024-04-26 RX ADMIN — ASPIRIN 81 MG: 81 TABLET, COATED ORAL at 09:17

## 2024-04-26 RX ADMIN — OXYCODONE 5 MG: 5 TABLET ORAL at 12:27

## 2024-04-26 RX ADMIN — BUSPIRONE HYDROCHLORIDE 15 MG: 5 TABLET ORAL at 09:16

## 2024-04-26 RX ADMIN — INSULIN GLARGINE 10 UNITS: 100 INJECTION, SOLUTION SUBCUTANEOUS at 09:18

## 2024-04-26 RX ADMIN — BUSPIRONE HYDROCHLORIDE 15 MG: 5 TABLET ORAL at 13:29

## 2024-04-26 ASSESSMENT — PAIN SCALES - GENERAL
PAINLEVEL_OUTOF10: 0
PAINLEVEL_OUTOF10: 8
PAINLEVEL_OUTOF10: 10
PAINLEVEL_OUTOF10: 8

## 2024-04-26 ASSESSMENT — PAIN DESCRIPTION - ORIENTATION
ORIENTATION: LOWER
ORIENTATION: RIGHT;LEFT
ORIENTATION: LOWER

## 2024-04-26 ASSESSMENT — PAIN DESCRIPTION - LOCATION
LOCATION: ABDOMEN
LOCATION: ABDOMEN
LOCATION: OTHER (COMMENT)

## 2024-04-26 ASSESSMENT — PAIN DESCRIPTION - DESCRIPTORS
DESCRIPTORS: STABBING;SHARP
DESCRIPTORS: STABBING;SHARP
DESCRIPTORS: ACHING;STABBING;SHARP

## 2024-04-26 NOTE — DISCHARGE SUMMARY
Discharge Summary    Name: Anette Cannon  [id number]  YOB: 1970 (Age: 54 y.o.)   Date of Admission: 4/24/2024  Date of Discharge: 4/26/2024  Attending Physician: Trang Dave MD      Discharge Diagnosis:   Hyponatremia secondary to CHF exacerbation  CHF exacerbation  Uses 2 L/M HS at home  Essential hypertension  Hyperlipidemia  UTI  Diabetes mellitus  GERD  MDD/CHANA/psychiatric disturbance  Benzodiazepine dependence  Chronic pain with opioid dependence  COPD  PERLA on CKD 3    Consultations:  IP CONSULT TO NEPHROLOGY      Brief Admission History/Reason for Admission Per Jeronimo Jacobo, DO:   Anette Cannon is a 54 y.o.  female with PMHx as listed below presenting to the emergency department with complaints of increasing swelling, shortness of breath, oxygen concentrator failure, fall she attributes to his somnolence (high-dose opioids and Xanax at home which she took prior to presentation with marked somnolence on exam), dysuria, and malaise.  Of note patient recently admitted from 4/8-4/11 for acute on chronic diastolic heart failure complicated by hyponatremia due to noncompliance with medications and fluid restriction.  ROS otherwise negative.     In the ED, patient afebrile and hemodynamically stable saturating mid 90s on room air.  CXR demonstrating mild pulmonary edema.  Labs demonstrate: Sodium 126 (discharged at 135), potassium 3.3, glucose 119, BUN 22, creatinine 1.94 (baseline 1.5), LFTs grossly unremarkable, , UDS positive for barbiturates, benzodiazepines, and opiates, UA reflex to culture, magnesium 1.6,-troponin 10, WBC 7.6, hemoglobin 10.2, platelets 283.         Brief Hospital Course by Main Problems:   Hyponatremia secondary to CHF exacerbation  CHF exacerbation  Essential hypertension  Hyperlipidemia  Uses 2 L/M HS at home  Nephrology consulted, greatly appreciate their expertise  Continue PTA aspirin, rosuvastatin  Switch IV to

## 2024-04-26 NOTE — PROGRESS NOTES
Nursing contacted Nocturnist/cross cover provider and notified patient lab result of accucheck 354 due for ssi 4 units and reported to notify dr per orders. VSS. No other reported concerns at this time. Ordered ok to give the 4 units per ssi already given- no further additional insulin at this time. Will defer further evaluation/management to the day shift primary care team. Patient denies any further complaints or concerns. No acute distress reported. Nursing to notify Hospitalist for further/continued concerns. Will remain available overnight for further concerns if nursing/patient needs.     Non-billable note.

## 2024-04-26 NOTE — PROGRESS NOTES
Spiritual Care Assessment/Progress Note  [facility]    Name: Anette Cannon MRN: [id number]    Age: 54 y.o.     Sex: female   Language: English     Date: 4/26/2024            Total Time Calculated: 17 min              Spiritual Assessment begun in [facility]  Service Provided For: Patient, Friend  Referral/Consult From: Rounding  Encounter Overview/Reason: Initial Encounter    Spiritual beliefs:      [x] Involved in a tee tradition/spiritual practice: Hinduism     [] Supported by a tee community:      [] Claims no spiritual orientation:      [] Seeking spiritual identity:           [] Adheres to an individual form of spirituality:      [] Not able to assess:                Identified resources for coping and support system:   Support System: Significant other       [x] Prayer                  [] Devotional reading               [] Music                  [] Guided Imagery     [] Pet visits                                        [x] Other: communion     Specific area/focus of visit   Encounter:    Crisis:    Spiritual/Emotional needs: Type: Spiritual Support  Ritual, Rites and Sacraments:    Grief, Loss, and Adjustments:    Ethics/Mediation:    Behavioral Health:    Palliative Care:    Advance Care Planning:           Narrative:   Reviewed chart prior to visit. Patient was receptive to a  visit. She shared she is feeling better but knows she needs to remain in the hospital a bit longer. She was grateful for the care given here. She and her visitor thanked me for the visit.     GAIL Carpenter    paging service [phone]

## 2024-04-26 NOTE — DISCHARGE INSTRUCTIONS
HOSPITALIST DISCHARGE INSTRUCTIONS    It is important that you take the medication exactly as they are prescribed.   Keep your medication in the bottles provided by the pharmacist and keep a list of the medication names, dosages, and times to be taken in your wallet.   Do not take other medications without consulting your doctor.     What to do at Home  Recommended diet:   ADULT DIET; Regular; 4 carb choices (60 gm/meal); Low Fat/Low Chol/High Fiber/2 gm Na; 1200 ml    Recommended activity:   activity as tolerated    If you have questions regarding the hospital related prescriptions or hospital related issues please call [facility] office at . You can always direct your questions to your primary care doctor if you are unable to reach your hospital physician; your PCP works as an extension of your hospital doctor just like your hospital doctor is an extension of your PCP for your time at the hospital ([facility])    If you experience any of the following symptoms then please call your primary care physician or return to the emergency room if you cannot get hold of your doctor:    Fever, chills, nausea, vomiting, or persistent diarrhea  Worsening weakness or new problems with your speech or balance  Dark stools or visible blood in your stools  New Leg swelling or shortness of breath as these could be signs of a clot    Additional Instructions:    Please follow up with your PCP in one week.   Continue Bumex 2mg twice a day.   Continue daily weight check  Fluid restrict to 1200 cc a day  Please check your sodium level with your PCP weekly for 3-4 weeks  Bring these papers with you to your follow up appointments. The papers will help your doctors be sure to continue the care plan from the hospital.        Information obtained by :  I understand that if any problems occur once I am at home I am to contact my physician.    I understand and acknowledge receipt of the instructions indicated above.  [employer] information has been compiled for use by healthcare practitioners and consumers in the [place] and therefore [employer] does not warrant that uses outside of the [place] are appropriate, unless specifically indicated otherwise. [employer] drug information does not endorse drugs, diagnose patients or recommend therapy. [employer] drug information is an informational resource designed to assist licensed healthcare practitioners in caring for their patients and/or to serve consumers viewing this service as a supplement to, and not a substitute for, the expertise, skill, knowledge and judgment of healthcare practitioners. The absence of a warning for a given drug or drug combination in no way should be construed to indicate that the drug or drug combination is safe, effective or appropriate for any given patient. [facility] does not assume any responsibility for any aspect of healthcare administered with the aid of information [employer] provides. The information contained herein is not intended to cover all possible uses, directions, precautions, warnings, drug interactions, allergic reactions, or adverse effects. If you have questions about the drugs you are taking, check with your doctor, nurse or pharmacist.  Copyright 5003-4383 [facility] "[employer]. Version: 7.02. Revision date: 2/26/2021.  Care instructions adapted under license by [employer]. If you have questions about a medical condition or this instruction, always ask your healthcare professional. [employer], [employer] disclaims any warranty or liability for your use of this information.       Hyponatremia: Care Instructions  Your Care Instructions     Hyponatremia (say \"qa-zb-yij-TREE-juliette-uh\") means that you don't have enough sodium in your blood. It can cause nausea, vomiting, and headaches. Or you may not feel hungry. In serious cases, it can cause seizures, a coma, or even death.  Hyponatremia is not a disease. It is a problem caused by something

## 2024-04-26 NOTE — PROGRESS NOTES
Hospitalist Progress Note    NAME:   Anette Cannon   : 1970   MRN: [id number]     Date/Time: 2024    Patient PCP: Jair Vieira MD      Assessment / Plan:          Medical Decision Making:   I personally reviewed labs: yes, as below   I personally reviewed imaging reports: yes, as below   Toxic drug monitoring:   Discussed case with: Pt, RN, d/w CM for placement, ***   Code Status: Full Code       Subjective:  Assessment / Plan:        ***  Discussed with RN events overnight.       Objective:      VITALS:   Last 24hrs VS reviewed since prior progress note. Most recent are:  Patient Vitals for the past 24 hrs:   BP Temp Temp src Pulse Resp SpO2 Weight   24 0746 114/85 97.9 °F (36.6 °C) -- 68 16 99 % --   24 0600 -- -- -- -- -- -- 82 kg (180 lb 12.4 oz)   24 0409 (!) 104/50 98.4 °F (36.9 °C) Oral 71 18 94 % --   24 133/66 98.2 °F (36.8 °C) Oral 75 18 96 % --   24 1623 135/72 98.4 °F (36.9 °C) Oral 72 16 96 % --   24 1515 122/68 98.2 °F (36.8 °C) Oral 65 16 95 % --   24 1445 -- -- -- 65 12 98 % --   24 1357 (!) 131/92 -- -- -- -- -- --   24 1330 (!) 131/92 -- -- 72 18 -- --   24 1320 -- -- -- 70 12 97 % --   24 1300 121/77 -- -- 71 13 -- --   24 1230 (!) 119/54 -- -- 72 10 96 % --         Intake/Output Summary (Last 24 hours) at 2024 1200  Last data filed at 2024 0930  Gross per 24 hour   Intake 1105 ml   Output 4200 ml   Net -3095 ml        I had a face to face encounter and independently examined this patient, as outlined below:  PHYSICAL EXAM:  General: WD, WN. No acute distress    EENT:  EOMI. Anicteric sclerae. MMM  Resp:  CTA bilaterally. No wheezing. No rales.  No accessory muscle use  CV:  Regular  rhythm,  No edema  GI/:  Soft. Non distended. No tenderness.  +Bowel sounds  Neurologic:  Alert and oriented X 3, normal speech,   Psych:   Not anxious nor agitated  Skin/MSK: No rashes.  No

## 2024-04-26 NOTE — PROGRESS NOTES
04:29 AM    GLUCOSE 154 04/01/2024 12:44 PM    CALCIUM 8.1 04/26/2024 04:29 AM       Lab Results   Component Value Date    WBC 4.7 04/26/2024    HGB 10.0 (L) 04/26/2024    HCT 31.1 (L) 04/26/2024    MCV 88.1 04/26/2024     04/26/2024      Recent Labs     04/24/24  2323 04/25/24  1032 04/26/24  0429   * 127* 129*   K 3.3* 3.8 4.0   CL 90* 89* 96*   CO2 32 35* 30   GLUCOSE 119* 219* 199*   BUN 22* 20 24*   CREATININE 1.94* 1.53* 1.28*   CALCIUM 8.1* 8.2* 8.1*         Recent Labs     04/24/24 2323 04/26/24  0505   WBC 7.6 4.7   RBC 3.55* 3.53*   HGB 10.2* 10.0*   HCT 31.5* 31.1*   MCV 88.7 88.1   MCH 28.7 28.3   MCHC 32.4 32.2   RDW 17.6* 17.6*    231   MPV 9.5 9.5       Lab Results   Component Value Date/Time    IRON 17 (L) 06/26/2023 05:13 PM    TIBC 345 06/26/2023 05:13 PM     No results found for: \"PTH\"  Lab Results   Component Value Date/Time    LABA1C 9.3 (H) 04/01/2024 12:44 PM     Lab Results   Component Value Date/Time    COLORU YELLOW/STRAW 04/24/2024 11:39 PM    CLARITYU CLOUDY (A) 04/25/2023 01:47 PM    GLUCOSEU Negative 04/24/2024 11:39 PM    BILIRUBINUR Negative 04/24/2024 11:39 PM    BILIRUBINUR Negative 04/25/2023 01:47 PM    KETUA Negative 04/24/2024 11:39 PM    SPECGRAV 1.012 04/24/2024 11:39 PM    BLOODU Negative 04/24/2024 11:39 PM    PHUR 5.0 04/25/2023 01:47 PM    PROTEINU Negative 04/24/2024 11:39 PM    NITRU Negative 04/24/2024 11:39 PM    LEUKOCYTESUR MODERATE (A) 04/24/2024 11:39 PM     US Results (most recent):  Medication list  reviewed  Current Facility-Administered Medications   Medication Dose Route Frequency    morphine (MS CONTIN) extended release tablet 60 mg  60 mg Oral BID    ALPRAZolam (XANAX) tablet 1 mg  1 mg Oral 4x Daily PRN    aspirin EC tablet 81 mg  81 mg Oral Daily    brexpiprazole (REXULTI) tablet 2 mg  2 mg Oral Daily    busPIRone (BUSPAR) tablet 15 mg  15 mg Oral TID    citalopram (CELEXA) tablet 40 mg  40 mg Oral Daily    DULoxetine (CYMBALTA)

## 2024-04-26 NOTE — PROGRESS NOTES
PCP hospital follow-up transitional care appointment has been scheduled with Dr. Vieira on 5/1/24 at 1000 as previously scheduled. Pending patient discharge. Amy Crystal, Care Management Assistant

## 2024-04-26 NOTE — PROGRESS NOTES
15:45- Patient determined to be stable for discharge by attending provider. I have reviewed the discharge instructions and follow-up appointments with the patient. They verbalized understanding and all questions were answered to their satisfaction. No complaints or further questions were expressed.       New medications: Appropriate educational materials and medication side effect teaching were provided.       PIV and telemetry monitoring were removed prior to discharge.     All personal items collected during admission were returned to the patient prior to discharge.    Eve Zarco RN    16:10- Patient brought back up to unit by DAVION Hazel as patient's ride would not be here for approximately 30 minutes.

## 2024-04-27 NOTE — PROGRESS NOTES
Physician Progress Note      PATIENT:               CLAUDE SPANGLER  CSN #:                  [id number]  :                       1970  ADMIT DATE:       2024 11:05 PM  DISCH DATE:        2024 5:18 PM  RESPONDING  PROVIDER #:        Trang Dave MD          QUERY TEXT:    Dr Dave  Pt admitted with hyponatremia and has CHF exacerbation documented. If   possible, please document in progress notes and discharge summary further   specificity regarding the type and acuity of CHF:      The medical record reflects the following:  Risk Factors: HTN, DM  Clinical Indicators:  prior admission for 'acute diastolic CHF' on HP, chest   xray  shows mild pulmonary edema; proBNP 640  Treatment: fluid restriction, daily weights, Bumex IV  Options provided:  -- Acute on Chronic Systolic CHF/HFrEF  -- Acute on Chronic Diastolic CHF/HFpEF  -- Acute on Chronic Systolic and Diastolic CHF  -- Other - I will add my own diagnosis  -- Disagree - Not applicable / Not valid  -- Disagree - Clinically unable to determine / Unknown  -- Refer to Clinical Documentation Reviewer    PROVIDER RESPONSE TEXT:    This patient is in acute on chronic diastolic CHF/HFpEF.    Query created by: Gretta Chandler on 2024 10:11 AM      Electronically signed by:  Trang Dave MD 2024 2:09 PM

## 2024-05-02 ENCOUNTER — TELEPHONE (OUTPATIENT)
Age: 54
End: 2024-05-02

## 2024-05-02 NOTE — TELEPHONE ENCOUNTER
Pt LVM stating she is currently in the hospital and wanted a call back.   LVM asking pt to return call.

## 2024-05-09 ENCOUNTER — TELEPHONE (OUTPATIENT)
Age: 54
End: 2024-05-09

## 2024-05-09 NOTE — TELEPHONE ENCOUNTER
Since the bmp has to do with her sodium levels and Dr. Castro is managing this, he should be the one to place this order.  Since she had to cancel her visit last week, is she able to come at 3:50pm tomorrow?  If not, I can see if another cancellation opens up in the next 2 weeks and we can reach out when this becomes available.

## 2024-05-09 NOTE — TELEPHONE ENCOUNTER
Pt LVM stating she has been discharged and was told that she will need to have a standing order placed to have BMP checked every week. She states the results will need to go to Dr Castro. Pt asked should she contact Dr Castro?

## 2024-05-09 NOTE — TELEPHONE ENCOUNTER
Patient notified of message per Dr. Schmitt and voiced understanding of what was read to them.   She states she has to contact Maco to see if he can drive her to the appt tomorrow at 3:50 PM. Pt states she fell at the hospital and had to get stitches in her head right before being discharged. She says she tripped over her own feet and hit her head on the corner of the desk in the room. Pt states she will call back and let Dr Schmitt know if she can take the appt.

## 2024-05-10 ENCOUNTER — TELEPHONE (OUTPATIENT)
Age: 54
End: 2024-05-10

## 2024-05-10 NOTE — TELEPHONE ENCOUNTER
I have a 3:50 on Monday.  Can she take this?  How much lantus is she currently taking?  I would recommend she take 4 more units now and then increase her dose by 4 units until she sees me on Monday.

## 2024-05-10 NOTE — TELEPHONE ENCOUNTER
Patient notified of message per Dr. Schmitt via voice message. In previous call, pt states she is up to 30 units of Lantus. Asked pt to call back to confirm she received message.    Split-Thickness Skin Graft Text: The defect edges were debeveled with a #15 scalpel blade.  Given the location of the defect, shape of the defect and the proximity to free margins a split thickness skin graft was deemed most appropriate.  Using a sterile surgical marker, the primary defect shape was transferred to the donor site. The split thickness graft was then harvested.  The skin graft was then placed in the primary defect and oriented appropriately.

## 2024-05-10 NOTE — TELEPHONE ENCOUNTER
Pt LVM stating she will not be able to make it to the office today but she is available any time on Monday.

## 2024-05-10 NOTE — TELEPHONE ENCOUNTER
5/10/2024  10:36 AM      Patient stated her blood sugar is 372 right now.    Pt#[phone]    Thanks,  Josie Lo

## 2024-05-10 NOTE — TELEPHONE ENCOUNTER
Ok if this is the case then maybe she should not take the extra dose of lantus and should stay on her current dose.  Please clarify what her current dose is.  Thanks!

## 2024-05-13 ENCOUNTER — OFFICE VISIT (OUTPATIENT)
Age: 54
End: 2024-05-13
Payer: MEDICARE

## 2024-05-13 VITALS
DIASTOLIC BLOOD PRESSURE: 74 MMHG | BODY MASS INDEX: 27.63 KG/M2 | WEIGHT: 165.8 LBS | SYSTOLIC BLOOD PRESSURE: 134 MMHG | HEIGHT: 65 IN | HEART RATE: 103 BPM

## 2024-05-13 DIAGNOSIS — R79.89 ELEVATED LFTS: ICD-10-CM

## 2024-05-13 DIAGNOSIS — E78.2 MIXED HYPERLIPIDEMIA: ICD-10-CM

## 2024-05-13 DIAGNOSIS — R94.6 ABNORMAL RESULTS OF THYROID FUNCTION STUDIES: ICD-10-CM

## 2024-05-13 DIAGNOSIS — I10 ESSENTIAL (PRIMARY) HYPERTENSION: ICD-10-CM

## 2024-05-13 DIAGNOSIS — E10.65 TYPE 1 DIABETES MELLITUS WITH HYPERGLYCEMIA (HCC): Primary | ICD-10-CM

## 2024-05-13 PROCEDURE — 3075F SYST BP GE 130 - 139MM HG: CPT | Performed by: INTERNAL MEDICINE

## 2024-05-13 PROCEDURE — 1111F DSCHRG MED/CURRENT MED MERGE: CPT | Performed by: INTERNAL MEDICINE

## 2024-05-13 PROCEDURE — 3078F DIAST BP <80 MM HG: CPT | Performed by: INTERNAL MEDICINE

## 2024-05-13 PROCEDURE — G2211 COMPLEX E/M VISIT ADD ON: HCPCS | Performed by: INTERNAL MEDICINE

## 2024-05-13 PROCEDURE — G8417 CALC BMI ABV UP PARAM F/U: HCPCS | Performed by: INTERNAL MEDICINE

## 2024-05-13 PROCEDURE — 99215 OFFICE O/P EST HI 40 MIN: CPT | Performed by: INTERNAL MEDICINE

## 2024-05-13 PROCEDURE — 2022F DILAT RTA XM EVC RTNOPTHY: CPT | Performed by: INTERNAL MEDICINE

## 2024-05-13 PROCEDURE — 3017F COLORECTAL CA SCREEN DOC REV: CPT | Performed by: INTERNAL MEDICINE

## 2024-05-13 PROCEDURE — 3046F HEMOGLOBIN A1C LEVEL >9.0%: CPT | Performed by: INTERNAL MEDICINE

## 2024-05-13 PROCEDURE — G8427 DOCREV CUR MEDS BY ELIG CLIN: HCPCS | Performed by: INTERNAL MEDICINE

## 2024-05-13 PROCEDURE — 95251 CONT GLUC MNTR ANALYSIS I&R: CPT | Performed by: INTERNAL MEDICINE

## 2024-05-13 PROCEDURE — 4004F PT TOBACCO SCREEN RCVD TLK: CPT | Performed by: INTERNAL MEDICINE

## 2024-05-13 RX ORDER — BUMETANIDE 2 MG/1
4 TABLET ORAL 2 TIMES DAILY
COMMUNITY
Start: 2024-05-13

## 2024-05-13 RX ORDER — INSULIN GLARGINE 100 [IU]/ML
30 INJECTION, SOLUTION SUBCUTANEOUS DAILY
COMMUNITY
Start: 2024-05-13

## 2024-05-13 NOTE — PATIENT INSTRUCTIONS
1) Stay on the lantus 30 units daily and the novolog per the scale below:    Eating  Blood sugar  Insulin dose          Less than 90  Eat first, take 5 units       6 units    151-200  7 units      201-250  8 units     251-300  9 units      301-350  10 units      351-400  11 units  401-450  12 units  451-500  13 units  Over 500  14 units    Not Eating (to correct for high sugars at least 3 hours after last dose of novolog)  Blood sugar  Insulin dose          Less than 200  None    200-275  2 units    276-350  3 units      351-425  4 units     425-475  5 units   476-525       6 units  Over 525  7 units    2) If your sugar reads \"hi\" on the michele, please check it on your finger to decide how much novolog to take.  Do not take a dose of novolog more frequently than every 2.5 hours or you will stack your novolog and cause a low sugar.      3) I will contact Dr. Herron about the timing of your hysterectomy as I'm concerned that the continued pain will make it very difficult to ever get your A1c under 8% to have this surgery.    4) Your blood pressure is under good control and your cholesterol is well controlled.    5) We will just draw a Hemoglobin A1c in the office next time and you won't need any additional labs.

## 2024-05-13 NOTE — PROGRESS NOTES
20 mg daily but she couldn't afford this.  She has been started on crestor 40 mg daily by crossover clinic and LDL 64 in 10/14 and 55 in 1/15.  Was 37 in 3/16 so dose decreased to 20 mg daily and LDL 23 in 2/17.  Was changed to lipitor 1/2 of 80 mg daily when clinic couldn't get crestor any longer and LDL 37 in 9/17.  This was stopped during her hospital stay in 11/17 but was restarted by Dr. Quintana in 12/17 and 42 in 4/18.  Up to 94 in 8/18. Down to 70 in 1/19 and 88 in 9/19 and 78 in 12/19 and 61 in 7/20 and 54 in 1/21.  Changed to crestor 40 mg sometime in the spring of 2021 and LDL 86 in 11/21 and 54 in 3/22 and 52 in 6/22 and 53 in 1/23 and 33 in 5/23 and 55 in 3/24 and 46 in 4/24  - cont crestor 40 mg daily  - check lipids in fall 2024        4) Elevated LFTs (790.6): I told her previously that her LFTs were elevated likely due to ETOH intake so I advised her to cut back on this and her repeat LFTs were normal in 3/13 and 1/14 and 4/14 and 10/14 and 1/15 and 12/15 and 3/16.  AST up to 76 in 2/17 but back to normal in 5/17 and has been normal ever since so will follow this.      5) Borderline abnormal TFT: TSH 2.1 in 12/11 but up to 4.01 in 11/12 and 4.75 in 11/17 during 2 hospital stays.  Up to 5.45 in 1/19 with Dr. Quintana.  Repeat TSH 1.24 and FT4 0.95 and TPO ab 21 in 2/19 so held on any treatment.   TSH up to 3.36 in 9/19 but down to 2.61 in 12/19 and 1.59 in 7/19 and 1.48 in 7/21 and 2.88 in 11/21.  Up to 4.1 in 9/22.  TSH 2.97, TPO ab <9 and TG ab < 1.0 in 12/22.  TSH 2.7 in 1/23 and 3.1 in 5/23 and 1.66 in 7/23 and up to 8.33 in 3/24 but this was while she was hospitalized so will readdress at next visit  - check TSH in 7/24      Patient Instructions   1) Stay on the lantus 30 units daily and the novolog per the scale below:    Eating  Blood sugar  Insulin dose          Less than 90  Eat first, take 5 units       6 units    151-200  7 units      201-250  8 units     251-300  9

## 2024-05-15 ENCOUNTER — TELEPHONE (OUTPATIENT)
Age: 54
End: 2024-05-15

## 2024-05-15 NOTE — TELEPHONE ENCOUNTER
Pt LVM asking if Dr Easley could review her BMP results as Dr Castro is out of the office this week and his nurse is out with Covid. Pt states she is concerned about her potassium.

## 2024-05-15 NOTE — TELEPHONE ENCOUNTER
Patient notified of message per Dr. Schmitt and voiced understanding of what was read to them.   Ms Cheema thanked Dr Schmitt and staff for caring for her.

## 2024-05-15 NOTE — TELEPHONE ENCOUNTER
Please let her know her creatinine (kidney test) was 1.17 down from 1.28 at last check while in [facility] on 4/26/24.  Her potassium is normal at 4.0 and sodium is normal at 141 so everything looks great at this time.

## 2024-05-17 ENCOUNTER — TELEPHONE (OUTPATIENT)
Age: 54
End: 2024-05-17

## 2024-05-17 NOTE — TELEPHONE ENCOUNTER
Laurel,    Please let her know that I spoke with Dr. Herron about moving forward with her hysterectomy to help address her chronic pelvic pain knowing that it will be nearly impossible to keep her A1c under 8% given her fluctuating sugars from her pain and he is willing to move forward with the surgery and will be discussing her case with her PCP, Dr. Ferguson, and will be calling her to discuss this further.  I told Dr. Herron that I will be available to help with post-op management of her diabetes since he would plan on doing the surgery at [facility].  Thanks!

## 2024-05-22 LAB
ALBUMIN SERPL-MCNC: 4.3 G/DL (ref 3.8–4.9)
ALBUMIN/GLOB SERPL: 1.9 {RATIO} (ref 1.2–2.2)
ALP SERPL-CCNC: 91 IU/L (ref 44–121)
ALT SERPL-CCNC: 16 IU/L (ref 0–32)
AST SERPL-CCNC: 23 IU/L (ref 0–40)
BILIRUB SERPL-MCNC: <0.2 MG/DL (ref 0–1.2)
BUN SERPL-MCNC: 31 MG/DL (ref 6–24)
BUN/CREAT SERPL: 21 (ref 9–23)
CALCIUM SERPL-MCNC: 8.8 MG/DL (ref 8.7–10.2)
CHLORIDE SERPL-SCNC: 97 MMOL/L (ref 96–106)
CHOLEST SERPL-MCNC: 126 MG/DL (ref 100–199)
CO2 SERPL-SCNC: 24 MMOL/L (ref 20–29)
CREAT SERPL-MCNC: 1.51 MG/DL (ref 0.57–1)
EGFRCR SERPLBLD CKD-EPI 2021: 41 ML/MIN/1.73
GLOBULIN SER CALC-MCNC: 2.3 G/DL (ref 1.5–4.5)
GLUCOSE SERPL-MCNC: 274 MG/DL (ref 70–99)
HBA1C MFR BLD: 8.8 % (ref 4.8–5.6)
HDLC SERPL-MCNC: 47 MG/DL
IMP & REVIEW OF LAB RESULTS: NORMAL
LDLC SERPL CALC-MCNC: 51 MG/DL (ref 0–99)
Lab: NORMAL
POTASSIUM SERPL-SCNC: 4 MMOL/L (ref 3.5–5.2)
PROT SERPL-MCNC: 6.6 G/DL (ref 6–8.5)
REPORT: NORMAL
REPORT: NORMAL
SODIUM SERPL-SCNC: 138 MMOL/L (ref 134–144)
TRIGL SERPL-MCNC: 165 MG/DL (ref 0–149)
VLDLC SERPL CALC-MCNC: 28 MG/DL (ref 5–40)

## 2024-05-28 ENCOUNTER — APPOINTMENT (OUTPATIENT)
Facility: HOSPITAL | Age: 54
End: 2024-05-28
Payer: MEDICARE

## 2024-05-28 ENCOUNTER — HOSPITAL ENCOUNTER (EMERGENCY)
Facility: HOSPITAL | Age: 54
Discharge: HOME | End: 2024-05-28
Attending: STUDENT IN AN ORGANIZED HEALTH CARE EDUCATION/TRAINING PROGRAM
Payer: MEDICARE

## 2024-05-28 VITALS
WEIGHT: 156.09 LBS | OXYGEN SATURATION: 94 % | DIASTOLIC BLOOD PRESSURE: 65 MMHG | SYSTOLIC BLOOD PRESSURE: 108 MMHG | HEART RATE: 87 BPM | RESPIRATION RATE: 21 BRPM | BODY MASS INDEX: 25.97 KG/M2 | TEMPERATURE: 98.5 F

## 2024-05-28 DIAGNOSIS — I50.9 ACUTE ON CHRONIC CONGESTIVE HEART FAILURE, UNSPECIFIED HEART FAILURE TYPE (HCC): Primary | ICD-10-CM

## 2024-05-28 LAB
ALBUMIN SERPL-MCNC: 3.2 G/DL (ref 3.5–5)
ALBUMIN/GLOB SERPL: 1.1 (ref 1.1–2.2)
ALP SERPL-CCNC: 100 U/L (ref 45–117)
ALT SERPL-CCNC: 27 U/L (ref 12–78)
ANION GAP SERPL CALC-SCNC: 2 MMOL/L (ref 5–15)
AST SERPL-CCNC: 25 U/L (ref 15–37)
BASOPHILS # BLD: 0 K/UL (ref 0–0.1)
BASOPHILS NFR BLD: 0 % (ref 0–1)
BILIRUB SERPL-MCNC: 0.3 MG/DL (ref 0.2–1)
BUN SERPL-MCNC: 25 MG/DL (ref 6–20)
BUN/CREAT SERPL: 16 (ref 12–20)
CALCIUM SERPL-MCNC: 8.1 MG/DL (ref 8.5–10.1)
CHLORIDE SERPL-SCNC: 101 MMOL/L (ref 97–108)
CO2 SERPL-SCNC: 35 MMOL/L (ref 21–32)
COMMENT:: NORMAL
CREAT SERPL-MCNC: 1.54 MG/DL (ref 0.55–1.02)
DIFFERENTIAL METHOD BLD: ABNORMAL
EOSINOPHIL # BLD: 0.2 K/UL (ref 0–0.4)
EOSINOPHIL NFR BLD: 4 % (ref 0–7)
ERYTHROCYTE [DISTWIDTH] IN BLOOD BY AUTOMATED COUNT: 15.2 % (ref 11.5–14.5)
GLOBULIN SER CALC-MCNC: 2.8 G/DL (ref 2–4)
GLUCOSE SERPL-MCNC: 161 MG/DL (ref 65–100)
HCT VFR BLD AUTO: 32.5 % (ref 35–47)
HGB BLD-MCNC: 10.3 G/DL (ref 11.5–16)
IMM GRANULOCYTES # BLD AUTO: 0 K/UL (ref 0–0.04)
IMM GRANULOCYTES NFR BLD AUTO: 0 % (ref 0–0.5)
LYMPHOCYTES # BLD: 2.1 K/UL (ref 0.8–3.5)
LYMPHOCYTES NFR BLD: 37 % (ref 12–49)
MCH RBC QN AUTO: 28.2 PG (ref 26–34)
MCHC RBC AUTO-ENTMCNC: 31.7 G/DL (ref 30–36.5)
MCV RBC AUTO: 89 FL (ref 80–99)
MONOCYTES # BLD: 0.5 K/UL (ref 0–1)
MONOCYTES NFR BLD: 9 % (ref 5–13)
NEUTS SEG # BLD: 2.9 K/UL (ref 1.8–8)
NEUTS SEG NFR BLD: 50 % (ref 32–75)
NRBC # BLD: 0 K/UL (ref 0–0.01)
NRBC BLD-RTO: 0 PER 100 WBC
NT PRO BNP: 224 PG/ML
PLATELET # BLD AUTO: 205 K/UL (ref 150–400)
PMV BLD AUTO: 9.6 FL (ref 8.9–12.9)
POTASSIUM SERPL-SCNC: 3.4 MMOL/L (ref 3.5–5.1)
PROT SERPL-MCNC: 6 G/DL (ref 6.4–8.2)
RBC # BLD AUTO: 3.65 M/UL (ref 3.8–5.2)
SODIUM SERPL-SCNC: 138 MMOL/L (ref 136–145)
SPECIMEN HOLD: NORMAL
TROPONIN I SERPL HS-MCNC: 4 NG/L (ref 0–51)
WBC # BLD AUTO: 5.8 K/UL (ref 3.6–11)

## 2024-05-28 PROCEDURE — 83880 ASSAY OF NATRIURETIC PEPTIDE: CPT

## 2024-05-28 PROCEDURE — 85025 COMPLETE CBC W/AUTO DIFF WBC: CPT

## 2024-05-28 PROCEDURE — 96374 THER/PROPH/DIAG INJ IV PUSH: CPT

## 2024-05-28 PROCEDURE — 6370000000 HC RX 637 (ALT 250 FOR IP): Performed by: STUDENT IN AN ORGANIZED HEALTH CARE EDUCATION/TRAINING PROGRAM

## 2024-05-28 PROCEDURE — 71046 X-RAY EXAM CHEST 2 VIEWS: CPT

## 2024-05-28 PROCEDURE — 36415 COLL VENOUS BLD VENIPUNCTURE: CPT

## 2024-05-28 PROCEDURE — 99285 EMERGENCY DEPT VISIT HI MDM: CPT

## 2024-05-28 PROCEDURE — 6360000002 HC RX W HCPCS: Performed by: STUDENT IN AN ORGANIZED HEALTH CARE EDUCATION/TRAINING PROGRAM

## 2024-05-28 PROCEDURE — 80053 COMPREHEN METABOLIC PANEL: CPT

## 2024-05-28 PROCEDURE — 93005 ELECTROCARDIOGRAM TRACING: CPT | Performed by: EMERGENCY MEDICINE

## 2024-05-28 PROCEDURE — 84484 ASSAY OF TROPONIN QUANT: CPT

## 2024-05-28 RX ORDER — ACETAMINOPHEN 500 MG
1000 TABLET ORAL
Status: COMPLETED | OUTPATIENT
Start: 2024-05-28 | End: 2024-05-28

## 2024-05-28 RX ORDER — OXYCODONE HYDROCHLORIDE 5 MG/1
10 TABLET ORAL ONCE
Status: DISCONTINUED | OUTPATIENT
Start: 2024-05-28 | End: 2024-05-28 | Stop reason: HOSPADM

## 2024-05-28 RX ORDER — FUROSEMIDE 10 MG/ML
80 INJECTION INTRAMUSCULAR; INTRAVENOUS ONCE
Status: COMPLETED | OUTPATIENT
Start: 2024-05-28 | End: 2024-05-28

## 2024-05-28 RX ADMIN — FUROSEMIDE 80 MG: 10 INJECTION, SOLUTION INTRAMUSCULAR; INTRAVENOUS at 16:52

## 2024-05-28 RX ADMIN — ACETAMINOPHEN 1000 MG: 500 TABLET ORAL at 16:51

## 2024-05-28 ASSESSMENT — PAIN DESCRIPTION - LOCATION: LOCATION: ABDOMEN;KNEE

## 2024-05-28 ASSESSMENT — PAIN SCALES - GENERAL: PAINLEVEL_OUTOF10: 9

## 2024-05-28 NOTE — DISCHARGE INSTRUCTIONS
Please make a followup with your primary care physician, nephrologist, and cardiologist.  If you have any new or worsening concerning medical symptoms please return to the emergency department.    Please ask your outpatient doctors about the addition of metolazone to your bumetanide.

## 2024-05-28 NOTE — ED PROVIDER NOTES
[facility] EMERGENCY DEPT  EMERGENCY DEPARTMENT ENCOUNTER       Pt Name: Anette Cannon  MRN: [id number]  Birthdate 1970  Date of evaluation: 5/28/2024  Provider: Gato Franklin MD   PCP: Jair Vieira MD  Note Started: 5:57 PM EDT 5/28/24     CHIEF COMPLAINT       Chief Complaint   Patient presents with    Shortness of Breath     BIBEMS from home 15lb weight gain overnight, bilateral 4+ pitting edema, and SOB. 87% on room air prior to EMS arrival. Placed on 2lpm nasal cannula. Pt denies chest pain, n/v/d.      HISTORY OF PRESENT ILLNESS: 1 or more elements      History From: patient, History limited by: none     Anette Cannon is a 54 y.o. female w hx of DM, HTN, HF who presents to the ED with EMS.  She reports that she had gained approximately 15 pounds in the past 24 hours.  She was placed on 2LNC with EMS, but she stats she has chronic shortness of breath with no recent worsening.  She also reports that she has O2 via NC at home.  She has been taking her Bumex as prescribed by her outpatient nephrologist Dr. Castro.  She spoke to her nephrology office today and given her reported recent weight gain they recommended evaluation in the ED.  She denies any chest pain, nausea, or vomiting.  She feels her swelling is worse because she has been on her feet more often recently.    Please See [facility] for Additional Details of the HPI/PMH  Nursing Notes were all reviewed and agreed with or any disagreements were addressed in the HPI.     REVIEW OF SYSTEMS        Positives and Pertinent negatives as per HPI.    PAST HISTORY     Past Medical History:  Past Medical History:   Diagnosis Date    Achilles tendon rupture     along with torn right patellar/femoral ligament    Arthritis     rt. foot    Chronic kidney disease     Chronic pain     abdominal pain    Diabetes (HCC)     IDDM on insulin pump and sensor    DM type 1 (diabetes mellitus, type 1) ([facility])     age 22    Endometriosis     s/p ex-lap 4x    Gastric ulcer

## 2024-05-29 LAB
EKG ATRIAL RATE: 88 BPM
EKG DIAGNOSIS: NORMAL
EKG P AXIS: 49 DEGREES
EKG P-R INTERVAL: 168 MS
EKG Q-T INTERVAL: 390 MS
EKG QRS DURATION: 76 MS
EKG QTC CALCULATION (BAZETT): 471 MS
EKG R AXIS: -16 DEGREES
EKG T AXIS: 38 DEGREES
EKG VENTRICULAR RATE: 88 BPM

## 2024-06-17 ENCOUNTER — TELEPHONE (OUTPATIENT)
Age: 54
End: 2024-06-17

## 2024-06-17 NOTE — TELEPHONE ENCOUNTER
Pt LVM asking if Dr Schmitt could review her recent lab result due to Dr Castro being out of the office this week. Labs printed and placed on Dr Schmitt's desk.

## 2024-06-17 NOTE — TELEPHONE ENCOUNTER
Please let her know her sodium level is still normal at 135 but slightly lower than 138 at last check.  Your creatinine (kidney test) is elevated at 1.24 but improved from 1.51 at last check so overall everything looks good.

## 2024-06-26 ENCOUNTER — HOSPITAL ENCOUNTER (INPATIENT)
Facility: HOSPITAL | Age: 54
LOS: 6 days | Discharge: HOME | DRG: 291 | End: 2024-07-02
Attending: EMERGENCY MEDICINE | Admitting: STUDENT IN AN ORGANIZED HEALTH CARE EDUCATION/TRAINING PROGRAM
Payer: MEDICARE

## 2024-06-26 ENCOUNTER — APPOINTMENT (OUTPATIENT)
Facility: HOSPITAL | Age: 54
DRG: 291 | End: 2024-06-26
Payer: MEDICARE

## 2024-06-26 DIAGNOSIS — I50.9 ACUTE ON CHRONIC CONGESTIVE HEART FAILURE, UNSPECIFIED HEART FAILURE TYPE (HCC): ICD-10-CM

## 2024-06-26 DIAGNOSIS — J96.01 ACUTE RESPIRATORY FAILURE WITH HYPOXIA (HCC): Primary | ICD-10-CM

## 2024-06-26 LAB
ALBUMIN SERPL-MCNC: 3.7 G/DL (ref 3.5–5)
ALBUMIN/GLOB SERPL: 1.2 (ref 1.1–2.2)
ALP SERPL-CCNC: 106 U/L (ref 45–117)
ALT SERPL-CCNC: 22 U/L (ref 12–78)
ANION GAP SERPL CALC-SCNC: 7 MMOL/L (ref 5–15)
AST SERPL-CCNC: 26 U/L (ref 15–37)
BASOPHILS # BLD: 0 K/UL (ref 0–0.1)
BASOPHILS NFR BLD: 1 % (ref 0–1)
BILIRUB SERPL-MCNC: 0.3 MG/DL (ref 0.2–1)
BUN SERPL-MCNC: 30 MG/DL (ref 6–20)
BUN/CREAT SERPL: 17 (ref 12–20)
CALCIUM SERPL-MCNC: 8.5 MG/DL (ref 8.5–10.1)
CHLORIDE SERPL-SCNC: 96 MMOL/L (ref 97–108)
CO2 SERPL-SCNC: 33 MMOL/L (ref 21–32)
CREAT SERPL-MCNC: 1.75 MG/DL (ref 0.55–1.02)
DIFFERENTIAL METHOD BLD: ABNORMAL
ECHO BSA: 2 M2
EOSINOPHIL # BLD: 0.3 K/UL (ref 0–0.4)
EOSINOPHIL NFR BLD: 4 % (ref 0–7)
ERYTHROCYTE [DISTWIDTH] IN BLOOD BY AUTOMATED COUNT: 15.2 % (ref 11.5–14.5)
GLOBULIN SER CALC-MCNC: 3.1 G/DL (ref 2–4)
GLUCOSE BLD STRIP.AUTO-MCNC: 103 MG/DL (ref 65–117)
GLUCOSE BLD STRIP.AUTO-MCNC: 178 MG/DL (ref 65–117)
GLUCOSE BLD STRIP.AUTO-MCNC: 37 MG/DL (ref 65–117)
GLUCOSE BLD STRIP.AUTO-MCNC: 37 MG/DL (ref 65–117)
GLUCOSE BLD STRIP.AUTO-MCNC: 46 MG/DL (ref 65–117)
GLUCOSE BLD STRIP.AUTO-MCNC: 47 MG/DL (ref 65–117)
GLUCOSE BLD STRIP.AUTO-MCNC: 59 MG/DL (ref 65–117)
GLUCOSE BLD STRIP.AUTO-MCNC: 96 MG/DL (ref 65–117)
GLUCOSE SERPL-MCNC: 35 MG/DL (ref 65–100)
HCT VFR BLD AUTO: 34.5 % (ref 35–47)
HGB BLD-MCNC: 11.2 G/DL (ref 11.5–16)
IMM GRANULOCYTES # BLD AUTO: 0 K/UL (ref 0–0.04)
IMM GRANULOCYTES NFR BLD AUTO: 0 % (ref 0–0.5)
LYMPHOCYTES # BLD: 3.4 K/UL (ref 0.8–3.5)
LYMPHOCYTES NFR BLD: 42 % (ref 12–49)
MCH RBC QN AUTO: 27.9 PG (ref 26–34)
MCHC RBC AUTO-ENTMCNC: 32.5 G/DL (ref 30–36.5)
MCV RBC AUTO: 86 FL (ref 80–99)
MONOCYTES # BLD: 0.7 K/UL (ref 0–1)
MONOCYTES NFR BLD: 8 % (ref 5–13)
NEUTS SEG # BLD: 3.7 K/UL (ref 1.8–8)
NEUTS SEG NFR BLD: 45 % (ref 32–75)
NRBC # BLD: 0 K/UL (ref 0–0.01)
NRBC BLD-RTO: 0 PER 100 WBC
NT PRO BNP: 260 PG/ML
PLATELET # BLD AUTO: 262 K/UL (ref 150–400)
PMV BLD AUTO: 9.9 FL (ref 8.9–12.9)
POTASSIUM SERPL-SCNC: 3.5 MMOL/L (ref 3.5–5.1)
PROT SERPL-MCNC: 6.8 G/DL (ref 6.4–8.2)
RBC # BLD AUTO: 4.01 M/UL (ref 3.8–5.2)
SERVICE CMNT-IMP: ABNORMAL
SERVICE CMNT-IMP: NORMAL
SERVICE CMNT-IMP: NORMAL
SODIUM SERPL-SCNC: 136 MMOL/L (ref 136–145)
TROPONIN I SERPL HS-MCNC: 5 NG/L (ref 0–51)
WBC # BLD AUTO: 8.2 K/UL (ref 3.6–11)

## 2024-06-26 PROCEDURE — 36415 COLL VENOUS BLD VENIPUNCTURE: CPT

## 2024-06-26 PROCEDURE — 99285 EMERGENCY DEPT VISIT HI MDM: CPT

## 2024-06-26 PROCEDURE — 93970 EXTREMITY STUDY: CPT

## 2024-06-26 PROCEDURE — 83880 ASSAY OF NATRIURETIC PEPTIDE: CPT

## 2024-06-26 PROCEDURE — 82962 GLUCOSE BLOOD TEST: CPT

## 2024-06-26 PROCEDURE — 2580000003 HC RX 258: Performed by: STUDENT IN AN ORGANIZED HEALTH CARE EDUCATION/TRAINING PROGRAM

## 2024-06-26 PROCEDURE — 96375 TX/PRO/DX INJ NEW DRUG ADDON: CPT

## 2024-06-26 PROCEDURE — 85025 COMPLETE CBC W/AUTO DIFF WBC: CPT

## 2024-06-26 PROCEDURE — 6370000000 HC RX 637 (ALT 250 FOR IP): Performed by: STUDENT IN AN ORGANIZED HEALTH CARE EDUCATION/TRAINING PROGRAM

## 2024-06-26 PROCEDURE — 93005 ELECTROCARDIOGRAM TRACING: CPT | Performed by: EMERGENCY MEDICINE

## 2024-06-26 PROCEDURE — 6360000002 HC RX W HCPCS: Performed by: EMERGENCY MEDICINE

## 2024-06-26 PROCEDURE — 2060000000 HC ICU INTERMEDIATE R&B

## 2024-06-26 PROCEDURE — 6370000000 HC RX 637 (ALT 250 FOR IP): Performed by: EMERGENCY MEDICINE

## 2024-06-26 PROCEDURE — 71045 X-RAY EXAM CHEST 1 VIEW: CPT

## 2024-06-26 PROCEDURE — 6360000002 HC RX W HCPCS: Performed by: STUDENT IN AN ORGANIZED HEALTH CARE EDUCATION/TRAINING PROGRAM

## 2024-06-26 PROCEDURE — 84484 ASSAY OF TROPONIN QUANT: CPT

## 2024-06-26 PROCEDURE — 80053 COMPREHEN METABOLIC PANEL: CPT

## 2024-06-26 PROCEDURE — 96374 THER/PROPH/DIAG INJ IV PUSH: CPT

## 2024-06-26 RX ORDER — ASPIRIN 81 MG/1
81 TABLET ORAL DAILY
Status: DISCONTINUED | OUTPATIENT
Start: 2024-06-26 | End: 2024-07-02 | Stop reason: HOSPADM

## 2024-06-26 RX ORDER — NALOXONE HYDROCHLORIDE 4 MG/.1ML
1 SPRAY NASAL PRN
Status: DISCONTINUED | OUTPATIENT
Start: 2024-06-26 | End: 2024-06-26

## 2024-06-26 RX ORDER — ONDANSETRON 2 MG/ML
4 INJECTION INTRAMUSCULAR; INTRAVENOUS EVERY 6 HOURS PRN
Status: DISCONTINUED | OUTPATIENT
Start: 2024-06-26 | End: 2024-07-02 | Stop reason: HOSPADM

## 2024-06-26 RX ORDER — SODIUM CHLORIDE 0.9 % (FLUSH) 0.9 %
5-40 SYRINGE (ML) INJECTION EVERY 12 HOURS SCHEDULED
Status: DISCONTINUED | OUTPATIENT
Start: 2024-06-26 | End: 2024-07-02 | Stop reason: HOSPADM

## 2024-06-26 RX ORDER — BUMETANIDE 0.25 MG/ML
2 INJECTION INTRAMUSCULAR; INTRAVENOUS 2 TIMES DAILY
Status: DISCONTINUED | OUTPATIENT
Start: 2024-06-27 | End: 2024-06-28

## 2024-06-26 RX ORDER — CITALOPRAM 20 MG/1
40 TABLET ORAL DAILY
Status: DISCONTINUED | OUTPATIENT
Start: 2024-06-26 | End: 2024-07-02 | Stop reason: HOSPADM

## 2024-06-26 RX ORDER — MULTIVITAMIN WITH IRON
1 TABLET ORAL DAILY
Status: DISCONTINUED | OUTPATIENT
Start: 2024-06-26 | End: 2024-07-02 | Stop reason: HOSPADM

## 2024-06-26 RX ORDER — POLYETHYLENE GLYCOL 3350 17 G/17G
17 POWDER, FOR SOLUTION ORAL DAILY PRN
Status: DISCONTINUED | OUTPATIENT
Start: 2024-06-26 | End: 2024-07-02 | Stop reason: HOSPADM

## 2024-06-26 RX ORDER — BUMETANIDE 0.25 MG/ML
1 INJECTION INTRAMUSCULAR; INTRAVENOUS ONCE
Status: COMPLETED | OUTPATIENT
Start: 2024-06-26 | End: 2024-06-26

## 2024-06-26 RX ORDER — ACETAMINOPHEN 650 MG/1
650 SUPPOSITORY RECTAL EVERY 6 HOURS PRN
Status: DISCONTINUED | OUTPATIENT
Start: 2024-06-26 | End: 2024-07-02 | Stop reason: HOSPADM

## 2024-06-26 RX ORDER — VITAMIN B COMPLEX
5000 TABLET ORAL DAILY
Status: DISCONTINUED | OUTPATIENT
Start: 2024-06-26 | End: 2024-07-02 | Stop reason: HOSPADM

## 2024-06-26 RX ORDER — DEXTROSE MONOHYDRATE 100 MG/ML
INJECTION, SOLUTION INTRAVENOUS CONTINUOUS PRN
Status: DISCONTINUED | OUTPATIENT
Start: 2024-06-26 | End: 2024-07-02 | Stop reason: HOSPADM

## 2024-06-26 RX ORDER — DEXTROSE MONOHYDRATE 100 MG/ML
INJECTION, SOLUTION INTRAVENOUS CONTINUOUS PRN
Status: DISCONTINUED | OUTPATIENT
Start: 2024-06-26 | End: 2024-06-26

## 2024-06-26 RX ORDER — METOCLOPRAMIDE 10 MG/1
5 TABLET ORAL 2 TIMES DAILY
Status: DISCONTINUED | OUTPATIENT
Start: 2024-06-26 | End: 2024-07-02 | Stop reason: HOSPADM

## 2024-06-26 RX ORDER — ROSUVASTATIN CALCIUM 40 MG/1
40 TABLET, COATED ORAL DAILY
Status: DISCONTINUED | OUTPATIENT
Start: 2024-06-26 | End: 2024-07-02 | Stop reason: HOSPADM

## 2024-06-26 RX ORDER — SODIUM CHLORIDE 0.9 % (FLUSH) 0.9 %
5-40 SYRINGE (ML) INJECTION PRN
Status: DISCONTINUED | OUTPATIENT
Start: 2024-06-26 | End: 2024-07-02 | Stop reason: HOSPADM

## 2024-06-26 RX ORDER — GLUCAGON 1 MG/ML
1 KIT INJECTION PRN
Status: DISCONTINUED | OUTPATIENT
Start: 2024-06-26 | End: 2024-07-02 | Stop reason: HOSPADM

## 2024-06-26 RX ORDER — ACETAMINOPHEN 325 MG/1
650 TABLET ORAL EVERY 4 HOURS PRN
Status: DISCONTINUED | OUTPATIENT
Start: 2024-06-26 | End: 2024-06-26

## 2024-06-26 RX ORDER — DULOXETIN HYDROCHLORIDE 20 MG/1
20 CAPSULE, DELAYED RELEASE ORAL DAILY
Status: DISCONTINUED | OUTPATIENT
Start: 2024-06-26 | End: 2024-07-02 | Stop reason: HOSPADM

## 2024-06-26 RX ORDER — NICOTINE 21 MG/24HR
1 PATCH, TRANSDERMAL 24 HOURS TRANSDERMAL DAILY
Status: DISCONTINUED | OUTPATIENT
Start: 2024-06-27 | End: 2024-07-02 | Stop reason: HOSPADM

## 2024-06-26 RX ORDER — ACETAMINOPHEN 325 MG/1
650 TABLET ORAL EVERY 6 HOURS PRN
Status: DISCONTINUED | OUTPATIENT
Start: 2024-06-26 | End: 2024-07-02 | Stop reason: HOSPADM

## 2024-06-26 RX ORDER — GLUCAGON 1 MG/ML
1 KIT INJECTION PRN
Status: DISCONTINUED | OUTPATIENT
Start: 2024-06-26 | End: 2024-06-26

## 2024-06-26 RX ORDER — ENOXAPARIN SODIUM 100 MG/ML
40 INJECTION SUBCUTANEOUS DAILY
Status: DISCONTINUED | OUTPATIENT
Start: 2024-06-26 | End: 2024-07-02 | Stop reason: HOSPADM

## 2024-06-26 RX ORDER — SODIUM CHLORIDE 9 MG/ML
INJECTION, SOLUTION INTRAVENOUS PRN
Status: DISCONTINUED | OUTPATIENT
Start: 2024-06-26 | End: 2024-07-02 | Stop reason: HOSPADM

## 2024-06-26 RX ORDER — PANTOPRAZOLE SODIUM 40 MG/1
40 TABLET, DELAYED RELEASE ORAL
Status: DISCONTINUED | OUTPATIENT
Start: 2024-06-27 | End: 2024-07-02 | Stop reason: HOSPADM

## 2024-06-26 RX ORDER — MORPHINE SULFATE 30 MG/1
90 TABLET, FILM COATED, EXTENDED RELEASE ORAL 2 TIMES DAILY PRN
Status: DISCONTINUED | OUTPATIENT
Start: 2024-06-26 | End: 2024-06-27

## 2024-06-26 RX ORDER — MORPHINE SULFATE 4 MG/ML
4 INJECTION, SOLUTION INTRAMUSCULAR; INTRAVENOUS
Status: COMPLETED | OUTPATIENT
Start: 2024-06-26 | End: 2024-06-26

## 2024-06-26 RX ORDER — ONDANSETRON 4 MG/1
4 TABLET, ORALLY DISINTEGRATING ORAL EVERY 8 HOURS PRN
Status: DISCONTINUED | OUTPATIENT
Start: 2024-06-26 | End: 2024-07-02 | Stop reason: HOSPADM

## 2024-06-26 RX ORDER — ALPRAZOLAM 0.5 MG/1
2 TABLET ORAL 2 TIMES DAILY PRN
Status: DISCONTINUED | OUTPATIENT
Start: 2024-06-26 | End: 2024-06-28

## 2024-06-26 RX ADMIN — MORPHINE SULFATE 90 MG: 30 TABLET, FILM COATED, EXTENDED RELEASE ORAL at 22:31

## 2024-06-26 RX ADMIN — BUMETANIDE 1 MG: 0.25 INJECTION INTRAMUSCULAR; INTRAVENOUS at 18:01

## 2024-06-26 RX ADMIN — ASPIRIN 81 MG: 81 TABLET, COATED ORAL at 20:47

## 2024-06-26 RX ADMIN — SODIUM CHLORIDE, PRESERVATIVE FREE 10 ML: 5 INJECTION INTRAVENOUS at 20:51

## 2024-06-26 RX ADMIN — METOCLOPRAMIDE 5 MG: 10 TABLET ORAL at 22:32

## 2024-06-26 RX ADMIN — MORPHINE SULFATE 4 MG: 4 INJECTION INTRAVENOUS at 18:07

## 2024-06-26 RX ADMIN — BUSPIRONE HYDROCHLORIDE 15 MG: 5 TABLET ORAL at 22:31

## 2024-06-26 RX ADMIN — Medication 16 G: at 20:23

## 2024-06-26 RX ADMIN — ENOXAPARIN SODIUM 40 MG: 100 INJECTION SUBCUTANEOUS at 22:36

## 2024-06-26 ASSESSMENT — PAIN DESCRIPTION - DESCRIPTORS
DESCRIPTORS: ACHING;SHARP;STABBING
DESCRIPTORS: ACHING;SHARP;STABBING

## 2024-06-26 ASSESSMENT — PAIN SCALES - GENERAL
PAINLEVEL_OUTOF10: 8
PAINLEVEL_OUTOF10: 3
PAINLEVEL_OUTOF10: 8

## 2024-06-26 ASSESSMENT — PAIN DESCRIPTION - LOCATION
LOCATION: LEG
LOCATION: LEG;ABDOMEN
LOCATION: LEG;ABDOMEN
LOCATION: LEG
LOCATION: LEG;ABDOMEN

## 2024-06-26 ASSESSMENT — PAIN DESCRIPTION - ORIENTATION
ORIENTATION: LEFT;RIGHT
ORIENTATION: RIGHT;LEFT;MID
ORIENTATION: RIGHT;LEFT;MID
ORIENTATION: LEFT;RIGHT

## 2024-06-26 ASSESSMENT — PAIN DESCRIPTION - PAIN TYPE: TYPE: CHRONIC PAIN

## 2024-06-26 ASSESSMENT — PAIN - FUNCTIONAL ASSESSMENT
PAIN_FUNCTIONAL_ASSESSMENT: 0-10
PAIN_FUNCTIONAL_ASSESSMENT: ACTIVITIES ARE NOT PREVENTED
PAIN_FUNCTIONAL_ASSESSMENT: ACTIVITIES ARE NOT PREVENTED

## 2024-06-26 NOTE — ED NOTES
Checked POC gluccose. POC glucose 47. Provided pt full sugar pepsi and crackers at this time. Pt is A&O x4. MD Jacobo aware at this time.

## 2024-06-26 NOTE — ED PROVIDER NOTES
[facility] CARDIOPULMON[facility] C[facility]  EMERGENCY DEPARTMENT ENCOUNTER       Pt Name: Anette Cannon  MRN: [id number]  Birthdate 1970  Date of evaluation: 6/26/2024  Provider: Rolo Colon MD   PCP: Jair Vieira MD  Note Started: 11:57 PM 6/26/24     CHIEF COMPLAINT       Chief Complaint   Patient presents with    Shortness of Breath     Pt BIBEMS from home C/O BLE that started 3 days ago and SOB that started today. RA 90%. EMS placed her on 2L NC. PMH CHF.    Leg Swelling        HISTORY OF PRESENT ILLNESS: 1 or more elements      History From: Patient, EMS, history limited by: No limitations     Anette Cannon is a 54 y.o. female with history of CHF, chronic abdominal pain, diabetes, CKD who presents with chief complaint of shortness of breath, bilateral leg swelling.  She does have worsening dyspnea on exertion and lower extremity edema over the past 1 week.  Endorses weight gain of about 20 pounds despite being compliant with Bumex twice daily.  Denies chest pain, fevers, chills.     Nursing Notes were all reviewed and agreed with or any disagreements were addressed in the HPI.     REVIEW OF SYSTEMS        Positives and Pertinent negatives as per HPI.    PAST HISTORY     Past Medical History:  Past Medical History:   Diagnosis Date    Achilles tendon rupture     along with torn right patellar/femoral ligament    Arthritis     rt. foot    Chronic kidney disease     Chronic pain     abdominal pain    Diabetes (HCC)     IDDM on insulin pump and sensor    DM type 1 (diabetes mellitus, type 1) (HCC)     age 22    Endometriosis     s/p ex-lap 4x    Gastric ulcer     GERD (gastroesophageal reflux disease)     Herpes     Other and unspecified hyperlipidemia     Panic attacks     Psychiatric disorder     anxiety, panic attacks    PTSD (post-traumatic stress disorder)     Unspecified essential hypertension        Past Surgical History:  Past Surgical History:   Procedure Laterality Date    CARDIAC PROCEDURE N/A 8/8/2023

## 2024-06-27 ENCOUNTER — TELEPHONE (OUTPATIENT)
Age: 54
End: 2024-06-27

## 2024-06-27 LAB
ANION GAP SERPL CALC-SCNC: 5 MMOL/L (ref 5–15)
BUN SERPL-MCNC: 28 MG/DL (ref 6–20)
BUN/CREAT SERPL: 17 (ref 12–20)
CALCIUM SERPL-MCNC: 8.1 MG/DL (ref 8.5–10.1)
CHLORIDE SERPL-SCNC: 98 MMOL/L (ref 97–108)
CO2 SERPL-SCNC: 32 MMOL/L (ref 21–32)
CREAT SERPL-MCNC: 1.66 MG/DL (ref 0.55–1.02)
EKG ATRIAL RATE: 77 BPM
EKG DIAGNOSIS: NORMAL
EKG P AXIS: 70 DEGREES
EKG P-R INTERVAL: 176 MS
EKG Q-T INTERVAL: 406 MS
EKG QRS DURATION: 86 MS
EKG QTC CALCULATION (BAZETT): 459 MS
EKG R AXIS: 96 DEGREES
EKG T AXIS: 74 DEGREES
EKG VENTRICULAR RATE: 77 BPM
GLUCOSE BLD STRIP.AUTO-MCNC: 248 MG/DL (ref 65–117)
GLUCOSE BLD STRIP.AUTO-MCNC: 331 MG/DL (ref 65–117)
GLUCOSE BLD STRIP.AUTO-MCNC: 355 MG/DL (ref 65–117)
GLUCOSE BLD STRIP.AUTO-MCNC: 67 MG/DL (ref 65–117)
GLUCOSE BLD STRIP.AUTO-MCNC: 75 MG/DL (ref 65–117)
GLUCOSE BLD STRIP.AUTO-MCNC: 80 MG/DL (ref 65–117)
GLUCOSE BLD STRIP.AUTO-MCNC: 89 MG/DL (ref 65–117)
GLUCOSE SERPL-MCNC: 90 MG/DL (ref 65–100)
MAGNESIUM SERPL-MCNC: 2.3 MG/DL (ref 1.6–2.4)
POTASSIUM SERPL-SCNC: 3.4 MMOL/L (ref 3.5–5.1)
SERVICE CMNT-IMP: ABNORMAL
SERVICE CMNT-IMP: NORMAL
SODIUM SERPL-SCNC: 135 MMOL/L (ref 136–145)

## 2024-06-27 PROCEDURE — 6370000000 HC RX 637 (ALT 250 FOR IP): Performed by: STUDENT IN AN ORGANIZED HEALTH CARE EDUCATION/TRAINING PROGRAM

## 2024-06-27 PROCEDURE — 80048 BASIC METABOLIC PNL TOTAL CA: CPT

## 2024-06-27 PROCEDURE — 82962 GLUCOSE BLOOD TEST: CPT

## 2024-06-27 PROCEDURE — 2700000000 HC OXYGEN THERAPY PER DAY

## 2024-06-27 PROCEDURE — 2580000003 HC RX 258: Performed by: STUDENT IN AN ORGANIZED HEALTH CARE EDUCATION/TRAINING PROGRAM

## 2024-06-27 PROCEDURE — 83735 ASSAY OF MAGNESIUM: CPT

## 2024-06-27 PROCEDURE — 6360000002 HC RX W HCPCS: Performed by: STUDENT IN AN ORGANIZED HEALTH CARE EDUCATION/TRAINING PROGRAM

## 2024-06-27 PROCEDURE — 6370000000 HC RX 637 (ALT 250 FOR IP): Performed by: INTERNAL MEDICINE

## 2024-06-27 PROCEDURE — 2060000000 HC ICU INTERMEDIATE R&B

## 2024-06-27 PROCEDURE — 36415 COLL VENOUS BLD VENIPUNCTURE: CPT

## 2024-06-27 RX ORDER — INSULIN GLARGINE 100 [IU]/ML
15 INJECTION, SOLUTION SUBCUTANEOUS DAILY
Status: DISCONTINUED | OUTPATIENT
Start: 2024-06-27 | End: 2024-06-28

## 2024-06-27 RX ORDER — INSULIN LISPRO 100 [IU]/ML
0-4 INJECTION, SOLUTION INTRAVENOUS; SUBCUTANEOUS
Status: DISCONTINUED | OUTPATIENT
Start: 2024-06-27 | End: 2024-06-29

## 2024-06-27 RX ORDER — INSULIN LISPRO 100 [IU]/ML
0-4 INJECTION, SOLUTION INTRAVENOUS; SUBCUTANEOUS NIGHTLY
Status: DISCONTINUED | OUTPATIENT
Start: 2024-06-27 | End: 2024-07-02 | Stop reason: HOSPADM

## 2024-06-27 RX ORDER — MORPHINE SULFATE 30 MG/1
90 TABLET, FILM COATED, EXTENDED RELEASE ORAL EVERY 12 HOURS SCHEDULED
Status: DISCONTINUED | OUTPATIENT
Start: 2024-06-28 | End: 2024-06-27

## 2024-06-27 RX ORDER — INSULIN LISPRO 100 [IU]/ML
5 INJECTION, SOLUTION INTRAVENOUS; SUBCUTANEOUS
Status: DISCONTINUED | OUTPATIENT
Start: 2024-06-27 | End: 2024-06-28

## 2024-06-27 RX ORDER — MORPHINE SULFATE 30 MG/1
90 TABLET, FILM COATED, EXTENDED RELEASE ORAL EVERY 12 HOURS SCHEDULED
Status: DISCONTINUED | OUTPATIENT
Start: 2024-06-27 | End: 2024-07-02 | Stop reason: HOSPADM

## 2024-06-27 RX ORDER — OXYCODONE HYDROCHLORIDE 5 MG/1
2.5 TABLET ORAL EVERY 6 HOURS PRN
Status: DISCONTINUED | OUTPATIENT
Start: 2024-06-27 | End: 2024-06-29

## 2024-06-27 RX ORDER — MORPHINE SULFATE 100 MG/1
100 TABLET ORAL EVERY 12 HOURS SCHEDULED
Status: DISCONTINUED | OUTPATIENT
Start: 2024-06-28 | End: 2024-06-27

## 2024-06-27 RX ADMIN — BUSPIRONE HYDROCHLORIDE 15 MG: 5 TABLET ORAL at 20:20

## 2024-06-27 RX ADMIN — SODIUM CHLORIDE, PRESERVATIVE FREE 10 ML: 5 INJECTION INTRAVENOUS at 10:23

## 2024-06-27 RX ADMIN — BUSPIRONE HYDROCHLORIDE 15 MG: 5 TABLET ORAL at 15:02

## 2024-06-27 RX ADMIN — BUSPIRONE HYDROCHLORIDE 15 MG: 5 TABLET ORAL at 10:17

## 2024-06-27 RX ADMIN — DULOXETINE HYDROCHLORIDE 20 MG: 20 CAPSULE, DELAYED RELEASE ORAL at 10:09

## 2024-06-27 RX ADMIN — ENOXAPARIN SODIUM 40 MG: 100 INJECTION SUBCUTANEOUS at 10:20

## 2024-06-27 RX ADMIN — BUMETANIDE 2 MG: 0.25 INJECTION INTRAMUSCULAR; INTRAVENOUS at 10:18

## 2024-06-27 RX ADMIN — Medication 5000 UNITS: at 10:08

## 2024-06-27 RX ADMIN — METOCLOPRAMIDE 5 MG: 10 TABLET ORAL at 20:20

## 2024-06-27 RX ADMIN — INSULIN LISPRO 4 UNITS: 100 INJECTION, SOLUTION INTRAVENOUS; SUBCUTANEOUS at 17:20

## 2024-06-27 RX ADMIN — PANTOPRAZOLE SODIUM 40 MG: 40 TABLET, DELAYED RELEASE ORAL at 06:29

## 2024-06-27 RX ADMIN — OXYCODONE 2.5 MG: 5 TABLET ORAL at 20:20

## 2024-06-27 RX ADMIN — ACETAMINOPHEN 650 MG: 325 TABLET ORAL at 01:35

## 2024-06-27 RX ADMIN — MORPHINE SULFATE 90 MG: 30 TABLET, FILM COATED, EXTENDED RELEASE ORAL at 12:07

## 2024-06-27 RX ADMIN — ASPIRIN 81 MG: 81 TABLET, COATED ORAL at 10:09

## 2024-06-27 RX ADMIN — ALPRAZOLAM 2 MG: 0.5 TABLET ORAL at 01:35

## 2024-06-27 RX ADMIN — BREXPIPRAZOLE 2 MG: 1 TABLET ORAL at 10:09

## 2024-06-27 RX ADMIN — INSULIN GLARGINE 15 UNITS: 100 INJECTION, SOLUTION SUBCUTANEOUS at 15:01

## 2024-06-27 RX ADMIN — THERA TABS 1 TABLET: TAB at 10:11

## 2024-06-27 RX ADMIN — INSULIN LISPRO 5 UNITS: 100 INJECTION, SOLUTION INTRAVENOUS; SUBCUTANEOUS at 17:20

## 2024-06-27 RX ADMIN — CITALOPRAM HYDROBROMIDE 40 MG: 20 TABLET ORAL at 10:10

## 2024-06-27 RX ADMIN — ALPRAZOLAM 2 MG: 0.5 TABLET ORAL at 21:21

## 2024-06-27 RX ADMIN — METOCLOPRAMIDE 5 MG: 10 TABLET ORAL at 10:21

## 2024-06-27 ASSESSMENT — PAIN SCALES - GENERAL
PAINLEVEL_OUTOF10: 5
PAINLEVEL_OUTOF10: 3
PAINLEVEL_OUTOF10: 0
PAINLEVEL_OUTOF10: 7

## 2024-06-27 ASSESSMENT — PAIN DESCRIPTION - LOCATION
LOCATION: LEG
LOCATION: LEG;ABDOMEN

## 2024-06-27 NOTE — CARDIO/PULMONARY
Cardiac Rehab: Consult received and chart reviewed. Echo on 3/14/24 reports EF 55-60% therefore patient does not meet the following criteria and is not a candidate for cardiac rehab.    EF ?35% at time of enrollment  Stable class 2-4 NYHA heart failure  Optimal medical therapy for > 6 weeks  No major hospitalizations within the last 6 weeks  No major hospitalizations within the next 6 weeks    LYLY LAWTON RN

## 2024-06-27 NOTE — H&P
slurred speech. Tremor to left arm which patient states is chronic.        LAB DATA REVIEWED:    Recent Results (from the past 12 hour(s))   POCT Glucose    Collection Time: 06/26/24  4:10 PM   Result Value Ref Range    POC Glucose 37 (LL) 65 - 117 mg/dL    Performed by: Raul BRAXTON RN    POCT Glucose    Collection Time: 06/26/24  4:11 PM   Result Value Ref Range    POC Glucose 37 (LL) 65 - 117 mg/dL    Performed by: Raul BRAXTON RN    EKG 12 Lead (SOB)    Collection Time: 06/26/24  4:25 PM   Result Value Ref Range    Ventricular Rate 77 BPM    Atrial Rate 77 BPM    P-R Interval 176 ms    QRS Duration 86 ms    Q-T Interval 406 ms    QTc Calculation (Bazett) 459 ms    P Axis 70 degrees    R Axis 96 degrees    T Axis 74 degrees    Diagnosis       Normal sinus rhythm  Rightward axis  Septal infarct , age undetermined  When compared with ECG of 28-MAY-2024 15:59,  Questionable change in QRS axis     CBC with Auto Differential    Collection Time: 06/26/24  4:31 PM   Result Value Ref Range    WBC 8.2 3.6 - 11.0 K/uL    RBC 4.01 3.80 - 5.20 M/uL    Hemoglobin 11.2 (L) 11.5 - 16.0 g/dL    Hematocrit 34.5 (L) 35.0 - 47.0 %    MCV 86.0 80.0 - 99.0 FL    MCH 27.9 26.0 - 34.0 PG    MCHC 32.5 30.0 - 36.5 g/dL    RDW 15.2 (H) 11.5 - 14.5 %    Platelets 262 150 - 400 K/uL    MPV 9.9 8.9 - 12.9 FL    Nucleated RBCs 0.0 0  WBC    nRBC 0.00 0.00 - 0.01 K/uL    Neutrophils % 45 32 - 75 %    Lymphocytes % 42 12 - 49 %    Monocytes % 8 5 - 13 %    Eosinophils % 4 0 - 7 %    Basophils % 1 0 - 1 %    Immature Granulocytes % 0 0.0 - 0.5 %    Neutrophils Absolute 3.7 1.8 - 8.0 K/UL    Lymphocytes Absolute 3.4 0.8 - 3.5 K/UL    Monocytes Absolute 0.7 0.0 - 1.0 K/UL    Eosinophils Absolute 0.3 0.0 - 0.4 K/UL    Basophils Absolute 0.0 0.0 - 0.1 K/UL    Immature Granulocytes Absolute 0.0 0.00 - 0.04 K/UL    Differential Type AUTOMATED     Comprehensive Metabolic Panel    Collection Time: 06/26/24  4:31 PM   Result Value Ref Range

## 2024-06-28 LAB
ANION GAP SERPL CALC-SCNC: 3 MMOL/L (ref 5–15)
BUN SERPL-MCNC: 27 MG/DL (ref 6–20)
BUN/CREAT SERPL: 18 (ref 12–20)
CALCIUM SERPL-MCNC: 7.8 MG/DL (ref 8.5–10.1)
CHLORIDE SERPL-SCNC: 99 MMOL/L (ref 97–108)
CO2 SERPL-SCNC: 33 MMOL/L (ref 21–32)
CREAT SERPL-MCNC: 1.52 MG/DL (ref 0.55–1.02)
GLUCOSE BLD STRIP.AUTO-MCNC: 132 MG/DL (ref 65–117)
GLUCOSE BLD STRIP.AUTO-MCNC: 258 MG/DL (ref 65–117)
GLUCOSE BLD STRIP.AUTO-MCNC: 335 MG/DL (ref 65–117)
GLUCOSE BLD STRIP.AUTO-MCNC: 352 MG/DL (ref 65–117)
GLUCOSE BLD STRIP.AUTO-MCNC: 424 MG/DL (ref 65–117)
GLUCOSE BLD STRIP.AUTO-MCNC: 48 MG/DL (ref 65–117)
GLUCOSE BLD STRIP.AUTO-MCNC: 55 MG/DL (ref 65–117)
GLUCOSE BLD STRIP.AUTO-MCNC: 74 MG/DL (ref 65–117)
GLUCOSE SERPL-MCNC: 253 MG/DL (ref 65–100)
MAGNESIUM SERPL-MCNC: 2.2 MG/DL (ref 1.6–2.4)
NT PRO BNP: 342 PG/ML
POTASSIUM SERPL-SCNC: 3.7 MMOL/L (ref 3.5–5.1)
SERVICE CMNT-IMP: ABNORMAL
SERVICE CMNT-IMP: NORMAL
SODIUM SERPL-SCNC: 135 MMOL/L (ref 136–145)

## 2024-06-28 PROCEDURE — 94640 AIRWAY INHALATION TREATMENT: CPT

## 2024-06-28 PROCEDURE — 6360000002 HC RX W HCPCS: Performed by: INTERNAL MEDICINE

## 2024-06-28 PROCEDURE — 6370000000 HC RX 637 (ALT 250 FOR IP): Performed by: STUDENT IN AN ORGANIZED HEALTH CARE EDUCATION/TRAINING PROGRAM

## 2024-06-28 PROCEDURE — 2580000003 HC RX 258: Performed by: STUDENT IN AN ORGANIZED HEALTH CARE EDUCATION/TRAINING PROGRAM

## 2024-06-28 PROCEDURE — 99221 1ST HOSP IP/OBS SF/LOW 40: CPT

## 2024-06-28 PROCEDURE — 6370000000 HC RX 637 (ALT 250 FOR IP)

## 2024-06-28 PROCEDURE — 82962 GLUCOSE BLOOD TEST: CPT

## 2024-06-28 PROCEDURE — 2060000000 HC ICU INTERMEDIATE R&B

## 2024-06-28 PROCEDURE — 36415 COLL VENOUS BLD VENIPUNCTURE: CPT

## 2024-06-28 PROCEDURE — 83880 ASSAY OF NATRIURETIC PEPTIDE: CPT

## 2024-06-28 PROCEDURE — 80048 BASIC METABOLIC PNL TOTAL CA: CPT

## 2024-06-28 PROCEDURE — 6360000002 HC RX W HCPCS: Performed by: STUDENT IN AN ORGANIZED HEALTH CARE EDUCATION/TRAINING PROGRAM

## 2024-06-28 PROCEDURE — 6370000000 HC RX 637 (ALT 250 FOR IP): Performed by: INTERNAL MEDICINE

## 2024-06-28 PROCEDURE — 83735 ASSAY OF MAGNESIUM: CPT

## 2024-06-28 RX ORDER — ALPRAZOLAM 0.5 MG/1
2 TABLET ORAL 3 TIMES DAILY PRN
Status: DISCONTINUED | OUTPATIENT
Start: 2024-06-28 | End: 2024-07-02 | Stop reason: HOSPADM

## 2024-06-28 RX ORDER — MIDODRINE HYDROCHLORIDE 5 MG/1
5 TABLET ORAL 3 TIMES DAILY PRN
Status: DISCONTINUED | OUTPATIENT
Start: 2024-06-28 | End: 2024-06-28

## 2024-06-28 RX ORDER — BUMETANIDE 0.25 MG/ML
1 INJECTION INTRAMUSCULAR; INTRAVENOUS EVERY 6 HOURS
Status: DISCONTINUED | OUTPATIENT
Start: 2024-06-28 | End: 2024-06-29

## 2024-06-28 RX ORDER — MIDODRINE HYDROCHLORIDE 5 MG/1
10 TABLET ORAL 3 TIMES DAILY PRN
Status: DISCONTINUED | OUTPATIENT
Start: 2024-06-28 | End: 2024-07-02 | Stop reason: HOSPADM

## 2024-06-28 RX ORDER — MIDODRINE HYDROCHLORIDE 5 MG/1
10 TABLET ORAL ONCE
Status: COMPLETED | OUTPATIENT
Start: 2024-06-28 | End: 2024-06-28

## 2024-06-28 RX ORDER — INSULIN GLARGINE 100 [IU]/ML
24 INJECTION, SOLUTION SUBCUTANEOUS DAILY
Status: DISCONTINUED | OUTPATIENT
Start: 2024-06-29 | End: 2024-06-28

## 2024-06-28 RX ORDER — INSULIN GLARGINE 100 [IU]/ML
24 INJECTION, SOLUTION SUBCUTANEOUS DAILY
Status: DISCONTINUED | OUTPATIENT
Start: 2024-06-28 | End: 2024-07-02 | Stop reason: HOSPADM

## 2024-06-28 RX ADMIN — INSULIN LISPRO 2 UNITS: 100 INJECTION, SOLUTION INTRAVENOUS; SUBCUTANEOUS at 08:35

## 2024-06-28 RX ADMIN — METOCLOPRAMIDE 5 MG: 10 TABLET ORAL at 20:41

## 2024-06-28 RX ADMIN — BUMETANIDE 1 MG: 0.25 INJECTION INTRAMUSCULAR; INTRAVENOUS at 15:33

## 2024-06-28 RX ADMIN — INSULIN LISPRO 4 UNITS: 100 INJECTION, SOLUTION INTRAVENOUS; SUBCUTANEOUS at 20:41

## 2024-06-28 RX ADMIN — OXYCODONE 2.5 MG: 5 TABLET ORAL at 12:55

## 2024-06-28 RX ADMIN — DULOXETINE HYDROCHLORIDE 20 MG: 20 CAPSULE, DELAYED RELEASE ORAL at 08:38

## 2024-06-28 RX ADMIN — CITALOPRAM HYDROBROMIDE 40 MG: 20 TABLET ORAL at 08:37

## 2024-06-28 RX ADMIN — ACETAMINOPHEN 650 MG: 325 TABLET ORAL at 20:41

## 2024-06-28 RX ADMIN — THERA TABS 1 TABLET: TAB at 08:38

## 2024-06-28 RX ADMIN — BUSPIRONE HYDROCHLORIDE 15 MG: 5 TABLET ORAL at 15:47

## 2024-06-28 RX ADMIN — ASPIRIN 81 MG: 81 TABLET, COATED ORAL at 08:38

## 2024-06-28 RX ADMIN — IPRATROPIUM BROMIDE 0.5 MG: 0.5 SOLUTION RESPIRATORY (INHALATION) at 20:25

## 2024-06-28 RX ADMIN — MORPHINE SULFATE 90 MG: 30 TABLET, FILM COATED, EXTENDED RELEASE ORAL at 08:37

## 2024-06-28 RX ADMIN — MORPHINE SULFATE 90 MG: 30 TABLET, FILM COATED, EXTENDED RELEASE ORAL at 20:40

## 2024-06-28 RX ADMIN — BUSPIRONE HYDROCHLORIDE 15 MG: 5 TABLET ORAL at 20:41

## 2024-06-28 RX ADMIN — OXYCODONE 2.5 MG: 5 TABLET ORAL at 20:27

## 2024-06-28 RX ADMIN — MORPHINE SULFATE 90 MG: 30 TABLET, FILM COATED, EXTENDED RELEASE ORAL at 00:11

## 2024-06-28 RX ADMIN — IPRATROPIUM BROMIDE 0.5 MG: 0.5 SOLUTION RESPIRATORY (INHALATION) at 08:24

## 2024-06-28 RX ADMIN — ENOXAPARIN SODIUM 40 MG: 100 INJECTION SUBCUTANEOUS at 08:36

## 2024-06-28 RX ADMIN — ALPRAZOLAM 2 MG: 0.5 TABLET ORAL at 08:38

## 2024-06-28 RX ADMIN — Medication 5000 UNITS: at 08:40

## 2024-06-28 RX ADMIN — BREXPIPRAZOLE 2 MG: 1 TABLET ORAL at 08:35

## 2024-06-28 RX ADMIN — BUMETANIDE 1 MG: 0.25 INJECTION INTRAMUSCULAR; INTRAVENOUS at 08:38

## 2024-06-28 RX ADMIN — INSULIN GLARGINE 24 UNITS: 100 INJECTION, SOLUTION SUBCUTANEOUS at 10:17

## 2024-06-28 RX ADMIN — SODIUM CHLORIDE, PRESERVATIVE FREE 10 ML: 5 INJECTION INTRAVENOUS at 08:39

## 2024-06-28 RX ADMIN — INSULIN LISPRO 3 UNITS: 100 INJECTION, SOLUTION INTRAVENOUS; SUBCUTANEOUS at 12:15

## 2024-06-28 RX ADMIN — ROSUVASTATIN CALCIUM 40 MG: 40 TABLET, FILM COATED ORAL at 08:36

## 2024-06-28 RX ADMIN — SODIUM CHLORIDE, PRESERVATIVE FREE 5 ML: 5 INJECTION INTRAVENOUS at 21:00

## 2024-06-28 RX ADMIN — Medication 16 G: at 00:39

## 2024-06-28 RX ADMIN — BUSPIRONE HYDROCHLORIDE 15 MG: 5 TABLET ORAL at 08:36

## 2024-06-28 RX ADMIN — MIDODRINE HYDROCHLORIDE 10 MG: 5 TABLET ORAL at 03:51

## 2024-06-28 RX ADMIN — BUMETANIDE 1 MG: 0.25 INJECTION INTRAMUSCULAR; INTRAVENOUS at 20:41

## 2024-06-28 RX ADMIN — PANTOPRAZOLE SODIUM 40 MG: 40 TABLET, DELAYED RELEASE ORAL at 06:35

## 2024-06-28 RX ADMIN — ALPRAZOLAM 2 MG: 0.5 TABLET ORAL at 20:39

## 2024-06-28 RX ADMIN — ALPRAZOLAM 2 MG: 0.5 TABLET ORAL at 16:20

## 2024-06-28 RX ADMIN — METOCLOPRAMIDE 5 MG: 10 TABLET ORAL at 08:36

## 2024-06-28 ASSESSMENT — PAIN - FUNCTIONAL ASSESSMENT: PAIN_FUNCTIONAL_ASSESSMENT: ACTIVITIES ARE NOT PREVENTED

## 2024-06-28 ASSESSMENT — PAIN SCALES - GENERAL
PAINLEVEL_OUTOF10: 3
PAINLEVEL_OUTOF10: 6
PAINLEVEL_OUTOF10: 3
PAINLEVEL_OUTOF10: 6
PAINLEVEL_OUTOF10: 2

## 2024-06-28 ASSESSMENT — PAIN DESCRIPTION - ORIENTATION
ORIENTATION: LOWER
ORIENTATION: RIGHT;LEFT
ORIENTATION: LOWER

## 2024-06-28 ASSESSMENT — PAIN DESCRIPTION - PAIN TYPE: TYPE: CHRONIC PAIN

## 2024-06-28 ASSESSMENT — PAIN DESCRIPTION - LOCATION
LOCATION: ABDOMEN;KNEE
LOCATION: VAGINA
LOCATION: ABDOMEN
LOCATION: ABDOMEN

## 2024-06-28 ASSESSMENT — PAIN DESCRIPTION - DESCRIPTORS
DESCRIPTORS: STABBING
DESCRIPTORS: ACHING
DESCRIPTORS: STABBING

## 2024-06-28 NOTE — WOUND CARE
Wound care consult for the \"redness and old sore on sacrum\".  Chart reviewed and patient assessed. Pt. Is ambulatory and has a José Antonio Score of 20 (no risk).    Assessment: Pt. Stood at the front of her chair in her room without problems. The linear fissure is pink, slightly open and \"sore\" to touch.   Treatment: the skin was cleansed and dried and then treated with zinc oxide cream to the wound and immediate surrounding skin. Pt. To treat it with this twice per day and as needed. Instructed her to get Desitin cream at the store.   Linear fissure  - treat with zinc based cream  And leave open to air.       Mercedes Leiva, RN, BSN, CWON

## 2024-06-28 NOTE — CARE COORDINATION
Care Management Initial Assessment       RUR:  35%   Readmission? No  1st IM letter given? Yes - 6/27/24  1st  letter given: No         06/28/24 1535   Service Assessment   Patient Orientation Alert and Oriented;Person;Place;Situation;Self   Cognition Alert   History Provided By Patient   Primary Caregiver Self   Support Systems Spouse/Significant Other   Patient's Healthcare Decision Maker is: Named in Scanned ACP Document   PCP Verified by CM Yes  (Pt sees Dr. Jair Vieira.)   Last Visit to PCP Within last 3 months  (two weeks ago)   Prior Functional Level Independent in ADLs/IADLs   Current Functional Level Independent in ADLs/IADLs   Can patient return to prior living arrangement Yes   Ability to make needs known: Good   Family able to assist with home care needs: Yes  (significant other)   Would you like for me to discuss the discharge plan with any other family members/significant others, and if so, who? Yes  (significant other)   Financial Resources Medicare;Medicaid   Community Resources None   Social/Functional History   Lives With Significant other   Type of Home House  (one floor with1 jemal)   Home Layout One level   Home Equipment Oxygen  (Pt stated she has home o2 (2LPM) through [employer] at home that she wears as needed.)   Active  No   Discharge Planning   Type of Residence House       CM met with pt at bedside to introduce self/role, complete initial assessment and verify demographics.  Pt lives with her significant other in a one floor home with 3 jemal.  Pt has home o2 through [employer] 2LPM which she uses as needed.  Pt denied a hx of HH, SNF or IPR.  Pt uses the [facility] on [facility].  Pt's significant other will transport at d/c.     Advance Care Planning     General Advance Care Planning (ACP) Conversation    Date of Conversation: 6/28/2024  Conducted with: Patient with Decision Making Capacity  Other persons present: None    Healthcare Decision Maker:   Primary Decision Maker:

## 2024-06-28 NOTE — DIABETES MGMT
[facility] FOR DIABETES HEALTH  DIABETES MANAGEMENT CONSULT    Consulted by Provider for advanced nursing evaluation and care for inpatient blood glucose management.    Evaluation and Action Plan   Anette Cannon is a 54 year old female patient with a hx of Type1 diabetes. She follows with an endocrinologist. She reportedly uses 30 units Lantus and 6 units Humalog with each meal + corrective scale insulin. The patient will require basal insulin to keep BG's stable. I have adjusted to 0.3 xkg for now. Tahmina increase to 04.kg if FBG is >180 tomorrow. May need to initiate scheduled meal time if the patient is eating 50% or more of meal tray consistently. The patient was admitted for BLE and SOB. Hx of CHF and COPD. She also found to have a wound on her buttocks. Wound care to follow patient while hospitalized.     Management Rationale Action Plan   Medication   Basal needs Using 0.3 units/kg/D based on weight    Corrective insulin Using low dose sensitivity based on BG trends    Additional orders          Diabetes Discharge Plan   Medication  Likely resume PTA insulin dosing and follow-up with endocrinologist   Referral  []        Outpatient diabetes education   Additional orders            Initial Presentation   Anette Cannon is a 54 y.o. female admitted after experiencing Edema and increased SOB. Hx of CHF and COPD. .  LAB: Glucose 35. Creatine 1.75. GFR 34.   CXR:Narrative & Impression  EXAM:  XR CHEST PORTABLE     INDICATION: Short of breath     COMPARISON: 5/28/2024     TECHNIQUE: portable chest AP view     FINDINGS: The cardiac silhouette is within normal limits. The pulmonary  vasculature is within normal limits.      The lungs and pleural spaces are clear. The visualized bones and upper abdomen  are age-appropriate.     IMPRESSION:     No acute process on portable chest.      HX:   Past Medical History:   Diagnosis Date    Achilles tendon rupture     along with torn right patellar/femoral ligament

## 2024-06-29 LAB
ANION GAP SERPL CALC-SCNC: 2 MMOL/L (ref 5–15)
BUN SERPL-MCNC: 28 MG/DL (ref 6–20)
BUN/CREAT SERPL: 20 (ref 12–20)
CALCIUM SERPL-MCNC: 8 MG/DL (ref 8.5–10.1)
CHLORIDE SERPL-SCNC: 100 MMOL/L (ref 97–108)
CO2 SERPL-SCNC: 33 MMOL/L (ref 21–32)
CREAT SERPL-MCNC: 1.41 MG/DL (ref 0.55–1.02)
GLUCOSE BLD STRIP.AUTO-MCNC: 199 MG/DL (ref 65–117)
GLUCOSE BLD STRIP.AUTO-MCNC: 205 MG/DL (ref 65–117)
GLUCOSE BLD STRIP.AUTO-MCNC: 229 MG/DL (ref 65–117)
GLUCOSE BLD STRIP.AUTO-MCNC: 295 MG/DL (ref 65–117)
GLUCOSE BLD STRIP.AUTO-MCNC: 343 MG/DL (ref 65–117)
GLUCOSE BLD STRIP.AUTO-MCNC: 347 MG/DL (ref 65–117)
GLUCOSE BLD STRIP.AUTO-MCNC: 385 MG/DL (ref 65–117)
GLUCOSE BLD STRIP.AUTO-MCNC: 427 MG/DL (ref 65–117)
GLUCOSE BLD STRIP.AUTO-MCNC: 437 MG/DL (ref 65–117)
GLUCOSE BLD STRIP.AUTO-MCNC: 477 MG/DL (ref 65–117)
GLUCOSE SERPL-MCNC: 175 MG/DL (ref 65–100)
MAGNESIUM SERPL-MCNC: 2 MG/DL (ref 1.6–2.4)
POTASSIUM SERPL-SCNC: 5.1 MMOL/L (ref 3.5–5.1)
SERVICE CMNT-IMP: ABNORMAL
SODIUM SERPL-SCNC: 135 MMOL/L (ref 136–145)

## 2024-06-29 PROCEDURE — 94640 AIRWAY INHALATION TREATMENT: CPT

## 2024-06-29 PROCEDURE — 6370000000 HC RX 637 (ALT 250 FOR IP): Performed by: STUDENT IN AN ORGANIZED HEALTH CARE EDUCATION/TRAINING PROGRAM

## 2024-06-29 PROCEDURE — 83735 ASSAY OF MAGNESIUM: CPT

## 2024-06-29 PROCEDURE — 6360000002 HC RX W HCPCS: Performed by: INTERNAL MEDICINE

## 2024-06-29 PROCEDURE — 6360000002 HC RX W HCPCS: Performed by: STUDENT IN AN ORGANIZED HEALTH CARE EDUCATION/TRAINING PROGRAM

## 2024-06-29 PROCEDURE — 2060000000 HC ICU INTERMEDIATE R&B

## 2024-06-29 PROCEDURE — 6370000000 HC RX 637 (ALT 250 FOR IP): Performed by: INTERNAL MEDICINE

## 2024-06-29 PROCEDURE — 82962 GLUCOSE BLOOD TEST: CPT

## 2024-06-29 PROCEDURE — 80048 BASIC METABOLIC PNL TOTAL CA: CPT

## 2024-06-29 PROCEDURE — 6370000000 HC RX 637 (ALT 250 FOR IP)

## 2024-06-29 PROCEDURE — 36415 COLL VENOUS BLD VENIPUNCTURE: CPT

## 2024-06-29 PROCEDURE — 2580000003 HC RX 258: Performed by: STUDENT IN AN ORGANIZED HEALTH CARE EDUCATION/TRAINING PROGRAM

## 2024-06-29 RX ORDER — INSULIN LISPRO 100 [IU]/ML
1-5 INJECTION, SOLUTION INTRAVENOUS; SUBCUTANEOUS
Status: DISCONTINUED | OUTPATIENT
Start: 2024-06-29 | End: 2024-07-02 | Stop reason: HOSPADM

## 2024-06-29 RX ORDER — BUMETANIDE 0.25 MG/ML
2 INJECTION INTRAMUSCULAR; INTRAVENOUS 2 TIMES DAILY
Status: DISCONTINUED | OUTPATIENT
Start: 2024-06-29 | End: 2024-06-30

## 2024-06-29 RX ORDER — OXYCODONE HYDROCHLORIDE 5 MG/1
5 TABLET ORAL EVERY 4 HOURS PRN
Status: DISCONTINUED | OUTPATIENT
Start: 2024-06-29 | End: 2024-07-02 | Stop reason: HOSPADM

## 2024-06-29 RX ORDER — LORAZEPAM 1 MG/1
2 TABLET ORAL ONCE
Status: COMPLETED | OUTPATIENT
Start: 2024-06-29 | End: 2024-06-29

## 2024-06-29 RX ORDER — INSULIN LISPRO 100 [IU]/ML
0-4 INJECTION, SOLUTION INTRAVENOUS; SUBCUTANEOUS
Status: DISCONTINUED | OUTPATIENT
Start: 2024-06-30 | End: 2024-07-02 | Stop reason: HOSPADM

## 2024-06-29 RX ADMIN — OXYCODONE 5 MG: 5 TABLET ORAL at 14:27

## 2024-06-29 RX ADMIN — THERA TABS 1 TABLET: TAB at 08:22

## 2024-06-29 RX ADMIN — INSULIN LISPRO 1 UNITS: 100 INJECTION, SOLUTION INTRAVENOUS; SUBCUTANEOUS at 08:20

## 2024-06-29 RX ADMIN — IPRATROPIUM BROMIDE 0.5 MG: 0.5 SOLUTION RESPIRATORY (INHALATION) at 20:22

## 2024-06-29 RX ADMIN — BREXPIPRAZOLE 2 MG: 1 TABLET ORAL at 08:22

## 2024-06-29 RX ADMIN — BUSPIRONE HYDROCHLORIDE 15 MG: 5 TABLET ORAL at 21:09

## 2024-06-29 RX ADMIN — BUSPIRONE HYDROCHLORIDE 15 MG: 5 TABLET ORAL at 14:27

## 2024-06-29 RX ADMIN — ASPIRIN 81 MG: 81 TABLET, COATED ORAL at 08:22

## 2024-06-29 RX ADMIN — MORPHINE SULFATE 90 MG: 30 TABLET, FILM COATED, EXTENDED RELEASE ORAL at 08:20

## 2024-06-29 RX ADMIN — INSULIN LISPRO 3 UNITS: 100 INJECTION, SOLUTION INTRAVENOUS; SUBCUTANEOUS at 17:21

## 2024-06-29 RX ADMIN — OXYCODONE 2.5 MG: 5 TABLET ORAL at 02:31

## 2024-06-29 RX ADMIN — SODIUM CHLORIDE, PRESERVATIVE FREE 10 ML: 5 INJECTION INTRAVENOUS at 21:12

## 2024-06-29 RX ADMIN — BUMETANIDE 1 MG: 0.25 INJECTION INTRAMUSCULAR; INTRAVENOUS at 08:22

## 2024-06-29 RX ADMIN — ROSUVASTATIN CALCIUM 40 MG: 40 TABLET, FILM COATED ORAL at 08:22

## 2024-06-29 RX ADMIN — LORAZEPAM 2 MG: 1 TABLET ORAL at 00:59

## 2024-06-29 RX ADMIN — OXYCODONE 5 MG: 5 TABLET ORAL at 19:58

## 2024-06-29 RX ADMIN — ALPRAZOLAM 2 MG: 0.5 TABLET ORAL at 16:31

## 2024-06-29 RX ADMIN — METOCLOPRAMIDE 5 MG: 10 TABLET ORAL at 21:10

## 2024-06-29 RX ADMIN — DULOXETINE HYDROCHLORIDE 20 MG: 20 CAPSULE, DELAYED RELEASE ORAL at 08:21

## 2024-06-29 RX ADMIN — CITALOPRAM HYDROBROMIDE 40 MG: 20 TABLET ORAL at 08:22

## 2024-06-29 RX ADMIN — ACETAMINOPHEN 650 MG: 325 TABLET ORAL at 04:56

## 2024-06-29 RX ADMIN — BUMETANIDE 2 MG: 0.25 INJECTION INTRAMUSCULAR; INTRAVENOUS at 17:00

## 2024-06-29 RX ADMIN — ALPRAZOLAM 2 MG: 0.5 TABLET ORAL at 22:01

## 2024-06-29 RX ADMIN — BUSPIRONE HYDROCHLORIDE 15 MG: 5 TABLET ORAL at 08:21

## 2024-06-29 RX ADMIN — METOCLOPRAMIDE 5 MG: 10 TABLET ORAL at 08:22

## 2024-06-29 RX ADMIN — SODIUM CHLORIDE, PRESERVATIVE FREE 10 ML: 5 INJECTION INTRAVENOUS at 08:23

## 2024-06-29 RX ADMIN — INSULIN LISPRO 3 UNITS: 100 INJECTION, SOLUTION INTRAVENOUS; SUBCUTANEOUS at 16:59

## 2024-06-29 RX ADMIN — ALPRAZOLAM 2 MG: 0.5 TABLET ORAL at 04:56

## 2024-06-29 RX ADMIN — INSULIN LISPRO 4 UNITS: 100 INJECTION, SOLUTION INTRAVENOUS; SUBCUTANEOUS at 11:12

## 2024-06-29 RX ADMIN — PANTOPRAZOLE SODIUM 40 MG: 40 TABLET, DELAYED RELEASE ORAL at 06:52

## 2024-06-29 RX ADMIN — ENOXAPARIN SODIUM 40 MG: 100 INJECTION SUBCUTANEOUS at 08:22

## 2024-06-29 RX ADMIN — Medication 5000 UNITS: at 08:22

## 2024-06-29 RX ADMIN — IPRATROPIUM BROMIDE 0.5 MG: 0.5 SOLUTION RESPIRATORY (INHALATION) at 11:01

## 2024-06-29 RX ADMIN — BUMETANIDE 1 MG: 0.25 INJECTION INTRAMUSCULAR; INTRAVENOUS at 02:35

## 2024-06-29 RX ADMIN — OXYCODONE 2.5 MG: 5 TABLET ORAL at 10:39

## 2024-06-29 RX ADMIN — INSULIN GLARGINE 24 UNITS: 100 INJECTION, SOLUTION SUBCUTANEOUS at 08:20

## 2024-06-29 RX ADMIN — MORPHINE SULFATE 90 MG: 30 TABLET, FILM COATED, EXTENDED RELEASE ORAL at 21:09

## 2024-06-29 ASSESSMENT — PAIN DESCRIPTION - DESCRIPTORS
DESCRIPTORS: ACHING;CRAMPING
DESCRIPTORS: ACHING;DISCOMFORT
DESCRIPTORS: ACHING;CRAMPING
DESCRIPTORS: DISCOMFORT;ACHING
DESCRIPTORS: ACHING;CRAMPING

## 2024-06-29 ASSESSMENT — PAIN SCALES - GENERAL
PAINLEVEL_OUTOF10: 6
PAINLEVEL_OUTOF10: 6
PAINLEVEL_OUTOF10: 7
PAINLEVEL_OUTOF10: 2
PAINLEVEL_OUTOF10: 6
PAINLEVEL_OUTOF10: 6
PAINLEVEL_OUTOF10: 5
PAINLEVEL_OUTOF10: 8
PAINLEVEL_OUTOF10: 7
PAINLEVEL_OUTOF10: 8

## 2024-06-29 ASSESSMENT — PAIN DESCRIPTION - ORIENTATION
ORIENTATION: LOWER
ORIENTATION: ANTERIOR
ORIENTATION: ANTERIOR

## 2024-06-29 ASSESSMENT — PAIN DESCRIPTION - LOCATION
LOCATION: ABDOMEN

## 2024-06-30 LAB
ANION GAP SERPL CALC-SCNC: 2 MMOL/L (ref 5–15)
BUN SERPL-MCNC: 29 MG/DL (ref 6–20)
BUN/CREAT SERPL: 22 (ref 12–20)
CALCIUM SERPL-MCNC: 7.4 MG/DL (ref 8.5–10.1)
CHLORIDE SERPL-SCNC: 103 MMOL/L (ref 97–108)
CO2 SERPL-SCNC: 31 MMOL/L (ref 21–32)
CREAT SERPL-MCNC: 1.31 MG/DL (ref 0.55–1.02)
GLUCOSE BLD STRIP.AUTO-MCNC: 136 MG/DL (ref 65–117)
GLUCOSE BLD STRIP.AUTO-MCNC: 150 MG/DL (ref 65–117)
GLUCOSE BLD STRIP.AUTO-MCNC: 226 MG/DL (ref 65–117)
GLUCOSE BLD STRIP.AUTO-MCNC: 312 MG/DL (ref 65–117)
GLUCOSE BLD STRIP.AUTO-MCNC: 73 MG/DL (ref 65–117)
GLUCOSE BLD STRIP.AUTO-MCNC: 80 MG/DL (ref 65–117)
GLUCOSE SERPL-MCNC: 254 MG/DL (ref 65–100)
MAGNESIUM SERPL-MCNC: 1.9 MG/DL (ref 1.6–2.4)
POTASSIUM SERPL-SCNC: 4.8 MMOL/L (ref 3.5–5.1)
SERVICE CMNT-IMP: ABNORMAL
SERVICE CMNT-IMP: NORMAL
SERVICE CMNT-IMP: NORMAL
SODIUM SERPL-SCNC: 136 MMOL/L (ref 136–145)

## 2024-06-30 PROCEDURE — 6370000000 HC RX 637 (ALT 250 FOR IP): Performed by: STUDENT IN AN ORGANIZED HEALTH CARE EDUCATION/TRAINING PROGRAM

## 2024-06-30 PROCEDURE — 6370000000 HC RX 637 (ALT 250 FOR IP)

## 2024-06-30 PROCEDURE — 94640 AIRWAY INHALATION TREATMENT: CPT

## 2024-06-30 PROCEDURE — 6360000002 HC RX W HCPCS: Performed by: STUDENT IN AN ORGANIZED HEALTH CARE EDUCATION/TRAINING PROGRAM

## 2024-06-30 PROCEDURE — 6370000000 HC RX 637 (ALT 250 FOR IP): Performed by: INTERNAL MEDICINE

## 2024-06-30 PROCEDURE — 83735 ASSAY OF MAGNESIUM: CPT

## 2024-06-30 PROCEDURE — 6360000002 HC RX W HCPCS: Performed by: INTERNAL MEDICINE

## 2024-06-30 PROCEDURE — 2060000000 HC ICU INTERMEDIATE R&B

## 2024-06-30 PROCEDURE — 82962 GLUCOSE BLOOD TEST: CPT

## 2024-06-30 PROCEDURE — 80048 BASIC METABOLIC PNL TOTAL CA: CPT

## 2024-06-30 PROCEDURE — 2580000003 HC RX 258: Performed by: STUDENT IN AN ORGANIZED HEALTH CARE EDUCATION/TRAINING PROGRAM

## 2024-06-30 PROCEDURE — P9047 ALBUMIN (HUMAN), 25%, 50ML: HCPCS | Performed by: INTERNAL MEDICINE

## 2024-06-30 PROCEDURE — 36415 COLL VENOUS BLD VENIPUNCTURE: CPT

## 2024-06-30 RX ORDER — METOLAZONE 2.5 MG/1
2.5 TABLET ORAL ONCE
Status: COMPLETED | OUTPATIENT
Start: 2024-06-30 | End: 2024-06-30

## 2024-06-30 RX ORDER — ALBUMIN (HUMAN) 12.5 G/50ML
25 SOLUTION INTRAVENOUS 2 TIMES DAILY
Status: DISCONTINUED | OUTPATIENT
Start: 2024-06-30 | End: 2024-07-02 | Stop reason: HOSPADM

## 2024-06-30 RX ORDER — LANOLIN ALCOHOL/MO/W.PET/CERES
3 CREAM (GRAM) TOPICAL NIGHTLY PRN
Status: DISCONTINUED | OUTPATIENT
Start: 2024-06-30 | End: 2024-07-02 | Stop reason: HOSPADM

## 2024-06-30 RX ORDER — BUMETANIDE 0.25 MG/ML
3 INJECTION INTRAMUSCULAR; INTRAVENOUS 2 TIMES DAILY
Status: DISCONTINUED | OUTPATIENT
Start: 2024-06-30 | End: 2024-07-01

## 2024-06-30 RX ADMIN — BUSPIRONE HYDROCHLORIDE 15 MG: 5 TABLET ORAL at 21:08

## 2024-06-30 RX ADMIN — BUMETANIDE 3 MG: 0.25 INJECTION INTRAMUSCULAR; INTRAVENOUS at 16:54

## 2024-06-30 RX ADMIN — INSULIN LISPRO 2 UNITS: 100 INJECTION, SOLUTION INTRAVENOUS; SUBCUTANEOUS at 08:24

## 2024-06-30 RX ADMIN — PANTOPRAZOLE SODIUM 40 MG: 40 TABLET, DELAYED RELEASE ORAL at 05:31

## 2024-06-30 RX ADMIN — BUMETANIDE 2 MG: 0.25 INJECTION INTRAMUSCULAR; INTRAVENOUS at 08:22

## 2024-06-30 RX ADMIN — CITALOPRAM HYDROBROMIDE 40 MG: 20 TABLET ORAL at 08:22

## 2024-06-30 RX ADMIN — THERA TABS 1 TABLET: TAB at 08:22

## 2024-06-30 RX ADMIN — DULOXETINE HYDROCHLORIDE 20 MG: 20 CAPSULE, DELAYED RELEASE ORAL at 08:22

## 2024-06-30 RX ADMIN — SODIUM CHLORIDE, PRESERVATIVE FREE 10 ML: 5 INJECTION INTRAVENOUS at 21:09

## 2024-06-30 RX ADMIN — MORPHINE SULFATE 90 MG: 30 TABLET, FILM COATED, EXTENDED RELEASE ORAL at 21:09

## 2024-06-30 RX ADMIN — ENOXAPARIN SODIUM 40 MG: 100 INJECTION SUBCUTANEOUS at 08:23

## 2024-06-30 RX ADMIN — METOLAZONE 2.5 MG: 2.5 TABLET ORAL at 08:22

## 2024-06-30 RX ADMIN — OXYCODONE 5 MG: 5 TABLET ORAL at 05:31

## 2024-06-30 RX ADMIN — OXYCODONE 5 MG: 5 TABLET ORAL at 18:36

## 2024-06-30 RX ADMIN — INSULIN LISPRO 2 UNITS: 100 INJECTION, SOLUTION INTRAVENOUS; SUBCUTANEOUS at 11:47

## 2024-06-30 RX ADMIN — METOCLOPRAMIDE 5 MG: 10 TABLET ORAL at 21:08

## 2024-06-30 RX ADMIN — ALBUMIN (HUMAN) 25 G: 0.25 INJECTION, SOLUTION INTRAVENOUS at 17:09

## 2024-06-30 RX ADMIN — ASPIRIN 81 MG: 81 TABLET, COATED ORAL at 08:23

## 2024-06-30 RX ADMIN — IPRATROPIUM BROMIDE 0.5 MG: 0.5 SOLUTION RESPIRATORY (INHALATION) at 07:41

## 2024-06-30 RX ADMIN — OXYCODONE 5 MG: 5 TABLET ORAL at 13:28

## 2024-06-30 RX ADMIN — SODIUM CHLORIDE, PRESERVATIVE FREE 10 ML: 5 INJECTION INTRAVENOUS at 08:33

## 2024-06-30 RX ADMIN — INSULIN LISPRO 1 UNITS: 100 INJECTION, SOLUTION INTRAVENOUS; SUBCUTANEOUS at 08:24

## 2024-06-30 RX ADMIN — ROSUVASTATIN CALCIUM 40 MG: 40 TABLET, FILM COATED ORAL at 08:22

## 2024-06-30 RX ADMIN — BREXPIPRAZOLE 2 MG: 1 TABLET ORAL at 08:22

## 2024-06-30 RX ADMIN — INSULIN LISPRO 4 UNITS: 100 INJECTION, SOLUTION INTRAVENOUS; SUBCUTANEOUS at 11:46

## 2024-06-30 RX ADMIN — BUSPIRONE HYDROCHLORIDE 15 MG: 5 TABLET ORAL at 08:22

## 2024-06-30 RX ADMIN — Medication 5000 UNITS: at 08:21

## 2024-06-30 RX ADMIN — ALPRAZOLAM 2 MG: 0.5 TABLET ORAL at 21:08

## 2024-06-30 RX ADMIN — MORPHINE SULFATE 90 MG: 30 TABLET, FILM COATED, EXTENDED RELEASE ORAL at 08:22

## 2024-06-30 RX ADMIN — INSULIN LISPRO 4 UNITS: 100 INJECTION, SOLUTION INTRAVENOUS; SUBCUTANEOUS at 16:54

## 2024-06-30 RX ADMIN — BUSPIRONE HYDROCHLORIDE 15 MG: 5 TABLET ORAL at 13:28

## 2024-06-30 RX ADMIN — ALPRAZOLAM 2 MG: 0.5 TABLET ORAL at 10:51

## 2024-06-30 RX ADMIN — MELATONIN 3 MG: at 02:27

## 2024-06-30 RX ADMIN — METOCLOPRAMIDE 5 MG: 10 TABLET ORAL at 08:22

## 2024-06-30 RX ADMIN — INSULIN GLARGINE 24 UNITS: 100 INJECTION, SOLUTION SUBCUTANEOUS at 08:23

## 2024-06-30 RX ADMIN — IPRATROPIUM BROMIDE 0.5 MG: 0.5 SOLUTION RESPIRATORY (INHALATION) at 20:46

## 2024-06-30 RX ADMIN — Medication 4 G: at 14:23

## 2024-06-30 ASSESSMENT — PAIN DESCRIPTION - ORIENTATION
ORIENTATION: LOWER
ORIENTATION: ANTERIOR
ORIENTATION: LOWER

## 2024-06-30 ASSESSMENT — PAIN SCALES - GENERAL
PAINLEVEL_OUTOF10: 0
PAINLEVEL_OUTOF10: 8
PAINLEVEL_OUTOF10: 8
PAINLEVEL_OUTOF10: 6
PAINLEVEL_OUTOF10: 8
PAINLEVEL_OUTOF10: 0
PAINLEVEL_OUTOF10: 7

## 2024-06-30 ASSESSMENT — PAIN DESCRIPTION - LOCATION
LOCATION: ABDOMEN

## 2024-06-30 ASSESSMENT — PAIN DESCRIPTION - DESCRIPTORS
DESCRIPTORS: CRAMPING;ACHING
DESCRIPTORS: ACHING;DISCOMFORT

## 2024-06-30 ASSESSMENT — PAIN SCALES - WONG BAKER: WONGBAKER_NUMERICALRESPONSE: NO HURT

## 2024-06-30 NOTE — CONS[place] [address]  Phone - [phone]   Fax - [phone]  [email or web address]

## 2024-07-01 LAB
ANION GAP SERPL CALC-SCNC: 3 MMOL/L (ref 5–15)
BUN SERPL-MCNC: 33 MG/DL (ref 6–20)
BUN/CREAT SERPL: 23 (ref 12–20)
CALCIUM SERPL-MCNC: 8.7 MG/DL (ref 8.5–10.1)
CHLORIDE SERPL-SCNC: 98 MMOL/L (ref 97–108)
CO2 SERPL-SCNC: 36 MMOL/L (ref 21–32)
CREAT SERPL-MCNC: 1.42 MG/DL (ref 0.55–1.02)
GLUCOSE BLD STRIP.AUTO-MCNC: 127 MG/DL (ref 65–117)
GLUCOSE BLD STRIP.AUTO-MCNC: 223 MG/DL (ref 65–117)
GLUCOSE BLD STRIP.AUTO-MCNC: 227 MG/DL (ref 65–117)
GLUCOSE BLD STRIP.AUTO-MCNC: 256 MG/DL (ref 65–117)
GLUCOSE BLD STRIP.AUTO-MCNC: 373 MG/DL (ref 65–117)
GLUCOSE BLD STRIP.AUTO-MCNC: 380 MG/DL (ref 65–117)
GLUCOSE SERPL-MCNC: 118 MG/DL (ref 65–100)
MAGNESIUM SERPL-MCNC: 2.1 MG/DL (ref 1.6–2.4)
POTASSIUM SERPL-SCNC: 3.7 MMOL/L (ref 3.5–5.1)
SERVICE CMNT-IMP: ABNORMAL
SODIUM SERPL-SCNC: 137 MMOL/L (ref 136–145)

## 2024-07-01 PROCEDURE — 6370000000 HC RX 637 (ALT 250 FOR IP): Performed by: STUDENT IN AN ORGANIZED HEALTH CARE EDUCATION/TRAINING PROGRAM

## 2024-07-01 PROCEDURE — 36415 COLL VENOUS BLD VENIPUNCTURE: CPT

## 2024-07-01 PROCEDURE — 94640 AIRWAY INHALATION TREATMENT: CPT

## 2024-07-01 PROCEDURE — 6370000000 HC RX 637 (ALT 250 FOR IP)

## 2024-07-01 PROCEDURE — 2060000000 HC ICU INTERMEDIATE R&B

## 2024-07-01 PROCEDURE — 6370000000 HC RX 637 (ALT 250 FOR IP): Performed by: INTERNAL MEDICINE

## 2024-07-01 PROCEDURE — 2580000003 HC RX 258: Performed by: STUDENT IN AN ORGANIZED HEALTH CARE EDUCATION/TRAINING PROGRAM

## 2024-07-01 PROCEDURE — P9047 ALBUMIN (HUMAN), 25%, 50ML: HCPCS | Performed by: INTERNAL MEDICINE

## 2024-07-01 PROCEDURE — 83735 ASSAY OF MAGNESIUM: CPT

## 2024-07-01 PROCEDURE — 6360000002 HC RX W HCPCS: Performed by: INTERNAL MEDICINE

## 2024-07-01 PROCEDURE — 80048 BASIC METABOLIC PNL TOTAL CA: CPT

## 2024-07-01 PROCEDURE — 6360000002 HC RX W HCPCS: Performed by: STUDENT IN AN ORGANIZED HEALTH CARE EDUCATION/TRAINING PROGRAM

## 2024-07-01 PROCEDURE — 99231 SBSQ HOSP IP/OBS SF/LOW 25: CPT

## 2024-07-01 RX ORDER — BUMETANIDE 1 MG/1
3 TABLET ORAL 2 TIMES DAILY
Status: DISCONTINUED | OUTPATIENT
Start: 2024-07-01 | End: 2024-07-02

## 2024-07-01 RX ADMIN — THERA TABS 1 TABLET: TAB at 08:11

## 2024-07-01 RX ADMIN — MELATONIN 3 MG: at 22:28

## 2024-07-01 RX ADMIN — CITALOPRAM HYDROBROMIDE 40 MG: 20 TABLET ORAL at 08:11

## 2024-07-01 RX ADMIN — INSULIN LISPRO 2 UNITS: 100 INJECTION, SOLUTION INTRAVENOUS; SUBCUTANEOUS at 12:42

## 2024-07-01 RX ADMIN — BUMETANIDE 3 MG: 1 TABLET ORAL at 17:04

## 2024-07-01 RX ADMIN — BUSPIRONE HYDROCHLORIDE 15 MG: 5 TABLET ORAL at 08:10

## 2024-07-01 RX ADMIN — DULOXETINE HYDROCHLORIDE 20 MG: 20 CAPSULE, DELAYED RELEASE ORAL at 08:10

## 2024-07-01 RX ADMIN — MORPHINE SULFATE 90 MG: 30 TABLET, FILM COATED, EXTENDED RELEASE ORAL at 22:28

## 2024-07-01 RX ADMIN — ALPRAZOLAM 2 MG: 0.5 TABLET ORAL at 17:05

## 2024-07-01 RX ADMIN — Medication 5000 UNITS: at 08:11

## 2024-07-01 RX ADMIN — ALPRAZOLAM 2 MG: 0.5 TABLET ORAL at 09:28

## 2024-07-01 RX ADMIN — SODIUM CHLORIDE, PRESERVATIVE FREE 10 ML: 5 INJECTION INTRAVENOUS at 22:33

## 2024-07-01 RX ADMIN — SODIUM CHLORIDE: 9 INJECTION, SOLUTION INTRAVENOUS at 16:48

## 2024-07-01 RX ADMIN — ROSUVASTATIN CALCIUM 40 MG: 40 TABLET, FILM COATED ORAL at 08:11

## 2024-07-01 RX ADMIN — BREXPIPRAZOLE 2 MG: 1 TABLET ORAL at 08:12

## 2024-07-01 RX ADMIN — OXYCODONE 5 MG: 5 TABLET ORAL at 13:20

## 2024-07-01 RX ADMIN — ALBUMIN (HUMAN) 25 G: 0.25 INJECTION, SOLUTION INTRAVENOUS at 08:22

## 2024-07-01 RX ADMIN — IPRATROPIUM BROMIDE 0.5 MG: 0.5 SOLUTION RESPIRATORY (INHALATION) at 07:42

## 2024-07-01 RX ADMIN — OXYCODONE 5 MG: 5 TABLET ORAL at 04:11

## 2024-07-01 RX ADMIN — PANTOPRAZOLE SODIUM 40 MG: 40 TABLET, DELAYED RELEASE ORAL at 05:09

## 2024-07-01 RX ADMIN — BUMETANIDE 3 MG: 0.25 INJECTION INTRAMUSCULAR; INTRAVENOUS at 08:12

## 2024-07-01 RX ADMIN — MORPHINE SULFATE 90 MG: 30 TABLET, FILM COATED, EXTENDED RELEASE ORAL at 08:11

## 2024-07-01 RX ADMIN — METOCLOPRAMIDE 5 MG: 10 TABLET ORAL at 22:28

## 2024-07-01 RX ADMIN — INSULIN LISPRO 3 UNITS: 100 INJECTION, SOLUTION INTRAVENOUS; SUBCUTANEOUS at 16:57

## 2024-07-01 RX ADMIN — SODIUM CHLORIDE, PRESERVATIVE FREE 10 ML: 5 INJECTION INTRAVENOUS at 08:13

## 2024-07-01 RX ADMIN — INSULIN LISPRO 2 UNITS: 100 INJECTION, SOLUTION INTRAVENOUS; SUBCUTANEOUS at 16:58

## 2024-07-01 RX ADMIN — ASPIRIN 81 MG: 81 TABLET, COATED ORAL at 08:11

## 2024-07-01 RX ADMIN — INSULIN GLARGINE 24 UNITS: 100 INJECTION, SOLUTION SUBCUTANEOUS at 08:06

## 2024-07-01 RX ADMIN — ENOXAPARIN SODIUM 40 MG: 100 INJECTION SUBCUTANEOUS at 08:31

## 2024-07-01 RX ADMIN — BUSPIRONE HYDROCHLORIDE 15 MG: 5 TABLET ORAL at 22:28

## 2024-07-01 RX ADMIN — ALBUMIN (HUMAN) 25 G: 0.25 INJECTION, SOLUTION INTRAVENOUS at 16:49

## 2024-07-01 RX ADMIN — INSULIN LISPRO 1 UNITS: 100 INJECTION, SOLUTION INTRAVENOUS; SUBCUTANEOUS at 12:41

## 2024-07-01 RX ADMIN — METOCLOPRAMIDE 5 MG: 10 TABLET ORAL at 08:11

## 2024-07-01 RX ADMIN — INSULIN LISPRO 2 UNITS: 100 INJECTION, SOLUTION INTRAVENOUS; SUBCUTANEOUS at 08:05

## 2024-07-01 RX ADMIN — BUSPIRONE HYDROCHLORIDE 15 MG: 5 TABLET ORAL at 14:14

## 2024-07-01 ASSESSMENT — PAIN DESCRIPTION - LOCATION
LOCATION: ABDOMEN
LOCATION: ABDOMEN
LOCATION: PELVIS

## 2024-07-01 ASSESSMENT — PAIN DESCRIPTION - ORIENTATION
ORIENTATION: ANTERIOR
ORIENTATION: ANTERIOR;LOWER
ORIENTATION: LOWER

## 2024-07-01 ASSESSMENT — PAIN SCALES - GENERAL
PAINLEVEL_OUTOF10: 7
PAINLEVEL_OUTOF10: 3
PAINLEVEL_OUTOF10: 8
PAINLEVEL_OUTOF10: 4
PAINLEVEL_OUTOF10: 4

## 2024-07-01 ASSESSMENT — PAIN DESCRIPTION - PAIN TYPE: TYPE: CHRONIC PAIN

## 2024-07-01 ASSESSMENT — PAIN DESCRIPTION - DESCRIPTORS
DESCRIPTORS: STABBING
DESCRIPTORS: DISCOMFORT;HEAVINESS;ACHING

## 2024-07-01 NOTE — CARE COORDINATION
Transition of Care Plan:    RUR: 36%  Prior Level of Functioning: independant  Disposition: home, significant other  If SNF or IPR: Date FOC offered:   Date FOC received:   Accepting facility:   Date authorization started with reference number:   Date authorization received and expires:   Follow up appointments: PCP and specialist  DME needed: has 02 at home through [employer]  Transportation at discharge: significant other  IM/IMM Medicare/ letter given: sign at DC  Is patient a  and connected with [facility]?    If yes, was  transfer form completed and [facility] notified?   Caregiver Contact: Duke Muniz (Boyfriend)  [phone] (H   Discharge Caregiver contacted prior to discharge?   Care Conference needed?   Barriers to discharge: clinical improvement, planned DC on Tuesday    English [place] DELVALLE CM   [id number]

## 2024-07-01 NOTE — DIABETES MGMT
[facility] FOR DIABETES HEALTH  DIABETES MANAGEMENT CONSULT    Consulted by Provider for advanced nursing evaluation and care for inpatient blood glucose management.    Evaluation and Action Plan   Anette Cannon is a 54 year old female patient with a hx of Type1 diabetes. She follows with an endocrinologist. She reportedly uses 30 units Lantus and 6 units Humalog with each meal + corrective scale insulin. The patient will require basal insulin to keep BG's stable. I have adjusted to 0.3 xkg for now. Tahmina increase to 04.kg if FBG is >180 tomorrow. May need to initiate scheduled meal time if the patient is eating 50% or more of meal tray consistently. The patient was admitted for BLE and SOB. Hx of CHF and COPD. She also found to have a wound on her buttocks. Wound care to follow patient while hospitalized.   Patient smiling and walking in toscano. Bg's are stable. I recommend no changes to the current insulin regimen at this time. In anticipation of discharge the patient can resume PTA insulin dosing and follow-up with her endocrinologist. The patient is amenable to the discahrge plan.     Management Rationale Action Plan   Medication   Basal needs Using 0.3 units/kg/D based on weight    Corrective insulin Using low dose sensitivity based on BG trends    Additional orders          Diabetes Discharge Plan   Medication   Resume PTA insulin dosing and follow-up with endocrinologist   Referral  []        Outpatient diabetes education   Additional orders            Initial Presentation   Anette Cannon is a 54 y.o. female admitted after experiencing Edema and increased SOB. Hx of CHF and COPD. .  LAB: Glucose 35. Creatine 1.75. GFR 34.   CXR:Narrative & Impression  EXAM:  XR CHEST PORTABLE     INDICATION: Short of breath     COMPARISON: 5/28/2024     TECHNIQUE: portable chest AP view     FINDINGS: The cardiac silhouette is within normal limits. The pulmonary  vasculature is within normal limits.      The lungs

## 2024-07-02 VITALS
HEART RATE: 67 BPM | HEIGHT: 65 IN | RESPIRATION RATE: 16 BRPM | SYSTOLIC BLOOD PRESSURE: 107 MMHG | WEIGHT: 173.72 LBS | OXYGEN SATURATION: 96 % | BODY MASS INDEX: 28.94 KG/M2 | TEMPERATURE: 98.5 F | DIASTOLIC BLOOD PRESSURE: 61 MMHG

## 2024-07-02 LAB
ANION GAP SERPL CALC-SCNC: 8 MMOL/L (ref 5–15)
BUN SERPL-MCNC: 37 MG/DL (ref 6–20)
BUN/CREAT SERPL: 22 (ref 12–20)
CALCIUM SERPL-MCNC: 9.5 MG/DL (ref 8.5–10.1)
CHLORIDE SERPL-SCNC: 88 MMOL/L (ref 97–108)
CO2 SERPL-SCNC: 29 MMOL/L (ref 21–32)
CREAT SERPL-MCNC: 1.7 MG/DL (ref 0.55–1.02)
GLUCOSE BLD STRIP.AUTO-MCNC: 125 MG/DL (ref 65–117)
GLUCOSE BLD STRIP.AUTO-MCNC: 152 MG/DL (ref 65–117)
GLUCOSE BLD STRIP.AUTO-MCNC: 255 MG/DL (ref 65–117)
GLUCOSE BLD STRIP.AUTO-MCNC: 330 MG/DL (ref 65–117)
GLUCOSE BLD STRIP.AUTO-MCNC: 332 MG/DL (ref 65–117)
GLUCOSE BLD STRIP.AUTO-MCNC: 347 MG/DL (ref 65–117)
GLUCOSE BLD STRIP.AUTO-MCNC: 517 MG/DL (ref 65–117)
GLUCOSE BLD STRIP.AUTO-MCNC: 524 MG/DL (ref 65–117)
GLUCOSE BLD STRIP.AUTO-MCNC: 525 MG/DL (ref 65–117)
GLUCOSE SERPL-MCNC: 544 MG/DL (ref 65–100)
POTASSIUM SERPL-SCNC: 4.4 MMOL/L (ref 3.5–5.1)
SERVICE CMNT-IMP: ABNORMAL
SODIUM SERPL-SCNC: 125 MMOL/L (ref 136–145)

## 2024-07-02 PROCEDURE — 6360000002 HC RX W HCPCS: Performed by: STUDENT IN AN ORGANIZED HEALTH CARE EDUCATION/TRAINING PROGRAM

## 2024-07-02 PROCEDURE — 6370000000 HC RX 637 (ALT 250 FOR IP): Performed by: INTERNAL MEDICINE

## 2024-07-02 PROCEDURE — 6360000002 HC RX W HCPCS: Performed by: INTERNAL MEDICINE

## 2024-07-02 PROCEDURE — 6370000000 HC RX 637 (ALT 250 FOR IP)

## 2024-07-02 PROCEDURE — 80048 BASIC METABOLIC PNL TOTAL CA: CPT

## 2024-07-02 PROCEDURE — 94640 AIRWAY INHALATION TREATMENT: CPT

## 2024-07-02 PROCEDURE — 82962 GLUCOSE BLOOD TEST: CPT

## 2024-07-02 PROCEDURE — P9047 ALBUMIN (HUMAN), 25%, 50ML: HCPCS | Performed by: INTERNAL MEDICINE

## 2024-07-02 PROCEDURE — 36415 COLL VENOUS BLD VENIPUNCTURE: CPT

## 2024-07-02 PROCEDURE — 2580000003 HC RX 258: Performed by: STUDENT IN AN ORGANIZED HEALTH CARE EDUCATION/TRAINING PROGRAM

## 2024-07-02 PROCEDURE — 6370000000 HC RX 637 (ALT 250 FOR IP): Performed by: STUDENT IN AN ORGANIZED HEALTH CARE EDUCATION/TRAINING PROGRAM

## 2024-07-02 RX ORDER — BUMETANIDE 1 MG/1
2 TABLET ORAL 2 TIMES DAILY
Status: DISCONTINUED | OUTPATIENT
Start: 2024-07-02 | End: 2024-07-02 | Stop reason: HOSPADM

## 2024-07-02 RX ORDER — INSULIN LISPRO 100 [IU]/ML
15 INJECTION, SOLUTION INTRAVENOUS; SUBCUTANEOUS ONCE
Status: COMPLETED | OUTPATIENT
Start: 2024-07-02 | End: 2024-07-02

## 2024-07-02 RX ORDER — BUMETANIDE 2 MG/1
2 TABLET ORAL 2 TIMES DAILY
Qty: 60 TABLET | Refills: 0 | Status: SHIPPED | OUTPATIENT
Start: 2024-07-02 | End: 2024-08-01

## 2024-07-02 RX ORDER — BUMETANIDE 2 MG/1
3 TABLET ORAL 2 TIMES DAILY
Qty: 90 TABLET | Refills: 0 | Status: SHIPPED | OUTPATIENT
Start: 2024-07-02 | End: 2024-07-02

## 2024-07-02 RX ADMIN — ALBUMIN (HUMAN) 12.5 G: 0.25 INJECTION, SOLUTION INTRAVENOUS at 09:40

## 2024-07-02 RX ADMIN — METOCLOPRAMIDE 5 MG: 10 TABLET ORAL at 09:26

## 2024-07-02 RX ADMIN — DULOXETINE HYDROCHLORIDE 20 MG: 20 CAPSULE, DELAYED RELEASE ORAL at 09:27

## 2024-07-02 RX ADMIN — THERA TABS 1 TABLET: TAB at 09:25

## 2024-07-02 RX ADMIN — BUSPIRONE HYDROCHLORIDE 15 MG: 5 TABLET ORAL at 09:26

## 2024-07-02 RX ADMIN — BUSPIRONE HYDROCHLORIDE 15 MG: 5 TABLET ORAL at 15:32

## 2024-07-02 RX ADMIN — OXYCODONE 5 MG: 5 TABLET ORAL at 11:40

## 2024-07-02 RX ADMIN — ENOXAPARIN SODIUM 40 MG: 100 INJECTION SUBCUTANEOUS at 09:30

## 2024-07-02 RX ADMIN — BUMETANIDE 2 MG: 1 TABLET ORAL at 09:25

## 2024-07-02 RX ADMIN — BREXPIPRAZOLE 2 MG: 1 TABLET ORAL at 09:27

## 2024-07-02 RX ADMIN — SODIUM CHLORIDE, PRESERVATIVE FREE 10 ML: 5 INJECTION INTRAVENOUS at 09:42

## 2024-07-02 RX ADMIN — INSULIN GLARGINE 24 UNITS: 100 INJECTION, SOLUTION SUBCUTANEOUS at 09:29

## 2024-07-02 RX ADMIN — CITALOPRAM HYDROBROMIDE 40 MG: 20 TABLET ORAL at 09:24

## 2024-07-02 RX ADMIN — INSULIN LISPRO 1 UNITS: 100 INJECTION, SOLUTION INTRAVENOUS; SUBCUTANEOUS at 10:00

## 2024-07-02 RX ADMIN — INSULIN LISPRO 15 UNITS: 100 INJECTION, SOLUTION INTRAVENOUS; SUBCUTANEOUS at 11:48

## 2024-07-02 RX ADMIN — INSULIN LISPRO 3 UNITS: 100 INJECTION, SOLUTION INTRAVENOUS; SUBCUTANEOUS at 10:01

## 2024-07-02 RX ADMIN — MORPHINE SULFATE 90 MG: 30 TABLET, FILM COATED, EXTENDED RELEASE ORAL at 09:23

## 2024-07-02 RX ADMIN — PANTOPRAZOLE SODIUM 40 MG: 40 TABLET, DELAYED RELEASE ORAL at 06:39

## 2024-07-02 RX ADMIN — ASPIRIN 81 MG: 81 TABLET, COATED ORAL at 09:24

## 2024-07-02 RX ADMIN — IPRATROPIUM BROMIDE 0.5 MG: 0.5 SOLUTION RESPIRATORY (INHALATION) at 07:52

## 2024-07-02 RX ADMIN — Medication 5000 UNITS: at 09:59

## 2024-07-02 RX ADMIN — ROSUVASTATIN CALCIUM 40 MG: 40 TABLET, FILM COATED ORAL at 09:27

## 2024-07-02 RX ADMIN — ALPRAZOLAM 2 MG: 0.5 TABLET ORAL at 04:10

## 2024-07-02 ASSESSMENT — PAIN DESCRIPTION - DESCRIPTORS: DESCRIPTORS: THROBBING

## 2024-07-02 ASSESSMENT — PAIN DESCRIPTION - ORIENTATION: ORIENTATION: INNER

## 2024-07-02 ASSESSMENT — PAIN DESCRIPTION - LOCATION: LOCATION: OTHER (COMMENT)

## 2024-07-02 ASSESSMENT — PAIN SCALES - GENERAL: PAINLEVEL_OUTOF10: 8

## 2024-07-02 NOTE — PLAN OF CARE
Problem: Discharge Planning  Goal: Discharge to home or other facility with appropriate resources  Outcome: Progressing     Problem: Pain  Goal: Verbalizes/displays adequate comfort level or baseline comfort level  Outcome: Progressing     Problem: ABCDS Injury Assessment  Goal: Absence of physical injury  Outcome: Progressing     Problem: Safety - Adult  Goal: Free from fall injury  Outcome: Progressing     
  Problem: Discharge Planning  Goal: Discharge to home or other facility with appropriate resources  Outcome: Progressing     Problem: Pain  Goal: Verbalizes/displays adequate comfort level or baseline comfort level  Outcome: Progressing     Problem: Safety - Adult  Goal: Free from fall injury  Outcome: Progressing     
  Problem: Discharge Planning  Goal: Discharge to home or other facility with appropriate resources  Outcome: Progressing  Flowsheets (Taken 6/29/2024 0800 by Jackie Samaniego, RN)  Discharge to home or other facility with appropriate resources:   Identify barriers to discharge with patient and caregiver   Arrange for needed discharge resources and transportation as appropriate   Identify discharge learning needs (meds, wound care, etc)     Problem: Pain  Goal: Verbalizes/displays adequate comfort level or baseline comfort level  Outcome: Progressing     Problem: Safety - Adult  Goal: Free from fall injury  Outcome: Progressing     
  Problem: Discharge Planning  Goal: Discharge to home or other facility with appropriate resources  Outcome: Progressing  Flowsheets (Taken 7/1/2024 2000)  Discharge to home or other facility with appropriate resources:   Identify barriers to discharge with patient and caregiver   Arrange for needed discharge resources and transportation as appropriate   Identify discharge learning needs (meds, wound care, etc)   Refer to discharge planning if patient needs post-hospital services based on physician order or complex needs related to functional status, cognitive ability or social support system     Problem: Pain  Goal: Verbalizes/displays adequate comfort level or baseline comfort level  Outcome: Progressing  Flowsheets (Taken 7/1/2024 2345)  Verbalizes/displays adequate comfort level or baseline comfort level:   Encourage patient to monitor pain and request assistance   Assess pain using appropriate pain scale     Problem: ABCDS Injury Assessment  Goal: Absence of physical injury  Outcome: Progressing  Flowsheets (Taken 7/1/2024 2345)  Absence of Physical Injury: Implement safety measures based on patient assessment     Problem: Safety - Adult  Goal: Free from fall injury  Outcome: Progressing  Flowsheets (Taken 7/1/2024 2345)  Free From Fall Injury: Instruct family/caregiver on patient safety     Problem: Respiratory - Adult  Goal: Achieves optimal ventilation and oxygenation  Outcome: Progressing  Flowsheets (Taken 6/28/2024 1940 by Dominique Sellers RN)  Achieves optimal ventilation and oxygenation: Assess for changes in respiratory status     Problem: Nutrition Deficit:  Goal: Optimize nutritional status  Outcome: Progressing  Flowsheets (Taken 7/1/2024 2345)  Nutrient intake appropriate for improving, restoring, or maintaining nutritional needs:   Assess nutritional status and recommend course of action   Monitor oral intake, labs, and treatment plans     Problem: Chronic Conditions and Co-morbidities  Goal: 
RN  Outcome: Progressing  Flowsheets (Taken 6/28/2024 1940 by Dominique Sellers RN)  Achieves optimal ventilation and oxygenation: Assess for changes in respiratory status     Problem: Nutrition Deficit:  Goal: Optimize nutritional status  7/2/2024 0813 by Mariah Dutton RN  Outcome: Progressing  7/1/2024 2345 by Brenda Marvin RN  Outcome: Progressing  Flowsheets (Taken 7/1/2024 2345)  Nutrient intake appropriate for improving, restoring, or maintaining nutritional needs:   Assess nutritional status and recommend course of action   Monitor oral intake, labs, and treatment plans     Problem: Chronic Conditions and Co-morbidities  Goal: Patient's chronic conditions and co-morbidity symptoms are monitored and maintained or improved  7/2/2024 0813 by Mariah Dutton RN  Outcome: Progressing  7/1/2024 2345 by Brenda Marvin RN  Outcome: Progressing  Flowsheets (Taken 7/1/2024 2000)  Care Plan - Patient's Chronic Conditions and Co-Morbidity Symptoms are Monitored and Maintained or Improved:   Monitor and assess patient's chronic conditions and comorbid symptoms for stability, deterioration, or improvement   Collaborate with multidisciplinary team to address chronic and comorbid conditions and prevent exacerbation or deterioration   Update acute care plan with appropriate goals if chronic or comorbid symptoms are exacerbated and prevent overall improvement and discharge

## 2024-07-02 NOTE — CARE COORDINATION
CM Note    Reservation # [id number]   [facility] is the company to transport  Transport # is [phone] and it can take up to 3 hours to arrive  They will text patient when they are almost here and give instructions as to where they want to meet her, in the room or in front.    [employer] [place] CM   [id number]

## 2024-07-02 NOTE — PROGRESS NOTES
Hospitalist Progress Note    NAME: Anette Cannon   : 1970   MRN: [id number]     Date/Time: 2024 3:34 PM  Patient PCP: Jair Vieira MD    Assessment / Plan:     LE edema, weight gain, shortness of breath  Acute on chronic HFpEF - EF 55-60% 3/2024  History of pulmonary hypertension  - Quite poor historian, unclear timeline of events. Complaints of \"30lb weight gain in 3 days\", which is likely not accurate but can see that had documented weight of 156lb on  ED visit. Also endorses SOB, increasing LE edema.   - Hx of HFpEF, had RHC 3/2024 with borderline filling pressures, pulmonary HTN  -  (chronically 200s-600s it seems), CXR clear, does have 2+ LE pitting edema   - BL doppler no DVT  - continue IV Bumex 2mg BID  - Low Na diet, 1500cc fluid restriction, strict I/Os      PERLA on CKD  - Cr 1.75 from baseline 1.3 - 1.5 most often  - Does clinically appear overloaded although BNP not significant elevated and CXR is clear  - follow Cr while on IV diuretics     Polypharmacy  Chronic opiate dependency  Chronic benzodiazepine use   - On multiple high risk medications including long acting morphine which she states takes BID and PRN oxycodone as well as 2mg of Xanax which she takes 4 times per day on top of multiple other medications  - She has had multiple falls related to polypharmacy in the past  - We do not stock her ER morphine so discussed with pharmacy and will change to nearest dose here which is 90 IR BID.  Continue this scheduled  - she has requested her oxycodone prn.  Ordered 2.5mg instead of 5mg  - Scale back xanax from QID to BID PRN - HOLD for any sedation      Type 1 DM  - Home regimen is lantus 30u qd and sliding scale  - Persistent hypoglycemia here  - states took home insulin and then only had yogurt  - restart Lantus lower dose with premeal SSI prn  - consult DTC.  She follows with endocrine     MDD/CHANA/psychiatric disturbance  Continue PTA Rexulti, BuSpar, citalopram, 
      Hospitalist Progress Note    NAME: Anette Cannon   : 1970   MRN: [id number]     Date/Time: 2024 7:52 AM  Patient PCP: Jair Vieira MD    Assessment / Plan:     Acute on chronic HFpEF - EF 55-60% 3/2024  History of pulmonary hypertension  - worsening LE edema, weight gain, shortness of breath  - Quite poor historian, unclear timeline of events. Complaints of \"30lb weight gain in 3 days\", which is likely not accurate but can see that had documented weight of 156lb on  ED visit. Also endorses SOB, increasing LE edema.   - Hx of HFpEF, had RHC 3/2024 with borderline filling pressures, pulmonary HTN  -  (chronically 200s-600s it seems), CXR clear, does have 2+ LE pitting edema   - BL doppler no DVT  - Low Na diet, 1500cc fluid restriction, strict I/Os   - change bumex to 1mg Q6 from 2mg BID.   Had low BP and needed midodrine.     PERLA on CKD  - Cr 1.75 from baseline 1.3 - 1.5 most often  - Does clinically appear overloaded although BNP not significant elevated and CXR is clear  - follow Cr while on IV diuretics     Polypharmacy  Chronic opiate dependency  Chronic benzodiazepine use   - On multiple high risk medications including long acting morphine which she states takes BID and PRN oxycodone as well as 2mg of Xanax which she takes 4 times per day on top of multiple other medications  - She has had multiple falls related to polypharmacy in the past  - We do not stock her ER morphine so discussed with pharmacy and will change to nearest dose here which is 90 IR BID.  Continue this scheduled  - she has requested her oxycodone prn.  Ordered 2.5mg instead of 5mg  - Scale back xanax from QID to BID PRN - HOLD for any sedation      Type 1 DM, uncontrolled with episodes of hypoglycemia  - Home regimen is lantus 30u qd and sliding scale  - episodes of hypoglycemia  and    - restarted Lantus lower dose of 15 with premeal SSI prn  - consult DTC.  She follows with endocrine - Dr Schmitt 
0700 assumed care  1120 Blood sugar 524 MD notified order of 15 units of lispro   1540 Discharged with all personal belongings   
0730: patient has prescribed narcotics at bedside in her purse. Educated patient to not take the medications. Pharmacist came to bedside and counted her medications and told her we will store these medications in a safe and she can get them when she gets discharge.     
1900H: Bedside shift change report given to JOSEF DELVALLE (oncoming nurse) by ALYSON DELVALLE (offgoing nurse). Report included the following information Nurse Handoff Report, Intake/Output, MAR, Recent Results, Med Rec Status, and Cardiac Rhythm SINUS RHYTHM .      0120H: Patient verbalizes she can't go back to sleep and she's feeling anxious. Patient is requesting for Ativan as it was given to her last night for the same reason. Informed hospitalist Etienne Kerr via [employer]. Awaiting for response.    Explained to patient she just had Xanax few hours ago.     Melatonin PRN PO ordered and given to patient.    Reinforced 1500ml fluid restriction; morning weight 84.5 kg.    End of Shift Note    Bedside shift change report given to SHARAN DELVALLE (oncoming nurse) by Josef Myers RN (offgoing nurse).  Report included the following information SBAR, Intake/Output, MAR, Recent Results, Med Rec Status, and Cardiac Rhythm SINUS RHYTHM    Shift worked:  1900H-0730H     Shift summary and any significant changes:     See above notes     Concerns for physician to address:       Zone phone for oncoming shift:          Activity:     Number times ambulated in hallways past shift: 0  Number of times OOB to chair past shift: 0    Cardiac:   Cardiac Monitoring: Yes           Access:  Current line(s): PIV     Genitourinary:   Urinary status: voiding    Respiratory:      Chronic home O2 use?: NO  Incentive spirometer at bedside: N/A       GI:     Current diet:  ADULT ORAL NUTRITION SUPPLEMENT; Breakfast, Dinner; Standard 4 oz Oral Supplement  ADULT DIET; Regular; 4 carb choices (60 gm/meal); No Added Salt (3-4 gm); 1500 ml  Passing flatus: YES  Tolerating current diet: YES       Pain Management:   Patient states pain is manageable on current regimen: YES    Skin:     Interventions: increase time out of bed    Patient Safety:  Fall Score:    Interventions: assistive device (walker, cane. etc), gripper socks, and pt to call 
1900H: Bedside shift change report given to JOSEF DELVALLE (oncoming nurse) by SOLE DELVALLE (offgoing nurse). Report included the following information Nurse Handoff Report, Intake/Output, MAR, Recent Results, Med Rec Status, and Cardiac Rhythm SINUS RHYTHM .      End of Shift Note    Bedside shift change report given to MARLINE GONZÁLES RN (oncoming nurse) by Josef Myers RN (offgoing nurse).  Report included the following information SBAR, Intake/Output, MAR, Recent Results, Med Rec Status, and Cardiac Rhythm SINUS RHYTHM    Shift worked:  1900H-0730H     Shift summary and any significant changes:     Patient more complaint on fluid restriction overnight; weight taken in the morning 82.1 kg     Concerns for physician to address:       Zone phone for oncoming shift:          Activity:     Number times ambulated in hallways past shift: 0  Number of times OOB to chair past shift: 0    Cardiac:   Cardiac Monitoring: Yes           Access:  Current line(s): PIV     Genitourinary:   Urinary status: voiding    Respiratory:      Chronic home O2 use?: NO  Incentive spirometer at bedside: N/A       GI:     Current diet:  ADULT ORAL NUTRITION SUPPLEMENT; Breakfast, Dinner; Standard 4 oz Oral Supplement  ADULT DIET; Regular; 4 carb choices (60 gm/meal); No Added Salt (3-4 gm); 1500 ml  Passing flatus: YES  Tolerating current diet: YES       Pain Management:   Patient states pain is manageable on current regimen: YES    Skin:     Interventions: increase time out of bed, internal/external urinary devices, and nutritional support    Patient Safety:  Fall Score:    Interventions: bed/chair alarm, assistive device (walker, cane. etc), gripper socks, and pt to call before getting OOB       Length of Stay:  Expected LOS: 5  Actual LOS: 5      Josef Myers RN                           
2400  Patient stated her blood sugar was low, so I checked it and it was 48..  Juice was given and a pack of cracker were eaten and bloodsugar was rechecked again. Second blood sugar was 54. Gave patient more juice and crackers and glucose tablets and blood sugar was taken again. Third blood sugar was 74. At this time I cooked a meal for the patient to eat in order to give her  more stable blood sugar.I will continue to monitor for the remainder of the shift.  
Attempted to schedule Cardio hospital follow up. Sent Cardio office a message, awaiting return call from PCP office with appt information. [facility] placed [facility] information AVS for patient resource.  Pending patient discharge. Carin Barahona, Care Management Assistant   
Comprehensive Nutrition Assessment    Type and Reason for Visit:  Initial, Consult    Nutrition Recommendations/Plan:   Consistent carb diet (up to 60g/meal), no added salt, 1500mL fluid restriction. Pt requested easy to chew diet d/t lack of bottom dentition.   Ensure compact BID per pt request.  Please document % meals and supplements consumed in flowsheet I/O's under intake.  Reinforce low sodium diet and fluid restriction recommendations.   Recommend consult to diabetes nurse specialist for guidance on home insulin regimen as pt reports hypoglycemia.      Malnutrition Assessment:  Malnutrition Status:  No malnutrition (06/27/24 1318)        Nutrition Assessment:     Consult received as part of CHF order set. Pt medically noted for BLE edema, wt gain, SOB, CHF, pulmonary HTN, PERLA on CKD, polypharmacy, hypoglycemia, MDD/CHANA/psych hx, DM1. Pt reports she gained about 30lbs over the course of 3 days PTA. She insists that she is compliant with her 1500mL fluid restriction but also admitted to drinking: 3 sodas (355mL each), 3 Equate low carb protein shakes (237-325mL each), 2-3 cups of ice and an unknown amount of water. This is certainly an excessive amount of fluid, even if we don't count the protein shakes. She reports usually eating 2 meals/day (breakfast and dinner). If she has lunch, it would be a tuna or deli turkey sandwich. She also admits to just 1 hotdog for dinner per month. We discussed the need to cut out processed meats d/t the sodium content and will need to be more mindful of her fluid intake as well. Pt requesting a hospital cup with measurements on it to help her keep track of intake as she can no longer read the worn off measurements on her cup at home. Pt will benefit from continued review of diet recommendations.     Wt Readings from Last 5 Encounters:   06/26/24 82.8 kg (182 lb 8.7 oz)   05/28/24 70.8 kg (156 lb 1.4 oz)   05/13/24 75.2 kg (165 lb 12.8 oz)   04/26/24 82 kg (180 lb 12.4 oz) 
End of Shift Note    Bedside shift change report given to Katrina (oncoming nurse) by Yeong AE Jenny, RN (offgoing nurse).  Report included the following information SBAR    Shift worked:  7a - 3p     Shift summary and any significant changes:     Seen by nephrology. IV bumex switched to oral. PRN rey and xanax given once during this shift.     Concerns for physician to address:  none     Zone phone for oncoming shift:          Activity:     Number times ambulated in hallways past shift: 0  Number of times OOB to chair past shift: 1    Cardiac:   Cardiac Monitoring: Yes           Access:  Current line(s): PIV     Genitourinary:   Urinary status: voiding    Respiratory:      Chronic home O2 use?: NO  Incentive spirometer at bedside: N/A       GI:     Current diet:  ADULT ORAL NUTRITION SUPPLEMENT; Breakfast, Dinner; Standard 4 oz Oral Supplement  ADULT DIET; Regular; 4 carb choices (60 gm/meal); No Added Salt (3-4 gm); 1500 ml  Passing flatus: YES  Tolerating current diet: YES       Pain Management:   Patient states pain is manageable on current regimen: YES    Skin:     Interventions: float heels and increase time out of bed    Patient Safety:  Fall Score:    Interventions: gripper socks, pt to call before getting OOB, and stay with me (per policy)       Length of Stay:  Expected LOS: 6  Actual LOS: 5      Yeong AE Jenny, RN                            
End of Shift Note    Bedside shift change report given to Roseline (oncoming nurse) by Yeong AE Jenny, RN (offgoing nurse).  Report included the following information SBAR    Shift worked:  7a - 7p     Shift summary and any significant changes:     Patient signed the bed alarm refusal form. All of scheduled meds given. PRN xanax given twice and roxicodone given once during this shift. Patient has been sitting up in the recliner all day. Lantus was changed to 24 units from 15 units. Seen by wound care nurse, wound care order is in.      Concerns for physician to address:  none     Zone phone for oncoming shift:          Activity:     Number times ambulated in hallways past shift: 0  Number of times OOB to chair past shift: 1    Cardiac:   Cardiac Monitoring: Yes           Access:  Current line(s): PIV     Genitourinary:   Urinary status: voiding    Respiratory:      Chronic home O2 use?: NO  Incentive spirometer at bedside: N/A       GI:     Current diet:  ADULT ORAL NUTRITION SUPPLEMENT; Breakfast, Dinner; Standard 4 oz Oral Supplement  ADULT DIET; Regular; 4 carb choices (60 gm/meal); No Added Salt (3-4 gm); 1500 ml  Passing flatus: YES  Tolerating current diet: YES       Pain Management:   Patient states pain is manageable on current regimen: YES    Skin:     Interventions: float heels and increase time out of bed    Patient Safety:  Fall Score:    Interventions: gripper socks, pt to call before getting OOB, and stay with me (per policy)       Length of Stay:  Expected LOS: 5  Actual LOS: 2      Yeong AE Jenny, RN                            
Hospital follow-up PCP transitional care appointment has been scheduled with Dr. Jair Vieira on 7/10/24 at 0900 . This is a previously scheduled appt. [facility] placed [facility] information AVS for patient resource. Pending patient discharge.  Carin Barahona, Care Management Assistant     
Nephrology Progress Note  [facility] [place] [facility]  [address]  [place] [address]  Phone - [phone]  Fax - [phone]                 Patient: Anetet Cannon                     YOB: 1970        Date- 7/1/2024                                     Admit Date: 6/26/2024   CC: Follow up for hypervolemia          IMPRESSION & PLAN:   Fluid overload, hypervolemia  CKD stage IIIb(baseline creatinine 1.3-1.7)(followed by Dr. Castro)  History of hyponatremia  Type 2 diabetes  Hypertension  Medical noncompliance      PLAN-  Change Bumex to 3 mg p.o. twice daily(home dose is 2 mg twice daily)  Check weights daily(continues to lose weight since admission)  Check labs daily  Will follow with you     Subjective:  Interval History:   -Seen and examined today  -Feels much better  -Swelling has improved    Objective:   Vitals:    06/30/24 2255 07/01/24 0350 07/01/24 0517 07/01/24 0727   BP: (!) 104/54 (!) 118/58  (!) 127/58   Pulse: 66 64  63   Resp:  16  16   Temp: 98.1 °F (36.7 °C) 98 °F (36.7 °C)  97.9 °F (36.6 °C)   TempSrc: Oral Oral  Oral   SpO2: 92%   96%   Weight:   82.1 kg (181 lb)    Height:          I/O last 3 completed shifts:  In: 2278 [P.O.:2278]  Out: 6075 [Urine:6075]  I/O this shift:  In: 120 [P.O.:120]  Out: -       Physical exam:    GEN: NAD  NECK- no mass  RESP: No wheezing, decreased BS b/l  CVS: S1,S2  RRR  NEURO: Normal speech, Non focal  EXT: No Edema   PSYCH: Normal Mood    Chart reviewed.         Pertinent Notes reviewed.     Data Review :  Lab Results   Component Value Date/Time     07/01/2024 03:55 AM    K 3.7 07/01/2024 03:55 AM    CL 98 07/01/2024 03:55 AM    CO2 36 07/01/2024 03:55 AM    BUN 33 07/01/2024 03:55 AM    CREATININE 1.42 07/01/2024 03:55 AM    GLUCOSE 118 07/01/2024 03:55 AM    GLUCOSE 274 05/21/2024 02:57 PM    CALCIUM 8.7 07/01/2024 03:55 AM       Lab Results   Component Value 
Rounded on Gnosticist patients and provided Anointing of the Sick at request of patient/family.  
Spiritual Care Assessment/Progress Note  [facility]    Name: Anette Cannon MRN: [id number]    Age: 54 y.o.     Sex: female   Language: English     Date: 6/27/2024            Total Time Calculated: 49 min              Spiritual Assessment begun in [facility] 2 CARDIOPULMONARY CARE  Service Provided For: Patient  Referral/Consult From: Rounding  Encounter Overview/Reason: Initial Encounter    Spiritual beliefs:      [x] Involved in a tee tradition/spiritual practice:      [] Supported by a tee community:      [] Claims no spiritual orientation:      [] Seeking spiritual identity:           [] Adheres to an individual form of spirituality:      [] Not able to assess:                Identified resources for coping and support system:   Support System: Significant other, Friends/neighbors       [x] Prayer                  [] Devotional reading               [] Music                  [] Guided Imagery     [] Pet visits                                        [] Other: (COMMENT)     Specific area/focus of visit   Encounter:    Crisis:    Spiritual/Emotional needs: Type: Spiritual Support  Ritual, Rites and Sacraments:    Grief, Loss, and Adjustments:    Ethics/Mediation:    Behavioral Health:    Palliative Care:    Advance Care Planning:      Plan/Referrals: Continue Support (comment)    Narrative:    Reviewed chart prior to visit on [facility] unit for spiritual assessment and support.  No family/friends present. Ms Cannon was seated in recliner appearing to be in good spirits and was welcoming of visit.   introduced self and role; offered supportive listening presence as Ms Cannon spoke about multiple current medical challenges.  She has a very supportive boyfriend, Duke.  She shared they recently moved into a new home so she has been keeping busy unpacking and organizing the home.  They a a dog and she enjoys playing with him.  She does not attend a [employer] at this time, but has a preacher friend who 
  MDD/CHANA/psychiatric disturbance  Continue PTA Rexulti, BuSpar, citalopram, duloxetine       Medical Decision Making:   I personally reviewed labs:  CBC, BMP  I personally reviewed imaging:  Toxic drug monitoring:   Discussed case with: , lead RN during today's interdisciplinary rounds.    Central Line:      Code status: Full  Prophylaxis: Lovenox    Subjective:     Chief Complaint / Reason for Physician Visit  Follow up of CHF, volume overload, PERLA, Chronic opioid dependence, DM1  Discussed with RN events overnight.   BP dropped overnight.  BG dropped overnight also.       Objective:     VITALS:   Most recent are:  Visit Vitals  /75   Pulse 61   Temp 97.2 °F (36.2 °C) (Oral)   Resp 16   Ht 1.651 m (5' 5\")   Wt 83.9 kg (184 lb 15.5 oz)   SpO2 94%   BMI 30.78 kg/m²       I had a face to face encounter and independently examined this patient on 6/29/2024, as outlined below:  PHYSICAL EXAM:  General: WD, WN. Alert, cooperative, no acute distress    EENT:  EOMI. Anicteric sclerae. MMM  Resp:  CTA bilaterally, no wheezing or rales.  No accessory muscle use  CV:  Regular  rhythm,  (+) LE pitting edema  GI:  Soft, Non distended, Non tender.  +Bowel sounds  Neurologic:  Alert and oriented X self, place, situation, normal speech,   Psych:   Good insight. Not anxious nor agitated  Skin:  No rashes.  No jaundice    Reviewed most current lab test results and cultures  YES  Reviewed most current radiology test results   YES  Review and summation of old records today    NO  Reviewed patient's current orders and MAR    YES  PMH/SH reviewed - no change compared to H&P    ________________________________________________________________________  Care Plan discussed with:    Comments   Patient x    Family      RN x    Care Manager     Consultant                        Multidiciplinary team rounds were held today with , nursing, pharmacist and clinical coordinator.  Patient's plan of care was discussed; 
06/26/2024    HGB 11.2 (L) 06/26/2024    HCT 34.5 (L) 06/26/2024    MCV 86.0 06/26/2024     06/26/2024      Recent Labs     06/30/24  0226 07/01/24  0355    137   K 4.8 3.7    98   CO2 31 36*   GLUCOSE 254* 118*   BUN 29* 33*   CREATININE 1.31* 1.42*   CALCIUM 7.4* 8.7       No results for input(s): \"WBC\", \"RBC\", \"HGB\", \"HCT\", \"MCV\", \"MCH\", \"MCHC\", \"RDW\", \"PLT\", \"MPV\" in the last 72 hours.  Lab Results   Component Value Date/Time    IRON 17 (L) 06/26/2023 05:13 PM    TIBC 345 06/26/2023 05:13 PM     No results found for: \"PTH\"  Lab Results   Component Value Date/Time    LABA1C 8.8 (H) 05/21/2024 02:57 PM     Lab Results   Component Value Date/Time    COLORU YELLOW/STRAW 04/24/2024 11:39 PM    CLARITYU CLOUDY (A) 04/25/2023 01:47 PM    GLUCOSEU Negative 04/24/2024 11:39 PM    BILIRUBINUR Negative 04/24/2024 11:39 PM    BILIRUBINUR Negative 04/25/2023 01:47 PM    KETUA Negative 04/24/2024 11:39 PM    BLOODU Negative 04/24/2024 11:39 PM    PHUR 5.0 04/24/2024 11:39 PM    PHUR 5.0 04/25/2023 01:47 PM    PROTEINU Negative 04/24/2024 11:39 PM    NITRU Negative 04/24/2024 11:39 PM    LEUKOCYTESUR MODERATE (A) 04/24/2024 11:39 PM     US Results (most recent):  Medication list  reviewed  Current Facility-Administered Medications   Medication Dose Route Frequency    bumetanide (BUMEX) tablet 2 mg  2 mg Oral BID    melatonin tablet 3 mg  3 mg Oral Nightly PRN    albumin human 25% IV solution 25 g  25 g IntraVENous BID    oxyCODONE (ROXICODONE) immediate release tablet 5 mg  5 mg Oral Q4H PRN    insulin lispro (HUMALOG,ADMELOG) injection vial 1-5 Units  1-5 Units SubCUTAneous TID WC    insulin lispro (HUMALOG,ADMELOG) injection vial 0-4 Units  0-4 Units SubCUTAneous TID AC    midodrine (PROAMATINE) tablet 10 mg  10 mg Oral TID PRN    insulin glargine (LANTUS) injection vial 24 Units  24 Units SubCUTAneous Daily    ALPRAZolam (XANAX) tablet 2 mg  2 mg Oral TID PRN    ipratropium (ATROVENT) 0.02 % nebulizer 
pain  - Scale back xanax from QID to BID PRN - HOLD for any sedation      Type 1 DM, uncontrolled with episodes of hypoglycemia  - Home regimen is lantus 30u qd and sliding scale  - episodes of hypoglycemia 6/26 and 6/27   - continue lantus 24 units with premeal SSI prn  - consulted DTC.  She follows with endocrine - Dr Schmitt     MDD/CHANA/psychiatric disturbance  Continue PTA Rexulti, BuSpar, citalopram, duloxetine       Medical Decision Making:   I personally reviewed labs:  CBC, BMP  I personally reviewed imaging:  Toxic drug monitoring:   Discussed case with: , lead RN during today's interdisciplinary rounds.    Central Line:      Code status: Full  Prophylaxis: Lovenox    Subjective:     Chief Complaint / Reason for Physician Visit  Follow up of CHF, volume overload, PERLA, Chronic opioid dependence, DM1  Discussed with RN events overnight.   BP better.  Non compliant with fluid restrictions    Objective:     VITALS:   Most recent are:  Visit Vitals  BP (!) 95/55   Pulse 59   Temp 97.1 °F (36.2 °C) (Oral)   Resp 18   Ht 1.651 m (5' 5\")   Wt 84.5 kg (186 lb 4.6 oz)   SpO2 94%   BMI 31.00 kg/m²       I had a face to face encounter and independently examined this patient on 6/30/2024, as outlined below:  PHYSICAL EXAM:  General: WD, WN. Alert, cooperative, no acute distress    EENT:  EOMI. Anicteric sclerae. MMM  Resp:  CTA bilaterally, no wheezing or rales.  No accessory muscle use  CV:  Regular  rhythm,  (+) LE pitting edema  GI:  Soft, Non distended, Non tender.  +Bowel sounds  Neurologic:  Alert and oriented X self, place, situation, normal speech,   Psych:   Good insight. Not anxious nor agitated  Skin:  No rashes.  No jaundice    Reviewed most current lab test results and cultures  YES  Reviewed most current radiology test results   YES  Review and summation of old records today    NO  Reviewed patient's current orders and MAR    YES  PMH/SH reviewed - no change compared to 
she takes 4 times per day on top of multiple other medications  - She has had multiple falls related to polypharmacy in the past  - We do not stock her ER morphine so discussed with pharmacy and will change to nearest dose here which is 90 IR BID.  Continue this scheduled  - she has requested her oxycodone 5mg prn.  2.5 not holding her pain  - Scale back xanax from QID to BID PRN - HOLD for any sedation      Type 1 DM, uncontrolled with episodes of hypoglycemia  - Home regimen is lantus 30u qd and sliding scale  - episodes of hypoglycemia 6/26 and 6/27   - continue lantus 24 units with premeal SSI prn  - consulted DTC.  She follows with endocrine - Dr Schmitt     MDD/CHANA/psychiatric disturbance  Continue PTA Rexulti, BuSpar, citalopram, duloxetine       Medical Decision Making:   I personally reviewed labs:  CBC, BMP  I personally reviewed imaging:  Toxic drug monitoring:   Discussed case with: , lead RN during today's interdisciplinary rounds.    Central Line:      Code status: Full  Prophylaxis: Lovenox    Subjective:     Chief Complaint / Reason for Physician Visit  Follow up of CHF, volume overload, PERLA, Chronic opioid dependence, DM1  Discussed with RN events overnight.   BP better.  Non compliant with fluid restrictions    Objective:     VITALS:   Most recent are:  Visit Vitals  /70   Pulse 71   Temp 97.8 °F (36.6 °C) (Oral)   Resp 16   Ht 1.651 m (5' 5\")   Wt 82.1 kg (181 lb)   SpO2 94%   BMI 30.12 kg/m²       I had a face to face encounter and independently examined this patient on 7/1/2024, as outlined below:  PHYSICAL EXAM:  General: WD, WN. Alert, cooperative, no acute distress    EENT:  EOMI. Anicteric sclerae. MMM  Resp:  CTA bilaterally, no wheezing or rales.  No accessory muscle use  CV:  Regular  rhythm,  (+) LE pitting edema  GI:  Soft, Non distended, Non tender.  +Bowel sounds  Neurologic:  Alert and oriented X self, place, situation, normal speech,   Psych:   Good insight. Not

## 2024-07-02 NOTE — CARE COORDINATION
07/02/24 1247   Services At/After Discharge   [place] Resource Information Provided? No   Mode of Transport at Discharge Other (see comment)   Confirm Follow Up Transport Family   Condition of Participation: Discharge Planning   The Plan for Transition of Care is related to the following treatment goals: PCP and speicalist     Transport has been arranged to arrive before 3:40 pm. .  Reservation information in prior note. Patient has a key to get into home  She is ambulatory and safe to go home by transport    No further needs from MICHAEL Lindo RN CM   [id number]

## 2024-07-02 NOTE — DISCHARGE SUMMARY
Discharge Summary    Name: Anette Cannon  [id number]  YOB: 1970 (Age: 54 y.o.)   Date of Admission: 6/26/2024  Date of Discharge: 7/2/2024  Attending Physician: Karoline Bennett MD    Discharge Diagnosis:   Acute on chronic HFpEF POA- s/p IV diuresis, cont PO Bumex 2mg BID as recommended with PO fluid restrictions at 2 L/day  History of pulmonary hypertension  Non compliant with fluid restriction-p.o. fluid restriction reinforced again today, patient verbalizes understanding  PERLA on CKD-creatinine slightly up at 1.7 today, but close to baseline, cleared by Nephro to cont Bumex 2mg BID  Polypharmacy  Chronic opiate dependency  Chronic benzodiazepine use   Type 1 DM, uncontrolled with episodes of hypoglycemia  MDD/CHANA/psychiatric disturbance  Full code          Consultations:  IP CONSULT TO DIETITIAN  IP CONSULT TO CARDIAC REHAB  IP CONSULT TO DIABETES MANAGEMENT  IP WOUND CARE NURSE CONSULT TO EVAL  IP CONSULT TO NEPHROLOGY  IP CONSULT TO DIABETES MANAGEMENT      Brief Admission History/Reason for Admission Per Rolo Jacobo MD:   \"54 y.o.  female with PMHx significant for listed PMH below who presents with the above chief complaint.   We were asked to admit for work up and evaluation of the above problems.      Presents with chief complaint of worsening lower extremity edema and weight gain.  She tells me that she has gained 30 pounds which she states has been over the last several days.  Also states that she has noticed that her legs have been swollen and more painful than normal.  Tells me that she has been compliant with her home medications including her home diuretics.  Has not been drinking more water than usual or eating more salt.  Has been feeling more short of breath over the past day.     States that she has chronic pain and that her legs always bother her.  Tells me that she is on long-term opiates including morphine as well as as needed oxycodone

## 2024-07-02 NOTE — DISCHARGE INSTRUCTIONS
HOSPITALIST DISCHARGE INSTRUCTIONS    NAME: Anette Cannon   :  1970   MRN:  [id number]     Date/Time:  2024 1:10 PM    ADMIT DATE: 2024     DISCHARGE DATE: 2024     DISCHARGE DIAGNOSIS:  Acute on chronic HFpEF POA- s/p IV diuresis, cont PO Bumex 2mg BID as recommended with PO fluid restrictions at 2 L/day  History of pulmonary hypertension  Non compliant with fluid restriction-p.o. fluid restriction reinforced again today, patient verbalizes understanding  PERLA on CKD-creatinine slightly up at 1.7 today, but close to baseline, cleared by Nephro to cont Bumex 2mg BID  Polypharmacy  Chronic opiate dependency  Chronic benzodiazepine use   Type 1 DM, uncontrolled with episodes of hypoglycemia  MDD/CHANA/psychiatric disturbance  Full code       MEDICATIONS:  As per medication reconciliation  list  It is important that you take the medication exactly as they are prescribed.   Keep your medication in the bottles provided by the pharmacist and keep a list of the medication names, dosages, and times to be taken in your wallet.   Do not take other medications without consulting your doctor.     Pain Management: per above medications    What to do at Home    Recommended diet:  cardiac diet, diabetic diet, and low fat, low cholesterol diet    Recommended activity: activity as tolerated    If you have questions regarding the hospital related prescriptions or hospital related issues please call at .    If you experience any of the following symptoms then please call your primary care physician or return to the emergency room if you cannot get hold of your doctor:  Fever, chills, nausea, vomiting, diarrhea, change in mentation, falling, bleeding, shortness of breath,     Follow Up:  PCP  you are to call and set up an appointment to see them in 7-10 days.  Your Endocrine Doctor [facility] DM management  Your psychiatry doctor as outpatient      Information obtained by :  I understand that if any

## 2024-07-06 RX ORDER — INSULIN GLARGINE 100 [IU]/ML
INJECTION, SOLUTION SUBCUTANEOUS
Qty: 45 ML | Refills: 3 | Status: SHIPPED | OUTPATIENT
Start: 2024-07-06

## 2024-07-12 ENCOUNTER — TRANSCRIBE ORDERS (OUTPATIENT)
Facility: HOSPITAL | Age: 54
End: 2024-07-12

## 2024-07-12 DIAGNOSIS — M17.11 PRIMARY OSTEOARTHRITIS OF RIGHT KNEE: ICD-10-CM

## 2024-07-12 DIAGNOSIS — M25.461 EFFUSION OF RIGHT KNEE: Primary | ICD-10-CM

## 2024-07-13 ENCOUNTER — HOSPITAL ENCOUNTER (OUTPATIENT)
Facility: HOSPITAL | Age: 54
End: 2024-07-13
Payer: MEDICARE

## 2024-07-13 DIAGNOSIS — M25.461 EFFUSION OF RIGHT KNEE: ICD-10-CM

## 2024-07-13 DIAGNOSIS — M17.11 PRIMARY OSTEOARTHRITIS OF RIGHT KNEE: ICD-10-CM

## 2024-07-13 PROCEDURE — 73700 CT LOWER EXTREMITY W/O DYE: CPT

## 2024-07-19 ENCOUNTER — TELEPHONE (OUTPATIENT)
Age: 54
End: 2024-07-19

## 2024-07-19 NOTE — TELEPHONE ENCOUNTER
Pt LVM apologizing that she missed her appt. Pt states she forgot and [facility] couldn't take her. Pt asked can she be seen if an opening becomes available.    2 seconds or less

## 2024-07-19 NOTE — TELEPHONE ENCOUNTER
Please let her know I'm headed on vacation tomorrow and won't be back until the week of 7/29/24 but I can look for an opening in the 1-3 weeks after I return and offer it to her once it comes available.

## 2024-07-30 NOTE — TELEPHONE ENCOUNTER
Left detailed message asking pt if she can take appt slot per Dr Schmitt. Callback number left, asked pt to return call.

## 2024-08-02 ENCOUNTER — OFFICE VISIT (OUTPATIENT)
Age: 54
End: 2024-08-02

## 2024-08-02 VITALS
HEART RATE: 90 BPM | SYSTOLIC BLOOD PRESSURE: 122 MMHG | BODY MASS INDEX: 31.29 KG/M2 | DIASTOLIC BLOOD PRESSURE: 60 MMHG | WEIGHT: 187.8 LBS | HEIGHT: 65 IN

## 2024-08-02 DIAGNOSIS — E78.2 MIXED HYPERLIPIDEMIA: ICD-10-CM

## 2024-08-02 DIAGNOSIS — I10 ESSENTIAL (PRIMARY) HYPERTENSION: ICD-10-CM

## 2024-08-02 DIAGNOSIS — R94.6 ABNORMAL RESULTS OF THYROID FUNCTION STUDIES: ICD-10-CM

## 2024-08-02 DIAGNOSIS — E10.65 TYPE 1 DIABETES MELLITUS WITH HYPERGLYCEMIA (HCC): Primary | ICD-10-CM

## 2024-08-02 DIAGNOSIS — R79.89 ELEVATED LFTS: ICD-10-CM

## 2024-08-02 LAB — HBA1C MFR BLD: 8.8 %

## 2024-08-02 RX ORDER — KETOROLAC TROMETHAMINE 30 MG/ML
INJECTION, SOLUTION INTRAMUSCULAR; INTRAVENOUS
Qty: 1 EACH | Refills: 0 | Status: SHIPPED | OUTPATIENT
Start: 2024-08-02

## 2024-08-02 RX ORDER — DULOXETIN HYDROCHLORIDE 20 MG/1
20 CAPSULE, DELAYED RELEASE ORAL DAILY
COMMUNITY

## 2024-08-02 RX ORDER — INSULIN GLARGINE 100 [IU]/ML
INJECTION, SOLUTION SUBCUTANEOUS
Qty: 45 ML | Refills: 3 | Status: SHIPPED
Start: 2024-08-02

## 2024-08-02 RX ORDER — ARIPIPRAZOLE 30 MG/1
30 TABLET ORAL DAILY
COMMUNITY
Start: 2024-07-16

## 2024-08-02 RX ORDER — NAFARELIN ACETATE 2 MG/ML
SPRAY, METERED NASAL
COMMUNITY
Start: 2024-05-22

## 2024-08-02 NOTE — PROGRESS NOTES
Chief Complaint   Patient presents with    Diabetes     PCP and pharmacy confirmed     History of Present Illness: Anette Cannon is a 54 y.o. female here for follow up of diabetes.  Weight up 22 lbs since last visit in 5/24.  She was admitted for 6 days at the end of June for increased shortness of breath and weight gain and was given IV diuretics but didn't lose much weight.  She is currently taking bumex 2 mg 1 tab bid down from 2 tabs bid.  She does have some fatigue and hair loss and constipation and cold intolerance and her TSH had been elevated in March so I will repeat this today to see if she needs to start thyroid hormone.  She has been taking lantus 28 units daily and novolog per the scale but tends to hold her novolog when her sugars are closer to 100 and then eat and and not take the novolog until her sugar goes high and then can cause a low.  She never heard from Dr. Herron about her hysterectomy after he and I spoke on the phone in May and has an appointment with him on 9/4/24 but we discussed that it will be nearly impossible to get her A1c consistently under 8% to do the surgery with the widely fluctuating sugars that she has had for the 15 years that I have cared for her and I feel she is stable from an endocrine perspective to proceed with this surgery with her A1c under 9%.  She now has a left toe bunion and will need surgery with Dr. Nolasco but he also wants her A1c under 8% to proceed and feel she is stable from my perspective for this surgery too.  She misplaced her joanne 3 reader in the past 2 days so gave her a new prescription to fill if she can't find it.    she has the following indications to conting treatment with Freestyle Joanne:  1) she has type 1 diabetes (E10.65) and is on an intensive insulin regimen with 4 injections per day  2) she tests her blood sugar 10 times per day and makes treatment decisions off her blood sugar readings and off freestyle joanne sensor readings  3) she

## 2024-08-02 NOTE — PATIENT INSTRUCTIONS
1) Stay on the lantus 28 units daily and the novolog per the scale below:    Eating  Blood sugar  Insulin dose          Less than 110 Eat first, take 5 units (don't skip)    110-150  6 units    151-200  7 units      201-250  8 units     251-300  9 units      301-350  10 units      351-400  11 units  401-450  12 units  451-500  13 units  Over 500  14 units    Not Eating (to correct for high sugars at least 3 hours after last dose of novolog)  Blood sugar  Insulin dose          Less than 200  None    200-275  2 units    276-350  3 units      351-425  4 units     425-475  5 units   476-525       6 units  Over 525  7 units    2) If your sugar reads \"hi\" on the michele, please check it on your finger to decide how much novolog to take.  Do not take a dose of novolog more frequently than every 2.5 hours or you will stack your novolog and cause a low sugar.      3) If you can't find your michele reader, then fill the prescription I gave you today to get a new one.

## 2024-08-03 LAB
ANION GAP SERPL CALC-SCNC: 7 MMOL/L (ref 5–15)
BUN SERPL-MCNC: 21 MG/DL (ref 6–20)
BUN/CREAT SERPL: 14 (ref 12–20)
CALCIUM SERPL-MCNC: 8.8 MG/DL (ref 8.5–10.1)
CHLORIDE SERPL-SCNC: 89 MMOL/L (ref 97–108)
CO2 SERPL-SCNC: 36 MMOL/L (ref 21–32)
CREAT SERPL-MCNC: 1.47 MG/DL (ref 0.55–1.02)
GLUCOSE SERPL-MCNC: 360 MG/DL (ref 65–100)
POTASSIUM SERPL-SCNC: 4 MMOL/L (ref 3.5–5.1)
SODIUM SERPL-SCNC: 132 MMOL/L (ref 136–145)
T4 FREE SERPL-MCNC: 0.8 NG/DL (ref 0.8–1.5)
TSH SERPL DL<=0.05 MIU/L-ACNC: 12.9 UIU/ML (ref 0.36–3.74)

## 2024-08-03 RX ORDER — LEVOTHYROXINE SODIUM 0.1 MG/1
100 TABLET ORAL DAILY
Qty: 90 TABLET | Refills: 3 | Status: SHIPPED | OUTPATIENT
Start: 2024-08-03

## 2024-08-11 ENCOUNTER — TELEPHONE (OUTPATIENT)
Age: 54
End: 2024-08-11

## 2024-08-11 NOTE — PROGRESS NOTES
I received a call from Ms Cannon requesting I inform Dr. Schmitt that she was admitted over the weekend. Her BG were in the 300-500s and she “could not get them down”.  [facility] and is requesting Dr. Johnson call her on Monday.

## 2024-08-12 NOTE — TELEPHONE ENCOUNTER
LVM informing pt that Dr Schmitt received the message regarding her being admitted and asked pt to return if she needs anything from Dr Schmitt at this time. Callback number left.

## 2024-08-13 NOTE — TELEPHONE ENCOUNTER
Pt states she had explosive diarrhea and vomiting and was without medication for 7 days. Pt states she was going through withdrawal and that's why she had to go to the hospital. Pt states once she received the first IV she felt better. Pt thanked staff for checking on her. Pt states she is doing better and will call office if needed.

## 2024-08-20 ENCOUNTER — TELEPHONE (OUTPATIENT)
Age: 54
End: 2024-08-20

## 2024-08-20 NOTE — TELEPHONE ENCOUNTER
Oncall contacted by patient and states feeling more fatigued after starting levothyroxine, she started it for 1 week, states she was advised by her cardiologist to continue taking the medication, denies palpitations, chest pain or lightheadedness, no other symptoms besides fatigue and advised levothyroxine should not make her more fatigued and to continue to take it as it may take several weeks for the symptoms to improve, patient indicates understanding and agrees with plan.

## 2024-09-14 DIAGNOSIS — E10.65 TYPE 1 DIABETES MELLITUS WITH HYPERGLYCEMIA (HCC): ICD-10-CM

## 2024-09-14 DIAGNOSIS — E78.2 MIXED HYPERLIPIDEMIA: ICD-10-CM

## 2024-09-14 DIAGNOSIS — R79.89 ELEVATED LFTS: ICD-10-CM

## 2024-09-14 DIAGNOSIS — I10 ESSENTIAL (PRIMARY) HYPERTENSION: ICD-10-CM

## 2024-09-14 DIAGNOSIS — R94.6 ABNORMAL RESULTS OF THYROID FUNCTION STUDIES: ICD-10-CM

## 2024-09-20 LAB
BUN SERPL-MCNC: 26 MG/DL (ref 6–24)
BUN/CREAT SERPL: 20 (ref 9–23)
CALCIUM SERPL-MCNC: 9.4 MG/DL (ref 8.7–10.2)
CHLORIDE SERPL-SCNC: 82 MMOL/L (ref 96–106)
CO2 SERPL-SCNC: 34 MMOL/L (ref 20–29)
CREAT SERPL-MCNC: 1.28 MG/DL (ref 0.57–1)
EGFRCR SERPLBLD CKD-EPI 2021: 50 ML/MIN/1.73
GLUCOSE SERPL-MCNC: 131 MG/DL (ref 70–99)
POTASSIUM SERPL-SCNC: 3.8 MMOL/L (ref 3.5–5.2)
REPORT: NORMAL
SODIUM SERPL-SCNC: 135 MMOL/L (ref 134–144)
T4 FREE SERPL-MCNC: 1.14 NG/DL (ref 0.82–1.77)
TSH SERPL DL<=0.005 MIU/L-ACNC: 4.49 UIU/ML (ref 0.45–4.5)

## 2024-09-23 RX ORDER — GLUCAGON HYDROCHLORIDE 1 MG
KIT INJECTION
Qty: 2 EACH | Refills: 11 | Status: SHIPPED | OUTPATIENT
Start: 2024-09-23

## 2024-09-23 RX ORDER — GLUCAGON HYDROCHLORIDE 1 MG
1 KIT INJECTION ONCE
Qty: 2 EACH | Refills: 11 | Status: CANCELLED | OUTPATIENT
Start: 2024-09-23 | End: 2024-09-23

## 2024-09-27 ENCOUNTER — HOSPITAL ENCOUNTER (OUTPATIENT)
Facility: HOSPITAL | Age: 54
Discharge: HOME | End: 2024-09-30
Payer: MEDICARE

## 2024-09-27 VITALS
DIASTOLIC BLOOD PRESSURE: 59 MMHG | TEMPERATURE: 98.4 F | HEART RATE: 77 BPM | SYSTOLIC BLOOD PRESSURE: 148 MMHG | WEIGHT: 181.44 LBS | HEIGHT: 63 IN | OXYGEN SATURATION: 94 % | RESPIRATION RATE: 14 BRPM | BODY MASS INDEX: 32.15 KG/M2

## 2024-09-27 LAB
ABO + RH BLD: NORMAL
ALBUMIN SERPL-MCNC: 3.6 G/DL (ref 3.5–5)
ALBUMIN/GLOB SERPL: 0.9 (ref 1.1–2.2)
ALP SERPL-CCNC: 104 U/L (ref 45–117)
ALT SERPL-CCNC: 9 U/L (ref 12–78)
ANION GAP SERPL CALC-SCNC: 5 MMOL/L (ref 2–12)
APPEARANCE UR: CLEAR
AST SERPL-CCNC: 20 U/L (ref 15–37)
BACTERIA URNS QL MICRO: NEGATIVE /HPF
BILIRUB SERPL-MCNC: 0.4 MG/DL (ref 0.2–1)
BILIRUB UR QL: NEGATIVE
BLOOD GROUP ANTIBODIES SERPL: NORMAL
BUN SERPL-MCNC: 35 MG/DL (ref 6–20)
BUN/CREAT SERPL: 22 (ref 12–20)
CALCIUM SERPL-MCNC: 8.6 MG/DL (ref 8.5–10.1)
CHLORIDE SERPL-SCNC: 89 MMOL/L (ref 97–108)
CO2 SERPL-SCNC: 36 MMOL/L (ref 21–32)
COLOR UR: ABNORMAL
CREAT SERPL-MCNC: 1.6 MG/DL (ref 0.55–1.02)
EPITH CASTS URNS QL MICRO: ABNORMAL /LPF
ERYTHROCYTE [DISTWIDTH] IN BLOOD BY AUTOMATED COUNT: 17.4 % (ref 11.5–14.5)
GLOBULIN SER CALC-MCNC: 3.8 G/DL (ref 2–4)
GLUCOSE SERPL-MCNC: 216 MG/DL (ref 65–100)
GLUCOSE UR STRIP.AUTO-MCNC: NEGATIVE MG/DL
HCT VFR BLD AUTO: 33.6 % (ref 35–47)
HGB BLD-MCNC: 10.1 G/DL (ref 11.5–16)
HGB UR QL STRIP: NEGATIVE
HYALINE CASTS URNS QL MICRO: ABNORMAL /LPF (ref 0–2)
INR PPP: 1.1 (ref 0.9–1.1)
KETONES UR QL STRIP.AUTO: NEGATIVE MG/DL
LEUKOCYTE ESTERASE UR QL STRIP.AUTO: ABNORMAL
MCH RBC QN AUTO: 23.7 PG (ref 26–34)
MCHC RBC AUTO-ENTMCNC: 30.1 G/DL (ref 30–36.5)
MCV RBC AUTO: 78.7 FL (ref 80–99)
NITRITE UR QL STRIP.AUTO: NEGATIVE
NRBC # BLD: 0 K/UL (ref 0–0.01)
NRBC BLD-RTO: 0 PER 100 WBC
PH UR STRIP: 7.5 (ref 5–8)
PLATELET # BLD AUTO: 246 K/UL (ref 150–400)
PMV BLD AUTO: 9.9 FL (ref 8.9–12.9)
POTASSIUM SERPL-SCNC: 3.4 MMOL/L (ref 3.5–5.1)
PROT SERPL-MCNC: 7.4 G/DL (ref 6.4–8.2)
PROT UR STRIP-MCNC: NEGATIVE MG/DL
PROTHROMBIN TIME: 11.7 SEC (ref 9–11.1)
RBC # BLD AUTO: 4.27 M/UL (ref 3.8–5.2)
RBC #/AREA URNS HPF: ABNORMAL /HPF (ref 0–5)
SODIUM SERPL-SCNC: 130 MMOL/L (ref 136–145)
SP GR UR REFRACTOMETRY: 1.01
SPECIMEN EXP DATE BLD: NORMAL
URINE CULTURE IF INDICATED: ABNORMAL
UROBILINOGEN UR QL STRIP.AUTO: 0.2 EU/DL (ref 0.2–1)
WBC # BLD AUTO: 6.4 K/UL (ref 3.6–11)
WBC URNS QL MICRO: ABNORMAL /HPF (ref 0–4)

## 2024-09-27 PROCEDURE — 86850 RBC ANTIBODY SCREEN: CPT

## 2024-09-27 PROCEDURE — 86901 BLOOD TYPING SEROLOGIC RH(D): CPT

## 2024-09-27 PROCEDURE — 85610 PROTHROMBIN TIME: CPT

## 2024-09-27 PROCEDURE — 85027 COMPLETE CBC AUTOMATED: CPT

## 2024-09-27 PROCEDURE — 86900 BLOOD TYPING SEROLOGIC ABO: CPT

## 2024-09-27 PROCEDURE — 80053 COMPREHEN METABOLIC PANEL: CPT

## 2024-09-27 PROCEDURE — 36415 COLL VENOUS BLD VENIPUNCTURE: CPT

## 2024-09-27 PROCEDURE — 81001 URINALYSIS AUTO W/SCOPE: CPT

## 2024-09-27 RX ORDER — SODIUM CHLORIDE, SODIUM LACTATE, POTASSIUM CHLORIDE, CALCIUM CHLORIDE 600; 310; 30; 20 MG/100ML; MG/100ML; MG/100ML; MG/100ML
INJECTION, SOLUTION INTRAVENOUS CONTINUOUS
OUTPATIENT
Start: 2024-10-07

## 2024-09-27 RX ORDER — METRONIDAZOLE 500 MG/100ML
500 INJECTION, SOLUTION INTRAVENOUS ONCE
OUTPATIENT
Start: 2024-10-07

## 2024-09-27 ASSESSMENT — PAIN DESCRIPTION - ORIENTATION: ORIENTATION: RIGHT

## 2024-09-27 ASSESSMENT — PAIN DESCRIPTION - LOCATION: LOCATION: KNEE

## 2024-09-27 ASSESSMENT — PAIN SCALES - GENERAL: PAINLEVEL_OUTOF10: 9

## 2024-09-27 NOTE — PROGRESS NOTES
[facility]  Preoperative Instructions        Surgery Date: 10/7/24          Time of Arrival: To Be Called  Contact: [phone]      On the day of your surgery, please report to Surgical Services Registration Desk and sign in at your designated time.  The Surgery Center is located to the right of the Emergency Room.     2. You must have someone with you to drive you home. You should not drive a car for 24 hours following surgery. Please make arrangements for a friend or family member to stay with you for the first 24 hours after your surgery.    3. Do not have anything to eat or drink (including water, gum, mints, coffee, juice) after midnight Sunday October 6th, 2024.?This may not apply to medications prescribed by your physician. ?(Please note below the special instructions with medications to take the morning of your procedure.)    4. We recommend you do not drink any alcoholic beverages for 24 hours before and after your surgery.    5. Contact your surgeon's office for instructions on the following medications: non-steroidal anti-inflammatory drugs (i.e. Advil, Aleve), vitamins, and supplements. (Some surgeon's will want you to stop these medications prior to surgery and others may allow you to take them)  **If you are currently taking Plavix, Coumadin, Aspirin and/or other blood-thinning agents, contact your surgeon for instructions.** Your surgeon will partner with the physician prescribing these medications to determine if it is safe to stop or if you need to continue taking.  Please do not stop taking these medications without instructions from your surgeon    6. Wear comfortable clothes.  Wear glasses instead of contacts.  Do not bring any money or jewelry. Please bring picture ID, insurance card, and any prearranged co-payment or hospital payment.  Do not wear make-up, particularly mascara the morning of your surgery.  Do not wear nail polish, particularly if you are having foot

## 2024-09-27 NOTE — PROGRESS NOTES
Incentive Spirometer        Using the incentive spirometer helps expand the small air sacs of your lungs, helps you breathe deeply, and helps improve your lung function.  Use your incentive spirometer twice a day (10 breaths each time) prior to surgery.      How to Use Your Incentive Spirometer:  Hold the incentive spirometer in an upright position.   Breathe out as usual.   Place the mouthpiece in your mouth and seal your lips tightly around it.   Take a deep breath.  Breathe in slowly and as deeply as possible. Keep the blue flow rate guide between the arrows.   Hold your breath as long as possible. Then exhale slowly and allow the piston to fall to the bottom of the column.   Rest for a few seconds and repeat steps one through five at least 10 times.     PAT Tidal Volume: 1500  X: 4  Date: 9/27/24        BRING THE INCENTIVE SPIROMETER WITH YOU TO THE HOSPITAL ON THE DAY OF YOUR SURGERY.  Opportunity given to ask and answer questions as well as to observe return demonstration.    Patient signature_____________________________    Witness____________________________

## 2024-09-28 ENCOUNTER — TELEPHONE (OUTPATIENT)
Age: 54
End: 2024-09-28

## 2024-09-28 LAB
BACTERIA SPEC CULT: NORMAL
BACTERIA SPEC CULT: NORMAL
SERVICE CMNT-IMP: NORMAL

## 2024-09-28 NOTE — TELEPHONE ENCOUNTER
Please call her with her results and let her know I sent her the following message in a letter:    TSH is a thyroid test.  Your level is 4.49 which is normal but still above my goal of 0.45-2.0.  The TSH goes opposite of your thyroid dose and suggests your dose of levothyroxine is not quite enough.  I will increase your dose to 112 mcg daily and have sent a new prescription to your local pharmacy.  -------------------------------------------------------------------------------------------------------------------  BUN and creatinine are markers of kidney function.  Your creatinine has improved from 1.47 to 1.28.  -------------------------------------------------------------------------------------------------------------------  Your sodium has improved from 132 to 135.  -------------------------------------------------------------------------------------------------------------------  Take the enclosed lab slip to repeat your labs at [facility] the week prior to your next visit on 11/8/24 at 12:10pm

## 2024-09-30 NOTE — PERIOP NOTE
Dr. Ricks reviewed [facility], cardiac notes, EKG 6/6/24,3/20/24, METS>4.  Okay to proceed with planned procedure.

## 2024-09-30 NOTE — PERIOP NOTE
Faxed +staph aureus results to Dr. Herron's office with request to treat. Fax confirmation received. Also called and left message with  for Dr. Herron's team regarding +staph. Holly from [facility] called back to confirm that pt has been treated with mupirocin 2% BID x10 days.

## 2024-10-04 RX ORDER — MUPIROCIN 20 MG/G
OINTMENT TOPICAL 2 TIMES DAILY
COMMUNITY

## 2024-10-07 ENCOUNTER — HOSPITAL ENCOUNTER (OUTPATIENT)
Facility: HOSPITAL | Age: 54
Setting detail: OBSERVATION
Discharge: HOME | End: 2024-10-08
Attending: OBSTETRICS & GYNECOLOGY | Admitting: OBSTETRICS & GYNECOLOGY
Payer: MEDICARE

## 2024-10-07 ENCOUNTER — ANESTHESIA EVENT (OUTPATIENT)
Facility: HOSPITAL | Age: 54
End: 2024-10-07
Payer: MEDICARE

## 2024-10-07 ENCOUNTER — ANESTHESIA (OUTPATIENT)
Facility: HOSPITAL | Age: 54
End: 2024-10-07
Payer: MEDICARE

## 2024-10-07 PROBLEM — Z90.710 S/P TOTAL HYSTERECTOMY: Status: ACTIVE | Noted: 2024-10-07

## 2024-10-07 LAB
GLUCOSE BLD STRIP.AUTO-MCNC: 172 MG/DL (ref 65–117)
GLUCOSE BLD STRIP.AUTO-MCNC: 221 MG/DL (ref 65–117)
GLUCOSE BLD STRIP.AUTO-MCNC: 272 MG/DL (ref 65–117)
GLUCOSE BLD STRIP.AUTO-MCNC: 365 MG/DL (ref 65–117)
GLUCOSE BLD STRIP.AUTO-MCNC: 402 MG/DL (ref 65–117)
HCG UR QL: NEGATIVE
SERVICE CMNT-IMP: ABNORMAL

## 2024-10-07 PROCEDURE — 6360000002 HC RX W HCPCS: Performed by: NURSE ANESTHETIST, CERTIFIED REGISTERED

## 2024-10-07 PROCEDURE — 6360000002 HC RX W HCPCS: Performed by: OBSTETRICS & GYNECOLOGY

## 2024-10-07 PROCEDURE — 88307 TISSUE EXAM BY PATHOLOGIST: CPT

## 2024-10-07 PROCEDURE — G0378 HOSPITAL OBSERVATION PER HR: HCPCS

## 2024-10-07 PROCEDURE — 6360000002 HC RX W HCPCS: Performed by: STUDENT IN AN ORGANIZED HEALTH CARE EDUCATION/TRAINING PROGRAM

## 2024-10-07 PROCEDURE — 2500000003 HC RX 250 WO HCPCS: Performed by: ANESTHESIOLOGY

## 2024-10-07 PROCEDURE — 6370000000 HC RX 637 (ALT 250 FOR IP): Performed by: OBSTETRICS & GYNECOLOGY

## 2024-10-07 PROCEDURE — 2580000003 HC RX 258: Performed by: ANESTHESIOLOGY

## 2024-10-07 PROCEDURE — 88304 TISSUE EXAM BY PATHOLOGIST: CPT

## 2024-10-07 PROCEDURE — 81025 URINE PREGNANCY TEST: CPT

## 2024-10-07 PROCEDURE — 7100000000 HC PACU RECOVERY - FIRST 15 MIN: Performed by: OBSTETRICS & GYNECOLOGY

## 2024-10-07 PROCEDURE — 6370000000 HC RX 637 (ALT 250 FOR IP): Performed by: NURSE PRACTITIONER

## 2024-10-07 PROCEDURE — 94760 N-INVAS EAR/PLS OXIMETRY 1: CPT

## 2024-10-07 PROCEDURE — 2580000003 HC RX 258: Performed by: OBSTETRICS & GYNECOLOGY

## 2024-10-07 PROCEDURE — 3600000014 HC SURGERY LEVEL 4 ADDTL 15MIN: Performed by: OBSTETRICS & GYNECOLOGY

## 2024-10-07 PROCEDURE — 6360000002 HC RX W HCPCS: Performed by: ANESTHESIOLOGY

## 2024-10-07 PROCEDURE — 3700000000 HC ANESTHESIA ATTENDED CARE: Performed by: OBSTETRICS & GYNECOLOGY

## 2024-10-07 PROCEDURE — 7100000001 HC PACU RECOVERY - ADDTL 15 MIN: Performed by: OBSTETRICS & GYNECOLOGY

## 2024-10-07 PROCEDURE — 3600000004 HC SURGERY LEVEL 4 BASE: Performed by: OBSTETRICS & GYNECOLOGY

## 2024-10-07 PROCEDURE — 82962 GLUCOSE BLOOD TEST: CPT

## 2024-10-07 PROCEDURE — 2500000003 HC RX 250 WO HCPCS: Performed by: NURSE ANESTHETIST, CERTIFIED REGISTERED

## 2024-10-07 PROCEDURE — 3700000001 HC ADD 15 MINUTES (ANESTHESIA): Performed by: OBSTETRICS & GYNECOLOGY

## 2024-10-07 PROCEDURE — 2709999900 HC NON-CHARGEABLE SUPPLY: Performed by: OBSTETRICS & GYNECOLOGY

## 2024-10-07 PROCEDURE — 94640 AIRWAY INHALATION TREATMENT: CPT

## 2024-10-07 PROCEDURE — 2720000010 HC SURG SUPPLY STERILE: Performed by: OBSTETRICS & GYNECOLOGY

## 2024-10-07 RX ORDER — ONDANSETRON 2 MG/ML
4 INJECTION INTRAMUSCULAR; INTRAVENOUS EVERY 6 HOURS PRN
Status: DISCONTINUED | OUTPATIENT
Start: 2024-10-07 | End: 2024-10-08 | Stop reason: HOSPADM

## 2024-10-07 RX ORDER — M-VIT,TX,IRON,MINS/CALC/FOLIC 27MG-0.4MG
1 TABLET ORAL DAILY
Status: DISCONTINUED | OUTPATIENT
Start: 2024-10-07 | End: 2024-10-08 | Stop reason: HOSPADM

## 2024-10-07 RX ORDER — INSULIN LISPRO 100 [IU]/ML
0-4 INJECTION, SOLUTION INTRAVENOUS; SUBCUTANEOUS NIGHTLY
Status: DISCONTINUED | OUTPATIENT
Start: 2024-10-07 | End: 2024-10-08 | Stop reason: HOSPADM

## 2024-10-07 RX ORDER — FENTANYL CITRATE 50 UG/ML
50 INJECTION, SOLUTION INTRAMUSCULAR; INTRAVENOUS EVERY 5 MIN PRN
Status: DISCONTINUED | OUTPATIENT
Start: 2024-10-07 | End: 2024-10-07 | Stop reason: HOSPADM

## 2024-10-07 RX ORDER — SODIUM CHLORIDE 9 MG/ML
INJECTION, SOLUTION INTRAVENOUS PRN
Status: DISCONTINUED | OUTPATIENT
Start: 2024-10-07 | End: 2024-10-07 | Stop reason: HOSPADM

## 2024-10-07 RX ORDER — SODIUM CHLORIDE, SODIUM LACTATE, POTASSIUM CHLORIDE, CALCIUM CHLORIDE 600; 310; 30; 20 MG/100ML; MG/100ML; MG/100ML; MG/100ML
INJECTION, SOLUTION INTRAVENOUS CONTINUOUS
Status: DISCONTINUED | OUTPATIENT
Start: 2024-10-07 | End: 2024-10-07 | Stop reason: HOSPADM

## 2024-10-07 RX ORDER — MORPHINE SULFATE 4 MG/ML
2 INJECTION, SOLUTION INTRAMUSCULAR; INTRAVENOUS
Status: DISCONTINUED | OUTPATIENT
Start: 2024-10-07 | End: 2024-10-08

## 2024-10-07 RX ORDER — ARIPIPRAZOLE 5 MG/1
30 TABLET ORAL DAILY
Status: DISCONTINUED | OUTPATIENT
Start: 2024-10-07 | End: 2024-10-08 | Stop reason: HOSPADM

## 2024-10-07 RX ORDER — DEXTROSE MONOHYDRATE 100 MG/ML
INJECTION, SOLUTION INTRAVENOUS CONTINUOUS PRN
Status: DISCONTINUED | OUTPATIENT
Start: 2024-10-07 | End: 2024-10-08 | Stop reason: HOSPADM

## 2024-10-07 RX ORDER — FENTANYL CITRATE 50 UG/ML
INJECTION, SOLUTION INTRAMUSCULAR; INTRAVENOUS
Status: DISCONTINUED | OUTPATIENT
Start: 2024-10-07 | End: 2024-10-07 | Stop reason: SDUPTHER

## 2024-10-07 RX ORDER — CITALOPRAM HYDROBROMIDE 20 MG/1
40 TABLET ORAL DAILY
Status: DISCONTINUED | OUTPATIENT
Start: 2024-10-07 | End: 2024-10-08 | Stop reason: HOSPADM

## 2024-10-07 RX ORDER — METRONIDAZOLE 500 MG/100ML
500 INJECTION, SOLUTION INTRAVENOUS ONCE
Status: COMPLETED | OUTPATIENT
Start: 2024-10-07 | End: 2024-10-07

## 2024-10-07 RX ORDER — BUSPIRONE HYDROCHLORIDE 5 MG/1
15 TABLET ORAL 3 TIMES DAILY
Status: DISCONTINUED | OUTPATIENT
Start: 2024-10-07 | End: 2024-10-08 | Stop reason: HOSPADM

## 2024-10-07 RX ORDER — INSULIN LISPRO 100 [IU]/ML
7 INJECTION, SOLUTION INTRAVENOUS; SUBCUTANEOUS
Status: DISCONTINUED | OUTPATIENT
Start: 2024-10-08 | End: 2024-10-08 | Stop reason: HOSPADM

## 2024-10-07 RX ORDER — BUPIVACAINE HYDROCHLORIDE 5 MG/ML
INJECTION, SOLUTION EPIDURAL; INTRACAUDAL PRN
Status: DISCONTINUED | OUTPATIENT
Start: 2024-10-07 | End: 2024-10-07 | Stop reason: ALTCHOICE

## 2024-10-07 RX ORDER — ONDANSETRON 4 MG/1
4 TABLET, ORALLY DISINTEGRATING ORAL EVERY 8 HOURS PRN
Status: DISCONTINUED | OUTPATIENT
Start: 2024-10-07 | End: 2024-10-08 | Stop reason: HOSPADM

## 2024-10-07 RX ORDER — ONDANSETRON 2 MG/ML
4 INJECTION INTRAMUSCULAR; INTRAVENOUS
Status: COMPLETED | OUTPATIENT
Start: 2024-10-07 | End: 2024-10-07

## 2024-10-07 RX ORDER — ROSUVASTATIN CALCIUM 40 MG/1
40 TABLET, COATED ORAL DAILY
Status: DISCONTINUED | OUTPATIENT
Start: 2024-10-07 | End: 2024-10-08 | Stop reason: HOSPADM

## 2024-10-07 RX ORDER — ROCURONIUM BROMIDE 10 MG/ML
INJECTION, SOLUTION INTRAVENOUS
Status: DISCONTINUED | OUTPATIENT
Start: 2024-10-07 | End: 2024-10-07 | Stop reason: SDUPTHER

## 2024-10-07 RX ORDER — SODIUM CHLORIDE 9 MG/ML
INJECTION, SOLUTION INTRAVENOUS CONTINUOUS
Status: DISCONTINUED | OUTPATIENT
Start: 2024-10-07 | End: 2024-10-08 | Stop reason: HOSPADM

## 2024-10-07 RX ORDER — ASPIRIN 81 MG/1
81 TABLET ORAL DAILY
Status: DISCONTINUED | OUTPATIENT
Start: 2024-10-07 | End: 2024-10-08 | Stop reason: HOSPADM

## 2024-10-07 RX ORDER — PROCHLORPERAZINE EDISYLATE 5 MG/ML
5 INJECTION INTRAMUSCULAR; INTRAVENOUS ONCE
Status: COMPLETED | OUTPATIENT
Start: 2024-10-07 | End: 2024-10-07

## 2024-10-07 RX ORDER — MUPIROCIN 20 MG/G
OINTMENT TOPICAL 2 TIMES DAILY
Status: DISCONTINUED | OUTPATIENT
Start: 2024-10-07 | End: 2024-10-08 | Stop reason: HOSPADM

## 2024-10-07 RX ORDER — MORPHINE SULFATE 4 MG/ML
4 INJECTION, SOLUTION INTRAMUSCULAR; INTRAVENOUS
Status: DISCONTINUED | OUTPATIENT
Start: 2024-10-07 | End: 2024-10-08

## 2024-10-07 RX ORDER — PANTOPRAZOLE SODIUM 40 MG/1
40 TABLET, DELAYED RELEASE ORAL
Status: DISCONTINUED | OUTPATIENT
Start: 2024-10-08 | End: 2024-10-08 | Stop reason: HOSPADM

## 2024-10-07 RX ORDER — DEXMEDETOMIDINE HYDROCHLORIDE 100 UG/ML
INJECTION, SOLUTION INTRAVENOUS
Status: DISCONTINUED | OUTPATIENT
Start: 2024-10-07 | End: 2024-10-07 | Stop reason: SDUPTHER

## 2024-10-07 RX ORDER — INSULIN GLARGINE 100 [IU]/ML
30 INJECTION, SOLUTION SUBCUTANEOUS DAILY
Status: DISCONTINUED | OUTPATIENT
Start: 2024-10-08 | End: 2024-10-08 | Stop reason: HOSPADM

## 2024-10-07 RX ORDER — FLUTICASONE PROPIONATE 50 MCG
1 SPRAY, SUSPENSION (ML) NASAL DAILY PRN
Status: DISCONTINUED | OUTPATIENT
Start: 2024-10-07 | End: 2024-10-08 | Stop reason: HOSPADM

## 2024-10-07 RX ORDER — VITAMIN B COMPLEX
5000 TABLET ORAL DAILY
Status: DISCONTINUED | OUTPATIENT
Start: 2024-10-07 | End: 2024-10-08 | Stop reason: HOSPADM

## 2024-10-07 RX ORDER — NALOXONE HYDROCHLORIDE 0.4 MG/ML
INJECTION, SOLUTION INTRAMUSCULAR; INTRAVENOUS; SUBCUTANEOUS PRN
Status: DISCONTINUED | OUTPATIENT
Start: 2024-10-07 | End: 2024-10-07 | Stop reason: HOSPADM

## 2024-10-07 RX ORDER — DULOXETIN HYDROCHLORIDE 20 MG/1
20 CAPSULE, DELAYED RELEASE ORAL DAILY
Status: DISCONTINUED | OUTPATIENT
Start: 2024-10-07 | End: 2024-10-08 | Stop reason: HOSPADM

## 2024-10-07 RX ORDER — ACETAMINOPHEN 500 MG
1000 TABLET ORAL EVERY 8 HOURS SCHEDULED
Status: DISCONTINUED | OUTPATIENT
Start: 2024-10-07 | End: 2024-10-08 | Stop reason: HOSPADM

## 2024-10-07 RX ORDER — SODIUM CHLORIDE 0.9 % (FLUSH) 0.9 %
5-40 SYRINGE (ML) INJECTION PRN
Status: DISCONTINUED | OUTPATIENT
Start: 2024-10-07 | End: 2024-10-08 | Stop reason: HOSPADM

## 2024-10-07 RX ORDER — SODIUM CHLORIDE 9 MG/ML
INJECTION, SOLUTION INTRAVENOUS PRN
Status: DISCONTINUED | OUTPATIENT
Start: 2024-10-07 | End: 2024-10-08 | Stop reason: HOSPADM

## 2024-10-07 RX ORDER — LIDOCAINE HYDROCHLORIDE 20 MG/ML
INJECTION, SOLUTION EPIDURAL; INFILTRATION; INTRACAUDAL; PERINEURAL
Status: DISCONTINUED | OUTPATIENT
Start: 2024-10-07 | End: 2024-10-07 | Stop reason: SDUPTHER

## 2024-10-07 RX ORDER — OXYCODONE HYDROCHLORIDE 5 MG/1
10 TABLET ORAL EVERY 4 HOURS PRN
Status: DISCONTINUED | OUTPATIENT
Start: 2024-10-07 | End: 2024-10-08 | Stop reason: HOSPADM

## 2024-10-07 RX ORDER — PROPOFOL 10 MG/ML
INJECTION, EMULSION INTRAVENOUS
Status: DISCONTINUED | OUTPATIENT
Start: 2024-10-07 | End: 2024-10-07 | Stop reason: SDUPTHER

## 2024-10-07 RX ORDER — SUCCINYLCHOLINE/SOD CL,ISO/PF 200MG/10ML
SYRINGE (ML) INTRAVENOUS
Status: DISCONTINUED | OUTPATIENT
Start: 2024-10-07 | End: 2024-10-07 | Stop reason: SDUPTHER

## 2024-10-07 RX ORDER — LEVOTHYROXINE SODIUM 100 UG/1
100 TABLET ORAL DAILY
Status: DISCONTINUED | OUTPATIENT
Start: 2024-10-07 | End: 2024-10-08 | Stop reason: HOSPADM

## 2024-10-07 RX ORDER — DEXAMETHASONE SODIUM PHOSPHATE 4 MG/ML
INJECTION, SOLUTION INTRA-ARTICULAR; INTRALESIONAL; INTRAMUSCULAR; INTRAVENOUS; SOFT TISSUE
Status: DISCONTINUED | OUTPATIENT
Start: 2024-10-07 | End: 2024-10-07 | Stop reason: SDUPTHER

## 2024-10-07 RX ORDER — MIDAZOLAM HYDROCHLORIDE 1 MG/ML
INJECTION INTRAMUSCULAR; INTRAVENOUS
Status: DISCONTINUED | OUTPATIENT
Start: 2024-10-07 | End: 2024-10-07 | Stop reason: SDUPTHER

## 2024-10-07 RX ORDER — IBUPROFEN 400 MG/1
800 TABLET, FILM COATED ORAL EVERY 8 HOURS
Status: DISCONTINUED | OUTPATIENT
Start: 2024-10-07 | End: 2024-10-08 | Stop reason: HOSPADM

## 2024-10-07 RX ORDER — BUMETANIDE 1 MG/1
2 TABLET ORAL 2 TIMES DAILY
Status: DISCONTINUED | OUTPATIENT
Start: 2024-10-07 | End: 2024-10-08 | Stop reason: HOSPADM

## 2024-10-07 RX ORDER — HYDROMORPHONE HYDROCHLORIDE 1 MG/ML
0.5 INJECTION, SOLUTION INTRAMUSCULAR; INTRAVENOUS; SUBCUTANEOUS EVERY 5 MIN PRN
Status: DISCONTINUED | OUTPATIENT
Start: 2024-10-07 | End: 2024-10-07 | Stop reason: HOSPADM

## 2024-10-07 RX ORDER — ALPRAZOLAM 0.5 MG
2 TABLET ORAL 4 TIMES DAILY PRN
Status: DISCONTINUED | OUTPATIENT
Start: 2024-10-07 | End: 2024-10-08 | Stop reason: HOSPADM

## 2024-10-07 RX ORDER — INSULIN LISPRO 100 [IU]/ML
5 INJECTION, SOLUTION INTRAVENOUS; SUBCUTANEOUS ONCE
Status: COMPLETED | OUTPATIENT
Start: 2024-10-07 | End: 2024-10-07

## 2024-10-07 RX ORDER — GLUCAGON 1 MG/ML
1 KIT INJECTION PRN
Status: DISCONTINUED | OUTPATIENT
Start: 2024-10-07 | End: 2024-10-08 | Stop reason: HOSPADM

## 2024-10-07 RX ORDER — INSULIN LISPRO 100 [IU]/ML
0-16 INJECTION, SOLUTION INTRAVENOUS; SUBCUTANEOUS
Status: DISCONTINUED | OUTPATIENT
Start: 2024-10-08 | End: 2024-10-08 | Stop reason: HOSPADM

## 2024-10-07 RX ORDER — SODIUM CHLORIDE 0.9 % (FLUSH) 0.9 %
5-40 SYRINGE (ML) INJECTION EVERY 12 HOURS SCHEDULED
Status: DISCONTINUED | OUTPATIENT
Start: 2024-10-07 | End: 2024-10-08 | Stop reason: HOSPADM

## 2024-10-07 RX ORDER — BUTALBITAL, ACETAMINOPHEN AND CAFFEINE 50; 325; 40 MG/1; MG/1; MG/1
1 TABLET ORAL EVERY 6 HOURS PRN
Status: DISCONTINUED | OUTPATIENT
Start: 2024-10-07 | End: 2024-10-08 | Stop reason: HOSPADM

## 2024-10-07 RX ORDER — SODIUM CHLORIDE 0.9 % (FLUSH) 0.9 %
5-40 SYRINGE (ML) INJECTION EVERY 12 HOURS SCHEDULED
Status: DISCONTINUED | OUTPATIENT
Start: 2024-10-07 | End: 2024-10-07 | Stop reason: HOSPADM

## 2024-10-07 RX ORDER — SODIUM CHLORIDE 0.9 % (FLUSH) 0.9 %
5-40 SYRINGE (ML) INJECTION PRN
Status: DISCONTINUED | OUTPATIENT
Start: 2024-10-07 | End: 2024-10-07 | Stop reason: HOSPADM

## 2024-10-07 RX ORDER — ONDANSETRON 2 MG/ML
INJECTION INTRAMUSCULAR; INTRAVENOUS
Status: DISCONTINUED | OUTPATIENT
Start: 2024-10-07 | End: 2024-10-07 | Stop reason: SDUPTHER

## 2024-10-07 RX ORDER — PROCHLORPERAZINE EDISYLATE 5 MG/ML
5 INJECTION INTRAMUSCULAR; INTRAVENOUS
Status: COMPLETED | OUTPATIENT
Start: 2024-10-07 | End: 2024-10-07

## 2024-10-07 RX ORDER — MEPERIDINE HYDROCHLORIDE 25 MG/ML
12.5 INJECTION INTRAMUSCULAR; INTRAVENOUS; SUBCUTANEOUS EVERY 5 MIN PRN
Status: DISCONTINUED | OUTPATIENT
Start: 2024-10-07 | End: 2024-10-07 | Stop reason: HOSPADM

## 2024-10-07 RX ADMIN — SODIUM CHLORIDE, POTASSIUM CHLORIDE, SODIUM LACTATE AND CALCIUM CHLORIDE: 600; 310; 30; 20 INJECTION, SOLUTION INTRAVENOUS at 07:12

## 2024-10-07 RX ADMIN — DEXMEDETOMIDINE 4 MCG: 100 INJECTION, SOLUTION INTRAVENOUS at 08:03

## 2024-10-07 RX ADMIN — PROCHLORPERAZINE EDISYLATE 5 MG: 5 INJECTION INTRAMUSCULAR; INTRAVENOUS at 14:29

## 2024-10-07 RX ADMIN — BUMETANIDE 2 MG: 1 TABLET ORAL at 22:02

## 2024-10-07 RX ADMIN — METRONIDAZOLE 500 MG: 500 INJECTION, SOLUTION INTRAVENOUS at 07:34

## 2024-10-07 RX ADMIN — PROCHLORPERAZINE EDISYLATE 5 MG: 5 INJECTION INTRAMUSCULAR; INTRAVENOUS at 10:13

## 2024-10-07 RX ADMIN — Medication 100 MG: at 07:41

## 2024-10-07 RX ADMIN — MORPHINE SULFATE 4 MG: 4 INJECTION, SOLUTION INTRAMUSCULAR; INTRAVENOUS at 16:33

## 2024-10-07 RX ADMIN — ROCURONIUM BROMIDE 20 MG: 10 INJECTION INTRAVENOUS at 07:59

## 2024-10-07 RX ADMIN — HYDROMORPHONE HYDROCHLORIDE 0.5 MG: 1 INJECTION, SOLUTION INTRAMUSCULAR; INTRAVENOUS; SUBCUTANEOUS at 14:52

## 2024-10-07 RX ADMIN — MORPHINE SULFATE 4 MG: 4 INJECTION, SOLUTION INTRAMUSCULAR; INTRAVENOUS at 18:25

## 2024-10-07 RX ADMIN — HYDROMORPHONE HYDROCHLORIDE 0.5 MG: 1 INJECTION, SOLUTION INTRAMUSCULAR; INTRAVENOUS; SUBCUTANEOUS at 08:06

## 2024-10-07 RX ADMIN — DEXMEDETOMIDINE 2 MCG: 100 INJECTION, SOLUTION INTRAVENOUS at 08:40

## 2024-10-07 RX ADMIN — MIDAZOLAM 2 MG: 1 INJECTION INTRAMUSCULAR; INTRAVENOUS at 07:35

## 2024-10-07 RX ADMIN — BUTALBITAL, ACETAMINOPHEN AND CAFFEINE 1 TABLET: 325; 50; 40 TABLET ORAL at 12:56

## 2024-10-07 RX ADMIN — ONDANSETRON 4 MG: 2 INJECTION INTRAMUSCULAR; INTRAVENOUS at 16:28

## 2024-10-07 RX ADMIN — FENTANYL CITRATE 50 MCG: 50 INJECTION INTRAMUSCULAR; INTRAVENOUS at 10:12

## 2024-10-07 RX ADMIN — BUSPIRONE HYDROCHLORIDE 15 MG: 5 TABLET ORAL at 22:02

## 2024-10-07 RX ADMIN — INSULIN LISPRO 5 UNITS: 100 INJECTION, SOLUTION INTRAVENOUS; SUBCUTANEOUS at 22:02

## 2024-10-07 RX ADMIN — ACETAMINOPHEN 1000 MG: 500 TABLET ORAL at 22:03

## 2024-10-07 RX ADMIN — DEXAMETHASONE SODIUM PHOSPHATE 8 MG: 4 INJECTION, SOLUTION INTRAMUSCULAR; INTRAVENOUS at 07:47

## 2024-10-07 RX ADMIN — ONDANSETRON HYDROCHLORIDE 4 MG: 2 INJECTION, SOLUTION INTRAMUSCULAR; INTRAVENOUS at 09:01

## 2024-10-07 RX ADMIN — MIDAZOLAM 1 MG: 1 INJECTION INTRAMUSCULAR; INTRAVENOUS at 07:37

## 2024-10-07 RX ADMIN — FENTANYL CITRATE 50 MCG: 50 INJECTION INTRAMUSCULAR; INTRAVENOUS at 09:53

## 2024-10-07 RX ADMIN — ONDANSETRON 4 MG: 2 INJECTION INTRAMUSCULAR; INTRAVENOUS at 09:51

## 2024-10-07 RX ADMIN — PROPOFOL 20 MG: 10 INJECTION, EMULSION INTRAVENOUS at 09:11

## 2024-10-07 RX ADMIN — IBUPROFEN 800 MG: 400 TABLET, FILM COATED ORAL at 16:01

## 2024-10-07 RX ADMIN — Medication 20 MG: at 08:06

## 2024-10-07 RX ADMIN — FENTANYL CITRATE 50 MCG: 50 INJECTION, SOLUTION INTRAMUSCULAR; INTRAVENOUS at 07:48

## 2024-10-07 RX ADMIN — SODIUM CHLORIDE: 9 INJECTION, SOLUTION INTRAVENOUS at 18:21

## 2024-10-07 RX ADMIN — SUGAMMADEX 200 MG: 100 INJECTION, SOLUTION INTRAVENOUS at 09:18

## 2024-10-07 RX ADMIN — ROCURONIUM BROMIDE 10 MG: 10 INJECTION INTRAVENOUS at 08:15

## 2024-10-07 RX ADMIN — HYDROMORPHONE HYDROCHLORIDE 0.5 MG: 1 INJECTION, SOLUTION INTRAMUSCULAR; INTRAVENOUS; SUBCUTANEOUS at 08:32

## 2024-10-07 RX ADMIN — WATER 2000 MG: 1 INJECTION INTRAMUSCULAR; INTRAVENOUS; SUBCUTANEOUS at 07:43

## 2024-10-07 RX ADMIN — SODIUM CHLORIDE, PRESERVATIVE FREE 10 ML: 5 INJECTION INTRAVENOUS at 22:04

## 2024-10-07 RX ADMIN — PROPOFOL 170 MG: 10 INJECTION, EMULSION INTRAVENOUS at 07:40

## 2024-10-07 RX ADMIN — MUPIROCIN: 20 OINTMENT TOPICAL at 22:04

## 2024-10-07 RX ADMIN — MORPHINE SULFATE 4 MG: 4 INJECTION, SOLUTION INTRAMUSCULAR; INTRAVENOUS at 22:17

## 2024-10-07 RX ADMIN — ALPRAZOLAM 2 MG: 0.5 TABLET ORAL at 23:24

## 2024-10-07 RX ADMIN — ROCURONIUM BROMIDE 10 MG: 10 INJECTION INTRAVENOUS at 08:40

## 2024-10-07 RX ADMIN — MIDAZOLAM 2 MG: 1 INJECTION INTRAMUSCULAR; INTRAVENOUS at 07:32

## 2024-10-07 RX ADMIN — ROCURONIUM BROMIDE 30 MG: 10 INJECTION INTRAVENOUS at 07:48

## 2024-10-07 RX ADMIN — FENTANYL CITRATE 50 MCG: 50 INJECTION, SOLUTION INTRAMUSCULAR; INTRAVENOUS at 07:40

## 2024-10-07 RX ADMIN — LIDOCAINE HYDROCHLORIDE 100 MG: 20 INJECTION, SOLUTION EPIDURAL; INFILTRATION; INTRACAUDAL; PERINEURAL at 07:39

## 2024-10-07 RX ADMIN — ALPRAZOLAM 2 MG: 0.5 TABLET ORAL at 16:01

## 2024-10-07 RX ADMIN — Medication 3 AMPULE: at 07:12

## 2024-10-07 ASSESSMENT — PAIN DESCRIPTION - DESCRIPTORS
DESCRIPTORS: CRAMPING
DESCRIPTORS: CRAMPING
DESCRIPTORS: ACHING
DESCRIPTORS: ACHING
DESCRIPTORS: CRAMPING
DESCRIPTORS: ACHING
DESCRIPTORS: CRAMPING

## 2024-10-07 ASSESSMENT — PAIN DESCRIPTION - ORIENTATION
ORIENTATION: ANTERIOR
ORIENTATION: ANTERIOR

## 2024-10-07 ASSESSMENT — PAIN DESCRIPTION - LOCATION
LOCATION: ABDOMEN

## 2024-10-07 ASSESSMENT — PAIN SCALES - GENERAL
PAINLEVEL_OUTOF10: 7
PAINLEVEL_OUTOF10: 9
PAINLEVEL_OUTOF10: 8
PAINLEVEL_OUTOF10: 6
PAINLEVEL_OUTOF10: 7
PAINLEVEL_OUTOF10: 9
PAINLEVEL_OUTOF10: 8
PAINLEVEL_OUTOF10: 9
PAINLEVEL_OUTOF10: 7

## 2024-10-07 ASSESSMENT — LIFESTYLE VARIABLES: SMOKING_STATUS: 1

## 2024-10-07 ASSESSMENT — COPD QUESTIONNAIRES: CAT_SEVERITY: MODERATE

## 2024-10-07 ASSESSMENT — PAIN - FUNCTIONAL ASSESSMENT: PAIN_FUNCTIONAL_ASSESSMENT: PREVENTS OR INTERFERES SOME ACTIVE ACTIVITIES AND ADLS

## 2024-10-07 NOTE — ANESTHESIA PRE PROCEDURE
Department of Anesthesiology  Preprocedure Note       Name:  Anette Cannon   Age:  54 y.o.  :  1970                                          MRN:  [id number]         Date:  10/7/2024      Surgeon: Surgeon(s):  Matt Herron MD    Procedure: Procedure(s):  TOTAL LAPAROSCOPIC HYSTERECTOMY WITH BILATERAL SALPINGECTOMY OOPHORECTOMY    Medications prior to admission:   Prior to Admission medications    Medication Sig Start Date End Date Taking? Authorizing Provider   mupirocin (BACTROBAN) 2 % ointment Apply topically 2 times daily Apply topically 2 times daily for tx of MSSA    ProviderPetr MD   glucagon, rDNA, (GLUCAGEN HYPOKIT) 1 MG SOLR injection USE AS DIRECTED IN KIT INSTRUCTION 24   Steven Schmitt MD   levothyroxine (SYNTHROID) 100 MCG tablet Take 1 tablet by mouth daily At bedtime for low thyroid--2 hours after dinner and 4 hours apart from any multivitamins 8/3/24   Steven Schmitt MD   SYNAREL 2 MG/ML SOLN 1 spray in right nostril am and 1 spray in left nostril pm 24   Petr Duenas MD   ARIPiprazole (ABILIFY) 30 MG tablet Take 1 tablet by mouth daily 24   Petr Duenas MD   DULoxetine (CYMBALTA) 20 MG extended release capsule Take 1 capsule by mouth daily    Petr Duenas MD   Continuous Glucose  (FREESTYLE MICHELE 3 READER) PATRICIA Use as directed with michele 3 sensors.  DX E10.65 24   Steven Schmitt MD   insulin glargine (LANTUS SOLOSTAR) 100 UNIT/ML injection pen Inject 28 units daily and increase as directed to max of 50 units/day--Dose change 24--updated med list--did not send prescription to the pharmacy  Patient taking differently: Inject 30 Units into the skin daily Inject 30 units daily and increase as directed to max of 50 units/day--Dose change 24--updated med list--did not send prescription to the pharmacy 24   Steven Schmitt MD   bumetanide (BUMEX) 2 MG tablet Take 1 tablet by mouth 2 times daily

## 2024-10-07 NOTE — OP NOTE
Operative Note      Patient: Anette Cannon  YOB: 1970  MRN: [id number]    Date of Procedure: 10/7/2024    Pre-Op Diagnosis Codes:      * Endometriosis, uterus [N80.00]    Post-Op Diagnosis: Same, free-floating intraperitoneal body       Procedure(s):  TOTAL LAPAROSCOPIC HYSTERECTOMY WITH BILATERAL SALPINGECTOMY OOPHORECTOMY    Surgeon(s):  Matt Herron MD    Assistant:   Surgical Assistant: Bre Scanlon    Anesthesia: General    Estimated Blood Loss (mL): 10cc    Complications: None    Specimens:   ID Type Source Tests Collected by Time Destination   1 : Uterus Tissue Uterus SURGICAL PATHOLOGY Matt Herron MD 10/7/2024 0827    2 :  Tissue Ovary SURGICAL PATHOLOGY Matt Herron MD 10/7/2024 0828      Antibiotic Prophylaxis: Ancef, flagyl    Implants:  * No implants in log *      Drains:   Urinary Catheter 10/07/24 Oates (Active)       Findings:  Infection Present At Time Of Surgery (PATOS) (choose all levels that have infection present):  No infection present  Other Findings: The uterus is small, with a few subcentimeter fibroids.  The bilateral ovaries appear somewhat atrophied . The tubes are normal.  There is scarring from prior endometriosis resection and ablation in te pelvis but no marcella active disease is noted.  There is a 1.5 cm egg shaped white, somewhat calcified mass floating freely in the pelvis.      Detailed Description of Procedure:         After understanding the risks, benefits, and alternatives to the proposed procedure, the patient was taken to the operating room, where she was administered general anesthesia in the supine position. She was then placed in the dorsal lithotomy position and prepped and draped in usual sterile fashion.  A oates catheter is placed.  A sidearm speculum is introduced into the vagina.  There cervix is grasped with a single tooth tenaculum.  A [employer] uterine manipulator is placed and the balloon inflated with air.  Gloves are changed and

## 2024-10-07 NOTE — ANESTHESIA POSTPROCEDURE EVALUATION
Department of Anesthesiology  Postprocedure Note    Patient: Anette Cannon  MRN: [id number]  YOB: 1970  Date of evaluation: 10/7/2024    Procedure Summary       Date: 10/07/24 Room / Location: [facility] MA[facility] OR M2 / [facility] MA[facility] OR    Anesthesia Start: 0732 Anesthesia Stop: 0932    Procedure: TOTAL LAPAROSCOPIC HYSTERECTOMY WITH BILATERAL SALPINGECTOMY OOPHORECTOMY (Abdomen/Perineum) Diagnosis:       Endometriosis, uterus      (Endometriosis, uterus [N80.00])    Providers: Matt Herron MD Responsible Provider: Paresh Fuentes MD    Anesthesia Type: General ASA Status: 3            Anesthesia Type: General    Tung Phase I: Tung Score: 9    Tung Phase II:      Anesthesia Post Evaluation    Patient location during evaluation: PACU  Patient participation: complete - patient participated  Level of consciousness: sleepy but conscious and responsive to verbal stimuli  Airway patency: patent  Nausea & Vomiting: no vomiting and no nausea  Cardiovascular status: blood pressure returned to baseline and hemodynamically stable  Respiratory status: acceptable  Hydration status: stable    No notable events documented.

## 2024-10-07 NOTE — FLOWSHEET NOTE
10/07/24 0928   Handoff   Communication Given Periop Handoff/Relief   Handoff phase Phase I receiving   Handoff Given To Isha DELVALLE   Handoff Received From Yves DELVALLE & Eula Lima CRNA   Handoff Communication Face to Face     1035 Dr. Bennett notified of Hospitalist  consult.    1030 [facility] (friend) received post op update, room assignment pending.      1508    TRANSFER - OUT REPORT:    Verbal report given to Sun DELVALLE  on Anette Cannon  being transferred to  3126(unit) for routine post-op       Report consisted of patient’s Situation, Background, Assessment and   Recommendations(SBAR).     Information from the following report(s) Surgery Report, Intake/Output, MAR, and Cardiac Rhythm SR  was reviewed with the receiving nurse.    Opportunity for questions and clarification was provided.      Patient transported with:   O2 @ 4 liters  Registered Nurse    Lines:      This SmartLink retrieves the last documented value for LDA assessment data, retrieved by either LDA type or by LDA group ID.     This SmartLink should be used in a SmartText or SmartPhrase. If one is not available, please contact your .

## 2024-10-07 NOTE — H&P
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 - Onset: 05/10/2022 - Post-acute COVID-19; Problem description: Post-acute COVID-19conceptId: [id number]: [facility] Addressed: [id number] ID: 0001Chronic: Yes  Pain of right knee joint - Onset: 10/31/2023  Type 1 diabetes mellitus without complication - Onset: 01/12/2024  Chronic kidney disease stage 3A - Onset: 01/12/2024  Sprain of right knee - Onset: 02/14/2024  Contusion of right knee - Onset: 07/10/2024  Chronic kidney disease due to type 2 diabetes mellitus - Onset: 08/20/2024    total laparoscopic hysterectomy with bilateral salpingo-oophorectomy / xenia / NITHYA OR on 10/7/24 - [facility] to call for [facility]    Electronically Signed by: LG HERRON MD      _____________________________________________  Ordered/Documented by:  Visit Date: 09/04/2024    The History and Physical is reviewed today.  The patient is seen and examined and no changes are required.  Lg Herron MD  10/7/2024  7:21 AM

## 2024-10-08 VITALS
WEIGHT: 166.01 LBS | HEART RATE: 80 BPM | HEIGHT: 64 IN | DIASTOLIC BLOOD PRESSURE: 60 MMHG | RESPIRATION RATE: 18 BRPM | SYSTOLIC BLOOD PRESSURE: 107 MMHG | BODY MASS INDEX: 28.34 KG/M2 | OXYGEN SATURATION: 93 % | TEMPERATURE: 98.1 F

## 2024-10-08 LAB
ERYTHROCYTE [DISTWIDTH] IN BLOOD BY AUTOMATED COUNT: 17.5 % (ref 11.5–14.5)
EST. AVERAGE GLUCOSE BLD GHB EST-MCNC: 200 MG/DL
GLUCOSE BLD STRIP.AUTO-MCNC: 237 MG/DL (ref 65–117)
GLUCOSE BLD STRIP.AUTO-MCNC: 246 MG/DL (ref 65–117)
GLUCOSE BLD STRIP.AUTO-MCNC: 277 MG/DL (ref 65–117)
HBA1C MFR BLD: 8.6 % (ref 4–5.6)
HCT VFR BLD AUTO: 32.6 % (ref 35–47)
HGB BLD-MCNC: 10.1 G/DL (ref 11.5–16)
MCH RBC QN AUTO: 24 PG (ref 26–34)
MCHC RBC AUTO-ENTMCNC: 31 G/DL (ref 30–36.5)
MCV RBC AUTO: 77.6 FL (ref 80–99)
NRBC # BLD: 0 K/UL (ref 0–0.01)
NRBC BLD-RTO: 0 PER 100 WBC
PLATELET # BLD AUTO: 247 K/UL (ref 150–400)
PMV BLD AUTO: 10.2 FL (ref 8.9–12.9)
RBC # BLD AUTO: 4.2 M/UL (ref 3.8–5.2)
SERVICE CMNT-IMP: ABNORMAL
WBC # BLD AUTO: 11.2 K/UL (ref 3.6–11)

## 2024-10-08 PROCEDURE — 94761 N-INVAS EAR/PLS OXIMETRY MLT: CPT

## 2024-10-08 PROCEDURE — 96376 TX/PRO/DX INJ SAME DRUG ADON: CPT

## 2024-10-08 PROCEDURE — 2700000000 HC OXYGEN THERAPY PER DAY

## 2024-10-08 PROCEDURE — 6360000002 HC RX W HCPCS: Performed by: OBSTETRICS & GYNECOLOGY

## 2024-10-08 PROCEDURE — 6370000000 HC RX 637 (ALT 250 FOR IP): Performed by: OBSTETRICS & GYNECOLOGY

## 2024-10-08 PROCEDURE — 82962 GLUCOSE BLOOD TEST: CPT

## 2024-10-08 PROCEDURE — 36415 COLL VENOUS BLD VENIPUNCTURE: CPT

## 2024-10-08 PROCEDURE — G0378 HOSPITAL OBSERVATION PER HR: HCPCS

## 2024-10-08 PROCEDURE — 6370000000 HC RX 637 (ALT 250 FOR IP): Performed by: NURSE PRACTITIONER

## 2024-10-08 PROCEDURE — 96374 THER/PROPH/DIAG INJ IV PUSH: CPT

## 2024-10-08 PROCEDURE — 2580000003 HC RX 258: Performed by: OBSTETRICS & GYNECOLOGY

## 2024-10-08 PROCEDURE — 94640 AIRWAY INHALATION TREATMENT: CPT

## 2024-10-08 PROCEDURE — 6370000000 HC RX 637 (ALT 250 FOR IP): Performed by: INTERNAL MEDICINE

## 2024-10-08 PROCEDURE — 83036 HEMOGLOBIN GLYCOSYLATED A1C: CPT

## 2024-10-08 PROCEDURE — 85027 COMPLETE CBC AUTOMATED: CPT

## 2024-10-08 RX ORDER — MORPHINE SULFATE 100 MG/1
100 TABLET ORAL EVERY 12 HOURS SCHEDULED
Status: DISCONTINUED | OUTPATIENT
Start: 2024-10-08 | End: 2024-10-08 | Stop reason: HOSPADM

## 2024-10-08 RX ORDER — INSULIN GLARGINE 100 [IU]/ML
30 INJECTION, SOLUTION SUBCUTANEOUS NIGHTLY
Qty: 45 ML | Refills: 3 | Status: SHIPPED | OUTPATIENT
Start: 2024-10-08

## 2024-10-08 RX ADMIN — Medication 5000 UNITS: at 08:24

## 2024-10-08 RX ADMIN — MUPIROCIN: 20 OINTMENT TOPICAL at 08:35

## 2024-10-08 RX ADMIN — BUSPIRONE HYDROCHLORIDE 15 MG: 5 TABLET ORAL at 08:25

## 2024-10-08 RX ADMIN — ALPRAZOLAM 2 MG: 0.5 TABLET ORAL at 11:27

## 2024-10-08 RX ADMIN — IBUPROFEN 800 MG: 400 TABLET, FILM COATED ORAL at 01:22

## 2024-10-08 RX ADMIN — INSULIN GLARGINE 30 UNITS: 100 INJECTION, SOLUTION SUBCUTANEOUS at 09:23

## 2024-10-08 RX ADMIN — SODIUM CHLORIDE: 9 INJECTION, SOLUTION INTRAVENOUS at 03:27

## 2024-10-08 RX ADMIN — ROSUVASTATIN CALCIUM 40 MG: 40 TABLET, FILM COATED ORAL at 08:24

## 2024-10-08 RX ADMIN — BUSPIRONE HYDROCHLORIDE 15 MG: 5 TABLET ORAL at 13:20

## 2024-10-08 RX ADMIN — MORPHINE SULFATE 4 MG: 4 INJECTION, SOLUTION INTRAMUSCULAR; INTRAVENOUS at 01:23

## 2024-10-08 RX ADMIN — OXYCODONE 10 MG: 5 TABLET ORAL at 08:23

## 2024-10-08 RX ADMIN — OXYCODONE 10 MG: 5 TABLET ORAL at 12:36

## 2024-10-08 RX ADMIN — ARIPIPRAZOLE 30 MG: 5 TABLET ORAL at 08:25

## 2024-10-08 RX ADMIN — IPRATROPIUM BROMIDE 0.5 MG: 0.5 SOLUTION RESPIRATORY (INHALATION) at 07:47

## 2024-10-08 RX ADMIN — MORPHINE SULFATE 4 MG: 4 INJECTION, SOLUTION INTRAMUSCULAR; INTRAVENOUS at 06:43

## 2024-10-08 RX ADMIN — CITALOPRAM HYDROBROMIDE 40 MG: 20 TABLET ORAL at 08:25

## 2024-10-08 RX ADMIN — Medication 1 TABLET: at 08:25

## 2024-10-08 RX ADMIN — INSULIN LISPRO 4 UNITS: 100 INJECTION, SOLUTION INTRAVENOUS; SUBCUTANEOUS at 10:13

## 2024-10-08 RX ADMIN — INSULIN LISPRO 8 UNITS: 100 INJECTION, SOLUTION INTRAVENOUS; SUBCUTANEOUS at 12:38

## 2024-10-08 RX ADMIN — BUMETANIDE 2 MG: 1 TABLET ORAL at 08:25

## 2024-10-08 RX ADMIN — BREXPIPRAZOLE 2 MG: 1 TABLET ORAL at 08:26

## 2024-10-08 RX ADMIN — INSULIN LISPRO 7 UNITS: 100 INJECTION, SOLUTION INTRAVENOUS; SUBCUTANEOUS at 12:39

## 2024-10-08 RX ADMIN — LEVOTHYROXINE SODIUM 100 MCG: 0.1 TABLET ORAL at 08:23

## 2024-10-08 RX ADMIN — INSULIN LISPRO 7 UNITS: 100 INJECTION, SOLUTION INTRAVENOUS; SUBCUTANEOUS at 09:22

## 2024-10-08 RX ADMIN — MORPHINE SULFATE 4 MG: 4 INJECTION, SOLUTION INTRAMUSCULAR; INTRAVENOUS at 04:14

## 2024-10-08 RX ADMIN — ACETAMINOPHEN 1000 MG: 500 TABLET ORAL at 13:20

## 2024-10-08 RX ADMIN — PANTOPRAZOLE SODIUM 40 MG: 40 TABLET, DELAYED RELEASE ORAL at 06:00

## 2024-10-08 RX ADMIN — DULOXETINE HYDROCHLORIDE 20 MG: 20 CAPSULE, DELAYED RELEASE ORAL at 08:24

## 2024-10-08 RX ADMIN — ACETAMINOPHEN 1000 MG: 500 TABLET ORAL at 06:00

## 2024-10-08 RX ADMIN — ASPIRIN 81 MG: 81 TABLET, COATED ORAL at 08:24

## 2024-10-08 RX ADMIN — IBUPROFEN 800 MG: 400 TABLET, FILM COATED ORAL at 09:23

## 2024-10-08 RX ADMIN — OXYCODONE 10 MG: 5 TABLET ORAL at 17:48

## 2024-10-08 ASSESSMENT — PAIN - FUNCTIONAL ASSESSMENT
PAIN_FUNCTIONAL_ASSESSMENT: ACTIVITIES ARE NOT PREVENTED

## 2024-10-08 ASSESSMENT — PAIN DESCRIPTION - DESCRIPTORS
DESCRIPTORS: ACHING

## 2024-10-08 ASSESSMENT — PAIN DESCRIPTION - ORIENTATION
ORIENTATION: ANTERIOR
ORIENTATION: ANTERIOR
ORIENTATION: LOWER
ORIENTATION: ANTERIOR

## 2024-10-08 ASSESSMENT — PAIN SCALES - GENERAL
PAINLEVEL_OUTOF10: 9
PAINLEVEL_OUTOF10: 6
PAINLEVEL_OUTOF10: 7
PAINLEVEL_OUTOF10: 9
PAINLEVEL_OUTOF10: 5
PAINLEVEL_OUTOF10: 9
PAINLEVEL_OUTOF10: 8

## 2024-10-08 ASSESSMENT — PAIN DESCRIPTION - LOCATION
LOCATION: ABDOMEN

## 2024-10-08 NOTE — TELEPHONE ENCOUNTER
Patient notified of message per Dr. Schmtit and voiced understanding of what was read to them.   Pt stated she will  new dose this week. She also stated she is being discharged today, she had a total hysterectomy.

## 2024-10-08 NOTE — CONSULTS
age 22    Endometriosis     s/p ex-lap 4x    Gastric ulcer     GERD (gastroesophageal reflux disease)     Herpes     Hypothyroidism     Mild pulmonary hypertension (HCC)     Other and unspecified hyperlipidemia     Panic attacks     PFO (patent foramen ovale)     s/p repair    Psychiatric disorder     anxiety, panic attacks    PTSD (post-traumatic stress disorder)     Unspecified essential hypertension     Venous insufficiency       Past Surgical History:   Procedure Laterality Date    ACHILLES TENDON SURGERY Right     and talus surgery    CARDIAC PROCEDURE N/A 08/08/2023    Right heart cath performed by Oz Bennett MD at [facility] CARDIAC CATH LAB    CARDIAC PROCEDURE N/A 03/22/2024    Right heart cath performed by Oz Bennett MD at [facility] CARDIAC CATH LAB    COLONOSCOPY      DILATION AND CURETTAGE OF UTERUS      DILATION AND CURETTAGE OF UTERUS      FINGER TRIGGER RELEASE Right     3rd finger    INTRAUTERINE DEVICE INSERTION      mirena in place    INVASIVE VASCULAR N/A 08/08/2023    Ultrasound guided vascular access performed by Oz Bennett MD at [facility] CARDIAC CATH LAB    INVASIVE VASCULAR N/A 03/22/2024    Ultrasound guided vascular access performed by Oz Bennett MD at [facility] CARDIAC CATH LAB    LAPAROSCOPY      x5 for endometriosis    ORTHOPEDIC SURGERY      injections x2 to right shoulder    AL UNLISTED PROCEDURE CARDIAC SURGERY      atrial septal defect repair    VENOUS ABLATION      GSV and R anterior accessory vein     Social History     Tobacco Use    Smoking status: Every Day     Current packs/day: 1.00     Average packs/day: 1 pack/day for 35.8 years (35.8 ttl pk-yrs)     Types: Cigarettes     Start date: 1989     Passive exposure: Past    Smokeless tobacco: Current   Substance Use Topics    Alcohol use: Not Currently      Family History   Problem Relation Age of Onset    Diabetes Father         R foot amupation    Diabetes Mother         diet controlled    Stroke Neg Hx     Liver 
at bedtime   Yes Petr Duenas MD   citalopram (CELEXA) 40 MG tablet Take 1 tablet by mouth daily   Yes Petr Duenas MD   Cholecalciferol (VITAMIN D3) 125 MCG (5000 UT) TABS Take 1 tablet by mouth daily   Yes Petr Duenas MD   brexpiprazole (REXULTI) 1 MG TABS tablet Take 2 tablets by mouth daily   Yes Petr Duenas MD   Multiple Vitamin (MULTIVITAMIN) TABS Take 1 tablet by mouth daily   Yes Petr Duenas MD   Blood Glucose Monitoring Suppl (TRUE METRIX METER) PATRICIA Test 10 times daily Dx Code: E10.65 9/26/23  Yes Steven Schmitt MD   RELION GLUCOSE TEST STRIPS strip TEST 10 TIMES DAILY DUE TO FLUCTUATING BLOOD SUGARS 9/25/23  Yes Steven Schmitt MD   oxyCODONE (ROXICODONE) 5 MG immediate release tablet Take 1 tablet by mouth every 4 hours as needed. 8/17/23  Yes Petr Duenas MD   ALPRAZolam (XANAX) 2 MG tablet Take 1 tablet by mouth. 4 times a daily   Yes Petr Duenas MD   aspirin 81 MG EC tablet Take 1 tablet by mouth daily   Yes Automatic Reconciliation, Ar   busPIRone (BUSPAR) 15 MG tablet Take 15 mg by mouth 3 times daily   Yes Automatic Reconciliation, Ar   diclofenac sodium (VOLTAREN) 1 % GEL Apply 2 g topically 4 times daily as needed   Yes Automatic Reconciliation, Ar   rosuvastatin (CRESTOR) 40 MG tablet Take 1 tablet by mouth daily   Yes Automatic Reconciliation, Ar   glucagon, rDNA, (GLUCAGEN HYPOKIT) 1 MG SOLR injection USE AS DIRECTED IN KIT INSTRUCTION 9/23/24   Steven Schmitt MD   levonorgestrel (MIRENA, 52 MG,) IUD 52 mg 1 each by IntraUTERine route once    Petr Duenas MD   Morphine Sulfate  MG T12A Take 100 mg by mouth in the morning and at bedtime. Max Daily Amount: 200 mg    Petr Duenas MD   butalbital-acetaminophen-caffeine (FIORICET, ESGIC) -40 MG per tablet Take 1 tablet by mouth every 6 hours as needed 2/13/23   Automatic Reconciliation, Ar   naloxone 4 MG/0.1ML LIQD nasal spray Use 1 spray

## 2024-10-08 NOTE — PLAN OF CARE
Problem: Chronic Conditions and Co-morbidities  Goal: Patient's chronic conditions and co-morbidity symptoms are monitored and maintained or improved  Outcome: Adequate for Discharge     Problem: Safety - Adult  Goal: Free from fall injury  Outcome: Adequate for Discharge     Problem: ABCDS Injury Assessment  Goal: Absence of physical injury  Outcome: Adequate for Discharge     Problem: Pain  Goal: Verbalizes/displays adequate comfort level or baseline comfort level  Outcome: Adequate for Discharge     Problem: Discharge Planning  Goal: Discharge to home or other facility with appropriate resources  Outcome: Adequate for Discharge     Problem: Respiratory - Adult  Goal: Achieves optimal ventilation and oxygenation  10/8/2024 1744 by Rachel Henley RN  Outcome: Adequate for Discharge  10/8/2024 0759 by Randi Sutton, RT  Outcome: Progressing

## 2024-10-08 NOTE — DISCHARGE SUMMARY
Gynecology Discharge Summary     Patient ID:  Anette Cannon  [id number]  54 y.o.  1970    Admit date: 10/7/2024    Discharge date: 10/8/2024     Admission Diagnoses:   Patient Active Problem List   Diagnosis    COPD exacerbation (HCC)    Endometriosis    Tobacco abuse    Aspiration pneumonia (HCC)    CKD (chronic kidney disease) stage 3, GFR 30-59 ml/min (HCC)    Essential hypertension    Severe obesity    Change in mental status    Abnormal weight gain    Drug overdose    PERLA (acute kidney injury) (HCC)    Respiratory failure, acute    Elevated LFTs    Positive blood culture    Hyperlipidemia LDL goal <100    Sepsis (HCC)    Anxiety and depression    Diabetic nephropathy associated with type 1 diabetes mellitus (HCC)    Uncontrolled type I diabetes mellitus with neuropathy    Bilateral leg edema    DKA (diabetic ketoacidosis) (HCC)    Hypoglycemia    Hyponatremia    COVID-19    Type 1 diabetes mellitus with hyperglycemia (HCC)    Hypoglycemia due to type 1 diabetes mellitus (HCC)    Hypoglycemia associated with diabetes (HCC)    Hyperglycemia    Drug-induced Parkinson's disease (HCC)    Benign essential tremor syndrome    Tremors of nervous system    Hyperthyroidism    Diabetic peripheral neuropathy associated with type 1 diabetes mellitus (HCC)    Chronic hypoxic respiratory failure    Chronic hypoxemic respiratory failure    Acute congestive heart failure, unspecified heart failure type (HCC)    Acute decompensated heart failure (HCC)    S/P total hysterectomy       Discharge Diagnoses: There are no discharge diagnoses documented for the most recent discharge.  Patient Active Problem List   Diagnosis    COPD exacerbation (HCC)    Endometriosis    Tobacco abuse    Aspiration pneumonia (HCC)    CKD (chronic kidney disease) stage 3, GFR 30-59 ml/min (HCC)    Essential hypertension    Severe obesity    Change in mental status    Abnormal weight gain    Drug overdose    PERLA (acute kidney injury) (HCC)

## 2024-10-08 NOTE — PROGRESS NOTES
visited with pt in pre-op.  
End of Shift Note    Bedside shift change report given to ADELIA Ramos (oncoming nurse) by Georgia Atkinson RN (offgoing nurse).  Report included the following information SBAR, Kardex, OR Summary, Procedure Summary, Intake/Output, MAR, and Recent Results    Shift worked:  1815-3281     Shift summary and any significant changes:     Pain managed w/ PRN IV morphine - 4 doses of 4 mg IV morphine given - advised pt to try PO medications going forward - pt receptive.     VSS, pt voiding well over shift.     No c/o nausea, no emesis    Received orders for AC/HS blood sugar checks and insulin.     Concerns for physician to address:       Zone phone for oncoming shift:   [phone]         Georgia Atkinson RN                            
End of Shift Note    Bedside shift change report given to Tia DELVALLE (oncoming nurse) by Argenis Mercado RN (offgoing nurse).  Report included the following information SBAR, Kardex, OR Summary, Procedure Summary, Intake/Output, MAR, Accordion, Recent Results, and Med Rec Status    Shift worked:  7a-7p     Shift summary and any significant changes:     Pt admit from PACU, vomited 250cc, notified Dr Barakat, NS 0.9 at 125/hr ordered. Pain med x1, xanax x1. - flatus.     Concerns for physician to address:  none     Zone phone for oncoming shift:   [phone]       Activity:     Number times ambulated in hallways past shift: 0  Number of times OOB to chair past shift: 2    Cardiac:   Cardiac Monitoring: No           Access:  Current line(s): PIV     Genitourinary:   Urinary status: voiding    Respiratory:      Chronic home O2 use?: NO  Incentive spirometer at bedside: NO       GI:     Current diet:  ADULT DIET; Clear Liquid  DIET ONE TIME MESSAGE;  Passing flatus: NO  Tolerating current diet: YES       Pain Management:   Patient states pain is manageable on current regimen: YES    Skin:     Interventions: float heels and increase time out of bed    Patient Safety:  Fall Score:    Interventions: assistive device (walker, cane. etc), gripper socks, and pt to call before getting OOB       Length of Stay:  Expected LOS: 1  Actual LOS: 0      Argenis Mercado RN                            
Nursing contacted Nocturnist/cross cover provider via non-urgent messaging system [employer] and notified patient glucose 402, asymptomatic, is T1DM, hospitalist appears was consulted for med mgmt- their completed note is pending at present. No other concerns reported. No acute distress reported. No other information provided by nurse. VSS.     Ordered accuchecks achs and 0200, 5 units total lispro x1 now, high dose ssi, appears gets 6 units tid w/ meals novolog and then 1 unit per 50 above that at home, continued lantus 30 units daily as takes at home, A1c in am ordered. Will defer further evaluation/management to the day shift primary attending care team. Patient denies any further complaints or concerns.     Nursing to notify Hospitalist for further/continued concerns. Will remain available overnight for further concerns if nursing/patient needs. Please note, there are RRT systems in this hospital in place that if nursing has acute or critical patient condition change or concern, this is to help facilitate and notify that patient needs immediate bedside evaluation by a provider.     Non-billable note.       
M/uL    Hemoglobin 10.1 (L) 11.5 - 16.0 g/dL    Hematocrit 32.6 (L) 35.0 - 47.0 %    MCV 77.6 (L) 80.0 - 99.0 FL    MCH 24.0 (L) 26.0 - 34.0 PG    MCHC 31.0 30.0 - 36.5 g/dL    RDW 17.5 (H) 11.5 - 14.5 %    Platelets 247 150 - 400 K/uL    MPV 10.2 8.9 - 12.9 FL    Nucleated RBCs 0.0 0  WBC    nRBC 0.00 0.00 - 0.01 K/uL   POCT Glucose    Collection Time: 10/08/24  2:48 AM   Result Value Ref Range    POC Glucose 246 (H) 65 - 117 mg/dL    Performed by: Demetrio Ho RN    POCT Glucose    Collection Time: 10/08/24  6:43 AM   Result Value Ref Range    POC Glucose 237 (H) 65 - 117 mg/dL    Performed by: Isidro SMILEY

## 2024-10-09 ENCOUNTER — TELEPHONE (OUTPATIENT)
Age: 54
End: 2024-10-09

## 2024-10-09 RX ORDER — LEVOTHYROXINE SODIUM 112 UG/1
112 TABLET ORAL DAILY
Qty: 90 TABLET | Refills: 3 | Status: SHIPPED | OUTPATIENT
Start: 2024-10-09

## 2024-10-09 NOTE — TELEPHONE ENCOUNTER
Please apologize to her that I accidentally forgot to send this in and just did it now so it should be ready for  today.

## 2024-10-09 NOTE — TELEPHONE ENCOUNTER
Pt LVM stating she went to  the RX for the new dose of levothyroxine and the pharmacy stated they hadn't received it yet. She asked that it be sent so she can pick it up.

## 2024-10-16 RX ORDER — GLUCAGON HYDROCHLORIDE 1 MG
KIT INJECTION
Qty: 4 EACH | Refills: 11 | Status: SHIPPED | OUTPATIENT
Start: 2024-10-16

## 2024-10-16 NOTE — TELEPHONE ENCOUNTER
Pt LVM stating she needs a refill on glucagon. Pt states she had to use 4 pens due to her sugars dropping 20-50's.

## 2024-10-28 ENCOUNTER — HOSPITAL ENCOUNTER (EMERGENCY)
Facility: HOSPITAL | Age: 54
Discharge: HOME | End: 2024-10-28
Attending: EMERGENCY MEDICINE
Payer: MEDICARE

## 2024-10-28 ENCOUNTER — APPOINTMENT (OUTPATIENT)
Facility: HOSPITAL | Age: 54
End: 2024-10-28
Payer: MEDICARE

## 2024-10-28 VITALS
SYSTOLIC BLOOD PRESSURE: 109 MMHG | HEART RATE: 88 BPM | RESPIRATION RATE: 19 BRPM | BODY MASS INDEX: 26.66 KG/M2 | TEMPERATURE: 98.7 F | DIASTOLIC BLOOD PRESSURE: 58 MMHG | HEIGHT: 65 IN | OXYGEN SATURATION: 94 % | WEIGHT: 160 LBS

## 2024-10-28 DIAGNOSIS — R11.2 NAUSEA AND VOMITING, UNSPECIFIED VOMITING TYPE: Primary | ICD-10-CM

## 2024-10-28 LAB
ALBUMIN SERPL-MCNC: 3.1 G/DL (ref 3.5–5)
ALBUMIN/GLOB SERPL: 1 (ref 1.1–2.2)
ALP SERPL-CCNC: 93 U/L (ref 45–117)
ALT SERPL-CCNC: 97 U/L (ref 12–78)
ANION GAP BLD CALC-SCNC: 9 (ref 10–20)
ANION GAP SERPL CALC-SCNC: 8 MMOL/L (ref 2–12)
APPEARANCE UR: CLEAR
AST SERPL-CCNC: 25 U/L (ref 15–37)
BACTERIA URNS QL MICRO: NEGATIVE /HPF
BASE EXCESS BLD CALC-SCNC: 3.7 MMOL/L
BASOPHILS # BLD: 0.1 K/UL (ref 0–0.1)
BASOPHILS NFR BLD: 1 % (ref 0–1)
BILIRUB SERPL-MCNC: 0.6 MG/DL (ref 0.2–1)
BILIRUB UR QL: NEGATIVE
BUN SERPL-MCNC: 19 MG/DL (ref 6–20)
BUN/CREAT SERPL: 13 (ref 12–20)
CA-I BLD-MCNC: 1.12 MMOL/L (ref 1.15–1.33)
CALCIUM SERPL-MCNC: 8.9 MG/DL (ref 8.5–10.1)
CHLORIDE BLD-SCNC: 101 MMOL/L (ref 100–111)
CHLORIDE SERPL-SCNC: 102 MMOL/L (ref 97–108)
CO2 BLD-SCNC: 28 MMOL/L (ref 22–29)
CO2 SERPL-SCNC: 29 MMOL/L (ref 21–32)
COLOR UR: ABNORMAL
CREAT SERPL-MCNC: 1.42 MG/DL (ref 0.55–1.02)
CREAT UR-MCNC: 1.1 MG/DL (ref 0.6–1.3)
DIFFERENTIAL METHOD BLD: ABNORMAL
EOSINOPHIL # BLD: 0 K/UL (ref 0–0.4)
EOSINOPHIL NFR BLD: 0 % (ref 0–7)
EPITH CASTS URNS QL MICRO: ABNORMAL /LPF
ERYTHROCYTE [DISTWIDTH] IN BLOOD BY AUTOMATED COUNT: 20.1 % (ref 11.5–14.5)
GLOBULIN SER CALC-MCNC: 3.2 G/DL (ref 2–4)
GLUCOSE BLD STRIP.AUTO-MCNC: 246 MG/DL (ref 74–99)
GLUCOSE SERPL-MCNC: 239 MG/DL (ref 65–100)
GLUCOSE UR STRIP.AUTO-MCNC: 250 MG/DL
HCO3 BLDA-SCNC: 28 MMOL/L
HCT VFR BLD AUTO: 34.4 % (ref 35–47)
HGB BLD-MCNC: 10.5 G/DL (ref 11.5–16)
HGB UR QL STRIP: NEGATIVE
IMM GRANULOCYTES # BLD AUTO: 0 K/UL (ref 0–0.04)
IMM GRANULOCYTES NFR BLD AUTO: 0 % (ref 0–0.5)
KETONES UR QL STRIP.AUTO: 40 MG/DL
LACTATE BLD-SCNC: 1.09 MMOL/L (ref 0.4–2)
LEUKOCYTE ESTERASE UR QL STRIP.AUTO: ABNORMAL
LIPASE SERPL-CCNC: 17 U/L (ref 13–75)
LYMPHOCYTES # BLD: 1.5 K/UL (ref 0.8–3.5)
LYMPHOCYTES NFR BLD: 24 % (ref 12–49)
MCH RBC QN AUTO: 23.9 PG (ref 26–34)
MCHC RBC AUTO-ENTMCNC: 30.5 G/DL (ref 30–36.5)
MCV RBC AUTO: 78.2 FL (ref 80–99)
MONOCYTES # BLD: 0.4 K/UL (ref 0–1)
MONOCYTES NFR BLD: 7 % (ref 5–13)
NEUTS SEG # BLD: 4.2 K/UL (ref 1.8–8)
NEUTS SEG NFR BLD: 68 % (ref 32–75)
NITRITE UR QL STRIP.AUTO: NEGATIVE
NRBC # BLD: 0 K/UL (ref 0–0.01)
NRBC BLD-RTO: 0 PER 100 WBC
PCO2 BLDV: 39.8 MMHG (ref 41–51)
PH BLDV: 7.45 (ref 7.32–7.42)
PH UR STRIP: 5.5 (ref 5–8)
PLATELET # BLD AUTO: 388 K/UL (ref 150–400)
PMV BLD AUTO: 10.5 FL (ref 8.9–12.9)
PO2 BLDV: 35 MMHG (ref 25–40)
POTASSIUM BLD-SCNC: 3.3 MMOL/L (ref 3.5–5.5)
POTASSIUM SERPL-SCNC: 3.5 MMOL/L (ref 3.5–5.1)
PROT SERPL-MCNC: 6.3 G/DL (ref 6.4–8.2)
PROT UR STRIP-MCNC: 30 MG/DL
RBC # BLD AUTO: 4.4 M/UL (ref 3.8–5.2)
RBC #/AREA URNS HPF: ABNORMAL /HPF (ref 0–5)
RBC MORPH BLD: ABNORMAL
RBC MORPH BLD: ABNORMAL
SAO2 % BLD: 70 % (ref 94–98)
SODIUM BLD-SCNC: 138 MMOL/L (ref 136–145)
SODIUM SERPL-SCNC: 139 MMOL/L (ref 136–145)
SP GR UR REFRACTOMETRY: 1.03
SPECIMEN SITE: ABNORMAL
TROPONIN I SERPL HS-MCNC: 14 NG/L (ref 0–51)
URINE CULTURE IF INDICATED: ABNORMAL
UROBILINOGEN UR QL STRIP.AUTO: 0.2 EU/DL (ref 0.2–1)
WBC # BLD AUTO: 6.2 K/UL (ref 3.6–11)
WBC URNS QL MICRO: ABNORMAL /HPF (ref 0–4)

## 2024-10-28 PROCEDURE — 82947 ASSAY GLUCOSE BLOOD QUANT: CPT

## 2024-10-28 PROCEDURE — 81001 URINALYSIS AUTO W/SCOPE: CPT

## 2024-10-28 PROCEDURE — 6360000004 HC RX CONTRAST MEDICATION: Performed by: EMERGENCY MEDICINE

## 2024-10-28 PROCEDURE — 36415 COLL VENOUS BLD VENIPUNCTURE: CPT

## 2024-10-28 PROCEDURE — 80053 COMPREHEN METABOLIC PANEL: CPT

## 2024-10-28 PROCEDURE — 82803 BLOOD GASES ANY COMBINATION: CPT

## 2024-10-28 PROCEDURE — 99285 EMERGENCY DEPT VISIT HI MDM: CPT

## 2024-10-28 PROCEDURE — 85025 COMPLETE CBC W/AUTO DIFF WBC: CPT

## 2024-10-28 PROCEDURE — 82330 ASSAY OF CALCIUM: CPT

## 2024-10-28 PROCEDURE — 71045 X-RAY EXAM CHEST 1 VIEW: CPT

## 2024-10-28 PROCEDURE — 83690 ASSAY OF LIPASE: CPT

## 2024-10-28 PROCEDURE — 93005 ELECTROCARDIOGRAM TRACING: CPT | Performed by: EMERGENCY MEDICINE

## 2024-10-28 PROCEDURE — 84295 ASSAY OF SERUM SODIUM: CPT

## 2024-10-28 PROCEDURE — 74177 CT ABD & PELVIS W/CONTRAST: CPT

## 2024-10-28 PROCEDURE — 84132 ASSAY OF SERUM POTASSIUM: CPT

## 2024-10-28 PROCEDURE — 84484 ASSAY OF TROPONIN QUANT: CPT

## 2024-10-28 RX ORDER — ONDANSETRON 2 MG/ML
4 INJECTION INTRAMUSCULAR; INTRAVENOUS ONCE
Status: DISCONTINUED | OUTPATIENT
Start: 2024-10-28 | End: 2024-10-28 | Stop reason: HOSPADM

## 2024-10-28 RX ORDER — ONDANSETRON 4 MG/1
4 TABLET, ORALLY DISINTEGRATING ORAL 3 TIMES DAILY PRN
Qty: 21 TABLET | Refills: 0 | Status: SHIPPED | OUTPATIENT
Start: 2024-10-28

## 2024-10-28 RX ORDER — IOPAMIDOL 755 MG/ML
100 INJECTION, SOLUTION INTRAVASCULAR
Status: COMPLETED | OUTPATIENT
Start: 2024-10-28 | End: 2024-10-28

## 2024-10-28 RX ADMIN — IOPAMIDOL 100 ML: 755 INJECTION, SOLUTION INTRAVENOUS at 14:48

## 2024-10-28 ASSESSMENT — PAIN DESCRIPTION - LOCATION: LOCATION: BACK

## 2024-10-28 ASSESSMENT — PAIN SCALES - GENERAL: PAINLEVEL_OUTOF10: 10

## 2024-10-28 NOTE — ED NOTES
Patient taken to CT. Upon return of bathroom she stated \"I dont know how to do this\" and wsa unable to urinate in cup. [place] notified.

## 2024-10-28 NOTE — ED NOTES
Patient given instructions and urine cup, she verbalized understanding. Pt is ambulatory to the restroom.

## 2024-10-28 NOTE — ED PROVIDER NOTES
[facility] EMERGENCY DEPT  EMERGENCY DEPARTMENT ENCOUNTER       Pt Name: Anette Cannon  MRN: [id number]  Birthdate 1970  Date of evaluation: 10/28/2024  Provider: Jane Gay MD   PCP: Jair Vieira MD  Note Started: 4:49 PM EDT 10/28/24     CHIEF COMPLAINT       Chief Complaint   Patient presents with    Vomiting     Pt arrives via EMS c/o emesis x3 days. Confirmes diarrhea. Denies CP or SOB. Recently had a hysterectomy and gal bladder removal (per pt)        HISTORY OF PRESENT ILLNESS: 1 or more elements      History From: Patient, History limited by: none     Anette Cannon is a 54 y.o. female who presents with a chief complaint of nausea, vomiting, and feeling generally unwell       Please See MDM for Additional Details of the HPI/PMH  Nursing Notes were all reviewed and agreed with or any disagreements were addressed in the HPI.     REVIEW OF SYSTEMS        Positives and Pertinent negatives as per HPI.    PAST HISTORY     Past Medical History:  Past Medical History:   Diagnosis Date    Achilles tendon rupture     along with torn right patellar/femoral ligament    Arthritis     rt. foot    Chronic kidney disease (CKD), stage III (moderate) ([facility])     Dr. Abdulaziz Malik, [facility]    Chronic pain     abdominal pain    Coarse tremor     left arm    COPD (chronic obstructive pulmonary disease) ([facility])     PAR    DM type 1 (diabetes mellitus, type 1) ([facility])     age 22    Endometriosis     s/p ex-lap 4x    Gastric ulcer     GERD (gastroesophageal reflux disease)     Herpes     Hypothyroidism     Mild pulmonary hypertension (HCC)     Other and unspecified hyperlipidemia     Panic attacks     PFO (patent foramen ovale)     s/p repair    Psychiatric disorder     anxiety, panic attacks    PTSD (post-traumatic stress disorder)     Unspecified essential hypertension     Venous insufficiency        Past Surgical History:  Past Surgical History:   Procedure Laterality Date    ACHILLES TENDON SURGERY Right

## 2024-10-30 LAB
EKG ATRIAL RATE: 88 BPM
EKG DIAGNOSIS: NORMAL
EKG P AXIS: 67 DEGREES
EKG P-R INTERVAL: 148 MS
EKG Q-T INTERVAL: 400 MS
EKG QRS DURATION: 80 MS
EKG QTC CALCULATION (BAZETT): 484 MS
EKG R AXIS: 1 DEGREES
EKG T AXIS: 69 DEGREES
EKG VENTRICULAR RATE: 88 BPM

## 2024-11-01 DIAGNOSIS — E78.2 MIXED HYPERLIPIDEMIA: ICD-10-CM

## 2024-11-01 DIAGNOSIS — E10.65 TYPE 1 DIABETES MELLITUS WITH HYPERGLYCEMIA (HCC): ICD-10-CM

## 2024-11-01 DIAGNOSIS — R94.6 ABNORMAL RESULTS OF THYROID FUNCTION STUDIES: ICD-10-CM

## 2024-11-01 DIAGNOSIS — R79.89 ELEVATED LFTS: ICD-10-CM

## 2024-11-01 DIAGNOSIS — I10 ESSENTIAL (PRIMARY) HYPERTENSION: ICD-10-CM

## 2024-11-08 ENCOUNTER — OFFICE VISIT (OUTPATIENT)
Age: 54
End: 2024-11-08
Payer: MEDICARE

## 2024-11-08 VITALS
BODY MASS INDEX: 25.99 KG/M2 | HEART RATE: 98 BPM | WEIGHT: 156 LBS | DIASTOLIC BLOOD PRESSURE: 72 MMHG | HEIGHT: 65 IN | SYSTOLIC BLOOD PRESSURE: 130 MMHG

## 2024-11-08 DIAGNOSIS — I10 ESSENTIAL (PRIMARY) HYPERTENSION: ICD-10-CM

## 2024-11-08 DIAGNOSIS — R79.89 ELEVATED LFTS: ICD-10-CM

## 2024-11-08 DIAGNOSIS — E03.9 ACQUIRED HYPOTHYROIDISM: ICD-10-CM

## 2024-11-08 DIAGNOSIS — E78.2 MIXED HYPERLIPIDEMIA: ICD-10-CM

## 2024-11-08 DIAGNOSIS — E10.65 TYPE 1 DIABETES MELLITUS WITH HYPERGLYCEMIA (HCC): Primary | ICD-10-CM

## 2024-11-08 PROCEDURE — G8427 DOCREV CUR MEDS BY ELIG CLIN: HCPCS | Performed by: INTERNAL MEDICINE

## 2024-11-08 PROCEDURE — G8484 FLU IMMUNIZE NO ADMIN: HCPCS | Performed by: INTERNAL MEDICINE

## 2024-11-08 PROCEDURE — G8417 CALC BMI ABV UP PARAM F/U: HCPCS | Performed by: INTERNAL MEDICINE

## 2024-11-08 PROCEDURE — G2211 COMPLEX E/M VISIT ADD ON: HCPCS | Performed by: INTERNAL MEDICINE

## 2024-11-08 PROCEDURE — 99214 OFFICE O/P EST MOD 30 MIN: CPT | Performed by: INTERNAL MEDICINE

## 2024-11-08 PROCEDURE — 2022F DILAT RTA XM EVC RTNOPTHY: CPT | Performed by: INTERNAL MEDICINE

## 2024-11-08 PROCEDURE — 3075F SYST BP GE 130 - 139MM HG: CPT | Performed by: INTERNAL MEDICINE

## 2024-11-08 PROCEDURE — 95251 CONT GLUC MNTR ANALYSIS I&R: CPT | Performed by: INTERNAL MEDICINE

## 2024-11-08 PROCEDURE — 3078F DIAST BP <80 MM HG: CPT | Performed by: INTERNAL MEDICINE

## 2024-11-08 PROCEDURE — 3017F COLORECTAL CA SCREEN DOC REV: CPT | Performed by: INTERNAL MEDICINE

## 2024-11-08 PROCEDURE — 4004F PT TOBACCO SCREEN RCVD TLK: CPT | Performed by: INTERNAL MEDICINE

## 2024-11-08 PROCEDURE — 3052F HG A1C>EQUAL 8.0%<EQUAL 9.0%: CPT | Performed by: INTERNAL MEDICINE

## 2024-11-08 RX ORDER — INSULIN ASPART 100 [IU]/ML
INJECTION, SOLUTION INTRAVENOUS; SUBCUTANEOUS
Qty: 20 ML | Refills: 11 | Status: SHIPPED | OUTPATIENT
Start: 2024-11-08

## 2024-11-08 RX ORDER — INSULIN GLARGINE 100 [IU]/ML
INJECTION, SOLUTION SUBCUTANEOUS
Qty: 45 ML | Refills: 3 | Status: SHIPPED | OUTPATIENT
Start: 2024-11-08

## 2024-11-08 NOTE — PROGRESS NOTES
Chief Complaint   Patient presents with    Diabetes     PCP and pharmacy confirmed     History of Present Illness: Anette Cannon is a 54 y.o. female here for follow up of diabetes.  Weight down 31 lbs since last visit in 8/24.  She underwent hysterectomy with Dr. Herron on 10/7/24 and then needed her gallbladder removed at 10/12/24 at [facility].  She has not had any pain meds for the past 10 days and has gotten through the worst of the withdrawal symptoms and will see Dr. Herron on Monday and plans to stay off pain meds.  She was having higher sugars went not eating close to 300 so about a week ago she increased her lantus up to 36 units daily.  Today she ate a biscuit for breakfast and took 36 units of lantus and no novolog and her sugar dropped to 55 during her visit and with 12 oz of apple juice it came up to 110 by the end of her visit.  Review of her joanne data over the past 30 days shows 39% in range, 57% above range and 4% below range.  She continues to have widely fluctuating sugars under 50 to over 400.  Compliant with levothyroxine 112 mcg daily.  Will go for labs today.  Will be seeing a new podiatrist on Monday to do her foot surgery now that Dr. Nolasco has moved to [place].    she has the following indications to conting treatment with Freestyle Joanne:  1) she has type 1 diabetes (E10.65) and is on an intensive insulin regimen with 4 injections per day  2) she tests her blood sugar 10 times per day and makes treatment decisions off her blood sugar readings and off freestyle joanne sensor readings  3) she requires frequent adjustments to her insulin injection doses based on her freestyle joanne sensor readings  4) she has benefitted from therapeutic continuous glucose monitoring and I recommend that she continue this  5) she is seen in my office every 1-3 months              Current Outpatient Medications   Medication Sig    NOVOLOG 100 UNIT/ML injection vial Inject 5 units before meals + 1 units for every 50

## 2024-11-08 NOTE — PATIENT INSTRUCTIONS
Decrease lantus to 25 units daily and the novolog per the scale below:    Eating  Blood sugar  Insulin dose          Less than 110 Eat first, take 4 units (don't skip)    110-150  5 units    151-200  6 units      201-250  7 units     251-300  8 units      301-350  9 units      351-400  10 units  401-450  11 units  451-500  12 units  Over 500  13 units    Not Eating (to correct for high sugars at least 3 hours after last dose of novolog)  Blood sugar  Insulin dose          Less than 200  None    200-275  2 units    276-350  3 units      351-425  4 units     425-475  5 units   476-525       6 units  Over 525  7 units    2) If your sugar reads \"hi\" on the michele, please check it on your finger to decide how much novolog to take.  Do not take a dose of novolog more frequently than every 2.5 hours or you will stack your novolog and cause a low sugar.

## 2024-11-09 ENCOUNTER — TELEPHONE (OUTPATIENT)
Age: 54
End: 2024-11-09

## 2024-11-09 LAB
ALBUMIN SERPL-MCNC: 4.3 G/DL (ref 3.8–4.9)
ALP SERPL-CCNC: 96 IU/L (ref 44–121)
ALT SERPL-CCNC: 20 IU/L (ref 0–32)
AST SERPL-CCNC: 26 IU/L (ref 0–40)
BILIRUB SERPL-MCNC: 0.3 MG/DL (ref 0–1.2)
BUN SERPL-MCNC: 12 MG/DL (ref 6–24)
BUN/CREAT SERPL: 12 (ref 9–23)
CALCIUM SERPL-MCNC: 9.7 MG/DL (ref 8.7–10.2)
CHLORIDE SERPL-SCNC: 100 MMOL/L (ref 96–106)
CHOLEST SERPL-MCNC: 175 MG/DL (ref 100–199)
CO2 SERPL-SCNC: 19 MMOL/L (ref 20–29)
CREAT SERPL-MCNC: 1 MG/DL (ref 0.57–1)
EGFRCR SERPLBLD CKD-EPI 2021: 67 ML/MIN/1.73
GLOBULIN SER CALC-MCNC: 2.6 G/DL (ref 1.5–4.5)
GLUCOSE SERPL-MCNC: 91 MG/DL (ref 70–99)
HBA1C MFR BLD: 9.4 % (ref 4.8–5.6)
HDLC SERPL-MCNC: 47 MG/DL
IMP & REVIEW OF LAB RESULTS: NORMAL
LDLC SERPL CALC-MCNC: 96 MG/DL (ref 0–99)
Lab: NORMAL
POTASSIUM SERPL-SCNC: 4.1 MMOL/L (ref 3.5–5.2)
PROT SERPL-MCNC: 6.9 G/DL (ref 6–8.5)
SODIUM SERPL-SCNC: 140 MMOL/L (ref 134–144)
T4 FREE SERPL-MCNC: 0.87 NG/DL (ref 0.82–1.77)
TRIGL SERPL-MCNC: 187 MG/DL (ref 0–149)
TSH SERPL DL<=0.005 MIU/L-ACNC: 3.48 UIU/ML (ref 0.45–4.5)
VLDLC SERPL CALC-MCNC: 32 MG/DL (ref 5–40)

## 2024-11-09 RX ORDER — LEVOTHYROXINE SODIUM 125 UG/1
125 TABLET ORAL DAILY
Qty: 90 TABLET | Refills: 3 | Status: SHIPPED | OUTPATIENT
Start: 2024-11-09

## 2024-11-09 NOTE — TELEPHONE ENCOUNTER
Please call her with her results and let her know I sent her the following message in a letter:    Hemoglobin A1c is a 3 month marker of your diabetes control.  Goal is less than 7.5%.  Yours is 9.6% and up from 8.6% last month but hopefully the new scale and having less pain will help improve your control.  Continue to work on your diet and exercise and take all your medications as directed.  -------------------------------------------------------------------------------------------------------------------  Your cholesterol is at goal aside from the high triglycerides (short term fats) which should come down with a lower A1c.  -------------------------------------------------------------------------------------------------------------------  BUN and creatinine are markers of kidney function.  Your values are normal.  -------------------------------------------------------------------------------------------------------------------  ALT and AST are markers of liver function.  Your values are normal.  -------------------------------------------------------------------------------------------------------------------  Your sodium level is normal at 140.  -------------------------------------------------------------------------------------------------------------------  TSH is a thyroid test.  Your level is 3.48 which is normal but still above my goal of 0.45-2.0.  The TSH goes opposite of your thyroid dose and suggests your dose of levothyroxine is still not enough.  I will increase your dose to 125 mcg daily and have sent a new prescription to your local pharmacy.  -------------------------------------------------------------------------------------------------------------------  Take the enclosed lab slip to repeat your labs prior to your next visit on 2/18/25.

## 2024-11-10 NOTE — PROGRESS NOTES
Spoke with MsRenee Anette over the phone at 6 PM, she has called on-call endocrinology because her blood sugar was 560 after lunch, indicates she woke up with blood sugar of 68 and she had soup for lunch and afterwards her blood sugar was 560, she took 6 units of mealtime insulin and checked her blood sugar 2 hours later, advised to take 3 units of insulin at this time and will follow-up in 2 hours.  I contacted patient again now at 8 PM and her blood sugar reading dropped to 56, she ate dinner pasta and meatballs and her blood sugar is now 114 and feels she is doing well.  Advised to call back if any difficulty with managing her blood sugars and she indicates understanding

## 2024-11-14 ENCOUNTER — TELEPHONE (OUTPATIENT)
Age: 54
End: 2024-11-14

## 2024-11-14 DIAGNOSIS — E10.42 DIABETIC PERIPHERAL NEUROPATHY ASSOCIATED WITH TYPE 1 DIABETES MELLITUS (HCC): ICD-10-CM

## 2024-11-14 DIAGNOSIS — G25.0 BENIGN ESSENTIAL TREMOR SYNDROME: ICD-10-CM

## 2024-11-14 DIAGNOSIS — R25.1 TREMORS OF NERVOUS SYSTEM: ICD-10-CM

## 2024-11-14 DIAGNOSIS — G21.19 DRUG-INDUCED PARKINSON'S DISEASE (HCC): ICD-10-CM

## 2024-11-14 RX ORDER — CARBIDOPA AND LEVODOPA 25; 100 MG/1; MG/1
1 TABLET ORAL 2 TIMES DAILY
Qty: 90 TABLET | Refills: 3 | OUTPATIENT
Start: 2024-11-14

## 2024-11-14 RX ORDER — INSULIN GLARGINE 100 [IU]/ML
INJECTION, SOLUTION SUBCUTANEOUS
Qty: 45 ML | Refills: 3 | Status: SHIPPED | OUTPATIENT
Start: 2024-11-14

## 2024-11-14 RX ORDER — CARBIDOPA AND LEVODOPA 25; 100 MG/1; MG/1
1 TABLET ORAL 2 TIMES DAILY
Qty: 90 TABLET | Refills: 11 | OUTPATIENT
Start: 2024-11-14

## 2024-11-14 NOTE — TELEPHONE ENCOUNTER
Last office visit: 10/10/2023  Next office visit: No future visit scheduled  Last med refill:      The original prescription was discontinued on 11/30/2023 by Steven Schmitt MD for the following reason: LIST CLEANUP. Renewing this prescription may not be appropriate.

## 2024-11-14 NOTE — TELEPHONE ENCOUNTER
Pt LVM stating her sugar dropped very low and needs a call back.  Pt stated her blood glucose dropped to 23 today. She stated she had to eat a honey bun, a cupcake, popcorn, juice and it was still bottoming out. Pt is asking for Dr Schmitt to call and discuss a scale for her Lantus. She stated the 25 units is too much. She stated she had a drs appt this AM and ran a few errands then she bottomed out. Pt states she was able to give herself glucagon but feels maybe she can have a scale for the Lantus as well.

## 2024-11-14 NOTE — TELEPHONE ENCOUNTER
Please let her know she doesn't need a scale for the lantus as this should not be adjusted day to day.  If she is following the scale I gave her for novolog and is having lows then she just needs less lantus and she should decrease her dose from 25 units daily to 20 units daily starting tomorrow.  If she is still having low sugars on 20 units over the weekend, then she can further decrease to 18 units daily.

## 2024-11-17 ENCOUNTER — TELEPHONE (OUTPATIENT)
Age: 54
End: 2024-11-17

## 2024-11-17 DIAGNOSIS — E10.65 TYPE 1 DIABETES MELLITUS WITH HYPERGLYCEMIA (HCC): Primary | ICD-10-CM

## 2024-11-17 RX ORDER — ACYCLOVIR 800 MG/1
TABLET ORAL
Qty: 2 EACH | Refills: 0 | Status: SHIPPED | OUTPATIENT
Start: 2024-11-17

## 2024-11-19 ENCOUNTER — TELEPHONE (OUTPATIENT)
Age: 54
End: 2024-11-19

## 2024-11-19 RX ORDER — INSULIN GLARGINE 100 [IU]/ML
INJECTION, SOLUTION SUBCUTANEOUS
Qty: 45 ML | Refills: 3 | Status: SHIPPED | OUTPATIENT
Start: 2024-11-19 | End: 2024-11-21 | Stop reason: DRUGHIGH

## 2024-11-19 NOTE — TELEPHONE ENCOUNTER
Pt LVM stating she wanted to update Dr Schmitt on her current insulin dose. She says this AM she took 20 units because her fasting glucose this AM was 55. She wanted to let him know that she has been decreasing her insulin by 2 units.

## 2024-11-19 NOTE — TELEPHONE ENCOUNTER
I would have her decrease her lantus to 18 units in the morning starting tomorrow morning to be safe.

## 2024-11-20 ENCOUNTER — TELEPHONE (OUTPATIENT)
Age: 54
End: 2024-11-20

## 2024-11-20 NOTE — TELEPHONE ENCOUNTER
So did she never take any lantus this morning?  If so then this would be the reason her sugar is so high currently and she should take 18 units of lantus not 4 units now.

## 2024-11-20 NOTE — TELEPHONE ENCOUNTER
Patient notified of message per Dr. Schmitt and voiced understanding of what was read to them.   Pt states she will take insulin and call Dr Schmitt tomorrow to update. She wanted to let him know her glucose this AM was 275 she took 2 units of novolog and her sugar dropped to 51 but then it stayed reading \"high\" all day.

## 2024-11-20 NOTE — TELEPHONE ENCOUNTER
Pt called office stating her blood glucose was in the 500's earlier and she took 10 units of fast acting and waited 2.5 hours and the reading didn't come down. Pt stated she took another 10 units and her glucose is still high. Pt stated she is in pain and needs to know what to do. Pt also stated she has an appt with a physician tomorrow to discuss knee surgery.

## 2024-11-20 NOTE — TELEPHONE ENCOUNTER
Clarify how much lantus she took today and I would have her take another 4 units of lantus now and another 5 units of novolog to bring down her sugars and give me an update tomorrow with how her sugars are running.

## 2024-11-21 ENCOUNTER — TELEPHONE (OUTPATIENT)
Age: 54
End: 2024-11-21

## 2024-11-21 DIAGNOSIS — E10.65 TYPE 1 DIABETES MELLITUS WITH HYPERGLYCEMIA (HCC): Primary | ICD-10-CM

## 2024-11-21 RX ORDER — INSULIN GLARGINE 100 [IU]/ML
INJECTION, SOLUTION SUBCUTANEOUS
Qty: 45 ML | Refills: 3 | Status: SHIPPED | OUTPATIENT
Start: 2024-11-21

## 2024-11-21 RX ORDER — INSULIN ASPART 100 [IU]/ML
INJECTION, SOLUTION INTRAVENOUS; SUBCUTANEOUS
Qty: 20 ML | Refills: 11 | Status: SHIPPED | OUTPATIENT
Start: 2024-11-21

## 2024-11-21 NOTE — TELEPHONE ENCOUNTER
11/21/2024  8:12 AM      Good morning patient would like a call back from  her blood sugar is 83 right now.    Pt#[phone]    Thanks,  Josie Lo

## 2024-11-21 NOTE — TELEPHONE ENCOUNTER
I received a fax from [facility] on [address] ([phone]) stating michele 3 sensors and 3+ sensors are on back order.  Can you call them and see if they plan on getting them in soon and if not, do they have any michele 2 sensors.  You can also call the patient and find out if there is a different pharmacy (I think she has used [facility] on [address]) if she would like me to send the michele sensors there instead.

## 2024-11-21 NOTE — TELEPHONE ENCOUNTER
Patient called on call at 2:36 AM about her blood sugar being in 556 she ate Tuna sandwich at 6 PM the night before, advised to take 2 units of novolog and eat a small cracker now. I called her now again and states her blood sugar at this time is 83 and she feels well, advised to contact us back if any further issues with blood sugar control, patient indicates understanding and agrees with plan.

## 2024-11-21 NOTE — TELEPHONE ENCOUNTER
Called and spoke with pt.  She is having a lot of variability in her sugars due to knee pain and will be seeing her ortho to address this.  Also will be having food surgery with Dr. SANTIAGO in the near future.  I advised her to do the following with her lantus and novolog and she wrote this down and voiced understanding of the plan:    Take lantus 20 units daily and the novolog per the scale below:     Eating  Blood sugar                 Insulin dose                                                                                           Less than 110Eat first, take 3 units (don't skip)                  110-150                       4 units                151-200                       5 units                                        201-250                       6 units                            251-300                       7 units                                        301-350                       8 units                                        351-400                       9 units  401-450                       10 units  451-500                       11 units  Over 500                     12 units     Not Eating (to correct for high sugars at least 3 hours after last dose of novolog)  Blood sugar                 Insulin dose                                                                                           Less than 225             None                  225-325                       1 units                326-425                       2 units                                        Over 425                      3 units

## 2024-11-22 NOTE — TELEPHONE ENCOUNTER
I won't have a chance to call her as I'm trying to wrap up for the week.  Please let her know that I sent the sensor and reader to the pharmacies per her request.      Please remind her per our phone call yesterday she was supposed to take 20 units of lantus per day, not 18 units and this likely led to her high sugar when not eating.  Which scale did she follow that lead to the low, the eating or not eating scale that I gave her yesterday?

## 2024-11-22 NOTE — TELEPHONE ENCOUNTER
I tried to call patient again but got her voicemail and explained that Laurel has been trying to reach her and I need her to be on 20 units of lantus daily and she needs to let us know which scale caused the low sugar.  She is welcome to try taking 1 unit less from whatever scale caused the low and if she needs anything over the weekend, my partner Dr. العراقي is on call for me and I let Dr. العراقي know that pt may be calling.

## 2024-11-22 NOTE — TELEPHONE ENCOUNTER
Patient notified of message per Dr. Schmitt and voiced understanding of what was read to them.   Pt asked that Joanne 2 reader and sensors be sent to [facility] on file. She stated she does not know how to use the phone. Contacted [facility] and they confirmed they do have the sensors for Joanne 2 but not the reader in stock. Pt stated no other pharmacy has the joanne 3 or 3 plus ins stock. Pt also wanted to let Dr Schmitt know that she woke up at 3:30 AM and her blood glucose was 220. Pt stated she took Lantus 18 units and her glucose went up to 350 without eating anything. Pt says she then used her sliding scale and her glucose dropped to 38. Pt asked that Dr Johnson call her to discuss her insulin regimen as she feels since losing weight, she has become sensitive to the insulin.

## 2024-11-22 NOTE — TELEPHONE ENCOUNTER
Pt LVM stating [facility] [facility] [employer] on file has the [employer] reader. She asked that the reader only be sent to [facility] and the Joanne 2 sensors to [facility].

## 2024-11-25 ENCOUNTER — TELEPHONE (OUTPATIENT)
Age: 54
End: 2024-11-25

## 2024-11-25 RX ORDER — INSULIN GLARGINE 100 [IU]/ML
INJECTION, SOLUTION SUBCUTANEOUS
Qty: 45 ML | Refills: 3 | Status: SHIPPED | OUTPATIENT
Start: 2024-11-25

## 2024-11-25 NOTE — TELEPHONE ENCOUNTER
Called and spoke with pt.  She states her sugar is currently down to 113 on her michele and is about to eat so I advised her to take 4 units of novolog per the scale from 11/21/24 to cover the food for this sugar.  She did clarify that the low she experienced last week was because she had taken 4 units of novolog for her sugar of 350 instead of 2 units as she had looked at the wrong scale.  We agreed to stay on lantus 22 units for now while in more pain.  She will be seeing Dr. Palafox for a knee replacement new patient on 12/10/24 and I will be in touch with him about a realistic A1c goal as it would be extremely difficult to get her A1c under 8% to have this surgery and given that pain is what is driving up her sugar and keeping her sugar up just like it did prior to her recent hysterectomy and Dr. Herron agreed to do her hysterectomy with an A1c in the 8-9% range, I think the same leeway while be necessary in order for her to have her knee replaced.  I advised her to be in touch if she needed anything this week and my partners will be covering as I am headed out of town.  she voiced understanding of this plan.

## 2024-11-25 NOTE — TELEPHONE ENCOUNTER
11/25/2024  9:41 AM      Patient called and stated her blood sugar is 435 right now.    Patient#[phone]    Thanks,  necessary

## 2024-11-25 NOTE — TELEPHONE ENCOUNTER
Pt states last night her BG was 410, she took 10 units of Novolog using the scale for \"not eating.\" She then stated this AM she too Lantus 22 units (she increased on her own) and then 4 units of Novolog and now it is reading high. Pt states she is in pain, doesn't have any pain meds and was told she will need a total knee replacement. She wanted to inform Dr Schmitt of her readings.

## 2024-11-27 ENCOUNTER — TELEPHONE (OUTPATIENT)
Age: 54
End: 2024-11-27

## 2024-11-27 NOTE — TELEPHONE ENCOUNTER
Called patient ID with two identifiers. Informed patient that she needs to be seen before we can change any medication. She hasn't been seen in over a year. Advised her to schedule a appointment, and contact her PCP they may can change it. She said I will make an appointment and contact my PCP thank you.

## 2024-11-27 NOTE — TELEPHONE ENCOUNTER
Patient is asking for a new medication to replace the carbidopa levodopa. She states it's causing her to shake even more. Please call her back at . Thank you.

## 2024-12-01 RX ORDER — ACYCLOVIR 800 MG/1
TABLET ORAL
Qty: 6 EACH | Refills: 3 | Status: SHIPPED | OUTPATIENT
Start: 2024-12-01

## 2024-12-09 ENCOUNTER — TELEPHONE (OUTPATIENT)
Age: 54
End: 2024-12-09

## 2024-12-09 ENCOUNTER — OFFICE VISIT (OUTPATIENT)
Age: 54
End: 2024-12-09
Payer: MEDICARE

## 2024-12-09 VITALS
DIASTOLIC BLOOD PRESSURE: 72 MMHG | WEIGHT: 171 LBS | TEMPERATURE: 97.7 F | BODY MASS INDEX: 28.46 KG/M2 | OXYGEN SATURATION: 92 % | HEART RATE: 110 BPM | SYSTOLIC BLOOD PRESSURE: 112 MMHG

## 2024-12-09 DIAGNOSIS — E10.65 TYPE 1 DIABETES MELLITUS WITH HYPERGLYCEMIA (HCC): Primary | ICD-10-CM

## 2024-12-09 DIAGNOSIS — E10.65 TYPE 1 DIABETES MELLITUS WITH HYPERGLYCEMIA (HCC): ICD-10-CM

## 2024-12-09 DIAGNOSIS — R25.1 TREMOR: ICD-10-CM

## 2024-12-09 DIAGNOSIS — G21.19 DRUG-INDUCED PARKINSON'S DISEASE (HCC): Primary | ICD-10-CM

## 2024-12-09 PROCEDURE — 4004F PT TOBACCO SCREEN RCVD TLK: CPT | Performed by: PSYCHIATRY & NEUROLOGY

## 2024-12-09 PROCEDURE — G8484 FLU IMMUNIZE NO ADMIN: HCPCS | Performed by: PSYCHIATRY & NEUROLOGY

## 2024-12-09 PROCEDURE — G8427 DOCREV CUR MEDS BY ELIG CLIN: HCPCS | Performed by: PSYCHIATRY & NEUROLOGY

## 2024-12-09 PROCEDURE — 3074F SYST BP LT 130 MM HG: CPT | Performed by: PSYCHIATRY & NEUROLOGY

## 2024-12-09 PROCEDURE — 99214 OFFICE O/P EST MOD 30 MIN: CPT | Performed by: PSYCHIATRY & NEUROLOGY

## 2024-12-09 PROCEDURE — G8417 CALC BMI ABV UP PARAM F/U: HCPCS | Performed by: PSYCHIATRY & NEUROLOGY

## 2024-12-09 PROCEDURE — 3017F COLORECTAL CA SCREEN DOC REV: CPT | Performed by: PSYCHIATRY & NEUROLOGY

## 2024-12-09 PROCEDURE — 3078F DIAST BP <80 MM HG: CPT | Performed by: PSYCHIATRY & NEUROLOGY

## 2024-12-09 RX ORDER — CARBIDOPA AND LEVODOPA 25; 100 MG/1; MG/1
1 TABLET ORAL 3 TIMES DAILY
Qty: 90 TABLET | Refills: 3 | Status: SHIPPED | OUTPATIENT
Start: 2024-12-09

## 2024-12-09 NOTE — PROGRESS NOTES
Patient would like to discuss the parkinsons diagnosis, said she isn't confirmed on the Dx  Tremors are mainly left sided but visible through the whole body  Falls often

## 2024-12-09 NOTE — PROGRESS NOTES
[facility]  [facility]  [address]  [place] [address]  Phone: [phone] fax: [phone]      Anette Cannon (:  1970) is a 54 y.o. female,Established patient, here for evaluation of the following chief complaint(s):  Follow-up      ASSESSMENT/PLAN:  1. Drug-induced Parkinson's disease (HCC)  Assessment & Plan:  Working diagnosis is drug-induced parkinsonism.  She certainly has tremor and there is some other mild components to include extrapyramidal gait masked face etc.    Prudent to do alpha Syn-one Skin Bx biopsy to make sure in fact that we are not dealing with a component of idiopathic Parkinson's.  Patient is open to the idea.    This will be scheduled with my colleague BELL Angeles due to better availability.  This was discussed with the patient she is comfortable with having this done with BELL Angeles    For now she will continue on the Sinemet we will increase the dose to 3 times a day using the 25/100 mg dosing   Orders:  -     carbidopa-levodopa (SINEMET)  MG per tablet; Take 1 tablet by mouth 3 times daily, Disp-90 tablet, R-3Normal  2. Tremor  Assessment & Plan:  Unclear etiology presumed related to drug-induced parkinsonism for now continue on Sinemet 25/100 mg 1 tablet up to 3 times a day    We discussed behavioral issues that can contribute to worsening tremor to include fatigue poor sleep stress and anxiety   Orders:  -     Ambulatory referral to Neurology          Patient and/or family was given time to ask questions and voice concerns. I believe all questions concerns were adequately addressed at this  office visit.  Patient and/or family also verbalized agreement and understanding of the above-stated plan    Complex neurologic decision making secondary any or all of the following to include unclear etiology, and /or polypharmacy, and/or significant comorbid conditions, and/or use of high-risk medications which

## 2024-12-09 NOTE — TELEPHONE ENCOUNTER
Patient notified of message per Dr. Schmitt and voiced understanding of what was read to them.   Pt stated she would like orders placed for [facility] lab and will have drawn today in [address].

## 2024-12-09 NOTE — TELEPHONE ENCOUNTER
Please let her know that ortho will not accept a point of care A1c and will only accept one drawn from the lab in order to approve her for surgery.  I can put an order in for an A1c and fructosamine (test of your average sugar over the past 2-3 weeks), which Dr. Palafox, her ortho, said he can use to clear her for surgery either at [facility] or the [facility] lab system, which, as of 11/4/24, will be located in [facility] on the first floor [address] right past the outpatient radiology check in desks (and no longer right next to my office).  Let me know if she would like to do this.

## 2024-12-09 NOTE — TELEPHONE ENCOUNTER
Pt LVM stating she has an appt with neurology today at 3:30 PM. Pt asked if she can stop by the office to have her A1C checked via fingerstick. She says she has an appt with ortho tomorrow and wants to have most recent A1C. Pt states she is also getting a second opinion regarding possible Parkinson's disease diagnosis.

## 2024-12-09 NOTE — ASSESSMENT & PLAN NOTE
Working diagnosis is drug-induced parkinsonism.  She certainly has tremor and there is some other mild components to include extrapyramidal gait masked face etc.    Prudent to do alpha Syn-one Skin Bx biopsy to make sure in fact that we are not dealing with a component of idiopathic Parkinson's.  Patient is open to the idea.    This will be scheduled with my colleague BELL Angeles due to better availability.  This was discussed with the patient she is comfortable with having this done with BELL Angeles    For now she will continue on the Sinemet we will increase the dose to 3 times a day using the 25/100 mg dosing

## 2024-12-09 NOTE — PATIENT INSTRUCTIONS
As a reminder:   Please come to your appointment 15 minutes before your office appointment.  This way, you can get checked in at the  and checked in by the nursing staff so you have the full allotment of time with your provider for your visit.  Please bring an up-to-date and accurate list of all your medications.  Or bring all your active prescription bottles with you at the time of your office visit and this includes over-the-counter medications so we can make sure that your medication list is up-to-date.  If you are scheduled for a virtual visit, please be aware that the  will need to check you in and usually the day before to verify insurance and collect co-pays as appropriate.  Please be prepared for the second call which will be from the nurse to go over your medications and any other vital information.  This will probably be done 30 minutes prior to your visit.  The reason why we do this early is that you can get the full benefit of your appointment time with your provider.  Finally you will be given the link for your virtual visit please click into your link 10 minutes prior to your appointment and please wait patiently for the provider to join you        As per discussion  Patient Information:  Name: Anette  Age: [Patient Age]  Medical History: Anette has a history of diabetes, a hole in her heart, pulmonary problems, kidney problems, dropping sodium levels, pneumonia, and a congestive heart failure flare-up. She has also been diagnosed with drug-induced Parkinson's due to past mental health medications.    Reason for Visit:  Anette visited to evaluate her tremors and discuss her diagnosis of drug-induced Parkinson's. She has been experiencing more bad days than good, with frequent falls a few times a week. She is seeking further testing to confirm her diagnosis.    Clinical Findings:  - Anette has tremors likely due to medication effects from past mental health treatments.  - She

## 2024-12-09 NOTE — ASSESSMENT & PLAN NOTE
Unclear etiology presumed related to drug-induced parkinsonism for now continue on Sinemet 25/100 mg 1 tablet up to 3 times a day    We discussed behavioral issues that can contribute to worsening tremor to include fatigue poor sleep stress and anxiety

## 2024-12-10 LAB
ANION GAP SERPL CALC-SCNC: 8 MMOL/L (ref 2–12)
BUN SERPL-MCNC: 37 MG/DL (ref 6–20)
BUN/CREAT SERPL: 26 (ref 12–20)
CALCIUM SERPL-MCNC: 8.7 MG/DL (ref 8.5–10.1)
CHLORIDE SERPL-SCNC: 103 MMOL/L (ref 97–108)
CO2 SERPL-SCNC: 26 MMOL/L (ref 21–32)
CREAT SERPL-MCNC: 1.43 MG/DL (ref 0.55–1.02)
EST. AVERAGE GLUCOSE BLD GHB EST-MCNC: 212 MG/DL
GLUCOSE SERPL-MCNC: 149 MG/DL (ref 65–100)
HBA1C MFR BLD: 9 % (ref 4–5.6)
POTASSIUM SERPL-SCNC: 4.2 MMOL/L (ref 3.5–5.1)
SODIUM SERPL-SCNC: 137 MMOL/L (ref 136–145)

## 2024-12-11 LAB — FRUCTOSAMINE SERPL-SCNC: 346 UMOL/L (ref 0–285)

## 2024-12-12 ENCOUNTER — TELEPHONE (OUTPATIENT)
Age: 54
End: 2024-12-12

## 2024-12-12 NOTE — TELEPHONE ENCOUNTER
Pt LVM stating she saw her pulmonologist and was recommended to ask Dr Schmitt about starting pt on Lyrica for nerve pain. Pt asked that RX be sent to pharmacy on file.

## 2024-12-13 ENCOUNTER — TELEPHONE (OUTPATIENT)
Age: 54
End: 2024-12-13

## 2024-12-13 NOTE — TELEPHONE ENCOUNTER
Called and left her a voicemail that I need to speak with her about this and will call her back on Monday as I did get a chance to speak with Dr. Palafox today.  For now it's fine to decrease her lantus to 20 units.

## 2024-12-13 NOTE — TELEPHONE ENCOUNTER
Pt LVM stating she saw Dr Palafox yesterday and was told her A1C has be at least 8% for the knee replacement. Pt wanted to let Dr Schmitt know that she has still been having lows in the 30's-50's and asked if she should decrease her Lantus to 20 units from the 22 units she has been taking.

## 2024-12-13 NOTE — TELEPHONE ENCOUNTER
Called and left her a voicemail that I need to speak with her about this and will call her back on Monday.

## 2024-12-17 ENCOUNTER — TELEPHONE (OUTPATIENT)
Age: 54
End: 2024-12-17

## 2024-12-17 RX ORDER — INSULIN GLARGINE 100 [IU]/ML
INJECTION, SOLUTION SUBCUTANEOUS
Qty: 45 ML | Refills: 3 | Status: SHIPPED | OUTPATIENT
Start: 2024-12-17 | End: 2024-12-18 | Stop reason: DRUGHIGH

## 2024-12-17 NOTE — TELEPHONE ENCOUNTER
Pt LVM stating she would andrew to speak with Dr Schmitt when he is available. Pt states this AM her blood glucose was 203. She says she had a nurse from [facility] come to administer lasix via IV due to her gaining 30 lbs in one day. Pt states she was given correction for her elevated glucose this morning but her meal did not contain enough carbs and her sugar dropped to 53. They then advised her to drink juice and eat a glucose tab and now her sugar is in the 300's. Pt stated she fell and hit her head when her sugar dropped to 53. She stated she is fine but really would like to speak with Dr Schmitt.

## 2024-12-17 NOTE — TELEPHONE ENCOUNTER
Pt called again stating she will be seeing physical therapy for the fall she had due to low glucose. She stated the facility tried to adjust her insulin but she let them know she would like to speak with Dr Schmitt.

## 2024-12-17 NOTE — TELEPHONE ENCOUNTER
Called and spoke with pt about this message and the other 2 phone calls from last week regarding her blood sugars and request for lyrica.  She states she did get my message about decreasing her lantus from 22 units to 20 units and has been doing this but is still having low sugars so we agreed to further decrease her dose to 18 units.  I told her that I would e-mail her the novolog scale based on the phone call we had on 11/21/24 and while on the phone with me, she confirmed receipt of the letter that I drafted today and will let me know if she continues to have any issues with low sugars.  We discussed that lyrica is pregabalin which is very similar to gabapentin which previously was stopped due to concern for making her shakes worse and I don't think this is a good idea to use this medication while she is currently being evaluated for drug-induced parkinson's based on her visit with neurology, Rossana Treviño, on 12/9/24 and she agrees with this plan.  We also discussed my conversation with Dr. Palafox that he needs her A1c to be under 8% to move forward with knee surgery or have a fructosamine under 293 and currently her A1c was 9% on 12/9/24 and down to 8.8% at [facility] on 12/15/24 and her fructosamine is too high at 346.  She will keep her appointment with me on 2/18/25 and hopefully we can get her A1c under 8% by then.  She reached out to her PCP, Dr. Vieira to get some tylenol #3 for her knee pain as her pain is much worse now that she has been weaned off the morphine after completing her hysterectomy with Dr. Herron.  I told her I would mail her a letter with her lab results and to contact me with any trouble with her blood sugars prior to her next visit.  she voiced understanding of this plan.

## 2024-12-18 ENCOUNTER — TELEPHONE (OUTPATIENT)
Age: 54
End: 2024-12-18

## 2024-12-18 RX ORDER — INSULIN LISPRO 100 [IU]/ML
INJECTION, SOLUTION INTRAVENOUS; SUBCUTANEOUS
Qty: 30 ML | Refills: 3 | Status: SHIPPED | OUTPATIENT
Start: 2024-12-18

## 2024-12-18 RX ORDER — INSULIN GLARGINE 100 [IU]/ML
INJECTION, SOLUTION SUBCUTANEOUS
Qty: 45 ML | Refills: 3 | Status: SHIPPED | OUTPATIENT
Start: 2024-12-18

## 2024-12-18 NOTE — TELEPHONE ENCOUNTER
Pt LVM stating she wanted to let Dr Schmitt know that she accidentally took 20 units of Lantus this AM instead of the 18 units. She stated the nurse at the facility will be down to see her soon. Pt stated she weighed 159 lbs this AM, she says she lost 3 lbs. Pt wanted to let Dr Schmitt know that her sugar fasting this AM was 140 and 180 3 hrs after eating. Pt says she would like a prescription for Novolog pens sent in as she is having a hard time using the vial due to shakiness. Pt also stated Abilify and Rexulti have been discontinued. Lastly pt asked the next time she is in the hospital could Dr Johnson call and speak with someone regarding her testing her own sugars and following what Dr Schmitt has prescribed for her insulin because she has gotten her A1C down and while in the hospital last her sugars were in the 500's.

## 2024-12-18 NOTE — TELEPHONE ENCOUNTER
Yes, pt stated in previous call she will be taking the 20 units of Lantus as it is working to control sugars.  Left detailed message on pt's voicemail stating name. Callback number left.

## 2024-12-18 NOTE — TELEPHONE ENCOUNTER
Patient notified of message per Dr. Schmitt and voiced understanding of what was read to them.   Pt stated the 20 units is working better now and she will follow the scale provided by Dr Schmitt for her meal time insulin.

## 2024-12-18 NOTE — TELEPHONE ENCOUNTER
Please let her know I sent humalog (or the generic lispro) pens to [facility] as novolog is not currently covered under her plan. She will dose this insulin per the same scale as the novolog.  If she gets admitted to a hospital other than [facility] in the future, she will have to let me know and I can try to help manage her diabetes while she is there but since I don't have privileges at other hospitals, sometimes this is difficult.

## 2024-12-18 NOTE — TELEPHONE ENCOUNTER
Are you stating she plans on staying on 20 units and not decreasing to 18 units?  If so, this is fine with me but if she has 2 or readings under 80 over the next week, I would decrease to 18 units of lantus.

## 2024-12-19 ENCOUNTER — TELEPHONE (OUTPATIENT)
Age: 54
End: 2024-12-19

## 2024-12-19 DIAGNOSIS — E10.65 TYPE 1 DIABETES MELLITUS WITH HYPERGLYCEMIA (HCC): ICD-10-CM

## 2024-12-19 RX ORDER — BLOOD SUGAR DIAGNOSTIC
STRIP MISCELLANEOUS
Qty: 300 EACH | Refills: 0 | OUTPATIENT
Start: 2024-12-19

## 2024-12-19 RX ORDER — ACYCLOVIR 800 MG/1
TABLET ORAL
Qty: 6 EACH | Refills: 3 | Status: SHIPPED | OUTPATIENT
Start: 2024-12-19

## 2024-12-19 RX ORDER — PEN NEEDLE, DIABETIC 31 GX5/16"
NEEDLE, DISPOSABLE MISCELLANEOUS
Qty: 500 EACH | Refills: 3 | Status: SHIPPED | OUTPATIENT
Start: 2024-12-19

## 2024-12-19 RX ORDER — CALCIUM CITRATE/VITAMIN D3 200MG-6.25
TABLET ORAL
Qty: 900 EACH | Refills: 3 | Status: SHIPPED | OUTPATIENT
Start: 2024-12-19

## 2024-12-19 NOTE — TELEPHONE ENCOUNTER
Pt states she needs a refill on True Metrix test strips and she tests 10 times daily sent to [facility] on file. Pt states she thinks she may have an UTI.

## 2024-12-19 NOTE — TELEPHONE ENCOUNTER
Pt stated she did not eat anything late last night but she is in a lot of pain. Pt states she did take lantus 20 units this AM. Anette stated she ate an egg for breakfast, no toast or anything extra and before eating her glucose was 346 and it went up to 358 and they wouldn't give her correction insulin. Pt asked if Dr Schmitt could call [phone]. The physician currently is Dr Bateman.

## 2024-12-19 NOTE — TELEPHONE ENCOUNTER
I refilled the test strips.  If she feels she has a UTI, she will need to ask her PCP to see her or order a urinalysis and treat her as needed.  I can try to call the doctor at [facility] later today when I have some free time.

## 2024-12-19 NOTE — TELEPHONE ENCOUNTER
Contacted [facility] and received a call from Dr. Bateman.  She clarified that pt has been admitted into their hospital at home program and that's why nurses have been coming everyday the past few days to check on her and were concerned about her insulin dosing.  Pt will be discharged today from their program and then will be an outpatient and they have been giving her IV lasix to help with her weight gain and monitoring her kidney function.  She said pt will be able to administer her insulin going forward according to my plan that I discussed with her on Monday night, 12/17/24.

## 2024-12-19 NOTE — TELEPHONE ENCOUNTER
Patient notified of message per Dr. Schmitt and voiced understanding of what was read to them.   Pt states she will be having a urinalysis before today and will keep office posted.

## 2024-12-19 NOTE — TELEPHONE ENCOUNTER
I'm confused by this message, who is she asking me to call at [facility]?  It's fine that she took 4 units of insulin this morning.  Was there a reason her sugar was so high?  Did she eat something last night and not take any fast acting insulin or was she in more pain?  She should definitely take her 20 units of lantus now and if she is eating breakfast, she should take a dose based on the eating scale I gave her 2 days ago and if not eating, she can take another dose based on the not eating scale I gave her.

## 2024-12-19 NOTE — TELEPHONE ENCOUNTER
Anette [facility] stating this AM her glucose was 440, the nurse only wanted to give her 2 units but pt stated she injected 4 units. She stated now her sugar is 364 and they advised her not to take any more insulin. Pt states she would like for Dr Schmitt to call [facility] as she is not able to manage her blood sugars properly.

## 2024-12-26 ENCOUNTER — TELEPHONE (OUTPATIENT)
Age: 54
End: 2024-12-26

## 2024-12-26 RX ORDER — INSULIN GLARGINE 100 [IU]/ML
INJECTION, SOLUTION SUBCUTANEOUS
Qty: 45 ML | Refills: 3 | Status: SHIPPED | OUTPATIENT
Start: 2024-12-26

## 2024-12-26 NOTE — TELEPHONE ENCOUNTER
Pt LVM stating she had to increase her Lantus to 30 units due to being in pain. She says she was prescribed percocet and now has an appt scheduled with pain management next week. Pt says her sugars have been in the 330-340 fasting. Pt states every 4 hours she has to give herself insulin to manage sugars. Pt asked if she needs to increase Lantus more or adjust sliding scale.

## 2024-12-26 NOTE — TELEPHONE ENCOUNTER
Called and spoke with pt.  She clarified that she has been in more pain and is currently receiving percocet from her foot doctor, Dr. SANTIAGO, but has an appt with pain management at the end of January.  She clarified that she increased her lantus from 20 to 22 units (not 30 units as she inadvertently told Laurel) and has been taking 5 units of novolog every 2.5 hours over the past 12 hours and has been able to get her sugar to 150 at this time.  She has had some low sugars and has been drinking 2 boost drinks that each have 28g of carbs and this has led to high sugars and I told her this is too many carbs to correct for lows and only needs to drink 15-20g of carbs like juice, milk or regular soda to correct for a low sugar without going too high.  I advised her to stay on 22 units of lantus and keep using the scales from the letter I gave her last week and not take novolog more frequently than every 3 hours or this could lead to a low sugar. I advised her to keep her novolog scales the same for now and also clarified if her sugar is over 200 and she is eating she should just take from the eating scale and not also add the number from the not eating scale.  she voiced understanding of this plan.

## 2024-12-29 ENCOUNTER — TELEPHONE (OUTPATIENT)
Age: 54
End: 2024-12-29

## 2025-01-07 ENCOUNTER — TELEPHONE (OUTPATIENT)
Age: 55
End: 2025-01-07

## 2025-01-07 NOTE — TELEPHONE ENCOUNTER
Pt LVM stating her blood glucose has been running 397 and above for the past 3 days. She stated she is currently taking 22 units of Lantus and has taken an additional 5 units of short acting. Pt asked what should she do and does her sliding scale need adjustments?

## 2025-01-07 NOTE — TELEPHONE ENCOUNTER
Called and left a voicemail that she should take an additional 4 units of lantus now and starting tomorrow increase her lantus to 26 units daily and keep her novolog scale the same.  If she is still having high sugars over 200 later this week then she should call us back.    Laurel, can you call her and confirm she received my message?  Thanks!

## 2025-01-08 ENCOUNTER — TELEPHONE (OUTPATIENT)
Age: 55
End: 2025-01-08

## 2025-01-08 RX ORDER — INSULIN GLARGINE 100 [IU]/ML
INJECTION, SOLUTION SUBCUTANEOUS
Qty: 45 ML | Refills: 3 | Status: SHIPPED | OUTPATIENT
Start: 2025-01-08 | End: 2025-01-10 | Stop reason: DRUGHIGH

## 2025-01-08 RX ORDER — INSULIN GLARGINE 100 [IU]/ML
INJECTION, SOLUTION SUBCUTANEOUS
Qty: 45 ML | Refills: 3 | Status: SHIPPED | OUTPATIENT
Start: 2025-01-08 | End: 2025-01-08 | Stop reason: DRUGHIGH

## 2025-01-08 NOTE — TELEPHONE ENCOUNTER
Patient notified of message per Dr. Schmitt and voiced understanding of what was read to them.   Pt states the injection was not a steroid injection, it was toradol. She stated she will not use steroids while trying to lower A1C.

## 2025-01-08 NOTE — TELEPHONE ENCOUNTER
Patient notified of message per Dr. Schmitt and voiced understanding of what was read to them.   Pt stated her glucose is now 175 and she is doing better. She stated she will still increase to 30 units of Lantus tomorrow. Pt also stated she received an injection in her foot today.

## 2025-01-08 NOTE — TELEPHONE ENCOUNTER
Was the injection a steroid injection?  If so, then this will likely keep her sugar up over the next 24-48 hours and she should plan on taking an additional 4 units of lantus now and then tomorrow and the next day take 32 units and then go back to 30 units on Saturday.

## 2025-01-08 NOTE — TELEPHONE ENCOUNTER
Pt LVM stating her sugars are still 300 plus since 7 AM. Pt states she took the 28 units of Lantus and an additional 10 units of her fast acting and her sugars are not moving. She wanted to let Dr Schmitt know that she will be starting the 30 units of Lantus on tomorrow. Pt states Dr Schmitt can call her if needed.

## 2025-01-08 NOTE — TELEPHONE ENCOUNTER
Have her take an additional 2 units of lantus now and it's fine to increase to 30 units tomorrow.  She can take a now dose of 4 units of novolog to try and bring her sugars down under 200.

## 2025-01-08 NOTE — TELEPHONE ENCOUNTER
She called the on-call service at 7:56 this morning stating that her blood sugar this morning is 386.  I called and spoke with her and she confirmed she did receive my message yesterday and took an additional 4 units of lantus and despite this her fasting sugar was this high.  She has already taken 26 units of lantus this morning and 9 units of novolog per the scale.  She states she is still in a lot of pain that is driving up her sugars.  I advised her to increase her lantus to 28 units stating tomorrow and to take an additional 2 units of lantus now and keep her novolog scale the same.  She should see if her fasting sugars are staying under 200 by Friday and if so, then remain on 28 units of lantus but if still over 200, then she should further increase to 30 units.  she voiced understanding of this plan.

## 2025-01-10 ENCOUNTER — TELEPHONE (OUTPATIENT)
Age: 55
End: 2025-01-10

## 2025-01-10 RX ORDER — INSULIN GLARGINE 100 [IU]/ML
INJECTION, SOLUTION SUBCUTANEOUS
Qty: 45 ML | Refills: 3 | Status: SHIPPED | OUTPATIENT
Start: 2025-01-10

## 2025-01-10 NOTE — TELEPHONE ENCOUNTER
Pt LVM stating she just wanted to let Dr Schmitt know that she increased her Lantus to 34 units as her fasting sugar this AM was 335. Pt stated the cold weather is really bothering her knee and she was able to start percocet on yesterday. Pt states she did eat dinner after 6 PM yesterday and her sugar was 180 after eating. She says she will monitor her sugar for 2 days before increasing again to determine if she needs to increase.

## 2025-01-10 NOTE — TELEPHONE ENCOUNTER
Pt LVM stating the 34 units of Lantus is working great. Pt states her sugars have been in the 90's all day.

## 2025-01-10 NOTE — TELEPHONE ENCOUNTER
Pt LVM stating she took 8 units of fast acting, ate protein for lunch and her glucose is 126. Pt states she feeling good and will be contacting [employer] to get things started.

## 2025-01-15 ENCOUNTER — TELEPHONE (OUTPATIENT)
Age: 55
End: 2025-01-15

## 2025-01-15 NOTE — TELEPHONE ENCOUNTER
Pt paged this AM at 730 noting that she woke up with , she took 6 units of correction humalog and her sugar dropped to 45. She drank OJ and just finished eating a PB&J sandwich. She noted her sugar based on her FreeStyle Joanne was up to 55.   I instructed her to wait 15 minutes and if her sugar did not start to increase, to drink 4oz of OJ.    Pt voices understanding and agreement with the plan.      I will forward this message to Dr. Steven Schmitt.

## 2025-01-15 NOTE — TELEPHONE ENCOUNTER
Please ask her to clarify the past few days how much lantus she has been taking and how her sugars have been running otherwise as taking 6 units of humalog to correct for a sugar of 206 would have been too much according the scale I had given her previously.

## 2025-01-15 NOTE — TELEPHONE ENCOUNTER
Patient notified of message per Dr. Schmitt and voiced understanding of what was read to them.   Pt stated she is taking Lantus 34 units daily. She says her sugars have been awesome. Pt stated her glucose level is currently 102 after eating yogurt and a banana this AM after her low sugar of 45. Pt wanted to let Dr Schmitt know that her current 7 day average is 169 and the 30 day average is 145.

## 2025-01-17 ENCOUNTER — TELEPHONE (OUTPATIENT)
Age: 55
End: 2025-01-17

## 2025-01-17 NOTE — TELEPHONE ENCOUNTER
Pt LVM asking if Dr Schmitt could complete the order for Omnipod and Dexcom as she would like to start.

## 2025-01-18 NOTE — TELEPHONE ENCOUNTER
Please fax the completed form to [facility] and let her know to please call us when the pump and sensors have been shipped to her and we can set up training for her at the diabetes treatment center on the floor below us.

## 2025-01-20 ENCOUNTER — TELEPHONE (OUTPATIENT)
Age: 55
End: 2025-01-20

## 2025-01-20 NOTE — TELEPHONE ENCOUNTER
Form and note faxed to [facility], confirmation received. Patient notified of message per Dr. Schmitt and voiced understanding of what was read to them.      Statement Selected

## 2025-01-20 NOTE — TELEPHONE ENCOUNTER
PA initiated through covermymeds.com for  Dexcom G7     Dexcom G7  approved 01/01/2025 - 01/20/2026 PA Case: [id number]    PA initiated through [email or web address] for  Dexcom G7 sensors    Per covermymeds.[email or web address]:    Available without authorization. The member is able to fill the requested drug at the pharmacy. If coverage is still needed or requesting prior to the expiration of a current authorization, a request can be made by sending a fax or calling the number on the back of the member's ID card.     Pt notified via phone call.    Pt states her sugars have been doing well and wanted to let Dr Schmitt know she's trying.

## 2025-01-21 ENCOUNTER — TELEPHONE (OUTPATIENT)
Age: 55
End: 2025-01-21

## 2025-01-21 RX ORDER — INSULIN ASPART 100 [IU]/ML
INJECTION, SOLUTION INTRAVENOUS; SUBCUTANEOUS
Qty: 10 ML | Refills: 3 | Status: CANCELLED | OUTPATIENT
Start: 2025-01-21

## 2025-01-21 RX ORDER — INSULIN LISPRO 100 [IU]/ML
INJECTION, SOLUTION INTRAVENOUS; SUBCUTANEOUS
Qty: 20 ML | Refills: 11 | Status: SHIPPED | OUTPATIENT
Start: 2025-01-21

## 2025-01-21 NOTE — TELEPHONE ENCOUNTER
Pt LVM stating she checked her average on her CGM and it showed that her A1C is around 8.2%. pt asked if Dr Schmitt could place an order at [facility] to have A1C checked and she also asked that Dr Schmitt talk with her knee surgeon about having the surgery as soon as possible.

## 2025-01-21 NOTE — TELEPHONE ENCOUNTER
Patient notified of message per Dr. Schmitt and voiced understanding of what was read to them.   Pt stated she should be receiving her pump today and will contact office once she has it. Pt also stated she needs [facility].

## 2025-01-21 NOTE — TELEPHONE ENCOUNTER
Please remind her that she will need her A1c under 8% to have surgery and to get to this goal, she will need a few more weeks of tighter control and should plan on keeping her appointment as scheduled with me on 2/18/25 at 9:30am [facility] using the order that is on file under my name.

## 2025-01-22 NOTE — TELEPHONE ENCOUNTER
Please let her know [facility] as these are covered and novolog is not.  Have her be in touch with me when she has her pump and dexcom and we can arrange training.

## 2025-01-24 ENCOUNTER — TELEPHONE (OUTPATIENT)
Age: 55
End: 2025-01-24

## 2025-01-24 DIAGNOSIS — E10.65 TYPE 1 DIABETES MELLITUS WITH HYPERGLYCEMIA (HCC): Primary | ICD-10-CM

## 2025-01-24 NOTE — TELEPHONE ENCOUNTER
Pt LVM stating she has received her Omnipod pump and Dexcom. Pt asked for the number to schedule training. Pt also wanted to let Dr Easley know that her 7 day average of blood sugars is 151, 14 days 156, 30 days 185 and 90 days 190.

## 2025-01-24 NOTE — TELEPHONE ENCOUNTER
I put in the referral for pump training to the program for diabetes health and someone should be contacting you about scheduling a visit but if you haven't heard from them in the next few days about setting up an appointment, you can call [phone] to schedule this for their earliest availability.  Thanks.

## 2025-01-27 ENCOUNTER — TELEPHONE (OUTPATIENT)
Age: 55
End: 2025-01-27

## 2025-01-27 NOTE — TELEPHONE ENCOUNTER
Pt LVM asking Dr Schmitt to not be alarmed by her low glucose of 67 earlier today on her CGM. She also wanted to let him know that she will increase her non eating corrective scale by 1 unit to help maintain her glucose.

## 2025-01-27 NOTE — TELEPHONE ENCOUNTER
Please have her decrease her novolog scale by 1 unit across the board from her current dose to see if this helps.

## 2025-01-27 NOTE — TELEPHONE ENCOUNTER
Pt LVM stating her blood sugars are dropping after she eats a meal. She stated that after eating her glucose drops between 30-50's. Pt feels her meal time insulin should be lowered by at least one unit and her correction may need to be increased. Pt stated she calculated her A1C and it should be 8%. Pt stated she will having her labs drawn today.

## 2025-01-27 NOTE — TELEPHONE ENCOUNTER
Pt left voice message stating she received instructions and that she is scheduled for pump training on 02/06/2025 with Pretty at a [place] location.

## 2025-01-28 LAB
BUN SERPL-MCNC: 27 MG/DL (ref 6–24)
BUN/CREAT SERPL: 22 (ref 9–23)
CALCIUM SERPL-MCNC: 9.1 MG/DL (ref 8.7–10.2)
CHLORIDE SERPL-SCNC: 105 MMOL/L (ref 96–106)
CO2 SERPL-SCNC: 21 MMOL/L (ref 20–29)
CREAT SERPL-MCNC: 1.21 MG/DL (ref 0.57–1)
EGFRCR SERPLBLD CKD-EPI 2021: 54 ML/MIN/1.73
GLUCOSE SERPL-MCNC: 73 MG/DL (ref 70–99)
POTASSIUM SERPL-SCNC: 4 MMOL/L (ref 3.5–5.2)
REPORT: NORMAL
SODIUM SERPL-SCNC: 139 MMOL/L (ref 134–144)
T4 FREE SERPL-MCNC: 1.39 NG/DL (ref 0.82–1.77)
TSH SERPL DL<=0.005 MIU/L-ACNC: 0.73 UIU/ML (ref 0.45–4.5)

## 2025-01-29 ENCOUNTER — TELEPHONE (OUTPATIENT)
Age: 55
End: 2025-01-29

## 2025-01-29 NOTE — TELEPHONE ENCOUNTER
9:35 AM  I spoke with patient about up coming pump start appointment on 2/6/2025, this will be a pump assessment appointment.  She agreed to coming in to have dexterity skills (drawing up insulin from a vial, manipulating and applying the pod, using controller) and carb counting skills evaluated. She has not completed the insulet eLearning modules or registered her devices with [employer] at this this time. She does not have connectivity to wifi/internet throughout her home. She was not aware that she could not use the Dexcom G7 controller and the Omnipod 5 controller.      Ms. Cannon was on the way to a doctor's appointment, I will call her back at 3 PM today.

## 2025-01-30 ENCOUNTER — CLINICAL DOCUMENTATION (OUTPATIENT)
Age: 55
End: 2025-01-30

## 2025-01-30 NOTE — PROGRESS NOTES
[facility]  Diabetes Self-Management Education & Support Program  Insulin Pump Intake Note    SUMMARY    Spoke with Anette Cannon today regarding insulin pump training.     Insulin pump brand: Omnipod 5.  Has NOT DONE onboarding.  States she will do onboarding Monday 2/3/2025 after switching to new cell carrier.    After assessment, patient will require additional DSMES training prior to starting insulin pump therapy.  They are scheduled to start training on 2/6/2025 with certified insulin pump  STEVE Sanchez, RN, [facility], at [employer] .           When was the last time patient received DSMES? greater than 10 years with [employer] pump start    Is patient currently...  Counting carbohydrates? Yes    Dosing by MDI using set mealtime doses? Yes    Using an insulin to carbohydrate ratio to dose insulin at mealtime - sliding scale     Using a sensitivity factor to correct out-of-range blood glucoses - sliding scale      Has patient used an insulin pump in the past? Yes    Is patient currently wearing an insulin pump? No   If yes, is patient having site issues? No        Does patient have the new insulin pump in their possession? Yes    Is patient currently using CGMS? Using Freestyle Joanne 3, will switch to Dexcom G7    Is new insulin pump compatible with current CGM sensor? Yes    Has patient verified their phone is compatible with both the insulin pump & CGMS? Yes, Android G5, switching to [employer] to have reliable connectivity at home, does not have this with current carrier [employer]    Does patient have insulin in vials? Yes      Patient instructed to bring the following to their appointment:  Insulin pump box and all information from pump company, Backup CGM sensor and any controllers or receivers , Read user's manual before appointment, Charge device. 2/6/2025 appointment will be a pre-pump start assessment, I will send a list of items to bring to the patient via [employer].

## 2025-01-30 NOTE — TELEPHONE ENCOUNTER
Pt LVM stating she needs syringes to use to inject and fill insulin pump once started. Pt stated she will use syringes up to 8 times daily.

## 2025-01-30 NOTE — PROGRESS NOTES
10:56 AM  Spoke with patient regarding upcoming pre-pump assessment appointment. See encounter note for details.

## 2025-01-30 NOTE — TELEPHONE ENCOUNTER
----- Message from ADELIA DANG RN sent at 1/30/2025 10:12 AM EST -----  Regarding: Syringes/Pt refill request  Hi Dr. Schmitt,    I spoke with Ms. Cannon yesterday and we discussed that she does not need syringes for her insulin pump. I will be speaking with her again today and will reiterate this. I am working on getting her scheduled for a pre-pump assessment to see if she has the dexterity skills and understanding to move forward with the Omnipod 5 pump start. I'll keep you updated.    Thank you,  Tereza Thomas   no

## 2025-02-03 DIAGNOSIS — E10.65 TYPE 1 DIABETES MELLITUS WITH HYPERGLYCEMIA (HCC): ICD-10-CM

## 2025-02-03 RX ORDER — PEN NEEDLE, DIABETIC 31 GX5/16"
NEEDLE, DISPOSABLE MISCELLANEOUS
Qty: 400 EACH | Refills: 3 | Status: SHIPPED | OUTPATIENT
Start: 2025-02-03

## 2025-02-03 NOTE — TELEPHONE ENCOUNTER
Pt LVM stating she needs pen needles sent to [facility] on file. She also mentioned she has now switched to [employer] to accomodate her Omnipod.

## 2025-02-04 DIAGNOSIS — E03.9 ACQUIRED HYPOTHYROIDISM: ICD-10-CM

## 2025-02-04 DIAGNOSIS — R79.89 ELEVATED LFTS: ICD-10-CM

## 2025-02-04 DIAGNOSIS — I10 ESSENTIAL (PRIMARY) HYPERTENSION: ICD-10-CM

## 2025-02-04 DIAGNOSIS — E78.2 MIXED HYPERLIPIDEMIA: ICD-10-CM

## 2025-02-04 DIAGNOSIS — E10.65 TYPE 1 DIABETES MELLITUS WITH HYPERGLYCEMIA (HCC): ICD-10-CM

## 2025-02-05 ENCOUNTER — TELEPHONE (OUTPATIENT)
Age: 55
End: 2025-02-05

## 2025-02-05 LAB — HBA1C MFR BLD: 9 % (ref 4.8–5.6)

## 2025-02-05 NOTE — TELEPHONE ENCOUNTER
Angelica I was informed by the PSR that insurance has changed for this patient.  The appointment was listed as Botox but the description says something else.    Not sure if you need this but just incase     New insurance is a [employer] dual plan

## 2025-02-05 NOTE — TELEPHONE ENCOUNTER
HIPAA Verified (if caller is someone other than patient): no       Reason for Call:     Reschedule apptmt for today     Details from Caller:  Patient is in another apptmt today and would not be able to make her apptmt for biopsy      Message: (any additional details from patient/caller not covered above)  She is requesting for a call back to [phone]

## 2025-02-06 ENCOUNTER — TELEPHONE (OUTPATIENT)
Age: 55
End: 2025-02-06

## 2025-02-06 LAB
ALBUMIN/CREAT UR: <24 MG/G CREAT (ref 0–29)
CHOLEST SERPL-MCNC: 113 MG/DL (ref 100–199)
CREAT UR-MCNC: 12.6 MG/DL
FRUCTOSAMINE SERPL-SCNC: 352 UMOL/L (ref 0–285)
HDLC SERPL-MCNC: 48 MG/DL
IMP & REVIEW OF LAB RESULTS: NORMAL
LDLC SERPL CALC-MCNC: 39 MG/DL (ref 0–99)
Lab: NORMAL
MICROALBUMIN UR-MCNC: <3 UG/ML
TRIGL SERPL-MCNC: 153 MG/DL (ref 0–149)
VLDLC SERPL CALC-MCNC: 26 MG/DL (ref 5–40)

## 2025-02-06 NOTE — TELEPHONE ENCOUNTER
I just e-mailed the order form.  It will come from an e-mail [email or web address] and all the e-mail will have is just an attached image with the PDF.    Please confirm you received this and check your junk mail just in case you don't see it in your regular e-mail.  Thanks.  ----- Message -----  From: Steven Schmitt MD  Sent: 2/3/2025   1:16 PM EST  To: Steven Schmitt MD  Subject: omnipod orders for pt to Tereza Thomas

## 2025-02-07 ENCOUNTER — TELEPHONE (OUTPATIENT)
Age: 55
End: 2025-02-07

## 2025-02-07 NOTE — TELEPHONE ENCOUNTER
Pt LVM stating she has new insurance and needs a new PA for Omnipod and Dexcom G7 sensors.    PA initiated through covermymeds.com for  Omnipod 5 [id number] Pods Gen 5     Omnipod 5 [id number] Pods Gen 5 approved 02/01/2025 - 02/07/2026 PA #: [id number]    PA initiated through covermymeds.com for  Dexcom G7 sensors    Message per covermymeds.com:    \"The member recently filled this medication and will be able to return for their next refill according to their plan limits. If dosage has changed, contact the pharmacy to submit the appropriate submission clarification codes\"    Pt notified via phone call. Pt stated she is upset her A1C is still 9.0 because her sensor showed other wise. But she has training scheduled.

## 2025-02-09 NOTE — ED NOTES
Patient discharge by Nancy Hughes MD - pt sent to the front lobby, with strong and steady gait -  Discharge information / home RX / and reasons to return to the ED were reviewed by the doctor. Patient was seen by [facility] 

## 2025-02-12 ENCOUNTER — TELEPHONE (OUTPATIENT)
Age: 55
End: 2025-02-12

## 2025-02-12 NOTE — TELEPHONE ENCOUNTER
Patient sent this message to Madina however Madina has not seen patient yet and requested I send to you     Tell Servando to increase my cargobovodopa because I'm still shaking on the left side my lip trembles too [facility] I don't use [phone] [facility]  Thanks   Anette

## 2025-02-12 NOTE — TELEPHONE ENCOUNTER
Left message on Identified VM to call office at [phone]  Advised B  is not going to increase her carb/levo without seeing her. Advised we would need to get her in for a visit. Per B  may double book for a 15 minute appt

## 2025-02-18 ENCOUNTER — OFFICE VISIT (OUTPATIENT)
Age: 55
End: 2025-02-18
Payer: MEDICARE

## 2025-02-18 VITALS
SYSTOLIC BLOOD PRESSURE: 144 MMHG | HEART RATE: 80 BPM | DIASTOLIC BLOOD PRESSURE: 74 MMHG | WEIGHT: 183.2 LBS | HEIGHT: 65 IN | BODY MASS INDEX: 30.52 KG/M2

## 2025-02-18 DIAGNOSIS — I10 ESSENTIAL (PRIMARY) HYPERTENSION: ICD-10-CM

## 2025-02-18 DIAGNOSIS — R79.89 ELEVATED LFTS: ICD-10-CM

## 2025-02-18 DIAGNOSIS — E78.2 MIXED HYPERLIPIDEMIA: ICD-10-CM

## 2025-02-18 DIAGNOSIS — E10.65 TYPE 1 DIABETES MELLITUS WITH HYPERGLYCEMIA (HCC): Primary | ICD-10-CM

## 2025-02-18 DIAGNOSIS — E03.9 ACQUIRED HYPOTHYROIDISM: ICD-10-CM

## 2025-02-18 PROCEDURE — G0108 DIAB MANAGE TRN  PER INDIV: HCPCS

## 2025-02-18 PROCEDURE — 3078F DIAST BP <80 MM HG: CPT | Performed by: INTERNAL MEDICINE

## 2025-02-18 PROCEDURE — 3052F HG A1C>EQUAL 8.0%<EQUAL 9.0%: CPT | Performed by: INTERNAL MEDICINE

## 2025-02-18 PROCEDURE — 99214 OFFICE O/P EST MOD 30 MIN: CPT | Performed by: INTERNAL MEDICINE

## 2025-02-18 PROCEDURE — 3077F SYST BP >= 140 MM HG: CPT | Performed by: INTERNAL MEDICINE

## 2025-02-18 PROCEDURE — G2211 COMPLEX E/M VISIT ADD ON: HCPCS | Performed by: INTERNAL MEDICINE

## 2025-02-18 NOTE — PATIENT INSTRUCTIONS
1) Your TSH (thyroid test) is perfect so I will keep your dose the same of levothyroxine.    2) Your Hemoglobin A1c (3 month test of blood sugar) is 9% and we'll work to get this under 8% to get your surgeries.    3) Your creatinine (kidney test) has improved to 1.2 and your urine protein is normal and your cholesterol is normal.    4) Once you are up and running on the pump, let me know if you need anything before your visit next month.

## 2025-02-18 NOTE — PROGRESS NOTES
Chief Complaint   Patient presents with    Diabetes     PCP and pharmacy confirmed  Pt consented to use of LELIA     History of Present Illness: Anette Cannon is a 54 y.o. female here for follow up of diabetes.  Weight up 27 lbs since last visit in 11/24.  Review of her joanne data over the past 90 days shows 54% in range, 43% high, and 3% low.    she has the following indications to conting treatment with Freestyle Joanne:  1) she has type 1 diabetes (E10.65) and is on an intensive insulin regimen with 4 injections per day  2) she tests her blood sugar 10 times per day and makes treatment decisions off her blood sugar readings and off freestyle joanne sensor readings  3) she requires frequent adjustments to her insulin injection doses based on her freestyle joanne sensor readings  4) she has benefitted from therapeutic continuous glucose monitoring and I recommend that she continue this  5) she is seen in my office every 1-3 months                History of Present Illness  The patient is a 54-year-old female here for a follow-up of diabetes.    She has been adhering to her medication regimen, which includes humalog taken three times daily. She has completed all necessary online training for her upcoming appointment with Tereza rivas today for her omnipod start. She is currently on Lantus and Humalog pens, with a recent reduction in her Lantus dosage from 35 to 30 units due to hypoglycemic episodes. She reports that her blood glucose levels have not exceeded 300 recently. Her average glucose level over the past 7 days was 150, and over the past 14 days, it was 166. She has experienced a few postprandial hypoglycemic episodes. She has been managing her pain with Percocet and has expressed a preference for this medication over morphine or Dilaudid. She has a history of endometriosis dating back to when she was 20 years old. She reports a decrease in the frequency of falls and excessive sleepiness.    She takes

## 2025-02-18 NOTE — PROGRESS NOTES
[facility]  Diabetes Self-Management Education & Support Program  Encounter Note      SUMMARY  Diabetes self-care management training was completed related to pre-pump start skills assessment.        EVALUATION:  Ms. Cannon was able to successfully demonstrate drawing up insulin and filling the pod independently. With some assistance, she was able to place the pod appropriately on her body relative to her CGM sensor. She was able to count carbs accurately and describe her current insulin regimen. I believe she has the skill set necessary to use the Omnipod 5 AID system.     We reviewed what she needs to complete for the Omnipod e-learning, she will complete that this week. We linked her devices through [employer], which will trigger Omnipod to move her into the [employer] Insulet portal, we will schedule the pump start when this occurs. Unfortunately, her new phone is not compatible with the Omnipod 5 mehnaz, she was provided a list of compatible phones to take with her to [employer] to select a new phone.        Next provider visit is scheduled for - to be determined.         ANDREAS MADRIGAL RN on 2/18/2025 at 2:36 PM    I have personally reviewed the health record, including provider notes, laboratory data and current medications before making these care and education recommendations. The time spent in this effort is included in the total time.  Total minutes: 90

## 2025-02-26 NOTE — DIABETES MGMT
[facility] [address]  DIABETES MANAGEMENT CONSULT    Consulted by  SANTANA Vega  for advanced nursing evaluation and care for inpatient blood glucose management. Evaluation and Action Plan   Annie Cabrera is a 47year old female, with Type 1 Diabetes, who was admitted with severe hypoglycemia. She reports that she has been dosing her basal insulin at night but will intermittently forget this dose. She did forget her 7/9 evening dose and took 26 units glargine the following morning 7/10. Per routine she then took her evening 36 units glargine that night- since glargine has a duration of action of 24 hours, the total basal insulin on board that evening was about 54 units resulting in fasting hypoglycemia. Hypoglycemia has resolved as glargine worn off. BG now elevated and basal/bolus/correctional humalog should resume. Management Rationale Action Plan   Medication   Basal needs Type 1 Diabetes Lantus 30 units daily (this is a 20% dose reduction from PTA dose). If her fasting BG is elevated over 180mg/dl, resume home dosing of 36 units daily. Nutritional needs Using moderate sensitivity She is reporting an improved appetite but still below her baseline. Will therefore start with 6 units humalog/meal (1:10 CHO ratio). If pre-prandial hyperglycemia persists, can resume 10 units humalog/meals (home dose)     Corrective insulin Medium sensitivity ACHS   Additional orders  Continue carb consistent meals       Diabetes Discharge Plan   Medication  TBD   Additional orders  Smoking cessation   Will need a FUV with endocrinologist per routine following hospital discharge for ongoing diabetes management           Initial Presentation   Lupe Arevalo is a 47 y.o. female who presented to the ED 7/11/23 with hypoglycemia. Pt checked her BS at home and her sugar was 42. She drank a half a gallon of orange juice ad crawled outside. When EMS arrived her sugar was 107.
Follows with pulmonology    Home [address]   In pulmonary rehab   Current medication: Treprostinil 32mcg QID     
--   --   --  4.0   CL 82*  --  87*  --   --   --  88*   CO2 34*  --  33*  --   --   --  30   PHOS 4.6  --   --   --   --   --   --    BUN 19  --  16  --   --   --  16   CREATININE 1.26*  --  1.02  --   --   --  1.10*    < > = values in this interval not displayed. Lab Results   Component Value Date    ALT 22 2023    AST 26 2023    ALKPHOS 84 2023    BILITOT 0.6 2023     Lab Results   Component Value Date    TSH 3.130 2023     Lab Results   Component Value Date    LABA1C 7.9 (H) 2023    LABA1C 8.3 (H) 2023    LABA1C 9.3 (H) 2023       Factors impacting BG management  Factor Dose Comments   Nutrition:  Standard meals     60 grams/meal      Anemia of chronic disease    A1cs inaccurate     Chronic Pain     Infection UTI  IV antibiotics     Endometriosis  Insulin resistance    CHF Without exacerbation     Other:   Kidney function CKD, PERLA resolved. Blood glucose pattern      Significant diabetes-related events over the past 24-72 hours  A1C 7.9%  Fasting B  Pre-prandial: 1218-453 Up and eating!   Basal: Glargine 30 units daily  Bolus: 6 units Humalog/meal  Correction: 9 units in the last 24h  Fluid restriction   Na 126      Assessment and Nursing Intervention   Nursing Diagnosis Risk for unstable blood glucose pattern   Nursing Intervention Domain 5250 Decision-making Support   Nursing Interventions Examined current inpatient diabetes/blood glucose control   Explored factors facilitating and impeding inpatient management  Explored corrective strategies with patient and responsible inpatient provider   Informed patient of rational for insulin strategy while hospitalized     Nursing Diagnosis 94229 Ineffective Health Management   Nursing Intervention Domain 5256 Decision-making Support   Nursing Interventions Identified diabetes self-management practices impeding diabetes control  Discussed diabetes survival skills related to  Healthy Plate eating plan;

## 2025-02-27 ENCOUNTER — OFFICE VISIT (OUTPATIENT)
Age: 55
End: 2025-02-27

## 2025-02-27 DIAGNOSIS — E10.65 TYPE 1 DIABETES MELLITUS WITH HYPERGLYCEMIA (HCC): Primary | ICD-10-CM

## 2025-02-27 NOTE — PROGRESS NOTES
[facility]  Diabetes Self-Management Education & Support Program  Encounter Note      SUMMARY  Diabetes self-care management training was completed related to Omnipod pump start prep.     EVALUATION:  Anette has completed all on boarding/e-learning through Omnipod, all devices have been registered with [employer], she has a new smart phone that supports the Omniopod mehnaz and Dexcom G7 mehnaz and will provide reliable connectivity.  Today we covered how to use the Dexcom G7 CGM, Anette demonstrated the ability to navigate the mehnaz to start and pair the sensor. The sensor was applied and in the warm up phase when she left the office.      She has a list of supplies to bring to the pump start appointment on 3/12/2025 and instructions for insulin the day before and day of the pump start provided by Dr. Schmitt on Omnipod 5 pump start order form.      A pump follow up appointment is scheduled for 3/14/2025, she will do her first pod change in the office with support.      Dexcom low alert set to 100, high alert set to 250, dropping and rising fast alerts set to on.      She expressed understanding of all information and instructions.     Anette's  Duke Muniz was present and expressed the same understanding.      RECOMMENDATIONS:  Reach out to [employer] to get replacements for the 2 dysfunctional CGM sensors brought in today.     Next provider visit is scheduled for   Future Appointments         Provider Specialty Dept Phone    3/12/2025 9:00 AM Esther Thomas RN Diabetes Services [phone]    3/14/2025 2:30 PM Esther Thomas RN Diabetes Services [phone]    3/18/2025 3:50 PM Francisco Palafox MD Orthopedic Surgery [phone]    3/25/2025 8:50 AM Steven Schmitt MD Endocrinology [phone]    5/7/2025 1:30 PM Madina Angeles APRN - NP Neurology [phone]    7/28/2025 11:00 AM (Arrive by 10:45 AM) Rossana Treviño ANP Neurology [phone]

## 2025-02-28 ENCOUNTER — TELEPHONE (OUTPATIENT)
Age: 55
End: 2025-02-28

## 2025-02-28 NOTE — TELEPHONE ENCOUNTER
Pt LVM stating she may need her sensitivity changed. She stated she hasn't started the pump yet but is wearing the G7 and it is working well. She says this AM her fasting glucose was 280, it then dropped to 83. She says she ate breakfast and drank juice and waited to check again and glucose was at 112. She says she took 30 units of long acting and 6 units of her short acting this AM. She stated she tweaked the insulin dose due to gaining 20 lbs but she may need to go back to the old scale provided by Dr Schmitt. She also wanted to let Dr Schmitt know that she has been approved for pump training and that her knee pain is not as bad due to the warmer weather.

## 2025-03-02 RX ORDER — METOCLOPRAMIDE 5 MG/1
5 TABLET ORAL 2 TIMES DAILY
Qty: 180 TABLET | Refills: 3 | Status: SHIPPED | OUTPATIENT
Start: 2025-03-02

## 2025-03-03 RX ORDER — DULOXETIN HYDROCHLORIDE 20 MG/1
20 CAPSULE, DELAYED RELEASE ORAL DAILY
Qty: 90 CAPSULE | Refills: 3 | Status: SHIPPED | OUTPATIENT
Start: 2025-03-03 | End: 2025-03-05

## 2025-03-03 NOTE — TELEPHONE ENCOUNTER
Sent her the following message through [employer]:    I approved your prescription for duloxetine (cymbalta) since your psychiatrist can no longer take your insurance.

## 2025-03-03 NOTE — TELEPHONE ENCOUNTER
Pt LVM stating her psychiatrist can no longer write a prescription for duloxetine because he does not accept her insurance. Pt asked if Dr Schmitt could send the RX until she finds a new psychiatrist.

## 2025-03-04 RX ORDER — INSULIN GLARGINE 100 [IU]/ML
INJECTION, SOLUTION SUBCUTANEOUS
Qty: 45 ML | Refills: 3 | Status: SHIPPED | OUTPATIENT
Start: 2025-03-04 | End: 2025-03-06 | Stop reason: DRUGHIGH

## 2025-03-05 ENCOUNTER — TELEPHONE (OUTPATIENT)
Age: 55
End: 2025-03-05

## 2025-03-05 RX ORDER — DULOXETIN HYDROCHLORIDE 20 MG/1
CAPSULE, DELAYED RELEASE ORAL
Qty: 90 CAPSULE | Refills: 1 | Status: SHIPPED | OUTPATIENT
Start: 2025-03-05

## 2025-03-05 NOTE — TELEPHONE ENCOUNTER
Pt called stating her glucose was 348 fasting so she took the 32 units of long acting and then took 12 units of short acting to correct. She said she waited to eat, ate a yogurt and added granola. Pt says her glucose dropped to 49. She stated she continued to eat yogurt, granola and drank juice. She stated she asked her significant other to give her the glucagon and he did. Pt says now her glucose is 221 and steady she asked if she should take 1 unit for correction.

## 2025-03-05 NOTE — TELEPHONE ENCOUNTER
Patient notified of message per Dr. Schmitt and voiced understanding of what was read to them.   Pt stated she didn't take any more correction and is now eating and counted her carbs.

## 2025-03-06 ENCOUNTER — TELEPHONE (OUTPATIENT)
Age: 55
End: 2025-03-06

## 2025-03-06 RX ORDER — INSULIN GLARGINE 100 [IU]/ML
INJECTION, SOLUTION SUBCUTANEOUS
Qty: 45 ML | Refills: 3 | Status: SHIPPED | OUTPATIENT
Start: 2025-03-06 | End: 2025-03-09 | Stop reason: DRUGHIGH

## 2025-03-06 NOTE — TELEPHONE ENCOUNTER
3:22 PM  I spoke with Anette and walked her through how to share her Dexcom glucose readings with her significant other Duke.  Also reviewed change to her MDI routine day of her upcoming pump start. She expressed understanding of all topics.

## 2025-03-09 RX ORDER — INSULIN GLARGINE 100 [IU]/ML
INJECTION, SOLUTION SUBCUTANEOUS
Qty: 45 ML | Refills: 3 | Status: SHIPPED | OUTPATIENT
Start: 2025-03-09

## 2025-03-10 ENCOUNTER — TELEPHONE (OUTPATIENT)
Age: 55
End: 2025-03-10

## 2025-03-10 RX ORDER — INSULIN LISPRO 100 [IU]/ML
INJECTION, SOLUTION INTRAVENOUS; SUBCUTANEOUS
Qty: 20 ML | Refills: 11 | Status: SHIPPED | OUTPATIENT
Start: 2025-03-10

## 2025-03-10 NOTE — TELEPHONE ENCOUNTER
Received a voicemail from patient stating that [facility] has been trying to fill novolog and this is not covered.  She has previously picked up humalog vials for her pump and asked that I send a message to [facility] to let them know to only fill humalog so I resent a prescription for humalog vials to [facility] and wrote to NOT dispense novolog.

## 2025-03-10 NOTE — TELEPHONE ENCOUNTER
Spoke with ms Anette this morning, she called roxana at approximately 6 AM and indicated her blood sugar was 453 and that she took 14 units of humalog 2 hours ago, when she rechecked her blood sugar with fingerstick  was 123 (her dexcom read in the 300s as it was dropping) and was asking if she should consume juice given her rapid drop, advised to take 4 to 6 oz of juice in anticipation of low blood sugar given +300 drop in 2 hours, she consumed the juice and rechecked with fingerstick in 10 minutes and her blood sugar is back to 468, she took another 10 units of humalog before discussing with me and advised to continue to monitor her blood sugar every 15 minutes with a fingerstick and question of blood sugar 123 if it was a false low reading, patient indicates she will continue to monitor and call us back if any further issues with controlling her blood sugar, she also will reach out to dr Schmitt her primary endocrinologist

## 2025-03-11 ENCOUNTER — TELEPHONE (OUTPATIENT)
Age: 55
End: 2025-03-11

## 2025-03-11 NOTE — TELEPHONE ENCOUNTER
Hopefully with going on the pump tomorrow, this will help with the severe fluctuations in her sugar she is experiencing. When you have a sugar under 80, try not to overcorrect and plan on using 15 grams of carbs = 4 oz of juice or milk or regular soda or sweet tea and give this 10-15 minutes and if still not over 80, then repeat.   Do not consume solid carbs as these take too long to bring your sugar up and stick to liquid carbs.

## 2025-03-11 NOTE — TELEPHONE ENCOUNTER
Pt called stating her blood sugar is dropping. Pt states she had a high sugar so she corrected with 1 unit and gave herself 2 units before lunch. Pt stated she had a tuna sandwich which was 30 grams of carbs. She stated her glucose dropped to 80 so she drank some juice and ate some trail mix. Pt stated shortly after her glucose then dropped to 67, so she says she ate a cupcake and a piece of carrot cake. After eating that she says her Dexcom informed her she'll be dropping to 55 in 10 min. Pt asked what should she do, eat more?

## 2025-03-11 NOTE — TELEPHONE ENCOUNTER
Patient notified of message per Dr. Schmitt and voiced understanding of what was read to them.   She stated she read the message and will follow the instructions for lows per Dr Schmitt. She also stated her sugar is 221 currently and asked should she correct with 3 units. Informed pt per Dr Schmitt to take 2 units to be safe. Pt verbalized understanding.

## 2025-03-12 ENCOUNTER — OFFICE VISIT (OUTPATIENT)
Age: 55
End: 2025-03-12

## 2025-03-12 DIAGNOSIS — E10.65 TYPE 1 DIABETES MELLITUS WITH HYPERGLYCEMIA (HCC): Primary | ICD-10-CM

## 2025-03-12 NOTE — PROGRESS NOTES
[facility]  Insulin Pump Training Encounter Note        SUMMARY  Insulin pump training was completed today on Omnipod 5  per pump company training checklist.     EVALUATION:  Anette Cannon demonstrated understanding of pump modes and features, algorithm goals, responses to alerts, pod changes, troubleshooting sensor issues, hypoglycemia treatment, and realistic expectations for pump therapy.    Anette did great this morning and well prepared. Her support person Duke Muniz was present for the entirety of the pump start appointment. Both were very engaged and asked great questions.     Anette will return on Friday, 3/14/2025 to complete her first pod change with support. She has the [employer] customer service number should she need support before then.  She will contact Dr. Schmitt if she has concerns about settings, however she does understand what to expect as far as giving the AID system time to learn/adapt to her glucose control needs.       RECOMMENDATIONS:  Bolus for all meals and snacks.  Bolus for meals 15-20 minutes before eating to avoid hypoglycemia.   Treat hypoglycemia with 1/2 normal dose or 8 g carb when using pump.  Contact customer care for pump and sensor issues, as needed.       Next provider visit is scheduled for   Future Appointments         Provider Specialty Dept Phone    3/14/2025 2:30 PM Esther Thomas RN Diabetes Services [phone]    3/18/2025 3:50 PM Francisco Palafox MD Orthopedic Surgery [phone]    3/25/2025 8:50 AM Steven Schmitt MD Endocrinology [phone]    5/7/2025 1:30 PM Madina Angeles APRN - NP Neurology [phone]    7/28/2025 11:00 AM (Arrive by 10:45 AM) Rossana Treviño ANP Neurology [phone]                      DATE CGM/SENSOR TOPICS EVALUATION     3/12/2025    Value of blood glucose monitoring   Realistic expectations   Differences between sensor and blood glucose values.  Setting alerts on sensor for

## 2025-03-13 ENCOUNTER — TELEPHONE (OUTPATIENT)
Age: 55
End: 2025-03-13

## 2025-03-13 NOTE — TELEPHONE ENCOUNTER
Pt LVM stating she is on the pump and is having blood sugars no higher that 168. Pt stated she is steady between 100-120, feeling normal, and has energy. She also stated she occasionally experience brain fog . Pt would like to know how long would it take for her body to adjust to having normal blood sugar readings.

## 2025-03-13 NOTE — TELEPHONE ENCOUNTER
I returned Ms. Cannon's call and spoke to her about her glucose management since starting the Omniopod 5 pump yesterday, she is thrilled with the increase in control. She does report feeling \"foggy headed\" with in target glucose readings, we discussed this is not uncommon and should improve over the next few days to a week or so. She expressed understanding. She will be seen in the office tomorrow for her first pod change.

## 2025-03-13 NOTE — TELEPHONE ENCOUNTER
Sent her the following message through [employer]:    I received a message you called about how long it will take for your body to adjust to the normal blood sugars and most patients tend to feel better after 7-10 days of getting used to normal blood sugars.  Thanks!

## 2025-03-14 ENCOUNTER — OFFICE VISIT (OUTPATIENT)
Age: 55
End: 2025-03-14

## 2025-03-14 DIAGNOSIS — E10.65 TYPE 1 DIABETES MELLITUS WITH HYPERGLYCEMIA (HCC): Primary | ICD-10-CM

## 2025-03-14 NOTE — PROGRESS NOTES
[facility]  Insulin Pump Training Encounter Note        SUMMARY  Omnipod 5 AID follow up and pod change appointment.     EVALUATION:  Anette Cannon and partner Duke Muniz successfully demonstrated deactivating a pod, filling, pairing, applying, and starting a new pod in the office today. Ms. Cannon's total insulin use since initial pod placement on 3/13/2025 was 20.8 units, glucose was in range 80%, no lows. We reviewed using the History Details information and I reminded her to always select \"Use Sensor\" rather than manually entering glucose data when bolusing.  Overall, Ms. Cannon is thrilled with the pump. She will reach out to me, [employer] customer service, and  if she needs additional support.       RECOMMENDATIONS:  Bolus for all meals and snacks.  Bolus for meals 15-20 minutes before eating to avoid hypoglycemia.  Treat hypoglycemia with 1/2 normal dose or 8 g carb when using pump.  Contact customer care for pump and sensor issues, as needed.       Next provider visit is scheduled for   Future Appointments         Provider Specialty Dept Phone    3/18/2025 3:50 PM Francisco Palafox MD Orthopedic Surgery [phone]    3/25/2025 8:50 AM Steven Schmitt MD Endocrinology [phone]    5/7/2025 1:30 PM Madina Angeles APRN - NP Neurology [phone]    7/28/2025 11:00 AM (Arrive by 10:45 AM) Rossana Treviño ANP Neurology [phone]                   ANDREAS MADRIGAL RN on  3/14/2025  at 3:24 PM    I have personally reviewed the health record, including provider notes, laboratory data and current medications before making these care and education recommendations.   This is not a billable encounter. Reimbursement will be per insulin pump company contract.  Total minutes: 30

## 2025-03-16 ENCOUNTER — TELEPHONE (OUTPATIENT)
Age: 55
End: 2025-03-16

## 2025-03-16 DIAGNOSIS — G21.19 DRUG-INDUCED PARKINSON'S DISEASE: ICD-10-CM

## 2025-03-17 RX ORDER — CARBIDOPA AND LEVODOPA 25; 100 MG/1; MG/1
1 TABLET ORAL 3 TIMES DAILY
Qty: 90 TABLET | Refills: 3 | Status: ON HOLD | OUTPATIENT
Start: 2025-03-17

## 2025-03-18 ENCOUNTER — TELEPHONE (OUTPATIENT)
Age: 55
End: 2025-03-18

## 2025-03-18 NOTE — TELEPHONE ENCOUNTER
PA initiated through covermymeds.com for  Dexcom G7 sensors    Per covermymeds.com:    \"Available without authorization. The member is able to fill the requested drug at the pharmacy.\"    Pt notified via [employer], waiting for response.

## 2025-03-19 RX ORDER — INSULIN LISPRO 100 [IU]/ML
INJECTION, SOLUTION INTRAVENOUS; SUBCUTANEOUS
Qty: 60 ML | Refills: 3 | Status: ON HOLD | OUTPATIENT
Start: 2025-03-19

## 2025-03-19 NOTE — TELEPHONE ENCOUNTER
Pt LVM asking if Dr Schmitt could send a new RX for Humalog Kwikpen with the instructions that state 66 units max. Pt states it is easier for her to pull insulin from the pen for her pump. Informed her fax will be checked this afternoon for Dexcom.

## 2025-03-22 ENCOUNTER — HOSPITAL ENCOUNTER (INPATIENT)
Facility: HOSPITAL | Age: 55
LOS: 2 days | Discharge: HOME | DRG: 291 | End: 2025-03-24
Attending: INTERNAL MEDICINE | Admitting: INTERNAL MEDICINE
Payer: MEDICARE

## 2025-03-22 ENCOUNTER — APPOINTMENT (OUTPATIENT)
Facility: HOSPITAL | Age: 55
DRG: 291 | End: 2025-03-22
Payer: MEDICARE

## 2025-03-22 DIAGNOSIS — R09.02 HYPOXEMIA: ICD-10-CM

## 2025-03-22 DIAGNOSIS — N18.30 STAGE 3 CHRONIC KIDNEY DISEASE, UNSPECIFIED WHETHER STAGE 3A OR 3B CKD (HCC): ICD-10-CM

## 2025-03-22 DIAGNOSIS — I50.813 ACUTE ON CHRONIC RIGHT-SIDED CONGESTIVE HEART FAILURE (HCC): ICD-10-CM

## 2025-03-22 DIAGNOSIS — R60.0 PERIPHERAL EDEMA: Primary | ICD-10-CM

## 2025-03-22 DIAGNOSIS — I50.9 ACUTE ON CHRONIC CONGESTIVE HEART FAILURE, UNSPECIFIED HEART FAILURE TYPE (HCC): ICD-10-CM

## 2025-03-22 PROBLEM — J96.01 ACUTE RESPIRATORY FAILURE WITH HYPOXIA: Status: ACTIVE | Noted: 2025-03-22

## 2025-03-22 LAB
ALBUMIN SERPL-MCNC: 3.5 G/DL (ref 3.5–5)
ALBUMIN/GLOB SERPL: 0.9 (ref 1.1–2.2)
ALP SERPL-CCNC: 120 U/L (ref 45–117)
ALT SERPL-CCNC: 32 U/L (ref 12–78)
ANION GAP SERPL CALC-SCNC: 6 MMOL/L (ref 2–12)
AST SERPL-CCNC: 43 U/L (ref 15–37)
BASOPHILS # BLD: 0.03 K/UL (ref 0–0.1)
BASOPHILS NFR BLD: 0.5 % (ref 0–1)
BILIRUB SERPL-MCNC: 0.2 MG/DL (ref 0.2–1)
BUN SERPL-MCNC: 42 MG/DL (ref 6–20)
BUN/CREAT SERPL: 24 (ref 12–20)
CALCIUM SERPL-MCNC: 8.6 MG/DL (ref 8.5–10.1)
CHLORIDE SERPL-SCNC: 102 MMOL/L (ref 97–108)
CO2 SERPL-SCNC: 28 MMOL/L (ref 21–32)
CREAT SERPL-MCNC: 1.78 MG/DL (ref 0.55–1.02)
DIFFERENTIAL METHOD BLD: ABNORMAL
EKG ATRIAL RATE: 77 BPM
EKG DIAGNOSIS: NORMAL
EKG P AXIS: 67 DEGREES
EKG P-R INTERVAL: 174 MS
EKG Q-T INTERVAL: 420 MS
EKG QRS DURATION: 84 MS
EKG QTC CALCULATION (BAZETT): 475 MS
EKG R AXIS: 124 DEGREES
EKG T AXIS: 74 DEGREES
EKG VENTRICULAR RATE: 77 BPM
EOSINOPHIL # BLD: 0.2 K/UL (ref 0–0.4)
EOSINOPHIL NFR BLD: 3.2 % (ref 0–7)
ERYTHROCYTE [DISTWIDTH] IN BLOOD BY AUTOMATED COUNT: 15.4 % (ref 11.5–14.5)
GLOBULIN SER CALC-MCNC: 3.7 G/DL (ref 2–4)
GLUCOSE SERPL-MCNC: 67 MG/DL (ref 65–100)
HCT VFR BLD AUTO: 35.1 % (ref 35–47)
HGB BLD-MCNC: 11.1 G/DL (ref 11.5–16)
IMM GRANULOCYTES # BLD AUTO: 0.01 K/UL (ref 0–0.04)
IMM GRANULOCYTES NFR BLD AUTO: 0.2 % (ref 0–0.5)
LYMPHOCYTES # BLD: 2.78 K/UL (ref 0.8–3.5)
LYMPHOCYTES NFR BLD: 44.8 % (ref 12–49)
MAGNESIUM SERPL-MCNC: 2.4 MG/DL (ref 1.6–2.4)
MCH RBC QN AUTO: 28.9 PG (ref 26–34)
MCHC RBC AUTO-ENTMCNC: 31.6 G/DL (ref 30–36.5)
MCV RBC AUTO: 91.4 FL (ref 80–99)
MONOCYTES # BLD: 0.61 K/UL (ref 0–1)
MONOCYTES NFR BLD: 9.8 % (ref 5–13)
NEUTS SEG # BLD: 2.58 K/UL (ref 1.8–8)
NEUTS SEG NFR BLD: 41.5 % (ref 32–75)
NRBC # BLD: 0 K/UL (ref 0–0.01)
NRBC BLD-RTO: 0 PER 100 WBC
NT PRO BNP: 286 PG/ML
PLATELET # BLD AUTO: 208 K/UL (ref 150–400)
PMV BLD AUTO: 10.5 FL (ref 8.9–12.9)
POTASSIUM SERPL-SCNC: 3.5 MMOL/L (ref 3.5–5.1)
PROT SERPL-MCNC: 7.2 G/DL (ref 6.4–8.2)
RBC # BLD AUTO: 3.84 M/UL (ref 3.8–5.2)
SODIUM SERPL-SCNC: 136 MMOL/L (ref 136–145)
TROPONIN I SERPL HS-MCNC: 7 NG/L (ref 0–51)
WBC # BLD AUTO: 6.2 K/UL (ref 3.6–11)

## 2025-03-22 PROCEDURE — 83735 ASSAY OF MAGNESIUM: CPT

## 2025-03-22 PROCEDURE — 94640 AIRWAY INHALATION TREATMENT: CPT

## 2025-03-22 PROCEDURE — 84484 ASSAY OF TROPONIN QUANT: CPT

## 2025-03-22 PROCEDURE — 2500000003 HC RX 250 WO HCPCS: Performed by: INTERNAL MEDICINE

## 2025-03-22 PROCEDURE — 36415 COLL VENOUS BLD VENIPUNCTURE: CPT

## 2025-03-22 PROCEDURE — 83880 ASSAY OF NATRIURETIC PEPTIDE: CPT

## 2025-03-22 PROCEDURE — 80053 COMPREHEN METABOLIC PANEL: CPT

## 2025-03-22 PROCEDURE — 6360000002 HC RX W HCPCS

## 2025-03-22 PROCEDURE — 6370000000 HC RX 637 (ALT 250 FOR IP): Performed by: INTERNAL MEDICINE

## 2025-03-22 PROCEDURE — 71046 X-RAY EXAM CHEST 2 VIEWS: CPT

## 2025-03-22 PROCEDURE — 96374 THER/PROPH/DIAG INJ IV PUSH: CPT

## 2025-03-22 PROCEDURE — 85025 COMPLETE CBC W/AUTO DIFF WBC: CPT

## 2025-03-22 PROCEDURE — 6360000002 HC RX W HCPCS: Performed by: INTERNAL MEDICINE

## 2025-03-22 PROCEDURE — 1100000000 HC RM PRIVATE

## 2025-03-22 PROCEDURE — 99285 EMERGENCY DEPT VISIT HI MDM: CPT

## 2025-03-22 RX ORDER — CITALOPRAM HYDROBROMIDE 20 MG/1
40 TABLET ORAL DAILY
Status: DISCONTINUED | OUTPATIENT
Start: 2025-03-22 | End: 2025-03-24 | Stop reason: HOSPADM

## 2025-03-22 RX ORDER — MIRTAZAPINE 15 MG/1
TABLET, FILM COATED ORAL
COMMUNITY
Start: 2025-02-07

## 2025-03-22 RX ORDER — ONDANSETRON 2 MG/ML
4 INJECTION INTRAMUSCULAR; INTRAVENOUS EVERY 4 HOURS PRN
Status: DISCONTINUED | OUTPATIENT
Start: 2025-03-22 | End: 2025-03-22

## 2025-03-22 RX ORDER — TIOTROPIUM BROMIDE AND OLODATEROL 3.124; 2.736 UG/1; UG/1
SPRAY, METERED RESPIRATORY (INHALATION)
COMMUNITY
Start: 2025-03-02

## 2025-03-22 RX ORDER — PREGABALIN 50 MG/1
CAPSULE ORAL
Status: ON HOLD | COMMUNITY
Start: 2025-03-03 | End: 2025-03-24 | Stop reason: HOSPADM

## 2025-03-22 RX ORDER — FUROSEMIDE 40 MG/1
TABLET ORAL
Status: ON HOLD | COMMUNITY
Start: 2025-03-13 | End: 2025-03-24

## 2025-03-22 RX ORDER — ROSUVASTATIN CALCIUM 20 MG/1
40 TABLET, COATED ORAL NIGHTLY
Status: DISCONTINUED | OUTPATIENT
Start: 2025-03-22 | End: 2025-03-24 | Stop reason: HOSPADM

## 2025-03-22 RX ORDER — ARIPIPRAZOLE 30 MG/1
30 TABLET ORAL DAILY
COMMUNITY
Start: 2025-03-10 | End: 2025-03-25

## 2025-03-22 RX ORDER — IPRATROPIUM BROMIDE AND ALBUTEROL SULFATE 2.5; .5 MG/3ML; MG/3ML
1 SOLUTION RESPIRATORY (INHALATION)
Status: DISCONTINUED | OUTPATIENT
Start: 2025-03-22 | End: 2025-03-24 | Stop reason: HOSPADM

## 2025-03-22 RX ORDER — SODIUM CHLORIDE 0.9 % (FLUSH) 0.9 %
5-40 SYRINGE (ML) INJECTION EVERY 12 HOURS SCHEDULED
Status: DISCONTINUED | OUTPATIENT
Start: 2025-03-22 | End: 2025-03-24 | Stop reason: HOSPADM

## 2025-03-22 RX ORDER — FUROSEMIDE 10 MG/ML
40 INJECTION INTRAMUSCULAR; INTRAVENOUS 2 TIMES DAILY
Status: DISCONTINUED | OUTPATIENT
Start: 2025-03-22 | End: 2025-03-24 | Stop reason: HOSPADM

## 2025-03-22 RX ORDER — SODIUM CHLORIDE 9 MG/ML
INJECTION, SOLUTION INTRAVENOUS PRN
Status: DISCONTINUED | OUTPATIENT
Start: 2025-03-22 | End: 2025-03-24 | Stop reason: HOSPADM

## 2025-03-22 RX ORDER — ALBUTEROL SULFATE 90 UG/1
INHALANT RESPIRATORY (INHALATION)
Status: ON HOLD | COMMUNITY
Start: 2025-03-16 | End: 2025-03-24

## 2025-03-22 RX ORDER — ENOXAPARIN SODIUM 100 MG/ML
40 INJECTION SUBCUTANEOUS DAILY
Status: DISCONTINUED | OUTPATIENT
Start: 2025-03-22 | End: 2025-03-24 | Stop reason: HOSPADM

## 2025-03-22 RX ORDER — LIDOCAINE 50 MG/G
PATCH TOPICAL
COMMUNITY
Start: 2025-03-05

## 2025-03-22 RX ORDER — SODIUM CHLORIDE 0.9 % (FLUSH) 0.9 %
5-40 SYRINGE (ML) INJECTION PRN
Status: DISCONTINUED | OUTPATIENT
Start: 2025-03-22 | End: 2025-03-24 | Stop reason: HOSPADM

## 2025-03-22 RX ORDER — FERROUS SULFATE 325(65) MG
TABLET ORAL EVERY OTHER DAY
COMMUNITY
Start: 2025-03-18

## 2025-03-22 RX ORDER — INSULIN GLARGINE 100 [IU]/ML
38 INJECTION, SOLUTION SUBCUTANEOUS NIGHTLY
Status: DISCONTINUED | OUTPATIENT
Start: 2025-03-22 | End: 2025-03-22

## 2025-03-22 RX ORDER — BUPRENORPHINE 15 UG/H
PATCH TRANSDERMAL
COMMUNITY
Start: 2025-03-16

## 2025-03-22 RX ORDER — ASPIRIN 81 MG/1
81 TABLET ORAL DAILY
Status: DISCONTINUED | OUTPATIENT
Start: 2025-03-22 | End: 2025-03-24 | Stop reason: HOSPADM

## 2025-03-22 RX ORDER — ACETAMINOPHEN 325 MG/1
650 TABLET ORAL EVERY 4 HOURS PRN
Status: DISCONTINUED | OUTPATIENT
Start: 2025-03-22 | End: 2025-03-22

## 2025-03-22 RX ORDER — ARIPIPRAZOLE 5 MG/1
30 TABLET ORAL DAILY
Status: DISCONTINUED | OUTPATIENT
Start: 2025-03-22 | End: 2025-03-24 | Stop reason: HOSPADM

## 2025-03-22 RX ORDER — CARBIDOPA AND LEVODOPA 25; 100 MG/1; MG/1
1 TABLET ORAL 3 TIMES DAILY
Status: DISCONTINUED | OUTPATIENT
Start: 2025-03-22 | End: 2025-03-24 | Stop reason: HOSPADM

## 2025-03-22 RX ORDER — PANTOPRAZOLE SODIUM 40 MG/1
40 TABLET, DELAYED RELEASE ORAL
Status: DISCONTINUED | OUTPATIENT
Start: 2025-03-23 | End: 2025-03-24 | Stop reason: HOSPADM

## 2025-03-22 RX ORDER — ACETAMINOPHEN 325 MG/1
650 TABLET ORAL EVERY 6 HOURS PRN
Status: DISCONTINUED | OUTPATIENT
Start: 2025-03-22 | End: 2025-03-24 | Stop reason: HOSPADM

## 2025-03-22 RX ORDER — BUMETANIDE 0.25 MG/ML
1 INJECTION, SOLUTION INTRAMUSCULAR; INTRAVENOUS
Status: COMPLETED | OUTPATIENT
Start: 2025-03-22 | End: 2025-03-22

## 2025-03-22 RX ORDER — POLYETHYLENE GLYCOL 3350 17 G/17G
17 POWDER, FOR SOLUTION ORAL DAILY PRN
Status: DISCONTINUED | OUTPATIENT
Start: 2025-03-22 | End: 2025-03-24 | Stop reason: HOSPADM

## 2025-03-22 RX ORDER — BREXPIPRAZOLE 2 MG/1
2 TABLET ORAL DAILY
COMMUNITY
Start: 2025-03-11

## 2025-03-22 RX ORDER — ACETAMINOPHEN 650 MG/1
650 SUPPOSITORY RECTAL EVERY 6 HOURS PRN
Status: DISCONTINUED | OUTPATIENT
Start: 2025-03-22 | End: 2025-03-24 | Stop reason: HOSPADM

## 2025-03-22 RX ORDER — INSULIN LISPRO 100 [IU]/ML
0-8 INJECTION, SOLUTION INTRAVENOUS; SUBCUTANEOUS
Status: DISCONTINUED | OUTPATIENT
Start: 2025-03-22 | End: 2025-03-22

## 2025-03-22 RX ORDER — ALPRAZOLAM 0.5 MG
1 TABLET ORAL 4 TIMES DAILY PRN
Status: DISCONTINUED | OUTPATIENT
Start: 2025-03-22 | End: 2025-03-24 | Stop reason: HOSPADM

## 2025-03-22 RX ORDER — MIRTAZAPINE 15 MG/1
15 TABLET, FILM COATED ORAL NIGHTLY
Status: DISCONTINUED | OUTPATIENT
Start: 2025-03-22 | End: 2025-03-24 | Stop reason: HOSPADM

## 2025-03-22 RX ADMIN — BUSPIRONE HYDROCHLORIDE 15 MG: 5 TABLET ORAL at 20:34

## 2025-03-22 RX ADMIN — FUROSEMIDE 40 MG: 10 INJECTION, SOLUTION INTRAMUSCULAR; INTRAVENOUS at 20:30

## 2025-03-22 RX ADMIN — ASPIRIN 81 MG: 81 TABLET, COATED ORAL at 20:30

## 2025-03-22 RX ADMIN — BUMETANIDE 1 MG: 0.25 INJECTION INTRAMUSCULAR; INTRAVENOUS at 17:00

## 2025-03-22 RX ADMIN — MIRTAZAPINE 15 MG: 15 TABLET, FILM COATED ORAL at 20:34

## 2025-03-22 RX ADMIN — CITALOPRAM HYDROBROMIDE 40 MG: 20 TABLET ORAL at 20:30

## 2025-03-22 RX ADMIN — SODIUM CHLORIDE, PRESERVATIVE FREE 10 ML: 5 INJECTION INTRAVENOUS at 20:34

## 2025-03-22 RX ADMIN — ROSUVASTATIN CALCIUM 40 MG: 20 TABLET, FILM COATED ORAL at 20:34

## 2025-03-22 RX ADMIN — ALPRAZOLAM 1 MG: 0.5 TABLET ORAL at 23:53

## 2025-03-22 RX ADMIN — IPRATROPIUM BROMIDE AND ALBUTEROL SULFATE 1 DOSE: .5; 3 SOLUTION RESPIRATORY (INHALATION) at 21:35

## 2025-03-22 RX ADMIN — CARBIDOPA AND LEVODOPA 1 TABLET: 25; 100 TABLET ORAL at 20:30

## 2025-03-22 RX ADMIN — ENOXAPARIN SODIUM 40 MG: 100 INJECTION SUBCUTANEOUS at 20:30

## 2025-03-22 RX ADMIN — ARIPIPRAZOLE 30 MG: 5 TABLET ORAL at 20:29

## 2025-03-22 ASSESSMENT — PAIN DESCRIPTION - LOCATION: LOCATION: LEG

## 2025-03-22 ASSESSMENT — PAIN SCALES - GENERAL
PAINLEVEL_OUTOF10: 7
PAINLEVEL_OUTOF10: 0

## 2025-03-22 NOTE — H&P
Hospitalist Admission Note    NAME:   Anette Cannon   : 1970   MRN: [id number]     Date/Time: 3/22/2025 5:43 PM    Patient PCP: Jair Vieira MD    ______________________________________________________________________  Given the patient's current clinical presentation, I have a high level of concern for decompensation if discharged from the emergency department.  Complex decision making was performed, which includes reviewing the patient's available past medical records, laboratory results, and x-ray films.       My assessment of this patient's clinical condition and my plan of care is as follows.    Assessment / Plan:    Acute respiratory failure with hypoxia POA-88% on room air in ED requiring 2-3 L/m nasal cannula oxygen in ED  Due to acute pulmonary edema POA  Due to suspected acute on chronic diastolic CHF POA-claims to have gained ~30 pounds in past few days, blames it on Lyrica that was started new  PERLA on CKD stage III-baseline creatinine around 1.2  History of COPD not on home oxygen currently but has oxygen for as needed use at baseline  Diabetes type 1 on insulin pump-patient wants to use her pump hospital -sees the endocrinologist Dr. Steven Schmitt  Hypothyroidism  Hyperlipidemia  Hypertension-controlled  Psychiatric disorder with panic attacks and PTSD  GERD  Chronic pain syndrome on weekly buprenorphine  Creatinine 1.78  Troponin 7  proBNP 286  LFTs normal  TSH 0.65  Hemoglobin 11.1  Chest x-ray relatively clear  EKG normal sinus rhythm 77 bpm nonspecific ST and T wave changes  Last echo 3/24= EF 55 to 60% mild LVH    Admit to remote telemetry bed  Status post IV Bumex x 1 in ED with good diuresis output, will continue Lasix 40 mg twice daily for now  Daily BMP with magnesium checks-replenish lytes as needed  Daily weight  Strict I's and O's  Check 2D echo  DuoNeb every 6 hours RT for now  Continue home aspirin  Continue home statin  Continue home Synthroid  Continue home

## 2025-03-22 NOTE — ED PROVIDER NOTES
[facility] EMERGENCY DEPARTMENT  EMERGENCY DEPARTMENT ENCOUNTER       Pt Name: Anette Cannon  MRN: [id number]  Birthdate 1970  Date of evaluation: 3/22/2025  Provider: Angelica Dao PA-C   PCP: Jair Vieira MD  Note Started: 3:28 PM EDT 3/22/25     CHIEF COMPLAINT       Chief Complaint   Patient presents with    Leg Swelling     Pt BIBEMS from home for cc of bilateral leg swelling, acute on chronic. Pt states MD told her to take an extra fluid pill but did not help. Pt states she not had much urine output since this AM.  PTA.     Shortness of Breath     Pt states since last PM, unsure if d/t fluid retention.         HISTORY OF PRESENT ILLNESS: 1 or more elements      History From: Patient  HPI Limitations: None     Anette Cannon is a 55 y.o. female who presents via ambulance to the emergency department for evaluation of bilateral leg swelling.  Patient additionally reports shortness of breath that began last night.  Patient states that about 2 or 3 days ago she was started on Lyrica, states that since that time she has gained approximately 30 pounds and has become increasingly swollen.  Patient reports contacting her primary provider who recommended she take extra fluid pills however patient states this did not work.  Patient states she had both Lasix and Bumex at home, states the Lasix were not working so she threw them out, states she is taking her Bumex.  Patient states that her urine output has decreased significantly despite taking a diuretic.  Patient states that since this swelling has begun she has become increasingly short of breath, states that she has an oxygen concentrator at home that she has not needed in months however needed it last night.  Patient reports history of CKD (stage III), COPD, CHF, type 1 diabetes mellitus, drug-induced parkinsonism, and history of hypoxic respiratory distress.     Nursing Notes were all reviewed and agreed with or any disagreements were

## 2025-03-22 NOTE — ED TRIAGE NOTES
TRANSFER - OUT REPORT:    Verbal report given to ADELIA Calixto on Anette Cannon  being transferred to 3210 for routine progression of patient care       Report consisted of patient's Situation, Background, Assessment and   Recommendations(SBAR).     Information from the following report(s) Nurse Handoff Report was reviewed with the receiving nurse.    Rosario Fall Assessment:    Presents to emergency department  because of falls (Syncope, seizure, or loss of consciousness): No  Age > 70: No  Altered Mental Status, Intoxication with alcohol or substance confusion (Disorientation, impaired judgment, poor safety awaremess, or inability to follow instructions): No  Impaired Mobility: Ambulates or transfers with assistive devices or assistance; Unable to ambulate or transer.: No  Nursing Judgement: No          Lines:   Peripheral IV 03/22/25 Distal;Posterior;Right Forearm (Active)        Opportunity for questions and clarification was provided.      Patient transported with:  Tech

## 2025-03-22 NOTE — ED NOTES
ptO2 sats 88% on RA, sts she wears O2 at home when she is overloaded. Placed pt on 3l Per Nsal canula O2 sats improved to 96%

## 2025-03-23 LAB
ANION GAP SERPL CALC-SCNC: 6 MMOL/L (ref 2–12)
BUN SERPL-MCNC: 42 MG/DL (ref 6–20)
BUN/CREAT SERPL: 29 (ref 12–20)
CALCIUM SERPL-MCNC: 8.1 MG/DL (ref 8.5–10.1)
CHLORIDE SERPL-SCNC: 102 MMOL/L (ref 97–108)
CO2 SERPL-SCNC: 30 MMOL/L (ref 21–32)
CREAT SERPL-MCNC: 1.43 MG/DL (ref 0.55–1.02)
GLUCOSE BLD STRIP.AUTO-MCNC: 284 MG/DL (ref 65–117)
GLUCOSE SERPL-MCNC: 140 MG/DL (ref 65–100)
MAGNESIUM SERPL-MCNC: 2.2 MG/DL (ref 1.6–2.4)
POTASSIUM SERPL-SCNC: 3.4 MMOL/L (ref 3.5–5.1)
SERVICE CMNT-IMP: ABNORMAL
SODIUM SERPL-SCNC: 138 MMOL/L (ref 136–145)
TSH SERPL DL<=0.05 MIU/L-ACNC: 0.65 UIU/ML (ref 0.36–3.74)

## 2025-03-23 PROCEDURE — 1100000000 HC RM PRIVATE

## 2025-03-23 PROCEDURE — 84443 ASSAY THYROID STIM HORMONE: CPT

## 2025-03-23 PROCEDURE — 2500000003 HC RX 250 WO HCPCS: Performed by: INTERNAL MEDICINE

## 2025-03-23 PROCEDURE — 94640 AIRWAY INHALATION TREATMENT: CPT

## 2025-03-23 PROCEDURE — 6360000002 HC RX W HCPCS: Performed by: INTERNAL MEDICINE

## 2025-03-23 PROCEDURE — 83735 ASSAY OF MAGNESIUM: CPT

## 2025-03-23 PROCEDURE — 6370000000 HC RX 637 (ALT 250 FOR IP): Performed by: INTERNAL MEDICINE

## 2025-03-23 PROCEDURE — 82962 GLUCOSE BLOOD TEST: CPT

## 2025-03-23 PROCEDURE — 80048 BASIC METABOLIC PNL TOTAL CA: CPT

## 2025-03-23 PROCEDURE — 36415 COLL VENOUS BLD VENIPUNCTURE: CPT

## 2025-03-23 RX ORDER — GLUCAGON 1 MG/ML
1 KIT INJECTION PRN
Status: DISCONTINUED | OUTPATIENT
Start: 2025-03-23 | End: 2025-03-24 | Stop reason: HOSPADM

## 2025-03-23 RX ORDER — ARFORMOTEROL TARTRATE 15 UG/2ML
15 SOLUTION RESPIRATORY (INHALATION)
Status: DISCONTINUED | OUTPATIENT
Start: 2025-03-23 | End: 2025-03-24 | Stop reason: HOSPADM

## 2025-03-23 RX ORDER — POTASSIUM CHLORIDE 1500 MG/1
20 TABLET, EXTENDED RELEASE ORAL DAILY
Status: DISCONTINUED | OUTPATIENT
Start: 2025-03-23 | End: 2025-03-24 | Stop reason: HOSPADM

## 2025-03-23 RX ORDER — INSULIN LISPRO 100 [IU]/ML
200 INJECTION, SOLUTION INTRAVENOUS; SUBCUTANEOUS ONCE
Status: COMPLETED | OUTPATIENT
Start: 2025-03-23 | End: 2025-03-24

## 2025-03-23 RX ORDER — INSULIN LISPRO 100 [IU]/ML
1000 INJECTION, SOLUTION INTRAVENOUS; SUBCUTANEOUS ONCE
Status: DISCONTINUED | OUTPATIENT
Start: 2025-03-23 | End: 2025-03-23

## 2025-03-23 RX ORDER — BUPRENORPHINE 15 UG/H
1 PATCH TRANSDERMAL WEEKLY
Status: DISCONTINUED | OUTPATIENT
Start: 2025-03-23 | End: 2025-03-24 | Stop reason: HOSPADM

## 2025-03-23 RX ORDER — DEXTROSE MONOHYDRATE 100 MG/ML
INJECTION, SOLUTION INTRAVENOUS CONTINUOUS PRN
Status: DISCONTINUED | OUTPATIENT
Start: 2025-03-23 | End: 2025-03-24 | Stop reason: HOSPADM

## 2025-03-23 RX ADMIN — PANTOPRAZOLE SODIUM 40 MG: 40 TABLET, DELAYED RELEASE ORAL at 06:35

## 2025-03-23 RX ADMIN — IPRATROPIUM BROMIDE AND ALBUTEROL SULFATE 1 DOSE: .5; 3 SOLUTION RESPIRATORY (INHALATION) at 07:44

## 2025-03-23 RX ADMIN — ACETAMINOPHEN 650 MG: 325 TABLET ORAL at 14:36

## 2025-03-23 RX ADMIN — ROSUVASTATIN CALCIUM 40 MG: 20 TABLET, FILM COATED ORAL at 21:12

## 2025-03-23 RX ADMIN — FUROSEMIDE 40 MG: 10 INJECTION, SOLUTION INTRAMUSCULAR; INTRAVENOUS at 17:36

## 2025-03-23 RX ADMIN — LEVOTHYROXINE SODIUM 125 MCG: 0.1 TABLET ORAL at 06:35

## 2025-03-23 RX ADMIN — MIRTAZAPINE 15 MG: 15 TABLET, FILM COATED ORAL at 21:13

## 2025-03-23 RX ADMIN — POTASSIUM CHLORIDE 20 MEQ: 1500 TABLET, EXTENDED RELEASE ORAL at 09:16

## 2025-03-23 RX ADMIN — CARBIDOPA AND LEVODOPA 1 TABLET: 25; 100 TABLET ORAL at 21:13

## 2025-03-23 RX ADMIN — CITALOPRAM HYDROBROMIDE 40 MG: 20 TABLET ORAL at 08:46

## 2025-03-23 RX ADMIN — ENOXAPARIN SODIUM 40 MG: 100 INJECTION SUBCUTANEOUS at 08:46

## 2025-03-23 RX ADMIN — ARIPIPRAZOLE 30 MG: 5 TABLET ORAL at 08:46

## 2025-03-23 RX ADMIN — SODIUM CHLORIDE, PRESERVATIVE FREE 10 ML: 5 INJECTION INTRAVENOUS at 21:13

## 2025-03-23 RX ADMIN — SODIUM CHLORIDE, PRESERVATIVE FREE 10 ML: 5 INJECTION INTRAVENOUS at 08:53

## 2025-03-23 RX ADMIN — ARFORMOTEROL TARTRATE 15 MCG: 15 SOLUTION RESPIRATORY (INHALATION) at 21:33

## 2025-03-23 RX ADMIN — BUSPIRONE HYDROCHLORIDE 15 MG: 5 TABLET ORAL at 08:45

## 2025-03-23 RX ADMIN — FUROSEMIDE 40 MG: 10 INJECTION, SOLUTION INTRAMUSCULAR; INTRAVENOUS at 08:46

## 2025-03-23 RX ADMIN — CARBIDOPA AND LEVODOPA 1 TABLET: 25; 100 TABLET ORAL at 08:46

## 2025-03-23 RX ADMIN — ACETAMINOPHEN 650 MG: 325 TABLET ORAL at 08:46

## 2025-03-23 RX ADMIN — IPRATROPIUM BROMIDE AND ALBUTEROL SULFATE 1 DOSE: .5; 3 SOLUTION RESPIRATORY (INHALATION) at 21:28

## 2025-03-23 RX ADMIN — ALPRAZOLAM 1 MG: 0.5 TABLET ORAL at 21:12

## 2025-03-23 RX ADMIN — ASPIRIN 81 MG: 81 TABLET, COATED ORAL at 08:45

## 2025-03-23 RX ADMIN — CARBIDOPA AND LEVODOPA 1 TABLET: 25; 100 TABLET ORAL at 14:01

## 2025-03-23 RX ADMIN — BUSPIRONE HYDROCHLORIDE 15 MG: 5 TABLET ORAL at 21:12

## 2025-03-23 RX ADMIN — IPRATROPIUM BROMIDE AND ALBUTEROL SULFATE 1 DOSE: .5; 3 SOLUTION RESPIRATORY (INHALATION) at 11:47

## 2025-03-23 RX ADMIN — IPRATROPIUM BROMIDE AND ALBUTEROL SULFATE 1 DOSE: .5; 3 SOLUTION RESPIRATORY (INHALATION) at 15:46

## 2025-03-23 RX ADMIN — ALPRAZOLAM 1 MG: 0.5 TABLET ORAL at 14:01

## 2025-03-23 RX ADMIN — BUSPIRONE HYDROCHLORIDE 15 MG: 5 TABLET ORAL at 14:01

## 2025-03-23 ASSESSMENT — PAIN SCALES - GENERAL
PAINLEVEL_OUTOF10: 8
PAINLEVEL_OUTOF10: 0
PAINLEVEL_OUTOF10: 10
PAINLEVEL_OUTOF10: 8

## 2025-03-23 ASSESSMENT — PAIN DESCRIPTION - ORIENTATION
ORIENTATION: RIGHT;LEFT
ORIENTATION: RIGHT

## 2025-03-23 ASSESSMENT — PAIN SCALES - WONG BAKER: WONGBAKER_NUMERICALRESPONSE: NO HURT

## 2025-03-23 ASSESSMENT — PAIN DESCRIPTION - LOCATION
LOCATION: KNEE
LOCATION: KNEE

## 2025-03-23 ASSESSMENT — PAIN DESCRIPTION - DESCRIPTORS
DESCRIPTORS: ACHING;THROBBING
DESCRIPTORS: ACHING

## 2025-03-23 NOTE — RT PROTOCOL NOTE
ADULT PROTOCOL: JET AEROSOL ASSESSMENT    Patient  Anette Cannon     55 y.o.   female     3/23/2025  3:26 PM    Breath Sounds Pre Procedure: Breath Sounds Pre-Tx AMOL: Diminished, Clear                                  Breath Sounds Pre-Tx LLL: Diminished        Breath Sounds Pre-Tx RUL: Diminished, Clear        Breath Sounds Pre-Tx RML: Diminished        Breath Sounds Pre-Tx RLL: Diminished  Breath Sounds Post Procedure: Breath Sounds Post-Tx AMOL: Diminished, Clear          Breath Sounds Post-Tx LLL: Diminished          Breath Sounds Post-Tx RUL: Diminished, Clear          Breath Sounds Post-Tx RML: Diminished          Breath Sounds Post-Tx RLL: Diminished                                       Heart Rate: Pre procedure Pre-Tx Pulse: 67           Post procedure Post-Tx Pulse: 85    Resp Rate: Pre procedure Pre-Tx Resps: 18           Post procedure Post-Tx Resps: 20      Oxygen: O2 Therapy: Room air        Changed: No    SpO2:  SpO2: 94 %   without Oxygen                Nebulizer Therapy: Current medications Medications: Albuterol/Ipratropium      Changed: Yes    Comments: Added  BID LABA per home regimen.     Problem List:   Patient Active Problem List   Diagnosis    COPD exacerbation (HCC)    Endometriosis    Tobacco abuse    Aspiration pneumonia (HCC)    CKD (chronic kidney disease) stage 3, GFR 30-59 ml/min (HCC)    Essential hypertension    Severe obesity    Change in mental status    Abnormal weight gain    Drug overdose    PERLA (acute kidney injury)    Respiratory failure, acute (HCC)    Elevated LFTs    Positive blood culture    Hyperlipidemia LDL goal <100    Sepsis (HCC)    Anxiety and depression    Diabetic nephropathy associated with type 1 diabetes mellitus (HCC)    Uncontrolled type I diabetes mellitus with neuropathy    Bilateral leg edema    DKA (diabetic ketoacidosis) (HCC)    Hypoglycemia    Hyponatremia    COVID-19    Type 1 diabetes mellitus with hyperglycemia (HCC)    Hypoglycemia due to type 1

## 2025-03-23 NOTE — PLAN OF CARE
Problem: Chronic Conditions and Co-morbidities  Goal: Patient's chronic conditions and co-morbidity symptoms are monitored and maintained or improved  3/23/2025 0900 by Marily Mccarthy RN  Outcome: Progressing  3/23/2025 0053 by Alan Guadalupe RN  Outcome: Progressing     Problem: Discharge Planning  Goal: Discharge to home or other facility with appropriate resources  3/23/2025 0900 by Marily Mccarthy RN  Outcome: Progressing  3/23/2025 0053 by Alan Guadalupe RN  Outcome: Progressing  Flowsheets (Taken 3/22/2025 2022)  Discharge to home or other facility with appropriate resources:   Identify barriers to discharge with patient and caregiver   Arrange for needed discharge resources and transportation as appropriate   Identify discharge learning needs (meds, wound care, etc)   Arrange for interpreters to assist at discharge as needed   Refer to discharge planning if patient needs post-hospital services based on physician order or complex needs related to functional status, cognitive ability or social support system     Problem: Respiratory - Adult  Goal: Achieves optimal ventilation and oxygenation  3/23/2025 0900 by Marily Mccarthy RN  Outcome: Progressing  3/23/2025 0746 by Jonathan Ruiz, RT  Outcome: Progressing  3/23/2025 0053 by Alan Guadalupe RN  Outcome: Progressing  3/22/2025 2134 by Jo Lo, RT  Outcome: Progressing     Problem: Skin/Tissue Integrity - Adult  Goal: Skin integrity remains intact  3/23/2025 0900 by Marily Mccarthy RN  Outcome: Progressing  3/23/2025 0053 by Alan Guadalupe RN  Outcome: Progressing  Goal: Oral mucous membranes remain intact  3/23/2025 0900 by Marily Mccarthy RN  Outcome: Progressing  3/23/2025 0053 by Alan Guadalupe RN  Outcome: Progressing     Problem: Musculoskeletal - Adult  Goal: Return mobility to safest level of function  3/23/2025 0900 by Marily Mccarthy RN  Outcome: Progressing  3/23/2025 0053 by Alan Guadalupe RN  Outcome: Progressing  Goal: Maintain

## 2025-03-23 NOTE — PLAN OF CARE
Problem: Chronic Conditions and Co-morbidities  Goal: Patient's chronic conditions and co-morbidity symptoms are monitored and maintained or improved  Outcome: Progressing     Problem: Discharge Planning  Goal: Discharge to home or other facility with appropriate resources  Outcome: Progressing  Flowsheets (Taken 3/22/2025 2022)  Discharge to home or other facility with appropriate resources:   Identify barriers to discharge with patient and caregiver   Arrange for needed discharge resources and transportation as appropriate   Identify discharge learning needs (meds, wound care, etc)   Arrange for interpreters to assist at discharge as needed   Refer to discharge planning if patient needs post-hospital services based on physician order or complex needs related to functional status, cognitive ability or social support system     Problem: Respiratory - Adult  Goal: Achieves optimal ventilation and oxygenation  3/23/2025 0053 by Alan Guadalupe RN  Outcome: Progressing  3/22/2025 2134 by Jo Lo, RT  Outcome: Progressing     Problem: Skin/Tissue Integrity - Adult  Goal: Skin integrity remains intact  Outcome: Progressing  Goal: Oral mucous membranes remain intact  Outcome: Progressing     Problem: Musculoskeletal - Adult  Goal: Return mobility to safest level of function  Outcome: Progressing  Goal: Maintain proper alignment of affected body part  Outcome: Progressing  Goal: Return ADL status to a safe level of function  Outcome: Progressing     Problem: Gastrointestinal - Adult  Goal: Minimal or absence of nausea and vomiting  Outcome: Progressing  Goal: Maintains or returns to baseline bowel function  Outcome: Progressing  Goal: Maintains adequate nutritional intake  Outcome: Progressing     Problem: Infection - Adult  Goal: Absence of infection at discharge  Outcome: Progressing  Goal: Absence of infection during hospitalization  Outcome: Progressing     Problem: Metabolic/Fluid and Electrolytes -

## 2025-03-23 NOTE — PROGRESS NOTES
Hospitalist Progress Note    NAME:   Anette Cannon   : 1970   MRN: [id number]     Date/Time: 3/23/2025 8:48 AM  Patient PCP: Jair Vieira MD    Estimated discharge date:  Barriers:       Assessment / Plan:        Acute respiratory failure with hypoxia POA-88% on room air in ED requiring 2-3 L/m nasal cannula oxygen in ED  Due to acute pulmonary edema POA  Due to suspected acute on chronic diastolic CHF POA-claims to have gained ~30 pounds in past few days, blames it on Lyrica that was started new  PERLA on CKD stage III-baseline creatinine around 1.2  History of COPD not on home oxygen currently but has oxygen for as needed use at baseline  Diabetes type 1 on insulin pump-patient wants to use her pump hospital -sees the endocrinologist Dr. Steven Schmitt  Hypothyroidism  Hyperlipidemia  Hypertension-controlled  Psychiatric disorder with panic attacks and PTSD  GERD  Chronic pain syndrome on weekly buprenorphine  Creatinine 1.78  Troponin 7  proBNP 286  LFTs normal  TSH 0.65  Hemoglobin 11.1  Chest x-ray relatively clear  EKG normal sinus rhythm 77 bpm nonspecific ST and T wave changes  Last echo 3/24= EF 55 to 60% mild LVH     Continue remote telemetry bed  Oxygen to keep sats greater than 90%, taper off to room air in next 24 hours with diuresis  Status post IV Bumex x 1 in ED with good diuresis output, continue IV Lasix 40 mg twice daily for now  Daily BMP with magnesium checks-replenish lytes as needed  Daily weight  Strict I's and O's  Check 2D echo  DuoNeb every 6 hours RT for now  Continue home aspirin  Continue home statin  Continue home Synthroid  Continue home PPI  Patient wants to use her home insulin pump in the hospital and CGM for glucose monitoring  Continue home psych meds including Sinemet?  Inpatient pharmacy consult for med recc as patient has polypharmacy evident from medication reviewed  Consider inpatient cardiology consult if echo abnormal                 Medical Decision

## 2025-03-23 NOTE — PROGRESS NOTES
End of Shift Note    Bedside shift change report given to ADELIA Calixto (oncoming nurse) by Alan Guadalupe RN (offgoing nurse).  Report included the following information SBAR, Kardex, Intake/Output, MAR, Recent Results, Cardiac Rhythm Sinus Rhythm, and Quality Measures    Shift worked:  1641-3024     Shift summary and any significant changes:     Pt noncompliance to diet. Education given to pt to avoid foods high in sodium and limit fluid intake for improvement of swelling on her legs. Pt verbalized understanding. No significant changes during this shift. No other complaints at the time of shift change.       Concerns for physician to address:  See above     Zone phone for oncoming shift:         Activity:  Level of Assistance: Independent  Number times ambulated in hallways past shift: 1  Number of times OOB to chair past shift: 4    Cardiac:   Cardiac Monitoring: Yes      Cardiac Rhythm: Sinus rhythm    Access:  Current line(s): PIV     Genitourinary:   Urinary Status: Voiding, Bathroom privileges    Respiratory:   O2 Device: None (Room air)  Chronic home O2 use?: NO  Incentive spirometer at bedside: NO    GI:  Last BM (including prior to admit): 03/22/25  Current diet:  ADULT DIET; Regular; 4 carb choices (60 gm/meal); Low Fat/Low Chol/High Fiber/2 gm Na; 1500 ml  Passing flatus: YES    Pain Management:   Patient states pain is manageable on current regimen: N/A    Skin:  José Antonio Scale Score: 20  Interventions: Wound Offloading (Prevention Methods): Turning, Repositioning, Pillows    Patient Safety:  Fall Risk: Nursing Judgement-Fall Risk High(Add Comments): Yes  Fall Risk Interventions  Nursing Judgement-Fall Risk High(Add Comments): Yes  Toilet Every 2 Hours-In Advance of Need: Yes  Hourly Visual Checks: In bed, Awake  Fall Visual Posted: Socks, Fall sign posted, Armband  Room Door Open: Yes  Alarm On: Bed  Patient Moved Closer to Nursing Station: Yes    Active Consults:   IP CONSULT TO PHARMACY    Length of

## 2025-03-24 ENCOUNTER — TELEPHONE (OUTPATIENT)
Age: 55
End: 2025-03-24

## 2025-03-24 ENCOUNTER — APPOINTMENT (OUTPATIENT)
Facility: HOSPITAL | Age: 55
DRG: 291 | End: 2025-03-24
Attending: INTERNAL MEDICINE
Payer: MEDICARE

## 2025-03-24 VITALS
TEMPERATURE: 98.2 F | WEIGHT: 201.5 LBS | OXYGEN SATURATION: 94 % | HEIGHT: 65 IN | RESPIRATION RATE: 20 BRPM | BODY MASS INDEX: 33.57 KG/M2 | SYSTOLIC BLOOD PRESSURE: 115 MMHG | HEART RATE: 74 BPM | DIASTOLIC BLOOD PRESSURE: 78 MMHG

## 2025-03-24 LAB
ANION GAP SERPL CALC-SCNC: 5 MMOL/L (ref 2–12)
BUN SERPL-MCNC: 33 MG/DL (ref 6–20)
BUN/CREAT SERPL: 24 (ref 12–20)
CALCIUM SERPL-MCNC: 8 MG/DL (ref 8.5–10.1)
CHLORIDE SERPL-SCNC: 102 MMOL/L (ref 97–108)
CO2 SERPL-SCNC: 28 MMOL/L (ref 21–32)
CREAT SERPL-MCNC: 1.35 MG/DL (ref 0.55–1.02)
ECHO AO ROOT DIAM: 3 CM
ECHO AO ROOT INDEX: 1.52 CM/M2
ECHO AV MEAN GRADIENT: 8 MMHG
ECHO AV MEAN VELOCITY: 1.3 M/S
ECHO AV PEAK GRADIENT: 15 MMHG
ECHO AV PEAK VELOCITY: 2 M/S
ECHO AV VTI: 44.5 CM
ECHO BSA: 2.03 M2
ECHO EST RA PRESSURE: 10 MMHG
ECHO LA DIAMETER INDEX: 1.82 CM/M2
ECHO LA DIAMETER: 3.6 CM
ECHO LA TO AORTIC ROOT RATIO: 1.2
ECHO LV EDV A2C: 120 ML
ECHO LV EDV A4C: 75 ML
ECHO LV EDV BP: 97 ML (ref 56–104)
ECHO LV EDV INDEX A4C: 38 ML/M2
ECHO LV EDV INDEX BP: 49 ML/M2
ECHO LV EDV NDEX A2C: 61 ML/M2
ECHO LV EF PHYSICIAN: 55 %
ECHO LV EJECTION FRACTION A2C: 72 %
ECHO LV EJECTION FRACTION A4C: 71 %
ECHO LV EJECTION FRACTION BIPLANE: 71 % (ref 55–100)
ECHO LV ESV A2C: 34 ML
ECHO LV ESV A4C: 22 ML
ECHO LV ESV BP: 28 ML (ref 19–49)
ECHO LV ESV INDEX A2C: 17 ML/M2
ECHO LV ESV INDEX A4C: 11 ML/M2
ECHO LV ESV INDEX BP: 14 ML/M2
ECHO LV FRACTIONAL SHORTENING: 37 % (ref 28–44)
ECHO LV INTERNAL DIMENSION DIASTOLE INDEX: 2.73 CM/M2
ECHO LV INTERNAL DIMENSION DIASTOLIC: 5.4 CM (ref 3.9–5.3)
ECHO LV INTERNAL DIMENSION SYSTOLIC INDEX: 1.72 CM/M2
ECHO LV INTERNAL DIMENSION SYSTOLIC: 3.4 CM
ECHO LV IVSD: 0.8 CM (ref 0.6–0.9)
ECHO LV MASS 2D: 180.1 G (ref 67–162)
ECHO LV MASS INDEX 2D: 91 G/M2 (ref 43–95)
ECHO LV POSTERIOR WALL DIASTOLIC: 1 CM (ref 0.6–0.9)
ECHO LV RELATIVE WALL THICKNESS RATIO: 0.37
ECHO LVOT AREA: 3.8 CM2
ECHO LVOT DIAM: 2.2 CM
ECHO MV A VELOCITY: 0.89 M/S
ECHO MV E DECELERATION TIME (DT): 190.2 MS
ECHO MV E VELOCITY: 1.05 M/S
ECHO MV E/A RATIO: 1.18
ECHO RIGHT VENTRICULAR SYSTOLIC PRESSURE (RVSP): 28 MMHG
ECHO RV INTERNAL DIMENSION: 2.7 CM
ECHO RV TAPSE: 2.3 CM (ref 1.7–?)
ECHO TV REGURGITANT MAX VELOCITY: 2.14 M/S
ECHO TV REGURGITANT PEAK GRADIENT: 18 MMHG
EST. AVERAGE GLUCOSE BLD GHB EST-MCNC: 186 MG/DL
GLUCOSE BLD STRIP.AUTO-MCNC: 164 MG/DL (ref 65–117)
GLUCOSE BLD STRIP.AUTO-MCNC: 84 MG/DL (ref 65–117)
GLUCOSE SERPL-MCNC: 282 MG/DL (ref 65–100)
HBA1C MFR BLD: 8.1 % (ref 4–5.6)
POTASSIUM SERPL-SCNC: 3.8 MMOL/L (ref 3.5–5.1)
SERVICE CMNT-IMP: ABNORMAL
SERVICE CMNT-IMP: NORMAL
SODIUM SERPL-SCNC: 135 MMOL/L (ref 136–145)

## 2025-03-24 PROCEDURE — 80048 BASIC METABOLIC PNL TOTAL CA: CPT

## 2025-03-24 PROCEDURE — 6370000000 HC RX 637 (ALT 250 FOR IP): Performed by: INTERNAL MEDICINE

## 2025-03-24 PROCEDURE — 36415 COLL VENOUS BLD VENIPUNCTURE: CPT

## 2025-03-24 PROCEDURE — 6360000002 HC RX W HCPCS: Performed by: INTERNAL MEDICINE

## 2025-03-24 PROCEDURE — 83036 HEMOGLOBIN GLYCOSYLATED A1C: CPT

## 2025-03-24 PROCEDURE — 93308 TTE F-UP OR LMTD: CPT

## 2025-03-24 PROCEDURE — 2500000003 HC RX 250 WO HCPCS: Performed by: INTERNAL MEDICINE

## 2025-03-24 PROCEDURE — 82962 GLUCOSE BLOOD TEST: CPT

## 2025-03-24 RX ORDER — FUROSEMIDE 20 MG/1
20 TABLET ORAL DAILY
Qty: 30 TABLET | Refills: 1 | Status: SHIPPED | OUTPATIENT
Start: 2025-03-24

## 2025-03-24 RX ORDER — INSULIN PMP CART,AUT,G6/7,CNTR
EACH SUBCUTANEOUS
COMMUNITY

## 2025-03-24 RX ADMIN — ACETAMINOPHEN 650 MG: 325 TABLET ORAL at 01:49

## 2025-03-24 RX ADMIN — POTASSIUM CHLORIDE 20 MEQ: 1500 TABLET, EXTENDED RELEASE ORAL at 09:12

## 2025-03-24 RX ADMIN — ENOXAPARIN SODIUM 40 MG: 100 INJECTION SUBCUTANEOUS at 09:13

## 2025-03-24 RX ADMIN — LEVOTHYROXINE SODIUM 125 MCG: 0.1 TABLET ORAL at 05:58

## 2025-03-24 RX ADMIN — PANTOPRAZOLE SODIUM 40 MG: 40 TABLET, DELAYED RELEASE ORAL at 05:58

## 2025-03-24 RX ADMIN — ARIPIPRAZOLE 30 MG: 5 TABLET ORAL at 09:13

## 2025-03-24 RX ADMIN — BUSPIRONE HYDROCHLORIDE 15 MG: 5 TABLET ORAL at 09:12

## 2025-03-24 RX ADMIN — SODIUM CHLORIDE, PRESERVATIVE FREE 10 ML: 5 INJECTION INTRAVENOUS at 09:14

## 2025-03-24 RX ADMIN — ASPIRIN 81 MG: 81 TABLET, COATED ORAL at 09:12

## 2025-03-24 RX ADMIN — CARBIDOPA AND LEVODOPA 1 TABLET: 25; 100 TABLET ORAL at 09:12

## 2025-03-24 RX ADMIN — CITALOPRAM HYDROBROMIDE 40 MG: 20 TABLET ORAL at 09:13

## 2025-03-24 RX ADMIN — INSULIN LISPRO 200 UNITS: 100 INJECTION, SOLUTION INTRAVENOUS; SUBCUTANEOUS at 02:17

## 2025-03-24 RX ADMIN — FUROSEMIDE 40 MG: 10 INJECTION, SOLUTION INTRAMUSCULAR; INTRAVENOUS at 09:13

## 2025-03-24 ASSESSMENT — PAIN DESCRIPTION - LOCATION: LOCATION: LEG

## 2025-03-24 ASSESSMENT — PAIN SCALES - GENERAL
PAINLEVEL_OUTOF10: 5
PAINLEVEL_OUTOF10: 2
PAINLEVEL_OUTOF10: 1

## 2025-03-24 ASSESSMENT — PAIN DESCRIPTION - DESCRIPTORS: DESCRIPTORS: TIGHTNESS;PRESSURE

## 2025-03-24 ASSESSMENT — PAIN SCALES - WONG BAKER: WONGBAKER_NUMERICALRESPONSE: NO HURT

## 2025-03-24 ASSESSMENT — PAIN DESCRIPTION - ORIENTATION: ORIENTATION: RIGHT;LEFT

## 2025-03-24 NOTE — PROGRESS NOTES
PCP hospital follow-up transitional care appointment has been scheduled with Dr. Jair Vieira on 3/27/25 4796. [facility] information on AVS for patient resource. Pending patient discharge.

## 2025-03-24 NOTE — PLAN OF CARE
Problem: Chronic Conditions and Co-morbidities  Goal: Patient's chronic conditions and co-morbidity symptoms are monitored and maintained or improved  3/23/2025 2127 by Alan Guadalupe RN  Outcome: Progressing  3/23/2025 0900 by Marily Mccarthy RN  Outcome: Progressing     Problem: Discharge Planning  Goal: Discharge to home or other facility with appropriate resources  3/23/2025 2127 by Alan Guadalupe RN  Outcome: Progressing  3/23/2025 0900 by Marily Mccarthy RN  Outcome: Progressing     Problem: Respiratory - Adult  Goal: Achieves optimal ventilation and oxygenation  3/23/2025 2128 by Jo Lo RT  Outcome: Progressing  3/23/2025 2127 by Alan Guadalupe RN  Outcome: Progressing  3/23/2025 0900 by Marily Mccarthy RN  Outcome: Progressing  3/23/2025 0746 by Jonathan Ruiz, RT  Outcome: Progressing     Problem: Skin/Tissue Integrity - Adult  Goal: Skin integrity remains intact  3/23/2025 2127 by Alan Guadalupe RN  Outcome: Progressing  3/23/2025 0900 by Marily Mccarthy RN  Outcome: Progressing  Goal: Oral mucous membranes remain intact  3/23/2025 2127 by Alan Guadalupe RN  Outcome: Progressing  3/23/2025 0900 by Marily Mccarthy RN  Outcome: Progressing     Problem: Musculoskeletal - Adult  Goal: Return mobility to safest level of function  3/23/2025 2127 by Alan Guadalupe RN  Outcome: Progressing  3/23/2025 0900 by Marily Mccarthy RN  Outcome: Progressing  Goal: Maintain proper alignment of affected body part  3/23/2025 2127 by Alan Guadalupe RN  Outcome: Progressing  3/23/2025 0900 by Marily Mccarthy RN  Outcome: Progressing  Goal: Return ADL status to a safe level of function  3/23/2025 2127 by Alan Guadalupe RN  Outcome: Progressing  3/23/2025 0900 by Marily Mccarthy RN  Outcome: Progressing     Problem: Gastrointestinal - Adult  Goal: Minimal or absence of nausea and vomiting  3/23/2025 2127 by Alan Guadalupe RN  Outcome: Progressing  3/23/2025 0900 by Marily Mccarthy RN  Outcome: Progressing  Goal:

## 2025-03-24 NOTE — PROGRESS NOTES
End of Shift Note    Bedside shift change report given to ADEILA Lynne (oncoming nurse) by Alan Guadalupe RN (offgoing nurse).  Report included the following information SBAR, Kardex, Intake/Output, MAR, Recent Results, Cardiac Rhythm Sinus Rhythm, and Quality Measures    Shift worked:  1198-4325     Shift summary and any significant changes:    No significant changes during this shift. Routine care tolerated. Plan of care ongoing. No other concerns at the time of shift change.      Concerns for physician to address:  None      Zone phone for oncoming shift:          Activity:  Level of Assistance: Independent  Number times ambulated in hallways past shift: 2  Number of times OOB to chair past shift: 4    Cardiac:   Cardiac Monitoring: Yes      Cardiac Rhythm: Sinus rhythm    Access:  Current line(s): PIV     Genitourinary:   Urinary Status: Voiding, Bathroom privileges    Respiratory:   O2 Device: None (Room air)  Chronic home O2 use?: NO  Incentive spirometer at bedside: NO    GI:  Last BM (including prior to admit): 03/23/25  Current diet:  ADULT DIET; Regular; 4 carb choices (60 gm/meal); Low Fat/Low Chol/High Fiber/2 gm Na; 1500 ml  Passing flatus: YES    Pain Management:   Patient states pain is manageable on current regimen: YES    Skin:  José Antonio Scale Score: 19  Interventions: Wound Offloading (Prevention Methods): Repositioning, Pillows, Elevate heels, Turning    Patient Safety:  Fall Risk: Nursing Judgement-Fall Risk High(Add Comments): Yes  Fall Risk Interventions  Nursing Judgement-Fall Risk High(Add Comments): Yes  Toilet Every 2 Hours-In Advance of Need: Yes  Hourly Visual Checks: In bed, Awake  Fall Visual Posted: Socks, Fall sign posted, Armband  Room Door Open: Yes  Alarm On: Bed  Patient Moved Closer to Nursing Station: Yes    Active Consults:   IP CONSULT TO PHARMACY  IP CONSULT TO PHARMACY  IP CONSULT TO DIABETES EDUCATOR    Length of Stay:  Expected LOS: 4  Actual LOS: 2    Alan Guadalupe

## 2025-03-24 NOTE — PROGRESS NOTES
Pharmacy Medication History Review    Medication history obtained by Vilma Hill while patient was in room 3210/01 and was completed based on information available during current patient encounter. Medication history was completed after home meds were reconciled by provider.    Pharmacist Admission Medication Reconciliation Recommendations:            PTA medication list was corrected to the following:   Prior to Admission Medications   Prescriptions Last Dose Informant   ALPRAZolam (XANAX) 2 MG tablet Past Week Self   Sig: Take 1 tablet by mouth. 4 times a daily   ARIPiprazole (ABILIFY) 30 MG tablet Past Week Self   Sig: Take 1 tablet by mouth daily as directed   Alcohol Swabs (ALCOHOL PREP) PADS 3/22/2025 Self   Sig: Use as directed for testing sugars and giving insulin injections up to 5 per day   Blood Glucose Monitoring Suppl (TRUE METRIX METER) PATRICIA Past Week Self   Sig: Test 10 times daily Dx Code: E10.65   DULoxetine (CYMBALTA) 20 MG extended release capsule Past Week Self   Sig: TAKE 1 CAPSULE BY MOUTH EVERY DAY AS DIRECTED   HUMALOG KWIKPEN 100 UNIT/ML SOPN Past Week Self   Sig: Use as directed with insulin pump (easier to draw from pen than from vial for her). Max 66 units/day. Dx E10.65--dispense humalog NOT novolog   Insulin Disposable Pump (OMNIPOD 5 G7 PODS, GEN 5,) MISC Past Week Self   Sig: by Does not apply route   Insulin Pen Needle (B-D ULTRAFINE III SHORT PEN) 31G X 8 MM MISC Unknown Self   Sig: Use to inject insulin QID   Insulin Syringe-Needle U-100 31G X 15/64\" 1 ML MISC Unknown Self   Sig: Tyra to inject insulin QID   Multiple Vitamin (MULTIVITAMIN) TABS Past Week Self   Sig: Take 1 tablet by mouth daily   REXULTI 2 MG TABS tablet Past Week Self   Sig: Take 1 tablet by mouth daily as directed   STIOLTO RESPIMAT 2.5-2.5 MCG/ACT AERS Not Taking Self   Sig: TAKE 2 PUFFS BY MOUTH EVERY DAY   Patient not taking: Reported on 3/24/2025   TRUE METRIX BLOOD GLUCOSE TEST strip Unknown Self   Sig: Test

## 2025-03-24 NOTE — DISCHARGE INSTRUCTIONS
HOSPITALIST DISCHARGE INSTRUCTIONS    NAME: Anette Cannon   :  1970   MRN:  [id number]     Date/Time:  3/24/2025 1:04 PM    ADMIT DATE: 3/22/2025     DISCHARGE DATE: 3/24/2025     DISCHARGE DIAGNOSIS:  Acute respiratory failure with hypoxia POA-88% on room air in ED requiring 2-3 L/m nasal cannula oxygen in ED  Due to acute pulmonary edema POA  Due to suspected acute on chronic diastolic CHF POA-claims to have gained ~30 pounds in past few days, blames it on Lyrica that was started new  PERLA on CKD stage III-baseline creatinine around 1.2, Dcd on 1.3 today   History of COPD not on home oxygen currently but has oxygen for as needed use at baseline  Diabetes type 1 on insulin pump-patient wants to use her pump hospital -sees the endocrinologist Dr. Steven Schmitt  Hypothyroidism  Hyperlipidemia  Hypertension-controlled  Psychiatric disorder with panic attacks and PTSD  GERD  Chronic pain syndrome on weekly buprenorphine  Full code    MEDICATIONS:  As per medication reconciliation  list  It is important that you take the medication exactly as they are prescribed.   Keep your medication in the bottles provided by the pharmacist and keep a list of the medication names, dosages, and times to be taken in your wallet.   Do not take other medications without consulting your doctor.     Pain Management: per above medications    What to do at Home    Recommended diet:  cardiac diet, diabetic diet, and low fat, low cholesterol diet    Recommended activity: activity as tolerated    If you have questions regarding the hospital related prescriptions or hospital related issues please call at .    If you experience any of the following symptoms then please call your primary care physician or return to the emergency room if you cannot get hold of your doctor:  Fever, chills, nausea, vomiting, diarrhea, change in mentation, falling, bleeding, shortness of breath,     Follow Up:  PCP  you are to call and

## 2025-03-24 NOTE — CARE COORDINATION
Care Management Initial Assessment       RUR: 20% High Risk  Readmission? No  1st IM letter given? Yes   1st  letter given: Yes      Initial Assessment: Chart reviewed. CM met with pt at the bedside to introduce self and role. Verified contact information and demographics. Pt resides with significant other in a one level home with 4 LARRY. Pt PCP is Dr. Vieira with last visit being last month. Preferred pharmacy is [facility]. Pt has no hx needing HH/IPR/SNF services. Pt states she is independent with ADL's and an active . ?Pts significant other will transport for discharge.?She?states there are no concerns for her to return home. CM will continue to follow.    Plan A: Home with follow up appts.  Plan B: Home with HH  Full assessment below:          03/24/25 1412   Service Assessment   Patient Orientation Alert and Oriented;Person;Place;Situation;Self   Cognition Alert   History Provided By Patient   Primary Caregiver Self   Support Systems Spouse/Significant Other   Patient's Healthcare Decision Maker is: Legal Next of Kin   PCP Verified by CM Yes   Last Visit to PCP Within last 3 months   Prior Functional Level Independent in ADLs/IADLs   Current Functional Level Independent in ADLs/IADLs   Can patient return to prior living arrangement Yes   Ability to make needs known: Good   Family able to assist with home care needs: Yes   Would you like for me to discuss the discharge plan with any other family members/significant others, and if so, who? No   Financial Resources Medicare   Social/Functional History   Lives With Significant other   Type of Home House   Home Layout One level   Home Equipment None   Active  Yes   Discharge Planning   Type of Residence House   Current Services Prior To Admission None   Patient expects to be discharged to: House   Services At/After Discharge   Transition of Care Consult (CM Consult) N/A   Services At/After Discharge None    Resource Information

## 2025-03-24 NOTE — TELEPHONE ENCOUNTER
Pt LVM stating she is being discharged today and she has been changed from bumex to lasix. Pt confirmed appt for tomorrow AM.

## 2025-03-24 NOTE — DISCHARGE SUMMARY
lidocaine 5 %  Commonly known as: LIDODERM     metoclopramide 5 MG tablet  Commonly known as: REGLAN  TAKE 1 TABLET BY MOUTH TWICE A DAY     mirtazapine 15 MG tablet  Commonly known as: REMERON     Multivitamin Tabs     mupirocin 2 % ointment  Commonly known as: BACTROBAN     naloxone 4 MG/0.1ML Liqd nasal spray     ondansetron 4 MG disintegrating tablet  Commonly known as: ZOFRAN-ODT  Take 1 tablet by mouth 3 times daily as needed for Nausea or Vomiting     pregabalin 50 MG capsule  Commonly known as: LYRICA     * RELION GLUCOSE TEST STRIPS strip  Generic drug: blood glucose test strips  Test 10 times daily due to fluctuating blood sugars     * True Metrix Blood Glucose Test strip  Generic drug: blood glucose test strips  Test 10 times daily E10.65     Rexulti 2 MG Tabs tablet  Generic drug: brexpiprazole     rosuvastatin 40 MG tablet  Commonly known as: CRESTOR     Spiriva Respimat 2.5 MCG/ACT Aers inhaler  Generic drug: tiotropium     Stiolto Respimat 2.5-2.5 MCG/ACT Aers  Generic drug: tiotropium-olodaterol           * This list has 4 medication(s) that are the same as other medications prescribed for you. Read the directions carefully, and ask your doctor or other care provider to review them with you.                      DISPOSITION:    Home with Family: x      Home with HH/PT/OT/RN:    SNF/LTC:    TARAN:    OTHER:            Code status: Full code  Recommended diet: cardiac diet, diabetic diet, and low fat, low cholesterol diet  Recommended activity: activity as tolerated        Follow up with:   PCP : Jair Vieira MD    No follow-up provider specified.        Total time in minutes spent coordinating this discharge (includes going over instructions, follow-up, prescriptions, and preparing report for sign off to her PCP) :  35 minutes

## 2025-03-24 NOTE — TELEPHONE ENCOUNTER
Patient left message Friday evening that sugars staying in 400s  Called back and found is on Omnipod pump.  Asked her to give a SubQ injection and change the pod.  Determined she just started the pump and this was the 3rd pod and it had just been placed that day.  So possible did it incorrectly (?) vs. Pod failure.   She called back a couple hours later that sugars were going down, she had a new pod on, had called [employer] and they were sending her 2 replacement pods.  Told to call back if any issues - did not hear back from her the rest of the weekend.

## 2025-03-24 NOTE — PLAN OF CARE
Problem: Chronic Conditions and Co-morbidities  Goal: Patient's chronic conditions and co-morbidity symptoms are monitored and maintained or improved  3/24/2025 0943 by Ti Bennett RN  Outcome: Progressing  3/23/2025 2127 by Alan Guadalupe RN  Outcome: Progressing     Problem: Discharge Planning  Goal: Discharge to home or other facility with appropriate resources  3/24/2025 0943 by Ti Bennett RN  Outcome: Progressing  3/23/2025 2127 by Alan Guadalupe RN  Outcome: Progressing     Problem: Respiratory - Adult  Goal: Achieves optimal ventilation and oxygenation  3/24/2025 0943 by Ti Bennett RN  Outcome: Progressing  3/23/2025 2128 by Jo Lo, RT  Outcome: Progressing  3/23/2025 2128 by Jo Lo, RT  Outcome: Progressing  3/23/2025 2127 by Alan Guadalupe RN  Outcome: Progressing     Problem: Skin/Tissue Integrity - Adult  Goal: Skin integrity remains intact  3/24/2025 0943 by Ti Bennett RN  Outcome: Progressing  3/23/2025 2127 by Alan Guadalupe RN  Outcome: Progressing  Goal: Oral mucous membranes remain intact  3/24/2025 0943 by Ti Bennett RN  Outcome: Progressing  3/23/2025 2127 by Alan Guadalupe RN  Outcome: Progressing     Problem: Musculoskeletal - Adult  Goal: Return mobility to safest level of function  3/24/2025 0943 by Ti Bennett RN  Outcome: Progressing  3/23/2025 2127 by Alan Guadalupe RN  Outcome: Progressing  Goal: Maintain proper alignment of affected body part  3/24/2025 0943 by Ti Bennett RN  Outcome: Progressing  3/23/2025 2127 by Alan Guadalupe RN  Outcome: Progressing  Goal: Return ADL status to a safe level of function  3/24/2025 0943 by Ti Bennett RN  Outcome: Progressing  3/23/2025 2127 by Alan Guadalupe RN  Outcome: Progressing     Problem: Gastrointestinal - Adult  Goal: Minimal or absence of nausea and vomiting  3/24/2025 0943 by Ti Bennett RN  Outcome: Progressing  3/23/2025 2127 by Alan Guadalupe RN  Outcome:

## 2025-03-25 ENCOUNTER — OFFICE VISIT (OUTPATIENT)
Age: 55
End: 2025-03-25
Payer: MEDICARE

## 2025-03-25 ENCOUNTER — TELEPHONE (OUTPATIENT)
Age: 55
End: 2025-03-25

## 2025-03-25 VITALS
SYSTOLIC BLOOD PRESSURE: 108 MMHG | HEIGHT: 65 IN | HEART RATE: 83 BPM | WEIGHT: 197.2 LBS | DIASTOLIC BLOOD PRESSURE: 61 MMHG | BODY MASS INDEX: 32.86 KG/M2

## 2025-03-25 DIAGNOSIS — R79.89 ELEVATED LFTS: ICD-10-CM

## 2025-03-25 DIAGNOSIS — I10 ESSENTIAL (PRIMARY) HYPERTENSION: ICD-10-CM

## 2025-03-25 DIAGNOSIS — E03.9 ACQUIRED HYPOTHYROIDISM: ICD-10-CM

## 2025-03-25 DIAGNOSIS — E10.65 TYPE 1 DIABETES MELLITUS WITH HYPERGLYCEMIA (HCC): Primary | ICD-10-CM

## 2025-03-25 DIAGNOSIS — E78.2 MIXED HYPERLIPIDEMIA: ICD-10-CM

## 2025-03-25 PROCEDURE — 95251 CONT GLUC MNTR ANALYSIS I&R: CPT | Performed by: INTERNAL MEDICINE

## 2025-03-25 PROCEDURE — 3052F HG A1C>EQUAL 8.0%<EQUAL 9.0%: CPT | Performed by: INTERNAL MEDICINE

## 2025-03-25 PROCEDURE — G2211 COMPLEX E/M VISIT ADD ON: HCPCS | Performed by: INTERNAL MEDICINE

## 2025-03-25 PROCEDURE — 3078F DIAST BP <80 MM HG: CPT | Performed by: INTERNAL MEDICINE

## 2025-03-25 PROCEDURE — 3074F SYST BP LT 130 MM HG: CPT | Performed by: INTERNAL MEDICINE

## 2025-03-25 PROCEDURE — 99214 OFFICE O/P EST MOD 30 MIN: CPT | Performed by: INTERNAL MEDICINE

## 2025-03-25 RX ORDER — ALBUTEROL SULFATE 0.63 MG/3ML
SOLUTION RESPIRATORY (INHALATION)
COMMUNITY

## 2025-03-25 NOTE — PATIENT INSTRUCTIONS
1) Current settings are as follows:  - basal: 12a: 1.2  - Carb ratio: 12a: 10  - sensitivity: 12a: 50  - target: 120-150  - active insulin time: 5 hours    I made your carb ratio more aggressive to help with spikes after eating and this should allow your next A1c to be back down under 8% and ideally closer to 7%.    2) Your TSH (thyroid test) is perfect so I will keep your dose the same of levothyroxine.

## 2025-03-25 NOTE — PROGRESS NOTES
Chief Complaint   Patient presents with    Diabetes     PCP and pharmacy confirmed  Pt consented to use of LELIA     History of Present Illness: Anette Cannon is a 55 y.o. female here for follow up of diabetes.  Weight up 14 lbs since last visit in 2/25.  Review of her dexcom data over the past 30 days shows 47% in range, 32% high, 20% very high, 1% low and <1% very low.    Current settings are as follows:  - basal: 12a: 1.2  - Carb ratio: 12a: 15  - sensitivity: 12a: 50  - target: 120-150  - active insulin time: 5 hours    she has the following indications to conting treatment with Dexcom:  1) she has type 1 diabetes (E10.65) and is on an intensive insulin regimen with an insulin pump  2) she tests her blood sugar 10 times per day and makes treatment decisions off her blood sugar readings and off sensor readings  3) she requires frequent adjustments to her insulin pump settings based on her sensor readings  4) she has benefitted from therapeutic continuous glucose monitoring and I recommend that she continue this  5) she is seen in my office every 1-3 months                History of Present Illness  The patient is a 55-year-old female here for follow-up of diabetes.    She started on the omnipod 5 pump with dexcom about 3 weeks ago.  Her Dexcom G7 sensor has not yet arrived, although the Omnipod has been received. A new sensor was applied this morning, but it may need replacement as it is not functioning optimally and currently her omnipod mehnaz states there is an issue with the sensor but the G7 mehnaz is reading. The sensor applied yesterday morning also failed to read correctly. The Dexcom mehnaz is being used to monitor blood sugar levels. The most recent A1c level was reported as 8.1 during a recent hospital stay. Increased coughing has been experienced since hospital discharge, attributed to a possible cold contracted during the stay. No fever is reported. Carbohydrate intake is being managed effectively but she

## 2025-03-25 NOTE — TELEPHONE ENCOUNTER
I spoke with Anette and Duke, we were able to start the process of pairing her current Dexcom G7 sensor with her Omnipod 5. Pairing should be complete in about 25 minutes and pod should go into Automated: Limited mode for a few more minutes until the pod has enough glucose readings and trend arrow info to switch into Automated mode. Anette will call [facility] if this does not occur and we will trouble shoot from there.

## 2025-03-28 RX ORDER — ACYCLOVIR 400 MG/1
TABLET ORAL
Qty: 9 EACH | Refills: 3 | Status: SHIPPED | OUTPATIENT
Start: 2025-03-28

## 2025-03-28 NOTE — TELEPHONE ENCOUNTER
Pt LVM asking if a 90 day supply of Dexcom G7 sensors could be called in [facility] [address].

## 2025-04-08 ENCOUNTER — PATIENT MESSAGE (OUTPATIENT)
Age: 55
End: 2025-04-08

## 2025-04-08 RX ORDER — INSULIN PMP CART,AUT,G6/7,CNTR
EACH SUBCUTANEOUS
Qty: 45 EACH | Refills: 3 | Status: SHIPPED | OUTPATIENT
Start: 2025-04-08

## 2025-04-13 LAB
EKG ATRIAL RATE: 77 BPM
EKG DIAGNOSIS: NORMAL
EKG P AXIS: 67 DEGREES
EKG P-R INTERVAL: 174 MS
EKG Q-T INTERVAL: 420 MS
EKG QRS DURATION: 84 MS
EKG QTC CALCULATION (BAZETT): 475 MS
EKG R AXIS: 124 DEGREES
EKG T AXIS: 74 DEGREES
EKG VENTRICULAR RATE: 77 BPM

## 2025-04-15 ENCOUNTER — TELEPHONE (OUTPATIENT)
Age: 55
End: 2025-04-15

## 2025-04-15 DIAGNOSIS — E10.65 TYPE 1 DIABETES MELLITUS WITH HYPERGLYCEMIA (HCC): Primary | ICD-10-CM

## 2025-04-15 RX ORDER — INSULIN PMP CART,AUT,G6/7,CNTR
EACH SUBCUTANEOUS
Qty: 15 EACH | Refills: 1 | Status: SHIPPED | OUTPATIENT
Start: 2025-04-15

## 2025-04-15 NOTE — TELEPHONE ENCOUNTER
See the Aug[employer] exchange below.  Can you call [facility] and find out if they received my updated prescription and if I need to sign a form for the updated quantity or will they process the order that was sent electronically as for medical necessity she needs to change every 2 days instead of every 3 days. Thanks!  -------------------------------------------------------------------------------------------------------------------      I'll have Laurel call them to see if we need to do anything else on our end since I sent the updated prescription for this change on 4/8.  Thanks!  ===View-only below this line===      ----- Message -----       From:Anette Cannon       Sent:4/14/2025 11:06 AM EDT         To:Dr. Steven Schmitt MD    Subject:No omnipod Script    Hey DJ I only have six pots left and with me changing them every two days us med[facility] my insurance won't cover it until May 1st and I don't have enough can you please get Evelyn or you to call them and say it's a medical necessity that I get my pods ASAP.  Only 12 days left and that won't take me to May 1st thank you so much

## 2025-04-15 NOTE — TELEPHONE ENCOUNTER
Spoke with yesika Galeana at [facility], and he stated since it is a therapy change, he has to contact pt's insurance. Pharmacy tech stated once he speaks with pt's insurance, he will notify pt. He stated he will ask pt to contact office once he has spoken with her and her insurance. Nothing needs to be done on Dr Schmitt's end. Pt notified via [employer].

## 2025-04-21 ENCOUNTER — TELEPHONE (OUTPATIENT)
Age: 55
End: 2025-04-21

## 2025-04-21 NOTE — TELEPHONE ENCOUNTER
Radha with [facility] ([phone]) left a voicemail at 3:43pm stating that a prior authorization is needed for her omnipod pump supplies and he provided the CMM key of [id number].

## 2025-04-22 NOTE — TELEPHONE ENCOUNTER
Informed Radha from  [facility] of message below. He stated he is not sure why the PA was for syringes and stated he will do some research regarding if the pods require a PA for increased RX. Radha stated he will call back with the information.

## 2025-04-22 NOTE — TELEPHONE ENCOUNTER
The PA attached to this key is for Comfort EZ Insulin Syringe 28G X 1/2\"0.5 ML.   Attempted PA for increase Omnipod pods and received this information:    Information regarding your request  The member recently filled this medication and will be able to return for their next refill according to their plan limits. If dosage has changed, contact the pharmacy to submit the appropriate submission clarification codes (SCC).

## 2025-04-22 NOTE — TELEPHONE ENCOUNTER
Please call Radha at [facility] to inquire about what is needed as I don't think you need to do a PA for syringes.  Have they changed her order at [facility] to allow her to get her pods with a change frequency of every 48 hours?

## 2025-04-24 NOTE — TELEPHONE ENCOUNTER
Radha from [facility] stating pt does not need a PA for syringes nor does she need PA for Omnipod supplies. He stated her next shipment will go out 5/8/25.

## 2025-04-29 RX ORDER — PRAZOSIN HYDROCHLORIDE 1 MG/1
1 CAPSULE ORAL NIGHTLY
COMMUNITY
Start: 2025-04-14

## 2025-04-29 RX ORDER — VORTIOXETINE 10 MG/1
10 TABLET, FILM COATED ORAL DAILY
COMMUNITY
Start: 2025-04-14

## 2025-05-07 ENCOUNTER — OFFICE VISIT (OUTPATIENT)
Age: 55
End: 2025-05-07
Payer: MEDICARE

## 2025-05-07 DIAGNOSIS — G20.C PARKINSONISM, UNSPECIFIED PARKINSONISM TYPE (HCC): Primary | ICD-10-CM

## 2025-05-07 LAB — HBA1C MFR BLD HPLC: 7.7 %

## 2025-05-07 PROCEDURE — 11104 PUNCH BX SKIN SINGLE LESION: CPT | Performed by: NURSE PRACTITIONER

## 2025-05-07 PROCEDURE — 11105 PUNCH BX SKIN EA SEP/ADDL: CPT | Performed by: NURSE PRACTITIONER

## 2025-05-07 NOTE — PROGRESS NOTES
[facility] Clinic  [facility]    OFFICE PROCEDURE NOTE: SYN-ONE Skin Punch Biopsy          PERFORMING PROVIDER: brandan Angeles APRN - NP   PROCEDURE:  Skin Punch Biopsy    CPT Code: 43985, 11105 X 2      ICD-10-CM    1. Parkinsonism, unspecified Parkinsonism type (HCC)  G20.C           Medications/ Supplies:   Skin-punch biopsy kit from [employer]: skin punch biopsy tool, povidone/ iodine prep pad, alcohol prep pad   [x] 0.5%  []1%    []Lidocaine  [x] bupivacaine     []with epinephrine  3 mL syringe, 30G 1/2\" needle    The procedure and potential complications were explained to the patient, and verbal consent was obtained.  The skin was cleansed with the betadine swabs over the:    []Right [x] Left -  Posterior cervical: 3 cm lateral from C7 vertebrae    []Right [x]Left  - Distal thigh: 10 cm above lateral knee     []Right [x]Left  - Distal leg: 10 cm above lateral malleolus          The perimeter of the cleansed areas was infiltrated with lidocaine solution.  A skin punch biopsy was performed at the distal site with the provided biopsy tool, and the biopsy was placed into container labeled distal sample.  The process was repeated at the proximal site and the biopsy was placed into the container labeled proximal sample.  The containers were appropriately labeled with the patient names, lids tightened and placed on ice in the supplied styrofoam box and sent by [employer] to the lab.    The biopsy sites were cleaned with alcohol swabs x 2 then bandages applied.      There were no immediate or obvious complications and the patient did not complain of pain afterwards.         ___________________  brandan Angeles APRN - NP

## 2025-05-08 DIAGNOSIS — G21.19 DRUG-INDUCED PARKINSON'S DISEASE: ICD-10-CM

## 2025-05-08 PROBLEM — M17.11 ARTHRITIS OF RIGHT KNEE: Status: ACTIVE | Noted: 2025-05-08

## 2025-05-09 ENCOUNTER — HOSPITAL ENCOUNTER (OUTPATIENT)
Facility: HOSPITAL | Age: 55
Discharge: HOME | End: 2025-05-12
Payer: MEDICARE

## 2025-05-09 VITALS
BODY MASS INDEX: 30.99 KG/M2 | HEIGHT: 65 IN | WEIGHT: 186 LBS | HEART RATE: 64 BPM | OXYGEN SATURATION: 94 % | DIASTOLIC BLOOD PRESSURE: 74 MMHG | TEMPERATURE: 97.4 F | SYSTOLIC BLOOD PRESSURE: 148 MMHG

## 2025-05-09 LAB
ABO + RH BLD: NORMAL
ANION GAP SERPL CALC-SCNC: 7 MMOL/L (ref 2–12)
APPEARANCE UR: CLEAR
BACTERIA URNS QL MICRO: NEGATIVE /HPF
BILIRUB UR QL: NEGATIVE
BLOOD GROUP ANTIBODIES SERPL: NORMAL
BUN SERPL-MCNC: 27 MG/DL (ref 6–20)
BUN/CREAT SERPL: 18 (ref 12–20)
CALCIUM SERPL-MCNC: 9.1 MG/DL (ref 8.5–10.1)
CHLORIDE SERPL-SCNC: 99 MMOL/L (ref 97–108)
CO2 SERPL-SCNC: 31 MMOL/L (ref 21–32)
COLOR UR: ABNORMAL
CREAT SERPL-MCNC: 1.49 MG/DL (ref 0.55–1.02)
EPITH CASTS URNS QL MICRO: ABNORMAL /LPF
ERYTHROCYTE [DISTWIDTH] IN BLOOD BY AUTOMATED COUNT: 15.5 % (ref 11.5–14.5)
EST. AVERAGE GLUCOSE BLD GHB EST-MCNC: 163 MG/DL
GLUCOSE SERPL-MCNC: 221 MG/DL (ref 65–100)
GLUCOSE UR STRIP.AUTO-MCNC: NEGATIVE MG/DL
HBA1C MFR BLD: 7.3 % (ref 4–5.6)
HCT VFR BLD AUTO: 42.6 % (ref 35–47)
HGB BLD-MCNC: 13.9 G/DL (ref 11.5–16)
HGB UR QL STRIP: NEGATIVE
HYALINE CASTS URNS QL MICRO: ABNORMAL /LPF (ref 0–5)
KETONES UR QL STRIP.AUTO: NEGATIVE MG/DL
LEUKOCYTE ESTERASE UR QL STRIP.AUTO: ABNORMAL
MCH RBC QN AUTO: 29.4 PG (ref 26–34)
MCHC RBC AUTO-ENTMCNC: 32.6 G/DL (ref 30–36.5)
MCV RBC AUTO: 90.3 FL (ref 80–99)
NITRITE UR QL STRIP.AUTO: NEGATIVE
NRBC # BLD: 0 K/UL (ref 0–0.01)
NRBC BLD-RTO: 0 PER 100 WBC
PH UR STRIP: 5.5 (ref 5–8)
PLATELET # BLD AUTO: 223 K/UL (ref 150–400)
PMV BLD AUTO: 10.5 FL (ref 8.9–12.9)
POTASSIUM SERPL-SCNC: 3.8 MMOL/L (ref 3.5–5.1)
PROT UR STRIP-MCNC: NEGATIVE MG/DL
RBC # BLD AUTO: 4.72 M/UL (ref 3.8–5.2)
RBC #/AREA URNS HPF: ABNORMAL /HPF (ref 0–5)
SODIUM SERPL-SCNC: 137 MMOL/L (ref 136–145)
SP GR UR REFRACTOMETRY: 1.01 (ref 1–1.03)
SPECIMEN EXP DATE BLD: NORMAL
URINE CULTURE IF INDICATED: ABNORMAL
UROBILINOGEN UR QL STRIP.AUTO: 0.2 EU/DL (ref 0.2–1)
WBC # BLD AUTO: 6.1 K/UL (ref 3.6–11)
WBC URNS QL MICRO: ABNORMAL /HPF (ref 0–4)

## 2025-05-09 PROCEDURE — 81001 URINALYSIS AUTO W/SCOPE: CPT

## 2025-05-09 PROCEDURE — 86850 RBC ANTIBODY SCREEN: CPT

## 2025-05-09 PROCEDURE — 80048 BASIC METABOLIC PNL TOTAL CA: CPT

## 2025-05-09 PROCEDURE — 86901 BLOOD TYPING SEROLOGIC RH(D): CPT

## 2025-05-09 PROCEDURE — 83036 HEMOGLOBIN GLYCOSYLATED A1C: CPT

## 2025-05-09 PROCEDURE — 85027 COMPLETE CBC AUTOMATED: CPT

## 2025-05-09 PROCEDURE — 86900 BLOOD TYPING SEROLOGIC ABO: CPT

## 2025-05-09 RX ORDER — ATOMOXETINE 40 MG/1
40 CAPSULE ORAL DAILY
COMMUNITY

## 2025-05-09 RX ORDER — OXYCODONE AND ACETAMINOPHEN 10; 325 MG/1; MG/1
1 TABLET ORAL EVERY 6 HOURS PRN
COMMUNITY

## 2025-05-09 RX ORDER — ERYTHROMYCIN 250 MG/1
TABLET, DELAYED RELEASE ORAL
COMMUNITY
Start: 2025-05-08 | End: 2025-05-22

## 2025-05-09 RX ORDER — CARBIDOPA AND LEVODOPA 25; 100 MG/1; MG/1
1 TABLET ORAL 3 TIMES DAILY
Qty: 270 TABLET | Refills: 1 | OUTPATIENT
Start: 2025-05-09

## 2025-05-09 RX ORDER — FUROSEMIDE 20 MG/1
20 TABLET ORAL DAILY
COMMUNITY

## 2025-05-09 RX ORDER — METOCLOPRAMIDE 5 MG/1
5 TABLET ORAL 2 TIMES DAILY
COMMUNITY

## 2025-05-09 RX ORDER — FAMOTIDINE 40 MG/1
40 TABLET, FILM COATED ORAL
COMMUNITY

## 2025-05-09 NOTE — PERIOP NOTE
[address] [address]     MAIN PRE OP             [phone]                                                                                AMBULATORY PRE OP          [phone]    PRE-ADMISSION TESTING    [phone]     Surgery Date:  5/21/25       [facility] staff will call you between 4 and 7pm the day before your surgery with your arrival time. (If your surgery is on a Monday, we will call you the Friday before.)    Call [phone] after 7pm Monday-Friday if you did not receive this call.    INSTRUCTIONS BEFORE YOUR SURGERY   When You  Arrive Arrive at [facility] Patient Access on 1st floor the day of your surgery.  Have your insurance card, photo ID,living will/advanced directive/POA (if applicable),  and any copayment (if needed)   Food   and   Drink NO solid food after midnight the night before surgery. You can drink clear liquids from midnight until ONE hour prior to your arrival at the hospital on the day of your surgery. Clear liquids include:  Water  Apple juice (no sediment)  Carbonated beverages  Black coffee(no cream/milk)  Tea(no cream/milk)  Gatorade    No alcohol (beer, wine, liquor) or marijuana (smoking) 24 hours prior to surgery.   No edibles for 3 days prior to surgery.    Stop smoking cigarettes 14 days before surgery (helps w/healing and breathing).   Medications to   TAKE   Morning of Surgery MEDICATIONS TO TAKE THE MORNING OF SURGERY WITH A SIP OF WATER: XANAX,BUSPIRONE, CARBIDOPA, ERYTHROMYCIN ,REGLAN,ESOMEPRAZOLE , FUROSEMIDE AND LEVOTHYROXINE    You may take these medications, IF NEEDED, the morning of surgery: PERCOCET 4 HOURS PRIOR TO ARRIVAL IF NEEDED.  INHALER IF NEEDED     Ask your surgeon/prescribing doctor for instructions on taking or stopping these medications prior to surgery: INSULIN AND ASPIRIN   Medications to STOP  before surgery Non-Steroidal anti-inflammatory Drugs (NSAID's): for example,  your surgical wound.  Do not smoke - smoking delays wound healing. This may be a good time to stop smoking.  If you have diabetes, it is important for you to manage your blood sugar levels properly before your surgery as well as after your surgery. Poorly managed blood sugar levels slow down wound healing and prevent you from healing completely.        Testing for Staphylococcus aureus on your skin before surgery    Staphylococcus aureus (staph) is a common bacteria that is found on the body. It normally does not cause infection on healthy skin. Before surgery, you will be tested to see if you have staph by swabbing the inside of your nose. When you have an incision with surgery, the goal is to protect that incision from infection. Removal of the staph bacteria before surgery can decrease the risk of a surgical site infection.    If your nose swab is positive for staph you will be called. Your treatment will include 2 steps:  Prescription for Mupirocin ointment to be used in each nostril twice a day for 5 days.  Showering with Chlorhexidine (CHG) liquid soap for 5 days prior to surgery (follow same CHG Shower Instructions as above).    How to use Mupirocin ointment in your nose   the prescription from your pharmacy. You will receive a large tube of ointment which will be big enough for all of your treatments. You will apply this ointment to each nostril 2 times a day for 5 days.  Wash your hands with  gel or soap and water for 20 seconds before using ointment.  Place a pea-sized amount of ointment on a cotton Q-tip.  Apply ointment just inside of each nostril with the Q-tip. Do not push Q-tip or ointment deep inside you nose.  Press your nostrils together and massage for a few seconds.  Wash your hands with  gel or soap and water after you are finished.  Do not get ointment near your eyes. If it gets into your eyes, rinse them with cool water.  If you need to use nasal spray, clean the tip

## 2025-05-10 LAB
BACTERIA SPEC CULT: NORMAL
BACTERIA SPEC CULT: NORMAL
SERVICE CMNT-IMP: NORMAL

## 2025-05-12 NOTE — PERIOP NOTE
PC to pt, full name and  verified, regarding positive nasal cx (MSSA) and need to start Mupirocin ointment BID x 5 days to B nostrils starting today and bathe with CHG soap for 5 days prior to surgery. Pt verbalized understanding of instructions and will start today as recommended. Allergies and pharmacy of choice reviewed. Rx escribed to pt's pharmacy of choice.  PTA medlist updated. Surgeon and PCP notified of positive culture and treatment.     PRESCRIPTION:    MUPIROCIN 2% OINTMENT  QUANTITY:  #22 GRAMS  REFILLS: NONE    Apply 0.25 g (small pea-sized amount) to both nostrils twice a day for five days.    SANTANA KAYE - NP

## 2025-05-12 NOTE — PERIOP NOTE
1.80 - 8.00 K/UL Final    Lymphocytes Absolute 03/22/2025 2.78  0.80 - 3.50 K/UL Final    Monocytes Absolute 03/22/2025 0.61  0.00 - 1.00 K/UL Final    Eosinophils Absolute 03/22/2025 0.20  0.00 - 0.40 K/UL Final    Basophils Absolute 03/22/2025 0.03  0.00 - 0.10 K/UL Final    Immature Granulocytes Absolute 03/22/2025 0.01  0.00 - 0.04 K/UL Final    Differential Type 03/22/2025 AUTOMATED    Final    Sodium 03/22/2025 136  136 - 145 mmol/L Final    Potassium 03/22/2025 3.5  3.5 - 5.1 mmol/L Final    Chloride 03/22/2025 102  97 - 108 mmol/L Final    CO2 03/22/2025 28  21 - 32 mmol/L Final    Anion Gap 03/22/2025 6  2 - 12 mmol/L Final    PLEASE NOTE NEW REFERENCE RANGE    Glucose 03/22/2025 67  65 - 100 mg/dL Final    BUN 03/22/2025 42 (H)  6 - 20 MG/DL Final    Creatinine 03/22/2025 1.78 (H)  0.55 - 1.02 MG/DL Final    BUN/Creatinine Ratio 03/22/2025 24 (H)  12 - 20   Final    Est, Glom Filt Rate 03/22/2025 34 (L)  >60 ml/min/1.73m2 Final    Comment:    Pediatric calculator link: https://www.kidney.org/professionals/kdoqi/gfr_calculatorped     These results are not intended for use in patients <18 years of age.     eGFR results are calculated without a race factor using  the 2021 CKD-EPI equation. Careful clinical correlation is recommended, particularly when comparing to results calculated using previous equations.  The CKD-EPI equation is less accurate in patients with extremes of muscle mass, extra-renal metabolism of creatinine, excessive creatine ingestion, or following therapy that affects renal tubular secretion.      Calcium 03/22/2025 8.6  8.5 - 10.1 MG/DL Final    Total Bilirubin 03/22/2025 0.2  0.2 - 1.0 MG/DL Final    ALT 03/22/2025 32  12 - 78 U/L Final    AST 03/22/2025 43 (H)  15 - 37 U/L Final    Alk Phosphatase 03/22/2025 120 (H)  45 - 117 U/L Final    Total Protein 03/22/2025 7.2  6.4 - 8.2 g/dL Final    Albumin 03/22/2025 3.5  3.5 - 5.0 g/dL Final    Globulin 03/22/2025 3.7  2.0 - 4.0 g/dL Final

## 2025-05-15 NOTE — PROGRESS NOTES
KNEE DISABILITY OSTEOARTHRITIS AND OUTCOME SCORE    Stiffness - The following question concerns the amount of joing stiffness you have experienced during the last week in your knee. Stiffness is a sensation of restriction or slowness in the ease with which you move your knee joint.  How severe is your knee stiffness after first wakening in the morning?: (Patient-Rptd) 4        Pain - What amount of knee pain have you experienced the last week during the following activities?  Twisting/pivoting on your knee: (Patient-Rptd) 4  Straightening knee fully: (Patient-Rptd) 4  Going up or down stairs: (Patient-Rptd) 4  Standing upright: (Patient-Rptd) 4        Function - Please indicate the degree of difficulty you have experienced in the last week due to your knee.  Rising from sitting: (Patient-Rptd) 4  Bending to floor/ an object: (Patient-Rptd) 3        Raw Score  Jr MARIANELA. Knee Survey Score: (Patient-Rptd) 27  KOOS JR Total Interval Score (0-100 Scale): (Patient-Rptd) 8.291      PROMIS Questions    In general, would you say your health is:: (Patient-Rptd) 3    In general, would you say your quality of life is:: (Patient-Rptd) 2    In general, how would you rate your physical health?: (Patient-Rptd) 2    In general, how would you rate your mental health, including your mood and your ability to think?: (Patient-Rptd) 2    To what extent are you able to carry out your everyday physical activities such as walking, climbing stairs, carrying groceries, or moving a chair?: (Patient-Rptd) 4    In the past 7 days how often have you been bothered by emotional problems such as feeling anxious, depressed or irritable?: (Patient-Rptd) 4    In the past 7 days how would you rate your fatigue on average?: (Patient-Rptd) 3    In the past 7 days how would you rate your pain on average?: (Patient-Rptd) 9    In general, please rate how well you carry out your usual social activities and roles. (This includes activities at home, at

## 2025-05-19 RX ORDER — ATOMOXETINE 10 MG/1
40 CAPSULE ORAL DAILY
Status: CANCELLED | OUTPATIENT
Start: 2025-05-19

## 2025-05-19 RX ORDER — SODIUM CHLORIDE 9 MG/ML
INJECTION, SOLUTION INTRAVENOUS CONTINUOUS
Status: CANCELLED | OUTPATIENT
Start: 2025-05-19

## 2025-05-19 RX ORDER — FAMOTIDINE 20 MG/1
20 TABLET, FILM COATED ORAL 2 TIMES DAILY
Status: CANCELLED | OUTPATIENT
Start: 2025-05-19

## 2025-05-19 RX ORDER — OXYCODONE HYDROCHLORIDE 5 MG/1
5 TABLET ORAL EVERY 4 HOURS PRN
Refills: 0 | Status: CANCELLED | OUTPATIENT
Start: 2025-05-19

## 2025-05-19 RX ORDER — BISACODYL 10 MG
10 SUPPOSITORY, RECTAL RECTAL DAILY PRN
Status: CANCELLED | OUTPATIENT
Start: 2025-05-19

## 2025-05-19 RX ORDER — HYDROMORPHONE HYDROCHLORIDE 1 MG/ML
0.5 INJECTION, SOLUTION INTRAMUSCULAR; INTRAVENOUS; SUBCUTANEOUS
Refills: 0 | Status: CANCELLED | OUTPATIENT
Start: 2025-05-19

## 2025-05-19 RX ORDER — CARBIDOPA AND LEVODOPA 25; 100 MG/1; MG/1
1 TABLET ORAL 3 TIMES DAILY
Status: CANCELLED | OUTPATIENT
Start: 2025-05-19

## 2025-05-19 RX ORDER — ASPIRIN 81 MG/1
81 TABLET ORAL 2 TIMES DAILY
Status: CANCELLED | OUTPATIENT
Start: 2025-05-20

## 2025-05-19 RX ORDER — 0.9 % SODIUM CHLORIDE 0.9 %
500 INTRAVENOUS SOLUTION INTRAVENOUS PRN
Status: CANCELLED | OUTPATIENT
Start: 2025-05-19

## 2025-05-19 RX ORDER — INSULIN LISPRO 100 [IU]/ML
0-8 INJECTION, SOLUTION INTRAVENOUS; SUBCUTANEOUS
Status: CANCELLED | OUTPATIENT
Start: 2025-05-19

## 2025-05-19 RX ORDER — NALOXONE HYDROCHLORIDE 0.4 MG/ML
0.4 INJECTION, SOLUTION INTRAMUSCULAR; INTRAVENOUS; SUBCUTANEOUS PRN
Status: CANCELLED | OUTPATIENT
Start: 2025-05-19

## 2025-05-19 RX ORDER — DEXTROSE MONOHYDRATE 100 MG/ML
INJECTION, SOLUTION INTRAVENOUS CONTINUOUS PRN
Status: CANCELLED | OUTPATIENT
Start: 2025-05-19

## 2025-05-19 RX ORDER — POLYETHYLENE GLYCOL 3350 17 G/17G
17 POWDER, FOR SOLUTION ORAL DAILY
Status: CANCELLED | OUTPATIENT
Start: 2025-05-19

## 2025-05-19 RX ORDER — SODIUM CHLORIDE 0.9 % (FLUSH) 0.9 %
5-40 SYRINGE (ML) INJECTION EVERY 12 HOURS SCHEDULED
Status: CANCELLED | OUTPATIENT
Start: 2025-05-19

## 2025-05-19 RX ORDER — SODIUM CHLORIDE 0.9 % (FLUSH) 0.9 %
5-40 SYRINGE (ML) INJECTION PRN
Status: CANCELLED | OUTPATIENT
Start: 2025-05-19

## 2025-05-19 RX ORDER — ROSUVASTATIN CALCIUM 40 MG/1
40 TABLET, COATED ORAL
Status: CANCELLED | OUTPATIENT
Start: 2025-05-19

## 2025-05-19 RX ORDER — SENNA AND DOCUSATE SODIUM 50; 8.6 MG/1; MG/1
1 TABLET, FILM COATED ORAL 2 TIMES DAILY
Status: CANCELLED | OUTPATIENT
Start: 2025-05-19

## 2025-05-19 RX ORDER — FUROSEMIDE 20 MG/1
20 TABLET ORAL DAILY
Status: CANCELLED | OUTPATIENT
Start: 2025-05-19

## 2025-05-19 RX ORDER — SODIUM CHLORIDE 9 MG/ML
INJECTION, SOLUTION INTRAVENOUS PRN
Status: CANCELLED | OUTPATIENT
Start: 2025-05-19

## 2025-05-19 RX ORDER — MIRTAZAPINE 15 MG/1
15 TABLET, FILM COATED ORAL NIGHTLY
Status: CANCELLED | OUTPATIENT
Start: 2025-05-19

## 2025-05-19 RX ORDER — LEVOTHYROXINE SODIUM 125 UG/1
125 TABLET ORAL DAILY
Status: CANCELLED | OUTPATIENT
Start: 2025-05-19

## 2025-05-19 RX ORDER — ONDANSETRON 4 MG/1
4 TABLET, ORALLY DISINTEGRATING ORAL EVERY 8 HOURS PRN
Status: CANCELLED | OUTPATIENT
Start: 2025-05-19

## 2025-05-19 RX ORDER — HYDROXYZINE HYDROCHLORIDE 10 MG/1
10 TABLET, FILM COATED ORAL EVERY 8 HOURS PRN
Status: CANCELLED | OUTPATIENT
Start: 2025-05-19

## 2025-05-19 RX ORDER — ALPRAZOLAM 1 MG/1
2 TABLET ORAL 3 TIMES DAILY PRN
Status: CANCELLED | OUTPATIENT
Start: 2025-05-19

## 2025-05-19 RX ORDER — ONDANSETRON 2 MG/ML
4 INJECTION INTRAMUSCULAR; INTRAVENOUS EVERY 6 HOURS PRN
Status: CANCELLED | OUTPATIENT
Start: 2025-05-19

## 2025-05-20 ENCOUNTER — ANESTHESIA EVENT (OUTPATIENT)
Facility: HOSPITAL | Age: 55
End: 2025-05-20
Payer: MEDICARE

## 2025-05-20 NOTE — DISCHARGE INSTRUCTIONS
chills  increased redness, tenderness, swelling or drainage from incision  increased pain during activity or rest  Warning signs of a blood clot in your leg:  increased pain in your calf  tenderness or redness  increased swelling or knee, calf, ankle or foot    Call [phone] after 5pm or on a weekend. The on call physician will return your phone call  Call your Primary Care Doctor for:   Concerns about your medical conditions such as diabetes, high blood pressure, asthma, congestive heart failure  Blood sugars greater than 180  Persistent headache or dizziness  Coughing or congestion  Constipation or diarrhea  Burning when you go to the bathroom  Abnormal heart rate (fast or  slow)      Call 911 and go to the nearest hospital for:   Sudden increased shortness of breath  Sudden onset of chest pain  Difficulty breathing  Localized chest pain with coughing or taking a deep breath

## 2025-05-21 ENCOUNTER — ANESTHESIA (OUTPATIENT)
Facility: HOSPITAL | Age: 55
End: 2025-05-21
Payer: MEDICARE

## 2025-05-21 ENCOUNTER — HOSPITAL ENCOUNTER (OUTPATIENT)
Facility: HOSPITAL | Age: 55
Setting detail: OBSERVATION
Discharge: HOME | End: 2025-05-21
Attending: ORTHOPAEDIC SURGERY | Admitting: ORTHOPAEDIC SURGERY
Payer: MEDICARE

## 2025-05-21 VITALS
OXYGEN SATURATION: 95 % | HEART RATE: 75 BPM | TEMPERATURE: 98.9 F | DIASTOLIC BLOOD PRESSURE: 85 MMHG | BODY MASS INDEX: 30.32 KG/M2 | WEIGHT: 182 LBS | RESPIRATION RATE: 14 BRPM | HEIGHT: 65 IN | SYSTOLIC BLOOD PRESSURE: 146 MMHG

## 2025-05-21 DIAGNOSIS — Z96.651 S/P TOTAL KNEE ARTHROPLASTY, RIGHT: Primary | ICD-10-CM

## 2025-05-21 PROBLEM — M17.11 OSTEOARTHRITIS OF RIGHT KNEE, UNSPECIFIED OSTEOARTHRITIS TYPE: Status: ACTIVE | Noted: 2025-05-21

## 2025-05-21 LAB
GLUCOSE BLD STRIP.AUTO-MCNC: 141 MG/DL (ref 65–117)
GLUCOSE BLD STRIP.AUTO-MCNC: 154 MG/DL (ref 65–117)
SERVICE CMNT-IMP: ABNORMAL
SERVICE CMNT-IMP: ABNORMAL

## 2025-05-21 PROCEDURE — 3600000014 HC SURGERY LEVEL 4 ADDTL 15MIN: Performed by: ORTHOPAEDIC SURGERY

## 2025-05-21 PROCEDURE — 6370000000 HC RX 637 (ALT 250 FOR IP): Performed by: PHYSICIAN ASSISTANT

## 2025-05-21 PROCEDURE — G0378 HOSPITAL OBSERVATION PER HR: HCPCS

## 2025-05-21 PROCEDURE — 2720000010 HC SURG SUPPLY STERILE: Performed by: ORTHOPAEDIC SURGERY

## 2025-05-21 PROCEDURE — C1713 ANCHOR/SCREW BN/BN,TIS/BN: HCPCS | Performed by: ORTHOPAEDIC SURGERY

## 2025-05-21 PROCEDURE — 6360000002 HC RX W HCPCS: Performed by: NURSE ANESTHETIST, CERTIFIED REGISTERED

## 2025-05-21 PROCEDURE — 6360000002 HC RX W HCPCS: Performed by: ORTHOPAEDIC SURGERY

## 2025-05-21 PROCEDURE — 3600000004 HC SURGERY LEVEL 4 BASE: Performed by: ORTHOPAEDIC SURGERY

## 2025-05-21 PROCEDURE — 2500000003 HC RX 250 WO HCPCS: Performed by: PHYSICIAN ASSISTANT

## 2025-05-21 PROCEDURE — 2709999900 HC NON-CHARGEABLE SUPPLY: Performed by: ORTHOPAEDIC SURGERY

## 2025-05-21 PROCEDURE — 6360000002 HC RX W HCPCS: Performed by: PHYSICIAN ASSISTANT

## 2025-05-21 PROCEDURE — 7100000001 HC PACU RECOVERY - ADDTL 15 MIN: Performed by: ORTHOPAEDIC SURGERY

## 2025-05-21 PROCEDURE — 6360000002 HC RX W HCPCS: Performed by: STUDENT IN AN ORGANIZED HEALTH CARE EDUCATION/TRAINING PROGRAM

## 2025-05-21 PROCEDURE — 6370000000 HC RX 637 (ALT 250 FOR IP): Performed by: STUDENT IN AN ORGANIZED HEALTH CARE EDUCATION/TRAINING PROGRAM

## 2025-05-21 PROCEDURE — 97530 THERAPEUTIC ACTIVITIES: CPT

## 2025-05-21 PROCEDURE — 2580000003 HC RX 258: Performed by: STUDENT IN AN ORGANIZED HEALTH CARE EDUCATION/TRAINING PROGRAM

## 2025-05-21 PROCEDURE — 2580000003 HC RX 258: Performed by: NURSE ANESTHETIST, CERTIFIED REGISTERED

## 2025-05-21 PROCEDURE — 2500000003 HC RX 250 WO HCPCS: Performed by: NURSE ANESTHETIST, CERTIFIED REGISTERED

## 2025-05-21 PROCEDURE — 7100000000 HC PACU RECOVERY - FIRST 15 MIN: Performed by: ORTHOPAEDIC SURGERY

## 2025-05-21 PROCEDURE — 6360000002 HC RX W HCPCS: Performed by: ANESTHESIOLOGY

## 2025-05-21 PROCEDURE — C1776 JOINT DEVICE (IMPLANTABLE): HCPCS | Performed by: ORTHOPAEDIC SURGERY

## 2025-05-21 PROCEDURE — 2500000003 HC RX 250 WO HCPCS: Performed by: ORTHOPAEDIC SURGERY

## 2025-05-21 PROCEDURE — 82962 GLUCOSE BLOOD TEST: CPT

## 2025-05-21 PROCEDURE — 7100000011 HC PHASE II RECOVERY - ADDTL 15 MIN: Performed by: ORTHOPAEDIC SURGERY

## 2025-05-21 PROCEDURE — 97116 GAIT TRAINING THERAPY: CPT

## 2025-05-21 PROCEDURE — 7100000010 HC PHASE II RECOVERY - FIRST 15 MIN: Performed by: ORTHOPAEDIC SURGERY

## 2025-05-21 PROCEDURE — 2580000003 HC RX 258: Performed by: ORTHOPAEDIC SURGERY

## 2025-05-21 PROCEDURE — 3700000001 HC ADD 15 MINUTES (ANESTHESIA): Performed by: ORTHOPAEDIC SURGERY

## 2025-05-21 PROCEDURE — 3700000000 HC ANESTHESIA ATTENDED CARE: Performed by: ORTHOPAEDIC SURGERY

## 2025-05-21 PROCEDURE — 97161 PT EVAL LOW COMPLEX 20 MIN: CPT

## 2025-05-21 DEVICE — ATTUNE KNEE SYSTEM TIBIAL INSERT FIXED BEARING MEDIAL STABILIZED RIGHT AOX 5, 6MM
Type: IMPLANTABLE DEVICE | Site: KNEE | Status: FUNCTIONAL
Brand: ATTUNE

## 2025-05-21 DEVICE — KNEE K2 TOT HEMI ADV CMTLS IMPL CAPPED SYNTHES: Type: IMPLANTABLE DEVICE | Site: KNEE | Status: FUNCTIONAL

## 2025-05-21 DEVICE — SMARTSET GHV GENTAMICIN HIGH VISCOSITY BONE CEMENT 40G
Type: IMPLANTABLE DEVICE | Site: KNEE | Status: FUNCTIONAL
Brand: SMARTSET

## 2025-05-21 DEVICE — ATTUNE KNEE SYSTEM TIBIAL BASE AFFIXIUM FIXED BEARING SIZE 4
Type: IMPLANTABLE DEVICE | Site: KNEE | Status: FUNCTIONAL
Brand: ATTUNE AFFIXIUM

## 2025-05-21 DEVICE — ATTUNE KNEE SYSTEM FEMORAL POROCOAT CRUCIATE RETAINING NARROW SIZE 5N RIGHT CEMENTLESS
Type: IMPLANTABLE DEVICE | Site: KNEE | Status: FUNCTIONAL
Brand: ATTUNE

## 2025-05-21 RX ORDER — OXYCODONE HYDROCHLORIDE 5 MG/1
5 TABLET ORAL EVERY 4 HOURS PRN
Qty: 42 TABLET | Refills: 0 | Status: SHIPPED | OUTPATIENT
Start: 2025-05-21 | End: 2025-05-28

## 2025-05-21 RX ORDER — SODIUM CHLORIDE 0.9 % (FLUSH) 0.9 %
5-40 SYRINGE (ML) INJECTION EVERY 12 HOURS SCHEDULED
Status: DISCONTINUED | OUTPATIENT
Start: 2025-05-21 | End: 2025-05-21 | Stop reason: HOSPADM

## 2025-05-21 RX ORDER — ACETAMINOPHEN 325 MG/1
650 TABLET ORAL EVERY 6 HOURS
Status: DISCONTINUED | OUTPATIENT
Start: 2025-05-21 | End: 2025-05-21 | Stop reason: HOSPADM

## 2025-05-21 RX ORDER — SODIUM CHLORIDE, SODIUM LACTATE, POTASSIUM CHLORIDE, CALCIUM CHLORIDE 600; 310; 30; 20 MG/100ML; MG/100ML; MG/100ML; MG/100ML
INJECTION, SOLUTION INTRAVENOUS CONTINUOUS
Status: DISCONTINUED | OUTPATIENT
Start: 2025-05-21 | End: 2025-05-21 | Stop reason: HOSPADM

## 2025-05-21 RX ORDER — SODIUM CHLORIDE 0.9 % (FLUSH) 0.9 %
5-40 SYRINGE (ML) INJECTION PRN
Status: DISCONTINUED | OUTPATIENT
Start: 2025-05-21 | End: 2025-05-21 | Stop reason: HOSPADM

## 2025-05-21 RX ORDER — DEXAMETHASONE SODIUM PHOSPHATE 4 MG/ML
INJECTION, SOLUTION INTRA-ARTICULAR; INTRALESIONAL; INTRAMUSCULAR; INTRAVENOUS; SOFT TISSUE
Status: DISCONTINUED | OUTPATIENT
Start: 2025-05-21 | End: 2025-05-21 | Stop reason: SDUPTHER

## 2025-05-21 RX ORDER — TRANEXAMIC ACID 100 MG/ML
INJECTION, SOLUTION INTRAVENOUS
Status: DISCONTINUED | OUTPATIENT
Start: 2025-05-21 | End: 2025-05-21 | Stop reason: SDUPTHER

## 2025-05-21 RX ORDER — LABETALOL HYDROCHLORIDE 5 MG/ML
10 INJECTION, SOLUTION INTRAVENOUS
Status: DISCONTINUED | OUTPATIENT
Start: 2025-05-21 | End: 2025-05-21 | Stop reason: HOSPADM

## 2025-05-21 RX ORDER — PROCHLORPERAZINE EDISYLATE 5 MG/ML
5 INJECTION INTRAMUSCULAR; INTRAVENOUS
Status: DISCONTINUED | OUTPATIENT
Start: 2025-05-21 | End: 2025-05-21 | Stop reason: HOSPADM

## 2025-05-21 RX ORDER — KETOROLAC TROMETHAMINE 30 MG/ML
15 INJECTION, SOLUTION INTRAMUSCULAR; INTRAVENOUS EVERY 6 HOURS
Status: DISCONTINUED | OUTPATIENT
Start: 2025-05-21 | End: 2025-05-21 | Stop reason: HOSPADM

## 2025-05-21 RX ORDER — TRANEXAMIC ACID 100 MG/ML
INJECTION, SOLUTION INTRAVENOUS PRN
Status: DISCONTINUED | OUTPATIENT
Start: 2025-05-21 | End: 2025-05-21 | Stop reason: HOSPADM

## 2025-05-21 RX ORDER — SODIUM CHLORIDE 9 MG/ML
INJECTION, SOLUTION INTRAVENOUS PRN
Status: DISCONTINUED | OUTPATIENT
Start: 2025-05-21 | End: 2025-05-21 | Stop reason: HOSPADM

## 2025-05-21 RX ORDER — ONDANSETRON 2 MG/ML
4 INJECTION INTRAMUSCULAR; INTRAVENOUS
Status: DISCONTINUED | OUTPATIENT
Start: 2025-05-21 | End: 2025-05-21 | Stop reason: HOSPADM

## 2025-05-21 RX ORDER — FENTANYL CITRATE 50 UG/ML
100 INJECTION, SOLUTION INTRAMUSCULAR; INTRAVENOUS
Status: DISCONTINUED | OUTPATIENT
Start: 2025-05-21 | End: 2025-05-21 | Stop reason: HOSPADM

## 2025-05-21 RX ORDER — OXYCODONE HYDROCHLORIDE 5 MG/1
5 TABLET ORAL
Status: COMPLETED | OUTPATIENT
Start: 2025-05-21 | End: 2025-05-21

## 2025-05-21 RX ORDER — SENNA AND DOCUSATE SODIUM 50; 8.6 MG/1; MG/1
1 TABLET, FILM COATED ORAL 2 TIMES DAILY
Qty: 60 TABLET | Refills: 0 | Status: SHIPPED | OUTPATIENT
Start: 2025-05-21

## 2025-05-21 RX ORDER — CEFAZOLIN SODIUM 1 G/3ML
INJECTION, POWDER, FOR SOLUTION INTRAMUSCULAR; INTRAVENOUS
Status: DISCONTINUED
Start: 2025-05-21 | End: 2025-05-21 | Stop reason: HOSPADM

## 2025-05-21 RX ORDER — POLYETHYLENE GLYCOL 3350 17 G/17G
17 POWDER, FOR SOLUTION ORAL DAILY
Qty: 7 EACH | Refills: 0 | Status: SHIPPED | OUTPATIENT
Start: 2025-05-21 | End: 2025-06-20

## 2025-05-21 RX ORDER — ACETAMINOPHEN 500 MG
1000 TABLET ORAL ONCE
Status: DISCONTINUED | OUTPATIENT
Start: 2025-05-21 | End: 2025-05-21 | Stop reason: HOSPADM

## 2025-05-21 RX ORDER — PROPOFOL 10 MG/ML
INJECTION, EMULSION INTRAVENOUS
Status: DISCONTINUED | OUTPATIENT
Start: 2025-05-21 | End: 2025-05-21 | Stop reason: SDUPTHER

## 2025-05-21 RX ORDER — ASPIRIN 81 MG/1
81 TABLET ORAL 2 TIMES DAILY
Qty: 60 TABLET | Refills: 0 | Status: SHIPPED | OUTPATIENT
Start: 2025-05-21 | End: 2025-06-20

## 2025-05-21 RX ORDER — FENTANYL CITRATE 50 UG/ML
25 INJECTION, SOLUTION INTRAMUSCULAR; INTRAVENOUS EVERY 5 MIN PRN
Status: COMPLETED | OUTPATIENT
Start: 2025-05-21 | End: 2025-05-21

## 2025-05-21 RX ORDER — HYDRALAZINE HYDROCHLORIDE 20 MG/ML
10 INJECTION INTRAMUSCULAR; INTRAVENOUS
Status: DISCONTINUED | OUTPATIENT
Start: 2025-05-21 | End: 2025-05-21 | Stop reason: HOSPADM

## 2025-05-21 RX ORDER — ONDANSETRON 2 MG/ML
INJECTION INTRAMUSCULAR; INTRAVENOUS
Status: DISCONTINUED | OUTPATIENT
Start: 2025-05-21 | End: 2025-05-21 | Stop reason: SDUPTHER

## 2025-05-21 RX ORDER — NALOXONE HYDROCHLORIDE 0.4 MG/ML
INJECTION, SOLUTION INTRAMUSCULAR; INTRAVENOUS; SUBCUTANEOUS PRN
Status: DISCONTINUED | OUTPATIENT
Start: 2025-05-21 | End: 2025-05-21 | Stop reason: HOSPADM

## 2025-05-21 RX ORDER — ACETAMINOPHEN 500 MG
1000 TABLET ORAL ONCE
Status: DISCONTINUED | OUTPATIENT
Start: 2025-05-21 | End: 2025-05-21 | Stop reason: SDUPTHER

## 2025-05-21 RX ORDER — MIDAZOLAM HYDROCHLORIDE 2 MG/2ML
2 INJECTION, SOLUTION INTRAMUSCULAR; INTRAVENOUS PRN
Status: DISCONTINUED | OUTPATIENT
Start: 2025-05-21 | End: 2025-05-21 | Stop reason: HOSPADM

## 2025-05-21 RX ORDER — LIDOCAINE HYDROCHLORIDE 10 MG/ML
1 INJECTION, SOLUTION EPIDURAL; INFILTRATION; INTRACAUDAL; PERINEURAL
Status: DISCONTINUED | OUTPATIENT
Start: 2025-05-21 | End: 2025-05-21 | Stop reason: HOSPADM

## 2025-05-21 RX ORDER — HYDROMORPHONE HYDROCHLORIDE 1 MG/ML
0.5 INJECTION, SOLUTION INTRAMUSCULAR; INTRAVENOUS; SUBCUTANEOUS EVERY 5 MIN PRN
Status: DISCONTINUED | OUTPATIENT
Start: 2025-05-21 | End: 2025-05-21 | Stop reason: HOSPADM

## 2025-05-21 RX ORDER — LIDOCAINE HYDROCHLORIDE 10 MG/ML
INJECTION, SOLUTION INFILTRATION; PERINEURAL PRN
Status: DISCONTINUED | OUTPATIENT
Start: 2025-05-21 | End: 2025-05-21 | Stop reason: HOSPADM

## 2025-05-21 RX ORDER — MIDAZOLAM HYDROCHLORIDE 1 MG/ML
INJECTION, SOLUTION INTRAMUSCULAR; INTRAVENOUS
Status: DISCONTINUED | OUTPATIENT
Start: 2025-05-21 | End: 2025-05-21 | Stop reason: SDUPTHER

## 2025-05-21 RX ADMIN — FENTANYL CITRATE 25 MCG: 50 INJECTION INTRAMUSCULAR; INTRAVENOUS at 12:20

## 2025-05-21 RX ADMIN — PROPOFOL 50 MG: 10 INJECTION, EMULSION INTRAVENOUS at 09:40

## 2025-05-21 RX ADMIN — DEXAMETHASONE SODIUM PHOSPHATE 4 MG: 4 INJECTION INTRA-ARTICULAR; INTRALESIONAL; INTRAMUSCULAR; INTRAVENOUS; SOFT TISSUE at 09:46

## 2025-05-21 RX ADMIN — WATER 2000 MG: 1 INJECTION INTRAMUSCULAR; INTRAVENOUS; SUBCUTANEOUS at 09:50

## 2025-05-21 RX ADMIN — ACETAMINOPHEN 650 MG: 325 TABLET ORAL at 14:53

## 2025-05-21 RX ADMIN — PHENYLEPHRINE HYDROCHLORIDE 40 MCG/MIN: 10 INJECTION INTRAVENOUS at 09:55

## 2025-05-21 RX ADMIN — FENTANYL CITRATE 25 MCG: 50 INJECTION INTRAMUSCULAR; INTRAVENOUS at 12:15

## 2025-05-21 RX ADMIN — SODIUM CHLORIDE, SODIUM LACTATE, POTASSIUM CHLORIDE, AND CALCIUM CHLORIDE: .6; .31; .03; .02 INJECTION, SOLUTION INTRAVENOUS at 09:07

## 2025-05-21 RX ADMIN — OXYCODONE 5 MG: 5 TABLET ORAL at 12:57

## 2025-05-21 RX ADMIN — TRANEXAMIC ACID 1000 MG: 100 INJECTION, SOLUTION INTRAVENOUS at 10:46

## 2025-05-21 RX ADMIN — FENTANYL CITRATE 25 MCG: 50 INJECTION INTRAMUSCULAR; INTRAVENOUS at 12:28

## 2025-05-21 RX ADMIN — MIDAZOLAM 2 MG: 1 INJECTION INTRAMUSCULAR; INTRAVENOUS at 09:34

## 2025-05-21 RX ADMIN — KETOROLAC TROMETHAMINE 15 MG: 30 INJECTION, SOLUTION INTRAMUSCULAR; INTRAVENOUS at 14:25

## 2025-05-21 RX ADMIN — PROPOFOL 50 MCG/KG/MIN: 10 INJECTION, EMULSION INTRAVENOUS at 09:47

## 2025-05-21 RX ADMIN — WATER 2000 MG: 1 INJECTION INTRAMUSCULAR; INTRAVENOUS; SUBCUTANEOUS at 15:45

## 2025-05-21 RX ADMIN — ONDANSETRON 4 MG: 2 INJECTION, SOLUTION INTRAMUSCULAR; INTRAVENOUS at 10:50

## 2025-05-21 RX ADMIN — MEPIVACAINE HYDROCHLORIDE 50 MG: 15 INJECTION, SOLUTION EPIDURAL; INFILTRATION at 09:45

## 2025-05-21 RX ADMIN — FENTANYL CITRATE 25 MCG: 50 INJECTION INTRAMUSCULAR; INTRAVENOUS at 12:10

## 2025-05-21 RX ADMIN — TRANEXAMIC ACID 1000 MG: 100 INJECTION, SOLUTION INTRAVENOUS at 09:55

## 2025-05-21 ASSESSMENT — PAIN SCALES - GENERAL
PAINLEVEL_OUTOF10: 5
PAINLEVEL_OUTOF10: 6
PAINLEVEL_OUTOF10: 5

## 2025-05-21 ASSESSMENT — PAIN DESCRIPTION - ORIENTATION
ORIENTATION: RIGHT

## 2025-05-21 ASSESSMENT — PAIN DESCRIPTION - DESCRIPTORS
DESCRIPTORS: ACHING
DESCRIPTORS: SORE
DESCRIPTORS: ACHING

## 2025-05-21 ASSESSMENT — PAIN DESCRIPTION - LOCATION
LOCATION: KNEE

## 2025-05-21 ASSESSMENT — COPD QUESTIONNAIRES: CAT_SEVERITY: MODERATE

## 2025-05-21 ASSESSMENT — PAIN - FUNCTIONAL ASSESSMENT: PAIN_FUNCTIONAL_ASSESSMENT: 0-10

## 2025-05-21 NOTE — FLOWSHEET NOTE
05/21/25 1002   Family Communication    Relationship to Patient Partner    Phone Number Duke Perez [phone]   Family/Significant Other Update Called   Delivery Origin Nurse   Message Disposition Family not present - message held until family returns   Update Given   (no answer)   Family Communication   Family Update Message Procedure started

## 2025-05-21 NOTE — OP NOTE
comorbidities.      Intra-operative ROM revealed 8 degrees flexion contracture and 6 degrees varus.   ROM with trials in place revealed 0 degrees flexion contracture and 3 degrees varus.    DESCRIPTION OF PROCEDURE:DESCRIPTION OF PROCEDURE: Epidural/spinal anesthesia was initiated. Two grams of cefazolin were administered within 30 minutes of incision.  The right lower extremity was prepped and draped in the usual sterile fashion. The skin was covered with Ioban occlusive dressing. A tourniquet was not used.    A midline skin incision was made with a 10 blade and small flaps were elevated. A SUBVASTUS arthrotomy was made to the knee. I released the ACL and menisci and performed a limited medial release.  I then obtained all anatomic landmarks using the [employer] system. The tibia was subluxed and I carried out a tibial resection with approximately 2-3 mm from the low side . The meniscal remnants were removed.  I then placed the tensor  and took the knee through a range of motion. I utlized the balance curve to modify our femoral cuts.  Anterior, posterior, and chamfer cuts were carried out using the [employer] robot. I then prepared the femur for the planned size and drilled lug holes.   The tibial was then sized and correct rotation was identified using the medial 1/3 of the tibial tubercle as a landmark. The tibia was prepared using a drill followed by a keel punch.    Trials were placed. The patella was denervated and lateral facetectomy performed. Lug holes were drilled and a trial was fitted. The knee tracked well with all trial implants. The trials were then removed. Bony surfaces were drilled, lavaged, and dried. All components were placed. Polyethylene component was placed. The knee was ranged and  irrigated copiously with dilute sterile betadine followed by saline pulsatile lavage.     All surrounding soft tissues were infiltrated with local anesthetic. The arthrotomy  was closed with running quill suture

## 2025-05-21 NOTE — H&P
Update History & Physical    The patient's History and Physical  was reviewed with the patient and I examined the patient. There was no change. The surgical site was confirmed by the patient and me.       Plan: The risks, benefits, expected outcome, and alternative to the recommended procedure have been discussed with the patient. Patient understands and wants to proceed with the procedure.     Electronically signed by Francisco Palafox MD on 5/21/2025 at 8:51 AM

## 2025-05-21 NOTE — PROGRESS NOTES
PHYSICAL THERAPY EVALUATION/DISCHARGE    Patient: Anette Cannon (55 y.o. female)  Date: 5/21/2025  Primary Diagnosis: Arthritis of right knee [M17.11]  Osteoarthritis of right knee, unspecified osteoarthritis type [M17.11]  Procedure(s) (LRB):  ROBOTIC RIGHT TOTAL KNEE ARTHROPLASTY (VELYS) (FAST TRACK) (Right) * Day of Surgery *   Precautions: Restrictions/Precautions  Restrictions/Precautions: Weight Bearing, Fall Risk  Activity Level: Up with Assist Lower Extremity Weight Bearing Restrictions  Right Lower Extremity Weight Bearing: Weight Bearing As Tolerated          ASSESSMENT AND RECOMMENDATIONS:  Based on the objective data below, the patient presents POD # 0 right TKR with pain right knee, decreased AROM/strength and function right leg, decreased activity tolerance, decline in functional mobility and impaired standing balance/gait with RW.  Pt mobilized easily with no c/o's of nausea or dizziness.  Pt able to amb with RW and perform stairs with standby guard.  Pt/caregiver able to verbalize understanding of discharge instructions.  Pt cleared for discharge from PT standpoint.    Discharge Instructions:  Reviewed and performed supine TKA exercise.   Reviewed and instructed in proper positioning to avoid external rotation and knee flexion in supine or recliner position - using blanket roll to support leg in neutral position.  Reviewed importance of not placing pillow under knee to position knee in prolonged flexion.  Instructed pt in proper use of ice and elevation to decrease pain and swelling and progressive walking program as tolerated.  Patient instructed to have caregiver provide standby guard when OOB/amb/stairs - for rest of day and during the night - due to potential decreases in BP - dizziness.   Pt able to verbalize understanding of discharge education.      Functional Outcome Measure:  The patient scored 21/24 on the AM-PAC outcome measure which is indicative of a Cutoff score <=171,2,3 had higher

## 2025-05-21 NOTE — ANESTHESIA POSTPROCEDURE EVALUATION
Department of Anesthesiology  Postprocedure Note    Patient: Anette Cannon  MRN: [id number]  YOB: 1970  Date of evaluation: 5/21/2025    Procedure Summary       Date: 05/21/25 Room / Location: [facility] MAIN OR [address] [place] [address] MAIN OR    Anesthesia Start: 0934 Anesthesia Stop: 1122    Procedure: ROBOTIC RIGHT TOTAL KNEE ARTHROPLASTY (VELYS) (FAST TRACK) (Right: Knee) Diagnosis:       Arthritis of right knee      (Arthritis of right knee [M17.11])    Providers: Francisco Palafox MD Responsible Provider: Demian Shelton DO    Anesthesia Type: Spinal ASA Status: 3            Anesthesia Type: Spinal    Tung Phase I: Tung Score: 8    Tung Phase II:      Anesthesia Post Evaluation    Patient location during evaluation: PACU  Patient participation: complete - patient participated  Level of consciousness: awake  Pain score: 0  Airway patency: patent  Nausea & Vomiting: no nausea  Cardiovascular status: hemodynamically stable  Respiratory status: acceptable  Hydration status: euvolemic  Pain management: adequate    No notable events documented.

## 2025-05-21 NOTE — ANESTHESIA PROCEDURE NOTES
Spinal Block    End time: 5/21/2025 9:46 AM  Reason for block: primary anesthetic  Staffing  Performed: resident/CRNA   Anesthesiologist: Demian Shelton DO  Resident/CRNA: Kevin Parker APRN - CRNA  Performed by: Kevin Parker APRN - CRNA  Authorized by: Demian Shelton DO    Spinal Block  Patient position: sitting  Prep: Betadine  Patient monitoring: continuous pulse ox, frequent blood pressure checks, oxygen and continuous capnometry  Approach: midline  Location: L3/L4  Provider prep: mask and sterile gloves  Needle  Needle type: Sprotte Tip   Needle gauge: 24 G  Needle length: 3.5 in  Assessment  Swirl obtained: Yes  CSF: clear  Attempts: 1  Hemodynamics: stable  Preanesthetic Checklist  Completed: patient identified, IV checked, site marked, risks and benefits discussed, surgical/procedural consents, equipment checked, pre-op evaluation, timeout performed, anesthesia consent given, oxygen available, monitors applied/VS acknowledged, fire risk safety assessment completed and verbalized and blood product R/B/A discussed and consented

## 2025-05-21 NOTE — PROGRESS NOTES
Ortho NP Note    POD# 0  s/p ROBOTIC RIGHT TOTAL KNEE ARTHROPLASTY (VELYS) (FAST TRACK)     Pt resting on stretcher in PACU comfortably. Awake and alert.   Reports BLE numbness, minimal motor return s/p spinal block   Denies knee pain, reviewed pain control regimen with prn oxycodone 5 mg  No nausea.   She would like to return home today. Reviewed fast track goals for safe discharge.     VSS Afebrile.    Visit Vitals  BP (!) 153/85   Pulse 79   Temp 98.4 °F (36.9 °C) (Oral)   Resp 16   Ht 1.651 m (5' 5\")   Wt 82.6 kg (182 lb)   SpO2 95%   BMI 30.29 kg/m²       Voiding status: due to void          Labs    Lab Results   Component Value Date/Time    HGB 13.9 05/09/2025 03:38 PM      Lab Results   Component Value Date/Time    INR 1.1 09/27/2024 08:14 AM      Lab Results   Component Value Date/Time     05/09/2025 03:38 PM    K 3.8 05/09/2025 03:38 PM    CL 99 05/09/2025 03:38 PM    CO2 31 05/09/2025 03:38 PM    BUN 27 05/09/2025 03:38 PM     Recent Glucose Results:   Glucose   Date Value Ref Range Status   05/09/2025 221 (H) 65 - 100 mg/dL Final   03/24/2025 282 (H) 65 - 100 mg/dL Final   03/23/2025 140 (H) 65 - 100 mg/dL Final   01/27/2025 73 70 - 99 mg/dL Final   11/08/2024 91 70 - 99 mg/dL Final   09/19/2024 131 (H) 70 - 99 mg/dL Final           Body mass index is 30.29 kg/m². : A BMI > 30 is classified as obesity and > 40 is classified as morbid obesity.       Ace wrap dressing c.d.i  Cryotherapy in place over incision  Calves soft and supple; No pain with passive stretch  Sensation and motor intact. +PF/DF/EHL intact   SCDs for mechanical DVT proph while in bed     PLAN:  1) PT BID, OT - WBAT  2) Aspirin 81 mg PO BID for DVT Prophylaxis. Encouraged early mobilization, bed exercises, and SCD use.  3) GI Prophylaxis - Pepcid   4) Pain control - scheduled tylenol, and prn  oxycodone  .  5) Post op care - Continue bowel regimen, encouraged IS. Straight cath per protocol. Optifoam/Mepilex to remain in place x 7 day

## 2025-05-21 NOTE — CARE COORDINATION
YESY:    Home Health pending; any agency in network is acceptable to patient - [facility] accepted and was added to AVS.     Family/friend transport    Owns walker    Fast track patient

## 2025-05-21 NOTE — DISCHARGE SUMMARY
solution Unknown  Yes No   aspirin 81 MG EC tablet 2025 Self Yes No   Sig: Take 1 tablet by mouth daily   atomoxetine (STRATTERA) 40 MG capsule   Yes No   Sig: Take 1 capsule by mouth daily   blood glucose test strips (RELION GLUCOSE TEST STRIPS) strip  Self No No   Sig: Test 10 times daily due to fluctuating blood sugars   busPIRone (BUSPAR) 15 MG tablet  Self Yes No   Sig: Take 15 mg by mouth 3 times daily   carbidopa-levodopa (SINEMET)  MG per tablet 2025 Self No Yes   Sig: TAKE 1 TABLET BY MOUTH THREE TIMES A DAY   diclofenac sodium (VOLTAREN) 1 % GEL  Self Yes No   Sig: Apply 2 g topically 4 times daily as needed   esomeprazole (NEXIUM) 40 MG delayed release capsule 2025 Self Yes Yes   Sig: Take 1 capsule by mouth in the morning and at bedtime   famotidine (PEPCID) 40 MG tablet 2025  Yes Yes   Sig: Take 1 tablet by mouth nightly   ferrous sulfate (IRON 325) 325 (65 Fe) MG tablet  Self Yes No   Sig: every other day   fluticasone (FLONASE) 50 MCG/ACT nasal spray  Self Yes No   Si spray by Each Nostril route daily as needed for Rhinitis   furosemide (LASIX) 20 MG tablet 2025  Yes Yes   Sig: Take 1 tablet by mouth daily   glucagon, rDNA, (GLUCAGEN HYPOKIT) 1 MG SOLR injection  Self No No   Sig: USE AS DIRECTED IN KIT INSTRUCTION   levothyroxine (SYNTHROID) 125 MCG tablet 2025 Self No Yes   Sig: Take 1 tablet by mouth Daily Delete 112 mcg dose from profile   lidocaine (LIDODERM) 5 %  Self Yes No   Sig: APPLY 1 PATCH DAILY EXTERNALLY AND REMOVE AFTER 12 HOURS FOR 30 DAYS.   metoclopramide (REGLAN) 5 MG tablet   Yes No   Sig: Take 1 tablet by mouth 2 times daily   mirtazapine (REMERON) 15 MG tablet  Self Yes No   Sig: TAKE 1 TABLET BY MOUTH EVERY EVENING AT BEDTIME FOR SLEEP AND DEPRESSION   mupirocin (BACTROBAN) 2 % ointment  Self Yes No   Sig: Apply topically 2 times daily Apply topically 2 times daily for tx of MSSA   oxyCODONE-acetaminophen (PERCOCET)  MG per tablet

## 2025-06-15 DIAGNOSIS — E78.2 MIXED HYPERLIPIDEMIA: ICD-10-CM

## 2025-06-15 DIAGNOSIS — E03.9 ACQUIRED HYPOTHYROIDISM: ICD-10-CM

## 2025-06-15 DIAGNOSIS — I10 ESSENTIAL (PRIMARY) HYPERTENSION: ICD-10-CM

## 2025-06-15 DIAGNOSIS — R79.89 ELEVATED LFTS: ICD-10-CM

## 2025-06-15 DIAGNOSIS — E10.65 TYPE 1 DIABETES MELLITUS WITH HYPERGLYCEMIA (HCC): ICD-10-CM

## 2025-06-26 ENCOUNTER — OFFICE VISIT (OUTPATIENT)
Age: 55
End: 2025-06-26

## 2025-06-26 VITALS
SYSTOLIC BLOOD PRESSURE: 115 MMHG | HEART RATE: 89 BPM | WEIGHT: 174.6 LBS | BODY MASS INDEX: 29.05 KG/M2 | DIASTOLIC BLOOD PRESSURE: 63 MMHG

## 2025-06-26 DIAGNOSIS — E78.2 MIXED HYPERLIPIDEMIA: ICD-10-CM

## 2025-06-26 DIAGNOSIS — E03.9 ACQUIRED HYPOTHYROIDISM: ICD-10-CM

## 2025-06-26 DIAGNOSIS — E10.65 TYPE 1 DIABETES MELLITUS WITH HYPERGLYCEMIA (HCC): Primary | ICD-10-CM

## 2025-06-26 DIAGNOSIS — R79.89 ELEVATED LFTS: ICD-10-CM

## 2025-06-26 DIAGNOSIS — I10 ESSENTIAL (PRIMARY) HYPERTENSION: ICD-10-CM

## 2025-06-26 NOTE — PATIENT INSTRUCTIONS
1) Your pump settings look great so I won't make any changes.  Your estimated A1c on your dexcom is 7.1% but have Dr. Vuong draw the labs on my order and fax me the results.   I will send you a message through [employer] with your lab results.    2) Your weight is down 23 lbs since 3/25.    3) Your blood pressure looks great.

## 2025-06-26 NOTE — PROGRESS NOTES
Chief Complaint   Patient presents with    Diabetes     Pt consents to [facility]    Thyroid Problem     PCP and Pharmacy verified     History of Present Illness: Anette Cannon is a 55 y.o. female here for follow up of diabetes.  Weight down 23 lbs since last visit in 3/25.  Review of her dexcom data over the past 90 days shows 73% in range, 23% high, 3% very high, 1% low and <1% very low.    Current settings are as follows:  - basal: 12a: 1.2  - Carb ratio: 12a: 10  - sensitivity: 12a: 50  - target: 120-150  - active insulin time: 5 hours    she has the following indications to conting treatment with Dexcom:  1) she has type 1 diabetes (E10.65) and is on an intensive insulin regimen with an insulin pump  2) she tests her blood sugar 10 times per day and makes treatment decisions off her blood sugar readings and off sensor readings  3) she requires frequent adjustments to her insulin pump settings based on her sensor readings  4) she has benefitted from therapeutic continuous glucose monitoring and I recommend that she continue this  5) she is seen in my office every 1-3 months          History of Present Illness  The patient is a 55-year-old female here for follow-up of diabetes.    She has been managing her diabetes with the OmniPod and Dexcom systems, which have significantly improved her condition. Her A1c level was recorded as 7.1 during her hospital stay. She reports that her blood sugar levels are well-controlled, with readings of 136 in the morning and 120 at other times. She no longer experiences panic attacks when her blood sugar drops to 100. She attributes these improvements to increased physical activity, including walking and physical therapy, despite experiencing some pain. Issues with insulin delivery sites have been resolved by changing the site.  The spikes she has in the mid-morning are due to pain while doing PT and not from food.    She underwent a right total knee replacement on 05/21/2025 and has

## 2025-06-27 LAB — HBA1C MFR BLD HPLC: 7.4 %

## 2025-06-29 RX ORDER — LEVOTHYROXINE SODIUM 125 UG/1
125 TABLET ORAL DAILY
Qty: 90 TABLET | Refills: 3 | Status: SHIPPED | OUTPATIENT
Start: 2025-06-29

## 2025-07-01 ENCOUNTER — TELEPHONE (OUTPATIENT)
Age: 55
End: 2025-07-01

## 2025-07-01 NOTE — TELEPHONE ENCOUNTER
Spoke to the pt. She stated she will be receivng new pods for her omnipod due to one if them being malfunctioned. Pt stated the pod didn't administer insulin on 6/29/2025 which caused her blood sugar levels to increase. Pt was able to place another pod on and now her blood sugar levels are back down.     Pt also expressed that she received one box of Humalog from her pharmacy and was wondering why when she normally receives multiple at a time. I called [facility] and spoke to Norma who informed me the pt received 4 boxes in April however, she recently transferred her Humalog Rx to the [facility] on [facility] ([phone]) in May 2025.     I called [facility] and spoke to Liliana (Pharmacist) who informed me they dispensed 2 boxes to the pt.     I called the pt back and informed her of what was told to me. The pt then confirmed that she did receive the 2 boxes from [facility] in May. She also stated she would like her Humalog rx to be sent back to [facility], but she will call us for it to be sent to [facility] once she is running low of her insulin.    Pt also stated she has 8 boxes of unexpired Lantus pens and would like to know if she should bring them to our office to give to other pts.

## 2025-07-01 NOTE — TELEPHONE ENCOUNTER
I'm happy to take her lantus pens to give to other patients if she wants to drop them off when she has a chance.  Thanks!

## 2025-07-09 ENCOUNTER — TELEPHONE (OUTPATIENT)
Age: 55
End: 2025-07-09

## 2025-07-09 NOTE — TELEPHONE ENCOUNTER
Patient left a voicemail on the nurses line that she wants labs done for her potassium and sodium. She has labs ordered as of 6/26/25. Tried to return her phone call ([phone]) but her voicemail was full and I was unable to leave a message.

## 2025-07-11 NOTE — TELEPHONE ENCOUNTER
Spoke with patient. She is requesting her labs to be sent over from Dr. Vuong's office. I told her I couldn't see these labs that she should probably call his office to get those results. She stated that we normally get these results for her. She also stated that she needs Dr. Schmitt to drop her basals as its doing the 24 hour effect on her. She said this is from all of the exercise she is doing every day. She said she took 3 glucagon shots yesterday.

## 2025-07-11 NOTE — TELEPHONE ENCOUNTER
Please let her know I had received her labs from Dr. Vuong recently and written her a [employer] message on 6/30/25 about them.  Are there additional labs she is talking about?  She is welcome to change her basal rate from 1.2 to 1.0 to see if this helps with all the lows she is having.

## 2025-07-11 NOTE — TELEPHONE ENCOUNTER
Spoke with patient and read her the [employer] message Dr. Schmitt sent her in regards to her labs- Message sent 6/30/25. Informed her Dr. Schmitt also said she could decrease her basal rate from 1.2 to 1.0 to see if it helps with her lows. She voiced understanding but wasn't sure how to change it but mention she would call Omnipod for help. No additional questions or concerns.

## 2025-07-28 ENCOUNTER — TELEMEDICINE (OUTPATIENT)
Age: 55
End: 2025-07-28
Payer: MEDICARE

## 2025-07-28 DIAGNOSIS — G21.19 DRUG-INDUCED PARKINSON'S DISEASE: Primary | ICD-10-CM

## 2025-07-28 PROBLEM — U07.1 COVID-19: Status: RESOLVED | Noted: 2023-06-26 | Resolved: 2025-07-28

## 2025-07-28 PROBLEM — J44.1 COPD EXACERBATION (HCC): Status: RESOLVED | Noted: 2019-07-10 | Resolved: 2025-07-28

## 2025-07-28 PROBLEM — R41.82 CHANGE IN MENTAL STATUS: Status: RESOLVED | Noted: 2019-08-21 | Resolved: 2025-07-28

## 2025-07-28 PROBLEM — E11.10 DKA (DIABETIC KETOACIDOSIS) (HCC): Status: RESOLVED | Noted: 2023-03-06 | Resolved: 2025-07-28

## 2025-07-28 PROBLEM — J96.00 RESPIRATORY FAILURE, ACUTE (HCC): Status: RESOLVED | Noted: 2021-01-18 | Resolved: 2025-07-28

## 2025-07-28 PROBLEM — J69.0 ASPIRATION PNEUMONIA (HCC): Status: RESOLVED | Noted: 2017-11-26 | Resolved: 2025-07-28

## 2025-07-28 PROBLEM — I50.9 ACUTE CONGESTIVE HEART FAILURE, UNSPECIFIED HEART FAILURE TYPE (HCC): Status: RESOLVED | Noted: 2024-03-13 | Resolved: 2025-07-28

## 2025-07-28 PROBLEM — N17.9 AKI (ACUTE KIDNEY INJURY): Status: RESOLVED | Noted: 2019-08-22 | Resolved: 2025-07-28

## 2025-07-28 PROBLEM — J96.01 ACUTE RESPIRATORY FAILURE WITH HYPOXIA (HCC): Status: RESOLVED | Noted: 2025-03-22 | Resolved: 2025-07-28

## 2025-07-28 PROBLEM — A41.9 SEPSIS (HCC): Status: RESOLVED | Noted: 2021-01-18 | Resolved: 2025-07-28

## 2025-07-28 PROBLEM — I50.9 ACUTE DECOMPENSATED HEART FAILURE (HCC): Status: RESOLVED | Noted: 2024-06-26 | Resolved: 2025-07-28

## 2025-07-28 PROCEDURE — 99214 OFFICE O/P EST MOD 30 MIN: CPT | Performed by: PSYCHIATRY & NEUROLOGY

## 2025-07-28 PROCEDURE — G2211 COMPLEX E/M VISIT ADD ON: HCPCS | Performed by: PSYCHIATRY & NEUROLOGY

## 2025-07-28 RX ORDER — ERYTHROMYCIN 250 MG/1
250 TABLET, DELAYED RELEASE ORAL 3 TIMES DAILY
COMMUNITY

## 2025-07-28 RX ORDER — CARBIDOPA AND LEVODOPA 25; 100 MG/1; MG/1
1 TABLET ORAL 3 TIMES DAILY
Qty: 270 TABLET | Refills: 3 | Status: SHIPPED | OUTPATIENT
Start: 2025-07-28

## 2025-07-28 NOTE — PATIENT INSTRUCTIONS
As a reminder:   Please come to your appointment 15 minutes before your office appointment.  This way, you can get checked in at the  and checked in by the nursing staff so you have the full allotment of time with your provider for your visit.  Please bring an up-to-date and accurate list of all your medications.  Or bring all your active prescription bottles with you at the time of your office visit and this includes over-the-counter medications so we can make sure that your medication list is up-to-date.  If you are scheduled for a virtual visit, please be aware that the  will need to check you in and usually the day before to verify insurance and collect co-pays as appropriate.  Please be prepared for the second call which will be from the nurse to go over your medications and any other vital information.  This will probably be done 30 minutes prior to your visit.  The reason why we do this early is that you can get the full benefit of your appointment time with your provider.  Finally you will be given the link for your virtual visit please click into your link 10 minutes prior to your appointment and please wait patiently for the provider to join you            Office Policies    Phone calls/patient messages:  Please allow up to 72 hours  for someone in the office to contact you about your call or message. Be mindful your provider may be out of the office or your message may require further review. We encourage you to use [employer] for your messages as this is a faster, more efficient way to communicate with our office    Medication Refills:  Prescription medications require up to 48 business hours to process. We encourage you to use [employer] for your refills.     For controlled medications: Please allow up to 72 business hours to process. Certain medications may require you to  a written prescription at our office.    NO narcotic/controlled medications will be prescribed after 4pm

## 2025-07-28 NOTE — PROGRESS NOTES
[facility]  [facility]  [address]  [place] [address]  Phone: [phone] fax: [phone]      Anette Cannon, was evaluated through a synchronous (real-time) audio-video encounter. The patient (or guardian if applicable) is aware that this is a billable service, which includes applicable co-pays. This Virtual Visit was conducted with patient's (and/or legal guardian's) consent. Patient identification was verified, and a caregiver was present when appropriate.   The patient was located at Home: [address] [facility] [address]  Provider was located at Home (Appt Dept State): [place]  Confirm you are appropriately licensed, registered, or certified to deliver care in the state where the patient is located as indicated above. If you are not or unsure, please re-schedule the visit: Yes, I confirm.     Anette Cannon (:  1970) is a Established patient, presenting virtually for evaluation of the following:   Chief Complaint   Patient presents with    Tremors         Assessment & Plan     Below is the assessment and plan developed based on review of pertinent history, physical exam, labs, studies, and medications.  1. Drug-induced Parkinson's disease  Comments:  Left upper extremity, head neck mouth and right upper extremity  Assessment & Plan:  Patient is aware of the results of her Syn-one Skin Bx biopsy as negative for alpha-synuclein protein.  So less likely we are dealing with an idiopathic Parkinson's disease.    She is finding benefit however with the use of the Sinemet with left arm tremor.  She takes the Sinemet 25/100 mg 1 tablet 3 times a day.  Her right arm tremor is about the same no other complaints.    She has been out of her Sinemet now for several days and has noticed a big difference in her tremor.    We renewed her Sinemet with a 90-day supply and refills for a year she has been encouraged to keep all of her other

## 2025-08-05 RX ORDER — LEVOTHYROXINE SODIUM 125 UG/1
125 TABLET ORAL DAILY
Qty: 90 TABLET | Refills: 3 | Status: SHIPPED | OUTPATIENT
Start: 2025-08-05

## 2025-08-10 DIAGNOSIS — E03.9 ACQUIRED HYPOTHYROIDISM: ICD-10-CM

## 2025-08-11 RX ORDER — IBUPROFEN 600 MG/1
TABLET ORAL
Qty: 4 EACH | Refills: 11 | Status: SHIPPED | OUTPATIENT
Start: 2025-08-11

## 2025-08-18 ENCOUNTER — TELEPHONE (OUTPATIENT)
Age: 55
End: 2025-08-18

## 2025-08-20 ENCOUNTER — TELEPHONE (OUTPATIENT)
Age: 55
End: 2025-08-20

## (undated) DEVICE — HANDPIECE SET WITH BONE CLEANING TIP AND SUCTION TUBE: Brand: INTERPULSE

## (undated) DEVICE — DRAPE EQUIP SATELLITE STN STRL VELYS

## (undated) DEVICE — STERILE-Z SURGICAL PATIENT COVERS CLEAR POLYETHYLENE STERILE UNIVERSAL FIT 10 PER CASE: Brand: STERILE-Z

## (undated) DEVICE — SPONGE GZ W4XL4IN COT 12 PLY TYP VII WVN C FLD DSGN STERILE

## (undated) DEVICE — TROCAR: Brand: KII FIOS FIRST ENTRY

## (undated) DEVICE — HEART CATH-MRMC: Brand: MEDLINE INDUSTRIES, INC.

## (undated) DEVICE — SWAN-GANZ TRUE SIZE THERMODULTION CATHETER, 5F: Brand: SWAN-GANZ TRUE SIZE

## (undated) DEVICE — OCCLUSIVE GAUZE STRIP,3% BISMUTH TRIBROMOPHENATE IN PETROLATUM BLEND: Brand: XEROFORM

## (undated) DEVICE — GLIDESHEATH SLENDER ACCESS KIT: Brand: GLIDESHEATH SLENDER

## (undated) DEVICE — BIPOLAR FORCEPS CORD: Brand: VALLEYLAB

## (undated) DEVICE — 2108 SERIES SAGITTAL BLADE, NO OFFSET  (12.4 X 1.19 X 82.1MM)

## (undated) DEVICE — SUTURE MONOCRYL + SZ 3-0 L27IN ABSRB UD PS1 L24MM 3/8 CIR REV MCP936H

## (undated) DEVICE — GLOVE SURG SZ 8 L12IN FNGR THK94MIL STD WHT LTX FREE

## (undated) DEVICE — 4-PORT MANIFOLD: Brand: NEPTUNE 2

## (undated) DEVICE — ELECTRO LUBE IS A SINGLE PATIENT USE DEVICE THAT IS INTENDED TO BE USED ON ELECTROSURGICAL ELECTRODES TO REDUCE STICKING.: Brand: KEY SURGICAL ELECTRO LUBE

## (undated) DEVICE — PROVE COVER: Brand: UNBRANDED

## (undated) DEVICE — AIRSEAL 5 MM ACCESS PORT AND LOW PROFILE OBTURATOR WITH BLADELESS OPTICAL TIP, 100 MM LENGTH: Brand: AIRSEAL

## (undated) DEVICE — Device

## (undated) DEVICE — SUTURE ETHLN SZ 4-0 L18IN NONABSORBABLE BLK L19MM PS-2 3/8 1667H

## (undated) DEVICE — TUBING PRSS MON L6IN PVC M FEM CONN

## (undated) DEVICE — COVER LT HNDL PLAS RIG 1 PER PK

## (undated) DEVICE — 3M™ TEGADERM™ TRANSPARENT FILM DRESSING FRAME STYLE, 1626W, 4 IN X 4-3/4 IN (10 CM X 12 CM), 50/CT 4CT/CASE: Brand: 3M™ TEGADERM™

## (undated) DEVICE — KIT ACCS INTRO 4FR L10CM NDL 21GA L7CM GWIRE L40CM

## (undated) DEVICE — ZIMMER® STERILE DISPOSABLE TOURNIQUET CUFF WITH PLC, DUAL PORT, SINGLE BLADDER, 18 IN. (46 CM)

## (undated) DEVICE — BLADE SAW OSCILLATING 85X19X2 MM ROBOTIC-ASSISTED VELYS

## (undated) DEVICE — DRAPE EQUIP ROBOT STRL VELYS 20/PK ORDER IN MULTIIPLES OF 20 EACH

## (undated) DEVICE — SOLUTION IRRIG 1000ML STRL H2O USP PLAS POUR BTL

## (undated) DEVICE — YANKAUER,FLEXIBLE HANDLE,REGLR CAPACITY: Brand: MEDLINE INDUSTRIES, INC.

## (undated) DEVICE — DRAPE SURG KNEE 1 MIN SET ESYSUIT

## (undated) DEVICE — GLOVE ORANGE PI 8   MSG9080

## (undated) DEVICE — Device: Brand: XPERIENCE

## (undated) DEVICE — STERILE POLYISOPRENE POWDER-FREE SURGICAL GLOVES: Brand: PROTEXIS

## (undated) DEVICE — HYPODERMIC SAFETY NEEDLE: Brand: MAGELLAN

## (undated) DEVICE — STRETCH BANDAGE ROLL: Brand: DERMACEA

## (undated) DEVICE — SOLUTION SURG PREP 26 CC PURPREP

## (undated) DEVICE — VCARE MEDIUM, UTERINE MANIPULATOR, VAGINAL-CERVICAL-AHLUWALIA'S-RETRACTOR-ELEVATOR: Brand: VCARE

## (undated) DEVICE — PAD BD MATTRESS 73X32 IN STD CONVOLUTED FOAM LTX FREE

## (undated) DEVICE — GLOVE SURG SZ 65 L12IN FNGR THK94MIL STD WHT LTX FREE

## (undated) DEVICE — TRI-LUMEN FILTERED TUBE SET WITH ACTIVATED CHARCOAL FILTER: Brand: AIRSEAL

## (undated) DEVICE — HOOD, PEEL-AWAY: Brand: FLYTE

## (undated) DEVICE — ADAPTER ES HND SWCH DISP HARM

## (undated) DEVICE — HOOK ENDOSCP L4-9CM TIP FOR SEAL AND DISECT HARM

## (undated) DEVICE — SPLINT WR VELC FOAM NEUT POS DISP FOR RAD ART ACC SFT STRP

## (undated) DEVICE — GLOVE SURG SZ 65 L12IN FNGR THK79MIL GRN LTX FREE

## (undated) DEVICE — TOTAL JOINT - SMH: Brand: MEDLINE INDUSTRIES, INC.

## (undated) DEVICE — PIN DRL 4X125 MM ROBOTIC-ASSISTED SOLUTION ARRY VELYS

## (undated) DEVICE — MARKER,SKIN,WI/RULER AND LABELS: Brand: MEDLINE

## (undated) DEVICE — SUTURE ABS MF 2-0 CT1 27IN STRATAFIX PDS+ SXPP1B412

## (undated) DEVICE — ATTUNE SOLO PINNING SYSTEM

## (undated) DEVICE — KIT,ANTI FOG,W/SPONGE & FLUID,SOFT PACK: Brand: MEDLINE

## (undated) DEVICE — SUTURE VICRYL 1 L27IN ABSRB CT BRAID COAT UD J281H

## (undated) DEVICE — SUTURE ABSRB L30CM 2-0 VLT SPRL PDS + STRATAFIX SXPP1B410

## (undated) DEVICE — INFECTION CONTROL KIT SYS

## (undated) DEVICE — (D)PREP SKN CHLRAPRP APPL 26ML -- CONVERT TO ITEM 371833

## (undated) DEVICE — LIGHT HANDLE: Brand: DEVON

## (undated) DEVICE — INSUFFLATION NEEDLE TO ESTABLISH PNEUMOPERITONEUM.: Brand: INSUFFLATION NEEDLE

## (undated) DEVICE — GLOVE ORTHO 8   MSG9480

## (undated) DEVICE — SUTURE STRATAFIX SYMMETRIC PDS + SZ 1 L18IN ABSRB VLT L48MM SXPP1A400

## (undated) DEVICE — HYPODERMIC SAFETY NEEDLE: Brand: MONOJECT

## (undated) DEVICE — BOWL BNE CEM MIX SPAT CURET SMARTMIX CTS

## (undated) DEVICE — TUBING SUCT 12FR MAL ALUM SHFT FN CAP VENT UNIV CONN W/ OBT

## (undated) DEVICE — SOLUTION IV 1000ML 0.9% SOD CHL PH 5 INJ USP VIAFLX PLAS

## (undated) DEVICE — LIQUIBAND RAPID ADHESIVE 36/CS 0.8ML: Brand: MEDLINE

## (undated) DEVICE — SOL IRR SOD CHL 0.9% TITAN XL CNTNR 3000ML

## (undated) DEVICE — CATHETER PRESSURE WEDGE BLLN 6FRX110CM

## (undated) DEVICE — SYRINGE 20ML LL S/C 50

## (undated) DEVICE — SUTURE VICRYL SZ 2-0 L27IN ABSRB UD L26MM SH 1/2 CIR J417H

## (undated) DEVICE — SCRUBIN SCRUB BRUSH DRY STER: Brand: MEDLINE INDUSTRIES, INC.

## (undated) DEVICE — GOWN,SIRUS,NONRNF,SETINSLV,2XL,18/CS: Brand: MEDLINE

## (undated) DEVICE — SUTURE MONOCRYL SZ 4-0 L27IN ABSRB UD L19MM PS-2 1/2 CIR PRIM Y426H

## (undated) DEVICE — CONTAINER,SPECIMEN,4OZ,OR STRL: Brand: MEDLINE

## (undated) DEVICE — SOLUTION ANTIFOG VIS SYS CLEARIFY LAPSCP

## (undated) DEVICE — ELECTRODE PT RET AD L9FT HI MOIST COND ADH HYDRGEL CORDED

## (undated) DEVICE — HAND I-LF: Brand: MEDLINE INDUSTRIES, INC.

## (undated) DEVICE — APPLICATOR MEDICATED 26 CC SOLUTION HI LT ORNG CHLORAPREP

## (undated) DEVICE — SOLUTION WND IRRIGATION 450 ML 0.5 PVP-I 0.9 NACL

## (undated) DEVICE — BANDAGE COMPR M W6INXL10YD WHT BGE VELC E MTRX HK AND LOOP

## (undated) DEVICE — SEALER TISS L35CM DIA5MM CRV JAW ARTC ADV BPLR ENSEAL G2

## (undated) DEVICE — GYN LAPAROSCOPY-MRMC: Brand: MEDLINE INDUSTRIES, INC.

## (undated) DEVICE — SOLUTION IV 1000ML 0.9% SOD CHL

## (undated) DEVICE — TROCAR: Brand: KII SLEEVE